# Patient Record
Sex: MALE | Race: WHITE | Employment: OTHER | URBAN - METROPOLITAN AREA
[De-identification: names, ages, dates, MRNs, and addresses within clinical notes are randomized per-mention and may not be internally consistent; named-entity substitution may affect disease eponyms.]

---

## 2017-01-04 ENCOUNTER — HOSPITAL ENCOUNTER (OUTPATIENT)
Dept: WOUND CARE | Age: 52
Discharge: HOME | End: 2017-01-04
Attending: INTERNAL MEDICINE | Admitting: INTERNAL MEDICINE

## 2017-01-04 VITALS
HEIGHT: 74 IN | BODY MASS INDEX: 33.5 KG/M2 | SYSTOLIC BLOOD PRESSURE: 130 MMHG | TEMPERATURE: 97.9 F | HEART RATE: 74 BPM | DIASTOLIC BLOOD PRESSURE: 74 MMHG | WEIGHT: 261 LBS | RESPIRATION RATE: 17 BRPM

## 2017-01-04 PROCEDURE — 97605 NEG PRS WND THER DME<=50SQCM: CPT | Performed by: INTERNAL MEDICINE

## 2017-01-04 PROCEDURE — 11042 DBRDMT SUBQ TIS 1ST 20SQCM/<: CPT | Performed by: INTERNAL MEDICINE

## 2017-01-04 PROCEDURE — 17250 CHEM CAUT OF GRANLTJ TISSUE: CPT | Performed by: INTERNAL MEDICINE

## 2017-01-04 ASSESSMENT — PAIN DESCRIPTION - PAIN TYPE: TYPE: CHRONIC PAIN

## 2017-01-04 ASSESSMENT — PAIN DESCRIPTION - LOCATION: LOCATION: BACK;SHOULDER;NECK

## 2017-01-04 ASSESSMENT — PAIN DESCRIPTION - ORIENTATION: ORIENTATION: RIGHT

## 2017-01-04 ASSESSMENT — PAIN DESCRIPTION - ONSET: ONSET: ON-GOING

## 2017-01-04 ASSESSMENT — PAIN DESCRIPTION - FREQUENCY: FREQUENCY: CONTINUOUS

## 2017-01-04 ASSESSMENT — PAIN DESCRIPTION - DESCRIPTORS: DESCRIPTORS: BURNING

## 2017-01-04 ASSESSMENT — PAIN DESCRIPTION - PROGRESSION: CLINICAL_PROGRESSION: NOT CHANGED

## 2017-01-04 ASSESSMENT — PAIN SCALES - GENERAL: PAINLEVEL_OUTOF10: 3

## 2017-01-11 ENCOUNTER — HOSPITAL ENCOUNTER (OUTPATIENT)
Dept: WOUND CARE | Age: 52
Discharge: HOME | End: 2017-01-11
Attending: INTERNAL MEDICINE | Admitting: INTERNAL MEDICINE

## 2017-01-11 VITALS
HEART RATE: 83 BPM | WEIGHT: 255.13 LBS | SYSTOLIC BLOOD PRESSURE: 110 MMHG | HEIGHT: 74 IN | RESPIRATION RATE: 18 BRPM | BODY MASS INDEX: 32.74 KG/M2 | TEMPERATURE: 97 F | DIASTOLIC BLOOD PRESSURE: 74 MMHG

## 2017-01-11 PROCEDURE — 11044 DBRDMT BONE 1ST 20 SQ CM/<: CPT | Performed by: INTERNAL MEDICINE

## 2017-01-11 PROCEDURE — 17250 CHEM CAUT OF GRANLTJ TISSUE: CPT | Performed by: INTERNAL MEDICINE

## 2017-01-11 ASSESSMENT — PAIN DESCRIPTION - LOCATION: LOCATION: SHOULDER

## 2017-01-11 ASSESSMENT — PAIN DESCRIPTION - DESCRIPTORS: DESCRIPTORS: BURNING;STABBING

## 2017-01-11 ASSESSMENT — PAIN DESCRIPTION - PROGRESSION: CLINICAL_PROGRESSION: NOT CHANGED

## 2017-01-11 ASSESSMENT — PAIN DESCRIPTION - FREQUENCY: FREQUENCY: CONTINUOUS

## 2017-01-11 ASSESSMENT — PAIN SCALES - GENERAL: PAINLEVEL_OUTOF10: 3

## 2017-01-11 ASSESSMENT — PAIN DESCRIPTION - ONSET: ONSET: ON-GOING

## 2017-01-11 ASSESSMENT — PAIN DESCRIPTION - ORIENTATION: ORIENTATION: LEFT

## 2017-01-11 ASSESSMENT — PAIN DESCRIPTION - PAIN TYPE: TYPE: CHRONIC PAIN

## 2017-01-12 DIAGNOSIS — E11.622 DIABETES MELLITUS WITH SKIN ULCER (HCC): ICD-10-CM

## 2017-01-12 DIAGNOSIS — L98.499 DIABETES MELLITUS WITH SKIN ULCER (HCC): ICD-10-CM

## 2017-01-12 RX ORDER — INSULIN DETEMIR 100 [IU]/ML
INJECTION, SOLUTION SUBCUTANEOUS
Qty: 1 VIAL | Refills: 0 | Status: SHIPPED | OUTPATIENT
Start: 2017-01-12 | End: 2017-01-19 | Stop reason: SDUPTHER

## 2017-01-13 DIAGNOSIS — E11.622 DIABETES MELLITUS WITH SKIN ULCER (HCC): ICD-10-CM

## 2017-01-13 DIAGNOSIS — L98.499 DIABETES MELLITUS WITH SKIN ULCER (HCC): ICD-10-CM

## 2017-01-18 ENCOUNTER — HOSPITAL ENCOUNTER (OUTPATIENT)
Dept: WOUND CARE | Age: 52
Discharge: HOME | End: 2017-01-18
Attending: INTERNAL MEDICINE | Admitting: INTERNAL MEDICINE

## 2017-01-18 VITALS
RESPIRATION RATE: 20 BRPM | TEMPERATURE: 97.2 F | WEIGHT: 255.4 LBS | DIASTOLIC BLOOD PRESSURE: 64 MMHG | SYSTOLIC BLOOD PRESSURE: 107 MMHG | HEART RATE: 65 BPM | BODY MASS INDEX: 32.79 KG/M2

## 2017-01-18 DIAGNOSIS — E11.621: Primary | ICD-10-CM

## 2017-01-18 DIAGNOSIS — L97.524: Primary | ICD-10-CM

## 2017-01-18 PROCEDURE — 11042 DBRDMT SUBQ TIS 1ST 20SQCM/<: CPT | Performed by: INTERNAL MEDICINE

## 2017-01-18 PROCEDURE — 11044 DBRDMT BONE 1ST 20 SQ CM/<: CPT | Performed by: INTERNAL MEDICINE

## 2017-01-18 PROCEDURE — 17250 CHEM CAUT OF GRANLTJ TISSUE: CPT | Performed by: INTERNAL MEDICINE

## 2017-01-19 ENCOUNTER — OFFICE VISIT (OUTPATIENT)
Dept: INTERNAL MEDICINE CLINIC | Age: 52
End: 2017-01-19

## 2017-01-19 VITALS
BODY MASS INDEX: 32.79 KG/M2 | DIASTOLIC BLOOD PRESSURE: 64 MMHG | HEIGHT: 74 IN | OXYGEN SATURATION: 99 % | SYSTOLIC BLOOD PRESSURE: 100 MMHG | HEART RATE: 67 BPM

## 2017-01-19 DIAGNOSIS — L97.509 TYPE 2 DIABETES MELLITUS WITH FOOT ULCER, WITH LONG-TERM CURRENT USE OF INSULIN (HCC): Primary | ICD-10-CM

## 2017-01-19 DIAGNOSIS — E11.621 TYPE 2 DIABETES MELLITUS WITH FOOT ULCER, WITH LONG-TERM CURRENT USE OF INSULIN (HCC): Primary | ICD-10-CM

## 2017-01-19 DIAGNOSIS — K21.9 GASTROESOPHAGEAL REFLUX DISEASE WITHOUT ESOPHAGITIS: ICD-10-CM

## 2017-01-19 DIAGNOSIS — M86.9 DIABETIC FOOT ULCER WITH OSTEOMYELITIS (HCC): ICD-10-CM

## 2017-01-19 DIAGNOSIS — G82.20 PARAPLEGIA (HCC): ICD-10-CM

## 2017-01-19 DIAGNOSIS — R11.0 NAUSEA: ICD-10-CM

## 2017-01-19 DIAGNOSIS — D63.8 ANEMIA OF CHRONIC DISORDER: ICD-10-CM

## 2017-01-19 DIAGNOSIS — Z79.4 TYPE 2 DIABETES MELLITUS WITH FOOT ULCER, WITH LONG-TERM CURRENT USE OF INSULIN (HCC): Primary | ICD-10-CM

## 2017-01-19 DIAGNOSIS — E11.621 DIABETIC FOOT ULCER WITH OSTEOMYELITIS (HCC): ICD-10-CM

## 2017-01-19 DIAGNOSIS — G89.29 CHRONIC BACK PAIN, UNSPECIFIED BACK LOCATION, UNSPECIFIED BACK PAIN LATERALITY: ICD-10-CM

## 2017-01-19 DIAGNOSIS — E78.5 HYPERLIPIDEMIA, UNSPECIFIED HYPERLIPIDEMIA TYPE: ICD-10-CM

## 2017-01-19 DIAGNOSIS — L97.509 DIABETIC FOOT ULCER WITH OSTEOMYELITIS (HCC): ICD-10-CM

## 2017-01-19 DIAGNOSIS — G47.09 OTHER INSOMNIA: ICD-10-CM

## 2017-01-19 DIAGNOSIS — R60.0 PEDAL EDEMA: ICD-10-CM

## 2017-01-19 DIAGNOSIS — M62.838 MUSCLE SPASM: ICD-10-CM

## 2017-01-19 DIAGNOSIS — K59.01 CONSTIPATION BY DELAYED COLONIC TRANSIT: ICD-10-CM

## 2017-01-19 DIAGNOSIS — E11.69 DIABETIC FOOT ULCER WITH OSTEOMYELITIS (HCC): ICD-10-CM

## 2017-01-19 DIAGNOSIS — G47.00 INSOMNIA, UNSPECIFIED TYPE: ICD-10-CM

## 2017-01-19 DIAGNOSIS — M54.9 CHRONIC BACK PAIN, UNSPECIFIED BACK LOCATION, UNSPECIFIED BACK PAIN LATERALITY: ICD-10-CM

## 2017-01-19 DIAGNOSIS — L98.491 CHRONIC SKIN ULCER, LIMITED TO BREAKDOWN OF SKIN (HCC): ICD-10-CM

## 2017-01-19 LAB
BASOPHILS ABSOLUTE: 0.1 K/UL (ref 0–0.2)
BASOPHILS RELATIVE PERCENT: 0.9 %
EOSINOPHILS ABSOLUTE: 0.2 K/UL (ref 0–0.6)
EOSINOPHILS RELATIVE PERCENT: 2.4 %
HCT VFR BLD CALC: 40.5 % (ref 40.5–52.5)
HEMOGLOBIN: 13.2 G/DL (ref 13.5–17.5)
LYMPHOCYTES ABSOLUTE: 1.8 K/UL (ref 1–5.1)
LYMPHOCYTES RELATIVE PERCENT: 21.4 %
MCH RBC QN AUTO: 26.2 PG (ref 26–34)
MCHC RBC AUTO-ENTMCNC: 32.6 G/DL (ref 31–36)
MCV RBC AUTO: 80.3 FL (ref 80–100)
MONOCYTES ABSOLUTE: 0.5 K/UL (ref 0–1.3)
MONOCYTES RELATIVE PERCENT: 6 %
NEUTROPHILS ABSOLUTE: 5.8 K/UL (ref 1.7–7.7)
NEUTROPHILS RELATIVE PERCENT: 69.3 %
PDW BLD-RTO: 17.5 % (ref 12.4–15.4)
PLATELET # BLD: 254 K/UL (ref 135–450)
PMV BLD AUTO: 9.8 FL (ref 5–10.5)
RBC # BLD: 5.04 M/UL (ref 4.2–5.9)
WBC # BLD: 8.3 K/UL (ref 4–11)

## 2017-01-19 PROCEDURE — 36415 COLL VENOUS BLD VENIPUNCTURE: CPT | Performed by: INTERNAL MEDICINE

## 2017-01-19 PROCEDURE — 83036 HEMOGLOBIN GLYCOSYLATED A1C: CPT | Performed by: INTERNAL MEDICINE

## 2017-01-19 PROCEDURE — 99214 OFFICE O/P EST MOD 30 MIN: CPT | Performed by: INTERNAL MEDICINE

## 2017-01-19 RX ORDER — FUROSEMIDE 20 MG/1
20 TABLET ORAL DAILY
Qty: 90 TABLET | Refills: 1 | Status: ON HOLD | OUTPATIENT
Start: 2017-01-19 | End: 2017-10-05 | Stop reason: HOSPADM

## 2017-01-19 RX ORDER — CYCLOBENZAPRINE HCL 10 MG
10 TABLET ORAL 2 TIMES DAILY PRN
Qty: 180 TABLET | Refills: 1 | Status: SHIPPED | OUTPATIENT
Start: 2017-01-19 | End: 2022-01-01 | Stop reason: ALTCHOICE

## 2017-01-19 RX ORDER — FERROUS SULFATE 325(65) MG
325 TABLET ORAL
Qty: 270 TABLET | Refills: 1 | Status: SHIPPED | OUTPATIENT
Start: 2017-01-19 | End: 2017-09-05 | Stop reason: SDUPTHER

## 2017-01-19 RX ORDER — DOCUSATE SODIUM 100 MG/1
CAPSULE, LIQUID FILLED ORAL
Qty: 180 CAPSULE | Refills: 1 | Status: SHIPPED | OUTPATIENT
Start: 2017-01-19 | End: 2017-12-31 | Stop reason: SDUPTHER

## 2017-01-19 RX ORDER — TRAZODONE HYDROCHLORIDE 50 MG/1
25 TABLET ORAL NIGHTLY
Qty: 90 TABLET | Refills: 1 | Status: SHIPPED | OUTPATIENT
Start: 2017-01-19 | End: 2018-02-08 | Stop reason: SDUPTHER

## 2017-01-19 RX ORDER — PANTOPRAZOLE SODIUM 40 MG/1
TABLET, DELAYED RELEASE ORAL
Qty: 90 TABLET | Refills: 1 | Status: SHIPPED | OUTPATIENT
Start: 2017-01-19 | End: 2017-11-21 | Stop reason: SDUPTHER

## 2017-01-19 RX ORDER — OXYCODONE HYDROCHLORIDE 5 MG/1
TABLET ORAL
Qty: 15 TABLET | Refills: 0 | Status: SHIPPED | OUTPATIENT
Start: 2017-01-19 | End: 2020-07-21

## 2017-01-19 RX ORDER — PROMETHAZINE HYDROCHLORIDE 25 MG/1
25 TABLET ORAL EVERY 6 HOURS PRN
Qty: 15 TABLET | Refills: 3 | Status: ON HOLD | OUTPATIENT
Start: 2017-01-19 | End: 2017-10-05 | Stop reason: HOSPADM

## 2017-01-20 LAB
A/G RATIO: 1 (ref 1.1–2.2)
ALBUMIN SERPL-MCNC: 3.8 G/DL (ref 3.4–5)
ALP BLD-CCNC: 129 U/L (ref 40–129)
ALT SERPL-CCNC: 13 U/L (ref 10–40)
ANION GAP SERPL CALCULATED.3IONS-SCNC: 17 MMOL/L (ref 3–16)
AST SERPL-CCNC: 15 U/L (ref 15–37)
BILIRUB SERPL-MCNC: 0.3 MG/DL (ref 0–1)
BUN BLDV-MCNC: 32 MG/DL (ref 7–20)
CALCIUM SERPL-MCNC: 9.4 MG/DL (ref 8.3–10.6)
CHLORIDE BLD-SCNC: 96 MMOL/L (ref 99–110)
CHOLESTEROL, TOTAL: 238 MG/DL (ref 0–199)
CO2: 25 MMOL/L (ref 21–32)
CREAT SERPL-MCNC: 0.7 MG/DL (ref 0.9–1.3)
GFR AFRICAN AMERICAN: >60
GFR NON-AFRICAN AMERICAN: >60
GLOBULIN: 3.8 G/DL
GLUCOSE BLD-MCNC: 132 MG/DL (ref 70–99)
HDLC SERPL-MCNC: 28 MG/DL (ref 40–60)
LDL CHOLESTEROL CALCULATED: ABNORMAL MG/DL
LDL CHOLESTEROL DIRECT: 171 MG/DL
POTASSIUM SERPL-SCNC: 4 MMOL/L (ref 3.5–5.1)
SODIUM BLD-SCNC: 138 MMOL/L (ref 136–145)
TOTAL PROTEIN: 7.6 G/DL (ref 6.4–8.2)
TRIGL SERPL-MCNC: 361 MG/DL (ref 0–150)
VLDLC SERPL CALC-MCNC: ABNORMAL MG/DL

## 2017-01-22 ASSESSMENT — ENCOUNTER SYMPTOMS
COUGH: 0
ROS SKIN COMMENTS: SKIN ULCERS
BACK PAIN: 1

## 2017-01-23 ENCOUNTER — HOSPITAL ENCOUNTER (OUTPATIENT)
Dept: OTHER | Age: 52
Discharge: HOME | End: 2017-01-23
Attending: INTERNAL MEDICINE | Admitting: INTERNAL MEDICINE

## 2017-01-23 ENCOUNTER — HOSPITAL ENCOUNTER (OUTPATIENT)
Dept: WOUND CARE | Age: 52
Discharge: HOME | End: 2017-01-23
Attending: PODIATRIST | Admitting: PODIATRIST

## 2017-01-23 VITALS
HEIGHT: 74 IN | RESPIRATION RATE: 20 BRPM | WEIGHT: 255 LBS | TEMPERATURE: 98.3 F | BODY MASS INDEX: 32.73 KG/M2 | HEART RATE: 72 BPM | DIASTOLIC BLOOD PRESSURE: 66 MMHG | SYSTOLIC BLOOD PRESSURE: 121 MMHG

## 2017-01-23 DIAGNOSIS — M86.672 CHRONIC OSTEOMYELITIS OF LEFT FOOT (HCC): ICD-10-CM

## 2017-01-23 DIAGNOSIS — L89.322 DECUBITUS ULCER OF LEFT BUTTOCK, STAGE 2 (HCC): ICD-10-CM

## 2017-01-23 DIAGNOSIS — L97.524: ICD-10-CM

## 2017-01-23 DIAGNOSIS — E11.621: ICD-10-CM

## 2017-01-23 PROCEDURE — 99214 OFFICE O/P EST MOD 30 MIN: CPT | Performed by: INTERNAL MEDICINE

## 2017-01-23 ASSESSMENT — PAIN DESCRIPTION - DESCRIPTORS: DESCRIPTORS: CONSTANT;BURNING;STABBING

## 2017-01-23 ASSESSMENT — PAIN DESCRIPTION - FREQUENCY: FREQUENCY: CONTINUOUS

## 2017-01-23 ASSESSMENT — PAIN DESCRIPTION - ORIENTATION: ORIENTATION: LEFT

## 2017-01-23 ASSESSMENT — PAIN SCALES - GENERAL: PAINLEVEL_OUTOF10: 3

## 2017-01-23 ASSESSMENT — PAIN DESCRIPTION - LOCATION: LOCATION: SHOULDER;NECK

## 2017-01-23 ASSESSMENT — PAIN DESCRIPTION - ONSET: ONSET: ON-GOING

## 2017-01-23 ASSESSMENT — PAIN DESCRIPTION - PAIN TYPE: TYPE: CHRONIC PAIN

## 2017-01-25 PROBLEM — L89.322 DECUBITUS ULCER OF LEFT BUTTOCK, STAGE 2 (HCC): Status: ACTIVE | Noted: 2017-01-25

## 2017-01-25 PROBLEM — M86.672 CHRONIC OSTEOMYELITIS OF LEFT FOOT (HCC): Status: ACTIVE | Noted: 2017-01-25

## 2017-02-01 ENCOUNTER — HOSPITAL ENCOUNTER (OUTPATIENT)
Dept: WOUND CARE | Age: 52
Discharge: HOME | End: 2017-02-01
Attending: INTERNAL MEDICINE | Admitting: INTERNAL MEDICINE

## 2017-02-01 VITALS
TEMPERATURE: 97.1 F | SYSTOLIC BLOOD PRESSURE: 128 MMHG | HEART RATE: 68 BPM | RESPIRATION RATE: 18 BRPM | DIASTOLIC BLOOD PRESSURE: 74 MMHG

## 2017-02-01 DIAGNOSIS — M86.172 ACUTE OSTEOMYELITIS OF LEFT FOOT (HCC): ICD-10-CM

## 2017-02-01 DIAGNOSIS — M86.672 CHRONIC OSTEOMYELITIS OF LEFT FOOT (HCC): ICD-10-CM

## 2017-02-01 LAB
APTT: 35 SEC (ref 25.2–36.4)
INR BLD: 1.09 (ref 0.85–1.16)
PROTHROMBIN TIME: 12.4 SEC (ref 9.8–13)

## 2017-02-01 PROCEDURE — 97597 DBRDMT OPN WND 1ST 20 CM/<: CPT | Performed by: INTERNAL MEDICINE

## 2017-02-01 ASSESSMENT — PAIN DESCRIPTION - ONSET: ONSET: ON-GOING

## 2017-02-01 ASSESSMENT — PAIN DESCRIPTION - PAIN TYPE: TYPE: CHRONIC PAIN

## 2017-02-01 ASSESSMENT — PAIN DESCRIPTION - LOCATION: LOCATION: NECK;SHOULDER

## 2017-02-01 ASSESSMENT — PAIN DESCRIPTION - FREQUENCY: FREQUENCY: CONTINUOUS

## 2017-02-01 ASSESSMENT — PAIN DESCRIPTION - ORIENTATION: ORIENTATION: LEFT

## 2017-02-01 ASSESSMENT — PAIN SCALES - GENERAL: PAINLEVEL_OUTOF10: 3

## 2017-02-01 ASSESSMENT — PAIN DESCRIPTION - DESCRIPTORS: DESCRIPTORS: BURNING;STABBING;CONSTANT

## 2017-02-01 ASSESSMENT — PAIN DESCRIPTION - PROGRESSION: CLINICAL_PROGRESSION: NOT CHANGED

## 2017-02-02 ENCOUNTER — HOSPITAL ENCOUNTER (OUTPATIENT)
Dept: SURGERY | Age: 52
Discharge: HOME | End: 2017-02-02
Attending: PODIATRIST | Admitting: PODIATRIST

## 2017-02-02 VITALS
RESPIRATION RATE: 18 BRPM | SYSTOLIC BLOOD PRESSURE: 138 MMHG | HEIGHT: 74 IN | HEART RATE: 74 BPM | WEIGHT: 255 LBS | DIASTOLIC BLOOD PRESSURE: 70 MMHG | OXYGEN SATURATION: 94 % | BODY MASS INDEX: 32.73 KG/M2 | TEMPERATURE: 97.9 F

## 2017-02-02 RX ORDER — SODIUM CHLORIDE 0.9 % (FLUSH) 0.9 %
10 SYRINGE (ML) INJECTION PRN
Status: DISCONTINUED | OUTPATIENT
Start: 2017-02-02 | End: 2017-02-03 | Stop reason: HOSPADM

## 2017-02-02 RX ORDER — SODIUM CHLORIDE 0.9 % (FLUSH) 0.9 %
10 SYRINGE (ML) INJECTION EVERY 12 HOURS SCHEDULED
Status: DISCONTINUED | OUTPATIENT
Start: 2017-02-02 | End: 2017-02-03 | Stop reason: HOSPADM

## 2017-02-02 RX ORDER — SODIUM CHLORIDE, SODIUM LACTATE, POTASSIUM CHLORIDE, CALCIUM CHLORIDE 600; 310; 30; 20 MG/100ML; MG/100ML; MG/100ML; MG/100ML
INJECTION, SOLUTION INTRAVENOUS CONTINUOUS
Status: DISCONTINUED | OUTPATIENT
Start: 2017-02-02 | End: 2017-02-03 | Stop reason: HOSPADM

## 2017-02-02 RX ORDER — OXYCODONE HYDROCHLORIDE AND ACETAMINOPHEN 5; 325 MG/1; MG/1
2 TABLET ORAL PRN
Status: ACTIVE | OUTPATIENT
Start: 2017-02-02 | End: 2017-02-02

## 2017-02-02 RX ORDER — LABETALOL HYDROCHLORIDE 5 MG/ML
5 INJECTION, SOLUTION INTRAVENOUS EVERY 10 MIN PRN
Status: DISCONTINUED | OUTPATIENT
Start: 2017-02-02 | End: 2017-02-03 | Stop reason: HOSPADM

## 2017-02-02 RX ORDER — HYDRALAZINE HYDROCHLORIDE 20 MG/ML
5 INJECTION INTRAMUSCULAR; INTRAVENOUS
Status: DISCONTINUED | OUTPATIENT
Start: 2017-02-02 | End: 2017-02-03 | Stop reason: HOSPADM

## 2017-02-02 RX ORDER — HYDROMORPHONE HCL 110MG/55ML
0.25 PATIENT CONTROLLED ANALGESIA SYRINGE INTRAVENOUS EVERY 5 MIN PRN
Status: DISCONTINUED | OUTPATIENT
Start: 2017-02-02 | End: 2017-02-03 | Stop reason: HOSPADM

## 2017-02-02 RX ORDER — HYDROMORPHONE HCL 110MG/55ML
0.5 PATIENT CONTROLLED ANALGESIA SYRINGE INTRAVENOUS EVERY 5 MIN PRN
Status: DISCONTINUED | OUTPATIENT
Start: 2017-02-02 | End: 2017-02-03 | Stop reason: HOSPADM

## 2017-02-02 RX ORDER — MEPERIDINE HYDROCHLORIDE 25 MG/ML
12.5 INJECTION INTRAMUSCULAR; INTRAVENOUS; SUBCUTANEOUS EVERY 5 MIN PRN
Status: DISCONTINUED | OUTPATIENT
Start: 2017-02-02 | End: 2017-02-03 | Stop reason: HOSPADM

## 2017-02-02 RX ORDER — OXYCODONE HYDROCHLORIDE AND ACETAMINOPHEN 5; 325 MG/1; MG/1
1 TABLET ORAL PRN
Status: ACTIVE | OUTPATIENT
Start: 2017-02-02 | End: 2017-02-02

## 2017-02-02 RX ORDER — ONDANSETRON 2 MG/ML
4 INJECTION INTRAMUSCULAR; INTRAVENOUS
Status: ACTIVE | OUTPATIENT
Start: 2017-02-02 | End: 2017-02-02

## 2017-02-02 RX ORDER — LIDOCAINE HYDROCHLORIDE 10 MG/ML
1 INJECTION, SOLUTION EPIDURAL; INFILTRATION; INTRACAUDAL; PERINEURAL
Status: ACTIVE | OUTPATIENT
Start: 2017-02-02 | End: 2017-02-02

## 2017-02-02 ASSESSMENT — PAIN SCALES - GENERAL
PAINLEVEL_OUTOF10: 0
PAINLEVEL_OUTOF10: 0

## 2017-02-02 ASSESSMENT — PAIN - FUNCTIONAL ASSESSMENT: PAIN_FUNCTIONAL_ASSESSMENT: 0-10

## 2017-02-04 PROBLEM — M86.172 ACUTE OSTEOMYELITIS OF LEFT FOOT (HCC): Status: ACTIVE | Noted: 2017-01-25

## 2017-02-06 ENCOUNTER — HOSPITAL ENCOUNTER (OUTPATIENT)
Dept: WOUND CARE | Age: 52
Discharge: HOME | End: 2017-02-06
Attending: PODIATRIST | Admitting: PODIATRIST

## 2017-02-06 VITALS
TEMPERATURE: 97.3 F | RESPIRATION RATE: 18 BRPM | WEIGHT: 255 LBS | DIASTOLIC BLOOD PRESSURE: 70 MMHG | BODY MASS INDEX: 32.73 KG/M2 | HEART RATE: 70 BPM | SYSTOLIC BLOOD PRESSURE: 126 MMHG | HEIGHT: 74 IN

## 2017-02-06 DIAGNOSIS — A49.8 INFECTION DUE TO ESBL-PRODUCING KLEBSIELLA PNEUMONIAE: ICD-10-CM

## 2017-02-06 DIAGNOSIS — Z16.12 INFECTION DUE TO ESBL-PRODUCING KLEBSIELLA PNEUMONIAE: ICD-10-CM

## 2017-02-06 DIAGNOSIS — A49.8 INFECTION, PSEUDOMONAS: ICD-10-CM

## 2017-02-06 DIAGNOSIS — A49.1 INFECTION DUE TO ENTEROCOCCUS: ICD-10-CM

## 2017-02-06 DIAGNOSIS — A49.02 INFECTION WITH METHICILLIN-RESISTANT STAPHYLOCOCCUS AUREUS (MRSA): ICD-10-CM

## 2017-02-06 PROCEDURE — 99215 OFFICE O/P EST HI 40 MIN: CPT | Performed by: INTERNAL MEDICINE

## 2017-02-06 ASSESSMENT — PAIN DESCRIPTION - PROGRESSION: CLINICAL_PROGRESSION: NOT CHANGED

## 2017-02-06 ASSESSMENT — PAIN SCALES - GENERAL: PAINLEVEL_OUTOF10: 3

## 2017-02-06 ASSESSMENT — PAIN DESCRIPTION - DESCRIPTORS: DESCRIPTORS: THROBBING;CONSTANT;STABBING

## 2017-02-06 ASSESSMENT — PAIN DESCRIPTION - PAIN TYPE: TYPE: CHRONIC PAIN

## 2017-02-06 ASSESSMENT — PAIN DESCRIPTION - FREQUENCY: FREQUENCY: CONTINUOUS

## 2017-02-06 ASSESSMENT — PAIN DESCRIPTION - ONSET: ONSET: ON-GOING

## 2017-02-06 ASSESSMENT — PAIN DESCRIPTION - LOCATION: LOCATION: NECK;SHOULDER

## 2017-02-06 ASSESSMENT — PAIN DESCRIPTION - ORIENTATION: ORIENTATION: LEFT

## 2017-02-07 DIAGNOSIS — Z79.4 TYPE 2 DIABETES MELLITUS WITH FOOT ULCER, WITH LONG-TERM CURRENT USE OF INSULIN (HCC): ICD-10-CM

## 2017-02-07 DIAGNOSIS — E11.621 TYPE 2 DIABETES MELLITUS WITH FOOT ULCER, WITH LONG-TERM CURRENT USE OF INSULIN (HCC): ICD-10-CM

## 2017-02-07 DIAGNOSIS — L97.509 TYPE 2 DIABETES MELLITUS WITH FOOT ULCER, WITH LONG-TERM CURRENT USE OF INSULIN (HCC): ICD-10-CM

## 2017-02-07 PROBLEM — A49.8 INFECTION, PSEUDOMONAS: Status: ACTIVE | Noted: 2017-02-07

## 2017-02-07 PROBLEM — A49.02 INFECTION WITH METHICILLIN-RESISTANT STAPHYLOCOCCUS AUREUS (MRSA): Status: ACTIVE | Noted: 2017-02-07

## 2017-02-07 PROBLEM — A49.1 INFECTION DUE TO ENTEROCOCCUS: Status: ACTIVE | Noted: 2017-02-07

## 2017-02-07 LAB
ORGANISM: ABNORMAL
WOUND/ABSCESS: ABNORMAL

## 2017-02-13 ENCOUNTER — HOSPITAL ENCOUNTER (OUTPATIENT)
Dept: WOUND CARE | Age: 52
Discharge: HOME | End: 2017-02-13
Attending: PODIATRIST | Admitting: PODIATRIST

## 2017-02-13 VITALS
SYSTOLIC BLOOD PRESSURE: 114 MMHG | DIASTOLIC BLOOD PRESSURE: 72 MMHG | RESPIRATION RATE: 18 BRPM | WEIGHT: 255.7 LBS | TEMPERATURE: 96.7 F | HEART RATE: 65 BPM | HEIGHT: 74 IN | BODY MASS INDEX: 32.81 KG/M2

## 2017-02-13 DIAGNOSIS — L97.522 DIABETIC ULCER OF LEFT FOOT WITH FAT LAYER EXPOSED (HCC): ICD-10-CM

## 2017-02-13 DIAGNOSIS — T81.31XA SURGICAL WOUND DEHISCENCE, INITIAL ENCOUNTER: ICD-10-CM

## 2017-02-13 DIAGNOSIS — E11.621 DIABETIC ULCER OF LEFT FOOT WITH FAT LAYER EXPOSED (HCC): ICD-10-CM

## 2017-02-13 PROCEDURE — 99214 OFFICE O/P EST MOD 30 MIN: CPT | Performed by: INTERNAL MEDICINE

## 2017-02-13 ASSESSMENT — PAIN DESCRIPTION - ORIENTATION: ORIENTATION: LEFT;MID

## 2017-02-13 ASSESSMENT — PAIN DESCRIPTION - PAIN TYPE: TYPE: CHRONIC PAIN

## 2017-02-13 ASSESSMENT — PAIN SCALES - GENERAL: PAINLEVEL_OUTOF10: 3

## 2017-02-13 ASSESSMENT — PAIN DESCRIPTION - PROGRESSION: CLINICAL_PROGRESSION: NOT CHANGED

## 2017-02-13 ASSESSMENT — PAIN DESCRIPTION - ONSET: ONSET: ON-GOING

## 2017-02-13 ASSESSMENT — PAIN DESCRIPTION - FREQUENCY: FREQUENCY: CONTINUOUS

## 2017-02-13 ASSESSMENT — PAIN DESCRIPTION - DESCRIPTORS: DESCRIPTORS: THROBBING;CONSTANT;STABBING

## 2017-02-13 ASSESSMENT — PAIN DESCRIPTION - LOCATION: LOCATION: NECK;SHOULDER

## 2017-02-16 PROBLEM — L89.322 DECUBITUS ULCER OF LEFT BUTTOCK, STAGE 2 (HCC): Status: RESOLVED | Noted: 2017-01-25 | Resolved: 2017-02-16

## 2017-02-16 PROBLEM — T81.31XA SURGICAL WOUND DEHISCENCE: Status: ACTIVE | Noted: 2017-02-16

## 2017-02-21 ENCOUNTER — HOSPITAL ENCOUNTER (OUTPATIENT)
Dept: WOUND CARE | Age: 52
Discharge: HOME | End: 2017-02-21
Attending: INTERNAL MEDICINE | Admitting: INTERNAL MEDICINE

## 2017-02-21 VITALS
TEMPERATURE: 97.2 F | DIASTOLIC BLOOD PRESSURE: 70 MMHG | HEART RATE: 74 BPM | SYSTOLIC BLOOD PRESSURE: 106 MMHG | RESPIRATION RATE: 20 BRPM

## 2017-02-21 PROCEDURE — 17250 CHEM CAUT OF GRANLTJ TISSUE: CPT | Performed by: INTERNAL MEDICINE

## 2017-02-21 PROCEDURE — 11042 DBRDMT SUBQ TIS 1ST 20SQCM/<: CPT | Performed by: INTERNAL MEDICINE

## 2017-02-21 RX ORDER — MEROPENEM 1 G/1
1 INJECTION, POWDER, FOR SOLUTION INTRAVENOUS EVERY 8 HOURS
COMMUNITY
End: 2017-03-29 | Stop reason: ALTCHOICE

## 2017-02-21 ASSESSMENT — PAIN DESCRIPTION - ONSET: ONSET: ON-GOING

## 2017-02-21 ASSESSMENT — PAIN DESCRIPTION - ORIENTATION: ORIENTATION: LEFT;MID

## 2017-02-21 ASSESSMENT — PAIN DESCRIPTION - DESCRIPTORS: DESCRIPTORS: CONSTANT;THROBBING;STABBING

## 2017-02-21 ASSESSMENT — PAIN DESCRIPTION - PAIN TYPE: TYPE: CHRONIC PAIN

## 2017-02-21 ASSESSMENT — PAIN DESCRIPTION - PROGRESSION: CLINICAL_PROGRESSION: NOT CHANGED

## 2017-02-21 ASSESSMENT — PAIN DESCRIPTION - LOCATION: LOCATION: NECK;SHOULDER

## 2017-02-21 ASSESSMENT — PAIN DESCRIPTION - FREQUENCY: FREQUENCY: CONTINUOUS

## 2017-02-21 ASSESSMENT — PAIN SCALES - GENERAL: PAINLEVEL_OUTOF10: 3

## 2017-03-01 ENCOUNTER — HOSPITAL ENCOUNTER (OUTPATIENT)
Dept: WOUND CARE | Age: 52
Discharge: HOME | End: 2017-03-01
Attending: INTERNAL MEDICINE | Admitting: INTERNAL MEDICINE

## 2017-03-01 VITALS
DIASTOLIC BLOOD PRESSURE: 74 MMHG | RESPIRATION RATE: 21 BRPM | HEART RATE: 70 BPM | HEIGHT: 74 IN | TEMPERATURE: 97.8 F | SYSTOLIC BLOOD PRESSURE: 128 MMHG

## 2017-03-01 PROCEDURE — 11043 DBRDMT MUSC&/FSCA 1ST 20/<: CPT | Performed by: INTERNAL MEDICINE

## 2017-03-01 PROCEDURE — 11042 DBRDMT SUBQ TIS 1ST 20SQCM/<: CPT | Performed by: INTERNAL MEDICINE

## 2017-03-01 PROCEDURE — 17250 CHEM CAUT OF GRANLTJ TISSUE: CPT | Performed by: INTERNAL MEDICINE

## 2017-03-01 RX ORDER — FLUCONAZOLE 100 MG/1
100 TABLET ORAL DAILY
Qty: 7 TABLET | Refills: 2 | Status: SHIPPED | OUTPATIENT
Start: 2017-03-01 | End: 2017-03-08

## 2017-03-01 ASSESSMENT — PAIN DESCRIPTION - DESCRIPTORS: DESCRIPTORS: CONSTANT

## 2017-03-01 ASSESSMENT — PAIN DESCRIPTION - PAIN TYPE: TYPE: CHRONIC PAIN

## 2017-03-01 ASSESSMENT — PAIN SCALES - GENERAL: PAINLEVEL_OUTOF10: 3

## 2017-03-01 ASSESSMENT — PAIN DESCRIPTION - ORIENTATION: ORIENTATION: MID

## 2017-03-01 ASSESSMENT — PAIN DESCRIPTION - PROGRESSION: CLINICAL_PROGRESSION: NOT CHANGED

## 2017-03-01 ASSESSMENT — PAIN DESCRIPTION - LOCATION: LOCATION: NECK

## 2017-03-01 ASSESSMENT — PAIN DESCRIPTION - FREQUENCY: FREQUENCY: CONTINUOUS

## 2017-03-01 ASSESSMENT — PAIN DESCRIPTION - ONSET: ONSET: ON-GOING

## 2017-03-04 PROBLEM — T81.31XA SURGICAL WOUND DEHISCENCE: Status: RESOLVED | Noted: 2017-02-16 | Resolved: 2017-03-04

## 2017-03-04 PROBLEM — L97.523 DIABETIC ULCER OF LEFT FOOT WITH NECROSIS OF MUSCLE (HCC): Status: ACTIVE | Noted: 2017-02-13

## 2017-03-06 RX ORDER — NYSTATIN 100000 [USP'U]/G
POWDER TOPICAL
Qty: 30 G | Refills: 1 | Status: SHIPPED | OUTPATIENT
Start: 2017-03-06 | End: 2019-10-10

## 2017-03-08 ENCOUNTER — HOSPITAL ENCOUNTER (OUTPATIENT)
Dept: WOUND CARE | Age: 52
Discharge: HOME | End: 2017-03-08
Attending: INTERNAL MEDICINE | Admitting: INTERNAL MEDICINE

## 2017-03-08 VITALS
TEMPERATURE: 97.8 F | SYSTOLIC BLOOD PRESSURE: 117 MMHG | HEIGHT: 74 IN | DIASTOLIC BLOOD PRESSURE: 71 MMHG | WEIGHT: 261.3 LBS | BODY MASS INDEX: 33.53 KG/M2 | RESPIRATION RATE: 20 BRPM | HEART RATE: 69 BPM

## 2017-03-08 PROCEDURE — 17250 CHEM CAUT OF GRANLTJ TISSUE: CPT | Performed by: INTERNAL MEDICINE

## 2017-03-08 PROCEDURE — 11042 DBRDMT SUBQ TIS 1ST 20SQCM/<: CPT | Performed by: INTERNAL MEDICINE

## 2017-03-08 PROCEDURE — 11043 DBRDMT MUSC&/FSCA 1ST 20/<: CPT | Performed by: INTERNAL MEDICINE

## 2017-03-08 ASSESSMENT — PAIN DESCRIPTION - PROGRESSION: CLINICAL_PROGRESSION: NOT CHANGED

## 2017-03-08 ASSESSMENT — PAIN DESCRIPTION - ORIENTATION: ORIENTATION: MID;LOWER

## 2017-03-08 ASSESSMENT — PAIN DESCRIPTION - PAIN TYPE: TYPE: CHRONIC PAIN

## 2017-03-08 ASSESSMENT — PAIN DESCRIPTION - FREQUENCY: FREQUENCY: CONTINUOUS

## 2017-03-08 ASSESSMENT — PAIN DESCRIPTION - ONSET: ONSET: ON-GOING

## 2017-03-08 ASSESSMENT — PAIN DESCRIPTION - DESCRIPTORS: DESCRIPTORS: STABBING;THROBBING

## 2017-03-08 ASSESSMENT — PAIN SCALES - GENERAL: PAINLEVEL_OUTOF10: 4

## 2017-03-08 ASSESSMENT — PAIN DESCRIPTION - LOCATION: LOCATION: NECK

## 2017-03-13 LAB
A/G RATIO: 0.9 (ref 1.1–2.2)
ALBUMIN SERPL-MCNC: 3.5 G/DL (ref 3.4–5)
ALP BLD-CCNC: 113 U/L (ref 40–129)
ALT SERPL-CCNC: 17 U/L (ref 10–40)
ANION GAP SERPL CALCULATED.3IONS-SCNC: 13 MMOL/L (ref 3–16)
AST SERPL-CCNC: 16 U/L (ref 15–37)
BASOPHILS ABSOLUTE: 0.1 K/UL (ref 0–0.2)
BASOPHILS RELATIVE PERCENT: 1 %
BILIRUB SERPL-MCNC: 0.3 MG/DL (ref 0–1)
BUN BLDV-MCNC: 27 MG/DL (ref 7–20)
C-REACTIVE PROTEIN: 26.3 MG/L (ref 0–5.1)
CALCIUM SERPL-MCNC: 9.1 MG/DL (ref 8.3–10.6)
CHLORIDE BLD-SCNC: 93 MMOL/L (ref 99–110)
CO2: 26 MMOL/L (ref 21–32)
CREAT SERPL-MCNC: 0.6 MG/DL (ref 0.9–1.3)
EOSINOPHILS ABSOLUTE: 0.5 K/UL (ref 0–0.6)
EOSINOPHILS RELATIVE PERCENT: 6.2 %
GFR AFRICAN AMERICAN: >60
GFR NON-AFRICAN AMERICAN: >60
GLOBULIN: 3.7 G/DL
GLUCOSE BLD-MCNC: 156 MG/DL (ref 70–99)
HCT VFR BLD CALC: 35.4 % (ref 40.5–52.5)
HEMOGLOBIN: 11.8 G/DL (ref 13.5–17.5)
LYMPHOCYTES ABSOLUTE: 2.1 K/UL (ref 1–5.1)
LYMPHOCYTES RELATIVE PERCENT: 28.1 %
MCH RBC QN AUTO: 26.7 PG (ref 26–34)
MCHC RBC AUTO-ENTMCNC: 33.2 G/DL (ref 31–36)
MCV RBC AUTO: 80.4 FL (ref 80–100)
MONOCYTES ABSOLUTE: 0.6 K/UL (ref 0–1.3)
MONOCYTES RELATIVE PERCENT: 8.4 %
NEUTROPHILS ABSOLUTE: 4.3 K/UL (ref 1.7–7.7)
NEUTROPHILS RELATIVE PERCENT: 56.3 %
PDW BLD-RTO: 17.1 % (ref 12.4–15.4)
PLATELET # BLD: 244 K/UL (ref 135–450)
PMV BLD AUTO: 10.7 FL (ref 5–10.5)
POTASSIUM SERPL-SCNC: 4.4 MMOL/L (ref 3.5–5.1)
RBC # BLD: 4.41 M/UL (ref 4.2–5.9)
SEDIMENTATION RATE, ERYTHROCYTE: 67 MM/HR (ref 0–15)
SODIUM BLD-SCNC: 132 MMOL/L (ref 136–145)
TOTAL PROTEIN: 7.2 G/DL (ref 6.4–8.2)
VANCOMYCIN TROUGH: 23.1 UG/ML (ref 10–20)
WBC # BLD: 7.6 K/UL (ref 4–11)

## 2017-03-15 ENCOUNTER — HOSPITAL ENCOUNTER (OUTPATIENT)
Dept: WOUND CARE | Age: 52
Discharge: HOME | End: 2017-03-15
Attending: INTERNAL MEDICINE | Admitting: INTERNAL MEDICINE

## 2017-03-15 VITALS
SYSTOLIC BLOOD PRESSURE: 131 MMHG | HEIGHT: 74 IN | TEMPERATURE: 96.4 F | HEART RATE: 72 BPM | RESPIRATION RATE: 20 BRPM | WEIGHT: 261.7 LBS | DIASTOLIC BLOOD PRESSURE: 80 MMHG | BODY MASS INDEX: 33.59 KG/M2

## 2017-03-15 LAB
GLUCOSE BLD-MCNC: 50 MG/DL (ref 70–99)
GLUCOSE BLD-MCNC: 77 MG/DL (ref 70–99)
PERFORMED ON: ABNORMAL
PERFORMED ON: NORMAL

## 2017-03-15 PROCEDURE — 11043 DBRDMT MUSC&/FSCA 1ST 20/<: CPT | Performed by: INTERNAL MEDICINE

## 2017-03-15 PROCEDURE — 11042 DBRDMT SUBQ TIS 1ST 20SQCM/<: CPT | Performed by: INTERNAL MEDICINE

## 2017-03-15 PROCEDURE — 17250 CHEM CAUT OF GRANLTJ TISSUE: CPT | Performed by: INTERNAL MEDICINE

## 2017-03-15 ASSESSMENT — PAIN DESCRIPTION - ONSET: ONSET: ON-GOING

## 2017-03-15 ASSESSMENT — PAIN DESCRIPTION - FREQUENCY: FREQUENCY: CONTINUOUS

## 2017-03-15 ASSESSMENT — PAIN DESCRIPTION - ORIENTATION: ORIENTATION: LEFT;MID

## 2017-03-15 ASSESSMENT — PAIN DESCRIPTION - LOCATION: LOCATION: NECK;SHOULDER

## 2017-03-15 ASSESSMENT — PAIN SCALES - GENERAL: PAINLEVEL_OUTOF10: 3

## 2017-03-15 ASSESSMENT — PAIN DESCRIPTION - PROGRESSION: CLINICAL_PROGRESSION: NOT CHANGED

## 2017-03-15 ASSESSMENT — PAIN DESCRIPTION - DESCRIPTORS: DESCRIPTORS: ACHING;CONSTANT

## 2017-03-15 ASSESSMENT — PAIN DESCRIPTION - PAIN TYPE: TYPE: CHRONIC PAIN

## 2017-03-22 ENCOUNTER — HOSPITAL ENCOUNTER (OUTPATIENT)
Dept: WOUND CARE | Age: 52
Discharge: HOME | End: 2017-03-22
Attending: INTERNAL MEDICINE | Admitting: INTERNAL MEDICINE

## 2017-03-22 VITALS
HEIGHT: 74 IN | RESPIRATION RATE: 18 BRPM | HEART RATE: 68 BPM | SYSTOLIC BLOOD PRESSURE: 124 MMHG | DIASTOLIC BLOOD PRESSURE: 72 MMHG | TEMPERATURE: 96.9 F

## 2017-03-22 PROCEDURE — 15275 SKIN SUB GRAFT FACE/NK/HF/G: CPT | Performed by: INTERNAL MEDICINE

## 2017-03-22 PROCEDURE — 17250 CHEM CAUT OF GRANLTJ TISSUE: CPT | Performed by: INTERNAL MEDICINE

## 2017-03-22 ASSESSMENT — PAIN DESCRIPTION - DESCRIPTORS: DESCRIPTORS: ACHING;BURNING;CONSTANT

## 2017-03-22 ASSESSMENT — PAIN SCALES - GENERAL: PAINLEVEL_OUTOF10: 3

## 2017-03-22 ASSESSMENT — PAIN DESCRIPTION - ONSET: ONSET: ON-GOING

## 2017-03-22 ASSESSMENT — PAIN DESCRIPTION - PAIN TYPE: TYPE: CHRONIC PAIN

## 2017-03-22 ASSESSMENT — PAIN DESCRIPTION - PROGRESSION: CLINICAL_PROGRESSION: NOT CHANGED

## 2017-03-22 ASSESSMENT — PAIN DESCRIPTION - FREQUENCY: FREQUENCY: CONTINUOUS

## 2017-03-22 ASSESSMENT — PAIN DESCRIPTION - LOCATION: LOCATION: NECK;SHOULDER

## 2017-03-28 PROBLEM — A49.8 INFECTION, PSEUDOMONAS: Status: RESOLVED | Noted: 2017-02-07 | Resolved: 2017-03-28

## 2017-03-28 PROBLEM — M86.172 ACUTE OSTEOMYELITIS OF LEFT FOOT (HCC): Status: RESOLVED | Noted: 2017-01-25 | Resolved: 2017-03-28

## 2017-03-28 PROBLEM — A49.8 INFECTION DUE TO ESBL-PRODUCING KLEBSIELLA PNEUMONIAE: Status: RESOLVED | Noted: 2017-02-06 | Resolved: 2017-03-28

## 2017-03-28 PROBLEM — Z16.12 INFECTION DUE TO ESBL-PRODUCING KLEBSIELLA PNEUMONIAE: Status: RESOLVED | Noted: 2017-02-06 | Resolved: 2017-03-28

## 2017-03-28 PROBLEM — A49.02 INFECTION WITH METHICILLIN-RESISTANT STAPHYLOCOCCUS AUREUS (MRSA): Status: RESOLVED | Noted: 2017-02-07 | Resolved: 2017-03-28

## 2017-03-28 PROBLEM — A49.1 INFECTION DUE TO ENTEROCOCCUS: Status: RESOLVED | Noted: 2017-02-07 | Resolved: 2017-03-28

## 2017-03-29 ENCOUNTER — HOSPITAL ENCOUNTER (OUTPATIENT)
Dept: WOUND CARE | Age: 52
Discharge: HOME | End: 2017-03-29
Attending: INTERNAL MEDICINE | Admitting: INTERNAL MEDICINE

## 2017-03-29 VITALS
DIASTOLIC BLOOD PRESSURE: 70 MMHG | RESPIRATION RATE: 18 BRPM | BODY MASS INDEX: 34.54 KG/M2 | HEART RATE: 76 BPM | TEMPERATURE: 96.7 F | SYSTOLIC BLOOD PRESSURE: 136 MMHG | WEIGHT: 269 LBS

## 2017-03-29 PROCEDURE — 17250 CHEM CAUT OF GRANLTJ TISSUE: CPT | Performed by: INTERNAL MEDICINE

## 2017-03-29 PROCEDURE — 97605 NEG PRS WND THER DME<=50SQCM: CPT | Performed by: INTERNAL MEDICINE

## 2017-03-29 PROCEDURE — 11042 DBRDMT SUBQ TIS 1ST 20SQCM/<: CPT | Performed by: INTERNAL MEDICINE

## 2017-03-29 PROCEDURE — 97597 DBRDMT OPN WND 1ST 20 CM/<: CPT | Performed by: INTERNAL MEDICINE

## 2017-03-29 ASSESSMENT — PAIN DESCRIPTION - FREQUENCY: FREQUENCY: CONTINUOUS

## 2017-03-29 ASSESSMENT — PAIN DESCRIPTION - PROGRESSION: CLINICAL_PROGRESSION: NOT CHANGED

## 2017-03-29 ASSESSMENT — PAIN SCALES - GENERAL: PAINLEVEL_OUTOF10: 3

## 2017-03-29 ASSESSMENT — PAIN DESCRIPTION - DESCRIPTORS: DESCRIPTORS: ACHING;BURNING;CONSTANT

## 2017-03-29 ASSESSMENT — PAIN DESCRIPTION - PAIN TYPE: TYPE: CHRONIC PAIN

## 2017-03-29 ASSESSMENT — PAIN DESCRIPTION - LOCATION: LOCATION: NECK;SHOULDER

## 2017-03-29 ASSESSMENT — PAIN DESCRIPTION - ONSET: ONSET: ON-GOING

## 2017-03-29 ASSESSMENT — PAIN DESCRIPTION - ORIENTATION: ORIENTATION: LEFT;MID

## 2017-04-02 RX ORDER — CEFDINIR 300 MG/1
300 CAPSULE ORAL 2 TIMES DAILY
COMMUNITY
End: 2017-05-23 | Stop reason: SDUPTHER

## 2017-04-05 ENCOUNTER — HOSPITAL ENCOUNTER (OUTPATIENT)
Dept: WOUND CARE | Age: 52
Discharge: HOME | End: 2017-04-05
Attending: INTERNAL MEDICINE | Admitting: INTERNAL MEDICINE

## 2017-04-05 VITALS
HEIGHT: 74 IN | SYSTOLIC BLOOD PRESSURE: 108 MMHG | HEART RATE: 79 BPM | DIASTOLIC BLOOD PRESSURE: 67 MMHG | TEMPERATURE: 97.6 F | RESPIRATION RATE: 17 BRPM

## 2017-04-05 PROCEDURE — 97605 NEG PRS WND THER DME<=50SQCM: CPT | Performed by: INTERNAL MEDICINE

## 2017-04-05 PROCEDURE — 97597 DBRDMT OPN WND 1ST 20 CM/<: CPT | Performed by: INTERNAL MEDICINE

## 2017-04-05 PROCEDURE — 17250 CHEM CAUT OF GRANLTJ TISSUE: CPT | Performed by: INTERNAL MEDICINE

## 2017-04-05 ASSESSMENT — PAIN DESCRIPTION - LOCATION: LOCATION: NECK;SHOULDER

## 2017-04-05 ASSESSMENT — PAIN DESCRIPTION - DESCRIPTORS: DESCRIPTORS: ACHING;BURNING

## 2017-04-05 ASSESSMENT — PAIN SCALES - GENERAL: PAINLEVEL_OUTOF10: 3

## 2017-04-05 ASSESSMENT — PAIN DESCRIPTION - PROGRESSION: CLINICAL_PROGRESSION: NOT CHANGED

## 2017-04-05 ASSESSMENT — PAIN DESCRIPTION - FREQUENCY: FREQUENCY: CONTINUOUS

## 2017-04-05 ASSESSMENT — PAIN DESCRIPTION - ORIENTATION: ORIENTATION: LEFT

## 2017-04-05 ASSESSMENT — PAIN DESCRIPTION - ONSET: ONSET: ON-GOING

## 2017-04-05 ASSESSMENT — PAIN DESCRIPTION - PAIN TYPE: TYPE: CHRONIC PAIN

## 2017-04-12 ENCOUNTER — HOSPITAL ENCOUNTER (OUTPATIENT)
Dept: WOUND CARE | Age: 52
Discharge: HOME | End: 2017-04-12
Attending: INTERNAL MEDICINE | Admitting: INTERNAL MEDICINE

## 2017-04-12 VITALS
HEIGHT: 74 IN | WEIGHT: 260.9 LBS | BODY MASS INDEX: 33.48 KG/M2 | RESPIRATION RATE: 20 BRPM | SYSTOLIC BLOOD PRESSURE: 96 MMHG | HEART RATE: 63 BPM | TEMPERATURE: 96.1 F | DIASTOLIC BLOOD PRESSURE: 56 MMHG

## 2017-04-12 PROCEDURE — 17250 CHEM CAUT OF GRANLTJ TISSUE: CPT | Performed by: INTERNAL MEDICINE

## 2017-04-12 PROCEDURE — 97597 DBRDMT OPN WND 1ST 20 CM/<: CPT | Performed by: INTERNAL MEDICINE

## 2017-04-12 PROCEDURE — 97605 NEG PRS WND THER DME<=50SQCM: CPT | Performed by: INTERNAL MEDICINE

## 2017-04-12 ASSESSMENT — PAIN DESCRIPTION - FREQUENCY: FREQUENCY: CONTINUOUS

## 2017-04-12 ASSESSMENT — PAIN DESCRIPTION - ORIENTATION: ORIENTATION: LEFT

## 2017-04-12 ASSESSMENT — PAIN DESCRIPTION - LOCATION: LOCATION: NECK;SHOULDER

## 2017-04-12 ASSESSMENT — PAIN DESCRIPTION - PAIN TYPE: TYPE: CHRONIC PAIN

## 2017-04-12 ASSESSMENT — PAIN DESCRIPTION - ONSET: ONSET: ON-GOING

## 2017-04-12 ASSESSMENT — PAIN DESCRIPTION - DESCRIPTORS: DESCRIPTORS: ACHING;BURNING

## 2017-04-12 ASSESSMENT — PAIN SCALES - GENERAL: PAINLEVEL_OUTOF10: 3

## 2017-04-12 ASSESSMENT — PAIN DESCRIPTION - PROGRESSION: CLINICAL_PROGRESSION: NOT CHANGED

## 2017-04-19 ENCOUNTER — HOSPITAL ENCOUNTER (OUTPATIENT)
Dept: WOUND CARE | Age: 52
Discharge: HOME | End: 2017-04-19
Attending: INTERNAL MEDICINE | Admitting: INTERNAL MEDICINE

## 2017-04-19 VITALS
TEMPERATURE: 97.1 F | DIASTOLIC BLOOD PRESSURE: 68 MMHG | RESPIRATION RATE: 18 BRPM | BODY MASS INDEX: 33.88 KG/M2 | HEART RATE: 80 BPM | HEIGHT: 74 IN | SYSTOLIC BLOOD PRESSURE: 121 MMHG | WEIGHT: 264 LBS

## 2017-04-19 PROCEDURE — 97605 NEG PRS WND THER DME<=50SQCM: CPT | Performed by: INTERNAL MEDICINE

## 2017-04-19 PROCEDURE — 11042 DBRDMT SUBQ TIS 1ST 20SQCM/<: CPT | Performed by: INTERNAL MEDICINE

## 2017-04-19 PROCEDURE — 97597 DBRDMT OPN WND 1ST 20 CM/<: CPT | Performed by: INTERNAL MEDICINE

## 2017-04-19 ASSESSMENT — PAIN DESCRIPTION - FREQUENCY: FREQUENCY: CONTINUOUS

## 2017-04-19 ASSESSMENT — PAIN DESCRIPTION - PROGRESSION: CLINICAL_PROGRESSION: NOT CHANGED

## 2017-04-19 ASSESSMENT — PAIN DESCRIPTION - LOCATION: LOCATION: NECK;SHOULDER

## 2017-04-19 ASSESSMENT — PAIN DESCRIPTION - PAIN TYPE: TYPE: CHRONIC PAIN

## 2017-04-19 ASSESSMENT — PAIN DESCRIPTION - DESCRIPTORS: DESCRIPTORS: ACHING;BURNING;CONSTANT

## 2017-04-19 ASSESSMENT — PAIN DESCRIPTION - ONSET: ONSET: ON-GOING

## 2017-04-19 ASSESSMENT — PAIN DESCRIPTION - ORIENTATION: ORIENTATION: LEFT

## 2017-04-19 ASSESSMENT — PAIN SCALES - GENERAL: PAINLEVEL_OUTOF10: 3

## 2017-04-26 ENCOUNTER — HOSPITAL ENCOUNTER (OUTPATIENT)
Dept: WOUND CARE | Age: 52
Discharge: HOME | End: 2017-04-26
Attending: INTERNAL MEDICINE | Admitting: INTERNAL MEDICINE

## 2017-04-26 PROCEDURE — 97597 DBRDMT OPN WND 1ST 20 CM/<: CPT | Performed by: INTERNAL MEDICINE

## 2017-04-26 PROCEDURE — 17250 CHEM CAUT OF GRANLTJ TISSUE: CPT | Performed by: INTERNAL MEDICINE

## 2017-04-26 PROCEDURE — 11043 DBRDMT MUSC&/FSCA 1ST 20/<: CPT | Performed by: INTERNAL MEDICINE

## 2017-04-26 ASSESSMENT — PAIN DESCRIPTION - LOCATION: LOCATION: NECK;SHOULDER

## 2017-04-26 ASSESSMENT — PAIN DESCRIPTION - PAIN TYPE: TYPE: CHRONIC PAIN

## 2017-04-26 ASSESSMENT — PAIN DESCRIPTION - PROGRESSION: CLINICAL_PROGRESSION: NOT CHANGED

## 2017-04-26 ASSESSMENT — PAIN DESCRIPTION - ORIENTATION: ORIENTATION: LEFT

## 2017-04-26 ASSESSMENT — PAIN SCALES - GENERAL: PAINLEVEL_OUTOF10: 3

## 2017-04-26 ASSESSMENT — PAIN DESCRIPTION - DESCRIPTORS: DESCRIPTORS: ACHING;BURNING;CONSTANT

## 2017-04-26 ASSESSMENT — PAIN DESCRIPTION - FREQUENCY: FREQUENCY: CONTINUOUS

## 2017-04-26 ASSESSMENT — PAIN DESCRIPTION - ONSET: ONSET: ON-GOING

## 2017-04-27 VITALS
HEART RATE: 78 BPM | BODY MASS INDEX: 34.37 KG/M2 | RESPIRATION RATE: 20 BRPM | SYSTOLIC BLOOD PRESSURE: 120 MMHG | TEMPERATURE: 96.7 F | DIASTOLIC BLOOD PRESSURE: 72 MMHG | HEIGHT: 74 IN | WEIGHT: 267.8 LBS

## 2017-05-03 ENCOUNTER — HOSPITAL ENCOUNTER (OUTPATIENT)
Dept: WOUND CARE | Age: 52
Discharge: HOME | End: 2017-05-03
Attending: INTERNAL MEDICINE | Admitting: INTERNAL MEDICINE

## 2017-05-03 VITALS
HEART RATE: 74 BPM | DIASTOLIC BLOOD PRESSURE: 65 MMHG | TEMPERATURE: 97.6 F | RESPIRATION RATE: 20 BRPM | BODY MASS INDEX: 33.7 KG/M2 | SYSTOLIC BLOOD PRESSURE: 116 MMHG | WEIGHT: 262.5 LBS

## 2017-05-03 PROCEDURE — 11043 DBRDMT MUSC&/FSCA 1ST 20/<: CPT | Performed by: INTERNAL MEDICINE

## 2017-05-03 PROCEDURE — 97597 DBRDMT OPN WND 1ST 20 CM/<: CPT | Performed by: INTERNAL MEDICINE

## 2017-05-03 PROCEDURE — 17250 CHEM CAUT OF GRANLTJ TISSUE: CPT | Performed by: INTERNAL MEDICINE

## 2017-05-03 ASSESSMENT — PAIN DESCRIPTION - PAIN TYPE: TYPE: CHRONIC PAIN

## 2017-05-03 ASSESSMENT — PAIN SCALES - GENERAL: PAINLEVEL_OUTOF10: 4

## 2017-05-03 ASSESSMENT — PAIN DESCRIPTION - ONSET: ONSET: ON-GOING

## 2017-05-03 ASSESSMENT — PAIN DESCRIPTION - LOCATION: LOCATION: NECK;SHOULDER

## 2017-05-03 ASSESSMENT — PAIN DESCRIPTION - DESCRIPTORS: DESCRIPTORS: ACHING;BURNING;CONSTANT

## 2017-05-03 ASSESSMENT — PAIN DESCRIPTION - PROGRESSION: CLINICAL_PROGRESSION: NOT CHANGED

## 2017-05-03 ASSESSMENT — PAIN DESCRIPTION - ORIENTATION: ORIENTATION: LEFT

## 2017-05-03 ASSESSMENT — PAIN DESCRIPTION - FREQUENCY: FREQUENCY: CONTINUOUS

## 2017-05-10 ENCOUNTER — HOSPITAL ENCOUNTER (OUTPATIENT)
Dept: WOUND CARE | Age: 52
Discharge: HOME | End: 2017-05-10
Attending: INTERNAL MEDICINE | Admitting: INTERNAL MEDICINE

## 2017-05-10 VITALS
SYSTOLIC BLOOD PRESSURE: 122 MMHG | DIASTOLIC BLOOD PRESSURE: 79 MMHG | RESPIRATION RATE: 20 BRPM | HEART RATE: 67 BPM | TEMPERATURE: 97.4 F

## 2017-05-10 PROCEDURE — 97597 DBRDMT OPN WND 1ST 20 CM/<: CPT | Performed by: INTERNAL MEDICINE

## 2017-05-10 PROCEDURE — 17250 CHEM CAUT OF GRANLTJ TISSUE: CPT | Performed by: INTERNAL MEDICINE

## 2017-05-10 PROCEDURE — 97605 NEG PRS WND THER DME<=50SQCM: CPT | Performed by: INTERNAL MEDICINE

## 2017-05-10 PROCEDURE — 11044 DBRDMT BONE 1ST 20 SQ CM/<: CPT | Performed by: INTERNAL MEDICINE

## 2017-05-10 ASSESSMENT — PAIN DESCRIPTION - DESCRIPTORS: DESCRIPTORS: ACHING;BURNING;CONSTANT

## 2017-05-10 ASSESSMENT — PAIN DESCRIPTION - LOCATION: LOCATION: NECK;SHOULDER

## 2017-05-10 ASSESSMENT — PAIN SCALES - GENERAL: PAINLEVEL_OUTOF10: 3

## 2017-05-10 ASSESSMENT — PAIN DESCRIPTION - ONSET: ONSET: ON-GOING

## 2017-05-10 ASSESSMENT — PAIN DESCRIPTION - FREQUENCY: FREQUENCY: CONTINUOUS

## 2017-05-10 ASSESSMENT — PAIN DESCRIPTION - ORIENTATION: ORIENTATION: LEFT

## 2017-05-10 ASSESSMENT — PAIN DESCRIPTION - PROGRESSION: CLINICAL_PROGRESSION: NOT CHANGED

## 2017-05-11 RX ORDER — SYRINGE AND NEEDLE,INSULIN,1ML 31 GX5/16"
SYRINGE, EMPTY DISPOSABLE MISCELLANEOUS
Qty: 100 EACH | Refills: 10 | Status: SHIPPED | OUTPATIENT
Start: 2017-05-11 | End: 2018-08-25

## 2017-05-14 PROBLEM — L97.524 DIABETIC ULCER OF LEFT FOOT WITH NECROSIS OF BONE (HCC): Status: ACTIVE | Noted: 2017-02-13

## 2017-05-17 ENCOUNTER — HOSPITAL ENCOUNTER (OUTPATIENT)
Dept: WOUND CARE | Age: 52
Discharge: HOME | End: 2017-05-17
Attending: INTERNAL MEDICINE | Admitting: INTERNAL MEDICINE

## 2017-05-17 VITALS
HEART RATE: 78 BPM | RESPIRATION RATE: 18 BRPM | DIASTOLIC BLOOD PRESSURE: 77 MMHG | TEMPERATURE: 97.5 F | SYSTOLIC BLOOD PRESSURE: 111 MMHG

## 2017-05-17 DIAGNOSIS — E11.621: ICD-10-CM

## 2017-05-17 DIAGNOSIS — L97.524: ICD-10-CM

## 2017-05-17 DIAGNOSIS — M86.672 CHRONIC OSTEOMYELITIS OF LEFT FOOT (HCC): Primary | ICD-10-CM

## 2017-05-17 PROCEDURE — 17250 CHEM CAUT OF GRANLTJ TISSUE: CPT | Performed by: INTERNAL MEDICINE

## 2017-05-17 PROCEDURE — 97605 NEG PRS WND THER DME<=50SQCM: CPT | Performed by: INTERNAL MEDICINE

## 2017-05-17 PROCEDURE — 97597 DBRDMT OPN WND 1ST 20 CM/<: CPT | Performed by: INTERNAL MEDICINE

## 2017-05-22 ENCOUNTER — HOSPITAL ENCOUNTER (OUTPATIENT)
Dept: WOUND CARE | Age: 52
Discharge: HOME | End: 2017-05-22
Attending: PODIATRIST | Admitting: PODIATRIST

## 2017-05-22 ENCOUNTER — HOSPITAL ENCOUNTER (OUTPATIENT)
Dept: OTHER | Age: 52
Discharge: HOME | End: 2017-05-22
Attending: INTERNAL MEDICINE | Admitting: INTERNAL MEDICINE

## 2017-05-22 VITALS
HEART RATE: 68 BPM | BODY MASS INDEX: 34.31 KG/M2 | RESPIRATION RATE: 18 BRPM | WEIGHT: 267.3 LBS | TEMPERATURE: 97 F | SYSTOLIC BLOOD PRESSURE: 120 MMHG | HEIGHT: 74 IN | DIASTOLIC BLOOD PRESSURE: 75 MMHG

## 2017-05-22 DIAGNOSIS — E11.621: ICD-10-CM

## 2017-05-22 DIAGNOSIS — L97.524: ICD-10-CM

## 2017-05-22 PROCEDURE — 17250 CHEM CAUT OF GRANLTJ TISSUE: CPT | Performed by: INTERNAL MEDICINE

## 2017-05-22 ASSESSMENT — PAIN DESCRIPTION - ONSET: ONSET: ON-GOING

## 2017-05-22 ASSESSMENT — PAIN DESCRIPTION - FREQUENCY: FREQUENCY: CONTINUOUS

## 2017-05-22 ASSESSMENT — PAIN SCALES - GENERAL: PAINLEVEL_OUTOF10: 3

## 2017-05-22 ASSESSMENT — PAIN DESCRIPTION - DESCRIPTORS: DESCRIPTORS: ACHING;BURNING;CONSTANT

## 2017-05-22 ASSESSMENT — PAIN DESCRIPTION - PAIN TYPE: TYPE: CHRONIC PAIN

## 2017-05-22 ASSESSMENT — PAIN DESCRIPTION - LOCATION: LOCATION: NECK;SHOULDER

## 2017-05-22 ASSESSMENT — PAIN DESCRIPTION - ORIENTATION: ORIENTATION: LEFT

## 2017-05-22 ASSESSMENT — PAIN DESCRIPTION - PROGRESSION: CLINICAL_PROGRESSION: NOT CHANGED

## 2017-05-23 RX ORDER — CEFDINIR 300 MG/1
300 CAPSULE ORAL 2 TIMES DAILY
Qty: 60 CAPSULE | Refills: 11 | Status: SHIPPED | OUTPATIENT
Start: 2017-05-23 | End: 2017-06-10

## 2017-05-25 ENCOUNTER — HOSPITAL ENCOUNTER (OUTPATIENT)
Dept: SURGERY | Age: 52
Discharge: HOME | End: 2017-05-25
Attending: PODIATRIST | Admitting: PODIATRIST

## 2017-05-25 VITALS
OXYGEN SATURATION: 95 % | DIASTOLIC BLOOD PRESSURE: 70 MMHG | HEIGHT: 74 IN | BODY MASS INDEX: 34.27 KG/M2 | HEART RATE: 75 BPM | WEIGHT: 267 LBS | RESPIRATION RATE: 16 BRPM | SYSTOLIC BLOOD PRESSURE: 130 MMHG | TEMPERATURE: 96.8 F

## 2017-05-25 LAB
GLUCOSE BLD-MCNC: 221 MG/DL (ref 70–99)
PERFORMED ON: ABNORMAL

## 2017-05-25 RX ORDER — HYDROMORPHONE HCL 110MG/55ML
0.5 PATIENT CONTROLLED ANALGESIA SYRINGE INTRAVENOUS EVERY 5 MIN PRN
Status: DISCONTINUED | OUTPATIENT
Start: 2017-05-25 | End: 2017-05-26 | Stop reason: HOSPADM

## 2017-05-25 RX ORDER — PROMETHAZINE HYDROCHLORIDE 25 MG/ML
6.25 INJECTION, SOLUTION INTRAMUSCULAR; INTRAVENOUS
Status: ACTIVE | OUTPATIENT
Start: 2017-05-25 | End: 2017-05-25

## 2017-05-25 RX ORDER — LABETALOL HYDROCHLORIDE 5 MG/ML
5 INJECTION, SOLUTION INTRAVENOUS EVERY 10 MIN PRN
Status: DISCONTINUED | OUTPATIENT
Start: 2017-05-25 | End: 2017-05-26 | Stop reason: HOSPADM

## 2017-05-25 RX ORDER — METOCLOPRAMIDE HYDROCHLORIDE 5 MG/ML
10 INJECTION INTRAMUSCULAR; INTRAVENOUS
Status: ACTIVE | OUTPATIENT
Start: 2017-05-25 | End: 2017-05-25

## 2017-05-25 RX ORDER — OXYCODONE HYDROCHLORIDE 5 MG/1
10 TABLET ORAL PRN
Status: ACTIVE | OUTPATIENT
Start: 2017-05-25 | End: 2017-05-25

## 2017-05-25 RX ORDER — OXYCODONE HYDROCHLORIDE 5 MG/1
5 TABLET ORAL PRN
Status: ACTIVE | OUTPATIENT
Start: 2017-05-25 | End: 2017-05-25

## 2017-05-25 RX ORDER — HYDRALAZINE HYDROCHLORIDE 20 MG/ML
5 INJECTION INTRAMUSCULAR; INTRAVENOUS EVERY 10 MIN PRN
Status: DISCONTINUED | OUTPATIENT
Start: 2017-05-25 | End: 2017-05-26 | Stop reason: HOSPADM

## 2017-05-25 RX ORDER — MEPERIDINE HYDROCHLORIDE 25 MG/ML
12.5 INJECTION INTRAMUSCULAR; INTRAVENOUS; SUBCUTANEOUS EVERY 5 MIN PRN
Status: DISCONTINUED | OUTPATIENT
Start: 2017-05-25 | End: 2017-05-26 | Stop reason: HOSPADM

## 2017-05-25 RX ORDER — LIDOCAINE HYDROCHLORIDE 10 MG/ML
1 INJECTION, SOLUTION EPIDURAL; INFILTRATION; INTRACAUDAL; PERINEURAL
Status: COMPLETED | OUTPATIENT
Start: 2017-05-25 | End: 2017-05-25

## 2017-05-25 RX ORDER — SODIUM CHLORIDE, SODIUM LACTATE, POTASSIUM CHLORIDE, CALCIUM CHLORIDE 600; 310; 30; 20 MG/100ML; MG/100ML; MG/100ML; MG/100ML
INJECTION, SOLUTION INTRAVENOUS CONTINUOUS
Status: DISCONTINUED | OUTPATIENT
Start: 2017-05-25 | End: 2017-05-26 | Stop reason: HOSPADM

## 2017-05-25 RX ORDER — HYDROMORPHONE HCL 110MG/55ML
0.25 PATIENT CONTROLLED ANALGESIA SYRINGE INTRAVENOUS EVERY 5 MIN PRN
Status: DISCONTINUED | OUTPATIENT
Start: 2017-05-25 | End: 2017-05-26 | Stop reason: HOSPADM

## 2017-05-25 RX ADMIN — LIDOCAINE HYDROCHLORIDE 1 ML: 10 INJECTION, SOLUTION EPIDURAL; INFILTRATION; INTRACAUDAL; PERINEURAL at 08:48

## 2017-05-25 RX ADMIN — SODIUM CHLORIDE, SODIUM LACTATE, POTASSIUM CHLORIDE, CALCIUM CHLORIDE: 600; 310; 30; 20 INJECTION, SOLUTION INTRAVENOUS at 08:48

## 2017-05-25 ASSESSMENT — PAIN SCALES - GENERAL
PAINLEVEL_OUTOF10: 0
PAINLEVEL_OUTOF10: 0

## 2017-05-30 PROBLEM — M86.672 CHRONIC OSTEOMYELITIS OF LEFT FOOT (HCC): Status: ACTIVE | Noted: 2017-05-30

## 2017-05-30 LAB
ANAEROBIC CULTURE: ABNORMAL
CULTURE SURGICAL: ABNORMAL
GRAM STAIN RESULT: ABNORMAL
ORGANISM: ABNORMAL

## 2017-05-31 ENCOUNTER — HOSPITAL ENCOUNTER (OUTPATIENT)
Dept: GENERAL RADIOLOGY | Age: 52
Discharge: HOME | End: 2017-05-31
Attending: INTERNAL MEDICINE | Admitting: INTERNAL MEDICINE

## 2017-05-31 ENCOUNTER — HOSPITAL ENCOUNTER (OUTPATIENT)
Dept: WOUND CARE | Age: 52
Discharge: HOME | End: 2017-05-31
Attending: INTERNAL MEDICINE | Admitting: INTERNAL MEDICINE

## 2017-05-31 VITALS
RESPIRATION RATE: 16 BRPM | HEIGHT: 74 IN | WEIGHT: 267 LBS | BODY MASS INDEX: 34.27 KG/M2 | DIASTOLIC BLOOD PRESSURE: 61 MMHG | SYSTOLIC BLOOD PRESSURE: 117 MMHG | HEART RATE: 70 BPM

## 2017-05-31 VITALS
SYSTOLIC BLOOD PRESSURE: 125 MMHG | HEART RATE: 69 BPM | TEMPERATURE: 96.7 F | DIASTOLIC BLOOD PRESSURE: 77 MMHG | HEIGHT: 74 IN | BODY MASS INDEX: 34.28 KG/M2 | RESPIRATION RATE: 18 BRPM

## 2017-05-31 DIAGNOSIS — M86.672 CHRONIC OSTEOMYELITIS OF LEFT FOOT (HCC): ICD-10-CM

## 2017-05-31 DIAGNOSIS — M86.672 OTHER CHRONIC OSTEOMYELITIS, LEFT ANKLE AND FOOT (HCC): ICD-10-CM

## 2017-05-31 LAB
A/G RATIO: 0.8 (ref 1.1–2.2)
ALBUMIN SERPL-MCNC: 3.5 G/DL (ref 3.4–5)
ALP BLD-CCNC: 110 U/L (ref 40–129)
ALT SERPL-CCNC: 13 U/L (ref 10–40)
ANION GAP SERPL CALCULATED.3IONS-SCNC: 17 MMOL/L (ref 3–16)
APTT: 28 SEC (ref 21–31.8)
AST SERPL-CCNC: 12 U/L (ref 15–37)
BASOPHILS ABSOLUTE: 0.1 K/UL (ref 0–0.2)
BASOPHILS RELATIVE PERCENT: 1.2 %
BILIRUB SERPL-MCNC: <0.2 MG/DL (ref 0–1)
BUN BLDV-MCNC: 29 MG/DL (ref 7–20)
C-REACTIVE PROTEIN: 41.7 MG/L (ref 0–5.1)
CALCIUM SERPL-MCNC: 9.2 MG/DL (ref 8.3–10.6)
CHLORIDE BLD-SCNC: 95 MMOL/L (ref 99–110)
CO2: 23 MMOL/L (ref 21–32)
CREAT SERPL-MCNC: 0.8 MG/DL (ref 0.9–1.3)
EOSINOPHILS ABSOLUTE: 0.2 K/UL (ref 0–0.6)
EOSINOPHILS RELATIVE PERCENT: 2.2 %
GFR AFRICAN AMERICAN: >60
GFR NON-AFRICAN AMERICAN: >60
GLOBULIN: 4.2 G/DL
GLUCOSE BLD-MCNC: 232 MG/DL (ref 70–99)
HCT VFR BLD CALC: 38.7 % (ref 40.5–52.5)
HEMOGLOBIN: 12.9 G/DL (ref 13.5–17.5)
INR BLD: 1.01 (ref 0.85–1.15)
LYMPHOCYTES ABSOLUTE: 2 K/UL (ref 1–5.1)
LYMPHOCYTES RELATIVE PERCENT: 23.7 %
MCH RBC QN AUTO: 27.4 PG (ref 26–34)
MCHC RBC AUTO-ENTMCNC: 33.3 G/DL (ref 31–36)
MCV RBC AUTO: 82.5 FL (ref 80–100)
MONOCYTES ABSOLUTE: 0.6 K/UL (ref 0–1.3)
MONOCYTES RELATIVE PERCENT: 7.4 %
NEUTROPHILS ABSOLUTE: 5.6 K/UL (ref 1.7–7.7)
NEUTROPHILS RELATIVE PERCENT: 65.5 %
PDW BLD-RTO: 15.7 % (ref 12.4–15.4)
PLATELET # BLD: 256 K/UL (ref 135–450)
PMV BLD AUTO: 9.6 FL (ref 5–10.5)
POTASSIUM SERPL-SCNC: 4.1 MMOL/L (ref 3.5–5.1)
PROTHROMBIN TIME: 11.4 SEC (ref 9.6–13)
RBC # BLD: 4.69 M/UL (ref 4.2–5.9)
SEDIMENTATION RATE, ERYTHROCYTE: 60 MM/HR (ref 0–15)
SODIUM BLD-SCNC: 135 MMOL/L (ref 136–145)
TOTAL CK: 50 U/L (ref 39–308)
TOTAL PROTEIN: 7.7 G/DL (ref 6.4–8.2)
WBC # BLD: 8.5 K/UL (ref 4–11)

## 2017-05-31 PROCEDURE — 97597 DBRDMT OPN WND 1ST 20 CM/<: CPT | Performed by: INTERNAL MEDICINE

## 2017-05-31 PROCEDURE — 17250 CHEM CAUT OF GRANLTJ TISSUE: CPT | Performed by: INTERNAL MEDICINE

## 2017-05-31 PROCEDURE — 97605 NEG PRS WND THER DME<=50SQCM: CPT | Performed by: INTERNAL MEDICINE

## 2017-05-31 PROCEDURE — 99214 OFFICE O/P EST MOD 30 MIN: CPT | Performed by: INTERNAL MEDICINE

## 2017-05-31 ASSESSMENT — PAIN DESCRIPTION - LOCATION
LOCATION: NECK;SHOULDER
LOCATION: NECK;SHOULDER

## 2017-05-31 ASSESSMENT — PAIN DESCRIPTION - PROGRESSION
CLINICAL_PROGRESSION: NOT CHANGED
CLINICAL_PROGRESSION: NOT CHANGED

## 2017-05-31 ASSESSMENT — PAIN DESCRIPTION - ONSET
ONSET: ON-GOING
ONSET: ON-GOING

## 2017-05-31 ASSESSMENT — PAIN DESCRIPTION - DESCRIPTORS
DESCRIPTORS: BURNING;STABBING
DESCRIPTORS: BURNING;STABBING

## 2017-05-31 ASSESSMENT — PAIN DESCRIPTION - ORIENTATION
ORIENTATION: LEFT
ORIENTATION: LEFT

## 2017-05-31 ASSESSMENT — PAIN DESCRIPTION - FREQUENCY
FREQUENCY: CONTINUOUS
FREQUENCY: CONTINUOUS

## 2017-05-31 ASSESSMENT — PAIN SCALES - GENERAL
PAINLEVEL_OUTOF10: 3
PAINLEVEL_OUTOF10: 3

## 2017-05-31 ASSESSMENT — PAIN DESCRIPTION - PAIN TYPE
TYPE: CHRONIC PAIN
TYPE: CHRONIC PAIN

## 2017-06-05 RX ORDER — FLUCONAZOLE 100 MG/1
100 TABLET ORAL DAILY
Qty: 7 TABLET | Refills: 3 | Status: SHIPPED | OUTPATIENT
Start: 2017-06-05 | End: 2017-06-25

## 2017-06-07 ENCOUNTER — HOSPITAL ENCOUNTER (OUTPATIENT)
Dept: WOUND CARE | Age: 52
Discharge: HOME | End: 2017-06-07
Attending: INTERNAL MEDICINE | Admitting: INTERNAL MEDICINE

## 2017-06-07 VITALS
DIASTOLIC BLOOD PRESSURE: 65 MMHG | RESPIRATION RATE: 18 BRPM | WEIGHT: 267 LBS | HEIGHT: 74 IN | BODY MASS INDEX: 34.27 KG/M2 | SYSTOLIC BLOOD PRESSURE: 110 MMHG | TEMPERATURE: 97 F | HEART RATE: 74 BPM

## 2017-06-07 DIAGNOSIS — M86.672 OTHER CHRONIC OSTEOMYELITIS, LEFT ANKLE AND FOOT (HCC): ICD-10-CM

## 2017-06-07 DIAGNOSIS — E11.622 DIABETIC ULCER OF LEFT LOWER LEG, LIMITED TO BREAKDOWN OF SKIN (HCC): ICD-10-CM

## 2017-06-07 DIAGNOSIS — L97.921 DIABETIC ULCER OF LEFT LOWER LEG, LIMITED TO BREAKDOWN OF SKIN (HCC): ICD-10-CM

## 2017-06-07 PROCEDURE — 11042 DBRDMT SUBQ TIS 1ST 20SQCM/<: CPT | Performed by: INTERNAL MEDICINE

## 2017-06-07 PROCEDURE — 97597 DBRDMT OPN WND 1ST 20 CM/<: CPT | Performed by: INTERNAL MEDICINE

## 2017-06-07 PROCEDURE — 97605 NEG PRS WND THER DME<=50SQCM: CPT | Performed by: INTERNAL MEDICINE

## 2017-06-07 PROCEDURE — 17250 CHEM CAUT OF GRANLTJ TISSUE: CPT | Performed by: INTERNAL MEDICINE

## 2017-06-07 ASSESSMENT — PAIN DESCRIPTION - DIRECTION: RADIATING_TOWARDS: DENIES

## 2017-06-07 ASSESSMENT — PAIN DESCRIPTION - PAIN TYPE: TYPE: CHRONIC PAIN

## 2017-06-07 ASSESSMENT — PAIN DESCRIPTION - LOCATION: LOCATION: SHOULDER;NECK

## 2017-06-07 ASSESSMENT — PAIN DESCRIPTION - DESCRIPTORS: DESCRIPTORS: BURNING;STABBING

## 2017-06-07 ASSESSMENT — PAIN DESCRIPTION - PROGRESSION: CLINICAL_PROGRESSION: NOT CHANGED

## 2017-06-07 ASSESSMENT — ACTIVITIES OF DAILY LIVING (ADL): EFFECT OF PAIN ON DAILY ACTIVITIES: DENIES

## 2017-06-07 ASSESSMENT — PAIN DESCRIPTION - ORIENTATION: ORIENTATION: LEFT

## 2017-06-07 ASSESSMENT — PAIN SCALES - GENERAL: PAINLEVEL_OUTOF10: 3

## 2017-06-07 ASSESSMENT — PAIN DESCRIPTION - FREQUENCY: FREQUENCY: CONTINUOUS

## 2017-06-07 ASSESSMENT — PAIN DESCRIPTION - ONSET: ONSET: ON-GOING

## 2017-06-10 PROBLEM — E11.622 DIABETIC ULCER OF LEFT LOWER LEG, LIMITED TO BREAKDOWN OF SKIN (HCC): Status: ACTIVE | Noted: 2017-06-10

## 2017-06-10 PROBLEM — L97.921 DIABETIC ULCER OF LEFT LOWER LEG, LIMITED TO BREAKDOWN OF SKIN (HCC): Status: ACTIVE | Noted: 2017-06-10

## 2017-06-10 RX ORDER — DAPTOMYCIN 50 MG/ML
750 INJECTION, POWDER, LYOPHILIZED, FOR SOLUTION INTRAVENOUS DAILY
COMMUNITY
Start: 2017-06-01 | End: 2017-07-12

## 2017-06-10 RX ORDER — ERTAPENEM 1 G/1
1000 INJECTION, POWDER, LYOPHILIZED, FOR SOLUTION INTRAMUSCULAR; INTRAVENOUS EVERY 24 HOURS
COMMUNITY
Start: 2017-06-01 | End: 2017-07-12

## 2017-06-14 ENCOUNTER — HOSPITAL ENCOUNTER (OUTPATIENT)
Dept: WOUND CARE | Age: 52
Discharge: HOME | End: 2017-06-14
Attending: INTERNAL MEDICINE | Admitting: INTERNAL MEDICINE

## 2017-06-14 VITALS
SYSTOLIC BLOOD PRESSURE: 118 MMHG | RESPIRATION RATE: 21 BRPM | TEMPERATURE: 97.2 F | HEART RATE: 78 BPM | DIASTOLIC BLOOD PRESSURE: 74 MMHG | HEIGHT: 74 IN

## 2017-06-14 PROCEDURE — 97605 NEG PRS WND THER DME<=50SQCM: CPT | Performed by: INTERNAL MEDICINE

## 2017-06-14 PROCEDURE — 97597 DBRDMT OPN WND 1ST 20 CM/<: CPT | Performed by: INTERNAL MEDICINE

## 2017-06-14 PROCEDURE — 17250 CHEM CAUT OF GRANLTJ TISSUE: CPT | Performed by: INTERNAL MEDICINE

## 2017-06-14 ASSESSMENT — PAIN DESCRIPTION - DESCRIPTORS: DESCRIPTORS: BURNING;STABBING

## 2017-06-14 ASSESSMENT — PAIN DESCRIPTION - ONSET: ONSET: ON-GOING

## 2017-06-14 ASSESSMENT — PAIN DESCRIPTION - FREQUENCY: FREQUENCY: CONTINUOUS

## 2017-06-14 ASSESSMENT — PAIN DESCRIPTION - PAIN TYPE: TYPE: CHRONIC PAIN

## 2017-06-14 ASSESSMENT — PAIN DESCRIPTION - PROGRESSION: CLINICAL_PROGRESSION: NOT CHANGED

## 2017-06-14 ASSESSMENT — PAIN DESCRIPTION - LOCATION: LOCATION: NECK;SHOULDER

## 2017-06-14 ASSESSMENT — PAIN DESCRIPTION - ORIENTATION: ORIENTATION: LEFT

## 2017-06-14 ASSESSMENT — PAIN SCALES - GENERAL: PAINLEVEL_OUTOF10: 3

## 2017-06-19 ENCOUNTER — HOSPITAL ENCOUNTER (OUTPATIENT)
Dept: OTHER | Age: 52
Discharge: HOME | End: 2017-06-30
Attending: INTERNAL MEDICINE | Admitting: INTERNAL MEDICINE

## 2017-06-21 ENCOUNTER — HOSPITAL ENCOUNTER (OUTPATIENT)
Dept: WOUND CARE | Age: 52
Discharge: HOME | End: 2017-06-21
Attending: INTERNAL MEDICINE | Admitting: INTERNAL MEDICINE

## 2017-06-21 VITALS
HEART RATE: 67 BPM | DIASTOLIC BLOOD PRESSURE: 72 MMHG | BODY MASS INDEX: 33.75 KG/M2 | HEIGHT: 74 IN | WEIGHT: 263 LBS | SYSTOLIC BLOOD PRESSURE: 117 MMHG | TEMPERATURE: 97.3 F | RESPIRATION RATE: 18 BRPM

## 2017-06-21 PROCEDURE — 97597 DBRDMT OPN WND 1ST 20 CM/<: CPT | Performed by: INTERNAL MEDICINE

## 2017-06-21 PROCEDURE — 97605 NEG PRS WND THER DME<=50SQCM: CPT | Performed by: INTERNAL MEDICINE

## 2017-06-21 PROCEDURE — 15275 SKIN SUB GRAFT FACE/NK/HF/G: CPT | Performed by: INTERNAL MEDICINE

## 2017-06-21 ASSESSMENT — PAIN DESCRIPTION - ONSET: ONSET: ON-GOING

## 2017-06-21 ASSESSMENT — PAIN DESCRIPTION - ORIENTATION: ORIENTATION: LEFT

## 2017-06-21 ASSESSMENT — PAIN DESCRIPTION - PROGRESSION: CLINICAL_PROGRESSION: NOT CHANGED

## 2017-06-21 ASSESSMENT — PAIN SCALES - GENERAL: PAINLEVEL_OUTOF10: 3

## 2017-06-21 ASSESSMENT — ACTIVITIES OF DAILY LIVING (ADL): EFFECT OF PAIN ON DAILY ACTIVITIES: DENIES

## 2017-06-21 ASSESSMENT — PAIN DESCRIPTION - PAIN TYPE: TYPE: CHRONIC PAIN

## 2017-06-21 ASSESSMENT — PAIN DESCRIPTION - FREQUENCY: FREQUENCY: CONTINUOUS

## 2017-06-21 ASSESSMENT — PAIN DESCRIPTION - LOCATION: LOCATION: NECK;SHOULDER

## 2017-06-21 ASSESSMENT — PAIN DESCRIPTION - DESCRIPTORS: DESCRIPTORS: BURNING

## 2017-06-26 LAB
A/G RATIO: 0.9 (ref 1.1–2.2)
ALBUMIN SERPL-MCNC: 3.5 G/DL (ref 3.4–5)
ALP BLD-CCNC: 123 U/L (ref 40–129)
ALT SERPL-CCNC: 16 U/L (ref 10–40)
ANION GAP SERPL CALCULATED.3IONS-SCNC: 15 MMOL/L (ref 3–16)
AST SERPL-CCNC: 16 U/L (ref 15–37)
BASOPHILS ABSOLUTE: 0.1 K/UL (ref 0–0.2)
BASOPHILS RELATIVE PERCENT: 1 %
BILIRUB SERPL-MCNC: <0.2 MG/DL (ref 0–1)
BUN BLDV-MCNC: 28 MG/DL (ref 7–20)
C-REACTIVE PROTEIN: 37.1 MG/L (ref 0–5.1)
CALCIUM SERPL-MCNC: 9 MG/DL (ref 8.3–10.6)
CHLORIDE BLD-SCNC: 94 MMOL/L (ref 99–110)
CO2: 25 MMOL/L (ref 21–32)
CREAT SERPL-MCNC: 0.7 MG/DL (ref 0.9–1.3)
EOSINOPHILS ABSOLUTE: 0.4 K/UL (ref 0–0.6)
EOSINOPHILS RELATIVE PERCENT: 4.9 %
GFR AFRICAN AMERICAN: >60
GFR NON-AFRICAN AMERICAN: >60
GLOBULIN: 4.1 G/DL
GLUCOSE BLD-MCNC: 181 MG/DL (ref 70–99)
HCT VFR BLD CALC: 38 % (ref 40.5–52.5)
HEMOGLOBIN: 12.7 G/DL (ref 13.5–17.5)
LYMPHOCYTES ABSOLUTE: 2.2 K/UL (ref 1–5.1)
LYMPHOCYTES RELATIVE PERCENT: 24.3 %
MCH RBC QN AUTO: 27 PG (ref 26–34)
MCHC RBC AUTO-ENTMCNC: 33.4 G/DL (ref 31–36)
MCV RBC AUTO: 80.9 FL (ref 80–100)
MONOCYTES ABSOLUTE: 0.6 K/UL (ref 0–1.3)
MONOCYTES RELATIVE PERCENT: 6.6 %
NEUTROPHILS ABSOLUTE: 5.6 K/UL (ref 1.7–7.7)
NEUTROPHILS RELATIVE PERCENT: 63.2 %
PDW BLD-RTO: 14.9 % (ref 12.4–15.4)
PLATELET # BLD: 265 K/UL (ref 135–450)
PMV BLD AUTO: 10.8 FL (ref 5–10.5)
POTASSIUM SERPL-SCNC: 4.1 MMOL/L (ref 3.5–5.1)
RBC # BLD: 4.7 M/UL (ref 4.2–5.9)
SEDIMENTATION RATE, ERYTHROCYTE: 74 MM/HR (ref 0–15)
SODIUM BLD-SCNC: 134 MMOL/L (ref 136–145)
TOTAL CK: 93 U/L (ref 39–308)
TOTAL PROTEIN: 7.6 G/DL (ref 6.4–8.2)
WBC # BLD: 8.9 K/UL (ref 4–11)

## 2017-06-27 ENCOUNTER — HOSPITAL ENCOUNTER (OUTPATIENT)
Dept: WOUND CARE | Age: 52
Discharge: HOME | End: 2017-06-27
Attending: INTERNAL MEDICINE | Admitting: INTERNAL MEDICINE

## 2017-06-27 VITALS
WEIGHT: 262 LBS | BODY MASS INDEX: 33.62 KG/M2 | TEMPERATURE: 97.6 F | DIASTOLIC BLOOD PRESSURE: 70 MMHG | HEIGHT: 74 IN | SYSTOLIC BLOOD PRESSURE: 105 MMHG | RESPIRATION RATE: 18 BRPM | HEART RATE: 67 BPM

## 2017-06-27 PROCEDURE — 99214 OFFICE O/P EST MOD 30 MIN: CPT | Performed by: INTERNAL MEDICINE

## 2017-06-27 PROCEDURE — 97605 NEG PRS WND THER DME<=50SQCM: CPT | Performed by: INTERNAL MEDICINE

## 2017-06-27 PROCEDURE — 15275 SKIN SUB GRAFT FACE/NK/HF/G: CPT | Performed by: INTERNAL MEDICINE

## 2017-06-27 PROCEDURE — 97597 DBRDMT OPN WND 1ST 20 CM/<: CPT | Performed by: INTERNAL MEDICINE

## 2017-06-27 PROCEDURE — 17250 CHEM CAUT OF GRANLTJ TISSUE: CPT | Performed by: INTERNAL MEDICINE

## 2017-06-27 ASSESSMENT — PAIN SCALES - GENERAL: PAINLEVEL_OUTOF10: 3

## 2017-06-27 ASSESSMENT — PAIN DESCRIPTION - PAIN TYPE: TYPE: CHRONIC PAIN

## 2017-06-27 ASSESSMENT — PAIN DESCRIPTION - LOCATION: LOCATION: SHOULDER;ANKLE

## 2017-06-27 ASSESSMENT — PAIN DESCRIPTION - ONSET: ONSET: ON-GOING

## 2017-06-27 ASSESSMENT — PAIN DESCRIPTION - FREQUENCY: FREQUENCY: CONTINUOUS

## 2017-06-27 ASSESSMENT — PAIN DESCRIPTION - PROGRESSION: CLINICAL_PROGRESSION: NOT CHANGED

## 2017-06-27 ASSESSMENT — PAIN DESCRIPTION - DESCRIPTORS: DESCRIPTORS: ACHING

## 2017-06-27 ASSESSMENT — PAIN DESCRIPTION - ORIENTATION: ORIENTATION: LEFT

## 2017-07-01 ENCOUNTER — HOSPITAL ENCOUNTER (OUTPATIENT)
Dept: OTHER | Age: 52
Discharge: HOME | End: 2017-07-31
Attending: INTERNAL MEDICINE | Admitting: INTERNAL MEDICINE

## 2017-07-03 LAB
A/G RATIO: 0.9 (ref 1.1–2.2)
ALBUMIN SERPL-MCNC: 3.4 G/DL (ref 3.4–5)
ALP BLD-CCNC: 125 U/L (ref 40–129)
ALT SERPL-CCNC: 15 U/L (ref 10–40)
ANION GAP SERPL CALCULATED.3IONS-SCNC: 15 MMOL/L (ref 3–16)
AST SERPL-CCNC: 17 U/L (ref 15–37)
BASOPHILS ABSOLUTE: 0.1 K/UL (ref 0–0.2)
BASOPHILS RELATIVE PERCENT: 0.9 %
BILIRUB SERPL-MCNC: <0.2 MG/DL (ref 0–1)
BUN BLDV-MCNC: 24 MG/DL (ref 7–20)
C-REACTIVE PROTEIN: 37.6 MG/L (ref 0–5.1)
CALCIUM SERPL-MCNC: 9.1 MG/DL (ref 8.3–10.6)
CHLORIDE BLD-SCNC: 94 MMOL/L (ref 99–110)
CO2: 26 MMOL/L (ref 21–32)
CREAT SERPL-MCNC: 0.6 MG/DL (ref 0.9–1.3)
EOSINOPHILS ABSOLUTE: 0.4 K/UL (ref 0–0.6)
EOSINOPHILS RELATIVE PERCENT: 4.9 %
GFR AFRICAN AMERICAN: >60
GFR NON-AFRICAN AMERICAN: >60
GLOBULIN: 4 G/DL
GLUCOSE BLD-MCNC: 166 MG/DL (ref 70–99)
HCT VFR BLD CALC: 37.4 % (ref 40.5–52.5)
HEMOGLOBIN: 12.4 G/DL (ref 13.5–17.5)
LYMPHOCYTES ABSOLUTE: 2.1 K/UL (ref 1–5.1)
LYMPHOCYTES RELATIVE PERCENT: 23.9 %
MCH RBC QN AUTO: 27.1 PG (ref 26–34)
MCHC RBC AUTO-ENTMCNC: 33.1 G/DL (ref 31–36)
MCV RBC AUTO: 81.9 FL (ref 80–100)
MONOCYTES ABSOLUTE: 0.6 K/UL (ref 0–1.3)
MONOCYTES RELATIVE PERCENT: 7 %
NEUTROPHILS ABSOLUTE: 5.6 K/UL (ref 1.7–7.7)
NEUTROPHILS RELATIVE PERCENT: 63.3 %
PDW BLD-RTO: 15.3 % (ref 12.4–15.4)
PLATELET # BLD: 236 K/UL (ref 135–450)
PMV BLD AUTO: 10.2 FL (ref 5–10.5)
POTASSIUM SERPL-SCNC: 3.9 MMOL/L (ref 3.5–5.1)
RBC # BLD: 4.56 M/UL (ref 4.2–5.9)
SEDIMENTATION RATE, ERYTHROCYTE: 54 MM/HR (ref 0–15)
SODIUM BLD-SCNC: 135 MMOL/L (ref 136–145)
TOTAL CK: 67 U/L (ref 39–308)
TOTAL PROTEIN: 7.4 G/DL (ref 6.4–8.2)
WBC # BLD: 8.8 K/UL (ref 4–11)

## 2017-07-05 ENCOUNTER — HOSPITAL ENCOUNTER (OUTPATIENT)
Dept: WOUND CARE | Age: 52
Discharge: HOME | End: 2017-07-05
Attending: INTERNAL MEDICINE | Admitting: INTERNAL MEDICINE

## 2017-07-05 VITALS
HEIGHT: 74 IN | RESPIRATION RATE: 18 BRPM | SYSTOLIC BLOOD PRESSURE: 116 MMHG | HEART RATE: 75 BPM | DIASTOLIC BLOOD PRESSURE: 72 MMHG | TEMPERATURE: 98.1 F | WEIGHT: 262 LBS | BODY MASS INDEX: 33.62 KG/M2

## 2017-07-05 DIAGNOSIS — L60.3 DYSTROPHIC NAIL: ICD-10-CM

## 2017-07-05 DIAGNOSIS — B35.1 ONYCHOMYCOSIS: ICD-10-CM

## 2017-07-05 PROCEDURE — 97605 NEG PRS WND THER DME<=50SQCM: CPT | Performed by: INTERNAL MEDICINE

## 2017-07-05 PROCEDURE — 97597 DBRDMT OPN WND 1ST 20 CM/<: CPT | Performed by: INTERNAL MEDICINE

## 2017-07-05 PROCEDURE — 11043 DBRDMT MUSC&/FSCA 1ST 20/<: CPT | Performed by: INTERNAL MEDICINE

## 2017-07-05 PROCEDURE — 11721 DEBRIDE NAIL 6 OR MORE: CPT | Performed by: INTERNAL MEDICINE

## 2017-07-05 PROCEDURE — 17250 CHEM CAUT OF GRANLTJ TISSUE: CPT | Performed by: INTERNAL MEDICINE

## 2017-07-05 PROCEDURE — 11042 DBRDMT SUBQ TIS 1ST 20SQCM/<: CPT | Performed by: INTERNAL MEDICINE

## 2017-07-05 ASSESSMENT — PAIN DESCRIPTION - FREQUENCY: FREQUENCY: CONTINUOUS

## 2017-07-05 ASSESSMENT — PAIN DESCRIPTION - PROGRESSION: CLINICAL_PROGRESSION: NOT CHANGED

## 2017-07-05 ASSESSMENT — PAIN DESCRIPTION - ORIENTATION: ORIENTATION: MID

## 2017-07-05 ASSESSMENT — PAIN DESCRIPTION - PAIN TYPE: TYPE: CHRONIC PAIN

## 2017-07-05 ASSESSMENT — PAIN DESCRIPTION - ONSET: ONSET: ON-GOING

## 2017-07-05 ASSESSMENT — PAIN DESCRIPTION - LOCATION: LOCATION: SHOULDER

## 2017-07-05 ASSESSMENT — PAIN DESCRIPTION - DESCRIPTORS: DESCRIPTORS: STABBING;BURNING

## 2017-07-05 ASSESSMENT — PAIN SCALES - GENERAL: PAINLEVEL_OUTOF10: 3

## 2017-07-10 PROBLEM — L60.3 DYSTROPHIC NAIL: Status: ACTIVE | Noted: 2017-07-10

## 2017-07-10 PROBLEM — B35.1 ONYCHOMYCOSIS: Status: ACTIVE | Noted: 2017-07-10

## 2017-07-12 ENCOUNTER — HOSPITAL ENCOUNTER (OUTPATIENT)
Dept: WOUND CARE | Age: 52
Discharge: HOME | End: 2017-07-12
Attending: INTERNAL MEDICINE | Admitting: INTERNAL MEDICINE

## 2017-07-12 VITALS
HEART RATE: 84 BPM | HEIGHT: 74 IN | TEMPERATURE: 97.9 F | BODY MASS INDEX: 33.62 KG/M2 | SYSTOLIC BLOOD PRESSURE: 136 MMHG | WEIGHT: 262 LBS | RESPIRATION RATE: 18 BRPM | DIASTOLIC BLOOD PRESSURE: 81 MMHG

## 2017-07-12 PROCEDURE — 97597 DBRDMT OPN WND 1ST 20 CM/<: CPT | Performed by: INTERNAL MEDICINE

## 2017-07-12 PROCEDURE — 11043 DBRDMT MUSC&/FSCA 1ST 20/<: CPT | Performed by: INTERNAL MEDICINE

## 2017-07-12 PROCEDURE — 97605 NEG PRS WND THER DME<=50SQCM: CPT | Performed by: INTERNAL MEDICINE

## 2017-07-12 ASSESSMENT — PAIN DESCRIPTION - LOCATION: LOCATION: SHOULDER

## 2017-07-12 ASSESSMENT — PAIN DESCRIPTION - DESCRIPTORS: DESCRIPTORS: STABBING

## 2017-07-12 ASSESSMENT — PAIN DESCRIPTION - PROGRESSION: CLINICAL_PROGRESSION: NOT CHANGED

## 2017-07-12 ASSESSMENT — PAIN DESCRIPTION - ONSET: ONSET: ON-GOING

## 2017-07-12 ASSESSMENT — PAIN DESCRIPTION - ORIENTATION: ORIENTATION: MID

## 2017-07-12 ASSESSMENT — PAIN DESCRIPTION - FREQUENCY: FREQUENCY: CONTINUOUS

## 2017-07-12 ASSESSMENT — PAIN SCALES - GENERAL: PAINLEVEL_OUTOF10: 3

## 2017-07-12 ASSESSMENT — PAIN DESCRIPTION - PAIN TYPE: TYPE: CHRONIC PAIN

## 2017-07-17 LAB
A/G RATIO: 0.9 (ref 1.1–2.2)
ALBUMIN SERPL-MCNC: 3.5 G/DL (ref 3.4–5)
ALP BLD-CCNC: 124 U/L (ref 40–129)
ALT SERPL-CCNC: 14 U/L (ref 10–40)
ANION GAP SERPL CALCULATED.3IONS-SCNC: 15 MMOL/L (ref 3–16)
AST SERPL-CCNC: 17 U/L (ref 15–37)
BASOPHILS ABSOLUTE: 0.1 K/UL (ref 0–0.2)
BASOPHILS RELATIVE PERCENT: 0.8 %
BILIRUB SERPL-MCNC: <0.2 MG/DL (ref 0–1)
BUN BLDV-MCNC: 34 MG/DL (ref 7–20)
C-REACTIVE PROTEIN: 48.8 MG/L (ref 0–5.1)
CALCIUM SERPL-MCNC: 9 MG/DL (ref 8.3–10.6)
CHLORIDE BLD-SCNC: 92 MMOL/L (ref 99–110)
CO2: 27 MMOL/L (ref 21–32)
CREAT SERPL-MCNC: 0.6 MG/DL (ref 0.9–1.3)
EOSINOPHILS ABSOLUTE: 0.4 K/UL (ref 0–0.6)
EOSINOPHILS RELATIVE PERCENT: 4.5 %
GFR AFRICAN AMERICAN: >60
GFR NON-AFRICAN AMERICAN: >60
GLOBULIN: 3.9 G/DL
GLUCOSE BLD-MCNC: 204 MG/DL (ref 70–99)
HCT VFR BLD CALC: 37.8 % (ref 40.5–52.5)
HEMOGLOBIN: 12.7 G/DL (ref 13.5–17.5)
LYMPHOCYTES ABSOLUTE: 1.9 K/UL (ref 1–5.1)
LYMPHOCYTES RELATIVE PERCENT: 23 %
MCH RBC QN AUTO: 27.5 PG (ref 26–34)
MCHC RBC AUTO-ENTMCNC: 33.7 G/DL (ref 31–36)
MCV RBC AUTO: 81.6 FL (ref 80–100)
MONOCYTES ABSOLUTE: 0.6 K/UL (ref 0–1.3)
MONOCYTES RELATIVE PERCENT: 7.5 %
NEUTROPHILS ABSOLUTE: 5.4 K/UL (ref 1.7–7.7)
NEUTROPHILS RELATIVE PERCENT: 64.2 %
PDW BLD-RTO: 15.7 % (ref 12.4–15.4)
PLATELET # BLD: 243 K/UL (ref 135–450)
PMV BLD AUTO: 10.5 FL (ref 5–10.5)
POTASSIUM SERPL-SCNC: 3.7 MMOL/L (ref 3.5–5.1)
RBC # BLD: 4.63 M/UL (ref 4.2–5.9)
SEDIMENTATION RATE, ERYTHROCYTE: 78 MM/HR (ref 0–15)
SODIUM BLD-SCNC: 134 MMOL/L (ref 136–145)
TOTAL CK: 52 U/L (ref 39–308)
TOTAL PROTEIN: 7.4 G/DL (ref 6.4–8.2)
WBC # BLD: 8.5 K/UL (ref 4–11)

## 2017-07-19 ENCOUNTER — HOSPITAL ENCOUNTER (OUTPATIENT)
Dept: WOUND CARE | Age: 52
Discharge: HOME | End: 2018-05-20
Attending: INTERNAL MEDICINE | Admitting: INTERNAL MEDICINE

## 2017-07-21 ENCOUNTER — HOSPITAL ENCOUNTER (OUTPATIENT)
Dept: WOUND CARE | Age: 52
Discharge: HOME | End: 2017-07-21
Attending: INTERNAL MEDICINE | Admitting: INTERNAL MEDICINE

## 2017-07-21 VITALS
HEART RATE: 75 BPM | DIASTOLIC BLOOD PRESSURE: 67 MMHG | RESPIRATION RATE: 16 BRPM | TEMPERATURE: 97.8 F | SYSTOLIC BLOOD PRESSURE: 107 MMHG | HEIGHT: 74 IN

## 2017-07-21 DIAGNOSIS — M86.672 OTHER CHRONIC OSTEOMYELITIS, LEFT ANKLE AND FOOT (HCC): ICD-10-CM

## 2017-07-21 PROCEDURE — 17250 CHEM CAUT OF GRANLTJ TISSUE: CPT | Performed by: INTERNAL MEDICINE

## 2017-07-21 PROCEDURE — 11043 DBRDMT MUSC&/FSCA 1ST 20/<: CPT | Performed by: INTERNAL MEDICINE

## 2017-07-21 PROCEDURE — 97597 DBRDMT OPN WND 1ST 20 CM/<: CPT | Performed by: INTERNAL MEDICINE

## 2017-07-21 PROCEDURE — 97605 NEG PRS WND THER DME<=50SQCM: CPT | Performed by: INTERNAL MEDICINE

## 2017-07-21 ASSESSMENT — PAIN DESCRIPTION - PROGRESSION: CLINICAL_PROGRESSION: NOT CHANGED

## 2017-07-21 ASSESSMENT — PAIN DESCRIPTION - PAIN TYPE: TYPE: CHRONIC PAIN

## 2017-07-21 ASSESSMENT — PAIN DESCRIPTION - LOCATION: LOCATION: NECK

## 2017-07-21 ASSESSMENT — PAIN SCALES - GENERAL: PAINLEVEL_OUTOF10: 4

## 2017-07-21 ASSESSMENT — PAIN DESCRIPTION - DESCRIPTORS: DESCRIPTORS: BURNING;STABBING

## 2017-07-21 ASSESSMENT — PAIN DESCRIPTION - FREQUENCY: FREQUENCY: CONTINUOUS

## 2017-07-21 ASSESSMENT — PAIN DESCRIPTION - ONSET: ONSET: ON-GOING

## 2017-07-26 ENCOUNTER — HOSPITAL ENCOUNTER (OUTPATIENT)
Dept: WOUND CARE | Age: 52
Discharge: HOME | End: 2017-07-26
Attending: INTERNAL MEDICINE | Admitting: INTERNAL MEDICINE

## 2017-07-26 VITALS
HEIGHT: 74 IN | HEART RATE: 68 BPM | RESPIRATION RATE: 16 BRPM | TEMPERATURE: 97.5 F | DIASTOLIC BLOOD PRESSURE: 75 MMHG | SYSTOLIC BLOOD PRESSURE: 119 MMHG | BODY MASS INDEX: 34.44 KG/M2 | WEIGHT: 268.4 LBS

## 2017-07-26 PROCEDURE — 15271 SKIN SUB GRAFT TRNK/ARM/LEG: CPT | Performed by: INTERNAL MEDICINE

## 2017-07-26 PROCEDURE — 17250 CHEM CAUT OF GRANLTJ TISSUE: CPT | Performed by: INTERNAL MEDICINE

## 2017-07-26 PROCEDURE — 11043 DBRDMT MUSC&/FSCA 1ST 20/<: CPT | Performed by: INTERNAL MEDICINE

## 2017-07-29 PROBLEM — M86.672 OTHER CHRONIC OSTEOMYELITIS, LEFT ANKLE AND FOOT (HCC): Status: ACTIVE | Noted: 2017-05-30

## 2017-07-29 PROBLEM — M86.672 OTHER CHRONIC OSTEOMYELITIS, LEFT ANKLE AND FOOT (HCC): Status: RESOLVED | Noted: 2017-05-30 | Resolved: 2017-07-29

## 2017-07-31 LAB
A/G RATIO: 0.9 (ref 1.1–2.2)
ALBUMIN SERPL-MCNC: 3.5 G/DL (ref 3.4–5)
ALP BLD-CCNC: 103 U/L (ref 40–129)
ALT SERPL-CCNC: 14 U/L (ref 10–40)
ANION GAP SERPL CALCULATED.3IONS-SCNC: 16 MMOL/L (ref 3–16)
AST SERPL-CCNC: 14 U/L (ref 15–37)
BASOPHILS ABSOLUTE: 0.1 K/UL (ref 0–0.2)
BASOPHILS RELATIVE PERCENT: 0.7 %
BILIRUB SERPL-MCNC: <0.2 MG/DL (ref 0–1)
BUN BLDV-MCNC: 25 MG/DL (ref 7–20)
C-REACTIVE PROTEIN: 50.1 MG/L (ref 0–5.1)
CALCIUM SERPL-MCNC: 9.3 MG/DL (ref 8.3–10.6)
CHLORIDE BLD-SCNC: 95 MMOL/L (ref 99–110)
CO2: 25 MMOL/L (ref 21–32)
CREAT SERPL-MCNC: 0.7 MG/DL (ref 0.9–1.3)
EOSINOPHILS ABSOLUTE: 0.2 K/UL (ref 0–0.6)
EOSINOPHILS RELATIVE PERCENT: 2.2 %
GFR AFRICAN AMERICAN: >60
GFR NON-AFRICAN AMERICAN: >60
GLOBULIN: 4 G/DL
GLUCOSE BLD-MCNC: 162 MG/DL (ref 70–99)
HCT VFR BLD CALC: 37.9 % (ref 40.5–52.5)
HEMOGLOBIN: 12.6 G/DL (ref 13.5–17.5)
LYMPHOCYTES ABSOLUTE: 1.6 K/UL (ref 1–5.1)
LYMPHOCYTES RELATIVE PERCENT: 16.1 %
MCH RBC QN AUTO: 26.7 PG (ref 26–34)
MCHC RBC AUTO-ENTMCNC: 33.3 G/DL (ref 31–36)
MCV RBC AUTO: 80.3 FL (ref 80–100)
MONOCYTES ABSOLUTE: 0.6 K/UL (ref 0–1.3)
MONOCYTES RELATIVE PERCENT: 6.1 %
NEUTROPHILS ABSOLUTE: 7.5 K/UL (ref 1.7–7.7)
NEUTROPHILS RELATIVE PERCENT: 74.9 %
PDW BLD-RTO: 16.1 % (ref 12.4–15.4)
PLATELET # BLD: 283 K/UL (ref 135–450)
PMV BLD AUTO: 9.7 FL (ref 5–10.5)
POTASSIUM SERPL-SCNC: 3.8 MMOL/L (ref 3.5–5.1)
RBC # BLD: 4.72 M/UL (ref 4.2–5.9)
SEDIMENTATION RATE, ERYTHROCYTE: 90 MM/HR (ref 0–15)
SODIUM BLD-SCNC: 136 MMOL/L (ref 136–145)
TOTAL CK: 44 U/L (ref 39–308)
TOTAL PROTEIN: 7.5 G/DL (ref 6.4–8.2)
WBC # BLD: 10 K/UL (ref 4–11)

## 2017-08-01 PROBLEM — M86.672 OTHER CHRONIC OSTEOMYELITIS, LEFT ANKLE AND FOOT (HCC): Status: RESOLVED | Noted: 2017-05-30 | Resolved: 2017-08-01

## 2017-08-01 RX ORDER — CEFDINIR 300 MG/1
300 CAPSULE ORAL 2 TIMES DAILY
COMMUNITY
End: 2017-09-04

## 2017-08-02 ENCOUNTER — HOSPITAL ENCOUNTER (OUTPATIENT)
Dept: WOUND CARE | Age: 52
Discharge: HOME | End: 2017-08-02
Attending: INTERNAL MEDICINE | Admitting: INTERNAL MEDICINE

## 2017-08-02 VITALS
BODY MASS INDEX: 34.72 KG/M2 | DIASTOLIC BLOOD PRESSURE: 72 MMHG | WEIGHT: 270.5 LBS | HEART RATE: 71 BPM | TEMPERATURE: 97.6 F | SYSTOLIC BLOOD PRESSURE: 126 MMHG | RESPIRATION RATE: 17 BRPM | HEIGHT: 74 IN

## 2017-08-02 PROCEDURE — 97605 NEG PRS WND THER DME<=50SQCM: CPT | Performed by: INTERNAL MEDICINE

## 2017-08-02 PROCEDURE — 97597 DBRDMT OPN WND 1ST 20 CM/<: CPT | Performed by: INTERNAL MEDICINE

## 2017-08-02 PROCEDURE — 15271 SKIN SUB GRAFT TRNK/ARM/LEG: CPT | Performed by: INTERNAL MEDICINE

## 2017-08-09 ENCOUNTER — HOSPITAL ENCOUNTER (OUTPATIENT)
Dept: WOUND CARE | Age: 52
Discharge: HOME | End: 2017-08-09
Attending: INTERNAL MEDICINE | Admitting: INTERNAL MEDICINE

## 2017-08-09 VITALS
HEART RATE: 84 BPM | WEIGHT: 263 LBS | SYSTOLIC BLOOD PRESSURE: 139 MMHG | HEIGHT: 74 IN | DIASTOLIC BLOOD PRESSURE: 83 MMHG | BODY MASS INDEX: 33.75 KG/M2 | RESPIRATION RATE: 18 BRPM | TEMPERATURE: 98.5 F

## 2017-08-09 PROCEDURE — 15271 SKIN SUB GRAFT TRNK/ARM/LEG: CPT | Performed by: INTERNAL MEDICINE

## 2017-08-09 ASSESSMENT — PAIN DESCRIPTION - LOCATION: LOCATION: NECK

## 2017-08-09 ASSESSMENT — PAIN DESCRIPTION - ORIENTATION: ORIENTATION: MID

## 2017-08-09 ASSESSMENT — PAIN DESCRIPTION - PROGRESSION: CLINICAL_PROGRESSION: NOT CHANGED

## 2017-08-09 ASSESSMENT — PAIN SCALES - GENERAL: PAINLEVEL_OUTOF10: 3

## 2017-08-09 ASSESSMENT — PAIN DESCRIPTION - FREQUENCY: FREQUENCY: CONTINUOUS

## 2017-08-09 ASSESSMENT — PAIN DESCRIPTION - PAIN TYPE: TYPE: CHRONIC PAIN

## 2017-08-09 ASSESSMENT — PAIN DESCRIPTION - DESCRIPTORS: DESCRIPTORS: ACHING

## 2017-08-09 ASSESSMENT — PAIN DESCRIPTION - ONSET: ONSET: ON-GOING

## 2017-08-13 PROBLEM — L97.523 DIABETIC ULCER OF LEFT FOOT WITH NECROSIS OF MUSCLE (HCC): Status: RESOLVED | Noted: 2017-02-13 | Resolved: 2017-08-13

## 2017-08-13 PROBLEM — E11.621 DIABETIC ULCER OF LEFT FOOT WITH NECROSIS OF MUSCLE (HCC): Status: RESOLVED | Noted: 2017-02-13 | Resolved: 2017-08-13

## 2017-08-14 RX ORDER — DOXYCYCLINE HYCLATE 100 MG
100 TABLET ORAL 2 TIMES DAILY
Qty: 28 TABLET | Refills: 0 | Status: SHIPPED | OUTPATIENT
Start: 2017-08-14 | End: 2017-08-28

## 2017-08-16 ENCOUNTER — HOSPITAL ENCOUNTER (OUTPATIENT)
Dept: WOUND CARE | Age: 52
Discharge: HOME | End: 2017-08-16
Attending: INTERNAL MEDICINE | Admitting: INTERNAL MEDICINE

## 2017-08-16 VITALS
SYSTOLIC BLOOD PRESSURE: 115 MMHG | RESPIRATION RATE: 17 BRPM | HEIGHT: 74 IN | WEIGHT: 274.4 LBS | TEMPERATURE: 98.6 F | DIASTOLIC BLOOD PRESSURE: 77 MMHG | HEART RATE: 82 BPM | BODY MASS INDEX: 35.22 KG/M2

## 2017-08-16 PROCEDURE — 15271 SKIN SUB GRAFT TRNK/ARM/LEG: CPT | Performed by: INTERNAL MEDICINE

## 2017-08-21 PROBLEM — L89.103 DECUBITUS ULCER OF UPPER BACK, STAGE 3 (HCC): Status: ACTIVE | Noted: 2017-08-21

## 2017-08-23 ENCOUNTER — HOSPITAL ENCOUNTER (OUTPATIENT)
Dept: WOUND CARE | Age: 52
Discharge: HOME | End: 2017-08-23
Attending: INTERNAL MEDICINE | Admitting: INTERNAL MEDICINE

## 2017-08-23 VITALS
DIASTOLIC BLOOD PRESSURE: 79 MMHG | TEMPERATURE: 97.2 F | HEIGHT: 74 IN | SYSTOLIC BLOOD PRESSURE: 115 MMHG | WEIGHT: 283 LBS | HEART RATE: 85 BPM | RESPIRATION RATE: 18 BRPM | BODY MASS INDEX: 36.32 KG/M2

## 2017-08-23 PROCEDURE — 11042 DBRDMT SUBQ TIS 1ST 20SQCM/<: CPT | Performed by: INTERNAL MEDICINE

## 2017-08-23 PROCEDURE — 99214 OFFICE O/P EST MOD 30 MIN: CPT | Performed by: INTERNAL MEDICINE

## 2017-08-23 PROCEDURE — 15271 SKIN SUB GRAFT TRNK/ARM/LEG: CPT | Performed by: INTERNAL MEDICINE

## 2017-08-23 PROCEDURE — 97597 DBRDMT OPN WND 1ST 20 CM/<: CPT | Performed by: INTERNAL MEDICINE

## 2017-08-23 RX ORDER — CLOPIDOGREL BISULFATE 75 MG/1
TABLET ORAL
Qty: 90 TABLET | Refills: 1 | Status: ON HOLD | OUTPATIENT
Start: 2017-08-23 | End: 2017-10-05 | Stop reason: HOSPADM

## 2017-08-23 ASSESSMENT — PAIN SCALES - GENERAL: PAINLEVEL_OUTOF10: 4

## 2017-08-23 ASSESSMENT — PAIN DESCRIPTION - FREQUENCY: FREQUENCY: CONTINUOUS

## 2017-08-23 ASSESSMENT — PAIN DESCRIPTION - LOCATION: LOCATION: NECK

## 2017-08-23 ASSESSMENT — PAIN DESCRIPTION - ONSET: ONSET: ON-GOING

## 2017-08-23 ASSESSMENT — PAIN DESCRIPTION - PROGRESSION: CLINICAL_PROGRESSION: NOT CHANGED

## 2017-08-23 ASSESSMENT — PAIN DESCRIPTION - ORIENTATION: ORIENTATION: MID

## 2017-08-23 ASSESSMENT — PAIN DESCRIPTION - PAIN TYPE: TYPE: CHRONIC PAIN

## 2017-08-23 ASSESSMENT — PAIN DESCRIPTION - DESCRIPTORS: DESCRIPTORS: ACHING

## 2017-08-25 LAB
GRAM STAIN RESULT: ABNORMAL
ORGANISM: ABNORMAL
ORGANISM: ABNORMAL
WOUND/ABSCESS: ABNORMAL

## 2017-08-30 ENCOUNTER — HOSPITAL ENCOUNTER (OUTPATIENT)
Dept: WOUND CARE | Age: 52
Discharge: HOME | End: 2017-08-30
Attending: INTERNAL MEDICINE | Admitting: INTERNAL MEDICINE

## 2017-08-30 VITALS
SYSTOLIC BLOOD PRESSURE: 103 MMHG | TEMPERATURE: 97 F | RESPIRATION RATE: 20 BRPM | HEART RATE: 80 BPM | DIASTOLIC BLOOD PRESSURE: 68 MMHG

## 2017-08-30 PROBLEM — L89.103 DECUBITUS ULCER OF UPPER BACK, STAGE 3 (HCC): Status: RESOLVED | Noted: 2017-08-21 | Resolved: 2017-08-30

## 2017-08-30 PROCEDURE — 15271 SKIN SUB GRAFT TRNK/ARM/LEG: CPT | Performed by: INTERNAL MEDICINE

## 2017-08-30 ASSESSMENT — PAIN DESCRIPTION - ORIENTATION: ORIENTATION: MID

## 2017-08-30 ASSESSMENT — PAIN DESCRIPTION - PAIN TYPE: TYPE: CHRONIC PAIN

## 2017-08-30 ASSESSMENT — PAIN DESCRIPTION - DESCRIPTORS: DESCRIPTORS: ACHING

## 2017-08-30 ASSESSMENT — PAIN DESCRIPTION - FREQUENCY: FREQUENCY: CONTINUOUS

## 2017-08-30 ASSESSMENT — PAIN DESCRIPTION - ONSET: ONSET: ON-GOING

## 2017-08-30 ASSESSMENT — PAIN DESCRIPTION - LOCATION: LOCATION: NECK

## 2017-08-30 ASSESSMENT — PAIN SCALES - GENERAL: PAINLEVEL_OUTOF10: 7

## 2017-08-30 ASSESSMENT — PAIN DESCRIPTION - PROGRESSION: CLINICAL_PROGRESSION: NOT CHANGED

## 2017-09-05 DIAGNOSIS — D63.8 ANEMIA OF CHRONIC DISORDER: ICD-10-CM

## 2017-09-06 ENCOUNTER — HOSPITAL ENCOUNTER (OUTPATIENT)
Dept: WOUND CARE | Age: 52
Discharge: HOME | End: 2017-09-06
Attending: INTERNAL MEDICINE | Admitting: INTERNAL MEDICINE

## 2017-09-06 VITALS
SYSTOLIC BLOOD PRESSURE: 125 MMHG | HEART RATE: 89 BPM | HEIGHT: 74 IN | RESPIRATION RATE: 17 BRPM | DIASTOLIC BLOOD PRESSURE: 82 MMHG | TEMPERATURE: 96.8 F

## 2017-09-06 DIAGNOSIS — T81.31XA DEHISCENCE OF SURGICAL WOUND, INITIAL ENCOUNTER: ICD-10-CM

## 2017-09-06 DIAGNOSIS — I87.2 PERIPHERAL VENOUS INSUFFICIENCY: ICD-10-CM

## 2017-09-06 DIAGNOSIS — T84.7XXA INFECTED HARDWARE IN LEFT LEG, INITIAL ENCOUNTER (HCC): ICD-10-CM

## 2017-09-06 DIAGNOSIS — I25.10 CORONARY ARTERY DISEASE INVOLVING NATIVE CORONARY ARTERY OF NATIVE HEART WITHOUT ANGINA PECTORIS: Primary | ICD-10-CM

## 2017-09-06 PROCEDURE — 97597 DBRDMT OPN WND 1ST 20 CM/<: CPT | Performed by: INTERNAL MEDICINE

## 2017-09-06 PROCEDURE — 29581 APPL MULTLAYER CMPRN SYS LEG: CPT | Performed by: INTERNAL MEDICINE

## 2017-09-06 PROCEDURE — 11042 DBRDMT SUBQ TIS 1ST 20SQCM/<: CPT | Performed by: INTERNAL MEDICINE

## 2017-09-06 ASSESSMENT — PAIN DESCRIPTION - ONSET: ONSET: ON-GOING

## 2017-09-06 ASSESSMENT — PAIN SCALES - GENERAL: PAINLEVEL_OUTOF10: 3

## 2017-09-06 ASSESSMENT — PAIN DESCRIPTION - PROGRESSION: CLINICAL_PROGRESSION: NOT CHANGED

## 2017-09-06 ASSESSMENT — PAIN DESCRIPTION - LOCATION: LOCATION: NECK

## 2017-09-06 ASSESSMENT — PAIN DESCRIPTION - FREQUENCY: FREQUENCY: CONTINUOUS

## 2017-09-06 ASSESSMENT — PAIN DESCRIPTION - DESCRIPTORS: DESCRIPTORS: ACHING

## 2017-09-06 ASSESSMENT — PAIN DESCRIPTION - PAIN TYPE: TYPE: CHRONIC PAIN

## 2017-09-06 ASSESSMENT — PAIN DESCRIPTION - ORIENTATION: ORIENTATION: MID

## 2017-09-08 RX ORDER — FERROUS SULFATE 325(65) MG
TABLET ORAL
Qty: 270 TABLET | Refills: 0 | Status: SHIPPED | OUTPATIENT
Start: 2017-09-08 | End: 2018-01-03 | Stop reason: SDUPTHER

## 2017-09-10 DIAGNOSIS — M62.838 MUSCLE SPASM: ICD-10-CM

## 2017-09-11 ENCOUNTER — TELEPHONE (OUTPATIENT)
Dept: FAMILY MEDICINE CLINIC | Age: 52
End: 2017-09-11

## 2017-09-11 PROBLEM — I87.2 PERIPHERAL VENOUS INSUFFICIENCY: Status: ACTIVE | Noted: 2017-09-11

## 2017-09-12 RX ORDER — SENNA-LAX 8.6 MG/1
TABLET ORAL
Qty: 180 TABLET | Refills: 4 | Status: SHIPPED | OUTPATIENT
Start: 2017-09-12 | End: 2019-03-19 | Stop reason: SDUPTHER

## 2017-09-13 ENCOUNTER — HOSPITAL ENCOUNTER (OUTPATIENT)
Dept: WOUND CARE | Age: 52
Discharge: HOME | End: 2017-09-13
Attending: INTERNAL MEDICINE | Admitting: INTERNAL MEDICINE

## 2017-09-13 VITALS
SYSTOLIC BLOOD PRESSURE: 115 MMHG | RESPIRATION RATE: 16 BRPM | TEMPERATURE: 97 F | HEART RATE: 90 BPM | HEIGHT: 74 IN | DIASTOLIC BLOOD PRESSURE: 70 MMHG

## 2017-09-13 DIAGNOSIS — I25.5 ISCHEMIC CARDIOMYOPATHY: ICD-10-CM

## 2017-09-13 PROCEDURE — 97597 DBRDMT OPN WND 1ST 20 CM/<: CPT | Performed by: INTERNAL MEDICINE

## 2017-09-13 PROCEDURE — 11042 DBRDMT SUBQ TIS 1ST 20SQCM/<: CPT | Performed by: INTERNAL MEDICINE

## 2017-09-13 ASSESSMENT — PAIN DESCRIPTION - ONSET: ONSET: ON-GOING

## 2017-09-13 ASSESSMENT — PAIN DESCRIPTION - DESCRIPTORS: DESCRIPTORS: BURNING

## 2017-09-13 ASSESSMENT — PAIN DESCRIPTION - ORIENTATION: ORIENTATION: MID

## 2017-09-13 ASSESSMENT — PAIN DESCRIPTION - PROGRESSION: CLINICAL_PROGRESSION: NOT CHANGED

## 2017-09-13 ASSESSMENT — PAIN DESCRIPTION - PAIN TYPE: TYPE: CHRONIC PAIN

## 2017-09-13 ASSESSMENT — PAIN DESCRIPTION - FREQUENCY: FREQUENCY: CONTINUOUS

## 2017-09-13 ASSESSMENT — PAIN SCALES - GENERAL: PAINLEVEL_OUTOF10: 3

## 2017-09-13 ASSESSMENT — PAIN DESCRIPTION - LOCATION: LOCATION: NECK

## 2017-09-14 ENCOUNTER — TELEPHONE (OUTPATIENT)
Dept: FAMILY MEDICINE CLINIC | Age: 52
End: 2017-09-14

## 2017-09-19 PROBLEM — I25.5 ISCHEMIC CARDIOMYOPATHY: Status: ACTIVE | Noted: 2017-09-19

## 2017-09-19 PROBLEM — L97.922 CHRONIC ULCER OF LEFT LOWER EXTREMITY WITH FAT LAYER EXPOSED (HCC): Status: ACTIVE | Noted: 2017-06-10

## 2017-09-20 ENCOUNTER — HOSPITAL ENCOUNTER (OUTPATIENT)
Dept: WOUND CARE | Age: 52
Discharge: HOME | End: 2017-09-20
Attending: INTERNAL MEDICINE | Admitting: INTERNAL MEDICINE

## 2017-09-20 VITALS
RESPIRATION RATE: 18 BRPM | HEART RATE: 92 BPM | DIASTOLIC BLOOD PRESSURE: 78 MMHG | TEMPERATURE: 97.1 F | HEIGHT: 74 IN | SYSTOLIC BLOOD PRESSURE: 123 MMHG

## 2017-09-20 LAB
GLUCOSE BLD-MCNC: 139 MG/DL (ref 70–99)
PERFORMED ON: ABNORMAL

## 2017-09-20 PROCEDURE — 15271 SKIN SUB GRAFT TRNK/ARM/LEG: CPT | Performed by: INTERNAL MEDICINE

## 2017-09-20 PROCEDURE — 97597 DBRDMT OPN WND 1ST 20 CM/<: CPT | Performed by: INTERNAL MEDICINE

## 2017-09-20 ASSESSMENT — PAIN DESCRIPTION - PAIN TYPE: TYPE: CHRONIC PAIN

## 2017-09-20 ASSESSMENT — PAIN DESCRIPTION - DESCRIPTORS: DESCRIPTORS: BURNING

## 2017-09-20 ASSESSMENT — PAIN DESCRIPTION - ORIENTATION: ORIENTATION: MID

## 2017-09-20 ASSESSMENT — PAIN DESCRIPTION - FREQUENCY: FREQUENCY: CONTINUOUS

## 2017-09-20 ASSESSMENT — PAIN DESCRIPTION - LOCATION: LOCATION: NECK

## 2017-09-20 ASSESSMENT — PAIN DESCRIPTION - PROGRESSION: CLINICAL_PROGRESSION: NOT CHANGED

## 2017-09-20 ASSESSMENT — PAIN SCALES - GENERAL: PAINLEVEL_OUTOF10: 3

## 2017-09-20 ASSESSMENT — PAIN DESCRIPTION - ONSET: ONSET: ON-GOING

## 2017-09-21 DIAGNOSIS — J30.9 ALLERGIC RHINITIS: ICD-10-CM

## 2017-09-21 RX ORDER — LORATADINE 10 MG/1
TABLET ORAL
Qty: 90 TABLET | Refills: 0 | Status: ON HOLD | OUTPATIENT
Start: 2017-09-21 | End: 2018-01-11 | Stop reason: HOSPADM

## 2017-09-27 ENCOUNTER — HOSPITAL ENCOUNTER (OUTPATIENT)
Dept: WOUND CARE | Age: 52
Discharge: HOME | End: 2017-09-27
Attending: INTERNAL MEDICINE | Admitting: INTERNAL MEDICINE

## 2017-09-27 VITALS
RESPIRATION RATE: 22 BRPM | SYSTOLIC BLOOD PRESSURE: 127 MMHG | DIASTOLIC BLOOD PRESSURE: 80 MMHG | TEMPERATURE: 99.3 F | OXYGEN SATURATION: 96 % | HEART RATE: 95 BPM

## 2017-09-27 PROBLEM — I50.9 ACUTE CHF (CONGESTIVE HEART FAILURE) (HCC): Status: ACTIVE | Noted: 2017-09-27

## 2017-09-27 LAB
ANION GAP SERPL CALCULATED.3IONS-SCNC: 16 MMOL/L (ref 3–16)
BUN BLDV-MCNC: 22 MG/DL (ref 7–20)
CALCIUM SERPL-MCNC: 8.6 MG/DL (ref 8.3–10.6)
CHLORIDE BLD-SCNC: 96 MMOL/L (ref 99–110)
CO2: 24 MMOL/L (ref 21–32)
CREAT SERPL-MCNC: 0.8 MG/DL (ref 0.9–1.3)
GFR AFRICAN AMERICAN: >60
GFR NON-AFRICAN AMERICAN: >60
GLUCOSE BLD-MCNC: 104 MG/DL (ref 70–99)
GLUCOSE BLD-MCNC: 135 MG/DL (ref 70–99)
PERFORMED ON: ABNORMAL
POTASSIUM SERPL-SCNC: 4.3 MMOL/L (ref 3.5–5.1)
PRO-BNP: ABNORMAL PG/ML (ref 0–124)
SODIUM BLD-SCNC: 136 MMOL/L (ref 136–145)

## 2017-09-27 PROCEDURE — 97597 DBRDMT OPN WND 1ST 20 CM/<: CPT | Performed by: INTERNAL MEDICINE

## 2017-09-27 PROCEDURE — 99215 OFFICE O/P EST HI 40 MIN: CPT | Performed by: INTERNAL MEDICINE

## 2017-09-27 PROCEDURE — 11042 DBRDMT SUBQ TIS 1ST 20SQCM/<: CPT | Performed by: INTERNAL MEDICINE

## 2017-09-27 PROCEDURE — 11045 DBRDMT SUBQ TISS EACH ADDL: CPT | Performed by: INTERNAL MEDICINE

## 2017-09-27 ASSESSMENT — PAIN DESCRIPTION - FREQUENCY
FREQUENCY: CONTINUOUS
FREQUENCY: CONTINUOUS

## 2017-09-27 ASSESSMENT — PAIN DESCRIPTION - DESCRIPTORS
DESCRIPTORS: ACHING;DULL
DESCRIPTORS: BURNING

## 2017-09-27 ASSESSMENT — PAIN SCALES - GENERAL
PAINLEVEL_OUTOF10: 3
PAINLEVEL_OUTOF10: 2

## 2017-09-27 ASSESSMENT — PAIN DESCRIPTION - PAIN TYPE
TYPE: ACUTE PAIN
TYPE: CHRONIC PAIN

## 2017-09-27 ASSESSMENT — PAIN DESCRIPTION - LOCATION
LOCATION: NECK
LOCATION: HEAD

## 2017-09-27 ASSESSMENT — PAIN DESCRIPTION - ORIENTATION
ORIENTATION: MID
ORIENTATION: ANTERIOR

## 2017-09-27 ASSESSMENT — PAIN DESCRIPTION - ONSET
ONSET: ON-GOING
ONSET: ON-GOING

## 2017-09-27 ASSESSMENT — PAIN DESCRIPTION - PROGRESSION
CLINICAL_PROGRESSION: NOT CHANGED
CLINICAL_PROGRESSION: NOT CHANGED

## 2017-09-28 PROBLEM — I21.4 NSTEMI (NON-ST ELEVATED MYOCARDIAL INFARCTION) (HCC): Status: ACTIVE | Noted: 2017-09-28

## 2017-10-02 PROBLEM — I50.9 ACUTE CHF (CONGESTIVE HEART FAILURE) (HCC): Status: RESOLVED | Noted: 2017-09-27 | Resolved: 2017-10-02

## 2017-10-03 PROCEDURE — G0179 MD RECERTIFICATION HHA PT: HCPCS | Performed by: INTERNAL MEDICINE

## 2017-10-06 ENCOUNTER — CARE COORDINATION (OUTPATIENT)
Dept: CASE MANAGEMENT | Age: 52
End: 2017-10-06

## 2017-10-06 PROBLEM — I50.33 ACUTE ON CHRONIC DIASTOLIC CHF (CONGESTIVE HEART FAILURE) (HCC): Status: ACTIVE | Noted: 2017-10-06

## 2017-10-09 ENCOUNTER — PROCEDURE VISIT (OUTPATIENT)
Dept: CARDIOLOGY CLINIC | Age: 52
End: 2017-10-09

## 2017-10-09 DIAGNOSIS — Z45.09 ENCOUNTER FOR ELECTRONIC ANALYSIS OF REVEAL EVENT RECORDER: Primary | ICD-10-CM

## 2017-10-18 ENCOUNTER — HOSPITAL ENCOUNTER (OUTPATIENT)
Dept: WOUND CARE | Age: 52
Discharge: HOME | End: 2017-10-18
Attending: INTERNAL MEDICINE | Admitting: INTERNAL MEDICINE

## 2017-10-18 VITALS
SYSTOLIC BLOOD PRESSURE: 85 MMHG | DIASTOLIC BLOOD PRESSURE: 54 MMHG | RESPIRATION RATE: 18 BRPM | HEART RATE: 86 BPM | TEMPERATURE: 97.8 F

## 2017-10-18 PROCEDURE — 11720 DEBRIDE NAIL 1-5: CPT | Performed by: INTERNAL MEDICINE

## 2017-10-18 PROCEDURE — 11045 DBRDMT SUBQ TISS EACH ADDL: CPT | Performed by: INTERNAL MEDICINE

## 2017-10-18 PROCEDURE — 17250 CHEM CAUT OF GRANLTJ TISSUE: CPT | Performed by: INTERNAL MEDICINE

## 2017-10-18 PROCEDURE — 11042 DBRDMT SUBQ TIS 1ST 20SQCM/<: CPT | Performed by: INTERNAL MEDICINE

## 2017-10-18 RX ORDER — PRAVASTATIN SODIUM 80 MG/1
80 TABLET ORAL DAILY
COMMUNITY
End: 2018-01-24 | Stop reason: ALTCHOICE

## 2017-10-25 ENCOUNTER — HOSPITAL ENCOUNTER (OUTPATIENT)
Dept: WOUND CARE | Age: 52
Discharge: HOME | End: 2017-10-25
Attending: INTERNAL MEDICINE | Admitting: INTERNAL MEDICINE

## 2017-10-25 VITALS
HEART RATE: 93 BPM | HEIGHT: 74 IN | DIASTOLIC BLOOD PRESSURE: 60 MMHG | TEMPERATURE: 97 F | SYSTOLIC BLOOD PRESSURE: 97 MMHG

## 2017-10-25 DIAGNOSIS — L89.892 DECUBITUS ULCER OF RIGHT FOOT, STAGE 2 (HCC): ICD-10-CM

## 2017-10-25 LAB
ANION GAP SERPL CALCULATED.3IONS-SCNC: 14 MMOL/L (ref 3–16)
BUN BLDV-MCNC: 27 MG/DL (ref 7–20)
CALCIUM SERPL-MCNC: 8.8 MG/DL (ref 8.3–10.6)
CHLORIDE BLD-SCNC: 97 MMOL/L (ref 99–110)
CO2: 23 MMOL/L (ref 21–32)
CREAT SERPL-MCNC: 1 MG/DL (ref 0.9–1.3)
GFR AFRICAN AMERICAN: >60
GFR NON-AFRICAN AMERICAN: >60
GLUCOSE BLD-MCNC: 185 MG/DL (ref 70–99)
MAGNESIUM: 1.6 MG/DL (ref 1.8–2.4)
POTASSIUM SERPL-SCNC: 5 MMOL/L (ref 3.5–5.1)
SODIUM BLD-SCNC: 134 MMOL/L (ref 136–145)

## 2017-10-25 PROCEDURE — 97597 DBRDMT OPN WND 1ST 20 CM/<: CPT | Performed by: INTERNAL MEDICINE

## 2017-10-25 PROCEDURE — 17250 CHEM CAUT OF GRANLTJ TISSUE: CPT | Performed by: INTERNAL MEDICINE

## 2017-10-25 RX ORDER — SODIUM POLYSTYRENE SULFONATE 15 G/60ML
30 SUSPENSION ORAL; RECTAL ONCE
COMMUNITY
End: 2017-12-05

## 2017-10-25 ASSESSMENT — PAIN DESCRIPTION - ONSET: ONSET: ON-GOING

## 2017-10-25 ASSESSMENT — PAIN DESCRIPTION - ORIENTATION: ORIENTATION: LEFT

## 2017-10-25 ASSESSMENT — PAIN DESCRIPTION - PAIN TYPE: TYPE: CHRONIC PAIN

## 2017-10-25 ASSESSMENT — PAIN DESCRIPTION - LOCATION: LOCATION: NECK;SHOULDER

## 2017-10-25 ASSESSMENT — PAIN DESCRIPTION - FREQUENCY: FREQUENCY: CONTINUOUS

## 2017-10-25 ASSESSMENT — PAIN DESCRIPTION - PROGRESSION: CLINICAL_PROGRESSION: NOT CHANGED

## 2017-10-25 ASSESSMENT — PAIN SCALES - WONG BAKER: WONGBAKER_NUMERICALRESPONSE: 0

## 2017-10-25 ASSESSMENT — PAIN DESCRIPTION - DESCRIPTORS: DESCRIPTORS: BURNING;CONSTANT

## 2017-10-25 ASSESSMENT — PAIN SCALES - GENERAL: PAINLEVEL_OUTOF10: 3

## 2017-10-25 NOTE — PLAN OF CARE
Problem: Wound:  Goal: Will show signs of wound healing; wound closure and no evidence of infection  Will show signs of wound healing; wound closure and no evidence of infection   Outcome: Ongoing  New wound to right lateral foot, Ag Nitrate used per Dr. Liudmila De, will use AgAlginate to wound & change to using single medium tubigrip under CircAid for leg & stop using the CircAid sock. Right Hip wound stable. Right Ischial wound with alin-wound denuded this week & measuring larger. Will cont. With Ag Alginate & change dressings daily. Reinforced importance of frequent repositioning from side to side & not scooting. Also back showing signs of more pressure. Dr. Liudmila De ordered to start Dakins, then Ag Alginate, change once daily. Dr. Liudmila De is also going to attempt to place foam padding to the back of his motorized chair to help pad the back wound. Left lateral foot measuring deeper & left lateral leg also measuring larger. Ag Nitrate used to leg wound per Dr. Liudmila De. Will cont. With Ag Alginate & add Santyl to leg wound, change daily. Will cont. With Double medium tubigrip for edema control on left leg. Left heel stable, change to using betadine & dry dressing, change daily. Pt. Cont. To use Prevlon boots for off-loading. F/u in [facility] in 1 week as ordered. Discharge instructions reviewed with patient, all questions answered, copy given to patient. Dressings were applied to all wounds per M.D. Instructions at this visit.

## 2017-10-26 PROBLEM — I50.9 ACUTE ON CHRONIC CONGESTIVE HEART FAILURE (HCC): Status: RESOLVED | Noted: 2017-09-27 | Resolved: 2017-10-26

## 2017-10-26 PROBLEM — I21.4 NSTEMI (NON-ST ELEVATED MYOCARDIAL INFARCTION) (HCC): Status: RESOLVED | Noted: 2017-09-28 | Resolved: 2017-10-26

## 2017-10-26 PROBLEM — I50.33 ACUTE ON CHRONIC DIASTOLIC CHF (CONGESTIVE HEART FAILURE) (HCC): Status: RESOLVED | Noted: 2017-10-06 | Resolved: 2017-10-26

## 2017-10-26 RX ORDER — DOXYCYCLINE HYCLATE 100 MG
100 TABLET ORAL 2 TIMES DAILY
Qty: 28 TABLET | Refills: 3 | Status: SHIPPED | OUTPATIENT
Start: 2017-10-26 | End: 2017-11-01 | Stop reason: ALTCHOICE

## 2017-10-26 RX ORDER — AMOXICILLIN AND CLAVULANATE POTASSIUM 875; 125 MG/1; MG/1
1 TABLET, FILM COATED ORAL 2 TIMES DAILY
Qty: 28 TABLET | Refills: 3 | Status: SHIPPED | OUTPATIENT
Start: 2017-10-26 | End: 2017-11-01 | Stop reason: ALTCHOICE

## 2017-10-26 NOTE — PROGRESS NOTES
toes in two locations. Chetna-ulcer skin: mostly pink and indurated, mild ecchymosis at ischium and left leg, mild maceration at left leg. Ulcer(s): spinal wound slowly larger at the surface (with undermining less), not a large amount of necrosis, moderate serous exudate, no malodor, more hardware visible than a couple of weeks ago; right ischial ulcer smaller, red, clean, hypergranular; right hip sinus tract stable; left heel purple-dull red DTI, not fluctuant, no mature eschar, no open areas; left leg ulcer larger, less granulation, a good amount of SQ fibronecrosis, debris, presumed biofilm, focally exposed healthy muscle; left foot granular, small, doesn't seem to probe as deeply as before. Pre-debridement wound measurements are in the wound documentation flowsheet. Photos also saved in electronic chart. 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 7XXA    Iron deficiency anemia D50.9    Lymphedema of both lower extremities I89.0    Neurogenic bladder N31.9    Neurogenic bowel K59.2    Decubitus ulcer of right ischium, stage 4 ([facility]) L89.314    Hypergranulation L92.9    Dehiscence of surgical wound of T-spine T81.31XA    Chronic ulcer of left lower extremity with fat layer exposed ([address] J42.928    Onychomycosis B35.1    Dystrophic nail L60.3    Diabetic ulcer of left midfoot associated with type 2 diabetes mellitus, with necrosis of muscle ([facility]) lidocaine topical solution. This was tolerated well, with no pain or skin injury. _____________    I also used a sterile nail nipper to debride just a few of his dystrophic and onychomycotic nails, to prevent injury to adjacent soft tissues -- tolerated well, with no skin injury or bleeding. Discharge plan:     Treatment in the wound care center today: Pressure Ulcer 10/10/17 Heel Left;Posterior #28 Left posterior heel Deep Tissue Injury - recurrent-Dressing/Treatment:  (tegaderm heeling dressing, aquaphor)  Wound 08/16/17 #27- Thoracic spine, dehisced surgical wound, full thickness, onset 8/16/2017, pressure-Dressing/Treatment: Protective barrier, Silicone dressing, Silver dressing  Pressure Ulcer 09/18/14 #7, right hip, stage 4 PU, onset 8/7/14-Dressing/Treatment: Protective barrier, Silicone dressing, Silver dressing  Wound 10/17/14  #8, right ischium, stage 4 PU, onset 7/15/14-Dressing/Treatment: Protective barrier, Silicone dressing, Silver dressing  Wound 06/07/17 #24 Left lateral lower leg-cluster, Venous, full thickness,  (onset  6/5/17) unknown-Dressing/Treatment: ABD, Protective barrier, Silver dressing (kerlix, f- tubi dbl layer)  Wound 02/13/17 #22 left lateral foot, Mccauley 3 DFU, onset 2/2017, surgical dehiscence-Dressing/Treatment: ABD, Protective barrier, Silicone dressing, Silver dressing (kerlix, f- double layer tubi). I reminded the patient of the importance of weight management and smoking cessation, if applicable; also encouraged ambulation as tolerated, additional lower extremity exercises as instructed in our education sheet, leg elevation when at rest, and compliance with any recommended dietary, diuretic and compression therapies. CircAid to the RLE, Tubigrips to LLE. No Abx for now. I'll update Dr. Mable Bee on his condition, and see if he wants to discuss OR timing with cardiology. Keep working on protein intake, glucose control, offloading, limited fluid and sodium intake.

## 2017-10-30 ENCOUNTER — HOSPITAL ENCOUNTER (OUTPATIENT)
Dept: OTHER | Age: 52
Discharge: HOME | End: 2017-10-31

## 2017-10-30 LAB — POTASSIUM SERPL-SCNC: 5.5 MMOL/L (ref 3.5–5.1)

## 2017-11-01 ENCOUNTER — HOSPITAL ENCOUNTER (OUTPATIENT)
Dept: WOUND CARE | Age: 52
Discharge: HOME | End: 2017-11-01
Attending: INTERNAL MEDICINE | Admitting: INTERNAL MEDICINE

## 2017-11-01 ENCOUNTER — HOSPITAL ENCOUNTER (OUTPATIENT)
Dept: OTHER | Age: 52
Discharge: HOME | End: 2017-11-30

## 2017-11-01 VITALS
RESPIRATION RATE: 17 BRPM | DIASTOLIC BLOOD PRESSURE: 61 MMHG | HEART RATE: 81 BPM | SYSTOLIC BLOOD PRESSURE: 91 MMHG | HEIGHT: 74 IN | TEMPERATURE: 96.5 F

## 2017-11-01 PROCEDURE — 97597 DBRDMT OPN WND 1ST 20 CM/<: CPT | Performed by: INTERNAL MEDICINE

## 2017-11-01 PROCEDURE — G0179 MD RECERTIFICATION HHA PT: HCPCS | Performed by: INTERNAL MEDICINE

## 2017-11-01 PROCEDURE — 17250 CHEM CAUT OF GRANLTJ TISSUE: CPT | Performed by: INTERNAL MEDICINE

## 2017-11-01 ASSESSMENT — PAIN SCALES - GENERAL: PAINLEVEL_OUTOF10: 0

## 2017-11-02 ENCOUNTER — TELEPHONE (OUTPATIENT)
Dept: FAMILY MEDICINE CLINIC | Age: 52
End: 2017-11-02

## 2017-11-02 DIAGNOSIS — E11.65 TYPE 2 DIABETES MELLITUS WITH HYPERGLYCEMIA, WITH LONG-TERM CURRENT USE OF INSULIN (HCC): Primary | ICD-10-CM

## 2017-11-02 DIAGNOSIS — Z79.4 TYPE 2 DIABETES MELLITUS WITH HYPERGLYCEMIA, WITH LONG-TERM CURRENT USE OF INSULIN (HCC): Primary | ICD-10-CM

## 2017-11-02 PROBLEM — L97.923 CHRONIC ULCER OF LEFT LOWER EXTREMITY WITH NECROSIS OF MUSCLE (HCC): Status: ACTIVE | Noted: 2017-06-10

## 2017-11-02 PROBLEM — L89.892 DECUBITUS ULCER OF RIGHT FOOT, STAGE 2 (HCC): Status: ACTIVE | Noted: 2017-11-02

## 2017-11-03 NOTE — PROGRESS NOTES
[facility] Progress Note    Jinny Gay     : 1965    DATE OF VISIT:  10/25/2017    Subjective:     Jinny Gay is a 54 y.o. male who has a pressure ulcer located on the right ischium, right hip, left heel and right lateral foot. Significant symptoms or pertinent wound history since last visit: has had a hard time offloading since being home from the hospital -- the time in bed there definitely helped his right ischium, but I think likely contributed to the left heel ulcer. Swelling still considerable. Trying to eat better (jasmeet protein). No F/C/D. Additional ulcer(s) noted? yes. T-spine surgical dehiscence, left lateral leg diabetic / venous, left lateral foot diabetic / dehisced surgical.    His current medication list consists of apixaban, aspirin, clopidogrel, cyclobenzaprine, docusate sodium, ferrous sulfate, glucose blood VI test strips, insulin aspart, insulin detemir, lisinopril, loratadine, magnesium oxide, metFORMIN, metoprolol succinate, nystatin, oxyCODONE, pantoprazole, pravastatin, senna, sodium polystyrene, torsemide, and traZODone. Allergies: Benadryl [diphenhydramine hcl]; Statins [statins]; Cephalexin; Morphine; and Sulfa antibiotics. Objective:     Vitals:    10/25/17 1041   BP: 97/60   Pulse: 93   Temp: 97 °F (36.1 °C)   TempSrc: Oral   Height: 6' 2\" (1.88 m)     AAOx3, fatigued, overweight, NAD; no resp distress with modest activity  No icterus, thrush, abd tenderness  Distal pulses Dopplerable, feet mostly warm, decent cap refill  No cellulitis, angitis, fluctuance, acute arthritis; moderate B/L LE edema  Chetna-ulcer skin: mostly pink and indurated, mild erythema-ecchymoses near ischium, increased pressure erythema at mid-back, mild maceration at left leg, mild hyperkeratosis at right foot.   Ulcer(s): upper back larger, more necrotic, more exposed hardware, no pus; ischium partly granular, partly fibrin and biofilm, serous exudate, a bit larger and looks recently traumatized; right hip sinus tract stable; right lateral foot newly opened, midfoot and rearfoot, proximal to old surgical scar, sizeable in area but superficial, red, hypergranular, clean; left heel small DTI, a bit soft but not fluctuant; left leg inflamed, a bit of distal SQ fibronecrosis, central exposed muscle, friable granulation, easy bleeding, fragile wound edge; left lateral foot clean, granular, but deep again (4 cm), cloudy serosanguinous drainage. Pre-debridement wound measurements are in the wound documentation flowsheet. Photos also saved in electronic chart. 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 7XXA    Iron deficiency anemia D50.9    Lymphedema of both lower extremities I89.0    Neurogenic bladder N31.9    Neurogenic bowel K59.2    Decubitus ulcer of right ischium, stage 4 ([facility]) L89.314    Hypergranulation L92.9    Dehiscence of surgical wound of T-spine T81.31XA    Chronic ulcer of left lower extremity with necrosis of muscle ([facility]) L97.923    Onychomycosis B35.1    Dystrophic nail L60.3    Diabetic ulcer of left midfoot associated with type 2 diabetes mellitus, with necrosis of muscle ([facility]) E11.621, L97.423    Peripheral venous insufficiency I87.2    Ischemic cardiomyopathy I25.5    Chronic systolic congestive heart failure ([facility]) I50.22    Decubitus ulcer of right foot, stage 2 M20.663 The patient tolerated the procedure well, with no significant complications. The patient's level of pain during and after the procedure was monitored, and is noted in the wound documentation flowsheet.  ________________    To encourage better epithelial cell coverage, I did use AgNO3 to chemically cauterize hypergranulation tissue on the right foot and left leg ulcer(s), after application of 4% lidocaine topical solution. This was tolerated well, with no pain or skin injury. Discharge plan:     Treatment in the wound care center today: Wound 10/25/17 #29, right lateral foot cluster, pressure stage 2, onset 10/25/17, pressure-Dressing/Treatment:  (Sensicare,Aquacel Ag,gauze,Kerlix,sgl med tubi)  Pressure Ulcer 10/10/17 Heel Left;Posterior #28 Left posterior heel Deep Tissue Injury - recurrent-Dressing/Treatment:  (Betadine,gauze,Rancho,Dbl Med. Tubigrip)  Pressure Ulcer 09/18/14 #7, right hip, stage 4 PU, onset 8/7/14-Dressing/Treatment:  (Zinc oxide,1/4\" Iodoform,Allkare,Mepilex border)  Wound 10/17/14  #8, right ischium, stage 4 PU, onset 7/15/14-Dressing/Treatment:  (Sensicare,Aquacel Ag,gauze bolster,Mepilex border)  Wound 08/16/17 #27- Thoracic spine, dehisced surgical wound, full thickness, onset 8/16/2017, pressure-Dressing/Treatment:  (Sensicare,Aquacel Ag,ABD,)  Wound 06/07/17 #24 Left lateral lower leg-cluster, Venous, full thickness,  (onset  6/5/17) unknown-Dressing/Treatment:  (Sensicare,Aquacel Ag,ABD,Kerlix Dbl Med. Tubigrip)  [REMOVED] Wound 02/13/17 #22 left lateral foot, Mccauley 3 DFU, onset 2/2017, surgical dehiscence (merged with #29)-Dressing/Treatment:  (Zinc oxide,Aquacel Ag,tammie,Kerlix,sgl Med Tubigrip).     I reminded the patient of the importance of weight management and smoking cessation, if applicable; also encouraged ambulation as tolerated, additional lower extremity exercises as instructed in our education sheet, leg elevation when at rest, and compliance with any recommended dietary,

## 2017-11-06 LAB — POTASSIUM SERPL-SCNC: 4.8 MMOL/L (ref 3.5–5.1)

## 2017-11-08 ENCOUNTER — HOSPITAL ENCOUNTER (OUTPATIENT)
Dept: WOUND CARE | Age: 52
Discharge: HOME | End: 2017-11-08
Attending: INTERNAL MEDICINE | Admitting: INTERNAL MEDICINE

## 2017-11-08 ENCOUNTER — TELEPHONE (OUTPATIENT)
Dept: FAMILY MEDICINE CLINIC | Age: 52
End: 2017-11-08

## 2017-11-08 VITALS
BODY MASS INDEX: 34.99 KG/M2 | SYSTOLIC BLOOD PRESSURE: 101 MMHG | RESPIRATION RATE: 20 BRPM | HEART RATE: 97 BPM | DIASTOLIC BLOOD PRESSURE: 60 MMHG | WEIGHT: 272.5 LBS | TEMPERATURE: 97.4 F

## 2017-11-08 DIAGNOSIS — I11.0 HYPERTENSIVE HEART DISEASE WITH HEART FAILURE (HCC): Primary | ICD-10-CM

## 2017-11-08 PROCEDURE — 11042 DBRDMT SUBQ TIS 1ST 20SQCM/<: CPT | Performed by: INTERNAL MEDICINE

## 2017-11-08 PROCEDURE — 11045 DBRDMT SUBQ TISS EACH ADDL: CPT | Performed by: INTERNAL MEDICINE

## 2017-11-08 PROCEDURE — 11046 DBRDMT MUSC&/FSCA EA ADDL: CPT | Performed by: INTERNAL MEDICINE

## 2017-11-08 PROCEDURE — 11043 DBRDMT MUSC&/FSCA 1ST 20/<: CPT | Performed by: INTERNAL MEDICINE

## 2017-11-08 ASSESSMENT — PAIN DESCRIPTION - LOCATION: LOCATION: NECK;SHOULDER

## 2017-11-08 ASSESSMENT — PAIN DESCRIPTION - DESCRIPTORS: DESCRIPTORS: BURNING;CONSTANT

## 2017-11-08 ASSESSMENT — PAIN DESCRIPTION - PROGRESSION: CLINICAL_PROGRESSION: NOT CHANGED

## 2017-11-08 ASSESSMENT — PAIN DESCRIPTION - PAIN TYPE: TYPE: CHRONIC PAIN

## 2017-11-08 ASSESSMENT — PAIN DESCRIPTION - ORIENTATION: ORIENTATION: LEFT

## 2017-11-08 ASSESSMENT — PAIN SCALES - GENERAL: PAINLEVEL_OUTOF10: 3

## 2017-11-08 ASSESSMENT — PAIN DESCRIPTION - ONSET: ONSET: ON-GOING

## 2017-11-08 ASSESSMENT — PAIN DESCRIPTION - FREQUENCY: FREQUENCY: CONTINUOUS

## 2017-11-09 NOTE — PLAN OF CARE
Problem: Wound:  Goal: Will show signs of wound healing; wound closure and no evidence of infection  Will show signs of wound healing; wound closure and no evidence of infection   Outcome: Ongoing  Right lateral foot wound with more necrotic tissue this week, debridement per Dr. Moody Hanson, Will cont. With Santyl & AqAg as ordered. Pt. States he doesn't believe that the wound is getting any pressure to it. Left heel stable, change to using allkare skin prep & cover with dry dressing. Pt. Wearing prevlon boots for off-loading of heels. Left lateral foot wound stable, no debridement, cont. With current wound care regime. Dr. Moody Hanson did talk with Dr. Abimael Larson & is willing to see pt. For 2nd opinion, pt. Aware to call their office to schedule appt. Left lateral wound stable, debridement per Dr. Moody Hanson, bone exposure noted, will cont. With current wound care regime. Thoracic spine wound noted to have more necrotic tissue superiorly, debridement per Dr. Abdirahman Cooper. With drsg. As ordered. Pt. Cont. To await cardiac clearance before Dr. Mily Mistry can move forward with hardware removal. Right Ischial & buttock wounds stable, no procedure, cont. With current wound care regime. Reinforced importance of off-loading & frequent repositioning. Pt. States he is spending less time in his chair. Also pt. States that [facility] evaluated his chair & did pressure mapping. Pt. Hoping to get a new power chair & off-loading devices for chair. Dr. Moody Hanson restarted pt. On Doxycycline & Augmentin antibiotic as directed. Pt. To call if he has any adverse reactions with the antibiotics. F/u in [address] [place] [address],[address] in 1 week as ordered. Discharge instructions reviewed with patient, all questions answered, copy given to patient. Dressings were applied to all wounds per M.D. Instructions at this visit.

## 2017-11-11 NOTE — PROGRESS NOTES
[facility] Progress Note    Ysabel Chapin     : 1965    DATE OF VISIT:  2017    Subjective:     Ysabel Chapin is a 54 y.o. male who has a pressure ulcer located on the right ischium, right foot and right hip. Significant symptoms or pertinent wound history since last visit: feeling ok, no increase in SOB, no fever. Diarrhea from last week resolving -- he thinks it was more likely from his Augmentin than doxycycline, as he's taken both before and remembers how he did. No fever, no N/V. Focused on protein intake, still working on fluid intake, still working on offloading. Additional ulcer(s) noted? yes. T-spine surgical wound, left heel DTI, left foot and left leg wounds. His current medication list consists of Alcohol Wipes, Insulin Pen Needle, Insulin Syringe-Needle U-100, Lancets, apixaban, aspirin, clopidogrel, collagenase, cyclobenzaprine, docusate sodium, ferrous sulfate, glucose blood VI test strips, insulin aspart, insulin detemir, lisinopril, loratadine, magnesium oxide, metFORMIN, metoprolol succinate, nystatin, oxyCODONE, pantoprazole, pravastatin, senna, sodium polystyrene, torsemide, and traZODone. Allergies: Benadryl [diphenhydramine hcl]; Statins [statins]; Cephalexin; Morphine; and Sulfa antibiotics    Objective:     Vitals:    17 1130   BP: 91/61   Pulse: 81   Resp: 17   Temp: 96.5 °F (35.8 °C)   TempSrc: Oral   Height: 6' 2\" (1.88 m)     AAOx3, overweight, fatigued, NAD  Dopplerable distal pulses, feet warm; no cellulitis, angitis, fluctuance  Moderate LE edema, mostly pitting apart from firm lymphedema at right thigh  No Candidiasis or contact dermatitis from dressings  Chetna-ulcer skin: generally pink and indurated, mild maceration and erythema at right ischium, mild ecchymosis and lysing epidermis at T-spine.   Ulcer(s): T-spine larger, more peripherally necrotic, a good deal of exposed hardware, some deep granulation and some fibrosis, serous exudate; right ischium mostly clean, red, hypergranular, not smaller this week; right hip stable sinus tract; right foot ulcer still superficial but larger, fibrinous exudate and debris; left leg peripherally red and hypergranular, centrally SQ fibronecrotic but very friable; left foot mostly granular but quite deep, some cloudy and bloody drainage with deep probing. Pre-debridement wound measurements are in the wound documentation flowsheet. Photos also saved in electronic chart. 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 7XXA    Iron deficiency anemia D50.9    Lymphedema of both lower extremities I89.0    Neurogenic bladder N31.9    Neurogenic bowel K59.2    Decubitus ulcer of right ischium, stage 4 ([facility]) L89.314    Hypergranulation L92.9    Dehiscence of surgical wound of T-spine T81.31XA    Chronic ulcer of left lower extremity with necrosis of muscle ([facility]) L97.923    Onychomycosis B35.1    Dystrophic nail L60.3    Diabetic ulcer of left midfoot associated with type 2 diabetes mellitus, with necrosis of muscle ([facility]) E11.621, L97.423    Peripheral venous insufficiency I87.2    Ischemic cardiomyopathy I25.5    Chronic systolic congestive heart failure ([facility]) I50.22    Decubitus ulcer of right foot, stage 2 L89.892       Assessment of today's active condition(s): DM, LE edema, paraplegia, chronic T-spine osteo and hardware infection, complications. The patient's level of pain during and after the procedure was monitored, and is noted in the wound documentation flowsheet.  ______    To encourage better epithelial cell coverage, I did use AgNO3 to chemically cauterize hypergranulation tissue on the right ischium and periphery of the left leg ulcer(s), after application of 4% lidocaine topical solution. This was tolerated well, with no pain or skin injury. Discharge plan:     Treatment in the wound care center today: Wound 10/25/17 #29, right lateral foot cluster, pressure stage 2, onset 10/25/17, pressure-Dressing/Treatment: Protective barrier, Silver dressing, Triad hydro (double e and dbl d tubi)  Pressure Ulcer 10/10/17 Heel Left;Posterior #28 Left posterior heel Deep Tissue Injury - recurrent-Dressing/Treatment: Silicone dressing (betadine)  [REMOVED] Wound 02/13/17 #22 left lateral foot, Mccauley 3 DFU, onset 2/2017, surgical dehiscence -Dressing/Treatment:  (Zinc oxide,Aquacel Ag,tammie,Kerlixdoublel Med Tubigrip)  Pressure Ulcer 09/18/14 #7, right hip, stage 4 PU, onset 8/7/14-Dressing/Treatment: Packing, Protective barrier, Silicone dressing (iodoform)  Wound 08/16/17 #27- Thoracic spine, dehisced surgical wound, full thickness, onset 8/16/2017, pressure-Dressing/Treatment: [place], Dry dressing, Protective barrier, Silicone dressing, Silver dressing  Wound 10/17/14  #8, right ischium, stage 4 PU, onset 7/15/14-Dressing/Treatment: Pressure dressing, Silicone dressing, Silver dressing (gauze bolster)  Wound 06/07/17 #24 Left lateral lower leg-cluster, Venous, full thickness,  (onset  6/5/17) unknown-Dressing/Treatment: Triad hydro (Sensicare,Aquacel Ag,ABD,Kerlix Dbl Med. Tubigrip)  Wound 11/01/17 #30- R mid buttock, pressure, Stage 2, onset 11/1/17, pressure-Dressing/Treatment: ABD, Protective barrier, Silicone dressing, Silver dressing (gauze bolster).     Since T-spine surgery is not on the immediate horizon, we'll use this time to

## 2017-11-15 ENCOUNTER — HOSPITAL ENCOUNTER (OUTPATIENT)
Dept: WOUND CARE | Age: 52
Discharge: HOME | End: 2017-11-15
Attending: INTERNAL MEDICINE | Admitting: INTERNAL MEDICINE

## 2017-11-15 VITALS
HEIGHT: 74 IN | DIASTOLIC BLOOD PRESSURE: 59 MMHG | RESPIRATION RATE: 17 BRPM | SYSTOLIC BLOOD PRESSURE: 99 MMHG | HEART RATE: 89 BPM | TEMPERATURE: 97.1 F

## 2017-11-15 LAB
ANION GAP SERPL CALCULATED.3IONS-SCNC: 15 MMOL/L (ref 3–16)
BUN BLDV-MCNC: 31 MG/DL (ref 7–20)
CALCIUM SERPL-MCNC: 8.9 MG/DL (ref 8.3–10.6)
CHLORIDE BLD-SCNC: 98 MMOL/L (ref 99–110)
CO2: 22 MMOL/L (ref 21–32)
CREAT SERPL-MCNC: 0.9 MG/DL (ref 0.9–1.3)
GFR AFRICAN AMERICAN: >60
GFR NON-AFRICAN AMERICAN: >60
GLUCOSE BLD-MCNC: 125 MG/DL (ref 70–99)
POTASSIUM SERPL-SCNC: 5.4 MMOL/L (ref 3.5–5.1)
SODIUM BLD-SCNC: 135 MMOL/L (ref 136–145)

## 2017-11-15 PROCEDURE — 97597 DBRDMT OPN WND 1ST 20 CM/<: CPT | Performed by: INTERNAL MEDICINE

## 2017-11-15 PROCEDURE — 11044 DBRDMT BONE 1ST 20 SQ CM/<: CPT | Performed by: INTERNAL MEDICINE

## 2017-11-15 PROCEDURE — 11047 DBRDMT BONE EACH ADDL: CPT | Performed by: INTERNAL MEDICINE

## 2017-11-15 PROCEDURE — 11042 DBRDMT SUBQ TIS 1ST 20SQCM/<: CPT | Performed by: INTERNAL MEDICINE

## 2017-11-15 ASSESSMENT — PAIN DESCRIPTION - ONSET: ONSET: ON-GOING

## 2017-11-15 ASSESSMENT — PAIN DESCRIPTION - PAIN TYPE: TYPE: CHRONIC PAIN

## 2017-11-15 ASSESSMENT — PAIN SCALES - GENERAL: PAINLEVEL_OUTOF10: 3

## 2017-11-15 ASSESSMENT — PAIN DESCRIPTION - ORIENTATION: ORIENTATION: LEFT

## 2017-11-15 ASSESSMENT — PAIN DESCRIPTION - PROGRESSION: CLINICAL_PROGRESSION: NOT CHANGED

## 2017-11-15 ASSESSMENT — PAIN DESCRIPTION - FREQUENCY: FREQUENCY: CONTINUOUS

## 2017-11-15 ASSESSMENT — PAIN DESCRIPTION - DESCRIPTORS: DESCRIPTORS: ACHING;CONSTANT

## 2017-11-15 ASSESSMENT — PAIN DESCRIPTION - LOCATION: LOCATION: NECK;SHOULDER

## 2017-11-16 PROBLEM — L89.890 UNSTAGEABLE PRESSURE ULCER OF RIGHT FOOT (HCC): Status: ACTIVE | Noted: 2017-11-02

## 2017-11-16 NOTE — PROGRESS NOTES
of left midfoot associated with type 2 diabetes mellitus, with necrosis of muscle ([facility]) E11.621, L97.423    Peripheral venous insufficiency I87.2    Ischemic cardiomyopathy I25.5    Chronic systolic congestive heart failure ([facility]) I50.22    Unstageable pressure ulcer of right foot ([facility]) L89.890       Assessment of today's active condition(s): DM, paraplegia, multiple wounds related to pressure, infection from prior surgeries, his DM and venous disease / lymphedema. No spreading soft tissue infection, but presumed osteomyelitis in T-spine and probably left foot. In need of surgical therapy for both of those, but recent cardiac events make that risky right now (jasmeet the spine). Unfortunately he's declined overall in recent weeks, from a combination of intrinsic wound factors plus CHF, imperfect nutrition, imperfect offloading, etc. Factors contributing to occurrence and/or persistence of the chronic ulcer include edema, venous stasis, lymphedema, diabetes, chronic pressure, decreased mobility, shear force, obesity and decreased tissue oxygenation. Medical necessity of today's visit is shown by the above documentation. Sharp debridement is indicated today, based upon the exam findings in the wound(s) above. Procedure note:     Consent obtained. Time out performed per [facility] policy. Anesthetic  Anesthetic: None     Using a curette, #15 blade scalpel and forceps, I sharply debrided the right foot and left leg ulcer(s) down through and including the removal of subcutaneous tissue. The type(s) of tissue debrided included fibrin, slough and necrotic/eschar. Total Surface Area Debrided: approx 45 sq cm. Using a curette, forceps and # 10 blade scalpel, I sharply debrided the back ulcer(s) down through and including the removal of muscle/fascia. The type(s) of tissue debrided included fibrin, slough and necrotic/eschar. Total Surface Area Debrided: approx 30 sq cm.     Post  Debridement Measurements:  Wound 10/25/17 #29, right lateral foot cluster, pressure stage 2, onset 10/25/17, pressure-Wound Length (cm): 7.5 cm  Wound 06/07/17 #24 Left lateral lower leg-cluster, Venous, full thickness,  (onset  6/5/17) unknown-Wound Length (cm): 13.5 cm  Wound 02/13/17 #22 left lateral foot, Mccauley 3 DFU, onset 2/2017, surgical dehiscence -Wound Length (cm): 1.9 cm  Wound 08/16/17 #27- Thoracic spine, dehisced surgical wound, full thickness, onset 8/16/2017, pressure-Wound Length (cm): 9 cm  Wound 10/17/14  #8, right ischium, stage 4 PU, onset 7/15/14-Wound Length (cm): 5 cm  Wound 11/01/17 #30- R mid buttock, pressure, Stage 2, onset 11/1/17, pressure-Wound Length (cm): 6.5 cm    Wound 10/25/17 #29, right lateral foot cluster, pressure stage 2, onset 10/25/17, pressure-Wound Width (cm): 3 cm  Wound 06/07/17 #24 Left lateral lower leg-cluster, Venous, full thickness,  (onset  6/5/17) unknown-Wound Width (cm): 2 cm  Wound 02/13/17 #22 left lateral foot, Mccauley 3 DFU, onset 2/2017, surgical dehiscence -Wound Width (cm): 0.4 cm  Wound 08/16/17 #27- Thoracic spine, dehisced surgical wound, full thickness, onset 8/16/2017, pressure-Wound Width (cm): 4 cm  Wound 10/17/14  #8, right ischium, stage 4 PU, onset 7/15/14-Wound Width (cm): 1 cm  Wound 11/01/17 #30- R mid buttock, pressure, Stage 2, onset 11/1/17, pressure-Wound Width (cm): 2.5 cm    Wound 10/25/17 #29, right lateral foot cluster, pressure stage 2, onset 10/25/17, pressure-Wound Depth (cm) : 0.3  Wound 06/07/17 #24 Left lateral lower leg-cluster, Venous, full thickness,  (onset  6/5/17) unknown-Wound Depth (cm) : 0.7  Wound 02/13/17 #22 left lateral foot, Mccauley 3 DFU, onset 2/2017, surgical dehiscence -Wound Depth (cm) : 3.8  Wound 08/16/17 #27- Thoracic spine, dehisced surgical wound, full thickness, onset 8/16/2017, pressure-Wound Depth (cm) : 1.5  Wound 10/17/14  #8, right ischium, stage 4 PU, onset 7/15/14-Wound Depth (cm) : 2.5  Wound 11/01/17 #30- R mid buttock, pressure, Stage 2, onset 11/1/17, pressure-Wound Depth (cm) : 0.1    The ulcers were then irrigated with normal saline solution. The procedure was completed with a moderate amount of bleeding, and hemostasis was by pressure and by silver nitrate stick. The patient tolerated the procedure well, with no significant complications. The patient's level of pain during and after the procedure was monitored, and is noted in the wound documentation flowsheet. Discharge plan:     Treatment in the wound care center today: Wound 10/25/17 #29, right lateral foot cluster, pressure stage 2, onset 10/25/17, pressure-Dressing/Treatment: Other (Comment) (ZnO alin,triad,aquacel ag,dry dressing,\"e\"tubi ankle to knee)  Wound 06/07/17 #24 Left lateral lower leg-cluster, Venous, full thickness,  (onset  6/5/17) unknown-Dressing/Treatment: Other (Comment) (ZnO alin,triad,aquacel ag ,4x4,kerlix,dble layer \"e\"tubigrip)  Wound 02/13/17 #22 left lateral foot, Mccauley 3 DFU, onset 2/2017, surgical dehiscence -Dressing/Treatment: Other (Comment) (ZnO alin,triad,aquacel ag,4x4,abd,kerlix,dble \"e\"tubigrip)  Wound 08/16/17 #27- Thoracic spine, dehisced surgical wound, full thickness, onset 8/16/2017, pressure-Dressing/Treatment: Other (Comment) (ZnO alin,aquacel ag,dry dressing )  Wound 10/17/14  #8, right ischium, stage 4 PU, onset 7/15/14-Dressing/Treatment: Other (Comment) (ZnO alin,triad,aquacel ag,4x4 bolster,mepilex border )  Wound 11/01/17 #30- R mid buttock, pressure, Stage 2, onset 11/1/17, pressure-Dressing/Treatment: Other (Comment) (interdry). No Abx for right now; if his creat and K+ are stable for a couple of weeks (off KCl, off Aldactone), will consider desensitizing him to Bactrim to try to suppress the spinal infection to a degree, until his cardiac status is stable enough to undergo surgery. Await consult with Dr. Pratibha Ochoa re: left foot.     I reminded the patient of the importance of weight management and smoking cessation, if applicable; also encouraged ambulation as tolerated, additional lower extremity exercises as instructed in our education sheet, leg elevation when at rest, and compliance with any recommended dietary, diuretic and compression therapies. Keep focused on offloading (Prevalon boots for feet, little time seated, extra cushion for T-spine), glucose control and protein intake. Home treatment: good handwashing before and after any dressing changes. Cleanse wound with saline or soap & water before dressing change. May use Vaseline (petrolatum), Aquaphor, Aveeno, CeraVe, Cetaphil, Eucerin, Lubriderm, etc for dry skin. Dressing type for home: Other: as above, daily. Written discharge instructions given to patient. Follow up in 1 week.     Electronically signed by Jair Arreola MD on 11/16/2017 at 6:23 PM. Yes

## 2017-11-16 NOTE — TELEPHONE ENCOUNTER
Future Appointments  Date Time Provider Alyson Cardona   11/22/2017 10:30 AM Marcia Mirza MD [facility] WND CARE None   Last ov 1/19/17

## 2017-11-17 RX ORDER — BLOOD-GLUCOSE METER
KIT MISCELLANEOUS
Qty: 150 STRIP | Refills: 9 | Status: SHIPPED | OUTPATIENT
Start: 2017-11-17 | End: 2018-08-25

## 2017-11-22 ENCOUNTER — HOSPITAL ENCOUNTER (OUTPATIENT)
Dept: WOUND CARE | Age: 52
Discharge: HOME | End: 2017-11-22
Attending: INTERNAL MEDICINE | Admitting: INTERNAL MEDICINE

## 2017-11-22 VITALS
HEART RATE: 100 BPM | TEMPERATURE: 98 F | RESPIRATION RATE: 17 BRPM | DIASTOLIC BLOOD PRESSURE: 56 MMHG | SYSTOLIC BLOOD PRESSURE: 92 MMHG

## 2017-11-22 PROCEDURE — 17250 CHEM CAUT OF GRANLTJ TISSUE: CPT | Performed by: INTERNAL MEDICINE

## 2017-11-22 PROCEDURE — 11043 DBRDMT MUSC&/FSCA 1ST 20/<: CPT | Performed by: INTERNAL MEDICINE

## 2017-11-22 PROCEDURE — 97597 DBRDMT OPN WND 1ST 20 CM/<: CPT | Performed by: INTERNAL MEDICINE

## 2017-11-22 RX ORDER — METRONIDAZOLE 500 MG/1
TABLET ORAL
Qty: 30 TABLET | Refills: 1 | Status: SHIPPED | OUTPATIENT
Start: 2017-11-22 | End: 2018-03-29

## 2017-11-22 ASSESSMENT — PAIN DESCRIPTION - FREQUENCY: FREQUENCY: CONTINUOUS

## 2017-11-22 ASSESSMENT — PAIN DESCRIPTION - PROGRESSION: CLINICAL_PROGRESSION: GRADUALLY WORSENING

## 2017-11-22 ASSESSMENT — PAIN DESCRIPTION - DESCRIPTORS: DESCRIPTORS: ACHING

## 2017-11-22 ASSESSMENT — PAIN DESCRIPTION - ONSET: ONSET: ON-GOING

## 2017-11-22 ASSESSMENT — PAIN SCALES - GENERAL: PAINLEVEL_OUTOF10: 5

## 2017-11-22 ASSESSMENT — PAIN DESCRIPTION - ORIENTATION: ORIENTATION: RIGHT;LEFT

## 2017-11-22 ASSESSMENT — PAIN DESCRIPTION - LOCATION: LOCATION: SHOULDER

## 2017-11-23 NOTE — PLAN OF CARE
Problem: Wound:  Goal: Will show signs of wound healing; wound closure and no evidence of infection  Will show signs of wound healing; wound closure and no evidence of infection   Outcome: Ongoing  Thoracic spine wound cont. To show further deterioration again this week with more necrotic tissue & odor noted, no debridement, Will add use of Flagyl to wound for malodor & otherwise cont. With Triad & AqAg as ordered. Dr. Tj De La Torre had a lengthy discussion with pt. Re: severity of his wounds & possible outcomes, including death if his wounds continue on this trend. Pt. Gerard Esparzaor understanding of the severity. Dr. Tj De La Torre discussed with patient that we would try to order him a Clinitron bed for home to help with off-loading. Pt. Agreeable & states that his house would be able to hold the bed. Dr. Tj De La Torre advised pt. If he is unable to get the Clinitron for home use we may need to investigate LTAC options, pt. Verbalizes understanding & agreement that something has to change. Dr. Tj De La Torre advised pt. To spend as minimal time as possible on his back & to reposition from side to side. Rt. Ischial wound stable, debridement per Dr. Bhavik Davis. With current wound care regime. Right lateral foot starting to show some improvement this week, debridement per Dr. Madison Hernandez. With Triad & AqAg as ordered. Left heel with eschar stablized & no further necrosis this week, cont. With betadine. Pt. Wears prevlon boots for off-loading of heels. All other wounds stable, no debridement, wound care regime as ordered. Pt. Scheduled with Dr. Dinorah Sen in the next few weeks to get a 2nd opinion on his left lateral foot wound. Otherwise pt. Cont. To follow with cardiology as ordered & await cardiac clearance for surgery on back. Also reinforced importance of nutritional status & increased protein to assist with wound healing. F/u in [address] [place] in 1 week as ordered. Pt. Advised if he has any changes or questions/concerns to call [address] [place] [address],[address], also pts.  [facility] nurse is in frequent communication with Dr. Monie Vasquez. Discharge instructions reviewed with patient, all questions answered, copy given to patient. Dressings were applied to all wounds per M.D. Instructions at this visit.

## 2017-11-24 NOTE — PROGRESS NOTES
[facility] Progress Note    Horacio Rossi     : 1965    DATE OF VISIT:  11/15/2017    Subjective:     Horacio Rossi is a 54 y.o. male who has a pressure ulcer located on the right ischium and buttock, right hip, right foot, left heel, right heel. Significant symptoms or pertinent wound history since last visit: systemically doing ok, but some malaise and fatigue; no recurrence of increased SOB. Diarrhea less, no N/V, no fever or chills, eating ok, trying to minimize time seated. Wearing Prevalon boots. Due to see Dr. Abimael Larson in a couple of weeks, heart failure doc after that, then presumably Dr. Eliana Mann again. BMP was never performed this week, I think due to a lack of order from the cardiologist's office at [facility]. Additional ulcer(s) noted? yes. T-spine surgical dehiscence, left leg venous, left foot diabetic / surgical    His current medication list consists of Alcohol Wipes, Insulin Pen Needle, Insulin Syringe-Needle U-100, Lancets, apixaban, aspirin, clopidogrel, cyclobenzaprine, docusate sodium, ferrous sulfate, glucose blood VI test strips, insulin aspart, insulin detemir, lisinopril, loratadine, magnesium oxide, metFORMIN, metoprolol succinate, nystatin, oxyCODONE, pantoprazole, pravastatin, promethazine, senna, sodium polystyrene, torsemide, and traZODone. Allergies: Benadryl [diphenhydramine hcl]; Statins [statins];  Cephalexin; Morphine; and Sulfa antibiotics    Objective:     Vitals:    11/15/17 1049   BP: (!) 99/59   Pulse: 89   Resp: 17   Temp: 97.1 °F (36.2 °C)   TempSrc: Oral   Height: 6' 2\" (1.88 m)     AAOx3, overweight, weak and fatigued, pale, NAD  Dopplerable distal pulses, feet warm, good cap refill  Mild-moderate lower leg edema, still more severe and indurated at right thigh  No contact dermatitis from dressings, no significant Candidiasis, no cellulitis or angitis  Chetna-ulcer skin: generally pink and indurated, with the exceptions of right buttock Topical Xylocaine     Using a curette, I sharply debrided the right ischium ulcer(s) down through and including the removal of dermis. The type(s) of tissue debrided included fibrin, biofilm and necrotic/eschar. Total Surface Area Debrided: 6 sq cm. Using a curette, forceps and # 10 blade scalpel, I sharply debrided the right foot ulcer(s) down through and including the removal of subcutaneous tissue. The type(s) of tissue debrided included slough and necrotic/eschar. Total Surface Area Debrided: approx 10 sq cm. Using a forceps, # 10 blade scalpel and rongeur, I sharply debrided the back ulcer(s) down through and including the removal of bone. The type(s) of tissue debrided included slough and necrotic/eschar.  Total Surface Area Debrided: approx 30 sq cm (some wound edge and the protruding, sharp focus of bone)    Post  Debridement Measurements:  Wound 11/15/17 #31- R heel, pressure, Stage 2, onset 11/15/17, pressure-Wound Length (cm): 0.5 cm  Wound 02/13/17 #22 left lateral foot, Mccauley 3 DFU, onset 2/2017, surgical dehiscence -Wound Length (cm): 1.6 cm  Wound 10/25/17 #29, right lateral foot cluster, pressure stage 2, onset 10/25/17, pressure-Wound Length (cm): 7.2 cm  Wound 08/16/17 #27- Thoracic spine, dehisced surgical wound, full thickness, onset 8/16/2017, pressure-Wound Length (cm): 9.4 cm  Wound 10/17/14  #8, right ischium, stage 4 PU, onset 7/15/14-Wound Length (cm): 4.5 cm  Wound 11/01/17 #30- R mid buttock, pressure, Stage 2, onset 11/1/17, pressure-Wound Length (cm): 5.4 cm  Wound 06/07/17 #24 Left lateral lower leg-cluster, Venous, full thickness,  (onset  6/5/17) unknown-Wound Length (cm): 13.9 cm    Wound 11/15/17 #31- R heel, pressure, Stage 2, onset 11/15/17, pressure-Wound Width (cm): 0.4 cm  Wound 02/13/17 #22 left lateral foot, Mccauley 3 DFU, onset 2/2017, surgical dehiscence -Wound Width (cm): 0.6 cm  Wound 10/25/17 #29, right lateral foot cluster, pressure stage 2, onset 10/25/17, pressure-Wound Width (cm): 3.6 cm  Wound 08/16/17 #27- Thoracic spine, dehisced surgical wound, full thickness, onset 8/16/2017, pressure-Wound Width (cm): 10.2 cm  Wound 10/17/14  #8, right ischium, stage 4 PU, onset 7/15/14-Wound Width (cm): 1.5 cm  Wound 11/01/17 #30- R mid buttock, pressure, Stage 2, onset 11/1/17, pressure-Wound Width (cm): 2.5 cm  Wound 06/07/17 #24 Left lateral lower leg-cluster, Venous, full thickness,  (onset  6/5/17) unknown-Wound Width (cm): 1.9 cm    Wound 11/15/17 #31- R heel, pressure, Stage 2, onset 11/15/17, pressure-Wound Depth (cm) : 0.1  Wound 02/13/17 #22 left lateral foot, Mccauley 3 DFU, onset 2/2017, surgical dehiscence -Wound Depth (cm) : 3.9  Wound 10/25/17 #29, right lateral foot cluster, pressure stage 2, onset 10/25/17, pressure-Wound Depth (cm) : 0.5  Wound 08/16/17 #27- Thoracic spine, dehisced surgical wound, full thickness, onset 8/16/2017, pressure-Wound Depth (cm) : 1.3  Wound 10/17/14  #8, right ischium, stage 4 PU, onset 7/15/14-Wound Depth (cm) : 2.8  Wound 11/01/17 #30- R mid buttock, pressure, Stage 2, onset 11/1/17, pressure-Wound Depth (cm) : 0.1  Wound 06/07/17 #24 Left lateral lower leg-cluster, Venous, full thickness,  (onset  6/5/17) unknown-Wound Depth (cm) : 0.4    The ulcers were then irrigated with normal saline solution. The procedure was completed with a moderate amount of bleeding, and hemostasis was by pressure and by silver nitrate stick. The patient tolerated the procedure well, with no significant complications. The patient's level of pain during and after the procedure was monitored, and is noted in the wound documentation flowsheet.     Discharge plan:     Treatment in the wound care center today: Wound 11/15/17 #31- R heel, pressure, Stage 2, onset 11/15/17, pressure-Dressing/Treatment:  (tegaderm heel protector)  Wound 02/13/17 #22 left lateral foot, Mccauley 3 DFU, onset 2/2017, surgical dehiscence -Dressing/Treatment: Other (Comment) (ZnO

## 2017-11-26 PROBLEM — L89.894 DECUBITUS ULCER OF RIGHT FOOT, STAGE 4 (HCC): Status: ACTIVE | Noted: 2017-11-02

## 2017-11-27 NOTE — PROGRESS NOTES
[facility] Progress Note    Jinny Gay     : 1965    DATE OF VISIT:  2017    Subjective:     Jinny Gay is a 54 y.o. male who has a pressure ulcer located on the right ischium, buttock, hip, heel, lateral foot, and left heel. Significant symptoms or pertinent wound history since last visit: states that he feels ok overall, no F/C/D, no N/V/D, no SOB above recent baseline. Doing generally well with foot offloading and LE compression, but still having trouble offloading the T-spine wound. Awaiting word back from [address] [place] [address] about a new (more appropriately sized) motorized chair. Sees Dr. Saadia Rabago in a couple of weeks, unfortunately heart failure physician appt isn't for a week or two after that. Additional ulcer(s) noted? yes. T-spine surgical / pressure wound, left lateral foot diabetic / surgical wound, left lateral leg venous / lymphedema ulcer. His current medication list consists of Alcohol Wipes, Insulin Pen Needle, Insulin Syringe-Needle U-100, Lancets, apixaban, aspirin, clopidogrel, cyclobenzaprine, docusate sodium, ferrous sulfate, glucose blood VI test strips, insulin aspart, insulin detemir, lisinopril, loratadine, magnesium oxide, metFORMIN, metoprolol succinate, metroNIDAZOLE, nystatin, oxyCODONE, pantoprazole, pravastatin, promethazine, senna, sodium polystyrene, torsemide, and traZODone. Allergies: Benadryl [diphenhydramine hcl]; Statins [statins];  Cephalexin; Morphine; and Sulfa antibiotics    Objective:     Vitals:    17 1107   BP: (!) 92/56   Pulse: 100   Resp: 17   Temp: 98 °F (36.7 °C)   TempSrc: Oral     AAOx3, overweight, fatigued, somewhat pale, NAD  Dopplerable distal pulses, feet warm, good cap refill  No cellulitis, angitis, acute arthritis, fluctuance, Candidiasis or significant contact dermatitis  Moderate B/L LE stage 2 lymphedema, still more severe at right thigh  Chetna-ulcer skin: generally pink and indurated, except erythema-ecchymosis at right buttock, dusky color at mid-back, mild hyperkeratosis left heel. Ulcer(s): mid-back wound deteriorating every week -- more peripheral and wound-edge necrosis, more visible hardware, more undermining, some pink granulation tissue but a good deal of slough and fibrin, serous exudate, but new significant malodor this week (necrotic / anaerobic); right ischium stable in size, fibrinous necrosis +/- biofilm over a mostly granular base; buttock ulcer red, granular, friable; right hip stable sinus tract; right heel stable stage 2, pink, a bit fibrotic; right lateral foot with deeper necrosis centrally this week, into muscle and tendon, but improved peripheral granulation; left heel black eschar basically stable; left foot granular wound but still probing deeply, presumably to bone, with cloudy serosanguinous exudate; left leg slowly better with more granulation, less fibronecrosis, some hypergranulation too, no exposed bone or pus. Pre-debridement wound measurements are in the wound documentation flowsheet. Photos also saved in electronic chart. K+ last week 5.4, repeat ordered for next Monday. 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 7XXA    Iron deficiency anemia D50.9    Lymphedema of both lower extremities I89.0    Neurogenic bladder N31.9    Neurogenic bowel K59.2    Decubitus ulcer of right ischium, stage 4 ([address] L89.314 including the removal of muscle/fascia. The type(s) of tissue debrided included fibrin, slough and necrotic/eschar. Total Surface Area Debrided: approx 15 sq cm.     Post  Debridement Measurements:  Wound 10/25/17 #29, right lateral foot cluster, pressure stage 2, onset 10/25/17, pressure-Wound Length (cm): 7 cm  Wound 11/15/17 #31- R heel, pressure, Stage 2, onset 11/15/17, pressure-Wound Length (cm): 0.6 cm  Wound 06/07/17 #24 Left lateral lower leg-cluster, Venous, full thickness,  (onset  6/5/17) unknown-Wound Length (cm): 14.9 cm  Wound 02/13/17 #22 left lateral foot, Mccauley 3 DFU, onset 2/2017, surgical dehiscence -Wound Length (cm): 1.5 cm  Wound 08/16/17 #27- Thoracic spine, dehisced surgical wound, full thickness, onset 8/16/2017, pressure-Wound Length (cm): 10.2 cm  Wound 10/17/14  #8, right ischium, stage 4 PU, onset 7/15/14-Wound Length (cm): 4 cm  Wound 11/01/17 #30- R mid buttock, pressure, Stage 2, onset 11/1/17, pressure-Wound Length (cm): 6.5 cm    Wound 10/25/17 #29, right lateral foot cluster, pressure stage 2, onset 10/25/17, pressure-Wound Width (cm): 3.8 cm  Wound 11/15/17 #31- R heel, pressure, Stage 2, onset 11/15/17, pressure-Wound Width (cm): 0.6 cm  Wound 06/07/17 #24 Left lateral lower leg-cluster, Venous, full thickness,  (onset  6/5/17) unknown-Wound Width (cm): 1 cm  Wound 02/13/17 #22 left lateral foot, Mccauley 3 DFU, onset 2/2017, surgical dehiscence -Wound Width (cm): 0.5 cm  Wound 08/16/17 #27- Thoracic spine, dehisced surgical wound, full thickness, onset 8/16/2017, pressure-Wound Width (cm): 10 cm  Wound 10/17/14  #8, right ischium, stage 4 PU, onset 7/15/14-Wound Width (cm): 1.2 cm  Wound 11/01/17 #30- R mid buttock, pressure, Stage 2, onset 11/1/17, pressure-Wound Width (cm): 2.5 cm    Wound 10/25/17 #29, right lateral foot cluster, pressure stage 2, onset 10/25/17, pressure-Wound Depth (cm) : 1  Wound 11/15/17 #31- R heel, pressure, Stage 2, onset 11/15/17, pressure-Wound Depth \"e\"tubigrip)  Wound 08/16/17 #27- Thoracic spine, dehisced surgical wound, full thickness, onset 8/16/2017, pressure-Dressing/Treatment: Other (Comment) (ZnO alin,aquacel ag,dry dressing )  Wound 10/17/14  #8, right ischium, stage 4 PU, onset 7/15/14-Dressing/Treatment: Other (Comment) (ZnO alin,triad,aquacel ag,4x4 bolster,mepilex border )  Wound 11/01/17 #30- R mid buttock, pressure, Stage 2, onset 11/1/17, pressure-Dressing/Treatment: Other (Comment) (aquacal ag, bolster, border, interdry). Continue current LE compression, Prevalon boots for foot offloading. Though it might place excess pressure elsewhere, I asked him to completely minimize the amount of time spent supine, preferring that he change from left- to right-side lying, but even a bit more time seated would be preferable to spending time supine. Will add crushed Flagyl for malodor in back wound. Await next week's K+ level, before considering bactrim desensitization and attempt to calm down the spinal infection with that. Await DPM eval, cardiac and then spinal surgeon follow-up. Will see what I can do about getting Mr. Godwin Arboleda in a Clinitron bed, for maximal offloading of the back and buttocks; I'm hopeful that we can try to make that happen at home, but if not, some time in a skilled nursing facility would be an option. Home treatment: good handwashing before and after any dressing changes. Cleanse wound with saline or soap & water before dressing change. May use Vaseline (petrolatum), Aquaphor, Aveeno, CeraVe, Cetaphil, Eucerin, Lubriderm, etc for dry skin. Dressing type for home: Other: as above, daily. Written discharge instructions given to patient. Follow up in 1 week.     Electronically signed by Joni Anderson MD on 11/26/2017 at 7:16 PM.

## 2017-11-29 ENCOUNTER — HOSPITAL ENCOUNTER (OUTPATIENT)
Dept: WOUND CARE | Age: 52
Discharge: HOME | End: 2017-11-29
Attending: INTERNAL MEDICINE | Admitting: INTERNAL MEDICINE

## 2017-11-29 VITALS
HEART RATE: 85 BPM | RESPIRATION RATE: 16 BRPM | BODY MASS INDEX: 35.47 KG/M2 | SYSTOLIC BLOOD PRESSURE: 95 MMHG | TEMPERATURE: 96.7 F | HEIGHT: 74 IN | WEIGHT: 276.4 LBS | DIASTOLIC BLOOD PRESSURE: 60 MMHG

## 2017-11-29 DIAGNOSIS — L89.620 PRESSURE ULCER OF LEFT HEEL, UNSTAGEABLE (HCC): ICD-10-CM

## 2017-11-29 DIAGNOSIS — L89.612 PRESSURE ULCER OF RIGHT HEEL, STAGE 2 (HCC): ICD-10-CM

## 2017-11-29 PROCEDURE — 11046 DBRDMT MUSC&/FSCA EA ADDL: CPT | Performed by: INTERNAL MEDICINE

## 2017-11-29 PROCEDURE — 97597 DBRDMT OPN WND 1ST 20 CM/<: CPT | Performed by: INTERNAL MEDICINE

## 2017-11-29 PROCEDURE — 11043 DBRDMT MUSC&/FSCA 1ST 20/<: CPT | Performed by: INTERNAL MEDICINE

## 2017-11-29 ASSESSMENT — PAIN DESCRIPTION - ORIENTATION: ORIENTATION: RIGHT

## 2017-11-29 ASSESSMENT — PAIN DESCRIPTION - DESCRIPTORS: DESCRIPTORS: ACHING

## 2017-11-29 ASSESSMENT — PAIN DESCRIPTION - FREQUENCY: FREQUENCY: CONTINUOUS

## 2017-11-29 ASSESSMENT — PAIN DESCRIPTION - PAIN TYPE: TYPE: CHRONIC PAIN

## 2017-11-29 ASSESSMENT — PAIN DESCRIPTION - PROGRESSION: CLINICAL_PROGRESSION: GRADUALLY WORSENING

## 2017-11-29 ASSESSMENT — PAIN DESCRIPTION - ONSET: ONSET: ON-GOING

## 2017-11-29 ASSESSMENT — PAIN DESCRIPTION - LOCATION: LOCATION: SHOULDER

## 2017-11-29 ASSESSMENT — PAIN SCALES - GENERAL: PAINLEVEL_OUTOF10: 4

## 2017-11-30 NOTE — PLAN OF CARE
answered, copy given to patient. Dressings were applied to all wounds per M.D. Instructions at this visit.

## 2017-12-01 ENCOUNTER — HOSPITAL ENCOUNTER (OUTPATIENT)
Dept: OTHER | Age: 52
Discharge: HOME | End: 2017-12-31
Attending: INTERNAL MEDICINE | Admitting: INTERNAL MEDICINE

## 2017-12-01 LAB — POTASSIUM SERPL-SCNC: 5.9 MMOL/L (ref 3.5–5.1)

## 2017-12-04 LAB — POTASSIUM SERPL-SCNC: 5.6 MMOL/L (ref 3.5–5.1)

## 2017-12-05 PROBLEM — L97.926 NON-PRESSURE CHRONIC ULCER OF LEFT LOWER LEG WITH BONE INVOLVEMENT WITHOUT EVIDENCE OF NECROSIS (HCC): Status: ACTIVE | Noted: 2017-06-10

## 2017-12-05 PROBLEM — L89.620 PRESSURE ULCER OF LEFT HEEL, UNSTAGEABLE (HCC): Status: ACTIVE | Noted: 2017-12-05

## 2017-12-05 PROBLEM — L89.612 PRESSURE ULCER OF RIGHT HEEL, STAGE 2 (HCC): Status: ACTIVE | Noted: 2017-12-05

## 2017-12-05 NOTE — PROGRESS NOTES
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 7XXA    Iron deficiency anemia D50.9    Lymphedema of both lower extremities I89.0    Neurogenic bladder N31.9    Neurogenic bowel K59.2    Pressure ulcer of right buttock, stage 4 ([facility]) L89.314    Hypergranulation L92.9    Dehiscence of surgical wound of T-spine T81.31XA    Chronic ulcer of left lower leg with bone involvement without evidence of necrosis L97.926    Onychomycosis B35.1    Dystrophic nail L60.3    Diabetic ulcer of left midfoot associated with type 2 diabetes mellitus, with necrosis of bone ([facility]) E11.621, L97.424    Peripheral venous insufficiency I87.2    Ischemic cardiomyopathy I25.5    Chronic systolic congestive heart failure ([facility]) I50.22    Pressure ulcer of right foot, stage 4 ([facility]) L89.894    Pressure ulcer of right heel, stage 2 L89.612    Pressure ulcer of left heel, unstageable ([facility]) L89.620       Assessment of today's active condition(s): paraplegia, DM, multiple nonhealing wounds (pressure ulcers, surgical dehiscences related to deep infection, also a recurrent LLE ulcer that opened up when his edema increased markedly -- bone involvement seen again there now). The only area of soft tissue infection seems to be the mid-back wound, but treatment there will be difficult, given retained hardware plus the (R) organisms involved, his GI intolerance to Augmentin and doxycycline, and his allergy to Bactrim + his persistent hyperkalemia. Factors contributing to occurrence and/or persistence of the chronic ulcer include venous stasis, lymphedema, diabetes, chronic pressure, decreased mobility, shear force, obesity and decreased tissue oxygenation. Medical necessity of today's visit is shown by the above documentation. buttock, pressure, Stage 2, onset 11/1/17, pressure (merged with #8)-Wound Width (cm): 0 cm  Wound 10/17/14  #8, right ischium, stage 4 PU, onset 7/15/14 (merged with #30)-Wound Width (cm): 1.7 cm  Wound 06/07/17 #24 Left lateral lower leg-cluster, Venous, full thickness,  (onset  6/5/17) unknown-Wound Width (cm): 2.5 cm  Wound 02/13/17 #22 left lateral foot, Mccauley 3 DFU, onset 2/2017, surgical dehiscence -Wound Width (cm): 0.4 cm  Wound 11/15/17 #31- R heel, pressure, Stage 2, onset 11/15/17, pressure-Wound Width (cm): 0.8 cm    Wound 10/25/17 #29, right lateral foot cluster, pressure stage 4, onset 10/25/17, pressure-Wound Depth (cm) : 1.3  Wound 08/16/17 #27- Thoracic spine, dehisced surgical wound, full thickness, onset 8/16/2017, pressure-Wound Depth (cm) : 2.5  [REMOVED] Wound 11/01/17 #30- R mid buttock, pressure, Stage 2, onset 11/1/17, pressure (merged with #8)-Wound Depth (cm) : 0  Wound 10/17/14  #8, right ischium, stage 4 PU, onset 7/15/14 (merged with #30)-Wound Depth (cm) : 3  Wound 06/07/17 #24 Left lateral lower leg-cluster, Venous, full thickness,  (onset  6/5/17) unknown-Wound Depth (cm) : 0.9  Wound 02/13/17 #22 left lateral foot, Mcca[place]y 3 DFU, onset 2/2017, surgical dehiscence -Wound Depth (cm) : 3.7  Wound 11/15/17 #31- R heel, pressure, Stage 2, onset 11/15/17, pressure-Wound Depth (cm) : 0.2     (Left heel measurements not included above, but they were 4.5 x 4.3 x 0.1 cm). The ulcers were then irrigated with normal saline solution. The procedure was completed with a moderate amount of bleeding, and hemostasis was by pressure and by silver nitrate stick. The patient tolerated the procedure well, with no significant complications. The patient's level of pain during and after the procedure was monitored, and is noted in the wound documentation flowsheet.     Discharge plan:     Treatment in the wound care center today: Wound 10/25/17 #29, right lateral foot cluster, pressure stage 4, onset 10/25/17, pressure-Dressing/Treatment: Other (Comment) (ZnO alin,triad,aquacel ag,dry dressing,\"e double\"toesto knee)  Wound 08/16/17 #27- Thoracic spine, dehisced surgical wound, full thickness, onset 8/16/2017, pressure-Dressing/Treatment: Other (Comment) (ZnO alin,aquacel ag,dry dressing )  Wound 10/17/14  #8, right ischium, stage 4 PU, onset 7/15/14 (merged with #30)-Dressing/Treatment: Other (Comment) (ZnO alin,triad,aquacel ag,4x4 bolster,mepilex border )  Wound 06/07/17 #24 Left lateral lower leg-cluster, Venous, full thickness,  (onset  6/5/17) unknown-Dressing/Treatment: Other (Comment) (ZnO alin,triad,aquacel ag ,4x4,kerlix,dble layer \"e\"tubigrip)  Wound 02/13/17 #22 left lateral foot, Mcca[place]y 3 DFU, onset 2/2017, surgical dehiscence -Dressing/Treatment: Other (Comment) (ZnO alin,triad,aquacel ag,4x4,abd,kerlix,dble \"e\"tubigrip)  Wound 11/15/17 #31- R heel, pressure, Stage 2, onset 11/15/17, pressure-Dressing/Treatment: Dry dressing, 4x4 (double e tubigrip). No systemic Abx for now. Await K+ level on Friday -- if persistently elevated, will have to abandon Bactrim desensitization plans, I think -- options would be no Abx or a PICC again. Agree with Dr. Kathi Rosado' surgical plans. Will be sure he gets a [place] bed while admitted to [facility] for several days. Keep up improved work with offloading; usual focus on protein intake and glucose control. Await cardiology F/U and decision on safety of spinal surgery (with general anesthesia). I'll speak with Nacho again about other options for a home-care bed and mattress. Prevalon boots for feet at all times. Home treatment: good handwashing before and after any dressing changes. Cleanse wound with saline or soap & water before dressing change. May use Vaseline (petrolatum), Aquaphor, Aveeno, CeraVe, Cetaphil, Eucerin, Lubriderm, etc for dry skin.      Dressing type for home: Other: generally as above, daily (though right foot periwound can get Triad;

## 2017-12-06 ENCOUNTER — HOSPITAL ENCOUNTER (OUTPATIENT)
Dept: WOUND CARE | Age: 52
Discharge: HOME | End: 2017-12-06
Attending: INTERNAL MEDICINE | Admitting: INTERNAL MEDICINE

## 2017-12-06 VITALS
HEIGHT: 74 IN | RESPIRATION RATE: 17 BRPM | SYSTOLIC BLOOD PRESSURE: 112 MMHG | DIASTOLIC BLOOD PRESSURE: 67 MMHG | TEMPERATURE: 98.2 F | HEART RATE: 90 BPM

## 2017-12-06 LAB
ANION GAP SERPL CALCULATED.3IONS-SCNC: 14 MMOL/L (ref 3–16)
BUN BLDV-MCNC: 22 MG/DL (ref 7–20)
CALCIUM SERPL-MCNC: 9.2 MG/DL (ref 8.3–10.6)
CHLORIDE BLD-SCNC: 94 MMOL/L (ref 99–110)
CO2: 25 MMOL/L (ref 21–32)
CREAT SERPL-MCNC: 0.7 MG/DL (ref 0.9–1.3)
GFR AFRICAN AMERICAN: >60
GFR NON-AFRICAN AMERICAN: >60
GLUCOSE BLD-MCNC: 114 MG/DL (ref 70–99)
MAGNESIUM: 1.6 MG/DL (ref 1.8–2.4)
POTASSIUM SERPL-SCNC: 4.5 MMOL/L (ref 3.5–5.1)
SODIUM BLD-SCNC: 133 MMOL/L (ref 136–145)

## 2017-12-06 PROCEDURE — 97597 DBRDMT OPN WND 1ST 20 CM/<: CPT | Performed by: INTERNAL MEDICINE

## 2017-12-06 PROCEDURE — 11043 DBRDMT MUSC&/FSCA 1ST 20/<: CPT | Performed by: INTERNAL MEDICINE

## 2017-12-06 PROCEDURE — 97598 DBRDMT OPN WND ADDL 20CM/<: CPT | Performed by: INTERNAL MEDICINE

## 2017-12-06 PROCEDURE — 11046 DBRDMT MUSC&/FSCA EA ADDL: CPT | Performed by: INTERNAL MEDICINE

## 2017-12-06 ASSESSMENT — PAIN DESCRIPTION - ORIENTATION: ORIENTATION: MID

## 2017-12-06 ASSESSMENT — PAIN DESCRIPTION - PROGRESSION: CLINICAL_PROGRESSION: GRADUALLY WORSENING

## 2017-12-06 ASSESSMENT — PAIN DESCRIPTION - LOCATION: LOCATION: NECK

## 2017-12-06 ASSESSMENT — PAIN DESCRIPTION - DESCRIPTORS: DESCRIPTORS: ACHING

## 2017-12-06 ASSESSMENT — PAIN DESCRIPTION - FREQUENCY: FREQUENCY: CONTINUOUS

## 2017-12-06 ASSESSMENT — PAIN DESCRIPTION - ONSET: ONSET: ON-GOING

## 2017-12-06 ASSESSMENT — PAIN SCALES - GENERAL: PAINLEVEL_OUTOF10: 3

## 2017-12-06 ASSESSMENT — PAIN DESCRIPTION - PAIN TYPE: TYPE: CHRONIC PAIN

## 2017-12-07 NOTE — PLAN OF CARE
Problem: Wound:  Goal: Will show signs of wound healing; wound closure and no evidence of infection  Will show signs of wound healing; wound closure and no evidence of infection   Outcome: Ongoing  Right lateral foot wound stable with some improvement noted, debridement per Dr. Pedro Luis Narvaez, will cont. With Triad & Aq Ag as ordered. Left lateral foot wound stable, no debridement, awaiting cardiac clearance for Dr. Giovany Ruelas to move forward with surgery. Left lateral leg wound also stable, debridement per Dr. Cipriano Salinas. With Triad & AqAg as ordered. Bilateral heels stable, debridement per Dr Cipriano Salinas. With betadine & heel protectors. Rt. Hip stable, no changes or debridement, cont. With Iodoform as ordered. Rt. Ischial wound showing some improvement this week, debridement per Dr Pedro Luis Narvaez, cont. With Triad & AqAg, gauze bolster as ordered. Thoracic back wound with large amount of necrotic tissue noted, debridement per Dr. Pedro Luis Narvaez. Hardware exposure remains & awaiting cardiac clearance for further surgery. Still awaiting further from [address] [place] [address] re: off-loading in his power chair. Also Dr. Pedro Luis Narvaez continuing to work on getting in touch with a rep. From [facility] to try & get a bed at home to help with off-loading. Pt. Is using the tortoise repositioning device at home & states that it does help better than the wedge he had used in the past. Reinforced importance of frequent repositioning & avoid sitting as much as possible. F/u in [facility] in 1 week as ordered. Discharge instructions reviewed with patient, all questions answered, copy given to patient. Dressings were applied to all wounds per M.D. Instructions at this visit.

## 2017-12-13 ENCOUNTER — HOSPITAL ENCOUNTER (OUTPATIENT)
Dept: WOUND CARE | Age: 52
Discharge: HOME | End: 2017-12-13
Attending: INTERNAL MEDICINE | Admitting: INTERNAL MEDICINE

## 2017-12-13 VITALS
RESPIRATION RATE: 16 BRPM | HEART RATE: 100 BPM | TEMPERATURE: 97.1 F | WEIGHT: 271.5 LBS | BODY MASS INDEX: 34.84 KG/M2 | HEIGHT: 74 IN | SYSTOLIC BLOOD PRESSURE: 123 MMHG | DIASTOLIC BLOOD PRESSURE: 72 MMHG

## 2017-12-13 LAB
GLUCOSE BLD-MCNC: 66 MG/DL (ref 70–99)
GLUCOSE BLD-MCNC: 73 MG/DL (ref 70–99)
PERFORMED ON: ABNORMAL
PERFORMED ON: NORMAL

## 2017-12-13 PROCEDURE — 11046 DBRDMT MUSC&/FSCA EA ADDL: CPT | Performed by: INTERNAL MEDICINE

## 2017-12-13 PROCEDURE — 11043 DBRDMT MUSC&/FSCA 1ST 20/<: CPT | Performed by: INTERNAL MEDICINE

## 2017-12-13 PROCEDURE — 17250 CHEM CAUT OF GRANLTJ TISSUE: CPT | Performed by: INTERNAL MEDICINE

## 2017-12-13 PROCEDURE — 97597 DBRDMT OPN WND 1ST 20 CM/<: CPT | Performed by: INTERNAL MEDICINE

## 2017-12-13 ASSESSMENT — PAIN SCALES - GENERAL: PAINLEVEL_OUTOF10: 3

## 2017-12-13 ASSESSMENT — PAIN DESCRIPTION - PAIN TYPE: TYPE: CHRONIC PAIN

## 2017-12-13 ASSESSMENT — PAIN DESCRIPTION - ONSET: ONSET: ON-GOING

## 2017-12-13 ASSESSMENT — PAIN DESCRIPTION - FREQUENCY: FREQUENCY: CONTINUOUS

## 2017-12-13 ASSESSMENT — PAIN DESCRIPTION - DESCRIPTORS: DESCRIPTORS: ACHING

## 2017-12-13 ASSESSMENT — PAIN DESCRIPTION - PROGRESSION: CLINICAL_PROGRESSION: NOT CHANGED

## 2017-12-13 ASSESSMENT — PAIN DESCRIPTION - ORIENTATION: ORIENTATION: MID

## 2017-12-13 ASSESSMENT — PAIN DESCRIPTION - LOCATION: LOCATION: NECK;SHOULDER

## 2017-12-13 NOTE — PROGRESS NOTES
Patient getting ready to transfer to wheelchair and stated he had a headache and though his BS was low. Blood glucose checked and was 66. Patient drank [employer] Chaparrita and states he was feeling better shortly after drinking. MD aware and patient waiting in room with door open in his wheelchair for 10 min. Then was esc cored home via his home care aide.

## 2017-12-14 PROBLEM — L89.610 PRESSURE ULCER OF RIGHT HEEL, UNSTAGEABLE (HCC): Status: ACTIVE | Noted: 2017-12-05

## 2017-12-15 RX ORDER — SULFAMETHOXAZOLE AND TRIMETHOPRIM 200; 40 MG/5ML; MG/5ML
SUSPENSION ORAL
Qty: 40 ML | Refills: 0 | Status: SHIPPED | OUTPATIENT
Start: 2017-12-15 | End: 2017-12-20 | Stop reason: DRUGHIGH

## 2017-12-15 NOTE — PROGRESS NOTES
TempSrc: Oral   Weight: Comment: unable to take   Height: 6' 2\" (1.88 m)     AAOx3, overweight, fatigued, pale, NAD  Dopplerable distal pulses, feet warm, good cap refill  No cellulitis, angitis or fluctuance; no definite acute arthritis  Still moderate LE edema in general (milder at ankles, more severe at right thigh)  [place]-ulcer skin: generally pink and indurated, much less dusky at T-spine wound, mild erythema at left lateral leg, mild hyperkeratosis at heels, mild erythema at right heel too. Ulcer(s): T-spine wound with some muscle and fat layer necrosis and slough, considerable exposed hardware, fibrinous debris, soft hypergranulation, one area of exposed bone; buttock ulcer part granular, part fibrotic, overlying fibrin, less friable; right hip stable small sinus tract; right foot peripherally granular, central fibronecrotic SQ and fascial/tendon tissue; right heel thin eschar and some skin necrosis; left heel large black eschar, mild peripheral loose skin necrosis; left foot stable deep wound with granular walls, slightly cloudy exudate; left leg a bit less inflamed, some granulation centrally, fibronecrotic muscle proximally, small length of exposed bone, overlying fibrinous debris. Pre-debridement wound measurements are in the wound documentation flowsheet. Photos also saved in electronic chart. 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 7XXA    Iron deficiency anemia D50.9    Lymphedema of both lower extremities I89.0    Neurogenic bladder N31.9    Neurogenic bowel K59.2    Pressure ulcer of right buttock, stage 4 ([facility]) L89.314    Hypergranulation L92.9    Dehiscence of surgical wound of T-spine T81.31XA    Chronic ulcer of left lower leg with bone involvement without evidence of necrosis L97.926    Onychomycosis B35.1    Dystrophic nail L60.3    Diabetic ulcer of left midfoot associated with type 2 diabetes mellitus, with necrosis of bone ([facility]) E11.621, L97.424    Peripheral venous insufficiency I87.2    Ischemic cardiomyopathy I25.5    Chronic systolic congestive heart failure ([facility]) I50.22    Pressure ulcer of right foot, stage 4 ([facility]) L89.894    Pressure ulcer of right heel, stage 2 L89.612    Pressure ulcer of left heel, unstageable ([facility]) L89.620       Assessment of today's active condition(s): DM, paraplegia, multiple non-healing wounds related to pressure, DM, prior surgeries and chronic infections; no ischemia, no acute soft tissue infection (apart from perhaps the necrotic tissues in the back wound). I don't think soft tissue treatment and antibiotic suppression of the back wound will be very successful, but we can try it -- it will involve desensitization to Bactrim, once his K+ level is normal, and it would also be helpful if I could remove the two small horizontally oriented pieces of hardware, in terms of local care and offloading. Foot in need of surgery, which he plans to schedule once evaluated by cardiology. Factors contributing to occurrence and/or persistence of the chronic ulcer include lymphedema, diabetes, poor glucose control, chronic pressure, decreased mobility, shear force, obesity and decreased tissue oxygenation. Medical necessity of today's visit is shown by the above documentation. Sharp debridement is indicated today, based upon the exam findings in the wound(s) above. Procedure note:     Consent obtained. Time out performed per [facility] policy. Anesthetic  Anesthetic: None     Using a curette, scissors, forceps and # 10 blade scalpel, I sharply debrided parts of the T-spine, right foot and left leg ulcer(s) down through and including the removal of muscle/fascia. The type(s) of tissue debrided included fibrin, biofilm, slough and necrotic/eschar. Total Surface Area Debrided: approx 50 sq cm. Using a curette, I sharply debrided parts of the right buttock, right heel and left heel ulcer(s) down through and including the removal of dermis. The type(s) of tissue debrided included fibrin, biofilm and necrotic/eschar. Total Surface Area Debrided: approx 30 sq cm.     Post  Debridement Measurements:  Wound 11/15/17 #31- R heel, pressure, Stage 2, onset 11/15/17, pressure-Wound Length (cm): 4.1 cm  Wound 10/25/17 #29, right lateral foot cluster, pressure stage 4, onset 10/25/17, pressure-Wound Length (cm): 8 cm  Wound 06/07/17 #24 Left lateral lower leg-cluster, Venous, full thickness,  (onset  6/5/17) unknown-Wound Length (cm): 14 cm  Wound 02/13/17 #22 left lateral foot, Mcca[place]y 3 DFU, onset 2/2017, surgical dehiscence -Wound Length (cm): 1.3 cm  Wound 08/16/17 #27- Thoracic spine, dehisced surgical wound, full thickness, onset 8/16/2017, pressure-Wound Length (cm): 10.5 cm  Wound 10/17/14  #8, right ischium, stage 4 PU, onset 7/15/14 (merged with #30)-Wound Length (cm): 6.5 cm    Wound 11/15/17 #31- R heel, pressure, Stage 2, onset 11/15/17, pressure-Wound Width (cm): 0.8 cm  Wound 10/25/17 #29, right lateral foot cluster, pressure stage 4, onset 10/25/17, pressure-Wound Width (cm): 4.5 cm  Wound 06/07/17 #24 Left lateral lower leg-cluster, Venous, full thickness,  (onset  6/5/17) unknown-Wound Width (cm): 1.3 cm  Wound 02/13/17 #22 left lateral foot, Mccauley 3 DFU, onset 2/2017, surgical dehiscence -Wound Width (cm): 0.3 cm  Wound 08/16/17 #27- Thoracic spine, dehisced surgical wound, full thickness, onset 8/16/2017, pressure-Wound Width (cm): 9 cm  Wound 10/17/14  #8, right ischium, stage 4 PU, onset 7/15/14 (merged with #30)-Wound Width (cm): 3 cm    Wound 11/15/17 #31- R heel, pressure, Stage 2, onset 11/15/17, pressure-Wound Depth (cm) : 0.1  Wound 10/25/17 #29, right lateral foot cluster, pressure stage 4, onset 10/25/17, pressure-Wound Depth (cm) : 0.6  Wound 06/07/17 #24 Left lateral lower leg-cluster, Venous, full thickness,  (onset  6/5/17) unknown-Wound Depth (cm) : 0.8  Wound 02/13/17 #22 left lateral foot, Mcca[place]y 3 DFU, onset 2/2017, surgical dehiscence -Wound Depth (cm) : 3.8  Wound 08/16/17 #27- Thoracic spine, dehisced surgical wound, full thickness, onset 8/16/2017, pressure-Wound Depth (cm) : 1.8  Wound 10/17/14  #8, right ischium, stage 4 PU, onset 7/15/14 (merged with #30)-Wound Depth (cm) : 2.4     (note -- I'm not sure why the left heel and right hip ulcer measurements are not present here. Both are very similar to last week, perhaps the left heel just a bit larger). The ulcers were then irrigated with normal saline solution. The procedure was completed with a moderate amount of bleeding, and hemostasis was by pressure and by silver nitrate stick. The patient tolerated the procedure well, with no significant complications. The patient's level of pain during and after the procedure was monitored, and is noted in the wound documentation flowsheet.     Discharge plan:     Treatment in the wound care center today: Wound 11/15/17 #31- R heel, pressure, Stage 2, onset 11/15/17, pressure-Dressing/Treatment:  (Betadine,Tegaderm Heel Protector)  Wound 10/25/17 #29, right lateral foot cluster, pressure stage 4, onset 10/25/17, pressure-Dressing/Treatment:  (Triad, Aquacel Ag,gauze,ABD,Kerlix,dbl E Tubigrip)  Wound 06/07/17 #24 Left lateral lower leg-cluster, Venous, full thickness,  (onset  6/5/17) unknown-Dressing/Treatment:  (Triad, Aquacel Ag,gauze,ABD, Kerlix,dbl E Tubigrip)  Wound 02/13/17 #22 left lateral

## 2017-12-20 ENCOUNTER — HOSPITAL ENCOUNTER (OUTPATIENT)
Dept: WOUND CARE | Age: 52
Discharge: HOME | End: 2017-12-20
Attending: INTERNAL MEDICINE | Admitting: INTERNAL MEDICINE

## 2017-12-20 VITALS
SYSTOLIC BLOOD PRESSURE: 107 MMHG | HEIGHT: 74 IN | DIASTOLIC BLOOD PRESSURE: 70 MMHG | TEMPERATURE: 97.5 F | RESPIRATION RATE: 17 BRPM | HEART RATE: 75 BPM

## 2017-12-20 PROCEDURE — 11043 DBRDMT MUSC&/FSCA 1ST 20/<: CPT | Performed by: INTERNAL MEDICINE

## 2017-12-20 PROCEDURE — 97597 DBRDMT OPN WND 1ST 20 CM/<: CPT | Performed by: INTERNAL MEDICINE

## 2017-12-20 PROCEDURE — 11046 DBRDMT MUSC&/FSCA EA ADDL: CPT | Performed by: INTERNAL MEDICINE

## 2017-12-20 RX ORDER — SULFAMETHOXAZOLE AND TRIMETHOPRIM 800; 160 MG/1; MG/1
1 TABLET ORAL 2 TIMES DAILY
Qty: 60 TABLET | Refills: 5 | Status: ON HOLD | OUTPATIENT
Start: 2017-12-20 | End: 2018-05-09 | Stop reason: CLARIF

## 2017-12-20 ASSESSMENT — PAIN DESCRIPTION - ONSET: ONSET: ON-GOING

## 2017-12-20 ASSESSMENT — PAIN DESCRIPTION - DESCRIPTORS: DESCRIPTORS: ACHING

## 2017-12-20 ASSESSMENT — PAIN DESCRIPTION - PROGRESSION: CLINICAL_PROGRESSION: NOT CHANGED

## 2017-12-20 ASSESSMENT — PAIN DESCRIPTION - ORIENTATION: ORIENTATION: MID

## 2017-12-20 ASSESSMENT — PAIN DESCRIPTION - LOCATION: LOCATION: NECK;SHOULDER

## 2017-12-20 ASSESSMENT — PAIN SCALES - GENERAL: PAINLEVEL_OUTOF10: 6

## 2017-12-20 ASSESSMENT — PAIN DESCRIPTION - FREQUENCY: FREQUENCY: CONTINUOUS

## 2017-12-20 ASSESSMENT — PAIN DESCRIPTION - PAIN TYPE: TYPE: CHRONIC PAIN

## 2017-12-21 NOTE — PLAN OF CARE
Problem: Wound:  Goal: Will show signs of wound healing; wound closure and no evidence of infection  Will show signs of wound healing; wound closure and no evidence of infection   Outcome: Ongoing  Rt. & Left Heel wounds stable, no debridement, cont. With betadine as ordered. Using Prevlon boots for off-loading. Left lateral foot & Leg wounds stable, no debridement, cont. With current wound care regime, awaiting cardiac clearance for surgical intervention per Dr Sonali Chester. Rt. Lateral foot wound stable, debridement per Dr. Grabiel Avendaño, tunnels remain as marked, cont. With Iodoform into tunnels, then Triad & AqAg as ordered. Rt. Hip stable, no debridement, cont. Iodoform as ordered. Rt. Ischial wound stable with slight improvement this week, debridement per Dr. Jorden Mcclendon. With current wound care regime. Back wound with necrotic tissue & debrided per Dr. Jorden Mcclendon. With current wound care regime. Dr Grabiel Avendaño talked with pt. & Pt. Agreeable to have Pamula Lesch with [facility] work with him to potentially try to order a bariatric ATILIO. Patient states the problem in the past was the bed wasn't wide enough for him & with also difficulties when using his prateek lift, with the previous bed & mattress. Will explore further options for off-loading with Tabatha Avendaño started on Bactrim & pt. To cont. As ordered. Pt. To f/u in [address] [place] [address],[address] in 1 week as ordered. Discharge instructions reviewed with patient, all questions answered, copy given to patient. Dressings were applied to all wounds per M.D. Instructions at this visit.

## 2017-12-21 NOTE — PROGRESS NOTES
[facility] Progress Note    Aleksey Cobb     : 1965    DATE OF VISIT:  2017    Subjective:     Aleksey Cobb is a 50 y.o. male who has a pressure ulcer located on the right ischium, right hip, right lateral foot, B/L heels. Significant symptoms or pertinent wound history since last visit: feeling ok in general. No F/C/D, appetite fair. Still a significant amount of malodor and drainage from back wound. Doing his best with offloading. I've not heard back from [facility] about new options for a bed and mattress for home. Following up with heart failure team, then to consider foot surgery in .     Additional ulcer(s) noted? yes. Back surgical wound, diabetic / surgical wound of left foot, diabetic / venous wound of left leg. His current medication list consists of Alcohol Wipes, Insulin Pen Needle, Insulin Syringe-Needle U-100, Lancets, apixaban, aspirin, clopidogrel, cyclobenzaprine, docusate sodium, ferrous sulfate, glucose blood VI test strips, insulin aspart, insulin detemir, lisinopril, loratadine, magnesium oxide, metFORMIN, metoprolol succinate, metroNIDAZOLE, nystatin, oxyCODONE, pantoprazole, pravastatin, promethazine, senna, sulfamethoxazole-trimethoprim, torsemide, and traZODone. Allergies: Benadryl [diphenhydramine hcl]; Statins [statins];  Cephalexin; Morphine; and Sulfa antibiotics    Objective:     Vitals:    17 1044   BP: 123/72   Pulse: 100   Resp: 16   Temp: 97.1 °F (36.2 °C)   TempSrc: Oral   Weight: 271 lb 8 oz (123.2 kg)   Height: 6' 2\" (1.88 m)     AAOx3, overweight, fatigued, not quite as pale, NAD  No apparent dyspnea on mild exertion  Intact distal pulses, feet warm, good cap refill  No cellulitis, angitis, fluctuance or obvious acute arthritis  Generally moderate LE stage 2 lymphedema (most severe in right thigh, least in ankles)  [place]hetna-ulcer skin: generally pink and indurated (mild erythema at mid-back, mild maceration at ischium, mild hyperkeratosis at heels). Ulcer(s): mid back with peripheral SQ and muscle necrosis, central pink hypergranulation with fibrinous exudate, exposed hardware, moderate to heavy seropurulent exudate; right ischium part granular with fibrin, part hypergranular and friable; right hip stable small sinus tract; right heel partly stable black eschar, partly peripheral lysing skin necrosis and mild inflammation; right foot peripherally granular with fibrin, centrally SQ and fascial / tendon necrosis, improving, but with tunnels up along the proximal part of the peroneal tendon bodies; left heel a relatively stable black eschar; left foot deep granular wound, presumed fibronecrotic at base, cloudy and bloody exudate; left leg part granular and friable, with overlying fibrin, a good bit of biofilm, part tendon and muscle necrosis, small area of SQ necrosis proximal, very tiny area of exposed bone, no pus. Pre-debridement wound measurements are in the wound documentation flowsheet. Photos also saved in electronic chart. 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 7XXA    Iron deficiency anemia D50.9    Lymphedema of both lower extremities I89.0    Neurogenic bladder N31.9    Neurogenic bowel K59.2    Pressure ulcer of right buttock, stage 4 ([facility]) L89.314    Hypergranulation L92.9    Dehiscence of surgical wound of T-spine T81.31XA    Chronic ulcer of left lower leg with bone involvement without evidence of necrosis L97.926    Onychomycosis B35.1    Dystrophic nail L60.3    Diabetic ulcer of left midfoot associated with type 2 diabetes mellitus, with necrosis of bone ([facility]) E11.621, L97.424    Peripheral venous insufficiency I87.2    Ischemic cardiomyopathy I25.5    Chronic systolic congestive heart failure ([facility]) I50.22    Pressure ulcer of right foot, stage 4 ([facility]) L89.894    Pressure ulcer of right heel, unstageable ([facility]) L89.610    Pressure ulcer of left heel, unstageable ([facility]) L89.620    Pressure ulcer of left heel, unstageable ([facility]) L89.620       Assessment of today's active condition(s): DM2, paraplegia, multiple pressure ulcers, other wounds (back, left leg, left foot) related to prior and in some cases persistent infection, prior surgeries, DM and lymphedema; no spreading soft tissue infection. Surgical plans for left foot and back on hold until felt safer by cardiology. Offloading better than in recent months, but I'd still like to try to get him into a better bed and mattress. Factors contributing to occurrence and/or persistence of the chronic ulcer include lymphedema, diabetes, chronic pressure, decreased mobility, shear force, obesity and decreased tissue oxygenation. Medical necessity of today's visit is shown by the above documentation. Sharp debridement is indicated today, based upon the exam findings in the wound(s) above. Procedure note:     Consent obtained. Time out performed per [facility] policy. Anesthetic  Anesthetic: None     Using a curette, #15 blade scalpel, scissors and forceps, I sharply debrided the right foot and left leg ulcer(s) down through and including the removal of muscle/fascia. The type(s) of tissue debrided included fibrin, biofilm, slough and necrotic/eschar. Total Surface Area Debrided: approx 50 sq cm.  I also attempted to debride SQ and muscle tissue from the left lateral margin of the spine wound, but upon first incision there, I encountered a moderate amount of bleeding, so abandoned debridement there. Using a curette, #15 blade scalpel and forceps, I sharply debrided the right ischium, mid-back and right heel ulcer(s) down through and including the removal of dermis. The type(s) of tissue debrided included fibrin, necrotic/eschar and exudate. Total Surface Area Debrided: approx 20 sq cm.     Post  Debridement Measurements:  Wound 11/15/17 #31- R heel, pressure, Stage 2, onset 11/15/17, pressure-Wound Length (cm): 4 cm  Wound 10/25/17 #29, right lateral foot cluster, pressure stage 4, onset 10/25/17, pressure-Wound Length (cm): 8.7 cm  Wound 08/16/17 #27- Thoracic spine, dehisced surgical wound, full thickness, onset 8/16/2017, pressure-Wound Length (cm): 10.5 cm  Wound 06/07/17 #24 Left lateral lower leg-cluster, Venous, full thickness,  (onset  6/5/17) unknown-Wound Length (cm): 14.2 cm  Wound 02/13/17 #22 left lateral foot, Mccauley 3 DFU, onset 2/2017, surgical dehiscence -Wound Length (cm): 1.4 cm  Wound 10/17/14  #8, right ischium, stage 4 PU, onset 7/15/14 (merged with #30)-Wound Length (cm): 8.5 cm    Wound 11/15/17 #31- R heel, pressure, Stage 2, onset 11/15/17, pressure-Wound Width (cm): 2.4 cm  Wound 10/25/17 #29, right lateral foot cluster, pressure stage 4, onset 10/25/17, pressure-Wound Width (cm): 4.6 cm  Wound 08/16/17 #27- Thoracic spine, dehisced surgical wound, full thickness, onset 8/16/2017, pressure-Wound Width (cm): 7 cm  Wound 06/07/17 #24 Left lateral lower leg-cluster, Venous, full thickness,  (onset  6/5/17) unknown-Wound Width (cm): 1.5 cm  Wound 02/13/17 #22 left lateral foot, Mccauley 3 DFU, onset 2/2017, surgical dehiscence -Wound Width (cm): 0.3 cm  Wound 10/17/14  #8, right ischium, stage 4 PU, onset 7/15/14 (merged with #30)-Wound Width (cm): 3 cm    Wound 11/15/17 #31- R heel, pressure, Stage 2, onset 11/15/17, pressure-Wound Depth (cm) : 0.2  Wound 10/25/17 #29, right lateral foot cluster, dressing, 4x4 (kerlix, double e tubigrip)  Wound 02/13/17 #22 left lateral foot, Mccauley 3 DFU, onset 2/2017, surgical dehiscence -Dressing/Treatment: Dry dressing, Protective barrier, Silver dressing, 4x4 (kerlix, double -e tubi)  Wound 10/17/14  #8, right ischium, stage 4 PU, onset 7/15/14 (merged with #30)-Dressing/Treatment:  (Triad, Aquacel Ag,, gauze bolster, ABD). Await cardiology F/U re: safety of anesthesia for OR (though I think he'll need light sedation only for the left foot and leg surgeries). Last two potassium levels were finally normal (4.5 and 4.8, without KCl, Aldactone or lisinopril on board). I spoke to the heart failure NP this week, and they are considering one more trial of an ACE inhibitor, but if not tolerated, hydralazine and nitrates. Continue strong focus on offloading, protein intake, glucose control, hydration. Continue Oc BID, if financially feasible. Next week plan to remove the two cross-brackets of the spinal hardware, as they are causing discomfort while sitting in his chair, one could puncture an air mattress if the dressing slips off, one is repeatedly traumatizing the superior undermined area of the wound, and they're also just getting a bit in the way of wound cleansing and dressing. These brackets only connect one lindsay to the other, and do not anchor into his spine. Spoke with the [employer] rep, and he'll drop off the two necessary screwdrivers next week. In terms of the spinal infection, I don't think a long course of IV Abx would be a good choice until (and if) we get ALL of the hardware out, and do some more bone debridement. In the meantime, Dakins and silver aren't doing enough on their own, he doesn't tolerate doxycycline or Augmentin (severe diarrhea), and no other orals cover the previously isolated organisms apart from Bactrim, to which he's had an allergic reaction in the past (hives, no angioedema or anaphylaxis), and which can increase K+ levels. Discussed this with cardiology, and they're aware that we'll need to continue to watch his K+ level closely, and also that there could be another reason for hyperkalemia, if it recurs. I'll call in a script for Bactrim suspension today, with instructions on how to go from a very small dose to a full adult dose within about 6 - 7 days. Because he's had anaphylaxis from Benadryl (!), I advised him to  some OTC Pepcid and Zyrtec, and if he starts to develop allergic symptoms, stop Bactrim, call me, and take a double dose (twice the recommended dose on the package) of each. If severe symptoms, which seems very unlikely, obviously call 911. If tolerated this week, I'll write a script for Bactrim tabs for the long-term. He's getting a K+ level next Thursday again. Since [facility]'t been able to help out with a new bed for his home, I'll talk with [facility] again, and see what the prospects are for getting him a bariatric bed, with greater width being the main goal. Continue to use [employer] Tortoise device on top of his memory foam mattress for now. Naboro in good repair. [facility] working on a new motorized chair for him. Home treatment: good handwashing before and after any dressing changes. Cleanse wound with saline or soap & water before dressing change. May use Vaseline (petrolatum), Aquaphor, Aveeno, CeraVe, Cetaphil, Eucerin, Lubriderm, etc for dry skin. Dressing type for home: Other: basically as above, daily (with the iodoform at the right foot into the two marked tunnels, Flagyl powder as needed for malodor at the mid-back, Dakins soak to the mid-back, right ischium and left leg before dressings). Written discharge instructions given to patient. Follow up in 1 week.     Electronically signed by Lauren Rosales MD on 12/20/2017 at 10:13 PM.

## 2017-12-29 DIAGNOSIS — K59.01 CONSTIPATION BY DELAYED COLONIC TRANSIT: ICD-10-CM

## 2017-12-29 NOTE — TELEPHONE ENCOUNTER
LOV 1/19/17    Future Appointments  Date Time Provider Alyson Cardona   1/3/2018 10:30 AM Rosendo Reynaga MD [facility] None

## 2017-12-31 RX ORDER — DOCUSATE SODIUM 100 MG/1
CAPSULE ORAL
Qty: 180 CAPSULE | Refills: 1 | Status: SHIPPED | OUTPATIENT
Start: 2017-12-31 | End: 2018-08-08 | Stop reason: SDUPTHER

## 2018-01-01 ENCOUNTER — HOSPITAL ENCOUNTER (OUTPATIENT)
Dept: OTHER | Age: 53
Discharge: HOME | End: 2018-01-31
Attending: INTERNAL MEDICINE | Admitting: INTERNAL MEDICINE

## 2018-01-03 ENCOUNTER — HOSPITAL ENCOUNTER (OUTPATIENT)
Dept: WOUND CARE | Age: 53
Discharge: HOME | End: 2018-01-03
Attending: INTERNAL MEDICINE | Admitting: INTERNAL MEDICINE

## 2018-01-03 VITALS
RESPIRATION RATE: 18 BRPM | TEMPERATURE: 98.1 F | SYSTOLIC BLOOD PRESSURE: 106 MMHG | DIASTOLIC BLOOD PRESSURE: 66 MMHG | HEART RATE: 73 BPM

## 2018-01-03 DIAGNOSIS — D63.8 ANEMIA OF CHRONIC DISORDER: ICD-10-CM

## 2018-01-03 LAB
ANION GAP SERPL CALCULATED.3IONS-SCNC: 21 MMOL/L (ref 3–16)
BUN BLDV-MCNC: 31 MG/DL (ref 7–20)
CALCIUM SERPL-MCNC: 8.8 MG/DL (ref 8.3–10.6)
CHLORIDE BLD-SCNC: 96 MMOL/L (ref 99–110)
CO2: 21 MMOL/L (ref 21–32)
CREAT SERPL-MCNC: 1 MG/DL (ref 0.9–1.3)
GFR AFRICAN AMERICAN: >60
GFR NON-AFRICAN AMERICAN: >60
GLUCOSE BLD-MCNC: 138 MG/DL (ref 70–99)
POTASSIUM SERPL-SCNC: 4.6 MMOL/L (ref 3.5–5.1)
SODIUM BLD-SCNC: 138 MMOL/L (ref 136–145)

## 2018-01-03 PROCEDURE — 97607 NEG PRS WND THR NDME<=50SQCM: CPT | Performed by: INTERNAL MEDICINE

## 2018-01-03 PROCEDURE — 11046 DBRDMT MUSC&/FSCA EA ADDL: CPT | Performed by: INTERNAL MEDICINE

## 2018-01-03 PROCEDURE — 11043 DBRDMT MUSC&/FSCA 1ST 20/<: CPT | Performed by: INTERNAL MEDICINE

## 2018-01-03 PROCEDURE — 99213 OFFICE O/P EST LOW 20 MIN: CPT | Performed by: INTERNAL MEDICINE

## 2018-01-03 RX ORDER — BENZONATATE 100 MG/1
100-200 CAPSULE ORAL 3 TIMES DAILY PRN
Qty: 90 CAPSULE | Refills: 1 | Status: ON HOLD | OUTPATIENT
Start: 2018-01-03 | End: 2018-03-07 | Stop reason: HOSPADM

## 2018-01-03 ASSESSMENT — PAIN DESCRIPTION - ONSET: ONSET: ON-GOING

## 2018-01-03 ASSESSMENT — PAIN SCALES - GENERAL: PAINLEVEL_OUTOF10: 3

## 2018-01-03 ASSESSMENT — PAIN DESCRIPTION - LOCATION: LOCATION: NECK;SHOULDER

## 2018-01-03 ASSESSMENT — PAIN DESCRIPTION - ORIENTATION: ORIENTATION: LEFT;MID

## 2018-01-03 ASSESSMENT — PAIN DESCRIPTION - PROGRESSION: CLINICAL_PROGRESSION: NOT CHANGED

## 2018-01-03 ASSESSMENT — PAIN DESCRIPTION - DESCRIPTORS: DESCRIPTORS: ACHING

## 2018-01-03 ASSESSMENT — PAIN DESCRIPTION - FREQUENCY: FREQUENCY: CONTINUOUS

## 2018-01-03 ASSESSMENT — PAIN DESCRIPTION - PAIN TYPE: TYPE: CHRONIC PAIN

## 2018-01-04 PROBLEM — L89.620 PRESSURE ULCER OF LEFT HEEL, UNSTAGEABLE (HCC): Status: RESOLVED | Noted: 2017-12-05 | Resolved: 2018-01-04

## 2018-01-04 RX ORDER — FERROUS SULFATE 325(65) MG
TABLET ORAL
Qty: 270 TABLET | Refills: 0 | Status: SHIPPED | OUTPATIENT
Start: 2018-01-04 | End: 2018-04-23 | Stop reason: SDUPTHER

## 2018-01-04 NOTE — PROGRESS NOTES
T-spine, fading ecchymosis at right buttock, mild ecchymosis and hyperkeratosis at R>L heel, mild maceration left leg. Ulcer(s): T-spine with much hardware exposed, a good deal of pink inflamed granulation tissue, fibrinous debris, still some deep muscle and fascial necrosis, and skin and SQ necrosis along left edge and within undermining; right buttock mostly red-based, a bit of mature granulation, overlying fibrin and biofilm; right hip with sinus tract seemingly closing in; right heel mostly stable black eschar, focally soft; right foot part granular, part deep soft tissue slough and necrosis, including peroneal tendon into proximal tunnel; left foot stable deep sinus tract with cloudy drainage; left heel stable black eschar with just the start of lysis at some edges; left leg with less exposed fibula, some granulation and hypergranulation, a bit of exposed proximal tendon, some fibrin. Pre-debridement wound measurements are in the wound documentation flowsheet. Photos also saved in electronic chart. 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 7XXA    Iron deficiency anemia D50.9    Lymphedema of both lower extremities I89.0    Neurogenic bladder N31.9    Neurogenic bowel K59.2    Pressure ulcer of right buttock, stage 4 ([facility]) L89.314    Dehiscence of surgical wound of T-spine T81.31XA    Chronic ulcer of left lower leg with bone involvement without evidence of necrosis L97.926    Onychomycosis B35.1    Dystrophic nail L60.3    Diabetic ulcer of left midfoot associated with type 2 diabetes mellitus, with necrosis of bone ([facility]) E11.621, L97.424    Peripheral venous insufficiency I87.2    Ischemic cardiomyopathy I25.5    Chronic systolic congestive heart failure ([facility]) I50.22    Pressure ulcer of right foot, stage 4 ([facility]) L89.894    Pressure ulcer of right heel, unstageable ([facility]) L89.610    Pressure ulcer of left heel, unstageable ([facility]) L89.620       Assessment of today's active condition(s): DM, paraplegia, multiple nonhealing ulcers related to pressure, DM, LE edema and prior surgeries, with chronic osteomyelitis presumed at T-spine and left foot, possibly at left leg; no acute soft tissue infection, but hopefully the Bactrim therapy will help some inflammation within the back wound at least. Factors contributing to occurrence and/or persistence of the chronic ulcer include venous stasis, lymphedema, diabetes, chronic pressure, decreased mobility, shear force, obesity and decreased tissue oxygenation. Medical necessity of today's visit is shown by the above documentation. Sharp debridement is indicated today, based upon the exam findings in the wound(s) above. Procedure note:     Consent obtained. Time out performed per [facility] policy. Anesthetic  Anesthetic: None     Using a curette, I sharply debrided the buttocks ulcer(s) down through and including the removal of dermis. The type(s) of tissue debrided included fibrin, biofilm and exudate. Total Surface Area Debrided: 10 sq cm. Using a curette, scissors, forceps and # 10 blade scalpel, I sharply debrided the back and right lateral foot ulcer(s) down through and including the removal of muscle/fascia. The type(s) of tissue debrided included fibrin, slough and necrotic/eschar. Total Surface Area Debrided: approx 70 sq cm.      Also, with the tools generously loaned to us by [employer], I successfully removed the cross-braces within his T-spine instrumentation, partly to prevent the inferior one from traumatizing his mattress, and even more to prevent the superior one from traumatizing the wound tissue within the undermining; all screws removed without any difficulty, and with just one (superior, right side) removed with a bit of bleeding, given the fragile hypergranulation around the lindsay there.      Post  Debridement Measurements:  Wound 11/15/17 #31- R heel, pressure, Stage 2, onset 11/15/17, pressure-Wound Length (cm): 2.2 cm  Wound 02/13/17 #22 left lateral foot, Mccauley 3 DFU, onset 2/2017, surgical dehiscence -Wound Length (cm): 1.5 cm  Wound 10/25/17 #29, right lateral foot cluster, pressure stage 4, onset 10/25/17, pressure-Wound Length (cm): 9 cm  Wound 06/07/17 #24 Left lateral lower leg-cluster, Venous, full thickness,  (onset  6/5/17) unknown-Wound Length (cm): 14 cm  Wound 08/16/17 #27- Thoracic spine, dehisced surgical wound, full thickness, onset 8/16/2017, pressure-Wound Length (cm): 10.9 cm  Wound 10/17/14  #8, right ischium, stage 4 PU, onset 7/15/14 (merged with #30)-Wound Length (cm): 8.5 cm    Wound 11/15/17 #31- R heel, pressure, Stage 2, onset 11/15/17, pressure-Wound Width (cm): 2.5 cm  Wound 02/13/17 #22 left lateral foot, Mccauley 3 DFU, onset 2/2017, surgical dehiscence -Wound Width (cm): 0.2 cm  Wound 10/25/17 #29, right lateral foot cluster, pressure stage 4, onset 10/25/17, pressure-Wound Width (cm): 4.5 cm  Wound 06/07/17 #24 Left lateral lower leg-cluster, Venous, full thickness,  (onset  6/5/17) unknown-Wound Width (cm): 1.5 cm  Wound 08/16/17 #27- Thoracic spine, dehisced surgical wound, full thickness, onset 8/16/2017, pressure-Wound Width (cm): 7.5 cm  Wound 10/17/14  #8, right ischium, stage 4 PU, onset 7/15/14 (merged with #30)-Wound Width (cm): 1.8 cm    Wound 11/15/17 #31- R heel, pressure, Stage 2, onset 11/15/17, pressure-Wound Depth (cm) : 0.1  Wound 02/13/17 #22 left lateral foot, Mccauley 3 DFU, onset 2/2017, surgical dehiscence -Wound Depth (cm) : 4  Wound 10/25/17 #29, right lateral foot cluster, pressure stage 4, onset 10/25/17, pressure-Wound Depth (cm) : 2  Wound 06/07/17 #24 Left lateral lower leg-cluster, Venous, full thickness,  (onset  6/5/17) unknown-Wound Depth (cm) : 0.7  Wound 08/16/17 #27- Thoracic spine, dehisced surgical wound, full thickness, onset 8/16/2017, pressure-Wound Depth (cm) : 2  Wound 10/17/14  #8, right ischium, stage 4 PU, onset 7/15/14 (merged with #30)-Wound Depth (cm) : 0.6    The ulcers were then irrigated with normal saline solution. The procedure was completed with a moderate amount of bleeding, and hemostasis was by pressure and by silver nitrate stick. The patient tolerated the procedure well, with no significant complications. The patient's level of pain during and after the procedure was monitored, and is noted in the wound documentation flowsheet. Discharge plan:     Treatment in the wound care center today: Wound 02/13/17 #22 left lateral foot, Mccauley 3 DFU, onset 2/2017, surgical dehiscence -Dressing/Treatment: Alginate, Dry dressing, Silver dressing, 4x4, Other (Comment) (Triad double E tubi)  Wound 06/07/17 #24 Left lateral lower leg-cluster, Venous, full thickness,  (onset  6/5/17) unknown-Dressing/Treatment: ABD, Alginate, Dry dressing, Silver dressing, Other (Comment) (Triad, double E tubi). See nursing flowsheet for other wound dressings. Finish Bactrim desensitization this week, then immediately transition to one DS tab BID. BMP next week. Continue focus on offloading, protein intake, hydration, glucose control. Will F/U with [facility] re: new chair. Will F/U with [facility] re: possibility of bariatric bed and low air loss mattress at home. After cardiology F/U, will discuss scheduling left foot (+/- leg) surgery with Dr. Nicki Quintanilla.   Will also speak with Dr. Rosa M Blount again about consideration for removal of all spine hardware, though I agree with him that that WOULD be a big undertaking (in terms of bleeding and cardiopulmonary risk). For ongoing LE compression, just Tubigrip for now -- tends to be simpler, and lead to less skin damage than more aggressive Rx. Prevalon boots at all times. Home treatment: good handwashing before and after any dressing changes. Cleanse wound with saline or soap & water before dressing change. May use Vaseline (petrolatum), Aquaphor, Aveeno, CeraVe, Cetaphil, Eucerin, Lubriderm, etc for dry skin. Dressing type for home: Other: right hip ZnO, iodoform and dry dressing TIW; everything else daily: mid-back Dakins, Flagyl, Triad, silver alginate, gauze, ABD; right ischium Dakins, ZnO, silver alginate, gauze bolster, ABD; right and left heel Betadine and heel protector dressings; right foot Dakins, Triad, iodoform into tunnels, silver alginate, gauze; left foot silver alginate and gauze; left leg Dakins, Triad, silver alginate, gauze. Written discharge instructions given to patient. Follow up in 1 week, unless he and his home care nurse are comfortable with how things are doing next week, in which case he can take a week off for the holiday, when I'll also be away.     Electronically signed by Juan Carlos Pablo MD on 1/4/2018 at 5:14 PM.

## 2018-01-05 PROCEDURE — G0179 MD RECERTIFICATION HHA PT: HCPCS | Performed by: INTERNAL MEDICINE

## 2018-01-07 PROBLEM — G82.20 PARAPLEGIA (HCC): Chronic | Status: ACTIVE | Noted: 2018-01-07

## 2018-01-07 PROBLEM — E11.9 DIABETES TYPE 2, CONTROLLED (HCC): Chronic | Status: RESOLVED | Noted: 2018-01-07 | Resolved: 2018-01-07

## 2018-01-07 PROBLEM — L89.90 DECUBITUS ULCER: Chronic | Status: RESOLVED | Noted: 2018-01-07 | Resolved: 2018-01-07

## 2018-01-07 PROBLEM — E87.6 GITELMAN SYNDROME: Chronic | Status: RESOLVED | Noted: 2018-01-07 | Resolved: 2018-01-07

## 2018-01-07 PROBLEM — E83.42 GITELMAN SYNDROME: Chronic | Status: ACTIVE | Noted: 2018-01-07

## 2018-01-07 PROBLEM — G82.20 PARAPLEGIA (HCC): Chronic | Status: RESOLVED | Noted: 2018-01-07 | Resolved: 2018-01-07

## 2018-01-07 PROBLEM — E11.9 DIABETES TYPE 2, CONTROLLED (HCC): Chronic | Status: ACTIVE | Noted: 2018-01-07

## 2018-01-07 PROBLEM — R06.02 SOB (SHORTNESS OF BREATH): Status: ACTIVE | Noted: 2018-01-07

## 2018-01-07 PROBLEM — R07.9 CHEST PAIN: Status: ACTIVE | Noted: 2018-01-07

## 2018-01-07 PROBLEM — D64.9 ANEMIA: Chronic | Status: ACTIVE | Noted: 2018-01-07

## 2018-01-07 PROBLEM — L89.90 DECUBITUS ULCER: Chronic | Status: ACTIVE | Noted: 2018-01-07

## 2018-01-07 PROBLEM — E83.42 GITELMAN SYNDROME: Chronic | Status: RESOLVED | Noted: 2018-01-07 | Resolved: 2018-01-07

## 2018-01-07 PROBLEM — E87.6 GITELMAN SYNDROME: Chronic | Status: ACTIVE | Noted: 2018-01-07

## 2018-01-08 NOTE — PROGRESS NOTES
decreased breath sounds at bases, no crackles, no wheezing  [place]hetna-ulcer skin: generally pink and indurated, mild erythema at mid-back, flaking hyperkeratosis at heels; skin at buttock/ischium is the best I've seen it in months. Ulcer(s): thoracic wound with some left sided fascial-muscle necrosis, and skin-SQ necrosis along the edge, approx stable undermining, more pink (hyper)granulation, significant hardware still visible, some fibrin and debris, no pus or malodor; part of right buttock ulcer red, granular, superficial and healthy, with the deeper portion still pink-red, not necrotic, but rather flat and stagnant; right hip sinus tract appears to be filling in; right heel mostly black eschar, partly soft, still one edge with ongoing superficial skin necrosis each week; right foot part granular, part fibrotic, bone thinly covered, some general sloughy material and SQ necrosis, fibrin, still some necrotic peroneal tendon into superior tunnel; left heel fairly stable and firm black eschar; left foot stable deep sinus tract with a bit of cloudy drainage; left leg smaller, more granular in the distal 80%, bone covered, proximal 20% with some puckering and exposed tendon/fascia, though not grossly necrotic. Pre-debridement wound measurements are in the wound documentation flowsheet. Photos also saved in electronic chart.     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 Time out performed per [facility] policy. Anesthetic  Anesthetic: None     Using a curette, scissors, forceps and # 10 blade scalpel, I sharply debrided the back and right foot ulcer(s) down through and including the removal of muscle/fascia. The type(s) of tissue debrided included fibrin, slough and necrotic/eschar. Total Surface Area Debrided: approx 50 total sq cm. (nearly all of the right lateral foot, and a small portion of the back).     Post  Debridement Measurements:  [REMOVED] Wound 02/13/17 #22 left lateral foot, Mccauley 3 DFU, onset 2/2017, surgical dehiscence -Wound Length (cm): 1.5 cm  [REMOVED] Wound 06/07/17 #24 Left lateral lower leg-cluster, Venous, full thickness,  (onset  6/5/17) unknown-Wound Length (cm): 14 cm  [REMOVED] Wound 11/15/17 #31- R heel, pressure, Stage 2, onset 11/15/17, pressure-Wound Length (cm): 6.5 cm  [REMOVED] Wound 10/25/17 #29, right lateral foot cluster, pressure stage 4, onset 10/25/17, pressure-Wound Length (cm): 9.5 cm  [REMOVED] Wound 08/16/17 #27- Thoracic spine, dehisced surgical wound, full thickness, onset 8/16/2017, pressure-Wound Length (cm): 10.5 cm  [REMOVED] Wound 10/17/14  #8, right ischium, stage 4 PU, onset 7/15/14 (merged with #30)-Wound Length (cm): 7.7 cm    [REMOVED] Wound 02/13/17 #22 left lateral foot, Mccauley 3 DFU, onset 2/2017, surgical dehiscence -Wound Width (cm): 0.3 cm  [REMOVED] Wound 06/07/17 #24 Left lateral lower leg-cluster, Venous, full thickness,  (onset  6/5/17) unknown-Wound Width (cm): 1.1 cm  [REMOVED] Wound 11/15/17 #31- R heel, pressure, Stage 2, onset 11/15/17, pressure-Wound Width (cm): 5.2 cm  [REMOVED] Wound 10/25/17 #29, right lateral foot cluster, pressure stage 4, onset 10/25/17, pressure-Wound Width (cm): 4.4 cm  [REMOVED] Wound 08/16/17 #27- Thoracic spine, dehisced surgical wound, full thickness, onset 8/16/2017, pressure-Wound Width (cm): 6.7 cm  [REMOVED] Wound 10/17/14  #8, right ischium, stage 4 PU, onset

## 2018-01-11 ENCOUNTER — TELEPHONE (OUTPATIENT)
Dept: FAMILY MEDICINE CLINIC | Age: 53
End: 2018-01-11

## 2018-01-11 DIAGNOSIS — I11.0 HYPERTENSIVE HEART DISEASE WITH HEART FAILURE (HCC): Primary | ICD-10-CM

## 2018-01-11 PROBLEM — R06.02 SOB (SHORTNESS OF BREATH): Status: RESOLVED | Noted: 2018-01-07 | Resolved: 2018-01-11

## 2018-01-11 PROBLEM — R07.9 CHEST PAIN: Status: RESOLVED | Noted: 2018-01-07 | Resolved: 2018-01-11

## 2018-01-12 ENCOUNTER — TELEPHONE (OUTPATIENT)
Dept: CARDIAC REHAB | Age: 53
End: 2018-01-12

## 2018-01-12 ENCOUNTER — TELEPHONE (OUTPATIENT)
Dept: MEDSURG UNIT | Age: 53
End: 2018-01-12

## 2018-01-12 NOTE — TELEPHONE ENCOUNTER
[address]    SYMPTOM ASSESSMENT: Post discharge follow-up phone call made to pt and he returned phone call to HF resource phone line. Pt denies shortness of breath, chest pain, edema, or difficulty sleeping; stated he has been feeling \"really pretty good\" since discharge. He mentions he is still recovering from his cough and cold but the cough is much better. Discharge weight was undeterminable due to inaccurate bed scale weights obtained while admitted. Advised pt his weight is likely around 300 lb ; Pt states his weight and measurements are obtained every Wednesday at his wound care appointment at [facility]. Pt mentions his home care nurse has been in contact from [facility] and will be out today for resumption of care after hospital. His HR and BP are recorded below. He reports his diabetes is doing \"well and his BS is 92 last night and 101 this morning\". MEDICATION REVIEW: He was able to fill all prescriptions and states he has been taking medications as prescribed. Reviewed patient medications. No discrepancies found. Instructed on Toprol XL, torsemide, and lisinopril. Pt is recording his HR and BP readings. HR is 70's (pt mentions his primary cardiologist wants his HR closer to 60's and his BP is 110/70 range at home)    FOLLOW-UP APPOINTMENT: Reminded pt of appointment scheduled for 1/18 with joel Naylor CNP at 9:45 am. Transportation is arranged with his [facility] aide and transportation as he is a paraplegic. EDUCATION: Educated pt on sodium restriction of <2000 mg daily and fluid restriction of < 64 ounces daily,  follow-up, and medication compliance. Reviewed recommended level of activity- pt does arm exercises and PT with home care with his paralysis. Notified pt to call the doctor post discharge if he experiences shortness of breath, chest pain, swelling, cough, or weight gain or loss of three pounds in a day/five pounds in a week.  Also

## 2018-01-16 ENCOUNTER — TELEPHONE (OUTPATIENT)
Dept: FAMILY MEDICINE CLINIC | Age: 53
End: 2018-01-16

## 2018-01-16 NOTE — TELEPHONE ENCOUNTER
Robert Bowman states Breanne had called him and he was returning the call. I could not see why patient was called.

## 2018-01-16 NOTE — TELEPHONE ENCOUNTER
Patient states that he has an appt this week with Dr. Minnie Garcia. I explained to patient that Dr. Minnie Garcia is requiring for him to be seen more often by her. He states that none of his health issues are issues she can take care of, like is heart. He is now seeing 2 cardiologist.  I explained that he still needed to be seen by his Primary care for other issues. He states that he would talk to Dr. Minnie Garcia on Thursday.

## 2018-01-17 ENCOUNTER — TELEPHONE (OUTPATIENT)
Dept: FAMILY MEDICINE CLINIC | Age: 53
End: 2018-01-17

## 2018-01-17 ENCOUNTER — HOSPITAL ENCOUNTER (OUTPATIENT)
Dept: WOUND CARE | Age: 53
Discharge: HOME | End: 2018-01-17
Attending: INTERNAL MEDICINE | Admitting: INTERNAL MEDICINE

## 2018-01-17 VITALS
RESPIRATION RATE: 17 BRPM | DIASTOLIC BLOOD PRESSURE: 60 MMHG | HEART RATE: 67 BPM | TEMPERATURE: 97 F | SYSTOLIC BLOOD PRESSURE: 95 MMHG

## 2018-01-17 PROCEDURE — 17250 CHEM CAUT OF GRANLTJ TISSUE: CPT | Performed by: INTERNAL MEDICINE

## 2018-01-17 PROCEDURE — 11043 DBRDMT MUSC&/FSCA 1ST 20/<: CPT | Performed by: INTERNAL MEDICINE

## 2018-01-17 ASSESSMENT — PAIN DESCRIPTION - DESCRIPTORS: DESCRIPTORS: BURNING;STABBING

## 2018-01-17 ASSESSMENT — PAIN DESCRIPTION - FREQUENCY: FREQUENCY: CONTINUOUS

## 2018-01-17 ASSESSMENT — PAIN DESCRIPTION - PROGRESSION: CLINICAL_PROGRESSION: NOT CHANGED

## 2018-01-17 ASSESSMENT — PAIN DESCRIPTION - ORIENTATION: ORIENTATION: LEFT

## 2018-01-17 ASSESSMENT — PAIN DESCRIPTION - PAIN TYPE: TYPE: CHRONIC PAIN

## 2018-01-17 ASSESSMENT — PAIN DESCRIPTION - LOCATION: LOCATION: SHOULDER

## 2018-01-17 ASSESSMENT — PAIN DESCRIPTION - ONSET: ONSET: ON-GOING

## 2018-01-17 ASSESSMENT — PAIN SCALES - GENERAL: PAINLEVEL_OUTOF10: 3

## 2018-01-17 NOTE — TELEPHONE ENCOUNTER
A PA for Amberly Goncalves was started on a wrong form.  Patient does not have [employer] my [facility] for prescriptions  Please call and use  Ref # [id number]

## 2018-01-17 NOTE — PLAN OF CARE
Problem: Wound:  Goal: Will show signs of wound healing; wound closure and no evidence of infection  Will show signs of wound healing; wound closure and no evidence of infection   Outcome: Ongoing  Right lateral foot with some noted improvement this week, debridement per MD, cont. With Triad & Iodoform as ordered. Right heel stable, bleeding easily, change to using betadine to alin-wound & eschar & start use of AqAg to red areas of wound, change daily. Left heel stable, no debridement, cont. Betadine as ordered. Cont. Use of Prevlon boots for off-loading as ordered. Left lateral foot stable, no debridement, cont. With Triad & Iodoform as ordered. Left lateral leg stable, no debridement, Ag Nitrate used per MD, some bone exposure noted, cont. with AqAg & KARLI NPWT as ordered. Back wound stable & healthier tissue noted overall, will cont. With current wound care regime & order NPWT, start NPWT when it arrives per MD orders. Right Ischial wound stable, no debridement, Ag Nitrate used per MD, no change in orders. Pt. States he is having pressure mapping on 1/19/18 per PT for his chair. Dr. Neema Werner continuing to work with Yandy Bermudez from [facility] to attempt to get pt. A bariatric ATILIO with assistance from [employer]. F/u in [address] [place] [address],[address] in 1 week as ordered. Reinforced importance of cont. To spend minimal time sitting in his chair & repositioning from side to side, pt. Verbalizes understanding. Discharge instructions reviewed with patient, all questions answered, copy given to patient. Dressings were applied to all wounds per M.D. Instructions at this visit.

## 2018-01-18 PROBLEM — D64.9 ANEMIA: Chronic | Status: RESOLVED | Noted: 2018-01-07 | Resolved: 2018-01-18

## 2018-01-18 NOTE — PROGRESS NOTES
[facility] Progress Note    Radha Solano     : 1965    DATE OF VISIT:  2018    Subjective:     Radha Solano is a 50 y.o. male who has a pressure ulcer located on the right ischium, right hip, right lateral foot and both heels. Significant symptoms or pertinent wound history since last visit: he was admitted to [facility] last week with CHF, diuresed significantly, and discharged late last week in improved condition. Feeling stronger, cough minimal, not short of breath now, no fever, no N/V, no drug rash, no sore mouth or throat. His home-care nurse commented how good his wounds looked last week after discharge (when he had been on a low air loss mattress for 5 days or so), but then also how they already looked a bit worse this week, after being home on his regular bed and mattress. Additional ulcer(s) noted? yes. T-spine surgical dehiscence, left lateral leg venous / diabetic, left lateral foot diabetic / surgical.    His current medication list consists of Alcohol Wipes, Insulin Pen Needle, Insulin Syringe-Needle U-100, Lancets, apixaban, aspirin, benzonatate, clopidogrel, cyclobenzaprine, docusate sodium, ferrous sulfate, glucose blood VI test strips, guaiFENesin-dextromethorphan, insulin aspart, insulin detemir, lisinopril, magnesium oxide, metFORMIN, metoprolol succinate, metroNIDAZOLE, nitroGLYCERIN, nystatin, oxyCODONE, pantoprazole, pravastatin, promethazine, senna, sulfamethoxazole-trimethoprim, torsemide, and traZODone. He was briefly given LVQ last week for suspicion of pneumonia, but that was later stopped. Lasix changed to Demadex, and at a higher equivalent dose. 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 7XXA    Iron deficiency anemia due to chronic blood loss D50.0    Lymphedema of both lower extremities I89.0    Neurogenic bladder N31.9    Neurogenic bowel K59.2    Pressure ulcer of right buttock, stage 4 ([facility]) L89.314    Hypergranulation L92.9    Dehiscence of surgical wound of T-spine T81.31XA    Chronic ulcer of left lower leg with bone involvement without evidence of necrosis L97.926    Onychomycosis B35.1    Dystrophic nail L60.3    Diabetic ulcer of left midfoot associated with type 2 diabetes mellitus, with necrosis of bone ([facility]) E11.621, L97.424    Peripheral venous insufficiency I87.2    Ischemic cardiomyopathy I25.5    Pressure ulcer of right foot, stage 4 ([facility]) L89.894    Pressure ulcer of right heel, unstageable ([facility]) L89.610    Pressure ulcer of left heel, unstageable ([facility]) L89.620       Assessment of today's active condition(s): DM2, paraplegia, multiple nonhealing ulcers related to pressure, prior infection, prior surgeries, and effects of DM and venous disease; T-spine has cleaned up very well, looks well-offloaded, much less inflamed and necrotic, responding well to Bactrim for prior signs of soft tissue infection; right ischium a bit better, still needs max offloading; right hip stagnant but benign, we'll address that later; heel eschars stable and peripheral tissue healthier; right foot healthier overall, still not sure if I can completely manage the peroneal tendon tunnel without having him go to the OR; left foot wound planned for surgery, hopefully next month when cardiology will feel comfortable with a pause in Plavix therapy; left leg regressed in one area, but improved in others. Factors contributing to occurrence and/or persistence of the chronic ulcer include edema, venous stasis, lymphedema, diabetes, chronic pressure, decreased mobility, shear force, obesity and decreased tissue oxygenation. Medical necessity of today's visit is shown by the above documentation. Sharp debridement is indicated today, based upon the exam findings in the wound(s) above. Procedure note:     Consent obtained. Time out performed per [facility] policy. Anesthetic  Anesthetic: None     Using a curette, #15 blade scalpel and scissors, I sharply debrided the right foot ulcer(s) down through and including the removal of muscle/fascia. The type(s) of tissue debrided included slough and necrotic/eschar. Total Surface Area Debrided: 5 sq cm.     Post  Debridement Measurements:  Wound 10/25/17 #29, right lateral foot cluster, pressure stage 4, onset 10/25/17, pressure-Wound Length (cm): 8.1 cm  Wound 11/15/17 #31- R heel, pressure, Stage 2, onset 11/15/17, pressure-Wound Length (cm): 5.5 cm  Wound 06/07/17 #24 Left lateral lower leg-cluster, Venous, full thickness,  (onset  6/5/17) unknown-Wound Length (cm): 13.6 cm  Wound 02/13/17 #22 left lateral foot, Mccauley 3 DFU, onset 2/2017, surgical dehiscence -Wound Length (cm): 0.8 cm  Wound 08/16/17 #27- Thoracic spine, dehisced surgical wound, full thickness, onset 8/16/2017, pressure-Wound Length (cm): 11 cm  Wound 10/17/14  #8, right ischium, stage 4 PU, onset 7/15/14 (merged with #30)-Wound Length (cm): 6 cm    Wound 10/25/17 #29, right lateral foot cluster, pressure stage 4, onset 10/25/17, pressure-Wound Width (cm): 3.6 cm  Wound 11/15/17 #31- R heel, pressure, Stage 2, onset 11/15/17, pressure-Wound Width (cm): 4 cm  Wound 06/07/17 #24 Left lateral lower leg-cluster, Venous, full thickness,  (onset  6/5/17) unknown-Wound Width (cm): 1.6 cm  Wound 02/13/17 #22 left lateral foot, Mccauley 3 DFU, onset 2/2017, surgical dehiscence -Wound multitude of wounds. I believe the patient's current nutritional status is adequate to support negative-pressure wound therapy (see below). Continue to focus on offloading, glucose control and protein intake. Seeing heart failure team tomorrow, and will have labs done -- I'll F/U on creatinine and potassium given chronic Bactrim therapy. Reji still needs to get in touch with his waiver-program  to see about coverage for the overage charge for a bariatric low air loss bed at home. He very much needs a low air loss mattress, but the standard size bed makes turning and repositioning very difficult and painful, given his spinal fusion and chronic shoulder pain; for now, memory foam mattress and [employer]'s Tortoise positioning device, minimal time spent in his chair. [facility] is having PT assess him at home very soon as well, to pressure-map him for a new motorized wheelchair. Continue oral Bactrim for now; might eventually go to one tab daily, but not yet; tentative plan to keep this going indefinitely, given chronic hardware exposure in spinal wound, and concern for relapse of clinically active infection if we stop. Discussed with Dr. Aleisha Clark again, and we feel that the risks of surgery may well outweigh the benefits at this point, so will try to manage the wound more conservatively (topical antisepsis, debridement as needed, long-term Abx, maximum offloading, and now addition of NPWT to see if we can get the hardware to granulate over, and the wound volume to fill in some more). Even a relatively small but never healing wound in this area will be a success for him, in my opinion, all things considered. Before scheduling left foot surgery with Dr. Britta Galvin, we'll have to see how his left leg and right foot are doing in a few weeks -- I'm hoping he may not need surgery for the left leg, but think that he might need a small amount of work on the right foot.      I reminded the patient of the

## 2018-01-22 ENCOUNTER — TELEPHONE (OUTPATIENT)
Dept: FAMILY MEDICINE CLINIC | Age: 53
End: 2018-01-22

## 2018-01-23 ENCOUNTER — OFFICE VISIT (OUTPATIENT)
Dept: FAMILY MEDICINE CLINIC | Age: 53
End: 2018-01-23

## 2018-01-23 VITALS
HEART RATE: 66 BPM | OXYGEN SATURATION: 97 % | DIASTOLIC BLOOD PRESSURE: 62 MMHG | SYSTOLIC BLOOD PRESSURE: 106 MMHG | HEIGHT: 74 IN

## 2018-01-23 DIAGNOSIS — E11.622 TYPE 2 DIABETES MELLITUS WITH OTHER SKIN ULCER, WITH LONG-TERM CURRENT USE OF INSULIN (HCC): ICD-10-CM

## 2018-01-23 DIAGNOSIS — K59.2 NEUROGENIC BOWEL: ICD-10-CM

## 2018-01-23 DIAGNOSIS — N31.9 NEUROGENIC BLADDER: ICD-10-CM

## 2018-01-23 DIAGNOSIS — I50.23 ACUTE ON CHRONIC SYSTOLIC CHF (CONGESTIVE HEART FAILURE) (HCC): Primary | ICD-10-CM

## 2018-01-23 DIAGNOSIS — Z79.4 TYPE 2 DIABETES MELLITUS WITH OTHER SKIN ULCER, WITH LONG-TERM CURRENT USE OF INSULIN (HCC): ICD-10-CM

## 2018-01-23 DIAGNOSIS — E61.2 MAGNESIUM DEFICIENCY: ICD-10-CM

## 2018-01-23 DIAGNOSIS — I25.5 ISCHEMIC CARDIOMYOPATHY: ICD-10-CM

## 2018-01-23 DIAGNOSIS — D50.0 IRON DEFICIENCY ANEMIA DUE TO CHRONIC BLOOD LOSS: ICD-10-CM

## 2018-01-23 DIAGNOSIS — G82.20 PARAPLEGIA (HCC): ICD-10-CM

## 2018-01-23 DIAGNOSIS — E78.00 PURE HYPERCHOLESTEROLEMIA: ICD-10-CM

## 2018-01-23 DIAGNOSIS — I25.10 CORONARY ARTERY DISEASE INVOLVING NATIVE CORONARY ARTERY OF NATIVE HEART WITHOUT ANGINA PECTORIS: ICD-10-CM

## 2018-01-23 PROCEDURE — 99495 TRANSJ CARE MGMT MOD F2F 14D: CPT | Performed by: INTERNAL MEDICINE

## 2018-01-23 RX ORDER — INSULIN GLARGINE 100 [IU]/ML
INJECTION, SOLUTION SUBCUTANEOUS NIGHTLY
COMMUNITY
End: 2018-03-19 | Stop reason: SDUPTHER

## 2018-01-23 ASSESSMENT — PATIENT HEALTH QUESTIONNAIRE - PHQ9
SUM OF ALL RESPONSES TO PHQ QUESTIONS 1-9: 0
1. LITTLE INTEREST OR PLEASURE IN DOING THINGS: 0
2. FEELING DOWN, DEPRESSED OR HOPELESS: 0
SUM OF ALL RESPONSES TO PHQ9 QUESTIONS 1 & 2: 0

## 2018-01-23 NOTE — PROGRESS NOTES
bSubjective:      Patient ID: Parish Larose is a 50 y.o. male.     HPI    Review of Systems    Objective:   Physical Exam    Assessment:            Plan:             Post-Discharge Transitional Care Management Services      Parish Larose   YOB: 1965    Date of Office Visit:  1/23/2018  Date of Hospital Admission: 1/7/18  Date of Hospital Discharge: 1/11/18  [facility] Risk Score [risk of hospital readmission >=10  medium risk (chance of readmission ~ 12%) >14  high risk (chance of readmission ~18%)]:Risk Score: 22.75    Care management risk score Rising risk (score 2-5) and Complex Care (Scores >=6): 5       Patient Active Problem List   Diagnosis    Hyperlipidemia    Coronary artery disease involving native coronary artery of native heart without angina pectoris    Type 2 diabetes mellitus with skin complication, with long-term current use of insulin (HCC)    Paraplegia (HCC)    Controlled substance agreement signed    Chronic back pain    Arthritis    Other secondary aldosteronism    Pressure ulcer of right hip, stage 4 ([address]    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 ([address]    Iron deficiency anemia due to chronic blood loss    Lymphedema of both lower extremities    Neurogenic bladder    Neurogenic bowel    Pressure ulcer of right buttock, stage 4 ([address]    Hypergranulation    Dehiscence of surgical wound of T-spine    Chronic ulcer of left lower leg with bone involvement without evidence of necrosis    Onychomycosis    Dystrophic nail    Diabetic ulcer of left midfoot associated with type 2 diabetes mellitus, with necrosis of bone ([address]    Peripheral venous insufficiency    Ischemic cardiomyopathy    Pressure ulcer of right foot, stage 4 (HCC)    Pressure ulcer of right heel, unstageable (HCC)    Pressure ulcer of left heel, unstageable (HCC)       Allergies   Allergen Reactions    Benadryl [Diphenhydramine Hcl] Anaphylaxis     Throat swelling    Statins [Statins]     Cephalexin Rash    Morphine Anxiety     Hallucinations     Penicillins Rash    Sulfa Antibiotics Rash       Medications listed as ordered at the time of discharge from hospital   [facility] Medication Instructions HENOK:    Printed on:01/28/18 [id number]   Medication Information                      Alcohol Swabs (ALCOHOL WIPES) PADS  ONE TOPICALLY FOUR TIMES A DAY             apixaban (ELIQUIS) 5 MG TABS tablet  Take 5 mg by mouth 2 times daily             aspirin 81 MG tablet  Take 81 mg by mouth daily             B-D ULTRAFINE III SHORT PEN 31G X 8 MM MISC  USE FOUR TIMES A DAY OR AS DIRECTED             benzonatate (TESSALON PERLES) 100 MG capsule  Take 1-2 capsules by mouth 3 times daily as needed for Cough             clopidogrel (PLAVIX) 75 MG tablet  TAKE ONE TABLET BY MOUTH DAILY             COMFORT ASSIST INSULIN SYRINGE 31G X 5/16\" 1 ML MISC  USE AS DIRECTED TO INJECT INSULIN FOUR TIMES DAILY             cyclobenzaprine (FLEXERIL) 10 MG tablet  Take 1 tablet by mouth 2 times daily as needed for Muscle spasms             DOCQLACE 100 MG capsule  TAKE TWO CAPSULES BY MOUTH DAILY             ferrous sulfate 325 (65 Fe) MG tablet  once daily and then 2 tabs at bedtime             FREESTYLE LITE strip  USE TO TEST 5 TIMES DAILY             insulin glargine (LANTUS) 100 UNIT/ML injection vial  Inject into the skin nightly             Lancets MISC  One touch             lisinopril (PRINIVIL;ZESTRIL) 10 MG tablet  Take 1 tablet by mouth daily             magnesium oxide (MAG-OX) 400 (241.3 Mg) MG TABS tablet  TAKE FOUR TABLETS BY MOUTH EVERY MORNING AND TAKE FIVE TABLETS BY MOUTH EVERY EVENING             metFORMIN (GLUCOPHAGE) 1000 MG tablet  TAKE ONE TABLET BY MOUTH TWICE A DAY FOR  BLOOD SUGAR             metoprolol succinate (TOPROL XL) 100 MG extended release tablet  Take 1 tablet by mouth daily             metroNIDAZOLE (FLAGYL) 500 MG tablet  Crush one tablet into a fine powder daily, sprinkle once daily and then 2 tabs at bedtime 270 tablet 0    benzonatate (TESSALON PERLES) 100 MG capsule Take 1-2 capsules by mouth 3 times daily as needed for Cough 90 capsule 1    DOCQLACE 100 MG capsule TAKE TWO CAPSULES BY MOUTH DAILY 180 capsule 1    sulfamethoxazole-trimethoprim (BACTRIM DS) 800-160 MG per tablet Take 1 tablet by mouth 2 times daily ; start this the day after you finish the 6-day course of suspension. (Patient taking differently: Take 1 tablet by mouth daily ; dose decreased after K+ and creatinine increased with change in diuretic and ACE. ) 60 tablet 5    metroNIDAZOLE (FLAGYL) 500 MG tablet Crush one tablet into a fine powder daily, sprinkle into upper back wound after cleansing and before applying new dressing.  30 tablet 1    pantoprazole (PROTONIX) 40 MG tablet TAKE ONE TABLET BY MOUTH DAILY 90 tablet 3    promethazine (PHENERGAN) 25 MG tablet TAKE ONE TABLET BY MOUTH EVERY 6 HOURS AS NEEDED FOR NAUSEA 15 tablet 5    FREESTYLE LITE strip USE TO TEST 5 TIMES DAILY 150 strip 9    aspirin 81 MG tablet Take 81 mg by mouth daily      apixaban (ELIQUIS) 5 MG TABS tablet Take 5 mg by mouth 2 times daily      [DISCONTINUED] pravastatin (PRAVACHOL) 80 MG tablet Take 80 mg by mouth daily      SENNA LAX 8.6 MG tablet TAKE TWO TABLETS BY MOUTH DAILY 180 tablet 4    NOVOLOG 100 UNIT/ML injection vial INJECT 10 UNITS SUBCUTANEOUSLY THREE TIMES A DAY BEFORE MEALS (Patient taking differently: INJECT 12 UNITS SUBCUTANEOUSLY THREE TIMES A DAY with MEALS + Sliding scale) 5 vial 5    magnesium oxide (MAG-OX) 400 (241.3 Mg) MG TABS tablet TAKE FOUR TABLETS BY MOUTH EVERY MORNING AND TAKE FIVE TABLETS BY MOUTH EVERY EVENING 270 tablet 5    metFORMIN (GLUCOPHAGE) 1000 MG tablet TAKE ONE TABLET BY MOUTH TWICE A DAY FOR  BLOOD SUGAR 180 tablet 3    COMFORT ASSIST INSULIN SYRINGE 31G X 5/16\" 1 ML MISC USE AS DIRECTED TO INJECT INSULIN FOUR TIMES DAILY 100 each 10    nystatin (MYCOSTATIN) 439481 UNIT/GM [address] see below 01/19/2017    LDLCALC 102 (H) 07/27/2016     Lab Results   Component Value Date    LABVLDL 22 01/08/2018    LABVLDL see below 01/19/2017    LABVLDL 47 07/27/2016       Chemistry        Component Value Date/Time     01/24/2018 1320    K 5.5 (H) 01/24/2018 1320    K 4.8 01/08/2018 0337    CL 98 (L) 01/24/2018 1320    CO2 25 01/24/2018 1320    BUN 38 (H) 01/24/2018 1320    CREATININE 1.5 (H) 01/24/2018 1320        Component Value Date/Time    CALCIUM 9.2 01/24/2018 1320    ALKPHOS 104 01/06/2018 1714    AST 9 (L) 01/06/2018 1714    ALT <5 (L) 01/06/2018 1714    BILITOT <0.2 01/06/2018 1714        Chief Complaint   Patient presents with    [facility] 1/7-1/11/18.  CHF     Non face to face  following discharge, date last encounter closed (first attempt may have been earlier):   Call initiated 2 business days of discharge:   Interval history/Current status: back to his baseline    Physical Exam:  General Appearance: alert and oriented to person, place and time, well developed and well- nourished, in no acute distress  Skin: warm and dry, no rash or erythema  Head: normocephalic and atraumatic  Eyes: pupils equal, round, and reactive to light, extraocular eye movements intact, conjunctivae normal  ENT: tympanic membrane, external ear and ear canal normal bilaterally, nose without deformity, nasal mucosa and turbinates normal without polyps  Neck: supple and non-tender without mass, no thyromegaly or thyroid nodules, no cervical lymphadenopathy  Pulmonary/Chest: clear to auscultation bilaterally- no wheezes, rales or rhonchi, normal air movement, no respiratory distress  Cardiovascular: normal rate, regular rhythm, normal S1 and S2, no murmurs, rubs, clicks, or gallops, distal pulses intact, no carotid bruits  Abdomen: soft, non-tender, non-distended  Extremities: no cyanosis, clubbing or edema  Musculoskeletal: normal range of motion, no joint swelling, deformity or tenderness  Neurologic: paraplegia, below level of mid sternum,no cranial nerve deficit,speech normal    Assessment/Plan:  1. Acute on chronic systolic CHF (congestive heart failure) (HCC)  -on demadex    2. Pure hypercholesterolemia  --on statin    3. Paraplegia (HCC)  *-stable    4. Coronary artery disease involving native coronary artery of native heart without angina pectoris  --stable    5. Ischemic cardiomyopathy  -stable    6. Type 2 diabetes mellitus with other skin ulcer, with long-term current use of insulin ([facility])    - insulin glargine (LANTUS) 100 UNIT/ML injection vial; Inject into the skin nightly    7. Iron deficiency anemia due to chronic blood loss  -on iron supp    8. Neurogenic bladder  -stable    9. Neurogenic bowel  -stable    10.  Magnesium deficiency  -on magnesium supplement            Medical Decision Making: high complexity

## 2018-01-24 ENCOUNTER — HOSPITAL ENCOUNTER (OUTPATIENT)
Dept: WOUND CARE | Age: 53
Discharge: HOME | End: 2018-01-24
Attending: INTERNAL MEDICINE | Admitting: INTERNAL MEDICINE

## 2018-01-24 VITALS
HEART RATE: 70 BPM | SYSTOLIC BLOOD PRESSURE: 88 MMHG | DIASTOLIC BLOOD PRESSURE: 56 MMHG | RESPIRATION RATE: 18 BRPM | HEIGHT: 74 IN | TEMPERATURE: 97 F

## 2018-01-24 LAB
ANION GAP SERPL CALCULATED.3IONS-SCNC: 13 MMOL/L (ref 3–16)
BUN BLDV-MCNC: 38 MG/DL (ref 7–20)
CALCIUM SERPL-MCNC: 9.2 MG/DL (ref 8.3–10.6)
CHLORIDE BLD-SCNC: 98 MMOL/L (ref 99–110)
CO2: 25 MMOL/L (ref 21–32)
CREAT SERPL-MCNC: 1.5 MG/DL (ref 0.9–1.3)
GFR AFRICAN AMERICAN: 60
GFR NON-AFRICAN AMERICAN: 50
GLUCOSE BLD-MCNC: 98 MG/DL (ref 70–99)
POTASSIUM SERPL-SCNC: 5.5 MMOL/L (ref 3.5–5.1)
SODIUM BLD-SCNC: 136 MMOL/L (ref 136–145)

## 2018-01-24 PROCEDURE — 11043 DBRDMT MUSC&/FSCA 1ST 20/<: CPT | Performed by: INTERNAL MEDICINE

## 2018-01-24 PROCEDURE — 11047 DBRDMT BONE EACH ADDL: CPT | Performed by: INTERNAL MEDICINE

## 2018-01-24 PROCEDURE — 11046 DBRDMT MUSC&/FSCA EA ADDL: CPT | Performed by: INTERNAL MEDICINE

## 2018-01-24 PROCEDURE — 17250 CHEM CAUT OF GRANLTJ TISSUE: CPT | Performed by: INTERNAL MEDICINE

## 2018-01-24 PROCEDURE — 11044 DBRDMT BONE 1ST 20 SQ CM/<: CPT | Performed by: INTERNAL MEDICINE

## 2018-01-24 RX ORDER — ROSUVASTATIN CALCIUM 20 MG/1
20 TABLET, COATED ORAL NIGHTLY
COMMUNITY
Start: 2018-01-18 | End: 2019-08-30 | Stop reason: SDUPTHER

## 2018-01-24 ASSESSMENT — PAIN DESCRIPTION - DESCRIPTORS: DESCRIPTORS: BURNING;STABBING

## 2018-01-24 ASSESSMENT — PAIN DESCRIPTION - LOCATION: LOCATION: SHOULDER

## 2018-01-24 ASSESSMENT — PAIN DESCRIPTION - ONSET: ONSET: ON-GOING

## 2018-01-24 ASSESSMENT — PAIN DESCRIPTION - PAIN TYPE: TYPE: CHRONIC PAIN

## 2018-01-24 ASSESSMENT — PAIN DESCRIPTION - PROGRESSION: CLINICAL_PROGRESSION: NOT CHANGED

## 2018-01-24 ASSESSMENT — PAIN SCALES - GENERAL: PAINLEVEL_OUTOF10: 3

## 2018-01-24 ASSESSMENT — PAIN DESCRIPTION - FREQUENCY: FREQUENCY: CONTINUOUS

## 2018-01-24 ASSESSMENT — PAIN DESCRIPTION - ORIENTATION: ORIENTATION: LEFT

## 2018-01-25 NOTE — PLAN OF CARE
Problem: Wound:  Goal: Will show signs of wound healing; wound closure and no evidence of infection  Will show signs of wound healing; wound closure and no evidence of infection   Outcome: Ongoing  Right lateral foot remains with tunneling, debridement per Dr. Tony Del Rio. With current wound care regime. Rt. Heel eschar softening, debridement of eschar today, start Triad & AqAg as ordered. Left heel stable, no debridement, cont. With betadine as ordered. Cont. Using prevlon boots for off-loading of heels. Left lateral foot stable, no debridement, cont. With current wound care regime. Left lateral leg showing some improvement, no debridement, Ag Nitrate used per MD, will cont. With current wound care regime with KARLI NPWT. Rt. Hip stable, no debridement, cont. With current wound care regime. Rt. Ischial showing great improvement, no debridement, Ag nitrate used per MD, cont. With current wound care regime. Reinforced importance of pt. Cont. To spend more time in bed & not sitting in his power chair & noted improvement of Ischial wound noted with him doing this. Pt. Repositions from side to side. Also Thoracic back wound stable, debridement per Dr Tony Mathew With current wound care regime & NPWT as ordered. F/u in [facility] in 1 week as ordered. Pt. States the pressure mapping of his chair got delayed & is awaiting this to be rescheduled. Dr. Araceli López is continuing to work with [facility] re:coverage of bariatric low air loss. Discharge instructions reviewed with patient, all questions answered, copy given to patient. Dressings were applied to all wounds per M.D. Instructions at this visit.

## 2018-01-29 PROBLEM — L97.925 NON-PRESSURE CHRONIC ULCER OF LEFT LOWER LEG WITH MUSCLE INVOLVEMENT WITHOUT EVIDENCE OF NECROSIS (HCC): Status: ACTIVE | Noted: 2017-06-10

## 2018-01-29 NOTE — PROGRESS NOTES
refill  Mild-moderate pitting LE edema, more firm and indurated in right thigh as usual  No cellulitis, no thrush, no drug rash, no lymphangitis, no obvious acute arthritis  [place]-ulcer skin: generally pink and indurated; less ecchymotic and irritated at T-spine and ischium, some flaking hyperkeratosis at heels. Ulcer(s): T-spine wound a bit more granulation, a bit less SQ and fascial necrosis, one spinous process still exposed and partly soft, some fibrin, no pus or biofilm; right ischium smaller, red, mildly hypergranular, clean; right hip stable small sinus tract; right foot more granular, a bit smaller, still some necrotic peroneal tendon, with tunnel stalled at 3 cm deep; right heel with central black eschar softening with some necrotic fat and fascia underneath, but peripheral granulation; left heel central black stable eschar, peripheral granulation; left foot stable deep sinus tract; left leg smaller, more granular, no exposed bone, a bit hypergranular, less proximal tunneling along tendon. Pre-debridement wound measurements are in the wound documentation flowsheet. Photos also saved in electronic chart. Assessment:     Patient Active Problem List   Diagnosis Code    Hyperlipidemia E78.5    Coronary artery disease involving native coronary artery of native heart without angina pectoris I25.10    Type 2 diabetes mellitus with skin complication, with long-term current use of insulin ([facility]) E11.628, Z79.4    Paraplegia ([facility] G82.20    Controlled substance agreement signed Z79.899    Chronic back pain M54.9, G89.29    Arthritis M19.90    Other secondary aldosteronism E26.1    Pressure ulcer of right hip, stage 4 ([address] L89.214    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 7XXA    Iron deficiency anemia due to chronic blood loss D50.0    Lymphedema of both lower extremities I89.0    Neurogenic bladder N31.9    Neurogenic bowel K59.2    Pressure ulcer of right buttock, stage 4 ([facility]) L89.314    Hypergranulation L92.9    Dehiscence of surgical wound of T-spine T81.31XA    Chronic ulcer of left lower leg with bone involvement without evidence of necrosis L97.926    Onychomycosis B35.1    Dystrophic nail L60.3    Diabetic ulcer of left midfoot associated with type 2 diabetes mellitus, with necrosis of bone (HCC) E11.621, L97.424    Peripheral venous insufficiency I87.2    Ischemic cardiomyopathy I25.5    Pressure ulcer of right foot, stage 4 (HCC) L89.894    Pressure ulcer of right heel, unstageable (HCC) L89.610    Pressure ulcer of left heel, unstageable (HCC) L89.620       Assessment of today's active condition(s): DM, paraplegia, multiple nonhealing wounds related to pressure, prior infections and surgeries mostly; this was a good week overall, especially in terms of offloading, and health of the T-spine and right foot ulcers; right heel progressed to the point where more debridement is indicated. No apparent ischemia, no soft tissue infection, seems to be tolerating chronic Bactrim for treatment of exposed spinal hardware. Factors contributing to occurrence and/or persistence of the chronic ulcer include lymphedema, diabetes, chronic pressure, decreased mobility, shear force and obesity. Medical necessity of today's visit is shown by the above documentation. Sharp debridement is indicated today, based upon the exam findings in the wound(s) above. Procedure note:     Consent obtained. Time out performed per [facility] policy. Anesthetic  Anesthetic: None     Using a curette, scissors, forceps and rongeur, I sharply debrided the back ulcer(s) down through and including the removal of bone. The type(s) of tissue debrided included fibrin, slough and necrotic/eschar. Total Surface Area Debrided: approx 50 sq cm. Using a curette, scissors, forceps and # 10 blade scalpel, I sharply debrided the right lateral foot and right heel ulcer(s) down through and including the removal of muscle/fascia.  The type(s) of tissue debrided included fibrin, slough and necrotic/eschar. Total Surface Area Debrided: approx 25 sq cm.     Post  Debridement Measurements:  Wound 06/07/17 #24 Left lateral lower leg-cluster, Venous, full thickness,  (onset  6/5/17) unknown-Wound Length (cm): 15.5 cm  Wound 02/13/17 #22 left lateral foot, [place] 3 DFU, onset 2/2017, surgical dehiscence -Wound Length (cm): 1 cm  Wound 10/25/17 #29, right lateral foot cluster, pressure stage 4, onset 10/25/17, pressure-Wound Length (cm): 8.5 cm  Wound 11/15/17 #31- R heel, pressure, Stage 4, onset 11/15/17, pressure-Wound Length (cm): 6 cm  Wound 08/16/17 #27- Thoracic spine, dehisced surgical wound, full thickness, onset 8/16/2017, pressure-Wound Length (cm): 10.1 cm  Wound 10/17/14  #8, right ischium, stage 4 PU, onset 7/15/14 (merged with #30)-Wound Length (cm): 4.4 cm    Wound 06/07/17 #24 Left lateral lower leg-cluster, Venous, full thickness,  (onset  6/5/17) unknown-Wound Width (cm): 2.5 cm  Wound 02/13/17 #22 left lateral foot, [place] 3 DFU, onset 2/2017, surgical dehiscence -Wound Width (cm): 0.4 cm  Wound 10/25/17 #29, right lateral foot cluster, pressure stage 4, onset 10/25/17, pressure-Wound Width (cm): 4 cm  Wound 11/15/17 #31- R heel, pressure, Stage 4, onset 11/15/17, pressure-Wound Width (cm): 4 cm  Wound 08/16/17 #27- Thoracic spine, dehisced surgical wound, full thickness, onset 8/16/2017, pressure-Wound Width (cm): 9 cm  Wound 10/17/14  #8, right ischium, stage 4 PU, onset 7/15/14 (merged with #30)-Wound Width (cm): 1.5 cm    Wound 06/07/17 #24 Left lateral lower leg-cluster, Venous, full thickness,  (onset  6/5/17) unknown-Wound Depth (cm) : 0.8  Wound 02/13/17 #22 left lateral foot, [place] 3 DFU, onset 2/2017, surgical dehiscence -Wound Depth (cm) : 3.4  Wound 10/25/17 #29, right lateral foot cluster, pressure stage 4, onset 10/25/17, pressure-Wound Depth (cm) : 1  Wound 11/15/17 #31- R heel, pressure, Stage 4, onset 11/15/17,

## 2018-01-31 ENCOUNTER — HOSPITAL ENCOUNTER (OUTPATIENT)
Dept: WOUND CARE | Age: 53
Discharge: HOME | End: 2018-01-31
Attending: INTERNAL MEDICINE | Admitting: INTERNAL MEDICINE

## 2018-01-31 VITALS
SYSTOLIC BLOOD PRESSURE: 86 MMHG | DIASTOLIC BLOOD PRESSURE: 53 MMHG | TEMPERATURE: 97.5 F | RESPIRATION RATE: 18 BRPM | HEART RATE: 74 BPM | HEIGHT: 74 IN

## 2018-01-31 LAB
ANION GAP SERPL CALCULATED.3IONS-SCNC: 15 MMOL/L (ref 3–16)
BUN BLDV-MCNC: 41 MG/DL (ref 7–20)
CALCIUM SERPL-MCNC: 9.1 MG/DL (ref 8.3–10.6)
CHLORIDE BLD-SCNC: 96 MMOL/L (ref 99–110)
CO2: 23 MMOL/L (ref 21–32)
CREAT SERPL-MCNC: 1.4 MG/DL (ref 0.9–1.3)
GFR AFRICAN AMERICAN: >60
GFR NON-AFRICAN AMERICAN: 54
GLUCOSE BLD-MCNC: 84 MG/DL (ref 70–99)
POTASSIUM SERPL-SCNC: 5.2 MMOL/L (ref 3.5–5.1)
SODIUM BLD-SCNC: 134 MMOL/L (ref 136–145)

## 2018-01-31 PROCEDURE — 11043 DBRDMT MUSC&/FSCA 1ST 20/<: CPT | Performed by: INTERNAL MEDICINE

## 2018-01-31 PROCEDURE — 11045 DBRDMT SUBQ TISS EACH ADDL: CPT | Performed by: INTERNAL MEDICINE

## 2018-01-31 PROCEDURE — 17250 CHEM CAUT OF GRANLTJ TISSUE: CPT | Performed by: INTERNAL MEDICINE

## 2018-01-31 PROCEDURE — 97606 NEG PRS WND THER DME>50 SQCM: CPT | Performed by: INTERNAL MEDICINE

## 2018-01-31 PROCEDURE — 11042 DBRDMT SUBQ TIS 1ST 20SQCM/<: CPT | Performed by: INTERNAL MEDICINE

## 2018-01-31 ASSESSMENT — PAIN DESCRIPTION - ORIENTATION: ORIENTATION: LEFT

## 2018-01-31 ASSESSMENT — PAIN DESCRIPTION - ONSET: ONSET: ON-GOING

## 2018-01-31 ASSESSMENT — PAIN DESCRIPTION - PROGRESSION: CLINICAL_PROGRESSION: NOT CHANGED

## 2018-01-31 ASSESSMENT — PAIN DESCRIPTION - PAIN TYPE: TYPE: CHRONIC PAIN

## 2018-01-31 ASSESSMENT — PAIN DESCRIPTION - LOCATION: LOCATION: SHOULDER

## 2018-01-31 ASSESSMENT — PAIN DESCRIPTION - FREQUENCY: FREQUENCY: CONTINUOUS

## 2018-01-31 ASSESSMENT — PAIN DESCRIPTION - DESCRIPTORS: DESCRIPTORS: BURNING;STABBING

## 2018-01-31 ASSESSMENT — PAIN SCALES - GENERAL: PAINLEVEL_OUTOF10: 3

## 2018-02-01 NOTE — PLAN OF CARE
Problem: Wound:  Goal: Will show signs of wound healing; wound closure and no evidence of infection  Will show signs of wound healing; wound closure and no evidence of infection   Outcome: Ongoing  Rt. Foot remains with tunnel & tendon exposure, debridement per Dr. Elvia Patterson, will cont. With Triad, packing into tunnel, & AqAg as ordered. Rt. Heel with recurrent eschar this week, debridement per Dr Elvia Patterson, will cont. With Triad & AqAg as ordered. Rt. Hip, Left heel & left lateral foot stable, no debridement, cont. With current wound care regime. Left lateral leg with bone exposure noted, debridement per Dr Elvia Patterson, hold KARLI NPWT this week, will return to using Triad & AqAg to wound. Right Ischial wound stable although doesn't look as good this week, Ag Nitrate used per Dr Gerard Guillermo. With current wound care regime. Pt. States he was out twice this week. Dr. Elvia Patterson reinforced importance of off-loading & spending minimal time sitting in his chair. Pt. States he sits for about 5 hours daily, otherwise in bed & repositions from side to side. Dr. Elvia Patterson is also currently working to get pt. A bariatric low air loss approved. Pt. Also had pressure mapping completed for his power chair & states it will take a few months to get the new chair. Back wound showing some signs of improvement, debridement per Dr. Elvia Patterson, will change to using Sorbact contact layer, then black foam to wound, pressure -150mmHg. F/u in [facility] in 1 week as ordered. Discharge instructions reviewed with patient, all questions answered, copy given to patient. Dressings were applied to all wounds per M.D. Instructions at this visit.

## 2018-02-07 ENCOUNTER — HOSPITAL ENCOUNTER (OUTPATIENT)
Dept: WOUND CARE | Age: 53
Discharge: HOME | End: 2018-02-07
Attending: INTERNAL MEDICINE | Admitting: INTERNAL MEDICINE

## 2018-02-07 VITALS
RESPIRATION RATE: 18 BRPM | SYSTOLIC BLOOD PRESSURE: 83 MMHG | HEIGHT: 74 IN | WEIGHT: 270 LBS | TEMPERATURE: 96.7 F | HEART RATE: 64 BPM | BODY MASS INDEX: 34.65 KG/M2 | DIASTOLIC BLOOD PRESSURE: 50 MMHG

## 2018-02-07 PROCEDURE — 97606 NEG PRS WND THER DME>50 SQCM: CPT | Performed by: INTERNAL MEDICINE

## 2018-02-07 PROCEDURE — 11046 DBRDMT MUSC&/FSCA EA ADDL: CPT | Performed by: INTERNAL MEDICINE

## 2018-02-07 PROCEDURE — 11043 DBRDMT MUSC&/FSCA 1ST 20/<: CPT | Performed by: INTERNAL MEDICINE

## 2018-02-07 PROCEDURE — 11042 DBRDMT SUBQ TIS 1ST 20SQCM/<: CPT | Performed by: INTERNAL MEDICINE

## 2018-02-07 PROCEDURE — 17250 CHEM CAUT OF GRANLTJ TISSUE: CPT | Performed by: INTERNAL MEDICINE

## 2018-02-07 PROCEDURE — 11045 DBRDMT SUBQ TISS EACH ADDL: CPT | Performed by: INTERNAL MEDICINE

## 2018-02-07 ASSESSMENT — PAIN DESCRIPTION - DESCRIPTORS: DESCRIPTORS: BURNING;STABBING

## 2018-02-07 ASSESSMENT — PAIN DESCRIPTION - PAIN TYPE: TYPE: CHRONIC PAIN

## 2018-02-07 ASSESSMENT — PAIN DESCRIPTION - ORIENTATION: ORIENTATION: LEFT

## 2018-02-07 ASSESSMENT — PAIN DESCRIPTION - PROGRESSION: CLINICAL_PROGRESSION: NOT CHANGED

## 2018-02-07 ASSESSMENT — PAIN SCALES - GENERAL: PAINLEVEL_OUTOF10: 3

## 2018-02-07 ASSESSMENT — PAIN DESCRIPTION - FREQUENCY: FREQUENCY: CONTINUOUS

## 2018-02-07 ASSESSMENT — PAIN DESCRIPTION - LOCATION: LOCATION: NECK;SHOULDER

## 2018-02-07 ASSESSMENT — PAIN DESCRIPTION - ONSET: ONSET: ON-GOING

## 2018-02-08 DIAGNOSIS — G47.09 OTHER INSOMNIA: ICD-10-CM

## 2018-02-08 RX ORDER — TRAZODONE HYDROCHLORIDE 50 MG/1
TABLET ORAL
Qty: 45 TABLET | Refills: 0 | Status: SHIPPED | OUTPATIENT
Start: 2018-02-08 | End: 2018-09-13 | Stop reason: SDUPTHER

## 2018-02-14 ENCOUNTER — HOSPITAL ENCOUNTER (OUTPATIENT)
Dept: WOUND CARE | Age: 53
Discharge: HOME | End: 2018-02-14
Attending: INTERNAL MEDICINE | Admitting: INTERNAL MEDICINE

## 2018-02-14 VITALS
WEIGHT: 270.8 LBS | SYSTOLIC BLOOD PRESSURE: 88 MMHG | BODY MASS INDEX: 34.75 KG/M2 | HEIGHT: 74 IN | RESPIRATION RATE: 17 BRPM | DIASTOLIC BLOOD PRESSURE: 55 MMHG | TEMPERATURE: 98.2 F | HEART RATE: 66 BPM

## 2018-02-14 LAB
ANION GAP SERPL CALCULATED.3IONS-SCNC: 17 MMOL/L (ref 3–16)
BUN BLDV-MCNC: 37 MG/DL (ref 7–20)
CALCIUM SERPL-MCNC: 8.8 MG/DL (ref 8.3–10.6)
CHLORIDE BLD-SCNC: 97 MMOL/L (ref 99–110)
CO2: 20 MMOL/L (ref 21–32)
CREAT SERPL-MCNC: 1.7 MG/DL (ref 0.9–1.3)
GFR AFRICAN AMERICAN: 52
GFR NON-AFRICAN AMERICAN: 43
GLUCOSE BLD-MCNC: 60 MG/DL (ref 70–99)
POTASSIUM SERPL-SCNC: 5.4 MMOL/L (ref 3.5–5.1)
SODIUM BLD-SCNC: 134 MMOL/L (ref 136–145)

## 2018-02-14 PROCEDURE — 11043 DBRDMT MUSC&/FSCA 1ST 20/<: CPT | Performed by: INTERNAL MEDICINE

## 2018-02-14 PROCEDURE — 11042 DBRDMT SUBQ TIS 1ST 20SQCM/<: CPT | Performed by: INTERNAL MEDICINE

## 2018-02-14 PROCEDURE — 97598 DBRDMT OPN WND ADDL 20CM/<: CPT | Performed by: INTERNAL MEDICINE

## 2018-02-14 PROCEDURE — 11045 DBRDMT SUBQ TISS EACH ADDL: CPT | Performed by: INTERNAL MEDICINE

## 2018-02-14 PROCEDURE — 11046 DBRDMT MUSC&/FSCA EA ADDL: CPT | Performed by: INTERNAL MEDICINE

## 2018-02-14 PROCEDURE — 97597 DBRDMT OPN WND 1ST 20 CM/<: CPT | Performed by: INTERNAL MEDICINE

## 2018-02-14 ASSESSMENT — PAIN DESCRIPTION - ONSET: ONSET: ON-GOING

## 2018-02-14 ASSESSMENT — PAIN DESCRIPTION - DESCRIPTORS: DESCRIPTORS: BURNING;STABBING

## 2018-02-14 ASSESSMENT — PAIN SCALES - GENERAL: PAINLEVEL_OUTOF10: 3

## 2018-02-14 ASSESSMENT — PAIN DESCRIPTION - PAIN TYPE: TYPE: CHRONIC PAIN

## 2018-02-14 ASSESSMENT — PAIN DESCRIPTION - FREQUENCY: FREQUENCY: CONTINUOUS

## 2018-02-14 ASSESSMENT — PAIN DESCRIPTION - PROGRESSION: CLINICAL_PROGRESSION: NOT CHANGED

## 2018-02-14 ASSESSMENT — PAIN DESCRIPTION - ORIENTATION: ORIENTATION: LEFT

## 2018-02-14 NOTE — PROGRESS NOTES
rods or bone), granulation a bit less inflamed, still some SQ necrosis and sloughy material within undermining, one area of exposed fascia pretty healthy; right ischium a small amount of fibrin +/- biofilm, a bit hypergranular, fibrotic in the deep recess; right hip stable sinus tract; right foot mostly granular, a bit of necrotic peroneal tendon, still a deep proximal tunnel; right heel part stable eschar, part dark sloughy necrosis, part red granulation; left heel mostly black eschar, some superior friable hypergranulation, a bit of exposed Achilles tendon, otherwise some peripheral SQ sloughy material; left lateral foot stable deep sinus tract; left leg part granular, part SQ fibronecrosis, narrower, some fibrin and biofilm. Pre-debridement wound measurements are in the wound documentation flowsheet. Photos also saved in electronic chart. Assessment:     Patient Active Problem List   Diagnosis Code    Hyperlipidemia E78.5    Coronary artery disease involving native coronary artery of native heart without angina pectoris I25.10    Type 2 diabetes mellitus with skin complication, with long-term current use of insulin ([facility]) E11.628, Z79.4    Paraplegia (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 T84. 7XXA    Iron deficiency anemia due to chronic blood loss D50.0    Lymphedema of both lower extremities I89.0    Neurogenic bladder N31.9    Neurogenic bowel K59.2    Pressure ulcer of right buttock, stage 4 ([facility]) L89.314    Hypergranulation L92.9    Dehiscence of surgical wound of T-spine T81.31XA    Non-pressure chronic ulcer of left lower leg with muscle involvement without evidence of necrosis L97.925    Onychomycosis B35.1    Dystrophic nail L60.3    Diabetic ulcer of left midfoot hemostasis was by pressure and by silver nitrate stick. The patient tolerated the procedure well, with no significant complications. The patient's level of pain during and after the procedure was monitored, and is noted in the wound documentation flowsheet. To encourage better epithelial cell coverage, I did use AgNO3 to chemically cauterize hypergranulation tissue on the buttocks ulcer(s), after application of 4% lidocaine topical solution. This was tolerated well, with no pain or skin injury. Discharge plan:     Treatment in the wound care center today: Wound 10/25/17 #29, right lateral foot cluster, pressure stage 4, onset 10/25/17, pressure-Dressing/Treatment:  (Zinc oxide,Aquacel Ag,Mepilex border)  Wound 11/15/17 #31- R heel, pressure, Stage 4, onset 11/15/17, pressure-Dressing/Treatment:  (Betadine,Triad,Aquacel Ag,4x4, [place])  Wound 02/13/17 #22 left lateral foot, [place] 3 DFU, onset 2/2017, surgical dehiscence -Dressing/Treatment:  (Zinc oxide,Aquacel Ag,Mepilex border)  Wound 06/07/17 #24 Left lateral lower leg-cluster, Venous, full thickness,  (onset  6/5/17) unknown-Dressing/Treatment:  (Triad,Aquacel Ag moistened, 4x4,ABD,Kerlix)  Wound 10/17/14  #8, right ischium, stage 4 PU, onset 7/15/14 (merged with #30)-Dressing/Treatment:  (Triad,Aquacel Ag,gauze bolster,gauze,Bepilex border)  Wound 08/16/17 #27- Thoracic spine, dehisced surgical wound, full thickness, onset 8/16/2017, pressure-Dressing/Treatment:  (Allkare,Cutimed Sorbact,Black foam,NPWT [place]). Continue efforts at offloading, glucose control and protein intake. Prevalon boots at all times except shower. Awaiting progress with wider hospital bed and low air loss mattress for home, along with new motorized chair. Continue daily Bactrim. I'll get a BMP next week.    Would call Dr. Juli Burks to schedule a F/U office visit, before planning surgery (which might now be for left foot, right foot (given tunnel), left leg (if fibula does
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
## 2018-02-21 ENCOUNTER — HOSPITAL ENCOUNTER (OUTPATIENT)
Dept: WOUND CARE | Age: 53
Discharge: HOME | End: 2018-02-21
Attending: INTERNAL MEDICINE | Admitting: INTERNAL MEDICINE

## 2018-02-21 VITALS
DIASTOLIC BLOOD PRESSURE: 54 MMHG | RESPIRATION RATE: 18 BRPM | SYSTOLIC BLOOD PRESSURE: 89 MMHG | HEIGHT: 74 IN | BODY MASS INDEX: 34.65 KG/M2 | HEART RATE: 66 BPM | TEMPERATURE: 97.1 F | WEIGHT: 270 LBS

## 2018-02-21 LAB
ANION GAP SERPL CALCULATED.3IONS-SCNC: 14 MMOL/L (ref 3–16)
BUN BLDV-MCNC: 46 MG/DL (ref 7–20)
CALCIUM SERPL-MCNC: 9.3 MG/DL (ref 8.3–10.6)
CHLORIDE BLD-SCNC: 95 MMOL/L (ref 99–110)
CO2: 21 MMOL/L (ref 21–32)
CREAT SERPL-MCNC: 1.7 MG/DL (ref 0.9–1.3)
GFR AFRICAN AMERICAN: 52
GFR NON-AFRICAN AMERICAN: 43
GLUCOSE BLD-MCNC: 77 MG/DL (ref 70–99)
POTASSIUM SERPL-SCNC: 5.9 MMOL/L (ref 3.5–5.1)
SODIUM BLD-SCNC: 130 MMOL/L (ref 136–145)

## 2018-02-21 PROCEDURE — 11043 DBRDMT MUSC&/FSCA 1ST 20/<: CPT | Performed by: INTERNAL MEDICINE

## 2018-02-21 PROCEDURE — 97606 NEG PRS WND THER DME>50 SQCM: CPT | Performed by: INTERNAL MEDICINE

## 2018-02-21 PROCEDURE — 17250 CHEM CAUT OF GRANLTJ TISSUE: CPT | Performed by: INTERNAL MEDICINE

## 2018-02-21 PROCEDURE — 11046 DBRDMT MUSC&/FSCA EA ADDL: CPT | Performed by: INTERNAL MEDICINE

## 2018-02-21 RX ORDER — SODIUM POLYSTYRENE SULFONATE 15 G/60ML
15 SUSPENSION ORAL; RECTAL ONCE
Qty: 3 BOTTLE | Refills: 0 | Status: ON HOLD | OUTPATIENT
Start: 2018-02-21 | End: 2018-03-07 | Stop reason: HOSPADM

## 2018-02-21 ASSESSMENT — PAIN DESCRIPTION - ONSET: ONSET: ON-GOING

## 2018-02-21 ASSESSMENT — PAIN DESCRIPTION - LOCATION: LOCATION: NECK

## 2018-02-21 ASSESSMENT — PAIN DESCRIPTION - FREQUENCY: FREQUENCY: CONTINUOUS

## 2018-02-21 ASSESSMENT — PAIN SCALES - GENERAL: PAINLEVEL_OUTOF10: 3

## 2018-02-21 ASSESSMENT — PAIN DESCRIPTION - PROGRESSION: CLINICAL_PROGRESSION: NOT CHANGED

## 2018-02-21 ASSESSMENT — PAIN DESCRIPTION - DESCRIPTORS: DESCRIPTORS: BURNING

## 2018-02-21 ASSESSMENT — PAIN DESCRIPTION - PAIN TYPE: TYPE: CHRONIC PAIN

## 2018-02-22 NOTE — PLAN OF CARE
Problem: Wound:  Goal: Will show signs of wound healing; wound closure and no evidence of infection  Will show signs of wound healing; wound closure and no evidence of infection   Outcome: Ongoing  Rt. Lateral foot stable, area of black noted to edge of wound, debridement per Dr. Marcella Nagel as documented. Will cont. With current wound care regime. Change to using single layer tubigrip on right. Edema overall controlled at this time. Left Lateral foot & Rt. Hip wounds stable, no debridement, cont. With current wound care regime. Left lateral leg with some improvement this week, debridement & Ag Nitrate per Dr Marcella Nagel as documented, will cont. With current wound care regime. Bilateral heels debrided per Dr Marcella Nagel, bone exposure remains on left, will cont. With Triad & Ag Alginate as ordered. Pt. Using prevlon boots for off-loading at all times. Rt. Ischial wound stable, debridement & Ag Nitrate per Dr. Marcella Nagel as documented, cont. With current wound care regime. Reinforced importance of not sitting in chair for prolonged periods of time & spending most of his time in bed, repositioning from side to side. Back wound stable & showing some improvement, debridement per DR. Pollard as documented, cont. Current wound care regime with NPWT as ordered. Pt. States he has been evaluated for a new power chair, although still awaiting pressure mapping to be completed. Also pt. States insurance has denied the bariatric low air loss at this time, although the appeal process is underway at this time & [employer] also attempting to get approval for bariatric ATILIO. Pt. Awaits follow up in March with Dr. Julia Florian for surgeries on Left & Right foot wounds, & possibly left leg & heel. Also discussed with pt. Importance of protein intake in his diet, pt. States he gets protein with each meal. Also pt. States he has not taken the [facility] for the last few weeks, Dr. Marcella Nagel encouraged pt. To attempt to take [facility] as long as its not a financial burden & pt. Verbalizes understanding. F/u in [facility] in 1 week as ordered. Discharge instructions reviewed with patient, all questions answered, copy given to patient. Dressings were applied to all wounds per M.D. Instructions at this visit.

## 2018-02-23 ENCOUNTER — HOSPITAL ENCOUNTER (OUTPATIENT)
Dept: OTHER | Age: 53
Discharge: HOME | End: 2018-02-28
Attending: INTERNAL MEDICINE | Admitting: INTERNAL MEDICINE

## 2018-02-23 LAB — POTASSIUM SERPL-SCNC: 5.8 MMOL/L (ref 3.5–5.1)

## 2018-02-28 ENCOUNTER — HOSPITAL ENCOUNTER (OUTPATIENT)
Dept: WOUND CARE | Age: 53
Discharge: HOME | End: 2018-02-28
Attending: INTERNAL MEDICINE | Admitting: INTERNAL MEDICINE

## 2018-02-28 VITALS
RESPIRATION RATE: 17 BRPM | BODY MASS INDEX: 34.25 KG/M2 | WEIGHT: 266.9 LBS | TEMPERATURE: 97 F | HEART RATE: 70 BPM | DIASTOLIC BLOOD PRESSURE: 54 MMHG | HEIGHT: 74 IN | SYSTOLIC BLOOD PRESSURE: 86 MMHG

## 2018-02-28 LAB
ANION GAP SERPL CALCULATED.3IONS-SCNC: 18 MMOL/L (ref 3–16)
BUN BLDV-MCNC: 50 MG/DL (ref 7–20)
CALCIUM SERPL-MCNC: 8.7 MG/DL (ref 8.3–10.6)
CHLORIDE BLD-SCNC: 92 MMOL/L (ref 99–110)
CO2: 22 MMOL/L (ref 21–32)
CREAT SERPL-MCNC: 1.6 MG/DL (ref 0.9–1.3)
GFR AFRICAN AMERICAN: 56
GFR NON-AFRICAN AMERICAN: 46
GLUCOSE BLD-MCNC: 169 MG/DL (ref 70–99)
POTASSIUM SERPL-SCNC: 5.2 MMOL/L (ref 3.5–5.1)
SODIUM BLD-SCNC: 132 MMOL/L (ref 136–145)

## 2018-02-28 PROCEDURE — 11046 DBRDMT MUSC&/FSCA EA ADDL: CPT | Performed by: INTERNAL MEDICINE

## 2018-02-28 PROCEDURE — 11045 DBRDMT SUBQ TISS EACH ADDL: CPT | Performed by: INTERNAL MEDICINE

## 2018-02-28 PROCEDURE — 11043 DBRDMT MUSC&/FSCA 1ST 20/<: CPT | Performed by: INTERNAL MEDICINE

## 2018-02-28 PROCEDURE — 17250 CHEM CAUT OF GRANLTJ TISSUE: CPT | Performed by: INTERNAL MEDICINE

## 2018-02-28 PROCEDURE — 11042 DBRDMT SUBQ TIS 1ST 20SQCM/<: CPT | Performed by: INTERNAL MEDICINE

## 2018-02-28 NOTE — PROGRESS NOTES
foot and T-spine ulcer(s) down through and including the removal of muscle/fascia. The type(s) of tissue debrided included fibrin, biofilm, slough and necrotic/eschar. Total Surface Area Debrided: approx 100 sq cm. Using a curette, I sharply debrided the right ischium and left leg ulcer(s) down through and including the removal of dermis. The type(s) of tissue debrided included fibrin, biofilm and necrotic/eschar. Total Surface Area Debrided: approx 25 sq cm.     Post  Debridement Measurements:  Wound 10/25/17 #29, right lateral foot, DFU, Mccauley 2 onset 10/25/17, pressure-Wound Length (cm): 7.7 cm  Wound 11/15/17 #31- R heel, pressure, Stage 4, onset 11/15/17, pressure-Wound Length (cm): 4.2 cm  Wound 02/13/17 #22 left lateral foot, Mccauley 3 DFU, onset 2/2017, surgical dehiscence -Wound Length (cm): 1.4 cm  Wound 08/16/17 #27- Thoracic spine, dehisced surgical wound, full thickness, onset 8/16/2017, pressure-Wound Length (cm): 9.2 cm  Wound 06/07/17 #24 Left lateral lower leg-cluster, Venous, full thickness,  (onset  6/5/17) unknown-Wound Length (cm): 12.5 cm  Wound 10/17/14  #8, right ischium, stage 4 PU, onset 7/15/14 (merged with #30)-Wound Length (cm): 8 cm    Wound 10/25/17 #29, right lateral foot, DFU, Mccauley 2 onset 10/25/17, pressure-Wound Width (cm): 3 cm  Wound 11/15/17 #31- R heel, pressure, Stage 4, onset 11/15/17, pressure-Wound Width (cm): 3 cm  Wound 02/13/17 #22 left lateral foot, Mccauley 3 DFU, onset 2/2017, surgical dehiscence -Wound Width (cm): 0.3 cm  Wound 08/16/17 #27- Thoracic spine, dehisced surgical wound, full thickness, onset 8/16/2017, pressure-Wound Width (cm): 6.5 cm  Wound 06/07/17 #24 Left lateral lower leg-cluster, Venous, full thickness,  (onset  6/5/17) unknown-Wound Width (cm): 1.5 cm  Wound 10/17/14  #8, right ischium, stage 4 PU, onset 7/15/14 (merged with #30)-Wound Width (cm): 3 cm    Wound 10/25/17 #29, right lateral foot, DFU, Mccauley 2 onset 10/25/17, pressure-Wound Depth (cm) : 0.9  Wound 11/15/17 #31- R heel, pressure, Stage 4, onset 11/15/17, pressure-Wound Depth (cm) : 0.4  Wound 02/13/17 #22 left lateral foot, Mccauley 3 DFU, onset 2/2017, surgical dehiscence -Wound Depth (cm) : 3.2  Wound 08/16/17 #27- Thoracic spine, dehisced surgical wound, full thickness, onset 8/16/2017, pressure-Wound Depth (cm) : 1.9  Wound 06/07/17 #24 Left lateral lower leg-cluster, Venous, full thickness,  (onset  6/5/17) unknown-Wound Depth (cm) : 0.7  Wound 10/17/14  #8, right ischium, stage 4 PU, onset 7/15/14 (merged with #30)-Wound Depth (cm) : 0.9    The ulcers were then irrigated with normal saline solution. The procedure was completed with a moderate amount of bleeding, and hemostasis was by pressure, by silver nitrate stick and ORC. The patient tolerated the procedure well, with no significant complications. The patient's level of pain during and after the procedure was monitored, and is noted in the wound documentation flowsheet. To encourage better epithelial cell coverage, I did use AgNO3 to chemically cauterize hypergranulation tissue on the buttocks and left leg ulcer(s), after application of 4% lidocaine topical solution. This was tolerated well, with no pain or skin injury.     Discharge plan:     Treatment in the wound care center today: Wound 10/25/17 #29, right lateral foot, DFU, Mccauley 2 onset 10/25/17, pressure-Dressing/Treatment:  (Zinc oxide,Aquacel Ag,gauze,Kerlix,sgl med Tubigrip)  Wound 11/15/17 #31- R heel, pressure, Stage 4, onset 11/15/17, pressure-Dressing/Treatment:  (Triad,Aquacel Ag,4x4,Kerlix)  Wound 02/13/17 #22 left lateral foot, Mccauley 3 DFU, onset 2/2017, surgical dehiscence -Dressing/Treatment:  (Zinc oxide,Aquacel Ag,Gauze,Kerlix)  Wound 08/16/17 #27- Thoracic spine, dehisced surgical wound, full thickness, onset 8/16/2017, pressure-Dressing/Treatment: Vacuum dressing (sorbact)  Wound 06/07/17 #24 Left lateral lower leg-cluster, Venous, full thickness,  (onset

## 2018-03-01 ENCOUNTER — HOSPITAL ENCOUNTER (OUTPATIENT)
Dept: OTHER | Age: 53
Discharge: HOME | End: 2018-03-31
Attending: INTERNAL MEDICINE | Admitting: INTERNAL MEDICINE

## 2018-03-04 PROBLEM — J10.1 INFLUENZA B: Status: ACTIVE | Noted: 2018-03-04

## 2018-03-04 PROBLEM — R79.89 TROPONIN LEVEL ELEVATED: Status: ACTIVE | Noted: 2018-03-04

## 2018-03-04 PROBLEM — I25.5 ISCHEMIC CARDIOMYOPATHY: Chronic | Status: ACTIVE | Noted: 2017-09-19

## 2018-03-04 PROBLEM — R77.8 TROPONIN LEVEL ELEVATED: Status: ACTIVE | Noted: 2018-03-04

## 2018-03-08 PROBLEM — L89.614 PRESSURE ULCER OF RIGHT HEEL, STAGE 4 (HCC): Status: ACTIVE | Noted: 2017-12-05

## 2018-03-08 NOTE — PROGRESS NOTES
88 [facility] Progress Note    Guilherme Cobian     : 1965    DATE OF VISIT:  2018    Subjective:     Guilherme Cobian is a 50 y.o. male who has a pressure ulcer located on the right ischium, right hip, both heels and right lateral foot. Significant symptoms or pertinent wound history since last visit: feeling ok overall, no F/C/D, no SOB or CP, eating well, glucoses well controlled. Generally doing well with offloading, but spent a couple of hours this week seated to go out to eat with his family, and unfortunately, his ischial ulcer suffered. Due for a number of F/U appts in the next couple of weeks (Dr. Rohit Mejia, cardiology, heart failure), and a first appt with his new PCP. Additional ulcer(s) noted? yes. T-spine surgical dehiscence, left foot diabetic / surgical, left leg diabetic / venous    His current medication list consists of Alcohol Wipes, Insulin Pen Needle, Insulin Syringe-Needle U-100, Lancets, apixaban, aspirin, clopidogrel, cyclobenzaprine, docusate sodium, ferrous sulfate, glucose blood VI test strips, insulin aspart, insulin glargine, magnesium oxide, metFORMIN, metoprolol succinate, metroNIDAZOLE, nitroGLYCERIN, nystatin, oxyCODONE, pantoprazole, promethazine, rosuvastatin, senna, sulfamethoxazole-trimethoprim (down to TIW), torsemide, and traZODone. Allergies: Benadryl [diphenhydramine hcl]; Statins [statins];  Cephalexin; Morphine; Penicillins; and Sulfa antibiotics    Objective:     Vitals:    18 1105 18 1129   BP: (!) 83/49 (!) 86/54   Pulse: 74 70   Resp: 17    Temp: 97 °F (36.1 °C)    TempSrc: Oral    Weight: 266 lb 14.4 oz (121.1 kg)    Height: 6' 2\" (1.88 m)      AAOx3, fatigued, not ill appearing, NAD  Intact distal pulses, mild-moderate LE lymphedema (most significant at right thigh), no cellulitis or angitis  No drug rash, icterus or thrush  Chetna-ulcer skin: generally pink and indurated, but ecchymotic and erythematous at ischium, lower extremities I89.0    Neurogenic bladder N31.9    Neurogenic bowel K59.2    Pressure ulcer of right buttock, stage 4 ([facility]) L89.314    Hypergranulation L92.9    Dehiscence of surgical wound of T-spine T81.31XA    Non-pressure chronic ulcer of left lower leg with bone involvement without evidence of necrosis L97.926    Onychomycosis B35.1    Dystrophic nail L60.3    Diabetic ulcer of left midfoot associated with type 2 diabetes mellitus, with necrosis of bone ([facility]) E11.621, L97.424    Peripheral venous insufficiency I87.2    Ischemic cardiomyopathy I25.5    Chronic systolic heart failure ([facility]) I50.22    Pressure ulcer of right foot, stage 4 ([facility]) L89.894    Pressure ulcer of right heel, stage 4 ([facility]) L89.614    Pressure ulcer of left heel, stage 4 ([facility]) V28.560    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 Factors contributing to occurrence and/or persistence of the chronic ulcer include lymphedema, diabetes, chronic pressure, decreased mobility, shear force, obesity and decreased tissue oxygenation. Medical necessity of today's visit is shown by the above documentation. Sharp debridement is indicated today, based upon the exam findings in the wound(s) above. Procedure note:     Consent obtained. Time out performed per [facility] policy. Anesthetic  Anesthetic: None     Using a curette, I sharply debrided the back ulcer(s) down through and including the removal of subcutaneous tissue.  The type(s) of tissue debrided included fibrin and necrotic/eschar. Total Surface Area Debrided: approx 50 sq cm. Using a curette, #15 blade scalpel, scissors and forceps, I sharply debrided the bilateral heel ulcer(s) down through and including the removal of muscle/fascia. The type(s) of tissue debrided included fibrin, slough and necrotic/eschar. Total Surface Area Debrided: approx 30 sq cm.     Post  Debridement Measurements:  Wound 10/17/14  #8, right ischium, stage 4 PU, onset 7/15/14 (merged with #30), pressure-Wound Length (cm): 9 cm  Wound 08/16/17 #27- Thoracic spine, dehisced surgical wound, full thickness, onset 8/16/2017, pressure-Wound Length (cm): 10 cm  Wound 10/25/17 #29, right lateral foot, DFU, Mccauley 2 onset 10/25/17, pressure-Wound Length (cm): 8.5 cm  Wound 02/13/17 #22 left lateral foot, Mccauley 3 DFU, onset 2/2017, surgical dehiscence -Wound Length (cm): 2.2 cm  Wound 06/07/17 #24 Left lateral lower leg-cluster, Venous, full thickness,  (onset  6/5/17) unknown-Wound Length (cm): 13.5 cm  Wound 11/15/17 #31- R heel, pressure, Stage 4, onset 11/15/17, pressure-Wound Length (cm): 6.1 cm    Wound 10/17/14  #8, right ischium, stage 4 PU, onset 7/15/14 (merged with #30), pressure-Wound Width (cm): 3 cm  Wound 08/16/17 #27- Thoracic spine, dehisced surgical wound, full thickness, onset 8/16/2017, pressure-Wound Width (cm): 6 cm  Wound 10/25/17 #29, right lateral foot, DFU, Mccauley 2 onset 10/25/17, pressure-Wound Width (cm): 3.5 cm  Wound 02/13/17 #22 left lateral foot, Mccauley 3 DFU, onset 2/2017, surgical dehiscence -Wound Width (cm): 0.3 cm  Wound 06/07/17 #24 Left lateral lower leg-cluster, Venous, full thickness,  (onset  6/5/17) unknown-Wound Width (cm): 1.4 cm  Wound 11/15/17 #31- R heel, pressure, Stage 4, onset 11/15/17, pressure-Wound Width (cm): 4.8 cm    Wound 10/17/14  #8, right ischium, stage 4 PU, onset 7/15/14 (merged with #30), pressure-Wound Depth (cm) : 2.5  Wound 08/16/17 #27- Thoracic spine, dehisced Ag,4x4,Mepilex Border)  Wound 06/07/17 #24 Left lateral lower leg-cluster, Venous, full thickness,  (onset  6/5/17) unknown-Dressing/Treatment:  (Zinc oxide,Aquagel Ag-moistened with NS, 4x4,ABD,Kerlix)  Wound 11/15/17 #31- R heel, pressure, Stage 4, onset 11/15/17, pressure-Dressing/Treatment:  (Triad,Aquacel Ag,4x4,Rancho). Right hip -- ZnO, iodoform, dry dressing. Left heel -- Triad, silver alginate, gauze, Kerlix, single Tubigrip. Continue Bactrim at TIW. No additional Kayexalate now; will repeat BMP next week; due to see heart failure team in a week or two. Keep up good work with glucoses and offloading; await word from [facility] on approval of bariatric home-care bed and low air loss mattress, as well as from another company on a new motorized wheelchair. I reminded the patient of the importance of weight management and smoking cessation, if applicable; also encouraged ambulation as tolerated, additional lower extremity exercises as instructed in our education sheet, leg elevation when at rest, and compliance with any recommended dietary, diuretic and compression therapies. Keep Tubigrip single-layer and with heel cut-outs. Home treatment: good handwashing before and after any dressing changes. Cleanse wound with saline or soap & water before dressing change. May use Vaseline (petrolatum), Aquaphor, Aveeno, CeraVe, Cetaphil, Eucerin, Lubriderm, etc for dry skin. Dressing type for home: Other: largely as above, daily, except the right hip and left lateral leg and foot can be TIW. Written discharge instructions given to patient. Follow up in 1 week.     Electronically signed by Michelle Gordon MD on 3/8/2018 at 1:45 PM.

## 2018-03-14 ENCOUNTER — HOSPITAL ENCOUNTER (OUTPATIENT)
Dept: WOUND CARE | Age: 53
Discharge: HOME | End: 2018-03-14
Attending: INTERNAL MEDICINE | Admitting: INTERNAL MEDICINE

## 2018-03-14 VITALS
SYSTOLIC BLOOD PRESSURE: 97 MMHG | DIASTOLIC BLOOD PRESSURE: 61 MMHG | HEART RATE: 76 BPM | HEIGHT: 74 IN | WEIGHT: 259 LBS | RESPIRATION RATE: 16 BRPM | TEMPERATURE: 97.7 F | BODY MASS INDEX: 33.24 KG/M2

## 2018-03-14 PROCEDURE — 11043 DBRDMT MUSC&/FSCA 1ST 20/<: CPT | Performed by: INTERNAL MEDICINE

## 2018-03-14 PROCEDURE — 17250 CHEM CAUT OF GRANLTJ TISSUE: CPT | Performed by: INTERNAL MEDICINE

## 2018-03-14 PROCEDURE — 11042 DBRDMT SUBQ TIS 1ST 20SQCM/<: CPT | Performed by: INTERNAL MEDICINE

## 2018-03-14 ASSESSMENT — PAIN SCALES - GENERAL: PAINLEVEL_OUTOF10: 0

## 2018-03-16 ENCOUNTER — OFFICE VISIT (OUTPATIENT)
Dept: INTERNAL MEDICINE CLINIC | Age: 53
End: 2018-03-16

## 2018-03-16 VITALS
HEIGHT: 74 IN | HEART RATE: 55 BPM | RESPIRATION RATE: 18 BRPM | SYSTOLIC BLOOD PRESSURE: 90 MMHG | DIASTOLIC BLOOD PRESSURE: 65 MMHG

## 2018-03-16 DIAGNOSIS — Z76.89 ENCOUNTER TO ESTABLISH CARE: Primary | ICD-10-CM

## 2018-03-16 DIAGNOSIS — G89.29 CHRONIC BACK PAIN, UNSPECIFIED BACK LOCATION, UNSPECIFIED BACK PAIN LATERALITY: Chronic | ICD-10-CM

## 2018-03-16 DIAGNOSIS — R79.89 ABNORMAL TSH: ICD-10-CM

## 2018-03-16 DIAGNOSIS — M54.9 CHRONIC BACK PAIN, UNSPECIFIED BACK LOCATION, UNSPECIFIED BACK PAIN LATERALITY: Chronic | ICD-10-CM

## 2018-03-16 DIAGNOSIS — I50.22 CHRONIC SYSTOLIC HEART FAILURE (HCC): Chronic | ICD-10-CM

## 2018-03-16 DIAGNOSIS — Z79.4 TYPE 2 DIABETES MELLITUS WITH OTHER SKIN ULCER, WITH LONG-TERM CURRENT USE OF INSULIN (HCC): Chronic | ICD-10-CM

## 2018-03-16 DIAGNOSIS — E78.2 MIXED HYPERLIPIDEMIA: Chronic | ICD-10-CM

## 2018-03-16 DIAGNOSIS — E11.622 TYPE 2 DIABETES MELLITUS WITH OTHER SKIN ULCER, WITH LONG-TERM CURRENT USE OF INSULIN (HCC): Chronic | ICD-10-CM

## 2018-03-16 DIAGNOSIS — I25.5 ISCHEMIC CARDIOMYOPATHY: Chronic | ICD-10-CM

## 2018-03-16 PROCEDURE — 99214 OFFICE O/P EST MOD 30 MIN: CPT | Performed by: INTERNAL MEDICINE

## 2018-03-16 ASSESSMENT — ENCOUNTER SYMPTOMS
RHINORRHEA: 0
SHORTNESS OF BREATH: 0
COLOR CHANGE: 0
CHEST TIGHTNESS: 0
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
## 2018-03-19 DIAGNOSIS — E11.622 TYPE 2 DIABETES MELLITUS WITH OTHER SKIN ULCER, WITH LONG-TERM CURRENT USE OF INSULIN (HCC): ICD-10-CM

## 2018-03-19 DIAGNOSIS — Z79.4 TYPE 2 DIABETES MELLITUS WITH OTHER SKIN ULCER, WITH LONG-TERM CURRENT USE OF INSULIN (HCC): ICD-10-CM

## 2018-03-19 RX ORDER — INSULIN GLARGINE 100 [IU]/ML
50 INJECTION, SOLUTION SUBCUTANEOUS 2 TIMES DAILY
Qty: 3 VIAL | Refills: 0 | Status: SHIPPED | OUTPATIENT
Start: 2018-03-19 | End: 2018-03-20 | Stop reason: SDUPTHER

## 2018-03-20 DIAGNOSIS — Z79.4 TYPE 2 DIABETES MELLITUS WITH OTHER SKIN ULCER, WITH LONG-TERM CURRENT USE OF INSULIN (HCC): ICD-10-CM

## 2018-03-20 DIAGNOSIS — E11.622 TYPE 2 DIABETES MELLITUS WITH OTHER SKIN ULCER, WITH LONG-TERM CURRENT USE OF INSULIN (HCC): ICD-10-CM

## 2018-03-20 RX ORDER — INSULIN GLARGINE 100 [IU]/ML
50 INJECTION, SOLUTION SUBCUTANEOUS 2 TIMES DAILY
Qty: 3 VIAL | Refills: 0 | Status: SHIPPED | OUTPATIENT
Start: 2018-03-20 | End: 2018-05-19 | Stop reason: SDUPTHER

## 2018-03-21 ENCOUNTER — HOSPITAL ENCOUNTER (OUTPATIENT)
Dept: WOUND CARE | Age: 53
Discharge: HOME | End: 2018-03-21
Attending: INTERNAL MEDICINE | Admitting: INTERNAL MEDICINE

## 2018-03-21 VITALS
BODY MASS INDEX: 34.33 KG/M2 | WEIGHT: 267.5 LBS | HEART RATE: 72 BPM | RESPIRATION RATE: 18 BRPM | TEMPERATURE: 96.7 F | SYSTOLIC BLOOD PRESSURE: 109 MMHG | DIASTOLIC BLOOD PRESSURE: 63 MMHG | HEIGHT: 74 IN

## 2018-03-21 DIAGNOSIS — E11.51 TYPE II DIABETES MELLITUS WITH PERIPHERAL CIRCULATORY DISORDER (HCC): ICD-10-CM

## 2018-03-21 PROCEDURE — 17250 CHEM CAUT OF GRANLTJ TISSUE: CPT | Performed by: INTERNAL MEDICINE

## 2018-03-21 PROCEDURE — 97598 DBRDMT OPN WND ADDL 20CM/<: CPT | Performed by: INTERNAL MEDICINE

## 2018-03-21 PROCEDURE — 97606 NEG PRS WND THER DME>50 SQCM: CPT | Performed by: INTERNAL MEDICINE

## 2018-03-21 PROCEDURE — 97597 DBRDMT OPN WND 1ST 20 CM/<: CPT | Performed by: INTERNAL MEDICINE

## 2018-03-21 PROCEDURE — G0180 MD CERTIFICATION HHA PATIENT: HCPCS | Performed by: INTERNAL MEDICINE

## 2018-03-21 ASSESSMENT — PAIN DESCRIPTION - PROGRESSION: CLINICAL_PROGRESSION: NOT CHANGED

## 2018-03-21 ASSESSMENT — PAIN DESCRIPTION - ORIENTATION: ORIENTATION: RIGHT;LEFT;MID

## 2018-03-21 ASSESSMENT — ACTIVITIES OF DAILY LIVING (ADL): EFFECT OF PAIN ON DAILY ACTIVITIES: DENIES

## 2018-03-21 ASSESSMENT — PAIN DESCRIPTION - PAIN TYPE: TYPE: CHRONIC PAIN

## 2018-03-21 ASSESSMENT — PAIN DESCRIPTION - ONSET: ONSET: ON-GOING

## 2018-03-21 ASSESSMENT — PAIN DESCRIPTION - DESCRIPTORS: DESCRIPTORS: ACHING;CONSTANT;STABBING

## 2018-03-21 ASSESSMENT — PAIN SCALES - GENERAL: PAINLEVEL_OUTOF10: 3

## 2018-03-21 ASSESSMENT — PAIN DESCRIPTION - FREQUENCY: FREQUENCY: CONTINUOUS

## 2018-03-21 ASSESSMENT — PAIN DESCRIPTION - LOCATION: LOCATION: NECK;SHOULDER

## 2018-03-22 PROBLEM — R79.89 TROPONIN LEVEL ELEVATED: Status: RESOLVED | Noted: 2018-03-04 | Resolved: 2018-03-22

## 2018-03-22 PROBLEM — I21.4 NSTEMI (NON-ST ELEVATED MYOCARDIAL INFARCTION) (HCC): Status: RESOLVED | Noted: 2017-09-28 | Resolved: 2018-03-22

## 2018-03-22 PROBLEM — J10.1 INFLUENZA B: Status: RESOLVED | Noted: 2018-03-04 | Resolved: 2018-03-22

## 2018-03-22 PROBLEM — R77.8 TROPONIN LEVEL ELEVATED: Status: RESOLVED | Noted: 2018-03-04 | Resolved: 2018-03-22

## 2018-03-22 RX ORDER — CLOPIDOGREL BISULFATE 75 MG/1
TABLET ORAL
Qty: 90 TABLET | Refills: 0 | Status: SHIPPED | OUTPATIENT
Start: 2018-03-22 | End: 2018-04-23 | Stop reason: SDUPTHER

## 2018-03-22 NOTE — PROGRESS NOTES
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 L97.926    Onychomycosis B35.1    Dystrophic nail L60.3    Diabetic ulcer of left midfoot associated with type 2 diabetes mellitus, with necrosis of bone (HCC) E11.621, L97.424    Peripheral venous insufficiency I87.2    Ischemic cardiomyopathy I25.5    Chronic systolic heart failure (HCC) I50.22    Pressure ulcer of right foot, stage 4 (HCC) L89.894    Pressure ulcer of right heel, stage 4 (HCC) L89.614    Pressure ulcer of left heel, stage 4 (HCC) V92.609    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 Factors contributing to occurrence and/or persistence of the chronic ulcer include lymphedema, diabetes, chronic pressure, decreased mobility, shear force, obesity and decreased tissue oxygenation. Medical necessity of today's visit is shown by the above documentation. Sharp debridement is indicated today, based upon the exam findings in the wound(s) above. Procedure note:     Consent obtained. Time out performed per [facility] policy. Anesthetic  Anesthetic: None     Using a curette, I sharply debrided the back, buttocks and left leg ulcer(s) down through and including the removal of dermis. The type(s) of tissue debrided included fibrin, biofilm, necrotic/eschar and exudate. Total Surface Area Debrided: approx 50 sq cm.     Using a curette, scissors and forceps, I sharply debrided the left heel ulcer(s) down through and including the removal of muscle/fascia. The type(s) of tissue debrided included fibrin, slough and necrotic/eschar. Total Surface Area Debrided: 15 sq cm. Using a curette, I sharply debrided the right heel ulcer(s) down through and including the removal of subcutaneous tissue. The type(s) of tissue debrided included fibrin, slough and necrotic/eschar. Total Surface Area Debrided: 10 sq cm.     Post  Debridement Measurements:  Wound 10/25/17 #29, right lateral foot, DFU, Mccauley 2 onset 10/25/17, pressure-Wound Length (cm): 7 cm  Wound 11/15/17 #31- R heel, pressure, Stage 4, onset 11/15/17, pressure-Wound Length (cm): 6.5 cm  Wound 02/13/17 #22 left lateral foot, Mccauley 3 DFU, onset 2/2017, surgical dehiscence -Wound Length (cm): 1 cm  Wound 06/07/17 #24 Left lateral lower leg-cluster, Venous, full thickness,  (onset  6/5/17) unknown-Wound Length (cm): 10 cm  Wound 08/16/17 #27- Thoracic spine, dehisced surgical wound, full thickness, onset 8/16/2017, pressure-Wound Length (cm): 9.5 cm  Wound 10/17/14  #8, right ischium, stage 4 PU, onset 7/15/14 (merged with #30), pressure-Wound Length (cm): 8.8 cm    Wound 10/25/17 #29, right lateral foot, DFU, Mccauley 2 onset 10/25/17, pressure-Wound Width (cm): 3 cm  Wound 11/15/17 #31- R heel, pressure, Stage 4, onset 11/15/17, pressure-Wound Width (cm): 4 cm  Wound 02/13/17 #22 left lateral foot, Mccauley 3 DFU, onset 2/2017, surgical dehiscence -Wound Width (cm): 0.2 cm  Wound 06/07/17 #24 Left lateral lower leg-cluster, Venous, full thickness,  (onset  6/5/17) unknown-Wound Width (cm): 1.2 cm  Wound 08/16/17 #27- Thoracic spine, dehisced surgical wound, full thickness, onset 8/16/2017, pressure-Wound Width (cm): 7 cm  Wound 10/17/14  #8, right ischium, stage 4 PU, onset 7/15/14 (merged with #30), pressure-Wound Width (cm): 2 cm    Wound 10/25/17 #29, right lateral foot, DFU, Mccauley 2 onset 10/25/17, pressure-Wound Depth (cm) : 0.3  Wound 11/15/17

## 2018-03-28 ENCOUNTER — TELEPHONE (OUTPATIENT)
Dept: FAMILY MEDICINE CLINIC | Age: 53
End: 2018-03-28

## 2018-03-28 ENCOUNTER — HOSPITAL ENCOUNTER (OUTPATIENT)
Dept: WOUND CARE | Age: 53
Discharge: HOME | End: 2018-03-28
Attending: INTERNAL MEDICINE | Admitting: INTERNAL MEDICINE

## 2018-03-28 VITALS
SYSTOLIC BLOOD PRESSURE: 113 MMHG | RESPIRATION RATE: 16 BRPM | DIASTOLIC BLOOD PRESSURE: 71 MMHG | HEIGHT: 74 IN | HEART RATE: 98 BPM | TEMPERATURE: 96.9 F

## 2018-03-28 DIAGNOSIS — I11.0 HYPERTENSIVE HEART DISEASE WITH HEART FAILURE (HCC): Primary | ICD-10-CM

## 2018-03-28 PROCEDURE — 97597 DBRDMT OPN WND 1ST 20 CM/<: CPT | Performed by: INTERNAL MEDICINE

## 2018-03-28 PROCEDURE — 17250 CHEM CAUT OF GRANLTJ TISSUE: CPT | Performed by: INTERNAL MEDICINE

## 2018-03-28 PROCEDURE — 97598 DBRDMT OPN WND ADDL 20CM/<: CPT | Performed by: INTERNAL MEDICINE

## 2018-03-28 RX ORDER — TRIAMCINOLONE ACETONIDE 1 MG/G
CREAM TOPICAL
Qty: 45 G | Refills: 1 | Status: ON HOLD | OUTPATIENT
Start: 2018-03-28 | End: 2018-05-09 | Stop reason: CLARIF

## 2018-03-28 ASSESSMENT — PAIN DESCRIPTION - LOCATION: LOCATION: SHOULDER

## 2018-03-28 ASSESSMENT — PAIN DESCRIPTION - ONSET: ONSET: ON-GOING

## 2018-03-28 ASSESSMENT — PAIN DESCRIPTION - DESCRIPTORS: DESCRIPTORS: STABBING

## 2018-03-28 ASSESSMENT — PAIN DESCRIPTION - FREQUENCY: FREQUENCY: CONTINUOUS

## 2018-03-28 ASSESSMENT — PAIN DESCRIPTION - PAIN TYPE: TYPE: CHRONIC PAIN

## 2018-03-28 ASSESSMENT — PAIN DESCRIPTION - ORIENTATION: ORIENTATION: LEFT;RIGHT

## 2018-03-28 ASSESSMENT — PAIN DESCRIPTION - PROGRESSION: CLINICAL_PROGRESSION: NOT CHANGED

## 2018-03-28 ASSESSMENT — PAIN SCALES - GENERAL: PAINLEVEL_OUTOF10: 3

## 2018-03-30 NOTE — PROGRESS NOTES
mild erythema and ecchymotic changes at ischium. Ulcer(s): T-spine wound with the last visible screw heads slowly getting covered by granulation; most wound bed pink and inflamed granulation, still one tiny focus of exposed fascia, wound with overlying fibrinous necrosis, serous exudate; ischium slowly epithelializing, a bit of deep fibrosis, mostly granular to hypergranular; right hip sinus tract stable; right heel smaller, red, hypergranular, quite clean; right lateral foot mostly granular, a bit smaller, still a deep and friable tract along peroneal tendon; left heel mostly red and hypergranular, two small areas of sloughy tissue, bone not clearly exposed; left foot stable deep sinus tract with cloudy drainage; left leg a bit smaller, mostly red and hypergranular, bone thinly covered, some loose fibrin +/- biofilm, serous exudate. Pre-debridement wound measurements are in the wound documentation flowsheet. Photos also saved in electronic chart. 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 7XXA    Iron deficiency anemia due to chronic blood loss D50.0    Lymphedema of both lower extremities I89.0    Neurogenic bladder N31.9    Neurogenic bowel K59.2    Pressure ulcer of right buttock, stage 4 ([facility]) L89.314    Hypergranulation L92.9    Dehiscence of surgical wound of T-spine T81.31XA    Non-pressure chronic ulcer of left lower leg with bone involvement without and hemostasis was by pressure. The patient tolerated the procedure well, with no significant complications. The patient's level of pain during and after the procedure was monitored, and is noted in the wound documentation flowsheet.  __________________    To encourage better epithelial cell coverage, I did use AgNO3 to chemically cauterize hypergranulation tissue on the B/L heel, left leg and right ischium ulcer(s), after application of 4% lidocaine topical solution. This was tolerated well, with no pain or skin injury. Discharge plan:     Treatment in the wound care center today: Wound 06/07/17 #24 Left lateral lower leg-cluster, Venous, full thickness,  (onset  6/5/17) unknown-Dressing/Treatment: Silver dressing, Triad hydro (kerlix, e-tubigrip)  Wound 11/15/17 #31- R heel, pressure, Stage 4, onset 11/15/17, pressure-Dressing/Treatment: Dry dressing, Silver dressing, Triad hydro (kerlix, e-tubigrip- single w/ heel cut out)  Wound 10/25/17 #29, right lateral foot, DFU, Mccauley 2 onset 10/25/17, pressure-Dressing/Treatment: Dry dressing, Silver dressing, Triad hydro (kerlix, e-tubigrip)  Wound 02/13/17 #22 left lateral foot, Mccauley 3 DFU, onset 2/2017, surgical dehiscence -Dressing/Treatment: Dry dressing, Silver dressing, Triad hydro (kerlix, e- tubigrip, )  Wound 08/16/17 #27- Thoracic spine, dehisced surgical wound, full thickness, onset 8/16/2017, pressure-Dressing/Treatment: Vacuum dressing (sorbact around black foam)  Wound 10/17/14  #8, right ischium, stage 4 PU, onset 7/15/14 (merged with #30), pressure-Dressing/Treatment: Silicone dressing, Silver dressing, Triad hydro (cigar gauze bolster). See Epic flowsheets for dressings on right hip and left heel. Continue Tubigrip for LE compression, with cut-outs at heels. Await word from DME company on motorized chair, and from insurance company on hospital bed and low air loss mattress. Prevalon boots to heels at all times. Continue TIW Bactrim.

## 2018-04-04 ENCOUNTER — HOSPITAL ENCOUNTER (OUTPATIENT)
Dept: WOUND CARE | Age: 53
Discharge: HOME | End: 2018-04-04
Attending: INTERNAL MEDICINE | Admitting: INTERNAL MEDICINE

## 2018-04-04 VITALS
BODY MASS INDEX: 33.11 KG/M2 | SYSTOLIC BLOOD PRESSURE: 110 MMHG | DIASTOLIC BLOOD PRESSURE: 67 MMHG | HEIGHT: 74 IN | RESPIRATION RATE: 17 BRPM | HEART RATE: 89 BPM | WEIGHT: 258 LBS | TEMPERATURE: 96.5 F

## 2018-04-04 PROCEDURE — 97597 DBRDMT OPN WND 1ST 20 CM/<: CPT | Performed by: INTERNAL MEDICINE

## 2018-04-04 PROCEDURE — 97598 DBRDMT OPN WND ADDL 20CM/<: CPT | Performed by: INTERNAL MEDICINE

## 2018-04-04 PROCEDURE — 17250 CHEM CAUT OF GRANLTJ TISSUE: CPT | Performed by: INTERNAL MEDICINE

## 2018-04-04 PROCEDURE — 11043 DBRDMT MUSC&/FSCA 1ST 20/<: CPT | Performed by: INTERNAL MEDICINE

## 2018-04-04 ASSESSMENT — PAIN DESCRIPTION - LOCATION: LOCATION: SHOULDER

## 2018-04-04 ASSESSMENT — PAIN DESCRIPTION - PAIN TYPE: TYPE: CHRONIC PAIN

## 2018-04-04 ASSESSMENT — PAIN SCALES - GENERAL: PAINLEVEL_OUTOF10: 3

## 2018-04-04 ASSESSMENT — PAIN DESCRIPTION - ORIENTATION: ORIENTATION: RIGHT;LEFT

## 2018-04-04 ASSESSMENT — PAIN DESCRIPTION - ONSET: ONSET: ON-GOING

## 2018-04-04 ASSESSMENT — PAIN DESCRIPTION - FREQUENCY: FREQUENCY: CONTINUOUS

## 2018-04-04 ASSESSMENT — PAIN DESCRIPTION - DESCRIPTORS: DESCRIPTORS: STABBING

## 2018-04-05 NOTE — PROGRESS NOTES
procedure was completed with a moderate amount of bleeding, and hemostasis was by pressure and by silver nitrate stick. The patient tolerated the procedure well, with no significant complications. The patient's level of pain during and after the procedure was monitored, and is noted in the wound documentation flowsheet.  _____________________    To encourage better epithelial cell coverage, I did use AgNO3 to chemically cauterize hypergranulation tissue on the right ischium, right heel, left heel, right lateral foot ulcer(s), after application of 4% lidocaine topical solution. This was tolerated well, with no pain or skin injury. Discharge plan:     Treatment in the wound care center today: Wound 10/25/17 #29, right lateral foot, DFU, Mccauley 2 onset 10/25/17, pressure-Dressing/Treatment: Dry dressing, Moisture barrier, Silver dressing ( e tubi)  Wound 11/15/17 #31- R heel, pressure, Stage 4, onset 11/15/17, pressure-Dressing/Treatment: ABD, Dry dressing, Silver dressing, 4x4 (e-tubi )  Wound 08/16/17 #27- Thoracic spine, dehisced surgical wound, full thickness, onset 8/16/2017, pressure-Dressing/Treatment: Vacuum dressing (sorbact around black foam)  Wound 10/17/14  #8, right ischium, stage 4 PU, onset 7/15/14 (merged with #30), pressure-Dressing/Treatment: Dry dressing, Silver dressing, Triad hydro (cigar gauze bolster)  Wound 02/13/17 #22 left lateral foot, Mccauley 3 DFU, onset 2/2017, surgical dehiscence -Dressing/Treatment: Dry dressing, Moisture barrier, Silver dressing (e tubi)  Wound 06/07/17 #24 Left lateral lower leg-cluster, Venous, full thickness,  (onset  6/5/17) unknown-Dressing/Treatment: Dry dressing, Silver dressing (kerlix, tubi e). Right hip stage 4 PU: ZnO, iodoform packing, Mepilex border. Left heel stage 4 PU: Triad to periwound and small areas of necrosis, then silver alginate, gauze, Kerlix and Tubigrip (with other LLE wounds). Continue Bactrim TIW.    Await DPM re-evaluation Monday --

## 2018-04-11 ENCOUNTER — HOSPITAL ENCOUNTER (OUTPATIENT)
Dept: WOUND CARE | Age: 53
Discharge: HOME | End: 2018-04-11
Attending: INTERNAL MEDICINE | Admitting: INTERNAL MEDICINE

## 2018-04-11 VITALS
RESPIRATION RATE: 18 BRPM | WEIGHT: 253.3 LBS | TEMPERATURE: 97.1 F | SYSTOLIC BLOOD PRESSURE: 84 MMHG | HEART RATE: 83 BPM | BODY MASS INDEX: 32.51 KG/M2 | DIASTOLIC BLOOD PRESSURE: 55 MMHG | HEIGHT: 74 IN

## 2018-04-11 PROCEDURE — 97597 DBRDMT OPN WND 1ST 20 CM/<: CPT | Performed by: INTERNAL MEDICINE

## 2018-04-11 PROCEDURE — 11043 DBRDMT MUSC&/FSCA 1ST 20/<: CPT | Performed by: INTERNAL MEDICINE

## 2018-04-11 PROCEDURE — 17250 CHEM CAUT OF GRANLTJ TISSUE: CPT | Performed by: INTERNAL MEDICINE

## 2018-04-11 PROCEDURE — 97598 DBRDMT OPN WND ADDL 20CM/<: CPT | Performed by: INTERNAL MEDICINE

## 2018-04-18 ENCOUNTER — HOSPITAL ENCOUNTER (OUTPATIENT)
Dept: WOUND CARE | Age: 53
Discharge: HOME | End: 2018-04-18
Attending: INTERNAL MEDICINE | Admitting: INTERNAL MEDICINE

## 2018-04-18 VITALS — HEIGHT: 75 IN | SYSTOLIC BLOOD PRESSURE: 90 MMHG | DIASTOLIC BLOOD PRESSURE: 51 MMHG | TEMPERATURE: 96.7 F

## 2018-04-18 PROCEDURE — 97598 DBRDMT OPN WND ADDL 20CM/<: CPT | Performed by: INTERNAL MEDICINE

## 2018-04-18 PROCEDURE — 97597 DBRDMT OPN WND 1ST 20 CM/<: CPT | Performed by: INTERNAL MEDICINE

## 2018-04-18 PROCEDURE — 97606 NEG PRS WND THER DME>50 SQCM: CPT | Performed by: INTERNAL MEDICINE

## 2018-04-18 PROCEDURE — 17250 CHEM CAUT OF GRANLTJ TISSUE: CPT | Performed by: INTERNAL MEDICINE

## 2018-04-23 DIAGNOSIS — E11.51 TYPE II DIABETES MELLITUS WITH PERIPHERAL CIRCULATORY DISORDER (HCC): ICD-10-CM

## 2018-04-23 DIAGNOSIS — D63.8 ANEMIA OF CHRONIC DISORDER: ICD-10-CM

## 2018-04-23 RX ORDER — CLOPIDOGREL BISULFATE 75 MG/1
TABLET ORAL
Qty: 90 TABLET | Refills: 0 | Status: ON HOLD | OUTPATIENT
Start: 2018-04-23 | End: 2018-07-31 | Stop reason: HOSPADM

## 2018-04-23 RX ORDER — FERROUS SULFATE 325(65) MG
TABLET ORAL
Qty: 270 TABLET | Refills: 0 | Status: SHIPPED | OUTPATIENT
Start: 2018-04-23 | End: 2018-08-09 | Stop reason: SDUPTHER

## 2018-04-25 ENCOUNTER — HOSPITAL ENCOUNTER (OUTPATIENT)
Dept: WOUND CARE | Age: 53
Discharge: HOME | End: 2018-04-25
Attending: INTERNAL MEDICINE | Admitting: INTERNAL MEDICINE

## 2018-04-25 VITALS
HEART RATE: 80 BPM | SYSTOLIC BLOOD PRESSURE: 106 MMHG | RESPIRATION RATE: 18 BRPM | TEMPERATURE: 97 F | DIASTOLIC BLOOD PRESSURE: 83 MMHG

## 2018-04-25 PROCEDURE — 97597 DBRDMT OPN WND 1ST 20 CM/<: CPT | Performed by: INTERNAL MEDICINE

## 2018-04-25 PROCEDURE — 97598 DBRDMT OPN WND ADDL 20CM/<: CPT | Performed by: INTERNAL MEDICINE

## 2018-04-25 PROCEDURE — 11043 DBRDMT MUSC&/FSCA 1ST 20/<: CPT | Performed by: INTERNAL MEDICINE

## 2018-04-25 PROCEDURE — 17250 CHEM CAUT OF GRANLTJ TISSUE: CPT | Performed by: INTERNAL MEDICINE

## 2018-04-25 ASSESSMENT — PAIN SCALES - GENERAL: PAINLEVEL_OUTOF10: 3

## 2018-04-25 ASSESSMENT — PAIN DESCRIPTION - FREQUENCY: FREQUENCY: CONTINUOUS

## 2018-04-25 ASSESSMENT — PAIN SCALES - WONG BAKER: WONGBAKER_NUMERICALRESPONSE: 0

## 2018-04-25 ASSESSMENT — PAIN DESCRIPTION - ORIENTATION: ORIENTATION: MID

## 2018-04-25 ASSESSMENT — PAIN DESCRIPTION - LOCATION: LOCATION: NECK

## 2018-04-25 ASSESSMENT — PAIN DESCRIPTION - PROGRESSION: CLINICAL_PROGRESSION: NOT CHANGED

## 2018-04-25 ASSESSMENT — PAIN DESCRIPTION - DESCRIPTORS: DESCRIPTORS: ACHING

## 2018-04-25 ASSESSMENT — PAIN DESCRIPTION - PAIN TYPE: TYPE: CHRONIC PAIN

## 2018-04-25 ASSESSMENT — PAIN DESCRIPTION - ONSET: ONSET: ON-GOING

## 2018-05-02 ENCOUNTER — OFFICE VISIT (OUTPATIENT)
Dept: INTERNAL MEDICINE CLINIC | Age: 53
End: 2018-05-02

## 2018-05-02 ENCOUNTER — HOSPITAL ENCOUNTER (OUTPATIENT)
Dept: WOUND CARE | Age: 53
Discharge: HOME | End: 2018-05-02
Attending: INTERNAL MEDICINE | Admitting: INTERNAL MEDICINE

## 2018-05-02 VITALS
SYSTOLIC BLOOD PRESSURE: 95 MMHG | RESPIRATION RATE: 18 BRPM | TEMPERATURE: 96.8 F | DIASTOLIC BLOOD PRESSURE: 61 MMHG | HEIGHT: 75 IN | HEART RATE: 76 BPM

## 2018-05-02 VITALS — RESPIRATION RATE: 14 BRPM | SYSTOLIC BLOOD PRESSURE: 96 MMHG | DIASTOLIC BLOOD PRESSURE: 60 MMHG | HEART RATE: 56 BPM

## 2018-05-02 DIAGNOSIS — K21.9 GASTROESOPHAGEAL REFLUX DISEASE WITHOUT ESOPHAGITIS: ICD-10-CM

## 2018-05-02 DIAGNOSIS — T81.31XA DEHISCENCE OF SURGICAL WOUND, INITIAL ENCOUNTER: ICD-10-CM

## 2018-05-02 DIAGNOSIS — E11.69 DIABETIC FOOT ULCER WITH OSTEOMYELITIS (HCC): Primary | ICD-10-CM

## 2018-05-02 DIAGNOSIS — M86.9 DIABETIC FOOT ULCER WITH OSTEOMYELITIS (HCC): Primary | ICD-10-CM

## 2018-05-02 DIAGNOSIS — G82.20 PARAPLEGIA (HCC): ICD-10-CM

## 2018-05-02 DIAGNOSIS — I50.22 CHRONIC SYSTOLIC HEART FAILURE (HCC): ICD-10-CM

## 2018-05-02 DIAGNOSIS — T84.7XXA INFECTED HARDWARE IN LEFT LEG, INITIAL ENCOUNTER (HCC): ICD-10-CM

## 2018-05-02 DIAGNOSIS — M54.9 CHRONIC BACK PAIN, UNSPECIFIED BACK LOCATION, UNSPECIFIED BACK PAIN LATERALITY: ICD-10-CM

## 2018-05-02 DIAGNOSIS — G47.00 INSOMNIA, UNSPECIFIED TYPE: ICD-10-CM

## 2018-05-02 DIAGNOSIS — Z79.4 TYPE 2 DIABETES MELLITUS WITH OTHER SKIN ULCER, WITH LONG-TERM CURRENT USE OF INSULIN (HCC): ICD-10-CM

## 2018-05-02 DIAGNOSIS — E78.2 MIXED HYPERLIPIDEMIA: ICD-10-CM

## 2018-05-02 DIAGNOSIS — K59.01 CONSTIPATION BY DELAYED COLONIC TRANSIT: ICD-10-CM

## 2018-05-02 DIAGNOSIS — E11.622 TYPE 2 DIABETES MELLITUS WITH OTHER SKIN ULCER, WITH LONG-TERM CURRENT USE OF INSULIN (HCC): ICD-10-CM

## 2018-05-02 DIAGNOSIS — I10 ESSENTIAL HYPERTENSION: ICD-10-CM

## 2018-05-02 DIAGNOSIS — E11.621 DIABETIC FOOT ULCER WITH OSTEOMYELITIS (HCC): Primary | ICD-10-CM

## 2018-05-02 DIAGNOSIS — L98.491 CHRONIC SKIN ULCER, LIMITED TO BREAKDOWN OF SKIN (HCC): ICD-10-CM

## 2018-05-02 DIAGNOSIS — Z01.818 PRE-OPERATIVE EXAMINATION FOR INTERNAL MEDICINE: ICD-10-CM

## 2018-05-02 DIAGNOSIS — L97.509 DIABETIC FOOT ULCER WITH OSTEOMYELITIS (HCC): Primary | ICD-10-CM

## 2018-05-02 DIAGNOSIS — D63.8 ANEMIA OF CHRONIC DISORDER: ICD-10-CM

## 2018-05-02 DIAGNOSIS — I25.10 CORONARY ARTERY DISEASE INVOLVING NATIVE CORONARY ARTERY OF NATIVE HEART WITHOUT ANGINA PECTORIS: ICD-10-CM

## 2018-05-02 DIAGNOSIS — M54.2 ACUTE NECK PAIN: Primary | ICD-10-CM

## 2018-05-02 DIAGNOSIS — I25.5 ISCHEMIC CARDIOMYOPATHY: ICD-10-CM

## 2018-05-02 DIAGNOSIS — G89.29 CHRONIC BACK PAIN, UNSPECIFIED BACK LOCATION, UNSPECIFIED BACK PAIN LATERALITY: ICD-10-CM

## 2018-05-02 LAB
BILIRUBIN, POC: NORMAL
BLOOD URINE, POC: NORMAL
CLARITY, POC: NORMAL
COLOR, POC: NORMAL
GLUCOSE URINE, POC: NORMAL
KETONES, POC: NORMAL
LEUKOCYTE EST, POC: NORMAL
NITRITE, POC: NORMAL
PH, POC: NORMAL
PROTEIN, POC: NORMAL
SPECIFIC GRAVITY, POC: NORMAL
UROBILINOGEN, POC: NORMAL

## 2018-05-02 PROCEDURE — 97597 DBRDMT OPN WND 1ST 20 CM/<: CPT | Performed by: INTERNAL MEDICINE

## 2018-05-02 PROCEDURE — 97598 DBRDMT OPN WND ADDL 20CM/<: CPT | Performed by: INTERNAL MEDICINE

## 2018-05-02 PROCEDURE — 93005 ELECTROCARDIOGRAM TRACING: CPT | Performed by: NURSE PRACTITIONER

## 2018-05-02 PROCEDURE — 93010 ELECTROCARDIOGRAM REPORT: CPT | Performed by: NURSE PRACTITIONER

## 2018-05-02 PROCEDURE — 3017F COLORECTAL CA SCREEN DOC REV: CPT | Performed by: NURSE PRACTITIONER

## 2018-05-02 PROCEDURE — 2022F DILAT RTA XM EVC RTNOPTHY: CPT | Performed by: NURSE PRACTITIONER

## 2018-05-02 PROCEDURE — G8417 CALC BMI ABV UP PARAM F/U: HCPCS | Performed by: NURSE PRACTITIONER

## 2018-05-02 PROCEDURE — 81002 URINALYSIS NONAUTO W/O SCOPE: CPT | Performed by: NURSE PRACTITIONER

## 2018-05-02 PROCEDURE — 3044F HG A1C LEVEL LT 7.0%: CPT | Performed by: NURSE PRACTITIONER

## 2018-05-02 PROCEDURE — 1036F TOBACCO NON-USER: CPT | Performed by: NURSE PRACTITIONER

## 2018-05-02 PROCEDURE — 17250 CHEM CAUT OF GRANLTJ TISSUE: CPT | Performed by: INTERNAL MEDICINE

## 2018-05-02 PROCEDURE — G8427 DOCREV CUR MEDS BY ELIG CLIN: HCPCS | Performed by: NURSE PRACTITIONER

## 2018-05-02 PROCEDURE — 99214 OFFICE O/P EST MOD 30 MIN: CPT | Performed by: NURSE PRACTITIONER

## 2018-05-02 PROCEDURE — 11043 DBRDMT MUSC&/FSCA 1ST 20/<: CPT | Performed by: INTERNAL MEDICINE

## 2018-05-02 PROCEDURE — 11046 DBRDMT MUSC&/FSCA EA ADDL: CPT | Performed by: INTERNAL MEDICINE

## 2018-05-02 PROCEDURE — G8598 ASA/ANTIPLAT THER USED: HCPCS | Performed by: NURSE PRACTITIONER

## 2018-05-02 RX ORDER — LIDOCAINE 40 MG/G
CREAM TOPICAL
Status: DISPENSED | OUTPATIENT
Start: 2018-05-02 | End: 2018-05-02

## 2018-05-02 RX ORDER — OXYCODONE HYDROCHLORIDE 5 MG/1
TABLET ORAL
Qty: 28 TABLET | Refills: 0 | Status: SHIPPED | OUTPATIENT
Start: 2018-05-02 | End: 2018-05-09

## 2018-05-02 ASSESSMENT — PAIN DESCRIPTION - PROGRESSION: CLINICAL_PROGRESSION: NOT CHANGED

## 2018-05-02 ASSESSMENT — PAIN DESCRIPTION - FREQUENCY: FREQUENCY: CONTINUOUS

## 2018-05-02 ASSESSMENT — PAIN SCALES - GENERAL: PAINLEVEL_OUTOF10: 9

## 2018-05-02 ASSESSMENT — PAIN DESCRIPTION - DESCRIPTORS: DESCRIPTORS: BURNING;STABBING

## 2018-05-02 ASSESSMENT — PAIN DESCRIPTION - PAIN TYPE: TYPE: CHRONIC PAIN

## 2018-05-02 ASSESSMENT — PAIN DESCRIPTION - ORIENTATION: ORIENTATION: LEFT;RIGHT

## 2018-05-02 ASSESSMENT — PAIN DESCRIPTION - ONSET: ONSET: ON-GOING

## 2018-05-02 ASSESSMENT — PAIN DESCRIPTION - LOCATION: LOCATION: SHOULDER

## 2018-05-03 ENCOUNTER — TELEPHONE (OUTPATIENT)
Dept: INTERNAL MEDICINE CLINIC | Age: 53
End: 2018-05-03

## 2018-05-03 DIAGNOSIS — E87.5 HIGH POTASSIUM: Primary | ICD-10-CM

## 2018-05-03 LAB
A/G RATIO: 0.9 (ref 1.1–2.2)
ALBUMIN SERPL-MCNC: 3.8 G/DL (ref 3.4–5)
ALP BLD-CCNC: 111 U/L (ref 40–129)
ALT SERPL-CCNC: 7 U/L (ref 10–40)
ANION GAP SERPL CALCULATED.3IONS-SCNC: 18 MMOL/L (ref 3–16)
AST SERPL-CCNC: 10 U/L (ref 15–37)
BASOPHILS ABSOLUTE: 0.1 K/UL (ref 0–0.2)
BASOPHILS RELATIVE PERCENT: 0.9 %
BILIRUB SERPL-MCNC: <0.2 MG/DL (ref 0–1)
BUN BLDV-MCNC: 50 MG/DL (ref 7–20)
CALCIUM SERPL-MCNC: 9.1 MG/DL (ref 8.3–10.6)
CHLORIDE BLD-SCNC: 95 MMOL/L (ref 99–110)
CO2: 23 MMOL/L (ref 21–32)
CREAT SERPL-MCNC: 1.3 MG/DL (ref 0.9–1.3)
EOSINOPHILS ABSOLUTE: 0.2 K/UL (ref 0–0.6)
EOSINOPHILS RELATIVE PERCENT: 2.2 %
GFR AFRICAN AMERICAN: >60
GFR NON-AFRICAN AMERICAN: 58
GLOBULIN: 4.1 G/DL
GLUCOSE BLD-MCNC: 62 MG/DL (ref 70–99)
HCT VFR BLD CALC: 27.6 % (ref 40.5–52.5)
HEMOGLOBIN: 8.3 G/DL (ref 13.5–17.5)
LYMPHOCYTES ABSOLUTE: 1.3 K/UL (ref 1–5.1)
LYMPHOCYTES RELATIVE PERCENT: 14.4 %
MCH RBC QN AUTO: 26 PG (ref 26–34)
MCHC RBC AUTO-ENTMCNC: 30 G/DL (ref 31–36)
MCV RBC AUTO: 86.8 FL (ref 80–100)
MONOCYTES ABSOLUTE: 0.6 K/UL (ref 0–1.3)
MONOCYTES RELATIVE PERCENT: 7.1 %
NEUTROPHILS ABSOLUTE: 6.6 K/UL (ref 1.7–7.7)
NEUTROPHILS RELATIVE PERCENT: 75.4 %
PDW BLD-RTO: 23.6 % (ref 12.4–15.4)
PLATELET # BLD: 383 K/UL (ref 135–450)
PMV BLD AUTO: 8.7 FL (ref 5–10.5)
POTASSIUM SERPL-SCNC: 5.6 MMOL/L (ref 3.5–5.1)
RBC # BLD: 3.18 M/UL (ref 4.2–5.9)
SODIUM BLD-SCNC: 136 MMOL/L (ref 136–145)
TOTAL PROTEIN: 7.9 G/DL (ref 6.4–8.2)
WBC # BLD: 8.7 K/UL (ref 4–11)

## 2018-05-07 ENCOUNTER — TELEPHONE (OUTPATIENT)
Dept: INTERNAL MEDICINE CLINIC | Age: 53
End: 2018-05-07

## 2018-05-09 PROBLEM — M86.9 OSTEOMYELITIS DUE TO TYPE 2 DIABETES MELLITUS (HCC): Status: ACTIVE | Noted: 2018-05-09

## 2018-05-09 PROBLEM — E11.69 OSTEOMYELITIS DUE TO TYPE 2 DIABETES MELLITUS (HCC): Status: ACTIVE | Noted: 2018-05-09

## 2018-05-10 ENCOUNTER — HOSPITAL ENCOUNTER (OUTPATIENT)
Dept: PREADMISSION TESTING | Age: 53
Discharge: HOME | End: 2018-05-02
Attending: PODIATRIST | Admitting: PODIATRIST

## 2018-05-10 VITALS
HEIGHT: 75 IN | HEART RATE: 78 BPM | DIASTOLIC BLOOD PRESSURE: 36 MMHG | TEMPERATURE: 98.2 F | RESPIRATION RATE: 17 BRPM | OXYGEN SATURATION: 98 % | SYSTOLIC BLOOD PRESSURE: 85 MMHG

## 2018-05-10 LAB
GLUCOSE BLD-MCNC: 105 MG/DL (ref 70–99)
GLUCOSE BLD-MCNC: 149 MG/DL (ref 70–99)
GLUCOSE BLD-MCNC: 71 MG/DL (ref 70–99)
GLUCOSE BLD-MCNC: 85 MG/DL (ref 70–99)
GLUCOSE BLD-MCNC: 93 MG/DL (ref 70–99)
PERFORMED ON: ABNORMAL
PERFORMED ON: ABNORMAL
PERFORMED ON: NORMAL

## 2018-05-10 ASSESSMENT — PAIN SCALES - GENERAL
PAINLEVEL_OUTOF10: 0
PAINLEVEL_OUTOF10: 0

## 2018-05-11 LAB
GLUCOSE BLD-MCNC: 188 MG/DL (ref 70–99)
PERFORMED ON: ABNORMAL

## 2018-05-16 ENCOUNTER — HOSPITAL ENCOUNTER (OUTPATIENT)
Dept: WOUND CARE | Age: 53
Discharge: HOME | End: 2018-05-16
Attending: INTERNAL MEDICINE | Admitting: INTERNAL MEDICINE

## 2018-05-16 VITALS
DIASTOLIC BLOOD PRESSURE: 73 MMHG | TEMPERATURE: 97.2 F | HEART RATE: 80 BPM | HEIGHT: 75 IN | SYSTOLIC BLOOD PRESSURE: 109 MMHG | RESPIRATION RATE: 17 BRPM

## 2018-05-16 DIAGNOSIS — L89.624 PRESSURE ULCER OF LEFT HEEL, STAGE 4 (HCC): ICD-10-CM

## 2018-05-16 PROCEDURE — 11045 DBRDMT SUBQ TISS EACH ADDL: CPT | Performed by: INTERNAL MEDICINE

## 2018-05-16 PROCEDURE — 17250 CHEM CAUT OF GRANLTJ TISSUE: CPT | Performed by: INTERNAL MEDICINE

## 2018-05-16 PROCEDURE — 11042 DBRDMT SUBQ TIS 1ST 20SQCM/<: CPT | Performed by: INTERNAL MEDICINE

## 2018-05-16 RX ORDER — LIDOCAINE 40 MG/G
CREAM TOPICAL PRN
Status: DISCONTINUED | OUTPATIENT
Start: 2018-05-16 | End: 2018-05-17 | Stop reason: HOSPADM

## 2018-05-16 ASSESSMENT — PAIN SCALES - GENERAL: PAINLEVEL_OUTOF10: 0

## 2018-05-19 DIAGNOSIS — Z79.4 TYPE 2 DIABETES MELLITUS WITH OTHER SKIN ULCER, WITH LONG-TERM CURRENT USE OF INSULIN (HCC): ICD-10-CM

## 2018-05-19 DIAGNOSIS — E11.622 TYPE 2 DIABETES MELLITUS WITH OTHER SKIN ULCER, WITH LONG-TERM CURRENT USE OF INSULIN (HCC): ICD-10-CM

## 2018-05-21 RX ORDER — INSULIN GLARGINE 100 [IU]/ML
INJECTION, SOLUTION SUBCUTANEOUS
Qty: 3 VIAL | Refills: 0 | Status: ON HOLD | OUTPATIENT
Start: 2018-05-21 | End: 2018-09-27

## 2018-05-23 ENCOUNTER — HOSPITAL ENCOUNTER (OUTPATIENT)
Dept: WOUND CARE | Age: 53
Discharge: HOME | End: 2018-05-23
Attending: INTERNAL MEDICINE | Admitting: INTERNAL MEDICINE

## 2018-05-23 VITALS
HEIGHT: 75 IN | WEIGHT: 273 LBS | TEMPERATURE: 97.4 F | DIASTOLIC BLOOD PRESSURE: 64 MMHG | RESPIRATION RATE: 14 BRPM | BODY MASS INDEX: 33.94 KG/M2 | HEART RATE: 83 BPM | SYSTOLIC BLOOD PRESSURE: 90 MMHG

## 2018-05-23 PROCEDURE — 97598 DBRDMT OPN WND ADDL 20CM/<: CPT | Performed by: INTERNAL MEDICINE

## 2018-05-23 PROCEDURE — 17250 CHEM CAUT OF GRANLTJ TISSUE: CPT | Performed by: INTERNAL MEDICINE

## 2018-05-23 PROCEDURE — 97597 DBRDMT OPN WND 1ST 20 CM/<: CPT | Performed by: INTERNAL MEDICINE

## 2018-05-23 PROCEDURE — 97606 NEG PRS WND THER DME>50 SQCM: CPT | Performed by: INTERNAL MEDICINE

## 2018-05-23 PROCEDURE — 11043 DBRDMT MUSC&/FSCA 1ST 20/<: CPT | Performed by: INTERNAL MEDICINE

## 2018-05-23 ASSESSMENT — PAIN SCALES - GENERAL: PAINLEVEL_OUTOF10: 0

## 2018-05-29 PROBLEM — L97.926 NON-PRESSURE CHRONIC ULCER OF LEFT LOWER LEG WITH BONE INVOLVEMENT WITHOUT EVIDENCE OF NECROSIS (HCC): Status: RESOLVED | Noted: 2017-06-10 | Resolved: 2018-05-29

## 2018-05-29 PROBLEM — E11.69 OSTEOMYELITIS DUE TO TYPE 2 DIABETES MELLITUS (HCC): Status: RESOLVED | Noted: 2018-05-09 | Resolved: 2018-05-29

## 2018-05-29 PROBLEM — L89.624 PRESSURE ULCER OF LEFT HEEL, STAGE 4 (HCC): Status: ACTIVE | Noted: 2018-05-29

## 2018-05-29 PROBLEM — M86.9 OSTEOMYELITIS DUE TO TYPE 2 DIABETES MELLITUS (HCC): Status: RESOLVED | Noted: 2018-05-09 | Resolved: 2018-05-29

## 2018-05-30 ENCOUNTER — HOSPITAL ENCOUNTER (OUTPATIENT)
Dept: WOUND CARE | Age: 53
Discharge: HOME | End: 2018-05-30
Attending: INTERNAL MEDICINE | Admitting: INTERNAL MEDICINE

## 2018-05-30 VITALS
DIASTOLIC BLOOD PRESSURE: 62 MMHG | HEART RATE: 76 BPM | BODY MASS INDEX: 32.7 KG/M2 | RESPIRATION RATE: 16 BRPM | WEIGHT: 263 LBS | SYSTOLIC BLOOD PRESSURE: 94 MMHG | TEMPERATURE: 97.6 F | HEIGHT: 75 IN

## 2018-05-30 PROCEDURE — 17250 CHEM CAUT OF GRANLTJ TISSUE: CPT | Performed by: INTERNAL MEDICINE

## 2018-05-30 PROCEDURE — 11045 DBRDMT SUBQ TISS EACH ADDL: CPT | Performed by: INTERNAL MEDICINE

## 2018-05-30 PROCEDURE — 97605 NEG PRS WND THER DME<=50SQCM: CPT | Performed by: INTERNAL MEDICINE

## 2018-05-30 PROCEDURE — 11042 DBRDMT SUBQ TIS 1ST 20SQCM/<: CPT | Performed by: INTERNAL MEDICINE

## 2018-05-30 RX ORDER — LIDOCAINE 40 MG/G
CREAM TOPICAL ONCE
Status: DISCONTINUED | OUTPATIENT
Start: 2018-05-30 | End: 2018-05-31 | Stop reason: HOSPADM

## 2018-05-30 ASSESSMENT — PAIN DESCRIPTION - PAIN TYPE: TYPE: CHRONIC PAIN

## 2018-05-30 ASSESSMENT — PAIN DESCRIPTION - ORIENTATION: ORIENTATION: LEFT

## 2018-05-30 ASSESSMENT — PAIN SCALES - GENERAL: PAINLEVEL_OUTOF10: 7

## 2018-05-30 ASSESSMENT — PAIN DESCRIPTION - LOCATION: LOCATION: SHOULDER

## 2018-05-30 ASSESSMENT — PAIN DESCRIPTION - ONSET: ONSET: ON-GOING

## 2018-05-30 ASSESSMENT — PAIN DESCRIPTION - DESCRIPTORS: DESCRIPTORS: ACHING

## 2018-05-30 ASSESSMENT — PAIN DESCRIPTION - FREQUENCY: FREQUENCY: INTERMITTENT

## 2018-06-06 ENCOUNTER — HOSPITAL ENCOUNTER (OUTPATIENT)
Dept: WOUND CARE | Age: 53
Discharge: HOME | End: 2018-06-06
Attending: INTERNAL MEDICINE | Admitting: INTERNAL MEDICINE

## 2018-06-06 VITALS
RESPIRATION RATE: 18 BRPM | TEMPERATURE: 98 F | DIASTOLIC BLOOD PRESSURE: 56 MMHG | SYSTOLIC BLOOD PRESSURE: 90 MMHG | HEART RATE: 77 BPM

## 2018-06-06 PROCEDURE — 11045 DBRDMT SUBQ TISS EACH ADDL: CPT | Performed by: INTERNAL MEDICINE

## 2018-06-06 PROCEDURE — 17250 CHEM CAUT OF GRANLTJ TISSUE: CPT | Performed by: INTERNAL MEDICINE

## 2018-06-06 PROCEDURE — 97605 NEG PRS WND THER DME<=50SQCM: CPT | Performed by: INTERNAL MEDICINE

## 2018-06-06 PROCEDURE — 11042 DBRDMT SUBQ TIS 1ST 20SQCM/<: CPT | Performed by: INTERNAL MEDICINE

## 2018-06-06 ASSESSMENT — PAIN DESCRIPTION - PAIN TYPE: TYPE: CHRONIC PAIN

## 2018-06-06 ASSESSMENT — PAIN DESCRIPTION - ONSET: ONSET: ON-GOING

## 2018-06-06 ASSESSMENT — PAIN DESCRIPTION - DESCRIPTORS: DESCRIPTORS: ACHING

## 2018-06-06 ASSESSMENT — PAIN DESCRIPTION - PROGRESSION: CLINICAL_PROGRESSION: NOT CHANGED

## 2018-06-06 ASSESSMENT — PAIN DESCRIPTION - LOCATION: LOCATION: SHOULDER

## 2018-06-06 ASSESSMENT — PAIN SCALES - GENERAL: PAINLEVEL_OUTOF10: 7

## 2018-06-06 ASSESSMENT — PAIN DESCRIPTION - ORIENTATION: ORIENTATION: LEFT

## 2018-06-06 ASSESSMENT — PAIN DESCRIPTION - FREQUENCY: FREQUENCY: CONTINUOUS

## 2018-06-13 ENCOUNTER — HOSPITAL ENCOUNTER (OUTPATIENT)
Dept: WOUND CARE | Age: 53
Discharge: HOME | End: 2018-06-13
Attending: INTERNAL MEDICINE | Admitting: INTERNAL MEDICINE

## 2018-06-13 VITALS
RESPIRATION RATE: 17 BRPM | DIASTOLIC BLOOD PRESSURE: 69 MMHG | TEMPERATURE: 98.3 F | HEIGHT: 75 IN | SYSTOLIC BLOOD PRESSURE: 114 MMHG | HEART RATE: 86 BPM

## 2018-06-13 PROCEDURE — 97597 DBRDMT OPN WND 1ST 20 CM/<: CPT | Performed by: INTERNAL MEDICINE

## 2018-06-13 PROCEDURE — 17250 CHEM CAUT OF GRANLTJ TISSUE: CPT | Performed by: INTERNAL MEDICINE

## 2018-06-13 PROCEDURE — 97598 DBRDMT OPN WND ADDL 20CM/<: CPT | Performed by: INTERNAL MEDICINE

## 2018-06-13 RX ORDER — SULFAMETHOXAZOLE AND TRIMETHOPRIM 800; 160 MG/1; MG/1
1 TABLET ORAL
COMMUNITY
End: 2018-07-05

## 2018-06-13 ASSESSMENT — PAIN SCALES - GENERAL: PAINLEVEL_OUTOF10: 0

## 2018-06-20 ENCOUNTER — HOSPITAL ENCOUNTER (OUTPATIENT)
Dept: WOUND CARE | Age: 53
Discharge: HOME | End: 2018-06-20
Attending: INTERNAL MEDICINE | Admitting: INTERNAL MEDICINE

## 2018-06-20 VITALS
HEART RATE: 81 BPM | RESPIRATION RATE: 16 BRPM | HEIGHT: 75 IN | TEMPERATURE: 97.6 F | SYSTOLIC BLOOD PRESSURE: 99 MMHG | DIASTOLIC BLOOD PRESSURE: 64 MMHG | WEIGHT: 260.2 LBS | BODY MASS INDEX: 32.35 KG/M2

## 2018-06-20 PROCEDURE — 97598 DBRDMT OPN WND ADDL 20CM/<: CPT | Performed by: INTERNAL MEDICINE

## 2018-06-20 PROCEDURE — 97597 DBRDMT OPN WND 1ST 20 CM/<: CPT | Performed by: INTERNAL MEDICINE

## 2018-06-20 PROCEDURE — 17250 CHEM CAUT OF GRANLTJ TISSUE: CPT | Performed by: INTERNAL MEDICINE

## 2018-06-20 ASSESSMENT — PAIN SCALES - GENERAL: PAINLEVEL_OUTOF10: 0

## 2018-06-27 ENCOUNTER — HOSPITAL ENCOUNTER (OUTPATIENT)
Dept: WOUND CARE | Age: 53
Discharge: HOME | End: 2018-06-27
Attending: INTERNAL MEDICINE | Admitting: INTERNAL MEDICINE

## 2018-06-27 VITALS
TEMPERATURE: 97.5 F | RESPIRATION RATE: 16 BRPM | DIASTOLIC BLOOD PRESSURE: 73 MMHG | HEART RATE: 87 BPM | SYSTOLIC BLOOD PRESSURE: 112 MMHG | HEIGHT: 75 IN

## 2018-06-27 PROCEDURE — 97598 DBRDMT OPN WND ADDL 20CM/<: CPT | Performed by: INTERNAL MEDICINE

## 2018-06-27 PROCEDURE — 17250 CHEM CAUT OF GRANLTJ TISSUE: CPT | Performed by: INTERNAL MEDICINE

## 2018-06-27 PROCEDURE — 11043 DBRDMT MUSC&/FSCA 1ST 20/<: CPT | Performed by: INTERNAL MEDICINE

## 2018-06-27 PROCEDURE — 11046 DBRDMT MUSC&/FSCA EA ADDL: CPT | Performed by: INTERNAL MEDICINE

## 2018-06-27 PROCEDURE — 97597 DBRDMT OPN WND 1ST 20 CM/<: CPT | Performed by: INTERNAL MEDICINE

## 2018-06-27 PROCEDURE — 97606 NEG PRS WND THER DME>50 SQCM: CPT | Performed by: INTERNAL MEDICINE

## 2018-06-27 RX ORDER — BLOOD SUGAR DIAGNOSTIC
STRIP MISCELLANEOUS
Qty: 100 EACH | Refills: 3 | Status: SHIPPED | OUTPATIENT
Start: 2018-06-27 | End: 2018-08-25

## 2018-06-27 RX ORDER — LIDOCAINE 40 MG/G
CREAM TOPICAL PRN
Status: DISCONTINUED | OUTPATIENT
Start: 2018-06-27 | End: 2018-06-28 | Stop reason: HOSPADM

## 2018-06-27 ASSESSMENT — PAIN SCALES - GENERAL: PAINLEVEL_OUTOF10: 0

## 2018-06-29 DIAGNOSIS — Z79.4 TYPE 2 DIABETES MELLITUS WITH OTHER SKIN ULCER, WITH LONG-TERM CURRENT USE OF INSULIN (HCC): ICD-10-CM

## 2018-06-29 DIAGNOSIS — E11.622 TYPE 2 DIABETES MELLITUS WITH OTHER SKIN ULCER, WITH LONG-TERM CURRENT USE OF INSULIN (HCC): ICD-10-CM

## 2018-06-29 RX ORDER — INSULIN GLARGINE 100 [IU]/ML
INJECTION, SOLUTION SUBCUTANEOUS
Qty: 3 VIAL | Refills: 0 | Status: ON HOLD | OUTPATIENT
Start: 2018-06-29 | End: 2018-07-21

## 2018-07-05 ENCOUNTER — HOSPITAL ENCOUNTER (OUTPATIENT)
Dept: WOUND CARE | Age: 53
Discharge: HOME | End: 2018-07-05
Attending: INTERNAL MEDICINE | Admitting: INTERNAL MEDICINE

## 2018-07-05 VITALS
BODY MASS INDEX: 32.75 KG/M2 | TEMPERATURE: 98.5 F | WEIGHT: 262 LBS | SYSTOLIC BLOOD PRESSURE: 112 MMHG | DIASTOLIC BLOOD PRESSURE: 66 MMHG | RESPIRATION RATE: 86 BRPM | HEART RATE: 86 BPM

## 2018-07-05 DIAGNOSIS — L29.9 PRURITUS OF SKIN: ICD-10-CM

## 2018-07-05 DIAGNOSIS — L03.317 CELLULITIS OF RIGHT BUTTOCK: ICD-10-CM

## 2018-07-05 PROCEDURE — 17250 CHEM CAUT OF GRANLTJ TISSUE: CPT | Performed by: INTERNAL MEDICINE

## 2018-07-05 PROCEDURE — 11045 DBRDMT SUBQ TISS EACH ADDL: CPT | Performed by: INTERNAL MEDICINE

## 2018-07-05 PROCEDURE — 97605 NEG PRS WND THER DME<=50SQCM: CPT | Performed by: INTERNAL MEDICINE

## 2018-07-05 PROCEDURE — 11042 DBRDMT SUBQ TIS 1ST 20SQCM/<: CPT | Performed by: INTERNAL MEDICINE

## 2018-07-05 PROCEDURE — 99214 OFFICE O/P EST MOD 30 MIN: CPT | Performed by: INTERNAL MEDICINE

## 2018-07-05 PROCEDURE — 97597 DBRDMT OPN WND 1ST 20 CM/<: CPT | Performed by: INTERNAL MEDICINE

## 2018-07-05 PROCEDURE — 97598 DBRDMT OPN WND ADDL 20CM/<: CPT | Performed by: INTERNAL MEDICINE

## 2018-07-05 RX ORDER — LIDOCAINE HYDROCHLORIDE 40 MG/ML
SOLUTION TOPICAL ONCE
Status: DISCONTINUED | OUTPATIENT
Start: 2018-07-05 | End: 2018-07-06 | Stop reason: HOSPADM

## 2018-07-05 RX ORDER — CIPROFLOXACIN 750 MG/1
750 TABLET, FILM COATED ORAL EVERY 12 HOURS
Qty: 14 TABLET | Refills: 0 | Status: SHIPPED | OUTPATIENT
Start: 2018-07-05 | End: 2018-07-12 | Stop reason: SINTOL

## 2018-07-05 ASSESSMENT — PAIN DESCRIPTION - DESCRIPTORS: DESCRIPTORS: STABBING;BURNING

## 2018-07-05 ASSESSMENT — PAIN DESCRIPTION - ONSET: ONSET: ON-GOING

## 2018-07-05 ASSESSMENT — PAIN DESCRIPTION - PAIN TYPE: TYPE: CHRONIC PAIN

## 2018-07-05 ASSESSMENT — PAIN DESCRIPTION - FREQUENCY: FREQUENCY: CONTINUOUS

## 2018-07-05 ASSESSMENT — PAIN DESCRIPTION - LOCATION: LOCATION: SHOULDER

## 2018-07-05 ASSESSMENT — PAIN DESCRIPTION - ORIENTATION: ORIENTATION: LEFT

## 2018-07-05 ASSESSMENT — PAIN SCALES - GENERAL: PAINLEVEL_OUTOF10: 4

## 2018-07-05 ASSESSMENT — PAIN DESCRIPTION - PROGRESSION: CLINICAL_PROGRESSION: NOT CHANGED

## 2018-07-08 LAB
GRAM STAIN RESULT: ABNORMAL
ORGANISM: ABNORMAL
WOUND/ABSCESS: ABNORMAL

## 2018-07-09 PROBLEM — L03.317 CELLULITIS OF RIGHT BUTTOCK: Status: ACTIVE | Noted: 2018-07-09

## 2018-07-09 RX ORDER — PROMETHAZINE HYDROCHLORIDE 25 MG/1
25 TABLET ORAL EVERY 6 HOURS PRN
Qty: 60 TABLET | Refills: 1 | Status: SHIPPED | OUTPATIENT
Start: 2018-07-09 | End: 2021-01-26 | Stop reason: SDUPTHER

## 2018-07-10 PROBLEM — L29.9 PRURITUS OF SKIN: Status: ACTIVE | Noted: 2018-07-10

## 2018-07-10 PROBLEM — L29.9 PRURITUS OF SKIN: Status: RESOLVED | Noted: 2018-07-10 | Resolved: 2018-07-10

## 2018-07-10 NOTE — PROGRESS NOTES
nitroGLYCERIN, nystatin, oxyCODONE, pantoprazole, promethazine, rosuvastatin, senna, torsemide, and traZODone. Stopped Bactrim this past week. Allergies: Benadryl [diphenhydramine hcl]; Bactrim [sulfamethoxazole-trimethoprim]; Statins [statins]; Cephalexin; Morphine; Penicillins; and Sulfa antibiotics    Pertinent items from the review of systems are discussed in the HPI; the remainder of the ROS was reviewed and is negative. Objective:     Vitals:    07/05/18 1401   BP: 112/66   Pulse: 86   Resp: (!) 86   Temp: 98.5 °F (36.9 °C)   TempSrc: Oral   Weight: 262 lb (118.8 kg)       Constitutional:  well-developed, well-nourished, overweight, fatigued, NAD  Psychiatric:  oriented to person, place and time; mood and affect appropriate for the situation   Eyes:  pupils equal, round and reactive to light; sclerae anicteric, conjunctivae not pale  ENT: no thrush or oral ulcers, mucous membranes moist  Abd: soft, NT, ND, good BS  Cardiovascular:  Right pedal pulses palpable; moderate right lower extremity stage 2 lymphedema  Lymphatic:  no inguinal or popliteal adenopathy, no LE cellulitis or lymphangitis  Musculoskeletal:  no clubbing, cyanosis or petechiae; RLE and LLE with no gross effusions or acute arthritis; stable deformity of right foot after loss of 5th met base / peroneal tendon attachment  [place]-ulcer skin:  largely pink and indurated, but still some ecchymotic and even thin black changes at right lateral foot and heel; ischial periwound red, moist, macerated, inflamed.   Ulcer(s):  T-spine mostly red and granular, slowly smaller and more superficial, the 3:00 tunnel unroofing from last week has helped I think, but still with some SQ fibronecrosis and slough there, a few mm of undermining past the screw head, and still a few persistent tunnels elsewhere; right hip sinus tract stable; ischial ulcer with the central portion relatively red and granular, but sadly much increased drainage, Pseudomonal malodor and color, significant fibrin and biofilm, friability, additional skin loss; right heel and lateral foot partly red and hypergranular, part fibrotic, a bit of fibrin and biofilm at lateral foot, and the tunnel along the peroneal tendon was nearly closed, though still probed deeply with effort, but no pus or blood clots as in recent weeks. Photos also saved in electronic chart.     Today's Wound Measurements:  Wound 10/25/17 #29, right lateral foot, DFU, Mccauley 2 onset 10/25/17, pressure-Wound Length (cm): 8.5 cm  Wound 11/15/17 #31- R heel, pressure, Stage 4, onset 11/15/17, pressure-Wound Length (cm): 2.3 cm  Wound 10/17/14  #8, right ischium, stage 4 PU, onset 7/15/14 (merged with #30), pressure-Wound Length (cm): 9.1 cm  Wound 08/16/17 #27- Thoracic spine, dehisced surgical wound, full thickness, onset 8/16/2017, pressure-Wound Length (cm): 7.9 cm    Wound 10/25/17 #29, right lateral foot, DFU, Mccauley 2 onset 10/25/17, pressure-Wound Width (cm): 4 cm  Wound 11/15/17 #31- R heel, pressure, Stage 4, onset 11/15/17, pressure-Wound Width (cm): 5.3 cm  Wound 10/17/14  #8, right ischium, stage 4 PU, onset 7/15/14 (merged with #30), pressure-Wound Width (cm): 2.8 cm  Wound 08/16/17 #27- Thoracic spine, dehisced surgical wound, full thickness, onset 8/16/2017, pressure-Wound Width (cm): 4.5 cm    Wound 10/25/17 #29, right lateral foot, DFU, Mccauley 2 onset 10/25/17, pressure-Wound Depth (cm) : 0.2  Wound 11/15/17 #31- R heel, pressure, Stage 4, onset 11/15/17, pressure-Wound Depth (cm) : 0.2  Wound 10/17/14  #8, right ischium, stage 4 PU, onset 7/15/14 (merged with #30), pressure-Wound Depth (cm) : 3.1  Wound 08/16/17 #27- Thoracic spine, dehisced surgical wound, full thickness, onset 8/16/2017, pressure-Wound Depth (cm) : 1.5  _____________________________    Lab Results   Component Value Date    LABALBU 3.1 (L) 05/09/2018     Lab Results   Component Value Date    CREATININE 1.0 05/15/2018     Lab Results   Component Value and/or persistence of the chronic ulcer include lymphedema, diabetes, chronic pressure, decreased mobility, shear force and obesity. Medical necessity of today's visit is shown by the above documentation. Sharp debridement is indicated today, based upon the exam findings in the ulcer(s) above. Procedure note:     Consent obtained. Time out performed per [facility] policy. Anesthetic  Anesthetic: 4% Lidocaine Liquid Topical     Using a curette, #15 blade scalpel and forceps, I sharply debrided the back ulcer(s) down through and including the removal of subcutaneous tissue. The type(s) of tissue debrided included fibrin, biofilm, slough and necrotic/eschar. Total Surface Area Debrided: 30 sq cm. Using a curette, I sharply debrided the buttocks and right foot ulcer(s) down through and including the removal of dermis. The type(s) of tissue debrided included fibrin, biofilm, necrotic/eschar and exudate. Total Surface Area Debrided: 50 sq cm.     Post  Debridement Measurements:  Wound 10/25/17 #29, right lateral foot, DFU, Mccauley 2 onset 10/25/17, pressure-Wound Length (cm): 8.5 cm  Wound 11/15/17 #31- R heel, pressure, Stage 4, onset 11/15/17, pressure-Wound Length (cm): 2.3 cm  Wound 10/17/14  #8, right ischium, stage 4 PU, onset 7/15/14 (merged with #30), pressure-Wound Length (cm): 9.1 cm  Wound 08/16/17 #27- Thoracic spine, dehisced surgical wound, full thickness, onset 8/16/2017, pressure-Wound Length (cm): 7.9 cm    Wound 10/25/17 #29, right lateral foot, DFU, Mccauley 2 onset 10/25/17, pressure-Wound Width (cm): 4 cm  Wound 11/15/17 #31- R heel, pressure, Stage 4, onset 11/15/17, pressure-Wound Width (cm): 5.3 cm  Wound 10/17/14  #8, right ischium, stage 4 PU, onset 7/15/14 (merged with #30), pressure-Wound Width (cm): 2.8 cm  Wound 08/16/17 #27- Thoracic spine, dehisced surgical wound, full thickness, onset 8/16/2017, pressure-Wound Width (cm): 4.5 cm    Wound 10/25/17 #29, right lateral foot, DFU, Mccauley 2 onset 10/25/17, pressure-Wound Depth (cm) : 0.2  Wound 11/15/17 #31- R heel, pressure, Stage 4, onset 11/15/17, pressure-Wound Depth (cm) : 0.2  Wound 10/17/14  #8, right ischium, stage 4 PU, onset 7/15/14 (merged with #30), pressure-Wound Depth (cm) : 3.1  Wound 08/16/17 #27- Thoracic spine, dehisced surgical wound, full thickness, onset 8/16/2017, pressure-Wound Depth (cm) : 1.5    The ulcers were then irrigated with normal saline solution. The procedure was completed with a moderate amount of bleeding, and hemostasis was by pressure. The patient tolerated the procedure well, with no significant complications. The patient's level of pain during and after the procedure was monitored, and is noted in the wound documentation flowsheet. I then used a sterile swab to collect fresh wound exudate from the ischial ulcer, to send for Gram stain and Cx.  ________________    To encourage better epithelial cell coverage, I did use AgNO3 to chemically cauterize hypergranulation tissue on the right heel ulcer(s), after application of 4% lidocaine topical solution. This was tolerated well, with no pain or skin injury. Discharge plan:     Treatment in the wound care center today: Wound 10/25/17 #29, right lateral foot, DFU, Mccauley 2 onset 10/25/17, pressure-Dressing/Treatment: Other (Comment) (triad alin,1/4\" iodoform,opticell ag,football dressing(3 joanna)  Wound 11/15/17 #31- R heel, pressure, Stage 4, onset 11/15/17, pressure-Dressing/Treatment: Other (Comment) (triad,opticell ag,foam to dorsal foot,football dressing(3cas)  Wound 10/17/14  #8, right ischium, stage 4 PU, onset 7/15/14 (merged with #30), pressure-Dressing/Treatment: Other (Comment) (nexodyn,triad,opticell ag,gauze bolster,4x4,mepilex border)  Wound 08/16/17 #27- Thoracic spine, dehisced surgical wound, full thickness, onset 8/16/2017, pressure-Dressing/Treatment: Vacuum dressing (nexodyn,flagyl,triad,iodoform,sorbact,black foam).     Await Cx results. Empiric Cipro 750 mg q12 for now. Space at least a couple of hours apart from Mg and Fe pills. Agree with stopping Bactrim; may not be able to really attempt to chronically suppress organisms that could be around his T-spine hardware any longer. Keep up good work with glucose control and protein intake; strong focus on offloading. Timing of RLE surgery (and left heel STSG, per the patient) per Dr. Ambrose Grove. I think he's appropriate to be scheduled as soon as the buttock cellulitis has started to calm down. Home treatment: good handwashing before and after any dressing changes. Cleanse ulcer with saline or soap & water before dressing change. May use Vaseline (petrolatum), Aquaphor, Aveeno, CeraVe, Cetaphil, Eucerin, Lubriderm, etc for dry skin. Dressing type for home: Other: LLE per Dr. Ambrose Grove; leave primary and football dressings on the right foot for the week; iodoform to right hip TIW; Triad and silver alginate, gauze bolster to right ischium TIW; iodoform, Sorbact, foam, NPWT to the T-spine, 3 times weekly. Written discharge instructions given to patient. Follow up in 1 week.     Electronically signed by Carlos Alberto Armstrong MD on 7/10/2018 at 4:31 PM.

## 2018-07-11 ENCOUNTER — HOSPITAL ENCOUNTER (OUTPATIENT)
Dept: WOUND CARE | Age: 53
Discharge: HOME | End: 2018-07-11
Attending: INTERNAL MEDICINE | Admitting: INTERNAL MEDICINE

## 2018-07-11 ENCOUNTER — HOSPITAL ENCOUNTER (OUTPATIENT)
Dept: GENERAL RADIOLOGY | Age: 53
Discharge: HOME | End: 2018-07-11
Attending: INTERNAL MEDICINE | Admitting: INTERNAL MEDICINE

## 2018-07-11 VITALS
RESPIRATION RATE: 16 BRPM | DIASTOLIC BLOOD PRESSURE: 68 MMHG | HEIGHT: 75 IN | WEIGHT: 263.5 LBS | BODY MASS INDEX: 32.76 KG/M2 | SYSTOLIC BLOOD PRESSURE: 111 MMHG | TEMPERATURE: 97.6 F | HEART RATE: 86 BPM

## 2018-07-11 DIAGNOSIS — L03.317 CELLULITIS OF BUTTOCK: ICD-10-CM

## 2018-07-11 DIAGNOSIS — L03.317 CELLULITIS OF RIGHT BUTTOCK: ICD-10-CM

## 2018-07-11 LAB
ANION GAP SERPL CALCULATED.3IONS-SCNC: 16 MMOL/L (ref 3–16)
APTT: 36 SEC (ref 26–36)
BASOPHILS ABSOLUTE: 0.1 K/UL (ref 0–0.2)
BASOPHILS RELATIVE PERCENT: 1 %
BUN BLDV-MCNC: 31 MG/DL (ref 7–20)
CALCIUM SERPL-MCNC: 8.8 MG/DL (ref 8.3–10.6)
CHLORIDE BLD-SCNC: 94 MMOL/L (ref 99–110)
CO2: 24 MMOL/L (ref 21–32)
CREAT SERPL-MCNC: 0.9 MG/DL (ref 0.9–1.3)
EOSINOPHILS ABSOLUTE: 0.2 K/UL (ref 0–0.6)
EOSINOPHILS RELATIVE PERCENT: 2.4 %
GFR AFRICAN AMERICAN: >60
GFR NON-AFRICAN AMERICAN: >60
GLUCOSE BLD-MCNC: 106 MG/DL (ref 70–99)
HCT VFR BLD CALC: 28.6 % (ref 40.5–52.5)
HEMOGLOBIN: 8.6 G/DL (ref 13.5–17.5)
INR BLD: 2.08 (ref 0.86–1.14)
LYMPHOCYTES ABSOLUTE: 1 K/UL (ref 1–5.1)
LYMPHOCYTES RELATIVE PERCENT: 9.5 %
MCH RBC QN AUTO: 22 PG (ref 26–34)
MCHC RBC AUTO-ENTMCNC: 30.2 G/DL (ref 31–36)
MCV RBC AUTO: 72.8 FL (ref 80–100)
MONOCYTES ABSOLUTE: 0.9 K/UL (ref 0–1.3)
MONOCYTES RELATIVE PERCENT: 8.7 %
NEUTROPHILS ABSOLUTE: 8 K/UL (ref 1.7–7.7)
NEUTROPHILS RELATIVE PERCENT: 78.4 %
PDW BLD-RTO: 21.2 % (ref 12.4–15.4)
PLATELET # BLD: 465 K/UL (ref 135–450)
PMV BLD AUTO: 8.1 FL (ref 5–10.5)
POTASSIUM SERPL-SCNC: 3.2 MMOL/L (ref 3.5–5.1)
PROTHROMBIN TIME: 23.7 SEC (ref 9.8–13)
RBC # BLD: 3.93 M/UL (ref 4.2–5.9)
SODIUM BLD-SCNC: 134 MMOL/L (ref 136–145)
WBC # BLD: 10.2 K/UL (ref 4–11)

## 2018-07-11 PROCEDURE — 97605 NEG PRS WND THER DME<=50SQCM: CPT | Performed by: INTERNAL MEDICINE

## 2018-07-11 PROCEDURE — 11043 DBRDMT MUSC&/FSCA 1ST 20/<: CPT | Performed by: INTERNAL MEDICINE

## 2018-07-11 PROCEDURE — 11045 DBRDMT SUBQ TISS EACH ADDL: CPT | Performed by: INTERNAL MEDICINE

## 2018-07-11 PROCEDURE — 17250 CHEM CAUT OF GRANLTJ TISSUE: CPT | Performed by: INTERNAL MEDICINE

## 2018-07-11 PROCEDURE — 11042 DBRDMT SUBQ TIS 1ST 20SQCM/<: CPT | Performed by: INTERNAL MEDICINE

## 2018-07-11 ASSESSMENT — PAIN DESCRIPTION - PAIN TYPE: TYPE: CHRONIC PAIN

## 2018-07-11 ASSESSMENT — PAIN SCALES - GENERAL: PAINLEVEL_OUTOF10: 4

## 2018-07-11 ASSESSMENT — PAIN DESCRIPTION - DESCRIPTORS: DESCRIPTORS: STABBING

## 2018-07-11 ASSESSMENT — PAIN DESCRIPTION - ONSET: ONSET: ON-GOING

## 2018-07-11 ASSESSMENT — PAIN DESCRIPTION - ORIENTATION: ORIENTATION: LEFT

## 2018-07-11 ASSESSMENT — PAIN DESCRIPTION - PROGRESSION: CLINICAL_PROGRESSION: NOT CHANGED

## 2018-07-11 ASSESSMENT — PAIN DESCRIPTION - LOCATION: LOCATION: SHOULDER

## 2018-07-11 ASSESSMENT — PAIN DESCRIPTION - FREQUENCY: FREQUENCY: CONTINUOUS

## 2018-07-11 NOTE — PROGRESS NOTES
Pt had dual lumen PICC placed in right brachial x 1 stick. Dual lumen placed due to no single lumens available.

## 2018-07-11 NOTE — PLAN OF CARE
Problem: Wound:  Goal: Will show signs of wound healing; wound closure and no evidence of infection  Will show signs of wound healing; wound closure and no evidence of infection   Outcome: Ongoing  Right Ischial & Right foot wounds remains fibrotic & necrotic tissue, not showing much change this week. Pt. Unable to tolerate Cipro d/t severe nausea. Plan to place PICC today & start Zosyn per MD Orders. Remaining wounds stable. Will otherwise cont. with current wound care regime. Reinforced importance of spending minimal time sitting in power chair & spending more time in bed on ATILIO & frequent repositioning. Pt. States he was in his chair even less this last week. f/u in [facility] in 1 week as ordered. Pt. Cont. To follow Dr Layo Moss for care on left foot/leg wounds. Discharge instructions reviewed with patient, all questions answered, copy given to patient. Dressings were applied to all wounds per M.D. Instructions at this visit.

## 2018-07-15 NOTE — PROGRESS NOTES
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 Earlene Bravo is considering a STSG for the left heel. Factors contributing to occurrence and/or persistence of the chronic ulcer include lymphedema, diabetes, chronic pressure, decreased mobility, shear force and obesity. I do think offloading at the right foot is better than it had been, with football dressings. Medical necessity of today's visit is shown by the above documentation. Sharp debridement is indicated today, based upon the exam findings in the wound(s) above. Procedure note:     Consent obtained. Time out performed per [facility] policy. Anesthetic  Anesthetic: 2% Lidocaine Injectable with Epinephrine  Total cc: 3 (per Dr Veena Thomas) -- into the 5:00 tunnel of the back wound. Using a curette, forceps and # 10 blade scalpel, I sharply debrided the back ulcer(s) down through and including the removal of subcutaneous tissue, including unroofing the 5:00 tunnel. The type(s) of tissue debrided included fibrin, slough and necrotic/eschar. Total Surface Area Debrided: 30 sq cm. Using a curette, scissors and forceps, I sharply debrided the right foot ulcer(s) down through and including the removal of muscle/fascia. The type(s) of tissue debrided included fibrin and necrotic/eschar. Total Surface Area Debrided: 10 sq cm.     Post  Debridement Measurements from today:  Wound 10/25/17 #29, right lateral foot, DFU, Mccauley 2 onset at 1:59 PM.

## 2018-07-16 ENCOUNTER — HOSPITAL ENCOUNTER (OUTPATIENT)
Age: 53
Discharge: HOME | End: 2018-07-16
Payer: MEDICARE

## 2018-07-16 LAB
A/G RATIO: 0.6 (ref 1.1–2.2)
ALBUMIN SERPL-MCNC: 3 G/DL (ref 3.4–5)
ALP BLD-CCNC: 101 U/L (ref 40–129)
ALT SERPL-CCNC: 7 U/L (ref 10–40)
ANION GAP SERPL CALCULATED.3IONS-SCNC: 19 MMOL/L (ref 3–16)
AST SERPL-CCNC: 11 U/L (ref 15–37)
BASOPHILS ABSOLUTE: 0.1 K/UL (ref 0–0.2)
BASOPHILS RELATIVE PERCENT: 0.8 %
BILIRUB SERPL-MCNC: 0.4 MG/DL (ref 0–1)
BUN BLDV-MCNC: 34 MG/DL (ref 7–20)
CALCIUM SERPL-MCNC: 8.7 MG/DL (ref 8.3–10.6)
CHLORIDE BLD-SCNC: 94 MMOL/L (ref 99–110)
CO2: 24 MMOL/L (ref 21–32)
CREAT SERPL-MCNC: 1 MG/DL (ref 0.9–1.3)
EOSINOPHILS ABSOLUTE: 0.3 K/UL (ref 0–0.6)
EOSINOPHILS RELATIVE PERCENT: 2.7 %
GFR AFRICAN AMERICAN: >60
GFR NON-AFRICAN AMERICAN: >60
GLOBULIN: 4.7 G/DL
GLUCOSE BLD-MCNC: 140 MG/DL (ref 70–99)
HCT VFR BLD CALC: 24.4 % (ref 40.5–52.5)
HEMOGLOBIN: 7.4 G/DL (ref 13.5–17.5)
LYMPHOCYTES ABSOLUTE: 1.3 K/UL (ref 1–5.1)
LYMPHOCYTES RELATIVE PERCENT: 11.9 %
MCH RBC QN AUTO: 22.3 PG (ref 26–34)
MCHC RBC AUTO-ENTMCNC: 30.5 G/DL (ref 31–36)
MCV RBC AUTO: 73.3 FL (ref 80–100)
MONOCYTES ABSOLUTE: 0.9 K/UL (ref 0–1.3)
MONOCYTES RELATIVE PERCENT: 8.1 %
NEUTROPHILS ABSOLUTE: 8.3 K/UL (ref 1.7–7.7)
NEUTROPHILS RELATIVE PERCENT: 76.5 %
PDW BLD-RTO: 20.8 % (ref 12.4–15.4)
PLATELET # BLD: 436 K/UL (ref 135–450)
PMV BLD AUTO: 8.7 FL (ref 5–10.5)
POTASSIUM SERPL-SCNC: 3.7 MMOL/L (ref 3.5–5.1)
RBC # BLD: 3.33 M/UL (ref 4.2–5.9)
SODIUM BLD-SCNC: 137 MMOL/L (ref 136–145)
TOTAL PROTEIN: 7.7 G/DL (ref 6.4–8.2)
WBC # BLD: 10.8 K/UL (ref 4–11)

## 2018-07-16 PROCEDURE — 80053 COMPREHEN METABOLIC PANEL: CPT

## 2018-07-16 PROCEDURE — 36415 COLL VENOUS BLD VENIPUNCTURE: CPT

## 2018-07-16 PROCEDURE — 85025 COMPLETE CBC W/AUTO DIFF WBC: CPT

## 2018-07-18 ENCOUNTER — HOSPITAL ENCOUNTER (OUTPATIENT)
Dept: NURSING | Age: 53
Setting detail: INFUSION SERIES
Discharge: HOME | DRG: 291 | End: 2018-07-18
Payer: MEDICARE

## 2018-07-18 ENCOUNTER — HOSPITAL ENCOUNTER (OUTPATIENT)
Dept: WOUND CARE | Age: 53
Discharge: HOME | DRG: 291 | End: 2018-07-18
Payer: MEDICARE

## 2018-07-18 VITALS
HEIGHT: 75 IN | SYSTOLIC BLOOD PRESSURE: 111 MMHG | HEART RATE: 80 BPM | DIASTOLIC BLOOD PRESSURE: 68 MMHG | WEIGHT: 276 LBS | TEMPERATURE: 97 F | BODY MASS INDEX: 34.32 KG/M2 | RESPIRATION RATE: 17 BRPM

## 2018-07-18 LAB
ABO/RH: NORMAL
ANTIBODY SCREEN: NORMAL
GLUCOSE BLD-MCNC: 72 MG/DL (ref 70–99)
PERFORMED ON: NORMAL

## 2018-07-18 PROCEDURE — 86900 BLOOD TYPING SEROLOGIC ABO: CPT

## 2018-07-18 PROCEDURE — 86850 RBC ANTIBODY SCREEN: CPT

## 2018-07-18 PROCEDURE — 97605 NEG PRS WND THER DME<=50SQCM: CPT | Performed by: INTERNAL MEDICINE

## 2018-07-18 PROCEDURE — 97605 NEG PRS WND THER DME<=50SQCM: CPT

## 2018-07-18 PROCEDURE — 17250 CHEM CAUT OF GRANLTJ TISSUE: CPT | Performed by: INTERNAL MEDICINE

## 2018-07-18 PROCEDURE — 17250 CHEM CAUT OF GRANLTJ TISSUE: CPT

## 2018-07-18 PROCEDURE — 11045 DBRDMT SUBQ TISS EACH ADDL: CPT

## 2018-07-18 PROCEDURE — 11045 DBRDMT SUBQ TISS EACH ADDL: CPT | Performed by: INTERNAL MEDICINE

## 2018-07-18 PROCEDURE — 11042 DBRDMT SUBQ TIS 1ST 20SQCM/<: CPT

## 2018-07-18 PROCEDURE — 86923 COMPATIBILITY TEST ELECTRIC: CPT

## 2018-07-18 PROCEDURE — 11042 DBRDMT SUBQ TIS 1ST 20SQCM/<: CPT | Performed by: INTERNAL MEDICINE

## 2018-07-18 PROCEDURE — 86901 BLOOD TYPING SEROLOGIC RH(D): CPT

## 2018-07-18 PROCEDURE — 99215 OFFICE O/P EST HI 40 MIN: CPT | Performed by: INTERNAL MEDICINE

## 2018-07-18 RX ORDER — FUROSEMIDE 10 MG/ML
40 INJECTION INTRAMUSCULAR; INTRAVENOUS ONCE
Status: CANCELLED | OUTPATIENT
Start: 2018-07-18 | End: 2018-07-18

## 2018-07-18 RX ORDER — LIDOCAINE 40 MG/G
CREAM TOPICAL PRN
Status: DISCONTINUED | OUTPATIENT
Start: 2018-07-18 | End: 2018-07-19 | Stop reason: HOSPADM

## 2018-07-18 RX ORDER — SODIUM CHLORIDE 0.9 % (FLUSH) 0.9 %
10 SYRINGE (ML) INJECTION PRN
Status: CANCELLED | OUTPATIENT
Start: 2018-07-18

## 2018-07-18 RX ORDER — 0.9 % SODIUM CHLORIDE 0.9 %
250 INTRAVENOUS SOLUTION INTRAVENOUS ONCE
Status: CANCELLED | OUTPATIENT
Start: 2018-07-18 | End: 2018-07-18

## 2018-07-18 ASSESSMENT — PAIN SCALES - GENERAL: PAINLEVEL_OUTOF10: 0

## 2018-07-18 NOTE — PLAN OF CARE
Problem: Wound:  Goal: Will show signs of wound healing; wound closure and no evidence of infection  Will show signs of wound healing; wound closure and no evidence of infection   Outcome: Ongoing  Right lateral foot wound with more necrotic tissue this week, debridement per Dr Yuri Strickland. Dr Lolis Miller plans to take to the OR next Thursday for right foot wounds. Will cont. With current wound care regime this week. Right Ischial wound alin-wound skin improved this week, wound remains with necrotic tissue, no debridement. Back wound stable, debridement per Dr Yuri Strickland, will cont. With NPWT as ordered. Left leg & foot wounds remain under care of Dr. Lolis Miller. Dr Yuri Strickland discussed lab results with patient & anemia. Will plan for patient to get 2 units PRBC's in specialized services on 7/19/18. Pt. To cont. With Zosyn IV antibiotic as ordered. F/u in [address] [place] [address],[address] in 2 weeks as ordered. Discharge instructions reviewed with patient, all questions answered, copy given to patient. Dressings were applied to all wounds per M.D. Instructions at this visit.

## 2018-07-19 ENCOUNTER — OFFICE VISIT (OUTPATIENT)
Dept: INTERNAL MEDICINE CLINIC | Age: 53
End: 2018-07-19

## 2018-07-19 ENCOUNTER — HOSPITAL ENCOUNTER (OUTPATIENT)
Dept: NURSING | Age: 53
Setting detail: INFUSION SERIES
Discharge: HOME | DRG: 291 | End: 2018-07-19
Payer: MEDICARE

## 2018-07-19 VITALS — HEART RATE: 60 BPM | RESPIRATION RATE: 18 BRPM | SYSTOLIC BLOOD PRESSURE: 122 MMHG | DIASTOLIC BLOOD PRESSURE: 75 MMHG

## 2018-07-19 VITALS
HEART RATE: 86 BPM | RESPIRATION RATE: 16 BRPM | BODY MASS INDEX: 34.32 KG/M2 | SYSTOLIC BLOOD PRESSURE: 135 MMHG | HEIGHT: 75 IN | DIASTOLIC BLOOD PRESSURE: 87 MMHG | TEMPERATURE: 98.1 F | WEIGHT: 276 LBS

## 2018-07-19 DIAGNOSIS — I50.22 CHRONIC SYSTOLIC HEART FAILURE (HCC): Chronic | ICD-10-CM

## 2018-07-19 DIAGNOSIS — L89.214 PRESSURE ULCER OF RIGHT HIP, STAGE 4 (HCC): ICD-10-CM

## 2018-07-19 DIAGNOSIS — D50.9 HYPOCHROMIC MICROCYTIC ANEMIA: ICD-10-CM

## 2018-07-19 DIAGNOSIS — E03.9 ACQUIRED HYPOTHYROIDISM: ICD-10-CM

## 2018-07-19 DIAGNOSIS — E11.622 TYPE 2 DIABETES MELLITUS WITH OTHER SKIN ULCER, WITH LONG-TERM CURRENT USE OF INSULIN (HCC): Chronic | ICD-10-CM

## 2018-07-19 DIAGNOSIS — Z79.4 TYPE 2 DIABETES MELLITUS WITH OTHER SKIN ULCER, WITH LONG-TERM CURRENT USE OF INSULIN (HCC): Chronic | ICD-10-CM

## 2018-07-19 DIAGNOSIS — Z01.818 PRE-OP EXAM: Primary | ICD-10-CM

## 2018-07-19 DIAGNOSIS — L89.894 PRESSURE ULCER OF RIGHT FOOT, STAGE 4 (HCC): ICD-10-CM

## 2018-07-19 LAB
BLOOD BANK DISPENSE STATUS: NORMAL
BLOOD BANK DISPENSE STATUS: NORMAL
BLOOD BANK PRODUCT CODE: NORMAL
BLOOD BANK PRODUCT CODE: NORMAL
BPU ID: NORMAL
BPU ID: NORMAL
DESCRIPTION BLOOD BANK: NORMAL
DESCRIPTION BLOOD BANK: NORMAL

## 2018-07-19 PROCEDURE — 2580000003 HC RX 258: Performed by: INTERNAL MEDICINE

## 2018-07-19 PROCEDURE — 2022F DILAT RTA XM EVC RTNOPTHY: CPT | Performed by: INTERNAL MEDICINE

## 2018-07-19 PROCEDURE — 3017F COLORECTAL CA SCREEN DOC REV: CPT | Performed by: INTERNAL MEDICINE

## 2018-07-19 PROCEDURE — G8427 DOCREV CUR MEDS BY ELIG CLIN: HCPCS | Performed by: INTERNAL MEDICINE

## 2018-07-19 PROCEDURE — 99214 OFFICE O/P EST MOD 30 MIN: CPT | Performed by: INTERNAL MEDICINE

## 2018-07-19 PROCEDURE — G8598 ASA/ANTIPLAT THER USED: HCPCS | Performed by: INTERNAL MEDICINE

## 2018-07-19 PROCEDURE — 1036F TOBACCO NON-USER: CPT | Performed by: INTERNAL MEDICINE

## 2018-07-19 PROCEDURE — P9016 RBC LEUKOCYTES REDUCED: HCPCS

## 2018-07-19 PROCEDURE — 99211 OFF/OP EST MAY X REQ PHY/QHP: CPT

## 2018-07-19 PROCEDURE — 36430 TRANSFUSION BLD/BLD COMPNT: CPT

## 2018-07-19 PROCEDURE — G8417 CALC BMI ABV UP PARAM F/U: HCPCS | Performed by: INTERNAL MEDICINE

## 2018-07-19 PROCEDURE — 6360000002 HC RX W HCPCS: Performed by: INTERNAL MEDICINE

## 2018-07-19 PROCEDURE — 3044F HG A1C LEVEL LT 7.0%: CPT | Performed by: INTERNAL MEDICINE

## 2018-07-19 PROCEDURE — 3E04329 INTRODUCTION OF OTHER ANTI-INFECTIVE INTO CENTRAL VEIN, PERCUTANEOUS APPROACH: ICD-10-PCS | Performed by: INTERNAL MEDICINE

## 2018-07-19 PROCEDURE — 96374 THER/PROPH/DIAG INJ IV PUSH: CPT

## 2018-07-19 RX ORDER — 0.9 % SODIUM CHLORIDE 0.9 %
250 INTRAVENOUS SOLUTION INTRAVENOUS ONCE
Status: COMPLETED | OUTPATIENT
Start: 2018-07-19 | End: 2018-07-19

## 2018-07-19 RX ORDER — SODIUM CHLORIDE 0.9 % (FLUSH) 0.9 %
10 SYRINGE (ML) INJECTION PRN
Status: DISCONTINUED | OUTPATIENT
Start: 2018-07-19 | End: 2018-07-20 | Stop reason: HOSPADM

## 2018-07-19 RX ORDER — FUROSEMIDE 10 MG/ML
40 INJECTION INTRAMUSCULAR; INTRAVENOUS ONCE
Status: COMPLETED | OUTPATIENT
Start: 2018-07-19 | End: 2018-07-19

## 2018-07-19 RX ADMIN — SODIUM CHLORIDE 250 ML: 9 INJECTION, SOLUTION INTRAVENOUS at 08:20

## 2018-07-19 RX ADMIN — Medication 10 ML: at 13:45

## 2018-07-19 RX ADMIN — Medication 10 ML: at 08:20

## 2018-07-19 RX ADMIN — FUROSEMIDE 40 MG: 10 INJECTION, SOLUTION INTRAMUSCULAR; INTRAVENOUS at 10:40

## 2018-07-19 ASSESSMENT — PAIN DESCRIPTION - PAIN TYPE: TYPE: CHRONIC PAIN

## 2018-07-19 ASSESSMENT — ENCOUNTER SYMPTOMS
CHEST TIGHTNESS: 0
RHINORRHEA: 0
SHORTNESS OF BREATH: 0
COLOR CHANGE: 0

## 2018-07-19 ASSESSMENT — PAIN DESCRIPTION - ONSET: ONSET: ON-GOING

## 2018-07-19 ASSESSMENT — PAIN SCALES - GENERAL: PAINLEVEL_OUTOF10: 4

## 2018-07-19 ASSESSMENT — PAIN DESCRIPTION - ORIENTATION: ORIENTATION: LEFT

## 2018-07-19 ASSESSMENT — PAIN DESCRIPTION - DESCRIPTORS: DESCRIPTORS: BURNING;STABBING

## 2018-07-19 ASSESSMENT — PAIN DESCRIPTION - FREQUENCY: FREQUENCY: CONTINUOUS

## 2018-07-19 ASSESSMENT — PAIN DESCRIPTION - LOCATION: LOCATION: SHOULDER;NECK

## 2018-07-19 NOTE — PROGRESS NOTES
Pt was transferred to stretcher per 4 staff then taken to wound care where he was placed back in his wheel chair using the denice lift  Pt essie well  Pt discharged via motorized w/c in stable condition

## 2018-07-19 NOTE — PROGRESS NOTES
1st unit of PRBC's infused without any signs of adverse reactions  PICC line flushed with 10 cc NS using start stop method then Lasix 40 mg given IVP over 4 mins  PICC line re flushed with 10 cc NS using start stop method then 2nd unit of PRBC's started  Pt essie well  Will continue to monitor

## 2018-07-19 NOTE — PROGRESS NOTES
No changes noted  Pt without c/o's  No signs of adverse reactions noted  Will continue to monitor  Blood infusing well

## 2018-07-19 NOTE — PROGRESS NOTES
Blood infusing well  Pt without c/o's  Has tolerated breakfast well  No changes noted  Will continue to monitor

## 2018-07-19 NOTE — PROGRESS NOTES
Blood tubing clear PICC line flushed with 20 cc NS using start stop method then alcohol cap applied  Pt essie well  Pt has been straight cath ing self x 3 today for clear yellow urine  approx 500 cc total   No c/o's voiced  Discharge instructions reviewed with pt and copy was given
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
## 2018-07-19 NOTE — PROGRESS NOTES
Blood infusing well without any signs of adverse reactions  Pt without c/o's  Will continue to monitor

## 2018-07-19 NOTE — PROGRESS NOTES
2nd unit of PRBC's infused without any signs of adverse reactions  NS infusing to clear tubing  Pt essie well

## 2018-07-19 NOTE — PROGRESS NOTES
Pt here via motorized w/c for a blood transfusion  Current H&H is 7.4 & 24.4 pt reports he has had blood before and denies any questions about it  Consent for blood obtained  Pt is a paraplegic  Pt taken to wound care and then was placed on a cart using the denice lift  Pt was brought back tot he unit and transferred to a bed for the transfusion  Pt essie well  Pt has a dual lumen PICC line  Purple port is patent and it flushes easily with 10 cc NS Good blood return obtained  1st unit of PRBC's started  Pt essie well  Will monitor
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
## 2018-07-20 RX ORDER — FLUCONAZOLE 100 MG/1
TABLET ORAL
Qty: 8 TABLET | Refills: 0 | Status: ON HOLD | OUTPATIENT
Start: 2018-07-20 | End: 2018-07-31 | Stop reason: HOSPADM

## 2018-07-21 ENCOUNTER — APPOINTMENT (OUTPATIENT)
Dept: GENERAL RADIOLOGY | Age: 53
DRG: 291 | End: 2018-07-21
Payer: MEDICARE

## 2018-07-21 ENCOUNTER — HOSPITAL ENCOUNTER (INPATIENT)
Age: 53
LOS: 3 days | Discharge: OTHER INSTITUTION | DRG: 291 | End: 2018-07-24
Attending: EMERGENCY MEDICINE | Admitting: INTERNAL MEDICINE
Payer: MEDICARE

## 2018-07-21 ENCOUNTER — APPOINTMENT (OUTPATIENT)
Dept: CT IMAGING | Age: 53
DRG: 291 | End: 2018-07-21
Payer: MEDICARE

## 2018-07-21 DIAGNOSIS — R06.00 DYSPNEA, UNSPECIFIED TYPE: Primary | ICD-10-CM

## 2018-07-21 DIAGNOSIS — I26.99 OTHER ACUTE PULMONARY EMBOLISM WITHOUT ACUTE COR PULMONALE (HCC): ICD-10-CM

## 2018-07-21 LAB
A/G RATIO: 0.7 (ref 1.1–2.2)
ALBUMIN SERPL-MCNC: 3.2 G/DL (ref 3.4–5)
ALP BLD-CCNC: 114 U/L (ref 40–129)
ALT SERPL-CCNC: 6 U/L (ref 10–40)
ANION GAP SERPL CALCULATED.3IONS-SCNC: 13 MMOL/L (ref 3–16)
APTT: 37.4 SEC (ref 26–36)
AST SERPL-CCNC: 11 U/L (ref 15–37)
BASOPHILS ABSOLUTE: 0.1 K/UL (ref 0–0.2)
BASOPHILS RELATIVE PERCENT: 0.9 %
BILIRUB SERPL-MCNC: 0.4 MG/DL (ref 0–1)
BUN BLDV-MCNC: 32 MG/DL (ref 7–20)
CALCIUM SERPL-MCNC: 8.7 MG/DL (ref 8.3–10.6)
CHLORIDE BLD-SCNC: 92 MMOL/L (ref 99–110)
CO2: 27 MMOL/L (ref 21–32)
CREAT SERPL-MCNC: 0.9 MG/DL (ref 0.9–1.3)
D DIMER: 778 NG/ML DDU (ref 0–229)
EOSINOPHILS ABSOLUTE: 0.2 K/UL (ref 0–0.6)
EOSINOPHILS RELATIVE PERCENT: 2.4 %
GFR AFRICAN AMERICAN: >60
GFR NON-AFRICAN AMERICAN: >60
GLOBULIN: 4.5 G/DL
GLUCOSE BLD-MCNC: 163 MG/DL (ref 70–99)
GLUCOSE BLD-MCNC: 55 MG/DL (ref 70–99)
GLUCOSE BLD-MCNC: 81 MG/DL (ref 70–99)
HCT VFR BLD CALC: 28.2 % (ref 40.5–52.5)
HEMOGLOBIN: 9 G/DL (ref 13.5–17.5)
INR BLD: 1.88 (ref 0.86–1.14)
LYMPHOCYTES ABSOLUTE: 1.5 K/UL (ref 1–5.1)
LYMPHOCYTES RELATIVE PERCENT: 14.7 %
MCH RBC QN AUTO: 23.4 PG (ref 26–34)
MCHC RBC AUTO-ENTMCNC: 31.8 G/DL (ref 31–36)
MCV RBC AUTO: 73.5 FL (ref 80–100)
MONOCYTES ABSOLUTE: 0.9 K/UL (ref 0–1.3)
MONOCYTES RELATIVE PERCENT: 8.7 %
NEUTROPHILS ABSOLUTE: 7.2 K/UL (ref 1.7–7.7)
NEUTROPHILS RELATIVE PERCENT: 73.3 %
PDW BLD-RTO: 22.7 % (ref 12.4–15.4)
PERFORMED ON: ABNORMAL
PERFORMED ON: NORMAL
PLATELET # BLD: 396 K/UL (ref 135–450)
PMV BLD AUTO: 8 FL (ref 5–10.5)
POTASSIUM SERPL-SCNC: 3.4 MMOL/L (ref 3.5–5.1)
PRO-BNP: ABNORMAL PG/ML (ref 0–124)
PROTHROMBIN TIME: 21.4 SEC (ref 9.8–13)
RBC # BLD: 3.83 M/UL (ref 4.2–5.9)
SODIUM BLD-SCNC: 132 MMOL/L (ref 136–145)
TOTAL PROTEIN: 7.7 G/DL (ref 6.4–8.2)
TROPONIN: 0.14 NG/ML
WBC # BLD: 9.9 K/UL (ref 4–11)

## 2018-07-21 PROCEDURE — 93010 ELECTROCARDIOGRAM REPORT: CPT | Performed by: INTERNAL MEDICINE

## 2018-07-21 PROCEDURE — 71045 X-RAY EXAM CHEST 1 VIEW: CPT

## 2018-07-21 PROCEDURE — 71260 CT THORAX DX C+: CPT

## 2018-07-21 PROCEDURE — 85610 PROTHROMBIN TIME: CPT

## 2018-07-21 PROCEDURE — 96372 THER/PROPH/DIAG INJ SC/IM: CPT

## 2018-07-21 PROCEDURE — 80053 COMPREHEN METABOLIC PANEL: CPT

## 2018-07-21 PROCEDURE — 6370000000 HC RX 637 (ALT 250 FOR IP): Performed by: INTERNAL MEDICINE

## 2018-07-21 PROCEDURE — 85025 COMPLETE CBC W/AUTO DIFF WBC: CPT

## 2018-07-21 PROCEDURE — 85730 THROMBOPLASTIN TIME PARTIAL: CPT

## 2018-07-21 PROCEDURE — 96374 THER/PROPH/DIAG INJ IV PUSH: CPT

## 2018-07-21 PROCEDURE — 2060000000 HC ICU INTERMEDIATE R&B

## 2018-07-21 PROCEDURE — 6360000002 HC RX W HCPCS: Performed by: EMERGENCY MEDICINE

## 2018-07-21 PROCEDURE — 85379 FIBRIN DEGRADATION QUANT: CPT

## 2018-07-21 PROCEDURE — 6360000004 HC RX CONTRAST MEDICATION: Performed by: EMERGENCY MEDICINE

## 2018-07-21 PROCEDURE — 83880 ASSAY OF NATRIURETIC PEPTIDE: CPT

## 2018-07-21 PROCEDURE — 2580000003 HC RX 258: Performed by: INTERNAL MEDICINE

## 2018-07-21 PROCEDURE — 93005 ELECTROCARDIOGRAM TRACING: CPT | Performed by: EMERGENCY MEDICINE

## 2018-07-21 PROCEDURE — 84484 ASSAY OF TROPONIN QUANT: CPT

## 2018-07-21 PROCEDURE — 6360000002 HC RX W HCPCS: Performed by: INTERNAL MEDICINE

## 2018-07-21 PROCEDURE — 99285 EMERGENCY DEPT VISIT HI MDM: CPT

## 2018-07-21 RX ORDER — NICOTINE POLACRILEX 4 MG
15 LOZENGE BUCCAL PRN
Status: DISCONTINUED | OUTPATIENT
Start: 2018-07-21 | End: 2018-07-24 | Stop reason: HOSPADM

## 2018-07-21 RX ORDER — FLUCONAZOLE 100 MG/1
100 TABLET ORAL DAILY
Status: DISCONTINUED | OUTPATIENT
Start: 2018-07-21 | End: 2018-07-24 | Stop reason: HOSPADM

## 2018-07-21 RX ORDER — TRAZODONE HYDROCHLORIDE 50 MG/1
25 TABLET ORAL NIGHTLY
Status: DISCONTINUED | OUTPATIENT
Start: 2018-07-21 | End: 2018-07-24 | Stop reason: HOSPADM

## 2018-07-21 RX ORDER — DEXTROSE MONOHYDRATE 25 G/50ML
12.5 INJECTION, SOLUTION INTRAVENOUS PRN
Status: DISCONTINUED | OUTPATIENT
Start: 2018-07-21 | End: 2018-07-24 | Stop reason: HOSPADM

## 2018-07-21 RX ORDER — PROMETHAZINE HYDROCHLORIDE 25 MG/1
25 TABLET ORAL EVERY 6 HOURS PRN
Status: DISCONTINUED | OUTPATIENT
Start: 2018-07-21 | End: 2018-07-24 | Stop reason: HOSPADM

## 2018-07-21 RX ORDER — ASPIRIN 81 MG/1
81 TABLET, CHEWABLE ORAL DAILY
Status: DISCONTINUED | OUTPATIENT
Start: 2018-07-21 | End: 2018-07-24 | Stop reason: HOSPADM

## 2018-07-21 RX ORDER — TORSEMIDE 20 MG/1
40 TABLET ORAL NIGHTLY
Status: DISCONTINUED | OUTPATIENT
Start: 2018-07-21 | End: 2018-07-22

## 2018-07-21 RX ORDER — METOPROLOL SUCCINATE 50 MG/1
100 TABLET, EXTENDED RELEASE ORAL NIGHTLY
Status: DISCONTINUED | OUTPATIENT
Start: 2018-07-21 | End: 2018-07-24 | Stop reason: HOSPADM

## 2018-07-21 RX ORDER — CYCLOBENZAPRINE HCL 10 MG
10 TABLET ORAL 2 TIMES DAILY PRN
Status: DISCONTINUED | OUTPATIENT
Start: 2018-07-21 | End: 2018-07-24 | Stop reason: HOSPADM

## 2018-07-21 RX ORDER — NITROGLYCERIN 0.4 MG/1
0.4 TABLET SUBLINGUAL EVERY 5 MIN PRN
Status: DISCONTINUED | OUTPATIENT
Start: 2018-07-21 | End: 2018-07-24 | Stop reason: HOSPADM

## 2018-07-21 RX ORDER — TORSEMIDE 20 MG/1
40 TABLET ORAL DAILY
Status: DISCONTINUED | OUTPATIENT
Start: 2018-07-21 | End: 2018-07-21

## 2018-07-21 RX ORDER — SODIUM CHLORIDE 0.9 % (FLUSH) 0.9 %
10 SYRINGE (ML) INJECTION PRN
Status: DISCONTINUED | OUTPATIENT
Start: 2018-07-21 | End: 2018-07-24 | Stop reason: HOSPADM

## 2018-07-21 RX ORDER — SODIUM CHLORIDE 0.9 % (FLUSH) 0.9 %
10 SYRINGE (ML) INJECTION EVERY 12 HOURS SCHEDULED
Status: DISCONTINUED | OUTPATIENT
Start: 2018-07-21 | End: 2018-07-24 | Stop reason: HOSPADM

## 2018-07-21 RX ORDER — PANTOPRAZOLE SODIUM 40 MG/1
40 TABLET, DELAYED RELEASE ORAL DAILY
Status: DISCONTINUED | OUTPATIENT
Start: 2018-07-21 | End: 2018-07-24 | Stop reason: HOSPADM

## 2018-07-21 RX ORDER — DOCUSATE SODIUM 100 MG/1
200 CAPSULE, LIQUID FILLED ORAL DAILY
Status: DISCONTINUED | OUTPATIENT
Start: 2018-07-21 | End: 2018-07-24 | Stop reason: HOSPADM

## 2018-07-21 RX ORDER — ATORVASTATIN CALCIUM 40 MG/1
80 TABLET, FILM COATED ORAL NIGHTLY
Status: DISCONTINUED | OUTPATIENT
Start: 2018-07-21 | End: 2018-07-21 | Stop reason: SDUPTHER

## 2018-07-21 RX ORDER — FUROSEMIDE 10 MG/ML
20 INJECTION INTRAMUSCULAR; INTRAVENOUS ONCE
Status: COMPLETED | OUTPATIENT
Start: 2018-07-21 | End: 2018-07-21

## 2018-07-21 RX ORDER — FERROUS SULFATE 325(65) MG
325 TABLET ORAL
Status: DISCONTINUED | OUTPATIENT
Start: 2018-07-22 | End: 2018-07-24 | Stop reason: HOSPADM

## 2018-07-21 RX ORDER — ONDANSETRON 2 MG/ML
4 INJECTION INTRAMUSCULAR; INTRAVENOUS EVERY 6 HOURS PRN
Status: DISCONTINUED | OUTPATIENT
Start: 2018-07-21 | End: 2018-07-24 | Stop reason: HOSPADM

## 2018-07-21 RX ORDER — CLOPIDOGREL BISULFATE 75 MG/1
75 TABLET ORAL DAILY
Status: DISCONTINUED | OUTPATIENT
Start: 2018-07-21 | End: 2018-07-21

## 2018-07-21 RX ORDER — GLUCAGON 1 MG/ML
1 KIT INJECTION PRN
Status: DISCONTINUED | OUTPATIENT
Start: 2018-07-21 | End: 2018-07-24 | Stop reason: HOSPADM

## 2018-07-21 RX ORDER — DEXTROSE MONOHYDRATE 50 MG/ML
100 INJECTION, SOLUTION INTRAVENOUS PRN
Status: DISCONTINUED | OUTPATIENT
Start: 2018-07-21 | End: 2018-07-24 | Stop reason: HOSPADM

## 2018-07-21 RX ORDER — SENNA PLUS 8.6 MG/1
2 TABLET ORAL NIGHTLY
Status: DISCONTINUED | OUTPATIENT
Start: 2018-07-21 | End: 2018-07-24 | Stop reason: HOSPADM

## 2018-07-21 RX ORDER — OXYCODONE HYDROCHLORIDE 5 MG/1
5 TABLET ORAL EVERY 6 HOURS PRN
Status: DISCONTINUED | OUTPATIENT
Start: 2018-07-21 | End: 2018-07-24 | Stop reason: HOSPADM

## 2018-07-21 RX ORDER — CLOPIDOGREL BISULFATE 75 MG/1
75 TABLET ORAL NIGHTLY
Status: DISCONTINUED | OUTPATIENT
Start: 2018-07-21 | End: 2018-07-24

## 2018-07-21 RX ORDER — ROSUVASTATIN CALCIUM 10 MG/1
20 TABLET, COATED ORAL NIGHTLY
Status: DISCONTINUED | OUTPATIENT
Start: 2018-07-21 | End: 2018-07-24 | Stop reason: HOSPADM

## 2018-07-21 RX ORDER — METOPROLOL SUCCINATE 50 MG/1
100 TABLET, EXTENDED RELEASE ORAL DAILY
Status: DISCONTINUED | OUTPATIENT
Start: 2018-07-21 | End: 2018-07-21

## 2018-07-21 RX ADMIN — ROSUVASTATIN CALCIUM 20 MG: 10 TABLET, FILM COATED ORAL at 23:05

## 2018-07-21 RX ADMIN — INSULIN LISPRO 1 UNITS: 100 INJECTION, SOLUTION INTRAVENOUS; SUBCUTANEOUS at 21:11

## 2018-07-21 RX ADMIN — TRAZODONE HYDROCHLORIDE 25 MG: 50 TABLET ORAL at 23:01

## 2018-07-21 RX ADMIN — ENOXAPARIN SODIUM 120 MG: 120 INJECTION SUBCUTANEOUS at 13:55

## 2018-07-21 RX ADMIN — FUROSEMIDE 20 MG: 10 INJECTION, SOLUTION INTRAMUSCULAR; INTRAVENOUS at 11:16

## 2018-07-21 RX ADMIN — OXYCODONE HYDROCHLORIDE 5 MG: 5 TABLET ORAL at 23:01

## 2018-07-21 RX ADMIN — SENNOSIDES 17.2 MG: 8.6 TABLET, FILM COATED ORAL at 21:10

## 2018-07-21 RX ADMIN — ASPIRIN 81 MG 81 MG: 81 TABLET ORAL at 21:10

## 2018-07-21 RX ADMIN — METOPROLOL SUCCINATE 100 MG: 50 TABLET, EXTENDED RELEASE ORAL at 21:10

## 2018-07-21 RX ADMIN — PANTOPRAZOLE SODIUM 40 MG: 40 TABLET, DELAYED RELEASE ORAL at 17:38

## 2018-07-21 RX ADMIN — Medication 10 ML: at 21:16

## 2018-07-21 RX ADMIN — IOPAMIDOL 85 ML: 755 INJECTION, SOLUTION INTRAVENOUS at 12:06

## 2018-07-21 RX ADMIN — DOCUSATE SODIUM 200 MG: 100 CAPSULE, LIQUID FILLED ORAL at 17:38

## 2018-07-21 RX ADMIN — TORSEMIDE 40 MG: 20 TABLET ORAL at 21:10

## 2018-07-21 RX ADMIN — CLOPIDOGREL BISULFATE 75 MG: 75 TABLET ORAL at 21:10

## 2018-07-21 RX ADMIN — ENOXAPARIN SODIUM 120 MG: 120 INJECTION SUBCUTANEOUS at 21:11

## 2018-07-21 RX ADMIN — FLUCONAZOLE 100 MG: 100 TABLET ORAL at 17:38

## 2018-07-21 ASSESSMENT — PAIN DESCRIPTION - PROGRESSION
CLINICAL_PROGRESSION: NOT CHANGED

## 2018-07-21 ASSESSMENT — PAIN DESCRIPTION - ORIENTATION
ORIENTATION: LEFT

## 2018-07-21 ASSESSMENT — PAIN DESCRIPTION - PAIN TYPE
TYPE: CHRONIC PAIN

## 2018-07-21 ASSESSMENT — PAIN DESCRIPTION - FREQUENCY
FREQUENCY: CONTINUOUS
FREQUENCY: CONTINUOUS

## 2018-07-21 ASSESSMENT — PAIN DESCRIPTION - LOCATION
LOCATION: SHOULDER;NECK

## 2018-07-21 ASSESSMENT — PAIN DESCRIPTION - DESCRIPTORS
DESCRIPTORS: CONSTANT

## 2018-07-21 ASSESSMENT — PAIN SCALES - GENERAL
PAINLEVEL_OUTOF10: 4
PAINLEVEL_OUTOF10: 3
PAINLEVEL_OUTOF10: 4
PAINLEVEL_OUTOF10: 4

## 2018-07-21 ASSESSMENT — PAIN DESCRIPTION - ONSET
ONSET: ON-GOING
ONSET: ON-GOING

## 2018-07-22 LAB
ANION GAP SERPL CALCULATED.3IONS-SCNC: 16 MMOL/L (ref 3–16)
BASOPHILS ABSOLUTE: 0.2 K/UL (ref 0–0.2)
BASOPHILS RELATIVE PERCENT: 1.8 %
BUN BLDV-MCNC: 32 MG/DL (ref 7–20)
CALCIUM SERPL-MCNC: 8.4 MG/DL (ref 8.3–10.6)
CHLORIDE BLD-SCNC: 92 MMOL/L (ref 99–110)
CO2: 27 MMOL/L (ref 21–32)
CREAT SERPL-MCNC: 1.1 MG/DL (ref 0.9–1.3)
EOSINOPHILS ABSOLUTE: 0.2 K/UL (ref 0–0.6)
EOSINOPHILS RELATIVE PERCENT: 2 %
GFR AFRICAN AMERICAN: >60
GFR NON-AFRICAN AMERICAN: >60
GLUCOSE BLD-MCNC: 100 MG/DL (ref 70–99)
GLUCOSE BLD-MCNC: 119 MG/DL (ref 70–99)
GLUCOSE BLD-MCNC: 146 MG/DL (ref 70–99)
GLUCOSE BLD-MCNC: 157 MG/DL (ref 70–99)
GLUCOSE BLD-MCNC: 185 MG/DL (ref 70–99)
GLUCOSE BLD-MCNC: 276 MG/DL (ref 70–99)
HCT VFR BLD CALC: 28.5 % (ref 40.5–52.5)
HEMOGLOBIN: 8.9 G/DL (ref 13.5–17.5)
LYMPHOCYTES ABSOLUTE: 1.4 K/UL (ref 1–5.1)
LYMPHOCYTES RELATIVE PERCENT: 13.6 %
MCH RBC QN AUTO: 23 PG (ref 26–34)
MCHC RBC AUTO-ENTMCNC: 31.3 G/DL (ref 31–36)
MCV RBC AUTO: 73.5 FL (ref 80–100)
MONOCYTES ABSOLUTE: 1 K/UL (ref 0–1.3)
MONOCYTES RELATIVE PERCENT: 9.7 %
NEUTROPHILS ABSOLUTE: 7.3 K/UL (ref 1.7–7.7)
NEUTROPHILS RELATIVE PERCENT: 72.9 %
PDW BLD-RTO: 22.5 % (ref 12.4–15.4)
PERFORMED ON: ABNORMAL
PLATELET # BLD: 422 K/UL (ref 135–450)
PMV BLD AUTO: 8.7 FL (ref 5–10.5)
POTASSIUM REFLEX MAGNESIUM: 3.6 MMOL/L (ref 3.5–5.1)
RBC # BLD: 3.88 M/UL (ref 4.2–5.9)
SODIUM BLD-SCNC: 135 MMOL/L (ref 136–145)
WBC # BLD: 10 K/UL (ref 4–11)

## 2018-07-22 PROCEDURE — 2580000003 HC RX 258: Performed by: INTERNAL MEDICINE

## 2018-07-22 PROCEDURE — 6370000000 HC RX 637 (ALT 250 FOR IP): Performed by: INTERNAL MEDICINE

## 2018-07-22 PROCEDURE — 85025 COMPLETE CBC W/AUTO DIFF WBC: CPT

## 2018-07-22 PROCEDURE — 6370000000 HC RX 637 (ALT 250 FOR IP): Performed by: HOSPITALIST

## 2018-07-22 PROCEDURE — 6360000002 HC RX W HCPCS: Performed by: HOSPITALIST

## 2018-07-22 PROCEDURE — 2060000000 HC ICU INTERMEDIATE R&B

## 2018-07-22 PROCEDURE — 36592 COLLECT BLOOD FROM PICC: CPT

## 2018-07-22 PROCEDURE — 80048 BASIC METABOLIC PNL TOTAL CA: CPT

## 2018-07-22 PROCEDURE — 94761 N-INVAS EAR/PLS OXIMETRY MLT: CPT

## 2018-07-22 PROCEDURE — 2700000000 HC OXYGEN THERAPY PER DAY

## 2018-07-22 PROCEDURE — 99223 1ST HOSP IP/OBS HIGH 75: CPT | Performed by: INTERNAL MEDICINE

## 2018-07-22 RX ORDER — FUROSEMIDE 10 MG/ML
40 INJECTION INTRAMUSCULAR; INTRAVENOUS DAILY
Status: DISCONTINUED | OUTPATIENT
Start: 2018-07-22 | End: 2018-07-22

## 2018-07-22 RX ORDER — FUROSEMIDE 10 MG/ML
40 INJECTION INTRAMUSCULAR; INTRAVENOUS 2 TIMES DAILY
Status: DISCONTINUED | OUTPATIENT
Start: 2018-07-22 | End: 2018-07-24

## 2018-07-22 RX ORDER — INSULIN GLARGINE 100 [IU]/ML
25 INJECTION, SOLUTION SUBCUTANEOUS 2 TIMES DAILY
Status: DISCONTINUED | OUTPATIENT
Start: 2018-07-22 | End: 2018-07-24

## 2018-07-22 RX ADMIN — Medication 10 ML: at 10:15

## 2018-07-22 RX ADMIN — ASPIRIN 81 MG 81 MG: 81 TABLET ORAL at 21:55

## 2018-07-22 RX ADMIN — FLUCONAZOLE 100 MG: 100 TABLET ORAL at 10:16

## 2018-07-22 RX ADMIN — DOCUSATE SODIUM 200 MG: 100 CAPSULE, LIQUID FILLED ORAL at 10:15

## 2018-07-22 RX ADMIN — FERROUS SULFATE TAB 325 MG (65 MG ELEMENTAL FE) 325 MG: 325 (65 FE) TAB at 10:15

## 2018-07-22 RX ADMIN — Medication 10 ML: at 22:02

## 2018-07-22 RX ADMIN — Medication 1600 MG: at 10:16

## 2018-07-22 RX ADMIN — INSULIN LISPRO 1 UNITS: 100 INJECTION, SOLUTION INTRAVENOUS; SUBCUTANEOUS at 17:17

## 2018-07-22 RX ADMIN — METOPROLOL SUCCINATE 100 MG: 50 TABLET, EXTENDED RELEASE ORAL at 21:55

## 2018-07-22 RX ADMIN — APIXABAN 5 MG: 5 TABLET, FILM COATED ORAL at 10:15

## 2018-07-22 RX ADMIN — SENNOSIDES 17.2 MG: 8.6 TABLET, FILM COATED ORAL at 21:54

## 2018-07-22 RX ADMIN — MAGNESIUM OXIDE TAB 400 MG (241.3 MG ELEMENTAL MG) 2000 MG: 400 (241.3 MG) TAB at 21:54

## 2018-07-22 RX ADMIN — MAGNESIUM OXIDE TAB 400 MG (241.3 MG ELEMENTAL MG) 2000 MG: 400 (241.3 MG) TAB at 02:07

## 2018-07-22 RX ADMIN — APIXABAN 5 MG: 5 TABLET, FILM COATED ORAL at 21:54

## 2018-07-22 RX ADMIN — FUROSEMIDE 40 MG: 10 INJECTION, SOLUTION INTRAMUSCULAR; INTRAVENOUS at 10:16

## 2018-07-22 RX ADMIN — INSULIN GLARGINE 25 UNITS: 100 INJECTION, SOLUTION SUBCUTANEOUS at 22:00

## 2018-07-22 RX ADMIN — CLOPIDOGREL BISULFATE 75 MG: 75 TABLET ORAL at 21:55

## 2018-07-22 RX ADMIN — INSULIN GLARGINE 25 UNITS: 100 INJECTION, SOLUTION SUBCUTANEOUS at 13:44

## 2018-07-22 RX ADMIN — FUROSEMIDE 40 MG: 10 INJECTION, SOLUTION INTRAMUSCULAR; INTRAVENOUS at 17:18

## 2018-07-22 RX ADMIN — PANTOPRAZOLE SODIUM 40 MG: 40 TABLET, DELAYED RELEASE ORAL at 10:16

## 2018-07-22 RX ADMIN — INSULIN LISPRO 1 UNITS: 100 INJECTION, SOLUTION INTRAVENOUS; SUBCUTANEOUS at 13:03

## 2018-07-22 RX ADMIN — INSULIN LISPRO 2 UNITS: 100 INJECTION, SOLUTION INTRAVENOUS; SUBCUTANEOUS at 22:00

## 2018-07-22 RX ADMIN — TRAZODONE HYDROCHLORIDE 25 MG: 50 TABLET ORAL at 21:54
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
## 2018-07-23 PROBLEM — R06.00 DYSPNEA: Status: ACTIVE | Noted: 2018-01-07

## 2018-07-23 LAB
GLUCOSE BLD-MCNC: 157 MG/DL (ref 70–99)
GLUCOSE BLD-MCNC: 255 MG/DL (ref 70–99)
GLUCOSE BLD-MCNC: 276 MG/DL (ref 70–99)
GLUCOSE BLD-MCNC: 319 MG/DL (ref 70–99)
GLUCOSE BLD-MCNC: 354 MG/DL (ref 70–99)
PERFORMED ON: ABNORMAL

## 2018-07-23 PROCEDURE — 6370000000 HC RX 637 (ALT 250 FOR IP): Performed by: INTERNAL MEDICINE

## 2018-07-23 PROCEDURE — 2060000000 HC ICU INTERMEDIATE R&B

## 2018-07-23 PROCEDURE — 94761 N-INVAS EAR/PLS OXIMETRY MLT: CPT

## 2018-07-23 PROCEDURE — 99233 SBSQ HOSP IP/OBS HIGH 50: CPT | Performed by: INTERNAL MEDICINE

## 2018-07-23 PROCEDURE — 6360000002 HC RX W HCPCS: Performed by: HOSPITALIST

## 2018-07-23 PROCEDURE — 2580000003 HC RX 258: Performed by: INTERNAL MEDICINE

## 2018-07-23 PROCEDURE — 2700000000 HC OXYGEN THERAPY PER DAY

## 2018-07-23 PROCEDURE — 97606 NEG PRS WND THER DME>50 SQCM: CPT

## 2018-07-23 PROCEDURE — 6360000002 HC RX W HCPCS: Performed by: INTERNAL MEDICINE

## 2018-07-23 PROCEDURE — 6370000000 HC RX 637 (ALT 250 FOR IP): Performed by: HOSPITALIST

## 2018-07-23 PROCEDURE — 99232 SBSQ HOSP IP/OBS MODERATE 35: CPT | Performed by: INTERNAL MEDICINE

## 2018-07-23 PROCEDURE — 2500000003 HC RX 250 WO HCPCS: Performed by: INTERNAL MEDICINE

## 2018-07-23 RX ADMIN — FERROUS SULFATE TAB 325 MG (65 MG ELEMENTAL FE) 325 MG: 325 (65 FE) TAB at 08:15

## 2018-07-23 RX ADMIN — INSULIN GLARGINE 25 UNITS: 100 INJECTION, SOLUTION SUBCUTANEOUS at 21:42

## 2018-07-23 RX ADMIN — ENOXAPARIN SODIUM 120 MG: 120 INJECTION SUBCUTANEOUS at 21:40

## 2018-07-23 RX ADMIN — FUROSEMIDE 40 MG: 10 INJECTION, SOLUTION INTRAMUSCULAR; INTRAVENOUS at 17:21

## 2018-07-23 RX ADMIN — INSULIN LISPRO 1 UNITS: 100 INJECTION, SOLUTION INTRAVENOUS; SUBCUTANEOUS at 08:14

## 2018-07-23 RX ADMIN — INSULIN GLARGINE 25 UNITS: 100 INJECTION, SOLUTION SUBCUTANEOUS at 08:14

## 2018-07-23 RX ADMIN — PANTOPRAZOLE SODIUM 40 MG: 40 TABLET, DELAYED RELEASE ORAL at 08:15

## 2018-07-23 RX ADMIN — MICONAZOLE NITRATE: 2 POWDER TOPICAL at 08:16

## 2018-07-23 RX ADMIN — TRAZODONE HYDROCHLORIDE 25 MG: 50 TABLET ORAL at 21:39

## 2018-07-23 RX ADMIN — FUROSEMIDE 40 MG: 10 INJECTION, SOLUTION INTRAMUSCULAR; INTRAVENOUS at 08:15

## 2018-07-23 RX ADMIN — MAGNESIUM OXIDE TAB 400 MG (241.3 MG ELEMENTAL MG) 2000 MG: 400 (241.3 MG) TAB at 21:38

## 2018-07-23 RX ADMIN — INSULIN LISPRO 3 UNITS: 100 INJECTION, SOLUTION INTRAVENOUS; SUBCUTANEOUS at 21:41

## 2018-07-23 RX ADMIN — SENNOSIDES 17.2 MG: 8.6 TABLET, FILM COATED ORAL at 21:39

## 2018-07-23 RX ADMIN — INSULIN LISPRO 3 UNITS: 100 INJECTION, SOLUTION INTRAVENOUS; SUBCUTANEOUS at 11:32

## 2018-07-23 RX ADMIN — APIXABAN 5 MG: 5 TABLET, FILM COATED ORAL at 08:15

## 2018-07-23 RX ADMIN — DOCUSATE SODIUM 200 MG: 100 CAPSULE, LIQUID FILLED ORAL at 08:15

## 2018-07-23 RX ADMIN — MICONAZOLE NITRATE: 2 POWDER TOPICAL at 21:41

## 2018-07-23 RX ADMIN — ASPIRIN 81 MG 81 MG: 81 TABLET ORAL at 21:38

## 2018-07-23 RX ADMIN — ROSUVASTATIN CALCIUM 20 MG: 10 TABLET, FILM COATED ORAL at 21:37

## 2018-07-23 RX ADMIN — OXYCODONE HYDROCHLORIDE 5 MG: 5 TABLET ORAL at 21:39

## 2018-07-23 RX ADMIN — FLUCONAZOLE 100 MG: 100 TABLET ORAL at 08:15

## 2018-07-23 RX ADMIN — Medication 10 ML: at 22:06

## 2018-07-23 RX ADMIN — INSULIN LISPRO 4 UNITS: 100 INJECTION, SOLUTION INTRAVENOUS; SUBCUTANEOUS at 17:24

## 2018-07-23 RX ADMIN — Medication 1600 MG: at 08:15

## 2018-07-23 RX ADMIN — METOPROLOL SUCCINATE 100 MG: 50 TABLET, EXTENDED RELEASE ORAL at 21:37

## 2018-07-23 RX ADMIN — Medication 10 ML: at 08:14

## 2018-07-23 ASSESSMENT — PAIN DESCRIPTION - LOCATION: LOCATION: SHOULDER;NECK;BACK

## 2018-07-23 ASSESSMENT — PAIN SCALES - GENERAL
PAINLEVEL_OUTOF10: 6
PAINLEVEL_OUTOF10: 6

## 2018-07-23 ASSESSMENT — PAIN DESCRIPTION - FREQUENCY: FREQUENCY: CONTINUOUS

## 2018-07-23 ASSESSMENT — PAIN DESCRIPTION - PAIN TYPE: TYPE: CHRONIC PAIN
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
## 2018-07-23 NOTE — CARE COORDINATION
[facility]    Referral received from CM to follow for home care services. I will follow for needs, and speak with patient to verify demos. Patient has vac at home, we will find out who it's through and if patient has supplies at home. I have called [employer] to find out if the vac is serviced with them, I am waiting for a call back. Scheduled for surgery Thurs, has MD appt Wed for surg prep. UPDATE: Patient is active with [facility] wound vac. Patient has the info for contact and supplies in his cell phone, and patient has plenty of supplies in the home.   We are following for DIEGO Yadav 55  Work mobile: [phone]  [facility] office: [phone]

## 2018-07-23 NOTE — CONSULTS
TISSUE RIGHT FOOT    OTHER SURGICAL HISTORY Left 02/02/2017    debridement infected tissue left foot    OTHER SURGICAL HISTORY Left 05/25/2017    ULCER DEBRIDEMENT LEFT FOOT     OTHER SURGICAL HISTORY Left 05/10/2018    FIBULAR OSTEOTOMY LEFT LOWER LEG, DEBRIDEMENT OF MULTIPLE    OTHER SURGICAL HISTORY Left 05/15/2018    INCISION AND DRAINAGE WITH RESECTION OF NECROTIC BONE AND TISSUE, DELAYED PRIMARY CLOSURE LEFT/LEG FOOT    PTCA      SHOULDER SURGERY      rotator cuff, torn bicep    VASECTOMY      VENA CAVA FILTER PLACEMENT  2013    Removed after 3 months       FAMILY HISTORY    Family History   Problem Relation Age of Onset    Arthritis Mother     Cancer Mother     Diabetes Mother     High Blood Pressure Mother     High Cholesterol Mother     Miscarriages / [place] Mother     Cancer Father     Diabetes Father     Early Death Father     Heart Disease Father     High Cholesterol Father     High Blood Pressure Maternal Uncle     Kidney Disease Maternal Uncle     Heart Disease Maternal Grandmother        SOCIAL HISTORY    Social History   Substance Use Topics    Smoking status: Never Smoker    Smokeless tobacco: Never Used    Alcohol use No       ALLERGIES    Allergies   Allergen Reactions    Benadryl [Diphenhydramine Hcl] Anaphylaxis     Throat swelling    Bactrim [Sulfamethoxazole-Trimethoprim] Itching    Statins [Statins]     Cephalexin Rash    Morphine Anxiety     Hallucinations     Penicillins Rash    Sulfa Antibiotics Rash       MEDICATIONS    No current facility-administered medications on file prior to encounter. Current Outpatient Prescriptions on File Prior to Encounter   Medication Sig Dispense Refill    fluconazole (DIFLUCAN) 100 MG tablet Take 2 tablets today, then 1 tablet daily until they are gone.  8 tablet 0    piperacillin-tazobactam (ZOSYN) 4-0.5 GM per 100ML IVPB Infuse 4.5 g intravenously every 8 hours      promethazine (PHENERGAN) 25 MG tablet Take 1 tablet by mouth every 6 hours as needed for Nausea 60 tablet 1    magnesium oxide (MAG-OX) 400 (241.3 Mg) MG TABS tablet TAKE FOUR TABLETS BY MOUTH EVERY MORNING AND TAKE FIVE TABLETS EVERY EVENING. 270 tablet 0    Insulin Syringe-Needle U-100 ([employer] INSULIN SYRINGE) 31G X 5/16\" 1 ML MISC Use daily. DX: E11.9 100 each 3    LANTUS 100 UNIT/ML injection vial INJECT 50 UNITS INTO THE SKIN TWO TIMES A DAY 3 vial 0    torsemide (DEMADEX) 20 MG tablet Take 40 mg by mouth daily      ferrous sulfate 325 (65 Fe) MG tablet once daily and then 2 tabs at bedtime 270 tablet 0    clopidogrel (PLAVIX) 75 MG tablet TAKE ONE TABLET BY MOUTH DAILY 90 tablet 0    Insulin Lispro (HUMALOG SC) Inject into the skin 4 times daily (before meals and nightly) Sliding scale      traZODone (DESYREL) 50 MG tablet TAKE ONE- HALF (1/2) TABLET BY MOUTH ONCE NIGHTLY 45 tablet 0    rosuvastatin (CRESTOR) 20 MG tablet Take 20 mg by mouth daily      nitroGLYCERIN (NITROSTAT) 0.4 MG SL tablet up to max of 3 total doses.  If no relief after 1 dose, call 911. 25 tablet 3    metoprolol succinate (TOPROL XL) 100 MG extended release tablet Take 1 tablet by mouth daily 30 tablet 0    DOCQLACE 100 MG capsule TAKE TWO CAPSULES BY MOUTH DAILY 180 capsule 1    pantoprazole (PROTONIX) 40 MG tablet TAKE ONE TABLET BY MOUTH DAILY 90 tablet 3    FREESTYLE LITE strip USE TO TEST 5 TIMES DAILY 150 strip 9    aspirin 81 MG tablet Take 81 mg by mouth daily      apixaban (ELIQUIS) 5 MG TABS tablet Take 5 mg by mouth 2 times daily      SENNA LAX 8.6 MG tablet TAKE TWO TABLETS BY MOUTH DAILY 180 tablet 4    metFORMIN (GLUCOPHAGE) 1000 MG tablet TAKE ONE TABLET BY MOUTH TWICE A DAY FOR  BLOOD SUGAR 180 tablet 3    COMFORT ASSIST INSULIN SYRINGE 31G X 5/16\" 1 ML MISC USE AS DIRECTED TO INJECT INSULIN FOUR TIMES DAILY 100 each 10    cyclobenzaprine (FLEXERIL) 10 MG tablet Take 1 tablet by mouth 2 times daily as needed for Muscle spasms 180 tablet 1    B-D  Peripheral venous insufficiency I87.2    Ischemic cardiomyopathy I25.5    Chronic systolic heart failure ([facility]) I50.22    Pressure ulcer of right foot, stage 4 ([facility]) L89.894    Pressure ulcer of right heel, stage 4 ([facility]) L89.614    Dyspnea R06.00    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, 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    Pressure ulcer of left heel, stage 4 ([facility]) L89.624    Cellulitis of right buttock L03.317    Other pulmonary embolism without acute cor pulmonale ([facility]) I26.99    Pulmonary embolism on right ([facility]) I26.99       Measurements:  Pressure Ulcer 09/18/14 #7, right hip, stage 4 PU, onset 8/7/14, pressure (Active)   Chetna-wound Assessment Intact 7/23/2018  4:15 PM   Chetna-Wound Moisture Intact 7/5/2018  2:20 PM   Chetna-Wound Color Pink 7/5/2018  2:20 PM   Pressure Ulcer Staging Stage IV 7/23/2018  4:15 PM   Wound Assessment ADAM 7/23/2018  4:15 PM   Wound Length (cm) 0.2 cm 7/23/2018  4:15 PM   Wound Width (cm) 0.2 cm 7/23/2018  4:15 PM   Wound Depth (cm)  0.3 7/23/2018  4:15 PM   Calculated Wound Size (cm^2) (l*w) 0.04 cm^2 7/23/2018  4:15 PM   Change in Wound Size % (l*w) 92.31 7/23/2018  4:15 PM   Dressing Status Clean;Dry; Intact 7/23/2018  4:15 PM   Dressing Changed Changed/New 7/23/2018  4:15 PM   Dressing/Treatment Other (Comment) 7/23/2018  4:15 PM   Wound Cleansed Rinsed/Irrigated with saline 7/23/2018  4:15 PM   Dressing Change Due 07/17/18 7/23/2018  4:15 PM   Drainage Amount Scant 7/23/2018  4:15 PM   Drainage Description Serosanguinous 7/21/2018  6:13 PM   Odor None 7/21/2018  6:13 PM   Distance Tunneling (cm) 0 cm 7/11/2018 11:02 AM   Tunneling Position ___ O'Clock 0 7/11/2018 11:02 AM   Tunneling Maxium Distance (cm) 0 7/11/2018 11:02 AM   Undermining Starts ___ O'Clock 0 7/11/2018 11:02 AM   Undermining Ends___ O'Clock 0 7/11/2018 11:02 AM   Undermining Maxium Distance (cm) 0 7/11/2018 11:02 AM   Number of days: 1403       Pressure Ulcer 10/18/17 #28 Left heel, Pressure, Stage 4, onset 10/11/17, pressure (Active) Other (Comment) ([employer] drsg)  Wound 08/16/17 #27- Thoracic spine, dehisced surgical wound, full thickness, onset 8/16/2017, pressure-Dressing/Treatment: Vacuum dressing (VAC NPWT)  Wound 06/07/17 #24 Left lateral lower leg-cluster, Venous, full thickness,  (onset  6/5/17) unknown-Dressing/Treatment: Dry dressing  Wound 02/13/17 #22 left lateral foot, Mccauley 3 DFU, onset 2/2017, surgical dehiscence -Dressing/Treatment: Moist to dry, Other (Comment) (kerlix roll gauze and ace wrap toe to knee)  Wound 10/17/14  #8, right ischium, stage 4 PU, onset 7/15/14 (merged with #30), pressure-Dressing/Treatment: Dry dressing, Packing  Incision 05/10/18 Foot Left; Other (Comment)-Dressing/Treatment: Dry dressing    Home [place]a NPWT Device removed while an inpatient. I Acute care VAC NPWT applied to Thoracic spine. Sorbact to wound bed, covered with black granufoam. Drsg bridged to left flank (4Black foam pieces placed) Intermittent Suction at -150mmHg initiated, drsg collapsed with suction. Specialty Bed Required : Yes   [x] Low Air Loss   [x] Pressure Redistribution  [] Fluid Immersion  [] Bariatric  [] Total Pressure Relief  [] Other:     Current Diet: DIET CARB CONTROL; Dietician consult:  Yes    Discharge Plan:  Placement for patient upon discharge: home with support    Patient appropriate for Outpatient [address] [place] [address] Street: Yes, Current patient with Dr Drea Marley    Referrals:  []   [x] [facility]  [] Supplies  [x] Other-Discharge Planning    Patient/Caregiver Teaching:  Level of patient/caregiver understanding able to:   [x] Indicates understanding       [] Needs reinforcement  [] Unsuccessful      [x] Verbal Understanding  [] Demonstrated understanding       [] No evidence of learning  [] Refused teaching         [x] Patient is very familiar with his care and conditions  Will Continue to follow Patient.     Electronically signed by Savanah Burnett RN, [id number] on 7/23/2018 at 5:38 PM

## 2018-07-24 ENCOUNTER — HOSPITAL ENCOUNTER (INPATIENT)
Age: 53
LOS: 7 days | Discharge: HOME HEALTH | DRG: 616 | End: 2018-07-31
Attending: INTERNAL MEDICINE | Admitting: INTERNAL MEDICINE
Payer: MEDICARE

## 2018-07-24 VITALS
WEIGHT: 252.8 LBS | RESPIRATION RATE: 18 BRPM | BODY MASS INDEX: 34.24 KG/M2 | SYSTOLIC BLOOD PRESSURE: 117 MMHG | OXYGEN SATURATION: 96 % | TEMPERATURE: 98.4 F | DIASTOLIC BLOOD PRESSURE: 70 MMHG | HEIGHT: 72 IN | HEART RATE: 86 BPM

## 2018-07-24 PROBLEM — L97.529 SKIN ULCERS OF FOOT, BILATERAL (HCC): Status: ACTIVE | Noted: 2018-07-24

## 2018-07-24 PROBLEM — L97.519 SKIN ULCERS OF FOOT, BILATERAL (HCC): Status: ACTIVE | Noted: 2018-07-24

## 2018-07-24 LAB
EKG ATRIAL RATE: 87 BPM
EKG DIAGNOSIS: NORMAL
EKG P AXIS: 72 DEGREES
EKG P-R INTERVAL: 164 MS
EKG Q-T INTERVAL: 388 MS
EKG QRS DURATION: 88 MS
EKG QTC CALCULATION (BAZETT): 466 MS
EKG R AXIS: -23 DEGREES
EKG T AXIS: 49 DEGREES
EKG VENTRICULAR RATE: 87 BPM
GLUCOSE BLD-MCNC: 175 MG/DL (ref 70–99)
GLUCOSE BLD-MCNC: 229 MG/DL (ref 70–99)
GLUCOSE BLD-MCNC: 303 MG/DL (ref 70–99)
GLUCOSE BLD-MCNC: 355 MG/DL (ref 70–99)
PERFORMED ON: ABNORMAL

## 2018-07-24 PROCEDURE — 6360000002 HC RX W HCPCS: Performed by: INTERNAL MEDICINE

## 2018-07-24 PROCEDURE — 2580000003 HC RX 258: Performed by: INTERNAL MEDICINE

## 2018-07-24 PROCEDURE — 99231 SBSQ HOSP IP/OBS SF/LOW 25: CPT | Performed by: SURGERY

## 2018-07-24 PROCEDURE — 1200000000 HC SEMI PRIVATE

## 2018-07-24 PROCEDURE — 6360000002 HC RX W HCPCS: Performed by: HOSPITALIST

## 2018-07-24 PROCEDURE — 6370000000 HC RX 637 (ALT 250 FOR IP): Performed by: INTERNAL MEDICINE

## 2018-07-24 PROCEDURE — 6370000000 HC RX 637 (ALT 250 FOR IP): Performed by: HOSPITALIST

## 2018-07-24 PROCEDURE — 99239 HOSP IP/OBS DSCHRG MGMT >30: CPT | Performed by: INTERNAL MEDICINE

## 2018-07-24 RX ORDER — INSULIN GLARGINE 100 [IU]/ML
40 INJECTION, SOLUTION SUBCUTANEOUS 2 TIMES DAILY
Status: DISCONTINUED | OUTPATIENT
Start: 2018-07-24 | End: 2018-07-24 | Stop reason: HOSPADM

## 2018-07-24 RX ORDER — SODIUM CHLORIDE 0.9 % (FLUSH) 0.9 %
10 SYRINGE (ML) INJECTION PRN
Status: DISCONTINUED | OUTPATIENT
Start: 2018-07-24 | End: 2018-07-31 | Stop reason: HOSPADM

## 2018-07-24 RX ORDER — ASPIRIN 81 MG/1
81 TABLET, CHEWABLE ORAL DAILY
Status: DISCONTINUED | OUTPATIENT
Start: 2018-07-24 | End: 2018-07-31 | Stop reason: HOSPADM

## 2018-07-24 RX ORDER — DOCUSATE SODIUM 100 MG/1
100 CAPSULE, LIQUID FILLED ORAL 2 TIMES DAILY
Status: DISCONTINUED | OUTPATIENT
Start: 2018-07-24 | End: 2018-07-31 | Stop reason: HOSPADM

## 2018-07-24 RX ORDER — SENNA PLUS 8.6 MG/1
1 TABLET ORAL NIGHTLY
Status: DISCONTINUED | OUTPATIENT
Start: 2018-07-24 | End: 2018-07-31 | Stop reason: HOSPADM

## 2018-07-24 RX ORDER — ONDANSETRON 2 MG/ML
4 INJECTION INTRAMUSCULAR; INTRAVENOUS EVERY 6 HOURS PRN
Status: DISCONTINUED | OUTPATIENT
Start: 2018-07-24 | End: 2018-07-31 | Stop reason: HOSPADM

## 2018-07-24 RX ORDER — METOPROLOL SUCCINATE 100 MG/1
100 TABLET, EXTENDED RELEASE ORAL DAILY
Status: DISCONTINUED | OUTPATIENT
Start: 2018-07-24 | End: 2018-07-31 | Stop reason: HOSPADM

## 2018-07-24 RX ORDER — DEXTROSE MONOHYDRATE 25 G/50ML
12.5 INJECTION, SOLUTION INTRAVENOUS PRN
Status: DISCONTINUED | OUTPATIENT
Start: 2018-07-24 | End: 2018-07-31 | Stop reason: HOSPADM

## 2018-07-24 RX ORDER — NICOTINE POLACRILEX 4 MG
15 LOZENGE BUCCAL PRN
Status: DISCONTINUED | OUTPATIENT
Start: 2018-07-24 | End: 2018-07-31 | Stop reason: HOSPADM

## 2018-07-24 RX ORDER — DEXTROSE MONOHYDRATE 50 MG/ML
100 INJECTION, SOLUTION INTRAVENOUS PRN
Status: DISCONTINUED | OUTPATIENT
Start: 2018-07-24 | End: 2018-07-31 | Stop reason: HOSPADM

## 2018-07-24 RX ORDER — FUROSEMIDE 40 MG/1
40 TABLET ORAL 2 TIMES DAILY
Status: DISCONTINUED | OUTPATIENT
Start: 2018-07-24 | End: 2018-07-24 | Stop reason: HOSPADM

## 2018-07-24 RX ORDER — CLOPIDOGREL BISULFATE 75 MG/1
75 TABLET ORAL DAILY
Status: DISCONTINUED | OUTPATIENT
Start: 2018-07-24 | End: 2018-07-31 | Stop reason: HOSPADM

## 2018-07-24 RX ORDER — PANTOPRAZOLE SODIUM 40 MG/1
40 TABLET, DELAYED RELEASE ORAL DAILY
Status: DISCONTINUED | OUTPATIENT
Start: 2018-07-24 | End: 2018-07-31 | Stop reason: HOSPADM

## 2018-07-24 RX ORDER — FERROUS SULFATE 325(65) MG
325 TABLET ORAL
Status: DISCONTINUED | OUTPATIENT
Start: 2018-07-25 | End: 2018-07-29

## 2018-07-24 RX ORDER — PROMETHAZINE HYDROCHLORIDE 25 MG/1
25 TABLET ORAL EVERY 6 HOURS PRN
Status: DISCONTINUED | OUTPATIENT
Start: 2018-07-24 | End: 2018-07-31 | Stop reason: HOSPADM

## 2018-07-24 RX ORDER — SODIUM CHLORIDE 0.9 % (FLUSH) 0.9 %
10 SYRINGE (ML) INJECTION EVERY 12 HOURS SCHEDULED
Status: DISCONTINUED | OUTPATIENT
Start: 2018-07-24 | End: 2018-07-31 | Stop reason: HOSPADM

## 2018-07-24 RX ORDER — TRAZODONE HYDROCHLORIDE 50 MG/1
50 TABLET ORAL NIGHTLY
Status: DISCONTINUED | OUTPATIENT
Start: 2018-07-24 | End: 2018-07-31 | Stop reason: HOSPADM

## 2018-07-24 RX ORDER — TORSEMIDE 20 MG/1
40 TABLET ORAL DAILY
Status: DISCONTINUED | OUTPATIENT
Start: 2018-07-24 | End: 2018-07-31 | Stop reason: HOSPADM

## 2018-07-24 RX ORDER — OXYCODONE HYDROCHLORIDE 5 MG/1
5 TABLET ORAL EVERY 4 HOURS PRN
Status: DISCONTINUED | OUTPATIENT
Start: 2018-07-24 | End: 2018-07-31 | Stop reason: HOSPADM

## 2018-07-24 RX ORDER — CYCLOBENZAPRINE HCL 10 MG
10 TABLET ORAL 2 TIMES DAILY PRN
Status: DISCONTINUED | OUTPATIENT
Start: 2018-07-24 | End: 2018-07-31 | Stop reason: HOSPADM

## 2018-07-24 RX ORDER — FLUCONAZOLE 100 MG/1
100 TABLET ORAL DAILY
Status: DISCONTINUED | OUTPATIENT
Start: 2018-07-24 | End: 2018-07-31 | Stop reason: HOSPADM

## 2018-07-24 RX ADMIN — ENOXAPARIN SODIUM 120 MG: 120 INJECTION SUBCUTANEOUS at 08:29

## 2018-07-24 RX ADMIN — INSULIN LISPRO 1 UNITS: 100 INJECTION, SOLUTION INTRAVENOUS; SUBCUTANEOUS at 22:41

## 2018-07-24 RX ADMIN — PROMETHAZINE HYDROCHLORIDE 25 MG: 25 TABLET ORAL at 22:12

## 2018-07-24 RX ADMIN — DOCUSATE SODIUM 100 MG: 100 CAPSULE, LIQUID FILLED ORAL at 22:13

## 2018-07-24 RX ADMIN — INSULIN LISPRO 4 UNITS: 100 INJECTION, SOLUTION INTRAVENOUS; SUBCUTANEOUS at 12:05

## 2018-07-24 RX ADMIN — PANTOPRAZOLE SODIUM 40 MG: 40 TABLET, DELAYED RELEASE ORAL at 08:30

## 2018-07-24 RX ADMIN — Medication 10 ML: at 08:29

## 2018-07-24 RX ADMIN — Medication 1600 MG: at 08:29

## 2018-07-24 RX ADMIN — DOCUSATE SODIUM 200 MG: 100 CAPSULE, LIQUID FILLED ORAL at 08:30

## 2018-07-24 RX ADMIN — INSULIN GLARGINE 50 UNITS: 100 INJECTION, SOLUTION SUBCUTANEOUS at 22:40

## 2018-07-24 RX ADMIN — TORSEMIDE 40 MG: 20 TABLET ORAL at 22:13

## 2018-07-24 RX ADMIN — FLUCONAZOLE 100 MG: 100 TABLET ORAL at 22:13

## 2018-07-24 RX ADMIN — ENOXAPARIN SODIUM 120 MG: 120 INJECTION SUBCUTANEOUS at 22:41

## 2018-07-24 RX ADMIN — FLUCONAZOLE 100 MG: 100 TABLET ORAL at 08:30

## 2018-07-24 RX ADMIN — INSULIN GLARGINE 25 UNITS: 100 INJECTION, SOLUTION SUBCUTANEOUS at 08:27

## 2018-07-24 RX ADMIN — INSULIN LISPRO 2 UNITS: 100 INJECTION, SOLUTION INTRAVENOUS; SUBCUTANEOUS at 08:25

## 2018-07-24 RX ADMIN — FUROSEMIDE 40 MG: 10 INJECTION, SOLUTION INTRAMUSCULAR; INTRAVENOUS at 08:29

## 2018-07-24 RX ADMIN — SENNOSIDES 8.6 MG: 8.6 TABLET, FILM COATED ORAL at 22:12

## 2018-07-24 RX ADMIN — PANTOPRAZOLE SODIUM 40 MG: 40 TABLET, DELAYED RELEASE ORAL at 22:12

## 2018-07-24 RX ADMIN — METOPROLOL SUCCINATE 100 MG: 100 TABLET, EXTENDED RELEASE ORAL at 22:13

## 2018-07-24 RX ADMIN — TRAZODONE HYDROCHLORIDE 50 MG: 50 TABLET ORAL at 22:12

## 2018-07-24 RX ADMIN — Medication 10 ML: at 22:14

## 2018-07-24 RX ADMIN — FERROUS SULFATE TAB 325 MG (65 MG ELEMENTAL FE) 325 MG: 325 (65 FE) TAB at 08:30

## 2018-07-24 RX ADMIN — OXYCODONE HYDROCHLORIDE 5 MG: 5 TABLET ORAL at 16:31

## 2018-07-24 ASSESSMENT — PAIN DESCRIPTION - ORIENTATION: ORIENTATION: MID

## 2018-07-24 ASSESSMENT — PAIN DESCRIPTION - PROGRESSION
CLINICAL_PROGRESSION: GRADUALLY WORSENING

## 2018-07-24 ASSESSMENT — PAIN DESCRIPTION - DESCRIPTORS: DESCRIPTORS: HEADACHE

## 2018-07-24 ASSESSMENT — PAIN SCALES - GENERAL
PAINLEVEL_OUTOF10: 0
PAINLEVEL_OUTOF10: 7

## 2018-07-24 ASSESSMENT — PAIN DESCRIPTION - LOCATION: LOCATION: HEAD

## 2018-07-24 ASSESSMENT — PAIN DESCRIPTION - FREQUENCY: FREQUENCY: INTERMITTENT

## 2018-07-24 ASSESSMENT — PAIN DESCRIPTION - ONSET: ONSET: GRADUAL

## 2018-07-24 ASSESSMENT — PAIN DESCRIPTION - PAIN TYPE: TYPE: ACUTE PAIN
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
## 2018-07-24 NOTE — PROGRESS NOTES
4 Eyes Admission Assessment     I agree as the admission nurse that 2 RN's have performed a thorough Head to Toe Skin Assessment on the patient. ALL assessment sites listed below have been assessed on admission. Areas assessed by both nurses: yes  [x]   Head, Face, and Ears   [x]   Shoulders, Back, and Chest  [x]   Arms, Elbows, and Hands   [x]   Coccyx, Sacrum, and Ischum  [x]   Legs, Feet, and Heels        Does the Patient have Skin Breakdown? Yes a wound was noted on the Admission Assessment and an LDA was Initiated documentation include the [place]-wound, Wound Assessment, Measurements, Dressing Treatment, Drainage, and Color\",       Patient has multiple wounds on his BLLE, RLE (surgical wound), LLE (stage 4 on heel,stage 2 on 4 and 5 diget) buttock (stage 4), back (stage 4)and R hip(stage 4).   Ismael Prevention initiated:  Yes   Wound Care Orders initiated:  Yes      [id number]  nurse consulted for Pressure Injury (Stage 3,4, Unstageable, DTI, NWPT, and Complex wounds):  Yes      Nurse 1 eSignature: Electronically signed by Travis Cheng RN on 7/24/18 at 6:03 PM    **SHARE this note so that the co-signing nurse is able to place an eSignature**    Nurse 2 eSignature: {Esignature:[id number]}

## 2018-07-25 LAB
ANION GAP SERPL CALCULATED.3IONS-SCNC: 14 MMOL/L (ref 3–16)
BASOPHILS ABSOLUTE: 0.1 K/UL (ref 0–0.2)
BASOPHILS RELATIVE PERCENT: 0.7 %
BUN BLDV-MCNC: 40 MG/DL (ref 7–20)
CALCIUM SERPL-MCNC: 9.2 MG/DL (ref 8.3–10.6)
CHLORIDE BLD-SCNC: 93 MMOL/L (ref 99–110)
CO2: 28 MMOL/L (ref 21–32)
CREAT SERPL-MCNC: 1.1 MG/DL (ref 0.9–1.3)
EOSINOPHILS ABSOLUTE: 0.2 K/UL (ref 0–0.6)
EOSINOPHILS RELATIVE PERCENT: 1.3 %
GFR AFRICAN AMERICAN: >60
GFR NON-AFRICAN AMERICAN: >60
GLUCOSE BLD-MCNC: 110 MG/DL (ref 70–99)
GLUCOSE BLD-MCNC: 121 MG/DL (ref 70–99)
GLUCOSE BLD-MCNC: 144 MG/DL (ref 70–99)
GLUCOSE BLD-MCNC: 158 MG/DL (ref 70–99)
HCT VFR BLD CALC: 29 % (ref 40.5–52.5)
HEMOGLOBIN: 8.7 G/DL (ref 13.5–17.5)
LYMPHOCYTES ABSOLUTE: 1.3 K/UL (ref 1–5.1)
LYMPHOCYTES RELATIVE PERCENT: 9.5 %
MCH RBC QN AUTO: 22.4 PG (ref 26–34)
MCHC RBC AUTO-ENTMCNC: 30 G/DL (ref 31–36)
MCV RBC AUTO: 74.7 FL (ref 80–100)
MONOCYTES ABSOLUTE: 1.2 K/UL (ref 0–1.3)
MONOCYTES RELATIVE PERCENT: 9 %
NEUTROPHILS ABSOLUTE: 10.8 K/UL (ref 1.7–7.7)
NEUTROPHILS RELATIVE PERCENT: 79.5 %
PDW BLD-RTO: 22.9 % (ref 12.4–15.4)
PERFORMED ON: ABNORMAL
PLATELET # BLD: 420 K/UL (ref 135–450)
PMV BLD AUTO: 8.3 FL (ref 5–10.5)
POTASSIUM REFLEX MAGNESIUM: 3.7 MMOL/L (ref 3.5–5.1)
RBC # BLD: 3.88 M/UL (ref 4.2–5.9)
SODIUM BLD-SCNC: 135 MMOL/L (ref 136–145)
WBC # BLD: 13.5 K/UL (ref 4–11)

## 2018-07-25 PROCEDURE — 80048 BASIC METABOLIC PNL TOTAL CA: CPT

## 2018-07-25 PROCEDURE — 83036 HEMOGLOBIN GLYCOSYLATED A1C: CPT

## 2018-07-25 PROCEDURE — 99232 SBSQ HOSP IP/OBS MODERATE 35: CPT | Performed by: SURGERY

## 2018-07-25 PROCEDURE — 1200000000 HC SEMI PRIVATE

## 2018-07-25 PROCEDURE — 93005 ELECTROCARDIOGRAM TRACING: CPT | Performed by: INTERNAL MEDICINE

## 2018-07-25 PROCEDURE — 2580000003 HC RX 258: Performed by: INTERNAL MEDICINE

## 2018-07-25 PROCEDURE — 85025 COMPLETE CBC W/AUTO DIFF WBC: CPT

## 2018-07-25 PROCEDURE — 6360000002 HC RX W HCPCS: Performed by: INTERNAL MEDICINE

## 2018-07-25 PROCEDURE — 6370000000 HC RX 637 (ALT 250 FOR IP): Performed by: INTERNAL MEDICINE

## 2018-07-25 RX ADMIN — DOCUSATE SODIUM 100 MG: 100 CAPSULE, LIQUID FILLED ORAL at 10:12

## 2018-07-25 RX ADMIN — METOPROLOL SUCCINATE 100 MG: 100 TABLET, EXTENDED RELEASE ORAL at 10:13

## 2018-07-25 RX ADMIN — TORSEMIDE 40 MG: 20 TABLET ORAL at 10:11

## 2018-07-25 RX ADMIN — SENNOSIDES 8.6 MG: 8.6 TABLET, FILM COATED ORAL at 21:33

## 2018-07-25 RX ADMIN — ENOXAPARIN SODIUM 120 MG: 120 INJECTION SUBCUTANEOUS at 10:10

## 2018-07-25 RX ADMIN — FLUCONAZOLE 100 MG: 100 TABLET ORAL at 10:12

## 2018-07-25 RX ADMIN — PANTOPRAZOLE SODIUM 40 MG: 40 TABLET, DELAYED RELEASE ORAL at 10:12

## 2018-07-25 RX ADMIN — DOCUSATE SODIUM 100 MG: 100 CAPSULE, LIQUID FILLED ORAL at 21:33

## 2018-07-25 RX ADMIN — TRAZODONE HYDROCHLORIDE 50 MG: 50 TABLET ORAL at 21:33

## 2018-07-25 RX ADMIN — PROMETHAZINE HYDROCHLORIDE 25 MG: 25 TABLET ORAL at 21:37

## 2018-07-25 RX ADMIN — FERROUS SULFATE TAB 325 MG (65 MG ELEMENTAL FE) 325 MG: 325 (65 FE) TAB at 10:12

## 2018-07-25 RX ADMIN — Medication 10 ML: at 13:15

## 2018-07-25 RX ADMIN — INSULIN GLARGINE 50 UNITS: 100 INJECTION, SOLUTION SUBCUTANEOUS at 10:17

## 2018-07-25 RX ADMIN — ASPIRIN 81 MG 81 MG: 81 TABLET ORAL at 10:12

## 2018-07-25 RX ADMIN — CLOPIDOGREL BISULFATE 75 MG: 75 TABLET ORAL at 10:12

## 2018-07-25 ASSESSMENT — PAIN DESCRIPTION - PROGRESSION: CLINICAL_PROGRESSION: GRADUALLY WORSENING

## 2018-07-25 ASSESSMENT — PAIN SCALES - GENERAL
PAINLEVEL_OUTOF10: 0
PAINLEVEL_OUTOF10: 0

## 2018-07-25 NOTE — PROGRESS NOTES
Osei Alanis RN stating Podiatry caring for pt. Leg and foot wound needs and surgery caring for back,hip and ischial wound needs, no other needs currently.
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
## 2018-07-25 NOTE — PROGRESS NOTES
Clinical Pharmacy Progress Note    Admit date: 7/24/2018     Notified by RN that patient is using a continuous infusion pump containing Zosyn for chronic osteomyelitis. Per Dr. Warren Kidney on admission yesterday & RN conversation with Dr. Hugo Velasco this AM, okay for pt to continue using home infusion pump for Zosyn during this admission. Spoke with patient and reviewed pump / Zosyn bag for dosing being used. Bag contains Zosyn 13.5g in NS 300mL - bag changed every 24 hrs. Infusion through PICC line. Orders entered as follows to match home infusion:  Pt using continuous Zosyn infusion pump from home   Pump delivers 4.5g over 4 hrs every 8 hrs automatically   New infusion bag placed in cassette every 24 hrs by pt / wife   Pump to be managed by pt / wife     Pt understanding of above plan, and states wife can bring in daily Zosyn for the pump. Pt does state that Thursday 7/26 was planned to be the last day of Zosyn infusion - verified with notes from [facility] physician (Dr. Eden Peter) at [facility]. If therapy is continued past 7/26, will need to transition back to Zosyn administered per our usual protocol by nursing (4.5g IV over 4 hrs every 8 hrs).     Will monitor -    Please call with questions--  Thanks--  Tammy Gallagher, PharmD, [address] [facility], [facility] or [phone] (wireless)  7/25/2018 11:18 AM
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
## 2018-07-26 ENCOUNTER — ANESTHESIA (OUTPATIENT)
Dept: OPERATING ROOM | Age: 53
DRG: 616 | End: 2018-07-26
Payer: MEDICARE

## 2018-07-26 ENCOUNTER — ANESTHESIA EVENT (OUTPATIENT)
Dept: OPERATING ROOM | Age: 53
DRG: 616 | End: 2018-07-26
Payer: MEDICARE

## 2018-07-26 ENCOUNTER — APPOINTMENT (OUTPATIENT)
Dept: GENERAL RADIOLOGY | Age: 53
DRG: 616 | End: 2018-07-26
Attending: INTERNAL MEDICINE
Payer: MEDICARE

## 2018-07-26 VITALS — SYSTOLIC BLOOD PRESSURE: 128 MMHG | OXYGEN SATURATION: 92 % | DIASTOLIC BLOOD PRESSURE: 75 MMHG

## 2018-07-26 LAB
ABO/RH: NORMAL
ANAEROBIC CULTURE: NORMAL
ANTIBODY SCREEN: NORMAL
ESTIMATED AVERAGE GLUCOSE: 137 MG/DL
GLUCOSE BLD-MCNC: 134 MG/DL (ref 70–99)
GLUCOSE BLD-MCNC: 148 MG/DL (ref 70–99)
GLUCOSE BLD-MCNC: 162 MG/DL (ref 70–99)
GLUCOSE BLD-MCNC: 186 MG/DL (ref 70–99)
HBA1C MFR BLD: 6.4 %
PERFORMED ON: ABNORMAL
WOUND/ABSCESS: NORMAL

## 2018-07-26 PROCEDURE — 3700000000 HC ANESTHESIA ATTENDED CARE: Performed by: PODIATRIST

## 2018-07-26 PROCEDURE — 87205 SMEAR GRAM STAIN: CPT

## 2018-07-26 PROCEDURE — 0Y6M0Z7 DETACHMENT AT RIGHT FOOT, COMPLETE 4TH RAY, OPEN APPROACH: ICD-10-PCS | Performed by: PODIATRIST

## 2018-07-26 PROCEDURE — 86850 RBC ANTIBODY SCREEN: CPT

## 2018-07-26 PROCEDURE — 73630 X-RAY EXAM OF FOOT: CPT

## 2018-07-26 PROCEDURE — 3700000001 HC ADD 15 MINUTES (ANESTHESIA): Performed by: PODIATRIST

## 2018-07-26 PROCEDURE — 1200000000 HC SEMI PRIVATE

## 2018-07-26 PROCEDURE — 3600000003 HC SURGERY LEVEL 3 BASE: Performed by: PODIATRIST

## 2018-07-26 PROCEDURE — 0QTL0ZZ RESECTION OF RIGHT TARSAL, OPEN APPROACH: ICD-10-PCS | Performed by: PODIATRIST

## 2018-07-26 PROCEDURE — 0Y6M0Z8 DETACHMENT AT RIGHT FOOT, COMPLETE 5TH RAY, OPEN APPROACH: ICD-10-PCS | Performed by: PODIATRIST

## 2018-07-26 PROCEDURE — 87102 FUNGUS ISOLATION CULTURE: CPT

## 2018-07-26 PROCEDURE — 87206 SMEAR FLUORESCENT/ACID STAI: CPT

## 2018-07-26 PROCEDURE — 87070 CULTURE OTHR SPECIMN AEROBIC: CPT

## 2018-07-26 PROCEDURE — 88311 DECALCIFY TISSUE: CPT

## 2018-07-26 PROCEDURE — 88304 TISSUE EXAM BY PATHOLOGIST: CPT

## 2018-07-26 PROCEDURE — 86900 BLOOD TYPING SEROLOGIC ABO: CPT

## 2018-07-26 PROCEDURE — 87186 SC STD MICRODIL/AGAR DIL: CPT

## 2018-07-26 PROCEDURE — 2709999900 HC NON-CHARGEABLE SUPPLY: Performed by: PODIATRIST

## 2018-07-26 PROCEDURE — 7100000000 HC PACU RECOVERY - FIRST 15 MIN: Performed by: PODIATRIST

## 2018-07-26 PROCEDURE — 0Y6M0Z6 DETACHMENT AT RIGHT FOOT, COMPLETE 3RD RAY, OPEN APPROACH: ICD-10-PCS | Performed by: PODIATRIST

## 2018-07-26 PROCEDURE — 87077 CULTURE AEROBIC IDENTIFY: CPT

## 2018-07-26 PROCEDURE — 3600000013 HC SURGERY LEVEL 3 ADDTL 15MIN: Performed by: PODIATRIST

## 2018-07-26 PROCEDURE — 87116 MYCOBACTERIA CULTURE: CPT

## 2018-07-26 PROCEDURE — 2580000003 HC RX 258: Performed by: NURSE ANESTHETIST, CERTIFIED REGISTERED

## 2018-07-26 PROCEDURE — 2580000003 HC RX 258: Performed by: INTERNAL MEDICINE

## 2018-07-26 PROCEDURE — 6360000002 HC RX W HCPCS: Performed by: NURSE ANESTHETIST, CERTIFIED REGISTERED

## 2018-07-26 PROCEDURE — 86901 BLOOD TYPING SEROLOGIC RH(D): CPT

## 2018-07-26 PROCEDURE — 87015 SPECIMEN INFECT AGNT CONCNTJ: CPT

## 2018-07-26 PROCEDURE — 6370000000 HC RX 637 (ALT 250 FOR IP): Performed by: INTERNAL MEDICINE

## 2018-07-26 PROCEDURE — 7100000001 HC PACU RECOVERY - ADDTL 15 MIN: Performed by: PODIATRIST

## 2018-07-26 PROCEDURE — 87075 CULTR BACTERIA EXCEPT BLOOD: CPT

## 2018-07-26 RX ORDER — LABETALOL HYDROCHLORIDE 5 MG/ML
5 INJECTION, SOLUTION INTRAVENOUS EVERY 10 MIN PRN
Status: DISCONTINUED | OUTPATIENT
Start: 2018-07-26 | End: 2018-07-26 | Stop reason: HOSPADM

## 2018-07-26 RX ORDER — FENTANYL CITRATE 50 UG/ML
25 INJECTION, SOLUTION INTRAMUSCULAR; INTRAVENOUS EVERY 5 MIN PRN
Status: DISCONTINUED | OUTPATIENT
Start: 2018-07-26 | End: 2018-07-26 | Stop reason: HOSPADM

## 2018-07-26 RX ORDER — ENALAPRILAT 2.5 MG/2ML
1.25 INJECTION INTRAVENOUS
Status: DISCONTINUED | OUTPATIENT
Start: 2018-07-26 | End: 2018-07-26 | Stop reason: HOSPADM

## 2018-07-26 RX ORDER — ONDANSETRON 2 MG/ML
4 INJECTION INTRAMUSCULAR; INTRAVENOUS
Status: DISCONTINUED | OUTPATIENT
Start: 2018-07-26 | End: 2018-07-26 | Stop reason: HOSPADM

## 2018-07-26 RX ORDER — MORPHINE SULFATE 2 MG/ML
2 INJECTION, SOLUTION INTRAMUSCULAR; INTRAVENOUS EVERY 5 MIN PRN
Status: DISCONTINUED | OUTPATIENT
Start: 2018-07-26 | End: 2018-07-26 | Stop reason: HOSPADM

## 2018-07-26 RX ORDER — MIDAZOLAM HYDROCHLORIDE 1 MG/ML
INJECTION INTRAMUSCULAR; INTRAVENOUS PRN
Status: DISCONTINUED | OUTPATIENT
Start: 2018-07-26 | End: 2018-07-26 | Stop reason: SDUPTHER

## 2018-07-26 RX ORDER — HYDRALAZINE HYDROCHLORIDE 20 MG/ML
5 INJECTION INTRAMUSCULAR; INTRAVENOUS EVERY 5 MIN PRN
Status: DISCONTINUED | OUTPATIENT
Start: 2018-07-26 | End: 2018-07-26 | Stop reason: HOSPADM

## 2018-07-26 RX ORDER — SODIUM CHLORIDE, SODIUM LACTATE, POTASSIUM CHLORIDE, CALCIUM CHLORIDE 600; 310; 30; 20 MG/100ML; MG/100ML; MG/100ML; MG/100ML
INJECTION, SOLUTION INTRAVENOUS CONTINUOUS PRN
Status: DISCONTINUED | OUTPATIENT
Start: 2018-07-26 | End: 2018-07-26 | Stop reason: SDUPTHER

## 2018-07-26 RX ORDER — PROPOFOL 10 MG/ML
INJECTION, EMULSION INTRAVENOUS PRN
Status: DISCONTINUED | OUTPATIENT
Start: 2018-07-26 | End: 2018-07-26 | Stop reason: SDUPTHER

## 2018-07-26 RX ADMIN — TRAZODONE HYDROCHLORIDE 50 MG: 50 TABLET ORAL at 21:34

## 2018-07-26 RX ADMIN — PROPOFOL 20 MG: 10 INJECTION, EMULSION INTRAVENOUS at 15:58

## 2018-07-26 RX ADMIN — PROPOFOL 20 MG: 10 INJECTION, EMULSION INTRAVENOUS at 16:03

## 2018-07-26 RX ADMIN — PROPOFOL 20 MG: 10 INJECTION, EMULSION INTRAVENOUS at 16:20

## 2018-07-26 RX ADMIN — PROPOFOL 20 MG: 10 INJECTION, EMULSION INTRAVENOUS at 16:30

## 2018-07-26 RX ADMIN — SODIUM CHLORIDE, SODIUM LACTATE, POTASSIUM CHLORIDE, AND CALCIUM CHLORIDE: 600; 310; 30; 20 INJECTION, SOLUTION INTRAVENOUS at 15:25

## 2018-07-26 RX ADMIN — PROPOFOL 10 MG: 10 INJECTION, EMULSION INTRAVENOUS at 15:42

## 2018-07-26 RX ADMIN — TORSEMIDE 40 MG: 20 TABLET ORAL at 10:13

## 2018-07-26 RX ADMIN — Medication 10 ML: at 10:14

## 2018-07-26 RX ADMIN — PROPOFOL 10 MG: 10 INJECTION, EMULSION INTRAVENOUS at 15:53

## 2018-07-26 RX ADMIN — SENNOSIDES 8.6 MG: 8.6 TABLET, FILM COATED ORAL at 21:34

## 2018-07-26 RX ADMIN — DOCUSATE SODIUM 100 MG: 100 CAPSULE, LIQUID FILLED ORAL at 21:34

## 2018-07-26 RX ADMIN — MIDAZOLAM HYDROCHLORIDE 2 MG: 1 INJECTION INTRAMUSCULAR; INTRAVENOUS at 15:36

## 2018-07-26 RX ADMIN — FLUCONAZOLE 100 MG: 100 TABLET ORAL at 10:14

## 2018-07-26 RX ADMIN — PROPOFOL 20 MG: 10 INJECTION, EMULSION INTRAVENOUS at 16:10

## 2018-07-26 RX ADMIN — PROPOFOL 10 MG: 10 INJECTION, EMULSION INTRAVENOUS at 15:48

## 2018-07-26 RX ADMIN — Medication 10 ML: at 21:35

## 2018-07-26 ASSESSMENT — PULMONARY FUNCTION TESTS
PIF_VALUE: 0
PIF_VALUE: 2
PIF_VALUE: 0
PIF_VALUE: 1
PIF_VALUE: 0
PIF_VALUE: 1
PIF_VALUE: 0
PIF_VALUE: 1
PIF_VALUE: 0
PIF_VALUE: 1
PIF_VALUE: 0
PIF_VALUE: 1
PIF_VALUE: 0
PIF_VALUE: 1
PIF_VALUE: 0

## 2018-07-26 ASSESSMENT — PAIN SCALES - GENERAL: PAINLEVEL_OUTOF10: 0

## 2018-07-26 ASSESSMENT — ENCOUNTER SYMPTOMS: SHORTNESS OF BREATH: 1
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
## 2018-07-26 NOTE — CARE COORDINATION
SHYANN spoke with [facility] who is following pt for home care. SHYANN is aware that pt is having OR today per notes from podiatry. SW continue to follow pts progress.     Manoj Caller CHARLIE   [phone]

## 2018-07-26 NOTE — PROGRESS NOTES
and 5th ray amputations today, 7/26/18    Sacral Decubitus Ulcer 2/2 Bed ridden   - wound vac placement for back wound  - wet to dry dressing sacral wound   - iodorm packing for ischial wound  - Management per surgery    DM type 2 uncontrolled:  Blood sugar monitoring, Hba1c   Lantus 50 U BID, Lisipro sliding scale  Lantus held this AM before surgery, restart once hes back     Heart Failure with CAD and 5 vessel CABG  - Consult Cardiology for evaluation   - Plavix 75mg  - ASA 81    Recurrent Hx of DVT:  Lovenox 120 mg     Acute on chronic CHF with EF 35%: stable  torsemide oral 40 mg  Metoprolol 100 mg xl  aspirin 81 mg      DVT Prophylaxis: Lovenox  Diet: Diet NPO, After Midnight  Code Status: Full Code    Jorge Garcia MD    I will discuss the patient with the senior resident and MD Jorge Sethi MD  Internal Medicine Resident PGY- 1  Pager: [phone]               Addendum to AI (serving as scribe)/Resident Note:   Patient seen and evaluated with the medical resident. I was physically present during the critical portions of the service when performed by the resident including the assessment and management of the patient.      I agree with findings, Assessment and plan documented as above except for below     A/p  1. Foot infection  2. Ischemic cardiomyopathy compensated. 3. CAD  4. DM type 2 uncontrolled  5. Hx of DVT on AC with lovenox while in hospital.      Plan  Continue IV abx, Podiatry and general surgery consulted for wound care. Cardiology consultation for pre op risk stratification. He has 5 stents in . Holding insulin now as patient NPO for procedure. Continue Lovenox for history of DVT.   Plan was to transition to Coumadin per primary care practice  Continue home medication for chronic medical issues.                Christian Castro MD

## 2018-07-26 NOTE — ANESTHESIA POSTPROCEDURE EVALUATION
Department of Anesthesiology  Postprocedure Note    Patient: Waldemar Contreras  MRN: [id number]  YOB: 1965  Date of evaluation: 7/26/2018  Time:  4:51 PM     Procedure Summary     Date:  07/26/18 Room / Location:  [facility] OR 13 / [facility] OR    Anesthesia Start:  4763 Anesthesia Stop:  2937    Procedure:  Amputation third and forth ray, fifth toe, debridement of multiple compartments including bone with removal of cuboid and lateral cuneiform, bone biopsy of cuboid and base of third ray (Right ) Diagnosis:  (OSTEOMYELITIS RIGHT FOOT/ANKLE, OSTEOMYELITIS LEFT FOOT/ANKLE, STAGE 3 PRESSURE LUCER LEFT HEEL, CHRONIC ULCER HEEL AND MIDFOOT W/ FAT LAYER EXPOSED RIGHT)    Surgeon:  Nimo Doty DPM Responsible Provider:  Jason Carlson MD    Anesthesia Type:  general ASA Status:  3          Anesthesia Type: general    Cleve Phase I:      Cleve Phase II:      Last vitals: Reviewed and per EMR flowsheets.        Anesthesia Post Evaluation    Patient location during evaluation: bedside  Patient participation: complete - patient participated  Level of consciousness: awake  Airway patency: patent  Complications: no  Cardiovascular status: blood pressure returned to baseline  Respiratory status: acceptable  Hydration status: euvolemic

## 2018-07-26 NOTE — PLAN OF CARE
Problem: Falls - Risk of:  Goal: Will remain free from falls  Will remain free from falls   Outcome: Ongoing  Patient at risk for falls. Patient resting quietly in bed. Side rails up x 2. Bed locked in lowest position. Bed alarm on. Bedside table and call light within reach. Patient instructed to call for assistance. Patient verbalized understanding. Will continue to monitor. Problem: Risk for Impaired Skin Integrity  Goal: Tissue integrity - skin and mucous membranes  Structural intactness and normal physiological function of skin and  mucous membranes. Outcome: Ongoing  Pt at high risk for skin breakdown. Skin assessment complete. No signs of new skin breakdown. Preexisting skin issues assessed and documented on. Pt with catheter and ostomy in place. Pt repositioned in bed with pillow support, specialty bed in use. Will continue to monitor.
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
## 2018-07-26 NOTE — ANESTHESIA PRE PROCEDURE
(HUMALOG) injection pen 0-6 Units  0-6 Units Subcutaneous Nightly Patel Ospina MD        piperacillin-tazobactam 13.5 g in sodium chloride 0.9 % 300 mL infusion - PT SUPPLIED PUMP   Intravenous Continuous Leighann Blake MD        aspirin chewable tablet 81 mg  81 mg Oral Daily Davin Edinson Christine MD   81 mg at 07/25/18 1012    clopidogrel (PLAVIX) tablet 75 mg  75 mg Oral Daily Davin Edinson Christine MD   75 mg at 07/25/18 1012    cyclobenzaprine (FLEXERIL) tablet 10 mg  10 mg Oral BID PRN Davin Christine MD        docusate sodium (COLACE) capsule 100 mg  100 mg Oral BID Davin Ortiz MD   100 mg at 07/25/18 2133    ferrous sulfate tablet 325 mg  325 mg Oral Daily with breakfast Davin Ortiz MD   325 mg at 07/25/18 1012    metoprolol succinate (TOPROL XL) extended release tablet 100 mg  100 mg Oral Daily Davin Ortiz MD   100 mg at 07/25/18 1013    oxyCODONE (ROXICODONE) immediate release tablet 5 mg  5 mg Oral Q4H PRN Davin Christine MD        pantoprazole (PROTONIX) tablet 40 mg  40 mg Oral Daily Davin Ortiz MD   40 mg at 07/25/18 1012    promethazine (PHENERGAN) tablet 25 mg  25 mg Oral Q6H PRN Davin Ortiz MD   25 mg at 07/25/18 2137    senna (SENOKOT) tablet 8.6 mg  1 tablet Oral Nightly Davin Edinson Christine MD   8.6 mg at 07/25/18 2133    torsemide (DEMADEX) tablet 40 mg  40 mg Oral Daily Davin Ortiz MD   40 mg at 07/26/18 1013    traZODone (DESYREL) tablet 50 mg  50 mg Oral Nightly Davin Ortiz MD   50 mg at 07/25/18 2133    sodium chloride flush 0.9 % injection 10 mL  10 mL Intravenous 2 times per day Davin Ortiz MD   10 mL at 07/26/18 1014    sodium chloride flush 0.9 % injection 10 mL  10 mL Intravenous PRN Davin Maria Isabel Ortiz MD        magnesium hydroxide (MILK OF MAGNESIA) 400 MG/5ML suspension 30 mL  30 mL Oral Daily PRN Davin Christine MD        ondansetron (ZOFRAN) injection 4 mg  4 mg Intravenous Q6H PRN Davin Neves

## 2018-07-26 NOTE — BRIEF OP NOTE
Canister changed? Yes 7/26/2018  8:10 AM   Dressing Status Clean;Dry; Intact 7/26/2018  8:10 AM   Dressing Changed Changed/New 7/26/2018  4:00 AM   Drainage Amount Small 7/26/2018  8:10 AM   Drainage Description Serosanguinous 7/26/2018  8:10 AM   Dressing Change Due 07/25/18 7/26/2018  4:00 AM   Wound Assessment Red; Other (Comment) 7/26/2018  8:10 AM   Chetna-wound Assessment Intact; Pink 7/26/2018  8:10 AM   Odor None 7/26/2018  8:10 AM       Negative Pressure Wound Therapy Knee Right (Active)       Colostomy LLQ (Active)   Stomal Appliance 1 piece;Clean;Dry; Intact 7/24/2018  6:13 PM   Stoma  Assessment ADAM; Other (Comment) 7/26/2018  8:10 AM   Stool Appearance Soft; Formed 7/25/2018  4:21 PM   Stool Color Bobby Loll 7/26/2018  8:10 AM   Stool Amount Medium 7/26/2018  8:10 AM   Output (mL) 450 ml 7/25/2018 10:52 PM       Colostomy LLQ (Active)       Urethral Catheter 16 fr (Active)   Catheter Indications Need for fluid management in critically ill patients in a critical care setting not able to be managed by other means such as BSC with hat, bedpan, urinal, condom catheter, or short term intermittent urethral catherization 7/26/2018  8:10 AM   Site Assessment Pink 7/25/2018  4:21 PM   Urine Color Yellow 7/26/2018 12:18 PM   Urine Appearance Clear 7/26/2018 12:18 PM   Output (mL) 1650 mL 7/26/2018 12:18 PM       Findings: Upon removal of the dressing the wound was noted to be infected with purulent drainage and exposed bone. The 4th and 5th digits were amputated along with resection of the 3rd and 4th metatarsals. The cuboid and lateral cuneiform were also resected with no more purulent drainage encountered. A wound culture was taken and the multiple bone biopsies were sent for further review. Hemostasis was obtained before dressing. The wound is opened and packed with Iodoform. Patient will return to OR for delayed primary closure within 3-4 days.   Patient will continue IV antibiotics and a wound vac will be applied tomorrow morning.      Sibyl Ganser, DPM  Date: 7/26/2018  Time: 4:40 PM

## 2018-07-26 NOTE — PROGRESS NOTES
Chart review of pt. Reveals Multiple stage 4 pressure injuries and surgical wounds with paraplegia indicative of need for Dolphin Full Immersion mattress for complete pressure relief of entire body. Call to Mercedes Jenkins charge nurse and infomred of need, Mercedes Jenkins stating to call and exchange mattress .

## 2018-07-27 PROBLEM — M86.471 CHRONIC OSTEOMYELITIS OF RIGHT FOOT WITH DRAINING SINUS (HCC): Status: ACTIVE | Noted: 2018-07-27

## 2018-07-27 LAB
ANAEROBIC CULTURE: NORMAL
BASOPHILS ABSOLUTE: 0.1 K/UL (ref 0–0.2)
BASOPHILS RELATIVE PERCENT: 0.8 %
CULTURE SURGICAL: NORMAL
EOSINOPHILS ABSOLUTE: 0.1 K/UL (ref 0–0.6)
EOSINOPHILS RELATIVE PERCENT: 0.7 %
GLUCOSE BLD-MCNC: 261 MG/DL (ref 70–99)
GLUCOSE BLD-MCNC: 376 MG/DL (ref 70–99)
GLUCOSE BLD-MCNC: 406 MG/DL (ref 70–99)
GLUCOSE BLD-MCNC: 414 MG/DL (ref 70–99)
GLUCOSE BLD-MCNC: 435 MG/DL (ref 70–99)
HCT VFR BLD CALC: 30.2 % (ref 40.5–52.5)
HEMOGLOBIN: 9.1 G/DL (ref 13.5–17.5)
LYMPHOCYTES ABSOLUTE: 0.8 K/UL (ref 1–5.1)
LYMPHOCYTES RELATIVE PERCENT: 6.2 %
MCH RBC QN AUTO: 22.2 PG (ref 26–34)
MCHC RBC AUTO-ENTMCNC: 30.2 G/DL (ref 31–36)
MCV RBC AUTO: 73.7 FL (ref 80–100)
MONOCYTES ABSOLUTE: 1.2 K/UL (ref 0–1.3)
MONOCYTES RELATIVE PERCENT: 9 %
NEUTROPHILS ABSOLUTE: 11 K/UL (ref 1.7–7.7)
NEUTROPHILS RELATIVE PERCENT: 83.3 %
PDW BLD-RTO: 23.4 % (ref 12.4–15.4)
PERFORMED ON: ABNORMAL
PLATELET # BLD: 317 K/UL (ref 135–450)
PMV BLD AUTO: 8.5 FL (ref 5–10.5)
RBC # BLD: 4.1 M/UL (ref 4.2–5.9)
WBC # BLD: 13.2 K/UL (ref 4–11)

## 2018-07-27 PROCEDURE — 6360000002 HC RX W HCPCS: Performed by: STUDENT IN AN ORGANIZED HEALTH CARE EDUCATION/TRAINING PROGRAM

## 2018-07-27 PROCEDURE — 99223 1ST HOSP IP/OBS HIGH 75: CPT | Performed by: INTERNAL MEDICINE

## 2018-07-27 PROCEDURE — 6370000000 HC RX 637 (ALT 250 FOR IP): Performed by: STUDENT IN AN ORGANIZED HEALTH CARE EDUCATION/TRAINING PROGRAM

## 2018-07-27 PROCEDURE — 2580000003 HC RX 258: Performed by: INTERNAL MEDICINE

## 2018-07-27 PROCEDURE — 85025 COMPLETE CBC W/AUTO DIFF WBC: CPT

## 2018-07-27 PROCEDURE — 6370000000 HC RX 637 (ALT 250 FOR IP): Performed by: INTERNAL MEDICINE

## 2018-07-27 PROCEDURE — 1200000000 HC SEMI PRIVATE

## 2018-07-27 PROCEDURE — 2580000003 HC RX 258: Performed by: STUDENT IN AN ORGANIZED HEALTH CARE EDUCATION/TRAINING PROGRAM

## 2018-07-27 PROCEDURE — 93010 ELECTROCARDIOGRAM REPORT: CPT | Performed by: INTERNAL MEDICINE

## 2018-07-27 RX ADMIN — DOCUSATE SODIUM 100 MG: 100 CAPSULE, LIQUID FILLED ORAL at 22:21

## 2018-07-27 RX ADMIN — ENOXAPARIN SODIUM 120 MG: 120 INJECTION SUBCUTANEOUS at 22:21

## 2018-07-27 RX ADMIN — ENOXAPARIN SODIUM 80 MG: 80 INJECTION SUBCUTANEOUS at 12:40

## 2018-07-27 RX ADMIN — Medication 10 ML: at 10:49

## 2018-07-27 RX ADMIN — INSULIN GLARGINE 50 UNITS: 100 INJECTION, SOLUTION SUBCUTANEOUS at 22:18

## 2018-07-27 RX ADMIN — ENOXAPARIN SODIUM 40 MG: 40 INJECTION SUBCUTANEOUS at 10:46

## 2018-07-27 RX ADMIN — FERROUS SULFATE TAB 325 MG (65 MG ELEMENTAL FE) 325 MG: 325 (65 FE) TAB at 10:46

## 2018-07-27 RX ADMIN — FLUCONAZOLE 100 MG: 100 TABLET ORAL at 10:46

## 2018-07-27 RX ADMIN — METOPROLOL SUCCINATE 100 MG: 100 TABLET, EXTENDED RELEASE ORAL at 10:56

## 2018-07-27 RX ADMIN — INSULIN LISPRO 6 UNITS: 100 INJECTION, SOLUTION INTRAVENOUS; SUBCUTANEOUS at 22:19

## 2018-07-27 RX ADMIN — TORSEMIDE 40 MG: 20 TABLET ORAL at 10:47

## 2018-07-27 RX ADMIN — SENNOSIDES 8.6 MG: 8.6 TABLET, FILM COATED ORAL at 22:21

## 2018-07-27 RX ADMIN — PIPERACILLIN SODIUM,TAZOBACTAM SODIUM 4.5 G: 4; .5 INJECTION, POWDER, FOR SOLUTION INTRAVENOUS at 18:56

## 2018-07-27 RX ADMIN — DOCUSATE SODIUM 100 MG: 100 CAPSULE, LIQUID FILLED ORAL at 10:47

## 2018-07-27 RX ADMIN — Medication 10 ML: at 22:33

## 2018-07-27 RX ADMIN — INSULIN GLARGINE 50 UNITS: 100 INJECTION, SOLUTION SUBCUTANEOUS at 10:47

## 2018-07-27 RX ADMIN — TRAZODONE HYDROCHLORIDE 50 MG: 50 TABLET ORAL at 22:21

## 2018-07-27 RX ADMIN — INSULIN LISPRO 6 UNITS: 100 INJECTION, SOLUTION INTRAVENOUS; SUBCUTANEOUS at 10:48

## 2018-07-27 RX ADMIN — PIPERACILLIN SODIUM,TAZOBACTAM SODIUM 4.5 G: 4; .5 INJECTION, POWDER, FOR SOLUTION INTRAVENOUS at 12:40

## 2018-07-27 RX ADMIN — INSULIN LISPRO 12 UNITS: 100 INJECTION, SOLUTION INTRAVENOUS; SUBCUTANEOUS at 12:41

## 2018-07-27 RX ADMIN — INSULIN LISPRO 10 UNITS: 100 INJECTION, SOLUTION INTRAVENOUS; SUBCUTANEOUS at 19:00

## 2018-07-27 RX ADMIN — PANTOPRAZOLE SODIUM 40 MG: 40 TABLET, DELAYED RELEASE ORAL at 10:46

## 2018-07-27 ASSESSMENT — PAIN SCALES - GENERAL
PAINLEVEL_OUTOF10: 0
PAINLEVEL_OUTOF10: 0

## 2018-07-27 NOTE — PROGRESS NOTES
Podiatric Surgery Daily Progress Note  Faviola Mendez  Subjective :   Patient seen and examined this am at the bedside. Patient denies any acute overnight events. Patient denies N/V/F/C/SOB. Patient denies calf pain, thigh pain, chest pain. Review of Systems: A 12 point review of symptoms is unremarkable with the exception of the chief complaint. Patient specifically denies nausea, fever, vomiting, chills, shortness of breath, chest pain, abdominal pain, constipation or difficulty urinating. Objective     /68   Pulse 80   Temp 98 °F (36.7 °C) (Oral)   Resp 18   Ht 6' 0.05\" (1.83 m)   Wt 252 lb 13.9 oz (114.7 kg)   SpO2 91%   BMI 34.25 kg/m²      I/O:    Intake/Output Summary (Last 24 hours) at 07/27/18 1157  Last data filed at 07/27/18 1054   Gross per 24 hour   Intake              100 ml   Output             5825 ml   Net            -5725 ml              Wt Readings from Last 3 Encounters:   07/24/18 252 lb 13.9 oz (114.7 kg)   07/24/18 252 lb 12.8 oz (114.7 kg)   07/19/18 276 lb (125.2 kg)       LABS:    Recent Labs      07/25/18   0545  07/27/18   0737   WBC  13.5*  13.2*   HGB  8.7*  9.1*   HCT  29.0*  30.2*   PLT  420  317        Recent Labs      07/25/18   0545   NA  135*   K  3.7   CL  93*   CO2  28   BUN  40*   CREATININE  1.1      No results for input(s): PROT, INR, APTT in the last 72 hours. LOWER EXTREMITY EXAMINATION    Dressing to b/l LE intact. No strikethrough noted to the external dressing. Scant drainage noted to the internal layers of the dressing to the distal lateral left foot. DERMATOLOGIC:     Left:  - Full thickness wound noted to the lateral aspect of the left foot where previous amps of 4 and 5 were performed; wound diameter is <1.0cm and does not undermine.  Scant serosanguinous drainage noted to internal layer of dressing; no malodor noted; periwound is intact w/o surrounding erythema   - Dried eschar noted to the right lateral midfoot  - Full thickness wound noted to the plantar aspect of the left heel. Wound does not probe or undermine. Scant serosanguinous drainage noted. Right:   - Open surgical wound from s/p R Amputation 3rd & 4th ray, 5th toe, debridement of multiple compartments including bone w/removal of cuboid and lateral cuneiform, bone biopsy of cuboid and base of 3rd ray. Internal packing gauze is noted to be soaked with hemorrhagic drainage. No strikethru noted to external dressing. Periwound is intact. No surrounding erythema noted, no purulent drainage, no increase in temperature, and no malodor noted. VASCULAR:  - CFT <5 seconds to digits 1-3 on the left and 1-3 on the right   - Skin temperature from proximal to distal is warm to cool      NEUROLOGIC:  - Protective sensation absent   - Light touch sensation absent     MUSCULOSKELETAL:  - Previous 4th and 5th ray amputations noted to the the left   - s/p R Amputation 3rd & 4th ray, 5th toe, removal of cuboid and lateral cuneiform    Images from 7/25/18 LEFT foot:                   IMPRESSION/RECOMMENDATIONS:       1. Full thickness wound, lateral left foot, Mccauley Stage III  2. s/p R Amputation 3rd & 4th ray, 5th toe, debridement of multiple compartments including bone w/removal of cuboid and lateral cuneiform, bone biopsy of cuboid and base of 3rd ray (DOS: 7/26/18)       - Patient seen and examined at bedside  - VSS; afebrile overnight; leukocytosis noted of 13.2 WBC   - Wet-to-dry dressing using NSS on the distal lateral left foot   - Left LE dressed with 4x8 gauze, kerlix, and ace bandage to the knee  - Will continue to with local wound care and change dressing on the LEFT daily while patient is in-house   - Wound VAC applied to the right surgical wound. Seal was confirmed upon completion.  Will continue to monitor daily.   - Patient scheduled for surgery on Monday, 7/30/18 for possible primary delayed closure of right surgical wound s/p R Amputation 3rd & 4th ray, 5th toe, debridement of multiple compartments including bone w/removal of cuboid and lateral cuneiform, bone biopsy of cuboid and base of 3rd ray  - F/u bone biopsy results   - Lovenox restarted at patients therapeutic dose of 120 mg subQ bid        DISPO: Patients wounds on the left appear stable with no active signs of infection. Patient s/p R Amputation 3rd & 4th ray, 5th toe, debridement of multiple compartments including bone w/removal of cuboid and lateral cuneiform, bone biopsy of cuboid and base of 3rd ray. Patient scheduled to go to surgery this Monday for possible primary delayed closure of surgical wound vs STSG. Discussed assessment and plan with Dr. Sade Flannery .     Lory Vera PGY1  Pager [phone]

## 2018-07-27 NOTE — CONSULTS
Results   Component Value Date    CREATININE 1.1 2018    BUN 40 (H) 2018     (L) 2018    K 3.7 2018    CL 93 (L) 2018    CO2 28 2018       Hepatic Function Panel:   Lab Results   Component Value Date    ALKPHOS 114 2018    ALT 6 2018    AST 11 2018    PROT 7.7 2018    PROT 8.0 10/04/2011    BILITOT 0.4 2018    BILIDIR 0.08 2011    IBILI 0.2 2011    LABALBU 3.2 2018       Micro:   Surg cult (multiple): GS 1+WBC, no organism; culture - no growth to date     Wound cult: mod E coli, E faecalis, Ps aeruginosa  ENTEROCOCCUS FAECALIS   Antibiotic Interpretation DEVANTE Unit   ampicillin Sensitive <=2 mcg/mL   vancomycin Sensitive 2 mcg/mL         ESCHERICHIA COLI   Antibiotic Interpretation DEVANTE Unit   amoxicillin-clavulanate Sensitive <=8/4 mcg/mL   ampicillin Resistant >16 mcg/mL   ceFAZolin Intermediate 4 mcg/mL   cefOXitin Sensitive <=8 mcg/mL   cefTRIAXone Sensitive <=1 mcg/mL   cefepime Sensitive <=2 mcg/mL   cefotaxime Sensitive <=2 mcg/mL   cefuroxime Sensitive <=4 mcg/mL   ciprofloxacin Sensitive <=1 mcg/mL   gentamicin Sensitive <=4 mcg/mL   meropenem Sensitive <=1 mcg/mL   piperacillin-tazobactam Sensitive <=16 mcg/mL   trimethoprim-sulfamethoxazole Resistant >2/38 mcg/mL         PSEUDOMONAS AERUGINOSA   Antibiotic Interpretation DEVANTE Unit   cefepime Sensitive 8 mcg/mL   ciprofloxacin Resistant >2 mcg/mL   gentamicin Sensitive <=4 mcg/mL   meropenem Sensitive <=1 mcg/mL   piperacillin-tazobactam Sensitive <=16 mcg/mL   tobramycin Sensitive <=4 mcg/mL          Imagin/26 R foot x-ray:  Findings:    The patient is status post amputation of the fourth and fifth digit, third metatarsal, cuboid, and lateral cuneiform. No acute fracture is seen. Mottled density in the remaining tarsal bones is similar in appearance compared to prior. There is mild soft    tissue swelling.        IMPRESSION:      Patient Active Problem List
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
## 2018-07-27 NOTE — CARE COORDINATION
SW spoke with nursing who states pt will be having delayed closure next week. SW will continue to follow pts progress.     Virgilio Haile [facility]   [phone]

## 2018-07-28 LAB
ALBUMIN SERPL-MCNC: 2.9 G/DL (ref 3.4–5)
ANION GAP SERPL CALCULATED.3IONS-SCNC: 13 MMOL/L (ref 3–16)
BASOPHILS ABSOLUTE: 0.1 K/UL (ref 0–0.2)
BASOPHILS RELATIVE PERCENT: 0.8 %
BUN BLDV-MCNC: 29 MG/DL (ref 7–20)
CALCIUM SERPL-MCNC: 8.5 MG/DL (ref 8.3–10.6)
CHLORIDE BLD-SCNC: 90 MMOL/L (ref 99–110)
CO2: 28 MMOL/L (ref 21–32)
CREAT SERPL-MCNC: 1 MG/DL (ref 0.9–1.3)
EOSINOPHILS ABSOLUTE: 0.2 K/UL (ref 0–0.6)
EOSINOPHILS RELATIVE PERCENT: 1.3 %
GFR AFRICAN AMERICAN: >60
GFR NON-AFRICAN AMERICAN: >60
GLUCOSE BLD-MCNC: 254 MG/DL (ref 70–99)
GLUCOSE BLD-MCNC: 290 MG/DL (ref 70–99)
GLUCOSE BLD-MCNC: 297 MG/DL (ref 70–99)
GLUCOSE BLD-MCNC: 308 MG/DL (ref 70–99)
GLUCOSE BLD-MCNC: 343 MG/DL (ref 70–99)
GLUCOSE BLD-MCNC: 391 MG/DL (ref 70–99)
HCT VFR BLD CALC: 29 % (ref 40.5–52.5)
HEMOGLOBIN: 8.7 G/DL (ref 13.5–17.5)
INR BLD: 1.42 (ref 0.86–1.14)
LYMPHOCYTES ABSOLUTE: 1.1 K/UL (ref 1–5.1)
LYMPHOCYTES RELATIVE PERCENT: 8.4 %
MAGNESIUM: 1.2 MG/DL (ref 1.8–2.4)
MCH RBC QN AUTO: 22 PG (ref 26–34)
MCHC RBC AUTO-ENTMCNC: 30 G/DL (ref 31–36)
MCV RBC AUTO: 73.3 FL (ref 80–100)
MONOCYTES ABSOLUTE: 1.3 K/UL (ref 0–1.3)
MONOCYTES RELATIVE PERCENT: 10 %
NEUTROPHILS ABSOLUTE: 10.4 K/UL (ref 1.7–7.7)
NEUTROPHILS RELATIVE PERCENT: 79.5 %
PDW BLD-RTO: 23.2 % (ref 12.4–15.4)
PERFORMED ON: ABNORMAL
PHOSPHORUS: 2.7 MG/DL (ref 2.5–4.9)
PLATELET # BLD: 303 K/UL (ref 135–450)
PMV BLD AUTO: 8.8 FL (ref 5–10.5)
POTASSIUM SERPL-SCNC: 3.6 MMOL/L (ref 3.5–5.1)
PROTHROMBIN TIME: 16.2 SEC (ref 9.8–13)
RBC # BLD: 3.96 M/UL (ref 4.2–5.9)
SODIUM BLD-SCNC: 131 MMOL/L (ref 136–145)
WBC # BLD: 13.1 K/UL (ref 4–11)

## 2018-07-28 PROCEDURE — 2500000003 HC RX 250 WO HCPCS

## 2018-07-28 PROCEDURE — 85025 COMPLETE CBC W/AUTO DIFF WBC: CPT

## 2018-07-28 PROCEDURE — 6360000002 HC RX W HCPCS: Performed by: STUDENT IN AN ORGANIZED HEALTH CARE EDUCATION/TRAINING PROGRAM

## 2018-07-28 PROCEDURE — 80069 RENAL FUNCTION PANEL: CPT

## 2018-07-28 PROCEDURE — 2580000003 HC RX 258: Performed by: STUDENT IN AN ORGANIZED HEALTH CARE EDUCATION/TRAINING PROGRAM

## 2018-07-28 PROCEDURE — 83735 ASSAY OF MAGNESIUM: CPT

## 2018-07-28 PROCEDURE — 85610 PROTHROMBIN TIME: CPT

## 2018-07-28 PROCEDURE — 6370000000 HC RX 637 (ALT 250 FOR IP): Performed by: STUDENT IN AN ORGANIZED HEALTH CARE EDUCATION/TRAINING PROGRAM

## 2018-07-28 PROCEDURE — 2580000003 HC RX 258: Performed by: INTERNAL MEDICINE

## 2018-07-28 PROCEDURE — 1200000000 HC SEMI PRIVATE

## 2018-07-28 PROCEDURE — 6370000000 HC RX 637 (ALT 250 FOR IP): Performed by: INTERNAL MEDICINE

## 2018-07-28 RX ORDER — MAGNESIUM SULFATE IN WATER 40 MG/ML
2 INJECTION, SOLUTION INTRAVENOUS ONCE
Status: COMPLETED | OUTPATIENT
Start: 2018-07-28 | End: 2018-07-28

## 2018-07-28 RX ORDER — HYDROPHILIC CREAM
1 PASTE (GRAM) TOPICAL DAILY
Status: DISCONTINUED | OUTPATIENT
Start: 2018-07-28 | End: 2018-07-31 | Stop reason: HOSPADM

## 2018-07-28 RX ORDER — HYDROPHILIC CREAM
1 PASTE (GRAM) TOPICAL DAILY
Status: DISCONTINUED | OUTPATIENT
Start: 2018-07-28 | End: 2018-07-28

## 2018-07-28 RX ADMIN — FERROUS SULFATE TAB 325 MG (65 MG ELEMENTAL FE) 325 MG: 325 (65 FE) TAB at 09:54

## 2018-07-28 RX ADMIN — PANTOPRAZOLE SODIUM 40 MG: 40 TABLET, DELAYED RELEASE ORAL at 09:54

## 2018-07-28 RX ADMIN — TORSEMIDE 40 MG: 20 TABLET ORAL at 09:54

## 2018-07-28 RX ADMIN — ENOXAPARIN SODIUM 120 MG: 120 INJECTION SUBCUTANEOUS at 23:40

## 2018-07-28 RX ADMIN — DOCUSATE SODIUM 100 MG: 100 CAPSULE, LIQUID FILLED ORAL at 23:41

## 2018-07-28 RX ADMIN — INSULIN LISPRO 5 UNITS: 100 INJECTION, SOLUTION INTRAVENOUS; SUBCUTANEOUS at 23:44

## 2018-07-28 RX ADMIN — INSULIN LISPRO 8 UNITS: 100 INJECTION, SOLUTION INTRAVENOUS; SUBCUTANEOUS at 12:46

## 2018-07-28 RX ADMIN — SENNOSIDES 8.6 MG: 8.6 TABLET, FILM COATED ORAL at 23:41

## 2018-07-28 RX ADMIN — INSULIN HUMAN 5 UNITS: 100 INJECTION, SOLUTION PARENTERAL at 00:29

## 2018-07-28 RX ADMIN — INSULIN LISPRO 8 UNITS: 100 INJECTION, SOLUTION INTRAVENOUS; SUBCUTANEOUS at 10:02

## 2018-07-28 RX ADMIN — Medication 10 ML: at 09:55

## 2018-07-28 RX ADMIN — INSULIN LISPRO 10 UNITS: 100 INJECTION, SOLUTION INTRAVENOUS; SUBCUTANEOUS at 10:01

## 2018-07-28 RX ADMIN — Medication 1600 MG: at 18:45

## 2018-07-28 RX ADMIN — PIPERACILLIN SODIUM,TAZOBACTAM SODIUM 4.5 G: 4; .5 INJECTION, POWDER, FOR SOLUTION INTRAVENOUS at 18:45

## 2018-07-28 RX ADMIN — Medication 1 CONTAINER: at 01:47

## 2018-07-28 RX ADMIN — ENOXAPARIN SODIUM 120 MG: 120 INJECTION SUBCUTANEOUS at 11:16

## 2018-07-28 RX ADMIN — INSULIN GLARGINE 50 UNITS: 100 INJECTION, SOLUTION SUBCUTANEOUS at 23:45

## 2018-07-28 RX ADMIN — INSULIN LISPRO 9 UNITS: 100 INJECTION, SOLUTION INTRAVENOUS; SUBCUTANEOUS at 18:48

## 2018-07-28 RX ADMIN — TRAZODONE HYDROCHLORIDE 50 MG: 50 TABLET ORAL at 23:41

## 2018-07-28 RX ADMIN — Medication 10 ML: at 23:43

## 2018-07-28 RX ADMIN — PIPERACILLIN SODIUM,TAZOBACTAM SODIUM 4.5 G: 4; .5 INJECTION, POWDER, FOR SOLUTION INTRAVENOUS at 01:37

## 2018-07-28 RX ADMIN — MAGNESIUM SULFATE HEPTAHYDRATE 2 G: 40 INJECTION, SOLUTION INTRAVENOUS at 16:24

## 2018-07-28 RX ADMIN — PIPERACILLIN SODIUM,TAZOBACTAM SODIUM 4.5 G: 4; .5 INJECTION, POWDER, FOR SOLUTION INTRAVENOUS at 11:15

## 2018-07-28 RX ADMIN — INSULIN HUMAN 5 UNITS: 100 INJECTION, SOLUTION PARENTERAL at 03:55

## 2018-07-28 RX ADMIN — DOCUSATE SODIUM 100 MG: 100 CAPSULE, LIQUID FILLED ORAL at 09:54

## 2018-07-28 RX ADMIN — FLUCONAZOLE 100 MG: 100 TABLET ORAL at 09:54

## 2018-07-28 RX ADMIN — INSULIN LISPRO 10 UNITS: 100 INJECTION, SOLUTION INTRAVENOUS; SUBCUTANEOUS at 12:46

## 2018-07-28 RX ADMIN — INSULIN GLARGINE 50 UNITS: 100 INJECTION, SOLUTION SUBCUTANEOUS at 10:01

## 2018-07-28 ASSESSMENT — PAIN DESCRIPTION - ONSET: ONSET: GRADUAL

## 2018-07-28 ASSESSMENT — PAIN DESCRIPTION - DESCRIPTORS: DESCRIPTORS: ACHING

## 2018-07-28 ASSESSMENT — PAIN SCALES - GENERAL
PAINLEVEL_OUTOF10: 0
PAINLEVEL_OUTOF10: 3
PAINLEVEL_OUTOF10: 0
PAINLEVEL_OUTOF10: 0

## 2018-07-28 ASSESSMENT — PAIN DESCRIPTION - ORIENTATION: ORIENTATION: RIGHT

## 2018-07-28 ASSESSMENT — PAIN DESCRIPTION - FREQUENCY: FREQUENCY: INTERMITTENT

## 2018-07-28 ASSESSMENT — PAIN DESCRIPTION - LOCATION: LOCATION: SHOULDER
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
## 2018-07-28 NOTE — PROGRESS NOTES
Podiatric Surgery Daily Progress Note  Waldemar Contreras  Subjective :   Patient seen and examined this am at the bedside. Patient denies any acute overnight events. Patient denies N/V/F/C/SOB. Patient denies calf pain, thigh pain, chest pain. Review of Systems: A 12 point review of symptoms is unremarkable with the exception of the chief complaint. Patient specifically denies nausea, fever, vomiting, chills, shortness of breath, chest pain, abdominal pain, constipation or difficulty urinating. Objective     /70   Pulse 96   Temp 97.7 °F (36.5 °C) (Oral)   Resp 18   Ht 6' 0.05\" (1.83 m)   Wt 252 lb 13.9 oz (114.7 kg)   SpO2 97%   BMI 34.25 kg/m²      I/O:    Intake/Output Summary (Last 24 hours) at 07/28/18 0953  Last data filed at 07/28/18 0939   Gross per 24 hour   Intake              440 ml   Output             3000 ml   Net            -2560 ml              Wt Readings from Last 3 Encounters:   07/24/18 252 lb 13.9 oz (114.7 kg)   07/24/18 252 lb 12.8 oz (114.7 kg)   07/19/18 276 lb (125.2 kg)       LABS:    Recent Labs      07/27/18   0737  07/28/18   0620   WBC  13.2*  13.1*   HGB  9.1*  8.7*   HCT  30.2*  29.0*   PLT  317  303        No results for input(s): NA, K, CL, CO2, PHOS, BUN, CREATININE in the last 72 hours. Invalid input(s): CA     Recent Labs      07/28/18   0620   INR  1.42*           LOWER EXTREMITY EXAMINATION    Dressing to b/l LE intact. No strikethrough noted to the external dressing. Scant drainage noted to the internal layers of the dressing to the distal lateral left foot. DERMATOLOGIC:     Left:  - Full thickness wound noted to the lateral aspect of the left foot where previous amps of 4 and 5 were performed; wound diameter is <1.0cm and does not undermine. Scant serosanguinous drainage noted to internal layer of dressing; no malodor noted; periwound is intact w/o surrounding erythema.   - Dried eschar noted to the lateral aspect of the midfoot  - Full thickness wound noted to the plantar aspect of the left heel. Wound does not probe or undermine. Scant serosanguinous drainage noted. Right:   - Open surgical wound from s/p R Amputation 3rd & 4th ray, 5th toe, debridement of multiple compartments including bone w/removal of cuboid and lateral cuneiform, bone biopsy of cuboid and base of 3rd ray. Wound VAC intact. No strikethrough noted to external dressing. Periwound is intact. No surrounding erythema noted, no purulent drainage, no increase in temperature, and no malodor noted. VASCULAR:  - CFT <5 seconds to digits 1-3 on the left and 1-3 on the right   - Skin temperature is warm to cool from proximal to distal     NEUROLOGIC:  - Protective sensation absent b/l  - Light touch sensation absent b/l    MUSCULOSKELETAL:  - Previous 4th and 5th ray amputations noted to the the left   - s/p Amputation of 3rd & 4th ray, 5th toe, removal of cuboid and lateral cuneiform, Right. Images from 7/25/18 LEFT foot:                   IMPRESSION/RECOMMENDATIONS:       1. Full thickness wound, lateral aspect left foot, Mccauley Stage II  2. s/p R Amputation 3rd & 4th ray, 5th toe, debridement of multiple compartments including bone w/removal of cuboid and lateral cuneiform, bone biopsy of cuboid and base of 3rd ray (DOS: 7/26/18)       - Patient seen and examined at bedside  - VSS; afebrile overnight; leukocytosis noted of 13.1 WBC   - Wet-to-dry dressing using NSS on the distal lateral left foot   - Left LE dressed with adaptic to heel, 4x8 gauze, kerlix, and ace bandage to the knee  - Will continue to with local wound care and change dressing on the LEFT daily while patient is in-house   - Wound VAC left intact to the right surgical wound running continuously at 125 mmHg.  Will continue to monitor daily.   - Patient to be scheduled for surgery on Monday, 7/30/18 for possible primary delayed closure of right surgical wound s/p R Amputation 3rd & 4th ray, 5th toe, debridement of multiple compartments including bone w/removal of cuboid and lateral cuneiform, bone biopsy of cuboid and base of 3rd ray, with possible STSG of left and right foot. - F/u bone biopsy results        DISPO: Patients wounds on the left appear stable with no active signs of infection. Patient s/p R Amputation 3rd & 4th ray, 5th toe, debridement of multiple compartments including bone w/removal of cuboid and lateral cuneiform, bone biopsy of cuboid and base of 3rd ray. Patient scheduled to go to surgery this Monday for possible primary delayed closure of surgical wound with possible STSG. Discussed assessment and plan with Dr. Esperanza Leon .       Austen Lowe DPM PGY-1  Pager: [phone]

## 2018-07-29 LAB
ALBUMIN SERPL-MCNC: 3.1 G/DL (ref 3.4–5)
ANION GAP SERPL CALCULATED.3IONS-SCNC: 12 MMOL/L (ref 3–16)
BASOPHILS ABSOLUTE: 0.1 K/UL (ref 0–0.2)
BASOPHILS RELATIVE PERCENT: 1.2 %
BUN BLDV-MCNC: 29 MG/DL (ref 7–20)
CALCIUM SERPL-MCNC: 9 MG/DL (ref 8.3–10.6)
CHLORIDE BLD-SCNC: 91 MMOL/L (ref 99–110)
CO2: 30 MMOL/L (ref 21–32)
CREAT SERPL-MCNC: 0.8 MG/DL (ref 0.9–1.3)
EOSINOPHILS ABSOLUTE: 0.6 K/UL (ref 0–0.6)
EOSINOPHILS RELATIVE PERCENT: 4.6 %
GFR AFRICAN AMERICAN: >60
GFR NON-AFRICAN AMERICAN: >60
GLUCOSE BLD-MCNC: 112 MG/DL (ref 70–99)
GLUCOSE BLD-MCNC: 135 MG/DL (ref 70–99)
GLUCOSE BLD-MCNC: 168 MG/DL (ref 70–99)
GLUCOSE BLD-MCNC: 246 MG/DL (ref 70–99)
GLUCOSE BLD-MCNC: 306 MG/DL (ref 70–99)
HCT VFR BLD CALC: 27.5 % (ref 40.5–52.5)
HEMOGLOBIN: 8.3 G/DL (ref 13.5–17.5)
INR BLD: 1.29 (ref 0.86–1.14)
LYMPHOCYTES ABSOLUTE: 1.2 K/UL (ref 1–5.1)
LYMPHOCYTES RELATIVE PERCENT: 10.2 %
MAGNESIUM: 1.6 MG/DL (ref 1.8–2.4)
MCH RBC QN AUTO: 21.9 PG (ref 26–34)
MCHC RBC AUTO-ENTMCNC: 30.3 G/DL (ref 31–36)
MCV RBC AUTO: 72.4 FL (ref 80–100)
MONOCYTES ABSOLUTE: 1 K/UL (ref 0–1.3)
MONOCYTES RELATIVE PERCENT: 8.1 %
NEUTROPHILS ABSOLUTE: 9.2 K/UL (ref 1.7–7.7)
NEUTROPHILS RELATIVE PERCENT: 75.9 %
PDW BLD-RTO: 23.2 % (ref 12.4–15.4)
PERFORMED ON: ABNORMAL
PHOSPHORUS: 2.6 MG/DL (ref 2.5–4.9)
PLATELET # BLD: 295 K/UL (ref 135–450)
PMV BLD AUTO: 8.3 FL (ref 5–10.5)
POTASSIUM SERPL-SCNC: 3.3 MMOL/L (ref 3.5–5.1)
PROTHROMBIN TIME: 14.7 SEC (ref 9.8–13)
RBC # BLD: 3.8 M/UL (ref 4.2–5.9)
REASON FOR REJECTION: NORMAL
REJECTED TEST: NORMAL
SODIUM BLD-SCNC: 133 MMOL/L (ref 136–145)
WBC # BLD: 12.1 K/UL (ref 4–11)

## 2018-07-29 PROCEDURE — 1200000000 HC SEMI PRIVATE

## 2018-07-29 PROCEDURE — 6360000002 HC RX W HCPCS: Performed by: STUDENT IN AN ORGANIZED HEALTH CARE EDUCATION/TRAINING PROGRAM

## 2018-07-29 PROCEDURE — 6370000000 HC RX 637 (ALT 250 FOR IP): Performed by: INTERNAL MEDICINE

## 2018-07-29 PROCEDURE — 2580000003 HC RX 258: Performed by: STUDENT IN AN ORGANIZED HEALTH CARE EDUCATION/TRAINING PROGRAM

## 2018-07-29 PROCEDURE — 83735 ASSAY OF MAGNESIUM: CPT

## 2018-07-29 PROCEDURE — 85025 COMPLETE CBC W/AUTO DIFF WBC: CPT

## 2018-07-29 PROCEDURE — 6370000000 HC RX 637 (ALT 250 FOR IP): Performed by: STUDENT IN AN ORGANIZED HEALTH CARE EDUCATION/TRAINING PROGRAM

## 2018-07-29 PROCEDURE — 85610 PROTHROMBIN TIME: CPT

## 2018-07-29 PROCEDURE — 2580000003 HC RX 258: Performed by: INTERNAL MEDICINE

## 2018-07-29 PROCEDURE — 80069 RENAL FUNCTION PANEL: CPT

## 2018-07-29 PROCEDURE — 36415 COLL VENOUS BLD VENIPUNCTURE: CPT

## 2018-07-29 RX ORDER — FERROUS SULFATE 325(65) MG
325 TABLET ORAL SEE ADMIN INSTRUCTIONS
Status: DISCONTINUED | OUTPATIENT
Start: 2018-07-29 | End: 2018-07-29 | Stop reason: SDUPTHER

## 2018-07-29 RX ORDER — FERROUS SULFATE 325(65) MG
650 TABLET ORAL
Status: DISCONTINUED | OUTPATIENT
Start: 2018-07-29 | End: 2018-07-31 | Stop reason: HOSPADM

## 2018-07-29 RX ORDER — MAGNESIUM SULFATE 1 G/100ML
1 INJECTION INTRAVENOUS ONCE
Status: COMPLETED | OUTPATIENT
Start: 2018-07-29 | End: 2018-07-29

## 2018-07-29 RX ORDER — POTASSIUM CHLORIDE 20 MEQ/1
40 TABLET, EXTENDED RELEASE ORAL ONCE
Status: COMPLETED | OUTPATIENT
Start: 2018-07-29 | End: 2018-07-29

## 2018-07-29 RX ORDER — FERROUS SULFATE 325(65) MG
325 TABLET ORAL
Status: DISCONTINUED | OUTPATIENT
Start: 2018-07-30 | End: 2018-07-31 | Stop reason: HOSPADM

## 2018-07-29 RX ORDER — POTASSIUM CHLORIDE 1.5 G/1.77G
40 POWDER, FOR SOLUTION ORAL ONCE
Status: DISCONTINUED | OUTPATIENT
Start: 2018-07-29 | End: 2018-07-29

## 2018-07-29 RX ADMIN — OXYCODONE HYDROCHLORIDE 5 MG: 5 TABLET ORAL at 23:22

## 2018-07-29 RX ADMIN — INSULIN LISPRO 3 UNITS: 100 INJECTION, SOLUTION INTRAVENOUS; SUBCUTANEOUS at 18:27

## 2018-07-29 RX ADMIN — PIPERACILLIN SODIUM,TAZOBACTAM SODIUM 4.5 G: 4; .5 INJECTION, POWDER, FOR SOLUTION INTRAVENOUS at 18:23

## 2018-07-29 RX ADMIN — INSULIN LISPRO 6 UNITS: 100 INJECTION, SOLUTION INTRAVENOUS; SUBCUTANEOUS at 23:23

## 2018-07-29 RX ADMIN — DOCUSATE SODIUM 100 MG: 100 CAPSULE, LIQUID FILLED ORAL at 09:42

## 2018-07-29 RX ADMIN — Medication 1600 MG: at 09:42

## 2018-07-29 RX ADMIN — Medication 10 ML: at 09:45

## 2018-07-29 RX ADMIN — MAGNESIUM SULFATE HEPTAHYDRATE 1 G: 1 INJECTION, SOLUTION INTRAVENOUS at 12:07

## 2018-07-29 RX ADMIN — INSULIN LISPRO 9 UNITS: 100 INJECTION, SOLUTION INTRAVENOUS; SUBCUTANEOUS at 12:11

## 2018-07-29 RX ADMIN — INSULIN GLARGINE 50 UNITS: 100 INJECTION, SOLUTION SUBCUTANEOUS at 09:46

## 2018-07-29 RX ADMIN — PIPERACILLIN SODIUM,TAZOBACTAM SODIUM 4.5 G: 4; .5 INJECTION, POWDER, FOR SOLUTION INTRAVENOUS at 09:41

## 2018-07-29 RX ADMIN — FERROUS SULFATE TAB 325 MG (65 MG ELEMENTAL FE) 650 MG: 325 (65 FE) TAB at 18:23

## 2018-07-29 RX ADMIN — FERROUS SULFATE TAB 325 MG (65 MG ELEMENTAL FE) 325 MG: 325 (65 FE) TAB at 09:42

## 2018-07-29 RX ADMIN — Medication 1600 MG: at 23:22

## 2018-07-29 RX ADMIN — PIPERACILLIN SODIUM,TAZOBACTAM SODIUM 4.5 G: 4; .5 INJECTION, POWDER, FOR SOLUTION INTRAVENOUS at 02:43

## 2018-07-29 RX ADMIN — ALTEPLASE 1 MG: 2.2 INJECTION, POWDER, LYOPHILIZED, FOR SOLUTION INTRAVENOUS at 14:30

## 2018-07-29 RX ADMIN — TORSEMIDE 40 MG: 20 TABLET ORAL at 09:42

## 2018-07-29 RX ADMIN — POTASSIUM CHLORIDE 40 MEQ: 1500 TABLET, EXTENDED RELEASE ORAL at 14:30

## 2018-07-29 RX ADMIN — Medication 1 CONTAINER: at 09:46

## 2018-07-29 RX ADMIN — ALTEPLASE 1 MG: 2.2 INJECTION, POWDER, LYOPHILIZED, FOR SOLUTION INTRAVENOUS at 20:45

## 2018-07-29 RX ADMIN — FLUCONAZOLE 100 MG: 100 TABLET ORAL at 09:42

## 2018-07-29 RX ADMIN — ENOXAPARIN SODIUM 120 MG: 120 INJECTION SUBCUTANEOUS at 09:43

## 2018-07-29 RX ADMIN — PANTOPRAZOLE SODIUM 40 MG: 40 TABLET, DELAYED RELEASE ORAL at 09:42

## 2018-07-29 RX ADMIN — TRAZODONE HYDROCHLORIDE 50 MG: 50 TABLET ORAL at 23:21

## 2018-07-29 RX ADMIN — DOCUSATE SODIUM 100 MG: 100 CAPSULE, LIQUID FILLED ORAL at 23:22

## 2018-07-29 RX ADMIN — SENNOSIDES 8.6 MG: 8.6 TABLET, FILM COATED ORAL at 23:22

## 2018-07-29 RX ADMIN — OXYCODONE HYDROCHLORIDE 5 MG: 5 TABLET ORAL at 00:00

## 2018-07-29 ASSESSMENT — PAIN SCALES - GENERAL
PAINLEVEL_OUTOF10: 5
PAINLEVEL_OUTOF10: 7
PAINLEVEL_OUTOF10: 0
PAINLEVEL_OUTOF10: 0

## 2018-07-29 ASSESSMENT — PAIN DESCRIPTION - PROGRESSION
CLINICAL_PROGRESSION: NOT CHANGED
CLINICAL_PROGRESSION: GRADUALLY WORSENING

## 2018-07-29 ASSESSMENT — PAIN DESCRIPTION - FREQUENCY
FREQUENCY: CONTINUOUS
FREQUENCY: CONTINUOUS

## 2018-07-29 ASSESSMENT — PAIN DESCRIPTION - ONSET
ONSET: GRADUAL
ONSET: ON-GOING

## 2018-07-29 ASSESSMENT — PAIN DESCRIPTION - LOCATION
LOCATION: HEAD
LOCATION: HEAD

## 2018-07-29 ASSESSMENT — PAIN DESCRIPTION - DESCRIPTORS
DESCRIPTORS: ACHING
DESCRIPTORS: HEADACHE

## 2018-07-29 ASSESSMENT — PAIN DESCRIPTION - PAIN TYPE
TYPE: ACUTE PAIN
TYPE: ACUTE PAIN

## 2018-07-29 NOTE — PROGRESS NOTES
Podiatric Surgery Daily Progress Note  Ronald Figueroa  Subjective :   Patient seen and examined this am at the bedside. Patient denies any acute overnight events. Patient denies N/V/F/C/SOB. Patient denies calf pain, thigh pain, chest pain. Review of Systems: A 12 point review of symptoms is unremarkable with the exception of the chief complaint. Patient specifically denies nausea, fever, vomiting, chills, shortness of breath, chest pain, abdominal pain, constipation or difficulty urinating. Objective     BP 97/64   Pulse 88   Temp 97 °F (36.1 °C) (Oral)   Resp 18   Ht 6' 0.05\" (1.83 m)   Wt 252 lb 13.9 oz (114.7 kg)   SpO2 92%   BMI 34.25 kg/m²      I/O:    Intake/Output Summary (Last 24 hours) at 07/29/18 0944  Last data filed at 07/29/18 0933   Gross per 24 hour   Intake              400 ml   Output             2550 ml   Net            -2150 ml              Wt Readings from Last 3 Encounters:   07/24/18 252 lb 13.9 oz (114.7 kg)   07/24/18 252 lb 12.8 oz (114.7 kg)   07/19/18 276 lb (125.2 kg)       LABS:    Recent Labs      07/28/18   0620  07/29/18   0645   WBC  13.1*  12.1*   HGB  8.7*  8.3*   HCT  29.0*  27.5*   PLT  303  295        Recent Labs      07/29/18   0645   NA  133*   K  3.3*   CL  91*   CO2  30   PHOS  2.6   BUN  29*   CREATININE  0.8*        Recent Labs      07/28/18   0620  07/29/18   0745   INR  1.42*  1.29*           LOWER EXTREMITY EXAMINATION    Dressing to b/l LE intact. No strikethrough noted to the external dressing. Scant drainage noted to the internal layers of the dressing to the distal lateral left foot. DERMATOLOGIC:     Left:  - Full thickness wound noted to the lateral aspect of the left foot where previous amps of 4 and 5 were performed; wound diameter is <1.0cm and does not undermine. Scant serosanguinous drainage noted to internal layer of dressing; no malodor noted; periwound is intact w/o surrounding erythema.   - Dried eschar noted to the lateral aspect of the midfoot  - Full thickness wound noted to the plantar aspect of the left heel. Wound does not probe or undermine. Scant serosanguinous drainage noted. Right:   - Open surgical wound from s/p R Amputation 3rd & 4th ray, 5th toe, debridement of multiple compartments including bone w/removal of cuboid and lateral cuneiform, bone biopsy of cuboid and base of 3rd ray. Wound VAC intact. No strikethrough noted to external dressing. Periwound is intact. No surrounding erythema noted, no purulent drainage, no increase in temperature, and no malodor noted. VASCULAR:  - CFT <5 seconds to digits 1-3 on the left and 1-3 on the right   - Skin temperature is warm to cool from proximal to distal     NEUROLOGIC:  - Protective sensation absent b/l  - Light touch sensation absent b/l    MUSCULOSKELETAL:  - Previous 4th and 5th ray amputations noted to the the left   - s/p Amputation of 3rd & 4th ray, 5th toe, removal of cuboid and lateral cuneiform, Right. Images from 7/29/18 LEFT foot:                 IMPRESSION/RECOMMENDATIONS:       1. Full thickness wound, lateral aspect left foot, Mccauley Stage I  2. Full thickness wound, Posterior heel, Left foot, Mccauley stage 1  3. s/p R Amputation 3rd & 4th ray, 5th toe, debridement of multiple compartments including bone w/removal of cuboid and lateral cuneiform, bone biopsy of cuboid and base of 3rd ray (DOS: 7/26/18)       - Patient seen and examined at bedside this am  - VSS; afebrile overnight; leukocytosis noted of 12.1 WBC   - Wet-to-dry dressing using NSS on the distal lateral left foot   - Left LE dressed with adaptic to heel, 4x8 gauze, kerlix, and ace bandage to the knee  - Will continue with local wound care and change dressing on the LEFT daily while patient is in-house   - Wound VAC left intact to the right surgical wound running continuously at 125 mmHg.  Will continue to monitor daily.   - Patient to be scheduled for surgery on Monday, 7/30/18 for incision and drainage with delayed primary closure of right foot, STSG left heel, STSG, Right. DISPO: Patients wounds on the left appear stable with no active signs of infection. Patient s/p R Amputation 3rd & 4th ray, 5th toe, debridement of multiple compartments including bone w/removal of cuboid and lateral cuneiform, bone biopsy of cuboid and base of 3rd ray. Patient will be scheduled to go to surgery this Monday for possible primary delayed closure of surgical wound with possible STSG. Discussed assessment and plan with Dr. Mamadou Ortiz .       Liana Raygoza DPM PGY-1  Pager: [phone]

## 2018-07-29 NOTE — PLAN OF CARE
Problem: Falls - Risk of:  Goal: Will remain free from falls  Will remain free from falls   Outcome: Ongoing  Pt is free from falls at this time. Bed is in lowest position, wheels are locked and bed alarm is set. Call light and belongings are within reach. Visual fall sign/blanket are posted. Will continue to monitor. Problem: Risk for Impaired Skin Integrity  Goal: Tissue integrity - skin and mucous membranes  Structural intactness and normal physiological function of skin and  mucous membranes. Outcome: Ongoing  Pt at risk for skin breakdown. Skin assessment complete. No  New signs of skin breakdown. Pts skin cleansed with inc cleanser. Pt repositioned in bed with pillow support. Specialty Mattress in use. Will continue to monitor.

## 2018-07-29 NOTE — PROGRESS NOTES
Progress Note    Admit Date: 7/24/2018  Diet: DIET CARB CONTROL; Carb Control: 4 carb choices (60 gms)/meal  Dietary Nutrition Supplements: Wound Healing Oral Supplement    Interval history: Patient was seen and examined bedside. Denies pain in both feet. Denies diarrhea, chest pain, SOB, fever, chill or abdominal pain.     Medications:     Scheduled Meds:   potassium chloride  40 mEq Oral Once    magnesium sulfate  1 g Intravenous Once    Triad Hydrophilic Wound Dressi  1 Container Apply externally Daily    insulin lispro  0-18 Units Subcutaneous TID WC    insulin lispro  0-9 Units Subcutaneous Nightly    magnesium oxide  1,600 mg Oral BID    insulin lispro  15 Units Subcutaneous TID WC    insulin glargine  50 Units Subcutaneous BID    piperacillin-tazobactam  4.5 g Intravenous Q8H    enoxaparin  1 mg/kg Subcutaneous BID    aspirin  81 mg Oral Daily    clopidogrel  75 mg Oral Daily    docusate sodium  100 mg Oral BID    ferrous sulfate  325 mg Oral Daily with breakfast    metoprolol succinate  100 mg Oral Daily    pantoprazole  40 mg Oral Daily    senna  1 tablet Oral Nightly    torsemide  40 mg Oral Daily    traZODone  50 mg Oral Nightly    sodium chloride flush  10 mL Intravenous 2 times per day    fluconazole  100 mg Oral Daily     Continuous Infusions:   dextrose       PRN Meds:cyclobenzaprine, oxyCODONE, promethazine, sodium chloride flush, magnesium hydroxide, ondansetron, glucose, dextrose, glucagon (rDNA), dextrose    Objective:   Vitals:   T-max:  Patient Vitals for the past 8 hrs:   BP Temp Temp src Pulse Resp SpO2   07/29/18 0929 97/64 97 °F (36.1 °C) Oral 88 18 92 %   07/29/18 0243 109/71 98.5 °F (36.9 °C) Oral 93 16 94 %       Intake/Output Summary (Last 24 hours) at 07/29/18 0948  Last data filed at 07/29/18 0933   Gross per 24 hour   Intake              400 ml   Output             2550 ml   Net            -2150 ml       Physical Exam  General appearance: Resting comfortably in 7/26.  Restarted 7/27  - Increased to high-dose sliding scale  - Continue Lantus 50 U BID and 15 Units pre-meal   - give 25 lantus tomorrow am.     Gittelman's Syndrome  Patient has a history of Gittelmans syndrome and takes Mg 400 mg 4x in the morning and 5x at night. Mg checked 7/28 was 1.2  - Given Mg IV 2g over 2 hours  - PO mag 1600mg BID  - daily Mg levels ordered     Pulmonary Embolism  Patient has history of chronic DVTs. CTA from 7/21 showed evidence of non-obstructing pulmonary embolism. - Continue therapeutic lovenox     CAD s/p 5 vessel CABG and PCI 2017  - Cardiology following  - continue plavix 75mg and ASA 81     Acute on chronic CHF with EF 35%: stable  - Continue torsemide oral 40 mg  - Continue Metoprolol 100 mg xl  - Continue aspirin 81 mg    Code Status: Full  FEN: Carb control, NPO will be effective tomorrow am.  PPX: Lovenox therapeutic  DISPO: Floor    [facility], PGY-2  Pager; [phone]  07/29/18  9:48 AM    This patient has been staffed and discussed with Alba Ortega MD.       Patient seen and examined, plan of care discussed with residents. Agree with their assessment and plan with following addendum:  Surgery is planned for Monday evening. Will adjust lantus accordingly.        Alba Ortega

## 2018-07-30 ENCOUNTER — ANESTHESIA EVENT (OUTPATIENT)
Dept: OPERATING ROOM | Age: 53
DRG: 616 | End: 2018-07-30
Payer: MEDICARE

## 2018-07-30 ENCOUNTER — ANESTHESIA (OUTPATIENT)
Dept: OPERATING ROOM | Age: 53
DRG: 616 | End: 2018-07-30
Payer: MEDICARE

## 2018-07-30 VITALS
OXYGEN SATURATION: 100 % | RESPIRATION RATE: 18 BRPM | DIASTOLIC BLOOD PRESSURE: 70 MMHG | SYSTOLIC BLOOD PRESSURE: 94 MMHG

## 2018-07-30 LAB
ALBUMIN SERPL-MCNC: 3.2 G/DL (ref 3.4–5)
ANION GAP SERPL CALCULATED.3IONS-SCNC: 11 MMOL/L (ref 3–16)
BASOPHILS ABSOLUTE: 0.1 K/UL (ref 0–0.2)
BASOPHILS RELATIVE PERCENT: 0.7 %
BUN BLDV-MCNC: 31 MG/DL (ref 7–20)
CALCIUM SERPL-MCNC: 8.9 MG/DL (ref 8.3–10.6)
CHLORIDE BLD-SCNC: 88 MMOL/L (ref 99–110)
CO2: 30 MMOL/L (ref 21–32)
CREAT SERPL-MCNC: 0.9 MG/DL (ref 0.9–1.3)
EOSINOPHILS ABSOLUTE: 0.6 K/UL (ref 0–0.6)
EOSINOPHILS RELATIVE PERCENT: 4.9 %
GFR AFRICAN AMERICAN: >60
GFR NON-AFRICAN AMERICAN: >60
GLUCOSE BLD-MCNC: 146 MG/DL (ref 70–99)
GLUCOSE BLD-MCNC: 158 MG/DL (ref 70–99)
GLUCOSE BLD-MCNC: 159 MG/DL (ref 70–99)
GLUCOSE BLD-MCNC: 170 MG/DL (ref 70–99)
GLUCOSE BLD-MCNC: 177 MG/DL (ref 70–99)
HCT VFR BLD CALC: 26.9 % (ref 40.5–52.5)
HCT VFR BLD CALC: 27.3 % (ref 40.5–52.5)
HEMOGLOBIN: 8.2 G/DL (ref 13.5–17.5)
HEMOGLOBIN: 8.3 G/DL (ref 13.5–17.5)
INR BLD: 1.24 (ref 0.86–1.14)
LYMPHOCYTES ABSOLUTE: 1.1 K/UL (ref 1–5.1)
LYMPHOCYTES RELATIVE PERCENT: 9.7 %
MAGNESIUM: 1.8 MG/DL (ref 1.8–2.4)
MCH RBC QN AUTO: 22 PG (ref 26–34)
MCHC RBC AUTO-ENTMCNC: 30.5 G/DL (ref 31–36)
MCV RBC AUTO: 72 FL (ref 80–100)
MONOCYTES ABSOLUTE: 1 K/UL (ref 0–1.3)
MONOCYTES RELATIVE PERCENT: 8.4 %
NEUTROPHILS ABSOLUTE: 8.8 K/UL (ref 1.7–7.7)
NEUTROPHILS RELATIVE PERCENT: 76.3 %
PDW BLD-RTO: 23.6 % (ref 12.4–15.4)
PERFORMED ON: ABNORMAL
PHOSPHORUS: 2.5 MG/DL (ref 2.5–4.9)
PLATELET # BLD: 327 K/UL (ref 135–450)
PMV BLD AUTO: 8.3 FL (ref 5–10.5)
POTASSIUM SERPL-SCNC: 3.8 MMOL/L (ref 3.5–5.1)
PROTHROMBIN TIME: 14.1 SEC (ref 9.8–13)
RBC # BLD: 3.79 M/UL (ref 4.2–5.9)
SODIUM BLD-SCNC: 129 MMOL/L (ref 136–145)
WBC # BLD: 11.6 K/UL (ref 4–11)

## 2018-07-30 PROCEDURE — 0HRMX74 REPLACEMENT OF RIGHT FOOT SKIN WITH AUTOLOGOUS TISSUE SUBSTITUTE, PARTIAL THICKNESS, EXTERNAL APPROACH: ICD-10-PCS | Performed by: PODIATRIST

## 2018-07-30 PROCEDURE — 2580000003 HC RX 258: Performed by: INTERNAL MEDICINE

## 2018-07-30 PROCEDURE — 6360000002 HC RX W HCPCS: Performed by: NURSE ANESTHETIST, CERTIFIED REGISTERED

## 2018-07-30 PROCEDURE — 6360000002 HC RX W HCPCS: Performed by: INTERNAL MEDICINE

## 2018-07-30 PROCEDURE — 2720000010 HC SURG SUPPLY STERILE: Performed by: PODIATRIST

## 2018-07-30 PROCEDURE — 3600000004 HC SURGERY LEVEL 4 BASE: Performed by: PODIATRIST

## 2018-07-30 PROCEDURE — 2580000003 HC RX 258: Performed by: STUDENT IN AN ORGANIZED HEALTH CARE EDUCATION/TRAINING PROGRAM

## 2018-07-30 PROCEDURE — 99233 SBSQ HOSP IP/OBS HIGH 50: CPT | Performed by: INTERNAL MEDICINE

## 2018-07-30 PROCEDURE — 85025 COMPLETE CBC W/AUTO DIFF WBC: CPT

## 2018-07-30 PROCEDURE — 85018 HEMOGLOBIN: CPT

## 2018-07-30 PROCEDURE — 2500000003 HC RX 250 WO HCPCS: Performed by: NURSE ANESTHETIST, CERTIFIED REGISTERED

## 2018-07-30 PROCEDURE — 99221 1ST HOSP IP/OBS SF/LOW 40: CPT | Performed by: NURSE PRACTITIONER

## 2018-07-30 PROCEDURE — 7100000000 HC PACU RECOVERY - FIRST 15 MIN: Performed by: PODIATRIST

## 2018-07-30 PROCEDURE — 6360000002 HC RX W HCPCS: Performed by: STUDENT IN AN ORGANIZED HEALTH CARE EDUCATION/TRAINING PROGRAM

## 2018-07-30 PROCEDURE — 6370000000 HC RX 637 (ALT 250 FOR IP): Performed by: INTERNAL MEDICINE

## 2018-07-30 PROCEDURE — 0HRNX74 REPLACEMENT OF LEFT FOOT SKIN WITH AUTOLOGOUS TISSUE SUBSTITUTE, PARTIAL THICKNESS, EXTERNAL APPROACH: ICD-10-PCS | Performed by: PODIATRIST

## 2018-07-30 PROCEDURE — 2709999900 HC NON-CHARGEABLE SUPPLY: Performed by: PODIATRIST

## 2018-07-30 PROCEDURE — 2580000003 HC RX 258: Performed by: NURSE ANESTHETIST, CERTIFIED REGISTERED

## 2018-07-30 PROCEDURE — 83735 ASSAY OF MAGNESIUM: CPT

## 2018-07-30 PROCEDURE — 6370000000 HC RX 637 (ALT 250 FOR IP): Performed by: STUDENT IN AN ORGANIZED HEALTH CARE EDUCATION/TRAINING PROGRAM

## 2018-07-30 PROCEDURE — 3700000001 HC ADD 15 MINUTES (ANESTHESIA): Performed by: PODIATRIST

## 2018-07-30 PROCEDURE — 0HBJXZZ EXCISION OF LEFT UPPER LEG SKIN, EXTERNAL APPROACH: ICD-10-PCS | Performed by: PODIATRIST

## 2018-07-30 PROCEDURE — 3700000000 HC ANESTHESIA ATTENDED CARE: Performed by: PODIATRIST

## 2018-07-30 PROCEDURE — 85014 HEMATOCRIT: CPT

## 2018-07-30 PROCEDURE — 2580000003 HC RX 258: Performed by: PODIATRIST

## 2018-07-30 PROCEDURE — 85610 PROTHROMBIN TIME: CPT

## 2018-07-30 PROCEDURE — 1200000000 HC SEMI PRIVATE

## 2018-07-30 PROCEDURE — 3600000014 HC SURGERY LEVEL 4 ADDTL 15MIN: Performed by: PODIATRIST

## 2018-07-30 PROCEDURE — 7100000001 HC PACU RECOVERY - ADDTL 15 MIN: Performed by: PODIATRIST

## 2018-07-30 PROCEDURE — 80069 RENAL FUNCTION PANEL: CPT

## 2018-07-30 PROCEDURE — 99222 1ST HOSP IP/OBS MODERATE 55: CPT | Performed by: SURGERY

## 2018-07-30 PROCEDURE — 6370000000 HC RX 637 (ALT 250 FOR IP): Performed by: PODIATRIST

## 2018-07-30 RX ORDER — SUCCINYLCHOLINE CHLORIDE 20 MG/ML
INJECTION INTRAMUSCULAR; INTRAVENOUS PRN
Status: DISCONTINUED | OUTPATIENT
Start: 2018-07-30 | End: 2018-07-30 | Stop reason: SDUPTHER

## 2018-07-30 RX ORDER — MINERAL OIL
OIL (ML) MISCELLANEOUS PRN
Status: DISCONTINUED | OUTPATIENT
Start: 2018-07-30 | End: 2018-07-30 | Stop reason: HOSPADM

## 2018-07-30 RX ORDER — NEOSTIGMINE METHYLSULFATE 5 MG/5 ML
SYRINGE (ML) INTRAVENOUS PRN
Status: DISCONTINUED | OUTPATIENT
Start: 2018-07-30 | End: 2018-07-30 | Stop reason: SDUPTHER

## 2018-07-30 RX ORDER — ONDANSETRON 2 MG/ML
4 INJECTION INTRAMUSCULAR; INTRAVENOUS
Status: DISCONTINUED | OUTPATIENT
Start: 2018-07-30 | End: 2018-07-30 | Stop reason: HOSPADM

## 2018-07-30 RX ORDER — LIDOCAINE HYDROCHLORIDE 20 MG/ML
INJECTION, SOLUTION INFILTRATION; PERINEURAL PRN
Status: DISCONTINUED | OUTPATIENT
Start: 2018-07-30 | End: 2018-07-30 | Stop reason: SDUPTHER

## 2018-07-30 RX ORDER — ONDANSETRON 2 MG/ML
INJECTION INTRAMUSCULAR; INTRAVENOUS PRN
Status: DISCONTINUED | OUTPATIENT
Start: 2018-07-30 | End: 2018-07-30 | Stop reason: SDUPTHER

## 2018-07-30 RX ORDER — HYDRALAZINE HYDROCHLORIDE 20 MG/ML
5 INJECTION INTRAMUSCULAR; INTRAVENOUS EVERY 10 MIN PRN
Status: DISCONTINUED | OUTPATIENT
Start: 2018-07-30 | End: 2018-07-30 | Stop reason: HOSPADM

## 2018-07-30 RX ORDER — MEPERIDINE HYDROCHLORIDE 25 MG/ML
12.5 INJECTION INTRAMUSCULAR; INTRAVENOUS; SUBCUTANEOUS EVERY 5 MIN PRN
Status: DISCONTINUED | OUTPATIENT
Start: 2018-07-30 | End: 2018-07-30 | Stop reason: HOSPADM

## 2018-07-30 RX ORDER — PIPERACILLIN SODIUM, TAZOBACTAM SODIUM 3; .375 G/15ML; G/15ML
INJECTION, POWDER, LYOPHILIZED, FOR SOLUTION INTRAVENOUS PRN
Status: DISCONTINUED | OUTPATIENT
Start: 2018-07-30 | End: 2018-07-30 | Stop reason: SDUPTHER

## 2018-07-30 RX ORDER — SODIUM CHLORIDE, SODIUM LACTATE, POTASSIUM CHLORIDE, CALCIUM CHLORIDE 600; 310; 30; 20 MG/100ML; MG/100ML; MG/100ML; MG/100ML
INJECTION, SOLUTION INTRAVENOUS CONTINUOUS PRN
Status: DISCONTINUED | OUTPATIENT
Start: 2018-07-30 | End: 2018-07-30 | Stop reason: SDUPTHER

## 2018-07-30 RX ORDER — ROCURONIUM BROMIDE 10 MG/ML
INJECTION, SOLUTION INTRAVENOUS PRN
Status: DISCONTINUED | OUTPATIENT
Start: 2018-07-30 | End: 2018-07-30 | Stop reason: SDUPTHER

## 2018-07-30 RX ORDER — FENTANYL CITRATE 50 UG/ML
25 INJECTION, SOLUTION INTRAMUSCULAR; INTRAVENOUS EVERY 5 MIN PRN
Status: DISCONTINUED | OUTPATIENT
Start: 2018-07-30 | End: 2018-07-30 | Stop reason: HOSPADM

## 2018-07-30 RX ORDER — PROPOFOL 10 MG/ML
INJECTION, EMULSION INTRAVENOUS PRN
Status: DISCONTINUED | OUTPATIENT
Start: 2018-07-30 | End: 2018-07-30 | Stop reason: SDUPTHER

## 2018-07-30 RX ORDER — GLYCOPYRROLATE 1 MG/5 ML
SYRINGE (ML) INTRAVENOUS PRN
Status: DISCONTINUED | OUTPATIENT
Start: 2018-07-30 | End: 2018-07-30 | Stop reason: SDUPTHER

## 2018-07-30 RX ORDER — MAGNESIUM HYDROXIDE 1200 MG/15ML
LIQUID ORAL CONTINUOUS PRN
Status: COMPLETED | OUTPATIENT
Start: 2018-07-30 | End: 2018-07-30

## 2018-07-30 RX ORDER — FENTANYL CITRATE 50 UG/ML
INJECTION, SOLUTION INTRAMUSCULAR; INTRAVENOUS PRN
Status: DISCONTINUED | OUTPATIENT
Start: 2018-07-30 | End: 2018-07-30 | Stop reason: SDUPTHER

## 2018-07-30 RX ADMIN — TRAZODONE HYDROCHLORIDE 50 MG: 50 TABLET ORAL at 22:35

## 2018-07-30 RX ADMIN — INSULIN LISPRO 2 UNITS: 100 INJECTION, SOLUTION INTRAVENOUS; SUBCUTANEOUS at 23:27

## 2018-07-30 RX ADMIN — Medication 1600 MG: at 09:16

## 2018-07-30 RX ADMIN — INSULIN GLARGINE 50 UNITS: 100 INJECTION, SOLUTION SUBCUTANEOUS at 22:36

## 2018-07-30 RX ADMIN — Medication 10 ML: at 09:18

## 2018-07-30 RX ADMIN — Medication 0.8 MG: at 20:37

## 2018-07-30 RX ADMIN — DOCUSATE SODIUM 100 MG: 100 CAPSULE, LIQUID FILLED ORAL at 22:35

## 2018-07-30 RX ADMIN — METOPROLOL SUCCINATE 100 MG: 100 TABLET, EXTENDED RELEASE ORAL at 09:16

## 2018-07-30 RX ADMIN — LIDOCAINE HYDROCHLORIDE 60 MG: 20 INJECTION, SOLUTION INFILTRATION; PERINEURAL at 19:04

## 2018-07-30 RX ADMIN — ROCURONIUM BROMIDE 40 MG: 10 INJECTION, SOLUTION INTRAVENOUS at 19:18

## 2018-07-30 RX ADMIN — INSULIN GLARGINE 25 UNITS: 100 INJECTION, SOLUTION SUBCUTANEOUS at 09:18

## 2018-07-30 RX ADMIN — PIPERACILLIN SODIUM,TAZOBACTAM SODIUM 4.5 G: 4; .5 INJECTION, POWDER, FOR SOLUTION INTRAVENOUS at 03:08

## 2018-07-30 RX ADMIN — PIPERACILLIN SODIUM AND TAZOBACTAM SODIUM 3.38 G: .375; 3 INJECTION, POWDER, LYOPHILIZED, FOR SOLUTION INTRAVENOUS at 19:05

## 2018-07-30 RX ADMIN — PIPERACILLIN SODIUM,TAZOBACTAM SODIUM 4.5 G: 4; .5 INJECTION, POWDER, FOR SOLUTION INTRAVENOUS at 10:46

## 2018-07-30 RX ADMIN — DAPTOMYCIN 465 MG: 500 INJECTION, POWDER, LYOPHILIZED, FOR SOLUTION INTRAVENOUS at 23:07

## 2018-07-30 RX ADMIN — SUCCINYLCHOLINE CHLORIDE 160 MG: 20 INJECTION, SOLUTION INTRAMUSCULAR; INTRAVENOUS; PARENTERAL at 18:54

## 2018-07-30 RX ADMIN — Medication 1600 MG: at 22:34

## 2018-07-30 RX ADMIN — TORSEMIDE 40 MG: 20 TABLET ORAL at 09:17

## 2018-07-30 RX ADMIN — SODIUM CHLORIDE, SODIUM LACTATE, POTASSIUM CHLORIDE, AND CALCIUM CHLORIDE: 600; 310; 30; 20 INJECTION, SOLUTION INTRAVENOUS at 19:03

## 2018-07-30 RX ADMIN — SENNOSIDES 8.6 MG: 8.6 TABLET, FILM COATED ORAL at 22:35

## 2018-07-30 RX ADMIN — INSULIN LISPRO 3 UNITS: 100 INJECTION, SOLUTION INTRAVENOUS; SUBCUTANEOUS at 09:18

## 2018-07-30 RX ADMIN — PANTOPRAZOLE SODIUM 40 MG: 40 TABLET, DELAYED RELEASE ORAL at 09:17

## 2018-07-30 RX ADMIN — PHENYLEPHRINE HYDROCHLORIDE 80 MCG: 10 INJECTION, SOLUTION INTRAMUSCULAR; INTRAVENOUS; SUBCUTANEOUS at 19:29

## 2018-07-30 RX ADMIN — FLUCONAZOLE 100 MG: 100 TABLET ORAL at 09:17

## 2018-07-30 RX ADMIN — PHENYLEPHRINE HYDROCHLORIDE 80 MCG: 10 INJECTION, SOLUTION INTRAMUSCULAR; INTRAVENOUS; SUBCUTANEOUS at 19:21

## 2018-07-30 RX ADMIN — Medication 1 CONTAINER: at 09:18

## 2018-07-30 RX ADMIN — Medication 3 MG: at 20:38

## 2018-07-30 RX ADMIN — FENTANYL CITRATE 100 MCG: 50 INJECTION INTRAMUSCULAR; INTRAVENOUS at 19:04

## 2018-07-30 RX ADMIN — PIPERACILLIN SODIUM,TAZOBACTAM SODIUM 4.5 G: 4; .5 INJECTION, POWDER, FOR SOLUTION INTRAVENOUS at 17:54

## 2018-07-30 RX ADMIN — FERROUS SULFATE TAB 325 MG (65 MG ELEMENTAL FE) 325 MG: 325 (65 FE) TAB at 09:18

## 2018-07-30 RX ADMIN — DOCUSATE SODIUM 100 MG: 100 CAPSULE, LIQUID FILLED ORAL at 09:17

## 2018-07-30 RX ADMIN — VANCOMYCIN HYDROCHLORIDE 1500 MG: 10 INJECTION, POWDER, LYOPHILIZED, FOR SOLUTION INTRAVENOUS at 14:49

## 2018-07-30 RX ADMIN — PHENYLEPHRINE HYDROCHLORIDE 80 MCG: 10 INJECTION, SOLUTION INTRAMUSCULAR; INTRAVENOUS; SUBCUTANEOUS at 19:15

## 2018-07-30 RX ADMIN — PROPOFOL 200 MG: 10 INJECTION, EMULSION INTRAVENOUS at 19:04

## 2018-07-30 RX ADMIN — ONDANSETRON 4 MG: 2 INJECTION INTRAMUSCULAR; INTRAVENOUS at 20:28

## 2018-07-30 RX ADMIN — OXYCODONE HYDROCHLORIDE 5 MG: 5 TABLET ORAL at 23:26

## 2018-07-30 ASSESSMENT — PAIN DESCRIPTION - PROGRESSION
CLINICAL_PROGRESSION: GRADUALLY IMPROVING
CLINICAL_PROGRESSION: NOT CHANGED

## 2018-07-30 ASSESSMENT — PULMONARY FUNCTION TESTS
PIF_VALUE: 22
PIF_VALUE: 24
PIF_VALUE: 23
PIF_VALUE: 23
PIF_VALUE: 22
PIF_VALUE: 15
PIF_VALUE: 22
PIF_VALUE: 22
PIF_VALUE: 23
PIF_VALUE: 23
PIF_VALUE: 19
PIF_VALUE: 23
PIF_VALUE: 3
PIF_VALUE: 23
PIF_VALUE: 23
PIF_VALUE: 22
PIF_VALUE: 22
PIF_VALUE: 20
PIF_VALUE: 23
PIF_VALUE: 1
PIF_VALUE: 23
PIF_VALUE: 8
PIF_VALUE: 23
PIF_VALUE: 22
PIF_VALUE: 22
PIF_VALUE: 23
PIF_VALUE: 23
PIF_VALUE: 1
PIF_VALUE: 23
PIF_VALUE: 21
PIF_VALUE: 19
PIF_VALUE: 23
PIF_VALUE: 23
PIF_VALUE: 7
PIF_VALUE: 23
PIF_VALUE: 20
PIF_VALUE: 22
PIF_VALUE: 23
PIF_VALUE: 24
PIF_VALUE: 22
PIF_VALUE: 23
PIF_VALUE: 0
PIF_VALUE: 23
PIF_VALUE: 18
PIF_VALUE: 22
PIF_VALUE: 23
PIF_VALUE: 19
PIF_VALUE: 7
PIF_VALUE: 23
PIF_VALUE: 22
PIF_VALUE: 20
PIF_VALUE: 21
PIF_VALUE: 24
PIF_VALUE: 23
PIF_VALUE: 20
PIF_VALUE: 23
PIF_VALUE: 17
PIF_VALUE: 23
PIF_VALUE: 23
PIF_VALUE: 22
PIF_VALUE: 23
PIF_VALUE: 3
PIF_VALUE: 23
PIF_VALUE: 23
PIF_VALUE: 21
PIF_VALUE: 23
PIF_VALUE: 1
PIF_VALUE: 1
PIF_VALUE: 23
PIF_VALUE: 23
PIF_VALUE: 0
PIF_VALUE: 23
PIF_VALUE: 23
PIF_VALUE: 20
PIF_VALUE: 1
PIF_VALUE: 23
PIF_VALUE: 20
PIF_VALUE: 22
PIF_VALUE: 23
PIF_VALUE: 18
PIF_VALUE: 1
PIF_VALUE: 3
PIF_VALUE: 23
PIF_VALUE: 23
PIF_VALUE: 3
PIF_VALUE: 4
PIF_VALUE: 23
PIF_VALUE: 19
PIF_VALUE: 23
PIF_VALUE: 23

## 2018-07-30 ASSESSMENT — PAIN SCALES - GENERAL
PAINLEVEL_OUTOF10: 0
PAINLEVEL_OUTOF10: 2
PAINLEVEL_OUTOF10: 0
PAINLEVEL_OUTOF10: 5
PAINLEVEL_OUTOF10: 6

## 2018-07-30 ASSESSMENT — PAIN DESCRIPTION - FREQUENCY
FREQUENCY: CONTINUOUS
FREQUENCY: CONTINUOUS

## 2018-07-30 ASSESSMENT — PAIN DESCRIPTION - PAIN TYPE
TYPE: ACUTE PAIN
TYPE: ACUTE PAIN

## 2018-07-30 ASSESSMENT — PAIN DESCRIPTION - LOCATION
LOCATION: HEAD;NECK
LOCATION: HEAD

## 2018-07-30 ASSESSMENT — ENCOUNTER SYMPTOMS: SHORTNESS OF BREATH: 1

## 2018-07-30 ASSESSMENT — PAIN DESCRIPTION - DESCRIPTORS
DESCRIPTORS: STABBING;BURNING
DESCRIPTORS: HEADACHE

## 2018-07-30 ASSESSMENT — PAIN DESCRIPTION - ONSET
ONSET: ON-GOING
ONSET: ON-GOING

## 2018-07-30 ASSESSMENT — ACTIVITIES OF DAILY LIVING (ADL): EFFECT OF PAIN ON DAILY ACTIVITIES: DISCOMFORT

## 2018-07-30 NOTE — PLAN OF CARE
Problem: Falls - Risk of:  Goal: Will remain free from falls  Will remain free from falls   Outcome: Ongoing  Patient at risk for falls. Patient resting quietly in bed. Side rails up x 2. Bed locked in lowest position. Bed alarm on. Bedside table and call light within reach. Patient instructed to call for assistance. Patient verbalized understanding. Will continue to monitor. Problem: Risk for Impaired Skin Integrity  Goal: Tissue integrity - skin and mucous membranes  Structural intactness and normal physiological function of skin and  mucous membranes. Outcome: Ongoing  Pt at risk for skin breakdown. Skin assessment complete. No signs of new skin breakdown. Preexisting skin issues and wounds assessed and documented on (see flowsheets). Pt with catheter and ostomy in place. Pt repositioned in bed with pillow support and on specialty air mattress. Dressings on thoracic and ischium wounds changed at 0900 this morning. Will continue to monitor.

## 2018-07-30 NOTE — PROGRESS NOTES
Kermit Mota RN stating wounds all just dressed and dressings appropriate for needs. Confirms [employer]olphin Immersion bed mattress obtained and applied Fri. 7/27 as recommended Thurs. By this CWON.

## 2018-07-30 NOTE — OP NOTE
antibiotics and a wound vac will be applied tomorrow morning. INDICATIONS FOR PROCEDURE: This patient has signs and symptoms clinically  consistent with the above mentioned preoperative diagnosis. Having failed conservative treatment, it was determined that the patient would benefit from surgical intervention. All potential risks, benefits, and complications were discussed with the patient prior to the scheduling of surgery. The patient wished to proceed with surgery, and informed written consent was obtained. DETAILS OF PROCEDURE: The patient was brought from the pre-operative area and placed on the operating table in the supine position. The right  lower extremity was then scrubbed, prepped, and draped in the usual sterile fashion. Attention was then directed towards the open wound on the right foot near the 4th metatarsal extending proximally to the posterior heel and distally to the 5th toe. The wound was noted to be very fibrotic and necrotic with purulent drainage noted, thus necessitating the need for wound debridement of soft tissue and infected bone. Using a #15 blade, a full thickness modified racket type incision was made on the dorsal aspect of the 4th metatarsal and extended to encompass the 4th and 5th digit. The incision was carried down through the subcutaneous tissues using sharp dissection. All bleeders were electrocauterized along the way. The 4th and 5th metatarsal phalangeal joint was identified and attention directed towards removal of the 4th and 5th digit. Its soft tissue attachments were severed medially, laterally, dorsally, and plantarly using sharp dissection and the 4th 5th digit was disarticulated and passed from the operating field and placed on the back table. Next, the deep fascial and capsular layer of the 4th metatarsal was incised giving full access to the 4th metatarsal extending proximally to the cuboid. The deep capsular layer was reflected using sharp dissection. Using sharp dissection, the 4th metatarsal along with the cuboid bone were disarticulated and passed from the operating field and placed on the back table and sent to pathology and Microbiology for further review. Upon grasping the resected metatarsal head to remove it from the field, it was easily punctured and crumbled in the operative site. The cuboid was also noted to be easily punctured and crumbled. This was clearly necrotic bone where the integrity of the cortex and medullary canal was compromised. Next, using sharp dissection with a #15 blade the 3rd metatarsal deep fascial and capsular layer was incised giving full access to the 3rd metatarsal extending proximally to the lateral cuneiform. Using sharp dissection, the 3rd metatarsal along with the lateral cuneiform bone were disarticulated and passed from the operating field and placed on the back table and sent to pathology and Microbiology for further review. Attention was then directed towards the remaining devitalized soft tissue. The remaining necrotic tissue was then removed using sharp dissection until good bleeding healthy tissue was noted. Attention was then directed towards irrigation. Using a pulse irrigation system, the surgical site was irrigated using approximately 3 L of normal saline. After copious irrigation the wound was re-inspected and noted to be healthy with good bleeding tissue noted. Hemostasis was achieved with a combination of stick ties and electrocautery. Once hemostasis was achieved it was decided to pack the wound open with Iodoform packing and a wound vac would be applied the following day on the floors. Retention sutures where then placed in the wound using 3-0 Nylon. A sterile post-op dressing was applied to the right foot using 4 x 4, 4 x 8 gauze pads, ABD pads, Kerlix and gentle ace compression.      The patient tolerated the procedure and anesthesia well and was transported from the operating room to the PACU with
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
## 2018-07-30 NOTE — ANESTHESIA PRE PROCEDURE
Department of Anesthesiology  Preprocedure Note       Name:  Dylan Miller   Age:  50 y.o.  :  1965                                          MRN:  [id number]         Date:  2018      Surgeon: Shayla Ruiz):  Jefferson Davis DPM    Procedure: Procedure(s):  INCISION AND DRAINAGE WITH DELAYED PRIMARY CLOSURE, RIGHT FOOT, SPLIT THICKNESS SKIN GRAFT, SPLIT THICKNESS SKIN GRAFT, LEFT HEEL, APPLICATION OF TOTAL CONTACT CAST, BILATERAL,  APPLICATION OF WOUND VAC DRESSING, BILATERAL HEEL, MULTIPLE FOOT WOUNDS BILATERAL    Medications prior to admission:   Prior to Admission medications    Medication Sig Start Date End Date Taking? Authorizing Provider   fluconazole (DIFLUCAN) 100 MG tablet Take 2 tablets today, then 1 tablet daily until they are gone. 18  Yes Miladys Sanchez MD   promethazine (PHENERGAN) 25 MG tablet Take 1 tablet by mouth every 6 hours as needed for Nausea 18  Yes Miladys Sanchez MD   magnesium oxide (MAG-OX) 400 (241.3 Mg) MG TABS tablet TAKE FOUR TABLETS BY MOUTH EVERY MORNING AND TAKE FIVE TABLETS EVERY EVENING. 18  Yes Melinda Cadena MD   Insulin Syringe-Needle U-100 ([employer] INSULIN SYRINGE) 31G X /16\" 1 ML MISC Use daily.   DX: E11.9 18  Yes Melinda Cadena MD   LANTUS 100 UNIT/ML injection vial INJECT 50 UNITS INTO THE SKIN TWO TIMES A DAY 18  Yes Melinda Cadena MD   torsemide (DEMADEX) 20 MG tablet Take 40 mg by mouth daily   Yes Historical Provider, MD   ferrous sulfate 325 (65 Fe) MG tablet once daily and then 2 tabs at bedtime 18  Yes eMlinda Cadena MD   clopidogrel (PLAVIX) 75 MG tablet TAKE ONE TABLET BY MOUTH DAILY 18  Yes Melinda Cadena MD   Insulin Lispro (HUMALOG SC) Inject into the skin 4 times daily (before meals and nightly) Sliding scale   Yes Historical Provider, MD   traZODone (DESYREL) 50 MG tablet TAKE ONE- HALF (1/2) TABLET BY MOUTH ONCE NIGHTLY 18  Yes Berkley Marie MD   rosuvastatin (CRESTOR) 20 MG tablet Take 20 mg by mouth daily 1/18/18  Yes Historical Provider, MD   nitroGLYCERIN (NITROSTAT) 0.4 MG SL tablet up to max of 3 total doses. If no relief after 1 dose, call 911. 1/11/18  Yes DARIAN Lucas CNP   metoprolol succinate (TOPROL XL) 100 MG extended release tablet Take 1 tablet by mouth daily 1/11/18  Yes DARIAN Lucas CNP   DOCQLACE 100 MG capsule TAKE TWO CAPSULES BY MOUTH DAILY 12/31/17  Yes Robe Barone MD   pantoprazole (PROTONIX) 40 MG tablet TAKE ONE TABLET BY MOUTH DAILY 11/21/17  Yes Yomaira Singh MD   FREESTYLE LITE strip USE TO TEST 5 TIMES DAILY 11/17/17  Yes Yomaira Singh MD   aspirin 81 MG tablet Take 81 mg by mouth daily 10/16/17  Yes Historical Provider, MD   apixaban (ELIQUIS) 5 MG TABS tablet Take 5 mg by mouth 2 times daily   Yes Historical Provider, MD   SENNA LAX 8.6 MG tablet TAKE TWO TABLETS BY MOUTH DAILY 9/12/17  Yes Yomaira Singh MD   metFORMIN (GLUCOPHAGE) 1000 MG tablet TAKE ONE TABLET BY MOUTH TWICE A DAY FOR  BLOOD SUGAR 8/14/17  Yes Yomaira Singh MD   COMFORT ASSIST INSULIN SYRINGE 31G X 5/16\" 1 ML MISC USE AS DIRECTED TO INJECT INSULIN FOUR TIMES DAILY 5/11/17  Yes Yomaira Singh MD   nystatin (MYCOSTATIN) 876435 UNIT/GM powder Apply 2-3 times daily as needed, if pelvic rash is very moist. 3/6/17  Yes Robe Barone MD   cyclobenzaprine (FLEXERIL) 10 MG tablet Take 1 tablet by mouth 2 times daily as needed for Muscle spasms 1/19/17  Yes Yomaira Singh MD   oxyCODONE (ROXICODONE) 5 MG immediate release tablet Tab 1 daily as needed only for severe chest pain.  1/19/17  Yes Yomaira Singh MD   B-D ULTRAFINE III SHORT PEN 31G X 8 MM MISC USE FOUR TIMES A DAY OR AS DIRECTED 6/21/16  Yes Yomaira Singh MD   Alcohol Swabs (ALCOHOL WIPES) PADS ONE TOPICALLY FOUR TIMES A DAY 6/21/16  Yes Yomaira Singh MD   Lancets MISC One touch 6/19/14  Yes Yomaira Singh MD       Current medications:    Current Facility-Administered Medications   Medication Dose Route Frequency Provider Last Rate Last Dose    daptomycin (CUBICIN) 465 mg in sodium chloride 0.9 % 50 mL IVPB  6 mg/kg (Ideal) Intravenous Q24H America Colon MD        insulin glargine (LANTUS) injection pen 50 Units  50 Units Subcutaneous BID Izaiah Justin MD        ferrous sulfate tablet 325 mg  325 mg Oral Daily with breakfast Izaiah Justin MD   325 mg at 07/30/18 5617    ferrous sulfate tablet 650 mg  650 mg Oral Lucy Wagner MD   Stopped at 07/30/18 1642    Triad Hydrophilic Wound Dressi PSTE 1 Container  1 Container Apply externally Daily Chin Friend, RN   1 Container at 07/30/18 3930    insulin lispro (HUMALOG) injection pen 0-18 Units  0-18 Units Subcutaneous TID  Briana Turcios MD   Stopped at 07/30/18 1243    insulin lispro (HUMALOG) injection pen 0-9 Units  0-9 Units Subcutaneous Nightly Briana Turcios MD   6 Units at 07/29/18 2323    magnesium oxide (MAG-OX) tablet 1,600 mg  1,600 mg Oral BID Briana MD Tam   1,600 mg at 07/30/18 0916    insulin lispro (HUMALOG) injection pen 15 Units  15 Units Subcutaneous TID  Brooke Quiñones DO   Stopped at 07/30/18 1243    piperacillin-tazobactam (ZOSYN) 4.5 g in dextrose 5 % 100 mL IVPB (mini-bag) - extended infusion  4.5 g Intravenous Q8H Briana Turcios MD 25 mL/hr at 07/30/18 1754 4.5 g at 07/30/18 1754    aspirin chewable tablet 81 mg  81 mg Oral Daily Davin Zhu MD   Stopped at 07/30/18 0916    clopidogrel (PLAVIX) tablet 75 mg  75 mg Oral Daily Davin Sydni Zhu MD   Stopped at 07/30/18 0916    cyclobenzaprine (FLEXERIL) tablet 10 mg  10 mg Oral BID PRN Davin Edinson Christine MD        docusate sodium (COLACE) capsule 100 mg  100 mg Oral BID Davin Zhu MD   100 mg at 07/30/18 0917    metoprolol succinate (TOPROL XL) extended release tablet 100 mg  100 mg Oral Daily Davin Edinson Christine MD   100 mg at 07/30/18 0916    oxyCODONE (ROXICODONE) immediate release tablet 5 mg  5 mg Oral Q4H PRN Davin  SHOULDER SURGERY      rotator cuff, torn bicep    VASECTOMY      VENA CAVA FILTER PLACEMENT  2013    Removed after 3 months       Social History:    Social History   Substance Use Topics    Smoking status: Never Smoker    Smokeless tobacco: Never Used    Alcohol use No                                Counseling given: Not Answered      Vital Signs (Current):   Vitals:    07/30/18 0302 07/30/18 0801 07/30/18 1241 07/30/18 1638   BP: 111/71 120/78 118/74 122/72   Pulse: 106 102 98 104   Resp: 16 16 16 16   Temp: 97.8 °F (36.6 °C) 97.2 °F (36.2 °C) 98.2 °F (36.8 °C) 97.9 °F (36.6 °C)   TempSrc: Oral Oral Oral Oral   SpO2: 92% 96% 95% 94%   Weight:       Height:                                                  BP Readings from Last 3 Encounters:   07/30/18 122/72   07/30/18 (!) 78/54   07/26/18 128/75       NPO Status:  0800                                                                               BMI:   Wt Readings from Last 3 Encounters:   07/24/18 252 lb 13.9 oz (114.7 kg)   07/24/18 252 lb 12.8 oz (114.7 kg)   07/19/18 276 lb (125.2 kg)     Body mass index is 34.25 kg/m².     CBC:   Lab Results   Component Value Date    WBC 11.6 07/30/2018    RBC 3.79 07/30/2018    HGB 8.3 07/30/2018    HCT 27.3 07/30/2018    MCV 72.0 07/30/2018    RDW 23.6 07/30/2018     07/30/2018       CMP:   Lab Results   Component Value Date     07/30/2018    K 3.8 07/30/2018    K 3.7 07/25/2018    CL 88 07/30/2018    CO2 30 07/30/2018    BUN 31 07/30/2018    CREATININE 0.9 07/30/2018    GFRAA >60 07/30/2018    GFRAA >60 05/21/2013    AGRATIO 0.7 07/21/2018    LABGLOM >60 07/30/2018    GLUCOSE 159 07/30/2018    PROT 7.7 07/21/2018    PROT 8.0 10/04/2011    CALCIUM 8.9 07/30/2018    BILITOT 0.4 07/21/2018    ALKPHOS 114 07/21/2018    AST 11 07/21/2018    ALT 6 07/21/2018       POC Tests:   Recent Labs      07/30/18   1630   POCGLU  158*       Coags:   Lab Results   Component Value Date    PROTIME 14.1 07/30/2018    INR

## 2018-07-30 NOTE — CONSULTS
(DESYREL) tablet 50 mg, 50 mg, Oral, Nightly  sodium chloride flush 0.9 % injection 10 mL, 10 mL, Intravenous, 2 times per day  sodium chloride flush 0.9 % injection 10 mL, 10 mL, Intravenous, PRN  magnesium hydroxide (MILK OF MAGNESIA) 400 MG/5ML suspension 30 mL, 30 mL, Oral, Daily PRN  ondansetron (ZOFRAN) injection 4 mg, 4 mg, Intravenous, Q6H PRN  glucose (GLUTOSE) 40 % oral gel 15 g, 15 g, Oral, PRN  dextrose 50 % solution 12.5 g, 12.5 g, Intravenous, PRN  glucagon (rDNA) injection 1 mg, 1 mg, Intramuscular, PRN  dextrose 5 % solution, 100 mL/hr, Intravenous, PRN  fluconazole (DIFLUCAN) tablet 100 mg, 100 mg, Oral, Daily    Allergies:  Benadryl [diphenhydramine hcl]; Bactrim [sulfamethoxazole-trimethoprim]; Statins [statins]; Cephalexin; Morphine; Penicillins; and Sulfa antibiotics    Social History:    Patient currently lives with family  (wife, and children)    Review of Systems -   General ROS: negative  Psychological ROS: negative  ENT ROS: negative  Hematological and Lymphatic ROS: negative  Respiratory ROS: negative  Cardiovascular ROS: negative  Gastrointestinal ROS: positive for - ostomy   Genito-Urinary ROS: positive for - straight cath   Musculoskeletal ROS: positive for - paraplegia and wounds   Neurological ROS: negative      Objective:        PHYSICAL EXAM:    General appearance: alert, appears stated age, cooperative and no distress  Head: Normocephalic, without obvious abnormality, atraumatic  Eyes: negative findings: lids and lashes normal and conjunctivae and sclerae normal  Neck: no JVD and supple, symmetrical, trachea midline  Lungs: RRR WNL, no signs of respiratory distress at this time  Abdomen: soft, ostomy present  Neurologic: Grossly normal    Palliative Performance Scale:  60% ? Ambulation reduced; Significant disease; Can't do hobbies/housework; intake normal   or reduced; occasional assist; LOC full/confusion  50% ? Mainly sit/lie;  Extensive disease; Can't do any work; Considerable

## 2018-07-31 VITALS
DIASTOLIC BLOOD PRESSURE: 73 MMHG | RESPIRATION RATE: 16 BRPM | TEMPERATURE: 98 F | SYSTOLIC BLOOD PRESSURE: 113 MMHG | OXYGEN SATURATION: 96 % | WEIGHT: 252.87 LBS | HEART RATE: 90 BPM | BODY MASS INDEX: 34.25 KG/M2 | HEIGHT: 72 IN

## 2018-07-31 PROBLEM — Z16.24 INFECTION WITH PSEUDOMONAS AERUGINOSA RESISTANT TO MULTIPLE DRUGS: Status: ACTIVE | Noted: 2018-07-31

## 2018-07-31 PROBLEM — I26.99 PULMONARY EMBOLISM WITHOUT ACUTE COR PULMONALE (HCC): Status: ACTIVE | Noted: 2018-07-31

## 2018-07-31 PROBLEM — A49.8 INFECTION WITH PSEUDOMONAS AERUGINOSA RESISTANT TO MULTIPLE DRUGS: Status: ACTIVE | Noted: 2018-07-31

## 2018-07-31 LAB
ALBUMIN SERPL-MCNC: 3.2 G/DL (ref 3.4–5)
ANAEROBIC CULTURE: ABNORMAL
ANION GAP SERPL CALCULATED.3IONS-SCNC: 11 MMOL/L (ref 3–16)
BASOPHILS ABSOLUTE: 0.1 K/UL (ref 0–0.2)
BASOPHILS RELATIVE PERCENT: 0.8 %
BUN BLDV-MCNC: 31 MG/DL (ref 7–20)
CALCIUM SERPL-MCNC: 9.1 MG/DL (ref 8.3–10.6)
CHLORIDE BLD-SCNC: 91 MMOL/L (ref 99–110)
CO2: 30 MMOL/L (ref 21–32)
CREAT SERPL-MCNC: 1 MG/DL (ref 0.9–1.3)
CULTURE SURGICAL: ABNORMAL
EOSINOPHILS ABSOLUTE: 0.3 K/UL (ref 0–0.6)
EOSINOPHILS RELATIVE PERCENT: 1.8 %
GFR AFRICAN AMERICAN: >60
GFR NON-AFRICAN AMERICAN: >60
GLUCOSE BLD-MCNC: 175 MG/DL (ref 70–99)
GLUCOSE BLD-MCNC: 201 MG/DL (ref 70–99)
GLUCOSE BLD-MCNC: 53 MG/DL (ref 70–99)
GLUCOSE BLD-MCNC: 64 MG/DL (ref 70–99)
GRAM STAIN RESULT: ABNORMAL
HCT VFR BLD CALC: 26.4 % (ref 40.5–52.5)
HEMOGLOBIN: 8 G/DL (ref 13.5–17.5)
INR BLD: 1.19 (ref 0.86–1.14)
LYMPHOCYTES ABSOLUTE: 1 K/UL (ref 1–5.1)
LYMPHOCYTES RELATIVE PERCENT: 7.2 %
MAGNESIUM: 1.9 MG/DL (ref 1.8–2.4)
MCH RBC QN AUTO: 21.9 PG (ref 26–34)
MCHC RBC AUTO-ENTMCNC: 30.2 G/DL (ref 31–36)
MCV RBC AUTO: 72.6 FL (ref 80–100)
MONOCYTES ABSOLUTE: 1.3 K/UL (ref 0–1.3)
MONOCYTES RELATIVE PERCENT: 9.8 %
NEUTROPHILS ABSOLUTE: 11 K/UL (ref 1.7–7.7)
NEUTROPHILS RELATIVE PERCENT: 80.4 %
ORGANISM: ABNORMAL
PDW BLD-RTO: 23 % (ref 12.4–15.4)
PERFORMED ON: ABNORMAL
PHOSPHORUS: 3.8 MG/DL (ref 2.5–4.9)
PLATELET # BLD: 373 K/UL (ref 135–450)
PMV BLD AUTO: 8.6 FL (ref 5–10.5)
POTASSIUM SERPL-SCNC: 3.8 MMOL/L (ref 3.5–5.1)
PROTHROMBIN TIME: 13.6 SEC (ref 9.8–13)
RBC # BLD: 3.64 M/UL (ref 4.2–5.9)
SODIUM BLD-SCNC: 132 MMOL/L (ref 136–145)
WBC # BLD: 13.7 K/UL (ref 4–11)
WOUND/ABSCESS: ABNORMAL

## 2018-07-31 PROCEDURE — 80069 RENAL FUNCTION PANEL: CPT

## 2018-07-31 PROCEDURE — 2580000003 HC RX 258: Performed by: STUDENT IN AN ORGANIZED HEALTH CARE EDUCATION/TRAINING PROGRAM

## 2018-07-31 PROCEDURE — 2580000003 HC RX 258: Performed by: INTERNAL MEDICINE

## 2018-07-31 PROCEDURE — 6360000002 HC RX W HCPCS: Performed by: INTERNAL MEDICINE

## 2018-07-31 PROCEDURE — 83735 ASSAY OF MAGNESIUM: CPT

## 2018-07-31 PROCEDURE — 6370000000 HC RX 637 (ALT 250 FOR IP): Performed by: STUDENT IN AN ORGANIZED HEALTH CARE EDUCATION/TRAINING PROGRAM

## 2018-07-31 PROCEDURE — 6370000000 HC RX 637 (ALT 250 FOR IP): Performed by: INTERNAL MEDICINE

## 2018-07-31 PROCEDURE — 85610 PROTHROMBIN TIME: CPT

## 2018-07-31 PROCEDURE — 99233 SBSQ HOSP IP/OBS HIGH 50: CPT | Performed by: INTERNAL MEDICINE

## 2018-07-31 PROCEDURE — 2500000003 HC RX 250 WO HCPCS

## 2018-07-31 PROCEDURE — 85025 COMPLETE CBC W/AUTO DIFF WBC: CPT

## 2018-07-31 PROCEDURE — 6360000002 HC RX W HCPCS: Performed by: STUDENT IN AN ORGANIZED HEALTH CARE EDUCATION/TRAINING PROGRAM

## 2018-07-31 RX ORDER — WARFARIN SODIUM 5 MG/1
5 TABLET ORAL DAILY
Status: DISCONTINUED | OUTPATIENT
Start: 2018-07-31 | End: 2018-07-31 | Stop reason: HOSPADM

## 2018-07-31 RX ORDER — WARFARIN SODIUM 5 MG/1
5 TABLET ORAL DAILY
Qty: 30 TABLET | Refills: 0 | Status: SHIPPED | OUTPATIENT
Start: 2018-07-31 | End: 2018-08-15

## 2018-07-31 RX ORDER — CLOPIDOGREL BISULFATE 75 MG/1
75 TABLET ORAL DAILY
Qty: 30 TABLET | Refills: 1 | Status: SHIPPED | OUTPATIENT
Start: 2018-08-01 | End: 2019-02-22 | Stop reason: SDUPTHER

## 2018-07-31 RX ADMIN — INSULIN GLARGINE 50 UNITS: 100 INJECTION, SOLUTION SUBCUTANEOUS at 09:07

## 2018-07-31 RX ADMIN — DAPTOMYCIN 465 MG: 500 INJECTION, POWDER, LYOPHILIZED, FOR SOLUTION INTRAVENOUS at 13:47

## 2018-07-31 RX ADMIN — INSULIN LISPRO 6 UNITS: 100 INJECTION, SOLUTION INTRAVENOUS; SUBCUTANEOUS at 17:52

## 2018-07-31 RX ADMIN — PIPERACILLIN SODIUM,TAZOBACTAM SODIUM 4.5 G: 4; .5 INJECTION, POWDER, FOR SOLUTION INTRAVENOUS at 01:46

## 2018-07-31 RX ADMIN — Medication 1600 MG: at 09:05

## 2018-07-31 RX ADMIN — INSULIN LISPRO 3 UNITS: 100 INJECTION, SOLUTION INTRAVENOUS; SUBCUTANEOUS at 11:50

## 2018-07-31 RX ADMIN — ASPIRIN 81 MG 81 MG: 81 TABLET ORAL at 09:05

## 2018-07-31 RX ADMIN — Medication 10 ML: at 09:07

## 2018-07-31 RX ADMIN — FERROUS SULFATE TAB 325 MG (65 MG ELEMENTAL FE) 325 MG: 325 (65 FE) TAB at 09:06

## 2018-07-31 RX ADMIN — CLOPIDOGREL BISULFATE 75 MG: 75 TABLET ORAL at 09:06

## 2018-07-31 RX ADMIN — TORSEMIDE 40 MG: 20 TABLET ORAL at 09:06

## 2018-07-31 RX ADMIN — Medication 1 CONTAINER: at 13:47

## 2018-07-31 RX ADMIN — DOCUSATE SODIUM 100 MG: 100 CAPSULE, LIQUID FILLED ORAL at 09:06

## 2018-07-31 RX ADMIN — PANTOPRAZOLE SODIUM 40 MG: 40 TABLET, DELAYED RELEASE ORAL at 09:06

## 2018-07-31 RX ADMIN — METOPROLOL SUCCINATE 100 MG: 100 TABLET, EXTENDED RELEASE ORAL at 09:06

## 2018-07-31 RX ADMIN — PIPERACILLIN SODIUM,TAZOBACTAM SODIUM 4.5 G: 4; .5 INJECTION, POWDER, FOR SOLUTION INTRAVENOUS at 10:25

## 2018-07-31 RX ADMIN — ENOXAPARIN SODIUM 120 MG: 120 INJECTION SUBCUTANEOUS at 13:46

## 2018-07-31 RX ADMIN — FLUCONAZOLE 100 MG: 100 TABLET ORAL at 09:06

## 2018-07-31 ASSESSMENT — ACTIVITIES OF DAILY LIVING (ADL)
EFFECT OF PAIN ON DAILY ACTIVITIES: DISCOMFORT

## 2018-07-31 ASSESSMENT — PAIN SCALES - GENERAL
PAINLEVEL_OUTOF10: 5
PAINLEVEL_OUTOF10: 3
PAINLEVEL_OUTOF10: 4
PAINLEVEL_OUTOF10: 5

## 2018-07-31 ASSESSMENT — PAIN DESCRIPTION - ONSET
ONSET: ON-GOING

## 2018-07-31 ASSESSMENT — PAIN DESCRIPTION - DESCRIPTORS
DESCRIPTORS: SORE
DESCRIPTORS: SORE
DESCRIPTORS: DULL;BURNING

## 2018-07-31 ASSESSMENT — PAIN DESCRIPTION - PROGRESSION
CLINICAL_PROGRESSION: GRADUALLY IMPROVING
CLINICAL_PROGRESSION: NOT CHANGED
CLINICAL_PROGRESSION: NOT CHANGED

## 2018-07-31 ASSESSMENT — PAIN DESCRIPTION - PAIN TYPE
TYPE: ACUTE PAIN

## 2018-07-31 ASSESSMENT — PAIN DESCRIPTION - LOCATION
LOCATION: NECK

## 2018-07-31 ASSESSMENT — PAIN DESCRIPTION - FREQUENCY
FREQUENCY: CONTINUOUS

## 2018-07-31 NOTE — BRIEF OP NOTE
Brief Postoperative Note  ______________________________________________________________    Patient: Saadia Mott  YOB: 1965  MRN: [id number]  Date of Procedure: 7/30/2018    Pre-Op Diagnosis: OSTEOMYELITIS RIGHT AND LEFT FOOT/ANKLE, STAGE 3 PRESSURE ULCER LEFT HEEL CHRONIC ULCER HEEL, MID FOOT WITH FAT LAYER EXPOSED RIGHT    Post-Op Diagnosis: Same       Procedure(s):  INCISION AND DRAINAGE WITH DELAYED PRIMARY CLOSURE and, SPLIT THICKNESS SKIN GRAFT TO RIGHT HEEL, SPLIT THICKNESS SKIN GRAFT TO LEFT HEEL, APPLICATION OF TOTAL CONTACT CAST, BILATERAL,  APPLICATION OF WOUND VAC DRESSING, BILATERAL HEEL, MULTIPLE FOOT WOUNDS BILATERAL    Anesthesia: Anesthesia type not filed in the log. Surgeon(s):  Lei Quinn DPM      Staff:  Sc[employer]: Damian Whaley     Assistant(s):Rivka Moran PGY2    Injectables: none     Hemostasis: anatomic dissection    Materials:  3-0 nylon  3-0 vicryl   Wound vac application        Estimated Blood Loss: 636     Complications: None    Specimens: Was Not Obtained    Findings: see dictation     Drains:   Negative Pressure Wound Therapy Back Upper (Active)   $ Standard NPWT >50 sq cm PER TX $ Yes 7/23/2018  4:15 PM   Wound Type Pressure ulcer: Stage IV 7/28/2018  4:19 PM   Unit Type KCI VAC 7/23/2018  4:15 PM   Dressing Type Other (Comment) 7/25/2018  4:21 PM   Number of pieces used 4 7/23/2018  4:15 PM   Cycle Intermittent 7/28/2018  4:19 PM   Target Pressure (mmHg) 140 7/28/2018  4:19 PM   Intensity 1 7/28/2018  4:19 PM   Canister changed? Yes 7/28/2018  4:19 PM   Dressing Status Clean;Dry; Intact 7/28/2018  4:19 PM   Dressing Changed Changed/New 7/28/2018  4:19 PM   Drainage Amount Small 7/28/2018  4:19 PM   Drainage Description Serosanguinous 7/28/2018  4:19 PM   Dressing Change Due 07/02/18 7/28/2018  4:19 PM   Wound Assessment Red; Other (Comment) 7/28/2018  4:19 PM   Chetna-wound Assessment Intact; Pink 7/28/2018  4:19 PM   Odor None 7/28/2018  4:19 PM Negative Pressure Wound Therapy Knee Right (Active)       Colostomy LLQ (Active)   Stomal Appliance 1 piece 7/28/2018 11:31 PM   Stoma  Assessment ADAM; Other (Comment) 7/28/2018  4:19 PM   Stool Appearance Soft 7/30/2018  4:38 PM   Stool Color Brown 7/30/2018  4:38 PM   Stool Amount Medium 7/30/2018  4:38 PM   Output (mL) 300 ml 7/29/2018  2:11 PM       Colostomy LLQ (Active)       Urethral Catheter 16 fr (Active)   Catheter Indications Stage III or IV perineal and sacral wound OR full thickness perineal/lower extremity burns in incontinent patients 7/30/2018  4:38 PM   Site Assessment No urethral drainage 7/30/2018  4:38 PM   Urine Color Yellow 7/30/2018  4:38 PM   Urine Appearance Clear 7/30/2018  4:38 PM   Urine Odor Fruity 7/28/2018  6:30 AM   Output (mL) 600 mL 7/30/2018  2:49 PM         DISPO: s/p I&D with split thickness skin graft to b/l heels, application of wound vac b/l and TCC b/l. F/u with ID recs on abx prior to discharge. Patient OK for discharge tomorrow.      Pierre Martines DPM  Date: 7/30/2018  Time: 8:58 PM

## 2018-07-31 NOTE — CARE COORDINATION
CRUZITO met with pt at bedside with CRUZITO and RN manager,  He is planning d/c today if IV atbx are determined by ID:  He was with [facility] but now with [facility] , and they are aware of his poss  D/C today . Misa Lane from [facility] here to run benefits as Dr Jada Hastings updated rec: And he is covered at 100%. CRUZITO perfect served Dr Lisa Bartholomew to confirm if pt stable for d/c today. Pt requested irstcare transport and 1700 ambulance is arranged , Con to follow .      Electronically signed by Padilla Elliott RN on 7/31/2018 at 1:37 PM  [phone]  Case Management

## 2018-07-31 NOTE — PROGRESS NOTES
Podiatric Surgery Daily Progress Note  Tanya Essex  Subjective :   Patient s/p I&D with delayed primary closure and STSG to right and STSG to left with wound vac and TCC b/l  seen and examined this am at the bedside. Patient denies any acute overnight events. Patient denies N/V/F/C/SOB. Patient denies calf pain, thigh pain, chest pain. Review of Systems: A 12 point review of symptoms is unremarkable with the exception of the chief complaint. Patient specifically denies nausea, fever, vomiting, chills, shortness of breath, chest pain, abdominal pain, constipation or difficulty urinating. Objective     /73   Pulse 90   Temp 98 °F (36.7 °C) (Oral)   Resp 16   Ht 6' 0.05\" (1.83 m)   Wt 252 lb 13.9 oz (114.7 kg)   SpO2 96%   BMI 34.25 kg/m²      I/O:    Intake/Output Summary (Last 24 hours) at 07/31/18 0937  Last data filed at 07/31/18 1248   Gross per 24 hour   Intake             1085 ml   Output             1700 ml   Net             -615 ml              Wt Readings from Last 3 Encounters:   07/24/18 252 lb 13.9 oz (114.7 kg)   07/24/18 252 lb 12.8 oz (114.7 kg)   07/19/18 276 lb (125.2 kg)       LABS:    Recent Labs      07/30/18   0630  07/30/18   2328  07/31/18   0630   WBC  11.6*   --   13.7*   HGB  8.3*  8.2*  8.0*   HCT  27.3*  26.9*  26.4*   PLT  327   --   373        Recent Labs      07/31/18   0630   NA  132*   K  3.8   CL  91*   CO2  30   PHOS  3.8   BUN  31*   CREATININE  1.0        Recent Labs      07/30/18   0630  07/31/18   0630   INR  1.24*  1.19*           LOWER EXTREMITY EXAMINATION    Dressing to b/l LE intact. No strikethrough noted to the external dressing. Scant drainage noted to the internal layers of the dressing to the distal lateral left foot. DERMATOLOGIC:     B/l wound vac with TCC left intact. Minimal drainage noted to Wound vac. IMPRESSION/RECOMMENDATIONS:       1.  S/p I&D with delayed primary closure and STSG to right and STSG to left with wound vac and

## 2018-07-31 NOTE — CARE COORDINATION
2018  [facility] Case Management Department    Patient: Ronald Figueroa  MRN: [id number] / : 1965  ACCT: Tommie Kris [de-identified]     Emergency Contacts  Contact 1: Name: Sandhya Emery  Contact 1: Number: [phone]  Contact 1: Relationship: wife  Healthcare Agent Appointed: [address] [place] [address] Agent's Name: [address] [place] [address] Agent's Phone Number: [phone]    Admission Documentation  Attending Provider: Saud Arellano MD  Admit date/time: 2018  5:49 PM  Status: Inpatient  Diagnosis: Skin ulcers of foot, bilateral ([address]     Readmission within last 30 days:  no     Living Situation  Discharge Planning  Living Arrangements: Spouse/Significant Other, Family Members  Support Systems: Family Members, Home Care Staff  Potential Assistance Needed: Home Care  Type of Home Care Services: [address]  Patient expects to be discharged to[de-identified] home  Expected Discharge Date: 18    Service Assessment:       Values / Beliefs  Do you have any ethnic, cultural, sacramental, or spiritual Jew needs you would like us to be aware of while you are in the hospital?: No    Advance Directives (For Healthcare)  Pre-existing DNR Comfort Care/DNR Arrest/DNI Order: No  Healthcare Directive: Yes, patient has an advance directive for healthcare treatment  Type of Healthcare Directive: Durable power of  for health care  Copy in Chart: Yes, copy in chart  Chart Copy Status [de-identified] [id number] [place] [address] Appointed: [address] [place] [address] Agent's Name: [address] [place] [address] Agent's Phone Number: [phone]  If you are unable to speak for yourself, does your Healthcare Agent or Legal Spokesperson know your healthcare wishes?: Yes                        Pharmacy:    Potential Assistance Purchasing Medications:  No  Does patient want to participate in local refill/meds to beds program?: Yes    Notification completed in BERNARDO/PAS?  No     Discharge disposition:    Home w/        Discharging to Facility/ [place] [address] , [address] [address]       Phone: [phone]       Fax: [phone]        Wife Ia an Rn and also has aides 3x a week from 9 am to 8 pm.     Dialysis Facility (if applicable)      [facility] [address]       Phone: [phone]       Fax: [phone]          / signature: Electronically signed by Charlotte Bridges RN on 7/31/18 at 1:42 PM    PHYSICIAN SECTION       Ancillary: CM spoke with Dr Norman Valenzuela and all arrangements are made for discharge they are still working on clarification of blood thinners,  will keep 1700 transport and await d/c order. Sagrario Chung and his family were provided with choice of provider; he and his family are in agreement with the discharge plan.       Care Transitions patient: No    Charlotte Bridges RN  [facility]  Case Management Department  Ph: [phone]

## 2018-07-31 NOTE — PROGRESS NOTES
Patient admitted to PACU. Post op   INCISION AND DRAINAGE WITH DELAYED PRIMARY CLOSURE, RIGHT FOOT, SPLIT THICKNESS SKIN GRAFT, SPLIT THICKNESS SKIN GRAFT, LEFT HEEL, APPLICATION OF TOTAL CONTACT CAST, BILATERAL,  APPLICATION OF WOUND VAC DRESSING, BILATERAL HEEL, MULTIPLE FOOT WOUNDS BILATERAL (Bilateral ) Diagnosis: (OSTEOMYELITIS RIGHT AND LEFT FOOT/ANKLE, STAGE 3 PRESSURE ULCER LEFT HEEL CHRONIC ULCER HEEL, MID FOOT WITH FAT LAYER EXPOSED RIGHT)   Surgeon: Conrad Saleh DPM Responsible      Report received from anesthesia personnel- no problems noted during the case. Patient drowsy post op - no signs of discomfort noted. Bilateral feet with large gauze stocking net dressings in place connected to wound vac both feet- on. VS stable.

## 2018-07-31 NOTE — PROGRESS NOTES
Patient wakes easily when spoken to. Denies any pain or needs at this time. Post op blood sugar 146. VS stable.

## 2018-07-31 NOTE — PROGRESS NOTES
PACU Transfer Note    Vitals:    07/30/18 2115   BP: 129/72   Pulse: 98   Resp: 27   Temp: 98.5 °F (36.9 °C)   SpO2: 100%       In: 455 [I.V.:455]  Out: 500 [Urine:400]    Pain assessment:  VS stable. Patient wakes when spoken to- no complaints of pain or needs. Report called to GI Track. No family here at this time. Patient to room #6301 via bed.   Pain Level: 0    Report given to Receiving unit RN.    7/30/2018 9:35 PM
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
## 2018-08-01 ENCOUNTER — TELEPHONE (OUTPATIENT)
Dept: INTERNAL MEDICINE CLINIC | Age: 53
End: 2018-08-01

## 2018-08-01 ENCOUNTER — HOSPITAL ENCOUNTER (OUTPATIENT)
Dept: WOUND CARE | Age: 53
Discharge: HOME | End: 2018-08-01
Payer: MEDICAID

## 2018-08-01 LAB
EKG ATRIAL RATE: 79 BPM
EKG DIAGNOSIS: NORMAL
EKG P AXIS: 60 DEGREES
EKG P-R INTERVAL: 168 MS
EKG Q-T INTERVAL: 436 MS
EKG QRS DURATION: 88 MS
EKG QTC CALCULATION (BAZETT): 499 MS
EKG R AXIS: -19 DEGREES
EKG T AXIS: 43 DEGREES
EKG VENTRICULAR RATE: 79 BPM

## 2018-08-01 NOTE — TELEPHONE ENCOUNTER
----- Message from Emily Ruiz MD sent at 8/1/2018 12:36 PM EDT -----  Contact: pt called [phone]  Yes    ----- Message -----  From: Saranya Linder  Sent: 8/1/2018  10:50 AM  To: Emily Ruiz MD    Pt has to cancel his hospital follow up appointment tomorrow 8/2. He just had surgery and is on bed rest this week. Can he reschedule for later next week, since that's when he'll be able to use a wheelchair? Could you let the patient know at [phone].  Last lenka 7/19. sb

## 2018-08-02 ENCOUNTER — TELEPHONE (OUTPATIENT)
Dept: INTERNAL MEDICINE CLINIC | Age: 53
End: 2018-08-02

## 2018-08-03 ENCOUNTER — TELEPHONE (OUTPATIENT)
Dept: INTERNAL MEDICINE CLINIC | Age: 53
End: 2018-08-03

## 2018-08-03 ENCOUNTER — ANTI-COAG VISIT (OUTPATIENT)
Dept: INTERNAL MEDICINE CLINIC | Age: 53
End: 2018-08-03

## 2018-08-03 DIAGNOSIS — I26.99 PULMONARY EMBOLISM ON RIGHT (HCC): Primary | ICD-10-CM

## 2018-08-03 LAB — INR BLD: 1.1

## 2018-08-03 NOTE — TELEPHONE ENCOUNTER
----- Message from Emily Ruiz MD sent at 8/3/2018 12:57 PM EDT -----  Contact: [address] [place] [address] coumadin at 5 mg ,repeat 1 week    ----- Message -----  From: Ramona Sumner  Sent: 8/3/2018  11:25 AM  To: Emily Ruiz MD    INR 1.1    2mg daily

## 2018-08-06 ENCOUNTER — HOSPITAL ENCOUNTER (OUTPATIENT)
Age: 53
Setting detail: SPECIMEN
Discharge: HOME | End: 2018-08-06
Payer: MEDICARE

## 2018-08-06 LAB
ANION GAP SERPL CALCULATED.3IONS-SCNC: 16 MMOL/L (ref 3–16)
BASOPHILS ABSOLUTE: 0.1 K/UL (ref 0–0.2)
BASOPHILS RELATIVE PERCENT: 1.1 %
BUN BLDV-MCNC: 37 MG/DL (ref 7–20)
C-REACTIVE PROTEIN: 46.5 MG/L (ref 0–5.1)
CALCIUM SERPL-MCNC: 9.1 MG/DL (ref 8.3–10.6)
CHLORIDE BLD-SCNC: 90 MMOL/L (ref 99–110)
CO2: 27 MMOL/L (ref 21–32)
CREAT SERPL-MCNC: 0.9 MG/DL (ref 0.9–1.3)
EOSINOPHILS ABSOLUTE: 0.4 K/UL (ref 0–0.6)
EOSINOPHILS RELATIVE PERCENT: 3.8 %
GFR AFRICAN AMERICAN: >60
GFR NON-AFRICAN AMERICAN: >60
GLUCOSE BLD-MCNC: 83 MG/DL (ref 70–99)
HCT VFR BLD CALC: 29.8 % (ref 40.5–52.5)
HEMOGLOBIN: 9.2 G/DL (ref 13.5–17.5)
LYMPHOCYTES ABSOLUTE: 1.6 K/UL (ref 1–5.1)
LYMPHOCYTES RELATIVE PERCENT: 16.9 %
MCH RBC QN AUTO: 22.3 PG (ref 26–34)
MCHC RBC AUTO-ENTMCNC: 30.7 G/DL (ref 31–36)
MCV RBC AUTO: 72.5 FL (ref 80–100)
MONOCYTES ABSOLUTE: 0.9 K/UL (ref 0–1.3)
MONOCYTES RELATIVE PERCENT: 9.1 %
NEUTROPHILS ABSOLUTE: 6.6 K/UL (ref 1.7–7.7)
NEUTROPHILS RELATIVE PERCENT: 69.1 %
PDW BLD-RTO: 23.7 % (ref 12.4–15.4)
PLATELET # BLD: 469 K/UL (ref 135–450)
PMV BLD AUTO: 8.5 FL (ref 5–10.5)
POTASSIUM SERPL-SCNC: 3.8 MMOL/L (ref 3.5–5.1)
RBC # BLD: 4.12 M/UL (ref 4.2–5.9)
SEDIMENTATION RATE, ERYTHROCYTE: 93 MM/HR (ref 0–20)
SODIUM BLD-SCNC: 133 MMOL/L (ref 136–145)
TOTAL CK: 26 U/L (ref 39–308)
WBC # BLD: 9.6 K/UL (ref 4–11)

## 2018-08-06 PROCEDURE — 82550 ASSAY OF CK (CPK): CPT

## 2018-08-06 PROCEDURE — 85025 COMPLETE CBC W/AUTO DIFF WBC: CPT

## 2018-08-06 PROCEDURE — 86140 C-REACTIVE PROTEIN: CPT

## 2018-08-06 PROCEDURE — 80048 BASIC METABOLIC PNL TOTAL CA: CPT

## 2018-08-06 PROCEDURE — 36415 COLL VENOUS BLD VENIPUNCTURE: CPT

## 2018-08-06 PROCEDURE — 85652 RBC SED RATE AUTOMATED: CPT

## 2018-08-07 ENCOUNTER — HOSPITAL ENCOUNTER (OUTPATIENT)
Dept: WOUND CARE | Age: 53
Discharge: HOME | End: 2018-08-07
Payer: MEDICARE

## 2018-08-07 VITALS
RESPIRATION RATE: 18 BRPM | HEART RATE: 71 BPM | BODY MASS INDEX: 34 KG/M2 | DIASTOLIC BLOOD PRESSURE: 60 MMHG | HEIGHT: 72 IN | TEMPERATURE: 97 F | WEIGHT: 251 LBS | SYSTOLIC BLOOD PRESSURE: 93 MMHG

## 2018-08-07 DIAGNOSIS — L89.894 PRESSURE ULCER OF LEFT FOOT, STAGE 4 (HCC): ICD-10-CM

## 2018-08-07 DIAGNOSIS — G47.33 OSA ON CPAP: ICD-10-CM

## 2018-08-07 DIAGNOSIS — M86.671 CHRONIC OSTEOMYELITIS OF RIGHT FOOT (HCC): ICD-10-CM

## 2018-08-07 DIAGNOSIS — Z99.89 OSA ON CPAP: ICD-10-CM

## 2018-08-07 DIAGNOSIS — R51.9 ACUTE INTRACTABLE HEADACHE, UNSPECIFIED HEADACHE TYPE: Primary | ICD-10-CM

## 2018-08-07 LAB
GLUCOSE BLD-MCNC: 100 MG/DL (ref 70–99)
PERFORMED ON: ABNORMAL

## 2018-08-07 PROCEDURE — 97597 DBRDMT OPN WND 1ST 20 CM/<: CPT | Performed by: INTERNAL MEDICINE

## 2018-08-07 PROCEDURE — 97598 DBRDMT OPN WND ADDL 20CM/<: CPT | Performed by: INTERNAL MEDICINE

## 2018-08-07 PROCEDURE — 11045 DBRDMT SUBQ TISS EACH ADDL: CPT | Performed by: INTERNAL MEDICINE

## 2018-08-07 PROCEDURE — 29445 APPL RIGID TOT CNTC LEG CAST: CPT | Performed by: INTERNAL MEDICINE

## 2018-08-07 PROCEDURE — 99215 OFFICE O/P EST HI 40 MIN: CPT | Performed by: INTERNAL MEDICINE

## 2018-08-07 PROCEDURE — 11045 DBRDMT SUBQ TISS EACH ADDL: CPT

## 2018-08-07 PROCEDURE — 29445 APPL RIGID TOT CNTC LEG CAST: CPT

## 2018-08-07 PROCEDURE — 11042 DBRDMT SUBQ TIS 1ST 20SQCM/<: CPT | Performed by: INTERNAL MEDICINE

## 2018-08-07 PROCEDURE — 11042 DBRDMT SUBQ TIS 1ST 20SQCM/<: CPT

## 2018-08-07 PROCEDURE — 97597 DBRDMT OPN WND 1ST 20 CM/<: CPT

## 2018-08-07 PROCEDURE — 97605 NEG PRS WND THER DME<=50SQCM: CPT

## 2018-08-07 PROCEDURE — 97598 DBRDMT OPN WND ADDL 20CM/<: CPT

## 2018-08-07 RX ORDER — LIDOCAINE 40 MG/G
CREAM TOPICAL ONCE
Status: DISCONTINUED | OUTPATIENT
Start: 2018-08-07 | End: 2018-08-08 | Stop reason: HOSPADM

## 2018-08-07 RX ORDER — MEROPENEM 1 G/1
1 INJECTION, POWDER, FOR SOLUTION INTRAVENOUS EVERY 8 HOURS
COMMUNITY
End: 2018-08-25

## 2018-08-07 RX ORDER — DAPTOMYCIN 50 MG/ML
INJECTION, POWDER, LYOPHILIZED, FOR SOLUTION INTRAVENOUS DAILY
COMMUNITY
End: 2018-08-25

## 2018-08-07 ASSESSMENT — PAIN DESCRIPTION - PAIN TYPE: TYPE: ACUTE PAIN

## 2018-08-07 ASSESSMENT — PAIN SCALES - GENERAL: PAINLEVEL_OUTOF10: 6

## 2018-08-07 ASSESSMENT — PAIN DESCRIPTION - LOCATION: LOCATION: HEAD

## 2018-08-08 DIAGNOSIS — K59.01 CONSTIPATION BY DELAYED COLONIC TRANSIT: ICD-10-CM

## 2018-08-08 RX ORDER — DOCUSATE SODIUM 100 MG/1
CAPSULE, LIQUID FILLED ORAL
Qty: 180 CAPSULE | Refills: 0 | Status: SHIPPED | OUTPATIENT
Start: 2018-08-08 | End: 2018-11-12 | Stop reason: SDUPTHER

## 2018-08-08 NOTE — TELEPHONE ENCOUNTER
----- Message from Abhishek Crane MD sent at 8/8/2018 10:11 AM EDT -----  Contact: pt [phone]  Refill daily x 90 days    ----- Message -----  From: Aida Harris  Sent: 8/8/2018   9:52 AM  To: Abhishek Crane MD    Pt needs refill on DOCQLACE 100 MG capsule    Next appt 8/20  Andrew perez   [facility]

## 2018-08-09 DIAGNOSIS — D63.8 ANEMIA OF CHRONIC DISORDER: ICD-10-CM

## 2018-08-09 RX ORDER — FERROUS SULFATE 325(65) MG
TABLET ORAL
Qty: 270 TABLET | Refills: 0 | Status: SHIPPED | OUTPATIENT
Start: 2018-08-09 | End: 2018-11-17 | Stop reason: SDUPTHER

## 2018-08-09 RX ORDER — OXYCODONE HYDROCHLORIDE 5 MG/1
5 CAPSULE ORAL 3 TIMES DAILY PRN
Qty: 20 CAPSULE | Refills: 0 | Status: SHIPPED | OUTPATIENT
Start: 2018-08-09 | End: 2018-08-15 | Stop reason: ALTCHOICE

## 2018-08-12 PROBLEM — L97.529 SKIN ULCERS OF FOOT, BILATERAL (HCC): Status: RESOLVED | Noted: 2018-07-24 | Resolved: 2018-08-12

## 2018-08-12 PROBLEM — R51.9 HEADACHE: Status: ACTIVE | Noted: 2018-08-12

## 2018-08-12 PROBLEM — L97.519 SKIN ULCERS OF FOOT, BILATERAL (HCC): Status: RESOLVED | Noted: 2018-07-24 | Resolved: 2018-08-12

## 2018-08-12 PROBLEM — R06.00 DYSPNEA: Status: RESOLVED | Noted: 2018-01-07 | Resolved: 2018-08-12

## 2018-08-12 PROBLEM — I26.99 PULMONARY EMBOLISM ON RIGHT (HCC): Status: RESOLVED | Noted: 2018-07-21 | Resolved: 2018-08-12

## 2018-08-12 PROBLEM — M86.671 CHRONIC OSTEOMYELITIS OF RIGHT FOOT (HCC): Status: ACTIVE | Noted: 2018-08-12

## 2018-08-12 PROBLEM — I26.99 OTHER PULMONARY EMBOLISM WITHOUT ACUTE COR PULMONALE (HCC): Status: RESOLVED | Noted: 2018-07-21 | Resolved: 2018-08-12

## 2018-08-12 PROBLEM — L03.317 CELLULITIS OF RIGHT BUTTOCK: Status: RESOLVED | Noted: 2018-07-09 | Resolved: 2018-08-12

## 2018-08-12 PROBLEM — L89.894 PRESSURE ULCER OF LEFT FOOT, STAGE 4 (HCC): Status: ACTIVE | Noted: 2018-08-12

## 2018-08-12 PROBLEM — M86.671 CHRONIC OSTEOMYELITIS OF RIGHT FOOT (HCC): Status: RESOLVED | Noted: 2018-08-12 | Resolved: 2018-08-12
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
## 2018-08-13 ENCOUNTER — TELEPHONE (OUTPATIENT)
Dept: INTERNAL MEDICINE CLINIC | Age: 53
End: 2018-08-13

## 2018-08-13 ENCOUNTER — ANTI-COAG VISIT (OUTPATIENT)
Dept: INTERNAL MEDICINE CLINIC | Age: 53
End: 2018-08-13

## 2018-08-13 ENCOUNTER — HOSPITAL ENCOUNTER (OUTPATIENT)
Age: 53
Setting detail: SPECIMEN
Discharge: HOME | End: 2018-08-13
Payer: MEDICARE

## 2018-08-13 LAB
ANION GAP SERPL CALCULATED.3IONS-SCNC: 17 MMOL/L (ref 3–16)
BASOPHILS ABSOLUTE: 0.1 K/UL (ref 0–0.2)
BASOPHILS RELATIVE PERCENT: 1.2 %
BUN BLDV-MCNC: 34 MG/DL (ref 7–20)
CALCIUM SERPL-MCNC: 9.3 MG/DL (ref 8.3–10.6)
CHLORIDE BLD-SCNC: 93 MMOL/L (ref 99–110)
CO2: 27 MMOL/L (ref 21–32)
CREAT SERPL-MCNC: 0.9 MG/DL (ref 0.9–1.3)
EOSINOPHILS ABSOLUTE: 0.3 K/UL (ref 0–0.6)
EOSINOPHILS RELATIVE PERCENT: 3.4 %
GFR AFRICAN AMERICAN: >60
GFR NON-AFRICAN AMERICAN: >60
GLUCOSE BLD-MCNC: 97 MG/DL (ref 70–99)
HCT VFR BLD CALC: 29.4 % (ref 40.5–52.5)
HEMOGLOBIN: 9.2 G/DL (ref 13.5–17.5)
INR BLD: 3
LYMPHOCYTES ABSOLUTE: 1.5 K/UL (ref 1–5.1)
LYMPHOCYTES RELATIVE PERCENT: 19 %
MCH RBC QN AUTO: 22.6 PG (ref 26–34)
MCHC RBC AUTO-ENTMCNC: 31.2 G/DL (ref 31–36)
MCV RBC AUTO: 72.2 FL (ref 80–100)
MONOCYTES ABSOLUTE: 0.5 K/UL (ref 0–1.3)
MONOCYTES RELATIVE PERCENT: 6.8 %
NEUTROPHILS ABSOLUTE: 5.3 K/UL (ref 1.7–7.7)
NEUTROPHILS RELATIVE PERCENT: 69.6 %
PDW BLD-RTO: 23.1 % (ref 12.4–15.4)
PLATELET # BLD: 469 K/UL (ref 135–450)
PMV BLD AUTO: 8.6 FL (ref 5–10.5)
POTASSIUM SERPL-SCNC: 3.8 MMOL/L (ref 3.5–5.1)
RBC # BLD: 4.07 M/UL (ref 4.2–5.9)
SEDIMENTATION RATE, ERYTHROCYTE: 105 MM/HR (ref 0–20)
SODIUM BLD-SCNC: 137 MMOL/L (ref 136–145)
TOTAL CK: 40 U/L (ref 39–308)
WBC # BLD: 7.7 K/UL (ref 4–11)

## 2018-08-13 PROCEDURE — 36415 COLL VENOUS BLD VENIPUNCTURE: CPT

## 2018-08-13 PROCEDURE — 85025 COMPLETE CBC W/AUTO DIFF WBC: CPT

## 2018-08-13 PROCEDURE — 85652 RBC SED RATE AUTOMATED: CPT

## 2018-08-13 PROCEDURE — 82550 ASSAY OF CK (CPK): CPT

## 2018-08-13 PROCEDURE — 80048 BASIC METABOLIC PNL TOTAL CA: CPT

## 2018-08-13 PROCEDURE — 86140 C-REACTIVE PROTEIN: CPT

## 2018-08-13 RX ORDER — WARFARIN SODIUM 2 MG/1
4 TABLET ORAL DAILY
Qty: 180 TABLET | Refills: 0 | Status: SHIPPED | OUTPATIENT
Start: 2018-08-13 | End: 2018-08-15

## 2018-08-13 NOTE — TELEPHONE ENCOUNTER
Per Dr Keyana Vera, patient's home care nurse informed to decrease to 4 mg daily. Recheck in 1 week.

## 2018-08-14 LAB — C-REACTIVE PROTEIN: 72.6 MG/L (ref 0–5.1)

## 2018-08-15 ENCOUNTER — HOSPITAL ENCOUNTER (OUTPATIENT)
Dept: WOUND CARE | Age: 53
Discharge: HOME | End: 2018-08-15
Payer: MEDICARE

## 2018-08-15 VITALS
HEART RATE: 84 BPM | RESPIRATION RATE: 17 BRPM | SYSTOLIC BLOOD PRESSURE: 98 MMHG | BODY MASS INDEX: 33.55 KG/M2 | HEIGHT: 72 IN | DIASTOLIC BLOOD PRESSURE: 64 MMHG | WEIGHT: 247.7 LBS | TEMPERATURE: 97.4 F

## 2018-08-15 PROCEDURE — 99214 OFFICE O/P EST MOD 30 MIN: CPT | Performed by: INTERNAL MEDICINE

## 2018-08-15 PROCEDURE — 97597 DBRDMT OPN WND 1ST 20 CM/<: CPT | Performed by: INTERNAL MEDICINE

## 2018-08-15 PROCEDURE — 97597 DBRDMT OPN WND 1ST 20 CM/<: CPT

## 2018-08-15 PROCEDURE — 17250 CHEM CAUT OF GRANLTJ TISSUE: CPT

## 2018-08-15 PROCEDURE — 97605 NEG PRS WND THER DME<=50SQCM: CPT

## 2018-08-15 PROCEDURE — 97598 DBRDMT OPN WND ADDL 20CM/<: CPT

## 2018-08-15 PROCEDURE — 97598 DBRDMT OPN WND ADDL 20CM/<: CPT | Performed by: INTERNAL MEDICINE

## 2018-08-15 RX ORDER — METOCLOPRAMIDE 10 MG/1
TABLET ORAL
Qty: 20 TABLET | Refills: 0 | Status: SHIPPED | OUTPATIENT
Start: 2018-08-15 | End: 2019-09-04

## 2018-08-15 RX ORDER — LIDOCAINE 40 MG/G
CREAM TOPICAL PRN
Status: DISCONTINUED | OUTPATIENT
Start: 2018-08-15 | End: 2018-08-16 | Stop reason: HOSPADM

## 2018-08-15 RX ORDER — WARFARIN SODIUM 4 MG/1
4 TABLET ORAL DAILY
COMMUNITY
End: 2018-08-22 | Stop reason: ALTCHOICE

## 2018-08-15 ASSESSMENT — PAIN DESCRIPTION - FREQUENCY: FREQUENCY: CONTINUOUS

## 2018-08-15 ASSESSMENT — PAIN SCALES - GENERAL: PAINLEVEL_OUTOF10: 6

## 2018-08-15 ASSESSMENT — PAIN DESCRIPTION - DESCRIPTORS: DESCRIPTORS: CONSTANT;SHOOTING;ACHING

## 2018-08-15 ASSESSMENT — PAIN DESCRIPTION - LOCATION: LOCATION: HEAD

## 2018-08-15 ASSESSMENT — PAIN DESCRIPTION - PAIN TYPE: TYPE: ACUTE PAIN

## 2018-08-15 ASSESSMENT — PAIN DESCRIPTION - ONSET: ONSET: ON-GOING

## 2018-08-15 ASSESSMENT — PAIN DESCRIPTION - ORIENTATION: ORIENTATION: RIGHT

## 2018-08-15 ASSESSMENT — PAIN DESCRIPTION - PROGRESSION: CLINICAL_PROGRESSION: NOT CHANGED

## 2018-08-15 NOTE — PLAN OF CARE
Problem: Wound:  Goal: Will show signs of wound healing; wound closure and no evidence of infection  Will show signs of wound healing; wound closure and no evidence of infection   Outcome: Ongoing  Pt. With [facility] TCC's remain in place to Homar Ayala to assess on Friday. Left thigh graft site healing well, apply PSO & mepilex border. Ischial wound stable, debridement & Ag Nitrate used per MD, Will cont. With current wound care regime. Reinforced importance of staying in bed & limiting time spent in chair, with frequent repositioning from side to side. Pt. States he has spent very little time in chair & reinforced that this is what is needed to assist with healing wound. Hip wound stable, no changes. Thoracic wound stable, debridement per MD & pt. Tolerated well, will stop packing & cont. With NPWT. Pt. States that he stopped his antibiotics for a few days d/t headaches & thinking this could be a side effect of the medication. Dr Petrona Davis assessed pt. & thinks he may be having Migraines, Dr Petrona Davis to call in medication & advised the patient to notify Dr. Tobi Simpson of the new migraines & symptoms. Pt. Will restart antibiotics as ordered today. Dr. Petrona Davis will also order IV fluids through [facility] d/t pt. Also having nausea with the migraines & not eating or drinking much. F/u in [address] [place] [address] in 1 week as ordered. Discharge instructions reviewed with patient, all questions answered, copy given to patient. Dressings were applied to all wounds per M.D. Instructions at this visit.

## 2018-08-17 ENCOUNTER — TELEPHONE (OUTPATIENT)
Dept: INTERNAL MEDICINE CLINIC | Age: 53
End: 2018-08-17

## 2018-08-17 ENCOUNTER — ANTI-COAG VISIT (OUTPATIENT)
Dept: INTERNAL MEDICINE CLINIC | Age: 53
End: 2018-08-17

## 2018-08-17 LAB — INR BLD: 3.4

## 2018-08-17 RX ORDER — WARFARIN SODIUM 3 MG/1
3 TABLET ORAL DAILY
Qty: 30 TABLET | Refills: 2 | Status: SHIPPED | OUTPATIENT
Start: 2018-08-17 | End: 2018-08-22 | Stop reason: ALTCHOICE

## 2018-08-17 NOTE — TELEPHONE ENCOUNTER
----- Message from Emily Ruiz MD sent at 8/17/2018  2:17 PM EDT -----  Contact: [facility] [phone]  High   3 mg   1 week  ----- Message -----  From: Parth Estrella  Sent: 8/17/2018   1:33 PM  To: Emily Ruiz MD    INR: 3.4  PT: 43  Currently taking 4 mg daily.

## 2018-08-20 ENCOUNTER — HOSPITAL ENCOUNTER (OUTPATIENT)
Age: 53
Setting detail: SPECIMEN
Discharge: HOME | End: 2018-08-20
Payer: MEDICARE

## 2018-08-20 ENCOUNTER — OFFICE VISIT (OUTPATIENT)
Dept: INTERNAL MEDICINE CLINIC | Age: 53
End: 2018-08-20

## 2018-08-20 DIAGNOSIS — Z09 HOSPITAL DISCHARGE FOLLOW-UP: Primary | ICD-10-CM

## 2018-08-20 DIAGNOSIS — I25.10 CORONARY ARTERY DISEASE INVOLVING NATIVE CORONARY ARTERY OF NATIVE HEART WITHOUT ANGINA PECTORIS: Chronic | ICD-10-CM

## 2018-08-20 DIAGNOSIS — Z79.4 TYPE 2 DIABETES MELLITUS WITH OTHER SKIN COMPLICATION, WITH LONG-TERM CURRENT USE OF INSULIN (HCC): Chronic | ICD-10-CM

## 2018-08-20 DIAGNOSIS — E11.628 TYPE 2 DIABETES MELLITUS WITH OTHER SKIN COMPLICATION, WITH LONG-TERM CURRENT USE OF INSULIN (HCC): Chronic | ICD-10-CM

## 2018-08-20 DIAGNOSIS — R51.9 FRONTAL HEADACHE: ICD-10-CM

## 2018-08-20 DIAGNOSIS — G82.21 PARAPLEGIA, COMPLETE (HCC): ICD-10-CM

## 2018-08-20 DIAGNOSIS — I50.22 CHRONIC SYSTOLIC HEART FAILURE (HCC): Chronic | ICD-10-CM

## 2018-08-20 LAB
ANION GAP SERPL CALCULATED.3IONS-SCNC: 16 MMOL/L (ref 3–16)
BASOPHILS ABSOLUTE: 0.1 K/UL (ref 0–0.2)
BASOPHILS RELATIVE PERCENT: 1 %
BUN BLDV-MCNC: 33 MG/DL (ref 7–20)
CALCIUM SERPL-MCNC: 8.9 MG/DL (ref 8.3–10.6)
CHLORIDE BLD-SCNC: 94 MMOL/L (ref 99–110)
CO2: 27 MMOL/L (ref 21–32)
CREAT SERPL-MCNC: 1 MG/DL (ref 0.9–1.3)
EOSINOPHILS ABSOLUTE: 0.3 K/UL (ref 0–0.6)
EOSINOPHILS RELATIVE PERCENT: 4.1 %
GFR AFRICAN AMERICAN: >60
GFR NON-AFRICAN AMERICAN: >60
GLUCOSE BLD-MCNC: 157 MG/DL (ref 70–99)
HCT VFR BLD CALC: 29.8 % (ref 40.5–52.5)
HEMOGLOBIN: 9.3 G/DL (ref 13.5–17.5)
LYMPHOCYTES ABSOLUTE: 1.4 K/UL (ref 1–5.1)
LYMPHOCYTES RELATIVE PERCENT: 17.2 %
MCH RBC QN AUTO: 22.5 PG (ref 26–34)
MCHC RBC AUTO-ENTMCNC: 31.4 G/DL (ref 31–36)
MCV RBC AUTO: 71.8 FL (ref 80–100)
MONOCYTES ABSOLUTE: 0.6 K/UL (ref 0–1.3)
MONOCYTES RELATIVE PERCENT: 7.7 %
NEUTROPHILS ABSOLUTE: 5.5 K/UL (ref 1.7–7.7)
NEUTROPHILS RELATIVE PERCENT: 70 %
PDW BLD-RTO: 22.3 % (ref 12.4–15.4)
PLATELET # BLD: 404 K/UL (ref 135–450)
PMV BLD AUTO: 8.5 FL (ref 5–10.5)
POTASSIUM SERPL-SCNC: 3.6 MMOL/L (ref 3.5–5.1)
RBC # BLD: 4.15 M/UL (ref 4.2–5.9)
SEDIMENTATION RATE, ERYTHROCYTE: 98 MM/HR (ref 0–20)
SODIUM BLD-SCNC: 137 MMOL/L (ref 136–145)
TOTAL CK: 29 U/L (ref 39–308)
WBC # BLD: 7.9 K/UL (ref 4–11)

## 2018-08-20 PROCEDURE — 99213 OFFICE O/P EST LOW 20 MIN: CPT | Performed by: INTERNAL MEDICINE

## 2018-08-20 PROCEDURE — G8417 CALC BMI ABV UP PARAM F/U: HCPCS | Performed by: INTERNAL MEDICINE

## 2018-08-20 PROCEDURE — 1111F DSCHRG MED/CURRENT MED MERGE: CPT | Performed by: INTERNAL MEDICINE

## 2018-08-20 PROCEDURE — G8598 ASA/ANTIPLAT THER USED: HCPCS | Performed by: INTERNAL MEDICINE

## 2018-08-20 PROCEDURE — 2022F DILAT RTA XM EVC RTNOPTHY: CPT | Performed by: INTERNAL MEDICINE

## 2018-08-20 PROCEDURE — G8428 CUR MEDS NOT DOCUMENT: HCPCS | Performed by: INTERNAL MEDICINE

## 2018-08-20 PROCEDURE — 3044F HG A1C LEVEL LT 7.0%: CPT | Performed by: INTERNAL MEDICINE

## 2018-08-20 PROCEDURE — 36415 COLL VENOUS BLD VENIPUNCTURE: CPT

## 2018-08-20 PROCEDURE — 1036F TOBACCO NON-USER: CPT | Performed by: INTERNAL MEDICINE

## 2018-08-20 PROCEDURE — 82550 ASSAY OF CK (CPK): CPT

## 2018-08-20 PROCEDURE — 80048 BASIC METABOLIC PNL TOTAL CA: CPT

## 2018-08-20 PROCEDURE — 85652 RBC SED RATE AUTOMATED: CPT

## 2018-08-20 PROCEDURE — 85025 COMPLETE CBC W/AUTO DIFF WBC: CPT

## 2018-08-20 PROCEDURE — 3017F COLORECTAL CA SCREEN DOC REV: CPT | Performed by: INTERNAL MEDICINE

## 2018-08-20 ASSESSMENT — ENCOUNTER SYMPTOMS
SHORTNESS OF BREATH: 0
COLOR CHANGE: 0
CHEST TIGHTNESS: 0
RHINORRHEA: 0
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
## 2018-08-21 PROBLEM — L29.9 PRURITUS: Status: RESOLVED | Noted: 2018-07-10 | Resolved: 2018-08-21

## 2018-08-21 PROBLEM — I26.99 PULMONARY EMBOLISM WITHOUT ACUTE COR PULMONALE (HCC): Status: RESOLVED | Noted: 2018-07-31 | Resolved: 2018-08-21
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
## 2018-08-22 ENCOUNTER — HOSPITAL ENCOUNTER (OUTPATIENT)
Dept: WOUND CARE | Age: 53
Discharge: HOME | End: 2018-08-22
Payer: MEDICARE

## 2018-08-22 VITALS
HEART RATE: 90 BPM | DIASTOLIC BLOOD PRESSURE: 68 MMHG | WEIGHT: 244 LBS | BODY MASS INDEX: 33.05 KG/M2 | SYSTOLIC BLOOD PRESSURE: 108 MMHG | RESPIRATION RATE: 16 BRPM | HEIGHT: 72 IN | TEMPERATURE: 97.7 F

## 2018-08-22 DIAGNOSIS — M79.601 RIGHT ARM PAIN: ICD-10-CM

## 2018-08-22 PROCEDURE — 97597 DBRDMT OPN WND 1ST 20 CM/<: CPT | Performed by: INTERNAL MEDICINE

## 2018-08-22 PROCEDURE — 97598 DBRDMT OPN WND ADDL 20CM/<: CPT

## 2018-08-22 PROCEDURE — 99214 OFFICE O/P EST MOD 30 MIN: CPT | Performed by: INTERNAL MEDICINE

## 2018-08-22 PROCEDURE — 17250 CHEM CAUT OF GRANLTJ TISSUE: CPT

## 2018-08-22 PROCEDURE — 97597 DBRDMT OPN WND 1ST 20 CM/<: CPT

## 2018-08-22 PROCEDURE — 97605 NEG PRS WND THER DME<=50SQCM: CPT

## 2018-08-22 PROCEDURE — 11043 DBRDMT MUSC&/FSCA 1ST 20/<: CPT | Performed by: INTERNAL MEDICINE

## 2018-08-22 PROCEDURE — 97598 DBRDMT OPN WND ADDL 20CM/<: CPT | Performed by: INTERNAL MEDICINE

## 2018-08-22 ASSESSMENT — PAIN DESCRIPTION - PROGRESSION: CLINICAL_PROGRESSION: NOT CHANGED

## 2018-08-22 ASSESSMENT — PAIN SCALES - GENERAL: PAINLEVEL_OUTOF10: 5

## 2018-08-22 ASSESSMENT — PAIN DESCRIPTION - LOCATION: LOCATION: HEAD;NECK

## 2018-08-22 ASSESSMENT — PAIN DESCRIPTION - DESCRIPTORS: DESCRIPTORS: ACHING

## 2018-08-22 ASSESSMENT — PAIN DESCRIPTION - PAIN TYPE: TYPE: ACUTE PAIN

## 2018-08-22 ASSESSMENT — PAIN DESCRIPTION - FREQUENCY: FREQUENCY: CONTINUOUS

## 2018-08-22 ASSESSMENT — PAIN DESCRIPTION - ONSET: ONSET: ON-GOING

## 2018-08-22 NOTE — PLAN OF CARE
Problem: Wound:  Goal: Will show signs of wound healing; wound closure and no evidence of infection  Will show signs of wound healing; wound closure and no evidence of infection   Outcome: Ongoing  Pt. Present to [facility] today & has VAC & TCC's in place from when Dr. Ami Rodriguez placed on 8/7/18. Pt. States he arrived at Dr. Vielka Shaw office on Friday 8/17/18 & they did not remove TCC's or change VAC as was expected d/t not having the proper dressing supplies needed or VAC. Dr. Vielka Shaw advised pt. To follow up in [facility] as scheduled today. TCC's & NPWT removed today in [facility]. Dr. Ami Rodriguez to stop [facility] & will return pump to [facility]. Dressings to be placed to Bilateral foot/leg wounds as ordered per Dr Ami Rodriguez & football dressings for off-loading. Will use ace for edema control in legs. Ischial wound stable this week, debridement & Ag Nitrate used per MD, will cont. With current wound care regime. Thoracic wound stable & showing improvement, debridement per MD, cont. With NPWT as ordered. Pt. States he has cont. To be in bed most of the time & limiting time spent in chair to a minimum, enc. Pt. To cont. PICC & IV antibiotics to be discontinued starting today per Dr. Ami Rodriguez. F/u in [facility] in 1 week as ordered. Discharge instructions reviewed with patient, all questions answered, copy given to patient. Dressings were applied to all wounds per M.DWard Instructions at this visit.

## 2018-08-25 PROBLEM — A49.02 MRSA INFECTION: Status: RESOLVED | Noted: 2017-02-07 | Resolved: 2018-08-25

## 2018-08-25 PROBLEM — A49.8 INFECTION WITH PSEUDOMONAS AERUGINOSA RESISTANT TO MULTIPLE DRUGS: Status: RESOLVED | Noted: 2018-07-31 | Resolved: 2018-08-25

## 2018-08-25 PROBLEM — Z16.24 INFECTION WITH PSEUDOMONAS AERUGINOSA RESISTANT TO MULTIPLE DRUGS: Status: RESOLVED | Noted: 2018-07-31 | Resolved: 2018-08-25

## 2018-08-25 PROBLEM — M79.601 RIGHT ARM PAIN: Status: ACTIVE | Noted: 2018-08-25
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
## 2018-08-27 LAB
FUNGUS (MYCOLOGY) CULTURE: NORMAL
FUNGUS STAIN: NORMAL

## 2018-08-29 ENCOUNTER — HOSPITAL ENCOUNTER (OUTPATIENT)
Dept: WOUND CARE | Age: 53
Discharge: HOME | End: 2018-08-29
Payer: MEDICARE

## 2018-08-29 VITALS
RESPIRATION RATE: 17 BRPM | SYSTOLIC BLOOD PRESSURE: 106 MMHG | TEMPERATURE: 97.8 F | DIASTOLIC BLOOD PRESSURE: 63 MMHG | HEART RATE: 91 BPM | HEIGHT: 72 IN | BODY MASS INDEX: 33.09 KG/M2

## 2018-08-29 LAB
GLUCOSE BLD-MCNC: 67 MG/DL (ref 70–99)
GLUCOSE BLD-MCNC: 68 MG/DL (ref 70–99)
GLUCOSE BLD-MCNC: 69 MG/DL (ref 70–99)
GLUCOSE BLD-MCNC: 77 MG/DL (ref 70–99)
GLUCOSE BLD-MCNC: 79 MG/DL (ref 70–99)
PERFORMED ON: ABNORMAL
PERFORMED ON: NORMAL
PERFORMED ON: NORMAL

## 2018-08-29 PROCEDURE — 11042 DBRDMT SUBQ TIS 1ST 20SQCM/<: CPT | Performed by: INTERNAL MEDICINE

## 2018-08-29 PROCEDURE — 97598 DBRDMT OPN WND ADDL 20CM/<: CPT

## 2018-08-29 PROCEDURE — 17250 CHEM CAUT OF GRANLTJ TISSUE: CPT

## 2018-08-29 PROCEDURE — 97597 DBRDMT OPN WND 1ST 20 CM/<: CPT

## 2018-08-29 PROCEDURE — 97605 NEG PRS WND THER DME<=50SQCM: CPT

## 2018-08-29 PROCEDURE — 97598 DBRDMT OPN WND ADDL 20CM/<: CPT | Performed by: INTERNAL MEDICINE

## 2018-08-29 PROCEDURE — 17250 CHEM CAUT OF GRANLTJ TISSUE: CPT | Performed by: INTERNAL MEDICINE

## 2018-08-29 PROCEDURE — 11043 DBRDMT MUSC&/FSCA 1ST 20/<: CPT | Performed by: INTERNAL MEDICINE

## 2018-08-29 PROCEDURE — 97597 DBRDMT OPN WND 1ST 20 CM/<: CPT | Performed by: INTERNAL MEDICINE

## 2018-08-29 PROCEDURE — 11043 DBRDMT MUSC&/FSCA 1ST 20/<: CPT

## 2018-08-29 PROCEDURE — 11042 DBRDMT SUBQ TIS 1ST 20SQCM/<: CPT

## 2018-08-29 RX ORDER — LIDOCAINE 40 MG/G
CREAM TOPICAL PRN
Status: DISCONTINUED | OUTPATIENT
Start: 2018-08-29 | End: 2018-08-30 | Stop reason: HOSPADM

## 2018-08-29 ASSESSMENT — PAIN SCALES - GENERAL: PAINLEVEL_OUTOF10: 0

## 2018-08-30 PROBLEM — R51.9 HEADACHE: Status: RESOLVED | Noted: 2018-08-12 | Resolved: 2018-08-30

## 2018-08-30 PROBLEM — M79.601 RIGHT ARM PAIN: Status: RESOLVED | Noted: 2018-08-25 | Resolved: 2018-08-30

## 2018-08-30 PROBLEM — M86.471 CHRONIC OSTEOMYELITIS OF RIGHT FOOT WITH DRAINING SINUS (HCC): Status: RESOLVED | Noted: 2018-07-27 | Resolved: 2018-08-30

## 2018-08-30 NOTE — PROGRESS NOTES
[facility] Progress Note    Hemalatha Weems     : 1965    DATE OF VISIT:  2018    Subjective:     Hemalatha Weems is a 50 y.o. male who has a non-healing surgical ulcer located on the back. Significant symptoms or pertinent wound history since last visit: overall doing ok - nausea resolved, headache nearly resolved, one eye still uncomfortable - sees ophthalmology soon. Eating well, no vomiting. No respiratory symptoms. Had some malodor (I think mostly from old blood) at foot and ischial wounds this week. States that he was not up in his chair much on most days, but was up for 5 hours on Saturday. Saturday night is when he said the ischial drainage increased. States that he's always in his Prevalon boots, and tells me it only takes him about 20 minutes to be out of bed for a shower twice a week (which is surprisingly quick). Additional ulcer(s) noted? yes. Right hip, B/L heels, B/L lateral foot, left lateral leg opened up, small spot at left knee opened up, STSG harvest site not yet completely healed. His current medication list consists of Apixaban, Insulin Lispro, aspirin, clopidogrel, cyclobenzaprine, docusate sodium, ferrous sulfate, insulin glargine, magnesium oxide, metFORMIN, metoclopramide, metoprolol succinate, nitroGLYCERIN, nystatin, oxyCODONE, pantoprazole, promethazine, rosuvastatin, senna, torsemide, and traZODone. Off all Abx. Allergies: Benadryl [diphenhydramine hcl]; Bactrim [sulfamethoxazole-trimethoprim]; Statins [statins];  Cephalexin; Morphine; Penicillins; and Sulfa antibiotics    Objective:     Vitals:    18 1025   BP: 106/63   Pulse: 91   Resp: 17   Temp: 97.8 °F (36.6 °C)   TempSrc: Oral   Height: 6' (1.829 m)     AAOx3, overweight, fatigued, NAD  No cellulitis, severe Candidiasis or any contact dermatitis; one area of fluctuance beneath distal part of right foot incision  Mild-mod LE lymphedema, feet warm, good cap refill; the first with long-term current use of insulin ([facility]) E11.628, Z79.4    Paraplegia, complete ([facility]) G82.21    Chronic back pain M54.9, G89.29    Arthritis M19.90    Pressure ulcer of right hip, stage 4 ([facility]) L89.214    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 7XXA    Iron deficiency anemia due to chronic blood loss D50.0    Lymphedema of both lower extremities I89.0    Neurogenic bladder N31.9    Neurogenic bowel K59.2    Pressure ulcer of right buttock, stage 4 ([facility]) L89.314    Hypergranulation L92.9    Dehiscence of surgical wound of T-spine T81.31XA    Onychomycosis B35.1    Dystrophic nail L60.3    Peripheral venous insufficiency I87.2    Ischemic cardiomyopathy I25.5    Chronic systolic heart failure ([facility]) I50.22    Pressure ulcer of right foot, stage 4 ([facility]) L89.894    Pressure ulcer of right heel, stage 4 ([facility]) L89.614    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 The ischium and the right foot didn't do as well this week, I think largely from pressure, and there's still come concern with stopping Abx earlier than intended for right foot osteo, but other areas seem to be coming along; heavy bleeding at most wounds is still making this challenging too. Reassuring to hear that the nausea and headache are much improved. Factors contributing to occurrence and/or persistence of the chronic ulcer include lymphedema, diabetes, chronic pressure, decreased mobility, shear force, obesity and non-adherence (just in terms of offloading). Medical necessity of today's visit is shown by the above documentation. Sharp debridement is indicated today, based upon the exam findings in the wound(s) above. Procedure note:     Consent obtained. Time out performed per [facility] policy. Anesthetic  Anesthetic: 4% Lidocaine Liquid Topical     Using a curette, I sharply debrided the back and buttocks ulcer(s) down through and including the removal of dermis. The type(s) of tissue debrided included fibrin, biofilm, necrotic/eschar and exudate. Total Surface Area Debrided: approx 70 sq cm. Using a curette, I sharply debrided the left lateral foot ulcer(s) down through and including the removal of subcutaneous tissue. The type(s) of tissue debrided included fibrin and necrotic/eschar. Total Surface Area Debrided: 2 sq cm. Using a curette, #15 blade scalpel and forceps, I sharply debrided the right lateral foot ulcer(s) down through and including the removal of muscle/fascia. The type(s) of tissue debrided included fibrin, biofilm, slough and necrotic/eschar. Total Surface Area Debrided: approx 20 sq cm.     Post  Debridement Measurements from today:  Wound 10/25/17 #29, right lateral foot (merged with heel #31), DFU, Mccauley 2 onset 10/25/17, pressure-Wound Length (cm): 23 cm  Wound 08/07/18 #33, left lateral foot, Pressure ulcer, stage IV, (onset 8/2018) pressure -Wound Length (cm): 2 cm  Wound 08/29/18 #35- L distal foot, diabetic, Mccauley 1, onset 8/29/18, surgical -Wound Length (cm): 1.2 cm  Wound 08/29/18 #36- L lateral lower leg, venous, partial thickness, onset 8/29/18, unknown-Wound Length (cm): 8.5 cm  Wound 08/22/18 #34, Left medial knee, Pressure Stage 2, partial thickness, Onset last week, gradually appeared-Wound Length (cm): 1 cm  Wound 08/16/17 #27- Thoracic spine, dehisced surgical wound, full thickness, onset 8/16/2017, pressure-Wound Length (cm): 8.2 cm  Wound 10/17/14  #8, right ischium, stage 4 PU, onset 7/15/14 (merged with #30), pressure-Wound Length (cm): 12 cm    Wound 10/25/17 #29, right lateral foot (merged with heel #31), DFU, Mccauley 2 onset 10/25/17, pressure-Wound Width (cm): 5 cm  Wound 08/07/18 #33, left lateral foot, Pressure ulcer, stage IV, (onset 8/2018) pressure -Wound Width (cm): 1 cm  Wound 08/29/18 #35- L distal foot, diabetic, Mccauley 1, onset 8/29/18, surgical -Wound Width (cm): 0.5 cm  Wound 08/29/18 #36- L lateral lower leg, venous, partial thickness, onset 8/29/18, unknown-Wound Width (cm): 1.4 cm  Wound 08/22/18 #34, Left medial knee, Pressure Stage 2, partial thickness, Onset last week, gradually appeared-Wound Width (cm): 0.9 cm  Wound 08/16/17 #27- Thoracic spine, dehisced surgical wound, full thickness, onset 8/16/2017, pressure-Wound Width (cm): 4.5 cm  Wound 10/17/14  #8, right ischium, stage 4 PU, onset 7/15/14 (merged with #30), pressure-Wound Width (cm): 4.5 cm    Wound 10/25/17 #29, right lateral foot (merged with heel #31), DFU, Mccauley 2 onset 10/25/17, pressure-Wound Depth (cm) : 1  Wound 08/07/18 #33, left lateral foot, Pressure ulcer, stage IV, (onset 8/2018) pressure -Wound Depth (cm) : 0.8  Wound 08/29/18 #35- L distal foot, diabetic, Mccauley 1, onset 8/29/18, surgical -Wound Depth (cm) : 0.3  Wound 08/29/18 #36- L lateral lower leg, venous, partial thickness, onset 8/29/18, unknown-Wound Depth (cm) : 0.2  Wound 08/22/18 #34, Left medial knee, Pressure Stage 2, partial thickness, Onset last week, gradually appeared-Wound Depth (cm) : 0.1  Wound 08/16/17 #27- Thoracic spine, dehisced surgical wound, full thickness, onset 8/16/2017, pressure-Wound Depth (cm) : 1  Wound 10/17/14  #8, right ischium, stage 4 PU, onset 7/15/14 (merged with #30), pressure-Wound Depth (cm) : 1.3    The ulcers were then irrigated with normal saline solution. The procedure was completed with a moderate to heavy amount of bleeding, and hemostasis was by pressure, by silver nitrate stick and ORC. The patient tolerated the procedure well, with no significant complications. The patient's level of pain during and after the procedure was monitored, and is noted in the wound documentation flowsheet.     I also removed the staples from both heels today. To encourage better epithelial cell coverage, I did use AgNO3 to chemically cauterize hypergranulation tissue on the buttock and heel ulcer(s), after application of 4% lidocaine topical solution. This was tolerated well, with no pain or skin injury. Discharge plan:     Treatment in the wound care center today: Wound 10/25/17 #29, right lateral foot (merged with heel #31), DFU, Mccauley 2 onset 10/25/17, pressure-Dressing/Treatment:  (Kaela Aland Ag,football drg,dbl med tubi)  Wound 08/07/18 #33, left lateral foot, Pressure ulcer, stage IV, (onset 8/2018) pressure -Dressing/Treatment:  (adaptic opticel ag 4x4's ABD football dressing)  Wound 08/29/18 #35- L distal foot, diabetic, Mccauley 1, onset 8/29/18, surgical -Dressing/Treatment:  (adaptic opticel ag 4x4's ABD football dressing)  Wound 08/29/18 #36- L lateral lower leg, venous, partial thickness, onset 8/29/18, unknown-Dressing/Treatment:  (adaptic opticel ag 4x4's ABD kerlix double tubigrip)  Wound 08/22/18 #34, Left medial knee, Pressure Stage 2, partial thickness, Onset last week, gradually appeared-Dressing/Treatment: Other (Comment) (PSO, mepilex border)  Wound 08/16/17 #27- Thoracic spine, dehisced surgical wound, full thickness, onset 8/16/2017, pressure-Dressing/Treatment: Vacuum dressing (hypochlorous acid, DuoDerm over skin tears, no tunnel packing, but Sorbact, then foam)  Wound 10/17/14  #8, right ischium, stage 4 PU, onset 7/15/14 (merged with #30), pressure-Dressing/Treatment:  (nexodyn, triad, aquacel ag, bolster, mepilex border). Keep up the good work with protein intake and glucose control. No Abx at this time, will watch T-spine and right foot closely, in particular. Reminded him how imperative it is for him to spend as little time in his chair as possible; the day after he came out of the hospital, where he was virtually never in his chair, I've never seen his ischial ulcer look better.  When up in his chair for several hours on just one day this week, the decline was noticeable. Continue to be mindful about pressure against the feet also, jasmeet the right side. We added an extra roll of cast padding to his football dressings this week; if ongoing pressure damage next week, he'll have to go back to a cast(s). Too soon to fit him for CROWs, as he still has relatively bulky dressings in place (so his CROW would either be sized for dressings and then be too loose later, or be sized without dressings and not fit now). Home treatment: good handwashing before and after any dressing changes. Cleanse wound with saline or soap & water before dressing change. May use Vaseline (petrolatum), Aquaphor, Aveeno, CeraVe, Cetaphil, Eucerin, Lubriderm, etc for dry skin. Dressing type for home: Other: keep football dressings in place for the week; change STSG harvest site dressing PRN for heavy drainage; otherwise as above, 3 times weekly. Written discharge instructions given to patient. Follow up in 1 week.     Electronically signed by Grupo Forrester MD on 8/30/2018 at 6:45 PM.

## 2018-09-04 ENCOUNTER — HOSPITAL ENCOUNTER (EMERGENCY)
Age: 53
Discharge: HOME | DRG: 291 | End: 2018-09-05
Attending: EMERGENCY MEDICINE
Payer: MEDICARE

## 2018-09-04 ENCOUNTER — APPOINTMENT (OUTPATIENT)
Dept: GENERAL RADIOLOGY | Age: 53
DRG: 291 | End: 2018-09-04
Payer: MEDICARE

## 2018-09-04 VITALS
OXYGEN SATURATION: 98 % | DIASTOLIC BLOOD PRESSURE: 70 MMHG | HEART RATE: 85 BPM | WEIGHT: 250 LBS | RESPIRATION RATE: 18 BRPM | SYSTOLIC BLOOD PRESSURE: 113 MMHG | TEMPERATURE: 98 F | HEIGHT: 74 IN | BODY MASS INDEX: 32.08 KG/M2

## 2018-09-04 DIAGNOSIS — I50.22 CHRONIC SYSTOLIC CONGESTIVE HEART FAILURE (HCC): Primary | ICD-10-CM

## 2018-09-04 LAB
A/G RATIO: 0.8 (ref 1.1–2.2)
ALBUMIN SERPL-MCNC: 3.4 G/DL (ref 3.4–5)
ALP BLD-CCNC: 131 U/L (ref 40–129)
ALT SERPL-CCNC: 8 U/L (ref 10–40)
ANION GAP SERPL CALCULATED.3IONS-SCNC: 18 MMOL/L (ref 3–16)
AST SERPL-CCNC: 13 U/L (ref 15–37)
BASOPHILS ABSOLUTE: 0.1 K/UL (ref 0–0.2)
BASOPHILS RELATIVE PERCENT: 1 %
BILIRUB SERPL-MCNC: 0.3 MG/DL (ref 0–1)
BUN BLDV-MCNC: 32 MG/DL (ref 7–20)
CALCIUM SERPL-MCNC: 8.8 MG/DL (ref 8.3–10.6)
CHLORIDE BLD-SCNC: 91 MMOL/L (ref 99–110)
CO2: 23 MMOL/L (ref 21–32)
CREAT SERPL-MCNC: 1.3 MG/DL (ref 0.9–1.3)
EOSINOPHILS ABSOLUTE: 0.1 K/UL (ref 0–0.6)
EOSINOPHILS RELATIVE PERCENT: 1.1 %
GFR AFRICAN AMERICAN: >60
GFR NON-AFRICAN AMERICAN: 58
GLOBULIN: 4.2 G/DL
GLUCOSE BLD-MCNC: 116 MG/DL (ref 70–99)
HCT VFR BLD CALC: 22.9 % (ref 40.5–52.5)
HEMOGLOBIN: 7 G/DL (ref 13.5–17.5)
LYMPHOCYTES ABSOLUTE: 1.1 K/UL (ref 1–5.1)
LYMPHOCYTES RELATIVE PERCENT: 9.7 %
MCH RBC QN AUTO: 22.5 PG (ref 26–34)
MCHC RBC AUTO-ENTMCNC: 30.6 G/DL (ref 31–36)
MCV RBC AUTO: 73.7 FL (ref 80–100)
MONOCYTES ABSOLUTE: 0.7 K/UL (ref 0–1.3)
MONOCYTES RELATIVE PERCENT: 6.3 %
NEUTROPHILS ABSOLUTE: 9.2 K/UL (ref 1.7–7.7)
NEUTROPHILS RELATIVE PERCENT: 81.9 %
PDW BLD-RTO: 22.9 % (ref 12.4–15.4)
PLATELET # BLD: 472 K/UL (ref 135–450)
PMV BLD AUTO: 8.3 FL (ref 5–10.5)
POTASSIUM SERPL-SCNC: 3.8 MMOL/L (ref 3.5–5.1)
PRO-BNP: ABNORMAL PG/ML (ref 0–124)
RBC # BLD: 3.11 M/UL (ref 4.2–5.9)
SODIUM BLD-SCNC: 132 MMOL/L (ref 136–145)
TOTAL PROTEIN: 7.6 G/DL (ref 6.4–8.2)
TROPONIN: 0.13 NG/ML
WBC # BLD: 11.3 K/UL (ref 4–11)

## 2018-09-04 PROCEDURE — 96374 THER/PROPH/DIAG INJ IV PUSH: CPT

## 2018-09-04 PROCEDURE — 84484 ASSAY OF TROPONIN QUANT: CPT

## 2018-09-04 PROCEDURE — 85025 COMPLETE CBC W/AUTO DIFF WBC: CPT

## 2018-09-04 PROCEDURE — 93005 ELECTROCARDIOGRAM TRACING: CPT | Performed by: EMERGENCY MEDICINE

## 2018-09-04 PROCEDURE — 99285 EMERGENCY DEPT VISIT HI MDM: CPT

## 2018-09-04 PROCEDURE — 83880 ASSAY OF NATRIURETIC PEPTIDE: CPT

## 2018-09-04 PROCEDURE — 80053 COMPREHEN METABOLIC PANEL: CPT

## 2018-09-04 PROCEDURE — 71045 X-RAY EXAM CHEST 1 VIEW: CPT

## 2018-09-04 PROCEDURE — 6360000002 HC RX W HCPCS: Performed by: PHYSICIAN ASSISTANT

## 2018-09-04 RX ORDER — FUROSEMIDE 10 MG/ML
20 INJECTION INTRAMUSCULAR; INTRAVENOUS ONCE
Status: COMPLETED | OUTPATIENT
Start: 2018-09-04 | End: 2018-09-04

## 2018-09-04 RX ADMIN — FUROSEMIDE 20 MG: 10 INJECTION, SOLUTION INTRAMUSCULAR; INTRAVENOUS at 21:16

## 2018-09-04 ASSESSMENT — PAIN DESCRIPTION - ORIENTATION: ORIENTATION: LEFT

## 2018-09-04 ASSESSMENT — PAIN DESCRIPTION - LOCATION: LOCATION: SHOULDER;NECK

## 2018-09-04 ASSESSMENT — ENCOUNTER SYMPTOMS
RESPIRATORY NEGATIVE: 1
GASTROINTESTINAL NEGATIVE: 1

## 2018-09-04 ASSESSMENT — PAIN DESCRIPTION - PAIN TYPE: TYPE: CHRONIC PAIN

## 2018-09-04 ASSESSMENT — PAIN SCALES - GENERAL: PAINLEVEL_OUTOF10: 3

## 2018-09-05 ENCOUNTER — HOSPITAL ENCOUNTER (OUTPATIENT)
Dept: WOUND CARE | Age: 53
Discharge: HOME | DRG: 291 | End: 2018-09-05
Payer: MEDICARE

## 2018-09-05 ENCOUNTER — HOSPITAL ENCOUNTER (INPATIENT)
Age: 53
LOS: 2 days | Discharge: HOME | DRG: 291 | End: 2018-09-07
Attending: INTERNAL MEDICINE | Admitting: INTERNAL MEDICINE
Payer: MEDICARE

## 2018-09-05 VITALS
SYSTOLIC BLOOD PRESSURE: 93 MMHG | WEIGHT: 255 LBS | HEART RATE: 70 BPM | DIASTOLIC BLOOD PRESSURE: 65 MMHG | RESPIRATION RATE: 16 BRPM | TEMPERATURE: 97.4 F | HEIGHT: 74 IN | BODY MASS INDEX: 32.73 KG/M2 | OXYGEN SATURATION: 98 %

## 2018-09-05 PROBLEM — I50.9 CONGESTIVE HEART FAILURE (HCC): Status: ACTIVE | Noted: 2018-09-05

## 2018-09-05 LAB
ABO/RH: NORMAL
ANION GAP SERPL CALCULATED.3IONS-SCNC: 17 MMOL/L (ref 3–16)
ANTIBODY SCREEN: NORMAL
BLOOD BANK DISPENSE STATUS: NORMAL
BLOOD BANK PRODUCT CODE: NORMAL
BPU ID: NORMAL
BUN BLDV-MCNC: 35 MG/DL (ref 7–20)
CALCIUM SERPL-MCNC: 8.8 MG/DL (ref 8.3–10.6)
CHLORIDE BLD-SCNC: 98 MMOL/L (ref 99–110)
CO2: 23 MMOL/L (ref 21–32)
CREAT SERPL-MCNC: 1.2 MG/DL (ref 0.9–1.3)
DESCRIPTION BLOOD BANK: NORMAL
EKG ATRIAL RATE: 82 BPM
EKG DIAGNOSIS: NORMAL
EKG P AXIS: 37 DEGREES
EKG P-R INTERVAL: 166 MS
EKG Q-T INTERVAL: 422 MS
EKG QRS DURATION: 82 MS
EKG QTC CALCULATION (BAZETT): 493 MS
EKG R AXIS: -16 DEGREES
EKG T AXIS: 1 DEGREES
EKG VENTRICULAR RATE: 82 BPM
GFR AFRICAN AMERICAN: >60
GFR NON-AFRICAN AMERICAN: >60
GLUCOSE BLD-MCNC: 112 MG/DL (ref 70–99)
GLUCOSE BLD-MCNC: 113 MG/DL (ref 70–99)
GLUCOSE BLD-MCNC: 118 MG/DL (ref 70–99)
GLUCOSE BLD-MCNC: 156 MG/DL (ref 70–99)
GLUCOSE BLD-MCNC: 189 MG/DL (ref 70–99)
IRON SATURATION: 10 % (ref 20–50)
IRON: 27 UG/DL (ref 59–158)
LV EF: 23 %
LVEF MODALITY: NORMAL
MAGNESIUM: 2.1 MG/DL (ref 1.8–2.4)
PERFORMED ON: ABNORMAL
POTASSIUM SERPL-SCNC: 3.9 MMOL/L (ref 3.5–5.1)
PRO-BNP: ABNORMAL PG/ML (ref 0–124)
SODIUM BLD-SCNC: 138 MMOL/L (ref 136–145)
TOTAL IRON BINDING CAPACITY: 267 UG/DL (ref 260–445)

## 2018-09-05 PROCEDURE — P9016 RBC LEUKOCYTES REDUCED: HCPCS

## 2018-09-05 PROCEDURE — 80048 BASIC METABOLIC PNL TOTAL CA: CPT

## 2018-09-05 PROCEDURE — 51702 INSERT TEMP BLADDER CATH: CPT

## 2018-09-05 PROCEDURE — 87081 CULTURE SCREEN ONLY: CPT

## 2018-09-05 PROCEDURE — 99214 OFFICE O/P EST MOD 30 MIN: CPT

## 2018-09-05 PROCEDURE — 2060000000 HC ICU INTERMEDIATE R&B

## 2018-09-05 PROCEDURE — 6360000002 HC RX W HCPCS: Performed by: INTERNAL MEDICINE

## 2018-09-05 PROCEDURE — 86901 BLOOD TYPING SEROLOGIC RH(D): CPT

## 2018-09-05 PROCEDURE — 83540 ASSAY OF IRON: CPT

## 2018-09-05 PROCEDURE — 17250 CHEM CAUT OF GRANLTJ TISSUE: CPT | Performed by: INTERNAL MEDICINE

## 2018-09-05 PROCEDURE — 93010 ELECTROCARDIOGRAM REPORT: CPT | Performed by: INTERNAL MEDICINE

## 2018-09-05 PROCEDURE — C8929 TTE W OR WO FOL WCON,DOPPLER: HCPCS

## 2018-09-05 PROCEDURE — 0H5MXZZ DESTRUCTION OF RIGHT FOOT SKIN, EXTERNAL APPROACH: ICD-10-PCS | Performed by: INTERNAL MEDICINE

## 2018-09-05 PROCEDURE — 17250 CHEM CAUT OF GRANLTJ TISSUE: CPT

## 2018-09-05 PROCEDURE — 83550 IRON BINDING TEST: CPT

## 2018-09-05 PROCEDURE — 36430 TRANSFUSION BLD/BLD COMPNT: CPT

## 2018-09-05 PROCEDURE — 6360000002 HC RX W HCPCS: Performed by: PHYSICIAN ASSISTANT

## 2018-09-05 PROCEDURE — 86850 RBC ANTIBODY SCREEN: CPT

## 2018-09-05 PROCEDURE — 6370000000 HC RX 637 (ALT 250 FOR IP): Performed by: INTERNAL MEDICINE

## 2018-09-05 PROCEDURE — 83735 ASSAY OF MAGNESIUM: CPT

## 2018-09-05 PROCEDURE — 36415 COLL VENOUS BLD VENIPUNCTURE: CPT

## 2018-09-05 PROCEDURE — 2580000003 HC RX 258: Performed by: PHYSICIAN ASSISTANT

## 2018-09-05 PROCEDURE — 99215 OFFICE O/P EST HI 40 MIN: CPT | Performed by: INTERNAL MEDICINE

## 2018-09-05 PROCEDURE — 6360000004 HC RX CONTRAST MEDICATION: Performed by: INTERNAL MEDICINE

## 2018-09-05 PROCEDURE — 83880 ASSAY OF NATRIURETIC PEPTIDE: CPT

## 2018-09-05 PROCEDURE — 86900 BLOOD TYPING SEROLOGIC ABO: CPT

## 2018-09-05 PROCEDURE — 99223 1ST HOSP IP/OBS HIGH 75: CPT | Performed by: INTERNAL MEDICINE

## 2018-09-05 PROCEDURE — 86923 COMPATIBILITY TEST ELECTRIC: CPT

## 2018-09-05 PROCEDURE — 2580000003 HC RX 258: Performed by: INTERNAL MEDICINE

## 2018-09-05 RX ORDER — SODIUM CHLORIDE 0.9 % (FLUSH) 0.9 %
10 SYRINGE (ML) INJECTION EVERY 12 HOURS SCHEDULED
Status: DISCONTINUED | OUTPATIENT
Start: 2018-09-05 | End: 2018-09-07 | Stop reason: HOSPADM

## 2018-09-05 RX ORDER — GLUCAGON 1 MG/ML
1 KIT INJECTION PRN
Status: DISCONTINUED | OUTPATIENT
Start: 2018-09-05 | End: 2018-09-07 | Stop reason: HOSPADM

## 2018-09-05 RX ORDER — DOCUSATE SODIUM 100 MG/1
100 CAPSULE, LIQUID FILLED ORAL 2 TIMES DAILY
Status: DISCONTINUED | OUTPATIENT
Start: 2018-09-05 | End: 2018-09-07 | Stop reason: HOSPADM

## 2018-09-05 RX ORDER — CLOPIDOGREL BISULFATE 75 MG/1
75 TABLET ORAL DAILY
Status: DISCONTINUED | OUTPATIENT
Start: 2018-09-05 | End: 2018-09-07 | Stop reason: HOSPADM

## 2018-09-05 RX ORDER — DEXTROSE MONOHYDRATE 50 MG/ML
100 INJECTION, SOLUTION INTRAVENOUS PRN
Status: DISCONTINUED | OUTPATIENT
Start: 2018-09-05 | End: 2018-09-07 | Stop reason: HOSPADM

## 2018-09-05 RX ORDER — INSULIN GLARGINE 100 [IU]/ML
45 INJECTION, SOLUTION SUBCUTANEOUS NIGHTLY
Status: DISCONTINUED | OUTPATIENT
Start: 2018-09-05 | End: 2018-09-05

## 2018-09-05 RX ORDER — PROMETHAZINE HYDROCHLORIDE 25 MG/1
12.5 TABLET ORAL EVERY 6 HOURS PRN
Status: DISCONTINUED | OUTPATIENT
Start: 2018-09-05 | End: 2018-09-07 | Stop reason: HOSPADM

## 2018-09-05 RX ORDER — FUROSEMIDE 10 MG/ML
40 INJECTION INTRAMUSCULAR; INTRAVENOUS DAILY
Status: DISCONTINUED | OUTPATIENT
Start: 2018-09-05 | End: 2018-09-07 | Stop reason: HOSPADM

## 2018-09-05 RX ORDER — ATORVASTATIN CALCIUM 40 MG/1
40 TABLET, FILM COATED ORAL NIGHTLY
Status: DISCONTINUED | OUTPATIENT
Start: 2018-09-05 | End: 2018-09-05 | Stop reason: ALTCHOICE

## 2018-09-05 RX ORDER — METOPROLOL SUCCINATE 50 MG/1
100 TABLET, EXTENDED RELEASE ORAL DAILY
Status: DISCONTINUED | OUTPATIENT
Start: 2018-09-05 | End: 2018-09-07 | Stop reason: HOSPADM

## 2018-09-05 RX ORDER — NITROGLYCERIN 0.4 MG/1
0.4 TABLET SUBLINGUAL EVERY 5 MIN PRN
Status: DISCONTINUED | OUTPATIENT
Start: 2018-09-05 | End: 2018-09-07 | Stop reason: HOSPADM

## 2018-09-05 RX ORDER — LISINOPRIL 5 MG/1
5 TABLET ORAL DAILY
Status: DISCONTINUED | OUTPATIENT
Start: 2018-09-05 | End: 2018-09-06

## 2018-09-05 RX ORDER — 0.9 % SODIUM CHLORIDE 0.9 %
250 INTRAVENOUS SOLUTION INTRAVENOUS ONCE
Status: COMPLETED | OUTPATIENT
Start: 2018-09-05 | End: 2018-09-06

## 2018-09-05 RX ORDER — TRAZODONE HYDROCHLORIDE 50 MG/1
50 TABLET ORAL NIGHTLY PRN
Status: DISCONTINUED | OUTPATIENT
Start: 2018-09-05 | End: 2018-09-07 | Stop reason: HOSPADM

## 2018-09-05 RX ORDER — DEXTROSE MONOHYDRATE 25 G/50ML
12.5 INJECTION, SOLUTION INTRAVENOUS PRN
Status: DISCONTINUED | OUTPATIENT
Start: 2018-09-05 | End: 2018-09-07 | Stop reason: HOSPADM

## 2018-09-05 RX ORDER — OXYCODONE HYDROCHLORIDE 5 MG/1
5 TABLET ORAL EVERY 6 HOURS PRN
Status: DISCONTINUED | OUTPATIENT
Start: 2018-09-05 | End: 2018-09-07 | Stop reason: HOSPADM

## 2018-09-05 RX ORDER — PANTOPRAZOLE SODIUM 40 MG/1
40 TABLET, DELAYED RELEASE ORAL DAILY
Status: DISCONTINUED | OUTPATIENT
Start: 2018-09-05 | End: 2018-09-07 | Stop reason: HOSPADM

## 2018-09-05 RX ORDER — INSULIN GLARGINE 100 [IU]/ML
40 INJECTION, SOLUTION SUBCUTANEOUS NIGHTLY
Status: DISCONTINUED | OUTPATIENT
Start: 2018-09-06 | End: 2018-09-05

## 2018-09-05 RX ORDER — FUROSEMIDE 10 MG/ML
40 INJECTION INTRAMUSCULAR; INTRAVENOUS DAILY
Status: DISCONTINUED | OUTPATIENT
Start: 2018-09-06 | End: 2018-09-05

## 2018-09-05 RX ORDER — ONDANSETRON 2 MG/ML
4 INJECTION INTRAMUSCULAR; INTRAVENOUS EVERY 6 HOURS PRN
Status: DISCONTINUED | OUTPATIENT
Start: 2018-09-05 | End: 2018-09-05 | Stop reason: ALTCHOICE

## 2018-09-05 RX ORDER — FUROSEMIDE 10 MG/ML
40 INJECTION INTRAMUSCULAR; INTRAVENOUS 2 TIMES DAILY
Status: DISCONTINUED | OUTPATIENT
Start: 2018-09-05 | End: 2018-09-05

## 2018-09-05 RX ORDER — ASPIRIN 81 MG/1
81 TABLET, CHEWABLE ORAL DAILY
Status: DISCONTINUED | OUTPATIENT
Start: 2018-09-05 | End: 2018-09-07 | Stop reason: HOSPADM

## 2018-09-05 RX ORDER — SODIUM CHLORIDE 0.9 % (FLUSH) 0.9 %
10 SYRINGE (ML) INJECTION PRN
Status: DISCONTINUED | OUTPATIENT
Start: 2018-09-05 | End: 2018-09-07 | Stop reason: HOSPADM

## 2018-09-05 RX ORDER — INSULIN GLARGINE 100 [IU]/ML
40 INJECTION, SOLUTION SUBCUTANEOUS NIGHTLY
Status: DISCONTINUED | OUTPATIENT
Start: 2018-09-05 | End: 2018-09-07 | Stop reason: HOSPADM

## 2018-09-05 RX ORDER — ROSUVASTATIN CALCIUM 10 MG/1
20 TABLET, COATED ORAL NIGHTLY
Status: DISCONTINUED | OUTPATIENT
Start: 2018-09-05 | End: 2018-09-07 | Stop reason: HOSPADM

## 2018-09-05 RX ORDER — NICOTINE POLACRILEX 4 MG
15 LOZENGE BUCCAL PRN
Status: DISCONTINUED | OUTPATIENT
Start: 2018-09-05 | End: 2018-09-07 | Stop reason: HOSPADM

## 2018-09-05 RX ADMIN — METFORMIN HYDROCHLORIDE 500 MG: 500 TABLET ORAL at 18:31

## 2018-09-05 RX ADMIN — Medication 10 ML: at 22:37

## 2018-09-05 RX ADMIN — METOPROLOL SUCCINATE 100 MG: 50 TABLET, EXTENDED RELEASE ORAL at 18:31

## 2018-09-05 RX ADMIN — SODIUM CHLORIDE 250 ML: 9 INJECTION, SOLUTION INTRAVENOUS at 21:08

## 2018-09-05 RX ADMIN — ASPIRIN 81 MG: 81 TABLET, CHEWABLE ORAL at 18:31

## 2018-09-05 RX ADMIN — APIXABAN 5 MG: 5 TABLET, FILM COATED ORAL at 18:32

## 2018-09-05 RX ADMIN — PROMETHAZINE HYDROCHLORIDE 12.5 MG: 25 TABLET ORAL at 21:59

## 2018-09-05 RX ADMIN — PERFLUTREN 3.3 MG: 6.52 INJECTION, SUSPENSION INTRAVENOUS at 16:06

## 2018-09-05 RX ADMIN — INSULIN LISPRO 1 UNITS: 100 INJECTION, SOLUTION INTRAVENOUS; SUBCUTANEOUS at 18:39

## 2018-09-05 RX ADMIN — TRAZODONE HYDROCHLORIDE 50 MG: 50 TABLET ORAL at 21:14

## 2018-09-05 RX ADMIN — INSULIN GLARGINE 40 UNITS: 100 INJECTION, SOLUTION SUBCUTANEOUS at 22:08

## 2018-09-05 RX ADMIN — ROSUVASTATIN CALCIUM 20 MG: 10 TABLET, FILM COATED ORAL at 21:13

## 2018-09-05 RX ADMIN — FUROSEMIDE 40 MG: 10 INJECTION, SOLUTION INTRAMUSCULAR; INTRAVENOUS at 18:32

## 2018-09-05 RX ADMIN — CLOPIDOGREL BISULFATE 75 MG: 75 TABLET ORAL at 18:32

## 2018-09-05 ASSESSMENT — PAIN SCALES - GENERAL
PAINLEVEL_OUTOF10: 0
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
## 2018-09-05 NOTE — PROGRESS NOTES
Writer again attempted to return call to floor nurse on PCU & nurse in the middle of dressing changes with another pt. & will again return call to writer.

## 2018-09-05 NOTE — PLAN OF CARE
Problem: Wound:  Goal: Will show signs of wound healing; wound closure and no evidence of infection  Will show signs of wound healing; wound closure and no evidence of infection   Outcome: Ongoing  Pt. Presents to [facility] ER today & states he is very fatigued, short of breath, noted to be more pale than usual. Vital signs noted. Pt. Unable to tolerated to be repositioned flat for wound care to his hip, ischial & back wounds d/t shortness of breath. Patient states that he went to the ER last evening for the same symptoms & was given diuretics & discharged. Dressings removed from feet/leg wounds bilateral. Ag Nitrate used on right foot per MD, no further procedure today. Dr. Jeniffer Enriquez noted labs drawn in the ER & also assessed lungs. Will plan to admit to PCU with telemetry with Dx: CHF. Dr. Jeniffer Enriquez also discussed with pt. H/H 7.0/22.9 & will plan to transfuse PRBC's inpatient. Dr. Raul Johnson accepting admission for patient. Will cont. With current wound care regime to feet/leg wounds as ordered today. Dressings to remain in place to right hip, Right Ischial & thoracic wounds & will plan for dressings changes once inpatient & pt. able to tolerate. ATILIO mattress to be ordered for inpt. Admission. Pt. To cont. With prevlon boots for off-loading of bilateral feet. F/u in [facility] ER as directed once discharged from hospital. Discharge instructions reviewed with patient, all questions answered, copy given to patient. Dressings were applied to all wounds per M.D. Instructions at this visit.

## 2018-09-05 NOTE — PROGRESS NOTES
Spoke with Scarlett Najera in PCU & report given. Scarlett Najera states he will call [facility] & let them know, ok to transfer to PCU when echo completed.

## 2018-09-05 NOTE — PROGRESS NOTES
Spoke with Bea Cardozo at [phone] to request direct admit to PCU with telemetry, Isolation room, Dx: CHF. Dr. Ashanti Daniels attending & Dr. Christophe Valadez has been in touch & Dr. Ashanti Daniels accepting admission. Will await to hear a bed assignment.

## 2018-09-06 LAB
ANION GAP SERPL CALCULATED.3IONS-SCNC: 16 MMOL/L (ref 3–16)
BASOPHILS ABSOLUTE: 0.1 K/UL (ref 0–0.2)
BASOPHILS RELATIVE PERCENT: 1 %
BUN BLDV-MCNC: 37 MG/DL (ref 7–20)
CALCIUM SERPL-MCNC: 8.4 MG/DL (ref 8.3–10.6)
CHLORIDE BLD-SCNC: 94 MMOL/L (ref 99–110)
CO2: 23 MMOL/L (ref 21–32)
CREAT SERPL-MCNC: 1.5 MG/DL (ref 0.9–1.3)
EOSINOPHILS ABSOLUTE: 0.1 K/UL (ref 0–0.6)
EOSINOPHILS RELATIVE PERCENT: 1.3 %
GFR AFRICAN AMERICAN: 60
GFR NON-AFRICAN AMERICAN: 49
GLUCOSE BLD-MCNC: 136 MG/DL (ref 70–99)
GLUCOSE BLD-MCNC: 262 MG/DL (ref 70–99)
GLUCOSE BLD-MCNC: 273 MG/DL (ref 70–99)
GLUCOSE BLD-MCNC: 91 MG/DL (ref 70–99)
GLUCOSE BLD-MCNC: 94 MG/DL (ref 70–99)
GLUCOSE BLD-MCNC: 95 MG/DL (ref 70–99)
GLUCOSE BLD-MCNC: 97 MG/DL (ref 70–99)
HCT VFR BLD CALC: 24.6 % (ref 40.5–52.5)
HEMOGLOBIN: 7.9 G/DL (ref 13.5–17.5)
LYMPHOCYTES ABSOLUTE: 1.3 K/UL (ref 1–5.1)
LYMPHOCYTES RELATIVE PERCENT: 13.9 %
MAGNESIUM: 2.1 MG/DL (ref 1.8–2.4)
MCH RBC QN AUTO: 23.9 PG (ref 26–34)
MCHC RBC AUTO-ENTMCNC: 32.1 G/DL (ref 31–36)
MCV RBC AUTO: 74.3 FL (ref 80–100)
MONOCYTES ABSOLUTE: 0.8 K/UL (ref 0–1.3)
MONOCYTES RELATIVE PERCENT: 8.4 %
NEUTROPHILS ABSOLUTE: 7.2 K/UL (ref 1.7–7.7)
NEUTROPHILS RELATIVE PERCENT: 75.4 %
PDW BLD-RTO: 22.7 % (ref 12.4–15.4)
PERFORMED ON: ABNORMAL
PERFORMED ON: NORMAL
PLATELET # BLD: 494 K/UL (ref 135–450)
PMV BLD AUTO: 8.2 FL (ref 5–10.5)
POTASSIUM SERPL-SCNC: 4.1 MMOL/L (ref 3.5–5.1)
PRO-BNP: ABNORMAL PG/ML (ref 0–124)
RBC # BLD: 3.31 M/UL (ref 4.2–5.9)
SODIUM BLD-SCNC: 133 MMOL/L (ref 136–145)
WBC # BLD: 9.5 K/UL (ref 4–11)

## 2018-09-06 PROCEDURE — 36415 COLL VENOUS BLD VENIPUNCTURE: CPT

## 2018-09-06 PROCEDURE — 6370000000 HC RX 637 (ALT 250 FOR IP): Performed by: INTERNAL MEDICINE

## 2018-09-06 PROCEDURE — 85025 COMPLETE CBC W/AUTO DIFF WBC: CPT

## 2018-09-06 PROCEDURE — 6360000002 HC RX W HCPCS: Performed by: INTERNAL MEDICINE

## 2018-09-06 PROCEDURE — 80048 BASIC METABOLIC PNL TOTAL CA: CPT

## 2018-09-06 PROCEDURE — 83880 ASSAY OF NATRIURETIC PEPTIDE: CPT

## 2018-09-06 PROCEDURE — 2700000000 HC OXYGEN THERAPY PER DAY

## 2018-09-06 PROCEDURE — 94761 N-INVAS EAR/PLS OXIMETRY MLT: CPT

## 2018-09-06 PROCEDURE — 83735 ASSAY OF MAGNESIUM: CPT

## 2018-09-06 PROCEDURE — 2580000003 HC RX 258: Performed by: INTERNAL MEDICINE

## 2018-09-06 PROCEDURE — 2060000000 HC ICU INTERMEDIATE R&B

## 2018-09-06 PROCEDURE — 99232 SBSQ HOSP IP/OBS MODERATE 35: CPT | Performed by: INTERNAL MEDICINE

## 2018-09-06 RX ORDER — LISINOPRIL 5 MG/1
5 TABLET ORAL DAILY
Status: DISCONTINUED | OUTPATIENT
Start: 2018-09-07 | End: 2018-09-07 | Stop reason: HOSPADM

## 2018-09-06 RX ORDER — TORSEMIDE 20 MG/1
20 TABLET ORAL DAILY
Status: DISCONTINUED | OUTPATIENT
Start: 2018-09-06 | End: 2018-09-07 | Stop reason: HOSPADM

## 2018-09-06 RX ADMIN — METFORMIN HYDROCHLORIDE 500 MG: 500 TABLET ORAL at 08:36

## 2018-09-06 RX ADMIN — METFORMIN HYDROCHLORIDE 500 MG: 500 TABLET ORAL at 16:29

## 2018-09-06 RX ADMIN — ROSUVASTATIN CALCIUM 20 MG: 10 TABLET, FILM COATED ORAL at 22:29

## 2018-09-06 RX ADMIN — APIXABAN 5 MG: 5 TABLET, FILM COATED ORAL at 22:29

## 2018-09-06 RX ADMIN — INSULIN LISPRO 3 UNITS: 100 INJECTION, SOLUTION INTRAVENOUS; SUBCUTANEOUS at 16:29

## 2018-09-06 RX ADMIN — INSULIN LISPRO 2 UNITS: 100 INJECTION, SOLUTION INTRAVENOUS; SUBCUTANEOUS at 22:30

## 2018-09-06 RX ADMIN — CLOPIDOGREL BISULFATE 75 MG: 75 TABLET ORAL at 08:36

## 2018-09-06 RX ADMIN — DOCUSATE SODIUM 100 MG: 100 CAPSULE, LIQUID FILLED ORAL at 22:29

## 2018-09-06 RX ADMIN — APIXABAN 5 MG: 5 TABLET, FILM COATED ORAL at 08:36

## 2018-09-06 RX ADMIN — METOPROLOL SUCCINATE 100 MG: 50 TABLET, EXTENDED RELEASE ORAL at 08:36

## 2018-09-06 RX ADMIN — ASPIRIN 81 MG: 81 TABLET, CHEWABLE ORAL at 08:36

## 2018-09-06 RX ADMIN — Medication 10 ML: at 08:36

## 2018-09-06 RX ADMIN — PANTOPRAZOLE SODIUM 40 MG: 40 TABLET, DELAYED RELEASE ORAL at 05:15

## 2018-09-06 RX ADMIN — DOCUSATE SODIUM 100 MG: 100 CAPSULE, LIQUID FILLED ORAL at 08:36

## 2018-09-06 RX ADMIN — INSULIN GLARGINE 40 UNITS: 100 INJECTION, SOLUTION SUBCUTANEOUS at 22:29

## 2018-09-06 RX ADMIN — Medication 10 ML: at 22:30

## 2018-09-06 RX ADMIN — IRON SUCROSE 100 MG: 20 INJECTION, SOLUTION INTRAVENOUS at 09:28

## 2018-09-06 RX ADMIN — TORSEMIDE 20 MG: 20 TABLET ORAL at 14:27
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
## 2018-09-06 NOTE — PROGRESS NOTES
Report given to [facility], RN. Care transferred at this time.     Buddy Amin RN

## 2018-09-07 VITALS
TEMPERATURE: 96.9 F | OXYGEN SATURATION: 98 % | DIASTOLIC BLOOD PRESSURE: 80 MMHG | BODY MASS INDEX: 39.74 KG/M2 | SYSTOLIC BLOOD PRESSURE: 122 MMHG | WEIGHT: 309.5 LBS | RESPIRATION RATE: 18 BRPM | HEART RATE: 86 BPM

## 2018-09-07 LAB
ANION GAP SERPL CALCULATED.3IONS-SCNC: 14 MMOL/L (ref 3–16)
BASOPHILS ABSOLUTE: 0.1 K/UL (ref 0–0.2)
BASOPHILS RELATIVE PERCENT: 1.2 %
BUN BLDV-MCNC: 38 MG/DL (ref 7–20)
CALCIUM SERPL-MCNC: 8.5 MG/DL (ref 8.3–10.6)
CHLORIDE BLD-SCNC: 97 MMOL/L (ref 99–110)
CO2: 25 MMOL/L (ref 21–32)
CREAT SERPL-MCNC: 1.4 MG/DL (ref 0.9–1.3)
EOSINOPHILS ABSOLUTE: 0.2 K/UL (ref 0–0.6)
EOSINOPHILS RELATIVE PERCENT: 1.9 %
GFR AFRICAN AMERICAN: >60
GFR NON-AFRICAN AMERICAN: 53
GLUCOSE BLD-MCNC: 107 MG/DL (ref 70–99)
GLUCOSE BLD-MCNC: 135 MG/DL (ref 70–99)
GLUCOSE BLD-MCNC: 96 MG/DL (ref 70–99)
HAPTOGLOBIN: 290 MG/DL (ref 30–200)
HCT VFR BLD CALC: 25.1 % (ref 40.5–52.5)
HEMOGLOBIN: 7.8 G/DL (ref 13.5–17.5)
LACTATE DEHYDROGENASE: 189 U/L (ref 100–190)
LYMPHOCYTES ABSOLUTE: 1.2 K/UL (ref 1–5.1)
LYMPHOCYTES RELATIVE PERCENT: 13.3 %
MAGNESIUM: 2 MG/DL (ref 1.8–2.4)
MCH RBC QN AUTO: 23.5 PG (ref 26–34)
MCHC RBC AUTO-ENTMCNC: 31.2 G/DL (ref 31–36)
MCV RBC AUTO: 75.3 FL (ref 80–100)
MONOCYTES ABSOLUTE: 0.8 K/UL (ref 0–1.3)
MONOCYTES RELATIVE PERCENT: 9 %
MRSA CULTURE ONLY: ABNORMAL
MRSA CULTURE ONLY: ABNORMAL
NEUTROPHILS ABSOLUTE: 6.5 K/UL (ref 1.7–7.7)
NEUTROPHILS RELATIVE PERCENT: 74.6 %
ORGANISM: ABNORMAL
PDW BLD-RTO: 22.5 % (ref 12.4–15.4)
PERFORMED ON: ABNORMAL
PERFORMED ON: NORMAL
PLATELET # BLD: 440 K/UL (ref 135–450)
PMV BLD AUTO: 8.5 FL (ref 5–10.5)
POTASSIUM SERPL-SCNC: 3.7 MMOL/L (ref 3.5–5.1)
RBC # BLD: 3.34 M/UL (ref 4.2–5.9)
SODIUM BLD-SCNC: 136 MMOL/L (ref 136–145)
WBC # BLD: 8.7 K/UL (ref 4–11)

## 2018-09-07 PROCEDURE — 85025 COMPLETE CBC W/AUTO DIFF WBC: CPT

## 2018-09-07 PROCEDURE — 80048 BASIC METABOLIC PNL TOTAL CA: CPT

## 2018-09-07 PROCEDURE — 6370000000 HC RX 637 (ALT 250 FOR IP): Performed by: INTERNAL MEDICINE

## 2018-09-07 PROCEDURE — 2580000003 HC RX 258: Performed by: INTERNAL MEDICINE

## 2018-09-07 PROCEDURE — 83020 HEMOGLOBIN ELECTROPHORESIS: CPT

## 2018-09-07 PROCEDURE — 2500000003 HC RX 250 WO HCPCS: Performed by: INTERNAL MEDICINE

## 2018-09-07 PROCEDURE — 83735 ASSAY OF MAGNESIUM: CPT

## 2018-09-07 PROCEDURE — 36415 COLL VENOUS BLD VENIPUNCTURE: CPT

## 2018-09-07 PROCEDURE — 6360000002 HC RX W HCPCS: Performed by: INTERNAL MEDICINE

## 2018-09-07 PROCEDURE — 6360000002 HC RX W HCPCS: Performed by: PHYSICIAN ASSISTANT

## 2018-09-07 PROCEDURE — 83010 ASSAY OF HAPTOGLOBIN QUANT: CPT

## 2018-09-07 PROCEDURE — 83615 LACTATE (LD) (LDH) ENZYME: CPT

## 2018-09-07 RX ADMIN — DOCUSATE SODIUM 100 MG: 100 CAPSULE, LIQUID FILLED ORAL at 10:11

## 2018-09-07 RX ADMIN — Medication 10 ML: at 10:12

## 2018-09-07 RX ADMIN — METOPROLOL SUCCINATE 100 MG: 50 TABLET, EXTENDED RELEASE ORAL at 10:10

## 2018-09-07 RX ADMIN — TORSEMIDE 20 MG: 20 TABLET ORAL at 10:11

## 2018-09-07 RX ADMIN — METFORMIN HYDROCHLORIDE 500 MG: 500 TABLET ORAL at 10:11

## 2018-09-07 RX ADMIN — MAGNESIUM OXIDE TAB 400 MG (241.3 MG ELEMENTAL MG) 400 MG: 400 (241.3 MG) TAB at 10:11

## 2018-09-07 RX ADMIN — MICONAZOLE NITRATE: 2 POWDER TOPICAL at 10:13

## 2018-09-07 RX ADMIN — PANTOPRAZOLE SODIUM 40 MG: 40 TABLET, DELAYED RELEASE ORAL at 07:45

## 2018-09-07 RX ADMIN — FUROSEMIDE 40 MG: 10 INJECTION, SOLUTION INTRAMUSCULAR; INTRAVENOUS at 10:12

## 2018-09-07 RX ADMIN — IRON SUCROSE 100 MG: 20 INJECTION, SOLUTION INTRAVENOUS at 11:23

## 2018-09-07 RX ADMIN — CLOPIDOGREL BISULFATE 75 MG: 75 TABLET ORAL at 10:11

## 2018-09-07 RX ADMIN — APIXABAN 5 MG: 5 TABLET, FILM COATED ORAL at 10:11

## 2018-09-07 RX ADMIN — ASPIRIN 81 MG: 81 TABLET, CHEWABLE ORAL at 10:11

## 2018-09-07 RX ADMIN — LISINOPRIL 5 MG: 5 TABLET ORAL at 10:11
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
## 2018-09-07 NOTE — CONSULTS
anticoagulants; all of these factors combined are believed to be resulting in his low H&H numbers. Mr. Henrry Salazar reports his doctor has set a goal of a hemoglobin of 9 g/dL for him. He already takes iron supplements, MDs plan to begin weekly iron infusions. Mr. Henrry Salazar has poor vascularization for IV cannulation and the prospect of placing a port-a-cath has been discussed. This will require 3 days off of Eliquis and 7 days off of Plavix. Mr. Henrry Salazar has had PICC lines placed before but the viability of his accessible veins and the infection risk pose obstacles to this plan of care. Mr. Henrry Salazar is an advocate for his own care and asked questions about how to use his diet to support his anemia and promote wound healing. We discussed a few options such as; protein and collagen powder supplements he can add to his foods and fluids. He limits his red meat intake as he has a family history of colon cancer and heart disease. He is also aware that many of the \"dark green leafy\" vegetables that would be high in iron also come with a heavy Vitamin K dose which is tries to avoid since he is taking anticoagulants. Mr. Henrry Salazar is well known to the CHF RN team but was once again provided with both written and verbal education on CHF signs/symptoms, causes, discharge medications, daily weights, low sodium diet, activity, and follow-up. HF zone self management written instructions provided/reviewed and emphasized to call MD when \"yellow\" zone triggers are met. Early communication about the onset of symptoms with his MD can prevent future hospitalizations. Instructed him to call the doctor post discharge if he experiences increasing worsening shortness of breath, worsening chest pains, increased swelling from their baseline, worsening cough, or weight gain of >2-3 lbs in a day/ 5 lb gain in a week. Also advised to call the doctor if he feels dizzy, increased fatigue, decreased or difficulty urinating.      Instructed on and

## 2018-09-10 ENCOUNTER — TELEPHONE (OUTPATIENT)
Dept: TELEMETRY | Age: 53
End: 2018-09-10

## 2018-09-11 LAB
AFB CULTURE (MYCOBACTERIA): NORMAL
AFB SMEAR: NORMAL
HEMOGLOBIN ELECTROPHORESIS: NORMAL
HGB ELECTROPHORESIS INTERP: NORMAL

## 2018-09-12 ENCOUNTER — HOSPITAL ENCOUNTER (OUTPATIENT)
Dept: WOUND CARE | Age: 53
Discharge: HOME | End: 2018-09-12
Payer: MEDICARE

## 2018-09-12 VITALS
SYSTOLIC BLOOD PRESSURE: 97 MMHG | HEART RATE: 78 BPM | TEMPERATURE: 97.9 F | RESPIRATION RATE: 18 BRPM | DIASTOLIC BLOOD PRESSURE: 66 MMHG

## 2018-09-12 PROCEDURE — 2780000010 HC IMPLANT OTHER

## 2018-09-12 PROCEDURE — 97598 DBRDMT OPN WND ADDL 20CM/<: CPT

## 2018-09-12 PROCEDURE — 17250 CHEM CAUT OF GRANLTJ TISSUE: CPT | Performed by: INTERNAL MEDICINE

## 2018-09-12 PROCEDURE — 11043 DBRDMT MUSC&/FSCA 1ST 20/<: CPT

## 2018-09-12 PROCEDURE — 11046 DBRDMT MUSC&/FSCA EA ADDL: CPT

## 2018-09-12 PROCEDURE — 97597 DBRDMT OPN WND 1ST 20 CM/<: CPT | Performed by: INTERNAL MEDICINE

## 2018-09-12 PROCEDURE — 97605 NEG PRS WND THER DME<=50SQCM: CPT

## 2018-09-12 PROCEDURE — 97597 DBRDMT OPN WND 1ST 20 CM/<: CPT

## 2018-09-12 PROCEDURE — 17250 CHEM CAUT OF GRANLTJ TISSUE: CPT

## 2018-09-12 PROCEDURE — 97598 DBRDMT OPN WND ADDL 20CM/<: CPT | Performed by: INTERNAL MEDICINE

## 2018-09-12 PROCEDURE — 11043 DBRDMT MUSC&/FSCA 1ST 20/<: CPT | Performed by: INTERNAL MEDICINE

## 2018-09-12 PROCEDURE — 11046 DBRDMT MUSC&/FSCA EA ADDL: CPT | Performed by: INTERNAL MEDICINE

## 2018-09-12 RX ORDER — LIDOCAINE 40 MG/G
CREAM TOPICAL PRN
Status: DISCONTINUED | OUTPATIENT
Start: 2018-09-12 | End: 2018-09-13 | Stop reason: HOSPADM

## 2018-09-12 NOTE — PLAN OF CARE
Problem: Wound:  Goal: Will show signs of wound healing; wound closure and no evidence of infection  Will show signs of wound healing; wound closure and no evidence of infection   Outcome: Ongoing  Left leg & foot wounds stable, no procedure, cont. With weekly dressing as ordered & football for off-loading. Right foot with surgical dehiscence distally & some healed surgical line between the original heel & lateral foot wound. Right foot wounds debrided per MD. Will change to using Triad & AgAlginate to wounds, HHC to change on Friday & Monday. Double F tubigrip on RLE for edema control as ordered. Cont. With prevlon boots for off-loading. Back wound stable, debridement per MD & pt. Tolerated well, will cont. With current wound care regime with NPWT. Ischial wound stable, although fragile & bleeding, debridement & Ag Nitrate per MD, cont. With current wound care regime. Pt. Does state that he is spending more time in bed. Reinforced importance of frequent repositioning. [facility]. Discharge instructions reviewed with patient, all questions answered, copy given to patient. Dressings were applied to all wounds per M.D. Instructions at this visit.

## 2018-09-13 ENCOUNTER — OFFICE VISIT (OUTPATIENT)
Dept: INTERNAL MEDICINE CLINIC | Age: 53
End: 2018-09-13

## 2018-09-13 ENCOUNTER — TELEPHONE (OUTPATIENT)
Dept: INTERNAL MEDICINE CLINIC | Age: 53
End: 2018-09-13

## 2018-09-13 VITALS
HEIGHT: 74 IN | HEART RATE: 70 BPM | BODY MASS INDEX: 39.74 KG/M2 | SYSTOLIC BLOOD PRESSURE: 100 MMHG | DIASTOLIC BLOOD PRESSURE: 65 MMHG | RESPIRATION RATE: 18 BRPM

## 2018-09-13 DIAGNOSIS — D50.8 OTHER IRON DEFICIENCY ANEMIA: ICD-10-CM

## 2018-09-13 DIAGNOSIS — L89.214 PRESSURE ULCER OF RIGHT HIP, STAGE 4 (HCC): ICD-10-CM

## 2018-09-13 DIAGNOSIS — Z09 HOSPITAL DISCHARGE FOLLOW-UP: Primary | ICD-10-CM

## 2018-09-13 DIAGNOSIS — I50.22 CHRONIC SYSTOLIC HEART FAILURE (HCC): Chronic | ICD-10-CM

## 2018-09-13 DIAGNOSIS — G47.09 OTHER INSOMNIA: ICD-10-CM

## 2018-09-13 DIAGNOSIS — G82.21 PARAPLEGIA, COMPLETE (HCC): ICD-10-CM

## 2018-09-13 PROCEDURE — 1111F DSCHRG MED/CURRENT MED MERGE: CPT | Performed by: INTERNAL MEDICINE

## 2018-09-13 PROCEDURE — 3017F COLORECTAL CA SCREEN DOC REV: CPT | Performed by: INTERNAL MEDICINE

## 2018-09-13 PROCEDURE — 99213 OFFICE O/P EST LOW 20 MIN: CPT | Performed by: INTERNAL MEDICINE

## 2018-09-13 PROCEDURE — 1036F TOBACCO NON-USER: CPT | Performed by: INTERNAL MEDICINE

## 2018-09-13 PROCEDURE — G8427 DOCREV CUR MEDS BY ELIG CLIN: HCPCS | Performed by: INTERNAL MEDICINE

## 2018-09-13 PROCEDURE — G8598 ASA/ANTIPLAT THER USED: HCPCS | Performed by: INTERNAL MEDICINE

## 2018-09-13 PROCEDURE — G8417 CALC BMI ABV UP PARAM F/U: HCPCS | Performed by: INTERNAL MEDICINE

## 2018-09-13 RX ORDER — TORSEMIDE 20 MG/1
40 TABLET ORAL DAILY
Qty: 90 TABLET | Refills: 1 | Status: SHIPPED | OUTPATIENT
Start: 2018-09-13 | End: 2019-02-22 | Stop reason: SDUPTHER

## 2018-09-13 RX ORDER — TRAZODONE HYDROCHLORIDE 50 MG/1
75 TABLET ORAL NIGHTLY
Qty: 135 TABLET | Refills: 1 | Status: SHIPPED | OUTPATIENT
Start: 2018-09-13 | End: 2019-01-02 | Stop reason: ALTCHOICE

## 2018-09-13 ASSESSMENT — ENCOUNTER SYMPTOMS
RHINORRHEA: 0
SHORTNESS OF BREATH: 0
COLOR CHANGE: 0
CHEST TIGHTNESS: 0

## 2018-09-13 NOTE — TELEPHONE ENCOUNTER
----- Message from Supriya Singh MD sent at 9/13/2018  5:13 PM EDT -----  Its for out pt infusion here    ----- Message -----  From: Jes Cinda  Sent: 9/13/2018   4:59 PM  To: MD Andrew Armenta called stating they don't dispense iv medication- pt's venofer. Please advise.
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
## 2018-09-17 ENCOUNTER — TELEPHONE (OUTPATIENT)
Dept: SURGERY | Age: 53
End: 2018-09-17

## 2018-09-17 ENCOUNTER — TELEPHONE (OUTPATIENT)
Dept: INTERNAL MEDICINE CLINIC | Age: 53
End: 2018-09-17

## 2018-09-17 RX ORDER — 0.9 % SODIUM CHLORIDE 0.9 %
10 VIAL (ML) INJECTION ONCE
Status: CANCELLED | OUTPATIENT
Start: 2018-09-17

## 2018-09-17 RX ORDER — SODIUM CHLORIDE 9 MG/ML
INJECTION, SOLUTION INTRAVENOUS ONCE
Status: CANCELLED | OUTPATIENT
Start: 2018-09-17

## 2018-09-17 NOTE — TELEPHONE ENCOUNTER
I called pt to get him scheduled, was told that Dr. Johnie Bruce did not want him to go off of blood thinners, and that it will be a year in October from his stent placement. Advised pt that he needs to follow up with Dr. Johnie Bruce and or his cardiologist @  then we can schedule accordingly.

## 2018-09-19 ENCOUNTER — HOSPITAL ENCOUNTER (OUTPATIENT)
Age: 53
Setting detail: INFUSION SERIES
Discharge: HOME | End: 2018-09-19
Payer: MEDICARE

## 2018-09-19 ENCOUNTER — HOSPITAL ENCOUNTER (OUTPATIENT)
Dept: NURSING | Age: 53
Setting detail: INFUSION SERIES
Discharge: HOME | End: 2018-09-19
Payer: MEDICARE

## 2018-09-19 ENCOUNTER — HOSPITAL ENCOUNTER (OUTPATIENT)
Dept: WOUND CARE | Age: 53
Discharge: HOME | End: 2018-09-19
Payer: MEDICARE

## 2018-09-19 VITALS
TEMPERATURE: 98.1 F | HEART RATE: 85 BPM | DIASTOLIC BLOOD PRESSURE: 65 MMHG | WEIGHT: 258.8 LBS | HEIGHT: 74 IN | SYSTOLIC BLOOD PRESSURE: 112 MMHG | BODY MASS INDEX: 33.21 KG/M2 | RESPIRATION RATE: 18 BRPM

## 2018-09-19 VITALS
HEART RATE: 82 BPM | HEIGHT: 74 IN | BODY MASS INDEX: 39.66 KG/M2 | WEIGHT: 309 LBS | RESPIRATION RATE: 18 BRPM | DIASTOLIC BLOOD PRESSURE: 50 MMHG | SYSTOLIC BLOOD PRESSURE: 79 MMHG

## 2018-09-19 PROCEDURE — 11043 DBRDMT MUSC&/FSCA 1ST 20/<: CPT | Performed by: INTERNAL MEDICINE

## 2018-09-19 PROCEDURE — 11046 DBRDMT MUSC&/FSCA EA ADDL: CPT

## 2018-09-19 PROCEDURE — 97597 DBRDMT OPN WND 1ST 20 CM/<: CPT

## 2018-09-19 PROCEDURE — 6360000002 HC RX W HCPCS: Performed by: INTERNAL MEDICINE

## 2018-09-19 PROCEDURE — 17250 CHEM CAUT OF GRANLTJ TISSUE: CPT

## 2018-09-19 PROCEDURE — 96365 THER/PROPH/DIAG IV INF INIT: CPT

## 2018-09-19 PROCEDURE — 97598 DBRDMT OPN WND ADDL 20CM/<: CPT

## 2018-09-19 PROCEDURE — 17250 CHEM CAUT OF GRANLTJ TISSUE: CPT | Performed by: INTERNAL MEDICINE

## 2018-09-19 PROCEDURE — 2580000003 HC RX 258: Performed by: INTERNAL MEDICINE

## 2018-09-19 PROCEDURE — 97597 DBRDMT OPN WND 1ST 20 CM/<: CPT | Performed by: INTERNAL MEDICINE

## 2018-09-19 PROCEDURE — 11043 DBRDMT MUSC&/FSCA 1ST 20/<: CPT

## 2018-09-19 PROCEDURE — 11046 DBRDMT MUSC&/FSCA EA ADDL: CPT | Performed by: INTERNAL MEDICINE

## 2018-09-19 PROCEDURE — 97605 NEG PRS WND THER DME<=50SQCM: CPT

## 2018-09-19 PROCEDURE — 97598 DBRDMT OPN WND ADDL 20CM/<: CPT | Performed by: INTERNAL MEDICINE

## 2018-09-19 RX ORDER — LIDOCAINE 40 MG/G
CREAM TOPICAL ONCE
Status: DISCONTINUED | OUTPATIENT
Start: 2018-09-19 | End: 2018-09-20 | Stop reason: HOSPADM

## 2018-09-19 RX ORDER — SODIUM CHLORIDE 0.9 % (FLUSH) 0.9 %
10 SYRINGE (ML) INJECTION PRN
Status: DISCONTINUED | OUTPATIENT
Start: 2018-09-19 | End: 2018-09-20 | Stop reason: HOSPADM

## 2018-09-19 RX ORDER — SODIUM CHLORIDE 9 MG/ML
INJECTION, SOLUTION INTRAVENOUS ONCE
Status: COMPLETED | OUTPATIENT
Start: 2018-09-19 | End: 2018-09-19

## 2018-09-19 RX ADMIN — Medication 10 ML: at 09:47

## 2018-09-19 RX ADMIN — SODIUM CHLORIDE: 9 INJECTION, SOLUTION INTRAVENOUS at 09:47

## 2018-09-19 RX ADMIN — IRON SUCROSE 200 MG: 20 INJECTION, SOLUTION INTRAVENOUS at 09:47

## 2018-09-19 ASSESSMENT — PAIN DESCRIPTION - PAIN TYPE: TYPE: CHRONIC PAIN

## 2018-09-19 ASSESSMENT — PAIN DESCRIPTION - FREQUENCY: FREQUENCY: CONTINUOUS

## 2018-09-19 ASSESSMENT — PAIN DESCRIPTION - DESCRIPTORS: DESCRIPTORS: BURNING

## 2018-09-19 ASSESSMENT — PAIN DESCRIPTION - LOCATION: LOCATION: SHOULDER

## 2018-09-19 ASSESSMENT — PAIN SCALES - GENERAL
PAINLEVEL_OUTOF10: 3
PAINLEVEL_OUTOF10: 0

## 2018-09-19 ASSESSMENT — PAIN DESCRIPTION - ONSET: ONSET: ON-GOING

## 2018-09-19 ASSESSMENT — PAIN DESCRIPTION - ORIENTATION: ORIENTATION: LEFT

## 2018-09-19 ASSESSMENT — PAIN DESCRIPTION - PROGRESSION: CLINICAL_PROGRESSION: NOT CHANGED

## 2018-09-19 ASSESSMENT — PAIN DESCRIPTION - DIRECTION: RADIATING_TOWARDS: DENIES

## 2018-09-19 NOTE — PROGRESS NOTES
Venofer infused and tubing flushed with NS without any problems  IV removed  Cath tip intact  Pressure then pressure dressing was applied and secured with a coban dressing  Site unremarkable  Discharge instructions reviewed with pt and a copy given  Understanding verbalized Pt is now going to wound care for his scheduled appointment via his motorized w/c in stable condition

## 2018-09-19 NOTE — PROGRESS NOTES
Pt here via motorized w/c for a venofer infusion  Pt reports that he was just discharged from the hospital and they gave him a couple doses of iron while he was there  Pt denies having any side effects from the iron  Pt denies the need for education on the iron at present time No c/o's voiced   IV # 22 started on 1st attempt to RFA per myself without difficulty  Pt essie well  venofer 200 mg IVPB started  Pt essie well  Will monitor

## 2018-09-20 RX ORDER — SULFAMETHOXAZOLE AND TRIMETHOPRIM 800; 160 MG/1; MG/1
1 TABLET ORAL
Qty: 30 TABLET | Refills: 5 | Status: SHIPPED | OUTPATIENT
Start: 2018-09-21 | End: 2018-11-07

## 2018-09-22 DIAGNOSIS — E11.621 TYPE 2 DIABETES MELLITUS WITH FOOT ULCER, WITH LONG-TERM CURRENT USE OF INSULIN (HCC): ICD-10-CM

## 2018-09-22 DIAGNOSIS — Z79.4 TYPE 2 DIABETES MELLITUS WITH FOOT ULCER, WITH LONG-TERM CURRENT USE OF INSULIN (HCC): ICD-10-CM

## 2018-09-22 DIAGNOSIS — Z79.4 TYPE 2 DIABETES MELLITUS WITH OTHER SKIN ULCER, WITH LONG-TERM CURRENT USE OF INSULIN (HCC): ICD-10-CM

## 2018-09-22 DIAGNOSIS — L97.509 TYPE 2 DIABETES MELLITUS WITH FOOT ULCER, WITH LONG-TERM CURRENT USE OF INSULIN (HCC): ICD-10-CM

## 2018-09-22 DIAGNOSIS — E11.622 TYPE 2 DIABETES MELLITUS WITH OTHER SKIN ULCER, WITH LONG-TERM CURRENT USE OF INSULIN (HCC): ICD-10-CM

## 2018-09-24 ENCOUNTER — TELEPHONE (OUTPATIENT)
Dept: INTERNAL MEDICINE CLINIC | Age: 53
End: 2018-09-24

## 2018-09-24 RX ORDER — INSULIN GLARGINE 100 [IU]/ML
INJECTION, SOLUTION SUBCUTANEOUS
Qty: 30 ML | Refills: 0 | Status: SHIPPED | OUTPATIENT
Start: 2018-09-24 | End: 2018-10-25 | Stop reason: SDUPTHER

## 2018-09-24 NOTE — TELEPHONE ENCOUNTER
Spoke with pharmacy and confirmed that they do have Lantus script. They are only able to partially fill it today, but should be able to fill it in full tomorrow. Pt informed.

## 2018-09-24 NOTE — TELEPHONE ENCOUNTER
----- Message from Yossi Solano sent at 9/24/2018  2:05 PM EDT -----  Contact: pt- [phone]  He said the pharm denied his lantus refill-last appt- [phone] in [place] at [phone]

## 2018-09-25 ENCOUNTER — TELEPHONE (OUTPATIENT)
Dept: INTERNAL MEDICINE CLINIC | Age: 53
End: 2018-09-25

## 2018-09-25 NOTE — PROGRESS NOTES
full thickness, onset 8/16/2017, pressure-Wound Depth (cm) : 1.2  Wound 10/17/14  #8, right ischium, stage 4 PU, onset 7/15/14 (merged with #30), pressure-Wound Depth (cm) : 2.3    The ulcers were then irrigated with normal saline solution. The procedure was completed with much bleeding, and hemostasis was by pressure, by silver nitrate stick, by electro cautery and ORC. The patient tolerated the procedure well, with no significant complications. The patient's level of pain during and after the procedure was monitored, and is noted in the wound documentation flowsheet. To encourage better epithelial cell coverage, I did use AgNO3 to chemically cauterize hypergranulation tissue on the right and left heel ulcer(s), after application of 4% lidocaine topical solution. This was tolerated well, with no pain or skin injury.      Discharge plan:     Treatment in the wound care center today: Wound 10/25/17 #29, right heel & lateral foot, DFU, Mccauley 3 onset 10/25/17, pressure-Dressing/Treatment: ABD, Dry dressing, Silver dressing, Triad hydro (double f tubi w/ heel cut out )  Wound 08/29/18 #35- L distal foot, diabetic, Mccauley 1, onset 8/29/18, surgical -Dressing/Treatment:  (foot ball dressing intact)  Wound 08/29/18 #36- L lateral lower leg, venous, partial thickness, onset 8/29/18, unknown-Dressing/Treatment: Hydrocolloid (double e tubi- ankle to below knee)  Wound 08/07/18 #33, left lateral (mid) foot, Pressure ulcer, stage IV, (onset 8/2018) pressure -Dressing/Treatment: ABD, Dry dressing, Silver dressing, Triad hydro (football dressing)  Wound 09/12/18 #37 Right lateral distal foot, Surgical Dehiscense, full thickness (onset 8/29/18) Surgical-Dressing/Treatment: ABD, Dry dressing, Triad hydro (silver alginate, gauze, kerlix- double f tubi- heel cut out)  Wound 08/16/17 #27- Thoracic spine, dehisced surgical wound, full thickness, onset 8/16/2017, pressure-Dressing/Treatment: Vacuum dressing (sorbact)  Wound 10/17/14

## 2018-09-25 NOTE — TELEPHONE ENCOUNTER
----- Message from Shan Lopez MD sent at 9/25/2018 12:40 PM EDT -----  Contact: pt [phone]  I can fill paper work if that is ok by surgeon    ----- Message -----  From: Ewa Spangler  Sent: 9/25/2018   9:54 AM  To: Shan Lopez MD    Pt is having surgery on his foot 9/27 and needs an h&p tomorrow but he will be at the wound clinic from 10:30-1 tomorrow.     -am

## 2018-09-26 ENCOUNTER — ANESTHESIA EVENT (OUTPATIENT)
Dept: OPERATING ROOM | Age: 53
End: 2018-09-26
Payer: MEDICARE

## 2018-09-26 ENCOUNTER — HOSPITAL ENCOUNTER (OUTPATIENT)
Dept: WOUND CARE | Age: 53
Discharge: HOME | End: 2018-09-26
Payer: MEDICARE

## 2018-09-26 VITALS
HEIGHT: 74 IN | HEART RATE: 71 BPM | SYSTOLIC BLOOD PRESSURE: 95 MMHG | RESPIRATION RATE: 18 BRPM | WEIGHT: 257.1 LBS | BODY MASS INDEX: 33 KG/M2 | TEMPERATURE: 97.4 F | DIASTOLIC BLOOD PRESSURE: 63 MMHG

## 2018-09-26 LAB
BASOPHILS ABSOLUTE: 0.1 K/UL (ref 0–0.2)
BASOPHILS RELATIVE PERCENT: 0.9 %
EOSINOPHILS ABSOLUTE: 0.2 K/UL (ref 0–0.6)
EOSINOPHILS RELATIVE PERCENT: 2.1 %
HCT VFR BLD CALC: 27.8 % (ref 40.5–52.5)
HEMOGLOBIN: 8.6 G/DL (ref 13.5–17.5)
LYMPHOCYTES ABSOLUTE: 1.2 K/UL (ref 1–5.1)
LYMPHOCYTES RELATIVE PERCENT: 12.8 %
MCH RBC QN AUTO: 24 PG (ref 26–34)
MCHC RBC AUTO-ENTMCNC: 31 G/DL (ref 31–36)
MCV RBC AUTO: 77.2 FL (ref 80–100)
MONOCYTES ABSOLUTE: 0.8 K/UL (ref 0–1.3)
MONOCYTES RELATIVE PERCENT: 8.3 %
NEUTROPHILS ABSOLUTE: 6.9 K/UL (ref 1.7–7.7)
NEUTROPHILS RELATIVE PERCENT: 75.9 %
PDW BLD-RTO: 21.9 % (ref 12.4–15.4)
PLATELET # BLD: 489 K/UL (ref 135–450)
PMV BLD AUTO: 8 FL (ref 5–10.5)
RBC # BLD: 3.6 M/UL (ref 4.2–5.9)
WBC # BLD: 9.1 K/UL (ref 4–11)

## 2018-09-26 PROCEDURE — 11045 DBRDMT SUBQ TISS EACH ADDL: CPT | Performed by: INTERNAL MEDICINE

## 2018-09-26 PROCEDURE — 85025 COMPLETE CBC W/AUTO DIFF WBC: CPT

## 2018-09-26 PROCEDURE — 11045 DBRDMT SUBQ TISS EACH ADDL: CPT

## 2018-09-26 PROCEDURE — 17250 CHEM CAUT OF GRANLTJ TISSUE: CPT | Performed by: INTERNAL MEDICINE

## 2018-09-26 PROCEDURE — 97598 DBRDMT OPN WND ADDL 20CM/<: CPT | Performed by: INTERNAL MEDICINE

## 2018-09-26 PROCEDURE — 11044 DBRDMT BONE 1ST 20 SQ CM/<: CPT

## 2018-09-26 PROCEDURE — 11042 DBRDMT SUBQ TIS 1ST 20SQCM/<: CPT | Performed by: INTERNAL MEDICINE

## 2018-09-26 PROCEDURE — 97597 DBRDMT OPN WND 1ST 20 CM/<: CPT

## 2018-09-26 PROCEDURE — 11042 DBRDMT SUBQ TIS 1ST 20SQCM/<: CPT

## 2018-09-26 PROCEDURE — 17250 CHEM CAUT OF GRANLTJ TISSUE: CPT

## 2018-09-26 PROCEDURE — 36415 COLL VENOUS BLD VENIPUNCTURE: CPT

## 2018-09-26 PROCEDURE — 97607 NEG PRS WND THR NDME<=50SQCM: CPT

## 2018-09-26 PROCEDURE — 97597 DBRDMT OPN WND 1ST 20 CM/<: CPT | Performed by: INTERNAL MEDICINE

## 2018-09-26 PROCEDURE — 97598 DBRDMT OPN WND ADDL 20CM/<: CPT

## 2018-09-26 RX ORDER — LIDOCAINE 40 MG/G
CREAM TOPICAL PRN
Status: DISCONTINUED | OUTPATIENT
Start: 2018-09-26 | End: 2018-09-27 | Stop reason: HOSPADM

## 2018-09-26 RX ORDER — CLINDAMYCIN PHOSPHATE 900 MG/50ML
900 INJECTION INTRAVENOUS ONCE
Status: CANCELLED | OUTPATIENT
Start: 2018-09-26 | End: 2018-09-26

## 2018-09-26 ASSESSMENT — PAIN DESCRIPTION - ORIENTATION: ORIENTATION: LEFT;RIGHT

## 2018-09-26 ASSESSMENT — PAIN DESCRIPTION - ONSET: ONSET: ON-GOING

## 2018-09-26 ASSESSMENT — PAIN DESCRIPTION - LOCATION: LOCATION: SHOULDER

## 2018-09-26 ASSESSMENT — PAIN SCALES - GENERAL: PAINLEVEL_OUTOF10: 5

## 2018-09-26 ASSESSMENT — PAIN DESCRIPTION - PROGRESSION: CLINICAL_PROGRESSION: NOT CHANGED

## 2018-09-26 ASSESSMENT — PAIN DESCRIPTION - DESCRIPTORS: DESCRIPTORS: BURNING;CONSTANT

## 2018-09-26 ASSESSMENT — PAIN DESCRIPTION - PAIN TYPE: TYPE: CHRONIC PAIN

## 2018-09-26 ASSESSMENT — PAIN DESCRIPTION - FREQUENCY: FREQUENCY: CONTINUOUS

## 2018-09-27 ENCOUNTER — HOSPITAL ENCOUNTER (OUTPATIENT)
Age: 53
Setting detail: SURGERY ADMIT
Discharge: HOME | End: 2018-09-27
Attending: PODIATRIST
Payer: MEDICARE

## 2018-09-27 ENCOUNTER — ANESTHESIA (OUTPATIENT)
Dept: OPERATING ROOM | Age: 53
End: 2018-09-27
Payer: MEDICARE

## 2018-09-27 VITALS — OXYGEN SATURATION: 99 % | SYSTOLIC BLOOD PRESSURE: 123 MMHG | DIASTOLIC BLOOD PRESSURE: 70 MMHG

## 2018-09-27 LAB
GLUCOSE BLD-MCNC: 100 MG/DL (ref 70–99)
GLUCOSE BLD-MCNC: 53 MG/DL (ref 70–99)
GLUCOSE BLD-MCNC: 76 MG/DL (ref 70–99)
PERFORMED ON: ABNORMAL
PERFORMED ON: ABNORMAL
PERFORMED ON: NORMAL

## 2018-09-27 PROCEDURE — 3600000004 HC SURGERY LEVEL 4 BASE: Performed by: PODIATRIST

## 2018-09-27 PROCEDURE — 6360000002 HC RX W HCPCS: Performed by: NURSE ANESTHETIST, CERTIFIED REGISTERED

## 2018-09-27 PROCEDURE — 3600000014 HC SURGERY LEVEL 4 ADDTL 15MIN: Performed by: PODIATRIST

## 2018-09-27 PROCEDURE — 7100000000 HC PACU RECOVERY - FIRST 15 MIN: Performed by: PODIATRIST

## 2018-09-27 PROCEDURE — 7100000001 HC PACU RECOVERY - ADDTL 15 MIN: Performed by: PODIATRIST

## 2018-09-27 PROCEDURE — 2580000003 HC RX 258: Performed by: PODIATRIST

## 2018-09-27 PROCEDURE — 2500000003 HC RX 250 WO HCPCS: Performed by: PODIATRIST

## 2018-09-27 PROCEDURE — 2580000003 HC RX 258: Performed by: ANESTHESIOLOGY

## 2018-09-27 PROCEDURE — 3700000000 HC ANESTHESIA ATTENDED CARE: Performed by: PODIATRIST

## 2018-09-27 PROCEDURE — 2580000003 HC RX 258: Performed by: NURSE ANESTHETIST, CERTIFIED REGISTERED

## 2018-09-27 PROCEDURE — 3700000001 HC ADD 15 MINUTES (ANESTHESIA): Performed by: PODIATRIST

## 2018-09-27 PROCEDURE — 2709999900 HC NON-CHARGEABLE SUPPLY: Performed by: PODIATRIST

## 2018-09-27 DEVICE — INTEGRA® MESHED BILAYER WOUND MATRIX 4 IN*5 IN (10 CM*12.5 CM)
Type: IMPLANTABLE DEVICE | Site: FOOT | Status: FUNCTIONAL
Brand: INTEGRA®

## 2018-09-27 DEVICE — GRAFT HUM TISS W5XL5CM PLCNTA MEM CRYOPRESERVED CHORION: Type: IMPLANTABLE DEVICE | Site: FOOT | Status: FUNCTIONAL

## 2018-09-27 DEVICE — GRAFT HUM TISS W2XL4CM CRYOPRESERVED PLCNTA TISS UMB AMNION: Type: IMPLANTABLE DEVICE | Site: FOOT | Status: FUNCTIONAL

## 2018-09-27 DEVICE — GRAFT HUM TISS W3XL4CM PLCNTA MEM CRYOPRESERVED CHORION: Type: IMPLANTABLE DEVICE | Site: FOOT | Status: FUNCTIONAL

## 2018-09-27 RX ORDER — DEXAMETHASONE SODIUM PHOSPHATE 4 MG/ML
INJECTION, SOLUTION INTRA-ARTICULAR; INTRALESIONAL; INTRAMUSCULAR; INTRAVENOUS; SOFT TISSUE PRN
Status: DISCONTINUED | OUTPATIENT
Start: 2018-09-27 | End: 2018-09-27 | Stop reason: SDUPTHER

## 2018-09-27 RX ORDER — SODIUM CHLORIDE, SODIUM LACTATE, POTASSIUM CHLORIDE, CALCIUM CHLORIDE 600; 310; 30; 20 MG/100ML; MG/100ML; MG/100ML; MG/100ML
INJECTION, SOLUTION INTRAVENOUS CONTINUOUS
Status: DISCONTINUED | OUTPATIENT
Start: 2018-09-27 | End: 2018-10-03 | Stop reason: HOSPADM

## 2018-09-27 RX ORDER — LABETALOL HYDROCHLORIDE 5 MG/ML
5 INJECTION, SOLUTION INTRAVENOUS EVERY 10 MIN PRN
Status: DISCONTINUED | OUTPATIENT
Start: 2018-09-27 | End: 2018-10-03 | Stop reason: HOSPADM

## 2018-09-27 RX ORDER — MEPERIDINE HYDROCHLORIDE 25 MG/ML
12.5 INJECTION INTRAMUSCULAR; INTRAVENOUS; SUBCUTANEOUS EVERY 5 MIN PRN
Status: DISCONTINUED | OUTPATIENT
Start: 2018-09-27 | End: 2018-10-03 | Stop reason: HOSPADM

## 2018-09-27 RX ORDER — DEXTROSE MONOHYDRATE 25 G/50ML
INJECTION, SOLUTION INTRAVENOUS PRN
Status: DISCONTINUED | OUTPATIENT
Start: 2018-09-27 | End: 2018-09-27 | Stop reason: SDUPTHER

## 2018-09-27 RX ORDER — OXYCODONE HYDROCHLORIDE 5 MG/1
10 TABLET ORAL PRN
Status: DISCONTINUED | OUTPATIENT
Start: 2018-09-27 | End: 2018-09-27 | Stop reason: HOSPADM

## 2018-09-27 RX ORDER — PROMETHAZINE HYDROCHLORIDE 25 MG/ML
6.25 INJECTION, SOLUTION INTRAMUSCULAR; INTRAVENOUS
Status: DISCONTINUED | OUTPATIENT
Start: 2018-09-27 | End: 2018-09-27 | Stop reason: HOSPADM

## 2018-09-27 RX ORDER — MIDAZOLAM HYDROCHLORIDE 1 MG/ML
INJECTION INTRAMUSCULAR; INTRAVENOUS PRN
Status: DISCONTINUED | OUTPATIENT
Start: 2018-09-27 | End: 2018-09-27 | Stop reason: SDUPTHER

## 2018-09-27 RX ORDER — PROPOFOL 10 MG/ML
INJECTION, EMULSION INTRAVENOUS PRN
Status: DISCONTINUED | OUTPATIENT
Start: 2018-09-27 | End: 2018-09-27 | Stop reason: SDUPTHER

## 2018-09-27 RX ORDER — CLINDAMYCIN PHOSPHATE 900 MG/50ML
900 INJECTION INTRAVENOUS ONCE
Status: COMPLETED | OUTPATIENT
Start: 2018-09-27 | End: 2018-09-27

## 2018-09-27 RX ORDER — OXYCODONE HYDROCHLORIDE 5 MG/1
5 TABLET ORAL PRN
Status: DISCONTINUED | OUTPATIENT
Start: 2018-09-27 | End: 2018-09-27 | Stop reason: HOSPADM

## 2018-09-27 RX ORDER — METOCLOPRAMIDE HYDROCHLORIDE 5 MG/ML
10 INJECTION INTRAMUSCULAR; INTRAVENOUS
Status: DISCONTINUED | OUTPATIENT
Start: 2018-09-27 | End: 2018-09-27 | Stop reason: HOSPADM

## 2018-09-27 RX ORDER — SODIUM CHLORIDE 9 MG/ML
INJECTION, SOLUTION INTRAVENOUS ONCE
Status: DISCONTINUED | OUTPATIENT
Start: 2018-09-27 | End: 2018-10-03 | Stop reason: HOSPADM

## 2018-09-27 RX ORDER — HYDRALAZINE HYDROCHLORIDE 20 MG/ML
5 INJECTION INTRAMUSCULAR; INTRAVENOUS EVERY 10 MIN PRN
Status: DISCONTINUED | OUTPATIENT
Start: 2018-09-27 | End: 2018-10-03 | Stop reason: HOSPADM

## 2018-09-27 RX ORDER — 0.9 % SODIUM CHLORIDE 0.9 %
10 VIAL (ML) INJECTION ONCE
Status: DISCONTINUED | OUTPATIENT
Start: 2018-09-27 | End: 2018-10-03 | Stop reason: HOSPADM

## 2018-09-27 RX ORDER — MAGNESIUM HYDROXIDE 1200 MG/15ML
LIQUID ORAL CONTINUOUS PRN
Status: DISCONTINUED | OUTPATIENT
Start: 2018-09-27 | End: 2018-10-03 | Stop reason: HOSPADM

## 2018-09-27 RX ORDER — FENTANYL CITRATE 50 UG/ML
INJECTION, SOLUTION INTRAMUSCULAR; INTRAVENOUS PRN
Status: DISCONTINUED | OUTPATIENT
Start: 2018-09-27 | End: 2018-09-27 | Stop reason: SDUPTHER

## 2018-09-27 RX ADMIN — MIDAZOLAM HYDROCHLORIDE 2 MG: 1 INJECTION INTRAMUSCULAR; INTRAVENOUS at 17:23

## 2018-09-27 RX ADMIN — FENTANYL CITRATE 50 MCG: 50 INJECTION INTRAMUSCULAR; INTRAVENOUS at 18:25

## 2018-09-27 RX ADMIN — PROPOFOL 20 MG: 10 INJECTION, EMULSION INTRAVENOUS at 17:29

## 2018-09-27 RX ADMIN — SODIUM CHLORIDE, SODIUM LACTATE, POTASSIUM CHLORIDE, AND CALCIUM CHLORIDE: 600; 310; 30; 20 INJECTION, SOLUTION INTRAVENOUS at 19:02

## 2018-09-27 RX ADMIN — PROPOFOL 20 MG: 10 INJECTION, EMULSION INTRAVENOUS at 17:34

## 2018-09-27 RX ADMIN — CLINDAMYCIN PHOSPHATE 900 MG: 18 INJECTION, SOLUTION INTRAVENOUS at 17:26

## 2018-09-27 RX ADMIN — DEXTROSE MONOHYDRATE 12.5 G: 25 INJECTION, SOLUTION INTRAVENOUS at 18:50

## 2018-09-27 RX ADMIN — SODIUM CHLORIDE, SODIUM LACTATE, POTASSIUM CHLORIDE, AND CALCIUM CHLORIDE: 600; 310; 30; 20 INJECTION, SOLUTION INTRAVENOUS at 14:03

## 2018-09-27 RX ADMIN — DEXAMETHASONE SODIUM PHOSPHATE 4 MG: 4 INJECTION, SOLUTION INTRAMUSCULAR; INTRAVENOUS at 18:16

## 2018-09-27 RX ADMIN — FENTANYL CITRATE 50 MCG: 50 INJECTION INTRAMUSCULAR; INTRAVENOUS at 17:49

## 2018-09-27 RX ADMIN — PROPOFOL 10 MG: 10 INJECTION, EMULSION INTRAVENOUS at 17:25

## 2018-09-27 ASSESSMENT — PULMONARY FUNCTION TESTS
PIF_VALUE: 0
PIF_VALUE: 1
PIF_VALUE: 0
PIF_VALUE: 1
PIF_VALUE: 0
PIF_VALUE: 1
PIF_VALUE: 0

## 2018-09-27 ASSESSMENT — PAIN SCALES - GENERAL
PAINLEVEL_OUTOF10: 0

## 2018-09-27 ASSESSMENT — PAIN DESCRIPTION - DESCRIPTORS: DESCRIPTORS: ACHING;STABBING

## 2018-09-27 ASSESSMENT — PAIN - FUNCTIONAL ASSESSMENT: PAIN_FUNCTIONAL_ASSESSMENT: 0-10
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
## 2018-10-03 ENCOUNTER — HOSPITAL ENCOUNTER (OUTPATIENT)
Dept: WOUND CARE | Age: 53
Discharge: HOME | End: 2018-10-03
Payer: MEDICARE

## 2018-10-03 VITALS
HEART RATE: 106 BPM | BODY MASS INDEX: 32.43 KG/M2 | HEIGHT: 74 IN | DIASTOLIC BLOOD PRESSURE: 69 MMHG | RESPIRATION RATE: 18 BRPM | TEMPERATURE: 97.4 F | WEIGHT: 252.7 LBS | SYSTOLIC BLOOD PRESSURE: 107 MMHG | OXYGEN SATURATION: 99 %

## 2018-10-03 PROCEDURE — 97597 DBRDMT OPN WND 1ST 20 CM/<: CPT

## 2018-10-03 PROCEDURE — 97597 DBRDMT OPN WND 1ST 20 CM/<: CPT | Performed by: INTERNAL MEDICINE

## 2018-10-03 PROCEDURE — 97605 NEG PRS WND THER DME<=50SQCM: CPT | Performed by: INTERNAL MEDICINE

## 2018-10-03 PROCEDURE — 97598 DBRDMT OPN WND ADDL 20CM/<: CPT | Performed by: INTERNAL MEDICINE

## 2018-10-03 PROCEDURE — 97598 DBRDMT OPN WND ADDL 20CM/<: CPT

## 2018-10-03 PROCEDURE — 17250 CHEM CAUT OF GRANLTJ TISSUE: CPT | Performed by: INTERNAL MEDICINE

## 2018-10-03 PROCEDURE — 11042 DBRDMT SUBQ TIS 1ST 20SQCM/<: CPT | Performed by: INTERNAL MEDICINE

## 2018-10-03 PROCEDURE — 11045 DBRDMT SUBQ TISS EACH ADDL: CPT

## 2018-10-03 PROCEDURE — 17250 CHEM CAUT OF GRANLTJ TISSUE: CPT

## 2018-10-03 PROCEDURE — 11045 DBRDMT SUBQ TISS EACH ADDL: CPT | Performed by: INTERNAL MEDICINE

## 2018-10-03 PROCEDURE — 11042 DBRDMT SUBQ TIS 1ST 20SQCM/<: CPT

## 2018-10-03 PROCEDURE — 97606 NEG PRS WND THER DME>50 SQCM: CPT

## 2018-10-03 RX ORDER — LIDOCAINE 40 MG/G
CREAM TOPICAL PRN
Status: DISCONTINUED | OUTPATIENT
Start: 2018-10-03 | End: 2018-10-03 | Stop reason: HOSPADM

## 2018-10-03 ASSESSMENT — PAIN DESCRIPTION - DESCRIPTORS: DESCRIPTORS: ACHING;BURNING;STABBING

## 2018-10-03 ASSESSMENT — PAIN DESCRIPTION - ORIENTATION: ORIENTATION: LEFT

## 2018-10-03 ASSESSMENT — PAIN DESCRIPTION - PAIN TYPE: TYPE: CHRONIC PAIN

## 2018-10-03 ASSESSMENT — PAIN DESCRIPTION - PROGRESSION: CLINICAL_PROGRESSION: NOT CHANGED

## 2018-10-03 ASSESSMENT — PAIN SCALES - GENERAL: PAINLEVEL_OUTOF10: 6

## 2018-10-03 ASSESSMENT — PAIN DESCRIPTION - ONSET: ONSET: ON-GOING

## 2018-10-03 ASSESSMENT — PAIN DESCRIPTION - LOCATION: LOCATION: SHOULDER

## 2018-10-03 ASSESSMENT — PAIN DESCRIPTION - FREQUENCY: FREQUENCY: CONTINUOUS

## 2018-10-04 PROBLEM — I50.9 CONGESTIVE HEART FAILURE (HCC): Status: RESOLVED | Noted: 2018-09-05 | Resolved: 2018-10-04

## 2018-10-04 NOTE — PROGRESS NOTES
T-spine mostly red and granular, fibrin, probably stable hardware exposure, a couple of tunnels filling in; ischium part granular, , some fibrin, biofilm, areas of SQ necrosis, no bone exposure; small right hip sinus tract stable; both heels red and hypergranular; both tibial tubercle and lateral leg ulcers small, dry and necrotic; left lateral foot , more granulation, some epibole, but depth filling in; right lateral foot part granular, part SQ necrotic into tendons and fascial layer. Pre-debridement wound measurements are in the wound documentation flowsheet. Photos also saved in electronic chart. 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 7XXA    Iron deficiency anemia due to chronic blood loss D50.0    Lymphedema of both lower extremities I89.0    Neurogenic bladder N31.9    Neurogenic bowel K59.2    Pressure ulcer of right buttock, stage 4 ([facility]) L89.314    Hypergranulation L92.9    Dehiscence of surgical wound of T-spine T81.31XA    Onychomycosis B35.1    Dystrophic nail L60.3    Peripheral venous insufficiency I87.2    Ischemic cardiomyopathy I25.5    Chronic systolic heart failure ([facility]) I50.22    Pressure ulcer of right foot, stage 4 ([facility]) L89.894    Pressure ulcer of right heel, stage 4 ([facility]) L89.614    Symptomatic anemia D64.9    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 Factors contributing to occurrence and/or persistence of the chronic ulcer include edema, diabetes, chronic pressure, decreased mobility, shear force and obesity. Medical necessity of today's visit is shown by the above documentation. Sharp debridement is indicated today, based upon the exam findings in the wound(s) above. Procedure note:     Consent obtained. Time out performed per [facility] policy. Anesthetic  Anesthetic: 4% Lidocaine Liquid Topical     Using a curette, I sharply debrided the back ulcer(s) down through and including the removal of dermis. The type(s) of tissue debrided included fibrin, necrotic/eschar and exudate. Total Surface Area Debrided: 35 sq cm. Using a curette, I sharply debrided the right ischium ulcer(s) down through and including the removal of subcutaneous tissue. The type(s) of tissue debrided included fibrin, biofilm and necrotic/eschar. Total Surface Area Debrided: 50 sq cm.     Post  Debridement Measurements from today:  Wound 09/12/18 #37 Right lateral distal foot, Surgical Dehiscense, full thickness (onset 8/29/18) Surgical-Wound Length (cm): 8 cm  [REMOVED] Wound 08/29/18 #35- L distal foot, diabetic, Mccauley 1, onset 8/29/18, surgical -Wound Length (cm): 0 cm  Wound 08/29/18 #36- L lateral lower leg, venous, partial thickness, onset 8/29/18, unknown-Wound Length (cm): 6 cm  Wound 08/07/18 #33, left lateral (mid) foot, Pressure ulcer, stage IV, (onset 8/2018) pressure -Wound Length (cm): 1.6 cm  Wound 10/25/17 #29, right heel & lateral foot, DFU, Mccauley 3 onset 10/25/17, pressure-Wound Length (cm): 12.5 cm  Wound 08/16/17 #27- Thoracic spine, dehisced surgical wound, Triad hydro, 4x4 (kerlix, double f tubigrip w/ heel cut out)  Wound 08/29/18 #36- L lateral lower leg, venous, partial thickness, onset 8/29/18, unknown-Dressing/Treatment: Silver dressing, Triad hydro (kerlix, double e- tubigrip)  Wound 08/07/18 #33, left lateral (mid) foot, Pressure ulcer, stage IV, (onset 8/2018) pressure -Dressing/Treatment: ABD, Dry dressing, Silver dressing, Triad hydro (kerlix, football dressing)  Wound 10/25/17 #29, right heel & lateral foot, DFU, Mccauley 3 onset 10/25/17, pressure-Dressing/Treatment: Silver dressing, Triad hydro (adaptic, 4x4, ABD, kerlix, double f tubi wheel cut out)  Wound 08/16/17 #27- Thoracic spine, dehisced surgical wound, full thickness, onset 8/16/2017, pressure-Dressing/Treatment: Vacuum dressing (sorbact-hydrocolloid to alin wound)  Wound 10/17/14  #8, right ischium, stage 4 PU, onset 7/15/14 (merged with #30), pressure-Dressing/Treatment: Silicone dressing, Silver dressing, Triad hydro (gauze bolster)  Wound 09/26/18 # 38, Right Lower Lateral Leg, Venous, Partial Thickness, onset 9/23/18, gradually appeared-Dressing/Treatment: Dry dressing, Triad hydro (double f tubigrip)  [REMOVED] Wound 09/26/18 # 39, Right Anterior Ankle, Venous, Partial Thickness, onset 9/23/18, gradually appeared-Dressing/Treatment: Dry dressing, Triad hydro (double f tubi)  Wound 09/26/18 # 40, Right Anterior Knee, Venous,Parital Thickness, onset 9/23/18, gradually appeared-Dressing/Treatment: Dry dressing, Triad hydro (double f tubigrip). Surgery for right foot and ankle tomorrow. Keep left football dressing and compression in place for the week. Continue TIW Bactrim suppression. Keep focused on offloading and protein intake, and glucose control. Waiting on [facility] to discuss a chair modification to keep pressure off the right lateral foot; continue to minimize time in chair, though. Home treatment: good handwashing before and after any dressing changes.  Cleanse wound with

## 2018-10-09 NOTE — PROGRESS NOTES
Vacuum dressing (Dry dressing over incsion, NPWT drape)  Wound 08/29/18 #36- L lateral lower leg, venous, partial thickness, onset 8/29/18, unknown-Dressing/Treatment: Silver dressing, Triad hydro (kerlix, double e- tubigrip)  Wound 08/07/18 #33, left lateral (mid) foot, Pressure ulcer, stage IV, (onset 8/2018) pressure -Dressing/Treatment: ABD, Dry dressing, Silver dressing, Triad hydro (kerlix, football dressing)  Wound 10/25/17 #29, right heel & lateral foot, DFU, Mccauley 3 onset 10/25/17, pressure-Dressing/Treatment: Vacuum dressing, Other (Comment) (adaptic,double f tubi, cast padding, splint, ace wrap)  Wound 08/16/17 #27- Thoracic spine, dehisced surgical wound, full thickness, onset 8/16/2017, pressure-Dressing/Treatment: Vacuum dressing (sorbact)  Incision 09/27/18 Foot Right-Dressing/Treatment: Dry dressing (NPWT drape). Continue suppressive Bactrim for whatever benefit it might be giving to the back wound, as long as tolerated. Keep up good work with protein intake and glucose control. I added a bit of foam padding to his RLE splint today, to relieve that one small area of pressure. Left Prevalon boot at all times. Maximum time in bed for right now, I feel, especially for the benefit that it gives his ischium and right leg. Might be ready for a left CROW (or similar) soon. FU with Dr. Wilmer Dowell on Friday as scheduled. Home treatment: good handwashing before and after any dressing changes. Cleanse wound with saline or soap & water before dressing change. May use Vaseline (petrolatum), Aquaphor, Aveeno, CeraVe, Cetaphil, Eucerin, Lubriderm, etc for dry skin. Dressing type for home: Other: generally as above, 3 times weekly, except leave LLE dressing and wrap in place for the week, and also change ischial dressing more frequently if needed, for heavy drainage. Written discharge instructions given to patient. Follow up in 1 week.     Electronically signed by Fabrizio Mckeon MD on 10/9/2018 at

## 2018-10-10 ENCOUNTER — HOSPITAL ENCOUNTER (OUTPATIENT)
Dept: WOUND CARE | Age: 53
Discharge: HOME | End: 2018-10-10
Payer: MEDICARE

## 2018-10-10 VITALS
SYSTOLIC BLOOD PRESSURE: 110 MMHG | HEIGHT: 74 IN | TEMPERATURE: 97.7 F | RESPIRATION RATE: 18 BRPM | DIASTOLIC BLOOD PRESSURE: 60 MMHG | BODY MASS INDEX: 32.8 KG/M2 | HEART RATE: 79 BPM | WEIGHT: 255.6 LBS

## 2018-10-10 DIAGNOSIS — L97.922 CHRONIC ULCER OF LEFT LEG WITH FAT LAYER EXPOSED (HCC): ICD-10-CM

## 2018-10-10 DIAGNOSIS — L89.92: ICD-10-CM

## 2018-10-10 PROCEDURE — 11045 DBRDMT SUBQ TISS EACH ADDL: CPT

## 2018-10-10 PROCEDURE — 11042 DBRDMT SUBQ TIS 1ST 20SQCM/<: CPT | Performed by: INTERNAL MEDICINE

## 2018-10-10 PROCEDURE — 97598 DBRDMT OPN WND ADDL 20CM/<: CPT | Performed by: INTERNAL MEDICINE

## 2018-10-10 PROCEDURE — 97606 NEG PRS WND THER DME>50 SQCM: CPT

## 2018-10-10 PROCEDURE — 17250 CHEM CAUT OF GRANLTJ TISSUE: CPT | Performed by: INTERNAL MEDICINE

## 2018-10-10 PROCEDURE — 11042 DBRDMT SUBQ TIS 1ST 20SQCM/<: CPT

## 2018-10-10 PROCEDURE — 11045 DBRDMT SUBQ TISS EACH ADDL: CPT | Performed by: INTERNAL MEDICINE

## 2018-10-10 PROCEDURE — 97597 DBRDMT OPN WND 1ST 20 CM/<: CPT | Performed by: INTERNAL MEDICINE

## 2018-10-10 PROCEDURE — 97597 DBRDMT OPN WND 1ST 20 CM/<: CPT

## 2018-10-10 PROCEDURE — 97598 DBRDMT OPN WND ADDL 20CM/<: CPT

## 2018-10-10 PROCEDURE — 17250 CHEM CAUT OF GRANLTJ TISSUE: CPT

## 2018-10-10 RX ORDER — LIDOCAINE 40 MG/G
CREAM TOPICAL PRN
Status: DISCONTINUED | OUTPATIENT
Start: 2018-10-10 | End: 2018-10-11 | Stop reason: HOSPADM

## 2018-10-10 ASSESSMENT — PAIN DESCRIPTION - ONSET: ONSET: ON-GOING

## 2018-10-10 ASSESSMENT — PAIN DESCRIPTION - FREQUENCY: FREQUENCY: CONTINUOUS

## 2018-10-10 ASSESSMENT — PAIN DESCRIPTION - DESCRIPTORS: DESCRIPTORS: ACHING;BURNING;CONSTANT

## 2018-10-10 ASSESSMENT — PAIN DESCRIPTION - PAIN TYPE: TYPE: CHRONIC PAIN

## 2018-10-10 ASSESSMENT — PAIN DESCRIPTION - PROGRESSION: CLINICAL_PROGRESSION: NOT CHANGED

## 2018-10-10 ASSESSMENT — PAIN DESCRIPTION - ORIENTATION: ORIENTATION: LEFT

## 2018-10-10 ASSESSMENT — PAIN DESCRIPTION - LOCATION: LOCATION: SHOULDER

## 2018-10-10 ASSESSMENT — PAIN SCALES - GENERAL: PAINLEVEL_OUTOF10: 6

## 2018-10-17 ENCOUNTER — HOSPITAL ENCOUNTER (OUTPATIENT)
Dept: WOUND CARE | Age: 53
Discharge: HOME | End: 2018-10-17
Payer: MEDICARE

## 2018-10-17 VITALS
BODY MASS INDEX: 32.82 KG/M2 | SYSTOLIC BLOOD PRESSURE: 91 MMHG | DIASTOLIC BLOOD PRESSURE: 56 MMHG | TEMPERATURE: 97.6 F | HEART RATE: 81 BPM | RESPIRATION RATE: 18 BRPM | HEIGHT: 74 IN

## 2018-10-17 DIAGNOSIS — L97.509 TYPE 2 DIABETES MELLITUS WITH FOOT ULCER, WITH LONG-TERM CURRENT USE OF INSULIN (HCC): ICD-10-CM

## 2018-10-17 DIAGNOSIS — Z79.4 TYPE 2 DIABETES MELLITUS WITH FOOT ULCER, WITH LONG-TERM CURRENT USE OF INSULIN (HCC): ICD-10-CM

## 2018-10-17 DIAGNOSIS — E11.621 TYPE 2 DIABETES MELLITUS WITH FOOT ULCER, WITH LONG-TERM CURRENT USE OF INSULIN (HCC): ICD-10-CM

## 2018-10-17 PROBLEM — L97.922 CHRONIC ULCER OF LEFT LEG WITH FAT LAYER EXPOSED (HCC): Status: ACTIVE | Noted: 2018-10-17

## 2018-10-17 PROBLEM — L89.96: Status: ACTIVE | Noted: 2018-10-17

## 2018-10-17 PROCEDURE — 17250 CHEM CAUT OF GRANLTJ TISSUE: CPT

## 2018-10-17 PROCEDURE — 97597 DBRDMT OPN WND 1ST 20 CM/<: CPT | Performed by: INTERNAL MEDICINE

## 2018-10-17 PROCEDURE — 11043 DBRDMT MUSC&/FSCA 1ST 20/<: CPT | Performed by: INTERNAL MEDICINE

## 2018-10-17 PROCEDURE — 11045 DBRDMT SUBQ TISS EACH ADDL: CPT | Performed by: INTERNAL MEDICINE

## 2018-10-17 PROCEDURE — 11042 DBRDMT SUBQ TIS 1ST 20SQCM/<: CPT | Performed by: INTERNAL MEDICINE

## 2018-10-17 PROCEDURE — 11043 DBRDMT MUSC&/FSCA 1ST 20/<: CPT

## 2018-10-17 PROCEDURE — 97598 DBRDMT OPN WND ADDL 20CM/<: CPT

## 2018-10-17 PROCEDURE — 97597 DBRDMT OPN WND 1ST 20 CM/<: CPT

## 2018-10-17 PROCEDURE — 97598 DBRDMT OPN WND ADDL 20CM/<: CPT | Performed by: INTERNAL MEDICINE

## 2018-10-17 PROCEDURE — 11042 DBRDMT SUBQ TIS 1ST 20SQCM/<: CPT

## 2018-10-17 PROCEDURE — 11045 DBRDMT SUBQ TISS EACH ADDL: CPT

## 2018-10-17 PROCEDURE — 17250 CHEM CAUT OF GRANLTJ TISSUE: CPT | Performed by: INTERNAL MEDICINE

## 2018-10-17 PROCEDURE — 97605 NEG PRS WND THER DME<=50SQCM: CPT

## 2018-10-17 RX ORDER — LIDOCAINE 40 MG/G
CREAM TOPICAL PRN
Status: DISCONTINUED | OUTPATIENT
Start: 2018-10-17 | End: 2018-10-18 | Stop reason: HOSPADM

## 2018-10-17 ASSESSMENT — PAIN DESCRIPTION - ONSET: ONSET: ON-GOING

## 2018-10-17 ASSESSMENT — PAIN DESCRIPTION - DESCRIPTORS: DESCRIPTORS: ACHING;BURNING;CONSTANT

## 2018-10-17 ASSESSMENT — PAIN DESCRIPTION - PROGRESSION: CLINICAL_PROGRESSION: NOT CHANGED

## 2018-10-17 ASSESSMENT — PAIN SCALES - GENERAL: PAINLEVEL_OUTOF10: 6

## 2018-10-17 ASSESSMENT — PAIN DESCRIPTION - FREQUENCY: FREQUENCY: CONTINUOUS

## 2018-10-17 ASSESSMENT — PAIN DESCRIPTION - ORIENTATION: ORIENTATION: LEFT

## 2018-10-17 ASSESSMENT — PAIN DESCRIPTION - PAIN TYPE: TYPE: CHRONIC PAIN

## 2018-10-17 ASSESSMENT — PAIN DESCRIPTION - LOCATION: LOCATION: SHOULDER

## 2018-10-17 NOTE — PROGRESS NOTES
[facility] Progress Note    Vivi Jauregui     : 1965    DATE OF VISIT:  10/10/2018    Subjective:     Vivi Jauregui is a 50 y.o. male who has a non-healing surgical ulcer located on the back. Significant symptoms or pertinent wound history since last visit: overall feeling ok this week. No F/C/D, no N/V/D, no SOB or CP, no pruritus or rash from Bactrim. Less bleeding from right foot with VAC changes this week. Doing a better job of spending limited time in his chair. Eating well, glucoses good. Additional ulcer(s) noted? yes. Right ischial and hip, B/L heels, right lateral foot, left lateral foot and lower leg, and a few scattered spots on pretibial skin. His current medication list consists of Apixaban, Insulin Lispro, aspirin, clopidogrel, cyclobenzaprine, docusate sodium, ferrous sulfate, insulin glargine, iron sucrose, magnesium oxide, metFORMIN, metoclopramide, metoprolol succinate, nitroGLYCERIN, nystatin, oxyCODONE, pantoprazole, promethazine, rosuvastatin, senna, sulfamethoxazole-trimethoprim, torsemide, and traZODone. Allergies: Benadryl [diphenhydramine hcl]; Bactrim [sulfamethoxazole-trimethoprim]; Statins [statins]; Cephalexin; Morphine; Penicillins; and Sulfa antibiotics    Objective:     Vitals:    10/10/18 1115   BP: 110/60   Pulse: 79   Resp: 18   Temp: 97.7 °F (36.5 °C)   TempSrc: Oral   Weight: 255 lb 9.6 oz (115.9 kg)   Height: 6' 2\" (1.88 m)     AAOx3, overweight, fatigued, NAD  Intact distal pulses, feet warm, good cap refill  Mild-mod LE edema, as usual most prominent at right thigh  No cellulitis or angitis, no thrush, no drug rash, no cutaneous Candidiasis  Chetna-ulcer skin: generally pink and indurated; smaller cluster of tiny skin tears at back, from [facility] drape; mild ecchymosis and maceration at right lateral foot.   Ulcer(s): T-spine stable in size, mostly red and granular, some fibrin and debris, small tunnels persist, with exposed

## 2018-10-24 ENCOUNTER — HOSPITAL ENCOUNTER (OUTPATIENT)
Dept: WOUND CARE | Age: 53
Discharge: HOME | End: 2018-10-24
Payer: MEDICARE

## 2018-10-24 VITALS
HEIGHT: 74 IN | RESPIRATION RATE: 18 BRPM | BODY MASS INDEX: 33.7 KG/M2 | HEART RATE: 76 BPM | SYSTOLIC BLOOD PRESSURE: 110 MMHG | TEMPERATURE: 96.7 F | DIASTOLIC BLOOD PRESSURE: 65 MMHG | WEIGHT: 262.6 LBS

## 2018-10-24 PROCEDURE — 97606 NEG PRS WND THER DME>50 SQCM: CPT

## 2018-10-24 PROCEDURE — 11043 DBRDMT MUSC&/FSCA 1ST 20/<: CPT | Performed by: INTERNAL MEDICINE

## 2018-10-24 PROCEDURE — 11042 DBRDMT SUBQ TIS 1ST 20SQCM/<: CPT | Performed by: INTERNAL MEDICINE

## 2018-10-24 PROCEDURE — 11042 DBRDMT SUBQ TIS 1ST 20SQCM/<: CPT

## 2018-10-24 PROCEDURE — 97597 DBRDMT OPN WND 1ST 20 CM/<: CPT | Performed by: INTERNAL MEDICINE

## 2018-10-24 PROCEDURE — 11045 DBRDMT SUBQ TISS EACH ADDL: CPT | Performed by: INTERNAL MEDICINE

## 2018-10-24 PROCEDURE — 11043 DBRDMT MUSC&/FSCA 1ST 20/<: CPT

## 2018-10-24 PROCEDURE — 11045 DBRDMT SUBQ TISS EACH ADDL: CPT

## 2018-10-24 PROCEDURE — 97598 DBRDMT OPN WND ADDL 20CM/<: CPT | Performed by: INTERNAL MEDICINE

## 2018-10-24 PROCEDURE — 97598 DBRDMT OPN WND ADDL 20CM/<: CPT

## 2018-10-24 PROCEDURE — 97597 DBRDMT OPN WND 1ST 20 CM/<: CPT

## 2018-10-24 ASSESSMENT — PAIN DESCRIPTION - PROGRESSION: CLINICAL_PROGRESSION: NOT CHANGED

## 2018-10-24 ASSESSMENT — PAIN DESCRIPTION - LOCATION: LOCATION: SHOULDER

## 2018-10-24 ASSESSMENT — PAIN DESCRIPTION - FREQUENCY: FREQUENCY: CONTINUOUS

## 2018-10-24 ASSESSMENT — PAIN DESCRIPTION - ONSET: ONSET: ON-GOING

## 2018-10-24 ASSESSMENT — PAIN DESCRIPTION - ORIENTATION: ORIENTATION: LEFT

## 2018-10-24 ASSESSMENT — PAIN SCALES - GENERAL: PAINLEVEL_OUTOF10: 7

## 2018-10-24 ASSESSMENT — PAIN DESCRIPTION - DESCRIPTORS: DESCRIPTORS: ACHING;BURNING;CONSTANT

## 2018-10-24 ASSESSMENT — PAIN DESCRIPTION - PAIN TYPE: TYPE: CHRONIC PAIN

## 2018-10-24 NOTE — PLAN OF CARE
Problem: Wound:  Goal: Will show signs of wound healing; wound closure and no evidence of infection  Will show signs of wound healing; wound closure and no evidence of infection   Outcome: Ongoing  Patient states he is happy so far with his new [employer] agency. Left heel noted to have some signs of pressure this week, new Heelmedix boot given for left foot/leg as well. Pt. States that the Heelmedix off-loading boot he has used for the last week on the right has helped. Left foot & leg stable & otherwise improving. Pt. States he is scheduled to see Dr. Yesenia Lu again next Wednesday to decide further plans re: right foot. Debridement of right foot as documented per MD, will cont. With NPWT as ordered. Thoracic wound stable, debridement per MD & also cont. With NPWT as ordered. Ischial wound with bone noted this week, debridement per MD, will cont. With current wound care regime. Reinforced importance of focusing on not sitting & repositioning from side to side in bed q2 hours. F/u in [facility] in 1 week as ordered. Discharge instructions reviewed with patient, all questions answered, copy given to patient. Dressings were applied to all wounds per M.D. Instructions at this visit.

## 2018-10-25 DIAGNOSIS — E11.622 TYPE 2 DIABETES MELLITUS WITH OTHER SKIN ULCER, WITH LONG-TERM CURRENT USE OF INSULIN (HCC): ICD-10-CM

## 2018-10-25 DIAGNOSIS — Z79.4 TYPE 2 DIABETES MELLITUS WITH OTHER SKIN ULCER, WITH LONG-TERM CURRENT USE OF INSULIN (HCC): ICD-10-CM

## 2018-10-25 PROBLEM — L97.922 CHRONIC ULCER OF LEFT LEG WITH FAT LAYER EXPOSED (HCC): Status: RESOLVED | Noted: 2018-10-17 | Resolved: 2018-10-25

## 2018-10-25 PROBLEM — D64.9 SYMPTOMATIC ANEMIA: Chronic | Status: RESOLVED | Noted: 2018-01-07 | Resolved: 2018-10-25

## 2018-10-25 PROBLEM — L89.614 PRESSURE ULCER OF RIGHT HEEL, STAGE 4 (HCC): Status: RESOLVED | Noted: 2017-12-05 | Resolved: 2018-10-25

## 2018-10-25 PROBLEM — L89.890 UNSTAGEABLE PRESSURE ULCER OF RIGHT FOOT (HCC): Status: ACTIVE | Noted: 2018-10-17

## 2018-10-25 PROBLEM — I87.2 PERIPHERAL VENOUS INSUFFICIENCY: Status: RESOLVED | Noted: 2017-09-11 | Resolved: 2018-10-25

## 2018-10-26 RX ORDER — INSULIN GLARGINE 100 [IU]/ML
INJECTION, SOLUTION SUBCUTANEOUS
Qty: 30 ML | Refills: 5 | Status: SHIPPED | OUTPATIENT
Start: 2018-10-26 | End: 2019-11-03 | Stop reason: SDUPTHER

## 2018-10-26 NOTE — PROGRESS NOTES
Penicillins; and Sulfa antibiotics    Objective:     Vitals:    10/17/18 1042   BP: (!) 91/56   Pulse: 81   Resp: 18   Temp: 97.6 °F (36.4 °C)   TempSrc: Oral   Height: 6' 2\" (1.88 m)     AAOx3, fatigued, overweight, NAD  Dopplerable distal pulses, feet warm, good cap refill  No cellulitis, angitis, fluctuance, no clear Candidiasis  Generally mild-mod LE edema, still more mod-severe at right thigh, as usual  No acute arthritis, stable but significant deformities of feet due to loss of peroneal tendon attachments  Chetna-ulcer skin: generally pink and indurated; mild erythema at right ischium, mild maceration at right lateral foot, mild hyperkeratosis at left heel and right plantar foot. Ulcer(s): T-spine mostly pink-red and granular, still some inflamed tissue at exposed hardware, overlying fibrin +/- biofilm; right ischium a mix of granulation, SQ fibronecrosis, overlying fibrin and biofilm, less active changes of excess pressure, but bone is very thinly covered in a couple of spots; right hip sinus tract stable; both lower legs with a few small eschars and superficial, mostly dry necrotic ulcers; both heels red and hypergranular, clean; left lateral foot red, granular, nearly healed; new ulcer right anterior ankle due to placement of VAC Port by home-care; right foot incision with sutures intact; distal right foot ulcer with some granulation, possibly some fibronecrosis, but still visible intact graft; larger right lateral foot ulcer with silicone layer torn off, a fair amount of granulation, some definite small foci of Integra that are still integrating, but then separately some clearly necrotic SQ fat and fascial / tendon tissue, no pus, no bone exposure; right plantar foot (2nd-3rd met heads) with a dense black eschar, stable and dry. Pre-debridement wound measurements are in the wound documentation flowsheet. Photos also saved in electronic chart.     Assessment:     Patient Active Problem List   Diagnosis Code Partial Thickness, onset 9/23/18, gradually appeared-Wound Length (cm): 2.4 cm  [REMOVED] Wound 09/26/18 # 40, Right Anterior Knee, Venous,Parital Thickness, onset 9/23/18, gradually appeared-Wound Length (cm): 1.2 cm  Wound 08/29/18 #36- L lateral lower leg, venous, partial thickness, onset 8/29/18, unknown-Wound Length (cm): 0.5 cm  Wound 08/07/18 #33, left lateral (mid) foot, Pressure ulcer, stage IV, (onset 8/2018) pressure -Wound Length (cm): 1 cm    Wound 10/17/14  #8, right ischium, stage 4 PU, onset 7/15/14 (merged with #30), pressure-Wound Width (cm): 7 cm  Wound 09/12/18 #37 Right lateral distal foot, Surgical Dehiscense, full thickness (onset 8/29/18) Surgical-Wound Width (cm): 1.5 cm  Wound 10/25/17 #29, right heel & lateral foot, DFU, Mccauley 3 onset 10/25/17, pressure-Wound Width (cm): 6 cm  Wound 08/16/17 #27- Thoracic spine, dehisced surgical wound, full thickness, onset 8/16/2017, pressure-Wound Width (cm): 4.3 cm  Wound 10/10/18 #41 Right plantar foot, Unstageable, onset 10/1/18, pressure-Wound Width (cm): 3.3 cm  Wound 09/26/18 # 38, Right Lower Lateral Leg, Venous, Partial Thickness, onset 9/23/18, gradually appeared-Wound Width (cm): 1 cm  [REMOVED] Wound 09/26/18 # 40, Right Anterior Knee, Venous,Parital Thickness, onset 9/23/18, gradually appeared-Wound Width (cm): 1 cm  Wound 08/29/18 #36- L lateral lower leg, venous, partial thickness, onset 8/29/18, unknown-Wound Width (cm): 0.5 cm  Wound 08/07/18 #33, left lateral (mid) foot, Pressure ulcer, stage IV, (onset 8/2018) pressure -Wound Width (cm): 0.5 cm    Wound 10/17/14  #8, right ischium, stage 4 PU, onset 7/15/14 (merged with #30), pressure-Wound Depth (cm) : 1.8  Wound 09/12/18 #37 Right lateral distal foot, Surgical Dehiscense, full thickness (onset 8/29/18) Surgical-Wound Depth (cm) : 0.8  Wound 10/25/17 #29, right heel & lateral foot, DFU, Mccauley 3 onset 10/25/17, pressure-Wound Depth (cm) : 1  Wound 08/16/17 #27- Thoracic spine, dehisced surgical wound, full thickness, onset 8/16/2017, pressure-Wound Depth (cm) : 0.8  Wound 10/10/18 #41 Right plantar foot, Unstageable, onset 10/1/18, pressure-Wound Depth (cm) : 0.1  Wound 09/26/18 # 38, Right Lower Lateral Leg, Venous, Partial Thickness, onset 9/23/18, gradually appeared-Wound Depth (cm) : 2.2  [REMOVED] Wound 09/26/18 # 40, Right Anterior Knee, Venous,Parital Thickness, onset 9/23/18, gradually appeared-Wound Depth (cm) : 0.1  Wound 08/29/18 #36- L lateral lower leg, venous, partial thickness, onset 8/29/18, unknown-Wound Depth (cm) : 0.1  Wound 08/07/18 #33, left lateral (mid) foot, Pressure ulcer, stage IV, (onset 8/2018) pressure -Wound Depth (cm) : 0.4    The ulcers were then irrigated with normal saline solution. The procedure was completed with a moderate amount of bleeding, and hemostasis was by pressure. The patient tolerated the procedure well, with no significant complications. The patient's level of pain during and after the procedure was monitored, and is noted in the wound documentation flowsheet.  _______________    To encourage better epithelial cell coverage, I did use AgNO3 to chemically cauterize hypergranulation tissue on the B/L heel ulcer(s), after application of 4% lidocaine topical solution. This was tolerated well, with no pain or skin injury. Discharge plan:     Treatment in the wound care center today: Wound 10/17/14  #8, right ischium, stage 4 PU, onset 7/15/14 (merged with #30), pressure-Dressing/Treatment:  (Nexodyn,Triad,Opticell Ag,bolster,Mepilex border); Right hip with iodoform and a small Mepilex border.    Wound 09/12/18 #37 Right lateral distal foot, Surgical Dehiscense, full thickness (onset 8/29/18) Surgical-Dressing/Treatment:  (betadine dry dressing football dressing)  Wound 10/25/17 #29, right heel & lateral foot, DFU, Mccauley 3 onset 10/25/17, pressure-Dressing/Treatment:  (flagyl triad silver alginate ABD football dressing)  Wound 08/16/17 #27-

## 2018-10-31 ENCOUNTER — HOSPITAL ENCOUNTER (OUTPATIENT)
Dept: WOUND CARE | Age: 53
Discharge: HOME | End: 2018-10-31
Payer: MEDICARE

## 2018-10-31 VITALS
DIASTOLIC BLOOD PRESSURE: 58 MMHG | SYSTOLIC BLOOD PRESSURE: 92 MMHG | HEART RATE: 80 BPM | TEMPERATURE: 97.3 F | HEIGHT: 74 IN | RESPIRATION RATE: 18 BRPM | BODY MASS INDEX: 33.72 KG/M2

## 2018-10-31 PROCEDURE — 97597 DBRDMT OPN WND 1ST 20 CM/<: CPT | Performed by: INTERNAL MEDICINE

## 2018-10-31 PROCEDURE — 17250 CHEM CAUT OF GRANLTJ TISSUE: CPT | Performed by: INTERNAL MEDICINE

## 2018-10-31 PROCEDURE — 97598 DBRDMT OPN WND ADDL 20CM/<: CPT | Performed by: INTERNAL MEDICINE

## 2018-10-31 PROCEDURE — 11045 DBRDMT SUBQ TISS EACH ADDL: CPT

## 2018-10-31 PROCEDURE — 11042 DBRDMT SUBQ TIS 1ST 20SQCM/<: CPT | Performed by: INTERNAL MEDICINE

## 2018-10-31 PROCEDURE — 97597 DBRDMT OPN WND 1ST 20 CM/<: CPT

## 2018-10-31 PROCEDURE — 97605 NEG PRS WND THER DME<=50SQCM: CPT

## 2018-10-31 PROCEDURE — 11042 DBRDMT SUBQ TIS 1ST 20SQCM/<: CPT

## 2018-10-31 PROCEDURE — 97598 DBRDMT OPN WND ADDL 20CM/<: CPT

## 2018-10-31 PROCEDURE — 11043 DBRDMT MUSC&/FSCA 1ST 20/<: CPT | Performed by: INTERNAL MEDICINE

## 2018-10-31 PROCEDURE — 11043 DBRDMT MUSC&/FSCA 1ST 20/<: CPT

## 2018-10-31 PROCEDURE — 11045 DBRDMT SUBQ TISS EACH ADDL: CPT | Performed by: INTERNAL MEDICINE

## 2018-10-31 PROCEDURE — 17250 CHEM CAUT OF GRANLTJ TISSUE: CPT

## 2018-10-31 RX ORDER — LIDOCAINE 40 MG/G
CREAM TOPICAL PRN
Status: DISCONTINUED | OUTPATIENT
Start: 2018-10-31 | End: 2018-11-01 | Stop reason: HOSPADM

## 2018-10-31 ASSESSMENT — PAIN DESCRIPTION - ONSET: ONSET: ON-GOING

## 2018-10-31 ASSESSMENT — PAIN DESCRIPTION - PAIN TYPE: TYPE: CHRONIC PAIN

## 2018-10-31 ASSESSMENT — PAIN DESCRIPTION - LOCATION: LOCATION: SHOULDER

## 2018-10-31 ASSESSMENT — PAIN DESCRIPTION - DESCRIPTORS: DESCRIPTORS: ACHING;BURNING;CONSTANT

## 2018-10-31 ASSESSMENT — PAIN DESCRIPTION - FREQUENCY: FREQUENCY: CONTINUOUS

## 2018-10-31 ASSESSMENT — PAIN DESCRIPTION - ORIENTATION: ORIENTATION: LEFT

## 2018-10-31 ASSESSMENT — PAIN DESCRIPTION - PROGRESSION: CLINICAL_PROGRESSION: NOT CHANGED

## 2018-10-31 ASSESSMENT — PAIN SCALES - GENERAL: PAINLEVEL_OUTOF10: 7

## 2018-10-31 NOTE — PROGRESS NOTES
(onset 8/2018) pressure -Wound Length (cm): 1.4 cm  Wound 08/29/18 #36- L lateral lower leg, venous, partial thickness, onset 8/29/18, unknown-Wound Length (cm): 4.2 cm  Wound 10/10/18 #41 Right plantar foot, Unstageable, onset 10/1/18, pressure-Wound Length (cm): 2.9 cm  Wound 09/26/18 # 38, Right Lower Lateral Leg, Venous, Partial Thickness, onset 9/23/18, gradually appeared-Wound Length (cm): 1.7 cm  Wound 08/16/17 #27- Thoracic spine, dehisced surgical wound, full thickness, onset 8/16/2017, pressure-Wound Length (cm): 7.3 cm  Wound 10/17/14  #8, right ischium, stage 4 PU, onset 7/15/14 (merged with #30), pressure-Wound Length (cm): 13.5 cm  [REMOVED] Wound 09/26/18 # 40, Right Anterior Knee, Venous,Parital Thickness, onset 9/23/18, gradually appeared-Wound Length (cm): 0 cm  Wound 09/12/18 #37 Right lateral distal foot, Surgical Dehiscense, full thickness (onset 8/29/18) Surgical-Wound Length (cm): 1.5 cm  Wound 10/25/17 #29, right heel & lateral foot, DFU, Mccauley 3 onset 10/25/17, pressure-Wound Length (cm): 10 cm    Wound 08/07/18 #33, left lateral (mid) foot, Pressure ulcer, stage IV, (onset 8/2018) pressure -Wound Width (cm): 0.3 cm  Wound 08/29/18 #36- L lateral lower leg, venous, partial thickness, onset 8/29/18, unknown-Wound Width (cm): 0.1 cm  Wound 10/10/18 #41 Right plantar foot, Unstageable, onset 10/1/18, pressure-Wound Width (cm): 3.5 cm  Wound 09/26/18 # 38, Right Lower Lateral Leg, Venous, Partial Thickness, onset 9/23/18, gradually appeared-Wound Width (cm): 1.5 cm  Wound 08/16/17 #27- Thoracic spine, dehisced surgical wound, full thickness, onset 8/16/2017, pressure-Wound Width (cm): 4.5 cm  Wound 10/17/14  #8, right ischium, stage 4 PU, onset 7/15/14 (merged with #30), pressure-Wound Width (cm): 5.5 cm  [REMOVED] Wound 09/26/18 # 40, Right Anterior Knee, Venous,Parital Thickness, onset 9/23/18, gradually appeared-Wound Width (cm): 0 cm  Wound 09/12/18 #37 Right lateral distal foot, Surgical

## 2018-11-06 NOTE — PROGRESS NOTES
[facility] Progress Note    Sue Benson     : 1965    DATE OF VISIT:  10/31/2018    Subjective:     Sue Benson is a 50 y.o. male who has a non-healing surgical ulcer located on the back. Significant symptoms or pertinent wound history since last visit: doing ok this week; seems to be getting better offloading with BL [employer] boots, though one is a bit small. No F/C/D, no N/V, no SOB or CP. Will be seeing Dr. Eleazar Grover later today. No pruritus with Bactrim. Additional ulcer(s) noted? yes. Right ischium, right hip, left heel and lateral foot, right heel / lateral foot / plantar foot, and then a few small scattered wounds on the lower legs. His current medication list consists of Apixaban, Insulin Lispro, aspirin, clopidogrel, cyclobenzaprine, docusate sodium, ferrous sulfate, insulin glargine, iron sucrose, magnesium oxide, metFORMIN, metoclopramide, metoprolol succinate, nitroGLYCERIN, nystatin, oxyCODONE, pantoprazole, promethazine, rosuvastatin, senna, sulfamethoxazole-trimethoprim, torsemide, and traZODone. Allergies: Benadryl [diphenhydramine hcl]; Statins [statins]; Cephalexin; Morphine; Penicillins; and Sulfa antibiotics    Objective:     Vitals:    10/31/18 1053   BP: (!) 92/58   Pulse: 80   Resp: 18   Temp: 97.3 °F (36.3 °C)   TempSrc: Oral   Height: 6' 2\" (1.88 m)     AAOx3, overweight, pale, NAD  Intact distal pulses, feet warm, good cap refill  No cellulitis, angitis, fluctuance  No icterus, thrush, drug rash, abd tenderness or definite cutaneous Candidiasis; improved tiny skin tears next to back wound  Chetna-ulcer skin: generally pink and indurated, mild erythema at ischium, some peeling hyperkeratosis at plantar right foot, a bit of maceration again at the right foot, as VAC foam is being placed without drape underneath; right foot suture line intact, dry; ecchymotic changes at left heel resolved.   Ulcer(s): T-spine red, granular, fibrin, T81. 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 A number of them are making some slow, steady progress, but the T-spine is obviously an ongoing challenge with the hardware involvement, and the right distal foot isn't as healthy as I had thought; no large-vessel ischemia, no acute soft tissue infection, and I think offloading has been better in recent weeks (at the pelvis and the feet). Factors contributing to occurrence and/or persistence of the chronic ulcer include edema, diabetes, chronic pressure, decreased mobility, shear force and obesity. Medical necessity of today's visit is shown by the above documentation. Sharp debridement is indicated today, based upon the exam findings in the wound(s) above. Procedure note:     Consent obtained. Time out performed per [facility] policy. Anesthetic  Anesthetic: 4% Lidocaine Liquid Topical     Using a curette, I sharply debrided the back ulcer(s) down through and including the removal of dermis. The type(s) of tissue debrided included fibrin and exudate. Total Surface Area Debrided: 30 sq cm. Using a curette, I sharply debrided the buttocks ulcer(s) down through and including the removal of subcutaneous tissue. The type(s) of tissue debrided included fibrin, biofilm and necrotic/eschar. Total Surface Area Debrided: approx 50 sq cm.     Using a curette, forceps and # 10 blade scalpel, I sharply debrided the two right lateral foot ulcer(s) graft, or Port?), or even consider having Dr. Dominick Chavez do it in the OR. Home treatment: good handwashing before and after any dressing changes. Cleanse wound with saline or soap & water before dressing change. May use Vaseline (petrolatum), Aquaphor, Aveeno, CeraVe, Cetaphil, Eucerin, Lubriderm, etc for dry skin. Dressing type for home: Other: generally as above TIW, except change ischial dressing more often as needed for heavy drainage, leave left foot and leg dressings and wraps in place, and ongoing care of the right foot will be partly dictated by Dr. Azael Peralta (since he's seeing him today, we held the [facility] there for just these several hours), 3 times weekly. Written discharge instructions given to patient. Follow up in 1 week.     Electronically signed by Nadia Christensen MD on 11/6/2018 at 7:42 AM.

## 2018-11-07 ENCOUNTER — HOSPITAL ENCOUNTER (OUTPATIENT)
Dept: WOUND CARE | Age: 53
Discharge: HOME | End: 2018-11-07
Payer: MEDICARE

## 2018-11-07 VITALS
TEMPERATURE: 97 F | WEIGHT: 257.6 LBS | SYSTOLIC BLOOD PRESSURE: 103 MMHG | RESPIRATION RATE: 18 BRPM | BODY MASS INDEX: 33.06 KG/M2 | HEIGHT: 74 IN | HEART RATE: 80 BPM | DIASTOLIC BLOOD PRESSURE: 72 MMHG

## 2018-11-07 PROCEDURE — 11046 DBRDMT MUSC&/FSCA EA ADDL: CPT

## 2018-11-07 PROCEDURE — 97598 DBRDMT OPN WND ADDL 20CM/<: CPT

## 2018-11-07 PROCEDURE — 97597 DBRDMT OPN WND 1ST 20 CM/<: CPT | Performed by: INTERNAL MEDICINE

## 2018-11-07 PROCEDURE — 11046 DBRDMT MUSC&/FSCA EA ADDL: CPT | Performed by: INTERNAL MEDICINE

## 2018-11-07 PROCEDURE — 97597 DBRDMT OPN WND 1ST 20 CM/<: CPT

## 2018-11-07 PROCEDURE — 11043 DBRDMT MUSC&/FSCA 1ST 20/<: CPT | Performed by: INTERNAL MEDICINE

## 2018-11-07 PROCEDURE — 97598 DBRDMT OPN WND ADDL 20CM/<: CPT | Performed by: INTERNAL MEDICINE

## 2018-11-07 PROCEDURE — 97605 NEG PRS WND THER DME<=50SQCM: CPT

## 2018-11-07 PROCEDURE — 11043 DBRDMT MUSC&/FSCA 1ST 20/<: CPT

## 2018-11-07 RX ORDER — LIDOCAINE 40 MG/G
CREAM TOPICAL PRN
Status: DISCONTINUED | OUTPATIENT
Start: 2018-11-07 | End: 2018-11-08 | Stop reason: HOSPADM

## 2018-11-07 ASSESSMENT — PAIN DESCRIPTION - ONSET: ONSET: ON-GOING

## 2018-11-07 ASSESSMENT — PAIN DESCRIPTION - DIRECTION: RADIATING_TOWARDS: DENIES

## 2018-11-07 ASSESSMENT — PAIN DESCRIPTION - DESCRIPTORS: DESCRIPTORS: ACHING;BURNING;CONSTANT

## 2018-11-07 ASSESSMENT — PAIN DESCRIPTION - ORIENTATION: ORIENTATION: LEFT

## 2018-11-07 ASSESSMENT — ACTIVITIES OF DAILY LIVING (ADL): EFFECT OF PAIN ON DAILY ACTIVITIES: TURNING

## 2018-11-07 ASSESSMENT — PAIN DESCRIPTION - PAIN TYPE: TYPE: CHRONIC PAIN

## 2018-11-07 ASSESSMENT — PAIN DESCRIPTION - PROGRESSION: CLINICAL_PROGRESSION: NOT CHANGED

## 2018-11-07 ASSESSMENT — PAIN DESCRIPTION - FREQUENCY: FREQUENCY: CONTINUOUS

## 2018-11-07 ASSESSMENT — PAIN DESCRIPTION - LOCATION: LOCATION: SHOULDER

## 2018-11-07 ASSESSMENT — PAIN SCALES - GENERAL: PAINLEVEL_OUTOF10: 8

## 2018-11-12 DIAGNOSIS — K59.01 CONSTIPATION BY DELAYED COLONIC TRANSIT: ICD-10-CM

## 2018-11-12 DIAGNOSIS — M62.838 MUSCLE SPASM: ICD-10-CM

## 2018-11-12 RX ORDER — DOCUSATE SODIUM 100 MG
CAPSULE ORAL
Qty: 180 CAPSULE | Refills: 0 | Status: SHIPPED | OUTPATIENT
Start: 2018-11-12 | End: 2019-01-02 | Stop reason: ALTCHOICE

## 2018-11-12 RX ORDER — SENNOSIDES 8.6 MG
TABLET ORAL
Qty: 180 TABLET | Refills: 3 | OUTPATIENT
Start: 2018-11-12

## 2018-11-13 ENCOUNTER — HOSPITAL ENCOUNTER (OUTPATIENT)
Dept: WOUND CARE | Age: 53
Discharge: HOME | End: 2018-11-13
Payer: MEDICARE

## 2018-11-13 VITALS
DIASTOLIC BLOOD PRESSURE: 64 MMHG | HEIGHT: 74 IN | TEMPERATURE: 96.8 F | SYSTOLIC BLOOD PRESSURE: 101 MMHG | WEIGHT: 268.06 LBS | BODY MASS INDEX: 34.4 KG/M2 | HEART RATE: 80 BPM | RESPIRATION RATE: 16 BRPM

## 2018-11-13 LAB
BASOPHILS ABSOLUTE: 0.2 K/UL (ref 0–0.2)
BASOPHILS RELATIVE PERCENT: 1.8 %
C-REACTIVE PROTEIN: 144.2 MG/L (ref 0–5.1)
EOSINOPHILS ABSOLUTE: 0.3 K/UL (ref 0–0.6)
EOSINOPHILS RELATIVE PERCENT: 2.8 %
HCT VFR BLD CALC: 24.4 % (ref 40.5–52.5)
HEMOGLOBIN: 7.3 G/DL (ref 13.5–17.5)
LYMPHOCYTES ABSOLUTE: 1.3 K/UL (ref 1–5.1)
LYMPHOCYTES RELATIVE PERCENT: 12.7 %
MCH RBC QN AUTO: 21.5 PG (ref 26–34)
MCHC RBC AUTO-ENTMCNC: 30.1 G/DL (ref 31–36)
MCV RBC AUTO: 71.5 FL (ref 80–100)
MONOCYTES ABSOLUTE: 0.8 K/UL (ref 0–1.3)
MONOCYTES RELATIVE PERCENT: 7.7 %
NEUTROPHILS ABSOLUTE: 7.6 K/UL (ref 1.7–7.7)
NEUTROPHILS RELATIVE PERCENT: 75 %
PDW BLD-RTO: 20.1 % (ref 12.4–15.4)
PLATELET # BLD: 551 K/UL (ref 135–450)
PMV BLD AUTO: 8.1 FL (ref 5–10.5)
RBC # BLD: 3.41 M/UL (ref 4.2–5.9)
SEDIMENTATION RATE, ERYTHROCYTE: 103 MM/HR (ref 0–20)
WBC # BLD: 10.2 K/UL (ref 4–11)

## 2018-11-13 PROCEDURE — 11046 DBRDMT MUSC&/FSCA EA ADDL: CPT

## 2018-11-13 PROCEDURE — 11044 DBRDMT BONE 1ST 20 SQ CM/<: CPT | Performed by: INTERNAL MEDICINE

## 2018-11-13 PROCEDURE — 85025 COMPLETE CBC W/AUTO DIFF WBC: CPT

## 2018-11-13 PROCEDURE — 36415 COLL VENOUS BLD VENIPUNCTURE: CPT

## 2018-11-13 PROCEDURE — 11047 DBRDMT BONE EACH ADDL: CPT

## 2018-11-13 PROCEDURE — 97598 DBRDMT OPN WND ADDL 20CM/<: CPT

## 2018-11-13 PROCEDURE — 11046 DBRDMT MUSC&/FSCA EA ADDL: CPT | Performed by: INTERNAL MEDICINE

## 2018-11-13 PROCEDURE — 97605 NEG PRS WND THER DME<=50SQCM: CPT

## 2018-11-13 PROCEDURE — 86140 C-REACTIVE PROTEIN: CPT

## 2018-11-13 PROCEDURE — 11043 DBRDMT MUSC&/FSCA 1ST 20/<: CPT

## 2018-11-13 PROCEDURE — 11043 DBRDMT MUSC&/FSCA 1ST 20/<: CPT | Performed by: INTERNAL MEDICINE

## 2018-11-13 PROCEDURE — 11047 DBRDMT BONE EACH ADDL: CPT | Performed by: INTERNAL MEDICINE

## 2018-11-13 PROCEDURE — 85652 RBC SED RATE AUTOMATED: CPT

## 2018-11-13 PROCEDURE — 97597 DBRDMT OPN WND 1ST 20 CM/<: CPT | Performed by: INTERNAL MEDICINE

## 2018-11-13 PROCEDURE — 97598 DBRDMT OPN WND ADDL 20CM/<: CPT | Performed by: INTERNAL MEDICINE

## 2018-11-13 PROCEDURE — 11044 DBRDMT BONE 1ST 20 SQ CM/<: CPT

## 2018-11-13 PROCEDURE — 97597 DBRDMT OPN WND 1ST 20 CM/<: CPT

## 2018-11-13 RX ORDER — METRONIDAZOLE 500 MG/1
TABLET ORAL
Qty: 60 TABLET | Refills: 2 | Status: SHIPPED | OUTPATIENT
Start: 2018-11-13 | End: 2019-08-30 | Stop reason: ALTCHOICE

## 2018-11-13 RX ORDER — LIDOCAINE 40 MG/G
CREAM TOPICAL ONCE
Status: DISCONTINUED | OUTPATIENT
Start: 2018-11-13 | End: 2018-11-14 | Stop reason: HOSPADM

## 2018-11-13 ASSESSMENT — PAIN DESCRIPTION - PAIN TYPE: TYPE: CHRONIC PAIN

## 2018-11-13 ASSESSMENT — PAIN DESCRIPTION - ONSET: ONSET: ON-GOING

## 2018-11-13 ASSESSMENT — PAIN DESCRIPTION - LOCATION: LOCATION: SHOULDER

## 2018-11-13 ASSESSMENT — PAIN SCALES - GENERAL: PAINLEVEL_OUTOF10: 8

## 2018-11-13 ASSESSMENT — PAIN DESCRIPTION - DESCRIPTORS: DESCRIPTORS: BURNING;STABBING

## 2018-11-13 ASSESSMENT — PAIN DESCRIPTION - PROGRESSION: CLINICAL_PROGRESSION: NOT CHANGED

## 2018-11-13 ASSESSMENT — PAIN DESCRIPTION - FREQUENCY: FREQUENCY: INTERMITTENT

## 2018-11-13 ASSESSMENT — PAIN DESCRIPTION - ORIENTATION: ORIENTATION: RIGHT;LEFT

## 2018-11-15 ENCOUNTER — APPOINTMENT (OUTPATIENT)
Dept: GENERAL RADIOLOGY | Age: 53
End: 2018-11-15
Payer: MEDICARE

## 2018-11-15 ENCOUNTER — HOSPITAL ENCOUNTER (EMERGENCY)
Age: 53
Discharge: HOME | End: 2018-11-16
Attending: EMERGENCY MEDICINE
Payer: MEDICARE

## 2018-11-15 DIAGNOSIS — D64.9 ANEMIA, UNSPECIFIED TYPE: Primary | ICD-10-CM

## 2018-11-15 DIAGNOSIS — L89.109 PRESSURE INJURY OF SKIN OF BACK, UNSPECIFIED INJURY STAGE: ICD-10-CM

## 2018-11-15 DIAGNOSIS — R06.00 DYSPNEA, UNSPECIFIED TYPE: ICD-10-CM

## 2018-11-15 LAB
A/G RATIO: 0.7 (ref 1.1–2.2)
ABO/RH: NORMAL
ALBUMIN SERPL-MCNC: 2.8 G/DL (ref 3.4–5)
ALP BLD-CCNC: 144 U/L (ref 40–129)
ALT SERPL-CCNC: 6 U/L (ref 10–40)
ANION GAP SERPL CALCULATED.3IONS-SCNC: 14 MMOL/L (ref 3–16)
ANTIBODY SCREEN: NORMAL
AST SERPL-CCNC: 11 U/L (ref 15–37)
BASOPHILS ABSOLUTE: 0.1 K/UL (ref 0–0.2)
BASOPHILS RELATIVE PERCENT: 1.3 %
BILIRUB SERPL-MCNC: 0.3 MG/DL (ref 0–1)
BUN BLDV-MCNC: 32 MG/DL (ref 7–20)
CALCIUM SERPL-MCNC: 8.3 MG/DL (ref 8.3–10.6)
CHLORIDE BLD-SCNC: 93 MMOL/L (ref 99–110)
CO2: 23 MMOL/L (ref 21–32)
CREAT SERPL-MCNC: 0.9 MG/DL (ref 0.9–1.3)
EOSINOPHILS ABSOLUTE: 0 K/UL (ref 0–0.6)
EOSINOPHILS RELATIVE PERCENT: 0 %
GFR AFRICAN AMERICAN: >60
GFR NON-AFRICAN AMERICAN: >60
GLOBULIN: 4.3 G/DL
GLUCOSE BLD-MCNC: 170 MG/DL (ref 70–99)
HCT VFR BLD CALC: 20.5 % (ref 40.5–52.5)
HEMOGLOBIN: 6.4 G/DL (ref 13.5–17.5)
LYMPHOCYTES ABSOLUTE: 0.7 K/UL (ref 1–5.1)
LYMPHOCYTES RELATIVE PERCENT: 7.4 %
MCH RBC QN AUTO: 21.8 PG (ref 26–34)
MCHC RBC AUTO-ENTMCNC: 31 G/DL (ref 31–36)
MCV RBC AUTO: 70.1 FL (ref 80–100)
MONOCYTES ABSOLUTE: 0.9 K/UL (ref 0–1.3)
MONOCYTES RELATIVE PERCENT: 9 %
NEUTROPHILS ABSOLUTE: 8 K/UL (ref 1.7–7.7)
NEUTROPHILS RELATIVE PERCENT: 82.3 %
OCCULT BLOOD DIAGNOSTIC: NORMAL
PDW BLD-RTO: 20.6 % (ref 12.4–15.4)
PLATELET # BLD: 471 K/UL (ref 135–450)
PMV BLD AUTO: 7.7 FL (ref 5–10.5)
POTASSIUM SERPL-SCNC: 4.3 MMOL/L (ref 3.5–5.1)
RBC # BLD: 2.92 M/UL (ref 4.2–5.9)
SODIUM BLD-SCNC: 130 MMOL/L (ref 136–145)
TOTAL PROTEIN: 7.1 G/DL (ref 6.4–8.2)
WBC # BLD: 9.8 K/UL (ref 4–11)

## 2018-11-15 PROCEDURE — 93010 ELECTROCARDIOGRAM REPORT: CPT | Performed by: INTERNAL MEDICINE

## 2018-11-15 PROCEDURE — 86900 BLOOD TYPING SEROLOGIC ABO: CPT

## 2018-11-15 PROCEDURE — 86923 COMPATIBILITY TEST ELECTRIC: CPT

## 2018-11-15 PROCEDURE — 71045 X-RAY EXAM CHEST 1 VIEW: CPT

## 2018-11-15 PROCEDURE — G0328 FECAL BLOOD SCRN IMMUNOASSAY: HCPCS

## 2018-11-15 PROCEDURE — 93005 ELECTROCARDIOGRAM TRACING: CPT | Performed by: EMERGENCY MEDICINE

## 2018-11-15 PROCEDURE — 85018 HEMOGLOBIN: CPT

## 2018-11-15 PROCEDURE — 6370000000 HC RX 637 (ALT 250 FOR IP): Performed by: NURSE PRACTITIONER

## 2018-11-15 PROCEDURE — 36430 TRANSFUSION BLD/BLD COMPNT: CPT

## 2018-11-15 PROCEDURE — 99285 EMERGENCY DEPT VISIT HI MDM: CPT

## 2018-11-15 PROCEDURE — 80053 COMPREHEN METABOLIC PANEL: CPT

## 2018-11-15 PROCEDURE — 85025 COMPLETE CBC W/AUTO DIFF WBC: CPT

## 2018-11-15 PROCEDURE — 2580000003 HC RX 258: Performed by: NURSE PRACTITIONER

## 2018-11-15 PROCEDURE — 86850 RBC ANTIBODY SCREEN: CPT

## 2018-11-15 PROCEDURE — 96360 HYDRATION IV INFUSION INIT: CPT

## 2018-11-15 PROCEDURE — 85014 HEMATOCRIT: CPT

## 2018-11-15 PROCEDURE — 86901 BLOOD TYPING SEROLOGIC RH(D): CPT

## 2018-11-15 PROCEDURE — P9016 RBC LEUKOCYTES REDUCED: HCPCS

## 2018-11-15 RX ORDER — DOCUSATE SODIUM 100 MG/1
200 CAPSULE, LIQUID FILLED ORAL
COMMUNITY
End: 2019-02-13 | Stop reason: ALTCHOICE

## 2018-11-15 RX ORDER — FERROUS SULFATE 325(65) MG
650 TABLET ORAL NIGHTLY
COMMUNITY
End: 2018-11-21 | Stop reason: SDUPTHER

## 2018-11-15 RX ORDER — 0.9 % SODIUM CHLORIDE 0.9 %
250 INTRAVENOUS SOLUTION INTRAVENOUS ONCE
Status: DISCONTINUED | OUTPATIENT
Start: 2018-11-15 | End: 2018-11-16 | Stop reason: HOSPADM

## 2018-11-15 RX ORDER — ACETAMINOPHEN 325 MG/1
650 TABLET ORAL ONCE
Status: COMPLETED | OUTPATIENT
Start: 2018-11-15 | End: 2018-11-15

## 2018-11-15 RX ORDER — 0.9 % SODIUM CHLORIDE 0.9 %
1000 INTRAVENOUS SOLUTION INTRAVENOUS ONCE
Status: COMPLETED | OUTPATIENT
Start: 2018-11-15 | End: 2018-11-16

## 2018-11-15 RX ORDER — SENNOSIDES 8.6 MG
2 TABLET ORAL
COMMUNITY
End: 2018-11-26 | Stop reason: SDUPTHER

## 2018-11-15 RX ORDER — FERROUS SULFATE 325(65) MG
325 TABLET ORAL
COMMUNITY
End: 2018-11-19

## 2018-11-15 RX ADMIN — SODIUM CHLORIDE 1000 ML: 9 INJECTION, SOLUTION INTRAVENOUS at 23:52

## 2018-11-15 RX ADMIN — ACETAMINOPHEN 650 MG: 325 TABLET ORAL at 20:42

## 2018-11-15 ASSESSMENT — PAIN DESCRIPTION - ORIENTATION: ORIENTATION: LEFT

## 2018-11-15 ASSESSMENT — ENCOUNTER SYMPTOMS
WHEEZING: 0
BLOOD IN STOOL: 0
CONSTIPATION: 0
COUGH: 0
SHORTNESS OF BREATH: 1
DIARRHEA: 0
CHEST TIGHTNESS: 0
ABDOMINAL DISTENTION: 0
VOMITING: 0

## 2018-11-15 ASSESSMENT — PAIN DESCRIPTION - PAIN TYPE: TYPE: CHRONIC PAIN

## 2018-11-15 ASSESSMENT — PAIN DESCRIPTION - FREQUENCY: FREQUENCY: CONTINUOUS

## 2018-11-15 ASSESSMENT — PAIN DESCRIPTION - LOCATION: LOCATION: NECK;SHOULDER

## 2018-11-15 ASSESSMENT — PAIN SCALES - GENERAL
PAINLEVEL_OUTOF10: 3
PAINLEVEL_OUTOF10: 3

## 2018-11-15 ASSESSMENT — PAIN DESCRIPTION - DESCRIPTORS: DESCRIPTORS: ACHING

## 2018-11-16 VITALS
BODY MASS INDEX: 32.11 KG/M2 | TEMPERATURE: 98.2 F | DIASTOLIC BLOOD PRESSURE: 74 MMHG | WEIGHT: 250.2 LBS | SYSTOLIC BLOOD PRESSURE: 116 MMHG | OXYGEN SATURATION: 99 % | HEIGHT: 74 IN | RESPIRATION RATE: 17 BRPM | HEART RATE: 91 BPM

## 2018-11-16 LAB
BLOOD BANK DISPENSE STATUS: NORMAL
BLOOD BANK DISPENSE STATUS: NORMAL
BLOOD BANK PRODUCT CODE: NORMAL
BLOOD BANK PRODUCT CODE: NORMAL
BPU ID: NORMAL
BPU ID: NORMAL
DESCRIPTION BLOOD BANK: NORMAL
DESCRIPTION BLOOD BANK: NORMAL
HCT VFR BLD CALC: 21.5 % (ref 40.5–52.5)
HCT VFR BLD CALC: 24.7 % (ref 40.5–52.5)
HEMOGLOBIN: 6.8 G/DL (ref 13.5–17.5)
HEMOGLOBIN: 7.9 G/DL (ref 13.5–17.5)

## 2018-11-16 PROCEDURE — 85014 HEMATOCRIT: CPT

## 2018-11-16 PROCEDURE — 85018 HEMOGLOBIN: CPT

## 2018-11-16 PROCEDURE — 36430 TRANSFUSION BLD/BLD COMPNT: CPT

## 2018-11-16 PROCEDURE — 96361 HYDRATE IV INFUSION ADD-ON: CPT

## 2018-11-16 PROCEDURE — P9016 RBC LEUKOCYTES REDUCED: HCPCS

## 2018-11-16 NOTE — ED NOTES
Pt states he does not have any stool in his colostomy bag at this time but will let RN know when he does to test it. Will continue to monitor.       Terri Wright RN  11/15/18 1134

## 2018-11-16 NOTE — ED PROVIDER NOTES
I independently examined and evaluated Ariella Lantigua. In brief, patient presenting for evaluation of shortness of breath. Patient is paraplegic. Patient reportedly was going to have an outpatient transfusion done due to anemia but given the shortness of breath was advised to come to the emergency department for further evaluation. .    Focused exam revealed patient is chronically ill-appearing but in no acute distress. Heart is noted to be regular. Lungs grossly clear. .  No increased work of breathing or respiratory distress. Patient is awake alert and oriented. The Ekg interpreted by me shows  sinus tachycardia, [id number]  Axis is   Normal  QTc is  within an acceptable range  Intervals and Durations are unremarkable. ST Segments: nonspecific changes  No significant change from prior EKG dated 9/4/18    Imaging:  Xr Chest Portable    Result Date: 11/15/2018  EXAMINATION: SINGLE XRAY VIEW OF THE CHEST 11/15/2018 5:29 pm COMPARISON: 09/04/2018 HISTORY: ORDERING SYSTEM PROVIDED HISTORY: sob TECHNOLOGIST PROVIDED HISTORY: Reason for exam:->sob Ordering Physician Provided Reason for Exam: pt states that he has been sob x 1 day Acuity: Acute Type of Exam: Initial FINDINGS: There are low lung volumes with secondary bronchovascular crowding. There is redemonstration of mild right hemidiaphragmatic elevation. Focal infiltrate is not identified. No pneumothorax. The cardial-pericardial silhouette is stable. Rhythm recorder is again noted superimposed over the left lower hemithorax. Postsurgical changes in the thoracic spine are found. No acute bony abnormalities detected. No acute abnormality detected. ED course: Patient presenting for evaluation of anemia just symptomatic shortness of breath. He is type and crossed. Plan to transfuse initially 1 unit.   Potential admission was discussed with the hospitalist but given that patient initially was going to have an outpatient transfusion, it
Provider with the below findings:    Interpretation per theRadiologist below, if available at the time of this note:    XR CHEST PORTABLE   Final Result   No acute abnormality detected. No results found. PROCEDURES   Unless otherwise noted below, none     Procedures    CRITICAL CARE TIME   N/A    CONSULTS:  IP CONSULT TO HOSPITALIST      EMERGENCY DEPARTMENT COURSE and DIFFERENTIALDIAGNOSIS/MDM:   Vitals:    Vitals:    11/15/18 1952 11/15/18 2129 11/15/18 2145 11/15/18 2200   BP: 117/78 124/68 (!) 106/55 112/63   Pulse: 110 97 93 92   Resp: 18 20 18 17   Temp: 100.6 °F (38.1 °C) 99.3 °F (37.4 °C) 99.5 °F (37.5 °C) 98.6 °F (37 °C)   TempSrc: Oral Oral Oral Oral   SpO2: 99% 100% 100% 100%   Weight: 250 lb 3.2 oz (113.5 kg)      Height: 6' 2\" (1.88 m)          Patient was given thefollowing medications:  Medications   0.9 % sodium chloride bolus (not administered)   0.9 % sodium chloride bolus (not administered)   acetaminophen (TYLENOL) tablet 650 mg (650 mg Oral Given 11/15/18 2042)       Patient presented to the emergency department with complaints of low hemoglobin. He also was feeling quite short of breath. EMS reports saturation of 90% on room air. He arrives to the ED on oxygen. He denies any chest pain or other symptoms. He reports that he becomes anemic secondary to his pressure ulcers. He was seen Tuesday by wound care and reports that they had difficulty stopping some bleeding on his pressure ulcer on his right foot. Ulcers did not appear infected. Hemoglobin today was 6.4. I did order 2 units packed red blood cells for the patient. They were beginning to be transfused at the time I left the emergency department. I did speak with the hospitalist regarding this patient and he is requesting Hemoccult. Patient does have a colostomy bag that is currently empty. Patient denies any changes to his stool or abdominal pain.   I handed over care of this patient to Dr. Ramona Lay, see her note for

## 2018-11-16 NOTE — ED NOTES
Pt's 1st unit of blood complete. Pt has no signs of transfusion reaction. Will continue to monitor.       Lul Navarrete RN  11/16/18 6024

## 2018-11-17 DIAGNOSIS — D63.8 ANEMIA OF CHRONIC DISORDER: ICD-10-CM

## 2018-11-19 RX ORDER — FERROUS SULFATE 325(65) MG
TABLET ORAL
Qty: 270 TABLET | Refills: 0 | Status: SHIPPED | OUTPATIENT
Start: 2018-11-19 | End: 2019-02-22 | Stop reason: SDUPTHER

## 2018-11-20 PROBLEM — L89.894 PRESSURE ULCER OF LEFT FOOT, STAGE 4 (HCC): Status: RESOLVED | Noted: 2018-08-12 | Resolved: 2018-11-20

## 2018-11-20 PROBLEM — L89.890 UNSTAGEABLE PRESSURE ULCER OF RIGHT FOOT (HCC): Status: RESOLVED | Noted: 2018-10-17 | Resolved: 2018-11-20

## 2018-11-20 NOTE — PROGRESS NOTES
MERGED WITH #41 11/7/18-Wound Width (cm): 1.5 cm  Wound 08/16/17 #27- Thoracic spine, dehisced surgical wound, full thickness, onset 8/16/2017, pressure-Wound Width (cm): 5 cm  Wound 10/17/14  #8, right ischium, stage 4 PU, onset 7/15/14 (merged with #30), pressure-Wound Width (cm): 7.7 cm  Wound 10/25/17 #29, right heel & lateral foot, DFU, Mccauley 3 onset 10/25/17, pressure-Wound Width (cm): 5.5 cm    [REMOVED] Wound 08/07/18 #33, left lateral (mid) foot, Pressure ulcer, stage IV, (onset 8/2018) pressure -Wound Depth (cm) : 0  Wound 10/10/18 #41 Right plantar foot, DFU, Mccauley 2 onset 10/1/18, pressure  MERGED WITH #37 11/7/18-Wound Depth (cm) : 1.5  Wound 09/26/18 # 38, Right Lower Lateral Leg, Venous, Partial Thickness, onset 9/23/18, gradually appeared-Wound Depth (cm) : 0.2  [REMOVED] Wound 09/12/18 #37 Right lateral distal foot, Surgical Dehiscense, full thickness (onset 8/29/18) Surgical  MERGED WITH #41 11/7/18-Wound Depth (cm) : 2.4  Wound 08/16/17 #27- Thoracic spine, dehisced surgical wound, full thickness, onset 8/16/2017, pressure-Wound Depth (cm) : 1.1  Wound 10/17/14  #8, right ischium, stage 4 PU, onset 7/15/14 (merged with #30), pressure-Wound Depth (cm) : 2.9  Wound 10/25/17 #29, right heel & lateral foot, DFU, Mccauley 3 onset 10/25/17, pressure-Wound Depth (cm) : 0.8    The ulcers were then irrigated with normal saline solution. The procedure was completed with a moderate amount of bleeding, and hemostasis was by pressure, by silver nitrate stick and ORC. The patient tolerated the procedure well, with no significant complications. The patient's level of pain during and after the procedure was monitored, and is noted in the wound documentation flowsheet.     Discharge plan:     Treatment in the wound care center today: Wound 10/10/18 #41 Right plantar foot, DFU, Mccauley 2 onset 10/1/18, pressure  MERGED WITH #37 11/7/18-Dressing/Treatment: ABD, Alginate with Ag, Triad hydro, 4x4 (kerlix, single f

## 2018-11-21 ENCOUNTER — HOSPITAL ENCOUNTER (OUTPATIENT)
Dept: WOUND CARE | Age: 53
Discharge: HOME | End: 2018-11-21
Payer: MEDICARE

## 2018-11-21 VITALS
DIASTOLIC BLOOD PRESSURE: 61 MMHG | HEIGHT: 74 IN | SYSTOLIC BLOOD PRESSURE: 97 MMHG | RESPIRATION RATE: 16 BRPM | BODY MASS INDEX: 32.12 KG/M2 | HEART RATE: 84 BPM | TEMPERATURE: 97 F

## 2018-11-21 PROCEDURE — 17250 CHEM CAUT OF GRANLTJ TISSUE: CPT

## 2018-11-21 PROCEDURE — 97598 DBRDMT OPN WND ADDL 20CM/<: CPT

## 2018-11-21 PROCEDURE — 97597 DBRDMT OPN WND 1ST 20 CM/<: CPT

## 2018-11-21 PROCEDURE — 11045 DBRDMT SUBQ TISS EACH ADDL: CPT | Performed by: INTERNAL MEDICINE

## 2018-11-21 PROCEDURE — 11042 DBRDMT SUBQ TIS 1ST 20SQCM/<: CPT

## 2018-11-21 PROCEDURE — 97598 DBRDMT OPN WND ADDL 20CM/<: CPT | Performed by: INTERNAL MEDICINE

## 2018-11-21 PROCEDURE — 11042 DBRDMT SUBQ TIS 1ST 20SQCM/<: CPT | Performed by: INTERNAL MEDICINE

## 2018-11-21 PROCEDURE — 11045 DBRDMT SUBQ TISS EACH ADDL: CPT

## 2018-11-21 PROCEDURE — 97597 DBRDMT OPN WND 1ST 20 CM/<: CPT | Performed by: INTERNAL MEDICINE

## 2018-11-21 RX ORDER — LIDOCAINE 40 MG/G
CREAM TOPICAL ONCE
Status: DISCONTINUED | OUTPATIENT
Start: 2018-11-21 | End: 2018-11-22 | Stop reason: HOSPADM

## 2018-11-21 ASSESSMENT — PAIN DESCRIPTION - ONSET: ONSET: ON-GOING

## 2018-11-21 ASSESSMENT — PAIN DESCRIPTION - PROGRESSION: CLINICAL_PROGRESSION: NOT CHANGED

## 2018-11-21 ASSESSMENT — PAIN DESCRIPTION - LOCATION: LOCATION: SHOULDER;NECK

## 2018-11-21 ASSESSMENT — PAIN DESCRIPTION - FREQUENCY: FREQUENCY: INTERMITTENT

## 2018-11-21 ASSESSMENT — PAIN DESCRIPTION - PAIN TYPE: TYPE: CHRONIC PAIN

## 2018-11-21 ASSESSMENT — PAIN SCALES - GENERAL: PAINLEVEL_OUTOF10: 3

## 2018-11-21 ASSESSMENT — PAIN DESCRIPTION - DESCRIPTORS: DESCRIPTORS: ACHING;BURNING;STABBING

## 2018-11-21 ASSESSMENT — PAIN DESCRIPTION - ORIENTATION: ORIENTATION: RIGHT;LEFT

## 2018-11-21 NOTE — FLOWSHEET NOTE
Triad and mepilex border to Left Pre-tib per MD. Polysporin and mepilex border to Left Lateral ankle per MD.

## 2018-11-21 NOTE — PLAN OF CARE
Problem: Wound:  Goal: Will show signs of wound healing; wound closure and no evidence of infection  Will show signs of wound healing; wound closure and no evidence of infection   Outcome: Ongoing  Left heel wound stable & improved some this week, Ag Nitrate used per MD. Left foot wounds healed & leg wounds stable, no debridement. Will cont. With current wound care regime & football dressing for off-loading on LLE. Pt. Also cont. To use Heelmedix off-loading boots bilaterally at all times. Right heel noted to have bone exposure this week, debridement per MD. Right distal foot also debrided per MD. Will cont. With daily dressings to foot & heel wounds. Pt. to see Dr. Boris Dick next Wednesday 11/28/18 after his [address] [place] [address] appt. . Dr. Royce Kimbrough again advised pt. He may need further surgery, including amputation in the future & pt. Verbalizes understanding. Ischial wound also noted to have bone exposure, debridement of wound per MD, will stop bolster to wound, otherwise cont. With current wound care regime. Thoracic wound noted to have increase in tunnel & tan drainage, will hold NPWT this week. Will place Iodoform into tunnel & Ag Alginate to wound. Reinforced importance of frequent repositioning from side to side & minimal time spent in his chair. Pt. Also advised to f/u with his PCP re: chronic anemia & plan for treatment & labs. F/u in [address] [place] [address] in 1 week with Dr. Royce Kimbrough. Discharge instructions reviewed with patient, all questions answered, copy given to patient. Dressings were applied to all wounds per M.D. Instructions at this visit.

## 2018-11-26 ENCOUNTER — TELEPHONE (OUTPATIENT)
Dept: INTERNAL MEDICINE CLINIC | Age: 53
End: 2018-11-26

## 2018-11-26 RX ORDER — SENNOSIDES 8.6 MG
2 TABLET ORAL 2 TIMES DAILY
Qty: 360 TABLET | Refills: 0 | Status: SHIPPED | OUTPATIENT
Start: 2018-11-26 | End: 2019-01-02 | Stop reason: ALTCHOICE

## 2018-11-26 RX ORDER — METOPROLOL SUCCINATE 100 MG/1
100 TABLET, EXTENDED RELEASE ORAL NIGHTLY
Qty: 90 TABLET | Refills: 0 | Status: SHIPPED | OUTPATIENT
Start: 2018-11-26 | End: 2019-03-29 | Stop reason: SDUPTHER

## 2018-11-28 ENCOUNTER — HOSPITAL ENCOUNTER (OUTPATIENT)
Dept: WOUND CARE | Age: 53
Discharge: HOME | End: 2018-11-28
Payer: MEDICARE

## 2018-11-28 VITALS
RESPIRATION RATE: 18 BRPM | HEART RATE: 79 BPM | BODY MASS INDEX: 33.01 KG/M2 | WEIGHT: 257.2 LBS | HEIGHT: 74 IN | SYSTOLIC BLOOD PRESSURE: 99 MMHG | TEMPERATURE: 96.8 F | DIASTOLIC BLOOD PRESSURE: 67 MMHG

## 2018-11-28 PROCEDURE — 11043 DBRDMT MUSC&/FSCA 1ST 20/<: CPT | Performed by: INTERNAL MEDICINE

## 2018-11-28 PROCEDURE — 97597 DBRDMT OPN WND 1ST 20 CM/<: CPT | Performed by: INTERNAL MEDICINE

## 2018-11-28 PROCEDURE — 11042 DBRDMT SUBQ TIS 1ST 20SQCM/<: CPT

## 2018-11-28 PROCEDURE — 97598 DBRDMT OPN WND ADDL 20CM/<: CPT | Performed by: INTERNAL MEDICINE

## 2018-11-28 PROCEDURE — 11042 DBRDMT SUBQ TIS 1ST 20SQCM/<: CPT | Performed by: INTERNAL MEDICINE

## 2018-11-28 PROCEDURE — 97597 DBRDMT OPN WND 1ST 20 CM/<: CPT

## 2018-11-28 PROCEDURE — 11043 DBRDMT MUSC&/FSCA 1ST 20/<: CPT

## 2018-11-28 PROCEDURE — 97598 DBRDMT OPN WND ADDL 20CM/<: CPT

## 2018-11-28 RX ORDER — LIDOCAINE 40 MG/G
CREAM TOPICAL ONCE
Status: DISCONTINUED | OUTPATIENT
Start: 2018-11-28 | End: 2018-11-29 | Stop reason: HOSPADM

## 2018-11-28 ASSESSMENT — PAIN DESCRIPTION - ORIENTATION: ORIENTATION: RIGHT;LEFT

## 2018-11-28 ASSESSMENT — PAIN DESCRIPTION - FREQUENCY: FREQUENCY: INTERMITTENT

## 2018-11-28 ASSESSMENT — PAIN DESCRIPTION - PROGRESSION: CLINICAL_PROGRESSION: NOT CHANGED

## 2018-11-28 ASSESSMENT — PAIN DESCRIPTION - DESCRIPTORS: DESCRIPTORS: ACHING;BURNING

## 2018-11-28 ASSESSMENT — PAIN DESCRIPTION - ONSET: ONSET: ON-GOING

## 2018-11-28 ASSESSMENT — PAIN DESCRIPTION - LOCATION: LOCATION: NECK;SHOULDER

## 2018-11-28 ASSESSMENT — PAIN DESCRIPTION - PAIN TYPE: TYPE: CHRONIC PAIN

## 2018-11-28 ASSESSMENT — PAIN SCALES - GENERAL: PAINLEVEL_OUTOF10: 4

## 2018-12-03 NOTE — PROGRESS NOTES
[facility] Progress Note    Janet Garcia     : 1965    DATE OF VISIT:  2018    Subjective:     Janet Garcia is a 50 y.o. male who has a non-healing surgical ulcer located on the back. Significant symptoms or pertinent wound history since last visit: doing ok in general, though he had to go to the ER late last week for shortness of breath / symptomatic anemia. Received 2 units of PRBCs, and was just under observation for less than a day. Feeling better today, no SOB or CP, no fever or chills. Ischium and right heel still draining significantly. Sees Dr. Boris Dick in [place] next week. Doing his best to limit chair time. Additional ulcer(s) noted? yes. Right hip and ischium, BL heels, right forefoot, a few small ulcers on BL lower legs and ankles. His current medication list consists of Apixaban, Insulin Lispro, aspirin, clopidogrel, cyclobenzaprine, docusate sodium, ferrous sulfate, insulin glargine, magnesium oxide, metFORMIN, metoclopramide, metoprolol succinate, metroNIDAZOLE, nitroGLYCERIN, nystatin, oxyCODONE, pantoprazole, promethazine, rosuvastatin, senna, torsemide, and traZODone. Allergies: Benadryl [diphenhydramine hcl]; Statins [statins]; Cephalexin; Morphine; Penicillins; and Sulfa antibiotics    Objective:     Vitals:    18 1107   BP: 97/61   Pulse: 84   Resp: 16   Temp: 97 °F (36.1 °C)   TempSrc: Oral   Height: 6' 2\" (1.88 m)     AAOx3, overweight, fatigued, not as pale as last week, NAD  Intact distal pulses, feet warm, good cap refill  Moderate BL LE edema, no cellulitis or angitis  Some contact dermatitis and tiny skin tears at back; no Candidiasis  [place]-ulcer skin: generally pink and indurated, less purple inflammatory change at left heel, mild erythema at right ischium, mild erythema and hyperkeratosis at right foot.   Ulcer(s): T-spine mostly red and granular, deeper tunnel at 3:00, some friability, fibrinous debris, serosanguinous exudate; stable small hip sinus tract; ischium large, still a bit of palpable bone, some areas a bit more granular, some deep soft tissue fibrosis, overlying fibrin and biofilm; right forefoot still mostly fat necrosis and exposed muscle / tendon, perhaps palpable bone, biofilm, just a bit of red tissue laterally; right heel ulcer large, parts fairly healthy and granular, one area more fibrotic and pale, a bit of new calcaneal exposure medially; left heel a bit more granular, still some SQ necrosis, biofilm, friable, hypergranulation; scattered small lower leg and ankle ulcers generally partial thickness, part granular, part fibrotic, some fibrinous debris. Pre-debridement wound measurements are in the wound documentation flowsheet. Photos and current wound measurements also saved in electronic chart. 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

## 2018-12-06 ENCOUNTER — HOSPITAL ENCOUNTER (OUTPATIENT)
Dept: WOUND CARE | Age: 53
Discharge: HOME | End: 2018-12-06
Payer: MEDICARE

## 2018-12-06 VITALS — SYSTOLIC BLOOD PRESSURE: 90 MMHG | DIASTOLIC BLOOD PRESSURE: 51 MMHG | TEMPERATURE: 97.2 F | RESPIRATION RATE: 20 BRPM

## 2018-12-06 LAB
BASOPHILS ABSOLUTE: 0.2 K/UL (ref 0–0.2)
BASOPHILS RELATIVE PERCENT: 1.8 %
EOSINOPHILS ABSOLUTE: 0.3 K/UL (ref 0–0.6)
EOSINOPHILS RELATIVE PERCENT: 2.8 %
GLUCOSE BLD-MCNC: 56 MG/DL (ref 70–99)
GLUCOSE BLD-MCNC: 81 MG/DL (ref 70–99)
HCT VFR BLD CALC: 25.9 % (ref 40.5–52.5)
HEMOGLOBIN: 7.7 G/DL (ref 13.5–17.5)
LYMPHOCYTES ABSOLUTE: 1.2 K/UL (ref 1–5.1)
LYMPHOCYTES RELATIVE PERCENT: 12 %
MCH RBC QN AUTO: 21.9 PG (ref 26–34)
MCHC RBC AUTO-ENTMCNC: 29.8 G/DL (ref 31–36)
MCV RBC AUTO: 73.3 FL (ref 80–100)
MONOCYTES ABSOLUTE: 0.8 K/UL (ref 0–1.3)
MONOCYTES RELATIVE PERCENT: 7.3 %
NEUTROPHILS ABSOLUTE: 7.8 K/UL (ref 1.7–7.7)
NEUTROPHILS RELATIVE PERCENT: 76.1 %
PDW BLD-RTO: 22.7 % (ref 12.4–15.4)
PERFORMED ON: ABNORMAL
PERFORMED ON: NORMAL
PLATELET # BLD: 443 K/UL (ref 135–450)
PMV BLD AUTO: 7.7 FL (ref 5–10.5)
RBC # BLD: 3.54 M/UL (ref 4.2–5.9)
WBC # BLD: 10.3 K/UL (ref 4–11)

## 2018-12-06 PROCEDURE — 17250 CHEM CAUT OF GRANLTJ TISSUE: CPT | Performed by: INTERNAL MEDICINE

## 2018-12-06 PROCEDURE — 11044 DBRDMT BONE 1ST 20 SQ CM/<: CPT

## 2018-12-06 PROCEDURE — 97598 DBRDMT OPN WND ADDL 20CM/<: CPT | Performed by: INTERNAL MEDICINE

## 2018-12-06 PROCEDURE — 85025 COMPLETE CBC W/AUTO DIFF WBC: CPT

## 2018-12-06 PROCEDURE — 97597 DBRDMT OPN WND 1ST 20 CM/<: CPT | Performed by: INTERNAL MEDICINE

## 2018-12-06 PROCEDURE — 11042 DBRDMT SUBQ TIS 1ST 20SQCM/<: CPT | Performed by: INTERNAL MEDICINE

## 2018-12-06 PROCEDURE — 36415 COLL VENOUS BLD VENIPUNCTURE: CPT

## 2018-12-06 PROCEDURE — 97598 DBRDMT OPN WND ADDL 20CM/<: CPT

## 2018-12-06 PROCEDURE — 17250 CHEM CAUT OF GRANLTJ TISSUE: CPT

## 2018-12-06 PROCEDURE — 11047 DBRDMT BONE EACH ADDL: CPT

## 2018-12-06 PROCEDURE — 11045 DBRDMT SUBQ TISS EACH ADDL: CPT

## 2018-12-06 PROCEDURE — 11045 DBRDMT SUBQ TISS EACH ADDL: CPT | Performed by: INTERNAL MEDICINE

## 2018-12-06 PROCEDURE — 11042 DBRDMT SUBQ TIS 1ST 20SQCM/<: CPT

## 2018-12-06 RX ORDER — LIDOCAINE 40 MG/G
CREAM TOPICAL ONCE
Status: DISCONTINUED | OUTPATIENT
Start: 2018-12-06 | End: 2018-12-07 | Stop reason: HOSPADM

## 2018-12-06 ASSESSMENT — PAIN DESCRIPTION - PAIN TYPE: TYPE: CHRONIC PAIN

## 2018-12-06 ASSESSMENT — PAIN SCALES - GENERAL: PAINLEVEL_OUTOF10: 3

## 2018-12-06 ASSESSMENT — PAIN DESCRIPTION - PROGRESSION: CLINICAL_PROGRESSION: NOT CHANGED

## 2018-12-06 ASSESSMENT — PAIN DESCRIPTION - DESCRIPTORS: DESCRIPTORS: BURNING;STABBING

## 2018-12-06 ASSESSMENT — PAIN DESCRIPTION - ONSET: ONSET: ON-GOING

## 2018-12-06 ASSESSMENT — PAIN DESCRIPTION - FREQUENCY: FREQUENCY: CONTINUOUS

## 2018-12-06 ASSESSMENT — PAIN DESCRIPTION - ORIENTATION: ORIENTATION: RIGHT

## 2018-12-06 ASSESSMENT — PAIN DESCRIPTION - LOCATION: LOCATION: SHOULDER;NECK

## 2018-12-06 NOTE — PROGRESS NOTES
friable, some lateral SQ fibronecrosis, otherwise fibrin and biofilm; right heel largely red and granular to hypergraunlar, friable, some exposed calcaneus; right plantar forefoot ulcer part granular but largely SQ necrosis, exposed muscle and tendon, part necrotic, and I think a bit of exposed bone; left heel part granular, part fibrotic, a bit of SQ necrosis at one edge, fibrin; other small LE ulcers partial thickness, more granular than superficially necrotic. Photos also saved in electronic chart. Today's wound measurements:  Wound 11/28/18 #43, Right dorsal ankle, pressure, stage 2, onset 11/27/18, pressure-Wound Length (cm): 3 cm    Wound 11/28/18 #43, Right dorsal ankle, pressure, stage 2, onset 11/27/18, pressure-Wound Width (cm): 2 cm    Wound 11/28/18 #43, Right dorsal ankle, pressure, stage 2, onset 11/27/18, pressure-Wound Depth (cm): 0.1 cm     (This was the one new wound today; measurements of all pre-existing wounds are in the electronic chart only, with the changes to Epic that took place this week.)    Assessment:     Patient Active Problem List   Diagnosis Code    Mixed hyperlipidemia E78.2    Coronary artery disease involving native coronary artery of native heart without angina pectoris I25.10    Type 2 diabetes mellitus with skin complication, with long-term current use of insulin ([facility]) E11.628, Z79.4    Paraplegia, complete ([address] G82.21    Chronic back pain M54.9, G89.29    Arthritis M19.90    Pressure ulcer of right hip, stage 4 ([address] L89.214    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 At this point I don't know that the right foot is salvageable, or if it is, I don't think it's worth the risk of additional procedures there, or worth the ongoing risks of bleeding and infection if we try to heal it. Factors contributing to occurrence and/or persistence of the chronic ulcer include edema, diabetes, chronic pressure, decreased mobility, shear force and obesity. Medical necessity of today's visit is shown by the above documentation. Sharp debridement is indicated today, based upon the exam findings in the wound(s) above. Procedure note:     Consent obtained. Time out performed per [facility] policy. Anesthetic  Anesthetic: 4% Lidocaine Liquid Topical     Using a curette, I sharply debrided the back ulcer(s) down through and including the removal of dermis. The type(s) of tissue debrided included fibrin and exudate. Total Surface Area Debrided: 25 sq cm. Using a curette, I sharply debrided the right ischium and left heel ulcer(s) down through and including the removal of subcutaneous tissue. The type(s) of tissue debrided included fibrin and necrotic/eschar. Total Surface Area Debrided: 15 sq cm. Using a curette, #15 blade scalpel and forceps, I sharply debrided the right plantar forefoot ulcer(s) down through and including the removal of muscle/fascia. The type(s) of tissue debrided included fibrin, slough and necrotic/eschar. Total Surface Area Debrided: 6 sq cm.     The ulcers were then

## 2018-12-10 ENCOUNTER — TELEPHONE (OUTPATIENT)
Dept: INTERNAL MEDICINE CLINIC | Age: 53
End: 2018-12-10

## 2018-12-11 ENCOUNTER — TELEPHONE (OUTPATIENT)
Dept: SURGERY | Age: 53
End: 2018-12-11

## 2018-12-11 PROBLEM — L89.893 PRESSURE ULCER OF LEFT LEG, STAGE 3 (HCC): Chronic | Status: ACTIVE | Noted: 2018-01-07

## 2018-12-11 LAB
EKG ATRIAL RATE: 109 BPM
EKG DIAGNOSIS: NORMAL
EKG P AXIS: 68 DEGREES
EKG P-R INTERVAL: 154 MS
EKG Q-T INTERVAL: 346 MS
EKG QRS DURATION: 82 MS
EKG QTC CALCULATION (BAZETT): 465 MS
EKG R AXIS: -9 DEGREES
EKG T AXIS: 83 DEGREES
EKG VENTRICULAR RATE: 109 BPM

## 2018-12-11 NOTE — PROGRESS NOTES
cauterize hypergranulation tissue on the left heel ulcer(s), after application of 4% lidocaine topical solution. This was tolerated well, with no pain or skin injury. Discharge plan:     Treatment in the wound care center today: Wound 11/28/18 #43, Right dorsal ankle, pressure, stage 2, onset 11/27/18, pressure-Dressing/Treatment: Other (Comment) (triad,adaptic,opticell ag,4x4,abd,kerlix,sinlge \"e\" tubi)  Wound 10/10/18 #41 Right plantar foot, DFU, Mccauley 2 onset 10/1/18, pressure  MERGED WITH #37 11/7/18-Dressing/Treatment: Other (Comment) (triad,adaptic,opticell ag,4x4,abd,kerlix,sinlge \"e\" tubi)  Wound 12/06/18 #44 Left lateral lower leg, Pressure, Stage 3 (onset 12/4/18) Pressure-Dressing/Treatment: Other (Comment) (polysporin,mepilex border )  Wound 09/26/18 # 38, Right Lower Lateral Leg, Venous, Partial Thickness, onset 9/23/18, gradually appeared-Dressing/Treatment: Other (Comment) (polysporin,mepilex border)  Wound 10/18/17 #28 Left heel, Pressure, Stage 4, onset 10/11/17, pressure-Dressing/Treatment: Other (Comment) (triad,opticell ag,4x4,abd,kerlix,single \"e\" tubigrip)  Wound 10/17/14 #8, right ischium, stage 4 PU, onset 7/15/14 (merged with #30), pressure-Dressing/Treatment: Other (Comment) (Zinc,AgAlginate,drawtex,4x4 fluff,ABD,tape)  Wound 09/18/14 #7, right hip, stage 4 PU, onset 8/7/14, pressure-Dressing/Treatment: Other (Comment) (Zinc,1/4\" Iodoform, mepilex border)  Wound 08/16/17 #27- Thoracic spine, dehisced surgical wound, full thickness, onset 8/16/2017, pressure-Dressing/Treatment: Other (Comment) (Zinc,Iodoform,AgAlginate,drawtex,4x4's,mepilex border)  Wound 10/25/17 #29, right heel & lateral foot, DFU, Mccauley 3 onset 10/25/17, pressure-Dressing/Treatment: Other (Comment) (triad,adaptic,ScubaTribe ag,4x4,abd,kerlix,sinlge \"e\" tubi). Adding DrawTex to the right ischium and T-spine to try to deal with level of exudate there. Keep up good work with glucose control and protein intake.    Keep up good work with minimal chair time, to offload ischium; be sure the left foot and leg aren't getting any previously unrecognized trauma or pressure during the day; wear Medline offloading boots at all times except shower and dressing changes. BKA scheduling per Dr. Sirena Rios. I repeated a CBC today, and his Hgb is more or less stable at 7.7, after his transfusion a few weeks ago. Will forward results to Dr. Farrukh Gonzalez. Home treatment: good handwashing before and after any dressing changes. Cleanse wound with saline or soap & water before dressing change. May use Vaseline (petrolatum), Aquaphor, Aveeno, CeraVe, Cetaphil, Eucerin, Lubriderm, etc for dry skin. Dressing type for home: Other: generally as above, TIW, except that the ischial dressing might need to be daily, unless we can manage the drainage enough not to allow skin maceration by having 48-72 hours between dressing changes. Specifically this week, I'd like them to skip next Tuesday on the ischium, so that I can see how it looks on Wed. Written discharge instructions given to patient. Follow up in 1 week.     Electronically signed by Nico Oconnor MD on 12/11/2018 at 11:59 AM.

## 2018-12-12 ENCOUNTER — HOSPITAL ENCOUNTER (OUTPATIENT)
Dept: WOUND CARE | Age: 53
Discharge: HOME | End: 2018-12-12
Payer: MEDICARE

## 2018-12-12 VITALS — TEMPERATURE: 98 F

## 2018-12-12 PROCEDURE — 97598 DBRDMT OPN WND ADDL 20CM/<: CPT | Performed by: INTERNAL MEDICINE

## 2018-12-12 PROCEDURE — 97597 DBRDMT OPN WND 1ST 20 CM/<: CPT

## 2018-12-12 PROCEDURE — 97597 DBRDMT OPN WND 1ST 20 CM/<: CPT | Performed by: INTERNAL MEDICINE

## 2018-12-12 PROCEDURE — 17250 CHEM CAUT OF GRANLTJ TISSUE: CPT | Performed by: INTERNAL MEDICINE

## 2018-12-12 PROCEDURE — 97598 DBRDMT OPN WND ADDL 20CM/<: CPT

## 2018-12-12 PROCEDURE — 17250 CHEM CAUT OF GRANLTJ TISSUE: CPT

## 2018-12-12 RX ORDER — LIDOCAINE 40 MG/G
CREAM TOPICAL ONCE
Status: DISCONTINUED | OUTPATIENT
Start: 2018-12-12 | End: 2018-12-13 | Stop reason: HOSPADM

## 2018-12-13 ENCOUNTER — OFFICE VISIT (OUTPATIENT)
Dept: INTERNAL MEDICINE CLINIC | Age: 53
End: 2018-12-13

## 2018-12-13 VITALS
HEIGHT: 74 IN | BODY MASS INDEX: 32.1 KG/M2 | RESPIRATION RATE: 18 BRPM | WEIGHT: 250.1 LBS | DIASTOLIC BLOOD PRESSURE: 80 MMHG | SYSTOLIC BLOOD PRESSURE: 142 MMHG | HEART RATE: 70 BPM

## 2018-12-13 DIAGNOSIS — E11.628 TYPE 2 DIABETES MELLITUS WITH OTHER SKIN COMPLICATION, WITH LONG-TERM CURRENT USE OF INSULIN (HCC): Chronic | ICD-10-CM

## 2018-12-13 DIAGNOSIS — D50.0 IRON DEFICIENCY ANEMIA DUE TO CHRONIC BLOOD LOSS: ICD-10-CM

## 2018-12-13 DIAGNOSIS — E87.6 GITELMAN SYNDROME: Chronic | ICD-10-CM

## 2018-12-13 DIAGNOSIS — E83.42 GITELMAN SYNDROME: Chronic | ICD-10-CM

## 2018-12-13 DIAGNOSIS — I25.5 ISCHEMIC CARDIOMYOPATHY: Chronic | ICD-10-CM

## 2018-12-13 DIAGNOSIS — Z79.4 TYPE 2 DIABETES MELLITUS WITH OTHER SKIN COMPLICATION, WITH LONG-TERM CURRENT USE OF INSULIN (HCC): Chronic | ICD-10-CM

## 2018-12-13 DIAGNOSIS — L89.893 PRESSURE ULCER OF LEFT LEG, STAGE 3 (HCC): Chronic | ICD-10-CM

## 2018-12-13 DIAGNOSIS — L89.214 PRESSURE ULCER OF RIGHT HIP, STAGE 4 (HCC): ICD-10-CM

## 2018-12-13 DIAGNOSIS — Z01.811 PRE-OP CHEST EXAM: Primary | ICD-10-CM

## 2018-12-13 DIAGNOSIS — I50.22 CHRONIC SYSTOLIC HEART FAILURE (HCC): Chronic | ICD-10-CM

## 2018-12-13 DIAGNOSIS — I25.10 CORONARY ARTERY DISEASE INVOLVING NATIVE CORONARY ARTERY OF NATIVE HEART WITHOUT ANGINA PECTORIS: Chronic | ICD-10-CM

## 2018-12-13 PROCEDURE — 99214 OFFICE O/P EST MOD 30 MIN: CPT | Performed by: INTERNAL MEDICINE
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
## 2018-12-14 ENCOUNTER — TELEPHONE (OUTPATIENT)
Dept: INTERNAL MEDICINE CLINIC | Age: 53
End: 2018-12-14

## 2018-12-17 PROBLEM — D50.9 IRON (FE) DEFICIENCY ANEMIA: Status: ACTIVE | Noted: 2018-12-17

## 2018-12-17 PROBLEM — I50.9 CHRONIC HEART FAILURE (HCC): Status: ACTIVE | Noted: 2018-12-17

## 2018-12-17 PROBLEM — D62 ACUTE POSTHEMORRHAGIC ANEMIA: Status: ACTIVE | Noted: 2018-12-17

## 2018-12-17 NOTE — PROGRESS NOTES
[facility] Progress Note    Felicita Sher     : 1965    DATE OF VISIT:  2018    Subjective:     Felicita Sher is a 48 y.o. male who has a non-healing surgical ulcer located on the back. Significant symptoms or pertinent wound history since last visit: feeling ok in general; fatigued, no F/C/D, no SOB or CP, no lightheadedness. Trying to minimize time in his chair; trying to change positions in bed as much as he can. Seeing his PCP tomorrow, still needs to schedule pre-op visit with cardiology. Additional ulcer(s) noted? yes. Right ischium, right hip, left lateral lower leg, BL heels, right plantar forefoot, smaller scattered ulcers on both lower legs. His current medication list consists of Apixaban, Insulin Lispro, aspirin, clopidogrel, cyclobenzaprine, docusate sodium, ferrous sulfate, insulin glargine, magnesium oxide, metFORMIN, metoclopramide, metoprolol succinate, metroNIDAZOLE (topical), nitroGLYCERIN, nystatin PRN, oxyCODONE, pantoprazole, promethazine, rosuvastatin, senna, torsemide, and traZODone. Allergies: Benadryl [diphenhydramine hcl]; Statins [statins]; Cephalexin; Morphine; Penicillins; and Sulfa antibiotics    Objective:     Vitals:    18 1037   Temp: 98 °F (36.7 °C)   TempSrc: Oral     AAOx3, overweight, fatigued, pale, NAD  Dopplerable distal pulses, feet warm, good cap refill  Moderate BL LE edema, no cellulitis, fluctuance, angitis, Candidiasis  No acute arthritis or bursitis  Chetna-ulcer skin: generally pink and indurated, some mild dressing related erythema at back, mild hyperkeratosis at heels, less pressure change to periwound of left heel.   Ulcer(s): back wound mostly red and granular, I think more hardware palpable since stopping NPWT, usual fibrinous exudate, serosanguinous, no pus, some tunneling along hardware; right hip stable small sinus tract; right ischium overall a little smaller, much of it granular but friable, potentially neurosurgery consult for the T-spine, or perhaps a second opinion (plastic surgical?) for the ischium. Home treatment: good handwashing before and after any dressing changes. Cleanse wound with saline or soap & water before dressing change. May use Vaseline (petrolatum), Aquaphor, Aveeno, CeraVe, Cetaphil, Eucerin, Lubriderm, etc for dry skin. Dressing type for home: Other: as above, with the ischium still needing to be daily until his level of drainage is such that it can be changed less often; the hip, back and lower extremities can hopefully be TIW, with the same caveat about drainage. Written discharge instructions given to patient. Follow up in 1 week.     Electronically signed by Kurtis Escobar MD on 12/17/2018 at 3:07 PM.

## 2018-12-18 ENCOUNTER — TELEPHONE (OUTPATIENT)
Dept: INTERNAL MEDICINE CLINIC | Age: 53
End: 2018-12-18

## 2018-12-18 DIAGNOSIS — E11.621 TYPE 2 DIABETES MELLITUS WITH FOOT ULCER, WITH LONG-TERM CURRENT USE OF INSULIN (HCC): ICD-10-CM

## 2018-12-18 DIAGNOSIS — L97.509 TYPE 2 DIABETES MELLITUS WITH FOOT ULCER, WITH LONG-TERM CURRENT USE OF INSULIN (HCC): ICD-10-CM

## 2018-12-18 DIAGNOSIS — Z79.4 TYPE 2 DIABETES MELLITUS WITH FOOT ULCER, WITH LONG-TERM CURRENT USE OF INSULIN (HCC): ICD-10-CM

## 2018-12-18 DIAGNOSIS — K21.9 GASTROESOPHAGEAL REFLUX DISEASE WITHOUT ESOPHAGITIS: ICD-10-CM

## 2018-12-18 RX ORDER — PANTOPRAZOLE SODIUM 40 MG/1
TABLET, DELAYED RELEASE ORAL
Qty: 90 TABLET | Refills: 0 | Status: SHIPPED | OUTPATIENT
Start: 2018-12-18 | End: 2019-04-03 | Stop reason: SDUPTHER

## 2018-12-19 ENCOUNTER — HOSPITAL ENCOUNTER (OUTPATIENT)
Dept: WOUND CARE | Age: 53
Discharge: HOME | End: 2018-12-19
Payer: MEDICARE

## 2018-12-19 ENCOUNTER — HOSPITAL ENCOUNTER (OUTPATIENT)
Dept: NURSING | Age: 53
Setting detail: INFUSION SERIES
Discharge: HOME | End: 2018-12-19
Payer: MEDICARE

## 2018-12-19 VITALS
RESPIRATION RATE: 18 BRPM | SYSTOLIC BLOOD PRESSURE: 87 MMHG | HEIGHT: 74 IN | HEART RATE: 85 BPM | TEMPERATURE: 97.1 F | DIASTOLIC BLOOD PRESSURE: 53 MMHG | BODY MASS INDEX: 32.11 KG/M2

## 2018-12-19 VITALS
HEIGHT: 74 IN | BODY MASS INDEX: 32.08 KG/M2 | HEART RATE: 88 BPM | SYSTOLIC BLOOD PRESSURE: 120 MMHG | WEIGHT: 250 LBS | DIASTOLIC BLOOD PRESSURE: 61 MMHG | RESPIRATION RATE: 18 BRPM

## 2018-12-19 DIAGNOSIS — D50.9 IRON DEFICIENCY ANEMIA, UNSPECIFIED IRON DEFICIENCY ANEMIA TYPE: ICD-10-CM

## 2018-12-19 DIAGNOSIS — I50.9 CHRONIC HEART FAILURE, UNSPECIFIED HEART FAILURE TYPE (HCC): ICD-10-CM

## 2018-12-19 DIAGNOSIS — D62 ACUTE POSTHEMORRHAGIC ANEMIA: ICD-10-CM

## 2018-12-19 DIAGNOSIS — I25.5 ISCHEMIC CARDIOMYOPATHY: ICD-10-CM

## 2018-12-19 LAB
BASOPHILS ABSOLUTE: 0.1 K/UL (ref 0–0.2)
BASOPHILS RELATIVE PERCENT: 1.4 %
EOSINOPHILS ABSOLUTE: 0.2 K/UL (ref 0–0.6)
EOSINOPHILS RELATIVE PERCENT: 2.3 %
HCT VFR BLD CALC: 26.8 % (ref 40.5–52.5)
HEMOGLOBIN: 8 G/DL (ref 13.5–17.5)
LYMPHOCYTES ABSOLUTE: 1 K/UL (ref 1–5.1)
LYMPHOCYTES RELATIVE PERCENT: 11.2 %
MCH RBC QN AUTO: 22.2 PG (ref 26–34)
MCHC RBC AUTO-ENTMCNC: 30 G/DL (ref 31–36)
MCV RBC AUTO: 73.9 FL (ref 80–100)
MONOCYTES ABSOLUTE: 0.7 K/UL (ref 0–1.3)
MONOCYTES RELATIVE PERCENT: 8.1 %
NEUTROPHILS ABSOLUTE: 7.1 K/UL (ref 1.7–7.7)
NEUTROPHILS RELATIVE PERCENT: 77 %
PDW BLD-RTO: 22.9 % (ref 12.4–15.4)
PLATELET # BLD: 483 K/UL (ref 135–450)
PMV BLD AUTO: 7.8 FL (ref 5–10.5)
RBC # BLD: 3.63 M/UL (ref 4.2–5.9)
WBC # BLD: 9.2 K/UL (ref 4–11)

## 2018-12-19 PROCEDURE — 11043 DBRDMT MUSC&/FSCA 1ST 20/<: CPT

## 2018-12-19 PROCEDURE — 2580000003 HC RX 258: Performed by: INTERNAL MEDICINE

## 2018-12-19 PROCEDURE — 36415 COLL VENOUS BLD VENIPUNCTURE: CPT

## 2018-12-19 PROCEDURE — 97598 DBRDMT OPN WND ADDL 20CM/<: CPT | Performed by: INTERNAL MEDICINE

## 2018-12-19 PROCEDURE — 17250 CHEM CAUT OF GRANLTJ TISSUE: CPT | Performed by: INTERNAL MEDICINE

## 2018-12-19 PROCEDURE — 11043 DBRDMT MUSC&/FSCA 1ST 20/<: CPT | Performed by: INTERNAL MEDICINE

## 2018-12-19 PROCEDURE — 97597 DBRDMT OPN WND 1ST 20 CM/<: CPT | Performed by: INTERNAL MEDICINE

## 2018-12-19 PROCEDURE — 17250 CHEM CAUT OF GRANLTJ TISSUE: CPT

## 2018-12-19 PROCEDURE — 96365 THER/PROPH/DIAG IV INF INIT: CPT

## 2018-12-19 PROCEDURE — 97597 DBRDMT OPN WND 1ST 20 CM/<: CPT

## 2018-12-19 PROCEDURE — 97598 DBRDMT OPN WND ADDL 20CM/<: CPT

## 2018-12-19 PROCEDURE — 85025 COMPLETE CBC W/AUTO DIFF WBC: CPT

## 2018-12-19 PROCEDURE — 6360000002 HC RX W HCPCS: Performed by: INTERNAL MEDICINE

## 2018-12-19 RX ORDER — SODIUM CHLORIDE 9 MG/ML
INJECTION, SOLUTION INTRAVENOUS ONCE
Status: CANCELLED | OUTPATIENT
Start: 2018-12-19 | End: 2018-12-19

## 2018-12-19 RX ORDER — SODIUM CHLORIDE 0.9 % (FLUSH) 0.9 %
10 SYRINGE (ML) INJECTION ONCE
Status: COMPLETED | OUTPATIENT
Start: 2018-12-19 | End: 2018-12-19

## 2018-12-19 RX ORDER — SODIUM CHLORIDE 0.9 % (FLUSH) 0.9 %
10 SYRINGE (ML) INJECTION ONCE
Status: CANCELLED | OUTPATIENT
Start: 2018-12-19 | End: 2018-12-19

## 2018-12-19 RX ORDER — LIDOCAINE 40 MG/G
CREAM TOPICAL ONCE
Status: DISCONTINUED | OUTPATIENT
Start: 2018-12-19 | End: 2018-12-20 | Stop reason: HOSPADM

## 2018-12-19 RX ORDER — SODIUM CHLORIDE 9 MG/ML
INJECTION, SOLUTION INTRAVENOUS ONCE
Status: COMPLETED | OUTPATIENT
Start: 2018-12-19 | End: 2018-12-19

## 2018-12-19 RX ADMIN — Medication 10 ML: at 13:15

## 2018-12-19 RX ADMIN — SODIUM CHLORIDE: 9 INJECTION, SOLUTION INTRAVENOUS at 13:17

## 2018-12-19 RX ADMIN — FERRIC CARBOXYMALTOSE INJECTION 750 MG: 50 INJECTION, SOLUTION INTRAVENOUS at 13:18

## 2018-12-19 ASSESSMENT — PAIN DESCRIPTION - LOCATION: LOCATION: NECK;SHOULDER

## 2018-12-19 ASSESSMENT — PAIN DESCRIPTION - PAIN TYPE: TYPE: CHRONIC PAIN

## 2018-12-19 ASSESSMENT — PAIN SCALES - GENERAL
PAINLEVEL_OUTOF10: 0
PAINLEVEL_OUTOF10: 3

## 2018-12-19 ASSESSMENT — PAIN DESCRIPTION - ONSET: ONSET: ON-GOING

## 2018-12-19 ASSESSMENT — PAIN DESCRIPTION - ORIENTATION: ORIENTATION: RIGHT

## 2018-12-19 ASSESSMENT — PAIN DESCRIPTION - DESCRIPTORS: DESCRIPTORS: BURNING;STABBING

## 2018-12-19 ASSESSMENT — PAIN DESCRIPTION - FREQUENCY: FREQUENCY: CONTINUOUS

## 2018-12-19 ASSESSMENT — PAIN DESCRIPTION - PROGRESSION: CLINICAL_PROGRESSION: NOT CHANGED

## 2018-12-19 NOTE — PROGRESS NOTES
Pt here via motorized w/c from wound care for an infusion of iron  Pt reports that he has had iron before without any side effects  Pt informed that this may be a different type of iron that requires a 30 min monitoring time after the infusion is completed  Pt reports that is ok Pt denies the need for education on the iron at present time but is aware it will be on his print out when he is discharged  Pt without c/o's at present time IV # 24 started on 1st attempt to L Hand per myself without difficulty  Pt essie well  Injectafer 750 mg IVPB started  IV site unremarkable Pt essie well  Will monitor

## 2018-12-19 NOTE — PROGRESS NOTES
No changes noted in pt condition  Pt without c/o's  Lungs CTA  resp easy and even  IV removed  Cath tip intact  Pressure then pressure dressing was applied and secured with a coban dressing  IV site unremarkable  Discharge instructions reviewed with pt  Understanding verbalized and copy was given  Pt discharged via his motorized w/c in stable condition with his health care aide

## 2018-12-19 NOTE — PROGRESS NOTES
Injectafer infused without any signs of adverse reactions  Pt essie well  No c/o's voiced  Pt reports that he is feeling fine  Will leave INT in place during 30 mins monitoring period  Pt is agreement

## 2018-12-26 ENCOUNTER — HOSPITAL ENCOUNTER (OUTPATIENT)
Dept: WOUND CARE | Age: 53
Discharge: HOME | End: 2018-12-26
Payer: MEDICARE

## 2018-12-26 ENCOUNTER — HOSPITAL ENCOUNTER (OUTPATIENT)
Dept: NURSING | Age: 53
Setting detail: INFUSION SERIES
Discharge: HOME | End: 2018-12-26
Payer: MEDICARE

## 2018-12-26 VITALS
WEIGHT: 247 LBS | BODY MASS INDEX: 31.7 KG/M2 | SYSTOLIC BLOOD PRESSURE: 111 MMHG | HEART RATE: 79 BPM | RESPIRATION RATE: 18 BRPM | DIASTOLIC BLOOD PRESSURE: 67 MMHG | HEIGHT: 74 IN

## 2018-12-26 VITALS
TEMPERATURE: 97 F | WEIGHT: 247.4 LBS | HEART RATE: 87 BPM | HEIGHT: 74 IN | SYSTOLIC BLOOD PRESSURE: 99 MMHG | BODY MASS INDEX: 31.75 KG/M2 | RESPIRATION RATE: 18 BRPM | DIASTOLIC BLOOD PRESSURE: 66 MMHG

## 2018-12-26 DIAGNOSIS — D62 ACUTE POSTHEMORRHAGIC ANEMIA: ICD-10-CM

## 2018-12-26 DIAGNOSIS — D50.9 IRON DEFICIENCY ANEMIA, UNSPECIFIED IRON DEFICIENCY ANEMIA TYPE: ICD-10-CM

## 2018-12-26 DIAGNOSIS — I50.9 CHRONIC HEART FAILURE, UNSPECIFIED HEART FAILURE TYPE (HCC): ICD-10-CM

## 2018-12-26 DIAGNOSIS — I25.5 ISCHEMIC CARDIOMYOPATHY: ICD-10-CM

## 2018-12-26 PROCEDURE — 11043 DBRDMT MUSC&/FSCA 1ST 20/<: CPT

## 2018-12-26 PROCEDURE — 97598 DBRDMT OPN WND ADDL 20CM/<: CPT

## 2018-12-26 PROCEDURE — 6360000002 HC RX W HCPCS: Performed by: INTERNAL MEDICINE

## 2018-12-26 PROCEDURE — 97598 DBRDMT OPN WND ADDL 20CM/<: CPT | Performed by: INTERNAL MEDICINE

## 2018-12-26 PROCEDURE — 11046 DBRDMT MUSC&/FSCA EA ADDL: CPT

## 2018-12-26 PROCEDURE — 97597 DBRDMT OPN WND 1ST 20 CM/<: CPT

## 2018-12-26 PROCEDURE — 97597 DBRDMT OPN WND 1ST 20 CM/<: CPT | Performed by: INTERNAL MEDICINE

## 2018-12-26 PROCEDURE — 17250 CHEM CAUT OF GRANLTJ TISSUE: CPT

## 2018-12-26 PROCEDURE — 11046 DBRDMT MUSC&/FSCA EA ADDL: CPT | Performed by: INTERNAL MEDICINE

## 2018-12-26 PROCEDURE — 2580000003 HC RX 258: Performed by: INTERNAL MEDICINE

## 2018-12-26 PROCEDURE — 11043 DBRDMT MUSC&/FSCA 1ST 20/<: CPT | Performed by: INTERNAL MEDICINE

## 2018-12-26 PROCEDURE — 96365 THER/PROPH/DIAG IV INF INIT: CPT

## 2018-12-26 RX ORDER — SODIUM CHLORIDE 0.9 % (FLUSH) 0.9 %
10 SYRINGE (ML) INJECTION ONCE
Status: CANCELLED | OUTPATIENT
Start: 2018-12-26 | End: 2018-12-26

## 2018-12-26 RX ORDER — SODIUM CHLORIDE 0.9 % (FLUSH) 0.9 %
10 SYRINGE (ML) INJECTION ONCE
Status: COMPLETED | OUTPATIENT
Start: 2018-12-26 | End: 2018-12-26

## 2018-12-26 RX ORDER — LIDOCAINE 40 MG/G
CREAM TOPICAL PRN
Status: DISCONTINUED | OUTPATIENT
Start: 2018-12-26 | End: 2018-12-27 | Stop reason: HOSPADM

## 2018-12-26 RX ORDER — SODIUM CHLORIDE 9 MG/ML
INJECTION, SOLUTION INTRAVENOUS ONCE
Status: COMPLETED | OUTPATIENT
Start: 2018-12-26 | End: 2018-12-26

## 2018-12-26 RX ORDER — SODIUM CHLORIDE 9 MG/ML
INJECTION, SOLUTION INTRAVENOUS ONCE
Status: CANCELLED | OUTPATIENT
Start: 2018-12-26 | End: 2018-12-26

## 2018-12-26 RX ADMIN — SODIUM CHLORIDE: 9 INJECTION, SOLUTION INTRAVENOUS at 12:52

## 2018-12-26 RX ADMIN — FERRIC CARBOXYMALTOSE INJECTION 750 MG: 50 INJECTION, SOLUTION INTRAVENOUS at 12:52

## 2018-12-26 RX ADMIN — Medication 10 ML: at 12:51

## 2018-12-26 ASSESSMENT — PAIN DESCRIPTION - DESCRIPTORS
DESCRIPTORS: ACHING;BURNING;STABBING
DESCRIPTORS: STABBING;BURNING

## 2018-12-26 ASSESSMENT — PAIN DESCRIPTION - FREQUENCY
FREQUENCY: CONTINUOUS
FREQUENCY: CONTINUOUS

## 2018-12-26 ASSESSMENT — ACTIVITIES OF DAILY LIVING (ADL): EFFECT OF PAIN ON DAILY ACTIVITIES: TURNING

## 2018-12-26 ASSESSMENT — PAIN DESCRIPTION - ORIENTATION
ORIENTATION: LEFT
ORIENTATION: RIGHT

## 2018-12-26 ASSESSMENT — PAIN DESCRIPTION - LOCATION
LOCATION: NECK;SHOULDER
LOCATION: SHOULDER;NECK

## 2018-12-26 ASSESSMENT — PAIN DESCRIPTION - PAIN TYPE
TYPE: CHRONIC PAIN
TYPE: CHRONIC PAIN

## 2018-12-26 ASSESSMENT — PAIN DESCRIPTION - ONSET
ONSET: ON-GOING
ONSET: ON-GOING

## 2018-12-26 ASSESSMENT — PAIN SCALES - GENERAL
PAINLEVEL_OUTOF10: 3
PAINLEVEL_OUTOF10: 3

## 2018-12-26 ASSESSMENT — PAIN DESCRIPTION - PROGRESSION: CLINICAL_PROGRESSION: NOT CHANGED

## 2018-12-26 NOTE — PROGRESS NOTES
Injectafer infused without any signs of adverse reactions and tubing was flushed with NS  Pt essie well  Pt reports that he is feeling fine except being cold  Blankets applied   Will leave INT in place during 30 mins monitoring period  Pt is agreement  Will monitor

## 2018-12-26 NOTE — PLAN OF CARE
Problem: Wound:  Goal: Will show signs of wound healing; wound closure and no evidence of infection  Will show signs of wound healing; wound closure and no evidence of infection   Outcome: Ongoing  Left heel wound noted to have pressure injury to alin-wound, photo reference taken, felt padding applied per MD to attempt to alleviate pressure to this area. Left heel wound debrided per MD, will cont. With current wound care regime, football for off-loading & pt. Also wearing [employer] off-loading boots bilaterally. Right plantar foot wound stable, debrided per MD, Will cont. With current wound care regime. Pt. Awaiting cardiac clearance next week, then plan to schedule Rt. BKA shortly after. Right ischial wound stable, debridement per MD, will cont. With current wound care regime. Reinforced importance of frequent repositioning from side to side & avoiding shearing in bed. Also reinforced importance of not spending much time seated in his power chair. Back wound stable, no debridement, will cont. With current wound care regime. F/u in [facility] in 1 week as ordered. Discharge instructions reviewed with patient, all questions answered, copy given to patient. Dressings were applied to all wounds per M.D. Instructions at this visit.

## 2018-12-26 NOTE — PROGRESS NOTES
Pt here via motorized w/c from wound care for the second dose of injectafer  Pt reports that he didn't have any reactions after the first dose  Pt does report neck and left shoulder pain today rated a 3 out of 10  Pt denies any questions about the infusion today and is aware of 30 mins wait time after the infusion is completed  IV # 24 started on 1st attempt to L Hand per MARCOS Gutiérrez RN  without difficulty  Pt essie well  Injectafer 750 mg IVPB started  IV site unremarkable Pt essie well  Will monitor

## 2018-12-27 PROBLEM — I50.9 CHRONIC HEART FAILURE (HCC): Status: RESOLVED | Noted: 2018-12-17 | Resolved: 2018-12-27

## 2018-12-27 PROBLEM — D62 ACUTE POSTHEMORRHAGIC ANEMIA: Status: RESOLVED | Noted: 2018-12-17 | Resolved: 2018-12-27

## 2018-12-27 NOTE — PROGRESS NOTES
#27- Thoracic spine, dehisced surgical wound, full thickness, onset 8/16/2017, pressure-Dressing/Treatment: Alginate with Ag, Silicone dressing (drawtex)  Wound 11/28/18 #43, Right dorsal ankle, pressure, stage 2, onset 11/27/18, pressure-Dressing/Treatment: Antibacterial Ointment, Silver dressing  Wound 10/10/18 #41 Right plantar foot, DFU, Mccauley 2 onset 10/1/18, pressure  MERGED WITH #37 11/7/18-Dressing/Treatment: Other (Comment) (triad,adaptic,silver Ag,4x4,ABD,kerlix)  Wound 10/25/17 #29, right heel & lateral foot, DFU, Mccauley 3 onset 10/25/17, pressure-Dressing/Treatment: Other (Comment) (triad,adaptic,silver Ag,4x4,ABD,kerlix)  Wound 09/26/18 # 38, Right Lower Lateral Leg, Venous, Partial Thickness, onset 9/23/18, gradually appeared-Dressing/Treatment: Antibacterial Ointment, Silicone dressing. Await cardiology eval, iron infusions, later a FU CBC, decision on pre-op transfusion. Continue good work on protein intake and glucose control. We're purposefully backing off LE compression for now because of the appearance of those newer lower leg ulcers; will step up RLE compression in the days before surgery, to maximize chances of quick healing of his BKA wound. Continue maximal effort with offloading (minimal chair time, frequent repositioning in bed, wearing [employer] offloading boots at all times). Wheelchair ankle straps to be delivered and attached finally, which should help decrease external lower leg rotation when he IS in his chair. Home treatment: good handwashing before and after any dressing changes. Cleanse wound with saline or soap & water before dressing change. May use Vaseline (petrolatum), Aquaphor, Aveeno, CeraVe, Cetaphil, Eucerin, Lubriderm, etc for dry skin.      Dressing type for home: Other: generally as above, TIW for the right hip and left leg & foot, daily for the right foot and ischium until the level of drainage drops to the point where less frequent dressing changes are possible. Written discharge instructions given to patient. Follow up in 1 week.     Electronically signed by Nolberto Taylor MD on 12/27/2018 at [address] PM.

## 2019-01-01 NOTE — PROGRESS NOTES
[facility] Progress Note    Juani Vargas     : 1965    DATE OF VISIT:  2018    Subjective:     Juani Vargas is a 48 y.o. male who has a non-healing surgical ulcer located on the back. Significant symptoms or pertinent wound history since last visit: doing ok overall, sees cardiology next week. Getting his second IV iron infusion today. No F/C/D. Doing his best to stay offloaded, and has had progress with the ischial ulcer. Additional ulcer(s) noted? yes. Right ischium and hip, BL heels, left leg, right forefoot, and a couple of small scattered ulcers on lower legs. His current medication list consists of Apixaban, Insulin Lispro, aspirin, clopidogrel, cyclobenzaprine, docusate sodium, ferric carboxymaltose, ferrous sulfate, insulin glargine, magnesium oxide, metFORMIN, metoclopramide, metoprolol succinate, metroNIDAZOLE, nitroGLYCERIN, nystatin, oxyCODONE, pantoprazole, promethazine, rosuvastatin, senna, torsemide, and traZODone. Allergies: Benadryl [diphenhydramine hcl]; Statins [statins]; Cephalexin; Morphine; Penicillins; and Sulfa antibiotics    Objective:     Vitals:    18 1039   BP: 99/66   Pulse: 87   Resp: 18   Temp: 97 °F (36.1 °C)   TempSrc: Oral   Weight: 247 lb 6.4 oz (112.2 kg)   Height: 6' 2\" (1.88 m)     AAOx3, overweight, fatigued, pale, NAD  Intact distal pulses, feet warm, good cap refill  Mod LE edema; no cellulitis, angitis, fluctuance, or definite Candidiasis  Some dermatitis around right ischial ulcer, either from dressing or drainage  Chetna-ulcer skin: generally pink and indurated, dermatitis as mentioned at ischium, and a distinct triangular area of purplish pressure change at the most dependent part of his left heel (when supine).   Ulcer(s): T-spine mostly red and granular, peripherally hypergranular, friable, stable hardware exposure; right hip stable small sinus tract; right ischium still a large amount of fibrin and biofilm on the surface, but I think more granular, less SQ fibronecrosis, no expsoed bone, still a fairly deep tunnel toward the hip, and some new skin breakdown down toward the scrotum (he says because of a leaking urinary catheter and moisture damage); right heel mostly granular, a bit of exposed bone and hypergranulation, clot; right forefoot more necrotic this week (fat and tendon-muscle, with some malodor, cloudy drainage, still some pocketing and tunneling); left heel mostly granular, fibrin, but skin-fat-Achilles necrosis at the pressure area; left leg just starting to get more granular, still mostly fibrotic, overlying fibrinous debris. Photos also saved in electronic chart.     Today's wound measurements:  Wound 10/17/14 #8, right ischium, stage 4 PU, onset 7/15/14 (merged with #30), pressure-Wound Length (cm): 14 cm  Wound 10/18/17 #28 Left heel, Pressure, Stage 4, onset 10/11/17, pressure-Wound Length (cm): 5.9 cm  Wound 12/06/18 #44 Left lateral lower leg, Pressure, Stage 3 (onset 12/4/18) Pressure-Wound Length (cm): 6 cm  Wound 10/25/17 #29, right heel & lateral foot, DFU, Mccauley 3 onset 10/25/17, pressure-Wound Length (cm): 5.5 cm  Wound 09/26/18 # 38, Right Lower Lateral Leg, Venous, Partial Thickness, onset 9/23/18, gradually appeared-Wound Length (cm): 0.6 cm  Wound 10/10/18 #41 Right plantar foot, DFU, Mccauley 2 onset 10/1/18, pressure  MERGED WITH #37 11/7/18-Wound Length (cm): 3 cm  Wound 11/28/18 #43, Right dorsal ankle, pressure, stage 2, onset 11/27/18, pressure-Wound Length (cm): 3 cm  Wound 08/16/17 #27- Thoracic spine, dehisced surgical wound, full thickness, onset 8/16/2017, pressure-Wound Length (cm): 7 cm  Wound 09/18/14 #7, right hip, stage 4 PU, onset 8/7/14, pressure-Wound Length (cm): 0.3 cm    Wound 10/17/14 #8, right ischium, stage 4 PU, onset 7/15/14 (merged with #30), pressure-Wound Width (cm): 6 cm  Wound 10/18/17 #28 Left heel, Pressure, Stage 4, onset 10/11/17, pressure-Wound Width and right ischial ulcer(s) down through and including the removal of dermis. The type(s) of tissue debrided included fibrin, biofilm and exudate. Total Surface Area Debrided: approx 70 sq cm. Using a curette, #15 blade scalpel and forceps, I sharply debrided the left heel and right forefoot ulcer(s) down through and including the removal of muscle/fascia. The type(s) of tissue debrided included fibrin, biofilm, slough and necrotic/eschar. Total Surface Area Debrided: approx 25 sq cm. The ulcers were then irrigated with normal saline solution. The procedure was completed with a moderate amount of bleeding, and hemostasis was by pressure and by silver nitrate stick. The patient tolerated the procedure well, with no significant complications. The patient's level of pain during and after the procedure was monitored. Post-debridement measurements, if different from pre-debridement, are in the flowsheet as well.  _____________    To encourage better epithelial cell coverage, I did use AgNO3 to chemically cauterize hypergranulation tissue on the T-spine and right heel ulcer(s), after application of 4% lidocaine topical solution. This was tolerated well, with no pain or skin injury. Discharge plan:     Treatment in the wound care center today: Wound 10/17/14 #8, right ischium, stage 4 PU, onset 7/15/14 (merged with #30), pressure-Dressing/Treatment: Alginate with Ag, Moisture barrier (drawtex, mepilex border)  Wound 10/18/17 #28 Left heel, Pressure, Stage 4, onset 10/11/17, pressure-Dressing/Treatment: Other (Comment), ABD, 4x4 (triad,adaptic,OpticelAg,castpadx3,kerlix,coban,singleEtubi) -- I also placed an additional felt pad to try to better offload the heel.   Wound 12/06/18 #44 Left lateral lower leg, Pressure, Stage 3 (onset 12/4/18) Pressure-Dressing/Treatment: Other (Comment) (yoni,4x4,ABD,kerlix,singleEtubi)  Wound 10/25/17 #29, right heel & lateral foot, DFU, Mccauley 3 onset 10/25/17,

## 2019-01-02 ENCOUNTER — HOSPITAL ENCOUNTER (OUTPATIENT)
Dept: WOUND CARE | Age: 54
Discharge: HOME | End: 2019-01-02
Payer: MEDICARE

## 2019-01-02 VITALS
SYSTOLIC BLOOD PRESSURE: 99 MMHG | HEART RATE: 95 BPM | TEMPERATURE: 97.9 F | RESPIRATION RATE: 20 BRPM | DIASTOLIC BLOOD PRESSURE: 64 MMHG

## 2019-01-02 PROCEDURE — 97597 DBRDMT OPN WND 1ST 20 CM/<: CPT

## 2019-01-02 PROCEDURE — 29445 APPL RIGID TOT CNTC LEG CAST: CPT | Performed by: INTERNAL MEDICINE

## 2019-01-02 PROCEDURE — 97597 DBRDMT OPN WND 1ST 20 CM/<: CPT | Performed by: INTERNAL MEDICINE

## 2019-01-02 PROCEDURE — 97598 DBRDMT OPN WND ADDL 20CM/<: CPT

## 2019-01-02 PROCEDURE — 97598 DBRDMT OPN WND ADDL 20CM/<: CPT | Performed by: INTERNAL MEDICINE

## 2019-01-02 PROCEDURE — 17250 CHEM CAUT OF GRANLTJ TISSUE: CPT

## 2019-01-02 PROCEDURE — 17250 CHEM CAUT OF GRANLTJ TISSUE: CPT | Performed by: INTERNAL MEDICINE

## 2019-01-02 PROCEDURE — 29445 APPL RIGID TOT CNTC LEG CAST: CPT

## 2019-01-02 RX ORDER — LIDOCAINE 40 MG/G
CREAM TOPICAL ONCE
Status: DISCONTINUED | OUTPATIENT
Start: 2019-01-02 | End: 2019-01-03 | Stop reason: HOSPADM

## 2019-01-02 RX ORDER — POLYETHYLENE GLYCOL 3350 17 G/17G
17 POWDER, FOR SOLUTION ORAL PRN
COMMUNITY
End: 2020-08-26 | Stop reason: ALTCHOICE

## 2019-01-03 ENCOUNTER — TELEPHONE (OUTPATIENT)
Dept: SURGERY | Age: 54
End: 2019-01-03

## 2019-01-07 ENCOUNTER — TELEPHONE (OUTPATIENT)
Dept: INTERNAL MEDICINE CLINIC | Age: 54
End: 2019-01-07

## 2019-01-08 ENCOUNTER — TELEPHONE (OUTPATIENT)
Dept: SURGERY | Age: 54
End: 2019-01-08

## 2019-01-08 ENCOUNTER — TELEPHONE (OUTPATIENT)
Dept: INTERNAL MEDICINE CLINIC | Age: 54
End: 2019-01-08

## 2019-01-08 PROBLEM — D50.9 IRON (FE) DEFICIENCY ANEMIA: Status: RESOLVED | Noted: 2018-12-17 | Resolved: 2019-01-08

## 2019-01-09 ENCOUNTER — HOSPITAL ENCOUNTER (OUTPATIENT)
Dept: WOUND CARE | Age: 54
Discharge: HOME | End: 2019-01-09
Payer: MEDICARE

## 2019-01-09 VITALS
HEIGHT: 74 IN | RESPIRATION RATE: 18 BRPM | DIASTOLIC BLOOD PRESSURE: 67 MMHG | SYSTOLIC BLOOD PRESSURE: 111 MMHG | TEMPERATURE: 96.8 F | WEIGHT: 246.2 LBS | BODY MASS INDEX: 31.6 KG/M2 | HEART RATE: 91 BPM

## 2019-01-09 PROCEDURE — 17250 CHEM CAUT OF GRANLTJ TISSUE: CPT | Performed by: INTERNAL MEDICINE

## 2019-01-09 PROCEDURE — 97598 DBRDMT OPN WND ADDL 20CM/<: CPT

## 2019-01-09 PROCEDURE — 29445 APPL RIGID TOT CNTC LEG CAST: CPT

## 2019-01-09 PROCEDURE — 97597 DBRDMT OPN WND 1ST 20 CM/<: CPT | Performed by: INTERNAL MEDICINE

## 2019-01-09 PROCEDURE — 11043 DBRDMT MUSC&/FSCA 1ST 20/<: CPT | Performed by: INTERNAL MEDICINE

## 2019-01-09 PROCEDURE — 97597 DBRDMT OPN WND 1ST 20 CM/<: CPT

## 2019-01-09 PROCEDURE — 11043 DBRDMT MUSC&/FSCA 1ST 20/<: CPT

## 2019-01-09 PROCEDURE — 97598 DBRDMT OPN WND ADDL 20CM/<: CPT | Performed by: INTERNAL MEDICINE

## 2019-01-09 PROCEDURE — 17250 CHEM CAUT OF GRANLTJ TISSUE: CPT

## 2019-01-09 RX ORDER — LIDOCAINE 40 MG/G
CREAM TOPICAL ONCE
Status: DISCONTINUED | OUTPATIENT
Start: 2019-01-09 | End: 2019-01-10 | Stop reason: HOSPADM

## 2019-01-15 ENCOUNTER — ANESTHESIA (OUTPATIENT)
Dept: OPERATING ROOM | Age: 54
DRG: 616 | End: 2019-01-15
Payer: MEDICARE

## 2019-01-15 ENCOUNTER — ANESTHESIA EVENT (OUTPATIENT)
Dept: OPERATING ROOM | Age: 54
DRG: 616 | End: 2019-01-15
Payer: MEDICARE

## 2019-01-15 ENCOUNTER — HOSPITAL ENCOUNTER (INPATIENT)
Age: 54
LOS: 3 days | Discharge: HOME HEALTH | DRG: 616 | End: 2019-01-18
Attending: SURGERY | Admitting: SURGERY
Payer: MEDICARE

## 2019-01-15 VITALS — OXYGEN SATURATION: 100 % | SYSTOLIC BLOOD PRESSURE: 101 MMHG | DIASTOLIC BLOOD PRESSURE: 67 MMHG

## 2019-01-15 PROBLEM — L97.509 DIABETIC FOOT ULCER WITH OSTEOMYELITIS (HCC): Status: ACTIVE | Noted: 2019-01-15

## 2019-01-15 PROBLEM — E11.621 DIABETIC FOOT ULCER WITH OSTEOMYELITIS (HCC): Status: ACTIVE | Noted: 2019-01-15

## 2019-01-15 PROBLEM — M86.9 DIABETIC FOOT ULCER WITH OSTEOMYELITIS (HCC): Status: ACTIVE | Noted: 2019-01-15

## 2019-01-15 PROBLEM — E11.69 DIABETIC FOOT ULCER WITH OSTEOMYELITIS (HCC): Status: ACTIVE | Noted: 2019-01-15

## 2019-01-15 LAB
ABO/RH: NORMAL
ANION GAP SERPL CALCULATED.3IONS-SCNC: 15 MMOL/L (ref 3–16)
ANTIBODY SCREEN: NORMAL
BUN BLDV-MCNC: 32 MG/DL (ref 7–20)
CALCIUM SERPL-MCNC: 8.5 MG/DL (ref 8.3–10.6)
CHLORIDE BLD-SCNC: 95 MMOL/L (ref 99–110)
CO2: 26 MMOL/L (ref 21–32)
CREAT SERPL-MCNC: 0.8 MG/DL (ref 0.9–1.3)
GFR AFRICAN AMERICAN: >60
GFR NON-AFRICAN AMERICAN: >60
GLUCOSE BLD-MCNC: 103 MG/DL (ref 70–99)
GLUCOSE BLD-MCNC: 103 MG/DL (ref 70–99)
GLUCOSE BLD-MCNC: 115 MG/DL (ref 70–99)
GLUCOSE BLD-MCNC: 56 MG/DL (ref 70–99)
GLUCOSE BLD-MCNC: 73 MG/DL (ref 70–99)
GLUCOSE BLD-MCNC: 86 MG/DL (ref 70–99)
HCT VFR BLD CALC: 30.4 % (ref 40.5–52.5)
HEMOGLOBIN: 9.4 G/DL (ref 13.5–17.5)
MCH RBC QN AUTO: 25.1 PG (ref 26–34)
MCHC RBC AUTO-ENTMCNC: 30.9 G/DL (ref 31–36)
MCV RBC AUTO: 81.4 FL (ref 80–100)
PDW BLD-RTO: 24.5 % (ref 12.4–15.4)
PERFORMED ON: ABNORMAL
PERFORMED ON: NORMAL
PERFORMED ON: NORMAL
PLATELET # BLD: 428 K/UL (ref 135–450)
PMV BLD AUTO: 8.1 FL (ref 5–10.5)
POTASSIUM SERPL-SCNC: 3.5 MMOL/L (ref 3.5–5.1)
RBC # BLD: 3.74 M/UL (ref 4.2–5.9)
SODIUM BLD-SCNC: 136 MMOL/L (ref 136–145)
WBC # BLD: 10.4 K/UL (ref 4–11)

## 2019-01-15 PROCEDURE — 6370000000 HC RX 637 (ALT 250 FOR IP): Performed by: SURGERY

## 2019-01-15 PROCEDURE — L1830 KO IMMOB CANVAS LONG PRE OTS: HCPCS | Performed by: SURGERY

## 2019-01-15 PROCEDURE — 80048 BASIC METABOLIC PNL TOTAL CA: CPT

## 2019-01-15 PROCEDURE — 1200000000 HC SEMI PRIVATE

## 2019-01-15 PROCEDURE — 36415 COLL VENOUS BLD VENIPUNCTURE: CPT

## 2019-01-15 PROCEDURE — 3600000004 HC SURGERY LEVEL 4 BASE: Performed by: SURGERY

## 2019-01-15 PROCEDURE — 7100000000 HC PACU RECOVERY - FIRST 15 MIN: Performed by: SURGERY

## 2019-01-15 PROCEDURE — 27880 AMPUTATION OF LOWER LEG: CPT | Performed by: SURGERY

## 2019-01-15 PROCEDURE — 2709999900 HC NON-CHARGEABLE SUPPLY: Performed by: SURGERY

## 2019-01-15 PROCEDURE — 6360000002 HC RX W HCPCS: Performed by: NURSE ANESTHETIST, CERTIFIED REGISTERED

## 2019-01-15 PROCEDURE — 85027 COMPLETE CBC AUTOMATED: CPT

## 2019-01-15 PROCEDURE — 88307 TISSUE EXAM BY PATHOLOGIST: CPT

## 2019-01-15 PROCEDURE — 3600000014 HC SURGERY LEVEL 4 ADDTL 15MIN: Performed by: SURGERY

## 2019-01-15 PROCEDURE — 3700000001 HC ADD 15 MINUTES (ANESTHESIA): Performed by: SURGERY

## 2019-01-15 PROCEDURE — 86850 RBC ANTIBODY SCREEN: CPT

## 2019-01-15 PROCEDURE — 2580000003 HC RX 258: Performed by: SURGERY

## 2019-01-15 PROCEDURE — 3700000000 HC ANESTHESIA ATTENDED CARE: Performed by: SURGERY

## 2019-01-15 PROCEDURE — 86900 BLOOD TYPING SEROLOGIC ABO: CPT

## 2019-01-15 PROCEDURE — 2500000003 HC RX 250 WO HCPCS: Performed by: SURGERY

## 2019-01-15 PROCEDURE — 0Y6H0Z1 DETACHMENT AT RIGHT LOWER LEG, HIGH, OPEN APPROACH: ICD-10-PCS | Performed by: SURGERY

## 2019-01-15 PROCEDURE — 2580000003 HC RX 258: Performed by: ANESTHESIOLOGY

## 2019-01-15 PROCEDURE — 6370000000 HC RX 637 (ALT 250 FOR IP): Performed by: INTERNAL MEDICINE

## 2019-01-15 PROCEDURE — 7100000001 HC PACU RECOVERY - ADDTL 15 MIN: Performed by: SURGERY

## 2019-01-15 PROCEDURE — 86901 BLOOD TYPING SEROLOGIC RH(D): CPT

## 2019-01-15 PROCEDURE — 6360000002 HC RX W HCPCS: Performed by: SURGERY

## 2019-01-15 PROCEDURE — 2720000010 HC SURG SUPPLY STERILE: Performed by: SURGERY

## 2019-01-15 PROCEDURE — 2500000003 HC RX 250 WO HCPCS: Performed by: NURSE ANESTHETIST, CERTIFIED REGISTERED

## 2019-01-15 PROCEDURE — 2500000003 HC RX 250 WO HCPCS: Performed by: ANESTHESIOLOGY

## 2019-01-15 PROCEDURE — 83036 HEMOGLOBIN GLYCOSYLATED A1C: CPT

## 2019-01-15 RX ORDER — SODIUM CHLORIDE 0.9 % (FLUSH) 0.9 %
10 SYRINGE (ML) INJECTION PRN
Status: DISCONTINUED | OUTPATIENT
Start: 2019-01-15 | End: 2019-01-15 | Stop reason: HOSPADM

## 2019-01-15 RX ORDER — PROPOFOL 10 MG/ML
INJECTION, EMULSION INTRAVENOUS PRN
Status: DISCONTINUED | OUTPATIENT
Start: 2019-01-15 | End: 2019-01-15 | Stop reason: SDUPTHER

## 2019-01-15 RX ORDER — DEXTROSE MONOHYDRATE 50 MG/ML
100 INJECTION, SOLUTION INTRAVENOUS PRN
Status: DISCONTINUED | OUTPATIENT
Start: 2019-01-15 | End: 2019-01-18 | Stop reason: HOSPADM

## 2019-01-15 RX ORDER — INSULIN GLARGINE 100 [IU]/ML
50 INJECTION, SOLUTION SUBCUTANEOUS 2 TIMES DAILY
Status: DISCONTINUED | OUTPATIENT
Start: 2019-01-15 | End: 2019-01-16

## 2019-01-15 RX ORDER — MIDAZOLAM HYDROCHLORIDE 1 MG/ML
INJECTION INTRAMUSCULAR; INTRAVENOUS PRN
Status: DISCONTINUED | OUTPATIENT
Start: 2019-01-15 | End: 2019-01-15 | Stop reason: SDUPTHER

## 2019-01-15 RX ORDER — FENTANYL CITRATE 50 UG/ML
INJECTION, SOLUTION INTRAMUSCULAR; INTRAVENOUS PRN
Status: DISCONTINUED | OUTPATIENT
Start: 2019-01-15 | End: 2019-01-15 | Stop reason: SDUPTHER

## 2019-01-15 RX ORDER — PROMETHAZINE HYDROCHLORIDE 25 MG/ML
INJECTION, SOLUTION INTRAMUSCULAR; INTRAVENOUS PRN
Status: DISCONTINUED | OUTPATIENT
Start: 2019-01-15 | End: 2019-01-15 | Stop reason: SDUPTHER

## 2019-01-15 RX ORDER — LIDOCAINE HYDROCHLORIDE 20 MG/ML
INJECTION, SOLUTION INFILTRATION; PERINEURAL PRN
Status: DISCONTINUED | OUTPATIENT
Start: 2019-01-15 | End: 2019-01-15 | Stop reason: SDUPTHER

## 2019-01-15 RX ORDER — POLYETHYLENE GLYCOL 3350 17 G/17G
17 POWDER, FOR SOLUTION ORAL DAILY PRN
Status: DISCONTINUED | OUTPATIENT
Start: 2019-01-15 | End: 2019-01-18 | Stop reason: HOSPADM

## 2019-01-15 RX ORDER — LIDOCAINE HYDROCHLORIDE 10 MG/ML
0.3 INJECTION, SOLUTION EPIDURAL; INFILTRATION; INTRACAUDAL; PERINEURAL
Status: COMPLETED | OUTPATIENT
Start: 2019-01-15 | End: 2019-01-15

## 2019-01-15 RX ORDER — PROMETHAZINE HYDROCHLORIDE 25 MG/1
25 TABLET ORAL EVERY 6 HOURS PRN
Status: DISCONTINUED | OUTPATIENT
Start: 2019-01-15 | End: 2019-01-18 | Stop reason: HOSPADM

## 2019-01-15 RX ORDER — SODIUM CHLORIDE, SODIUM LACTATE, POTASSIUM CHLORIDE, CALCIUM CHLORIDE 600; 310; 30; 20 MG/100ML; MG/100ML; MG/100ML; MG/100ML
INJECTION, SOLUTION INTRAVENOUS CONTINUOUS
Status: DISCONTINUED | OUTPATIENT
Start: 2019-01-15 | End: 2019-01-15

## 2019-01-15 RX ORDER — FERROUS SULFATE 325(65) MG
650 TABLET ORAL NIGHTLY
Status: DISCONTINUED | OUTPATIENT
Start: 2019-01-15 | End: 2019-01-18 | Stop reason: HOSPADM

## 2019-01-15 RX ORDER — NICOTINE POLACRILEX 4 MG
15 LOZENGE BUCCAL PRN
Status: DISCONTINUED | OUTPATIENT
Start: 2019-01-15 | End: 2019-01-18 | Stop reason: HOSPADM

## 2019-01-15 RX ORDER — TORSEMIDE 20 MG/1
40 TABLET ORAL DAILY
Status: DISCONTINUED | OUTPATIENT
Start: 2019-01-16 | End: 2019-01-16

## 2019-01-15 RX ORDER — DEXTROSE MONOHYDRATE 25 G/50ML
12.5 INJECTION, SOLUTION INTRAVENOUS PRN
Status: DISCONTINUED | OUTPATIENT
Start: 2019-01-15 | End: 2019-01-18 | Stop reason: HOSPADM

## 2019-01-15 RX ORDER — NITROGLYCERIN 0.4 MG/1
0.4 TABLET SUBLINGUAL EVERY 5 MIN PRN
Status: DISCONTINUED | OUTPATIENT
Start: 2019-01-15 | End: 2019-01-18 | Stop reason: HOSPADM

## 2019-01-15 RX ORDER — PANTOPRAZOLE SODIUM 40 MG/1
40 TABLET, DELAYED RELEASE ORAL
Status: DISCONTINUED | OUTPATIENT
Start: 2019-01-16 | End: 2019-01-18 | Stop reason: HOSPADM

## 2019-01-15 RX ORDER — MAGNESIUM HYDROXIDE 1200 MG/15ML
LIQUID ORAL CONTINUOUS PRN
Status: COMPLETED | OUTPATIENT
Start: 2019-01-15 | End: 2019-01-15

## 2019-01-15 RX ORDER — DOCUSATE SODIUM 100 MG/1
200 CAPSULE, LIQUID FILLED ORAL DAILY
Status: DISCONTINUED | OUTPATIENT
Start: 2019-01-15 | End: 2019-01-18 | Stop reason: HOSPADM

## 2019-01-15 RX ORDER — TRAZODONE HYDROCHLORIDE 50 MG/1
25 TABLET ORAL NIGHTLY
Status: DISCONTINUED | OUTPATIENT
Start: 2019-01-15 | End: 2019-01-18 | Stop reason: HOSPADM

## 2019-01-15 RX ORDER — OXYCODONE HYDROCHLORIDE 5 MG/1
5 TABLET ORAL EVERY 4 HOURS PRN
Status: DISCONTINUED | OUTPATIENT
Start: 2019-01-15 | End: 2019-01-16

## 2019-01-15 RX ORDER — SENNA PLUS 8.6 MG/1
2 TABLET ORAL NIGHTLY
Status: DISCONTINUED | OUTPATIENT
Start: 2019-01-15 | End: 2019-01-18 | Stop reason: HOSPADM

## 2019-01-15 RX ORDER — ASPIRIN 81 MG/1
81 TABLET, CHEWABLE ORAL DAILY
Status: DISCONTINUED | OUTPATIENT
Start: 2019-01-16 | End: 2019-01-16

## 2019-01-15 RX ORDER — ROSUVASTATIN CALCIUM 10 MG/1
20 TABLET, COATED ORAL NIGHTLY
Status: DISCONTINUED | OUTPATIENT
Start: 2019-01-15 | End: 2019-01-18 | Stop reason: HOSPADM

## 2019-01-15 RX ORDER — METOPROLOL SUCCINATE 50 MG/1
100 TABLET, EXTENDED RELEASE ORAL NIGHTLY
Status: DISCONTINUED | OUTPATIENT
Start: 2019-01-15 | End: 2019-01-18 | Stop reason: HOSPADM

## 2019-01-15 RX ORDER — BACITRACIN 500 [USP'U]/G
OINTMENT TOPICAL DAILY
Status: DISCONTINUED | OUTPATIENT
Start: 2019-01-16 | End: 2019-01-18 | Stop reason: HOSPADM

## 2019-01-15 RX ORDER — CYCLOBENZAPRINE HCL 10 MG
10 TABLET ORAL 2 TIMES DAILY PRN
Status: DISCONTINUED | OUTPATIENT
Start: 2019-01-15 | End: 2019-01-18 | Stop reason: HOSPADM

## 2019-01-15 RX ORDER — FERROUS SULFATE 325(65) MG
325 TABLET ORAL
Status: DISCONTINUED | OUTPATIENT
Start: 2019-01-16 | End: 2019-01-18 | Stop reason: HOSPADM

## 2019-01-15 RX ORDER — SODIUM CHLORIDE 0.9 % (FLUSH) 0.9 %
10 SYRINGE (ML) INJECTION EVERY 12 HOURS SCHEDULED
Status: DISCONTINUED | OUTPATIENT
Start: 2019-01-15 | End: 2019-01-15 | Stop reason: HOSPADM

## 2019-01-15 RX ORDER — FERROUS SULFATE 325(65) MG
325 TABLET ORAL
Status: DISCONTINUED | OUTPATIENT
Start: 2019-01-15 | End: 2019-01-15

## 2019-01-15 RX ADMIN — LIDOCAINE HYDROCHLORIDE 40 MG: 20 INJECTION, SOLUTION INFILTRATION; PERINEURAL at 12:30

## 2019-01-15 RX ADMIN — DOCUSATE SODIUM 200 MG: 100 CAPSULE, LIQUID FILLED ORAL at 22:51

## 2019-01-15 RX ADMIN — PHENYLEPHRINE HYDROCHLORIDE 100 MCG: 10 INJECTION INTRAVENOUS at 13:11

## 2019-01-15 RX ADMIN — PHENYLEPHRINE HYDROCHLORIDE 100 MCG: 10 INJECTION INTRAVENOUS at 12:47

## 2019-01-15 RX ADMIN — PHENYLEPHRINE HYDROCHLORIDE 100 MCG: 10 INJECTION INTRAVENOUS at 12:30

## 2019-01-15 RX ADMIN — FENTANYL CITRATE 50 MCG: 50 INJECTION INTRAMUSCULAR; INTRAVENOUS at 12:35

## 2019-01-15 RX ADMIN — FENTANYL CITRATE 50 MCG: 50 INJECTION INTRAMUSCULAR; INTRAVENOUS at 12:30

## 2019-01-15 RX ADMIN — INSULIN LISPRO 12 UNITS: 100 INJECTION, SOLUTION INTRAVENOUS; SUBCUTANEOUS at 16:54

## 2019-01-15 RX ADMIN — OXYCODONE HYDROCHLORIDE 5 MG: 5 TABLET ORAL at 19:27

## 2019-01-15 RX ADMIN — METOPROLOL SUCCINATE 100 MG: 50 TABLET, EXTENDED RELEASE ORAL at 22:44

## 2019-01-15 RX ADMIN — VANCOMYCIN HYDROCHLORIDE 1.5 G: 500 INJECTION, POWDER, LYOPHILIZED, FOR SOLUTION INTRAVENOUS at 12:00

## 2019-01-15 RX ADMIN — ROSUVASTATIN CALCIUM 20 MG: 10 TABLET, FILM COATED ORAL at 22:43

## 2019-01-15 RX ADMIN — METFORMIN HYDROCHLORIDE 1000 MG: 500 TABLET ORAL at 16:53

## 2019-01-15 RX ADMIN — MICONAZOLE NITRATE: 2 POWDER TOPICAL at 22:45

## 2019-01-15 RX ADMIN — LIDOCAINE HYDROCHLORIDE 0.3 ML: 10 INJECTION, SOLUTION EPIDURAL; INFILTRATION; INTRACAUDAL; PERINEURAL at 11:17

## 2019-01-15 RX ADMIN — PROPOFOL 200 MG: 10 INJECTION, EMULSION INTRAVENOUS at 12:30

## 2019-01-15 RX ADMIN — FERROUS SULFATE TAB 325 MG (65 MG ELEMENTAL FE) 650 MG: 325 (65 FE) TAB at 22:44

## 2019-01-15 RX ADMIN — FENTANYL CITRATE 50 MCG: 50 INJECTION INTRAMUSCULAR; INTRAVENOUS at 13:15

## 2019-01-15 RX ADMIN — SODIUM CHLORIDE, POTASSIUM CHLORIDE, SODIUM LACTATE AND CALCIUM CHLORIDE: 600; 310; 30; 20 INJECTION, SOLUTION INTRAVENOUS at 11:16

## 2019-01-15 RX ADMIN — PHENYLEPHRINE HYDROCHLORIDE 100 MCG: 10 INJECTION INTRAVENOUS at 12:50

## 2019-01-15 RX ADMIN — SENNOSIDES 17.2 MG: 8.6 TABLET, FILM COATED ORAL at 22:50

## 2019-01-15 RX ADMIN — PROMETHAZINE HYDROCHLORIDE 12.5 MG: 25 INJECTION INTRAMUSCULAR; INTRAVENOUS at 12:49

## 2019-01-15 RX ADMIN — PHENYLEPHRINE HYDROCHLORIDE 100 MCG: 10 INJECTION INTRAVENOUS at 13:15

## 2019-01-15 RX ADMIN — PHENYLEPHRINE HYDROCHLORIDE 100 MCG: 10 INJECTION INTRAVENOUS at 12:39

## 2019-01-15 RX ADMIN — TRAZODONE HYDROCHLORIDE 25 MG: 50 TABLET ORAL at 22:44

## 2019-01-15 RX ADMIN — FENTANYL CITRATE 50 MCG: 50 INJECTION INTRAMUSCULAR; INTRAVENOUS at 12:49

## 2019-01-15 RX ADMIN — MIDAZOLAM 2 MG: 1 INJECTION INTRAMUSCULAR; INTRAVENOUS at 12:28

## 2019-01-15 ASSESSMENT — PULMONARY FUNCTION TESTS
PIF_VALUE: 5
PIF_VALUE: 8
PIF_VALUE: 0
PIF_VALUE: 7
PIF_VALUE: 0
PIF_VALUE: 8
PIF_VALUE: 7
PIF_VALUE: 7
PIF_VALUE: 4
PIF_VALUE: 8
PIF_VALUE: 0
PIF_VALUE: 7
PIF_VALUE: 8
PIF_VALUE: 7
PIF_VALUE: 3
PIF_VALUE: 2
PIF_VALUE: 0
PIF_VALUE: 1
PIF_VALUE: 1
PIF_VALUE: 8
PIF_VALUE: 0
PIF_VALUE: 1
PIF_VALUE: 0
PIF_VALUE: 9
PIF_VALUE: 7
PIF_VALUE: 8
PIF_VALUE: 0
PIF_VALUE: 0
PIF_VALUE: 8
PIF_VALUE: 0
PIF_VALUE: 0
PIF_VALUE: 25
PIF_VALUE: 8
PIF_VALUE: 8
PIF_VALUE: 4
PIF_VALUE: 0
PIF_VALUE: 3
PIF_VALUE: 8
PIF_VALUE: 0
PIF_VALUE: 0
PIF_VALUE: 3
PIF_VALUE: 8
PIF_VALUE: 7
PIF_VALUE: 8
PIF_VALUE: 0
PIF_VALUE: 8
PIF_VALUE: 0
PIF_VALUE: 8
PIF_VALUE: 8
PIF_VALUE: 9
PIF_VALUE: 3
PIF_VALUE: 4
PIF_VALUE: 3
PIF_VALUE: 7
PIF_VALUE: 8
PIF_VALUE: 0
PIF_VALUE: 8
PIF_VALUE: 7
PIF_VALUE: 8
PIF_VALUE: 8
PIF_VALUE: 0
PIF_VALUE: 7
PIF_VALUE: 0
PIF_VALUE: 7
PIF_VALUE: 0
PIF_VALUE: 8
PIF_VALUE: 7
PIF_VALUE: 8
PIF_VALUE: 0
PIF_VALUE: 0
PIF_VALUE: 7
PIF_VALUE: 7
PIF_VALUE: 0
PIF_VALUE: 8
PIF_VALUE: 4
PIF_VALUE: 4

## 2019-01-15 ASSESSMENT — PAIN SCALES - GENERAL
PAINLEVEL_OUTOF10: 0
PAINLEVEL_OUTOF10: 3
PAINLEVEL_OUTOF10: 6
PAINLEVEL_OUTOF10: 0
PAINLEVEL_OUTOF10: 0
PAINLEVEL_OUTOF10: 2

## 2019-01-15 ASSESSMENT — PAIN DESCRIPTION - PROGRESSION: CLINICAL_PROGRESSION: NOT CHANGED

## 2019-01-15 ASSESSMENT — PAIN - FUNCTIONAL ASSESSMENT: PAIN_FUNCTIONAL_ASSESSMENT: 0-10

## 2019-01-15 ASSESSMENT — PAIN DESCRIPTION - DESCRIPTORS
DESCRIPTORS: DISCOMFORT;HEADACHE
DESCRIPTORS: SHARP;STABBING
DESCRIPTORS: SHARP;STABBING

## 2019-01-15 ASSESSMENT — PAIN DESCRIPTION - ORIENTATION: ORIENTATION: RIGHT

## 2019-01-15 ASSESSMENT — PAIN DESCRIPTION - FREQUENCY
FREQUENCY: CONTINUOUS
FREQUENCY: CONTINUOUS

## 2019-01-15 ASSESSMENT — PAIN DESCRIPTION - PAIN TYPE
TYPE: CHRONIC PAIN
TYPE: ACUTE PAIN

## 2019-01-15 ASSESSMENT — PAIN DESCRIPTION - LOCATION
LOCATION: NECK
LOCATION: HEAD

## 2019-01-15 ASSESSMENT — ACTIVITIES OF DAILY LIVING (ADL): EFFECT OF PAIN ON DAILY ACTIVITIES: TURNING

## 2019-01-15 ASSESSMENT — PAIN DESCRIPTION - ONSET: ONSET: ON-GOING

## 2019-01-16 LAB
ANION GAP SERPL CALCULATED.3IONS-SCNC: 14 MMOL/L (ref 3–16)
APTT: 34.1 SEC (ref 26–36)
APTT: 35.8 SEC (ref 26–36)
BASOPHILS ABSOLUTE: 0.1 K/UL (ref 0–0.2)
BASOPHILS RELATIVE PERCENT: 1.1 %
BUN BLDV-MCNC: 30 MG/DL (ref 7–20)
CALCIUM SERPL-MCNC: 8.1 MG/DL (ref 8.3–10.6)
CHLORIDE BLD-SCNC: 95 MMOL/L (ref 99–110)
CO2: 25 MMOL/L (ref 21–32)
CREAT SERPL-MCNC: 0.8 MG/DL (ref 0.9–1.3)
EOSINOPHILS ABSOLUTE: 0.3 K/UL (ref 0–0.6)
EOSINOPHILS RELATIVE PERCENT: 2 %
ESTIMATED AVERAGE GLUCOSE: 122.6 MG/DL
GFR AFRICAN AMERICAN: >60
GFR NON-AFRICAN AMERICAN: >60
GLUCOSE BLD-MCNC: 106 MG/DL (ref 70–99)
GLUCOSE BLD-MCNC: 116 MG/DL (ref 70–99)
GLUCOSE BLD-MCNC: 209 MG/DL (ref 70–99)
GLUCOSE BLD-MCNC: 218 MG/DL (ref 70–99)
GLUCOSE BLD-MCNC: 231 MG/DL (ref 70–99)
GLUCOSE BLD-MCNC: 96 MG/DL (ref 70–99)
HBA1C MFR BLD: 5.9 %
HCT VFR BLD CALC: 25.8 % (ref 40.5–52.5)
HCT VFR BLD CALC: 26.4 % (ref 40.5–52.5)
HEMOGLOBIN: 8.1 G/DL (ref 13.5–17.5)
HEMOGLOBIN: 8.1 G/DL (ref 13.5–17.5)
LYMPHOCYTES ABSOLUTE: 1 K/UL (ref 1–5.1)
LYMPHOCYTES RELATIVE PERCENT: 8.2 %
MCH RBC QN AUTO: 24.7 PG (ref 26–34)
MCH RBC QN AUTO: 25.3 PG (ref 26–34)
MCHC RBC AUTO-ENTMCNC: 30.7 G/DL (ref 31–36)
MCHC RBC AUTO-ENTMCNC: 31.3 G/DL (ref 31–36)
MCV RBC AUTO: 80.3 FL (ref 80–100)
MCV RBC AUTO: 81.1 FL (ref 80–100)
MONOCYTES ABSOLUTE: 0.8 K/UL (ref 0–1.3)
MONOCYTES RELATIVE PERCENT: 6.8 %
NEUTROPHILS ABSOLUTE: 10.1 K/UL (ref 1.7–7.7)
NEUTROPHILS RELATIVE PERCENT: 81.9 %
PDW BLD-RTO: 24.4 % (ref 12.4–15.4)
PDW BLD-RTO: 24.8 % (ref 12.4–15.4)
PERFORMED ON: ABNORMAL
PERFORMED ON: NORMAL
PLATELET # BLD: 381 K/UL (ref 135–450)
PLATELET # BLD: 382 K/UL (ref 135–450)
PMV BLD AUTO: 8.1 FL (ref 5–10.5)
PMV BLD AUTO: 8.1 FL (ref 5–10.5)
POTASSIUM SERPL-SCNC: 3.8 MMOL/L (ref 3.5–5.1)
RBC # BLD: 3.18 M/UL (ref 4.2–5.9)
RBC # BLD: 3.28 M/UL (ref 4.2–5.9)
SODIUM BLD-SCNC: 134 MMOL/L (ref 136–145)
WBC # BLD: 12.3 K/UL (ref 4–11)
WBC # BLD: 13.1 K/UL (ref 4–11)

## 2019-01-16 PROCEDURE — 99024 POSTOP FOLLOW-UP VISIT: CPT | Performed by: NURSE PRACTITIONER

## 2019-01-16 PROCEDURE — 6370000000 HC RX 637 (ALT 250 FOR IP): Performed by: INTERNAL MEDICINE

## 2019-01-16 PROCEDURE — 82746 ASSAY OF FOLIC ACID SERUM: CPT

## 2019-01-16 PROCEDURE — 36415 COLL VENOUS BLD VENIPUNCTURE: CPT

## 2019-01-16 PROCEDURE — 6370000000 HC RX 637 (ALT 250 FOR IP): Performed by: SURGERY

## 2019-01-16 PROCEDURE — 85730 THROMBOPLASTIN TIME PARTIAL: CPT

## 2019-01-16 PROCEDURE — 1200000000 HC SEMI PRIVATE

## 2019-01-16 PROCEDURE — 82607 VITAMIN B-12: CPT

## 2019-01-16 PROCEDURE — 97530 THERAPEUTIC ACTIVITIES: CPT

## 2019-01-16 PROCEDURE — 85027 COMPLETE CBC AUTOMATED: CPT

## 2019-01-16 PROCEDURE — 6360000002 HC RX W HCPCS: Performed by: INTERNAL MEDICINE

## 2019-01-16 PROCEDURE — 80048 BASIC METABOLIC PNL TOTAL CA: CPT

## 2019-01-16 PROCEDURE — 97162 PT EVAL MOD COMPLEX 30 MIN: CPT

## 2019-01-16 PROCEDURE — 85025 COMPLETE CBC W/AUTO DIFF WBC: CPT

## 2019-01-16 PROCEDURE — 99223 1ST HOSP IP/OBS HIGH 75: CPT | Performed by: INTERNAL MEDICINE

## 2019-01-16 RX ORDER — ASPIRIN 81 MG/1
81 TABLET, CHEWABLE ORAL NIGHTLY
Status: DISCONTINUED | OUTPATIENT
Start: 2019-01-16 | End: 2019-01-18 | Stop reason: HOSPADM

## 2019-01-16 RX ORDER — INSULIN GLARGINE 100 [IU]/ML
25 INJECTION, SOLUTION SUBCUTANEOUS 2 TIMES DAILY
Status: DISCONTINUED | OUTPATIENT
Start: 2019-01-16 | End: 2019-01-18 | Stop reason: HOSPADM

## 2019-01-16 RX ORDER — HEPARIN SODIUM 1000 [USP'U]/ML
30 INJECTION, SOLUTION INTRAVENOUS; SUBCUTANEOUS PRN
Status: DISCONTINUED | OUTPATIENT
Start: 2019-01-16 | End: 2019-01-17

## 2019-01-16 RX ORDER — TORSEMIDE 20 MG/1
20 TABLET ORAL NIGHTLY
Status: DISCONTINUED | OUTPATIENT
Start: 2019-01-17 | End: 2019-01-18

## 2019-01-16 RX ORDER — TORSEMIDE 20 MG/1
20 TABLET ORAL DAILY
Status: DISCONTINUED | OUTPATIENT
Start: 2019-01-17 | End: 2019-01-16

## 2019-01-16 RX ORDER — HEPARIN SODIUM 1000 [USP'U]/ML
60 INJECTION, SOLUTION INTRAVENOUS; SUBCUTANEOUS PRN
Status: DISCONTINUED | OUTPATIENT
Start: 2019-01-16 | End: 2019-01-17

## 2019-01-16 RX ORDER — HEPARIN SODIUM 1000 [USP'U]/ML
30 INJECTION, SOLUTION INTRAVENOUS; SUBCUTANEOUS ONCE
Status: COMPLETED | OUTPATIENT
Start: 2019-01-16 | End: 2019-01-16

## 2019-01-16 RX ORDER — HEPARIN SODIUM 10000 [USP'U]/100ML
12 INJECTION, SOLUTION INTRAVENOUS CONTINUOUS
Status: DISCONTINUED | OUTPATIENT
Start: 2019-01-16 | End: 2019-01-16 | Stop reason: SDUPTHER

## 2019-01-16 RX ORDER — HEPARIN SODIUM 1000 [USP'U]/ML
4000 INJECTION, SOLUTION INTRAVENOUS; SUBCUTANEOUS ONCE
Status: COMPLETED | OUTPATIENT
Start: 2019-01-16 | End: 2019-01-16

## 2019-01-16 RX ORDER — OXYCODONE HYDROCHLORIDE 5 MG/1
5 TABLET ORAL EVERY 6 HOURS PRN
Status: DISCONTINUED | OUTPATIENT
Start: 2019-01-16 | End: 2019-01-18 | Stop reason: HOSPADM

## 2019-01-16 RX ADMIN — METFORMIN HYDROCHLORIDE 1000 MG: 500 TABLET ORAL at 08:11

## 2019-01-16 RX ADMIN — MICONAZOLE NITRATE: 2 POWDER TOPICAL at 21:20

## 2019-01-16 RX ADMIN — ROSUVASTATIN CALCIUM 20 MG: 10 TABLET, FILM COATED ORAL at 21:19

## 2019-01-16 RX ADMIN — MICONAZOLE NITRATE: 2 POWDER TOPICAL at 08:17

## 2019-01-16 RX ADMIN — INSULIN GLARGINE 50 UNITS: 100 INJECTION, SOLUTION SUBCUTANEOUS at 08:17

## 2019-01-16 RX ADMIN — HEPARIN SODIUM 1000 UNITS/HR: 10000 INJECTION, SOLUTION INTRAVENOUS at 13:59

## 2019-01-16 RX ADMIN — HEPARIN SODIUM 2840 UNITS: 1000 INJECTION, SOLUTION INTRAVENOUS; SUBCUTANEOUS at 21:27

## 2019-01-16 RX ADMIN — PANTOPRAZOLE SODIUM 40 MG: 40 TABLET, DELAYED RELEASE ORAL at 07:21

## 2019-01-16 RX ADMIN — FERROUS SULFATE TAB 325 MG (65 MG ELEMENTAL FE) 650 MG: 325 (65 FE) TAB at 21:19

## 2019-01-16 RX ADMIN — INSULIN LISPRO 2 UNITS: 100 INJECTION, SOLUTION INTRAVENOUS; SUBCUTANEOUS at 12:07

## 2019-01-16 RX ADMIN — TRAZODONE HYDROCHLORIDE 25 MG: 50 TABLET ORAL at 21:19

## 2019-01-16 RX ADMIN — ZINC OXIDE: 200 OINTMENT TOPICAL at 12:45

## 2019-01-16 RX ADMIN — HEPARIN SODIUM 4000 UNITS: 1000 INJECTION, SOLUTION INTRAVENOUS; SUBCUTANEOUS at 13:52

## 2019-01-16 RX ADMIN — INSULIN LISPRO 2 UNITS: 100 INJECTION, SOLUTION INTRAVENOUS; SUBCUTANEOUS at 16:42

## 2019-01-16 RX ADMIN — METFORMIN HYDROCHLORIDE 1000 MG: 500 TABLET ORAL at 17:08

## 2019-01-16 RX ADMIN — INSULIN GLARGINE 25 UNITS: 100 INJECTION, SOLUTION SUBCUTANEOUS at 21:27

## 2019-01-16 RX ADMIN — SENNOSIDES 17.2 MG: 8.6 TABLET, FILM COATED ORAL at 21:19

## 2019-01-16 RX ADMIN — DOCUSATE SODIUM 200 MG: 100 CAPSULE, LIQUID FILLED ORAL at 08:11

## 2019-01-16 RX ADMIN — METOPROLOL SUCCINATE 100 MG: 50 TABLET, EXTENDED RELEASE ORAL at 21:19

## 2019-01-16 RX ADMIN — ASPIRIN 81 MG 81 MG: 81 TABLET ORAL at 21:19

## 2019-01-16 RX ADMIN — Medication 2000 MG: at 09:00

## 2019-01-16 RX ADMIN — FERROUS SULFATE TAB 325 MG (65 MG ELEMENTAL FE) 325 MG: 325 (65 FE) TAB at 08:11

## 2019-01-17 ENCOUNTER — ANESTHESIA (OUTPATIENT)
Dept: OPERATING ROOM | Age: 54
DRG: 616 | End: 2019-01-17
Payer: MEDICARE

## 2019-01-17 ENCOUNTER — APPOINTMENT (OUTPATIENT)
Dept: GENERAL RADIOLOGY | Age: 54
DRG: 616 | End: 2019-01-17
Attending: SURGERY
Payer: MEDICARE

## 2019-01-17 ENCOUNTER — ANESTHESIA EVENT (OUTPATIENT)
Dept: OPERATING ROOM | Age: 54
DRG: 616 | End: 2019-01-17
Payer: MEDICARE

## 2019-01-17 VITALS — DIASTOLIC BLOOD PRESSURE: 77 MMHG | SYSTOLIC BLOOD PRESSURE: 116 MMHG | OXYGEN SATURATION: 100 %

## 2019-01-17 LAB
ANION GAP SERPL CALCULATED.3IONS-SCNC: 12 MMOL/L (ref 3–16)
APTT: 33.2 SEC (ref 26–36)
BASOPHILS ABSOLUTE: 0.1 K/UL (ref 0–0.2)
BASOPHILS RELATIVE PERCENT: 0.8 %
BUN BLDV-MCNC: 31 MG/DL (ref 7–20)
CALCIUM SERPL-MCNC: 8.3 MG/DL (ref 8.3–10.6)
CHLORIDE BLD-SCNC: 96 MMOL/L (ref 99–110)
CO2: 26 MMOL/L (ref 21–32)
CREAT SERPL-MCNC: 0.8 MG/DL (ref 0.9–1.3)
EOSINOPHILS ABSOLUTE: 0.1 K/UL (ref 0–0.6)
EOSINOPHILS RELATIVE PERCENT: 0.8 %
FOLATE: 10.03 NG/ML (ref 4.78–24.2)
GFR AFRICAN AMERICAN: >60
GFR NON-AFRICAN AMERICAN: >60
GLUCOSE BLD-MCNC: 165 MG/DL (ref 70–99)
GLUCOSE BLD-MCNC: 171 MG/DL (ref 70–99)
GLUCOSE BLD-MCNC: 201 MG/DL (ref 70–99)
GLUCOSE BLD-MCNC: 208 MG/DL (ref 70–99)
GLUCOSE BLD-MCNC: 241 MG/DL (ref 70–99)
GLUCOSE BLD-MCNC: 278 MG/DL (ref 70–99)
HCT VFR BLD CALC: 24.4 % (ref 40.5–52.5)
HEMOGLOBIN: 7.4 G/DL (ref 13.5–17.5)
IRON SATURATION: 16 % (ref 20–50)
IRON: 24 UG/DL (ref 59–158)
LYMPHOCYTES ABSOLUTE: 1.2 K/UL (ref 1–5.1)
LYMPHOCYTES RELATIVE PERCENT: 10.1 %
MCH RBC QN AUTO: 25.2 PG (ref 26–34)
MCHC RBC AUTO-ENTMCNC: 30.3 G/DL (ref 31–36)
MCV RBC AUTO: 82.9 FL (ref 80–100)
MONOCYTES ABSOLUTE: 1 K/UL (ref 0–1.3)
MONOCYTES RELATIVE PERCENT: 8.7 %
NEUTROPHILS ABSOLUTE: 9.2 K/UL (ref 1.7–7.7)
NEUTROPHILS RELATIVE PERCENT: 79.6 %
PDW BLD-RTO: 24.4 % (ref 12.4–15.4)
PERFORMED ON: ABNORMAL
PLATELET # BLD: 364 K/UL (ref 135–450)
PMV BLD AUTO: 7.8 FL (ref 5–10.5)
POTASSIUM REFLEX MAGNESIUM: 3.9 MMOL/L (ref 3.5–5.1)
RBC # BLD: 2.94 M/UL (ref 4.2–5.9)
SODIUM BLD-SCNC: 134 MMOL/L (ref 136–145)
TOTAL IRON BINDING CAPACITY: 146 UG/DL (ref 260–445)
VITAMIN B-12: 318 PG/ML (ref 211–911)
WBC # BLD: 11.6 K/UL (ref 4–11)

## 2019-01-17 PROCEDURE — 2580000003 HC RX 258: Performed by: SURGERY

## 2019-01-17 PROCEDURE — 99232 SBSQ HOSP IP/OBS MODERATE 35: CPT | Performed by: INTERNAL MEDICINE

## 2019-01-17 PROCEDURE — 2709999900 HC NON-CHARGEABLE SUPPLY: Performed by: SURGERY

## 2019-01-17 PROCEDURE — 2580000003 HC RX 258: Performed by: ANESTHESIOLOGY

## 2019-01-17 PROCEDURE — 2580000003 HC RX 258

## 2019-01-17 PROCEDURE — 29445 APPL RIGID TOT CNTC LEG CAST: CPT | Performed by: INTERNAL MEDICINE

## 2019-01-17 PROCEDURE — 6370000000 HC RX 637 (ALT 250 FOR IP): Performed by: INTERNAL MEDICINE

## 2019-01-17 PROCEDURE — 99024 POSTOP FOLLOW-UP VISIT: CPT | Performed by: NURSE PRACTITIONER

## 2019-01-17 PROCEDURE — 83550 IRON BINDING TEST: CPT

## 2019-01-17 PROCEDURE — 7100000010 HC PHASE II RECOVERY - FIRST 15 MIN: Performed by: SURGERY

## 2019-01-17 PROCEDURE — 83540 ASSAY OF IRON: CPT

## 2019-01-17 PROCEDURE — 6360000002 HC RX W HCPCS: Performed by: SURGERY

## 2019-01-17 PROCEDURE — 71045 X-RAY EXAM CHEST 1 VIEW: CPT

## 2019-01-17 PROCEDURE — 7100000011 HC PHASE II RECOVERY - ADDTL 15 MIN: Performed by: SURGERY

## 2019-01-17 PROCEDURE — C1788 PORT, INDWELLING, IMP: HCPCS | Performed by: SURGERY

## 2019-01-17 PROCEDURE — 2500000003 HC RX 250 WO HCPCS: Performed by: SURGERY

## 2019-01-17 PROCEDURE — 36415 COLL VENOUS BLD VENIPUNCTURE: CPT

## 2019-01-17 PROCEDURE — 77001 FLUOROGUIDE FOR VEIN DEVICE: CPT | Performed by: SURGERY

## 2019-01-17 PROCEDURE — 3700000001 HC ADD 15 MINUTES (ANESTHESIA): Performed by: SURGERY

## 2019-01-17 PROCEDURE — 2500000003 HC RX 250 WO HCPCS: Performed by: NURSE ANESTHETIST, CERTIFIED REGISTERED

## 2019-01-17 PROCEDURE — B518ZZA FLUOROSCOPY OF SUPERIOR VENA CAVA, GUIDANCE: ICD-10-PCS | Performed by: SURGERY

## 2019-01-17 PROCEDURE — 85025 COMPLETE CBC W/AUTO DIFF WBC: CPT

## 2019-01-17 PROCEDURE — 6370000000 HC RX 637 (ALT 250 FOR IP): Performed by: SURGERY

## 2019-01-17 PROCEDURE — 76937 US GUIDE VASCULAR ACCESS: CPT | Performed by: SURGERY

## 2019-01-17 PROCEDURE — 77001 FLUOROGUIDE FOR VEIN DEVICE: CPT

## 2019-01-17 PROCEDURE — 1200000000 HC SEMI PRIVATE

## 2019-01-17 PROCEDURE — 02HV33Z INSERTION OF INFUSION DEVICE INTO SUPERIOR VENA CAVA, PERCUTANEOUS APPROACH: ICD-10-PCS | Performed by: SURGERY

## 2019-01-17 PROCEDURE — 85730 THROMBOPLASTIN TIME PARTIAL: CPT

## 2019-01-17 PROCEDURE — 80048 BASIC METABOLIC PNL TOTAL CA: CPT

## 2019-01-17 PROCEDURE — 3700000000 HC ANESTHESIA ATTENDED CARE: Performed by: SURGERY

## 2019-01-17 PROCEDURE — 36561 INSERT TUNNELED CV CATH: CPT | Performed by: SURGERY

## 2019-01-17 PROCEDURE — 3600000012 HC SURGERY LEVEL 2 ADDTL 15MIN: Performed by: SURGERY

## 2019-01-17 PROCEDURE — B548ZZA ULTRASONOGRAPHY OF SUPERIOR VENA CAVA, GUIDANCE: ICD-10-PCS | Performed by: SURGERY

## 2019-01-17 PROCEDURE — 3600000002 HC SURGERY LEVEL 2 BASE: Performed by: SURGERY

## 2019-01-17 PROCEDURE — 6360000002 HC RX W HCPCS: Performed by: NURSE ANESTHETIST, CERTIFIED REGISTERED

## 2019-01-17 DEVICE — PORT INFUS 8FR PWR INJ CT FOR VASC ACCS CATH: Type: IMPLANTABLE DEVICE | Site: CHEST | Status: FUNCTIONAL

## 2019-01-17 RX ORDER — LIDOCAINE HYDROCHLORIDE 10 MG/ML
INJECTION, SOLUTION EPIDURAL; INFILTRATION; INTRACAUDAL; PERINEURAL PRN
Status: DISCONTINUED | OUTPATIENT
Start: 2019-01-17 | End: 2019-01-17 | Stop reason: HOSPADM

## 2019-01-17 RX ORDER — LABETALOL HYDROCHLORIDE 5 MG/ML
5 INJECTION, SOLUTION INTRAVENOUS EVERY 10 MIN PRN
Status: DISCONTINUED | OUTPATIENT
Start: 2019-01-17 | End: 2019-01-17 | Stop reason: HOSPADM

## 2019-01-17 RX ORDER — MEPERIDINE HYDROCHLORIDE 25 MG/ML
12.5 INJECTION INTRAMUSCULAR; INTRAVENOUS; SUBCUTANEOUS EVERY 5 MIN PRN
Status: DISCONTINUED | OUTPATIENT
Start: 2019-01-17 | End: 2019-01-17 | Stop reason: HOSPADM

## 2019-01-17 RX ORDER — VANCOMYCIN HYDROCHLORIDE 1 G/20ML
INJECTION, POWDER, LYOPHILIZED, FOR SOLUTION INTRAVENOUS
Status: DISCONTINUED
Start: 2019-01-17 | End: 2019-01-18

## 2019-01-17 RX ORDER — SODIUM CHLORIDE 0.9 % (FLUSH) 0.9 %
SYRINGE (ML) INJECTION
Status: DISPENSED
Start: 2019-01-17 | End: 2019-01-17

## 2019-01-17 RX ORDER — SODIUM CHLORIDE, SODIUM LACTATE, POTASSIUM CHLORIDE, CALCIUM CHLORIDE 600; 310; 30; 20 MG/100ML; MG/100ML; MG/100ML; MG/100ML
INJECTION, SOLUTION INTRAVENOUS CONTINUOUS
Status: DISCONTINUED | OUTPATIENT
Start: 2019-01-17 | End: 2019-01-18 | Stop reason: HOSPADM

## 2019-01-17 RX ORDER — HYDROMORPHONE HCL 110MG/55ML
0.25 PATIENT CONTROLLED ANALGESIA SYRINGE INTRAVENOUS EVERY 5 MIN PRN
Status: DISCONTINUED | OUTPATIENT
Start: 2019-01-17 | End: 2019-01-17 | Stop reason: HOSPADM

## 2019-01-17 RX ORDER — HEPARIN SODIUM (PORCINE) LOCK FLUSH IV SOLN 100 UNIT/ML 100 UNIT/ML
SOLUTION INTRAVENOUS PRN
Status: DISCONTINUED | OUTPATIENT
Start: 2019-01-17 | End: 2019-01-17 | Stop reason: HOSPADM

## 2019-01-17 RX ORDER — OXYCODONE HYDROCHLORIDE AND ACETAMINOPHEN 5; 325 MG/1; MG/1
2 TABLET ORAL PRN
Status: DISCONTINUED | OUTPATIENT
Start: 2019-01-17 | End: 2019-01-17 | Stop reason: HOSPADM

## 2019-01-17 RX ORDER — PROMETHAZINE HYDROCHLORIDE 25 MG/ML
6.25 INJECTION, SOLUTION INTRAMUSCULAR; INTRAVENOUS
Status: DISCONTINUED | OUTPATIENT
Start: 2019-01-17 | End: 2019-01-17 | Stop reason: HOSPADM

## 2019-01-17 RX ORDER — CLOPIDOGREL BISULFATE 75 MG/1
75 TABLET ORAL NIGHTLY
Status: DISCONTINUED | OUTPATIENT
Start: 2019-01-17 | End: 2019-01-18 | Stop reason: HOSPADM

## 2019-01-17 RX ORDER — VANCOMYCIN HYDROCHLORIDE 1 G/20ML
1 INJECTION, POWDER, LYOPHILIZED, FOR SOLUTION INTRAVENOUS ONCE
Status: COMPLETED | OUTPATIENT
Start: 2019-01-17 | End: 2019-01-17

## 2019-01-17 RX ORDER — HYDROMORPHONE HCL 110MG/55ML
0.5 PATIENT CONTROLLED ANALGESIA SYRINGE INTRAVENOUS EVERY 5 MIN PRN
Status: DISCONTINUED | OUTPATIENT
Start: 2019-01-17 | End: 2019-01-17 | Stop reason: HOSPADM

## 2019-01-17 RX ORDER — ASPIRIN 81 MG/1
81 TABLET, CHEWABLE ORAL DAILY
Status: DISCONTINUED | OUTPATIENT
Start: 2019-01-18 | End: 2019-01-17 | Stop reason: SDUPTHER

## 2019-01-17 RX ORDER — PROPOFOL 10 MG/ML
INJECTION, EMULSION INTRAVENOUS PRN
Status: DISCONTINUED | OUTPATIENT
Start: 2019-01-17 | End: 2019-01-17 | Stop reason: SDUPTHER

## 2019-01-17 RX ORDER — LIDOCAINE HYDROCHLORIDE 20 MG/ML
INJECTION, SOLUTION INFILTRATION; PERINEURAL PRN
Status: DISCONTINUED | OUTPATIENT
Start: 2019-01-17 | End: 2019-01-17 | Stop reason: SDUPTHER

## 2019-01-17 RX ORDER — OXYCODONE HYDROCHLORIDE AND ACETAMINOPHEN 5; 325 MG/1; MG/1
1 TABLET ORAL PRN
Status: DISCONTINUED | OUTPATIENT
Start: 2019-01-17 | End: 2019-01-17 | Stop reason: HOSPADM

## 2019-01-17 RX ORDER — MAGNESIUM HYDROXIDE 1200 MG/15ML
LIQUID ORAL CONTINUOUS PRN
Status: COMPLETED | OUTPATIENT
Start: 2019-01-17 | End: 2019-01-17

## 2019-01-17 RX ORDER — VANCOMYCIN HYDROCHLORIDE 500 MG/10ML
INJECTION, POWDER, LYOPHILIZED, FOR SOLUTION INTRAVENOUS
Status: DISCONTINUED
Start: 2019-01-17 | End: 2019-01-18

## 2019-01-17 RX ORDER — BUPIVACAINE HYDROCHLORIDE 5 MG/ML
INJECTION, SOLUTION PERINEURAL PRN
Status: DISCONTINUED | OUTPATIENT
Start: 2019-01-17 | End: 2019-01-17 | Stop reason: HOSPADM

## 2019-01-17 RX ORDER — HEPARIN SODIUM 5000 [USP'U]/ML
INJECTION, SOLUTION INTRAVENOUS; SUBCUTANEOUS PRN
Status: DISCONTINUED | OUTPATIENT
Start: 2019-01-17 | End: 2019-01-17 | Stop reason: HOSPADM

## 2019-01-17 RX ORDER — HYDRALAZINE HYDROCHLORIDE 20 MG/ML
5 INJECTION INTRAMUSCULAR; INTRAVENOUS EVERY 10 MIN PRN
Status: DISCONTINUED | OUTPATIENT
Start: 2019-01-17 | End: 2019-01-17 | Stop reason: HOSPADM

## 2019-01-17 RX ORDER — DEXTROSE MONOHYDRATE 50 MG/ML
INJECTION, SOLUTION INTRAVENOUS
Status: COMPLETED
Start: 2019-01-17 | End: 2019-01-17

## 2019-01-17 RX ADMIN — INSULIN GLARGINE 25 UNITS: 100 INJECTION, SOLUTION SUBCUTANEOUS at 20:48

## 2019-01-17 RX ADMIN — ZINC OXIDE: 200 OINTMENT TOPICAL at 16:25

## 2019-01-17 RX ADMIN — Medication 2000 MG: at 20:31

## 2019-01-17 RX ADMIN — FERROUS SULFATE TAB 325 MG (65 MG ELEMENTAL FE) 650 MG: 325 (65 FE) TAB at 20:31

## 2019-01-17 RX ADMIN — APIXABAN 5 MG: 5 TABLET, FILM COATED ORAL at 20:30

## 2019-01-17 RX ADMIN — VANCOMYCIN HYDROCHLORIDE 1 G: 1 INJECTION, POWDER, LYOPHILIZED, FOR SOLUTION INTRAVENOUS at 13:15

## 2019-01-17 RX ADMIN — TRAZODONE HYDROCHLORIDE 25 MG: 50 TABLET ORAL at 20:30

## 2019-01-17 RX ADMIN — INSULIN LISPRO 2 UNITS: 100 INJECTION, SOLUTION INTRAVENOUS; SUBCUTANEOUS at 16:25

## 2019-01-17 RX ADMIN — ASPIRIN 81 MG 81 MG: 81 TABLET ORAL at 20:31

## 2019-01-17 RX ADMIN — MICONAZOLE NITRATE: 2 POWDER TOPICAL at 16:24

## 2019-01-17 RX ADMIN — TORSEMIDE 20 MG: 20 TABLET ORAL at 20:31

## 2019-01-17 RX ADMIN — LIDOCAINE HYDROCHLORIDE 40 MG: 20 INJECTION, SOLUTION INFILTRATION; PERINEURAL at 13:17

## 2019-01-17 RX ADMIN — SODIUM CHLORIDE, POTASSIUM CHLORIDE, SODIUM LACTATE AND CALCIUM CHLORIDE: 600; 310; 30; 20 INJECTION, SOLUTION INTRAVENOUS at 13:17

## 2019-01-17 RX ADMIN — SODIUM CHLORIDE, POTASSIUM CHLORIDE, SODIUM LACTATE AND CALCIUM CHLORIDE: 600; 310; 30; 20 INJECTION, SOLUTION INTRAVENOUS at 12:46

## 2019-01-17 RX ADMIN — METOPROLOL SUCCINATE 100 MG: 50 TABLET, EXTENDED RELEASE ORAL at 20:31

## 2019-01-17 RX ADMIN — PROPOFOL 200 MG: 10 INJECTION, EMULSION INTRAVENOUS at 13:25

## 2019-01-17 RX ADMIN — ROSUVASTATIN CALCIUM 20 MG: 10 TABLET, FILM COATED ORAL at 20:31

## 2019-01-17 RX ADMIN — METFORMIN HYDROCHLORIDE 1000 MG: 500 TABLET ORAL at 18:21

## 2019-01-17 RX ADMIN — PROPOFOL 200 MG: 10 INJECTION, EMULSION INTRAVENOUS at 13:17

## 2019-01-17 RX ADMIN — SENNOSIDES 17.2 MG: 8.6 TABLET, FILM COATED ORAL at 20:32

## 2019-01-17 RX ADMIN — CLOPIDOGREL BISULFATE 75 MG: 75 TABLET ORAL at 20:31

## 2019-01-17 RX ADMIN — OXYCODONE HYDROCHLORIDE 5 MG: 5 TABLET ORAL at 20:37

## 2019-01-17 RX ADMIN — DEXTROSE MONOHYDRATE: 5 INJECTION, SOLUTION INTRAVENOUS at 13:15

## 2019-01-17 RX ADMIN — TORSEMIDE 20 MG: 20 TABLET ORAL at 00:04

## 2019-01-17 ASSESSMENT — PULMONARY FUNCTION TESTS
PIF_VALUE: 0

## 2019-01-17 ASSESSMENT — PAIN SCALES - GENERAL: PAINLEVEL_OUTOF10: 7

## 2019-01-18 ENCOUNTER — TELEPHONE (OUTPATIENT)
Dept: INTERNAL MEDICINE CLINIC | Age: 54
End: 2019-01-18

## 2019-01-18 VITALS
TEMPERATURE: 97.8 F | WEIGHT: 250 LBS | OXYGEN SATURATION: 99 % | HEIGHT: 74 IN | SYSTOLIC BLOOD PRESSURE: 95 MMHG | RESPIRATION RATE: 18 BRPM | HEART RATE: 84 BPM | DIASTOLIC BLOOD PRESSURE: 64 MMHG | BODY MASS INDEX: 32.08 KG/M2

## 2019-01-18 LAB
BASOPHILS ABSOLUTE: 0.2 K/UL (ref 0–0.2)
BASOPHILS RELATIVE PERCENT: 1.5 %
EOSINOPHILS ABSOLUTE: 0 K/UL (ref 0–0.6)
EOSINOPHILS RELATIVE PERCENT: 0.3 %
GLUCOSE BLD-MCNC: 175 MG/DL (ref 70–99)
GLUCOSE BLD-MCNC: 234 MG/DL (ref 70–99)
GLUCOSE BLD-MCNC: 235 MG/DL (ref 70–99)
HCT VFR BLD CALC: 25.1 % (ref 40.5–52.5)
HEMOGLOBIN: 7.6 G/DL (ref 13.5–17.5)
LYMPHOCYTES ABSOLUTE: 1.2 K/UL (ref 1–5.1)
LYMPHOCYTES RELATIVE PERCENT: 9 %
MCH RBC QN AUTO: 25.1 PG (ref 26–34)
MCHC RBC AUTO-ENTMCNC: 30.3 G/DL (ref 31–36)
MCV RBC AUTO: 82.7 FL (ref 80–100)
MONOCYTES ABSOLUTE: 1 K/UL (ref 0–1.3)
MONOCYTES RELATIVE PERCENT: 7.1 %
NEUTROPHILS ABSOLUTE: 11.2 K/UL (ref 1.7–7.7)
NEUTROPHILS RELATIVE PERCENT: 82.1 %
PDW BLD-RTO: 24.5 % (ref 12.4–15.4)
PERFORMED ON: ABNORMAL
PLATELET # BLD: 408 K/UL (ref 135–450)
PMV BLD AUTO: 8.2 FL (ref 5–10.5)
RBC # BLD: 3.03 M/UL (ref 4.2–5.9)
WBC # BLD: 13.7 K/UL (ref 4–11)

## 2019-01-18 PROCEDURE — 85025 COMPLETE CBC W/AUTO DIFF WBC: CPT

## 2019-01-18 PROCEDURE — 2580000003 HC RX 258: Performed by: INTERNAL MEDICINE

## 2019-01-18 PROCEDURE — 6370000000 HC RX 637 (ALT 250 FOR IP): Performed by: INTERNAL MEDICINE

## 2019-01-18 PROCEDURE — 36415 COLL VENOUS BLD VENIPUNCTURE: CPT

## 2019-01-18 PROCEDURE — 99232 SBSQ HOSP IP/OBS MODERATE 35: CPT | Performed by: INTERNAL MEDICINE

## 2019-01-18 PROCEDURE — 6360000002 HC RX W HCPCS: Performed by: SURGERY

## 2019-01-18 PROCEDURE — 6360000002 HC RX W HCPCS: Performed by: INTERNAL MEDICINE

## 2019-01-18 PROCEDURE — 6370000000 HC RX 637 (ALT 250 FOR IP): Performed by: SURGERY

## 2019-01-18 PROCEDURE — 99024 POSTOP FOLLOW-UP VISIT: CPT | Performed by: NURSE PRACTITIONER

## 2019-01-18 RX ORDER — TORSEMIDE 20 MG/1
40 TABLET ORAL NIGHTLY
Status: DISCONTINUED | OUTPATIENT
Start: 2019-01-18 | End: 2019-01-18 | Stop reason: HOSPADM

## 2019-01-18 RX ORDER — TORSEMIDE 20 MG/1
40 TABLET ORAL NIGHTLY
Status: DISCONTINUED | OUTPATIENT
Start: 2019-01-18 | End: 2019-01-18

## 2019-01-18 RX ADMIN — INSULIN GLARGINE 25 UNITS: 100 INJECTION, SOLUTION SUBCUTANEOUS at 07:55

## 2019-01-18 RX ADMIN — IRON SUCROSE 200 MG: 20 INJECTION, SOLUTION INTRAVENOUS at 08:58

## 2019-01-18 RX ADMIN — PANTOPRAZOLE SODIUM 40 MG: 40 TABLET, DELAYED RELEASE ORAL at 06:09

## 2019-01-18 RX ADMIN — Medication 1600 MG: at 07:52

## 2019-01-18 RX ADMIN — OXYCODONE HYDROCHLORIDE 5 MG: 5 TABLET ORAL at 02:38

## 2019-01-18 RX ADMIN — DOCUSATE SODIUM 200 MG: 100 CAPSULE, LIQUID FILLED ORAL at 07:52

## 2019-01-18 RX ADMIN — INSULIN LISPRO 2 UNITS: 100 INJECTION, SOLUTION INTRAVENOUS; SUBCUTANEOUS at 11:54

## 2019-01-18 RX ADMIN — ZINC OXIDE: 200 OINTMENT TOPICAL at 07:53

## 2019-01-18 RX ADMIN — INSULIN LISPRO 2 UNITS: 100 INJECTION, SOLUTION INTRAVENOUS; SUBCUTANEOUS at 07:55

## 2019-01-18 RX ADMIN — PROMETHAZINE HYDROCHLORIDE 25 MG: 25 TABLET ORAL at 13:22

## 2019-01-18 RX ADMIN — FERROUS SULFATE TAB 325 MG (65 MG ELEMENTAL FE) 325 MG: 325 (65 FE) TAB at 07:52

## 2019-01-18 RX ADMIN — METFORMIN HYDROCHLORIDE 1000 MG: 500 TABLET ORAL at 07:52

## 2019-01-18 RX ADMIN — APIXABAN 5 MG: 5 TABLET, FILM COATED ORAL at 07:52

## 2019-01-18 ASSESSMENT — PAIN SCALES - GENERAL: PAINLEVEL_OUTOF10: 7

## 2019-01-19 ENCOUNTER — CARE COORDINATION (OUTPATIENT)
Dept: CASE MANAGEMENT | Age: 54
End: 2019-01-19

## 2019-01-19 DIAGNOSIS — E11.621 DIABETIC FOOT ULCER WITH OSTEOMYELITIS (HCC): Primary | ICD-10-CM

## 2019-01-19 DIAGNOSIS — L97.509 DIABETIC FOOT ULCER WITH OSTEOMYELITIS (HCC): Primary | ICD-10-CM

## 2019-01-19 DIAGNOSIS — E11.69 DIABETIC FOOT ULCER WITH OSTEOMYELITIS (HCC): Primary | ICD-10-CM

## 2019-01-19 DIAGNOSIS — M86.9 DIABETIC FOOT ULCER WITH OSTEOMYELITIS (HCC): Primary | ICD-10-CM

## 2019-01-19 PROCEDURE — 1111F DSCHRG MED/CURRENT MED MERGE: CPT

## 2019-01-21 ENCOUNTER — TELEPHONE (OUTPATIENT)
Dept: INTERNAL MEDICINE CLINIC | Age: 54
End: 2019-01-21

## 2019-01-23 ENCOUNTER — HOSPITAL ENCOUNTER (OUTPATIENT)
Dept: WOUND CARE | Age: 54
Discharge: HOME | DRG: 853 | End: 2019-01-23
Payer: MEDICARE

## 2019-01-23 VITALS
TEMPERATURE: 96.6 F | DIASTOLIC BLOOD PRESSURE: 69 MMHG | SYSTOLIC BLOOD PRESSURE: 110 MMHG | RESPIRATION RATE: 18 BRPM | BODY MASS INDEX: 32.1 KG/M2 | HEIGHT: 74 IN | HEART RATE: 75 BPM

## 2019-01-23 DIAGNOSIS — L89.214 PRESSURE ULCER OF RIGHT HIP, STAGE 4 (HCC): Primary | ICD-10-CM

## 2019-01-23 PROCEDURE — 11043 DBRDMT MUSC&/FSCA 1ST 20/<: CPT | Performed by: INTERNAL MEDICINE

## 2019-01-23 PROCEDURE — 97598 DBRDMT OPN WND ADDL 20CM/<: CPT

## 2019-01-23 PROCEDURE — 29445 APPL RIGID TOT CNTC LEG CAST: CPT

## 2019-01-23 PROCEDURE — 97597 DBRDMT OPN WND 1ST 20 CM/<: CPT | Performed by: INTERNAL MEDICINE

## 2019-01-23 PROCEDURE — 97598 DBRDMT OPN WND ADDL 20CM/<: CPT | Performed by: INTERNAL MEDICINE

## 2019-01-23 PROCEDURE — 97597 DBRDMT OPN WND 1ST 20 CM/<: CPT

## 2019-01-23 PROCEDURE — 11043 DBRDMT MUSC&/FSCA 1ST 20/<: CPT

## 2019-01-23 PROCEDURE — 17250 CHEM CAUT OF GRANLTJ TISSUE: CPT | Performed by: INTERNAL MEDICINE

## 2019-01-23 PROCEDURE — 29445 APPL RIGID TOT CNTC LEG CAST: CPT | Performed by: INTERNAL MEDICINE

## 2019-01-23 PROCEDURE — 0KBW0ZZ EXCISION OF LEFT FOOT MUSCLE, OPEN APPROACH: ICD-10-PCS | Performed by: INTERNAL MEDICINE

## 2019-01-23 PROCEDURE — 17250 CHEM CAUT OF GRANLTJ TISSUE: CPT

## 2019-01-23 PROCEDURE — 0HB8XZZ EXCISION OF BUTTOCK SKIN, EXTERNAL APPROACH: ICD-10-PCS | Performed by: INTERNAL MEDICINE

## 2019-01-23 RX ORDER — LIDOCAINE 40 MG/G
CREAM TOPICAL ONCE
Status: DISCONTINUED | OUTPATIENT
Start: 2019-01-23 | End: 2019-01-24 | Stop reason: HOSPADM

## 2019-01-23 ASSESSMENT — PAIN DESCRIPTION - DESCRIPTORS: DESCRIPTORS: ACHING;BURNING;STABBING

## 2019-01-23 ASSESSMENT — PAIN DESCRIPTION - FREQUENCY: FREQUENCY: CONTINUOUS

## 2019-01-23 ASSESSMENT — PAIN DESCRIPTION - LOCATION: LOCATION: NECK;SHOULDER

## 2019-01-23 ASSESSMENT — PAIN SCALES - GENERAL: PAINLEVEL_OUTOF10: 3

## 2019-01-23 ASSESSMENT — PAIN DESCRIPTION - PAIN TYPE: TYPE: CHRONIC PAIN

## 2019-01-23 ASSESSMENT — PAIN DESCRIPTION - ONSET: ONSET: ON-GOING

## 2019-01-23 ASSESSMENT — PAIN DESCRIPTION - PROGRESSION: CLINICAL_PROGRESSION: NOT CHANGED

## 2019-01-23 ASSESSMENT — PAIN - FUNCTIONAL ASSESSMENT: PAIN_FUNCTIONAL_ASSESSMENT: ACTIVITIES ARE NOT PREVENTED

## 2019-01-23 ASSESSMENT — PAIN DESCRIPTION - ORIENTATION: ORIENTATION: LEFT

## 2019-01-24 ENCOUNTER — CARE COORDINATION (OUTPATIENT)
Dept: CASE MANAGEMENT | Age: 54
End: 2019-01-24

## 2019-01-24 PROBLEM — E11.621 DIABETIC FOOT ULCER WITH OSTEOMYELITIS (HCC): Status: RESOLVED | Noted: 2019-01-15 | Resolved: 2019-01-24

## 2019-01-24 PROBLEM — E11.69 DIABETIC FOOT ULCER WITH OSTEOMYELITIS (HCC): Status: RESOLVED | Noted: 2019-01-15 | Resolved: 2019-01-24

## 2019-01-24 PROBLEM — L89.894 PRESSURE ULCER OF RIGHT FOOT, STAGE 4 (HCC): Status: RESOLVED | Noted: 2017-11-02 | Resolved: 2019-01-24

## 2019-01-24 PROBLEM — L97.509 DIABETIC FOOT ULCER WITH OSTEOMYELITIS (HCC): Status: RESOLVED | Noted: 2019-01-15 | Resolved: 2019-01-24

## 2019-01-24 PROBLEM — M86.9 DIABETIC FOOT ULCER WITH OSTEOMYELITIS (HCC): Status: RESOLVED | Noted: 2019-01-15 | Resolved: 2019-01-24

## 2019-01-25 ENCOUNTER — HOSPITAL ENCOUNTER (INPATIENT)
Age: 54
LOS: 3 days | Discharge: HOME HEALTH | DRG: 853 | End: 2019-01-28
Attending: EMERGENCY MEDICINE | Admitting: INTERNAL MEDICINE
Payer: MEDICARE

## 2019-01-25 ENCOUNTER — TELEPHONE (OUTPATIENT)
Dept: OTHER | Facility: CLINIC | Age: 54
End: 2019-01-25

## 2019-01-25 ENCOUNTER — APPOINTMENT (OUTPATIENT)
Dept: GENERAL RADIOLOGY | Age: 54
DRG: 853 | End: 2019-01-25
Payer: MEDICARE

## 2019-01-25 ENCOUNTER — APPOINTMENT (OUTPATIENT)
Dept: CT IMAGING | Age: 54
DRG: 853 | End: 2019-01-25
Payer: MEDICARE

## 2019-01-25 DIAGNOSIS — R06.00 DYSPNEA, UNSPECIFIED TYPE: Primary | ICD-10-CM

## 2019-01-25 DIAGNOSIS — D72.828 OTHER ELEVATED WHITE BLOOD CELL (WBC) COUNT: ICD-10-CM

## 2019-01-25 DIAGNOSIS — R50.9 FEVER, UNSPECIFIED FEVER CAUSE: ICD-10-CM

## 2019-01-25 PROBLEM — A41.9 SEPSIS (HCC): Status: ACTIVE | Noted: 2019-01-25

## 2019-01-25 LAB
A/G RATIO: 0.6 (ref 1.1–2.2)
ALBUMIN SERPL-MCNC: 2.8 G/DL (ref 3.4–5)
ALP BLD-CCNC: 167 U/L (ref 40–129)
ALT SERPL-CCNC: 7 U/L (ref 10–40)
ANION GAP SERPL CALCULATED.3IONS-SCNC: 17 MMOL/L (ref 3–16)
ANISOCYTOSIS: ABNORMAL
AST SERPL-CCNC: 22 U/L (ref 15–37)
BACTERIA: ABNORMAL /HPF
BANDED NEUTROPHILS RELATIVE PERCENT: 4 % (ref 0–7)
BASOPHILS ABSOLUTE: 0 K/UL (ref 0–0.2)
BASOPHILS RELATIVE PERCENT: 0 %
BILIRUB SERPL-MCNC: 0.5 MG/DL (ref 0–1)
BILIRUBIN URINE: NEGATIVE
BLOOD, URINE: ABNORMAL
BUN BLDV-MCNC: 23 MG/DL (ref 7–20)
CALCIUM SERPL-MCNC: 8.4 MG/DL (ref 8.3–10.6)
CHLORIDE BLD-SCNC: 93 MMOL/L (ref 99–110)
CLARITY: ABNORMAL
CO2: 22 MMOL/L (ref 21–32)
COLOR: YELLOW
CREAT SERPL-MCNC: 0.8 MG/DL (ref 0.9–1.3)
EKG ATRIAL RATE: 116 BPM
EKG DIAGNOSIS: NORMAL
EKG P AXIS: 37 DEGREES
EKG P-R INTERVAL: 152 MS
EKG Q-T INTERVAL: 350 MS
EKG QRS DURATION: 78 MS
EKG QTC CALCULATION (BAZETT): 486 MS
EKG R AXIS: 90 DEGREES
EKG T AXIS: -5 DEGREES
EKG VENTRICULAR RATE: 116 BPM
EOSINOPHILS ABSOLUTE: 0 K/UL (ref 0–0.6)
EOSINOPHILS RELATIVE PERCENT: 0 %
GFR AFRICAN AMERICAN: >60
GFR NON-AFRICAN AMERICAN: >60
GLOBULIN: 4.5 G/DL
GLUCOSE BLD-MCNC: 158 MG/DL (ref 70–99)
GLUCOSE BLD-MCNC: 164 MG/DL (ref 70–99)
GLUCOSE BLD-MCNC: 200 MG/DL (ref 70–99)
GLUCOSE URINE: NEGATIVE MG/DL
HCT VFR BLD CALC: 28.1 % (ref 40.5–52.5)
HEMOGLOBIN: 8.7 G/DL (ref 13.5–17.5)
KETONES, URINE: NEGATIVE MG/DL
LACTIC ACID, SEPSIS: 1.3 MMOL/L (ref 0.4–1.9)
LACTIC ACID: 2.3 MMOL/L (ref 0.4–2)
LACTIC ACID: 2.4 MMOL/L (ref 0.4–2)
LEUKOCYTE ESTERASE, URINE: ABNORMAL
LYMPHOCYTES ABSOLUTE: 0.8 K/UL (ref 1–5.1)
LYMPHOCYTES RELATIVE PERCENT: 3 %
MCH RBC QN AUTO: 25.3 PG (ref 26–34)
MCHC RBC AUTO-ENTMCNC: 30.9 G/DL (ref 31–36)
MCV RBC AUTO: 81.9 FL (ref 80–100)
MICROSCOPIC EXAMINATION: YES
MONOCYTES ABSOLUTE: 0.8 K/UL (ref 0–1.3)
MONOCYTES RELATIVE PERCENT: 3 %
MUCUS: ABNORMAL /LPF
NEUTROPHILS ABSOLUTE: 26 K/UL (ref 1.7–7.7)
NEUTROPHILS RELATIVE PERCENT: 90 %
NITRITE, URINE: NEGATIVE
OVALOCYTES: ABNORMAL
PDW BLD-RTO: 24 % (ref 12.4–15.4)
PERFORMED ON: ABNORMAL
PERFORMED ON: ABNORMAL
PH UA: 7
PLATELET # BLD: 466 K/UL (ref 135–450)
PLATELET SLIDE REVIEW: ABNORMAL
PMV BLD AUTO: 8.5 FL (ref 5–10.5)
POIKILOCYTES: ABNORMAL
POLYCHROMASIA: ABNORMAL
POTASSIUM SERPL-SCNC: 4.7 MMOL/L (ref 3.5–5.1)
PROTEIN UA: 100 MG/DL
RAPID INFLUENZA  B AGN: NEGATIVE
RAPID INFLUENZA A AGN: NEGATIVE
RBC # BLD: 3.43 M/UL (ref 4.2–5.9)
RBC UA: ABNORMAL /HPF (ref 0–2)
SLIDE REVIEW: ABNORMAL
SODIUM BLD-SCNC: 132 MMOL/L (ref 136–145)
SPECIFIC GRAVITY UA: 1.01
STOMATOCYTES: ABNORMAL
TEAR DROP CELLS: ABNORMAL
TOTAL PROTEIN: 7.3 G/DL (ref 6.4–8.2)
TROPONIN: 0.11 NG/ML
URINE REFLEX TO CULTURE: YES
URINE TYPE: ABNORMAL
UROBILINOGEN, URINE: 0.2 E.U./DL
WBC # BLD: 27.7 K/UL (ref 4–11)
WBC UA: ABNORMAL /HPF (ref 0–5)

## 2019-01-25 PROCEDURE — 36415 COLL VENOUS BLD VENIPUNCTURE: CPT

## 2019-01-25 PROCEDURE — 6370000000 HC RX 637 (ALT 250 FOR IP): Performed by: PHYSICIAN ASSISTANT

## 2019-01-25 PROCEDURE — 83605 ASSAY OF LACTIC ACID: CPT

## 2019-01-25 PROCEDURE — 81001 URINALYSIS AUTO W/SCOPE: CPT

## 2019-01-25 PROCEDURE — 80053 COMPREHEN METABOLIC PANEL: CPT

## 2019-01-25 PROCEDURE — 6370000000 HC RX 637 (ALT 250 FOR IP): Performed by: EMERGENCY MEDICINE

## 2019-01-25 PROCEDURE — 94640 AIRWAY INHALATION TREATMENT: CPT

## 2019-01-25 PROCEDURE — 6360000002 HC RX W HCPCS: Performed by: PHYSICIAN ASSISTANT

## 2019-01-25 PROCEDURE — 84484 ASSAY OF TROPONIN QUANT: CPT

## 2019-01-25 PROCEDURE — 99285 EMERGENCY DEPT VISIT HI MDM: CPT

## 2019-01-25 PROCEDURE — 2580000003 HC RX 258: Performed by: PHYSICIAN ASSISTANT

## 2019-01-25 PROCEDURE — 87804 INFLUENZA ASSAY W/OPTIC: CPT

## 2019-01-25 PROCEDURE — 96361 HYDRATE IV INFUSION ADD-ON: CPT

## 2019-01-25 PROCEDURE — 87186 SC STD MICRODIL/AGAR DIL: CPT

## 2019-01-25 PROCEDURE — 94761 N-INVAS EAR/PLS OXIMETRY MLT: CPT

## 2019-01-25 PROCEDURE — 2W3RX2Z IMMOBILIZATION OF LEFT LOWER LEG USING CAST: ICD-10-PCS | Performed by: INTERNAL MEDICINE

## 2019-01-25 PROCEDURE — 85025 COMPLETE CBC W/AUTO DIFF WBC: CPT

## 2019-01-25 PROCEDURE — 6360000002 HC RX W HCPCS: Performed by: INTERNAL MEDICINE

## 2019-01-25 PROCEDURE — 2580000003 HC RX 258: Performed by: EMERGENCY MEDICINE

## 2019-01-25 PROCEDURE — 96374 THER/PROPH/DIAG INJ IV PUSH: CPT

## 2019-01-25 PROCEDURE — 87040 BLOOD CULTURE FOR BACTERIA: CPT

## 2019-01-25 PROCEDURE — 6360000004 HC RX CONTRAST MEDICATION: Performed by: EMERGENCY MEDICINE

## 2019-01-25 PROCEDURE — 87077 CULTURE AEROBIC IDENTIFY: CPT

## 2019-01-25 PROCEDURE — 2700000000 HC OXYGEN THERAPY PER DAY

## 2019-01-25 PROCEDURE — 93010 ELECTROCARDIOGRAM REPORT: CPT | Performed by: INTERNAL MEDICINE

## 2019-01-25 PROCEDURE — 87086 URINE CULTURE/COLONY COUNT: CPT

## 2019-01-25 PROCEDURE — 1200000000 HC SEMI PRIVATE

## 2019-01-25 PROCEDURE — 6360000002 HC RX W HCPCS: Performed by: EMERGENCY MEDICINE

## 2019-01-25 PROCEDURE — 93005 ELECTROCARDIOGRAM TRACING: CPT | Performed by: EMERGENCY MEDICINE

## 2019-01-25 PROCEDURE — 94150 VITAL CAPACITY TEST: CPT

## 2019-01-25 PROCEDURE — 29445 APPL RIGID TOT CNTC LEG CAST: CPT | Performed by: INTERNAL MEDICINE

## 2019-01-25 PROCEDURE — 71260 CT THORAX DX C+: CPT

## 2019-01-25 PROCEDURE — 94664 DEMO&/EVAL PT USE INHALER: CPT

## 2019-01-25 RX ORDER — IPRATROPIUM BROMIDE AND ALBUTEROL SULFATE 2.5; .5 MG/3ML; MG/3ML
1 SOLUTION RESPIRATORY (INHALATION) ONCE
Status: COMPLETED | OUTPATIENT
Start: 2019-01-25 | End: 2019-01-25

## 2019-01-25 RX ORDER — ASPIRIN 81 MG/1
81 TABLET, CHEWABLE ORAL DAILY
Status: DISCONTINUED | OUTPATIENT
Start: 2019-01-26 | End: 2019-01-28 | Stop reason: HOSPADM

## 2019-01-25 RX ORDER — METOPROLOL SUCCINATE 50 MG/1
100 TABLET, EXTENDED RELEASE ORAL NIGHTLY
Status: DISCONTINUED | OUTPATIENT
Start: 2019-01-25 | End: 2019-01-28 | Stop reason: HOSPADM

## 2019-01-25 RX ORDER — SODIUM CHLORIDE 0.9 % (FLUSH) 0.9 %
10 SYRINGE (ML) INJECTION PRN
Status: DISCONTINUED | OUTPATIENT
Start: 2019-01-25 | End: 2019-01-28 | Stop reason: HOSPADM

## 2019-01-25 RX ORDER — PANTOPRAZOLE SODIUM 40 MG/1
40 TABLET, DELAYED RELEASE ORAL DAILY
Status: DISCONTINUED | OUTPATIENT
Start: 2019-01-25 | End: 2019-01-28 | Stop reason: HOSPADM

## 2019-01-25 RX ORDER — PROMETHAZINE HYDROCHLORIDE 25 MG/ML
12.5 INJECTION, SOLUTION INTRAMUSCULAR; INTRAVENOUS ONCE
Status: COMPLETED | OUTPATIENT
Start: 2019-01-25 | End: 2019-01-25

## 2019-01-25 RX ORDER — METOCLOPRAMIDE 10 MG/1
10 TABLET ORAL 3 TIMES DAILY PRN
Status: DISCONTINUED | OUTPATIENT
Start: 2019-01-25 | End: 2019-01-28 | Stop reason: HOSPADM

## 2019-01-25 RX ORDER — IPRATROPIUM BROMIDE AND ALBUTEROL SULFATE 2.5; .5 MG/3ML; MG/3ML
1 SOLUTION RESPIRATORY (INHALATION)
Status: DISCONTINUED | OUTPATIENT
Start: 2019-01-25 | End: 2019-01-25

## 2019-01-25 RX ORDER — ACETAMINOPHEN 325 MG/1
650 TABLET ORAL EVERY 8 HOURS PRN
Status: DISCONTINUED | OUTPATIENT
Start: 2019-01-25 | End: 2019-01-28 | Stop reason: HOSPADM

## 2019-01-25 RX ORDER — CLOPIDOGREL BISULFATE 75 MG/1
75 TABLET ORAL NIGHTLY
Status: DISCONTINUED | OUTPATIENT
Start: 2019-01-25 | End: 2019-01-28 | Stop reason: HOSPADM

## 2019-01-25 RX ORDER — FERROUS SULFATE 325(65) MG
325 TABLET ORAL
Status: DISCONTINUED | OUTPATIENT
Start: 2019-01-26 | End: 2019-01-28 | Stop reason: HOSPADM

## 2019-01-25 RX ORDER — IPRATROPIUM BROMIDE AND ALBUTEROL SULFATE 2.5; .5 MG/3ML; MG/3ML
1 SOLUTION RESPIRATORY (INHALATION) 3 TIMES DAILY
Status: DISCONTINUED | OUTPATIENT
Start: 2019-01-25 | End: 2019-01-28 | Stop reason: HOSPADM

## 2019-01-25 RX ORDER — DEXTROSE MONOHYDRATE 50 MG/ML
100 INJECTION, SOLUTION INTRAVENOUS PRN
Status: DISCONTINUED | OUTPATIENT
Start: 2019-01-25 | End: 2019-01-28 | Stop reason: HOSPADM

## 2019-01-25 RX ORDER — NICOTINE POLACRILEX 4 MG
15 LOZENGE BUCCAL PRN
Status: DISCONTINUED | OUTPATIENT
Start: 2019-01-25 | End: 2019-01-28 | Stop reason: HOSPADM

## 2019-01-25 RX ORDER — DEXTROSE MONOHYDRATE 25 G/50ML
12.5 INJECTION, SOLUTION INTRAVENOUS PRN
Status: DISCONTINUED | OUTPATIENT
Start: 2019-01-25 | End: 2019-01-28 | Stop reason: HOSPADM

## 2019-01-25 RX ORDER — CYCLOBENZAPRINE HCL 10 MG
10 TABLET ORAL 2 TIMES DAILY PRN
Status: DISCONTINUED | OUTPATIENT
Start: 2019-01-25 | End: 2019-01-28 | Stop reason: HOSPADM

## 2019-01-25 RX ORDER — SODIUM CHLORIDE 0.9 % (FLUSH) 0.9 %
10 SYRINGE (ML) INJECTION EVERY 12 HOURS SCHEDULED
Status: DISCONTINUED | OUTPATIENT
Start: 2019-01-25 | End: 2019-01-28 | Stop reason: HOSPADM

## 2019-01-25 RX ORDER — INSULIN GLARGINE 100 [IU]/ML
50 INJECTION, SOLUTION SUBCUTANEOUS 2 TIMES DAILY
Status: DISCONTINUED | OUTPATIENT
Start: 2019-01-25 | End: 2019-01-28 | Stop reason: HOSPADM

## 2019-01-25 RX ORDER — 0.9 % SODIUM CHLORIDE 0.9 %
30 INTRAVENOUS SOLUTION INTRAVENOUS ONCE
Status: COMPLETED | OUTPATIENT
Start: 2019-01-25 | End: 2019-01-25

## 2019-01-25 RX ORDER — SODIUM CHLORIDE 9 MG/ML
INJECTION, SOLUTION INTRAVENOUS CONTINUOUS
Status: DISCONTINUED | OUTPATIENT
Start: 2019-01-25 | End: 2019-01-26

## 2019-01-25 RX ORDER — DOCUSATE SODIUM 100 MG/1
200 CAPSULE, LIQUID FILLED ORAL 2 TIMES DAILY PRN
Status: DISCONTINUED | OUTPATIENT
Start: 2019-01-25 | End: 2019-01-28 | Stop reason: HOSPADM

## 2019-01-25 RX ORDER — TORSEMIDE 20 MG/1
40 TABLET ORAL NIGHTLY
Status: DISCONTINUED | OUTPATIENT
Start: 2019-01-25 | End: 2019-01-28 | Stop reason: HOSPADM

## 2019-01-25 RX ORDER — TRAZODONE HYDROCHLORIDE 50 MG/1
25 TABLET ORAL NIGHTLY
Status: DISCONTINUED | OUTPATIENT
Start: 2019-01-25 | End: 2019-01-28 | Stop reason: HOSPADM

## 2019-01-25 RX ADMIN — IPRATROPIUM BROMIDE AND ALBUTEROL SULFATE 1 AMPULE: .5; 3 SOLUTION RESPIRATORY (INHALATION) at 12:38

## 2019-01-25 RX ADMIN — METOPROLOL SUCCINATE 100 MG: 50 TABLET, EXTENDED RELEASE ORAL at 22:43

## 2019-01-25 RX ADMIN — IOPAMIDOL 85 ML: 755 INJECTION, SOLUTION INTRAVENOUS at 13:09

## 2019-01-25 RX ADMIN — INSULIN GLARGINE 50 UNITS: 100 INJECTION, SOLUTION SUBCUTANEOUS at 22:44

## 2019-01-25 RX ADMIN — ACETAMINOPHEN 650 MG: 325 TABLET ORAL at 18:48

## 2019-01-25 RX ADMIN — IPRATROPIUM BROMIDE AND ALBUTEROL SULFATE 1 AMPULE: .5; 3 SOLUTION RESPIRATORY (INHALATION) at 19:52

## 2019-01-25 RX ADMIN — APIXABAN 5 MG: 5 TABLET, FILM COATED ORAL at 22:42

## 2019-01-25 RX ADMIN — INSULIN LISPRO 1 UNITS: 100 INJECTION, SOLUTION INTRAVENOUS; SUBCUTANEOUS at 22:45

## 2019-01-25 RX ADMIN — METFORMIN HYDROCHLORIDE 1000 MG: 500 TABLET ORAL at 18:48

## 2019-01-25 RX ADMIN — CLOPIDOGREL BISULFATE 75 MG: 75 TABLET ORAL at 22:43

## 2019-01-25 RX ADMIN — MEROPENEM 1 G: 1 INJECTION, POWDER, FOR SOLUTION INTRAVENOUS at 18:48

## 2019-01-25 RX ADMIN — PANTOPRAZOLE SODIUM 40 MG: 40 TABLET, DELAYED RELEASE ORAL at 18:47

## 2019-01-25 RX ADMIN — SODIUM CHLORIDE 3402 ML: 9 INJECTION, SOLUTION INTRAVENOUS at 13:53

## 2019-01-25 RX ADMIN — Medication 400 MG: at 22:42

## 2019-01-25 RX ADMIN — SODIUM CHLORIDE: 9 INJECTION, SOLUTION INTRAVENOUS at 18:46

## 2019-01-25 RX ADMIN — METOCLOPRAMIDE 10 MG: 10 TABLET ORAL at 18:48

## 2019-01-25 RX ADMIN — VANCOMYCIN HYDROCHLORIDE 1000 MG: 1 INJECTION, POWDER, LYOPHILIZED, FOR SOLUTION INTRAVENOUS at 15:23

## 2019-01-25 RX ADMIN — TORSEMIDE 40 MG: 20 TABLET ORAL at 22:43

## 2019-01-25 RX ADMIN — PROMETHAZINE HYDROCHLORIDE 12.5 MG: 25 INJECTION INTRAMUSCULAR; INTRAVENOUS at 16:53

## 2019-01-25 RX ADMIN — TRAZODONE HYDROCHLORIDE 25 MG: 100 TABLET ORAL at 22:42

## 2019-01-25 ASSESSMENT — PAIN SCALES - GENERAL
PAINLEVEL_OUTOF10: 8
PAINLEVEL_OUTOF10: 6

## 2019-01-25 ASSESSMENT — PAIN DESCRIPTION - DESCRIPTORS: DESCRIPTORS: ACHING

## 2019-01-25 ASSESSMENT — PAIN DESCRIPTION - PROGRESSION: CLINICAL_PROGRESSION: NOT CHANGED

## 2019-01-25 ASSESSMENT — PAIN DESCRIPTION - FREQUENCY: FREQUENCY: CONTINUOUS

## 2019-01-25 ASSESSMENT — PAIN DESCRIPTION - LOCATION
LOCATION: HEAD
LOCATION: HEAD

## 2019-01-25 ASSESSMENT — PAIN DESCRIPTION - PAIN TYPE
TYPE: ACUTE PAIN
TYPE: ACUTE PAIN

## 2019-01-25 ASSESSMENT — PAIN DESCRIPTION - ONSET: ONSET: GRADUAL

## 2019-01-26 ENCOUNTER — APPOINTMENT (OUTPATIENT)
Dept: GENERAL RADIOLOGY | Age: 54
DRG: 853 | End: 2019-01-26
Payer: MEDICARE

## 2019-01-26 LAB
ANION GAP SERPL CALCULATED.3IONS-SCNC: 14 MMOL/L (ref 3–16)
BASOPHILS ABSOLUTE: 0.1 K/UL (ref 0–0.2)
BASOPHILS RELATIVE PERCENT: 0.6 %
BUN BLDV-MCNC: 21 MG/DL (ref 7–20)
CALCIUM SERPL-MCNC: 8.2 MG/DL (ref 8.3–10.6)
CHLORIDE BLD-SCNC: 98 MMOL/L (ref 99–110)
CO2: 23 MMOL/L (ref 21–32)
CREAT SERPL-MCNC: 0.7 MG/DL (ref 0.9–1.3)
EOSINOPHILS ABSOLUTE: 0.1 K/UL (ref 0–0.6)
EOSINOPHILS RELATIVE PERCENT: 1 %
GFR AFRICAN AMERICAN: >60
GFR NON-AFRICAN AMERICAN: >60
GLUCOSE BLD-MCNC: 149 MG/DL (ref 70–99)
GLUCOSE BLD-MCNC: 152 MG/DL (ref 70–99)
GLUCOSE BLD-MCNC: 176 MG/DL (ref 70–99)
GLUCOSE BLD-MCNC: 232 MG/DL (ref 70–99)
GLUCOSE BLD-MCNC: 242 MG/DL (ref 70–99)
HCT VFR BLD CALC: 28.6 % (ref 40.5–52.5)
HEMOGLOBIN: 8.4 G/DL (ref 13.5–17.5)
LYMPHOCYTES ABSOLUTE: 0.7 K/UL (ref 1–5.1)
LYMPHOCYTES RELATIVE PERCENT: 6.7 %
MAGNESIUM: 1.5 MG/DL (ref 1.8–2.4)
MCH RBC QN AUTO: 25.9 PG (ref 26–34)
MCHC RBC AUTO-ENTMCNC: 29.5 G/DL (ref 31–36)
MCV RBC AUTO: 87.8 FL (ref 80–100)
MONOCYTES ABSOLUTE: 0.6 K/UL (ref 0–1.3)
MONOCYTES RELATIVE PERCENT: 5.3 %
NEUTROPHILS ABSOLUTE: 9.4 K/UL (ref 1.7–7.7)
NEUTROPHILS RELATIVE PERCENT: 86.4 %
PDW BLD-RTO: 23.6 % (ref 12.4–15.4)
PERFORMED ON: ABNORMAL
PLATELET # BLD: 350 K/UL (ref 135–450)
PMV BLD AUTO: 8.2 FL (ref 5–10.5)
POTASSIUM REFLEX MAGNESIUM: 3.5 MMOL/L (ref 3.5–5.1)
RBC # BLD: 3.26 M/UL (ref 4.2–5.9)
SODIUM BLD-SCNC: 135 MMOL/L (ref 136–145)
WBC # BLD: 10.8 K/UL (ref 4–11)

## 2019-01-26 PROCEDURE — 99222 1ST HOSP IP/OBS MODERATE 55: CPT | Performed by: SURGERY

## 2019-01-26 PROCEDURE — 94761 N-INVAS EAR/PLS OXIMETRY MLT: CPT

## 2019-01-26 PROCEDURE — 6370000000 HC RX 637 (ALT 250 FOR IP): Performed by: INTERNAL MEDICINE

## 2019-01-26 PROCEDURE — 2580000003 HC RX 258: Performed by: PHYSICIAN ASSISTANT

## 2019-01-26 PROCEDURE — 36415 COLL VENOUS BLD VENIPUNCTURE: CPT

## 2019-01-26 PROCEDURE — 2700000000 HC OXYGEN THERAPY PER DAY

## 2019-01-26 PROCEDURE — 71045 X-RAY EXAM CHEST 1 VIEW: CPT

## 2019-01-26 PROCEDURE — 6360000002 HC RX W HCPCS: Performed by: PHYSICIAN ASSISTANT

## 2019-01-26 PROCEDURE — 1200000000 HC SEMI PRIVATE

## 2019-01-26 PROCEDURE — 2500000003 HC RX 250 WO HCPCS: Performed by: PHYSICIAN ASSISTANT

## 2019-01-26 PROCEDURE — 6360000002 HC RX W HCPCS: Performed by: INTERNAL MEDICINE

## 2019-01-26 PROCEDURE — 85025 COMPLETE CBC W/AUTO DIFF WBC: CPT

## 2019-01-26 PROCEDURE — 83735 ASSAY OF MAGNESIUM: CPT

## 2019-01-26 PROCEDURE — 94640 AIRWAY INHALATION TREATMENT: CPT

## 2019-01-26 PROCEDURE — 80048 BASIC METABOLIC PNL TOTAL CA: CPT

## 2019-01-26 PROCEDURE — 51702 INSERT TEMP BLADDER CATH: CPT

## 2019-01-26 PROCEDURE — 6370000000 HC RX 637 (ALT 250 FOR IP): Performed by: PHYSICIAN ASSISTANT

## 2019-01-26 RX ORDER — MAGNESIUM SULFATE IN WATER 40 MG/ML
4 INJECTION, SOLUTION INTRAVENOUS ONCE
Status: COMPLETED | OUTPATIENT
Start: 2019-01-26 | End: 2019-01-26

## 2019-01-26 RX ADMIN — INSULIN LISPRO 2 UNITS: 100 INJECTION, SOLUTION INTRAVENOUS; SUBCUTANEOUS at 17:08

## 2019-01-26 RX ADMIN — FERROUS SULFATE TAB 325 MG (65 MG ELEMENTAL FE) 325 MG: 325 (65 FE) TAB at 08:27

## 2019-01-26 RX ADMIN — CLOPIDOGREL BISULFATE 75 MG: 75 TABLET ORAL at 21:43

## 2019-01-26 RX ADMIN — INSULIN GLARGINE 50 UNITS: 100 INJECTION, SOLUTION SUBCUTANEOUS at 08:29

## 2019-01-26 RX ADMIN — Medication 10 ML: at 08:28

## 2019-01-26 RX ADMIN — Medication 2000 MG: at 21:45

## 2019-01-26 RX ADMIN — IPRATROPIUM BROMIDE AND ALBUTEROL SULFATE 1 AMPULE: .5; 3 SOLUTION RESPIRATORY (INHALATION) at 20:38

## 2019-01-26 RX ADMIN — INSULIN LISPRO 1 UNITS: 100 INJECTION, SOLUTION INTRAVENOUS; SUBCUTANEOUS at 11:48

## 2019-01-26 RX ADMIN — MEROPENEM 1 G: 1 INJECTION, POWDER, FOR SOLUTION INTRAVENOUS at 17:07

## 2019-01-26 RX ADMIN — INSULIN LISPRO 1 UNITS: 100 INJECTION, SOLUTION INTRAVENOUS; SUBCUTANEOUS at 21:51

## 2019-01-26 RX ADMIN — IPRATROPIUM BROMIDE AND ALBUTEROL SULFATE 1 AMPULE: .5; 3 SOLUTION RESPIRATORY (INHALATION) at 07:17

## 2019-01-26 RX ADMIN — PANTOPRAZOLE SODIUM 40 MG: 40 TABLET, DELAYED RELEASE ORAL at 08:27

## 2019-01-26 RX ADMIN — SODIUM CHLORIDE: 9 INJECTION, SOLUTION INTRAVENOUS at 03:25

## 2019-01-26 RX ADMIN — INSULIN GLARGINE 50 UNITS: 100 INJECTION, SOLUTION SUBCUTANEOUS at 21:51

## 2019-01-26 RX ADMIN — METOPROLOL SUCCINATE 100 MG: 50 TABLET, EXTENDED RELEASE ORAL at 21:43

## 2019-01-26 RX ADMIN — MAGNESIUM SULFATE IN WATER 4 G: 40 INJECTION, SOLUTION INTRAVENOUS at 09:53

## 2019-01-26 RX ADMIN — Medication 400 MG: at 08:27

## 2019-01-26 RX ADMIN — VANCOMYCIN HYDROCHLORIDE 1000 MG: 1 INJECTION, POWDER, LYOPHILIZED, FOR SOLUTION INTRAVENOUS at 00:20

## 2019-01-26 RX ADMIN — TORSEMIDE 40 MG: 20 TABLET ORAL at 21:43

## 2019-01-26 RX ADMIN — APIXABAN 5 MG: 5 TABLET, FILM COATED ORAL at 08:27

## 2019-01-26 RX ADMIN — MEROPENEM 1 G: 1 INJECTION, POWDER, FOR SOLUTION INTRAVENOUS at 09:53

## 2019-01-26 RX ADMIN — VANCOMYCIN HYDROCHLORIDE 1000 MG: 1 INJECTION, POWDER, LYOPHILIZED, FOR SOLUTION INTRAVENOUS at 08:27

## 2019-01-26 RX ADMIN — MEROPENEM 1 G: 1 INJECTION, POWDER, FOR SOLUTION INTRAVENOUS at 03:24

## 2019-01-26 RX ADMIN — ASPIRIN 81 MG 81 MG: 81 TABLET ORAL at 08:27

## 2019-01-26 RX ADMIN — TRAZODONE HYDROCHLORIDE 25 MG: 100 TABLET ORAL at 21:44

## 2019-01-26 RX ADMIN — APIXABAN 5 MG: 5 TABLET, FILM COATED ORAL at 21:45

## 2019-01-26 RX ADMIN — VANCOMYCIN HYDROCHLORIDE 1000 MG: 1 INJECTION, POWDER, LYOPHILIZED, FOR SOLUTION INTRAVENOUS at 16:33

## 2019-01-26 RX ADMIN — IPRATROPIUM BROMIDE AND ALBUTEROL SULFATE 1 AMPULE: .5; 3 SOLUTION RESPIRATORY (INHALATION) at 15:07

## 2019-01-26 RX ADMIN — MICONAZOLE NITRATE: 2 POWDER TOPICAL at 08:29

## 2019-01-27 LAB
ANION GAP SERPL CALCULATED.3IONS-SCNC: 13 MMOL/L (ref 3–16)
BUN BLDV-MCNC: 24 MG/DL (ref 7–20)
CALCIUM SERPL-MCNC: 8.6 MG/DL (ref 8.3–10.6)
CHLORIDE BLD-SCNC: 91 MMOL/L (ref 99–110)
CO2: 23 MMOL/L (ref 21–32)
CREAT SERPL-MCNC: 0.9 MG/DL (ref 0.9–1.3)
GFR AFRICAN AMERICAN: >60
GFR NON-AFRICAN AMERICAN: >60
GLUCOSE BLD-MCNC: 179 MG/DL (ref 70–99)
GLUCOSE BLD-MCNC: 206 MG/DL (ref 70–99)
GLUCOSE BLD-MCNC: 267 MG/DL (ref 70–99)
GLUCOSE BLD-MCNC: 282 MG/DL (ref 70–99)
GLUCOSE BLD-MCNC: 305 MG/DL (ref 70–99)
MAGNESIUM: 1.9 MG/DL (ref 1.8–2.4)
ORGANISM: ABNORMAL
PERFORMED ON: ABNORMAL
POTASSIUM REFLEX MAGNESIUM: 3.3 MMOL/L (ref 3.5–5.1)
SODIUM BLD-SCNC: 127 MMOL/L (ref 136–145)
URINE CULTURE, ROUTINE: ABNORMAL
VANCOMYCIN TROUGH: 28.6 UG/ML (ref 10–20)
VANCOMYCIN TROUGH: 31.9 UG/ML (ref 10–20)

## 2019-01-27 PROCEDURE — 80048 BASIC METABOLIC PNL TOTAL CA: CPT

## 2019-01-27 PROCEDURE — 6370000000 HC RX 637 (ALT 250 FOR IP): Performed by: INTERNAL MEDICINE

## 2019-01-27 PROCEDURE — 1200000000 HC SEMI PRIVATE

## 2019-01-27 PROCEDURE — 6370000000 HC RX 637 (ALT 250 FOR IP): Performed by: PHYSICIAN ASSISTANT

## 2019-01-27 PROCEDURE — 94640 AIRWAY INHALATION TREATMENT: CPT

## 2019-01-27 PROCEDURE — 94761 N-INVAS EAR/PLS OXIMETRY MLT: CPT

## 2019-01-27 PROCEDURE — 2700000000 HC OXYGEN THERAPY PER DAY

## 2019-01-27 PROCEDURE — 83735 ASSAY OF MAGNESIUM: CPT

## 2019-01-27 PROCEDURE — 36415 COLL VENOUS BLD VENIPUNCTURE: CPT

## 2019-01-27 PROCEDURE — 6360000002 HC RX W HCPCS: Performed by: PHYSICIAN ASSISTANT

## 2019-01-27 PROCEDURE — 2580000003 HC RX 258: Performed by: PHYSICIAN ASSISTANT

## 2019-01-27 PROCEDURE — 80202 ASSAY OF VANCOMYCIN: CPT

## 2019-01-27 RX ORDER — SENNA AND DOCUSATE SODIUM 50; 8.6 MG/1; MG/1
2 TABLET, FILM COATED ORAL DAILY
Status: DISCONTINUED | OUTPATIENT
Start: 2019-01-27 | End: 2019-01-28 | Stop reason: HOSPADM

## 2019-01-27 RX ORDER — POTASSIUM CHLORIDE 20 MEQ/1
40 TABLET, EXTENDED RELEASE ORAL 2 TIMES DAILY
Status: COMPLETED | OUTPATIENT
Start: 2019-01-27 | End: 2019-01-27

## 2019-01-27 RX ORDER — FERROUS SULFATE 325(65) MG
650 TABLET ORAL NIGHTLY
Status: DISCONTINUED | OUTPATIENT
Start: 2019-01-27 | End: 2019-01-28 | Stop reason: HOSPADM

## 2019-01-27 RX ORDER — POLYETHYLENE GLYCOL 3350 17 G/17G
17 POWDER, FOR SOLUTION ORAL DAILY PRN
Status: DISCONTINUED | OUTPATIENT
Start: 2019-01-27 | End: 2019-01-28 | Stop reason: HOSPADM

## 2019-01-27 RX ADMIN — IPRATROPIUM BROMIDE AND ALBUTEROL SULFATE 1 AMPULE: .5; 3 SOLUTION RESPIRATORY (INHALATION) at 19:20

## 2019-01-27 RX ADMIN — Medication 10 ML: at 21:27

## 2019-01-27 RX ADMIN — INSULIN LISPRO 2 UNITS: 100 INJECTION, SOLUTION INTRAVENOUS; SUBCUTANEOUS at 21:43

## 2019-01-27 RX ADMIN — INSULIN GLARGINE 50 UNITS: 100 INJECTION, SOLUTION SUBCUTANEOUS at 21:44

## 2019-01-27 RX ADMIN — PANTOPRAZOLE SODIUM 40 MG: 40 TABLET, DELAYED RELEASE ORAL at 08:39

## 2019-01-27 RX ADMIN — TORSEMIDE 40 MG: 20 TABLET ORAL at 21:31

## 2019-01-27 RX ADMIN — MEROPENEM 1 G: 1 INJECTION, POWDER, FOR SOLUTION INTRAVENOUS at 08:38

## 2019-01-27 RX ADMIN — Medication 1600 MG: at 08:39

## 2019-01-27 RX ADMIN — VANCOMYCIN HYDROCHLORIDE 1000 MG: 1 INJECTION, POWDER, LYOPHILIZED, FOR SOLUTION INTRAVENOUS at 00:11

## 2019-01-27 RX ADMIN — Medication 2000 MG: at 21:31

## 2019-01-27 RX ADMIN — STANDARDIZED SENNA CONCENTRATE AND DOCUSATE SODIUM 2 TABLET: 8.6; 5 TABLET, FILM COATED ORAL at 11:04

## 2019-01-27 RX ADMIN — INSULIN LISPRO 1 UNITS: 100 INJECTION, SOLUTION INTRAVENOUS; SUBCUTANEOUS at 08:43

## 2019-01-27 RX ADMIN — MEROPENEM 1 G: 1 INJECTION, POWDER, FOR SOLUTION INTRAVENOUS at 16:50

## 2019-01-27 RX ADMIN — FERROUS SULFATE TAB 325 MG (65 MG ELEMENTAL FE) 325 MG: 325 (65 FE) TAB at 08:39

## 2019-01-27 RX ADMIN — METOPROLOL SUCCINATE 100 MG: 50 TABLET, EXTENDED RELEASE ORAL at 21:30

## 2019-01-27 RX ADMIN — TRAZODONE HYDROCHLORIDE 25 MG: 100 TABLET ORAL at 21:31

## 2019-01-27 RX ADMIN — POLYETHYLENE GLYCOL 3350 17 G: 17 POWDER, FOR SOLUTION ORAL at 21:43

## 2019-01-27 RX ADMIN — INSULIN LISPRO 3 UNITS: 100 INJECTION, SOLUTION INTRAVENOUS; SUBCUTANEOUS at 16:51

## 2019-01-27 RX ADMIN — Medication 10 ML: at 08:39

## 2019-01-27 RX ADMIN — DOCUSATE SODIUM 200 MG: 100 CAPSULE, LIQUID FILLED ORAL at 21:43

## 2019-01-27 RX ADMIN — IPRATROPIUM BROMIDE AND ALBUTEROL SULFATE 1 AMPULE: .5; 3 SOLUTION RESPIRATORY (INHALATION) at 13:54

## 2019-01-27 RX ADMIN — INSULIN GLARGINE 50 UNITS: 100 INJECTION, SOLUTION SUBCUTANEOUS at 08:40

## 2019-01-27 RX ADMIN — POTASSIUM CHLORIDE 40 MEQ: 20 TABLET, EXTENDED RELEASE ORAL at 21:31

## 2019-01-27 RX ADMIN — APIXABAN 5 MG: 5 TABLET, FILM COATED ORAL at 08:39

## 2019-01-27 RX ADMIN — APIXABAN 5 MG: 5 TABLET, FILM COATED ORAL at 21:31

## 2019-01-27 RX ADMIN — ASPIRIN 81 MG 81 MG: 81 TABLET ORAL at 08:39

## 2019-01-27 RX ADMIN — POTASSIUM CHLORIDE 40 MEQ: 20 TABLET, EXTENDED RELEASE ORAL at 11:30

## 2019-01-27 RX ADMIN — MEROPENEM 1 G: 1 INJECTION, POWDER, FOR SOLUTION INTRAVENOUS at 02:23

## 2019-01-27 RX ADMIN — IPRATROPIUM BROMIDE AND ALBUTEROL SULFATE 1 AMPULE: .5; 3 SOLUTION RESPIRATORY (INHALATION) at 07:10

## 2019-01-27 RX ADMIN — MICONAZOLE NITRATE: 2 POWDER TOPICAL at 08:39

## 2019-01-27 RX ADMIN — INSULIN LISPRO 3 UNITS: 100 INJECTION, SOLUTION INTRAVENOUS; SUBCUTANEOUS at 11:25

## 2019-01-27 RX ADMIN — POLYETHYLENE GLYCOL 3350 17 G: 17 POWDER, FOR SOLUTION ORAL at 11:05

## 2019-01-27 RX ADMIN — DOCUSATE SODIUM 200 MG: 100 CAPSULE, LIQUID FILLED ORAL at 11:04

## 2019-01-27 RX ADMIN — CLOPIDOGREL BISULFATE 75 MG: 75 TABLET ORAL at 21:31

## 2019-01-27 RX ADMIN — FERROUS SULFATE TAB 325 MG (65 MG ELEMENTAL FE) 650 MG: 325 (65 FE) TAB at 21:30

## 2019-01-28 VITALS
WEIGHT: 250 LBS | BODY MASS INDEX: 32.08 KG/M2 | DIASTOLIC BLOOD PRESSURE: 74 MMHG | RESPIRATION RATE: 18 BRPM | TEMPERATURE: 96.5 F | HEART RATE: 88 BPM | OXYGEN SATURATION: 98 % | HEIGHT: 74 IN | SYSTOLIC BLOOD PRESSURE: 116 MMHG

## 2019-01-28 LAB
ANION GAP SERPL CALCULATED.3IONS-SCNC: 15 MMOL/L (ref 3–16)
BUN BLDV-MCNC: 25 MG/DL (ref 7–20)
CALCIUM SERPL-MCNC: 9 MG/DL (ref 8.3–10.6)
CHLORIDE BLD-SCNC: 93 MMOL/L (ref 99–110)
CO2: 24 MMOL/L (ref 21–32)
CREAT SERPL-MCNC: 0.7 MG/DL (ref 0.9–1.3)
GFR AFRICAN AMERICAN: >60
GFR NON-AFRICAN AMERICAN: >60
GLUCOSE BLD-MCNC: 109 MG/DL (ref 70–99)
GLUCOSE BLD-MCNC: 117 MG/DL (ref 70–99)
GLUCOSE BLD-MCNC: 150 MG/DL (ref 70–99)
MAGNESIUM: 1.7 MG/DL (ref 1.8–2.4)
PERFORMED ON: ABNORMAL
PERFORMED ON: ABNORMAL
POTASSIUM REFLEX MAGNESIUM: 3.6 MMOL/L (ref 3.5–5.1)
SODIUM BLD-SCNC: 132 MMOL/L (ref 136–145)
VANCOMYCIN TROUGH: 23.8 UG/ML (ref 10–20)

## 2019-01-28 PROCEDURE — 80202 ASSAY OF VANCOMYCIN: CPT

## 2019-01-28 PROCEDURE — 94640 AIRWAY INHALATION TREATMENT: CPT

## 2019-01-28 PROCEDURE — 36415 COLL VENOUS BLD VENIPUNCTURE: CPT

## 2019-01-28 PROCEDURE — 6360000002 HC RX W HCPCS: Performed by: PHYSICIAN ASSISTANT

## 2019-01-28 PROCEDURE — 2580000003 HC RX 258: Performed by: PHYSICIAN ASSISTANT

## 2019-01-28 PROCEDURE — 6370000000 HC RX 637 (ALT 250 FOR IP): Performed by: INTERNAL MEDICINE

## 2019-01-28 PROCEDURE — 80048 BASIC METABOLIC PNL TOTAL CA: CPT

## 2019-01-28 PROCEDURE — 94761 N-INVAS EAR/PLS OXIMETRY MLT: CPT

## 2019-01-28 PROCEDURE — 6370000000 HC RX 637 (ALT 250 FOR IP): Performed by: PHYSICIAN ASSISTANT

## 2019-01-28 PROCEDURE — 83735 ASSAY OF MAGNESIUM: CPT

## 2019-01-28 PROCEDURE — 99239 HOSP IP/OBS DSCHRG MGMT >30: CPT | Performed by: INTERNAL MEDICINE

## 2019-01-28 RX ORDER — CIPROFLOXACIN 500 MG/1
500 TABLET, FILM COATED ORAL 2 TIMES DAILY
Qty: 20 TABLET | Refills: 0 | Status: SHIPPED | OUTPATIENT
Start: 2019-01-28 | End: 2019-02-07

## 2019-01-28 RX ORDER — SODIUM PHOSPHATE,MONO-DIBASIC 19G-7G/118
1 ENEMA (ML) RECTAL
Status: DISCONTINUED | OUTPATIENT
Start: 2019-01-28 | End: 2019-01-28

## 2019-01-28 RX ADMIN — MEROPENEM 1 G: 1 INJECTION, POWDER, FOR SOLUTION INTRAVENOUS at 03:15

## 2019-01-28 RX ADMIN — Medication 10 ML: at 08:36

## 2019-01-28 RX ADMIN — METFORMIN HYDROCHLORIDE 1000 MG: 500 TABLET ORAL at 08:43

## 2019-01-28 RX ADMIN — IPRATROPIUM BROMIDE AND ALBUTEROL SULFATE 1 AMPULE: .5; 3 SOLUTION RESPIRATORY (INHALATION) at 07:45

## 2019-01-28 RX ADMIN — ASPIRIN 81 MG 81 MG: 81 TABLET ORAL at 08:31

## 2019-01-28 RX ADMIN — APIXABAN 5 MG: 5 TABLET, FILM COATED ORAL at 08:31

## 2019-01-28 RX ADMIN — INSULIN GLARGINE 50 UNITS: 100 INJECTION, SOLUTION SUBCUTANEOUS at 08:32

## 2019-01-28 RX ADMIN — INSULIN LISPRO 1 UNITS: 100 INJECTION, SOLUTION INTRAVENOUS; SUBCUTANEOUS at 12:17

## 2019-01-28 RX ADMIN — FERROUS SULFATE TAB 325 MG (65 MG ELEMENTAL FE) 325 MG: 325 (65 FE) TAB at 08:42

## 2019-01-28 RX ADMIN — STANDARDIZED SENNA CONCENTRATE AND DOCUSATE SODIUM 2 TABLET: 8.6; 5 TABLET, FILM COATED ORAL at 08:31

## 2019-01-28 RX ADMIN — Medication 1600 MG: at 08:31

## 2019-01-28 RX ADMIN — PANTOPRAZOLE SODIUM 40 MG: 40 TABLET, DELAYED RELEASE ORAL at 08:31

## 2019-01-28 RX ADMIN — DOCUSATE SODIUM 200 MG: 100 CAPSULE, LIQUID FILLED ORAL at 08:34

## 2019-01-28 RX ADMIN — MEROPENEM 1 G: 1 INJECTION, POWDER, FOR SOLUTION INTRAVENOUS at 10:24

## 2019-01-28 ASSESSMENT — PAIN SCALES - GENERAL: PAINLEVEL_OUTOF10: 0

## 2019-01-29 ENCOUNTER — CARE COORDINATION (OUTPATIENT)
Dept: CASE MANAGEMENT | Age: 54
End: 2019-01-29

## 2019-01-30 ENCOUNTER — HOSPITAL ENCOUNTER (OUTPATIENT)
Dept: WOUND CARE | Age: 54
Discharge: HOME | End: 2019-01-30
Payer: MEDICARE

## 2019-01-30 VITALS
HEART RATE: 80 BPM | DIASTOLIC BLOOD PRESSURE: 76 MMHG | TEMPERATURE: 96.6 F | RESPIRATION RATE: 18 BRPM | SYSTOLIC BLOOD PRESSURE: 99 MMHG

## 2019-01-30 DIAGNOSIS — T81.31XA SURGICAL WOUND DEHISCENCE, INITIAL ENCOUNTER: ICD-10-CM

## 2019-01-30 LAB
BLOOD CULTURE, ROUTINE: NORMAL
CULTURE, BLOOD 2: NORMAL

## 2019-01-30 PROCEDURE — 17250 CHEM CAUT OF GRANLTJ TISSUE: CPT

## 2019-01-30 PROCEDURE — 97597 DBRDMT OPN WND 1ST 20 CM/<: CPT

## 2019-01-30 PROCEDURE — 97597 DBRDMT OPN WND 1ST 20 CM/<: CPT | Performed by: INTERNAL MEDICINE

## 2019-01-30 PROCEDURE — 29445 APPL RIGID TOT CNTC LEG CAST: CPT

## 2019-01-30 PROCEDURE — 97598 DBRDMT OPN WND ADDL 20CM/<: CPT | Performed by: INTERNAL MEDICINE

## 2019-01-30 PROCEDURE — 11043 DBRDMT MUSC&/FSCA 1ST 20/<: CPT

## 2019-01-30 PROCEDURE — 97598 DBRDMT OPN WND ADDL 20CM/<: CPT

## 2019-01-30 PROCEDURE — 11043 DBRDMT MUSC&/FSCA 1ST 20/<: CPT | Performed by: INTERNAL MEDICINE

## 2019-01-30 PROCEDURE — 17250 CHEM CAUT OF GRANLTJ TISSUE: CPT | Performed by: INTERNAL MEDICINE

## 2019-01-30 RX ORDER — LIDOCAINE 40 MG/G
CREAM TOPICAL ONCE
Status: DISCONTINUED | OUTPATIENT
Start: 2019-01-30 | End: 2019-01-31 | Stop reason: HOSPADM

## 2019-02-01 ENCOUNTER — CARE COORDINATION (OUTPATIENT)
Dept: CASE MANAGEMENT | Age: 54
End: 2019-02-01

## 2019-02-06 ENCOUNTER — APPOINTMENT (OUTPATIENT)
Dept: GENERAL RADIOLOGY | Age: 54
End: 2019-02-06
Payer: MEDICARE

## 2019-02-06 ENCOUNTER — HOSPITAL ENCOUNTER (OUTPATIENT)
Dept: WOUND CARE | Age: 54
Discharge: HOME | End: 2019-02-06
Payer: MEDICARE

## 2019-02-06 ENCOUNTER — HOSPITAL ENCOUNTER (OUTPATIENT)
Dept: GENERAL RADIOLOGY | Age: 54
Discharge: HOME | End: 2019-02-06
Payer: MEDICARE

## 2019-02-06 VITALS
HEART RATE: 87 BPM | RESPIRATION RATE: 16 BRPM | DIASTOLIC BLOOD PRESSURE: 71 MMHG | TEMPERATURE: 97.1 F | SYSTOLIC BLOOD PRESSURE: 114 MMHG

## 2019-02-06 DIAGNOSIS — L89.214 PRESSURE ULCER OF RIGHT HIP, STAGE 4 (HCC): ICD-10-CM

## 2019-02-06 LAB
GLUCOSE BLD-MCNC: 86 MG/DL (ref 70–99)
PERFORMED ON: NORMAL

## 2019-02-06 PROCEDURE — 20501 NJX SINUS TRACT DIAGNOSTIC: CPT

## 2019-02-06 PROCEDURE — 29445 APPL RIGID TOT CNTC LEG CAST: CPT

## 2019-02-06 PROCEDURE — 29445 APPL RIGID TOT CNTC LEG CAST: CPT | Performed by: INTERNAL MEDICINE

## 2019-02-06 PROCEDURE — 97597 DBRDMT OPN WND 1ST 20 CM/<: CPT | Performed by: INTERNAL MEDICINE

## 2019-02-06 PROCEDURE — 17250 CHEM CAUT OF GRANLTJ TISSUE: CPT

## 2019-02-06 PROCEDURE — 11042 DBRDMT SUBQ TIS 1ST 20SQCM/<: CPT | Performed by: INTERNAL MEDICINE

## 2019-02-06 PROCEDURE — 17250 CHEM CAUT OF GRANLTJ TISSUE: CPT | Performed by: INTERNAL MEDICINE

## 2019-02-06 PROCEDURE — 97597 DBRDMT OPN WND 1ST 20 CM/<: CPT

## 2019-02-06 PROCEDURE — 76080 X-RAY EXAM OF FISTULA: CPT

## 2019-02-06 PROCEDURE — 11042 DBRDMT SUBQ TIS 1ST 20SQCM/<: CPT

## 2019-02-06 PROCEDURE — 97598 DBRDMT OPN WND ADDL 20CM/<: CPT | Performed by: INTERNAL MEDICINE

## 2019-02-06 PROCEDURE — 97598 DBRDMT OPN WND ADDL 20CM/<: CPT

## 2019-02-06 RX ORDER — LIDOCAINE 40 MG/G
CREAM TOPICAL ONCE
Status: DISCONTINUED | OUTPATIENT
Start: 2019-02-06 | End: 2019-02-07 | Stop reason: HOSPADM

## 2019-02-07 PROBLEM — T81.31XA SURGICAL WOUND DEHISCENCE, INITIAL ENCOUNTER: Status: ACTIVE | Noted: 2019-02-07

## 2019-02-07 PROBLEM — A41.9 SEPSIS (HCC): Status: RESOLVED | Noted: 2019-01-25 | Resolved: 2019-02-07

## 2019-02-11 ENCOUNTER — CARE COORDINATION (OUTPATIENT)
Dept: CASE MANAGEMENT | Age: 54
End: 2019-02-11

## 2019-02-13 ENCOUNTER — HOSPITAL ENCOUNTER (OUTPATIENT)
Dept: WOUND CARE | Age: 54
Discharge: HOME | End: 2019-02-13
Payer: MEDICARE

## 2019-02-13 VITALS
RESPIRATION RATE: 18 BRPM | HEART RATE: 79 BPM | TEMPERATURE: 97.2 F | HEIGHT: 74 IN | BODY MASS INDEX: 32.1 KG/M2 | DIASTOLIC BLOOD PRESSURE: 66 MMHG | SYSTOLIC BLOOD PRESSURE: 115 MMHG

## 2019-02-13 PROCEDURE — 97597 DBRDMT OPN WND 1ST 20 CM/<: CPT

## 2019-02-13 PROCEDURE — 11043 DBRDMT MUSC&/FSCA 1ST 20/<: CPT

## 2019-02-13 PROCEDURE — 11043 DBRDMT MUSC&/FSCA 1ST 20/<: CPT | Performed by: INTERNAL MEDICINE

## 2019-02-13 PROCEDURE — 29445 APPL RIGID TOT CNTC LEG CAST: CPT | Performed by: INTERNAL MEDICINE

## 2019-02-13 PROCEDURE — 97598 DBRDMT OPN WND ADDL 20CM/<: CPT

## 2019-02-13 PROCEDURE — 97597 DBRDMT OPN WND 1ST 20 CM/<: CPT | Performed by: INTERNAL MEDICINE

## 2019-02-13 PROCEDURE — 17250 CHEM CAUT OF GRANLTJ TISSUE: CPT

## 2019-02-13 PROCEDURE — 97598 DBRDMT OPN WND ADDL 20CM/<: CPT | Performed by: INTERNAL MEDICINE

## 2019-02-13 PROCEDURE — 17250 CHEM CAUT OF GRANLTJ TISSUE: CPT | Performed by: INTERNAL MEDICINE

## 2019-02-13 PROCEDURE — 29445 APPL RIGID TOT CNTC LEG CAST: CPT

## 2019-02-13 RX ORDER — LIDOCAINE 40 MG/G
CREAM TOPICAL ONCE
Status: DISCONTINUED | OUTPATIENT
Start: 2019-02-13 | End: 2019-02-14 | Stop reason: HOSPADM

## 2019-02-13 RX ORDER — DOCUSATE SODIUM 100 MG/1
100 CAPSULE, LIQUID FILLED ORAL DAILY
COMMUNITY
End: 2019-03-20

## 2019-02-14 ENCOUNTER — OFFICE VISIT (OUTPATIENT)
Dept: SURGERY | Age: 54
End: 2019-02-14

## 2019-02-14 VITALS
DIASTOLIC BLOOD PRESSURE: 74 MMHG | HEIGHT: 74 IN | SYSTOLIC BLOOD PRESSURE: 120 MMHG | WEIGHT: 243 LBS | BODY MASS INDEX: 31.18 KG/M2

## 2019-02-14 DIAGNOSIS — Z09 POSTOP CHECK: Primary | ICD-10-CM

## 2019-02-14 PROCEDURE — 99024 POSTOP FOLLOW-UP VISIT: CPT | Performed by: SURGERY

## 2019-02-20 ENCOUNTER — HOSPITAL ENCOUNTER (OUTPATIENT)
Dept: WOUND CARE | Age: 54
Discharge: HOME | End: 2019-02-20
Payer: MEDICARE

## 2019-02-20 VITALS
TEMPERATURE: 97.4 F | RESPIRATION RATE: 18 BRPM | HEART RATE: 90 BPM | HEIGHT: 74 IN | DIASTOLIC BLOOD PRESSURE: 73 MMHG | BODY MASS INDEX: 31.2 KG/M2 | SYSTOLIC BLOOD PRESSURE: 116 MMHG

## 2019-02-20 PROCEDURE — 15275 SKIN SUB GRAFT FACE/NK/HF/G: CPT | Performed by: INTERNAL MEDICINE

## 2019-02-20 PROCEDURE — 17250 CHEM CAUT OF GRANLTJ TISSUE: CPT | Performed by: INTERNAL MEDICINE

## 2019-02-20 PROCEDURE — 29445 APPL RIGID TOT CNTC LEG CAST: CPT

## 2019-02-20 PROCEDURE — 15275 SKIN SUB GRAFT FACE/NK/HF/G: CPT

## 2019-02-20 PROCEDURE — 17250 CHEM CAUT OF GRANLTJ TISSUE: CPT

## 2019-02-20 RX ORDER — LIDOCAINE 40 MG/G
CREAM TOPICAL ONCE
Status: DISCONTINUED | OUTPATIENT
Start: 2019-02-20 | End: 2019-02-21 | Stop reason: HOSPADM

## 2019-02-20 ASSESSMENT — PAIN DESCRIPTION - ORIENTATION: ORIENTATION: LEFT

## 2019-02-20 ASSESSMENT — PAIN SCALES - GENERAL: PAINLEVEL_OUTOF10: 3

## 2019-02-20 ASSESSMENT — PAIN DESCRIPTION - FREQUENCY: FREQUENCY: CONTINUOUS

## 2019-02-20 ASSESSMENT — PAIN DESCRIPTION - PAIN TYPE: TYPE: CHRONIC PAIN

## 2019-02-20 ASSESSMENT — PAIN DESCRIPTION - DESCRIPTORS: DESCRIPTORS: ACHING;BURNING;STABBING

## 2019-02-20 ASSESSMENT — PAIN DESCRIPTION - LOCATION: LOCATION: NECK;SHOULDER

## 2019-02-20 ASSESSMENT — PAIN - FUNCTIONAL ASSESSMENT: PAIN_FUNCTIONAL_ASSESSMENT: ACTIVITIES ARE NOT PREVENTED

## 2019-02-20 ASSESSMENT — PAIN DESCRIPTION - PROGRESSION: CLINICAL_PROGRESSION: NOT CHANGED

## 2019-02-20 ASSESSMENT — PAIN DESCRIPTION - ONSET: ONSET: ON-GOING

## 2019-02-22 DIAGNOSIS — D63.8 ANEMIA OF CHRONIC DISORDER: ICD-10-CM

## 2019-02-22 DIAGNOSIS — G89.29 CHRONIC BACK PAIN, UNSPECIFIED BACK LOCATION, UNSPECIFIED BACK PAIN LATERALITY: ICD-10-CM

## 2019-02-22 DIAGNOSIS — M54.9 CHRONIC BACK PAIN, UNSPECIFIED BACK LOCATION, UNSPECIFIED BACK PAIN LATERALITY: ICD-10-CM

## 2019-02-22 RX ORDER — TORSEMIDE 20 MG/1
40 TABLET ORAL DAILY
Qty: 90 TABLET | Refills: 1 | Status: SHIPPED | OUTPATIENT
Start: 2019-02-22 | End: 2019-05-20 | Stop reason: SDUPTHER

## 2019-02-22 RX ORDER — OXYCODONE HYDROCHLORIDE 5 MG/1
5 TABLET ORAL DAILY PRN
Qty: 15 TABLET | Refills: 0 | OUTPATIENT
Start: 2019-02-22 | End: 2019-03-01

## 2019-02-22 RX ORDER — FERROUS SULFATE 325(65) MG
TABLET ORAL
Qty: 270 TABLET | Refills: 0 | Status: SHIPPED | OUTPATIENT
Start: 2019-02-22 | End: 2019-06-02 | Stop reason: SDUPTHER

## 2019-02-22 RX ORDER — CLOPIDOGREL BISULFATE 75 MG/1
75 TABLET ORAL NIGHTLY
Qty: 90 TABLET | Refills: 0 | Status: SHIPPED | OUTPATIENT
Start: 2019-02-22 | End: 2019-03-19 | Stop reason: SINTOL

## 2019-02-27 ENCOUNTER — HOSPITAL ENCOUNTER (OUTPATIENT)
Dept: WOUND CARE | Age: 54
Discharge: HOME | End: 2019-02-27
Payer: MEDICARE

## 2019-02-27 VITALS
HEART RATE: 90 BPM | HEIGHT: 74 IN | WEIGHT: 234 LBS | DIASTOLIC BLOOD PRESSURE: 68 MMHG | BODY MASS INDEX: 30.03 KG/M2 | TEMPERATURE: 97.1 F | SYSTOLIC BLOOD PRESSURE: 103 MMHG | RESPIRATION RATE: 18 BRPM

## 2019-02-27 DIAGNOSIS — L89.324 PRESSURE INJURY OF LEFT ISCHIUM, STAGE 4 (HCC): ICD-10-CM

## 2019-02-27 LAB
BASOPHILS ABSOLUTE: 0.1 K/UL (ref 0–0.2)
BASOPHILS RELATIVE PERCENT: 0.8 %
EOSINOPHILS ABSOLUTE: 0.5 K/UL (ref 0–0.6)
EOSINOPHILS RELATIVE PERCENT: 4.2 %
HCT VFR BLD CALC: 29.8 % (ref 40.5–52.5)
HEMOGLOBIN: 9.2 G/DL (ref 13.5–17.5)
LYMPHOCYTES ABSOLUTE: 1.2 K/UL (ref 1–5.1)
LYMPHOCYTES RELATIVE PERCENT: 10.5 %
MCH RBC QN AUTO: 25.2 PG (ref 26–34)
MCHC RBC AUTO-ENTMCNC: 30.7 G/DL (ref 31–36)
MCV RBC AUTO: 82 FL (ref 80–100)
MONOCYTES ABSOLUTE: 0.8 K/UL (ref 0–1.3)
MONOCYTES RELATIVE PERCENT: 6.8 %
NEUTROPHILS ABSOLUTE: 8.8 K/UL (ref 1.7–7.7)
NEUTROPHILS RELATIVE PERCENT: 77.7 %
PDW BLD-RTO: 18.4 % (ref 12.4–15.4)
PLATELET # BLD: 515 K/UL (ref 135–450)
PMV BLD AUTO: 8.4 FL (ref 5–10.5)
RBC # BLD: 3.64 M/UL (ref 4.2–5.9)
WBC # BLD: 11.3 K/UL (ref 4–11)

## 2019-02-27 PROCEDURE — 11042 DBRDMT SUBQ TIS 1ST 20SQCM/<: CPT | Performed by: INTERNAL MEDICINE

## 2019-02-27 PROCEDURE — 15275 SKIN SUB GRAFT FACE/NK/HF/G: CPT

## 2019-02-27 PROCEDURE — 15275 SKIN SUB GRAFT FACE/NK/HF/G: CPT | Performed by: INTERNAL MEDICINE

## 2019-02-27 PROCEDURE — 11045 DBRDMT SUBQ TISS EACH ADDL: CPT | Performed by: INTERNAL MEDICINE

## 2019-02-27 PROCEDURE — 11045 DBRDMT SUBQ TISS EACH ADDL: CPT

## 2019-02-27 PROCEDURE — 29445 APPL RIGID TOT CNTC LEG CAST: CPT

## 2019-02-27 PROCEDURE — 17250 CHEM CAUT OF GRANLTJ TISSUE: CPT | Performed by: INTERNAL MEDICINE

## 2019-02-27 PROCEDURE — 36415 COLL VENOUS BLD VENIPUNCTURE: CPT

## 2019-02-27 PROCEDURE — 11042 DBRDMT SUBQ TIS 1ST 20SQCM/<: CPT

## 2019-02-27 PROCEDURE — 17250 CHEM CAUT OF GRANLTJ TISSUE: CPT

## 2019-02-27 PROCEDURE — 85025 COMPLETE CBC W/AUTO DIFF WBC: CPT

## 2019-02-27 RX ORDER — LIDOCAINE 40 MG/G
CREAM TOPICAL ONCE
Status: DISCONTINUED | OUTPATIENT
Start: 2019-02-27 | End: 2019-02-28 | Stop reason: HOSPADM

## 2019-03-05 PROBLEM — L89.324 PRESSURE INJURY OF LEFT ISCHIUM, STAGE 4 (HCC): Status: ACTIVE | Noted: 2019-03-05

## 2019-03-05 PROBLEM — L89.893 PRESSURE ULCER OF LEFT LEG, STAGE 3 (HCC): Chronic | Status: RESOLVED | Noted: 2018-01-07 | Resolved: 2019-03-05

## 2019-03-06 ENCOUNTER — HOSPITAL ENCOUNTER (OUTPATIENT)
Dept: WOUND CARE | Age: 54
Discharge: HOME | End: 2019-03-06
Payer: MEDICARE

## 2019-03-06 VITALS
BODY MASS INDEX: 30.06 KG/M2 | HEIGHT: 74 IN | SYSTOLIC BLOOD PRESSURE: 106 MMHG | DIASTOLIC BLOOD PRESSURE: 65 MMHG | TEMPERATURE: 97 F | WEIGHT: 234.2 LBS | RESPIRATION RATE: 18 BRPM | HEART RATE: 80 BPM

## 2019-03-06 PROCEDURE — 17250 CHEM CAUT OF GRANLTJ TISSUE: CPT

## 2019-03-06 PROCEDURE — 17250 CHEM CAUT OF GRANLTJ TISSUE: CPT | Performed by: INTERNAL MEDICINE

## 2019-03-06 PROCEDURE — 97597 DBRDMT OPN WND 1ST 20 CM/<: CPT | Performed by: INTERNAL MEDICINE

## 2019-03-06 PROCEDURE — 97598 DBRDMT OPN WND ADDL 20CM/<: CPT | Performed by: INTERNAL MEDICINE

## 2019-03-06 PROCEDURE — 97598 DBRDMT OPN WND ADDL 20CM/<: CPT

## 2019-03-06 PROCEDURE — 29445 APPL RIGID TOT CNTC LEG CAST: CPT | Performed by: INTERNAL MEDICINE

## 2019-03-06 PROCEDURE — 29445 APPL RIGID TOT CNTC LEG CAST: CPT

## 2019-03-06 PROCEDURE — 97597 DBRDMT OPN WND 1ST 20 CM/<: CPT

## 2019-03-06 RX ORDER — LIDOCAINE 40 MG/G
CREAM TOPICAL ONCE
Status: DISCONTINUED | OUTPATIENT
Start: 2019-03-06 | End: 2019-03-07 | Stop reason: HOSPADM

## 2019-03-13 ENCOUNTER — HOSPITAL ENCOUNTER (OUTPATIENT)
Dept: WOUND CARE | Age: 54
Discharge: HOME | DRG: 907 | End: 2019-03-13
Payer: MEDICARE

## 2019-03-13 ENCOUNTER — CARE COORDINATION (OUTPATIENT)
Dept: CARE COORDINATION | Age: 54
End: 2019-03-13

## 2019-03-13 ENCOUNTER — HOSPITAL ENCOUNTER (INPATIENT)
Age: 54
LOS: 1 days | Discharge: HOME | DRG: 907 | End: 2019-03-14
Attending: EMERGENCY MEDICINE | Admitting: HOSPITALIST
Payer: MEDICARE

## 2019-03-13 VITALS
RESPIRATION RATE: 20 BRPM | HEART RATE: 82 BPM | DIASTOLIC BLOOD PRESSURE: 65 MMHG | SYSTOLIC BLOOD PRESSURE: 101 MMHG | TEMPERATURE: 97.6 F

## 2019-03-13 DIAGNOSIS — S71.001A BLEEDING FROM RIGHT HIP WOUND, INITIAL ENCOUNTER: ICD-10-CM

## 2019-03-13 DIAGNOSIS — D62 ANEMIA DUE TO ACUTE BLOOD LOSS: Primary | ICD-10-CM

## 2019-03-13 PROBLEM — D64.9 ANEMIA: Status: ACTIVE | Noted: 2019-03-13

## 2019-03-13 LAB
A/G RATIO: 0.7 (ref 1.1–2.2)
ABO/RH: NORMAL
ALBUMIN SERPL-MCNC: 3.4 G/DL (ref 3.4–5)
ALP BLD-CCNC: 129 U/L (ref 40–129)
ALT SERPL-CCNC: <5 U/L (ref 10–40)
ANION GAP SERPL CALCULATED.3IONS-SCNC: 16 MMOL/L (ref 3–16)
ANTIBODY SCREEN: NORMAL
APTT: 36.2 SEC (ref 26–36)
AST SERPL-CCNC: 12 U/L (ref 15–37)
BASOPHILS ABSOLUTE: 0.2 K/UL (ref 0–0.2)
BASOPHILS RELATIVE PERCENT: 1.1 %
BILIRUB SERPL-MCNC: 0.5 MG/DL (ref 0–1)
BLOOD BANK DISPENSE STATUS: NORMAL
BLOOD BANK PRODUCT CODE: NORMAL
BPU ID: NORMAL
BUN BLDV-MCNC: 45 MG/DL (ref 7–20)
CALCIUM SERPL-MCNC: 9 MG/DL (ref 8.3–10.6)
CHLORIDE BLD-SCNC: 93 MMOL/L (ref 99–110)
CO2: 25 MMOL/L (ref 21–32)
CREAT SERPL-MCNC: 0.9 MG/DL (ref 0.9–1.3)
DESCRIPTION BLOOD BANK: NORMAL
EOSINOPHILS ABSOLUTE: 0.3 K/UL (ref 0–0.6)
EOSINOPHILS RELATIVE PERCENT: 2.1 %
GFR AFRICAN AMERICAN: >60
GFR NON-AFRICAN AMERICAN: >60
GLOBULIN: 4.6 G/DL
GLUCOSE BLD-MCNC: 112 MG/DL (ref 70–99)
GLUCOSE BLD-MCNC: 120 MG/DL (ref 70–99)
HCT VFR BLD CALC: 25.1 % (ref 40.5–52.5)
HEMOGLOBIN: 7.9 G/DL (ref 13.5–17.5)
INR BLD: 1.92 (ref 0.86–1.14)
LYMPHOCYTES ABSOLUTE: 1.3 K/UL (ref 1–5.1)
LYMPHOCYTES RELATIVE PERCENT: 9.2 %
MCH RBC QN AUTO: 25.5 PG (ref 26–34)
MCHC RBC AUTO-ENTMCNC: 31.6 G/DL (ref 31–36)
MCV RBC AUTO: 80.8 FL (ref 80–100)
MONOCYTES ABSOLUTE: 1 K/UL (ref 0–1.3)
MONOCYTES RELATIVE PERCENT: 6.7 %
NEUTROPHILS ABSOLUTE: 11.7 K/UL (ref 1.7–7.7)
NEUTROPHILS RELATIVE PERCENT: 80.9 %
PDW BLD-RTO: 19 % (ref 12.4–15.4)
PERFORMED ON: ABNORMAL
PLATELET # BLD: 473 K/UL (ref 135–450)
PMV BLD AUTO: 8.3 FL (ref 5–10.5)
POTASSIUM SERPL-SCNC: 3.5 MMOL/L (ref 3.5–5.1)
PROTHROMBIN TIME: 21.9 SEC (ref 9.8–13)
RBC # BLD: 3.1 M/UL (ref 4.2–5.9)
SODIUM BLD-SCNC: 134 MMOL/L (ref 136–145)
TOTAL PROTEIN: 8 G/DL (ref 6.4–8.2)
WBC # BLD: 14.4 K/UL (ref 4–11)

## 2019-03-13 PROCEDURE — 85025 COMPLETE CBC W/AUTO DIFF WBC: CPT

## 2019-03-13 PROCEDURE — 96360 HYDRATION IV INFUSION INIT: CPT

## 2019-03-13 PROCEDURE — 11044 DBRDMT BONE 1ST 20 SQ CM/<: CPT | Performed by: INTERNAL MEDICINE

## 2019-03-13 PROCEDURE — 97597 DBRDMT OPN WND 1ST 20 CM/<: CPT | Performed by: INTERNAL MEDICINE

## 2019-03-13 PROCEDURE — P9016 RBC LEUKOCYTES REDUCED: HCPCS

## 2019-03-13 PROCEDURE — 86900 BLOOD TYPING SEROLOGIC ABO: CPT

## 2019-03-13 PROCEDURE — 97598 DBRDMT OPN WND ADDL 20CM/<: CPT

## 2019-03-13 PROCEDURE — 80053 COMPREHEN METABOLIC PANEL: CPT

## 2019-03-13 PROCEDURE — 17250 CHEM CAUT OF GRANLTJ TISSUE: CPT

## 2019-03-13 PROCEDURE — 86901 BLOOD TYPING SEROLOGIC RH(D): CPT

## 2019-03-13 PROCEDURE — 0Y3C3ZZ CONTROL BLEEDING IN RIGHT UPPER LEG, PERCUTANEOUS APPROACH: ICD-10-PCS | Performed by: EMERGENCY MEDICINE

## 2019-03-13 PROCEDURE — 2060000000 HC ICU INTERMEDIATE R&B

## 2019-03-13 PROCEDURE — 97598 DBRDMT OPN WND ADDL 20CM/<: CPT | Performed by: INTERNAL MEDICINE

## 2019-03-13 PROCEDURE — 2580000003 HC RX 258: Performed by: EMERGENCY MEDICINE

## 2019-03-13 PROCEDURE — 85610 PROTHROMBIN TIME: CPT

## 2019-03-13 PROCEDURE — 97597 DBRDMT OPN WND 1ST 20 CM/<: CPT

## 2019-03-13 PROCEDURE — 6370000000 HC RX 637 (ALT 250 FOR IP): Performed by: HOSPITALIST

## 2019-03-13 PROCEDURE — 36430 TRANSFUSION BLD/BLD COMPNT: CPT

## 2019-03-13 PROCEDURE — 29445 APPL RIGID TOT CNTC LEG CAST: CPT

## 2019-03-13 PROCEDURE — 17250 CHEM CAUT OF GRANLTJ TISSUE: CPT | Performed by: INTERNAL MEDICINE

## 2019-03-13 PROCEDURE — 85730 THROMBOPLASTIN TIME PARTIAL: CPT

## 2019-03-13 PROCEDURE — 11044 DBRDMT BONE 1ST 20 SQ CM/<: CPT

## 2019-03-13 PROCEDURE — 96361 HYDRATE IV INFUSION ADD-ON: CPT

## 2019-03-13 PROCEDURE — 6360000002 HC RX W HCPCS: Performed by: EMERGENCY MEDICINE

## 2019-03-13 PROCEDURE — 86850 RBC ANTIBODY SCREEN: CPT

## 2019-03-13 PROCEDURE — 99284 EMERGENCY DEPT VISIT MOD MDM: CPT

## 2019-03-13 PROCEDURE — 86923 COMPATIBILITY TEST ELECTRIC: CPT

## 2019-03-13 RX ORDER — METOPROLOL SUCCINATE 50 MG/1
100 TABLET, EXTENDED RELEASE ORAL NIGHTLY
Status: DISCONTINUED | OUTPATIENT
Start: 2019-03-13 | End: 2019-03-14 | Stop reason: HOSPADM

## 2019-03-13 RX ORDER — PANTOPRAZOLE SODIUM 40 MG/1
40 TABLET, DELAYED RELEASE ORAL DAILY
Status: DISCONTINUED | OUTPATIENT
Start: 2019-03-14 | End: 2019-03-14 | Stop reason: HOSPADM

## 2019-03-13 RX ORDER — DEXTROSE MONOHYDRATE 50 MG/ML
100 INJECTION, SOLUTION INTRAVENOUS PRN
Status: DISCONTINUED | OUTPATIENT
Start: 2019-03-13 | End: 2019-03-14 | Stop reason: HOSPADM

## 2019-03-13 RX ORDER — ROSUVASTATIN CALCIUM 10 MG/1
20 TABLET, COATED ORAL NIGHTLY
Status: DISCONTINUED | OUTPATIENT
Start: 2019-03-13 | End: 2019-03-14 | Stop reason: HOSPADM

## 2019-03-13 RX ORDER — 0.9 % SODIUM CHLORIDE 0.9 %
1000 INTRAVENOUS SOLUTION INTRAVENOUS ONCE
Status: COMPLETED | OUTPATIENT
Start: 2019-03-13 | End: 2019-03-13

## 2019-03-13 RX ORDER — SODIUM CHLORIDE 9 MG/ML
INJECTION, SOLUTION INTRAVENOUS
Status: COMPLETED
Start: 2019-03-13 | End: 2019-03-14

## 2019-03-13 RX ORDER — DOCUSATE SODIUM 100 MG/1
100 CAPSULE, LIQUID FILLED ORAL DAILY
Status: DISCONTINUED | OUTPATIENT
Start: 2019-03-14 | End: 2019-03-14 | Stop reason: HOSPADM

## 2019-03-13 RX ORDER — SODIUM CHLORIDE 0.9 % (FLUSH) 0.9 %
10 SYRINGE (ML) INJECTION PRN
Status: DISCONTINUED | OUTPATIENT
Start: 2019-03-13 | End: 2019-03-14 | Stop reason: HOSPADM

## 2019-03-13 RX ORDER — METOCLOPRAMIDE 10 MG/1
10 TABLET ORAL 3 TIMES DAILY PRN
Status: DISCONTINUED | OUTPATIENT
Start: 2019-03-13 | End: 2019-03-14 | Stop reason: HOSPADM

## 2019-03-13 RX ORDER — PROMETHAZINE HYDROCHLORIDE 25 MG/1
25 TABLET ORAL EVERY 6 HOURS PRN
Status: DISCONTINUED | OUTPATIENT
Start: 2019-03-13 | End: 2019-03-14 | Stop reason: HOSPADM

## 2019-03-13 RX ORDER — NICOTINE POLACRILEX 4 MG
15 LOZENGE BUCCAL PRN
Status: DISCONTINUED | OUTPATIENT
Start: 2019-03-13 | End: 2019-03-14 | Stop reason: HOSPADM

## 2019-03-13 RX ORDER — FLUCONAZOLE 100 MG/1
100 TABLET ORAL DAILY
Status: DISCONTINUED | OUTPATIENT
Start: 2019-03-14 | End: 2019-03-14 | Stop reason: HOSPADM

## 2019-03-13 RX ORDER — ONDANSETRON 2 MG/ML
4 INJECTION INTRAMUSCULAR; INTRAVENOUS EVERY 6 HOURS PRN
Status: DISCONTINUED | OUTPATIENT
Start: 2019-03-13 | End: 2019-03-14 | Stop reason: HOSPADM

## 2019-03-13 RX ORDER — SODIUM CHLORIDE 0.9 % (FLUSH) 0.9 %
10 SYRINGE (ML) INJECTION EVERY 12 HOURS SCHEDULED
Status: DISCONTINUED | OUTPATIENT
Start: 2019-03-13 | End: 2019-03-14 | Stop reason: HOSPADM

## 2019-03-13 RX ORDER — NITROGLYCERIN 0.4 MG/1
0.4 TABLET SUBLINGUAL EVERY 5 MIN PRN
Status: DISCONTINUED | OUTPATIENT
Start: 2019-03-13 | End: 2019-03-14 | Stop reason: HOSPADM

## 2019-03-13 RX ORDER — SODIUM CHLORIDE 9 MG/ML
INJECTION, SOLUTION INTRAVENOUS CONTINUOUS
Status: DISCONTINUED | OUTPATIENT
Start: 2019-03-13 | End: 2019-03-14

## 2019-03-13 RX ORDER — ACETAMINOPHEN 325 MG/1
650 TABLET ORAL EVERY 4 HOURS PRN
Status: DISCONTINUED | OUTPATIENT
Start: 2019-03-13 | End: 2019-03-14 | Stop reason: HOSPADM

## 2019-03-13 RX ORDER — FLUCONAZOLE 100 MG/1
100 TABLET ORAL DAILY
Qty: 7 TABLET | Refills: 0 | Status: SHIPPED | OUTPATIENT
Start: 2019-03-13 | End: 2019-03-20

## 2019-03-13 RX ORDER — TORSEMIDE 20 MG/1
40 TABLET ORAL DAILY
Status: DISCONTINUED | OUTPATIENT
Start: 2019-03-14 | End: 2019-03-14 | Stop reason: HOSPADM

## 2019-03-13 RX ORDER — FERROUS SULFATE 325(65) MG
325 TABLET ORAL
Status: DISCONTINUED | OUTPATIENT
Start: 2019-03-14 | End: 2019-03-14 | Stop reason: HOSPADM

## 2019-03-13 RX ORDER — LIDOCAINE 40 MG/G
CREAM TOPICAL ONCE
Status: DISCONTINUED | OUTPATIENT
Start: 2019-03-13 | End: 2019-03-14 | Stop reason: HOSPADM

## 2019-03-13 RX ORDER — METOCLOPRAMIDE HYDROCHLORIDE 5 MG/ML
5 INJECTION INTRAMUSCULAR; INTRAVENOUS ONCE
Status: COMPLETED | OUTPATIENT
Start: 2019-03-13 | End: 2019-03-13

## 2019-03-13 RX ORDER — CYCLOBENZAPRINE HCL 10 MG
10 TABLET ORAL 2 TIMES DAILY PRN
Status: DISCONTINUED | OUTPATIENT
Start: 2019-03-13 | End: 2019-03-14 | Stop reason: HOSPADM

## 2019-03-13 RX ORDER — OXYCODONE HYDROCHLORIDE 5 MG/1
5 TABLET ORAL DAILY PRN
Status: DISCONTINUED | OUTPATIENT
Start: 2019-03-13 | End: 2019-03-14 | Stop reason: HOSPADM

## 2019-03-13 RX ORDER — INSULIN GLARGINE 100 [IU]/ML
50 INJECTION, SOLUTION SUBCUTANEOUS NIGHTLY
Status: DISCONTINUED | OUTPATIENT
Start: 2019-03-13 | End: 2019-03-14 | Stop reason: HOSPADM

## 2019-03-13 RX ORDER — POLYETHYLENE GLYCOL 3350 17 G/17G
17 POWDER, FOR SOLUTION ORAL PRN
Status: DISCONTINUED | OUTPATIENT
Start: 2019-03-13 | End: 2019-03-14 | Stop reason: HOSPADM

## 2019-03-13 RX ORDER — DEXTROSE MONOHYDRATE 25 G/50ML
12.5 INJECTION, SOLUTION INTRAVENOUS PRN
Status: DISCONTINUED | OUTPATIENT
Start: 2019-03-13 | End: 2019-03-14 | Stop reason: HOSPADM

## 2019-03-13 RX ORDER — TRAZODONE HYDROCHLORIDE 50 MG/1
25 TABLET ORAL NIGHTLY
Status: DISCONTINUED | OUTPATIENT
Start: 2019-03-13 | End: 2019-03-14 | Stop reason: HOSPADM

## 2019-03-13 RX ADMIN — CYCLOBENZAPRINE HYDROCHLORIDE 10 MG: 10 TABLET, FILM COATED ORAL at 23:17

## 2019-03-13 RX ADMIN — SODIUM CHLORIDE 1000 ML: 9 INJECTION, SOLUTION INTRAVENOUS at 19:17

## 2019-03-13 RX ADMIN — METOCLOPRAMIDE 5 MG: 5 INJECTION, SOLUTION INTRAMUSCULAR; INTRAVENOUS at 21:35

## 2019-03-13 RX ADMIN — OXYCODONE HYDROCHLORIDE 5 MG: 5 TABLET ORAL at 23:17

## 2019-03-13 RX ADMIN — SODIUM CHLORIDE 1 ML: 9 INJECTION, SOLUTION INTRAVENOUS at 21:33

## 2019-03-13 ASSESSMENT — PAIN - FUNCTIONAL ASSESSMENT: PAIN_FUNCTIONAL_ASSESSMENT: PREVENTS OR INTERFERES WITH MANY ACTIVE NOT PASSIVE ACTIVITIES

## 2019-03-13 ASSESSMENT — PAIN DESCRIPTION - PAIN TYPE: TYPE: ACUTE PAIN;CHRONIC PAIN

## 2019-03-13 ASSESSMENT — PAIN DESCRIPTION - DESCRIPTORS: DESCRIPTORS: ACHING

## 2019-03-13 ASSESSMENT — PAIN DESCRIPTION - FREQUENCY: FREQUENCY: CONTINUOUS

## 2019-03-13 ASSESSMENT — PAIN DESCRIPTION - LOCATION: LOCATION: NECK

## 2019-03-13 ASSESSMENT — PAIN DESCRIPTION - ONSET: ONSET: ON-GOING

## 2019-03-13 ASSESSMENT — PAIN DESCRIPTION - PROGRESSION: CLINICAL_PROGRESSION: NOT CHANGED

## 2019-03-13 ASSESSMENT — PAIN SCALES - GENERAL: PAINLEVEL_OUTOF10: 5

## 2019-03-14 VITALS
OXYGEN SATURATION: 98 % | RESPIRATION RATE: 18 BRPM | TEMPERATURE: 97.6 F | BODY MASS INDEX: 29.57 KG/M2 | WEIGHT: 230.4 LBS | DIASTOLIC BLOOD PRESSURE: 71 MMHG | SYSTOLIC BLOOD PRESSURE: 118 MMHG | HEIGHT: 74 IN | HEART RATE: 102 BPM

## 2019-03-14 PROBLEM — D62 ACUTE BLOOD LOSS ANEMIA: Status: ACTIVE | Noted: 2019-03-14

## 2019-03-14 LAB
ANION GAP SERPL CALCULATED.3IONS-SCNC: 13 MMOL/L (ref 3–16)
BLOOD BANK DISPENSE STATUS: NORMAL
BLOOD BANK DISPENSE STATUS: NORMAL
BLOOD BANK PRODUCT CODE: NORMAL
BLOOD BANK PRODUCT CODE: NORMAL
BPU ID: NORMAL
BPU ID: NORMAL
BUN BLDV-MCNC: 42 MG/DL (ref 7–20)
CALCIUM SERPL-MCNC: 7.8 MG/DL (ref 8.3–10.6)
CHLORIDE BLD-SCNC: 97 MMOL/L (ref 99–110)
CO2: 24 MMOL/L (ref 21–32)
CREAT SERPL-MCNC: 0.9 MG/DL (ref 0.9–1.3)
DESCRIPTION BLOOD BANK: NORMAL
DESCRIPTION BLOOD BANK: NORMAL
GFR AFRICAN AMERICAN: >60
GFR NON-AFRICAN AMERICAN: >60
GLUCOSE BLD-MCNC: 179 MG/DL (ref 70–99)
GLUCOSE BLD-MCNC: 229 MG/DL (ref 70–99)
GLUCOSE BLD-MCNC: 79 MG/DL (ref 70–99)
HCT VFR BLD CALC: 21 % (ref 40.5–52.5)
HEMOGLOBIN: 6.7 G/DL (ref 13.5–17.5)
MAGNESIUM: 1.3 MG/DL (ref 1.8–2.4)
MCH RBC QN AUTO: 26.3 PG (ref 26–34)
MCHC RBC AUTO-ENTMCNC: 32.2 G/DL (ref 31–36)
MCV RBC AUTO: 81.9 FL (ref 80–100)
PDW BLD-RTO: 17.8 % (ref 12.4–15.4)
PERFORMED ON: ABNORMAL
PERFORMED ON: ABNORMAL
PLATELET # BLD: 317 K/UL (ref 135–450)
PMV BLD AUTO: 7.9 FL (ref 5–10.5)
POTASSIUM REFLEX MAGNESIUM: 3.5 MMOL/L (ref 3.5–5.1)
RBC # BLD: 2.56 M/UL (ref 4.2–5.9)
SODIUM BLD-SCNC: 134 MMOL/L (ref 136–145)
WBC # BLD: 8.7 K/UL (ref 4–11)

## 2019-03-14 PROCEDURE — 83735 ASSAY OF MAGNESIUM: CPT

## 2019-03-14 PROCEDURE — 6370000000 HC RX 637 (ALT 250 FOR IP): Performed by: HOSPITALIST

## 2019-03-14 PROCEDURE — 36430 TRANSFUSION BLD/BLD COMPNT: CPT

## 2019-03-14 PROCEDURE — 86923 COMPATIBILITY TEST ELECTRIC: CPT

## 2019-03-14 PROCEDURE — 80048 BASIC METABOLIC PNL TOTAL CA: CPT

## 2019-03-14 PROCEDURE — P9016 RBC LEUKOCYTES REDUCED: HCPCS

## 2019-03-14 PROCEDURE — 99024 POSTOP FOLLOW-UP VISIT: CPT | Performed by: SURGERY

## 2019-03-14 PROCEDURE — 2580000003 HC RX 258

## 2019-03-14 PROCEDURE — 99238 HOSP IP/OBS DSCHRG MGMT 30/<: CPT | Performed by: INTERNAL MEDICINE

## 2019-03-14 PROCEDURE — 85027 COMPLETE CBC AUTOMATED: CPT

## 2019-03-14 PROCEDURE — 6370000000 HC RX 637 (ALT 250 FOR IP): Performed by: INTERNAL MEDICINE

## 2019-03-14 PROCEDURE — 2580000003 HC RX 258: Performed by: HOSPITALIST

## 2019-03-14 PROCEDURE — 2580000003 HC RX 258: Performed by: INTERNAL MEDICINE

## 2019-03-14 RX ORDER — SENNA PLUS 8.6 MG/1
2 TABLET ORAL DAILY
Status: DISCONTINUED | OUTPATIENT
Start: 2019-03-14 | End: 2019-03-14 | Stop reason: HOSPADM

## 2019-03-14 RX ORDER — 0.9 % SODIUM CHLORIDE 0.9 %
250 INTRAVENOUS SOLUTION INTRAVENOUS ONCE
Status: DISCONTINUED | OUTPATIENT
Start: 2019-03-14 | End: 2019-03-14 | Stop reason: HOSPADM

## 2019-03-14 RX ADMIN — Medication 2000 MG: at 17:42

## 2019-03-14 RX ADMIN — PANTOPRAZOLE SODIUM 40 MG: 40 TABLET, DELAYED RELEASE ORAL at 09:45

## 2019-03-14 RX ADMIN — Medication 1600 MG: at 11:27

## 2019-03-14 RX ADMIN — SODIUM CHLORIDE 250 ML: 9 INJECTION, SOLUTION INTRAVENOUS at 09:02

## 2019-03-14 RX ADMIN — METFORMIN HYDROCHLORIDE 1000 MG: 500 TABLET ORAL at 11:27

## 2019-03-14 RX ADMIN — FERROUS SULFATE TAB 325 MG (65 MG ELEMENTAL FE) 325 MG: 325 (65 FE) TAB at 09:45

## 2019-03-14 RX ADMIN — TORSEMIDE 40 MG: 20 TABLET ORAL at 09:45

## 2019-03-14 RX ADMIN — FLUCONAZOLE 100 MG: 100 TABLET ORAL at 09:45

## 2019-03-14 RX ADMIN — INSULIN LISPRO 1 UNITS: 100 INJECTION, SOLUTION INTRAVENOUS; SUBCUTANEOUS at 11:37

## 2019-03-14 RX ADMIN — METFORMIN HYDROCHLORIDE 1000 MG: 500 TABLET ORAL at 17:42

## 2019-03-14 RX ADMIN — STANDARDIZED SENNA CONCENTRATE 17.2 MG: 8.6 TABLET ORAL at 11:27

## 2019-03-14 RX ADMIN — INSULIN LISPRO 2 UNITS: 100 INJECTION, SOLUTION INTRAVENOUS; SUBCUTANEOUS at 17:26

## 2019-03-14 RX ADMIN — SODIUM CHLORIDE: 9 INJECTION, SOLUTION INTRAVENOUS at 06:09

## 2019-03-14 RX ADMIN — SODIUM CHLORIDE: 9 INJECTION, SOLUTION INTRAVENOUS at 02:56

## 2019-03-14 RX ADMIN — DOCUSATE SODIUM 100 MG: 100 CAPSULE, LIQUID FILLED ORAL at 09:45

## 2019-03-14 RX ADMIN — Medication 10 ML: at 09:46

## 2019-03-14 ASSESSMENT — ENCOUNTER SYMPTOMS
NAUSEA: 1
DIARRHEA: 1
VOMITING: 1
SHORTNESS OF BREATH: 1

## 2019-03-14 ASSESSMENT — PAIN SCALES - GENERAL: PAINLEVEL_OUTOF10: 0

## 2019-03-15 ENCOUNTER — CARE COORDINATION (OUTPATIENT)
Dept: CASE MANAGEMENT | Age: 54
End: 2019-03-15

## 2019-03-15 RX ORDER — BLOOD-GLUCOSE METER
EACH MISCELLANEOUS
Qty: 1 KIT | Refills: 0 | Status: SHIPPED | OUTPATIENT
Start: 2019-03-15 | End: 2019-03-19 | Stop reason: SDUPTHER

## 2019-03-19 ENCOUNTER — OFFICE VISIT (OUTPATIENT)
Dept: INTERNAL MEDICINE CLINIC | Age: 54
End: 2019-03-19

## 2019-03-19 VITALS
DIASTOLIC BLOOD PRESSURE: 82 MMHG | TEMPERATURE: 97.6 F | HEART RATE: 84 BPM | RESPIRATION RATE: 12 BRPM | SYSTOLIC BLOOD PRESSURE: 120 MMHG

## 2019-03-19 DIAGNOSIS — E83.42 HYPOMAGNESEMIA: ICD-10-CM

## 2019-03-19 DIAGNOSIS — L89.324 PRESSURE INJURY OF LEFT ISCHIUM, STAGE 4 (HCC): ICD-10-CM

## 2019-03-19 DIAGNOSIS — D62 ACUTE BLOOD LOSS ANEMIA: ICD-10-CM

## 2019-03-19 DIAGNOSIS — J30.9 ALLERGIC RHINITIS, UNSPECIFIED SEASONALITY, UNSPECIFIED TRIGGER: ICD-10-CM

## 2019-03-19 DIAGNOSIS — I25.10 CORONARY ARTERY DISEASE INVOLVING NATIVE CORONARY ARTERY OF NATIVE HEART WITHOUT ANGINA PECTORIS: ICD-10-CM

## 2019-03-19 DIAGNOSIS — D63.8 ANEMIA OF CHRONIC DISORDER: Primary | ICD-10-CM

## 2019-03-19 DIAGNOSIS — M62.838 MUSCLE SPASM: ICD-10-CM

## 2019-03-19 PROCEDURE — 3017F COLORECTAL CA SCREEN DOC REV: CPT | Performed by: PHYSICIAN ASSISTANT

## 2019-03-19 PROCEDURE — G8484 FLU IMMUNIZE NO ADMIN: HCPCS | Performed by: PHYSICIAN ASSISTANT

## 2019-03-19 PROCEDURE — 1036F TOBACCO NON-USER: CPT | Performed by: PHYSICIAN ASSISTANT

## 2019-03-19 PROCEDURE — G8598 ASA/ANTIPLAT THER USED: HCPCS | Performed by: PHYSICIAN ASSISTANT

## 2019-03-19 PROCEDURE — 1111F DSCHRG MED/CURRENT MED MERGE: CPT | Performed by: PHYSICIAN ASSISTANT

## 2019-03-19 PROCEDURE — G8417 CALC BMI ABV UP PARAM F/U: HCPCS | Performed by: PHYSICIAN ASSISTANT

## 2019-03-19 PROCEDURE — 99214 OFFICE O/P EST MOD 30 MIN: CPT | Performed by: PHYSICIAN ASSISTANT

## 2019-03-19 PROCEDURE — G8427 DOCREV CUR MEDS BY ELIG CLIN: HCPCS | Performed by: PHYSICIAN ASSISTANT

## 2019-03-19 RX ORDER — BLOOD SUGAR DIAGNOSTIC
STRIP MISCELLANEOUS
Qty: 100 EACH | Refills: 3 | Status: SHIPPED | OUTPATIENT
Start: 2019-03-19 | End: 2019-04-18

## 2019-03-19 RX ORDER — SENNA PLUS 8.6 MG/1
TABLET ORAL
Qty: 180 TABLET | Refills: 0 | Status: SHIPPED | OUTPATIENT
Start: 2019-03-19 | End: 2019-08-30 | Stop reason: SDUPTHER

## 2019-03-19 RX ORDER — LORATADINE 10 MG/1
TABLET ORAL
Qty: 90 TABLET | Refills: 0 | Status: SHIPPED | OUTPATIENT
Start: 2019-03-19 | End: 2019-10-30

## 2019-03-19 RX ORDER — DOCUSATE SODIUM 100 MG/1
200 CAPSULE, LIQUID FILLED ORAL DAILY
Qty: 180 CAPSULE | Refills: 0 | Status: SHIPPED | OUTPATIENT
Start: 2019-03-19 | End: 2019-08-23 | Stop reason: SDUPTHER

## 2019-03-19 RX ORDER — BLOOD-GLUCOSE METER
EACH MISCELLANEOUS
Qty: 1 KIT | Refills: 0 | Status: SHIPPED | OUTPATIENT
Start: 2019-03-19 | End: 2019-04-18

## 2019-03-19 ASSESSMENT — ENCOUNTER SYMPTOMS
PHOTOPHOBIA: 0
VOMITING: 0
EYE ITCHING: 1
RHINORRHEA: 0
NAUSEA: 0
SHORTNESS OF BREATH: 0
COUGH: 0
BLOOD IN STOOL: 0
ANAL BLEEDING: 0
DIARRHEA: 0

## 2019-03-20 ENCOUNTER — HOSPITAL ENCOUNTER (OUTPATIENT)
Dept: WOUND CARE | Age: 54
Discharge: HOME | End: 2019-03-20
Payer: MEDICARE

## 2019-03-20 VITALS
HEART RATE: 76 BPM | TEMPERATURE: 97 F | RESPIRATION RATE: 16 BRPM | DIASTOLIC BLOOD PRESSURE: 70 MMHG | SYSTOLIC BLOOD PRESSURE: 103 MMHG

## 2019-03-20 LAB
ANION GAP SERPL CALCULATED.3IONS-SCNC: 17 MMOL/L (ref 3–16)
BASOPHILS ABSOLUTE: 0.1 K/UL (ref 0–0.2)
BASOPHILS RELATIVE PERCENT: 1.1 %
BUN BLDV-MCNC: 37 MG/DL (ref 7–20)
CALCIUM SERPL-MCNC: 8.5 MG/DL (ref 8.3–10.6)
CHLORIDE BLD-SCNC: 89 MMOL/L (ref 99–110)
CO2: 26 MMOL/L (ref 21–32)
CREAT SERPL-MCNC: 0.9 MG/DL (ref 0.9–1.3)
EOSINOPHILS ABSOLUTE: 0.3 K/UL (ref 0–0.6)
EOSINOPHILS RELATIVE PERCENT: 2.9 %
GFR AFRICAN AMERICAN: >60
GFR NON-AFRICAN AMERICAN: >60
GLUCOSE BLD-MCNC: 122 MG/DL (ref 70–99)
HCT VFR BLD CALC: 26.7 % (ref 40.5–52.5)
HEMOGLOBIN: 8.8 G/DL (ref 13.5–17.5)
LYMPHOCYTES ABSOLUTE: 1.1 K/UL (ref 1–5.1)
LYMPHOCYTES RELATIVE PERCENT: 11 %
MAGNESIUM: 1.2 MG/DL (ref 1.8–2.4)
MCH RBC QN AUTO: 27.8 PG (ref 26–34)
MCHC RBC AUTO-ENTMCNC: 33 G/DL (ref 31–36)
MCV RBC AUTO: 84.5 FL (ref 80–100)
MONOCYTES ABSOLUTE: 0.5 K/UL (ref 0–1.3)
MONOCYTES RELATIVE PERCENT: 5.4 %
NEUTROPHILS ABSOLUTE: 7.8 K/UL (ref 1.7–7.7)
NEUTROPHILS RELATIVE PERCENT: 79.6 %
PDW BLD-RTO: 18.9 % (ref 12.4–15.4)
PLATELET # BLD: 407 K/UL (ref 135–450)
PMV BLD AUTO: 8.9 FL (ref 5–10.5)
POTASSIUM SERPL-SCNC: 3.5 MMOL/L (ref 3.5–5.1)
RBC # BLD: 3.16 M/UL (ref 4.2–5.9)
SODIUM BLD-SCNC: 132 MMOL/L (ref 136–145)
WBC # BLD: 9.8 K/UL (ref 4–11)

## 2019-03-20 PROCEDURE — 97597 DBRDMT OPN WND 1ST 20 CM/<: CPT

## 2019-03-20 PROCEDURE — 97605 NEG PRS WND THER DME<=50SQCM: CPT | Performed by: INTERNAL MEDICINE

## 2019-03-20 PROCEDURE — 17250 CHEM CAUT OF GRANLTJ TISSUE: CPT

## 2019-03-20 PROCEDURE — 83735 ASSAY OF MAGNESIUM: CPT

## 2019-03-20 PROCEDURE — 97597 DBRDMT OPN WND 1ST 20 CM/<: CPT | Performed by: INTERNAL MEDICINE

## 2019-03-20 PROCEDURE — 36415 COLL VENOUS BLD VENIPUNCTURE: CPT

## 2019-03-20 PROCEDURE — 11046 DBRDMT MUSC&/FSCA EA ADDL: CPT

## 2019-03-20 PROCEDURE — 17250 CHEM CAUT OF GRANLTJ TISSUE: CPT | Performed by: INTERNAL MEDICINE

## 2019-03-20 PROCEDURE — 29445 APPL RIGID TOT CNTC LEG CAST: CPT | Performed by: INTERNAL MEDICINE

## 2019-03-20 PROCEDURE — 80048 BASIC METABOLIC PNL TOTAL CA: CPT

## 2019-03-20 PROCEDURE — 97605 NEG PRS WND THER DME<=50SQCM: CPT

## 2019-03-20 PROCEDURE — 11046 DBRDMT MUSC&/FSCA EA ADDL: CPT | Performed by: INTERNAL MEDICINE

## 2019-03-20 PROCEDURE — 29445 APPL RIGID TOT CNTC LEG CAST: CPT

## 2019-03-20 PROCEDURE — 11043 DBRDMT MUSC&/FSCA 1ST 20/<: CPT

## 2019-03-20 PROCEDURE — 11043 DBRDMT MUSC&/FSCA 1ST 20/<: CPT | Performed by: INTERNAL MEDICINE

## 2019-03-20 PROCEDURE — 85025 COMPLETE CBC W/AUTO DIFF WBC: CPT

## 2019-03-20 RX ORDER — LIDOCAINE 40 MG/G
CREAM TOPICAL ONCE
Status: DISCONTINUED | OUTPATIENT
Start: 2019-03-20 | End: 2019-03-21 | Stop reason: HOSPADM

## 2019-03-21 ENCOUNTER — CARE COORDINATION (OUTPATIENT)
Dept: CASE MANAGEMENT | Age: 54
End: 2019-03-21

## 2019-03-27 ENCOUNTER — HOSPITAL ENCOUNTER (OUTPATIENT)
Dept: WOUND CARE | Age: 54
Discharge: HOME | End: 2019-03-27
Payer: MEDICARE

## 2019-03-27 VITALS
HEART RATE: 71 BPM | RESPIRATION RATE: 16 BRPM | BODY MASS INDEX: 29.58 KG/M2 | HEIGHT: 74 IN | DIASTOLIC BLOOD PRESSURE: 68 MMHG | TEMPERATURE: 96.6 F | SYSTOLIC BLOOD PRESSURE: 100 MMHG

## 2019-03-27 PROCEDURE — 17250 CHEM CAUT OF GRANLTJ TISSUE: CPT | Performed by: INTERNAL MEDICINE

## 2019-03-27 PROCEDURE — 97598 DBRDMT OPN WND ADDL 20CM/<: CPT

## 2019-03-27 PROCEDURE — 97605 NEG PRS WND THER DME<=50SQCM: CPT | Performed by: INTERNAL MEDICINE

## 2019-03-27 PROCEDURE — 17250 CHEM CAUT OF GRANLTJ TISSUE: CPT

## 2019-03-27 PROCEDURE — 97598 DBRDMT OPN WND ADDL 20CM/<: CPT | Performed by: INTERNAL MEDICINE

## 2019-03-27 PROCEDURE — 97597 DBRDMT OPN WND 1ST 20 CM/<: CPT | Performed by: INTERNAL MEDICINE

## 2019-03-27 PROCEDURE — 29445 APPL RIGID TOT CNTC LEG CAST: CPT

## 2019-03-27 PROCEDURE — 11044 DBRDMT BONE 1ST 20 SQ CM/<: CPT | Performed by: INTERNAL MEDICINE

## 2019-03-27 PROCEDURE — 97597 DBRDMT OPN WND 1ST 20 CM/<: CPT

## 2019-03-27 PROCEDURE — 97605 NEG PRS WND THER DME<=50SQCM: CPT

## 2019-03-27 PROCEDURE — 11044 DBRDMT BONE 1ST 20 SQ CM/<: CPT

## 2019-03-27 RX ORDER — LIDOCAINE 40 MG/G
CREAM TOPICAL ONCE
Status: DISCONTINUED | OUTPATIENT
Start: 2019-03-27 | End: 2019-03-28 | Stop reason: HOSPADM

## 2019-03-29 RX ORDER — METOPROLOL SUCCINATE 100 MG/1
TABLET, EXTENDED RELEASE ORAL
Qty: 90 TABLET | Refills: 0 | Status: SHIPPED | OUTPATIENT
Start: 2019-03-29 | End: 2019-07-02 | Stop reason: SDUPTHER

## 2019-04-02 DIAGNOSIS — K21.9 GASTROESOPHAGEAL REFLUX DISEASE WITHOUT ESOPHAGITIS: ICD-10-CM

## 2019-04-03 ENCOUNTER — HOSPITAL ENCOUNTER (OUTPATIENT)
Dept: WOUND CARE | Age: 54
Discharge: HOME | End: 2019-04-03
Payer: MEDICARE

## 2019-04-03 VITALS
RESPIRATION RATE: 18 BRPM | SYSTOLIC BLOOD PRESSURE: 92 MMHG | HEART RATE: 76 BPM | TEMPERATURE: 97.9 F | DIASTOLIC BLOOD PRESSURE: 60 MMHG

## 2019-04-03 PROCEDURE — 97597 DBRDMT OPN WND 1ST 20 CM/<: CPT

## 2019-04-03 PROCEDURE — 11044 DBRDMT BONE 1ST 20 SQ CM/<: CPT

## 2019-04-03 PROCEDURE — 97605 NEG PRS WND THER DME<=50SQCM: CPT

## 2019-04-03 PROCEDURE — 11044 DBRDMT BONE 1ST 20 SQ CM/<: CPT | Performed by: INTERNAL MEDICINE

## 2019-04-03 PROCEDURE — 97597 DBRDMT OPN WND 1ST 20 CM/<: CPT | Performed by: INTERNAL MEDICINE

## 2019-04-03 PROCEDURE — 97598 DBRDMT OPN WND ADDL 20CM/<: CPT

## 2019-04-03 PROCEDURE — 17250 CHEM CAUT OF GRANLTJ TISSUE: CPT

## 2019-04-03 PROCEDURE — 29445 APPL RIGID TOT CNTC LEG CAST: CPT

## 2019-04-03 PROCEDURE — 97598 DBRDMT OPN WND ADDL 20CM/<: CPT | Performed by: INTERNAL MEDICINE

## 2019-04-03 RX ORDER — PANTOPRAZOLE SODIUM 40 MG/1
TABLET, DELAYED RELEASE ORAL
Qty: 90 TABLET | Refills: 0 | Status: SHIPPED | OUTPATIENT
Start: 2019-04-03 | End: 2019-07-15 | Stop reason: SDUPTHER

## 2019-04-03 RX ORDER — LIDOCAINE 40 MG/G
CREAM TOPICAL ONCE
Status: DISCONTINUED | OUTPATIENT
Start: 2019-04-03 | End: 2019-04-04 | Stop reason: HOSPADM

## 2019-04-03 NOTE — PLAN OF CARE
Thoracic wound stable, debridement per MD, cont. With current wound care regime. Right ischial wound noted to have two tunneling areas, deepest tunnel >14cm per MD, packing placed into deepest tunnel & to remain in place for the week, otherwise cont. With current wound care regime. May consider placing NPWT to right ischial wound in the next few weeks. Left ischial wound stable, debridement per MD & will stop Triad since tissue healthier, otherwise cont. With current wound care regime. Pt. States he is cont. To off-load as ordered & not sitting in chair. Pt. Is cont. To be transported to [address] [place] [address] via stretcher. Reinforced off-loading & how his efforts are showing in his wounds. Right BKA stump wounds stable, debridement per MD, cont. With yoni, Iodoform on lateral wound & NPWT as ordered. Left heel wound noted to have bone exposure, bone debridement per MD, will cont. With current wound care regime & TCC for off-loading. F/u in [address] [place] [address] in 1 week as ordered. Discharge instructions reviewed with patient, all questions answered, copy given to patient. Dressings were applied to all wounds per M.D. Instructions at this visit.

## 2019-04-10 ENCOUNTER — HOSPITAL ENCOUNTER (OUTPATIENT)
Dept: WOUND CARE | Age: 54
Discharge: HOME | End: 2019-04-10
Payer: MEDICARE

## 2019-04-10 VITALS
HEIGHT: 74 IN | RESPIRATION RATE: 18 BRPM | HEART RATE: 78 BPM | DIASTOLIC BLOOD PRESSURE: 66 MMHG | TEMPERATURE: 96.6 F | BODY MASS INDEX: 29.58 KG/M2 | SYSTOLIC BLOOD PRESSURE: 99 MMHG

## 2019-04-10 LAB
BASOPHILS ABSOLUTE: 0.1 K/UL (ref 0–0.2)
BASOPHILS RELATIVE PERCENT: 0.8 %
C-REACTIVE PROTEIN: 196.7 MG/L (ref 0–5.1)
EOSINOPHILS ABSOLUTE: 0.3 K/UL (ref 0–0.6)
EOSINOPHILS RELATIVE PERCENT: 2.3 %
HCT VFR BLD CALC: 26.5 % (ref 40.5–52.5)
HEMOGLOBIN: 8 G/DL (ref 13.5–17.5)
LYMPHOCYTES ABSOLUTE: 0.9 K/UL (ref 1–5.1)
LYMPHOCYTES RELATIVE PERCENT: 7.5 %
MCH RBC QN AUTO: 23.6 PG (ref 26–34)
MCHC RBC AUTO-ENTMCNC: 30.3 G/DL (ref 31–36)
MCV RBC AUTO: 77.9 FL (ref 80–100)
MONOCYTES ABSOLUTE: 0.9 K/UL (ref 0–1.3)
MONOCYTES RELATIVE PERCENT: 7.3 %
NEUTROPHILS ABSOLUTE: 10.3 K/UL (ref 1.7–7.7)
NEUTROPHILS RELATIVE PERCENT: 82.1 %
PDW BLD-RTO: 20 % (ref 12.4–15.4)
PLATELET # BLD: 511 K/UL (ref 135–450)
PMV BLD AUTO: 9.1 FL (ref 5–10.5)
RBC # BLD: 3.4 M/UL (ref 4.2–5.9)
SEDIMENTATION RATE, ERYTHROCYTE: >120 MM/HR (ref 0–20)
WBC # BLD: 12.5 K/UL (ref 4–11)

## 2019-04-10 PROCEDURE — 29445 APPL RIGID TOT CNTC LEG CAST: CPT

## 2019-04-10 PROCEDURE — 36415 COLL VENOUS BLD VENIPUNCTURE: CPT

## 2019-04-10 PROCEDURE — 85025 COMPLETE CBC W/AUTO DIFF WBC: CPT

## 2019-04-10 PROCEDURE — 97605 NEG PRS WND THER DME<=50SQCM: CPT

## 2019-04-10 PROCEDURE — 87186 SC STD MICRODIL/AGAR DIL: CPT

## 2019-04-10 PROCEDURE — 97598 DBRDMT OPN WND ADDL 20CM/<: CPT

## 2019-04-10 PROCEDURE — 85652 RBC SED RATE AUTOMATED: CPT

## 2019-04-10 PROCEDURE — 87077 CULTURE AEROBIC IDENTIFY: CPT

## 2019-04-10 PROCEDURE — 11044 DBRDMT BONE 1ST 20 SQ CM/<: CPT

## 2019-04-10 PROCEDURE — 17250 CHEM CAUT OF GRANLTJ TISSUE: CPT

## 2019-04-10 PROCEDURE — 17250 CHEM CAUT OF GRANLTJ TISSUE: CPT | Performed by: INTERNAL MEDICINE

## 2019-04-10 PROCEDURE — 87205 SMEAR GRAM STAIN: CPT

## 2019-04-10 PROCEDURE — 97597 DBRDMT OPN WND 1ST 20 CM/<: CPT | Performed by: INTERNAL MEDICINE

## 2019-04-10 PROCEDURE — 11044 DBRDMT BONE 1ST 20 SQ CM/<: CPT | Performed by: INTERNAL MEDICINE

## 2019-04-10 PROCEDURE — 87070 CULTURE OTHR SPECIMN AEROBIC: CPT

## 2019-04-10 PROCEDURE — 97598 DBRDMT OPN WND ADDL 20CM/<: CPT | Performed by: INTERNAL MEDICINE

## 2019-04-10 PROCEDURE — 97597 DBRDMT OPN WND 1ST 20 CM/<: CPT

## 2019-04-10 PROCEDURE — 86140 C-REACTIVE PROTEIN: CPT

## 2019-04-10 RX ORDER — LIDOCAINE 40 MG/G
CREAM TOPICAL PRN
Status: DISCONTINUED | OUTPATIENT
Start: 2019-04-10 | End: 2019-04-11 | Stop reason: HOSPADM

## 2019-04-10 ASSESSMENT — PAIN SCALES - GENERAL: PAINLEVEL_OUTOF10: 3

## 2019-04-10 ASSESSMENT — PAIN - FUNCTIONAL ASSESSMENT: PAIN_FUNCTIONAL_ASSESSMENT: PREVENTS OR INTERFERES WITH MANY ACTIVE NOT PASSIVE ACTIVITIES

## 2019-04-10 ASSESSMENT — PAIN DESCRIPTION - DESCRIPTORS: DESCRIPTORS: ACHING;BURNING;THROBBING

## 2019-04-10 ASSESSMENT — PAIN DESCRIPTION - PAIN TYPE: TYPE: CHRONIC PAIN

## 2019-04-10 ASSESSMENT — PAIN DESCRIPTION - ORIENTATION: ORIENTATION: LEFT

## 2019-04-10 ASSESSMENT — PAIN DESCRIPTION - FREQUENCY: FREQUENCY: CONTINUOUS

## 2019-04-10 ASSESSMENT — PAIN DESCRIPTION - LOCATION: LOCATION: NECK;SHOULDER

## 2019-04-10 ASSESSMENT — PAIN DESCRIPTION - ONSET: ONSET: ON-GOING

## 2019-04-10 ASSESSMENT — ACTIVITIES OF DAILY LIVING (ADL): EFFECT OF PAIN ON DAILY ACTIVITIES: TURNING

## 2019-04-10 ASSESSMENT — PAIN DESCRIPTION - PROGRESSION: CLINICAL_PROGRESSION: NOT CHANGED

## 2019-04-10 ASSESSMENT — PAIN DESCRIPTION - DIRECTION: RADIATING_TOWARDS: DENIES

## 2019-04-13 LAB
GRAM STAIN RESULT: ABNORMAL
ORGANISM: ABNORMAL
WOUND/ABSCESS: ABNORMAL

## 2019-04-14 PROBLEM — D64.9 ANEMIA: Status: RESOLVED | Noted: 2019-03-13 | Resolved: 2019-04-14

## 2019-04-14 PROBLEM — D62 ACUTE BLOOD LOSS ANEMIA: Status: RESOLVED | Noted: 2019-03-14 | Resolved: 2019-04-14

## 2019-04-14 NOTE — PROGRESS NOTES
[facility] Progress Note    Ping Rosenberg     : 1965    DATE OF VISIT:  4/3/2019    Subjective:     Ping Rosenberg is a 48 y.o. male who has a diabetic  and  pressure ulcer located on the foot (left  and heel). Significant symptoms or pertinent wound history since last visit: feeling ok overall, no F/C/D, no CP or SOB, no N/V/D. Doing an excellent job with offloading, in terms of spending little to no time in his chair. Additional ulcer(s) noted? yes. T-spine dehisced surgical, pressure of left leg, BL ischia, dehisced surgical at right BKA stump. His current medication list consists of Apixaban, Insulin Lispro, Insulin Syringe-Needle U-100, ONE TOUCH LANCETS, ONETOUCH VERIO, TraZODone HCl, aspirin, blood glucose test strips, cyclobenzaprine, docusate sodium, ferrous sulfate, insulin glargine, loratadine, magnesium oxide, metFORMIN, metoclopramide, metoprolol succinate, metroNIDAZOLE, nitroGLYCERIN, nystatin, oxyCODONE, pantoprazole, polyethylene glycol, promethazine, rosuvastatin, senna, and torsemide. Allergies: Benadryl [diphenhydramine hcl]; Statins [statins]; Cephalexin; Morphine; Penicillins; and Sulfa antibiotics    Objective:     Vitals:    19 1125   BP: 92/60   Pulse: 76   Resp: 18   Temp: 97.9 °F (36.6 °C)   TempSrc: Oral     AAOx3, overweight, fatigued, NAD  Intact left distal pulses, left foot and right BK stump warm, very mild edema  No cellulitis, angitis, fluctuance, no Candidiasis  A bit of presumed adhesive dermatitis and tiny skin tears at back  Chetna-ulcer skin: generally pink and indurated, less irritation at right BK stump, mild ecchymosis (or thinly covered granulation) at left heel, much improved discoloration at ischia.   Ulcer(s): T-spine red, hypergranular, a bit of fibrin and biofilm, stable hardware exposure and tunnels; left ischium smaller, more granular, less fibrotic, still some fibrin and superior undermining; right ischium in general looking much healthier, smaller, red, granular to hypergranular, fibrin and biofilm, still some undermining and tunneling toward the thigh, but the very narrow tunnel down along the lateral thigh is much deeper than previously known (> 15 cm); right BK stump wounds more granular, smaller, less depth, clean; left lower leg small, red, hypergranular; left foot mostly red and hypergranular, but once again with a small focus of exposed and unhealthy bone, medially again; no purulence. Photos also saved in electronic chart.     Today's wound measurements:  Wound 10/18/17 #28 Left heel, Pressure, Stage 4, onset 10/11/17, pressure-Wound Length (cm): 8 cm  Wound 12/06/18 #44 Left lateral lower leg, Pressure, Stage 3 (onset 12/4/18) Pressure-Wound Length (cm): 2.7 cm  Wound 02/06/19 #47, Right BKA amp site-Lateral, dehisced wound, full thickness, onset 2/5/19, gradually appeared-Wound Length (cm): 2 cm  Wound 02/06/19 #48, Right BKA amp. site-Medial, Dehisced Wound, full thickness, onset 2/5/19, gradually appeared-Wound Length (cm): 1 cm  Wound 01/23/19 #45, Left Ischium, Pressure, Stage 4, onset 1/16/19, Pressure -Wound Length (cm): 2 cm  Wound 08/16/17 #27- Thoracic spine, dehisced surgical wound, full thickness, onset 8/16/2017, pressure-Wound Length (cm): 7.7 cm  Wound 10/17/14 #8, right ischium, stage 4 PU, onset 7/15/14 (merged with #30), pressure-Wound Length (cm): 11 cm    Wound 10/18/17 #28 Left heel, Pressure, Stage 4, onset 10/11/17, pressure-Wound Width (cm): 4.5 cm  Wound 12/06/18 #44 Left lateral lower leg, Pressure, Stage 3 (onset 12/4/18) Pressure-Wound Width (cm): 1 cm  Wound 02/06/19 #47, Right BKA amp site-Lateral, dehisced wound, full thickness, onset 2/5/19, gradually appeared-Wound Width (cm): 4 cm  Wound 02/06/19 #48, Right BKA amp. site-Medial, Dehisced Wound, full thickness, onset 2/5/19, gradually appeared-Wound Width (cm): 3 cm  Wound 01/23/19 #45, Left Ischium, Pressure, Stage 4, onset 1/16/19, Pressure -Wound Width (cm): 2 cm  Wound 08/16/17 #27- Thoracic spine, dehisced surgical wound, full thickness, onset 8/16/2017, pressure-Wound Width (cm): 3.5 cm  Wound 10/17/14 #8, right ischium, stage 4 PU, onset 7/15/14 (merged with #30), pressure-Wound Width (cm): 5.4 cm    Wound 10/18/17 #28 Left heel, Pressure, Stage 4, onset 10/11/17, pressure-Wound Depth (cm): 0.1 cm  Wound 12/06/18 #44 Left lateral lower leg, Pressure, Stage 3 (onset 12/4/18) Pressure-Wound Depth (cm): 0.1 cm  Wound 02/06/19 #47, Right BKA amp site-Lateral, dehisced wound, full thickness, onset 2/5/19, gradually appeared-Wound Depth (cm): 1 cm  Wound 02/06/19 #48, Right BKA amp. site-Medial, Dehisced Wound, full thickness, onset 2/5/19, gradually appeared-Wound Depth (cm): 0.3 cm  Wound 01/23/19 #45, Left Ischium, Pressure, Stage 4, onset 1/16/19, Pressure -Wound Depth (cm): 0.6 cm  Wound 08/16/17 #27- Thoracic spine, dehisced surgical wound, full thickness, onset 8/16/2017, pressure-Wound Depth (cm): 1 cm  Wound 10/17/14 #8, right ischium, stage 4 PU, onset 7/15/14 (merged with #30), pressure-Wound Depth (cm): 4.5 cm    Assessment:     Patient Active Problem List   Diagnosis Code    Mixed hyperlipidemia E78.2    Coronary artery disease involving native coronary artery of native heart without angina pectoris I25.10    Type 2 diabetes mellitus with skin complication, with long-term current use of insulin ([facility]) E11.628, Z79.4    Paraplegia, complete ([facility] G82.21    Chronic back pain M54.9, G89.29    Arthritis M19.90    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    Pressure ulcer of left heel, stage 4 (HCC) L89.624    LIBORIO on CPAP G47.33, Z99.89    Below knee amputation status, right ([address] Z89.511    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, initial encounter T81.31XA    Pressure ulcer of left ischium, stage 4 (HCC) L89.324       Assessment of today's active condition(s): DM, paraplegia, multiple nonhealing wounds related to pressure, prior infections, prior surgeries; some concern for active osteomyelitis at the left heel, with recurrent bone exposure; offloading undoubtedly better; NPWT working well at the right BK stump. Factors contributing to occurrence and/or persistence of the chronic ulcer include lymphedema, diabetes, chronic pressure, decreased mobility, shear force and obesity. Medical necessity of today's visit is shown by the above documentation. Sharp debridement is indicated today, based upon the exam findings in the wound(s) above. Procedure note:     Consent obtained. Time out performed per [facility] policy. Anesthetic  Anesthetic: 4% Lidocaine Liquid Topical     Using a curette, I sharply debrided the back (midline), buttock (left and right) and lower leg (right and distal) ulcer(s) down through and including the removal of dermis. The type(s) of tissue debrided included fibrin, biofilm and exudate. Total Surface Area Debrided: approx 45 sq cm. Using a curette and rongeur, I sharply debrided the foot (left  and heel) ulcer(s) down through and including the removal of bone. The type(s) of tissue debrided included fibrin and necrotic/eschar. Total Surface Area Debrided: approx 5 sq cm. The ulcers were then irrigated with normal saline solution. The procedure was completed with a moderate amount of bleeding, and hemostasis was with pressure, with silver nitrate stick(s) and with ORC (cellulose). The patient tolerated the procedure well, with no significant complications.  The patient's level of pain during and after the procedure was monitored. Post-debridement measurements, if different from pre-debridement, are in the flowsheet as well.  ______________    To encourage better epithelial cell coverage, I did use AgNO3 to chemically cauterize hypergranulation tissue on the back and left leg ulcer(s), after application of 4% lidocaine topical solution. This was tolerated well, with no pain or skin injury. ______________    Because of this patient's paraplegia and resulting left sided heel pressure ulcer ulcer, I recommended a total contact cast as the optimal method to offload the ulcer and help it to heal in a timely manner. He has no active infection or gangrene, has adequate arterial flow to the pertinent extremity, does not have severe edema. After the ulcer was cleansed with saline and had a dressing applied (Mariana and gauze to the leg; Mariana, Adaptic, silver alginate and ABD to the heel), the wound care center staff applied the first few fabric layers of the cast system. I then personally applied the final fiberglass layers, being careful to keep the ankle at 90 degrees, and to custom-mold the cast to the patient's foot, ankle and leg. After additional drying time, the Medline offloading boot was replaced as well. Reminders were given regarding the importance of keeping the cast dry; information on a cast protector was offered. Mr. Ramone Diaz should call the wound care center, or his primary doctor or the emergency room if after hours, if he experiences fever or chills, a strong odor from within the cast, a significant sense of pistoning within the cast, or any other new concerning symptoms. Discharge plan:     Treatment in the wound care center today:   Wound 02/06/19 #47, Right BKA amp site-Lateral, dehisced wound, full thickness, onset 2/5/19, gradually appeared-Dressing/Treatment: Collagen with Ag, Vacuum dressing(idoform packing)  Wound 02/06/19 #48, Right BKA amp. site-Medial, Dehisced Wound, full thickness, onset 2/5/19, gradually appeared-Dressing/Treatment: Collagen with Ag, Vacuum dressing  Wound 01/23/19 #45, Left Ischium, Pressure, Stage 4, onset 1/16/19, Pressure -Dressing/Treatment: Other (comment)(calmospetine,flagylsilver Ag,drawtex,Dry Dressing)  Wound 08/16/17 #27- Thoracic spine, dehisced surgical wound, full thickness, onset 8/16/2017, pressure-Dressing/Treatment: Other (comment)(calmoseptine,flagyl,triamcinolone,silver Ag,drawtex,4x4,ABD)  Wound 10/17/14 #8, right ischium, stage 4 PU, onset 7/15/14 (merged with #30), pressure-Dressing/Treatment: Other (comment)(calmoseptine,flagyl,silver Ag,drawtex,4x4 fluff gauze,ABD). If bone exposure persists at the left heel next week, will get material for cultures and likely plan on a course of IV Abx. Continue great work with offloading, which unfortunately means maximum time in bed right now. Be sure to support right BK stump with a pillow to prevent knee contracture. Keep up good work with protein intake and glucose control. I placed a length of AMD packing into the right thigh tunnel today, and that should remain in place for the week. Might consider moving NPWT to the right ischium and this tunnel next week. If the T-spine ulcer at least remains stable, I'm postponing surgical consideration there until we make more progress with the other wounds that I think we CAN heal here. Home treatment: good handwashing before and after any dressing changes. Cleanse wound with saline or soap & water before dressing change. May use Vaseline (petrolatum), Aquaphor, Aveeno, CeraVe, Cetaphil, Eucerin, Lubriderm, etc for dry skin. Dressing type for home: as above for the T-spine, ischia and right BK stump three times weekly. Written discharge instructions given to patient. Follow up in 1 week.     Electronically signed by Lilo Camacho MD on 4/14/2019 at 11:41 AM.  +

## 2019-04-17 ENCOUNTER — HOSPITAL ENCOUNTER (OUTPATIENT)
Dept: WOUND CARE | Age: 54
Discharge: HOME | End: 2019-04-17
Payer: MEDICARE

## 2019-04-17 VITALS
TEMPERATURE: 97.2 F | RESPIRATION RATE: 18 BRPM | DIASTOLIC BLOOD PRESSURE: 56 MMHG | SYSTOLIC BLOOD PRESSURE: 90 MMHG | HEART RATE: 71 BPM | HEIGHT: 74 IN | BODY MASS INDEX: 29.58 KG/M2

## 2019-04-17 PROCEDURE — 17250 CHEM CAUT OF GRANLTJ TISSUE: CPT

## 2019-04-17 PROCEDURE — 97597 DBRDMT OPN WND 1ST 20 CM/<: CPT

## 2019-04-17 PROCEDURE — 97598 DBRDMT OPN WND ADDL 20CM/<: CPT | Performed by: INTERNAL MEDICINE

## 2019-04-17 PROCEDURE — 17250 CHEM CAUT OF GRANLTJ TISSUE: CPT | Performed by: INTERNAL MEDICINE

## 2019-04-17 PROCEDURE — 97605 NEG PRS WND THER DME<=50SQCM: CPT

## 2019-04-17 PROCEDURE — 29445 APPL RIGID TOT CNTC LEG CAST: CPT

## 2019-04-17 PROCEDURE — 97598 DBRDMT OPN WND ADDL 20CM/<: CPT

## 2019-04-17 PROCEDURE — 97597 DBRDMT OPN WND 1ST 20 CM/<: CPT | Performed by: INTERNAL MEDICINE

## 2019-04-17 PROCEDURE — 29445 APPL RIGID TOT CNTC LEG CAST: CPT | Performed by: INTERNAL MEDICINE

## 2019-04-17 RX ORDER — LIDOCAINE 40 MG/G
CREAM TOPICAL ONCE
Status: DISCONTINUED | OUTPATIENT
Start: 2019-04-17 | End: 2019-04-18 | Stop reason: HOSPADM

## 2019-04-17 ASSESSMENT — PAIN - FUNCTIONAL ASSESSMENT: PAIN_FUNCTIONAL_ASSESSMENT: PREVENTS OR INTERFERES SOME ACTIVE ACTIVITIES AND ADLS

## 2019-04-17 ASSESSMENT — PAIN DESCRIPTION - ONSET: ONSET: ON-GOING

## 2019-04-17 ASSESSMENT — PAIN DESCRIPTION - DESCRIPTORS: DESCRIPTORS: ACHING;BURNING

## 2019-04-17 ASSESSMENT — PAIN DESCRIPTION - PAIN TYPE: TYPE: CHRONIC PAIN

## 2019-04-17 ASSESSMENT — PAIN DESCRIPTION - ORIENTATION: ORIENTATION: LEFT

## 2019-04-17 ASSESSMENT — PAIN DESCRIPTION - FREQUENCY: FREQUENCY: CONTINUOUS

## 2019-04-17 ASSESSMENT — PAIN DESCRIPTION - LOCATION: LOCATION: NECK;SHOULDER

## 2019-04-17 ASSESSMENT — PAIN DESCRIPTION - PROGRESSION: CLINICAL_PROGRESSION: NOT CHANGED

## 2019-04-17 ASSESSMENT — PAIN SCALES - GENERAL: PAINLEVEL_OUTOF10: 3

## 2019-04-18 NOTE — PROGRESS NOTES
88 [facility] Progress Note    Nyasia Be     : 1965    DATE OF VISIT:  4/10/2019    Subjective:     Nyasia Be is a 48 y.o. male who has a diabetic  and  pressure ulcer located on the foot (left  and heel). Significant symptoms or pertinent wound history since last visit: feeling ok overall, no F/C/D (sometimes has a mild fever and episode of diaphoresis after debridement, but that's not new for him). No dizziness, CP, SOB, N/V/D. Doing a great job with avoiding time in his wheelchair, and with changing positions in bed. Additional ulcer(s) noted? yes. T-spine, BL ischia, left lower leg, right knee, right BK stump. His current medication list consists of Apixaban, Insulin Lispro, aspirin, cyclobenzaprine, docusate sodium, ferrous sulfate, insulin glargine, loratadine, magnesium oxide, metFORMIN, metoclopramide, metoprolol succinate, metroNIDAZOLE (topical PRN), nitroGLYCERIN, nystatin, oxyCODONE, pantoprazole, polyethylene glycol, promethazine, rosuvastatin, senna, torsemide, and traZODone HCl. Allergies: Benadryl [diphenhydramine hcl]; Statins [statins]; Cephalexin; Morphine; Penicillins; and Sulfa antibiotics    Objective:     Vitals:    04/10/19 1112   BP: 99/66   Pulse: 78   Resp: 18   Temp: 96.6 °F (35.9 °C)   TempSrc: Oral   Height: 6' 2\" (1.88 m)     AAOx3, overweight, fatigued, pale, in good spirits, NAD  Intact left pedal pulses, left foot and right BK stump warm, good cap refill  Mild-mod RLE edema  No cellulitis, angitis, fluctuance, acute arthritis or bursitis  Chetna-ulcer skin: generally pink and indurated, some adhesive dermatitis at back, minimal pressure erythema at ischia, mild dressing dermatitis at right knee, mild hyperkeratosis at left leg.   Ulcer(s): T-spine red, hypergranular, stable sinus tracts and hardware exposure, some fibrin and biofilm; left ischium smaller, more granular, fibrinous necrosis and biofilm; right ischium also with the from today -- cRP 196.7, WBC 12.5, Hgb 8.0, plts 511k, ESR > 120. 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    Pressure ulcer of left heel, stage 4 ([facility]) L89.624    LIBORIO on CPAP G47.33, Z99.89    Below knee amputation status, right ([facility]) Z89.511    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, initial encounter T81.31XA    Pressure ulcer of left ischium, stage 4 ([facility]) L89.324       Assessment of today's active condition(s): DM, paraplegia, multiple nonhealing wounds related to pressure, prior surgeries, prior infections, and now I think we have to assume chronic active osteomyelitis at the left heel; no signs of ischemia, no active soft tissue infection; could well have spinal osteomyelitis in the T-spine also, but Rx of that wound has been palliative, given the risks of hardware removal. Factors contributing to occurrence and/or persistence of the chronic ulcer include lymphedema, diabetes, chronic pressure, decreased mobility and shear force.  Offloading has been infinitely better in the last few weeks, and it really adequate arterial flow to the pertinent extremity, does not have severe edema. After the ulcer was cleansed with saline and had a dressing applied (Adaptic and foam to the left leg, Mariana / Adaptic / silver alginate / ABD pad to the left heel), the wound care center staff applied the first few fabric layers of the cast system. I then personally applied the final fiberglass layers, being careful to keep the ankle at 90 degrees, and to custom-mold the cast to the patient's foot, ankle and leg. After additional drying time, his Medline offloading boot was replaced as well. Reminders were given regarding the importance of keeping the cast dry; information on a cast protector was offered. Mr. Brad Fernández should call the wound care center, or his primary doctor or the emergency room if after hours, if he experiences fever or chills, a strong odor from within the cast, a significant sense of pistoning within the cast, or any other new concerning symptoms.      Discharge plan:     Treatment in the wound care center today: Wound 04/10/19 #49, Right Knee Anterior, Trauma, Partial Thickness, (onset 2018), gradually appeared-Dressing/Treatment: 4x4, Other (comment)(calmoseptine alin,hydrogel,kerlix,singleGtubi)  Wound 02/06/19 #47, Right BKA amp site-Lateral, dehisced wound, full thickness, onset 2/5/19, gradually appeared-Dressing/Treatment: Vacuum dressing, Other (comment)(iodoform)  Wound 02/06/19 #48, Right BKA amp. site-Medial, Dehisced Wound, full thickness, onset 2/5/19, gradually appeared-Dressing/Treatment: Other (comment)(mariana, mepilex border)  Wound 04/10/19 # 50, Right Medial Knee, Venous, Partial Thickness, (onset 2018) Gradually appeared-Dressing/Treatment: 4x4, Other (comment)(calmoseptine alin,hydrogel,kerlix,singleGtubi)  Wound 08/16/17 #27- Thoracic spine, dehisced surgical wound, full thickness, onset 8/16/2017, pressure-Dressing/Treatment: 4x4, ABD, Other (comment)(calmoseptine

## 2019-04-22 ENCOUNTER — HOSPITAL ENCOUNTER (OUTPATIENT)
Age: 54
Discharge: HOME | End: 2019-04-22
Payer: MEDICARE

## 2019-04-22 LAB
A/G RATIO: 0.6 (ref 1.1–2.2)
ALBUMIN SERPL-MCNC: 3 G/DL (ref 3.4–5)
ALP BLD-CCNC: 334 U/L (ref 40–129)
ALT SERPL-CCNC: 13 U/L (ref 10–40)
ANION GAP SERPL CALCULATED.3IONS-SCNC: 13 MMOL/L (ref 3–16)
AST SERPL-CCNC: 21 U/L (ref 15–37)
BASOPHILS ABSOLUTE: 0.2 K/UL (ref 0–0.2)
BASOPHILS RELATIVE PERCENT: 2.2 %
BILIRUB SERPL-MCNC: 0.3 MG/DL (ref 0–1)
BUN BLDV-MCNC: 36 MG/DL (ref 7–20)
C-REACTIVE PROTEIN: 44 MG/L (ref 0–5.1)
CALCIUM SERPL-MCNC: 8.8 MG/DL (ref 8.3–10.6)
CHLORIDE BLD-SCNC: 100 MMOL/L (ref 99–110)
CO2: 26 MMOL/L (ref 21–32)
CREAT SERPL-MCNC: 1.2 MG/DL (ref 0.9–1.3)
EOSINOPHILS ABSOLUTE: 0.5 K/UL (ref 0–0.6)
EOSINOPHILS RELATIVE PERCENT: 6 %
GFR AFRICAN AMERICAN: >60
GFR NON-AFRICAN AMERICAN: >60
GLOBULIN: 4.7 G/DL
GLUCOSE BLD-MCNC: 85 MG/DL (ref 70–99)
HCT VFR BLD CALC: 26.3 % (ref 40.5–52.5)
HEMOGLOBIN: 8.2 G/DL (ref 13.5–17.5)
LYMPHOCYTES ABSOLUTE: 1.3 K/UL (ref 1–5.1)
LYMPHOCYTES RELATIVE PERCENT: 16.7 %
MCH RBC QN AUTO: 24.2 PG (ref 26–34)
MCHC RBC AUTO-ENTMCNC: 31.3 G/DL (ref 31–36)
MCV RBC AUTO: 77.3 FL (ref 80–100)
MONOCYTES ABSOLUTE: 0.6 K/UL (ref 0–1.3)
MONOCYTES RELATIVE PERCENT: 7.5 %
NEUTROPHILS ABSOLUTE: 5.2 K/UL (ref 1.7–7.7)
NEUTROPHILS RELATIVE PERCENT: 67.6 %
PDW BLD-RTO: 20.4 % (ref 12.4–15.4)
PLATELET # BLD: 464 K/UL (ref 135–450)
PMV BLD AUTO: 8.7 FL (ref 5–10.5)
POTASSIUM SERPL-SCNC: 4.1 MMOL/L (ref 3.5–5.1)
RBC # BLD: 3.4 M/UL (ref 4.2–5.9)
SEDIMENTATION RATE, ERYTHROCYTE: 120 MM/HR (ref 0–20)
SODIUM BLD-SCNC: 139 MMOL/L (ref 136–145)
TOTAL PROTEIN: 7.7 G/DL (ref 6.4–8.2)
VANCOMYCIN TROUGH: 67.3 UG/ML (ref 10–20)
WBC # BLD: 7.7 K/UL (ref 4–11)

## 2019-04-22 PROCEDURE — 86140 C-REACTIVE PROTEIN: CPT

## 2019-04-22 PROCEDURE — 85652 RBC SED RATE AUTOMATED: CPT

## 2019-04-22 PROCEDURE — 85025 COMPLETE CBC W/AUTO DIFF WBC: CPT

## 2019-04-22 PROCEDURE — 80202 ASSAY OF VANCOMYCIN: CPT

## 2019-04-22 PROCEDURE — 36415 COLL VENOUS BLD VENIPUNCTURE: CPT

## 2019-04-22 PROCEDURE — 80053 COMPREHEN METABOLIC PANEL: CPT

## 2019-04-24 ENCOUNTER — HOSPITAL ENCOUNTER (OUTPATIENT)
Dept: WOUND CARE | Age: 54
Discharge: HOME | End: 2019-04-24
Payer: MEDICARE

## 2019-04-24 VITALS
HEIGHT: 74 IN | BODY MASS INDEX: 30.34 KG/M2 | SYSTOLIC BLOOD PRESSURE: 113 MMHG | RESPIRATION RATE: 18 BRPM | HEART RATE: 71 BPM | WEIGHT: 236.4 LBS | TEMPERATURE: 97.9 F | DIASTOLIC BLOOD PRESSURE: 76 MMHG

## 2019-04-24 LAB
ANION GAP SERPL CALCULATED.3IONS-SCNC: 15 MMOL/L (ref 3–16)
BUN BLDV-MCNC: 35 MG/DL (ref 7–20)
CALCIUM SERPL-MCNC: 9.2 MG/DL (ref 8.3–10.6)
CHLORIDE BLD-SCNC: 90 MMOL/L (ref 99–110)
CO2: 26 MMOL/L (ref 21–32)
CREAT SERPL-MCNC: 1.1 MG/DL (ref 0.9–1.3)
GFR AFRICAN AMERICAN: >60
GFR NON-AFRICAN AMERICAN: >60
GLUCOSE BLD-MCNC: 128 MG/DL (ref 70–99)
POTASSIUM SERPL-SCNC: 3.8 MMOL/L (ref 3.5–5.1)
SODIUM BLD-SCNC: 131 MMOL/L (ref 136–145)
VANCOMYCIN RANDOM: 42.9 UG/ML

## 2019-04-24 PROCEDURE — 17250 CHEM CAUT OF GRANLTJ TISSUE: CPT

## 2019-04-24 PROCEDURE — 80048 BASIC METABOLIC PNL TOTAL CA: CPT

## 2019-04-24 PROCEDURE — 99214 OFFICE O/P EST MOD 30 MIN: CPT | Performed by: INTERNAL MEDICINE

## 2019-04-24 PROCEDURE — 29445 APPL RIGID TOT CNTC LEG CAST: CPT

## 2019-04-24 PROCEDURE — 11044 DBRDMT BONE 1ST 20 SQ CM/<: CPT

## 2019-04-24 PROCEDURE — 11044 DBRDMT BONE 1ST 20 SQ CM/<: CPT | Performed by: INTERNAL MEDICINE

## 2019-04-24 PROCEDURE — 97597 DBRDMT OPN WND 1ST 20 CM/<: CPT | Performed by: INTERNAL MEDICINE

## 2019-04-24 PROCEDURE — 36415 COLL VENOUS BLD VENIPUNCTURE: CPT

## 2019-04-24 PROCEDURE — 97605 NEG PRS WND THER DME<=50SQCM: CPT

## 2019-04-24 PROCEDURE — 97597 DBRDMT OPN WND 1ST 20 CM/<: CPT

## 2019-04-24 PROCEDURE — 80202 ASSAY OF VANCOMYCIN: CPT

## 2019-04-24 PROCEDURE — 17250 CHEM CAUT OF GRANLTJ TISSUE: CPT | Performed by: INTERNAL MEDICINE

## 2019-04-24 RX ORDER — LIDOCAINE 40 MG/G
CREAM TOPICAL ONCE
Status: DISCONTINUED | OUTPATIENT
Start: 2019-04-24 | End: 2019-04-25 | Stop reason: HOSPADM

## 2019-04-24 ASSESSMENT — PAIN SCALES - GENERAL: PAINLEVEL_OUTOF10: 0

## 2019-04-25 ENCOUNTER — HOSPITAL ENCOUNTER (OUTPATIENT)
Age: 54
Discharge: HOME | End: 2019-04-25
Payer: MEDICARE

## 2019-04-25 LAB — VANCOMYCIN TROUGH: 33.9 UG/ML (ref 10–20)

## 2019-04-25 PROCEDURE — 36415 COLL VENOUS BLD VENIPUNCTURE: CPT

## 2019-04-25 PROCEDURE — 80202 ASSAY OF VANCOMYCIN: CPT

## 2019-04-25 NOTE — PROGRESS NOTES
and indurated, very mild erythema at right ischium. Ulcer(s): T-spine red, hypergranular, some fibrin and biofilm, still closing, obviously sinus tracts and hardware still present; left ischium smaller, red, granular, fibrin; right ischium smaller, red, granular to hypergranular, fibrin and biofilm, still undermining and the narrow tunnel along the lateral thigh, but depth of that tunnel is much less than last week; right knee cluster red, granular, fragile, friable; right BK stump wounds red, granular to slightly hypergranular, minimal fibrin, minimal superior undermining at the more lateral wound; left leg small, red, hypergranular; left heel still large, red, hypergranular, friable, areas of exposed bone, heavy drainage, no pus, no clear soft tissue necrosis. Photos also saved in electronic chart.     Today's wound measurements:  Wound 04/10/19 #49, Right Knee Anterior, Trauma, Partial Thickness, (onset 2018), gradually appeared-Wound Length (cm): 1.5 cm  Wound 04/10/19 # 50, Right Medial Knee, Venous, Partial Thickness, (onset 2018) Gradually appeared-Wound Length (cm): 1.5 cm  Wound 02/06/19 #47, Right BKA amp site-Lateral, dehisced wound, full thickness, onset 2/5/19, gradually appeared-Wound Length (cm): 1.7 cm  Wound 02/06/19 #48, Right BKA amp. site-Medial, Dehisced Wound, full thickness, onset 2/5/19, gradually appeared-Wound Length (cm): 0.7 cm  Wound 01/23/19 #45, Left Ischium, Pressure, Stage 4, onset 1/16/19, Pressure -Wound Length (cm): 1.5 cm  Wound 08/16/17 #27- Thoracic spine, dehisced surgical wound, full thickness, onset 8/16/2017, pressure-Wound Length (cm): 7.8 cm  Wound 10/17/14 #8, right ischium, stage 4 PU, onset 7/15/14 (merged with #30), pressure-Wound Length (cm): 10 cm  Wound 12/06/18 #44 Left lateral lower leg, Pressure, Stage 3 (onset 12/4/18) Pressure-Wound Length (cm): 2.4 cm  Wound 10/18/17 #28 Left heel, Pressure, Stage 4, onset 10/11/17, pressure-Wound Length (cm): 6.5 cm    Wound 04/10/19 #49, Right Knee Anterior, Trauma, Partial Thickness, (onset 2018), gradually appeared-Wound Width (cm): 1.4 cm  Wound 04/10/19 # 50, Right Medial Knee, Venous, Partial Thickness, (onset 2018) Gradually appeared-Wound Width (cm): 2 cm  Wound 02/06/19 #47, Right BKA amp site-Lateral, dehisced wound, full thickness, onset 2/5/19, gradually appeared-Wound Width (cm): 3.7 cm  Wound 02/06/19 #48, Right BKA amp. site-Medial, Dehisced Wound, full thickness, onset 2/5/19, gradually appeared-Wound Width (cm): 2.1 cm  Wound 01/23/19 #45, Left Ischium, Pressure, Stage 4, onset 1/16/19, Pressure -Wound Width (cm): 1.8 cm  Wound 08/16/17 #27- Thoracic spine, dehisced surgical wound, full thickness, onset 8/16/2017, pressure-Wound Width (cm): 3.5 cm  Wound 10/17/14 #8, right ischium, stage 4 PU, onset 7/15/14 (merged with #30), pressure-Wound Width (cm): 4 cm  Wound 12/06/18 #44 Left lateral lower leg, Pressure, Stage 3 (onset 12/4/18) Pressure-Wound Width (cm): 0.5 cm  Wound 10/18/17 #28 Left heel, Pressure, Stage 4, onset 10/11/17, pressure-Wound Width (cm): 4.5 cm    Wound 04/10/19 #49, Right Knee Anterior, Trauma, Partial Thickness, (onset 2018), gradually appeared-Wound Depth (cm): 0.1 cm  Wound 04/10/19 # 50, Right Medial Knee, Venous, Partial Thickness, (onset 2018) Gradually appeared-Wound Depth (cm): 0.1 cm  Wound 02/06/19 #47, Right BKA amp site-Lateral, dehisced wound, full thickness, onset 2/5/19, gradually appeared-Wound Depth (cm): 0.1 cm  Wound 02/06/19 #48, Right BKA amp. site-Medial, Dehisced Wound, full thickness, onset 2/5/19, gradually appeared-Wound Depth (cm): 0.1 cm  Wound 01/23/19 #45, Left Ischium, Pressure, Stage 4, onset 1/16/19, Pressure -Wound Depth (cm): 0.5 cm  Wound 08/16/17 #27- Thoracic spine, dehisced surgical wound, full thickness, onset 8/16/2017, pressure-Wound Depth (cm): 1.5 cm  Wound 10/17/14 #8, right ischium, stage 4 PU, onset 7/15/14 (merged with #30), pressure-Wound Depth (cm): 3.3 cm  Wound 12/06/18 #44 Left lateral lower leg, Pressure, Stage 3 (onset 12/4/18) Pressure-Wound Depth (cm): 0.1 cm  Wound 10/18/17 #28 Left heel, Pressure, Stage 4, onset 10/11/17, pressure-Wound Depth (cm): 0.4 cm   _________________    Left calcaneal bone Cx from last week -- MRSA, E facealis, Proteus (+ presumed anaerobes). Labs from last week -- baseline cRP 196.7, ESR > 120, WBC 12.5, Hgb 8.0, plts 511k. 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  T-spine is doing its best with a palliative care plan; ischial offloading is remarkably better; right BK stump coming along, without further need for NPWT; right knee and left leg should do ok with simple local care; left heel in need of Abx treatment of osteo, probably more bone debridement soon, ongoing offloading, etc. Factors contributing to occurrence and/or persistence of the chronic ulcer include lymphedema, diabetes, chronic pressure, decreased mobility, shear force and obesity. Medical necessity of today's visit is shown by the above documentation. Sharp debridement is indicated today, based upon the exam findings in the wound(s) above. Procedure note:     Consent obtained. Time out performed per [facility] policy. Anesthetic  Anesthetic: 4% Lidocaine Liquid Topical     Using a curette, I sharply debrided the back (midline) and buttock (left and right) ulcer(s) down through and including the removal of dermis. The type(s) of tissue debrided included fibrin, biofilm and exudate. Total Surface Area Debrided: approx 30 sq cm. The ulcers were then irrigated with normal saline solution. The procedure was completed with a moderate amount of bleeding, and hemostasis was with pressure and with silver nitrate stick(s). The patient tolerated the procedure well, with no significant complications. The patient's level of pain during and after the procedure was monitored. Post-debridement measurements, if different from pre-debridement, are in the flowsheet as well.  _________________    To encourage better epithelial cell coverage, I did use AgNO3 to chemically cauterize hypergranulation tissue on the left foot and left leg ulcer(s), after application of 4% lidocaine topical solution. This was tolerated well, with no pain or skin injury. _________________    Because of this patient's paraplegia and resulting left foot pressure ulcer, I recommended a total contact cast as the optimal method to offload the ulcer and help it to heal in a timely manner.  He has no untreated infection or any gangrene, has adequate arterial flow to the pertinent extremity, does not have severe edema. After the ulcer was cleansed with saline and had a dressing applied (Mariana, Adaptic, alginate, ABD pad to the heel, and Polysporin and Adaptic to the lower leg), the wound care center staff applied the first few fabric layers of the cast system. I then personally applied the final fiberglass layers, being careful to keep the ankle at 90 degrees, and to custom-mold the cast to the patient's foot, ankle and leg. After additional drying time, his Medline offloading boot was reapplied as well. Reminders were given regarding the importance of keeping the cast dry; information on a cast protector was offered. Mr. Ramone Diaz should call the wound care center, or his primary doctor or the emergency room if after hours, if he experiences fever or chills, a strong odor from within the cast, a significant sense of pistoning within the cast, or any other new concerning symptoms.      Discharge plan:     Treatment in the wound care center today: Wound 04/10/19 #49, Right Knee Anterior, Trauma, Partial Thickness, (onset 2018), gradually appeared-Dressing/Treatment: 4x4, Other (comment)(calmoseptine alin,hydrogel,kerlix,singleGtubi)  Wound 04/10/19 # 50, Right Medial Knee, Venous, Partial Thickness, (onset 2018) Gradually appeared-Dressing/Treatment: 4x4, Other (comment)(calmoseptine alin,hydrogel,kerlix,singleGtubi)  Wound 02/06/19 #47, Right BKA amp site-Lateral, dehisced wound, full thickness, onset 2/5/19, gradually appeared-Dressing/Treatment: Calmoseptine, Collagen with Ag, 4x4(ABD, kerlix, g- tubigrip)  Wound 02/06/19 #48, Right BKA amp. site-Medial, Dehisced Wound, full thickness, onset 2/5/19, gradually appeared-Dressing/Treatment: ABD, Calmoseptine, Collagen with Ag, 4x4(kerlix, tubigrip-g)  Wound 08/16/17 #27- Thoracic spine, dehisced surgical wound, full thickness, onset 8/16/2017, pressure-Dressing/Treatment: (calmoseptine triamcinolone silver alginate drawtex 4x4's ABD)  Wound 10/17/14 #8, right ischium, stage 4 PU, onset 7/15/14 (merged with #30), pressure-Dressing/Treatment: (AMD gauze into tunnel, then foam, drape, continuous negative pressure at 150 mmHg)  Left ischial wound -- Calmoseptine, silver alginate, Mepilex border. Continue IV vancomycin and ertapenem, tentative plan at least 6 weeks. Will watch closely for allergic manifestations, Candidiasis, Cdiff, Port complications, etc; weekly labs ordered, periodic Port dressing and needle changes being done per protocol. Keep up the excellent work with offloading, glucose control, protein intake. Home treatment: good handwashing before and after any dressing changes. Cleanse wound with saline or soap & water before dressing change. May use Vaseline (petrolatum), Aquaphor, Aveeno, CeraVe, Cetaphil, Eucerin, Lubriderm, etc for dry skin. Dressing type for home: hopefully the right knee and BK stump dressings can stay in place for the week; for the T-spine, left and right ischia, change as above (except do not reapply triamcinolone to spinal wound hypergranulation, and try to keep AMD packing in right ischial wound tunnel), three times weekly. Written discharge instructions given to patient. Follow up in 1 week.     Electronically signed by Jono Hernandez MD on 4/25/2019 at 10:35 AM.

## 2019-04-25 NOTE — PLAN OF CARE
Thoracic wound stable, Ag Nitrate used per MD, cont. With current wound care regime. Left ischial wound stable & showing improvement, no debridement, cont. With current wound care regime. Right Ischial wound showing improvement, debridement per MD, will attempt to cont. With NPWT as ordered. Pt. States his [place]  nurse was unable to have success with NPWT keeping a seal when she attempted on 4/20/19. Patient cont. With good off-loading & noted with wound improvement. Reinforced for pt. To cont. To not sit in his power chair & reposition frequently in bed from side to side. Right knee & stump wounds stable, improvement noted, no debridement, cont. With hydrogel & adaptic, dry dressing & tubigrip to help hold dressing in place. Pt. Cont. To not wear his knee immobilizer at this time d/t the trauma it was causing to the knee. Pt. Using a towel or pillow under knee for support at this time. Left heel wound with bone exposure noted & debridement per MD, cont. With current wound care regime & TCC for off-loading. Labs drawn today & Dr Mia Tineo will be in touch re:Vancomycin antibiotic, pt. To cont. With Augie Coles as ordered. F/u in [facility] in 1 week as ordered. Discharge instructions reviewed with patient, all questions answered, copy given to patient. Dressings were applied to all wounds per M.D. Instructions at this visit.

## 2019-04-30 DIAGNOSIS — Z79.4 TYPE 2 DIABETES MELLITUS WITH FOOT ULCER, WITH LONG-TERM CURRENT USE OF INSULIN (HCC): ICD-10-CM

## 2019-04-30 DIAGNOSIS — E11.621 TYPE 2 DIABETES MELLITUS WITH FOOT ULCER, WITH LONG-TERM CURRENT USE OF INSULIN (HCC): ICD-10-CM

## 2019-04-30 DIAGNOSIS — L97.509 TYPE 2 DIABETES MELLITUS WITH FOOT ULCER, WITH LONG-TERM CURRENT USE OF INSULIN (HCC): ICD-10-CM

## 2019-05-01 ENCOUNTER — HOSPITAL ENCOUNTER (OUTPATIENT)
Dept: NURSING | Age: 54
Setting detail: INFUSION SERIES
Discharge: HOME | End: 2019-05-01
Payer: MEDICARE

## 2019-05-01 ENCOUNTER — HOSPITAL ENCOUNTER (OUTPATIENT)
Dept: WOUND CARE | Age: 54
Discharge: HOME | End: 2019-05-01
Payer: MEDICARE

## 2019-05-01 VITALS
WEIGHT: 235.5 LBS | HEIGHT: 74 IN | RESPIRATION RATE: 20 BRPM | HEART RATE: 74 BPM | TEMPERATURE: 97.5 F | DIASTOLIC BLOOD PRESSURE: 49 MMHG | BODY MASS INDEX: 30.22 KG/M2 | SYSTOLIC BLOOD PRESSURE: 77 MMHG

## 2019-05-01 DIAGNOSIS — L97.911 TRAUMATIC ULCER OF LOWER EXTREMITY, RIGHT, LIMITED TO BREAKDOWN OF SKIN (HCC): ICD-10-CM

## 2019-05-01 PROCEDURE — 97597 DBRDMT OPN WND 1ST 20 CM/<: CPT

## 2019-05-01 PROCEDURE — 6360000002 HC RX W HCPCS: Performed by: INTERNAL MEDICINE

## 2019-05-01 PROCEDURE — 17250 CHEM CAUT OF GRANLTJ TISSUE: CPT

## 2019-05-01 PROCEDURE — 97605 NEG PRS WND THER DME<=50SQCM: CPT

## 2019-05-01 PROCEDURE — 97597 DBRDMT OPN WND 1ST 20 CM/<: CPT | Performed by: NURSE PRACTITIONER

## 2019-05-01 PROCEDURE — 36593 DECLOT VASCULAR DEVICE: CPT

## 2019-05-01 PROCEDURE — 97598 DBRDMT OPN WND ADDL 20CM/<: CPT

## 2019-05-01 PROCEDURE — 97605 NEG PRS WND THER DME<=50SQCM: CPT | Performed by: NURSE PRACTITIONER

## 2019-05-01 RX ORDER — LIDOCAINE 40 MG/G
CREAM TOPICAL ONCE
Status: DISCONTINUED | OUTPATIENT
Start: 2019-05-01 | End: 2019-05-02 | Stop reason: HOSPADM

## 2019-05-01 RX ADMIN — ALTEPLASE 2 MG: 2.2 INJECTION, POWDER, LYOPHILIZED, FOR SOLUTION INTRAVENOUS at 11:35

## 2019-05-01 RX ADMIN — ALTEPLASE 2 MG: 2.2 INJECTION, POWDER, LYOPHILIZED, FOR SOLUTION INTRAVENOUS at 10:30

## 2019-05-01 ASSESSMENT — PAIN SCALES - GENERAL: PAINLEVEL_OUTOF10: 0

## 2019-05-01 NOTE — PROGRESS NOTES
Patient has FBS of 112 this am, reports taking diabetic meds this am, did not eat breakfast this am.  Offered meal, he declines, stating, \" I'll let you know if I feel like my blood sugar is dropping\".

## 2019-05-01 NOTE — PLAN OF CARE
Right knee & stump wounds stable & overall showing some improvement, debridement of lateral stump wound & Ag Nitrate to knee wounds per provider, will cont. With current wound care regime. Thoracic & left ischial wounds stable, no debridment, will cont. With current wound care regime. Right ischial wound stable, tunnel remains noted, debridement & AMD strip gauze placed into tunnel per provider. Pt. States the NPWT was only in place from Wednesday to Saturday. C unable to get a seal with NPWT on Saturday & pt. States [facility] didn't reattempt on Monday. Will reapply NPWT as ordered & leave in place until Monday, if seal remains intact, otherwise HH to change as ordered. Left heel & lateral leg wounds stable, no bone exposure to heel wound his week, no debridement. Will cont. With primary dressings as ordered & change to using football dressing for off-loading this week. Pt. Has also cont. To use the off-loading boot as well. Pt. States he is only in chair for showers, otherwise in his bed. Reinforced importance of cont. With frequent repositioning from side to side as ordered & noted improvement in wounds with pt. Off-loading as ordered. F/u in [facility] in 1 week. Discharge instructions reviewed with patient, all questions answered, copy given to patient. Dressings were applied to all wounds per M.D. Instructions at this visit.

## 2019-05-01 NOTE — PROGRESS NOTES
Pt remains in wound care for dressing changes  Attempted to aspirate blood from port without success  Slight red tinged fluid noted with aspiration attempt  Pt agreeable for 2nd placement of cath onofre     Cath onofre 2 mg instilled into portacath without difficulty  Tubing clamped  Informed pt and wound care nurses that I will return in 1 hr or so to recheck port status  All agreeable

## 2019-05-01 NOTE — PROGRESS NOTES
Pt remains in wound care  I am able to now withdraw fluid and blood from the port 10 cc fluid then blood were removed from port and wasted then port was flushed with 30 cc NS using start stop method  slight resistance noted with flush possibly due to pt's position on the cart  Alcohol cap applied  Pt essie well  Discharge instructions reviewed with pt and copy was given  Pt reports they will use the port when he gets home and then they will heparinize afterwards  Pt will follow up with Dr. Esthela Alvares if any further issues with the port arise  Pt remains in wound care in stable condition
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
## 2019-05-01 NOTE — PROGRESS NOTES
Pt is currently being seen in wound care for dressing changes so I went to wound care to check pt's port status  Pt has a portacath in right chest that is accessed  pt reports the the port was re accessed on Monday 4-29-19 and it gave a small amt of blood return and it flushed easily but it hasn't given a blood return since then but they are continuing to use it for his daily antibiotic infusions  Port does flush easily with 30 cc NS but I am unable to get any blood return despite pt coughing deep breathing or arm movement Pt laying on stretcher at present time having his dressing changed  No swelling noted around port site  Pt reports that he can taste the saline with the flushing  Pt reports also that he is familiar with cath onofre and how it is used  Pt denies any questions Pt agreeable with having cath onofre instilled into port today  Cath onofre 2 mg instilled easily into port without difficulty  Tubing clamped alcohol cap applied to tip  Pt essie well  Pt and wound care nurses informed that I will return to check port in 1 hr  All agreeable

## 2019-05-02 ENCOUNTER — HOSPITAL ENCOUNTER (OUTPATIENT)
Age: 54
Setting detail: SPECIMEN
Discharge: HOME | End: 2019-05-02
Payer: MEDICARE

## 2019-05-02 LAB
A/G RATIO: 0.7 (ref 1.1–2.2)
ALBUMIN SERPL-MCNC: 3.2 G/DL (ref 3.4–5)
ALP BLD-CCNC: 176 U/L (ref 40–129)
ALT SERPL-CCNC: 6 U/L (ref 10–40)
ANION GAP SERPL CALCULATED.3IONS-SCNC: 12 MMOL/L (ref 3–16)
AST SERPL-CCNC: 15 U/L (ref 15–37)
BASOPHILS ABSOLUTE: 0.1 K/UL (ref 0–0.2)
BASOPHILS RELATIVE PERCENT: 1.3 %
BILIRUB SERPL-MCNC: 0.5 MG/DL (ref 0–1)
BUN BLDV-MCNC: 42 MG/DL (ref 7–20)
C-REACTIVE PROTEIN: 89.7 MG/L (ref 0–5.1)
CALCIUM SERPL-MCNC: 9.2 MG/DL (ref 8.3–10.6)
CHLORIDE BLD-SCNC: 98 MMOL/L (ref 99–110)
CO2: 25 MMOL/L (ref 21–32)
CREAT SERPL-MCNC: 1.3 MG/DL (ref 0.9–1.3)
EOSINOPHILS ABSOLUTE: 0.4 K/UL (ref 0–0.6)
EOSINOPHILS RELATIVE PERCENT: 4.2 %
GFR AFRICAN AMERICAN: >60
GFR NON-AFRICAN AMERICAN: 58
GLOBULIN: 4.5 G/DL
GLUCOSE BLD-MCNC: 128 MG/DL (ref 70–99)
HCT VFR BLD CALC: 26.5 % (ref 40.5–52.5)
HEMOGLOBIN: 8.4 G/DL (ref 13.5–17.5)
LYMPHOCYTES ABSOLUTE: 1.1 K/UL (ref 1–5.1)
LYMPHOCYTES RELATIVE PERCENT: 13.3 %
MCH RBC QN AUTO: 24.1 PG (ref 26–34)
MCHC RBC AUTO-ENTMCNC: 31.6 G/DL (ref 31–36)
MCV RBC AUTO: 76.2 FL (ref 80–100)
MONOCYTES ABSOLUTE: 0.8 K/UL (ref 0–1.3)
MONOCYTES RELATIVE PERCENT: 8.8 %
NEUTROPHILS ABSOLUTE: 6.2 K/UL (ref 1.7–7.7)
NEUTROPHILS RELATIVE PERCENT: 72.4 %
PDW BLD-RTO: 21.4 % (ref 12.4–15.4)
PLATELET # BLD: 357 K/UL (ref 135–450)
PMV BLD AUTO: 9 FL (ref 5–10.5)
POTASSIUM SERPL-SCNC: 4.2 MMOL/L (ref 3.5–5.1)
RBC # BLD: 3.48 M/UL (ref 4.2–5.9)
SEDIMENTATION RATE, ERYTHROCYTE: 115 MM/HR (ref 0–20)
SODIUM BLD-SCNC: 135 MMOL/L (ref 136–145)
TOTAL PROTEIN: 7.7 G/DL (ref 6.4–8.2)
VANCOMYCIN TROUGH: 21.3 UG/ML (ref 10–20)
WBC # BLD: 8.6 K/UL (ref 4–11)

## 2019-05-02 PROCEDURE — 80053 COMPREHEN METABOLIC PANEL: CPT

## 2019-05-02 PROCEDURE — 80202 ASSAY OF VANCOMYCIN: CPT

## 2019-05-02 PROCEDURE — 36415 COLL VENOUS BLD VENIPUNCTURE: CPT

## 2019-05-02 PROCEDURE — 85652 RBC SED RATE AUTOMATED: CPT

## 2019-05-02 PROCEDURE — 86140 C-REACTIVE PROTEIN: CPT

## 2019-05-02 PROCEDURE — 85025 COMPLETE CBC W/AUTO DIFF WBC: CPT

## 2019-05-03 DIAGNOSIS — L97.509 TYPE 2 DIABETES MELLITUS WITH FOOT ULCER, WITH LONG-TERM CURRENT USE OF INSULIN (HCC): ICD-10-CM

## 2019-05-03 DIAGNOSIS — Z79.4 TYPE 2 DIABETES MELLITUS WITH FOOT ULCER, WITH LONG-TERM CURRENT USE OF INSULIN (HCC): ICD-10-CM

## 2019-05-03 DIAGNOSIS — E11.621 TYPE 2 DIABETES MELLITUS WITH FOOT ULCER, WITH LONG-TERM CURRENT USE OF INSULIN (HCC): ICD-10-CM

## 2019-05-03 NOTE — TELEPHONE ENCOUNTER
----- Message from Francisco Templeton sent at 5/3/2019  9:45 AM EDT -----  Contact: pt [phone]  Pt called and said he received the wrong test strips for his glucometer the last time, he said his glucometer is a One Touch Verio and the test strips were for a different brand. He is requesting for you to prescribe him One Touch Verio test strips. [facility]- [facility]  Last appt. 12-13-18. Next appt. 8-30-19.

## 2019-05-03 NOTE — TELEPHONE ENCOUNTER
----- Message from Maegan Shea sent at 5/3/2019  9:47 AM EDT -----  Contact: pt  Pt is requesting a refill for Metformin  [facility]- [facility]  Last appt. 12-13-18. Next appt. 8-30-19.

## 2019-05-06 ENCOUNTER — HOSPITAL ENCOUNTER (OUTPATIENT)
Age: 54
Setting detail: SPECIMEN
Discharge: HOME | End: 2019-05-06
Payer: MEDICARE

## 2019-05-06 LAB
A/G RATIO: 0.7 (ref 1.1–2.2)
ALBUMIN SERPL-MCNC: 3.3 G/DL (ref 3.4–5)
ALP BLD-CCNC: 194 U/L (ref 40–129)
ALT SERPL-CCNC: 8 U/L (ref 10–40)
ANION GAP SERPL CALCULATED.3IONS-SCNC: 12 MMOL/L (ref 3–16)
AST SERPL-CCNC: 17 U/L (ref 15–37)
BASOPHILS ABSOLUTE: 0.1 K/UL (ref 0–0.2)
BASOPHILS RELATIVE PERCENT: 1.4 %
BILIRUB SERPL-MCNC: 0.3 MG/DL (ref 0–1)
BUN BLDV-MCNC: 33 MG/DL (ref 7–20)
C-REACTIVE PROTEIN: 45.4 MG/L (ref 0–5.1)
CALCIUM SERPL-MCNC: 9 MG/DL (ref 8.3–10.6)
CHLORIDE BLD-SCNC: 94 MMOL/L (ref 99–110)
CO2: 27 MMOL/L (ref 21–32)
CREAT SERPL-MCNC: 1 MG/DL (ref 0.9–1.3)
EOSINOPHILS ABSOLUTE: 0.7 K/UL (ref 0–0.6)
EOSINOPHILS RELATIVE PERCENT: 10.9 %
GFR AFRICAN AMERICAN: >60
GFR NON-AFRICAN AMERICAN: >60
GLOBULIN: 4.5 G/DL
GLUCOSE BLD-MCNC: 108 MG/DL (ref 70–99)
HCT VFR BLD CALC: 28.7 % (ref 40.5–52.5)
HEMOGLOBIN: 9.1 G/DL (ref 13.5–17.5)
LYMPHOCYTES ABSOLUTE: 1.2 K/UL (ref 1–5.1)
LYMPHOCYTES RELATIVE PERCENT: 18.5 %
MCH RBC QN AUTO: 24.5 PG (ref 26–34)
MCHC RBC AUTO-ENTMCNC: 31.8 G/DL (ref 31–36)
MCV RBC AUTO: 77.1 FL (ref 80–100)
MONOCYTES ABSOLUTE: 0.5 K/UL (ref 0–1.3)
MONOCYTES RELATIVE PERCENT: 7.2 %
NEUTROPHILS ABSOLUTE: 3.9 K/UL (ref 1.7–7.7)
NEUTROPHILS RELATIVE PERCENT: 62 %
PDW BLD-RTO: 20.7 % (ref 12.4–15.4)
PLATELET # BLD: 373 K/UL (ref 135–450)
PMV BLD AUTO: 9.2 FL (ref 5–10.5)
POTASSIUM SERPL-SCNC: 3.7 MMOL/L (ref 3.5–5.1)
RBC # BLD: 3.73 M/UL (ref 4.2–5.9)
SEDIMENTATION RATE, ERYTHROCYTE: 109 MM/HR (ref 0–20)
SODIUM BLD-SCNC: 133 MMOL/L (ref 136–145)
TOTAL PROTEIN: 7.8 G/DL (ref 6.4–8.2)
VANCOMYCIN TROUGH: 21.8 UG/ML (ref 10–20)
WBC # BLD: 6.3 K/UL (ref 4–11)

## 2019-05-06 PROCEDURE — 36415 COLL VENOUS BLD VENIPUNCTURE: CPT

## 2019-05-06 PROCEDURE — 85025 COMPLETE CBC W/AUTO DIFF WBC: CPT

## 2019-05-06 PROCEDURE — 80202 ASSAY OF VANCOMYCIN: CPT

## 2019-05-06 PROCEDURE — 80053 COMPREHEN METABOLIC PANEL: CPT

## 2019-05-06 PROCEDURE — 85652 RBC SED RATE AUTOMATED: CPT

## 2019-05-06 PROCEDURE — 86140 C-REACTIVE PROTEIN: CPT
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
## 2019-05-08 ENCOUNTER — HOSPITAL ENCOUNTER (OUTPATIENT)
Dept: WOUND CARE | Age: 54
Discharge: HOME | End: 2019-05-08
Payer: MEDICARE

## 2019-05-08 VITALS
BODY MASS INDEX: 29.63 KG/M2 | DIASTOLIC BLOOD PRESSURE: 60 MMHG | RESPIRATION RATE: 18 BRPM | WEIGHT: 230.8 LBS | SYSTOLIC BLOOD PRESSURE: 95 MMHG | TEMPERATURE: 97.8 F | HEART RATE: 65 BPM

## 2019-05-08 DIAGNOSIS — Z51.81 THERAPEUTIC DRUG MONITORING: ICD-10-CM

## 2019-05-08 PROCEDURE — 97597 DBRDMT OPN WND 1ST 20 CM/<: CPT | Performed by: INTERNAL MEDICINE

## 2019-05-08 PROCEDURE — 97597 DBRDMT OPN WND 1ST 20 CM/<: CPT

## 2019-05-08 PROCEDURE — 99214 OFFICE O/P EST MOD 30 MIN: CPT | Performed by: INTERNAL MEDICINE

## 2019-05-08 PROCEDURE — 97605 NEG PRS WND THER DME<=50SQCM: CPT

## 2019-05-08 PROCEDURE — 97598 DBRDMT OPN WND ADDL 20CM/<: CPT | Performed by: INTERNAL MEDICINE

## 2019-05-08 PROCEDURE — 17250 CHEM CAUT OF GRANLTJ TISSUE: CPT

## 2019-05-08 PROCEDURE — 17250 CHEM CAUT OF GRANLTJ TISSUE: CPT | Performed by: INTERNAL MEDICINE

## 2019-05-08 PROCEDURE — 97598 DBRDMT OPN WND ADDL 20CM/<: CPT

## 2019-05-08 RX ORDER — LIDOCAINE HYDROCHLORIDE 40 MG/ML
SOLUTION TOPICAL ONCE
Status: DISCONTINUED | OUTPATIENT
Start: 2019-05-08 | End: 2019-05-09 | Stop reason: HOSPADM

## 2019-05-08 RX ORDER — TRIAMCINOLONE ACETONIDE 1 MG/G
CREAM TOPICAL
Qty: 45 G | Refills: 0 | Status: SHIPPED | OUTPATIENT
Start: 2019-05-08 | End: 2019-11-06 | Stop reason: ALTCHOICE

## 2019-05-08 ASSESSMENT — PAIN DESCRIPTION - LOCATION: LOCATION: NECK;SHOULDER

## 2019-05-08 ASSESSMENT — PAIN SCALES - GENERAL
PAINLEVEL_OUTOF10: 3
PAINLEVEL_OUTOF10: 0

## 2019-05-08 ASSESSMENT — PAIN DESCRIPTION - PAIN TYPE: TYPE: CHRONIC PAIN

## 2019-05-08 ASSESSMENT — PAIN DESCRIPTION - ONSET: ONSET: ON-GOING

## 2019-05-08 ASSESSMENT — PAIN DESCRIPTION - DESCRIPTORS: DESCRIPTORS: BURNING

## 2019-05-08 ASSESSMENT — PAIN DESCRIPTION - ORIENTATION: ORIENTATION: LEFT

## 2019-05-08 ASSESSMENT — PAIN - FUNCTIONAL ASSESSMENT: PAIN_FUNCTIONAL_ASSESSMENT: PREVENTS OR INTERFERES SOME ACTIVE ACTIVITIES AND ADLS

## 2019-05-08 ASSESSMENT — PAIN DESCRIPTION - PROGRESSION: CLINICAL_PROGRESSION: NOT CHANGED

## 2019-05-08 ASSESSMENT — PAIN DESCRIPTION - FREQUENCY: FREQUENCY: CONTINUOUS

## 2019-05-09 ENCOUNTER — HOSPITAL ENCOUNTER (OUTPATIENT)
Age: 54
Setting detail: SPECIMEN
Discharge: HOME | End: 2019-05-09
Payer: MEDICARE

## 2019-05-09 LAB
BUN BLDV-MCNC: 40 MG/DL (ref 7–20)
CREAT SERPL-MCNC: 1.1 MG/DL (ref 0.9–1.3)
GFR AFRICAN AMERICAN: >60
GFR NON-AFRICAN AMERICAN: >60
VANCOMYCIN TROUGH: 29.4 UG/ML (ref 10–20)

## 2019-05-09 PROCEDURE — 36415 COLL VENOUS BLD VENIPUNCTURE: CPT

## 2019-05-09 PROCEDURE — 80202 ASSAY OF VANCOMYCIN: CPT

## 2019-05-09 PROCEDURE — 82565 ASSAY OF CREATININE: CPT

## 2019-05-09 PROCEDURE — 84520 ASSAY OF UREA NITROGEN: CPT

## 2019-05-09 NOTE — PLAN OF CARE
Thoracic wound stable, debridement & Ag Nitrate per MD, cont. With current wound care regime. Left ischial wound stable & showing improvement, cont. With current wound care regime. Left ischial wound stable & tunnel remains, packing placed per MD, cont. With NPWT as ordered. [facility] did have success this week with being able to keep a seal with NPWT dressing. Pt. Is cont. To remain out of his chair most of the day, states only up for showering. Reinforced importance of frequent repositioning from side to side. Right stump & knee wounds stable, debridement of stump wounds per MD, cont. With current wound care regime. Left heel wound stable, no bone exposure, no debridement, pt. Tolerated football dressing this week without any signs of pressure to heel. Leg wound stable, will change to using Procellera & Hydrogel to wound, otherwise pad well under football dressing. Pt. Cont. To use his heel off-loading boot on left. Cont. With Invanz & Vancomycin as ordered per Dr Nunu Roberson. F/u in [facility] in 1 week as ordered. Discharge instructions reviewed with patient, all questions answered, copy given to patient. Dressings were applied to all wounds per M.D. Instructions at this visit.

## 2019-05-14 ENCOUNTER — APPOINTMENT (OUTPATIENT)
Dept: GENERAL RADIOLOGY | Age: 54
End: 2019-05-14
Payer: MEDICARE

## 2019-05-14 ENCOUNTER — HOSPITAL ENCOUNTER (EMERGENCY)
Age: 54
Discharge: ANOTHER HOSPITAL | End: 2019-05-14
Attending: EMERGENCY MEDICINE
Payer: MEDICARE

## 2019-05-14 ENCOUNTER — APPOINTMENT (OUTPATIENT)
Dept: CT IMAGING | Age: 54
End: 2019-05-14
Payer: MEDICARE

## 2019-05-14 ENCOUNTER — HOSPITAL ENCOUNTER (OUTPATIENT)
Age: 54
Setting detail: OBSERVATION
Discharge: NURSING, REHAB OR LONG-TERM CARE | End: 2019-05-16
Attending: INTERNAL MEDICINE | Admitting: INTERNAL MEDICINE
Payer: MEDICARE

## 2019-05-14 VITALS
OXYGEN SATURATION: 100 % | TEMPERATURE: 98.1 F | SYSTOLIC BLOOD PRESSURE: 85 MMHG | HEART RATE: 70 BPM | WEIGHT: 230.8 LBS | BODY MASS INDEX: 29.62 KG/M2 | DIASTOLIC BLOOD PRESSURE: 47 MMHG | HEIGHT: 74 IN | RESPIRATION RATE: 19 BRPM

## 2019-05-14 DIAGNOSIS — I95.9 HYPOTENSION, UNSPECIFIED HYPOTENSION TYPE: ICD-10-CM

## 2019-05-14 DIAGNOSIS — I63.412 CEREBROVASCULAR ACCIDENT (CVA) DUE TO EMBOLISM OF LEFT MIDDLE CEREBRAL ARTERY (HCC): Primary | ICD-10-CM

## 2019-05-14 PROBLEM — G45.9 TIA (TRANSIENT ISCHEMIC ATTACK): Status: ACTIVE | Noted: 2019-05-14

## 2019-05-14 PROBLEM — Z51.81 THERAPEUTIC DRUG MONITORING: Status: ACTIVE | Noted: 2019-05-14

## 2019-05-14 LAB
A/G RATIO: 0.7 (ref 1.1–2.2)
ABO/RH: NORMAL
ALBUMIN SERPL-MCNC: 3.6 G/DL (ref 3.4–5)
ALP BLD-CCNC: 181 U/L (ref 40–129)
ALT SERPL-CCNC: 7 U/L (ref 10–40)
ANION GAP SERPL CALCULATED.3IONS-SCNC: 13 MMOL/L (ref 3–16)
ANTIBODY SCREEN: NORMAL
APTT: 40.6 SEC (ref 26–36)
AST SERPL-CCNC: 19 U/L (ref 15–37)
BACTERIA: ABNORMAL /HPF
BASE EXCESS VENOUS: 2.6 MMOL/L (ref -3–3)
BASOPHILS ABSOLUTE: 0.1 K/UL (ref 0–0.2)
BASOPHILS RELATIVE PERCENT: 1.3 %
BILIRUB SERPL-MCNC: 0.5 MG/DL (ref 0–1)
BILIRUBIN URINE: NEGATIVE
BLOOD, URINE: ABNORMAL
BUN BLDV-MCNC: 45 MG/DL (ref 7–20)
CALCIUM SERPL-MCNC: 9.5 MG/DL (ref 8.3–10.6)
CARBOXYHEMOGLOBIN: 8.6 % (ref 0–1.5)
CHLORIDE BLD-SCNC: 95 MMOL/L (ref 99–110)
CLARITY: ABNORMAL
CO2: 25 MMOL/L (ref 21–32)
COLOR: YELLOW
CREAT SERPL-MCNC: 1.2 MG/DL (ref 0.9–1.3)
EKG ATRIAL RATE: 68 BPM
EKG DIAGNOSIS: NORMAL
EKG P AXIS: 75 DEGREES
EKG P-R INTERVAL: 170 MS
EKG Q-T INTERVAL: 420 MS
EKG QRS DURATION: 78 MS
EKG QTC CALCULATION (BAZETT): 446 MS
EKG R AXIS: -21 DEGREES
EKG T AXIS: 36 DEGREES
EKG VENTRICULAR RATE: 68 BPM
EOSINOPHILS ABSOLUTE: 0.4 K/UL (ref 0–0.6)
EOSINOPHILS RELATIVE PERCENT: 4 %
EPITHELIAL CELLS, UA: ABNORMAL /HPF
GFR AFRICAN AMERICAN: >60
GFR NON-AFRICAN AMERICAN: >60
GLOBULIN: 4.9 G/DL
GLUCOSE BLD-MCNC: 158 MG/DL (ref 70–99)
GLUCOSE BLD-MCNC: 163 MG/DL (ref 70–99)
GLUCOSE URINE: NEGATIVE MG/DL
HCO3 VENOUS: 27.1 MMOL/L (ref 23–29)
HCT VFR BLD CALC: 28.8 % (ref 40.5–52.5)
HEMOGLOBIN: 9.3 G/DL (ref 13.5–17.5)
INR BLD: 2.17 (ref 0.86–1.14)
KETONES, URINE: NEGATIVE MG/DL
LACTIC ACID: 1.3 MMOL/L (ref 0.4–2)
LEUKOCYTE ESTERASE, URINE: ABNORMAL
LYMPHOCYTES ABSOLUTE: 1.2 K/UL (ref 1–5.1)
LYMPHOCYTES RELATIVE PERCENT: 12.9 %
MCH RBC QN AUTO: 24.8 PG (ref 26–34)
MCHC RBC AUTO-ENTMCNC: 32.2 G/DL (ref 31–36)
MCV RBC AUTO: 76.9 FL (ref 80–100)
METHEMOGLOBIN VENOUS: 0.3 %
MICROSCOPIC EXAMINATION: YES
MONOCYTES ABSOLUTE: 0.7 K/UL (ref 0–1.3)
MONOCYTES RELATIVE PERCENT: 7.5 %
NEUTROPHILS ABSOLUTE: 6.7 K/UL (ref 1.7–7.7)
NEUTROPHILS RELATIVE PERCENT: 74.3 %
NITRITE, URINE: NEGATIVE
O2 CONTENT, VEN: 11 VOL %
O2 SAT, VEN: 77 %
O2 THERAPY: ABNORMAL
OCCULT BLOOD DIAGNOSTIC: NORMAL
PCO2, VEN: 41.7 MMHG (ref 40–50)
PDW BLD-RTO: 20.7 % (ref 12.4–15.4)
PERFORMED ON: ABNORMAL
PH UA: 7.5 (ref 5–8)
PH VENOUS: 7.43 (ref 7.35–7.45)
PLATELET # BLD: 340 K/UL (ref 135–450)
PMV BLD AUTO: 9 FL (ref 5–10.5)
PO2, VEN: 40.7 MMHG (ref 25–40)
POTASSIUM REFLEX MAGNESIUM: 4.9 MMOL/L (ref 3.5–5.1)
PRO-BNP: ABNORMAL PG/ML (ref 0–124)
PROTEIN UA: NEGATIVE MG/DL
PROTHROMBIN TIME: 24.7 SEC (ref 9.8–13)
RBC # BLD: 3.74 M/UL (ref 4.2–5.9)
RBC UA: ABNORMAL /HPF (ref 0–2)
SODIUM BLD-SCNC: 133 MMOL/L (ref 136–145)
SPECIFIC GRAVITY UA: 1.01 (ref 1–1.03)
TCO2 CALC VENOUS: 28 MMOL/L
TOTAL PROTEIN: 8.5 G/DL (ref 6.4–8.2)
TROPONIN: 0.12 NG/ML
URINE TYPE: ABNORMAL
UROBILINOGEN, URINE: 0.2 E.U./DL
WBC # BLD: 9 K/UL (ref 4–11)
WBC UA: ABNORMAL /HPF (ref 0–5)
YEAST: PRESENT /HPF

## 2019-05-14 PROCEDURE — 85730 THROMBOPLASTIN TIME PARTIAL: CPT

## 2019-05-14 PROCEDURE — 96361 HYDRATE IV INFUSION ADD-ON: CPT

## 2019-05-14 PROCEDURE — 85610 PROTHROMBIN TIME: CPT

## 2019-05-14 PROCEDURE — 70496 CT ANGIOGRAPHY HEAD: CPT

## 2019-05-14 PROCEDURE — 2700000000 HC OXYGEN THERAPY PER DAY

## 2019-05-14 PROCEDURE — G0378 HOSPITAL OBSERVATION PER HR: HCPCS

## 2019-05-14 PROCEDURE — 94761 N-INVAS EAR/PLS OXIMETRY MLT: CPT

## 2019-05-14 PROCEDURE — 84484 ASSAY OF TROPONIN QUANT: CPT

## 2019-05-14 PROCEDURE — 71045 X-RAY EXAM CHEST 1 VIEW: CPT

## 2019-05-14 PROCEDURE — 6370000000 HC RX 637 (ALT 250 FOR IP): Performed by: EMERGENCY MEDICINE

## 2019-05-14 PROCEDURE — 81001 URINALYSIS AUTO W/SCOPE: CPT

## 2019-05-14 PROCEDURE — 2580000003 HC RX 258: Performed by: EMERGENCY MEDICINE

## 2019-05-14 PROCEDURE — 83880 ASSAY OF NATRIURETIC PEPTIDE: CPT

## 2019-05-14 PROCEDURE — 82803 BLOOD GASES ANY COMBINATION: CPT

## 2019-05-14 PROCEDURE — 86850 RBC ANTIBODY SCREEN: CPT

## 2019-05-14 PROCEDURE — 87040 BLOOD CULTURE FOR BACTERIA: CPT

## 2019-05-14 PROCEDURE — 85025 COMPLETE CBC W/AUTO DIFF WBC: CPT

## 2019-05-14 PROCEDURE — 70450 CT HEAD/BRAIN W/O DYE: CPT

## 2019-05-14 PROCEDURE — 6360000004 HC RX CONTRAST MEDICATION: Performed by: EMERGENCY MEDICINE

## 2019-05-14 PROCEDURE — 83605 ASSAY OF LACTIC ACID: CPT

## 2019-05-14 PROCEDURE — 80053 COMPREHEN METABOLIC PANEL: CPT

## 2019-05-14 PROCEDURE — 99285 EMERGENCY DEPT VISIT HI MDM: CPT

## 2019-05-14 PROCEDURE — 96360 HYDRATION IV INFUSION INIT: CPT

## 2019-05-14 PROCEDURE — 86900 BLOOD TYPING SEROLOGIC ABO: CPT

## 2019-05-14 PROCEDURE — 86901 BLOOD TYPING SEROLOGIC RH(D): CPT

## 2019-05-14 PROCEDURE — 70498 CT ANGIOGRAPHY NECK: CPT

## 2019-05-14 PROCEDURE — G0328 FECAL BLOOD SCRN IMMUNOASSAY: HCPCS

## 2019-05-14 RX ORDER — TRAZODONE HYDROCHLORIDE 50 MG/1
25 TABLET ORAL NIGHTLY
Status: DISCONTINUED | OUTPATIENT
Start: 2019-05-14 | End: 2019-05-16 | Stop reason: HOSPADM

## 2019-05-14 RX ORDER — CYCLOBENZAPRINE HCL 10 MG
10 TABLET ORAL 2 TIMES DAILY PRN
Status: DISCONTINUED | OUTPATIENT
Start: 2019-05-14 | End: 2019-05-16 | Stop reason: HOSPADM

## 2019-05-14 RX ORDER — NICOTINE POLACRILEX 4 MG
15 LOZENGE BUCCAL PRN
Status: DISCONTINUED | OUTPATIENT
Start: 2019-05-14 | End: 2019-05-16 | Stop reason: HOSPADM

## 2019-05-14 RX ORDER — PROMETHAZINE HYDROCHLORIDE 25 MG/1
25 TABLET ORAL EVERY 6 HOURS PRN
Status: DISCONTINUED | OUTPATIENT
Start: 2019-05-14 | End: 2019-05-16 | Stop reason: HOSPADM

## 2019-05-14 RX ORDER — METOPROLOL SUCCINATE 50 MG/1
100 TABLET, EXTENDED RELEASE ORAL DAILY
Status: DISCONTINUED | OUTPATIENT
Start: 2019-05-15 | End: 2019-05-16

## 2019-05-14 RX ORDER — LABETALOL HYDROCHLORIDE 5 MG/ML
10 INJECTION, SOLUTION INTRAVENOUS EVERY 10 MIN PRN
Status: DISCONTINUED | OUTPATIENT
Start: 2019-05-14 | End: 2019-05-16 | Stop reason: HOSPADM

## 2019-05-14 RX ORDER — 0.9 % SODIUM CHLORIDE 0.9 %
1000 INTRAVENOUS SOLUTION INTRAVENOUS ONCE
Status: COMPLETED | OUTPATIENT
Start: 2019-05-14 | End: 2019-05-14

## 2019-05-14 RX ORDER — INSULIN GLARGINE 100 [IU]/ML
50 INJECTION, SOLUTION SUBCUTANEOUS 2 TIMES DAILY
Status: DISCONTINUED | OUTPATIENT
Start: 2019-05-14 | End: 2019-05-16 | Stop reason: HOSPADM

## 2019-05-14 RX ORDER — SODIUM CHLORIDE 0.9 % (FLUSH) 0.9 %
10 SYRINGE (ML) INJECTION EVERY 12 HOURS SCHEDULED
Status: DISCONTINUED | OUTPATIENT
Start: 2019-05-14 | End: 2019-05-16 | Stop reason: HOSPADM

## 2019-05-14 RX ORDER — ASPIRIN 81 MG/1
81 TABLET ORAL DAILY
Status: DISCONTINUED | OUTPATIENT
Start: 2019-05-15 | End: 2019-05-14 | Stop reason: SDUPTHER

## 2019-05-14 RX ORDER — 0.9 % SODIUM CHLORIDE 0.9 %
1000 INTRAVENOUS SOLUTION INTRAVENOUS ONCE
Status: DISCONTINUED | OUTPATIENT
Start: 2019-05-14 | End: 2019-05-14

## 2019-05-14 RX ORDER — DEXTROSE MONOHYDRATE 25 G/50ML
12.5 INJECTION, SOLUTION INTRAVENOUS PRN
Status: DISCONTINUED | OUTPATIENT
Start: 2019-05-14 | End: 2019-05-16 | Stop reason: HOSPADM

## 2019-05-14 RX ORDER — SODIUM CHLORIDE 0.9 % (FLUSH) 0.9 %
10 SYRINGE (ML) INJECTION PRN
Status: DISCONTINUED | OUTPATIENT
Start: 2019-05-14 | End: 2019-05-16 | Stop reason: HOSPADM

## 2019-05-14 RX ORDER — DOCUSATE SODIUM 100 MG/1
200 CAPSULE, LIQUID FILLED ORAL DAILY
Status: DISCONTINUED | OUTPATIENT
Start: 2019-05-15 | End: 2019-05-16 | Stop reason: HOSPADM

## 2019-05-14 RX ORDER — SENNA PLUS 8.6 MG/1
1 TABLET ORAL 2 TIMES DAILY
Status: DISCONTINUED | OUTPATIENT
Start: 2019-05-14 | End: 2019-05-16 | Stop reason: HOSPADM

## 2019-05-14 RX ORDER — FERROUS SULFATE 325(65) MG
325 TABLET ORAL
Status: DISCONTINUED | OUTPATIENT
Start: 2019-05-15 | End: 2019-05-16

## 2019-05-14 RX ORDER — PANTOPRAZOLE SODIUM 40 MG/1
40 TABLET, DELAYED RELEASE ORAL DAILY
Status: DISCONTINUED | OUTPATIENT
Start: 2019-05-15 | End: 2019-05-16 | Stop reason: HOSPADM

## 2019-05-14 RX ORDER — TORSEMIDE 20 MG/1
40 TABLET ORAL DAILY
Status: DISCONTINUED | OUTPATIENT
Start: 2019-05-15 | End: 2019-05-16

## 2019-05-14 RX ORDER — DEXTROSE MONOHYDRATE 50 MG/ML
100 INJECTION, SOLUTION INTRAVENOUS PRN
Status: DISCONTINUED | OUTPATIENT
Start: 2019-05-14 | End: 2019-05-16 | Stop reason: HOSPADM

## 2019-05-14 RX ORDER — CETIRIZINE HYDROCHLORIDE 10 MG/1
10 TABLET ORAL DAILY
Status: DISCONTINUED | OUTPATIENT
Start: 2019-05-15 | End: 2019-05-16 | Stop reason: HOSPADM

## 2019-05-14 RX ORDER — POLYETHYLENE GLYCOL 3350 17 G/17G
17 POWDER, FOR SOLUTION ORAL DAILY
Status: DISCONTINUED | OUTPATIENT
Start: 2019-05-15 | End: 2019-05-16 | Stop reason: HOSPADM

## 2019-05-14 RX ORDER — ROSUVASTATIN CALCIUM 10 MG/1
20 TABLET, COATED ORAL NIGHTLY
Status: DISCONTINUED | OUTPATIENT
Start: 2019-05-14 | End: 2019-05-16 | Stop reason: HOSPADM

## 2019-05-14 RX ORDER — ASPIRIN 81 MG/1
81 TABLET, CHEWABLE ORAL NIGHTLY
Status: DISCONTINUED | OUTPATIENT
Start: 2019-05-14 | End: 2019-05-16 | Stop reason: HOSPADM

## 2019-05-14 RX ORDER — ACETAMINOPHEN 500 MG
1000 TABLET ORAL ONCE
Status: COMPLETED | OUTPATIENT
Start: 2019-05-14 | End: 2019-05-14

## 2019-05-14 RX ADMIN — SODIUM CHLORIDE 1000 ML: 9 INJECTION, SOLUTION INTRAVENOUS at 20:55

## 2019-05-14 RX ADMIN — ACETAMINOPHEN 1000 MG: 500 TABLET ORAL at 15:38

## 2019-05-14 RX ADMIN — SODIUM CHLORIDE 1000 ML: 9 INJECTION, SOLUTION INTRAVENOUS at 12:54

## 2019-05-14 RX ADMIN — IOPAMIDOL 85 ML: 755 INJECTION, SOLUTION INTRAVENOUS at 14:00

## 2019-05-14 RX ADMIN — SODIUM CHLORIDE 1000 ML: 9 INJECTION, SOLUTION INTRAVENOUS at 14:27

## 2019-05-14 ASSESSMENT — PAIN SCALES - GENERAL
PAINLEVEL_OUTOF10: 6
PAINLEVEL_OUTOF10: 6
PAINLEVEL_OUTOF10: 4

## 2019-05-14 ASSESSMENT — PAIN DESCRIPTION - LOCATION: LOCATION: HEAD

## 2019-05-14 ASSESSMENT — PAIN DESCRIPTION - PAIN TYPE: TYPE: ACUTE PAIN

## 2019-05-14 NOTE — ED NOTES
3107- Call placed to [place] [phone] for pt regarding pt wallet. No answer     Call was returned by Usama soares. ... Usama harmon said it was in the front pocket on book bag.       Loyda Feliciano  05/14/19 [id number] [address]as  05/14/19 [id number]

## 2019-05-14 NOTE — ED PROVIDER NOTES
CLOUDY (*)     Blood, Urine TRACE-INTACT (*)     Leukocyte Esterase, Urine TRACE (*)     All other components within normal limits    Narrative:     Performed at:  [place] [address],  [place]   Phone [phone]   BLOOD GAS, VENOUS - Abnormal; Notable for the following components:    pO2, Kerwin 40.7 (*)     Carboxyhemoglobin 8.6 (*)     All other components within normal limits    Narrative:     Performed at:  [place] [address],  [place]   Phone [phone]   MICROSCOPIC URINALYSIS - Abnormal; Notable for the following components:    RBC, UA 3-5 (*)     Bacteria, UA 1+ (*)     Yeast, UA Present (*)     All other components within normal limits    Narrative:     Performed at:  [place] [address],  [place]   Phone [phone]   POCT GLUCOSE - Abnormal; Notable for the following components:    POC Glucose 158 (*)     All other components within normal limits    Narrative:     Performed at:  [place] [address],  [place]   Phone [phone]   CULTURE BLOOD #1   CULTURE BLOOD #2   LACTIC ACID, PLASMA    Narrative:     Performed at:  [facility],  [place]   Phone [phone]   BLOOD OCCULT STOOL DIAGNOSTIC    Narrative:     ORDER#: [id number]                          ORDERED BY: Albert Up  SOURCE: Stool                              COLLECTED:  05/14/19 12:47  ANTIBIOTICS AT PATRICIA.:                      RECEIVED :  05/14/19 12:52  Performed at:  [facility],  [place]   Phone [phone]   ANTIBODY SCREEN    Narrative:     Performed at:  [place] [address],  [place]   Phone [phone]   ABO/RH

## 2019-05-14 NOTE — ED NOTES
Patient at [address], Nurse at bedside. Nurse Suha Langford checked patient bed linen for missing wallet, no wallet has been found at this time.       Nancy Galeas, RN  05/14/19 Farida 63, RN  05/14/19 Farida 63, RN  05/14/19 1968
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
## 2019-05-14 NOTE — ED NOTES
ETA of [facility] ambulance      [facility]  05/14/19 1824

## 2019-05-15 ENCOUNTER — APPOINTMENT (OUTPATIENT)
Dept: MRI IMAGING | Age: 54
End: 2019-05-15
Attending: INTERNAL MEDICINE
Payer: MEDICARE

## 2019-05-15 LAB
ANION GAP SERPL CALCULATED.3IONS-SCNC: 11 MMOL/L (ref 3–16)
BUN BLDV-MCNC: 38 MG/DL (ref 7–20)
CALCIUM SERPL-MCNC: 8.6 MG/DL (ref 8.3–10.6)
CHLORIDE BLD-SCNC: 96 MMOL/L (ref 99–110)
CHOLESTEROL, TOTAL: 99 MG/DL (ref 0–199)
CO2: 25 MMOL/L (ref 21–32)
CREAT SERPL-MCNC: 1.2 MG/DL (ref 0.9–1.3)
ESTIMATED AVERAGE GLUCOSE: 139.9 MG/DL
GFR AFRICAN AMERICAN: >60
GFR NON-AFRICAN AMERICAN: >60
GLUCOSE BLD-MCNC: 165 MG/DL (ref 70–99)
GLUCOSE BLD-MCNC: 188 MG/DL (ref 70–99)
GLUCOSE BLD-MCNC: 190 MG/DL (ref 70–99)
GLUCOSE BLD-MCNC: 253 MG/DL (ref 70–99)
GLUCOSE BLD-MCNC: 262 MG/DL (ref 70–99)
GLUCOSE BLD-MCNC: 305 MG/DL (ref 70–99)
HBA1C MFR BLD: 6.5 %
HCT VFR BLD CALC: 25.1 % (ref 40.5–52.5)
HDLC SERPL-MCNC: 21 MG/DL (ref 40–60)
HEMOGLOBIN: 8 G/DL (ref 13.5–17.5)
LDL CHOLESTEROL CALCULATED: 36 MG/DL
MAGNESIUM: 1.4 MG/DL (ref 1.8–2.4)
MCH RBC QN AUTO: 24.4 PG (ref 26–34)
MCHC RBC AUTO-ENTMCNC: 31.9 G/DL (ref 31–36)
MCV RBC AUTO: 76.4 FL (ref 80–100)
PDW BLD-RTO: 20.1 % (ref 12.4–15.4)
PERFORMED ON: ABNORMAL
PLATELET # BLD: 273 K/UL (ref 135–450)
PMV BLD AUTO: 8.5 FL (ref 5–10.5)
POTASSIUM REFLEX MAGNESIUM: 4.7 MMOL/L (ref 3.5–5.1)
RBC # BLD: 3.28 M/UL (ref 4.2–5.9)
SODIUM BLD-SCNC: 132 MMOL/L (ref 136–145)
TRIGL SERPL-MCNC: 209 MG/DL (ref 0–150)
TROPONIN: 0.1 NG/ML
TROPONIN: 0.1 NG/ML
VLDLC SERPL CALC-MCNC: 42 MG/DL
WBC # BLD: 7.3 K/UL (ref 4–11)

## 2019-05-15 PROCEDURE — 96366 THER/PROPH/DIAG IV INF ADDON: CPT

## 2019-05-15 PROCEDURE — 83735 ASSAY OF MAGNESIUM: CPT

## 2019-05-15 PROCEDURE — 96365 THER/PROPH/DIAG IV INF INIT: CPT

## 2019-05-15 PROCEDURE — 84484 ASSAY OF TROPONIN QUANT: CPT

## 2019-05-15 PROCEDURE — 97165 OT EVAL LOW COMPLEX 30 MIN: CPT

## 2019-05-15 PROCEDURE — G0378 HOSPITAL OBSERVATION PER HR: HCPCS

## 2019-05-15 PROCEDURE — 99220 PR INITIAL OBSERVATION CARE/DAY 70 MINUTES: CPT | Performed by: PSYCHIATRY & NEUROLOGY

## 2019-05-15 PROCEDURE — 80061 LIPID PANEL: CPT

## 2019-05-15 PROCEDURE — 97163 PT EVAL HIGH COMPLEX 45 MIN: CPT

## 2019-05-15 PROCEDURE — 85027 COMPLETE CBC AUTOMATED: CPT

## 2019-05-15 PROCEDURE — 94761 N-INVAS EAR/PLS OXIMETRY MLT: CPT

## 2019-05-15 PROCEDURE — 2580000003 HC RX 258: Performed by: NURSE PRACTITIONER

## 2019-05-15 PROCEDURE — 96375 TX/PRO/DX INJ NEW DRUG ADDON: CPT

## 2019-05-15 PROCEDURE — 2580000003 HC RX 258

## 2019-05-15 PROCEDURE — 83036 HEMOGLOBIN GLYCOSYLATED A1C: CPT

## 2019-05-15 PROCEDURE — 80048 BASIC METABOLIC PNL TOTAL CA: CPT

## 2019-05-15 PROCEDURE — 2700000000 HC OXYGEN THERAPY PER DAY

## 2019-05-15 PROCEDURE — 96367 TX/PROPH/DG ADDL SEQ IV INF: CPT

## 2019-05-15 PROCEDURE — 6360000002 HC RX W HCPCS: Performed by: NURSE PRACTITIONER

## 2019-05-15 PROCEDURE — 6370000000 HC RX 637 (ALT 250 FOR IP): Performed by: NURSE PRACTITIONER

## 2019-05-15 PROCEDURE — 2500000003 HC RX 250 WO HCPCS: Performed by: NURSE PRACTITIONER

## 2019-05-15 RX ORDER — BUTALBITAL, ACETAMINOPHEN AND CAFFEINE 50; 325; 40 MG/1; MG/1; MG/1
2 TABLET ORAL ONCE
Status: COMPLETED | OUTPATIENT
Start: 2019-05-15 | End: 2019-05-15

## 2019-05-15 RX ORDER — LORAZEPAM 2 MG/ML
1 INJECTION INTRAMUSCULAR ONCE
Status: COMPLETED | OUTPATIENT
Start: 2019-05-15 | End: 2019-05-15

## 2019-05-15 RX ORDER — SODIUM CHLORIDE 9 MG/ML
INJECTION, SOLUTION INTRAVENOUS
Status: COMPLETED
Start: 2019-05-15 | End: 2019-05-15

## 2019-05-15 RX ADMIN — INSULIN GLARGINE 50 UNITS: 100 INJECTION, SOLUTION SUBCUTANEOUS at 11:14

## 2019-05-15 RX ADMIN — SODIUM CHLORIDE 500 ML: 9 INJECTION, SOLUTION INTRAVENOUS at 06:44

## 2019-05-15 RX ADMIN — ASPIRIN 81 MG 81 MG: 81 TABLET ORAL at 00:56

## 2019-05-15 RX ADMIN — ASPIRIN 81 MG 81 MG: 81 TABLET ORAL at 22:02

## 2019-05-15 RX ADMIN — Medication 10 ML: at 00:55

## 2019-05-15 RX ADMIN — METRONIDAZOLE 500 MG: 500 INJECTION, SOLUTION INTRAVENOUS at 15:16

## 2019-05-15 RX ADMIN — APIXABAN 5 MG: 5 TABLET, FILM COATED ORAL at 00:55

## 2019-05-15 RX ADMIN — TRAZODONE HYDROCHLORIDE 25 MG: 50 TABLET ORAL at 22:02

## 2019-05-15 RX ADMIN — DOCUSATE SODIUM 200 MG: 100 CAPSULE, LIQUID FILLED ORAL at 09:02

## 2019-05-15 RX ADMIN — TORSEMIDE 40 MG: 20 TABLET ORAL at 09:02

## 2019-05-15 RX ADMIN — ROSUVASTATIN CALCIUM 20 MG: 10 TABLET, FILM COATED ORAL at 22:02

## 2019-05-15 RX ADMIN — SENNOSIDES 8.6 MG: 8.6 TABLET, FILM COATED ORAL at 22:02

## 2019-05-15 RX ADMIN — ROSUVASTATIN CALCIUM 20 MG: 10 TABLET, FILM COATED ORAL at 00:55

## 2019-05-15 RX ADMIN — METRONIDAZOLE 500 MG: 500 INJECTION, SOLUTION INTRAVENOUS at 22:02

## 2019-05-15 RX ADMIN — APIXABAN 5 MG: 5 TABLET, FILM COATED ORAL at 09:02

## 2019-05-15 RX ADMIN — CETIRIZINE HYDROCHLORIDE 10 MG: 10 TABLET, FILM COATED ORAL at 09:02

## 2019-05-15 RX ADMIN — METRONIDAZOLE 500 MG: 500 INJECTION, SOLUTION INTRAVENOUS at 06:45

## 2019-05-15 RX ADMIN — SENNOSIDES 8.6 MG: 8.6 TABLET, FILM COATED ORAL at 00:56

## 2019-05-15 RX ADMIN — APIXABAN 5 MG: 5 TABLET, FILM COATED ORAL at 22:02

## 2019-05-15 RX ADMIN — MEROPENEM 1 G: 1 INJECTION, POWDER, FOR SOLUTION INTRAVENOUS at 19:00

## 2019-05-15 RX ADMIN — INSULIN LISPRO 2 UNITS: 100 INJECTION, SOLUTION INTRAVENOUS; SUBCUTANEOUS at 22:03

## 2019-05-15 RX ADMIN — VANCOMYCIN HYDROCHLORIDE 1250 MG: 10 INJECTION, POWDER, LYOPHILIZED, FOR SOLUTION INTRAVENOUS at 19:52

## 2019-05-15 RX ADMIN — FERROUS SULFATE TAB 325 MG (65 MG ELEMENTAL FE) 325 MG: 325 (65 FE) TAB at 09:02

## 2019-05-15 RX ADMIN — POLYETHYLENE GLYCOL (3350) 17 G: 17 POWDER, FOR SOLUTION ORAL at 09:03

## 2019-05-15 RX ADMIN — INSULIN GLARGINE 50 UNITS: 100 INJECTION, SOLUTION SUBCUTANEOUS at 22:03

## 2019-05-15 RX ADMIN — VANCOMYCIN HYDROCHLORIDE 1250 MG: 10 INJECTION, POWDER, LYOPHILIZED, FOR SOLUTION INTRAVENOUS at 08:14

## 2019-05-15 RX ADMIN — INSULIN GLARGINE 50 UNITS: 100 INJECTION, SOLUTION SUBCUTANEOUS at 00:56

## 2019-05-15 RX ADMIN — MEROPENEM 1 G: 1 INJECTION, POWDER, FOR SOLUTION INTRAVENOUS at 09:02

## 2019-05-15 RX ADMIN — BUTALBITAL, ACETAMINOPHEN, AND CAFFEINE 2 TABLET: 50; 325; 40 TABLET ORAL at 02:42

## 2019-05-15 RX ADMIN — PANTOPRAZOLE SODIUM 40 MG: 40 TABLET, DELAYED RELEASE ORAL at 09:02

## 2019-05-15 RX ADMIN — SENNOSIDES 8.6 MG: 8.6 TABLET, FILM COATED ORAL at 09:02

## 2019-05-15 RX ADMIN — LORAZEPAM 1 MG: 2 INJECTION, SOLUTION INTRAMUSCULAR; INTRAVENOUS at 09:30

## 2019-05-15 ASSESSMENT — PAIN SCALES - GENERAL
PAINLEVEL_OUTOF10: 0
PAINLEVEL_OUTOF10: 6
PAINLEVEL_OUTOF10: 3
PAINLEVEL_OUTOF10: 3
PAINLEVEL_OUTOF10: 5

## 2019-05-15 ASSESSMENT — PAIN DESCRIPTION - PROGRESSION: CLINICAL_PROGRESSION: NOT CHANGED

## 2019-05-15 ASSESSMENT — PAIN DESCRIPTION - PAIN TYPE
TYPE: ACUTE PAIN
TYPE: ACUTE PAIN

## 2019-05-15 ASSESSMENT — PAIN DESCRIPTION - LOCATION
LOCATION: HAND
LOCATION: HAND

## 2019-05-15 ASSESSMENT — PAIN DESCRIPTION - FREQUENCY: FREQUENCY: CONTINUOUS

## 2019-05-15 ASSESSMENT — PAIN - FUNCTIONAL ASSESSMENT: PAIN_FUNCTIONAL_ASSESSMENT: ACTIVITIES ARE NOT PREVENTED
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
## 2019-05-15 NOTE — DISCHARGE INSTR - DIET
 Good nutrition is important when healing from an illness, injury, or surgery. Follow any nutrition recommendations given to you during your hospital stay.  If you were given an oral nutrition supplement while in the hospital, continue to take this supplement at home. You can take it with meals, in-between meals, and/or before bedtime. These supplements can be purchased at most local grocery stores, pharmacies, and chain super-stores.  If you have any questions about your diet or nutrition, call the hospital and ask for the dietitian.       Cardiac
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
## 2019-05-15 NOTE — CONSULTS
Pharmacy to Dose Vancomycin    Dx: Cellulitis/wound infection? Goal trough = > 10 mcg/mL   Pt wt = 104.7 kg ~  CrCl cannot be calculated (Unknown ideal weight.). Start Vancomycin 1250 mg IVPB q12h on 5/15 at 0700  Vancomycin trough prior to 4th dose (5/16 at 1800).   Estefanía Hutchinson, PharmD  5/15/2019 6:43 AM

## 2019-05-15 NOTE — PROGRESS NOTES
4 Eyes Skin Assessment     The patient is being assess for  Admission    I agree that 2 RN's have performed a thorough Head to Toe Skin Assessment on the patient. ALL assessment sites listed below have been assessed. Areas assessed by both nurses: Yes  [x]   Head, Face, and Ears   [x]   Shoulders, Back, and Chest  [x]   Arms, Elbows, and Hands   [x]   Coccyx, Sacrum, and IschIum  [x]   Legs, Feet, and Heels        Does the Patient have Skin Breakdown?   Yes [facility] WOUND CARE was Initiated documentation include the Chetna-wound, Wound Assessment, Measurements, Dressing Treatment, Drainage, and Color\",         Ismael Prevention initiated:  Yes   Wound Care Orders initiated:  Yes      [address]  nurse consulted for Pressure Injury (Stage 3,4, Unstageable, DTI, NWPT, and Complex wounds), New and Established Ostomies:  Yes      Nurse 1 eSignature: Electronically signed by Julio César Merino RN on 5/15/19 at 4:03 AM    **SHARE this note so that the co-signing nurse is able to place an eSignature**    Nurse 2 eSignature: Electronically signed by Pat Fernandez RN on 5/16/19 at 4:57 AM

## 2019-05-15 NOTE — PROGRESS NOTES
below knee (Right, 01/15/2019); Leg amputation below knee (Right, 1/15/2019); Tunneled venous port placement (Right, 01/17/2019); and INSERTION / REMOVAL / REPLACEMENT VENOUS ACCESS CATHETER (Right, 1/17/2019). Restrictions: Up with Assist       Subjective   General  Chart Reviewed: Yes  Patient assessed for rehabilitation services?: Yes  Family / Caregiver Present: Yes(son)  Referring Practitioner: DARIAN Valencia CNP  Diagnosis: Headache, slurred speech, difficulty finding words, concern for TIA  Subjective  Subjective: RN cleared pt for tx. Pt supine at session start. Pain Assessment  Pain Assessment: 0-10  Pain Level: 3  Pain Type: Acute pain  Pain Location: Hand  Pain Frequency: Continuous  Clinical Progression: Not changed  Functional Pain Assessment: Activities are not prevented  Non-Pharmaceutical Pain Intervention(s): Ambulation/Increased Activity; Emotional support;Repositioned; Rest  Response to Pain Intervention: Patient Satisfied     Social/Functional History  Social/Functional History  Lives With: Spouse  Bathroom Shower/Tub: Walk-in shower  Home Equipment: Wheelchair-electric(prateek lift, hospital bed)  ADL Assistance: Needs assistance  Bath: Stand by assistance(set-up on special shower chair)  Dressing: Maximum assistance  Grooming: Independent  Feeding: Independent  Toileting: Independent(set-up assist for colostomy and straight cath [employer])  Homemaking Responsibilities: No  Ambulation Assistance: Needs assistance(Non-ambulatory)  Transfer Assistance: Needs assistance(prateek for t/fs)  Active : No  Additional Comments: Aide 9-3pm 7d/wk       Objective   Vision: Impaired  Vision Exceptions: Wears glasses for distance  Hearing: Within functional limits      Orientation  Overall Orientation Status: Within Functional Limits              Tone RUE  RUE Tone: Normotonic  Tone LUE  LUE Tone: Normotonic  Coordination  Movements Are Fluid And Coordinated: Yes(for BUE via

## 2019-05-15 NOTE — CARE COORDINATION
CASE MANAGEMENT INITIAL ASSESSMENT      Reviewed chart and met with patient today, re: potential discharge needs   Explained Case Management role/services. Yes     Family present: yes  Primary contact information: Violeta Zapata [phone]    Admit date/status: 5/14/19 Observation   Diagnosis: TIA     Insurance: Medicare primary, [address] [place] Dual secondary   Precert required for SNF - n/a         3 night stay required - n/a    Living arrangements, Adls, care needs, prior to admission: lives at home with his wife     Transportation: will need transport home when discharged     [facility] at home: Walker__Cane__RTS__ BSC__Shower Chair__  02__ HHN__ CPAP_X_  BiPap__  Hospital Bed_X_ W/C_X__ Other_hoyer lift, Life Alert, wound vac_________    Services in the home and/or outpatient, prior to admission: home care through [address] [place] [address] (if applicable)   · Name:  · Address:  · Dialysis Schedule:  · Phone:  · Fax:    PT/OT recs: 24 hour supervision or [address] Notification (HEN): not initiated     Barriers to discharge: none     Plan/comments: Patient is active with [facility]. He wants to resume with them on discharge. Patient has BKA but no prosthesis as of yet. Will follow for additional needs and arrange for resumption of care through [facility] upon discharge.      [facility] on chart for MD signature
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
## 2019-05-15 NOTE — PROGRESS NOTES
Patient returned from MRI. Tech states patient unable to fit in MRI d/t Kyphosis of neck. Dr. April Elizabeth and Neurology made aware.

## 2019-05-15 NOTE — DISCHARGE INSTR - COC
Pressure, Stage 4, onset 10/11/17, pressure (Active)   Wound Pressure Stage  4 5/8/2019 10:51 AM   Dressing Status Clean;Dry; Intact 5/8/2019 11:45 AM   Dressing Changed Changed/New 5/8/2019 11:45 AM   Dressing/Treatment ABD;Collagen with Ag;Dry Dressing;Non adherent 4/17/2019  2:09 PM   Wound Cleansed Rinsed/Irrigated with saline 5/8/2019 11:45 AM   Wound Length (cm) 5 cm 5/8/2019 10:51 AM   Wound Width (cm) 4 cm 5/8/2019 10:51 AM   Wound Depth (cm) 0.2 cm 5/8/2019 10:51 AM   Wound Surface Area (cm^2) 20 cm^2 5/8/2019 10:51 AM   Change in Wound Size % (l*w) 30.07 5/8/2019 10:51 AM   Wound Volume (cm^3) 4 cm^3 5/8/2019 10:51 AM   Wound Healing % 30 5/8/2019 10:51 AM   Post-Procedure Length (cm) 5 cm 5/8/2019 11:34 AM   Post-Procedure Width (cm) 4 cm 5/8/2019 11:34 AM   Post-Procedure Depth (cm) 0.2 cm 5/8/2019 11:34 AM   Post-Procedure Surface Area (cm^2) 20 cm^2 5/8/2019 11:34 AM   Post-Procedure Volume (cm^3) 4 cm^3 5/8/2019 11:34 AM   Distance Tunneling (cm) 0 cm 5/8/2019 11:34 AM   Undermining Maxium Distance (cm) 0 5/8/2019 11:34 AM   Wound Assessment Granulation tissue; Red 5/8/2019 10:51 AM   Drainage Amount Moderate 5/8/2019 10:51 AM   Drainage Description Serosanguinous 5/8/2019 10:51 AM   Odor None 5/8/2019 10:51 AM   Margins Attached edges 5/8/2019 10:51 AM   Exposed structure Bone 4/24/2019  1:01 PM   Chetna-wound Assessment Intact 5/8/2019 10:51 AM   Number of days: 998       Wound 08/16/17 #27- Thoracic spine, dehisced surgical wound, full thickness, onset 8/16/2017, pressure (Active)   Wound Image   5/15/2019  1:23 AM   Wound Non-Healing Surgical 5/8/2019 11:02 AM   Dressing Status Clean;Dry; Intact 5/15/2019  1:23 AM   Dressing Changed Changed/New 5/15/2019  1:23 AM   Wound Cleansed Rinsed/Irrigated with saline 5/8/2019 11:02 AM   Wound Length (cm) 7.2 cm 5/8/2019 11:02 AM   Wound Width (cm) 2 cm 5/8/2019 11:02 AM   Wound Depth (cm) 0.6 cm 5/8/2019 11:02 AM   Wound Surface Area (cm^2) 14.4 cm^2 5/8/2019 11:02 AM   Change in Wound Size % (l*w) 55 5/8/2019 11:02 AM   Wound Volume (cm^3) 8.64 cm^3 5/8/2019 11:02 AM   Wound Healing % 82 5/8/2019 11:02 AM   Post-Procedure Length (cm) 7.2 cm 5/8/2019 11:41 AM   Post-Procedure Width (cm) 2 cm 5/8/2019 11:41 AM   Post-Procedure Depth (cm) 0.6 cm 5/8/2019 11:41 AM   Post-Procedure Surface Area (cm^2) 14.4 cm^2 5/8/2019 11:41 AM   Post-Procedure Volume (cm^3) 8.64 cm^3 5/8/2019 11:41 AM   Distance Tunneling (cm) 0.8 cm 5/8/2019 11:41 AM   Tunneling Position ___ O'Clock 6 5/8/2019 11:41 AM   Undermining Starts ___ O'Clock at 6 oclock 5/1/2019 10:08 AM   Undermining Ends___ O'Clock 6 4/17/2019 10:32 AM   Undermining Maxium Distance (cm) 0 5/8/2019 11:41 AM   Wound Assessment Epithelialization 5/8/2019 11:02 AM   Drainage Amount Small 5/8/2019 11:02 AM   Drainage Description Serous 5/8/2019 11:02 AM   Odor None 5/8/2019 11:02 AM   Margins Unattached edges 4/24/2019 11:05 AM   Chetna-wound Assessment Fragile 5/8/2019 11:02 AM   Number of days: 223       Wound 10/17/14 #8, right ischium, stage 4 PU, onset 7/15/14 (merged with #30), pressure (Active)   Wound Image   5/1/2019 10:08 AM   Wound Pressure Stage  4 5/8/2019 11:02 AM   Dressing Status Clean;Dry; Intact 5/8/2019 12:45 PM   Dressing Changed Changed/New 5/8/2019 12:45 PM   Wound Cleansed Rinsed/Irrigated with saline 5/8/2019 11:02 AM   Wound Length (cm) 9 cm 5/8/2019 11:02 AM   Wound Width (cm) 2.5 cm 5/8/2019 11:02 AM   Wound Depth (cm) 1.7 cm 5/8/2019 11:02 AM   Wound Surface Area (cm^2) 22.5 cm^2 5/8/2019 11:02 AM   Change in Wound Size % (l*w) 79.58 5/8/2019 11:02 AM   Wound Volume (cm^3) 38.25 cm^3 5/8/2019 11:02 AM   Wound Healing % 83 5/8/2019 11:02 AM   Post-Procedure Length (cm) 9 cm 5/8/2019 11:41 AM   Post-Procedure Width (cm) 2.5 cm 5/8/2019 11:41 AM   Post-Procedure Depth (cm) 1.7 cm 5/8/2019 11:41 AM   Post-Procedure Surface Area (cm^2) 22.5 cm^2 5/8/2019 11:41 AM   Post-Procedure Volume (cm^3) 38.25 cm^3 5/8/2019 11:41 AM   Distance Tunneling (cm) 12.3 cm 5/8/2019 11:41 AM   Tunneling Position ___ O'Clock 6 5/8/2019 11:41 AM   Undermining Starts ___ O'Clock 0 5/1/2019 10:08 AM   Undermining Ends___ O'Clock 0 5/1/2019 10:08 AM   Undermining Maxium Distance (cm) 0 5/8/2019 11:41 AM   Wound Assessment Red 5/8/2019 11:02 AM   Drainage Amount Small 5/8/2019 11:02 AM   Drainage Description Serosanguinous 5/8/2019 11:02 AM   Odor None 5/8/2019 11:02 AM   Margins Unattached edges 5/8/2019 11:02 AM   Chetna-wound Assessment Pink 5/8/2019 11:02 AM   Number of days: 9961       Wound 12/06/18 #44 Left lateral lower leg, Pressure, Stage 3 (onset 12/4/18) Pressure (Active)   Wound Pressure Stage  3 5/8/2019 10:51 AM   Dressing Status Clean;Dry; Intact 5/8/2019 11:45 AM   Dressing Changed Changed/New 5/8/2019 11:45 AM   Dressing/Treatment Foam;Non adherent;[employer] 4/17/2019  2:09 PM   Wound Cleansed Rinsed/Irrigated with saline 5/8/2019 11:45 AM   Wound Length (cm) 2.5 cm 5/8/2019 10:51 AM   Wound Width (cm) 0.7 cm 5/8/2019 10:51 AM   Wound Depth (cm) 0.1 cm 5/8/2019 10:51 AM   Wound Surface Area (cm^2) 1.75 cm^2 5/8/2019 10:51 AM   Change in Wound Size % (l*w) 72.09 5/8/2019 10:51 AM   Wound Volume (cm^3) 0.18 cm^3 5/8/2019 10:51 AM   Wound Healing % 86 5/8/2019 10:51 AM   Distance Tunneling (cm) 0 cm 4/24/2019  1:01 PM   Undermining Maxium Distance (cm) 0 4/24/2019  1:01 PM   Wound Assessment Epithelialization 5/8/2019 10:51 AM   Drainage Amount Small 5/8/2019 10:51 AM   Drainage Description Serous 5/8/2019 10:51 AM   Odor None 5/8/2019 10:51 AM   Margins Attached edges 5/8/2019 10:51 AM   Chetna-wound Assessment Intact 5/8/2019 10:51 AM   Number of days: 160       Wound 01/23/19 #45, Left Ischium, Pressure, Stage 4, onset 1/16/19, Pressure  (Active)   Wound Image   5/15/2019  1:23 AM   Wound Pressure Stage  4 5/8/2019 11:02 AM   Dressing Status Clean;Dry; Intact 5/15/2019  1:23 AM   Dressing Changed Changed/New 5/15/2019  1:23 AM Wound Cleansed Rinsed/Irrigated with saline 5/8/2019 11:02 AM   Wound Length (cm) 1.3 cm 5/8/2019 11:02 AM   Wound Width (cm) 0.6 cm 5/8/2019 11:02 AM   Wound Depth (cm) 0.5 cm 5/8/2019 11:02 AM   Wound Surface Area (cm^2) 0.78 cm^2 5/8/2019 11:02 AM   Change in Wound Size % (l*w) 41.35 5/8/2019 11:02 AM   Wound Volume (cm^3) 0.39 cm^3 5/8/2019 11:02 AM   Wound Healing % -44 5/8/2019 11:02 AM   Post-Procedure Length (cm) 1.5 cm 4/17/2019 11:45 AM   Post-Procedure Width (cm) 1.8 cm 4/17/2019 11:45 AM   Post-Procedure Depth (cm) 0.5 cm 4/17/2019 11:45 AM   Post-Procedure Surface Area (cm^2) 2.7 cm^2 4/17/2019 11:45 AM   Post-Procedure Volume (cm^3) 1.35 cm^3 4/17/2019 11:45 AM   Distance Tunneling (cm) 0 cm 5/8/2019 11:02 AM   Tunneling Position ___ O'Clock 0 5/1/2019 10:08 AM   Undermining Starts ___ O'Clock 0 5/1/2019 10:08 AM   Undermining Ends___ O'Clock 0 5/1/2019 10:08 AM   Undermining Maxium Distance (cm) 0 5/8/2019 11:02 AM   Wound Assessment Red 5/8/2019 11:02 AM   Drainage Amount Small 5/8/2019 11:02 AM   Drainage Description Serosanguinous 5/8/2019 11:02 AM   Odor None 5/8/2019 11:02 AM   Chetna-wound Assessment Pink 5/8/2019 11:02 AM   Number of days: 112       Wound 02/06/19 #47, Right BKA amp site-Lateral, dehisced wound, full thickness, onset 2/5/19, gradually appeared (Active)   Wound Image   5/15/2019  1:23 AM   Wound Other 5/8/2019 10:51 AM   Dressing Status Clean;Dry; Intact 5/15/2019  1:23 AM   Dressing Changed Changed/New 5/15/2019  1:23 AM   Dressing/Treatment 4x4 5/15/2019  1:23 AM   Wound Cleansed Rinsed/Irrigated with saline 5/8/2019 11:45 AM   Wound Length (cm) 1.9 cm 5/8/2019 10:51 AM   Wound Width (cm) 2.7 cm 5/8/2019 10:51 AM   Wound Depth (cm) 0.1 cm 5/8/2019 10:51 AM   Wound Surface Area (cm^2) 5.13 cm^2 5/8/2019 10:51 AM   Change in Wound Size % (l*w) -266.43 5/8/2019 10:51 AM   Wound Volume (cm^3) 0.51 cm^3 5/8/2019 10:51 AM   Wound Healing % 39 5/8/2019 10:51 AM   Post-Procedure Length (cm) 1.9 cm 5/8/2019 11:40 AM   Post-Procedure Width (cm) 2.7 cm 5/8/2019 11:40 AM   Post-Procedure Depth (cm) 0.1 cm 5/8/2019 11:40 AM   Post-Procedure Surface Area (cm^2) 5.13 cm^2 5/8/2019 11:40 AM   Post-Procedure Volume (cm^3) 0.51 cm^3 5/8/2019 11:40 AM   Distance Tunneling (cm) 0 cm 5/8/2019 11:40 AM   Tunneling Position ___ O'Clock 0 5/1/2019  9:55 AM   Undermining Starts ___ O'Clock 0 5/1/2019  9:55 AM   Undermining Ends___ O'Clock 0 5/1/2019  9:55 AM   Undermining Maxium Distance (cm) 0 5/8/2019 11:40 AM   Wound Assessment Red 5/8/2019 10:51 AM   Drainage Amount Small 5/8/2019 10:51 AM   Drainage Description Serosanguinous 5/8/2019 10:51 AM   Odor None 5/8/2019 10:51 AM   Margins Attached edges 5/8/2019 10:51 AM   Chetna-wound Assessment Pink 5/8/2019 10:51 AM   Number of days: 98       Wound 02/06/19 #48, Right BKA amp. site-Medial, Dehisced Wound, full thickness, onset 2/5/19, gradually appeared (Active)   Wound Image   5/15/2019  1:23 AM   Wound Other 5/8/2019 10:51 AM   Dressing Status Clean;Dry; Intact 5/15/2019  1:23 AM   Dressing Changed Changed/New 5/15/2019  1:23 AM   Dressing/Treatment 4x4 5/15/2019  1:23 AM   Wound Cleansed Rinsed/Irrigated with saline 5/8/2019 11:45 AM   Wound Length (cm) 0.7 cm 5/8/2019 10:51 AM   Wound Width (cm) 1.9 cm 5/8/2019 10:51 AM   Wound Depth (cm) 0.1 cm 5/8/2019 10:51 AM   Wound Surface Area (cm^2) 1.33 cm^2 5/8/2019 10:51 AM   Change in Wound Size % (l*w) 1.48 5/8/2019 10:51 AM   Wound Volume (cm^3) 0.13 cm^3 5/8/2019 10:51 AM   Wound Healing % 68 5/8/2019 10:51 AM   Post-Procedure Length (cm) 0.7 cm 5/8/2019 11:40 AM   Post-Procedure Width (cm) 1.9 cm 5/8/2019 11:40 AM   Post-Procedure Depth (cm) 0.1 cm 5/8/2019 11:40 AM   Post-Procedure Surface Area (cm^2) 1.33 cm^2 5/8/2019 11:40 AM   Post-Procedure Volume (cm^3) 0.13 cm^3 5/8/2019 11:40 AM   Distance Tunneling (cm) 0 cm 5/8/2019 11:40 AM   Tunneling Position ___ O'Clock 0 5/1/2019  9:55 AM   Undermining Starts ___ O'Clock 0 5/1/2019  9:55 AM   Undermining Ends___ O'Clock 0 5/1/2019  9:55 AM   Undermining Maxium Distance (cm) 0 5/8/2019 11:40 AM   Wound Assessment Red 5/8/2019 10:51 AM   Drainage Amount Small 5/8/2019 10:51 AM   Drainage Description Serosanguinous 5/8/2019 10:51 AM   Odor None 5/8/2019 10:51 AM   Margins Attached edges 5/8/2019 10:51 AM   Chetna-wound Assessment Fragile 5/8/2019 10:51 AM   Number of days: 98       Wound 04/10/19 #49, Right Knee Anterior, Trauma, Partial Thickness, (onset 2018), gradually appeared (Active)   Wound Image   5/15/2019  1:23 AM   Wound Traumatic 5/8/2019 10:51 AM   Dressing Status Clean;Dry; Intact 5/15/2019  1:23 AM   Dressing Changed Changed/New 5/15/2019  1:23 AM   Dressing/Treatment 4x4 5/15/2019  1:23 AM   Wound Cleansed Rinsed/Irrigated with saline 5/8/2019 11:45 AM   Wound Length (cm) 1.6 cm 5/8/2019 10:51 AM   Wound Width (cm) 1.2 cm 5/8/2019 10:51 AM   Wound Depth (cm) 0.1 cm 5/8/2019 10:51 AM   Wound Surface Area (cm^2) 1.92 cm^2 5/8/2019 10:51 AM   Change in Wound Size % (l*w) -23.08 5/8/2019 10:51 AM   Wound Volume (cm^3) 0.19 cm^3 5/8/2019 10:51 AM   Wound Healing % -19 5/8/2019 10:51 AM   Distance Tunneling (cm) 0 cm 5/8/2019 10:51 AM   Tunneling Position ___ O'Clock 0 5/1/2019  9:55 AM   Undermining Starts ___ O'Clock 0 5/1/2019  9:55 AM   Undermining Ends___ O'Clock 0 5/1/2019  9:55 AM   Undermining Maxium Distance (cm) 0 5/8/2019 10:51 AM   Wound Assessment Red 5/8/2019 10:51 AM   Drainage Amount Small 5/8/2019 10:51 AM   Drainage Description Sanguinous 5/8/2019 10:51 AM   Odor None 5/8/2019 10:51 AM   Margins Attached edges 5/8/2019 10:51 AM   Chetna-wound Assessment Fragile 5/8/2019 10:51 AM   Number of days: 35       Wound 04/10/19 # 50, Right Medial Knee, Venous, Partial Thickness, (onset 2018) Gradually appeared (Active)   Wound Image   5/15/2019  1:23 AM   Wound Venous 5/8/2019 10:51 AM   Dressing Status Clean;Dry; Intact 5/15/2019  1:23 AM   Dressing Changed Restriction: no  Last Modified Barium Swallow with Video (Video Swallowing Test): not done    Treatments at the Time of Hospital Discharge:   Respiratory Treatments: ***  Oxygen Therapy:  is not on home oxygen therapy. Ventilator:    - No ventilator support    Rehab Therapies: Physical Therapy and Occupational Therapy  Weight Bearing Status/Restrictions: Non-weight bearing on left leg  Other Medical Equipment (for information only, NOT a DME order):  {EQUIPMENT:[id number]}  Other Treatments: ***    Patient's personal belongings (please select all that are sent with patient):  {[facility] DME Belongings:[id number]}    RN SIGNATURE:  Electronically signed by Yadiel Marcano RN on 5/16/19 at 2:08 PM    CASE MANAGEMENT/SOCIAL WORK SECTION    Inpatient Status Date: 05/14/2019    Readmission Risk Assessment Score:  Readmission Risk              Risk of Unplanned Readmission:        40           Discharging to Facility/ Agency   · Name: [facility] home [facility]  · Phone:[phone]  · Fax:[phone]      / signature: Electronically signed by Luci Rincon RN on 5/16/19 at 12:14 PM    PHYSICIAN SECTION    Prognosis: Good    Condition at Discharge: Stable    Rehab Potential (if transferring to Rehab): Good    Recommended Labs or Other Treatments After Discharge:   Recommended Follow-up, Labs or Other Treatments After Discharge:    Resume home care orders and negative pressure wound therapy per Dr Karyn Nolan at the [facility] wound care center. Physician Certification: I certify the above information and transfer of Lou Campbell  is necessary for the continuing treatment of the diagnosis listed and that he requires [address] for less 30 days.      Update Admission H&P: No change in H&P    PHYSICIAN SIGNATURE:  Electronically signed by Vee Trujillo MD on 5/16/19 at 11:48 AM

## 2019-05-15 NOTE — PROGRESS NOTES
Hospitalist Progress Note      PCP: Sharifa Lorenzo MD    Date of Admission: 5/14/2019    Chief Complaint: slurred speech    Subjective: no new c/o. Medications:  Reviewed    Infusion Medications    sodium chloride      dextrose       Scheduled Medications    vancomycin  1,250 mg Intravenous Q12H    metroNIDAZOLE  500 mg Intravenous Q8H    meropenem  1 g Intravenous Q12H    apixaban  5 mg Oral BID    aspirin  81 mg Oral Nightly    docusate sodium  200 mg Oral Daily    ferrous sulfate  325 mg Oral Daily with breakfast    insulin glargine  50 Units Subcutaneous BID    cetirizine  10 mg Oral Daily    metoprolol succinate  100 mg Oral Daily    pantoprazole  40 mg Oral Daily    polyethylene glycol  17 g Oral Daily    rosuvastatin  20 mg Oral Nightly    senna  1 tablet Oral BID    torsemide  40 mg Oral Daily    traZODone  25 mg Oral Nightly    sodium chloride flush  10 mL Intravenous 2 times per day    insulin lispro  0-6 Units Subcutaneous TID WC    insulin lispro  0-3 Units Subcutaneous Nightly     PRN Meds: cyclobenzaprine, promethazine, sodium chloride flush, magnesium hydroxide, labetalol, glucose, dextrose, glucagon (rDNA), dextrose      Intake/Output Summary (Last 24 hours) at 5/15/2019 1024  Last data filed at 5/15/2019 0605  Gross per 24 hour   Intake 60 ml   Output 400 ml   Net -340 ml       Physical Exam Performed:    /68   Pulse 93   Temp 98.5 °F (36.9 °C) (Oral)   Resp 16   SpO2 99%     General appearance: No apparent distress, appears stated age and cooperative. HEENT: Pupils equal, round, and reactive to light. Conjunctivae/corneas clear. Neck: Supple, with full range of motion. No jugular venous distention. Trachea midline. Respiratory:  Normal respiratory effort. Clear to auscultation, bilaterally without Rales/Wheezes/Rhonchi. Cardiovascular: Regular rate and rhythm with normal S1/S2 without murmurs, rubs or gallops.   Abdomen: Soft, non-tender, non-distended with normal bowel sounds. Musculoskeletal: No clubbing, cyanosis or edema bilaterally. Full range of motion without deformity. Skin: Skin color, texture, turgor normal.  No rashes or lesions. Neurologic:  Neurovascularly intact without any focal sensory/motor deficits.  Cranial nerves: II-XII intact, grossly non-focal.  Psychiatric: Alert and oriented, thought content appropriate, normal insight  Capillary Refill: Brisk,< 3 seconds   Peripheral Pulses: +2 palpable, equal bilaterally       Labs:   Recent Labs     05/14/19  1214 05/15/19  0501   WBC 9.0 7.3   HGB 9.3* 8.0*   HCT 28.8* 25.1*    273     Recent Labs     05/14/19  1214 05/15/19  0501   * 132*   K 4.9 4.7   CL 95* 96*   CO2 25 25   BUN 45* 38*   CREATININE 1.2 1.2   CALCIUM 9.5 8.6     Recent Labs     05/14/19  1214   AST 19   ALT 7*   BILITOT 0.5   ALKPHOS 181*     Recent Labs     05/14/19  1214   INR 2.17*     Recent Labs     05/14/19  1214 05/15/19  0100 05/15/19  0501   TROPONINI 0.12* 0.10* 0.10*       Urinalysis:      Lab Results   Component Value Date    NITRU Negative 05/14/2019    WBCUA 0-2 05/14/2019    BACTERIA 1+ 05/14/2019    RBCUA 3-5 05/14/2019    BLOODU TRACE-INTACT 05/14/2019    SPECGRAV 1.010 05/14/2019    GLUCOSEU Negative 05/14/2019    GLUCOSEU >=1000 mg/dL 08/31/2010       Radiology:  MRI BRAIN WO CONTRAST    (Results Pending)           Assessment/Plan:    Active Hospital Problems    Diagnosis Date Noted    TIA (transient ischemic attack) [G45.9] 05/14/2019    Traumatic ulcer of lower extremity, right, limited to breakdown of skin [facility]) [L97.911] 05/01/2019    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, initial encounter Nancy Rodriguez 02/07/2019    LIBORIO on CPAP [G47.33, Z99.89]     Type 2 diabetes mellitus with skin complication, with long-term current use of insulin ([facility] [address] [C63.819, Z79.4] 03/10/2014    Paraplegia, complete ([facility] [address] [G82.21] 03/10/2014       TIA - CT scan w/ hypoattentuation of L parietal lobe w/ MRI ordered and pending. Neurology consulted and appreciated in advance. Cellulitis - On Vanco/Merrem/Flagyl started 14 May. Wound vac in place - continue. Follows w/ [facility] wound clinic. DM2 - controlled on home Lantus - continued. Follow FSBS/SSI med regimen. Morbid Obesity -  Clinically obese but  There is no height or weight on file to calculate BMI. Complicating assessment and treatment. Placing patient at risk for multiple co-morbidities as well as early death and contributing to the patient's presentation. Counseled on weight loss. LIBORIO - controlled on home CPAP - continue, w/ outpt f/u as previously arranged. Anemia - of unclear etiology w/out evidence of active bleeding/hemolysis. Asymptomatic w/out indication for transfusion. Will continue to follow serial labs. Reviewed and documented as above. DVT Prophylaxis: Eliquis  Diet: DIET CARDIAC; Carb Control: 5 carb choices (75 gms)/meal; Low Sodium (2 GM)  Code Status: Full Code      PT/OT Eval Status: not yet ordered. Dispo - placed in OBS. Likely to home Wed/Thurs 15/16 May pending Neurology recs.       Hever Clancy MD

## 2019-05-15 NOTE — ED NOTES
Pt with bp trend of sbp 90s and map around 60-65, on transfer vitals bp as noted for 85/47. Pt remains awake, alert and conversing with family at bedside, notified dr Andrew Hassan and new orders received for bolus ns 1000 and ok to proceed with transfer. Full nihss and report updated with Beatriz Gimenez at transfer destination.      Janette Luna RN  05/14/19 1965

## 2019-05-15 NOTE — PLAN OF CARE
Problem: Falls - Risk of:  Goal: Will remain free from falls  Description  Will remain free from falls  Outcome: Ongoing     Problem: Risk for Impaired Skin Integrity  Goal: Tissue integrity - skin and mucous membranes  Description  Structural intactness and normal physiological function of skin and  mucous membranes.   Outcome: Ongoing

## 2019-05-16 VITALS
RESPIRATION RATE: 16 BRPM | OXYGEN SATURATION: 100 % | HEART RATE: 69 BPM | HEIGHT: 74 IN | BODY MASS INDEX: 29.99 KG/M2 | WEIGHT: 233.69 LBS | TEMPERATURE: 97.5 F | DIASTOLIC BLOOD PRESSURE: 63 MMHG | SYSTOLIC BLOOD PRESSURE: 108 MMHG

## 2019-05-16 LAB
GLUCOSE BLD-MCNC: 254 MG/DL (ref 70–99)
GLUCOSE BLD-MCNC: 305 MG/DL (ref 70–99)
LV EF: 23 %
LVEF MODALITY: NORMAL
PERFORMED ON: ABNORMAL
PERFORMED ON: ABNORMAL

## 2019-05-16 PROCEDURE — 96366 THER/PROPH/DIAG IV INF ADDON: CPT

## 2019-05-16 PROCEDURE — 2500000003 HC RX 250 WO HCPCS: Performed by: NURSE PRACTITIONER

## 2019-05-16 PROCEDURE — 2700000000 HC OXYGEN THERAPY PER DAY

## 2019-05-16 PROCEDURE — 99225 PR SBSQ OBSERVATION CARE/DAY 25 MINUTES: CPT | Performed by: PSYCHIATRY & NEUROLOGY

## 2019-05-16 PROCEDURE — 94761 N-INVAS EAR/PLS OXIMETRY MLT: CPT

## 2019-05-16 PROCEDURE — G0378 HOSPITAL OBSERVATION PER HR: HCPCS

## 2019-05-16 PROCEDURE — 6360000002 HC RX W HCPCS: Performed by: NURSE PRACTITIONER

## 2019-05-16 PROCEDURE — 6370000000 HC RX 637 (ALT 250 FOR IP): Performed by: INTERNAL MEDICINE

## 2019-05-16 PROCEDURE — 2580000003 HC RX 258: Performed by: NURSE PRACTITIONER

## 2019-05-16 PROCEDURE — 6370000000 HC RX 637 (ALT 250 FOR IP): Performed by: NURSE PRACTITIONER

## 2019-05-16 PROCEDURE — C8929 TTE W OR WO FOL WCON,DOPPLER: HCPCS

## 2019-05-16 RX ORDER — METOPROLOL SUCCINATE 50 MG/1
100 TABLET, EXTENDED RELEASE ORAL NIGHTLY
Status: DISCONTINUED | OUTPATIENT
Start: 2019-05-16 | End: 2019-05-16 | Stop reason: HOSPADM

## 2019-05-16 RX ORDER — ACETAMINOPHEN 325 MG/1
650 TABLET ORAL EVERY 4 HOURS PRN
Status: DISCONTINUED | OUTPATIENT
Start: 2019-05-16 | End: 2019-05-16 | Stop reason: HOSPADM

## 2019-05-16 RX ORDER — TORSEMIDE 20 MG/1
40 TABLET ORAL NIGHTLY
Status: DISCONTINUED | OUTPATIENT
Start: 2019-05-16 | End: 2019-05-16 | Stop reason: HOSPADM

## 2019-05-16 RX ORDER — FERROUS SULFATE 325(65) MG
325 TABLET ORAL 2 TIMES DAILY WITH MEALS
Status: DISCONTINUED | OUTPATIENT
Start: 2019-05-16 | End: 2019-05-16 | Stop reason: HOSPADM

## 2019-05-16 RX ADMIN — MAGNESIUM GLUCONATE 500 MG ORAL TABLET 1600 MG: 500 TABLET ORAL at 09:19

## 2019-05-16 RX ADMIN — MAGNESIUM GLUCONATE 500 MG ORAL TABLET 2000 MG: 500 TABLET ORAL at 00:46

## 2019-05-16 RX ADMIN — INSULIN LISPRO 4 UNITS: 100 INJECTION, SOLUTION INTRAVENOUS; SUBCUTANEOUS at 09:18

## 2019-05-16 RX ADMIN — METRONIDAZOLE 500 MG: 500 INJECTION, SOLUTION INTRAVENOUS at 14:10

## 2019-05-16 RX ADMIN — PANTOPRAZOLE SODIUM 40 MG: 40 TABLET, DELAYED RELEASE ORAL at 09:18

## 2019-05-16 RX ADMIN — MEROPENEM 1 G: 1 INJECTION, POWDER, FOR SOLUTION INTRAVENOUS at 10:56

## 2019-05-16 RX ADMIN — APIXABAN 5 MG: 5 TABLET, FILM COATED ORAL at 09:19

## 2019-05-16 RX ADMIN — SENNOSIDES 8.6 MG: 8.6 TABLET, FILM COATED ORAL at 09:19

## 2019-05-16 RX ADMIN — METRONIDAZOLE 500 MG: 500 INJECTION, SOLUTION INTRAVENOUS at 06:08

## 2019-05-16 RX ADMIN — FERROUS SULFATE TAB 325 MG (65 MG ELEMENTAL FE) 325 MG: 325 (65 FE) TAB at 09:19

## 2019-05-16 RX ADMIN — ACETAMINOPHEN 650 MG: 325 TABLET ORAL at 09:19

## 2019-05-16 RX ADMIN — INSULIN GLARGINE 50 UNITS: 100 INJECTION, SOLUTION SUBCUTANEOUS at 09:18

## 2019-05-16 RX ADMIN — CETIRIZINE HYDROCHLORIDE 10 MG: 10 TABLET, FILM COATED ORAL at 09:18

## 2019-05-16 RX ADMIN — VANCOMYCIN HYDROCHLORIDE 1250 MG: 10 INJECTION, POWDER, LYOPHILIZED, FOR SOLUTION INTRAVENOUS at 07:30

## 2019-05-16 RX ADMIN — INSULIN LISPRO 3 UNITS: 100 INJECTION, SOLUTION INTRAVENOUS; SUBCUTANEOUS at 10:59

## 2019-05-16 RX ADMIN — DOCUSATE SODIUM 200 MG: 100 CAPSULE, LIQUID FILLED ORAL at 09:18

## 2019-05-16 RX ADMIN — METOPROLOL SUCCINATE 100 MG: 50 TABLET, EXTENDED RELEASE ORAL at 00:46

## 2019-05-16 ASSESSMENT — PAIN SCALES - GENERAL: PAINLEVEL_OUTOF10: 3

## 2019-05-16 ASSESSMENT — PAIN DESCRIPTION - ORIENTATION: ORIENTATION: LEFT

## 2019-05-16 ASSESSMENT — PAIN DESCRIPTION - PAIN TYPE: TYPE: CHRONIC PAIN

## 2019-05-16 ASSESSMENT — PAIN DESCRIPTION - LOCATION: LOCATION: SHOULDER;NECK

## 2019-05-16 NOTE — CONSULTS
Nutrition Assessment    Type and Reason for Visit: Initial, Positive Nutrition Screen, Consult(Consult for egronimo score. + screen for wounds)    Nutrition Recommendations:   · Continue cardiac, low sodium, carb control diet  · Add Proteinex daily  · Monitor po intakes, nutrition adequacy, weights, pertinent labs, BMs    Nutrition Assessment: Pt adequately nourished on admission AEB Hx of good appetite. Pt reports that he has intentionally lost ~40 lb over the past year by eating healthier and monitoring portion sizes. Pt at risk for further decline d/t increased nutrition needs from presence of wounds. Encouraged protein intakes. Pt favorable to trying Proteinex (protein modular) to help meet needs, as pt does not like Ensure or Oc. Will order ONS and continue current diet. Malnutrition Assessment:  · Malnutrition Status: At risk for malnutrition  · Context: Acute illness or injury  · Findings of the 6 clinical characteristics of malnutrition (Minimum of 2 out of 6 clinical characteristics is required to make the diagnosis of moderate or severe Protein Calorie Malnutrition based on AND/ASPEN Guidelines):  1. Energy Intake-Greater than 75% of estimated energy requirement,      2. Weight Loss-No significant weight loss(Intentional weight loss),    3. Fat Loss-Mild subcutaneous fat loss, Orbital  4. Muscle Loss-No significant muscle mass loss,    5. Fluid Accumulation-No significant fluid accumulation,    6.   Strength-Not measured    Nutrition Risk Level: High(Stage 4 wound)    Nutrient Needs:  · Estimated Daily Total Kcal: 0624-4047(11-18 kcal/kg)  · Estimated Daily Protein (g): (1.2-1.5 g/kg)  · Estimated Daily Total Fluid (ml/day): 1 ml/kcal    Nutrition Diagnosis:   · Problem: Increased nutrient needs  · Etiology: related to Increased demand for energy/nutrients     Signs and symptoms:  as evidenced by Presence of wounds    Objective Information:  · Nutrition-Focused Physical Findings: Pt

## 2019-05-16 NOTE — CARE COORDINATION
CASE MANAGEMENT DISCHARGE SUMMARY      Discharge to: home with [facility] through [facility]     Transportation: ambulance   Medical Transport explained to pt/family. Pt/family voice no agency preference. Agency used: [facility] up time:1500   Ambulance form completed: Yes    Notified: Pt and RN aware   Facility/Agency: [facility] Elyssa Buckley in intake to pull orders and KISHAN from epic.
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
## 2019-05-16 NOTE — PROGRESS NOTES
Patient's langley removed prior to d/c. Patient's chest port unhooked capped. Patient's son took all of his belongings home with him. Patient was provided written and oral discharge instructions. Patient stated that he understood and that he would follow up with his PCP.   Patient was then loaded on to stretcher and discharged into the care of EMT's

## 2019-05-16 NOTE — PROGRESS NOTES
Assessment complete. Mag level this am 1.4. Pt states he is supposed to take 4 100mg tablets of magnesium oxide in the morning and 5 100mg tabs at night.  Paged CNP for order for this as well as iron sucrose for 9pm.

## 2019-05-16 NOTE — PLAN OF CARE
Problem: Nutrition  Goal: Optimal nutrition therapy  Outcome: Ongoing  Note:   Nutrition Problem: Increased nutrient needs  Intervention: Food and/or Nutrient Delivery: Continue current diet, Continue current ONS  Nutritional Goals: Pt will have po intakes 50% or greater this admission

## 2019-05-16 NOTE — PROGRESS NOTES
Hospitalist Progress Note      PCP: Shun Corea MD    Date of Admission: 5/14/2019    Chief Complaint: slurred speech    Subjective: no new c/o. Medications:  Reviewed    Infusion Medications    dextrose       Scheduled Medications    ferrous sulfate  325 mg Oral BID WC    metoprolol succinate  100 mg Oral Nightly    magnesium oxide  2,000 mg Oral Nightly    magnesium oxide  1,600 mg Oral Daily    vancomycin  1,250 mg Intravenous Q12H    metroNIDAZOLE  500 mg Intravenous Q8H    meropenem  1 g Intravenous Q12H    apixaban  5 mg Oral BID    aspirin  81 mg Oral Nightly    docusate sodium  200 mg Oral Daily    insulin glargine  50 Units Subcutaneous BID    cetirizine  10 mg Oral Daily    pantoprazole  40 mg Oral Daily    polyethylene glycol  17 g Oral Daily    rosuvastatin  20 mg Oral Nightly    senna  1 tablet Oral BID    torsemide  40 mg Oral Daily    traZODone  25 mg Oral Nightly    sodium chloride flush  10 mL Intravenous 2 times per day    insulin lispro  0-6 Units Subcutaneous TID WC    insulin lispro  0-3 Units Subcutaneous Nightly     PRN Meds: cyclobenzaprine, promethazine, sodium chloride flush, magnesium hydroxide, labetalol, glucose, dextrose, glucagon (rDNA), dextrose      Intake/Output Summary (Last 24 hours) at 5/16/2019 4836  Last data filed at 5/16/2019 0616  Gross per 24 hour   Intake 480 ml   Output 4100 ml   Net -3620 ml       Physical Exam Performed:    /68   Pulse 96   Temp 98.4 °F (36.9 °C) (Oral)   Resp 16   Ht 6' 2\" (1.88 m)   Wt 233 lb 11 oz (106 kg)   SpO2 96%   BMI 30.00 kg/m²     General appearance: No apparent distress, appears stated age and cooperative. HEENT: Pupils equal, round, and reactive to light. Conjunctivae/corneas clear. Neck: Supple, with full range of motion. No jugular venous distention. Trachea midline. Respiratory:  Normal respiratory effort.  Clear to auscultation, bilaterally without Rales/Wheezes/Rhonchi. Cardiovascular: Regular rate and rhythm with normal S1/S2 without murmurs, rubs or gallops. Abdomen: Soft, non-tender, non-distended with normal bowel sounds. Musculoskeletal: No clubbing, cyanosis or edema bilaterally. Full range of motion without deformity. Skin: Skin color, texture, turgor normal.  No rashes or lesions. Neurologic:  Neurovascularly intact without any focal sensory/motor deficits.  Cranial nerves: II-XII intact, grossly non-focal.  Psychiatric: Alert and oriented, thought content appropriate, normal insight  Capillary Refill: Brisk,< 3 seconds   Peripheral Pulses: +2 palpable, equal bilaterally       Labs:   Recent Labs     05/14/19  1214 05/15/19  0501   WBC 9.0 7.3   HGB 9.3* 8.0*   HCT 28.8* 25.1*    273     Recent Labs     05/14/19  1214 05/15/19  0501   * 132*   K 4.9 4.7   CL 95* 96*   CO2 25 25   BUN 45* 38*   CREATININE 1.2 1.2   CALCIUM 9.5 8.6     Recent Labs     05/14/19  1214   AST 19   ALT 7*   BILITOT 0.5   ALKPHOS 181*     Recent Labs     05/14/19  1214   INR 2.17*     Recent Labs     05/14/19  1214 05/15/19  0100 05/15/19  0501   TROPONINI 0.12* 0.10* 0.10*       Urinalysis:      Lab Results   Component Value Date    NITRU Negative 05/14/2019    WBCUA 0-2 05/14/2019    BACTERIA 1+ 05/14/2019    RBCUA 3-5 05/14/2019    BLOODU TRACE-INTACT 05/14/2019    SPECGRAV 1.010 05/14/2019    GLUCOSEU Negative 05/14/2019    GLUCOSEU >=1000 mg/dL 08/31/2010       Radiology:  No orders to display           Assessment/Plan:    Active Hospital Problems    Diagnosis Date Noted    Dyslipidemia [E78.5]     Chronic anticoagulation [Z79.01]     TIA (transient ischemic attack) [G45.9] 05/14/2019    Traumatic ulcer of lower extremity, right, limited to breakdown of skin ([facility] [L97.911] 05/01/2019    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, initial encounter Nevaeh Gutierrez 02/07/2019    LIBORIO on CPAP [G47.33, Z99.89]     Type 2 diabetes mellitus with skin complication, with long-term current use of insulin ([facility] [M91.739, Z79.4] 03/10/2014    Paraplegia, complete ([facility] [G82.21] 03/10/2014    HTN (hypertension), benign [I10] 02/22/2010       TIA - CT scan w/ hypoattentuation of L parietal lobe w/ MRI ordered but patient 'unable to fit' in scanner. Neurology consulted and appreciated, awaiting echo. Cellulitis - On Vanco/Merrem/Flagyl started 14 May. Wound vac in place - continue. Follows w/ [facility] wound clinic. DM2 - controlled on home Lantus - continued. Follow FSBS/SSI med regimen. Obesity -  Clinically obese but  Body mass index is 30 kg/m². Complicating assessment and treatment. Placing patient at risk for multiple co-morbidities as well as early death and contributing to the patient's presentation. Counseled on weight loss. LIBORIO - controlled on home CPAP - continue, w/ outpt f/u as previously arranged. Anemia - of unclear etiology w/out evidence of active bleeding/hemolysis. Asymptomatic w/out indication for transfusion. Will continue to follow serial labs. Reviewed and documented as above. DVT Prophylaxis: Eliquis  Diet: DIET CARDIAC; Carb Control: 5 carb choices (75 gms)/meal; Low Sodium (2 GM)  Dietary Nutrition Supplements: Protein Modular  Code Status: Full Code      PT/OT Eval Status: not yet ordered. Dispo - placed in OBS. Likely to home Thurs 16 May pending Echo/Neurology recs.       Jovan Hanson MD

## 2019-05-17 ENCOUNTER — HOSPITAL ENCOUNTER (OUTPATIENT)
Dept: WOUND CARE | Age: 54
Discharge: HOME | End: 2019-05-17
Payer: MEDICARE

## 2019-05-17 VITALS
BODY MASS INDEX: 29.52 KG/M2 | RESPIRATION RATE: 18 BRPM | DIASTOLIC BLOOD PRESSURE: 63 MMHG | HEART RATE: 67 BPM | WEIGHT: 230 LBS | HEIGHT: 74 IN | SYSTOLIC BLOOD PRESSURE: 100 MMHG | TEMPERATURE: 97.2 F

## 2019-05-17 PROCEDURE — 97598 DBRDMT OPN WND ADDL 20CM/<: CPT | Performed by: INTERNAL MEDICINE

## 2019-05-17 PROCEDURE — 97597 DBRDMT OPN WND 1ST 20 CM/<: CPT

## 2019-05-17 PROCEDURE — 97598 DBRDMT OPN WND ADDL 20CM/<: CPT

## 2019-05-17 PROCEDURE — 17250 CHEM CAUT OF GRANLTJ TISSUE: CPT | Performed by: INTERNAL MEDICINE

## 2019-05-17 PROCEDURE — 17250 CHEM CAUT OF GRANLTJ TISSUE: CPT

## 2019-05-17 PROCEDURE — 99214 OFFICE O/P EST MOD 30 MIN: CPT | Performed by: INTERNAL MEDICINE

## 2019-05-17 PROCEDURE — 97597 DBRDMT OPN WND 1ST 20 CM/<: CPT | Performed by: INTERNAL MEDICINE

## 2019-05-17 RX ORDER — LIDOCAINE 40 MG/G
CREAM TOPICAL ONCE
Status: DISCONTINUED | OUTPATIENT
Start: 2019-05-17 | End: 2019-05-18 | Stop reason: HOSPADM

## 2019-05-17 ASSESSMENT — PAIN SCALES - GENERAL: PAINLEVEL_OUTOF10: 0

## 2019-05-17 NOTE — DISCHARGE SUMMARY
Hospital Medicine Discharge Summary    Patient ID: Dea Ganser      Patient's PCP: Olivia Smith MD    Admit Date: 5/14/2019     Discharge Date: 5/16/2019      Admitting Physician: Annette Almeida MD     Discharge Physician: Darby Richardson MD     Discharge Diagnoses: Active Hospital Problems    Diagnosis Date Noted    Dyslipidemia [E78.5]     Chronic anticoagulation [Z79.01]     TIA (transient ischemic attack) [G45.9] 05/14/2019    Traumatic ulcer of lower extremity, right, limited to breakdown of skin [facility]) [L97.911] 05/01/2019    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, initial encounter Kai Goldberg 02/07/2019    LIBORIO on CPAP [G47.33, Z99.89]     Type 2 diabetes mellitus with skin complication, with long-term current use of insulin ([facility] [W73.040, Z79.4] 03/10/2014    Paraplegia, complete ([facility] [G82.21] 03/10/2014    HTN (hypertension), benign [I10] 02/22/2010       The patient was seen and examined on day of discharge and this discharge summary is in conjunction with any daily progress note from day of discharge. Hospital Course:       TIA - CT scan w/ hypoattentuation of L parietal lobe w/ MRI ordered but patient 'unable to fit' in scanner. Neurology consulted and appreciated, Echo unremarkable.       Cellulitis - On Vanco/Merrem/Flagyl started 14 May. Wound vac in place - continue. Follows w/ [facility] wound clinic.      DM2 - controlled on home Lantus - continued. Follow FSBS/SSI med regimen.      Obesity -  Clinically obese but  Body mass index is 30 kg/m². Complicating assessment and treatment. Placing patient at risk for multiple co-morbidities as well as early death and contributing to the patient's presentation. Counseled on weight loss.      LIBORIO - controlled on home CPAP - continue, w/ outpt f/u as previously arranged.      Anemia - of unclear etiology w/out evidence of active bleeding/hemolysis. Asymptomatic w/out indication for transfusion.  Followed serial labs.        Labs: For convenience and continuity at follow-up the following most recent labs are provided:      CBC:    Lab Results   Component Value Date    WBC 7.3 05/15/2019    HGB 8.0 05/15/2019    HCT 25.1 05/15/2019     05/15/2019       Renal:    Lab Results   Component Value Date     05/15/2019    K 4.7 05/15/2019    CL 96 05/15/2019    CO2 25 05/15/2019    BUN 38 05/15/2019    CREATININE 1.2 05/15/2019    CALCIUM 8.6 05/15/2019    PHOS 3.8 07/31/2018         Significant Diagnostic Studies    Radiology:   No orders to display          Consults:     IP CONSULT TO NEUROLOGY  PHARMACY TO DOSE VANCOMYCIN  IP CONSULT TO DIETITIAN  IP CONSULT TO HOME CARE NEEDS    Disposition: home     Condition at Discharge: Stable    Discharge Instructions/Follow-up:  W/ PCP 2 weeks    Code Status:  Full Code    Activity: activity as tolerated    Diet: regular diet      Discharge Medications:     Discharge Medication List as of 5/16/2019  2:53 PM           Details   triamcinolone (KENALOG) 0.1 % cream Apply to left arm rash 2 times daily. , Disp-45 g, R-0, Normal      metFORMIN (GLUCOPHAGE) 1000 MG tablet TAKE ONE TABLET BY MOUTH TWICE A DAY FOR BLOOD SUGAR, Disp-180 tablet, R-0Normal      blood glucose test strips (ONETOUCH VERIO) strip Disp-100 each, R-3, NormalUse to test daily.   DX:E11.9      magnesium oxide (MAG-OX) 400 (241.3 Mg) MG TABS tablet TAKE FOUR TABLETS BY MOUTH EVERY MORNING AND TAKE FIVE TABLETS BY MOUTH EVERY EVENING, Disp-270 tablet, R-0Normal      vancomycin (VANCOCIN) infusion Infuse 1,250 mg intravenously every 12 hours Compound per protocol., Disp-066217 mg, R-0Phone In      ertapenem [facility]) infusion Infuse 1,000 mg intravenously every 24 hours Compound per protocol., Disp-30606 mg, R-0Phone In      pantoprazole (PROTONIX) 40 MG tablet TAKE ONE TABLET BY MOUTH DAILY, Disp-90 tablet, R-0Normal      metoprolol succinate (TOPROL XL) 100 MG extended release tablet TAKE ONE TABLET BY MOUTH DAILY, Disp-90 tablet, R-0Normal      docusate sodium (COLACE) 100 MG capsule Take 2 capsules by mouth daily, Disp-180 capsule, R-0Normal      loratadine (CLARITIN) 10 MG tablet TAKE ONE TABLET BY MOUTH DAILY, Disp-90 tablet, R-0Normal      senna (SENNA LAX) 8.6 MG tablet TAKE TWO TABLETS BY MOUTH DAILY, Disp-180 tablet, R-0Normal      ferrous sulfate 325 (65 Fe) MG tablet TAKE ONE TABLET BY MOUTH DAILY THEN TAKE TWO TABLETS BY MOUTH AT BEDTIME, Disp-270 tablet, R-0Normal      torsemide (DEMADEX) 20 MG tablet Take 2 tablets by mouth daily, Disp-90 tablet, R-1Normal      TRAZODONE HCL PO Take 25 mg by mouth nightlyHistorical Med      polyethylene glycol (MIRALAX) powder Take 17 g by mouth as neededHistorical Med      metroNIDAZOLE (FLAGYL) 500 MG tablet Crush one tablet and sprinkle fine powder into back / ischial / foot wounds daily PRN malodor, with dressing changes. , Disp-60 tablet, R-2Normal      LANTUS 100 UNIT/ML injection vial INJECT 50 UNITS UNDER THE SKIN TWO TIMES A DAY, Disp-30 mL, R-5Normal      Apixaban (ELIQUIS PO) Take 5 mg by mouth 2 times dailyHistorical Med      metoclopramide (REGLAN) 10 MG tablet Take one tablet TID PRN headache and nausea; may increase to two tabs TID PRN if symptoms persist., Disp-20 tablet, R-0Normal      promethazine (PHENERGAN) 25 MG tablet Take 1 tablet by mouth every 6 hours as needed for Nausea, Disp-60 tablet, R-1Normal      Insulin Lispro (HUMALOG SC) Inject into the skin 4 times daily (before meals and nightly) Sliding scaleHistorical Med      rosuvastatin (CRESTOR) 20 MG tablet Take 20 mg by mouth nightly Historical Med      nitroGLYCERIN (NITROSTAT) 0.4 MG SL tablet up to max of 3 total doses.  If no relief after 1 dose, call 911., Disp-25 tablet, R-3Normal      aspirin 81 MG tablet Take 81 mg by mouth nightly Historical Med      nystatin (MYCOSTATIN) 936945 UNIT/GM powder Apply 2-3 times daily as needed, if pelvic rash is very moist., Disp-30 g, R-1, Normal

## 2019-05-19 LAB — BLOOD CULTURE, ROUTINE: NORMAL

## 2019-05-20 RX ORDER — TORSEMIDE 20 MG/1
TABLET ORAL
Qty: 90 TABLET | Refills: 0 | Status: SHIPPED | OUTPATIENT
Start: 2019-05-20 | End: 2019-07-07 | Stop reason: SDUPTHER

## 2019-05-20 NOTE — PLAN OF CARE
No changes in wound care this week. Patient reminded on importance of offloading and frequent position changes for optimal skin integrity and wound healing potential.  Patient will follow up as ordered on AVS in 1 week in wound clinic. Discharge instructions reviewed with patient, all questions answered, copy given to patient. Dressings were applied to all wounds per M.D. Instructions at this visit.

## 2019-05-21 ENCOUNTER — HOSPITAL ENCOUNTER (OUTPATIENT)
Age: 54
Setting detail: SPECIMEN
Discharge: HOME | End: 2019-05-21
Payer: MEDICARE

## 2019-05-21 LAB
A/G RATIO: 0.8 (ref 1.1–2.2)
ALBUMIN SERPL-MCNC: 3.4 G/DL (ref 3.4–5)
ALP BLD-CCNC: 194 U/L (ref 40–129)
ALT SERPL-CCNC: 6 U/L (ref 10–40)
ANION GAP SERPL CALCULATED.3IONS-SCNC: 14 MMOL/L (ref 3–16)
AST SERPL-CCNC: 13 U/L (ref 15–37)
BASOPHILS ABSOLUTE: 0.1 K/UL (ref 0–0.2)
BASOPHILS RELATIVE PERCENT: 1.4 %
BILIRUB SERPL-MCNC: 0.3 MG/DL (ref 0–1)
BUN BLDV-MCNC: 41 MG/DL (ref 7–20)
C-REACTIVE PROTEIN: 37.5 MG/L (ref 0–5.1)
CALCIUM SERPL-MCNC: 8.7 MG/DL (ref 8.3–10.6)
CHLORIDE BLD-SCNC: 93 MMOL/L (ref 99–110)
CO2: 25 MMOL/L (ref 21–32)
CREAT SERPL-MCNC: 1.1 MG/DL (ref 0.9–1.3)
EOSINOPHILS ABSOLUTE: 0.4 K/UL (ref 0–0.6)
EOSINOPHILS RELATIVE PERCENT: 5.6 %
GFR AFRICAN AMERICAN: >60
GFR NON-AFRICAN AMERICAN: >60
GLOBULIN: 4.3 G/DL
GLUCOSE BLD-MCNC: 194 MG/DL (ref 70–99)
HCT VFR BLD CALC: 27.2 % (ref 40.5–52.5)
HEMOGLOBIN: 8.9 G/DL (ref 13.5–17.5)
LYMPHOCYTES ABSOLUTE: 1.3 K/UL (ref 1–5.1)
LYMPHOCYTES RELATIVE PERCENT: 18.3 %
MCH RBC QN AUTO: 25.3 PG (ref 26–34)
MCHC RBC AUTO-ENTMCNC: 32.6 G/DL (ref 31–36)
MCV RBC AUTO: 77.4 FL (ref 80–100)
MONOCYTES ABSOLUTE: 0.4 K/UL (ref 0–1.3)
MONOCYTES RELATIVE PERCENT: 6 %
NEUTROPHILS ABSOLUTE: 5.1 K/UL (ref 1.7–7.7)
NEUTROPHILS RELATIVE PERCENT: 68.7 %
PDW BLD-RTO: 20.6 % (ref 12.4–15.4)
PLATELET # BLD: 311 K/UL (ref 135–450)
PMV BLD AUTO: 9.2 FL (ref 5–10.5)
POTASSIUM SERPL-SCNC: 4.6 MMOL/L (ref 3.5–5.1)
RBC # BLD: 3.52 M/UL (ref 4.2–5.9)
SEDIMENTATION RATE, ERYTHROCYTE: 105 MM/HR (ref 0–20)
SODIUM BLD-SCNC: 132 MMOL/L (ref 136–145)
TOTAL PROTEIN: 7.7 G/DL (ref 6.4–8.2)
VANCOMYCIN TROUGH: 14.2 UG/ML (ref 10–20)
WBC # BLD: 7.3 K/UL (ref 4–11)

## 2019-05-21 PROCEDURE — 85652 RBC SED RATE AUTOMATED: CPT

## 2019-05-21 PROCEDURE — 85025 COMPLETE CBC W/AUTO DIFF WBC: CPT

## 2019-05-21 PROCEDURE — 80053 COMPREHEN METABOLIC PANEL: CPT

## 2019-05-21 PROCEDURE — 86140 C-REACTIVE PROTEIN: CPT

## 2019-05-21 PROCEDURE — 36415 COLL VENOUS BLD VENIPUNCTURE: CPT

## 2019-05-21 PROCEDURE — 80202 ASSAY OF VANCOMYCIN: CPT

## 2019-05-22 ENCOUNTER — HOSPITAL ENCOUNTER (OUTPATIENT)
Dept: WOUND CARE | Age: 54
Discharge: HOME | End: 2019-05-22
Payer: MEDICARE

## 2019-05-22 VITALS
BODY MASS INDEX: 29.66 KG/M2 | RESPIRATION RATE: 16 BRPM | SYSTOLIC BLOOD PRESSURE: 105 MMHG | WEIGHT: 231 LBS | DIASTOLIC BLOOD PRESSURE: 68 MMHG | HEART RATE: 64 BPM | TEMPERATURE: 97 F

## 2019-05-22 PROCEDURE — 17250 CHEM CAUT OF GRANLTJ TISSUE: CPT

## 2019-05-22 PROCEDURE — 97597 DBRDMT OPN WND 1ST 20 CM/<: CPT | Performed by: INTERNAL MEDICINE

## 2019-05-22 PROCEDURE — 97598 DBRDMT OPN WND ADDL 20CM/<: CPT | Performed by: INTERNAL MEDICINE

## 2019-05-22 PROCEDURE — 97598 DBRDMT OPN WND ADDL 20CM/<: CPT

## 2019-05-22 PROCEDURE — 97597 DBRDMT OPN WND 1ST 20 CM/<: CPT

## 2019-05-22 PROCEDURE — 97605 NEG PRS WND THER DME<=50SQCM: CPT | Performed by: INTERNAL MEDICINE

## 2019-05-22 PROCEDURE — 97605 NEG PRS WND THER DME<=50SQCM: CPT

## 2019-05-22 PROCEDURE — 17250 CHEM CAUT OF GRANLTJ TISSUE: CPT | Performed by: INTERNAL MEDICINE

## 2019-05-22 PROCEDURE — 99214 OFFICE O/P EST MOD 30 MIN: CPT | Performed by: INTERNAL MEDICINE

## 2019-05-22 ASSESSMENT — PAIN SCALES - GENERAL: PAINLEVEL_OUTOF10: 0

## 2019-05-22 ASSESSMENT — PAIN DESCRIPTION - DESCRIPTORS: DESCRIPTORS: BURNING

## 2019-05-22 ASSESSMENT — PAIN DESCRIPTION - ORIENTATION: ORIENTATION: LEFT

## 2019-05-22 ASSESSMENT — PAIN DESCRIPTION - PROGRESSION: CLINICAL_PROGRESSION: NOT CHANGED

## 2019-05-22 ASSESSMENT — PAIN DESCRIPTION - LOCATION: LOCATION: SHOULDER;NECK

## 2019-05-22 ASSESSMENT — PAIN DESCRIPTION - ONSET: ONSET: ON-GOING

## 2019-05-22 ASSESSMENT — PAIN DESCRIPTION - PAIN TYPE: TYPE: CHRONIC PAIN

## 2019-05-27 ENCOUNTER — HOSPITAL ENCOUNTER (OUTPATIENT)
Age: 54
Setting detail: SPECIMEN
Discharge: HOME | End: 2019-05-27
Payer: MEDICARE

## 2019-05-27 LAB
A/G RATIO: 0.8 (ref 1.1–2.2)
ALBUMIN SERPL-MCNC: 3.8 G/DL (ref 3.4–5)
ALP BLD-CCNC: 254 U/L (ref 40–129)
ALT SERPL-CCNC: 8 U/L (ref 10–40)
ANION GAP SERPL CALCULATED.3IONS-SCNC: 14 MMOL/L (ref 3–16)
AST SERPL-CCNC: 15 U/L (ref 15–37)
BASOPHILS ABSOLUTE: 0.1 K/UL (ref 0–0.2)
BASOPHILS RELATIVE PERCENT: 1.1 %
BILIRUB SERPL-MCNC: 0.3 MG/DL (ref 0–1)
BUN BLDV-MCNC: 39 MG/DL (ref 7–20)
CALCIUM SERPL-MCNC: 9.4 MG/DL (ref 8.3–10.6)
CHLORIDE BLD-SCNC: 94 MMOL/L (ref 99–110)
CO2: 25 MMOL/L (ref 21–32)
CREAT SERPL-MCNC: 1.2 MG/DL (ref 0.9–1.3)
EOSINOPHILS ABSOLUTE: 0.4 K/UL (ref 0–0.6)
EOSINOPHILS RELATIVE PERCENT: 5.8 %
GFR AFRICAN AMERICAN: >60
GFR NON-AFRICAN AMERICAN: >60
GLOBULIN: 4.6 G/DL
GLUCOSE BLD-MCNC: 273 MG/DL (ref 70–99)
HCT VFR BLD CALC: 28.7 % (ref 40.5–52.5)
HEMOGLOBIN: 9.4 G/DL (ref 13.5–17.5)
LYMPHOCYTES ABSOLUTE: 1.1 K/UL (ref 1–5.1)
LYMPHOCYTES RELATIVE PERCENT: 15.5 %
MCH RBC QN AUTO: 25.6 PG (ref 26–34)
MCHC RBC AUTO-ENTMCNC: 32.7 G/DL (ref 31–36)
MCV RBC AUTO: 78.3 FL (ref 80–100)
MONOCYTES ABSOLUTE: 0.5 K/UL (ref 0–1.3)
MONOCYTES RELATIVE PERCENT: 7.4 %
NEUTROPHILS ABSOLUTE: 4.8 K/UL (ref 1.7–7.7)
NEUTROPHILS RELATIVE PERCENT: 70.2 %
PDW BLD-RTO: 20.7 % (ref 12.4–15.4)
PLATELET # BLD: 298 K/UL (ref 135–450)
PMV BLD AUTO: 9.7 FL (ref 5–10.5)
POTASSIUM SERPL-SCNC: 4.1 MMOL/L (ref 3.5–5.1)
RBC # BLD: 3.66 M/UL (ref 4.2–5.9)
SEDIMENTATION RATE, ERYTHROCYTE: 103 MM/HR (ref 0–20)
SODIUM BLD-SCNC: 133 MMOL/L (ref 136–145)
TOTAL PROTEIN: 8.4 G/DL (ref 6.4–8.2)
VANCOMYCIN TROUGH: 14.6 UG/ML (ref 10–20)
WBC # BLD: 6.8 K/UL (ref 4–11)

## 2019-05-27 PROCEDURE — 80202 ASSAY OF VANCOMYCIN: CPT

## 2019-05-27 PROCEDURE — 80053 COMPREHEN METABOLIC PANEL: CPT

## 2019-05-27 PROCEDURE — 86140 C-REACTIVE PROTEIN: CPT

## 2019-05-27 PROCEDURE — 85025 COMPLETE CBC W/AUTO DIFF WBC: CPT

## 2019-05-27 PROCEDURE — 85652 RBC SED RATE AUTOMATED: CPT

## 2019-05-27 PROCEDURE — 36415 COLL VENOUS BLD VENIPUNCTURE: CPT

## 2019-05-28 LAB — C-REACTIVE PROTEIN: 50.5 MG/L (ref 0–5.1)
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
## 2019-05-29 ENCOUNTER — HOSPITAL ENCOUNTER (OUTPATIENT)
Dept: WOUND CARE | Age: 54
Discharge: HOME | End: 2019-05-29
Payer: MEDICARE

## 2019-05-29 VITALS
DIASTOLIC BLOOD PRESSURE: 62 MMHG | BODY MASS INDEX: 29.65 KG/M2 | WEIGHT: 231 LBS | TEMPERATURE: 97.1 F | HEIGHT: 74 IN | SYSTOLIC BLOOD PRESSURE: 99 MMHG | RESPIRATION RATE: 16 BRPM | HEART RATE: 61 BPM

## 2019-05-29 PROCEDURE — 97598 DBRDMT OPN WND ADDL 20CM/<: CPT | Performed by: INTERNAL MEDICINE

## 2019-05-29 PROCEDURE — 97598 DBRDMT OPN WND ADDL 20CM/<: CPT

## 2019-05-29 PROCEDURE — 97597 DBRDMT OPN WND 1ST 20 CM/<: CPT

## 2019-05-29 PROCEDURE — 97605 NEG PRS WND THER DME<=50SQCM: CPT | Performed by: INTERNAL MEDICINE

## 2019-05-29 PROCEDURE — 99214 OFFICE O/P EST MOD 30 MIN: CPT | Performed by: INTERNAL MEDICINE

## 2019-05-29 PROCEDURE — 97597 DBRDMT OPN WND 1ST 20 CM/<: CPT | Performed by: INTERNAL MEDICINE

## 2019-05-29 PROCEDURE — 17250 CHEM CAUT OF GRANLTJ TISSUE: CPT | Performed by: INTERNAL MEDICINE

## 2019-05-29 PROCEDURE — 97605 NEG PRS WND THER DME<=50SQCM: CPT

## 2019-05-29 PROCEDURE — 17250 CHEM CAUT OF GRANLTJ TISSUE: CPT

## 2019-05-29 RX ORDER — LIDOCAINE 40 MG/G
CREAM TOPICAL ONCE
Status: DISCONTINUED | OUTPATIENT
Start: 2019-05-29 | End: 2019-05-30 | Stop reason: HOSPADM

## 2019-05-29 ASSESSMENT — PAIN SCALES - GENERAL: PAINLEVEL_OUTOF10: 0

## 2019-05-30 ENCOUNTER — TELEPHONE (OUTPATIENT)
Dept: INTERNAL MEDICINE CLINIC | Age: 54
End: 2019-05-30

## 2019-05-30 NOTE — PLAN OF CARE
Wounds overall stable or improved this week, debridement per MD as documented. Will cont. With current wound care regime. Reinforced for the patient to cont. With the good off-loading in bed as he has been doing. Pt. To cont. With IV antibiotics as ordered per Dr Radha Terry. F/u in [address] [place] [address] in 1 week as ordered. Discharge instructions reviewed with patient, all questions answered, copy given to patient. Dressings were applied to all wounds per M.D. Instructions at this visit.

## 2019-05-31 ENCOUNTER — TELEPHONE (OUTPATIENT)
Dept: INTERNAL MEDICINE CLINIC | Age: 54
End: 2019-05-31

## 2019-06-02 DIAGNOSIS — D63.8 ANEMIA OF CHRONIC DISORDER: ICD-10-CM

## 2019-06-03 RX ORDER — FERROUS SULFATE 325(65) MG
TABLET ORAL
Qty: 270 TABLET | Refills: 0 | Status: SHIPPED | OUTPATIENT
Start: 2019-06-03 | End: 2019-08-30 | Stop reason: SDUPTHER

## 2019-06-04 ENCOUNTER — HOSPITAL ENCOUNTER (OUTPATIENT)
Age: 54
Setting detail: SPECIMEN
Discharge: HOME | End: 2019-06-04
Payer: MEDICARE

## 2019-06-04 ENCOUNTER — TELEPHONE (OUTPATIENT)
Dept: WOUND CARE | Age: 54
End: 2019-06-04

## 2019-06-04 LAB
A/G RATIO: 0.8 (ref 1.1–2.2)
ALBUMIN SERPL-MCNC: 3.4 G/DL (ref 3.4–5)
ALP BLD-CCNC: 165 U/L (ref 40–129)
ALT SERPL-CCNC: 9 U/L (ref 10–40)
ANION GAP SERPL CALCULATED.3IONS-SCNC: 15 MMOL/L (ref 3–16)
AST SERPL-CCNC: 21 U/L (ref 15–37)
BASOPHILS ABSOLUTE: 0.1 K/UL (ref 0–0.2)
BASOPHILS RELATIVE PERCENT: 1.1 %
BILIRUB SERPL-MCNC: 0.3 MG/DL (ref 0–1)
BUN BLDV-MCNC: 54 MG/DL (ref 7–20)
C-REACTIVE PROTEIN: 75.1 MG/L (ref 0–5.1)
CALCIUM SERPL-MCNC: 9.1 MG/DL (ref 8.3–10.6)
CHLORIDE BLD-SCNC: 96 MMOL/L (ref 99–110)
CO2: 25 MMOL/L (ref 21–32)
CREAT SERPL-MCNC: 1.2 MG/DL (ref 0.9–1.3)
EOSINOPHILS ABSOLUTE: 0.4 K/UL (ref 0–0.6)
EOSINOPHILS RELATIVE PERCENT: 6.1 %
GFR AFRICAN AMERICAN: >60
GFR NON-AFRICAN AMERICAN: >60
GLOBULIN: 4.3 G/DL
GLUCOSE BLD-MCNC: 61 MG/DL (ref 70–99)
HCT VFR BLD CALC: 26.9 % (ref 40.5–52.5)
HEMOGLOBIN: 8.8 G/DL (ref 13.5–17.5)
LYMPHOCYTES ABSOLUTE: 1.1 K/UL (ref 1–5.1)
LYMPHOCYTES RELATIVE PERCENT: 15.9 %
MCH RBC QN AUTO: 25.9 PG (ref 26–34)
MCHC RBC AUTO-ENTMCNC: 32.9 G/DL (ref 31–36)
MCV RBC AUTO: 78.7 FL (ref 80–100)
MONOCYTES ABSOLUTE: 0.6 K/UL (ref 0–1.3)
MONOCYTES RELATIVE PERCENT: 7.8 %
NEUTROPHILS ABSOLUTE: 4.9 K/UL (ref 1.7–7.7)
NEUTROPHILS RELATIVE PERCENT: 69.1 %
PDW BLD-RTO: 20.4 % (ref 12.4–15.4)
PLATELET # BLD: 241 K/UL (ref 135–450)
PMV BLD AUTO: 9.4 FL (ref 5–10.5)
POTASSIUM SERPL-SCNC: 4.2 MMOL/L (ref 3.5–5.1)
RBC # BLD: 3.42 M/UL (ref 4.2–5.9)
SEDIMENTATION RATE, ERYTHROCYTE: 99 MM/HR (ref 0–20)
SODIUM BLD-SCNC: 136 MMOL/L (ref 136–145)
TOTAL PROTEIN: 7.7 G/DL (ref 6.4–8.2)
VANCOMYCIN TROUGH: 24.3 UG/ML (ref 10–20)
WBC # BLD: 7.1 K/UL (ref 4–11)

## 2019-06-04 PROCEDURE — 85652 RBC SED RATE AUTOMATED: CPT

## 2019-06-04 PROCEDURE — 80053 COMPREHEN METABOLIC PANEL: CPT

## 2019-06-04 PROCEDURE — 80202 ASSAY OF VANCOMYCIN: CPT

## 2019-06-04 PROCEDURE — 85025 COMPLETE CBC W/AUTO DIFF WBC: CPT

## 2019-06-04 PROCEDURE — 86140 C-REACTIVE PROTEIN: CPT

## 2019-06-04 PROCEDURE — 36415 COLL VENOUS BLD VENIPUNCTURE: CPT
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
## 2019-06-04 NOTE — TELEPHONE ENCOUNTER
Rosa with [address] can not get blood return. Was able to get enough for his labs today, but nothing extra. No blood return with needle change. Able to flush line. Patient knows he can infuse, but to call home care if he experiences any issues.   Wanting to know if patient to get taphflo here at hospital. [facility] #:[phone]
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
## 2019-06-05 ENCOUNTER — HOSPITAL ENCOUNTER (OUTPATIENT)
Dept: WOUND CARE | Age: 54
Discharge: HOME | End: 2019-06-05
Payer: MEDICARE

## 2019-06-05 VITALS
SYSTOLIC BLOOD PRESSURE: 96 MMHG | BODY MASS INDEX: 29.42 KG/M2 | TEMPERATURE: 97.4 F | DIASTOLIC BLOOD PRESSURE: 61 MMHG | RESPIRATION RATE: 16 BRPM | WEIGHT: 229.25 LBS | HEART RATE: 61 BPM | HEIGHT: 74 IN

## 2019-06-05 LAB
GLUCOSE BLD-MCNC: 58 MG/DL (ref 70–99)
GLUCOSE BLD-MCNC: 73 MG/DL (ref 70–99)
PERFORMED ON: ABNORMAL
PERFORMED ON: NORMAL

## 2019-06-05 PROCEDURE — 97597 DBRDMT OPN WND 1ST 20 CM/<: CPT | Performed by: INTERNAL MEDICINE

## 2019-06-05 PROCEDURE — 97598 DBRDMT OPN WND ADDL 20CM/<: CPT | Performed by: INTERNAL MEDICINE

## 2019-06-05 PROCEDURE — 97597 DBRDMT OPN WND 1ST 20 CM/<: CPT

## 2019-06-05 PROCEDURE — 17250 CHEM CAUT OF GRANLTJ TISSUE: CPT | Performed by: INTERNAL MEDICINE

## 2019-06-05 PROCEDURE — 97598 DBRDMT OPN WND ADDL 20CM/<: CPT

## 2019-06-05 PROCEDURE — 99214 OFFICE O/P EST MOD 30 MIN: CPT | Performed by: INTERNAL MEDICINE

## 2019-06-05 PROCEDURE — 17250 CHEM CAUT OF GRANLTJ TISSUE: CPT

## 2019-06-05 RX ORDER — LIDOCAINE HYDROCHLORIDE 40 MG/ML
SOLUTION TOPICAL ONCE
Status: DISCONTINUED | OUTPATIENT
Start: 2019-06-05 | End: 2019-06-06 | Stop reason: HOSPADM

## 2019-06-05 ASSESSMENT — PAIN DESCRIPTION - DESCRIPTORS: DESCRIPTORS: BURNING;STABBING

## 2019-06-05 ASSESSMENT — PAIN DESCRIPTION - PAIN TYPE: TYPE: CHRONIC PAIN

## 2019-06-05 ASSESSMENT — PAIN DESCRIPTION - ONSET: ONSET: ON-GOING

## 2019-06-05 ASSESSMENT — PAIN - FUNCTIONAL ASSESSMENT: PAIN_FUNCTIONAL_ASSESSMENT: ACTIVITIES ARE NOT PREVENTED

## 2019-06-05 ASSESSMENT — PAIN DESCRIPTION - ORIENTATION: ORIENTATION: LEFT

## 2019-06-05 ASSESSMENT — PAIN SCALES - GENERAL: PAINLEVEL_OUTOF10: 3

## 2019-06-05 ASSESSMENT — PAIN DESCRIPTION - LOCATION: LOCATION: NECK;SHOULDER

## 2019-06-05 ASSESSMENT — PAIN DESCRIPTION - FREQUENCY: FREQUENCY: CONTINUOUS

## 2019-06-05 ASSESSMENT — PAIN DESCRIPTION - PROGRESSION: CLINICAL_PROGRESSION: NOT CHANGED

## 2019-06-05 NOTE — PROGRESS NOTES
BS recheck. = 73. Patient has consumed 8 oz apple juice and 4 peanut butter crackers. Denies feeling hypoglycemic at this time. Above information communicated with ambulance personnel. He leaves via stretcher in stable condition.

## 2019-06-12 ENCOUNTER — HOSPITAL ENCOUNTER (OUTPATIENT)
Dept: WOUND CARE | Age: 54
Discharge: HOME | End: 2019-06-12
Payer: MEDICARE

## 2019-06-12 VITALS
WEIGHT: 230 LBS | HEART RATE: 59 BPM | TEMPERATURE: 97.2 F | SYSTOLIC BLOOD PRESSURE: 95 MMHG | DIASTOLIC BLOOD PRESSURE: 62 MMHG | RESPIRATION RATE: 16 BRPM | BODY MASS INDEX: 29.53 KG/M2

## 2019-06-12 PROBLEM — Z51.81 THERAPEUTIC DRUG MONITORING: Status: RESOLVED | Noted: 2019-05-14 | Resolved: 2019-06-12

## 2019-06-12 PROCEDURE — 97598 DBRDMT OPN WND ADDL 20CM/<: CPT | Performed by: INTERNAL MEDICINE

## 2019-06-12 PROCEDURE — 97597 DBRDMT OPN WND 1ST 20 CM/<: CPT | Performed by: INTERNAL MEDICINE

## 2019-06-12 PROCEDURE — 17250 CHEM CAUT OF GRANLTJ TISSUE: CPT

## 2019-06-12 PROCEDURE — 97598 DBRDMT OPN WND ADDL 20CM/<: CPT

## 2019-06-12 PROCEDURE — 97597 DBRDMT OPN WND 1ST 20 CM/<: CPT

## 2019-06-12 PROCEDURE — 17250 CHEM CAUT OF GRANLTJ TISSUE: CPT | Performed by: INTERNAL MEDICINE

## 2019-06-12 RX ORDER — LIDOCAINE HYDROCHLORIDE 40 MG/ML
SOLUTION TOPICAL ONCE
Status: DISCONTINUED | OUTPATIENT
Start: 2019-06-12 | End: 2019-06-13 | Stop reason: HOSPADM

## 2019-06-12 ASSESSMENT — PAIN DESCRIPTION - ONSET: ONSET: ON-GOING

## 2019-06-12 ASSESSMENT — PAIN DESCRIPTION - LOCATION: LOCATION: NECK;SHOULDER

## 2019-06-12 ASSESSMENT — PAIN DESCRIPTION - ORIENTATION: ORIENTATION: LEFT

## 2019-06-12 ASSESSMENT — ACTIVITIES OF DAILY LIVING (ADL): EFFECT OF PAIN ON DAILY ACTIVITIES: REPOSITIONING

## 2019-06-12 ASSESSMENT — PAIN DESCRIPTION - FREQUENCY: FREQUENCY: CONTINUOUS

## 2019-06-12 ASSESSMENT — PAIN DESCRIPTION - PROGRESSION: CLINICAL_PROGRESSION: NOT CHANGED

## 2019-06-12 ASSESSMENT — PAIN DESCRIPTION - PAIN TYPE: TYPE: CHRONIC PAIN

## 2019-06-12 ASSESSMENT — PAIN DESCRIPTION - DESCRIPTORS: DESCRIPTORS: BURNING;STABBING

## 2019-06-12 ASSESSMENT — PAIN - FUNCTIONAL ASSESSMENT: PAIN_FUNCTIONAL_ASSESSMENT: ACTIVITIES ARE NOT PREVENTED

## 2019-06-12 ASSESSMENT — PAIN SCALES - GENERAL: PAINLEVEL_OUTOF10: 3

## 2019-06-12 NOTE — PROGRESS NOTES
[facility] Progress Note    Sheri Jernigan     : 1965    DATE OF VISIT:  2019    Subjective:     Sheri Jernigan is a 48 y.o. male who has a pressure ulcer located on the left and right ischium, left  lower leg and foot (left , heel). Significant symptoms or pertinent wound history since last visit: doing ok overall, no CP or SOB, no F/C/D, eating well, offloading well. No recurrent TIA / CVA symptoms, in terms of arm shaking, HA, confusion, aphasia. Tolerating IV Abx well clinically - no sore throat or mouth, no N/V/D, no new rash. Port just barely gave enough blood return for lab work this week though, though it flushes and infuses well; he tells me that the home-care nurse re-stuck his Port 5 times to try to get better blood return, but I'm not sure why. .. Additional ulcer(s) noted? yes. T-spine surgical, right knee trauma/friction, right BKA dehiscence. His current medication list consists of Apixaban, Insulin Lispro, aspirin, blood glucose test strips, cyclobenzaprine, docusate sodium, ferrous sulfate, insulin glargine, loratadine, magnesium oxide, metFORMIN, metoclopramide, metoprolol succinate, metroNIDAZOLE, nitroGLYCERIN, nystatin, oxyCODONE, pantoprazole, polyethylene glycol, promethazine, rosuvastatin, senna, torsemide, traZODone HCl, and triamcinolone. He's also had almost 7 weeks of IV vancomycin and ertapenem at this point. Allergies: Benadryl [diphenhydramine hcl]; Statins [statins];  Cephalexin; Morphine; Penicillins; and Sulfa antibiotics    Objective:     Vitals:    19 1042   BP: 96/61   Pulse: 61   Resp: 16   Temp: 97.4 °F (36.3 °C)   TempSrc: Oral   Weight: 229 lb 4 oz (104 kg)   Height: 6' 2\" (1.88 m)     AAOx3, overweight, fatigued, pale, NAD  No icterus, thrush, abd tenderness, Port inflammation; stable to slightly improving arm rash  Intact left distal pulses, mild LLE edema, slightly increased RLE edema  No cellulitis, angitis, fluctuance  [place]-ulcer skin: generally pink and indurated, but still some tape tears far inferior to his back wound; lots of skin irritation, erythema, denudation from NPWT drape this week; left leg looks a little abraded and irritated, I think from friction. Ulcer(s): T-spine a bit larger this week, granular to hypergranular, stable exposed hardware, some fibrin and biofilm; left ischium very small, deep red, granular, a bit of fibrin and biofilm; external part of right ischial ulcer red, granular-hypergranular, biofilm and fibrin, distal tunnel seems shorter again this week, intermediate undermined area a bit less fibrotic, more granular; right knee still a bit hypergranular, friable; right BK stump mostly pink-red, but seems a bit flat and stagnant; left heel red, hypergranular, but smaller, a bit of fibrin and biofilm; left leg small, red, granular but a bit inflamed. Photos also saved in electronic chart.     Today's wound measurements:  Wound 02/06/19 #48, Right BKA amp. site-Medial, Dehisced Wound, full thickness, onset 2/5/19, gradually appeared-Wound Length (cm): 0.4 cm  Wound 02/06/19 #47, Right BKA amp site-Lateral, dehisced wound, full thickness, onset 2/5/19, gradually appeared-Wound Length (cm): 1.5 cm  Wound 04/10/19 # 50, Right Medial Knee, Venous, Partial Thickness, (onset 2018) Gradually appeared-Wound Length (cm): 1.9 cm  Wound 04/10/19 #49, Right Knee Anterior, Trauma, Partial Thickness, (onset 2018), gradually appeared-Wound Length (cm): 0.8 cm  Wound 12/06/18 #44 Left lateral lower leg, Pressure, Stage 3 (onset 12/4/18) Pressure-Wound Length (cm): 2 cm  Wound 10/18/17 #28 Left heel, Pressure, Stage 4, onset 10/11/17, pressure-Wound Length (cm): 4 cm  Wound 10/17/14 #8, right ischium, stage 4 PU, onset 7/15/14 (merged with #30), pressure-Wound Length (cm): 8.7 cm  Wound 01/23/19 #45, Left Ischium, Pressure, Stage 4, onset 1/16/19, Pressure -Wound Length (cm): 1.2 cm  Wound 08/16/17 #27- Thoracic spine, dehisced surgical wound, full thickness, onset 8/16/2017, pressure-Wound Length (cm): 7.2 cm    Wound 02/06/19 #48, Right BKA amp. site-Medial, Dehisced Wound, full thickness, onset 2/5/19, gradually appeared-Wound Width (cm): 2.5 cm  Wound 02/06/19 #47, Right BKA amp site-Lateral, dehisced wound, full thickness, onset 2/5/19, gradually appeared-Wound Width (cm): 2 cm  Wound 04/10/19 # 50, Right Medial Knee, Venous, Partial Thickness, (onset 2018) Gradually appeared-Wound Width (cm): 1.7 cm  Wound 04/10/19 #49, Right Knee Anterior, Trauma, Partial Thickness, (onset 2018), gradually appeared-Wound Width (cm): 0.6 cm  Wound 12/06/18 #44 Left lateral lower leg, Pressure, Stage 3 (onset 12/4/18) Pressure-Wound Width (cm): 0.4 cm  Wound 10/18/17 #28 Left heel, Pressure, Stage 4, onset 10/11/17, pressure-Wound Width (cm): 3.5 cm  Wound 10/17/14 #8, right ischium, stage 4 PU, onset 7/15/14 (merged with #30), pressure-Wound Width (cm): 3.5 cm  Wound 01/23/19 #45, Left Ischium, Pressure, Stage 4, onset 1/16/19, Pressure -Wound Width (cm): 0.4 cm  Wound 08/16/17 #27- Thoracic spine, dehisced surgical wound, full thickness, onset 8/16/2017, pressure-Wound Width (cm): 2 cm    Wound 02/06/19 #48, Right BKA amp. site-Medial, Dehisced Wound, full thickness, onset 2/5/19, gradually appeared-Wound Depth (cm): 0.1 cm  Wound 02/06/19 #47, Right BKA amp site-Lateral, dehisced wound, full thickness, onset 2/5/19, gradually appeared-Wound Depth (cm): 0.1 cm  Wound 04/10/19 # 50, Right Medial Knee, Venous, Partial Thickness, (onset 2018) Gradually appeared-Wound Depth (cm): 0.1 cm  Wound 04/10/19 #49, Right Knee Anterior, Trauma, Partial Thickness, (onset 2018), gradually appeared-Wound Depth (cm): 0.1 cm  Wound 12/06/18 #44 Left lateral lower leg, Pressure, Stage 3 (onset 12/4/18) Pressure-Wound Depth (cm): 0.1 cm  Wound 10/18/17 #28 Left heel, Pressure, Stage 4, onset 10/11/17, pressure-Wound Depth (cm): 0.1 cm  Wound 10/17/14 #8, right ischium, stage 4 PU, onset 7/15/14 (merged with #30), pressure-Wound Depth (cm): 2 cm  Wound 01/23/19 #45, Left Ischium, Pressure, Stage 4, onset 1/16/19, Pressure -Wound Depth (cm): 0.3 cm  Wound 08/16/17 #27- Thoracic spine, dehisced surgical wound, full thickness, onset 8/16/2017, pressure-Wound Depth (cm): 0.5 cm  ____________________________    From this week -- Na 136, K 4.2, BUN up to 54, creat 1.2, cRP up to 75, albumin 3.4, liver enzymes normal (alk phos still up at 165 but lower than the last couple of weeks), VT 24.3 (up sharply from 14.6, still just on 1000 mg q48), WBC 7.1, Hgb 8.8, plts 241k, 400 Eos, ESR 99. 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  _______________    To encourage better epithelial cell coverage, I did use AgNO3 to chemically cauterize hypergranulation tissue on the right knee ulcer(s), after application of 4% lidocaine topical solution. This was tolerated well, with no pain or skin injury. Discharge plan:     Treatment in the wound care center today: Wound 02/06/19 #48, Right BKA amp. site-Medial, Dehisced Wound, full thickness, onset 2/5/19, gradually appeared-Dressing/Treatment: (hydrogel procellera 4x4's kerlix spandagrip)  Wound 02/06/19 #47, Right BKA amp site-Lateral, dehisced wound, full thickness, onset 2/5/19, gradually appeared-Dressing/Treatment: (hydrogel procellera 4x4's kerlix spandagrip)  Wound 04/10/19 # 50, Right Medial Knee, Venous, Partial Thickness, (onset 2018) Gradually appeared-Dressing/Treatment: (hydrogel procellera 4x4's kerlix spandagrip)  Wound 04/10/19 #49, Right Knee Anterior, Trauma, Partial Thickness, (onset 2018), gradually appeared-Dressing/Treatment: (hydrogel procellera 4x4's kerlix spandagrip)  Wound 12/06/18 #44 Left lateral lower leg, Pressure, Stage 3 (onset 12/4/18) Pressure-Dressing/Treatment: (calmoseptine nonadherent dry dressing)  Wound 10/18/17 #28 Left heel, Pressure, Stage 4, onset 10/11/17, pressure-Dressing/Treatment: (vashe calmoseptine yoni daptic 4x4's ABD football dressing)  Wound 10/17/14 #8, right ischium, stage 4 PU, onset 7/15/14 (merged with #30), pressure-Dressing/Treatment: (vashe calmoseptine silver alginate 4x4's ABD)  Wound 01/23/19 #45, Left Ischium, Pressure, Stage 4, onset 1/16/19, Pressure -Dressing/Treatment: (calmoseptine silver alginate 4x4's ABD)  Wound 08/16/17 #27- Thoracic spine, dehisced surgical wound, full thickness, onset 8/16/2017, pressure-Dressing/Treatment: (calmoseptine silver alginate drawtex 4x4's ABD).     Decrease vancomycin to 750 mg q48, keep ertapenem at 1000 mg q24, plan to stop both of these on Sunday (approx 8 weeks of total therapy). De-access Port on Monday. Will watch closely for allergic manifestations, Candidiasis, Cdiff, PICC complications, etc; weekly labs ordered, weekly PICC dressings being done. Triamcinolone to arm rash BID PRN. Keep up good work with offloading, protein intake, glucose control. Keep left and right lower extremity dressings in place for the week if possible. We increased RLE compression this week, a little. Holding NPWT this week to allow the periwound skin to recover, and see if that thigh tunnel relaxes a bit, or even fills in better on its own. Home treatment: good handwashing before and after any dressing changes. Cleanse wound with saline or soap & water before dressing change. May use Vaseline (petrolatum), Aquaphor, Aveeno, CeraVe, Cetaphil, Eucerin, Lubriderm, etc for dry skin. Dressing type for home: as above for the back and ischial ulcers, three times weekly. Written discharge instructions given to patient. Follow up in 1 week.     Electronically signed by Colleen Mota MD on 6/12/2019 at 7:49 AM.

## 2019-06-13 ENCOUNTER — HOSPITAL ENCOUNTER (EMERGENCY)
Age: 54
Discharge: HOME | End: 2019-06-13
Attending: EMERGENCY MEDICINE
Payer: MEDICARE

## 2019-06-13 VITALS
SYSTOLIC BLOOD PRESSURE: 100 MMHG | BODY MASS INDEX: 29.77 KG/M2 | OXYGEN SATURATION: 100 % | HEART RATE: 57 BPM | DIASTOLIC BLOOD PRESSURE: 55 MMHG | WEIGHT: 232 LBS | TEMPERATURE: 98.2 F | HEIGHT: 74 IN | RESPIRATION RATE: 15 BRPM

## 2019-06-13 DIAGNOSIS — T14.8XXA OPEN WOUND: Primary | ICD-10-CM

## 2019-06-13 DIAGNOSIS — L89.314 PRESSURE INJURY OF RIGHT ISCHIUM, STAGE 4 (HCC): ICD-10-CM

## 2019-06-13 LAB
A/G RATIO: 0.8 (ref 1.1–2.2)
ALBUMIN SERPL-MCNC: 3.5 G/DL (ref 3.4–5)
ALP BLD-CCNC: 398 U/L (ref 40–129)
ALT SERPL-CCNC: 44 U/L (ref 10–40)
ANION GAP SERPL CALCULATED.3IONS-SCNC: 18 MMOL/L (ref 3–16)
APTT: 41.4 SEC (ref 26–36)
AST SERPL-CCNC: 32 U/L (ref 15–37)
BASOPHILS ABSOLUTE: 0.1 K/UL (ref 0–0.2)
BASOPHILS RELATIVE PERCENT: 1.3 %
BILIRUB SERPL-MCNC: 0.5 MG/DL (ref 0–1)
BUN BLDV-MCNC: 54 MG/DL (ref 7–20)
CALCIUM SERPL-MCNC: 9.2 MG/DL (ref 8.3–10.6)
CHLORIDE BLD-SCNC: 91 MMOL/L (ref 99–110)
CO2: 25 MMOL/L (ref 21–32)
CREAT SERPL-MCNC: 1.5 MG/DL (ref 0.9–1.3)
EOSINOPHILS ABSOLUTE: 0.5 K/UL (ref 0–0.6)
EOSINOPHILS RELATIVE PERCENT: 6.7 %
GFR AFRICAN AMERICAN: 60
GFR NON-AFRICAN AMERICAN: 49
GLOBULIN: 4.5 G/DL
GLUCOSE BLD-MCNC: 266 MG/DL (ref 70–99)
HCT VFR BLD CALC: 29.3 % (ref 40.5–52.5)
HEMOGLOBIN: 9.6 G/DL (ref 13.5–17.5)
INR BLD: 1.56 (ref 0.86–1.14)
LYMPHOCYTES ABSOLUTE: 1.3 K/UL (ref 1–5.1)
LYMPHOCYTES RELATIVE PERCENT: 18.2 %
MCH RBC QN AUTO: 26.2 PG (ref 26–34)
MCHC RBC AUTO-ENTMCNC: 32.7 G/DL (ref 31–36)
MCV RBC AUTO: 80.3 FL (ref 80–100)
MONOCYTES ABSOLUTE: 0.6 K/UL (ref 0–1.3)
MONOCYTES RELATIVE PERCENT: 7.8 %
NEUTROPHILS ABSOLUTE: 4.8 K/UL (ref 1.7–7.7)
NEUTROPHILS RELATIVE PERCENT: 66 %
PDW BLD-RTO: 18.8 % (ref 12.4–15.4)
PLATELET # BLD: 275 K/UL (ref 135–450)
PMV BLD AUTO: 9.2 FL (ref 5–10.5)
POTASSIUM REFLEX MAGNESIUM: 3.8 MMOL/L (ref 3.5–5.1)
PROTHROMBIN TIME: 17.8 SEC (ref 9.8–13)
RBC # BLD: 3.65 M/UL (ref 4.2–5.9)
SODIUM BLD-SCNC: 134 MMOL/L (ref 136–145)
TOTAL PROTEIN: 8 G/DL (ref 6.4–8.2)
WBC # BLD: 7.3 K/UL (ref 4–11)

## 2019-06-13 PROCEDURE — 80053 COMPREHEN METABOLIC PANEL: CPT

## 2019-06-13 PROCEDURE — 85610 PROTHROMBIN TIME: CPT

## 2019-06-13 PROCEDURE — 99282 EMERGENCY DEPT VISIT SF MDM: CPT

## 2019-06-13 PROCEDURE — 85025 COMPLETE CBC W/AUTO DIFF WBC: CPT

## 2019-06-13 PROCEDURE — 85730 THROMBOPLASTIN TIME PARTIAL: CPT

## 2019-06-13 NOTE — ED PROVIDER NOTES
I independently examined and evaluated Joe Fuentes. In brief, patient presenting for evaluation of bleeding from his wound. He was seen at the wound care center and had a debrided. The home health team placed a wound VAC on it and since then it has been continuously bleeding. .    Focused exam revealed patient is overall well-appearing in no acute distress. The right leg is noted to be amputated with an AKA. He is awake and alert. Ostomy noted to the left lower abdomen. Patient was rolled and noted to have tunneling wound on his bottom. I did evaluate the patient after he was seen by Dr. Amberly Kerns  in the ER and the wound has since been cauterized. No active bleeding at this time. ED course: Patient presenting for evaluation from bleeding wound. Hemoglobin stable. No tachycardia. Patient does have slight elevation of his creatinine to 1.5 with baseline around 1.1-1.2. He states that he was recently on vancomycin. I advised him have this rechecked closely in the next several days to week. He states that he has been eating and drinking normally. He states that the home health care nurse is going to come check on him tomorrow and also will do a wound check and dressing change on Saturday. Patient will be discharged home at this time and is very much in agreement of this. All questions answered at time of discharge. All diagnostic, treatment, and disposition decisions were made by myself in conjunction with the advanced practice provider. For all further details of the patient's emergency department visit, please see the advanced practice provider's documentation. Comment: Please note this report has been produced using speech recognition software and may contain errors related to that system including errors in grammar, punctuation, and spelling, as well as words and phrases that may be inappropriate.  If there are any questions or concerns please feel free to contact the dictating provider for clarification.         Edilia Geronimo MD  06/13/19 0870

## 2019-06-13 NOTE — ED PROVIDER NOTES
[address] ED  EMERGENCY DEPARTMENT ENCOUNTER        Pt Name: Rose Barclay  MRN: [id number]  [place] 1965  Date of evaluation: 6/13/2019  Provider: DARIAN Hawley - RODRIGO  PCP: Miladis Valle MD    This patient was seen and evaluated by the attending physician Mckenna Dos Santos MD.      [facility]       Chief Complaint   Patient presents with    Wound Check     Pt ws seen at wound care yesterday for a wound on the buttocks, Pt wound started bleeding today and home care was unable to stop the bleeding. Pt denies any dizziness and pain at this time. HISTORY OF PRESENT ILLNESS   (Location/Symptom, Timing/Onset, Context/Setting, Quality, Duration, Modifying Factors, Severity)  Note limiting factors. Rose Barclay is a 48 y.o. male who presents for evaluation of bleeding. Patient states that he has a stage IV pressure ulcer on his right ischium. States that he saw wound care on Wednesday, the area was debrided, and a dressing placed. The bleeding had subsided, home health attempted to place a wound VAC, and in approximately 2 hours there was 100 mL of bright red blood in the wound VAC. The wound VAC was removed, the wound care physician was called, he instructed patient and home health nurse to hold pressure for approximately 15 minutes, and apply a dressing. Patient states that he continues to have bleeding from his pressure ulcer soaking through multiple dressings, sheets, and chux. Patient has a history of pressure ulcers and is a paraplegic. Patient denies chest pain, shortness of breath, fever, chills, cough, and congestion. Nursing Notes were all reviewed and agreed with or any disagreements were addressed  in the HPI. REVIEW OF SYSTEMS    (2-9 systems for level 4, 10 or more for level 5)     Review of Systems    Positives and Pertinent negatives as per HPI. Except as noted abovein the ROS, all other systems were reviewed and negative. 9/28/2017    Other chronic osteomyelitis, left ankle and foot ([address] 5/30/2017    Pilonidal cyst     PONV (postoperative nausea and vomiting)     Pressure ulcer of both lower legs 8/29/2014    Pressure ulcer of right heel, stage 4 ([address] 12/14/2016    Pressure ulcer of right hip, stage 4 ([address] 1/14/2015    Pyogenic arthritis, upper arm ([address] 8/10/2013    Sepsis ([address] 7/13/2014    Sleep apnea     Stroke ([address] 05/14/2019    TIA    Symptomatic anemia 1/7/2018    Thrush     UTI (urinary tract infection) due to urinary indwelling catheter ([address] 8/20/2014         SURGICAL HISTORY     Past Surgical History:   Procedure Laterality Date    ABDOMEN SURGERY      BACK SURGERY      T6-T11 hardware    CARDIAC CATHETERIZATION  10/2017    3 stents placed    CENTRAL VENOUS CATHETER Bilateral multiple    COLONOSCOPY  11/12/2009    COLOSTOMY  2013    COSMETIC SURGERY      CYSTOSCOPY  7/16/14    to clear for straight-cath plan    ENDOSCOPY, COLON, DIAGNOSTIC      EYE SURGERY      FRACTURE SURGERY      HERNIA REPAIR      umbilical, inguinal     INSERTABLE CARDIAC MONITOR  11/2016    INSERTABLE CARDIAC MONITOR      INSERTION / REMOVAL / REPLACEMENT VENOUS ACCESS CATHETER Right 1/17/2019    PORT INSERTION performed by Jose Arreola MD at [facility] Left     ACL, MCl, PCL    LEG AMPUTATION BELOW KNEE Right 01/15/2019    LEG AMPUTATION BELOW KNEE Right 1/15/2019    BELOW KNEE AMPUTATION [facility]   [address] [place] [address]      with hardware    OTHER SURGICAL HISTORY      Sacral decubitus flap    OTHER SURGICAL HISTORY Left 2/25/16    DEBRIDEMENT OF LEFT ISCHIAL WOUND         OTHER SURGICAL HISTORY Right 10/13/2016    EXCISION INFECTED BONE AND TISSUE RIGHT FOOT    OTHER SURGICAL HISTORY Left 02/02/2017    debridement infected tissue left foot    OTHER SURGICAL HISTORY Left 05/25/2017    ULCER DEBRIDEMENT LEFT FOOT     TWO TABLETS BY MOUTH DAILY    TRAZODONE HCL PO    Take 25 mg by mouth nightly    TRIAMCINOLONE (KENALOG) 0.1 % CREAM    Apply to left arm rash 2 times daily. ALLERGIES     Benadryl [diphenhydramine hcl]; Statins [statins];  Cephalexin; Morphine; Penicillins; and Sulfa antibiotics    FAMILYHISTORY       Family History   Problem Relation Age of Onset    Arthritis Mother     Cancer Mother     Diabetes Mother     High Blood Pressure Mother     High Cholesterol Mother     Miscarriages / [place] Mother     Cancer Father     Diabetes Father     Early Death Father     Heart Disease Father     High Cholesterol Father     High Blood Pressure Maternal Uncle     Kidney Disease Maternal Uncle     Heart Disease Maternal Grandmother           SOCIAL HISTORY       Social History     Socioeconomic History    Marital status:      Spouse name: None    Number of children: None    Years of education: None    Highest education level: None   Occupational History    None   Social Needs    Financial resource strain: None    Food insecurity:     Worry: None     Inability: None    Transportation needs:     Medical: None     Non-medical: None   Tobacco Use    Smoking status: Never Smoker    Smokeless tobacco: Never Used   Substance and Sexual Activity    Alcohol use: No    Drug use: No    Sexual activity: None     Comment: not assessed   Lifestyle    Physical activity:     Days per week: None     Minutes per session: None    Stress: None   Relationships    Social connections:     Talks on phone: None     Gets together: None     Attends [employer] service: None     Active member of club or organization: None     Attends meetings of clubs or organizations: None     Relationship status: None    Intimate partner violence:     Fear of current or ex partner: None     Emotionally abused: None     Physically abused: None     Forced sexual activity: None   Other Topics Concern    None   Social History Narrative    None       SCREENINGS             PHYSICAL EXAM    (up to 7 for level 4, 8 or more for level 5)     ED Triage Vitals [06/13/19 1609]   BP Temp Temp Source Pulse Resp SpO2 Height Weight   107/67 98.2 °F (36.8 °C) Oral 70 15 100 % 6' 2\" (1.88 m) 232 lb (105.2 kg)       Physical Exam   Constitutional: He is oriented to person, place, and time. He appears well-developed and well-nourished. HENT:   Head: Normocephalic and atraumatic. Nose: Nose normal.   Eyes: Right eye exhibits no discharge. Left eye exhibits no discharge. Neck: Normal range of motion. Neck supple. Cardiovascular: Normal rate, regular rhythm and normal heart sounds. Pulmonary/Chest: Effort normal and breath sounds normal. No respiratory distress. Abdominal: Soft. Bowel sounds are normal.   ostomy to LLQ   Genitourinary:   Genitourinary Comments: Condom cath in place draining yellow urine. Musculoskeletal: Normal range of motion. Feet:   Right Foot: amputated  Neurological: He is alert and oriented to person, place, and time. Skin: Skin is warm and dry. He is not diaphoretic. Right below the knee amputation. Dressing and boot to left foot    Pressure ulcer stage IV to the right ischium, bright red blood oozing noted from wound. See pictures below      Psychiatric: He has a normal mood and affect. His behavior is normal.   Nursing note and vitals reviewed.                 DIAGNOSTIC RESULTS   LABS:    Labs Reviewed   CBC WITH AUTO DIFFERENTIAL - Abnormal; Notable for the following components:       Result Value    RBC 3.65 (*)     Hemoglobin 9.6 (*)     Hematocrit 29.3 (*)     RDW 18.8 (*)     All other components within normal limits    Narrative:     Performed at:  [facility],  [place], [facility]   Phone [phone]   COMPREHENSIVE METABOLIC PANEL W/ REFLEX TO MG FOR LOW K - Abnormal; Notable for the following components:    Sodium 134 (*)     Chloride 91 (*) Anion Gap 18 (*)     Glucose 266 (*)     BUN 54 (*)     CREATININE 1.5 (*)     GFR Non- 49 (*)     GFR African American 60 (*)     Albumin/Globulin Ratio 0.8 (*)     Alkaline Phosphatase 398 (*)     ALT 44 (*)     All other components within normal limits    Narrative:     Performed at:  [facility],  [place], [facility]   Phone [phone]   PROTIME-INR - Abnormal; Notable for the following components:    Protime 17.8 (*)     INR 1.56 (*)     All other components within normal limits    Narrative:     Performed at:  [facility],  [place], [facility]   Phone [phone]   APTT - Abnormal; Notable for the following components:    aPTT 41.4 (*)     All other components within normal limits    Narrative:     Performed at:  [facility]) [facility],  [place], [facility]   Phone [phone]       All other labs were within normal range or not returned as of this dictation. EKG: All EKG's are interpreted by the Emergency Department Physician who either signs orCo-signs this chart in the absence of a cardiologist.  Please see their note for interpretation of EKG. RADIOLOGY:   Non-plain film images such as CT, Ultrasound and MRI are read by the radiologist. Plain radiographic images are visualized andpreliminarily interpreted by the  ED Provider with the below findings:        Interpretation perthe Radiologist below, if available at the time of this note:    No orders to display     No results found.        PROCEDURES   Unless otherwise noted below, none     Procedures    CRITICAL CARE TIME   N/A    CONSULTS:  None      EMERGENCY DEPARTMENT COURSE and DIFFERENTIALDIAGNOSIS/MDM:   Vitals:    Vitals:    06/13/19 1609 06/13/19 1916   BP: 107/67 (!) 100/55   Pulse: 70 57   Resp: 15 15   Temp: 98.2 °F (36.8 °C) 98.2 °F (36.8 °C)   TempSrc: Oral Oral   SpO2: 100%    Weight: 232 lb (105.2 kg)    Height: 6' 2\" (1.88 m)        Patient was given thefollowing medications:  Medications - No data to display    Patient is nontoxic, in no apparent distress, laying in bed. Patient states that he did text Dr. Jitendra Leon to inform him of his bleeding wound. Dressing applied to oozing wound. Patient informed that we will contact wound care. Lab work ordered. 1 - Dr. Ham Shocurt at bedside to assess wound. Dr. Jitendra Leon cauterized oozing areas of wound. Dressing applied and at this provider will recheck wound prior to discharge. 1820 -patient rolled, no bleeding noted from wound, dressing applied using Adaptic and an ABD pad. Patient states that home health nurse will be there tomorrow to check wound and dressing. Patient informed that his creatinine was slightly elevated at 1.5 and his baseline is 1.1-1.2. He states that he recently took vancomycin. Patient advised to have his labs rechecked. The rest of patient's lab work is stable. Patient to be discharged at this time, instructed to monitor wound for bleeding, instructed to keep appointment with wound care, and return for any worsening symptoms. Differential diagnoses include but are not limited to necrotizing fasciitis, cellulitis, abscess, and pressure ulcer      FINAL IMPRESSION      1. Open wound    2.  Pressure injury of right ischium, stage 4 [facility])          DISPOSITION/PLAN   DISPOSITION        PATIENT REFERREDTO:  Miladis Valle MD  [address]   [address]  [phone]    Schedule an appointment as soon as possible for a visit in 2 days      [facility] [place] [facility]  [address] [facility]  [phone]    If symptoms worsen    Loyda Painter MD  [address] [facility] [address]  [phone]      For wound re-check, keep scheduled appointment      DISCHARGE MEDICATIONS:  Discharge Medication List as of 6/13/2019  6:56 PM          DISCONTINUED

## 2019-06-13 NOTE — ED NOTES
Patient changed and cleaned up, Patient requested to self cath before we closed the attends up.       Sin Acosta  06/13/19 9202

## 2019-06-18 NOTE — PROGRESS NOTES
[facility] Progress Note    Mone Toledo     : 1965    DATE OF VISIT:  2019    Subjective:     Mone Toledo is a 48 y.o. male who has a pressure ulcer located on the left and right ischium and foot (left , heel). Significant symptoms or pertinent wound history since last visit: feeling ok, no F/C/D, no sore throat or mouth, no N/V/D, no CP or SOB. Port was de-accessed earlier this week. Doing well with offloading and nutritional intake. Thinks the arm rash is fading (with Abx cessation). Additional ulcer(s) noted? yes. Right knee, T-spine and right BK stump; left lower leg looks delicately healed. His current medication list consists of Apixaban, Insulin Lispro, aspirin, blood glucose test strips, cyclobenzaprine, ferrous sulfate, insulin glargine, loratadine, magnesium oxide, metFORMIN, metoclopramide, metoprolol succinate, metroNIDAZOLE, nitroGLYCERIN, nystatin, oxyCODONE, pantoprazole, polyethylene glycol, promethazine, rosuvastatin, senna, torsemide, traZODone HCl, and triamcinolone. Completed approx 8 weeks of IV vancomycin and ertapenem. Allergies: Benadryl [diphenhydramine hcl]; Statins [statins]; Cephalexin; Morphine; Penicillins; and Sulfa antibiotics    Objective:     Vitals:    19 1043 19 1121   BP: 95/62    Pulse: 59    Resp: 16    Temp: 97.2 °F (36.2 °C)    TempSrc: Oral    Weight:  230 lb (104.3 kg)     AAOx3, overweight, a bit fatigued, NAD  No icterus, thrush, abd tenderness, cutaneous Candidiasis; fading arm rash (presumed med allergy)  Intact left pedal pulses, mild BL LE edema  No cellulitis, angitis, fluctuance, acute arthritis or bursitis  [place]-ulcer skin: generally pink and indurated, not at all inflamed at left heel, a bit of dressing erythema at right ischium.   Ulcer(s): T-spine smaller again, some granulation, fibrin, biofilm, small tunnels associated with hardware; left ischium small, deep red, granular, fibrin; right ischium with main part of the ulcer hypergranular, fibrin and biofilm, then almost a tiny healed skin bridge, then the undermined patch toward the thigh (more granular), then the persistent tunnel down along the lateral thigh (deep to SQ tissue, along fascial planes); right knee about the same, pink-red, soft, hypergranular, friable but clean; BK ulcers smaller, deep red, better granulation, less inflamed and fibrotic; left leg very delicately healed; left heel red, soft, hyergranulation, but clean, no necrosis or definite biofilm this week. Photos also saved in electronic chart.     Today's wound measurements:  Wound 04/10/19 #49, Right Knee Anterior, Trauma, Partial Thickness, (onset 2018), gradually appeared-Wound Length (cm): 0.6 cm  Wound 04/10/19 # 50, Right Medial Knee, Venous, Partial Thickness, (onset 2018) Gradually appeared-Wound Length (cm): 2.7 cm  Wound 02/06/19 #47, Right BKA amp site-Lateral, dehisced wound, full thickness, onset 2/5/19, gradually appeared-Wound Length (cm): 1.7 cm  Wound 02/06/19 #48, Right BKA amp. site-Medial, Dehisced Wound, full thickness, onset 2/5/19, gradually appeared-Wound Length (cm): 0.5 cm  Wound 12/06/18 #44 Left lateral lower leg, Pressure, Stage 3 (onset 12/4/18) Pressure-Wound Length (cm): 0 cm  Wound 10/18/17 #28 Left heel, Pressure, Stage 4, onset 10/11/17, pressure-Wound Length (cm): 3 cm  Wound 10/17/14 #8, right ischium, stage 4 PU, onset 7/15/14 (merged with #30), pressure-Wound Length (cm): 6 cm  Wound 01/23/19 #45, Left Ischium, Pressure, Stage 4, onset 1/16/19, Pressure -Wound Length (cm): 1.2 cm  Wound 08/16/17 #27- Thoracic spine, dehisced surgical wound, full thickness, onset 8/16/2017, pressure-Wound Length (cm): 7 cm    Wound 04/10/19 #49, Right Knee Anterior, Trauma, Partial Thickness, (onset 2018), gradually appeared-Wound Width (cm): 0.6 cm  Wound 04/10/19 # 50, Right Medial Knee, Venous, Partial Thickness, (onset 2018) Gradually appeared-Wound Width Patient Active Problem List   Diagnosis Code    Mixed hyperlipidemia E78.2    Coronary artery disease involving native coronary artery of native heart without angina pectoris I25.10    Type 2 diabetes mellitus with skin complication, with long-term current use of insulin ([facility]) E11.628, Z79.4    Paraplegia, complete ([facility]) G82.21    Chronic back pain M54.9, G89.29    Arthritis M19.90    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    Pressure ulcer of left heel, stage 4 ([facility]) L89.624    LIBORIO on CPAP G47.33, Z99.89    Below knee amputation status, right ([facility]) Z89.511    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, initial encounter T81.31XA    Pressure ulcer of left ischium, stage 4 ([facility]) L89.324    Traumatic ulcer of lower extremity, right, limited to breakdown of skin ([facility] L97.911    TIA (transient ischemic attack) G45.9       Assessment of today's active condition(s): DM, paraplegia, multiple nonhealing ulcers due to pressure, diabetes, prior surgeries, prior infections. Left calcaneal osteo seems to be resolved, left leg healed, left foot should do ok with ongoing local care, mgmt of hypergranulation and offloading. Right BK stump seems better with more compression; right knee being more stubborn than I anticipated, and he insists that there is no friction or pressure on that area at home. Left ischium almost healed.  T-spine care largely palliative, at least until we can discuss the possibility of surgery to remove exposed hardware. Right ischium is still the one wound with the greatest potential for improvement with what we can do here (ongoing NPWT, but unroofing that thigh tunnel again; I did that once before, and it helped to an extent, but he was admitted with severe bleeding; this time the tunnel is smaller and he's off Plavix so I'm hopeful that he will do ok). Factors contributing to occurrence and/or persistence of the chronic ulcer include edema, diabetes, chronic pressure, decreased mobility and shear force. Medical necessity of today's visit is shown by the above documentation. Sharp debridement is indicated today, based upon the exam findings in the wound(s) above. Procedure note:     Consent obtained. Time out performed per [facility] policy. Anesthetic  Anesthetic: 2% Lidocaine Injectable with Epinephrine(injected per Dr Odessa Salomon) -- into the SQ tissue along the thigh tunnel of the right ischial ulcer. Using a curette, I sharply debrided the back (midline) and left and right ischium ulcer(s) down through and including the removal of dermis. The type(s) of tissue debrided included fibrin, biofilm and exudate. Total Surface Area Debrided: 22 sq cm. I also used a #10 blade to incise the right lateral thigh skin and SQ tissue along the length of the tunnel, then bluntly dissected to basically marsupialize the tunnel that was present, and incorporate it into the new wound bed -- should make NPWT more effective and easier to manage as well; no real signs of necrosis or infection found. The ulcers were then irrigated with normal saline solution. The procedure was completed with a moderate amount of bleeding, and hemostasis was with pressure, with silver nitrate stick(s) and with ORC (cellulose). The patient tolerated the procedure well, with no significant complications. The patient's level of pain during and after the procedure was monitored.  Post-debridement measurements, if different from pre-debridement, are in delicately healed ulcer, and can be kept in place for the week. After the right ischium and thigh tunnel were dressed today (as above), I placed a 4x4 bolster over the area of the incised thigh tunnel and wrapped his thigh tightly with Coban, to try to decrease the odds of bleeding between now and when he's stably back at home in bed. The Coban and bolster should come off in a few hours. No additional Abx planned for the left heel for now. Triamcinolone PRN BID to the arm rash, though it should be resolving now. Will need to watch T-spine closely with Abx off, as I presume there is still active hardware infection and osteomyelitis there - that's not going to be curable without surgery, but we might need to manage a soft tissue infection flare-up, or change wound-care details if drainage changes. Keep up good work with offloading, minimal chair time, glucose control, protein intake. Home treatment: good handwashing before and after any dressing changes. Cleanse wound with saline or soap & water before dressing change. May use Vaseline (petrolatum), Aquaphor, Aveeno, CeraVe, Cetaphil, Eucerin, Lubriderm, etc for dry skin. Dressing type for home: as above for the T-spine and left ischium TIW; for the right ischium, can resume NPWT with foam tomorrow, as long as there is no active bleeding, also change TIW. Written discharge instructions given to patient. Follow up in 1 week.     Electronically signed by Sahra Jennings MD on 6/18/2019 at 3:37 PM.

## 2019-06-19 ENCOUNTER — HOSPITAL ENCOUNTER (OUTPATIENT)
Dept: WOUND CARE | Age: 54
Discharge: HOME | End: 2019-06-19
Payer: MEDICARE

## 2019-06-19 VITALS
SYSTOLIC BLOOD PRESSURE: 94 MMHG | TEMPERATURE: 98.6 F | DIASTOLIC BLOOD PRESSURE: 56 MMHG | RESPIRATION RATE: 16 BRPM | BODY MASS INDEX: 29.52 KG/M2 | HEART RATE: 88 BPM | HEIGHT: 74 IN | WEIGHT: 230 LBS

## 2019-06-19 PROCEDURE — 97597 DBRDMT OPN WND 1ST 20 CM/<: CPT

## 2019-06-19 PROCEDURE — 11042 DBRDMT SUBQ TIS 1ST 20SQCM/<: CPT

## 2019-06-19 PROCEDURE — 97597 DBRDMT OPN WND 1ST 20 CM/<: CPT | Performed by: INTERNAL MEDICINE

## 2019-06-19 PROCEDURE — 17250 CHEM CAUT OF GRANLTJ TISSUE: CPT | Performed by: INTERNAL MEDICINE

## 2019-06-19 PROCEDURE — 17250 CHEM CAUT OF GRANLTJ TISSUE: CPT

## 2019-06-19 PROCEDURE — 11042 DBRDMT SUBQ TIS 1ST 20SQCM/<: CPT | Performed by: INTERNAL MEDICINE

## 2019-06-19 RX ORDER — LIDOCAINE 40 MG/G
CREAM TOPICAL ONCE
Status: DISCONTINUED | OUTPATIENT
Start: 2019-06-19 | End: 2019-06-20 | Stop reason: HOSPADM

## 2019-06-19 ASSESSMENT — PAIN SCALES - GENERAL: PAINLEVEL_OUTOF10: 0

## 2019-06-20 NOTE — PLAN OF CARE
04/10/2019  1:10 PM [facility]   Light growth    Organism Abnormal  04/10/2019  1:10 PM [facility]   Staph aureus MRSA    WOUND/ABSCESS Abnormal  04/10/2019  1:10 PM [facility]   Light growth        Recent imaging:  No imaging of current wounds in the last year. Recent antibiotic Rx:  4/2019 Vancomycin & Invanz for left heel cultures & osteo     5. Topical therapies --    Previous dressings on current wound(s):  Hypochlorous acid spray, Flagyl powder, Betadine, Triamcinolone (for dermatitis or hypergranulation PRN), Adaptic, Xeroform, Procellera, kept moist, Hydrogel, Triad dressing, Mariana, AMD gauze packing, Iodoform packing, Silver alginate (moisten if wound becomes dry), DrawTex, Foam, Hydrocolloid, Superabsorbent, NPWT (DeRoyal) and Rancho or Kerlix. Current dressings being used:       Mid Back Wound: NO TAPE ON SKIN DISTAL TO WOUND, TAPE ONTO HYDROCOLLOID ONLY!! Acetic Acid (white vinegar 5%) to wound, let sit 1-2 min as needed for green drainage     Hydrocolloid to cover fragile area distal of wound  Calmoseptine to alin-wound  Apply Flagyl crushed to wound as needed for malodor  Cover wound with Silver Alginate    Cover with dry dressing (4x4's, ABD), leave hydrocolloid in place to protect skin  Change 3 times per week per [facility]     Coccyx:  Hydrocolloid, Leave in place for the week.       Left Ischial wound:  Calmoseptine to alin-wound & any reddened areas  Silver Alginate to wound   Cover with dry dressing  Change 3 times per week [facility]     Right Ischial wound: HOLD NPWT this week starting 6/19/19  Acetic acid (white vinegar 5%) to wound, let sit 1 min if needed for green drainage  Calmoseptine to alin-wound & any reddened areas to protect  Apply Flagyl crushed to wound as needed for malodor  Triad into distal depth of wound   Silver Alginate to wound   May use Drawtex if needed for excessive drainage  Cover with 4x4 fluff gauze & ABD  Change 3 times per week [facility] Right Knee, Right BKA amp sites:  Mariana to wounds  Cover with 4x4's, kerlix  Single layer F spandagrip to help hold dressing in place  Leave in place for the week. Use a pillow or towel under your stump to help keep knee from flexing! Left lower leg wounds:  Calmoseptine to wound & alin-wound  Cover with non-adherent foam  Keep clean, dry & intact for the week. Left heel wound:  Vashe spray to wound, let sit 1-2 minutes, no need to rinse  Calmoseptine to alin-wound  Mariana to wound  4x4's, ABD to heel  Football dressing (4 cast pad, kerlix, coban-no compression) -- wrap cast padding high enough to cover leg ulcer also. Keep clean, dry & intact for the week. 6. Offloading --    Pressure ulcer offloading: educated patient on importance of turning and repositioning at least every 2 hours, protective heel boots, pillows / wedges, ROHO-type cushion and Group II mattress TBA . Neuropathic ulcer offloading: Football dressing to left heel . 7.  Adjunctive therapies --     Date       Wnd # Location  CTP Rx HBOT  NPWT     8 Right Ischial    Started 4/17/19    27 Thoracic   Stopped 11/28/19    47 & 48 Right BKA stump sites   Start 3/27/19  Stop 4/17/19

## 2019-06-24 NOTE — PROGRESS NOTES
fatigued, NAD  Pale, no thrush, resolving drug rash on his arms, no new areas of cutaneous Candidiasis  Mild LE edema, no cellulitis, angitis, fluctuance, no acute arthritis or bursitis  Chetna-ulcer skin: generally pink, warm and indurated, mild hyperkeratosis at left lower leg, fewer adhesive tape changes at T-spine; unfortunately the NPWT was placed incorrectly by [facility] again, so there are several rectangular areas of erythema, moisture, mild denudation from where the foam was placed on his skin (not at all cut to the shape of the wound, not tucked into depth, without drape between foam and skin). Ulcer(s): T-spine smaller, red, granular, fibrin and biofilm; left ischium deep red, granular, a bit of fibrin and biofilm; right ischium with the main part of the wound slowly smaller, red, hypergranular, fibrin and biofilm, and the incised area along the thigh with some red tissue, some SQ fibronecrosis; left leg tiny, superficial, red, clean; left heel smaller, red, soft, hypergranular, clean; right knee very slowly smaller, red, soft, hypergranular; right BK stump deep red, granular, smaller, minimal fibrin, serous exudate. Photos also saved in electronic chart.     Today's wound measurements:  Wound 10/17/14 #8, right ischium, stage 4 PU, onset 7/15/14 (merged with #30), pressure-Wound Length (cm): 11.5 cm  Wound 04/10/19 #49, Right Knee Anterior, Trauma, Partial Thickness, (onset 2018), gradually appeared-Wound Length (cm): 0.9 cm  Wound 04/10/19 # 50, Right Medial Knee, Venous, Partial Thickness, (onset 2018) Gradually appeared-Wound Length (cm): 2.4 cm  Wound 02/06/19 #47, Right BKA amp site-Lateral, dehisced wound, full thickness, onset 2/5/19, gradually appeared-Wound Length (cm): 1 cm  Wound 02/06/19 #48, Right BKA amp. site-Medial, Dehisced Wound, full thickness, onset 2/5/19, gradually appeared-Wound Length (cm): 0.6 cm  Wound 01/23/19 #45, Left Ischium, Pressure, Stage 4, onset 1/16/19, Pressure G45.9       Assessment of today's active condition(s): DM, paraplegia, multiple nonhealing wounds related mostly to pressure, prior infections, prior surgeries. 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 Left lower extremity doing well, left ischium should heal soon with continued offloading, right BK stump slowly making progress, right knee still a little stubborn, T-spine wound care still palliative right now; right ischial and thigh ulcer has potential to heal up now that the thigh tunnel was incised and unroofed, though that did lead to another episode of bleeding requiring a trip back to the ER; unfortunately we need to pause NPWT again, because of improper placement by home-care, which damaged his skin. Factors contributing to occurrence and/or persistence of the chronic ulcer include lymphedema, diabetes, decreased mobility, shear force and obesity. Medical necessity of today's visit is shown by the above documentation. Sharp debridement is indicated today, based upon the exam findings in the wound(s) above. Procedure note:     Consent obtained. Time out performed per [facility] policy. Anesthetic  Anesthetic: 4% Lidocaine Liquid Topical     Using a curette, I sharply debrided the back (midline) and left ischium ulcer(s) down through and including the removal of dermis. The type(s) of tissue debrided included fibrin, biofilm and exudate. Total Surface Area Debrided: 8 sq cm. Using a curette, I sharply debrided the right ischium ulcer(s) down through and including the removal of subcutaneous tissue. The type(s) of tissue debrided included fibrin, biofilm and necrotic/eschar. Total Surface Area Debrided: approx 20 sq cm. The ulcers were then irrigated with normal saline solution. The procedure was completed with a moderate amount of bleeding, and hemostasis was with pressure and with silver nitrate stick(s).  The patient (vashe comfeel [facility] Hold NPWT for this week; I'll speak to [facility] about proper NPWT technique. No additional Abx at this time. Keep up good work with offloading, protein intake, glucose control. Tentative plans for [facility] for ongoing left heel offloading once healed; I'll check on retail purchase this week. Home treatment: good handwashing before and after any dressing changes. Cleanse wound with saline or soap & water before dressing change. May use Vaseline (petrolatum), Aquaphor, Aveeno, CeraVe, Cetaphil, Eucerin, Lubriderm, etc for dry skin. Dressing type for home: as above for the T-spine and ischia, three times weekly. Written discharge instructions given to patient. Follow up in 1 week.     Electronically signed by Charisma Allen MD on 6/24/2019 at 2:31 PM.

## 2019-06-26 ENCOUNTER — HOSPITAL ENCOUNTER (OUTPATIENT)
Dept: WOUND CARE | Age: 54
Discharge: HOME | End: 2019-06-26
Payer: MEDICARE

## 2019-06-26 VITALS
SYSTOLIC BLOOD PRESSURE: 90 MMHG | HEART RATE: 65 BPM | RESPIRATION RATE: 16 BRPM | TEMPERATURE: 97.6 F | DIASTOLIC BLOOD PRESSURE: 56 MMHG

## 2019-06-26 DIAGNOSIS — L89.214: ICD-10-CM

## 2019-06-26 LAB
GLUCOSE BLD-MCNC: 50 MG/DL (ref 70–99)
GLUCOSE BLD-MCNC: 62 MG/DL (ref 70–99)
GLUCOSE BLD-MCNC: 64 MG/DL (ref 70–99)
PERFORMED ON: ABNORMAL

## 2019-06-26 PROCEDURE — 97605 NEG PRS WND THER DME<=50SQCM: CPT

## 2019-06-26 PROCEDURE — 11042 DBRDMT SUBQ TIS 1ST 20SQCM/<: CPT

## 2019-06-26 PROCEDURE — 97605 NEG PRS WND THER DME<=50SQCM: CPT | Performed by: INTERNAL MEDICINE

## 2019-06-26 PROCEDURE — 17250 CHEM CAUT OF GRANLTJ TISSUE: CPT

## 2019-06-26 PROCEDURE — 97597 DBRDMT OPN WND 1ST 20 CM/<: CPT

## 2019-06-26 PROCEDURE — 17250 CHEM CAUT OF GRANLTJ TISSUE: CPT | Performed by: INTERNAL MEDICINE

## 2019-06-26 PROCEDURE — 11042 DBRDMT SUBQ TIS 1ST 20SQCM/<: CPT | Performed by: INTERNAL MEDICINE

## 2019-06-26 PROCEDURE — 97597 DBRDMT OPN WND 1ST 20 CM/<: CPT | Performed by: INTERNAL MEDICINE

## 2019-06-26 RX ORDER — FLUCONAZOLE 100 MG/1
100 TABLET ORAL DAILY
Qty: 7 TABLET | Refills: 0 | Status: SHIPPED | OUTPATIENT
Start: 2019-06-26 | End: 2019-07-03

## 2019-06-26 RX ORDER — LIDOCAINE HYDROCHLORIDE 40 MG/ML
SOLUTION TOPICAL ONCE
Status: DISCONTINUED | OUTPATIENT
Start: 2019-06-26 | End: 2019-06-27 | Stop reason: HOSPADM

## 2019-06-26 ASSESSMENT — PAIN DESCRIPTION - FREQUENCY: FREQUENCY: CONTINUOUS

## 2019-06-26 ASSESSMENT — PAIN - FUNCTIONAL ASSESSMENT: PAIN_FUNCTIONAL_ASSESSMENT: ACTIVITIES ARE NOT PREVENTED

## 2019-06-26 ASSESSMENT — PAIN DESCRIPTION - ORIENTATION: ORIENTATION: LEFT

## 2019-06-26 ASSESSMENT — PAIN DESCRIPTION - PAIN TYPE: TYPE: CHRONIC PAIN

## 2019-06-26 ASSESSMENT — PAIN DESCRIPTION - ONSET: ONSET: ON-GOING

## 2019-06-26 ASSESSMENT — PAIN SCALES - GENERAL: PAINLEVEL_OUTOF10: 3

## 2019-06-26 ASSESSMENT — PAIN DESCRIPTION - PROGRESSION: CLINICAL_PROGRESSION: NOT CHANGED

## 2019-06-26 ASSESSMENT — PAIN DESCRIPTION - LOCATION: LOCATION: BACK;SHOULDER

## 2019-06-26 ASSESSMENT — PAIN DESCRIPTION - DESCRIPTORS: DESCRIPTORS: BURNING;STABBING

## 2019-06-27 PROBLEM — L89.214: Status: ACTIVE | Noted: 2019-06-27

## 2019-06-27 PROBLEM — L89.893 PRESSURE ULCER OF LEFT LEG, STAGE 3 (HCC): Chronic | Status: RESOLVED | Noted: 2018-01-07 | Resolved: 2019-06-27

## 2019-06-27 NOTE — PROGRESS NOTES
[facility] Progress Note    Rachel Schaefer     : 1965    DATE OF VISIT:  2019    Subjective:     Rachel Schaefer is a 48 y.o. male who has a pressure ulcer located on the left and right ischium and foot (left , heel). Significant symptoms or pertinent wound history since last visit: feeling well overall, no F/C/D, no SOB or CP, eating well. Staying in bed the majority of the time -- he tells me he's only been out of bed this week to come see me, and to get a shower -- wounds continue to improve because of his dedication to this. He does note that the home-care nurses told him that there was increased drainage from the right ischial wound this week, and increased redness around the pelvic ulcers. Additional ulcer(s) noted? yes. Right ischium has actually  into ischial and thigh ulcers; left leg healed; T-spine, right knee and right BK stump ulcers still open. His current medication list consists of Apixaban, Insulin Lispro, aspirin, blood glucose test strips, cyclobenzaprine, ferrous sulfate, insulin glargine, loratadine, magnesium oxide, metFORMIN, metoclopramide, metoprolol succinate, metroNIDAZOLE (topical PRN), nitroGLYCERIN, nystatin, oxyCODONE, pantoprazole, polyethylene glycol, promethazine, rosuvastatin, senna, torsemide, traZODone HCl, and triamcinolone. Allergies: Benadryl [diphenhydramine hcl]; Statins [statins]; Cephalexin; Morphine; Penicillins; and Sulfa antibiotics    Objective:     Vitals:    19 1115   BP: (!) 90/56  Comment: asymptomatic   Pulse: 65   Resp: 16   Temp: 97.6 °F (36.4 °C)   TempSrc: Oral     AAOx3, overweight, fatigued, NAD  Intact left pedal pulses, foot warm, good cap refill  Generally mild LE edema  No cellulitis, angitis, fluctuance  No acute arthritis or bursitis  Still a patch of irritation and denudation near the T-spine ulcer, from adhesive tape.   A large area of patchy erythema with some satellite macules and papules around the buttocks -- I think a combination of heat, drainage and moisture, maybe Candidiasis, probably excessive dressing adhesive. Chetna-ulcer skin: generally pink and indurated, with the exceptions noted above, and at the left heel there's a bit of flaking hyperkeratosis and slight purple change from thinly covered hypergranulation tissue. Ulcer(s): T-spine still getting smaller - superiorly only the screw is exposed, midportion there's a bit of hypergranulation around that screw, and distally there's still a small tunnel running toward probably a 3rd exposed screw; left ischium small, deep red, granular, a bit of loose fibrin; right ischium now actually  into 2 areas - actual ischial component is smaller, red, hypergranular, fibrin and biofilm, and the posterior thigh component is mostly granular, a bit of SQ necrosis, fibrin, minimal residual deep tunneling; right knee cluster very stagnant, a bit hypergranular and fleshy in parts, otherwise not unhealthy appearing, just open and not epithelialized; right BK stump deep red, finely granular, fibrinous necrosis, but slowly getting smaller, no biofilm; left leg delicately healed again; left heel much smaller this week, red, smooth, healthy, a bit of flaking hyperkeratosis at the edges. Photos also saved in electronic chart.     Today's wound measurements:  Wound 10/18/17 #28 Left heel, Pressure, Stage 4, onset 10/11/17, pressure-Wound Length (cm): 3 cm  Wound 04/10/19 # 50, Right Medial Knee, Venous, Partial Thickness, (onset 2018) Gradually appeared-Wound Length (cm): 2.1 cm  Wound 04/10/19 #49, Right Knee Anterior, Trauma, Partial Thickness, (onset 2018), gradually appeared-Wound Length (cm): 1 cm  Wound 02/06/19 #48, Right BKA amp. site-Medial, Dehisced Wound, full thickness, onset 2/5/19, gradually appeared-Wound Length (cm): 0.5 cm  Wound 02/06/19 #47, Right BKA amp site-Lateral, dehisced wound, full thickness, onset 2/5/19, gradually appeared-Wound Length (cm): 1.2 cm  Wound 12/06/18 #44 Left lateral lower leg, Pressure, Stage 3 (onset 12/4/18) Pressure-Wound Length (cm): 0 cm  Wound 08/16/17 #27- Thoracic spine, dehisced surgical wound, full thickness, onset 8/16/2017, pressure-Wound Length (cm): 7 cm  Wound 10/17/14 #8, right ischium, stage 4 PU, onset 7/15/14 (merged with #30), pressure-Wound Length (cm): 11.5 cm  Wound 01/23/19 #45, Left Ischium, Pressure, Stage 4, onset 1/16/19, Pressure -Wound Length (cm): 1 cm    Wound 10/18/17 #28 Left heel, Pressure, Stage 4, onset 10/11/17, pressure-Wound Width (cm): 1.2 cm  Wound 04/10/19 # 50, Right Medial Knee, Venous, Partial Thickness, (onset 2018) Gradually appeared-Wound Width (cm): 1.5 cm  Wound 04/10/19 #49, Right Knee Anterior, Trauma, Partial Thickness, (onset 2018), gradually appeared-Wound Width (cm): 1.2 cm  Wound 02/06/19 #48, Right BKA amp. site-Medial, Dehisced Wound, full thickness, onset 2/5/19, gradually appeared-Wound Width (cm): 1.7 cm  Wound 02/06/19 #47, Right BKA amp site-Lateral, dehisced wound, full thickness, onset 2/5/19, gradually appeared-Wound Width (cm): 2.3 cm  Wound 12/06/18 #44 Left lateral lower leg, Pressure, Stage 3 (onset 12/4/18) Pressure-Wound Width (cm): 0 cm  Wound 08/16/17 #27- Thoracic spine, dehisced surgical wound, full thickness, onset 8/16/2017, pressure-Wound Width (cm): 1 cm  Wound 10/17/14 #8, right ischium, stage 4 PU, onset 7/15/14 (merged with #30), pressure-Wound Width (cm): 2.2 cm  Wound 01/23/19 #45, Left Ischium, Pressure, Stage 4, onset 1/16/19, Pressure -Wound Width (cm): 0.5 cm    Wound 10/18/17 #28 Left heel, Pressure, Stage 4, onset 10/11/17, pressure-Wound Depth (cm): 0.1 cm  Wound 04/10/19 # 50, Right Medial Knee, Venous, Partial Thickness, (onset 2018) Gradually appeared-Wound Depth (cm): 0.1 cm  Wound 04/10/19 #49, Right Knee Anterior, Trauma, Partial Thickness, (onset 2018), gradually appeared-Wound Depth (cm): 0.1 cm  Wound 02/06/19 #48, Right BKA amp. site-Medial, Dehisced Wound, full thickness, onset 2/5/19, gradually appeared-Wound Depth (cm): 0.1 cm  Wound 02/06/19 #47, Right BKA amp site-Lateral, dehisced wound, full thickness, onset 2/5/19, gradually appeared-Wound Depth (cm): 0.1 cm  Wound 12/06/18 #44 Left lateral lower leg, Pressure, Stage 3 (onset 12/4/18) Pressure-Wound Depth (cm): 0 cm  Wound 08/16/17 #27- Thoracic spine, dehisced surgical wound, full thickness, onset 8/16/2017, pressure-Wound Depth (cm): 0.6 cm  Wound 10/17/14 #8, right ischium, stage 4 PU, onset 7/15/14 (merged with #30), pressure-Wound Depth (cm): 2 cm  Wound 01/23/19 #45, Left Ischium, Pressure, Stage 4, onset 1/16/19, Pressure -Wound Depth (cm): 0.3 cm     (In reality, the right ischium is approx 4 x 2 x 0.1 cm, and the now-separate right posterior thigh is approx 6 x 2 x 2 cm). 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 31XA    Pressure ulcer of left ischium, stage 4 ([facility]) L89.324    Traumatic ulcer of lower extremity, right, limited to breakdown of skin ([facility] [address] L97.911    TIA (transient ischemic attack) G45.9    Pressure ulcer of right upper thigh, stage 4 ([facility]) L89.214       Assessment of today's active condition(s): DM, paraplegia, multiple nonhealing wounds related mostly to pressure, prior infections, prior surgeries; nearly everything is better this week than the last 1-2 weeks, except the knee is stagnant, and the left ischium is maybe stalled for a week; no ischemia, no signs of active infection (except presumably deep in the T-spine wound); buttock rash probably multifactorial; right ischial wound is distinctly enough  into two separate areas that we can treat them individually at this point. Factors contributing to occurrence and/or persistence of the chronic ulcer include lymphedema, diabetes, chronic pressure, decreased mobility and shear force. Medical necessity of today's visit is shown by the above documentation. Sharp debridement is indicated today, based upon the exam findings in the wound(s) above. Procedure note:     Consent obtained. Time out performed per [facility] policy. Anesthetic  Anesthetic: 4% Lidocaine Liquid Topical     Using a curette, I sharply debrided the right ischium and right lower leg ulcer(s) down through and including the removal of dermis. The type(s) of tissue debrided included fibrin and biofilm. Total Surface Area Debrided: 8 sq cm. Using a curette, I sharply debrided the right thigh ulcer(s) down through and including the removal of subcutaneous tissue. The type(s) of tissue debrided included fibrin, slough and necrotic/eschar. Total Surface Area Debrided: 10 sq cm. The ulcers were then irrigated with normal saline solution. The procedure was completed with a moderate amount of bleeding, and hemostasis was with pressure and silver nitrate sticks.  The patient tolerated the procedure well, with no significant complications. The patient's level of pain during and after the procedure was monitored. Post-debridement measurements, if different from pre-debridement, are in the flowsheet as well.  _______________    To encourage better epithelial cell coverage, I did use AgNO3 to chemically cauterize hypergranulation tissue on the T-spine and right knee ulcer(s), after application of 4% lidocaine topical solution. This was tolerated well, with no pain or skin injury. Discharge plan:     Treatment in the wound care center today: Wound 08/16/17 #27- Thoracic spine, dehisced surgical wound, full thickness, onset 8/16/2017, pressure-Dressing/Treatment: Other (comment)(Hydrocolloid,HCLO,Calmoseptine,OpticelAg,Mepilex Border)  Wound 10/17/14 #8, right ischium, stage 4 PU, onset 7/15/14 (merged with #30), pressure-Dressing/Treatment: periwound Riley, HClO, silver alginate, dry dressing. T-spine -- HClO, silver alginate, dry dressing, plus hydrocolloid to area of tape denudation. Right thigh -- HClO, foam, NPWT bridged toward hip, 150 mmHg continuous. Left ischium -- HClO, Riley, silver alginate, dry dressing. Right knee an right BKA stump -- Multidex, Adaptic, several layers of cast padding, Kerlix, Tubigrip (modified football dressing). Left leg -- calmoseptine, foam  Left heel -- Multidex, Adaptic, dry dressing, football dressing (extended proximally to cover the lower leg ulcer too). The anticipation is that the lower extremity dressings will stay in place for the week. Keep up great work with offloading, minimal chair time, frequent repositioning in bed. Keep up with protein intake and glucose control. I called in a week of Diflucan for the buttock rash. Since the left heel is nearly healed, we need to start thinking about long-term offloading.  I think for at least the next few months, function needs to supercede convenience, and since he'll still be spending a good deal of time in bed to help the ischial ulcers to heal, I think the [facility] boot is the best option. I don't believe these are routinely covered by insurance, but in my medical opinion, it is definitely a necessity. That foot has already been through multiple episodes of osteomyelitis, multiple wounds and multiple surgeries for largely pressure-related ulcers, and he's already lost the contralateral leg due to complications from pressure ulceration. Will contact the [place] Medicaid Waiver program to see about coverage. Home treatment: good handwashing before and after any dressing changes. Cleanse wound with saline or soap & water before dressing change. May use Vaseline (petrolatum), Aquaphor, Aveeno, CeraVe, Cetaphil, Eucerin, Lubriderm, etc for dry skin. Dressing type for home: as above for the right ischium, right thigh, left ischium and T-spine, three times weekly. It is very important that (a) NPWT foam be cut to the size of the thigh wound, not just laid over top in a large rectangle, and (b) the foam used to bridge the NPWT away from the wound not be placed directly on his skin, but on additional drape first. Written discharge instructions given to patient. Follow up in 1 week.     Electronically signed by Vika Brewer MD on 6/27/2019 at 3:03 PM.

## 2019-06-27 NOTE — PLAN OF CARE
[facility] Summary     1. General --     Active wound etiologies:                     pressure (stage 4). PCP and pertinent consultants: Shun Corea MD                                                               [facility] for power chair & off-loading cushions for chair                                                              DeRoyal NPWT     Other plans for overall health:                             [facility]:                        provided by home care company     Most recent [id number] [facility] lab results:           Lab Results   Component Value Date     LABA1C 6.5 05/15/2019            Lab Results   Component Value Date     LABALBU 3.5 06/13/2019            Lab Results   Component Value Date     CREATININE 1.5 (H) 06/13/2019            Lab Results   Component Value Date     HGB 9.6 (L) 06/13/2019         2. Circulatory status, if lower extremity ulcer --     Most recent RAMAN (none since 4/2016):                        No data recorded  No data recorded  Most recent arterial imaging:              None since 4/2016     Most recent arterial Rx:                      None     Current management of edema:        periodic leg elevation, diuretics (Torsemide) and static compression (tubigrip or spandagrip ). Most recent venous imaging:              None in 2018 or 2019     3. Debridement --     Debridement methods, past or present:         Sharp. Pertinent surgical history for wound(s):            Right BKA 1/15/19  Bone debridement of left heel per Dr Alma Rosa Mg 4/2019  Right Ischial wound-tunnel unroofed per Dr Alma Rosa Mg 6/12/19     4.          Infection --      Recent culture results:            4//10/19 Left heel bone Culture     WOUND/ABSCESS 04/10/2019  1:10 PM [facility] Lab   Gram Stain Result Abnormal  04/10/2019  1:10 PM [address] [place] [address] Lab   4+ RBC's   1+ WBC's (Polymorphonuclear)   3+ Gram positive cocci    Organism Abnormal  04/10/2019  1:10 PM [address] Lab   Proteus mirabilis    WOUND/ABSCESS 04/10/2019  1:10 PM [address] Lab   Rare growth    Organism Abnormal  04/10/2019  1:10 PM [address] Lab   Enterococcus faecalis    WOUND/ABSCESS 04/10/2019  1:10 PM [address] Lab   Light growth    Organism Abnormal  04/10/2019  1:10 PM [address] Lab   Staph aureus MRSA    WOUND/ABSCESS Abnormal  04/10/2019  1:10 PM [address] Lab   Light growth          Recent imaging:                      No imaging of current wounds in the last year. Recent antibiotic Rx:               4/2019 Vancomycin & Invanz for left heel cultures & osteo      5. Topical therapies --     Previous dressings on current wound(s):       Hypochlorous acid spray, Flagyl powder, Betadine, Triamcinolone (for dermatitis or hypergranulation PRN), Adaptic, Xeroform, Procellera, kept moist, Hydrogel, Triad dressing, Mariana, AMD gauze packing, Iodoform packing, Silver alginate (moisten if wound becomes dry), DrawTex, Foam, Hydrocolloid, Superabsorbent, NPWT (DeRoyal) and Rancho or Kerlix. Current dressings being used:                            Mid Back Wound: NO TAPE ON SKIN DISTAL TO WOUND, TAPE ONTO HYDROCOLLOID ONLY!!   Acetic Acid (white vinegar 5%) to wound, let sit 1-2 min as needed for green drainage     Hydrocolloid to cover fragile area distal & lateral of wound  Calmoseptine to alin-wound  Apply Flagyl crushed to wound as needed for malodor  Cover wound with Silver Alginate    Cover with dry dressing (4x4's, ABD), leave hydrocolloid in place to protect skin  Change 3 times per week per [facility]     Left Ischial wound:  Calmoseptine to alin-wound & any reddened areas  Silver Alginate to wound   Cover with smaller mepilex border  Change 3 times per week [facility]     Right Ischial wound:     SUPERIOR AREA:  Acetic acid (white vinegar 5%) to wound, let sit 1 min if needed for green drainage  Calmoseptine to la nena-wound & any reddened areas to protect  Apply Flagyl crushed to wound as needed for malodor  Silver Alginate to wound   May use Drawtex if needed for excessive drainage  Cover with larger mepilex border  Change 3 times per week [facility]    DISTAL AREA:  DRAPE ALL LA NENA-WOUND, NO FOAM TOUCHING SKIN  Small thin strip of foam into wound  Bridge to side of hip  NPWT at -150mmHg continuous  HHC to change on Saturday & Monday     Right Knee, Right BKA amp sites:  Multidex then adaptic to wounds  Cover with 4x4's  3 rolls of cast padding, kerlix to cover stump & knee wounds  Single layer G spandagrip to help hold dressing in place  Leave in place for the week. Use a pillow or towel under your stump to help keep knee from flexing! Left lower leg wounds:  Calmoseptine to wound & la nena-wound  Cover with non-adherent foam  Include this wound under the football dressing for left heel, see below  Keep clean, dry & intact for the week. Left heel wound:  Vashe spray to wound, let sit 1-2 minutes, no need to rinse  Calmoseptine to la nena-wound  Mariana to wound  4x4's, ABD to heel  Football dressing (4 cast pad, kerlix, coban-no compression) -- wrap cast padding high enough to cover leg ulcer also. Keep clean, dry & intact for the week. 6.         Offloading --     Pressure ulcer offloading:       educated patient on importance of turning and repositioning at least every 2 hours, protective heel boots, pillows / wedges, ROHO-type cushion and Group II mattress TBA . Neuropathic ulcer offloading:  Football dressing to left heel & off-loading boot.      7.         Adjunctive therapies --      Date           Wnd #        Location                      CTP Rx           HBOT              NPWT       8 Right Ischial      Started 4/17/19     27 Thoracic     Stopped 11/28/19     47 & 48 Right BKA stump sites     Start 3/27/19  Stop 4/17/19

## 2019-06-28 NOTE — PROGRESS NOTES
Patient is here for scheduled wound care visit. During application of dressings, he reports feeling dizzy. BS checked, = 50. He drinks 4 oz of orange juice and eats 2 rick crackers. 1220:  Re-checked blood sugar, = 64. He eats 2 more rick crackers, he reports feeling better. 1235:  Rechecked blood sugar = 62. He drinks 4 oz of orange juice and eats 2 more rick crackers. He reports feeling better at this time. This nurse leaves room, handing off care to other staff members. Requested that they repeat BS prior to patient discharge.

## 2019-07-02 ENCOUNTER — TELEPHONE (OUTPATIENT)
Dept: INTERNAL MEDICINE CLINIC | Age: 54
End: 2019-07-02

## 2019-07-02 RX ORDER — METOPROLOL SUCCINATE 100 MG/1
TABLET, EXTENDED RELEASE ORAL
Qty: 90 TABLET | Refills: 0 | Status: SHIPPED | OUTPATIENT
Start: 2019-07-02 | End: 2019-08-30 | Stop reason: SDUPTHER

## 2019-07-03 ENCOUNTER — HOSPITAL ENCOUNTER (OUTPATIENT)
Dept: WOUND CARE | Age: 54
Discharge: HOME | End: 2019-07-03
Payer: MEDICARE

## 2019-07-03 VITALS
HEIGHT: 74 IN | WEIGHT: 230.1 LBS | HEART RATE: 62 BPM | RESPIRATION RATE: 16 BRPM | DIASTOLIC BLOOD PRESSURE: 47 MMHG | SYSTOLIC BLOOD PRESSURE: 85 MMHG | BODY MASS INDEX: 29.53 KG/M2 | TEMPERATURE: 96.9 F

## 2019-07-03 DIAGNOSIS — L97.911 DIABETIC ULCER OF RIGHT LOWER LEG ASSOCIATED WITH TYPE 2 DIABETES MELLITUS, LIMITED TO BREAKDOWN OF SKIN (HCC): ICD-10-CM

## 2019-07-03 DIAGNOSIS — E11.622 DIABETIC ULCER OF RIGHT LOWER LEG ASSOCIATED WITH TYPE 2 DIABETES MELLITUS, LIMITED TO BREAKDOWN OF SKIN (HCC): ICD-10-CM

## 2019-07-03 PROCEDURE — 17250 CHEM CAUT OF GRANLTJ TISSUE: CPT | Performed by: INTERNAL MEDICINE

## 2019-07-03 PROCEDURE — 11042 DBRDMT SUBQ TIS 1ST 20SQCM/<: CPT

## 2019-07-03 PROCEDURE — 97597 DBRDMT OPN WND 1ST 20 CM/<: CPT | Performed by: INTERNAL MEDICINE

## 2019-07-03 PROCEDURE — 11042 DBRDMT SUBQ TIS 1ST 20SQCM/<: CPT | Performed by: INTERNAL MEDICINE

## 2019-07-03 PROCEDURE — 97597 DBRDMT OPN WND 1ST 20 CM/<: CPT

## 2019-07-03 PROCEDURE — 97605 NEG PRS WND THER DME<=50SQCM: CPT | Performed by: INTERNAL MEDICINE

## 2019-07-03 PROCEDURE — 97605 NEG PRS WND THER DME<=50SQCM: CPT

## 2019-07-03 PROCEDURE — 17250 CHEM CAUT OF GRANLTJ TISSUE: CPT

## 2019-07-03 RX ORDER — LIDOCAINE 40 MG/G
CREAM TOPICAL ONCE
Status: DISCONTINUED | OUTPATIENT
Start: 2019-07-03 | End: 2019-07-04 | Stop reason: HOSPADM

## 2019-07-03 RX ORDER — FLUCONAZOLE 200 MG/1
200 TABLET ORAL DAILY
Qty: 7 TABLET | Refills: 2 | Status: SHIPPED
Start: 2019-07-03 | End: 2019-07-16 | Stop reason: ALTCHOICE

## 2019-07-03 ASSESSMENT — PAIN SCALES - GENERAL: PAINLEVEL_OUTOF10: 0

## 2019-07-03 NOTE — PLAN OF CARE
stump sites     Start 3/27/19  Stop 4/17/19        Wounds overall stable, debridement & Ag Nitrate as per MD. Will change Right knee, stump & left heel wounds to using xeroform. Add Triamcinolone to back again. Otherwise cont. With current wound care regime. Pt. Cont. To off-load as ordered. F/u in [facility] in 1 week as ordered. Discharge instructions reviewed with patient, all questions answered, copy given to patient. Dressings were applied to all wounds per M.D. Instructions at this visit.

## 2019-07-08 PROBLEM — T81.31XA SURGICAL WOUND DEHISCENCE, INITIAL ENCOUNTER: Status: RESOLVED | Noted: 2019-02-07 | Resolved: 2019-07-08

## 2019-07-08 PROBLEM — L97.911 ULCER OF RIGHT LOWER LEG, LIMITED TO BREAKDOWN OF SKIN (HCC): Status: RESOLVED | Noted: 2019-07-08 | Resolved: 2019-07-08

## 2019-07-08 PROBLEM — L97.911 ULCER OF RIGHT LOWER LEG, LIMITED TO BREAKDOWN OF SKIN (HCC): Status: ACTIVE | Noted: 2019-07-08

## 2019-07-08 PROBLEM — E11.622 DIABETIC ULCER OF RIGHT LOWER LEG ASSOCIATED WITH TYPE 2 DIABETES MELLITUS, LIMITED TO BREAKDOWN OF SKIN (HCC): Status: ACTIVE | Noted: 2019-07-08

## 2019-07-08 PROBLEM — L97.911 DIABETIC ULCER OF RIGHT LOWER LEG ASSOCIATED WITH TYPE 2 DIABETES MELLITUS, LIMITED TO BREAKDOWN OF SKIN (HCC): Status: ACTIVE | Noted: 2019-07-08

## 2019-07-08 RX ORDER — TORSEMIDE 20 MG/1
TABLET ORAL
Qty: 90 TABLET | Refills: 0 | Status: SHIPPED | OUTPATIENT
Start: 2019-07-08 | End: 2019-08-23 | Stop reason: SDUPTHER

## 2019-07-08 NOTE — PROGRESS NOTES
[facility] Progress Note    Dusty Ramesh     : 1965    DATE OF VISIT:  7/3/2019    Subjective:     Dusty Ramesh is a 48 y.o. male who has a pressure ulcer located on the left and right ischium, right thigh and foot (left , heel). Significant symptoms or pertinent wound history since last visit: feeling ok overall, no F/C/D, no SOB or CP. Doing well with offloading; only out of bed for brief periods to get showered and come to MD appointments. Still some increased right ischial-thigh drainage this week, but skin changes are calming down. Additional ulcer(s) noted? yes. T-spine surgical, right knee traumatic, right BK stump (diabetic). Left lower leg does seem more durably healed now. His current medication list consists of Apixaban, aspirin, blood glucose test strips, cyclobenzaprine, ferrous sulfate, fluconazole, insulin glargine, insulin lispro, loratadine, magnesium oxide, metFORMIN, metoclopramide, metoprolol succinate, metroNIDAZOLE, nitroGLYCERIN, nystatin, oxyCODONE, pantoprazole, polyethylene glycol, promethazine, rosuvastatin, senna, torsemide, traZODone HCl, and triamcinolone. Allergies: Benadryl [diphenhydramine hcl]; Statins [statins]; Cephalexin; Morphine; Penicillins; and Sulfa antibiotics    Objective:     Vitals:    19 1038   BP: (!) 85/47  Comment: pt asymptomatic   Pulse: 62   Resp: 16   Temp: 96.9 °F (36.1 °C)   TempSrc: Oral   Weight: 230 lb 1.6 oz (104.4 kg)   Height: 6' 2\" (1.88 m)     AAOx3, overweight, fatigued, NAD  No icterus, stable pallor, no abd tenderness  Mild LE edema, no cellulitis, angitis, fluctuance  No acute arthritis or bursitis  Chetna-ulcer skin: generally pink and indurated; less adhesive damage at T-spine; less prominent Candidiasis at right ischium; one new small area of denudation at the perineum (from pressure - drainage - Candida - dressings?).   Ulcer(s): T-spine smaller, red, hypergranular, clean, 2-3 screws center today: Wound 10/17/14 #8, right ischium, stage 4 PU, onset 7/15/14 (merged with #30), pressure-Dressing/Treatment: (calmoseptine silver alginate drawtex mepilex border)  Wound 07/03/19 #51, Right Upper Thigh, Pressure Injury, Stage 4, Onset 7/15/14-Dressing/Treatment: Vacuum dressing  Wound 04/10/19 #49, Right Knee Anterior, DWLE, Mccauley 1, (onset 2018), gradually appeared-Dressing/Treatment: (xeroform 4x4's ABD 3 cast padding, kerlix spandagrip)  Wound 04/10/19 # 50, Right Medial Knee, DWLE, Mcca[place]y 1, (onset 2018) Gradually appeared-Dressing/Treatment: (xeroform 4x4's ABD 3 cast padding, kerlix spandagrip)  Wound 02/06/19 #47, Right BKA amp site-Lateral, dehisced wound, full thickness, onset 2/5/19, gradually appeared-Dressing/Treatment: (xeroform 4x4's ABD 3 cast padding, kerlix spandagrip)  Wound 02/06/19 #48, Right BKA amp. site-Medial, Dehisced Wound, full thickness, onset 2/5/19, gradually appeared-Dressing/Treatment: (xeroform 4x4's ABD 3 cast padding, kerlix spandagrip)  Wound 01/23/19 #45, Left Ischium, Pressure, Stage 4, onset 1/16/19, Pressure -Dressing/Treatment: (calmoseptine opticel ag mepilex border)  Wound 10/18/17 #28 Left heel, Pressure, Stage 4, onset 10/11/17, pressure-Dressing/Treatment: (vashe xeroform 4x4's ABD 4 cast padding, kerlix coban)  Wound 08/16/17 #27- Thoracic spine, dehisced surgical wound, full thickness, onset 8/16/2017, pressure-Dressing/Treatment: (hydrocolloid calmoseptine triamcinolone silver alg 4x4's ABD). Right and left lower extremity dressings to stay in place for the week. Keep up good work with glucose control, protein intake, offloading (minimal chair use). Will check on insurance authorization of a cellular-tissue product for the right lower leg, perhaps Oasis (in reviewing their medical coverage policies to see what products are covered for what indications). Home treatment: good handwashing before and after any dressing changes.  Cleanse wound with

## 2019-07-10 ENCOUNTER — HOSPITAL ENCOUNTER (OUTPATIENT)
Dept: WOUND CARE | Age: 54
Discharge: HOME | End: 2019-07-10
Payer: MEDICARE

## 2019-07-10 VITALS
SYSTOLIC BLOOD PRESSURE: 79 MMHG | BODY MASS INDEX: 29.52 KG/M2 | HEIGHT: 74 IN | WEIGHT: 230 LBS | DIASTOLIC BLOOD PRESSURE: 53 MMHG | RESPIRATION RATE: 16 BRPM | TEMPERATURE: 97.8 F | HEART RATE: 63 BPM

## 2019-07-10 DIAGNOSIS — L97.911 ULCER OF RIGHT LOWER LEG, LIMITED TO BREAKDOWN OF SKIN (HCC): ICD-10-CM

## 2019-07-10 PROCEDURE — 97605 NEG PRS WND THER DME<=50SQCM: CPT | Performed by: INTERNAL MEDICINE

## 2019-07-10 PROCEDURE — 15271 SKIN SUB GRAFT TRNK/ARM/LEG: CPT | Performed by: INTERNAL MEDICINE

## 2019-07-10 PROCEDURE — 97597 DBRDMT OPN WND 1ST 20 CM/<: CPT | Performed by: INTERNAL MEDICINE

## 2019-07-10 PROCEDURE — 97597 DBRDMT OPN WND 1ST 20 CM/<: CPT

## 2019-07-10 PROCEDURE — 11042 DBRDMT SUBQ TIS 1ST 20SQCM/<: CPT | Performed by: INTERNAL MEDICINE

## 2019-07-10 PROCEDURE — C5271 LOW COST SKIN SUBSTITUTE APP: HCPCS

## 2019-07-10 PROCEDURE — 17250 CHEM CAUT OF GRANLTJ TISSUE: CPT

## 2019-07-10 PROCEDURE — 17250 CHEM CAUT OF GRANLTJ TISSUE: CPT | Performed by: INTERNAL MEDICINE

## 2019-07-10 PROCEDURE — 11042 DBRDMT SUBQ TIS 1ST 20SQCM/<: CPT

## 2019-07-10 PROCEDURE — 97605 NEG PRS WND THER DME<=50SQCM: CPT

## 2019-07-10 RX ORDER — LIDOCAINE 40 MG/G
CREAM TOPICAL ONCE
Status: DISCONTINUED | OUTPATIENT
Start: 2019-07-10 | End: 2019-07-11 | Stop reason: HOSPADM

## 2019-07-10 ASSESSMENT — PAIN SCALES - GENERAL: PAINLEVEL_OUTOF10: 0

## 2019-07-10 NOTE — PLAN OF CARE
1850 [facility] Summary     1. General --     Active wound etiologies:                     pressure (stage 4). PCP and pertinent consultants: Vaughn Patel MD                                                               [facility] for power chair & off-loading cushions for chair                                                              DeRoyal NPWT     Other plans for overall health:                             [facility]:                        provided by home care company     Most recent [facility] lab results:               Lab Results   Component Value Date     LABA1C 6.5 05/15/2019                Lab Results   Component Value Date     LABALBU 3.5 06/13/2019                Lab Results   Component Value Date     CREATININE 1.5 (H) 06/13/2019                Lab Results   Component Value Date     HGB 9.6 (L) 06/13/2019         2. Circulatory status, if lower extremity ulcer --     Most recent RAMAN (none since 4/2016):                        No data recorded  No data recorded  Most recent arterial imaging:              None since 4/2016     Most recent arterial Rx:                      None     Current management of edema:        periodic leg elevation, diuretics (Torsemide) and static compression (tubigrip or spandagrip ). Most recent venous imaging:              None in 2018 or 2019     3. Debridement --     Debridement methods, past or present:         Sharp. Pertinent surgical history for wound(s):            Right BKA 1/15/19  Bone debridement of left heel per Dr Alli Quach 4/2019  Right Ischial wound-tunnel unroofed per Dr Alli Quach 6/12/19     4.          Infection --      Recent culture results:            4//10/19 Left heel bone Culture     WOUND/ABSCESS 04/10/2019  1:10 PM [facility]   Gram Stain Result Abnormal  04/10/2019  1:10 PM [facility] 27 Thoracic     Stopped 11/28/19     47 & 48 Right BKA stump sites     Start 3/27/19  Stop 4/17/19   7/10/19         #47       Right lateral stump site       Oasis #1 applied      Additional specific goal for this week: Patient will receive safe and proper application of Oasis Wound Matrix. Patient will comply with caring for wound and reporting any concerns.  guidelines for application followed. Expiration date of Oasis checked immediately prior to use. Package intact prior to use, with no damage noted. Product storage at ambient temperature, per  guideline. Product lot #: [id number]  Expiration date: 08/03/2020   Saline lot #: [id number]  Expiration date: 12/2020  Patient/family/caregiver instructed to keep dressing clean, dry and intact. Patient/family/caregiver instructed on signs and symptoms to report, such as draining through dressing, dressing slipping or falling down, becoming wet, or patient having severe pain, severe itching, severe malodor, fever, etc.     Date of first application of a cellular / tissue product for this current wound is July 10, 2019. Today was application # 1 for this wound. Wounds overall stable, debridement & Ag Nitrate as per MD. Cont. With current wound care regime. Pt. Cont. To off-load as ordered. F/u in [address] [place] [address],[address] in 1 week as ordered. Discharge instructions reviewed with patient, all questions answered, copy given to patient. Dressings were applied to all wounds per M.D. Instructions at this visit.

## 2019-07-15 DIAGNOSIS — K21.9 GASTROESOPHAGEAL REFLUX DISEASE WITHOUT ESOPHAGITIS: ICD-10-CM

## 2019-07-15 RX ORDER — PANTOPRAZOLE SODIUM 40 MG/1
TABLET, DELAYED RELEASE ORAL
Qty: 90 TABLET | Refills: 0 | Status: SHIPPED | OUTPATIENT
Start: 2019-07-15 | End: 2019-08-30 | Stop reason: SDUPTHER

## 2019-07-16 NOTE — PROGRESS NOTES
site-Medial, Dehisced Wound, full thickness, onset 2/5/19, gradually appeared-Wound Width (cm): 0.3 cm  Wound 10/18/17 #28 Left heel, Pressure, Stage 4, onset 10/11/17, pressure-Wound Width (cm): 2.5 cm  Wound 01/23/19 #45, Left Ischium, Pressure, Stage 4, onset 1/16/19, Pressure -Wound Width (cm): 0.3 cm  Wound 08/16/17 #27- Thoracic spine, dehisced surgical wound, full thickness, onset 8/16/2017, pressure-Wound Width (cm): 1 cm  Wound 10/17/14 #8, right ischium, stage 4 PU, onset 7/15/14 (merged with #30), pressure-Wound Width (cm): 1.5 cm  Wound 07/03/19 #51, Right Upper Thigh, Pressure Injury, Stage 4, Onset 7/15/14-Wound Width (cm): 1 cm    Wound 04/10/19 #49, Right Knee Anterior, GUANACO Mccauley 1, (onset 2018), gradually appeared-Wound Depth (cm): 0.1 cm  Wound 04/10/19 # 50, Right Medial Knee, GUANACO [place] 1, (onset 2018) Gradually appeared-Wound Depth (cm): 0.1 cm  Wound 02/06/19 #47, Right BKA amp site-Lateral, dehisced wound, full thickness, onset 2/5/19, gradually appeared-Wound Depth (cm): 0.1 cm  Wound 02/06/19 #48, Right BKA amp. site-Medial, Dehisced Wound, full thickness, onset 2/5/19, gradually appeared-Wound Depth (cm): 0.1 cm  Wound 10/18/17 #28 Left heel, Pressure, Stage 4, onset 10/11/17, pressure-Wound Depth (cm): 0.1 cm  Wound 01/23/19 #45, Left Ischium, Pressure, Stage 4, onset 1/16/19, Pressure -Wound Depth (cm): 0.2 cm  Wound 08/16/17 #27- Thoracic spine, dehisced surgical wound, full thickness, onset 8/16/2017, pressure-Wound Depth (cm): 0.7 cm  Wound 10/17/14 #8, right ischium, stage 4 PU, onset 7/15/14 (merged with #30), pressure-Wound Depth (cm): 0.1 cm  Wound 07/03/19 #51, Right Upper Thigh, Pressure Injury, Stage 4, Onset 7/15/14-Wound Depth (cm): 1.9 cm    Assessment:     Patient Active Problem List   Diagnosis Code    Mixed hyperlipidemia E78.2    Coronary artery disease involving native coronary artery of native heart without angina pectoris I25.10    Paraplegia, complete ([facility] [address] applying Skin-Prep and Calmoseptine to the alin-wound skin, a 21 sqcm piece of Oasis Wound Matrix was cut to fit the ulcer in a double layer to increase exposure of product to the wound, placed into the ulcer bed, affixed to the surrounding skin with Steri-Strips, moistened with saline and covered with Xeroform. Fifteen sqcm of the product was used in the ulcer, and six sqcm of the product was discarded. If applicable, any wastage is due to the fact the smallest available product was larger than what was needed for his ulcer(s). The patient tolerated the procedure well, without complications. Discharge plan:     Treatment in the wound care center today: Wound 04/10/19 #49, Right Knee Anterior, Garrick BLANC 1, (onset 2018), gradually appeared-Dressing/Treatment: (calmoseptine xeroform 4x4's ABD cast padding kerlix tubigrip)  Wound 04/10/19 # 50, Right Medial Knee, Garrick BLANC 1, (onset 2018) Gradually appeared-Dressing/Treatment: (calmoseptine xeroform 4x4's ABD cast padding kerlix tubigrip)  Wound 02/06/19 #47, Right BKA amp site-Lateral, dehisced wound, full thickness, onset 2/5/19, gradually appeared-Dressing/Treatment: (calmoseptine 4x4's ABD cast padding kerlix tubigrip)  Wound 02/06/19 #48, Right BKA amp. site-Medial, Dehisced Wound, full thickness, onset 2/5/19, gradually appeared-Dressing/Treatment: (calmoseptine 4x4's ABD cast padding kerlix tubigrip)  Wound 10/18/17 #28 Left heel, Pressure, Stage 4, onset 10/11/17, pressure-Dressing/Treatment: (vashe calmoseptine xeroform 4x4's ABD football )  Wound 10/17/14 #8, right ischium, stage 4 PU, onset 7/15/14 (merged with #30), pressure-Dressing/Treatment: (calmoseptine flagyl silver alginate drawtex mepilex border)  Wound 07/03/19 #51, Right Upper Thigh, Pressure Injury, Stage 4, Onset 7/15/14-Dressing/Treatment: Vacuum dressing. Left ischium dressed with silver alginate, gauze bolster, small Mepilex border affixed with benzoin.      Left foot, right knee,

## 2019-07-17 ENCOUNTER — HOSPITAL ENCOUNTER (OUTPATIENT)
Dept: WOUND CARE | Age: 54
Discharge: HOME | End: 2019-07-17
Payer: MEDICARE

## 2019-07-17 VITALS
RESPIRATION RATE: 18 BRPM | DIASTOLIC BLOOD PRESSURE: 59 MMHG | TEMPERATURE: 96.8 F | HEART RATE: 67 BPM | SYSTOLIC BLOOD PRESSURE: 96 MMHG

## 2019-07-17 PROCEDURE — 17250 CHEM CAUT OF GRANLTJ TISSUE: CPT | Performed by: INTERNAL MEDICINE

## 2019-07-17 PROCEDURE — 11720 DEBRIDE NAIL 1-5: CPT

## 2019-07-17 PROCEDURE — 97605 NEG PRS WND THER DME<=50SQCM: CPT | Performed by: INTERNAL MEDICINE

## 2019-07-17 PROCEDURE — 97597 DBRDMT OPN WND 1ST 20 CM/<: CPT

## 2019-07-17 PROCEDURE — 17250 CHEM CAUT OF GRANLTJ TISSUE: CPT

## 2019-07-17 PROCEDURE — 11042 DBRDMT SUBQ TIS 1ST 20SQCM/<: CPT | Performed by: INTERNAL MEDICINE

## 2019-07-17 PROCEDURE — 11720 DEBRIDE NAIL 1-5: CPT | Performed by: INTERNAL MEDICINE

## 2019-07-17 PROCEDURE — 97605 NEG PRS WND THER DME<=50SQCM: CPT

## 2019-07-17 PROCEDURE — 97597 DBRDMT OPN WND 1ST 20 CM/<: CPT | Performed by: INTERNAL MEDICINE

## 2019-07-17 PROCEDURE — 11042 DBRDMT SUBQ TIS 1ST 20SQCM/<: CPT

## 2019-07-17 RX ORDER — LIDOCAINE HYDROCHLORIDE 40 MG/ML
SOLUTION TOPICAL ONCE
Status: DISCONTINUED | OUTPATIENT
Start: 2019-07-17 | End: 2019-07-18 | Stop reason: HOSPADM

## 2019-07-17 ASSESSMENT — PAIN DESCRIPTION - LOCATION: LOCATION: SHOULDER;NECK

## 2019-07-17 ASSESSMENT — PAIN DESCRIPTION - FREQUENCY: FREQUENCY: CONTINUOUS

## 2019-07-17 ASSESSMENT — PAIN DESCRIPTION - ONSET: ONSET: ON-GOING

## 2019-07-17 ASSESSMENT — PAIN SCALES - GENERAL: PAINLEVEL_OUTOF10: 3

## 2019-07-17 ASSESSMENT — PAIN DESCRIPTION - PAIN TYPE: TYPE: CHRONIC PAIN

## 2019-07-17 ASSESSMENT — PAIN DESCRIPTION - DESCRIPTORS: DESCRIPTORS: BURNING;STABBING

## 2019-07-17 ASSESSMENT — PAIN - FUNCTIONAL ASSESSMENT: PAIN_FUNCTIONAL_ASSESSMENT: ACTIVITIES ARE NOT PREVENTED

## 2019-07-17 ASSESSMENT — PAIN DESCRIPTION - PROGRESSION: CLINICAL_PROGRESSION: NOT CHANGED

## 2019-07-17 ASSESSMENT — PAIN DESCRIPTION - ORIENTATION: ORIENTATION: LEFT

## 2019-07-24 ENCOUNTER — HOSPITAL ENCOUNTER (OUTPATIENT)
Dept: WOUND CARE | Age: 54
Discharge: HOME | End: 2019-07-24
Payer: MEDICARE

## 2019-07-24 VITALS
HEIGHT: 74 IN | BODY MASS INDEX: 29.52 KG/M2 | DIASTOLIC BLOOD PRESSURE: 64 MMHG | RESPIRATION RATE: 18 BRPM | HEART RATE: 67 BPM | SYSTOLIC BLOOD PRESSURE: 101 MMHG | WEIGHT: 230 LBS | TEMPERATURE: 97.1 F

## 2019-07-24 PROCEDURE — 97597 DBRDMT OPN WND 1ST 20 CM/<: CPT | Performed by: INTERNAL MEDICINE

## 2019-07-24 PROCEDURE — 97597 DBRDMT OPN WND 1ST 20 CM/<: CPT

## 2019-07-24 PROCEDURE — 17250 CHEM CAUT OF GRANLTJ TISSUE: CPT

## 2019-07-24 PROCEDURE — 97605 NEG PRS WND THER DME<=50SQCM: CPT

## 2019-07-24 PROCEDURE — 17250 CHEM CAUT OF GRANLTJ TISSUE: CPT | Performed by: INTERNAL MEDICINE

## 2019-07-24 RX ORDER — LIDOCAINE 40 MG/G
CREAM TOPICAL ONCE
Status: DISCONTINUED | OUTPATIENT
Start: 2019-07-24 | End: 2019-07-25 | Stop reason: HOSPADM

## 2019-07-24 ASSESSMENT — PAIN SCALES - GENERAL: PAINLEVEL_OUTOF10: 0

## 2019-07-24 NOTE — PROGRESS NOTES
88 [facility] Progress Note    Courtney Farnsworth     : 1965    DATE OF VISIT:  2019    Subjective:     Courtney Farnsworth is a 48 y.o. male who has a pressure ulcer located on the left and right ischium, right thigh and foot (left , heel). Significant symptoms or pertinent wound history since last visit: feeling ok overall, no F/C/D, no SOB or CP, stable wound drainage in general. Doing well with offloading. Will be meeting with new Medicaid waiver  this coming week. Additional ulcer(s) noted? yes. T-spine, right knee, right BKA stump (1 of 2 healed), and small ulcer on left lower leg reopened. His current medication list consists of Apixaban, aspirin, blood glucose test strips, cyclobenzaprine, ferrous sulfate, insulin glargine, insulin lispro, loratadine, magnesium oxide, metFORMIN, metoclopramide, metoprolol succinate, metroNIDAZOLE, nitroGLYCERIN, nystatin, oxyCODONE, pantoprazole, polyethylene glycol, promethazine, rosuvastatin, senna, torsemide, traZODone HCl, and triamcinolone. Allergies: Benadryl [diphenhydramine hcl]; Statins [statins]; Cephalexin; Morphine; Penicillins; and Sulfa antibiotics    Objective:     Vitals:    19 1033   BP: (!) 96/59   Pulse: 67   Resp: 18   Temp: 96.8 °F (36 °C)   TempSrc: Oral     AAOx3, overweight, fatigued, NAD  No cellulitis, angitis, fluctuance  Still some tape tears around mid-back and sacrum, buttocks  No definite active Candidiasis, no other dressing dermatitis  Mild-moderate BL LE edema, no acute arthritis  Remaining left toe nails thickened, elongated, with some subungal debris and yellow discoloration  Chetna-ulcer skin: generally pink and indurated, some erythema at right ischium and thigh, mild hyperkeratosis at left leg and heel.   Ulcer(s): T-spine red, hypergranular, slowly smaller, stable exposed hardware; right ischium smaller, red, granular, fibrin, biofilm; left ischium a bit larger this week, looks right ischium, stage 4 PU, onset 7/15/14 (merged with #30), pressure-Wound Width (cm): 0.8 cm  Wound 01/23/19 #45, Left Ischium, Pressure, Stage 4, onset 1/16/19, Pressure -Wound Width (cm): 1.1 cm  Wound 04/10/19 # 50, Right Medial Knee, Garrick BLANC 1, (onset 2018) Gradually appeared-Wound Width (cm): 1.9 cm  Wound 02/06/19 #47, Right BKA amp site-Lateral, dehisced wound, full thickness, onset 2/5/19, gradually appeared-Wound Width (cm): 0.9 cm  [REMOVED] Wound 02/06/19 #48, Right BKA amp. site-Medial, Dehisced Wound, full thickness, onset 2/5/19, gradually appeared-Wound Width (cm): 0 cm  Wound 10/18/17 #28 Left heel, Pressure, Stage 4, onset 10/11/17, pressure-Wound Width (cm): 2.4 cm    Wound 07/03/19 #51, Right Upper Thigh, Pressure Injury, Stage 4, Onset 7/15/14-Wound Depth (cm): 1.9 cm  Wound 04/10/19 #49, Right Knee Anterior, Garrick BLANC 1, (onset 2018), gradually appeared-Wound Depth (cm): 0.1 cm  Wound 08/16/17 #27- Thoracic spine, dehisced surgical wound, full thickness, onset 8/16/2017, pressure-Wound Depth (cm): 0.5 cm  Wound 10/17/14 #8, right ischium, stage 4 PU, onset 7/15/14 (merged with #30), pressure-Wound Depth (cm): 0.1 cm  Wound 01/23/19 #45, Left Ischium, Pressure, Stage 4, onset 1/16/19, Pressure -Wound Depth (cm): 0.4 cm  Wound 04/10/19 # 50, Right Medial Knee, Garrick BLANC 1, (onset 2018) Gradually appeared-Wound Depth (cm): 0.1 cm  Wound 02/06/19 #47, Right BKA amp site-Lateral, dehisced wound, full thickness, onset 2/5/19, gradually appeared-Wound Depth (cm): 0.1 cm  [REMOVED] Wound 02/06/19 #48, Right BKA amp. site-Medial, Dehisced Wound, full thickness, onset 2/5/19, gradually appeared-Wound Depth (cm): 0 cm  Wound 10/18/17 #28 Left heel, Pressure, Stage 4, onset 10/11/17, pressure-Wound Depth (cm): 0.1 cm    Assessment:     Patient Active Problem List   Diagnosis Code    Mixed hyperlipidemia E78.2    Coronary artery disease involving native coronary artery of native heart without

## 2019-07-24 NOTE — PLAN OF CARE
Hospital Lab   4+ RBC's   1+ WBC's (Polymorphonuclear)   3+ Gram positive cocci    Organism Abnormal  04/10/2019  1:10 PM [address] Lab   Proteus mirabilis    WOUND/ABSCESS 04/10/2019  1:10 PM [address] Lab   Rare growth    Organism Abnormal  04/10/2019  1:10 PM [address] Lab   Enterococcus faecalis    WOUND/ABSCESS 04/10/2019  1:10 PM [address] Lab   Light growth    Organism Abnormal  04/10/2019  1:10 PM [address] Lab   Staph aureus MRSA    WOUND/ABSCESS Abnormal  04/10/2019  1:10 PM [address] Lab   Light growth          Recent imaging:                      No imaging of current wounds in the last year. Recent antibiotic Rx:               4/2019 Vancomycin & Invanz for left heel cultures & osteo      5. Topical therapies --     Previous dressings on current wound(s):       Hypochlorous acid spray, Flagyl powder, Betadine, Triamcinolone (for dermatitis or hypergranulation PRN), Adaptic, Xeroform, Procellera, kept moist, Hydrogel, Triad dressing, Mariana, AMD gauze packing, Iodoform packing, Silver alginate (moisten if wound becomes dry), DrawTex, Foam, Hydrocolloid, Superabsorbent, NPWT (DeRoyal) and Rancho or Kerlix. Current dressings being used:                            Mid Back Wound: NO TAPE ON SKIN DISTAL TO WOUND, TAPE ONTO HYDROCOLLOID ONLY!!   Acetic Acid (white vinegar 5%) to wound, let sit 1-2 min as needed for green drainage     Hydrocolloid to cover fragile area distal & lateral of wound  Calmoseptine to alin-wound  Triamcinolone thin layer over anything open  Cover wound with Silver Alginate    Cover with dry dressing (4x4's, ABD), leave hydrocolloid in place to protect skin  Change 3 times per week per [facility]     Left Ischial wound:  Calmoseptine to alin-wound   Silver Alginate to wound   Cover with smaller mepilex border  Change 3 times per week per [facility]     Right Ischial wound:   Calmoseptine to alin-wound & any reddened areas to

## 2019-07-31 ENCOUNTER — HOSPITAL ENCOUNTER (OUTPATIENT)
Dept: WOUND CARE | Age: 54
Discharge: HOME | End: 2019-07-31
Payer: MEDICARE

## 2019-07-31 VITALS
RESPIRATION RATE: 18 BRPM | HEART RATE: 94 BPM | HEIGHT: 74 IN | BODY MASS INDEX: 29.13 KG/M2 | SYSTOLIC BLOOD PRESSURE: 96 MMHG | TEMPERATURE: 96.8 F | DIASTOLIC BLOOD PRESSURE: 61 MMHG | WEIGHT: 227 LBS

## 2019-07-31 PROCEDURE — 97597 DBRDMT OPN WND 1ST 20 CM/<: CPT

## 2019-07-31 PROCEDURE — 97597 DBRDMT OPN WND 1ST 20 CM/<: CPT | Performed by: INTERNAL MEDICINE

## 2019-07-31 PROCEDURE — 17250 CHEM CAUT OF GRANLTJ TISSUE: CPT

## 2019-07-31 PROCEDURE — 17250 CHEM CAUT OF GRANLTJ TISSUE: CPT | Performed by: INTERNAL MEDICINE

## 2019-07-31 RX ORDER — LIDOCAINE 40 MG/G
CREAM TOPICAL ONCE
Status: DISCONTINUED | OUTPATIENT
Start: 2019-07-31 | End: 2019-08-01 | Stop reason: HOSPADM

## 2019-07-31 ASSESSMENT — PAIN SCALES - GENERAL
PAINLEVEL_OUTOF10: 0
PAINLEVEL_OUTOF10: 0

## 2019-07-31 NOTE — PROGRESS NOTES
[facility] Progress Note    Raul Ackerman     : 1965    DATE OF VISIT:  2019    Subjective:     Raul Ackerman is a 48 y.o. male who has a pressure ulcer located on the right posterior thigh, left and right ischium and foot (left , heel). Significant symptoms or pertinent wound history since last visit: feeling well overall, no F/C/D, no CP or SOB, no N/V/D. Doing well with offloading. Additional ulcer(s) noted? Yes -- T-spine, right knee, right lower leg    His current medication list consists of Apixaban, aspirin, blood glucose test strips, cyclobenzaprine, ferrous sulfate, insulin glargine, insulin lispro, loratadine, magnesium oxide, metFORMIN, metoclopramide, metoprolol succinate, metroNIDAZOLE, nitroGLYCERIN, nystatin, oxyCODONE, pantoprazole, polyethylene glycol, promethazine, rosuvastatin, senna, torsemide, traZODone HCl, and triamcinolone. Allergies: Benadryl [diphenhydramine hcl]; Statins [statins]; Cephalexin; Morphine; Penicillins; and Sulfa antibiotics    Objective:     Vitals:    19 1028 19 1030   BP:  101/64   Pulse:  67   Resp: 18    Temp: 97.1 °F (36.2 °C)    TempSrc: Oral    Weight: 230 lb (104.3 kg)    Height: 6' 2\" (1.88 m)      AAOx3, overweight, fatigued, NAD  Intact left pedal pulses, foot warm, good cap refill  Right BK stump warm, good cap refill  Mild LE edema, no cellulitis, angitis, fluctuance  No acute arthritis or bursitis; perhaps mild residual Candidiasis at buttocks  Still some tape tears at mid-back and sacral-perineal area  [place]-ulcer skin: generally pink and indurated, a bit less inflamed at right knee, no VAC foam denudation this week, very slowly getting more substantial epidermal coverage around left heel ulcer.   Ulcer(s): T-spine small, red, hypergranular; left ischium smaller again, red, granular, clean; right ischium small, red, granular, fibrin, biofilm; right thigh a bit smaller, mostly granular, some fibrin and biofilm, minimal distal tunnel; right knee less inflamed and hypergranular, smaller overall; right lower leg small, pink, finely granular; left heel slowly smaller, red, hypergranular, no signs of infection. Photos also saved in electronic chart.     Today's wound measurements:  Wound 07/03/19 #51, Right Upper Thigh, Pressure Injury, Stage 4, Onset 7/15/14-Wound Length (cm): 5.6 cm  Wound 04/10/19 #49, Right Knee Anterior, Garrick BLANC 1, (onset 2018), gradually appeared-Wound Length (cm): 0.7 cm  Wound 04/10/19 # 50, Right Medial Knee, Garrick BLANC 1, (onset 2018) Gradually appeared-Wound Length (cm): 2 cm  Wound 02/06/19 #47, Right BKA amp site-Lateral, dehisced wound, full thickness, onset 2/5/19, gradually appeared-Wound Length (cm): 0.3 cm  Wound 10/18/17 #28 Left heel, Pressure, Stage 4, onset 10/11/17, pressure-Wound Length (cm): 3 cm  Wound 08/16/17 #27- Thoracic spine, dehisced surgical wound, full thickness, onset 8/16/2017, pressure-Wound Length (cm): 7.2 cm  Wound 10/17/14 #8, right ischium, stage 4 PU, onset 7/15/14 (merged with #30), pressure-Wound Length (cm): 1.2 cm  Wound 01/23/19 #45, Left Ischium, Pressure, Stage 4, onset 1/16/19, Pressure -Wound Length (cm): 2.2 cm    Wound 07/03/19 #51, Right Upper Thigh, Pressure Injury, Stage 4, Onset 7/15/14-Wound Width (cm): 1.7 cm  Wound 04/10/19 #49, Right Knee Anterior, Garrick BLANC 1, (onset 2018), gradually appeared-Wound Width (cm): 0.5 cm  Wound 04/10/19 # 50, Right Medial Knee, Garrick BLANC 1, (onset 2018) Gradually appeared-Wound Width (cm): 1.4 cm  Wound 02/06/19 #47, Right BKA amp site-Lateral, dehisced wound, full thickness, onset 2/5/19, gradually appeared-Wound Width (cm): 0.7 cm  Wound 10/18/17 #28 Left heel, Pressure, Stage 4, onset 10/11/17, pressure-Wound Width (cm): 1.5 cm  Wound 08/16/17 #27- Thoracic spine, dehisced surgical wound, full thickness, onset 8/16/2017, pressure-Wound Width (cm): 0.9 cm  Wound 10/17/14 #8, right ischium,

## 2019-08-02 ENCOUNTER — TELEPHONE (OUTPATIENT)
Dept: WOUND CARE | Age: 54
End: 2019-08-02

## 2019-08-06 NOTE — PROGRESS NOTES
At 1320, writer returned call to Andrew Granados with [employer], no answer, voicemail left for a return call. At 1543, writer again attempted to call Andrew Granados with [employer], again no answer.
J91.308    Traumatic ulcer of lower extremity, right, limited to breakdown of skin ([address] L97.911    TIA (transient ischemic attack) G45.9    Pressure ulcer of right upper thigh, stage 4 (HCC) L89.214    Diabetic ulcer of right lower leg associated with type 2 diabetes mellitus, limited to breakdown of skin ([address] E11.622, L97.911       Assessment of today's active condition(s): DM, paraplegia, multiple nonhealing wounds related to pressure, prior surgeries, prior infections. Overall doing well, but right thigh a bit stalled this week - will pause NPWT for a week, as a test, to see if that was inadvertently bolstering the wound open, if a bit too much foam was being placed. Apart from the back wound, for which care is palliative, other wounds have clearly continued to make good progress in the last couple of months, and for the ischial and thigh wounds, that is due, in part, to 3 weekly home-care visits a week, which I believe need to continue at this time. Factors contributing to occurrence and/or persistence of the chronic ulcer include edema, diabetes, chronic pressure, decreased mobility and shear force. Medical necessity of today's visit is shown by the above documentation. Sharp debridement is indicated today, based upon the exam findings in the wound(s) above. Procedure note:     Consent obtained. Time out performed per [facility] policy. Anesthetic  Anesthetic: 4% Lidocaine Liquid Topical     Using a curette, I sharply debrided the right thigh ulcer(s) down through and including the removal of dermis. The type(s) of tissue debrided included fibrin and necrotic/eschar. Total Surface Area Debrided: 8 sq cm. The ulcers were then irrigated with normal saline solution. The procedure was completed with a small amount of bleeding, and hemostasis was with pressure. The patient tolerated the procedure well, with no significant complications.  The patient's level of pain during and after the procedure was

## 2019-08-07 ENCOUNTER — HOSPITAL ENCOUNTER (OUTPATIENT)
Dept: WOUND CARE | Age: 54
Discharge: HOME | End: 2019-08-07
Payer: MEDICARE

## 2019-08-07 VITALS
HEART RATE: 63 BPM | SYSTOLIC BLOOD PRESSURE: 99 MMHG | BODY MASS INDEX: 29.13 KG/M2 | HEIGHT: 74 IN | DIASTOLIC BLOOD PRESSURE: 58 MMHG | TEMPERATURE: 97 F | WEIGHT: 227 LBS | RESPIRATION RATE: 18 BRPM

## 2019-08-07 PROCEDURE — 97597 DBRDMT OPN WND 1ST 20 CM/<: CPT | Performed by: INTERNAL MEDICINE

## 2019-08-07 PROCEDURE — 17250 CHEM CAUT OF GRANLTJ TISSUE: CPT

## 2019-08-07 PROCEDURE — 97597 DBRDMT OPN WND 1ST 20 CM/<: CPT

## 2019-08-07 PROCEDURE — 17250 CHEM CAUT OF GRANLTJ TISSUE: CPT | Performed by: INTERNAL MEDICINE

## 2019-08-07 RX ORDER — LIDOCAINE 40 MG/G
CREAM TOPICAL ONCE
Status: DISCONTINUED | OUTPATIENT
Start: 2019-08-07 | End: 2019-08-08 | Stop reason: HOSPADM

## 2019-08-07 ASSESSMENT — PAIN DESCRIPTION - ORIENTATION: ORIENTATION: LEFT

## 2019-08-07 ASSESSMENT — PAIN DESCRIPTION - FREQUENCY: FREQUENCY: CONTINUOUS

## 2019-08-07 ASSESSMENT — PAIN DESCRIPTION - DESCRIPTORS: DESCRIPTORS: BURNING;STABBING

## 2019-08-07 ASSESSMENT — PAIN DESCRIPTION - PAIN TYPE: TYPE: CHRONIC PAIN

## 2019-08-07 ASSESSMENT — PAIN DESCRIPTION - LOCATION: LOCATION: SHOULDER;NECK

## 2019-08-07 ASSESSMENT — PAIN SCALES - GENERAL: PAINLEVEL_OUTOF10: 3

## 2019-08-07 ASSESSMENT — PAIN - FUNCTIONAL ASSESSMENT: PAIN_FUNCTIONAL_ASSESSMENT: ACTIVITIES ARE NOT PREVENTED

## 2019-08-07 ASSESSMENT — PAIN DESCRIPTION - PROGRESSION: CLINICAL_PROGRESSION: NOT CHANGED

## 2019-08-07 ASSESSMENT — PAIN DESCRIPTION - ONSET: ONSET: ON-GOING

## 2019-08-08 NOTE — PLAN OF CARE
Hospital Lab   4+ RBC's   1+ WBC's (Polymorphonuclear)   3+ Gram positive cocci    Organism Abnormal  04/10/2019  1:10 PM [address] Lab   Proteus mirabilis    WOUND/ABSCESS 04/10/2019  1:10 PM [address] Lab   Rare growth    Organism Abnormal  04/10/2019  1:10 PM [address] Lab   Enterococcus faecalis    WOUND/ABSCESS 04/10/2019  1:10 PM [address] Lab   Light growth    Organism Abnormal  04/10/2019  1:10 PM [address] Lab   Staph aureus MRSA    WOUND/ABSCESS Abnormal  04/10/2019  1:10 PM [address] Lab   Light growth          Recent imaging:                      No imaging of current wounds in the last year. Recent antibiotic Rx:               4/2019 Vancomycin & Invanz for left heel cultures & osteo      5. Topical therapies --     Previous dressings on current wound(s):       Hypochlorous acid spray, Flagyl powder, Betadine, Triamcinolone (for dermatitis or hypergranulation PRN), Adaptic, Xeroform, Procellera, kept moist, Hydrogel, Triad dressing, Mariana, AMD gauze packing, Iodoform packing, Silver alginate (moisten if wound becomes dry), DrawTex, Foam, Hydrocolloid, Superabsorbent, NPWT (DeRoyal) and Rancho or Kerlix.       Current dressings being used:                             Mid Back Wound:   Acetic Acid (white vinegar 5%) to wound, let sit 1-2 min as needed for green drainage  Calmoseptine to alin-wound  Triamcinolone thin layer over Silver Nitrated areas  Cover wound with Silver Alginate    Cover with large mepilex border  Change 3 times per week per [facility]        Right & Left Ischial, Thigh wounds:   Flagyl crushed & sprinkled on wounds as needed for malodor  Calmoseptine to alin-wound & any reddened areas to protect  Silver Alginate to wound   Cover with larger mepilex border  Change 3 times per week [facility]     Right Thigh wound: STOP NPWT date 7/31/19     Right Knee wounds:  Triamcinolone to wounds & alin-wound  Xeroform to wounds  Cover with 4x4's  3 rolls of cast padding, kerlix to cover stump & knee wounds  Single layer F spandagrip to help hold dressing in place  Leave in place for the week. Use a pillow or towel under your stump to help keep knee from flexing! Right BKA amp site:  Xeroform to wounds  Cover with 4x4's  3 rolls of cast padding, kerlix to cover stump & knee wounds  Single layer F spandagrip to help hold dressing in place  Leave in place for the week. Use a pillow or towel under your stump to help keep knee from flexing! Left calf  wound:  Vashe spray to wound, let sit 1-2 minutes, no need to rinse  Calmoseptine to alin-wound  Mariana-moistened to wound   Cover with mepilex border  Keep clean, dry & intact for the week. Left heel wound:  Vashe spray to wound, let sit 1-2 minutes, no need to rinse  Calmoseptine to alin-wound  Xeroform to wound  4x4's, ABD to heel  Football dressing (4 cast pad, kerlix, coban-no compression)   Keep clean, dry & intact for the week. 6.         Offloading --     Pressure ulcer offloading:       educated patient on importance of turning and repositioning at least every 2 hours, protective heel boots, pillows / wedges, ROHO-type cushion and Group II mattress TBA . Neuropathic ulcer offloading:  Football dressing to left heel & off-loading boot. 7.         Adjunctive therapies --      Date           Wnd #        Location                      CTP Rx           HBOT              NPWT       8 Right Ischial      Started 4/17/19     27 Thoracic     Stopped 11/28/19     47 & 48 Right BKA stump sites     Start 3/27/19  Stop 4/17/19   7/10/19         #47       Right lateral stump site       Oasis #1 applied           Wounds overall cont. To show improvement. Back, right ischial wound debrided per MD. Left heel almost healed, Ag Nitrate used per MD. Will cont. With current wound care regime. Pt. Is cont.  To off-load as ordered with frequent repositioning from side to side & not sitting

## 2019-08-13 PROBLEM — L97.911 DIABETIC ULCER OF RIGHT LOWER LEG ASSOCIATED WITH TYPE 2 DIABETES MELLITUS, LIMITED TO BREAKDOWN OF SKIN (HCC): Status: RESOLVED | Noted: 2019-07-08 | Resolved: 2019-08-13

## 2019-08-13 PROBLEM — E11.622 DIABETIC ULCER OF RIGHT LOWER LEG ASSOCIATED WITH TYPE 2 DIABETES MELLITUS, LIMITED TO BREAKDOWN OF SKIN (HCC): Status: RESOLVED | Noted: 2019-07-08 | Resolved: 2019-08-13

## 2019-08-14 ENCOUNTER — HOSPITAL ENCOUNTER (OUTPATIENT)
Dept: WOUND CARE | Age: 54
Discharge: HOME | End: 2019-08-14
Payer: MEDICARE

## 2019-08-14 VITALS
WEIGHT: 227 LBS | BODY MASS INDEX: 29.13 KG/M2 | HEIGHT: 74 IN | TEMPERATURE: 97.3 F | DIASTOLIC BLOOD PRESSURE: 65 MMHG | SYSTOLIC BLOOD PRESSURE: 80 MMHG | HEART RATE: 60 BPM | RESPIRATION RATE: 18 BRPM

## 2019-08-14 PROCEDURE — 17250 CHEM CAUT OF GRANLTJ TISSUE: CPT | Performed by: INTERNAL MEDICINE

## 2019-08-14 PROCEDURE — 17250 CHEM CAUT OF GRANLTJ TISSUE: CPT

## 2019-08-14 ASSESSMENT — PAIN DESCRIPTION - ORIENTATION: ORIENTATION: LEFT

## 2019-08-14 ASSESSMENT — PAIN DESCRIPTION - FREQUENCY: FREQUENCY: CONTINUOUS

## 2019-08-14 ASSESSMENT — PAIN DESCRIPTION - PAIN TYPE: TYPE: CHRONIC PAIN

## 2019-08-14 ASSESSMENT — PAIN SCALES - GENERAL: PAINLEVEL_OUTOF10: 3

## 2019-08-14 ASSESSMENT — PAIN DESCRIPTION - ONSET: ONSET: ON-GOING

## 2019-08-14 ASSESSMENT — PAIN DESCRIPTION - LOCATION: LOCATION: SHOULDER;NECK

## 2019-08-14 ASSESSMENT — PAIN - FUNCTIONAL ASSESSMENT: PAIN_FUNCTIONAL_ASSESSMENT: ACTIVITIES ARE NOT PREVENTED

## 2019-08-14 ASSESSMENT — PAIN DESCRIPTION - DESCRIPTORS: DESCRIPTORS: BURNING;STABBING

## 2019-08-14 ASSESSMENT — PAIN DESCRIPTION - PROGRESSION: CLINICAL_PROGRESSION: NOT CHANGED

## 2019-08-14 NOTE — PLAN OF CARE
[facility] Summary     1. General --     Active wound etiologies:                     pressure (stage 4). PCP and pertinent consultants: Mirian Robles MD                                                               [facility] for power chair & off-loading cushions for chair                                                              DeRoyal NPWT     Other plans for overall health:                             [facility]:                        provided by home care company     Most recent [address] lab results:               Lab Results   Component Value Date     LABA1C 6.5 05/15/2019                Lab Results   Component Value Date     LABALBU 3.5 06/13/2019                Lab Results   Component Value Date     CREATININE 1.5 (H) 06/13/2019                Lab Results   Component Value Date     HGB 9.6 (L) 06/13/2019         2. Circulatory status, if lower extremity ulcer --     Most recent RAMAN (none since 4/2016):                        No data recorded  No data recorded  Most recent arterial imaging:              None since 4/2016     Most recent arterial Rx:                      None     Current management of edema:        periodic leg elevation, diuretics (Torsemide) and static compression (tubigrip or spandagrip ). Most recent venous imaging:              None in 2018 or 2019     3. Debridement --     Debridement methods, past or present:         Sharp. Pertinent surgical history for wound(s):            Right BKA 1/15/19  Bone debridement of left heel per Dr Rosa Mathis 4/2019  Right Ischial wound-tunnel unroofed per Dr Rosa Mathis 6/12/19     4.          Infection --      Recent culture results:            4//10/19 Left heel bone Culture     WOUND/ABSCESS 04/10/2019  1:10 PM [address] [facility]   Gram Stain Result Abnormal  04/10/2019  1:10 PM [facility] Record stump & knee wounds  Single layer F spandagrip to help hold dressing in place  Leave in place for the week. Use a pillow or towel under your stump to help keep knee from flexing! Right BKA amp site:  Xeroform to wounds  Cover with 4x4's  3 rolls of cast padding, kerlix to cover stump & knee wounds  Single layer F spandagrip to help hold dressing in place  Leave in place for the week. Use a pillow or towel under your stump to help keep knee from flexing! Left calf  wound:  Vashe spray to wound, let sit 1-2 minutes, no need to rinse  Calmoseptine to alin-wound  Mariana-moistened to wound   Cover with mepilex border  Keep clean, dry & intact for the week. Left heel wound:  Xeroform to wound  4x4's, ABD to heel  Football dressing (4 cast pad, kerlix, coban-no compression)   Keep clean, dry & intact for the week. 6.         Offloading --     Pressure ulcer offloading:       educated patient on importance of turning and repositioning at least every 2 hours, protective heel boots, pillows / wedges, ROHO-type cushion and Group II mattress TBA . Neuropathic ulcer offloading:  Football dressing to left heel & off-loading boot. 7.         Adjunctive therapies --      Date           Wnd #        Location                      CTP Rx           HBOT              NPWT       8 Right Ischial      Started 4/17/19     27 Thoracic     Stopped 11/28/19     47 & 48 Right BKA stump sites     Start 3/27/19  Stop 4/17/19   7/10/19         #47       Right lateral stump site       Oasis #1 applied           Left heel wound healed, will cont. With football dressing for off-loading. Wounds overall cont. To show improvement. No debridement today. Right lateral stump wound larger today & knee wounds with more irritation this week. Pt. To make sure to be diligent about using a pillow to pad between knees, when lying on his side. Will otherwise cont. With current wound care regime. Pt. Is cont.  To off-load as ordered with frequent repositioning from side to side & not sitting in chair. F/u in [address] [place] [address] in 1 week as ordered. Discharge instructions reviewed with patient, all questions answered, copy given to patient. Dressings were applied to all wounds per M.D. Instructions at this visit.

## 2019-08-19 PROBLEM — L89.624 PRESSURE ULCER OF LEFT HEEL, STAGE 4 (HCC): Status: RESOLVED | Noted: 2018-05-29 | Resolved: 2019-08-19

## 2019-08-19 NOTE — PROGRESS NOTES
hypergranular, clean, stable hardware exposure; right ischium smaller, red, hypergranular, clean; left ischium nearly healed, red, granular; right thigh mostly granular, smaller, mildly inflamed, serous exudate; knee cluster more inflamed this week, a bit more open, hypergranular; one BK stump ulcer superficially reopened again too; left heel healed; left lower leg granular, a bit smaller, modest fibrin. Photos also saved in electronic chart.     Today's Wound Measurements:  Wound 08/07/19 #53, Left Lateral Leg, Pressure Injury, Stage 2, Onset 8/1/19-Wound Length (cm): 1 cm  Wound 10/18/17 #28 Left heel, Pressure, Stage 4, onset 10/11/17, pressure-Wound Length (cm): 0 cm  Wound 04/10/19 # 50, Right Medial Knee, Garrick BLANC 1, (onset 2018) Gradually appeared-Wound Length (cm): 1 cm  Wound 04/10/19 #49, Right Knee Anterior, Garrick BLANC 1, (onset 2018), gradually appeared-Wound Length (cm): 0.5 cm  Wound 02/06/19 #47, Right BKA amp site-Lateral, dehisced wound, full thickness, onset 2/5/19, gradually appeared-Wound Length (cm): 1.5 cm  Wound 01/23/19 #45, Left Ischium, Pressure, Stage 4, onset 1/16/19, Pressure -Wound Length (cm): 1.1 cm  Wound 08/16/17 #27- Thoracic spine, dehisced surgical wound, full thickness, onset 8/16/2017, pressure-Wound Length (cm): 7 cm  Wound 07/03/19 #51, Right Upper Thigh, Pressure Injury, Stage 4, Onset 7/15/14-Wound Length (cm): 5.3 cm  Wound 10/17/14 #8, right ischium, stage 4 PU, onset 7/15/14 (merged with #30), pressure-Wound Length (cm): 2 cm    Wound 08/07/19 #53, Left Lateral Leg, Pressure Injury, Stage 2, Onset 8/1/19-Wound Width (cm): 0.3 cm  Wound 10/18/17 #28 Left heel, Pressure, Stage 4, onset 10/11/17, pressure-Wound Width (cm): 0 cm  Wound 04/10/19 # 50, Right Medial Knee, Garrick BLANC 1, (onset 2018) Gradually appeared-Wound Width (cm): 0.6 cm  Wound 04/10/19 #49, Right Knee Anterior, Garrick BLANC 1, (onset 2018), gradually appeared-Wound Width (cm): 0.3 cm  Wound 02/06/19

## 2019-08-21 ENCOUNTER — HOSPITAL ENCOUNTER (OUTPATIENT)
Dept: WOUND CARE | Age: 54
Discharge: HOME | End: 2019-08-21
Payer: MEDICARE

## 2019-08-21 VITALS — BODY MASS INDEX: 29.39 KG/M2 | WEIGHT: 229 LBS | TEMPERATURE: 97.5 F | RESPIRATION RATE: 18 BRPM | HEIGHT: 74 IN

## 2019-08-21 PROCEDURE — 29581 APPL MULTLAYER CMPRN SYS LEG: CPT | Performed by: INTERNAL MEDICINE

## 2019-08-21 PROCEDURE — 97597 DBRDMT OPN WND 1ST 20 CM/<: CPT | Performed by: INTERNAL MEDICINE

## 2019-08-21 PROCEDURE — 17250 CHEM CAUT OF GRANLTJ TISSUE: CPT | Performed by: INTERNAL MEDICINE

## 2019-08-21 PROCEDURE — 17250 CHEM CAUT OF GRANLTJ TISSUE: CPT

## 2019-08-21 PROCEDURE — 97597 DBRDMT OPN WND 1ST 20 CM/<: CPT

## 2019-08-21 RX ORDER — LIDOCAINE 40 MG/G
CREAM TOPICAL ONCE
Status: DISCONTINUED | OUTPATIENT
Start: 2019-08-21 | End: 2019-08-22 | Stop reason: HOSPADM

## 2019-08-21 ASSESSMENT — PAIN DESCRIPTION - ORIENTATION: ORIENTATION: LEFT

## 2019-08-21 ASSESSMENT — PAIN DESCRIPTION - ONSET: ONSET: ON-GOING

## 2019-08-21 ASSESSMENT — PAIN DESCRIPTION - PAIN TYPE: TYPE: CHRONIC PAIN

## 2019-08-21 ASSESSMENT — PAIN DESCRIPTION - PROGRESSION: CLINICAL_PROGRESSION: NOT CHANGED

## 2019-08-21 ASSESSMENT — PAIN DESCRIPTION - FREQUENCY: FREQUENCY: CONTINUOUS

## 2019-08-21 ASSESSMENT — PAIN - FUNCTIONAL ASSESSMENT: PAIN_FUNCTIONAL_ASSESSMENT: PREVENTS OR INTERFERES SOME ACTIVE ACTIVITIES AND ADLS

## 2019-08-21 ASSESSMENT — PAIN SCALES - GENERAL: PAINLEVEL_OUTOF10: 3

## 2019-08-21 ASSESSMENT — PAIN DESCRIPTION - LOCATION: LOCATION: NECK;SHOULDER

## 2019-08-21 ASSESSMENT — PAIN DESCRIPTION - DESCRIPTORS: DESCRIPTORS: BURNING;STABBING

## 2019-08-21 NOTE — PLAN OF CARE
[facility] Summary     1. General --     Active wound etiologies:                     pressure (stage 4). PCP and pertinent consultants: Britney Silver MD                                                               [facility] for power chair & off-loading cushions for chair                                                                 Other plans for overall health:                             [facility]:                        provided by home care company     Most recent [facility] lab results:               Lab Results   Component Value Date     LABA1C 6.5 05/15/2019                Lab Results   Component Value Date     LABALBU 3.5 06/13/2019                Lab Results   Component Value Date     CREATININE 1.5 (H) 06/13/2019                Lab Results   Component Value Date     HGB 9.6 (L) 06/13/2019         2. Circulatory status, if lower extremity ulcer --     Most recent RAMAN (none since 4/2016):                        No data recorded  No data recorded  Most recent arterial imaging:              None since 4/2016     Most recent arterial Rx:                      None     Current management of edema:        periodic leg elevation, diuretics (Torsemide) and static compression (tubigrip or spandagrip ). Most recent venous imaging:              None in 2018 or 2019     3. Debridement --     Debridement methods, past or present:         Sharp. Pertinent surgical history for wound(s):            Right BKA 1/15/19  Bone debridement of left heel per Dr Anali Fall 4/2019  Right Ischial wound-tunnel unroofed per Dr Anali Fall 6/12/19     4.          Infection --      Recent culture results:            4//10/19 Left heel bone Culture     WOUND/ABSCESS 04/10/2019  1:10 PM [facility] Lab   Gram Stain Result Abnormal  04/10/2019  1:10 PM [address] [place] [address] Lab   4+

## 2019-08-23 RX ORDER — DOCUSATE SODIUM 100 MG
CAPSULE ORAL
Qty: 60 CAPSULE | Refills: 0 | Status: SHIPPED | OUTPATIENT
Start: 2019-08-23 | End: 2019-09-25 | Stop reason: SDUPTHER

## 2019-08-23 RX ORDER — TORSEMIDE 20 MG/1
TABLET ORAL
Qty: 90 TABLET | Refills: 0 | Status: SHIPPED | OUTPATIENT
Start: 2019-08-23 | End: 2019-08-30 | Stop reason: SDUPTHER

## 2019-08-26 PROBLEM — T81.31XA SURGICAL WOUND DEHISCENCE, INITIAL ENCOUNTER: Status: RESOLVED | Noted: 2019-02-07 | Resolved: 2019-08-26

## 2019-08-28 ENCOUNTER — HOSPITAL ENCOUNTER (OUTPATIENT)
Dept: WOUND CARE | Age: 54
Discharge: HOME | End: 2019-08-28
Payer: MEDICARE

## 2019-08-28 VITALS
TEMPERATURE: 97.2 F | RESPIRATION RATE: 18 BRPM | DIASTOLIC BLOOD PRESSURE: 61 MMHG | WEIGHT: 229 LBS | SYSTOLIC BLOOD PRESSURE: 104 MMHG | BODY MASS INDEX: 29.39 KG/M2 | HEIGHT: 74 IN | HEART RATE: 59 BPM

## 2019-08-28 PROCEDURE — 97597 DBRDMT OPN WND 1ST 20 CM/<: CPT | Performed by: INTERNAL MEDICINE

## 2019-08-28 PROCEDURE — 29581 APPL MULTLAYER CMPRN SYS LEG: CPT | Performed by: INTERNAL MEDICINE

## 2019-08-28 PROCEDURE — 17250 CHEM CAUT OF GRANLTJ TISSUE: CPT | Performed by: INTERNAL MEDICINE

## 2019-08-28 PROCEDURE — 97597 DBRDMT OPN WND 1ST 20 CM/<: CPT

## 2019-08-28 PROCEDURE — 17250 CHEM CAUT OF GRANLTJ TISSUE: CPT

## 2019-08-28 RX ORDER — LIDOCAINE HYDROCHLORIDE 40 MG/ML
SOLUTION TOPICAL ONCE
Status: DISCONTINUED | OUTPATIENT
Start: 2019-08-28 | End: 2019-08-29 | Stop reason: HOSPADM

## 2019-08-28 ASSESSMENT — PAIN SCALES - GENERAL: PAINLEVEL_OUTOF10: 3

## 2019-08-28 ASSESSMENT — PAIN - FUNCTIONAL ASSESSMENT: PAIN_FUNCTIONAL_ASSESSMENT: PREVENTS OR INTERFERES WITH ALL ACTIVE AND SOME PASSIVE ACTIVITIES

## 2019-08-28 ASSESSMENT — PAIN DESCRIPTION - ORIENTATION: ORIENTATION: LEFT

## 2019-08-28 ASSESSMENT — PAIN DESCRIPTION - ONSET: ONSET: ON-GOING

## 2019-08-28 ASSESSMENT — PAIN DESCRIPTION - DESCRIPTORS: DESCRIPTORS: BURNING;STABBING

## 2019-08-28 ASSESSMENT — PAIN DESCRIPTION - PROGRESSION: CLINICAL_PROGRESSION: NOT CHANGED

## 2019-08-28 ASSESSMENT — PAIN DESCRIPTION - LOCATION: LOCATION: SHOULDER

## 2019-08-28 ASSESSMENT — PAIN DESCRIPTION - PAIN TYPE: TYPE: CHRONIC PAIN

## 2019-08-28 NOTE — PLAN OF CARE
thigh  Surepress from stump to mid thigh  Single layer F spandagrip to help hold dressing in place  Leave in place for the week. Use a pillow or towel under your stump to help keep knee from flexing! Left calf wound:  Vashe spray to wound, let sit 1-2 minutes, no need to rinse  Calmoseptine to alin-wound  Mariana-moistened to wound   compri 2 lite (100% cotton for first layer) compression wrap  Football dressing (3 cast pad, ace wrap-no compression)   Keep clean, dry & intact for the week. 6.         Offloading --     Pressure ulcer offloading:       educated patient on importance of turning and repositioning at least every 2 hours, protective heel boots, pillows / wedges, ROHO-type cushion and Group II mattress TBA . Neuropathic ulcer offloading:  Football dressing to left heel & off-loading boot. 7.         Adjunctive therapies --      Date           Wnd #        Location                      CTP Rx           HBOT              NPWT       8 Right Ischial      Started 4/17/19     27 Thoracic     Stopped 11/28/19     47 & 48 Right BKA stump sites     Start 3/27/19  Stop 4/17/19   7/10/19         #47       Right lateral stump site       Oasis #1 applied           Left heel wound remains healed, will cont. With football dressing for off-loading. Left leg wound improving, will cont. With current wound care regime. Right lateral stump wound stable, debridement per MD, pt. tolerated Surepress for compression, will cont. With current wound care regime. Right knee wounds not as irritated this week, Ag Nitrate used per MD. Remaining wounds stable & overall showing improvement. Will cont. With dressings as ordered above. Pt. Is cont. To off-load as ordered with frequent repositioning from side to side & not sitting in chair. F/u in [facility] in 1 week as ordered. Discharge instructions reviewed with patient, all questions answered, copy given to patient.  Dressings were applied to all wounds per M.D.

## 2019-08-30 ENCOUNTER — TELEPHONE (OUTPATIENT)
Dept: INTERNAL MEDICINE CLINIC | Age: 54
End: 2019-08-30

## 2019-08-30 ENCOUNTER — OFFICE VISIT (OUTPATIENT)
Dept: INTERNAL MEDICINE CLINIC | Age: 54
End: 2019-08-30

## 2019-08-30 VITALS — RESPIRATION RATE: 18 BRPM | DIASTOLIC BLOOD PRESSURE: 75 MMHG | SYSTOLIC BLOOD PRESSURE: 110 MMHG | HEART RATE: 58 BPM

## 2019-08-30 DIAGNOSIS — L97.911 DIABETIC ULCER OF RIGHT LOWER LEG ASSOCIATED WITH TYPE 2 DIABETES MELLITUS, LIMITED TO BREAKDOWN OF SKIN (HCC): ICD-10-CM

## 2019-08-30 DIAGNOSIS — Z79.4 TYPE 2 DIABETES MELLITUS WITH FOOT ULCER, WITH LONG-TERM CURRENT USE OF INSULIN (HCC): Primary | ICD-10-CM

## 2019-08-30 DIAGNOSIS — D63.8 ANEMIA OF CHRONIC DISORDER: ICD-10-CM

## 2019-08-30 DIAGNOSIS — E11.622 DIABETIC ULCER OF RIGHT LOWER LEG ASSOCIATED WITH TYPE 2 DIABETES MELLITUS, LIMITED TO BREAKDOWN OF SKIN (HCC): ICD-10-CM

## 2019-08-30 DIAGNOSIS — E11.621 TYPE 2 DIABETES MELLITUS WITH FOOT ULCER, WITH LONG-TERM CURRENT USE OF INSULIN (HCC): ICD-10-CM

## 2019-08-30 DIAGNOSIS — Z89.511 S/P BKA (BELOW KNEE AMPUTATION) UNILATERAL, RIGHT (HCC): ICD-10-CM

## 2019-08-30 DIAGNOSIS — E11.621 TYPE 2 DIABETES MELLITUS WITH FOOT ULCER, WITH LONG-TERM CURRENT USE OF INSULIN (HCC): Primary | ICD-10-CM

## 2019-08-30 DIAGNOSIS — L97.509 TYPE 2 DIABETES MELLITUS WITH FOOT ULCER, WITH LONG-TERM CURRENT USE OF INSULIN (HCC): Primary | ICD-10-CM

## 2019-08-30 DIAGNOSIS — L97.509 TYPE 2 DIABETES MELLITUS WITH FOOT ULCER, WITH LONG-TERM CURRENT USE OF INSULIN (HCC): ICD-10-CM

## 2019-08-30 DIAGNOSIS — E78.2 MIXED HYPERLIPIDEMIA: Chronic | ICD-10-CM

## 2019-08-30 DIAGNOSIS — G82.21 PARAPLEGIA, COMPLETE (HCC): ICD-10-CM

## 2019-08-30 DIAGNOSIS — M62.838 MUSCLE SPASM: ICD-10-CM

## 2019-08-30 DIAGNOSIS — N31.9 NEUROGENIC BLADDER: ICD-10-CM

## 2019-08-30 DIAGNOSIS — K21.9 GASTROESOPHAGEAL REFLUX DISEASE WITHOUT ESOPHAGITIS: ICD-10-CM

## 2019-08-30 DIAGNOSIS — Z79.4 TYPE 2 DIABETES MELLITUS WITH FOOT ULCER, WITH LONG-TERM CURRENT USE OF INSULIN (HCC): ICD-10-CM

## 2019-08-30 LAB
A/G RATIO: 1.1 (ref 1.1–2.2)
ALBUMIN SERPL-MCNC: 4.2 G/DL (ref 3.4–5)
ALP BLD-CCNC: 137 U/L (ref 40–129)
ALT SERPL-CCNC: 13 U/L (ref 10–40)
ANION GAP SERPL CALCULATED.3IONS-SCNC: 17 MMOL/L (ref 3–16)
AST SERPL-CCNC: 17 U/L (ref 15–37)
BILIRUB SERPL-MCNC: 0.3 MG/DL (ref 0–1)
BUN BLDV-MCNC: 52 MG/DL (ref 7–20)
CALCIUM SERPL-MCNC: 10 MG/DL (ref 8.3–10.6)
CHLORIDE BLD-SCNC: 92 MMOL/L (ref 99–110)
CHOLESTEROL, TOTAL: 192 MG/DL (ref 0–199)
CO2: 24 MMOL/L (ref 21–32)
CREAT SERPL-MCNC: 1.2 MG/DL (ref 0.9–1.3)
CREATININE URINE: 36.4 MG/DL (ref 39–259)
GFR AFRICAN AMERICAN: >60
GFR NON-AFRICAN AMERICAN: >60
GLOBULIN: 3.9 G/DL
GLUCOSE BLD-MCNC: 205 MG/DL (ref 70–99)
HDLC SERPL-MCNC: 30 MG/DL (ref 40–60)
LDL CHOLESTEROL CALCULATED: ABNORMAL MG/DL
LDL CHOLESTEROL DIRECT: 128 MG/DL
MICROALBUMIN UR-MCNC: 14.7 MG/DL
MICROALBUMIN/CREAT UR-RTO: 403.8 MG/G (ref 0–30)
POTASSIUM SERPL-SCNC: 4.6 MMOL/L (ref 3.5–5.1)
SODIUM BLD-SCNC: 133 MMOL/L (ref 136–145)
TOTAL PROTEIN: 8.1 G/DL (ref 6.4–8.2)
TRIGL SERPL-MCNC: 305 MG/DL (ref 0–150)
VLDLC SERPL CALC-MCNC: ABNORMAL MG/DL

## 2019-08-30 PROCEDURE — G8427 DOCREV CUR MEDS BY ELIG CLIN: HCPCS | Performed by: INTERNAL MEDICINE

## 2019-08-30 PROCEDURE — 99214 OFFICE O/P EST MOD 30 MIN: CPT | Performed by: INTERNAL MEDICINE

## 2019-08-30 PROCEDURE — 3045F PR MOST RECENT HEMOGLOBIN A1C LEVEL 7.0-9.0%: CPT | Performed by: INTERNAL MEDICINE

## 2019-08-30 PROCEDURE — 2022F DILAT RTA XM EVC RTNOPTHY: CPT | Performed by: INTERNAL MEDICINE

## 2019-08-30 PROCEDURE — 1036F TOBACCO NON-USER: CPT | Performed by: INTERNAL MEDICINE

## 2019-08-30 PROCEDURE — 3017F COLORECTAL CA SCREEN DOC REV: CPT | Performed by: INTERNAL MEDICINE

## 2019-08-30 PROCEDURE — G8598 ASA/ANTIPLAT THER USED: HCPCS | Performed by: INTERNAL MEDICINE

## 2019-08-30 PROCEDURE — G8417 CALC BMI ABV UP PARAM F/U: HCPCS | Performed by: INTERNAL MEDICINE

## 2019-08-30 RX ORDER — FERROUS SULFATE 325(65) MG
325 TABLET ORAL
Qty: 90 TABLET | Refills: 1 | Status: SHIPPED | OUTPATIENT
Start: 2019-08-30 | End: 2020-02-27

## 2019-08-30 RX ORDER — METOPROLOL SUCCINATE 100 MG/1
TABLET, EXTENDED RELEASE ORAL
Qty: 90 TABLET | Refills: 0 | Status: SHIPPED | OUTPATIENT
Start: 2019-08-30 | End: 2020-01-02

## 2019-08-30 RX ORDER — TORSEMIDE 20 MG/1
20 TABLET ORAL DAILY
Qty: 90 TABLET | Refills: 1 | Status: SHIPPED | OUTPATIENT
Start: 2019-08-30 | End: 2019-11-18 | Stop reason: SDUPTHER

## 2019-08-30 RX ORDER — ROSUVASTATIN CALCIUM 20 MG/1
20 TABLET, COATED ORAL NIGHTLY
Qty: 90 TABLET | Refills: 1 | Status: SHIPPED | OUTPATIENT
Start: 2019-08-30 | End: 2020-05-15 | Stop reason: SDUPTHER

## 2019-08-30 RX ORDER — SENNA PLUS 8.6 MG/1
TABLET ORAL
Qty: 180 TABLET | Refills: 0 | Status: SHIPPED | OUTPATIENT
Start: 2019-08-30 | End: 2019-12-03 | Stop reason: SDUPTHER

## 2019-08-30 RX ORDER — FERROUS SULFATE 325(65) MG
325 TABLET ORAL
Qty: 90 TABLET | Refills: 1 | Status: SHIPPED | OUTPATIENT
Start: 2019-08-30 | End: 2019-08-30 | Stop reason: SDUPTHER

## 2019-08-30 RX ORDER — PANTOPRAZOLE SODIUM 40 MG/1
40 TABLET, DELAYED RELEASE ORAL DAILY
Qty: 90 TABLET | Refills: 1 | Status: SHIPPED | OUTPATIENT
Start: 2019-08-30 | End: 2020-04-10

## 2019-08-30 RX ORDER — TRAZODONE HYDROCHLORIDE 50 MG/1
50 TABLET ORAL NIGHTLY
Qty: 90 TABLET | Refills: 0 | Status: SHIPPED | OUTPATIENT
Start: 2019-08-30 | End: 2020-03-05

## 2019-08-30 ASSESSMENT — PATIENT HEALTH QUESTIONNAIRE - PHQ9
2. FEELING DOWN, DEPRESSED OR HOPELESS: 1
SUM OF ALL RESPONSES TO PHQ9 QUESTIONS 1 & 2: 1
SUM OF ALL RESPONSES TO PHQ QUESTIONS 1-9: 1
SUM OF ALL RESPONSES TO PHQ QUESTIONS 1-9: 1
1. LITTLE INTEREST OR PLEASURE IN DOING THINGS: 0

## 2019-08-30 ASSESSMENT — ENCOUNTER SYMPTOMS
SHORTNESS OF BREATH: 0
RHINORRHEA: 0
CHEST TIGHTNESS: 0
COLOR CHANGE: 0

## 2019-08-31 LAB
ESTIMATED AVERAGE GLUCOSE: 191.5 MG/DL
HBA1C MFR BLD: 8.3 %

## 2019-09-03 NOTE — PROGRESS NOTES
wound, full thickness, onset 2/5/19, gradually appeared-Wound Width (cm): 1.5 cm  Wound 04/10/19 # 50, Right Medial Knee, Garrick BLANC 1, (onset 2018) Gradually appeared-Wound Width (cm): 0.4 cm  Wound 04/10/19 #49, Right Knee Anterior, Garrick BLANC 1, (onset 2018), gradually appeared-Wound Width (cm): 0.7 cm  Wound 08/07/19 #53, Left Lateral Leg-CLUSTER, Pressure Injury, Stage 2, Onset 8/1/19-Wound Width (cm): 0.5 cm  Wound 08/16/17 #27- Thoracic spine, dehisced surgical wound, full thickness, onset 8/16/2017, pressure-Wound Width (cm): 1.1 cm  Wound 10/17/14 #8, right ischium, stage 4 PU, onset 7/15/14 (merged with #30), pressure-Wound Width (cm): 0.9 cm    Wound 07/03/19 #51, Right Upper Thigh, Pressure Injury, Stage 4, Onset 7/15/14-Wound Depth (cm): 0.1 cm  Wound 08/21/19 #54, Posterior Scrotom, Pressure Injury, Stage 2, Onset 8/19/19-Wound Depth (cm): 0.1 cm  Wound 01/23/19 #45, Left Ischium, Pressure, Stage 4, onset 1/16/19, Pressure -Wound Depth (cm): 0.1 cm  Wound 02/06/19 #47, Right BKA amp site-Lateral, dehisced wound, full thickness, onset 2/5/19, gradually appeared-Wound Depth (cm): 0.1 cm  Wound 04/10/19 # 50, Right Medial Knee, Garrick BLANC 1, (onset 2018) Gradually appeared-Wound Depth (cm): 0.1 cm  Wound 04/10/19 #49, Right Knee Anterior, Garrick BLANC 1, (onset 2018), gradually appeared-Wound Depth (cm): 0.1 cm  Wound 08/07/19 #53, Left Lateral Leg-CLUSTER, Pressure Injury, Stage 2, Onset 8/1/19-Wound Depth (cm): 0.1 cm  Wound 08/16/17 #27- Thoracic spine, dehisced surgical wound, full thickness, onset 8/16/2017, pressure-Wound Depth (cm): 0.4 cm  Wound 10/17/14 #8, right ischium, stage 4 PU, onset 7/15/14 (merged with #30), pressure-Wound Depth (cm): 0.1 cm    Assessment:     Patient Active Problem List   Diagnosis Code    Mixed hyperlipidemia E78.2    Coronary artery disease involving native coronary artery of native heart without angina pectoris I25.10    Paraplegia, complete ([facility] [address] G82.21    appeared-Dressing/Treatment: (triamcinolone xeroform cast padding surepress F spandagrip)  Wound 08/07/19 #53, Left Lateral Leg-CLUSTER, Pressure Injury, Stage 2, Onset 8/1/19-Dressing/Treatment: (vashe calmoseptine yoni compr2 lite 3 cast padding ace)  Wound 08/16/17 #27- Thoracic spine, dehisced surgical wound, full thickness, onset 8/16/2017, pressure-Dressing/Treatment: (vashe calmoseptine triamcinolone silver alginate mepilex )  Wound 10/17/14 #8, right ischium, stage 4 PU, onset 7/15/14 (merged with #30), pressure-Dressing/Treatment: (calmoseptine silver alginate mepilex border). Per physician order, left application of multilayer compression wrap was performed in the wound care center today, to help manage edema, stasis dermatitis, and/or venous ulcers. Leave primary dressing and multi-layer wrap(s) in place until the next appointment. Also discussed ways to keep the wrap dry for a shower, including a plastic cast-guard, available in retail pharmacies. He should call before his next visit if there is any significant pain, significant strike-through of drainage to the surface of the wrap, or any significant sense of the wrap sliding down more than an inch or so, bunching-up and abrading his skin. I reminded the patient of the importance of weight management and smoking cessation, if applicable; also encouraged ambulation as tolerated, additional lower extremity exercises as instructed in our education sheet, leg elevation when at rest, and compliance with any recommended dietary, diuretic and compression therapies. Await word on ROHO Heel Pad boot from his waiver . Keep up the good work with offloading, protein intake, glucose control.      Will hold off on speaking to cardiology and spinal surgery until some of these other ulcers are more durably healed (I'd hate to have him involved in multiple new appts, testing, etc, and have that time out of bed jeopardize the healing progress he's

## 2019-09-04 ENCOUNTER — HOSPITAL ENCOUNTER (OUTPATIENT)
Dept: WOUND CARE | Age: 54
Discharge: HOME | End: 2019-09-04
Payer: MEDICARE

## 2019-09-04 VITALS
TEMPERATURE: 97.3 F | BODY MASS INDEX: 29.92 KG/M2 | RESPIRATION RATE: 18 BRPM | HEART RATE: 66 BPM | SYSTOLIC BLOOD PRESSURE: 101 MMHG | DIASTOLIC BLOOD PRESSURE: 62 MMHG | WEIGHT: 233 LBS

## 2019-09-04 PROCEDURE — 97597 DBRDMT OPN WND 1ST 20 CM/<: CPT | Performed by: INTERNAL MEDICINE

## 2019-09-04 PROCEDURE — 87186 SC STD MICRODIL/AGAR DIL: CPT

## 2019-09-04 PROCEDURE — 87205 SMEAR GRAM STAIN: CPT

## 2019-09-04 PROCEDURE — 87070 CULTURE OTHR SPECIMN AEROBIC: CPT

## 2019-09-04 PROCEDURE — 17250 CHEM CAUT OF GRANLTJ TISSUE: CPT

## 2019-09-04 PROCEDURE — 17250 CHEM CAUT OF GRANLTJ TISSUE: CPT | Performed by: INTERNAL MEDICINE

## 2019-09-04 PROCEDURE — 97597 DBRDMT OPN WND 1ST 20 CM/<: CPT

## 2019-09-04 PROCEDURE — 87077 CULTURE AEROBIC IDENTIFY: CPT

## 2019-09-04 PROCEDURE — 29581 APPL MULTLAYER CMPRN SYS LEG: CPT | Performed by: INTERNAL MEDICINE

## 2019-09-04 RX ORDER — LIDOCAINE HYDROCHLORIDE 40 MG/ML
SOLUTION TOPICAL ONCE
Status: DISCONTINUED | OUTPATIENT
Start: 2019-09-04 | End: 2019-09-05 | Stop reason: HOSPADM

## 2019-09-04 ASSESSMENT — PAIN DESCRIPTION - PROGRESSION: CLINICAL_PROGRESSION: NOT CHANGED

## 2019-09-04 ASSESSMENT — PAIN DESCRIPTION - PAIN TYPE: TYPE: CHRONIC PAIN

## 2019-09-04 ASSESSMENT — PAIN SCALES - GENERAL: PAINLEVEL_OUTOF10: 0

## 2019-09-04 ASSESSMENT — PAIN DESCRIPTION - ONSET: ONSET: ON-GOING

## 2019-09-04 ASSESSMENT — PAIN DESCRIPTION - ORIENTATION: ORIENTATION: LEFT

## 2019-09-04 ASSESSMENT — ACTIVITIES OF DAILY LIVING (ADL): EFFECT OF PAIN ON DAILY ACTIVITIES: REPOSITIONING

## 2019-09-04 ASSESSMENT — PAIN DESCRIPTION - FREQUENCY: FREQUENCY: CONTINUOUS

## 2019-09-04 ASSESSMENT — PAIN DESCRIPTION - DESCRIPTORS: DESCRIPTORS: BURNING;STABBING

## 2019-09-04 ASSESSMENT — PAIN DESCRIPTION - LOCATION: LOCATION: SHOULDER

## 2019-09-04 ASSESSMENT — PAIN - FUNCTIONAL ASSESSMENT: PAIN_FUNCTIONAL_ASSESSMENT: PREVENTS OR INTERFERES WITH ALL ACTIVE AND SOME PASSIVE ACTIVITIES

## 2019-09-05 NOTE — PLAN OF CARE
Transfer Ag over knee wounds  Silver alginate to amputation stump wound  Cover with 4x4's  2 rolls of cast padding from stump to mid thigh  Surepress from stump to mid thigh  Single layer F spandagrip to help hold dressing in place  Leave in place for the week. Use a pillow or towel under your stump to help keep knee from flexing! Left calf wound:  Vashe spray to wound, let sit 1-2 minutes, no need to rinse  Adaptic to anything open or fragile   compri 2 lite (100% cotton for first layer) compression wrap  Football dressing (3 cast pad, ace wrap-no compression)   Keep clean, dry & intact for the week. 6.         Offloading --     Pressure ulcer offloading:       educated patient on importance of turning and repositioning at least every 2 hours, protective heel boots, pillows / wedges, ROHO-type cushion and Group II mattress TBA . Neuropathic ulcer offloading:  Football dressing to left heel & off-loading boot. 7.         Adjunctive therapies --      Date           Wnd #        Location                      CTP Rx           HBOT              NPWT       8 Right Ischial      Started 4/17/19     27 Thoracic     Stopped 11/28/19     47 & 48 Right BKA stump sites     Start 3/27/19  Stop 4/17/19   7/10/19         #47       Right lateral stump site       Oasis #1 applied           Left heel wound remains healed, will cont. With football dressing for off-loading. Left leg wound improved, will change to adaptic, cont. With compri 2 lite for compression. Right lateral stump wound stable, debridement per MD, pt. tolerated Surepress for compression, will cont. With current wound care regime. Right knee wounds with erythema & worsening again this week, wound culture obtained, will await antibiotics pending wound culture results. Will also change dressing to knee wounds as ordered above to help further pad & protect. Right thigh wound healed, will cont. To protect for an additional week.  Remaining wounds

## 2019-09-06 LAB
GRAM STAIN RESULT: ABNORMAL
ORGANISM: ABNORMAL
WOUND/ABSCESS: ABNORMAL

## 2019-09-06 RX ORDER — CETIRIZINE HYDROCHLORIDE 10 MG/1
10 TABLET ORAL DAILY
Qty: 30 TABLET | Refills: 0 | Status: SHIPPED | OUTPATIENT
Start: 2019-09-06 | End: 2019-10-07 | Stop reason: SDUPTHER

## 2019-09-06 RX ORDER — DOXYCYCLINE HYCLATE 100 MG/1
100 CAPSULE ORAL 2 TIMES DAILY
Qty: 14 CAPSULE | Refills: 0 | Status: SHIPPED | OUTPATIENT
Start: 2019-09-06 | End: 2019-09-18 | Stop reason: ALTCHOICE

## 2019-09-06 NOTE — TELEPHONE ENCOUNTER
----- Message from Jose Angel Mesa MD sent at 9/6/2019 11:14 AM EDT -----  Contact: Patient  Please do cetirizine 10 mg daily     ----- Message -----  From: Sunil Means MA  Sent: 9/6/2019   9:54 AM EDT  To: Jose Angel Mesa MD    Patient states Loratadine 10 mg once daily was supposed to be called in on Friday when he was here for his appt. Please advise.     Roxanne Drummond

## 2019-09-10 PROBLEM — L89.214: Status: RESOLVED | Noted: 2019-06-27 | Resolved: 2019-09-10

## 2019-09-10 NOTE — PROGRESS NOTES
[facility] Progress Note    Ekat Anton     : 1965    DATE OF VISIT:  2019    Subjective:     Ekta Anton is a 48 y.o. male who has a pressure ulcer located on the perineum, the left and right ischium and left  lower leg. Significant symptoms or pertinent wound history since last visit: feeling ok overall, no F/C/D, no N/V/D, no CP or SOB. Doing well with offloading and glucose control. Trying to take extra measures to protect the right knee, where we've been concerned there might be recurrent trauma or friction occurring at home each week (since that area is small but has been so resistant to healing). Additional ulcer(s) noted? yes. T-spine surgical, right knee traumatic, right BK stump diabetic; right thigh appears healed this week. His current medication list consists of apixaban, aspirin, blood glucose test strips, cetirizine, cyclobenzaprine, docusate sodium, ferrous sulfate, insulin glargine, insulin lispro, loratadine, magnesium oxide, metFORMIN, metoprolol succinate, nitroGLYCERIN, nystatin, oxyCODONE, pantoprazole, polyethylene glycol, promethazine, rosuvastatin, senna, torsemide, traZODone, and triamcinolone. Allergies: Benadryl [diphenhydramine hcl]; Statins [statins];  Cephalexin; Morphine; Penicillins; and Sulfa antibiotics    Objective:     Vitals:    19 1047   BP: 101/62   Pulse: 66   Resp: 18   Temp: 97.3 °F (36.3 °C)   TempSrc: Oral   Weight: 233 lb (105.7 kg)     AAOx3, fatigued, NAD  Intact left distal pulses, right BK stump warm and well perfused  No angitis or fluctuance but a possible new cellulitis at the right knee this week (could be mixed with some erythema due to dressings and/or ongoing friction or recurrent trauma)  Very mild LLE edema, still mild-mod RLE   No Candidiasis, minimal skin tears or other dressing adhesive irritation around pelvis  Chetna-ulcer skin: generally pink and indurated, but some erythema at right knee border)  Wound 08/21/19 #54, Posterior Scrotom, Pressure Injury, Stage 2, Onset 8/19/19-Dressing/Treatment: (calmoseptine silver alginate mepilex border)  Wound 01/23/19 #45, Left Ischium, Pressure, Stage 4, onset 1/16/19, Pressure -Dressing/Treatment: (calmoseptine silver alginate mepilex border). I sent a culture from exudate from the right knee wound today (before silver nitrating it). Await Cx results before I decide if we're going to start a course of Abx. Call me in the meantime if F/C/D, other systemic symptoms. Keep up good work with offloading, protein intake, glucose control. Still waiting to hear about waiver program coverage of a ROHO Heel Pad boot. Per physician order, left application of multilayer compression wrap was performed in the wound care center today, to help manage edema, stasis dermatitis, and/or venous ulcers. Leave primary dressing and multi-layer wrap(s) in place until the next appointment. Also discussed ways to keep the wrap dry for a shower, including a plastic cast-guard, available in retail pharmacies. He should call before his next visit if there is any significant pain, significant strike-through of drainage to the surface of the wrap, or any significant sense of the wrap sliding down more than an inch or so, bunching-up and abrading his skin. I reminded the patient of the importance of weight management and smoking cessation, if applicable; also encouraged ambulation as tolerated, additional lower extremity exercises as instructed in our education sheet, leg elevation when at rest, and compliance with any recommended dietary, diuretic and compression therapies. Home treatment: good handwashing before and after any dressing changes. Cleanse wound with saline or soap & water before dressing change. May use Vaseline (petrolatum), Aquaphor, Aveeno, CeraVe, Cetaphil, Eucerin, Lubriderm, etc for dry skin.      Dressing type for home: as above for the back, ischial and

## 2019-09-11 ENCOUNTER — HOSPITAL ENCOUNTER (OUTPATIENT)
Dept: WOUND CARE | Age: 54
Discharge: HOME | End: 2019-09-11
Payer: MEDICARE

## 2019-09-11 VITALS
RESPIRATION RATE: 18 BRPM | TEMPERATURE: 97 F | DIASTOLIC BLOOD PRESSURE: 69 MMHG | HEART RATE: 67 BPM | WEIGHT: 233 LBS | SYSTOLIC BLOOD PRESSURE: 113 MMHG | HEIGHT: 74 IN | BODY MASS INDEX: 29.9 KG/M2

## 2019-09-11 PROCEDURE — 17250 CHEM CAUT OF GRANLTJ TISSUE: CPT | Performed by: INTERNAL MEDICINE

## 2019-09-11 PROCEDURE — 97597 DBRDMT OPN WND 1ST 20 CM/<: CPT | Performed by: INTERNAL MEDICINE

## 2019-09-11 PROCEDURE — 97597 DBRDMT OPN WND 1ST 20 CM/<: CPT

## 2019-09-11 PROCEDURE — 17250 CHEM CAUT OF GRANLTJ TISSUE: CPT

## 2019-09-11 PROCEDURE — 29580 STRAPPING UNNA BOOT: CPT

## 2019-09-11 PROCEDURE — 29581 APPL MULTLAYER CMPRN SYS LEG: CPT | Performed by: INTERNAL MEDICINE

## 2019-09-11 RX ORDER — LIDOCAINE 40 MG/G
CREAM TOPICAL ONCE
Status: DISCONTINUED | OUTPATIENT
Start: 2019-09-11 | End: 2019-09-12 | Stop reason: HOSPADM

## 2019-09-11 ASSESSMENT — PAIN SCALES - GENERAL: PAINLEVEL_OUTOF10: 3

## 2019-09-11 ASSESSMENT — PAIN DESCRIPTION - ONSET: ONSET: ON-GOING

## 2019-09-11 ASSESSMENT — PAIN DESCRIPTION - PROGRESSION: CLINICAL_PROGRESSION: NOT CHANGED

## 2019-09-11 ASSESSMENT — PAIN DESCRIPTION - ORIENTATION: ORIENTATION: LEFT

## 2019-09-11 ASSESSMENT — PAIN - FUNCTIONAL ASSESSMENT: PAIN_FUNCTIONAL_ASSESSMENT: PREVENTS OR INTERFERES SOME ACTIVE ACTIVITIES AND ADLS

## 2019-09-11 ASSESSMENT — PAIN DESCRIPTION - FREQUENCY: FREQUENCY: CONTINUOUS

## 2019-09-11 ASSESSMENT — PAIN DESCRIPTION - DESCRIPTORS: DESCRIPTORS: BURNING;STABBING

## 2019-09-11 ASSESSMENT — PAIN DESCRIPTION - PAIN TYPE: TYPE: CHRONIC PAIN

## 2019-09-11 ASSESSMENT — PAIN DESCRIPTION - LOCATION: LOCATION: NECK;SHOULDER

## 2019-09-12 NOTE — PLAN OF CARE
[facility] Summary     1. General --     Active wound etiologies:                     pressure (stage 4). PCP and pertinent consultants: Najma Patterson MD                                                               [facility] for power chair & off-loading cushions for chair     Other plans for overall health:                             [facility]:                        provided by home care company     Most recent [facility] lab results:               Lab Results   Component Value Date     LABA1C 8.2 08/30/2019                Lab Results   Component Value Date     LABALBU 4.2 08/30/2019                Lab Results   Component Value Date     CREATININE 1.2 08/30/2019                Lab Results   Component Value Date     HGB 9.6 (L) 06/13/2019         2. Circulatory status, if lower extremity ulcer --     Most recent RAMAN (none since 4/2016):                        No data recorded  No data recorded  Most recent arterial imaging:              None since 4/2016     Most recent arterial Rx:                      None     Current management of edema:        periodic leg elevation, diuretics (Torsemide) and static compression (tspandagrip ). Most recent venous imaging:              None in 2018 or 2019     3. Debridement --     Debridement methods, past or present:         Sharp. Pertinent surgical history for wound(s):            Right BKA 1/15/19  Bone debridement of left heel per Dr Markie Ely 4/2019  Right Ischial wound-tunnel unroofed per Dr Markie Ely 6/12/19     4.          Infection --      Recent culture results:            4/10/19 Left heel bone Culture     WOUND/ABSCESS 04/10/2019  1:10 PM [facility] Lab   Gram Stain Result Abnormal  04/10/2019  1:10 PM [address] [place] [address] Lab   4+ RBC's   1+ WBC's (Polymorphonuclear)   3+ Gram positive cocci    Organism

## 2019-09-16 NOTE — PROGRESS NOTES
[address] [facility] Progress Note    Jasiel Moran     : 1965    DATE OF VISIT:  2019    Subjective:     Jasiel Moran is a 48 y.o. male who has a pressure ulcer located on the left and right ischium, and the scrotum/perineum. Significant symptoms or pertinent wound history since last visit: doing ok overall. No F/C/D, started doxycycline last Friday after his right knee WCx came back - no sore throat or mouth, no N/V/abd pain, mild diarrhea. No CP or SOB. Drainage from truncal ulcers stable. Expects to speak with his waiver  this week about a lift at his new house, and his [address] [place] [address] Avenue. Additional ulcer(s) noted? yes. T-spine surgical, right knee and right BK stump; left lateral leg delicately healed. His current medication list consists of doxycycline, apixaban, aspirin, blood glucose test strips, cetirizine, cyclobenzaprine, docusate sodium, ferrous sulfate, insulin glargine, insulin lispro, loratadine, magnesium oxide, metFORMIN, metoprolol succinate, nitroGLYCERIN, nystatin, oxyCODONE, pantoprazole, polyethylene glycol, promethazine, rosuvastatin, senna, torsemide, traZODone, and triamcinolone. Allergies: Benadryl [diphenhydramine hcl]; Statins [statins];  Cephalexin; Morphine; Penicillins; and Sulfa antibiotics    Objective:     Vitals:    19 1039   BP: 113/69   Pulse: 67   Resp: 18   Temp: 97 °F (36.1 °C)   TempSrc: Oral   Weight: 233 lb (105.7 kg)   Height: 6' 2\" (1.88 m)     AAOx3, overweight, fatigued, NAD  No icterus, thrush, drug rash, abd tenderness  Right patellar area still with some erythema, but it looks more ecchymotic and irritated, not as cellulitic  No lymphangitis, no fluctuance, and no signs of acute arthritis or prepatellar bursitis; medial knee looks healthier, it's just right at the patella that I'm concerned  Mild LLE edema, still a bit of edema at the right stump, but not across the knee  Chetna-ulcer skin: generally pink 8/16/2017, pressure-Dressing/Treatment: (vashe calmoseptine triamcinolone mepilex border)  Wound 10/17/14 #8, right ischium, stage 4 PU, onset 7/15/14 (merged with #30), pressure-Dressing/Treatment: (calmoseptine yoni mepilex border)  Wound 01/23/19 #45, Left Ischium, Pressure, Stage 4, onset 1/16/19, Pressure -Dressing/Treatment: (calmoseptine yoni mepilex border)  Wound 08/21/19 #54, Posterior Scrotum, Pressure Injury, Stage 2, Onset 8/19/19-Dressing/Treatment: (calmoseptine yoni mepilex border). Await word on getting his ROHO Heal Pad boot. When he has that, he can hopefully go to daily use of both that for offloading, and his CircAid for LLE compression. Per physician order, left application of multilayer compression wrap was performed in the wound care center today, to help manage edema, stasis dermatitis, and/or venous ulcers. Leave primary dressing and multi-layer wrap(s) in place until the next appointment. Also discussed ways to keep the wrap dry for a shower, including a plastic cast-guard, available in retail pharmacies. He should call before his next visit if there is any significant pain, significant strike-through of drainage to the surface of the wrap, or any significant sense of the wrap sliding down more than an inch or so, bunching-up and abrading his skin. I reminded the patient of the importance of weight management and smoking cessation, if applicable; also encouraged ambulation as tolerated, additional lower extremity exercises as instructed in our education sheet, leg elevation when at rest, and compliance with any recommended dietary, diuretic and compression therapies. Complete doxycycline as prescribed. Will need to back off compression on the RLE (which might slow down healing of the stump wound, but I'm hopeful will allow the knee to start to rebound), and will have those areas dressed a few times per week, to work on autolytic debridement of the knee cluster.

## 2019-09-18 ENCOUNTER — HOSPITAL ENCOUNTER (OUTPATIENT)
Dept: WOUND CARE | Age: 54
Discharge: HOME | End: 2019-09-18
Payer: MEDICARE

## 2019-09-18 VITALS
HEIGHT: 74 IN | SYSTOLIC BLOOD PRESSURE: 117 MMHG | HEART RATE: 66 BPM | RESPIRATION RATE: 18 BRPM | TEMPERATURE: 97.3 F | WEIGHT: 233 LBS | DIASTOLIC BLOOD PRESSURE: 70 MMHG | BODY MASS INDEX: 29.9 KG/M2

## 2019-09-18 PROCEDURE — 29581 APPL MULTLAYER CMPRN SYS LEG: CPT | Performed by: INTERNAL MEDICINE

## 2019-09-18 PROCEDURE — 11042 DBRDMT SUBQ TIS 1ST 20SQCM/<: CPT | Performed by: INTERNAL MEDICINE

## 2019-09-18 PROCEDURE — 17250 CHEM CAUT OF GRANLTJ TISSUE: CPT

## 2019-09-18 PROCEDURE — 11042 DBRDMT SUBQ TIS 1ST 20SQCM/<: CPT

## 2019-09-18 PROCEDURE — 97597 DBRDMT OPN WND 1ST 20 CM/<: CPT

## 2019-09-18 PROCEDURE — 17250 CHEM CAUT OF GRANLTJ TISSUE: CPT | Performed by: INTERNAL MEDICINE

## 2019-09-18 RX ORDER — LIDOCAINE 40 MG/G
CREAM TOPICAL ONCE
Status: DISCONTINUED | OUTPATIENT
Start: 2019-09-18 | End: 2019-09-19 | Stop reason: HOSPADM

## 2019-09-18 RX ORDER — FLUCONAZOLE 100 MG/1
100 TABLET ORAL DAILY
Qty: 7 TABLET | Refills: 0 | Status: SHIPPED | OUTPATIENT
Start: 2019-09-18 | End: 2019-09-25

## 2019-09-18 RX ORDER — AZELASTINE HYDROCHLORIDE 0.5 MG/ML
1 SOLUTION/ DROPS OPHTHALMIC 2 TIMES DAILY
Qty: 6 ML | Refills: 1 | Status: SHIPPED | OUTPATIENT
Start: 2019-09-18 | End: 2019-10-23

## 2019-09-18 ASSESSMENT — PAIN DESCRIPTION - DESCRIPTORS: DESCRIPTORS: BURNING;STABBING

## 2019-09-18 ASSESSMENT — PAIN DESCRIPTION - FREQUENCY: FREQUENCY: CONTINUOUS

## 2019-09-18 ASSESSMENT — PAIN DESCRIPTION - PROGRESSION: CLINICAL_PROGRESSION: NOT CHANGED

## 2019-09-18 ASSESSMENT — PAIN DESCRIPTION - PAIN TYPE: TYPE: CHRONIC PAIN

## 2019-09-18 ASSESSMENT — PAIN DESCRIPTION - LOCATION: LOCATION: SHOULDER;NECK

## 2019-09-18 ASSESSMENT — PAIN DESCRIPTION - ORIENTATION: ORIENTATION: LEFT

## 2019-09-18 ASSESSMENT — PAIN SCALES - GENERAL: PAINLEVEL_OUTOF10: 3

## 2019-09-18 ASSESSMENT — PAIN DESCRIPTION - ONSET: ONSET: ON-GOING

## 2019-09-24 ENCOUNTER — TELEPHONE (OUTPATIENT)
Dept: WOUND CARE | Age: 54
End: 2019-09-24

## 2019-09-24 PROBLEM — L97.913 DIABETIC ULCER OF RIGHT LOWER LEG ASSOCIATED WITH TYPE 2 DIABETES MELLITUS, WITH NECROSIS OF MUSCLE (HCC): Status: ACTIVE | Noted: 2019-05-01

## 2019-09-24 PROBLEM — E11.622 DIABETIC ULCER OF RIGHT LOWER LEG ASSOCIATED WITH TYPE 2 DIABETES MELLITUS, WITH NECROSIS OF MUSCLE (HCC): Status: ACTIVE | Noted: 2019-05-01

## 2019-09-24 LAB
CATARACTS: POSITIVE
DIABETIC RETINOPATHY: NEGATIVE
GLAUCOMA: NEGATIVE
INTRAOCULAR PRESSURE EYE: NORMAL
VISUAL ACUITY DISTANCE LEFT EYE: NORMAL
VISUAL ACUITY DISTANCE RIGHT EYE: NORMAL

## 2019-09-24 NOTE — PROGRESS NOTES
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    LIBORIO on CPAP G47.33, Z99.89    Below knee amputation status, right (HCC) Z89.511    Pressure ulcer of left ischium, stage 4 (HCC) L89.324    Diabetic ulcer of right knee associated with type 2 diabetes mellitus, with exposure of muscle ([facility]) E11.622, L97.913    TIA (transient ischemic attack) G45.9    Diabetic ulcer of right lower leg associated with type 2 diabetes mellitus, limited to breakdown of skin ([facility]) E11.622, L97.911    Lower extremity edema R60.0       Assessment of today's active condition(s): DM, paraplegia, multiple nonhealing wounds related to pressure, prior surgeries, prior infections; though the increased glucoses are a possible concern for a deep infection, I don't see any evidence of one on exam; I think the back bled more just because of the hypergranulation; not sure why the ischium apparently drained more, but there is no cellulitis, no fluctuance, no deeper sinus tracts, wounds not larger there; right knee wound was recently infected, but I think that's resolved; eye symptoms seem most likely allergic to me, though he's still using an antibacterial eye drop. Factors contributing to occurrence and/or persistence of the chronic ulcer include edema, diabetes, chronic pressure, decreased mobility and shear force. Medical necessity of today's visit is shown by the above documentation. Sharp debridement is indicated today, based upon the exam findings in the wound(s) above. Procedure note:     Consent obtained. Time out performed per [facility] policy.     Anesthetic  Anesthetic: good work with protein intake and offloading. Await word from the [facility] waiver program on coverage of his [address]; we need to keep wrapping him until he has that, I believe, since the left heel is so delicately healed. Home treatment: good handwashing before and after any dressing changes. Cleanse wound with saline or soap & water before dressing change. May use Vaseline (petrolatum), Aquaphor, Aveeno, CeraVe, Cetaphil, Eucerin, Lubriderm, etc for dry skin. Dressing type for home: as above for the back, ischia, scrotum and RLE (minus additional triamcinolone to his back wound), three times weekly. Written discharge instructions given to patient. Follow up in 1 week.     Electronically signed by Kari Lou MD on 9/24/2019 at 1:08 PM.

## 2019-09-25 ENCOUNTER — HOSPITAL ENCOUNTER (OUTPATIENT)
Dept: WOUND CARE | Age: 54
Discharge: HOME | End: 2019-09-25
Payer: MEDICARE

## 2019-09-25 VITALS
BODY MASS INDEX: 30.42 KG/M2 | RESPIRATION RATE: 18 BRPM | DIASTOLIC BLOOD PRESSURE: 67 MMHG | SYSTOLIC BLOOD PRESSURE: 103 MMHG | HEIGHT: 74 IN | HEART RATE: 67 BPM | WEIGHT: 237 LBS | TEMPERATURE: 97.1 F

## 2019-09-25 DIAGNOSIS — L24.A9 DRAINAGE FROM WOUND: ICD-10-CM

## 2019-09-25 DIAGNOSIS — R50.9 FEVER CHILLS: Primary | ICD-10-CM

## 2019-09-25 DIAGNOSIS — T81.31XA SURGICAL WOUND DEHISCENCE, INITIAL ENCOUNTER: ICD-10-CM

## 2019-09-25 DIAGNOSIS — R73.9 HYPERGLYCEMIA: ICD-10-CM

## 2019-09-25 DIAGNOSIS — L89.324 DECUBITUS ULCER OF LEFT ISCHIUM, STAGE 4 (HCC): ICD-10-CM

## 2019-09-25 PROCEDURE — 17250 CHEM CAUT OF GRANLTJ TISSUE: CPT

## 2019-09-25 PROCEDURE — 97597 DBRDMT OPN WND 1ST 20 CM/<: CPT

## 2019-09-25 PROCEDURE — 29581 APPL MULTLAYER CMPRN SYS LEG: CPT | Performed by: INTERNAL MEDICINE

## 2019-09-25 PROCEDURE — 17250 CHEM CAUT OF GRANLTJ TISSUE: CPT | Performed by: INTERNAL MEDICINE

## 2019-09-25 PROCEDURE — 99214 OFFICE O/P EST MOD 30 MIN: CPT | Performed by: INTERNAL MEDICINE

## 2019-09-25 PROCEDURE — 11042 DBRDMT SUBQ TIS 1ST 20SQCM/<: CPT | Performed by: INTERNAL MEDICINE

## 2019-09-25 RX ORDER — LIDOCAINE 40 MG/G
CREAM TOPICAL ONCE
Status: DISCONTINUED | OUTPATIENT
Start: 2019-09-25 | End: 2019-09-26 | Stop reason: HOSPADM

## 2019-09-25 ASSESSMENT — PAIN DESCRIPTION - DIRECTION: RADIATING_TOWARDS: DENIES

## 2019-09-25 ASSESSMENT — PAIN SCALES - GENERAL
PAINLEVEL_OUTOF10: 0
PAINLEVEL_OUTOF10: 3

## 2019-09-25 ASSESSMENT — PAIN DESCRIPTION - DESCRIPTORS: DESCRIPTORS: BURNING

## 2019-09-25 ASSESSMENT — PAIN DESCRIPTION - ONSET: ONSET: ON-GOING

## 2019-09-25 ASSESSMENT — PAIN DESCRIPTION - ORIENTATION: ORIENTATION: LEFT

## 2019-09-25 ASSESSMENT — PAIN DESCRIPTION - LOCATION: LOCATION: SHOULDER

## 2019-09-25 ASSESSMENT — PAIN - FUNCTIONAL ASSESSMENT: PAIN_FUNCTIONAL_ASSESSMENT: PREVENTS OR INTERFERES SOME ACTIVE ACTIVITIES AND ADLS

## 2019-09-25 ASSESSMENT — PAIN DESCRIPTION - PAIN TYPE: TYPE: CHRONIC PAIN

## 2019-09-25 ASSESSMENT — PAIN DESCRIPTION - FREQUENCY: FREQUENCY: CONTINUOUS

## 2019-09-25 ASSESSMENT — PAIN DESCRIPTION - PROGRESSION: CLINICAL_PROGRESSION: NOT CHANGED

## 2019-09-26 RX ORDER — DOXYCYCLINE HYCLATE 100 MG/1
100 CAPSULE ORAL 2 TIMES DAILY
Qty: 28 CAPSULE | Refills: 0 | Status: SHIPPED | OUTPATIENT
Start: 2019-09-26 | End: 2019-10-10

## 2019-09-26 RX ORDER — DOCUSATE SODIUM 100 MG
CAPSULE ORAL
Qty: 60 CAPSULE | Refills: 2 | Status: SHIPPED | OUTPATIENT
Start: 2019-09-26 | End: 2019-12-23

## 2019-10-02 ENCOUNTER — HOSPITAL ENCOUNTER (OUTPATIENT)
Dept: WOUND CARE | Age: 54
Discharge: HOME | End: 2019-10-02
Payer: MEDICARE

## 2019-10-02 VITALS
HEIGHT: 74 IN | TEMPERATURE: 97.2 F | BODY MASS INDEX: 30.63 KG/M2 | RESPIRATION RATE: 18 BRPM | WEIGHT: 238.7 LBS | DIASTOLIC BLOOD PRESSURE: 73 MMHG | HEART RATE: 67 BPM | SYSTOLIC BLOOD PRESSURE: 109 MMHG

## 2019-10-02 PROCEDURE — 97597 DBRDMT OPN WND 1ST 20 CM/<: CPT

## 2019-10-02 PROCEDURE — 97597 DBRDMT OPN WND 1ST 20 CM/<: CPT | Performed by: INTERNAL MEDICINE

## 2019-10-02 PROCEDURE — 17250 CHEM CAUT OF GRANLTJ TISSUE: CPT

## 2019-10-02 PROCEDURE — 29581 APPL MULTLAYER CMPRN SYS LEG: CPT | Performed by: INTERNAL MEDICINE

## 2019-10-02 PROCEDURE — 29581 APPL MULTLAYER CMPRN SYS LEG: CPT

## 2019-10-02 PROCEDURE — 17250 CHEM CAUT OF GRANLTJ TISSUE: CPT | Performed by: INTERNAL MEDICINE

## 2019-10-02 RX ORDER — LIDOCAINE HYDROCHLORIDE 40 MG/ML
SOLUTION TOPICAL ONCE
Status: DISCONTINUED | OUTPATIENT
Start: 2019-10-02 | End: 2019-10-03 | Stop reason: HOSPADM

## 2019-10-02 ASSESSMENT — PAIN DESCRIPTION - ONSET: ONSET: ON-GOING

## 2019-10-02 ASSESSMENT — PAIN DESCRIPTION - ORIENTATION: ORIENTATION: LEFT

## 2019-10-02 ASSESSMENT — PAIN DESCRIPTION - PAIN TYPE: TYPE: CHRONIC PAIN

## 2019-10-02 ASSESSMENT — PAIN DESCRIPTION - DESCRIPTORS: DESCRIPTORS: BURNING;STABBING

## 2019-10-02 ASSESSMENT — PAIN DESCRIPTION - LOCATION: LOCATION: SHOULDER

## 2019-10-02 ASSESSMENT — PAIN - FUNCTIONAL ASSESSMENT: PAIN_FUNCTIONAL_ASSESSMENT: PREVENTS OR INTERFERES SOME ACTIVE ACTIVITIES AND ADLS

## 2019-10-02 ASSESSMENT — PAIN DESCRIPTION - FREQUENCY: FREQUENCY: CONTINUOUS

## 2019-10-03 ENCOUNTER — HOSPITAL ENCOUNTER (OUTPATIENT)
Dept: CT IMAGING | Age: 54
Discharge: HOME | End: 2019-10-03
Payer: MEDICARE

## 2019-10-03 DIAGNOSIS — L89.324 DECUBITUS ULCER OF LEFT ISCHIUM, STAGE 4 (HCC): ICD-10-CM

## 2019-10-03 DIAGNOSIS — T81.31XA SURGICAL WOUND DEHISCENCE, INITIAL ENCOUNTER: ICD-10-CM

## 2019-10-03 DIAGNOSIS — R50.9 FEVER WITH CHILLS: ICD-10-CM

## 2019-10-03 PROBLEM — R73.9 HYPERGLYCEMIA: Status: ACTIVE | Noted: 2019-10-03

## 2019-10-03 PROBLEM — L24.A9 DRAINAGE FROM WOUND: Status: ACTIVE | Noted: 2019-10-03

## 2019-10-03 PROCEDURE — 72192 CT PELVIS W/O DYE: CPT

## 2019-10-03 PROCEDURE — 72128 CT CHEST SPINE W/O DYE: CPT
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
## 2019-10-08 RX ORDER — CETIRIZINE HYDROCHLORIDE 10 MG/1
TABLET ORAL
Qty: 30 TABLET | Refills: 1 | Status: SHIPPED | OUTPATIENT
Start: 2019-10-08 | End: 2019-12-11 | Stop reason: SDUPTHER

## 2019-10-09 ENCOUNTER — HOSPITAL ENCOUNTER (OUTPATIENT)
Dept: WOUND CARE | Age: 54
Discharge: HOME | End: 2019-10-09
Payer: MEDICARE

## 2019-10-09 VITALS
SYSTOLIC BLOOD PRESSURE: 94 MMHG | DIASTOLIC BLOOD PRESSURE: 64 MMHG | TEMPERATURE: 97 F | HEART RATE: 65 BPM | WEIGHT: 242.3 LBS | RESPIRATION RATE: 18 BRPM | BODY MASS INDEX: 31.11 KG/M2

## 2019-10-09 PROCEDURE — 29581 APPL MULTLAYER CMPRN SYS LEG: CPT

## 2019-10-09 PROCEDURE — 17250 CHEM CAUT OF GRANLTJ TISSUE: CPT

## 2019-10-09 PROCEDURE — 29581 APPL MULTLAYER CMPRN SYS LEG: CPT | Performed by: INTERNAL MEDICINE

## 2019-10-09 PROCEDURE — 17250 CHEM CAUT OF GRANLTJ TISSUE: CPT | Performed by: INTERNAL MEDICINE

## 2019-10-09 ASSESSMENT — PAIN SCALES - GENERAL: PAINLEVEL_OUTOF10: 0

## 2019-10-10 PROBLEM — R73.9 HYPERGLYCEMIA: Status: RESOLVED | Noted: 2019-10-03 | Resolved: 2019-10-10

## 2019-10-10 PROBLEM — L24.A9 DRAINAGE FROM WOUND: Status: RESOLVED | Noted: 2019-10-03 | Resolved: 2019-10-10

## 2019-10-10 RX ORDER — NYSTATIN 100000 [USP'U]/G
POWDER TOPICAL
Qty: 30 G | Refills: 2 | Status: SHIPPED | OUTPATIENT
Start: 2019-10-10 | End: 2022-01-01 | Stop reason: ALTCHOICE

## 2019-10-16 ENCOUNTER — HOSPITAL ENCOUNTER (OUTPATIENT)
Dept: WOUND CARE | Age: 54
Discharge: HOME | End: 2019-10-16
Payer: MEDICARE

## 2019-10-16 VITALS
HEART RATE: 65 BPM | TEMPERATURE: 97 F | RESPIRATION RATE: 18 BRPM | DIASTOLIC BLOOD PRESSURE: 63 MMHG | SYSTOLIC BLOOD PRESSURE: 96 MMHG

## 2019-10-16 PROCEDURE — 17250 CHEM CAUT OF GRANLTJ TISSUE: CPT | Performed by: INTERNAL MEDICINE

## 2019-10-16 PROCEDURE — 29581 APPL MULTLAYER CMPRN SYS LEG: CPT

## 2019-10-16 PROCEDURE — 87077 CULTURE AEROBIC IDENTIFY: CPT

## 2019-10-16 PROCEDURE — 87205 SMEAR GRAM STAIN: CPT

## 2019-10-16 PROCEDURE — 87186 SC STD MICRODIL/AGAR DIL: CPT

## 2019-10-16 PROCEDURE — 17250 CHEM CAUT OF GRANLTJ TISSUE: CPT

## 2019-10-16 PROCEDURE — 29581 APPL MULTLAYER CMPRN SYS LEG: CPT | Performed by: INTERNAL MEDICINE

## 2019-10-16 PROCEDURE — 87070 CULTURE OTHR SPECIMN AEROBIC: CPT

## 2019-10-16 RX ORDER — LIDOCAINE HYDROCHLORIDE 40 MG/ML
SOLUTION TOPICAL ONCE
Status: DISCONTINUED | OUTPATIENT
Start: 2019-10-16 | End: 2019-10-17 | Stop reason: HOSPADM

## 2019-10-16 ASSESSMENT — PAIN DESCRIPTION - DESCRIPTORS: DESCRIPTORS: BURNING;STABBING

## 2019-10-16 ASSESSMENT — PAIN DESCRIPTION - ORIENTATION: ORIENTATION: LEFT

## 2019-10-16 ASSESSMENT — PAIN DESCRIPTION - PROGRESSION: CLINICAL_PROGRESSION: NOT CHANGED

## 2019-10-16 ASSESSMENT — PAIN DESCRIPTION - PAIN TYPE: TYPE: CHRONIC PAIN

## 2019-10-16 ASSESSMENT — PAIN DESCRIPTION - LOCATION: LOCATION: SHOULDER

## 2019-10-16 ASSESSMENT — PAIN DESCRIPTION - ONSET: ONSET: ON-GOING

## 2019-10-16 ASSESSMENT — PAIN DESCRIPTION - FREQUENCY: FREQUENCY: CONTINUOUS

## 2019-10-18 RX ORDER — CIPROFLOXACIN 500 MG/1
500 TABLET, FILM COATED ORAL EVERY 12 HOURS
Qty: 14 TABLET | Refills: 0 | Status: SHIPPED | OUTPATIENT
Start: 2019-10-18 | End: 2019-10-30 | Stop reason: ALTCHOICE

## 2019-10-20 LAB
GRAM STAIN RESULT: ABNORMAL
ORGANISM: ABNORMAL
WOUND/ABSCESS: ABNORMAL

## 2019-10-21 PROBLEM — L97.911 DIABETIC ULCER OF RIGHT LOWER LEG ASSOCIATED WITH TYPE 2 DIABETES MELLITUS, LIMITED TO BREAKDOWN OF SKIN (HCC): Status: RESOLVED | Noted: 2019-07-08 | Resolved: 2019-10-21

## 2019-10-21 PROBLEM — G45.9 TIA (TRANSIENT ISCHEMIC ATTACK): Status: RESOLVED | Noted: 2019-05-14 | Resolved: 2019-10-21

## 2019-10-21 PROBLEM — E11.622 DIABETIC ULCER OF RIGHT LOWER LEG ASSOCIATED WITH TYPE 2 DIABETES MELLITUS, LIMITED TO BREAKDOWN OF SKIN (HCC): Status: RESOLVED | Noted: 2019-07-08 | Resolved: 2019-10-21

## 2019-10-21 RX ORDER — LINEZOLID 600 MG/1
600 TABLET, FILM COATED ORAL EVERY 12 HOURS
Qty: 28 TABLET | Refills: 0 | Status: SHIPPED | OUTPATIENT
Start: 2019-10-21 | End: 2019-11-04

## 2019-10-23 ENCOUNTER — HOSPITAL ENCOUNTER (OUTPATIENT)
Dept: WOUND CARE | Age: 54
Discharge: HOME | End: 2019-10-23
Payer: MEDICARE

## 2019-10-23 VITALS
HEIGHT: 74 IN | WEIGHT: 250 LBS | HEART RATE: 63 BPM | BODY MASS INDEX: 32.08 KG/M2 | TEMPERATURE: 95.1 F | DIASTOLIC BLOOD PRESSURE: 72 MMHG | RESPIRATION RATE: 18 BRPM | SYSTOLIC BLOOD PRESSURE: 113 MMHG

## 2019-10-23 DIAGNOSIS — L03.115 CELLULITIS OF RIGHT KNEE: ICD-10-CM

## 2019-10-23 PROCEDURE — 29581 APPL MULTLAYER CMPRN SYS LEG: CPT | Performed by: INTERNAL MEDICINE

## 2019-10-23 PROCEDURE — 99213 OFFICE O/P EST LOW 20 MIN: CPT | Performed by: INTERNAL MEDICINE

## 2019-10-23 PROCEDURE — 17250 CHEM CAUT OF GRANLTJ TISSUE: CPT

## 2019-10-23 PROCEDURE — 17250 CHEM CAUT OF GRANLTJ TISSUE: CPT | Performed by: INTERNAL MEDICINE

## 2019-10-23 PROCEDURE — 29581 APPL MULTLAYER CMPRN SYS LEG: CPT

## 2019-10-23 RX ORDER — LIDOCAINE 40 MG/G
CREAM TOPICAL
Status: DISPENSED | OUTPATIENT
Start: 2019-10-23 | End: 2019-10-23

## 2019-10-23 ASSESSMENT — PAIN SCALES - GENERAL
PAINLEVEL_OUTOF10: 0
PAINLEVEL_OUTOF10: 0

## 2019-10-24 ENCOUNTER — TELEPHONE (OUTPATIENT)
Dept: INTERNAL MEDICINE CLINIC | Age: 54
End: 2019-10-24

## 2019-10-24 RX ORDER — HYDROXYZINE HYDROCHLORIDE 10 MG/1
10 TABLET, FILM COATED ORAL 2 TIMES DAILY PRN
Qty: 20 TABLET | Refills: 0 | Status: SHIPPED | OUTPATIENT
Start: 2019-10-24 | End: 2019-11-03

## 2019-10-29 PROBLEM — L03.115 CELLULITIS OF RIGHT KNEE: Status: ACTIVE | Noted: 2019-10-29

## 2019-10-29 RX ORDER — FLUCONAZOLE 100 MG/1
100 TABLET ORAL DAILY
Qty: 7 TABLET | Refills: 1 | Status: SHIPPED | OUTPATIENT
Start: 2019-10-29 | End: 2019-11-11 | Stop reason: ALTCHOICE

## 2019-10-30 ENCOUNTER — HOSPITAL ENCOUNTER (OUTPATIENT)
Dept: WOUND CARE | Age: 54
Discharge: HOME | End: 2019-10-30
Payer: MEDICARE

## 2019-10-30 VITALS
SYSTOLIC BLOOD PRESSURE: 112 MMHG | HEART RATE: 73 BPM | DIASTOLIC BLOOD PRESSURE: 72 MMHG | HEIGHT: 74 IN | RESPIRATION RATE: 20 BRPM | WEIGHT: 243.4 LBS | BODY MASS INDEX: 31.24 KG/M2 | TEMPERATURE: 97 F

## 2019-10-30 PROCEDURE — 29581 APPL MULTLAYER CMPRN SYS LEG: CPT | Performed by: INTERNAL MEDICINE

## 2019-10-30 PROCEDURE — 17250 CHEM CAUT OF GRANLTJ TISSUE: CPT

## 2019-10-30 PROCEDURE — 17250 CHEM CAUT OF GRANLTJ TISSUE: CPT | Performed by: INTERNAL MEDICINE

## 2019-10-30 PROCEDURE — 29581 APPL MULTLAYER CMPRN SYS LEG: CPT

## 2019-10-30 RX ORDER — LIDOCAINE 40 MG/G
CREAM TOPICAL ONCE
Status: DISCONTINUED | OUTPATIENT
Start: 2019-10-30 | End: 2019-10-31 | Stop reason: HOSPADM

## 2019-10-30 ASSESSMENT — PAIN SCALES - GENERAL: PAINLEVEL_OUTOF10: 0

## 2019-11-03 DIAGNOSIS — E11.622 TYPE 2 DIABETES MELLITUS WITH OTHER SKIN ULCER, WITH LONG-TERM CURRENT USE OF INSULIN (HCC): ICD-10-CM

## 2019-11-03 DIAGNOSIS — Z79.4 TYPE 2 DIABETES MELLITUS WITH OTHER SKIN ULCER, WITH LONG-TERM CURRENT USE OF INSULIN (HCC): ICD-10-CM

## 2019-11-04 RX ORDER — INSULIN GLARGINE 100 [IU]/ML
INJECTION, SOLUTION SUBCUTANEOUS
Qty: 30 ML | Refills: 0 | Status: SHIPPED | OUTPATIENT
Start: 2019-11-04 | End: 2020-01-08 | Stop reason: SDUPTHER

## 2019-11-05 PROBLEM — L89.324 PRESSURE INJURY OF LEFT ISCHIUM, STAGE 4 (HCC): Status: RESOLVED | Noted: 2019-03-05 | Resolved: 2019-11-05

## 2019-11-06 ENCOUNTER — HOSPITAL ENCOUNTER (OUTPATIENT)
Dept: WOUND CARE | Age: 54
Discharge: HOME | End: 2019-11-06
Payer: MEDICARE

## 2019-11-06 VITALS
TEMPERATURE: 96.6 F | HEIGHT: 74 IN | DIASTOLIC BLOOD PRESSURE: 63 MMHG | HEART RATE: 64 BPM | RESPIRATION RATE: 20 BRPM | SYSTOLIC BLOOD PRESSURE: 96 MMHG | BODY MASS INDEX: 31.25 KG/M2

## 2019-11-06 DIAGNOSIS — S81.812A NONINFECTED SKIN TEAR OF LEFT LEG, INITIAL ENCOUNTER: ICD-10-CM

## 2019-11-06 PROCEDURE — 29581 APPL MULTLAYER CMPRN SYS LEG: CPT

## 2019-11-06 PROCEDURE — 29581 APPL MULTLAYER CMPRN SYS LEG: CPT | Performed by: INTERNAL MEDICINE

## 2019-11-06 RX ORDER — LIDOCAINE HYDROCHLORIDE 40 MG/ML
SOLUTION TOPICAL ONCE
Status: DISCONTINUED | OUTPATIENT
Start: 2019-11-06 | End: 2019-11-07 | Stop reason: HOSPADM

## 2019-11-06 RX ORDER — LISINOPRIL 5 MG/1
5 TABLET ORAL DAILY
COMMUNITY
End: 2020-04-06 | Stop reason: SDUPTHER

## 2019-11-06 ASSESSMENT — PAIN SCALES - GENERAL: PAINLEVEL_OUTOF10: 0

## 2019-11-11 PROBLEM — S81.812A NONINFECTED SKIN TEAR OF LEFT LEG, INITIAL ENCOUNTER: Status: ACTIVE | Noted: 2019-11-11

## 2019-11-13 ENCOUNTER — HOSPITAL ENCOUNTER (OUTPATIENT)
Dept: WOUND CARE | Age: 54
Discharge: HOME | End: 2019-11-13
Payer: MEDICARE

## 2019-11-13 VITALS
DIASTOLIC BLOOD PRESSURE: 42 MMHG | SYSTOLIC BLOOD PRESSURE: 80 MMHG | RESPIRATION RATE: 18 BRPM | WEIGHT: 242 LBS | BODY MASS INDEX: 31.07 KG/M2 | TEMPERATURE: 97.5 F | HEART RATE: 67 BPM

## 2019-11-13 PROCEDURE — 29581 APPL MULTLAYER CMPRN SYS LEG: CPT | Performed by: INTERNAL MEDICINE

## 2019-11-13 PROCEDURE — 97597 DBRDMT OPN WND 1ST 20 CM/<: CPT

## 2019-11-13 PROCEDURE — 29581 APPL MULTLAYER CMPRN SYS LEG: CPT

## 2019-11-13 PROCEDURE — 97597 DBRDMT OPN WND 1ST 20 CM/<: CPT | Performed by: INTERNAL MEDICINE

## 2019-11-13 RX ORDER — LIDOCAINE HYDROCHLORIDE 40 MG/ML
SOLUTION TOPICAL ONCE
Status: DISCONTINUED | OUTPATIENT
Start: 2019-11-13 | End: 2019-11-14 | Stop reason: HOSPADM

## 2019-11-13 ASSESSMENT — PAIN SCALES - GENERAL: PAINLEVEL_OUTOF10: 0

## 2019-11-18 PROBLEM — L03.115 CELLULITIS OF RIGHT KNEE: Status: RESOLVED | Noted: 2019-10-29 | Resolved: 2019-11-18

## 2019-11-18 RX ORDER — TORSEMIDE 20 MG/1
TABLET ORAL
Qty: 180 TABLET | Refills: 0 | Status: SHIPPED | OUTPATIENT
Start: 2019-11-18 | End: 2020-04-06

## 2019-11-20 ENCOUNTER — HOSPITAL ENCOUNTER (OUTPATIENT)
Dept: WOUND CARE | Age: 54
Discharge: HOME | End: 2019-11-20
Payer: MEDICARE

## 2019-11-20 VITALS
HEIGHT: 74 IN | BODY MASS INDEX: 31.34 KG/M2 | WEIGHT: 244.2 LBS | TEMPERATURE: 97 F | SYSTOLIC BLOOD PRESSURE: 104 MMHG | HEART RATE: 70 BPM | RESPIRATION RATE: 18 BRPM | DIASTOLIC BLOOD PRESSURE: 70 MMHG

## 2019-11-20 PROCEDURE — 29445 APPL RIGID TOT CNTC LEG CAST: CPT

## 2019-11-20 PROCEDURE — 29445 APPL RIGID TOT CNTC LEG CAST: CPT | Performed by: INTERNAL MEDICINE

## 2019-11-20 PROCEDURE — 29581 APPL MULTLAYER CMPRN SYS LEG: CPT | Performed by: INTERNAL MEDICINE

## 2019-11-20 PROCEDURE — 29581 APPL MULTLAYER CMPRN SYS LEG: CPT

## 2019-11-20 RX ORDER — FLUCONAZOLE 200 MG/1
200 TABLET ORAL DAILY
Qty: 7 TABLET | Refills: 0 | Status: SHIPPED | OUTPATIENT
Start: 2019-11-20 | End: 2019-11-27

## 2019-11-20 RX ORDER — LIDOCAINE 40 MG/G
CREAM TOPICAL ONCE
Status: DISCONTINUED | OUTPATIENT
Start: 2019-11-20 | End: 2019-11-21 | Stop reason: HOSPADM

## 2019-11-20 ASSESSMENT — PAIN SCALES - GENERAL: PAINLEVEL_OUTOF10: 0

## 2019-11-27 ENCOUNTER — OFFICE VISIT (OUTPATIENT)
Dept: INTERNAL MEDICINE CLINIC | Age: 54
End: 2019-11-27

## 2019-11-27 ENCOUNTER — HOSPITAL ENCOUNTER (OUTPATIENT)
Dept: WOUND CARE | Age: 54
Discharge: HOME | End: 2019-11-27
Payer: MEDICARE

## 2019-11-27 VITALS — DIASTOLIC BLOOD PRESSURE: 58 MMHG | SYSTOLIC BLOOD PRESSURE: 89 MMHG | RESPIRATION RATE: 18 BRPM | TEMPERATURE: 98.3 F

## 2019-11-27 VITALS
BODY MASS INDEX: 31.35 KG/M2 | HEART RATE: 70 BPM | SYSTOLIC BLOOD PRESSURE: 105 MMHG | HEIGHT: 74 IN | RESPIRATION RATE: 18 BRPM | DIASTOLIC BLOOD PRESSURE: 75 MMHG

## 2019-11-27 DIAGNOSIS — Z79.4 CONTROLLED TYPE 2 DIABETES MELLITUS WITH FOOT ULCER, WITH LONG-TERM CURRENT USE OF INSULIN (HCC): ICD-10-CM

## 2019-11-27 DIAGNOSIS — L89.314 PRESSURE INJURY OF RIGHT ISCHIUM, STAGE 4 (HCC): ICD-10-CM

## 2019-11-27 DIAGNOSIS — E78.2 MIXED HYPERLIPIDEMIA: Chronic | ICD-10-CM

## 2019-11-27 DIAGNOSIS — E11.622 DIABETIC ULCER OF RIGHT LOWER LEG ASSOCIATED WITH TYPE 2 DIABETES MELLITUS, WITH NECROSIS OF MUSCLE (HCC): ICD-10-CM

## 2019-11-27 DIAGNOSIS — E11.621 CONTROLLED TYPE 2 DIABETES MELLITUS WITH FOOT ULCER, WITH LONG-TERM CURRENT USE OF INSULIN (HCC): ICD-10-CM

## 2019-11-27 DIAGNOSIS — D50.0 IRON DEFICIENCY ANEMIA DUE TO CHRONIC BLOOD LOSS: ICD-10-CM

## 2019-11-27 DIAGNOSIS — L97.509 CONTROLLED TYPE 2 DIABETES MELLITUS WITH FOOT ULCER, WITH LONG-TERM CURRENT USE OF INSULIN (HCC): ICD-10-CM

## 2019-11-27 DIAGNOSIS — N31.9 NEUROGENIC BLADDER: ICD-10-CM

## 2019-11-27 DIAGNOSIS — I25.5 ISCHEMIC CARDIOMYOPATHY: Chronic | ICD-10-CM

## 2019-11-27 DIAGNOSIS — L97.913 DIABETIC ULCER OF RIGHT LOWER LEG ASSOCIATED WITH TYPE 2 DIABETES MELLITUS, WITH NECROSIS OF MUSCLE (HCC): ICD-10-CM

## 2019-11-27 DIAGNOSIS — I25.10 CORONARY ARTERY DISEASE INVOLVING NATIVE CORONARY ARTERY OF NATIVE HEART WITHOUT ANGINA PECTORIS: Primary | Chronic | ICD-10-CM

## 2019-11-27 DIAGNOSIS — E87.6 GITELMAN SYNDROME: Chronic | ICD-10-CM

## 2019-11-27 DIAGNOSIS — G82.21 PARAPLEGIA, COMPLETE (HCC): ICD-10-CM

## 2019-11-27 DIAGNOSIS — E83.42 GITELMAN SYNDROME: Chronic | ICD-10-CM

## 2019-11-27 PROBLEM — L60.3 DYSTROPHIC NAIL: Status: RESOLVED | Noted: 2017-07-10 | Resolved: 2019-11-27

## 2019-11-27 LAB
A/G RATIO: 0.8 (ref 1.1–2.2)
ALBUMIN SERPL-MCNC: 4 G/DL (ref 3.4–5)
ALP BLD-CCNC: 118 U/L (ref 40–129)
ALT SERPL-CCNC: 15 U/L (ref 10–40)
ANION GAP SERPL CALCULATED.3IONS-SCNC: 20 MMOL/L (ref 3–16)
AST SERPL-CCNC: 23 U/L (ref 15–37)
BASOPHILS ABSOLUTE: 0.1 K/UL (ref 0–0.2)
BASOPHILS RELATIVE PERCENT: 0.8 %
BILIRUB SERPL-MCNC: 0.3 MG/DL (ref 0–1)
BUN BLDV-MCNC: 79 MG/DL (ref 7–20)
CALCIUM SERPL-MCNC: 10 MG/DL (ref 8.3–10.6)
CHLORIDE BLD-SCNC: 85 MMOL/L (ref 99–110)
CO2: 23 MMOL/L (ref 21–32)
CREAT SERPL-MCNC: 1.5 MG/DL (ref 0.9–1.3)
EOSINOPHILS ABSOLUTE: 0.4 K/UL (ref 0–0.6)
EOSINOPHILS RELATIVE PERCENT: 3.6 %
GFR AFRICAN AMERICAN: 59
GFR NON-AFRICAN AMERICAN: 49
GLOBULIN: 4.9 G/DL
GLUCOSE BLD-MCNC: 68 MG/DL (ref 70–99)
HCT VFR BLD CALC: 36.1 % (ref 40.5–52.5)
HEMOGLOBIN: 11.7 G/DL (ref 13.5–17.5)
LYMPHOCYTES ABSOLUTE: 1.3 K/UL (ref 1–5.1)
LYMPHOCYTES RELATIVE PERCENT: 11.9 %
MCH RBC QN AUTO: 26.4 PG (ref 26–34)
MCHC RBC AUTO-ENTMCNC: 32.5 G/DL (ref 31–36)
MCV RBC AUTO: 81.3 FL (ref 80–100)
MONOCYTES ABSOLUTE: 1.1 K/UL (ref 0–1.3)
MONOCYTES RELATIVE PERCENT: 10.8 %
NEUTROPHILS ABSOLUTE: 7.7 K/UL (ref 1.7–7.7)
NEUTROPHILS RELATIVE PERCENT: 72.9 %
PDW BLD-RTO: 18.7 % (ref 12.4–15.4)
PLATELET # BLD: 289 K/UL (ref 135–450)
PMV BLD AUTO: 10 FL (ref 5–10.5)
POTASSIUM SERPL-SCNC: 4.9 MMOL/L (ref 3.5–5.1)
RBC # BLD: 4.43 M/UL (ref 4.2–5.9)
SODIUM BLD-SCNC: 128 MMOL/L (ref 136–145)
TOTAL PROTEIN: 8.9 G/DL (ref 6.4–8.2)
WBC # BLD: 10.6 K/UL (ref 4–11)

## 2019-11-27 PROCEDURE — 29581 APPL MULTLAYER CMPRN SYS LEG: CPT

## 2019-11-27 PROCEDURE — 29581 APPL MULTLAYER CMPRN SYS LEG: CPT | Performed by: INTERNAL MEDICINE

## 2019-11-27 PROCEDURE — 3017F COLORECTAL CA SCREEN DOC REV: CPT | Performed by: INTERNAL MEDICINE

## 2019-11-27 PROCEDURE — G8417 CALC BMI ABV UP PARAM F/U: HCPCS | Performed by: INTERNAL MEDICINE

## 2019-11-27 PROCEDURE — 99214 OFFICE O/P EST MOD 30 MIN: CPT | Performed by: INTERNAL MEDICINE

## 2019-11-27 PROCEDURE — 2022F DILAT RTA XM EVC RTNOPTHY: CPT | Performed by: INTERNAL MEDICINE

## 2019-11-27 PROCEDURE — 29445 APPL RIGID TOT CNTC LEG CAST: CPT

## 2019-11-27 PROCEDURE — 1036F TOBACCO NON-USER: CPT | Performed by: INTERNAL MEDICINE

## 2019-11-27 PROCEDURE — 17250 CHEM CAUT OF GRANLTJ TISSUE: CPT | Performed by: INTERNAL MEDICINE

## 2019-11-27 PROCEDURE — G8484 FLU IMMUNIZE NO ADMIN: HCPCS | Performed by: INTERNAL MEDICINE

## 2019-11-27 PROCEDURE — G8598 ASA/ANTIPLAT THER USED: HCPCS | Performed by: INTERNAL MEDICINE

## 2019-11-27 PROCEDURE — 29445 APPL RIGID TOT CNTC LEG CAST: CPT | Performed by: INTERNAL MEDICINE

## 2019-11-27 PROCEDURE — 17250 CHEM CAUT OF GRANLTJ TISSUE: CPT

## 2019-11-27 PROCEDURE — G8427 DOCREV CUR MEDS BY ELIG CLIN: HCPCS | Performed by: INTERNAL MEDICINE

## 2019-11-27 RX ORDER — INSULIN LISPRO 100 [IU]/ML
15 INJECTION, SOLUTION INTRAVENOUS; SUBCUTANEOUS
Qty: 5 PEN | Refills: 0 | Status: SHIPPED | OUTPATIENT
Start: 2019-11-27 | End: 2019-12-16 | Stop reason: SDUPTHER

## 2019-11-27 RX ORDER — LIDOCAINE HYDROCHLORIDE 40 MG/ML
SOLUTION TOPICAL ONCE
Status: DISCONTINUED | OUTPATIENT
Start: 2019-11-27 | End: 2019-11-28 | Stop reason: HOSPADM

## 2019-11-27 ASSESSMENT — ENCOUNTER SYMPTOMS
RHINORRHEA: 0
SHORTNESS OF BREATH: 0
CHEST TIGHTNESS: 0
COLOR CHANGE: 0

## 2019-11-27 ASSESSMENT — PAIN SCALES - GENERAL: PAINLEVEL_OUTOF10: 0

## 2019-11-28 LAB
ESTIMATED AVERAGE GLUCOSE: 157.1 MG/DL
HBA1C MFR BLD: 7.1 %

## 2019-12-03 DIAGNOSIS — M62.838 MUSCLE SPASM: ICD-10-CM

## 2019-12-03 DIAGNOSIS — R79.89 CREATININE ELEVATION: Primary | ICD-10-CM

## 2019-12-03 RX ORDER — SENNOSIDES 8.6 MG
TABLET ORAL
Qty: 180 TABLET | Refills: 0 | Status: SHIPPED | OUTPATIENT
Start: 2019-12-03 | End: 2020-02-27

## 2019-12-04 ENCOUNTER — HOSPITAL ENCOUNTER (OUTPATIENT)
Dept: WOUND CARE | Age: 54
Discharge: HOME | End: 2019-12-04
Payer: MEDICARE

## 2019-12-04 VITALS
HEIGHT: 74 IN | TEMPERATURE: 97.6 F | DIASTOLIC BLOOD PRESSURE: 60 MMHG | SYSTOLIC BLOOD PRESSURE: 98 MMHG | RESPIRATION RATE: 18 BRPM | HEART RATE: 71 BPM | BODY MASS INDEX: 31.35 KG/M2

## 2019-12-04 PROCEDURE — 29445 APPL RIGID TOT CNTC LEG CAST: CPT

## 2019-12-04 PROCEDURE — 29445 APPL RIGID TOT CNTC LEG CAST: CPT | Performed by: INTERNAL MEDICINE

## 2019-12-04 PROCEDURE — 17250 CHEM CAUT OF GRANLTJ TISSUE: CPT | Performed by: INTERNAL MEDICINE

## 2019-12-04 PROCEDURE — 29581 APPL MULTLAYER CMPRN SYS LEG: CPT

## 2019-12-04 PROCEDURE — 17250 CHEM CAUT OF GRANLTJ TISSUE: CPT

## 2019-12-04 PROCEDURE — 29581 APPL MULTLAYER CMPRN SYS LEG: CPT | Performed by: INTERNAL MEDICINE

## 2019-12-04 RX ORDER — LIDOCAINE HYDROCHLORIDE 40 MG/ML
SOLUTION TOPICAL ONCE
Status: DISCONTINUED | OUTPATIENT
Start: 2019-12-04 | End: 2019-12-05 | Stop reason: HOSPADM

## 2019-12-04 ASSESSMENT — PAIN SCALES - GENERAL: PAINLEVEL_OUTOF10: 0

## 2019-12-11 ENCOUNTER — HOSPITAL ENCOUNTER (OUTPATIENT)
Dept: WOUND CARE | Age: 54
Discharge: HOME | End: 2019-12-11
Payer: MEDICARE

## 2019-12-11 VITALS
HEIGHT: 74 IN | RESPIRATION RATE: 18 BRPM | SYSTOLIC BLOOD PRESSURE: 121 MMHG | BODY MASS INDEX: 31.32 KG/M2 | TEMPERATURE: 97.9 F | WEIGHT: 244 LBS | HEART RATE: 67 BPM | DIASTOLIC BLOOD PRESSURE: 75 MMHG

## 2019-12-11 PROCEDURE — 29445 APPL RIGID TOT CNTC LEG CAST: CPT

## 2019-12-11 PROCEDURE — 29581 APPL MULTLAYER CMPRN SYS LEG: CPT

## 2019-12-11 PROCEDURE — 17250 CHEM CAUT OF GRANLTJ TISSUE: CPT | Performed by: INTERNAL MEDICINE

## 2019-12-11 PROCEDURE — 29445 APPL RIGID TOT CNTC LEG CAST: CPT | Performed by: INTERNAL MEDICINE

## 2019-12-11 PROCEDURE — 17250 CHEM CAUT OF GRANLTJ TISSUE: CPT

## 2019-12-11 PROCEDURE — 29581 APPL MULTLAYER CMPRN SYS LEG: CPT | Performed by: INTERNAL MEDICINE

## 2019-12-11 RX ORDER — CETIRIZINE HYDROCHLORIDE 10 MG/1
TABLET ORAL
Qty: 30 TABLET | Refills: 2 | Status: SHIPPED | OUTPATIENT
Start: 2019-12-11 | End: 2022-01-01 | Stop reason: ALTCHOICE

## 2019-12-11 RX ORDER — LIDOCAINE 40 MG/G
CREAM TOPICAL ONCE
Status: DISCONTINUED | OUTPATIENT
Start: 2019-12-11 | End: 2019-12-12 | Stop reason: HOSPADM

## 2019-12-11 ASSESSMENT — PAIN SCALES - GENERAL: PAINLEVEL_OUTOF10: 0

## 2019-12-16 PROBLEM — L97.912 DIABETIC ULCER OF RIGHT LOWER LEG ASSOCIATED WITH TYPE 2 DIABETES MELLITUS, WITH FAT LAYER EXPOSED (HCC): Status: ACTIVE | Noted: 2019-05-01

## 2019-12-16 RX ORDER — INSULIN LISPRO 100 [IU]/ML
15 INJECTION, SOLUTION INTRAVENOUS; SUBCUTANEOUS
Qty: 5 PEN | Refills: 0 | Status: SHIPPED | OUTPATIENT
Start: 2019-12-16 | End: 2020-01-23 | Stop reason: ALTCHOICE

## 2019-12-18 ENCOUNTER — HOSPITAL ENCOUNTER (OUTPATIENT)
Dept: WOUND CARE | Age: 54
Discharge: HOME | End: 2019-12-18
Payer: MEDICARE

## 2019-12-18 VITALS
HEART RATE: 70 BPM | BODY MASS INDEX: 32.18 KG/M2 | WEIGHT: 250.7 LBS | RESPIRATION RATE: 18 BRPM | SYSTOLIC BLOOD PRESSURE: 95 MMHG | HEIGHT: 74 IN | DIASTOLIC BLOOD PRESSURE: 61 MMHG | TEMPERATURE: 97.1 F

## 2019-12-18 PROCEDURE — 29445 APPL RIGID TOT CNTC LEG CAST: CPT | Performed by: INTERNAL MEDICINE

## 2019-12-18 PROCEDURE — 97597 DBRDMT OPN WND 1ST 20 CM/<: CPT

## 2019-12-18 PROCEDURE — 29581 APPL MULTLAYER CMPRN SYS LEG: CPT

## 2019-12-18 PROCEDURE — 97597 DBRDMT OPN WND 1ST 20 CM/<: CPT | Performed by: INTERNAL MEDICINE

## 2019-12-18 PROCEDURE — 17250 CHEM CAUT OF GRANLTJ TISSUE: CPT

## 2019-12-18 PROCEDURE — 29445 APPL RIGID TOT CNTC LEG CAST: CPT

## 2019-12-18 PROCEDURE — 17250 CHEM CAUT OF GRANLTJ TISSUE: CPT | Performed by: INTERNAL MEDICINE

## 2019-12-18 RX ORDER — LINEZOLID 600 MG/1
600 TABLET, FILM COATED ORAL EVERY 12 HOURS
Qty: 10 TABLET | Refills: 0 | Status: SHIPPED | OUTPATIENT
Start: 2019-12-18 | End: 2019-12-26

## 2019-12-18 RX ORDER — CIPROFLOXACIN 750 MG/1
750 TABLET, FILM COATED ORAL EVERY 12 HOURS
Qty: 10 TABLET | Refills: 0 | Status: SHIPPED | OUTPATIENT
Start: 2019-12-18 | End: 2019-12-26

## 2019-12-18 RX ORDER — LIDOCAINE 50 MG/G
OINTMENT TOPICAL ONCE
Status: DISCONTINUED | OUTPATIENT
Start: 2019-12-18 | End: 2019-12-19 | Stop reason: HOSPADM

## 2019-12-18 ASSESSMENT — PAIN SCALES - GENERAL: PAINLEVEL_OUTOF10: 0

## 2019-12-23 RX ORDER — DOCUSATE SODIUM 100 MG
CAPSULE ORAL
Qty: 60 CAPSULE | Refills: 1 | Status: SHIPPED | OUTPATIENT
Start: 2019-12-23 | End: 2020-02-27

## 2019-12-26 ENCOUNTER — HOSPITAL ENCOUNTER (OUTPATIENT)
Dept: WOUND CARE | Age: 54
Discharge: HOME | End: 2019-12-26
Payer: MEDICARE

## 2019-12-26 VITALS
BODY MASS INDEX: 33.09 KG/M2 | RESPIRATION RATE: 18 BRPM | HEART RATE: 78 BPM | DIASTOLIC BLOOD PRESSURE: 69 MMHG | WEIGHT: 257.8 LBS | SYSTOLIC BLOOD PRESSURE: 111 MMHG | TEMPERATURE: 97.8 F | HEIGHT: 74 IN

## 2019-12-26 PROBLEM — S81.812A NONINFECTED SKIN TEAR OF LEFT LEG, INITIAL ENCOUNTER: Status: RESOLVED | Noted: 2019-11-11 | Resolved: 2019-12-26

## 2019-12-26 PROCEDURE — 29581 APPL MULTLAYER CMPRN SYS LEG: CPT | Performed by: INTERNAL MEDICINE

## 2019-12-26 PROCEDURE — 29581 APPL MULTLAYER CMPRN SYS LEG: CPT

## 2019-12-26 PROCEDURE — 17250 CHEM CAUT OF GRANLTJ TISSUE: CPT

## 2019-12-26 PROCEDURE — 17250 CHEM CAUT OF GRANLTJ TISSUE: CPT | Performed by: INTERNAL MEDICINE

## 2019-12-26 PROCEDURE — 29445 APPL RIGID TOT CNTC LEG CAST: CPT | Performed by: INTERNAL MEDICINE

## 2019-12-26 PROCEDURE — 29445 APPL RIGID TOT CNTC LEG CAST: CPT

## 2019-12-26 RX ORDER — LIDOCAINE 40 MG/G
CREAM TOPICAL ONCE
Status: DISCONTINUED | OUTPATIENT
Start: 2019-12-26 | End: 2019-12-27 | Stop reason: HOSPADM

## 2019-12-26 ASSESSMENT — PAIN SCALES - GENERAL: PAINLEVEL_OUTOF10: 0

## 2020-01-02 ENCOUNTER — HOSPITAL ENCOUNTER (OUTPATIENT)
Dept: WOUND CARE | Age: 55
Discharge: HOME | End: 2020-01-02
Payer: MEDICARE

## 2020-01-02 VITALS
HEIGHT: 74 IN | RESPIRATION RATE: 16 BRPM | BODY MASS INDEX: 32.98 KG/M2 | HEART RATE: 74 BPM | WEIGHT: 257 LBS | TEMPERATURE: 97.5 F | SYSTOLIC BLOOD PRESSURE: 98 MMHG | DIASTOLIC BLOOD PRESSURE: 57 MMHG

## 2020-01-02 PROCEDURE — 29581 APPL MULTLAYER CMPRN SYS LEG: CPT

## 2020-01-02 PROCEDURE — 29581 APPL MULTLAYER CMPRN SYS LEG: CPT | Performed by: INTERNAL MEDICINE

## 2020-01-02 PROCEDURE — 99213 OFFICE O/P EST LOW 20 MIN: CPT | Performed by: INTERNAL MEDICINE

## 2020-01-02 RX ORDER — LIDOCAINE 40 MG/G
CREAM TOPICAL ONCE
Status: DISCONTINUED | OUTPATIENT
Start: 2020-01-02 | End: 2020-01-03 | Stop reason: HOSPADM

## 2020-01-02 RX ORDER — METOPROLOL SUCCINATE 100 MG/1
TABLET, EXTENDED RELEASE ORAL
Qty: 90 TABLET | Refills: 0 | Status: SHIPPED | OUTPATIENT
Start: 2020-01-02 | End: 2020-01-03 | Stop reason: SDUPTHER

## 2020-01-02 ASSESSMENT — PAIN DESCRIPTION - DIRECTION: RADIATING_TOWARDS: DENIES

## 2020-01-02 ASSESSMENT — PAIN SCALES - GENERAL: PAINLEVEL_OUTOF10: 0

## 2020-01-02 NOTE — PLAN OF CARE
No wound debridement to wounds today, Dr. Mendez Garcia orders same meds to each wound except now patient will not have a TCC applied again this week, Continue to offload wounds as ordered, f/u x 1 week, MD orders/D/C instructions reviewed with patient, all questions answered; copy of instructions given to patient

## 2020-01-03 ENCOUNTER — TELEPHONE (OUTPATIENT)
Dept: INTERNAL MEDICINE CLINIC | Age: 55
End: 2020-01-03

## 2020-01-03 RX ORDER — METOPROLOL SUCCINATE 100 MG/1
TABLET, EXTENDED RELEASE ORAL
Qty: 90 TABLET | Refills: 0 | Status: SHIPPED | OUTPATIENT
Start: 2020-01-03 | End: 2020-05-15 | Stop reason: SDUPTHER

## 2020-01-03 NOTE — TELEPHONE ENCOUNTER
----- Message from Deedee Marquez sent at 1/3/2020  9:53 AM EST -----  Contact: [phone]  Pt called because pharmacy did not get a refill for metoprolol.      Carolina Montero [address] [place] [address]

## 2020-01-03 NOTE — PROGRESS NOTES
[facility] Progress Note    Kay Santos     : 1965    DATE OF VISIT:  2020    Subjective:     Kay Santos is a 48 y.o. male who has a diabetic  ulcer located on the right knee, foot (left , heel) and left, great toe. Current complaint of pain in this ulcer? no.    Other significant symptoms or pertinent ulcer history: right knee again looks more irritated, with some periwound redness and bruising; this had been present weeks ago, then I thought was getting better with TCCs to protect the knee from his bed and other lower extremity, but it's increased this past week, no accompanying F/C/D, stable moderate bloody drainage, no wound malodor. Unrelated, he also noted a new area of rash on his left posterior thigh this past week, pretty sizeable, red, moist, draining some brownish exudate. He's placed Nystatin powder and a nonadherent dressing for a few days. Otherwise feeling ok, no CP or SOB, eating well, no unexplained hyperglycemia, no diarrhea after recent Abx. Additional ulcer(s) noted? yes. T-spine and right ischium. Left ischium delicately healed this week.      Mr. Chely Thompson has a past medical history of Acute blood loss anemia, Acute MI ([address] Acute on chronic systolic CHF (congestive heart failure) ([address] Acute osteomyelitis of left foot ([address] Bloodstream infection due to Port-A-Cath, CAD (coronary artery disease), Candidal dermatitis, Cellulitis and abscess of left leg, except foot, Cellulitis of right buttock, Cellulitis of right knee, Chronic osteomyelitis of left foot ([address] Chronic osteomyelitis of left ischium ([address] Chronic osteomyelitis of right foot with draining sinus ([address] COPD (chronic obstructive pulmonary disease) ([address] Decubitus ulcer of left ischium, stage 4 ([address] Diabetes mellitus ([address] Diabetic foot ulcer with osteomyelitis ([address] Discitis of lumbosacral region, DVT of lower extremity, bilateral ([address] ESBL (extended NAD  Psychiatric:  oriented to person, place and time; mood and affect appropriate for the situation   Eyes:  pupils equal, round and reactive to light; sclerae anicteric, conjunctivae not pale  Cardiovascular:  Left pedal pulses palpable, left foot and right BK stump warm with good cap refill; mild-mod BL lower extremity stage 2 lymphedema  Lymphatic:  no inguinal or popliteal adenopathy, no angitis or cellulitis  Musculoskeletal:  no clubbing, cyanosis or petechiae; RLE and LLE with no gross effusions, joint misalignment or acute arthritis; stable contracture of his right knee, with about 10-20 degrees of flexion  Chetna-ulcer skin: generally pink and indurated, with some mild hyperkeratosis at left heel, minimal erythema at T-spine, and then the ecchymotic and irritated looking skin at right knee. Posterior left thigh with a large (10-15 cm) ovoid patch of dermatitis, quite dry today, some scaling, some small satellite papules and macules. Ulcer(s): T-spine with stable small areas of inflamed granulation, moderate drainage, exposed hardware; left ischium delicately healed; right ischium very small, red, granular, fragile but healthy; right knee dark red, a bit smaller, fleshy and delicate, not as hypergranular though; right BK stump very delicately healed; left leg healed but fragile and hyperkeratotic; left great toe smaller, drier, some residual AgNO3 material; heel very small, clean, red, granular, modest drainage. Photos also saved in electronic chart.     Today's Wound Measurements, per RN documentation:  Wound 04/10/19 #49, Right Knee Anterior - CLUSTER, Garrick BLANC 2, (onset 2018), gradually appeared-Wound Length (cm): 1.7 cm  Wound 12/11/19 #56, Left Great  Toe, Traumatic Injury, Partial Thickness, Onset 12/6/19-Wound Length (cm): 1.4 cm  Wound 12/11/19 #57, Left Heel, Traumatic Injury, Partial Thickness, Onset 12/6/19-Wound Length (cm): 0.5 cm  [REMOVED] Wound 01/23/19 #45, Left Ischium, Pressure, Stage Results   Component Value Date    LABA1C 7.1 11/27/2019     Assessment:     Patient Active Problem List   Diagnosis Code    Mixed hyperlipidemia E78.2    Coronary artery disease involving native coronary artery of native heart without angina pectoris I25.10    Paraplegia, complete ([facility]) G82.21    Chronic back pain M54.9, G89.29    Arthritis M19.90    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 7XXA    Candidal dermatitis B37.2    Iron deficiency anemia due to chronic blood loss D50.0    Lymphedema of both lower extremities I89.0    Neurogenic bladder N31.9    Neurogenic bowel K59.2    Pressure ulcer of right ischium, stage 4 ([facility]) L89.314    Hypergranulation L92.9    Dehiscence of surgical wound of T-spine T81.31XA    Onychomycosis B35.1    Diabetic ulcer of left heel associated with type 2 diabetes mellitus, limited to breakdown of skin ([facility]) E11.621, L97.421    Ischemic cardiomyopathy I25.5    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 Presumed chronic hardware infection and osteomyelitis at T-spine. Ischial ulcers doing well, left foot should do well. Right knee a bit worse this week, and maybe the TCCs are placing too much pressure on that knee, so will try another strategy for protection there this week. Left thigh rash looks fungal, though it is a bit unusual for it to be so large and well-localized, in an area that shouldn't really be excessively moist. Factors contributing to occurrence and/or persistence of the chronic ulcer include lymphedema, diabetes, chronic pressure, decreased mobility and shear force.  Medical necessity of today's visit is shown by the above documentation. Sharp debridement is not indicated today, based upon the exam findings in the wound(s) above. Discharge plan:     Treatment in the wound care center today, per RN documentation: Wound 04/10/19 #49, Right Knee Anterior - CLUSTER, DWLE, [place] [address], (onset 2018), gradually appeared-Dressing/Treatment: Other (comment)(vashe,calmoseptine,opticell ag)  Wound 12/11/19 #56, Left Great  Toe, Traumatic Injury, Partial Thickness, Onset 12/6/19-Dressing/Treatment: Other (comment)(vashe,hydrogel,mepilex)  Wound 12/11/19 #57, Left Heel, Traumatic Injury, Partial Thickness, Onset 12/6/19-Dressing/Treatment: Other (comment)(vashe,hydrogel,4x4,co flex TLC)  Wound 08/16/17 #27- Thoracic spine, dehisced surgical wound, full thickness, onset 8/16/2017, pressure-Dressing/Treatment: Other (comment)(vashe,calmospetine,opticel ag,drawtex,mepilex)  Wound 10/17/14 #8, right ischium, stage 4 PU, onset 7/15/14 (merged with #30), pressure-Dressing/Treatment: Other (comment)(PSO,opticell ag,calmoseptine). Per physician order, left application of multilayer compression wrap was performed in the wound care center today, to help manage edema, stasis dermatitis, and/or venous ulcers. Leave primary dressing and multi-layer wrap(s) in place until the next appointment. Also discussed ways to keep the wrap dry for a shower, including a plastic cast-guard, available in retail pharmacies. He should call before his next visit if there is any significant pain, significant strike-through of drainage to the surface of the wrap, or any significant sense of the wrap sliding down more than an inch or so, bunching-up and abrading his skin. Will be backing off to [address] sometime soon, when the LLE ulcers look more durably healed.     I reminded the patient of the importance of weight management and smoking cessation, if applicable; also encouraged ambulation as tolerated, additional lower extremity exercises as instructed in our education sheet, leg elevation when at rest, and compliance with any recommended dietary, diuretic and compression therapies. Continue ROHO Heal Pad boot for offloading on the left side. For the right knee this week, I just placed some foam padding proximal and distal to the wound, then wrapped with cast padding, Ace, and a \"J\" Spandagrip to hold it in place. Recommended changing from Nystatin powder to an azole antifungal cream for that left thigh, since the skin is so dry there now. Keep up the good work with glucose control, protein intake, frequent repositioning in bed, minimal time in his chair. Home treatment: good handwashing before and after any dressing changes. Cleanse ulcer with saline or soap & water before dressing change. May use Vaseline (petrolatum), Aquaphor, Aveeno, CeraVe, Cetaphil, Eucerin, Lubriderm, etc for dry skin. Dressing type for home: as above for the T-spine and pelvis, three times weekly. Written discharge instructions given to patient. Follow up in 1 week.     Electronically signed by Jose Farfan MD on 1/3/2020 at 2:48 PM.

## 2020-01-08 ENCOUNTER — HOSPITAL ENCOUNTER (OUTPATIENT)
Dept: WOUND CARE | Age: 55
Discharge: HOME | End: 2020-01-08
Payer: MEDICARE

## 2020-01-08 VITALS
HEIGHT: 74 IN | RESPIRATION RATE: 18 BRPM | WEIGHT: 257 LBS | DIASTOLIC BLOOD PRESSURE: 62 MMHG | TEMPERATURE: 97.8 F | HEART RATE: 76 BPM | BODY MASS INDEX: 32.98 KG/M2 | SYSTOLIC BLOOD PRESSURE: 103 MMHG

## 2020-01-08 PROCEDURE — 17250 CHEM CAUT OF GRANLTJ TISSUE: CPT | Performed by: INTERNAL MEDICINE

## 2020-01-08 PROCEDURE — 17250 CHEM CAUT OF GRANLTJ TISSUE: CPT

## 2020-01-08 PROCEDURE — 29581 APPL MULTLAYER CMPRN SYS LEG: CPT

## 2020-01-08 PROCEDURE — 29581 APPL MULTLAYER CMPRN SYS LEG: CPT | Performed by: INTERNAL MEDICINE

## 2020-01-08 RX ORDER — INSULIN GLARGINE 100 [IU]/ML
INJECTION, SOLUTION SUBCUTANEOUS
Qty: 30 ML | Refills: 0 | Status: SHIPPED | OUTPATIENT
Start: 2020-01-08 | End: 2020-01-23 | Stop reason: SDUPTHER

## 2020-01-08 RX ORDER — LIDOCAINE HYDROCHLORIDE 40 MG/ML
SOLUTION TOPICAL ONCE
Status: DISCONTINUED | OUTPATIENT
Start: 2020-01-08 | End: 2020-01-09 | Stop reason: HOSPADM

## 2020-01-08 ASSESSMENT — PAIN SCALES - GENERAL: PAINLEVEL_OUTOF10: 0

## 2020-01-08 NOTE — FLOWSHEET NOTE
Betadine and mepilex border applied to right distal stump. Betadine to linear healed area then coglex TLC on left lower leg. Zinc oxide applied to left ishium.

## 2020-01-13 NOTE — PROGRESS NOTES
[facility] Progress Note    Jyoti Bradshaw     : 1965    DATE OF VISIT:  2020     Subjective:     Jyoti Bradshaw is a 48 y.o. male who has a diabetic  ulcer located on the right knee, foot (left , heel) and left, great toe. Significant symptoms or pertinent wound history since last visit: feeling well overall, no F/C/D. Left thigh rash improved this week with a daily azole antifungal cream. No SOB or CP. No N/V/D. Seems to have done ok with stopping casting of his RLE, in terms of his exam there. Doing well with left foot ROHO offloading boot, still getting weekly compression wrap on the left side, given his edema and open ulcers there. Additional ulcer(s) noted? yes. T-spine surgical wound, and right ischial pressure ulcer (left ischial perhaps very slightly reopened, perhaps still healed). His current medication list consists of Insulin Pen Needle, apixaban, aspirin, blood glucose test strips, cetirizine, cyclobenzaprine, docusate sodium, ferrous sulfate, insulin glargine, insulin lispro (1 Unit Dial), lisinopril, magnesium oxide, metFORMIN, metoprolol succinate, nitroGLYCERIN, nystatin, oxyCODONE, pantoprazole, polyethylene glycol, promethazine, rosuvastatin, senna, torsemide, and traZODone. Allergies: Benadryl [diphenhydramine hcl]; Statins [statins];  Cephalexin; Morphine; Penicillins; and Sulfa antibiotics    Objective:     Vitals:    20 1005   BP: 103/62   Pulse: 76   Resp: 18   Temp: 97.8 °F (36.6 °C)   TempSrc: Oral   Weight: 257 lb (116.6 kg)   Height: 6' 2\" (1.88 m)     AAOx3, NAD  Intact left pedal pulses; left foot and right BK stump warm, good cap refill  Mild-mod BL LE stage 2 lymphedema  No cellulitis, angitis, fluctuance  No acute arthritis or bursitis  Ongoing waxing and waning of papular rash of trunk, sometimes neck and forearms; left posterior thigh patch of fungal dermatitis faded  Chetna-ulcer skin: generally pink and indurated, mild hyperkeratosis at ischium and heel and toe; mild erythema at T-spine; improved ecchymotic / petechial / contact erythema at right knee. Ulcer(s): T-spine with typical inflamed granulation, some biofilm, exposed screw heads, no pus; right ischium very small, red, granular, fragile; right knee smaller, red, hypergranular, draining less; left heel a bit larger but only because of hyperkeratotic skin coming off with cleansing, it's still quite clean, red and granular; left great toe quickly drier and darkly but superfiically necrotic after AgNO3 a week or two ago. Photos also saved in electronic chart.     Today's Wound Measurements, per RN documentation:  Wound 10/17/14 #8, right ischium, stage 4 PU, onset 7/15/14 (merged with #30), pressure-Wound Length (cm): 0.6 cm  Wound 04/10/19 #49, Right Knee Anterior - CLUSTER, Garrick BLANC 2, (onset 2018), gradually appeared-Wound Length (cm): 1.8 cm  Wound 12/11/19 #56, Left Great  Toe, Traumatic Injury, Partial Thickness, Onset 12/6/19-Wound Length (cm): 1.1 cm(remeasured per MD)  Wound 12/11/19 #57, Left Heel, Traumatic Injury, Partial Thickness, Onset 12/6/19-Wound Length (cm): 1.1 cm  Wound 08/16/17 #27- Thoracic spine, dehisced surgical wound, full thickness, onset 8/16/2017, pressure-Wound Length (cm): 4.5 cm    Wound 10/17/14 #8, right ischium, stage 4 PU, onset 7/15/14 (merged with #30), pressure-Wound Width (cm): 0.3 cm  Wound 04/10/19 #49, Right Knee Anterior - CLUSTER, Garrick BLANC 2, (onset 2018), gradually appeared-Wound Width (cm): 2 cm  Wound 12/11/19 #56, Left Great  Toe, Traumatic Injury, Partial Thickness, Onset 12/6/19-Wound Width (cm): 0.9 cm  Wound 12/11/19 #57, Left Heel, Traumatic Injury, Partial Thickness, Onset 12/6/19-Wound Width (cm): 0.7 cm  Wound 08/16/17 #27- Thoracic spine, dehisced surgical wound, full thickness, onset 8/16/2017, pressure-Wound Width (cm): 1.1 cm    Wound 10/17/14 #8, right ischium, stage 4 PU, onset 7/15/14 (merged with #30), pressure-Wound Depth (cm): 0.1 cm  Wound 04/10/19 #49, Right Knee Anterior - CLUSTER, DWLE, [place] [address], (onset 2018), gradually appeared-Wound Depth (cm): 0.1 cm  Wound 12/11/19 #56, Left Great  Toe, Traumatic Injury, Partial Thickness, Onset 12/6/19-Wound Depth (cm): 0.1 cm  Wound 12/11/19 #57, Left Heel, Traumatic Injury, Partial Thickness, Onset 12/6/19-Wound Depth (cm): 0.1 cm  Wound 08/16/17 #27- Thoracic spine, dehisced surgical wound, full thickness, onset 8/16/2017, pressure-Wound Depth (cm): 0.6 cm    Assessment:     Patient Active Problem List   Diagnosis Code    Mixed hyperlipidemia E78.2    Coronary artery disease involving native coronary artery of native heart without angina pectoris I25.10    Paraplegia, complete ([facility]) G82.21    Chronic back pain M54.9, G89.29    Arthritis M19.90    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 No current signs of ischemia or soft tissue infection. In need of ongoing attention to offloading and compression and simple but careful local wound mgmt, primarily. Factors contributing to occurrence and/or persistence of the chronic ulcer include lymphedema, diabetes, chronic pressure, decreased mobility and shear force. Medical necessity of today's visit is shown by the above documentation. Sharp debridement is not indicated today, based upon the exam findings in the wound(s) above. Procedure note:     Consent obtained. Time out performed per [facility] policy. No local anesthetic necessary because of his Hx of spinal cord injury. To encourage better epithelial cell coverage, I did use AgNO3 to chemically cauterize hypergranulation tissue on the right knee ulcer(s), after application of 4% lidocaine topical solution. This was tolerated well, with no significant pain or skin injury. Discharge plan:     Treatment in the wound care center today, per RN documentation: Wound 10/17/14 #8, right ischium, stage 4 PU, onset 7/15/14 (merged with #30), pressure-Dressing/Treatment: (antibiotic calmoseptine ointment silver alginate)  Wound 04/10/19 #49, Right Knee Anterior - CLUSTER, DWLE, [place] [address], (onset 2018), gradually appeared-Dressing/Treatment: (vashe calmoseptine opticel ag) -- I then did another protective wrap with foam, cast padding, Ace wrap. Wound 12/11/19 #56, Left Great  Toe, Traumatic Injury, Partial Thickness, Onset 12/6/19-Dressing/Treatment: (vashe hydrogel mepilex border)  Wound 12/11/19 #57, Left Heel, Traumatic Injury, Partial Thickness, Onset 12/6/19-Dressing/Treatment: (vashe hydrogel 4x4's co flex TLC)  Wound 08/16/17 #27- Thoracic spine, dehisced surgical wound, full thickness, onset 8/16/2017, pressure-Dressing/Treatment: (vashe calmoseptine silver alginate drawtex mepilex border). Per physician order, left application of multilayer compression wrap was performed in the wound care center today, to help manage edema, stasis dermatitis, and/or venous ulcers. Leave primary dressing and multi-layer wrap(s) in place until the next appointment.  Also

## 2020-01-15 ENCOUNTER — HOSPITAL ENCOUNTER (OUTPATIENT)
Dept: WOUND CARE | Age: 55
Discharge: HOME | End: 2020-01-15
Payer: MEDICARE

## 2020-01-15 VITALS
HEART RATE: 69 BPM | BODY MASS INDEX: 33 KG/M2 | HEIGHT: 74 IN | DIASTOLIC BLOOD PRESSURE: 75 MMHG | SYSTOLIC BLOOD PRESSURE: 112 MMHG | TEMPERATURE: 97.1 F | RESPIRATION RATE: 18 BRPM

## 2020-01-15 PROCEDURE — 17250 CHEM CAUT OF GRANLTJ TISSUE: CPT | Performed by: INTERNAL MEDICINE

## 2020-01-15 PROCEDURE — 11042 DBRDMT SUBQ TIS 1ST 20SQCM/<: CPT

## 2020-01-15 PROCEDURE — 17250 CHEM CAUT OF GRANLTJ TISSUE: CPT

## 2020-01-15 PROCEDURE — 97597 DBRDMT OPN WND 1ST 20 CM/<: CPT

## 2020-01-15 PROCEDURE — 29581 APPL MULTLAYER CMPRN SYS LEG: CPT

## 2020-01-15 PROCEDURE — 97597 DBRDMT OPN WND 1ST 20 CM/<: CPT | Performed by: INTERNAL MEDICINE

## 2020-01-15 PROCEDURE — 11042 DBRDMT SUBQ TIS 1ST 20SQCM/<: CPT | Performed by: INTERNAL MEDICINE

## 2020-01-15 RX ORDER — LIDOCAINE HYDROCHLORIDE 40 MG/ML
SOLUTION TOPICAL ONCE
Status: DISCONTINUED | OUTPATIENT
Start: 2020-01-15 | End: 2020-01-16 | Stop reason: HOSPADM

## 2020-01-15 ASSESSMENT — PAIN DESCRIPTION - LOCATION: LOCATION: SHOULDER

## 2020-01-15 ASSESSMENT — PAIN DESCRIPTION - ORIENTATION: ORIENTATION: LEFT

## 2020-01-15 ASSESSMENT — PAIN DESCRIPTION - DESCRIPTORS: DESCRIPTORS: BURNING;STABBING

## 2020-01-15 ASSESSMENT — PAIN DESCRIPTION - FREQUENCY: FREQUENCY: CONTINUOUS

## 2020-01-15 ASSESSMENT — PAIN DESCRIPTION - PAIN TYPE: TYPE: CHRONIC PAIN

## 2020-01-15 ASSESSMENT — PAIN DESCRIPTION - PROGRESSION: CLINICAL_PROGRESSION: NOT CHANGED

## 2020-01-15 ASSESSMENT — PAIN DESCRIPTION - ONSET: ONSET: ON-GOING

## 2020-01-15 ASSESSMENT — PAIN SCALES - GENERAL: PAINLEVEL_OUTOF10: 3

## 2020-01-15 ASSESSMENT — ACTIVITIES OF DAILY LIVING (ADL): EFFECT OF PAIN ON DAILY ACTIVITIES: REPOSITIONING

## 2020-01-15 ASSESSMENT — PAIN - FUNCTIONAL ASSESSMENT: PAIN_FUNCTIONAL_ASSESSMENT: PREVENTS OR INTERFERES SOME ACTIVE ACTIVITIES AND ADLS

## 2020-01-15 NOTE — FLOWSHEET NOTE
Polysporin and mepilex border applied to right distal stump. Betadine to linear healed area CoFlex TLC compression wrap applied to left lower leg. Clortrimazole to left posterior thigh.

## 2020-01-20 PROBLEM — E11.621 DIABETIC ULCER OF TOE OF LEFT FOOT ASSOCIATED WITH TYPE 2 DIABETES MELLITUS, WITH FAT LAYER EXPOSED (HCC): Status: ACTIVE | Noted: 2020-01-20

## 2020-01-20 PROBLEM — L97.522 DIABETIC ULCER OF TOE OF LEFT FOOT ASSOCIATED WITH TYPE 2 DIABETES MELLITUS, WITH FAT LAYER EXPOSED (HCC): Status: ACTIVE | Noted: 2020-01-20

## 2020-01-20 NOTE — PROGRESS NOTES
88 [facility] Progress Note    Vivi Byrd     : 1965    DATE OF VISIT:  1/15/2020    Subjective:     Vivi Byrd is a 48 y.o. male who has a diabetic  ulcer located on the right knee, foot (left , heel) and left, great toe. Significant symptoms or pertinent wound history since last visit: feeling ok overall. No F/C/D. Still a rash on his torso and arms, primarily, pruritic at times. A bit more redness and moisture around the pelvis this week, he thinks from the unseasonably warm weather we've had. Minimizing time in his motorized wheelchair in order to offload the ischial areas as much as possible, but it's not been easy for him to offload as well as he'd like to in his current bed, because it's not working well (in terms of allowing him to reposition reliably). Has some occasional shortness of breath when he tries to lie supine. No chest pain, no cough. No recent N/V/D. Doing very well with his left sided ROHO offloading boot. Doing ok minus the cast on his right knee, and with a simpler bulky soft wrap. Additional ulcer(s) noted? yes. T-spine surgical wound and healing right ischial pressure ulcer. His current medication list consists of Insulin Pen Needle, apixaban, aspirin, blood glucose test strips, cetirizine, cyclobenzaprine, docusate sodium, ferrous sulfate, insulin glargine, insulin lispro (1 Unit Dial), lisinopril, magnesium oxide, metFORMIN, metoprolol succinate, nitroGLYCERIN, nystatin, oxyCODONE, pantoprazole, polyethylene glycol, promethazine, rosuvastatin, senna, torsemide, and traZODone. Allergies: Benadryl [diphenhydramine hcl]; Statins [statins];  Cephalexin; Morphine; Penicillins; and Sulfa antibiotics    Objective:     Vitals:    01/15/20 1023   BP: 112/75   Pulse: 69   Resp: 18   Temp: 97.1 °F (36.2 °C)   TempSrc: Oral   Height: 6' 2\" (1.88 m)     AAOx3, overweight, a bit fatigued, NAD  Intact left pedal pulses; left foot and right BK stump 12/6/19-Dressing/Treatment: (triad antibiotic ointment bandaid)  Wound 12/11/19 #57, Left Heel, Diabetic ulcer, Mccauley 1, Onset 12/6/19-Dressing/Treatment: (vashe hydrogel 4x4's coflex TLC)  Wound 04/10/19 #49, Right Knee Anterior - DWLE, Mccauley 2, (onset 2018), gradually appeared-Dressing/Treatment: (vasche calmoseptine triamcinolone opticell ag) -- I then wrapped him again with some periwound foam padding, cast padding, Ace wrap, \"J\" Dermafit. Wound 08/16/17 #27, Thoracic spine CLUSTER, dehisced surgical wound, full thickness, onset 8/16/2017-Dressing/Treatment: (vashe calmoseptine opticel ag drawtex mepilex border)  Wound 10/17/14 #8, Right ischium, stage 4 pressure ulcer, onset 7/15/14 -Dressing/Treatment: (antibiotic ointment silver alginate calmoseptine). Keep up the good work with protein intake, glucose control, minimizing chair time, changing positions in bed as frequently as possible. No Abx needed at present. Can resume use of topical antifungals PRN. ______________________    Mr. Andry Savage requires a new hospital bed and group II mattress at this point. The current bed has not lasted as long as we would have hoped, and the parts that are broken have already undergone attempted repair at least a few times. With those broken parts, his bed does not allow him to elevate the head of his bed as much as is necessary to consistently relieve his shortness of breath (related to CHF), and he is not able to sit up to the degree necessary to have meals safely while in bed. In the past, he would transfer from bed into his motorized chair in order to eat, but he simply could not get his ischial ulcers adequately offloaded while in that chair. In terms of the potential consideration for a standard, fixed-height hospital bed at home, Mr. Delatorre's history of paraplegia, spinal fusion procedures and recurrent pressure ulcers require positioning of his body in ways not feasible with an ordinary bed.  In addition, he requires the head of the bed to be elevated more than 30 degrees for much of the time due to cardiomyopathy, a history of congestive heart failure, and recurrent shortness of breath. Beyond a standard fixed-height bed, Mr. Sarah Alvarado requires a variable-height bed in order to permit transfers to his motorized wheelchair, when he begins to do that more frequently again (as soon as the right ischial ulcer is completely healed, which could be in the coming weeks). Beyond even the variable-height bed, Mr. Sarah Alvarado requires a semi-electric hospital bed because of the need for frequent changes in body position, in order to offload multiple parts of his body at various times during the day and night, as he has had widespread pressure ulcers in the past (thoracic spine, left ischium, right ischium, sacrum, right hip). And beyond even a usual 36\" wide semi-electric bed and mattress, I believe it is medically necessary for him to have a heavy-duty extra wide hospital bed and mattress. While he does not meet the usual weight requirement for a bariatric bed and mattress, he does require the increased width of the bed and mattress because of his broad shoulders, and his history of both cervical and thoracolumbar fusions. He requires more than the usual 36\" or even 42\" of width to turn from side to side as much as he needs to, in order to best offload his ischia and hips (i.e., he can't tuck his shoulders in and roll in a narrower amount of space, but instead has to roll with his shoulders, neck and chest largely rigid, because of those previous spinal surgeries; that maneuver simply requires a greater extent of width than a standard hospital bed and mattress can provide). With all of those above things in mind, I am prescribing a [employer]'s Pride [id number] bariatric bed and mattress at this point (made by [facility]).  The construction of that bed is superior to the models that he has had in the past, and it should healing as well as it is because of the group II mattress that he has slept on for quite some time, and he will be at high risk of recurrence of those pressure ulcers if he does not maintain aggressive offloading with a group II (low air loss) mattress. I'll send off a script to a local distributor, and a copy to [facility], and will see what can be done.   _________________________    Home treatment: good handwashing before and after any dressing changes. Cleanse wound with saline or soap & water before dressing change. May use Vaseline (petrolatum), Aquaphor, Aveeno, CeraVe, Cetaphil, Eucerin, Lubriderm, etc for dry skin. Dressing type for home: as above for the T-spine, ischium and left great toe, three times weekly -- with the great toe being stalled, will see if more frequent dressing changes can jump-start that a bit. Written discharge instructions given to patient. Follow up in 1 week. Electronically signed by Jose Farfan MD on 1/20/2020 at 2:56 PM.  _____________________________    ADDENDUM --    I failed to include this in my note when I completed my original documentation, but this additional procedure WAS performed on this date of service, based upon my exam of his knee ulcer on that day:    Procedure note:     Consent obtained. Time out performed per [facility] policy. To encourage better epithelial cell coverage, I did use AgNO3 to chemically cauterize hypergranulation tissue on the right knee ulcer(s), after application of 4% lidocaine topical solution. This was tolerated well, with no significant pain or skin injury.    _____________________________    Electronically signed by Jose Farfan MD on 1/21/2020 at 9:35 AM

## 2020-01-22 ENCOUNTER — HOSPITAL ENCOUNTER (OUTPATIENT)
Dept: WOUND CARE | Age: 55
Discharge: HOME | End: 2020-01-22
Payer: MEDICARE

## 2020-01-22 VITALS
DIASTOLIC BLOOD PRESSURE: 68 MMHG | WEIGHT: 255 LBS | HEIGHT: 74 IN | RESPIRATION RATE: 20 BRPM | HEART RATE: 61 BPM | BODY MASS INDEX: 32.73 KG/M2 | SYSTOLIC BLOOD PRESSURE: 113 MMHG | TEMPERATURE: 97.4 F

## 2020-01-22 PROCEDURE — 17250 CHEM CAUT OF GRANLTJ TISSUE: CPT | Performed by: INTERNAL MEDICINE

## 2020-01-22 PROCEDURE — 97597 DBRDMT OPN WND 1ST 20 CM/<: CPT

## 2020-01-22 PROCEDURE — 17250 CHEM CAUT OF GRANLTJ TISSUE: CPT

## 2020-01-22 PROCEDURE — 29580 STRAPPING UNNA BOOT: CPT

## 2020-01-22 PROCEDURE — 97597 DBRDMT OPN WND 1ST 20 CM/<: CPT | Performed by: INTERNAL MEDICINE

## 2020-01-22 RX ORDER — FLUCONAZOLE 200 MG/1
200 TABLET ORAL DAILY
Qty: 7 TABLET | Refills: 0 | Status: SHIPPED | OUTPATIENT
Start: 2020-01-22 | End: 2020-01-29

## 2020-01-22 RX ORDER — LIDOCAINE 40 MG/G
CREAM TOPICAL ONCE
Status: DISCONTINUED | OUTPATIENT
Start: 2020-01-22 | End: 2020-01-23 | Stop reason: HOSPADM

## 2020-01-22 ASSESSMENT — PAIN SCALES - GENERAL: PAINLEVEL_OUTOF10: 0

## 2020-01-22 NOTE — FLOWSHEET NOTE
Clotrimazole to left posterior thigh. Betadine to healed area, opticell ag to left pretib area, pink unna boot and coban to left lower leg.

## 2020-01-22 NOTE — PLAN OF CARE
Left great toe improved some from last week, debridement per MD, will cont. With current wound care regime. Left heel also improving, debridement per MD, SANDEE heel pad for off-loading. Pt. Noted to have new abrasions to left Pretib area, pt. Unsure how this occurred, compression wrap remained in place & did not slide. Will place Ag Alginate to pretib & change to using unna boot & coban for edema control in left leg. Farrow 4000 compression garment for long-term compression pending insurance approval. Right knee stable, Ag Nitrate used per MD, will change to using unna patch in place of Calmoseptine to avoid \"scrubbing\" of excess Calmoseptine & causing skin irritation, otherwise cont. With current wound care regime. Back wound & ischial wounds stable, no changes in treatment this week. Script for Diflucan sent to pharmacy per MD d/t fungal skin irritation noted. May also cont. With Miconazole or Clotrimazole topically. F/u in [address] [place] [address],[address] in 1 week as ordered. Discharge instructions reviewed with patient, all questions answered, copy given to patient. Dressings were applied to all wounds per M.D. Instructions at this visit.

## 2020-01-23 RX ORDER — INSULIN GLARGINE 100 [IU]/ML
INJECTION, SOLUTION SUBCUTANEOUS
Qty: 30 ML | Refills: 2 | Status: SHIPPED | OUTPATIENT
Start: 2020-01-23 | End: 2020-05-15 | Stop reason: SDUPTHER

## 2020-01-23 NOTE — TELEPHONE ENCOUNTER
----- Message from Deedee Marquez sent at 1/23/2020 11:22 AM EST -----  Contact: [phone]  Pt called because he needs a refill on lantus.     Pharmacy-[facility] [address]    Past appt-11/27/2019  Future appt-5/15/2020    [phone]

## 2020-01-29 ENCOUNTER — HOSPITAL ENCOUNTER (OUTPATIENT)
Dept: WOUND CARE | Age: 55
Discharge: HOME | End: 2020-01-29
Payer: MEDICARE

## 2020-01-29 VITALS
SYSTOLIC BLOOD PRESSURE: 121 MMHG | DIASTOLIC BLOOD PRESSURE: 70 MMHG | HEART RATE: 71 BPM | TEMPERATURE: 98.7 F | RESPIRATION RATE: 18 BRPM

## 2020-01-29 PROCEDURE — 11042 DBRDMT SUBQ TIS 1ST 20SQCM/<: CPT | Performed by: INTERNAL MEDICINE

## 2020-01-29 PROCEDURE — 29580 STRAPPING UNNA BOOT: CPT

## 2020-01-29 PROCEDURE — 11042 DBRDMT SUBQ TIS 1ST 20SQCM/<: CPT

## 2020-01-29 RX ORDER — LIDOCAINE 40 MG/G
CREAM TOPICAL ONCE
Status: DISCONTINUED | OUTPATIENT
Start: 2020-01-29 | End: 2020-01-30 | Stop reason: HOSPADM

## 2020-01-29 ASSESSMENT — PAIN SCALES - GENERAL
PAINLEVEL_OUTOF10: 0
PAINLEVEL_OUTOF10: 0

## 2020-01-30 RX ORDER — BLOOD SUGAR DIAGNOSTIC
STRIP MISCELLANEOUS
Qty: 100 EACH | Refills: 11 | Status: SHIPPED | OUTPATIENT
Start: 2020-01-30

## 2020-01-30 RX ORDER — BLOOD SUGAR DIAGNOSTIC
STRIP MISCELLANEOUS
Qty: 100 EACH | Refills: 10 | Status: SHIPPED | OUTPATIENT
Start: 2020-01-30 | End: 2020-01-30 | Stop reason: ALTCHOICE

## 2020-02-03 NOTE — PROGRESS NOTES
PSO, Opticell Ag, Calmoseptine to periwound, applied to left and right ischial wounds per order.
of the chronic ulcer include lymphedema, diabetes, chronic pressure, decreased mobility, shear force and obesity. Medical necessity of today's visit is shown by the above documentation. Sharp debridement is indicated today, based upon the exam findings in the wound(s) above. Procedure note:     Consent obtained. Time out performed per [facility] policy. Anesthetic  Anesthetic: 4% Lidocaine Cream     Using a curette, I sharply debrided the left, great toe ulcer(s) down through and including the removal of subcutaneous tissue. The type(s) of tissue debrided included fibrin, biofilm and necrotic/eschar. Total Surface Area Debrided: 1 sq cm. The ulcers were then irrigated with normal saline solution. The procedure was completed with a small amount of bleeding, and hemostasis was with pressure. The patient tolerated the procedure well, with no significant complications. The patient's level of pain during and after the procedure was monitored. Post-debridement measurements, if different from pre-debridement, are in the flowsheet as well. Discharge plan:     Treatment in the wound care center today, per RN documentation: Wound 12/11/19 #56, Left Great Toe, Diabetic, Mccauley 1, Onset 12/6/19-Dressing/Treatment: (Triad/PSO, bandaid)  [REMOVED] Wound 12/11/19 #57, Left Heel, Diabetic ulcer, Mccauley 1, Onset 12/6/19-Dressing/Treatment: (Aquophor, gauze, foam)  Wound 04/10/19 #49, Right Knee Anterior - DWLE, Mccauley 2, (onset 2018), gradually appeared-Dressing/Treatment: (Vashe, unna patch, and then I re-wrapped him with cast padding, Ace wraps, \"J\" DermaFit). Wound 08/16/17 #27, Thoracic spine CLUSTER, dehisced surgical wound, full thickness, onset 8/16/2017-Dressing/Treatment: (Vashe, Calmoseptine, Opticell Ag,Drawtex,Mepilex border)  [REMOVED] Wound 10/17/14 #8, Right ischium, stage 4 pressure ulcer, onset 7/15/14 -Dressing/Treatment: (antibiotic ointment, Calmoseptine).     I think we need to continue to treat the

## 2020-02-05 ENCOUNTER — HOSPITAL ENCOUNTER (OUTPATIENT)
Dept: WOUND CARE | Age: 55
Discharge: HOME | End: 2020-02-05
Payer: MEDICARE

## 2020-02-05 VITALS
SYSTOLIC BLOOD PRESSURE: 109 MMHG | TEMPERATURE: 97.5 F | HEART RATE: 77 BPM | DIASTOLIC BLOOD PRESSURE: 71 MMHG | RESPIRATION RATE: 18 BRPM

## 2020-02-05 PROCEDURE — 97597 DBRDMT OPN WND 1ST 20 CM/<: CPT

## 2020-02-05 PROCEDURE — 97597 DBRDMT OPN WND 1ST 20 CM/<: CPT | Performed by: INTERNAL MEDICINE

## 2020-02-05 PROCEDURE — 29580 STRAPPING UNNA BOOT: CPT

## 2020-02-05 RX ORDER — LIDOCAINE HYDROCHLORIDE 40 MG/ML
SOLUTION TOPICAL ONCE
Status: DISCONTINUED | OUTPATIENT
Start: 2020-02-05 | End: 2020-02-06 | Stop reason: HOSPADM

## 2020-02-05 ASSESSMENT — PAIN DESCRIPTION - FREQUENCY: FREQUENCY: CONTINUOUS

## 2020-02-05 ASSESSMENT — PAIN DESCRIPTION - ONSET: ONSET: ON-GOING

## 2020-02-05 ASSESSMENT — PAIN SCALES - GENERAL
PAINLEVEL_OUTOF10: 3
PAINLEVEL_OUTOF10: 0

## 2020-02-05 ASSESSMENT — PAIN DESCRIPTION - PAIN TYPE: TYPE: CHRONIC PAIN

## 2020-02-05 ASSESSMENT — PAIN DESCRIPTION - LOCATION: LOCATION: SHOULDER

## 2020-02-05 ASSESSMENT — PAIN - FUNCTIONAL ASSESSMENT: PAIN_FUNCTIONAL_ASSESSMENT: PREVENTS OR INTERFERES SOME ACTIVE ACTIVITIES AND ADLS

## 2020-02-05 ASSESSMENT — PAIN DESCRIPTION - ORIENTATION: ORIENTATION: LEFT

## 2020-02-05 ASSESSMENT — PAIN DESCRIPTION - PROGRESSION: CLINICAL_PROGRESSION: NOT CHANGED

## 2020-02-05 ASSESSMENT — PAIN DESCRIPTION - DESCRIPTORS: DESCRIPTORS: BURNING;STABBING

## 2020-02-05 NOTE — PROGRESS NOTES
Aquaphor,gauze,foam,Rancho appied to healed left heel wound. Betadine,Pink unna boot, Coban applied to LLE per MD order. Patient tolerates well. ROHO heel pad applied to Left heel.

## 2020-02-06 ENCOUNTER — TELEPHONE (OUTPATIENT)
Dept: INTERNAL MEDICINE CLINIC | Age: 55
End: 2020-02-06

## 2020-02-06 NOTE — TELEPHONE ENCOUNTER
----- Message from Micaela Dorado MD sent at 2/6/2020  1:48 PM EST -----  Let him buy OTC  ----- Message -----  From: Jalen Og  Sent: 2/6/2020   1:36 PM EST  To: Micaela Dorado MD    Appeal won't help because it is not covered at all because it is OTC  ----- Message -----  From: Micaela Dorado MD  Sent: 2/6/2020  12:25 PM EST  To: Jalen Og    It is high dose and previously covered by insurance  Appeal  ----- Message -----  From: Edie Lazar  Sent: 2/6/2020  11:11 AM EST  To: Micaela Dorado MD    PA denied for Magnesium Oxide 400 MG because it is an over the counter medication and Medicare does not cover medications that can be bought over the counter. Please advise.

## 2020-02-06 NOTE — TELEPHONE ENCOUNTER
----- Message from Ritchie Fraction sent at 2/6/2020  1:58 PM EST -----  LM to call back. ----- Message -----  From: Gayla Spencer MD  Sent: 2/6/2020   1:48 PM EST  To: Ritchie Fraction    Let him buy OTC  ----- Message -----  From: Ritchie Fraction  Sent: 2/6/2020   1:36 PM EST  To: Gayla Spencer MD    Appeal won't help because it is not covered at all because it is OTC  ----- Message -----  From: Gayla Spencer MD  Sent: 2/6/2020  12:25 PM EST  To: Ritchie Fraction    It is high dose and previously covered by insurance  Appeal  ----- Message -----  From: Mae Bourgeois  Sent: 2/6/2020  11:11 AM EST  To: Gayla Spencer MD    PA denied for Magnesium Oxide 400 MG because it is an over the counter medication and Medicare does not cover medications that can be bought over the counter. Please advise.

## 2020-02-06 NOTE — LETTER
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 Due to patient's medical condition patient must take magnesium oxide 400 mg and requires a high dose of 3600 mg daily. Patient takes 4 tablets in the morning and 5 tablets in the evening. Patient has been prescribed this medication for years and is stable on current therapy. I would appreciate it if you reconsider determination regarding this medication. If you have any questions or concerns please contact my office.        Sincerely,        Edilma Leone MD

## 2020-02-10 NOTE — PROGRESS NOTES
88 [facility] Progress Note    Vipul Marin     : 1965    DATE OF VISIT:  2020    Subjective:     Vipul Marin is a 48 y.o. male who has a diabetic ulcer located on the right knee and left, great toe. Significant symptoms or pertinent wound history since last visit: feeling well overall, no F/C/D. Eating well, glucoses doing well. No recent SOB or CP. Doing well staying offloaded. He thinks he might be closer to getting a new hospital bed and mattress from [facility] (waiting on final insurance authorization). No word yet on official Medicare denial of long-term compression garment for that left leg, after which we'll see if we can't get it covered by his waiver program. [facility] Pad boot still doing well. Additional ulcer(s) noted? Yes -- T-spine surgical dehiscence still present, but both ischia appear healed. His current medication list consists of Insulin Pen Needle, Insulin Syringe-Needle U-100, apixaban, aspirin, blood glucose test strips, cetirizine, cyclobenzaprine, docusate sodium, ferrous sulfate, insulin glargine, insulin lispro, lisinopril, magnesium oxide, metFORMIN, metoprolol succinate, nitroGLYCERIN, nystatin, oxyCODONE, pantoprazole, polyethylene glycol, promethazine, rosuvastatin, senna, torsemide, and traZODone. Allergies: Benadryl [diphenhydramine hcl]; Statins [statins];  Cephalexin; Morphine; Penicillins; and Sulfa antibiotics    Objective:     Vitals:    20 1038   BP: 109/71   Pulse: 77   Resp: 18   Temp: 97.5 °F (36.4 °C)   TempSrc: Oral     AAOx3, overweight, fatigued, NAD  Intact left pedal pulses; left foot and right BK stump warm, pink, good cap refill  Usual BL mild-mod LE stage 2 lymphedema  No cellulitis, angitis, fluctuance  Still some patchy papular dermatitis around the torso, but less than in recent weeks  Skin of the buttock / inguinal / perineal areas still looking somewhat red, a bit moist, a bit hyperkeratotic - I think a combination of heat, occasional friction, mild moisture, and then also some skin irritation from constantly applying and removing zinc oxide etc  [place]hetna-ulcer skin: generally pink and indurated, fading petechial changes at knee, mild hyperkeratosis at great toe. Ulcer(s): T-spine stable inflamed granulation, moderate drainage, exposed hardware, sinus tracts; right knee tiny, red, granular, clean; left toe perhaps just a bit smaller, partly granular, partly fibrin and minor superficial necrosis, I think still a bit too dry, no signs of infection. Photos also saved in electronic chart.     Today's wound measurements, per RN documentation:  Wound 12/11/19 #56, Left Great Toe, Diabetic, Mccauley 1, Onset 12/6/19-Wound Length (cm): 1.1 cm  Wound 04/10/19 #49, Right Knee Anterior - DWLE, Mccauley 2, (onset 2018), gradually appeared-Wound Length (cm): 0.1 cm  Wound 08/16/17 #27, Thoracic spine CLUSTER, dehisced surgical wound, full thickness, onset 8/16/2017-Wound Length (cm): 5.1 cm    Wound 12/11/19 #56, Left Great Toe, Diabetic, Mccauley 1, Onset 12/6/19-Wound Width (cm): 1 cm  Wound 04/10/19 #49, Right Knee Anterior - DWLE, Mccauley 2, (onset 2018), gradually appeared-Wound Width (cm): 0.1 cm  Wound 08/16/17 #27, Thoracic spine CLUSTER, dehisced surgical wound, full thickness, onset 8/16/2017-Wound Width (cm): 1.3 cm    Wound 12/11/19 #56, Left Great Toe, Diabetic, Mccauley 1, Onset 12/6/19-Wound Depth (cm): 0.1 cm  Wound 04/10/19 #49, Right Knee Anterior - DWLE, Mccauley 2, (onset 2018), gradually appeared-Wound Depth (cm): 0.1 cm  Wound 08/16/17 #27, Thoracic spine CLUSTER, dehisced surgical wound, full thickness, onset 8/16/2017-Wound Depth (cm): 0.5 cm    Assessment:     Patient Active Problem List   Diagnosis Code    Mixed hyperlipidemia E78.2    Coronary artery disease involving native coronary artery of native heart without angina pectoris I25.10    Paraplegia, complete (HCC) G82.21    Chronic back pain M54.9, G89.29    Arthritis M19.90    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    LIBORIO on CPAP G47.33, Z99.89    Below knee amputation status, right [id number]    Diabetic ulcer of right knee associated with type 2 diabetes mellitus, with fat layer exposed ([facility] [address] E11.622, E43.018    Diabetic ulcer of toe of left foot associated with type 2 diabetes mellitus, with fat layer exposed ([facility]) E11.621, L97.522       Assessment of today's active condition(s): DM, paraplegia, multiple longstanding wounds slowly but steadily healing in recent months; the knee is very close, the toe just needs a bit more moisture for better autolytic debridement during the week I think, and the T-spine wound care is still palliative right now. BK stump looks pretty durably healed, left heel looks stronger, ischia are delicately healed, but the best they've looked - still a bit of stress on the skin in the pelvic area, from moisture, warmth, a bit of friction, etc. Factors contributing to occurrence and/or persistence of the chronic ulcer include lymphedema, diabetes, chronic pressure, decreased mobility, shear force and obesity. Medical necessity of today's visit is shown by the above documentation. Sharp debridement is indicated today, based upon the exam findings in the wound(s) above. Procedure note:     Consent obtained. Time out performed per [facility] policy.     Anesthetic  Anesthetic: 4% Lidocaine Cream     Using a #15 blade scalpel and forceps, I sharply debrided the left, great toe ulcer(s) down through and including the removal of dermis. The type(s) of tissue debrided included fibrin and necrotic/eschar. Total Surface Area Debrided: 1 sq cm. The ulcers were then irrigated with normal saline solution. The procedure was completed with a small amount of bleeding, and hemostasis was with pressure. The patient tolerated the procedure well, with no significant complications. The patient's level of pain during and after the procedure was monitored. Post-debridement measurements, if different from pre-debridement, are in the flowsheet as well.  _________________    Per physician order, left application of Unna boot was performed in the wound care center today, to help manage edema, stasis dermatitis, and/or venous ulcers. Leave primary dressing, Unna boot and secondary layer(s) in place until the next appointment. Also discussed ways to keep the wrap dry for a shower, including a plastic cast-guard, available in retail pharmacies. Discharge plan:     Treatment in the wound care center today, per RN documentation: Wound 12/11/19 #56, Left Great Toe, Diabetic, Mccauley 1, Onset 12/6/19-Dressing/Treatment: (PSO, Skin prep, hydrocolloid)  Wound 04/10/19 #49, Right Knee Anterior - DWLE, Mccauley 2, (onset 2018), gradually appeared-Dressing/Treatment: (Vashe, Unna patch, foam padding proximal and distal, cast padding, Ace wrap, \"J\" Dermafit) -- that should stay in place for the week. Wound 08/16/17 #27, Thoracic spine CLUSTER, dehisced surgical wound, full thickness, onset 8/16/2017-Dressing/Treatment: (Vashe, Calmoseptine, Opticell Ag, Mepilex border -- can add back DrawTex if heavier drainage). I'll send a letter along to the spinal surgeon's office, to see if he'd be willing to see Mr. Evens Charles as a 2nd opinion to discuss possible hardware removal.    Keep up the great work with protein intake, glucose control, offloading.    Continue to

## 2020-02-12 ENCOUNTER — HOSPITAL ENCOUNTER (OUTPATIENT)
Dept: WOUND CARE | Age: 55
Discharge: HOME | End: 2020-02-12
Payer: MEDICARE

## 2020-02-12 VITALS
TEMPERATURE: 96.8 F | HEIGHT: 74 IN | HEART RATE: 69 BPM | SYSTOLIC BLOOD PRESSURE: 127 MMHG | DIASTOLIC BLOOD PRESSURE: 73 MMHG | WEIGHT: 250 LBS | BODY MASS INDEX: 32.08 KG/M2 | RESPIRATION RATE: 18 BRPM

## 2020-02-12 PROCEDURE — 17250 CHEM CAUT OF GRANLTJ TISSUE: CPT | Performed by: INTERNAL MEDICINE

## 2020-02-12 PROCEDURE — 11042 DBRDMT SUBQ TIS 1ST 20SQCM/<: CPT

## 2020-02-12 PROCEDURE — 97597 DBRDMT OPN WND 1ST 20 CM/<: CPT | Performed by: INTERNAL MEDICINE

## 2020-02-12 PROCEDURE — 29580 STRAPPING UNNA BOOT: CPT

## 2020-02-12 PROCEDURE — 17250 CHEM CAUT OF GRANLTJ TISSUE: CPT

## 2020-02-12 RX ORDER — LIDOCAINE 40 MG/G
CREAM TOPICAL ONCE
Status: DISCONTINUED | OUTPATIENT
Start: 2020-02-12 | End: 2020-02-13 | Stop reason: HOSPADM

## 2020-02-12 ASSESSMENT — PAIN SCALES - GENERAL: PAINLEVEL_OUTOF10: 0

## 2020-02-12 NOTE — PLAN OF CARE
Left great toe wound not as dry this week, debridement per MD, will cont. With current wound care regime. Left leg & heel remains healed, will cont. With ROHO heel pad for off-loading & unna boot & coban for edema control. Pt. Still awaiting coverage of compression garment for left leg. Right knee larger this week, Ag Nitrate used per MD, pt. States he did not hit it this week. Will cont. With current wound care regime. Back wound stable, no procedure, will cont. With current wound care regime. New signs of pressure to right ischial area this week, pt. States he was in his chair for 7 hours for an eye appt. This week. Pt. Will be staying in bed for the week & off-loading as ordered. F/u in [facility] in 1 week as ordered. Discharge instructions reviewed with patient, all questions answered, copy given to patient. Dressings were applied to all wounds per M.D. Instructions at this visit.

## 2020-02-13 NOTE — TELEPHONE ENCOUNTER
----- Message from Lizette Retana MD sent at 2/13/2020  1:33 PM EST -----  Contact: [phone]  Please do  ----- Message -----  From: Ariana Marquez  Sent: 2/13/2020   1:24 PM EST  To: Lizette Retana MD    Pt called because he received a letter from his insurance stating that lispro would no longer be covered. He wanted to know if it could be changed back to Humalog because his insurance now covers Humalog. Please advise.      Pharmacy-[facility]     [phone]

## 2020-02-13 NOTE — PROGRESS NOTES
[address] [facility] Progress Note    Wilfred Chavez     : 1965    DATE OF VISIT:  2020    Subjective:     Wilfred Chavez is a 48 y.o. male who has a diabetic ulcer located on the right knee and left, great toe. Significant symptoms or pertinent wound history since last visit: he didn't have the best week this week in terms of his wounds, but nothing too serious occurred. His right knee ulcer enlarged again, with some hypergranulation, but the periwound is still doing better, and I think we just need to push ahead with minor alterations in local care. The left great toe IS more moist than last week, still some necrotic debris, but I think we're on the right track, no maceration with hydrocolloid. Left heel and shin and posterior-lateral leg doing great. T-spine stable. And unfortunately he was up in his chair for about 7 hours one day this past week, for an ophthalmology appointment, so he has some recurrent skin breakdown at the buttocks and perineum; the good news is that there IS an eye-care plan there, with cataract surgery being planned for April. No F/C/D, no SOB or CP, no vomiting or diarrhea, no very significant new skin changes this week outside the buttocks and perineum. It's a little hard to tell because of that one day with so much chair time, but I think the minimalist plan for skin care (InterDry, no skin products, no dressings, no Chux, no brief, just a cotton sheet) in that part of the body might still work out ok, though it does look like someone reapplied some ZnO to his inguinal folds again, which I don't think is helping matters. He thinks his bed is in the final stages of insurance approval and planning for delivery and set-up. No word yet on official denial of a LLE Velcro compression wrap. ROHO Heal Pad boot doing great. No word back yet from neurosurgeon's office re: a possible 2nd opinion for T-spine hardware removal.     Additional ulcer(s) noted? no.      His current medication list consists of Insulin Pen Needle, Insulin Syringe-Needle U-100, apixaban, aspirin, blood glucose test strips, cetirizine, cyclobenzaprine, docusate sodium, ferrous sulfate, insulin glargine, insulin lispro, lisinopril, magnesium oxide, metFORMIN, metoprolol succinate, nitroGLYCERIN, nystatin, oxyCODONE, pantoprazole, polyethylene glycol, promethazine, rosuvastatin, senna, torsemide, and traZODone. Allergies: Benadryl [diphenhydramine hcl]; Statins [statins]; Cephalexin; Morphine; Penicillins; and Sulfa antibiotics    Objective:     Vitals:    02/12/20 1003   BP: 127/73   Pulse: 69   Resp: 18   Temp: 96.8 °F (36 °C)   TempSrc: Oral   Weight: 250 lb (113.4 kg)   Height: 6' 2\" (1.88 m)     AAOx3, NAD  Intact left pedal pulses. Left foot and right BK stump warm, pink, good cap refill  Stable mild-mod BL LE stage 2 lymphedema  No cellulitis, angitis, fluctuance, no acute arthritis or bursitis  Increased redness and irritation at buttock and perineal skin this week, I think mostly moisture, friction and pressure, no clear Candidal relapse  Left heel looking more durably healed, left pretib looks great, left posterior-lateral calf skin getting stronger also  Chetna-ulcer skin: indurated and pink at the T-spine; slowly getting more durable and less purple/petechial at right knee; pink and mild induration at left great toe. Ulcer(s): T-spine stable inflamed granulation tissue, one or two sinus tracts with a bit of purulence, stable hardware exposure; right knee larger again, but clean, deep red hypergranular; left great toe a bit smaller, more moist, mostly granular, some fibrin and presumed biofilm, still a bit of fibrosis; two areas at the perineal area with focal denudation and inflammed hemorrhagic dermal changes. Photos also saved in electronic chart.     Today's wound measurements, per RN documentation:  Wound 12/11/19 #56, Left Great Toe, Diabetic, Mccauley 1, Onset 12/6/19-Wound type 2 diabetes mellitus, with [facility] layer [facility] E11.621, L97.522       Assessment of today's active condition(s): DM, paraplegia, overall good healing progress with most of his wounds (not counting the T-spine site with hardware exposure), with just a couple of minor setbacks this week that I think can be easily addressed. No signs of infection (outside the T-spine) or ischemia. Apart from that one day, which was unavoidable, offloading has been excellent in recent months. Factors contributing to occurrence and/or persistence of the chronic ulcer include lymphedema, diabetes, chronic pressure, decreased mobility, shear force and obesity. Medical necessity of today's visit is shown by the above documentation. Sharp debridement is indicated today, based upon the exam findings in the wound(s) above. Procedure note:     Consent obtained. Time out performed per [facility] policy. Anesthetic  Anesthetic: 4% Lidocaine Cream     Using a curette, I sharply debrided the left, great toe ulcer(s) down through and including the removal of dermis. The type(s) of tissue debrided included fibrin and necrotic/eschar. Total Surface Area Debrided: 1 sq cm. The ulcers were then irrigated with normal saline solution. The procedure was completed with a small amount of bleeding, and hemostasis was with pressure. The patient tolerated the procedure well, with no significant complications. The patient's level of pain during and after the procedure was monitored. Post-debridement measurements, if different from pre-debridement, are in the flowsheet as well.  ______________    To encourage better epithelial cell coverage, I did use AgNO3 to chemically cauterize hypergranulation tissue on the right knee ulcer(s), after application of 4% lidocaine topical solution. This was tolerated well, with no pain or skin injury.      Discharge plan:     Treatment in the wound care center today, per RN documentation: Wound 12/11/19 #56, Left Merari Meng Toe, Diabetic, Mccauley 1, Onset 12/6/19-Dressing/Treatment: (polysporin hydrocolloid )  Wound 04/10/19 #49, Right Knee Anterior - DWLE, Mccauley 2, (onset 2018), gradually appeared-Dressing/Treatment: (vashe, periwound pink unna patch opticell ag, offloading foam, cast padding, Ace, Spdangrip)  Wound 08/16/17 #27, Thoracic spine CLUSTER, dehisced surgical wound, full thickness, onset 8/16/2017-Dressing/Treatment: (vashe calmoseptine silver alginate drawtex mepilex border). For the small areas of denudation at the ischia and perineum, just focal Calmoseptine. Per physician order, left application of Unna boot was performed in the wound care center today, to help manage edema, stasis dermatitis, and/or venous ulcers. Leave primary dressing, Unna boot and secondary layer(s) in place until the next appointment. Also discussed ways to keep the wrap dry for a shower, including a plastic cast-guard, available in retail pharmacies. I reminded the patient of the importance of weight management and smoking cessation, if applicable; also encouraged ambulation as tolerated, additional lower extremity exercises as instructed in our education sheet, leg elevation when at rest, and compliance with any recommended dietary, diuretic and compression therapies. Await decision from neurosurgery on 2nd opinion for T-spine. Continue ROHO boot 24/7. Await (anticipated) Medicare denial on long-term compression garment for LLE, to then submit to waiver program to try to get coverage via that route. Await new hospital bed and mattress. Continue good work with offloading, glucose control, protein intake. Continue to manage the inguinal-perineal-buttock skin with just soap and water cleansing, thorough drying, minimal talcum or antifungal powder PRN, InterDry in inguinal creases, limited Calmoseptine on and around the new denuded areas. Home treatment: good handwashing before and after any dressing changes.  Cleanse wound with saline or soap & water before dressing change. May use Vaseline (petrolatum), Aquaphor, Aveeno, CeraVe, Cetaphil, Eucerin, Lubriderm, etc for dry skin. Dressing type for home: as above for the T-spine and left great toe and pelvic area, three times weekly. Written discharge instructions given to patient. Follow up in 1 week.     Electronically signed by Pradip Bejarano MD on 2/13/2020 at 9:31 AM.

## 2020-02-19 ENCOUNTER — HOSPITAL ENCOUNTER (OUTPATIENT)
Dept: WOUND CARE | Age: 55
Discharge: HOME | End: 2020-02-19
Payer: MEDICARE

## 2020-02-19 VITALS
SYSTOLIC BLOOD PRESSURE: 107 MMHG | WEIGHT: 253 LBS | HEART RATE: 69 BPM | HEIGHT: 74 IN | TEMPERATURE: 97 F | BODY MASS INDEX: 32.47 KG/M2 | DIASTOLIC BLOOD PRESSURE: 60 MMHG | RESPIRATION RATE: 18 BRPM

## 2020-02-19 PROCEDURE — 11043 DBRDMT MUSC&/FSCA 1ST 20/<: CPT | Performed by: INTERNAL MEDICINE

## 2020-02-19 PROCEDURE — 11043 DBRDMT MUSC&/FSCA 1ST 20/<: CPT

## 2020-02-19 PROCEDURE — 29580 STRAPPING UNNA BOOT: CPT

## 2020-02-19 PROCEDURE — 17250 CHEM CAUT OF GRANLTJ TISSUE: CPT

## 2020-02-19 PROCEDURE — 17250 CHEM CAUT OF GRANLTJ TISSUE: CPT | Performed by: INTERNAL MEDICINE

## 2020-02-19 RX ORDER — LIDOCAINE 40 MG/G
CREAM TOPICAL ONCE
Status: DISCONTINUED | OUTPATIENT
Start: 2020-02-19 | End: 2020-02-20 | Stop reason: HOSPADM

## 2020-02-19 ASSESSMENT — PAIN SCALES - GENERAL: PAINLEVEL_OUTOF10: 0

## 2020-02-21 PROBLEM — L97.523 DIABETIC ULCER OF TOE OF LEFT FOOT ASSOCIATED WITH TYPE 2 DIABETES MELLITUS, WITH NECROSIS OF MUSCLE (HCC): Status: ACTIVE | Noted: 2020-01-20

## 2020-02-21 NOTE — PROGRESS NOTES
(114.8 kg)   Height: 6' 2\" (1.88 m)      AAOx3, overweight, fatigued, NAD  Intact left pedal pulses, left foot and right BK stump warm, pink, good cap refill  Mild-mod BL LE stage 2 lymphedema  No cellulitis, angitis, fluctuance, no acute arthritis or bursitis  No definite new Candidal rash anywhere  Chetna-ulcer skin: generally pink and indurated, still some petechial and possible friction-related erythema of right knee, very mild erythema and hyperkeratosis at left great toe. Ulcer(s): T-spine with two usual exposed screw heads, some inflamed granulation there, and the known infected sinus tract to the 3:00 direction from the inferior screw has epithelialized over again; ischial ulcers remain healed, just a small nearby partial thickness focus of epidermal loss; right knee deep red, hypergranular, but a bit of new epidermal coverage around the edges; tiny denuded focus near the left heel clean and red; left pretib cluster of linear skin tears, partial thickness, clean and red; left great toe with ulcer about the same size in area, peripheral granulation, center a bit deeper with some fibronecrotic fat and now tendon/fascial tissue, no pus, no bone exposure. Photos also saved in electronic chart.     Today's wound measurements, per RN documentation:  Wound 08/16/17 #27, Thoracic spine CLUSTER, dehisced surgical wound, full thickness, onset 8/16/2017-Wound Length (cm): 5.4 cm  Wound 12/11/19 #56, Left Great Toe, Diabetic, Mccauley 2, Onset 12/6/19-Wound Length (cm): 1.7 cm  Wound 04/10/19 #49, Right Knee Anterior - DWLE, Mccauley 2, (onset 2018), gradually appeared-Wound Length (cm): 1.5 cm    Wound 08/16/17 #27, Thoracic spine CLUSTER, dehisced surgical wound, full thickness, onset 8/16/2017-Wound Width (cm): 1.8 cm  Wound 12/11/19 #56, Left Great Toe, Diabetic, Mccauley 2, Onset 12/6/19-Wound Width (cm): 1.2 cm  Wound 04/10/19 #49, Right Knee Anterior - DWLE, Mccauley 2, (onset 2018), gradually appeared-Wound Width (cm): and/or persistence of the chronic ulcer include lymphedema, diabetes, chronic pressure, decreased mobility, shear force and obesity. Medical necessity of today's visit is shown by the above documentation. Sharp debridement is indicated today, based upon the exam findings in the wound(s) above. Procedure note:     Consent obtained. Time out performed per [facility] policy. Anesthetic  Anesthetic: 4% Lidocaine Cream     Using a curette, #15 blade scalpel and forceps, I sharply debrided the left, great toe ulcer(s) down through and including the removal of muscle/fascia. The type(s) of tissue debrided included fibrin, slough and necrotic/eschar. Total Surface Area Debrided: 2 sq cm. The entire wound bed is now healthy, red and appears all viable, no signs of deep infection found, no bone exposure. The ulcers were then irrigated with normal saline solution. The procedure was completed with a small amount of bleeding, and hemostasis was with pressure. The patient tolerated the procedure well, with no significant complications. The patient's level of pain during and after the procedure was monitored. Post-debridement measurements, if different from pre-debridement, are in the flowsheet as well. To encourage better epithelial cell coverage, I did use AgNO3 to chemically cauterize hypergranulation tissue on the right knee ulcer(s), after application of 4% lidocaine topical solution. This was tolerated well, with no pain or skin injury. For the small sammi-epithelialized area near the one exposed T-spine screw, I just popped that small focus of skin open with a sterile plastic swab - a couple of mL of purulent fluid was expressed after the soft tissues were milked toward that focus; we might need to just keep doing this every couple of weeks, to prevent that area from abscessing more deeply and causing spreading soft tissue infection or febrile illness.     Discharge plan:     Treatment in the wound care center today,

## 2020-02-26 ENCOUNTER — HOSPITAL ENCOUNTER (OUTPATIENT)
Dept: WOUND CARE | Age: 55
Discharge: HOME | End: 2020-02-26
Payer: MEDICARE

## 2020-02-26 VITALS
BODY MASS INDEX: 32.75 KG/M2 | TEMPERATURE: 97.3 F | WEIGHT: 255.2 LBS | DIASTOLIC BLOOD PRESSURE: 60 MMHG | SYSTOLIC BLOOD PRESSURE: 98 MMHG | RESPIRATION RATE: 18 BRPM | HEIGHT: 74 IN | HEART RATE: 67 BPM

## 2020-02-26 PROCEDURE — 11042 DBRDMT SUBQ TIS 1ST 20SQCM/<: CPT | Performed by: INTERNAL MEDICINE

## 2020-02-26 PROCEDURE — 11042 DBRDMT SUBQ TIS 1ST 20SQCM/<: CPT

## 2020-02-26 PROCEDURE — 29581 APPL MULTLAYER CMPRN SYS LEG: CPT

## 2020-02-26 RX ORDER — LIDOCAINE 40 MG/G
CREAM TOPICAL ONCE
Status: DISCONTINUED | OUTPATIENT
Start: 2020-02-26 | End: 2020-02-27 | Stop reason: HOSPADM

## 2020-02-26 ASSESSMENT — PAIN SCALES - GENERAL: PAINLEVEL_OUTOF10: 0

## 2020-02-26 NOTE — PLAN OF CARE
Thoracic wound stable, not much change, no debridement, cont. With current wound care regime. Ischial wounds remain healed, some shearing noted to perineal area. Cont. With Calmoseptine as ordered & reinforced importance of preventing shearing. Pt. Cont. To do a good job with off-loading. Right knee stable with not much change this week. Dressings noted to be sticking to wound, no debridement, will change to using adaptic & cover with padded dressing for protection. Left great toe not showing improvement, debrided per MD, will change to using Santyl & script sent to pharmacy. Will also schedule for arterial studies to further evaluate arterial status. Left leg stable, cont. With compression but change to using compri 2 lite compression wrap. Dr Nicole Alexander will also send denial paperwork to waiver re:compression garment for left leg. F/u in [facility] in 1 week as ordered. Discharge instructions reviewed with patient, all questions answered, copy given to patient. Dressings were applied to all wounds per M.D. Instructions at this visit.

## 2020-02-26 NOTE — FLOWSHEET NOTE
Calmoseptine to right isheal and scrotal area. Betadine, 4x4's compri 2 lite, spandagrip applied to left heel. Betadine to linear healed area, adaptic to left pretib, and compri 2 lite, and spandagrip applied to left lower leg.

## 2020-02-27 RX ORDER — SENNA PLUS 8.6 MG/1
TABLET ORAL
Qty: 180 TABLET | Refills: 0 | Status: SHIPPED | OUTPATIENT
Start: 2020-02-27 | End: 2020-08-28 | Stop reason: SDUPTHER

## 2020-02-27 RX ORDER — FERROUS SULFATE 325(65) MG
TABLET ORAL
Qty: 90 TABLET | Refills: 0 | Status: SHIPPED | OUTPATIENT
Start: 2020-02-27 | End: 2020-05-15 | Stop reason: SDUPTHER

## 2020-02-27 RX ORDER — DOCUSATE SODIUM 100 MG
CAPSULE ORAL
Qty: 180 CAPSULE | Refills: 0 | Status: SHIPPED | OUTPATIENT
Start: 2020-02-27 | End: 2020-08-27 | Stop reason: SDUPTHER

## 2020-02-27 NOTE — PROGRESS NOTES
88 [facility] Progress Note    Vipul Marin     : 1965    DATE OF VISIT:  2020    Subjective:     Vipul Marin is a 48 y.o. male who has a diabetic ulcer located on the right knee and left. Significant symptoms or pertinent wound history since last visit: feeling ok overall, no F/C/D, no SOB or CP, no N/V/D. Was up in his chair for a few hours this week, to attend to a family matter. He's given some more thought to possible long-term options with his back wound, and even if he were to find someone agreeable to removing his T-spine hardware, he thinks that a more palliative plan might make the most sense, assuming he continues to tolerate that wound well (in terms of only having occasional and non-severe flare-ups of soft tissue infection there). He does still want to go back to speak to Dr. Luis A Clark in person about it. LLE compression wrap (Unna boot & Cobteresa) seemed too tight to his home-care nurse last week after leaving here, with bulging edema above that, so I relayed the message that the proximal several turns of the wrap could be unwrapped and more loosely rewrapped. Additional ulcer(s) noted? yes. T-spine wound. A couple of tiny perineal ulcers are open again, nothing at the ischial tuberosities; left pretib skin tears almost healed; tiny adjacent bullous lesion near healed left heel ulcer is still a bit fragile. His current medication list consists of Insulin Pen Needle, Insulin Syringe-Needle U-100, apixaban, aspirin, blood glucose test strips, cetirizine, collagenase, cyclobenzaprine, docusate sodium, ferrous sulfate, insulin glargine, insulin lispro, lisinopril, magnesium oxide, metFORMIN, metoprolol succinate, nitroGLYCERIN, nystatin, oxyCODONE, pantoprazole, polyethylene glycol, promethazine, rosuvastatin, senna, torsemide, and traZODone. Allergies: Benadryl [diphenhydramine hcl]; Statins [statins];  Cephalexin; Morphine; Penicillins; and Sulfa wound care plan will be palliative from here on. Knee needs a different dressing plan, I think, to decrease the chance of trauma on dressing removal; left shin and heel and perineum primarily just need better ongoing offloading. Left great toe, I have some possible ischemic concerns since that central tissue continues to get discolored and necrotic despite no pressure, no trauma, good moisture balance, no obvious infection (plus the fact that he has known vascular disease elsewhere). Factors contributing to occurrence and/or persistence of the chronic ulcer include lymphedema, diabetes, chronic pressure, decreased mobility, shear force and possibly PAD / hypoxia. Medical necessity of today's visit is shown by the above documentation. Sharp debridement is indicated today, based upon the exam findings in the wound(s) above. Procedure note:     Consent obtained. Time out performed per [facility] policy. Anesthetic  Anesthetic: 4% Lidocaine Cream     Using a curette, I sharply debrided the left, great toe ulcer(s) down through and including the removal of subcutaneous tissue. The type(s) of tissue debrided included fibrin and necrotic/eschar. Total Surface Area Debrided: 1 sq cm. The ulcers were then irrigated with normal saline solution. The procedure was completed with a small amount of bleeding, and hemostasis was with pressure. The patient tolerated the procedure well, with no significant complications. The patient's level of pain during and after the procedure was monitored. Post-debridement measurements, if different from pre-debridement, are in the flowsheet as well.     Discharge plan:     Treatment in the wound care center today, per RN documentation: Wound 08/16/17 #27, Thoracic spine CLUSTER, dehisced surgical wound, full thickness, onset 8/16/2017-Dressing/Treatment: (vashe calmoseptine opticel ag drawtex mepilex border)  Wound 04/10/19 #49, Right Knee Anterior - DWLE, Mccauley 2, (onset 2018), gradually appeared-Dressing/Treatment: (vashe adaptic, then cast padding, offloading foam, cast padding wrap, Ace wraps, \"J\" Dermafit)  Wound 12/11/19 #56, Left Great Toe, Diabetic, Mccauley 2, Onset 12/6/19-Dressing/Treatment: (triad, saline moistened gauze, dry dressing). For other areas not listed above: a bit of Betadine and foam to the left heel and left lateral leg, Adaptic to the left shin, Calmoseptine to the few small perineal erosions. Per physician order, left application of multilayer compression wrap was performed in the wound care center today, to help manage edema, stasis dermatitis, and/or venous ulcers. Leave primary dressing and multi-layer wrap(s) in place until the next appointment. Also discussed ways to keep the wrap dry for a shower, including a plastic cast-guard, available in retail pharmacies. He should call before his next visit if there is any significant pain, significant strike-through of drainage to the surface of the wrap, or any significant sense of the wrap sliding down more than an inch or so, bunching-up and abrading his skin. Backed off on compression from an Unna boot and Coban to a Compri2 Lite wrap this week, because of toe ischemia concerns. (also, we won't be using the Unna patch periwound on the right knee any longer). Will order a left lower extremity arterial Duplex, to see how his perfusion to the foot is. Sending in paperwork to request Medicaid waiver coverage of a LLE Velcro compression wrap for the long term, once those LLE ulcers are more durably healed. I reminded the patient of the importance of weight management and smoking cessation, if applicable; also encouraged ambulation as tolerated, additional lower extremity exercises as instructed in our education sheet, leg elevation when at rest, and compliance with any recommended dietary, diuretic and compression therapies. Home treatment: good handwashing before and after any dressing changes.  Cleanse wound with saline or soap & water before dressing change. May use Vaseline (petrolatum), Aquaphor, Aveeno, CeraVe, Cetaphil, Eucerin, Lubriderm, etc for dry skin. Dressing type for home: as above for the T-spine and perineum at least three times per week; change to periwound ZnO, Santyl, gauze dressing to the left great toe, daily if possible. Written discharge instructions given to patient. Follow up in 1 week.     Electronically signed by Elias Cheng MD on 2/27/2020 at 12:30 PM.

## 2020-03-04 ENCOUNTER — HOSPITAL ENCOUNTER (OUTPATIENT)
Dept: WOUND CARE | Age: 55
Discharge: HOME | End: 2020-03-04
Payer: MEDICARE

## 2020-03-04 VITALS
HEIGHT: 74 IN | SYSTOLIC BLOOD PRESSURE: 118 MMHG | DIASTOLIC BLOOD PRESSURE: 60 MMHG | HEART RATE: 72 BPM | BODY MASS INDEX: 32.77 KG/M2 | TEMPERATURE: 97 F | RESPIRATION RATE: 20 BRPM

## 2020-03-04 PROCEDURE — 17250 CHEM CAUT OF GRANLTJ TISSUE: CPT | Performed by: INTERNAL MEDICINE

## 2020-03-04 PROCEDURE — 29581 APPL MULTLAYER CMPRN SYS LEG: CPT

## 2020-03-04 PROCEDURE — 11043 DBRDMT MUSC&/FSCA 1ST 20/<: CPT

## 2020-03-04 PROCEDURE — 11043 DBRDMT MUSC&/FSCA 1ST 20/<: CPT | Performed by: INTERNAL MEDICINE

## 2020-03-04 PROCEDURE — 17250 CHEM CAUT OF GRANLTJ TISSUE: CPT

## 2020-03-04 RX ORDER — LIDOCAINE 40 MG/G
CREAM TOPICAL ONCE
Status: DISCONTINUED | OUTPATIENT
Start: 2020-03-04 | End: 2020-03-05 | Stop reason: HOSPADM

## 2020-03-04 ASSESSMENT — PAIN SCALES - GENERAL: PAINLEVEL_OUTOF10: 0

## 2020-03-04 NOTE — FLOWSHEET NOTE
Calmoseptine applied to right isheal and scrotal area. Betadine applied to left heel. Betadine to linear healed area adaptic to left pretib and compri 2 lite applied to left lower leg.

## 2020-03-04 NOTE — PLAN OF CARE
Toe wound debrided per MD, will cont. With Santyl as ordered. Await arterial studies in the next few weeks. Left leg edema stable, cont. With compri 2 lite compression wrap for edema control. Awaiting approval of compression garment through waiver. Right stump wound stable, Ag Nitrate used per MD, will stop foam, cont. With adaptic, cast padding & light ace wrap for protection. Back wound stable without change, no debridement, cont. With current wound care regime. Scrotum with superficial area open, cont. With Calmoseptine as ordered. Pt. States he has not been out of bed & his power chair is broken. Pt. Cont. To off-load as ordered. F/u in [facility] in 1 week as ordered. Discharge instructions reviewed with patient, all questions answered, copy given to patient. Dressings were applied to all wounds per M.D. Instructions at this visit.

## 2020-03-05 RX ORDER — TRAZODONE HYDROCHLORIDE 50 MG/1
TABLET ORAL
Qty: 90 TABLET | Refills: 0 | Status: SHIPPED | OUTPATIENT
Start: 2020-03-05 | End: 2020-05-15 | Stop reason: SDUPTHER

## 2020-03-06 RX ORDER — APIXABAN 5 MG/1
TABLET, FILM COATED ORAL
Qty: 180 TABLET | Refills: 0 | Status: SHIPPED | OUTPATIENT
Start: 2020-03-06 | End: 2020-05-15 | Stop reason: SDUPTHER

## 2020-03-07 NOTE — PROGRESS NOTES
the left, great toe ulcer(s) down through and including the removal of muscle/fascia. The type(s) of tissue debrided included fibrin, slough and necrotic/eschar. Total Surface Area Debrided: 1 sq cm. The ulcers were then irrigated with normal saline solution. The procedure was completed with a small amount of bleeding, and hemostasis was with pressure. The patient tolerated the procedure well, with no significant complications. The patient's level of pain during and after the procedure was monitored. Post-debridement measurements, if different from pre-debridement, are in the flowsheet as well.  _________    To encourage better epithelial cell coverage, I did use AgNO3 to chemically cauterize hypergranulation tissue on the right knee ulcer(s), after application of 4% lidocaine topical solution. This was tolerated well, with no pain or skin injury. Discharge plan:     Treatment in the wound care center today, per RN documentation: Wound 12/11/19 #56, Left Great Toe, Diabetic, Mccauley 2, Onset 12/6/19-Dressing/Treatment: (zinc oxide triad moistened gauze dry dressing)  Wound 04/10/19 #49, Right Knee Anterior - DWLE, Mcca[place]y 2, (onset 2018), gradually appeared-Dressing/Treatment: (vashe hydrogel adaptic cast padding, light Ace, Spandagrip) -- backing off of compression there, and did not place foam this week, since foam edges were causing some linear preulcerative damage to the periwound skin there. Wound 08/16/17 #27, Thoracic spine CLUSTER, dehisced surgical wound, full thickness, onset 8/16/2017-Dressing/Treatment: (vashe calmoseptine silver alginate drawtex mepilex border)  Wound 03/04/20 #58. Scrotum, traumatic, partial thickness, onset 3/3/20-Dressing/Treatment: (calmoseptine). Per physician order, left application of multilayer compression wrap was performed in the wound care center today, to help manage edema, stasis dermatitis, and/or venous ulcers.  Leave primary dressing and multi-layer wrap(s) in place until the next appointment. Also discussed ways to keep the wrap dry for a shower, including a plastic cast-guard, available in retail pharmacies. He should call before his next visit if there is any significant pain, significant strike-through of drainage to the surface of the wrap, or any significant sense of the wrap sliding down more than an inch or so, bunching-up and abrading his skin. I reminded the patient of the importance of weight management and smoking cessation, if applicable; also encouraged ambulation as tolerated, additional lower extremity exercises as instructed in our education sheet, leg elevation when at rest, and compliance with any recommended dietary, diuretic and compression therapies. Keep up the good work with offloading, position changes in bed, minimal chair time, glucose control, protein intake. When being turned, important not to push against the soft tissues near the right hip, as that is causing some tension and shear stress in the right ischial area, causing that skin tissue to split open. Await arterial Duplex in a couple of weeks. Home treatment: good handwashing before and after any dressing changes. Cleanse wound with saline or soap & water before dressing change. May use Vaseline (petrolatum), Aquaphor, Aveeno, CeraVe, Cetaphil, Eucerin, Lubriderm, etc for dry skin. Dressing type for home: as above for the T-spine and perineum TIW; for the left great toe, ZnO, Santyl and a dry dressing, once daily. Written discharge instructions given to patient. Follow up in 1 week.     Electronically signed by Peggy Marks MD on 3/7/2020 at 10:02 AM.

## 2020-03-11 ENCOUNTER — HOSPITAL ENCOUNTER (OUTPATIENT)
Dept: WOUND CARE | Age: 55
Discharge: HOME | End: 2020-03-11
Payer: MEDICARE

## 2020-03-11 VITALS
HEART RATE: 67 BPM | BODY MASS INDEX: 32.81 KG/M2 | SYSTOLIC BLOOD PRESSURE: 125 MMHG | TEMPERATURE: 96.5 F | DIASTOLIC BLOOD PRESSURE: 75 MMHG | HEIGHT: 74 IN | WEIGHT: 255.7 LBS | RESPIRATION RATE: 18 BRPM

## 2020-03-11 PROCEDURE — 17250 CHEM CAUT OF GRANLTJ TISSUE: CPT | Performed by: INTERNAL MEDICINE

## 2020-03-11 PROCEDURE — 29581 APPL MULTLAYER CMPRN SYS LEG: CPT

## 2020-03-11 PROCEDURE — 11043 DBRDMT MUSC&/FSCA 1ST 20/<: CPT | Performed by: INTERNAL MEDICINE

## 2020-03-11 PROCEDURE — 17250 CHEM CAUT OF GRANLTJ TISSUE: CPT

## 2020-03-11 PROCEDURE — 11043 DBRDMT MUSC&/FSCA 1ST 20/<: CPT

## 2020-03-11 RX ORDER — LIDOCAINE 40 MG/G
CREAM TOPICAL ONCE
Status: DISCONTINUED | OUTPATIENT
Start: 2020-03-11 | End: 2020-03-12 | Stop reason: HOSPADM

## 2020-03-11 ASSESSMENT — PAIN SCALES - GENERAL: PAINLEVEL_OUTOF10: 0

## 2020-03-12 RX ORDER — NITROGLYCERIN 0.4 MG/1
TABLET SUBLINGUAL
Qty: 25 TABLET | Refills: 2 | OUTPATIENT
Start: 2020-03-12

## 2020-03-15 NOTE — PROGRESS NOTES
some hyperkeratosis, not as purple as it had been though; ischial and buttock skin mild erythema, still one area of a shear-related linear skin defect at the upper right buttock; left great toe pink, mildly indurated. Ulcer(s): T-spine stable hardware exposure, inflamed granulation, small sinus tract with a bit of purulence; right knee small, red, hypergranular; left great toe more granular, less deep necrosis, still a bit of tendon necrosis, I think less depth, some fibrin and biofilm, no bone exposure. Photos also saved in electronic chart. Today's wound measurements, per RN documentation:  Wound 12/11/19 #56, Left Great Toe, Diabetic, Mccauley 2, Onset 12/6/19-Wound Length (cm): 1.1 cm  Wound 04/10/19 #49, Right Knee Anterior - DWLE, Mcca[place]y 2, (onset 2018), gradually appeared-Wound Length (cm): 0.6 cm  Wound 08/16/17 #27, Thoracic spine CLUSTER, dehisced surgical wound, full thickness, onset 8/16/2017-Wound Length (cm): 4.5 cm  [REMOVED] Wound 03/04/20 #58. Scrotum, traumatic, partial thickness, onset 3/3/20-Wound Length (cm): 0 cm    Wound 12/11/19 #56, Left Great Toe, Diabetic, Mccauley 2, Onset 12/6/19-Wound Width (cm): 1 cm  Wound 04/10/19 #49, Right Knee Anterior - DWLE, Mcca[place]y 2, (onset 2018), gradually appeared-Wound Width (cm): 1 cm  Wound 08/16/17 #27, Thoracic spine CLUSTER, dehisced surgical wound, full thickness, onset 8/16/2017-Wound Width (cm): 1 cm  [REMOVED] Wound 03/04/20 #58. Scrotum, traumatic, partial thickness, onset 3/3/20-Wound Width (cm): 0 cm    Wound 12/11/19 #56, Left Great Toe, Diabetic, Mccauley 2, Onset 12/6/19-Wound Depth (cm): 0.4 cm  Wound 04/10/19 #49, Right Knee Anterior - DWLE, Mcca[place]y 2, (onset 2018), gradually appeared-Wound Depth (cm): 0.1 cm  Wound 08/16/17 #27, Thoracic spine CLUSTER, dehisced surgical wound, full thickness, onset 8/16/2017-Wound Depth (cm): 0.7 cm  [REMOVED] Wound 03/04/20 #58.  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    LIBORIO on CPAP G47.33, Z99.89    Below knee amputation status, right [id number]    Diabetic ulcer of right knee associated with type 2 diabetes mellitus, limited to breakdown of skin ([facility] [address] E11.622, L97.911    Diabetic ulcer of toe of left foot associated with type 2 diabetes mellitus, with necrosis of muscle ([facility]) E11.621, L97.523       Assessment of today's active condition(s): DM2, paraplegia, two main remaining ulcers that we are trying to heal (with T-spine care being palliative) -- no definite PAD in the past, but awaiting a LLE Duplex given how slow healing has been at the great toe; at the knee, I think we just need to continue to try to balance moist wound care, mild Rx of hypergranulation, very mild compression and protection. Factors contributing to occurrence and/or persistence of the chronic ulcer include lymphedema, diabetes and obesity. Medical necessity of today's visit is shown by the above documentation. Sharp debridement is indicated today, based upon the exam findings in the wound(s) above. Procedure note:     Consent obtained. Time out performed per [facility] policy.     Anesthetic  Anesthetic: 4% Lidocaine Cream     Using a curette, #15 blade scalpel and forceps, I sharply debrided the left, great toe ulcer(s) down through and including the removal of muscle/fascia. The type(s) of tissue debrided included fibrin, biofilm and necrotic/eschar. Total Surface Area Debrided: 1 sq cm. The ulcers were then irrigated with normal saline solution. The procedure was completed with a small amount of bleeding, and hemostasis was with pressure. The patient tolerated the procedure well, with no significant complications. The patient's level of pain during and after the procedure was monitored. Post-debridement measurements, if different from pre-debridement, are in the flowsheet as well.  _______________    To encourage better epithelial cell coverage, I did use AgNO3 to chemically cauterize hypergranulation tissue on the right knee ulcer(s), after application of 4% lidocaine topical solution. This was tolerated well, with no pain or skin injury. Discharge plan:     Treatment in the wound care center today, per RN documentation: Wound 12/11/19 #56, Left Great Toe, Diabetic, Mcca[place]y 2, Onset 12/6/19-Dressing/Treatment: (zinc triad dry dressing)  Wound 04/10/19 #49, Right Knee Anterior - DWLE, M[place]y 2, (onset 2018), gradually appeared-Dressing/Treatment: (vashe hydrogel sorbact) -- then also cast padding, foam, loose Ace, Spandagrip. Wound 08/16/17 #27, Thoracic spine CLUSTER, dehisced surgical wound, full thickness, onset 8/16/2017-Dressing/Treatment: (vashe calmoseptine silver alginate drawtex mepilex border). Per physician order, left application of multilayer compression wrap was performed in the wound care center today, to help manage edema, stasis dermatitis, and/or venous ulcers. Leave primary dressing and multi-layer wrap(s) in place until the next appointment. Also discussed ways to keep the wrap dry for a shower, including a plastic cast-guard, available in retail pharmacies.  He should call before his next visit if there is any significant pain, significant strike-through of drainage to the surface of the wrap, or any

## 2020-03-18 ENCOUNTER — HOSPITAL ENCOUNTER (OUTPATIENT)
Dept: VASCULAR LAB | Age: 55
Discharge: HOME | End: 2020-03-18
Payer: MEDICARE

## 2020-03-18 ENCOUNTER — HOSPITAL ENCOUNTER (OUTPATIENT)
Dept: WOUND CARE | Age: 55
Discharge: HOME | End: 2020-03-18
Payer: MEDICARE

## 2020-03-18 VITALS
DIASTOLIC BLOOD PRESSURE: 82 MMHG | HEART RATE: 68 BPM | HEIGHT: 74 IN | TEMPERATURE: 97.2 F | BODY MASS INDEX: 32.73 KG/M2 | RESPIRATION RATE: 18 BRPM | SYSTOLIC BLOOD PRESSURE: 128 MMHG | WEIGHT: 255 LBS

## 2020-03-18 PROCEDURE — 29581 APPL MULTLAYER CMPRN SYS LEG: CPT

## 2020-03-18 PROCEDURE — 11042 DBRDMT SUBQ TIS 1ST 20SQCM/<: CPT | Performed by: INTERNAL MEDICINE

## 2020-03-18 PROCEDURE — 93926 LOWER EXTREMITY STUDY: CPT

## 2020-03-18 PROCEDURE — 11042 DBRDMT SUBQ TIS 1ST 20SQCM/<: CPT

## 2020-03-18 RX ORDER — LIDOCAINE 40 MG/G
CREAM TOPICAL ONCE
Status: DISCONTINUED | OUTPATIENT
Start: 2020-03-18 | End: 2020-03-19 | Stop reason: HOSPADM

## 2020-03-18 ASSESSMENT — PAIN SCALES - GENERAL: PAINLEVEL_OUTOF10: 0

## 2020-03-19 PROBLEM — S91.209A TRAUMATIC AVULSION OF NAIL PLATE OF TOE: Status: ACTIVE | Noted: 2020-03-19

## 2020-03-19 NOTE — PROGRESS NOTES
[facility] Progress Note    Kay Santos     : 1965    DATE OF VISIT:  3/18/2020    Subjective:     Kay Santos is a 48 y.o. male who has a diabetic ulcer located on the left, great toe. Significant symptoms or pertinent wound history since last visit: feeling well overall, no F/C/D, no SOB or CP. Doing well with offloading, has not been out of bed to his chair this week. No word yet on his new bed, no word yet on a long-term compression garment for his LLE yet. Getting LLE arterial Duplex today, because of slow healing of that great toe ulcer. Additional ulcer(s) noted? yes. T-spine surgical site; right knee diabetic ulcer (improving); a new area of shear ulceration at the perineum again; and then it appears that his left 3rd toe nail was traumatically avulsed, exposing an open nail bed. My staff tell me that the foot did not appear like that when he left the [facility] for his Duplex, but did appear like that when he returned (I only saw him after the study was done). His current medication list consists of Insulin Pen Needle, Insulin Syringe-Needle U-100, apixaban, aspirin, blood glucose test strips, cetirizine, cyclobenzaprine, docusate sodium, ferrous sulfate, insulin glargine, insulin lispro, lisinopril, magnesium oxide, metFORMIN, metoprolol succinate, nitroGLYCERIN, nystatin, oxyCODONE, pantoprazole, polyethylene glycol, promethazine, rosuvastatin, senna, torsemide, and traZODone. Allergies: Benadryl [diphenhydramine hcl]; Statins [statins];  Cephalexin; Morphine; Penicillins; and Sulfa antibiotics    Objective:     Vitals:    20 1126   BP: 128/82   Pulse: 68   Resp: 18   Temp: 97.2 °F (36.2 °C)   TempSrc: Oral   Weight: 255 lb (115.7 kg)   Height: 6' 2\" (1.88 m)     AAOx3, overweight, a bit fatigued, NAD  Dopplerable distal pulses, foot a bit cool, cap refill a bit slow  Mild right and mild-mod left lower extremity stage 2 lymphedema  No cellulitis, Spandagrip. Wound 08/16/17 #27, Thoracic spine CLUSTER, dehisced surgical wound, full thickness, onset 8/16/2017-Dressing/Treatment: (vashe calmoseptine silver alginate drawtex mepilex border)  Wound 03/18/20 #59, Left 3rd Toe nailbed, Trauma, Partial Thickness, Onset 3/18/20-Dressing/Treatment: (antibiotic ointment dry dressing)  Wound 03/18/20 #60, Scrotum - Posterior, Pressure Injury, Stage 2, Onset 3/13/20-Dressing/Treatment: (calmoseptine). Per physician order, left application of multilayer compression wrap was performed in the wound care center today, to help manage edema, stasis dermatitis, and/or venous ulcers. Leave primary dressing and multi-layer wrap(s) in place until the next appointment. Also discussed ways to keep the wrap dry for a shower, including a plastic cast-guard, available in retail pharmacies. He should call before his next visit if there is any significant pain, significant strike-through of drainage to the surface of the wrap, or any significant sense of the wrap sliding down more than an inch or so, bunching-up and abrading his skin. I reminded the patient of the importance of weight management and smoking cessation, if applicable; also encouraged ambulation as tolerated, additional lower extremity exercises as instructed in our education sheet, leg elevation when at rest, and compliance with any recommended dietary, diuretic and compression therapies. Await arterial Duplex results. Keep up the good work with offloading, glucose control, protein intake. Apart from the Calmoseptine right where the perineal shear injury is, I would continue to minimize the amount of material that is against his skin there - no other ZnO, no antifungal creams, no plastic-backed Depends garment or Chux, no occlusive dressings, etc -- his skin has overall improved since we started doing that.  Can use talcum powder (or antifungal powder) if moisture starts to become excessive, if it is gently washed off and reapplied daily, and can use InterDry in the inguinal folds. Home treatment: good handwashing before and after any dressing changes. Cleanse wound with saline or soap & water before dressing change. May use Vaseline (petrolatum), Aquaphor, Aveeno, CeraVe, Cetaphil, Eucerin, Lubriderm, etc for dry skin. Dressing type for home: as above for the T-spine TIW; as above for the left 3rd toe daily; ZnO, Santyl and gauze to the left great toe daily; as above for the scrotal-perineal area daily or as needed. Written discharge instructions given to patient. Follow up in 1 week. We discussed the relative risks and benefits of continuing usual weekly follow-up versus something less often, given the coronavirus outbreak. Mr. Diana Dc would prefer to continue to come weekly, since he's made as much progress as he has, and doesn't want to regress. I think that is reasonable for now, and we can always reconsider over time, if the situation should evolve (e.g., if coronavirus becomes widespread in this immediate area).     Electronically signed by Peggy Marks MD on 3/19/2020 at 9:24 AM.

## 2020-03-25 ENCOUNTER — HOSPITAL ENCOUNTER (OUTPATIENT)
Dept: WOUND CARE | Age: 55
Discharge: HOME | End: 2020-03-25
Payer: MEDICARE

## 2020-03-25 VITALS
TEMPERATURE: 97.2 F | RESPIRATION RATE: 18 BRPM | SYSTOLIC BLOOD PRESSURE: 112 MMHG | HEIGHT: 74 IN | BODY MASS INDEX: 32.74 KG/M2 | DIASTOLIC BLOOD PRESSURE: 74 MMHG | HEART RATE: 69 BPM

## 2020-03-25 PROBLEM — I73.9 PAD (PERIPHERAL ARTERY DISEASE) (HCC): Status: ACTIVE | Noted: 2020-03-25

## 2020-03-25 PROBLEM — L97.525 DIABETIC ULCER OF TOE OF LEFT FOOT ASSOCIATED WITH TYPE 2 DIABETES MELLITUS, WITH MUSCLE INVOLVEMENT WITHOUT EVIDENCE OF NECROSIS (HCC): Status: ACTIVE | Noted: 2020-01-20

## 2020-03-25 PROBLEM — L03.312 CELLULITIS OF BACK: Status: ACTIVE | Noted: 2020-03-25

## 2020-03-25 PROCEDURE — 87077 CULTURE AEROBIC IDENTIFY: CPT

## 2020-03-25 PROCEDURE — 99214 OFFICE O/P EST MOD 30 MIN: CPT | Performed by: INTERNAL MEDICINE

## 2020-03-25 PROCEDURE — 87070 CULTURE OTHR SPECIMN AEROBIC: CPT

## 2020-03-25 PROCEDURE — 87186 SC STD MICRODIL/AGAR DIL: CPT

## 2020-03-25 PROCEDURE — 17250 CHEM CAUT OF GRANLTJ TISSUE: CPT

## 2020-03-25 PROCEDURE — 11042 DBRDMT SUBQ TIS 1ST 20SQCM/<: CPT

## 2020-03-25 PROCEDURE — 87205 SMEAR GRAM STAIN: CPT

## 2020-03-25 PROCEDURE — 11042 DBRDMT SUBQ TIS 1ST 20SQCM/<: CPT | Performed by: INTERNAL MEDICINE

## 2020-03-25 RX ORDER — LIDOCAINE HYDROCHLORIDE 40 MG/ML
SOLUTION TOPICAL ONCE
Status: DISCONTINUED | OUTPATIENT
Start: 2020-03-25 | End: 2020-03-26 | Stop reason: HOSPADM

## 2020-03-25 RX ORDER — CIPROFLOXACIN 500 MG/1
500 TABLET, FILM COATED ORAL EVERY 12 HOURS
Qty: 14 TABLET | Refills: 0 | Status: SHIPPED | OUTPATIENT
Start: 2020-03-25 | End: 2020-03-27

## 2020-03-25 RX ORDER — LINEZOLID 600 MG/1
600 TABLET, FILM COATED ORAL EVERY 12 HOURS
Qty: 14 TABLET | Refills: 0 | Status: SHIPPED | OUTPATIENT
Start: 2020-03-25 | End: 2020-04-01

## 2020-03-25 ASSESSMENT — PAIN SCALES - GENERAL: PAINLEVEL_OUTOF10: 0

## 2020-03-26 NOTE — PROGRESS NOTES
[facility] Progress Note    Kip Garnett     : 1965    DATE OF VISIT:  3/25/2020    Subjective:     Kip Garnett is a 48 y.o. male who has a dehisced surgical ulcer located on the back (midline). Current complaint of pain in this ulcer? no.    Other significant symptoms or pertinent ulcer history: drainage there is stable in amount, moderate, but cloudy and tan today, with malodor noticed in the last couple of days. Has a bit more periwound redness again also. No F/C/D, not feeling systemically ill, but glucoses are higher the last few days (high 100s as opposed to usual low 100s). No CP, SOB, dizziness, no N/V/D. He got his compression garment in the mail today (actually got two leg garments, two foot and ankle garments, and several pairs of silver noncompressive liners). Hopes to be getting his new bed on Friday. Additional ulcer(s) noted? yes. Right knee flared up again a bit this week; left great toe slowly better; perineal ulcer a bit better again; left 3rd toe starting to heal; left leg and heel more durably healed now.     Mr. Luis Ott has a past medical history of Acute blood loss anemia, Acute MI ([address] Acute on chronic systolic CHF (congestive heart failure) ([address] Acute osteomyelitis of left foot ([address] Bloodstream infection due to Port-A-Cath, CAD (coronary artery disease), Candidal dermatitis, Cellulitis and abscess of left leg, except foot, Cellulitis of right buttock, Cellulitis of right knee, Chronic osteomyelitis of left foot ([address] Chronic osteomyelitis of left ischium ([address] Chronic osteomyelitis of right foot with draining sinus ([address] COPD (chronic obstructive pulmonary disease) ([address] Decubitus ulcer of left ischium, stage 4 ([address] Diabetes mellitus ([address] Diabetic foot ulcer with osteomyelitis ([address] Discitis of lumbosacral region, DVT of lower extremity, bilateral ([address] ESBL (extended spectrum beta-lactamase) producing bacteria pr amputation metatarsal+toe,single (Right, 7/26/2018); pr split grft,head,fac,hand,feet <100sqcm (Bilateral, 7/30/2018); Abdomen surgery; Cosmetic surgery; Endoscopy, colon, diagnostic; Insertable Cardiac Monitor; pr lenka skn sub grft t/a/l area/<100scm /<1st 25 scm (Right, 9/27/2018); Leg amputation below knee (Right, 01/15/2019); Leg amputation below knee (Right, 1/15/2019); Tunneled venous port placement (Right, 01/17/2019); and INSERTION / REMOVAL / REPLACEMENT VENOUS ACCESS CATHETER (Right, 1/17/2019). His family history includes Arthritis in his mother; Cancer in his father and mother; Diabetes in his father and mother; Early Death in his father; Heart Disease in his father and maternal grandmother; High Blood Pressure in his maternal uncle and mother; High Cholesterol in his father and mother; Kidney Disease in his maternal uncle; [employer] / [place] in his mother. Mr. Elvira Martin reports that he has never smoked. He has never used smokeless tobacco. He reports that he does not drink alcohol or use drugs. His current medication list consists of Insulin Pen Needle, Insulin Syringe-Needle U-100, apixaban, aspirin, blood glucose test strips, cetirizine, cyclobenzaprine, docusate sodium, ferrous sulfate, insulin glargine, insulin lispro, lisinopril, magnesium oxide, metFORMIN, metoprolol succinate, nitroGLYCERIN, nystatin, oxyCODONE, pantoprazole, polyethylene glycol, promethazine, rosuvastatin, senna, torsemide, and traZODone. Allergies: Benadryl [diphenhydramine hcl]; Statins [statins]; Cephalexin; Morphine; Penicillins; and Sulfa antibiotics    Pertinent items from the review of systems are discussed in the HPI; the remainder of the ROS was reviewed and is negative.      Objective:     Vitals:    03/25/20 1039   BP: 112/74   Pulse: 69   Resp: 18   Temp: 97.2 °F (36.2 °C)   TempSrc: Oral   Height: 6' 2\" (1.88 m)       Constitutional:  well-developed, well-nourished, overweight, a bit fatigued, against the patella. Wound 08/16/17 #27, Thoracic spine CLUSTER, dehisced surgical wound, full thickness, onset 8/16/2017-Dressing/Treatment: (vashe calmoseptine silver alginate drawtex mepilex border). After probing that sinus tract at his T-spine wound and draining a small amount of blood and pus, I sent fluid for a Gram stain and Cx. Empiric Cipro 500mg q12 and Zyvox 600mg q12 for a week, while awaiting Cxs. Hold trazodone while taking Zyvox. Be sure to separate iron and magnesium from Cipro doses by at least a couple of hours. I'll call him when Cx results are back. Starting long-term compression plan on the LLE this week -- cotton stockinette (so that toe wound care can happen daily), foot and ankle wrap, then lower leg wrap. Continue good work with offloading, position changes, use of ROHO boot, continue good efforts at glucose control and protein intake. Await delivery of new bed and mattress. Home treatment: good handwashing before and after any dressing changes. Cleanse ulcer with saline or soap & water before dressing change. May use Vaseline (petrolatum), Aquaphor, Aveeno, CeraVe, Cetaphil, Eucerin, Lubriderm, etc for dry skin. Dressing type for home: as above for the T-spine TIW, and for the perineum, left great toe and 3rd toe, once daily. Written discharge instructions given to patient. Follow up in 1 week.     Electronically signed by Kenny Ravi MD on 3/25/2020 at 9:06 PM.

## 2020-03-27 LAB
GRAM STAIN RESULT: ABNORMAL
ORGANISM: ABNORMAL
ORGANISM: ABNORMAL
WOUND/ABSCESS: ABNORMAL
WOUND/ABSCESS: ABNORMAL

## 2020-03-27 RX ORDER — CEFDINIR 300 MG/1
300 CAPSULE ORAL 2 TIMES DAILY
Qty: 14 CAPSULE | Refills: 0 | Status: SHIPPED | OUTPATIENT
Start: 2020-03-27 | End: 2020-04-08 | Stop reason: ALTCHOICE

## 2020-04-01 ENCOUNTER — HOSPITAL ENCOUNTER (OUTPATIENT)
Dept: WOUND CARE | Age: 55
Discharge: HOME | End: 2020-04-01
Payer: MEDICARE

## 2020-04-01 VITALS
SYSTOLIC BLOOD PRESSURE: 99 MMHG | RESPIRATION RATE: 16 BRPM | HEART RATE: 65 BPM | TEMPERATURE: 98.1 F | DIASTOLIC BLOOD PRESSURE: 59 MMHG

## 2020-04-01 LAB
GLUCOSE BLD-MCNC: 78 MG/DL (ref 70–99)
PERFORMED ON: NORMAL

## 2020-04-01 PROCEDURE — 11042 DBRDMT SUBQ TIS 1ST 20SQCM/<: CPT

## 2020-04-01 PROCEDURE — 11042 DBRDMT SUBQ TIS 1ST 20SQCM/<: CPT | Performed by: INTERNAL MEDICINE

## 2020-04-01 RX ORDER — LIDOCAINE HYDROCHLORIDE 40 MG/ML
SOLUTION TOPICAL ONCE
Status: DISCONTINUED | OUTPATIENT
Start: 2020-04-01 | End: 2020-04-02 | Stop reason: HOSPADM

## 2020-04-01 ASSESSMENT — PAIN SCALES - GENERAL: PAINLEVEL_OUTOF10: 0

## 2020-04-03 PROBLEM — S91.209A TRAUMATIC AVULSION OF NAIL PLATE OF TOE: Status: RESOLVED | Noted: 2020-03-19 | Resolved: 2020-04-03

## 2020-04-03 PROBLEM — L03.312 CELLULITIS OF BACK: Status: RESOLVED | Noted: 2020-03-25 | Resolved: 2020-04-03

## 2020-04-03 NOTE — PROGRESS NOTES
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    LIBORIO on CPAP G47.33, Z99.89    Below knee amputation status, right [id number]    Diabetic ulcer of right knee associated with type 2 diabetes mellitus, limited to breakdown of skin ([facility] [address] E11.622, L97.911    Diabetic ulcer of toe of left foot associated with type 2 diabetes mellitus, with muscle involvement without evidence of necrosis (HCC) E11.621, L97.525    PAD (peripheral artery disease) ([facility]) I73.9       Assessment of today's active condition(s): DM, paraplegia, multiple nonhealing wounds -- T-spine care palliative, soft tissue infection improving, and I think mainly that sinus tract is what needs a bit of attention there; right knee finally looking very close to being healed; perineum getting close to being healed again, and I'm hopeful healing can be more durable now with the new bed and mattress; left 3rd toe should be healed soon; left hallux a little concerning today, with some signs of increased ischemia, though the wound itself looks better actually; I think this might have just been from too-snug compression with the new [place] wrap. Factors contributing to occurrence and/or persistence of the chronic ulcer include lymphedema, diabetes, chronic pressure, decreased mobility, shear force, obesity and decreased tissue oxygenation. Medical necessity of today's visit is shown by the above documentation. Sharp debridement is indicated today, based upon the exam findings in the wound(s) above. Procedure note:     Consent obtained. Time out performed per [facility] policy. Anesthetic  Anesthetic: 4% Lidocaine Cream     Using a curette and forceps, I sharply debrided the back (midline) ulcer(s) down through and including the removal of subcutaneous tissue. The type(s) of tissue debrided included necrotic/eschar, exudate and clots. Total Surface Area Debrided: 1 sq cm. The ulcers were then irrigated with normal saline solution.  The procedure was completed with a small amount of bleeding, and hemostasis was with pressure. The patient tolerated the procedure well, with no significant complications. The patient's level of pain during and after the procedure was monitored. Post-debridement measurements, if different from pre-debridement, are in the flowsheet as well. Discharge plan:     Treatment in the wound care center today, per RN documentation: Wound 12/11/19 #56, Left Great Toe, Diabetic, Mccauley 2, Onset 12/6/19-Dressing/Treatment: (triad dry dressing)  Wound 03/18/20 #59, Left 3rd Toe nailbed, Trauma, Partial Thickness, Onset 3/18/20-Dressing/Treatment: (antibiotic ointment dry dressing)  Wound 04/10/19 #49, Right Knee Anterior - DWLE, Mccauley 2, (onset 2018), gradually appeared-Dressing/Treatment: (Vashe,Vaseline gauze, cast padding, foam, Spandagrip)  Wound 08/16/17 #27, Thoracic spine CLUSTER, dehisced surgical wound, full thickness, onset 8/16/2017-Dressing/Treatment: (Vashe,Calmoseptine,Opticell Ag,Drawtex,Mepilex Border)  Wound 03/18/20 #60, Scrotum - Posterior, Pressure Injury, Stage 2, Onset 3/13/20-Dressing/Treatment: (Calmoseptine). Continue to focus on glucose control, protein intake, offloading. Continue ROHO boot on the left. This week for LLE lymphedema, just a single medium Spandagrip, and see if that improves hallux periwound color, temp and cap refill. If not, and if the ulcer itself looks more ischemic and stagnant, will need to refer for consideration of angio. Complete Zyvox and Omnicef as prescribed. Off Cipro now. Home treatment: good handwashing before and after any dressing changes. Cleanse wound with saline or soap & water before dressing change. May use Vaseline (petrolatum), Aquaphor, Aveeno, CeraVe, Cetaphil, Eucerin, Lubriderm, etc for dry skin.      Dressing type for home: as above for the T-spine TIW, for the perineum and left 3rd toe, once daily, and back to Multidex for the left great toe, once

## 2020-04-06 RX ORDER — LISINOPRIL 5 MG/1
5 TABLET ORAL DAILY
Qty: 90 TABLET | Refills: 0 | Status: SHIPPED | OUTPATIENT
Start: 2020-04-06 | End: 2020-05-15 | Stop reason: SDUPTHER

## 2020-04-06 RX ORDER — TORSEMIDE 20 MG/1
TABLET ORAL
Qty: 90 TABLET | Refills: 0 | Status: SHIPPED | OUTPATIENT
Start: 2020-04-06 | End: 2020-04-08 | Stop reason: SDUPTHER

## 2020-04-08 ENCOUNTER — HOSPITAL ENCOUNTER (OUTPATIENT)
Dept: WOUND CARE | Age: 55
Discharge: HOME | End: 2020-04-08
Payer: MEDICARE

## 2020-04-08 ENCOUNTER — TELEPHONE (OUTPATIENT)
Dept: INTERNAL MEDICINE CLINIC | Age: 55
End: 2020-04-08

## 2020-04-08 VITALS
TEMPERATURE: 98.6 F | HEART RATE: 66 BPM | RESPIRATION RATE: 18 BRPM | SYSTOLIC BLOOD PRESSURE: 100 MMHG | DIASTOLIC BLOOD PRESSURE: 64 MMHG

## 2020-04-08 PROCEDURE — 97597 DBRDMT OPN WND 1ST 20 CM/<: CPT | Performed by: INTERNAL MEDICINE

## 2020-04-08 PROCEDURE — 97597 DBRDMT OPN WND 1ST 20 CM/<: CPT

## 2020-04-08 PROCEDURE — 29581 APPL MULTLAYER CMPRN SYS LEG: CPT

## 2020-04-08 RX ORDER — TORSEMIDE 20 MG/1
TABLET ORAL
Qty: 180 TABLET | Refills: 0 | Status: SHIPPED | OUTPATIENT
Start: 2020-04-08 | End: 2020-05-15 | Stop reason: SDUPTHER

## 2020-04-08 RX ORDER — LIDOCAINE 40 MG/G
CREAM TOPICAL ONCE
Status: DISCONTINUED | OUTPATIENT
Start: 2020-04-08 | End: 2020-04-09 | Stop reason: HOSPADM

## 2020-04-08 ASSESSMENT — PAIN SCALES - GENERAL: PAINLEVEL_OUTOF10: 0

## 2020-04-08 NOTE — PLAN OF CARE
Left great toe with some improvement noted, debridement per MD, will cont. With current wound care regime. Cap refill on left foot about 4 seconds today which is much improved from last week, foot remains cool. Will change to using compri 2 lite on left leg for edema control. Left 3rd toe wound healed. Right knee much improved this week & almost healed, no debridement, cont. With current wound care regime. Back & scrotal wounds stable without much change, no debridement, cont. With current wound care regime. F/u in [facility] in 1 week as ordered. Discharge instructions reviewed with patient, all questions answered, copy given to patient. Dressings were applied to all wounds per M.D. Instructions at this visit.

## 2020-04-09 NOTE — PROGRESS NOTES
indurated at T-spine; slowly looking a bit more substantial, less crusted exudate, less pink-purple discoloration at right knee; persistent changes of friction at perineum (pink-red, indurated, hyperkeratosis, epidermal projections oriented in a direction to suggest that he's sliding down in bed when the [facility] is up). Ulcer(s): T-spine stable hardware exposure, inflamed granulation, small adjacent sinus tract, small amount of bloody-purulent drainage;  right knee very nearly healed, just a tiny flap of epidermis over a small residual ulcer; perineal ulcer smaller, red, a bit inflamed, signs of friction; 3rd nailbed healed; left great toe mostly granular, I think a bit smaller again, still small amount of exposed but healthy-appearing tendon, a bit of fibrin and biofilm. Photos also saved in electronic chart.     Today's wound measurements, per RN documentation:  Wound 12/11/19 #56, Left Great Toe, Diabetic, Mccauley 2, Onset 12/6/19-Wound Length (cm): 0.6 cm  [REMOVED] Wound 03/18/20 #59, Left 3rd Toe nailbed, Trauma, Partial Thickness, Onset 3/18/20-Wound Length (cm): 0 cm  Wound 08/16/17 #27, Thoracic spine CLUSTER, dehisced surgical wound, full thickness, onset 8/16/2017-Wound Length (cm): 5.3 cm  Wound 03/18/20 #60, Scrotum - Posterior, Pressure Injury, Stage 2, Onset 3/13/20-Wound Length (cm): 2.2 cm  Wound 04/10/19 #49, Right Knee Anterior - DWLE, Mccauley 2, (onset 2018), gradually appeared-Wound Length (cm): 0.1 cm    Wound 12/11/19 #56, Left Great Toe, Diabetic, Mccauley 2, Onset 12/6/19-Wound Width (cm): 0.4 cm  [REMOVED] Wound 03/18/20 #59, Left 3rd Toe nailbed, Trauma, Partial Thickness, Onset 3/18/20-Wound Width (cm): 0 cm  Wound 08/16/17 #27, Thoracic spine CLUSTER, dehisced surgical wound, full thickness, onset 8/16/2017-Wound Width (cm): 2.2 cm  Wound 03/18/20 #60, Scrotum - Posterior, Pressure Injury, Stage 2, Onset 3/13/20-Wound Width (cm): 0.4 cm  Wound 04/10/19 #49, Right Knee Anterior - GUANACO Mccauley 2, Posterior, Pressure Injury, Stage 2, Onset 3/13/20-Dressing/Treatment: (calmoseptine)  Wound 04/10/19 #49, Right Knee Anterior - DWLE, Mccauley 2, (onset 2018), gradually appeared-Dressing/Treatment: (vashe, Vaseline gauze, cast padding, foam, Spanda). Left great toe ulcer -- Multidex and dry dressing. Per physician order, left application of multilayer compression wrap was performed in the wound care center today, to help manage edema, stasis dermatitis, and/or venous ulcers. Leave primary dressing and multi-layer wrap(s) in place until the next appointment. Also discussed ways to keep the wrap dry for a shower, including a plastic cast-guard, available in retail pharmacies. He should call before his next visit if there is any significant pain, significant strike-through of drainage to the surface of the wrap, or any significant sense of the wrap sliding down more than an inch or so, bunching-up and abrading his skin. I reminded the patient of the importance of weight management and smoking cessation, if applicable; also encouraged ambulation as tolerated, additional lower extremity exercises as instructed in our education sheet, leg elevation when at rest, and compliance with any recommended dietary, diuretic and compression therapies. Continue ROHO Heal Pad boot for offloading there. Keep up good work with glucose control and protein intake. No Abx right now. Urged him to spend a bit less time with the [facility] up in bed for now -- more time supine or left / right lateral decubitus positions. As that perineal area heals and the ischial skin gets stronger, he'll be able to liberalize time in bed with the [facility] up, and get back to his motorized chair also. Home treatment: good handwashing before and after any dressing changes. Cleanse wound with saline or soap & water before dressing change. May use Vaseline (petrolatum), Aquaphor, Aveeno, CeraVe, Cetaphil, Eucerin, Lubriderm, etc for dry skin.

## 2020-04-10 RX ORDER — PANTOPRAZOLE SODIUM 40 MG/1
TABLET, DELAYED RELEASE ORAL
Qty: 90 TABLET | Refills: 0 | Status: SHIPPED | OUTPATIENT
Start: 2020-04-10 | End: 2020-05-15 | Stop reason: SDUPTHER

## 2020-04-15 ENCOUNTER — HOSPITAL ENCOUNTER (OUTPATIENT)
Dept: WOUND CARE | Age: 55
Discharge: HOME | End: 2020-04-15
Payer: MEDICARE

## 2020-04-15 VITALS
SYSTOLIC BLOOD PRESSURE: 129 MMHG | TEMPERATURE: 98.6 F | RESPIRATION RATE: 21 BRPM | DIASTOLIC BLOOD PRESSURE: 72 MMHG | HEART RATE: 68 BPM

## 2020-04-15 PROCEDURE — 17250 CHEM CAUT OF GRANLTJ TISSUE: CPT | Performed by: INTERNAL MEDICINE

## 2020-04-15 PROCEDURE — 97597 DBRDMT OPN WND 1ST 20 CM/<: CPT

## 2020-04-15 PROCEDURE — 29581 APPL MULTLAYER CMPRN SYS LEG: CPT

## 2020-04-15 PROCEDURE — 17250 CHEM CAUT OF GRANLTJ TISSUE: CPT

## 2020-04-15 PROCEDURE — 97597 DBRDMT OPN WND 1ST 20 CM/<: CPT | Performed by: INTERNAL MEDICINE

## 2020-04-15 RX ORDER — LIDOCAINE 40 MG/G
CREAM TOPICAL ONCE
Status: DISCONTINUED | OUTPATIENT
Start: 2020-04-15 | End: 2020-04-16 | Stop reason: HOSPADM

## 2020-04-15 ASSESSMENT — PAIN SCALES - GENERAL: PAINLEVEL_OUTOF10: 0

## 2020-04-15 NOTE — PLAN OF CARE
Left great toe wound stable, debridement per MD, will cont. With current wound care regime. Will cont. With compression wrap on left leg & hold with compression garment at this time. Right knee wound fragile & stable, will add using betadine, otherwise cont. With current wound care regime. Back & scrotal wound stable, no debridement, cont. With current wound care regime. F/u in [facility] in 1 week as ordered. Discharge instructions reviewed with patient, all questions answered, copy given to patient. Dressings were applied to all wounds per M.D. Instructions at this visit.

## 2020-04-16 NOTE — PROGRESS NOTES
stable draining sinus tract, maybe a bit more exposure of superior screw; perineal ulcer smaller, red, less inflamed; right knee primary ulcer just about delicately healed, one small nearby focus with some epidermal loss from excessive moisture; left great toe with some peripheral skin necrosis and mild undermining, a good deal of clean, red, granulation, still a small amount of exposed but healthy-appearing tendon. Photos also saved in electronic chart.     Today's wound measurements, per RN documentation:  Wound 04/10/19 #49, Right Knee Anterior - DWLE, Mccauley 2, (onset 2018), gradually appeared-Wound Length (cm): 0.1 cm  Wound 08/16/17 #27, Thoracic spine CLUSTER, dehisced surgical wound, full thickness, onset 8/16/2017-Wound Length (cm): 5 cm  Wound 03/18/20 #60, Scrotum - Posterior, Pressure Injury, Stage 2, Onset 3/13/20-Wound Length (cm): 1.5 cm  Wound 12/11/19 #56, Left Great Toe, Diabetic, Mccauley 2, Onset 12/6/19-Wound Length (cm): 0.6 cm    Wound 04/10/19 #49, Right Knee Anterior - DWLE, Mccauley 2, (onset 2018), gradually appeared-Wound Width (cm): 0.1 cm  Wound 08/16/17 #27, Thoracic spine CLUSTER, dehisced surgical wound, full thickness, onset 8/16/2017-Wound Width (cm): 2.5 cm  Wound 03/18/20 #60, Scrotum - Posterior, Pressure Injury, Stage 2, Onset 3/13/20-Wound Width (cm): 0.5 cm  Wound 12/11/19 #56, Left Great Toe, Diabetic, Mccauley 2, Onset 12/6/19-Wound Width (cm): 0.3 cm    Wound 04/10/19 #49, Right Knee Anterior - DWLE, Mccauley 2, (onset 2018), gradually appeared-Wound Depth (cm): 0.1 cm  Wound 08/16/17 #27, Thoracic spine CLUSTER, dehisced surgical wound, full thickness, onset 8/16/2017-Wound Depth (cm): 0.8 cm  Wound 03/18/20 #60, Scrotum - Posterior, Pressure Injury, Stage 2, Onset 3/13/20-Wound Depth (cm): 0.1 cm  Wound 12/11/19 #56, Left Great Toe, Diabetic, Mccauley 2, Onset 12/6/19-Wound Depth (cm): 0.3 cm    Assessment:     Patient Active Problem List   Diagnosis Code    Mixed hyperlipidemia

## 2020-04-22 ENCOUNTER — HOSPITAL ENCOUNTER (OUTPATIENT)
Dept: WOUND CARE | Age: 55
Discharge: HOME | End: 2020-04-22
Payer: MEDICARE

## 2020-04-22 VITALS
HEART RATE: 66 BPM | TEMPERATURE: 98 F | RESPIRATION RATE: 18 BRPM | DIASTOLIC BLOOD PRESSURE: 66 MMHG | SYSTOLIC BLOOD PRESSURE: 103 MMHG

## 2020-04-22 PROCEDURE — 11043 DBRDMT MUSC&/FSCA 1ST 20/<: CPT

## 2020-04-22 PROCEDURE — 29581 APPL MULTLAYER CMPRN SYS LEG: CPT

## 2020-04-22 PROCEDURE — 11043 DBRDMT MUSC&/FSCA 1ST 20/<: CPT | Performed by: INTERNAL MEDICINE

## 2020-04-22 RX ORDER — LIDOCAINE 40 MG/G
CREAM TOPICAL ONCE
Status: DISCONTINUED | OUTPATIENT
Start: 2020-04-22 | End: 2020-04-23 | Stop reason: HOSPADM

## 2020-04-22 ASSESSMENT — PAIN SCALES - GENERAL
PAINLEVEL_OUTOF10: 0
PAINLEVEL_OUTOF10: 0

## 2020-04-22 NOTE — DISCHARGE INSTR - COC
Culture, Wound        foot    Resolved    ESBL (Extended Spectrum Beta Lactamase)  02/06/17 02/06/17 Roshni Arango, RN   07/23/18 Roshni Arango, RN    + ESBL  Cx Urine/Foot  9/27/17    MDRO (multi-drug resistant organism)  02/06/17 02/06/17 Roshni Arango, RN   02/06/17 Roshni Arango, RN    MRSA  10/30/15 10/30/15 Sherrie Samaniego, RN   07/23/18 Roshni Arango, RN    + MRSA Cx 8/23/17 foot    MRSA  03/10/14 03/10/14 Roshni Arango, RN   10/30/15 Sherrie Samaniego, RN          Nurse Assessment:  Last Vital Signs: /66   Pulse 66   Temp 98 °F (36.7 °C) (Oral)   Resp 18     Last documented pain score (0-10 scale): Pain Level: 0  Last Weight:   Wt Readings from Last 1 Encounters:   03/18/20 255 lb (115.7 kg)     Mental Status:  {IP PT MENTAL STATUS:47070}    IV Access:  { KISHAN IV ACCESS:565808028}    Nursing Mobility/ADLs:  Walking   {[facility] IZOH:064852575}  Transfer  {[facility] DME IRVI:722848500}  Bathing  {[facility] DME FBEB:301455597}  Dressing  {[facility] DME RENK:389661337}  Toileting  {[facility] DME RTBO:559900098}  Feeding  {[facility] XFFJ:442623329}  Med Admin  {[facility] XXNK:217260615}  Med Delivery   {[facility] MED Delivery:934847531}    Wound Care Documentation and Therapy:  Wound 08/16/17 #27, Thoracic spine CLUSTER, dehisced surgical wound, full thickness, onset 8/16/2017 (Active)   Wound Image   1/29/2020 10:42 AM   Wound Other 4/22/2020 11:06 AM   Dressing Status Clean;Dry; Intact 4/15/2020 12:21 PM   Dressing Changed Changed/New 4/15/2020 12:21 PM   Wound Cleansed Wound cleanser 4/22/2020 11:06 AM   Wound Length (cm) 5 cm 4/22/2020 11:06 AM   Wound Width (cm) 3.5 cm 4/22/2020 11:06 AM   Wound Depth (cm) 1 cm 4/22/2020 11:06 AM   Wound Surface Area (cm^2) 17.5 cm^2 4/22/2020 11:06 AM   Change in Wound Size % (l*w) 45.31 4/22/2020 11:06 AM   Wound Volume (cm^3) 17.5 cm^3 4/22/2020 11:06 AM   Wound Healing % 64 4/22/2020 11:06 AM   Post-Procedure Length (cm) 4.6 cm 4/1/2020 11:39 AM   Post-Procedure Width (cm) 2.4 cm 4/1/2020 11:39 AM Post-Procedure Depth (cm) 1 cm 4/1/2020 11:39 AM   Post-Procedure Surface Area (cm^2) 11.04 cm^2 4/1/2020 11:39 AM   Post-Procedure Volume (cm^3) 11.04 cm^3 4/1/2020 11:39 AM   Distance Tunneling (cm) 0 cm 4/8/2020 10:58 AM   Tunneling Position ___ O'Clock 0 4/8/2020 10:58 AM   Undermining Starts ___ O'Clock 0 4/8/2020 10:58 AM   Undermining Ends___ O'Clock 0 4/8/2020 10:58 AM   Undermining Maxium Distance (cm) 0 4/8/2020 10:58 AM   Wound Assessment Red 4/22/2020 11:06 AM   Drainage Amount Small 4/22/2020 11:06 AM   Drainage Description Serosanguinous 4/22/2020 11:06 AM   Odor None 4/22/2020 11:06 AM   Margins Defined edges 3/11/2020 10:36 AM   Chetna-wound Assessment Red 4/22/2020 11:06 AM   Culture Taken Yes 3/25/2020 11:35 AM   Number of days: 980       Wound 04/10/19 #49, Right Knee Anterior - DWLE, Mccauley 2, (onset 2018), gradually appeared (Active)   Wound Image   1/29/2020 10:42 AM   Wound Diabetic Mccauley 2 4/22/2020 10:57 AM   Offloading for Diabetic Foot Ulcers Other (comment) 1/29/2020 12:20 PM   Dressing Status Clean;Dry; Intact 4/15/2020 12:21 PM   Dressing Changed Changed/New 4/15/2020 12:21 PM   Wound Cleansed Not Cleansed 4/22/2020 10:57 AM   Wound Width (cm) 0.1 cm 4/15/2020 11:33 AM   Wound Depth (cm) 0.1 cm 4/15/2020 11:33 AM   Wound Surface Area (cm^2) 0.01 cm^2 4/15/2020 11:33 AM   Change in Wound Size % (l*w) 99.36 4/15/2020 11:33 AM   Wound Volume (cm^3) 0 cm^3 4/15/2020 11:33 AM   Wound Healing % 100 4/15/2020 11:33 AM   Post-Procedure Length (cm) 1.3 cm 11/13/2019 11:23 AM   Post-Procedure Width (cm) 4.8 cm 11/13/2019 11:23 AM   Post-Procedure Depth (cm) 0.2 cm 11/13/2019 11:23 AM   Post-Procedure Surface Area (cm^2) 6.24 cm^2 11/13/2019 11:23 AM   Post-Procedure Volume (cm^3) 1.25 cm^3 11/13/2019 11:23 AM   Distance Tunneling (cm) 0 cm 4/15/2020 11:33 AM   Tunneling Position ___ O'Clock 00 2/5/2020 10:46 AM   Undermining Starts ___ O'Clock 0 2/5/2020 10:46 AM   Undermining Ends___ O'Clock 0 Feeding: {[facility] DME Other Feedings:273543213}  Liquids: {Slp liquid thickness:34457}  Daily Fluid Restriction: {[facility] DME Yes amt example:500350499}  Last Modified Barium Swallow with Video (Video Swallowing Test): {Done Not Done UEXI:918078363}    Treatments at the Time of Hospital Discharge:   Respiratory Treatments: ***  Oxygen Therapy:  {Therapy; copd oxygen:16890}  Ventilator:    {[facility] Vent SHCZ:911322206}    Rehab Therapies: {THERAPEUTIC INTERVENTION:9227502420}  Weight Bearing Status/Restrictions: {[facility] Weight Bearin}  Other Medical Equipment (for information only, NOT a DME order):  {EQUIPMENT:902333430}  Other Treatments: ***    Patient's personal belongings (please select all that are sent with patient):  {[facility] DME Belongings:400488066}    RN SIGNATURE:  {Esignature:010091607}    CASE MANAGEMENT/SOCIAL WORK SECTION    Inpatient Status Date: ***    Readmission Risk Assessment Score:  Readmission Risk              Risk of Unplanned Readmission:        0           Discharging to Facility/ Agency   · Name:   · Address:  · Phone:  · Fax:    Dialysis Facility (if applicable)   · Name:  · Address:  · Dialysis Schedule:  · Phone:  · Fax:    / signature: {Esignature:[id number]}    PHYSICIAN SECTION    Prognosis: {Prognosis:6713183045}    Condition at Discharge: [address] [place] [address] Patient Condition:151573696}    Rehab Potential (if transferring to Rehab): {Prognosis:1382535306}    Recommended Labs or Other Treatments After Discharge: ***    Physician Certification: I certify the above information and transfer of Arturo Grant  is necessary for the continuing treatment of the diagnosis listed and that he requires {Admit to Appropriate Level of Care:79882} for {GREATER/LESS:934892712} 30 days.      Update Admission H&P: [facility]}    PHYSICIAN SIGNATURE:  {Esignature:441871831}

## 2020-04-23 NOTE — PROGRESS NOTES
[facility] Progress Note    Mayur Whitehead     : 1965    DATE OF VISIT:  2020    Subjective:     Mayur Whitehead is a 48 y.o. male who has a diabetic ulcer located on the left, great toe. Significant symptoms or pertinent wound history since last visit: feeling well overall, glucose up occasionally, but episodes are brief, and he thinks just diet-related (not sustained hyperglycemia that he's had in the past as an early sign of infection). Did well with weekly wrap again this week. No F/C/D, no SOB or cough or CP, no N/V. Spending less time with [facility] significantly elevated in bed. Additional ulcer(s) noted? yes. T-spine surgical site, right knee diabetic ulcer, perineal pressure / friction ulcer. His current medication list consists of Insulin Pen Needle, Insulin Syringe-Needle U-100, apixaban, aspirin, blood glucose test strips, cetirizine, cyclobenzaprine, docusate sodium, ferrous sulfate, insulin glargine, insulin lispro, lisinopril, magnesium oxide, metFORMIN, metoprolol succinate, nitroGLYCERIN, nystatin, oxyCODONE, pantoprazole, polyethylene glycol, promethazine, rosuvastatin, senna, torsemide, and traZODone. Allergies: Benadryl [diphenhydramine hcl]; Statins [statins];  Cephalexin; Morphine; Penicillins; and Sulfa antibiotics    Objective:     Vitals:    20 1100   BP: 103/66   Pulse: 66   Resp: 18   Temp: 98 °F (36.7 °C)   TempSrc: Oral   Height: Comment: no scale bed     AAOx3, overweight, NAD  Dopplerable distal pulses, foot warm, cap refill borderline at left foot (3-5 secs)  Mild-mod LLE stage 2 lymphedema  No cellulitis, angitis, fluctuance  No acute arthritis or bursitis  Chetna-ulcer skin: T-spine a bit more moist erythema again this week; right knee very slowly more substantial looking periwound skin, more pink, less petechial, fewer small foci of erosion and crusted exudate; left great toe pink, indurated, mild hyperkeratosis; perineum pink, Using a #15 blade scalpel and forceps, I sharply debrided the left, great toe ulcer(s) down through and including the removal of muscle/fascia. The type(s) of tissue debrided included fibrin and necrotic/eschar. Total Surface Area Debrided: 1 sq cm, also excising a small rim of overhanging epidermal tissue that was causing a bit of undermining. The ulcers were then irrigated with normal saline solution. The procedure was completed with a small amount of bleeding, and hemostasis was with pressure. The patient tolerated the procedure well, with no significant complications. The patient's level of pain during and after the procedure was monitored. Post-debridement measurements, if different from pre-debridement, are in the flowsheet as well. Discharge plan:     Treatment in the wound care center today, per RN documentation: Wound 12/11/19 #56, Left Great Toe, Diabetic, Mccauley 2, Onset 12/6/19-Dressing/Treatment: (Calmoseptine, PSO, Bandaid)  Wound 04/10/19 #49, Right Knee Anterior - DWLE, Mccauley 2, (onset 2018), gradually appeared-Dressing/Treatment: (betadine, Sorbact, cast padding, foam, Spandagrip)  Wound 03/18/20 #60, Scrotum - Posterior, Pressure Injury, Stage 2, Onset 3/13/20-Dressing/Treatment: (calmoseptine)  Wound 08/16/17 #27, Thoracic spine CLUSTER, dehisced surgical wound, full thickness, onset 8/16/2017-Dressing/Treatment: (calmoseptine silver alginate drawtex x2 mepilex border). Per physician order, left application of multilayer compression wrap was performed in the wound care center today, to help manage edema, stasis dermatitis, and/or venous ulcers. Leave primary dressing and multi-layer wrap(s) in place until the next appointment. Also discussed ways to keep the wrap dry for a shower, including a plastic cast-guard, available in retail pharmacies.  He should call before his next visit if there is any significant pain, significant strike-through of drainage to the surface of the wrap, or

## 2020-04-29 ENCOUNTER — HOSPITAL ENCOUNTER (OUTPATIENT)
Dept: WOUND CARE | Age: 55
Discharge: HOME | End: 2020-04-29
Payer: MEDICARE

## 2020-04-29 VITALS
BODY MASS INDEX: 32.74 KG/M2 | DIASTOLIC BLOOD PRESSURE: 65 MMHG | SYSTOLIC BLOOD PRESSURE: 109 MMHG | HEART RATE: 64 BPM | RESPIRATION RATE: 16 BRPM | TEMPERATURE: 98.2 F | WEIGHT: 255 LBS

## 2020-04-29 PROCEDURE — 17250 CHEM CAUT OF GRANLTJ TISSUE: CPT | Performed by: INTERNAL MEDICINE

## 2020-04-29 PROCEDURE — 97597 DBRDMT OPN WND 1ST 20 CM/<: CPT

## 2020-04-29 PROCEDURE — 29581 APPL MULTLAYER CMPRN SYS LEG: CPT

## 2020-04-29 PROCEDURE — 17250 CHEM CAUT OF GRANLTJ TISSUE: CPT

## 2020-04-29 PROCEDURE — 97597 DBRDMT OPN WND 1ST 20 CM/<: CPT | Performed by: INTERNAL MEDICINE

## 2020-04-29 RX ORDER — LIDOCAINE HYDROCHLORIDE 40 MG/ML
SOLUTION TOPICAL ONCE
Status: DISCONTINUED | OUTPATIENT
Start: 2020-04-29 | End: 2020-04-30 | Stop reason: HOSPADM

## 2020-04-29 ASSESSMENT — PAIN SCALES - GENERAL: PAINLEVEL_OUTOF10: 0

## 2020-04-29 NOTE — FLOWSHEET NOTE
Polysporin and dry dressing to left medial foot. 100% cotton, compri 2 lite, and spandagrip applied to left lower leg.

## 2020-05-02 NOTE — PROGRESS NOTES
including the removal of dermis. The type(s) of tissue debrided included fibrin and necrotic/eschar. Total Surface Area Debrided: 1 sq cm. The ulcers were then irrigated with normal saline solution. The procedure was completed with a small amount of bleeding, and hemostasis was with pressure. The patient tolerated the procedure well, with no significant complications. The patient's level of pain during and after the procedure was monitored. Post-debridement measurements, if different from pre-debridement, are in the flowsheet as well.  ______________    To encourage better epithelial cell coverage, I did use AgNO3 to chemically cauterize hypergranulation tissue on the back (midline) ulcer(s), after application of 4% lidocaine topical solution. This was tolerated well, with no pain or skin injury. Discharge plan:     Treatment in the wound care center today, per RN documentation: [REMOVED] Wound 04/10/19 #49, Right Knee Anterior - DWLE, Mccauley 2, (onset 2018), gradually appeared-Dressing/Treatment: (betadine, Sorbact, cast padding, foam, Spandagrip -- leave in place for the week)  Wound 03/18/20 #60, Scrotum - Posterior, Pressure Injury, Stage 2, Onset 3/13/20-Dressing/Treatment: (Calmoseptine, mepilex border)  Wound 12/11/19 #56, Left Great Toe, Diabetic, Mccauley 2, Onset 12/6/19-Dressing/Treatment: (triad Multidex dry dressing)  Wound 08/16/17 #27, Thoracic spine CLUSTER, dehisced surgical wound, full thickness, onset 8/16/2017-Dressing/Treatment: (vashe triamcinolone calmoseptine silver alginate drawtex ). Per physician order, left application of multilayer compression wrap was performed in the wound care center today, to help manage edema, stasis dermatitis, and/or venous ulcers. Leave primary dressing and multi-layer wrap(s) in place until the next appointment. Also discussed ways to keep the wrap dry for a shower, including a plastic cast-guard, available in retail pharmacies.  He should call before his

## 2020-05-06 ENCOUNTER — HOSPITAL ENCOUNTER (OUTPATIENT)
Dept: WOUND CARE | Age: 55
Discharge: HOME | End: 2020-05-06
Payer: MEDICARE

## 2020-05-06 VITALS
TEMPERATURE: 98.5 F | DIASTOLIC BLOOD PRESSURE: 64 MMHG | SYSTOLIC BLOOD PRESSURE: 106 MMHG | HEART RATE: 64 BPM | RESPIRATION RATE: 16 BRPM | WEIGHT: 255 LBS | BODY MASS INDEX: 32.74 KG/M2

## 2020-05-06 PROCEDURE — 29581 APPL MULTLAYER CMPRN SYS LEG: CPT | Performed by: INTERNAL MEDICINE

## 2020-05-06 PROCEDURE — 17250 CHEM CAUT OF GRANLTJ TISSUE: CPT | Performed by: INTERNAL MEDICINE

## 2020-05-06 PROCEDURE — 11042 DBRDMT SUBQ TIS 1ST 20SQCM/<: CPT

## 2020-05-06 PROCEDURE — 17250 CHEM CAUT OF GRANLTJ TISSUE: CPT

## 2020-05-06 PROCEDURE — 29581 APPL MULTLAYER CMPRN SYS LEG: CPT

## 2020-05-06 PROCEDURE — 11042 DBRDMT SUBQ TIS 1ST 20SQCM/<: CPT | Performed by: INTERNAL MEDICINE

## 2020-05-06 RX ORDER — LIDOCAINE HYDROCHLORIDE 40 MG/ML
SOLUTION TOPICAL ONCE
Status: DISCONTINUED | OUTPATIENT
Start: 2020-05-06 | End: 2020-05-07 | Stop reason: HOSPADM

## 2020-05-06 ASSESSMENT — PAIN DESCRIPTION - ONSET: ONSET: ON-GOING

## 2020-05-06 ASSESSMENT — PAIN DESCRIPTION - PAIN TYPE: TYPE: CHRONIC PAIN

## 2020-05-06 ASSESSMENT — PAIN SCALES - GENERAL
PAINLEVEL_OUTOF10: 3
PAINLEVEL_OUTOF10: 0

## 2020-05-06 ASSESSMENT — PAIN DESCRIPTION - LOCATION: LOCATION: SHOULDER

## 2020-05-06 ASSESSMENT — PAIN DESCRIPTION - PROGRESSION: CLINICAL_PROGRESSION: NOT CHANGED

## 2020-05-06 ASSESSMENT — PAIN DESCRIPTION - FREQUENCY: FREQUENCY: CONTINUOUS

## 2020-05-06 ASSESSMENT — PAIN DESCRIPTION - ORIENTATION: ORIENTATION: LEFT

## 2020-05-06 ASSESSMENT — PAIN - FUNCTIONAL ASSESSMENT: PAIN_FUNCTIONAL_ASSESSMENT: PREVENTS OR INTERFERES SOME ACTIVE ACTIVITIES AND ADLS

## 2020-05-06 ASSESSMENT — PAIN DESCRIPTION - DESCRIPTORS: DESCRIPTORS: BURNING

## 2020-05-08 NOTE — PROGRESS NOTES
increased inflamed red hypergranulation around the superior screw, minor hypergranulation around the inferior screw, and then that adjacent sinus tract is a bit more open this week, which is good, but there's also some more fat necrosis which could potentially block drainage; perineal ulcer very small, red, clean, superficial; right knee also very small, pink-red, superficial, granular to slightly hypergranular; left forefoot just about healed; left great toe red, granular, smaller, no exposed tendon, still just a bit of depth along one edge. Photos also saved in electronic chart.     Today's wound measurements, per RN documentation:  Wound 12/11/19 #56, Left Great Toe, Diabetic, Mcca[place] 2, Onset 12/6/19-Wound Length (cm): 0.5 cm  Wound 08/16/17 #27, Thoracic spine CLUSTER, dehisced surgical wound, full thickness, onset 8/16/2017-Wound Length (cm): 4.8 cm  Wound 03/18/20 #60, Scrotum - Posterior, Pressure Injury, Stage 2, Onset 3/13/20-Wound Length (cm): 0.6 cm    Wound 12/11/19 #56, Left Great Toe, Diabetic, Mcca[place]y 2, Onset 12/6/19-Wound Width (cm): 0.5 cm  Wound 08/16/17 #27, Thoracic spine CLUSTER, dehisced surgical wound, full thickness, onset 8/16/2017-Wound Width (cm): 3.6 cm  Wound 03/18/20 #60, Scrotum - Posterior, Pressure Injury, Stage 2, Onset 3/13/20-Wound Width (cm): 0.2 cm    Wound 12/11/19 #56, Left Great Toe, Diabetic, Mccauley 2, Onset 12/6/19-Wound Depth (cm): 0.2 cm  Wound 08/16/17 #27, Thoracic spine CLUSTER, dehisced surgical wound, full thickness, onset 8/16/2017-Wound Depth (cm): 1.1 cm  Wound 03/18/20 #60, Scrotum - Posterior, Pressure Injury, Stage 2, Onset 3/13/20-Wound Depth (cm): 0.1 cm    Assessment:     Patient Active Problem List   Diagnosis Code    Mixed hyperlipidemia E78.2    Coronary artery disease involving native coronary artery of native heart without angina pectoris I25.10    Paraplegia, complete (HCC) G82.21    Chronic back pain M54.9, G89.29    Arthritis M19.90    Infected

## 2020-05-13 ENCOUNTER — HOSPITAL ENCOUNTER (OUTPATIENT)
Dept: WOUND CARE | Age: 55
Discharge: HOME | End: 2020-05-13
Payer: MEDICARE

## 2020-05-13 VITALS
DIASTOLIC BLOOD PRESSURE: 72 MMHG | TEMPERATURE: 97.9 F | HEART RATE: 67 BPM | RESPIRATION RATE: 16 BRPM | SYSTOLIC BLOOD PRESSURE: 118 MMHG

## 2020-05-13 PROCEDURE — 29581 APPL MULTLAYER CMPRN SYS LEG: CPT | Performed by: INTERNAL MEDICINE

## 2020-05-13 PROCEDURE — 29581 APPL MULTLAYER CMPRN SYS LEG: CPT

## 2020-05-13 RX ORDER — LIDOCAINE HYDROCHLORIDE 40 MG/ML
SOLUTION TOPICAL ONCE
Status: DISCONTINUED | OUTPATIENT
Start: 2020-05-13 | End: 2020-05-14 | Stop reason: HOSPADM

## 2020-05-13 ASSESSMENT — PAIN SCALES - GENERAL: PAINLEVEL_OUTOF10: 0

## 2020-05-13 NOTE — PLAN OF CARE
Left great toe stable, no debridement, cont. With current wound care regime. Right knee fragile, cont. With dressing to protect as ordered. Scrotum stable, continues to have signs of shearing. Will stop mepilex border to this area & return to using Calmoseptine to any open areas. Back wound with alin-wound irritation & more exposed hardware this week. Will change to using [address] bordered dressing this week. F/u in [facility] in 1 week as ordered. Discharge instructions reviewed with patient, all questions answered, copy given to patient. Dressings were applied to all wounds per M.D. Instructions at this visit.

## 2020-05-14 NOTE — PROGRESS NOTES
[facility] Progress Note    Juana Meng     : 1965    DATE OF VISIT:  2020    Subjective:     Juana Meng is a 48 y.o. male who has a diabetic ulcer located on the right knee and left. Significant symptoms or pertinent wound history since last visit: he's had a bit of an up-and-down week overall, in terms of his wounds. Systemically, doing fine; no F/C/D, no SOB, cough or CP. Staying in bed, not using his chair, trying to reposition frequently, glucoses generally doing very well. His home-care nurse contacted me over the weekend to report that more spinal hardware was visible (not a surprise, from where I debrided some stubborn fat necrosis last week), that the skin around the back wound was more red again, and also that he had some green \"drainage\" from the skin fissure near his sacrum -- this is just a blind depression of skin tissue, with no actual wound at its base, and just needs more careful and more frequent skin cleansing. Toe seems to be dong well, tolerating compression well, heel staying offloaded. Knee ulcer itself looks better, periwound still evolving as some small crusts lyse away but expose tiny skin erosions. Still some friction changes at the perineum, with or without the Mepilex border. Additional ulcer(s) noted? no.      His current medication list consists of Insulin Pen Needle, Insulin Syringe-Needle U-100, apixaban, aspirin, blood glucose test strips, cetirizine, cyclobenzaprine, docusate sodium, ferrous sulfate, insulin glargine, insulin lispro, lisinopril, magnesium oxide, metFORMIN, metoprolol succinate, nitroGLYCERIN, nystatin, oxyCODONE, pantoprazole, polyethylene glycol, promethazine, rosuvastatin, senna, torsemide, and traZODone. Allergies: Benadryl [diphenhydramine hcl]; Statins [statins];  Cephalexin; Morphine; Penicillins; and Sulfa antibiotics    Objective:     Vitals:    20 1103   BP: 118/72   Pulse: 67 Resp: 16   Temp: 97.9 °F (36.6 °C)   TempSrc: Oral     AAOx3, overweight, NAD  Intact left pedal pulses, foot warm, cap refill a bit slow  Mild-moderate LLE stage 2 lymphedema  No cellulitis, angitis, fluctuance, no acute arthritis or bursitis  Still a bit of a fluctuating rash across his flanks, mostly papular  Chetna-ulcer skin: increased erythema around the T-spine, not cellulitic, with patchy partial denudation, I think from constant contact with drainage; still pink to light red induration and friction changes / epidermal projections at the perineum; mostly pink and flat at the knee, a few small crusts of exudate and silver nitrate residue lysing away revealing tiny erosions; pink and healthy at the left great toe, a tiny rim of hyperkeratosis. Ulcer(s): T-spine with 3 screws now exposed (but at least no sinus tract always on the verge of closing off and trapping infection), with a bit less hypergranulation, but at least moderate drainage, mostly bloody; perineal cluster small, red, no necrosis, just persisting because of shear and friction; right knee very small and superficial, clean, red, granular, delicate; left great toe a bit smaller, red, granular, a bit of loose biofilm, and I think it only looks a bit deeper because of the slightly more prominent epidermal edge. Photos also saved in electronic chart. Today's Wound Measurements, per RN documentation:  Wound 12/11/19 #56, Left Great Toe, Diabetic, Mccauley 2, Onset 12/6/19-Wound Length (cm): 0.4 cm(remeasured per MD)  Wound 08/16/17 #27, Thoracic spine CLUSTER, dehisced surgical wound, full thickness, onset 8/16/2017-Wound Length (cm): 6 cm  Wound 03/18/20 #60, Scrotum - Posterior, Pressure Injury, Stage 2, Onset 3/13/20-Wound Length (cm): 1.5 cm  Right knee ulcer just a few mm in length and width, very superficial, with a few small satellite erosions.     Wound 12/11/19 #56, Left Great Toe, Diabetic, Mccauley 2, Onset 12/6/19-Wound Width (cm): 0.3 until the next appointment. He should call before his next visit if there is any significant pain, significant strike-through of drainage to the surface of the wrap, or any significant sense of the wrap sliding down more than an inch or so, bunching-up and abrading his skin. I reminded the patient of the importance of weight management and smoking cessation, if applicable; also encouraged ambulation as tolerated, additional lower extremity exercises as instructed in our education sheet, leg elevation when at rest, and compliance with any recommended dietary, diuretic and compression therapies. For the perineal skin, I encouraged him to spend as much time lying in a far lateral position as possible, to take pressure off, not give an opportunity for shear (as happens when he is on his back in bed, with the HOB up), and even consider using a fan to dry out his skin and his draw sheet if needed. Keep up with [facility] boot on the left side. Will transition back to [facility] compression wrap when toe ulcer healed. For home --  As above for the T-spine, at least TIW. Leave the knee dressing and wrap in place for the week. As above for the perineum, PRN, with no actual dressing material.    For the toe, just ZnO, Multidex and a dry dressing daily. Written discharge instructions given to patient. Follow up in 1 week.     Electronically signed by Augie Portillo MD on 5/14/2020 at 11:11 AM.

## 2020-05-15 ENCOUNTER — VIRTUAL VISIT (OUTPATIENT)
Dept: INTERNAL MEDICINE CLINIC | Age: 55
End: 2020-05-15

## 2020-05-15 ENCOUNTER — TELEPHONE (OUTPATIENT)
Dept: INTERNAL MEDICINE CLINIC | Age: 55
End: 2020-05-15

## 2020-05-15 PROCEDURE — 99213 OFFICE O/P EST LOW 20 MIN: CPT | Performed by: INTERNAL MEDICINE

## 2020-05-15 RX ORDER — NITROGLYCERIN 0.4 MG/1
TABLET SUBLINGUAL
Qty: 25 TABLET | Refills: 3 | Status: SHIPPED | OUTPATIENT
Start: 2020-05-15 | End: 2021-01-01

## 2020-05-15 RX ORDER — LISINOPRIL 5 MG/1
5 TABLET ORAL DAILY
Qty: 90 TABLET | Refills: 0 | Status: SHIPPED | OUTPATIENT
Start: 2020-05-15 | End: 2020-09-17

## 2020-05-15 RX ORDER — METOPROLOL SUCCINATE 100 MG/1
TABLET, EXTENDED RELEASE ORAL
Qty: 90 TABLET | Refills: 0 | Status: SHIPPED | OUTPATIENT
Start: 2020-05-15 | End: 2020-09-24 | Stop reason: SDUPTHER

## 2020-05-15 RX ORDER — TRAZODONE HYDROCHLORIDE 50 MG/1
TABLET ORAL
Qty: 90 TABLET | Refills: 1 | Status: SHIPPED | OUTPATIENT
Start: 2020-05-15 | End: 2021-01-26 | Stop reason: SDUPTHER

## 2020-05-15 RX ORDER — ROSUVASTATIN CALCIUM 20 MG/1
20 TABLET, COATED ORAL NIGHTLY
Qty: 90 TABLET | Refills: 1 | Status: ON HOLD | OUTPATIENT
Start: 2020-05-15 | End: 2020-09-25 | Stop reason: ALTCHOICE

## 2020-05-15 RX ORDER — TORSEMIDE 20 MG/1
TABLET ORAL
Qty: 180 TABLET | Refills: 0 | Status: SHIPPED | OUTPATIENT
Start: 2020-05-15 | End: 2020-08-31

## 2020-05-15 RX ORDER — PANTOPRAZOLE SODIUM 40 MG/1
TABLET, DELAYED RELEASE ORAL
Qty: 90 TABLET | Refills: 1 | Status: SHIPPED | OUTPATIENT
Start: 2020-05-15 | End: 2020-12-29

## 2020-05-15 RX ORDER — INSULIN GLARGINE 100 [IU]/ML
INJECTION, SOLUTION SUBCUTANEOUS
Qty: 30 ML | Refills: 2 | Status: SHIPPED | OUTPATIENT
Start: 2020-05-15 | End: 2020-07-29

## 2020-05-15 RX ORDER — FERROUS SULFATE 325(65) MG
TABLET ORAL
Qty: 90 TABLET | Refills: 1 | Status: SHIPPED | OUTPATIENT
Start: 2020-05-15 | End: 2022-01-01

## 2020-05-15 NOTE — PROGRESS NOTES
test five times daily. DX:E11.9  Eduardo Hood MD   rosuvastatin (CRESTOR) 20 MG tablet Take 1 tablet by mouth nightly  Eduardo Hood MD   polyethylene glycol (MIRALAX) powder Take 17 g by mouth as needed  Historical Provider, MD   promethazine (PHENERGAN) 25 MG tablet Take 1 tablet by mouth every 6 hours as needed for Nausea  Tammie Pollard MD   nitroGLYCERIN (NITROSTAT) 0.4 MG SL tablet up to max of 3 total doses. If no relief after 1 dose, call 911. [place], APRN - CNP   aspirin 81 MG tablet Take 81 mg by mouth nightly   Historical Provider, MD   cyclobenzaprine (FLEXERIL) 10 MG tablet Take 1 tablet by mouth 2 times daily as needed for Muscle spasms  Vimal Gomez MD   oxyCODONE (ROXICODONE) 5 MG immediate release tablet Tab 1 daily as needed only for severe chest pain.   Vimal Gomez MD       Social History     Tobacco Use    Smoking status: Never Smoker    Smokeless tobacco: Never Used   Substance Use Topics    Alcohol use: No    Drug use: No        Allergies   Allergen Reactions    Benadryl [Diphenhydramine Hcl] Anaphylaxis     Throat swelling    Statins [Statins]     Cephalexin Rash    Morphine Anxiety     Hallucinations     Penicillins Rash    Sulfa Antibiotics Rash   ,   Past Medical History:   Diagnosis Date    Acute blood loss anemia 3/14/2019    Acute MI (HCC)     x 2    Acute on chronic systolic CHF (congestive heart failure) ([address] multiple    including , after PRBCs    Acute osteomyelitis of left foot ([address] 2015    Bloodstream infection due to Port-A-Cath 2014    CAD (coronary artery disease)     Candidal dermatitis 2015    Cellulitis and abscess of left leg, except foot 2015    Cellulitis of right buttock 2018    Cellulitis of right knee 10/29/2019    Chronic osteomyelitis of left foot ([address] 2016    Chronic osteomyelitis of left ischium ([address] 2016    Chronic osteomyelitis of right foot with draining sinus ([address] long-term current use of insulin ([facility]     4. Diabetic ulcer of right knee associated with type 2 diabetes mellitus, with exposure of muscle ([facility]     5. Coronary artery disease involving native coronary artery of native heart without angina pectoris     6. Gitelman syndrome     7. Ischemic cardiomyopathy           chronic systolic CHF/ischemic CM  -  Appears euvolemic. Continue  demadex 20 mg bid   - cont home toprol XL and  acei  5 mg lisinopril  - ECHO with depressed EF as before  -no recent failure symptoms   - f. w [facility] cardiology     CAD s/p stents  - most recent stents 10/2017  - ASA and BB, crestor  - off plavix - 12/18 due to recurrent anemia from bleeding with chronic wounds  - chronically elevated troponin.  No CP  - reports issues with myalgias from various statins - ? REPATHA needed     PE  - on eliquis - continued - no recent bleeding issues       IDDM- with complications  - last E1W at 7  - cont home lantus 50 units bid,  metformin 1000 mg bid -takes SSI only if needed     Chronic wounds to low back, thoracic spine, ischium and right knee   - seen in [facility] by Dr. Flaquito Muir     Paraplegia  Neurogenic bladder  - supportive care  - intermittent self catherizations     Gitelman Syndrome  - Mg supplements daily high doses  400mg  4 itmes daily    F.w as needed    Need blood work    Return in about 4 months (around 9/15/2020) for medicare wellness. Nyasia Villafana is a 48 y.o. male being evaluated by a Virtual Visit (video visit) encounter to address concerns as mentioned above. A caregiver was present when appropriate. Due to this being a TeleHealth encounter (During XTZSD-62 public health emergency), evaluation of the following organ systems was limited: Vitals/Constitutional/EENT/Resp/CV/GI//MS/Neuro/Skin/Heme-Lymph-Imm.   Pursuant to the emergency declaration under the [facility] [address]5 waiver authority and the [facility] Appropriations Act, this Virtual Visit was conducted with patient's (and/or legal guardian's) consent, to reduce the patient's risk of exposure to COVID-19 and provide necessary medical care. The patient (and/or legal guardian) has also been advised to contact this office for worsening conditions or problems, and seek emergency medical treatment and/or call 911 if deemed necessary. Patient identification was verified at the start of the visit: Yes    Total time spent on this encounter: 12 min    Services were provided through a video synchronous discussion virtually to substitute for in-person clinic visit. Patient and provider were located at their individual homes. --Deangelo Moore MD on 5/15/2020 at 1:59 PM    An electronic signature was used to authenticate this note.

## 2020-05-20 ENCOUNTER — HOSPITAL ENCOUNTER (OUTPATIENT)
Dept: WOUND CARE | Age: 55
Discharge: HOME | End: 2020-05-20
Payer: MEDICARE

## 2020-05-20 VITALS
OXYGEN SATURATION: 99 % | HEART RATE: 65 BPM | SYSTOLIC BLOOD PRESSURE: 94 MMHG | TEMPERATURE: 98.2 F | DIASTOLIC BLOOD PRESSURE: 58 MMHG | RESPIRATION RATE: 16 BRPM

## 2020-05-20 PROCEDURE — 29581 APPL MULTLAYER CMPRN SYS LEG: CPT

## 2020-05-20 PROCEDURE — 29581 APPL MULTLAYER CMPRN SYS LEG: CPT | Performed by: INTERNAL MEDICINE

## 2020-05-20 RX ORDER — LIDOCAINE 40 MG/G
CREAM TOPICAL ONCE
Status: DISCONTINUED | OUTPATIENT
Start: 2020-05-20 | End: 2020-05-21 | Stop reason: HOSPADM

## 2020-05-20 ASSESSMENT — PAIN SCALES - GENERAL: PAINLEVEL_OUTOF10: 0

## 2020-05-21 NOTE — PROGRESS NOTES
documentation. Sharp debridement is not indicated today, based upon the exam findings in the wound(s) above. He does have an indication for a weekly compression wrap. Procedure note:     Consent obtained. Time out performed per [facility] policy. Anesthetic  Anesthetic: 4% Lidocaine Cream     After cleansing of the wounds with saline and applying skin-care and/or dressing materials (just Betadine and foam to the left heel), Left unilateral application of a multi-layer compression wrap was performed per physician order, to help manage edema, stasis dermatitis, and/or venous ulcers. The patient's level of pain during and after the procedure was monitored, and is noted in the wound documentation flowsheet. Discharge plan:     Treatment in the wound care center today, per RN documentation: Wound 12/11/19 #56, Left Great Toe, Diabetic, [place]y 2, Onset 12/6/19-Dressing/Treatment: Collagen with Ag, Dry dressing  Wound 03/18/20 #60, Scrotum - Posterior, Pressure Injury, Stage 2, Onset 3/13/20-Dressing/Treatment: Calmoseptine  Wound 08/16/17 #27, Thoracic spine CLUSTER, dehisced surgical wound, full thickness, onset 8/16/2017-Dressing/Treatment: Calmoseptine, Silicone dressing. For the right knee, betadine overall, small pieces of tripled-up Vaseline gauze to the tiny erosions, 2 cast pad, foam, \"J\" Spandagrip. For the left medial knee, just polysporin and a Mepilex border to stay in place for the week. Keep up good work with offloading, glucose control, protein intake. Will get back to [facility] for the LLE as soon as that toe is healed, and the heel looks good. For home,  (A)  Multidex and a dry dressing to the left great toe once daily.      (B)  Generous periwound Calmoseptine to the T-spine; can use Mepilex border for now (TIW), but I'd like to see if we can change to non-bordered [facility] dressings, with a tape that his skin can tolerate; he might have to purchase something like HyTape on his own, if [facility] can't provide that. (C)  Calmoseptine to the perineal area daily or PRN. Leave primary dressing and multi-layer wrap(s) in place until the next appointment. He should call before his next visit if there is any significant pain, significant strike-through of drainage to the surface of the wrap, or any significant sense of the wrap sliding down more than an inch or so, bunching-up and abrading his skin. I reminded the patient of the importance of weight management and smoking cessation, if applicable; also encouraged ambulation as tolerated, additional lower extremity exercises as instructed in our education sheet, leg elevation when at rest, and compliance with any recommended dietary, diuretic and compression therapies. Written discharge instructions given to patient. Follow up in 1 week.     Electronically signed by Faye Trevino MD on 5/21/2020 at 10:22 AM.

## 2020-05-27 ENCOUNTER — HOSPITAL ENCOUNTER (OUTPATIENT)
Dept: WOUND CARE | Age: 55
Discharge: HOME | End: 2020-05-27
Payer: MEDICARE

## 2020-05-27 VITALS
RESPIRATION RATE: 18 BRPM | TEMPERATURE: 98.4 F | SYSTOLIC BLOOD PRESSURE: 134 MMHG | HEART RATE: 71 BPM | DIASTOLIC BLOOD PRESSURE: 66 MMHG

## 2020-05-27 PROCEDURE — 29581 APPL MULTLAYER CMPRN SYS LEG: CPT

## 2020-05-27 PROCEDURE — 29581 APPL MULTLAYER CMPRN SYS LEG: CPT | Performed by: INTERNAL MEDICINE

## 2020-05-27 RX ORDER — LIDOCAINE 40 MG/G
CREAM TOPICAL ONCE
Status: DISCONTINUED | OUTPATIENT
Start: 2020-05-27 | End: 2020-05-28 | Stop reason: HOSPADM

## 2020-05-27 ASSESSMENT — PAIN SCALES - GENERAL: PAINLEVEL_OUTOF10: 0

## 2020-05-27 NOTE — PLAN OF CARE
Left great toe healed, scabbed over, will apply betadine & leave open to air. Will cont. With compression wrap to left leg as ordered for edema control & will plan to switch to pts. Compression garments in the next few weeks. Right knee remains with 3 small fragile areas, no debridement or Ag Nitrate. Will cont. With betadine, then change to using Mepitel One over knee, apply PSO, 4x4, foam & hold in place with NANCY dermafjuan carlos, [facility] to change every other day. Scrotum & Back wounds stable, no debridement, no change in wound care regime. F/u in [facility] in 1 week as ordered. Discharge instructions reviewed with patient, all questions answered, copy given to patient. Dressings were applied to all wounds per M.D. Instructions at this visit.

## 2020-05-28 NOTE — PROGRESS NOTES
[facility] Progress Note    Gely Nicole     : 1965    DATE OF VISIT:  2020    Subjective:     Gely Nicole is a 48 y.o. male who has a diabetic ulcer located on the right knee and left, great toe. Significant symptoms or pertinent wound history since last visit: feeling well overall, no F/C/D, no CP, SOB or cough, no N/V/D, unexplained hyperglycemia. [facility] has still not obtained the [employer]ra[employer] dressings for his back, which I ordered 2 weeks ago. Additional ulcer(s) noted? yes. T-spine dehiscence, tiny perineal friction/shear ulcer, small erosions at left medial knee and left proximal shin, and a very fragile spot at the left heel again. His current medication list consists of Insulin Pen Needle, Insulin Syringe-Needle U-100, apixaban, aspirin, blood glucose test strips, cetirizine, cyclobenzaprine, docusate sodium, ferrous sulfate, insulin glargine, insulin lispro, lisinopril, magnesium oxide, metFORMIN, metoprolol succinate, nitroGLYCERIN, nystatin, oxyCODONE, pantoprazole, polyethylene glycol, promethazine, rosuvastatin, senna, torsemide, and traZODone. Allergies: Benadryl [diphenhydramine hcl]; Statins [statins];  Cephalexin; Morphine; Penicillins; and Sulfa antibiotics    Objective:     Vitals:    20 1045   BP: 134/66   Pulse: 71   Resp: 18   Temp: 98.4 °F (36.9 °C)   TempSrc: Oral     AAOx3, overweight, NAD  Dopplerable left distal pulses; right BK stump warm, well-perfused, cap refill at left foot slow, no cyanosis  Minimal RLE edema and mild-mod LLE stage 2 lymphedema  No cellulitis, angitis, fluctuance  Chetna-ulcer skin: pink to light red at mid-back, but not as moist; perineal skin pink to light red, indurated from lymphedema, still some epidermal changes of friction and shear; right knee fragile, thin, hyperkeratotic; left knee pink and mild induration / mild maceration; left shin benign; left toe tiny rim of hyperkeratosis + the D50.0    Lymphedema of both lower extremities I89.0    Neurogenic bladder N31.9    Neurogenic bowel K59.2    Hypergranulation L92.9    Dehiscence of surgical wound of T-spine T81.31XA    Onychomycosis 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    LIBORIO on CPAP G47.33, Z99.89    Below knee amputation status, right [id number]    Diabetic ulcer of right knee associated with type 2 diabetes mellitus, limited to breakdown of skin ([address] E11.622, L97.911    Diabetic ulcer of toe of left foot associated with type 2 diabetes mellitus, with fat layer exposed ([facility]) E11.621, T99.833    PAD (peripheral artery disease) ([facility]) I73.9       Assessment of today's active condition(s): DM, paraplegia, multiple small and slowly healing wounds from pressure / trauma / effects of DM, plus the T-spine which we're treating in a palliative manner; no signs of clinically significant ischemia to the left foot, since that ulcer is finally (nearly?) healed; no signs of active soft tissue infection. Factors contributing to occurrence and/or persistence of the chronic ulcer include lymphedema, diabetes, chronic pressure, decreased mobility, shear force and obesity. Medical necessity of today's visit is shown by the above documentation. Sharp debridement is not indicated today, based upon the exam findings in the wound(s) above. He does have an indication for a weekly compression wrap. Procedure note:     Consent obtained. Time out performed per [facility] policy. Anesthetic  Anesthetic: 4% Lidocaine Cream     After cleansing of the wounds with saline and applying skin-care and/or dressing materials as listed below, Left unilateral application of a multi-layer compression wrap was performed per physician order, to help manage edema, stasis dermatitis, and/or venous ulcers. The patient's level of pain during and after the procedure was monitored, and is noted in the wound documentation flowsheet.     Discharge overdo compression, since he had signs of distal ischemia previously. Written discharge instructions given to patient. Follow up in 1 week.     Electronically signed by Hunter Vargas MD on 5/28/2020 at 8:34 AM.

## 2020-06-03 ENCOUNTER — HOSPITAL ENCOUNTER (OUTPATIENT)
Dept: WOUND CARE | Age: 55
Discharge: HOME | End: 2020-06-03
Payer: MEDICARE

## 2020-06-03 VITALS
TEMPERATURE: 98.3 F | DIASTOLIC BLOOD PRESSURE: 62 MMHG | HEART RATE: 65 BPM | RESPIRATION RATE: 18 BRPM | SYSTOLIC BLOOD PRESSURE: 102 MMHG | HEIGHT: 74 IN | BODY MASS INDEX: 32.74 KG/M2

## 2020-06-03 PROCEDURE — 29581 APPL MULTLAYER CMPRN SYS LEG: CPT | Performed by: INTERNAL MEDICINE

## 2020-06-03 PROCEDURE — 29581 APPL MULTLAYER CMPRN SYS LEG: CPT

## 2020-06-03 RX ORDER — LIDOCAINE 40 MG/G
CREAM TOPICAL ONCE
Status: DISCONTINUED | OUTPATIENT
Start: 2020-06-03 | End: 2020-06-04 | Stop reason: HOSPADM

## 2020-06-03 ASSESSMENT — PAIN SCALES - GENERAL
PAINLEVEL_OUTOF10: 0
PAINLEVEL_OUTOF10: 0

## 2020-06-03 NOTE — PROGRESS NOTES
projections of friction at the perineum; fragile pink skin at the right knee, mild moist hyperkeratosis at the left knee, hyperkeratosis at the left heel, thin rim of hyperkeratosis at the left great toe. Ulcer(s): T-spine with stable hardware exposure, a bit of superior hypergranulation, moderate serosanguinous-bloody drainage; cluster of partial thickness ulcerations at the right anterior knee and left medial knee; left great toe dry, healed, with purplish color I think from thinly covered hypergranulation + possible recent trauma. Photos also saved in electronic chart. Today's Wound Measurements, per RN documentation:  Wound 03/18/20 #60, Scrotum - Posterior, Pressure Injury, Stage 2, Onset 3/13/20-Wound Length (cm): 0.1 cm  Wound 08/16/17 #27, Thoracic spine CLUSTER, dehisced surgical wound, full thickness, onset 8/16/2017-Wound Length (cm): 5.5 cm    Wound 03/18/20 #60, Scrotum - Posterior, Pressure Injury, Stage 2, Onset 3/13/20-Wound Width (cm): 0.1 cm  Wound 08/16/17 #27, Thoracic spine CLUSTER, dehisced surgical wound, full thickness, onset 8/16/2017-Wound Width (cm): 4.4 cm    Wound 03/18/20 #60, Scrotum - Posterior, Pressure Injury, Stage 2, Onset 3/13/20-Wound Depth (cm): 0.1 cm  Wound 08/16/17 #27, Thoracic spine CLUSTER, dehisced surgical wound, full thickness, onset 8/16/2017-Wound Depth (cm): 0.7 cm    Assessment:     Patient Active Problem List   Diagnosis Code    Mixed hyperlipidemia E78.2    Coronary artery disease involving native coronary artery of native heart without angina pectoris I25.10    Paraplegia, complete ([facility]) G82.21    Chronic back pain M54.9, G89.29    Arthritis M19.90    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    LIBORIO on CPAP G47.33, Z99.89    Below knee amputation status, right [id number]    Diabetic ulcer of right knee associated with type 2 diabetes mellitus, limited to breakdown of skin ([facility] E11.622, L97.911    Diabetic ulcer of toe of left foot associated with type 2 diabetes mellitus, with fat layer exposed ([facility] E11.621, I45.526    PAD (peripheral artery disease) ([facility]) I73.9       Assessment of today's active condition(s): DM, paraplegia, slowly healing scattered ulcers related to pressure, friction, DM, very fragile skin and hyperkeratosis, plus the T-spine surgical dehiscence with chronic hardware exposure. Factors contributing to occurrence and/or persistence of the chronic ulcer include lymphedema, diabetes, chronic pressure, decreased mobility, shear force and obesity. Medical necessity of today's visit is shown by the above documentation. Sharp debridement is not indicated today, based upon the exam findings in the wound(s) above. He does have an indication for a weekly compression wrap. Procedure note:     Consent obtained. Time out performed per [facility] policy. Anesthetic  Anesthetic: 4% Lidocaine Cream     After cleansing of the wounds with saline and applying skin-care and/or dressing materials as listed below, Left unilateral application of a multi-layer compression wrap was performed per physician order, to help manage edema, stasis dermatitis, and/or venous ulcers. The patient's level of pain during and after the procedure was monitored, and is noted in the wound documentation flowsheet.     Discharge plan:     Treatment in the wound care center today, per RN documentation: Wound 03/18/20 #60, Scrotum - Posterior, Pressure Injury, Stage 2, Onset 3/13/20-Dressing/Treatment: (Calmoseptine)  Wound 08/16/17 #27, Thoracic spine CLUSTER, dehisced surgical wound, full thickness, onset 8/16/2017-Dressing/Treatment: (Bri Chan ). Also placed Betadine, Xeroform and a Mepilex border to the left medial knee -- leave for the week; Betadine and foam to left proximal shin and heel -- leave for the week under his compression wrap; Betadine to the left great toe; Betadine, Xeroform, gauze, foam, Spandagrip to the right knee -- leave for the week. Leave primary dressing and multi-layer wrap(s) in place until the next appointment. He should call before his next visit if there is any significant pain, significant strike-through of drainage to the surface of the wrap, or any significant sense of the wrap sliding down more than an inch or so, bunching-up and abrading his skin. Next week, bring Farrow wrap for leg and ankle, and we'll likely stop weekly compression wraps here. I reminded the patient of the importance of weight management and smoking cessation, if applicable; also encouraged ambulation as tolerated, additional lower extremity exercises as instructed in our education sheet, leg elevation when at rest, and compliance with any recommended dietary, diuretic and compression therapies. For home -- As above for the Tspine, TIW. As above for the perineum, daily or PRN. Betadine to the left great toe every 1-2 days. Written discharge instructions given to patient. Follow up in 1 week.     Electronically signed by Augie Portillo MD on 6/3/2020 at 2:35 PM.

## 2020-06-10 ENCOUNTER — HOSPITAL ENCOUNTER (OUTPATIENT)
Dept: WOUND CARE | Age: 55
Discharge: HOME | End: 2020-06-10
Payer: MEDICARE

## 2020-06-10 VITALS
HEART RATE: 66 BPM | DIASTOLIC BLOOD PRESSURE: 61 MMHG | SYSTOLIC BLOOD PRESSURE: 110 MMHG | BODY MASS INDEX: 32.74 KG/M2 | HEIGHT: 74 IN | RESPIRATION RATE: 18 BRPM | TEMPERATURE: 98.1 F

## 2020-06-10 PROCEDURE — 99213 OFFICE O/P EST LOW 20 MIN: CPT | Performed by: INTERNAL MEDICINE

## 2020-06-10 PROCEDURE — 99213 OFFICE O/P EST LOW 20 MIN: CPT

## 2020-06-10 RX ORDER — LIDOCAINE 40 MG/G
CREAM TOPICAL ONCE
Status: DISCONTINUED | OUTPATIENT
Start: 2020-06-10 | End: 2020-06-11 | Stop reason: HOSPADM

## 2020-06-10 ASSESSMENT — PAIN SCALES - GENERAL: PAINLEVEL_OUTOF10: 0

## 2020-06-11 PROBLEM — L97.522 DIABETIC ULCER OF TOE OF LEFT FOOT ASSOCIATED WITH TYPE 2 DIABETES MELLITUS, WITH FAT LAYER EXPOSED (HCC): Status: RESOLVED | Noted: 2020-01-20 | Resolved: 2020-06-11

## 2020-06-11 PROBLEM — E11.621 DIABETIC ULCER OF TOE OF LEFT FOOT ASSOCIATED WITH TYPE 2 DIABETES MELLITUS, WITH FAT LAYER EXPOSED (HCC): Status: RESOLVED | Noted: 2020-01-20 | Resolved: 2020-06-11
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
## 2020-06-17 ENCOUNTER — HOSPITAL ENCOUNTER (OUTPATIENT)
Dept: WOUND CARE | Age: 55
Discharge: HOME | End: 2020-06-17
Payer: MEDICARE

## 2020-06-17 VITALS
BODY MASS INDEX: 32.74 KG/M2 | RESPIRATION RATE: 18 BRPM | HEIGHT: 74 IN | HEART RATE: 64 BPM | TEMPERATURE: 98.5 F | DIASTOLIC BLOOD PRESSURE: 57 MMHG | SYSTOLIC BLOOD PRESSURE: 102 MMHG

## 2020-06-17 PROCEDURE — 99213 OFFICE O/P EST LOW 20 MIN: CPT | Performed by: INTERNAL MEDICINE

## 2020-06-17 PROCEDURE — 99213 OFFICE O/P EST LOW 20 MIN: CPT

## 2020-06-17 RX ORDER — LIDOCAINE HYDROCHLORIDE 40 MG/ML
SOLUTION TOPICAL ONCE
Status: DISCONTINUED | OUTPATIENT
Start: 2020-06-17 | End: 2020-06-18 | Stop reason: HOSPADM

## 2020-06-17 ASSESSMENT — PAIN SCALES - GENERAL: PAINLEVEL_OUTOF10: 0

## 2020-06-17 NOTE — PLAN OF CARE
Left knee healed, no dressing needed this week. Left leg & foot edema slight increased with use of Farrow light compression garment, but stable. Left great toe remains stable & scab intact. Right knee with one fragile area but improving, will cont. With current wound care regime. Scrotum fragily healed & overall doing well, will stop Calmoseptine. Pt. Cont. To off-load well, although has started to get up in chair 1-2 times this last week for short periods of time. Back stable, no debridement, cont. With current wound care regime. F/u in [address] [place] [address] in 1 week as ordered. Discharge instructions reviewed with patient, all questions answered, copy given to patient. Dressings were applied to all wounds per M.D. Instructions at this visit.

## 2020-06-21 NOTE — PROGRESS NOTES
m)       Constitutional:  well-developed, well-nourished, NAD  Psychiatric:  oriented to person, place and time; mood and affect appropriate for the situation   Eyes:  pupils equal, round and reactive to light; sclerae anicteric, conjunctivae not pale  Cardiovascular:  Left pedal pulses Dopplerable, foot a bit cool, not unusual for him; mild-moderate BL stage 2 lower extremity lymphedema  Lymphatic:  no inguinal or popliteal adenopathy, no cellulitis, angitis, fluctuance  Musculoskeletal:  no clubbing, cyanosis or petechiae; RLE and LLE with no gross effusions, joint misalignment or acute arthritis  Chetna-ulcer skin: indurated, pink to light red and slightly moist at the T-spine; pink to [place] Rudy and still very fragile at the right knee; pink to light red, indurated and mild friction changes at the scrotum and perineum. Ulcer(s): T-spine with stable three screws exposed, a bit of hypergranulation, moderate drainage, one small area of purulence expressed by manual pressure on a bit of focal fluctuance near the superior screw; right knee cluster with just one or two tiny areas of superficial erosion; left knee healed, left great toe with some hyperkeratosis but healed; perineum and scrotum without a sizeable focal ulcer. Photos also saved in electronic chart.     Today's Wound Measurements, per RN documentation:  [REMOVED] Wound 03/18/20 #60, Scrotum - Posterior, Pressure Injury, Stage 2, Onset 3/13/20-Wound Length (cm): 0 cm(healed per MD)  Wound 08/16/17 #27, Thoracic spine CLUSTER, dehisced surgical wound, full thickness, onset 8/16/2017-Wound Length (cm): 4.6 cm    [REMOVED] Wound 03/18/20 #60, Scrotum - Posterior, Pressure Injury, Stage 2, Onset 3/13/20-Wound Width (cm): 0 cm  Wound 08/16/17 #27, Thoracic spine CLUSTER, dehisced surgical wound, full thickness, onset 8/16/2017-Wound Width (cm): 4.5 cm    [REMOVED] Wound 03/18/20 #60, Scrotum - Posterior, Pressure Injury, Stage 2, Onset 3/13/20-Wound Depth (cm): 0 lymphedema, diabetes, chronic pressure, decreased mobility and shear force. Medical necessity of today's visit is shown by the above documentation. Sharp debridement is not indicated today, based upon the exam findings in the wound(s) above. Discharge plan:     Treatment in the wound care center today, per RN documentation: Wound 08/16/17 #27, Thoracic spine CLUSTER, dehisced surgical wound, full thickness, onset 8/16/2017-Dressing/Treatment: (vashe calmoseptine mepilex border). Again for the right knee, applied Betadine, a small piece of Xeroform, gauze, foam, Spandagrip. I reminded the patient of the importance of weight management and smoking cessation, if applicable; also encouraged ambulation as tolerated, additional lower extremity exercises as instructed in our education sheet, leg elevation when at rest, and compliance with any recommended dietary, diuretic and compression therapies. Continue compression on the left side with the foot & ankle garment and the leg wrap. Offloading with the group II mattress and frequent position changes in bed; ROHO Heal Pad boot for the left foot; ok to continue brief periods of time up in his chair, if someone is careful to check his skin afterwards (I'm thinking  minutes, perhaps not every day, but we can slowly ramp up as tolerated). Keep up the good work with protein intake and glucose control. Home treatment: good handwashing before and after any dressing changes. Cleanse ulcer with saline or soap & water before dressing change. May use Vaseline (petrolatum), Aquaphor, Aveeno, CeraVe, Cetaphil, Eucerin, Lubriderm, etc for dry skin. Dressing type for home: as above for the T-spine daily; a bit of Aquaphor to the left great toe PRN; Calmoseptine to areas of friction and denudation at the perineum and scrotum, PRN. Written discharge instructions given to patient. Follow up in 1 week.     Electronically signed by Sanjuanita Luo MD on 6/21/2020 at 12:06 PM.

## 2020-06-24 ENCOUNTER — HOSPITAL ENCOUNTER (OUTPATIENT)
Dept: WOUND CARE | Age: 55
Discharge: HOME | End: 2020-06-24
Payer: MEDICARE

## 2020-06-24 VITALS
HEART RATE: 65 BPM | TEMPERATURE: 98 F | SYSTOLIC BLOOD PRESSURE: 131 MMHG | RESPIRATION RATE: 20 BRPM | DIASTOLIC BLOOD PRESSURE: 70 MMHG

## 2020-06-24 PROCEDURE — 97597 DBRDMT OPN WND 1ST 20 CM/<: CPT

## 2020-06-24 PROCEDURE — 97597 DBRDMT OPN WND 1ST 20 CM/<: CPT | Performed by: INTERNAL MEDICINE

## 2020-06-24 RX ORDER — LIDOCAINE 40 MG/G
CREAM TOPICAL ONCE
Status: DISCONTINUED | OUTPATIENT
Start: 2020-06-24 | End: 2020-06-25 | Stop reason: HOSPADM

## 2020-06-24 ASSESSMENT — PAIN SCALES - GENERAL: PAINLEVEL_OUTOF10: 0

## 2020-06-25 PROBLEM — L97.521 DIABETIC ULCER OF TOE OF LEFT FOOT ASSOCIATED WITH TYPE 2 DIABETES MELLITUS, LIMITED TO BREAKDOWN OF SKIN (HCC): Status: ACTIVE | Noted: 2020-01-20
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
## 2020-06-30 ENCOUNTER — HOSPITAL ENCOUNTER (OUTPATIENT)
Dept: WOUND CARE | Age: 55
Discharge: HOME | End: 2020-06-30
Payer: MEDICARE

## 2020-06-30 VITALS
HEART RATE: 67 BPM | SYSTOLIC BLOOD PRESSURE: 121 MMHG | RESPIRATION RATE: 18 BRPM | HEIGHT: 74 IN | BODY MASS INDEX: 32.74 KG/M2 | TEMPERATURE: 98 F | DIASTOLIC BLOOD PRESSURE: 72 MMHG

## 2020-06-30 PROCEDURE — 17250 CHEM CAUT OF GRANLTJ TISSUE: CPT

## 2020-06-30 PROCEDURE — 17250 CHEM CAUT OF GRANLTJ TISSUE: CPT | Performed by: INTERNAL MEDICINE

## 2020-06-30 RX ORDER — LIDOCAINE 40 MG/G
CREAM TOPICAL ONCE
Status: DISCONTINUED | OUTPATIENT
Start: 2020-06-30 | End: 2020-07-01 | Stop reason: HOSPADM

## 2020-06-30 ASSESSMENT — PAIN SCALES - GENERAL: PAINLEVEL_OUTOF10: 0

## 2020-06-30 NOTE — PROGRESS NOTES
Calmoseptine applied to buttocks. Betadine,xeroform, 4x4, foam, J derma fit applied to right knee. PSO, benzoin, mepilex border applied to left knee. Farrow wrap applied to left lower leg.

## 2020-07-04 NOTE — PROGRESS NOTES
changes at the perineum. Ulcer(s): T-spine stable hardware exposure, more hypergranulation this week, moderate bloody and partly purulent drainage; perineal friction ulcer small; right knee I think delicately healed; left great toe smaller, dark red, granular, no worrisome depth. Photos also saved in electronic chart. Today's Wound Measurements, per RN documentation:  Wound 12/11/19 #56, Left Great Toe, Diabetic, [place] 2, Onset 12/6/19-Wound Length (cm): 0.1 cm  Wound 08/16/17 #27, Thoracic spine CLUSTER, dehisced surgical wound, full thickness, onset 8/16/2017-Wound Length (cm): 4.1 cm    Wound 12/11/19 #56, Left Great Toe, Diabetic, [place] 2, Onset 12/6/19-Wound Width (cm): 0.1 cm  Wound 08/16/17 #27, Thoracic spine CLUSTER, dehisced surgical wound, full thickness, onset 8/16/2017-Wound Width (cm): 4.3 cm    Wound 12/11/19 #56, Left Great Toe, Diabetic, [place] 2, Onset 12/6/19-Wound Depth (cm): 0.1 cm  Wound 08/16/17 #27, Thoracic spine CLUSTER, dehisced surgical wound, full thickness, onset 8/16/2017-Wound Depth (cm): 0.5 cm    Assessment:     Patient Active Problem List   Diagnosis Code    Mixed hyperlipidemia E78.2    Coronary artery disease involving native coronary artery of native heart without angina pectoris I25.10    Paraplegia, complete (HCC) G82.21    Chronic back pain M54.9, G89.29    Arthritis M19.90    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    LIBORIO on CPAP G47.33, Z99.89    Diabetic ulcer of right knee associated with type 2 diabetes mellitus, limited to breakdown of skin ([address] E11.622, L97.551    Diabetic ulcer of toe of left foot associated with type 2 diabetes mellitus, limited to breakdown of skin ([facility] [address] E11.621, L97.521    PAD (peripheral artery disease) ([facility]) I73.9       Assessment of today's active condition(s): DM, paraplegia, a few remaining ulcers, with the knee and the toe expected to be well-healed soon, the perineum probably set to be a long-term issue with hopefully minor ulcer recurrences; T-spine care will need to be palliative for the long term. Factors contributing to occurrence and/or persistence of the chronic ulcer include lymphedema, diabetes, chronic pressure, decreased mobility and shear force. Medical necessity of today's visit is shown by the above documentation. Sharp debridement is not indicated today, based upon the exam findings in the wound(s) above. Procedure note:     Consent obtained. Time out performed per [facility]. Anesthetic  Anesthetic: 4% Lidocaine Cream     To encourage better epithelial cell coverage, I did use AgNO3 to chemically cauterize hypergranulation tissue on the back (midline) ulcer(s). This was tolerated well, with no significant pain or skin injury. Discharge plan:     Treatment in the wound care center today, per RN documentation: Wound 12/11/19 #56, Left Great Toe, Diabetic, [place] 2, Onset 12/6/19-Dressing/Treatment: (pso, dry dressing )  Wound 08/16/17 #27, Thoracic spine CLUSTER, dehisced surgical wound, full thickness, onset 8/16/2017-Dressing/Treatment: (vashe,kiran,border). Calmoseptine to any open areas and immediate periwound at the perineum. Reapply daily PRN. For the right knee, Betadine, one small piece of Xeroform, gauze, foam, Spandagrip. Continue good work with offloading, glucose control, protein intake. Can gradually increase chair time as long as skin is unaffected. Would continue ROHO HealPad boot for now.     I reminded the patient of the importance of weight management and smoking cessation, if applicable; also encouraged ambulation as tolerated, additional lower extremity exercises as instructed in our education sheet, leg elevation when at rest, and compliance with any recommended dietary, diuretic and compression therapies. Farrow wrap to the left leg, try to balance good compression with not overdoing it and causing increased signs of pedal ischemia. Home treatment: good handwashing before and after any dressing changes. Cleanse wound with saline or soap & water before dressing change. May use Vaseline (petrolatum), Aquaphor, Aveeno, CeraVe, Cetaphil, Eucerin, Lubriderm, etc for dry skin. Dressing type for home: as above, every other day. Written discharge instructions given to patient. Follow up in 1 week.     Electronically signed by Akash Haynes MD on 7/4/2020 at 9:22 AM.

## 2020-07-08 ENCOUNTER — HOSPITAL ENCOUNTER (OUTPATIENT)
Dept: WOUND CARE | Age: 55
Discharge: HOME | End: 2020-07-08
Payer: MEDICARE

## 2020-07-08 VITALS
RESPIRATION RATE: 20 BRPM | SYSTOLIC BLOOD PRESSURE: 116 MMHG | HEART RATE: 64 BPM | DIASTOLIC BLOOD PRESSURE: 74 MMHG | TEMPERATURE: 98.1 F

## 2020-07-08 PROCEDURE — 99213 OFFICE O/P EST LOW 20 MIN: CPT | Performed by: INTERNAL MEDICINE

## 2020-07-08 PROCEDURE — 99213 OFFICE O/P EST LOW 20 MIN: CPT

## 2020-07-08 RX ORDER — LIDOCAINE 40 MG/G
CREAM TOPICAL ONCE
Status: DISCONTINUED | OUTPATIENT
Start: 2020-07-08 | End: 2020-07-09 | Stop reason: HOSPADM

## 2020-07-08 ASSESSMENT — PAIN SCALES - GENERAL: PAINLEVEL_OUTOF10: 0

## 2020-07-09 PROBLEM — E11.622 DIABETIC ULCER OF RIGHT LOWER LEG ASSOCIATED WITH TYPE 2 DIABETES MELLITUS, LIMITED TO BREAKDOWN OF SKIN (HCC): Status: RESOLVED | Noted: 2019-05-01 | Resolved: 2020-07-09

## 2020-07-09 PROBLEM — E11.621 DIABETIC ULCER OF TOE OF LEFT FOOT ASSOCIATED WITH TYPE 2 DIABETES MELLITUS, LIMITED TO BREAKDOWN OF SKIN (HCC): Status: RESOLVED | Noted: 2020-01-20 | Resolved: 2020-07-09

## 2020-07-09 PROBLEM — Z79.4 TYPE 2 DIABETES MELLITUS WITH DIABETIC PERIPHERAL ANGIOPATHY WITHOUT GANGRENE, WITH LONG-TERM CURRENT USE OF INSULIN (HCC): Status: ACTIVE | Noted: 2020-03-25

## 2020-07-09 PROBLEM — L97.521 DIABETIC ULCER OF TOE OF LEFT FOOT ASSOCIATED WITH TYPE 2 DIABETES MELLITUS, LIMITED TO BREAKDOWN OF SKIN (HCC): Status: RESOLVED | Noted: 2020-01-20 | Resolved: 2020-07-09

## 2020-07-09 PROBLEM — E11.51 TYPE 2 DIABETES MELLITUS WITH DIABETIC PERIPHERAL ANGIOPATHY WITHOUT GANGRENE, WITH LONG-TERM CURRENT USE OF INSULIN (HCC): Status: ACTIVE | Noted: 2020-03-25

## 2020-07-09 PROBLEM — L97.911 DIABETIC ULCER OF RIGHT LOWER LEG ASSOCIATED WITH TYPE 2 DIABETES MELLITUS, LIMITED TO BREAKDOWN OF SKIN (HCC): Status: RESOLVED | Noted: 2019-05-01 | Resolved: 2020-07-09
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
## 2020-07-20 ENCOUNTER — HOSPITAL ENCOUNTER (OUTPATIENT)
Age: 55
Discharge: HOME | End: 2020-07-20
Payer: MEDICARE

## 2020-07-20 ENCOUNTER — OFFICE VISIT (OUTPATIENT)
Dept: INTERNAL MEDICINE CLINIC | Age: 55
End: 2020-07-20

## 2020-07-20 VITALS — RESPIRATION RATE: 18 BRPM | DIASTOLIC BLOOD PRESSURE: 75 MMHG | HEART RATE: 62 BPM | SYSTOLIC BLOOD PRESSURE: 125 MMHG

## 2020-07-20 DIAGNOSIS — E11.51 TYPE 2 DIABETES MELLITUS WITH DIABETIC PERIPHERAL ANGIOPATHY WITHOUT GANGRENE, WITH LONG-TERM CURRENT USE OF INSULIN (HCC): ICD-10-CM

## 2020-07-20 DIAGNOSIS — Z79.4 TYPE 2 DIABETES MELLITUS WITH DIABETIC PERIPHERAL ANGIOPATHY WITHOUT GANGRENE, WITH LONG-TERM CURRENT USE OF INSULIN (HCC): ICD-10-CM

## 2020-07-20 DIAGNOSIS — D50.0 IRON DEFICIENCY ANEMIA DUE TO CHRONIC BLOOD LOSS: ICD-10-CM

## 2020-07-20 DIAGNOSIS — I48.0 PAROXYSMAL ATRIAL FIBRILLATION (HCC): ICD-10-CM

## 2020-07-20 LAB
A/G RATIO: 1 (ref 1.1–2.2)
ALBUMIN SERPL-MCNC: 3.9 G/DL (ref 3.4–5)
ALP BLD-CCNC: 138 U/L (ref 40–129)
ALT SERPL-CCNC: <5 U/L (ref 10–40)
ANION GAP SERPL CALCULATED.3IONS-SCNC: 16 MMOL/L (ref 3–16)
AST SERPL-CCNC: <5 U/L (ref 15–37)
BASOPHILS ABSOLUTE: 0.1 K/UL (ref 0–0.2)
BASOPHILS RELATIVE PERCENT: 1 %
BILIRUB SERPL-MCNC: <0.2 MG/DL (ref 0–1)
BUN BLDV-MCNC: 86 MG/DL (ref 7–20)
CALCIUM SERPL-MCNC: 9.6 MG/DL (ref 8.3–10.6)
CHLORIDE BLD-SCNC: 90 MMOL/L (ref 99–110)
CO2: 23 MMOL/L (ref 21–32)
CREAT SERPL-MCNC: 1.5 MG/DL (ref 0.9–1.3)
EOSINOPHILS ABSOLUTE: 0.2 K/UL (ref 0–0.6)
EOSINOPHILS RELATIVE PERCENT: 2.4 %
GFR AFRICAN AMERICAN: 59
GFR NON-AFRICAN AMERICAN: 49
GLOBULIN: 4 G/DL
GLUCOSE BLD-MCNC: 217 MG/DL (ref 70–99)
HCT VFR BLD CALC: 36.4 % (ref 40.5–52.5)
HEMOGLOBIN: 11.7 G/DL (ref 13.5–17.5)
LYMPHOCYTES ABSOLUTE: 1.3 K/UL (ref 1–5.1)
LYMPHOCYTES RELATIVE PERCENT: 16.7 %
MCH RBC QN AUTO: 27 PG (ref 26–34)
MCHC RBC AUTO-ENTMCNC: 32.1 G/DL (ref 31–36)
MCV RBC AUTO: 84.1 FL (ref 80–100)
MONOCYTES ABSOLUTE: 0.6 K/UL (ref 0–1.3)
MONOCYTES RELATIVE PERCENT: 7.5 %
NEUTROPHILS ABSOLUTE: 5.6 K/UL (ref 1.7–7.7)
NEUTROPHILS RELATIVE PERCENT: 72.4 %
PDW BLD-RTO: 17 % (ref 12.4–15.4)
PLATELET # BLD: 231 K/UL (ref 135–450)
PMV BLD AUTO: 10.2 FL (ref 5–10.5)
POTASSIUM SERPL-SCNC: 5.6 MMOL/L (ref 3.5–5.1)
RBC # BLD: 4.33 M/UL (ref 4.2–5.9)
SARS-COV-2, NAAT: NOT DETECTED
SODIUM BLD-SCNC: 129 MMOL/L (ref 136–145)
TOTAL PROTEIN: 7.9 G/DL (ref 6.4–8.2)
TSH REFLEX: 4.02 UIU/ML (ref 0.27–4.2)
WBC # BLD: 7.8 K/UL (ref 4–11)

## 2020-07-20 PROCEDURE — 3046F HEMOGLOBIN A1C LEVEL >9.0%: CPT | Performed by: INTERNAL MEDICINE

## 2020-07-20 PROCEDURE — G8427 DOCREV CUR MEDS BY ELIG CLIN: HCPCS | Performed by: INTERNAL MEDICINE

## 2020-07-20 PROCEDURE — 2022F DILAT RTA XM EVC RTNOPTHY: CPT | Performed by: INTERNAL MEDICINE

## 2020-07-20 PROCEDURE — 3017F COLORECTAL CA SCREEN DOC REV: CPT | Performed by: INTERNAL MEDICINE

## 2020-07-20 PROCEDURE — 99214 OFFICE O/P EST MOD 30 MIN: CPT | Performed by: INTERNAL MEDICINE

## 2020-07-20 PROCEDURE — G8417 CALC BMI ABV UP PARAM F/U: HCPCS | Performed by: INTERNAL MEDICINE

## 2020-07-20 PROCEDURE — 1036F TOBACCO NON-USER: CPT | Performed by: INTERNAL MEDICINE

## 2020-07-20 PROCEDURE — U0002 COVID-19 LAB TEST NON-CDC: HCPCS
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
## 2020-07-21 ENCOUNTER — TELEPHONE (OUTPATIENT)
Dept: INTERNAL MEDICINE CLINIC | Age: 55
End: 2020-07-21

## 2020-07-21 LAB
ESTIMATED AVERAGE GLUCOSE: 185.8 MG/DL
HBA1C MFR BLD: 8.1 %

## 2020-07-21 NOTE — PROGRESS NOTES
1.  Do not eat or drink anything after 12 midnight prior to surgery. This includes no water, chewing gum or mints. 2.  Take the following pills with a small sip of water on the morning of surgery . 3. Aspirin, Ibuprofen, Advil, Naproxen, Vitamin E and other Anti-inflammatory products should be stopped for 5 days before surgery or as directed by your physician. 4.  Check with your doctor regarding stopping Plavix, Coumadin, Lovenox, Fragmin or other blood thinners. 5.  Do not smoke and do not drink alcoholic beverages 24 hours prior to surgery. This includes NA Beer. 6.  You may brush your teeth and gargle the morning of surgery. DO NOT SWALLOW WATER.  7.  You MUST make arrangements for a responsible adult to take you home after your surgery. You will not be allowed to leave alone or drive yourself home. It is strongly suggested someone stay with you the first 24 hours. Your surgery will be cancelled if you do not have a ride home. 8.  A parent/legal guardian must accompany a child scheduled for surgery and plan to stay at the hospital until the child is discharged. Please do not bring other children with you. 9.  Please wear simple, loose fitting clothing to the hospital.  Lili Herring not bring valuables ( money, credit cards, checkbooks, etc.)  Do not wear any makeup (including no eye makeup) or nail polish on your fingers or toes. 10.  Do not wear any jewelry or piercing on the day of surgery. All body piercing jewelry must be removed. 11.  If you have dentures, they will be removed before going to the OR; we will provide you a container. If you wear contact lenses or glasses, they will be removed; please bring a case for them. 12.  Please see your family doctor/pediatrician for a history & physical and/or concerning medications. Bring any test results/reports from your physician's office the day of surgery. 15.  Remember to bring Blood Bank Bracelet to the hospital on the day of surgery.   14.  If you have a Living Will and Durable Power of  for Healthcare, please bring in a copy. 13.  Notify your Surgeon if you develop any illness between now and surgery time; cough, cold, fever, sore throat, nausea, vomiting, etc.  Please notify your surgeon if you experience dizziness, shortness of breath or blurred vision between now and the time of your surgery. 16.  DO NOT shave your operative site 96 hours (4 days) prior to surgery. For face and neck surgery, men may use an electric razor 48 hours (2 days) prior to surgery. 17. Shower the night before surgery and the morning of surgery with  an antibacterial soap   or  Chlorhexidine gluconate (for total joint replacement). To provide excellent care, visitors will be limited to two in a room at any given time. Please no children under the age of 15 in the surgical department.

## 2020-07-21 NOTE — TELEPHONE ENCOUNTER
Per Dr Dye Stands patient informed and voiced understanding to hold Demadex and Lisinopril until he has lab work done next Wednesday.

## 2020-07-22 ENCOUNTER — ANESTHESIA EVENT (OUTPATIENT)
Dept: OPERATING ROOM | Age: 55
End: 2020-07-22
Payer: MEDICARE

## 2020-07-22 ENCOUNTER — HOSPITAL ENCOUNTER (OUTPATIENT)
Dept: WOUND CARE | Age: 55
Discharge: HOME | End: 2020-07-22
Payer: MEDICARE

## 2020-07-22 VITALS
BODY MASS INDEX: 30.81 KG/M2 | TEMPERATURE: 98.2 F | DIASTOLIC BLOOD PRESSURE: 76 MMHG | RESPIRATION RATE: 18 BRPM | HEART RATE: 67 BPM | HEIGHT: 74 IN | SYSTOLIC BLOOD PRESSURE: 122 MMHG

## 2020-07-22 PROCEDURE — 99213 OFFICE O/P EST LOW 20 MIN: CPT

## 2020-07-22 PROCEDURE — 99212 OFFICE O/P EST SF 10 MIN: CPT | Performed by: INTERNAL MEDICINE

## 2020-07-22 RX ORDER — LIDOCAINE 40 MG/G
CREAM TOPICAL ONCE
Status: DISCONTINUED | OUTPATIENT
Start: 2020-07-22 | End: 2020-07-23 | Stop reason: HOSPADM

## 2020-07-22 ASSESSMENT — PAIN SCALES - GENERAL
PAINLEVEL_OUTOF10: 0
PAINLEVEL_OUTOF10: 0

## 2020-07-22 NOTE — PLAN OF CARE
Right knee remains healed, cont. With betadine today in [facility] & apply foam over area to protect & Dermafit to help hold foam in place. Left great toe, heel & leg remain healed & edema well controlled with compression garment. Left heel ROHO off-loading heel boot appears to be flat. Pt. Encouraged to check the [facility] boot weekly for proper inflation, pt. Verbalizes understanding & has a pump at home to reinflate. Back wound stable, no debridement. Some shearing noted to perineal area & stable. Will cont. With current wound care regime. Pt. Is cont. With excellent off-loading. Pt. Requesting to f/u in 2 weeks in [facility]. Discharge instructions reviewed with patient, all questions answered, copy given to patient. Dressings were applied to all wounds per M.D. Instructions at this visit.
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
## 2020-07-22 NOTE — PROGRESS NOTES
Betadine applied to affected area on right knee, Calmoseptine applied to buttocks per MD order. Patient tolerates well.

## 2020-07-24 ENCOUNTER — HOSPITAL ENCOUNTER (OUTPATIENT)
Age: 55
Setting detail: OUTPATIENT SURGERY
Discharge: HOME | End: 2020-07-24
Attending: OPHTHALMOLOGY | Admitting: OPHTHALMOLOGY
Payer: MEDICARE

## 2020-07-24 ENCOUNTER — ANESTHESIA (OUTPATIENT)
Dept: OPERATING ROOM | Age: 55
End: 2020-07-24
Payer: MEDICARE

## 2020-07-24 VITALS — SYSTOLIC BLOOD PRESSURE: 116 MMHG | DIASTOLIC BLOOD PRESSURE: 73 MMHG | OXYGEN SATURATION: 100 %

## 2020-07-24 VITALS
BODY MASS INDEX: 30.8 KG/M2 | HEIGHT: 74 IN | HEART RATE: 69 BPM | OXYGEN SATURATION: 95 % | DIASTOLIC BLOOD PRESSURE: 69 MMHG | TEMPERATURE: 96.9 F | SYSTOLIC BLOOD PRESSURE: 127 MMHG | WEIGHT: 240 LBS | RESPIRATION RATE: 17 BRPM

## 2020-07-24 LAB
ANION GAP SERPL CALCULATED.3IONS-SCNC: 15 MMOL/L (ref 3–16)
BUN BLDV-MCNC: 64 MG/DL (ref 7–20)
CALCIUM SERPL-MCNC: 9.2 MG/DL (ref 8.3–10.6)
CHLORIDE BLD-SCNC: 94 MMOL/L (ref 99–110)
CO2: 20 MMOL/L (ref 21–32)
CREAT SERPL-MCNC: 1 MG/DL (ref 0.9–1.3)
GFR AFRICAN AMERICAN: >60
GFR NON-AFRICAN AMERICAN: >60
GLUCOSE BLD-MCNC: 230 MG/DL (ref 70–99)
GLUCOSE BLD-MCNC: 239 MG/DL (ref 70–99)
PERFORMED ON: ABNORMAL
POTASSIUM SERPL-SCNC: 5.3 MMOL/L (ref 3.5–5.1)
SODIUM BLD-SCNC: 129 MMOL/L (ref 136–145)

## 2020-07-24 PROCEDURE — 3700000001 HC ADD 15 MINUTES (ANESTHESIA): Performed by: OPHTHALMOLOGY

## 2020-07-24 PROCEDURE — 2580000003 HC RX 258

## 2020-07-24 PROCEDURE — 6370000000 HC RX 637 (ALT 250 FOR IP): Performed by: OPHTHALMOLOGY

## 2020-07-24 PROCEDURE — 7100000000 HC PACU RECOVERY - FIRST 15 MIN: Performed by: OPHTHALMOLOGY

## 2020-07-24 PROCEDURE — 80048 BASIC METABOLIC PNL TOTAL CA: CPT

## 2020-07-24 PROCEDURE — 6360000002 HC RX W HCPCS: Performed by: OPHTHALMOLOGY

## 2020-07-24 PROCEDURE — 7100000010 HC PHASE II RECOVERY - FIRST 15 MIN: Performed by: OPHTHALMOLOGY

## 2020-07-24 PROCEDURE — 7100000001 HC PACU RECOVERY - ADDTL 15 MIN: Performed by: OPHTHALMOLOGY

## 2020-07-24 PROCEDURE — 2500000003 HC RX 250 WO HCPCS: Performed by: NURSE ANESTHETIST, CERTIFIED REGISTERED

## 2020-07-24 PROCEDURE — 2500000003 HC RX 250 WO HCPCS: Performed by: OPHTHALMOLOGY

## 2020-07-24 PROCEDURE — 36415 COLL VENOUS BLD VENIPUNCTURE: CPT

## 2020-07-24 PROCEDURE — 2709999900 HC NON-CHARGEABLE SUPPLY: Performed by: OPHTHALMOLOGY

## 2020-07-24 PROCEDURE — 7100000011 HC PHASE II RECOVERY - ADDTL 15 MIN: Performed by: OPHTHALMOLOGY

## 2020-07-24 PROCEDURE — 3700000000 HC ANESTHESIA ATTENDED CARE: Performed by: OPHTHALMOLOGY

## 2020-07-24 PROCEDURE — V2632 POST CHMBR INTRAOCULAR LENS: HCPCS | Performed by: OPHTHALMOLOGY

## 2020-07-24 PROCEDURE — 3600000003 HC SURGERY LEVEL 3 BASE: Performed by: OPHTHALMOLOGY

## 2020-07-24 PROCEDURE — 3600000013 HC SURGERY LEVEL 3 ADDTL 15MIN: Performed by: OPHTHALMOLOGY

## 2020-07-24 PROCEDURE — 6360000002 HC RX W HCPCS: Performed by: NURSE ANESTHETIST, CERTIFIED REGISTERED

## 2020-07-24 PROCEDURE — 2580000003 HC RX 258: Performed by: NURSE ANESTHETIST, CERTIFIED REGISTERED

## 2020-07-24 DEVICE — ACRYSOF(R) IQ ASPHERIC IOL SP ACRYLIC FOLDABLELENS WULTRASERT(TM) DELIVERY SYSTEM UV WBLUE LIGHT FILTER. 13.0MM LENGTH 6.0MM ANTERIORASYMMETRIC BICONVEX OPTIC PLANAR HAPTICS.
Type: IMPLANTABLE DEVICE | Site: EYE | Status: FUNCTIONAL
Brand: ACRYSOF ULTRASERT

## 2020-07-24 RX ORDER — PILOCARPINE HYDROCHLORIDE 20 MG/ML
SOLUTION/ DROPS OPHTHALMIC PRN
Status: DISCONTINUED | OUTPATIENT
Start: 2020-07-24 | End: 2020-07-24 | Stop reason: ALTCHOICE

## 2020-07-24 RX ORDER — PREDNISOLONE ACETATE 10 MG/ML
1 SUSPENSION/ DROPS OPHTHALMIC SEE ADMIN INSTRUCTIONS
Status: DISCONTINUED | OUTPATIENT
Start: 2020-07-24 | End: 2020-07-24 | Stop reason: HOSPADM

## 2020-07-24 RX ORDER — EPHEDRINE SULFATE 50 MG/ML
INJECTION, SOLUTION INTRAVENOUS PRN
Status: DISCONTINUED | OUTPATIENT
Start: 2020-07-24 | End: 2020-07-24 | Stop reason: SDUPTHER

## 2020-07-24 RX ORDER — FENTANYL CITRATE 50 UG/ML
INJECTION, SOLUTION INTRAMUSCULAR; INTRAVENOUS PRN
Status: DISCONTINUED | OUTPATIENT
Start: 2020-07-24 | End: 2020-07-24 | Stop reason: SDUPTHER

## 2020-07-24 RX ORDER — SODIUM CHLORIDE 9 MG/ML
INJECTION, SOLUTION INTRAVENOUS CONTINUOUS PRN
Status: DISCONTINUED | OUTPATIENT
Start: 2020-07-24 | End: 2020-07-24 | Stop reason: SDUPTHER

## 2020-07-24 RX ORDER — SODIUM CHLORIDE, POTASSIUM CHLORIDE, CALCIUM CHLORIDE, MAGNESIUM CHLORIDE, SODIUM ACETATE, AND SODIUM CITRATE 6.4; .75; .48; .3; 3.9; 1.7 MG/ML; MG/ML; MG/ML; MG/ML; MG/ML; MG/ML
SOLUTION IRRIGATION PRN
Status: DISCONTINUED | OUTPATIENT
Start: 2020-07-24 | End: 2020-07-24 | Stop reason: ALTCHOICE

## 2020-07-24 RX ORDER — ONDANSETRON 2 MG/ML
INJECTION INTRAMUSCULAR; INTRAVENOUS PRN
Status: DISCONTINUED | OUTPATIENT
Start: 2020-07-24 | End: 2020-07-24 | Stop reason: SDUPTHER

## 2020-07-24 RX ORDER — ALIROCUMAB 150 MG/ML
150 INJECTION, SOLUTION SUBCUTANEOUS
COMMUNITY
Start: 2020-07-21

## 2020-07-24 RX ORDER — TOBRAMYCIN AND DEXAMETHASONE 3; 1 MG/ML; MG/ML
1 SUSPENSION/ DROPS OPHTHALMIC SEE ADMIN INSTRUCTIONS
Status: DISCONTINUED | OUTPATIENT
Start: 2020-07-24 | End: 2020-07-24 | Stop reason: HOSPADM

## 2020-07-24 RX ORDER — TETRACAINE HYDROCHLORIDE 5 MG/ML
SOLUTION OPHTHALMIC PRN
Status: DISCONTINUED | OUTPATIENT
Start: 2020-07-24 | End: 2020-07-24 | Stop reason: ALTCHOICE

## 2020-07-24 RX ORDER — LIDOCAINE HYDROCHLORIDE 20 MG/ML
INJECTION, SOLUTION INFILTRATION; PERINEURAL PRN
Status: DISCONTINUED | OUTPATIENT
Start: 2020-07-24 | End: 2020-07-24 | Stop reason: SDUPTHER

## 2020-07-24 RX ORDER — TOBRAMYCIN AND DEXAMETHASONE 3; 1 MG/ML; MG/ML
SUSPENSION/ DROPS OPHTHALMIC PRN
Status: DISCONTINUED | OUTPATIENT
Start: 2020-07-24 | End: 2020-07-24 | Stop reason: ALTCHOICE

## 2020-07-24 RX ORDER — LIDOCAINE HYDROCHLORIDE 10 MG/ML
2 INJECTION, SOLUTION EPIDURAL; INFILTRATION; INTRACAUDAL; PERINEURAL
Status: DISCONTINUED | OUTPATIENT
Start: 2020-07-24 | End: 2020-07-24 | Stop reason: HOSPADM

## 2020-07-24 RX ORDER — SODIUM CHLORIDE, SODIUM LACTATE, POTASSIUM CHLORIDE, CALCIUM CHLORIDE 600; 310; 30; 20 MG/100ML; MG/100ML; MG/100ML; MG/100ML
INJECTION, SOLUTION INTRAVENOUS CONTINUOUS
Status: DISCONTINUED | OUTPATIENT
Start: 2020-07-24 | End: 2020-07-24 | Stop reason: HOSPADM

## 2020-07-24 RX ORDER — PROPOFOL 10 MG/ML
INJECTION, EMULSION INTRAVENOUS PRN
Status: DISCONTINUED | OUTPATIENT
Start: 2020-07-24 | End: 2020-07-24 | Stop reason: SDUPTHER

## 2020-07-24 RX ORDER — PREDNISOLONE ACETATE 10 MG/ML
SUSPENSION/ DROPS OPHTHALMIC PRN
Status: DISCONTINUED | OUTPATIENT
Start: 2020-07-24 | End: 2020-07-24 | Stop reason: ALTCHOICE

## 2020-07-24 RX ORDER — SODIUM CHLORIDE, SODIUM LACTATE, POTASSIUM CHLORIDE, CALCIUM CHLORIDE 600; 310; 30; 20 MG/100ML; MG/100ML; MG/100ML; MG/100ML
INJECTION, SOLUTION INTRAVENOUS
Status: COMPLETED
Start: 2020-07-24 | End: 2020-07-24

## 2020-07-24 RX ADMIN — Medication 0.3 ML: at 08:27

## 2020-07-24 RX ADMIN — Medication 0.3 ML: at 08:44

## 2020-07-24 RX ADMIN — PHENYLEPHRINE HYDROCHLORIDE 200 MCG: 10 INJECTION INTRAVENOUS at 10:12

## 2020-07-24 RX ADMIN — FENTANYL CITRATE 50 MCG: 50 INJECTION INTRAMUSCULAR; INTRAVENOUS at 09:56

## 2020-07-24 RX ADMIN — EPHEDRINE SULFATE 10 MG: 50 INJECTION INTRAMUSCULAR; INTRAVENOUS; SUBCUTANEOUS at 10:12

## 2020-07-24 RX ADMIN — BROMFENAC SODIUM 1 DROP: 0.7 SOLUTION/ DROPS OPHTHALMIC at 08:26

## 2020-07-24 RX ADMIN — PROPOFOL 200 MG: 10 INJECTION, EMULSION INTRAVENOUS at 09:56

## 2020-07-24 RX ADMIN — SODIUM CHLORIDE: 9 INJECTION, SOLUTION INTRAVENOUS at 09:51

## 2020-07-24 RX ADMIN — LIDOCAINE HYDROCHLORIDE 40 MG: 20 INJECTION, SOLUTION INFILTRATION; PERINEURAL at 09:56

## 2020-07-24 RX ADMIN — PHENYLEPHRINE HYDROCHLORIDE 100 MCG: 10 INJECTION INTRAVENOUS at 10:07

## 2020-07-24 RX ADMIN — TOBRAMYCIN AND DEXAMETHASONE 1 DROP: 3; 1 SUSPENSION/ DROPS OPHTHALMIC at 11:05

## 2020-07-24 RX ADMIN — EPHEDRINE SULFATE 10 MG: 50 INJECTION INTRAMUSCULAR; INTRAVENOUS; SUBCUTANEOUS at 10:10

## 2020-07-24 RX ADMIN — SODIUM CHLORIDE, SODIUM LACTATE, POTASSIUM CHLORIDE, CALCIUM CHLORIDE: 600; 310; 30; 20 INJECTION, SOLUTION INTRAVENOUS at 08:24

## 2020-07-24 RX ADMIN — Medication 0.3 ML: at 08:55

## 2020-07-24 RX ADMIN — PHENYLEPHRINE HYDROCHLORIDE 100 MCG: 10 INJECTION INTRAVENOUS at 10:10

## 2020-07-24 RX ADMIN — SODIUM CHLORIDE, SODIUM LACTATE, POTASSIUM CHLORIDE, AND CALCIUM CHLORIDE: .6; .31; .03; .02 INJECTION, SOLUTION INTRAVENOUS at 08:24

## 2020-07-24 RX ADMIN — ONDANSETRON 4 MG: 2 INJECTION, SOLUTION INTRAMUSCULAR; INTRAVENOUS at 10:01

## 2020-07-24 ASSESSMENT — PULMONARY FUNCTION TESTS
PIF_VALUE: 2
PIF_VALUE: 2
PIF_VALUE: 18
PIF_VALUE: 2
PIF_VALUE: 2
PIF_VALUE: 3
PIF_VALUE: 2
PIF_VALUE: 2
PIF_VALUE: 0
PIF_VALUE: 6
PIF_VALUE: 0
PIF_VALUE: 0
PIF_VALUE: 3
PIF_VALUE: 2
PIF_VALUE: 3
PIF_VALUE: 2
PIF_VALUE: 3
PIF_VALUE: 2
PIF_VALUE: 3
PIF_VALUE: 2
PIF_VALUE: 6
PIF_VALUE: 2
PIF_VALUE: 2
PIF_VALUE: 1
PIF_VALUE: 2
PIF_VALUE: 3
PIF_VALUE: 0
PIF_VALUE: 3
PIF_VALUE: 6
PIF_VALUE: 6

## 2020-07-24 ASSESSMENT — PAIN - FUNCTIONAL ASSESSMENT: PAIN_FUNCTIONAL_ASSESSMENT: 0-10

## 2020-07-24 NOTE — PROGRESS NOTES
Patient is now a Phase II patient. Patient did self cath and received 75 ml yellow urine.   Patient sipping water

## 2020-07-24 NOTE — PROGRESS NOTES
Pt is alert and oriented. Verbalized understanding of procedure, consent form signed, iv started, pt tolerated well. call light within reach.  Eye drops completed, bmp drawn and sent to lab, pt's son is in waiting room

## 2020-07-24 NOTE — OP NOTE
OPERATIVE NOTE    Patient Name   Date of Birth Age  MRN#  Laquetta Nadir   1965   48 y.o.  [id number]       Surgeon        Surgery Date  Javi Pardo        7/24/2020       Preoperative Diagnosis: Nuclear Sclerotic Cataract right eye    Postoperative Diagnosis: Nuclear Sclerotic Cataract right eye    Operative Procedure: Phacoemulsification/ Posterior Chamber Intraocular Lens Implantation          Anesthesia:   General/LMA with Topical Tetracaine    Details of Procedure: The patient was brought to the operating room on the operative stretcher in the supine position with the head resting in the head rest.  After appropriate anesthesia monitoring devices were applied, IV sedation was carried out per the anesthesia service. The LMA device was introduced and general anesthesia was induced by the anesthesia service. The operative eye was prepped and draped in the usual sterile fashion. Tobradex drops and Tetracaine drops were administered. A wire lid speculum was then inserted into the operative eye. A paracentesis was then performed using a 15 degree supersharp blade to enter the globe at the limbus, and a .12 mm colibri forceps was used to stabilize the globe. Viscoat was then instilled into the anterior chamber. A temporal clear cornea groove was then created using the 15 degree supersharp blade. The cornea was then entered using the Mode 2.4 mm clearcut keratome blade. The capsulotomy was then performed using a cystotome, and the capsulorrhexis was completed using uttrata forceps. The nucleus of the cataract was then hydrodissected and hydrodelineated using sterile BSS on a 27 gauge cannula. The nucleus of the cataract was then removed using the phacoemilsification/aspiration device. This was performed in a bimanual/CHOP technique. The remaining cortex was then removed using the irrigation/aspiration device. The posterior capsule was polished using the I/A device with CAP/VAC settings.   The capsular bag was reformed using [employer] The previously selected Mode Acrysof +18.5 diopter AU00T0 intraocular lens implant was then inserted using the cartridge system. It was spun in place using a Kuglen hook. It was centered and found to be in good, stable position. The remaining viscoelastic was then removed using the I/A device. The corneal incision was then hydrated using sterile BSS on a 30 gauge cannula. It was checked and found to be water-tight. Pilocarpine 2%, followed by Tobradex ophthalmic solutions were then instilled into the operative eye. The wire lid speculum was removed, and a postoperative protective shield was applied. The LMA device was then removed and the patient was easily aroused. The patient was then transferred to the postanesthesia recovery unit in good stable condition. The patient tolerated the procedure well without complications. A postoperative instruction sheet was given, and the patient will follow up with Dr. Marybel Bell office as scheduled      EBL:  None    Specimen:  noneOperative Note      Patient: Ramez Zhao  YOB: 1965  MRN: [id number]    Date of Procedure: 7/24/2020    Pre-Op Diagnosis: CATARACT RIGHT EYE    Post-Op Diagnosis: Same       Procedure(s):  PHACO EMULSIFICATION OF CATARACT WITH INTRAOCULAR LENS IMPLANT EYE    Surgeon(s):  Kai Sifuentes MD    Assistant:   * No surgical staff found *    Anesthesia: General    Estimated Blood Loss (mL): none    Complications: None    Specimens:   * No specimens in log *    Implants:  Implant Name Type Inv.  Item Serial No.  Lot No. LRB No. Used Action   LENS IOL Cassoday Boas - [id number] [facility] [id number] MODE  Right 1 Implanted         Drains:   Colostomy LUQ Transverse (Active)       Findings:     Detailed Description of Procedure:       Electronically signed by Kai Sifuentes MD on 7/24/2020 at 10:20 AM

## 2020-07-24 NOTE — ANESTHESIA POSTPROCEDURE EVALUATION
Department of Anesthesiology  Postprocedure Note    Patient: Earlene Sanchez  MRN: [id number]  [place]: 1965  Date of evaluation: 7/24/2020  Time:  2:00 PM     Procedure Summary     Date:  07/24/20 Room / Location:  [place] [address] / [facility]    Anesthesia Start:  6696 Anesthesia Stop:  1030    Procedure:  PHACO EMULSIFICATION OF CATARACT WITH INTRAOCULAR LENS IMPLANT EYE (Right Eye) Diagnosis:  (CATARACT RIGHT EYE)    Surgeon:  Yanci Dunn MD Responsible Provider:  Jasper Keys MD    Anesthesia Type:  general ASA Status:  3          Anesthesia Type: general    Cleve Phase I: Cleve Score: 8    Cleve Phase II: Cleve Score: 10    Last vitals: Reviewed and per EMR flowsheets.        Anesthesia Post Evaluation    Comments: Postoperative Anesthesia Note    Name:    Earlene Sanchez  MRN:      [id number]    Patient Vitals in the past 12 hrs:  07/24/20 1202, BP:127/69, Pulse:69, Resp:17, SpO2:95 %  07/24/20 1151, BP:128/66, Pulse:73, Resp:23, SpO2:95 %  07/24/20 1145, BP:(!) 156/79, Pulse:72, Resp:17, SpO2:94 %  07/24/20 1126, BP:127/73, Pulse:71, Resp:14, SpO2:97 %  07/24/20 1110, BP:138/74, Pulse:80, Resp:15, SpO2:99 %  07/24/20 1055, BP:137/81, Pulse:79, Resp:17, SpO2:98 %  07/24/20 1040, BP:(!) 147/78, Pulse:80, Resp:16, SpO2:90 %  07/24/20 1036, BP:(!) 150/82, Pulse:83, Resp:20, SpO2:96 %  07/24/20 1032, BP:(!) 150/78, Pulse:89, Resp:16, SpO2:91 %  07/24/20 1027, BP:(!) 143/78, Pulse:87, Resp:23, SpO2:93 %  07/24/20 0837, BP:(!) 119/59, Temp:96.9 °F (36.1 °C), Temp src:Temporal, Pulse:63, Resp:18, SpO2:98 %, Height:6' 2\" (1.88 m), Weight:240 lb (108.9 kg)     LABS:    CBC  Lab Results       Component                Value               Date/Time                  WBC                      7.8                 07/20/2020 10:44 AM        HGB                      11.7 (L)            07/20/2020 10:44 AM        HCT                      36.4 (L)            07/20/2020 10:44 AM        PLT 231                 07/20/2020 10:44 AM   RENAL  Lab Results       Component                Value               Date/Time                  NA                       129 (L)             07/24/2020 09:14 AM        K                        5.3 (H)             07/24/2020 09:14 AM        K                        3.8                 06/13/2019 05:20 PM        CL                       94 (L)              07/24/2020 09:14 AM        CO2                      20 (L)              07/24/2020 09:14 AM        BUN                      64 (H)              07/24/2020 09:14 AM        CREATININE               1.0                 07/24/2020 09:14 AM        GLUCOSE                  239 (H)             07/24/2020 09:14 AM   COAGS  Lab Results       Component                Value               Date/Time                  PROTIME                  17.8 (H)            06/13/2019 05:20 PM        INR                      1.56 (H)            06/13/2019 05:20 PM        APTT                     41.4 (H)            06/13/2019 05:20 PM     Intake & Output:  @15DMMZ@    Nausea & Vomiting:  No    Level of Consciousness:  Awake    Pain Assessment:  Adequate analgesia    Anesthesia Complications:  No apparent anesthetic complications    SUMMARY      Vital signs stable  OK to discharge from Stage I post anesthesia care.   Care transferred from Anesthesiology department on discharge from perioperative area

## 2020-07-24 NOTE — PROGRESS NOTES
Arrived from OR with eye shield to right eye. HOB elevated and report received from Jj Bowman RN.   No discomfort noted

## 2020-07-24 NOTE — PROGRESS NOTES
Took pt by cart to wound care, had wonderful help from staff to help transfer pt from cart to his wheelchair    Pt is alert and oriented. Eye drops have been started for post op care, per Dr Alex Arreola orders. Pt and family member received printed and verbal education for post op care r/t Dr Alex Arreola cataract removal with implant surgery both verbalized understanding and now further questions at this time. Took iv site out, applied pressure and dressed with dry gauze, pt tolerated well. Pt was taken out of department via his wheelchair and family is taking pt home. Pt is stable for discharge.

## 2020-07-28 NOTE — PROGRESS NOTES
[facility] Progress Note    Joaquín Santos     : 1965    DATE OF VISIT:  2020    Subjective:     Joaquín Santos is a 48 y.o. male who has a dehisced surgical ulcer located on the back (midline). Current complaint of pain in this ulcer? no. Main symptom there is drainage - serosanguinous to bloody, stable in amount, moderate, occasional malodor, occasional brief periods of purulence, no spreading skin redness. No F/C/D, doing well with offloading his pelvic tissues, doing well with LLE compression. Left ROHO HealPad boot looks a bit deflated today, and there's a bit of new hyperkeratosis/callus on the heel. Other significant symptoms or pertinent ulcer history: scheduled for cataract surgery (#1) next week. Additional ulcer(s) noted? yes. Small areas of pressure and friction ulceration that open up at the perineum. Right knee more durably healed; ischia not open; LLE ulcers still healed.     Mr. Giuseppe Stiles has a past medical history of Acute blood loss anemia, Acute MI ([address] Acute on chronic systolic CHF (congestive heart failure) ([address] Acute osteomyelitis of left foot ([address] Bloodstream infection due to Port-A-Cath, CAD (coronary artery disease), Candidal dermatitis, Cellulitis and abscess of left leg, except foot, Cellulitis of right buttock, Cellulitis of right knee, Chronic osteomyelitis of left foot ([address] Chronic osteomyelitis of left ischium ([address] Chronic osteomyelitis of right foot with draining sinus ([address] COPD (chronic obstructive pulmonary disease) ([address] Decubitus ulcer of left ischium, stage 4 ([address] Diabetes mellitus ([address] Diabetic foot ulcer with osteomyelitis ([address] Discitis of lumbosacral region, DVT of lower extremity, bilateral ([address] ESBL (extended spectrum beta-lactamase) producing bacteria infection, Fracture of cervical vertebra ([address] Fracture of multiple ribs, Fracture of thoracic spine ([address] Gastrointestinal hemorrhage, surgery; Cosmetic surgery; Endoscopy, colon, diagnostic; Insertable Cardiac Monitor; pr lenka skn sub grft t/a/l area/<100scm /<1st 25 scm (Right, 9/27/2018); Leg amputation below knee (Right, 01/15/2019); Leg amputation below knee (Right, 1/15/2019); Tunneled venous port placement (Right, 01/17/2019); INSERTION / REMOVAL / REPLACEMENT VENOUS ACCESS CATHETER (Right, 1/17/2019); other surgical history (07/24/2020); and Intracapsular cataract extraction (Right, 7/24/2020). His family history includes Arthritis in his mother; Cancer in his father and mother; Diabetes in his father and mother; Early Death in his father; Heart Disease in his father and maternal grandmother; High Blood Pressure in his maternal uncle and mother; High Cholesterol in his father and mother; Kidney Disease in his maternal uncle; [de-identified] / [place] in his mother. Mr. Janna Regalado reports that he has never smoked. He has never used smokeless tobacco. He reports that he does not drink alcohol or use drugs. His current medication list consists of Alirocumab, Insulin Pen Needle, Insulin Syringe-Needle U-100, apixaban, aspirin, blood glucose test strips, cetirizine, cyclobenzaprine, docusate sodium, ferrous sulfate, insulin glargine, insulin lispro, lisinopril, magnesium oxide, metFORMIN, metoprolol succinate, nitroGLYCERIN, nystatin, pantoprazole, polyethylene glycol, promethazine, rosuvastatin, senna, torsemide, and traZODone. Allergies: Benadryl [diphenhydramine hcl]; Statins [statins]; Cephalexin; Morphine; Penicillins; and Sulfa antibiotics    Pertinent items from the review of systems are discussed in the HPI; the remainder of the ROS was reviewed and is negative.      Objective:     Vitals:    07/22/20 1058   BP: 122/76   Pulse: 67   Resp: 18   Temp: 98.2 °F (36.8 °C)   TempSrc: Oral   Weight: Comment: no weigh bed available   Height: 6' 2\" (1.88 m)       Constitutional:  well-developed, well-nourished, overweight, NAD  Psychiatric:  oriented to person, place and time; mood and affect appropriate for the situation   Eyes:  pupils equal, round and reactive to light; sclerae anicteric, conjunctivae not pale  Cardiovascular:  Left pedal pulses Dopplerable, foot a bit cool, borderline cap refill; generally mild lower extremity edema  Lymphatic:  no inguinal or popliteal adenopathy, no angitis or cellulitis  Musculoskeletal:  no clubbing, cyanosis or petechiae; RLE and LLE with no gross effusions, joint misalignment or acute arthritis  Chetna-ulcer skin: indurated, pink, warm and (at the perineum) some friction-associated epidermal changes. Ulcer(s): T-spine with left and right sided screws visible, usual hypergranulation there; superior screw with a bit more soft tissue and skin coverage this week, but there's a small inferior sinus tract that is opening up now, with a bit of blood only, no purulence; intermittent small areas of stage 2 pressure ulceration and friction-related denudation of the perineal skin. Photos also saved in electronic chart.     Today's Wound Measurements, per RN documentation:  Wound 08/16/17 #27, Thoracic spine CLUSTER, dehisced surgical wound, full thickness, onset 8/16/2017-Wound Length (cm): 8 cm    Wound 08/16/17 #27, Thoracic spine CLUSTER, dehisced surgical wound, full thickness, onset 8/16/2017-Wound Width (cm): 4 cm    Wound 08/16/17 #27, Thoracic spine CLUSTER, dehisced surgical wound, full thickness, onset 8/16/2017-Wound Depth (cm): 1.2 cm  ______________________________    Lab Results   Component Value Date    LABALBU 3.9 07/20/2020     Lab Results   Component Value Date    CREATININE 1.0 07/24/2020     Lab Results   Component Value Date    HGB 11.7 (L) 07/20/2020     Lab Results   Component Value Date    LABA1C 8.1 07/20/2020     Assessment:     Patient Active Problem List   Diagnosis Code    Mixed hyperlipidemia E78.2    Coronary artery disease involving native coronary artery of native heart without angina pectoris I25.10    Paraplegia, complete ([facility]) G82.21    Chronic back pain M54.9, G89.29    Arthritis M19.90    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    LIBORIO on CPAP G47.33, Z99.89    Type 2 diabetes mellitus with diabetic peripheral angiopathy without gangrene, with long-term current use of insulin ([facility]) E11.51, Z79.4       Assessment of today's active condition(s): diabetes, paraplegia, long-term nonhealing wounds all basically healed now, though the left foot and the right knee are still fragile; the T-spine will not close because of chronic hardware exposure and presumed osteo, and we just need to try to prevent soft tissue infection and sepsis from there; I think small areas of denudation and pressure / friction ulceration at the perineum will be a long-term fischer also, but if things can stay as they are now, and not get worse, that's a pretty good result. Will need to continue to focus on lower extremity offloading and compression to prevent ulcer recurrences there. Factors contributing to occurrence and/or persistence of the chronic ulcer include lymphedema, diabetes, poor glucose control, chronic pressure, decreased mobility, shear force, obesity and decreased tissue oxygenation. Medical necessity of today's visit is shown by the above documentation. Sharp debridement is not indicated today, based upon the exam findings in the wound(s) above.      Discharge plan:     Treatment in the wound care center today, per RN documentation: Wound 08/16/17 #27, Thoracic spine CLUSTER, dehisced surgical wound, full thickness, onset 8/16/2017-Dressing/Treatment: (Vashe,Calmoseptine,Melpilex Border). I expect small areas of sinus tract around screws to open and re-open here, for the long term, as his body is \"trying\" to allow that area to continue to drain, and not lead to a closed abscess. Calmoseptine periodically to perineal tissue when there are friction openings. Keep a small padded dressing on the right knee for a couple more weeks, Spandagrip there for mild compression. I reminded the patient of the importance of weight management and smoking cessation, if applicable; also encouraged ambulation as tolerated, additional lower extremity exercises as instructed in our education sheet, leg elevation when at rest, and compliance with any recommended dietary, diuretic and compression therapies. Continue Farrow wrap on that left leg, not compressed too snugly given his PAD. Need to re-inflate the [facility] boot, and check it frequently. Ok to spend short periods of time up in his chair, as long as more significant areas of pressure ulceration don't recur. Home treatment: good handwashing before and after any dressing changes. Cleanse ulcer with saline or soap & water before dressing change. May use Vaseline (petrolatum), Aquaphor, Aveeno, CeraVe, Cetaphil, Eucerin, Lubriderm, etc for dry skin. Dressing type for home: as above, three times weekly. Written discharge instructions given to patient. Follow up in 2 weeks.     Electronically signed by Deangelo Parrish MD on 7/28/2020 at 12:36 PM.

## 2020-07-29 RX ORDER — INSULIN GLARGINE 100 [IU]/ML
INJECTION, SOLUTION SUBCUTANEOUS
Qty: 30 ML | Refills: 2 | Status: SHIPPED | OUTPATIENT
Start: 2020-07-29 | End: 2020-11-23 | Stop reason: SDUPTHER

## 2020-08-04 ENCOUNTER — HOSPITAL ENCOUNTER (OUTPATIENT)
Age: 55
Discharge: HOME | End: 2020-08-04
Payer: MEDICARE

## 2020-08-04 LAB
ANION GAP SERPL CALCULATED.3IONS-SCNC: 18 MMOL/L (ref 3–16)
BUN BLDV-MCNC: 71 MG/DL (ref 7–20)
CALCIUM SERPL-MCNC: 9.5 MG/DL (ref 8.3–10.6)
CHLORIDE BLD-SCNC: 95 MMOL/L (ref 99–110)
CO2: 22 MMOL/L (ref 21–32)
CREAT SERPL-MCNC: 1.3 MG/DL (ref 0.9–1.3)
GFR AFRICAN AMERICAN: >60
GFR NON-AFRICAN AMERICAN: 58
GLUCOSE BLD-MCNC: 312 MG/DL (ref 70–99)
POTASSIUM SERPL-SCNC: 5.1 MMOL/L (ref 3.5–5.1)
SARS-COV-2, NAAT: NOT DETECTED
SODIUM BLD-SCNC: 135 MMOL/L (ref 136–145)

## 2020-08-04 PROCEDURE — 80048 BASIC METABOLIC PNL TOTAL CA: CPT

## 2020-08-04 PROCEDURE — 36415 COLL VENOUS BLD VENIPUNCTURE: CPT

## 2020-08-04 PROCEDURE — U0002 COVID-19 LAB TEST NON-CDC: HCPCS

## 2020-08-05 ENCOUNTER — TELEPHONE (OUTPATIENT)
Dept: INTERNAL MEDICINE CLINIC | Age: 55
End: 2020-08-05

## 2020-08-05 ENCOUNTER — HOSPITAL ENCOUNTER (OUTPATIENT)
Dept: WOUND CARE | Age: 55
Discharge: HOME | End: 2020-08-05
Payer: MEDICARE

## 2020-08-05 VITALS
TEMPERATURE: 98 F | HEART RATE: 66 BPM | RESPIRATION RATE: 18 BRPM | SYSTOLIC BLOOD PRESSURE: 132 MMHG | BODY MASS INDEX: 30.81 KG/M2 | HEIGHT: 74 IN | DIASTOLIC BLOOD PRESSURE: 83 MMHG

## 2020-08-05 PROCEDURE — 11720 DEBRIDE NAIL 1-5: CPT | Performed by: INTERNAL MEDICINE

## 2020-08-05 PROCEDURE — 11720 DEBRIDE NAIL 1-5: CPT

## 2020-08-05 RX ORDER — LIDOCAINE 40 MG/G
CREAM TOPICAL ONCE
Status: DISCONTINUED | OUTPATIENT
Start: 2020-08-05 | End: 2020-08-06 | Stop reason: HOSPADM

## 2020-08-05 ASSESSMENT — PAIN SCALES - GENERAL: PAINLEVEL_OUTOF10: 0

## 2020-08-05 NOTE — TELEPHONE ENCOUNTER
----- Message from Jos Buenrostro MD sent at 8/5/2020  9:58 AM EDT -----  Take two demadex alternating with one then  Hold lisinopril for now  ----- Message -----  From: Marilynjoslyn Radha  Sent: 8/5/2020   9:53 AM EDT  To: Jos Buenrostro MD    Pt states he has not cut down the demadex yet. He started taking demadex at his regular dose the day after his surgery. He has not started back on lisinopril at all. Please advise.

## 2020-08-07 NOTE — PROGRESS NOTES
88 [facility] Progress Note    Aldo Mccullough     : 1965    DATE OF VISIT:  2020    Subjective:     Aldo Mccullough is a 48 y.o. male who has a dehisced surgical ulcer located on the back (midline). Significant symptoms or pertinent wound history since last visit: doing well overall. Stable mild-mod drainage from back, no pus or spreading redness or fever recently. Had one cataract procedure recently, did very well, has his 2nd one in a few weeks, and is having some dental work done this week. ROHO offloading boot still not staying inflated very long (he's adding air daily). Glucoses doing well. Additional ulcer(s) noted? no. LLE healed, right knee healed, just a couple of small, occasional areas of pressure or friction-related erosions near his perineum. His current medication list consists of Alirocumab, Insulin Pen Needle, Insulin Syringe-Needle U-100, apixaban, aspirin, blood glucose test strips, cetirizine, cyclobenzaprine, docusate sodium, ferrous sulfate, insulin glargine, insulin lispro, lisinopril, magnesium oxide, metFORMIN, metoprolol succinate, nitroGLYCERIN, nystatin, pantoprazole, polyethylene glycol, promethazine, rosuvastatin, senna, torsemide, and traZODone. Allergies: Benadryl [diphenhydramine hcl]; Statins [statins]; Cephalexin; Morphine; Penicillins; and Sulfa antibiotics    Objective:     Vitals:    20 1053   BP: 132/83   Pulse: 66   Resp: 18   Temp: 98 °F (36.7 °C)   TempSrc: Oral   Weight: Comment: no weigh bed available   Height: 6' 2\" (1.88 m)     AAOx3, overweight, NAD  Dopplerable left distal pulses, foot a bit cool, slow cap refill  Mild LLE stage 2 lymphedema  No cellulitis, angitis, fluctuance  Chetna-ulcer skin: indurated, pink, warm and dry. Ulcer(s): still the four open spots on the upper back, 3 screws visible, a bit of hypergranulation, bloody drainage, no pus. Photos also saved in electronic chart.   Left heel with some peeling hyperkeratosis, left toe ulcer still a little fragile, right knee more durably healed, left knee and lower leg look good. Perineum with small area of denudation with some friction changes around it. Left great nail elongated, thickened, yellowed, and he's worried about it catching on his compression garments, and tearing    Today's Wound Measurements, per RN documentation:  Wound 08/16/17 #27, Thoracic spine CLUSTER, dehisced surgical wound, full thickness, onset 8/16/2017-Wound Length (cm): 7.5 cm    Wound 08/16/17 #27, Thoracic spine CLUSTER, dehisced surgical wound, full thickness, onset 8/16/2017-Wound Width (cm): 3.2 cm    Wound 08/16/17 #27, Thoracic spine CLUSTER, dehisced surgical wound, full thickness, onset 8/16/2017-Wound Depth (cm): 0.1 cm    Assessment:     Patient Active Problem List   Diagnosis Code    Mixed hyperlipidemia E78.2    Coronary artery disease involving native coronary artery of native heart without angina pectoris I25.10    Paraplegia, complete ([address] G82.21    Chronic back pain M54.9, G89.29    Arthritis M19.90    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  Lower extremity ulcers all healed; small recurrent areas of

## 2020-08-19 ENCOUNTER — TELEPHONE (OUTPATIENT)
Dept: INTERNAL MEDICINE CLINIC | Age: 55
End: 2020-08-19

## 2020-08-19 RX ORDER — POLYETHYLENE GLYCOL 3350 17 G/17G
POWDER, FOR SOLUTION ORAL
Qty: 510 G | Refills: 0 | Status: SHIPPED | OUTPATIENT
Start: 2020-08-19 | End: 2020-09-04 | Stop reason: SDUPTHER

## 2020-08-26 ENCOUNTER — HOSPITAL ENCOUNTER (OUTPATIENT)
Dept: WOUND CARE | Age: 55
Discharge: HOME | End: 2020-08-26
Payer: MEDICARE

## 2020-08-26 VITALS
HEART RATE: 63 BPM | RESPIRATION RATE: 18 BRPM | TEMPERATURE: 97.4 F | SYSTOLIC BLOOD PRESSURE: 116 MMHG | DIASTOLIC BLOOD PRESSURE: 73 MMHG

## 2020-08-26 PROCEDURE — 99213 OFFICE O/P EST LOW 20 MIN: CPT

## 2020-08-26 PROCEDURE — 99213 OFFICE O/P EST LOW 20 MIN: CPT | Performed by: INTERNAL MEDICINE

## 2020-08-26 RX ORDER — LIDOCAINE 40 MG/G
CREAM TOPICAL ONCE
Status: DISCONTINUED | OUTPATIENT
Start: 2020-08-26 | End: 2020-08-27 | Stop reason: HOSPADM

## 2020-08-26 ASSESSMENT — PAIN SCALES - GENERAL: PAINLEVEL_OUTOF10: 0

## 2020-08-26 NOTE — PLAN OF CARE
Back wound stable without much change, no debridement. Will cont. With current wound care regime. Right knee remains stable & healed, no further dressing needed at this time. Pt. To monitor closely & protect from trauma. Minor pressure/shearing noted to perineal area. Pt. Is cont. To do well with off-loading & spending most of his time in bed. Pt. Aware to cont. With Calmoseptine as needed. Dr Chris Mason discussed with patient that his back wound is palliative at this point. Dr Chris Mason & pt. Agreeable to cancel any f/u in [facility] at this time. Pt. To call if a new wound opens or with any questions/concerns. Dr Chris Mason agrees to order dressing supplies at this time. Discharge instructions reviewed with patient, all questions answered, copy given to patient. Dressings were applied to all wounds per M.D. Instructions at this visit.
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
## 2020-08-28 RX ORDER — DOCUSATE SODIUM 100 MG/1
CAPSULE, LIQUID FILLED ORAL
Qty: 180 CAPSULE | Refills: 0 | Status: SHIPPED | OUTPATIENT
Start: 2020-08-28

## 2020-08-28 RX ORDER — SENNA PLUS 8.6 MG/1
TABLET ORAL
Qty: 180 TABLET | Refills: 0 | Status: SHIPPED | OUTPATIENT
Start: 2020-08-28

## 2020-09-01 RX ORDER — TORSEMIDE 20 MG/1
TABLET ORAL
Qty: 180 TABLET | Refills: 0 | Status: SHIPPED | OUTPATIENT
Start: 2020-09-01 | End: 2021-01-26 | Stop reason: SDUPTHER

## 2020-09-04 ENCOUNTER — TELEPHONE (OUTPATIENT)
Dept: INTERNAL MEDICINE CLINIC | Age: 55
End: 2020-09-04

## 2020-09-04 RX ORDER — POLYETHYLENE GLYCOL 3350 17 G/17G
POWDER, FOR SOLUTION ORAL
Qty: 510 G | Refills: 0 | Status: SHIPPED | OUTPATIENT
Start: 2020-09-04

## 2020-09-04 NOTE — TELEPHONE ENCOUNTER
----- Message from Castillo Camargo MD sent at 9/4/2020  2:34 PM EDT -----  Contact: pt- [phone]  Can take miralax daily   Send script  ----- Message -----  From: Rodolfo Levison  Sent: 9/4/2020   1:17 PM EDT  To: Castillo Camargo MD    He has a colostomy bag and he has been having constipation even though he takes doculace and senna daily- he said he doesn't have bowel movements unless he takes miralax- he said it started after he had been on percocet and clindamyacin after a dental procedure-wanted to know what you recommend- [facility] in [place] [facility] - last appt- 7-20-20-next appt- 9-16-20-

## 2020-09-16 ENCOUNTER — OFFICE VISIT (OUTPATIENT)
Dept: INTERNAL MEDICINE CLINIC | Age: 55
End: 2020-09-16

## 2020-09-16 PROBLEM — L89.892 PRESSURE ULCER OF OTHER SITE, STAGE 2 (HCC): Status: ACTIVE | Noted: 2017-01-25

## 2020-09-16 PROCEDURE — 99213 OFFICE O/P EST LOW 20 MIN: CPT | Performed by: INTERNAL MEDICINE

## 2020-09-16 PROCEDURE — G8427 DOCREV CUR MEDS BY ELIG CLIN: HCPCS | Performed by: INTERNAL MEDICINE

## 2020-09-16 PROCEDURE — 2022F DILAT RTA XM EVC RTNOPTHY: CPT | Performed by: INTERNAL MEDICINE

## 2020-09-16 PROCEDURE — 1036F TOBACCO NON-USER: CPT | Performed by: INTERNAL MEDICINE

## 2020-09-16 PROCEDURE — 3052F HG A1C>EQUAL 8.0%<EQUAL 9.0%: CPT | Performed by: INTERNAL MEDICINE

## 2020-09-16 PROCEDURE — 3017F COLORECTAL CA SCREEN DOC REV: CPT | Performed by: INTERNAL MEDICINE

## 2020-09-16 PROCEDURE — G8417 CALC BMI ABV UP PARAM F/U: HCPCS | Performed by: INTERNAL MEDICINE
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
## 2020-09-17 VITALS — RESPIRATION RATE: 18 BRPM | HEART RATE: 70 BPM | SYSTOLIC BLOOD PRESSURE: 115 MMHG | DIASTOLIC BLOOD PRESSURE: 75 MMHG

## 2020-09-17 RX ORDER — LISINOPRIL 5 MG/1
TABLET ORAL
Qty: 90 TABLET | Refills: 0 | Status: SHIPPED | OUTPATIENT
Start: 2020-09-17 | End: 2020-12-14

## 2020-09-17 NOTE — PRE-PROCEDURE INSTRUCTIONS
.1.  Do not eat or drink anything after 12 midnight prior to surgery. This includes no water, chewing gum or mints. 2.  Take the following pills with a small sip of water on the morning of surgery 9/25/2020.  3.  Aspirin, Ibuprofen, Advil, Naproxen, Vitamin E and other Anti-inflammatory products should be stopped for 5 days before surgery or as directed by your physician. 4.  Check with your doctor regarding stopping Plavix, Coumadin, Lovenox, Fragmin or other blood thinners. 5.  Do not smoke and do not drink alcoholic beverages 24 hours prior to surgery. This includes NA Beer. 6.  You may brush your teeth and gargle the morning of surgery. DO NOT SWALLOW WATER.  7.  You MUST make arrangements for a responsible adult to take you home after your surgery. You will not be allowed to leave alone or drive yourself home. It is strongly suggested someone stay with you the first 24 hours. Your surgery will be cancelled if you do not have a ride home. 8.  A parent/legal guardian must accompany a child scheduled for surgery and plan to stay at the hospital until the child is discharged. Please do not bring other children with you. 9.  Please wear simple, loose fitting clothing to the hospital.  Trev Ojeda not bring valuables ( money, credit cards, checkbooks, etc.)  Do not wear any makeup (including no eye makeup) or nail polish on your fingers or toes. 10.  Do not wear any jewelry or piercing on the day of surgery. All body piercing jewelry must be removed. 11.  If you have dentures, they will be removed before going to the OR; we will provide you a container. If you wear contact lenses or glasses, they will be removed; please bring a case for them. 12.  Please see your family doctor/pediatrician for a history & physical and/or concerning medications. Bring any test results/reports from your physician's office the day of surgery.     13.  Remember to bring Blood Bank Bracelet to the hospital on the day of surgery. 14.  If you have a Living Will and Durable Power of  for Healthcare, please bring in a copy. 13.  Notify your Surgeon if you develop any illness between now and surgery time; cough, cold, fever, sore throat, nausea, vomiting, etc.  Please notify your surgeon if you experience dizziness, shortness of breath or blurred vision between now and the time of your surgery. 16.  DO NOT shave your operative site 96 hours (4 days) prior to surgery. For face and neck surgery, men may use an electric razor 48 hours (2 days) prior to surgery. 17. Shower the night before surgery and the morning of surgery with  an antibacterial soap   or  Chlorhexidine gluconate (for total joint replacement). To provide excellent care, visitors will be limited to two in a room at any given time. Please no children under the age of 15 in the surgical department.

## 2020-09-21 ENCOUNTER — HOSPITAL ENCOUNTER (OUTPATIENT)
Age: 55
Discharge: HOME | End: 2020-09-21
Payer: MEDICARE

## 2020-09-21 PROCEDURE — U0003 INFECTIOUS AGENT DETECTION BY NUCLEIC ACID (DNA OR RNA); SEVERE ACUTE RESPIRATORY SYNDROME CORONAVIRUS 2 (SARS-COV-2) (CORONAVIRUS DISEASE [COVID-19]), AMPLIFIED PROBE TECHNIQUE, MAKING USE OF HIGH THROUGHPUT TECHNOLOGIES AS DESCRIBED BY CMS-2020-01-R: HCPCS

## 2020-09-23 LAB — SARS-COV-2, NAA: NOT DETECTED

## 2020-09-23 RX ORDER — DOXYCYCLINE HYCLATE 100 MG
100 TABLET ORAL 2 TIMES DAILY
Qty: 14 TABLET | Refills: 0 | Status: SHIPPED | OUTPATIENT
Start: 2020-09-23 | End: 2020-09-30

## 2020-09-23 RX ORDER — CEFDINIR 300 MG/1
300 CAPSULE ORAL 2 TIMES DAILY
Qty: 14 CAPSULE | Refills: 0 | Status: SHIPPED | OUTPATIENT
Start: 2020-09-23 | End: 2020-09-30

## 2020-09-24 ENCOUNTER — TELEPHONE (OUTPATIENT)
Dept: WOUND CARE | Age: 55
End: 2020-09-24

## 2020-09-24 ENCOUNTER — ANESTHESIA EVENT (OUTPATIENT)
Dept: OPERATING ROOM | Age: 55
End: 2020-09-24
Payer: MEDICARE

## 2020-09-24 RX ORDER — METOPROLOL SUCCINATE 100 MG/1
TABLET, EXTENDED RELEASE ORAL
Qty: 90 TABLET | Refills: 0 | Status: SHIPPED | OUTPATIENT
Start: 2020-09-24 | End: 2020-12-14

## 2020-09-24 NOTE — TELEPHONE ENCOUNTER
wound infection flare before his eye surgery.     Electronically signed by Belen Hoffman MD on 9/24/2020 at 9:18 AM
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
## 2020-09-25 ENCOUNTER — ANESTHESIA (OUTPATIENT)
Dept: OPERATING ROOM | Age: 55
End: 2020-09-25
Payer: MEDICARE

## 2020-09-25 ENCOUNTER — HOSPITAL ENCOUNTER (OUTPATIENT)
Age: 55
Setting detail: OUTPATIENT SURGERY
Discharge: HOME | End: 2020-09-25
Attending: OPHTHALMOLOGY | Admitting: OPHTHALMOLOGY
Payer: MEDICARE

## 2020-09-25 VITALS
SYSTOLIC BLOOD PRESSURE: 106 MMHG | RESPIRATION RATE: 18 BRPM | TEMPERATURE: 98 F | OXYGEN SATURATION: 95 % | HEART RATE: 73 BPM | DIASTOLIC BLOOD PRESSURE: 55 MMHG

## 2020-09-25 VITALS
SYSTOLIC BLOOD PRESSURE: 98 MMHG | OXYGEN SATURATION: 100 % | RESPIRATION RATE: 19 BRPM | DIASTOLIC BLOOD PRESSURE: 69 MMHG

## 2020-09-25 LAB
GLUCOSE BLD-MCNC: 174 MG/DL (ref 70–99)
GLUCOSE BLD-MCNC: 243 MG/DL (ref 70–99)
PERFORMED ON: ABNORMAL
PERFORMED ON: ABNORMAL

## 2020-09-25 PROCEDURE — 7100000010 HC PHASE II RECOVERY - FIRST 15 MIN: Performed by: OPHTHALMOLOGY

## 2020-09-25 PROCEDURE — 2709999900 HC NON-CHARGEABLE SUPPLY: Performed by: OPHTHALMOLOGY

## 2020-09-25 PROCEDURE — 6370000000 HC RX 637 (ALT 250 FOR IP): Performed by: OPHTHALMOLOGY

## 2020-09-25 PROCEDURE — 7100000000 HC PACU RECOVERY - FIRST 15 MIN: Performed by: OPHTHALMOLOGY

## 2020-09-25 PROCEDURE — 2580000003 HC RX 258

## 2020-09-25 PROCEDURE — 2500000003 HC RX 250 WO HCPCS: Performed by: OPHTHALMOLOGY

## 2020-09-25 PROCEDURE — 2500000003 HC RX 250 WO HCPCS: Performed by: NURSE ANESTHETIST, CERTIFIED REGISTERED

## 2020-09-25 PROCEDURE — 6360000002 HC RX W HCPCS: Performed by: NURSE ANESTHETIST, CERTIFIED REGISTERED

## 2020-09-25 PROCEDURE — 3700000001 HC ADD 15 MINUTES (ANESTHESIA): Performed by: OPHTHALMOLOGY

## 2020-09-25 PROCEDURE — V2632 POST CHMBR INTRAOCULAR LENS: HCPCS | Performed by: OPHTHALMOLOGY

## 2020-09-25 PROCEDURE — 3600000003 HC SURGERY LEVEL 3 BASE: Performed by: OPHTHALMOLOGY

## 2020-09-25 PROCEDURE — 3700000000 HC ANESTHESIA ATTENDED CARE: Performed by: OPHTHALMOLOGY

## 2020-09-25 PROCEDURE — 7100000011 HC PHASE II RECOVERY - ADDTL 15 MIN: Performed by: OPHTHALMOLOGY

## 2020-09-25 PROCEDURE — 6360000002 HC RX W HCPCS: Performed by: OPHTHALMOLOGY

## 2020-09-25 PROCEDURE — 6370000000 HC RX 637 (ALT 250 FOR IP)

## 2020-09-25 PROCEDURE — 3600000013 HC SURGERY LEVEL 3 ADDTL 15MIN: Performed by: OPHTHALMOLOGY

## 2020-09-25 PROCEDURE — 7100000001 HC PACU RECOVERY - ADDTL 15 MIN: Performed by: OPHTHALMOLOGY

## 2020-09-25 PROCEDURE — 2580000003 HC RX 258: Performed by: ANESTHESIOLOGY

## 2020-09-25 DEVICE — ACRYSOF(R) IQ ASPHERIC IOL SP ACRYLIC FOLDABLELENS WULTRASERT(TM) DELIVERY SYSTEM UV WBLUE LIGHT FILTER. 13.0MM LENGTH 6.0MM ANTERIORASYMMETRIC BICONVEX OPTIC PLANAR HAPTICS.
Type: IMPLANTABLE DEVICE | Site: EYE | Status: FUNCTIONAL
Brand: ACRYSOF ULTRASERT

## 2020-09-25 RX ORDER — SODIUM CHLORIDE, SODIUM LACTATE, POTASSIUM CHLORIDE, CALCIUM CHLORIDE 600; 310; 30; 20 MG/100ML; MG/100ML; MG/100ML; MG/100ML
INJECTION, SOLUTION INTRAVENOUS CONTINUOUS
Status: DISCONTINUED | OUTPATIENT
Start: 2020-09-25 | End: 2020-09-25 | Stop reason: HOSPADM

## 2020-09-25 RX ORDER — OXYCODONE HYDROCHLORIDE AND ACETAMINOPHEN 5; 325 MG/1; MG/1
2 TABLET ORAL PRN
Status: DISCONTINUED | OUTPATIENT
Start: 2020-09-25 | End: 2020-09-25 | Stop reason: HOSPADM

## 2020-09-25 RX ORDER — EPHEDRINE SULFATE 50 MG/ML
INJECTION, SOLUTION INTRAVENOUS PRN
Status: DISCONTINUED | OUTPATIENT
Start: 2020-09-25 | End: 2020-09-25 | Stop reason: SDUPTHER

## 2020-09-25 RX ORDER — PILOCARPINE HYDROCHLORIDE 20 MG/ML
SOLUTION/ DROPS OPHTHALMIC PRN
Status: DISCONTINUED | OUTPATIENT
Start: 2020-09-25 | End: 2020-09-25 | Stop reason: ALTCHOICE

## 2020-09-25 RX ORDER — OXYCODONE HYDROCHLORIDE AND ACETAMINOPHEN 5; 325 MG/1; MG/1
1 TABLET ORAL PRN
Status: DISCONTINUED | OUTPATIENT
Start: 2020-09-25 | End: 2020-09-25 | Stop reason: HOSPADM

## 2020-09-25 RX ORDER — TETRACAINE HYDROCHLORIDE 5 MG/ML
SOLUTION OPHTHALMIC PRN
Status: DISCONTINUED | OUTPATIENT
Start: 2020-09-25 | End: 2020-09-25 | Stop reason: ALTCHOICE

## 2020-09-25 RX ORDER — MEPERIDINE HYDROCHLORIDE 25 MG/ML
12.5 INJECTION INTRAMUSCULAR; INTRAVENOUS; SUBCUTANEOUS EVERY 5 MIN PRN
Status: DISCONTINUED | OUTPATIENT
Start: 2020-09-25 | End: 2020-09-25 | Stop reason: HOSPADM

## 2020-09-25 RX ORDER — TOBRAMYCIN AND DEXAMETHASONE 3; 1 MG/ML; MG/ML
1 SUSPENSION/ DROPS OPHTHALMIC SEE ADMIN INSTRUCTIONS
Status: DISCONTINUED | OUTPATIENT
Start: 2020-09-25 | End: 2020-09-25 | Stop reason: HOSPADM

## 2020-09-25 RX ORDER — PROMETHAZINE HYDROCHLORIDE 25 MG/ML
6.25 INJECTION, SOLUTION INTRAMUSCULAR; INTRAVENOUS
Status: DISCONTINUED | OUTPATIENT
Start: 2020-09-25 | End: 2020-09-25 | Stop reason: HOSPADM

## 2020-09-25 RX ORDER — TOBRAMYCIN AND DEXAMETHASONE 3; 1 MG/ML; MG/ML
SUSPENSION/ DROPS OPHTHALMIC PRN
Status: DISCONTINUED | OUTPATIENT
Start: 2020-09-25 | End: 2020-09-25 | Stop reason: ALTCHOICE

## 2020-09-25 RX ORDER — PROPOFOL 10 MG/ML
INJECTION, EMULSION INTRAVENOUS PRN
Status: DISCONTINUED | OUTPATIENT
Start: 2020-09-25 | End: 2020-09-25 | Stop reason: SDUPTHER

## 2020-09-25 RX ORDER — HYDRALAZINE HYDROCHLORIDE 20 MG/ML
5 INJECTION INTRAMUSCULAR; INTRAVENOUS EVERY 10 MIN PRN
Status: DISCONTINUED | OUTPATIENT
Start: 2020-09-25 | End: 2020-09-25 | Stop reason: HOSPADM

## 2020-09-25 RX ORDER — ONDANSETRON 2 MG/ML
INJECTION INTRAMUSCULAR; INTRAVENOUS PRN
Status: DISCONTINUED | OUTPATIENT
Start: 2020-09-25 | End: 2020-09-25 | Stop reason: SDUPTHER

## 2020-09-25 RX ORDER — ONDANSETRON 2 MG/ML
4 INJECTION INTRAMUSCULAR; INTRAVENOUS
Status: DISCONTINUED | OUTPATIENT
Start: 2020-09-25 | End: 2020-09-25 | Stop reason: HOSPADM

## 2020-09-25 RX ORDER — FENTANYL CITRATE 50 UG/ML
25 INJECTION, SOLUTION INTRAMUSCULAR; INTRAVENOUS EVERY 5 MIN PRN
Status: DISCONTINUED | OUTPATIENT
Start: 2020-09-25 | End: 2020-09-25 | Stop reason: HOSPADM

## 2020-09-25 RX ORDER — PREDNISOLONE ACETATE 10 MG/ML
1 SUSPENSION/ DROPS OPHTHALMIC SEE ADMIN INSTRUCTIONS
Status: DISCONTINUED | OUTPATIENT
Start: 2020-09-25 | End: 2020-09-25 | Stop reason: HOSPADM

## 2020-09-25 RX ORDER — SODIUM CHLORIDE, SODIUM LACTATE, POTASSIUM CHLORIDE, CALCIUM CHLORIDE 600; 310; 30; 20 MG/100ML; MG/100ML; MG/100ML; MG/100ML
INJECTION, SOLUTION INTRAVENOUS
Status: COMPLETED
Start: 2020-09-25 | End: 2020-09-25

## 2020-09-25 RX ORDER — LIDOCAINE HYDROCHLORIDE 20 MG/ML
INJECTION, SOLUTION INFILTRATION; PERINEURAL PRN
Status: DISCONTINUED | OUTPATIENT
Start: 2020-09-25 | End: 2020-09-25 | Stop reason: SDUPTHER

## 2020-09-25 RX ORDER — SODIUM CHLORIDE, POTASSIUM CHLORIDE, CALCIUM CHLORIDE, MAGNESIUM CHLORIDE, SODIUM ACETATE, AND SODIUM CITRATE 6.4; .75; .48; .3; 3.9; 1.7 MG/ML; MG/ML; MG/ML; MG/ML; MG/ML; MG/ML
SOLUTION IRRIGATION PRN
Status: DISCONTINUED | OUTPATIENT
Start: 2020-09-25 | End: 2020-09-25 | Stop reason: ALTCHOICE

## 2020-09-25 RX ORDER — PREDNISOLONE ACETATE 10 MG/ML
SUSPENSION/ DROPS OPHTHALMIC PRN
Status: DISCONTINUED | OUTPATIENT
Start: 2020-09-25 | End: 2020-09-25 | Stop reason: ALTCHOICE

## 2020-09-25 RX ADMIN — EPHEDRINE SULFATE 10 MG: 50 INJECTION INTRAMUSCULAR; INTRAVENOUS; SUBCUTANEOUS at 10:34

## 2020-09-25 RX ADMIN — EPHEDRINE SULFATE 10 MG: 50 INJECTION INTRAMUSCULAR; INTRAVENOUS; SUBCUTANEOUS at 10:43

## 2020-09-25 RX ADMIN — Medication 0.3 ML: at 09:18

## 2020-09-25 RX ADMIN — EPHEDRINE SULFATE 10 MG: 50 INJECTION INTRAMUSCULAR; INTRAVENOUS; SUBCUTANEOUS at 10:48

## 2020-09-25 RX ADMIN — EPHEDRINE SULFATE 10 MG: 50 INJECTION INTRAMUSCULAR; INTRAVENOUS; SUBCUTANEOUS at 10:46

## 2020-09-25 RX ADMIN — PHENYLEPHRINE HYDROCHLORIDE 100 MCG: 10 INJECTION INTRAVENOUS at 10:30

## 2020-09-25 RX ADMIN — Medication 0.3 ML: at 09:32

## 2020-09-25 RX ADMIN — PHENYLEPHRINE HYDROCHLORIDE 100 MCG: 10 INJECTION INTRAVENOUS at 10:43

## 2020-09-25 RX ADMIN — PHENYLEPHRINE HYDROCHLORIDE 100 MCG: 10 INJECTION INTRAVENOUS at 10:28

## 2020-09-25 RX ADMIN — LIDOCAINE HYDROCHLORIDE 40 MG: 20 INJECTION, SOLUTION INFILTRATION; PERINEURAL at 10:20

## 2020-09-25 RX ADMIN — EPHEDRINE SULFATE 10 MG: 50 INJECTION INTRAMUSCULAR; INTRAVENOUS; SUBCUTANEOUS at 10:30

## 2020-09-25 RX ADMIN — PROPOFOL 200 MG: 10 INJECTION, EMULSION INTRAVENOUS at 10:20

## 2020-09-25 RX ADMIN — TOBRAMYCIN AND DEXAMETHASONE 1 DROP: 3; 1 SUSPENSION/ DROPS OPHTHALMIC at 11:56

## 2020-09-25 RX ADMIN — SODIUM CHLORIDE, POTASSIUM CHLORIDE, SODIUM LACTATE AND CALCIUM CHLORIDE: 600; 310; 30; 20 INJECTION, SOLUTION INTRAVENOUS at 10:18

## 2020-09-25 RX ADMIN — BROMFENAC SODIUM: 0.7 SOLUTION/ DROPS OPHTHALMIC at 09:17

## 2020-09-25 RX ADMIN — Medication 0.3 ML: at 09:46

## 2020-09-25 RX ADMIN — SODIUM CHLORIDE, POTASSIUM CHLORIDE, SODIUM LACTATE AND CALCIUM CHLORIDE 1000 ML: 600; 310; 30; 20 INJECTION, SOLUTION INTRAVENOUS at 09:19

## 2020-09-25 RX ADMIN — ONDANSETRON 4 MG: 2 INJECTION, SOLUTION INTRAMUSCULAR; INTRAVENOUS at 10:20

## 2020-09-25 ASSESSMENT — PULMONARY FUNCTION TESTS
PIF_VALUE: 9
PIF_VALUE: 8
PIF_VALUE: 8
PIF_VALUE: 3
PIF_VALUE: 2
PIF_VALUE: 21
PIF_VALUE: 7
PIF_VALUE: 4
PIF_VALUE: 3
PIF_VALUE: 8
PIF_VALUE: 7
PIF_VALUE: 0
PIF_VALUE: 8
PIF_VALUE: 0
PIF_VALUE: 1
PIF_VALUE: 0
PIF_VALUE: 5
PIF_VALUE: 5
PIF_VALUE: 4
PIF_VALUE: 3
PIF_VALUE: 8
PIF_VALUE: 19
PIF_VALUE: 8
PIF_VALUE: 4
PIF_VALUE: 22
PIF_VALUE: 11
PIF_VALUE: 0
PIF_VALUE: 2
PIF_VALUE: 13
PIF_VALUE: 0
PIF_VALUE: 8
PIF_VALUE: 8
PIF_VALUE: 0
PIF_VALUE: 13
PIF_VALUE: 9
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
## 2020-09-25 NOTE — OP NOTE
OPERATIVE NOTE    Patient Name   Date of Birth Age  MRN#  Lizzie Analia   1965   48 y.o.  [id number]       Surgeon        Surgery Date  Maisha Ceron        9/25/2020       Preoperative Diagnosis: Nuclear Sclerotic Cataract left eye    Postoperative Diagnosis: Nuclear Sclerotic Cataract left eye    Operative Procedure: Phacoemulsification/ Posterior Chamber Intraocular Lens Implantation          Anesthesia:   General/LMA with Topical Tetracaine    Details of Procedure: The patient was brought to the operating room on the operative stretcher in the supine position with the head resting in the head rest.  After appropriate anesthesia monitoring devices were applied, IV sedation was carried out per the anesthesia service. The LMA device was introduced and general anesthesia was induced by the anesthesia service. The operative eye was prepped and draped in the usual sterile fashion. Tobradex drops and Tetracaine drops were administered. A wire lid speculum was then inserted into the operative eye. A paracentesis was then performed using a 15 degree supersharp blade to enter the globe at the limbus, and a .12 mm colibri forceps was used to stabilize the globe. Viscoat was then instilled into the anterior chamber. A temporal clear cornea groove was then created using the 15 degree supersharp blade. The cornea was then entered using the Mode 2.4 mm clearcut keratome blade. The capsulotomy was then performed using a cystotome, and the capsulorrhexis was completed using uttrata forceps. The nucleus of the cataract was then hydrodissected and hydrodelineated using sterile BSS on a 27 gauge cannula. The nucleus of the cataract was then removed using the phacoemilsification/aspiration device. This was performed in a bimanual/CHOP technique. The remaining cortex was then removed using the irrigation/aspiration device. The posterior capsule was polished using the I/A device with CAP/VAC settings.   The capsular bag was reformed using [employer] The previously selected Mode Acrysof +17.5 diopter AU00T0 intraocular lens implant was then inserted using the cartridge system. It was spun in place using a Kuglen hook. It was centered and found to be in good, stable position. The remaining viscoelastic was then removed using the I/A device. The corneal incision was then hydrated using sterile BSS on a 30 gauge cannula. It was checked and found to be water-tight. Pilocarpine 2%, followed by Tobradex ophthalmic solutions were then instilled into the operative eye. The wire lid speculum was removed, and a postoperative protective shield was applied. The LMA device was then removed and the patient was easily aroused. The patient was then transferred to the postanesthesia recovery unit in good stable condition. The patient tolerated the procedure well without complications.   A postoperative instruction sheet was given, and the patient will follow up with Dr. Benedict Pagan office as scheduled      EBL:  None    Specimen:  noneOperative Note      Patient: Tera Rosa  YOB: 1965  MRN: [id number]    Date of Procedure: 9/25/2020    Pre-Op Diagnosis: CATARACT LEFT EYE    Post-Op Diagnosis: Same       Procedure(s):  PHACO EMULSIFICATION OF CATARACT WITH INTRAOCULAR LENS IMPLANT EYE    Surgeon(s):  Efrain Oglesby MD    Assistant:   * No surgical staff found *    Anesthesia: General    Estimated Blood Loss (mL): none    Complications: None    Specimens:   * No specimens in log *    Implants:  * No implants in log *      Drains:   Colostomy LUQ Transverse (Active)       Findings:     Detailed Description of Procedure:       Electronically signed by Efrain Oglesby MD on 9/25/2020 at 10:25 AM

## 2020-09-25 NOTE — ANESTHESIA POSTPROCEDURE EVALUATION
Department of Anesthesiology  Postprocedure Note    Patient: Ismael Tidwell  MRN: [id number]  YOB: 1965  Date of evaluation: 9/25/2020    Procedure Summary     Date:  09/25/20 Room / Location:  [place] [address] / [facility]    Anesthesia Start:  1018 Anesthesia Stop:  3574    Procedure:  PHACO EMULSIFICATION OF CATARACT WITH INTRAOCULAR LENS IMPLANT EYE (Left Eye) Diagnosis:  (CATARACT LEFT EYE)    Surgeon:  Eliazar Benoit MD Responsible Provider:  Oanh Arambula MD    Anesthesia Type:  general ASA Status:  4        Anesthesia Type: general    Cleve Phase I: Cleve Score: 9    Cleve Phase II: Cleve Score: 9    Last vitals: Reviewed and per EMR flowsheets.      Anesthesia Post Evaluation   Anesthetic Problems: no   Cardiovascular System Stable: yes  Respiratory Function: Airway Patent yes  ETT no  Ventilator no  Level of consciousness: awake, alert and oriented  Post-op pain: adequate analgesia  Hydration Adequate: yes  Nausea/Vomiting:no  Other Issues:     Mackey Najjar, MD

## 2020-09-25 NOTE — PROGRESS NOTES
NO CHANGE IN PHYSICAL ASSESSMENT; PT AND CARETAKER VERBALIZE UNDERSTANDING OF INSTRUCTIONS; PT DISCHARGED VIA CART BY NURSE IN STABLE CONDITION; TAKEN TO WOUND CARE FOR HIS APPOINTMENT AND WILL GO HOME WITH CARETAKER FROM THERE; NO C/O

## 2020-10-01 ENCOUNTER — TELEPHONE (OUTPATIENT)
Dept: INTERNAL MEDICINE CLINIC | Age: 55
End: 2020-10-01

## 2020-10-01 NOTE — TELEPHONE ENCOUNTER
----- Message from Alecia Guerrero MD sent at 10/1/2020 12:52 PM EDT -----  CBCD and UA  ----- Message -----  From: Vibha Solano  Sent: 10/1/2020  11:42 AM EDT  To: MD Dr Willis Samayoa patient was on an antibiotic for seven days from another Dr and he wanted Dr Willis Quiñones to see if he would give him an order for bloodwork his home health nurse would take his blood for him wants to see if he might have an infection or a UTI Please advise his number is [phone] Thanks [facility]

## 2020-10-02 ENCOUNTER — TELEPHONE (OUTPATIENT)
Dept: INTERNAL MEDICINE CLINIC | Age: 55
End: 2020-10-02

## 2020-10-02 ENCOUNTER — HOSPITAL ENCOUNTER (OUTPATIENT)
Age: 55
Setting detail: SPECIMEN
Discharge: HOME | End: 2020-10-02
Payer: MEDICARE

## 2020-10-02 LAB
BACTERIA: ABNORMAL /HPF
BASOPHILS ABSOLUTE: 0.1 K/UL (ref 0–0.2)
BASOPHILS RELATIVE PERCENT: 1 %
BILIRUBIN URINE: NEGATIVE
BLOOD, URINE: ABNORMAL
CLARITY: ABNORMAL
COLOR: YELLOW
EOSINOPHILS ABSOLUTE: 0.2 K/UL (ref 0–0.6)
EOSINOPHILS RELATIVE PERCENT: 2.3 %
GLUCOSE URINE: 500 MG/DL
HCT VFR BLD CALC: 37.3 % (ref 40.5–52.5)
HEMOGLOBIN: 12.3 G/DL (ref 13.5–17.5)
KETONES, URINE: NEGATIVE MG/DL
LEUKOCYTE ESTERASE, URINE: ABNORMAL
LYMPHOCYTES ABSOLUTE: 1.4 K/UL (ref 1–5.1)
LYMPHOCYTES RELATIVE PERCENT: 15.3 %
MCH RBC QN AUTO: 26 PG (ref 26–34)
MCHC RBC AUTO-ENTMCNC: 32.9 G/DL (ref 31–36)
MCV RBC AUTO: 79.1 FL (ref 80–100)
MICROSCOPIC EXAMINATION: YES
MONOCYTES ABSOLUTE: 0.7 K/UL (ref 0–1.3)
MONOCYTES RELATIVE PERCENT: 7.4 %
NEUTROPHILS ABSOLUTE: 6.8 K/UL (ref 1.7–7.7)
NEUTROPHILS RELATIVE PERCENT: 74 %
NITRITE, URINE: POSITIVE
PDW BLD-RTO: 17.1 % (ref 12.4–15.4)
PH UA: 6.5 (ref 5–8)
PLATELET # BLD: 251 K/UL (ref 135–450)
PMV BLD AUTO: 10.3 FL (ref 5–10.5)
PROTEIN UA: 30 MG/DL
RBC # BLD: 4.72 M/UL (ref 4.2–5.9)
RBC UA: ABNORMAL /HPF (ref 0–4)
SPECIFIC GRAVITY UA: 1.01 (ref 1–1.03)
URINE TYPE: ABNORMAL
UROBILINOGEN, URINE: 0.2 E.U./DL
WBC # BLD: 9.2 K/UL (ref 4–11)
WBC UA: >100 /HPF (ref 0–5)

## 2020-10-02 PROCEDURE — 87186 SC STD MICRODIL/AGAR DIL: CPT

## 2020-10-02 PROCEDURE — 36415 COLL VENOUS BLD VENIPUNCTURE: CPT

## 2020-10-02 PROCEDURE — 81001 URINALYSIS AUTO W/SCOPE: CPT

## 2020-10-02 PROCEDURE — 85025 COMPLETE CBC W/AUTO DIFF WBC: CPT

## 2020-10-02 PROCEDURE — 87077 CULTURE AEROBIC IDENTIFY: CPT

## 2020-10-02 PROCEDURE — 87086 URINE CULTURE/COLONY COUNT: CPT

## 2020-10-02 RX ORDER — CIPROFLOXACIN 500 MG/1
500 TABLET, FILM COATED ORAL 2 TIMES DAILY
Qty: 14 TABLET | Refills: 0 | Status: SHIPPED | OUTPATIENT
Start: 2020-10-02 | End: 2020-10-09

## 2020-10-02 NOTE — TELEPHONE ENCOUNTER
----- Message from Malia Winkler PA-C sent at 10/2/2020  4:00 PM EDT -----  Make sure that urine is sent for culture please. Send Cipro 500 mg BID x 7 days (disp 14, zero refills) to his pharmacy.    ----- Message -----  From: Bunny Martínez  Sent: 10/2/2020   2:56 PM EDT  To: Malia Winkler PA-C    Per Hannah Almonte \"His WBC is fine. His urine looks infected. Does he have a chronic langley or supra pubic catheter? Is he having symptoms of infection? \"    Pt does have a straight cath. States he had sediment in his urine today when home health nurse came.

## 2020-10-04 LAB
ORGANISM: ABNORMAL
ORGANISM: ABNORMAL
URINE CULTURE, ROUTINE: ABNORMAL

## 2020-10-15 ENCOUNTER — TELEPHONE (OUTPATIENT)
Dept: INTERNAL MEDICINE CLINIC | Age: 55
End: 2020-10-15

## 2020-11-19 ENCOUNTER — HOSPITAL ENCOUNTER (OUTPATIENT)
Age: 55
Setting detail: SPECIMEN
Discharge: HOME | End: 2020-11-19
Payer: MEDICARE

## 2020-11-19 PROCEDURE — 87070 CULTURE OTHR SPECIMN AEROBIC: CPT

## 2020-11-19 PROCEDURE — 87205 SMEAR GRAM STAIN: CPT

## 2020-11-19 PROCEDURE — 87186 SC STD MICRODIL/AGAR DIL: CPT

## 2020-11-19 PROCEDURE — 87077 CULTURE AEROBIC IDENTIFY: CPT

## 2020-11-23 ENCOUNTER — TELEPHONE (OUTPATIENT)
Dept: INTERNAL MEDICINE CLINIC | Age: 55
End: 2020-11-23

## 2020-11-23 LAB
GRAM STAIN RESULT: ABNORMAL
ORGANISM: ABNORMAL
WOUND/ABSCESS: ABNORMAL

## 2020-11-23 RX ORDER — CEFDINIR 300 MG/1
300 CAPSULE ORAL 2 TIMES DAILY
Qty: 20 CAPSULE | Refills: 0 | Status: SHIPPED | OUTPATIENT
Start: 2020-11-23 | End: 2020-12-03

## 2020-11-23 RX ORDER — LINEZOLID 600 MG/1
600 TABLET, FILM COATED ORAL 2 TIMES DAILY
Qty: 20 TABLET | Refills: 0 | Status: SHIPPED | OUTPATIENT
Start: 2020-11-23 | End: 2020-12-03

## 2020-11-23 RX ORDER — INSULIN GLARGINE 100 [IU]/ML
INJECTION, SOLUTION SUBCUTANEOUS
Qty: 30 ML | Refills: 2 | Status: SHIPPED | OUTPATIENT
Start: 2020-11-23 | End: 2021-01-26 | Stop reason: SDUPTHER

## 2020-11-23 NOTE — TELEPHONE ENCOUNTER
----- Message from Duncan Villegas MD sent at 11/23/2020  1:36 PM EST -----  Please refill or give samples - humalog  Would increase lantus by 5 units as well   ----- Message -----  From: Yanni Blackdwayne  Sent: 11/23/2020   1:07 PM EST  To: Duncan Villegas MD    Patient states he has been under wound care for several infections including MRSA. Due to the infections his sugar has been very high and requiring more Humalog. He has not increased his Lantus but has had to increase his Humalog intake substantially. He has tried to get refills on his Humalog but the insurance is denying to pay for refills before 11/29/20. He has only 60 units of Humalog left. He is ok on Lantus. Please advise.  Thank you tsh

## 2020-11-23 NOTE — TELEPHONE ENCOUNTER
----- Message from Brown Dinero MD sent at 11/23/2020  1:36 PM EST -----  Please refill or give samples - humalog  Would increase lantus by 5 units as well   ----- Message -----  From: Bri Khan  Sent: 11/23/2020   1:07 PM EST  To: Brown Dinero MD    Patient states he has been under wound care for several infections including MRSA. Due to the infections his sugar has been very high and requiring more Humalog. He has not increased his Lantus but has had to increase his Humalog intake substantially. He has tried to get refills on his Humalog but the insurance is denying to pay for refills before 11/29/20. He has only 60 units of Humalog left. He is ok on Lantus. Please advise.  Thank you tsh

## 2020-11-24 ENCOUNTER — TELEPHONE (OUTPATIENT)
Dept: WOUND CARE | Age: 55
End: 2020-11-24

## 2020-11-24 NOTE — TELEPHONE ENCOUNTER
bedside when needed. For the MRSA, we would have the option of dalbavancin at times, if needed, but Gram-negative options might be harder to come by in the future. I'll speak with . Nataliia Feliciano again in the coming days to see how he's doing, or he can contact me at any time with more immediate questions or concerns (fever, failure of symptoms to improve, intolerance of Abx, etc).     Electronically signed by Robert Roe MD on 11/24/2020 at 10:00 AM

## 2020-12-09 RX ORDER — APIXABAN 5 MG/1
TABLET, FILM COATED ORAL
Qty: 180 TABLET | Refills: 0 | Status: SHIPPED | OUTPATIENT
Start: 2020-12-09 | End: 2021-01-26 | Stop reason: SDUPTHER

## 2020-12-14 RX ORDER — METOPROLOL SUCCINATE 100 MG/1
TABLET, EXTENDED RELEASE ORAL
Qty: 90 TABLET | Refills: 0 | Status: SHIPPED | OUTPATIENT
Start: 2020-12-14 | End: 2021-01-26 | Stop reason: SDUPTHER

## 2020-12-14 RX ORDER — LISINOPRIL 5 MG/1
TABLET ORAL
Qty: 90 TABLET | Refills: 0 | Status: SHIPPED | OUTPATIENT
Start: 2020-12-14 | End: 2021-01-26 | Stop reason: SDUPTHER

## 2020-12-29 RX ORDER — PANTOPRAZOLE SODIUM 40 MG/1
TABLET, DELAYED RELEASE ORAL
Qty: 90 TABLET | Refills: 0 | Status: SHIPPED | OUTPATIENT
Start: 2020-12-29 | End: 2021-01-26 | Stop reason: SDUPTHER

## 2021-01-01 ENCOUNTER — TELEPHONE (OUTPATIENT)
Dept: INTERNAL MEDICINE CLINIC | Age: 56
End: 2021-01-01

## 2021-01-01 ENCOUNTER — ANESTHESIA EVENT (OUTPATIENT)
Dept: OPERATING ROOM | Age: 56
End: 2021-01-01

## 2021-01-01 ENCOUNTER — HOSPITAL ENCOUNTER (EMERGENCY)
Age: 56
Discharge: HOME | End: 2021-08-24
Payer: MEDICARE

## 2021-01-01 ENCOUNTER — APPOINTMENT (OUTPATIENT)
Dept: INTERVENTIONAL RADIOLOGY/VASCULAR | Age: 56
DRG: 871 | End: 2021-01-01
Payer: MEDICARE

## 2021-01-01 ENCOUNTER — HOSPITAL ENCOUNTER (OUTPATIENT)
Dept: WOUND CARE | Age: 56
Discharge: HOME | End: 2021-11-12
Payer: MEDICARE

## 2021-01-01 ENCOUNTER — HOSPITAL ENCOUNTER (OUTPATIENT)
Dept: WOUND CARE | Age: 56
Discharge: HOME | End: 2021-07-16
Payer: MEDICARE

## 2021-01-01 ENCOUNTER — APPOINTMENT (OUTPATIENT)
Dept: CT IMAGING | Age: 56
DRG: 673 | End: 2021-01-01
Payer: MEDICARE

## 2021-01-01 ENCOUNTER — CARE COORDINATION (OUTPATIENT)
Dept: CASE MANAGEMENT | Age: 56
End: 2021-01-01

## 2021-01-01 ENCOUNTER — TELEPHONE (OUTPATIENT)
Dept: SURGERY | Age: 56
End: 2021-01-01

## 2021-01-01 ENCOUNTER — APPOINTMENT (OUTPATIENT)
Dept: GENERAL RADIOLOGY | Age: 56
DRG: 683 | End: 2021-01-01
Payer: MEDICARE

## 2021-01-01 ENCOUNTER — HOSPITAL ENCOUNTER (OUTPATIENT)
Dept: WOUND CARE | Age: 56
Discharge: HOME | End: 2021-05-28
Payer: MEDICARE

## 2021-01-01 ENCOUNTER — HOSPITAL ENCOUNTER (OUTPATIENT)
Dept: WOUND CARE | Age: 56
Discharge: HOME | End: 2021-08-13
Payer: MEDICARE

## 2021-01-01 ENCOUNTER — HOSPITAL ENCOUNTER (EMERGENCY)
Age: 56
Discharge: HOME | End: 2021-10-12
Attending: STUDENT IN AN ORGANIZED HEALTH CARE EDUCATION/TRAINING PROGRAM
Payer: MEDICARE

## 2021-01-01 ENCOUNTER — HOSPITAL ENCOUNTER (OUTPATIENT)
Age: 56
Discharge: HOME | End: 2021-06-25
Payer: MEDICARE

## 2021-01-01 ENCOUNTER — HOSPITAL ENCOUNTER (OUTPATIENT)
Dept: WOUND CARE | Age: 56
Discharge: HOME | End: 2021-10-01
Payer: MEDICARE

## 2021-01-01 ENCOUNTER — CARE COORDINATION (OUTPATIENT)
Dept: CARE COORDINATION | Age: 56
End: 2021-01-01

## 2021-01-01 ENCOUNTER — APPOINTMENT (OUTPATIENT)
Dept: GENERAL RADIOLOGY | Age: 56
DRG: 871 | End: 2021-01-01
Payer: MEDICARE

## 2021-01-01 ENCOUNTER — HOSPITAL ENCOUNTER (OUTPATIENT)
Dept: WOUND CARE | Age: 56
Discharge: HOME | End: 2021-08-27
Payer: MEDICARE

## 2021-01-01 ENCOUNTER — APPOINTMENT (OUTPATIENT)
Dept: CT IMAGING | Age: 56
DRG: 871 | End: 2021-01-01
Payer: MEDICARE

## 2021-01-01 ENCOUNTER — HOSPITAL ENCOUNTER (INPATIENT)
Age: 56
LOS: 4 days | Discharge: HOME | DRG: 683 | End: 2021-09-09
Attending: STUDENT IN AN ORGANIZED HEALTH CARE EDUCATION/TRAINING PROGRAM | Admitting: INTERNAL MEDICINE
Payer: MEDICARE

## 2021-01-01 ENCOUNTER — HOSPITAL ENCOUNTER (OUTPATIENT)
Dept: PULMONOLOGY | Age: 56
Discharge: HOME | End: 2021-06-29
Payer: MEDICARE

## 2021-01-01 ENCOUNTER — HOSPITAL ENCOUNTER (OUTPATIENT)
Dept: WOUND CARE | Age: 56
Discharge: HOME | End: 2021-11-19
Payer: MEDICARE

## 2021-01-01 ENCOUNTER — HOSPITAL ENCOUNTER (OUTPATIENT)
Dept: WOUND CARE | Age: 56
Discharge: HOME | End: 2021-11-05
Payer: MEDICARE

## 2021-01-01 ENCOUNTER — APPOINTMENT (OUTPATIENT)
Dept: GENERAL RADIOLOGY | Age: 56
DRG: 673 | End: 2021-01-01
Payer: MEDICARE

## 2021-01-01 ENCOUNTER — ANESTHESIA (OUTPATIENT)
Dept: OPERATING ROOM | Age: 56
End: 2021-01-01

## 2021-01-01 ENCOUNTER — HOSPITAL ENCOUNTER (OUTPATIENT)
Dept: WOUND CARE | Age: 56
Discharge: HOME | End: 2021-06-04
Payer: MEDICARE

## 2021-01-01 ENCOUNTER — INITIAL CONSULT (OUTPATIENT)
Dept: SURGERY | Age: 56
End: 2021-01-01
Payer: MEDICARE

## 2021-01-01 ENCOUNTER — ANESTHESIA (OUTPATIENT)
Dept: OPERATING ROOM | Age: 56
DRG: 673 | End: 2021-01-01
Payer: MEDICARE

## 2021-01-01 ENCOUNTER — HOSPITAL ENCOUNTER (OUTPATIENT)
Dept: WOUND CARE | Age: 56
Discharge: HOME | End: 2021-10-15
Payer: MEDICARE

## 2021-01-01 ENCOUNTER — ANESTHESIA EVENT (OUTPATIENT)
Dept: ENDOSCOPY | Age: 56
DRG: 871 | End: 2021-01-01
Payer: MEDICARE

## 2021-01-01 ENCOUNTER — HOSPITAL ENCOUNTER (OUTPATIENT)
Dept: WOUND CARE | Age: 56
Discharge: HOME | End: 2021-12-17
Payer: MEDICARE

## 2021-01-01 ENCOUNTER — HOSPITAL ENCOUNTER (OUTPATIENT)
Dept: WOUND CARE | Age: 56
Discharge: HOME | End: 2021-07-30
Payer: MEDICARE

## 2021-01-01 ENCOUNTER — TELEPHONE (OUTPATIENT)
Dept: PULMONOLOGY | Age: 56
End: 2021-01-01

## 2021-01-01 ENCOUNTER — APPOINTMENT (OUTPATIENT)
Dept: MRI IMAGING | Age: 56
DRG: 871 | End: 2021-01-01
Payer: MEDICARE

## 2021-01-01 ENCOUNTER — TELEPHONE (OUTPATIENT)
Dept: CARDIOLOGY CLINIC | Age: 56
End: 2021-01-01

## 2021-01-01 ENCOUNTER — HOSPITAL ENCOUNTER (OUTPATIENT)
Dept: WOUND CARE | Age: 56
Discharge: HOME | End: 2021-07-23

## 2021-01-01 ENCOUNTER — APPOINTMENT (OUTPATIENT)
Dept: ULTRASOUND IMAGING | Age: 56
DRG: 683 | End: 2021-01-01
Payer: MEDICARE

## 2021-01-01 ENCOUNTER — ANESTHESIA (OUTPATIENT)
Dept: ENDOSCOPY | Age: 56
DRG: 871 | End: 2021-01-01
Payer: MEDICARE

## 2021-01-01 ENCOUNTER — OFFICE VISIT (OUTPATIENT)
Dept: CARDIOLOGY CLINIC | Age: 56
End: 2021-01-01
Payer: MEDICARE

## 2021-01-01 ENCOUNTER — HOSPITAL ENCOUNTER (OUTPATIENT)
Dept: WOUND CARE | Age: 56
Discharge: HOME | End: 2021-10-22
Payer: MEDICARE

## 2021-01-01 ENCOUNTER — HOSPITAL ENCOUNTER (OUTPATIENT)
Age: 56
Setting detail: OUTPATIENT SURGERY
Discharge: HOME | DRG: 673 | End: 2021-09-14
Attending: SURGERY | Admitting: SURGERY
Payer: MEDICARE

## 2021-01-01 ENCOUNTER — HOSPITAL ENCOUNTER (OUTPATIENT)
Dept: WOUND CARE | Age: 56
Discharge: HOME | End: 2021-06-25
Payer: MEDICARE

## 2021-01-01 ENCOUNTER — OFFICE VISIT (OUTPATIENT)
Dept: PULMONOLOGY | Age: 56
End: 2021-01-01
Payer: MEDICARE

## 2021-01-01 ENCOUNTER — TELEPHONE (OUTPATIENT)
Dept: OTHER | Facility: CLINIC | Age: 56
End: 2021-01-01

## 2021-01-01 ENCOUNTER — HOSPITAL ENCOUNTER (OUTPATIENT)
Dept: WOUND CARE | Age: 56
Discharge: HOME | End: 2021-12-03
Payer: MEDICARE

## 2021-01-01 ENCOUNTER — HOSPITAL ENCOUNTER (OUTPATIENT)
Dept: WOUND CARE | Age: 56
Discharge: HOME | End: 2021-07-02
Payer: MEDICARE

## 2021-01-01 ENCOUNTER — HOSPITAL ENCOUNTER (OUTPATIENT)
Dept: WOUND CARE | Age: 56
Discharge: HOME | End: 2021-09-24
Payer: MEDICARE

## 2021-01-01 ENCOUNTER — APPOINTMENT (OUTPATIENT)
Dept: GENERAL RADIOLOGY | Age: 56
DRG: 291 | End: 2021-01-01
Payer: MEDICARE

## 2021-01-01 ENCOUNTER — HOSPITAL ENCOUNTER (OUTPATIENT)
Dept: WOUND CARE | Age: 56
Discharge: HOME | End: 2021-09-10
Payer: MEDICARE

## 2021-01-01 ENCOUNTER — HOSPITAL ENCOUNTER (OUTPATIENT)
Age: 56
Setting detail: OUTPATIENT SURGERY
Discharge: HOME | End: 2021-09-01
Attending: SURGERY | Admitting: SURGERY
Payer: MEDICARE

## 2021-01-01 ENCOUNTER — VIRTUAL VISIT (OUTPATIENT)
Dept: PULMONOLOGY | Age: 56
End: 2021-01-01
Payer: MEDICARE

## 2021-01-01 ENCOUNTER — HOSPITAL ENCOUNTER (OUTPATIENT)
Dept: WOUND CARE | Age: 56
Discharge: HOME | End: 2021-06-11
Payer: MEDICARE

## 2021-01-01 ENCOUNTER — HOSPITAL ENCOUNTER (INPATIENT)
Age: 56
LOS: 16 days | Discharge: HOME | DRG: 871 | End: 2021-07-22
Attending: STUDENT IN AN ORGANIZED HEALTH CARE EDUCATION/TRAINING PROGRAM | Admitting: INTERNAL MEDICINE
Payer: MEDICARE

## 2021-01-01 ENCOUNTER — HOSPITAL ENCOUNTER (OUTPATIENT)
Dept: WOUND CARE | Age: 56
Discharge: HOME | End: 2021-06-18
Payer: MEDICARE

## 2021-01-01 ENCOUNTER — HOSPITAL ENCOUNTER (OUTPATIENT)
Dept: WOUND CARE | Age: 56
Discharge: HOME | End: 2021-10-08
Payer: MEDICARE

## 2021-01-01 ENCOUNTER — APPOINTMENT (OUTPATIENT)
Dept: CT IMAGING | Age: 56
DRG: 683 | End: 2021-01-01
Payer: MEDICARE

## 2021-01-01 ENCOUNTER — APPOINTMENT (OUTPATIENT)
Dept: INTERVENTIONAL RADIOLOGY/VASCULAR | Age: 56
DRG: 673 | End: 2021-01-01
Payer: MEDICARE

## 2021-01-01 ENCOUNTER — HOSPITAL ENCOUNTER (OUTPATIENT)
Dept: WOUND CARE | Age: 56
Discharge: HOME | End: 2021-09-17
Payer: MEDICARE

## 2021-01-01 ENCOUNTER — ANESTHESIA EVENT (OUTPATIENT)
Dept: OPERATING ROOM | Age: 56
DRG: 673 | End: 2021-01-01
Payer: MEDICARE

## 2021-01-01 ENCOUNTER — HOSPITAL ENCOUNTER (INPATIENT)
Age: 56
LOS: 2 days | Discharge: HOME HEALTH | DRG: 291 | End: 2021-12-31
Attending: STUDENT IN AN ORGANIZED HEALTH CARE EDUCATION/TRAINING PROGRAM | Admitting: INTERNAL MEDICINE
Payer: MEDICARE

## 2021-01-01 ENCOUNTER — HOSPITAL ENCOUNTER (INPATIENT)
Age: 56
LOS: 14 days | Discharge: NURSING, REHAB OR LONG-TERM CARE | DRG: 673 | End: 2021-10-01
Attending: EMERGENCY MEDICINE | Admitting: INTERNAL MEDICINE
Payer: MEDICARE

## 2021-01-01 ENCOUNTER — VIRTUAL VISIT (OUTPATIENT)
Dept: INTERNAL MEDICINE CLINIC | Age: 56
End: 2021-01-01

## 2021-01-01 ENCOUNTER — HOSPITAL ENCOUNTER (OUTPATIENT)
Dept: WOUND CARE | Age: 56
Discharge: HOME | End: 2021-10-29
Payer: MEDICARE

## 2021-01-01 VITALS — SYSTOLIC BLOOD PRESSURE: 84 MMHG | OXYGEN SATURATION: 83 % | DIASTOLIC BLOOD PRESSURE: 52 MMHG

## 2021-01-01 VITALS — HEART RATE: 109 BPM | SYSTOLIC BLOOD PRESSURE: 121 MMHG | DIASTOLIC BLOOD PRESSURE: 71 MMHG | TEMPERATURE: 97.7 F

## 2021-01-01 VITALS
TEMPERATURE: 98 F | HEIGHT: 74 IN | SYSTOLIC BLOOD PRESSURE: 115 MMHG | BODY MASS INDEX: 32.35 KG/M2 | DIASTOLIC BLOOD PRESSURE: 72 MMHG | HEART RATE: 78 BPM | RESPIRATION RATE: 18 BRPM

## 2021-01-01 VITALS
DIASTOLIC BLOOD PRESSURE: 78 MMHG | BODY MASS INDEX: 32.34 KG/M2 | TEMPERATURE: 97.6 F | OXYGEN SATURATION: 96 % | HEIGHT: 74 IN | HEART RATE: 96 BPM | WEIGHT: 252 LBS | SYSTOLIC BLOOD PRESSURE: 128 MMHG

## 2021-01-01 VITALS
HEART RATE: 90 BPM | WEIGHT: 246 LBS | HEIGHT: 74 IN | RESPIRATION RATE: 16 BRPM | TEMPERATURE: 97.7 F | SYSTOLIC BLOOD PRESSURE: 120 MMHG | OXYGEN SATURATION: 93 % | BODY MASS INDEX: 31.57 KG/M2 | DIASTOLIC BLOOD PRESSURE: 72 MMHG

## 2021-01-01 VITALS
WEIGHT: 280 LBS | OXYGEN SATURATION: 93 % | DIASTOLIC BLOOD PRESSURE: 67 MMHG | TEMPERATURE: 97.6 F | HEART RATE: 75 BPM | RESPIRATION RATE: 20 BRPM | HEIGHT: 74 IN | SYSTOLIC BLOOD PRESSURE: 101 MMHG | BODY MASS INDEX: 35.94 KG/M2

## 2021-01-01 VITALS
TEMPERATURE: 96.8 F | BODY MASS INDEX: 29.15 KG/M2 | RESPIRATION RATE: 18 BRPM | OXYGEN SATURATION: 95 % | HEART RATE: 90 BPM | DIASTOLIC BLOOD PRESSURE: 81 MMHG | HEART RATE: 81 BPM | SYSTOLIC BLOOD PRESSURE: 125 MMHG | DIASTOLIC BLOOD PRESSURE: 80 MMHG | HEIGHT: 74 IN | SYSTOLIC BLOOD PRESSURE: 142 MMHG | TEMPERATURE: 94.7 F

## 2021-01-01 VITALS
DIASTOLIC BLOOD PRESSURE: 79 MMHG | SYSTOLIC BLOOD PRESSURE: 124 MMHG | TEMPERATURE: 97.3 F | HEART RATE: 73 BPM | RESPIRATION RATE: 18 BRPM | WEIGHT: 290.1 LBS | BODY MASS INDEX: 37.23 KG/M2 | HEIGHT: 74 IN | OXYGEN SATURATION: 92 %

## 2021-01-01 VITALS
RESPIRATION RATE: 16 BRPM | HEIGHT: 74 IN | TEMPERATURE: 97.4 F | RESPIRATION RATE: 20 BRPM | BODY MASS INDEX: 32.34 KG/M2 | DIASTOLIC BLOOD PRESSURE: 82 MMHG | TEMPERATURE: 98 F | HEIGHT: 74 IN | BODY MASS INDEX: 31.07 KG/M2 | SYSTOLIC BLOOD PRESSURE: 117 MMHG | DIASTOLIC BLOOD PRESSURE: 75 MMHG | WEIGHT: 252 LBS | HEART RATE: 82 BPM | SYSTOLIC BLOOD PRESSURE: 112 MMHG | HEART RATE: 79 BPM

## 2021-01-01 VITALS
DIASTOLIC BLOOD PRESSURE: 72 MMHG | SYSTOLIC BLOOD PRESSURE: 112 MMHG | TEMPERATURE: 97.7 F | RESPIRATION RATE: 18 BRPM | HEART RATE: 74 BPM | HEIGHT: 74 IN | BODY MASS INDEX: 32.35 KG/M2

## 2021-01-01 VITALS
TEMPERATURE: 97.7 F | DIASTOLIC BLOOD PRESSURE: 66 MMHG | DIASTOLIC BLOOD PRESSURE: 80 MMHG | BODY MASS INDEX: 34.65 KG/M2 | BODY MASS INDEX: 32.35 KG/M2 | WEIGHT: 270 LBS | RESPIRATION RATE: 18 BRPM | HEIGHT: 74 IN | HEART RATE: 98 BPM | RESPIRATION RATE: 18 BRPM | HEART RATE: 77 BPM | HEIGHT: 74 IN | TEMPERATURE: 97.3 F | SYSTOLIC BLOOD PRESSURE: 108 MMHG | SYSTOLIC BLOOD PRESSURE: 112 MMHG

## 2021-01-01 VITALS
BODY MASS INDEX: 31.06 KG/M2 | HEIGHT: 74 IN | DIASTOLIC BLOOD PRESSURE: 74 MMHG | TEMPERATURE: 98.2 F | SYSTOLIC BLOOD PRESSURE: 122 MMHG | HEART RATE: 98 BPM | WEIGHT: 242 LBS | OXYGEN SATURATION: 96 %

## 2021-01-01 VITALS
BODY MASS INDEX: 32.08 KG/M2 | SYSTOLIC BLOOD PRESSURE: 125 MMHG | TEMPERATURE: 98.5 F | RESPIRATION RATE: 18 BRPM | HEIGHT: 74 IN | DIASTOLIC BLOOD PRESSURE: 78 MMHG | HEART RATE: 98 BPM | OXYGEN SATURATION: 93 % | WEIGHT: 250 LBS

## 2021-01-01 VITALS
HEART RATE: 76 BPM | HEIGHT: 74 IN | BODY MASS INDEX: 32.35 KG/M2 | TEMPERATURE: 97.2 F | RESPIRATION RATE: 16 BRPM | DIASTOLIC BLOOD PRESSURE: 57 MMHG | SYSTOLIC BLOOD PRESSURE: 90 MMHG

## 2021-01-01 VITALS — DIASTOLIC BLOOD PRESSURE: 54 MMHG | SYSTOLIC BLOOD PRESSURE: 92 MMHG | OXYGEN SATURATION: 100 %

## 2021-01-01 VITALS
WEIGHT: 242 LBS | HEIGHT: 74 IN | RESPIRATION RATE: 18 BRPM | SYSTOLIC BLOOD PRESSURE: 118 MMHG | TEMPERATURE: 97 F | TEMPERATURE: 97.7 F | BODY MASS INDEX: 32.35 KG/M2 | RESPIRATION RATE: 17 BRPM | SYSTOLIC BLOOD PRESSURE: 126 MMHG | BODY MASS INDEX: 31.06 KG/M2 | DIASTOLIC BLOOD PRESSURE: 75 MMHG | HEIGHT: 74 IN | HEART RATE: 81 BPM | HEART RATE: 102 BPM | DIASTOLIC BLOOD PRESSURE: 72 MMHG

## 2021-01-01 VITALS — OXYGEN SATURATION: 96 %

## 2021-01-01 VITALS
DIASTOLIC BLOOD PRESSURE: 76 MMHG | HEART RATE: 100 BPM | SYSTOLIC BLOOD PRESSURE: 125 MMHG | RESPIRATION RATE: 18 BRPM | TEMPERATURE: 97.2 F

## 2021-01-01 VITALS
BODY MASS INDEX: 37.15 KG/M2 | RESPIRATION RATE: 18 BRPM | OXYGEN SATURATION: 95 % | DIASTOLIC BLOOD PRESSURE: 58 MMHG | HEIGHT: 74 IN | TEMPERATURE: 98.2 F | SYSTOLIC BLOOD PRESSURE: 131 MMHG | WEIGHT: 289.5 LBS | HEART RATE: 98 BPM

## 2021-01-01 VITALS
HEART RATE: 106 BPM | DIASTOLIC BLOOD PRESSURE: 83 MMHG | HEIGHT: 74 IN | RESPIRATION RATE: 20 BRPM | BODY MASS INDEX: 29.15 KG/M2 | TEMPERATURE: 97 F | SYSTOLIC BLOOD PRESSURE: 132 MMHG

## 2021-01-01 VITALS
HEIGHT: 74 IN | WEIGHT: 230 LBS | TEMPERATURE: 98.4 F | HEART RATE: 95 BPM | RESPIRATION RATE: 15 BRPM | OXYGEN SATURATION: 93 % | SYSTOLIC BLOOD PRESSURE: 143 MMHG | DIASTOLIC BLOOD PRESSURE: 85 MMHG | BODY MASS INDEX: 29.52 KG/M2

## 2021-01-01 VITALS
SYSTOLIC BLOOD PRESSURE: 127 MMHG | RESPIRATION RATE: 16 BRPM | HEIGHT: 74 IN | DIASTOLIC BLOOD PRESSURE: 76 MMHG | HEART RATE: 76 BPM | TEMPERATURE: 96.8 F | BODY MASS INDEX: 29.15 KG/M2

## 2021-01-01 VITALS
HEART RATE: 99 BPM | TEMPERATURE: 98 F | DIASTOLIC BLOOD PRESSURE: 78 MMHG | SYSTOLIC BLOOD PRESSURE: 135 MMHG | RESPIRATION RATE: 20 BRPM

## 2021-01-01 VITALS
HEIGHT: 74 IN | BODY MASS INDEX: 32.35 KG/M2 | DIASTOLIC BLOOD PRESSURE: 70 MMHG | TEMPERATURE: 97.2 F | HEART RATE: 78 BPM | SYSTOLIC BLOOD PRESSURE: 128 MMHG | RESPIRATION RATE: 18 BRPM

## 2021-01-01 VITALS
BODY MASS INDEX: 29.13 KG/M2 | TEMPERATURE: 97 F | WEIGHT: 227 LBS | RESPIRATION RATE: 18 BRPM | HEIGHT: 74 IN | HEART RATE: 87 BPM | SYSTOLIC BLOOD PRESSURE: 102 MMHG | DIASTOLIC BLOOD PRESSURE: 69 MMHG

## 2021-01-01 VITALS
BODY MASS INDEX: 38.8 KG/M2 | RESPIRATION RATE: 16 BRPM | WEIGHT: 302.3 LBS | SYSTOLIC BLOOD PRESSURE: 131 MMHG | TEMPERATURE: 96.4 F | HEART RATE: 95 BPM | OXYGEN SATURATION: 92 % | DIASTOLIC BLOOD PRESSURE: 79 MMHG | HEIGHT: 74 IN

## 2021-01-01 VITALS — WEIGHT: 250 LBS | BODY MASS INDEX: 32.1 KG/M2

## 2021-01-01 VITALS
BODY MASS INDEX: 29.13 KG/M2 | HEART RATE: 79 BPM | SYSTOLIC BLOOD PRESSURE: 126 MMHG | HEIGHT: 74 IN | WEIGHT: 227 LBS | TEMPERATURE: 97.6 F | DIASTOLIC BLOOD PRESSURE: 78 MMHG | RESPIRATION RATE: 16 BRPM

## 2021-01-01 DIAGNOSIS — L98.492 ULCER OF CHEST WALL WITH FAT LAYER EXPOSED (HCC): Primary | ICD-10-CM

## 2021-01-01 DIAGNOSIS — T83.9XXA COMPLICATION OF FOLEY CATHETER, INITIAL ENCOUNTER (HCC): Primary | ICD-10-CM

## 2021-01-01 DIAGNOSIS — I50.22 SYSTOLIC CHF, CHRONIC (HCC): ICD-10-CM

## 2021-01-01 DIAGNOSIS — L92.9 HYPERGRANULATION: ICD-10-CM

## 2021-01-01 DIAGNOSIS — E11.22 CKD STAGE 3 DUE TO TYPE 2 DIABETES MELLITUS (HCC): ICD-10-CM

## 2021-01-01 DIAGNOSIS — T81.31XA POSTOPERATIVE WOUND DEHISCENCE, INITIAL ENCOUNTER: ICD-10-CM

## 2021-01-01 DIAGNOSIS — A49.9 BACTERIAL URINARY TRACT INFECTION: ICD-10-CM

## 2021-01-01 DIAGNOSIS — E87.1 HYPONATREMIA: ICD-10-CM

## 2021-01-01 DIAGNOSIS — G82.21 PARAPLEGIA, COMPLETE (HCC): ICD-10-CM

## 2021-01-01 DIAGNOSIS — L02.212 ABSCESS OF BACK: ICD-10-CM

## 2021-01-01 DIAGNOSIS — R05.3 CHRONIC COUGH: ICD-10-CM

## 2021-01-01 DIAGNOSIS — R05.3 CHRONIC COUGH: Primary | ICD-10-CM

## 2021-01-01 DIAGNOSIS — K21.9 GASTROESOPHAGEAL REFLUX DISEASE WITHOUT ESOPHAGITIS: ICD-10-CM

## 2021-01-01 DIAGNOSIS — K80.50 CHOLEDOCHOLITHIASIS: Primary | ICD-10-CM

## 2021-01-01 DIAGNOSIS — Z79.4 TYPE 2 DIABETES MELLITUS WITH OTHER SKIN ULCER, WITH LONG-TERM CURRENT USE OF INSULIN (HCC): ICD-10-CM

## 2021-01-01 DIAGNOSIS — E11.622 TYPE 2 DIABETES MELLITUS WITH OTHER SKIN ULCER, WITH LONG-TERM CURRENT USE OF INSULIN (HCC): ICD-10-CM

## 2021-01-01 DIAGNOSIS — E83.42 HYPOMAGNESEMIA: ICD-10-CM

## 2021-01-01 DIAGNOSIS — J98.11 PULMONARY ATELECTASIS: ICD-10-CM

## 2021-01-01 DIAGNOSIS — I25.5 ISCHEMIC CARDIOMYOPATHY: Primary | ICD-10-CM

## 2021-01-01 DIAGNOSIS — N39.0 BACTERIAL URINARY TRACT INFECTION: ICD-10-CM

## 2021-01-01 DIAGNOSIS — J44.9 CHRONIC OBSTRUCTIVE PULMONARY DISEASE, UNSPECIFIED COPD TYPE (HCC): Primary | ICD-10-CM

## 2021-01-01 DIAGNOSIS — I25.10 CORONARY ARTERY DISEASE INVOLVING NATIVE CORONARY ARTERY OF NATIVE HEART WITHOUT ANGINA PECTORIS: ICD-10-CM

## 2021-01-01 DIAGNOSIS — I25.5 ISCHEMIC CARDIOMYOPATHY: Chronic | ICD-10-CM

## 2021-01-01 DIAGNOSIS — Z89.511 S/P BKA (BELOW KNEE AMPUTATION) UNILATERAL, RIGHT (HCC): ICD-10-CM

## 2021-01-01 DIAGNOSIS — E87.20 LACTIC ACIDOSIS: ICD-10-CM

## 2021-01-01 DIAGNOSIS — T81.31XA POSTOPERATIVE WOUND DEHISCENCE, INITIAL ENCOUNTER: Primary | ICD-10-CM

## 2021-01-01 DIAGNOSIS — J96.01 ACUTE HYPOXEMIC RESPIRATORY FAILURE (HCC): Primary | ICD-10-CM

## 2021-01-01 DIAGNOSIS — I48.0 PAROXYSMAL ATRIAL FIBRILLATION (HCC): ICD-10-CM

## 2021-01-01 DIAGNOSIS — I42.9 CARDIOMYOPATHY, UNSPECIFIED TYPE (HCC): ICD-10-CM

## 2021-01-01 DIAGNOSIS — L97.811 ULCER OF RIGHT KNEE, LIMITED TO BREAKDOWN OF SKIN (HCC): ICD-10-CM

## 2021-01-01 DIAGNOSIS — T81.31XD POSTOPERATIVE WOUND DEHISCENCE, SUBSEQUENT ENCOUNTER: ICD-10-CM

## 2021-01-01 DIAGNOSIS — E78.2 MIXED HYPERLIPIDEMIA: Chronic | ICD-10-CM

## 2021-01-01 DIAGNOSIS — R34 OLIGURIA: ICD-10-CM

## 2021-01-01 DIAGNOSIS — L97.821 ULCER OF LEFT KNEE, LIMITED TO BREAKDOWN OF SKIN (HCC): ICD-10-CM

## 2021-01-01 DIAGNOSIS — N39.0 ACUTE UTI: ICD-10-CM

## 2021-01-01 DIAGNOSIS — E11.51 TYPE 2 DIABETES MELLITUS WITH DIABETIC PERIPHERAL ANGIOPATHY WITHOUT GANGRENE, WITH LONG-TERM CURRENT USE OF INSULIN (HCC): ICD-10-CM

## 2021-01-01 DIAGNOSIS — L89.894 PRESSURE ULCER OF LEFT LEG, STAGE 4 (HCC): ICD-10-CM

## 2021-01-01 DIAGNOSIS — E66.01 SEVERE OBESITY (BMI 35.0-35.9 WITH COMORBIDITY) (HCC): ICD-10-CM

## 2021-01-01 DIAGNOSIS — A41.9 SEPTICEMIA (HCC): ICD-10-CM

## 2021-01-01 DIAGNOSIS — L98.492 ULCER OF CHEST WALL WITH FAT LAYER EXPOSED (HCC): ICD-10-CM

## 2021-01-01 DIAGNOSIS — E78.2 MIXED HYPERLIPIDEMIA: Primary | Chronic | ICD-10-CM

## 2021-01-01 DIAGNOSIS — I50.43 CHF (CONGESTIVE HEART FAILURE), NYHA CLASS I, ACUTE ON CHRONIC, COMBINED (HCC): Primary | ICD-10-CM

## 2021-01-01 DIAGNOSIS — N17.9 AKI (ACUTE KIDNEY INJURY) (HCC): Primary | ICD-10-CM

## 2021-01-01 DIAGNOSIS — I50.9 ACUTE ON CHRONIC CONGESTIVE HEART FAILURE, UNSPECIFIED HEART FAILURE TYPE (HCC): Primary | ICD-10-CM

## 2021-01-01 DIAGNOSIS — Z79.4 TYPE 2 DIABETES MELLITUS WITH DIABETIC PERIPHERAL ANGIOPATHY WITHOUT GANGRENE, WITH LONG-TERM CURRENT USE OF INSULIN (HCC): Primary | ICD-10-CM

## 2021-01-01 DIAGNOSIS — R34 DECREASED URINE OUTPUT: ICD-10-CM

## 2021-01-01 DIAGNOSIS — N18.30 CKD STAGE 3 DUE TO TYPE 2 DIABETES MELLITUS (HCC): ICD-10-CM

## 2021-01-01 DIAGNOSIS — R60.1 ANASARCA: ICD-10-CM

## 2021-01-01 DIAGNOSIS — J90 PLEURAL EFFUSION, BILATERAL: ICD-10-CM

## 2021-01-01 DIAGNOSIS — A41.9 ACUTE SEPSIS (HCC): Primary | ICD-10-CM

## 2021-01-01 DIAGNOSIS — T85.518A CHOLECYSTOSTOMY TUBE DYSFUNCTION, INITIAL ENCOUNTER: Primary | ICD-10-CM

## 2021-01-01 DIAGNOSIS — K83.09 CHOLANGITIS: Primary | ICD-10-CM

## 2021-01-01 DIAGNOSIS — N17.9 AKI (ACUTE KIDNEY INJURY) (HCC): ICD-10-CM

## 2021-01-01 DIAGNOSIS — K80.50 CHOLEDOCHOLITHIASIS: ICD-10-CM

## 2021-01-01 DIAGNOSIS — E11.51 TYPE 2 DIABETES MELLITUS WITH DIABETIC PERIPHERAL ANGIOPATHY WITHOUT GANGRENE, WITH LONG-TERM CURRENT USE OF INSULIN (HCC): Primary | ICD-10-CM

## 2021-01-01 DIAGNOSIS — R93.89 ABNORMAL CXR: ICD-10-CM

## 2021-01-01 DIAGNOSIS — Z79.4 TYPE 2 DIABETES MELLITUS WITH DIABETIC PERIPHERAL ANGIOPATHY WITHOUT GANGRENE, WITH LONG-TERM CURRENT USE OF INSULIN (HCC): ICD-10-CM

## 2021-01-01 LAB
A/G RATIO: 0.7 (ref 1.1–2.2)
A/G RATIO: 0.8 (ref 1.1–2.2)
A/G RATIO: 0.9 (ref 1.1–2.2)
A/G RATIO: 1.1 (ref 1.1–2.2)
ABO/RH: NORMAL
ALBUMIN SERPL-MCNC: 2.5 G/DL (ref 3.4–5)
ALBUMIN SERPL-MCNC: 2.6 G/DL (ref 3.4–5)
ALBUMIN SERPL-MCNC: 2.6 G/DL (ref 3.4–5)
ALBUMIN SERPL-MCNC: 2.7 G/DL (ref 3.4–5)
ALBUMIN SERPL-MCNC: 2.7 G/DL (ref 3.4–5)
ALBUMIN SERPL-MCNC: 2.8 G/DL (ref 3.4–5)
ALBUMIN SERPL-MCNC: 2.9 G/DL (ref 3.4–5)
ALBUMIN SERPL-MCNC: 3 G/DL (ref 3.4–5)
ALBUMIN SERPL-MCNC: 3.1 G/DL (ref 3.4–5)
ALBUMIN SERPL-MCNC: 3.2 G/DL (ref 3.4–5)
ALBUMIN SERPL-MCNC: 3.2 G/DL (ref 3.4–5)
ALBUMIN SERPL-MCNC: 3.3 G/DL (ref 3.4–5)
ALBUMIN SERPL-MCNC: 3.3 G/DL (ref 3.4–5)
ALBUMIN SERPL-MCNC: 3.4 G/DL (ref 3.4–5)
ALBUMIN SERPL-MCNC: 3.5 G/DL (ref 3.4–5)
ALBUMIN SERPL-MCNC: 3.5 G/DL (ref 3.4–5)
ALBUMIN SERPL-MCNC: 3.6 G/DL (ref 3.4–5)
ALBUMIN SERPL-MCNC: 3.7 G/DL (ref 3.4–5)
ALBUMIN SERPL-MCNC: 3.8 G/DL (ref 3.4–5)
ALP BLD-CCNC: 113 U/L (ref 40–129)
ALP BLD-CCNC: 122 U/L (ref 40–129)
ALP BLD-CCNC: 126 U/L (ref 40–129)
ALP BLD-CCNC: 129 U/L (ref 40–129)
ALP BLD-CCNC: 130 U/L (ref 40–129)
ALP BLD-CCNC: 135 U/L (ref 40–129)
ALP BLD-CCNC: 153 U/L (ref 40–129)
ALP BLD-CCNC: 169 U/L (ref 40–129)
ALP BLD-CCNC: 187 U/L (ref 40–129)
ALP BLD-CCNC: 194 U/L (ref 40–129)
ALP BLD-CCNC: 195 U/L (ref 40–129)
ALP BLD-CCNC: 205 U/L (ref 40–129)
ALP BLD-CCNC: 211 U/L (ref 40–129)
ALP BLD-CCNC: 211 U/L (ref 40–129)
ALP BLD-CCNC: 222 U/L (ref 40–129)
ALP BLD-CCNC: 241 U/L (ref 40–129)
ALP BLD-CCNC: 241 U/L (ref 40–129)
ALP BLD-CCNC: 243 U/L (ref 40–129)
ALP BLD-CCNC: 279 U/L (ref 40–129)
ALP BLD-CCNC: 290 U/L (ref 40–129)
ALP BLD-CCNC: 310 U/L (ref 40–129)
ALP BLD-CCNC: 315 U/L (ref 40–129)
ALP BLD-CCNC: 318 U/L (ref 40–129)
ALP BLD-CCNC: 348 U/L (ref 40–129)
ALP BLD-CCNC: 352 U/L (ref 40–129)
ALT SERPL-CCNC: 106 U/L (ref 10–40)
ALT SERPL-CCNC: 11 U/L (ref 10–40)
ALT SERPL-CCNC: 1246 U/L (ref 10–40)
ALT SERPL-CCNC: 13 U/L (ref 10–40)
ALT SERPL-CCNC: 13 U/L (ref 10–40)
ALT SERPL-CCNC: 14 U/L (ref 10–40)
ALT SERPL-CCNC: 14 U/L (ref 10–40)
ALT SERPL-CCNC: 15 U/L (ref 10–40)
ALT SERPL-CCNC: 155 U/L (ref 10–40)
ALT SERPL-CCNC: 17 U/L (ref 10–40)
ALT SERPL-CCNC: 21 U/L (ref 10–40)
ALT SERPL-CCNC: 237 U/L (ref 10–40)
ALT SERPL-CCNC: 27 U/L (ref 10–40)
ALT SERPL-CCNC: 307 U/L (ref 10–40)
ALT SERPL-CCNC: 32 U/L (ref 10–40)
ALT SERPL-CCNC: 370 U/L (ref 10–40)
ALT SERPL-CCNC: 406 U/L (ref 10–40)
ALT SERPL-CCNC: 42 U/L (ref 10–40)
ALT SERPL-CCNC: 58 U/L (ref 10–40)
ALT SERPL-CCNC: 60 U/L (ref 10–40)
ALT SERPL-CCNC: 604 U/L (ref 10–40)
ALT SERPL-CCNC: 7 U/L (ref 10–40)
ALT SERPL-CCNC: 9 U/L (ref 10–40)
ALT SERPL-CCNC: 927 U/L (ref 10–40)
ALT SERPL-CCNC: 984 U/L (ref 10–40)
ANION GAP SERPL CALCULATED.3IONS-SCNC: 10 MMOL/L (ref 3–16)
ANION GAP SERPL CALCULATED.3IONS-SCNC: 11 MMOL/L (ref 3–16)
ANION GAP SERPL CALCULATED.3IONS-SCNC: 12 MMOL/L (ref 3–16)
ANION GAP SERPL CALCULATED.3IONS-SCNC: 13 MMOL/L (ref 3–16)
ANION GAP SERPL CALCULATED.3IONS-SCNC: 14 MMOL/L (ref 3–16)
ANION GAP SERPL CALCULATED.3IONS-SCNC: 15 MMOL/L (ref 3–16)
ANION GAP SERPL CALCULATED.3IONS-SCNC: 16 MMOL/L (ref 3–16)
ANION GAP SERPL CALCULATED.3IONS-SCNC: 17 MMOL/L (ref 3–16)
ANION GAP SERPL CALCULATED.3IONS-SCNC: 18 MMOL/L (ref 3–16)
ANION GAP SERPL CALCULATED.3IONS-SCNC: 20 MMOL/L (ref 3–16)
ANION GAP SERPL CALCULATED.3IONS-SCNC: 24 MMOL/L (ref 3–16)
ANION GAP SERPL CALCULATED.3IONS-SCNC: 25 MMOL/L (ref 3–16)
ANION GAP SERPL CALCULATED.3IONS-SCNC: 6 MMOL/L (ref 3–16)
ANION GAP SERPL CALCULATED.3IONS-SCNC: 7 MMOL/L (ref 3–16)
ANION GAP SERPL CALCULATED.3IONS-SCNC: 8 MMOL/L (ref 3–16)
ANION GAP SERPL CALCULATED.3IONS-SCNC: 9 MMOL/L (ref 3–16)
ANISOCYTOSIS: ABNORMAL
ANTIBODY SCREEN: NORMAL
APTT: 106.1 SEC (ref 26.2–38.6)
APTT: 107.8 SEC (ref 26.2–38.6)
APTT: 108.7 SEC (ref 26.2–38.6)
APTT: 117.7 SEC (ref 26.2–38.6)
APTT: 174.2 SEC (ref 26.2–38.6)
APTT: 223.9 SEC (ref 26.2–38.6)
APTT: 37.4 SEC (ref 26.2–38.6)
APTT: 39.2 SEC (ref 26.2–38.6)
APTT: 40.9 SEC (ref 26.2–38.6)
APTT: 46.5 SEC (ref 26.2–38.6)
APTT: 49.1 SEC (ref 26.2–38.6)
APTT: 53.5 SEC (ref 26.2–38.6)
APTT: 54.6 SEC (ref 26.2–38.6)
APTT: 54.7 SEC (ref 26.2–38.6)
APTT: 65.4 SEC (ref 26.2–38.6)
APTT: 66.1 SEC (ref 26.2–38.6)
APTT: 68.9 SEC (ref 26.2–38.6)
APTT: 70.6 SEC (ref 26.2–38.6)
APTT: 72.9 SEC (ref 26.2–38.6)
APTT: 74.1 SEC (ref 26.2–38.6)
APTT: 77.4 SEC (ref 26.2–38.6)
APTT: 77.4 SEC (ref 26.2–38.6)
APTT: 79.1 SEC (ref 26.2–38.6)
APTT: 81.5 SEC (ref 26.2–38.6)
APTT: 82 SEC (ref 26.2–38.6)
APTT: 83.2 SEC (ref 26.2–38.6)
APTT: 83.7 SEC (ref 26.2–38.6)
APTT: 85.1 SEC (ref 26.2–38.6)
APTT: 88.3 SEC (ref 26.2–38.6)
APTT: 91.3 SEC (ref 26.2–38.6)
APTT: >248 SEC (ref 26.2–38.6)
APTT: >248 SEC (ref 26.2–38.6)
AST SERPL-CCNC: 14 U/L (ref 15–37)
AST SERPL-CCNC: 149 U/L (ref 15–37)
AST SERPL-CCNC: 17 U/L (ref 15–37)
AST SERPL-CCNC: 17 U/L (ref 15–37)
AST SERPL-CCNC: 18 U/L (ref 15–37)
AST SERPL-CCNC: 18 U/L (ref 15–37)
AST SERPL-CCNC: 19 U/L (ref 15–37)
AST SERPL-CCNC: 2125 U/L (ref 15–37)
AST SERPL-CCNC: 23 U/L (ref 15–37)
AST SERPL-CCNC: 25 U/L (ref 15–37)
AST SERPL-CCNC: 2505 U/L (ref 15–37)
AST SERPL-CCNC: 29 U/L (ref 15–37)
AST SERPL-CCNC: 30 U/L (ref 15–37)
AST SERPL-CCNC: 30 U/L (ref 15–37)
AST SERPL-CCNC: 32 U/L (ref 15–37)
AST SERPL-CCNC: 32 U/L (ref 15–37)
AST SERPL-CCNC: 36 U/L (ref 15–37)
AST SERPL-CCNC: 377 U/L (ref 15–37)
AST SERPL-CCNC: 41 U/L (ref 15–37)
AST SERPL-CCNC: 42 U/L (ref 15–37)
AST SERPL-CCNC: 59 U/L (ref 15–37)
AST SERPL-CCNC: 722 U/L (ref 15–37)
AST SERPL-CCNC: 91 U/L (ref 15–37)
AST SERPL-CCNC: 948 U/L (ref 15–37)
AST SERPL-CCNC: 991 U/L (ref 15–37)
ATYPICAL LYMPHOCYTE RELATIVE PERCENT: 2 % (ref 0–6)
BACTERIA: ABNORMAL /HPF
BACTERIA: ABNORMAL /HPF
BANDED NEUTROPHILS RELATIVE PERCENT: 1 % (ref 0–7)
BANDED NEUTROPHILS RELATIVE PERCENT: 2 % (ref 0–7)
BASE EXCESS ARTERIAL: -0.5 MMOL/L (ref -3–3)
BASE EXCESS ARTERIAL: -1.4 MMOL/L (ref -3–3)
BASE EXCESS ARTERIAL: -1.6 MMOL/L (ref -3–3)
BASE EXCESS ARTERIAL: -11 MMOL/L (ref -3–3)
BASE EXCESS ARTERIAL: -15.8 MMOL/L (ref -3–3)
BASE EXCESS ARTERIAL: -2.5 MMOL/L (ref -3–3)
BASE EXCESS VENOUS: -9.9 MMOL/L (ref -3–3)
BASE EXCESS VENOUS: 3.8 MMOL/L (ref -3–3)
BASOPHILS ABSOLUTE: 0 K/UL (ref 0–0.2)
BASOPHILS ABSOLUTE: 0.1 K/UL (ref 0–0.2)
BASOPHILS ABSOLUTE: 0.2 K/UL (ref 0–0.2)
BASOPHILS ABSOLUTE: 0.3 K/UL (ref 0–0.2)
BASOPHILS ABSOLUTE: 0.3 K/UL (ref 0–0.2)
BASOPHILS RELATIVE PERCENT: 0 %
BASOPHILS RELATIVE PERCENT: 0.2 %
BASOPHILS RELATIVE PERCENT: 0.4 %
BASOPHILS RELATIVE PERCENT: 0.4 %
BASOPHILS RELATIVE PERCENT: 0.6 %
BASOPHILS RELATIVE PERCENT: 0.6 %
BASOPHILS RELATIVE PERCENT: 0.7 %
BASOPHILS RELATIVE PERCENT: 0.8 %
BASOPHILS RELATIVE PERCENT: 0.9 %
BASOPHILS RELATIVE PERCENT: 1 %
BASOPHILS RELATIVE PERCENT: 1.1 %
BASOPHILS RELATIVE PERCENT: 1.1 %
BASOPHILS RELATIVE PERCENT: 1.2 %
BASOPHILS RELATIVE PERCENT: 1.5 %
BASOPHILS RELATIVE PERCENT: 1.6 %
BASOPHILS RELATIVE PERCENT: 1.8 %
BASOPHILS RELATIVE PERCENT: 1.9 %
BASOPHILS RELATIVE PERCENT: 2.1 %
BASOPHILS RELATIVE PERCENT: 2.3 %
BASOPHILS RELATIVE PERCENT: 2.3 %
BILIRUB SERPL-MCNC: 0.5 MG/DL (ref 0–1)
BILIRUB SERPL-MCNC: 0.7 MG/DL (ref 0–1)
BILIRUB SERPL-MCNC: 0.8 MG/DL (ref 0–1)
BILIRUB SERPL-MCNC: 0.8 MG/DL (ref 0–1)
BILIRUB SERPL-MCNC: 0.9 MG/DL (ref 0–1)
BILIRUB SERPL-MCNC: 3.3 MG/DL (ref 0–1)
BILIRUB SERPL-MCNC: 3.9 MG/DL (ref 0–1)
BILIRUB SERPL-MCNC: 3.9 MG/DL (ref 0–1)
BILIRUB SERPL-MCNC: 4.2 MG/DL (ref 0–1)
BILIRUB SERPL-MCNC: 4.3 MG/DL (ref 0–1)
BILIRUB SERPL-MCNC: 4.4 MG/DL (ref 0–1)
BILIRUB SERPL-MCNC: 4.4 MG/DL (ref 0–1)
BILIRUB SERPL-MCNC: 4.5 MG/DL (ref 0–1)
BILIRUB SERPL-MCNC: 5.2 MG/DL (ref 0–1)
BILIRUB SERPL-MCNC: 5.2 MG/DL (ref 0–1)
BILIRUB SERPL-MCNC: 5.4 MG/DL (ref 0–1)
BILIRUB SERPL-MCNC: 5.5 MG/DL (ref 0–1)
BILIRUB SERPL-MCNC: 5.8 MG/DL (ref 0–1)
BILIRUB SERPL-MCNC: 5.8 MG/DL (ref 0–1)
BILIRUB SERPL-MCNC: 6.6 MG/DL (ref 0–1)
BILIRUB SERPL-MCNC: 7.9 MG/DL (ref 0–1)
BILIRUB SERPL-MCNC: 8 MG/DL (ref 0–1)
BILIRUB SERPL-MCNC: 8.7 MG/DL (ref 0–1)
BILIRUBIN DIRECT: 0.4 MG/DL (ref 0–0.3)
BILIRUBIN DIRECT: 2.6 MG/DL (ref 0–0.3)
BILIRUBIN DIRECT: 3 MG/DL (ref 0–0.3)
BILIRUBIN DIRECT: 3.1 MG/DL (ref 0–0.3)
BILIRUBIN DIRECT: 3.1 MG/DL (ref 0–0.3)
BILIRUBIN DIRECT: 3.4 MG/DL (ref 0–0.3)
BILIRUBIN DIRECT: 3.4 MG/DL (ref 0–0.3)
BILIRUBIN DIRECT: 3.5 MG/DL (ref 0–0.3)
BILIRUBIN DIRECT: 3.6 MG/DL (ref 0–0.3)
BILIRUBIN DIRECT: 4.1 MG/DL (ref 0–0.3)
BILIRUBIN DIRECT: 4.1 MG/DL (ref 0–0.3)
BILIRUBIN DIRECT: 4.5 MG/DL (ref 0–0.3)
BILIRUBIN DIRECT: 4.8 MG/DL (ref 0–0.3)
BILIRUBIN DIRECT: 4.9 MG/DL (ref 0–0.3)
BILIRUBIN DIRECT: 5.6 MG/DL (ref 0–0.3)
BILIRUBIN DIRECT: 5.6 MG/DL (ref 0–0.3)
BILIRUBIN DIRECT: 6.9 MG/DL (ref 0–0.3)
BILIRUBIN DIRECT: 7.3 MG/DL (ref 0–0.3)
BILIRUBIN URINE: NEGATIVE
BILIRUBIN, INDIRECT: 0.3 MG/DL (ref 0–1)
BILIRUBIN, INDIRECT: 0.7 MG/DL (ref 0–1)
BILIRUBIN, INDIRECT: 0.8 MG/DL (ref 0–1)
BILIRUBIN, INDIRECT: 0.8 MG/DL (ref 0–1)
BILIRUBIN, INDIRECT: 0.9 MG/DL (ref 0–1)
BILIRUBIN, INDIRECT: 1 MG/DL (ref 0–1)
BILIRUBIN, INDIRECT: 1.1 MG/DL (ref 0–1)
BILIRUBIN, INDIRECT: 1.2 MG/DL (ref 0–1)
BILIRUBIN, INDIRECT: 1.4 MG/DL (ref 0–1)
BILIRUBIN, INDIRECT: 2.3 MG/DL (ref 0–1)
BLOOD CULTURE, ROUTINE: ABNORMAL
BLOOD CULTURE, ROUTINE: ABNORMAL
BLOOD CULTURE, ROUTINE: NORMAL
BLOOD, URINE: ABNORMAL
BODY FLUID CULTURE, STERILE: ABNORMAL
BODY FLUID CULTURE, STERILE: ABNORMAL
BUN BLDV-MCNC: 11 MG/DL (ref 7–20)
BUN BLDV-MCNC: 11 MG/DL (ref 7–20)
BUN BLDV-MCNC: 12 MG/DL (ref 7–20)
BUN BLDV-MCNC: 12 MG/DL (ref 7–20)
BUN BLDV-MCNC: 13 MG/DL (ref 7–20)
BUN BLDV-MCNC: 14 MG/DL (ref 7–20)
BUN BLDV-MCNC: 15 MG/DL (ref 7–20)
BUN BLDV-MCNC: 18 MG/DL (ref 7–20)
BUN BLDV-MCNC: 19 MG/DL (ref 7–20)
BUN BLDV-MCNC: 21 MG/DL (ref 7–20)
BUN BLDV-MCNC: 21 MG/DL (ref 7–20)
BUN BLDV-MCNC: 22 MG/DL (ref 7–20)
BUN BLDV-MCNC: 24 MG/DL (ref 7–20)
BUN BLDV-MCNC: 27 MG/DL (ref 7–20)
BUN BLDV-MCNC: 29 MG/DL (ref 7–20)
BUN BLDV-MCNC: 32 MG/DL (ref 7–20)
BUN BLDV-MCNC: 38 MG/DL (ref 7–20)
BUN BLDV-MCNC: 48 MG/DL (ref 7–20)
BUN BLDV-MCNC: 66 MG/DL (ref 7–20)
BUN BLDV-MCNC: 73 MG/DL (ref 7–20)
BUN BLDV-MCNC: 75 MG/DL (ref 7–20)
BUN BLDV-MCNC: 75 MG/DL (ref 7–20)
BUN BLDV-MCNC: 76 MG/DL (ref 7–20)
BUN BLDV-MCNC: 77 MG/DL (ref 7–20)
BUN BLDV-MCNC: 80 MG/DL (ref 7–20)
BUN BLDV-MCNC: 82 MG/DL (ref 7–20)
BUN BLDV-MCNC: 83 MG/DL (ref 7–20)
BUN BLDV-MCNC: 85 MG/DL (ref 7–20)
BUN BLDV-MCNC: 87 MG/DL (ref 7–20)
BUN BLDV-MCNC: 88 MG/DL (ref 7–20)
BUN BLDV-MCNC: 89 MG/DL (ref 7–20)
BUN BLDV-MCNC: 90 MG/DL (ref 7–20)
BUN BLDV-MCNC: 91 MG/DL (ref 7–20)
BUN BLDV-MCNC: 91 MG/DL (ref 7–20)
BUN BLDV-MCNC: 94 MG/DL (ref 7–20)
BUN BLDV-MCNC: 96 MG/DL (ref 7–20)
BUN BLDV-MCNC: 96 MG/DL (ref 7–20)
BUN BLDV-MCNC: 97 MG/DL (ref 7–20)
BUN BLDV-MCNC: 98 MG/DL (ref 7–20)
CALCIUM IONIZED: 0.93 MMOL/L (ref 1.12–1.32)
CALCIUM IONIZED: 0.94 MMOL/L (ref 1.12–1.32)
CALCIUM IONIZED: 0.94 MMOL/L (ref 1.12–1.32)
CALCIUM IONIZED: 0.95 MMOL/L (ref 1.12–1.32)
CALCIUM IONIZED: 0.97 MMOL/L (ref 1.12–1.32)
CALCIUM IONIZED: 0.98 MMOL/L (ref 1.12–1.32)
CALCIUM IONIZED: 1.01 MMOL/L (ref 1.12–1.32)
CALCIUM IONIZED: 1.02 MMOL/L (ref 1.12–1.32)
CALCIUM IONIZED: 1.03 MMOL/L (ref 1.12–1.32)
CALCIUM IONIZED: 1.03 MMOL/L (ref 1.12–1.32)
CALCIUM IONIZED: 1.04 MMOL/L (ref 1.12–1.32)
CALCIUM IONIZED: 1.05 MMOL/L (ref 1.12–1.32)
CALCIUM IONIZED: 1.06 MMOL/L (ref 1.12–1.32)
CALCIUM IONIZED: 1.07 MMOL/L (ref 1.12–1.32)
CALCIUM IONIZED: 1.08 MMOL/L (ref 1.12–1.32)
CALCIUM IONIZED: 1.09 MMOL/L (ref 1.12–1.32)
CALCIUM SERPL-MCNC: 7.4 MG/DL (ref 8.3–10.6)
CALCIUM SERPL-MCNC: 7.6 MG/DL (ref 8.3–10.6)
CALCIUM SERPL-MCNC: 7.6 MG/DL (ref 8.3–10.6)
CALCIUM SERPL-MCNC: 7.7 MG/DL (ref 8.3–10.6)
CALCIUM SERPL-MCNC: 7.8 MG/DL (ref 8.3–10.6)
CALCIUM SERPL-MCNC: 7.9 MG/DL (ref 8.3–10.6)
CALCIUM SERPL-MCNC: 8 MG/DL (ref 8.3–10.6)
CALCIUM SERPL-MCNC: 8.1 MG/DL (ref 8.3–10.6)
CALCIUM SERPL-MCNC: 8.2 MG/DL (ref 8.3–10.6)
CALCIUM SERPL-MCNC: 8.2 MG/DL (ref 8.3–10.6)
CALCIUM SERPL-MCNC: 8.3 MG/DL (ref 8.3–10.6)
CALCIUM SERPL-MCNC: 8.4 MG/DL (ref 8.3–10.6)
CALCIUM SERPL-MCNC: 8.5 MG/DL (ref 8.3–10.6)
CALCIUM SERPL-MCNC: 8.6 MG/DL (ref 8.3–10.6)
CALCIUM SERPL-MCNC: 8.7 MG/DL (ref 8.3–10.6)
CALCIUM SERPL-MCNC: 8.8 MG/DL (ref 8.3–10.6)
CALCIUM SERPL-MCNC: 8.9 MG/DL (ref 8.3–10.6)
CALCIUM SERPL-MCNC: 8.9 MG/DL (ref 8.3–10.6)
CALCIUM SERPL-MCNC: 9 MG/DL (ref 8.3–10.6)
CALCIUM SERPL-MCNC: 9.4 MG/DL (ref 8.3–10.6)
CARBOXYHEMOGLOBIN ARTERIAL: 0.9 % (ref 0–1.5)
CARBOXYHEMOGLOBIN ARTERIAL: 1 % (ref 0–1.5)
CARBOXYHEMOGLOBIN ARTERIAL: 1.2 % (ref 0–1.5)
CARBOXYHEMOGLOBIN ARTERIAL: 1.3 % (ref 0–1.5)
CARBOXYHEMOGLOBIN ARTERIAL: 1.4 % (ref 0–1.5)
CARBOXYHEMOGLOBIN ARTERIAL: 1.5 % (ref 0–1.5)
CARBOXYHEMOGLOBIN: 1.9 % (ref 0–1.5)
CARBOXYHEMOGLOBIN: 2.7 % (ref 0–1.5)
CHLORIDE BLD-SCNC: 100 MMOL/L (ref 99–110)
CHLORIDE BLD-SCNC: 101 MMOL/L (ref 99–110)
CHLORIDE BLD-SCNC: 101 MMOL/L (ref 99–110)
CHLORIDE BLD-SCNC: 102 MMOL/L (ref 99–110)
CHLORIDE BLD-SCNC: 107 MMOL/L (ref 99–110)
CHLORIDE BLD-SCNC: 81 MMOL/L (ref 99–110)
CHLORIDE BLD-SCNC: 83 MMOL/L (ref 99–110)
CHLORIDE BLD-SCNC: 85 MMOL/L (ref 99–110)
CHLORIDE BLD-SCNC: 86 MMOL/L (ref 99–110)
CHLORIDE BLD-SCNC: 87 MMOL/L (ref 99–110)
CHLORIDE BLD-SCNC: 88 MMOL/L (ref 99–110)
CHLORIDE BLD-SCNC: 89 MMOL/L (ref 99–110)
CHLORIDE BLD-SCNC: 90 MMOL/L (ref 99–110)
CHLORIDE BLD-SCNC: 91 MMOL/L (ref 99–110)
CHLORIDE BLD-SCNC: 92 MMOL/L (ref 99–110)
CHLORIDE BLD-SCNC: 93 MMOL/L (ref 99–110)
CHLORIDE BLD-SCNC: 94 MMOL/L (ref 99–110)
CHLORIDE BLD-SCNC: 95 MMOL/L (ref 99–110)
CHLORIDE BLD-SCNC: 96 MMOL/L (ref 99–110)
CHLORIDE BLD-SCNC: 96 MMOL/L (ref 99–110)
CHLORIDE BLD-SCNC: 97 MMOL/L (ref 99–110)
CHLORIDE BLD-SCNC: 98 MMOL/L (ref 99–110)
CHLORIDE BLD-SCNC: 99 MMOL/L (ref 99–110)
CHLORIDE URINE RANDOM: <20 MMOL/L
CHP ED QC CHECK: YES
CLARITY: ABNORMAL
CLARITY: CLEAR
CLARITY: CLEAR
CO2: 13 MMOL/L (ref 21–32)
CO2: 15 MMOL/L (ref 21–32)
CO2: 17 MMOL/L (ref 21–32)
CO2: 18 MMOL/L (ref 21–32)
CO2: 19 MMOL/L (ref 21–32)
CO2: 20 MMOL/L (ref 21–32)
CO2: 20 MMOL/L (ref 21–32)
CO2: 21 MMOL/L (ref 21–32)
CO2: 22 MMOL/L (ref 21–32)
CO2: 23 MMOL/L (ref 21–32)
CO2: 24 MMOL/L (ref 21–32)
CO2: 25 MMOL/L (ref 21–32)
CO2: 26 MMOL/L (ref 21–32)
CO2: 27 MMOL/L (ref 21–32)
CO2: 28 MMOL/L (ref 21–32)
CO2: 30 MMOL/L (ref 21–32)
CO2: 32 MMOL/L (ref 21–32)
CO2: 33 MMOL/L (ref 21–32)
COLOR: ABNORMAL
COLOR: YELLOW
COMMENT UA: ABNORMAL
COMMENT UA: ABNORMAL
CREAT SERPL-MCNC: 0.6 MG/DL (ref 0.9–1.3)
CREAT SERPL-MCNC: 0.7 MG/DL (ref 0.9–1.3)
CREAT SERPL-MCNC: 0.8 MG/DL (ref 0.9–1.3)
CREAT SERPL-MCNC: 0.8 MG/DL (ref 0.9–1.3)
CREAT SERPL-MCNC: 0.9 MG/DL (ref 0.9–1.3)
CREAT SERPL-MCNC: 1 MG/DL (ref 0.9–1.3)
CREAT SERPL-MCNC: 1 MG/DL (ref 0.9–1.3)
CREAT SERPL-MCNC: 1.1 MG/DL (ref 0.9–1.3)
CREAT SERPL-MCNC: 1.2 MG/DL (ref 0.9–1.3)
CREAT SERPL-MCNC: 1.3 MG/DL (ref 0.9–1.3)
CREAT SERPL-MCNC: 1.4 MG/DL (ref 0.9–1.3)
CREAT SERPL-MCNC: 1.4 MG/DL (ref 0.9–1.3)
CREAT SERPL-MCNC: 1.5 MG/DL (ref 0.9–1.3)
CREAT SERPL-MCNC: 1.5 MG/DL (ref 0.9–1.3)
CREAT SERPL-MCNC: 1.6 MG/DL (ref 0.9–1.3)
CREAT SERPL-MCNC: 1.6 MG/DL (ref 0.9–1.3)
CREAT SERPL-MCNC: 1.8 MG/DL (ref 0.9–1.3)
CREAT SERPL-MCNC: 1.9 MG/DL (ref 0.9–1.3)
CREAT SERPL-MCNC: 2 MG/DL (ref 0.9–1.3)
CREAT SERPL-MCNC: 2.2 MG/DL (ref 0.9–1.3)
CREAT SERPL-MCNC: 2.3 MG/DL (ref 0.9–1.3)
CREAT SERPL-MCNC: <0.5 MG/DL (ref 0.9–1.3)
CREATININE URINE: 82.4 MG/DL (ref 39–259)
CULTURE, BLOOD 2: ABNORMAL
CULTURE, BLOOD 2: NORMAL
DLCO %PRED: 23 %
DLCO PRED: NORMAL
DLCO/VA %PRED: NORMAL
DLCO/VA PRED: NORMAL
DLCO/VA: NORMAL
DLCO: NORMAL
EKG ATRIAL RATE: 100 BPM
EKG ATRIAL RATE: 102 BPM
EKG ATRIAL RATE: 106 BPM
EKG ATRIAL RATE: 59 BPM
EKG ATRIAL RATE: 80 BPM
EKG ATRIAL RATE: 93 BPM
EKG DIAGNOSIS: NORMAL
EKG P AXIS: 31 DEGREES
EKG P AXIS: 58 DEGREES
EKG P AXIS: 63 DEGREES
EKG P AXIS: 69 DEGREES
EKG P AXIS: 70 DEGREES
EKG P AXIS: 71 DEGREES
EKG P-R INTERVAL: 180 MS
EKG P-R INTERVAL: 180 MS
EKG P-R INTERVAL: 198 MS
EKG P-R INTERVAL: 202 MS
EKG P-R INTERVAL: 212 MS
EKG P-R INTERVAL: 214 MS
EKG Q-T INTERVAL: 316 MS
EKG Q-T INTERVAL: 362 MS
EKG Q-T INTERVAL: 368 MS
EKG Q-T INTERVAL: 374 MS
EKG Q-T INTERVAL: 378 MS
EKG Q-T INTERVAL: 446 MS
EKG QRS DURATION: 84 MS
EKG QRS DURATION: 84 MS
EKG QRS DURATION: 88 MS
EKG QRS DURATION: 88 MS
EKG QRS DURATION: 92 MS
EKG QRS DURATION: 94 MS
EKG QTC CALCULATION (BAZETT): 419 MS
EKG QTC CALCULATION (BAZETT): 435 MS
EKG QTC CALCULATION (BAZETT): 441 MS
EKG QTC CALCULATION (BAZETT): 457 MS
EKG QTC CALCULATION (BAZETT): 471 MS
EKG QTC CALCULATION (BAZETT): 482 MS
EKG R AXIS: -19 DEGREES
EKG R AXIS: -23 DEGREES
EKG R AXIS: -28 DEGREES
EKG R AXIS: -33 DEGREES
EKG R AXIS: -37 DEGREES
EKG R AXIS: -38 DEGREES
EKG T AXIS: 108 DEGREES
EKG T AXIS: 59 DEGREES
EKG T AXIS: 67 DEGREES
EKG T AXIS: 68 DEGREES
EKG T AXIS: 72 DEGREES
EKG T AXIS: 94 DEGREES
EKG VENTRICULAR RATE: 100 BPM
EKG VENTRICULAR RATE: 102 BPM
EKG VENTRICULAR RATE: 106 BPM
EKG VENTRICULAR RATE: 59 BPM
EKG VENTRICULAR RATE: 80 BPM
EKG VENTRICULAR RATE: 93 BPM
EOSINOPHIL,URINE: NORMAL
EOSINOPHILS ABSOLUTE: 0 K/UL (ref 0–0.6)
EOSINOPHILS ABSOLUTE: 0.1 K/UL (ref 0–0.6)
EOSINOPHILS ABSOLUTE: 0.2 K/UL (ref 0–0.6)
EOSINOPHILS ABSOLUTE: 0.3 K/UL (ref 0–0.6)
EOSINOPHILS ABSOLUTE: 0.4 K/UL (ref 0–0.6)
EOSINOPHILS RELATIVE PERCENT: 0 %
EOSINOPHILS RELATIVE PERCENT: 0.2 %
EOSINOPHILS RELATIVE PERCENT: 0.4 %
EOSINOPHILS RELATIVE PERCENT: 0.5 %
EOSINOPHILS RELATIVE PERCENT: 0.5 %
EOSINOPHILS RELATIVE PERCENT: 0.6 %
EOSINOPHILS RELATIVE PERCENT: 0.8 %
EOSINOPHILS RELATIVE PERCENT: 0.9 %
EOSINOPHILS RELATIVE PERCENT: 0.9 %
EOSINOPHILS RELATIVE PERCENT: 1.1 %
EOSINOPHILS RELATIVE PERCENT: 1.3 %
EOSINOPHILS RELATIVE PERCENT: 1.4 %
EOSINOPHILS RELATIVE PERCENT: 1.6 %
EOSINOPHILS RELATIVE PERCENT: 1.7 %
EOSINOPHILS RELATIVE PERCENT: 1.8 %
EOSINOPHILS RELATIVE PERCENT: 2 %
EOSINOPHILS RELATIVE PERCENT: 2 %
EOSINOPHILS RELATIVE PERCENT: 3 %
EOSINOPHILS RELATIVE PERCENT: 3.1 %
EOSINOPHILS RELATIVE PERCENT: 3.2 %
EOSINOPHILS RELATIVE PERCENT: 4 %
EOSINOPHILS RELATIVE PERCENT: 4 %
EPITHELIAL CELLS, UA: ABNORMAL /HPF (ref 0–5)
EPITHELIAL CELLS, UA: ABNORMAL /HPF (ref 0–5)
ESTIMATED AVERAGE GLUCOSE: 134.1 MG/DL
ESTIMATED AVERAGE GLUCOSE: 137 MG/DL
ESTIMATED AVERAGE GLUCOSE: 171.4 MG/DL
EXPIRATORY TIME-POST: NORMAL
EXPIRATORY TIME: NORMAL
FEF 25-75% %CHNG: NORMAL
FEF 25-75% %PRED-POST: NORMAL
FEF 25-75% %PRED-PRE: NORMAL
FEF 25-75% PRED: NORMAL
FEF 25-75%-POST: NORMAL
FEF 25-75%-PRE: NORMAL
FEV1 %PRED-POST: 23 %
FEV1 %PRED-PRE: 25 %
FEV1 PRED: NORMAL
FEV1-POST: NORMAL
FEV1-PRE: NORMAL
FEV1/FVC %PRED-POST: NORMAL
FEV1/FVC %PRED-PRE: NORMAL
FEV1/FVC PRED: NORMAL
FEV1/FVC-POST: 63 %
FEV1/FVC-PRE: 68 %
FVC %PRED-POST: NORMAL
FVC %PRED-PRE: NORMAL
FVC PRED: NORMAL
FVC-POST: NORMAL
FVC-PRE: NORMAL
GAW %PRED: NORMAL
GAW PRED: NORMAL
GAW: NORMAL
GFR AFRICAN AMERICAN: 36
GFR AFRICAN AMERICAN: 38
GFR AFRICAN AMERICAN: 42
GFR AFRICAN AMERICAN: 45
GFR AFRICAN AMERICAN: 48
GFR AFRICAN AMERICAN: 55
GFR AFRICAN AMERICAN: 55
GFR AFRICAN AMERICAN: 59
GFR AFRICAN AMERICAN: 59
GFR AFRICAN AMERICAN: >60
GFR NON-AFRICAN AMERICAN: 30
GFR NON-AFRICAN AMERICAN: 31
GFR NON-AFRICAN AMERICAN: 35
GFR NON-AFRICAN AMERICAN: 37
GFR NON-AFRICAN AMERICAN: 40
GFR NON-AFRICAN AMERICAN: 45
GFR NON-AFRICAN AMERICAN: 45
GFR NON-AFRICAN AMERICAN: 49
GFR NON-AFRICAN AMERICAN: 49
GFR NON-AFRICAN AMERICAN: 53
GFR NON-AFRICAN AMERICAN: 53
GFR NON-AFRICAN AMERICAN: 58
GFR NON-AFRICAN AMERICAN: >60
GLOBULIN: 3.3 G/DL
GLOBULIN: 3.4 G/DL
GLOBULIN: 3.5 G/DL
GLOBULIN: 3.5 G/DL
GLOBULIN: 3.9 G/DL
GLOBULIN: 4.3 G/DL
GLUCOSE BLD-MCNC: 100 MG/DL (ref 70–99)
GLUCOSE BLD-MCNC: 101 MG/DL (ref 70–99)
GLUCOSE BLD-MCNC: 101 MG/DL (ref 70–99)
GLUCOSE BLD-MCNC: 102 MG/DL (ref 70–99)
GLUCOSE BLD-MCNC: 105 MG/DL (ref 70–99)
GLUCOSE BLD-MCNC: 106 MG/DL (ref 70–99)
GLUCOSE BLD-MCNC: 106 MG/DL (ref 70–99)
GLUCOSE BLD-MCNC: 108 MG/DL (ref 70–99)
GLUCOSE BLD-MCNC: 109 MG/DL (ref 70–99)
GLUCOSE BLD-MCNC: 110 MG/DL (ref 70–99)
GLUCOSE BLD-MCNC: 111 MG/DL (ref 70–99)
GLUCOSE BLD-MCNC: 111 MG/DL (ref 70–99)
GLUCOSE BLD-MCNC: 113 MG/DL (ref 70–99)
GLUCOSE BLD-MCNC: 114 MG/DL (ref 70–99)
GLUCOSE BLD-MCNC: 114 MG/DL (ref 70–99)
GLUCOSE BLD-MCNC: 115 MG/DL (ref 70–99)
GLUCOSE BLD-MCNC: 116 MG/DL (ref 70–99)
GLUCOSE BLD-MCNC: 120 MG/DL (ref 70–99)
GLUCOSE BLD-MCNC: 120 MG/DL (ref 70–99)
GLUCOSE BLD-MCNC: 121 MG/DL (ref 70–99)
GLUCOSE BLD-MCNC: 122 MG/DL (ref 70–99)
GLUCOSE BLD-MCNC: 123 MG/DL (ref 70–99)
GLUCOSE BLD-MCNC: 124 MG/DL (ref 70–99)
GLUCOSE BLD-MCNC: 127 MG/DL (ref 70–99)
GLUCOSE BLD-MCNC: 128 MG/DL (ref 70–99)
GLUCOSE BLD-MCNC: 129 MG/DL (ref 70–99)
GLUCOSE BLD-MCNC: 131 MG/DL (ref 70–99)
GLUCOSE BLD-MCNC: 133 MG/DL (ref 70–99)
GLUCOSE BLD-MCNC: 133 MG/DL (ref 70–99)
GLUCOSE BLD-MCNC: 134 MG/DL (ref 70–99)
GLUCOSE BLD-MCNC: 136 MG/DL (ref 70–99)
GLUCOSE BLD-MCNC: 136 MG/DL (ref 70–99)
GLUCOSE BLD-MCNC: 137 MG/DL (ref 70–99)
GLUCOSE BLD-MCNC: 139 MG/DL (ref 70–99)
GLUCOSE BLD-MCNC: 139 MG/DL (ref 70–99)
GLUCOSE BLD-MCNC: 143 MG/DL (ref 70–99)
GLUCOSE BLD-MCNC: 145 MG/DL (ref 70–99)
GLUCOSE BLD-MCNC: 146 MG/DL (ref 70–99)
GLUCOSE BLD-MCNC: 147 MG/DL (ref 70–99)
GLUCOSE BLD-MCNC: 148 MG/DL (ref 70–99)
GLUCOSE BLD-MCNC: 148 MG/DL (ref 70–99)
GLUCOSE BLD-MCNC: 149 MG/DL (ref 70–99)
GLUCOSE BLD-MCNC: 151 MG/DL (ref 70–99)
GLUCOSE BLD-MCNC: 153 MG/DL (ref 70–99)
GLUCOSE BLD-MCNC: 154 MG/DL (ref 70–99)
GLUCOSE BLD-MCNC: 154 MG/DL (ref 70–99)
GLUCOSE BLD-MCNC: 156 MG/DL (ref 70–99)
GLUCOSE BLD-MCNC: 156 MG/DL (ref 70–99)
GLUCOSE BLD-MCNC: 157 MG/DL (ref 70–99)
GLUCOSE BLD-MCNC: 158 MG/DL (ref 70–99)
GLUCOSE BLD-MCNC: 159 MG/DL (ref 70–99)
GLUCOSE BLD-MCNC: 161 MG/DL (ref 70–99)
GLUCOSE BLD-MCNC: 162 MG/DL (ref 70–99)
GLUCOSE BLD-MCNC: 163 MG/DL (ref 70–99)
GLUCOSE BLD-MCNC: 163 MG/DL (ref 70–99)
GLUCOSE BLD-MCNC: 164 MG/DL (ref 70–99)
GLUCOSE BLD-MCNC: 168 MG/DL (ref 70–99)
GLUCOSE BLD-MCNC: 169 MG/DL (ref 70–99)
GLUCOSE BLD-MCNC: 170 MG/DL (ref 70–99)
GLUCOSE BLD-MCNC: 170 MG/DL (ref 70–99)
GLUCOSE BLD-MCNC: 171 MG/DL (ref 70–99)
GLUCOSE BLD-MCNC: 172 MG/DL (ref 70–99)
GLUCOSE BLD-MCNC: 172 MG/DL (ref 70–99)
GLUCOSE BLD-MCNC: 174 MG/DL (ref 70–99)
GLUCOSE BLD-MCNC: 175 MG/DL (ref 70–99)
GLUCOSE BLD-MCNC: 176 MG/DL (ref 70–99)
GLUCOSE BLD-MCNC: 178 MG/DL (ref 70–99)
GLUCOSE BLD-MCNC: 179 MG/DL (ref 70–99)
GLUCOSE BLD-MCNC: 179 MG/DL (ref 70–99)
GLUCOSE BLD-MCNC: 180 MG/DL (ref 70–99)
GLUCOSE BLD-MCNC: 181 MG/DL (ref 70–99)
GLUCOSE BLD-MCNC: 183 MG/DL (ref 70–99)
GLUCOSE BLD-MCNC: 185 MG/DL (ref 70–99)
GLUCOSE BLD-MCNC: 186 MG/DL (ref 70–99)
GLUCOSE BLD-MCNC: 187 MG/DL (ref 70–99)
GLUCOSE BLD-MCNC: 187 MG/DL (ref 70–99)
GLUCOSE BLD-MCNC: 190 MG/DL (ref 70–99)
GLUCOSE BLD-MCNC: 191 MG/DL (ref 70–99)
GLUCOSE BLD-MCNC: 192 MG/DL (ref 70–99)
GLUCOSE BLD-MCNC: 193 MG/DL (ref 70–99)
GLUCOSE BLD-MCNC: 194 MG/DL (ref 70–99)
GLUCOSE BLD-MCNC: 196 MG/DL (ref 70–99)
GLUCOSE BLD-MCNC: 198 MG/DL (ref 70–99)
GLUCOSE BLD-MCNC: 198 MG/DL (ref 70–99)
GLUCOSE BLD-MCNC: 199 MG/DL (ref 70–99)
GLUCOSE BLD-MCNC: 202 MG/DL (ref 70–99)
GLUCOSE BLD-MCNC: 203 MG/DL (ref 70–99)
GLUCOSE BLD-MCNC: 204 MG/DL (ref 70–99)
GLUCOSE BLD-MCNC: 208 MG/DL (ref 70–99)
GLUCOSE BLD-MCNC: 208 MG/DL (ref 70–99)
GLUCOSE BLD-MCNC: 211 MG/DL (ref 70–99)
GLUCOSE BLD-MCNC: 217 MG/DL (ref 70–99)
GLUCOSE BLD-MCNC: 217 MG/DL (ref 70–99)
GLUCOSE BLD-MCNC: 219 MG/DL (ref 70–99)
GLUCOSE BLD-MCNC: 221 MG/DL (ref 70–99)
GLUCOSE BLD-MCNC: 222 MG/DL (ref 70–99)
GLUCOSE BLD-MCNC: 222 MG/DL (ref 70–99)
GLUCOSE BLD-MCNC: 224 MG/DL (ref 70–99)
GLUCOSE BLD-MCNC: 225 MG/DL (ref 70–99)
GLUCOSE BLD-MCNC: 226 MG/DL (ref 70–99)
GLUCOSE BLD-MCNC: 228 MG/DL (ref 70–99)
GLUCOSE BLD-MCNC: 229 MG/DL (ref 70–99)
GLUCOSE BLD-MCNC: 231 MG/DL (ref 70–99)
GLUCOSE BLD-MCNC: 234 MG/DL (ref 70–99)
GLUCOSE BLD-MCNC: 240 MG/DL (ref 70–99)
GLUCOSE BLD-MCNC: 240 MG/DL (ref 70–99)
GLUCOSE BLD-MCNC: 241 MG/DL (ref 70–99)
GLUCOSE BLD-MCNC: 247 MG/DL (ref 70–99)
GLUCOSE BLD-MCNC: 247 MG/DL (ref 70–99)
GLUCOSE BLD-MCNC: 250 MG/DL (ref 70–99)
GLUCOSE BLD-MCNC: 253 MG/DL (ref 70–99)
GLUCOSE BLD-MCNC: 256 MG/DL (ref 70–99)
GLUCOSE BLD-MCNC: 257 MG/DL (ref 70–99)
GLUCOSE BLD-MCNC: 258 MG/DL (ref 70–99)
GLUCOSE BLD-MCNC: 263 MG/DL (ref 70–99)
GLUCOSE BLD-MCNC: 263 MG/DL (ref 70–99)
GLUCOSE BLD-MCNC: 264 MG/DL (ref 70–99)
GLUCOSE BLD-MCNC: 266 MG/DL (ref 70–99)
GLUCOSE BLD-MCNC: 266 MG/DL (ref 70–99)
GLUCOSE BLD-MCNC: 267 MG/DL (ref 70–99)
GLUCOSE BLD-MCNC: 269 MG/DL (ref 70–99)
GLUCOSE BLD-MCNC: 271 MG/DL (ref 70–99)
GLUCOSE BLD-MCNC: 275 MG/DL (ref 70–99)
GLUCOSE BLD-MCNC: 277 MG/DL (ref 70–99)
GLUCOSE BLD-MCNC: 279 MG/DL (ref 70–99)
GLUCOSE BLD-MCNC: 288 MG/DL (ref 70–99)
GLUCOSE BLD-MCNC: 288 MG/DL (ref 70–99)
GLUCOSE BLD-MCNC: 290 MG/DL (ref 70–99)
GLUCOSE BLD-MCNC: 290 MG/DL (ref 70–99)
GLUCOSE BLD-MCNC: 291 MG/DL (ref 70–99)
GLUCOSE BLD-MCNC: 295 MG/DL (ref 70–99)
GLUCOSE BLD-MCNC: 303 MG/DL (ref 70–99)
GLUCOSE BLD-MCNC: 313 MG/DL (ref 70–99)
GLUCOSE BLD-MCNC: 323 MG/DL (ref 70–99)
GLUCOSE BLD-MCNC: 328 MG/DL (ref 70–99)
GLUCOSE BLD-MCNC: 336 MG/DL (ref 70–99)
GLUCOSE BLD-MCNC: 337 MG/DL (ref 70–99)
GLUCOSE BLD-MCNC: 343 MG/DL (ref 70–99)
GLUCOSE BLD-MCNC: 344 MG/DL (ref 70–99)
GLUCOSE BLD-MCNC: 353 MG/DL (ref 70–99)
GLUCOSE BLD-MCNC: 354 MG/DL (ref 70–99)
GLUCOSE BLD-MCNC: 371 MG/DL (ref 70–99)
GLUCOSE BLD-MCNC: 373 MG/DL (ref 70–99)
GLUCOSE BLD-MCNC: 386 MG/DL (ref 70–99)
GLUCOSE BLD-MCNC: 386 MG/DL (ref 70–99)
GLUCOSE BLD-MCNC: 396 MG/DL (ref 70–99)
GLUCOSE BLD-MCNC: 397 MG/DL (ref 70–99)
GLUCOSE BLD-MCNC: 412 MG/DL (ref 70–99)
GLUCOSE BLD-MCNC: 434 MG/DL (ref 70–99)
GLUCOSE BLD-MCNC: 437 MG/DL (ref 70–99)
GLUCOSE BLD-MCNC: 437 MG/DL (ref 70–99)
GLUCOSE BLD-MCNC: 442 MG/DL (ref 70–99)
GLUCOSE BLD-MCNC: 442 MG/DL (ref 70–99)
GLUCOSE BLD-MCNC: 450 MG/DL (ref 70–99)
GLUCOSE BLD-MCNC: 450 MG/DL (ref 70–99)
GLUCOSE BLD-MCNC: 451 MG/DL (ref 70–99)
GLUCOSE BLD-MCNC: 452 MG/DL (ref 70–99)
GLUCOSE BLD-MCNC: 46 MG/DL (ref 70–99)
GLUCOSE BLD-MCNC: 465 MG/DL (ref 70–99)
GLUCOSE BLD-MCNC: 466 MG/DL (ref 70–99)
GLUCOSE BLD-MCNC: 472 MG/DL (ref 70–99)
GLUCOSE BLD-MCNC: 479 MG/DL (ref 70–99)
GLUCOSE BLD-MCNC: 480 MG/DL (ref 70–99)
GLUCOSE BLD-MCNC: 52 MG/DL (ref 70–99)
GLUCOSE BLD-MCNC: 529 MG/DL (ref 70–99)
GLUCOSE BLD-MCNC: 53 MG/DL (ref 70–99)
GLUCOSE BLD-MCNC: 57 MG/DL (ref 70–99)
GLUCOSE BLD-MCNC: 59 MG/DL (ref 70–99)
GLUCOSE BLD-MCNC: 599 MG/DL (ref 70–99)
GLUCOSE BLD-MCNC: 62 MG/DL (ref 70–99)
GLUCOSE BLD-MCNC: 63 MG/DL (ref 70–99)
GLUCOSE BLD-MCNC: 63 MG/DL (ref 70–99)
GLUCOSE BLD-MCNC: 64 MG/DL (ref 70–99)
GLUCOSE BLD-MCNC: 67 MG/DL (ref 70–99)
GLUCOSE BLD-MCNC: 69 MG/DL (ref 70–99)
GLUCOSE BLD-MCNC: 69 MG/DL (ref 70–99)
GLUCOSE BLD-MCNC: 71 MG/DL (ref 70–99)
GLUCOSE BLD-MCNC: 72 MG/DL (ref 70–99)
GLUCOSE BLD-MCNC: 72 MG/DL (ref 70–99)
GLUCOSE BLD-MCNC: 74 MG/DL (ref 70–99)
GLUCOSE BLD-MCNC: 75 MG/DL (ref 70–99)
GLUCOSE BLD-MCNC: 76 MG/DL (ref 70–99)
GLUCOSE BLD-MCNC: 77 MG/DL (ref 70–99)
GLUCOSE BLD-MCNC: 79 MG/DL (ref 70–99)
GLUCOSE BLD-MCNC: 79 MG/DL (ref 70–99)
GLUCOSE BLD-MCNC: 81 MG/DL (ref 70–99)
GLUCOSE BLD-MCNC: 83 MG/DL (ref 70–99)
GLUCOSE BLD-MCNC: 83 MG/DL (ref 70–99)
GLUCOSE BLD-MCNC: 85 MG/DL (ref 70–99)
GLUCOSE BLD-MCNC: 85 MG/DL (ref 70–99)
GLUCOSE BLD-MCNC: 87 MG/DL
GLUCOSE BLD-MCNC: 87 MG/DL (ref 70–99)
GLUCOSE BLD-MCNC: 87 MG/DL (ref 70–99)
GLUCOSE BLD-MCNC: 88 MG/DL (ref 70–99)
GLUCOSE BLD-MCNC: 89 MG/DL (ref 70–99)
GLUCOSE BLD-MCNC: 90 MG/DL (ref 70–99)
GLUCOSE BLD-MCNC: 90 MG/DL (ref 70–99)
GLUCOSE BLD-MCNC: 91 MG/DL (ref 70–99)
GLUCOSE BLD-MCNC: 92 MG/DL (ref 70–99)
GLUCOSE BLD-MCNC: 92 MG/DL (ref 70–99)
GLUCOSE BLD-MCNC: 94 MG/DL (ref 70–99)
GLUCOSE BLD-MCNC: 95 MG/DL (ref 70–99)
GLUCOSE BLD-MCNC: 97 MG/DL (ref 70–99)
GLUCOSE BLD-MCNC: 98 MG/DL (ref 70–99)
GLUCOSE BLD-MCNC: 99 MG/DL (ref 70–99)
GLUCOSE BLD-MCNC: 99 MG/DL (ref 70–99)
GLUCOSE URINE: 500 MG/DL
GLUCOSE URINE: NEGATIVE MG/DL
GRAM STAIN RESULT: ABNORMAL
GRAM STAIN RESULT: ABNORMAL
HBA1C MFR BLD: 6.3 %
HBA1C MFR BLD: 6.4 %
HBA1C MFR BLD: 7.6 %
HBV SURFACE AB TITR SER: <3.5 MIU/ML
HCO3 ARTERIAL: 11.9 MMOL/L (ref 21–29)
HCO3 ARTERIAL: 16 MMOL/L (ref 21–29)
HCO3 ARTERIAL: 21.3 MMOL/L (ref 21–29)
HCO3 ARTERIAL: 22.9 MMOL/L (ref 21–29)
HCO3 ARTERIAL: 23.5 MMOL/L (ref 21–29)
HCO3 ARTERIAL: 25.3 MMOL/L (ref 21–29)
HCO3 VENOUS: 17 MMOL/L (ref 23–29)
HCO3 VENOUS: 29.6 MMOL/L (ref 23–29)
HCT VFR BLD CALC: 31.3 % (ref 40.5–52.5)
HCT VFR BLD CALC: 31.7 % (ref 40.5–52.5)
HCT VFR BLD CALC: 32.4 % (ref 40.5–52.5)
HCT VFR BLD CALC: 32.6 % (ref 40.5–52.5)
HCT VFR BLD CALC: 34.3 % (ref 40.5–52.5)
HCT VFR BLD CALC: 34.9 % (ref 40.5–52.5)
HCT VFR BLD CALC: 34.9 % (ref 40.5–52.5)
HCT VFR BLD CALC: 37.2 % (ref 40.5–52.5)
HCT VFR BLD CALC: 37.4 % (ref 40.5–52.5)
HCT VFR BLD CALC: 37.4 % (ref 40.5–52.5)
HCT VFR BLD CALC: 37.6 % (ref 40.5–52.5)
HCT VFR BLD CALC: 37.6 % (ref 40.5–52.5)
HCT VFR BLD CALC: 37.9 % (ref 40.5–52.5)
HCT VFR BLD CALC: 38 % (ref 40.5–52.5)
HCT VFR BLD CALC: 38 % (ref 40.5–52.5)
HCT VFR BLD CALC: 38.1 % (ref 40.5–52.5)
HCT VFR BLD CALC: 38.1 % (ref 40.5–52.5)
HCT VFR BLD CALC: 38.6 % (ref 40.5–52.5)
HCT VFR BLD CALC: 39 % (ref 40.5–52.5)
HCT VFR BLD CALC: 39.2 % (ref 40.5–52.5)
HCT VFR BLD CALC: 39.6 % (ref 40.5–52.5)
HCT VFR BLD CALC: 39.7 % (ref 40.5–52.5)
HCT VFR BLD CALC: 40 % (ref 40.5–52.5)
HCT VFR BLD CALC: 40.3 % (ref 40.5–52.5)
HCT VFR BLD CALC: 40.7 % (ref 40.5–52.5)
HCT VFR BLD CALC: 41.1 % (ref 40.5–52.5)
HCT VFR BLD CALC: 41.2 % (ref 40.5–52.5)
HCT VFR BLD CALC: 41.2 % (ref 40.5–52.5)
HCT VFR BLD CALC: 41.5 % (ref 40.5–52.5)
HCT VFR BLD CALC: 42.8 % (ref 40.5–52.5)
HCT VFR BLD CALC: 44.4 % (ref 40.5–52.5)
HCT VFR BLD CALC: 44.9 % (ref 40.5–52.5)
HEMATOLOGY PATH CONSULT: NO
HEMATOLOGY PATH CONSULT: NO
HEMATOLOGY PATH CONSULT: NORMAL
HEMATOLOGY PATH CONSULT: YES
HEMOGLOBIN, ART, EXTENDED: 13 G/DL (ref 13.5–17.5)
HEMOGLOBIN, ART, EXTENDED: 13.1 G/DL (ref 13.5–17.5)
HEMOGLOBIN, ART, EXTENDED: 13.2 G/DL (ref 13.5–17.5)
HEMOGLOBIN, ART, EXTENDED: 13.2 G/DL (ref 13.5–17.5)
HEMOGLOBIN, ART, EXTENDED: 13.7 G/DL (ref 13.5–17.5)
HEMOGLOBIN, ART, EXTENDED: 14 G/DL (ref 13.5–17.5)
HEMOGLOBIN: 10.1 G/DL (ref 13.5–17.5)
HEMOGLOBIN: 10.6 G/DL (ref 13.5–17.5)
HEMOGLOBIN: 10.9 G/DL (ref 13.5–17.5)
HEMOGLOBIN: 11.4 G/DL (ref 13.5–17.5)
HEMOGLOBIN: 11.6 G/DL (ref 13.5–17.5)
HEMOGLOBIN: 11.7 G/DL (ref 13.5–17.5)
HEMOGLOBIN: 11.8 G/DL (ref 13.5–17.5)
HEMOGLOBIN: 11.8 G/DL (ref 13.5–17.5)
HEMOGLOBIN: 11.9 G/DL (ref 13.5–17.5)
HEMOGLOBIN: 12 G/DL (ref 13.5–17.5)
HEMOGLOBIN: 12 G/DL (ref 13.5–17.5)
HEMOGLOBIN: 12.1 G/DL (ref 13.5–17.5)
HEMOGLOBIN: 12.2 G/DL (ref 13.5–17.5)
HEMOGLOBIN: 12.2 G/DL (ref 13.5–17.5)
HEMOGLOBIN: 12.3 G/DL (ref 13.5–17.5)
HEMOGLOBIN: 12.3 G/DL (ref 13.5–17.5)
HEMOGLOBIN: 12.4 G/DL (ref 13.5–17.5)
HEMOGLOBIN: 12.5 G/DL (ref 13.5–17.5)
HEMOGLOBIN: 12.5 G/DL (ref 13.5–17.5)
HEMOGLOBIN: 12.9 G/DL (ref 13.5–17.5)
HEMOGLOBIN: 12.9 G/DL (ref 13.5–17.5)
HEMOGLOBIN: 13 G/DL (ref 13.5–17.5)
HEMOGLOBIN: 13.1 G/DL (ref 13.5–17.5)
HEMOGLOBIN: 13.4 G/DL (ref 13.5–17.5)
HEMOGLOBIN: 13.5 G/DL (ref 13.5–17.5)
HEMOGLOBIN: 14.1 G/DL (ref 13.5–17.5)
HEMOGLOBIN: 14.3 G/DL (ref 13.5–17.5)
HEMOGLOBIN: 9.8 G/DL (ref 13.5–17.5)
HEMOGLOBIN: 9.8 G/DL (ref 13.5–17.5)
HEMOGLOBIN: 9.9 G/DL (ref 13.5–17.5)
HEPATITIS B CORE TOTAL ANTIBODY: NEGATIVE
HEPATITIS B SURFACE ANTIGEN INTERPRETATION: NORMAL
HEPATITIS B SURFACE ANTIGEN INTERPRETATION: NORMAL
HYPOCHROMIA: ABNORMAL
HYPOCHROMIA: ABNORMAL
IC %PRED: NORMAL
IC PRED: NORMAL
IC: NORMAL
INFLUENZA A: NOT DETECTED
INFLUENZA B: NOT DETECTED
INR BLD: 1.26 (ref 0.88–1.12)
INR BLD: 1.48 (ref 0.88–1.12)
INR BLD: 2.37 (ref 0.88–1.12)
KETONES, URINE: ABNORMAL MG/DL
KETONES, URINE: ABNORMAL MG/DL
KETONES, URINE: NEGATIVE MG/DL
LACTIC ACID, SEPSIS: 1.3 MMOL/L (ref 0.4–1.9)
LACTIC ACID, SEPSIS: 2.6 MMOL/L (ref 0.4–1.9)
LACTIC ACID, SEPSIS: 3 MMOL/L (ref 0.4–1.9)
LACTIC ACID: 1.7 MMOL/L (ref 0.4–2)
LACTIC ACID: 1.9 MMOL/L (ref 0.4–2)
LACTIC ACID: 1.9 MMOL/L (ref 0.4–2)
LACTIC ACID: 3.2 MMOL/L (ref 0.4–2)
LACTIC ACID: 4.9 MMOL/L (ref 0.4–2)
LACTIC ACID: 5 MMOL/L (ref 0.4–2)
LACTIC ACID: 5.9 MMOL/L (ref 0.4–2)
LACTIC ACID: 6.6 MMOL/L (ref 0.4–2)
LEUKOCYTE ESTERASE, URINE: ABNORMAL
LIPASE: 10 U/L (ref 13–60)
LIPASE: 6 U/L (ref 13–60)
LIPASE: 8 U/L (ref 13–60)
LYMPHOCYTES ABSOLUTE: 0.1 K/UL (ref 1–5.1)
LYMPHOCYTES ABSOLUTE: 0.4 K/UL (ref 1–5.1)
LYMPHOCYTES ABSOLUTE: 0.5 K/UL (ref 1–5.1)
LYMPHOCYTES ABSOLUTE: 0.6 K/UL (ref 1–5.1)
LYMPHOCYTES ABSOLUTE: 0.7 K/UL (ref 1–5.1)
LYMPHOCYTES ABSOLUTE: 0.8 K/UL (ref 1–5.1)
LYMPHOCYTES ABSOLUTE: 0.9 K/UL (ref 1–5.1)
LYMPHOCYTES ABSOLUTE: 0.9 K/UL (ref 1–5.1)
LYMPHOCYTES ABSOLUTE: 1 K/UL (ref 1–5.1)
LYMPHOCYTES ABSOLUTE: 1.1 K/UL (ref 1–5.1)
LYMPHOCYTES ABSOLUTE: 1.1 K/UL (ref 1–5.1)
LYMPHOCYTES ABSOLUTE: 1.3 K/UL (ref 1–5.1)
LYMPHOCYTES ABSOLUTE: 1.3 K/UL (ref 1–5.1)
LYMPHOCYTES ABSOLUTE: 1.8 K/UL (ref 1–5.1)
LYMPHOCYTES ABSOLUTE: 2.5 K/UL (ref 1–5.1)
LYMPHOCYTES RELATIVE PERCENT: 1 %
LYMPHOCYTES RELATIVE PERCENT: 11 %
LYMPHOCYTES RELATIVE PERCENT: 13.2 %
LYMPHOCYTES RELATIVE PERCENT: 18 %
LYMPHOCYTES RELATIVE PERCENT: 2 %
LYMPHOCYTES RELATIVE PERCENT: 2 %
LYMPHOCYTES RELATIVE PERCENT: 3.7 %
LYMPHOCYTES RELATIVE PERCENT: 4 %
LYMPHOCYTES RELATIVE PERCENT: 5 %
LYMPHOCYTES RELATIVE PERCENT: 5.3 %
LYMPHOCYTES RELATIVE PERCENT: 5.3 %
LYMPHOCYTES RELATIVE PERCENT: 6 %
LYMPHOCYTES RELATIVE PERCENT: 6.5 %
LYMPHOCYTES RELATIVE PERCENT: 6.7 %
LYMPHOCYTES RELATIVE PERCENT: 6.7 %
LYMPHOCYTES RELATIVE PERCENT: 7.1 %
LYMPHOCYTES RELATIVE PERCENT: 7.3 %
LYMPHOCYTES RELATIVE PERCENT: 7.6 %
LYMPHOCYTES RELATIVE PERCENT: 7.7 %
LYMPHOCYTES RELATIVE PERCENT: 7.7 %
LYMPHOCYTES RELATIVE PERCENT: 8 %
LYMPHOCYTES RELATIVE PERCENT: 8.1 %
LYMPHOCYTES RELATIVE PERCENT: 8.1 %
LYMPHOCYTES RELATIVE PERCENT: 8.5 %
LYMPHOCYTES RELATIVE PERCENT: 9 %
LYMPHOCYTES RELATIVE PERCENT: 9.4 %
MAGNESIUM: 1.2 MG/DL (ref 1.8–2.4)
MAGNESIUM: 1.4 MG/DL (ref 1.8–2.4)
MAGNESIUM: 1.5 MG/DL (ref 1.8–2.4)
MAGNESIUM: 1.6 MG/DL (ref 1.8–2.4)
MAGNESIUM: 1.7 MG/DL (ref 1.8–2.4)
MAGNESIUM: 1.8 MG/DL (ref 1.8–2.4)
MAGNESIUM: 1.9 MG/DL (ref 1.8–2.4)
MAGNESIUM: 2 MG/DL (ref 1.8–2.4)
MAGNESIUM: 2.1 MG/DL (ref 1.8–2.4)
MAGNESIUM: 2.2 MG/DL (ref 1.8–2.4)
MAGNESIUM: 2.3 MG/DL (ref 1.8–2.4)
MAGNESIUM: 2.4 MG/DL (ref 1.8–2.4)
MAGNESIUM: 2.4 MG/DL (ref 1.8–2.4)
MCH RBC QN AUTO: 23.9 PG (ref 26–34)
MCH RBC QN AUTO: 23.9 PG (ref 26–34)
MCH RBC QN AUTO: 24 PG (ref 26–34)
MCH RBC QN AUTO: 24.1 PG (ref 26–34)
MCH RBC QN AUTO: 24.1 PG (ref 26–34)
MCH RBC QN AUTO: 24.2 PG (ref 26–34)
MCH RBC QN AUTO: 24.3 PG (ref 26–34)
MCH RBC QN AUTO: 24.4 PG (ref 26–34)
MCH RBC QN AUTO: 24.5 PG (ref 26–34)
MCH RBC QN AUTO: 24.7 PG (ref 26–34)
MCH RBC QN AUTO: 25.1 PG (ref 26–34)
MCH RBC QN AUTO: 25.2 PG (ref 26–34)
MCH RBC QN AUTO: 27.1 PG (ref 26–34)
MCH RBC QN AUTO: 27.1 PG (ref 26–34)
MCH RBC QN AUTO: 27.3 PG (ref 26–34)
MCH RBC QN AUTO: 27.4 PG (ref 26–34)
MCH RBC QN AUTO: 27.5 PG (ref 26–34)
MCH RBC QN AUTO: 27.6 PG (ref 26–34)
MCH RBC QN AUTO: 27.7 PG (ref 26–34)
MCH RBC QN AUTO: 27.8 PG (ref 26–34)
MCH RBC QN AUTO: 27.9 PG (ref 26–34)
MCH RBC QN AUTO: 28 PG (ref 26–34)
MCHC RBC AUTO-ENTMCNC: 30 G/DL (ref 31–36)
MCHC RBC AUTO-ENTMCNC: 30.4 G/DL (ref 31–36)
MCHC RBC AUTO-ENTMCNC: 30.4 G/DL (ref 31–36)
MCHC RBC AUTO-ENTMCNC: 30.5 G/DL (ref 31–36)
MCHC RBC AUTO-ENTMCNC: 30.6 G/DL (ref 31–36)
MCHC RBC AUTO-ENTMCNC: 30.6 G/DL (ref 31–36)
MCHC RBC AUTO-ENTMCNC: 30.7 G/DL (ref 31–36)
MCHC RBC AUTO-ENTMCNC: 30.8 G/DL (ref 31–36)
MCHC RBC AUTO-ENTMCNC: 30.8 G/DL (ref 31–36)
MCHC RBC AUTO-ENTMCNC: 30.9 G/DL (ref 31–36)
MCHC RBC AUTO-ENTMCNC: 30.9 G/DL (ref 31–36)
MCHC RBC AUTO-ENTMCNC: 31 G/DL (ref 31–36)
MCHC RBC AUTO-ENTMCNC: 31 G/DL (ref 31–36)
MCHC RBC AUTO-ENTMCNC: 31.1 G/DL (ref 31–36)
MCHC RBC AUTO-ENTMCNC: 31.2 G/DL (ref 31–36)
MCHC RBC AUTO-ENTMCNC: 31.3 G/DL (ref 31–36)
MCHC RBC AUTO-ENTMCNC: 31.3 G/DL (ref 31–36)
MCHC RBC AUTO-ENTMCNC: 31.4 G/DL (ref 31–36)
MCHC RBC AUTO-ENTMCNC: 31.5 G/DL (ref 31–36)
MCHC RBC AUTO-ENTMCNC: 31.8 G/DL (ref 31–36)
MCHC RBC AUTO-ENTMCNC: 31.9 G/DL (ref 31–36)
MCHC RBC AUTO-ENTMCNC: 31.9 G/DL (ref 31–36)
MCHC RBC AUTO-ENTMCNC: 32 G/DL (ref 31–36)
MCHC RBC AUTO-ENTMCNC: 32.1 G/DL (ref 31–36)
MCHC RBC AUTO-ENTMCNC: 32.1 G/DL (ref 31–36)
MCHC RBC AUTO-ENTMCNC: 32.4 G/DL (ref 31–36)
MCHC RBC AUTO-ENTMCNC: 32.4 G/DL (ref 31–36)
MCHC RBC AUTO-ENTMCNC: 32.8 G/DL (ref 31–36)
MCV RBC AUTO: 75.9 FL (ref 80–100)
MCV RBC AUTO: 76 FL (ref 80–100)
MCV RBC AUTO: 77.4 FL (ref 80–100)
MCV RBC AUTO: 77.4 FL (ref 80–100)
MCV RBC AUTO: 77.9 FL (ref 80–100)
MCV RBC AUTO: 78.2 FL (ref 80–100)
MCV RBC AUTO: 78.3 FL (ref 80–100)
MCV RBC AUTO: 78.3 FL (ref 80–100)
MCV RBC AUTO: 78.4 FL (ref 80–100)
MCV RBC AUTO: 78.5 FL (ref 80–100)
MCV RBC AUTO: 78.6 FL (ref 80–100)
MCV RBC AUTO: 78.8 FL (ref 80–100)
MCV RBC AUTO: 78.8 FL (ref 80–100)
MCV RBC AUTO: 78.9 FL (ref 80–100)
MCV RBC AUTO: 79.5 FL (ref 80–100)
MCV RBC AUTO: 80 FL (ref 80–100)
MCV RBC AUTO: 80.3 FL (ref 80–100)
MCV RBC AUTO: 80.4 FL (ref 80–100)
MCV RBC AUTO: 80.4 FL (ref 80–100)
MCV RBC AUTO: 81.2 FL (ref 80–100)
MCV RBC AUTO: 83.6 FL (ref 80–100)
MCV RBC AUTO: 84.3 FL (ref 80–100)
MCV RBC AUTO: 84.7 FL (ref 80–100)
MCV RBC AUTO: 84.9 FL (ref 80–100)
MCV RBC AUTO: 85.1 FL (ref 80–100)
MCV RBC AUTO: 85.8 FL (ref 80–100)
MCV RBC AUTO: 85.9 FL (ref 80–100)
MCV RBC AUTO: 85.9 FL (ref 80–100)
MCV RBC AUTO: 86 FL (ref 80–100)
MCV RBC AUTO: 86.3 FL (ref 80–100)
MCV RBC AUTO: 86.6 FL (ref 80–100)
MCV RBC AUTO: 87.1 FL (ref 80–100)
MEP: NORMAL
METAMYELOCYTES RELATIVE PERCENT: 1 %
METHEMOGLOBIN ARTERIAL: 0.3 %
METHEMOGLOBIN VENOUS: 0.3 %
METHEMOGLOBIN VENOUS: 0.3 %
MICROSCOPIC EXAMINATION: YES
MIP: NORMAL
MONOCYTES ABSOLUTE: 0.2 K/UL (ref 0–1.3)
MONOCYTES ABSOLUTE: 0.2 K/UL (ref 0–1.3)
MONOCYTES ABSOLUTE: 0.3 K/UL (ref 0–1.3)
MONOCYTES ABSOLUTE: 0.5 K/UL (ref 0–1.3)
MONOCYTES ABSOLUTE: 0.5 K/UL (ref 0–1.3)
MONOCYTES ABSOLUTE: 0.6 K/UL (ref 0–1.3)
MONOCYTES ABSOLUTE: 0.7 K/UL (ref 0–1.3)
MONOCYTES ABSOLUTE: 0.8 K/UL (ref 0–1.3)
MONOCYTES ABSOLUTE: 0.9 K/UL (ref 0–1.3)
MONOCYTES ABSOLUTE: 1 K/UL (ref 0–1.3)
MONOCYTES ABSOLUTE: 1.1 K/UL (ref 0–1.3)
MONOCYTES ABSOLUTE: 1.2 K/UL (ref 0–1.3)
MONOCYTES ABSOLUTE: 1.2 K/UL (ref 0–1.3)
MONOCYTES ABSOLUTE: 1.3 K/UL (ref 0–1.3)
MONOCYTES RELATIVE PERCENT: 10.1 %
MONOCYTES RELATIVE PERCENT: 10.1 %
MONOCYTES RELATIVE PERCENT: 10.5 %
MONOCYTES RELATIVE PERCENT: 10.8 %
MONOCYTES RELATIVE PERCENT: 2 %
MONOCYTES RELATIVE PERCENT: 3 %
MONOCYTES RELATIVE PERCENT: 3 %
MONOCYTES RELATIVE PERCENT: 4 %
MONOCYTES RELATIVE PERCENT: 4.5 %
MONOCYTES RELATIVE PERCENT: 4.7 %
MONOCYTES RELATIVE PERCENT: 5 %
MONOCYTES RELATIVE PERCENT: 6 %
MONOCYTES RELATIVE PERCENT: 6.1 %
MONOCYTES RELATIVE PERCENT: 6.6 %
MONOCYTES RELATIVE PERCENT: 6.8 %
MONOCYTES RELATIVE PERCENT: 7 %
MONOCYTES RELATIVE PERCENT: 7.2 %
MONOCYTES RELATIVE PERCENT: 7.7 %
MONOCYTES RELATIVE PERCENT: 7.7 %
MONOCYTES RELATIVE PERCENT: 7.9 %
MONOCYTES RELATIVE PERCENT: 8 %
MONOCYTES RELATIVE PERCENT: 8 %
MONOCYTES RELATIVE PERCENT: 8.6 %
MONOCYTES RELATIVE PERCENT: 8.7 %
MONOCYTES RELATIVE PERCENT: 8.9 %
MONOCYTES RELATIVE PERCENT: 8.9 %
MONONUCLEAR UNIDENTIFIED CELLS: 1 %
MVV %PRED-PRE: NORMAL
MVV PRED: NORMAL
MVV-PRE: NORMAL
MYELOCYTE PERCENT: 2 %
MYELOCYTE PERCENT: 5 %
NEUTROPHILS ABSOLUTE: 10.1 K/UL (ref 1.7–7.7)
NEUTROPHILS ABSOLUTE: 10.3 K/UL (ref 1.7–7.7)
NEUTROPHILS ABSOLUTE: 10.5 K/UL (ref 1.7–7.7)
NEUTROPHILS ABSOLUTE: 10.5 K/UL (ref 1.7–7.7)
NEUTROPHILS ABSOLUTE: 10.7 K/UL (ref 1.7–7.7)
NEUTROPHILS ABSOLUTE: 10.8 K/UL (ref 1.7–7.7)
NEUTROPHILS ABSOLUTE: 11 K/UL (ref 1.7–7.7)
NEUTROPHILS ABSOLUTE: 12.3 K/UL (ref 1.7–7.7)
NEUTROPHILS ABSOLUTE: 12.4 K/UL (ref 1.7–7.7)
NEUTROPHILS ABSOLUTE: 15.2 K/UL (ref 1.7–7.7)
NEUTROPHILS ABSOLUTE: 16.4 K/UL (ref 1.7–7.7)
NEUTROPHILS ABSOLUTE: 19.1 K/UL (ref 1.7–7.7)
NEUTROPHILS ABSOLUTE: 19.5 K/UL (ref 1.7–7.7)
NEUTROPHILS ABSOLUTE: 26.7 K/UL (ref 1.7–7.7)
NEUTROPHILS ABSOLUTE: 33.5 K/UL (ref 1.7–7.7)
NEUTROPHILS ABSOLUTE: 5.3 K/UL (ref 1.7–7.7)
NEUTROPHILS ABSOLUTE: 6.1 K/UL (ref 1.7–7.7)
NEUTROPHILS ABSOLUTE: 6.2 K/UL (ref 1.7–7.7)
NEUTROPHILS ABSOLUTE: 6.4 K/UL (ref 1.7–7.7)
NEUTROPHILS ABSOLUTE: 6.9 K/UL (ref 1.7–7.7)
NEUTROPHILS ABSOLUTE: 7.1 K/UL (ref 1.7–7.7)
NEUTROPHILS ABSOLUTE: 7.2 K/UL (ref 1.7–7.7)
NEUTROPHILS ABSOLUTE: 7.4 K/UL (ref 1.7–7.7)
NEUTROPHILS ABSOLUTE: 7.5 K/UL (ref 1.7–7.7)
NEUTROPHILS ABSOLUTE: 7.6 K/UL (ref 1.7–7.7)
NEUTROPHILS ABSOLUTE: 8.2 K/UL (ref 1.7–7.7)
NEUTROPHILS ABSOLUTE: 8.3 K/UL (ref 1.7–7.7)
NEUTROPHILS ABSOLUTE: 8.5 K/UL (ref 1.7–7.7)
NEUTROPHILS ABSOLUTE: 9.1 K/UL (ref 1.7–7.7)
NEUTROPHILS ABSOLUTE: 9.5 K/UL (ref 1.7–7.7)
NEUTROPHILS RELATIVE PERCENT: 74 %
NEUTROPHILS RELATIVE PERCENT: 76.9 %
NEUTROPHILS RELATIVE PERCENT: 79 %
NEUTROPHILS RELATIVE PERCENT: 79.2 %
NEUTROPHILS RELATIVE PERCENT: 79.4 %
NEUTROPHILS RELATIVE PERCENT: 79.6 %
NEUTROPHILS RELATIVE PERCENT: 79.6 %
NEUTROPHILS RELATIVE PERCENT: 80 %
NEUTROPHILS RELATIVE PERCENT: 80 %
NEUTROPHILS RELATIVE PERCENT: 80.5 %
NEUTROPHILS RELATIVE PERCENT: 80.8 %
NEUTROPHILS RELATIVE PERCENT: 81 %
NEUTROPHILS RELATIVE PERCENT: 81.9 %
NEUTROPHILS RELATIVE PERCENT: 81.9 %
NEUTROPHILS RELATIVE PERCENT: 82.3 %
NEUTROPHILS RELATIVE PERCENT: 83 %
NEUTROPHILS RELATIVE PERCENT: 83 %
NEUTROPHILS RELATIVE PERCENT: 83.8 %
NEUTROPHILS RELATIVE PERCENT: 84 %
NEUTROPHILS RELATIVE PERCENT: 85.1 %
NEUTROPHILS RELATIVE PERCENT: 85.4 %
NEUTROPHILS RELATIVE PERCENT: 85.4 %
NEUTROPHILS RELATIVE PERCENT: 87 %
NEUTROPHILS RELATIVE PERCENT: 88 %
NEUTROPHILS RELATIVE PERCENT: 89 %
NEUTROPHILS RELATIVE PERCENT: 90 %
NEUTROPHILS RELATIVE PERCENT: 90.7 %
NEUTROPHILS RELATIVE PERCENT: 93 %
NEUTROPHILS RELATIVE PERCENT: 93.3 %
NEUTROPHILS RELATIVE PERCENT: 94 %
NITRITE, URINE: NEGATIVE
NUCLEATED RED BLOOD CELLS: 1 /100 WBC
NUCLEATED RED BLOOD CELLS: 1 /100 WBC
NUCLEATED RED BLOOD CELLS: 2 /100 WBC
NUCLEATED RED BLOOD CELLS: 3 /100 WBC
NUCLEATED RED BLOOD CELLS: 6 /100 WBC
O2 CONTENT, VEN: 14 VOL %
O2 SAT, ARTERIAL: 92 %
O2 SAT, ARTERIAL: 97.2 %
O2 SAT, ARTERIAL: 98.5 %
O2 SAT, ARTERIAL: 98.6 %
O2 SAT, ARTERIAL: 99.1 %
O2 SAT, ARTERIAL: 99.5 %
O2 SAT, VEN: 51 %
O2 SAT, VEN: 82 %
O2 THERAPY: ABNORMAL
ORGANISM: ABNORMAL
OSMOLALITY: 293 MOSM/KG (ref 275–295)
PCO2 ARTERIAL: 33.6 MMHG (ref 35–45)
PCO2 ARTERIAL: 34.1 MMHG (ref 35–45)
PCO2 ARTERIAL: 36.9 MMHG (ref 35–45)
PCO2 ARTERIAL: 39.8 MMHG (ref 35–45)
PCO2 ARTERIAL: 41.3 MMHG (ref 35–45)
PCO2 ARTERIAL: 46.3 MMHG (ref 35–45)
PCO2, VEN: 40.7 MMHG (ref 40–50)
PCO2, VEN: 49.5 MMHG (ref 40–50)
PDW BLD-RTO: 15 % (ref 12.4–15.4)
PDW BLD-RTO: 15.1 % (ref 12.4–15.4)
PDW BLD-RTO: 15.1 % (ref 12.4–15.4)
PDW BLD-RTO: 15.3 % (ref 12.4–15.4)
PDW BLD-RTO: 15.3 % (ref 12.4–15.4)
PDW BLD-RTO: 15.5 % (ref 12.4–15.4)
PDW BLD-RTO: 16 % (ref 12.4–15.4)
PDW BLD-RTO: 16 % (ref 12.4–15.4)
PDW BLD-RTO: 16.1 % (ref 12.4–15.4)
PDW BLD-RTO: 16.4 % (ref 12.4–15.4)
PDW BLD-RTO: 16.4 % (ref 12.4–15.4)
PDW BLD-RTO: 16.5 % (ref 12.4–15.4)
PDW BLD-RTO: 17.7 % (ref 12.4–15.4)
PDW BLD-RTO: 18.4 % (ref 12.4–15.4)
PDW BLD-RTO: 18.8 % (ref 12.4–15.4)
PDW BLD-RTO: 20 % (ref 12.4–15.4)
PDW BLD-RTO: 20.1 % (ref 12.4–15.4)
PDW BLD-RTO: 20.2 % (ref 12.4–15.4)
PDW BLD-RTO: 20.2 % (ref 12.4–15.4)
PDW BLD-RTO: 20.3 % (ref 12.4–15.4)
PDW BLD-RTO: 20.4 % (ref 12.4–15.4)
PDW BLD-RTO: 20.5 % (ref 12.4–15.4)
PDW BLD-RTO: 20.6 % (ref 12.4–15.4)
PDW BLD-RTO: 20.7 % (ref 12.4–15.4)
PDW BLD-RTO: 20.8 % (ref 12.4–15.4)
PDW BLD-RTO: 21.1 % (ref 12.4–15.4)
PEF %PRED-POST: NORMAL
PEF %PRED-PRE: NORMAL
PEF PRED: NORMAL
PEF%CHNG: NORMAL
PEF-POST: NORMAL
PEF-PRE: NORMAL
PERFORMED ON: ABNORMAL
PERFORMED ON: NORMAL
PH ARTERIAL: 7.16 (ref 7.35–7.45)
PH ARTERIAL: 7.22 (ref 7.35–7.45)
PH ARTERIAL: 7.36 (ref 7.35–7.45)
PH ARTERIAL: 7.37 (ref 7.35–7.45)
PH ARTERIAL: 7.41 (ref 7.35–7.45)
PH ARTERIAL: 7.42 (ref 7.35–7.45)
PH UA: 5 (ref 5–8)
PH UA: 5.5 (ref 5–8)
PH UA: 5.5 (ref 5–8)
PH UA: 6.5 (ref 5–8)
PH UA: 7 (ref 5–8)
PH VENOUS: 7.1 (ref 7.35–7.45)
PH VENOUS: 7.19 (ref 7.35–7.45)
PH VENOUS: 7.24 (ref 7.35–7.45)
PH VENOUS: 7.27 (ref 7.35–7.45)
PH VENOUS: 7.27 (ref 7.35–7.45)
PH VENOUS: 7.28 (ref 7.35–7.45)
PH VENOUS: 7.29 (ref 7.35–7.45)
PH VENOUS: 7.29 (ref 7.35–7.45)
PH VENOUS: 7.3 (ref 7.35–7.45)
PH VENOUS: 7.31 (ref 7.35–7.45)
PH VENOUS: 7.32 (ref 7.35–7.45)
PH VENOUS: 7.33 (ref 7.35–7.45)
PH VENOUS: 7.34 (ref 7.35–7.45)
PH VENOUS: 7.34 (ref 7.35–7.45)
PH VENOUS: 7.35 (ref 7.35–7.45)
PH VENOUS: 7.36 (ref 7.35–7.45)
PH VENOUS: 7.37 (ref 7.35–7.45)
PH VENOUS: 7.39 (ref 7.35–7.45)
PHOSPHORUS: 1.2 MG/DL (ref 2.5–4.9)
PHOSPHORUS: 1.4 MG/DL (ref 2.5–4.9)
PHOSPHORUS: 1.6 MG/DL (ref 2.5–4.9)
PHOSPHORUS: 1.7 MG/DL (ref 2.5–4.9)
PHOSPHORUS: 1.8 MG/DL (ref 2.5–4.9)
PHOSPHORUS: 1.8 MG/DL (ref 2.5–4.9)
PHOSPHORUS: 1.9 MG/DL (ref 2.5–4.9)
PHOSPHORUS: 2 MG/DL (ref 2.5–4.9)
PHOSPHORUS: 2.1 MG/DL (ref 2.5–4.9)
PHOSPHORUS: 2.2 MG/DL (ref 2.5–4.9)
PHOSPHORUS: 2.2 MG/DL (ref 2.5–4.9)
PHOSPHORUS: 2.3 MG/DL (ref 2.5–4.9)
PHOSPHORUS: 2.3 MG/DL (ref 2.5–4.9)
PHOSPHORUS: 2.4 MG/DL (ref 2.5–4.9)
PHOSPHORUS: 2.4 MG/DL (ref 2.5–4.9)
PHOSPHORUS: 2.5 MG/DL (ref 2.5–4.9)
PHOSPHORUS: 2.6 MG/DL (ref 2.5–4.9)
PHOSPHORUS: 2.6 MG/DL (ref 2.5–4.9)
PHOSPHORUS: 2.7 MG/DL (ref 2.5–4.9)
PHOSPHORUS: 2.8 MG/DL (ref 2.5–4.9)
PHOSPHORUS: 3 MG/DL (ref 2.5–4.9)
PHOSPHORUS: 3 MG/DL (ref 2.5–4.9)
PHOSPHORUS: 3.2 MG/DL (ref 2.5–4.9)
PHOSPHORUS: 3.4 MG/DL (ref 2.5–4.9)
PHOSPHORUS: 3.5 MG/DL (ref 2.5–4.9)
PHOSPHORUS: 3.8 MG/DL (ref 2.5–4.9)
PHOSPHORUS: 3.9 MG/DL (ref 2.5–4.9)
PHOSPHORUS: 4 MG/DL (ref 2.5–4.9)
PHOSPHORUS: 4.1 MG/DL (ref 2.5–4.9)
PHOSPHORUS: 4.1 MG/DL (ref 2.5–4.9)
PHOSPHORUS: 4.2 MG/DL (ref 2.5–4.9)
PHOSPHORUS: 4.6 MG/DL (ref 2.5–4.9)
PHOSPHORUS: 4.9 MG/DL (ref 2.5–4.9)
PHOSPHORUS: 5.7 MG/DL (ref 2.5–4.9)
PHOSPHORUS: 5.7 MG/DL (ref 2.5–4.9)
PHOSPHORUS: 6.2 MG/DL (ref 2.5–4.9)
PHOSPHORUS: 6.4 MG/DL (ref 2.5–4.9)
PLATELET # BLD: 102 K/UL (ref 135–450)
PLATELET # BLD: 113 K/UL (ref 135–450)
PLATELET # BLD: 123 K/UL (ref 135–450)
PLATELET # BLD: 125 K/UL (ref 135–450)
PLATELET # BLD: 125 K/UL (ref 135–450)
PLATELET # BLD: 126 K/UL (ref 135–450)
PLATELET # BLD: 132 K/UL (ref 135–450)
PLATELET # BLD: 140 K/UL (ref 135–450)
PLATELET # BLD: 142 K/UL (ref 135–450)
PLATELET # BLD: 144 K/UL (ref 135–450)
PLATELET # BLD: 145 K/UL (ref 135–450)
PLATELET # BLD: 155 K/UL (ref 135–450)
PLATELET # BLD: 162 K/UL (ref 135–450)
PLATELET # BLD: 164 K/UL (ref 135–450)
PLATELET # BLD: 176 K/UL (ref 135–450)
PLATELET # BLD: 191 K/UL (ref 135–450)
PLATELET # BLD: 206 K/UL (ref 135–450)
PLATELET # BLD: 230 K/UL (ref 135–450)
PLATELET # BLD: 61 K/UL (ref 135–450)
PLATELET # BLD: 69 K/UL (ref 135–450)
PLATELET # BLD: 71 K/UL (ref 135–450)
PLATELET # BLD: 72 K/UL (ref 135–450)
PLATELET # BLD: 73 K/UL (ref 135–450)
PLATELET # BLD: 84 K/UL (ref 135–450)
PLATELET # BLD: 88 K/UL (ref 135–450)
PLATELET # BLD: 90 K/UL (ref 135–450)
PLATELET # BLD: 98 K/UL (ref 135–450)
PLATELET # BLD: ABNORMAL K/UL (ref 135–450)
PLATELET SLIDE REVIEW: ABNORMAL
PLATELET SLIDE REVIEW: ADEQUATE
PLATELET SLIDE REVIEW: ADEQUATE
PMV BLD AUTO: 10.6 FL (ref 5–10.5)
PMV BLD AUTO: 10.8 FL (ref 5–10.5)
PMV BLD AUTO: 11.3 FL (ref 5–10.5)
PMV BLD AUTO: 8 FL (ref 5–10.5)
PMV BLD AUTO: 8.5 FL (ref 5–10.5)
PMV BLD AUTO: 8.9 FL (ref 5–10.5)
PMV BLD AUTO: 8.9 FL (ref 5–10.5)
PMV BLD AUTO: 9 FL (ref 5–10.5)
PMV BLD AUTO: 9.1 FL (ref 5–10.5)
PMV BLD AUTO: 9.2 FL (ref 5–10.5)
PMV BLD AUTO: 9.2 FL (ref 5–10.5)
PMV BLD AUTO: 9.3 FL (ref 5–10.5)
PMV BLD AUTO: 9.4 FL (ref 5–10.5)
PMV BLD AUTO: 9.5 FL (ref 5–10.5)
PMV BLD AUTO: 9.6 FL (ref 5–10.5)
PMV BLD AUTO: 9.7 FL (ref 5–10.5)
PMV BLD AUTO: 9.8 FL (ref 5–10.5)
PMV BLD AUTO: 9.9 FL (ref 5–10.5)
PMV BLD AUTO: ABNORMAL FL (ref 5–10.5)
PO2 ARTERIAL: 123 MMHG (ref 75–108)
PO2 ARTERIAL: 157.8 MMHG (ref 75–108)
PO2 ARTERIAL: 164.8 MMHG (ref 75–108)
PO2 ARTERIAL: 281.3 MMHG (ref 75–108)
PO2 ARTERIAL: 64.1 MMHG (ref 75–108)
PO2 ARTERIAL: 98.2 MMHG (ref 75–108)
PO2, VEN: 31.5 MMHG (ref 25–40)
PO2, VEN: 46.8 MMHG (ref 25–40)
POIKILOCYTES: ABNORMAL
POLYCHROMASIA: ABNORMAL
POTASSIUM REFLEX MAGNESIUM: 3.5 MMOL/L (ref 3.5–5.1)
POTASSIUM REFLEX MAGNESIUM: 3.8 MMOL/L (ref 3.5–5.1)
POTASSIUM REFLEX MAGNESIUM: 4 MMOL/L (ref 3.5–5.1)
POTASSIUM REFLEX MAGNESIUM: 4.3 MMOL/L (ref 3.5–5.1)
POTASSIUM REFLEX MAGNESIUM: 5 MMOL/L (ref 3.5–5.1)
POTASSIUM REFLEX MAGNESIUM: 5.1 MMOL/L (ref 3.5–5.1)
POTASSIUM REFLEX MAGNESIUM: 5.2 MMOL/L (ref 3.5–5.1)
POTASSIUM SERPL-SCNC: 2.7 MMOL/L (ref 3.5–5.1)
POTASSIUM SERPL-SCNC: 2.8 MMOL/L (ref 3.5–5.1)
POTASSIUM SERPL-SCNC: 2.8 MMOL/L (ref 3.5–5.1)
POTASSIUM SERPL-SCNC: 2.9 MMOL/L (ref 3.5–5.1)
POTASSIUM SERPL-SCNC: 2.9 MMOL/L (ref 3.5–5.1)
POTASSIUM SERPL-SCNC: 3 MMOL/L (ref 3.5–5.1)
POTASSIUM SERPL-SCNC: 3.1 MMOL/L (ref 3.5–5.1)
POTASSIUM SERPL-SCNC: 3.3 MMOL/L (ref 3.5–5.1)
POTASSIUM SERPL-SCNC: 3.3 MMOL/L (ref 3.5–5.1)
POTASSIUM SERPL-SCNC: 3.4 MMOL/L (ref 3.5–5.1)
POTASSIUM SERPL-SCNC: 3.4 MMOL/L (ref 3.5–5.1)
POTASSIUM SERPL-SCNC: 3.6 MMOL/L (ref 3.5–5.1)
POTASSIUM SERPL-SCNC: 3.7 MMOL/L (ref 3.5–5.1)
POTASSIUM SERPL-SCNC: 3.8 MMOL/L (ref 3.5–5.1)
POTASSIUM SERPL-SCNC: 3.9 MMOL/L (ref 3.5–5.1)
POTASSIUM SERPL-SCNC: 4 MMOL/L (ref 3.5–5.1)
POTASSIUM SERPL-SCNC: 4.1 MMOL/L (ref 3.5–5.1)
POTASSIUM SERPL-SCNC: 4.2 MMOL/L (ref 3.5–5.1)
POTASSIUM SERPL-SCNC: 4.3 MMOL/L (ref 3.5–5.1)
POTASSIUM SERPL-SCNC: 4.5 MMOL/L (ref 3.5–5.1)
POTASSIUM SERPL-SCNC: 4.6 MMOL/L (ref 3.5–5.1)
POTASSIUM SERPL-SCNC: 4.6 MMOL/L (ref 3.5–5.1)
POTASSIUM SERPL-SCNC: 4.7 MMOL/L (ref 3.5–5.1)
POTASSIUM SERPL-SCNC: 4.7 MMOL/L (ref 3.5–5.1)
POTASSIUM SERPL-SCNC: 4.8 MMOL/L (ref 3.5–5.1)
POTASSIUM SERPL-SCNC: 4.9 MMOL/L (ref 3.5–5.1)
POTASSIUM SERPL-SCNC: 5 MMOL/L (ref 3.5–5.1)
POTASSIUM, UR: 42.2 MMOL/L
PRO-BNP: 9901 PG/ML (ref 0–124)
PRO-BNP: ABNORMAL PG/ML (ref 0–124)
PROCALCITONIN: 0.26 NG/ML (ref 0–0.15)
PROCALCITONIN: 0.27 NG/ML (ref 0–0.15)
PROTEIN PROTEIN: 172 MG/DL
PROTEIN UA: 100 MG/DL
PROTEIN UA: 30 MG/DL
PROTHROMBIN TIME: 14.4 SEC (ref 9.9–12.7)
PROTHROMBIN TIME: 17 SEC (ref 9.9–12.7)
PROTHROMBIN TIME: 27.8 SEC (ref 9.9–12.7)
RAW %PRED: NORMAL
RAW PRED: NORMAL
RAW: NORMAL
RBC # BLD: 3.89 M/UL (ref 4.2–5.9)
RBC # BLD: 3.9 M/UL (ref 4.2–5.9)
RBC # BLD: 4.03 M/UL (ref 4.2–5.9)
RBC # BLD: 4.1 M/UL (ref 4.2–5.9)
RBC # BLD: 4.17 M/UL (ref 4.2–5.9)
RBC # BLD: 4.29 M/UL (ref 4.2–5.9)
RBC # BLD: 4.35 M/UL (ref 4.2–5.9)
RBC # BLD: 4.38 M/UL (ref 4.2–5.9)
RBC # BLD: 4.41 M/UL (ref 4.2–5.9)
RBC # BLD: 4.45 M/UL (ref 4.2–5.9)
RBC # BLD: 4.46 M/UL (ref 4.2–5.9)
RBC # BLD: 4.49 M/UL (ref 4.2–5.9)
RBC # BLD: 4.71 M/UL (ref 4.2–5.9)
RBC # BLD: 4.77 M/UL (ref 4.2–5.9)
RBC # BLD: 4.84 M/UL (ref 4.2–5.9)
RBC # BLD: 4.84 M/UL (ref 4.2–5.9)
RBC # BLD: 4.85 M/UL (ref 4.2–5.9)
RBC # BLD: 4.86 M/UL (ref 4.2–5.9)
RBC # BLD: 4.86 M/UL (ref 4.2–5.9)
RBC # BLD: 4.88 M/UL (ref 4.2–5.9)
RBC # BLD: 4.89 M/UL (ref 4.2–5.9)
RBC # BLD: 4.89 M/UL (ref 4.2–5.9)
RBC # BLD: 4.94 M/UL (ref 4.2–5.9)
RBC # BLD: 4.97 M/UL (ref 4.2–5.9)
RBC # BLD: 5.06 M/UL (ref 4.2–5.9)
RBC # BLD: 5.09 M/UL (ref 4.2–5.9)
RBC # BLD: 5.13 M/UL (ref 4.2–5.9)
RBC # BLD: 5.15 M/UL (ref 4.2–5.9)
RBC # BLD: 5.21 M/UL (ref 4.2–5.9)
RBC # BLD: 5.22 M/UL (ref 4.2–5.9)
RBC # BLD: 5.31 M/UL (ref 4.2–5.9)
RBC # BLD: 5.33 M/UL (ref 4.2–5.9)
RBC UA: >100 /HPF (ref 0–4)
RBC UA: ABNORMAL /HPF (ref 0–4)
REPORT: NORMAL
RV %PRED: NORMAL
RV PRED: NORMAL
RV: NORMAL
SARS-COV-2 RNA, RT PCR: NOT DETECTED
SARS-COV-2, NAAT: NOT DETECTED
SARS-COV-2: NOT DETECTED
SLIDE REVIEW: ABNORMAL
SODIUM BLD-SCNC: 123 MMOL/L (ref 136–145)
SODIUM BLD-SCNC: 123 MMOL/L (ref 136–145)
SODIUM BLD-SCNC: 124 MMOL/L (ref 136–145)
SODIUM BLD-SCNC: 124 MMOL/L (ref 136–145)
SODIUM BLD-SCNC: 125 MMOL/L (ref 136–145)
SODIUM BLD-SCNC: 125 MMOL/L (ref 136–145)
SODIUM BLD-SCNC: 126 MMOL/L (ref 136–145)
SODIUM BLD-SCNC: 127 MMOL/L (ref 136–145)
SODIUM BLD-SCNC: 128 MMOL/L (ref 136–145)
SODIUM BLD-SCNC: 129 MMOL/L (ref 136–145)
SODIUM BLD-SCNC: 130 MMOL/L (ref 136–145)
SODIUM BLD-SCNC: 131 MMOL/L (ref 136–145)
SODIUM BLD-SCNC: 132 MMOL/L (ref 136–145)
SODIUM BLD-SCNC: 133 MMOL/L (ref 136–145)
SODIUM BLD-SCNC: 134 MMOL/L (ref 136–145)
SODIUM BLD-SCNC: 136 MMOL/L (ref 136–145)
SODIUM BLD-SCNC: 140 MMOL/L (ref 136–145)
SODIUM URINE: 21 MMOL/L
SPECIFIC GRAVITY UA: 1.01 (ref 1–1.03)
SPECIFIC GRAVITY UA: 1.01 (ref 1–1.03)
SPECIFIC GRAVITY UA: 1.02 (ref 1–1.03)
SVC %PRED: NORMAL
SVC PRED: NORMAL
SVC: NORMAL
T4 FREE: 1.1 NG/DL (ref 0.9–1.8)
T4 FREE: 1.5 NG/DL (ref 0.9–1.8)
TCO2 ARTERIAL: 12.9 MMOL/L
TCO2 ARTERIAL: 17.2 MMOL/L
TCO2 ARTERIAL: 22.3 MMOL/L
TCO2 ARTERIAL: 24 MMOL/L
TCO2 ARTERIAL: 24.8 MMOL/L
TCO2 ARTERIAL: 26.8 MMOL/L
TCO2 CALC VENOUS: 18 MMOL/L
TCO2 CALC VENOUS: 31 MMOL/L
TLC %PRED: 38 %
TLC PRED: NORMAL
TLC: NORMAL
TOTAL CK: 28 U/L (ref 39–308)
TOTAL PROTEIN: 5.8 G/DL (ref 6.4–8.2)
TOTAL PROTEIN: 5.9 G/DL (ref 6.4–8.2)
TOTAL PROTEIN: 6 G/DL (ref 6.4–8.2)
TOTAL PROTEIN: 6.1 G/DL (ref 6.4–8.2)
TOTAL PROTEIN: 6.3 G/DL (ref 6.4–8.2)
TOTAL PROTEIN: 6.4 G/DL (ref 6.4–8.2)
TOTAL PROTEIN: 6.5 G/DL (ref 6.4–8.2)
TOTAL PROTEIN: 6.5 G/DL (ref 6.4–8.2)
TOTAL PROTEIN: 6.6 G/DL (ref 6.4–8.2)
TOTAL PROTEIN: 6.6 G/DL (ref 6.4–8.2)
TOTAL PROTEIN: 6.7 G/DL (ref 6.4–8.2)
TOTAL PROTEIN: 6.7 G/DL (ref 6.4–8.2)
TOTAL PROTEIN: 7 G/DL (ref 6.4–8.2)
TOTAL PROTEIN: 7.1 G/DL (ref 6.4–8.2)
TOTAL PROTEIN: 7.2 G/DL (ref 6.4–8.2)
TOTAL PROTEIN: 7.3 G/DL (ref 6.4–8.2)
TOTAL PROTEIN: 7.3 G/DL (ref 6.4–8.2)
TOTAL PROTEIN: 7.4 G/DL (ref 6.4–8.2)
TOTAL PROTEIN: 7.5 G/DL (ref 6.4–8.2)
TROPONIN: 0.18 NG/ML
TROPONIN: 0.2 NG/ML
TROPONIN: 0.22 NG/ML
TROPONIN: 0.22 NG/ML
TROPONIN: 0.23 NG/ML
TROPONIN: 0.23 NG/ML
TROPONIN: 0.25 NG/ML
TSH REFLEX FT4: 5.09 UIU/ML (ref 0.27–4.2)
TSH REFLEX: 5.87 UIU/ML (ref 0.27–4.2)
TSH SERPL DL<=0.05 MIU/L-ACNC: 4.3 UIU/ML (ref 0.27–4.2)
UREA NITROGEN, UR: 321 MG/DL (ref 800–1666)
URIC ACID, SERUM: 13 MG/DL (ref 3.5–7.2)
URIC ACID, SERUM: 13.3 MG/DL (ref 3.5–7.2)
URIC ACID, SERUM: 14.6 MG/DL (ref 3.5–7.2)
URIC ACID, SERUM: 15.2 MG/DL (ref 3.5–7.2)
URINE CULTURE, ROUTINE: ABNORMAL
URINE CULTURE, ROUTINE: ABNORMAL
URINE CULTURE, ROUTINE: NORMAL
URINE CULTURE, ROUTINE: NORMAL
URINE REFLEX TO CULTURE: YES
URINE REFLEX TO CULTURE: YES
URINE TYPE: ABNORMAL
UROBILINOGEN, URINE: 0.2 E.U./DL
VA %PRED: NORMAL
VA PRED: NORMAL
VA: NORMAL
VACUOLATED NEUTROPHILS: PRESENT
VACUOLATED NEUTROPHILS: PRESENT
VANCOMYCIN RANDOM: 10.4 UG/ML
VANCOMYCIN RANDOM: 11.4 UG/ML
VANCOMYCIN RANDOM: 12 UG/ML
VANCOMYCIN RANDOM: 12.6 UG/ML
VANCOMYCIN RANDOM: 15.4 UG/ML
VANCOMYCIN RANDOM: 17 UG/ML
VANCOMYCIN RANDOM: 17.3 UG/ML
VTG %PRED: NORMAL
VTG PRED: NORMAL
VTG: NORMAL
WBC # BLD: 10 K/UL (ref 4–11)
WBC # BLD: 10.2 K/UL (ref 4–11)
WBC # BLD: 10.6 K/UL (ref 4–11)
WBC # BLD: 10.7 K/UL (ref 4–11)
WBC # BLD: 10.8 K/UL (ref 4–11)
WBC # BLD: 11.7 K/UL (ref 4–11)
WBC # BLD: 12.1 K/UL (ref 4–11)
WBC # BLD: 12.3 K/UL (ref 4–11)
WBC # BLD: 12.5 K/UL (ref 4–11)
WBC # BLD: 12.8 K/UL (ref 4–11)
WBC # BLD: 13.1 K/UL (ref 4–11)
WBC # BLD: 13.6 K/UL (ref 4–11)
WBC # BLD: 13.7 K/UL (ref 4–11)
WBC # BLD: 14.5 K/UL (ref 4–11)
WBC # BLD: 17.1 K/UL (ref 4–11)
WBC # BLD: 18.4 K/UL (ref 4–11)
WBC # BLD: 20.5 K/UL (ref 4–11)
WBC # BLD: 21 K/UL (ref 4–11)
WBC # BLD: 28.6 K/UL (ref 4–11)
WBC # BLD: 36.4 K/UL (ref 4–11)
WBC # BLD: 36.7 K/UL (ref 4–11)
WBC # BLD: 6.9 K/UL (ref 4–11)
WBC # BLD: 7.3 K/UL (ref 4–11)
WBC # BLD: 7.7 K/UL (ref 4–11)
WBC # BLD: 7.9 K/UL (ref 4–11)
WBC # BLD: 8.3 K/UL (ref 4–11)
WBC # BLD: 8.7 K/UL (ref 4–11)
WBC # BLD: 8.9 K/UL (ref 4–11)
WBC # BLD: 9 K/UL (ref 4–11)
WBC # BLD: 9.2 K/UL (ref 4–11)
WBC # BLD: 9.3 K/UL (ref 4–11)
WBC # BLD: 9.4 K/UL (ref 4–11)
WBC UA: >100 /HPF (ref 0–5)
WBC UA: >100 /HPF (ref 0–5)
WBC UA: ABNORMAL /HPF (ref 0–5)
WOUND/ABSCESS: ABNORMAL
WOUND/ABSCESS: ABNORMAL

## 2021-01-01 PROCEDURE — 2060000000 HC ICU INTERMEDIATE R&B

## 2021-01-01 PROCEDURE — 2580000003 HC RX 258: Performed by: INTERNAL MEDICINE

## 2021-01-01 PROCEDURE — 1036F TOBACCO NON-USER: CPT | Performed by: SURGERY

## 2021-01-01 PROCEDURE — 2700000000 HC OXYGEN THERAPY PER DAY

## 2021-01-01 PROCEDURE — 6370000000 HC RX 637 (ALT 250 FOR IP): Performed by: INTERNAL MEDICINE

## 2021-01-01 PROCEDURE — 84484 ASSAY OF TROPONIN QUANT: CPT

## 2021-01-01 PROCEDURE — 99233 SBSQ HOSP IP/OBS HIGH 50: CPT | Performed by: INTERNAL MEDICINE

## 2021-01-01 PROCEDURE — 6360000002 HC RX W HCPCS: Performed by: INTERNAL MEDICINE

## 2021-01-01 PROCEDURE — 94761 N-INVAS EAR/PLS OXIMETRY MLT: CPT

## 2021-01-01 PROCEDURE — 2720000010 HC SURG SUPPLY STERILE: Performed by: SURGERY

## 2021-01-01 PROCEDURE — C9113 INJ PANTOPRAZOLE SODIUM, VIA: HCPCS | Performed by: INTERNAL MEDICINE

## 2021-01-01 PROCEDURE — 6370000000 HC RX 637 (ALT 250 FOR IP): Performed by: PHYSICIAN ASSISTANT

## 2021-01-01 PROCEDURE — U0005 INFEC AGEN DETEC AMPLI PROBE: HCPCS

## 2021-01-01 PROCEDURE — 80076 HEPATIC FUNCTION PANEL: CPT

## 2021-01-01 PROCEDURE — C1894 INTRO/SHEATH, NON-LASER: HCPCS

## 2021-01-01 PROCEDURE — 2500000003 HC RX 250 WO HCPCS: Performed by: INTERNAL MEDICINE

## 2021-01-01 PROCEDURE — 36592 COLLECT BLOOD FROM PICC: CPT

## 2021-01-01 PROCEDURE — 7100000000 HC PACU RECOVERY - FIRST 15 MIN: Performed by: INTERNAL MEDICINE

## 2021-01-01 PROCEDURE — 85025 COMPLETE CBC W/AUTO DIFF WBC: CPT

## 2021-01-01 PROCEDURE — 7100000001 HC PACU RECOVERY - ADDTL 15 MIN: Performed by: SURGERY

## 2021-01-01 PROCEDURE — 80053 COMPREHEN METABOLIC PANEL: CPT

## 2021-01-01 PROCEDURE — 94669 MECHANICAL CHEST WALL OSCILL: CPT

## 2021-01-01 PROCEDURE — 99291 CRITICAL CARE FIRST HOUR: CPT | Performed by: INTERNAL MEDICINE

## 2021-01-01 PROCEDURE — 36591 DRAW BLOOD OFF VENOUS DEVICE: CPT

## 2021-01-01 PROCEDURE — 97165 OT EVAL LOW COMPLEX 30 MIN: CPT

## 2021-01-01 PROCEDURE — 3017F COLORECTAL CA SCREEN DOC REV: CPT | Performed by: INTERNAL MEDICINE

## 2021-01-01 PROCEDURE — 82330 ASSAY OF CALCIUM: CPT

## 2021-01-01 PROCEDURE — G8417 CALC BMI ABV UP PARAM F/U: HCPCS | Performed by: SURGERY

## 2021-01-01 PROCEDURE — 97161 PT EVAL LOW COMPLEX 20 MIN: CPT

## 2021-01-01 PROCEDURE — 83605 ASSAY OF LACTIC ACID: CPT

## 2021-01-01 PROCEDURE — 85730 THROMBOPLASTIN TIME PARTIAL: CPT

## 2021-01-01 PROCEDURE — 99214 OFFICE O/P EST MOD 30 MIN: CPT

## 2021-01-01 PROCEDURE — 94003 VENT MGMT INPAT SUBQ DAY: CPT

## 2021-01-01 PROCEDURE — 99283 EMERGENCY DEPT VISIT LOW MDM: CPT

## 2021-01-01 PROCEDURE — 71045 X-RAY EXAM CHEST 1 VIEW: CPT

## 2021-01-01 PROCEDURE — G8417 CALC BMI ABV UP PARAM F/U: HCPCS | Performed by: INTERNAL MEDICINE

## 2021-01-01 PROCEDURE — 93005 ELECTROCARDIOGRAM TRACING: CPT | Performed by: INTERNAL MEDICINE

## 2021-01-01 PROCEDURE — 81001 URINALYSIS AUTO W/SCOPE: CPT

## 2021-01-01 PROCEDURE — 82570 ASSAY OF URINE CREATININE: CPT

## 2021-01-01 PROCEDURE — 83735 ASSAY OF MAGNESIUM: CPT

## 2021-01-01 PROCEDURE — 6360000002 HC RX W HCPCS: Performed by: PHYSICIAN ASSISTANT

## 2021-01-01 PROCEDURE — 2000000000 HC ICU R&B

## 2021-01-01 PROCEDURE — 2580000003 HC RX 258: Performed by: EMERGENCY MEDICINE

## 2021-01-01 PROCEDURE — 2709999900 HC NON-CHARGEABLE SUPPLY: Performed by: INTERNAL MEDICINE

## 2021-01-01 PROCEDURE — 84145 PROCALCITONIN (PCT): CPT

## 2021-01-01 PROCEDURE — 97112 NEUROMUSCULAR REEDUCATION: CPT

## 2021-01-01 PROCEDURE — 94640 AIRWAY INHALATION TREATMENT: CPT

## 2021-01-01 PROCEDURE — 36415 COLL VENOUS BLD VENIPUNCTURE: CPT

## 2021-01-01 PROCEDURE — 17250 CHEM CAUT OF GRANLTJ TISSUE: CPT

## 2021-01-01 PROCEDURE — 99212 OFFICE O/P EST SF 10 MIN: CPT | Performed by: INTERNAL MEDICINE

## 2021-01-01 PROCEDURE — 74018 RADEX ABDOMEN 1 VIEW: CPT

## 2021-01-01 PROCEDURE — 87040 BLOOD CULTURE FOR BACTERIA: CPT

## 2021-01-01 PROCEDURE — 1111F DSCHRG MED/CURRENT MED MERGE: CPT | Performed by: INTERNAL MEDICINE

## 2021-01-01 PROCEDURE — 83690 ASSAY OF LIPASE: CPT

## 2021-01-01 PROCEDURE — 99292 CRITICAL CARE ADDL 30 MIN: CPT | Performed by: INTERNAL MEDICINE

## 2021-01-01 PROCEDURE — 80069 RENAL FUNCTION PANEL: CPT

## 2021-01-01 PROCEDURE — 80202 ASSAY OF VANCOMYCIN: CPT

## 2021-01-01 PROCEDURE — 17250 CHEM CAUT OF GRANLTJ TISSUE: CPT | Performed by: INTERNAL MEDICINE

## 2021-01-01 PROCEDURE — 2580000003 HC RX 258: Performed by: PHYSICIAN ASSISTANT

## 2021-01-01 PROCEDURE — 97597 DBRDMT OPN WND 1ST 20 CM/<: CPT | Performed by: INTERNAL MEDICINE

## 2021-01-01 PROCEDURE — 5A1D70Z PERFORMANCE OF URINARY FILTRATION, INTERMITTENT, LESS THAN 6 HOURS PER DAY: ICD-10-PCS | Performed by: INTERNAL MEDICINE

## 2021-01-01 PROCEDURE — 1036F TOBACCO NON-USER: CPT | Performed by: INTERNAL MEDICINE

## 2021-01-01 PROCEDURE — 87340 HEPATITIS B SURFACE AG IA: CPT

## 2021-01-01 PROCEDURE — 31500 INSERT EMERGENCY AIRWAY: CPT | Performed by: INTERNAL MEDICINE

## 2021-01-01 PROCEDURE — 87635 SARS-COV-2 COVID-19 AMP PRB: CPT

## 2021-01-01 PROCEDURE — 74176 CT ABD & PELVIS W/O CONTRAST: CPT

## 2021-01-01 PROCEDURE — 90935 HEMODIALYSIS ONE EVALUATION: CPT

## 2021-01-01 PROCEDURE — 94729 DIFFUSING CAPACITY: CPT

## 2021-01-01 PROCEDURE — 3609015200 HC ERCP REMOVE CALCULI/DEBRIS BILIARY/PANCREAS DUCT: Performed by: INTERNAL MEDICINE

## 2021-01-01 PROCEDURE — 99232 SBSQ HOSP IP/OBS MODERATE 35: CPT | Performed by: INTERNAL MEDICINE

## 2021-01-01 PROCEDURE — 83880 ASSAY OF NATRIURETIC PEPTIDE: CPT

## 2021-01-01 PROCEDURE — 82803 BLOOD GASES ANY COMBINATION: CPT

## 2021-01-01 PROCEDURE — 0JH63XZ INSERTION OF TUNNELED VASCULAR ACCESS DEVICE INTO CHEST SUBCUTANEOUS TISSUE AND FASCIA, PERCUTANEOUS APPROACH: ICD-10-PCS | Performed by: INTERNAL MEDICINE

## 2021-01-01 PROCEDURE — 97110 THERAPEUTIC EXERCISES: CPT

## 2021-01-01 PROCEDURE — 6360000002 HC RX W HCPCS

## 2021-01-01 PROCEDURE — 93010 ELECTROCARDIOGRAM REPORT: CPT | Performed by: INTERNAL MEDICINE

## 2021-01-01 PROCEDURE — 87205 SMEAR GRAM STAIN: CPT

## 2021-01-01 PROCEDURE — 77001 FLUOROGUIDE FOR VEIN DEVICE: CPT

## 2021-01-01 PROCEDURE — G8427 DOCREV CUR MEDS BY ELIG CLIN: HCPCS | Performed by: INTERNAL MEDICINE

## 2021-01-01 PROCEDURE — P9047 ALBUMIN (HUMAN), 25%, 50ML: HCPCS | Performed by: INTERNAL MEDICINE

## 2021-01-01 PROCEDURE — 11730 AVULSION NAIL PLATE SIMPLE 1: CPT

## 2021-01-01 PROCEDURE — 83036 HEMOGLOBIN GLYCOSYLATED A1C: CPT

## 2021-01-01 PROCEDURE — 82550 ASSAY OF CK (CPK): CPT

## 2021-01-01 PROCEDURE — 2500000003 HC RX 250 WO HCPCS: Performed by: NURSE ANESTHETIST, CERTIFIED REGISTERED

## 2021-01-01 PROCEDURE — 99239 HOSP IP/OBS DSCHRG MGMT >30: CPT | Performed by: INTERNAL MEDICINE

## 2021-01-01 PROCEDURE — 87070 CULTURE OTHR SPECIMN AEROBIC: CPT

## 2021-01-01 PROCEDURE — C1729 CATH, DRAINAGE: HCPCS

## 2021-01-01 PROCEDURE — 2500000003 HC RX 250 WO HCPCS: Performed by: STUDENT IN AN ORGANIZED HEALTH CARE EDUCATION/TRAINING PROGRAM

## 2021-01-01 PROCEDURE — 87636 SARSCOV2 & INF A&B AMP PRB: CPT

## 2021-01-01 PROCEDURE — 87086 URINE CULTURE/COLONY COUNT: CPT

## 2021-01-01 PROCEDURE — 97530 THERAPEUTIC ACTIVITIES: CPT

## 2021-01-01 PROCEDURE — 80048 BASIC METABOLIC PNL TOTAL CA: CPT

## 2021-01-01 PROCEDURE — 99284 EMERGENCY DEPT VISIT MOD MDM: CPT

## 2021-01-01 PROCEDURE — 97597 DBRDMT OPN WND 1ST 20 CM/<: CPT

## 2021-01-01 PROCEDURE — 84439 ASSAY OF FREE THYROXINE: CPT

## 2021-01-01 PROCEDURE — 10060 I&D ABSCESS SIMPLE/SINGLE: CPT

## 2021-01-01 PROCEDURE — 96374 THER/PROPH/DIAG INJ IV PUSH: CPT

## 2021-01-01 PROCEDURE — 6360000002 HC RX W HCPCS: Performed by: RADIOLOGY

## 2021-01-01 PROCEDURE — 93005 ELECTROCARDIOGRAM TRACING: CPT | Performed by: NURSE PRACTITIONER

## 2021-01-01 PROCEDURE — 84100 ASSAY OF PHOSPHORUS: CPT

## 2021-01-01 PROCEDURE — 84540 ASSAY OF URINE/UREA-N: CPT

## 2021-01-01 PROCEDURE — 99153 MOD SED SAME PHYS/QHP EA: CPT

## 2021-01-01 PROCEDURE — XW033N5 INTRODUCTION OF MEROPENEM-VABORBACTAM ANTI-INFECTIVE INTO PERIPHERAL VEIN, PERCUTANEOUS APPROACH, NEW TECHNOLOGY GROUP 5: ICD-10-PCS | Performed by: STUDENT IN AN ORGANIZED HEALTH CARE EDUCATION/TRAINING PROGRAM

## 2021-01-01 PROCEDURE — 99221 1ST HOSP IP/OBS SF/LOW 40: CPT | Performed by: INTERNAL MEDICINE

## 2021-01-01 PROCEDURE — 93005 ELECTROCARDIOGRAM TRACING: CPT | Performed by: PHYSICIAN ASSISTANT

## 2021-01-01 PROCEDURE — 2500000003 HC RX 250 WO HCPCS: Performed by: SURGERY

## 2021-01-01 PROCEDURE — 99213 OFFICE O/P EST LOW 20 MIN: CPT | Performed by: INTERNAL MEDICINE

## 2021-01-01 PROCEDURE — 2580000003 HC RX 258: Performed by: NURSE ANESTHETIST, CERTIFIED REGISTERED

## 2021-01-01 PROCEDURE — 85610 PROTHROMBIN TIME: CPT

## 2021-01-01 PROCEDURE — 31500 INSERT EMERGENCY AIRWAY: CPT

## 2021-01-01 PROCEDURE — 99232 SBSQ HOSP IP/OBS MODERATE 35: CPT | Performed by: PHYSICIAN ASSISTANT

## 2021-01-01 PROCEDURE — 3609018800 HC ERCP DX COLLECTION SPECIMEN BRUSHING/WASHING: Performed by: INTERNAL MEDICINE

## 2021-01-01 PROCEDURE — 51702 INSERT TEMP BLADDER CATH: CPT

## 2021-01-01 PROCEDURE — 10060 I&D ABSCESS SIMPLE/SINGLE: CPT | Performed by: INTERNAL MEDICINE

## 2021-01-01 PROCEDURE — 90945 DIALYSIS ONE EVALUATION: CPT

## 2021-01-01 PROCEDURE — 99214 OFFICE O/P EST MOD 30 MIN: CPT | Performed by: INTERNAL MEDICINE

## 2021-01-01 PROCEDURE — 92526 ORAL FUNCTION THERAPY: CPT

## 2021-01-01 PROCEDURE — 92610 EVALUATE SWALLOWING FUNCTION: CPT

## 2021-01-01 PROCEDURE — 0DJW4ZZ INSPECTION OF PERITONEUM, PERCUTANEOUS ENDOSCOPIC APPROACH: ICD-10-PCS | Performed by: SURGERY

## 2021-01-01 PROCEDURE — G8484 FLU IMMUNIZE NO ADMIN: HCPCS | Performed by: INTERNAL MEDICINE

## 2021-01-01 PROCEDURE — 97602 WOUND(S) CARE NON-SELECTIVE: CPT

## 2021-01-01 PROCEDURE — 7100000010 HC PHASE II RECOVERY - FIRST 15 MIN: Performed by: SURGERY

## 2021-01-01 PROCEDURE — 84300 ASSAY OF URINE SODIUM: CPT

## 2021-01-01 PROCEDURE — 97602 WOUND(S) CARE NON-SELECTIVE: CPT | Performed by: INTERNAL MEDICINE

## 2021-01-01 PROCEDURE — 3017F COLORECTAL CA SCREEN DOC REV: CPT | Performed by: SURGERY

## 2021-01-01 PROCEDURE — 96361 HYDRATE IV INFUSION ADD-ON: CPT

## 2021-01-01 PROCEDURE — 1111F DSCHRG MED/CURRENT MED MERGE: CPT | Performed by: SURGERY

## 2021-01-01 PROCEDURE — 51798 US URINE CAPACITY MEASURE: CPT

## 2021-01-01 PROCEDURE — 94002 VENT MGMT INPAT INIT DAY: CPT

## 2021-01-01 PROCEDURE — 3609014900 HC ERCP W/SPHINCTEROTOMY &/OR PAPILLOTOMY: Performed by: INTERNAL MEDICINE

## 2021-01-01 PROCEDURE — 74181 MRI ABDOMEN W/O CONTRAST: CPT

## 2021-01-01 PROCEDURE — 6360000002 HC RX W HCPCS: Performed by: STUDENT IN AN ORGANIZED HEALTH CARE EDUCATION/TRAINING PROGRAM

## 2021-01-01 PROCEDURE — 2580000003 HC RX 258: Performed by: NURSE PRACTITIONER

## 2021-01-01 PROCEDURE — 94060 EVALUATION OF WHEEZING: CPT

## 2021-01-01 PROCEDURE — G8427 DOCREV CUR MEDS BY ELIG CLIN: HCPCS | Performed by: SURGERY

## 2021-01-01 PROCEDURE — 3700000000 HC ANESTHESIA ATTENDED CARE: Performed by: INTERNAL MEDICINE

## 2021-01-01 PROCEDURE — 84133 ASSAY OF URINE POTASSIUM: CPT

## 2021-01-01 PROCEDURE — 99223 1ST HOSP IP/OBS HIGH 75: CPT | Performed by: INTERNAL MEDICINE

## 2021-01-01 PROCEDURE — 02HV33Z INSERTION OF INFUSION DEVICE INTO SUPERIOR VENA CAVA, PERCUTANEOUS APPROACH: ICD-10-PCS | Performed by: INTERNAL MEDICINE

## 2021-01-01 PROCEDURE — 76770 US EXAM ABDO BACK WALL COMP: CPT

## 2021-01-01 PROCEDURE — 87150 DNA/RNA AMPLIFIED PROBE: CPT

## 2021-01-01 PROCEDURE — 86704 HEP B CORE ANTIBODY TOTAL: CPT

## 2021-01-01 PROCEDURE — 84550 ASSAY OF BLOOD/URIC ACID: CPT

## 2021-01-01 PROCEDURE — 36558 INSERT TUNNELED CV CATH: CPT

## 2021-01-01 PROCEDURE — P9047 ALBUMIN (HUMAN), 25%, 50ML: HCPCS

## 2021-01-01 PROCEDURE — 93005 ELECTROCARDIOGRAM TRACING: CPT | Performed by: STUDENT IN AN ORGANIZED HEALTH CARE EDUCATION/TRAINING PROGRAM

## 2021-01-01 PROCEDURE — U0003 INFECTIOUS AGENT DETECTION BY NUCLEIC ACID (DNA OR RNA); SEVERE ACUTE RESPIRATORY SYNDROME CORONAVIRUS 2 (SARS-COV-2) (CORONAVIRUS DISEASE [COVID-19]), AMPLIFIED PROBE TECHNIQUE, MAKING USE OF HIGH THROUGHPUT TECHNOLOGIES AS DESCRIBED BY CMS-2020-01-R: HCPCS

## 2021-01-01 PROCEDURE — 86901 BLOOD TYPING SEROLOGIC RH(D): CPT

## 2021-01-01 PROCEDURE — 2580000003 HC RX 258: Performed by: SURGERY

## 2021-01-01 PROCEDURE — 87077 CULTURE AEROBIC IDENTIFY: CPT

## 2021-01-01 PROCEDURE — 7100000001 HC PACU RECOVERY - ADDTL 15 MIN: Performed by: INTERNAL MEDICINE

## 2021-01-01 PROCEDURE — C1750 CATH, HEMODIALYSIS,LONG-TERM: HCPCS

## 2021-01-01 PROCEDURE — 6370000000 HC RX 637 (ALT 250 FOR IP): Performed by: NURSE PRACTITIONER

## 2021-01-01 PROCEDURE — 84156 ASSAY OF PROTEIN URINE: CPT

## 2021-01-01 PROCEDURE — 6360000002 HC RX W HCPCS: Performed by: NURSE PRACTITIONER

## 2021-01-01 PROCEDURE — 70450 CT HEAD/BRAIN W/O DYE: CPT

## 2021-01-01 PROCEDURE — 2580000003 HC RX 258: Performed by: ANESTHESIOLOGY

## 2021-01-01 PROCEDURE — 99214 OFFICE O/P EST MOD 30 MIN: CPT | Performed by: SURGERY

## 2021-01-01 PROCEDURE — 2580000003 HC RX 258: Performed by: STUDENT IN AN ORGANIZED HEALTH CARE EDUCATION/TRAINING PROGRAM

## 2021-01-01 PROCEDURE — 5A1945Z RESPIRATORY VENTILATION, 24-96 CONSECUTIVE HOURS: ICD-10-PCS | Performed by: INTERNAL MEDICINE

## 2021-01-01 PROCEDURE — 6360000002 HC RX W HCPCS: Performed by: SURGERY

## 2021-01-01 PROCEDURE — 84443 ASSAY THYROID STIM HORMONE: CPT

## 2021-01-01 PROCEDURE — 99222 1ST HOSP IP/OBS MODERATE 55: CPT | Performed by: NURSE PRACTITIONER

## 2021-01-01 PROCEDURE — 99223 1ST HOSP IP/OBS HIGH 75: CPT | Performed by: SURGERY

## 2021-01-01 PROCEDURE — 86850 RBC ANTIBODY SCREEN: CPT

## 2021-01-01 PROCEDURE — 86900 BLOOD TYPING SEROLOGIC ABO: CPT

## 2021-01-01 PROCEDURE — 7100000011 HC PHASE II RECOVERY - ADDTL 15 MIN: Performed by: SURGERY

## 2021-01-01 PROCEDURE — 2500000003 HC RX 250 WO HCPCS

## 2021-01-01 PROCEDURE — 7100000000 HC PACU RECOVERY - FIRST 15 MIN: Performed by: SURGERY

## 2021-01-01 PROCEDURE — 99239 HOSP IP/OBS DSCHRG MGMT >30: CPT | Performed by: PHYSICIAN ASSISTANT

## 2021-01-01 PROCEDURE — 99213 OFFICE O/P EST LOW 20 MIN: CPT

## 2021-01-01 PROCEDURE — 74330 X-RAY BILE/PANC ENDOSCOPY: CPT

## 2021-01-01 PROCEDURE — 0FJB8ZZ INSPECTION OF HEPATOBILIARY DUCT, VIA NATURAL OR ARTIFICIAL OPENING ENDOSCOPIC: ICD-10-PCS | Performed by: INTERNAL MEDICINE

## 2021-01-01 PROCEDURE — 87186 SC STD MICRODIL/AGAR DIL: CPT

## 2021-01-01 PROCEDURE — 85027 COMPLETE CBC AUTOMATED: CPT

## 2021-01-01 PROCEDURE — 6370000000 HC RX 637 (ALT 250 FOR IP): Performed by: HOSPITALIST

## 2021-01-01 PROCEDURE — 83930 ASSAY OF BLOOD OSMOLALITY: CPT

## 2021-01-01 PROCEDURE — 0FC98ZZ EXTIRPATION OF MATTER FROM COMMON BILE DUCT, VIA NATURAL OR ARTIFICIAL OPENING ENDOSCOPIC: ICD-10-PCS | Performed by: INTERNAL MEDICINE

## 2021-01-01 PROCEDURE — 3600000004 HC SURGERY LEVEL 4 BASE: Performed by: SURGERY

## 2021-01-01 PROCEDURE — 6360000002 HC RX W HCPCS: Performed by: EMERGENCY MEDICINE

## 2021-01-01 PROCEDURE — 6360000002 HC RX W HCPCS: Performed by: NURSE ANESTHETIST, CERTIFIED REGISTERED

## 2021-01-01 PROCEDURE — 11730 AVULSION NAIL PLATE SIMPLE 1: CPT | Performed by: INTERNAL MEDICINE

## 2021-01-01 PROCEDURE — 3700000001 HC ADD 15 MINUTES (ANESTHESIA): Performed by: SURGERY

## 2021-01-01 PROCEDURE — 82436 ASSAY OF URINE CHLORIDE: CPT

## 2021-01-01 PROCEDURE — 02H633Z INSERTION OF INFUSION DEVICE INTO RIGHT ATRIUM, PERCUTANEOUS APPROACH: ICD-10-PCS | Performed by: INTERNAL MEDICINE

## 2021-01-01 PROCEDURE — 96375 TX/PRO/DX INJ NEW DRUG ADDON: CPT

## 2021-01-01 PROCEDURE — 0BH17EZ INSERTION OF ENDOTRACHEAL AIRWAY INTO TRACHEA, VIA NATURAL OR ARTIFICIAL OPENING: ICD-10-PCS | Performed by: INTERNAL MEDICINE

## 2021-01-01 PROCEDURE — 36600 WITHDRAWAL OF ARTERIAL BLOOD: CPT

## 2021-01-01 PROCEDURE — 99285 EMERGENCY DEPT VISIT HI MDM: CPT

## 2021-01-01 PROCEDURE — 3700000001 HC ADD 15 MINUTES (ANESTHESIA): Performed by: INTERNAL MEDICINE

## 2021-01-01 PROCEDURE — 49320 DIAG LAPARO SEPARATE PROC: CPT | Performed by: SURGERY

## 2021-01-01 PROCEDURE — 3044F HG A1C LEVEL LT 7.0%: CPT | Performed by: INTERNAL MEDICINE

## 2021-01-01 PROCEDURE — 76937 US GUIDE VASCULAR ACCESS: CPT

## 2021-01-01 PROCEDURE — 99204 OFFICE O/P NEW MOD 45 MIN: CPT | Performed by: INTERNAL MEDICINE

## 2021-01-01 PROCEDURE — 99152 MOD SED SAME PHYS/QHP 5/>YRS: CPT

## 2021-01-01 PROCEDURE — 36556 INSERT NON-TUNNEL CV CATH: CPT

## 2021-01-01 PROCEDURE — 3600000014 HC SURGERY LEVEL 4 ADDTL 15MIN: Performed by: SURGERY

## 2021-01-01 PROCEDURE — 94727 GAS DIL/WSHOT DETER LNG VOL: CPT

## 2021-01-01 PROCEDURE — 85049 AUTOMATED PLATELET COUNT: CPT

## 2021-01-01 PROCEDURE — 3700000000 HC ANESTHESIA ATTENDED CARE: Performed by: SURGERY

## 2021-01-01 PROCEDURE — 94760 N-INVAS EAR/PLS OXIMETRY 1: CPT

## 2021-01-01 PROCEDURE — 2709999900 HC NON-CHARGEABLE SUPPLY: Performed by: SURGERY

## 2021-01-01 PROCEDURE — 2022F DILAT RTA XM EVC RTNOPTHY: CPT | Performed by: INTERNAL MEDICINE

## 2021-01-01 PROCEDURE — 86706 HEP B SURFACE ANTIBODY: CPT

## 2021-01-01 PROCEDURE — BF101ZZ FLUOROSCOPY OF BILE DUCTS USING LOW OSMOLAR CONTRAST: ICD-10-PCS | Performed by: INTERNAL MEDICINE

## 2021-01-01 PROCEDURE — 6370000000 HC RX 637 (ALT 250 FOR IP): Performed by: STUDENT IN AN ORGANIZED HEALTH CARE EDUCATION/TRAINING PROGRAM

## 2021-01-01 PROCEDURE — 0F9430Z DRAINAGE OF GALLBLADDER WITH DRAINAGE DEVICE, PERCUTANEOUS APPROACH: ICD-10-PCS | Performed by: INTERNAL MEDICINE

## 2021-01-01 RX ORDER — LIDOCAINE HYDROCHLORIDE 40 MG/ML
SOLUTION TOPICAL ONCE
Status: CANCELLED | OUTPATIENT
Start: 2021-01-01 | End: 2021-01-01

## 2021-01-01 RX ORDER — EPINEPHRINE 1 MG/ML
INJECTION, SOLUTION, CONCENTRATE INTRAVENOUS
Status: COMPLETED | OUTPATIENT
Start: 2021-01-01 | End: 2021-01-01

## 2021-01-01 RX ORDER — BUDESONIDE AND FORMOTEROL FUMARATE DIHYDRATE 160; 4.5 UG/1; UG/1
2 AEROSOL RESPIRATORY (INHALATION) 2 TIMES DAILY
Status: DISCONTINUED | OUTPATIENT
Start: 2021-01-01 | End: 2021-01-01 | Stop reason: HOSPADM

## 2021-01-01 RX ORDER — TORSEMIDE 20 MG/1
40 TABLET ORAL DAILY
Qty: 60 TABLET | Refills: 0 | Status: ON HOLD
Start: 2021-01-01 | End: 2021-01-01 | Stop reason: SDUPTHER

## 2021-01-01 RX ORDER — SODIUM CHLORIDE 0.9 % (FLUSH) 0.9 %
5-40 SYRINGE (ML) INJECTION EVERY 12 HOURS SCHEDULED
Status: DISCONTINUED | OUTPATIENT
Start: 2021-01-01 | End: 2021-01-01 | Stop reason: HOSPADM

## 2021-01-01 RX ORDER — METOPROLOL SUCCINATE 100 MG/1
TABLET, EXTENDED RELEASE ORAL
Qty: 90 TABLET | Refills: 0 | Status: SHIPPED | OUTPATIENT
Start: 2021-01-01 | End: 2021-01-01

## 2021-01-01 RX ORDER — LIDOCAINE 40 MG/G
CREAM TOPICAL ONCE
Status: DISCONTINUED | OUTPATIENT
Start: 2021-01-01 | End: 2021-01-01 | Stop reason: HOSPADM

## 2021-01-01 RX ORDER — MONTELUKAST SODIUM 10 MG/1
10 TABLET ORAL DAILY
Qty: 30 TABLET | Refills: 3 | Status: SHIPPED | OUTPATIENT
Start: 2021-01-01 | End: 2021-01-01

## 2021-01-01 RX ORDER — FUROSEMIDE 10 MG/ML
40 INJECTION INTRAMUSCULAR; INTRAVENOUS ONCE
Status: DISCONTINUED | OUTPATIENT
Start: 2021-01-01 | End: 2021-01-01

## 2021-01-01 RX ORDER — INSULIN GLARGINE 100 [IU]/ML
15 INJECTION, SOLUTION SUBCUTANEOUS 2 TIMES DAILY
Status: DISCONTINUED | OUTPATIENT
Start: 2021-01-01 | End: 2021-01-01 | Stop reason: HOSPADM

## 2021-01-01 RX ORDER — LIDOCAINE HYDROCHLORIDE 10 MG/ML
1 INJECTION, SOLUTION EPIDURAL; INFILTRATION; INTRACAUDAL; PERINEURAL
Status: DISCONTINUED | OUTPATIENT
Start: 2021-01-01 | End: 2021-01-01 | Stop reason: HOSPADM

## 2021-01-01 RX ORDER — MAGNESIUM SULFATE 1 G/100ML
1000 INJECTION INTRAVENOUS PRN
Status: DISCONTINUED | OUTPATIENT
Start: 2021-01-01 | End: 2021-01-01 | Stop reason: HOSPADM

## 2021-01-01 RX ORDER — TRIAMCINOLONE ACETONIDE 1 MG/G
CREAM TOPICAL
Qty: 45 G | Refills: 1 | Status: SHIPPED | OUTPATIENT
Start: 2021-01-01

## 2021-01-01 RX ORDER — POTASSIUM CHLORIDE 29.8 MG/ML
20 INJECTION INTRAVENOUS
Status: COMPLETED | OUTPATIENT
Start: 2021-01-01 | End: 2021-01-01

## 2021-01-01 RX ORDER — HEPARIN SODIUM 5000 [USP'U]/ML
5000 INJECTION, SOLUTION INTRAVENOUS; SUBCUTANEOUS ONCE
Status: DISCONTINUED | OUTPATIENT
Start: 2021-01-01 | End: 2021-01-01 | Stop reason: HOSPADM

## 2021-01-01 RX ORDER — POTASSIUM CHLORIDE 29.8 MG/ML
100 INJECTION INTRAVENOUS ONCE
Status: DISCONTINUED | OUTPATIENT
Start: 2021-01-01 | End: 2021-01-01

## 2021-01-01 RX ORDER — LIDOCAINE 40 MG/G
CREAM TOPICAL ONCE
Status: CANCELLED | OUTPATIENT
Start: 2021-01-01 | End: 2021-01-01

## 2021-01-01 RX ORDER — 0.9 % SODIUM CHLORIDE 0.9 %
1000 INTRAVENOUS SOLUTION INTRAVENOUS ONCE
Status: COMPLETED | OUTPATIENT
Start: 2021-01-01 | End: 2021-01-01

## 2021-01-01 RX ORDER — MAGNESIUM SULFATE 1 G/100ML
1000 INJECTION INTRAVENOUS PRN
Status: DISCONTINUED | OUTPATIENT
Start: 2021-01-01 | End: 2021-01-01 | Stop reason: ALTCHOICE

## 2021-01-01 RX ORDER — MONTELUKAST SODIUM 10 MG/1
10 TABLET ORAL DAILY
Status: DISCONTINUED | OUTPATIENT
Start: 2021-01-01 | End: 2021-01-01 | Stop reason: HOSPADM

## 2021-01-01 RX ORDER — INSULIN GLARGINE 100 [IU]/ML
20 INJECTION, SOLUTION SUBCUTANEOUS 2 TIMES DAILY
Status: DISCONTINUED | OUTPATIENT
Start: 2021-01-01 | End: 2021-01-01

## 2021-01-01 RX ORDER — NEBULIZER ACCESSORIES
1 KIT MISCELLANEOUS DAILY PRN
Qty: 1 KIT | Refills: 11 | Status: SHIPPED | OUTPATIENT
Start: 2021-01-01

## 2021-01-01 RX ORDER — NITROGLYCERIN 0.4 MG/1
TABLET SUBLINGUAL
Qty: 25 TABLET | Refills: 3 | Status: SHIPPED | OUTPATIENT
Start: 2021-01-01

## 2021-01-01 RX ORDER — INSULIN GLARGINE 100 [IU]/ML
26 INJECTION, SOLUTION SUBCUTANEOUS 2 TIMES DAILY
Status: DISCONTINUED | OUTPATIENT
Start: 2021-01-01 | End: 2021-01-01

## 2021-01-01 RX ORDER — ONDANSETRON 2 MG/ML
4 INJECTION INTRAMUSCULAR; INTRAVENOUS EVERY 6 HOURS PRN
Status: DISCONTINUED | OUTPATIENT
Start: 2021-01-01 | End: 2021-01-01 | Stop reason: HOSPADM

## 2021-01-01 RX ORDER — FUROSEMIDE 10 MG/ML
40 INJECTION INTRAMUSCULAR; INTRAVENOUS ONCE
Status: COMPLETED | OUTPATIENT
Start: 2021-01-01 | End: 2021-01-01

## 2021-01-01 RX ORDER — HEPARIN SODIUM 10000 [USP'U]/100ML
900 INJECTION, SOLUTION INTRAVENOUS CONTINUOUS
Status: DISCONTINUED | OUTPATIENT
Start: 2021-01-01 | End: 2021-01-01

## 2021-01-01 RX ORDER — OXYCODONE HYDROCHLORIDE 5 MG/1
5 TABLET ORAL EVERY 6 HOURS PRN
Status: DISCONTINUED | OUTPATIENT
Start: 2021-01-01 | End: 2021-01-01 | Stop reason: HOSPADM

## 2021-01-01 RX ORDER — TORSEMIDE 20 MG/1
40 TABLET ORAL DAILY
Qty: 120 TABLET | Refills: 0
Start: 2021-01-01 | End: 2021-01-01 | Stop reason: SDUPTHER

## 2021-01-01 RX ORDER — PROMETHAZINE HYDROCHLORIDE 25 MG/ML
6.25 INJECTION, SOLUTION INTRAMUSCULAR; INTRAVENOUS
Status: DISCONTINUED | OUTPATIENT
Start: 2021-01-01 | End: 2021-01-01 | Stop reason: HOSPADM

## 2021-01-01 RX ORDER — SODIUM CHLORIDE 0.9 % (FLUSH) 0.9 %
5-40 SYRINGE (ML) INJECTION PRN
Status: DISCONTINUED | OUTPATIENT
Start: 2021-01-01 | End: 2021-01-01 | Stop reason: HOSPADM

## 2021-01-01 RX ORDER — POTASSIUM CHLORIDE 20 MEQ/1
40 TABLET, EXTENDED RELEASE ORAL
Status: COMPLETED | OUTPATIENT
Start: 2021-01-01 | End: 2021-01-01

## 2021-01-01 RX ORDER — ALBUTEROL SULFATE 2.5 MG/3ML
2.5 SOLUTION RESPIRATORY (INHALATION) 3 TIMES DAILY
Status: DISCONTINUED | OUTPATIENT
Start: 2021-01-01 | End: 2021-01-01

## 2021-01-01 RX ORDER — ACETAMINOPHEN 500 MG
1000 TABLET ORAL ONCE
Status: COMPLETED | OUTPATIENT
Start: 2021-01-01 | End: 2021-01-01

## 2021-01-01 RX ORDER — SODIUM CHLORIDE, SODIUM LACTATE, POTASSIUM CHLORIDE, CALCIUM CHLORIDE 600; 310; 30; 20 MG/100ML; MG/100ML; MG/100ML; MG/100ML
INJECTION, SOLUTION INTRAVENOUS CONTINUOUS
Status: DISCONTINUED | OUTPATIENT
Start: 2021-01-01 | End: 2021-01-01 | Stop reason: HOSPADM

## 2021-01-01 RX ORDER — ACETAMINOPHEN 650 MG/1
650 SUPPOSITORY RECTAL EVERY 6 HOURS PRN
Status: DISCONTINUED | OUTPATIENT
Start: 2021-01-01 | End: 2021-01-01 | Stop reason: HOSPADM

## 2021-01-01 RX ORDER — METOPROLOL SUCCINATE 25 MG/1
12.5 TABLET, EXTENDED RELEASE ORAL DAILY
Status: DISCONTINUED | OUTPATIENT
Start: 2021-01-01 | End: 2021-01-01

## 2021-01-01 RX ORDER — PEAK FLOW METER
EACH MISCELLANEOUS
Qty: 1 DEVICE | Refills: 0 | Status: SHIPPED | OUTPATIENT
Start: 2021-01-01

## 2021-01-01 RX ORDER — CYCLOBENZAPRINE HCL 10 MG
10 TABLET ORAL 2 TIMES DAILY PRN
Status: DISCONTINUED | OUTPATIENT
Start: 2021-01-01 | End: 2021-01-01 | Stop reason: HOSPADM

## 2021-01-01 RX ORDER — HYDRALAZINE HYDROCHLORIDE 20 MG/ML
5 INJECTION INTRAMUSCULAR; INTRAVENOUS EVERY 10 MIN PRN
Status: DISCONTINUED | OUTPATIENT
Start: 2021-01-01 | End: 2021-01-01 | Stop reason: HOSPADM

## 2021-01-01 RX ORDER — HEPARIN SODIUM 10000 [USP'U]/100ML
400 INJECTION, SOLUTION INTRAVENOUS CONTINUOUS
Status: DISCONTINUED | OUTPATIENT
Start: 2021-01-01 | End: 2021-01-01

## 2021-01-01 RX ORDER — NICOTINE POLACRILEX 4 MG
15 LOZENGE BUCCAL PRN
Status: DISCONTINUED | OUTPATIENT
Start: 2021-01-01 | End: 2021-01-01 | Stop reason: HOSPADM

## 2021-01-01 RX ORDER — LIDOCAINE 50 MG/G
OINTMENT TOPICAL ONCE
Status: CANCELLED | OUTPATIENT
Start: 2021-01-01 | End: 2021-01-01

## 2021-01-01 RX ORDER — MONTELUKAST SODIUM 10 MG/1
10 TABLET ORAL NIGHTLY
Status: DISCONTINUED | OUTPATIENT
Start: 2021-01-01 | End: 2021-01-01 | Stop reason: HOSPADM

## 2021-01-01 RX ORDER — ACETAMINOPHEN 325 MG/1
650 TABLET ORAL EVERY 6 HOURS PRN
Status: DISCONTINUED | OUTPATIENT
Start: 2021-01-01 | End: 2021-01-01 | Stop reason: HOSPADM

## 2021-01-01 RX ORDER — POTASSIUM CHLORIDE 20 MEQ/1
40 TABLET, EXTENDED RELEASE ORAL ONCE
Status: DISCONTINUED | OUTPATIENT
Start: 2021-01-01 | End: 2021-01-01

## 2021-01-01 RX ORDER — FUROSEMIDE 20 MG/1
20 TABLET ORAL 2 TIMES DAILY
Status: DISCONTINUED | OUTPATIENT
Start: 2021-01-01 | End: 2021-01-01

## 2021-01-01 RX ORDER — LINEZOLID 2 MG/ML
600 INJECTION, SOLUTION INTRAVENOUS EVERY 12 HOURS
Status: DISCONTINUED | OUTPATIENT
Start: 2021-01-01 | End: 2021-01-01

## 2021-01-01 RX ORDER — METOPROLOL SUCCINATE 100 MG/1
100 TABLET, EXTENDED RELEASE ORAL DAILY
Qty: 90 TABLET | Refills: 3 | Status: ON HOLD
Start: 2021-01-01 | End: 2021-01-01 | Stop reason: SDUPTHER

## 2021-01-01 RX ORDER — ACETAMINOPHEN 10 MG/ML
1000 INJECTION, SOLUTION INTRAVENOUS EVERY 6 HOURS PRN
Status: DISCONTINUED | OUTPATIENT
Start: 2021-01-01 | End: 2021-01-01

## 2021-01-01 RX ORDER — ASPIRIN 81 MG/1
81 TABLET, CHEWABLE ORAL NIGHTLY
Status: DISCONTINUED | OUTPATIENT
Start: 2021-01-01 | End: 2021-01-01 | Stop reason: HOSPADM

## 2021-01-01 RX ORDER — FENTANYL CITRATE 50 UG/ML
INJECTION, SOLUTION INTRAMUSCULAR; INTRAVENOUS
Status: COMPLETED | OUTPATIENT
Start: 2021-01-01 | End: 2021-01-01

## 2021-01-01 RX ORDER — FUROSEMIDE 10 MG/ML
40 INJECTION INTRAMUSCULAR; INTRAVENOUS 2 TIMES DAILY
Status: DISCONTINUED | OUTPATIENT
Start: 2021-01-01 | End: 2021-01-01

## 2021-01-01 RX ORDER — INSULIN GLARGINE 100 [IU]/ML
10 INJECTION, SOLUTION SUBCUTANEOUS 2 TIMES DAILY
Status: DISCONTINUED | OUTPATIENT
Start: 2021-01-01 | End: 2021-01-01

## 2021-01-01 RX ORDER — ALBUTEROL SULFATE 90 UG/1
2 AEROSOL, METERED RESPIRATORY (INHALATION) 2 TIMES DAILY
Status: DISCONTINUED | OUTPATIENT
Start: 2021-01-01 | End: 2021-01-01

## 2021-01-01 RX ORDER — TORSEMIDE 20 MG/1
60 TABLET ORAL DAILY
Status: DISCONTINUED | OUTPATIENT
Start: 2021-01-01 | End: 2021-01-01

## 2021-01-01 RX ORDER — FUROSEMIDE 10 MG/ML
40 INJECTION INTRAMUSCULAR; INTRAVENOUS 3 TIMES DAILY
Status: DISCONTINUED | OUTPATIENT
Start: 2021-01-01 | End: 2021-01-01 | Stop reason: HOSPADM

## 2021-01-01 RX ORDER — ALBUMIN (HUMAN) 12.5 G/50ML
SOLUTION INTRAVENOUS
Status: COMPLETED
Start: 2021-01-01 | End: 2021-01-01

## 2021-01-01 RX ORDER — INSULIN GLARGINE 100 [IU]/ML
15 INJECTION, SOLUTION SUBCUTANEOUS NIGHTLY
Qty: 30 ML | Refills: 0
Start: 2021-01-01 | End: 2021-01-01

## 2021-01-01 RX ORDER — MONTELUKAST SODIUM 10 MG/1
10 TABLET ORAL DAILY
Status: DISCONTINUED | OUTPATIENT
Start: 2021-01-01 | End: 2021-01-01

## 2021-01-01 RX ORDER — MAGNESIUM SULFATE IN WATER 40 MG/ML
2000 INJECTION, SOLUTION INTRAVENOUS ONCE
Status: COMPLETED | OUTPATIENT
Start: 2021-01-01 | End: 2021-01-01

## 2021-01-01 RX ORDER — OXYCODONE HYDROCHLORIDE 5 MG/1
5 TABLET ORAL EVERY 6 HOURS PRN
Qty: 20 TABLET | Refills: 0
Start: 2021-01-01 | End: 2021-01-01

## 2021-01-01 RX ORDER — SODIUM CHLORIDE 9 MG/ML
25 INJECTION, SOLUTION INTRAVENOUS PRN
Status: DISCONTINUED | OUTPATIENT
Start: 2021-01-01 | End: 2021-01-01 | Stop reason: HOSPADM

## 2021-01-01 RX ORDER — ALBUMIN (HUMAN) 12.5 G/50ML
25 SOLUTION INTRAVENOUS PRN
Status: DISCONTINUED | OUTPATIENT
Start: 2021-01-01 | End: 2021-01-01 | Stop reason: HOSPADM

## 2021-01-01 RX ORDER — SODIUM CHLORIDE 0.9 % (FLUSH) 0.9 %
10 SYRINGE (ML) INJECTION EVERY 12 HOURS SCHEDULED
Status: DISCONTINUED | OUTPATIENT
Start: 2021-01-01 | End: 2021-01-01 | Stop reason: HOSPADM

## 2021-01-01 RX ORDER — PROMETHAZINE HYDROCHLORIDE 25 MG/ML
INJECTION, SOLUTION INTRAMUSCULAR; INTRAVENOUS
Status: COMPLETED
Start: 2021-01-01 | End: 2021-01-01

## 2021-01-01 RX ORDER — HEPARIN SODIUM 1000 [USP'U]/ML
2600 INJECTION, SOLUTION INTRAVENOUS; SUBCUTANEOUS PRN
Status: DISCONTINUED | OUTPATIENT
Start: 2021-01-01 | End: 2021-01-01 | Stop reason: HOSPADM

## 2021-01-01 RX ORDER — DOCUSATE SODIUM 100 MG/1
200 CAPSULE, LIQUID FILLED ORAL DAILY
Status: DISCONTINUED | OUTPATIENT
Start: 2021-01-01 | End: 2021-01-01 | Stop reason: HOSPADM

## 2021-01-01 RX ORDER — VANCOMYCIN HYDROCHLORIDE 1 G/20ML
INJECTION, POWDER, LYOPHILIZED, FOR SOLUTION INTRAVENOUS
Status: DISCONTINUED
Start: 2021-01-01 | End: 2021-01-01 | Stop reason: WASHOUT

## 2021-01-01 RX ORDER — TRAZODONE HYDROCHLORIDE 50 MG/1
50 TABLET ORAL NIGHTLY PRN
Status: DISCONTINUED | OUTPATIENT
Start: 2021-01-01 | End: 2021-01-01 | Stop reason: HOSPADM

## 2021-01-01 RX ORDER — NITROGLYCERIN 0.4 MG/1
0.4 TABLET SUBLINGUAL EVERY 5 MIN PRN
Status: DISCONTINUED | OUTPATIENT
Start: 2021-01-01 | End: 2021-01-01 | Stop reason: HOSPADM

## 2021-01-01 RX ORDER — ONDANSETRON 4 MG/1
4 TABLET, ORALLY DISINTEGRATING ORAL EVERY 8 HOURS PRN
Status: DISCONTINUED | OUTPATIENT
Start: 2021-01-01 | End: 2021-01-01 | Stop reason: HOSPADM

## 2021-01-01 RX ORDER — METOPROLOL SUCCINATE 50 MG/1
50 TABLET, EXTENDED RELEASE ORAL 2 TIMES DAILY
Status: DISCONTINUED | OUTPATIENT
Start: 2021-01-01 | End: 2021-01-01 | Stop reason: HOSPADM

## 2021-01-01 RX ORDER — SODIUM CHLORIDE 9 MG/ML
25 INJECTION, SOLUTION INTRAVENOUS PRN
Status: DISCONTINUED | OUTPATIENT
Start: 2021-01-01 | End: 2021-01-01 | Stop reason: SDUPTHER

## 2021-01-01 RX ORDER — CIPROFLOXACIN 500 MG/1
500 TABLET, FILM COATED ORAL EVERY 12 HOURS SCHEDULED
Status: DISCONTINUED | OUTPATIENT
Start: 2021-01-01 | End: 2021-01-01 | Stop reason: HOSPADM

## 2021-01-01 RX ORDER — PREDNISOLONE ACETATE 10 MG/ML
1 SUSPENSION/ DROPS OPHTHALMIC
COMMUNITY
Start: 2020-08-28 | End: 2021-01-01

## 2021-01-01 RX ORDER — MAGNESIUM SULFATE IN WATER 40 MG/ML
4000 INJECTION, SOLUTION INTRAVENOUS ONCE
Status: COMPLETED | OUTPATIENT
Start: 2021-01-01 | End: 2021-01-01

## 2021-01-01 RX ORDER — ACETAMINOPHEN 10 MG/ML
1000 INJECTION, SOLUTION INTRAVENOUS ONCE
Status: COMPLETED | OUTPATIENT
Start: 2021-01-01 | End: 2021-01-01

## 2021-01-01 RX ORDER — ALBUMIN (HUMAN) 12.5 G/50ML
25 SOLUTION INTRAVENOUS EVERY 6 HOURS
Status: DISCONTINUED | OUTPATIENT
Start: 2021-01-01 | End: 2021-01-01

## 2021-01-01 RX ORDER — MIDAZOLAM HYDROCHLORIDE 5 MG/ML
INJECTION INTRAMUSCULAR; INTRAVENOUS
Status: COMPLETED | OUTPATIENT
Start: 2021-01-01 | End: 2021-01-01

## 2021-01-01 RX ORDER — ACETAMINOPHEN 500 MG
TABLET ORAL
Status: DISCONTINUED
Start: 2021-01-01 | End: 2021-01-01 | Stop reason: WASHOUT

## 2021-01-01 RX ORDER — HEPARIN SODIUM 1000 [USP'U]/ML
3000 INJECTION, SOLUTION INTRAVENOUS; SUBCUTANEOUS PRN
Status: DISCONTINUED | OUTPATIENT
Start: 2021-01-01 | End: 2021-01-01 | Stop reason: HOSPADM

## 2021-01-01 RX ORDER — 0.9 % SODIUM CHLORIDE 0.9 %
500 INTRAVENOUS SOLUTION INTRAVENOUS ONCE
Status: COMPLETED | OUTPATIENT
Start: 2021-01-01 | End: 2021-01-01

## 2021-01-01 RX ORDER — FLUTICASONE FUROATE AND VILANTEROL TRIFENATATE 100; 25 UG/1; UG/1
1 POWDER RESPIRATORY (INHALATION) DAILY
Qty: 3 EACH | Refills: 0 | Status: SHIPPED | OUTPATIENT
Start: 2021-01-01 | End: 2021-01-01

## 2021-01-01 RX ORDER — HEPARIN SODIUM 1000 [USP'U]/ML
4000 INJECTION, SOLUTION INTRAVENOUS; SUBCUTANEOUS PRN
Status: DISCONTINUED | OUTPATIENT
Start: 2021-01-01 | End: 2021-01-01 | Stop reason: HOSPADM

## 2021-01-01 RX ORDER — POTASSIUM CHLORIDE 20 MEQ/1
40 TABLET, EXTENDED RELEASE ORAL
Status: DISCONTINUED | OUTPATIENT
Start: 2021-01-01 | End: 2021-01-01 | Stop reason: HOSPADM

## 2021-01-01 RX ORDER — HEPARIN SODIUM 1000 [USP'U]/ML
2000 INJECTION, SOLUTION INTRAVENOUS; SUBCUTANEOUS ONCE
Status: COMPLETED | OUTPATIENT
Start: 2021-01-01 | End: 2021-01-01

## 2021-01-01 RX ORDER — TORSEMIDE 20 MG/1
TABLET ORAL
Qty: 120 TABLET | Refills: 0 | Status: ON HOLD | OUTPATIENT
Start: 2021-01-01 | End: 2021-01-01 | Stop reason: SDUPTHER

## 2021-01-01 RX ORDER — HEPARIN SODIUM 10000 [USP'U]/100ML
1480 INJECTION, SOLUTION INTRAVENOUS CONTINUOUS
Status: DISCONTINUED | OUTPATIENT
Start: 2021-01-01 | End: 2021-01-01

## 2021-01-01 RX ORDER — ACETAMINOPHEN 325 MG/1
TABLET ORAL
Status: DISPENSED
Start: 2021-01-01 | End: 2021-01-01

## 2021-01-01 RX ORDER — INSULIN GLARGINE 100 [IU]/ML
60 INJECTION, SOLUTION SUBCUTANEOUS 2 TIMES DAILY
Status: DISCONTINUED | OUTPATIENT
Start: 2021-01-01 | End: 2021-01-01

## 2021-01-01 RX ORDER — HEPARIN SODIUM 1000 [USP'U]/ML
3000 INJECTION, SOLUTION INTRAVENOUS; SUBCUTANEOUS ONCE
Status: DISCONTINUED | OUTPATIENT
Start: 2021-01-01 | End: 2021-01-01

## 2021-01-01 RX ORDER — BENZONATATE 100 MG/1
100 CAPSULE ORAL 3 TIMES DAILY PRN
Status: DISCONTINUED | OUTPATIENT
Start: 2021-01-01 | End: 2021-01-01 | Stop reason: HOSPADM

## 2021-01-01 RX ORDER — ALBUTEROL SULFATE 2.5 MG/3ML
2.5 SOLUTION RESPIRATORY (INHALATION) EVERY 6 HOURS PRN
Qty: 120 EACH | Refills: 5 | Status: ON HOLD
Start: 2021-01-01 | End: 2021-01-01 | Stop reason: HOSPADM

## 2021-01-01 RX ORDER — POTASSIUM CHLORIDE 20 MEQ/1
20 TABLET, EXTENDED RELEASE ORAL DAILY
Status: DISCONTINUED | OUTPATIENT
Start: 2021-01-01 | End: 2021-01-01 | Stop reason: HOSPADM

## 2021-01-01 RX ORDER — SODIUM CHLORIDE 0.9 % (FLUSH) 0.9 %
10 SYRINGE (ML) INJECTION EVERY 12 HOURS SCHEDULED
Status: DISCONTINUED | OUTPATIENT
Start: 2021-01-01 | End: 2021-01-01 | Stop reason: SDUPTHER

## 2021-01-01 RX ORDER — TORSEMIDE 20 MG/1
80 TABLET ORAL DAILY
Qty: 90 TABLET | Refills: 0 | Status: SHIPPED | OUTPATIENT
Start: 2021-01-01 | End: 2021-01-01

## 2021-01-01 RX ORDER — TRAZODONE HYDROCHLORIDE 50 MG/1
TABLET ORAL
Qty: 90 TABLET | Refills: 1 | Status: SHIPPED | OUTPATIENT
Start: 2021-01-01 | End: 2022-01-01

## 2021-01-01 RX ORDER — INSULIN GLARGINE 100 [IU]/ML
60 INJECTION, SOLUTION SUBCUTANEOUS 2 TIMES DAILY
Qty: 5 PEN | Refills: 1 | Status: SHIPPED
Start: 2021-01-01 | End: 2022-01-01

## 2021-01-01 RX ORDER — MIDODRINE HYDROCHLORIDE 10 MG/1
10 TABLET ORAL
Qty: 90 TABLET | Refills: 3 | Status: SHIPPED | OUTPATIENT
Start: 2021-01-01 | End: 2021-01-01

## 2021-01-01 RX ORDER — ALBUMIN (HUMAN) 12.5 G/50ML
25 SOLUTION INTRAVENOUS ONCE
Status: COMPLETED | OUTPATIENT
Start: 2021-01-01 | End: 2021-01-01

## 2021-01-01 RX ORDER — TORSEMIDE 20 MG/1
TABLET ORAL
Qty: 90 TABLET | Refills: 0 | Status: SHIPPED
Start: 2021-01-01 | End: 2021-01-01 | Stop reason: HOSPADM

## 2021-01-01 RX ORDER — HEPARIN SODIUM 5000 [USP'U]/ML
5000 INJECTION, SOLUTION INTRAVENOUS; SUBCUTANEOUS ONCE
Status: CANCELLED | OUTPATIENT
Start: 2021-01-01

## 2021-01-01 RX ORDER — 0.9 % SODIUM CHLORIDE 0.9 %
250 INTRAVENOUS SOLUTION INTRAVENOUS PRN
Status: DISCONTINUED | OUTPATIENT
Start: 2021-01-01 | End: 2021-01-01 | Stop reason: HOSPADM

## 2021-01-01 RX ORDER — TORSEMIDE 20 MG/1
80 TABLET ORAL DAILY
Qty: 120 TABLET | Refills: 0 | Status: SHIPPED | OUTPATIENT
Start: 2021-01-01 | End: 2021-01-01

## 2021-01-01 RX ORDER — ALBUTEROL SULFATE 2.5 MG/3ML
2.5 SOLUTION RESPIRATORY (INHALATION) EVERY 4 HOURS PRN
Status: DISCONTINUED | OUTPATIENT
Start: 2021-01-01 | End: 2021-01-01 | Stop reason: HOSPADM

## 2021-01-01 RX ORDER — INSULIN GLARGINE 100 [IU]/ML
15 INJECTION, SOLUTION SUBCUTANEOUS NIGHTLY
Status: DISCONTINUED | OUTPATIENT
Start: 2021-01-01 | End: 2021-01-01 | Stop reason: HOSPADM

## 2021-01-01 RX ORDER — INSULIN GLARGINE 100 [IU]/ML
60 INJECTION, SOLUTION SUBCUTANEOUS 2 TIMES DAILY
Status: DISCONTINUED | OUTPATIENT
Start: 2021-01-01 | End: 2021-01-01 | Stop reason: HOSPADM

## 2021-01-01 RX ORDER — SODIUM CHLORIDE 0.9 % (FLUSH) 0.9 %
10 SYRINGE (ML) INJECTION PRN
Status: DISCONTINUED | OUTPATIENT
Start: 2021-01-01 | End: 2021-01-01 | Stop reason: HOSPADM

## 2021-01-01 RX ORDER — FUROSEMIDE 10 MG/ML
100 INJECTION INTRAMUSCULAR; INTRAVENOUS ONCE
Status: COMPLETED | OUTPATIENT
Start: 2021-01-01 | End: 2021-01-01

## 2021-01-01 RX ORDER — DEXTROSE MONOHYDRATE 50 MG/ML
100 INJECTION, SOLUTION INTRAVENOUS PRN
Status: DISCONTINUED | OUTPATIENT
Start: 2021-01-01 | End: 2021-01-01 | Stop reason: HOSPADM

## 2021-01-01 RX ORDER — 0.9 % SODIUM CHLORIDE 0.9 %
250 INTRAVENOUS SOLUTION INTRAVENOUS ONCE
Status: COMPLETED | OUTPATIENT
Start: 2021-01-01 | End: 2021-01-01

## 2021-01-01 RX ORDER — INSULIN GLARGINE 100 [IU]/ML
15 INJECTION, SOLUTION SUBCUTANEOUS 2 TIMES DAILY
Status: DISCONTINUED | OUTPATIENT
Start: 2021-01-01 | End: 2021-01-01

## 2021-01-01 RX ORDER — ONDANSETRON 2 MG/ML
INJECTION INTRAMUSCULAR; INTRAVENOUS PRN
Status: DISCONTINUED | OUTPATIENT
Start: 2021-01-01 | End: 2021-01-01 | Stop reason: SDUPTHER

## 2021-01-01 RX ORDER — FERROUS SULFATE 325(65) MG
325 TABLET ORAL
Status: DISCONTINUED | OUTPATIENT
Start: 2021-01-01 | End: 2021-01-01 | Stop reason: HOSPADM

## 2021-01-01 RX ORDER — DEXTROSE MONOHYDRATE 25 G/50ML
12.5 INJECTION, SOLUTION INTRAVENOUS PRN
Status: DISCONTINUED | OUTPATIENT
Start: 2021-01-01 | End: 2021-01-01 | Stop reason: HOSPADM

## 2021-01-01 RX ORDER — GUAIFENESIN 600 MG/1
600 TABLET, EXTENDED RELEASE ORAL 2 TIMES DAILY
Status: DISCONTINUED | OUTPATIENT
Start: 2021-01-01 | End: 2021-01-01 | Stop reason: HOSPADM

## 2021-01-01 RX ORDER — ALBUTEROL SULFATE 90 UG/1
2 AEROSOL, METERED RESPIRATORY (INHALATION) 4 TIMES DAILY PRN
Qty: 1 INHALER | Refills: 5 | Status: SHIPPED | OUTPATIENT
Start: 2021-01-01 | End: 2021-01-01 | Stop reason: SDUPTHER

## 2021-01-01 RX ORDER — ERYTHROMYCIN 5 MG/G
OINTMENT OPHTHALMIC
COMMUNITY
Start: 2020-09-03 | End: 2021-01-01

## 2021-01-01 RX ORDER — OXYCODONE HYDROCHLORIDE 5 MG/1
5 TABLET ORAL EVERY 6 HOURS PRN
Qty: 20 TABLET | Refills: 0 | Status: ON HOLD | OUTPATIENT
Start: 2021-01-01 | End: 2021-01-01

## 2021-01-01 RX ORDER — HEPARIN SODIUM 10000 [USP'U]/100ML
110 INJECTION, SOLUTION INTRAVENOUS CONTINUOUS
Status: DISCONTINUED | OUTPATIENT
Start: 2021-01-01 | End: 2021-01-01

## 2021-01-01 RX ORDER — BUDESONIDE AND FORMOTEROL FUMARATE DIHYDRATE 160; 4.5 UG/1; UG/1
2 AEROSOL RESPIRATORY (INHALATION) 2 TIMES DAILY
Status: DISCONTINUED | OUTPATIENT
Start: 2021-01-01 | End: 2021-01-01

## 2021-01-01 RX ORDER — PANTOPRAZOLE SODIUM 40 MG/1
40 TABLET, DELAYED RELEASE ORAL DAILY
Status: DISCONTINUED | OUTPATIENT
Start: 2021-01-01 | End: 2021-01-01 | Stop reason: HOSPADM

## 2021-01-01 RX ORDER — INSULIN GLARGINE 100 [IU]/ML
25 INJECTION, SOLUTION SUBCUTANEOUS 2 TIMES DAILY
Status: DISCONTINUED | OUTPATIENT
Start: 2021-01-01 | End: 2021-01-01

## 2021-01-01 RX ORDER — METHYL SALICYLATE/MENTHOL
CREAM (GRAM) TOPICAL PRN
COMMUNITY

## 2021-01-01 RX ORDER — OXYCODONE HYDROCHLORIDE AND ACETAMINOPHEN 5; 325 MG/1; MG/1
1 TABLET ORAL PRN
Status: DISCONTINUED | OUTPATIENT
Start: 2021-01-01 | End: 2021-01-01 | Stop reason: HOSPADM

## 2021-01-01 RX ORDER — POTASSIUM CHLORIDE 20 MEQ/1
20 TABLET, EXTENDED RELEASE ORAL 2 TIMES DAILY
Status: DISCONTINUED | OUTPATIENT
Start: 2021-01-01 | End: 2021-01-01

## 2021-01-01 RX ORDER — MEPERIDINE HYDROCHLORIDE 25 MG/ML
12.5 INJECTION INTRAMUSCULAR; INTRAVENOUS; SUBCUTANEOUS EVERY 5 MIN PRN
Status: DISCONTINUED | OUTPATIENT
Start: 2021-01-01 | End: 2021-01-01 | Stop reason: HOSPADM

## 2021-01-01 RX ORDER — INSULIN GLARGINE 100 [IU]/ML
10 INJECTION, SOLUTION SUBCUTANEOUS ONCE
Status: COMPLETED | OUTPATIENT
Start: 2021-01-01 | End: 2021-01-01

## 2021-01-01 RX ORDER — CIPROFLOXACIN 500 MG/1
500 TABLET, FILM COATED ORAL EVERY 12 HOURS SCHEDULED
Qty: 14 TABLET | Refills: 0 | Status: SHIPPED | OUTPATIENT
Start: 2021-01-01 | End: 2022-01-01

## 2021-01-01 RX ORDER — MIDAZOLAM HYDROCHLORIDE 1 MG/ML
2 INJECTION INTRAMUSCULAR; INTRAVENOUS
Status: DISCONTINUED | OUTPATIENT
Start: 2021-01-01 | End: 2021-01-01

## 2021-01-01 RX ORDER — MIDAZOLAM HYDROCHLORIDE 1 MG/ML
INJECTION INTRAMUSCULAR; INTRAVENOUS PRN
Status: DISCONTINUED | OUTPATIENT
Start: 2021-01-01 | End: 2021-01-01 | Stop reason: SDUPTHER

## 2021-01-01 RX ORDER — SODIUM CHLORIDE 9 MG/ML
25 INJECTION, SOLUTION INTRAVENOUS PRN
Status: CANCELLED | OUTPATIENT
Start: 2021-01-01

## 2021-01-01 RX ORDER — ALBUTEROL SULFATE 90 UG/1
4 AEROSOL, METERED RESPIRATORY (INHALATION) ONCE
Status: COMPLETED | OUTPATIENT
Start: 2021-01-01 | End: 2021-01-01

## 2021-01-01 RX ORDER — INSULIN GLARGINE 100 [IU]/ML
55 INJECTION, SOLUTION SUBCUTANEOUS 2 TIMES DAILY
Qty: 5 PEN | Refills: 1 | Status: ON HOLD | OUTPATIENT
Start: 2021-01-01 | End: 2021-01-01 | Stop reason: SDUPTHER

## 2021-01-01 RX ORDER — ONDANSETRON 2 MG/ML
4 INJECTION INTRAMUSCULAR; INTRAVENOUS
Status: DISCONTINUED | OUTPATIENT
Start: 2021-01-01 | End: 2021-01-01 | Stop reason: HOSPADM

## 2021-01-01 RX ORDER — OXYCODONE HYDROCHLORIDE AND ACETAMINOPHEN 5; 325 MG/1; MG/1
2 TABLET ORAL PRN
Status: DISCONTINUED | OUTPATIENT
Start: 2021-01-01 | End: 2021-01-01 | Stop reason: HOSPADM

## 2021-01-01 RX ORDER — POTASSIUM CHLORIDE 20 MEQ/1
40 TABLET, EXTENDED RELEASE ORAL
Status: DISPENSED | OUTPATIENT
Start: 2021-01-01 | End: 2021-01-01

## 2021-01-01 RX ORDER — HEPARIN SODIUM 1000 [USP'U]/ML
INJECTION, SOLUTION INTRAVENOUS; SUBCUTANEOUS
Status: DISPENSED
Start: 2021-01-01 | End: 2021-01-01

## 2021-01-01 RX ORDER — SODIUM CHLORIDE 0.9 % (FLUSH) 0.9 %
5-40 SYRINGE (ML) INJECTION EVERY 12 HOURS SCHEDULED
Status: CANCELLED | OUTPATIENT
Start: 2021-01-01

## 2021-01-01 RX ORDER — ALBUTEROL SULFATE 2.5 MG/3ML
2.5 SOLUTION RESPIRATORY (INHALATION) EVERY 6 HOURS PRN
Status: DISCONTINUED | OUTPATIENT
Start: 2021-01-01 | End: 2021-01-01

## 2021-01-01 RX ORDER — POTASSIUM CHLORIDE 29.8 MG/ML
20 INJECTION INTRAVENOUS PRN
Status: DISCONTINUED | OUTPATIENT
Start: 2021-01-01 | End: 2021-01-01 | Stop reason: ALTCHOICE

## 2021-01-01 RX ORDER — POTASSIUM CHLORIDE 20 MEQ/1
20 TABLET, EXTENDED RELEASE ORAL 3 TIMES DAILY
Status: DISCONTINUED | OUTPATIENT
Start: 2021-01-01 | End: 2021-01-01

## 2021-01-01 RX ORDER — POTASSIUM CHLORIDE 7.45 MG/ML
10 INJECTION INTRAVENOUS PRN
Status: DISCONTINUED | OUTPATIENT
Start: 2021-01-01 | End: 2021-01-01 | Stop reason: HOSPADM

## 2021-01-01 RX ORDER — INSULIN GLARGINE 100 [IU]/ML
55 INJECTION, SOLUTION SUBCUTANEOUS 2 TIMES DAILY
Status: CANCELLED | OUTPATIENT
Start: 2021-01-01

## 2021-01-01 RX ORDER — POTASSIUM CHLORIDE 20 MEQ/1
40 TABLET, EXTENDED RELEASE ORAL PRN
Status: DISCONTINUED | OUTPATIENT
Start: 2021-01-01 | End: 2021-01-01 | Stop reason: HOSPADM

## 2021-01-01 RX ORDER — GLYCOPYRROLATE 0.2 MG/ML
INJECTION INTRAMUSCULAR; INTRAVENOUS PRN
Status: DISCONTINUED | OUTPATIENT
Start: 2021-01-01 | End: 2021-01-01 | Stop reason: SDUPTHER

## 2021-01-01 RX ORDER — HEPARIN SODIUM 1000 [USP'U]/ML
3000 INJECTION, SOLUTION INTRAVENOUS; SUBCUTANEOUS PRN
Status: DISCONTINUED | OUTPATIENT
Start: 2021-01-01 | End: 2021-01-01

## 2021-01-01 RX ORDER — HEPARIN SODIUM 1000 [USP'U]/ML
2000 INJECTION, SOLUTION INTRAVENOUS; SUBCUTANEOUS PRN
Status: DISCONTINUED | OUTPATIENT
Start: 2021-01-01 | End: 2021-01-01 | Stop reason: HOSPADM

## 2021-01-01 RX ORDER — FUROSEMIDE 10 MG/ML
80 INJECTION INTRAMUSCULAR; INTRAVENOUS 2 TIMES DAILY
Status: DISCONTINUED | OUTPATIENT
Start: 2021-01-01 | End: 2021-01-01 | Stop reason: HOSPADM

## 2021-01-01 RX ORDER — SODIUM CHLORIDE 9 MG/ML
50 INJECTION, SOLUTION INTRAVENOUS ONCE
Status: COMPLETED | OUTPATIENT
Start: 2021-01-01 | End: 2021-01-01

## 2021-01-01 RX ORDER — LIDOCAINE HYDROCHLORIDE 20 MG/ML
INJECTION, SOLUTION EPIDURAL; INFILTRATION; INTRACAUDAL; PERINEURAL PRN
Status: DISCONTINUED | OUTPATIENT
Start: 2021-01-01 | End: 2021-01-01 | Stop reason: SDUPTHER

## 2021-01-01 RX ORDER — DOCUSATE SODIUM 100 MG/1
100 CAPSULE, LIQUID FILLED ORAL 2 TIMES DAILY
Status: DISCONTINUED | OUTPATIENT
Start: 2021-01-01 | End: 2021-01-01 | Stop reason: HOSPADM

## 2021-01-01 RX ORDER — ALLOPURINOL 100 MG/1
100 TABLET ORAL DAILY
Status: DISCONTINUED | OUTPATIENT
Start: 2021-01-01 | End: 2021-01-01 | Stop reason: HOSPADM

## 2021-01-01 RX ORDER — CETIRIZINE HYDROCHLORIDE 10 MG/1
10 TABLET ORAL DAILY
Status: DISCONTINUED | OUTPATIENT
Start: 2021-01-01 | End: 2021-01-01

## 2021-01-01 RX ORDER — PANTOPRAZOLE SODIUM 40 MG/1
40 TABLET, DELAYED RELEASE ORAL
Status: DISCONTINUED | OUTPATIENT
Start: 2021-01-01 | End: 2021-01-01 | Stop reason: HOSPADM

## 2021-01-01 RX ORDER — INSULIN GLARGINE 100 [IU]/ML
35 INJECTION, SOLUTION SUBCUTANEOUS 2 TIMES DAILY
Status: DISCONTINUED | OUTPATIENT
Start: 2021-01-01 | End: 2021-01-01

## 2021-01-01 RX ORDER — ASPIRIN 81 MG/1
81 TABLET ORAL NIGHTLY
Status: DISCONTINUED | OUTPATIENT
Start: 2021-01-01 | End: 2021-01-01 | Stop reason: HOSPADM

## 2021-01-01 RX ORDER — HEPARIN SODIUM 5000 [USP'U]/ML
5000 INJECTION, SOLUTION INTRAVENOUS; SUBCUTANEOUS ONCE
Status: COMPLETED | OUTPATIENT
Start: 2021-01-01 | End: 2021-01-01

## 2021-01-01 RX ORDER — FENTANYL CITRATE 50 UG/ML
INJECTION, SOLUTION INTRAMUSCULAR; INTRAVENOUS PRN
Status: DISCONTINUED | OUTPATIENT
Start: 2021-01-01 | End: 2021-01-01 | Stop reason: SDUPTHER

## 2021-01-01 RX ORDER — HEPARIN SODIUM 5000 [USP'U]/ML
3000 INJECTION, SOLUTION INTRAVENOUS; SUBCUTANEOUS ONCE
Status: DISCONTINUED | OUTPATIENT
Start: 2021-01-01 | End: 2021-01-01 | Stop reason: SDUPTHER

## 2021-01-01 RX ORDER — MAGNESIUM HYDROXIDE 1200 MG/15ML
LIQUID ORAL CONTINUOUS PRN
Status: COMPLETED | OUTPATIENT
Start: 2021-01-01 | End: 2021-01-01

## 2021-01-01 RX ORDER — LABETALOL HYDROCHLORIDE 5 MG/ML
5 INJECTION, SOLUTION INTRAVENOUS EVERY 10 MIN PRN
Status: DISCONTINUED | OUTPATIENT
Start: 2021-01-01 | End: 2021-01-01 | Stop reason: HOSPADM

## 2021-01-01 RX ORDER — SENNA PLUS 8.6 MG/1
2 TABLET ORAL NIGHTLY PRN
Status: DISCONTINUED | OUTPATIENT
Start: 2021-01-01 | End: 2021-01-01 | Stop reason: HOSPADM

## 2021-01-01 RX ORDER — HEPARIN SODIUM 1000 [USP'U]/ML
4000 INJECTION, SOLUTION INTRAVENOUS; SUBCUTANEOUS ONCE
Status: COMPLETED | OUTPATIENT
Start: 2021-01-01 | End: 2021-01-01

## 2021-01-01 RX ORDER — ROCURONIUM BROMIDE 10 MG/ML
INJECTION, SOLUTION INTRAVENOUS PRN
Status: DISCONTINUED | OUTPATIENT
Start: 2021-01-01 | End: 2021-01-01 | Stop reason: SDUPTHER

## 2021-01-01 RX ORDER — LIDOCAINE HYDROCHLORIDE 20 MG/ML
INJECTION, SOLUTION INFILTRATION; PERINEURAL PRN
Status: DISCONTINUED | OUTPATIENT
Start: 2021-01-01 | End: 2021-01-01 | Stop reason: SDUPTHER

## 2021-01-01 RX ORDER — SODIUM CHLORIDE, SODIUM LACTATE, POTASSIUM CHLORIDE, CALCIUM CHLORIDE 600; 310; 30; 20 MG/100ML; MG/100ML; MG/100ML; MG/100ML
INJECTION, SOLUTION INTRAVENOUS CONTINUOUS PRN
Status: DISCONTINUED | OUTPATIENT
Start: 2021-01-01 | End: 2021-01-01 | Stop reason: SDUPTHER

## 2021-01-01 RX ORDER — METOPROLOL TARTRATE 100 MG/1
100 TABLET ORAL DAILY
COMMUNITY
End: 2021-01-01 | Stop reason: ALTCHOICE

## 2021-01-01 RX ORDER — IBUPROFEN 400 MG/1
400 TABLET ORAL EVERY 6 HOURS PRN
Status: DISCONTINUED | OUTPATIENT
Start: 2021-01-01 | End: 2021-01-01

## 2021-01-01 RX ORDER — DOCUSATE SODIUM 100 MG/1
100 CAPSULE, LIQUID FILLED ORAL DAILY
Status: DISCONTINUED | OUTPATIENT
Start: 2021-01-01 | End: 2021-01-01 | Stop reason: HOSPADM

## 2021-01-01 RX ORDER — PROPOFOL 10 MG/ML
INJECTION, EMULSION INTRAVENOUS PRN
Status: DISCONTINUED | OUTPATIENT
Start: 2021-01-01 | End: 2021-01-01 | Stop reason: SDUPTHER

## 2021-01-01 RX ORDER — LEVOFLOXACIN 500 MG/1
500 TABLET, FILM COATED ORAL DAILY
Qty: 7 TABLET | Refills: 0 | Status: SHIPPED | OUTPATIENT
Start: 2021-01-01 | End: 2021-01-01

## 2021-01-01 RX ORDER — SODIUM CHLORIDE 0.9 % (FLUSH) 0.9 %
5-40 SYRINGE (ML) INJECTION PRN
Status: CANCELLED | OUTPATIENT
Start: 2021-01-01

## 2021-01-01 RX ORDER — METOPROLOL SUCCINATE 50 MG/1
50 TABLET, EXTENDED RELEASE ORAL DAILY
Status: DISCONTINUED | OUTPATIENT
Start: 2021-01-01 | End: 2021-01-01

## 2021-01-01 RX ORDER — TRAZODONE HYDROCHLORIDE 50 MG/1
50 TABLET ORAL NIGHTLY PRN
Status: DISCONTINUED | OUTPATIENT
Start: 2021-01-01 | End: 2021-01-01

## 2021-01-01 RX ORDER — TORSEMIDE 20 MG/1
TABLET ORAL
Qty: 90 TABLET | Refills: 0 | Status: ON HOLD | OUTPATIENT
Start: 2021-01-01 | End: 2021-01-01 | Stop reason: SDUPTHER

## 2021-01-01 RX ORDER — PROMETHAZINE HYDROCHLORIDE 25 MG/1
25 TABLET ORAL EVERY 6 HOURS PRN
Status: DISCONTINUED | OUTPATIENT
Start: 2021-01-01 | End: 2021-01-01 | Stop reason: HOSPADM

## 2021-01-01 RX ORDER — PROMETHAZINE HYDROCHLORIDE 25 MG/ML
12.5 INJECTION, SOLUTION INTRAMUSCULAR; INTRAVENOUS ONCE
Status: COMPLETED | OUTPATIENT
Start: 2021-01-01 | End: 2021-01-01

## 2021-01-01 RX ORDER — POTASSIUM CHLORIDE 20 MEQ/1
20 TABLET, EXTENDED RELEASE ORAL ONCE
Status: COMPLETED | OUTPATIENT
Start: 2021-01-01 | End: 2021-01-01

## 2021-01-01 RX ORDER — PROCHLORPERAZINE EDISYLATE 5 MG/ML
10 INJECTION INTRAMUSCULAR; INTRAVENOUS EVERY 6 HOURS PRN
Status: DISCONTINUED | OUTPATIENT
Start: 2021-01-01 | End: 2021-01-01

## 2021-01-01 RX ORDER — PANTOPRAZOLE SODIUM 40 MG/1
40 TABLET, DELAYED RELEASE ORAL DAILY
Qty: 90 TABLET | Refills: 1 | Status: SHIPPED | OUTPATIENT
Start: 2021-01-01 | End: 2022-01-01

## 2021-01-01 RX ORDER — DEXAMETHASONE SODIUM PHOSPHATE 4 MG/ML
INJECTION, SOLUTION INTRA-ARTICULAR; INTRALESIONAL; INTRAMUSCULAR; INTRAVENOUS; SOFT TISSUE PRN
Status: DISCONTINUED | OUTPATIENT
Start: 2021-01-01 | End: 2021-01-01 | Stop reason: SDUPTHER

## 2021-01-01 RX ORDER — ALBUTEROL SULFATE 2.5 MG/3ML
2.5 SOLUTION RESPIRATORY (INHALATION) 2 TIMES DAILY
Status: DISCONTINUED | OUTPATIENT
Start: 2021-01-01 | End: 2021-01-01 | Stop reason: HOSPADM

## 2021-01-01 RX ORDER — FENTANYL CITRATE 50 UG/ML
50 INJECTION, SOLUTION INTRAMUSCULAR; INTRAVENOUS
Status: DISCONTINUED | OUTPATIENT
Start: 2021-01-01 | End: 2021-01-01

## 2021-01-01 RX ORDER — SODIUM CHLORIDE, SODIUM LACTATE, POTASSIUM CHLORIDE, AND CALCIUM CHLORIDE .6; .31; .03; .02 G/100ML; G/100ML; G/100ML; G/100ML
IRRIGANT IRRIGATION PRN
Status: DISCONTINUED | OUTPATIENT
Start: 2021-01-01 | End: 2021-01-01 | Stop reason: ALTCHOICE

## 2021-01-01 RX ORDER — BENZONATATE 100 MG/1
200 CAPSULE ORAL 3 TIMES DAILY PRN
Qty: 90 CAPSULE | Refills: 3 | Status: ON HOLD
Start: 2021-01-01 | End: 2021-01-01 | Stop reason: HOSPADM

## 2021-01-01 RX ORDER — INSULIN GLARGINE 100 [IU]/ML
55 INJECTION, SOLUTION SUBCUTANEOUS 2 TIMES DAILY
Status: DISCONTINUED | OUTPATIENT
Start: 2021-01-01 | End: 2021-01-01

## 2021-01-01 RX ORDER — MIDODRINE HYDROCHLORIDE 5 MG/1
10 TABLET ORAL
Status: DISCONTINUED | OUTPATIENT
Start: 2021-01-01 | End: 2021-01-01 | Stop reason: HOSPADM

## 2021-01-01 RX ORDER — SODIUM CHLORIDE 0.9 % (FLUSH) 0.9 %
10 SYRINGE (ML) INJECTION PRN
Status: DISCONTINUED | OUTPATIENT
Start: 2021-01-01 | End: 2021-01-01 | Stop reason: SDUPTHER

## 2021-01-01 RX ORDER — PANTOPRAZOLE SODIUM 40 MG/1
TABLET, DELAYED RELEASE ORAL
Qty: 90 TABLET | Refills: 0 | Status: SHIPPED | OUTPATIENT
Start: 2021-01-01 | End: 2021-01-01 | Stop reason: SDUPTHER

## 2021-01-01 RX ORDER — INSULIN GLARGINE 100 [IU]/ML
30 INJECTION, SOLUTION SUBCUTANEOUS 2 TIMES DAILY
Status: DISCONTINUED | OUTPATIENT
Start: 2021-01-01 | End: 2021-01-01

## 2021-01-01 RX ORDER — GLUCAGON 1 MG/ML
1 KIT INJECTION PRN
Status: DISCONTINUED | OUTPATIENT
Start: 2021-01-01 | End: 2021-01-01 | Stop reason: HOSPADM

## 2021-01-01 RX ORDER — POLYETHYLENE GLYCOL 3350 17 G/17G
17 POWDER, FOR SOLUTION ORAL DAILY PRN
Status: DISCONTINUED | OUTPATIENT
Start: 2021-01-01 | End: 2021-01-01 | Stop reason: HOSPADM

## 2021-01-01 RX ORDER — CETIRIZINE HYDROCHLORIDE 10 MG/1
10 TABLET ORAL NIGHTLY
Status: DISCONTINUED | OUTPATIENT
Start: 2021-01-01 | End: 2021-01-01 | Stop reason: HOSPADM

## 2021-01-01 RX ORDER — FENTANYL CITRATE 50 UG/ML
25 INJECTION, SOLUTION INTRAMUSCULAR; INTRAVENOUS EVERY 5 MIN PRN
Status: DISCONTINUED | OUTPATIENT
Start: 2021-01-01 | End: 2021-01-01 | Stop reason: HOSPADM

## 2021-01-01 RX ORDER — LEVOFLOXACIN 5 MG/ML
500 INJECTION, SOLUTION INTRAVENOUS ONCE
Status: COMPLETED | OUTPATIENT
Start: 2021-01-01 | End: 2021-01-01

## 2021-01-01 RX ORDER — INSULIN GLARGINE 100 [IU]/ML
40 INJECTION, SOLUTION SUBCUTANEOUS 2 TIMES DAILY
Status: DISCONTINUED | OUTPATIENT
Start: 2021-01-01 | End: 2021-01-01 | Stop reason: HOSPADM

## 2021-01-01 RX ORDER — PROPOFOL 10 MG/ML
5-50 INJECTION, EMULSION INTRAVENOUS
Status: DISCONTINUED | OUTPATIENT
Start: 2021-01-01 | End: 2021-01-01

## 2021-01-01 RX ORDER — ALBUTEROL SULFATE 90 UG/1
2 AEROSOL, METERED RESPIRATORY (INHALATION) 4 TIMES DAILY PRN
Qty: 18 G | Refills: 5 | Status: SHIPPED | OUTPATIENT
Start: 2021-01-01

## 2021-01-01 RX ORDER — PANTOPRAZOLE SODIUM 40 MG/10ML
40 INJECTION, POWDER, LYOPHILIZED, FOR SOLUTION INTRAVENOUS DAILY
Status: DISCONTINUED | OUTPATIENT
Start: 2021-01-01 | End: 2021-01-01 | Stop reason: HOSPADM

## 2021-01-01 RX ORDER — SODIUM CHLORIDE 9 MG/ML
INJECTION, SOLUTION INTRAVENOUS CONTINUOUS
Status: DISCONTINUED | OUTPATIENT
Start: 2021-01-01 | End: 2021-01-01

## 2021-01-01 RX ORDER — INSULIN GLARGINE 100 [IU]/ML
INJECTION, SOLUTION SUBCUTANEOUS
Qty: 30 ML | Refills: 0 | Status: ON HOLD | OUTPATIENT
Start: 2021-01-01 | End: 2021-01-01 | Stop reason: SDUPTHER

## 2021-01-01 RX ORDER — FUROSEMIDE 10 MG/ML
80 INJECTION INTRAMUSCULAR; INTRAVENOUS ONCE
Status: COMPLETED | OUTPATIENT
Start: 2021-01-01 | End: 2021-01-01

## 2021-01-01 RX ORDER — HEPARIN SODIUM 1000 [USP'U]/ML
1000 INJECTION, SOLUTION INTRAVENOUS; SUBCUTANEOUS
Status: COMPLETED | OUTPATIENT
Start: 2021-01-01 | End: 2021-01-01

## 2021-01-01 RX ORDER — SENNA PLUS 8.6 MG/1
2 TABLET ORAL NIGHTLY
Status: DISCONTINUED | OUTPATIENT
Start: 2021-01-01 | End: 2021-01-01 | Stop reason: HOSPADM

## 2021-01-01 RX ORDER — INSULIN GLARGINE 100 [IU]/ML
40 INJECTION, SOLUTION SUBCUTANEOUS 2 TIMES DAILY
Status: DISCONTINUED | OUTPATIENT
Start: 2021-01-01 | End: 2021-01-01

## 2021-01-01 RX ORDER — HEPARIN SODIUM 10000 [USP'U]/100ML
10 INJECTION, SOLUTION INTRAVENOUS CONTINUOUS
Status: DISCONTINUED | OUTPATIENT
Start: 2021-01-01 | End: 2021-01-01

## 2021-01-01 RX ORDER — METOPROLOL SUCCINATE 50 MG/1
50 TABLET, EXTENDED RELEASE ORAL DAILY
Status: DISCONTINUED | OUTPATIENT
Start: 2021-01-01 | End: 2021-01-01 | Stop reason: HOSPADM

## 2021-01-01 RX ORDER — ALBUTEROL SULFATE 90 UG/1
2 AEROSOL, METERED RESPIRATORY (INHALATION) 4 TIMES DAILY PRN
Status: DISCONTINUED | OUTPATIENT
Start: 2021-01-01 | End: 2021-01-01 | Stop reason: HOSPADM

## 2021-01-01 RX ORDER — METOLAZONE 2.5 MG/1
10 TABLET ORAL ONCE
Status: COMPLETED | OUTPATIENT
Start: 2021-01-01 | End: 2021-01-01

## 2021-01-01 RX ORDER — POTASSIUM CHLORIDE 20 MEQ/1
20 TABLET, EXTENDED RELEASE ORAL DAILY
Qty: 90 TABLET | Refills: 0 | Status: ON HOLD
Start: 2021-01-01 | End: 2022-01-01 | Stop reason: HOSPADM

## 2021-01-01 RX ORDER — HEPARIN SODIUM 1000 [USP'U]/ML
3600 INJECTION, SOLUTION INTRAVENOUS; SUBCUTANEOUS PRN
Status: DISCONTINUED | OUTPATIENT
Start: 2021-01-01 | End: 2021-01-01 | Stop reason: HOSPADM

## 2021-01-01 RX ORDER — ALBUMIN (HUMAN) 12.5 G/50ML
25 SOLUTION INTRAVENOUS ONCE
Status: DISCONTINUED | OUTPATIENT
Start: 2021-01-01 | End: 2021-01-01 | Stop reason: HOSPADM

## 2021-01-01 RX ORDER — INSULIN GLARGINE 100 [IU]/ML
45 INJECTION, SOLUTION SUBCUTANEOUS 2 TIMES DAILY
Status: DISCONTINUED | OUTPATIENT
Start: 2021-01-01 | End: 2021-01-01

## 2021-01-01 RX ORDER — CETIRIZINE HYDROCHLORIDE 10 MG/1
10 TABLET ORAL DAILY
Status: DISCONTINUED | OUTPATIENT
Start: 2021-01-01 | End: 2021-01-01 | Stop reason: HOSPADM

## 2021-01-01 RX ORDER — TRAZODONE HYDROCHLORIDE 50 MG/1
50 TABLET ORAL NIGHTLY
Status: DISCONTINUED | OUTPATIENT
Start: 2021-01-01 | End: 2021-01-01 | Stop reason: HOSPADM

## 2021-01-01 RX ORDER — TORSEMIDE 100 MG/1
100 TABLET ORAL DAILY
Qty: 30 TABLET | Refills: 1 | Status: SHIPPED | OUTPATIENT
Start: 2021-01-01 | End: 2022-01-01

## 2021-01-01 RX ORDER — BUPIVACAINE HYDROCHLORIDE 5 MG/ML
INJECTION, SOLUTION EPIDURAL; INTRACAUDAL PRN
Status: DISCONTINUED | OUTPATIENT
Start: 2021-01-01 | End: 2021-01-01 | Stop reason: ALTCHOICE

## 2021-01-01 RX ORDER — MONTELUKAST SODIUM 10 MG/1
TABLET ORAL
Qty: 30 TABLET | Refills: 5 | Status: SHIPPED | OUTPATIENT
Start: 2021-01-01 | End: 2022-01-01

## 2021-01-01 RX ORDER — METOPROLOL SUCCINATE 50 MG/1
50 TABLET, EXTENDED RELEASE ORAL DAILY
Qty: 30 TABLET | Refills: 2 | Status: SHIPPED | OUTPATIENT
Start: 2021-01-01 | End: 2021-01-01

## 2021-01-01 RX ORDER — ACETAMINOPHEN 325 MG/1
650 TABLET ORAL ONCE
Status: COMPLETED | OUTPATIENT
Start: 2021-01-01 | End: 2021-01-01

## 2021-01-01 RX ORDER — POTASSIUM CHLORIDE 20 MEQ/1
40 TABLET, EXTENDED RELEASE ORAL 2 TIMES DAILY
Qty: 180 TABLET | Refills: 0 | Status: SHIPPED | OUTPATIENT
Start: 2021-01-01 | End: 2021-01-01 | Stop reason: DRUGHIGH

## 2021-01-01 RX ORDER — ALBUTEROL SULFATE 90 UG/1
2 AEROSOL, METERED RESPIRATORY (INHALATION) 2 TIMES DAILY
Status: DISCONTINUED | OUTPATIENT
Start: 2021-01-01 | End: 2021-01-01 | Stop reason: HOSPADM

## 2021-01-01 RX ORDER — METOPROLOL SUCCINATE 50 MG/1
50 TABLET, EXTENDED RELEASE ORAL 2 TIMES DAILY
Qty: 180 TABLET | Refills: 2 | Status: ON HOLD | OUTPATIENT
Start: 2021-01-01 | End: 2022-01-01 | Stop reason: SDUPTHER

## 2021-01-01 RX ORDER — TORSEMIDE 20 MG/1
40 TABLET ORAL DAILY
Qty: 120 TABLET | Refills: 0
Start: 2021-01-01 | End: 2021-01-01

## 2021-01-01 RX ORDER — LIDOCAINE HYDROCHLORIDE 40 MG/ML
SOLUTION TOPICAL ONCE
Status: DISCONTINUED | OUTPATIENT
Start: 2021-01-01 | End: 2021-01-01 | Stop reason: HOSPADM

## 2021-01-01 RX ADMIN — POTASSIUM CHLORIDE 40 MEQ: 1500 TABLET, EXTENDED RELEASE ORAL at 11:20

## 2021-01-01 RX ADMIN — GUAIFENESIN 600 MG: 600 TABLET, EXTENDED RELEASE ORAL at 19:55

## 2021-01-01 RX ADMIN — ALBUTEROL SULFATE 2.5 MG: 2.5 SOLUTION RESPIRATORY (INHALATION) at 07:17

## 2021-01-01 RX ADMIN — INSULIN LISPRO 3 UNITS: 100 INJECTION, SOLUTION INTRAVENOUS; SUBCUTANEOUS at 19:59

## 2021-01-01 RX ADMIN — INSULIN LISPRO 3 UNITS: 100 INJECTION, SOLUTION INTRAVENOUS; SUBCUTANEOUS at 07:59

## 2021-01-01 RX ADMIN — METOPROLOL SUCCINATE 50 MG: 50 TABLET, EXTENDED RELEASE ORAL at 11:07

## 2021-01-01 RX ADMIN — MICONAZOLE NITRATE: 20 POWDER TOPICAL at 21:57

## 2021-01-01 RX ADMIN — Medication 2 PUFF: at 07:11

## 2021-01-01 RX ADMIN — MONTELUKAST SODIUM 10 MG: 10 TABLET, COATED ORAL at 08:52

## 2021-01-01 RX ADMIN — MEROPENEM 1000 MG: 1 INJECTION, POWDER, FOR SOLUTION INTRAVENOUS at 16:50

## 2021-01-01 RX ADMIN — HYDROCORTISONE SODIUM SUCCINATE 100 MG: 100 INJECTION, POWDER, FOR SOLUTION INTRAMUSCULAR; INTRAVENOUS at 01:49

## 2021-01-01 RX ADMIN — INSULIN LISPRO 6 UNITS: 100 INJECTION, SOLUTION INTRAVENOUS; SUBCUTANEOUS at 17:03

## 2021-01-01 RX ADMIN — APIXABAN 2.5 MG: 5 TABLET, FILM COATED ORAL at 08:40

## 2021-01-01 RX ADMIN — ACETAMINOPHEN 650 MG: 325 TABLET ORAL at 07:26

## 2021-01-01 RX ADMIN — CALCIUM GLUCONATE 1000 MG: 98 INJECTION, SOLUTION INTRAVENOUS at 17:15

## 2021-01-01 RX ADMIN — ASPIRIN 81 MG: 81 TABLET, COATED ORAL at 21:16

## 2021-01-01 RX ADMIN — INSULIN GLARGINE 40 UNITS: 100 INJECTION, SOLUTION SUBCUTANEOUS at 09:35

## 2021-01-01 RX ADMIN — APIXABAN 5 MG: 5 TABLET, FILM COATED ORAL at 20:57

## 2021-01-01 RX ADMIN — PROMETHAZINE HYDROCHLORIDE 12.5 MG: 25 INJECTION, SOLUTION INTRAMUSCULAR; INTRAVENOUS at 20:58

## 2021-01-01 RX ADMIN — MONTELUKAST SODIUM 10 MG: 10 TABLET, COATED ORAL at 09:32

## 2021-01-01 RX ADMIN — PANTOPRAZOLE SODIUM 40 MG: 40 TABLET, DELAYED RELEASE ORAL at 05:53

## 2021-01-01 RX ADMIN — GUAIFENESIN 600 MG: 600 TABLET, EXTENDED RELEASE ORAL at 19:47

## 2021-01-01 RX ADMIN — SODIUM PHOSPHATE, MONOBASIC, MONOHYDRATE 12 MMOL: 276; 142 INJECTION, SOLUTION INTRAVENOUS at 00:07

## 2021-01-01 RX ADMIN — MEROPENEM 1000 MG: 1 INJECTION, POWDER, FOR SOLUTION INTRAVENOUS at 02:09

## 2021-01-01 RX ADMIN — PROPOFOL 20 MCG/KG/MIN: 10 INJECTION, EMULSION INTRAVENOUS at 12:16

## 2021-01-01 RX ADMIN — MONTELUKAST SODIUM 10 MG: 10 TABLET, COATED ORAL at 09:00

## 2021-01-01 RX ADMIN — PROPOFOL 10 MCG/KG/MIN: 10 INJECTION, EMULSION INTRAVENOUS at 20:35

## 2021-01-01 RX ADMIN — MUPIROCIN: 20 OINTMENT TOPICAL at 09:38

## 2021-01-01 RX ADMIN — MAGNESIUM SULFATE HEPTAHYDRATE 1000 MG: 1 INJECTION, SOLUTION INTRAVENOUS at 07:29

## 2021-01-01 RX ADMIN — ALBUTEROL SULFATE 2.5 MG: 2.5 SOLUTION RESPIRATORY (INHALATION) at 18:56

## 2021-01-01 RX ADMIN — NITROGLYCERIN 0.4 MG: 0.4 TABLET SUBLINGUAL at 19:54

## 2021-01-01 RX ADMIN — FENTANYL CITRATE 50 MCG: 50 INJECTION INTRAMUSCULAR; INTRAVENOUS at 13:10

## 2021-01-01 RX ADMIN — MONTELUKAST SODIUM 10 MG: 10 TABLET, COATED ORAL at 13:36

## 2021-01-01 RX ADMIN — POTASSIUM CHLORIDE 40 MEQ: 1500 TABLET, EXTENDED RELEASE ORAL at 06:12

## 2021-01-01 RX ADMIN — Medication 2 PUFF: at 07:05

## 2021-01-01 RX ADMIN — NOREPINEPHRINE BITARTRATE 2 MCG/MIN: 1 INJECTION INTRAVENOUS at 23:37

## 2021-01-01 RX ADMIN — MAGNESIUM GLUCONATE 500 MG ORAL TABLET 1200 MG: 500 TABLET ORAL at 20:35

## 2021-01-01 RX ADMIN — HEPARIN SODIUM 2000 UNITS: 1000 INJECTION INTRAVENOUS; SUBCUTANEOUS at 12:31

## 2021-01-01 RX ADMIN — APIXABAN 2.5 MG: 5 TABLET, FILM COATED ORAL at 21:01

## 2021-01-01 RX ADMIN — Medication 2 PUFF: at 07:35

## 2021-01-01 RX ADMIN — Medication: at 20:39

## 2021-01-01 RX ADMIN — Medication 2 PUFF: at 19:30

## 2021-01-01 RX ADMIN — POTASSIUM CHLORIDE 20 MEQ: 1500 TABLET, EXTENDED RELEASE ORAL at 07:02

## 2021-01-01 RX ADMIN — MEROPENEM 1000 MG: 1 INJECTION, POWDER, FOR SOLUTION INTRAVENOUS at 03:10

## 2021-01-01 RX ADMIN — ASPIRIN 81 MG: 81 TABLET, CHEWABLE ORAL at 21:58

## 2021-01-01 RX ADMIN — INSULIN LISPRO 3 UNITS: 100 INJECTION, SOLUTION INTRAVENOUS; SUBCUTANEOUS at 12:02

## 2021-01-01 RX ADMIN — HYDROCORTISONE SODIUM SUCCINATE 100 MG: 100 INJECTION, POWDER, FOR SOLUTION INTRAMUSCULAR; INTRAVENOUS at 22:19

## 2021-01-01 RX ADMIN — INSULIN GLARGINE 45 UNITS: 100 INJECTION, SOLUTION SUBCUTANEOUS at 21:09

## 2021-01-01 RX ADMIN — MEROPENEM 1000 MG: 1 INJECTION, POWDER, FOR SOLUTION INTRAVENOUS at 10:42

## 2021-01-01 RX ADMIN — ENOXAPARIN SODIUM 80 MG: 80 INJECTION SUBCUTANEOUS at 08:51

## 2021-01-01 RX ADMIN — Medication 2 PUFF: at 19:02

## 2021-01-01 RX ADMIN — ALBUTEROL SULFATE 2.5 MG: 2.5 SOLUTION RESPIRATORY (INHALATION) at 19:32

## 2021-01-01 RX ADMIN — DOCUSATE SODIUM 100 MG: 100 CAPSULE, LIQUID FILLED ORAL at 08:50

## 2021-01-01 RX ADMIN — TRAZODONE HYDROCHLORIDE 50 MG: 50 TABLET ORAL at 20:36

## 2021-01-01 RX ADMIN — Medication 40 ML: at 21:18

## 2021-01-01 RX ADMIN — MIDODRINE HYDROCHLORIDE 10 MG: 5 TABLET ORAL at 16:39

## 2021-01-01 RX ADMIN — MEROPENEM 1000 MG: 1 INJECTION, POWDER, FOR SOLUTION INTRAVENOUS at 08:57

## 2021-01-01 RX ADMIN — FERROUS SULFATE TAB 325 MG (65 MG ELEMENTAL FE) 325 MG: 325 (65 FE) TAB at 10:06

## 2021-01-01 RX ADMIN — Medication 10 ML: at 08:03

## 2021-01-01 RX ADMIN — DOCUSATE SODIUM 200 MG: 100 CAPSULE ORAL at 20:34

## 2021-01-01 RX ADMIN — VASOPRESSIN 0.03 UNITS/MIN: 20 INJECTION INTRAVENOUS at 10:23

## 2021-01-01 RX ADMIN — MONTELUKAST SODIUM 10 MG: 10 TABLET, COATED ORAL at 22:05

## 2021-01-01 RX ADMIN — ONDANSETRON HYDROCHLORIDE 4 MG: 2 INJECTION, SOLUTION INTRAMUSCULAR; INTRAVENOUS at 16:47

## 2021-01-01 RX ADMIN — MAGNESIUM GLUCONATE 500 MG ORAL TABLET 400 MG: 500 TABLET ORAL at 09:00

## 2021-01-01 RX ADMIN — ALBUTEROL SULFATE 2.5 MG: 2.5 SOLUTION RESPIRATORY (INHALATION) at 19:21

## 2021-01-01 RX ADMIN — PANTOPRAZOLE SODIUM 40 MG: 40 INJECTION, POWDER, FOR SOLUTION INTRAVENOUS at 08:25

## 2021-01-01 RX ADMIN — ASPIRIN 81 MG: 81 TABLET, COATED ORAL at 20:08

## 2021-01-01 RX ADMIN — Medication: at 18:30

## 2021-01-01 RX ADMIN — MAGNESIUM SULFATE HEPTAHYDRATE 4000 MG: 40 INJECTION, SOLUTION INTRAVENOUS at 12:31

## 2021-01-01 RX ADMIN — APIXABAN 2.5 MG: 5 TABLET, FILM COATED ORAL at 21:56

## 2021-01-01 RX ADMIN — INSULIN LISPRO 1 UNITS: 100 INJECTION, SOLUTION INTRAVENOUS; SUBCUTANEOUS at 15:53

## 2021-01-01 RX ADMIN — DOCUSATE SODIUM 100 MG: 100 CAPSULE, LIQUID FILLED ORAL at 09:46

## 2021-01-01 RX ADMIN — FUROSEMIDE 40 MG: 10 INJECTION, SOLUTION INTRAMUSCULAR; INTRAVENOUS at 08:06

## 2021-01-01 RX ADMIN — MEROPENEM 1000 MG: 1 INJECTION, POWDER, FOR SOLUTION INTRAVENOUS at 08:23

## 2021-01-01 RX ADMIN — HEPARIN SODIUM 900 UNITS/HR: 10000 INJECTION, SOLUTION INTRAVENOUS at 16:38

## 2021-01-01 RX ADMIN — ALBUTEROL SULFATE 2.5 MG: 2.5 SOLUTION RESPIRATORY (INHALATION) at 12:11

## 2021-01-01 RX ADMIN — INSULIN GLARGINE 25 UNITS: 100 INJECTION, SOLUTION SUBCUTANEOUS at 21:12

## 2021-01-01 RX ADMIN — ASPIRIN 81 MG: 81 TABLET, COATED ORAL at 20:23

## 2021-01-01 RX ADMIN — INSULIN LISPRO 9 UNITS: 100 INJECTION, SOLUTION INTRAVENOUS; SUBCUTANEOUS at 11:18

## 2021-01-01 RX ADMIN — Medication: at 15:24

## 2021-01-01 RX ADMIN — Medication 2 PUFF: at 09:11

## 2021-01-01 RX ADMIN — FUROSEMIDE 40 MG: 10 INJECTION, SOLUTION INTRAMUSCULAR; INTRAVENOUS at 08:53

## 2021-01-01 RX ADMIN — HEPARIN SODIUM 1570 UNITS/HR: 10000 INJECTION, SOLUTION INTRAVENOUS at 06:17

## 2021-01-01 RX ADMIN — SODIUM CHLORIDE: 9 INJECTION, SOLUTION INTRAVENOUS at 00:56

## 2021-01-01 RX ADMIN — MUPIROCIN: 20 OINTMENT TOPICAL at 19:51

## 2021-01-01 RX ADMIN — ANORECTAL OINTMENT: 15.7; .44; 24; 20.6 OINTMENT TOPICAL at 10:08

## 2021-01-01 RX ADMIN — Medication 2 PUFF: at 20:14

## 2021-01-01 RX ADMIN — Medication 2 PUFF: at 08:08

## 2021-01-01 RX ADMIN — ALBUTEROL SULFATE 2.5 MG: 2.5 SOLUTION RESPIRATORY (INHALATION) at 23:36

## 2021-01-01 RX ADMIN — ASPIRIN 81 MG: 81 TABLET, CHEWABLE ORAL at 21:56

## 2021-01-01 RX ADMIN — EPINEPHRINE 5 MCG/MIN: 1 INJECTION PARENTERAL at 00:57

## 2021-01-01 RX ADMIN — CETIRIZINE HYDROCHLORIDE 10 MG: 10 TABLET ORAL at 20:57

## 2021-01-01 RX ADMIN — APIXABAN 5 MG: 5 TABLET, FILM COATED ORAL at 20:29

## 2021-01-01 RX ADMIN — MONTELUKAST SODIUM 10 MG: 10 TABLET, COATED ORAL at 10:26

## 2021-01-01 RX ADMIN — FUROSEMIDE 20 MG/HR: 100 INJECTION, SOLUTION INTRAMUSCULAR; INTRAVENOUS at 15:48

## 2021-01-01 RX ADMIN — VANCOMYCIN HYDROCHLORIDE 1000 MG: 1 INJECTION, POWDER, LYOPHILIZED, FOR SOLUTION INTRAVENOUS at 05:08

## 2021-01-01 RX ADMIN — PANTOPRAZOLE SODIUM 40 MG: 40 INJECTION, POWDER, FOR SOLUTION INTRAVENOUS at 08:08

## 2021-01-01 RX ADMIN — PANTOPRAZOLE SODIUM 40 MG: 40 TABLET, DELAYED RELEASE ORAL at 04:18

## 2021-01-01 RX ADMIN — ASPIRIN 81 MG: 81 TABLET, CHEWABLE ORAL at 21:19

## 2021-01-01 RX ADMIN — SODIUM BICARBONATE: 84 INJECTION, SOLUTION INTRAVENOUS at 12:57

## 2021-01-01 RX ADMIN — VANCOMYCIN HYDROCHLORIDE 1000 MG: 1 INJECTION, POWDER, LYOPHILIZED, FOR SOLUTION INTRAVENOUS at 08:14

## 2021-01-01 RX ADMIN — Medication 2 PUFF: at 18:57

## 2021-01-01 RX ADMIN — PROPOFOL 20 MCG/KG/MIN: 10 INJECTION, EMULSION INTRAVENOUS at 05:58

## 2021-01-01 RX ADMIN — ALTEPLASE 2 MG: 2.2 INJECTION, POWDER, LYOPHILIZED, FOR SOLUTION INTRAVENOUS at 20:05

## 2021-01-01 RX ADMIN — ACETAMINOPHEN 650 MG: 325 TABLET ORAL at 08:05

## 2021-01-01 RX ADMIN — POTASSIUM CHLORIDE 10 MEQ: 7.46 INJECTION, SOLUTION INTRAVENOUS at 08:47

## 2021-01-01 RX ADMIN — MONTELUKAST SODIUM 10 MG: 10 TABLET, COATED ORAL at 13:16

## 2021-01-01 RX ADMIN — ASPIRIN 81 MG: 81 TABLET, CHEWABLE ORAL at 20:19

## 2021-01-01 RX ADMIN — Medication: at 05:08

## 2021-01-01 RX ADMIN — SODIUM CHLORIDE, PRESERVATIVE FREE 10 ML: 5 INJECTION INTRAVENOUS at 09:13

## 2021-01-01 RX ADMIN — SODIUM CHLORIDE 25 ML: 9 INJECTION, SOLUTION INTRAVENOUS at 16:03

## 2021-01-01 RX ADMIN — ACETAMINOPHEN 650 MG: 325 TABLET ORAL at 05:53

## 2021-01-01 RX ADMIN — INSULIN GLARGINE 30 UNITS: 100 INJECTION, SOLUTION SUBCUTANEOUS at 09:06

## 2021-01-01 RX ADMIN — POTASSIUM CHLORIDE 20 MEQ: 29.8 INJECTION, SOLUTION INTRAVENOUS at 21:25

## 2021-01-01 RX ADMIN — Medication 2 PUFF: at 19:20

## 2021-01-01 RX ADMIN — Medication 2 PUFF: at 07:17

## 2021-01-01 RX ADMIN — METOPROLOL SUCCINATE 50 MG: 50 TABLET, EXTENDED RELEASE ORAL at 08:05

## 2021-01-01 RX ADMIN — APIXABAN 5 MG: 5 TABLET, FILM COATED ORAL at 10:06

## 2021-01-01 RX ADMIN — PANTOPRAZOLE SODIUM 40 MG: 40 TABLET, DELAYED RELEASE ORAL at 05:49

## 2021-01-01 RX ADMIN — Medication 10 ML: at 08:55

## 2021-01-01 RX ADMIN — SODIUM CHLORIDE, PRESERVATIVE FREE 10 ML: 5 INJECTION INTRAVENOUS at 21:40

## 2021-01-01 RX ADMIN — APIXABAN 5 MG: 5 TABLET, FILM COATED ORAL at 10:03

## 2021-01-01 RX ADMIN — SODIUM CHLORIDE, PRESERVATIVE FREE 10 ML: 5 INJECTION INTRAVENOUS at 08:41

## 2021-01-01 RX ADMIN — Medication 2 PUFF: at 18:37

## 2021-01-01 RX ADMIN — APIXABAN 5 MG: 5 TABLET, FILM COATED ORAL at 10:25

## 2021-01-01 RX ADMIN — INSULIN GLARGINE 25 UNITS: 100 INJECTION, SOLUTION SUBCUTANEOUS at 20:55

## 2021-01-01 RX ADMIN — Medication: at 13:06

## 2021-01-01 RX ADMIN — ASPIRIN 81 MG: 81 TABLET, COATED ORAL at 19:51

## 2021-01-01 RX ADMIN — SODIUM PHOSPHATE, MONOBASIC, MONOHYDRATE 6 MMOL: 276; 142 INJECTION, SOLUTION INTRAVENOUS at 18:27

## 2021-01-01 RX ADMIN — ASPIRIN 81 MG: 81 TABLET, CHEWABLE ORAL at 19:47

## 2021-01-01 RX ADMIN — TRAZODONE HYDROCHLORIDE 50 MG: 50 TABLET ORAL at 21:56

## 2021-01-01 RX ADMIN — PROPOFOL 20 MCG/KG/MIN: 10 INJECTION, EMULSION INTRAVENOUS at 19:29

## 2021-01-01 RX ADMIN — CALCIUM GLUCONATE 1000 MG: 98 INJECTION, SOLUTION INTRAVENOUS at 00:05

## 2021-01-01 RX ADMIN — NOREPINEPHRINE BITARTRATE 50 MCG/MIN: 1 INJECTION INTRAVENOUS at 03:46

## 2021-01-01 RX ADMIN — INSULIN GLARGINE 25 UNITS: 100 INJECTION, SOLUTION SUBCUTANEOUS at 21:09

## 2021-01-01 RX ADMIN — INSULIN GLARGINE 15 UNITS: 100 INJECTION, SOLUTION SUBCUTANEOUS at 08:56

## 2021-01-01 RX ADMIN — MAGNESIUM SULFATE HEPTAHYDRATE 2000 MG: 40 INJECTION, SOLUTION INTRAVENOUS at 09:08

## 2021-01-01 RX ADMIN — MEROPENEM 1000 MG: 1 INJECTION, POWDER, FOR SOLUTION INTRAVENOUS at 08:04

## 2021-01-01 RX ADMIN — DOCUSATE SODIUM 100 MG: 100 CAPSULE, LIQUID FILLED ORAL at 11:08

## 2021-01-01 RX ADMIN — CETIRIZINE HYDROCHLORIDE 10 MG: 10 TABLET ORAL at 08:06

## 2021-01-01 RX ADMIN — Medication: at 01:43

## 2021-01-01 RX ADMIN — LINEZOLID 600 MG: 600 INJECTION, SOLUTION INTRAVENOUS at 09:46

## 2021-01-01 RX ADMIN — ACETAMINOPHEN 650 MG: 325 TABLET ORAL at 22:21

## 2021-01-01 RX ADMIN — POTASSIUM CHLORIDE 20 MEQ: 1500 TABLET, EXTENDED RELEASE ORAL at 09:07

## 2021-01-01 RX ADMIN — MIDAZOLAM HYDROCHLORIDE 1 MG: 5 INJECTION INTRAMUSCULAR; INTRAVENOUS at 13:10

## 2021-01-01 RX ADMIN — ONDANSETRON HYDROCHLORIDE 4 MG: 2 INJECTION, SOLUTION INTRAMUSCULAR; INTRAVENOUS at 03:01

## 2021-01-01 RX ADMIN — ALBUTEROL SULFATE 2.5 MG: 2.5 SOLUTION RESPIRATORY (INHALATION) at 19:01

## 2021-01-01 RX ADMIN — ALBUTEROL SULFATE 2.5 MG: 2.5 SOLUTION RESPIRATORY (INHALATION) at 06:57

## 2021-01-01 RX ADMIN — INSULIN LISPRO 9 UNITS: 100 INJECTION, SOLUTION INTRAVENOUS; SUBCUTANEOUS at 04:19

## 2021-01-01 RX ADMIN — DOCUSATE SODIUM 100 MG: 100 CAPSULE, LIQUID FILLED ORAL at 13:16

## 2021-01-01 RX ADMIN — ALBUTEROL SULFATE 2.5 MG: 2.5 SOLUTION RESPIRATORY (INHALATION) at 07:20

## 2021-01-01 RX ADMIN — ALBUTEROL SULFATE 2.5 MG: 2.5 SOLUTION RESPIRATORY (INHALATION) at 06:41

## 2021-01-01 RX ADMIN — Medication 2 PUFF: at 19:10

## 2021-01-01 RX ADMIN — SODIUM CHLORIDE, PRESERVATIVE FREE 10 ML: 5 INJECTION INTRAVENOUS at 09:19

## 2021-01-01 RX ADMIN — INSULIN LISPRO 3 UNITS: 100 INJECTION, SOLUTION INTRAVENOUS; SUBCUTANEOUS at 17:55

## 2021-01-01 RX ADMIN — MIDODRINE HYDROCHLORIDE 10 MG: 5 TABLET ORAL at 17:58

## 2021-01-01 RX ADMIN — VASOPRESSIN 0.03 UNITS/MIN: 20 INJECTION INTRAVENOUS at 17:52

## 2021-01-01 RX ADMIN — SODIUM PHOSPHATE, MONOBASIC, MONOHYDRATE 6 MMOL: 276; 142 INJECTION, SOLUTION INTRAVENOUS at 07:57

## 2021-01-01 RX ADMIN — INSULIN LISPRO 3 UNITS: 100 INJECTION, SOLUTION INTRAVENOUS; SUBCUTANEOUS at 04:07

## 2021-01-01 RX ADMIN — ACETAMINOPHEN 650 MG: 325 TABLET ORAL at 06:03

## 2021-01-01 RX ADMIN — Medication: at 14:30

## 2021-01-01 RX ADMIN — ALBUTEROL SULFATE 2.5 MG: 2.5 SOLUTION RESPIRATORY (INHALATION) at 14:24

## 2021-01-01 RX ADMIN — DOCUSATE SODIUM 100 MG: 100 CAPSULE, LIQUID FILLED ORAL at 08:52

## 2021-01-01 RX ADMIN — PANTOPRAZOLE SODIUM 40 MG: 40 INJECTION, POWDER, FOR SOLUTION INTRAVENOUS at 08:02

## 2021-01-01 RX ADMIN — Medication 2 PUFF: at 07:32

## 2021-01-01 RX ADMIN — Medication 2 PUFF: at 06:57

## 2021-01-01 RX ADMIN — APIXABAN 2.5 MG: 5 TABLET, FILM COATED ORAL at 08:06

## 2021-01-01 RX ADMIN — ALBUTEROL SULFATE 2.5 MG: 2.5 SOLUTION RESPIRATORY (INHALATION) at 07:52

## 2021-01-01 RX ADMIN — INSULIN GLARGINE 30 UNITS: 100 INJECTION, SOLUTION SUBCUTANEOUS at 09:02

## 2021-01-01 RX ADMIN — ROCURONIUM BROMIDE 60 MG: 10 INJECTION, SOLUTION INTRAVENOUS at 11:25

## 2021-01-01 RX ADMIN — SODIUM BICARBONATE: 84 INJECTION, SOLUTION INTRAVENOUS at 20:41

## 2021-01-01 RX ADMIN — MAGNESIUM GLUCONATE 500 MG ORAL TABLET 400 MG: 500 TABLET ORAL at 21:40

## 2021-01-01 RX ADMIN — Medication: at 22:11

## 2021-01-01 RX ADMIN — APIXABAN 2.5 MG: 5 TABLET, FILM COATED ORAL at 10:26

## 2021-01-01 RX ADMIN — Medication: at 19:22

## 2021-01-01 RX ADMIN — INSULIN GLARGINE 40 UNITS: 100 INJECTION, SOLUTION SUBCUTANEOUS at 21:28

## 2021-01-01 RX ADMIN — PANTOPRAZOLE SODIUM 40 MG: 40 INJECTION, POWDER, FOR SOLUTION INTRAVENOUS at 08:54

## 2021-01-01 RX ADMIN — SODIUM BICARBONATE: 84 INJECTION, SOLUTION INTRAVENOUS at 23:40

## 2021-01-01 RX ADMIN — MAGNESIUM GLUCONATE 500 MG ORAL TABLET 400 MG: 500 TABLET ORAL at 20:37

## 2021-01-01 RX ADMIN — ASPIRIN 81 MG: 81 TABLET, CHEWABLE ORAL at 22:05

## 2021-01-01 RX ADMIN — SODIUM CHLORIDE, PRESERVATIVE FREE 10 ML: 5 INJECTION INTRAVENOUS at 20:41

## 2021-01-01 RX ADMIN — Medication 2 PUFF: at 07:28

## 2021-01-01 RX ADMIN — HEPARIN SODIUM 1190 UNITS/HR: 10000 INJECTION, SOLUTION INTRAVENOUS at 00:52

## 2021-01-01 RX ADMIN — SODIUM CHLORIDE, POTASSIUM CHLORIDE, SODIUM LACTATE AND CALCIUM CHLORIDE: 600; 310; 30; 20 INJECTION, SOLUTION INTRAVENOUS at 11:14

## 2021-01-01 RX ADMIN — MONTELUKAST SODIUM 10 MG: 10 TABLET, COATED ORAL at 10:03

## 2021-01-01 RX ADMIN — ALBUTEROL SULFATE 2.5 MG: 2.5 SOLUTION RESPIRATORY (INHALATION) at 07:42

## 2021-01-01 RX ADMIN — Medication: at 00:41

## 2021-01-01 RX ADMIN — INSULIN LISPRO 3 UNITS: 100 INJECTION, SOLUTION INTRAVENOUS; SUBCUTANEOUS at 04:24

## 2021-01-01 RX ADMIN — HEPARIN SODIUM 1570 UNITS/HR: 10000 INJECTION, SOLUTION INTRAVENOUS at 08:09

## 2021-01-01 RX ADMIN — HEPARIN SODIUM 110 UNITS/HR: 10000 INJECTION, SOLUTION INTRAVENOUS at 22:53

## 2021-01-01 RX ADMIN — CALCIUM GLUCONATE 1000 MG: 98 INJECTION, SOLUTION INTRAVENOUS at 23:44

## 2021-01-01 RX ADMIN — VASOPRESSIN 0.03 UNITS/MIN: 20 INJECTION INTRAVENOUS at 18:04

## 2021-01-01 RX ADMIN — TRAZODONE HYDROCHLORIDE 50 MG: 50 TABLET ORAL at 21:32

## 2021-01-01 RX ADMIN — INSULIN LISPRO 3 UNITS: 100 INJECTION, SOLUTION INTRAVENOUS; SUBCUTANEOUS at 22:00

## 2021-01-01 RX ADMIN — PANTOPRAZOLE SODIUM 40 MG: 40 TABLET, DELAYED RELEASE ORAL at 05:12

## 2021-01-01 RX ADMIN — SODIUM PHOSPHATE, MONOBASIC, MONOHYDRATE 6 MMOL: 276; 142 INJECTION, SOLUTION INTRAVENOUS at 09:00

## 2021-01-01 RX ADMIN — HEPARIN SODIUM 900 UNITS/HR: 10000 INJECTION, SOLUTION INTRAVENOUS at 07:00

## 2021-01-01 RX ADMIN — STANDARDIZED SENNA CONCENTRATE 17.2 MG: 8.6 TABLET ORAL at 20:10

## 2021-01-01 RX ADMIN — Medication 10 ML: at 08:27

## 2021-01-01 RX ADMIN — SODIUM CHLORIDE: 9 INJECTION, SOLUTION INTRAVENOUS at 09:49

## 2021-01-01 RX ADMIN — HYDROCORTISONE SODIUM SUCCINATE 100 MG: 100 INJECTION, POWDER, FOR SOLUTION INTRAMUSCULAR; INTRAVENOUS at 01:53

## 2021-01-01 RX ADMIN — MAGNESIUM GLUCONATE 500 MG ORAL TABLET 400 MG: 500 TABLET ORAL at 20:50

## 2021-01-01 RX ADMIN — MIDAZOLAM HYDROCHLORIDE 1 MG: 5 INJECTION INTRAMUSCULAR; INTRAVENOUS at 13:35

## 2021-01-01 RX ADMIN — FUROSEMIDE 40 MG: 10 INJECTION, SOLUTION INTRAVENOUS at 13:23

## 2021-01-01 RX ADMIN — Medication 2 PUFF: at 19:06

## 2021-01-01 RX ADMIN — INSULIN GLARGINE 30 UNITS: 100 INJECTION, SOLUTION SUBCUTANEOUS at 09:04

## 2021-01-01 RX ADMIN — INSULIN GLARGINE 25 UNITS: 100 INJECTION, SOLUTION SUBCUTANEOUS at 20:29

## 2021-01-01 RX ADMIN — Medication: at 08:26

## 2021-01-01 RX ADMIN — PHENYLEPHRINE HYDROCHLORIDE 200 MCG: 10 INJECTION INTRAVENOUS at 11:11

## 2021-01-01 RX ADMIN — STANDARDIZED SENNA CONCENTRATE 17.2 MG: 8.6 TABLET ORAL at 19:47

## 2021-01-01 RX ADMIN — Medication: at 23:13

## 2021-01-01 RX ADMIN — ALBUTEROL SULFATE 2.5 MG: 2.5 SOLUTION RESPIRATORY (INHALATION) at 19:45

## 2021-01-01 RX ADMIN — MIDODRINE HYDROCHLORIDE 10 MG: 5 TABLET ORAL at 15:19

## 2021-01-01 RX ADMIN — Medication 10 ML: at 08:13

## 2021-01-01 RX ADMIN — Medication 2 PUFF: at 07:18

## 2021-01-01 RX ADMIN — FERROUS SULFATE TAB 325 MG (65 MG ELEMENTAL FE) 325 MG: 325 (65 FE) TAB at 10:26

## 2021-01-01 RX ADMIN — ALBUTEROL SULFATE 2.5 MG: 2.5 SOLUTION RESPIRATORY (INHALATION) at 07:35

## 2021-01-01 RX ADMIN — MAGNESIUM GLUCONATE 500 MG ORAL TABLET 400 MG: 500 TABLET ORAL at 21:57

## 2021-01-01 RX ADMIN — NOREPINEPHRINE BITARTRATE 14 MCG/MIN: 1 INJECTION INTRAVENOUS at 05:33

## 2021-01-01 RX ADMIN — FERROUS SULFATE TAB 325 MG (65 MG ELEMENTAL FE) 325 MG: 325 (65 FE) TAB at 08:05

## 2021-01-01 RX ADMIN — ACETAMINOPHEN 650 MG: 325 TABLET ORAL at 09:31

## 2021-01-01 RX ADMIN — TRAZODONE HYDROCHLORIDE 50 MG: 50 TABLET ORAL at 21:10

## 2021-01-01 RX ADMIN — SODIUM CHLORIDE, PRESERVATIVE FREE 10 ML: 5 INJECTION INTRAVENOUS at 08:52

## 2021-01-01 RX ADMIN — CALCIUM GLUCONATE 1000 MG: 98 INJECTION, SOLUTION INTRAVENOUS at 23:22

## 2021-01-01 RX ADMIN — ALBUTEROL SULFATE 2.5 MG: 2.5 SOLUTION RESPIRATORY (INHALATION) at 07:10

## 2021-01-01 RX ADMIN — ASPIRIN 81 MG: 81 TABLET, CHEWABLE ORAL at 21:53

## 2021-01-01 RX ADMIN — INSULIN LISPRO 3 UNITS: 100 INJECTION, SOLUTION INTRAVENOUS; SUBCUTANEOUS at 20:24

## 2021-01-01 RX ADMIN — HYDROCORTISONE SODIUM SUCCINATE 100 MG: 100 INJECTION, POWDER, FOR SOLUTION INTRAMUSCULAR; INTRAVENOUS at 10:09

## 2021-01-01 RX ADMIN — ACETAMINOPHEN 650 MG: 325 TABLET ORAL at 17:44

## 2021-01-01 RX ADMIN — CETIRIZINE HYDROCHLORIDE 10 MG: 10 TABLET ORAL at 10:24

## 2021-01-01 RX ADMIN — FERROUS SULFATE TAB 325 MG (65 MG ELEMENTAL FE) 325 MG: 325 (65 FE) TAB at 09:47

## 2021-01-01 RX ADMIN — ALBUMIN (HUMAN) 25 G: 0.25 INJECTION, SOLUTION INTRAVENOUS at 02:10

## 2021-01-01 RX ADMIN — ASPIRIN 81 MG: 81 TABLET, COATED ORAL at 19:59

## 2021-01-01 RX ADMIN — CETIRIZINE HYDROCHLORIDE 10 MG: 10 TABLET ORAL at 08:50

## 2021-01-01 RX ADMIN — FERROUS SULFATE TAB 325 MG (65 MG ELEMENTAL FE) 325 MG: 325 (65 FE) TAB at 09:00

## 2021-01-01 RX ADMIN — SODIUM CHLORIDE, PRESERVATIVE FREE 10 ML: 5 INJECTION INTRAVENOUS at 10:31

## 2021-01-01 RX ADMIN — MEROPENEM 1000 MG: 1 INJECTION, POWDER, FOR SOLUTION INTRAVENOUS at 11:31

## 2021-01-01 RX ADMIN — ALBUTEROL SULFATE 2.5 MG: 2.5 SOLUTION RESPIRATORY (INHALATION) at 17:28

## 2021-01-01 RX ADMIN — Medication 2 PUFF: at 06:43

## 2021-01-01 RX ADMIN — SODIUM PHOSPHATE, MONOBASIC, MONOHYDRATE 6 MMOL: 276; 142 INJECTION, SOLUTION INTRAVENOUS at 01:10

## 2021-01-01 RX ADMIN — POTASSIUM CHLORIDE 20 MEQ: 1500 TABLET, EXTENDED RELEASE ORAL at 09:00

## 2021-01-01 RX ADMIN — INSULIN GLARGINE 35 UNITS: 100 INJECTION, SOLUTION SUBCUTANEOUS at 20:26

## 2021-01-01 RX ADMIN — MAGNESIUM GLUCONATE 500 MG ORAL TABLET 400 MG: 500 TABLET ORAL at 21:19

## 2021-01-01 RX ADMIN — HEPARIN SODIUM 15.7 ML/HR: 10000 INJECTION, SOLUTION INTRAVENOUS at 01:17

## 2021-01-01 RX ADMIN — ALBUTEROL SULFATE 2.5 MG: 2.5 SOLUTION RESPIRATORY (INHALATION) at 07:00

## 2021-01-01 RX ADMIN — ALBUTEROL SULFATE 2.5 MG: 2.5 SOLUTION RESPIRATORY (INHALATION) at 06:51

## 2021-01-01 RX ADMIN — HYDROCORTISONE SODIUM SUCCINATE 100 MG: 100 INJECTION, POWDER, FOR SOLUTION INTRAMUSCULAR; INTRAVENOUS at 21:08

## 2021-01-01 RX ADMIN — DOCUSATE SODIUM 200 MG: 100 CAPSULE ORAL at 10:02

## 2021-01-01 RX ADMIN — SODIUM CHLORIDE 25 ML: 9 INJECTION, SOLUTION INTRAVENOUS at 15:20

## 2021-01-01 RX ADMIN — Medication: at 15:19

## 2021-01-01 RX ADMIN — MIDODRINE HYDROCHLORIDE 10 MG: 5 TABLET ORAL at 17:30

## 2021-01-01 RX ADMIN — MEROPENEM 1000 MG: 1 INJECTION, POWDER, FOR SOLUTION INTRAVENOUS at 01:08

## 2021-01-01 RX ADMIN — Medication 2 PUFF: at 19:45

## 2021-01-01 RX ADMIN — INSULIN GLARGINE 25 UNITS: 100 INJECTION, SOLUTION SUBCUTANEOUS at 08:57

## 2021-01-01 RX ADMIN — HEPARIN SODIUM 11.9 ML/HR: 10000 INJECTION, SOLUTION INTRAVENOUS at 12:32

## 2021-01-01 RX ADMIN — SODIUM CHLORIDE 1000 ML: 9 INJECTION, SOLUTION INTRAVENOUS at 03:33

## 2021-01-01 RX ADMIN — INSULIN LISPRO 3 UNITS: 100 INJECTION, SOLUTION INTRAVENOUS; SUBCUTANEOUS at 17:05

## 2021-01-01 RX ADMIN — POTASSIUM CHLORIDE 20 MEQ: 400 INJECTION, SOLUTION INTRAVENOUS at 11:35

## 2021-01-01 RX ADMIN — SODIUM CHLORIDE, PRESERVATIVE FREE 10 ML: 5 INJECTION INTRAVENOUS at 21:57

## 2021-01-01 RX ADMIN — INSULIN LISPRO 3 UNITS: 100 INJECTION, SOLUTION INTRAVENOUS; SUBCUTANEOUS at 16:17

## 2021-01-01 RX ADMIN — PHENYLEPHRINE HYDROCHLORIDE 200 MCG: 10 INJECTION INTRAVENOUS at 11:54

## 2021-01-01 RX ADMIN — INSULIN GLARGINE 25 UNITS: 100 INJECTION, SOLUTION SUBCUTANEOUS at 21:21

## 2021-01-01 RX ADMIN — MICONAZOLE NITRATE: 20 POWDER TOPICAL at 09:14

## 2021-01-01 RX ADMIN — MEROPENEM 1000 MG: 1 INJECTION, POWDER, FOR SOLUTION INTRAVENOUS at 17:03

## 2021-01-01 RX ADMIN — INSULIN LISPRO 3 UNITS: 100 INJECTION, SOLUTION INTRAVENOUS; SUBCUTANEOUS at 08:18

## 2021-01-01 RX ADMIN — ACETAMINOPHEN 650 MG: 325 TABLET ORAL at 21:18

## 2021-01-01 RX ADMIN — GUAIFENESIN 600 MG: 600 TABLET, EXTENDED RELEASE ORAL at 09:00

## 2021-01-01 RX ADMIN — ACETAMINOPHEN 650 MG: 325 TABLET ORAL at 15:43

## 2021-01-01 RX ADMIN — MUPIROCIN: 20 OINTMENT TOPICAL at 08:10

## 2021-01-01 RX ADMIN — Medication 1500 MG: at 10:47

## 2021-01-01 RX ADMIN — LEVOFLOXACIN 500 MG: 5 INJECTION, SOLUTION INTRAVENOUS at 16:20

## 2021-01-01 RX ADMIN — GUAIFENESIN 600 MG: 600 TABLET, EXTENDED RELEASE ORAL at 21:40

## 2021-01-01 RX ADMIN — INSULIN GLARGINE 45 UNITS: 100 INJECTION, SOLUTION SUBCUTANEOUS at 09:25

## 2021-01-01 RX ADMIN — SODIUM CHLORIDE, PRESERVATIVE FREE 10 ML: 5 INJECTION INTRAVENOUS at 09:50

## 2021-01-01 RX ADMIN — MIDAZOLAM HYDROCHLORIDE 2 MG: 1 INJECTION, SOLUTION INTRAMUSCULAR; INTRAVENOUS at 23:19

## 2021-01-01 RX ADMIN — ALBUTEROL SULFATE 2.5 MG: 2.5 SOLUTION RESPIRATORY (INHALATION) at 19:09

## 2021-01-01 RX ADMIN — PANTOPRAZOLE SODIUM 40 MG: 40 TABLET, DELAYED RELEASE ORAL at 05:22

## 2021-01-01 RX ADMIN — MAGNESIUM GLUCONATE 500 MG ORAL TABLET 1200 MG: 500 TABLET ORAL at 20:29

## 2021-01-01 RX ADMIN — MEROPENEM 1000 MG: 1 INJECTION, POWDER, FOR SOLUTION INTRAVENOUS at 01:14

## 2021-01-01 RX ADMIN — CALCIUM GLUCONATE 1000 MG: 98 INJECTION, SOLUTION INTRAVENOUS at 08:06

## 2021-01-01 RX ADMIN — MAGNESIUM GLUCONATE 500 MG ORAL TABLET 400 MG: 500 TABLET ORAL at 21:29

## 2021-01-01 RX ADMIN — APIXABAN 2.5 MG: 5 TABLET, FILM COATED ORAL at 21:32

## 2021-01-01 RX ADMIN — SODIUM PHOSPHATE, MONOBASIC, MONOHYDRATE 6 MMOL: 276; 142 INJECTION, SOLUTION INTRAVENOUS at 15:22

## 2021-01-01 RX ADMIN — PANTOPRAZOLE SODIUM 40 MG: 40 TABLET, DELAYED RELEASE ORAL at 05:45

## 2021-01-01 RX ADMIN — ANORECTAL OINTMENT: 15.7; .44; 24; 20.6 OINTMENT TOPICAL at 14:55

## 2021-01-01 RX ADMIN — INSULIN GLARGINE 15 UNITS: 100 INJECTION, SOLUTION SUBCUTANEOUS at 20:58

## 2021-01-01 RX ADMIN — SODIUM CHLORIDE, PRESERVATIVE FREE 5 ML: 5 INJECTION INTRAVENOUS at 08:08

## 2021-01-01 RX ADMIN — MAGNESIUM GLUCONATE 500 MG ORAL TABLET 400 MG: 500 TABLET ORAL at 10:24

## 2021-01-01 RX ADMIN — MIDODRINE HYDROCHLORIDE 10 MG: 5 TABLET ORAL at 08:26

## 2021-01-01 RX ADMIN — INSULIN LISPRO 6 UNITS: 100 INJECTION, SOLUTION INTRAVENOUS; SUBCUTANEOUS at 12:50

## 2021-01-01 RX ADMIN — ALBUTEROL SULFATE 2.5 MG: 2.5 SOLUTION RESPIRATORY (INHALATION) at 11:22

## 2021-01-01 RX ADMIN — TRAZODONE HYDROCHLORIDE 50 MG: 50 TABLET ORAL at 22:05

## 2021-01-01 RX ADMIN — INSULIN GLARGINE 40 UNITS: 100 INJECTION, SOLUTION SUBCUTANEOUS at 08:43

## 2021-01-01 RX ADMIN — INSULIN LISPRO 6 UNITS: 100 INJECTION, SOLUTION INTRAVENOUS; SUBCUTANEOUS at 19:55

## 2021-01-01 RX ADMIN — CETIRIZINE HYDROCHLORIDE 10 MG: 10 TABLET ORAL at 08:59

## 2021-01-01 RX ADMIN — TRAZODONE HYDROCHLORIDE 50 MG: 50 TABLET ORAL at 20:37

## 2021-01-01 RX ADMIN — ALBUTEROL SULFATE 2.5 MG: 2.5 SOLUTION RESPIRATORY (INHALATION) at 19:02

## 2021-01-01 RX ADMIN — PROTHROMBIN, COAGULATION FACTOR VII HUMAN, COAGULATION FACTOR IX HUMAN, COAGULATION FACTOR X HUMAN, PROTEIN C, PROTEIN S HUMAN, AND WATER 5000 UNITS: KIT at 16:40

## 2021-01-01 RX ADMIN — METOPROLOL SUCCINATE 50 MG: 50 TABLET, EXTENDED RELEASE ORAL at 10:27

## 2021-01-01 RX ADMIN — ONDANSETRON HYDROCHLORIDE 4 MG: 2 INJECTION, SOLUTION INTRAMUSCULAR; INTRAVENOUS at 12:05

## 2021-01-01 RX ADMIN — INSULIN LISPRO 3 UNITS: 100 INJECTION, SOLUTION INTRAVENOUS; SUBCUTANEOUS at 20:33

## 2021-01-01 RX ADMIN — ACETAMINOPHEN 650 MG: 650 SUPPOSITORY RECTAL at 11:56

## 2021-01-01 RX ADMIN — INSULIN GLARGINE 25 UNITS: 100 INJECTION, SOLUTION SUBCUTANEOUS at 09:42

## 2021-01-01 RX ADMIN — Medication 2 PUFF: at 19:13

## 2021-01-01 RX ADMIN — ACETAMINOPHEN 650 MG: 325 TABLET ORAL at 03:01

## 2021-01-01 RX ADMIN — Medication 2 PUFF: at 07:20

## 2021-01-01 RX ADMIN — GUAIFENESIN 600 MG: 600 TABLET, EXTENDED RELEASE ORAL at 08:40

## 2021-01-01 RX ADMIN — Medication: at 16:18

## 2021-01-01 RX ADMIN — INSULIN LISPRO 3 UNITS: 100 INJECTION, SOLUTION INTRAVENOUS; SUBCUTANEOUS at 11:47

## 2021-01-01 RX ADMIN — MEROPENEM 1000 MG: 1 INJECTION, POWDER, FOR SOLUTION INTRAVENOUS at 09:21

## 2021-01-01 RX ADMIN — HEPARIN SODIUM 1570 UNITS/HR: 10000 INJECTION, SOLUTION INTRAVENOUS at 15:49

## 2021-01-01 RX ADMIN — FUROSEMIDE 40 MG: 10 INJECTION, SOLUTION INTRAMUSCULAR; INTRAVENOUS at 20:09

## 2021-01-01 RX ADMIN — Medication 10 ML: at 08:06

## 2021-01-01 RX ADMIN — ACETAMINOPHEN 650 MG: 325 TABLET ORAL at 12:09

## 2021-01-01 RX ADMIN — HEPARIN SODIUM 110 UNITS/HR: 10000 INJECTION, SOLUTION INTRAVENOUS at 14:37

## 2021-01-01 RX ADMIN — SODIUM BICARBONATE: 84 INJECTION, SOLUTION INTRAVENOUS at 11:59

## 2021-01-01 RX ADMIN — PROMETHAZINE HYDROCHLORIDE 12.5 MG: 25 INJECTION INTRAMUSCULAR; INTRAVENOUS at 20:58

## 2021-01-01 RX ADMIN — Medication 2 PUFF: at 06:51

## 2021-01-01 RX ADMIN — Medication: at 21:30

## 2021-01-01 RX ADMIN — INSULIN GLARGINE 45 UNITS: 100 INJECTION, SOLUTION SUBCUTANEOUS at 09:39

## 2021-01-01 RX ADMIN — MEROPENEM 1000 MG: 1 INJECTION, POWDER, FOR SOLUTION INTRAVENOUS at 17:01

## 2021-01-01 RX ADMIN — DOCUSATE SODIUM 100 MG: 100 CAPSULE, LIQUID FILLED ORAL at 21:58

## 2021-01-01 RX ADMIN — VANCOMYCIN HYDROCHLORIDE 1000 MG: 1 INJECTION, POWDER, LYOPHILIZED, FOR SOLUTION INTRAVENOUS at 08:53

## 2021-01-01 RX ADMIN — STANDARDIZED SENNA CONCENTRATE 17.2 MG: 8.6 TABLET ORAL at 21:32

## 2021-01-01 RX ADMIN — Medication: at 14:55

## 2021-01-01 RX ADMIN — INSULIN GLARGINE 25 UNITS: 100 INJECTION, SOLUTION SUBCUTANEOUS at 09:38

## 2021-01-01 RX ADMIN — HYDROCORTISONE SODIUM SUCCINATE 100 MG: 100 INJECTION, POWDER, FOR SOLUTION INTRAMUSCULAR; INTRAVENOUS at 22:26

## 2021-01-01 RX ADMIN — INSULIN LISPRO 3 UNITS: 100 INJECTION, SOLUTION INTRAVENOUS; SUBCUTANEOUS at 11:15

## 2021-01-01 RX ADMIN — INSULIN LISPRO 3 UNITS: 100 INJECTION, SOLUTION INTRAVENOUS; SUBCUTANEOUS at 17:29

## 2021-01-01 RX ADMIN — INSULIN GLARGINE 25 UNITS: 100 INJECTION, SOLUTION SUBCUTANEOUS at 08:11

## 2021-01-01 RX ADMIN — FENTANYL CITRATE 50 MCG: 50 INJECTION INTRAMUSCULAR; INTRAVENOUS at 13:35

## 2021-01-01 RX ADMIN — FUROSEMIDE 20 MG: 20 TABLET ORAL at 18:30

## 2021-01-01 RX ADMIN — Medication 2 PUFF: at 19:25

## 2021-01-01 RX ADMIN — HEPARIN SODIUM 10 ML/HR: 10000 INJECTION, SOLUTION INTRAVENOUS at 12:10

## 2021-01-01 RX ADMIN — MAGNESIUM GLUCONATE 500 MG ORAL TABLET 400 MG: 500 TABLET ORAL at 08:50

## 2021-01-01 RX ADMIN — PANTOPRAZOLE SODIUM 40 MG: 40 TABLET, DELAYED RELEASE ORAL at 06:20

## 2021-01-01 RX ADMIN — Medication: at 00:21

## 2021-01-01 RX ADMIN — SODIUM PHOSPHATE, MONOBASIC, MONOHYDRATE 12 MMOL: 276; 142 INJECTION, SOLUTION INTRAVENOUS at 00:00

## 2021-01-01 RX ADMIN — SODIUM CHLORIDE 500 ML: 9 INJECTION, SOLUTION INTRAVENOUS at 08:50

## 2021-01-01 RX ADMIN — SODIUM BICARBONATE: 84 INJECTION, SOLUTION INTRAVENOUS at 12:36

## 2021-01-01 RX ADMIN — INSULIN LISPRO 1 UNITS: 100 INJECTION, SOLUTION INTRAVENOUS; SUBCUTANEOUS at 16:42

## 2021-01-01 RX ADMIN — NOREPINEPHRINE BITARTRATE 11 MCG/MIN: 1 INJECTION INTRAVENOUS at 13:00

## 2021-01-01 RX ADMIN — SODIUM PHOSPHATE, MONOBASIC, MONOHYDRATE 12 MMOL: 276; 142 INJECTION, SOLUTION INTRAVENOUS at 16:11

## 2021-01-01 RX ADMIN — SODIUM BICARBONATE 50 MEQ: 84 INJECTION INTRAVENOUS at 23:14

## 2021-01-01 RX ADMIN — Medication 2 PUFF: at 07:42

## 2021-01-01 RX ADMIN — HEPARIN SODIUM 4000 UNITS: 1000 INJECTION INTRAVENOUS; SUBCUTANEOUS at 12:04

## 2021-01-01 RX ADMIN — CALCIUM GLUCONATE 1000 MG: 98 INJECTION, SOLUTION INTRAVENOUS at 16:32

## 2021-01-01 RX ADMIN — VASOPRESSIN 0.03 UNITS/MIN: 20 INJECTION INTRAVENOUS at 19:30

## 2021-01-01 RX ADMIN — STANDARDIZED SENNA CONCENTRATE 17.2 MG: 8.6 TABLET ORAL at 21:40

## 2021-01-01 RX ADMIN — ALBUTEROL SULFATE 2.5 MG: 2.5 SOLUTION RESPIRATORY (INHALATION) at 08:00

## 2021-01-01 RX ADMIN — Medication 10 ML: at 21:16

## 2021-01-01 RX ADMIN — NOREPINEPHRINE BITARTRATE 6 MCG/MIN: 1 INJECTION INTRAVENOUS at 23:03

## 2021-01-01 RX ADMIN — INSULIN LISPRO 3 UNITS: 100 INJECTION, SOLUTION INTRAVENOUS; SUBCUTANEOUS at 00:02

## 2021-01-01 RX ADMIN — PANTOPRAZOLE SODIUM 40 MG: 40 INJECTION, POWDER, FOR SOLUTION INTRAVENOUS at 11:28

## 2021-01-01 RX ADMIN — HEPARIN SODIUM 1190 UNITS/HR: 10000 INJECTION, SOLUTION INTRAVENOUS at 07:21

## 2021-01-01 RX ADMIN — SODIUM BICARBONATE: 84 INJECTION, SOLUTION INTRAVENOUS at 20:28

## 2021-01-01 RX ADMIN — DOCUSATE SODIUM 100 MG: 100 CAPSULE, LIQUID FILLED ORAL at 10:05

## 2021-01-01 RX ADMIN — INSULIN LISPRO 3 UNITS: 100 INJECTION, SOLUTION INTRAVENOUS; SUBCUTANEOUS at 11:45

## 2021-01-01 RX ADMIN — MIDODRINE HYDROCHLORIDE 10 MG: 5 TABLET ORAL at 12:04

## 2021-01-01 RX ADMIN — APIXABAN 5 MG: 5 TABLET, FILM COATED ORAL at 20:35

## 2021-01-01 RX ADMIN — HEPARIN SODIUM 15.7 ML/HR: 10000 INJECTION, SOLUTION INTRAVENOUS at 17:04

## 2021-01-01 RX ADMIN — APIXABAN 5 MG: 5 TABLET, FILM COATED ORAL at 12:03

## 2021-01-01 RX ADMIN — ACETAMINOPHEN 650 MG: 325 TABLET ORAL at 19:12

## 2021-01-01 RX ADMIN — SODIUM CHLORIDE, PRESERVATIVE FREE 10 ML: 5 INJECTION INTRAVENOUS at 20:11

## 2021-01-01 RX ADMIN — PANTOPRAZOLE SODIUM 40 MG: 40 TABLET, DELAYED RELEASE ORAL at 04:40

## 2021-01-01 RX ADMIN — FUROSEMIDE 40 MG: 10 INJECTION, SOLUTION INTRAVENOUS at 14:03

## 2021-01-01 RX ADMIN — Medication 30 ML: at 20:10

## 2021-01-01 RX ADMIN — MONTELUKAST SODIUM 10 MG: 10 TABLET, COATED ORAL at 08:53

## 2021-01-01 RX ADMIN — MUPIROCIN: 20 OINTMENT TOPICAL at 22:17

## 2021-01-01 RX ADMIN — Medication 2 PUFF: at 07:59

## 2021-01-01 RX ADMIN — Medication: at 01:26

## 2021-01-01 RX ADMIN — FUROSEMIDE 20 MG: 20 TABLET ORAL at 09:17

## 2021-01-01 RX ADMIN — EPINEPHRINE 6 MCG/MIN: 1 INJECTION PARENTERAL at 10:56

## 2021-01-01 RX ADMIN — Medication: at 01:18

## 2021-01-01 RX ADMIN — TRAZODONE HYDROCHLORIDE 50 MG: 50 TABLET ORAL at 20:28

## 2021-01-01 RX ADMIN — ASPIRIN 81 MG: 81 TABLET, COATED ORAL at 21:19

## 2021-01-01 RX ADMIN — CETIRIZINE HYDROCHLORIDE 10 MG: 10 TABLET ORAL at 10:26

## 2021-01-01 RX ADMIN — GUAIFENESIN 600 MG: 600 TABLET, EXTENDED RELEASE ORAL at 15:04

## 2021-01-01 RX ADMIN — POTASSIUM CHLORIDE 20 MEQ: 1500 TABLET, EXTENDED RELEASE ORAL at 10:02

## 2021-01-01 RX ADMIN — MICONAZOLE NITRATE: 20 POWDER TOPICAL at 08:20

## 2021-01-01 RX ADMIN — MIDODRINE HYDROCHLORIDE 10 MG: 5 TABLET ORAL at 08:11

## 2021-01-01 RX ADMIN — LINEZOLID 600 MG: 600 INJECTION, SOLUTION INTRAVENOUS at 09:27

## 2021-01-01 RX ADMIN — MICONAZOLE NITRATE: 20 POWDER TOPICAL at 09:33

## 2021-01-01 RX ADMIN — GUAIFENESIN 600 MG: 600 TABLET, EXTENDED RELEASE ORAL at 21:57

## 2021-01-01 RX ADMIN — ACETAMINOPHEN 650 MG: 325 TABLET ORAL at 23:16

## 2021-01-01 RX ADMIN — Medication 2 PUFF: at 19:36

## 2021-01-01 RX ADMIN — HEPARIN SODIUM 610 UNITS/HR: 10000 INJECTION, SOLUTION INTRAVENOUS at 21:39

## 2021-01-01 RX ADMIN — MONTELUKAST SODIUM 10 MG: 10 TABLET, COATED ORAL at 20:34

## 2021-01-01 RX ADMIN — MICONAZOLE NITRATE: 20 POWDER TOPICAL at 09:00

## 2021-01-01 RX ADMIN — DEXAMETHASONE SODIUM PHOSPHATE 8 MG: 4 INJECTION, SOLUTION INTRAMUSCULAR; INTRAVENOUS at 11:05

## 2021-01-01 RX ADMIN — ASPIRIN 81 MG: 81 TABLET, COATED ORAL at 20:33

## 2021-01-01 RX ADMIN — ALBUTEROL SULFATE 2.5 MG: 2.5 SOLUTION RESPIRATORY (INHALATION) at 07:21

## 2021-01-01 RX ADMIN — MIDODRINE HYDROCHLORIDE 10 MG: 5 TABLET ORAL at 16:48

## 2021-01-01 RX ADMIN — CALCIUM GLUCONATE 1000 MG: 98 INJECTION, SOLUTION INTRAVENOUS at 15:20

## 2021-01-01 RX ADMIN — Medication: at 17:59

## 2021-01-01 RX ADMIN — CETIRIZINE HYDROCHLORIDE 10 MG: 10 TABLET ORAL at 08:40

## 2021-01-01 RX ADMIN — MIDODRINE HYDROCHLORIDE 10 MG: 5 TABLET ORAL at 16:08

## 2021-01-01 RX ADMIN — MAGNESIUM SULFATE HEPTAHYDRATE 2000 MG: 40 INJECTION, SOLUTION INTRAVENOUS at 09:20

## 2021-01-01 RX ADMIN — INSULIN GLARGINE 40 UNITS: 100 INJECTION, SOLUTION SUBCUTANEOUS at 20:32

## 2021-01-01 RX ADMIN — Medication 2 PUFF: at 19:09

## 2021-01-01 RX ADMIN — INSULIN GLARGINE 30 UNITS: 100 INJECTION, SOLUTION SUBCUTANEOUS at 09:09

## 2021-01-01 RX ADMIN — HYDROCORTISONE SODIUM SUCCINATE 50 MG: 100 INJECTION, POWDER, FOR SOLUTION INTRAMUSCULAR; INTRAVENOUS at 21:02

## 2021-01-01 RX ADMIN — TRAZODONE HYDROCHLORIDE 50 MG: 50 TABLET ORAL at 21:44

## 2021-01-01 RX ADMIN — SODIUM CHLORIDE, PRESERVATIVE FREE 10 ML: 5 INJECTION INTRAVENOUS at 08:38

## 2021-01-01 RX ADMIN — PHENYLEPHRINE HYDROCHLORIDE 200 MCG: 10 INJECTION INTRAVENOUS at 11:43

## 2021-01-01 RX ADMIN — FUROSEMIDE 80 MG: 10 INJECTION, SOLUTION INTRAMUSCULAR; INTRAVENOUS at 09:55

## 2021-01-01 RX ADMIN — TRAZODONE HYDROCHLORIDE 50 MG: 50 TABLET ORAL at 00:25

## 2021-01-01 RX ADMIN — MAGNESIUM GLUCONATE 500 MG ORAL TABLET 400 MG: 500 TABLET ORAL at 09:07

## 2021-01-01 RX ADMIN — CETIRIZINE HYDROCHLORIDE 10 MG: 10 TABLET ORAL at 10:03

## 2021-01-01 RX ADMIN — TRAZODONE HYDROCHLORIDE 50 MG: 50 TABLET ORAL at 20:20

## 2021-01-01 RX ADMIN — MEROPENEM 1000 MG: 1 INJECTION, POWDER, FOR SOLUTION INTRAVENOUS at 17:16

## 2021-01-01 RX ADMIN — VANCOMYCIN HYDROCHLORIDE 1000 MG: 1 INJECTION, POWDER, LYOPHILIZED, FOR SOLUTION INTRAVENOUS at 01:34

## 2021-01-01 RX ADMIN — ALLOPURINOL 100 MG: 100 TABLET ORAL at 12:03

## 2021-01-01 RX ADMIN — GUAIFENESIN 600 MG: 600 TABLET, EXTENDED RELEASE ORAL at 21:18

## 2021-01-01 RX ADMIN — DOCUSATE SODIUM 100 MG: 100 CAPSULE, LIQUID FILLED ORAL at 09:06

## 2021-01-01 RX ADMIN — MICONAZOLE NITRATE: 20 POWDER TOPICAL at 09:03

## 2021-01-01 RX ADMIN — Medication 10 ML: at 22:18

## 2021-01-01 RX ADMIN — CIPROFLOXACIN 500 MG: 500 TABLET, FILM COATED ORAL at 11:32

## 2021-01-01 RX ADMIN — ASPIRIN 81 MG: 81 TABLET, COATED ORAL at 21:18

## 2021-01-01 RX ADMIN — Medication: at 17:23

## 2021-01-01 RX ADMIN — FUROSEMIDE 40 MG: 10 INJECTION, SOLUTION INTRAMUSCULAR; INTRAVENOUS at 08:51

## 2021-01-01 RX ADMIN — INSULIN LISPRO 6 UNITS: 100 INJECTION, SOLUTION INTRAVENOUS; SUBCUTANEOUS at 11:14

## 2021-01-01 RX ADMIN — SODIUM CHLORIDE 25 ML: 9 INJECTION, SOLUTION INTRAVENOUS at 09:19

## 2021-01-01 RX ADMIN — INSULIN LISPRO 6 UNITS: 100 INJECTION, SOLUTION INTRAVENOUS; SUBCUTANEOUS at 00:00

## 2021-01-01 RX ADMIN — FERROUS SULFATE TAB 325 MG (65 MG ELEMENTAL FE) 325 MG: 325 (65 FE) TAB at 09:31

## 2021-01-01 RX ADMIN — FERROUS SULFATE TAB 325 MG (65 MG ELEMENTAL FE) 325 MG: 325 (65 FE) TAB at 08:52

## 2021-01-01 RX ADMIN — POTASSIUM CHLORIDE 40 MEQ: 1500 TABLET, EXTENDED RELEASE ORAL at 08:06

## 2021-01-01 RX ADMIN — GUAIFENESIN 600 MG: 600 TABLET, EXTENDED RELEASE ORAL at 21:53

## 2021-01-01 RX ADMIN — MIDODRINE HYDROCHLORIDE 10 MG: 5 TABLET ORAL at 11:28

## 2021-01-01 RX ADMIN — FERROUS SULFATE TAB 325 MG (65 MG ELEMENTAL FE) 325 MG: 325 (65 FE) TAB at 10:24

## 2021-01-01 RX ADMIN — DOCUSATE SODIUM 100 MG: 100 CAPSULE, LIQUID FILLED ORAL at 08:07

## 2021-01-01 RX ADMIN — MICONAZOLE NITRATE: 20 POWDER TOPICAL at 14:16

## 2021-01-01 RX ADMIN — HEPARIN SODIUM 110 UNITS/HR: 10000 INJECTION, SOLUTION INTRAVENOUS at 13:02

## 2021-01-01 RX ADMIN — SODIUM CHLORIDE 25 ML: 9 INJECTION, SOLUTION INTRAVENOUS at 00:47

## 2021-01-01 RX ADMIN — POTASSIUM CHLORIDE 20 MEQ: 400 INJECTION, SOLUTION INTRAVENOUS at 10:23

## 2021-01-01 RX ADMIN — ALBUTEROL SULFATE 2.5 MG: 2.5 SOLUTION RESPIRATORY (INHALATION) at 14:28

## 2021-01-01 RX ADMIN — INSULIN LISPRO 6 UNITS: 100 INJECTION, SOLUTION INTRAVENOUS; SUBCUTANEOUS at 16:35

## 2021-01-01 RX ADMIN — INSULIN GLARGINE 30 UNITS: 100 INJECTION, SOLUTION SUBCUTANEOUS at 21:44

## 2021-01-01 RX ADMIN — SODIUM CHLORIDE, PRESERVATIVE FREE 10 ML: 5 INJECTION INTRAVENOUS at 21:23

## 2021-01-01 RX ADMIN — Medication 2 PUFF: at 19:01

## 2021-01-01 RX ADMIN — Medication: at 21:45

## 2021-01-01 RX ADMIN — ASPIRIN 81 MG: 81 TABLET, COATED ORAL at 21:59

## 2021-01-01 RX ADMIN — INSULIN LISPRO 22 UNITS: 100 INJECTION, SOLUTION INTRAVENOUS; SUBCUTANEOUS at 18:42

## 2021-01-01 RX ADMIN — Medication 4 PUFF: at 10:24

## 2021-01-01 RX ADMIN — SODIUM PHOSPHATE, MONOBASIC, MONOHYDRATE 6 MMOL: 276; 142 INJECTION, SOLUTION INTRAVENOUS at 09:25

## 2021-01-01 RX ADMIN — INSULIN GLARGINE 30 UNITS: 100 INJECTION, SOLUTION SUBCUTANEOUS at 20:21

## 2021-01-01 RX ADMIN — NOREPINEPHRINE BITARTRATE 20 MCG/MIN: 1 INJECTION INTRAVENOUS at 18:27

## 2021-01-01 RX ADMIN — MIDODRINE HYDROCHLORIDE 10 MG: 5 TABLET ORAL at 17:01

## 2021-01-01 RX ADMIN — ALBUTEROL SULFATE 2.5 MG: 2.5 SOLUTION RESPIRATORY (INHALATION) at 19:36

## 2021-01-01 RX ADMIN — SODIUM CHLORIDE, PRESERVATIVE FREE 10 ML: 5 INJECTION INTRAVENOUS at 10:29

## 2021-01-01 RX ADMIN — MICONAZOLE NITRATE: 20 POWDER TOPICAL at 19:58

## 2021-01-01 RX ADMIN — METOPROLOL SUCCINATE 50 MG: 50 TABLET, EXTENDED RELEASE ORAL at 10:03

## 2021-01-01 RX ADMIN — FUROSEMIDE 20 MG/HR: 100 INJECTION, SOLUTION INTRAMUSCULAR; INTRAVENOUS at 10:39

## 2021-01-01 RX ADMIN — ASPIRIN 81 MG: 81 TABLET, COATED ORAL at 20:56

## 2021-01-01 RX ADMIN — HEPARIN SODIUM 1480 UNITS/HR: 10000 INJECTION, SOLUTION INTRAVENOUS at 09:27

## 2021-01-01 RX ADMIN — STANDARDIZED SENNA CONCENTRATE 17.2 MG: 8.6 TABLET ORAL at 21:53

## 2021-01-01 RX ADMIN — GUAIFENESIN 600 MG: 600 TABLET, EXTENDED RELEASE ORAL at 08:05

## 2021-01-01 RX ADMIN — ASPIRIN 81 MG: 81 TABLET, CHEWABLE ORAL at 21:00

## 2021-01-01 RX ADMIN — INSULIN LISPRO 6 UNITS: 100 INJECTION, SOLUTION INTRAVENOUS; SUBCUTANEOUS at 19:42

## 2021-01-01 RX ADMIN — APIXABAN 2.5 MG: 5 TABLET, FILM COATED ORAL at 19:47

## 2021-01-01 RX ADMIN — ASPIRIN 81 MG: 81 TABLET, COATED ORAL at 21:09

## 2021-01-01 RX ADMIN — ASPIRIN 81 MG: 81 TABLET, CHEWABLE ORAL at 20:29

## 2021-01-01 RX ADMIN — Medication: at 11:42

## 2021-01-01 RX ADMIN — INSULIN GLARGINE 40 UNITS: 100 INJECTION, SOLUTION SUBCUTANEOUS at 21:23

## 2021-01-01 RX ADMIN — MEROPENEM 1000 MG: 1 INJECTION, POWDER, FOR SOLUTION INTRAVENOUS at 16:40

## 2021-01-01 RX ADMIN — Medication 10 ML: at 08:10

## 2021-01-01 RX ADMIN — ALBUTEROL SULFATE 2.5 MG: 2.5 SOLUTION RESPIRATORY (INHALATION) at 11:28

## 2021-01-01 RX ADMIN — INSULIN LISPRO 3 UNITS: 100 INJECTION, SOLUTION INTRAVENOUS; SUBCUTANEOUS at 16:59

## 2021-01-01 RX ADMIN — MICONAZOLE NITRATE: 20 POWDER TOPICAL at 14:56

## 2021-01-01 RX ADMIN — FERROUS SULFATE TAB 325 MG (65 MG ELEMENTAL FE) 325 MG: 325 (65 FE) TAB at 12:03

## 2021-01-01 RX ADMIN — MONTELUKAST SODIUM 10 MG: 10 TABLET, COATED ORAL at 11:07

## 2021-01-01 RX ADMIN — HEPARIN SODIUM 3000 UNITS: 1000 INJECTION INTRAVENOUS; SUBCUTANEOUS at 14:33

## 2021-01-01 RX ADMIN — Medication 2 PUFF: at 19:07

## 2021-01-01 RX ADMIN — SODIUM BICARBONATE: 84 INJECTION, SOLUTION INTRAVENOUS at 04:19

## 2021-01-01 RX ADMIN — Medication: at 21:15

## 2021-01-01 RX ADMIN — Medication: at 12:30

## 2021-01-01 RX ADMIN — SODIUM CHLORIDE, PRESERVATIVE FREE 10 ML: 5 INJECTION INTRAVENOUS at 09:03

## 2021-01-01 RX ADMIN — INSULIN GLARGINE 15 UNITS: 100 INJECTION, SOLUTION SUBCUTANEOUS at 09:37

## 2021-01-01 RX ADMIN — Medication: at 02:09

## 2021-01-01 RX ADMIN — MIDODRINE HYDROCHLORIDE 10 MG: 5 TABLET ORAL at 10:07

## 2021-01-01 RX ADMIN — INSULIN LISPRO 3 UNITS: 100 INJECTION, SOLUTION INTRAVENOUS; SUBCUTANEOUS at 16:48

## 2021-01-01 RX ADMIN — Medication 2 PUFF: at 07:30

## 2021-01-01 RX ADMIN — ALBUTEROL SULFATE 2.5 MG: 2.5 SOLUTION RESPIRATORY (INHALATION) at 19:08

## 2021-01-01 RX ADMIN — PANTOPRAZOLE SODIUM 40 MG: 40 TABLET, DELAYED RELEASE ORAL at 06:17

## 2021-01-01 RX ADMIN — SODIUM CHLORIDE, PRESERVATIVE FREE 10 ML: 5 INJECTION INTRAVENOUS at 08:51

## 2021-01-01 RX ADMIN — ACETAMINOPHEN 650 MG: 325 TABLET ORAL at 21:56

## 2021-01-01 RX ADMIN — Medication 20 MEQ: at 15:40

## 2021-01-01 RX ADMIN — MAGNESIUM GLUCONATE 500 MG ORAL TABLET 1200 MG: 500 TABLET ORAL at 08:53

## 2021-01-01 RX ADMIN — Medication: at 07:36

## 2021-01-01 RX ADMIN — INSULIN LISPRO 6 UNITS: 100 INJECTION, SOLUTION INTRAVENOUS; SUBCUTANEOUS at 00:19

## 2021-01-01 RX ADMIN — Medication 2 PUFF: at 18:58

## 2021-01-01 RX ADMIN — INSULIN LISPRO 22 UNITS: 100 INJECTION, SOLUTION INTRAVENOUS; SUBCUTANEOUS at 09:09

## 2021-01-01 RX ADMIN — CETIRIZINE HYDROCHLORIDE 10 MG: 10 TABLET ORAL at 11:07

## 2021-01-01 RX ADMIN — METOPROLOL SUCCINATE 50 MG: 50 TABLET, EXTENDED RELEASE ORAL at 20:29

## 2021-01-01 RX ADMIN — NOREPINEPHRINE BITARTRATE 3 MCG/MIN: 1 INJECTION INTRAVENOUS at 01:38

## 2021-01-01 RX ADMIN — SUGAMMADEX 300 MG: 100 INJECTION, SOLUTION INTRAVENOUS at 12:07

## 2021-01-01 RX ADMIN — MIDODRINE HYDROCHLORIDE 10 MG: 5 TABLET ORAL at 09:41

## 2021-01-01 RX ADMIN — Medication: at 17:31

## 2021-01-01 RX ADMIN — MEROPENEM 1000 MG: 1 INJECTION, POWDER, FOR SOLUTION INTRAVENOUS at 00:46

## 2021-01-01 RX ADMIN — SODIUM CHLORIDE 50 ML: 9 INJECTION, SOLUTION INTRAVENOUS at 17:06

## 2021-01-01 RX ADMIN — Medication 10 ML: at 21:59

## 2021-01-01 RX ADMIN — Medication 2 PUFF: at 08:04

## 2021-01-01 RX ADMIN — PANTOPRAZOLE SODIUM 40 MG: 40 TABLET, DELAYED RELEASE ORAL at 20:34

## 2021-01-01 RX ADMIN — Medication: at 22:18

## 2021-01-01 RX ADMIN — SODIUM CHLORIDE 25 ML: 9 INJECTION, SOLUTION INTRAVENOUS at 09:16

## 2021-01-01 RX ADMIN — Medication 2 PUFF: at 08:23

## 2021-01-01 RX ADMIN — DOCUSATE SODIUM 100 MG: 100 CAPSULE, LIQUID FILLED ORAL at 22:35

## 2021-01-01 RX ADMIN — GUAIFENESIN 600 MG: 600 TABLET, EXTENDED RELEASE ORAL at 09:06

## 2021-01-01 RX ADMIN — INSULIN GLARGINE 30 UNITS: 100 INJECTION, SOLUTION SUBCUTANEOUS at 22:14

## 2021-01-01 RX ADMIN — Medication: at 22:55

## 2021-01-01 RX ADMIN — MAGNESIUM GLUCONATE 500 MG ORAL TABLET 400 MG: 500 TABLET ORAL at 09:18

## 2021-01-01 RX ADMIN — HEPARIN SODIUM 2600 UNITS: 1000 INJECTION INTRAVENOUS; SUBCUTANEOUS at 10:55

## 2021-01-01 RX ADMIN — Medication: at 16:23

## 2021-01-01 RX ADMIN — POTASSIUM CHLORIDE 10 MEQ: 7.46 INJECTION, SOLUTION INTRAVENOUS at 06:35

## 2021-01-01 RX ADMIN — PANTOPRAZOLE SODIUM 40 MG: 40 INJECTION, POWDER, FOR SOLUTION INTRAVENOUS at 07:44

## 2021-01-01 RX ADMIN — METOPROLOL SUCCINATE 50 MG: 50 TABLET, EXTENDED RELEASE ORAL at 08:52

## 2021-01-01 RX ADMIN — ALBUTEROL SULFATE 2.5 MG: 2.5 SOLUTION RESPIRATORY (INHALATION) at 07:05

## 2021-01-01 RX ADMIN — MEROPENEM 1000 MG: 1 INJECTION, POWDER, FOR SOLUTION INTRAVENOUS at 08:26

## 2021-01-01 RX ADMIN — Medication: at 17:04

## 2021-01-01 RX ADMIN — CETIRIZINE HYDROCHLORIDE 10 MG: 10 TABLET ORAL at 08:52

## 2021-01-01 RX ADMIN — DOCUSATE SODIUM 200 MG: 100 CAPSULE ORAL at 08:53

## 2021-01-01 RX ADMIN — INSULIN GLARGINE 15 UNITS: 100 INJECTION, SOLUTION SUBCUTANEOUS at 10:12

## 2021-01-01 RX ADMIN — ALBUTEROL SULFATE 2.5 MG: 2.5 SOLUTION RESPIRATORY (INHALATION) at 19:26

## 2021-01-01 RX ADMIN — Medication: at 14:07

## 2021-01-01 RX ADMIN — INSULIN GLARGINE 15 UNITS: 100 INJECTION, SOLUTION SUBCUTANEOUS at 21:19

## 2021-01-01 RX ADMIN — PANTOPRAZOLE SODIUM 40 MG: 40 INJECTION, POWDER, FOR SOLUTION INTRAVENOUS at 07:58

## 2021-01-01 RX ADMIN — APIXABAN 2.5 MG: 5 TABLET, FILM COATED ORAL at 09:18

## 2021-01-01 RX ADMIN — Medication 2 PUFF: at 22:59

## 2021-01-01 RX ADMIN — SODIUM CHLORIDE 100 ML: 9 INJECTION, SOLUTION INTRAVENOUS at 23:21

## 2021-01-01 RX ADMIN — HEPARIN SODIUM 1570 UNITS/HR: 10000 INJECTION, SOLUTION INTRAVENOUS at 23:03

## 2021-01-01 RX ADMIN — APIXABAN 2.5 MG: 5 TABLET, FILM COATED ORAL at 10:24

## 2021-01-01 RX ADMIN — TRAZODONE HYDROCHLORIDE 50 MG: 50 TABLET ORAL at 22:37

## 2021-01-01 RX ADMIN — MEROPENEM 500 MG: 500 INJECTION, POWDER, FOR SOLUTION INTRAVENOUS at 16:06

## 2021-01-01 RX ADMIN — FUROSEMIDE 20 MG/HR: 100 INJECTION, SOLUTION INTRAMUSCULAR; INTRAVENOUS at 21:31

## 2021-01-01 RX ADMIN — MAGNESIUM GLUCONATE 500 MG ORAL TABLET 400 MG: 500 TABLET ORAL at 19:47

## 2021-01-01 RX ADMIN — INSULIN LISPRO 5 UNITS: 100 INJECTION, SOLUTION INTRAVENOUS; SUBCUTANEOUS at 09:34

## 2021-01-01 RX ADMIN — SODIUM CHLORIDE, PRESERVATIVE FREE 10 ML: 5 INJECTION INTRAVENOUS at 21:11

## 2021-01-01 RX ADMIN — ALBUTEROL SULFATE 2.5 MG: 2.5 SOLUTION RESPIRATORY (INHALATION) at 08:28

## 2021-01-01 RX ADMIN — MAGNESIUM GLUCONATE 500 MG ORAL TABLET 400 MG: 500 TABLET ORAL at 21:32

## 2021-01-01 RX ADMIN — DOCUSATE SODIUM 100 MG: 100 CAPSULE, LIQUID FILLED ORAL at 10:26

## 2021-01-01 RX ADMIN — MICONAZOLE NITRATE: 20 POWDER TOPICAL at 21:53

## 2021-01-01 RX ADMIN — SODIUM CHLORIDE 25 ML: 9 INJECTION, SOLUTION INTRAVENOUS at 10:21

## 2021-01-01 RX ADMIN — MEROPENEM 1000 MG: 1 INJECTION, POWDER, FOR SOLUTION INTRAVENOUS at 00:48

## 2021-01-01 RX ADMIN — FERROUS SULFATE TAB 325 MG (65 MG ELEMENTAL FE) 325 MG: 325 (65 FE) TAB at 08:58

## 2021-01-01 RX ADMIN — GUAIFENESIN 600 MG: 600 TABLET, EXTENDED RELEASE ORAL at 21:56

## 2021-01-01 RX ADMIN — HYDROCORTISONE SODIUM SUCCINATE 100 MG: 100 INJECTION, POWDER, FOR SOLUTION INTRAMUSCULAR; INTRAVENOUS at 19:51

## 2021-01-01 RX ADMIN — INSULIN GLARGINE 25 UNITS: 100 INJECTION, SOLUTION SUBCUTANEOUS at 09:55

## 2021-01-01 RX ADMIN — MICONAZOLE NITRATE: 20 POWDER TOPICAL at 08:41

## 2021-01-01 RX ADMIN — SODIUM PHOSPHATE, MONOBASIC, MONOHYDRATE 18 MMOL: 276; 142 INJECTION, SOLUTION INTRAVENOUS at 16:31

## 2021-01-01 RX ADMIN — ALBUTEROL SULFATE 2.5 MG: 2.5 SOLUTION RESPIRATORY (INHALATION) at 19:04

## 2021-01-01 RX ADMIN — INSULIN LISPRO 20 UNITS: 100 INJECTION, SOLUTION INTRAVENOUS; SUBCUTANEOUS at 11:54

## 2021-01-01 RX ADMIN — MONTELUKAST SODIUM 10 MG: 10 TABLET, COATED ORAL at 10:24

## 2021-01-01 RX ADMIN — FUROSEMIDE 40 MG: 10 INJECTION, SOLUTION INTRAMUSCULAR; INTRAVENOUS at 20:37

## 2021-01-01 RX ADMIN — GUAIFENESIN 600 MG: 600 TABLET, EXTENDED RELEASE ORAL at 21:29

## 2021-01-01 RX ADMIN — POTASSIUM CHLORIDE 20 MEQ: 29.8 INJECTION, SOLUTION INTRAVENOUS at 20:08

## 2021-01-01 RX ADMIN — Medication: at 22:19

## 2021-01-01 RX ADMIN — MEROPENEM 1000 MG: 1 INJECTION, POWDER, FOR SOLUTION INTRAVENOUS at 08:47

## 2021-01-01 RX ADMIN — INSULIN GLARGINE 20 UNITS: 100 INJECTION, SOLUTION SUBCUTANEOUS at 20:54

## 2021-01-01 RX ADMIN — ACETAMINOPHEN 1000 MG: 10 INJECTION, SOLUTION INTRAVENOUS at 15:15

## 2021-01-01 RX ADMIN — Medication: at 11:28

## 2021-01-01 RX ADMIN — Medication: at 04:55

## 2021-01-01 RX ADMIN — INSULIN LISPRO 1 UNITS: 100 INJECTION, SOLUTION INTRAVENOUS; SUBCUTANEOUS at 22:13

## 2021-01-01 RX ADMIN — INSULIN LISPRO 6 UNITS: 100 INJECTION, SOLUTION INTRAVENOUS; SUBCUTANEOUS at 12:00

## 2021-01-01 RX ADMIN — INSULIN LISPRO 2 UNITS: 100 INJECTION, SOLUTION INTRAVENOUS; SUBCUTANEOUS at 09:21

## 2021-01-01 RX ADMIN — POTASSIUM CHLORIDE 40 MEQ: 1500 TABLET, EXTENDED RELEASE ORAL at 08:50

## 2021-01-01 RX ADMIN — ASPIRIN 81 MG: 81 TABLET, COATED ORAL at 19:41

## 2021-01-01 RX ADMIN — Medication 2 PUFF: at 07:00

## 2021-01-01 RX ADMIN — MEROPENEM 1000 MG: 1 INJECTION, POWDER, FOR SOLUTION INTRAVENOUS at 19:46

## 2021-01-01 RX ADMIN — SODIUM BICARBONATE 50 MEQ: 84 INJECTION INTRAVENOUS at 16:29

## 2021-01-01 RX ADMIN — ACETAMINOPHEN 650 MG: 325 TABLET ORAL at 22:45

## 2021-01-01 RX ADMIN — PANTOPRAZOLE SODIUM 40 MG: 40 INJECTION, POWDER, FOR SOLUTION INTRAVENOUS at 09:41

## 2021-01-01 RX ADMIN — FUROSEMIDE 20 MG/HR: 100 INJECTION, SOLUTION INTRAMUSCULAR; INTRAVENOUS at 02:07

## 2021-01-01 RX ADMIN — INSULIN GLARGINE 45 UNITS: 100 INJECTION, SOLUTION SUBCUTANEOUS at 20:38

## 2021-01-01 RX ADMIN — Medication 10 ML: at 19:50

## 2021-01-01 RX ADMIN — HEPARIN SODIUM 2000 UNITS: 1000 INJECTION INTRAVENOUS; SUBCUTANEOUS at 20:14

## 2021-01-01 RX ADMIN — ALBUTEROL SULFATE 2.5 MG: 2.5 SOLUTION RESPIRATORY (INHALATION) at 12:34

## 2021-01-01 RX ADMIN — ASPIRIN 81 MG: 81 TABLET, CHEWABLE ORAL at 21:40

## 2021-01-01 RX ADMIN — EPINEPHRINE 20 MCG/MIN: 1 INJECTION PARENTERAL at 19:29

## 2021-01-01 RX ADMIN — FERROUS SULFATE TAB 325 MG (65 MG ELEMENTAL FE) 325 MG: 325 (65 FE) TAB at 08:38

## 2021-01-01 RX ADMIN — INSULIN GLARGINE 10 UNITS: 100 INJECTION, SOLUTION SUBCUTANEOUS at 21:58

## 2021-01-01 RX ADMIN — ASPIRIN 81 MG: 81 TABLET, CHEWABLE ORAL at 20:37

## 2021-01-01 RX ADMIN — SODIUM CHLORIDE 25 ML: 9 INJECTION, SOLUTION INTRAVENOUS at 09:07

## 2021-01-01 RX ADMIN — PANTOPRAZOLE SODIUM 40 MG: 40 TABLET, DELAYED RELEASE ORAL at 08:47

## 2021-01-01 RX ADMIN — GUAIFENESIN 600 MG: 600 TABLET, EXTENDED RELEASE ORAL at 19:00

## 2021-01-01 RX ADMIN — INSULIN GLARGINE 25 UNITS: 100 INJECTION, SOLUTION SUBCUTANEOUS at 08:59

## 2021-01-01 RX ADMIN — PANTOPRAZOLE SODIUM 40 MG: 40 INJECTION, POWDER, FOR SOLUTION INTRAVENOUS at 09:33

## 2021-01-01 RX ADMIN — ALBUTEROL SULFATE 2.5 MG: 2.5 SOLUTION RESPIRATORY (INHALATION) at 13:05

## 2021-01-01 RX ADMIN — FUROSEMIDE 10 MG/HR: 100 INJECTION, SOLUTION INTRAMUSCULAR; INTRAVENOUS at 12:04

## 2021-01-01 RX ADMIN — MAGNESIUM GLUCONATE 500 MG ORAL TABLET 400 MG: 500 TABLET ORAL at 21:53

## 2021-01-01 RX ADMIN — HYDROCORTISONE SODIUM SUCCINATE 50 MG: 100 INJECTION, POWDER, FOR SOLUTION INTRAMUSCULAR; INTRAVENOUS at 21:20

## 2021-01-01 RX ADMIN — MUPIROCIN: 20 OINTMENT TOPICAL at 20:22

## 2021-01-01 RX ADMIN — Medication 2 PUFF: at 20:44

## 2021-01-01 RX ADMIN — CETIRIZINE HYDROCHLORIDE 10 MG: 10 TABLET ORAL at 08:53

## 2021-01-01 RX ADMIN — GUAIFENESIN 600 MG: 600 TABLET, EXTENDED RELEASE ORAL at 20:37

## 2021-01-01 RX ADMIN — INSULIN LISPRO 3 UNITS: 100 INJECTION, SOLUTION INTRAVENOUS; SUBCUTANEOUS at 21:19

## 2021-01-01 RX ADMIN — STANDARDIZED SENNA CONCENTRATE 17.2 MG: 8.6 TABLET ORAL at 20:50

## 2021-01-01 RX ADMIN — INSULIN LISPRO 9 UNITS: 100 INJECTION, SOLUTION INTRAVENOUS; SUBCUTANEOUS at 20:27

## 2021-01-01 RX ADMIN — Medication: at 06:48

## 2021-01-01 RX ADMIN — POTASSIUM CHLORIDE 20 MEQ: 1500 TABLET, EXTENDED RELEASE ORAL at 14:40

## 2021-01-01 RX ADMIN — ONDANSETRON 4 MG: 2 INJECTION, SOLUTION INTRAMUSCULAR; INTRAVENOUS at 12:05

## 2021-01-01 RX ADMIN — INSULIN GLARGINE 10 UNITS: 100 INJECTION, SOLUTION SUBCUTANEOUS at 12:00

## 2021-01-01 RX ADMIN — APIXABAN 2.5 MG: 5 TABLET, FILM COATED ORAL at 09:46

## 2021-01-01 RX ADMIN — CALCIUM GLUCONATE 1000 MG: 98 INJECTION, SOLUTION INTRAVENOUS at 14:47

## 2021-01-01 RX ADMIN — VASOPRESSIN 0.03 UNITS/MIN: 20 INJECTION INTRAVENOUS at 18:27

## 2021-01-01 RX ADMIN — SODIUM PHOSPHATE, MONOBASIC, MONOHYDRATE 18 MMOL: 276; 142 INJECTION, SOLUTION INTRAVENOUS at 18:06

## 2021-01-01 RX ADMIN — ALBUTEROL SULFATE 2.5 MG: 2.5 SOLUTION RESPIRATORY (INHALATION) at 21:13

## 2021-01-01 RX ADMIN — APIXABAN 2.5 MG: 5 TABLET, FILM COATED ORAL at 13:16

## 2021-01-01 RX ADMIN — CALCIUM GLUCONATE 1000 MG: 98 INJECTION, SOLUTION INTRAVENOUS at 23:14

## 2021-01-01 RX ADMIN — POTASSIUM CHLORIDE 10 MEQ: 7.46 INJECTION, SOLUTION INTRAVENOUS at 11:08

## 2021-01-01 RX ADMIN — FERROUS SULFATE TAB 325 MG (65 MG ELEMENTAL FE) 325 MG: 325 (65 FE) TAB at 10:30

## 2021-01-01 RX ADMIN — MUPIROCIN: 20 OINTMENT TOPICAL at 20:58

## 2021-01-01 RX ADMIN — INSULIN GLARGINE 30 UNITS: 100 INJECTION, SOLUTION SUBCUTANEOUS at 22:46

## 2021-01-01 RX ADMIN — INSULIN LISPRO 3 UNITS: 100 INJECTION, SOLUTION INTRAVENOUS; SUBCUTANEOUS at 00:40

## 2021-01-01 RX ADMIN — STANDARDIZED SENNA CONCENTRATE 17.2 MG: 8.6 TABLET ORAL at 21:57

## 2021-01-01 RX ADMIN — GUAIFENESIN 600 MG: 600 TABLET, EXTENDED RELEASE ORAL at 09:47

## 2021-01-01 RX ADMIN — POTASSIUM CHLORIDE 40 MEQ: 1500 TABLET, EXTENDED RELEASE ORAL at 09:32

## 2021-01-01 RX ADMIN — POTASSIUM CHLORIDE 20 MEQ: 1500 TABLET, EXTENDED RELEASE ORAL at 21:57

## 2021-01-01 RX ADMIN — INSULIN LISPRO 3 UNITS: 100 INJECTION, SOLUTION INTRAVENOUS; SUBCUTANEOUS at 11:52

## 2021-01-01 RX ADMIN — MUPIROCIN: 20 OINTMENT TOPICAL at 08:28

## 2021-01-01 RX ADMIN — ASPIRIN 81 MG: 81 TABLET, CHEWABLE ORAL at 20:09

## 2021-01-01 RX ADMIN — HYDROCORTISONE SODIUM SUCCINATE 100 MG: 100 INJECTION, POWDER, FOR SOLUTION INTRAMUSCULAR; INTRAVENOUS at 17:52

## 2021-01-01 RX ADMIN — POTASSIUM CHLORIDE 20 MEQ: 1500 TABLET, EXTENDED RELEASE ORAL at 10:24

## 2021-01-01 RX ADMIN — MONTELUKAST SODIUM 10 MG: 10 TABLET, COATED ORAL at 09:46

## 2021-01-01 RX ADMIN — PANTOPRAZOLE SODIUM 40 MG: 40 INJECTION, POWDER, FOR SOLUTION INTRAVENOUS at 08:39

## 2021-01-01 RX ADMIN — GUAIFENESIN 600 MG: 600 TABLET, EXTENDED RELEASE ORAL at 10:26

## 2021-01-01 RX ADMIN — FUROSEMIDE 10 MG/HR: 100 INJECTION, SOLUTION INTRAMUSCULAR; INTRAVENOUS at 16:57

## 2021-01-01 RX ADMIN — FUROSEMIDE 20 MG/HR: 100 INJECTION, SOLUTION INTRAMUSCULAR; INTRAVENOUS at 14:56

## 2021-01-01 RX ADMIN — ALBUMIN (HUMAN) 25 G: 0.25 INJECTION, SOLUTION INTRAVENOUS at 08:47

## 2021-01-01 RX ADMIN — FUROSEMIDE 40 MG: 10 INJECTION, SOLUTION INTRAMUSCULAR; INTRAVENOUS at 15:20

## 2021-01-01 RX ADMIN — METOPROLOL SUCCINATE 50 MG: 50 TABLET, EXTENDED RELEASE ORAL at 09:47

## 2021-01-01 RX ADMIN — Medication: at 09:15

## 2021-01-01 RX ADMIN — TORSEMIDE 60 MG: 20 TABLET ORAL at 08:40

## 2021-01-01 RX ADMIN — ALBUTEROL SULFATE 2.5 MG: 2.5 SOLUTION RESPIRATORY (INHALATION) at 19:34

## 2021-01-01 RX ADMIN — MAGNESIUM SULFATE HEPTAHYDRATE 2000 MG: 40 INJECTION, SOLUTION INTRAVENOUS at 09:25

## 2021-01-01 RX ADMIN — Medication 10 ML: at 20:56

## 2021-01-01 RX ADMIN — CALCIUM GLUCONATE 1000 MG: 98 INJECTION, SOLUTION INTRAVENOUS at 06:42

## 2021-01-01 RX ADMIN — INSULIN GLARGINE 30 UNITS: 100 INJECTION, SOLUTION SUBCUTANEOUS at 21:57

## 2021-01-01 RX ADMIN — APIXABAN 2.5 MG: 5 TABLET, FILM COATED ORAL at 12:04

## 2021-01-01 RX ADMIN — FUROSEMIDE 20 MG/HR: 100 INJECTION, SOLUTION INTRAMUSCULAR; INTRAVENOUS at 00:37

## 2021-01-01 RX ADMIN — GUAIFENESIN 600 MG: 600 TABLET, EXTENDED RELEASE ORAL at 09:18

## 2021-01-01 RX ADMIN — PANTOPRAZOLE SODIUM 40 MG: 40 INJECTION, POWDER, FOR SOLUTION INTRAVENOUS at 08:26

## 2021-01-01 RX ADMIN — MAGNESIUM GLUCONATE 500 MG ORAL TABLET 400 MG: 500 TABLET ORAL at 21:00

## 2021-01-01 RX ADMIN — INSULIN LISPRO 2 UNITS: 100 INJECTION, SOLUTION INTRAVENOUS; SUBCUTANEOUS at 17:59

## 2021-01-01 RX ADMIN — ALBUTEROL SULFATE 2.5 MG: 2.5 SOLUTION RESPIRATORY (INHALATION) at 13:36

## 2021-01-01 RX ADMIN — FERROUS SULFATE TAB 325 MG (65 MG ELEMENTAL FE) 325 MG: 325 (65 FE) TAB at 09:18

## 2021-01-01 RX ADMIN — Medication 10 ML: at 20:57

## 2021-01-01 RX ADMIN — POTASSIUM CHLORIDE 40 MEQ: 1500 TABLET, EXTENDED RELEASE ORAL at 09:19

## 2021-01-01 RX ADMIN — CETIRIZINE HYDROCHLORIDE 10 MG: 10 TABLET ORAL at 09:17

## 2021-01-01 RX ADMIN — ALBUTEROL SULFATE 2.5 MG: 2.5 SOLUTION RESPIRATORY (INHALATION) at 20:01

## 2021-01-01 RX ADMIN — HEPARIN SODIUM 3600 UNITS: 1000 INJECTION INTRAVENOUS; SUBCUTANEOUS at 12:53

## 2021-01-01 RX ADMIN — APIXABAN 2.5 MG: 5 TABLET, FILM COATED ORAL at 20:10

## 2021-01-01 RX ADMIN — INSULIN LISPRO 3 UNITS: 100 INJECTION, SOLUTION INTRAVENOUS; SUBCUTANEOUS at 05:38

## 2021-01-01 RX ADMIN — MAGNESIUM GLUCONATE 500 MG ORAL TABLET 400 MG: 500 TABLET ORAL at 10:25

## 2021-01-01 RX ADMIN — Medication 2 PUFF: at 05:03

## 2021-01-01 RX ADMIN — PROPOFOL 200 MG: 10 INJECTION, EMULSION INTRAVENOUS at 11:05

## 2021-01-01 RX ADMIN — ALBUTEROL SULFATE 2.5 MG: 2.5 SOLUTION RESPIRATORY (INHALATION) at 13:12

## 2021-01-01 RX ADMIN — SODIUM CHLORIDE, PRESERVATIVE FREE 10 ML: 5 INJECTION INTRAVENOUS at 12:03

## 2021-01-01 RX ADMIN — INSULIN LISPRO 20 UNITS: 100 INJECTION, SOLUTION INTRAVENOUS; SUBCUTANEOUS at 08:43

## 2021-01-01 RX ADMIN — ALBUTEROL SULFATE 2.5 MG: 2.5 SOLUTION RESPIRATORY (INHALATION) at 07:57

## 2021-01-01 RX ADMIN — STANDARDIZED SENNA CONCENTRATE 17.2 MG: 8.6 TABLET ORAL at 21:29

## 2021-01-01 RX ADMIN — MONTELUKAST SODIUM 10 MG: 10 TABLET, COATED ORAL at 09:17

## 2021-01-01 RX ADMIN — METOPROLOL SUCCINATE 50 MG: 50 TABLET, EXTENDED RELEASE ORAL at 09:30

## 2021-01-01 RX ADMIN — SODIUM PHOSPHATE, MONOBASIC, MONOHYDRATE 12 MMOL: 276; 142 INJECTION, SOLUTION INTRAVENOUS at 23:42

## 2021-01-01 RX ADMIN — DOCUSATE SODIUM 100 MG: 100 CAPSULE, LIQUID FILLED ORAL at 09:00

## 2021-01-01 RX ADMIN — HEPARIN SODIUM 2000 UNITS: 1000 INJECTION INTRAVENOUS; SUBCUTANEOUS at 21:36

## 2021-01-01 RX ADMIN — FERROUS SULFATE TAB 325 MG (65 MG ELEMENTAL FE) 325 MG: 325 (65 FE) TAB at 08:50

## 2021-01-01 RX ADMIN — INSULIN GLARGINE 25 UNITS: 100 INJECTION, SOLUTION SUBCUTANEOUS at 20:47

## 2021-01-01 RX ADMIN — DOCUSATE SODIUM 100 MG: 100 CAPSULE, LIQUID FILLED ORAL at 20:57

## 2021-01-01 RX ADMIN — NOREPINEPHRINE BITARTRATE 46 MCG/MIN: 1 INJECTION INTRAVENOUS at 09:24

## 2021-01-01 RX ADMIN — SODIUM PHOSPHATE, MONOBASIC, MONOHYDRATE 6 MMOL: 276; 142 INJECTION, SOLUTION INTRAVENOUS at 10:09

## 2021-01-01 RX ADMIN — ACETAMINOPHEN 1000 MG: 500 TABLET ORAL at 17:44

## 2021-01-01 RX ADMIN — GUAIFENESIN 600 MG: 600 TABLET, EXTENDED RELEASE ORAL at 13:16

## 2021-01-01 RX ADMIN — CETIRIZINE HYDROCHLORIDE 10 MG: 10 TABLET ORAL at 09:30

## 2021-01-01 RX ADMIN — Medication 2 PUFF: at 09:36

## 2021-01-01 RX ADMIN — PANTOPRAZOLE SODIUM 40 MG: 40 INJECTION, POWDER, FOR SOLUTION INTRAVENOUS at 09:28

## 2021-01-01 RX ADMIN — INSULIN LISPRO 3 UNITS: 100 INJECTION, SOLUTION INTRAVENOUS; SUBCUTANEOUS at 15:50

## 2021-01-01 RX ADMIN — NOREPINEPHRINE BITARTRATE 12 MCG/MIN: 1 INJECTION INTRAVENOUS at 10:17

## 2021-01-01 RX ADMIN — Medication 10 ML: at 20:19

## 2021-01-01 RX ADMIN — DIBASIC SODIUM PHOSPHATE, MONOBASIC POTASSIUM PHOSPHATE AND MONOBASIC SODIUM PHOSPHATE 4 TABLET: 852; 155; 130 TABLET ORAL at 14:49

## 2021-01-01 RX ADMIN — MONTELUKAST SODIUM 10 MG: 10 TABLET, COATED ORAL at 22:36

## 2021-01-01 RX ADMIN — Medication: at 02:44

## 2021-01-01 RX ADMIN — POTASSIUM CHLORIDE 20 MEQ: 1500 TABLET, EXTENDED RELEASE ORAL at 20:35

## 2021-01-01 RX ADMIN — MAGNESIUM GLUCONATE 500 MG ORAL TABLET 400 MG: 500 TABLET ORAL at 11:07

## 2021-01-01 RX ADMIN — MICONAZOLE NITRATE: 20 POWDER TOPICAL at 21:30

## 2021-01-01 RX ADMIN — ACETAMINOPHEN 650 MG: 325 TABLET ORAL at 09:50

## 2021-01-01 RX ADMIN — Medication: at 23:47

## 2021-01-01 RX ADMIN — SODIUM PHOSPHATE, MONOBASIC, MONOHYDRATE 12 MMOL: 276; 142 INJECTION, SOLUTION INTRAVENOUS at 15:19

## 2021-01-01 RX ADMIN — Medication: at 04:46

## 2021-01-01 RX ADMIN — CALCIUM GLUCONATE 1000 MG: 98 INJECTION, SOLUTION INTRAVENOUS at 07:19

## 2021-01-01 RX ADMIN — NOREPINEPHRINE BITARTRATE 50 MCG/MIN: 1 INJECTION INTRAVENOUS at 21:46

## 2021-01-01 RX ADMIN — MUPIROCIN: 20 OINTMENT TOPICAL at 08:25

## 2021-01-01 RX ADMIN — VANCOMYCIN HYDROCHLORIDE 1500 MG: 10 INJECTION, POWDER, LYOPHILIZED, FOR SOLUTION INTRAVENOUS at 22:42

## 2021-01-01 RX ADMIN — Medication 20 MEQ: at 16:49

## 2021-01-01 RX ADMIN — MEROPENEM 1000 MG: 1 INJECTION, POWDER, FOR SOLUTION INTRAVENOUS at 22:12

## 2021-01-01 RX ADMIN — PANTOPRAZOLE SODIUM 40 MG: 40 TABLET, DELAYED RELEASE ORAL at 05:47

## 2021-01-01 RX ADMIN — INSULIN LISPRO 6 UNITS: 100 INJECTION, SOLUTION INTRAVENOUS; SUBCUTANEOUS at 16:57

## 2021-01-01 RX ADMIN — ACETAMINOPHEN 650 MG: 325 TABLET ORAL at 12:03

## 2021-01-01 RX ADMIN — Medication 2 PUFF: at 20:00

## 2021-01-01 RX ADMIN — Medication 2 PUFF: at 19:12

## 2021-01-01 RX ADMIN — Medication 2 PUFF: at 18:55

## 2021-01-01 RX ADMIN — MUPIROCIN: 20 OINTMENT TOPICAL at 19:44

## 2021-01-01 RX ADMIN — GLYCOPYRROLATE 0.2 MG: 0.2 INJECTION, SOLUTION INTRAMUSCULAR; INTRAVENOUS at 11:14

## 2021-01-01 RX ADMIN — MEROPENEM 1000 MG: 1 INJECTION, POWDER, FOR SOLUTION INTRAVENOUS at 09:40

## 2021-01-01 RX ADMIN — MEROPENEM 1000 MG: 1 INJECTION, POWDER, FOR SOLUTION INTRAVENOUS at 12:37

## 2021-01-01 RX ADMIN — ALLOPURINOL 100 MG: 100 TABLET ORAL at 10:06

## 2021-01-01 RX ADMIN — MUPIROCIN: 20 OINTMENT TOPICAL at 07:58

## 2021-01-01 RX ADMIN — Medication 2 PUFF: at 08:29

## 2021-01-01 RX ADMIN — SODIUM CHLORIDE, PRESERVATIVE FREE 10 ML: 5 INJECTION INTRAVENOUS at 20:29

## 2021-01-01 RX ADMIN — GUAIFENESIN 600 MG: 600 TABLET, EXTENDED RELEASE ORAL at 09:31

## 2021-01-01 RX ADMIN — Medication: at 11:03

## 2021-01-01 RX ADMIN — GUAIFENESIN 600 MG: 600 TABLET, EXTENDED RELEASE ORAL at 08:50

## 2021-01-01 RX ADMIN — MICONAZOLE NITRATE: 20 POWDER TOPICAL at 21:23

## 2021-01-01 RX ADMIN — Medication 2 PUFF: at 19:26

## 2021-01-01 RX ADMIN — VASOPRESSIN 0.03 UNITS/MIN: 20 INJECTION INTRAVENOUS at 05:58

## 2021-01-01 RX ADMIN — INSULIN LISPRO 4 UNITS: 100 INJECTION, SOLUTION INTRAVENOUS; SUBCUTANEOUS at 11:39

## 2021-01-01 RX ADMIN — APIXABAN 2.5 MG: 5 TABLET, FILM COATED ORAL at 21:57

## 2021-01-01 RX ADMIN — SODIUM CHLORIDE 25 ML: 9 INJECTION, SOLUTION INTRAVENOUS at 09:42

## 2021-01-01 RX ADMIN — APIXABAN 2.5 MG: 5 TABLET, FILM COATED ORAL at 08:59

## 2021-01-01 RX ADMIN — MUPIROCIN: 20 OINTMENT TOPICAL at 20:26

## 2021-01-01 RX ADMIN — Medication 2 PUFF: at 17:28

## 2021-01-01 RX ADMIN — VASOPRESSIN 0.03 UNITS/MIN: 20 INJECTION INTRAVENOUS at 17:36

## 2021-01-01 RX ADMIN — ACETAMINOPHEN 650 MG: 325 TABLET ORAL at 20:37

## 2021-01-01 RX ADMIN — MIDODRINE HYDROCHLORIDE 10 MG: 5 TABLET ORAL at 12:38

## 2021-01-01 RX ADMIN — APIXABAN 2.5 MG: 5 TABLET, FILM COATED ORAL at 09:06

## 2021-01-01 RX ADMIN — POTASSIUM CHLORIDE 20 MEQ: 1500 TABLET, EXTENDED RELEASE ORAL at 21:32

## 2021-01-01 RX ADMIN — INSULIN GLARGINE 25 UNITS: 100 INJECTION, SOLUTION SUBCUTANEOUS at 09:03

## 2021-01-01 RX ADMIN — GUAIFENESIN 600 MG: 600 TABLET, EXTENDED RELEASE ORAL at 20:19

## 2021-01-01 RX ADMIN — Medication 20 MEQ: at 19:01

## 2021-01-01 RX ADMIN — SODIUM PHOSPHATE, MONOBASIC, MONOHYDRATE 6 MMOL: 276; 142 INJECTION, SOLUTION INTRAVENOUS at 07:55

## 2021-01-01 RX ADMIN — Medication 2 PUFF: at 19:04

## 2021-01-01 RX ADMIN — Medication: at 04:33

## 2021-01-01 RX ADMIN — SODIUM CHLORIDE, PRESERVATIVE FREE 10 ML: 5 INJECTION INTRAVENOUS at 21:06

## 2021-01-01 RX ADMIN — SODIUM CHLORIDE, PRESERVATIVE FREE 10 ML: 5 INJECTION INTRAVENOUS at 21:17

## 2021-01-01 RX ADMIN — POTASSIUM CHLORIDE 20 MEQ: 400 INJECTION, SOLUTION INTRAVENOUS at 11:59

## 2021-01-01 RX ADMIN — SODIUM PHOSPHATE, MONOBASIC, MONOHYDRATE 6 MMOL: 276; 142 INJECTION, SOLUTION INTRAVENOUS at 17:12

## 2021-01-01 RX ADMIN — CETIRIZINE HYDROCHLORIDE 10 MG: 10 TABLET ORAL at 13:16

## 2021-01-01 RX ADMIN — Medication: at 04:53

## 2021-01-01 RX ADMIN — STANDARDIZED SENNA CONCENTRATE 17.2 MG: 8.6 TABLET ORAL at 20:37

## 2021-01-01 RX ADMIN — INSULIN LISPRO 3 UNITS: 100 INJECTION, SOLUTION INTRAVENOUS; SUBCUTANEOUS at 09:10

## 2021-01-01 RX ADMIN — FUROSEMIDE 5 MG/HR: 100 INJECTION, SOLUTION INTRAMUSCULAR; INTRAVENOUS at 09:03

## 2021-01-01 RX ADMIN — GUAIFENESIN 600 MG: 600 TABLET, EXTENDED RELEASE ORAL at 20:50

## 2021-01-01 RX ADMIN — MONTELUKAST SODIUM 10 MG: 10 TABLET, COATED ORAL at 08:06

## 2021-01-01 RX ADMIN — VANCOMYCIN HYDROCHLORIDE 1000 MG: 1 INJECTION, POWDER, LYOPHILIZED, FOR SOLUTION INTRAVENOUS at 08:16

## 2021-01-01 RX ADMIN — MUPIROCIN: 20 OINTMENT TOPICAL at 10:02

## 2021-01-01 RX ADMIN — APIXABAN 2.5 MG: 5 TABLET, FILM COATED ORAL at 20:33

## 2021-01-01 RX ADMIN — INSULIN LISPRO 1 UNITS: 100 INJECTION, SOLUTION INTRAVENOUS; SUBCUTANEOUS at 10:27

## 2021-01-01 RX ADMIN — INSULIN LISPRO 22 UNITS: 100 INJECTION, SOLUTION INTRAVENOUS; SUBCUTANEOUS at 12:39

## 2021-01-01 RX ADMIN — MEROPENEM 1000 MG: 1 INJECTION, POWDER, FOR SOLUTION INTRAVENOUS at 01:02

## 2021-01-01 RX ADMIN — ASPIRIN 81 MG: 81 TABLET, COATED ORAL at 20:26

## 2021-01-01 RX ADMIN — FUROSEMIDE 10 MG/HR: 100 INJECTION, SOLUTION INTRAMUSCULAR; INTRAVENOUS at 06:15

## 2021-01-01 RX ADMIN — INSULIN LISPRO 3 UNITS: 100 INJECTION, SOLUTION INTRAVENOUS; SUBCUTANEOUS at 17:17

## 2021-01-01 RX ADMIN — INSULIN GLARGINE 25 UNITS: 100 INJECTION, SOLUTION SUBCUTANEOUS at 20:54

## 2021-01-01 RX ADMIN — ASPIRIN 81 MG: 81 TABLET, CHEWABLE ORAL at 21:57

## 2021-01-01 RX ADMIN — VANCOMYCIN HYDROCHLORIDE 500 MG: 500 INJECTION, POWDER, LYOPHILIZED, FOR SOLUTION INTRAVENOUS at 23:43

## 2021-01-01 RX ADMIN — INSULIN LISPRO 9 UNITS: 100 INJECTION, SOLUTION INTRAVENOUS; SUBCUTANEOUS at 17:17

## 2021-01-01 RX ADMIN — SODIUM CHLORIDE, PRESERVATIVE FREE 10 ML: 5 INJECTION INTRAVENOUS at 22:37

## 2021-01-01 RX ADMIN — Medication 2 PUFF: at 18:51

## 2021-01-01 RX ADMIN — MUPIROCIN: 20 OINTMENT TOPICAL at 08:03

## 2021-01-01 RX ADMIN — ACETAMINOPHEN 650 MG: 325 TABLET ORAL at 02:32

## 2021-01-01 RX ADMIN — MEROPENEM 1000 MG: 1 INJECTION, POWDER, FOR SOLUTION INTRAVENOUS at 00:20

## 2021-01-01 RX ADMIN — SODIUM BICARBONATE: 84 INJECTION, SOLUTION INTRAVENOUS at 23:48

## 2021-01-01 RX ADMIN — CALCIUM GLUCONATE 1000 MG: 98 INJECTION, SOLUTION INTRAVENOUS at 07:27

## 2021-01-01 RX ADMIN — HYDROCORTISONE SODIUM SUCCINATE 50 MG: 100 INJECTION, POWDER, FOR SOLUTION INTRAMUSCULAR; INTRAVENOUS at 22:41

## 2021-01-01 RX ADMIN — NITROGLYCERIN 0.4 MG: 0.4 TABLET SUBLINGUAL at 20:10

## 2021-01-01 RX ADMIN — MAGNESIUM SULFATE HEPTAHYDRATE 1000 MG: 1 INJECTION, SOLUTION INTRAVENOUS at 06:17

## 2021-01-01 RX ADMIN — MIDAZOLAM 2 MG: 1 INJECTION INTRAMUSCULAR; INTRAVENOUS at 11:01

## 2021-01-01 RX ADMIN — ALBUTEROL SULFATE 2.5 MG: 2.5 SOLUTION RESPIRATORY (INHALATION) at 15:41

## 2021-01-01 RX ADMIN — GUAIFENESIN 600 MG: 600 TABLET, EXTENDED RELEASE ORAL at 08:51

## 2021-01-01 RX ADMIN — CETIRIZINE HYDROCHLORIDE 10 MG: 10 TABLET ORAL at 12:03

## 2021-01-01 RX ADMIN — NOREPINEPHRINE BITARTRATE 11 MCG/MIN: 1 INJECTION INTRAVENOUS at 11:52

## 2021-01-01 RX ADMIN — Medication: at 08:09

## 2021-01-01 RX ADMIN — VASOPRESSIN 0.03 UNITS/MIN: 20 INJECTION INTRAVENOUS at 06:17

## 2021-01-01 RX ADMIN — ASPIRIN 81 MG: 81 TABLET, COATED ORAL at 19:50

## 2021-01-01 RX ADMIN — DOCUSATE SODIUM 100 MG: 100 CAPSULE, LIQUID FILLED ORAL at 09:31

## 2021-01-01 RX ADMIN — ALBUTEROL SULFATE 2.5 MG: 2.5 SOLUTION RESPIRATORY (INHALATION) at 06:56

## 2021-01-01 RX ADMIN — APIXABAN 5 MG: 5 TABLET, FILM COATED ORAL at 08:53

## 2021-01-01 RX ADMIN — FUROSEMIDE 100 MG: 10 INJECTION, SOLUTION INTRAVENOUS at 11:04

## 2021-01-01 RX ADMIN — STANDARDIZED SENNA CONCENTRATE 17.2 MG: 8.6 TABLET ORAL at 21:02

## 2021-01-01 RX ADMIN — POTASSIUM CHLORIDE 20 MEQ: 29.8 INJECTION, SOLUTION INTRAVENOUS at 22:06

## 2021-01-01 RX ADMIN — Medication 10 ML: at 21:20

## 2021-01-01 RX ADMIN — SODIUM CHLORIDE 250 ML: 9 INJECTION, SOLUTION INTRAVENOUS at 10:07

## 2021-01-01 RX ADMIN — CALCIUM GLUCONATE 1000 MG: 98 INJECTION, SOLUTION INTRAVENOUS at 14:51

## 2021-01-01 RX ADMIN — Medication: at 10:04

## 2021-01-01 RX ADMIN — CALCIUM GLUCONATE 1000 MG: 98 INJECTION, SOLUTION INTRAVENOUS at 00:29

## 2021-01-01 RX ADMIN — SODIUM CHLORIDE 25 ML: 9 INJECTION, SOLUTION INTRAVENOUS at 06:40

## 2021-01-01 RX ADMIN — MICONAZOLE NITRATE: 20 POWDER TOPICAL at 21:33

## 2021-01-01 RX ADMIN — APIXABAN 2.5 MG: 5 TABLET, FILM COATED ORAL at 08:50

## 2021-01-01 RX ADMIN — INSULIN GLARGINE 15 UNITS: 100 INJECTION, SOLUTION SUBCUTANEOUS at 21:24

## 2021-01-01 RX ADMIN — POTASSIUM CHLORIDE 10 MEQ: 7.46 INJECTION, SOLUTION INTRAVENOUS at 09:53

## 2021-01-01 RX ADMIN — INSULIN GLARGINE 15 UNITS: 100 INJECTION, SOLUTION SUBCUTANEOUS at 08:27

## 2021-01-01 RX ADMIN — METOPROLOL SUCCINATE 50 MG: 50 TABLET, EXTENDED RELEASE ORAL at 10:23

## 2021-01-01 RX ADMIN — PANTOPRAZOLE SODIUM 40 MG: 40 TABLET, DELAYED RELEASE ORAL at 05:55

## 2021-01-01 RX ADMIN — INSULIN LISPRO 11 UNITS: 100 INJECTION, SOLUTION INTRAVENOUS; SUBCUTANEOUS at 12:17

## 2021-01-01 RX ADMIN — PANTOPRAZOLE SODIUM 40 MG: 40 TABLET, DELAYED RELEASE ORAL at 06:01

## 2021-01-01 RX ADMIN — INSULIN GLARGINE 40 UNITS: 100 INJECTION, SOLUTION SUBCUTANEOUS at 08:59

## 2021-01-01 RX ADMIN — MICONAZOLE NITRATE: 20 POWDER TOPICAL at 20:11

## 2021-01-01 RX ADMIN — SODIUM CHLORIDE, PRESERVATIVE FREE 10 ML: 5 INJECTION INTRAVENOUS at 08:55

## 2021-01-01 RX ADMIN — PANTOPRAZOLE SODIUM 40 MG: 40 TABLET, DELAYED RELEASE ORAL at 05:07

## 2021-01-01 RX ADMIN — MONTELUKAST SODIUM 10 MG: 10 TABLET, COATED ORAL at 09:07

## 2021-01-01 RX ADMIN — STANDARDIZED SENNA CONCENTRATE 17.2 MG: 8.6 TABLET ORAL at 21:56

## 2021-01-01 RX ADMIN — PHENYLEPHRINE HYDROCHLORIDE 200 MCG: 10 INJECTION INTRAVENOUS at 11:51

## 2021-01-01 RX ADMIN — METOPROLOL SUCCINATE 50 MG: 50 TABLET, EXTENDED RELEASE ORAL at 08:41

## 2021-01-01 RX ADMIN — DOCUSATE SODIUM 100 MG: 100 CAPSULE, LIQUID FILLED ORAL at 09:17

## 2021-01-01 RX ADMIN — ACETAMINOPHEN 650 MG: 325 TABLET ORAL at 20:39

## 2021-01-01 RX ADMIN — MICONAZOLE NITRATE: 20 POWDER TOPICAL at 19:48

## 2021-01-01 RX ADMIN — VASOPRESSIN 0.03 UNITS/MIN: 20 INJECTION INTRAVENOUS at 06:47

## 2021-01-01 RX ADMIN — INSULIN GLARGINE 25 UNITS: 100 INJECTION, SOLUTION SUBCUTANEOUS at 20:45

## 2021-01-01 RX ADMIN — MAGNESIUM SULFATE HEPTAHYDRATE 2000 MG: 40 INJECTION, SOLUTION INTRAVENOUS at 09:17

## 2021-01-01 RX ADMIN — CALCIUM GLUCONATE 2000 MG: 98 INJECTION, SOLUTION INTRAVENOUS at 08:24

## 2021-01-01 RX ADMIN — CETIRIZINE HYDROCHLORIDE 10 MG: 10 TABLET ORAL at 09:05

## 2021-01-01 RX ADMIN — FUROSEMIDE 40 MG: 10 INJECTION, SOLUTION INTRAMUSCULAR; INTRAVENOUS at 20:36

## 2021-01-01 RX ADMIN — APIXABAN 2.5 MG: 5 TABLET, FILM COATED ORAL at 21:39

## 2021-01-01 RX ADMIN — ASPIRIN 81 MG: 81 TABLET, CHEWABLE ORAL at 21:12

## 2021-01-01 RX ADMIN — DOCUSATE SODIUM 100 MG: 100 CAPSULE, LIQUID FILLED ORAL at 12:03

## 2021-01-01 RX ADMIN — PANTOPRAZOLE SODIUM 40 MG: 40 TABLET, DELAYED RELEASE ORAL at 05:34

## 2021-01-01 RX ADMIN — METOPROLOL SUCCINATE 50 MG: 50 TABLET, EXTENDED RELEASE ORAL at 08:50

## 2021-01-01 RX ADMIN — SODIUM CHLORIDE 25 ML: 9 INJECTION, SOLUTION INTRAVENOUS at 06:44

## 2021-01-01 RX ADMIN — INSULIN LISPRO 5 UNITS: 100 INJECTION, SOLUTION INTRAVENOUS; SUBCUTANEOUS at 09:08

## 2021-01-01 RX ADMIN — INSULIN GLARGINE 10 UNITS: 100 INJECTION, SOLUTION SUBCUTANEOUS at 09:20

## 2021-01-01 RX ADMIN — Medication: at 01:17

## 2021-01-01 RX ADMIN — POTASSIUM CHLORIDE 20 MEQ: 1500 TABLET, EXTENDED RELEASE ORAL at 08:53

## 2021-01-01 RX ADMIN — METOPROLOL SUCCINATE 50 MG: 50 TABLET, EXTENDED RELEASE ORAL at 20:34

## 2021-01-01 RX ADMIN — FUROSEMIDE 20 MG/HR: 100 INJECTION, SOLUTION INTRAMUSCULAR; INTRAVENOUS at 02:29

## 2021-01-01 RX ADMIN — Medication: at 08:00

## 2021-01-01 RX ADMIN — ACETAMINOPHEN 650 MG: 325 TABLET ORAL at 14:02

## 2021-01-01 RX ADMIN — MAGNESIUM GLUCONATE 500 MG ORAL TABLET 400 MG: 500 TABLET ORAL at 13:16

## 2021-01-01 RX ADMIN — INSULIN LISPRO 3 UNITS: 100 INJECTION, SOLUTION INTRAVENOUS; SUBCUTANEOUS at 16:50

## 2021-01-01 RX ADMIN — Medication 2 PUFF: at 06:56

## 2021-01-01 RX ADMIN — INSULIN GLARGINE 30 UNITS: 100 INJECTION, SOLUTION SUBCUTANEOUS at 10:32

## 2021-01-01 RX ADMIN — ASPIRIN 81 MG: 81 TABLET, COATED ORAL at 21:03

## 2021-01-01 RX ADMIN — OXYCODONE HYDROCHLORIDE 5 MG: 5 TABLET ORAL at 17:56

## 2021-01-01 RX ADMIN — MIDODRINE HYDROCHLORIDE 10 MG: 5 TABLET ORAL at 07:58

## 2021-01-01 RX ADMIN — POTASSIUM CHLORIDE 40 MEQ: 1500 TABLET, EXTENDED RELEASE ORAL at 12:13

## 2021-01-01 RX ADMIN — ALBUTEROL SULFATE 2.5 MG: 2.5 SOLUTION RESPIRATORY (INHALATION) at 13:40

## 2021-01-01 RX ADMIN — INSULIN LISPRO 1 UNITS: 100 INJECTION, SOLUTION INTRAVENOUS; SUBCUTANEOUS at 13:39

## 2021-01-01 RX ADMIN — MAGNESIUM GLUCONATE 500 MG ORAL TABLET 400 MG: 500 TABLET ORAL at 20:09

## 2021-01-01 RX ADMIN — INSULIN LISPRO 6 UNITS: 100 INJECTION, SOLUTION INTRAVENOUS; SUBCUTANEOUS at 11:34

## 2021-01-01 RX ADMIN — MEROPENEM 1000 MG: 1 INJECTION, POWDER, FOR SOLUTION INTRAVENOUS at 08:15

## 2021-01-01 RX ADMIN — DOCUSATE SODIUM 100 MG: 100 CAPSULE, LIQUID FILLED ORAL at 10:29

## 2021-01-01 RX ADMIN — FENTANYL CITRATE 100 MCG: 50 INJECTION INTRAMUSCULAR; INTRAVENOUS at 11:05

## 2021-01-01 RX ADMIN — HEPARIN SODIUM 1190 UNITS/HR: 10000 INJECTION, SOLUTION INTRAVENOUS at 21:06

## 2021-01-01 RX ADMIN — METOPROLOL SUCCINATE 50 MG: 50 TABLET, EXTENDED RELEASE ORAL at 09:17

## 2021-01-01 RX ADMIN — CALCIUM GLUCONATE 1000 MG: 98 INJECTION, SOLUTION INTRAVENOUS at 07:42

## 2021-01-01 RX ADMIN — Medication: at 07:40

## 2021-01-01 RX ADMIN — MONTELUKAST SODIUM 10 MG: 10 TABLET, COATED ORAL at 08:41

## 2021-01-01 RX ADMIN — Medication 10 ML: at 20:33

## 2021-01-01 RX ADMIN — ALBUMIN (HUMAN) 25 G: 0.25 INJECTION, SOLUTION INTRAVENOUS at 08:05

## 2021-01-01 RX ADMIN — APIXABAN 2.5 MG: 5 TABLET, FILM COATED ORAL at 09:05

## 2021-01-01 RX ADMIN — MEROPENEM 1000 MG: 1 INJECTION, POWDER, FOR SOLUTION INTRAVENOUS at 17:20

## 2021-01-01 RX ADMIN — INSULIN LISPRO 6 UNITS: 100 INJECTION, SOLUTION INTRAVENOUS; SUBCUTANEOUS at 00:07

## 2021-01-01 RX ADMIN — MEROPENEM 1000 MG: 1 INJECTION, POWDER, FOR SOLUTION INTRAVENOUS at 16:28

## 2021-01-01 RX ADMIN — Medication: at 04:41

## 2021-01-01 RX ADMIN — INSULIN LISPRO 20 UNITS: 100 INJECTION, SOLUTION INTRAVENOUS; SUBCUTANEOUS at 12:10

## 2021-01-01 RX ADMIN — HEPARIN SODIUM 5000 UNITS: 5000 INJECTION INTRAVENOUS; SUBCUTANEOUS at 09:38

## 2021-01-01 RX ADMIN — MEROPENEM 500 MG: 500 INJECTION, POWDER, FOR SOLUTION INTRAVENOUS at 15:21

## 2021-01-01 RX ADMIN — ASPIRIN 81 MG: 81 TABLET, CHEWABLE ORAL at 19:54

## 2021-01-01 RX ADMIN — SODIUM CHLORIDE, POTASSIUM CHLORIDE, SODIUM LACTATE AND CALCIUM CHLORIDE: 600; 310; 30; 20 INJECTION, SOLUTION INTRAVENOUS at 09:18

## 2021-01-01 RX ADMIN — MAGNESIUM GLUCONATE 500 MG ORAL TABLET 400 MG: 500 TABLET ORAL at 08:40

## 2021-01-01 RX ADMIN — Medication 2 PUFF: at 08:32

## 2021-01-01 RX ADMIN — PANTOPRAZOLE SODIUM 40 MG: 40 TABLET, DELAYED RELEASE ORAL at 10:03

## 2021-01-01 RX ADMIN — NOREPINEPHRINE BITARTRATE 30 MCG/MIN: 1 INJECTION INTRAVENOUS at 14:38

## 2021-01-01 RX ADMIN — FUROSEMIDE 20 MG/HR: 100 INJECTION, SOLUTION INTRAMUSCULAR; INTRAVENOUS at 19:07

## 2021-01-01 RX ADMIN — FUROSEMIDE 40 MG: 10 INJECTION, SOLUTION INTRAMUSCULAR; INTRAVENOUS at 16:41

## 2021-01-01 RX ADMIN — Medication 2 PUFF: at 07:55

## 2021-01-01 RX ADMIN — INSULIN LISPRO 3 UNITS: 100 INJECTION, SOLUTION INTRAVENOUS; SUBCUTANEOUS at 13:05

## 2021-01-01 RX ADMIN — CALCIUM GLUCONATE 1000 MG: 98 INJECTION, SOLUTION INTRAVENOUS at 08:14

## 2021-01-01 RX ADMIN — SODIUM CHLORIDE 250 ML: 9 INJECTION, SOLUTION INTRAVENOUS at 14:40

## 2021-01-01 RX ADMIN — SODIUM CHLORIDE, PRESERVATIVE FREE 10 ML: 5 INJECTION INTRAVENOUS at 10:07

## 2021-01-01 RX ADMIN — APIXABAN 2.5 MG: 5 TABLET, FILM COATED ORAL at 20:50

## 2021-01-01 RX ADMIN — POTASSIUM PHOSPHATE, MONOBASIC AND POTASSIUM PHOSPHATE, DIBASIC 30 MMOL: 224; 236 INJECTION, SOLUTION, CONCENTRATE INTRAVENOUS at 16:57

## 2021-01-01 RX ADMIN — SODIUM CHLORIDE 1000 ML: 9 INJECTION, SOLUTION INTRAVENOUS at 13:26

## 2021-01-01 RX ADMIN — MIDAZOLAM HYDROCHLORIDE 1 MG: 5 INJECTION INTRAMUSCULAR; INTRAVENOUS at 13:18

## 2021-01-01 RX ADMIN — METOLAZONE 10 MG: 2.5 TABLET ORAL at 11:07

## 2021-01-01 RX ADMIN — MICONAZOLE NITRATE: 20 POWDER TOPICAL at 22:17

## 2021-01-01 RX ADMIN — METOPROLOL SUCCINATE 50 MG: 50 TABLET, EXTENDED RELEASE ORAL at 09:07

## 2021-01-01 RX ADMIN — Medication 2 PUFF: at 21:14

## 2021-01-01 RX ADMIN — SODIUM CHLORIDE: 9 INJECTION, SOLUTION INTRAVENOUS at 21:57

## 2021-01-01 RX ADMIN — Medication 10 ML: at 20:41

## 2021-01-01 RX ADMIN — TRAZODONE HYDROCHLORIDE 50 MG: 50 TABLET ORAL at 21:55

## 2021-01-01 RX ADMIN — ASPIRIN 81 MG: 81 TABLET, CHEWABLE ORAL at 21:32

## 2021-01-01 RX ADMIN — ALBUMIN (HUMAN) 25 G: 0.25 INJECTION, SOLUTION INTRAVENOUS at 17:28

## 2021-01-01 RX ADMIN — INSULIN LISPRO 6 UNITS: 100 INJECTION, SOLUTION INTRAVENOUS; SUBCUTANEOUS at 23:58

## 2021-01-01 RX ADMIN — MEROPENEM 1000 MG: 1 INJECTION, POWDER, FOR SOLUTION INTRAVENOUS at 08:27

## 2021-01-01 RX ADMIN — EPINEPHRINE 1 MG: 1 INJECTION, SOLUTION, CONCENTRATE INTRAVENOUS at 13:27

## 2021-01-01 RX ADMIN — INSULIN LISPRO 1 UNITS: 100 INJECTION, SOLUTION INTRAVENOUS; SUBCUTANEOUS at 09:50

## 2021-01-01 RX ADMIN — MEROPENEM 1000 MG: 1 INJECTION, POWDER, FOR SOLUTION INTRAVENOUS at 00:47

## 2021-01-01 RX ADMIN — MEROPENEM 1000 MG: 1 INJECTION, POWDER, FOR SOLUTION INTRAVENOUS at 00:51

## 2021-01-01 RX ADMIN — MEROPENEM 1000 MG: 1 INJECTION, POWDER, FOR SOLUTION INTRAVENOUS at 17:33

## 2021-01-01 RX ADMIN — MAGNESIUM GLUCONATE 500 MG ORAL TABLET 400 MG: 500 TABLET ORAL at 08:06

## 2021-01-01 RX ADMIN — ALBUMIN (HUMAN) 25 G: 0.25 INJECTION, SOLUTION INTRAVENOUS at 21:51

## 2021-01-01 RX ADMIN — INSULIN LISPRO 3 UNITS: 100 INJECTION, SOLUTION INTRAVENOUS; SUBCUTANEOUS at 05:19

## 2021-01-01 RX ADMIN — HYDROCORTISONE SODIUM SUCCINATE 100 MG: 100 INJECTION, POWDER, FOR SOLUTION INTRAMUSCULAR; INTRAVENOUS at 11:18

## 2021-01-01 RX ADMIN — ALBUTEROL SULFATE 2.5 MG: 2.5 SOLUTION RESPIRATORY (INHALATION) at 07:01

## 2021-01-01 RX ADMIN — SODIUM CHLORIDE: 9 INJECTION, SOLUTION INTRAVENOUS at 16:38

## 2021-01-01 RX ADMIN — SODIUM BICARBONATE: 84 INJECTION, SOLUTION INTRAVENOUS at 05:27

## 2021-01-01 RX ADMIN — MEROPENEM 1000 MG: 1 INJECTION, POWDER, FOR SOLUTION INTRAVENOUS at 08:08

## 2021-01-01 RX ADMIN — MEROPENEM 1000 MG: 1 INJECTION, POWDER, FOR SOLUTION INTRAVENOUS at 04:11

## 2021-01-01 RX ADMIN — INSULIN LISPRO 1 UNITS: 100 INJECTION, SOLUTION INTRAVENOUS; SUBCUTANEOUS at 17:22

## 2021-01-01 RX ADMIN — BENZONATATE 100 MG: 100 CAPSULE ORAL at 19:45

## 2021-01-01 RX ADMIN — Medication 2 PUFF: at 07:10

## 2021-01-01 RX ADMIN — SODIUM CHLORIDE 250 ML: 9 INJECTION, SOLUTION INTRAVENOUS at 10:28

## 2021-01-01 RX ADMIN — Medication: at 04:13

## 2021-01-01 RX ADMIN — SODIUM PHOSPHATE, MONOBASIC, MONOHYDRATE 12 MMOL: 276; 142 INJECTION, SOLUTION INTRAVENOUS at 09:07

## 2021-01-01 RX ADMIN — ASPIRIN 81 MG: 81 TABLET, CHEWABLE ORAL at 20:57

## 2021-01-01 RX ADMIN — PHENYLEPHRINE HYDROCHLORIDE 200 MCG: 10 INJECTION INTRAVENOUS at 12:03

## 2021-01-01 RX ADMIN — LIDOCAINE HYDROCHLORIDE 100 MG: 20 INJECTION, SOLUTION EPIDURAL; INFILTRATION; INTRACAUDAL; PERINEURAL at 11:05

## 2021-01-01 RX ADMIN — MEROPENEM 1000 MG: 1 INJECTION, POWDER, FOR SOLUTION INTRAVENOUS at 21:58

## 2021-01-01 RX ADMIN — APIXABAN 2.5 MG: 5 TABLET, FILM COATED ORAL at 20:37

## 2021-01-01 RX ADMIN — SODIUM PHOSPHATE, MONOBASIC, MONOHYDRATE 6 MMOL: 276; 142 INJECTION, SOLUTION INTRAVENOUS at 23:02

## 2021-01-01 RX ADMIN — VANCOMYCIN HYDROCHLORIDE 1000 MG: 1 INJECTION, POWDER, LYOPHILIZED, FOR SOLUTION INTRAVENOUS at 07:44

## 2021-01-01 RX ADMIN — TRAZODONE HYDROCHLORIDE 50 MG: 50 TABLET ORAL at 20:52

## 2021-01-01 RX ADMIN — TRAZODONE HYDROCHLORIDE 50 MG: 50 TABLET ORAL at 21:02

## 2021-01-01 RX ADMIN — SODIUM CHLORIDE, PRESERVATIVE FREE 10 ML: 5 INJECTION INTRAVENOUS at 20:51

## 2021-01-01 RX ADMIN — MONTELUKAST SODIUM 10 MG: 10 TABLET, COATED ORAL at 20:57

## 2021-01-01 RX ADMIN — FUROSEMIDE 80 MG: 10 INJECTION, SOLUTION INTRAMUSCULAR; INTRAVENOUS at 18:28

## 2021-01-01 RX ADMIN — CALCIUM GLUCONATE 1000 MG: 98 INJECTION, SOLUTION INTRAVENOUS at 08:58

## 2021-01-01 RX ADMIN — MAGNESIUM GLUCONATE 500 MG ORAL TABLET 400 MG: 500 TABLET ORAL at 09:30

## 2021-01-01 RX ADMIN — Medication: at 01:13

## 2021-01-01 RX ADMIN — PANTOPRAZOLE SODIUM 40 MG: 40 TABLET, DELAYED RELEASE ORAL at 08:53

## 2021-01-01 RX ADMIN — Medication 20 MEQ: at 17:58

## 2021-01-01 RX ADMIN — CETIRIZINE HYDROCHLORIDE 10 MG: 10 TABLET ORAL at 20:34

## 2021-01-01 RX ADMIN — PHENYLEPHRINE HYDROCHLORIDE 200 MCG: 10 INJECTION INTRAVENOUS at 11:41

## 2021-01-01 RX ADMIN — APIXABAN 5 MG: 5 TABLET, FILM COATED ORAL at 21:58

## 2021-01-01 RX ADMIN — MAGNESIUM GLUCONATE 500 MG ORAL TABLET 400 MG: 500 TABLET ORAL at 20:19

## 2021-01-01 RX ADMIN — MEROPENEM 1000 MG: 1 INJECTION, POWDER, FOR SOLUTION INTRAVENOUS at 17:04

## 2021-01-01 RX ADMIN — FERROUS SULFATE TAB 325 MG (65 MG ELEMENTAL FE) 325 MG: 325 (65 FE) TAB at 09:06

## 2021-01-01 RX ADMIN — HEPARIN SODIUM 1190 UNITS/HR: 10000 INJECTION, SOLUTION INTRAVENOUS at 04:29

## 2021-01-01 RX ADMIN — MEROPENEM 1000 MG: 1 INJECTION, POWDER, FOR SOLUTION INTRAVENOUS at 04:25

## 2021-01-01 RX ADMIN — HEPARIN SODIUM 3600 UNITS: 1000 INJECTION INTRAVENOUS; SUBCUTANEOUS at 18:08

## 2021-01-01 RX ADMIN — APIXABAN 2.5 MG: 5 TABLET, FILM COATED ORAL at 21:18

## 2021-01-01 RX ADMIN — SODIUM CHLORIDE, PRESERVATIVE FREE 10 ML: 5 INJECTION INTRAVENOUS at 19:48

## 2021-01-01 RX ADMIN — TRAZODONE HYDROCHLORIDE 50 MG: 50 TABLET ORAL at 21:29

## 2021-01-01 RX ADMIN — STANDARDIZED SENNA CONCENTRATE 17.2 MG: 8.6 TABLET ORAL at 20:19

## 2021-01-01 RX ADMIN — Medication 10 ML: at 09:00

## 2021-01-01 RX ADMIN — INSULIN GLARGINE 30 UNITS: 100 INJECTION, SOLUTION SUBCUTANEOUS at 21:36

## 2021-01-01 RX ADMIN — INSULIN LISPRO 2 UNITS: 100 INJECTION, SOLUTION INTRAVENOUS; SUBCUTANEOUS at 10:30

## 2021-01-01 RX ADMIN — ACETAMINOPHEN 650 MG: 325 TABLET ORAL at 05:07

## 2021-01-01 RX ADMIN — INSULIN GLARGINE 30 UNITS: 100 INJECTION, SOLUTION SUBCUTANEOUS at 22:04

## 2021-01-01 RX ADMIN — Medication: at 08:12

## 2021-01-01 RX ADMIN — Medication 10 ML: at 19:45

## 2021-01-01 RX ADMIN — Medication: at 01:44

## 2021-01-01 RX ADMIN — PHENYLEPHRINE HYDROCHLORIDE 200 MCG: 10 INJECTION INTRAVENOUS at 11:35

## 2021-01-01 RX ADMIN — HEPARIN SODIUM 3000 UNITS: 1000 INJECTION INTRAVENOUS; SUBCUTANEOUS at 08:51

## 2021-01-01 RX ADMIN — HYDROCORTISONE SODIUM SUCCINATE 50 MG: 100 INJECTION, POWDER, FOR SOLUTION INTRAMUSCULAR; INTRAVENOUS at 21:09

## 2021-01-01 RX ADMIN — PANTOPRAZOLE SODIUM 40 MG: 40 TABLET, DELAYED RELEASE ORAL at 04:21

## 2021-01-01 RX ADMIN — Medication 2 PUFF: at 19:34

## 2021-01-01 RX ADMIN — CALCIUM GLUCONATE 1000 MG: 98 INJECTION, SOLUTION INTRAVENOUS at 01:19

## 2021-01-01 RX ADMIN — ALBUTEROL SULFATE 2.5 MG: 2.5 SOLUTION RESPIRATORY (INHALATION) at 19:29

## 2021-01-01 RX ADMIN — HEPARIN SODIUM 10 ML/HR: 10000 INJECTION, SOLUTION INTRAVENOUS at 11:55

## 2021-01-01 RX ADMIN — ALBUTEROL SULFATE 2.5 MG: 2.5 SOLUTION RESPIRATORY (INHALATION) at 18:57

## 2021-01-01 RX ADMIN — STANDARDIZED SENNA CONCENTRATE 17.2 MG: 8.6 TABLET ORAL at 19:55

## 2021-01-01 RX ADMIN — ROCURONIUM BROMIDE 30 MG: 10 INJECTION, SOLUTION INTRAVENOUS at 11:25

## 2021-01-01 RX ADMIN — POTASSIUM CHLORIDE 20 MEQ: 1500 TABLET, EXTENDED RELEASE ORAL at 15:04

## 2021-01-01 RX ADMIN — DOCUSATE SODIUM 100 MG: 100 CAPSULE, LIQUID FILLED ORAL at 22:05

## 2021-01-01 RX ADMIN — Medication 2 PUFF: at 06:41

## 2021-01-01 RX ADMIN — INSULIN LISPRO 6 UNITS: 100 INJECTION, SOLUTION INTRAVENOUS; SUBCUTANEOUS at 12:10

## 2021-01-01 RX ADMIN — MICONAZOLE NITRATE: 20 POWDER TOPICAL at 10:34

## 2021-01-01 RX ADMIN — FERROUS SULFATE TAB 325 MG (65 MG ELEMENTAL FE) 325 MG: 325 (65 FE) TAB at 08:41

## 2021-01-01 RX ADMIN — FUROSEMIDE 80 MG: 10 INJECTION, SOLUTION INTRAMUSCULAR; INTRAVENOUS at 11:53

## 2021-01-01 RX ADMIN — PANTOPRAZOLE SODIUM 40 MG: 40 INJECTION, POWDER, FOR SOLUTION INTRAVENOUS at 07:59

## 2021-01-01 RX ADMIN — INSULIN LISPRO 20 UNITS: 100 INJECTION, SOLUTION INTRAVENOUS; SUBCUTANEOUS at 16:45

## 2021-01-01 RX ADMIN — SODIUM CHLORIDE 1000 ML: 9 INJECTION, SOLUTION INTRAVENOUS at 22:52

## 2021-01-01 RX ADMIN — ROCURONIUM BROMIDE 50 MG: 10 INJECTION, SOLUTION INTRAVENOUS at 11:05

## 2021-01-01 RX ADMIN — CALCIUM GLUCONATE 1000 MG: 98 INJECTION, SOLUTION INTRAVENOUS at 23:09

## 2021-01-01 RX ADMIN — VASOPRESSIN 0.03 UNITS/MIN: 20 INJECTION INTRAVENOUS at 06:54

## 2021-01-01 RX ADMIN — STANDARDIZED SENNA CONCENTRATE 17.2 MG: 8.6 TABLET ORAL at 21:00

## 2021-01-01 RX ADMIN — MAGNESIUM GLUCONATE 500 MG ORAL TABLET 400 MG: 500 TABLET ORAL at 21:56

## 2021-01-01 RX ADMIN — INSULIN LISPRO 3 UNITS: 100 INJECTION, SOLUTION INTRAVENOUS; SUBCUTANEOUS at 07:41

## 2021-01-01 RX ADMIN — Medication 2 PUFF: at 07:01

## 2021-01-01 RX ADMIN — HEPARIN SODIUM 1000 UNITS: 1000 INJECTION INTRAVENOUS; SUBCUTANEOUS at 18:38

## 2021-01-01 RX ADMIN — Medication 2 PUFF: at 11:44

## 2021-01-01 RX ADMIN — Medication 2 PUFF: at 08:07

## 2021-01-01 RX ADMIN — Medication 2 PUFF: at 08:27

## 2021-01-01 RX ADMIN — Medication 2 PUFF: at 19:32

## 2021-01-01 RX ADMIN — INSULIN LISPRO 1 UNITS: 100 INJECTION, SOLUTION INTRAVENOUS; SUBCUTANEOUS at 12:13

## 2021-01-01 RX ADMIN — Medication 10 ML: at 07:59

## 2021-01-01 RX ADMIN — MONTELUKAST SODIUM 10 MG: 10 TABLET, COATED ORAL at 08:50

## 2021-01-01 RX ADMIN — PROPOFOL 250 MG: 10 INJECTION, EMULSION INTRAVENOUS at 11:25

## 2021-01-01 RX ADMIN — Medication 1500 MG: at 11:36

## 2021-01-01 RX ADMIN — APIXABAN 2.5 MG: 5 TABLET, FILM COATED ORAL at 19:55

## 2021-01-01 RX ADMIN — SODIUM BICARBONATE: 84 INJECTION, SOLUTION INTRAVENOUS at 04:38

## 2021-01-01 RX ADMIN — MAGNESIUM GLUCONATE 500 MG ORAL TABLET 400 MG: 500 TABLET ORAL at 09:06

## 2021-01-01 RX ADMIN — SODIUM CHLORIDE 25 ML: 9 INJECTION, SOLUTION INTRAVENOUS at 15:12

## 2021-01-01 RX ADMIN — MONTELUKAST SODIUM 10 MG: 10 TABLET, COATED ORAL at 21:58

## 2021-01-01 RX ADMIN — INSULIN GLARGINE 15 UNITS: 100 INJECTION, SOLUTION SUBCUTANEOUS at 22:00

## 2021-01-01 RX ADMIN — VANCOMYCIN HYDROCHLORIDE 2000 MG: 1 INJECTION, POWDER, LYOPHILIZED, FOR SOLUTION INTRAVENOUS at 04:15

## 2021-01-01 RX ADMIN — ALBUTEROL SULFATE 2.5 MG: 2.5 SOLUTION RESPIRATORY (INHALATION) at 19:30

## 2021-01-01 RX ADMIN — CALCIUM GLUCONATE 1000 MG: 98 INJECTION, SOLUTION INTRAVENOUS at 23:34

## 2021-01-01 RX ADMIN — Medication 10 ML: at 08:54

## 2021-01-01 RX ADMIN — ALLOPURINOL 100 MG: 100 TABLET ORAL at 10:29

## 2021-01-01 RX ADMIN — SODIUM CHLORIDE: 9 INJECTION, SOLUTION INTRAVENOUS at 08:58

## 2021-01-01 RX ADMIN — MIDODRINE HYDROCHLORIDE 10 MG: 5 TABLET ORAL at 08:54

## 2021-01-01 RX ADMIN — GUAIFENESIN 600 MG: 600 TABLET, EXTENDED RELEASE ORAL at 10:24

## 2021-01-01 RX ADMIN — POTASSIUM CHLORIDE 20 MEQ: 1500 TABLET, EXTENDED RELEASE ORAL at 21:56

## 2021-01-01 RX ADMIN — CALCIUM GLUCONATE 1000 MG: 98 INJECTION, SOLUTION INTRAVENOUS at 15:45

## 2021-01-01 RX ADMIN — MIDODRINE HYDROCHLORIDE 10 MG: 5 TABLET ORAL at 12:23

## 2021-01-01 RX ADMIN — TORSEMIDE 60 MG: 20 TABLET ORAL at 12:05

## 2021-01-01 RX ADMIN — INSULIN GLARGINE 25 UNITS: 100 INJECTION, SOLUTION SUBCUTANEOUS at 07:59

## 2021-01-01 RX ADMIN — DOCUSATE SODIUM 100 MG: 100 CAPSULE, LIQUID FILLED ORAL at 08:40

## 2021-01-01 RX ADMIN — APIXABAN 2.5 MG: 5 TABLET, FILM COATED ORAL at 21:52

## 2021-01-01 RX ADMIN — Medication 10 ML: at 19:59

## 2021-01-01 RX ADMIN — INSULIN LISPRO 12 UNITS: 100 INJECTION, SOLUTION INTRAVENOUS; SUBCUTANEOUS at 18:15

## 2021-01-01 RX ADMIN — METOPROLOL SUCCINATE 50 MG: 50 TABLET, EXTENDED RELEASE ORAL at 08:53

## 2021-01-01 RX ADMIN — LIDOCAINE HYDROCHLORIDE 60 MG: 20 INJECTION, SOLUTION INFILTRATION; PERINEURAL at 11:25

## 2021-01-01 RX ADMIN — SODIUM CHLORIDE 1000 ML: 9 INJECTION, SOLUTION INTRAVENOUS at 00:51

## 2021-01-01 RX ADMIN — MEROPENEM 500 MG: 500 INJECTION, POWDER, FOR SOLUTION INTRAVENOUS at 15:12

## 2021-01-01 RX ADMIN — VASOPRESSIN 0.03 UNITS/MIN: 20 INJECTION INTRAVENOUS at 06:27

## 2021-01-01 RX ADMIN — GUAIFENESIN 600 MG: 600 TABLET, EXTENDED RELEASE ORAL at 11:07

## 2021-01-01 RX ADMIN — Medication 2 PUFF: at 09:56

## 2021-01-01 RX ADMIN — GUAIFENESIN 600 MG: 600 TABLET, EXTENDED RELEASE ORAL at 20:10

## 2021-01-01 RX ADMIN — NOREPINEPHRINE BITARTRATE 2 MCG/MIN: 1 INJECTION INTRAVENOUS at 04:04

## 2021-01-01 RX ADMIN — ONDANSETRON 4 MG: 2 INJECTION, SOLUTION INTRAMUSCULAR; INTRAVENOUS at 11:05

## 2021-01-01 RX ADMIN — MAGNESIUM GLUCONATE 500 MG ORAL TABLET 400 MG: 500 TABLET ORAL at 08:52

## 2021-01-01 RX ADMIN — HYDROCORTISONE SODIUM SUCCINATE 100 MG: 100 INJECTION, POWDER, FOR SOLUTION INTRAMUSCULAR; INTRAVENOUS at 20:54

## 2021-01-01 RX ADMIN — POTASSIUM CHLORIDE 40 MEQ: 1500 TABLET, EXTENDED RELEASE ORAL at 12:11

## 2021-01-01 RX ADMIN — HEPARIN SODIUM 2000 UNITS: 1000 INJECTION INTRAVENOUS; SUBCUTANEOUS at 04:17

## 2021-01-01 RX ADMIN — MICONAZOLE NITRATE: 20 POWDER TOPICAL at 09:18

## 2021-01-01 RX ADMIN — DOCUSATE SODIUM 100 MG: 100 CAPSULE, LIQUID FILLED ORAL at 10:25

## 2021-01-01 RX ADMIN — POTASSIUM CHLORIDE 40 MEQ: 1500 TABLET, EXTENDED RELEASE ORAL at 17:22

## 2021-01-01 RX ADMIN — POTASSIUM CHLORIDE 40 MEQ: 1500 TABLET, EXTENDED RELEASE ORAL at 10:28

## 2021-01-01 RX ADMIN — Medication 2 PUFF: at 07:21

## 2021-01-01 RX ADMIN — MIDODRINE HYDROCHLORIDE 10 MG: 5 TABLET ORAL at 11:52

## 2021-01-01 RX ADMIN — PANTOPRAZOLE SODIUM 40 MG: 40 INJECTION, POWDER, FOR SOLUTION INTRAVENOUS at 08:51

## 2021-01-01 RX ADMIN — INSULIN LISPRO 6 UNITS: 100 INJECTION, SOLUTION INTRAVENOUS; SUBCUTANEOUS at 04:18

## 2021-01-01 RX ADMIN — MAGNESIUM GLUCONATE 500 MG ORAL TABLET 400 MG: 500 TABLET ORAL at 09:46

## 2021-01-01 RX ADMIN — Medication 2 PUFF: at 08:01

## 2021-01-01 RX ADMIN — INSULIN LISPRO 3 UNITS: 100 INJECTION, SOLUTION INTRAVENOUS; SUBCUTANEOUS at 20:45

## 2021-01-01 RX ADMIN — MIDODRINE HYDROCHLORIDE 10 MG: 5 TABLET ORAL at 12:37

## 2021-01-01 RX ADMIN — APIXABAN 2.5 MG: 5 TABLET, FILM COATED ORAL at 09:32

## 2021-01-01 RX ADMIN — POTASSIUM CHLORIDE 20 MEQ: 1500 TABLET, EXTENDED RELEASE ORAL at 21:29

## 2021-01-01 RX ADMIN — CALCIUM GLUCONATE 1000 MG: 98 INJECTION, SOLUTION INTRAVENOUS at 15:24

## 2021-01-01 RX ADMIN — FENTANYL CITRATE 50 MCG: 50 INJECTION INTRAMUSCULAR; INTRAVENOUS at 13:18

## 2021-01-01 RX ADMIN — INSULIN GLARGINE 25 UNITS: 100 INJECTION, SOLUTION SUBCUTANEOUS at 08:07

## 2021-01-01 RX ADMIN — NOREPINEPHRINE BITARTRATE 10 MCG/MIN: 1 INJECTION INTRAVENOUS at 09:06

## 2021-01-01 RX ADMIN — ALLOPURINOL 100 MG: 100 TABLET ORAL at 13:41

## 2021-01-01 RX ADMIN — CALCIUM GLUCONATE 1000 MG: 98 INJECTION, SOLUTION INTRAVENOUS at 15:47

## 2021-01-01 RX ADMIN — GUAIFENESIN 600 MG: 600 TABLET, EXTENDED RELEASE ORAL at 21:32

## 2021-01-01 RX ADMIN — ALBUTEROL SULFATE 2.5 MG: 2.5 SOLUTION RESPIRATORY (INHALATION) at 07:28

## 2021-01-01 RX ADMIN — ASPIRIN 81 MG: 81 TABLET, CHEWABLE ORAL at 20:34

## 2021-01-01 RX ADMIN — CETIRIZINE HYDROCHLORIDE 10 MG: 10 TABLET ORAL at 09:46

## 2021-01-01 RX ADMIN — Medication 2 PUFF: at 08:00

## 2021-01-01 RX ADMIN — ALBUTEROL SULFATE 2.5 MG: 2.5 SOLUTION RESPIRATORY (INHALATION) at 13:16

## 2021-01-01 RX ADMIN — POTASSIUM CHLORIDE 10 MEQ: 7.46 INJECTION, SOLUTION INTRAVENOUS at 05:27

## 2021-01-01 RX ADMIN — ALBUTEROL SULFATE 2.5 MG: 2.5 SOLUTION RESPIRATORY (INHALATION) at 18:53

## 2021-01-01 RX ADMIN — PANTOPRAZOLE SODIUM 40 MG: 40 TABLET, DELAYED RELEASE ORAL at 05:37

## 2021-01-01 RX ADMIN — MAGNESIUM SULFATE HEPTAHYDRATE 2000 MG: 40 INJECTION, SOLUTION INTRAVENOUS at 09:47

## 2021-01-01 RX ADMIN — METOPROLOL SUCCINATE 50 MG: 50 TABLET, EXTENDED RELEASE ORAL at 09:00

## 2021-01-01 RX ADMIN — CETIRIZINE HYDROCHLORIDE 10 MG: 10 TABLET ORAL at 09:06

## 2021-01-01 RX ADMIN — ALBUTEROL SULFATE 2.5 MG: 2.5 SOLUTION RESPIRATORY (INHALATION) at 14:53

## 2021-01-01 RX ADMIN — LINEZOLID 600 MG: 600 INJECTION, SOLUTION INTRAVENOUS at 21:11

## 2021-01-01 RX ADMIN — POTASSIUM PHOSPHATE, MONOBASIC AND POTASSIUM PHOSPHATE, DIBASIC 30 MMOL: 224; 236 INJECTION, SOLUTION, CONCENTRATE INTRAVENOUS at 11:14

## 2021-01-01 RX ADMIN — PANTOPRAZOLE SODIUM 40 MG: 40 TABLET, DELAYED RELEASE ORAL at 06:02

## 2021-01-01 RX ADMIN — MEROPENEM 1000 MG: 1 INJECTION, POWDER, FOR SOLUTION INTRAVENOUS at 16:58

## 2021-01-01 RX ADMIN — STANDARDIZED SENNA CONCENTRATE 17.2 MG: 8.6 TABLET ORAL at 21:19

## 2021-01-01 RX ADMIN — Medication 20 MEQ: at 21:28

## 2021-01-01 RX ADMIN — ASPIRIN 81 MG: 81 TABLET, CHEWABLE ORAL at 22:34

## 2021-01-01 RX ADMIN — ALBUMIN (HUMAN) 25 G: 0.25 INJECTION, SOLUTION INTRAVENOUS at 09:10

## 2021-01-01 RX ADMIN — MEROPENEM 1000 MG: 1 INJECTION, POWDER, FOR SOLUTION INTRAVENOUS at 00:22

## 2021-01-01 RX ADMIN — MAGNESIUM GLUCONATE 500 MG ORAL TABLET 400 MG: 500 TABLET ORAL at 19:55

## 2021-01-01 RX ADMIN — HYDROCORTISONE SODIUM SUCCINATE 100 MG: 100 INJECTION, POWDER, FOR SOLUTION INTRAMUSCULAR; INTRAVENOUS at 10:26

## 2021-01-01 SDOH — ECONOMIC STABILITY: FOOD INSECURITY: WITHIN THE PAST 12 MONTHS, THE FOOD YOU BOUGHT JUST DIDN'T LAST AND YOU DIDN'T HAVE MONEY TO GET MORE.: NEVER TRUE

## 2021-01-01 SDOH — ECONOMIC STABILITY: TRANSPORTATION INSECURITY
IN THE PAST 12 MONTHS, HAS THE LACK OF TRANSPORTATION KEPT YOU FROM MEDICAL APPOINTMENTS OR FROM GETTING MEDICATIONS?: NO

## 2021-01-01 SDOH — HEALTH STABILITY: PHYSICAL HEALTH: ON AVERAGE, HOW MANY MINUTES DO YOU ENGAGE IN EXERCISE AT THIS LEVEL?: 0 MIN

## 2021-01-01 SDOH — ECONOMIC STABILITY: FOOD INSECURITY: WITHIN THE PAST 12 MONTHS, YOU WORRIED THAT YOUR FOOD WOULD RUN OUT BEFORE YOU GOT MONEY TO BUY MORE.: NEVER TRUE

## 2021-01-01 SDOH — ECONOMIC STABILITY: INCOME INSECURITY: IN THE LAST 12 MONTHS, WAS THERE A TIME WHEN YOU WERE NOT ABLE TO PAY THE MORTGAGE OR RENT ON TIME?: NO

## 2021-01-01 SDOH — HEALTH STABILITY: PHYSICAL HEALTH: ON AVERAGE, HOW MANY DAYS PER WEEK DO YOU ENGAGE IN MODERATE TO STRENUOUS EXERCISE (LIKE A BRISK WALK)?: 0 DAYS

## 2021-01-01 SDOH — ECONOMIC STABILITY: TRANSPORTATION INSECURITY
IN THE PAST 12 MONTHS, HAS LACK OF TRANSPORTATION KEPT YOU FROM MEETINGS, WORK, OR FROM GETTING THINGS NEEDED FOR DAILY LIVING?: NO

## 2021-01-01 SDOH — ECONOMIC STABILITY: HOUSING INSECURITY
IN THE LAST 12 MONTHS, WAS THERE A TIME WHEN YOU DID NOT HAVE A STEADY PLACE TO SLEEP OR SLEPT IN A SHELTER (INCLUDING NOW)?: NO

## 2021-01-01 ASSESSMENT — PAIN DESCRIPTION - LOCATION
LOCATION: HEAD
LOCATION: ELBOW
LOCATION: ELBOW
LOCATION: HEAD
LOCATION: SHOULDER
LOCATION: ELBOW
LOCATION: HEAD
LOCATION: HEAD;SHOULDER;BACK
LOCATION: CHEST
LOCATION: HEAD
LOCATION: ABDOMEN
LOCATION: HEAD

## 2021-01-01 ASSESSMENT — PAIN SCALES - GENERAL
PAINLEVEL_OUTOF10: 0
PAINLEVEL_OUTOF10: 5
PAINLEVEL_OUTOF10: 6
PAINLEVEL_OUTOF10: 0
PAINLEVEL_OUTOF10: 4
PAINLEVEL_OUTOF10: 0
PAINLEVEL_OUTOF10: 3
PAINLEVEL_OUTOF10: 2
PAINLEVEL_OUTOF10: 3
PAINLEVEL_OUTOF10: 5
PAINLEVEL_OUTOF10: 0
PAINLEVEL_OUTOF10: 6
PAINLEVEL_OUTOF10: 4
PAINLEVEL_OUTOF10: 0
PAINLEVEL_OUTOF10: 4
PAINLEVEL_OUTOF10: 6
PAINLEVEL_OUTOF10: 4
PAINLEVEL_OUTOF10: 1
PAINLEVEL_OUTOF10: 0
PAINLEVEL_OUTOF10: 0
PAINLEVEL_OUTOF10: 3
PAINLEVEL_OUTOF10: 3
PAINLEVEL_OUTOF10: 0
PAINLEVEL_OUTOF10: 0
PAINLEVEL_OUTOF10: 3
PAINLEVEL_OUTOF10: 0
PAINLEVEL_OUTOF10: 3
PAINLEVEL_OUTOF10: 0
PAINLEVEL_OUTOF10: 3
PAINLEVEL_OUTOF10: 0
PAINLEVEL_OUTOF10: 1
PAINLEVEL_OUTOF10: 0
PAINLEVEL_OUTOF10: 0
PAINLEVEL_OUTOF10: 1
PAINLEVEL_OUTOF10: 7
PAINLEVEL_OUTOF10: 0
PAINLEVEL_OUTOF10: 4
PAINLEVEL_OUTOF10: 5
PAINLEVEL_OUTOF10: 0
PAINLEVEL_OUTOF10: 5
PAINLEVEL_OUTOF10: 1
PAINLEVEL_OUTOF10: 0
PAINLEVEL_OUTOF10: 1
PAINLEVEL_OUTOF10: 0
PAINLEVEL_OUTOF10: 0
PAINLEVEL_OUTOF10: 10
PAINLEVEL_OUTOF10: 0
PAINLEVEL_OUTOF10: 2
PAINLEVEL_OUTOF10: 0
PAINLEVEL_OUTOF10: 1
PAINLEVEL_OUTOF10: 6
PAINLEVEL_OUTOF10: 0
PAINLEVEL_OUTOF10: 2
PAINLEVEL_OUTOF10: 0
PAINLEVEL_OUTOF10: 3
PAINLEVEL_OUTOF10: 0
PAINLEVEL_OUTOF10: 7
PAINLEVEL_OUTOF10: 0
PAINLEVEL_OUTOF10: 2
PAINLEVEL_OUTOF10: 3
PAINLEVEL_OUTOF10: 0
PAINLEVEL_OUTOF10: 3
PAINLEVEL_OUTOF10: 0
PAINLEVEL_OUTOF10: 0
PAINLEVEL_OUTOF10: 3
PAINLEVEL_OUTOF10: 9
PAINLEVEL_OUTOF10: 8
PAINLEVEL_OUTOF10: 0
PAINLEVEL_OUTOF10: 3
PAINLEVEL_OUTOF10: 0
PAINLEVEL_OUTOF10: 4
PAINLEVEL_OUTOF10: 0
PAINLEVEL_OUTOF10: 5
PAINLEVEL_OUTOF10: 4
PAINLEVEL_OUTOF10: 3
PAINLEVEL_OUTOF10: 0
PAINLEVEL_OUTOF10: 0
PAINLEVEL_OUTOF10: 5
PAINLEVEL_OUTOF10: 5
PAINLEVEL_OUTOF10: 0
PAINLEVEL_OUTOF10: 0
PAINLEVEL_OUTOF10: 5

## 2021-01-01 ASSESSMENT — PAIN SCALES - WONG BAKER

## 2021-01-01 ASSESSMENT — PULMONARY FUNCTION TESTS
PIF_VALUE: 33
PIF_VALUE: 21
FEV1/FVC_POST: 63
PIF_VALUE: 1
PIF_VALUE: 23
PIF_VALUE: 21
PIF_VALUE: 20
PIF_VALUE: 22
PIF_VALUE: 2
PIF_VALUE: 34
PIF_VALUE: 23
PIF_VALUE: 20
PIF_VALUE: 18
PIF_VALUE: 6
PIF_VALUE: 20
PIF_VALUE: 7
PIF_VALUE: 23
PIF_VALUE: 11
PIF_VALUE: 24
PIF_VALUE: 24
PIF_VALUE: 26
PIF_VALUE: 21
PIF_VALUE: 2
PIF_VALUE: 27
PIF_VALUE: 18
FEV1_PERCENT_PREDICTED_POST: 23
PIF_VALUE: 21
PIF_VALUE: 0
PIF_VALUE: 1
PIF_VALUE: 23
PIF_VALUE: 23
PIF_VALUE: 2
PIF_VALUE: 12
PIF_VALUE: 18
PIF_VALUE: 21
PIF_VALUE: 20
PIF_VALUE: 18
PIF_VALUE: 8
PIF_VALUE: 23
PIF_VALUE: 18
PIF_VALUE: 1
PIF_VALUE: 28
PIF_VALUE: 2
PIF_VALUE: 48
PIF_VALUE: 22
PIF_VALUE: 24
PIF_VALUE: 0
PIF_VALUE: 24
PIF_VALUE: 33
PIF_VALUE: 1
PIF_VALUE: 23
PIF_VALUE: 0
PIF_VALUE: 33
PIF_VALUE: 1
PIF_VALUE: 11
PIF_VALUE: 33
PIF_VALUE: 32
PIF_VALUE: 22
PIF_VALUE: 32
PIF_VALUE: 18
PIF_VALUE: 2
PIF_VALUE: 24
PIF_VALUE: 18
PIF_VALUE: 35
PIF_VALUE: 4
PIF_VALUE: 24
PIF_VALUE: 20
PIF_VALUE: 18
PIF_VALUE: 1
PIF_VALUE: 23
PIF_VALUE: 26
PIF_VALUE: 1
PIF_VALUE: 0
PIF_VALUE: 23
PIF_VALUE: 5
PIF_VALUE: 31
PIF_VALUE: 33
PIF_VALUE: 23
PIF_VALUE: 18
PIF_VALUE: 23
PIF_VALUE: 11
PIF_VALUE: 11
PIF_VALUE: 4
PIF_VALUE: 22
PIF_VALUE: 23
PIF_VALUE: 24
FEV1_PERCENT_PREDICTED_PRE: 25
PIF_VALUE: 23
PIF_VALUE: 22
PIF_VALUE: 6
PIF_VALUE: 12
PIF_VALUE: 0
PIF_VALUE: 22
PIF_VALUE: 32
PIF_VALUE: 22
PIF_VALUE: 1
PIF_VALUE: 4
PIF_VALUE: 18
PIF_VALUE: 23
PIF_VALUE: 23
PIF_VALUE: 22
PIF_VALUE: 22
PIF_VALUE: 11
PIF_VALUE: 23
PIF_VALUE: 21
PIF_VALUE: 11
PIF_VALUE: 18
PIF_VALUE: 22
PIF_VALUE: 24
PIF_VALUE: 0
PIF_VALUE: 21
PIF_VALUE: 23
PIF_VALUE: 33
PIF_VALUE: 19
PIF_VALUE: 21
PIF_VALUE: 4
PIF_VALUE: 22
PIF_VALUE: 9
PIF_VALUE: 18
PIF_VALUE: 0
PIF_VALUE: 23
PIF_VALUE: 24
FEV1/FVC_PRE: 68
PIF_VALUE: 0
PIF_VALUE: 18
PIF_VALUE: 23
PIF_VALUE: 1
PIF_VALUE: 22
PIF_VALUE: 27
PIF_VALUE: 11
PIF_VALUE: 1
PIF_VALUE: 18
PIF_VALUE: 24
PIF_VALUE: 27
PIF_VALUE: 24
PIF_VALUE: 1
PIF_VALUE: 23
PIF_VALUE: 26
PIF_VALUE: 22
PIF_VALUE: 21
PIF_VALUE: 24
PIF_VALUE: 21
PIF_VALUE: 24
PIF_VALUE: 18
PIF_VALUE: 18
PIF_VALUE: 21
PIF_VALUE: 22
PIF_VALUE: 35
PIF_VALUE: 2
PIF_VALUE: 0
PIF_VALUE: 9
PIF_VALUE: 23
PIF_VALUE: 34
PIF_VALUE: 22
PIF_VALUE: 18
PIF_VALUE: 21
PIF_VALUE: 23
PIF_VALUE: 6
PIF_VALUE: 23
PIF_VALUE: 20
PIF_VALUE: 11
PIF_VALUE: 23
PIF_VALUE: 2
PIF_VALUE: 22
PIF_VALUE: 8
PIF_VALUE: 21
PIF_VALUE: 27
PIF_VALUE: 25
PIF_VALUE: 21
PIF_VALUE: 33
PIF_VALUE: 16
PIF_VALUE: 24
PIF_VALUE: 21
PIF_VALUE: 1
PIF_VALUE: 21
PIF_VALUE: 6
PIF_VALUE: 22
PIF_VALUE: 21
PIF_VALUE: 37
PIF_VALUE: 0
PIF_VALUE: 11
PIF_VALUE: 22
PIF_VALUE: 29
PIF_VALUE: 21
PIF_VALUE: 11
PIF_VALUE: 14
PIF_VALUE: 22
PIF_VALUE: 22
PIF_VALUE: 21
PIF_VALUE: 23
PIF_VALUE: 8
PIF_VALUE: 23
PIF_VALUE: 31

## 2021-01-01 ASSESSMENT — PAIN - FUNCTIONAL ASSESSMENT: PAIN_FUNCTIONAL_ASSESSMENT: 0-10

## 2021-01-01 ASSESSMENT — PAIN DESCRIPTION - DESCRIPTORS
DESCRIPTORS: ACHING;HEADACHE
DESCRIPTORS: BURNING;ACHING
DESCRIPTORS: HEADACHE
DESCRIPTORS: HEADACHE
DESCRIPTORS: ACHING;BURNING;PRESSURE
DESCRIPTORS: HEADACHE
DESCRIPTORS: CONSTANT
DESCRIPTORS: HEADACHE
DESCRIPTORS: ACHING

## 2021-01-01 ASSESSMENT — PAIN DESCRIPTION - ORIENTATION
ORIENTATION: ANTERIOR
ORIENTATION: RIGHT;LEFT
ORIENTATION: RIGHT
ORIENTATION: MID;UPPER
ORIENTATION: ANTERIOR
ORIENTATION: RIGHT
ORIENTATION: ANTERIOR
ORIENTATION: ANTERIOR
ORIENTATION: RIGHT;LEFT
ORIENTATION: ANTERIOR

## 2021-01-01 ASSESSMENT — ENCOUNTER SYMPTOMS
NAUSEA: 0
BACK PAIN: 0
RHINORRHEA: 0
NAUSEA: 1
VOMITING: 1
NAUSEA: 0
ROS SKIN COMMENTS: SACRAL ULCER
COLOR CHANGE: 0
SHORTNESS OF BREATH: 0
SHORTNESS OF BREATH: 0
SORE THROAT: 0
ABDOMINAL PAIN: 1
RHINORRHEA: 0
ABDOMINAL PAIN: 0
VOMITING: 0
SHORTNESS OF BREATH: 0
BACK PAIN: 0
COUGH: 0
VOMITING: 0
VOMITING: 0
PHOTOPHOBIA: 0
ABDOMINAL PAIN: 1
ABDOMINAL PAIN: 1
COUGH: 0
VOMITING: 0
CONSTIPATION: 0
EYE PAIN: 0
DIARRHEA: 0
NAUSEA: 1
COUGH: 0
ABDOMINAL PAIN: 0
NAUSEA: 1
SHORTNESS OF BREATH: 0
BLOOD IN STOOL: 0
SHORTNESS OF BREATH: 1
COUGH: 0
DIARRHEA: 0
SHORTNESS OF BREATH: 1
SORE THROAT: 0
RHINORRHEA: 0
SORE THROAT: 0
DIARRHEA: 1

## 2021-01-01 ASSESSMENT — PAIN DESCRIPTION - ONSET
ONSET: ON-GOING
ONSET: GRADUAL
ONSET: ON-GOING
ONSET: GRADUAL
ONSET: SUDDEN

## 2021-01-01 ASSESSMENT — PAIN DESCRIPTION - FREQUENCY
FREQUENCY: CONTINUOUS

## 2021-01-01 ASSESSMENT — PATIENT HEALTH QUESTIONNAIRE - PHQ9
SUM OF ALL RESPONSES TO PHQ QUESTIONS 1-9: 0
2. FEELING DOWN, DEPRESSED OR HOPELESS: 0
1. LITTLE INTEREST OR PLEASURE IN DOING THINGS: 0
SUM OF ALL RESPONSES TO PHQ9 QUESTIONS 1 & 2: 0

## 2021-01-01 ASSESSMENT — SLEEP AND FATIGUE QUESTIONNAIRES
HOW LIKELY ARE YOU TO NOD OFF OR FALL ASLEEP WHILE SITTING INACTIVE IN A PUBLIC PLACE: 0
HOW LIKELY ARE YOU TO NOD OFF OR FALL ASLEEP IN A CAR, WHILE STOPPED FOR A FEW MINUTES IN TRAFFIC: 0
HOW LIKELY ARE YOU TO NOD OFF OR FALL ASLEEP WHILE SITTING QUIETLY AFTER LUNCH WITHOUT ALCOHOL: 2
HOW LIKELY ARE YOU TO NOD OFF OR FALL ASLEEP WHILE SITTING AND TALKING TO SOMEONE: 1
HOW LIKELY ARE YOU TO NOD OFF OR FALL ASLEEP WHEN YOU ARE A PASSENGER IN A CAR FOR AN HOUR WITHOUT A BREAK: 2
HOW LIKELY ARE YOU TO NOD OFF OR FALL ASLEEP WHILE LYING DOWN TO REST IN THE AFTERNOON WHEN CIRCUMSTANCES PERMIT: 3
ESS TOTAL SCORE: 11
HOW LIKELY ARE YOU TO NOD OFF OR FALL ASLEEP WHILE SITTING AND READING: 1
HOW LIKELY ARE YOU TO NOD OFF OR FALL ASLEEP WHILE WATCHING TV: 2

## 2021-01-01 ASSESSMENT — PAIN DESCRIPTION - PAIN TYPE
TYPE: ACUTE PAIN
TYPE: CHRONIC PAIN
TYPE: ACUTE PAIN
TYPE: CHRONIC PAIN
TYPE: ACUTE PAIN
TYPE: ACUTE PAIN

## 2021-01-01 ASSESSMENT — SOCIAL DETERMINANTS OF HEALTH (SDOH)
HOW OFTEN DO YOU GET TOGETHER WITH FRIENDS OR RELATIVES?: MORE THAN THREE TIMES A WEEK
IN A TYPICAL WEEK, HOW MANY TIMES DO YOU TALK ON THE PHONE WITH FAMILY, FRIENDS, OR NEIGHBORS?: MORE THAN THREE TIMES A WEEK
HOW HARD IS IT FOR YOU TO PAY FOR THE VERY BASICS LIKE FOOD, HOUSING, MEDICAL CARE, AND HEATING?: NOT HARD AT ALL

## 2021-01-01 ASSESSMENT — PAIN DESCRIPTION - PROGRESSION
CLINICAL_PROGRESSION: NOT CHANGED
CLINICAL_PROGRESSION: GRADUALLY IMPROVING

## 2021-01-11 ENCOUNTER — TELEPHONE (OUTPATIENT)
Dept: INTERNAL MEDICINE CLINIC | Age: 56
End: 2021-01-11

## 2021-01-11 NOTE — TELEPHONE ENCOUNTER
----- Message from Denny Blanco MD sent at 1/11/2021 12:56 PM EST -----  Contact: Yamileth Quinonez of [address] to do it  ----- Message -----  From: Mercy Lin  Sent: 1/11/2021   9:59 AM EST  To: MD Pattie Govea from [facility] called asking for verbal orders for speech 2 times a week for 3 weeks. She is also asking for a modified barium swallow. She says there is a company that will come to his house to do it and she just wants to know if you would sign off on that. Please advise.       [employer] [place]- [phone]

## 2021-01-12 ENCOUNTER — TELEPHONE (OUTPATIENT)
Dept: INTERNAL MEDICINE CLINIC | Age: 56
End: 2021-01-12

## 2021-01-18 ENCOUNTER — HOSPITAL ENCOUNTER (OUTPATIENT)
Age: 56
Setting detail: SPECIMEN
Discharge: HOME | End: 2021-01-18
Payer: MEDICARE

## 2021-01-18 LAB
BACTERIA: ABNORMAL /HPF
BILIRUBIN URINE: NEGATIVE
BLOOD, URINE: ABNORMAL
CLARITY: ABNORMAL
COLOR: YELLOW
EPITHELIAL CELLS, UA: ABNORMAL /HPF (ref 0–5)
GLUCOSE URINE: 500 MG/DL
KETONES, URINE: NEGATIVE MG/DL
LEUKOCYTE ESTERASE, URINE: ABNORMAL
MICROSCOPIC EXAMINATION: YES
NITRITE, URINE: NEGATIVE
PH UA: 6 (ref 5–8)
PROTEIN UA: 100 MG/DL
RBC UA: ABNORMAL /HPF (ref 0–4)
SPECIFIC GRAVITY UA: 1.02 (ref 1–1.03)
URINE TYPE: ABNORMAL
UROBILINOGEN, URINE: 0.2 E.U./DL
WBC UA: ABNORMAL /HPF (ref 0–5)

## 2021-01-18 PROCEDURE — 87086 URINE CULTURE/COLONY COUNT: CPT

## 2021-01-18 PROCEDURE — 87186 SC STD MICRODIL/AGAR DIL: CPT

## 2021-01-18 PROCEDURE — 87077 CULTURE AEROBIC IDENTIFY: CPT

## 2021-01-18 PROCEDURE — 81001 URINALYSIS AUTO W/SCOPE: CPT

## 2021-01-20 LAB
ORGANISM: ABNORMAL
ORGANISM: ABNORMAL
URINE CULTURE, ROUTINE: ABNORMAL
URINE CULTURE, ROUTINE: ABNORMAL

## 2021-01-21 ENCOUNTER — TELEPHONE (OUTPATIENT)
Dept: INTERNAL MEDICINE CLINIC | Age: 56
End: 2021-01-21

## 2021-01-21 RX ORDER — CIPROFLOXACIN 500 MG/1
500 TABLET, FILM COATED ORAL 2 TIMES DAILY
Qty: 10 TABLET | Refills: 0 | Status: SHIPPED | OUTPATIENT
Start: 2021-01-21 | End: 2021-01-27 | Stop reason: HOSPADM

## 2021-01-21 NOTE — TELEPHONE ENCOUNTER
----- Message from Chana Bustamante sent at 1/21/2021  2:26 PM EST -----  Patient wanting culture results. States he is not feeling well and his sugars are running high also.

## 2021-01-22 ENCOUNTER — TELEPHONE (OUTPATIENT)
Dept: INTERNAL MEDICINE CLINIC | Age: 56
End: 2021-01-22

## 2021-01-22 DIAGNOSIS — R13.10 DYSPHAGIA, UNSPECIFIED TYPE: Primary | ICD-10-CM

## 2021-01-26 ENCOUNTER — HOSPITAL ENCOUNTER (OUTPATIENT)
Age: 56
Setting detail: OBSERVATION
Discharge: HOME | End: 2021-01-27
Attending: STUDENT IN AN ORGANIZED HEALTH CARE EDUCATION/TRAINING PROGRAM | Admitting: INTERNAL MEDICINE
Payer: MEDICARE

## 2021-01-26 ENCOUNTER — OFFICE VISIT (OUTPATIENT)
Dept: INTERNAL MEDICINE CLINIC | Age: 56
End: 2021-01-26

## 2021-01-26 VITALS
SYSTOLIC BLOOD PRESSURE: 138 MMHG | DIASTOLIC BLOOD PRESSURE: 75 MMHG | HEART RATE: 70 BPM | RESPIRATION RATE: 18 BRPM | TEMPERATURE: 97.3 F

## 2021-01-26 DIAGNOSIS — E11.622 TYPE 2 DIABETES MELLITUS WITH OTHER SKIN ULCER, WITH LONG-TERM CURRENT USE OF INSULIN (HCC): ICD-10-CM

## 2021-01-26 DIAGNOSIS — E83.42 HYPOMAGNESEMIA: ICD-10-CM

## 2021-01-26 DIAGNOSIS — E11.621 TYPE 2 DIABETES MELLITUS WITH FOOT ULCER, WITH LONG-TERM CURRENT USE OF INSULIN (HCC): ICD-10-CM

## 2021-01-26 DIAGNOSIS — E55.9 VITAMIN D DEFICIENCY: ICD-10-CM

## 2021-01-26 DIAGNOSIS — E11.65 HYPERGLYCEMIA DUE TO DIABETES MELLITUS (HCC): ICD-10-CM

## 2021-01-26 DIAGNOSIS — L97.509 TYPE 2 DIABETES MELLITUS WITH FOOT ULCER, WITH LONG-TERM CURRENT USE OF INSULIN (HCC): ICD-10-CM

## 2021-01-26 DIAGNOSIS — I25.10 CORONARY ARTERY DISEASE INVOLVING NATIVE CORONARY ARTERY OF NATIVE HEART WITHOUT ANGINA PECTORIS: Primary | Chronic | ICD-10-CM

## 2021-01-26 DIAGNOSIS — D50.0 IRON DEFICIENCY ANEMIA DUE TO CHRONIC BLOOD LOSS: ICD-10-CM

## 2021-01-26 DIAGNOSIS — K21.9 GASTROESOPHAGEAL REFLUX DISEASE WITHOUT ESOPHAGITIS: ICD-10-CM

## 2021-01-26 DIAGNOSIS — I25.5 ISCHEMIC CARDIOMYOPATHY: Chronic | ICD-10-CM

## 2021-01-26 DIAGNOSIS — Z00.00 ROUTINE GENERAL MEDICAL EXAMINATION AT A HEALTH CARE FACILITY: Primary | ICD-10-CM

## 2021-01-26 DIAGNOSIS — Z79.4 TYPE 2 DIABETES MELLITUS WITH FOOT ULCER, WITH LONG-TERM CURRENT USE OF INSULIN (HCC): ICD-10-CM

## 2021-01-26 DIAGNOSIS — E78.2 MIXED HYPERLIPIDEMIA: Chronic | ICD-10-CM

## 2021-01-26 DIAGNOSIS — Z79.4 TYPE 2 DIABETES MELLITUS WITH OTHER SKIN ULCER, WITH LONG-TERM CURRENT USE OF INSULIN (HCC): ICD-10-CM

## 2021-01-26 DIAGNOSIS — E87.5 HYPERKALEMIA: Primary | ICD-10-CM

## 2021-01-26 DIAGNOSIS — G82.21 PARAPLEGIA, COMPLETE (HCC): ICD-10-CM

## 2021-01-26 LAB
A/G RATIO: 0.7 (ref 1.1–2.2)
A/G RATIO: 0.9 (ref 1.1–2.2)
ALBUMIN SERPL-MCNC: 3.1 G/DL (ref 3.4–5)
ALBUMIN SERPL-MCNC: 3.6 G/DL (ref 3.4–5)
ALP BLD-CCNC: 150 U/L (ref 40–129)
ALP BLD-CCNC: 158 U/L (ref 40–129)
ALT SERPL-CCNC: 13 U/L (ref 10–40)
ALT SERPL-CCNC: 14 U/L (ref 10–40)
ANION GAP SERPL CALCULATED.3IONS-SCNC: 11 MMOL/L (ref 3–16)
ANION GAP SERPL CALCULATED.3IONS-SCNC: 14 MMOL/L (ref 3–16)
AST SERPL-CCNC: 13 U/L (ref 15–37)
AST SERPL-CCNC: 14 U/L (ref 15–37)
BASOPHILS ABSOLUTE: 0 K/UL (ref 0–0.2)
BASOPHILS ABSOLUTE: 0.1 K/UL (ref 0–0.2)
BASOPHILS RELATIVE PERCENT: 0.2 %
BASOPHILS RELATIVE PERCENT: 1 %
BILIRUB SERPL-MCNC: 0.3 MG/DL (ref 0–1)
BILIRUB SERPL-MCNC: 0.3 MG/DL (ref 0–1)
BUN BLDV-MCNC: 47 MG/DL (ref 7–20)
BUN BLDV-MCNC: 48 MG/DL (ref 7–20)
CALCIUM SERPL-MCNC: 9 MG/DL (ref 8.3–10.6)
CALCIUM SERPL-MCNC: 9.5 MG/DL (ref 8.3–10.6)
CHLORIDE BLD-SCNC: 98 MMOL/L (ref 99–110)
CHLORIDE BLD-SCNC: 99 MMOL/L (ref 99–110)
CO2: 22 MMOL/L (ref 21–32)
CO2: 24 MMOL/L (ref 21–32)
CREAT SERPL-MCNC: 1.2 MG/DL (ref 0.9–1.3)
CREAT SERPL-MCNC: 1.2 MG/DL (ref 0.9–1.3)
EOSINOPHILS ABSOLUTE: 0.3 K/UL (ref 0–0.6)
EOSINOPHILS ABSOLUTE: 0.3 K/UL (ref 0–0.6)
EOSINOPHILS RELATIVE PERCENT: 2.6 %
EOSINOPHILS RELATIVE PERCENT: 3.1 %
FOLATE: 8.94 NG/ML (ref 4.78–24.2)
GFR AFRICAN AMERICAN: >60
GFR AFRICAN AMERICAN: >60
GFR NON-AFRICAN AMERICAN: >60
GFR NON-AFRICAN AMERICAN: >60
GLOBULIN: 4.2 G/DL
GLOBULIN: 4.5 G/DL
GLUCOSE BLD-MCNC: 284 MG/DL (ref 70–99)
GLUCOSE BLD-MCNC: 468 MG/DL (ref 70–99)
HCT VFR BLD CALC: 34.3 % (ref 40.5–52.5)
HCT VFR BLD CALC: 36.9 % (ref 40.5–52.5)
HEMOGLOBIN: 10.8 G/DL (ref 13.5–17.5)
HEMOGLOBIN: 11.4 G/DL (ref 13.5–17.5)
IRON SATURATION: 16 % (ref 20–50)
IRON: 47 UG/DL (ref 59–158)
LYMPHOCYTES ABSOLUTE: 0.9 K/UL (ref 1–5.1)
LYMPHOCYTES ABSOLUTE: 0.9 K/UL (ref 1–5.1)
LYMPHOCYTES RELATIVE PERCENT: 11.4 %
LYMPHOCYTES RELATIVE PERCENT: 8.7 %
MAGNESIUM: 3.2 MG/DL (ref 1.8–2.4)
MCH RBC QN AUTO: 25.4 PG (ref 26–34)
MCH RBC QN AUTO: 25.6 PG (ref 26–34)
MCHC RBC AUTO-ENTMCNC: 31 G/DL (ref 31–36)
MCHC RBC AUTO-ENTMCNC: 31.4 G/DL (ref 31–36)
MCV RBC AUTO: 81.7 FL (ref 80–100)
MCV RBC AUTO: 82 FL (ref 80–100)
MONOCYTES ABSOLUTE: 0.6 K/UL (ref 0–1.3)
MONOCYTES ABSOLUTE: 0.7 K/UL (ref 0–1.3)
MONOCYTES RELATIVE PERCENT: 6.9 %
MONOCYTES RELATIVE PERCENT: 7.1 %
NEUTROPHILS ABSOLUTE: 6.3 K/UL (ref 1.7–7.7)
NEUTROPHILS ABSOLUTE: 8 K/UL (ref 1.7–7.7)
NEUTROPHILS RELATIVE PERCENT: 77.4 %
NEUTROPHILS RELATIVE PERCENT: 81.6 %
PDW BLD-RTO: 18.3 % (ref 12.4–15.4)
PDW BLD-RTO: 18.5 % (ref 12.4–15.4)
PLATELET # BLD: 243 K/UL (ref 135–450)
PLATELET # BLD: 280 K/UL (ref 135–450)
PMV BLD AUTO: 8.5 FL (ref 5–10.5)
PMV BLD AUTO: 8.8 FL (ref 5–10.5)
POTASSIUM SERPL-SCNC: 6 MMOL/L (ref 3.5–5.1)
POTASSIUM SERPL-SCNC: 6.3 MMOL/L (ref 3.5–5.1)
RBC # BLD: 4.21 M/UL (ref 4.2–5.9)
RBC # BLD: 4.5 M/UL (ref 4.2–5.9)
SODIUM BLD-SCNC: 132 MMOL/L (ref 136–145)
SODIUM BLD-SCNC: 136 MMOL/L (ref 136–145)
TOTAL IRON BINDING CAPACITY: 301 UG/DL (ref 260–445)
TOTAL PROTEIN: 7.6 G/DL (ref 6.4–8.2)
TOTAL PROTEIN: 7.8 G/DL (ref 6.4–8.2)
VITAMIN B-12: 304 PG/ML (ref 211–911)
VITAMIN D 25-HYDROXY: 19.7 NG/ML
WBC # BLD: 8.1 K/UL (ref 4–11)
WBC # BLD: 9.8 K/UL (ref 4–11)

## 2021-01-26 PROCEDURE — G8427 DOCREV CUR MEDS BY ELIG CLIN: HCPCS | Performed by: INTERNAL MEDICINE

## 2021-01-26 PROCEDURE — 80053 COMPREHEN METABOLIC PANEL: CPT

## 2021-01-26 PROCEDURE — 3046F HEMOGLOBIN A1C LEVEL >9.0%: CPT | Performed by: INTERNAL MEDICINE

## 2021-01-26 PROCEDURE — G8484 FLU IMMUNIZE NO ADMIN: HCPCS | Performed by: INTERNAL MEDICINE

## 2021-01-26 PROCEDURE — 96375 TX/PRO/DX INJ NEW DRUG ADDON: CPT

## 2021-01-26 PROCEDURE — 96361 HYDRATE IV INFUSION ADD-ON: CPT

## 2021-01-26 PROCEDURE — 1036F TOBACCO NON-USER: CPT | Performed by: INTERNAL MEDICINE

## 2021-01-26 PROCEDURE — 2022F DILAT RTA XM EVC RTNOPTHY: CPT | Performed by: INTERNAL MEDICINE

## 2021-01-26 PROCEDURE — 93005 ELECTROCARDIOGRAM TRACING: CPT | Performed by: PHYSICIAN ASSISTANT

## 2021-01-26 PROCEDURE — 99213 OFFICE O/P EST LOW 20 MIN: CPT | Performed by: INTERNAL MEDICINE

## 2021-01-26 PROCEDURE — 99284 EMERGENCY DEPT VISIT MOD MDM: CPT

## 2021-01-26 PROCEDURE — 96374 THER/PROPH/DIAG INJ IV PUSH: CPT

## 2021-01-26 PROCEDURE — 3017F COLORECTAL CA SCREEN DOC REV: CPT | Performed by: INTERNAL MEDICINE

## 2021-01-26 PROCEDURE — 85025 COMPLETE CBC W/AUTO DIFF WBC: CPT

## 2021-01-26 PROCEDURE — G8417 CALC BMI ABV UP PARAM F/U: HCPCS | Performed by: INTERNAL MEDICINE

## 2021-01-26 RX ORDER — TORSEMIDE 20 MG/1
TABLET ORAL
Qty: 180 TABLET | Refills: 0 | Status: ON HOLD
Start: 2021-01-26 | End: 2021-03-08 | Stop reason: HOSPADM

## 2021-01-26 RX ORDER — TRAZODONE HYDROCHLORIDE 50 MG/1
TABLET ORAL
Qty: 90 TABLET | Refills: 1 | Status: SHIPPED | OUTPATIENT
Start: 2021-01-26 | End: 2021-05-14 | Stop reason: SDUPTHER

## 2021-01-26 RX ORDER — PANTOPRAZOLE SODIUM 40 MG/1
TABLET, DELAYED RELEASE ORAL
Qty: 90 TABLET | Refills: 0 | Status: SHIPPED | OUTPATIENT
Start: 2021-01-26 | End: 2021-04-13 | Stop reason: SDUPTHER

## 2021-01-26 RX ORDER — METOPROLOL SUCCINATE 100 MG/1
TABLET, EXTENDED RELEASE ORAL
Qty: 90 TABLET | Refills: 0 | Status: SHIPPED | OUTPATIENT
Start: 2021-01-26 | End: 2021-04-09 | Stop reason: SDUPTHER

## 2021-01-26 RX ORDER — PROMETHAZINE HYDROCHLORIDE 25 MG/1
25 TABLET ORAL EVERY 6 HOURS PRN
Qty: 60 TABLET | Refills: 1 | Status: SHIPPED | OUTPATIENT
Start: 2021-01-26 | End: 2021-01-01 | Stop reason: ALTCHOICE

## 2021-01-26 RX ORDER — CALCIUM GLUCONATE 94 MG/ML
1000 INJECTION, SOLUTION INTRAVENOUS ONCE
Status: COMPLETED | OUTPATIENT
Start: 2021-01-27 | End: 2021-01-27

## 2021-01-26 RX ORDER — LISINOPRIL 5 MG/1
TABLET ORAL
Qty: 90 TABLET | Refills: 0 | Status: SHIPPED
Start: 2021-01-26 | End: 2021-01-27 | Stop reason: HOSPADM

## 2021-01-26 RX ORDER — ALBUTEROL SULFATE 2.5 MG/3ML
5 SOLUTION RESPIRATORY (INHALATION) ONCE
Status: COMPLETED | OUTPATIENT
Start: 2021-01-26 | End: 2021-01-27

## 2021-01-26 RX ORDER — FUROSEMIDE 10 MG/ML
20 INJECTION INTRAMUSCULAR; INTRAVENOUS ONCE
Status: DISCONTINUED | OUTPATIENT
Start: 2021-01-27 | End: 2021-01-27

## 2021-01-26 RX ORDER — INSULIN GLARGINE 100 [IU]/ML
INJECTION, SOLUTION SUBCUTANEOUS
Qty: 30 ML | Refills: 2 | Status: SHIPPED | OUTPATIENT
Start: 2021-01-26 | End: 2021-01-01
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
## 2021-01-27 VITALS
OXYGEN SATURATION: 97 % | RESPIRATION RATE: 18 BRPM | HEART RATE: 90 BPM | TEMPERATURE: 97.7 F | WEIGHT: 272.5 LBS | DIASTOLIC BLOOD PRESSURE: 79 MMHG | SYSTOLIC BLOOD PRESSURE: 149 MMHG | HEIGHT: 74 IN | BODY MASS INDEX: 34.97 KG/M2

## 2021-01-27 DIAGNOSIS — E87.5 HIGH POTASSIUM: Primary | ICD-10-CM

## 2021-01-27 PROBLEM — E11.65 HYPERGLYCEMIA DUE TO DIABETES MELLITUS (HCC): Status: ACTIVE | Noted: 2019-10-03

## 2021-01-27 LAB
ANION GAP SERPL CALCULATED.3IONS-SCNC: 11 MMOL/L (ref 3–16)
BUN BLDV-MCNC: 45 MG/DL (ref 7–20)
CALCIUM SERPL-MCNC: 9.3 MG/DL (ref 8.3–10.6)
CHLORIDE BLD-SCNC: 96 MMOL/L (ref 99–110)
CO2: 23 MMOL/L (ref 21–32)
CREAT SERPL-MCNC: 1.2 MG/DL (ref 0.9–1.3)
EKG ATRIAL RATE: 87 BPM
EKG DIAGNOSIS: NORMAL
EKG P AXIS: 87 DEGREES
EKG P-R INTERVAL: 184 MS
EKG Q-T INTERVAL: 376 MS
EKG QRS DURATION: 94 MS
EKG QTC CALCULATION (BAZETT): 452 MS
EKG R AXIS: -26 DEGREES
EKG T AXIS: 83 DEGREES
EKG VENTRICULAR RATE: 87 BPM
ESTIMATED AVERAGE GLUCOSE: 220.2 MG/DL
GFR AFRICAN AMERICAN: >60
GFR NON-AFRICAN AMERICAN: >60
GLUCOSE BLD-MCNC: 375 MG/DL (ref 70–99)
GLUCOSE BLD-MCNC: 386 MG/DL (ref 70–99)
GLUCOSE BLD-MCNC: 414 MG/DL (ref 70–99)
HBA1C MFR BLD: 9.3 %
PERFORMED ON: ABNORMAL
PERFORMED ON: ABNORMAL
POTASSIUM REFLEX MAGNESIUM: 5.3 MMOL/L (ref 3.5–5.1)
SARS-COV-2, NAAT: NOT DETECTED
SODIUM BLD-SCNC: 130 MMOL/L (ref 136–145)

## 2021-01-27 PROCEDURE — 6370000000 HC RX 637 (ALT 250 FOR IP): Performed by: INTERNAL MEDICINE

## 2021-01-27 PROCEDURE — 99220 PR INITIAL OBSERVATION CARE/DAY 70 MINUTES: CPT | Performed by: INTERNAL MEDICINE

## 2021-01-27 PROCEDURE — G0378 HOSPITAL OBSERVATION PER HR: HCPCS

## 2021-01-27 PROCEDURE — 80048 BASIC METABOLIC PNL TOTAL CA: CPT

## 2021-01-27 PROCEDURE — 2580000003 HC RX 258: Performed by: INTERNAL MEDICINE

## 2021-01-27 PROCEDURE — 2500000003 HC RX 250 WO HCPCS: Performed by: PHYSICIAN ASSISTANT

## 2021-01-27 PROCEDURE — U0002 COVID-19 LAB TEST NON-CDC: HCPCS

## 2021-01-27 PROCEDURE — 96374 THER/PROPH/DIAG INJ IV PUSH: CPT

## 2021-01-27 PROCEDURE — 93010 ELECTROCARDIOGRAM REPORT: CPT | Performed by: INTERNAL MEDICINE

## 2021-01-27 PROCEDURE — 96361 HYDRATE IV INFUSION ADD-ON: CPT

## 2021-01-27 PROCEDURE — 96375 TX/PRO/DX INJ NEW DRUG ADDON: CPT

## 2021-01-27 PROCEDURE — 2580000003 HC RX 258: Performed by: PHYSICIAN ASSISTANT

## 2021-01-27 PROCEDURE — 6360000002 HC RX W HCPCS: Performed by: PHYSICIAN ASSISTANT

## 2021-01-27 PROCEDURE — 6370000000 HC RX 637 (ALT 250 FOR IP): Performed by: STUDENT IN AN ORGANIZED HEALTH CARE EDUCATION/TRAINING PROGRAM

## 2021-01-27 RX ORDER — ACETAMINOPHEN 650 MG/1
650 SUPPOSITORY RECTAL EVERY 6 HOURS PRN
Status: DISCONTINUED | OUTPATIENT
Start: 2021-01-27 | End: 2021-01-27 | Stop reason: HOSPADM

## 2021-01-27 RX ORDER — NICOTINE POLACRILEX 4 MG
15 LOZENGE BUCCAL PRN
Status: DISCONTINUED | OUTPATIENT
Start: 2021-01-27 | End: 2021-01-27 | Stop reason: HOSPADM

## 2021-01-27 RX ORDER — SODIUM CHLORIDE 0.9 % (FLUSH) 0.9 %
10 SYRINGE (ML) INJECTION EVERY 12 HOURS SCHEDULED
Status: DISCONTINUED | OUTPATIENT
Start: 2021-01-27 | End: 2021-01-27 | Stop reason: HOSPADM

## 2021-01-27 RX ORDER — ACETAMINOPHEN 325 MG/1
650 TABLET ORAL EVERY 6 HOURS PRN
Status: DISCONTINUED | OUTPATIENT
Start: 2021-01-27 | End: 2021-01-27 | Stop reason: HOSPADM

## 2021-01-27 RX ORDER — POLYETHYLENE GLYCOL 3350 17 G/17G
17 POWDER, FOR SOLUTION ORAL DAILY PRN
Status: DISCONTINUED | OUTPATIENT
Start: 2021-01-27 | End: 2021-01-27 | Stop reason: HOSPADM

## 2021-01-27 RX ORDER — SODIUM CHLORIDE, SODIUM LACTATE, POTASSIUM CHLORIDE, AND CALCIUM CHLORIDE .6; .31; .03; .02 G/100ML; G/100ML; G/100ML; G/100ML
500 INJECTION, SOLUTION INTRAVENOUS ONCE
Status: COMPLETED | OUTPATIENT
Start: 2021-01-27 | End: 2021-01-27

## 2021-01-27 RX ORDER — CHLOROTHIAZIDE SODIUM 500 MG/1
500 INJECTION INTRAVENOUS ONCE
Status: COMPLETED | OUTPATIENT
Start: 2021-01-27 | End: 2021-01-27

## 2021-01-27 RX ORDER — TRAZODONE HYDROCHLORIDE 50 MG/1
50 TABLET ORAL NIGHTLY PRN
Status: DISCONTINUED | OUTPATIENT
Start: 2021-01-27 | End: 2021-01-27 | Stop reason: HOSPADM

## 2021-01-27 RX ORDER — CYCLOBENZAPRINE HCL 10 MG
10 TABLET ORAL 2 TIMES DAILY PRN
Status: DISCONTINUED | OUTPATIENT
Start: 2021-01-27 | End: 2021-01-27 | Stop reason: HOSPADM

## 2021-01-27 RX ORDER — METOPROLOL SUCCINATE 50 MG/1
100 TABLET, EXTENDED RELEASE ORAL DAILY
Status: DISCONTINUED | OUTPATIENT
Start: 2021-01-27 | End: 2021-01-27 | Stop reason: HOSPADM

## 2021-01-27 RX ORDER — DEXTROSE MONOHYDRATE 50 MG/ML
100 INJECTION, SOLUTION INTRAVENOUS PRN
Status: DISCONTINUED | OUTPATIENT
Start: 2021-01-27 | End: 2021-01-27 | Stop reason: HOSPADM

## 2021-01-27 RX ORDER — TORSEMIDE 20 MG/1
20 TABLET ORAL DAILY
Status: DISCONTINUED | OUTPATIENT
Start: 2021-01-27 | End: 2021-01-27 | Stop reason: HOSPADM

## 2021-01-27 RX ORDER — ONDANSETRON 2 MG/ML
4 INJECTION INTRAMUSCULAR; INTRAVENOUS EVERY 6 HOURS PRN
Status: DISCONTINUED | OUTPATIENT
Start: 2021-01-27 | End: 2021-01-27 | Stop reason: HOSPADM

## 2021-01-27 RX ORDER — PROMETHAZINE HYDROCHLORIDE 25 MG/1
12.5 TABLET ORAL EVERY 6 HOURS PRN
Status: DISCONTINUED | OUTPATIENT
Start: 2021-01-27 | End: 2021-01-27 | Stop reason: HOSPADM

## 2021-01-27 RX ORDER — DEXTROSE MONOHYDRATE 25 G/50ML
12.5 INJECTION, SOLUTION INTRAVENOUS PRN
Status: DISCONTINUED | OUTPATIENT
Start: 2021-01-27 | End: 2021-01-27 | Stop reason: HOSPADM

## 2021-01-27 RX ORDER — INSULIN GLARGINE 100 [IU]/ML
55 INJECTION, SOLUTION SUBCUTANEOUS 2 TIMES DAILY
Status: DISCONTINUED | OUTPATIENT
Start: 2021-01-27 | End: 2021-01-27 | Stop reason: HOSPADM

## 2021-01-27 RX ORDER — ASPIRIN 81 MG/1
81 TABLET, CHEWABLE ORAL NIGHTLY
Status: DISCONTINUED | OUTPATIENT
Start: 2021-01-27 | End: 2021-01-27 | Stop reason: HOSPADM

## 2021-01-27 RX ORDER — SODIUM CHLORIDE 0.9 % (FLUSH) 0.9 %
10 SYRINGE (ML) INJECTION PRN
Status: DISCONTINUED | OUTPATIENT
Start: 2021-01-27 | End: 2021-01-27 | Stop reason: HOSPADM

## 2021-01-27 RX ORDER — PANTOPRAZOLE SODIUM 40 MG/1
40 TABLET, DELAYED RELEASE ORAL DAILY
Status: DISCONTINUED | OUTPATIENT
Start: 2021-01-27 | End: 2021-01-27 | Stop reason: HOSPADM

## 2021-01-27 RX ADMIN — SODIUM BICARBONATE 50 MEQ: 84 INJECTION INTRAVENOUS at 00:20

## 2021-01-27 RX ADMIN — SODIUM CHLORIDE, POTASSIUM CHLORIDE, SODIUM LACTATE AND CALCIUM CHLORIDE 500 ML: 600; 310; 30; 20 INJECTION, SOLUTION INTRAVENOUS at 00:20

## 2021-01-27 RX ADMIN — APIXABAN 5 MG: 5 TABLET, FILM COATED ORAL at 08:42

## 2021-01-27 RX ADMIN — METOPROLOL SUCCINATE 100 MG: 50 TABLET, EXTENDED RELEASE ORAL at 08:41

## 2021-01-27 RX ADMIN — CALCIUM GLUCONATE 1000 MG: 98 INJECTION, SOLUTION INTRAVENOUS at 00:22

## 2021-01-27 RX ADMIN — ALBUTEROL SULFATE 5 MG: 2.5 SOLUTION RESPIRATORY (INHALATION) at 00:44

## 2021-01-27 RX ADMIN — PANTOPRAZOLE SODIUM 40 MG: 40 TABLET, DELAYED RELEASE ORAL at 10:01

## 2021-01-27 RX ADMIN — INSULIN HUMAN 5 UNITS: 100 INJECTION, SOLUTION PARENTERAL at 00:31

## 2021-01-27 RX ADMIN — INSULIN GLARGINE 55 UNITS: 100 INJECTION, SOLUTION SUBCUTANEOUS at 10:02

## 2021-01-27 RX ADMIN — TORSEMIDE 20 MG: 20 TABLET ORAL at 08:42

## 2021-01-27 RX ADMIN — Medication 10 ML: at 08:42

## 2021-01-27 RX ADMIN — CHLOROTHIAZIDE SODIUM 500 MG: 500 INJECTION, POWDER, LYOPHILIZED, FOR SOLUTION INTRAVENOUS at 01:07

## 2021-01-27 RX ADMIN — SODIUM ZIRCONIUM CYCLOSILICATE 5 G: 5 POWDER, FOR SUSPENSION ORAL at 10:01

## 2021-01-27 ASSESSMENT — ENCOUNTER SYMPTOMS
NAUSEA: 0
SHORTNESS OF BREATH: 0
EYE PAIN: 0
BACK PAIN: 0
COUGH: 0
SORE THROAT: 0
ABDOMINAL PAIN: 0
VOMITING: 0

## 2021-01-27 NOTE — PROGRESS NOTES
Shift assessment complete and morning medications administered per order. Patient resting in bed at this time, no C/O thus far, will cont to monitor. Awaiting P/U time for D/C. Call light in reach.
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
## 2021-01-27 NOTE — TELEPHONE ENCOUNTER
----- Message from John Vera MD sent at 1/27/2021  8:09 AM EST -----  Mild chronic anemia stable . Mag level slightly high - potassium level high - pt was sent to hospital last night by Dr. Sherita Quiroz  Repeat K levels good today   Arrange for repeat BMP this Friday at home by home nurse       Pt was given recommendation to cut down magnesium to 6 pills per day   Vit K90, folic acid normal   Slightly low iron - take iron supplement once daily - can send ferrous sulfate 325 mg daily #90    Vit D low.  Start on vit d 35350 units once a week x 8 weeks, no refills  Also after completion start on OTC Vit D 1000 units daily

## 2021-01-27 NOTE — CARE COORDINATION
Case Management Assessment  Initial Evaluation and DC Note      Patient Name: Donal Oseguera  YOB: 1965  Diagnosis: Hyperkalemia [E87.5]  Date / Time: 1/26/2021 10:25 PM    Admission status/Date:1/26/2021 inpt  Chart Reviewed: Yes      Patient Interviewed: Yes   Family Interviewed:  No      Hospitalization in the last 30 days:  No      Health Care Decision Maker :     (CM - must 1st enter selection under Navigator - emergency contact- Health Care Decision Maker Relationship and pick relationship)   Who do you trust or have selected to make healthcare decisions for you      Met with: pt  Interview conducted  (bedside/phone):bedside    Current PCP:   Dr Supri[facility] [facility]/Care Needs:    Transportation: EMS transportation    Meal Preparation: [id number]: lives in house with son and dtr  Steps: ramp  Intent for return to present living arrangements: Yes  Identified Issues:     [address] with [address] : Yes - CC Agency:(Services)     Passport/Waiver : Yes - waiver  :   Win Gutierrez                   Phone Number:    Passport/Waiver Services: [employer], [employer]          Durable Medical Equiptment   DME Provider: none  Equipment:   Walker___Cane___RTS___ BSC___Shower Chair___Hospital Bed_x__W/C__x__Other_hoyer lift_______  02 at ____Liter(s)---wears(frequency)_______ HHN ___ CPAP_x__ BiPap___   N/A____      Home O2 Use :  No    If No for home O2---if presently on O2 during hospitalization:  No  if yes CM to follow for potential DC O2 need    Community Service Affiliation  Dialysis:  No    · Agency:  · Location:  · Dialysis Schedule:  · Phone:   · Fax: Other Community Services: (ex:PT/OT,Mental Health,Wound Clinic, Cardio/Pul [address])    DISCHARGE PLAN: Explained Case Management role/services. Chart reviewed, role of dcp explained.  Pt is from house with son and dtr and plans to return. Pt is WC bound uses [facility], however has no preference of agency when returning home. Pt is active with [facility] for SN. TC to CC and spoke with [employer] who states she does not need new orders as pt is observation. DISCHARGE ORDER  Date/Time 2021 9:56 AM  Completed by: Dixie Fisher, Case Management    Patient Name: Kip Ricks      : 1965  Admitting Diagnosis: Hyperkalemia [E87.5]    Noted discharge order. If applicable PT/OT recommendation at Discharge: NA  DME recommendation by PT/OT:NA  Confirmed discharge plan: Yes  with whom__pt_____________  If pt confirmed DC plan does family need to be contacted by CM No if yes who______  Discharge Plan: Chart reviewed. Pt discharging home and states he will need transportation home. TC to [employer] and dispatcher states pt will need to call [facility] as he always does to get transport approved through his insurance. Spoke with pt at bedside and he is aware who to call and will set up his transport, pt is aware to let his bedside RN know when he has transport arranged. No further dc needs voiced or identified. Reviewed chart. Role of discharge planner explained and patient verbalized understanding. Discharge order is noted. Has Home O2 in place on admit:  No    Pt is being d/c'd to home today. Pt's O2 sats are 97% on RA. Discharge timeout done with Lisseth. All discharge needs and concerns addressed.
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
## 2021-01-27 NOTE — TELEPHONE ENCOUNTER
----- Message from Kameron Wilson MD sent at 1/27/2021  8:09 AM EST -----  Mild chronic anemia stable . Mag level slightly high - potassium level high - pt was sent to hospital last night by Dr. Melissa Oropeza  Repeat K levels good today   Arrange for repeat BMP this Friday at home by home nurse       Pt was given recommendation to cut down magnesium to 6 pills per day   Vit G94, folic acid normal   Slightly low iron - take iron supplement once daily - can send ferrous sulfate 325 mg daily #90    Vit D low.  Start on vit d 66597 units once a week x 8 weeks, no refills  Also after completion start on OTC Vit D 1000 units daily
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
## 2021-01-27 NOTE — PROGRESS NOTES
Admission assessment complete. Pt oriented to room with call light and bedside table in reach. Will continue to monitor.

## 2021-01-27 NOTE — PLAN OF CARE
Sent in by PCP for hyperkalemia   - Treated medically in the ED  - On lisinopril for ICM, will hold. Given his cardiomyopathy he may be a good candidate for Madie Olivera chronically if hyperkalemia is the limiting factor for him being on ACE inhibitor's.   - Treat blood sugars, give 1 dose of kayexalate, repeat labs in AM  - PCP will be notified of his admission in the AM.

## 2021-01-27 NOTE — PROGRESS NOTES
Patient educated on discharge instructions as well as new medications use, dosage, administration and possible side effects. Patient verified knowledge. IV removed without difficulty and dry dressing in place. Telemetry monitor removed and returned to [facility]. Pt left facility in stable condition to home with all of their personal belongings.
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
## 2021-01-28 ENCOUNTER — CARE COORDINATION (OUTPATIENT)
Dept: CASE MANAGEMENT | Age: 56
End: 2021-01-28

## 2021-01-28 RX ORDER — ERGOCALCIFEROL 1.25 MG/1
50000 CAPSULE ORAL WEEKLY
Qty: 8 CAPSULE | Refills: 0 | Status: SHIPPED | OUTPATIENT
Start: 2021-01-28 | End: 2021-01-01 | Stop reason: ALTCHOICE

## 2021-01-28 RX ORDER — FERROUS SULFATE 325(65) MG
325 TABLET ORAL DAILY
Qty: 90 TABLET | Refills: 0 | Status: SHIPPED
Start: 2021-01-28 | End: 2021-02-05 | Stop reason: SDUPTHER

## 2021-01-28 NOTE — CARE COORDINATION
[address] Initial Outreach    Call within 2 business days of discharge: Yes    Patient: Ysabel Marti Patient : 1965   MRN: [id number]  Discharge Date: 21   RARS: No data recorded    Last Discharge [facility]       Complaint Diagnosis Description Type Department Provider    21 Abnormal Lab Hyperkalemia . .. ED to Hosp-Admission (Discharged) (Manish Amaya) Wanda Chavez MD; [facility]. .. Spoke with: Ysabel Marti     Non-face-to-face services provided:  Obtained and reviewed discharge summary and/or continuity of care documents  Education of patient/family/caregiver/guardian to support self-management-COVID education  Assessment and support for treatment adherence and medication management-med review    Challenges to be reviewed by the provider   Additional needs identified to be addressed with provider No    Patient contacted regarding [address] COVID-19 Not Detected on 2021. Patient informed of results, if available? Yes       Method of communication with provider: none    Advance Care Planning:   Does patient have an Advance Directive:  not on file. Was this a readmission? No  Patient stated reason for admission: sent by PCP for high K+  Patients top risk factors for readmission: functional physical ability and medical condition    Care Transition Nurse (CTN) contacted the patient by telephone regarding COVID-19 to perform post hospital discharge assessment. Verified name and  with patient as identifiers. Provided introduction to self, and explanation of the CTN role. CTN reviewed discharge instructions, medical action plan and red flags with patient who verbalized understanding. Patient given an opportunity to ask questions and does not have any further questions or concerns at this time. Were discharge instructions available to patient? Yes.  Reviewed appropriate site of care based on symptoms and resources available to patient including: PCP and Home health. The patient agrees to contact the PCP office for questions related to their healthcare. Medication reconciliation was performed with patient, who verbalizes understanding of administration of home medications. Covid Risk Education    Patient has following COVID-19 risk factors: obesity, heart failure, diabetes and CAD. Education provided regarding infection prevention, and signs and symptoms of COVID-19 and when to seek medical attention with patient who verbalized understanding. Discussed exposure protocols and quarantine From [facility]: Are you at higher risk for severe illness?   and given an opportunity for questions and concerns. The patient agrees to contact the COVID-19 hotline [phone] or PCP office for questions related to COVID-19. Symptoms reviewed with patient who verbalized the following symptoms: no new symptoms and no worsening symptoms. Due to no new or worsening symptoms encounter was not routed to provider for escalation. Patient/family/caregiver given information for [employer] and agrees to enroll yes  Patient's preferred e-mail: [email or web address]  Patient's preferred phone number: [phone]    Discussed follow-up appointments. If no appointment was previously scheduled, appointment scheduling offered: Yes. Is follow up appointment scheduled within 7 days of discharge? No  Non-[facility] follow up appointment(s): NA    Patient enrolled in [employer]. [facility] MD called to stop lisinopril. New mag ox 3 in am and 3 in pm. New instructions also for Lantus 60units in am and 60units in PM. . [facility], [facility]. nurse was there already this morning. He denies needs or concerns. Enrollment link to [employer] sent at this time. No further CTN outreach scheduled. CTN provided contact information for future needs. Follow Up  No future appointments.     Amado Gonzalez RN  Care Transitions Nurse  [phone] mobile

## 2021-01-29 ENCOUNTER — HOSPITAL ENCOUNTER (OUTPATIENT)
Age: 56
Discharge: HOME | End: 2021-01-29
Payer: MEDICARE

## 2021-01-29 ENCOUNTER — HOSPITAL ENCOUNTER (OUTPATIENT)
Age: 56
Setting detail: SPECIMEN
Discharge: HOME | End: 2021-01-29
Payer: MEDICARE

## 2021-01-29 PROCEDURE — U0003 INFECTIOUS AGENT DETECTION BY NUCLEIC ACID (DNA OR RNA); SEVERE ACUTE RESPIRATORY SYNDROME CORONAVIRUS 2 (SARS-COV-2) (CORONAVIRUS DISEASE [COVID-19]), AMPLIFIED PROBE TECHNIQUE, MAKING USE OF HIGH THROUGHPUT TECHNOLOGIES AS DESCRIBED BY CMS-2020-01-R: HCPCS

## 2021-01-30 LAB — SARS-COV-2: NOT DETECTED

## 2021-02-01 ENCOUNTER — HOSPITAL ENCOUNTER (OUTPATIENT)
Age: 56
Setting detail: SPECIMEN
Discharge: HOME | End: 2021-02-01
Payer: MEDICARE

## 2021-02-01 LAB
ANION GAP SERPL CALCULATED.3IONS-SCNC: 14 MMOL/L (ref 3–16)
BUN BLDV-MCNC: 52 MG/DL (ref 7–20)
CALCIUM SERPL-MCNC: 9.6 MG/DL (ref 8.3–10.6)
CHLORIDE BLD-SCNC: 96 MMOL/L (ref 99–110)
CO2: 24 MMOL/L (ref 21–32)
CREAT SERPL-MCNC: 1.5 MG/DL (ref 0.9–1.3)
GFR AFRICAN AMERICAN: 59
GFR NON-AFRICAN AMERICAN: 49
GLUCOSE BLD-MCNC: 211 MG/DL (ref 70–99)
POTASSIUM SERPL-SCNC: 5 MMOL/L (ref 3.5–5.1)
SODIUM BLD-SCNC: 134 MMOL/L (ref 136–145)

## 2021-02-01 PROCEDURE — 36415 COLL VENOUS BLD VENIPUNCTURE: CPT

## 2021-02-01 PROCEDURE — 80048 BASIC METABOLIC PNL TOTAL CA: CPT

## 2021-02-02 ENCOUNTER — ANESTHESIA EVENT (OUTPATIENT)
Dept: ENDOSCOPY | Age: 56
End: 2021-02-02
Payer: MEDICARE

## 2021-02-02 NOTE — PROGRESS NOTES
PAT call for Endoscopy procedure scheduled for 2/3/2021. Spoke with pt regarding arrival time of 0830, NPO after midnight and must have a  for procedure. Pt instructed to call physician if any questions or concerns prior to procedure, phone number provided. Pt verbalized understanding, no further questions at present time.

## 2021-02-03 ENCOUNTER — ANESTHESIA (OUTPATIENT)
Dept: ENDOSCOPY | Age: 56
End: 2021-02-03
Payer: MEDICARE

## 2021-02-03 ENCOUNTER — HOSPITAL ENCOUNTER (OUTPATIENT)
Age: 56
Setting detail: OUTPATIENT SURGERY
Discharge: HOSPICE | End: 2021-02-03
Attending: INTERNAL MEDICINE | Admitting: INTERNAL MEDICINE
Payer: MEDICARE

## 2021-02-03 VITALS
TEMPERATURE: 98.1 F | BODY MASS INDEX: 34.91 KG/M2 | HEART RATE: 113 BPM | SYSTOLIC BLOOD PRESSURE: 164 MMHG | OXYGEN SATURATION: 100 % | HEIGHT: 74 IN | WEIGHT: 272 LBS | DIASTOLIC BLOOD PRESSURE: 94 MMHG | RESPIRATION RATE: 16 BRPM

## 2021-02-03 VITALS
SYSTOLIC BLOOD PRESSURE: 154 MMHG | RESPIRATION RATE: 18 BRPM | DIASTOLIC BLOOD PRESSURE: 83 MMHG | OXYGEN SATURATION: 99 %

## 2021-02-03 PROCEDURE — 7100000010 HC PHASE II RECOVERY - FIRST 15 MIN: Performed by: INTERNAL MEDICINE

## 2021-02-03 PROCEDURE — 3700000001 HC ADD 15 MINUTES (ANESTHESIA): Performed by: INTERNAL MEDICINE

## 2021-02-03 PROCEDURE — C1769 GUIDE WIRE: HCPCS | Performed by: INTERNAL MEDICINE

## 2021-02-03 PROCEDURE — 3700000000 HC ANESTHESIA ATTENDED CARE: Performed by: INTERNAL MEDICINE

## 2021-02-03 PROCEDURE — 3609012700 HC EGD DILATION SAVORY: Performed by: INTERNAL MEDICINE

## 2021-02-03 PROCEDURE — 6360000002 HC RX W HCPCS: Performed by: NURSE ANESTHETIST, CERTIFIED REGISTERED

## 2021-02-03 PROCEDURE — 2709999900 HC NON-CHARGEABLE SUPPLY: Performed by: INTERNAL MEDICINE

## 2021-02-03 PROCEDURE — 3609017100 HC EGD: Performed by: INTERNAL MEDICINE

## 2021-02-03 PROCEDURE — 6370000000 HC RX 637 (ALT 250 FOR IP): Performed by: ANESTHESIOLOGY

## 2021-02-03 PROCEDURE — 7100000011 HC PHASE II RECOVERY - ADDTL 15 MIN: Performed by: INTERNAL MEDICINE

## 2021-02-03 PROCEDURE — 2580000003 HC RX 258: Performed by: NURSE ANESTHETIST, CERTIFIED REGISTERED

## 2021-02-03 RX ORDER — SODIUM CHLORIDE, SODIUM LACTATE, POTASSIUM CHLORIDE, CALCIUM CHLORIDE 600; 310; 30; 20 MG/100ML; MG/100ML; MG/100ML; MG/100ML
INJECTION, SOLUTION INTRAVENOUS CONTINUOUS
Status: DISCONTINUED | OUTPATIENT
Start: 2021-02-03 | End: 2021-02-03 | Stop reason: HOSPADM

## 2021-02-03 RX ORDER — IPRATROPIUM BROMIDE AND ALBUTEROL SULFATE 2.5; .5 MG/3ML; MG/3ML
SOLUTION RESPIRATORY (INHALATION)
Status: DISPENSED
Start: 2021-02-03 | End: 2021-02-04

## 2021-02-03 RX ORDER — PROPOFOL 10 MG/ML
INJECTION, EMULSION INTRAVENOUS PRN
Status: DISCONTINUED | OUTPATIENT
Start: 2021-02-03 | End: 2021-02-03 | Stop reason: SDUPTHER

## 2021-02-03 RX ORDER — IPRATROPIUM BROMIDE AND ALBUTEROL SULFATE 2.5; .5 MG/3ML; MG/3ML
1 SOLUTION RESPIRATORY (INHALATION) ONCE
Status: COMPLETED | OUTPATIENT
Start: 2021-02-03 | End: 2021-02-03

## 2021-02-03 RX ORDER — SODIUM CHLORIDE, SODIUM LACTATE, POTASSIUM CHLORIDE, CALCIUM CHLORIDE 600; 310; 30; 20 MG/100ML; MG/100ML; MG/100ML; MG/100ML
INJECTION, SOLUTION INTRAVENOUS CONTINUOUS PRN
Status: DISCONTINUED | OUTPATIENT
Start: 2021-02-03 | End: 2021-02-03 | Stop reason: SDUPTHER

## 2021-02-03 RX ADMIN — IPRATROPIUM BROMIDE AND ALBUTEROL SULFATE 1 AMPULE: .5; 3 SOLUTION RESPIRATORY (INHALATION) at 12:50

## 2021-02-03 RX ADMIN — PROPOFOL 70 MG: 10 INJECTION, EMULSION INTRAVENOUS at 12:01

## 2021-02-03 RX ADMIN — SODIUM CHLORIDE, POTASSIUM CHLORIDE, SODIUM LACTATE AND CALCIUM CHLORIDE: 600; 310; 30; 20 INJECTION, SOLUTION INTRAVENOUS at 11:54

## 2021-02-03 RX ADMIN — PROPOFOL 60 MG: 10 INJECTION, EMULSION INTRAVENOUS at 12:04

## 2021-02-03 RX ADMIN — PROPOFOL 70 MG: 10 INJECTION, EMULSION INTRAVENOUS at 11:57

## 2021-02-03 ASSESSMENT — PAIN SCALES - GENERAL
PAINLEVEL_OUTOF10: 0
PAINLEVEL_OUTOF10: 0

## 2021-02-03 NOTE — PROGRESS NOTES
Pt has paralysis from chest down. Pt was SOB with productive cough. Pt stated he usually take a nebulizer treatment to help break up the congestion.   Anesthesia ordered albuterol 3mg treatment

## 2021-02-03 NOTE — PROGRESS NOTES
Care giver Arnold Gallegos present. Discharge instructions given to her. Arnold Gallegos will be at pts house.  [facility] here to take pt home via stretcher
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
## 2021-02-03 NOTE — OP NOTE
[address]                 [address] [facility]                                OPERATIVE REPORT    PATIENT NAME: Deidre Ram                   :        1965  MED REC NO:   [id number]                          ROOM:  ACCOUNT NO:   [de-identified]                           ADMIT DATE: 2021  PROVIDER:     Boris Gutierrez MD    DATE OF PROCEDURE:  2021    PREPROCEDURE DIAGNOSIS:  Dysphagia. POSTPROCEDURE DIAGNOSES:  1. Normal esophagus with mild dysmotility. 2.  Successful dilation with 54-American savory dilator. 3.  Otherwise normal EGD. PROCEDURE PERFORMED:  EGD with savory dilation. SURGEON:  Boris Gutierrez MD    MEDICATIONS:  MAC per Anesthesia. INDICATION FOR PROCEDURE:  A 72-year-old male with extensive medical  history of diabetes, hypertension, hyperlipidemia, obesity, coronary  artery disease, COPD, osteomyelitis, CVA, congestive heart failure,  presents for evaluation of dysphagia. His symptoms have been  progressively worsening over the past year. Diagnostic EGD is being  performed to rule out high-risk pathology, such as stricture and  malignancy. DETAILS OF PROCEDURE:  Informed consent obtained after discussing risks,  benefits, and alternatives. Full history and physical was performed. The patient was classified as ASA class III. Medications were given by  Anesthesia. The cardiopulmonary status was continuously monitored  throughout the procedure. The patient was placed in left lateral  decubitus position. Once adequately sedated, a standard upper  gastroscope was inserted in the mouth and advanced under direct  visualization to second portion of the duodenum.   The Entire mucosa of  the esophagus, stomach (retroflexed and forward views), duodenum (bulb,  sweep, and second portion) was examined carefully during withdrawal. The patient tolerated the procedure well without any difficulties. FINDINGS:  ESOPHAGUS. There was evidence of mild presbyesophagus; otherwise, no  evidence of esophageal stricture, stenosis, Velasco's or malignancy. This was dilated empirically with 54-Moroccan savory dilator successfully. STOMACH:  The entire stomach appeared normal both on forward and  retroflexed views. DUODENUM:  Examined portion of the duodenum appeared normal.    SUMMARY:  1. Esophageal dysmotility, status post balloon dilation with 54-Moroccan  savory dilator. 2.  Otherwise normal EGD. RECOMMENDATIONS:  1. Discharge the patient to home when standard parameters are met. 2.  Continue PPI daily. 3.  Schedule barium esophagram with tablet if dysphagia persists. He  may also benefit from a modified barium swallow and speech therapy. 4.  We will follow the patient with you. EBL: <5mL    Chanda Fallon MD    D: 02/03/2021 12:26:59       T: 02/03/2021 12:33:38     [email or web address]  Job#: [id number]     Doc#: [id number]    CC:   Yolanda Morrow, MD Collette Sprang, MD

## 2021-02-04 ENCOUNTER — TELEPHONE (OUTPATIENT)
Dept: INTERNAL MEDICINE CLINIC | Age: 56
End: 2021-02-04

## 2021-02-04 NOTE — TELEPHONE ENCOUNTER
----- Message from Rubi Anton MD sent at 2/4/2021 11:54 AM EST -----  Contact: Jaylin Leger [phone]  Sent  ----- Message -----  From: Ingris Candelaria  Sent: 2/3/2021   2:06 PM EST  To: Rubi Anton MD    Patient states he needs prescriptions sent to pharmacy for the Vitamin D and Iron that was discussed with him.  Thank you     May Fuentes

## 2021-02-05 ENCOUNTER — HOSPITAL ENCOUNTER (OUTPATIENT)
Dept: WOUND CARE | Age: 56
Discharge: HOME | End: 2021-02-05
Payer: MEDICARE

## 2021-02-05 VITALS
HEIGHT: 74 IN | RESPIRATION RATE: 18 BRPM | SYSTOLIC BLOOD PRESSURE: 138 MMHG | WEIGHT: 270 LBS | BODY MASS INDEX: 34.65 KG/M2 | TEMPERATURE: 97 F | HEART RATE: 81 BPM | DIASTOLIC BLOOD PRESSURE: 89 MMHG

## 2021-02-05 PROCEDURE — 87070 CULTURE OTHR SPECIMN AEROBIC: CPT

## 2021-02-05 PROCEDURE — 87186 SC STD MICRODIL/AGAR DIL: CPT

## 2021-02-05 PROCEDURE — 99214 OFFICE O/P EST MOD 30 MIN: CPT

## 2021-02-05 PROCEDURE — 87077 CULTURE AEROBIC IDENTIFY: CPT

## 2021-02-05 PROCEDURE — 99214 OFFICE O/P EST MOD 30 MIN: CPT | Performed by: INTERNAL MEDICINE

## 2021-02-05 PROCEDURE — 87205 SMEAR GRAM STAIN: CPT

## 2021-02-08 PROBLEM — L89.312 PRESSURE INJURY OF RIGHT BUTTOCK, STAGE 2 (HCC): Status: ACTIVE | Noted: 2017-01-25

## 2021-02-08 LAB
GRAM STAIN RESULT: ABNORMAL
ORGANISM: ABNORMAL
ORGANISM: ABNORMAL
WOUND/ABSCESS: ABNORMAL
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
## 2021-02-08 NOTE — PLAN OF CARE
Patient is a new return who presents with a stage 2 pressure to buttocks and chronic open wound to back with hardware exposure. Dr James James did take a culture of back wound today, he instructed patient that is not ordering any antibiotics at this time but based on patient report  of increased drainage recently the culture was obtained. When the results are completed Dr. Jmaes James will contact patient if antibiotics are warranted based on the culture results. Dr. James James also would like home care to provide intermittent home care for dressing changes and wound assessment of especially the back wound until patient follows up in 2 weeks. Discharge instructions reviewed with patient, all questions answered, copy given to patient. Dressings were applied to all wounds per M.D. Instructions at this visit.

## 2021-02-08 NOTE — PROGRESS NOTES
Spoke with Mayito at [facility] is her cell number. She agrees to have nursing remain in the home for the next 3 weeks. I explained that I am reaching out to patients [place] waiver  to see options for long term nursing care in the home. She asked that we call her to update after patient is seen in wound clinic 2/19/2021. I told her that it may be after hours before our  can contact her that firday as she is in clinic and Mayito said to call on her cell number as noted above. Call place to [facility] for the [facility]  Phuong Spencer.  took message for  to return call in regards to adding a nurse to his waiver plan of care for ongoing nursing as [facility] plans to discharge him after 3 weeks.

## 2021-02-09 RX ORDER — DOXYCYCLINE HYCLATE 100 MG
100 TABLET ORAL EVERY 12 HOURS
Qty: 14 TABLET | Refills: 0 | Status: SHIPPED | OUTPATIENT
Start: 2021-02-09 | End: 2021-02-19

## 2021-02-09 RX ORDER — CIPROFLOXACIN 500 MG/1
500 TABLET, FILM COATED ORAL EVERY 12 HOURS
Qty: 14 TABLET | Refills: 0 | Status: SHIPPED | OUTPATIENT
Start: 2021-02-09 | End: 2021-02-19

## 2021-02-11 ENCOUNTER — TELEPHONE (OUTPATIENT)
Dept: WOUND CARE | Age: 56
End: 2021-02-11

## 2021-02-11 NOTE — TELEPHONE ENCOUNTER
Mr. Elisha Flores contacted me this AM to report that his glucoses were sometimes up into the 300 range over the last day or two; when I saw him late last week they were already up to around 180, which is high for him anyway. Feeling well, no F/C/D; noticed some sediment in his urine, but no abd or back pain, no resp symptoms, no N/V, etc. His back wound drainage HAD been increasing for a little while, and was definitely purulent when I saw him on Friday, so I think the hyperglycemia is likely a reaction to a flare-up in his T-spine infection. This does tend to be his pattern there (increased drainage, later hyperglycemia, later some fatigue etc, eventually fever and feeling more ill if we don't treat with Abx for at least a short course). WCx had an MRSA and a pan-(S) Pseudomonas. Called in a week of Cipro and doxy; reminded him of the interaction between Cipro and Mg/Fe. FU with me next week. Call in the meantime if fever, new symptoms that could be antibiotic side effects, etc. No change in local care. Our nurses are working with his [facility]  to see if we can secure additional nursing care from them, in lieu of the standard home-care from a private agency that he's been receiving.     Electronically signed by Tiffanie Maurer MD on 2/11/2021 at 8:18 AM

## 2021-02-19 ENCOUNTER — HOSPITAL ENCOUNTER (OUTPATIENT)
Dept: WOUND CARE | Age: 56
Discharge: HOME | End: 2021-02-19
Payer: MEDICARE

## 2021-02-19 VITALS
WEIGHT: 270 LBS | DIASTOLIC BLOOD PRESSURE: 72 MMHG | SYSTOLIC BLOOD PRESSURE: 128 MMHG | RESPIRATION RATE: 18 BRPM | HEART RATE: 77 BPM | HEIGHT: 74 IN | TEMPERATURE: 98 F | BODY MASS INDEX: 34.65 KG/M2

## 2021-02-19 DIAGNOSIS — L97.811 ULCER OF RIGHT KNEE, LIMITED TO BREAKDOWN OF SKIN (HCC): ICD-10-CM

## 2021-02-19 DIAGNOSIS — L02.213 ABSCESS OF CHEST WALL: ICD-10-CM

## 2021-02-19 PROCEDURE — 11042 DBRDMT SUBQ TIS 1ST 20SQCM/<: CPT | Performed by: INTERNAL MEDICINE

## 2021-02-19 PROCEDURE — 87070 CULTURE OTHR SPECIMN AEROBIC: CPT

## 2021-02-19 PROCEDURE — 11042 DBRDMT SUBQ TIS 1ST 20SQCM/<: CPT

## 2021-02-19 PROCEDURE — 87205 SMEAR GRAM STAIN: CPT

## 2021-02-19 RX ORDER — LIDOCAINE 40 MG/G
CREAM TOPICAL PRN
Status: DISCONTINUED | OUTPATIENT
Start: 2021-02-19 | End: 2021-02-20 | Stop reason: HOSPADM

## 2021-02-19 RX ORDER — LIDOCAINE 40 MG/G
CREAM TOPICAL ONCE
Status: DISCONTINUED | OUTPATIENT
Start: 2021-02-19 | End: 2021-02-20 | Stop reason: HOSPADM

## 2021-02-21 LAB
GRAM STAIN RESULT: NORMAL
WOUND/ABSCESS: NORMAL

## 2021-02-22 PROBLEM — L97.811 ULCER OF RIGHT KNEE, LIMITED TO BREAKDOWN OF SKIN (HCC): Status: ACTIVE | Noted: 2021-02-22

## 2021-02-22 PROBLEM — L02.213 ABSCESS OF CHEST WALL: Status: ACTIVE | Noted: 2021-02-22
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
## 2021-02-24 NOTE — PROGRESS NOTES
This note is from 2/24/21 (date of note could not be addended) Call placed to [facility] to check status of Waiver Nursing as Jessica Goode is planning to discharge him 2/26/2021. I spoke with Mendy Champion 3 weeks ago in regards to waiver getting ongoing nursing for patient. She is working on getting this covered and is very willing to work on getting help in place for cindy. I asked Mendy Champion to call me back to give update today if at all possible.

## 2021-02-26 ENCOUNTER — HOSPITAL ENCOUNTER (OUTPATIENT)
Dept: WOUND CARE | Age: 56
Discharge: HOME | End: 2021-02-26
Payer: MEDICARE

## 2021-02-26 VITALS
TEMPERATURE: 98 F | RESPIRATION RATE: 19 BRPM | SYSTOLIC BLOOD PRESSURE: 179 MMHG | HEART RATE: 97 BPM | DIASTOLIC BLOOD PRESSURE: 93 MMHG

## 2021-02-26 DIAGNOSIS — S21.101A OPEN WOUND OF RIGHT CHEST WALL, INITIAL ENCOUNTER: ICD-10-CM

## 2021-02-26 PROCEDURE — 11042 DBRDMT SUBQ TIS 1ST 20SQCM/<: CPT | Performed by: INTERNAL MEDICINE

## 2021-02-26 PROCEDURE — 11042 DBRDMT SUBQ TIS 1ST 20SQCM/<: CPT

## 2021-02-26 RX ORDER — LIDOCAINE 40 MG/G
CREAM TOPICAL ONCE
Status: DISCONTINUED | OUTPATIENT
Start: 2021-02-26 | End: 2021-02-27 | Stop reason: HOSPADM

## 2021-02-26 ASSESSMENT — PAIN SCALES - GENERAL
PAINLEVEL_OUTOF10: 0
PAINLEVEL_OUTOF10: 0

## 2021-03-01 PROBLEM — A49.02 MRSA INFECTION: Status: RESOLVED | Noted: 2017-02-07 | Resolved: 2021-03-01

## 2021-03-01 PROBLEM — S21.101A OPEN WOUND OF RIGHT CHEST WALL: Status: ACTIVE | Noted: 2021-03-01

## 2021-03-01 NOTE — PROGRESS NOTES
[facility] Progress Note    Katie Packer     : 1965    DATE OF VISIT:  2021    Subjective:     Katie Packer is a 50 y.o. male who has an infection-associated ulcer located on the chest (on the lower right). Significant symptoms or pertinent wound history since last visit: having some intermittent pain there, ongoing drainage, cloudy at times, no strong malodor, he thinks less redness, no fever or chills. Glucoses mostly in the high 100s now, which is close to his baseline when not fighting off a flare of infection. No delayed side effects from recent ABx. Additional ulcer(s) noted? yes. The chronic T-spine surgical wound; right buttock pressure ulcer; right knee cluster; the left knee and left toe wounds (just barely open here at his last visit) do seem closed again now. His current medication list consists of Alirocumab, Insulin Pen Needle, Insulin Syringe-Needle U-100, apixaban, aspirin, blood glucose test strips, cetirizine, cyclobenzaprine, docusate sodium, ferrous sulfate, insulin glargine, insulin lispro, magnesium oxide, metFORMIN, metoprolol succinate, nitroGLYCERIN, nystatin, pantoprazole, polyethylene glycol, promethazine, senna, torsemide, traZODone, and vitamin D.     Allergies: Benadryl [diphenhydramine hcl], Statins [statins], Cephalexin, Morphine, Penicillins, and Sulfa antibiotics    Objective:     Vitals:    21 1009   BP: (!) 179/93   Pulse: 97   Resp: 19   Temp: 98 °F (36.7 °C)   TempSrc: Oral   Weight: Comment: Unable to weigh patient     AAOx3, overweight, fatigued, NAD  No icterus, thrush, drug rash, abd tenderness  Left knee and great toe delicately healed (the former with some chronic hyperkeratosis, the latter with some resolving ecchymosis)  Chenta-ulcer skin: indurated and slight cristina erythema at the back; indurated and less erythema at the chest; pink, indurated and some epidermal friction changes at the buttock; pink, mild induration and improving hyperkeratosis at the right knee. Ulcer(s): T spine with usual exposed hardware, some attached hypergranulation, depth in a couple of spots, small amount of purulence; chest wound less inflamed but more visible SQ and sloughy necrosis now, still a bit of pus, apparent medial tunneling; buttock ulcer smaller, but some friction changes still, red, granular, clean; right knee cluster definitely looks better, probably less overall open surface area of wound, less fragile, more granular, clean, just a bit of fibrin. Photos also saved in electronic chart.     Today's wound measurements, per RN documentation:  Wound 02/19/21 #63, Right Chest Wall (inframammary), Abscess, Full Thickness, Onset 2/16/21-Wound Length (cm): 1.3 cm  Wound 02/19/21 #64, Right Knee Cluster, Trauma, Partial Thickness, Onset 2/16/21-Wound Length (cm): 8 cm  Wound 02/05/21 #62, Right Buttock, Pressure Injury, Stage 2, Onset 1/15/21-Wound Length (cm): 0.3 cm  [REMOVED] Wound 08/16/17 #27, Thoracic spine CLUSTER, dehisced surgical wound, full thickness, onset 8/16/2017-Wound Length (cm): 8.5 cm    Wound 02/19/21 #63, Right Chest Wall (inframammary), Abscess, Full Thickness, Onset 2/16/21-Wound Width (cm): 3 cm  Wound 02/19/21 #64, Right Knee Cluster, Trauma, Partial Thickness, Onset 2/16/21-Wound Width (cm): 9.9 cm  Wound 02/05/21 #62, Right Buttock, Pressure Injury, Stage 2, Onset 1/15/21-Wound Width (cm): 0.3 cm  [REMOVED] Wound 08/16/17 #27, Thoracic spine CLUSTER, dehisced surgical wound, full thickness, onset 8/16/2017-Wound Width (cm): 5 cm    Wound 02/19/21 #63, Right Chest Wall (inframammary), Abscess, Full Thickness, Onset 2/16/21-Wound Depth (cm): 0.6 cm  Wound 02/19/21 #64, Right Knee Cluster, Trauma, Partial Thickness, Onset 2/16/21-Wound Depth (cm): 0.1 cm  Wound 02/05/21 #62, Right Buttock, Pressure Injury, Stage 2, Onset 1/15/21-Wound Depth (cm): 0.1 cm  [REMOVED] Wound 08/16/17 #27, Thoracic spine CLUSTER, dehisced surgical wound, full thickness, onset 8/16/2017-Wound Depth (cm): (MD to measure)   ___________________    WCx from right chest wall abscess cavity was negative last week, having just finished doxy and Cipro for his back wound. 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    LIBORIO on CPAP G47.33, Z99.89    Hyperglycemia due to diabetes mellitus ([facility]) E11.65    Type 2 diabetes mellitus with diabetic peripheral angiopathy without gangrene, with long-term current use of insulin ([facility]) E11.51, Z79.4    Hyperkalemia E87.5    Abscess of chest wall (right inframammary) L02.213    Ulcer of right knee, limited to breakdown of skin ([facility] [address] L97.811    Open wound of right chest wall S21.101A       Assessment of today's active condition(s): background of DM, vascular disease, paraplegia, right BKA, lymphedema, poor mobility. Left knee and left great toe wounds, more acute, not a worry.  Right knee (I think started from hyperkeratotic changes over an old scar, with bacterial infiltration under some of that unhealthy epidermal tissue, then superficial ulceration) also looking better, could heal up with a few weeks of simple local care here, and will just need careful skin care to prevent recurrences; pressure ulcer not a worry in and of itself, but on tissue that previously had a stage 4 ulcer open for years and complicated by osteo -- need to be very careful. T-spine wound not expected to heal, but needs close follow-up to look for signs of flaring infection, so we can change local care or give occasional Abx when needed, to try to prevent systemic consequences of infection. And the new right chest wall abscess, in need of the most acute care now, including packing now that we have some depth and tunneling - thankfully, I don't think he needs more Abx there, since drainage has improved. Factors contributing to occurrence and/or persistence of the chronic ulcer include lymphedema, diabetes, chronic pressure, decreased mobility, shear force and obesity. Medical necessity of today's visit is shown by the above documentation. Sharp debridement is indicated today, based upon the exam findings in the wound(s) above. Procedure note:     Consent obtained. Time out performed per [address] [place]. Anesthetic  Anesthetic: 4% Lidocaine Cream     Using a #15 blade scalpel and forceps, I sharply debrided the chest (on the lower right) ulcer(s) down through and including the removal of subcutaneous tissue. The type(s) of tissue debrided included slough and necrotic/eschar. Total Surface Area Debrided: 3 sq cm. The ulcers were then irrigated with normal saline solution. The procedure was completed with a small amount of bleeding, and hemostasis was with pressure. The patient tolerated the procedure well, with no significant complications. The patient's level of pain during and after the procedure was monitored. Post-debridement measurements, if different from pre-debridement, are in the flowsheet as well.     Discharge plan:     Treatment in the wound care center today, per RN documentation: Wound 02/19/21 #63, Right Chest Wall (inframammary), Abscess, Full Thickness, Onset 2/16/21-Dressing/Treatment: Other (Triad, 1/4\" iodoform packing, dry cover dressing)  Wound 02/19/21 #64, Right Knee Cluster, Trauma, Partial Thickness, Onset 2/16/21-Dressing/Treatment: Other (comment)(Vashe,Xeroform,Specialist cast pad x 2 layers, Ace)  Wound 02/05/21 #62, Right Buttock, Pressure Injury, Stage 2, Onset 1/15/21-Dressing/Treatment: Other (comment)(Calmoseptine, gauze)  [REMOVED] Wound 08/16/17 #27, Thoracic spine CLUSTER, dehisced surgical wound, full thickness, onset 8/16/2017-Dressing/Treatment: Other (comment)(Vashe,Calmoseptine,Opticell Ag,Mepilex Border). Leave the right knee dressing in place for the week. No additional Abx right now. Keep focused on glucose control, protein intake, offloading, minimal chair time, frequent position changes, good transfer technique. In my medical opinion, it would risk Mr. [employer] for his home-care company to stop services at this time. I am continuing to send orders to [facility] for home care. They have stated that they are stopping services as of today or tomorrow, I believe, but Mr. Merline Moya has filed an appeal to that decision with Medicare, and I'll send a letter to [address] [place] [address] that helps to manage quality issues for Medicare patients in [place]. He and his family do not have the training and skill to pack that chest wound appropriately, and his family does not have the expertise needed to closely monitor the pressure ulcer and the T-spine dehiscence for deterioration, which has happened multiple times in the past, sometimes resulting in hospital admission. Home treatment: good handwashing before and after any dressing changes. Cleanse wound with saline or soap & water before dressing change. May use Vaseline (petrolatum), Aquaphor, Aveeno, CeraVe, Cetaphil, Eucerin, Lubriderm, etc for dry skin. Dressing type for home: generally as above, TIW for the T-spine, ideally daily for the chest (TIW is ok if it needs to be), and daily or PRN for the buttock.  Written discharge instructions given to patient. Follow up in 1 week.     Electronically signed by Mariely Diego MD on 3/1/2021 at 12:43 PM.

## 2021-03-04 ENCOUNTER — APPOINTMENT (OUTPATIENT)
Dept: GENERAL RADIOLOGY | Age: 56
End: 2021-03-04
Payer: MEDICARE

## 2021-03-04 ENCOUNTER — APPOINTMENT (OUTPATIENT)
Dept: CT IMAGING | Age: 56
End: 2021-03-04
Payer: MEDICARE

## 2021-03-04 ENCOUNTER — HOSPITAL ENCOUNTER (EMERGENCY)
Age: 56
Discharge: ANOTHER HOSPITAL | End: 2021-03-05
Attending: STUDENT IN AN ORGANIZED HEALTH CARE EDUCATION/TRAINING PROGRAM
Payer: MEDICARE

## 2021-03-04 DIAGNOSIS — R06.00 DYSPNEA, UNSPECIFIED TYPE: ICD-10-CM

## 2021-03-04 DIAGNOSIS — Z79.01 ANTICOAGULATED: ICD-10-CM

## 2021-03-04 DIAGNOSIS — L89.90: ICD-10-CM

## 2021-03-04 DIAGNOSIS — Z78.9 SELF-CATHETERIZES URINARY BLADDER: ICD-10-CM

## 2021-03-04 DIAGNOSIS — Z93.3 COLOSTOMY IN PLACE (HCC): ICD-10-CM

## 2021-03-04 DIAGNOSIS — Z87.828 HX OF SPINAL CORD INJURY: ICD-10-CM

## 2021-03-04 DIAGNOSIS — R07.9 CHEST PAIN, UNSPECIFIED TYPE: Primary | ICD-10-CM

## 2021-03-04 PROBLEM — I20.0 UNSTABLE ANGINA (HCC): Status: ACTIVE | Noted: 2021-03-04

## 2021-03-04 LAB
A/G RATIO: 0.9 (ref 1.1–2.2)
ALBUMIN SERPL-MCNC: 3.5 G/DL (ref 3.4–5)
ALP BLD-CCNC: 136 U/L (ref 40–129)
ALT SERPL-CCNC: 15 U/L (ref 10–40)
ANION GAP SERPL CALCULATED.3IONS-SCNC: 10 MMOL/L (ref 3–16)
AST SERPL-CCNC: 17 U/L (ref 15–37)
BACTERIA: ABNORMAL /HPF
BASOPHILS ABSOLUTE: 0.1 K/UL (ref 0–0.2)
BASOPHILS RELATIVE PERCENT: 0.6 %
BILIRUB SERPL-MCNC: 0.3 MG/DL (ref 0–1)
BILIRUBIN URINE: NEGATIVE
BLOOD, URINE: ABNORMAL
BUN BLDV-MCNC: 54 MG/DL (ref 7–20)
CALCIUM SERPL-MCNC: 9.1 MG/DL (ref 8.3–10.6)
CHLORIDE BLD-SCNC: 95 MMOL/L (ref 99–110)
CLARITY: ABNORMAL
CO2: 30 MMOL/L (ref 21–32)
COLOR: ABNORMAL
CREAT SERPL-MCNC: 1.2 MG/DL (ref 0.9–1.3)
EKG ATRIAL RATE: 81 BPM
EKG DIAGNOSIS: NORMAL
EKG P AXIS: 78 DEGREES
EKG P-R INTERVAL: 186 MS
EKG Q-T INTERVAL: 422 MS
EKG QRS DURATION: 82 MS
EKG QTC CALCULATION (BAZETT): 490 MS
EKG R AXIS: -24 DEGREES
EKG T AXIS: 106 DEGREES
EKG VENTRICULAR RATE: 81 BPM
EOSINOPHILS ABSOLUTE: 0.1 K/UL (ref 0–0.6)
EOSINOPHILS RELATIVE PERCENT: 1.4 %
GFR AFRICAN AMERICAN: >60
GFR NON-AFRICAN AMERICAN: >60
GLOBULIN: 4 G/DL
GLUCOSE BLD-MCNC: 252 MG/DL (ref 70–99)
GLUCOSE URINE: 250 MG/DL
HCT VFR BLD CALC: 35.4 % (ref 40.5–52.5)
HEMOGLOBIN: 11.4 G/DL (ref 13.5–17.5)
INR BLD: 1.43 (ref 0.86–1.14)
KETONES, URINE: NEGATIVE MG/DL
LEUKOCYTE ESTERASE, URINE: ABNORMAL
LYMPHOCYTES ABSOLUTE: 1 K/UL (ref 1–5.1)
LYMPHOCYTES RELATIVE PERCENT: 12.1 %
MCH RBC QN AUTO: 25.4 PG (ref 26–34)
MCHC RBC AUTO-ENTMCNC: 32.2 G/DL (ref 31–36)
MCV RBC AUTO: 79.1 FL (ref 80–100)
MICROSCOPIC EXAMINATION: YES
MONOCYTES ABSOLUTE: 0.7 K/UL (ref 0–1.3)
MONOCYTES RELATIVE PERCENT: 8.9 %
NEUTROPHILS ABSOLUTE: 6.3 K/UL (ref 1.7–7.7)
NEUTROPHILS RELATIVE PERCENT: 77 %
NITRITE, URINE: NEGATIVE
PDW BLD-RTO: 18.1 % (ref 12.4–15.4)
PH UA: 6 (ref 5–8)
PLATELET # BLD: 224 K/UL (ref 135–450)
PMV BLD AUTO: 9.1 FL (ref 5–10.5)
POTASSIUM REFLEX MAGNESIUM: 4.1 MMOL/L (ref 3.5–5.1)
PRO-BNP: 4352 PG/ML (ref 0–124)
PROTEIN UA: >=300 MG/DL
PROTHROMBIN TIME: 16.7 SEC (ref 10–13.2)
RBC # BLD: 4.47 M/UL (ref 4.2–5.9)
RBC UA: >100 /HPF (ref 0–4)
SODIUM BLD-SCNC: 135 MMOL/L (ref 136–145)
SPECIFIC GRAVITY UA: 1.02 (ref 1–1.03)
TOTAL PROTEIN: 7.5 G/DL (ref 6.4–8.2)
TROPONIN: 0.12 NG/ML
TROPONIN: 0.13 NG/ML
URINE REFLEX TO CULTURE: YES
URINE TYPE: ABNORMAL
UROBILINOGEN, URINE: 0.2 E.U./DL
WBC # BLD: 8.2 K/UL (ref 4–11)
WBC UA: ABNORMAL /HPF (ref 0–5)

## 2021-03-04 PROCEDURE — 93005 ELECTROCARDIOGRAM TRACING: CPT | Performed by: NURSE PRACTITIONER

## 2021-03-04 PROCEDURE — 85025 COMPLETE CBC W/AUTO DIFF WBC: CPT

## 2021-03-04 PROCEDURE — 51701 INSERT BLADDER CATHETER: CPT

## 2021-03-04 PROCEDURE — 83880 ASSAY OF NATRIURETIC PEPTIDE: CPT

## 2021-03-04 PROCEDURE — 80053 COMPREHEN METABOLIC PANEL: CPT

## 2021-03-04 PROCEDURE — 71260 CT THORAX DX C+: CPT

## 2021-03-04 PROCEDURE — 85610 PROTHROMBIN TIME: CPT

## 2021-03-04 PROCEDURE — 87086 URINE CULTURE/COLONY COUNT: CPT

## 2021-03-04 PROCEDURE — 84484 ASSAY OF TROPONIN QUANT: CPT

## 2021-03-04 PROCEDURE — 6370000000 HC RX 637 (ALT 250 FOR IP): Performed by: NURSE PRACTITIONER

## 2021-03-04 PROCEDURE — 6360000004 HC RX CONTRAST MEDICATION: Performed by: NURSE PRACTITIONER

## 2021-03-04 PROCEDURE — 93010 ELECTROCARDIOGRAM REPORT: CPT | Performed by: INTERNAL MEDICINE

## 2021-03-04 PROCEDURE — 81001 URINALYSIS AUTO W/SCOPE: CPT

## 2021-03-04 PROCEDURE — 87088 URINE BACTERIA CULTURE: CPT

## 2021-03-04 PROCEDURE — 71045 X-RAY EXAM CHEST 1 VIEW: CPT

## 2021-03-04 PROCEDURE — 99285 EMERGENCY DEPT VISIT HI MDM: CPT

## 2021-03-04 PROCEDURE — 87186 SC STD MICRODIL/AGAR DIL: CPT

## 2021-03-04 RX ORDER — ASPIRIN 325 MG
325 TABLET ORAL ONCE
Status: COMPLETED | OUTPATIENT
Start: 2021-03-04 | End: 2021-03-04

## 2021-03-04 RX ADMIN — ASPIRIN 325 MG: 325 TABLET, FILM COATED ORAL at 11:42

## 2021-03-04 RX ADMIN — IOPAMIDOL 85 ML: 755 INJECTION, SOLUTION INTRAVENOUS at 17:37

## 2021-03-04 ASSESSMENT — ENCOUNTER SYMPTOMS
SORE THROAT: 0
COLOR CHANGE: 0
RHINORRHEA: 0
ABDOMINAL PAIN: 0
SHORTNESS OF BREATH: 1

## 2021-03-04 ASSESSMENT — PAIN SCALES - GENERAL
PAINLEVEL_OUTOF10: 4
PAINLEVEL_OUTOF10: 3

## 2021-03-04 ASSESSMENT — PAIN DESCRIPTION - DESCRIPTORS: DESCRIPTORS: BURNING

## 2021-03-04 ASSESSMENT — PAIN DESCRIPTION - PAIN TYPE: TYPE: ACUTE PAIN

## 2021-03-04 ASSESSMENT — HEART SCORE: ECG: 0

## 2021-03-04 NOTE — ED NOTES
Transfer center called back with dr Sandra Blackburn on the phone      Janet Levy  03/04/21 [phone]
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
## 2021-03-04 NOTE — ED NOTES
Bed: 15  Expected date:   Expected time:   Means of arrival:   Comments:  [address]  03/04/21 1042

## 2021-03-04 NOTE — ED PROVIDER NOTES
I independently examined and evaluated Gelacio Baltazar. In brief, patient is a 56-year-old male with previous history of ischemic cardiomyopathy, paraplegia presenting with concerns for substernal chest pain that he states is a burning sensation on both sides of his chest that started around midnight. He states was temporarily relieved with nitro. He is also been feeling short of breath. He denies any fevers or chills. He denies any other complaints or concerns. Focused exam revealed patient resting comfortably, no acute distress. He has no sensation from the mid chest down. Strength 5 out of 5 in bilateral upper extremities. Regular rate and rhythm with no murmurs rubs or gallops. Lungs clear to auscultation bilaterally.     Labs Reviewed   CBC WITH AUTO DIFFERENTIAL - Abnormal; Notable for the following components:       Result Value    Hemoglobin 11.4 (*)     Hematocrit 35.4 (*)     MCV 79.1 (*)     MCH 25.4 (*)     RDW 18.1 (*)     All other components within normal limits    Narrative:     Performed at:  [facility] [address],  [place], [facility]   Phone [phone]   COMPREHENSIVE METABOLIC PANEL W/ REFLEX TO MG FOR LOW K - Abnormal; Notable for the following components:    Sodium 135 (*)     Chloride 95 (*)     Glucose 252 (*)     BUN 54 (*)     Albumin/Globulin Ratio 0.9 (*)     Alkaline Phosphatase 136 (*)     All other components within normal limits    Narrative:     Performed at:  [facility]) - [place] [address],  [place] [place]   Phone [phone]   TROPONIN - Abnormal; Notable for the following components:    Troponin 0.12 (*)     All other components within normal limits    Narrative:     Performed at:  [facility]) - General acute hospital [address],  [place], [facility]   Phone [phone] Abnormal; Notable for the following components:    Protime 16.7 (*)     INR 1.43 (*)     All other components within normal limits    Narrative:     Performed at:  [facility] - [facility]  [address],  [place], [facility]   Phone [phone]   BRAIN NATRIURETIC PEPTIDE - Abnormal; Notable for the following components:    Pro-BNP 4,352 (*)     All other components within normal limits    Narrative:     Performed at:  [facility]  [address],  [place], [facility]   Phone [phone]   URINE RT REFLEX TO CULTURE     XR CHEST PORTABLE   Final Result   No acute process. ED course: Patient is a 73-year-old male who presents with concerns for substernal chest pain. Full HPI as detailed above. Upon arrival in ED, vitals reassuring. Patient resting comfortably. Patient was seen in conjunction with the midlevel practitioner, please refer to her note for full details of the patient's work-up and treatment. Troponin is elevated, does appear to have a chronic elevation. Given the patient's multiple comorbidities and risk factors, he will be hospitalized for further work-up and treatment of his chest pain. All diagnostic, treatment, and disposition decisions were made by myself in conjunction with the advanced practice provider. For all further details of the patient's emergency department visit, please see the advanced practice provider's documentation. The Ekg interpreted by me shows  normal sinus rhythm with a rate of 81  Axis is   Left axis deviation  QTc is  Prolonged at 490  Intervals and Durations are unremarkable. ST Segments: nonspecific changes, diffuse T wave flattening  No significant change from prior EKG dated 1/26/2021    Comment: Please note this report has been produced using speech recognition software and may contain errors related to that system including errors in grammar, punctuation, and spelling, as well as words and phrases that may be inappropriate.  If there are any questions or concerns please feel free to contact the dictating provider for clarification.        Crow Shah MD  03/04/21 2809

## 2021-03-05 ENCOUNTER — HOSPITAL ENCOUNTER (INPATIENT)
Age: 56
LOS: 4 days | Discharge: HOME HEALTH | DRG: 292 | End: 2021-03-09
Attending: HOSPITALIST | Admitting: HOSPITALIST
Payer: MEDICARE

## 2021-03-05 ENCOUNTER — HOSPITAL ENCOUNTER (OUTPATIENT)
Dept: WOUND CARE | Age: 56
Discharge: HOME | End: 2021-03-05
Payer: MEDICARE

## 2021-03-05 VITALS
OXYGEN SATURATION: 97 % | WEIGHT: 260 LBS | HEART RATE: 76 BPM | BODY MASS INDEX: 33.37 KG/M2 | TEMPERATURE: 98.1 F | DIASTOLIC BLOOD PRESSURE: 86 MMHG | RESPIRATION RATE: 15 BRPM | HEIGHT: 74 IN | SYSTOLIC BLOOD PRESSURE: 132 MMHG

## 2021-03-05 LAB
ANION GAP SERPL CALCULATED.3IONS-SCNC: 13 MMOL/L (ref 3–16)
BASOPHILS ABSOLUTE: 0.1 K/UL (ref 0–0.2)
BASOPHILS RELATIVE PERCENT: 1 %
BUN BLDV-MCNC: 44 MG/DL (ref 7–20)
C-REACTIVE PROTEIN: 65.8 MG/L (ref 0–5.1)
CALCIUM SERPL-MCNC: 9.1 MG/DL (ref 8.3–10.6)
CHLORIDE BLD-SCNC: 93 MMOL/L (ref 99–110)
CO2: 27 MMOL/L (ref 21–32)
CREAT SERPL-MCNC: 0.9 MG/DL (ref 0.9–1.3)
EOSINOPHILS ABSOLUTE: 0.2 K/UL (ref 0–0.6)
EOSINOPHILS RELATIVE PERCENT: 2.1 %
GFR AFRICAN AMERICAN: >60
GFR NON-AFRICAN AMERICAN: >60
GLUCOSE BLD-MCNC: 167 MG/DL (ref 70–99)
GLUCOSE BLD-MCNC: 175 MG/DL (ref 70–99)
GLUCOSE BLD-MCNC: 182 MG/DL (ref 70–99)
GLUCOSE BLD-MCNC: 184 MG/DL (ref 70–99)
GLUCOSE BLD-MCNC: 189 MG/DL (ref 70–99)
HCT VFR BLD CALC: 38.1 % (ref 40.5–52.5)
HEMOGLOBIN: 12.1 G/DL (ref 13.5–17.5)
LYMPHOCYTES ABSOLUTE: 0.9 K/UL (ref 1–5.1)
LYMPHOCYTES RELATIVE PERCENT: 10.1 %
MAGNESIUM: 2.2 MG/DL (ref 1.8–2.4)
MCH RBC QN AUTO: 24.7 PG (ref 26–34)
MCHC RBC AUTO-ENTMCNC: 31.6 G/DL (ref 31–36)
MCV RBC AUTO: 78 FL (ref 80–100)
MONOCYTES ABSOLUTE: 0.7 K/UL (ref 0–1.3)
MONOCYTES RELATIVE PERCENT: 8.4 %
NEUTROPHILS ABSOLUTE: 6.9 K/UL (ref 1.7–7.7)
NEUTROPHILS RELATIVE PERCENT: 78.4 %
ORGANISM: ABNORMAL
PDW BLD-RTO: 17.7 % (ref 12.4–15.4)
PERFORMED ON: ABNORMAL
PLATELET # BLD: 231 K/UL (ref 135–450)
PMV BLD AUTO: 9 FL (ref 5–10.5)
POTASSIUM SERPL-SCNC: 3.8 MMOL/L (ref 3.5–5.1)
RBC # BLD: 4.88 M/UL (ref 4.2–5.9)
SEDIMENTATION RATE, ERYTHROCYTE: 42 MM/HR (ref 0–20)
SODIUM BLD-SCNC: 133 MMOL/L (ref 136–145)
TROPONIN: 0.1 NG/ML
URINE CULTURE, ROUTINE: ABNORMAL
WBC # BLD: 8.9 K/UL (ref 4–11)

## 2021-03-05 PROCEDURE — 2580000003 HC RX 258: Performed by: INTERNAL MEDICINE

## 2021-03-05 PROCEDURE — 2500000003 HC RX 250 WO HCPCS: Performed by: NURSE PRACTITIONER

## 2021-03-05 PROCEDURE — 6370000000 HC RX 637 (ALT 250 FOR IP): Performed by: INTERNAL MEDICINE

## 2021-03-05 PROCEDURE — 84484 ASSAY OF TROPONIN QUANT: CPT

## 2021-03-05 PROCEDURE — 36415 COLL VENOUS BLD VENIPUNCTURE: CPT

## 2021-03-05 PROCEDURE — 6360000002 HC RX W HCPCS: Performed by: INTERNAL MEDICINE

## 2021-03-05 PROCEDURE — 99222 1ST HOSP IP/OBS MODERATE 55: CPT | Performed by: INTERNAL MEDICINE

## 2021-03-05 PROCEDURE — 80048 BASIC METABOLIC PNL TOTAL CA: CPT

## 2021-03-05 PROCEDURE — 83735 ASSAY OF MAGNESIUM: CPT

## 2021-03-05 PROCEDURE — 85025 COMPLETE CBC W/AUTO DIFF WBC: CPT

## 2021-03-05 PROCEDURE — 6370000000 HC RX 637 (ALT 250 FOR IP): Performed by: NURSE PRACTITIONER

## 2021-03-05 PROCEDURE — 83036 HEMOGLOBIN GLYCOSYLATED A1C: CPT

## 2021-03-05 PROCEDURE — 6360000002 HC RX W HCPCS: Performed by: NURSE PRACTITIONER

## 2021-03-05 PROCEDURE — 1200000000 HC SEMI PRIVATE

## 2021-03-05 PROCEDURE — 86140 C-REACTIVE PROTEIN: CPT

## 2021-03-05 PROCEDURE — 85652 RBC SED RATE AUTOMATED: CPT

## 2021-03-05 RX ORDER — PANTOPRAZOLE SODIUM 40 MG/1
40 TABLET, DELAYED RELEASE ORAL DAILY
Status: DISCONTINUED | OUTPATIENT
Start: 2021-03-05 | End: 2021-03-09 | Stop reason: HOSPADM

## 2021-03-05 RX ORDER — NICOTINE POLACRILEX 4 MG
15 LOZENGE BUCCAL PRN
Status: DISCONTINUED | OUTPATIENT
Start: 2021-03-05 | End: 2021-03-09 | Stop reason: HOSPADM

## 2021-03-05 RX ORDER — ONDANSETRON 2 MG/ML
4 INJECTION INTRAMUSCULAR; INTRAVENOUS EVERY 6 HOURS PRN
Status: DISCONTINUED | OUTPATIENT
Start: 2021-03-05 | End: 2021-03-05

## 2021-03-05 RX ORDER — ACETAMINOPHEN 650 MG/1
650 SUPPOSITORY RECTAL EVERY 6 HOURS PRN
Status: DISCONTINUED | OUTPATIENT
Start: 2021-03-05 | End: 2021-03-09 | Stop reason: HOSPADM

## 2021-03-05 RX ORDER — SODIUM CHLORIDE 0.9 % (FLUSH) 0.9 %
10 SYRINGE (ML) INJECTION PRN
Status: DISCONTINUED | OUTPATIENT
Start: 2021-03-05 | End: 2021-03-09 | Stop reason: HOSPADM

## 2021-03-05 RX ORDER — NITROGLYCERIN 0.4 MG/1
0.4 TABLET SUBLINGUAL EVERY 5 MIN PRN
Status: DISCONTINUED | OUTPATIENT
Start: 2021-03-05 | End: 2021-03-09 | Stop reason: HOSPADM

## 2021-03-05 RX ORDER — DEXTROSE MONOHYDRATE 25 G/50ML
12.5 INJECTION, SOLUTION INTRAVENOUS PRN
Status: DISCONTINUED | OUTPATIENT
Start: 2021-03-05 | End: 2021-03-09 | Stop reason: HOSPADM

## 2021-03-05 RX ORDER — MENTHOL AND ZINC OXIDE .44; 20.625 G/100G; G/100G
OINTMENT TOPICAL DAILY
Status: DISCONTINUED | OUTPATIENT
Start: 2021-03-05 | End: 2021-03-05 | Stop reason: SDUPTHER

## 2021-03-05 RX ORDER — TRAZODONE HYDROCHLORIDE 50 MG/1
50 TABLET ORAL NIGHTLY PRN
Status: DISCONTINUED | OUTPATIENT
Start: 2021-03-05 | End: 2021-03-09 | Stop reason: HOSPADM

## 2021-03-05 RX ORDER — PROMETHAZINE HYDROCHLORIDE 25 MG/1
12.5 TABLET ORAL EVERY 4 HOURS PRN
Status: DISCONTINUED | OUTPATIENT
Start: 2021-03-05 | End: 2021-03-09 | Stop reason: HOSPADM

## 2021-03-05 RX ORDER — INSULIN GLARGINE 100 [IU]/ML
60 INJECTION, SOLUTION SUBCUTANEOUS 2 TIMES DAILY
Status: DISCONTINUED | OUTPATIENT
Start: 2021-03-05 | End: 2021-03-09 | Stop reason: HOSPADM

## 2021-03-05 RX ORDER — METOPROLOL SUCCINATE 25 MG/1
100 TABLET, EXTENDED RELEASE ORAL DAILY
Status: DISCONTINUED | OUTPATIENT
Start: 2021-03-05 | End: 2021-03-09 | Stop reason: HOSPADM

## 2021-03-05 RX ORDER — POTASSIUM CHLORIDE 7.45 MG/ML
10 INJECTION INTRAVENOUS PRN
Status: DISCONTINUED | OUTPATIENT
Start: 2021-03-05 | End: 2021-03-07

## 2021-03-05 RX ORDER — NICOTINE POLACRILEX 4 MG
15 LOZENGE BUCCAL PRN
Status: DISCONTINUED | OUTPATIENT
Start: 2021-03-05 | End: 2021-03-05 | Stop reason: SDUPTHER

## 2021-03-05 RX ORDER — POTASSIUM CHLORIDE 20 MEQ/1
40 TABLET, EXTENDED RELEASE ORAL PRN
Status: DISCONTINUED | OUTPATIENT
Start: 2021-03-05 | End: 2021-03-07

## 2021-03-05 RX ORDER — DOCUSATE SODIUM 100 MG/1
100 CAPSULE, LIQUID FILLED ORAL 2 TIMES DAILY
Status: DISCONTINUED | OUTPATIENT
Start: 2021-03-05 | End: 2021-03-09 | Stop reason: HOSPADM

## 2021-03-05 RX ORDER — CYCLOBENZAPRINE HCL 10 MG
10 TABLET ORAL 2 TIMES DAILY PRN
Status: DISCONTINUED | OUTPATIENT
Start: 2021-03-05 | End: 2021-03-09 | Stop reason: HOSPADM

## 2021-03-05 RX ORDER — PROMETHAZINE HYDROCHLORIDE 25 MG/1
12.5 TABLET ORAL EVERY 6 HOURS PRN
Status: DISCONTINUED | OUTPATIENT
Start: 2021-03-05 | End: 2021-03-05

## 2021-03-05 RX ORDER — SENNA PLUS 8.6 MG/1
1 TABLET ORAL 2 TIMES DAILY
Status: DISCONTINUED | OUTPATIENT
Start: 2021-03-05 | End: 2021-03-09 | Stop reason: HOSPADM

## 2021-03-05 RX ORDER — ASPIRIN 81 MG/1
81 TABLET ORAL NIGHTLY
Status: DISCONTINUED | OUTPATIENT
Start: 2021-03-05 | End: 2021-03-09 | Stop reason: HOSPADM

## 2021-03-05 RX ORDER — HYDROPHILIC CREAM
1 PASTE (GRAM) TOPICAL DAILY
Status: DISCONTINUED | OUTPATIENT
Start: 2021-03-05 | End: 2021-03-09 | Stop reason: HOSPADM

## 2021-03-05 RX ORDER — DEXTROSE MONOHYDRATE 50 MG/ML
100 INJECTION, SOLUTION INTRAVENOUS PRN
Status: DISCONTINUED | OUTPATIENT
Start: 2021-03-05 | End: 2021-03-09 | Stop reason: HOSPADM

## 2021-03-05 RX ORDER — FUROSEMIDE 10 MG/ML
40 INJECTION INTRAMUSCULAR; INTRAVENOUS 2 TIMES DAILY
Status: DISCONTINUED | OUTPATIENT
Start: 2021-03-05 | End: 2021-03-08

## 2021-03-05 RX ORDER — INSULIN GLARGINE 100 [IU]/ML
55 INJECTION, SOLUTION SUBCUTANEOUS 2 TIMES DAILY
Status: DISCONTINUED | OUTPATIENT
Start: 2021-03-05 | End: 2021-03-05

## 2021-03-05 RX ORDER — ACETAMINOPHEN 325 MG/1
650 TABLET ORAL EVERY 6 HOURS PRN
Status: DISCONTINUED | OUTPATIENT
Start: 2021-03-05 | End: 2021-03-09 | Stop reason: HOSPADM

## 2021-03-05 RX ORDER — MAGNESIUM SULFATE 1 G/100ML
1000 INJECTION INTRAVENOUS PRN
Status: DISCONTINUED | OUTPATIENT
Start: 2021-03-05 | End: 2021-03-09 | Stop reason: HOSPADM

## 2021-03-05 RX ORDER — PROMETHAZINE HYDROCHLORIDE 25 MG/ML
12.5 INJECTION, SOLUTION INTRAMUSCULAR; INTRAVENOUS EVERY 4 HOURS PRN
Status: DISCONTINUED | OUTPATIENT
Start: 2021-03-05 | End: 2021-03-09 | Stop reason: HOSPADM

## 2021-03-05 RX ORDER — DEXTROSE MONOHYDRATE 50 MG/ML
100 INJECTION, SOLUTION INTRAVENOUS PRN
Status: DISCONTINUED | OUTPATIENT
Start: 2021-03-05 | End: 2021-03-05 | Stop reason: SDUPTHER

## 2021-03-05 RX ORDER — POLYETHYLENE GLYCOL 3350 17 G/17G
17 POWDER, FOR SOLUTION ORAL DAILY PRN
Status: DISCONTINUED | OUTPATIENT
Start: 2021-03-05 | End: 2021-03-09 | Stop reason: HOSPADM

## 2021-03-05 RX ORDER — POLYETHYLENE GLYCOL 3350 17 G/17G
17 POWDER, FOR SOLUTION ORAL DAILY PRN
Status: DISCONTINUED | OUTPATIENT
Start: 2021-03-05 | End: 2021-03-05

## 2021-03-05 RX ORDER — DEXTROSE MONOHYDRATE 25 G/50ML
12.5 INJECTION, SOLUTION INTRAVENOUS PRN
Status: DISCONTINUED | OUTPATIENT
Start: 2021-03-05 | End: 2021-03-05 | Stop reason: SDUPTHER

## 2021-03-05 RX ADMIN — INSULIN LISPRO 3 UNITS: 100 INJECTION, SOLUTION INTRAVENOUS; SUBCUTANEOUS at 18:30

## 2021-03-05 RX ADMIN — APIXABAN 5 MG: 5 TABLET, FILM COATED ORAL at 09:50

## 2021-03-05 RX ADMIN — DOCUSATE SODIUM 100 MG: 100 CAPSULE, LIQUID FILLED ORAL at 23:31

## 2021-03-05 RX ADMIN — PANTOPRAZOLE SODIUM 40 MG: 40 TABLET, DELAYED RELEASE ORAL at 09:50

## 2021-03-05 RX ADMIN — INSULIN LISPRO 20 UNITS: 100 INJECTION, SOLUTION INTRAVENOUS; SUBCUTANEOUS at 13:16

## 2021-03-05 RX ADMIN — INSULIN LISPRO 20 UNITS: 100 INJECTION, SOLUTION INTRAVENOUS; SUBCUTANEOUS at 18:31

## 2021-03-05 RX ADMIN — APIXABAN 5 MG: 5 TABLET, FILM COATED ORAL at 22:33

## 2021-03-05 RX ADMIN — INSULIN LISPRO 3 UNITS: 100 INJECTION, SOLUTION INTRAVENOUS; SUBCUTANEOUS at 13:16

## 2021-03-05 RX ADMIN — SENNOSIDES 8.6 MG: 8.6 TABLET, FILM COATED ORAL at 23:33

## 2021-03-05 RX ADMIN — METOPROLOL SUCCINATE 100 MG: 25 TABLET, EXTENDED RELEASE ORAL at 09:26

## 2021-03-05 RX ADMIN — INSULIN LISPRO 2 UNITS: 100 INJECTION, SOLUTION INTRAVENOUS; SUBCUTANEOUS at 22:33

## 2021-03-05 RX ADMIN — FUROSEMIDE 40 MG: 10 INJECTION, SOLUTION INTRAMUSCULAR; INTRAVENOUS at 18:33

## 2021-03-05 RX ADMIN — ANORECTAL OINTMENT: 15.7; .44; 24; 20.6 OINTMENT TOPICAL at 16:34

## 2021-03-05 RX ADMIN — Medication 10 ML: at 09:50

## 2021-03-05 RX ADMIN — INSULIN GLARGINE 60 UNITS: 100 INJECTION, SOLUTION SUBCUTANEOUS at 22:33

## 2021-03-05 RX ADMIN — TRAZODONE HYDROCHLORIDE 50 MG: 50 TABLET ORAL at 22:33

## 2021-03-05 RX ADMIN — ASPIRIN 81 MG: 81 TABLET, COATED ORAL at 22:33

## 2021-03-05 RX ADMIN — PROMETHAZINE HYDROCHLORIDE 12.5 MG: 25 INJECTION INTRAMUSCULAR; INTRAVENOUS at 06:22

## 2021-03-05 RX ADMIN — MAGNESIUM OXIDE TAB 400 MG (241.3 MG ELEMENTAL MG) 600 MG: 400 (241.3 MG) TAB at 23:32

## 2021-03-05 RX ADMIN — Medication 1 SQUIRT: at 16:34

## 2021-03-05 ASSESSMENT — PAIN SCALES - GENERAL
PAINLEVEL_OUTOF10: 2
PAINLEVEL_OUTOF10: 4
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
## 2021-03-05 NOTE — ED NOTES
Complete bed change when patient moved to new bed. Patient has wound to the upper back between the scapula. Existing mepalex replaced with new, as the existing dressing was saturated and not adhering to skin.      Britney Pitt RN  03/04/21 [address] Garry Saldana RN  03/04/21 5103
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
## 2021-03-05 NOTE — PROGRESS NOTES
Patient admitted from [facility] via ambulance transport. Transferred to bed. Tele request faxed to [address]. Hospitalist notified of admission. Vital signs obtained. Oriented to room, call light and environment. Questions answered. Bed placed in low position. Call light explained and within reach.  Justin Lee RN

## 2021-03-05 NOTE — CARE COORDINATION
CASE MANAGEMENT INITIAL ASSESSMENT      Reviewed chart and completed assessment via telephone with: Pt via room phone   Explained Case Management role/services. Primary contact information: Wife Roberto Mcdaniel Decision Maker :     Vanna Freeman [phone]    Can this person be reached and be able to respond quickly, such as within a few minutes or hours? Yes  Who would be your back-up decision maker? Name  Son Radha Rodriguez   Phone Number: [phone]    Admit date/status: 3/5/21  Diagnosis: Unstable angina    Is this a Readmission?:  No      Insurance: Medicare, [facility] required for SNF: No       3 night stay required: Yes    Living arrangements, Adls, care needs, prior to admission: Pt lives at home with his son and daughter. Home has ramp entry. Pt is dependant for all care and has prateek lift at home. He has a home health aide M-F 9A-3p and has been approved for night aide 7 days per week from 8p - 10p though passport. Pt uses [facility] for all transport - he must go stretcher. Pt states that he calls [facility] to arrange. He states that he usually calls [facility] from hospital to set his own transport up. Transportation: [address] at home:  Walker__Cane__RTS__ BSC__Shower Chair__  02__ HHN__ CPAP_x_  BiPap__  Hospital Bed_x_ W/C_x__ Other___Hoyer Lift, Life Alert _______    Services in the home and/or outpatient, prior to admission: Active with [employer] for HHA services, [facility] 7x/ wk for wound dressing changes. PT/OT recs: Not ordered, pt at [facility] [employer] Notification (HEN): Needed for SNF    Barriers to discharge: None     Plan/comments: Pt plans on returning home with current services. He will need new [facility] orders. Pt states that he is able to set up own transport if needed as he has done so in the past from [facility]. CM will continue to follow and assist with needs as able.       Etta SALEEM Cricket Trevino on chart for MD oliva

## 2021-03-05 NOTE — ED NOTES
Patient Bed Assignment     # 702-3    Report# [phone]    [id number] @ 0400     [employer]  03/05/21 7539

## 2021-03-05 NOTE — PROGRESS NOTES
Comprehensive Nutrition Assessment    Type and Reason for Visit:  Initial, Positive Nutrition Screen, Wound    Nutrition Recommendations/Plan:   1. Continue carb control diet   2. Add Proteinex daily - monitor acceptance. Encourage pt to mix with beverage of choice  3. Encourage PO and protein intakes for wound healing   4. Monitor nutrition adequacy, pertinent labs, bowel habits, wt changes, and clinical progress    Nutrition Assessment:  51 yo male admitted with ischemic cardiomyopathy. Hx of CAD, CHF, COPD, DM, and paraplegia. Pt nutritionally compromised AEB wounds. Pt reports good appetite and PO intakes now and PTA. At home pt focuses on consuming enough protein at all meals for increased protein needs. Pt favorable to adding ONS for optimal nutrition. No further nutrition questions or concerns at this time, will continue to monitor. Malnutrition Assessment:  Malnutrition Status: At risk for malnutrition (Comment)      Estimated Daily Nutrient Needs:  Energy (kcal):  3887-0298 kcal; Weight Used for Energy Requirements:  Adjusted(75 kg)     Protein (g):  112-150 g; Weight Used for Protein Requirements:  Ideal(1.5-2.0 g/kg)        Fluid (ml/day):   ; Method Used for Fluid Requirements:  1 ml/kcal      Nutrition Related Findings:  BG elevated on admission. Active BS. Wounds:  Wound Consult Pending       Current Nutrition Therapies:    DIET CARB CONTROL; Dietary Nutrition Supplements: Protein Modular    Anthropometric Measures:  · Height: 6' 2\" (188 cm)  · Current Body Weight: 260 lb (117.9 kg)   · Ideal Body Weight: 190 lbs; % Ideal Body Weight 136.8 %   · BMI: 33.4  · Adjusted Body Weight: 294.8;  Amputation, Paraplegia   · Adjusted BMI: 37.9    · BMI Categories: Obese Class 2 (BMI 35.0 -39.9)       Nutrition Diagnosis:   · Increased nutrient needs related to increase demand for energy/nutrients as evidenced by wounds      Nutrition Interventions:   Food and/or Nutrient Delivery:  Continue Current Diet, Start Oral Nutrition Supplement  Nutrition Education/Counseling:  No recommendation at this time   Coordination of Nutrition Care:  Continue to monitor while inpatient    Goals:  Pt will consume greater than 50% of meals and ONS w/o further skin breakdown       Nutrition Monitoring and Evaluation:   Behavioral-Environmental Outcomes:  None Identified   Food/Nutrient Intake Outcomes:  Food and Nutrient Intake, Supplement Intake  Physical Signs/Symptoms Outcomes:  Biochemical Data, Skin, Weight     Discharge Planning:    Continue current diet, Continue Oral Nutrition Supplement     Electronically signed by Madi Carlson MS, RD, LD on 3/5/21 at 2:05 PM EST    Contact: [phone]
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
## 2021-03-05 NOTE — PROGRESS NOTES
4 Eyes Skin Assessment     The patient is being assess for   Admission    I agree that 2 RN's have performed a thorough Head to Toe Skin Assessment on the patient. ALL assessment sites listed below have been assessed. Areas assessed by both nurses:   [x]   Head, Face, and Ears   [x]   Shoulders, Back, and Chest, Abdomen  [x]   Arms, Elbows, and Hands   [x]   Coccyx, Sacrum, and Ischium  [x]   Legs, Feet, and Heels          **SHARE this note so that the co-signing nurse is able to place an eSignature**    Co-signer eSignature: Electronically signed by Lasha Bravo RN on 3/5/21 at 12:56 PM EST    Does the Patient have Skin Breakdown?   Yes [facility] WOUND CARE was Initiated documentation include the Chet[place]-wound, Wound Assessment, Measurements, Dressing Treatment, Drainage, and Color\",          Ismael Prevention initiated:  Yes   Wound Care Orders initiated:  Yes      [id number]  nurse consulted for Pressure Injury (Stage 3,4, Unstageable, DTI, NWPT, Complex wounds)and New or Established Ostomies:  Yes      Primary Nurse eSignature: Electronically signed by Malina Appiah on 3/5/21 at 12:28 PM EST

## 2021-03-06 LAB
ANION GAP SERPL CALCULATED.3IONS-SCNC: 11 MMOL/L (ref 3–16)
BASOPHILS ABSOLUTE: 0.1 K/UL (ref 0–0.2)
BASOPHILS RELATIVE PERCENT: 0.9 %
BUN BLDV-MCNC: 42 MG/DL (ref 7–20)
CALCIUM SERPL-MCNC: 8.9 MG/DL (ref 8.3–10.6)
CHLORIDE BLD-SCNC: 94 MMOL/L (ref 99–110)
CO2: 30 MMOL/L (ref 21–32)
CREAT SERPL-MCNC: 1.1 MG/DL (ref 0.9–1.3)
EOSINOPHILS ABSOLUTE: 0.2 K/UL (ref 0–0.6)
EOSINOPHILS RELATIVE PERCENT: 2.1 %
ESTIMATED AVERAGE GLUCOSE: 217.3 MG/DL
GFR AFRICAN AMERICAN: >60
GFR NON-AFRICAN AMERICAN: >60
GLUCOSE BLD-MCNC: 120 MG/DL (ref 70–99)
GLUCOSE BLD-MCNC: 127 MG/DL (ref 70–99)
GLUCOSE BLD-MCNC: 139 MG/DL (ref 70–99)
GLUCOSE BLD-MCNC: 156 MG/DL (ref 70–99)
GLUCOSE BLD-MCNC: 244 MG/DL (ref 70–99)
HBA1C MFR BLD: 9.2 %
HCT VFR BLD CALC: 37.6 % (ref 40.5–52.5)
HEMOGLOBIN: 11.8 G/DL (ref 13.5–17.5)
LYMPHOCYTES ABSOLUTE: 1.1 K/UL (ref 1–5.1)
LYMPHOCYTES RELATIVE PERCENT: 10.8 %
MAGNESIUM: 2.2 MG/DL (ref 1.8–2.4)
MCH RBC QN AUTO: 24.5 PG (ref 26–34)
MCHC RBC AUTO-ENTMCNC: 31.3 G/DL (ref 31–36)
MCV RBC AUTO: 78.5 FL (ref 80–100)
MONOCYTES ABSOLUTE: 0.9 K/UL (ref 0–1.3)
MONOCYTES RELATIVE PERCENT: 9.5 %
NEUTROPHILS ABSOLUTE: 7.7 K/UL (ref 1.7–7.7)
NEUTROPHILS RELATIVE PERCENT: 76.7 %
PDW BLD-RTO: 17.9 % (ref 12.4–15.4)
PERFORMED ON: ABNORMAL
PLATELET # BLD: 260 K/UL (ref 135–450)
PMV BLD AUTO: 9.6 FL (ref 5–10.5)
POTASSIUM SERPL-SCNC: 3.5 MMOL/L (ref 3.5–5.1)
PRO-BNP: 4695 PG/ML (ref 0–124)
RBC # BLD: 4.79 M/UL (ref 4.2–5.9)
SODIUM BLD-SCNC: 135 MMOL/L (ref 136–145)
WBC # BLD: 10 K/UL (ref 4–11)

## 2021-03-06 PROCEDURE — 83880 ASSAY OF NATRIURETIC PEPTIDE: CPT

## 2021-03-06 PROCEDURE — 99233 SBSQ HOSP IP/OBS HIGH 50: CPT | Performed by: INTERNAL MEDICINE

## 2021-03-06 PROCEDURE — 80048 BASIC METABOLIC PNL TOTAL CA: CPT

## 2021-03-06 PROCEDURE — 1200000000 HC SEMI PRIVATE

## 2021-03-06 PROCEDURE — 2580000003 HC RX 258: Performed by: INTERNAL MEDICINE

## 2021-03-06 PROCEDURE — 85025 COMPLETE CBC W/AUTO DIFF WBC: CPT

## 2021-03-06 PROCEDURE — 83735 ASSAY OF MAGNESIUM: CPT

## 2021-03-06 PROCEDURE — 6370000000 HC RX 637 (ALT 250 FOR IP): Performed by: INTERNAL MEDICINE

## 2021-03-06 PROCEDURE — 6360000002 HC RX W HCPCS: Performed by: NURSE PRACTITIONER

## 2021-03-06 PROCEDURE — 6370000000 HC RX 637 (ALT 250 FOR IP): Performed by: NURSE PRACTITIONER

## 2021-03-06 RX ADMIN — METOPROLOL SUCCINATE 100 MG: 25 TABLET, EXTENDED RELEASE ORAL at 08:57

## 2021-03-06 RX ADMIN — FUROSEMIDE 40 MG: 10 INJECTION, SOLUTION INTRAMUSCULAR; INTRAVENOUS at 08:56

## 2021-03-06 RX ADMIN — INSULIN GLARGINE 60 UNITS: 100 INJECTION, SOLUTION SUBCUTANEOUS at 21:16

## 2021-03-06 RX ADMIN — INSULIN LISPRO 20 UNITS: 100 INJECTION, SOLUTION INTRAVENOUS; SUBCUTANEOUS at 12:58

## 2021-03-06 RX ADMIN — INSULIN GLARGINE 60 UNITS: 100 INJECTION, SOLUTION SUBCUTANEOUS at 09:00

## 2021-03-06 RX ADMIN — ASPIRIN 81 MG: 81 TABLET, COATED ORAL at 21:12

## 2021-03-06 RX ADMIN — DOCUSATE SODIUM 100 MG: 100 CAPSULE, LIQUID FILLED ORAL at 21:12

## 2021-03-06 RX ADMIN — MAGNESIUM OXIDE TAB 400 MG (241.3 MG ELEMENTAL MG) 600 MG: 400 (241.3 MG) TAB at 09:55

## 2021-03-06 RX ADMIN — ANORECTAL OINTMENT: 15.7; .44; 24; 20.6 OINTMENT TOPICAL at 09:02

## 2021-03-06 RX ADMIN — APIXABAN 5 MG: 5 TABLET, FILM COATED ORAL at 21:12

## 2021-03-06 RX ADMIN — TRAZODONE HYDROCHLORIDE 50 MG: 50 TABLET ORAL at 21:11

## 2021-03-06 RX ADMIN — PANTOPRAZOLE SODIUM 40 MG: 40 TABLET, DELAYED RELEASE ORAL at 08:56

## 2021-03-06 RX ADMIN — DOCUSATE SODIUM 100 MG: 100 CAPSULE, LIQUID FILLED ORAL at 08:57

## 2021-03-06 RX ADMIN — Medication 10 ML: at 21:12

## 2021-03-06 RX ADMIN — INSULIN LISPRO 20 UNITS: 100 INJECTION, SOLUTION INTRAVENOUS; SUBCUTANEOUS at 08:57

## 2021-03-06 RX ADMIN — FUROSEMIDE 40 MG: 10 INJECTION, SOLUTION INTRAMUSCULAR; INTRAVENOUS at 17:47

## 2021-03-06 RX ADMIN — Medication 1 SQUIRT: at 09:03

## 2021-03-06 RX ADMIN — SENNOSIDES 8.6 MG: 8.6 TABLET, FILM COATED ORAL at 08:56

## 2021-03-06 RX ADMIN — SENNOSIDES 8.6 MG: 8.6 TABLET, FILM COATED ORAL at 21:11

## 2021-03-06 RX ADMIN — INSULIN LISPRO 20 UNITS: 100 INJECTION, SOLUTION INTRAVENOUS; SUBCUTANEOUS at 17:49

## 2021-03-06 RX ADMIN — MAGNESIUM OXIDE TAB 400 MG (241.3 MG ELEMENTAL MG) 600 MG: 400 (241.3 MG) TAB at 21:12

## 2021-03-06 RX ADMIN — APIXABAN 5 MG: 5 TABLET, FILM COATED ORAL at 08:56

## 2021-03-06 RX ADMIN — INSULIN LISPRO 2 UNITS: 100 INJECTION, SOLUTION INTRAVENOUS; SUBCUTANEOUS at 21:16

## 2021-03-06 RX ADMIN — INSULIN LISPRO 6 UNITS: 100 INJECTION, SOLUTION INTRAVENOUS; SUBCUTANEOUS at 12:57

## 2021-03-06 ASSESSMENT — PAIN SCALES - GENERAL
PAINLEVEL_OUTOF10: 0
PAINLEVEL_OUTOF10: 0

## 2021-03-06 NOTE — PLAN OF CARE
Bed alarm on & in place. 2/4 side rails up. Bed locked & in lowest position. Call light within reach. Bedside table in reach. Checks provided in advance of need. Pt demonstrated understanding to call out for assistance.

## 2021-03-06 NOTE — PROGRESS NOTES
CARDIOLOGY PROGRESS NOTE      Patient Name: Shayan Kraft  Date of admission: 3/5/2021  4:35 AM  Admission Dx: Unstable angina ([address] [I20.0]  Reason for Consult:  Unstable angina  Requesting Physician: Yuliet Robbins MD  Primary Care physician: Lenka Alonzo MD    Subjective:     Shayan Kraft is a 50 y.o. patient with prior history notable for coronary disease with prior PCI LM/LAD 2017, Cardiomyopathy with severe LV dysfunction with recovery of EF to 30-35%, prior CVA, aphasia, paraplegia, diabetes, hyperlipidemia, hypertension, who presented to the hospital 3/5/21 with complaints of fatigue, slurred speech, and atypical chest pain. Found to have elevated troponins. Cardiology consulted for further evaluation. Seen by my partner Dr. Maco Sherwood yesterday. CXR was negative. Elevated pro-BNP and troponin. EKG showed NSR with prior inferior and anteroseptal infarcts. CT PE protocol performed in interim showed no PE to segmental arteries but motion limitation with the scan. Possible atelectasis versus developing PNA. No edema. Today, notes no recurrence of atypical sharp pains on either side of his 'pecs'. Thinks this was related to pulling himself up/re-positioning in bed so often due to dyspnea on lying flat. He endorses orthopnea/PND/abdominal bloating recently. Normally weighs around 250lbs he states (bed weight). Has hospital bed at home -sleeps with [facility] elevated chronically. Following with Cardiology [facility]. Last seen 7/2020. On aspirin, eliquis (?indication). Previously intolerance of statins, on alirocumab for cholesterol management. Maintained on torsemide for diuretic. Documented as tolerating BB/ACEI though no ACEI per home meds or prescribed at present. Patient states this was held due to episode of dehydration previously in January. He's not had this medication since that time.      Home Medications:  Were reviewed and are listed in nursing record and/or below  Prior to Admission medications    Medication Sig Start Date End Date Taking? Authorizing Provider   vitamin D (ERGOCALCIFEROL) 1.25 MG (97068 UT) CAPS capsule Take 1 capsule by mouth once a week 1/28/21  Yes Manny Taylor MD   magnesium oxide (MAG-OX) 400 (241.3 Mg) MG TABS tablet TAKE 3 TABLETS BY MOUTH EVERY MORNING AND TAKE 3 TABLETS BY MOUTH EVERY EVENING 1/27/21  Yes Lalo Hodge MD   pantoprazole (PROTONIX) 40 MG tablet TAKE ONE TABLET BY MOUTH DAILY 1/26/21  Yes Manny Taylor MD   metoprolol succinate (TOPROL XL) 100 MG extended release tablet TAKE ONE TABLET BY MOUTH DAILY 1/26/21  Yes Manny Taylor MD   metFORMIN (GLUCOPHAGE) 1000 MG tablet Take 1 tablet by mouth 2 times daily (with meals) 1/26/21  Yes Manny Taylor MD   torsemide (DEMADEX) 20 MG tablet TAKE TWO TABLETS BY MOUTH DAILY  Patient taking differently: TAKE TWO TABLETS BY MOUTH DAILY at bedtime 1/26/21  Yes Manny Taylor MD   traZODone (DESYREL) 50 MG tablet TAKE ONE TABLET BY MOUTH ONCE NIGHTLY 1/26/21  Yes Manny Taylor MD   apixaban (ELIQUIS) 5 MG TABS tablet TAKE ONE TABLET BY MOUTH TWICE A DAY 1/26/21  Yes Manny Taylor MD   insulin lispro (HUMALOG) 100 UNIT/ML injection vial INJECT 22 UNITS UNDER THE SKIN THREE TIMES A DAY BEFORE MEALS WITH SLIDING SCALE  Patient taking differently: INJECT 20 UNITS UNDER THE SKIN THREE TIMES A DAY BEFORE MEALS WITH SLIDING SCALE 1/26/21  Yes Manny Taylor MD   insulin glargine (LANTUS) 100 UNIT/ML injection vial INJECT 55 UNITS UNDER THE SKIN TWO TIMES A DAY  Patient taking differently: INJECT 60 UNITS UNDER THE SKIN TWO TIMES A DAY 1/26/21  Yes Manny Taylor MD   promethazine (PHENERGAN) 25 MG tablet Take 1 tablet by mouth every 6 hours as needed for Nausea 1/26/21  Yes Manny Taylor MD   polyethylene glycol (MIRALAX) 17 GM/SCOOP powder Take 1 scoop daily. Patient taking differently: as needed Take 1 scoop daily.  9/4/20  Yes Emery Elizabeth Briones MD   senna (SENNA-LAX) 8.6 MG tablet TAKE TWO TABLETS BY MOUTH DAILY 8/28/20  Yes Zenaida Segal MD   docusate sodium (STOOL SOFTENER) 100 MG capsule TAKE TWO CAPSULES BY MOUTH DAILY 8/28/20  Yes Zenaida Segal MD   Alirocumab (PRALUENT) 150 MG/ML SOAJ Inject 150 mg into the skin every 14 days 7/21/20  Yes Historical Provider, MD   ferrous sulfate (IRON 325) 325 (65 Fe) MG tablet TAKE ONE TABLET BY MOUTH DAILY WITH BREAKFAST 5/15/20  Yes Zenaida Segal MD   nitroGLYCERIN (NITROSTAT) 0.4 MG SL tablet up to max of 3 total doses. If no relief after 1 dose, call 911. 5/15/20  Yes Zenaida Segal MD   Insulin Syringe-Needle U-100 ([employer] INSULIN SYRINGE) 31G X 5/16\" 0.5 ML MISC Use 4 times daily. DX: E11.9 1/30/20  Yes Zenaida Segal MD   Insulin Pen Needle ([employer] PEN NEEDLES 31G) 31G X 8 MM MISC Use with Humalog. DX:E11.9 12/17/19  Yes Zenaida Segal MD   cetirizine (ZYRTEC) 10 MG tablet TAKE ONE TABLET BY MOUTH DAILY 12/11/19  Yes Zenaida Segal MD   nystatin (MYCOSTATIN) 682095 UNIT/GM powder Mix with your zinc oxide paste, apply to groin rash 3 times daily as needed. 10/10/19  Yes Danial Jackson MD   blood glucose test strips (ONETOUCH VERIO) strip Use to test five times daily. DX:E11.9 10/1/19  Yes Zenaida Segal MD   aspirin 81 MG tablet Take 81 mg by mouth nightly  10/16/17  Yes Historical Provider, MD   cyclobenzaprine (FLEXERIL) 10 MG tablet Take 1 tablet by mouth 2 times daily as needed for Muscle spasms 1/19/17  Yes Genevieve Camejo MD   nystatin (MYCOSTATIN) 568822 UNIT/ML suspension Take 5 mLs by mouth 4 times daily -- retain in mouth as long as possible before swallowing.  11/4/19   Danial Jackson MD        CURRENT Medications:  apixaban (ELIQUIS) tablet 5 mg, BID  aspirin EC tablet 81 mg, Nightly  cyclobenzaprine (FLEXERIL) tablet 10 mg, BID PRN  metoprolol succinate (TOPROL XL) extended release tablet 100 mg, Daily  pantoprazole (PROTONIX) tablet 40 mg, Daily  traZODone (DESYREL) tablet 50 mg, Nightly PRN  glucose (GLUTOSE) 40 % oral gel 15 g, PRN  dextrose 50 % IV solution, PRN  glucagon (rDNA) injection 1 mg, PRN  dextrose 5 % solution, PRN  sodium chloride flush 0.9 % injection 10 mL, PRN  potassium chloride (KLOR-CON M) extended release tablet 40 mEq, PRN    Or  potassium bicarb-citric acid (EFFER-K) effervescent tablet 40 mEq, PRN    Or  potassium chloride 10 mEq/100 mL IVPB (Peripheral Line), PRN  magnesium sulfate 1000 mg in dextrose 5% 100 mL IVPB, PRN  acetaminophen (TYLENOL) tablet 650 mg, Q6H PRN    Or  acetaminophen (TYLENOL) suppository 650 mg, Q6H PRN  polyethylene glycol (GLYCOLAX) packet 17 g, Daily PRN  nitroGLYCERIN (NITROSTAT) SL tablet 0.4 mg, Q5 Min PRN  promethazine (PHENERGAN) injection 12.5 mg, Q4H PRN  promethazine (PHENERGAN) tablet 12.5 mg, Q4H PRN  zinc oxide (TRIAD HYDROPHILIC) paste 1 Squirt, Daily  insulin glargine (LANTUS) injection vial 60 Units, BID  insulin lispro (HUMALOG) injection vial 20 Units, TID WC  insulin lispro (HUMALOG) injection vial 0-18 Units, TID WC  insulin lispro (HUMALOG) injection vial 0-9 Units, Nightly  menthol-zinc oxide (CALMOSEPTINE) 0.44-20.6 % ointment, Daily  perflutren lipid microspheres (DEFINITY) injection 1.65 mg, ONCE PRN  furosemide (LASIX) injection 40 mg, BID  magnesium oxide (MAG-OX) tablet 600 mg, BID  senna (SENOKOT) tablet 8.6 mg, BID  docusate sodium (COLACE) capsule 100 mg, BID        Allergies:  Benadryl [diphenhydramine hcl], Statins [statins], Cephalexin, Morphine, Penicillins, and Sulfa antibiotics     Review of Systems:   A 14 point review of symptoms completed. Pertinent positives identified in the HPI, all other review of symptoms negative.        Objective:     Vitals:    03/06/21 0647 03/06/21 0900 03/06/21 1215 03/06/21 1615   BP:  128/76 (!) 143/84 118/77   Pulse:  96 85 91   Resp:  16 16 18   Temp:  97.5 °F (36.4 °C) 98.1 °F (36.7 °C) 98.1 °F (36.7 °C)   TempSrc: Oral Oral Oral   SpO2:  93% 93% 99%   Weight: 247 lb 5.7 oz (112.2 kg)      Height:          Weight: 247 lb 5.7 oz (112.2 kg)       PHYSICAL EXAM:    General:  Alert, cooperative, no distress, appears stated age, comfortable appearing   Head:  Normocephalic, atraumatic   Eyes:  Conjunctiva/corneas clear, anicteric sclerae    Nose: Nares normal, no drainage or sinus tenderness   Throat: No abnormalities of the lips, oral mucosa or tongue. Neck: Trachea midline. Neck supple with no lymphadenopathy, thyroid not enlarged, symmetric, no tenderness/mass/nodules, No significant JVP elevation   Lungs:   Clear to auscultation bilaterally, no wheezes, no rales, no respiratory distress   Chest Wall:  No deformity or tenderness to palpation   Heart:  Regular rate and rhythm, normal S1, normal S2, no murmur, no rub, no S3/S4, PMI non-palpable   Abdomen:   Obese, Soft, non-tender, with normoactive bowel sounds. No masses, no hepatosplenomegaly. Ileostomy noted. Extremities: No cyanosis, clubbing or pitting edema. Presacral edema is present. Vascular: 2+ radial, dorsalis pedis and posterior tibial pulses bilaterally. Brisk carotid upstrokes without carotid bruit. Skin: Skin color, texture, turgor are normal with no rashes or ulceration. Pysch: Euthymic mood, appropriate affect   Neurologic: Oriented to person, place and time. No slurred speech or facial asymmetry. No motor or sensory deficits on gross examination.          Labs:   CBC:   Lab Results   Component Value Date    WBC 10.0 03/06/2021    RBC 4.79 03/06/2021    HGB 11.8 03/06/2021    HCT 37.6 03/06/2021    MCV 78.5 03/06/2021    RDW 17.9 03/06/2021     03/06/2021     CMP:  Lab Results   Component Value Date     03/06/2021    K 3.5 03/06/2021    K 4.1 03/04/2021    CL 94 03/06/2021    CO2 30 03/06/2021    BUN 42 03/06/2021    CREATININE 1.1 03/06/2021    GFRAA >60 03/06/2021    GFRAA >60 05/21/2013    AGRATIO 0.9 03/04/2021    LABGLOM >60 03/06/2021    GLUCOSE 127 03/06/2021    PROT 7.5 03/04/2021    PROT 8.0 10/04/2011    CALCIUM 8.9 03/06/2021    BILITOT 0.3 03/04/2021    ALKPHOS 136 03/04/2021    AST 17 03/04/2021    ALT 15 03/04/2021     PT/INR:  No results found for: PTINR  HgBA1c:  Lab Results   Component Value Date    LABA1C 9.2 03/05/2021     Lab Results   Component Value Date    CKTOTAL 29 (L) 08/20/2018    CKMB <0.2 09/01/2010    TROPONINI 0.10 (H) 03/05/2021         Interval Testing/Data:     Telemetry reviewed, no events. Echo 5/14/2019  Summary   Technically difficult examination due to poor acoustical windows. Definity®   used for myocardial border enhancement. Left ventricular function is difficult to assess due to poor acoustical   window, but is visually improved compared to previous echo on 09/05/2018 (EF   20-25%) and is estimated to be reduced at 30-35%. Mild concentric left ventricular hypertrophy. Regional wall motion is difficult to assess. Grade III diastolic dysfunction with elevated LV filling pressures. Mild bi-atrial enlargement. A bubble study was performed but is not conclusive due to poor   visualization. Systolic pulmonary artery pressure (SPAP) is elevated and estimated at 40   mmHg (right atrial pressure 8 mmHg) consistent with mild pulmonary   hypertension. Irregular heartbeat at times throughout the exam.      [address] 77 10/2017  1. Left main: Distal vessel lesion: There is a 4mm (L), 95%  stenosis. The lesion is a likely culprit for the patient's  clinical presentation and an ACC/AHA type C \"high risk\" lesion  for intervention. The lesion was stented (see 1st lesion  intervention). Following intervention, the lesion has PETTY grade  3 flow (brisk flow). 2. LAD: Proximal vessel lesion: There is a 4mm (L), 95% stenosis. The lesion is a likely culprit for the patient's clinical  presentation and an ACC/AHA type C \"high risk\" lesion for  intervention.  The lesion was stented (see 2nd lesion  intervention). Following intervention, the lesion has PETTY grade  3 flow (brisk flow).     RECOMMENDATIONS: DC impella in lab. Perclose  DC LFA sheath wiht manual pull  ASA/Plavix  CHF regimen. Impression and Plan     Anuradha Patterson is a 50 y.o. patient with prior history notable for coronary disease with prior PCI LM/LAD 2017, Cardiomyopathy with severe LV dysfunction with recovery of EF to 30-35%, prior CVA, aphasia, paraplegia, history of VTE, diabetes, hyperlipidemia, hypertension, who presented to the hospital 3/5/21 with complaints of fatigue, slurred speech, and atypical chest pain. Found to have elevated troponins. Cardiology consulted for further evaluation. Patient presents with multiple complaints and is found to have troponin elevation. Flat trend on serial repeat. CT PE no PE (some motion artifact with the study). His chest pain was atyical and likely musculoskeletal by history. His troponins are likely elevated in setting of volume overload. Subjectively improving with diuresis. Will use labs to guide as volume status is admittedly difficult.        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 ([facility]    Iron deficiency anemia due to chronic blood loss    Lymphedema of both lower extremities    Neurogenic bladder    Neurogenic bowel    Hypergranulation    Pressure injury of right buttock, stage 2 ([facility])    Dehiscence of surgical wound of T-spine    Onychomycosis    Dystrophic nail    Ischemic cardiomyopathy    Gitelman syndrome    LIBORIO on CPAP    Hyperglycemia due to diabetes mellitus (HCC)    Type 2 diabetes mellitus with diabetic peripheral angiopathy without gangrene, with long-term current use of insulin ([facility])    Hyperkalemia    Abscess of chest wall (right inframammary)    Ulcer of right knee, limited to breakdown of skin ([facility]    Open wound of right chest wall    Unstable angina ([facility]       PLAN:  1. Continue IV diuresis; monitor daily renal profile for rising BUN/Bicarb/SCr  2. Continue beta blocker, titrate as able. add low dose entresto in AM and monitor BP's closely  3. Continue conservative management of CAD with aspirin, PCSK-9 as statin intolerant per OSH documentation (atorvastatin, pravastatin and rosuvastatin), BB. No need for repeat ischemic workup. 4. Remains on eliquis for VTE  5. Limited echo to evaluate EF Monday; may warrant ICD discussion based on results and we discussed this briefly today. Follows with [facility]. I will address the patient's cardiac risk factors and adjusted pharmacologic treatment as needed. In addition, I have reinforced the need for patient directed risk factor modification. All questions and concerns were addressed to the patient/family. Alternatives to my treatment were discussed. Thank you for allowing us to participate in the care of Serina Ho. Please call me with any questions [phone].     Yaneli Rosario MD, [facility]  Cardiovascular Disease  [facility] [place]  [phone] [facility]  [phone] [address] [place] [address]  3/6/2021 4:54 PM

## 2021-03-07 LAB
ANION GAP SERPL CALCULATED.3IONS-SCNC: 11 MMOL/L (ref 3–16)
BASOPHILS ABSOLUTE: 0.1 K/UL (ref 0–0.2)
BASOPHILS RELATIVE PERCENT: 1 %
BUN BLDV-MCNC: 45 MG/DL (ref 7–20)
CALCIUM SERPL-MCNC: 8.6 MG/DL (ref 8.3–10.6)
CHLORIDE BLD-SCNC: 94 MMOL/L (ref 99–110)
CO2: 30 MMOL/L (ref 21–32)
CREAT SERPL-MCNC: 1.2 MG/DL (ref 0.9–1.3)
EOSINOPHILS ABSOLUTE: 0.2 K/UL (ref 0–0.6)
EOSINOPHILS RELATIVE PERCENT: 2.2 %
GFR AFRICAN AMERICAN: >60
GFR NON-AFRICAN AMERICAN: >60
GLUCOSE BLD-MCNC: 113 MG/DL (ref 70–99)
GLUCOSE BLD-MCNC: 215 MG/DL (ref 70–99)
GLUCOSE BLD-MCNC: 249 MG/DL (ref 70–99)
GLUCOSE BLD-MCNC: 302 MG/DL (ref 70–99)
GLUCOSE BLD-MCNC: 81 MG/DL (ref 70–99)
GLUCOSE BLD-MCNC: 93 MG/DL (ref 70–99)
HCT VFR BLD CALC: 35.8 % (ref 40.5–52.5)
HEMOGLOBIN: 11.3 G/DL (ref 13.5–17.5)
LYMPHOCYTES ABSOLUTE: 1.1 K/UL (ref 1–5.1)
LYMPHOCYTES RELATIVE PERCENT: 10.7 %
MAGNESIUM: 2.1 MG/DL (ref 1.8–2.4)
MCH RBC QN AUTO: 24.6 PG (ref 26–34)
MCHC RBC AUTO-ENTMCNC: 31.6 G/DL (ref 31–36)
MCV RBC AUTO: 77.7 FL (ref 80–100)
MONOCYTES ABSOLUTE: 1.1 K/UL (ref 0–1.3)
MONOCYTES RELATIVE PERCENT: 9.9 %
NEUTROPHILS ABSOLUTE: 8.1 K/UL (ref 1.7–7.7)
NEUTROPHILS RELATIVE PERCENT: 76.2 %
PDW BLD-RTO: 18 % (ref 12.4–15.4)
PERFORMED ON: ABNORMAL
PERFORMED ON: NORMAL
PLATELET # BLD: 251 K/UL (ref 135–450)
PMV BLD AUTO: 9.1 FL (ref 5–10.5)
POTASSIUM SERPL-SCNC: 3.3 MMOL/L (ref 3.5–5.1)
RBC # BLD: 4.6 M/UL (ref 4.2–5.9)
SODIUM BLD-SCNC: 135 MMOL/L (ref 136–145)
WBC # BLD: 10.6 K/UL (ref 4–11)

## 2021-03-07 PROCEDURE — 6370000000 HC RX 637 (ALT 250 FOR IP): Performed by: NURSE PRACTITIONER

## 2021-03-07 PROCEDURE — 6360000002 HC RX W HCPCS: Performed by: NURSE PRACTITIONER

## 2021-03-07 PROCEDURE — 99233 SBSQ HOSP IP/OBS HIGH 50: CPT | Performed by: NURSE PRACTITIONER

## 2021-03-07 PROCEDURE — 2580000003 HC RX 258: Performed by: INTERNAL MEDICINE

## 2021-03-07 PROCEDURE — 83735 ASSAY OF MAGNESIUM: CPT

## 2021-03-07 PROCEDURE — 1200000000 HC SEMI PRIVATE

## 2021-03-07 PROCEDURE — 6360000002 HC RX W HCPCS: Performed by: INTERNAL MEDICINE

## 2021-03-07 PROCEDURE — 85025 COMPLETE CBC W/AUTO DIFF WBC: CPT

## 2021-03-07 PROCEDURE — 80048 BASIC METABOLIC PNL TOTAL CA: CPT

## 2021-03-07 PROCEDURE — 6370000000 HC RX 637 (ALT 250 FOR IP): Performed by: INTERNAL MEDICINE

## 2021-03-07 RX ORDER — FERROUS SULFATE 325(65) MG
325 TABLET ORAL 2 TIMES DAILY WITH MEALS
Status: DISCONTINUED | OUTPATIENT
Start: 2021-03-07 | End: 2021-03-09 | Stop reason: HOSPADM

## 2021-03-07 RX ORDER — POTASSIUM CHLORIDE 20 MEQ/1
40 TABLET, EXTENDED RELEASE ORAL ONCE
Status: COMPLETED | OUTPATIENT
Start: 2021-03-07 | End: 2021-03-07

## 2021-03-07 RX ADMIN — SACUBITRIL AND VALSARTAN 1 TABLET: 24; 26 TABLET, FILM COATED ORAL at 08:16

## 2021-03-07 RX ADMIN — SENNOSIDES 8.6 MG: 8.6 TABLET, FILM COATED ORAL at 08:17

## 2021-03-07 RX ADMIN — FERROUS SULFATE TAB 325 MG (65 MG ELEMENTAL FE) 325 MG: 325 (65 FE) TAB at 10:44

## 2021-03-07 RX ADMIN — FUROSEMIDE 40 MG: 10 INJECTION, SOLUTION INTRAMUSCULAR; INTRAVENOUS at 17:48

## 2021-03-07 RX ADMIN — MAGNESIUM OXIDE TAB 400 MG (241.3 MG ELEMENTAL MG) 600 MG: 400 (241.3 MG) TAB at 21:30

## 2021-03-07 RX ADMIN — FERROUS SULFATE TAB 325 MG (65 MG ELEMENTAL FE) 325 MG: 325 (65 FE) TAB at 17:48

## 2021-03-07 RX ADMIN — PANTOPRAZOLE SODIUM 40 MG: 40 TABLET, DELAYED RELEASE ORAL at 08:17

## 2021-03-07 RX ADMIN — SENNOSIDES 8.6 MG: 8.6 TABLET, FILM COATED ORAL at 21:30

## 2021-03-07 RX ADMIN — ACETAMINOPHEN 650 MG: 325 TABLET ORAL at 23:03

## 2021-03-07 RX ADMIN — DOCUSATE SODIUM 100 MG: 100 CAPSULE, LIQUID FILLED ORAL at 21:30

## 2021-03-07 RX ADMIN — INSULIN LISPRO 20 UNITS: 100 INJECTION, SOLUTION INTRAVENOUS; SUBCUTANEOUS at 08:21

## 2021-03-07 RX ADMIN — ANORECTAL OINTMENT: 15.7; .44; 24; 20.6 OINTMENT TOPICAL at 08:18

## 2021-03-07 RX ADMIN — TRAZODONE HYDROCHLORIDE 50 MG: 50 TABLET ORAL at 21:30

## 2021-03-07 RX ADMIN — APIXABAN 5 MG: 5 TABLET, FILM COATED ORAL at 08:17

## 2021-03-07 RX ADMIN — ASPIRIN 81 MG: 81 TABLET, COATED ORAL at 21:30

## 2021-03-07 RX ADMIN — INSULIN LISPRO 20 UNITS: 100 INJECTION, SOLUTION INTRAVENOUS; SUBCUTANEOUS at 13:19

## 2021-03-07 RX ADMIN — INSULIN GLARGINE 60 UNITS: 100 INJECTION, SOLUTION SUBCUTANEOUS at 21:30

## 2021-03-07 RX ADMIN — INSULIN LISPRO 6 UNITS: 100 INJECTION, SOLUTION INTRAVENOUS; SUBCUTANEOUS at 17:46

## 2021-03-07 RX ADMIN — PROMETHAZINE HYDROCHLORIDE 12.5 MG: 25 INJECTION INTRAMUSCULAR; INTRAVENOUS at 14:19

## 2021-03-07 RX ADMIN — Medication 10 ML: at 21:31

## 2021-03-07 RX ADMIN — DOCUSATE SODIUM 100 MG: 100 CAPSULE, LIQUID FILLED ORAL at 08:17

## 2021-03-07 RX ADMIN — MAGNESIUM OXIDE TAB 400 MG (241.3 MG ELEMENTAL MG) 600 MG: 400 (241.3 MG) TAB at 08:19

## 2021-03-07 RX ADMIN — APIXABAN 5 MG: 5 TABLET, FILM COATED ORAL at 21:30

## 2021-03-07 RX ADMIN — INSULIN LISPRO 6 UNITS: 100 INJECTION, SOLUTION INTRAVENOUS; SUBCUTANEOUS at 13:18

## 2021-03-07 RX ADMIN — INSULIN LISPRO 6 UNITS: 100 INJECTION, SOLUTION INTRAVENOUS; SUBCUTANEOUS at 21:30

## 2021-03-07 RX ADMIN — METOPROLOL SUCCINATE 100 MG: 25 TABLET, EXTENDED RELEASE ORAL at 08:16

## 2021-03-07 RX ADMIN — FUROSEMIDE 40 MG: 10 INJECTION, SOLUTION INTRAMUSCULAR; INTRAVENOUS at 08:18

## 2021-03-07 RX ADMIN — INSULIN LISPRO 20 UNITS: 100 INJECTION, SOLUTION INTRAVENOUS; SUBCUTANEOUS at 17:46

## 2021-03-07 RX ADMIN — INSULIN GLARGINE 60 UNITS: 100 INJECTION, SOLUTION SUBCUTANEOUS at 08:21

## 2021-03-07 RX ADMIN — POTASSIUM CHLORIDE 40 MEQ: 1500 TABLET, EXTENDED RELEASE ORAL at 13:17

## 2021-03-07 RX ADMIN — SACUBITRIL AND VALSARTAN 1 TABLET: 24; 26 TABLET, FILM COATED ORAL at 21:30

## 2021-03-07 RX ADMIN — Medication 1 SQUIRT: at 08:18

## 2021-03-07 ASSESSMENT — PAIN SCALES - GENERAL
PAINLEVEL_OUTOF10: 0
PAINLEVEL_OUTOF10: 0
PAINLEVEL_OUTOF10: 3
PAINLEVEL_OUTOF10: 4

## 2021-03-07 ASSESSMENT — PAIN DESCRIPTION - PAIN TYPE: TYPE: ACUTE PAIN

## 2021-03-07 ASSESSMENT — ENCOUNTER SYMPTOMS
GASTROINTESTINAL NEGATIVE: 1
RESPIRATORY NEGATIVE: 1
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
## 2021-03-07 NOTE — PROGRESS NOTES
Pt resting in bed no complaints. Had Nausea at lunchtime, relieved by phenergan IV. No complaints since.
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
## 2021-03-07 NOTE — PROGRESS NOTES
Oral BID    aspirin  81 mg Oral Nightly    metoprolol succinate  100 mg Oral Daily    pantoprazole  40 mg Oral Daily    zinc oxide  1 Squirt Topical Daily    insulin glargine  60 Units Subcutaneous BID    insulin lispro  20 Units Subcutaneous TID     insulin lispro  0-18 Units Subcutaneous TID     insulin lispro  0-9 Units Subcutaneous Nightly    menthol-zinc oxide   Topical Daily    furosemide  40 mg Intravenous BID    magnesium oxide  600 mg Oral BID    senna  1 tablet Oral BID    docusate sodium  100 mg Oral BID     PRN Meds: cyclobenzaprine, traZODone, glucose, dextrose, glucagon (rDNA), dextrose, sodium chloride flush, magnesium sulfate, acetaminophen **OR** acetaminophen, polyethylene glycol, nitroGLYCERIN, promethazine, promethazine, perflutren lipid microspheres      Intake/Output Summary (Last 24 hours) at 3/7/2021 1840  Last data filed at 3/7/2021 1720  Gross per 24 hour   Intake --   Output 2425 ml   Net -2425 ml       Physical Exam Performed:    /75   Pulse 77   Temp 97.3 °F (36.3 °C)   Resp 18   Ht 6' 2\" (1.88 m)   Wt 245 lb 13 oz (111.5 kg)   SpO2 95%   BMI 31.56 kg/m²     General appearance: No apparent distress, appears stated age and cooperative. HEENT: Pupils equal, round, and reactive to light. Conjunctivae/corneas clear. Neck: Supple, with full range of motion. No jugular venous distention. Trachea midline. Respiratory:  Normal respiratory effort. Clear to auscultation, bilaterally without Rales/Wheezes/Rhonchi. Cardiovascular: Regular rate and rhythm with normal S1/S2 without murmurs, rubs or gallops. Abdomen: Soft, non-tender, non-distended with normal bowel sounds. Ileostomy. Musculoskeletal: Right BKA. Paraplegic. Skin: Skin color, texture, turgor normal.  No rashes or lesions. Neurologic:  Neurovascularly intact without any focal sensory/motor deficits.  Cranial nerves: II-XII intact, grossly non-focal.  Psychiatric: Alert and oriented, thought content appropriate, normal insight  Capillary Refill: Brisk,< 3 seconds   Peripheral Pulses: +2 palpable, equal bilaterally       Labs:   Recent Labs     03/05/21  1017 03/06/21  0610 03/07/21  0555   WBC 8.9 10.0 10.6   HGB 12.1* 11.8* 11.3*   HCT 38.1* 37.6* 35.8*    260 251     Recent Labs     03/05/21  1017 03/06/21  0610 03/07/21  0555   * 135* 135*   K 3.8 3.5 3.3*   CL 93* 94* 94*   CO2 27 30 30   BUN 44* 42* 45*   CREATININE 0.9 1.1 1.2   CALCIUM 9.1 8.9 8.6     No results for input(s): AST, ALT, BILIDIR, BILITOT, ALKPHOS in the last 72 hours. No results for input(s): INR in the last 72 hours. 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 7XXA]    Paraplegia, complete ([address] [G82.21]    Neurogenic bowel [K59.2]    Neurogenic bladder [N31.9]    CAD S/P percutaneous coronary angioplasty [I25.10, Z98.61]     Chest Pain, Ischemic CM, CAD  -  Prior LVEF by [facility] 3/2019 = 33%.  -  Prior high risk PCI of 95% distal LCA/ostial LCx/Prox LAD lesion in 2017. Two prior LAD stents in 2003-4.  -  Continue ASA, apixaban, statin.  -  ECHO ordered. -  Cardiology consulted.     Paraplegia, Multiple chronic wounds  -  Enterostomy evaluation and assistance requested.     DM2  -  Hold all oral antidiabetic agents. Start s.c. Insulin regimen based on home regimen.       DVT Prophylaxis: Eliquis  Diet: DIET CARB CONTROL;   Dietary Nutrition Supplements: Protein Modular  Code Status: Full Code    PT/OT Eval Status: not indicated    Dispo - pending ECHO findings.     Yvonne Barry, APRN - CNP

## 2021-03-08 LAB
ANION GAP SERPL CALCULATED.3IONS-SCNC: 9 MMOL/L (ref 3–16)
BASOPHILS ABSOLUTE: 0.1 K/UL (ref 0–0.2)
BASOPHILS RELATIVE PERCENT: 1.1 %
BUN BLDV-MCNC: 49 MG/DL (ref 7–20)
CALCIUM SERPL-MCNC: 8.3 MG/DL (ref 8.3–10.6)
CHLORIDE BLD-SCNC: 95 MMOL/L (ref 99–110)
CO2: 30 MMOL/L (ref 21–32)
CREAT SERPL-MCNC: 1.3 MG/DL (ref 0.9–1.3)
EOSINOPHILS ABSOLUTE: 0.3 K/UL (ref 0–0.6)
EOSINOPHILS RELATIVE PERCENT: 2.7 %
GFR AFRICAN AMERICAN: >60
GFR NON-AFRICAN AMERICAN: 58
GLUCOSE BLD-MCNC: 100 MG/DL (ref 70–99)
GLUCOSE BLD-MCNC: 109 MG/DL (ref 70–99)
GLUCOSE BLD-MCNC: 116 MG/DL (ref 70–99)
GLUCOSE BLD-MCNC: 121 MG/DL (ref 70–99)
GLUCOSE BLD-MCNC: 220 MG/DL (ref 70–99)
HCT VFR BLD CALC: 34.8 % (ref 40.5–52.5)
HEMOGLOBIN: 11 G/DL (ref 13.5–17.5)
LV EF: 33 %
LVEF MODALITY: NORMAL
LYMPHOCYTES ABSOLUTE: 1.3 K/UL (ref 1–5.1)
LYMPHOCYTES RELATIVE PERCENT: 12.9 %
MAGNESIUM: 2 MG/DL (ref 1.8–2.4)
MCH RBC QN AUTO: 24.6 PG (ref 26–34)
MCHC RBC AUTO-ENTMCNC: 31.6 G/DL (ref 31–36)
MCV RBC AUTO: 77.8 FL (ref 80–100)
MONOCYTES ABSOLUTE: 1 K/UL (ref 0–1.3)
MONOCYTES RELATIVE PERCENT: 10.3 %
NEUTROPHILS ABSOLUTE: 7.4 K/UL (ref 1.7–7.7)
NEUTROPHILS RELATIVE PERCENT: 73 %
PDW BLD-RTO: 18.4 % (ref 12.4–15.4)
PERFORMED ON: ABNORMAL
PLATELET # BLD: 255 K/UL (ref 135–450)
PMV BLD AUTO: 9.1 FL (ref 5–10.5)
POTASSIUM SERPL-SCNC: 3.6 MMOL/L (ref 3.5–5.1)
RBC # BLD: 4.48 M/UL (ref 4.2–5.9)
SODIUM BLD-SCNC: 134 MMOL/L (ref 136–145)
WBC # BLD: 10.1 K/UL (ref 4–11)

## 2021-03-08 PROCEDURE — 6360000004 HC RX CONTRAST MEDICATION: Performed by: NURSE PRACTITIONER

## 2021-03-08 PROCEDURE — 1200000000 HC SEMI PRIVATE

## 2021-03-08 PROCEDURE — 6370000000 HC RX 637 (ALT 250 FOR IP): Performed by: NURSE PRACTITIONER

## 2021-03-08 PROCEDURE — 93306 TTE W/DOPPLER COMPLETE: CPT

## 2021-03-08 PROCEDURE — 6360000002 HC RX W HCPCS: Performed by: NURSE PRACTITIONER

## 2021-03-08 PROCEDURE — 6370000000 HC RX 637 (ALT 250 FOR IP): Performed by: INTERNAL MEDICINE

## 2021-03-08 PROCEDURE — 99233 SBSQ HOSP IP/OBS HIGH 50: CPT | Performed by: INTERNAL MEDICINE

## 2021-03-08 PROCEDURE — C8929 TTE W OR WO FOL WCON,DOPPLER: HCPCS

## 2021-03-08 PROCEDURE — 85025 COMPLETE CBC W/AUTO DIFF WBC: CPT

## 2021-03-08 PROCEDURE — 2580000003 HC RX 258: Performed by: INTERNAL MEDICINE

## 2021-03-08 PROCEDURE — 80048 BASIC METABOLIC PNL TOTAL CA: CPT

## 2021-03-08 PROCEDURE — 83735 ASSAY OF MAGNESIUM: CPT

## 2021-03-08 RX ORDER — FUROSEMIDE 40 MG/1
40 TABLET ORAL 2 TIMES DAILY
Status: DISCONTINUED | OUTPATIENT
Start: 2021-03-08 | End: 2021-03-09 | Stop reason: HOSPADM

## 2021-03-08 RX ORDER — FUROSEMIDE 40 MG/1
40 TABLET ORAL 2 TIMES DAILY
Qty: 60 TABLET | Refills: 3 | Status: SHIPPED | OUTPATIENT
Start: 2021-03-08 | End: 2021-03-19 | Stop reason: ALTCHOICE

## 2021-03-08 RX ADMIN — MAGNESIUM OXIDE TAB 400 MG (241.3 MG ELEMENTAL MG) 600 MG: 400 (241.3 MG) TAB at 09:18

## 2021-03-08 RX ADMIN — APIXABAN 5 MG: 5 TABLET, FILM COATED ORAL at 09:19

## 2021-03-08 RX ADMIN — FERROUS SULFATE TAB 325 MG (65 MG ELEMENTAL FE) 325 MG: 325 (65 FE) TAB at 09:17

## 2021-03-08 RX ADMIN — INSULIN GLARGINE 60 UNITS: 100 INJECTION, SOLUTION SUBCUTANEOUS at 12:15

## 2021-03-08 RX ADMIN — FUROSEMIDE 40 MG: 40 TABLET ORAL at 18:13

## 2021-03-08 RX ADMIN — INSULIN LISPRO 6 UNITS: 100 INJECTION, SOLUTION INTRAVENOUS; SUBCUTANEOUS at 12:38

## 2021-03-08 RX ADMIN — DOCUSATE SODIUM 100 MG: 100 CAPSULE, LIQUID FILLED ORAL at 09:19

## 2021-03-08 RX ADMIN — MAGNESIUM OXIDE TAB 400 MG (241.3 MG ELEMENTAL MG) 600 MG: 400 (241.3 MG) TAB at 20:45

## 2021-03-08 RX ADMIN — SENNOSIDES 8.6 MG: 8.6 TABLET, FILM COATED ORAL at 09:17

## 2021-03-08 RX ADMIN — SACUBITRIL AND VALSARTAN 1 TABLET: 24; 26 TABLET, FILM COATED ORAL at 20:45

## 2021-03-08 RX ADMIN — FUROSEMIDE 40 MG: 10 INJECTION, SOLUTION INTRAMUSCULAR; INTRAVENOUS at 09:20

## 2021-03-08 RX ADMIN — ANORECTAL OINTMENT: 15.7; .44; 24; 20.6 OINTMENT TOPICAL at 09:32

## 2021-03-08 RX ADMIN — INSULIN LISPRO 20 UNITS: 100 INJECTION, SOLUTION INTRAVENOUS; SUBCUTANEOUS at 09:38

## 2021-03-08 RX ADMIN — Medication 10 ML: at 09:20

## 2021-03-08 RX ADMIN — PERFLUTREN 1.65 MG: 6.52 INJECTION, SUSPENSION INTRAVENOUS at 08:19

## 2021-03-08 RX ADMIN — PANTOPRAZOLE SODIUM 40 MG: 40 TABLET, DELAYED RELEASE ORAL at 09:19

## 2021-03-08 RX ADMIN — FERROUS SULFATE TAB 325 MG (65 MG ELEMENTAL FE) 325 MG: 325 (65 FE) TAB at 18:13

## 2021-03-08 RX ADMIN — Medication 1 SQUIRT: at 09:31

## 2021-03-08 RX ADMIN — SENNOSIDES 8.6 MG: 8.6 TABLET, FILM COATED ORAL at 20:45

## 2021-03-08 RX ADMIN — INSULIN LISPRO 20 UNITS: 100 INJECTION, SOLUTION INTRAVENOUS; SUBCUTANEOUS at 18:20

## 2021-03-08 RX ADMIN — INSULIN GLARGINE 60 UNITS: 100 INJECTION, SOLUTION SUBCUTANEOUS at 20:46

## 2021-03-08 RX ADMIN — ASPIRIN 81 MG: 81 TABLET, COATED ORAL at 20:45

## 2021-03-08 RX ADMIN — DOCUSATE SODIUM 100 MG: 100 CAPSULE, LIQUID FILLED ORAL at 20:45

## 2021-03-08 RX ADMIN — APIXABAN 5 MG: 5 TABLET, FILM COATED ORAL at 20:45

## 2021-03-08 RX ADMIN — INSULIN LISPRO 20 UNITS: 100 INJECTION, SOLUTION INTRAVENOUS; SUBCUTANEOUS at 12:38

## 2021-03-08 ASSESSMENT — PAIN SCALES - GENERAL
PAINLEVEL_OUTOF10: 0
PAINLEVEL_OUTOF10: 0

## 2021-03-08 NOTE — CARE COORDINATION
Call received from [facility] RN/intake. States they ARE NOT ABLE TO ACCEPT PATIENT BACK FOR SERVICES, d/t pt not being skillable anymore and wounds are \"non-healing and chronic\". Intake states pt was, \"Issued a cut letter last Saturday. He is really half-way care. Dr Manisha Roberson from [facility] was sending all information to [employer] last week. \"
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
## 2021-03-09 ENCOUNTER — TELEPHONE (OUTPATIENT)
Dept: INTERNAL MEDICINE CLINIC | Age: 56
End: 2021-03-09

## 2021-03-09 VITALS
RESPIRATION RATE: 16 BRPM | HEART RATE: 85 BPM | HEIGHT: 74 IN | BODY MASS INDEX: 32.4 KG/M2 | DIASTOLIC BLOOD PRESSURE: 65 MMHG | WEIGHT: 252.43 LBS | TEMPERATURE: 97.9 F | OXYGEN SATURATION: 94 % | SYSTOLIC BLOOD PRESSURE: 101 MMHG

## 2021-03-09 LAB
ANION GAP SERPL CALCULATED.3IONS-SCNC: 10 MMOL/L (ref 3–16)
BASOPHILS ABSOLUTE: 0.1 K/UL (ref 0–0.2)
BASOPHILS RELATIVE PERCENT: 1.2 %
BUN BLDV-MCNC: 51 MG/DL (ref 7–20)
CALCIUM SERPL-MCNC: 9 MG/DL (ref 8.3–10.6)
CHLORIDE BLD-SCNC: 93 MMOL/L (ref 99–110)
CO2: 30 MMOL/L (ref 21–32)
CREAT SERPL-MCNC: 1.6 MG/DL (ref 0.9–1.3)
EOSINOPHILS ABSOLUTE: 0.3 K/UL (ref 0–0.6)
EOSINOPHILS RELATIVE PERCENT: 3 %
GFR AFRICAN AMERICAN: 55
GFR NON-AFRICAN AMERICAN: 45
GLUCOSE BLD-MCNC: 227 MG/DL (ref 70–99)
GLUCOSE BLD-MCNC: 78 MG/DL (ref 70–99)
GLUCOSE BLD-MCNC: 84 MG/DL (ref 70–99)
HCT VFR BLD CALC: 35.1 % (ref 40.5–52.5)
HEMOGLOBIN: 11.1 G/DL (ref 13.5–17.5)
LYMPHOCYTES ABSOLUTE: 1.2 K/UL (ref 1–5.1)
LYMPHOCYTES RELATIVE PERCENT: 10.6 %
MAGNESIUM: 2 MG/DL (ref 1.8–2.4)
MCH RBC QN AUTO: 24.8 PG (ref 26–34)
MCHC RBC AUTO-ENTMCNC: 31.8 G/DL (ref 31–36)
MCV RBC AUTO: 78.1 FL (ref 80–100)
MONOCYTES ABSOLUTE: 1 K/UL (ref 0–1.3)
MONOCYTES RELATIVE PERCENT: 9.1 %
NEUTROPHILS ABSOLUTE: 8.4 K/UL (ref 1.7–7.7)
NEUTROPHILS RELATIVE PERCENT: 76.1 %
PDW BLD-RTO: 18.2 % (ref 12.4–15.4)
PERFORMED ON: ABNORMAL
PERFORMED ON: NORMAL
PLATELET # BLD: 236 K/UL (ref 135–450)
PMV BLD AUTO: 8.7 FL (ref 5–10.5)
POTASSIUM SERPL-SCNC: 3.6 MMOL/L (ref 3.5–5.1)
RBC # BLD: 4.49 M/UL (ref 4.2–5.9)
SODIUM BLD-SCNC: 133 MMOL/L (ref 136–145)
WBC # BLD: 11 K/UL (ref 4–11)

## 2021-03-09 PROCEDURE — 6370000000 HC RX 637 (ALT 250 FOR IP): Performed by: NURSE PRACTITIONER

## 2021-03-09 PROCEDURE — 83735 ASSAY OF MAGNESIUM: CPT

## 2021-03-09 PROCEDURE — 6370000000 HC RX 637 (ALT 250 FOR IP): Performed by: INTERNAL MEDICINE

## 2021-03-09 PROCEDURE — 2580000003 HC RX 258: Performed by: INTERNAL MEDICINE

## 2021-03-09 PROCEDURE — 80048 BASIC METABOLIC PNL TOTAL CA: CPT

## 2021-03-09 PROCEDURE — 85025 COMPLETE CBC W/AUTO DIFF WBC: CPT

## 2021-03-09 RX ADMIN — INSULIN LISPRO 20 UNITS: 100 INJECTION, SOLUTION INTRAVENOUS; SUBCUTANEOUS at 13:08

## 2021-03-09 RX ADMIN — METOPROLOL SUCCINATE 100 MG: 25 TABLET, EXTENDED RELEASE ORAL at 08:40

## 2021-03-09 RX ADMIN — SACUBITRIL AND VALSARTAN 1 TABLET: 24; 26 TABLET, FILM COATED ORAL at 08:41

## 2021-03-09 RX ADMIN — SENNOSIDES 8.6 MG: 8.6 TABLET, FILM COATED ORAL at 08:41

## 2021-03-09 RX ADMIN — PANTOPRAZOLE SODIUM 40 MG: 40 TABLET, DELAYED RELEASE ORAL at 08:41

## 2021-03-09 RX ADMIN — INSULIN GLARGINE 60 UNITS: 100 INJECTION, SOLUTION SUBCUTANEOUS at 09:33

## 2021-03-09 RX ADMIN — Medication 10 ML: at 08:41

## 2021-03-09 RX ADMIN — ANORECTAL OINTMENT: 15.7; .44; 24; 20.6 OINTMENT TOPICAL at 10:58

## 2021-03-09 RX ADMIN — ACETAMINOPHEN 650 MG: 325 TABLET ORAL at 00:11

## 2021-03-09 RX ADMIN — MAGNESIUM OXIDE TAB 400 MG (241.3 MG ELEMENTAL MG) 600 MG: 400 (241.3 MG) TAB at 08:40

## 2021-03-09 RX ADMIN — INSULIN LISPRO 6 UNITS: 100 INJECTION, SOLUTION INTRAVENOUS; SUBCUTANEOUS at 13:08

## 2021-03-09 RX ADMIN — Medication 1 SQUIRT: at 10:58

## 2021-03-09 RX ADMIN — FUROSEMIDE 40 MG: 40 TABLET ORAL at 08:41

## 2021-03-09 RX ADMIN — FERROUS SULFATE TAB 325 MG (65 MG ELEMENTAL FE) 325 MG: 325 (65 FE) TAB at 08:41

## 2021-03-09 RX ADMIN — APIXABAN 5 MG: 5 TABLET, FILM COATED ORAL at 08:41

## 2021-03-09 RX ADMIN — DOCUSATE SODIUM 100 MG: 100 CAPSULE, LIQUID FILLED ORAL at 08:41

## 2021-03-09 ASSESSMENT — PAIN SCALES - GENERAL
PAINLEVEL_OUTOF10: 2
PAINLEVEL_OUTOF10: 3

## 2021-03-09 NOTE — PROGRESS NOTES
Pt d/c'd home with [facility]. De-accessed port. Catheter intact. Pt tolerated well. No redness noted at site. Notified CMU and removed tele box. Reviewed d/c instructions, home meds, and  f/u information utilizing teach-back method. Patient verbalized understanding. Medications x2 picked up from outpatient pharmacy and sent with pt. Pt escorted off of unit via stretcher by [facility] transport with all d/c paperwork, medication x2, and all belongings in hand.

## 2021-03-09 NOTE — CARE COORDINATION
CM reviewed chart and noticed pt is still in the hospital. Per nursing note,     Pt states \"I won the appeal with Medicare. And I spoke to Alberta Fire last Tuesday with [facility], who states they WILL see me 7 days a week until my chest wound is healed\" Patient then states he called and text Alberta Fire last night, \"and she got snitty with me. Told me they can and will cut my services\". Pt denies CM making referral to [facility] - states not happy with their services in the past. CM spoke with Standard Kingman at [facility], who states they WILL NOT be able to accept patient for daily wound care, without HAVING SOMEONE TEACHABLE IN THE HOME. NURSE WOULD ONLY COME FOR WEEKLY WOUND MEASUREMENTS.    8:19 AM  VM left for Filemon Vera with [facility] [phone].     8:36 AM  CM called [facility] as soon as phone lines opened at 0830. CM spoke with Alberta Fire in intake, who states she has never spoken with patient. Karol Rausch reviewed pt case and states Mayito is clinical manager - note on 3/5/21, states pt dismissed from services/\"Will not take back\", due to \"needing long term care\".      CRUZITO left  for [facility] to further discuss case ([id number]) Medical Record reviewed.  Referral to High Risk Transition in Care (HRTIC) RN received from LACE Report/ diagnosis/ readmission report.    LACE(10 or more):High            2 LACE Length of Stay    3 LACE Acute Admission    5 LACE Charlson Comorbidity Index Evalution    1 LACE Visits to ED Previous 6 Months        AA (alcohol abuse) [F10.10]  Upper GI bleed [K92.2]    Met with patient in room.  Introduced the Transition of Care RN role.  Patient is agreeable to regular post discharge phone calls to check progress and review discharge instructions/medications/follow-up appointments and self-care plan.  Encouraged patient to call after discharge if any non-emergency questions or concerns arise.  Support at home: Yes  Patient gave writer permission to speak to no family if he is unable to get to the phone.  Contact information provided to patient.  Please call if you have any questions.    Orlando Purdy, RN, MSN  Transitional Care RN  [phone]

## 2021-03-09 NOTE — PROGRESS NOTES
Comprehensive Nutrition Assessment    Type and Reason for Visit:  Reassess    Nutrition Recommendations/Plan:   1. Continue current diet (Carb Control)  2. Continue protein modular once daily   3. Monitor PO intakes and wound healing    Nutrition Assessment:  Follow up: Pt adm d/t SOB, found to have severe wounds 2/2 to paraplegia. Currently taking protein modular once daily to increase protine intake to help promote wound healing. No c/o N/V, diarrhea, or constipation. Pt reports his appetite is about normal and has excellent PO intake. Pt states sometimes he is utilizing protein mod, sometimes not, but knows the benefit of it. Encouraged continued excellent PO intake with pt. Possible discharge 3/9. Continue to monitor wound healing status. Malnutrition Assessment:  Malnutrition Status: At risk for malnutrition (Comment)      Estimated Daily Nutrient Needs:  Energy (kcal):  8955-5445 kcal; Weight Used for Energy Requirements:  Adjusted(75 kg)     Protein (g):  112-150 g; Weight Used for Protein Requirements:  Ideal(1.5-2.0 g/kg)        Fluid (ml/day):   ; Method Used for Fluid Requirements:  1 ml/kcal      Nutrition Related Findings:  Na 133. Non-pitting BLE edema. +BM 3/6. Wounds:  Stage II(Abrasion)       Current Nutrition Therapies:    DIET CARB CONTROL; Dietary Nutrition Supplements: Protein Modular    Anthropometric Measures:  · Height: 6' 2\" (188 cm)  · Current Body Weight: 252 lb (114.3 kg)   · Ideal Body Weight: 190 lbs; % Ideal Body Weight 132.6 %   · BMI: 32.3  · Adjusted Body Weight: 285.8; Amputation, Paraplegia   · Adjusted BMI: 36.6    · BMI Categories: Obese Class 1 (BMI 30.0-34. 9)       Nutrition Diagnosis:   · Increased nutrient needs related to increase demand for energy/nutrients as evidenced by wounds      Nutrition Interventions:   Food and/or Nutrient Delivery:  Continue Current Diet, Continue Oral Nutrition Supplement  Nutrition Education/Counseling:  No recommendation at this time Coordination of Nutrition Care:  Continue to monitor while inpatient    Goals:  Pt will consume greater than 50% of meals and ONS w/o further skin breakdown       Nutrition Monitoring and Evaluation:   Behavioral-Environmental Outcomes:  None Identified   Food/Nutrient Intake Outcomes:  Food and Nutrient Intake, Supplement Intake  Physical Signs/Symptoms Outcomes:  Biochemical Data, Skin, Weight     Discharge Planning:    Continue current diet, Continue Oral Nutrition Supplement     Electronically signed by Solitario Cruz Dietetic Intern on 3/9/21 at 11:02 AM EST    Contact: [phone]

## 2021-03-09 NOTE — PROGRESS NOTES
Patient reports that he has appealed care [facility] decision to cut him due to his chronic wounds and \"won last week so they have to come. \" patient unable to reach contact at care [facility] to confirm that he will get care needed at home if discharged today. voicemail left with cm. Wound below right breast is not chronic. Patient not discharged due to unsafe discharge plan.

## 2021-03-09 NOTE — CARE COORDINATION
CASE MANAGEMENT DISCHARGE SUMMARY      Discharge to: home with [facility]. Precertification completed: [address] Notification (HENS) completed: n/a    New Durable Medical Equipment ordered/agency: n/a    Transportation:    Medical Transport explained to [email or web address]. Pt/family voice no agency preference. Agency used: Denny Reed 12 up time: 1330   Ambulance form completed: Yes    Confirmed discharge plan with:     Patient: yes     Family, name and contact number: pt states he spoke with family multiple times regarding discharge     Facility/Agency, name:  KISHAN/AVS obtained from epic access per Charlie Republic with [facility] agency     RN, name: Pierce    Note: Discharging nurse to complete KISHAN, reconcile AVS, and place final copy with patient's discharge packet. RN to ensure that written prescriptions for  Level II medications are sent with patient as per protocol.     Nestor Larsen RN

## 2021-03-09 NOTE — CARE COORDINATION
[facility] and Edmond Boyer with [facility] called back. States pt is in agreement for services. Shoaib Ruiz attempting to reach Dr Ayad Couch with [address] [place] [address] to see if agency could see patient 3x/week for dressing changes - pending answer at this time. Shoaib Ruiz will call CM back, when more information is known.
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
## 2021-03-09 NOTE — CARE COORDINATION
CM called [facility] to arrange pt  for today (pt's stated preferred agency). Per dispatch, they [facility] not have availability today\" but would let CM know. 9:06 AM  Patient mentioned to CM that he used [facility] in the past and would be okay to resume. CM contacted agency. Unfortunately, they no longer have an [facility] office. Therefore, cannot accept patient. Referral called to [facility] care, Humberto Mcguire, who states she will review case and call CM back. 9:12 AM  Call back received from Anjana Brooks. States they are able to get patient today. \"Just need approval from insurance. CM obtained number for [facility] from patient - [phone]. [facility] and provided pt information, as well as pt preference for [facility]. Agent states she will document preference, but it is NOT guaranteed. 9:29 AM  When reservation complete, agent put CM on hold before providing reservation number - states \"patient is out of rides. Let me put you on hold. \"    9:35 AM  [facility] returned to call. States she cannot schedule transport. Pt \"needs approval from [employer]\". CM will contact insurance agency. 9:42 AM  Humberto Mcguire from [facility] returned call. States they absolutely will NOT do DAILY NURSING visits. MAYBE could do 2x/week. Otherwise, an aide or family member would be taught wound care. Humberto Mcguire offered to contact patient to explain limitations of home care agency, to see if he would be in agreement. Tawanna to call CM back with updates.

## 2021-03-09 NOTE — CARE COORDINATION
CM called [employer] members services back and chose transportation option. Spoke with representative, Susi Bourgeois, who identified herself as a supervisor. \"Hospital discharge was set up and denied. Let me look up why it was denied. \" Susi Bourgeois returned to call and states \"We have ALWAYS denied patient's transports from hospital to home. We only approve his transports to appointments. \"    10:54 AM   CM called and spoke with patient, to update as to above. [facility] transport scheduled for 1330 (first available when CM called) to patient's private residence in [place]. 10:58 AM  CM called and updated patient as to transport eta. Still pending call back from [place] [facility].  Patient states his \"ex-wife, Nilton Webster, is a nurse and can do my dressing changes on the off days that home care is not there\" Pt states he \"does not feel comfortable having his aide do dressing changes because she doesn't see well\"

## 2021-03-09 NOTE — CARE COORDINATION
CM called Laura's number - [phone], to obtain approval for transport. Transferred to transportation dept. 9:58 AM  Representative answered to schedule transport. States \"should be no problem, since the patient is discharging from the hospital.\" But, states she does NOT work for [employer] - states she works for [employer] (Mendez Dong). When CM explained that another [employer] rep denied ability to schedule transport d/t needing prior auth, rep placed CM on hold to ask supervisor for clarification. 10:06 AM  Still on hold, trying to secure transport. 10:10 AM  Representative returned to phone. States patient cannot be scheduled for [facility] transport. \"his plan only covers wheelchair transport\". Representative transferring CM to [facility]. 10:14 AM  Still on hold to speak with [employer] representative to secure transport approval.    10:16 AM  Representative, Todd Suarez, states she cannot schedule or approve transport, but would transfer me to someone who could assist.    10:17 AM  Representative, Albina Rapp, answered. Again, states she is not the correct person to schedule transport and placed me on hold, then transferred to another person. 10:19 AM  Representative, Finesse Sadler, answered and suggested that [facility] contact [facility] to schedule [facility]. CM AGAIN explained that has been explored twice and failed. Pt information provided. As well as hospital NPI number, phone and fax per [facility] request. Bre Sis on hold. 10:27 AM  Finesse Sadler returned to phone. Questioning CM \"What exactly did [employer] say? \" CM explained 2 prior conversations with [facility]. Finesse Sadler requesting \"clinical information be faxed for nurse to review\" Fax # [phone]. Placed on hold again. 10:32 AM  More information provided per [facility] request - specifically ICD-10 code for unstable angina. 10:34 AM  Pending auth # [id number].      When CM questioned if able to contact [facility] back at this time to secure transport, [employer] [facility] stated \"not sure.  That is in the hands of the nurse reviewer\"

## 2021-03-10 ENCOUNTER — CARE COORDINATION (OUTPATIENT)
Dept: CASE MANAGEMENT | Age: 56
End: 2021-03-10

## 2021-03-10 DIAGNOSIS — L02.213 ABSCESS OF CHEST WALL: Primary | ICD-10-CM

## 2021-03-10 PROCEDURE — 1111F DSCHRG MED/CURRENT MED MERGE: CPT | Performed by: INTERNAL MEDICINE

## 2021-03-10 NOTE — CARE COORDINATION
[facility] Transitions Initial Follow Up Call    Call within 2 business days of discharge: Yes    Patient: Juan Encarnacion Patient : 1965   MRN: [id number]  Reason for Admission: CP   Discharge Date: 3/9/21 RARS: Readmission Risk Score: 22      Last Discharge [facility]       Complaint Diagnosis Description Type Department Provider    3/5/21   Admission (Discharged) René Cordova MD           Spoke with: [place]: A     Challenges to be reviewed by the provider   Additional needs identified to be addressed with provider No  none             Method of communication with provider : none    Discussed COVID-19 related testing which was not done at this time. Test results were not done. Patient informed of results, if available? Yes    Advance Care Planning:   Does patient have an Advance Directive:  not on file. Was this a readmission? No  Patient stated reason for admission: CP   Patients top risk factors for readmission: medical condition    Care Transition Nurse (CTN) contacted the patient by telephone to perform post hospital discharge assessment. Verified name and  with patient as identifiers. Provided introduction to self, and explanation of the CTN role. CTN reviewed discharge instructions, medical action plan and red flags with patient who verbalized understanding. Patient given an opportunity to ask questions and does not have any further questions or concerns at this time. Were discharge instructions available to patient? Yes. Reviewed appropriate site of care based on symptoms and resources available to patient including: PCP, Specialist, Home health and When to call 911. The patient agrees to contact the PCP office for questions related to their healthcare. Medication reconciliation was performed with patient, who verbalizes understanding of administration of home medications.  Advised obtaining a 90-day supply of all daily and as-needed medications. Covid Risk Education    Patient has following risk factors of: diabetes. Education provided regarding infection prevention, and signs and symptoms of COVID-19 and when to seek medical attention with patient who verbalized understanding. Discussed exposure protocols and quarantine From CDC: Are you at higher risk for severe illness?   and given an opportunity for questions and concerns. The patient agrees to contact the COVID-19 hotline [phone] or PCP office for questions related to COVID-19. For more information on steps you can take to protect yourself, see CDC's How to Protect Yourself       Discussed follow-up appointments. If no appointment was previously scheduled, appointment scheduling offered: Yes. Is follow up appointment scheduled within 7 days of discharge? Yes  Non-[facility] follow up appointment(s):     Plan for follow-up call in 5-7 days based on severity of symptoms and risk factors. Spoke with patient who reported he is doing well. Patient denied any cp or sob since discharge. Patient reported he is unable to obtain daily weights but does monitor swelling, decrease urination, fatigue, or sob. CTN reviewed all medications with patient who reported he is taking all as prescribed. Patient has apt with PCP on 3/12. Denies any acute needs at present time. Agreeable to f/u calls. Educated on the use of urgent care or physicians 24 hr access line if assistance is needed after hours & that they can always contact their home care provider to request a nurse visit even if it isn't their regularly scheduled day for their nurse to visit. [facility] and spoke with Ursula Guerra who reported she would send message to Tab Villanueva to pull C orders from [facility] and waiting on order from PCP. CTN provided contact information for future needs.           Care Transitions 24 Hour Call    Do you have any ongoing symptoms?: No  Do you have a copy of your discharge instructions?: Yes  Do you have all of your prescriptions and are they filled?: Yes  Have you been contacted by a [address]?: No  Have you scheduled your follow up appointment?: Yes  How are you going to get to your appointment?: Car - family or friend to transport  Were you discharged with any Home Care or Post Acute Services: Yes  Post Acute Services: [address] (Comment: [facility] [place]. )  Patient DME: Wheelchair, Hospital bed, Shower chair, Other  Other Patient DME: prateek lift  Patient Home Equipment: CPAP  Do you have support at home?: Partner/Spouse/SO, Child  Do you feel like you have everything you need to keep you well at home?: Yes  Are you an active caregiver in your home?: Yes  Care Transitions Interventions         Follow Up  Future Appointments   Date Time Provider Alyson Cardona   3/12/2021  9:45 AM DARIAN Sommer - CNP [place] [facility] None   3/12/2021 12:30 PM Colonel Kvng MD [address] [place] [address]       Braydon Perkins RN

## 2021-03-12 ENCOUNTER — HOSPITAL ENCOUNTER (OUTPATIENT)
Dept: WOUND CARE | Age: 56
Discharge: HOME | End: 2021-03-12
Payer: MEDICARE

## 2021-03-12 VITALS
HEART RATE: 92 BPM | RESPIRATION RATE: 18 BRPM | DIASTOLIC BLOOD PRESSURE: 70 MMHG | SYSTOLIC BLOOD PRESSURE: 127 MMHG | TEMPERATURE: 98.2 F

## 2021-03-12 PROCEDURE — 11042 DBRDMT SUBQ TIS 1ST 20SQCM/<: CPT | Performed by: INTERNAL MEDICINE

## 2021-03-12 PROCEDURE — 11042 DBRDMT SUBQ TIS 1ST 20SQCM/<: CPT

## 2021-03-12 PROCEDURE — 97607 NEG PRS WND THR NDME<=50SQCM: CPT

## 2021-03-12 RX ORDER — LIDOCAINE 40 MG/G
CREAM TOPICAL PRN
Status: DISCONTINUED | OUTPATIENT
Start: 2021-03-12 | End: 2021-03-13 | Stop reason: HOSPADM

## 2021-03-15 PROBLEM — L98.492 ULCER OF CHEST WALL WITH FAT LAYER EXPOSED (HCC): Status: ACTIVE | Noted: 2021-02-22

## 2021-03-15 NOTE — PROGRESS NOTES
right knee. Ulcer(s): buttock delicately healed; T-spine with 4 exposed screws, hypergranular tissue, moderate amount seropurulent - bloody drainage; right knee cluster much smaller, , superficial, less inflamed; right chest wall wound still sizeable with some depth and undermining, but more granulation, some fibrin and biofilm, a bit of friability, and a few small areas of fibrotic SQ tissue. Photos also saved in electronic chart.     Today's wound measurements, per RN documentation:  Wound 02/19/21 #64, Right Knee Cluster, Trauma, Partial Thickness, Onset 2/16/21-Wound Length (cm): 8 cm  [REMOVED] Wound 08/16/17 #27, Thoracic spine CLUSTER, dehisced surgical wound, full thickness, onset 8/16/2017-Wound Length (cm): 8.5 cm  Wound 02/19/21 #63, Right Chest Wall (inframammary), Abscess, Full Thickness, Onset 2/16/21-Wound Length (cm): 1 cm  Wound 02/05/21 #62, Right Buttock, Pressure Injury, Stage 2, Onset 1/15/21-Wound Length (cm): 0.1 cm    Wound 02/19/21 #64, Right Knee Cluster, Trauma, Partial Thickness, Onset 2/16/21-Wound Width (cm): 9 cm  [REMOVED] Wound 08/16/17 #27, Thoracic spine CLUSTER, dehisced surgical wound, full thickness, onset 8/16/2017-Wound Width (cm): 5 cm  Wound 02/19/21 #63, Right Chest Wall (inframammary), Abscess, Full Thickness, Onset 2/16/21-Wound Width (cm): 3 cm  Wound 02/05/21 #62, Right Buttock, Pressure Injury, Stage 2, Onset 1/15/21-Wound Width (cm): 0.1 cm    Wound 02/19/21 #64, Right Knee Cluster, Trauma, Partial Thickness, Onset 2/16/21-Wound Depth (cm): 0.1 cm  [REMOVED] Wound 08/16/17 #27, Thoracic spine CLUSTER, dehisced surgical wound, full thickness, onset 8/16/2017-Wound Depth (cm): 0.1 cm  Wound 02/19/21 #63, Right Chest Wall (inframammary), Abscess, Full Thickness, Onset 2/16/21-Wound Depth (cm): 0.8 cm  Wound 02/05/21 #62, Right Buttock, Pressure Injury, Stage 2, Onset 1/15/21-Wound Depth (cm): 0.1 cm    Assessment:     Patient Active Problem List   Diagnosis Code  Mixed hyperlipidemia E78.2    CAD S/P percutaneous coronary angioplasty I25.10, Z98.61    Paraplegia, complete ([facility]) G82.21    Chronic back pain M54.9, G89.29    Arthritis M19.90    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    LIBORIO on CPAP G47.33, Z99.89    Hyperglycemia due to diabetes mellitus ([facility]) E11.65    Type 2 diabetes mellitus with diabetic peripheral angiopathy without gangrene, with long-term current use of insulin ([facility]) E11.51, Z79.4    Hyperkalemia E87.5    Ulcer of chest wall with fat layer exposed, following recent abscess L98.492    Ulcer of right knee, limited to breakdown of skin ([facility] [address] L97.811    Open wound of right chest wall S21.101A    Unstable angina ([facility]) I20.0       Assessment of today's active condition(s): recent right chest wall abscess, acute infection resolved, resulting wound still sizeable but , and I think could heal more quickly with adjunctive therapy beyond simple packing; T-spine wound chronic and care there is palliative; right knee should do well within a couple of weeks; buttock healed. Factors contributing to occurrence and/or persistence of the chronic ulcer include lymphedema, diabetes, chronic pressure, decreased mobility, shear force and obesity. Medical necessity of today's visit is shown by the above documentation. Sharp debridement is indicated today, based upon the exam findings in the wound(s) above. Procedure note:     Consent obtained. Time out performed per [address] policy.     Anesthetic  Anesthetic: 4% Lidocaine Cream becomes excessive, or accumulates around [place]. Leave the knee dressing in place for the week. Calmoseptine to the healed buttock skin PRN (if it looks fragile or moist). Keep up with glucose control and protein intake. Keep focused on offloading as well. No systemic Abx needed right now -- watch for cellulitis, fever, hyperglycemia, etc.     Home treatment: good handwashing before and after any dressing changes. Cleanse wound with saline or soap & water before dressing change. May use Vaseline (petrolatum), Aquaphor, Aveeno, CeraVe, Cetaphil, Eucerin, Lubriderm, etc for dry skin. Dressing type for home: as above for the T-spine TIW; place a new KARLI dressing TIW PRN, if drainage is heavy. Written discharge instructions given to patient. Follow up in 1 week.     Electronically signed by Tiffanie Maurer MD on 3/15/2021 at 7:10 PM.
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
## 2021-03-15 NOTE — PLAN OF CARE
Some improvement noted in wounds this week to chest , knee and buttocks. Patient was admitted recently for hospital stay due to cardiac issues. Patient megantes he now has [facility] in place of [facility]. Will begin KARLI to right chest wound this week. Follow up in wound clinic as ordered next Friday. Discharge instructions reviewed with patient, all questions answered, copy given to patient. Dressings were applied to all wounds per M.D. Instructions at this visit.

## 2021-03-16 ENCOUNTER — OFFICE VISIT (OUTPATIENT)
Dept: INTERNAL MEDICINE CLINIC | Age: 56
End: 2021-03-16

## 2021-03-16 VITALS
BODY MASS INDEX: 32.41 KG/M2 | DIASTOLIC BLOOD PRESSURE: 88 MMHG | HEIGHT: 74 IN | RESPIRATION RATE: 16 BRPM | SYSTOLIC BLOOD PRESSURE: 142 MMHG | TEMPERATURE: 97 F

## 2021-03-16 DIAGNOSIS — I25.5 ISCHEMIC CARDIOMYOPATHY: Primary | ICD-10-CM

## 2021-03-16 DIAGNOSIS — I25.5 ISCHEMIC CARDIOMYOPATHY: ICD-10-CM

## 2021-03-16 LAB
ANION GAP SERPL CALCULATED.3IONS-SCNC: 12 MMOL/L (ref 3–16)
BUN BLDV-MCNC: 44 MG/DL (ref 7–20)
CALCIUM SERPL-MCNC: 8.9 MG/DL (ref 8.3–10.6)
CHLORIDE BLD-SCNC: 94 MMOL/L (ref 99–110)
CO2: 29 MMOL/L (ref 21–32)
CREAT SERPL-MCNC: 1 MG/DL (ref 0.9–1.3)
GFR AFRICAN AMERICAN: >60
GFR NON-AFRICAN AMERICAN: >60
GLUCOSE BLD-MCNC: 117 MG/DL (ref 70–99)
POTASSIUM SERPL-SCNC: 4.7 MMOL/L (ref 3.5–5.1)
SODIUM BLD-SCNC: 135 MMOL/L (ref 136–145)

## 2021-03-16 PROCEDURE — G8417 CALC BMI ABV UP PARAM F/U: HCPCS | Performed by: NURSE PRACTITIONER

## 2021-03-16 PROCEDURE — 1036F TOBACCO NON-USER: CPT | Performed by: NURSE PRACTITIONER

## 2021-03-16 PROCEDURE — 99212 OFFICE O/P EST SF 10 MIN: CPT | Performed by: NURSE PRACTITIONER

## 2021-03-16 PROCEDURE — 3017F COLORECTAL CA SCREEN DOC REV: CPT | Performed by: NURSE PRACTITIONER

## 2021-03-16 PROCEDURE — G8484 FLU IMMUNIZE NO ADMIN: HCPCS | Performed by: NURSE PRACTITIONER

## 2021-03-16 PROCEDURE — 1111F DSCHRG MED/CURRENT MED MERGE: CPT | Performed by: NURSE PRACTITIONER

## 2021-03-16 PROCEDURE — G8428 CUR MEDS NOT DOCUMENT: HCPCS | Performed by: NURSE PRACTITIONER

## 2021-03-16 RX ORDER — BLOOD SUGAR DIAGNOSTIC
1 STRIP MISCELLANEOUS DAILY
Qty: 100 EACH | Refills: 11 | Status: SHIPPED | OUTPATIENT
Start: 2021-03-16 | End: 2021-04-13 | Stop reason: SDUPTHER

## 2021-03-16 ASSESSMENT — ENCOUNTER SYMPTOMS
VOMITING: 0
ABDOMINAL PAIN: 0
COUGH: 0
SHORTNESS OF BREATH: 0
NAUSEA: 0
DIARRHEA: 0

## 2021-03-16 NOTE — PROGRESS NOTES
Post-Discharge Transitional Care Management Services or Hospital Follow Up      Donal Oseguera   YOB: 1965    Date of Office Visit:  3/16/2021  Date of Hospital Admission: 3/5/21  Date of Hospital Discharge: 3/9/21  Readmission Risk Score(high >=14%.  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 ([address]    Iron deficiency anemia due to chronic blood loss    Lymphedema of both lower extremities    Neurogenic bladder    Neurogenic bowel    Hypergranulation    Pressure injury of right buttock, stage 2 ([address]    Dehiscence of surgical wound of T-spine    Onychomycosis    Dystrophic nail    Ischemic cardiomyopathy    Gitelman syndrome    LIBORIO on CPAP    Hyperglycemia due to diabetes mellitus ([address]    Type 2 diabetes mellitus with diabetic peripheral angiopathy without gangrene, with long-term current use of insulin (HCC)    Hyperkalemia    Ulcer of chest wall with fat layer exposed, following recent abscess    Ulcer of right knee, limited to breakdown of skin ([address]    Open wound of right chest wall    Unstable angina (HCC)       Allergies   Allergen Reactions    Benadryl [Diphenhydramine Hcl] Anaphylaxis     Throat swelling    Statins [Statins]     Cephalexin Rash    Morphine Anxiety     Hallucinations     Penicillins Rash    Sulfa Antibiotics Rash       Medications listed as ordered at the time of discharge from hospital   [address] Medication Instructions HENOK: Printed on:03/16/21 2755   Medication Information                      Alirocumab (PRALUENT) 150 MG/ML SOAJ  Inject 150 mg into the skin every 14 days             apixaban (ELIQUIS) 5 MG TABS tablet  TAKE ONE TABLET BY MOUTH TWICE A DAY             aspirin 81 MG tablet  Take 81 mg by mouth nightly              blood glucose test strips (ONETOUCH VERIO) strip  Use to test five times daily. DX:E11.9             cetirizine (ZYRTEC) 10 MG tablet  TAKE ONE TABLET BY MOUTH DAILY             cyclobenzaprine (FLEXERIL) 10 MG tablet  Take 1 tablet by mouth 2 times daily as needed for Muscle spasms             docusate sodium (STOOL SOFTENER) 100 MG capsule  TAKE TWO CAPSULES BY MOUTH DAILY             ferrous sulfate (IRON 325) 325 (65 Fe) MG tablet  TAKE ONE TABLET BY MOUTH DAILY WITH BREAKFAST             furosemide (LASIX) 40 MG tablet  Take 1 tablet by mouth 2 times daily             insulin glargine (LANTUS) 100 UNIT/ML injection vial  INJECT 55 UNITS UNDER THE SKIN TWO TIMES A DAY             insulin lispro (HUMALOG) 100 UNIT/ML injection vial  INJECT 22 UNITS UNDER THE SKIN THREE TIMES A DAY BEFORE MEALS WITH SLIDING SCALE             Insulin Pen Needle ([employer] PEN NEEDLES 31G) 31G X 8 MM MISC  Use with Humalog. DX:E11.9             Insulin Syringe-Needle U-100 ([employer] INSULIN SYRINGE) 31G X 5/16\" 0.5 ML MISC  Use 4 times daily. DX: E11.9             magnesium oxide (MAG-OX) 400 (241.3 Mg) MG TABS tablet  TAKE 3 TABLETS BY MOUTH EVERY MORNING AND TAKE 3 TABLETS BY MOUTH EVERY EVENING             metFORMIN (GLUCOPHAGE) 1000 MG tablet  Take 1 tablet by mouth 2 times daily (with meals)             metoprolol succinate (TOPROL XL) 100 MG extended release tablet  TAKE ONE TABLET BY MOUTH DAILY             nitroGLYCERIN (NITROSTAT) 0.4 MG SL tablet  up to max of 3 total doses.  If no relief after 1 dose, call 911.             nystatin (MYCOSTATIN) 797456 UNIT/GM powder  Mix with your zinc oxide paste, apply to groin rash 3 times daily as needed. nystatin (MYCOSTATIN) 950509 UNIT/ML suspension  Take 5 mLs by mouth 4 times daily -- retain in mouth as long as possible before swallowing. pantoprazole (PROTONIX) 40 MG tablet  TAKE ONE TABLET BY MOUTH DAILY             polyethylene glycol (MIRALAX) 17 GM/SCOOP powder  Take 1 scoop daily. promethazine (PHENERGAN) 25 MG tablet  Take 1 tablet by mouth every 6 hours as needed for Nausea             sacubitril-valsartan (ENTRESTO) 24-26 MG per tablet  Take 1 tablet by mouth 2 times daily             senna (SENNA-LAX) 8.6 MG tablet  TAKE TWO TABLETS BY MOUTH DAILY             traZODone (DESYREL) 50 MG tablet  TAKE ONE TABLET BY MOUTH ONCE NIGHTLY             vitamin D (ERGOCALCIFEROL) 1.25 MG (76237 UT) CAPS capsule  Take 1 capsule by mouth once a week                   Medications marked \"taking\" at this time  No outpatient medications have been marked as taking for the 3/16/21 encounter (Office Visit) with DARIAN Chan CNP. Medications patient taking as of now reconciled against medications ordered at time of hospital discharge: Yes    Chief Complaint   Patient presents with    Follow-Up from Hospital       HPI    Inpatient course: Discharge summary reviewed- see chart. Interval history/Current status: patient was admitted to [facility]. He presented with elevated BP, chest pain and dyspnea. He was admitted to [facility] 3/5-3/9. He states they took 20 lbs of fluid. Doing well since being discharged. He has not taken his morning medications yet this morning. Denies chest pain or shortness of breath. He is compliant with his medications. Review of Systems   Constitutional: Negative for fever. Respiratory: Negative for cough and shortness of breath. Cardiovascular: Negative for chest pain. Gastrointestinal: Negative for abdominal pain, diarrhea, nausea and vomiting.           Vitals:    03/16/21 1024   BP: (!) 142/88   Site: Right stable, will switch to home Torsemide (40 mg daily). - F/w PCP.        Medical Decision Making: richelle Handy [facility]  3/16/2021

## 2021-03-17 ENCOUNTER — CARE COORDINATION (OUTPATIENT)
Dept: CASE MANAGEMENT | Age: 56
End: 2021-03-17

## 2021-03-17 NOTE — CARE COORDINATION
[facility] Transitions Follow Up Call    3/17/2021    Patient: Gabby Andrews  Patient : 1965   MRN: [id number]  Reason for Admission: cardiomyopathy  Discharge Date: 3/9/21 RARS: Readmission Risk Score: 22         Attempted to reach patient via phone for transition call. VM left stating purpose of call along with my contact information requesting a return call. Care Transitions Subsequent and Final Call    Subsequent and Final Calls  Care Transitions Interventions  Other Interventions:            Follow Up  Future Appointments   Date Time Provider Department Center   3/19/2021 10:30 AM MD Millie Arroyo [facility]   3/26/2021 10:30 AM MD Millie Arroyo [facility]   2021 10:30 AM MD Millie Arroyo [facility]   2021 10:30 AM MD Millie Arroyo [facility]   2021 10:30 AM MD Millie Arroyo [facility]   2021 10:30 AM MD Millie Arroyo [facility]       Isidra Chapin RN

## 2021-03-18 ENCOUNTER — CARE COORDINATION (OUTPATIENT)
Dept: CASE MANAGEMENT | Age: 56
End: 2021-03-18

## 2021-03-18 NOTE — TELEPHONE ENCOUNTER
----- Message from [facility] sent at 3/18/2021  3:24 PM EDT -----  Contact: Justin To [phone]  Patient requesting refill on Eliquis. Patient forgot to tell Logan Miranda he needed refills of Eloquist. He has 1 for tonight and then out.  Thank you     Snaptrip

## 2021-03-18 NOTE — CARE COORDINATION
[facility] Transitions Follow Up Call    3/18/2021    Patient: Serina Ho  Patient : 1965   MRN: [id number]  Reason for Admission: cardiomyopathy  Discharge Date: 3/9/21 RARS: Readmission Risk Score: 22         Patient called CTN back after getting voicemail and was required to leave message. CTN called patient back when available and was required to leave a HIPAA approved voicemail with request to call back. Michael Gallo RN   Care Transition Nurse  [phone]    Patient called CTN back and verified . Patient feeling well and denied any CP or SOB since last contact. Patient was seen by NP at PCP office and ordered blood work. Patient confirms that he is taking all medication as directed and confirms medication is in the home. Patient continues to have home care services and doing well with exercise regimen. Patient scheduled for wound care and aware of date/time/location of appts. Denies any acute needs at present time. Agreeable to f/u calls. Educated on the use of urgent care or physicians 24 hr access line if assistance is needed after hours. Care Transitions Subsequent and Final Call    Subsequent and Final Calls  Care Transitions Interventions  Other Interventions:            Follow Up  Future Appointments   Date Time Provider Department Center   3/19/2021 10:30 AM MD Gualberto Delgado   3/26/2021 10:30 AM MD Gualberto Delgado   2021 10:30 AM MD Gualberto Delgado   2021 10:30 AM MD Gualberto Delgado   2021 10:30 AM MD Gualberto Delgado   2021 10:30 AM MD Gualberto Delgado, LIONEL

## 2021-03-19 ENCOUNTER — TELEPHONE (OUTPATIENT)
Dept: INTERNAL MEDICINE CLINIC | Age: 56
End: 2021-03-19

## 2021-03-19 ENCOUNTER — HOSPITAL ENCOUNTER (OUTPATIENT)
Dept: WOUND CARE | Age: 56
Discharge: HOME | End: 2021-03-19
Payer: MEDICARE

## 2021-03-19 VITALS
SYSTOLIC BLOOD PRESSURE: 99 MMHG | TEMPERATURE: 97.7 F | BODY MASS INDEX: 32.41 KG/M2 | HEIGHT: 74 IN | RESPIRATION RATE: 18 BRPM | HEART RATE: 69 BPM | DIASTOLIC BLOOD PRESSURE: 65 MMHG

## 2021-03-19 PROCEDURE — 17250 CHEM CAUT OF GRANLTJ TISSUE: CPT

## 2021-03-19 PROCEDURE — 17250 CHEM CAUT OF GRANLTJ TISSUE: CPT | Performed by: INTERNAL MEDICINE

## 2021-03-19 RX ORDER — TORSEMIDE 10 MG/1
40 TABLET ORAL DAILY
Status: ON HOLD | COMMUNITY
End: 2021-05-08 | Stop reason: SDUPTHER

## 2021-03-19 RX ORDER — LIDOCAINE 40 MG/G
CREAM TOPICAL ONCE
Status: DISCONTINUED | OUTPATIENT
Start: 2021-03-19 | End: 2021-03-20 | Stop reason: HOSPADM

## 2021-03-22 PROBLEM — I20.0 UNSTABLE ANGINA (HCC): Status: RESOLVED | Noted: 2021-03-04 | Resolved: 2021-03-22

## 2021-03-22 PROBLEM — L89.312 PRESSURE INJURY OF RIGHT BUTTOCK, STAGE 2 (HCC): Status: RESOLVED | Noted: 2017-01-25 | Resolved: 2021-03-22

## 2021-03-22 PROBLEM — S21.101A OPEN WOUND OF RIGHT CHEST WALL: Status: RESOLVED | Noted: 2021-03-01 | Resolved: 2021-03-22

## 2021-03-22 NOTE — PROGRESS NOTES
[facility] Progress Note    Kip Ricks     : 1965    DATE OF VISIT:  3/19/2021    Subjective:     Kip Ricks is a 50 y.o. male who has a dehisced surgical ulcer located on the back (midline). Significant symptoms or pertinent wound history since last visit: ongoing moderate to heavy drainage, occasional malodor from the back wound, no spreading redness, no fever, a bit more bleeding again this week. Additional ulcer(s) noted? yes. Buttock delicately healed (just some minor friction abrasions), right knee delicately healed, left great toe re-traumatized this week, and the right chest wall wound is still present. Seems like the KARLI NPWT might have started to help things there, but bloody drainage was pretty significant, KARLI needed to be changed twice during the week, and the drape seems to have really irritated his skin. His current medication list consists of Alirocumab, Insulin Pen Needle, Insulin Syringe-Needle U-100, apixaban, aspirin, blood glucose test strips, cetirizine, cyclobenzaprine, docusate sodium, ferrous sulfate, insulin glargine, insulin lispro, magnesium oxide, metFORMIN, metoprolol succinate, nitroGLYCERIN, nystatin, pantoprazole, polyethylene glycol, promethazine, sacubitril-valsartan, senna, torsemide, traZODone, and vitamin D.     Allergies: Benadryl [diphenhydramine hcl], Statins [statins], Cephalexin, Morphine, Penicillins, and Sulfa antibiotics    Objective:     Vitals:    21 1030   BP: 99/65   Pulse: 69   Resp: 18   Temp: 97.7 °F (36.5 °C)   TempSrc: Oral   Weight: Comment: Unable to obtain   Height: 6' 2\" (1.88 m)     AAOx3, overweight, fatigued, NAD  No cellulitis, angitis, fluctuance  Increased rash along the right side of the chest and flank, part contact papular dermatitis, part folliculitis  Dopplerable left distal pulses, foot warm, mild-mod lymphedema, slow cap refill  Chetna-ulcer skin: indurated and pink at back; indurated, mild erythema and rash at chest; friction changes at buttock; pink and healthy at toe; pink to slightly cristina and a bit of flaking hyperkeratosis at knee. Ulcer(s): T-spine with 4 screws exposed, adjacent hypergranulation at 2-3 of them, with some friability and easy bleeding, moderate drainage (more bloody than purulent this week); chest wall wound almost all red, part granular and part inflamed, a bit of fibrin, still a tunnel, no pus; right knee delicately healed; right buttock delicately healed; left great toe with partial thickness skin loss at site of abrasion there. Photos also saved in electronic chart.     Today's Wound Measurements, per RN documentation:  Wound 02/19/21 #63, Right Chest Wall (inframammary), Abscess, Full Thickness, Onset 2/16/21-Wound Length (cm): 0.3 cm  [REMOVED] Wound 02/19/21 #64, Right Knee Cluster, Trauma, Partial Thickness, Onset 2/16/21-Wound Length (cm): 0 cm  Wound 02/05/21 #62, Right Buttock, Pressure Injury, Stage 2, Onset 1/15/21-Wound Length (cm): 0.1 cm  [REMOVED] Wound 08/16/17 #27, Thoracic spine CLUSTER, dehisced surgical wound, full thickness, onset 8/16/2017-Wound Length (cm): 8 cm  Wound 03/19/21 #65, left great toe, traumatic, partial thickness, onset 3/17/21-Wound Length (cm): 0.7 cm    Wound 02/19/21 #63, Right Chest Wall (inframammary), Abscess, Full Thickness, Onset 2/16/21-Wound Width (cm): 3 cm  [REMOVED] Wound 02/19/21 #64, Right Knee Cluster, Trauma, Partial Thickness, Onset 2/16/21-Wound Width (cm): 0 cm  Wound 02/05/21 #62, Right Buttock, Pressure Injury, Stage 2, Onset 1/15/21-Wound Width (cm): 0.1 cm  [REMOVED] Wound 08/16/17 #27, Thoracic spine CLUSTER, dehisced surgical wound, full thickness, onset 8/16/2017-Wound Width (cm): 5 cm  Wound 03/19/21 #65, left great toe, traumatic, partial thickness, onset 3/17/21-Wound Width (cm): 0.6 cm    Wound 02/19/21 #63, Right Chest Wall (inframammary), Abscess, Full Thickness, Onset 2/16/21-Wound Depth (cm): 0.9 cm  [REMOVED] Wound 02/19/21 #64, Right Knee Cluster, Trauma, Partial Thickness, Onset 2/16/21-Wound Depth (cm): 0 cm  Wound 02/05/21 #62, Right Buttock, Pressure Injury, Stage 2, Onset 1/15/21-Wound Depth (cm): 0.1 cm  [REMOVED] Wound 08/16/17 #27, Thoracic spine CLUSTER, dehisced surgical wound, full thickness, onset 8/16/2017-Wound Depth (cm): 0.1 cm  Wound 03/19/21 #65, left great toe, traumatic, partial thickness, onset 3/17/21-Wound Depth (cm): 0.1 cm    Assessment:     Patient Active Problem List   Diagnosis Code    Mixed hyperlipidemia E78.2    CAD S/P percutaneous coronary angioplasty I25.10, Z98.61    Paraplegia, complete ([facility]) G82.21    Chronic back pain M54.9, G89.29    Arthritis M19.90    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    LIBORIO on CPAP G47.33, Z99.89    Hyperglycemia due to diabetes mellitus ([facility]) E11.65    Type 2 diabetes mellitus with diabetic peripheral angiopathy without gangrene, with long-term current use of insulin ([facility]) E11.51, Z79.4    Hyperkalemia E87.5    Ulcer of chest wall with fat layer exposed, following recent abscess L98.492    Ulcer of right knee, limited to breakdown of skin ([address] L97.811       Assessment of today's active condition(s): DM2, paraplegia, multiple nonhealing ulcers -- T-spine care is palliative, and I think he could benefit from a bit of hypergranulation treatment for a little while now - will need to be vigilant for relapse of soft tissue infection; toe just needs simple local care; buttock needs a bit of protective Riley; knee needs one more week of a protective dressing to let that skin get stronger; chest wound might benefit from additional time with KARLI NPWT, but for a week I think we need to change to simpler dressings, and let his skin calm down. Factors contributing to occurrence and/or persistence of the chronic ulcer include diabetes. Medical necessity of today's visit is shown by the above documentation. Sharp debridement is not indicated today, based upon the exam findings in the wound(s) above. Procedure note:     Consent obtained. Time out performed per [facility]. Anesthetic  Anesthetic: 4% Lidocaine Cream     To encourage better epithelial cell coverage, I did use AgNO3 to chemically cauterize hypergranulation tissue on the back (midline) ulcer(s). This was tolerated well, with no significant pain or skin injury. Discharge plan:     Treatment in the wound care center today, per RN documentation: Wound 02/19/21 #63, Right Chest Wall (inframammary), Abscess, Full Thickness, Onset 2/16/21-Dressing/Treatment: Other (comment)(triad,opticelAg,4x4s,mepilexborder)  [REMOVED] Wound 08/16/17 #27, Thoracic spine CLUSTER, dehisced surgical wound, full thickness, onset 8/16/2017-Dressing/Treatment: Other (comment)(calmoseptine, opticell, mepilex). Simple Neosporin and a dry dressing to the left great toe daily. Calmoseptine to the buttock / ischium PRN. One more week of Xeroform, cast padding and Ace wrap to the knee. No Abx needed right now. Keep up with protein intake, glucose control, offloading. Might resume KARLI next week, with some protective hydrocolloid first.    Home treatment: good handwashing before and after any dressing changes. Cleanse wound with saline or soap & water before dressing change. May use Vaseline (petrolatum), Aquaphor, Aveeno, CeraVe, Cetaphil, Eucerin, Lubriderm, etc for dry skin. Dressing type for home: as above, three times weekly. Written discharge instructions given to patient. Follow up in 1 week.     Electronically signed by Dora Leary MD on 3/22/2021 at 4:19 PM.

## 2021-03-22 NOTE — PLAN OF CARE
Will hold KARLI this week. Patient continues to utilize home health for intermittent dressing changes in the home. Follow up in 1 week in wound clinic as ordered. Discharge instructions reviewed with patient, all questions answered, copy given to patient. Dressings were applied to all wounds per M.D. Instructions at this visit.

## 2021-03-25 ENCOUNTER — HOSPITAL ENCOUNTER (OUTPATIENT)
Dept: GENERAL RADIOLOGY | Age: 56
Discharge: HOME | End: 2021-03-25
Payer: MEDICARE

## 2021-03-25 ENCOUNTER — CARE COORDINATION (OUTPATIENT)
Dept: CASE MANAGEMENT | Age: 56
End: 2021-03-25

## 2021-03-25 DIAGNOSIS — R13.10 DYSPHAGIA, UNSPECIFIED TYPE: ICD-10-CM

## 2021-03-25 PROCEDURE — 74019 RADEX ABDOMEN 2 VIEWS: CPT

## 2021-03-25 NOTE — CARE COORDINATION
[facility] Trans[facility] Follow Up Call    3/25/2021    Patient: González Mckenzie  Patient : 1965   MRN: [id number]  Reason for Admission: ischemic cardiomyopathy  Discharge Date: 3/9/21 RARS: Readmission Risk Score: 22         Spoke with: González Mckenzie    Patient answered call and verified . Patient doing well and getting stronger. Patient continues to go to wound care for chronic wounds, but states that they are looking better. Patient continues to have home care 3xweek and reviewed vitals and updated glucose today 117. Patient taking all medication as directed. Patient happy with his progress and denies any acute needs at present time. Agreeable to f/u calls. Educated on the use of urgent care or physicians 24 hr access line if assistance is needed after hours. Care Transitions Subsequent and Final Call    Subsequent and Final Calls  Do you have any ongoing symptoms?: No  Have your medications changed?: No  Do you have any questions related to your medications?: No  Do you currently have any active services?: Yes  Are you currently active with any services?: Home Health  Do you have any needs or concerns that I can assist you with?: No  Identified Barriers: None  Care Transitions Interventions  Other Interventions:            Follow Up  Future Appointments   Date Time Provider Department Center   3/26/2021 10:30 AM Bere Zurita MD [address] [facility]   2021 10:30 AM Bere Zurita MD [address] [facility]   2021 10:30 AM Bere Zurita MD [address] [facility]   2021 10:30 AM Bere Zurita MD [address] [facility]   2021 10:30 AM Bere Zurita MD [address] [facility]       Teresa Randolph RN

## 2021-03-26 ENCOUNTER — HOSPITAL ENCOUNTER (OUTPATIENT)
Dept: WOUND CARE | Age: 56
Discharge: HOME | End: 2021-03-26
Payer: MEDICARE

## 2021-03-26 VITALS
RESPIRATION RATE: 18 BRPM | HEART RATE: 67 BPM | TEMPERATURE: 97.8 F | DIASTOLIC BLOOD PRESSURE: 70 MMHG | SYSTOLIC BLOOD PRESSURE: 112 MMHG

## 2021-03-26 PROCEDURE — 17250 CHEM CAUT OF GRANLTJ TISSUE: CPT

## 2021-03-26 PROCEDURE — 17250 CHEM CAUT OF GRANLTJ TISSUE: CPT | Performed by: INTERNAL MEDICINE

## 2021-03-26 RX ORDER — LIDOCAINE 40 MG/G
CREAM TOPICAL ONCE
Status: DISCONTINUED | OUTPATIENT
Start: 2021-03-26 | End: 2021-03-27 | Stop reason: HOSPADM

## 2021-03-26 NOTE — PLAN OF CARE
Some changes in wound care regime this week, will stop Triad to chest wound and will go to just a spandagrip to RLE. Patient continuous to have home care for intermittent dressing changes at home. Patient may begin KARLI again next week if skin improves. Discharge instructions reviewed with patient, all questions answered, copy given to patient. Dressings were applied to all wounds per M.D. Instructions at this visit.

## 2021-03-30 NOTE — PROGRESS NOTES
88 [facility] Progress Note    Krystal Meckel     : 1965    DATE OF VISIT:  3/26/2021    Subjective:     Krystal Meckel is a 50 y.o. male who has a dehisced surgical ulcer located on the back (midline). Significant symptoms or pertinent wound history since last visit: feeling ok overall, no F/C/D, no SOB or CP, no N/V, eating well, glucoses doing well. Not out of bed into his chair much at all. Additional ulcer(s) noted? yes. Abscess wound on right chest wall; traumatic wound left great toe; right knee healed, right buttock just some minor friction changes and focal denudation. His current medication list consists of Alirocumab, Insulin Pen Needle, Insulin Syringe-Needle U-100, apixaban, aspirin, blood glucose test strips, cetirizine, cyclobenzaprine, docusate sodium, ferrous sulfate, insulin glargine, insulin lispro, magnesium oxide, metFORMIN, metoprolol succinate, nitroGLYCERIN, nystatin, pantoprazole, polyethylene glycol, promethazine, sacubitril-valsartan, senna, torsemide, traZODone, and vitamin D. Allergies: Benadryl [diphenhydramine hcl], Statins [statins], Cephalexin, Morphine, Penicillins, and Sulfa antibiotics    Objective:     Vitals:    21 1014   BP: 112/70   Pulse: 67   Resp: 18   Temp: 97.8 °F (36.6 °C)   TempSrc: Oral   Weight: Comment: Unable to obtain weight     AAOx3, overweight, NAD  No cellulitis, angitis, fluctuance  Mild-mod LE lymphedema  Dopplerable left pedal pulses, foot cool, cap refill slow (his baseline)  Still a follicular and papular rash over the right chest wall and flank, a bit less impressive than last time  Chetna-ulcer skin: a bit more cristina erythema at the back this week, looks more like a contact reaction to drainage than cellulitis; indurated and pink at chest wall; intact, cool and pink at toe; mild flaking hyperkeratosis at right knee.   Ulcer(s): T-spine with usual exposed hardware, some hypergranulation; right chest wound red, granular, fibrin, a bit of easy bleeding, still a bit of a tunnel medially; right knee delicately healed; left great toe partial thickness, clean, pink. Photos also saved in electronic chart. Today's Wound Measurements, per RN documentation:  Wound 03/19/21 #65, left great toe, traumatic, partial thickness, onset 3/17/21-Wound Length (cm): 0.6 cm  Wound 02/19/21 #63, Right Chest Wall (inframammary), Abscess, Full Thickness, Onset 2/16/21-Wound Length (cm): 0.8 cm  Wound 02/05/21 #62, Right Buttock, Pressure Injury, Stage 2, Onset 1/15/21-Wound Length (cm): 0.1 cm  [REMOVED] Wound 08/16/17 #27, Thoracic spine CLUSTER, dehisced surgical wound, full thickness, onset 8/16/2017-Wound Length (cm): 8 cm    Wound 03/19/21 #65, left great toe, traumatic, partial thickness, onset 3/17/21-Wound Width (cm): 0.9 cm  Wound 02/19/21 #63, Right Chest Wall (inframammary), Abscess, Full Thickness, Onset 2/16/21-Wound Width (cm): 2 cm  Wound 02/05/21 #62, Right Buttock, Pressure Injury, Stage 2, Onset 1/15/21-Wound Width (cm): 0.1 cm  [REMOVED] Wound 08/16/17 #27, Thoracic spine CLUSTER, dehisced surgical wound, full thickness, onset 8/16/2017-Wound Width (cm): 5 cm    Wound 03/19/21 #65, left great toe, traumatic, partial thickness, onset 3/17/21-Wound Depth (cm): 0.1 cm  Wound 02/19/21 #63, Right Chest Wall (inframammary), Abscess, Full Thickness, Onset 2/16/21-Wound Depth (cm): 0.7 cm  Wound 02/05/21 #62, Right Buttock, Pressure Injury, Stage 2, Onset 1/15/21-Wound Depth (cm): 0.1 cm  [REMOVED] Wound 08/16/17 #27, Thoracic spine CLUSTER, dehisced surgical wound, full thickness, onset 8/16/2017-Wound Depth (cm): 0.3 cm    Assessment:     Patient Active Problem List   Diagnosis Code    Mixed hyperlipidemia E78.2    CAD S/P percutaneous coronary angioplasty I25.10, Z98.61    Paraplegia, complete (HCC) G82.21    Chronic back pain M54.9, G89.29    Arthritis M19.90    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    LIBORIO on CPAP G47.33, Z99.89    Hyperglycemia due to diabetes mellitus (HCC) E11.65    Type 2 diabetes mellitus with diabetic peripheral angiopathy without gangrene, with long-term current use of insulin ([facility]) E11.51, Z79.4    Hyperkalemia E87.5    Ulcer of chest wall with fat layer exposed, following recent abscess L98.492    Ulcer of right knee, limited to breakdown of skin ([facility]) L97.811       Assessment of today's active condition(s): DM, paraplegia, chronic T-spine wound with exposed hardware, treating in a palliative manner; right knee doing well, left toe should do well with simple local care; right chest wound after that abscess definitely looking healthier, more granular, infection resolved, but very friable, bleeds easily, and the periwound skin still looks a little rough to try to reapply a KARLI this week. Factors contributing to occurrence and/or persistence of the chronic ulcer include lymphedema and diabetes. Medical necessity of today's visit is shown by the above documentation. Sharp debridement is not indicated today, based upon the exam findings in the wound(s) above. Procedure note:     Consent obtained. Time out performed per [facility] policy. Anesthetic  Anesthetic: 4% Lidocaine Cream     To encourage better epithelial cell coverage, I did use AgNO3 to chemically cauterize hypergranulation tissue on the back (midline) ulcer(s). This was tolerated well, with no significant pain or skin injury.      Discharge plan:     Treatment in the wound care center today, per RN documentation: Wound 03/19/21 #65, left great toe, traumatic, partial thickness, onset 3/17/21-Dressing/Treatment: Other (comment)(antibiotic ointment dry dressing)  Wound 02/19/21 #63, Right Chest Wall (inframammary), Abscess, Full Thickness, Onset 2/16/21-Dressing/Treatment: Other (comment)(calmoseptine silver alginate mepilex border)  Wound 02/05/21 #62, Right Buttock, Pressure Injury, Stage 2, Onset 1/15/21-Dressing/Treatment: Other (comment)(calmoseptine)  [REMOVED] Wound 08/16/17 #27, Thoracic spine CLUSTER, dehisced surgical wound, full thickness, onset 8/16/2017-Dressing/Treatment: Other (comment)(calmoeptine silver alginate mepilex border). No Abx needed at present. Keep working on glucose control, protein intake, offloading. Right knee can just have a length of protective Spandagrip. Home treatment: good handwashing before and after any dressing changes. Cleanse wound with saline or soap & water before dressing change. May use Vaseline (petrolatum), Aquaphor, Aveeno, CeraVe, Cetaphil, Eucerin, Lubriderm, etc for dry skin. Dressing type for home: as above for the toe daily, Calmoseptine to the buttock daily PRN, as above for the back and chest wall, three times weekly. Written discharge instructions given to patient. Follow up in 1 week.     Electronically signed by Dora Leary MD on 3/30/2021 at 9:14 AM.

## 2021-04-01 ENCOUNTER — CARE COORDINATION (OUTPATIENT)
Dept: CASE MANAGEMENT | Age: 56
End: 2021-04-01

## 2021-04-01 ENCOUNTER — TELEPHONE (OUTPATIENT)
Dept: INTERNAL MEDICINE CLINIC | Age: 56
End: 2021-04-01

## 2021-04-01 NOTE — TELEPHONE ENCOUNTER
----- Message from Isaac Page MD sent at 4/1/2021  5:00 PM EDT -----  Contact: Pt- [phone]  If symptoms are new , yes repeat cxr  ----- Message -----  From: Ingrid Nicole  Sent: 4/1/2021   8:22 AM EDT  To: Isaac Page MD    Pt states he just had a cxr last Thursday. Does he need to repeat?  ----- Message -----  From: Isaac Page MD  Sent: 4/1/2021   7:50 AM EDT  To: Anjana Lopez cxr 2 view to see for any lung issues  ----- Message -----  From: Roopa Zelaya  Sent: 3/31/2021   4:43 PM EDT  To: Isaac Page MD    Pt called stating that he is coughing uncontrollably. Pt had his esophagus stretched by Myra Oleary and it helped for a little while. Pt then started coughing again and the doctor told him to ask you for a referral for a pulmonologist because he thinks it is a lung issue. Please advise.       Pt- [phone]

## 2021-04-02 ENCOUNTER — HOSPITAL ENCOUNTER (OUTPATIENT)
Dept: WOUND CARE | Age: 56
Discharge: HOME | End: 2021-04-02
Payer: MEDICARE

## 2021-04-02 VITALS
SYSTOLIC BLOOD PRESSURE: 96 MMHG | HEART RATE: 66 BPM | BODY MASS INDEX: 32.41 KG/M2 | TEMPERATURE: 97 F | RESPIRATION RATE: 18 BRPM | HEIGHT: 74 IN | DIASTOLIC BLOOD PRESSURE: 62 MMHG

## 2021-04-02 PROCEDURE — 97607 NEG PRS WND THR NDME<=50SQCM: CPT | Performed by: INTERNAL MEDICINE

## 2021-04-02 PROCEDURE — 17250 CHEM CAUT OF GRANLTJ TISSUE: CPT | Performed by: INTERNAL MEDICINE

## 2021-04-02 PROCEDURE — 97607 NEG PRS WND THR NDME<=50SQCM: CPT

## 2021-04-02 PROCEDURE — 17250 CHEM CAUT OF GRANLTJ TISSUE: CPT

## 2021-04-02 RX ORDER — LIDOCAINE 40 MG/G
CREAM TOPICAL ONCE
Status: DISCONTINUED | OUTPATIENT
Start: 2021-04-02 | End: 2021-04-03 | Stop reason: HOSPADM

## 2021-04-02 ASSESSMENT — PAIN SCALES - GENERAL: PAINLEVEL_OUTOF10: 0

## 2021-04-05 ENCOUNTER — HOSPITAL ENCOUNTER (OUTPATIENT)
Age: 56
Discharge: HOME | End: 2021-04-05
Payer: MEDICARE

## 2021-04-05 ENCOUNTER — HOSPITAL ENCOUNTER (OUTPATIENT)
Dept: GENERAL RADIOLOGY | Age: 56
Discharge: HOME | End: 2021-04-05
Payer: MEDICARE

## 2021-04-05 DIAGNOSIS — R05.9 COUGH: Primary | ICD-10-CM

## 2021-04-05 DIAGNOSIS — R05.9 COUGH: ICD-10-CM

## 2021-04-05 PROCEDURE — 71046 X-RAY EXAM CHEST 2 VIEWS: CPT

## 2021-04-05 NOTE — PLAN OF CARE
Patient will resume KARLI this week to chest wound. Will use hydrocolloid to protect sensitive alin wound. Patient continues to have home health in place for intermittent wound care at home. Follow up in 1 week in wound clinic as ordered. Discharge instructions reviewed with patient, all questions answered, copy given to patient. Dressings were applied to all wounds per M.D. Instructions at this visit.

## 2021-04-09 ENCOUNTER — CARE COORDINATION (OUTPATIENT)
Dept: CASE MANAGEMENT | Age: 56
End: 2021-04-09

## 2021-04-09 ENCOUNTER — HOSPITAL ENCOUNTER (OUTPATIENT)
Dept: WOUND CARE | Age: 56
Discharge: HOME | End: 2021-04-09
Payer: MEDICARE

## 2021-04-09 VITALS
RESPIRATION RATE: 18 BRPM | HEIGHT: 74 IN | DIASTOLIC BLOOD PRESSURE: 78 MMHG | SYSTOLIC BLOOD PRESSURE: 126 MMHG | HEART RATE: 67 BPM | BODY MASS INDEX: 32.41 KG/M2 | TEMPERATURE: 97.1 F

## 2021-04-09 DIAGNOSIS — T81.31XA POSTOPERATIVE WOUND DEHISCENCE, INITIAL ENCOUNTER: ICD-10-CM

## 2021-04-09 DIAGNOSIS — R05.3 CHRONIC COUGH: ICD-10-CM

## 2021-04-09 DIAGNOSIS — L98.492 ULCER OF CHEST WALL WITH FAT LAYER EXPOSED (HCC): Primary | ICD-10-CM

## 2021-04-09 PROCEDURE — 17250 CHEM CAUT OF GRANLTJ TISSUE: CPT | Performed by: INTERNAL MEDICINE

## 2021-04-09 PROCEDURE — 17250 CHEM CAUT OF GRANLTJ TISSUE: CPT

## 2021-04-09 RX ORDER — METOPROLOL SUCCINATE 100 MG/1
TABLET, EXTENDED RELEASE ORAL
Qty: 90 TABLET | Refills: 0 | Status: ON HOLD
Start: 2021-04-09 | End: 2021-01-01 | Stop reason: HOSPADM

## 2021-04-09 RX ORDER — LIDOCAINE 50 MG/G
OINTMENT TOPICAL ONCE
Status: CANCELLED | OUTPATIENT
Start: 2021-04-09 | End: 2021-04-09

## 2021-04-09 RX ORDER — LIDOCAINE 40 MG/G
CREAM TOPICAL PRN
Status: DISCONTINUED | OUTPATIENT
Start: 2021-04-09 | End: 2021-04-10 | Stop reason: HOSPADM

## 2021-04-09 RX ORDER — LIDOCAINE 50 MG/G
OINTMENT TOPICAL ONCE
Status: DISCONTINUED | OUTPATIENT
Start: 2021-04-09 | End: 2021-04-10 | Stop reason: HOSPADM

## 2021-04-09 RX ORDER — LIDOCAINE 40 MG/G
CREAM TOPICAL ONCE
Status: CANCELLED | OUTPATIENT
Start: 2021-04-09 | End: 2021-04-09

## 2021-04-09 RX ORDER — LIDOCAINE HYDROCHLORIDE 40 MG/ML
SOLUTION TOPICAL ONCE
Status: CANCELLED | OUTPATIENT
Start: 2021-04-09 | End: 2021-04-09

## 2021-04-09 RX ORDER — LIDOCAINE 40 MG/G
CREAM TOPICAL ONCE
Status: DISCONTINUED | OUTPATIENT
Start: 2021-04-09 | End: 2021-04-10 | Stop reason: HOSPADM

## 2021-04-09 RX ORDER — LIDOCAINE HYDROCHLORIDE 40 MG/ML
SOLUTION TOPICAL ONCE
Status: DISCONTINUED | OUTPATIENT
Start: 2021-04-09 | End: 2021-04-10 | Stop reason: HOSPADM

## 2021-04-09 ASSESSMENT — PAIN SCALES - GENERAL: PAINLEVEL_OUTOF10: 0

## 2021-04-09 NOTE — CARE COORDINATION
[facility] Transitions Follow Up Call    2021    Patient: Purnima Phillips  Patient : 1965   MRN: [id number]  Reason for Admission: ischemic cardiomyopathy  Discharge Date: 3/9/21 RARS: Readmission Risk Score: 22         Attempted to reach patient via phone for transition call. No option to leave message. CTN to re-attempt call later in the day. Patient noted to go to wound clinic on . Care Transitions Subsequent and Final Call    Subsequent and Final Calls  Care Transitions Interventions  Other Interventions:            Follow Up  Future Appointments   Date Time Provider Alyson Cardona   2021 10:40 AM [facility] None   2021 10:30 AM MD Mana Cro[facility]   2021 10:30 AM Luh Raymond MD [address] [place] [address] Columba Garrison RN

## 2021-04-09 NOTE — PLAN OF CARE
[address] [facility] Physician Orders and Discharge [address]  [address], [address]  [place], [facility]  Telephone: [phone]      Fax: [phone]        Your home care company:   [facility]     Your wound-care supplies will be provided by:  Home care     NAME:  Jignesh Pop   YOB: 1965  PRIMARY DIAGNOSIS FOR WOUND CARE CENTER:  Pressure Ulcer Stage 2     Wound cleansing:   Do not scrub or use excessive force. Wash hands with soap and water before and after dressing changes. Prior to applying a clean dressing, cleanse wound with normal saline, wound cleanser, or mild soap and water. Ask your physician or nurse before getting the wound(s) wet in the shower. Wound care for home:     Buttocks Wound: HEALED  Calmoseptine as needed to protect     Right Knee: HEALED  Mepitel One -applied by Dr. Sherryle Jasmine leave in place  Cotton [place]    Left Great Toe:  Antibiotic ointment and bandaid or any dry dressing  Change dressing every day    Right Thigh Blister:  Procellera to wound, Benzoin to alin wound and large Mepilex Border  Leave this dressing in place all week     Right Chest wall Wound:  Calmoseptine to skin irritation  Silver Alginate tucked in to the wound  Cover with an absorptive dressing  Change 3 x week    Left Chest Wall:  A couple of times a day apply a warm compression   Then you can apply a dot of antibiotic ointment and bandaid or dressing     Chronic Open Back Wound: Dr. Sherryle Jasmine used Silver Nitrate on your wound today.   The wound will be black or silver in appearance  for the next several days, this is normal.   Spray Hypochlorous Acid into wound let sit, do  Not rinse  Calmoseptine to alin wound  Silver Alginate for drainage as needed (we are applying today)  Cover with a Mepilex Border   Change 3 x week             Please note, all wounds (unless stated otherwise here) were mechanically debrided at the time of [phone] Monday-Thursday from 8:00 am - 4:30 pm, or Friday from 8:00 am - 2:30 pm.  If you need help with your wound outside these hours and cannot wait until we are again available, contact your home-care company (if applicable), your PCP, or go to the nearest emergency room

## 2021-04-12 NOTE — PROGRESS NOTES
88 [facility] Progress Note    Annette Beaulieu     : 1965    DATE OF VISIT:  2021    Subjective:     Annette Beaulieu is a 50 y.o. male who has an infection-associated ulcer located on the chest (on the lower right). Significant symptoms or pertinent wound history since last visit: feeling ok overall, still having some bloody drainage from that chest wound, no recent pus or malodor, no fever or chills. Skin starting to calm down there. Additional ulcer(s) noted? yes. T-spine surgical dehiscence, and the left great toe wound. Also small area of right buttock shear and pressure still open. His current medication list consists of Alirocumab, Insulin Pen Needle, Insulin Syringe-Needle U-100, apixaban, aspirin, blood glucose test strips, cetirizine, cyclobenzaprine, docusate sodium, ferrous sulfate, insulin glargine, insulin lispro, magnesium oxide, metFORMIN, metoprolol succinate, nitroGLYCERIN, nystatin, pantoprazole, polyethylene glycol, promethazine, senna, torsemide, traZODone, and vitamin D. Allergies: Benadryl [diphenhydramine hcl], Statins [statins], Cephalexin, Morphine, Penicillins, and Sulfa antibiotics    Objective:     Vitals:    21 1012 21 1015   BP:  96/62  Comment: pt asymptomatic   Pulse:  66   Resp: 18    Temp:  97 °F (36.1 °C)   TempSrc: Oral    Weight: Comment: Unable to Obtain    Height: 6' 2\" (1.88 m)      AAOx3, overweight, NAD  Dopplerable left distal pulses, foot a bit cool, slow cap refill (stable for him)  Mild-mod BL LE lymphedema  No cellulitis, angitis, fluctuance  Still a bit of a papular and follicular rash on chest and flanks  Chetna-ulcer skin: indurated and pink to light red, some friction changes at buttock.   Ulcer(s): T-spine with 4 screws exposed, some exposed hypergranulation, moderately heavy drainage, cloudy-bloody; right chest wall wound red, granular, somewhat friable, still a bit of undermining, no purulence; buttock partial thickness, looks more like an issue of friction and shear as oppoosed to direct pressure; left great toe ulcer small, slowly smaller, red, granular. Photos also saved in electronic chart.     Today's Wound Measurements, per RN documentation:  Wound 03/19/21 #65, left great toe, traumatic, partial thickness, onset 3/17/21-Wound Length (cm): 0.6 cm  Wound 02/19/21 #63, Right Chest Wall (inframammary), Abscess, Full Thickness, Onset 2/16/21-Wound Length (cm): 0.6 cm  Wound 02/05/21 #62, Right Buttock, Pressure Injury, Stage 2, Onset 1/15/21-Wound Length (cm): 0.8 cm  [REMOVED] Wound 08/16/17 #27, Thoracic spine CLUSTER, dehisced surgical wound, full thickness, onset 8/16/2017-Wound Length (cm): 8 cm    Wound 03/19/21 #65, left great toe, traumatic, partial thickness, onset 3/17/21-Wound Width (cm): 0.9 cm  Wound 02/19/21 #63, Right Chest Wall (inframammary), Abscess, Full Thickness, Onset 2/16/21-Wound Width (cm): 3 cm  Wound 02/05/21 #62, Right Buttock, Pressure Injury, Stage 2, Onset 1/15/21-Wound Width (cm): 0.3 cm  [REMOVED] Wound 08/16/17 #27, Thoracic spine CLUSTER, dehisced surgical wound, full thickness, onset 8/16/2017-Wound Width (cm): 5 cm    Wound 03/19/21 #65, left great toe, traumatic, partial thickness, onset 3/17/21-Wound Depth (cm): 0.1 cm  Wound 02/19/21 #63, Right Chest Wall (inframammary), Abscess, Full Thickness, Onset 2/16/21-Wound Depth (cm): 0.5 cm  Wound 02/05/21 #62, Right Buttock, Pressure Injury, Stage 2, Onset 1/15/21-Wound Depth (cm): 0.1 cm  [REMOVED] Wound 08/16/17 #27, Thoracic spine CLUSTER, dehisced surgical wound, full thickness, onset 8/16/2017-Wound Depth (cm): 0.3 cm    Assessment:     Patient Active Problem List   Diagnosis Code    Mixed hyperlipidemia E78.2    CAD S/P percutaneous coronary angioplasty I25.10, Z98.61    Paraplegia, complete (HCC) G82.21    Chronic back pain M54.9, G89.29    Arthritis M19.90    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    LIBORIO on CPAP G47.33, Z99.89    Hyperglycemia due to diabetes mellitus (HCC) E11.65    Type 2 diabetes mellitus with diabetic peripheral angiopathy without gangrene, with long-term current use of insulin (HCC) E11.51, Z79.4    Hyperkalemia E87.5    Ulcer of chest wall with fat layer exposed, following recent abscess L98.492    Ulcer of right knee, limited to breakdown of skin ([facility] [address] L97.811       Assessment of today's active condition(s): DM, paraplegia, multiple nonhealing wounds -- T-spine care is palliative, given hardware exposure and no real expectations of surgery; buttock is strictly an issue of offloading; great toe was traumatic, healing slowly because of lymphedema, DM and PAD; chest wound from a recent abscess is healing slowly, I think would do better with resumption of KARLI NPWT, if we can apply it without further irritating his skin. Factors contributing to occurrence and/or persistence of the chronic ulcer include lymphedema, diabetes, chronic pressure, decreased mobility, shear force and obesity. Medical necessity of today's visit is shown by the above documentation. Sharp debridement is not indicated today, based upon the exam findings in the wound(s) above. Procedure note:     Consent obtained. Time out performed per [facility] policy. Anesthetic  Anesthetic: 4% Lidocaine Cream     To encourage better epithelial cell coverage, I did use AgNO3 to chemically cauterize hypergranulation tissue on the back (midline) ulcer(s). This was tolerated well, with no significant pain or skin injury.      Discharge plan:     Treatment in the wound care center today, per RN documentation: Wound 03/19/21 #65, left great toe, traumatic, partial thickness, onset 3/17/21-Dressing/Treatment: Other (comment)(pso, dressing )  Wound 02/19/21 #63, Right Chest Wall (inframammary), Abscess, Full Thickness, Onset 2/16/21-Dressing/Treatment: (vashe, periwound duoderm for protection, collagen)  Wound 02/05/21 #62, Right Buttock, Pressure Injury, Stage 2, Onset 1/15/21-Dressing/Treatment: (calmoseptine). For the T-spine, his usual silver alginate, Calmoseptine, Mepilex border. To try to improve granulation tissue, encourage the right chest ulcer to contract, and/or to manage the higher-than usual level of exudate, we elected to apply a disposable NPWT system today (KARLI). Wound was cleansed with saline after debridement; skin prep applied to periwound skin; first dressing layer(s) were applied as above, then a 20 x 20 cm KARLI dressing was applied, with the Port in the superior position, and secured with adhesive strips. Pump turned on, functioning well, no leaks. Patient educated to keep dressing dry, watch occasionally for alarm lights, and note if drainage becomes excessive, or accumulates around [address]. Anticipate that the KARLI dressing might need to be changed 1-2x per week, at his level of drainage. Keep focused on glucose control, protein intake, offloading (most time in bed, flat, turning side to side; sitting up at times for meals, etc, as needed; minimal time in his chair -- all to encourage the buttock to heal again, and more substantially). Home treatment: good handwashing before and after any dressing changes. Cleanse wound with saline or soap & water before dressing change. May use Vaseline (petrolatum), Aquaphor, Aveeno, CeraVe, Cetaphil, Eucerin, Lubriderm, etc for dry skin. Dressing type for home: as above, PRN for the buttock, daily for the toe, TIW for the T-spine. Written discharge instructions given to patient.  Follow up in 1

## 2021-04-12 NOTE — PLAN OF CARE
Will hold KARLI this week due to skin irritation from dressing. Reminded patient on Discharge instructions reviewed with patient, all questions answered, copy given to patient. Reminded patient he has to keep offloading a priority because the new area to the left chest is more than likely pressure form beign up more in and pressure from skin folds. Dressings were applied to all wounds per M.D. Instructions at this visit.

## 2021-04-13 ENCOUNTER — OFFICE VISIT (OUTPATIENT)
Dept: INTERNAL MEDICINE CLINIC | Age: 56
End: 2021-04-13

## 2021-04-13 ENCOUNTER — TELEPHONE (OUTPATIENT)
Dept: INTERNAL MEDICINE CLINIC | Age: 56
End: 2021-04-13

## 2021-04-13 VITALS
TEMPERATURE: 97.1 F | DIASTOLIC BLOOD PRESSURE: 88 MMHG | BODY MASS INDEX: 32.41 KG/M2 | SYSTOLIC BLOOD PRESSURE: 170 MMHG | RESPIRATION RATE: 12 BRPM | HEIGHT: 74 IN | HEART RATE: 72 BPM

## 2021-04-13 DIAGNOSIS — R05.9 COUGH: Primary | ICD-10-CM

## 2021-04-13 DIAGNOSIS — K21.9 GASTROESOPHAGEAL REFLUX DISEASE WITHOUT ESOPHAGITIS: ICD-10-CM

## 2021-04-13 DIAGNOSIS — E11.51 TYPE 2 DIABETES MELLITUS WITH DIABETIC PERIPHERAL ANGIOPATHY WITHOUT GANGRENE, WITH LONG-TERM CURRENT USE OF INSULIN (HCC): ICD-10-CM

## 2021-04-13 DIAGNOSIS — Z79.4 TYPE 2 DIABETES MELLITUS WITH DIABETIC PERIPHERAL ANGIOPATHY WITHOUT GANGRENE, WITH LONG-TERM CURRENT USE OF INSULIN (HCC): ICD-10-CM

## 2021-04-13 PROCEDURE — 2022F DILAT RTA XM EVC RTNOPTHY: CPT | Performed by: PHYSICIAN ASSISTANT

## 2021-04-13 PROCEDURE — G8417 CALC BMI ABV UP PARAM F/U: HCPCS | Performed by: PHYSICIAN ASSISTANT

## 2021-04-13 PROCEDURE — 3017F COLORECTAL CA SCREEN DOC REV: CPT | Performed by: PHYSICIAN ASSISTANT

## 2021-04-13 PROCEDURE — 1036F TOBACCO NON-USER: CPT | Performed by: PHYSICIAN ASSISTANT

## 2021-04-13 PROCEDURE — 99213 OFFICE O/P EST LOW 20 MIN: CPT | Performed by: PHYSICIAN ASSISTANT

## 2021-04-13 PROCEDURE — G8427 DOCREV CUR MEDS BY ELIG CLIN: HCPCS | Performed by: PHYSICIAN ASSISTANT

## 2021-04-13 PROCEDURE — 3046F HEMOGLOBIN A1C LEVEL >9.0%: CPT | Performed by: PHYSICIAN ASSISTANT

## 2021-04-13 RX ORDER — SACUBITRIL AND VALSARTAN 24; 26 MG/1; MG/1
1 TABLET, FILM COATED ORAL 2 TIMES DAILY
COMMUNITY
End: 2021-05-14 | Stop reason: SDUPTHER

## 2021-04-13 RX ORDER — BLOOD SUGAR DIAGNOSTIC
1 STRIP MISCELLANEOUS DAILY
Qty: 100 EACH | Refills: 11 | Status: SHIPPED | OUTPATIENT
Start: 2021-04-13 | End: 2022-01-01

## 2021-04-13 RX ORDER — PANTOPRAZOLE SODIUM 40 MG/1
TABLET, DELAYED RELEASE ORAL
Qty: 90 TABLET | Refills: 0 | Status: SHIPPED | OUTPATIENT
Start: 2021-04-13 | End: 2021-01-01

## 2021-04-13 ASSESSMENT — ENCOUNTER SYMPTOMS
COUGH: 1
SHORTNESS OF BREATH: 1
TROUBLE SWALLOWING: 0
CHEST TIGHTNESS: 1
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
## 2021-04-15 ENCOUNTER — CARE COORDINATION (OUTPATIENT)
Dept: CASE MANAGEMENT | Age: 56
End: 2021-04-15

## 2021-04-16 ENCOUNTER — HOSPITAL ENCOUNTER (OUTPATIENT)
Dept: WOUND CARE | Age: 56
Discharge: HOME | End: 2021-04-16
Payer: MEDICARE

## 2021-04-16 VITALS
BODY MASS INDEX: 32.41 KG/M2 | DIASTOLIC BLOOD PRESSURE: 75 MMHG | HEIGHT: 74 IN | RESPIRATION RATE: 16 BRPM | TEMPERATURE: 97.9 F | SYSTOLIC BLOOD PRESSURE: 112 MMHG | HEART RATE: 72 BPM

## 2021-04-16 DIAGNOSIS — L02.213 ABSCESS OF CHEST WALL: ICD-10-CM

## 2021-04-16 DIAGNOSIS — L98.492 ULCER OF CHEST WALL WITH FAT LAYER EXPOSED (HCC): Primary | ICD-10-CM

## 2021-04-16 DIAGNOSIS — T81.31XA POSTOPERATIVE WOUND DEHISCENCE, INITIAL ENCOUNTER: ICD-10-CM

## 2021-04-16 PROCEDURE — 99213 OFFICE O/P EST LOW 20 MIN: CPT | Performed by: INTERNAL MEDICINE

## 2021-04-16 PROCEDURE — 99214 OFFICE O/P EST MOD 30 MIN: CPT

## 2021-04-16 RX ORDER — LIDOCAINE HYDROCHLORIDE 40 MG/ML
SOLUTION TOPICAL ONCE
Status: DISCONTINUED | OUTPATIENT
Start: 2021-04-16 | End: 2021-04-17 | Stop reason: HOSPADM

## 2021-04-16 RX ORDER — LIDOCAINE 50 MG/G
OINTMENT TOPICAL ONCE
Status: CANCELLED | OUTPATIENT
Start: 2021-04-16 | End: 2021-04-16

## 2021-04-16 RX ORDER — LIDOCAINE 50 MG/G
OINTMENT TOPICAL ONCE
Status: DISCONTINUED | OUTPATIENT
Start: 2021-04-16 | End: 2021-04-17 | Stop reason: HOSPADM

## 2021-04-16 RX ORDER — FLUCONAZOLE 100 MG/1
100 TABLET ORAL DAILY
Qty: 7 TABLET | Refills: 0 | Status: SHIPPED | OUTPATIENT
Start: 2021-04-16 | End: 2021-04-23

## 2021-04-16 RX ORDER — LIDOCAINE 40 MG/G
CREAM TOPICAL ONCE
Status: CANCELLED | OUTPATIENT
Start: 2021-04-16 | End: 2021-04-16

## 2021-04-16 RX ORDER — LIDOCAINE 40 MG/G
CREAM TOPICAL ONCE
Status: DISCONTINUED | OUTPATIENT
Start: 2021-04-16 | End: 2021-04-17 | Stop reason: HOSPADM

## 2021-04-16 RX ORDER — DOXYCYCLINE HYCLATE 100 MG
100 TABLET ORAL 2 TIMES DAILY
Qty: 14 TABLET | Refills: 0 | Status: SHIPPED | OUTPATIENT
Start: 2021-04-16 | End: 2021-04-23

## 2021-04-16 RX ORDER — LIDOCAINE HYDROCHLORIDE 40 MG/ML
SOLUTION TOPICAL ONCE
Status: CANCELLED | OUTPATIENT
Start: 2021-04-16 | End: 2021-04-16

## 2021-04-16 NOTE — PLAN OF CARE
Pt to the [address] [place] [address] for follow up appointment. Wounds not debrided today. Dr Freida Bates discussed dressings with patient. Pt to follow up in the [address] [place] [address],[address] [place] [address] and continue with [address] [place] [address]. Pt to follow up in the [address] [place] [address] in 1 week. Discharge instructions reviewed with patient, all questions answered, copy given to patient. Dressings were applied to all wounds per M.D. Instructions at this visit.

## 2021-04-16 NOTE — PLAN OF CARE
[facility] Physician Orders and Discharge [facility]  [address]  [place], [facility]  Telephone: [phone]      Fax: [phone]        Your home care company:   [facility]     Your wound-care supplies will be provided by:  Home care     NAME:  Shara Choi   YOB: 1965  PRIMARY DIAGNOSIS FOR WOUND CARE CENTER:  Pressure Ulcer Stage 2     Wound cleansing:   Do not scrub or use excessive force. Wash hands with soap and water before and after dressing changes. Prior to applying a clean dressing, cleanse wound with normal saline, wound cleanser, or mild soap and water. Ask your physician or nurse before getting the wound(s) wet in the shower. Wound care for home:     Right Knee:    xeroform   cast padding x2   Ace for stump and knee   Leave on all week    Right medial thigh:    Antibiotic ointment    Mepilex border    Leave on all week     Buttocks Wound: HEALED  Calmoseptine as needed to protect    Left great toe wound:   antibiotic ointment   adaptic    2x2   tubular gauze  Leave on all week. Right Chest wall Wound:  Calmoseptine to skin irritation  Silver Alginate tucked in to the wound  Cover with an absorptive dressing  Change 3 x week          Chronic Open Back Wound:   Spray Hypochlorous Acid into wound let sit, do  Not rinse  Calmoseptine to alin wound  Silver Alginate for drainage as needed (we are applying today)  Cover with a Mepilex Border   Change 3 x week           Please note, all wounds (unless stated otherwise here) were mechanically debrided at the time of cleansing here in the wound-care center today, so a small amount of pain, drainage or bleeding from that process might be expected, and is normal.      All products for home use, including multiple products for a single wound if applicable, are medically necessary in order to achieve the best chance at timely wound healing.  See provider documentation for details if needed. Substituted dressings applied in the [facility] today, if applicable:           New orders for this week (labs, imaging, medications, etc.):     Dr. Sherryle Jasmine would like you to purchase a bottle of (HIBICLENS or the generic version) and after your bath use it on the areas that are more troublesome such as your knee, chest and even around the back wound, make sure you let it dry and do not rinse     Stop by the pharmacy and  antibiotics and antifungal.  Take as prescribed      Additional instructions for specific diagnoses:     n/a     F/U Appointment is with Dr. Sherryle Jasmine in 1 week  on                                   at                       .     Your nurse  is Heidi FLOWERS. If we applied slip-resistant hospital socks today, be sure to remove them at least once a day to inspect your toes or feet, even if you're not changing the wraps or dressings underneath. If you see anything concerning (redness, excess moisture, etc), please call and let us know right away.      Should you experience any significant changes in your wound(s) (including redness, increased warmth, increased pain, increased drainage, odor, or fever) or have questions about your wound care, please contact the [facility] at [phone] Monday-Thursday from 8:00 am - 4:30 pm, or Friday from 8:00 am - 2:30 pm.  If you need help with your wound outside these hours and cannot wait until we are again available, contact your home-care company (if applicable), your PCP, or go to the nearest emergency room

## 2021-04-17 PROBLEM — R05.3 CHRONIC COUGH: Status: ACTIVE | Noted: 2021-04-17

## 2021-04-17 PROBLEM — L97.111 ULCER OF RIGHT THIGH, LIMITED TO BREAKDOWN OF SKIN (HCC): Status: ACTIVE | Noted: 2021-02-22

## 2021-04-17 PROBLEM — E87.5 HYPERKALEMIA: Status: RESOLVED | Noted: 2021-01-27 | Resolved: 2021-04-17
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
## 2021-04-23 ENCOUNTER — TELEPHONE (OUTPATIENT)
Dept: INTERNAL MEDICINE CLINIC | Age: 56
End: 2021-04-23

## 2021-04-23 ENCOUNTER — HOSPITAL ENCOUNTER (OUTPATIENT)
Dept: WOUND CARE | Age: 56
Discharge: HOME | End: 2021-04-23
Payer: MEDICARE

## 2021-04-23 VITALS
BODY MASS INDEX: 32.41 KG/M2 | TEMPERATURE: 97.8 F | DIASTOLIC BLOOD PRESSURE: 72 MMHG | HEIGHT: 74 IN | RESPIRATION RATE: 16 BRPM | HEART RATE: 68 BPM | SYSTOLIC BLOOD PRESSURE: 108 MMHG

## 2021-04-23 DIAGNOSIS — T81.31XA POSTOPERATIVE WOUND DEHISCENCE, INITIAL ENCOUNTER: ICD-10-CM

## 2021-04-23 DIAGNOSIS — L98.492 ULCER OF CHEST WALL WITH FAT LAYER EXPOSED (HCC): Primary | ICD-10-CM

## 2021-04-23 DIAGNOSIS — L02.213 ABSCESS OF CHEST WALL: ICD-10-CM

## 2021-04-23 PROCEDURE — 17250 CHEM CAUT OF GRANLTJ TISSUE: CPT

## 2021-04-23 PROCEDURE — 17250 CHEM CAUT OF GRANLTJ TISSUE: CPT | Performed by: INTERNAL MEDICINE

## 2021-04-23 RX ORDER — LIDOCAINE 40 MG/G
CREAM TOPICAL ONCE
Status: CANCELLED | OUTPATIENT
Start: 2021-04-23 | End: 2021-04-23

## 2021-04-23 RX ORDER — LIDOCAINE 50 MG/G
OINTMENT TOPICAL ONCE
Status: DISCONTINUED | OUTPATIENT
Start: 2021-04-23 | End: 2021-04-24 | Stop reason: HOSPADM

## 2021-04-23 RX ORDER — LIDOCAINE 40 MG/G
CREAM TOPICAL ONCE
Status: DISCONTINUED | OUTPATIENT
Start: 2021-04-23 | End: 2021-04-24 | Stop reason: HOSPADM

## 2021-04-23 RX ORDER — LIDOCAINE HYDROCHLORIDE 40 MG/ML
SOLUTION TOPICAL ONCE
Status: DISCONTINUED | OUTPATIENT
Start: 2021-04-23 | End: 2021-04-24 | Stop reason: HOSPADM

## 2021-04-23 RX ORDER — LIDOCAINE 50 MG/G
OINTMENT TOPICAL ONCE
Status: CANCELLED | OUTPATIENT
Start: 2021-04-23 | End: 2021-04-23

## 2021-04-23 RX ORDER — LIDOCAINE HYDROCHLORIDE 40 MG/ML
SOLUTION TOPICAL ONCE
Status: CANCELLED | OUTPATIENT
Start: 2021-04-23 | End: 2021-04-23

## 2021-04-23 RX ORDER — BUDESONIDE AND FORMOTEROL FUMARATE DIHYDRATE 80; 4.5 UG/1; UG/1
1 AEROSOL RESPIRATORY (INHALATION) 2 TIMES DAILY
Qty: 1 INHALER | Refills: 0 | Status: SHIPPED
Start: 2021-04-23 | End: 2021-04-26 | Stop reason: ALTCHOICE

## 2021-04-23 ASSESSMENT — PAIN SCALES - GENERAL: PAINLEVEL_OUTOF10: 0

## 2021-04-23 NOTE — TELEPHONE ENCOUNTER
----- Message from Sherrill Parks MD sent at 4/23/2021  1:38 PM EDT -----  Contact: [phone]  Stop this inhaler , start on symbicort 80/4.5 1 puff bid  ----- Message -----  From: Sheldon Pittman  Sent: 4/23/2021   1:24 PM EDT  To: Sherrill Parks MD    Pt was seen last week for a cough  was given salmeterol (SEREVENT DISKUS) 50 MCG/DOSE diskus inhaler and referred to Dr. Malissa Bah he can not get in till June but the inhaler is not working was wanting something else that could help     Thank you              [address] [place] [address] [address] [place] [address] [address] [facility] [address]

## 2021-04-23 NOTE — PLAN OF CARE
Some worsening of skin to buttocks and upper thigh area, noted to be from fact that due to coughing patien is unable to lay flat and is now more in a sitting position. Patient is to call his PCP as directed next week is still no improvement with cough as PCP is aware and patient is not able to see pulmonology until June. Discharge instructions reviewed with patient, all questions answered, copy given to patient. Dressings were applied to all wounds per M.D. Instructions at this visit.

## 2021-04-23 NOTE — TELEPHONE ENCOUNTER
----- Message from Jaguar Esparza sent at 4/23/2021  1:50 PM EDT -----  Contact: [phone]  Left vm to return call.  ----- Message -----  From: Joann Dao MD  Sent: 4/23/2021   1:38 PM EDT  To: Jaguar Esparza    Stop this inhaler , start on symbicort 80/4.5 1 puff bid  ----- Message -----  From: Migel Lipscomb  Sent: 4/23/2021   1:24 PM EDT  To: Joann Dao MD    Pt was seen last week for a cough  was given salmeterol (SEREVENT DISKUS) 50 MCG/DOSE diskus inhaler and referred to Dr. Liliam Hernandez he can not get in till June but the inhaler is not working was wanting something else that could help     Thank you              DoYouRemember [address] [place] [address]  [facility] [address]

## 2021-04-24 PROBLEM — L02.213 ABSCESS OF CHEST WALL: Status: ACTIVE | Noted: 2021-04-24
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
## 2021-04-26 ENCOUNTER — TELEPHONE (OUTPATIENT)
Dept: INTERNAL MEDICINE CLINIC | Age: 56
End: 2021-04-26

## 2021-04-26 RX ORDER — OXYCODONE HYDROCHLORIDE 5 MG/1
5 TABLET ORAL EVERY 6 HOURS PRN
Qty: 28 TABLET | Refills: 0 | Status: SHIPPED | OUTPATIENT
Start: 2021-04-26 | End: 2021-05-08

## 2021-04-26 NOTE — TELEPHONE ENCOUNTER
----- Message from Kain Hudson MD sent at 4/26/2021  2:55 PM EDT -----  Tello Cason then once daily  ----- Message -----  From: Mauricio De Los Santos  Sent: 4/26/2021  10:47 AM EDT  To: Kain Hudson MD    Symbicort not on formulary. Alternatives are Breo and Advair.

## 2021-04-27 ENCOUNTER — HOSPITAL ENCOUNTER (OUTPATIENT)
Age: 56
Setting detail: SPECIMEN
Discharge: HOME | End: 2021-04-27
Payer: MEDICARE

## 2021-04-27 ENCOUNTER — TELEPHONE (OUTPATIENT)
Dept: INTERNAL MEDICINE CLINIC | Age: 56
End: 2021-04-27

## 2021-04-27 LAB
BACTERIA: ABNORMAL /HPF
BILIRUBIN URINE: NEGATIVE
BLOOD, URINE: ABNORMAL
CLARITY: ABNORMAL
COLOR: YELLOW
EPITHELIAL CELLS, UA: ABNORMAL /HPF (ref 0–5)
GLUCOSE URINE: NEGATIVE MG/DL
KETONES, URINE: NEGATIVE MG/DL
LEUKOCYTE ESTERASE, URINE: ABNORMAL
MICROSCOPIC EXAMINATION: YES
NITRITE, URINE: NEGATIVE
PH UA: 6.5 (ref 5–8)
PROTEIN UA: 100 MG/DL
RBC UA: >100 /HPF (ref 0–4)
SPECIFIC GRAVITY UA: 1.02 (ref 1–1.03)
URINE TYPE: ABNORMAL
UROBILINOGEN, URINE: 0.2 E.U./DL
WBC UA: ABNORMAL /HPF (ref 0–5)

## 2021-04-27 PROCEDURE — 87086 URINE CULTURE/COLONY COUNT: CPT

## 2021-04-27 PROCEDURE — 87186 SC STD MICRODIL/AGAR DIL: CPT

## 2021-04-27 PROCEDURE — 87088 URINE BACTERIA CULTURE: CPT

## 2021-04-27 PROCEDURE — 81001 URINALYSIS AUTO W/SCOPE: CPT

## 2021-04-27 RX ORDER — FLUTICASONE FUROATE AND VILANTEROL TRIFENATATE 100; 25 UG/1; UG/1
2 POWDER RESPIRATORY (INHALATION) DAILY
Qty: 3 EACH | Refills: 0 | Status: SHIPPED | OUTPATIENT
Start: 2021-04-27 | End: 2021-01-01 | Stop reason: SDUPTHER

## 2021-04-27 NOTE — TELEPHONE ENCOUNTER
----- Message from Noreene Goodpasture, MD sent at 4/27/2021  2:31 PM EDT -----  Sent  ----- Message -----  From: Elisha Bueno  Sent: 4/27/2021   1:28 PM EDT  To: Noreene Goodpasture, MD    Strength of Breo?

## 2021-04-28 ENCOUNTER — TELEPHONE (OUTPATIENT)
Dept: INTERNAL MEDICINE CLINIC | Age: 56
End: 2021-04-28

## 2021-04-28 NOTE — TELEPHONE ENCOUNTER
----- Message from Jero Cordero MD sent at 4/28/2021 11:46 AM EDT -----  Contact: [facility] [phone]  1 puff  ----- Message -----  From: Benja Chaidez  Sent: 4/28/2021   9:26 AM EDT  To: Jero Cordero MD    Pharmacy needing to verify directions for [facility]. You prescribed 2 puffs daily, but usually it's 1 puff daily. Please advise.

## 2021-04-29 LAB
ORGANISM: ABNORMAL
URINE CULTURE, ROUTINE: ABNORMAL

## 2021-04-29 RX ORDER — CEFDINIR 300 MG/1
300 CAPSULE ORAL 2 TIMES DAILY
Qty: 20 CAPSULE | Refills: 0 | Status: SHIPPED | OUTPATIENT
Start: 2021-04-29 | End: 2021-05-09

## 2021-04-29 NOTE — PROGRESS NOTES
[facility] Progress Note    Ashwin Yen     : 1965    DATE OF VISIT:  2021    Subjective:     Ashwin Yen is a 50 y.o. male who has an infection-associated ulcer located on the chest (on the lower right). Significant symptoms or pertinent wound history since last visit: a couple of things this week have improved, a few have not. No fever or chills. Left great toe ulcer seems better with a once-weekly dressing plan. T-spine seems about the same, variable drainage and some reactive redness and moisture of the skin around it. Right chest and flank rash seems maybe 75% improved with local care and Diflucan. Right knee and thigh slowly improving. Left chest wall presumed abscess hasn't really drained much at all with warm compresses and topical Abx, pain there is pretty significant. And his cough is no better with the long- and short-acting beta agonist inhalers, so he's having a hard time lying supine, so there are ongoing issues with skin pressure, shear and friction at his lower chest, and especially the ischia and perineum this week. Additional ulcer(s) noted? yes. As above. His current medication list consists of Alirocumab, Insulin Pen Needle, Insulin Syringe-Needle U-100, apixaban, aspirin, blood glucose test strips, cetirizine, cyclobenzaprine, docusate sodium, ferrous sulfate, insulin glargine, insulin lispro, magnesium oxide, metFORMIN, metoprolol succinate, nitroGLYCERIN, nystatin, oxyCODONE, pantoprazole, polyethylene glycol, promethazine, sacubitril-valsartan, senna, torsemide, traZODone, and vitamin D. Finished a week of doxycycline for the left chest wall presumed abscess; finished a week of Diflucan for cutaneous Candidiasis.     Allergies: Benadryl [diphenhydramine hcl], Statins [statins], Cephalexin, Morphine, Penicillins, and Sulfa antibiotics    Objective:     Vitals:    21 1031   BP: 108/72   Pulse: 68   Resp: 16   Temp: 97.8 °F (36.6 °C)   TempSrc: Oral   Weight: Comment: no weigh bed available   Height: 6' 2\" (1.88 m)     AAOx3, overweight, fatigued, not acutely ill appearing  Left pedal pulses Dopplerable, foot warm, slow cap refill  Stable LE lymphedema  Right chest and flank rash about 75% better  Left lower chest fluctuant lesion more tender this week, not really erythematous at all, barely a pinpoint opening to it  Chetna-ulcer skin: reactive erythema and moisture at back, less redness and rash at right chest, usual hyperkeratosis at right knee, healthy at right thigh and left great toe, increased friction changes at right buttock this week. Ulcer(s): T-spine with stable hardware exposure and granulation; right chest wall with some hypergranulation, still medial tunneling, a bit of new epidermal tissue superiorly, no pus, no real necrosis; right knee tiny and clean; left toe smaller, red, granular and clean; right thigh slowly smaller, still a bit of fibrotic dermal tissue. Photos also saved in electronic chart.     Today's Wound Measurements, per RN documentation:  Wound 04/16/21 #69, left great toe medial ? , traumatic, partial thickness, onset 4/13/21-Wound Length (cm): 0.5 cm  Wound 03/19/21 #65, left great toe, traumatic, partial thickness, onset 3/17/21-Wound Length (cm): 0.5 cm  Wound 04/09/21 #66, Right Knee cluster, Trauma, Full Thickness, Onsret 4/7/21-Wound Length (cm): 0.1 cm  Wound 04/09/21 #68, Right Posterior Thigh, Trauma, Full Thickness, Onsret 4/7/21-Wound Length (cm): 0.3 cm  Wound 02/19/21 #63, Right Chest Wall (inframammary), Abscess, Full Thickness, Onset 2/16/21-Wound Length (cm): 0.5 cm  Wound 04/09/21 #67, Left Chest Wall (inframmatory),Abcess, Full Thickness, Onsret 4/7/21-Wound Length (cm): 0 cm  Wound 02/05/21 #62, Right Buttock cluster, Pressure Injury, Stage 2, Onset 1/15/21-Wound Length (cm): 3 cm    Wound 04/16/21 #69, left great toe medial ? , traumatic, partial thickness, onset 4/13/21-Wound Width (cm): 0.4 cm  Wound 03/19/21 #65, left great toe, traumatic, partial thickness, onset 3/17/21-Wound Width (cm): 0.5 cm  Wound 04/09/21 #66, Right Knee cluster, Trauma, Full Thickness, Onsret 4/7/21-Wound Width (cm): 0.1 cm  Wound 04/09/21 #68, Right Posterior Thigh, Trauma, Full Thickness, Onsret 4/7/21-Wound Width (cm): 3 cm  Wound 02/19/21 #63, Right Chest Wall (inframammary), Abscess, Full Thickness, Onset 2/16/21-Wound Width (cm): 3 cm  Wound 04/09/21 #67, Left Chest Wall (inframmatory),Abcess, Full Thickness, Onsret 4/7/21-Wound Width (cm): 0 cm  Wound 02/05/21 #62, Right Buttock cluster, Pressure Injury, Stage 2, Onset 1/15/21-Wound Width (cm): 1.5 cm    Wound 04/16/21 #69, left great toe medial ? , traumatic, partial thickness, onset 4/13/21-Wound Depth (cm): 0.1 cm  Wound 03/19/21 #65, left great toe, traumatic, partial thickness, onset 3/17/21-Wound Depth (cm): 0.1 cm  Wound 04/09/21 #66, Right Knee cluster, Trauma, Full Thickness, Onsret 4/7/21-Wound Depth (cm): 0.1 cm  Wound 04/09/21 #68, Right Posterior Thigh, Trauma, Full Thickness, Onsret 4/7/21-Wound Depth (cm): 0.1 cm  Wound 02/19/21 #63, Right Chest Wall (inframammary), Abscess, Full Thickness, Onset 2/16/21-Wound Depth (cm): 0.6 cm  Wound 04/09/21 #67, Left Chest Wall (inframmatory),Abcess, Full Thickness, Onsret 4/7/21-Wound Depth (cm): 0 cm  Wound 02/05/21 #62, Right Buttock cluster, Pressure Injury, Stage 2, Onset 1/15/21-Wound Depth (cm): 0.1 cm    Assessment:     Patient Active Problem List   Diagnosis Code    Mixed hyperlipidemia E78.2    CAD S/P percutaneous coronary angioplasty I25.10, Z98.61    Paraplegia, complete (HCC) G82.21    Chronic back pain M54.9, G89.29    Arthritis M19.90    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 [address] T84. 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    LIBORIO on CPAP G47.33, Z99.89    Hyperglycemia due to diabetes mellitus (HCC) E11.65    Type 2 diabetes mellitus with diabetic peripheral angiopathy without gangrene, with long-term current use of insulin (HCC) E11.51, Z79.4    Ulcer of chest wall with fat layer exposed, following recent abscess L98.492    Ulcer of right thigh, limited to breakdown of skin ([facility]) L97.111    Chronic cough R05    Abscess of chest wall (left side) L02.213       Assessment of today's active condition(s): DM, paraplegia, lymphedema, multiple nonhealing ulcers; T-spine care is palliative; left toe ought to do fine; right thigh and knee ought to do fine if lymphedema doesn't increase suddenly; chest and buttock areas, oddly, might benefit most from more aggressive treatment of his chronic, presumed bronchospastic, cough, so that he can then lie supine more, and get some pressure and friction and shear decreased. I hesitate to I&D that left chest lesion right now if we can avoid it, for fear that it's just going to progress to another sizeable, nonhealing, bleeding open wound; as long as he's not febrile with spreading cellulitis, I'd like to take things slowly there. Factors contributing to occurrence and/or persistence of the chronic ulcer include lymphedema, diabetes, chronic pressure, decreased mobility, shear force and obesity. Medical necessity of today's visit is shown by the above documentation. Sharp debridement is not indicated today, based upon the exam findings in the wound(s) above. Procedure note:     Consent obtained. Time out performed per [facility] policy.     Anesthetic  Anesthetic: 4% Lidocaine Cream     To encourage better epithelial cell coverage, I did use AgNO3 to chemically cauterize hypergranulation tissue on the chest (on the lower right) ulcer(s). This was tolerated well, with no significant pain or skin injury. Discharge plan:     Treatment in the wound care center today, per RN documentation: Wound 04/16/21 #69, left great toe medial ? , traumatic, partial thickness, onset 4/13/21-Dressing/Treatment: (PSO, dry dressing )  Wound 03/19/21 #65, left great toe, traumatic, partial thickness, onset 3/17/21-Dressing/Treatment: (PSO, dry dressing )  Wound 04/09/21 #66, Right Knee cluster, Trauma, Full Thickness, Onsret 4/7/21-Dressing/Treatment: (xeroform,castpad,ace)  Wound 04/09/21 #68, Right Posterior Thigh, Trauma, Full Thickness, Onsret 4/7/21-Dressing/Treatment: (PSO, mepilex)  Wound 02/19/21 #63, Right Chest Wall (inframammary), Abscess, Full Thickness, Onset 2/16/21-Dressing/Treatment: (kiran,opticell,dry dressing )  Wound 04/09/21 #67, Left Chest Wall (inframmatory),Abcess, Full Thickness, Onsret 4/7/21-Dressing/Treatment: (calmoseptine)  Wound 02/05/21 #62, Right Buttock cluster, Pressure Injury, Stage 2, Onset 1/15/21-Dressing/Treatment: (calmoseptine). No additional Abx this week. Keep left toe and right knee and thigh ulcers dressed for the week. Continue warm, moist compresses and topical Abx to left chest wall a few times daily if possible. Asked him to call his PCP to see if an inhaled steroid might be indicated to help his cough. Try as best he can to keep up with offloading; keep up good work with protein intake and glucose control. Home treatment: good handwashing before and after any dressing changes. Cleanse wound with saline or soap & water before dressing change. May use Vaseline (petrolatum), Aquaphor, Aveeno, CeraVe, Cetaphil, Eucerin, Lubriderm, etc for dry skin. Dressing type for home: basically as above for the T-spine TIW, the right chest and buttocks daily.  Written discharge instructions given to patient. Follow up in 1 week.     Electronically signed by Mel Hudson MD on 4/29/2021 at 6:20 PM.

## 2021-04-30 ENCOUNTER — HOSPITAL ENCOUNTER (OUTPATIENT)
Dept: WOUND CARE | Age: 56
Discharge: HOME | End: 2021-04-30
Payer: MEDICARE

## 2021-04-30 VITALS
SYSTOLIC BLOOD PRESSURE: 83 MMHG | BODY MASS INDEX: 32.41 KG/M2 | HEIGHT: 74 IN | TEMPERATURE: 98.2 F | HEART RATE: 88 BPM | RESPIRATION RATE: 16 BRPM | DIASTOLIC BLOOD PRESSURE: 56 MMHG

## 2021-04-30 DIAGNOSIS — T81.31XA POSTOPERATIVE WOUND DEHISCENCE, INITIAL ENCOUNTER: ICD-10-CM

## 2021-04-30 DIAGNOSIS — L98.492 ULCER OF CHEST WALL WITH FAT LAYER EXPOSED (HCC): Primary | ICD-10-CM

## 2021-04-30 PROCEDURE — 87205 SMEAR GRAM STAIN: CPT

## 2021-04-30 PROCEDURE — 17250 CHEM CAUT OF GRANLTJ TISSUE: CPT

## 2021-04-30 PROCEDURE — 87186 SC STD MICRODIL/AGAR DIL: CPT

## 2021-04-30 PROCEDURE — 17250 CHEM CAUT OF GRANLTJ TISSUE: CPT | Performed by: INTERNAL MEDICINE

## 2021-04-30 PROCEDURE — 87077 CULTURE AEROBIC IDENTIFY: CPT

## 2021-04-30 PROCEDURE — 87070 CULTURE OTHR SPECIMN AEROBIC: CPT

## 2021-04-30 RX ORDER — LIDOCAINE 40 MG/G
CREAM TOPICAL ONCE
Status: CANCELLED | OUTPATIENT
Start: 2021-04-30 | End: 2021-04-30

## 2021-04-30 RX ORDER — LIDOCAINE HYDROCHLORIDE 40 MG/ML
SOLUTION TOPICAL ONCE
Status: CANCELLED | OUTPATIENT
Start: 2021-04-30 | End: 2021-04-30

## 2021-04-30 RX ORDER — LIDOCAINE 40 MG/G
CREAM TOPICAL ONCE
Status: DISCONTINUED | OUTPATIENT
Start: 2021-04-30 | End: 2021-05-01 | Stop reason: HOSPADM

## 2021-04-30 RX ORDER — LIDOCAINE 50 MG/G
OINTMENT TOPICAL ONCE
Status: CANCELLED | OUTPATIENT
Start: 2021-04-30 | End: 2021-04-30

## 2021-04-30 ASSESSMENT — PAIN SCALES - GENERAL: PAINLEVEL_OUTOF10: 0

## 2021-04-30 NOTE — PLAN OF CARE
[facility] Physician Orders and Discharge [address], [address] [facility], [facility]  Telephone: [phone]      Fax: [phone]        Your home care [facility] wound-care supplies will be provided by: [facility] care     NAME: Hesham Dye Jr   DATE of BIRTH:  1965  PRIMARY DIAGNOSIS FOR WOUND CARE CENTER:  Pressure Ulcer Stage 2     Wound cleansing:   Do not scrub or use excessive force. Wash hands with soap and water before and after dressing changes. Prior to applying a clean dressing, cleanse wound with normal saline, wound cleanser, or mild soap and water.  Ask your physician or nurse before getting the wound(s) wet in the shower.                Wound care for home:     Right Knee:   Xeroform  cast padding x2  Ace for stump and knee  Leave on all week     Right medial thigh:  Antibiotic ointment  Mepilex border  Leave on all week     Left great toe wound:  antibiotic ointment   adaptic    2x2   tubular gauze  Leave on all week     Right Chest wall Wound:  Dr. Jasmeet Juares used Silver Nitrate on your wound today.  The wound will be black or silver in appearance  for the next several days, this is normal.  Calmoseptine to skin irritation  Silver Alginate tucked in to the wound  Cover with an absorptive dressing  Change 3 x week     Chronic Open Back Wound:   Spray Hypochlorous Acid into wound let sit, do  Not rinse  Calmoseptine to alin wound  Silver Alginate for drainage as needed (we are applying today)  Cover with a Mepilex Border   Change 3 x week     Left Chest Wall Wound:   CONTINUE 2-3 times a day apply warm compress then apply small amount of antibiotic ointment (we do not want it dry it needs to continue to drain)     Buttocks/Posterior Thigh:  Calmoseptine to protect         Please note, all wounds (unless stated otherwise here) were mechanically debrided at the time of cleansing here in the wound-care center today, so a small amount of pain, drainage or bleeding from that process might be expected, and is normal.      All products for home use, including multiple products for a single wound if applicable, are medically necessary in order to achieve the best chance at timely wound healing. See provider documentation for details if needed.     Substituted dressings applied in the [facility] today, if applicable:           New orders for this week (labs, imaging, medications, etc.):     Wound Culture taken today from Left Chest Wound-stay on your OMNICEF as ordered     Additional instructions for specific diagnoses:     Continue to use the HIBICLENS (or the generic version) after your bath on the areas that are more troublesome such as your knee, chest and even around the back wound, make sure you let it dry, do not rinse        F/U Appointment is with Dr. Vane Coreas in 1 week [facility]                       .     Your nurse  is Heidi FLOWERS.      If we applied slip-resistant hospital socks today, be sure to remove them at least once a day to inspect your toes or feet, even if you're not changing the wraps or dressings underneath.  If you see anything concerning (redness, excess moisture, etc), please call and let us know right away.     Should you experience any significant changes in your wound(s) (including redness, increased warmth, increased pain, increased drainage, odor, or fever) or have questions about your wound care, please contact the [address] [place] [address] at [phone] Monday-Thursday from 8:00 am - 4:30 pm, or Friday from 8:00 am - 2:30 pm.  If you need help with your wound outside these hours and cannot wait until we are again available, contact your home-care company (if applicable), your PCP, or go to the nearest emergency room

## 2021-05-03 ENCOUNTER — HOSPITAL ENCOUNTER (INPATIENT)
Age: 56
LOS: 4 days | Discharge: HOME | DRG: 292 | End: 2021-05-08
Attending: EMERGENCY MEDICINE | Admitting: HOSPITALIST
Payer: MEDICARE

## 2021-05-03 ENCOUNTER — APPOINTMENT (OUTPATIENT)
Dept: GENERAL RADIOLOGY | Age: 56
DRG: 292 | End: 2021-05-03
Payer: MEDICARE

## 2021-05-03 DIAGNOSIS — I50.33 ACUTE ON CHRONIC DIASTOLIC CONGESTIVE HEART FAILURE (HCC): Primary | ICD-10-CM

## 2021-05-03 DIAGNOSIS — T14.8XXA MULTIPLE OPEN WOUNDS WITHOUT COMPLICATION: ICD-10-CM

## 2021-05-03 DIAGNOSIS — L02.213 ABSCESS OF CHEST WALL: ICD-10-CM

## 2021-05-03 DIAGNOSIS — N17.9 AKI (ACUTE KIDNEY INJURY) (HCC): ICD-10-CM

## 2021-05-03 LAB
A/G RATIO: 0.8 (ref 1.1–2.2)
ALBUMIN SERPL-MCNC: 3.3 G/DL (ref 3.4–5)
ALP BLD-CCNC: 203 U/L (ref 40–129)
ALT SERPL-CCNC: 25 U/L (ref 10–40)
ANION GAP SERPL CALCULATED.3IONS-SCNC: 14 MMOL/L (ref 3–16)
AST SERPL-CCNC: 22 U/L (ref 15–37)
BASOPHILS ABSOLUTE: 0.1 K/UL (ref 0–0.2)
BASOPHILS RELATIVE PERCENT: 0.6 %
BILIRUB SERPL-MCNC: 0.4 MG/DL (ref 0–1)
BUN BLDV-MCNC: 84 MG/DL (ref 7–20)
CALCIUM SERPL-MCNC: 8.7 MG/DL (ref 8.3–10.6)
CHLORIDE BLD-SCNC: 95 MMOL/L (ref 99–110)
CO2: 23 MMOL/L (ref 21–32)
CREAT SERPL-MCNC: 2.4 MG/DL (ref 0.9–1.3)
EOSINOPHILS ABSOLUTE: 0.2 K/UL (ref 0–0.6)
EOSINOPHILS RELATIVE PERCENT: 2.5 %
GFR AFRICAN AMERICAN: 34
GFR NON-AFRICAN AMERICAN: 28
GLOBULIN: 4.1 G/DL
GLUCOSE BLD-MCNC: 228 MG/DL (ref 70–99)
GRAM STAIN RESULT: ABNORMAL
HCT VFR BLD CALC: 36 % (ref 40.5–52.5)
HEMOGLOBIN: 11.2 G/DL (ref 13.5–17.5)
LYMPHOCYTES ABSOLUTE: 0.8 K/UL (ref 1–5.1)
LYMPHOCYTES RELATIVE PERCENT: 8.1 %
MCH RBC QN AUTO: 23.8 PG (ref 26–34)
MCHC RBC AUTO-ENTMCNC: 31 G/DL (ref 31–36)
MCV RBC AUTO: 76.9 FL (ref 80–100)
MONOCYTES ABSOLUTE: 0.7 K/UL (ref 0–1.3)
MONOCYTES RELATIVE PERCENT: 6.9 %
NEUTROPHILS ABSOLUTE: 7.8 K/UL (ref 1.7–7.7)
NEUTROPHILS RELATIVE PERCENT: 81.9 %
ORGANISM: ABNORMAL
ORGANISM: ABNORMAL
PDW BLD-RTO: 19.3 % (ref 12.4–15.4)
PLATELET # BLD: 215 K/UL (ref 135–450)
PMV BLD AUTO: 9 FL (ref 5–10.5)
POTASSIUM SERPL-SCNC: 5.1 MMOL/L (ref 3.5–5.1)
PRO-BNP: ABNORMAL PG/ML (ref 0–124)
RBC # BLD: 4.69 M/UL (ref 4.2–5.9)
SODIUM BLD-SCNC: 132 MMOL/L (ref 136–145)
TOTAL PROTEIN: 7.4 G/DL (ref 6.4–8.2)
TROPONIN: 0.19 NG/ML
WBC # BLD: 9.5 K/UL (ref 4–11)
WOUND/ABSCESS: ABNORMAL
WOUND/ABSCESS: ABNORMAL

## 2021-05-03 PROCEDURE — 6360000002 HC RX W HCPCS: Performed by: EMERGENCY MEDICINE

## 2021-05-03 PROCEDURE — 81001 URINALYSIS AUTO W/SCOPE: CPT

## 2021-05-03 PROCEDURE — 84484 ASSAY OF TROPONIN QUANT: CPT

## 2021-05-03 PROCEDURE — 87635 SARS-COV-2 COVID-19 AMP PRB: CPT

## 2021-05-03 PROCEDURE — 80053 COMPREHEN METABOLIC PANEL: CPT

## 2021-05-03 PROCEDURE — 85025 COMPLETE CBC W/AUTO DIFF WBC: CPT

## 2021-05-03 PROCEDURE — 71045 X-RAY EXAM CHEST 1 VIEW: CPT

## 2021-05-03 PROCEDURE — 99285 EMERGENCY DEPT VISIT HI MDM: CPT

## 2021-05-03 PROCEDURE — 96374 THER/PROPH/DIAG INJ IV PUSH: CPT

## 2021-05-03 PROCEDURE — 83880 ASSAY OF NATRIURETIC PEPTIDE: CPT

## 2021-05-03 PROCEDURE — 93005 ELECTROCARDIOGRAM TRACING: CPT | Performed by: EMERGENCY MEDICINE

## 2021-05-03 RX ORDER — FUROSEMIDE 10 MG/ML
40 INJECTION INTRAMUSCULAR; INTRAVENOUS ONCE
Status: COMPLETED | OUTPATIENT
Start: 2021-05-03 | End: 2021-05-03

## 2021-05-03 RX ADMIN — FUROSEMIDE 40 MG: 10 INJECTION, SOLUTION INTRAMUSCULAR; INTRAVENOUS at 23:37

## 2021-05-03 ASSESSMENT — PAIN SCALES - GENERAL: PAINLEVEL_OUTOF10: 4

## 2021-05-03 ASSESSMENT — PAIN DESCRIPTION - ORIENTATION: ORIENTATION: LEFT

## 2021-05-03 NOTE — PLAN OF CARE
Patient is currently on FRANKS MAHIN and Dr. Jose Roldan wants him to stay on this. Left chest is now open and draining and Dr. Susana Aviles did obtain a wound culture at visit today. Patient will be called if any chages need to occur when culture is complete. Patient state she has some improvement in cough after starting new inhaler. Patient unfortunately is up more than ussual due to this chronic cough that he is working with his PCP and scheduled to see Pulmonology, so offloading is really not optimal buy no fault of the patient. Discharge instructions reviewed with patient, all questions answered, copy given to patient. Dressings were applied to all wounds per M.D. Instructions at this visit.

## 2021-05-04 PROBLEM — E11.9 DIABETES MELLITUS (HCC): Status: ACTIVE | Noted: 2019-01-15

## 2021-05-04 PROBLEM — I50.9 HEART FAILURE (HCC): Status: ACTIVE | Noted: 2021-05-04

## 2021-05-04 LAB
AMORPHOUS: ABNORMAL /HPF
BACTERIA: ABNORMAL /HPF
BILIRUBIN URINE: NEGATIVE
BLOOD, URINE: ABNORMAL
CLARITY: CLEAR
COLOR: YELLOW
EKG ATRIAL RATE: 84 BPM
EKG DIAGNOSIS: NORMAL
EKG P AXIS: 71 DEGREES
EKG P-R INTERVAL: 182 MS
EKG Q-T INTERVAL: 336 MS
EKG QRS DURATION: 84 MS
EKG QTC CALCULATION (BAZETT): 397 MS
EKG R AXIS: -20 DEGREES
EKG T AXIS: 160 DEGREES
EKG VENTRICULAR RATE: 84 BPM
EPITHELIAL CELLS, UA: ABNORMAL /HPF (ref 0–5)
GLUCOSE BLD-MCNC: 153 MG/DL (ref 70–99)
GLUCOSE BLD-MCNC: 160 MG/DL (ref 70–99)
GLUCOSE BLD-MCNC: 167 MG/DL (ref 70–99)
GLUCOSE BLD-MCNC: 220 MG/DL (ref 70–99)
GLUCOSE BLD-MCNC: 268 MG/DL (ref 70–99)
GLUCOSE URINE: NEGATIVE MG/DL
KETONES, URINE: NEGATIVE MG/DL
LEUKOCYTE ESTERASE, URINE: ABNORMAL
MICROSCOPIC EXAMINATION: YES
NITRITE, URINE: NEGATIVE
PERFORMED ON: ABNORMAL
PH UA: 6 (ref 5–8)
PROTEIN UA: 30 MG/DL
RBC UA: ABNORMAL /HPF (ref 0–4)
SARS-COV-2, NAAT: NOT DETECTED
SPECIFIC GRAVITY UA: 1.01 (ref 1–1.03)
TROPONIN: 0.19 NG/ML
TROPONIN: 0.21 NG/ML
URINE REFLEX TO CULTURE: ABNORMAL
URINE TYPE: ABNORMAL
UROBILINOGEN, URINE: 0.2 E.U./DL
WBC UA: ABNORMAL /HPF (ref 0–5)

## 2021-05-04 PROCEDURE — 93010 ELECTROCARDIOGRAM REPORT: CPT | Performed by: INTERNAL MEDICINE

## 2021-05-04 PROCEDURE — 2700000000 HC OXYGEN THERAPY PER DAY

## 2021-05-04 PROCEDURE — 6360000002 HC RX W HCPCS: Performed by: HOSPITALIST

## 2021-05-04 PROCEDURE — 2060000000 HC ICU INTERMEDIATE R&B

## 2021-05-04 PROCEDURE — 83036 HEMOGLOBIN GLYCOSYLATED A1C: CPT

## 2021-05-04 PROCEDURE — 84484 ASSAY OF TROPONIN QUANT: CPT

## 2021-05-04 PROCEDURE — 99223 1ST HOSP IP/OBS HIGH 75: CPT | Performed by: INTERNAL MEDICINE

## 2021-05-04 PROCEDURE — 51702 INSERT TEMP BLADDER CATH: CPT

## 2021-05-04 PROCEDURE — 6370000000 HC RX 637 (ALT 250 FOR IP): Performed by: INTERNAL MEDICINE

## 2021-05-04 PROCEDURE — 36591 DRAW BLOOD OFF VENOUS DEVICE: CPT

## 2021-05-04 PROCEDURE — 94761 N-INVAS EAR/PLS OXIMETRY MLT: CPT

## 2021-05-04 PROCEDURE — 94640 AIRWAY INHALATION TREATMENT: CPT

## 2021-05-04 PROCEDURE — 2580000003 HC RX 258: Performed by: HOSPITALIST

## 2021-05-04 PROCEDURE — 6370000000 HC RX 637 (ALT 250 FOR IP): Performed by: HOSPITALIST

## 2021-05-04 PROCEDURE — 99222 1ST HOSP IP/OBS MODERATE 55: CPT | Performed by: INTERNAL MEDICINE

## 2021-05-04 RX ORDER — POLYETHYLENE GLYCOL 3350 17 G/17G
17 POWDER, FOR SOLUTION ORAL DAILY PRN
Status: DISCONTINUED | OUTPATIENT
Start: 2021-05-04 | End: 2021-05-08 | Stop reason: HOSPADM

## 2021-05-04 RX ORDER — NITROGLYCERIN 0.4 MG/1
0.4 TABLET SUBLINGUAL EVERY 5 MIN PRN
Status: DISCONTINUED | OUTPATIENT
Start: 2021-05-04 | End: 2021-05-08 | Stop reason: HOSPADM

## 2021-05-04 RX ORDER — CYCLOBENZAPRINE HCL 10 MG
10 TABLET ORAL 2 TIMES DAILY PRN
Status: DISCONTINUED | OUTPATIENT
Start: 2021-05-04 | End: 2021-05-08 | Stop reason: HOSPADM

## 2021-05-04 RX ORDER — SODIUM CHLORIDE 0.9 % (FLUSH) 0.9 %
5-40 SYRINGE (ML) INJECTION PRN
Status: DISCONTINUED | OUTPATIENT
Start: 2021-05-04 | End: 2021-05-08 | Stop reason: HOSPADM

## 2021-05-04 RX ORDER — CEFDINIR 300 MG/1
300 CAPSULE ORAL 2 TIMES DAILY
Status: DISCONTINUED | OUTPATIENT
Start: 2021-05-04 | End: 2021-05-08 | Stop reason: HOSPADM

## 2021-05-04 RX ORDER — ONDANSETRON 2 MG/ML
4 INJECTION INTRAMUSCULAR; INTRAVENOUS EVERY 6 HOURS PRN
Status: DISCONTINUED | OUTPATIENT
Start: 2021-05-04 | End: 2021-05-08 | Stop reason: HOSPADM

## 2021-05-04 RX ORDER — PROMETHAZINE HYDROCHLORIDE 25 MG/1
25 TABLET ORAL EVERY 6 HOURS PRN
Status: DISCONTINUED | OUTPATIENT
Start: 2021-05-04 | End: 2021-05-08 | Stop reason: HOSPADM

## 2021-05-04 RX ORDER — FUROSEMIDE 10 MG/ML
40 INJECTION INTRAMUSCULAR; INTRAVENOUS 2 TIMES DAILY
Status: DISCONTINUED | OUTPATIENT
Start: 2021-05-04 | End: 2021-05-08 | Stop reason: HOSPADM

## 2021-05-04 RX ORDER — DEXTROSE MONOHYDRATE 50 MG/ML
100 INJECTION, SOLUTION INTRAVENOUS PRN
Status: DISCONTINUED | OUTPATIENT
Start: 2021-05-04 | End: 2021-05-08 | Stop reason: HOSPADM

## 2021-05-04 RX ORDER — EZETIMIBE 10 MG/1
10 TABLET ORAL NIGHTLY
Status: DISCONTINUED | OUTPATIENT
Start: 2021-05-04 | End: 2021-05-08 | Stop reason: HOSPADM

## 2021-05-04 RX ORDER — SODIUM CHLORIDE 0.9 % (FLUSH) 0.9 %
5-40 SYRINGE (ML) INJECTION EVERY 12 HOURS SCHEDULED
Status: DISCONTINUED | OUTPATIENT
Start: 2021-05-04 | End: 2021-05-08 | Stop reason: HOSPADM

## 2021-05-04 RX ORDER — PROMETHAZINE HYDROCHLORIDE 25 MG/1
25 TABLET ORAL EVERY 6 HOURS PRN
Status: DISCONTINUED | OUTPATIENT
Start: 2021-05-04 | End: 2021-05-04 | Stop reason: SDUPTHER

## 2021-05-04 RX ORDER — CETIRIZINE HYDROCHLORIDE 10 MG/1
10 TABLET ORAL DAILY
Status: DISCONTINUED | OUTPATIENT
Start: 2021-05-04 | End: 2021-05-08 | Stop reason: HOSPADM

## 2021-05-04 RX ORDER — ACETAMINOPHEN 650 MG/1
650 SUPPOSITORY RECTAL EVERY 6 HOURS PRN
Status: DISCONTINUED | OUTPATIENT
Start: 2021-05-04 | End: 2021-05-08 | Stop reason: HOSPADM

## 2021-05-04 RX ORDER — ACETAMINOPHEN 325 MG/1
650 TABLET ORAL EVERY 6 HOURS PRN
Status: DISCONTINUED | OUTPATIENT
Start: 2021-05-04 | End: 2021-05-08 | Stop reason: HOSPADM

## 2021-05-04 RX ORDER — FERROUS SULFATE 325(65) MG
325 TABLET ORAL
Status: DISCONTINUED | OUTPATIENT
Start: 2021-05-04 | End: 2021-05-08 | Stop reason: HOSPADM

## 2021-05-04 RX ORDER — POLYETHYLENE GLYCOL 3350 17 G/17G
17 POWDER, FOR SOLUTION ORAL PRN
Status: DISCONTINUED | OUTPATIENT
Start: 2021-05-04 | End: 2021-05-04 | Stop reason: ALTCHOICE

## 2021-05-04 RX ORDER — ASPIRIN 81 MG/1
81 TABLET, CHEWABLE ORAL NIGHTLY
Status: DISCONTINUED | OUTPATIENT
Start: 2021-05-04 | End: 2021-05-08 | Stop reason: HOSPADM

## 2021-05-04 RX ORDER — DEXTROSE MONOHYDRATE 25 G/50ML
12.5 INJECTION, SOLUTION INTRAVENOUS PRN
Status: DISCONTINUED | OUTPATIENT
Start: 2021-05-04 | End: 2021-05-08 | Stop reason: HOSPADM

## 2021-05-04 RX ORDER — METOPROLOL SUCCINATE 50 MG/1
100 TABLET, EXTENDED RELEASE ORAL DAILY
Status: DISCONTINUED | OUTPATIENT
Start: 2021-05-04 | End: 2021-05-08 | Stop reason: HOSPADM

## 2021-05-04 RX ORDER — PANTOPRAZOLE SODIUM 40 MG/1
40 TABLET, DELAYED RELEASE ORAL DAILY
Status: DISCONTINUED | OUTPATIENT
Start: 2021-05-04 | End: 2021-05-08 | Stop reason: HOSPADM

## 2021-05-04 RX ORDER — BUDESONIDE AND FORMOTEROL FUMARATE DIHYDRATE 160; 4.5 UG/1; UG/1
2 AEROSOL RESPIRATORY (INHALATION) 2 TIMES DAILY
Status: DISCONTINUED | OUTPATIENT
Start: 2021-05-04 | End: 2021-05-08 | Stop reason: HOSPADM

## 2021-05-04 RX ORDER — ASPIRIN 81 MG/1
81 TABLET, CHEWABLE ORAL ONCE
Status: COMPLETED | OUTPATIENT
Start: 2021-05-04 | End: 2021-05-04

## 2021-05-04 RX ORDER — OXYCODONE HYDROCHLORIDE 5 MG/1
5 TABLET ORAL EVERY 6 HOURS PRN
Status: DISCONTINUED | OUTPATIENT
Start: 2021-05-04 | End: 2021-05-08 | Stop reason: HOSPADM

## 2021-05-04 RX ORDER — INSULIN GLARGINE 100 [IU]/ML
55 INJECTION, SOLUTION SUBCUTANEOUS NIGHTLY
Status: DISCONTINUED | OUTPATIENT
Start: 2021-05-04 | End: 2021-05-05

## 2021-05-04 RX ORDER — INSULIN GLARGINE 100 [IU]/ML
55 INJECTION, SOLUTION SUBCUTANEOUS ONCE
Status: COMPLETED | OUTPATIENT
Start: 2021-05-04 | End: 2021-05-04

## 2021-05-04 RX ORDER — NICOTINE POLACRILEX 4 MG
15 LOZENGE BUCCAL PRN
Status: DISCONTINUED | OUTPATIENT
Start: 2021-05-04 | End: 2021-05-08 | Stop reason: HOSPADM

## 2021-05-04 RX ORDER — SODIUM CHLORIDE 9 MG/ML
25 INJECTION, SOLUTION INTRAVENOUS PRN
Status: DISCONTINUED | OUTPATIENT
Start: 2021-05-04 | End: 2021-05-08 | Stop reason: HOSPADM

## 2021-05-04 RX ADMIN — ASPIRIN 81 MG: 81 TABLET, CHEWABLE ORAL at 03:47

## 2021-05-04 RX ADMIN — Medication 10 ML: at 21:21

## 2021-05-04 RX ADMIN — Medication 2 PUFF: at 20:41

## 2021-05-04 RX ADMIN — Medication 2 PUFF: at 06:49

## 2021-05-04 RX ADMIN — INSULIN GLARGINE 55 UNITS: 100 INJECTION, SOLUTION SUBCUTANEOUS at 21:32

## 2021-05-04 RX ADMIN — APIXABAN 5 MG: 5 TABLET, FILM COATED ORAL at 21:21

## 2021-05-04 RX ADMIN — METOPROLOL SUCCINATE 100 MG: 50 TABLET, EXTENDED RELEASE ORAL at 09:03

## 2021-05-04 RX ADMIN — CETIRIZINE HYDROCHLORIDE 10 MG: 10 TABLET ORAL at 09:03

## 2021-05-04 RX ADMIN — INSULIN GLARGINE 55 UNITS: 100 INJECTION, SOLUTION SUBCUTANEOUS at 03:45

## 2021-05-04 RX ADMIN — INSULIN LISPRO 1 UNITS: 100 INJECTION, SOLUTION INTRAVENOUS; SUBCUTANEOUS at 11:49

## 2021-05-04 RX ADMIN — PANTOPRAZOLE SODIUM 40 MG: 40 TABLET, DELAYED RELEASE ORAL at 06:51

## 2021-05-04 RX ADMIN — MAGNESIUM GLUCONATE 500 MG ORAL TABLET 400 MG: 500 TABLET ORAL at 09:03

## 2021-05-04 RX ADMIN — FERROUS SULFATE TAB 325 MG (65 MG ELEMENTAL FE) 325 MG: 325 (65 FE) TAB at 09:03

## 2021-05-04 RX ADMIN — INSULIN LISPRO 2 UNITS: 100 INJECTION, SOLUTION INTRAVENOUS; SUBCUTANEOUS at 17:34

## 2021-05-04 RX ADMIN — CEFDINIR 300 MG: 300 CAPSULE ORAL at 03:47

## 2021-05-04 RX ADMIN — FUROSEMIDE 40 MG: 10 INJECTION, SOLUTION INTRAMUSCULAR; INTRAVENOUS at 17:34

## 2021-05-04 RX ADMIN — MAGNESIUM GLUCONATE 500 MG ORAL TABLET 400 MG: 500 TABLET ORAL at 14:42

## 2021-05-04 RX ADMIN — Medication 10 ML: at 09:03

## 2021-05-04 RX ADMIN — FUROSEMIDE 40 MG: 10 INJECTION, SOLUTION INTRAMUSCULAR; INTRAVENOUS at 09:04

## 2021-05-04 RX ADMIN — APIXABAN 5 MG: 5 TABLET, FILM COATED ORAL at 09:03

## 2021-05-04 RX ADMIN — INSULIN LISPRO 1 UNITS: 100 INJECTION, SOLUTION INTRAVENOUS; SUBCUTANEOUS at 09:11

## 2021-05-04 RX ADMIN — ASPIRIN 81 MG: 81 TABLET, CHEWABLE ORAL at 21:21

## 2021-05-04 RX ADMIN — APIXABAN 5 MG: 5 TABLET, FILM COATED ORAL at 03:46

## 2021-05-04 RX ADMIN — MAGNESIUM GLUCONATE 500 MG ORAL TABLET 400 MG: 500 TABLET ORAL at 21:21

## 2021-05-04 RX ADMIN — CEFDINIR 300 MG: 300 CAPSULE ORAL at 21:21

## 2021-05-04 RX ADMIN — CEFDINIR 300 MG: 300 CAPSULE ORAL at 09:03

## 2021-05-04 ASSESSMENT — PAIN SCALES - GENERAL
PAINLEVEL_OUTOF10: 0
PAINLEVEL_OUTOF10: 3

## 2021-05-04 ASSESSMENT — PAIN DESCRIPTION - LOCATION: LOCATION: CHEST;SHOULDER;NECK

## 2021-05-04 ASSESSMENT — PAIN DESCRIPTION - DESCRIPTORS: DESCRIPTORS: STABBING;BURNING

## 2021-05-04 ASSESSMENT — PAIN DESCRIPTION - PAIN TYPE: TYPE: CHRONIC PAIN

## 2021-05-04 NOTE — CONSULTS
[address]                 [address] [place] [address]                                  CONSULTATION    PATIENT NAME: Corinne Kirk                   :        1965  MED REC NO:   [id number]                          ROOM:         ACCOUNT NO:   [de-identified]                           ADMIT DATE: 2021  PROVIDER:     Michelle Enriquez MD    CONSULT DATE:  2021    REASON FOR CONSULTATION:  Shortness of breath, congestive heart failure,  and elevated troponin. HISTORY OF PRESENT ILLNESS:  A 77-year-old male who is followed by [facility]  Cardiology, presented to the hospital with complaints of shortness of  breath. He has chronic shortness of breath and it has gotten much worse  over the last several days. He has associated weight gain and increased  swelling. He has had similar symptoms in the past.  He feels a little  bit better since he has been here in the hospital with diuresis. No  associated chest pain. Symptoms had been constant, getting  progressively worse. Symptoms are present at rest.    PAST MEDICAL HISTORY:  1. Coronary artery disease. He is followed by [facility] cardiology. He  underwent high risk angioplasty in 2017. He has been medically managed  since then. 2.  Ischemic cardiomyopathy. Most recent ejection fraction of 30% to  35%. 3.  Diabetes. 4.  Hyperlipidemia. 5.  Hypertension. 6.  COPD. 7.  History of CVA. 8.  History of pulmonary embolism. 9.  Paralysis. 10. Edema, chronic. 11.  Chronic systolic congestive heart failure. SOCIAL HISTORY:  He does not smoke. FAMILY HISTORY:  Positive for heart disease. REVIEW OF SYSTEMS:  No fever or chills. No cough. No focal neurologic  symptoms. No headache or visual changes. No recent GI or  bleeding. No recent or upcoming surgeries. All other systems are negative expect  as present illness. ALLERGIES:  See list in the chart, which I have reviewed. ischemic evaluation is warranted in the future, also to  consider possibly defibrillator. 4.  Continue aspirin and Eliquis and beta-blocker. 5.  The patient is noted to be statin intolerant. We will add Zetia. Marj Appiah should be discussed with his primary cardiologist as an  outpatient.         Arn Skiff, MD    D: 05/04/2021 11:12:02       T: 05/04/2021 13:09:03     [email or web address]  Job#: [id number]     Doc#: [id number]    CC:

## 2021-05-04 NOTE — CONSULTS
[id number]    SOB  A/C sCHF  ICMP, EF 30-35%  Elev trop  CAD - s/p high risk PCI  10/2017  DM  HLD  HTN  COPD  H/O CVA  H/O PE  Paralysis   Edema - chronic, increased     Diuresis  No statin - intolerant  ASA, Eliquis, BBlk  F/U  cards

## 2021-05-04 NOTE — PLAN OF CARE
Problem: Falls - Risk of:  Goal: Will remain free from falls  Description: Will remain free from falls  Outcome: Ongoing  Goal: Absence of physical injury  Description: Absence of physical injury  Outcome: Ongoing     Problem: Skin Integrity:  Goal: Will show no infection signs and symptoms  Description: Will show no infection signs and symptoms  Outcome: Ongoing  Goal: Absence of new skin breakdown  Description: Absence of new skin breakdown  Outcome: Ongoing     Problem: OXYGENATION/RESPIRATORY FUNCTION  Goal: Patient will maintain patent airway  Outcome: Ongoing  Goal: Patient will achieve/maintain normal respiratory rate/effort  Description: Respiratory rate and effort will be within normal limits for the patient  Outcome: Ongoing     Problem: HEMODYNAMIC STATUS  Goal: Patient has stable vital signs and fluid balance  Outcome: Ongoing     Problem: FLUID AND ELECTROLYTE IMBALANCE  Goal: Fluid and electrolyte balance are achieved/maintained  Outcome: Ongoing     Problem: ACTIVITY INTOLERANCE/IMPAIRED MOBILITY  Goal: Mobility/activity is maintained at optimum level for patient  Outcome: Ongoing [phone]
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
## 2021-05-04 NOTE — PROGRESS NOTES
Patient admitted to room 305 from 1 in ER. Patient oriented to room, call light, bed rails, phone, lights and bathroom. Patient instructed about the schedule of the day including: vital sign frequency, lab draws, possible tests, frequency of MD and staff rounds, daily weights, I &O's and prescribed diet. Active bed alarm in place, patient aware of placement and reason. Active Telemetry box in place, patient aware of placement and reason. Bed locked, in lowest position, side rails up 2/4, call light within reach. Recliner Assessment  Patient is not able to demonstrate the ability to move from a reclining position to an upright position within the recliner due to parapalegia, immobility. 4 Eyes Skin Assessment     The patient is being assess for   Admission    I agree that 2 RN's have performed a thorough Head to Toe Skin Assessment on the patient. ALL assessment sites listed below have been assessed. Areas assessed by both nurses: Krissy Birkenhead and Can Passy   [x]   Head, Face, and Ears   [x]   Shoulders, Back, and Chest, Abdomen  [x]   Arms, Elbows, and Hands   [x]   Coccyx, Sacrum, and Ischium  [x]   Legs, Feet, and Heels        Patient has multiple wounds, active with wound care, pictures taken, right BKA, back thoracic rods noted though skin, excoriation in alin area/groin, redness below left breast, right breast open wound, rash generalized to back/buttock/sides, abrasions and bruising scattered. See Pictures    **SHARE this note so that the co-signing nurse is able to place an eSignature**    Co-signer eSignature: {[email or web address]:[id number]}    Does the Patient have Skin Breakdown?   Yes [facility] WOUND CARE was Initiated documentation include the Alin-wound, Wound Assessment, Measurements, Dressing Treatment, Drainage, and Color\",          Ismael Prevention initiated:  Yes   Wound Care Orders initiated:  Yes      [address]  nurse consulted for Pressure Injury (Stage 3,4, Unstageable, DTI, NWPT, Complex wounds)and New or Established Ostomies:  Yes      Primary Nurse eSignature: Electronically signed by Gail Us RN on 5/4/21 at 6:44 AM EDT

## 2021-05-04 NOTE — CARE COORDINATION
Case Management Assessment  Initial Evaluation      Patient Name: Meghan Munguia  YOB: 1965  Diagnosis: Heart failure ([facility] [I50.9]  Date / Time: 5/3/2021  9:26 PM     Admission status/Date: 05/04/2021 Inpatient   Chart Reviewed: Yes      Patient Interviewed: Yes   Family Interviewed:  No      Hospitalization in the last 30 days:  No      Health Care Decision Maker :   Primary Decision Maker: Chanel Doss - Spouse - [phone]    (CM - must 1st enter selection under Navigator - emergency contact- [address] Relationship and pick relationship)   Who do you trust or have selected to make healthcare decisions for you      Met with: pt   Interview conducted  (bedside/phone): bedside    Current PCP: Dr. Sita Reynaga  Medicare and [address] [place] [address] required for SNF : N          3 night stay required -  N-WAIVED    ADLS  Support Systems/Care Needs: Children, Family Members  Transportation: EMS transportation  -uses [facility] but can't use them from the hospital to home as they don't transport from the hospital to home per pt  Meal Preparation: Family    Housing  Living Arrangements: pt lives at home with his son and daughter. Steps: Ramp  Intent for return to present living arrangements: Yes  Identified Issues: [id number] [facility] with Home Health Care : Yes - [id number] [facility] Agency:(Services)  Type of Home Care Services: Aide Services, Nursing Services  Passport/Waiver : Yes -  :   Pt did not provide          Phone Number:    Passport/Waiver Services:[facility] Monday through Friday from 9-3. He stated that he has been approved for aides in the evening but hasn't been able to find staffing to provide the services in the evening.            Durable Medical Equiptment   DME Provider: n/a  Equipment:   Walker___Cane___RTS___ BSC___Shower Chair___Hospital Bed___W/C__x__Other____Ramp; Kyle Lofton Iift____  02 at ____Liter(s)---wears(frequency)_______ HHN ___ CPAP_x__ BiPap___   N/A____      Home O2 Use :  No    If No for home O2---if presently on O2 during hospitalization:  No  if yes CM to follow for potential DC O2 need  Informed of need for care provider to bring portable home O2 tank on day of discharge for nursing to connect prior to leaving:   Not Indicated  Verbalized agreement/Understanding:   Not Indicated    Community Service Affiliation  Dialysis:  No    · Agency:  · Location:  · Dialysis Schedule:  · Phone:   · Fax: Other Community Services: Outpatient Wound Care with Dr. Temitope Rodas: Explained Case Management role/services. Chart review completed. Met with pt at bedside. Pt stated that he plans on returning home when discharged with resumption of his current services at home. He needs total care at home. He sated that he wants to use [facility] for transportation when he leaves the hospital as [employer] transport from hospital to home. Pt denied needs or concerns for CM at this time. Spoke with Neva Wilkerson with [facility] stated she will confirm pt is active with them and can accept pt back if so. She stated she will call CM if pt not active or if there is an issue. CM will follow. Please notify CM if needs or concerns arise.
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
## 2021-05-04 NOTE — ACP (ADVANCE CARE PLANNING)
Advance Care Planning   Healthcare Decision Maker:    Primary Decision Maker: Kylie Herring Spouse - [phone]    Click here to complete Healthcare Decision Makers including selection of the Healthcare Decision Maker Relationship (ie \"Primary\"). Pt stated his spouse Ebonie Cason is his primary decision maker if he is unable to make his medical decisions. He stated she is his POA. Requested copies of POA to have on file and he stated agreement.  He also stated that the hospital should already have copies
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
## 2021-05-05 LAB
ANION GAP SERPL CALCULATED.3IONS-SCNC: 10 MMOL/L (ref 3–16)
BASOPHILS ABSOLUTE: 0.1 K/UL (ref 0–0.2)
BASOPHILS RELATIVE PERCENT: 0.8 %
BUN BLDV-MCNC: 78 MG/DL (ref 7–20)
CALCIUM SERPL-MCNC: 9 MG/DL (ref 8.3–10.6)
CHLORIDE BLD-SCNC: 97 MMOL/L (ref 99–110)
CO2: 28 MMOL/L (ref 21–32)
CREAT SERPL-MCNC: 1.9 MG/DL (ref 0.9–1.3)
EOSINOPHILS ABSOLUTE: 0.2 K/UL (ref 0–0.6)
EOSINOPHILS RELATIVE PERCENT: 1.9 %
ESTIMATED AVERAGE GLUCOSE: 208.7 MG/DL
GFR AFRICAN AMERICAN: 45
GFR NON-AFRICAN AMERICAN: 37
GLUCOSE BLD-MCNC: 233 MG/DL (ref 70–99)
GLUCOSE BLD-MCNC: 262 MG/DL (ref 70–99)
GLUCOSE BLD-MCNC: 303 MG/DL (ref 70–99)
GLUCOSE BLD-MCNC: 319 MG/DL (ref 70–99)
GLUCOSE BLD-MCNC: 412 MG/DL (ref 70–99)
GLUCOSE BLD-MCNC: 420 MG/DL (ref 70–99)
HBA1C MFR BLD: 8.9 %
HCT VFR BLD CALC: 36 % (ref 40.5–52.5)
HEMOGLOBIN: 11.1 G/DL (ref 13.5–17.5)
LYMPHOCYTES ABSOLUTE: 0.9 K/UL (ref 1–5.1)
LYMPHOCYTES RELATIVE PERCENT: 11.4 %
MCH RBC QN AUTO: 23.9 PG (ref 26–34)
MCHC RBC AUTO-ENTMCNC: 31 G/DL (ref 31–36)
MCV RBC AUTO: 77.3 FL (ref 80–100)
MONOCYTES ABSOLUTE: 0.8 K/UL (ref 0–1.3)
MONOCYTES RELATIVE PERCENT: 9.9 %
NEUTROPHILS ABSOLUTE: 6.1 K/UL (ref 1.7–7.7)
NEUTROPHILS RELATIVE PERCENT: 76 %
PDW BLD-RTO: 19.6 % (ref 12.4–15.4)
PERFORMED ON: ABNORMAL
PLATELET # BLD: 217 K/UL (ref 135–450)
PMV BLD AUTO: 8.9 FL (ref 5–10.5)
POTASSIUM REFLEX MAGNESIUM: 5 MMOL/L (ref 3.5–5.1)
RBC # BLD: 4.65 M/UL (ref 4.2–5.9)
SODIUM BLD-SCNC: 135 MMOL/L (ref 136–145)
WBC # BLD: 8 K/UL (ref 4–11)

## 2021-05-05 PROCEDURE — 6370000000 HC RX 637 (ALT 250 FOR IP): Performed by: INTERNAL MEDICINE

## 2021-05-05 PROCEDURE — 80048 BASIC METABOLIC PNL TOTAL CA: CPT

## 2021-05-05 PROCEDURE — 6370000000 HC RX 637 (ALT 250 FOR IP): Performed by: HOSPITALIST

## 2021-05-05 PROCEDURE — 94640 AIRWAY INHALATION TREATMENT: CPT

## 2021-05-05 PROCEDURE — 99233 SBSQ HOSP IP/OBS HIGH 50: CPT | Performed by: INTERNAL MEDICINE

## 2021-05-05 PROCEDURE — 2580000003 HC RX 258: Performed by: HOSPITALIST

## 2021-05-05 PROCEDURE — 99232 SBSQ HOSP IP/OBS MODERATE 35: CPT | Performed by: INTERNAL MEDICINE

## 2021-05-05 PROCEDURE — 85025 COMPLETE CBC W/AUTO DIFF WBC: CPT

## 2021-05-05 PROCEDURE — 2700000000 HC OXYGEN THERAPY PER DAY

## 2021-05-05 PROCEDURE — 6360000002 HC RX W HCPCS: Performed by: HOSPITALIST

## 2021-05-05 PROCEDURE — 2060000000 HC ICU INTERMEDIATE R&B

## 2021-05-05 PROCEDURE — 94761 N-INVAS EAR/PLS OXIMETRY MLT: CPT

## 2021-05-05 RX ORDER — INSULIN GLARGINE 100 [IU]/ML
60 INJECTION, SOLUTION SUBCUTANEOUS NIGHTLY
Status: DISCONTINUED | OUTPATIENT
Start: 2021-05-05 | End: 2021-05-08 | Stop reason: HOSPADM

## 2021-05-05 RX ADMIN — FUROSEMIDE 40 MG: 10 INJECTION, SOLUTION INTRAMUSCULAR; INTRAVENOUS at 08:54

## 2021-05-05 RX ADMIN — MAGNESIUM GLUCONATE 500 MG ORAL TABLET 400 MG: 500 TABLET ORAL at 16:17

## 2021-05-05 RX ADMIN — APIXABAN 5 MG: 5 TABLET, FILM COATED ORAL at 08:54

## 2021-05-05 RX ADMIN — MAGNESIUM GLUCONATE 500 MG ORAL TABLET 400 MG: 500 TABLET ORAL at 08:54

## 2021-05-05 RX ADMIN — FERROUS SULFATE TAB 325 MG (65 MG ELEMENTAL FE) 325 MG: 325 (65 FE) TAB at 08:54

## 2021-05-05 RX ADMIN — PANTOPRAZOLE SODIUM 40 MG: 40 TABLET, DELAYED RELEASE ORAL at 06:48

## 2021-05-05 RX ADMIN — CEFDINIR 300 MG: 300 CAPSULE ORAL at 20:35

## 2021-05-05 RX ADMIN — ASPIRIN 81 MG: 81 TABLET, CHEWABLE ORAL at 20:35

## 2021-05-05 RX ADMIN — Medication 10 ML: at 20:36

## 2021-05-05 RX ADMIN — Medication 2 PUFF: at 07:49

## 2021-05-05 RX ADMIN — METOPROLOL SUCCINATE 100 MG: 50 TABLET, EXTENDED RELEASE ORAL at 08:54

## 2021-05-05 RX ADMIN — CEFDINIR 300 MG: 300 CAPSULE ORAL at 08:54

## 2021-05-05 RX ADMIN — APIXABAN 5 MG: 5 TABLET, FILM COATED ORAL at 20:36

## 2021-05-05 RX ADMIN — FUROSEMIDE 40 MG: 10 INJECTION, SOLUTION INTRAMUSCULAR; INTRAVENOUS at 18:45

## 2021-05-05 RX ADMIN — Medication 10 ML: at 08:55

## 2021-05-05 RX ADMIN — INSULIN GLARGINE 60 UNITS: 100 INJECTION, SOLUTION SUBCUTANEOUS at 21:08

## 2021-05-05 RX ADMIN — CETIRIZINE HYDROCHLORIDE 10 MG: 10 TABLET ORAL at 08:54

## 2021-05-05 RX ADMIN — MAGNESIUM GLUCONATE 500 MG ORAL TABLET 400 MG: 500 TABLET ORAL at 20:36

## 2021-05-05 RX ADMIN — Medication 2 PUFF: at 18:57
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
## 2021-05-05 NOTE — PROGRESS NOTES
Shift assessment completed. See flow sheet. Medications given. Patient is A&O x4. Patient denies further needs at this time. Call light within reach. Will continue to monitor.

## 2021-05-05 NOTE — PROGRESS NOTES
Patient NPO after midnight per hospitalist, unknown reason why. Assessment complete. Lung sounds diminished, snack requested. Turned and repositioned, call light in reach. SOB improving, oxygen 2 liters in place.

## 2021-05-05 NOTE — PROGRESS NOTES
Bedside report and transfer of care given to Krissy Brasher Rn. Pt currently resting in bed with the call light within reach. Pt denies any other care needs at this time. Pt stable at this time.

## 2021-05-05 NOTE — CARE COORDINATION
INTERDISCIPLINARY PLAN OF CARE CONFERENCE    Date/Time: 5/5/2021 12:16 PM  Completed by: Mally Hsieh Case Management      Patient Name:  Melany Flores  YOB: 1965  Admitting Diagnosis: Heart failure ([facility] [I50.9]     Admit Date/Time:  5/3/2021  9:26 PM    Chart reviewed. Interdisciplinary team contacted or reviewed plan related to patient progress and discharge plans. Disciplines included Case Management, Nursing, and Dietitian. Current Status:ongoing  PT/OT recommendation for discharge plan of care: n/a    Expected D/C Disposition:  Home  Confirmed plan with patient and/or family Yes confirmed with: (name) pt  Met with:pt  Discharge Plan Comments: Reviewed chart. Role of discharge planner explained and patient verbalized understanding. Pt is from home with family and plans to return. Pt wants [facility] at discharge. Pt also wants [facility] for transport home if possible.    Pt is active with the [address] O2 in place on admit: No  Pt informed of need to bring portable home O2 tank on day of discharge for nursing to connect prior to leaving:  Not Indicated  Verbalized agreement/Understanding:  Not Indicated

## 2021-05-05 NOTE — PROGRESS NOTES
Nutrition Assessment     Type and Reason for Visit: Initial(poor PO and wounds)    Nutrition Recommendations/Plan:   1. Adjusted diet to CCC-3; 2gr Na; 2L FR  2. Added Ensure HP TID     Nutrition Assessment:    Pt. nutritionally compromised AEB he has multiple wounds that are stage 2, as well as hardware that is exposed and abscesses; he is a parapelegic form mid chest and below. At risk for further nutrition compromise r/t currently being treated for an infection and CHF . Will adjust diet and add ONS tid    Estimated Daily Nutrient Needs:  Energy (kcal): 5581-4447 based ~ 15-18 kcal/kg cbw; Weight Used for Energy Requirements:  Adjusted     Protein (g):  based ~ 1.2-1.4 gr/kg aIBW; Weight Used for Protein Requirements:  Adjusted        Fluid (ml/day): 0543-7682 (CHF); Weight Used for Fluid Requirements:  Other (Comment)([facility])      Nutrition Related Findings: midled age male sittig up in bed with drinks at bedside; denies poor appetite; does have multiple wounds whih he states are dramatically improved form stage 4's to stage 2's; diet at home is very good and he does try to increase protein, controls carbs, sodium and fluids;illeostomy functioning; BS are running high being treated for an infection;      Current Nutrition Therapies:    DIET CARB CONTROL; Carb Control: 3 carb choices (45 gms)/meal; Low Sodium (2 GM);  Daily Fluid Restriction: 2000 ml  Dietary Nutrition Supplements: Low Calorie High Protein Supplement    Anthropometric Measures:  · Height: 6' 2\" (188 cm)  · Current Body Wt: 278 lb (126.1 kg)   · BMI: 35.7    Nutrition Diagnosis:   · Increased nutrient needs(wound helaing) related to inadequate protein-energy intake as evidenced by wounds      Nutrition Interventions:   Food and/or Nutrient Delivery:  Modify Current Diet, Start Oral Nutrition Supplement  Nutrition Education/Counseling:  No recommendation at this time   Coordination of Nutrition Care:  Continue to monitor while inpatient    Goals:  pt will conatinue to consume > 75% of meals and ensure HP       Nutrition Monitoring and Evaluation:   Behavioral-Environmental Outcomes:      Food/Nutrient Intake Outcomes:  Food and Nutrient Intake, Supplement Intake  Physical Signs/Symptoms Outcomes:  Biochemical Data, Fluid Status or Edema, Weight     Discharge Planning:     Too soon to determine     Electronically signed by Danielle Joseph RD, LD on 5/5/21 at 7:15 PM EDT    Contact: [phone]

## 2021-05-05 NOTE — PROGRESS NOTES
CARDIOLOGY PROGRESS NOTE      Patient Name: Jacoby Avila  Date of admission: 5/3/2021  9:26 PM  Admission Dx: Heart failure ([address] [I50.9]  Reason for Consult: Shortness of breath, congestive heart failure, elevated troponin  Requesting Physician: Tasia Varela MD  Primary Care physician: Joann Dao MD    Subjective:     Jacoby Avila is a 50 y.o. patient with prior history notable for ischemic cardiomyopathy with heart failure with reduced ejection fraction-EF 30 to 35%, coronary artery disease with high risk angioplasty 2017, followed by [facility] cardiology, diabetes, hypertension, hyperlipidemia, history of cerebrovascular accident, pulmonary embolism, who presented to the hospital with complaints of worsening shortness of breath. Patient was initially evaluated by my partner Dr. Magaly Garg yesterday. Noted to be in acute exacerbation of congestive heart failure. He was feeling a bit better after initiation of IV diuresis. Initial creatinine was noted to be 2.4 from baseline 1.0 3/16/2021, hyponatremic 132, proBNP over 16,000, troponin 0 0.19 > 0.21 >0.19, hemoglobin 11.2. With initiation of diuresis creatinine has improved today to 1.9. EKG showed normal sinus rhythm with PACs, prior inferior and anterolateral infarcts, low voltage precordial leads. Patient has been receiving IV Lasix 40 mg twice daily. Patient well known to me as i've evaluated him prior. Today, he notes worsening edema/dyspnea at home prior to presentation. Particularly edematous hips/firm abdomen. He has had mild chest pressure but none of his \"heart pain\" that he has had prior. Sleeping HOB elevated. Remains on 1L NC. Notes compliance with GDMT since entresto started January. Home Medications:  Were reviewed and are listed in nursing record and/or below  Prior to Admission medications    Medication Sig Start Date End Date Taking?  Authorizing Provider   cefdinir (OMNICEF) 300 MG capsule Take 1 capsule by mouth 2 times daily for 10 days 4/29/21 5/9/21 Yes Ellie Thomas MD   fluticasone-vilanterol (BREO ELLIPTA) 100-25 MCG/INH AEPB inhaler Inhale 2 puffs into the lungs daily  Patient taking differently: Inhale 1 puff into the lungs 2 times daily  4/27/21  Yes Romana Starks MD   sacubitril-valsartan (ENTRESTO) 24-26 MG per tablet Take 1 tablet by mouth 2 times daily   Yes Historical Provider, MD   pantoprazole (PROTONIX) 40 MG tablet TAKE ONE TABLET BY MOUTH DAILY 4/13/21  Yes NONI Malone   metoprolol succinate (TOPROL XL) 100 MG extended release tablet TAKE ONE TABLET BY MOUTH DAILY  Patient taking differently: nightly TAKE ONE TABLET BY MOUTH DAILY 4/9/21  Yes Romana Starks MD   apixaban (ELIQUIS) 5 MG TABS tablet TAKE ONE TABLET BY MOUTH TWICE A DAY 3/19/21  Yes Romana Starks MD   torsemide (DEMADEX) 10 MG tablet Take 40 mg by mouth daily   Yes Historical Provider, MD   vitamin D (ERGOCALCIFEROL) 1.25 MG (47452 UT) CAPS capsule Take 1 capsule by mouth once a week  Patient taking differently: Take 50,000 Units by mouth once a week Mondays 1/28/21  Yes Romana Starks MD   magnesium oxide (MAG-OX) 400 (241.3 Mg) MG TABS tablet TAKE 3 TABLETS BY MOUTH EVERY MORNING AND TAKE 3 TABLETS BY MOUTH EVERY EVENING 1/27/21  Yes Saadia Dutta MD   metFORMIN (GLUCOPHAGE) 1000 MG tablet Take 1 tablet by mouth 2 times daily (with meals) 1/26/21  Yes Romana Starks MD   traZODone (DESYREL) 50 MG tablet TAKE ONE TABLET BY MOUTH ONCE NIGHTLY 1/26/21  Yes Romana Starks MD   insulin lispro (HUMALOG) 100 UNIT/ML injection vial INJECT 22 UNITS UNDER THE SKIN THREE TIMES A DAY BEFORE MEALS WITH SLIDING SCALE  Patient taking differently: INJECT 20 UNITS UNDER THE SKIN THREE TIMES A DAY BEFORE MEALS WITH SLIDING SCALE 1/26/21  Yes Romana Starks MD   insulin glargine (LANTUS) 100 UNIT/ML injection vial INJECT 55 UNITS UNDER THE SKIN TWO TIMES A DAY  Patient taking differently: INJECT 60 UNITS UNDER THE SKIN TWO TIMES A DAY 1/26/21  Yes Mahi Nielsen MD   promethazine (PHENERGAN) 25 MG tablet Take 1 tablet by mouth every 6 hours as needed for Nausea 1/26/21  Yes Mahi Nielsen MD   polyethylene glycol [facility]) 17 GM/SCOOP powder Take 1 scoop daily. Patient taking differently: as needed Take 1 scoop daily. 9/4/20  Yes Mahi Nielsen MD   senna (SENNA-LAX) 8.6 MG tablet TAKE TWO TABLETS BY MOUTH DAILY 8/28/20  Yes Mahi Nielsen MD   docusate sodium (STOOL SOFTENER) 100 MG capsule TAKE TWO CAPSULES BY MOUTH DAILY 8/28/20  Yes Mahi Nielsen MD   Alirocumab (PRALUENT) 150 MG/ML SOAJ Inject 150 mg into the skin every 14 days Due to take on 5/5. 7/21/20  Yes Historical Provider, MD   ferrous sulfate (IRON 325) 325 (65 Fe) MG tablet TAKE ONE TABLET BY MOUTH DAILY WITH BREAKFAST 5/15/20  Yes Mahi Nielsen MD   nitroGLYCERIN (NITROSTAT) 0.4 MG SL tablet up to max of 3 total doses. If no relief after 1 dose, call 911. 5/15/20  Yes Mahi Nielsen MD   cetirizine (ZYRTEC) 10 MG tablet TAKE ONE TABLET BY MOUTH DAILY 12/11/19  Yes Mahi Nielsen MD   nystatin (MYCOSTATIN) 662375 UNIT/GM powder Mix with your zinc oxide paste, apply to groin rash 3 times daily as needed. 10/10/19  Yes Weston Vega MD   aspirin 81 MG tablet Take 81 mg by mouth nightly  10/16/17  Yes Historical Provider, MD   cyclobenzaprine (FLEXERIL) 10 MG tablet Take 1 tablet by mouth 2 times daily as needed for Muscle spasms 1/19/17  Yes Sue Carmichael MD   Insulin Syringe-Needle U-100 ([employer] INSULIN SYRINGE) 31G X 5/16\" 1 ML MISC 1 each by Does not apply route daily 4/13/21   NONI Sanders   Insulin Syringe-Needle U-100 ([employer] INSULIN SYRINGE) 31G X 5/16\" 0.5 ML MISC Use 4 times daily. DX: E11.9 1/30/20   Mahi Nielsen MD   Insulin Pen Needle ([employer] PEN NEEDLES 31G) 31G X 8 MM MISC Use with Humalog.   DX:E11.9 12/17/19   Mahi Nielsen MD   blood glucose test strips (ONETOUCH VERIO) strip Use to test five times daily. DX:E11.9 10/1/19   Ashlyn Patel MD        CURRENT Medications:  apixaban (ELIQUIS) tablet 5 mg, BID  aspirin chewable tablet 81 mg, Nightly  cefdinir (OMNICEF) capsule 300 mg, BID  cetirizine (ZYRTEC) tablet 10 mg, Daily  cyclobenzaprine (FLEXERIL) tablet 10 mg, BID PRN  ferrous sulfate (IRON 325) tablet 325 mg, Daily with breakfast  budesonide-formoterol (SYMBICORT) 160-4.5 MCG/ACT inhaler 2 puff, BID  insulin glargine (LANTUS) injection vial 55 Units, Nightly  magnesium oxide (MAG-OX) tablet 400 mg, TID  metoprolol succinate (TOPROL XL) extended release tablet 100 mg, Daily  nitroGLYCERIN (NITROSTAT) SL tablet 0.4 mg, Q5 Min PRN  oxyCODONE (ROXICODONE) immediate release tablet 5 mg, Q6H PRN  pantoprazole (PROTONIX) tablet 40 mg, Daily  furosemide (LASIX) injection 40 mg, BID  sodium chloride flush 0.9 % injection 5-40 mL, 2 times per day  sodium chloride flush 0.9 % injection 5-40 mL, PRN  0.9 % sodium chloride infusion, PRN  promethazine (PHENERGAN) tablet 25 mg, Q6H PRN    Or  ondansetron (ZOFRAN) injection 4 mg, Q6H PRN  polyethylene glycol (GLYCOLAX) packet 17 g, Daily PRN  acetaminophen (TYLENOL) tablet 650 mg, Q6H PRN    Or  acetaminophen (TYLENOL) suppository 650 mg, Q6H PRN  glucose (GLUTOSE) 40 % oral gel 15 g, PRN  dextrose 50 % IV solution, PRN  glucagon (rDNA) injection 1 mg, PRN  dextrose 5 % solution, PRN  insulin lispro (HUMALOG) injection vial 0-6 Units, TID WC  insulin lispro (HUMALOG) injection vial 0-3 Units, Nightly  ezetimibe (ZETIA) tablet 10 mg, Nightly        Allergies:  Benadryl [diphenhydramine hcl], Statins [statins], Cephalexin, Morphine, Penicillins, and Sulfa antibiotics     Review of Systems:   A 14 point review of symptoms completed. Pertinent positives identified in the HPI, all other review of symptoms negative.        Objective:     Vitals:    05/04/21 1622 05/04/21 2017 05/04/21 2041 05/05/21 0246 BUN 78 05/05/2021    CREATININE 1.9 05/05/2021    GFRAA 45 05/05/2021    GFRAA >60 05/21/2013    AGRATIO 0.8 05/03/2021    LABGLOM 37 05/05/2021    GLUCOSE 262 05/05/2021    PROT 7.4 05/03/2021    PROT 8.0 10/04/2011    CALCIUM 9.0 05/05/2021    BILITOT 0.4 05/03/2021    ALKPHOS 203 05/03/2021    AST 22 05/03/2021    ALT 25 05/03/2021     PT/INR:  No results found for: PTINR  HgBA1c:  Lab Results   Component Value Date    LABA1C 9.2 03/05/2021     Lab Results   Component Value Date    CKTOTAL 29 (L) 08/20/2018    CKMB <0.2 09/01/2010    TROPONINI 0.19 (H) 05/04/2021         Interval Testing/Data:     Telemetry personally reviewed. NSR with PAC\"s, occasional blocked PAC's, 70-80's, no significant arrhythmia    EKGs personally reviewed. Echo 3/8/21  Conclusions      Summary   Technically difficult examination. The left ventricular systolic function is moderately reduced with an   ejection fraction of 30-35 %. Definity contrast administered with no evidence of left ventricular mass or   thrombus noted. Moderate global hypokinesis with regional variation. Left ventricular diastolic filling pressure are indeterminate. The right ventricle is normal in size with decreased function. Trace mitral and pulmonic regurgitation. Last echo on 5/16/2019 showed EF 30-35%. Ascending aorta is mildly dilated at 4.0cm. Impression and Plan      1. Acute on chronic exacerbation of congestive heart failure with reduced ejection fraction, last EF 30-35%. 2.  Ischemic heart disease/coronary artery disease status post prior PCI  3. Elevated troponins in setting of volume overload, decreased clearance   4. Acute kidney injury, improving  5. Occasional PAC's with block by tele   6. Diabetes mellitus  7. Hypertension  8. Hyperlipidemia  9. CVA  10. Prior VTE on AC  11. Hyperkalemia  12.  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)    Cutaneous candidiasis    Iron deficiency anemia due to chronic blood loss    Lymphedema of both lower extremities    Skin ulcer of left great toe, limited to breakdown of skin (HCC)    Neurogenic bladder    Neurogenic bowel    Chronic osteomyelitis (HCC)    Hypergranulation    Dehiscence of surgical wound of T-spine    Onychomycosis    Dystrophic nail    Ischemic cardiomyopathy    Gitelman syndrome    Acute on chronic systolic congestive heart failure (HCC)    LIBORIO on CPAP    Diabetes mellitus (HCC)    Hyperglycemia due to diabetes mellitus (HCC)    Type 2 diabetes mellitus with diabetic peripheral angiopathy without gangrene, with long-term current use of insulin (HCC)    Ulcer of chest wall with fat layer exposed, following recent abscess    Ulcer of right thigh, limited to breakdown of skin (HCC)    Chronic cough    Abscess of chest wall (left side)    Heart failure ([facility] [address]       PLAN:  1. Continue IV Lasix twice daily and monitor daily BMPs  Strict I/O with external/internal catheter placement to accomplish this on case by case basis, daily standing weights, low salt/caridac diet, and CHF education recommended and discussed with the patient to guide our therapy in the inpatient setting. 2.  Continue baby aspirin daily, Zetia daily; Patient has not tolerated statins  3. Continue metoprolol 100 XL daily for management of coronary artery disease and cardiomyopathy. 4.  Holding Entresto for acute kidney injury  5. Monitor tele    No ischemic evaluation needed this admission. Plan for repeat echo in June following 3 months GDMT with BB and entresto assuming we can get this restarted on renal improvement and should K tolerate.      We will follow. I will address the patient's cardiac risk factors and adjusted pharmacologic treatment as needed. In addition, I have reinforced the need for patient directed risk factor modification. All questions and concerns were addressed to the patient/family. Alternatives to my treatment were discussed. Thank you for allowing us to participate in the care of Luis Daniel Damon. Please call me with any questions [phone].     Mauri Albert MD, [facility]  Cardiovascular Disease  [place] [address]  [phone] [facility]  [phone] [address] [place] [address]  5/5/2021 8:11 AM

## 2021-05-06 LAB
ANION GAP SERPL CALCULATED.3IONS-SCNC: 13 MMOL/L (ref 3–16)
BUN BLDV-MCNC: 72 MG/DL (ref 7–20)
CALCIUM SERPL-MCNC: 8.7 MG/DL (ref 8.3–10.6)
CHLORIDE BLD-SCNC: 93 MMOL/L (ref 99–110)
CO2: 27 MMOL/L (ref 21–32)
CREAT SERPL-MCNC: 1.6 MG/DL (ref 0.9–1.3)
GFR AFRICAN AMERICAN: 55
GFR NON-AFRICAN AMERICAN: 45
GLUCOSE BLD-MCNC: 234 MG/DL (ref 70–99)
GLUCOSE BLD-MCNC: 236 MG/DL (ref 70–99)
GLUCOSE BLD-MCNC: 244 MG/DL (ref 70–99)
GLUCOSE BLD-MCNC: 268 MG/DL (ref 70–99)
GLUCOSE BLD-MCNC: 326 MG/DL (ref 70–99)
GLUCOSE BLD-MCNC: 337 MG/DL (ref 70–99)
MAGNESIUM: 1.9 MG/DL (ref 1.8–2.4)
PERFORMED ON: ABNORMAL
POTASSIUM SERPL-SCNC: 4.5 MMOL/L (ref 3.5–5.1)
SODIUM BLD-SCNC: 133 MMOL/L (ref 136–145)

## 2021-05-06 PROCEDURE — 99232 SBSQ HOSP IP/OBS MODERATE 35: CPT | Performed by: NURSE PRACTITIONER

## 2021-05-06 PROCEDURE — 83735 ASSAY OF MAGNESIUM: CPT

## 2021-05-06 PROCEDURE — 6370000000 HC RX 637 (ALT 250 FOR IP): Performed by: INTERNAL MEDICINE

## 2021-05-06 PROCEDURE — 6360000002 HC RX W HCPCS: Performed by: HOSPITALIST

## 2021-05-06 PROCEDURE — 94640 AIRWAY INHALATION TREATMENT: CPT

## 2021-05-06 PROCEDURE — 94761 N-INVAS EAR/PLS OXIMETRY MLT: CPT

## 2021-05-06 PROCEDURE — 2580000003 HC RX 258: Performed by: HOSPITALIST

## 2021-05-06 PROCEDURE — 99232 SBSQ HOSP IP/OBS MODERATE 35: CPT | Performed by: INTERNAL MEDICINE

## 2021-05-06 PROCEDURE — 80048 BASIC METABOLIC PNL TOTAL CA: CPT

## 2021-05-06 PROCEDURE — 2700000000 HC OXYGEN THERAPY PER DAY

## 2021-05-06 PROCEDURE — 6370000000 HC RX 637 (ALT 250 FOR IP): Performed by: HOSPITALIST

## 2021-05-06 PROCEDURE — 2060000000 HC ICU INTERMEDIATE R&B

## 2021-05-06 RX ORDER — INSULIN GLARGINE 100 [IU]/ML
40 INJECTION, SOLUTION SUBCUTANEOUS
Status: DISCONTINUED | OUTPATIENT
Start: 2021-05-06 | End: 2021-05-08 | Stop reason: HOSPADM

## 2021-05-06 RX ORDER — INSULIN GLARGINE 100 [IU]/ML
20 INJECTION, SOLUTION SUBCUTANEOUS EVERY MORNING
Status: DISCONTINUED | OUTPATIENT
Start: 2021-05-06 | End: 2021-05-06

## 2021-05-06 RX ADMIN — INSULIN LISPRO 4 UNITS: 100 INJECTION, SOLUTION INTRAVENOUS; SUBCUTANEOUS at 12:17

## 2021-05-06 RX ADMIN — Medication 2 PUFF: at 07:25

## 2021-05-06 RX ADMIN — METOPROLOL SUCCINATE 100 MG: 50 TABLET, EXTENDED RELEASE ORAL at 08:51

## 2021-05-06 RX ADMIN — EZETIMIBE 10 MG: 10 TABLET ORAL at 21:45

## 2021-05-06 RX ADMIN — PANTOPRAZOLE SODIUM 40 MG: 40 TABLET, DELAYED RELEASE ORAL at 08:15

## 2021-05-06 RX ADMIN — CEFDINIR 300 MG: 300 CAPSULE ORAL at 21:45

## 2021-05-06 RX ADMIN — FUROSEMIDE 40 MG: 10 INJECTION, SOLUTION INTRAMUSCULAR; INTRAVENOUS at 17:57

## 2021-05-06 RX ADMIN — Medication 2 PUFF: at 20:27

## 2021-05-06 RX ADMIN — MAGNESIUM GLUCONATE 500 MG ORAL TABLET 400 MG: 500 TABLET ORAL at 21:45

## 2021-05-06 RX ADMIN — MAGNESIUM GLUCONATE 500 MG ORAL TABLET 400 MG: 500 TABLET ORAL at 08:51

## 2021-05-06 RX ADMIN — ACETAMINOPHEN 650 MG: 325 TABLET ORAL at 14:38

## 2021-05-06 RX ADMIN — APIXABAN 5 MG: 5 TABLET, FILM COATED ORAL at 21:45

## 2021-05-06 RX ADMIN — CETIRIZINE HYDROCHLORIDE 10 MG: 10 TABLET ORAL at 08:51

## 2021-05-06 RX ADMIN — FUROSEMIDE 40 MG: 10 INJECTION, SOLUTION INTRAMUSCULAR; INTRAVENOUS at 08:51

## 2021-05-06 RX ADMIN — APIXABAN 5 MG: 5 TABLET, FILM COATED ORAL at 08:51

## 2021-05-06 RX ADMIN — INSULIN GLARGINE 40 UNITS: 100 INJECTION, SOLUTION SUBCUTANEOUS at 09:00

## 2021-05-06 RX ADMIN — INSULIN GLARGINE 60 UNITS: 100 INJECTION, SOLUTION SUBCUTANEOUS at 21:46

## 2021-05-06 RX ADMIN — Medication 10 ML: at 21:45

## 2021-05-06 RX ADMIN — CEFDINIR 300 MG: 300 CAPSULE ORAL at 08:51

## 2021-05-06 RX ADMIN — INSULIN LISPRO 4 UNITS: 100 INJECTION, SOLUTION INTRAVENOUS; SUBCUTANEOUS at 18:00

## 2021-05-06 RX ADMIN — FERROUS SULFATE TAB 325 MG (65 MG ELEMENTAL FE) 325 MG: 325 (65 FE) TAB at 08:51

## 2021-05-06 RX ADMIN — MAGNESIUM GLUCONATE 500 MG ORAL TABLET 400 MG: 500 TABLET ORAL at 14:38

## 2021-05-06 RX ADMIN — ASPIRIN 81 MG: 81 TABLET, CHEWABLE ORAL at 21:45

## 2021-05-06 RX ADMIN — OXYCODONE HYDROCHLORIDE 5 MG: 5 TABLET ORAL at 06:13

## 2021-05-06 RX ADMIN — INSULIN LISPRO 4 UNITS: 100 INJECTION, SOLUTION INTRAVENOUS; SUBCUTANEOUS at 09:04

## 2021-05-06 ASSESSMENT — ENCOUNTER SYMPTOMS: SHORTNESS OF BREATH: 1

## 2021-05-06 ASSESSMENT — PAIN SCALES - GENERAL
PAINLEVEL_OUTOF10: 4
PAINLEVEL_OUTOF10: 3

## 2021-05-06 NOTE — PROGRESS NOTES
Oxycodone Hydrochloride 5 mg was pulled for patient on 5-5-21 by this nurse. Patient refused medication. This nurse forgot to return it on 5-5-21. Contacted pharmacy on 5-6-21. Per Pharmacy medication was wasted in the presence of charge Nurse [facility] MEDICINE, RN today 5-6-21.    Electronically signed by Debbie Ayala RN on 5/6/2021 at 5:33 PM     Electronically signed by Christiano Read RN on 5/6/2021 at 5:33PM
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
## 2021-05-06 NOTE — PROGRESS NOTES
Bedside report and transfer of care given to [facility]. Pt currently resting in bed with the call light within reach. Pt denies any other care needs at this time. Pt stable at this time.
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
## 2021-05-06 NOTE — PROGRESS NOTES
BS over 400, lantus changed to 60 units, SS also given, patient asking for Cheetos in home bag. Advised against high carb snack due to blood sugar and educated on effects of elevated sugars. Will recheck at 2 am.  Call light in reach.

## 2021-05-06 NOTE — PROGRESS NOTES
Patient c/o bed being too high, bed not working to go lower, [facility] malfunctioning, lift used to change bed frames, pressure relief mattress changed over to other bed. Repositioned and turned patient in bed. Wound to left chest wall \"burning\" moderate drainage, dressing changed, small hole noted under left breast that patient states came to a head and bursted, has been draining large amounts. New inter dry placed to groin. Patient requests pain medications. AM labs drawn. Call light in reach, bed alarm on, will continue to monitor.

## 2021-05-07 ENCOUNTER — HOSPITAL ENCOUNTER (OUTPATIENT)
Dept: WOUND CARE | Age: 56
Discharge: HOME | End: 2021-05-07
Payer: MEDICARE

## 2021-05-07 LAB
ANION GAP SERPL CALCULATED.3IONS-SCNC: 11 MMOL/L (ref 3–16)
BUN BLDV-MCNC: 66 MG/DL (ref 7–20)
CALCIUM SERPL-MCNC: 9 MG/DL (ref 8.3–10.6)
CHLORIDE BLD-SCNC: 94 MMOL/L (ref 99–110)
CO2: 29 MMOL/L (ref 21–32)
CREAT SERPL-MCNC: 1.5 MG/DL (ref 0.9–1.3)
GFR AFRICAN AMERICAN: 59
GFR NON-AFRICAN AMERICAN: 49
GLUCOSE BLD-MCNC: 201 MG/DL (ref 70–99)
GLUCOSE BLD-MCNC: 213 MG/DL (ref 70–99)
GLUCOSE BLD-MCNC: 222 MG/DL (ref 70–99)
GLUCOSE BLD-MCNC: 226 MG/DL (ref 70–99)
GLUCOSE BLD-MCNC: 294 MG/DL (ref 70–99)
PERFORMED ON: ABNORMAL
POTASSIUM SERPL-SCNC: 4.3 MMOL/L (ref 3.5–5.1)
SODIUM BLD-SCNC: 134 MMOL/L (ref 136–145)

## 2021-05-07 PROCEDURE — 94640 AIRWAY INHALATION TREATMENT: CPT

## 2021-05-07 PROCEDURE — 94761 N-INVAS EAR/PLS OXIMETRY MLT: CPT

## 2021-05-07 PROCEDURE — 99233 SBSQ HOSP IP/OBS HIGH 50: CPT | Performed by: INTERNAL MEDICINE

## 2021-05-07 PROCEDURE — 99232 SBSQ HOSP IP/OBS MODERATE 35: CPT | Performed by: INTERNAL MEDICINE

## 2021-05-07 PROCEDURE — 6370000000 HC RX 637 (ALT 250 FOR IP): Performed by: INTERNAL MEDICINE

## 2021-05-07 PROCEDURE — 6370000000 HC RX 637 (ALT 250 FOR IP): Performed by: HOSPITALIST

## 2021-05-07 PROCEDURE — 6360000002 HC RX W HCPCS: Performed by: HOSPITALIST

## 2021-05-07 PROCEDURE — 80048 BASIC METABOLIC PNL TOTAL CA: CPT

## 2021-05-07 PROCEDURE — 2060000000 HC ICU INTERMEDIATE R&B

## 2021-05-07 PROCEDURE — 2580000003 HC RX 258: Performed by: HOSPITALIST

## 2021-05-07 RX ADMIN — CEFDINIR 300 MG: 300 CAPSULE ORAL at 09:42

## 2021-05-07 RX ADMIN — MAGNESIUM GLUCONATE 500 MG ORAL TABLET 400 MG: 500 TABLET ORAL at 15:19

## 2021-05-07 RX ADMIN — FUROSEMIDE 40 MG: 10 INJECTION, SOLUTION INTRAMUSCULAR; INTRAVENOUS at 16:32

## 2021-05-07 RX ADMIN — INSULIN LISPRO 4 UNITS: 100 INJECTION, SOLUTION INTRAVENOUS; SUBCUTANEOUS at 16:32

## 2021-05-07 RX ADMIN — Medication 2 PUFF: at 18:49

## 2021-05-07 RX ADMIN — INSULIN LISPRO 4 UNITS: 100 INJECTION, SOLUTION INTRAVENOUS; SUBCUTANEOUS at 09:43

## 2021-05-07 RX ADMIN — EZETIMIBE 10 MG: 10 TABLET ORAL at 21:02

## 2021-05-07 RX ADMIN — FERROUS SULFATE TAB 325 MG (65 MG ELEMENTAL FE) 325 MG: 325 (65 FE) TAB at 09:47

## 2021-05-07 RX ADMIN — APIXABAN 5 MG: 5 TABLET, FILM COATED ORAL at 21:02

## 2021-05-07 RX ADMIN — APIXABAN 5 MG: 5 TABLET, FILM COATED ORAL at 09:42

## 2021-05-07 RX ADMIN — METOPROLOL SUCCINATE 100 MG: 50 TABLET, EXTENDED RELEASE ORAL at 09:42

## 2021-05-07 RX ADMIN — INSULIN LISPRO 6 UNITS: 100 INJECTION, SOLUTION INTRAVENOUS; SUBCUTANEOUS at 11:24

## 2021-05-07 RX ADMIN — ASPIRIN 81 MG: 81 TABLET, CHEWABLE ORAL at 21:02

## 2021-05-07 RX ADMIN — FUROSEMIDE 40 MG: 10 INJECTION, SOLUTION INTRAMUSCULAR; INTRAVENOUS at 09:42

## 2021-05-07 RX ADMIN — PANTOPRAZOLE SODIUM 40 MG: 40 TABLET, DELAYED RELEASE ORAL at 06:25

## 2021-05-07 RX ADMIN — INSULIN GLARGINE 60 UNITS: 100 INJECTION, SOLUTION SUBCUTANEOUS at 21:04

## 2021-05-07 RX ADMIN — CEFDINIR 300 MG: 300 CAPSULE ORAL at 21:02

## 2021-05-07 RX ADMIN — CETIRIZINE HYDROCHLORIDE 10 MG: 10 TABLET ORAL at 09:42

## 2021-05-07 RX ADMIN — Medication 2 PUFF: at 06:55

## 2021-05-07 RX ADMIN — ACETAMINOPHEN 650 MG: 325 TABLET ORAL at 21:02

## 2021-05-07 RX ADMIN — Medication 10 ML: at 21:01

## 2021-05-07 RX ADMIN — MAGNESIUM GLUCONATE 500 MG ORAL TABLET 400 MG: 500 TABLET ORAL at 21:02

## 2021-05-07 RX ADMIN — MAGNESIUM GLUCONATE 500 MG ORAL TABLET 400 MG: 500 TABLET ORAL at 09:42

## 2021-05-07 RX ADMIN — INSULIN GLARGINE 40 UNITS: 100 INJECTION, SOLUTION SUBCUTANEOUS at 09:44

## 2021-05-07 RX ADMIN — Medication 10 ML: at 09:43

## 2021-05-07 ASSESSMENT — PAIN DESCRIPTION - PAIN TYPE: TYPE: CHRONIC PAIN

## 2021-05-07 ASSESSMENT — PAIN SCALES - GENERAL: PAINLEVEL_OUTOF10: 3

## 2021-05-07 ASSESSMENT — PAIN DESCRIPTION - ORIENTATION: ORIENTATION: LEFT

## 2021-05-07 NOTE — FLOWSHEET NOTE
05/07/21 0930   Vital Signs   Temp 96.8 °F (36 °C)   Temp Source Oral   Pulse 81   Heart Rate Source Monitor   Resp 20   /61   BP Location Left upper arm   Patient Position Semi fowlers   Level of Consciousness Alert (0)   MEWS Score 1   Patient Currently in Pain Denies   Oxygen Therapy   SpO2 91 %   Pulse Oximeter Device Mode Intermittent   Pulse Oximeter Device Location Finger   O2 Device None (Room air)   Patient is resting showing no s/s of distress. Patient is alert and oriented. Meds were given, see MAR. Patient is denying any needs. Bed is in lowest position and call light is within reach. Will continue to monitor. Shift assessment complete, see flowsheets. Patient's wounds are every other day, and addressed by Regulo Mcclendon RN on 5/6. Ointment applied to sacrum at this time.
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
## 2021-05-07 NOTE — PROGRESS NOTES
Physician Progress Note      PATIENT:               Johann Leger  CSN #:                  [id number]  :                       1965  ADMIT DATE:       5/3/2021 9:26 PM  [address] [place] DATE:  RESPONDING  PROVIDER #:        J Luis Garcia MD        QUERY TEXT:    Stage of Chronic Kidney Disease: Please provide further specificity, if known.     Clinical indicators include: creatinine, bun, bnp, probnp, ckd, cri, bun/cr  Options provided:  -- Chronic kidney disease stage 1  -- Chronic kidney disease stage 2  -- Chronic kidney disease stage 3  -- Chronic kidney disease stage 3a  -- Chronic kidney disease stage 3b  -- Chronic kidney disease stage 4  -- Chronic kidney disease stage 5  -- Chronic kidney disease stage 5, requiring dialysis  -- End stage renal disease  -- Other - I will add my own diagnosis  -- Disagree - Not applicable / Not valid  -- Disagree - Clinically Unable to determine / Unknown        PROVIDER RESPONSE TEXT:    No CKD  SUDHA      Electronically signed by:  J Luis Garcia MD 2021 1:44 PM

## 2021-05-07 NOTE — FLOWSHEET NOTE
05/07/21 1515   Vital Signs   Temp 97 °F (36.1 °C)   Temp Source Axillary   Pulse 78   Heart Rate Source Monitor   Resp 22   /78   BP Location Left upper arm   Patient Position High fowlers   Level of Consciousness Alert (0)   MEWS Score 2   Patient Currently in Pain Denies   Oxygen Therapy   SpO2 94 %   Pulse Oximeter Device Mode Intermittent   Pulse Oximeter Device Location Finger   O2 Device None (Room air)   Patient resting. Denies any needs. Will continue to monitor.

## 2021-05-08 VITALS
SYSTOLIC BLOOD PRESSURE: 111 MMHG | TEMPERATURE: 96.8 F | WEIGHT: 280.2 LBS | DIASTOLIC BLOOD PRESSURE: 67 MMHG | HEART RATE: 83 BPM | RESPIRATION RATE: 18 BRPM | OXYGEN SATURATION: 92 % | HEIGHT: 74 IN | BODY MASS INDEX: 35.96 KG/M2

## 2021-05-08 LAB
ANION GAP SERPL CALCULATED.3IONS-SCNC: 9 MMOL/L (ref 3–16)
BUN BLDV-MCNC: 61 MG/DL (ref 7–20)
CALCIUM SERPL-MCNC: 8.9 MG/DL (ref 8.3–10.6)
CHLORIDE BLD-SCNC: 94 MMOL/L (ref 99–110)
CO2: 30 MMOL/L (ref 21–32)
CREAT SERPL-MCNC: 1.3 MG/DL (ref 0.9–1.3)
GFR AFRICAN AMERICAN: >60
GFR NON-AFRICAN AMERICAN: 58
GLUCOSE BLD-MCNC: 165 MG/DL (ref 70–99)
GLUCOSE BLD-MCNC: 167 MG/DL (ref 70–99)
GLUCOSE BLD-MCNC: 175 MG/DL (ref 70–99)
GLUCOSE BLD-MCNC: 194 MG/DL (ref 70–99)
HCT VFR BLD CALC: 35.3 % (ref 40.5–52.5)
HEMOGLOBIN: 11.1 G/DL (ref 13.5–17.5)
MCH RBC QN AUTO: 24 PG (ref 26–34)
MCHC RBC AUTO-ENTMCNC: 31.5 G/DL (ref 31–36)
MCV RBC AUTO: 76.2 FL (ref 80–100)
PDW BLD-RTO: 19.2 % (ref 12.4–15.4)
PERFORMED ON: ABNORMAL
PLATELET # BLD: 257 K/UL (ref 135–450)
PMV BLD AUTO: 8.6 FL (ref 5–10.5)
POTASSIUM SERPL-SCNC: 4.1 MMOL/L (ref 3.5–5.1)
RBC # BLD: 4.63 M/UL (ref 4.2–5.9)
SODIUM BLD-SCNC: 133 MMOL/L (ref 136–145)
WBC # BLD: 8.6 K/UL (ref 4–11)

## 2021-05-08 PROCEDURE — 6360000002 HC RX W HCPCS: Performed by: HOSPITALIST

## 2021-05-08 PROCEDURE — 80048 BASIC METABOLIC PNL TOTAL CA: CPT

## 2021-05-08 PROCEDURE — 99233 SBSQ HOSP IP/OBS HIGH 50: CPT | Performed by: INTERNAL MEDICINE

## 2021-05-08 PROCEDURE — 85027 COMPLETE CBC AUTOMATED: CPT

## 2021-05-08 PROCEDURE — 6370000000 HC RX 637 (ALT 250 FOR IP): Performed by: HOSPITALIST

## 2021-05-08 PROCEDURE — 94761 N-INVAS EAR/PLS OXIMETRY MLT: CPT

## 2021-05-08 PROCEDURE — 2580000003 HC RX 258: Performed by: HOSPITALIST

## 2021-05-08 PROCEDURE — 6370000000 HC RX 637 (ALT 250 FOR IP): Performed by: INTERNAL MEDICINE

## 2021-05-08 PROCEDURE — 94640 AIRWAY INHALATION TREATMENT: CPT

## 2021-05-08 PROCEDURE — 99239 HOSP IP/OBS DSCHRG MGMT >30: CPT | Performed by: INTERNAL MEDICINE

## 2021-05-08 RX ORDER — TORSEMIDE 20 MG/1
80 TABLET ORAL DAILY
Qty: 120 TABLET | Refills: 0 | Status: SHIPPED | OUTPATIENT
Start: 2021-05-08 | End: 2021-01-01

## 2021-05-08 RX ORDER — OXYCODONE HYDROCHLORIDE 5 MG/1
5 TABLET ORAL EVERY 6 HOURS PRN
Qty: 1 TABLET | Refills: 0
Start: 2021-05-08 | End: 2021-05-09

## 2021-05-08 RX ADMIN — Medication 2 PUFF: at 08:18

## 2021-05-08 RX ADMIN — FUROSEMIDE 40 MG: 10 INJECTION, SOLUTION INTRAMUSCULAR; INTRAVENOUS at 09:08

## 2021-05-08 RX ADMIN — APIXABAN 5 MG: 5 TABLET, FILM COATED ORAL at 09:08

## 2021-05-08 RX ADMIN — FERROUS SULFATE TAB 325 MG (65 MG ELEMENTAL FE) 325 MG: 325 (65 FE) TAB at 09:08

## 2021-05-08 RX ADMIN — Medication 10 ML: at 09:08

## 2021-05-08 RX ADMIN — INSULIN GLARGINE 40 UNITS: 100 INJECTION, SOLUTION SUBCUTANEOUS at 06:58

## 2021-05-08 RX ADMIN — SACUBITRIL AND VALSARTAN 1 TABLET: 24; 26 TABLET, FILM COATED ORAL at 12:40

## 2021-05-08 RX ADMIN — CEFDINIR 300 MG: 300 CAPSULE ORAL at 09:08

## 2021-05-08 RX ADMIN — ACETAMINOPHEN 650 MG: 325 TABLET ORAL at 07:03

## 2021-05-08 RX ADMIN — CETIRIZINE HYDROCHLORIDE 10 MG: 10 TABLET ORAL at 09:08

## 2021-05-08 RX ADMIN — MAGNESIUM GLUCONATE 500 MG ORAL TABLET 400 MG: 500 TABLET ORAL at 09:08

## 2021-05-08 RX ADMIN — PANTOPRAZOLE SODIUM 40 MG: 40 TABLET, DELAYED RELEASE ORAL at 06:58

## 2021-05-08 RX ADMIN — METOPROLOL SUCCINATE 100 MG: 50 TABLET, EXTENDED RELEASE ORAL at 09:08

## 2021-05-08 RX ADMIN — INSULIN LISPRO 2 UNITS: 100 INJECTION, SOLUTION INTRAVENOUS; SUBCUTANEOUS at 09:10
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
## 2021-05-08 NOTE — PROGRESS NOTES
Shift assessment completed. See flow sheet. Medications given. Patient is A&O x4 and denies further needs at this time. Call light within reach. Will continue to monitor.
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
## 2021-05-08 NOTE — DISCHARGE SUMMARY
wall   Capillary Refill: Brisk,< 3 seconds   Peripheral Pulses: +2 palpable, equal bilaterally   GI: Non-tender. Non-distended. Normal bowel sounds. Ostomy in place. Delgado with yellow urine   Skin: Warm and dry. Multiple chronic wounds, see pictures in media tab from recent wound care visit   M/S: s/p R BKA, + edema   Neuro: Awake. + paraplegia   Psych: Oriented x 3. No anxiety or agitation    CBC:   Recent Labs     05/08/21  0556   WBC 8.6   HGB 11.1*   HCT 35.3*   MCV 76.2*        BMP:   Recent Labs     05/06/21  0621 05/07/21  0815 05/08/21  0556   * 134* 133*   K 4.5 4.3 4.1   CL 93* 94* 94*   CO2 27 29 30   BUN 72* 66* 61*   CREATININE 1.6* 1.5* 1.3       CULTURES  SARS-CoV-2, NAAT Not Detected      RADIOLOGY  XR CHEST PORTABLE   Final Result   Hypoinflation with mild discoid atelectasis or scarring along the lung bases   which is more prominent      Questionable small right pleural effusion      Right Port-A-Cath unchanged in position             Discharge Medications     Medication List      CHANGE how you take these medications    Breo Ellipta 100-25 MCG/INH Aepb inhaler  Generic drug: fluticasone-vilanterol  Inhale 2 puffs into the lungs daily  What changed:   · how much to take  · when to take this     insulin glargine 100 UNIT/ML injection vial  Commonly known as: Lantus  INJECT 55 UNITS UNDER THE SKIN TWO TIMES A DAY  What changed: additional instructions     insulin lispro 100 UNIT/ML injection vial  Commonly known as: HUMALOG  INJECT 22 UNITS UNDER THE SKIN THREE TIMES A DAY BEFORE MEALS WITH SLIDING SCALE  What changed: additional instructions     metoprolol succinate 100 MG extended release tablet  Commonly known as: TOPROL XL  TAKE ONE TABLET BY MOUTH DAILY  What changed: when to take this     polyethylene glycol 17 GM/SCOOP powder  Commonly known as: MiraLax  Take 1 scoop daily.   What changed:   · when to take this  · reasons to take this     torsemide 20 MG tablet  Commonly known as: DEMADEX  Take 4 tablets by mouth daily  What changed:   · medication strength  · how much to take     vitamin D 1.25 MG (08781 UT) Caps capsule  Commonly known as: ERGOCALCIFEROL  Take 1 capsule by mouth once a week  What changed: additional instructions        CONTINUE taking these medications    apixaban 5 MG Tabs tablet  Commonly known as: Eliquis  TAKE ONE TABLET BY MOUTH TWICE A DAY     aspirin 81 MG tablet     blood glucose test strips strip  Commonly known as: OneTouch Verio  Use to test five times daily. DX:E11.9     cefdinir 300 MG capsule  Commonly known as: OMNICEF  Take 1 capsule by mouth 2 times daily for 10 days     cetirizine 10 MG tablet  Commonly known as: ZYRTEC  TAKE ONE TABLET BY MOUTH DAILY     cyclobenzaprine 10 MG tablet  Commonly known as: FLEXERIL  Take 1 tablet by mouth 2 times daily as needed for Muscle spasms     docusate sodium 100 MG capsule  Commonly known as: Stool Softener  TAKE TWO CAPSULES BY MOUTH DAILY     Entresto 24-26 MG per tablet  Generic drug: sacubitril-valsartan     ferrous sulfate 325 (65 Fe) MG tablet  Commonly known as: IRON 325  TAKE ONE TABLET BY MOUTH DAILY WITH BREAKFAST     Insulin Pen Needle 31G X 8 MM Misc  Commonly known as: [employer] Pen Needles 31G  Use with Humalog. DX:E11.9     * Insulin Syringe-Needle U-100 31G X 5/16\" 0.5 ML Misc  Commonly known as: [employer] Insulin Syringe  Use 4 times daily. DX: E11.9     * Insulin Syringe-Needle U-100 31G X 5/16\" 1 ML Misc  Commonly known as: [employer] Insulin Syringe  1 each by Does not apply route daily     magnesium oxide 400 (241.3 Mg) MG Tabs tablet  Commonly known as: MAG-OX  TAKE 3 TABLETS BY MOUTH EVERY MORNING AND TAKE 3 TABLETS BY MOUTH EVERY EVENING     metFORMIN 1000 MG tablet  Commonly known as: GLUCOPHAGE  Take 1 tablet by mouth 2 times daily (with meals)     nitroGLYCERIN 0.4 MG SL tablet  Commonly known as: NITROSTAT  up to max of 3 total doses.  If no relief after 1 dose, call 911.     nystatin 095146 UNIT/GM powder  Commonly known as: MYCOSTATIN  Mix with your zinc oxide paste, apply to groin rash 3 times daily as needed. oxyCODONE 5 MG immediate release tablet  Commonly known as: ROXICODONE  Take 1 tablet by mouth every 6 hours as needed for Pain for up to 1 day. pantoprazole 40 MG tablet  Commonly known as: PROTONIX  TAKE ONE TABLET BY MOUTH DAILY     Praluent 150 MG/ML Soaj  Generic drug: Alirocumab     promethazine 25 MG tablet  Commonly known as: PHENERGAN  Take 1 tablet by mouth every 6 hours as needed for Nausea     senna 8.6 MG tablet  Commonly known as: Senna-Lax  TAKE TWO TABLETS BY MOUTH DAILY     traZODone 50 MG tablet  Commonly known as: DESYREL  TAKE ONE TABLET BY MOUTH ONCE NIGHTLY         * This list has 2 medication(s) that are the same as other medications prescribed for you. Read the directions carefully, and ask your doctor or other care provider to review them with you. Where to Get Your Medications      These medications were sent to [address]  [address] [place] [address]    Phone: [phone]   · torsemide 20 MG tablet     Information about where to get these medications is not yet available    Ask your nurse or doctor about these medications  · oxyCODONE 5 MG immediate release tablet       Discharged in stable condition to home with Portia Lock     Follow Up: Follow up with PCP,cardiology OP        Total time spent on discharge is 35 minutes  PRAVIN Monique.

## 2021-05-08 NOTE — CARE COORDINATION
DISCHARGE ORDER  Date/Time 2021 11:36 AM  Completed by: Domenica Lozoya Case Management    Patient Name: Liliam Grider      : 1965  Admitting Diagnosis: Heart failure ([address] [I50.9]      Admit order Date and Status: INPT  5/3/21  (verify MD's last order for status of admission)      Noted discharge order. If applicable PT/OT recommendation at Discharge: n/a  DME recommendation by PT/OT: n/a  Confirmed discharge plan  : Yes  with whom_with pt______________  If pt confirmed DC plan does family need to be contacted by CM No if yes who_pt to call spouse_____  Discharge Plan: Pt to return home at discharge with [facility] resumption of services. Transportation arranged with [employer] for a p/u time of 0815-9405 to return home. 1225: Rec'd call from [facility] stating that they were behind schedule and would pick pt up at 1430. Reviewed chart. Role of discharge planner explained and patient verbalized understanding. Discharge order is noted. Has Home O2 in place on admit:  No  Informed of need to bring portable home O2 tank on day of discharge for nursing to connect prior to leaving:   Not Indicated  Verbalized agreement/Understanding:   Not Indicated  Pt is being d/c'd to home today. Pt's O2 sats are 92% on roomair. Discharge timeout done with Rodolfo Motley. All discharge needs and concerns addressed.

## 2021-05-08 NOTE — PROGRESS NOTES
Delgado removed. Port de accessed. Patient educated on discharge instructions as well as new medications use, dosage, administration and possible side effects. Patient verified knowledge. Port de accessed. . Telemetry monitor removed and returned to [facility]. Pt left facility in stable condition to Home with all of their personal belongings.

## 2021-05-08 NOTE — PROGRESS NOTES
VSS. A+Ox4. Medications given, see MAR. Head to toe completed, see Flowsheets. No needs or discomforts stated at this time. Pt pillows repositioned. Call light in easy reach, bedside table in easy reach, and bed in lowest position.   Jalil Navarrete RN
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
## 2021-05-10 ENCOUNTER — CARE COORDINATION (OUTPATIENT)
Dept: CASE MANAGEMENT | Age: 56
End: 2021-05-10

## 2021-05-10 DIAGNOSIS — I50.23 ACUTE ON CHRONIC SYSTOLIC CONGESTIVE HEART FAILURE (HCC): Primary | ICD-10-CM

## 2021-05-10 PROCEDURE — 1111F DSCHRG MED/CURRENT MED MERGE: CPT | Performed by: INTERNAL MEDICINE

## 2021-05-10 NOTE — CARE COORDINATION
[facility] Transitions Initial Follow Up Call    Call within 2 business days of discharge: Yes    Patient: Gaby Catalan Patient : 1965   MRN: [id number]  Reason for Admission: CHF  Discharge Date: 21 RARS: Readmission Risk Score: 25      Last Discharge [facility]       Complaint Diagnosis Description Type Department Provider    5/3/21 Shortness of Breath; Hypotension acute on chronic systolic chf . .. ED to Hosp-Admission (Discharged) ([facility]) Elliot Romero MD; Gwen. .. Spoke with: Gaby Catalan (patient)    Facility: [facility]    Non-face-to-face services provided:  Obtained and reviewed discharge summary and/or continuity of care documents      Was this an external facility discharge? No Discharge Facility:     Challenges to be reviewed by the provider   Additional needs identified to be addressed with provider No           Method of communication with provider : none    Discussed COVID-19 related testing which was available at this time. Test results were negative. Patient informed of results, if available? Yes    Advance Care Planning:   Does patient have an Advance Directive:  not on file. Was this a readmission? No  Patient stated reason for admission: SOB  Patients top risk factors for readmission: functional physical ability and medical condition-     Care Transition Nurse (CTN) contacted the patient by telephone to perform post hospital discharge assessment. Provided introduction to self, and explanation of the CTN role. CTN reviewed discharge instructions, medical action plan and red flags with patient who verbalized understanding. Patient given an opportunity to ask questions and does not have any further questions or concerns at this time. Were discharge instructions available to patient? Yes.  Reviewed appropriate site of care based on symptoms and resources available to patient including: PCP, Specialist, Home health and wound order.    Care Transitions 24 Hour Call    Do you have all of your prescriptions and are they filled?: Yes  Patient DME: Wheelchair, Hospital bed, Shower chair, Other  Other Patient DME: prateek lift  Patient Home Equipment: CPAP  Do you have support at home?: Partner/Spouse/SO, Child  Are you an active caregiver in your home?: Yes  Care Transitions Interventions         Follow Up  Future Appointments   Date Time Provider Alyson Cardona   5/14/2021  9:40 AM DARIAN Perez - CNP [facility]   5/14/2021 10:15 AM MD Jayda Azar [facility]   5/21/2021 10:15 AM MD Jayda Azar [facility]   5/28/2021 10:15 AM MD Jayda Azar [facility]   6/1/2021  2:00 PM [facility]   6/4/2021 10:15 AM MD Jayda Azar [facility]       Russell Wright RN

## 2021-05-14 ENCOUNTER — OFFICE VISIT (OUTPATIENT)
Dept: INTERNAL MEDICINE CLINIC | Age: 56
End: 2021-05-14

## 2021-05-14 ENCOUNTER — HOSPITAL ENCOUNTER (OUTPATIENT)
Dept: WOUND CARE | Age: 56
Discharge: HOME | End: 2021-05-14
Payer: MEDICARE

## 2021-05-14 VITALS
BODY MASS INDEX: 35.98 KG/M2 | SYSTOLIC BLOOD PRESSURE: 136 MMHG | HEART RATE: 62 BPM | HEIGHT: 74 IN | RESPIRATION RATE: 16 BRPM | OXYGEN SATURATION: 92 % | DIASTOLIC BLOOD PRESSURE: 70 MMHG

## 2021-05-14 VITALS
WEIGHT: 252 LBS | BODY MASS INDEX: 32.34 KG/M2 | TEMPERATURE: 96.9 F | RESPIRATION RATE: 18 BRPM | SYSTOLIC BLOOD PRESSURE: 104 MMHG | HEART RATE: 65 BPM | DIASTOLIC BLOOD PRESSURE: 61 MMHG | HEIGHT: 74 IN

## 2021-05-14 DIAGNOSIS — I50.22 SYSTOLIC CHF, CHRONIC (HCC): ICD-10-CM

## 2021-05-14 DIAGNOSIS — I25.5 ISCHEMIC CARDIOMYOPATHY: Chronic | ICD-10-CM

## 2021-05-14 DIAGNOSIS — I25.5 ISCHEMIC CARDIOMYOPATHY: Primary | Chronic | ICD-10-CM

## 2021-05-14 DIAGNOSIS — L98.492 ULCER OF CHEST WALL WITH FAT LAYER EXPOSED (HCC): Primary | ICD-10-CM

## 2021-05-14 DIAGNOSIS — T81.31XA POSTOPERATIVE WOUND DEHISCENCE, INITIAL ENCOUNTER: ICD-10-CM

## 2021-05-14 DIAGNOSIS — L98.492 ULCER OF CHEST WALL WITH FAT LAYER EXPOSED (HCC): ICD-10-CM

## 2021-05-14 LAB
ANION GAP SERPL CALCULATED.3IONS-SCNC: 21 MMOL/L (ref 3–16)
BUN BLDV-MCNC: 71 MG/DL (ref 7–20)
CALCIUM SERPL-MCNC: 8.8 MG/DL (ref 8.3–10.6)
CHLORIDE BLD-SCNC: 93 MMOL/L (ref 99–110)
CO2: 22 MMOL/L (ref 21–32)
CREAT SERPL-MCNC: 1.5 MG/DL (ref 0.9–1.3)
GFR AFRICAN AMERICAN: 59
GFR NON-AFRICAN AMERICAN: 49
GLUCOSE BLD-MCNC: 245 MG/DL (ref 70–99)
POTASSIUM SERPL-SCNC: 4.3 MMOL/L (ref 3.5–5.1)
SODIUM BLD-SCNC: 136 MMOL/L (ref 136–145)

## 2021-05-14 PROCEDURE — G8428 CUR MEDS NOT DOCUMENT: HCPCS | Performed by: NURSE PRACTITIONER

## 2021-05-14 PROCEDURE — 1036F TOBACCO NON-USER: CPT | Performed by: NURSE PRACTITIONER

## 2021-05-14 PROCEDURE — 1111F DSCHRG MED/CURRENT MED MERGE: CPT | Performed by: NURSE PRACTITIONER

## 2021-05-14 PROCEDURE — 99212 OFFICE O/P EST SF 10 MIN: CPT | Performed by: NURSE PRACTITIONER

## 2021-05-14 PROCEDURE — G8417 CALC BMI ABV UP PARAM F/U: HCPCS | Performed by: NURSE PRACTITIONER

## 2021-05-14 PROCEDURE — 17250 CHEM CAUT OF GRANLTJ TISSUE: CPT | Performed by: INTERNAL MEDICINE

## 2021-05-14 PROCEDURE — 17250 CHEM CAUT OF GRANLTJ TISSUE: CPT

## 2021-05-14 PROCEDURE — 3017F COLORECTAL CA SCREEN DOC REV: CPT | Performed by: NURSE PRACTITIONER

## 2021-05-14 RX ORDER — LIDOCAINE 40 MG/G
CREAM TOPICAL ONCE
Status: DISCONTINUED | OUTPATIENT
Start: 2021-05-14 | End: 2021-05-15 | Stop reason: HOSPADM

## 2021-05-14 RX ORDER — TRAZODONE HYDROCHLORIDE 50 MG/1
TABLET ORAL
Qty: 90 TABLET | Refills: 1 | Status: SHIPPED | OUTPATIENT
Start: 2021-05-14 | End: 2021-01-01 | Stop reason: SDUPTHER

## 2021-05-14 RX ORDER — LIDOCAINE 40 MG/G
CREAM TOPICAL ONCE
Status: CANCELLED | OUTPATIENT
Start: 2021-05-14 | End: 2021-05-14

## 2021-05-14 RX ORDER — LIDOCAINE HYDROCHLORIDE 40 MG/ML
SOLUTION TOPICAL ONCE
Status: CANCELLED | OUTPATIENT
Start: 2021-05-14 | End: 2021-05-14

## 2021-05-14 RX ORDER — LIDOCAINE 50 MG/G
OINTMENT TOPICAL ONCE
Status: CANCELLED | OUTPATIENT
Start: 2021-05-14 | End: 2021-05-14

## 2021-05-14 RX ORDER — SACUBITRIL AND VALSARTAN 24; 26 MG/1; MG/1
1 TABLET, FILM COATED ORAL 2 TIMES DAILY
Qty: 60 TABLET | Refills: 3 | Status: ON HOLD
Start: 2021-05-14 | End: 2021-01-01 | Stop reason: HOSPADM

## 2021-05-14 ASSESSMENT — ACTIVITIES OF DAILY LIVING (ADL): EFFECT OF PAIN ON DAILY ACTIVITIES: REPOSITIONING

## 2021-05-14 ASSESSMENT — PAIN DESCRIPTION - LOCATION: LOCATION: CHEST

## 2021-05-14 ASSESSMENT — PAIN - FUNCTIONAL ASSESSMENT: PAIN_FUNCTIONAL_ASSESSMENT: ACTIVITIES ARE NOT PREVENTED

## 2021-05-14 ASSESSMENT — ENCOUNTER SYMPTOMS
COUGH: 1
SHORTNESS OF BREATH: 0

## 2021-05-14 ASSESSMENT — PAIN DESCRIPTION - PAIN TYPE: TYPE: CHRONIC PAIN

## 2021-05-14 ASSESSMENT — PAIN DESCRIPTION - ONSET: ONSET: ON-GOING

## 2021-05-14 ASSESSMENT — PAIN DESCRIPTION - ORIENTATION: ORIENTATION: LEFT

## 2021-05-14 ASSESSMENT — PAIN DESCRIPTION - PROGRESSION: CLINICAL_PROGRESSION: NOT CHANGED

## 2021-05-14 NOTE — PROGRESS NOTES
Post-Discharge Transitional Care Management Services or Hospital Follow Up      Lien Graves   YOB: 1965    Date of Office Visit:  5/14/2021  Date of Hospital Admission: 5/3/21  Date of Hospital Discharge: 5/8/21  Readmission Risk Score(high >=14%.  Medium >=10%):Readmission Risk Score: 25      Care management risk score Rising risk (score 2-5) and Complex Care (Scores >=6): 10     Non face to face  following discharge, date last encounter closed (first attempt may have been earlier): 5/10/2021 11:04 AM 5/10/2021 11:04 AM    Call initiated 2 business days of discharge: Yes     Patient Active Problem List   Diagnosis    Mixed hyperlipidemia    Coronary artery disease involving native coronary artery of native heart without angina pectoris    Paraplegia, complete ([address]    Chronic back pain    Arthritis    Urinary tract infection associated with catheterization of urinary tract ([address]    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 ([address]    Cutaneous candidiasis    Iron deficiency anemia due to chronic blood loss    Lymphedema of both lower extremities    Skin ulcer of left great toe, limited to breakdown of skin (HCC)    Neurogenic bladder    Neurogenic bowel    Chronic osteomyelitis ([address]    Hypergranulation    Dehiscence of surgical wound of T-spine    Onychomycosis    Dystrophic nail    Ischemic cardiomyopathy    Gitelman syndrome    Acute on chronic systolic congestive heart failure (HCC)    LIBORIO on CPAP    Diabetes mellitus ([address]    Hyperglycemia due to diabetes mellitus ([address]    Type 2 diabetes mellitus with diabetic peripheral angiopathy without gangrene, with long-term current use of insulin (HCC)    Ulcer of chest wall with fat layer exposed, following recent abscess    Ulcer of right thigh, limited to breakdown of skin (HCC)    Chronic cough    Abscess of chest wall (left side)    Heart failure (HCC)       Allergies   Allergen Reactions    Benadryl [Diphenhydramine AND TAKE 3 TABLETS BY MOUTH EVERY EVENING             metFORMIN (GLUCOPHAGE) 1000 MG tablet  Take 1 tablet by mouth 2 times daily (with meals)             metoprolol succinate (TOPROL XL) 100 MG extended release tablet  TAKE ONE TABLET BY MOUTH DAILY             nitroGLYCERIN (NITROSTAT) 0.4 MG SL tablet  up to max of 3 total doses. If no relief after 1 dose, call 911.             nystatin (MYCOSTATIN) 744953 UNIT/GM powder  Mix with your zinc oxide paste, apply to groin rash 3 times daily as needed. pantoprazole (PROTONIX) 40 MG tablet  TAKE ONE TABLET BY MOUTH DAILY             polyethylene glycol (MIRALAX) 17 GM/SCOOP powder  Take 1 scoop daily. promethazine (PHENERGAN) 25 MG tablet  Take 1 tablet by mouth every 6 hours as needed for Nausea             sacubitril-valsartan (ENTRESTO) 24-26 MG per tablet  Take 1 tablet by mouth 2 times daily             senna (SENNA-LAX) 8.6 MG tablet  TAKE TWO TABLETS BY MOUTH DAILY             torsemide (DEMADEX) 20 MG tablet  Take 4 tablets by mouth daily             traZODone (DESYREL) 50 MG tablet  TAKE ONE TABLET BY MOUTH ONCE NIGHTLY             vitamin D (ERGOCALCIFEROL) 1.25 MG (67020 UT) CAPS capsule  Take 1 capsule by mouth once a week                   Medications marked \"taking\" at this time  No outpatient medications have been marked as taking for the 5/14/21 encounter (Office Visit) with DARIAN Uribe CNP. Medications patient taking as of now reconciled against medications ordered at time of hospital discharge: Yes    Chief Complaint   Patient presents with    Follow-Up from Hospital       HPI    Inpatient course: Discharge summary reviewed- see chart. Interval history/Current status: patient was admitted to [facility] 5/3-5/8 for CHF and SUDHA. He feels much improved since being discharged. He is on 80 mg Demadex per day. He is compliant with this. No swelling. His dyspnea is much improved.   He has an appointment with  Atul today. Review of Systems   Constitutional: Negative for fever. Respiratory: Positive for cough (he is seeing Dr. Janel Reese soon for this). Negative for shortness of breath. Cardiovascular: Negative for chest pain. Vitals:    05/14/21 0953   BP: 136/70   Site: Right Upper Arm   Position: Sitting   Pulse: 62   Resp: 16   SpO2: 92%   Height: 6' 2\" (1.88 m)     Body mass index is 35.98 kg/m². Wt Readings from Last 3 Encounters:   05/08/21 280 lb 3.2 oz (127.1 kg)   03/09/21 252 lb 6.8 oz (114.5 kg)   03/04/21 260 lb (117.9 kg)     BP Readings from Last 3 Encounters:   05/08/21 111/67   04/30/21 (!) 83/56   04/23/21 108/72       Physical Exam  Constitutional:       Appearance: Normal appearance. HENT:      Head: Normocephalic and atraumatic. Eyes:      Conjunctiva/sclera: Conjunctivae normal.      Pupils: Pupils are equal, round, and reactive to light. Neck:      Musculoskeletal: Neck supple. Cardiovascular:      Rate and Rhythm: Normal rate and regular rhythm. Heart sounds: Normal heart sounds. No murmur. No friction rub. No gallop. Pulmonary:      Effort: Pulmonary effort is normal.      Breath sounds: Normal breath sounds. No wheezing, rhonchi or rales. Musculoskeletal:      Left lower leg: Edema present. Comments: Right BKA; paraplegia   Lymphadenopathy:      Cervical: No cervical adenopathy. Skin:     General: Skin is warm and dry. Neurological:      General: No focal deficit present. Mental Status: He is alert and oriented to person, place, and time. Psychiatric:         Mood and Affect: Mood normal.         Behavior: Behavior normal.         Thought Content: Thought content normal.         Judgment: Judgment normal.           Assessment/Plan:    Ischemic cardiomyopathy  Acute on Chronic systolic CHF  - Last EF 87-36%   - On Entresto  - Diuresed with IV Lasix   - Weight from 293 -> 280 lbs   - Continue torsemide 80 mg daily  - Checked BMP.       CAD  Elevated

## 2021-05-14 NOTE — PLAN OF CARE
Patient with right knee healed. Overall wounds to left great toe, right and left chest wall all improved slightly and the chronic back wound is stable. Follow up in wound clinic as ordered next week, home care remains in place to provider intermittent care. Discharge instructions reviewed with patient, all questions answered, copy given to patient. Dressings were applied to all wounds per M.D. Instructions at this visit.

## 2021-05-14 NOTE — PLAN OF CARE
[facility] Physician Orders and Discharge [address], [address] [facility], [facility]  Telephone: [phone]      Fax: [phone]        Your home care [address] [facility] wound-care supplies will be provided by: [facility] care     NAME: Erik Doss Jr   DATE of BIRTH:  1965  PRIMARY DIAGNOSIS FOR WOUND CARE CENTER:  Pressure Ulcer Stage 2     Wound cleansing:   Do not scrub or use excessive force. Wash hands with soap and water before and after dressing changes. Prior to applying a clean dressing, cleanse wound with normal saline, wound cleanser, or mild soap and water.  Ask your physician or nurse before getting the wound(s) wet in the shower.                Wound care for home:     Right Knee:   Betadine, Mepitel One  cast padding x2  Ace then cotton stockinette  Leave on all week     Left great toe wound:  antibiotic ointment   adaptic    2x2   tubular gauze  Leave on all week     Right Chest wall Wound:  Dr. Victor Manuel Trevino used Silver Nitrate on your wound today.  The wound will be black or silver in appearance  for the next several days, this is normal.  Calmoseptine to skin irritation  Silver Alginate tucked in to the wound  Cover with an absorptive dressing  Change 3 x week     Chronic Open Back Wound:   Spray Hypochlorous Acid into wound let sit, do  Not rinse  Calmoseptine to alin wound  Silver Alginate for drainage as needed (we are applying today)  Cover with a Mepilex Border   Change 3 x week     Left Chest Wall Wound:   CONTINUE 2-3 times a day apply warm compress then apply small amount of antibiotic ointment (we do not want it dry it needs to continue to drain)  Antibiotic ointment   Dry dressing  Change Daily     Buttocks/Posterior Thigh:  Calmoseptine to protect         Please note, all wounds (unless stated otherwise here) were mechanically debrided at the time of cleansing here in the wound-care center today, so a

## 2021-05-17 ENCOUNTER — CARE COORDINATION (OUTPATIENT)
Dept: CASE MANAGEMENT | Age: 56
End: 2021-05-17

## 2021-05-17 PROBLEM — L97.111 ULCER OF RIGHT THIGH, LIMITED TO BREAKDOWN OF SKIN (HCC): Status: RESOLVED | Noted: 2021-02-22 | Resolved: 2021-05-17

## 2021-05-17 NOTE — CARE COORDINATION
[facility] Transitions Follow Up Call    2021    Patient: Ej Deal  Patient : 1965   MRN: [id number]  Reason for Admission: CHF  Discharge Date: 21 RARS: Readmission Risk Score: 25         Spoke with: Ej Deal     Completed TCM and wound care visits as scheduled. Reports he feels well today. Appetite good. Denies SOB. He was weighed at the wound care visit and states they plan to continue at follow up visits. [facility] started services as planned. He denies needs/concerns today. CTN provided contact info for future needs. Care Transitions Subsequent and Final Call    Schedule Follow Up Appointment with PCP: Completed  Subsequent and Final Calls  Do you have any ongoing symptoms?: No  Have your medications changed?: No  Do you have any questions related to your medications?: No  Do you currently have any active services?: Yes  Are you currently active with any services?: Home Health, Outpatient/Community Services  Do you have any needs or concerns that I can assist you with?: No  Identified Barriers: Impairment  Care Transitions Interventions  No Identified Needs  Other Interventions:            Follow Up  Future Appointments   Date Time Provider Alyson Cardona   2021 10:15 AM MD Therese Mayfield   2021 10:15 AM MD Therese Mayfield   [facility]   2021 10:15 AM MD Therese Mayfield RN

## 2021-05-17 NOTE — PROGRESS NOTES
[facility] Progress Note    Purnima Phillips     : 1965    DATE OF VISIT:  2021    Subjective:     Purnima Phillips is a 50 y.o. male who has an infection-associated ulcer located on the chest (on the lower left and on the lower right). Significant symptoms or pertinent wound history since last visit: overall definitely doing better than a week or two ago, after having been admitted to the hospital and diuresed significantly. Less dyspnea now, no chest pressure, more energy, good appetite. He tells me that his cough had started to improve with the steroid / beta-agonist inhaler even before his IV diuretics, which is encouraging. No F/C/D. Completed his course of Omnicef this week (partly for UTI, partly for left chest wall abscess). No diarrhea, no sore throat or mouth, no drug rash. Lost close to 30# in the last couple of weeks, I believe. He tells me that some cloudy drainage was still able to be expressed from his left chest wall wound as of yesterday, but today it was just a small amount of blood and serum. Additional ulcer(s) noted? yes. T-spine surgical dehiscence; right buttock pressure ulcer; right thigh and knee look about healed; left great toe still open but small. His current medication list consists of Alirocumab, Insulin Pen Needle, Insulin Syringe-Needle U-100, apixaban, aspirin, blood glucose test strips, cetirizine, cyclobenzaprine, docusate sodium, ferrous sulfate, fluticasone-vilanterol, insulin glargine, insulin lispro, magnesium oxide, metFORMIN, metoprolol succinate, nitroGLYCERIN, nystatin, pantoprazole, polyethylene glycol, promethazine, sacubitril-valsartan, senna, torsemide, traZODone, and vitamin D. Finished about 2 weeks of Omnicef for his UTI and left chest wall abscess.     Allergies: Benadryl [diphenhydramine hcl], Statins [statins], Cephalexin, Morphine, Penicillins, and Sulfa antibiotics    Objective:     Vitals:    21 1034 BP: 104/61   Pulse: 65   Resp: 18   Temp: 96.9 °F (36.1 °C)   TempSrc: Oral   Weight: 252 lb (114.3 kg)   Height: 6' 2\" (1.88 m)     AAOx3, overweight, does look less fatigued, more comfortable, not tachypneic  No icterus, thrush, drug rash, abd tenderness  Decreased LE edema  No cellulitis, angitis, fluctuance  Left foot stable lymphedema, cool temp, weak pulses, slow cap refill  [place]hetna-ulcer skin: indurated and slightly cristina at T-spine; some erythema and fewer friction changes at buttock; some fragile and moist hyperkeratosis at right knee; healthy and pink at the left great toe; indurated and mostly pink at the chest wall wounds, slight papular rash on the right side, slight fluctuance on the left. Ulcer(s): T-spine with stable hardware exposure, just one small area of hypergranulation; right chest wall starting to epithelialize more, still some depth, a bit of hypergranulation, friability and minor medial undermining; left chest just a couple of open sinus tracts, less tender, small amount of serosanguinous fluid expressed on repeated manual pressure; right buttock less friction changes, more of a distinct stage 2 granular pressure ulcer now; right thigh healed; right knee delicately healed; left great toe small, clean, red, granular. Photos also saved in electronic chart.     Today's Wound Measurements, per RN documentation:  Wound 04/09/21 #66, Right Knee cluster, Trauma, Full Thickness, Onsret 4/7/21-Wound Length (cm): 0.2 cm  Wound 04/09/21 #68, Right Posterior Thigh, Trauma, Full Thickness, Onsret 4/7/21-Wound Length (cm): 0 cm  Wound 02/05/21 #62, Right Buttock cluster, Pressure Injury, Stage 2, Onset 1/15/21-Wound Length (cm): 1.5 cm  Wound 05/14/21 #70, left great toe nail, diabetic riggins 1, onset 5/14/21-Wound Length (cm): 0.5 cm  Wound 05/14/21 #71, left buttocks, pressure stage 2, onset 5/14/21-Wound Length (cm): 2 cm  Wound 04/09/21 #67, Left Chest Wall Cluster (inframmatory),Abcess, Full Thickness, Onsret 4/7/21-Wound Length (cm): 0.6 cm  Wound 02/19/21 #63, Right Chest Wall (inframammary), Abscess, Full Thickness, Onset 2/16/21-Wound Length (cm): 0.5 cm  Wound 04/16/21 #69, left great toe medial ? , traumatic, partial thickness, onset 4/13/21-Wound Length (cm): 0 cm  Wound 03/19/21 #65, left great toe, traumatic, partial thickness, onset 3/17/21-Wound Length (cm): 0.5 cm    Wound 04/09/21 #66, Right Knee cluster, Trauma, Full Thickness, Onsret 4/7/21-Wound Width (cm): 0.2 cm  Wound 04/09/21 #68, Right Posterior Thigh, Trauma, Full Thickness, Onsret 4/7/21-Wound Width (cm): 0 cm  Wound 02/05/21 #62, Right Buttock cluster, Pressure Injury, Stage 2, Onset 1/15/21-Wound Width (cm): 1.5 cm  Wound 05/14/21 #70, left great toe nail, diabetic riggins 1, onset 5/14/21-Wound Width (cm): 0.3 cm  Wound 05/14/21 #71, left buttocks, pressure stage 2, onset 5/14/21-Wound Width (cm): 1.5 cm  Wound 04/09/21 #67, Left Chest Wall Cluster (inframmatory),Abcess, Full Thickness, Onsret 4/7/21-Wound Width (cm): 3 cm  Wound 02/19/21 #63, Right Chest Wall (inframammary), Abscess, Full Thickness, Onset 2/16/21-Wound Width (cm): 3.2 cm  Wound 04/16/21 #69, left great toe medial ? , traumatic, partial thickness, onset 4/13/21-Wound Width (cm): 0 cm  Wound 03/19/21 #65, left great toe, traumatic, partial thickness, onset 3/17/21-Wound Width (cm): 0.6 cm    Wound 04/09/21 #66, Right Knee cluster, Trauma, Full Thickness, Onsret 4/7/21-Wound Depth (cm): 0.1 cm  Wound 04/09/21 #68, Right Posterior Thigh, Trauma, Full Thickness, Onsret 4/7/21-Wound Depth (cm): 0 cm  Wound 02/05/21 #62, Right Buttock cluster, Pressure Injury, Stage 2, Onset 1/15/21-Wound Depth (cm): 0.1 cm  Wound 05/14/21 #70, left great toe nail, diabetic riggins 1, onset 5/14/21-Wound Depth (cm): 0.2 cm  Wound 05/14/21 #71, left buttocks, pressure stage 2, onset 5/14/21-Wound Depth (cm): 0.1 cm  Wound 04/09/21 #67, Left Chest Wall Cluster (inframmatory),Abcess, Full 4/7/21-Dressing/Treatment:  (PSO, dry dressing )  Wound 02/19/21 #63, Right Chest Wall (inframammary), Abscess, Full Thickness, Onset 2/16/21-Dressing/Treatment:  (kiran, silver alginate, dry dressing )  Wound 04/16/21 #69, left great toe medial ? , traumatic, partial thickness, onset 4/13/21-Dressing/Treatment: Other (comment) (pso,adaptic,2x2s,tubular gauze)  Wound 03/19/21 #65, left great toe, traumatic, partial thickness, onset 3/17/21-Dressing/Treatment: Other (comment) (pso,adaptic,2x2,tubular gauze). No dressing needed for the right thigh. Keep above dressing in place on the right knee and the left great toe, for the week. Continue some warm, moist compresses to the left chest during the day, to encourage any ongoing drainage. No additional Abx for now. Keep focused on protein intake, glucose control, offloading. Call between now and next week if left chest feels more inflamed again, or certainly if any fever, chills, etc.     Home treatment: good handwashing before and after any dressing changes. Cleanse wound with saline or soap & water before dressing change. May use Vaseline (petrolatum), Aquaphor, Aveeno, CeraVe, Cetaphil, Eucerin, Lubriderm, etc for dry skin. Dressing type for home: as above, TIW for the T-spine and right chest, daily or PRN for the buttocks. Written discharge instructions given to patient. Follow up in 1 week.     Electronically signed by Ellie Thomas MD on 5/17/2021 at 12:29 PM.

## 2021-05-21 ENCOUNTER — HOSPITAL ENCOUNTER (OUTPATIENT)
Dept: WOUND CARE | Age: 56
Discharge: HOME | End: 2021-05-21
Payer: MEDICARE

## 2021-05-21 VITALS
SYSTOLIC BLOOD PRESSURE: 105 MMHG | RESPIRATION RATE: 18 BRPM | BODY MASS INDEX: 32.34 KG/M2 | HEART RATE: 79 BPM | DIASTOLIC BLOOD PRESSURE: 65 MMHG | WEIGHT: 252 LBS | HEIGHT: 74 IN | TEMPERATURE: 97.1 F

## 2021-05-21 DIAGNOSIS — T81.31XA POSTOPERATIVE WOUND DEHISCENCE, INITIAL ENCOUNTER: ICD-10-CM

## 2021-05-21 DIAGNOSIS — L92.9 HYPERGRANULATION: ICD-10-CM

## 2021-05-21 DIAGNOSIS — L98.492 ULCER OF CHEST WALL WITH FAT LAYER EXPOSED (HCC): Primary | ICD-10-CM

## 2021-05-21 PROCEDURE — 17250 CHEM CAUT OF GRANLTJ TISSUE: CPT

## 2021-05-21 PROCEDURE — 17250 CHEM CAUT OF GRANLTJ TISSUE: CPT | Performed by: INTERNAL MEDICINE

## 2021-05-21 RX ORDER — LIDOCAINE 50 MG/G
OINTMENT TOPICAL ONCE
Status: CANCELLED | OUTPATIENT
Start: 2021-05-21 | End: 2021-05-21

## 2021-05-21 RX ORDER — LIDOCAINE 40 MG/G
CREAM TOPICAL ONCE
Status: CANCELLED | OUTPATIENT
Start: 2021-05-21 | End: 2021-05-21

## 2021-05-21 RX ORDER — LIDOCAINE HYDROCHLORIDE 40 MG/ML
SOLUTION TOPICAL ONCE
Status: CANCELLED | OUTPATIENT
Start: 2021-05-21 | End: 2021-05-21

## 2021-05-21 RX ORDER — LIDOCAINE 40 MG/G
CREAM TOPICAL ONCE
Status: DISCONTINUED | OUTPATIENT
Start: 2021-05-21 | End: 2021-01-01 | Stop reason: HOSPADM

## 2021-05-21 ASSESSMENT — PAIN DESCRIPTION - ONSET: ONSET: ON-GOING

## 2021-05-21 ASSESSMENT — PAIN DESCRIPTION - PROGRESSION: CLINICAL_PROGRESSION: NOT CHANGED

## 2021-05-21 ASSESSMENT — PAIN DESCRIPTION - ORIENTATION: ORIENTATION: LEFT

## 2021-05-21 ASSESSMENT — PAIN DESCRIPTION - LOCATION: LOCATION: CHEST

## 2021-05-21 ASSESSMENT — PAIN SCALES - GENERAL: PAINLEVEL_OUTOF10: 3

## 2021-05-21 ASSESSMENT — PAIN DESCRIPTION - FREQUENCY: FREQUENCY: INTERMITTENT

## 2021-05-21 NOTE — PLAN OF CARE
Noted improvement in wounds to chest today. Chronic back wound remains stable. Patient states he is tolerating offloading better since his cough is improving. Follow up in 1 week in wound clinic. Discharge instructions reviewed with patient, all questions answered, copy given to patient. Dressings were applied to all wounds per M.D. Instructions at this visit.

## 2021-05-21 NOTE — PROGRESS NOTES
[facility] Physician Orders and Discharge [facility]  [address], [address]  [place], [facility]  Telephone: [phone]      Fax: [phone]        Your home care company:   [facility]     Your wound-care supplies will be provided by:  Home care     NAME:  Jorge Sainz   YOB: 1965  PRIMARY DIAGNOSIS FOR WOUND CARE CENTER:  Pressure Ulcer Stage 2     Wound cleansing:   Do not scrub or use excessive force. Wash hands with soap and water before and after dressing changes. Prior to applying a clean dressing, cleanse wound with normal saline, wound cleanser, or mild soap and water. Ask your physician or nurse before getting the wound(s) wet in the shower. Wound care for home:     Right Knee:   Betadine, Mepitel One  cast padding x2  Ace then cotton stockinette  Leave on all week     Left great toe wound:  antibiotic ointment   adaptic    2x2   tubular gauze  Leave on all week     Right Chest wall Wound:  Dr. Nitza Sharma used Silver Nitrate on your wound today.   The wound will be black or silver in appearance  for the next several days, this is normal.  Calmoseptine to skin irritation  Silver Alginate tucked in to the wound  Cover with an absorptive dressing  Change 3 x week     Chronic Open Back Wound:   Spray Hypochlorous Acid into wound let sit, do  Not rinse  Calmoseptine to alin wound  Silver Alginate for drainage as needed (we are applying today)  Cover with a Mepilex Border   Change 3 x week     Left Chest Wall Wound:   CONTINUE 2-3 times a day apply warm compress then apply small amount of antibiotic ointment (we do not want it dry it needs to continue to drain)  Antibiotic ointment   Dry dressing  Change Daily     Buttocks/Posterior Thigh:  Calmoseptine to protect         Please note, all wounds (unless stated otherwise here) were mechanically debrided at the time of cleansing here in the wound-care center today, so a small amount of pain, drainage or bleeding from that process might be expected, and is normal.      All products for home use, including multiple products for a single wound if applicable, are medically necessary in order to achieve the best chance at timely wound healing. See provider documentation for details if needed. Substituted dressings applied in the [facility] today, if applicable:           New orders for this week (labs, imaging, medications, etc.):           Additional instructions for specific diagnoses:     Continue to use the HIBICLENS (or the generic version) after your bath on the areas that are more troublesome such as your knee, chest and even around the back wound, make sure you let it dry, do not rinse        F/U Appointment is with Dr. Nitza Sharma in 1 week  on                                   at                       .     Your nurse  is Heidi FLOWERS. If we applied slip-resistant hospital socks today, be sure to remove them at least once a day to inspect your toes or feet, even if you're not changing the wraps or dressings underneath. If you see anything concerning (redness, excess moisture, etc), please call and let us know right away.      Should you experience any significant changes in your wound(s) (including redness, increased warmth, increased pain, increased drainage, odor, or fever) or have questions about your wound care, please contact the [facility] at [phone] Monday-Thursday from 8:00 am - 4:30 pm, or Friday from 8:00 am - 2:30 pm.  If you need help with your wound outside these hours and cannot wait until we are again available, contact your home-care company (if applicable), your PCP, or go to the nearest emergency room

## 2021-05-24 NOTE — TELEPHONE ENCOUNTER
----- Message from Shaun Ovalle MD sent at 5/24/2021  2:51 PM EDT -----  Contact: Rolando Lacey   [phone]  Then wait for his insurance to approve  Cards office might have  ----- Message -----  From: Sravanthi Brewster  Sent: 5/24/2021   1:53 PM EDT  To: Shaun Ovalle MD    We do not have any samples of Entresto. Please advise.  ----- Message -----  From: Shaun Ovalle MD  Sent: 5/24/2021   1:43 PM EDT  To: Sravanthi Brewster    We should have samples  ----- Message -----  From: Tigist Bennett  Sent: 5/24/2021   1:06 PM EDT  To: Shaun Ovalle MD    FYI:  Patient has been out of [facility] for 2 weeks. Waiting for pre-auth form insurance. Is there something else he can take. What do you advise ?       BLM

## 2021-05-24 NOTE — CARE COORDINATION
[facility] 45 Transitions Follow Up Call    2021    Patient: Annette Beaulieu  Patient : 1965   MRN: [id number]  Reason for Admission: CHF  Discharge Date: 21 RARS: Readmission Risk Score: 25         Spoke with: Annette Beaulieu (patient)    Reports feeling well. Denies SOB, orthopnea, edema, fullness in abd. States he had labwork at [facility] last week and his results were all good. Cardiologist is Juan Carcamo at [facility] heart failure clinic. States the cardiologist at [facility] started him on Entresto in January and he has been taking without issues since then but about 2 weeks ago he was told that he was waiting on PA. Confirmed [facility] on [address] in [facility] is primary pharmacy. Last rx at discharge on 2021 by Yovany Pardo. Reviewed cardiology Dr Maria Elena Fitzpatrick note prior to recent discharge stating \"add aldactone as OP\". Per review, this med was not prescribed and patient confirms he does not have or take this med. CTN will call pharmacy to see what is needed to fill rx. Patient will call Juan Carcamo office to see if they have samples for a friend/family member to . Outreach to [employer] (spoke with Aki Dawson) who states PA is required and request was sent to prescriber. She will re-fax to atttn: Dr Vivienne Sidhu office. Patient notified by voicemail. CTN will follow up with him tomorrow. Chart routing to PCP as FYI that PA on Entresto being re-sent by Ludmila Mendoza and review of need for aldactone.      Care Transitions Subsequent and Final Call    Schedule Follow Up Appointment with PCP: Completed  Subsequent and Final Calls  Do you have any ongoing symptoms?: No  Have your medications changed?: Yes  Do you have any questions related to your medications?: Yes  Do you currently have any active services?: Yes  Are you currently active with any services?: Home Health, Outpatient/Community Services  Do you have any needs or concerns that I can assist you with?: Yes  Identified Barriers: Impairment  Care Transitions Interventions  Other Interventions:            Follow Up  Future Appointments   Date Time Provider Alyson Cardona   5/28/2021 10:15 AM MD Cheryl Chopra   6/1/2021  2:00 PM Jay Mack MD [facility]   6/4/2021 10:15 AM MD Cheryl Chopra RN

## 2021-05-25 NOTE — CARE COORDINATION
[facility] 45 Transitions Follow Up Call    2021    Patient: Lauren Hsieh  Patient : 1965   MRN: [id number]  Reason for Admission: CHF  Discharge Date: 21 RARS: Readmission Risk Score: 25       Spoke with: Lauren Hsihe (patient)    Patient states he spoke with PCP office about PA yesterday afternoon - they told him they filled out the paperwork and sent it in to insurance company. Said they have sent it 3 times. CTN will follow up with pharmacy this afaternoon to notify them and see when/if the rx can be filled/picked up. Patient in agreement with plan. Outreach to [facility], they don't see that PA has been approved yet. Savanna Perkins states in his experience it can take 1-2 business days. Outreach to [employer] ([phone]) as patient states is his CHF specialist. Per nurse triage, patient \"late canceled\" his last 2 appointments and has not been seen there since 2020. Per [place], patient was due for next appointment in 2021. States they do not do samples and would not anyway since patient is overdue for appointment. Reviewed that patient was admitted to the hospital in 2021. She will send message to the cardiology office and have them reach out to patient to schedule. She states they can access the medical records and do not need any documentation sent at this time. Outreach to patient to update him. Encouraged him to call his insurance company to follow up. States he did and they told him the online portal \"had a glitch\". He has not contacted insurance since the PCP re-sent the PA yesterday. He will call them again. CTN will call pharmacy again tomorrow to see if response from PA received.      Follow Up  Future Appointments   Date Time Provider Alyson Cardona   2021 10:15 AM MD Sabina Gonzalez   2021  2:00 PM Jay Shearer MD [facility]   2021 10:15 AM Javid Thomas MD [facility] Antionette Elliott Amol Abrams

## 2021-05-25 NOTE — PROGRESS NOTES
[facility] Progress Note    Hitesh Lara     : 1965    DATE OF VISIT:  2021    Subjective:     Hitesh Lara is a 50 y.o. male who has an infection-associated ulcer located on the chest (on the lower left and on the lower right). Significant symptoms or pertinent wound history since last visit: overall doing pretty well this week - cough definitely better with recent steroid inhaler, so he can lie supine more, so there's less stress on his lower chest wounds, and the buttock pressure ulcer. No F/C/D. No rash, sore throat or mouth, N/V/D from recent Abx. No CP or SOB. Additional ulcer(s) noted? yes. T-spine dehiscence, buttock pressure ulcer, left great toe; right knee and thigh healed. His current medication list consists of Alirocumab, Insulin Pen Needle, Insulin Syringe-Needle U-100, apixaban, aspirin, blood glucose test strips, cetirizine, cyclobenzaprine, docusate sodium, ferrous sulfate, fluticasone-vilanterol, insulin glargine, insulin lispro, magnesium oxide, metFORMIN, metoprolol succinate, nitroGLYCERIN, nystatin, pantoprazole, polyethylene glycol, promethazine, sacubitril-valsartan, senna, torsemide, traZODone, and vitamin D. Allergies: Benadryl [diphenhydramine hcl], Statins [statins], Cephalexin, Morphine, Penicillins, and Sulfa antibiotics    Objective:     Vitals:    21 1026   BP: 105/65   Pulse: 79   Resp: 18   Temp: 97.1 °F (36.2 °C)   TempSrc: Oral   Weight: 252 lb (114.3 kg)   Height: 6' 2\" (1.88 m)     AAOx3, overweight, NAD  No icterus, thrush, drug rash, abd tenderness  Mild-mod BL LE lymphedema  No cellulitis, angitis, fluctuance - much milder papular rash around the right lower chest wound  Chetna-ulcer skin: indurated, pink, warm and some mild reactive erythema and moisture at the back, some milder friction changes at the buttock.   Ulcer(s): T-spine with stable hardware exposure, a bit of recurrent hypergranulation at two screws; right chest smaller, red, granular to hypergranular, clean, less medial tunneling; left chest with two small sinus tracts, less inflammation, mostly a bit of serosanguinous drainage, just a bit of cloudiness medial; right knee healed; right thigh healed; left great toe very small, red and granular; right buttock small, red, granular, with healthier periwound skin. Photos also saved in electronic chart.     Today's Wound Measurements, per RN documentation:  Wound 02/19/21 #63, Right Chest Wall (inframammary), Abscess, Full Thickness, Onset 2/16/21-Wound Length (cm): 0.3 cm  Wound 03/19/21 #65, left great toe, traumatic, partial thickness, onset 3/17/21-Wound Length (cm): 0.4 cm  Wound 04/09/21 #66, Right Knee cluster, Trauma, Full Thickness, Onsret 4/7/21-Wound Length (cm): 0 cm  Wound 02/05/21 #62, Right Buttock cluster, Pressure Injury, Stage 2, Onset 1/15/21-Wound Length (cm): 1.7 cm  Wound 05/14/21 #70, left great toe nail, diabetic riggins 1, onset 5/14/21-Wound Length (cm): 0.3 cm  Wound 05/14/21 #71, left buttocks, pressure stage 2, onset 5/14/21-Wound Length (cm): 1.5 cm  Wound 04/09/21 #67, Left Chest Wall Cluster (inframmatory),Abcess, Full Thickness, Onsret 4/7/21-Wound Length (cm): 0.2 cm    Wound 02/19/21 #63, Right Chest Wall (inframammary), Abscess, Full Thickness, Onset 2/16/21-Wound Width (cm): 3 cm  Wound 03/19/21 #65, left great toe, traumatic, partial thickness, onset 3/17/21-Wound Width (cm): 0.5 cm  Wound 04/09/21 #66, Right Knee cluster, Trauma, Full Thickness, Onsret 4/7/21-Wound Width (cm): 0 cm  Wound 02/05/21 #62, Right Buttock cluster, Pressure Injury, Stage 2, Onset 1/15/21-Wound Width (cm): 1.5 cm  Wound 05/14/21 #70, left great toe nail, diabetic riggins 1, onset 5/14/21-Wound Width (cm): 0.1 cm  Wound 05/14/21 #71, left buttocks, pressure stage 2, onset 5/14/21-Wound Width (cm): 0.5 cm  Wound 04/09/21 #67, Left Chest Wall Cluster (inframmatory),Abcess, Full Thickness, Onsret 4/7/21-Wound Width (cm): 0.2 cm    Wound 02/19/21 #63, Right Chest Wall (inframammary), Abscess, Full Thickness, Onset 2/16/21-Wound Depth (cm): 0.4 cm  Wound 03/19/21 #65, left great toe, traumatic, partial thickness, onset 3/17/21-Wound Depth (cm): 0.1 cm  Wound 04/09/21 #66, Right Knee cluster, Trauma, Full Thickness, Onsret 4/7/21-Wound Depth (cm): 0 cm  Wound 02/05/21 #62, Right Buttock cluster, Pressure Injury, Stage 2, Onset 1/15/21-Wound Depth (cm): 0.1 cm  Wound 05/14/21 #70, left great toe nail, diabetic riggins 1, onset 5/14/21-Wound Depth (cm): 0.3 cm  Wound 05/14/21 #71, left buttocks, pressure stage 2, onset 5/14/21-Wound Depth (cm): 0.1 cm  Wound 04/09/21 #67, Left Chest Wall Cluster (inframmatory),Abcess, Full Thickness, Onsret 4/7/21-Wound Depth (cm): 0.7 cm    Assessment:     Patient Active Problem List   Diagnosis Code    Mixed hyperlipidemia E78.2    Coronary artery disease involving native coronary artery of native heart without angina pectoris I25.10    Paraplegia, complete ([facility]) G82.21    Chronic back pain M54.9, G89.29    Arthritis M19.90    Urinary tract infection associated with catheterization of urinary tract ([facility]) T83.511A, N39.0    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    Acute on chronic systolic congestive heart failure ([facility]) I50.23    LIBORIO on CPAP G47.33, Z99.89    Hyperglycemia due to diabetes mellitus (HCC) E11.65    Type 2 diabetes mellitus with diabetic peripheral angiopathy without gangrene, with long-term current use of insulin (HCC) E11.51, Z79.4    Ulcer of chest wall with fat layer exposed, following recent abscess L98.492    Chronic cough R05    Abscess of chest wall (left side) L02.213       Assessment of today's active condition(s): DM, paraplegia, lymphedema, multiple nonhealing ulcers -- good progress the last week or two; I hesitate to I&D the left chest now, with minimal cloudy drainage remaining, and knowing how hard it has been for him to heal the right chest wound. Factors contributing to occurrence and/or persistence of the chronic ulcer include lymphedema, diabetes, chronic pressure, decreased mobility and shear force. Medical necessity of today's visit is shown by the above documentation. Sharp debridement is not indicated today, based upon the exam findings in the wound(s) above. Procedure note:     Consent obtained. Time out performed per [facility]. Anesthetic  Anesthetic: 4% Lidocaine Cream     To encourage better epithelial cell coverage, I did use AgNO3 to chemically cauterize hypergranulation tissue on the chest (on the lower right) and back (midline) ulcer(s). This was tolerated well, with no significant pain or skin injury. Discharge plan:     Treatment in the wound care center today, per RN documentation: Wound 02/19/21 #63, Right Chest Wall (inframammary), Abscess, Full Thickness, Onset 2/16/21-Dressing/Treatment:  (kiran. ,silveralginate,ABD)  Wound 03/19/21 #65, left great toe, traumatic, partial thickness, onset 3/17/21-Dressing/Treatment:  (PSO,adaptic,2x2,tubulargauze)  Wound 04/09/21 #66, Right Knee cluster, Trauma, Full Thickness, Onsret 4/7/21-Dressing/Treatment:  (betadine,mepitelone,castpad x2,ace,cottonstockinette)  Wound 02/05/21 #62, Right Buttock cluster, Pressure Injury, Stage 2, Onset 1/15/21-Dressing/Treatment:  (calmoseptine)  Wound 05/14/21 #70, left great toe nail, diabetic riggins 1, onset 5/14/21-Dressing/Treatment:  (PSO,adaptic,2x2,tubular gauze)  Wound 05/14/21 #71, left buttocks, pressure stage 2, onset 5/14/21-Dressing/Treatment:  (calmoseptine)  Wound 04/09/21 #67, Left Chest Wall Cluster (inframmatory),Abcess, Full Thickness, Onsret 4/7/21-Dressing/Treatment:  (PSO, dry dressing ). Keep focused on glucose control, protein intake, offloading, maintaining current fluid balance. Just a protective stockinette to the right lower extremity now. Home treatment: good handwashing before and after any dressing changes. Cleanse wound with saline or soap & water before dressing change. May use Vaseline (petrolatum), Aquaphor, Aveeno, CeraVe, Cetaphil, Eucerin, Lubriderm, etc for dry skin. Dressing type for home: as above for the T-spine and right chest, TIW; as above for the buttocks and left chest daily and PRN; keep left toe dressing in place for the week. Written discharge instructions given to patient. Follow up in 1 week, primarily to make sure those chest wounds continue to improve.     Electronically signed by Gene Russo MD on 5/25/2021 at 1:45 PM.

## 2021-05-26 NOTE — CARE COORDINATION
[facility] Transitions Follow Up Call    2021    Patient: Jignesh Pop  Patient : 1965   MRN: [id number]  Reason for Admission: CHF  Discharge Date: 21 RARS: Readmission Risk Score: 25         Spoke with: Jignesh Pop (patient)    Outreach to Paulo Sorensen - no update on PA. Waiting on insurance company or prescriber. Outreach to [facility] - meli Renteria they sent the PA to insurance company most recently on  as soon as they got it faxed to them. Updated patient. He has no update from [employer]. Reports otherwise he has had HA since he ran out of [employer]. Monitors VS at home. States his diastolic BP is slightly elevated: yesterday 128/88, 2 days ago 125/90, 3 days ago it was low 80/52. Taking all other medications without missing doses. States he never misses doses. Appetite normal. Denies SOB, cp, palpitations. Following with Dr Sherryle Jasmine at [facility] (next appt  and they weigh him at his appointments). Active with Marshall Nieto as well. Outreach to [employer] at [place] and spoke with pharmacist Ruth Randall to see if she can offer any advice. (Initial script in 3/2021 was from [facility] to [facility] at [address] [place] [address].) She ran the claim and contacted the insurance company who states the initial rx in 3/2021 was a one time only PA and that they do have another \"in review' as of  with no indication when answer will come. She suggests writer or patient call [email or web address] to ask about 67 hour emergency supply since it is known the PA is in review. Outreach to [email or web address]. Spoke with the pharmacist Awa Charlton who states patient can pay OOP for any amount of pills he wants. Cost for 3 days worth is $67.28 and he may be reimbursed by insurance if the PA is approved same day or before he pays for this OOP. Updated patient. He will wait for response from PA.     Follow Up  Future Appointments   Date Time Provider Alyson Cardona   2021 10:15 AM MD Jayda Azar   6/1/2021  2:00 PM MD Lorenzo Carreno [facility]   6/4/2021 10:15 AM MD Jayda Azar \[facility] [place] [facility]\""       Russell Wright RN

## 2021-05-27 NOTE — CARE COORDINATION
[facility] 45 Transitions Follow Up Call    2021    Patient: Meghan Munguia  Patient : 1965   MRN: [id number]  Reason for Admission: CHF  Discharge Date: 21 RARS: Readmission Risk Score: 25         Spoke with: Meghan Munguia (patient)    CTN received inbound call from [facility] office (CHF specialist at [facility]) to schedule his appointment. Instructed her to contact patient directly to schedule appointments. She states she will call him. CTN received voice mail from patient that he received automated call from his med insurance provider ([employer]) that the PA for [facility] was DECLINED. CTN returned call to patient. States he would get a letter in 10 days stating the same. He is asking for assistance with what to do now. Is there a way to repeal this decision or another medication he should be on in the meantime? CTN checked with Care Transition team pharmacist who states that the patient could apply for PAP with . He is not eligible for grants as they are not accepting new patients and his insurance would have to cover some of the cost. He is agreeable to applying for PAP and the information was emailed to him at ([email or web address]) at this time. He has appt 2021 at 1:00PM for echocardiogram and appointment with Aki Gan immediately after at 2:15PM.    PCP notified of PA declined, increasing DBP and HA since stopping Entrested (per patient yesterday), preferred pharmacy and upcoming appt at [facility] HF office. Requesting if any additional recommendation to patient or CTN at this time. CTN will follow up with patient next week.      Follow Up  Future Appointments   Date Time Provider Alyson Cardona   2021 10:15 AM MD Sumeet Diggs   2021  2:00 PM Ness Luo MD [facility]   2021 10:15 AM MD Sumeet Diggs RN

## 2021-05-28 NOTE — PROGRESS NOTES
[facility] Physician Orders and Discharge [address]  [address], [address]  [place], [facility]  Telephone: [phone]      Fax: [phone]        Your home care company:   [facility]     Your wound-care supplies will be provided by:  Home care     NAME:  Kelechi Bojorquez   YOB: 1965  PRIMARY DIAGNOSIS FOR WOUND CARE CENTER:  Pressure Ulcer Stage 2     Wound cleansing:   Do not scrub or use excessive force. Wash hands with soap and water before and after dressing changes. Prior to applying a clean dressing, cleanse wound with normal saline, wound cleanser, or mild soap and water. Ask your physician or nurse before getting the wound(s) wet in the shower. Wound care for home:     Right Knee:   Betadine, Mepitel One  cast padding x 2  Ace then cotton stockinette  Leave on all week     Left great toe wound:  antibiotic ointment   adaptic    2x2   tubular gauze  Leave on all week     Right Chest wall Wound:  Calmoseptine to skin irritation  Silver Alginate tucked in to the wound  Cover with an absorptive dressing  Change 3 x week     Chronic Open Back Wound:   Dr. Ann Marie Alberto used Silver Nitrate on your wound today.   The wound will be black or silver in appearance  for the next several days, this is normal  Spray Hypochlorous Acid into wound let sit, do  Not rinse  Calmoseptine to alin wound  Silver Alginate for drainage as needed (we are applying today)  Cover with a Mepilex Border   Change 3 x week     Left Chest Wall Wound:   CONTINUE 2-3 times a day apply warm compress then apply small amount of antibiotic ointment (we do not want it dry it needs to continue to drain)  Antibiotic ointment   Dry dressing  Change Daily     Buttocks/Posterior Thigh:  Calmoseptine to protect         Please note, all wounds (unless stated otherwise here) were mechanically debrided at the time of cleansing here in the wound-care center today, so a small amount of pain, drainage or bleeding from that process might be expected, and is normal.      All products for home use, including multiple products for a single wound if applicable, are medically necessary in order to achieve the best chance at timely wound healing. See provider documentation for details if needed. Substituted dressings applied in the [facility] today, if applicable:           New orders for this week (labs, imaging, medications, etc.):           Additional instructions for specific diagnoses:     Continue to use the HIBICLENS (or the generic version) after your bath on the areas that are more troublesome such as your knee, chest and even around the back wound, make sure you let it dry, do not rinse        F/U Appointment is with Dr. Richie Pryor in 1 week  on                                   at                       .     Your nurse  is Heidi FLOWERS. If we applied slip-resistant hospital socks today, be sure to remove them at least once a day to inspect your toes or feet, even if you're not changing the wraps or dressings underneath. If you see anything concerning (redness, excess moisture, etc), please call and let us know right away.      Should you experience any significant changes in your wound(s) (including redness, increased warmth, increased pain, increased drainage, odor, or fever) or have questions about your wound care, please contact the [employer] at [phone] Monday-Thursday from 8:00 am - 4:30 pm, or Friday from 8:00 am - 2:30 pm.  If you need help with your wound outside these hours and cannot wait until we are again available, contact your home-care company (if applicable), your PCP, or go to the nearest emergency room

## 2021-05-28 NOTE — PLAN OF CARE
Steady improvement noted in wounds. No changes in wound care regime. Home care remains in place for intermittent wound care at home. Discharge instructions reviewed with patient, all questions answered, copy given to patient. Dressings were applied to all wounds per M.D. Instructions at this visit.

## 2021-06-02 NOTE — TELEPHONE ENCOUNTER
----- Message from Miriam Dec sent at 6/2/2021  4:52 PM EDT -----  Contact: Guadalupe Owusu [phone]  Per Dr Polanco Bolds the recommendation is 1 puff, he will not prescribe 2 puffs.  ----- Message -----  From: Carl Barry  Sent: 6/2/2021   3:30 PM EDT  To: MD Dr. Anita Flores patient. ... Patient states when he was  in the hospital the instructions for the fluticasone-vilanterol (BREO ELLIPTA) 100-25 MCG/INH AEPB inhaler were to use 2 x's per day. The prescription written for the pharmacy states 1 x per day. His insurance will not pay for this medication unless it states 2 x's per day.  Thank you

## 2021-06-02 NOTE — TELEPHONE ENCOUNTER
----- Message from Davon Hickey MD sent at 6/2/2021  1:07 PM EDT -----  Yes  ----- Message -----  From: Betty Marley  Sent: 6/2/2021  10:58 AM EDT  To: Davon Hickey MD    Pt states he was taking two puffs in hospital.  Still change to 1 puff daily?  ----- Message -----  From: Davon Hickey MD  Sent: 6/2/2021  10:44 AM EDT  To: Betty Marley    He should only take one puff daily  ----- Message -----  From: Betty Marley  Sent: 6/2/2021  10:27 AM EDT  To: MD Dr. Linn Lux pt  Pharmacy called because pt states he takes Breo 2 puffs daily but pharmacy has script that says 1 puff daily. Ok to change to 2 puffs daily? This is what it says in his chart just with a comment that patient takes differently- 1 puff daily.     Donald Washington none

## 2021-06-03 NOTE — CARE COORDINATION
[facility] Transitions Follow Up Call    6/3/2021    Patient: Danny Raymundo  Patient : 1965   MRN: [id number]  Reason for Admission: CHF  Discharge Date: 21 RARS: Readmission Risk Score: 25         Spoke with: Danny Raymundo (patient)    Patient reports after our call last week that he received a letter from his insurance company reiterating that his Lanis Annabellein was denied. He then received an automated call from insurance company earlier this week that it was approved and he states he has been taking as prescribed since Monday, . He denies other needs/concerns at this time. He has CTN contact info for future needs. No further CTN outreach scheduled at this time. Care Transitions Subsequent and Final Call    Schedule Follow Up Appointment with PCP: Completed  Subsequent and Final Calls  Do you have any ongoing symptoms?: No  Have your medications changed?: Yes  Do you have any questions related to your medications?: No  Do you currently have any active services?: Yes  Are you currently active with any services?: Home Health, Outpatient/Community Services  Do you have any needs or concerns that I can assist you with?: No  Identified Barriers: Impairment  Care Transitions Interventions  Other Interventions:            Follow Up  Future Appointments   Date Time Provider Alyson Cardona   2021 10:15 AM Jurline Brunner, MD [facility]   6/10/2021 10:00 AM MD Pinky Gonzales [facility]   2021 10:15 AM Jurline Brunner, MD [facility]   2021 10:15 AM Jurline Brunner, MD [facility]   2021 10:15 AM Jurline Brunner, MD [facility]       Layla Boyd RN

## 2021-06-04 NOTE — PLAN OF CARE
I&D to left chest today  MD applied iodoform packing today home care to change 3 x week. No other changes in wound care regime. Follow up in 1 week in wound clinic. Discharge instructions reviewed with patient, all questions answered, copy given to patient. Dressings were applied to all wounds per M.D. Instructions at this visit.

## 2021-06-04 NOTE — PROGRESS NOTES
[facility] Physician Orders and Discharge [address], [address] [facility], [facility]  Telephone: [phone]      Fax: [phone]        Your home care [facility] wound-care supplies will be provided by: [facility] care     NAME: Boo Saldivar Jr   DATE of BIRTH:  1965  PRIMARY DIAGNOSIS FOR WOUND CARE CENTER:  Pressure Ulcer Stage 2     Wound cleansing:   Do not scrub or use excessive force. Wash hands with soap and water before and after dressing changes. Prior to applying a clean dressing, cleanse wound with normal saline, wound cleanser, or mild soap and water.  Ask your physician or nurse before getting the wound(s) wet in the shower.                Wound care for home:     Right Knee:   Betadine, Mepitel One  cast padding x 2  Ace then cotton stockinette  Leave on all week     Left great toe wound:  antibiotic ointment   adaptic    2x2   tubular gauze  Leave on all week     Right Chest wall Wound:  Calmoseptine to skin irritation  Silver Alginate tucked in to the wound  Cover with an absorptive dressing  Change 3 x week     Chronic Open Back Wound:   Dr. Josephine Vernon used Silver Nitrate on your wound today.  The wound will be black or silver in appearance  for the next several days, this is normal  Spray Hypochlorous Acid into wound let sit, do not rinse  Calmoseptine to alin wound  Silver Alginate for drainage as needed (we are applying today)  Cover with a Mepilex Border   Change 3 x week     Left Chest Wall Wound:   Pack Gently with Iodoform and antibiotic ointment (Dr. Josephnie Vernon applied dressing today)  Cover with a dry dressing  Change 3 x week      Buttocks/Posterior Thigh:  Calmoseptine to alin wound  Spray Hypochlorous Acid into wound let sit, do not rinse  Silver Alginate  Mepilex Border  Change 3 x week   Cover with         Please note, all wounds (unless stated otherwise here) were mechanically debrided at the time of cleansing here in the wound-care center today, so a small amount of pain, drainage or bleeding from that process might be expected, and is normal.      All products for home use, including multiple products for a single wound if applicable, are medically necessary in order to achieve the best chance at timely wound healing. See provider documentation for details if needed.     Substituted dressings applied in the [facility] today, if applicable:           New orders for this week (labs, imaging, medications, etc.):           Additional instructions for specific diagnoses:     Continue to use the HIBICLENS (or the generic version) after your bath on the areas that are more troublesome such as your knee, chest and even around the back wound, make sure you let it dry, do not rinse        F/U Appointment is with Dr. Delfina Galeas in 1 week [facility]                       .     Your nurse  is Heidi FLOWERS.      If we applied slip-resistant hospital socks today, be sure to remove them at least once a day to inspect your toes or feet, even if you're not changing the wraps or dressings underneath.  If you see anything concerning (redness, excess moisture, etc), please call and let us know right away.     Should you experience any significant changes in your wound(s) (including redness, increased warmth, increased pain, increased drainage, odor, or fever) or have questions about your wound care, please contact the [address] [place] [address] at [phone] Monday-Thursday from 8:00 am - 4:30 pm, or Friday from 8:00 am - 2:30 pm.  If you need help with your wound outside these hours and cannot wait until we are again available, contact your home-care company (if applicable), your PCP, or go to the nearest emergency room

## 2021-06-06 PROBLEM — J45.40 MODERATE PERSISTENT ASTHMA WITHOUT COMPLICATION: Status: ACTIVE | Noted: 2021-04-17

## 2021-06-06 NOTE — PROGRESS NOTES
buttocks. Ulcer(s): T-spine with usual small amount of hypergranulation at 4 exposed screws; right chest wall smaller, still some depth but it's epithelializing, some inferior hypergranulation; left chest just two small sinus tracts, serosanguinous drainage; buttock ulcers stage 2, mildly inflamed, red, granular, serous exudate; great toe small, red, granular; left great toe nail thickened, dull in color, dystrophic, partly loosened from the nailbed with a bit of drainage and superficial skin loss there. Photos also saved in electronic chart.     Today's Wound Measurements, per RN documentation:  Wound 02/19/21 #63, Right Chest Wall (inframammary), Abscess, Full Thickness, Onset 2/16/21-Wound Length (cm): 0.3 cm  Wound 03/19/21 #65, left great toe, traumatic, partial thickness, onset 3/17/21-Wound Length (cm): 0.4 cm  [REMOVED] Wound 04/09/21 #66, Right Knee cluster, Trauma, Full Thickness, Onsret 4/7/21-Wound Length (cm): 0 cm  Wound 02/05/21 #62, Right Buttock cluster, Pressure Injury, Stage 2, Onset 1/15/21-Wound Length (cm): 0.6 cm  Wound 05/14/21 #70, left great toe nail, diabetic riggins 1, onset 5/14/21-Wound Length (cm): 0.4 cm  Wound 05/14/21 #71, left buttocks, pressure stage 2, onset 5/14/21-Wound Length (cm): 0.3 cm  Wound 04/09/21 #67, Left Chest Wall Cluster (inframmatory),Abcess, Full Thickness, Onsret 4/7/21-Wound Length (cm): 0.2 cm    Wound 02/19/21 #63, Right Chest Wall (inframammary), Abscess, Full Thickness, Onset 2/16/21-Wound Width (cm): 3 cm  Wound 03/19/21 #65, left great toe, traumatic, partial thickness, onset 3/17/21-Wound Width (cm): 0.5 cm  [REMOVED] Wound 04/09/21 #66, Right Knee cluster, Trauma, Full Thickness, Onsret 4/7/21-Wound Width (cm): 0 cm  Wound 02/05/21 #62, Right Buttock cluster, Pressure Injury, Stage 2, Onset 1/15/21-Wound Width (cm): 0.5 cm  Wound 05/14/21 #70, left great toe nail, diabetic riggins 1, onset 5/14/21-Wound Width (cm): 0.1 cm  Wound 05/14/21 #71, left buttocks, pressure stage 2, onset 5/14/21-Wound Width (cm): 0.7 cm  Wound 04/09/21 #67, Left Chest Wall Cluster (inframmatory),Abcess, Full Thickness, Onsret 4/7/21-Wound Width (cm): 0.2 cm    Wound 02/19/21 #63, Right Chest Wall (inframammary), Abscess, Full Thickness, Onset 2/16/21-Wound Depth (cm): 0.5 cm  Wound 03/19/21 #65, left great toe, traumatic, partial thickness, onset 3/17/21-Wound Depth (cm): 0.1 cm  [REMOVED] Wound 04/09/21 #66, Right Knee cluster, Trauma, Full Thickness, Onsret 4/7/21-Wound Depth (cm): 0 cm  Wound 02/05/21 #62, Right Buttock cluster, Pressure Injury, Stage 2, Onset 1/15/21-Wound Depth (cm): 0.1 cm  Wound 05/14/21 #70, left great toe nail, diabetic riggins 1, onset 5/14/21-Wound Depth (cm): 0.2 cm  Wound 05/14/21 #71, left buttocks, pressure stage 2, onset 5/14/21-Wound Depth (cm): 0.1 cm  Wound 04/09/21 #67, Left Chest Wall Cluster (inframmatory),Abcess, Full Thickness, Onsret 4/7/21-Wound Depth (cm): 0.3 cm    Assessment:     Patient Active Problem List   Diagnosis Code    Mixed hyperlipidemia E78.2    Coronary artery disease involving native coronary artery of native heart without angina pectoris I25.10    Paraplegia, complete (HCC) G82.21    Chronic back pain M54.9, G89.29    Arthritis M19.90    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    Acute on chronic systolic congestive heart failure (HCC) I50.23    LIBORIO on CPAP G47.33, Z99.89    Hyperglycemia due to diabetes mellitus (HCC) E11.65    Type 2 diabetes mellitus with diabetic peripheral angiopathy without gangrene, with long-term current use of insulin ([facility]) E11.51, Z79.4    Ulcer of chest wall with fat layer exposed, following recent abscess L98.492    Moderate persistent asthma without complication Y05.86    Abscess of chest wall (left side) L02.213       Assessment of today's active condition(s): DM, paraplegia, lymphedema, multiple nonhealing wounds, generally some decent slow progress lately, especially as he's been able to lie more horizontally in bed; one additional ulcer healed this week. No real necrotic tissue to debride anywhere, but that loose, dystrophic nail needs to be removed, I believe, because of the drainage and epidermal loss from beneath it. Factors contributing to occurrence and/or persistence of the chronic ulcer include lymphedema, diabetes, chronic pressure, decreased mobility, shear force and obesity. Medical necessity of today's visit is shown by the above documentation. Sharp debridement is not indicated today, based upon the exam findings in the wound(s) above. Procedure note:     Consent obtained. Time out performed per [facility] policy. Anesthetic  Anesthetic: 4% Lidocaine Cream     To encourage better epithelial cell coverage, I did use AgNO3 to chemically cauterize hypergranulation tissue on the chest (on the lower right) and back (midline) ulcer(s). This was tolerated well, with no significant pain or skin injury. _________________    I then used a #15 blade and a pair of forceps to avulse the left great toe nail from the underlying nail bed; accomplished with a small amount of bleeding, stopped with manual pressure; no pain, given his spinal cord injury. Small nailbed ulcer noted beneath, partial thickness, clean, no signs of infection or major necrosis. Tolerated well overall.     Discharge plan:     Treatment in the wound care center today, per RN documentation: Wound 02/19/21 #63, Right Chest Wall (inframammary), Abscess, Full Thickness, Onset 2/16/21-Dressing/Treatment: Other (comment) (kiran alin,OpAg,drydressing)  Wound 03/19/21 #65, left great toe, traumatic, partial thickness, onset 3/17/21-Dressing/Treatment: Other (comment) (pso/adaptic/2x2,tubulargauze)  Wound 02/05/21 #62, Right Buttock cluster, Pressure Injury, Stage 2, Onset 1/15/21-Dressing/Treatment: Other (comment) (calmoseptine )  Wound 05/14/21 #70, left great toe nail, diabetic riggins 1, onset 5/14/21-Dressing/Treatment: Other (comment) (pso/adaptic/2x2,tubulargauze)  Wound 05/14/21 #71, left buttocks, pressure stage 2, onset 5/14/21-Dressing/Treatment: Other (comment) (calmoseptine )  Wound 04/09/21 #67, Left Chest Wall Cluster (inframmatory),Abcess, Full Thickness, Onsret 4/7/21-Dressing/Treatment: Other (comment) (pso/dry dressing). No systemic Abx needed for now. Continue focus on protein intake, glucose control, offloading. Continue warm compresses to left chest to encourage drainage from the residual cavity after the recent abscess there. Left great toe dressing can be kept in place for the week. Simple stockinette to give a bit of protection to the right knee. Home treatment: good handwashing before and after any dressing changes. Cleanse wound with saline or soap & water before dressing change. May use Vaseline (petrolatum), Aquaphor, Aveeno, CeraVe, Cetaphil, Eucerin, Lubriderm, etc for dry skin. Dressing type for home: as above for the T-spine and right chest TIW; left chest and buttocks daily and PRN. Written discharge instructions given to patient. Follow up in 1 week.     Electronically signed by Saba Espinal MD on 6/6/2021 at 2:29 PM.

## 2021-06-08 PROBLEM — L89.312: Status: ACTIVE | Noted: 2019-03-05

## 2021-06-08 NOTE — PROGRESS NOTES
[address] [facility] Progress Note    Shara Choi     : 1965    DATE OF VISIT:  2021    Subjective:     Shara Choi is a 50 y.o. male who has a dehisced surgical ulcer located on the back (midline). Significant symptoms or pertinent wound history since last visit: overall doing ok this week. Had some concerns about hyperglycemia and cloudy urine last week, but the glucoses are a bit better this week (mid-high 100s). No abd pain, N/V/D. No F/C/D. Appetite good. Left chest abscess, which had been barely draining last week, now has some increased purulent and malodorous drainage again this week, so I think we'll need to I&D that after all (and it's very possible that was the source of the hyperglycemia, along with chronic and fluctuating infection at his T-spine wound, not the urinary tract after all). Additional ulcer(s) noted? yes. Left great toe ulcer almost healed; right ischial pressure ulcer a bit more significant after extra time up in his chair last weekend (family graduation ceremonies). Also right chest abscess-related wound, getting close to being healed. Right knee very fragile, but still healed for now. His current medication list consists of Alirocumab, Insulin Pen Needle, Insulin Syringe-Needle U-100, apixaban, aspirin, blood glucose test strips, cetirizine, cyclobenzaprine, docusate sodium, ferrous sulfate, fluticasone-vilanterol, insulin glargine, insulin lispro, magnesium oxide, metFORMIN, metoprolol succinate, nitroGLYCERIN, nystatin, pantoprazole, polyethylene glycol, promethazine, sacubitril-valsartan, senna, torsemide, traZODone, and vitamin D. No current abx.     Allergies: Benadryl [diphenhydramine hcl], Statins [statins], Cephalexin, Morphine, Penicillins, and Sulfa antibiotics    Objective:     Vitals:    21 1030   BP: (!) 90/57   Pulse: 76   Resp: 16   Temp: 97.2 °F (36.2 °C)   TempSrc: Oral   Weight: Comment: ADAM weight   Height: 6' 2\" (1.88 m)     AAOx3, overweight, fatigued, NAD  No cellulitis, angitis; a sense of fluctuance at the left inframammary abscess site still  Stable LE lymphedema; left foot a bit cool, slow cap refill, but stable for him  Chetna-ulcer skin: improved contact or fungal dermatitis at right chest; pink and healthy at left chest; cristina erythema and fragile at the T-spine; hyperkeratotic and indurated at the left toe; buttock-ischium with some friction changes, erythema. Ulcer(s): T-spine with usual exposed hardware; hypergranulation tissue at two screws, a bit more purulent drainage this week; right chest still a bit hypergranular, but significantly more epidermal coverage this week; left side just two tiny openings, one barely draining, the other with some thick purulence; ischial pressure ulcer a bit larger, still stage 2, red, inflamed, a bit of fibrin and biofilm; left great toe small, red, granular, clean. Photos also saved in electronic chart.     Today's wound measurements, per RN documentation:  Wound 05/14/21 #70, left great toe nail, diabetic riggins 1, onset 5/14/21-Wound Length (cm): 0.4 cm  Wound 05/14/21 #71, left buttocks, pressure stage 2, onset 5/14/21-Wound Length (cm): 1.5 cm  Wound 04/09/21 #67, Left Chest Wall Cluster (inframmatory),Abcess, Full Thickness, Onsret 4/7/21-Wound Length (cm): 0.4 cm  Wound 03/19/21 #65, left great toe, traumatic, partial thickness, onset 3/17/21-Wound Length (cm): 0.5 cm  Wound 02/19/21 #63, Right Chest Wall (inframammary), Abscess, Full Thickness, Onset 2/16/21-Wound Length (cm): 0.7 cm  Wound 02/05/21 #62, Right Buttock cluster, Pressure Injury, Stage 2, Onset 1/15/21-Wound Length (cm): 4 cm    Wound 05/14/21 #70, left great toe nail, diabetic riggins 1, onset 5/14/21-Wound Width (cm): 0.3 cm  Wound 05/14/21 #71, left buttocks, pressure stage 2, onset 5/14/21-Wound Width (cm): 1.5 cm  Wound 04/09/21 #67, Left Chest Wall Cluster (inframmatory),Abcess, Full Thickness, Onsret right [facility]) L89.312    Hyperglycemia due to diabetes mellitus ([facility] [address] E11.65    Type 2 diabetes mellitus with diabetic peripheral angiopathy without gangrene, with long-term current use of insulin ([facility]) E11.51, Z79.4    Ulcer of chest wall with fat layer exposed, following recent abscess L98.492    Moderate persistent asthma without complication A47.88    Abscess of chest wall (left side) L02.213       Assessment of today's active condition(s): DM, paraplegia, lymphedema, multiple nonhealing wounds, but doing ok this week -- T-spine is overall stable, right chest almost healed, left chest in need of a minor I&D finally; right ischium a bit worse but should improve quickly; toe also doing ok. Factors contributing to occurrence and/or persistence of the chronic ulcer include lymphedema, diabetes, chronic pressure, decreased mobility, shear force and obesity. Medical necessity of today's visit is shown by the above documentation. Sharp debridement is indicated today, based upon the exam findings in the wound(s) above. Procedure note:     Consent obtained. Time out performed per [facility]. Anesthetic  Anesthetic: 2% Lidocaine Injectable with Epinephrine into the ID and SQ space at the left chest wall abscess, 2 mL in total.    Using a curette, I sharply debrided the right ischium ulcer(s) down through and including the removal of dermis. The type(s) of tissue debrided included fibrin and biofilm. Total Surface Area Debrided: 8 sq cm. I then used a #15 blade to make an \"X\"-shaped incision through the more medial sinus tract of that left chest wall process, approx 1 x 1 cm in size overall. I was able to pop into a decent-sized SQ cavity at that point, about 1.5 x 3 cm in size, extending lateral underneath that other skin opening. A small to moderate amount of yellow purulence was expressed. The ulcers were then irrigated with normal saline solution.  The procedure was completed with a small amount of bleeding, and hemostasis was with pressure and with silver nitrate stick(s). The patient tolerated the procedure well, with no significant complications. The patient's level of pain during and after the procedure was monitored. Post-debridement measurements, if different from pre-debridement, are in the flowsheet as well.  _________________________    To encourage better epithelial cell coverage, I did use AgNO3 to chemically cauterize hypergranulation tissue on the chest (on the lower right) and back (midline) ulcer(s), after application of 4% lidocaine topical solution. This was tolerated well, with no pain or skin injury. Discharge plan:     Treatment in the wound care center today, per RN documentation: Wound 05/14/21 #70, left great toe nail, diabetic riggins 1, onset 5/14/21-Dressing/Treatment:  (PSO,adaptic,2x2,tubular gauze)  Wound 05/14/21 #71, left buttocks, pressure stage 2, onset 5/14/21-Dressing/Treatment:  (kiran. , vashe,opticell,border)  Wound 03/19/21 #65, left great toe, traumatic, partial thickness, onset 3/17/21-Dressing/Treatment:  (PSO,adaptic,2x2,tubular gauze)  Wound 02/19/21 #63, Right Chest Wall (inframammary), Abscess, Full Thickness, Onset 2/16/21-Dressing/Treatment:  (kiran.,opticell,dry dressing)  Wound 02/05/21 #62, Right Buttock cluster, Pressure Injury, Stage 2, Onset 1/15/21-Dressing/Treatment:  (kiran. , vashe,opticell,border). Leave the left great toe dressing in place for the week. To the left chest, 1/4\" Iodoform packing and a small Mepilex border. Cotton stockinette to the right knee. Keep working on protein intake, glucose control, offloading of course. Next week if his glucoses are still up, especially with cloudy urine or any abd symptoms, will get a UA, UCx, PSA, renal U/S. Always need to keep in mind that the T-spine wound could flare up with a bit of acute infection also. Home treatment: good handwashing before and after any dressing changes.  Cleanse wound with saline or soap & water before dressing change. May use Vaseline (petrolatum), Aquaphor, Aveeno, CeraVe, Cetaphil, Eucerin, Lubriderm, etc for dry skin. Dressing type for home: as above for the T-spine, left chest and right chest three times weekly, the buttock every day or two as needed, . Written discharge instructions given to patient. Follow up in 1 week.     Electronically signed by Milla Monzon MD on 6/8/2021 at 12:05 PM.
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
## 2021-06-11 NOTE — PLAN OF CARE
[facility] Physician Orders and Discharge [address], [address] [facility], [facility]  Telephone: [phone]      Fax: [phone]        Your home care [address] [facility] wound-care supplies will be provided by: [facility] care     NAME: Amy Barraza Jr   DATE of BIRTH:  1965  PRIMARY DIAGNOSIS FOR WOUND CARE CENTER:  Pressure Ulcer Stage 2     Wound cleansing:   Do not scrub or use excessive force. Wash hands with soap and water before and after dressing changes. Prior to applying a clean dressing, cleanse wound with normal saline, wound cleanser, or mild soap and water.  Ask your physician or nurse before getting the wound(s) wet in the shower.                Wound care for home:     Right Knee:   Betadine, Mepitel One  cast padding x 2  Ace then cotton stockinette  Leave on all week     Left great toe wound:  antibiotic ointment   adaptic    2x2   tubular gauze  Leave on all week     Right Chest wall Wound:  Calmoseptine to skin irritation  Silver Alginate tucked in to the wound  Cover with an absorptive dressing  Change 3 x week     Chronic Open Back Wound:   Dr. Vane Coreas used Silver Nitrate on your wound today.  The wound will be black or silver in appearance  for the next several days, this is normal  Spray Hypochlorous Acid into wound let sit, do not rinse  Calmoseptine to alin wound  Silver Alginate for drainage as needed (we are applying today)  Cover with a Mepilex Border   Change 3 x week     Left Chest Wall Wound:   Pack Gently with Iodoform and antibiotic ointment (Dr. Vane Coreas applied dressing today)  Cover with a dry dressing  Change 3 x week      Buttocks/Posterior Thigh:  Calmoseptine to alin wound  Spray Hypochlorous Acid into wound let sit, do not rinse  Silver Alginate  Mepilex Border  Change 3 x week   Cover with         Please note, all wounds (unless stated otherwise here) were mechanically debrided at the time of cleansing here in the wound-care center today, so a small amount of pain, drainage or bleeding from that process might be expected, and is normal.      All products for home use, including multiple products for a single wound if applicable, are medically necessary in order to achieve the best chance at timely wound healing. See provider documentation for details if needed.     Substituted dressings applied in the [facility] today, if applicable:           New orders for this week (labs, imaging, medications, etc.):           Additional instructions for specific diagnoses:     Continue to use the HIBICLENS (or the generic version) after your bath on the areas that are more troublesome such as your knee, chest and even around the back wound, make sure you let it dry, do not rinse        F/U Appointment is with Dr. Marcia Kathleen in 1 week [address]                       .     Your nurse  is Heidi FLOWERS.      If we applied slip-resistant hospital socks today, be sure to remove them at least once a day to inspect your toes or feet, even if you're not changing the wraps or dressings underneath.  If you see anything concerning (redness, excess moisture, etc), please call and let us know right away.     Should you experience any significant changes in your wound(s) (including redness, increased warmth, increased pain, increased drainage, odor, or fever) or have questions about your wound care, please contact the [facility] at [phone] Monday-Thursday from 8:00 am - 4:30 pm, or Friday from 8:00 am - 2:30 pm.  If you need help with your wound outside these hours and cannot wait until we are again available, contact your home-care company (if applicable), your PCP, or go to the nearest emergency room

## 2021-06-14 NOTE — PLAN OF CARE
Patient wounds slowly improving and chronic wound to back with hardware exposure remains stable. Home care continues to provide intermittent care in the home for dressing changes. Patient did see Pulmonology and reports he is to have a Lung Function text in the next few weeks. Patient did state that his cough is better and he is able to offload more tolerating position changes without coughing. Discharge instructions reviewed with patient, all questions answered, copy given to patient. Dressings were applied to all wounds per M.D. Instructions at this visit.
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
## 2021-06-18 NOTE — PLAN OF CARE
Left chest wound incised today per MD after lidocaine injection as noted on Flow sheet. Patient tolerated well. Small changes in wound care regime today as noted on AVS, otherwise follow up in 1 week, home care remains in place for intermittent wound care at home. Discharge instructions reviewed with patient, all questions answered, copy given to patient. Dressings were applied to all wounds per M.D. Instructions at this visit.

## 2021-06-18 NOTE — PROGRESS NOTES
[address] [facility] Physician Orders and Discharge [address]  [address]  [place], [facility]  Telephone: [phone]      Fax: [phone]        Your home care company:   [facility]     Your wound-care supplies will be provided by:  Home care     NAME:  Lien Graves   YOB: 1965  PRIMARY DIAGNOSIS FOR WOUND CARE CENTER:  Pressure Ulcer Stage 2     Wound cleansing:   Do not scrub or use excessive force. Wash hands with soap and water before and after dressing changes. Prior to applying a clean dressing, cleanse wound with normal saline, wound cleanser, or mild soap and water. Ask your physician or nurse before getting the wound(s) wet in the shower.                 Wound care for home:     Right Knee:   Vashe, Polysporin, Mepitel One  cast padding x 2  Ace then cotton stockinette  Leave on all week     Right Chest wall Wound:  Calmoseptine to alin wound  Silver Alginate tucked in to the wound  Cover with an absorptive dressing  Change 3 x week    Left Chest Wall Wound:   Pack Gently with Iodoform   Calmoseptine to alin wound  Cover with a dry dressing  Change 3 x week      Chronic Open Back Wound:   Spray Hypochlorous Acid into wound let sit, do not rinse  Calmoseptine to alin wound  Silver Alginate for drainage as needed (we are applying today)  Cover with a Mepilex Border   Change 3 x week     Buttocks/Posterior Thigh:  Calmoseptine to alin wound  Spray Hypochlorous Acid into wound let sit, do not rinse  Silver Alginate  Mepilex Border  Change 3 x week   Cover with         Please note, all wounds (unless stated otherwise here) were mechanically debrided at the time of cleansing here in the wound-care center today, so a small amount of pain, drainage or bleeding from that process might be expected, and is normal.      All products for home use, including multiple products for a single wound if applicable, are medically necessary in order to achieve the best chance at timely wound healing. See provider documentation for details if needed. Substituted dressings applied in the [facility] today, if applicable:           New orders for this week (labs, imaging, medications, etc.):           Additional instructions for specific diagnoses:     Continue to use the HIBICLENS (or the generic version) after your bath on the areas that are more troublesome such as your knee, chest and even around the back wound, make sure you let it dry, do not rinse        F/U Appointment is with Dr. Vane Coreas in 1 week  on                                   at                       .     Your nurse  is Heidi FLOWERS. If we applied slip-resistant hospital socks today, be sure to remove them at least once a day to inspect your toes or feet, even if you're not changing the wraps or dressings underneath. If you see anything concerning (redness, excess moisture, etc), please call and let us know right away.      Should you experience any significant changes in your wound(s) (including redness, increased warmth, increased pain, increased drainage, odor, or fever) or have questions about your wound care, please contact the [employer] at [phone] Monday-Thursday from 8:00 am - 4:30 pm, or Friday from 8:00 am - 2:30 pm.  If you need help with your wound outside these hours and cannot wait until we are again available, contact your home-care company (if applicable), your PCP, or go to the nearest emergency room

## 2021-06-21 NOTE — PROGRESS NOTES
slow  Mild-mod BL LE stage 2 lymphedema  Also a small focus of fluctuance on the T-spine this week, at about the 8:00 position, between two exposed screws - I would imagine that's probably a small, trapped pocket of purulence, but manual pressure on that area didn't lead to any increased drainage from the skin openings  Chetna-ulcer skin: indurated, pink, erythematous , warm and a bit of flaking hyperkeratosis at the right knee. Ulcer(s): T-spine with 4 screw heads exposed, minor hypergranulation this week, small amount of seropurulence; right chest small, red, hypergranular, almost healed; left chest with more drainage this week on manual pressure, very small I&D wound granular; right knee a cluster of partial thickness, clean, red, granular ulcers; left great toe healed; ischial pressure ulcer stage 2, red, clean, some mild epidermal friction changes. Photos also saved in electronic chart.     Today's Wound Measurements, per RN documentation:  [REMOVED] Wound 05/14/21 #70, left great toe nail, diabetic riggins 1, onset 5/14/21-Wound Length (cm): 0 cm  Wound 02/05/21 #62, Right Buttock cluster, Pressure Injury, Stage 2, Onset 1/15/21-Wound Length (cm): 1.5 cm  Wound 05/14/21 #71, left buttocks, pressure stage 2, onset 5/14/21-Wound Length (cm): 1 cm  Wound 04/09/21 #67, Left Chest Wall Cluster (inframmatory),Abcess, Full Thickness, Onsret 4/7/21-Wound Length (cm): 0.5 cm  Wound 02/19/21 #63, Right Chest Wall (inframammary), Abscess, Full Thickness, Onset 2/16/21-Wound Length (cm): 1 cm    [REMOVED] Wound 05/14/21 #70, left great toe nail, diabetic riggins 1, onset 5/14/21-Wound Width (cm): 0 cm  Wound 02/05/21 #62, Right Buttock cluster, Pressure Injury, Stage 2, Onset 1/15/21-Wound Width (cm): 0.4 cm  Wound 05/14/21 #71, left buttocks, pressure stage 2, onset 5/14/21-Wound Width (cm): 0.7 cm  Wound 04/09/21 #67, Left Chest Wall Cluster (inframmatory),Abcess, Full Thickness, Onsret 4/7/21-Wound Width (cm): 0.4 cm  Wound 02/19/21 #63, Right Chest Wall (inframammary), Abscess, Full Thickness, Onset 2/16/21-Wound Width (cm): 3.2 cm    [REMOVED] Wound 05/14/21 #70, left great toe nail, diabetic riggins 1, onset 5/14/21-Wound Depth (cm): 0 cm  Wound 02/05/21 #62, Right Buttock cluster, Pressure Injury, Stage 2, Onset 1/15/21-Wound Depth (cm): 0.1 cm  Wound 05/14/21 #71, left buttocks, pressure stage 2, onset 5/14/21-Wound Depth (cm): 0.1 cm  Wound 04/09/21 #67, Left Chest Wall Cluster (inframmatory),Abcess, Full Thickness, Onsret 4/7/21-Wound Depth (cm): 0.8 cm  Wound 02/19/21 #63, Right Chest Wall (inframammary), Abscess, Full Thickness, Onset 2/16/21-Wound Depth (cm): 0.5 cm    Assessment:     Patient Active Problem List   Diagnosis Code    Mixed hyperlipidemia E78.2    Coronary artery disease involving native coronary artery of native heart without angina pectoris I25.10    Paraplegia, complete ([facility]) G82.21    Chronic back pain M54.9, G89.29    Arthritis M19.90    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    Acute on chronic systolic congestive heart failure ([facility]) I50.23    LIBORIO on CPAP G47.33, Z99.89    Pressure ulcer of ischium, stage 2, right ([facility]) L89.312    Hyperglycemia due to diabetes mellitus ([facility]) E11.65    Type 2 diabetes mellitus with diabetic peripheral angiopathy without gangrene, with long-term current use of insulin ([facility]) E11.51, Z79.4    Ulcer of chest wall with fat layer exposed, following recent abscess L98.492    Moderate persistent asthma without complication D24.69    Abscess of chest wall (left side) L02.213       Assessment 02/05/21 #62, Right Buttock cluster, Pressure Injury, Stage 2, Onset 1/15/21-Dressing/Treatment:  (vashe,opticell, kiran, dry dressing )  Wound 05/14/21 #71, left buttocks, pressure stage 2, onset 5/14/21-Dressing/Treatment:  (vashe,opticell, kiran. , mepilex)  Wound 04/09/21 #67, Left Chest Wall Cluster (inframmatory),Abcess, Full Thickness, Onsret 4/7/21-Dressing/Treatment:  (iodoform,kiran. , dry dressing )  Wound 02/19/21 #63, Right Chest Wall (inframammary), Abscess, Full Thickness, Onset 2/16/21-Dressing/Treatment:  (kiran. , silver alginate,dry dressing )   T-spine wound -- Calmoseptine, silver alginate, DrawTex, Mepilex border. No systemic Abx needed. Call if any F/C/D, hyperglycemia, etc - could be a UTI, or I could see that T-spine area flaring up again at some point. Keep right knee dressing in place for the week. No dressing needed on the left great toe any longer. Keep focused on offloading, protein intake, glucose control. Home treatment: good handwashing before and after any dressing changes. Cleanse wound with saline or soap & water before dressing change. May use Vaseline (petrolatum), Aquaphor, Aveeno, CeraVe, Cetaphil, Eucerin, Lubriderm, etc for dry skin. Dressing type for home: as above for the T-spine, right chest and right ischium, TIW and PRN. Written discharge instructions given to patient. Follow up in 1 week.     Electronically signed by Lexa Smith MD on 6/21/2021 at 3:40 PM.

## 2021-06-25 NOTE — PROGRESS NOTES
[address] [facility] ([place], [place]) Physician Orders and Discharge [address]  [address], [address]  [place], [facility]  Telephone: [phone]      Fax: [phone]        Your home care company:   [facility]     Your wound-care supplies will be provided by:  Home care     NAME:  Peggy Franks   YOB: 1965  PRIMARY DIAGNOSIS FOR WOUND CARE CENTER:  Pressure Ulcer Stage 2     Wound cleansing:   Do not scrub or use excessive force. Wash hands with soap and water before and after dressing changes. Prior to applying a clean dressing, cleanse wound with normal saline, wound cleanser, or mild soap and water. Ask your physician or nurse before getting the wound(s) wet in the shower. Wound care for home:     Right Knee:   Vashe, Polysporin, Mepitel One  cast padding x 2  Ace then cotton stockinette  Leave on all week    Right Inner Thigh:  Polysporin and small mepilex border  Leave in place for the week     Right Chest wall Wound:  Dr. Esthela Alvares used Silver Nitrate on your wound today.   The wound will be black or silver in appearance  for the next several days, this is normal.   Calmoseptine to alin wound  Silver Alginate tucked in to the wound  Cover with an absorptive dressing  Change 3 x week     Left Chest Wall Wound:   Triad to wound and alin wound then Pack Gently with Iodoform   Cover with a dry dressing  Change 3 x week      Chronic Open Back Wound:   Spray Hypochlorous Acid into wound let sit, do not rinse  Calmoseptine to alin wound  Silver Alginate for drainage as needed (we are applying today)  Cover with a Mepilex Border   Change 3 x week     Scrotum/Buttocks/Posterior Thigh:  Calmoseptine to alin wound  Spray Hypochlorous Acid into wound let sit, do not rinse  Silver Alginate  Cover with a dry dressing (ABD or Mepilex Border whichever works best)  Change 3 x week        Please note, all wounds (unless stated otherwise here) were mechanically debrided at the time of cleansing here in the wound-care center today, so a small amount of pain, drainage or bleeding from that process might be expected, and is normal.      All products for home use, including multiple products for a single wound if applicable, are medically necessary in order to achieve the best chance at timely wound healing. See provider documentation for details if needed. Substituted dressings applied in the [address] [place] [address] [address] today, if applicable:           New orders for this week (labs, imaging, medications, etc.):           Additional instructions for specific diagnoses:     Continue to use the HIBICLENS (or the generic version) after your bath on the areas that are more troublesome such as your knee, chest and even around the back wound, make sure you let it dry, do not rinse        F/U Appointment is with Dr. Esthela Alvares in 1 week  on                                   at                       .     Your nurse  is Heidi FLOWERS. If we applied slip-resistant hospital socks today, be sure to remove them at least once a day to inspect your toes or feet, even if you're not changing the wraps or dressings underneath. If you see anything concerning (redness, excess moisture, etc), please call and let us know right away.      Should you experience any significant changes in your wound(s) (including redness, increased warmth, increased pain, increased drainage, odor, or fever) or have questions about your wound care, please contact the [facility] at [phone] Monday-Thursday from 8:00 am - 4:30 pm, or Friday from 8:00 am - 2:30 pm.  If you need help with your wound outside these hours and cannot wait until we are again available, contact your home-care company (if applicable), your PCP, or go to the nearest emergency room

## 2021-06-29 NOTE — PROGRESS NOTES
[facility] Progress Note    Kaylin Gunn     : 1965    DATE OF VISIT:  2021    Subjective:     Kaylin Gunn is a 50 y.o. male who has a dehisced surgical ulcer located on the back (midline). Significant symptoms or pertinent wound history since last visit: doing ok overall this week. Some periods of increased drainage and some malodor from his back, but that tends to come and go, chronically. He was out of town for a family event for a little while last weekend, so he got some extra pressure and friction while seated, but didn't do much harm really. No F/C/D, glucoses doing well. Starting to have a bit more of a cough again, not sure if asthma or fluid overload or something else; getting PFTs next week. Additional ulcer(s) noted? yes. BL lower chest wounds from recent abscesses; right buttock/ischial pressure, new scrotal/perineal pressure, and that reopened area on the right knee, small area on the right thigh that opened up from pressure from his stockinette. Left toe remains healed. His current medication list consists of Alirocumab, Fluticasone furoate-vilanterol, Insulin Pen Needle, Insulin Syringe-Needle U-100, One Flow Spirometer, albuterol, albuterol sulfate HFA, apixaban, aspirin, benzonatate, blood glucose test strips, cetirizine, cyclobenzaprine, docusate sodium, ferrous sulfate, insulin glargine, insulin lispro, magnesium oxide, metFORMIN, metoprolol succinate, montelukast, nitroGLYCERIN, nystatin, pantoprazole, polyethylene glycol, promethazine, sacubitril-valsartan, senna, torsemide, traZODone, and vitamin D.     Allergies: Benadryl [diphenhydramine hcl], Statins [statins], Cephalexin, Morphine, Penicillins, and Sulfa antibiotics    Objective:     Vitals:    21 1121   BP: 115/72   Pulse: 78   Resp: 18   Temp: 98 °F (36.7 °C)   TempSrc: Oral   Weight: Comment: no weigh bed available today   Height: 6' 2\" (1.88 m)     AAOx3, overweight, NAD  No cellulitis, angitis, fluctuance (that small spot of fluctuance at the T-spine is less apparent this week)  I think stable mild-mod BL LE lymphedema  Mild folliculitis changes at right chest and flank  Chetna-ulcer skin: generally pink, indurated, some friction changes at pressure ulcers, some chronic dull erythema at T-spine. Ulcer(s): T-spine with stable hardware exposure, mod cloudy drainage, modest hypergranulation; right chest still with some hypergranulation preventing progression to complete healing; left chest more opened up, some inflamed and friable bleeding red tissue, no pus, some superficial necrosis where I incised and cauterized last week; stage 2 ischial-buttock-perineal-scrotal pressure ulcers, clean, red, serous exudate, mildly inflamed; right knee cluster a bit healthier and more granular, with less fragile periwound skin; medial thigh with a small stage 2 pressure ulcer. Photos also saved in electronic chart.     Today's Wound Measurements, per RN documentation:  Wound 02/19/21 #63, Right Chest Wall (inframammary), Abscess, Full Thickness, Onset 2/16/21-Wound Length (cm): 0.9 cm  Wound 05/14/21 #71, left buttocks cluster, pressure stage 2, onset 5/14/21-Wound Length (cm): 3 cm  Wound 04/09/21 #67, Left Chest Wall Cluster (inframmatory),Abcess, Full Thickness, Onsret 4/7/21-Wound Length (cm): 0.5 cm  Wound 02/05/21 #62, Right Buttock cluster, Pressure Injury, Stage 2, Onset 1/15/21-Wound Length (cm): 0.1 cm  Wound 06/25/21 #72, Right Medial Knee, Pressure Injury, Stage 2, Onset 6/22/21-Wound Length (cm): 0.6 cm  Wound 06/25/21 #73, Right Inner Thigh, Pressure Injury, Stage 2, Onset 6/22/21-Wound Length (cm): 0.3 cm  Wound 06/25/21 #74, Right Knee, Pressure Injury, Stage 2, Onset 6/22/21-Wound Length (cm): 0.4 cm  Wound 06/25/21 #75, Posterior Scrotum, Shearing, Full Thickness, Onset 6/25/21-Wound Length (cm): 5 cm    Wound 02/19/21 #63, Right Chest Wall (inframammary), Abscess, Full Thickness, Onset 2/16/21-Wound Width (cm): 3 cm  Wound 05/14/21 #71, left buttocks cluster, pressure stage 2, onset 5/14/21-Wound Width (cm): 2 cm  Wound 04/09/21 #67, Left Chest Wall Cluster (inframmatory),Abcess, Full Thickness, Onsret 4/7/21-Wound Width (cm): 3.5 cm  Wound 02/05/21 #62, Right Buttock cluster, Pressure Injury, Stage 2, Onset 1/15/21-Wound Width (cm): 0.1 cm  Wound 06/25/21 #72, Right Medial Knee, Pressure Injury, Stage 2, Onset 6/22/21-Wound Width (cm): 0.9 cm  Wound 06/25/21 #73, Right Inner Thigh, Pressure Injury, Stage 2, Onset 6/22/21-Wound Width (cm): 1.6 cm  Wound 06/25/21 #74, Right Knee, Pressure Injury, Stage 2, Onset 6/22/21-Wound Width (cm): 0.5 cm  Wound 06/25/21 #75, Posterior Scrotum, Shearing, Full Thickness, Onset 6/25/21-Wound Width (cm): 1.5 cm    Wound 02/19/21 #63, Right Chest Wall (inframammary), Abscess, Full Thickness, Onset 2/16/21-Wound Depth (cm): 0.5 cm  Wound 05/14/21 #71, left buttocks cluster, pressure stage 2, onset 5/14/21-Wound Depth (cm): 0.1 cm  Wound 04/09/21 #67, Left Chest Wall Cluster (inframmatory),Abcess, Full Thickness, Onsret 4/7/21-Wound Depth (cm): 1.2 cm  Wound 02/05/21 #62, Right Buttock cluster, Pressure Injury, Stage 2, Onset 1/15/21-Wound Depth (cm): 0.1 cm  Wound 06/25/21 #72, Right Medial Knee, Pressure Injury, Stage 2, Onset 6/22/21-Wound Depth (cm): 0.1 cm  Wound 06/25/21 #73, Right Inner Thigh, Pressure Injury, Stage 2, Onset 6/22/21-Wound Depth (cm): 0.1 cm  Wound 06/25/21 #74, Right Knee, Pressure Injury, Stage 2, Onset 6/22/21-Wound Depth (cm): 0.1 cm  Wound 06/25/21 #75, Posterior Scrotum, Shearing, Full Thickness, Onset 6/25/21-Wound Depth (cm): 0.1 cm    Assessment:     Patient Active Problem List   Diagnosis Code    Mixed hyperlipidemia E78.2    Coronary artery disease involving native coronary artery of native heart without angina pectoris I25.10    Paraplegia, complete ([facility]) G82.21    Chronic back pain M54.9, G89.29    Arthritis M19.90    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    Acute on chronic systolic congestive heart failure ([facility]) I50.23    LIBORIO on CPAP G47.33, Z99.89    Pressure ulcer of ischium, stage 2, right ([facility]) L89.312    Hyperglycemia due to diabetes mellitus ([facility]) E11.65    Type 2 diabetes mellitus with diabetic peripheral angiopathy without gangrene, with long-term current use of insulin ([facility]) E11.51, Z79.4    Ulcer of chest wall with fat layer exposed, following recent abscess L98.492    Moderate persistent asthma without complication Y80.12    Abscess of chest wall (left side) L02.213       Assessment of today's active condition(s): DM, paraplegia, lymphedema, multiple nonhealing ulcers; no active infection this week, no signs of ischemia, but it's an ongoing fischer to keep many areas simultaneously offloaded, and also to keep his edema under control, without causing new pressure ulcers from compression materials on the RLE. Factors contributing to occurrence and/or persistence of the chronic ulcer include lymphedema, diabetes, chronic pressure, decreased mobility, shear force and obesity. Medical necessity of today's visit is shown by the above documentation. Sharp debridement is not indicated today, based upon the exam findings in the wound(s) above. Procedure note:     Consent obtained. Time out performed per [facility] policy.     Anesthetic  Anesthetic: 4% Lidocaine Cream     To encourage better epithelial cell coverage, I did use AgNO3 to chemically cauterize hypergranulation tissue on the chest (on the lower right) and back (midline) ulcer(s). This was tolerated well, with no significant pain or skin injury. Discharge plan:     Treatment in the wound care center today, per RN documentation: Wound 02/19/21 #63, Right Chest Wall (inframammary), Abscess, Full Thickness, Onset 2/16/21-Dressing/Treatment: Other (comment) (kiran-alin,silver alginate,mepilex)  Wound 05/14/21 #71, left buttocks cluster, pressure stage 2, onset 5/14/21-Dressing/Treatment: Other (comment) (kiran-alin,vashe,silver alginate,dry dressing)  Wound 04/09/21 #67, Left Chest Wall Cluster (inframmatory),Abcess, Full Thickness, Onsret 4/7/21-Dressing/Treatment: Other (comment) (triad,iodoform,dry dressing)  Wound 02/05/21 #62, Right Buttock cluster, Pressure Injury, Stage 2, Onset 1/15/21-Dressing/Treatment: Other (comment) (kiran-alin,vashe,silver alginate,dry dressing)  Wound 06/25/21 #72, Right Medial Knee, Pressure Injury, Stage 2, Onset 6/22/21-Dressing/Treatment: Other (comment) (vashe,PSO,mepitel1,cast pad x2,ace,stockinette)  Wound 06/25/21 #73, Right Inner Thigh, Pressure Injury, Stage 2, Onset 6/22/21-Dressing/Treatment: Other (comment) (PSO,mepilex border)  Wound 06/25/21 #74, Right Knee, Pressure Injury, Stage 2, Onset 6/22/21-Dressing/Treatment: Other (comment) (vashe,PSO,mepitel1,cast pad x2,ace,stockinette)  Wound 06/25/21 #75, Posterior Scrotum, Shearing, Full Thickness, Onset 6/25/21-Dressing/Treatment: Other (comment) (kiran-alin,vashe,silver alginate,dry dressing). Keep right thigh and knee dressings in place for the week. We'll start to get weekly weights here again, as I think he can sometimes start to get fluid overloaded without other obvious clinical signs until he gets quite dyspneic. Keep focused on offloading, position changes, glucose control, protein intake. Nothing in need of Abx or new cultures this week. Home treatment: good handwashing before and after any dressing changes.  Cleanse wound with saline or soap & water before dressing change. May use Vaseline (petrolatum), Aquaphor, Aveeno, CeraVe, Cetaphil, Eucerin, Lubriderm, etc for dry skin. Dressing type for home: basically as above for the T-spine, chest and buttock / perineum, every day or two, as needed. Written discharge instructions given to patient. Follow up in 1 week.     Electronically signed by Noreen Jauregui MD on 6/29/2021 at 9:50 AM.

## 2021-06-29 NOTE — PROCEDURES
[address]                 [address] [address]                               PULMONARY FUNCTION    PATIENT NAME: Fabiola Shaver                   :        1965  MED REC NO:   [id number]                          ROOM:  ACCOUNT NO:   [de-identified]                           ADMIT DATE: 2021  PROVIDER:     Bonnie Paredes MD    DATE OF PROCEDURE:  2021    INDICATION:  Chronic cough. FINDINGS:  1. Spirometry revealed evidence of obstructive defect. FEV1 is 1.16  liters, which is 25% of predicted. No significant response to  bronchodilators. FEV1/FVC ratio of 68%. 2.  Lung volume revealed severe restrictive defect. Total lung capacity  is 3.22 liters, which is 38% of predicted. 3.  Diffusion capacity is 8.65, which is 23% of predicted, which is  severely decreased. 4.  Flow volume loops consistent with combined obstructive or  restrictive defect. CONCLUSION:  1. 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 2.  The patient is paraplegic with a T7 injury.         Jolly Wong MD    D: 2021 11:16:20       T: 2021 15:53:22     [email or web address]  Job#: [id number]     Doc#: [id number]    CC:

## 2021-07-02 NOTE — PLAN OF CARE
[address] [facility] [address]  [address], [address]  [place], [facility]  Telephone: [phone]      Fax: [phone]        Your home care company:   [facility]     Your wound-care supplies will be provided by:  Home care     NAME:  Yamileth Lin   YOB: 1965  PRIMARY DIAGNOSIS FOR WOUND CARE CENTER:  Pressure Ulcer Stage 2     Wound cleansing:   Do not scrub or use excessive force. Wash hands with soap and water before and after dressing changes. Prior to applying a clean dressing, cleanse wound with normal saline, wound cleanser, or mild soap and water. Ask your physician or nurse before getting the wound(s) wet in the shower. Wound care for home:     Right Knee:   Vashe, Polysporin, Mepitel One  cast padding x 2  Ace then cotton stockinette  Leave on all week  Homecare to Change next week 7/9/2021     Right Inner Thigh:  Polysporin and small mepilex border  Leave in place for the week     Right Chest wall Wound:  Dr. Cat Salazar used Silver Nitrate on your wound today.   The wound will be black or silver in appearance  for the next several days, this is normal.   Calmoseptine to alin wound  Silver Alginate tucked in to the wound  Cover with an absorptive dressing  Change 3 x week     Left Chest Wall Wound:   Triad to wound and alin wound then Pack Gently with Iodoform   Cover with a dry dressing  Change 3 x week      Chronic Open Back Wound:   Spray Hypochlorous Acid into wound let sit, do not rinse  Calmoseptine to alin wound  Silver Alginate for drainage as needed (we are applying today)  Cover with a Mepilex Border   Change 3 x week     Scrotum/Buttocks/Posterior Thigh:  Calmoseptine to alin wound  Spray Hypochlorous Acid into wound let sit, do not rinse  Silver Alginate  Cover with a dry dressing (ABD or Mepilex Border whichever works best)  Change 3 x week         Please note, all wounds (unless stated otherwise here) were mechanically debrided at the time of cleansing here in the wound-care center today, so a small amount of pain, drainage or bleeding from that process might be expected, and is normal.      All products for home use, including multiple products for a single wound if applicable, are medically necessary in order to achieve the best chance at timely wound healing. See provider documentation for details if needed. Substituted dressings applied in the [facility] today, if applicable:           New orders for this week (labs, imaging, medications, etc.):           Additional instructions for specific diagnoses:     Continue to use the HIBICLENS (or the generic version) after your bath on the areas that are more troublesome such as your knee, chest and even around the back wound, make sure you let it dry, do not rinse        F/U Appointment is with Dr. Ramon Harrington in 2 weeks  on                                   at                       .     Your nurse  is Heidi FLOWERS. If we applied slip-resistant hospital socks today, be sure to remove them at least once a day to inspect your toes or feet, even if you're not changing the wraps or dressings underneath. If you see anything concerning (redness, excess moisture, etc), please call and let us know right away.      Should you experience any significant changes in your wound(s) (including redness, increased warmth, increased pain, increased drainage, odor, or fever) or have questions about your wound care, please contact the [facility] at [phone] Monday-Thursday from 8:00 am - 4:30 pm, or Friday from 8:00 am - 2:30 pm.  If you need help with your wound outside these hours and cannot wait until we are again available, contact your home-care company (if applicable), your PCP, or go to the nearest emergency

## 2021-07-02 NOTE — PLAN OF CARE
Pt to the [facility] for follow up appointment. Pt to continue with present dressing regimen. Pt to follow up in the [facility] in 2 weeks. Pt to continue to turn and reposition to off load wounds. Discharge instructions reviewed with patient, all questions answered, copy given to patient. Dressings were applied to all wounds per M.D. Instructions at this visit.

## 2021-07-06 PROBLEM — A41.9 SEPTIC SHOCK (HCC): Status: ACTIVE | Noted: 2021-01-01

## 2021-07-06 PROBLEM — R65.21 SEPTIC SHOCK (HCC): Status: ACTIVE | Noted: 2021-01-01

## 2021-07-06 NOTE — PROGRESS NOTES
07/06/21 1911   Vent Information   $Ventilation $Initial Day   Skin Assessment Clean, dry, & intact   Vent Type 980   Vent Mode AC/VC   Vt Ordered 450 mL   Rate Set 18 bmp   Peak Flow 60 L/min   FiO2  100 %   SpO2 98 %   SpO2/FiO2 ratio 98   Sensitivity 3   PEEP/CPAP 10   Humidification Source Heated wire   Humidification Temp 37   Humidification Temp Measured 36.1   Circuit Condensation Drained   Vent Patient Data   Peak Inspiratory Pressure 27 cmH2O   Mean Airway Pressure 15 cmH20   Rate Measured 19 br/min   Vt Exhaled 474 mL   Minute Volume 8.9 Liters   I:E Ratio 1:2.5   Plateau Pressure 25 [id number]   Static Compliance 32 mL/cmH2O   Dynamic Compliance 28 mL/cmH2O   Cough/Sputum   Sputum How Obtained Endotracheal;Suctioned   Cough Non-productive   Sputum Amount None   Sputum Color None   Tenacity None   Spontaneous Breathing Trial (SBT) RT Doc   Pulse 131   Breath Sounds   Right Upper Lobe Diminished   Right Middle Lobe Diminished   Right Lower Lobe Diminished   Left Upper Lobe Diminished   Left Lower Lobe Diminished   Additional Respiratory  Assessments   Resp 20   Position Semi-Mejía's   Alarm Settings   High Pressure Alarm 40 cmH2O   Low Minute Volume Alarm 3 L/min   High Respiratory Rate 45 br/min   Low Exhaled Vt  300 mL   Patient Observation   Observations ETT SIZE 7.5, SECURED AT 27 LIP LINE. AMBU BAG AT HEAD OF BED. WATER GOOD. ETT (adult)   Placement Date/Time: 07/06/21 1205   Preoxygenation: Yes  Mask Ventilation: Difficult mask ventilation (3)  Technique: Rapid sequence  Tube Size: 7.5 mm  Laryngoscope: GlideScope  Location: Oral  Placement Verified By[de-identified] Capnometry; Chest X-ray  Secured. ..    Secured at 27 cm   Measured From Lips   ET Placement Left   Secured By [employer] tube simon   Site Condition Dry        07/06/21 1911   Vent Information   $Ventilation $Initial Day   Skin Assessment Clean, dry, & intact   Vent Type 980   Vent Mode AC/VC   Vt Ordered 450 mL   Rate Set 18 bmp   Peak Flow 60 L/min   FiO2  100 %   SpO2 98 %   SpO2/FiO2 ratio 98   Sensitivity 3   PEEP/CPAP 10   Humidification Source Heated wire   Humidification Temp 37   Humidification Temp Measured 36.1   Circuit Condensation Drained   Vent Patient Data   Peak Inspiratory Pressure 27 cmH2O   Mean Airway Pressure 15 cmH20   Rate Measured 19 br/min   Vt Exhaled 474 mL   Minute Volume 8.9 Liters   I:E Ratio 1:2.5   Plateau Pressure 25 [id number]   Static Compliance 32 mL/cmH2O   Dynamic Compliance 28 mL/cmH2O   Cough/Sputum   Sputum How Obtained Endotracheal;Suctioned   Cough Non-productive   Sputum Amount None   Sputum Color None   Tenacity None   Spontaneous Breathing Trial (SBT) RT Doc   Pulse 131   Breath Sounds   Right Upper Lobe Diminished   Right Middle Lobe Diminished   Right Lower Lobe Diminished   Left Upper Lobe Diminished   Left Lower Lobe Diminished   Additional Respiratory  Assessments   Resp 20   Position Semi-Mejía's   Alarm Settings   High Pressure Alarm 40 cmH2O   Low Minute Volume Alarm 3 L/min   High Respiratory Rate 45 br/min   Low Exhaled Vt  300 mL   Patient Observation   Observations ETT SIZE 7.5, SECURED AT 27 LIP LINE. AMBU BAG AT HEAD OF BED. WATER GOOD. ETT (adult)   Placement Date/Time: 07/06/21 1205   Preoxygenation: Yes  Mask Ventilation: Difficult mask ventilation (3)  Technique: Rapid sequence  Tube Size: 7.5 mm  Laryngoscope: GlideScope  Location: Oral  Placement Verified By[de-identified] Capnometry; Chest X-ray  Secured. ..    Secured at 27 cm   Measured From Lips   ET Placement Left   Secured By [employer] tube simon   Site Condition Dry        07/06/21 1911   Vent Information   $Ventilation $Initial Day   Skin Assessment Clean, dry, & intact   Vent Type 980   Vent Mode AC/VC   Vt Ordered 450 mL   Rate Set 18 bmp   Peak Flow 60 L/min   FiO2  100 %   SpO2 98 %   SpO2/FiO2 ratio 98   Sensitivity 3   PEEP/CPAP 10   Humidification Source Heated wire   Humidification Temp 37   Humidification Temp Measured 36.1   Circuit Condensation Drained   Vent Patient Data   Peak Inspiratory Pressure 27 cmH2O   Mean Airway Pressure 15 cmH20   Rate Measured 19 br/min   Vt Exhaled 474 mL   Minute Volume 8.9 Liters   I:E Ratio 1:2.5   Plateau Pressure 25 [id number]   Static Compliance 32 mL/cmH2O   Dynamic Compliance 28 mL/cmH2O   Cough/Sputum   Sputum How Obtained Endotracheal;Suctioned   Cough Non-productive   Sputum Amount None   Sputum Color None   Tenacity None   Spontaneous Breathing Trial (SBT) RT Doc   Pulse 131   Breath Sounds   Right Upper Lobe Diminished   Right Middle Lobe Diminished   Right Lower Lobe Diminished   Left Upper Lobe Diminished   Left Lower Lobe Diminished   Additional Respiratory  Assessments   Resp 20   Position Semi-Mejía's   Alarm Settings   High Pressure Alarm 40 cmH2O   Low Minute Volume Alarm 3 L/min   High Respiratory Rate 45 br/min   Low Exhaled Vt  300 mL   Patient Observation   Observations ETT SIZE 7.5, SECURED AT 27 LIP LINE. AMBU BAG AT HEAD OF BED. WATER GOOD. ETT (adult)   Placement Date/Time: 07/06/21 1205   Preoxygenation: Yes  Mask Ventilation: Difficult mask ventilation (3)  Technique: Rapid sequence  Tube Size: 7.5 mm  Laryngoscope: GlideScope  Location: Oral  Placement Verified By[de-identified] Capnometry; Chest X-ray  Secured. ..    Secured at 27 cm   Measured From Lips   ET Placement Left   Secured By [employer] tube simon   Site Condition Dry        07/06/21 1911   Vent Information   $Ventilation $Initial Day   Skin Assessment Clean, dry, & intact   Vent Type 980   Vent Mode AC/VC   Vt Ordered 450 mL   Rate Set 18 bmp   Peak Flow 60 L/min   FiO2  100 %   SpO2 98 %   SpO2/FiO2 ratio 98   Sensitivity 3   PEEP/CPAP 10   Humidification Source Heated wire   Humidification Temp 37   Humidification Temp Measured 36.1   Circuit Condensation Drained   Vent Patient Data   Peak Inspiratory Pressure 27 cmH2O   Mean Airway Pressure 15 cmH20   Rate Measured 19 br/min   Vt Exhaled 474 mL   Minute Volume 8.9 Liters   I:E Ratio 1:2.5   Plateau Pressure 25 [id number]   Static Compliance 32 mL/cmH2O   Dynamic Compliance 28 mL/cmH2O   Cough/Sputum   Sputum How Obtained Endotracheal;Suctioned   Cough Non-productive   Sputum Amount None   Sputum Color None   Tenacity None   Spontaneous Breathing Trial (SBT) RT Doc   Pulse 131   Breath Sounds   Right Upper Lobe Diminished   Right Middle Lobe Diminished   Right Lower Lobe Diminished   Left Upper Lobe Diminished   Left Lower Lobe Diminished   Additional Respiratory  Assessments   Resp 20   Position Semi-Mejía's   Alarm Settings   High Pressure Alarm 40 cmH2O   Low Minute Volume Alarm 3 L/min   High Respiratory Rate 45 br/min   Low Exhaled Vt  300 mL   Patient Observation   Observations ETT SIZE 7.5, SECURED AT 27 LIP LINE. AMBU BAG AT HEAD OF BED. WATER GOOD. ETT (adult)   Placement Date/Time: 07/06/21 1205   Preoxygenation: Yes  Mask Ventilation: Difficult mask ventilation (3)  Technique: Rapid sequence  Tube Size: 7.5 mm  Laryngoscope: GlideScope  Location: Oral  Placement Verified By[de-identified] Capnometry; Chest X-ray  Secured. ..    Secured at 27 cm   Measured From Lips   ET Placement Left   Secured By [employer] tube simon   Site Condition Dry        07/06/21 1911   Vent Information   $Ventilation $Initial Day   Skin Assessment Clean, dry, & intact   Vent Type 980   Vent Mode AC/VC   Vt Ordered 450 mL   Rate Set 18 bmp   Peak Flow 60 L/min   FiO2  100 %   SpO2 98 %   SpO2/FiO2 ratio 98   Sensitivity 3   PEEP/CPAP 10   Humidification Source Heated wire   Humidification Temp 37   Humidification Temp Measured 36.1   Circuit Condensation Drained   Vent Patient Data   Peak Inspiratory Pressure 27 cmH2O   Mean Airway Pressure 15 cmH20   Rate Measured 19 br/min   Vt Exhaled 474 mL   Minute Volume 8.9 Liters   I:E Ratio 1:2.5   Plateau Pressure 25 [id number]   Static Compliance 32 mL/cmH2O   Dynamic Compliance 28 mL/cmH2O   Cough/Sputum   Sputum How Obtained Endotracheal;Suctioned   Cough Non-productive Sputum Amount None   Sputum Color None   Tenacity None   Spontaneous Breathing Trial (SBT) RT Doc   Pulse 131   Breath Sounds   Right Upper Lobe Diminished   Right Middle Lobe Diminished   Right Lower Lobe Diminished   Left Upper Lobe Diminished   Left Lower Lobe Diminished   Additional Respiratory  Assessments   Resp 20   Position Semi-Mejía's   Alarm Settings   High Pressure Alarm 40 cmH2O   Low Minute Volume Alarm 3 L/min   High Respiratory Rate 45 br/min   Low Exhaled Vt  300 mL   Patient Observation   Observations ETT SIZE 7.5, SECURED AT 27 LIP LINE. AMBU BAG AT HEAD OF BED. WATER GOOD. ETT (adult)   Placement Date/Time: 07/06/21 1205   Preoxygenation: Yes  Mask Ventilation: Difficult mask ventilation (3)  Technique: Rapid sequence  Tube Size: 7.5 mm  Laryngoscope: GlideScope  Location: Oral  Placement Verified By[de-identified] Capnometry; Chest X-ray  Secured. ..    Secured at 27 cm   Measured From Lips   ET Placement Left   Secured By [employer] tube simon   Site Condition Dry        07/06/21 1911   Vent Information   $Ventilation $Initial Day   Skin Assessment Clean, dry, & intact   Vent Type 980   Vent Mode AC/VC   Vt Ordered 450 mL   Rate Set 18 bmp   Peak Flow 60 L/min   FiO2  100 %   SpO2 98 %   SpO2/FiO2 ratio 98   Sensitivity 3   PEEP/CPAP 10   Humidification Source Heated wire   Humidification Temp 37   Humidification Temp Measured 36.1   Circuit Condensation Drained   Vent Patient Data   Peak Inspiratory Pressure 27 cmH2O   Mean Airway Pressure 15 cmH20   Rate Measured 19 br/min   Vt Exhaled 474 mL   Minute Volume 8.9 Liters   I:E Ratio 1:2.5   Plateau Pressure 25 JIA02   Static Compliance 32 mL/cmH2O   Dynamic Compliance 28 mL/cmH2O   Cough/Sputum   Sputum How Obtained Endotracheal;Suctioned   Cough Non-productive   Sputum Amount None   Sputum Color None   Tenacity None   Spontaneous Breathing Trial (SBT) RT Doc   Pulse 131   Breath Sounds   Right Upper Lobe Diminished   Right Middle Lobe Diminished Right Lower Lobe Diminished   Left Upper Lobe Diminished   Left Lower Lobe Diminished   Additional Respiratory  Assessments   Resp 20   Position Semi-Mejía's   Alarm Settings   High Pressure Alarm 40 cmH2O   Low Minute Volume Alarm 3 L/min   High Respiratory Rate 45 br/min   Low Exhaled Vt  300 mL   Patient Observation   Observations ETT SIZE 7.5, SECURED AT 27 LIP LINE. AMBU BAG AT HEAD OF BED. WATER GOOD. ETT (adult)   Placement Date/Time: 07/06/21 1205   Preoxygenation: Yes  Mask Ventilation: Difficult mask ventilation (3)  Technique: Rapid sequence  Tube Size: 7.5 mm  Laryngoscope: GlideScope  Location: Oral  Placement Verified By[de-identified] Capnometry; Chest X-ray  Secured. ..    Secured at 27 cm   Measured From Lips   ET Placement Left   Secured By Commercial tube simon   Site Condition Dry        07/06/21 1911   Vent Information   $Ventilation $Initial Day   Skin Assessment Clean, dry, & intact   Vent Type 980   Vent Mode AC/VC   Vt Ordered 450 mL   Rate Set 18 bmp   Peak Flow 60 L/min   FiO2  100 %   SpO2 98 %   SpO2/FiO2 ratio 98   Sensitivity 3   PEEP/CPAP 10   Humidification Source Heated wire   Humidification Temp 37   Humidification Temp Measured 36.1   Circuit Condensation Drained   Vent Patient Data   Peak Inspiratory Pressure 27 cmH2O   Mean Airway Pressure 15 cmH20   Rate Measured 19 br/min   Vt Exhaled 474 mL   Minute Volume 8.9 Liters   I:E Ratio 1:2.5   Plateau Pressure 25 IHI02   Static Compliance 32 mL/cmH2O   Dynamic Compliance 28 mL/cmH2O   Cough/Sputum   Sputum How Obtained Endotracheal;Suctioned   Cough Non-productive   Sputum Amount None   Sputum Color None   Tenacity None   Spontaneous Breathing Trial (SBT) RT Doc   Pulse 131   Breath Sounds   Right Upper Lobe Diminished   Right Middle Lobe Diminished   Right Lower Lobe Diminished   Left Upper Lobe Diminished   Left Lower Lobe Diminished   Additional Respiratory  Assessments   Resp 20   Position Semi-Mejía's   Alarm Settings   High Pressure Alarm 40 cmH2O   Low Minute Volume Alarm 3 L/min   High Respiratory Rate 45 br/min   Low Exhaled Vt  300 mL   Patient Observation   Observations ETT SIZE 7.5, SECURED AT 27 LIP LINE. AMBU BAG AT HEAD OF BED. WATER GOOD. ETT (adult)   Placement Date/Time: 07/06/21 1205   Preoxygenation: Yes  Mask Ventilation: Difficult mask ventilation (3)  Technique: Rapid sequence  Tube Size: 7.5 mm  Laryngoscope: GlideScope  Location: Oral  Placement Verified By[de-identified] Capnometry; Chest X-ray  Secured. .. Secured at 27 cm   Measured From Lips   ET Placement Left   Secured By Commercial tube simon   Site Condition Dry        07/06/21 1911   Vent Information   $Ventilation $Initial Day   Skin Assessment Clean, dry, & intact   Vent Type 980   Vent Mode AC/VC   Vt Ordered 450 mL   Rate Set 18 bmp   Peak Flow 60 L/min   FiO2  100 %   SpO2 98 %   SpO2/FiO2 ratio 98   Sensitivity 3   PEEP/CPAP 10   Humidification Source Heated wire   Humidification Temp 37   Humidification Temp Measured 36.1   Circuit Condensation Drained   Vent Patient Data   Peak Inspiratory Pressure 27 cmH2O   Mean Airway Pressure 15 cmH20   Rate Measured 19 br/min   Vt Exhaled 474 mL   Minute Volume 8.9 Liters   I:E Ratio 1:2.5   Plateau Pressure 25 [id number]   Static Compliance 32 mL/cmH2O   Dynamic Compliance 28 mL/cmH2O   Cough/Sputum   Sputum How Obtained Endotracheal;Suctioned   Cough Non-productive   Sputum Amount None   Sputum Color None   Tenacity None   Spontaneous Breathing Trial (SBT) RT Doc   Pulse 131   Breath Sounds   Right Upper Lobe Diminished   Right Middle Lobe Diminished   Right Lower Lobe Diminished   Left Upper Lobe Diminished   Left Lower Lobe Diminished   Additional Respiratory  Assessments   Resp 20   Position Semi-Mejía's   Alarm Settings   High Pressure Alarm 40 cmH2O   Low Minute Volume Alarm 3 L/min   High Respiratory Rate 45 br/min   Low Exhaled Vt  300 mL   Patient Observation   Observations ETT SIZE 7.5, SECURED AT 27 LIP LINE.   AMBU BAG AT HEAD OF BED. WATER GOOD. ETT (adult)   Placement Date/Time: 07/06/21 1205   Preoxygenation: Yes  Mask Ventilation: Difficult mask ventilation (3)  Technique: Rapid sequence  Tube Size: 7.5 mm  Laryngoscope: GlideScope  Location: Oral  Placement Verified By[de-identified] Capnometry; Chest X-ray  Secured. .. Secured at 27 cm   Measured From Lips   ET Placement Left   Secured By Commercial tube simon   Site Condition Dry        07/06/21 1911   Vent Information   $Ventilation $Initial Day   Skin Assessment Clean, dry, & intact   Vent Type 980   Vent Mode AC/VC   Vt Ordered 450 mL   Rate Set 18 bmp   Peak Flow 60 L/min   FiO2  100 %   SpO2 98 %   SpO2/FiO2 ratio 98   Sensitivity 3   PEEP/CPAP 10   Humidification Source Heated wire   Humidification Temp 37   Humidification Temp Measured 36.1   Circuit Condensation Drained   Vent Patient Data   Peak Inspiratory Pressure 27 cmH2O   Mean Airway Pressure 15 cmH20   Rate Measured 19 br/min   Vt Exhaled 474 mL   Minute Volume 8.9 Liters   I:E Ratio 1:2.5   Plateau Pressure 25 [id number]   Static Compliance 32 mL/cmH2O   Dynamic Compliance 28 mL/cmH2O   Cough/Sputum   Sputum How Obtained Endotracheal;Suctioned   Cough Non-productive   Sputum Amount None   Sputum Color None   Tenacity None   Spontaneous Breathing Trial (SBT) RT Doc   Pulse 131   Breath Sounds   Right Upper Lobe Diminished   Right Middle Lobe Diminished   Right Lower Lobe Diminished   Left Upper Lobe Diminished   Left Lower Lobe Diminished   Additional Respiratory  Assessments   Resp 20   Position Semi-Mejía's   Alarm Settings   High Pressure Alarm 40 cmH2O   Low Minute Volume Alarm 3 L/min   High Respiratory Rate 45 br/min   Low Exhaled Vt  300 mL   Patient Observation   Observations ETT SIZE 7.5, SECURED AT 27 LIP LINE. AMBU BAG AT HEAD OF BED. WATER GOOD.     ETT (adult)   Placement Date/Time: 07/06/21 1205   Preoxygenation: Yes  Mask Ventilation: Difficult mask ventilation (3)  Technique: Rapid sequence  Tube Size: 7.5 mm  Laryngoscope: GlideScope  Location: Oral  Placement Verified By[de-identified] Capnometry; Chest X-ray  Secured. .. Secured at 27 cm   Measured From Lips   ET Placement Left   Secured By Commercial tube simon   Site Condition Dry        07/06/21 1911   Vent Information   $Ventilation $Initial Day   Skin Assessment Clean, dry, & intact   Vent Type 980   Vent Mode AC/VC   Vt Ordered 450 mL   Rate Set 18 bmp   Peak Flow 60 L/min   FiO2  100 %   SpO2 98 %   SpO2/FiO2 ratio 98   Sensitivity 3   PEEP/CPAP 10   Humidification Source Heated wire   Humidification Temp 37   Humidification Temp Measured 36.1   Circuit Condensation Drained   Vent Patient Data   Peak Inspiratory Pressure 27 cmH2O   Mean Airway Pressure 15 cmH20   Rate Measured 19 br/min   Vt Exhaled 474 mL   Minute Volume 8.9 Liters   I:E Ratio 1:2.5   Plateau Pressure 25 [id number]   Static Compliance 32 mL/cmH2O   Dynamic Compliance 28 mL/cmH2O   Cough/Sputum   Sputum How Obtained Endotracheal;Suctioned   Cough Non-productive   Sputum Amount None   Sputum Color None   Tenacity None   Spontaneous Breathing Trial (SBT) RT Doc   Pulse 131   Breath Sounds   Right Upper Lobe Diminished   Right Middle Lobe Diminished   Right Lower Lobe Diminished   Left Upper Lobe Diminished   Left Lower Lobe Diminished   Additional Respiratory  Assessments   Resp 20   Position Semi-Mejía's   Alarm Settings   High Pressure Alarm 40 cmH2O   Low Minute Volume Alarm 3 L/min   High Respiratory Rate 45 br/min   Low Exhaled Vt  300 mL   Patient Observation   Observations ETT SIZE 7.5, SECURED AT 27 LIP LINE. AMBU BAG AT HEAD OF BED. WATER GOOD. ETT (adult)   Placement Date/Time: 07/06/21 1205   Preoxygenation: Yes  Mask Ventilation: Difficult mask ventilation (3)  Technique: Rapid sequence  Tube Size: 7.5 mm  Laryngoscope: GlideScope  Location: Oral  Placement Verified By[de-identified] Capnometry; Chest X-ray  Secured. ..    Secured at 27 cm   Measured From Lips   ET Placement Left   Secured By [employer] tube simon   Site Condition Dry        07/06/21 1911   Vent Information   $Ventilation $Initial Day   Skin Assessment Clean, dry, & intact   Vent Type 980   Vent Mode AC/VC   Vt Ordered 450 mL   Rate Set 18 bmp   Peak Flow 60 L/min   FiO2  100 %   SpO2 98 %   SpO2/FiO2 ratio 98   Sensitivity 3   PEEP/CPAP 10   Humidification Source Heated wire   Humidification Temp 37   Humidification Temp Measured 36.1   Circuit Condensation Drained   Vent Patient Data   Peak Inspiratory Pressure 27 cmH2O   Mean Airway Pressure 15 cmH20   Rate Measured 19 br/min   Vt Exhaled 474 mL   Minute Volume 8.9 Liters   I:E Ratio 1:2.5   Plateau Pressure 25 [id number]   Static Compliance 32 mL/cmH2O   Dynamic Compliance 28 mL/cmH2O   Cough/Sputum   Sputum How Obtained Endotracheal;Suctioned   Cough Non-productive   Sputum Amount None   Sputum Color None   Tenacity None   Spontaneous Breathing Trial (SBT) RT Doc   Pulse 131   Breath Sounds   Right Upper Lobe Diminished   Right Middle Lobe Diminished   Right Lower Lobe Diminished   Left Upper Lobe Diminished   Left Lower Lobe Diminished   Additional Respiratory  Assessments   Resp 20   Position Semi-Mejía's   Alarm Settings   High Pressure Alarm 40 cmH2O   Low Minute Volume Alarm 3 L/min   High Respiratory Rate 45 br/min   Low Exhaled Vt  300 mL   Patient Observation   Observations ETT SIZE 7.5, SECURED AT 27 LIP LINE. AMBU BAG AT HEAD OF BED. WATER GOOD. ETT (adult)   Placement Date/Time: 07/06/21 1205   Preoxygenation: Yes  Mask Ventilation: Difficult mask ventilation (3)  Technique: Rapid sequence  Tube Size: 7.5 mm  Laryngoscope: GlideScope  Location: Oral  Placement Verified By[de-identified] Capnometry; Chest X-ray  Secured. ..    Secured at 27 cm   Measured From Lips   ET Placement Left   Secured By [employer] tube simon   Site Condition Dry        07/06/21 1911   Vent Information   $Ventilation $Initial Day   Skin Assessment Clean, dry, & intact   Vent Type 980   Vent Mode AC/VC   Vt Ordered 450 mL Rate Set 18 bmp   Peak Flow 60 L/min   FiO2  100 %   SpO2 98 %   SpO2/FiO2 ratio 98   Sensitivity 3   PEEP/CPAP 10   Humidification Source Heated wire   Humidification Temp 37   Humidification Temp Measured 36.1   Circuit Condensation Drained   Vent Patient Data   Peak Inspiratory Pressure 27 cmH2O   Mean Airway Pressure 15 cmH20   Rate Measured 19 br/min   Vt Exhaled 474 mL   Minute Volume 8.9 Liters   I:E Ratio 1:2.5   Plateau Pressure 25 [id number]   Static Compliance 32 mL/cmH2O   Dynamic Compliance 28 mL/cmH2O   Cough/Sputum   Sputum How Obtained Endotracheal;Suctioned   Cough Non-productive   Sputum Amount None   Sputum Color None   Tenacity None   Spontaneous Breathing Trial (SBT) RT Doc   Pulse 131   Breath Sounds   Right Upper Lobe Diminished   Right Middle Lobe Diminished   Right Lower Lobe Diminished   Left Upper Lobe Diminished   Left Lower Lobe Diminished   Additional Respiratory  Assessments   Resp 20   Position Semi-Mejía's   Alarm Settings   High Pressure Alarm 40 cmH2O   Low Minute Volume Alarm 3 L/min   High Respiratory Rate 45 br/min   Low Exhaled Vt  300 mL   Patient Observation   Observations ETT SIZE 7.5, SECURED AT 27 LIP LINE. AMBU BAG AT HEAD OF BED. WATER GOOD. ETT (adult)   Placement Date/Time: 07/06/21 1205   Preoxygenation: Yes  Mask Ventilation: Difficult mask ventilation (3)  Technique: Rapid sequence  Tube Size: 7.5 mm  Laryngoscope: GlideScope  Location: Oral  Placement Verified By[de-identified] Capnometry; Chest X-ray  Secured. ..    Secured at 27 cm   Measured From Lips   ET Placement Left   Secured By [employer] tube simon   Site Condition Dry        07/06/21 1911   Vent Information   $Ventilation $Initial Day   Skin Assessment Clean, dry, & intact   Vent Type 980   Vent Mode AC/VC   Vt Ordered 450 mL   Rate Set 18 bmp   Peak Flow 60 L/min   FiO2  100 %   SpO2 98 %   SpO2/FiO2 ratio 98   Sensitivity 3   PEEP/CPAP 10   Humidification Source Heated wire   Humidification Temp 37 Humidification Temp Measured 36.1   Circuit Condensation Drained   Vent Patient Data   Peak Inspiratory Pressure 27 cmH2O   Mean Airway Pressure 15 cmH20   Rate Measured 19 br/min   Vt Exhaled 474 mL   Minute Volume 8.9 Liters   I:E Ratio 1:2.5   Plateau Pressure 25 [id number]   Static Compliance 32 mL/cmH2O   Dynamic Compliance 28 mL/cmH2O   Cough/Sputum   Sputum How Obtained Endotracheal;Suctioned   Cough Non-productive   Sputum Amount None   Sputum Color None   Tenacity None   Spontaneous Breathing Trial (SBT) RT Doc   Pulse 131   Breath Sounds   Right Upper Lobe Diminished   Right Middle Lobe Diminished   Right Lower Lobe Diminished   Left Upper Lobe Diminished   Left Lower Lobe Diminished   Additional Respiratory  Assessments   Resp 20   Position Semi-Mejía's   Alarm Settings   High Pressure Alarm 40 cmH2O   Low Minute Volume Alarm 3 L/min   High Respiratory Rate 45 br/min   Low Exhaled Vt  300 mL   Patient Observation   Observations ETT SIZE 7.5, SECURED AT 27 LIP LINE. AMBU BAG AT HEAD OF BED. WATER GOOD. ETT (adult)   Placement Date/Time: 07/06/21 1205   Preoxygenation: Yes  Mask Ventilation: Difficult mask ventilation (3)  Technique: Rapid sequence  Tube Size: 7.5 mm  Laryngoscope: GlideScope  Location: Oral  Placement Verified By[de-identified] Capnometry; Chest X-ray  Secured. ..    Secured at 27 cm   Measured From Lips   ET Placement Left   Secured By [employer] tube simon   Site Condition Dry
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
## 2021-07-06 NOTE — CARE COORDINATION
Case Management Assessment  Initial Evaluation      Patient Name: Yamileth Lin  YOB: 1965  Diagnosis: Septic shock ([facility] [address] [A41.9, R65.21]  Date / Time: 7/6/2021 12:28 AM    Admission status/Date: 07/06/2021 Inpatient   Chart Reviewed: Yes      Patient Interviewed: No- Met with pt at bedside but he did not participate in the conversation    Family Interviewed:  Yes - pt's son Ann Marie Velez and wife Rachell at bedside. Rachell walked in at the end of the assessment. Hospitalization in the last 30 days:  No      Health Care Decision Maker :   Primary Decision Maker: Ale Peraza - Spouse - [phone]    (CM - must 1st enter selection under Navigator - emergency contact- Health Care Decision Maker Relationship and pick relationship)   Who do you trust or have selected to make healthcare decisions for you      Met with: pt's son Ann Marie Velez and wife Janna Jeronimo conducted  (bedside/phone): bedside    Current PCP: Dr. Urvashi Lazo  Medicare and [address] Medicaid  Precert required for SNF : N          3 night stay required - N-WAIVED    ADLS  Support Systems/Care Needs:  family  Transportation: EMS transportation  Pt normally uses [facility] Preparation: Family    Housing  Living Arrangements: Pt lives at home with his son and daughter. He is alone at times per pt's son Ann Marie Velez  Steps: ramp  Intent for return to present living arrangements: Yes per pt's son Ann Marie Velez  Identified Issues: [id number] [facility] with [address] : Yes - pt's family believes that the [facility] recently changed from [facility] but they were unsure of what company. Agency:(Services) Pt's family stated pt has an RN that comes in 2 times a week     Passport/Waiver : Yes -   :      Family unsure                Phone Number:    Passport/Waiver Services: Pt has a HHA that comes in from 9:00am to 3:00pm Monday through Friday.            Durable Medical Equiptment   DME Provider: n/a  Equipment:   Walker___Cane___RTS___ BSC___Shower Chair_x__Hospital Bed_x__W/C__x__Other_____Hoyer Lift___  02 at ____Liter(s)---wears(frequency)_______ [facility] ___ CPAP__x (pt's son stated pt had a Cpap in the past about 4 years ago but unsure if he still has it as pt doesn't wear it)_ BiPap___   N/A____      Home O2 Use :  No    If No for home O2---if presently on O2 during hospitalization:  Yes  if yes CM to follow for potential DC O2 need  Informed of need for care provider to bring portable home O2 tank on day of discharge for nursing to connect prior to leaving:   Not Indicated  Verbalized agreement/Understanding:   Not Indicated    Community Service Affiliation  Dialysis:  No    · Agency:  · Location:  · Dialysis Schedule:  · Phone:   · Fax: Other Community Services: [address] [place] [address] once a week     DISCHARGE PLAN: Explained Case Management role/services. Chart review completed. Met with pt and his family at bedside. Pt did not participate in the conversation. Pt's RN was also present. Pt's son Ann Marie Velez completed the assessment as pt's wife Rachell arrived at the end of the assessment. Pt's son Ann Marie Velez stated that they assist with his ADL's and so does pt's HHA. Pt's son would like to use [employer] transportation when discharged if possible but was made aware that the last admission, pt reported that they don't transport from hospital to home. Pt's son declined needing or wanting a list of transportation companies to review. Pt's wife Rachell unsure of her pt's HHC is now through and is agreeable to writer calling [place] to inquire. Pt's son Ann Marie Velez stated pt has POA and there should be copies on file. They denied needs or questions for CM. Alexa with [facility] who stated pt is active with them for RN through July 9th. Alexa stated at this time, they can accept pt back when discharged as long as pt doesn't have new IV or Wounds that they would need approval for. CM will follow.  Please notify CM if needs or concerns arise. Addendum at 12:23pm: Met with pt's wife London See, son Gale Rodarte and daughter Felix Yen in the ICU lobby per request of ICU staff. They stated that pt has a son in [place] in [facility] and they need the doctor to complete a \"[employer] Notification\" so the son can come in to see pt. Pt's family called the son who stated that this can only be completed on [email or web address] and not able to be faxed, etc. Marleny Gomes will update pt's RN and they stated agreement. Requested ICU tech to have pt's RN call writer as she was not available at this time.  Pt currently on a Vent     Addendum at 1:28pm: pt's RN Valentine updated on the above and stated she will update MD

## 2021-07-06 NOTE — PROGRESS NOTES
Page to Intensive Care MD- on hold for 17 mins. Lab called critical- Pt's Lactic 5.9. Pt moaning, and appears generally uncomfortable- unable at this time to express needs- Per Family at bedside, patients mentation worsening since coming to ED. Levo infusing (see MAR). No maintenance fluid infusing. Awaiting return call.

## 2021-07-06 NOTE — PLAN OF CARE
50 yoM p/w dehydration, constipation, N/V, decreased PO intake. He was found to be hypotensive and tachycardic in ER. He is requiring levophed. Also, noted to be jaundiced and have elevated LFTs. Holding home Eliquis, full dose Lx for now. Septic shock on levophed. Choledocholithiasis on chronic cholecystitis. Elevated troponin, no CP per ER.

## 2021-07-06 NOTE — PROGRESS NOTES
Page to Dr Rinku Mazariegos. Pt received Suppository of Tylenol through Colostomy- no rectal cavity. Per Dr Lorenzo Cadroso- questioning that patient is even absorbing- Gastric sphincter is closed. Per Dr Rinku Mazariegos verify with pharmacy and order IV tylenol.  Call to [facility]
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
## 2021-07-06 NOTE — PROGRESS NOTES
Page to Nephro per Dr Rinku Mazariegos request- Nyoka Sow use of Motrin for Fevers and questioning use of Na Bicarb for pH of 7.1. Awaiting return call. 4:45 PM  Per Nephro- Ok with Bicarb infusion. Ok with Motrin if PO appropriate.   Ok to [employer] CRRT once returns from [employer]

## 2021-07-06 NOTE — ED NOTES
Bed: 13  Expected date:   Expected time:   Means of arrival:   Comments:  [address] [facility] , [facility]  07/06/21 7861

## 2021-07-06 NOTE — PROGRESS NOTES
Page to Dr Mikey Cruz. GI wants patient to travel off of floor to CT to have Drain placed for Biliary Drain. Pt is still on Levo, Vaso, Epi. (See MAR). Pt BP 87/71. Current temp 106.6. Calling for verification if patient is safe to travel. Page placed to Nephro. Attempting to set up CRRT, Machine has gone down. pH 7.1 on ABG    IV tylenol completed. . Verbal Order obtained for IV Sodium Bicarb 150meq in D5 at 76. Push IV 1amp now.

## 2021-07-06 NOTE — PROGRESS NOTES
Dr Albertina Ruiz paged. INformed of elevated Temp 105.5. Micro Called positive BC for [employer]	[place]. GI still at bedside for ERCP.    2:14 PM  OK to order IV tylenol. Keep Vanco. Pharmacy called.

## 2021-07-06 NOTE — FLOWSHEET NOTE
Responded to page to support family during rapid response. Family appreciated  checking on them, but declined  support at this time. Assured them of our continued availability for support if needed. Jesusita Kramer   Associate       07/06/21 1050   Encounter Summary   Services provided to: Family   Referral/Consult From: Multi-disciplinary team   Support System Family members   Continue Visiting   (7/6 initial, rapid, family declined )   Complexity of Encounter High   Length of Encounter 15 minutes   Crisis   Type Rapid response   Assessment Approachable;Tearful   Intervention Active listening   Outcome Expressed gratitude; Refused/declined Patient educated verbally regarding role of OT, LE weight bearing status & pt. provided with education folder including functional exercises, THR education/precautions & caregiver guide pamphlet./oriented to person, place, time and situation

## 2021-07-06 NOTE — PROGRESS NOTES
Reassessment completed (see Flowsheet). All ICU lines remain intact, ICU monitoring continued-   Infusing:  Levo, Vaso, Epi. Bolus (see MAR)  Temp Elevated. pt now on Vent 80% FiO2. Lung sounds Diminished  pt's blood pressures Variable- titration to keep systolic > 90. Dr Rhett Lou at bedside. Continuing to monitor.

## 2021-07-06 NOTE — CONSULTS
Pharmacy to dose vancomycin x 1 dose in ER to treat sepsis and cover gram + organisms including MRSA. HT= 74 in  Wt= 114.3 kg  Dosing weight= 95 kg  BUN/SCr= 66/1.8  CrCl using IBW = 55.2 mL/min    Give Vancomycin 2000 mg IV x 1 dose.   Hung Garcia, [address] [facility]

## 2021-07-06 NOTE — PROCEDURES
Central Venous Catheter Insertion Procedure Note     Procedure: Insertion of Central Venous Catheter     Indications: Need for hemodialysis vascular access     Anesthesia: Local skin infiltration with lidocaine     Procedure Details     Under sterile conditions the skin above the left IJ vein was prepped and covered with a sterile drape. Local anesthesia was applied to the skin and subcutaneous tissues. Using direct ultrasound guidance, the vein was visualized. An 18-gauge needle was then inserted into the vein using direct ultrasound guidance. A guide wire was then passed easily through the needle. The needle was removed and the tissue of the tract was dilated using dilators from the kit after a small knick was made at the skin entry site. Afterwards, a 24 cm temporary HD catheter was inserted into the vein. The catheter had good flow when tested using a 10 ml syringe. The catheter was sutured into place. Findings: There were no changes to vital signs. Patient tolerated procedure well. Recommendations:   CXR ordered to verify placement and looks to be in good position with no complications.   Ok to use immediately
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
## 2021-07-06 NOTE — PROGRESS NOTES
Discussed with hospitalist, GI, IR and updated family multiple times. ERCP was unsuccessful. GI recommending percutaneous gallbladder drain by IR. IR evaluated patient at the bedside by ultrasound, procedure cannot be done at bedside and recommending patient to be transported for CT-guided drain. Patient is extremely high risk for transportation given degree of shock and hypoxemia. Family clearly understand the high risk cardiopulmonary arrest and death with transportation, family would like to move forward with procedure. Patient remains full code. Patient completed [facility] infusion.

## 2021-07-06 NOTE — BRIEF OP NOTE
Brief Postoperative Note      Patient: Elida Byrd  YOB: 1965  MRN: [id number]    Date of Procedure: 7/6/2021    Pre-Op Diagnosis: cholangitis    Post-Op Diagnosis: incomplete ERCP due to failed cannulation       Procedure(s):  ERCP ENDOSCOPIC RETROGRADE CHOLANGIOPANCREATOGRAPHY    Surgeon(s):  Omar Farias MD    Assistant:  * No surgical staff found *    Anesthesia: None    Estimated Blood Loss (mL): Minimal    Complications: None    Specimens:   * No specimens in log *    Implants:  * No implants in log *      Drains:   NG/OG/NJ/NE Tube Orogastric 16 fr Center mouth (Active)       Colostomy LUQ Transverse (Active)       Colostomy LUQ (Active)   Stomal Appliance 2 piece; Changed 07/06/21 1200   Peristomal Assessment Intact; Clean 07/06/21 1200   Stool Appearance Loose 07/06/21 1200   Stool Color Brown 07/06/21 1200   Stool Amount Medium 07/06/21 1200   Output (mL) 100 ml 07/06/21 1200       Urethral Catheter (Active)       Urethral Catheter Non-latex; Temperature probe 16 fr (Active)   $ Urethral catheter insertion $ Not inserted for procedure 07/06/21 0856   Catheter Indications Need for fluid volume management of the critically ill patient in a critical care setting 07/06/21 0856   Site Assessment No urethral drainage;Edema 07/06/21 0856   Urine Color Yellow 07/06/21 0856   Urine Appearance Cloudy 07/06/21 0856   Output (mL) 5 mL 07/06/21 1208       [REMOVED] NG/OG/NJ/NE Tube Orogastric 16 fr Center mouth (Removed)   Status Clamped 07/06/21 1200   Placement Verified by X-Ray (Initial) 07/06/21 1200   Residual Volume (ml) 0 ml 07/06/21 1200       [REMOVED] External Urinary Catheter (Removed)   Output (mL) 250 mL 07/06/21 0809       Findings: incomplete ERCP due to failed cannulation    Recommendation  1. Continue supportive care  2. Broad spectrum antibiotics  3. Consider IR guided drainage  4.  Discussed with ICU team and IR team      Electronically signed by Mari Wheeler Hollis Phelps MD on 7/6/2021 at 3:53 PM
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
## 2021-07-06 NOTE — PROGRESS NOTES
Received request for percutaneous biliary drain. CT was reviewed demonstrating choledocholithiasis with no evidence of intrahepatic ductal dilatation. Patient took eliquis last night. At this point, percutaneous drainage is not indicated and ERCP is reccommended. Patient would be at extremely high risk for hemorrhage due to the eliquis and the technical difficulty of the case. Discussed case with my partner, who is in agreement. Dr. Yadiel Arias was informed of this at the time of the consultation.     Darylene Carpen, MD  Interventional Radiology

## 2021-07-06 NOTE — PROGRESS NOTES
Admission assessment and questions was completed (see flow sheet). Pt is A/O to self. Per Family at bedside mentation is worsening since coming to ED. Pt complains of  Head pain, and back pain- moaning. Respirations are even, labored, with Diminished sounds. On 1-2L O2 via NC. Scheduled medications to follow- holding PO- Dr Jeanine Ring aware. Infusing:  Levophed, KVO (SEE MAR). Call light within reach. Bed in lowest position. Bed alarm on. Will continue to monitor. Family updated on current POC>     0840 AM  Per Dr Justice Segal: Delgado Catheter placed. ABG obtained. Order for 500mL bolus. 9:50 AM  Verbal order for 250mL Bolus.

## 2021-07-06 NOTE — PROGRESS NOTES
Shift handoff report given to [facility] RN at bedside. Pt is sedated on vent  IV handoff completed. 4 eyes to follow. Call light within reach, bed in lowest position, bed alarm on. End of shift checks completed.     CRRT continued with NOEL HEARN.
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
## 2021-07-06 NOTE — PROGRESS NOTES
[facility] Progress Note    Venus Chavez     : 1965    DATE OF VISIT:  2021    Subjective:     Venus Chavez is a 50 y.o. male who has an infection-associated ulcer located on the chest (on the lower left and on the lower right). Significant symptoms or pertinent wound history since last visit: doing ok overall this week. Was up in his chair for a short period of time, but otherwise doing a good job of staying offloaded in bed, turning side to side, without the HOB elevated as often and as far as it used to be (before his bronchospasm and cough were better controlled than they are now). Glucoses doing well, no unexpected spikes, appetite is good, no F/C/D, no SOB or CP. Additional ulcer(s) noted? yes. T-spine dehiscence; right buttock-ischial-perineal pressure ulcers; right thigh healed; right knee cluster a bit smaller again. Left toe remains healed. His current medication list consists of Alirocumab, Fluticasone furoate-vilanterol, Insulin Pen Needle, Insulin Syringe-Needle U-100, One Flow Spirometer, albuterol, albuterol sulfate HFA, apixaban, aspirin, benzonatate, blood glucose test strips, cetirizine, cyclobenzaprine, docusate sodium, ferrous sulfate, insulin glargine, insulin lispro, magnesium oxide, metFORMIN, metoprolol succinate, montelukast, nitroGLYCERIN, nystatin, pantoprazole, polyethylene glycol, promethazine, sacubitril-valsartan, senna, torsemide, traZODone, and vitamin D.     Allergies: Benadryl [diphenhydramine hcl], Statins [statins], Cephalexin, Morphine, Penicillins, and Sulfa antibiotics    Objective:     Vitals:    21 1030   BP: 108/66   Pulse: 77   Resp: 18   Temp: 97.3 °F (36.3 °C)   TempSrc: Oral   Weight: Comment: ADAM   Height: 6' 2\" (1.88 m)     AAOx3, overweight, NAD  No cellulitis, angitis, but a small area of fluctuance in the region of his T-spine wound again (only about 1 cm in size, dull purple, no purulence expressed through hardware sites when manual pressure applied)  Dopplerable left pedal pulses, left foot cool, slow cap refill (but at his baseline)  Mild-mod LE lymphedema, pretty stable for him  Mild follicular and contact dermatitis at right side of chest  [place]hetna-ulcer skin: mostly indurated and pink to cristina, less hyperkeratotic change at the pressure ulcers this week. Ulcer(s): T-spine with that one small area of recurrent fluctuance, 4 exposed screw heads, a couple with hypergranulation, no purulence this week; right chest residual wound very small, red, hypergranular; left chest stagnant this week, with a bit of superficial necrosis at wound edges, and the wound base red, granular, inflamed, friable, with some old clots, no pus; pressure ulcers smaller, stage 2, fewer changes of ongoing pressure or friction; knee cluster small, partial thickness, usual problems there of some flaking hyperkeratosis, potential for serous or purulent discharge beneath, then recurrence of small open erosions, some crusted exudate from them, etc; similar but less impressive (and more chronic) at left medial knee. Photos also saved in electronic chart.     Today's Wound Measurements, per RN documentation:  Wound 06/25/21 #72, Right Medial Knee, Pressure Injury, Stage 2, Onset 6/22/21-Wound Length (cm): 1.1 cm  Wound 06/25/21 #75, Posterior Scrotum, Shearing, Full Thickness, Onset 6/25/21-Wound Length (cm): 2.3 cm  Wound 02/05/21 #62, Right Buttock cluster, Pressure Injury, Stage 2, Onset 1/15/21-Wound Length (cm): 0.1 cm  Wound 05/14/21 #71, left buttocks cluster, pressure stage 2, onset 5/14/21-Wound Length (cm): 0.6 cm  Wound 06/25/21 #74, Right Knee, Pressure Injury, Stage 2, Onset 6/22/21-Wound Length (cm): 0.1 cm  Wound 06/25/21 #73, Right Inner Thigh, Pressure Injury, Stage 2, Onset 6/22/21-Wound Length (cm): 0 cm  Wound 04/09/21 #67, Left Chest Wall Cluster (inframmatory),Abcess, Full Thickness, Onsret 4/7/21-Wound Length (cm): 0.5 cm  Wound 02/19/21 #63, Right Chest Wall (inframammary), Abscess, Full Thickness, Onset 2/16/21-Wound Length (cm): 0.8 cm    Wound 06/25/21 #72, Right Medial Knee, Pressure Injury, Stage 2, Onset 6/22/21-Wound Width (cm): 0.4 cm  Wound 06/25/21 #75, Posterior Scrotum, Shearing, Full Thickness, Onset 6/25/21-Wound Width (cm): 0.3 cm  Wound 02/05/21 #62, Right Buttock cluster, Pressure Injury, Stage 2, Onset 1/15/21-Wound Width (cm): 0.1 cm  Wound 05/14/21 #71, left buttocks cluster, pressure stage 2, onset 5/14/21-Wound Width (cm): 0.5 cm  Wound 06/25/21 #74, Right Knee, Pressure Injury, Stage 2, Onset 6/22/21-Wound Width (cm): 0.1 cm  Wound 06/25/21 #73, Right Inner Thigh, Pressure Injury, Stage 2, Onset 6/22/21-Wound Width (cm): 0 cm  Wound 04/09/21 #67, Left Chest Wall Cluster (inframmatory),Abcess, Full Thickness, Onsret 4/7/21-Wound Width (cm): 3.5 cm  Wound 02/19/21 #63, Right Chest Wall (inframammary), Abscess, Full Thickness, Onset 2/16/21-Wound Width (cm): 3 cm    Wound 06/25/21 #72, Right Medial Knee, Pressure Injury, Stage 2, Onset 6/22/21-Wound Depth (cm): 0.1 cm  Wound 06/25/21 #75, Posterior Scrotum, Shearing, Full Thickness, Onset 6/25/21-Wound Depth (cm): 0.1 cm  Wound 02/05/21 #62, Right Buttock cluster, Pressure Injury, Stage 2, Onset 1/15/21-Wound Depth (cm): 0.1 cm  Wound 05/14/21 #71, left buttocks cluster, pressure stage 2, onset 5/14/21-Wound Depth (cm): 0.1 cm  Wound 06/25/21 #74, Right Knee, Pressure Injury, Stage 2, Onset 6/22/21-Wound Depth (cm): 0.1 cm  Wound 06/25/21 #73, Right Inner Thigh, Pressure Injury, Stage 2, Onset 6/22/21-Wound Depth (cm): 0 cm  Wound 04/09/21 #67, Left Chest Wall Cluster (inframmatory),Abcess, Full Thickness, Onsret 4/7/21-Wound Depth (cm): 0.9 cm  Wound 02/19/21 #63, Right Chest Wall (inframammary), Abscess, Full Thickness, Onset 2/16/21-Wound Depth (cm): 0.6 cm    Assessment:     Patient Active Problem List   Diagnosis Code    Mixed hyperlipidemia debridement is not indicated today, based upon the exam findings in the wound(s) above. Procedure note:     Consent obtained. Time out performed per [facility] policy. Anesthetic  Anesthetic: 4% Lidocaine Cream     To encourage better epithelial cell coverage, I did use AgNO3 to chemically cauterize hypergranulation tissue on the chest (on the lower right) and back (midline) ulcer(s). This was tolerated well, with no significant pain or skin injury. Discharge plan:     Treatment in the wound care center today, per RN documentation: Wound 06/25/21 #72, Right Medial Knee, Pressure Injury, Stage 2, Onset 6/22/21-Dressing/Treatment:  (vashe,pso,mepitelone,castpadx2,ace,stockinette)  Wound 06/25/21 #75, Posterior Scrotum, Shearing, Full Thickness, Onset 6/25/21-Dressing/Treatment:  (kiran. ,vashe,opticell,dry dressing)  Wound 02/05/21 #62, Right Buttock cluster, Pressure Injury, Stage 2, Onset 1/15/21-Dressing/Treatment:  (kiran,vashe,opticell,ABD)  Wound 05/14/21 #71, left buttocks cluster, pressure stage 2, onset 5/14/21-Dressing/Treatment:  (kiran. , vashe,opticell,ABD,dry dressing)  Wound 06/25/21 #74, Right Knee, Pressure Injury, Stage 2, Onset 6/22/21-Dressing/Treatment:  (PSO, vashe,mepitelone,castpadx2,ace,stockinette)  Wound 06/25/21 #73, Right Inner Thigh, Pressure Injury, Stage 2, Onset 6/22/21-Dressing/Treatment:  (PSO,border)  Wound 04/09/21 #67, Left Chest Wall Cluster (inframmatory),Abcess, Full Thickness, Onsret 4/7/21-Dressing/Treatment:  (teriad,iodoform, dry dressing )  Wound 02/19/21 #63, Right Chest Wall (inframammary), Abscess, Full Thickness, Onset 2/16/21-Dressing/Treatment:  (kiran. , silver alginate , dry dressing ). Keep up good work with diabetic diet / glucose control / protein intake. Continue efforts at offloading; group 2 mattress, frequent position changes, not too much time apart from meals with HOB up; minimal use of chair for now.    Offloading boot still working well for the left foot. No systemic Abx needed at present. Home treatment: good handwashing before and after any dressing changes. Cleanse wound with saline or soap & water before dressing change. May use Vaseline (petrolatum), Aquaphor, Aveeno, CeraVe, Cetaphil, Eucerin, Lubriderm, etc for dry skin. Dressing type for home: basically as above, once next week for the knee ulcers (supplies being sent home with him), and every day or two as needed for the T-spine, chest wounds, buttock and perineal pressure ulcers. Written discharge instructions given to patient. Follow up in 2 weeks, but call sooner with concerns or questions.     Electronically signed by Dev Mayes MD on 7/6/2021 at 3:01 PM.
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
## 2021-07-06 NOTE — PROGRESS NOTES
Patient's chart utilized for [employer] re: getting patient's son home from [employer].  Spoke with Kaycee He call back number [phone] case # [id number]

## 2021-07-06 NOTE — PROGRESS NOTES
Called Dr Tr Garcia to Bedside. Pt HR 120s. BP only picking up sys 64.      10:44 AM   Dr Tr Garcia At bedside and patient Christel Mirna down to 45s. Pt also appeared to be apneic, with SpO2 89%. Pt immediately bagged  And verbal orders as follows:    0.25 of Epi IV push. Atropine given 1g atropine give prior    10:46 AM  Ketamine 125.    10:46 AM  100 of GREYSON    10:47 AM  Intubation Sequence. . Pt is currently in in [address] [place] [address] 150 with Pulse- Doppler to Fem. Positive change color. 24LL. Tube: 7.5     10:47 AM  Verbal for an Epi gtt. Epi 1mg IV push. 500mL bolus infusing. Levo currently at 20mcg per Verbal order by Dr Tr Garcia. 10:49 AM  CXR stat ordered. Call placed. Dr Tr Garcia to update family.

## 2021-07-06 NOTE — PROGRESS NOTES
Pharmacy Note  Vancomycin Consult    Mary Lou Hinkle is a 50 y.o. male started on Vancomycin for Sepsis; consult received from Dr. Timoteo Velasquez to manage therapy. Also receiving the following antibiotics: Merrem.     Patient Active Problem List   Diagnosis    Mixed hyperlipidemia    Coronary artery disease involving native coronary artery of native heart without angina pectoris    Paraplegia, complete ([facility])    Chronic back pain    Arthritis    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 ([facility])    Iron deficiency anemia due to chronic blood loss    Lymphedema of both lower extremities    Neurogenic bladder    Neurogenic bowel    Hypergranulation    Dehiscence of surgical wound of T-spine    Onychomycosis    Dystrophic nail    Ischemic cardiomyopathy    Gitelman syndrome    Acute on chronic systolic congestive heart failure ([facility])    LIBORIO on CPAP    Pressure ulcer of ischium, stage 2, right ([facility])    Hyperglycemia due to diabetes mellitus ([facility])    Type 2 diabetes mellitus with diabetic peripheral angiopathy without gangrene, with long-term current use of insulin ([facility])    Ulcer of chest wall with fat layer exposed, following recent abscess    Moderate persistent asthma without complication    Abscess of chest wall (left side)    Septic shock ([facility])    Acute hypoxemic respiratory failure ([facility])    Acute cholangitis    SUDHA (acute kidney injury) ([facility] [address]    Elevated LFTs    Lactic acidosis     Allergies:  Benadryl [diphenhydramine hcl], Statins [statins], Cephalexin, Morphine, Penicillins, and Sulfa antibiotics     Temp max: 105    Recent Labs     07/06/21  0047   BUN 66*   CREATININE 1.8*   WBC 28.6*       Intake/Output Summary (Last 24 hours) at 7/6/2021 1459  Last data filed at 7/6/2021 1433  Gross per 24 hour   Intake 4202.78 ml   Output 1135 ml   Net 3067.78 ml     Culture Date      Source                       Results  Blood: Ecoli    Ht Readings from Last 1 Encounters:   07/06/21 6' 2\" (1.88 m)        Wt Readings from Last 1 Encounters:   07/06/21 252 lb (114.3 kg)       Body mass index is 32.35 kg/m². Estimated Creatinine Clearance: 64 mL/min (A) (based on SCr of 1.8 mg/dL (H)). Goal AUC: 400-600    Assessment/Plan:  Will initiate Vancomycin with a one time loading dose of 2000 mg x1, Pt now on CRRT, will continue to pulse dose. Thank you for the consult. Will continue to follow.   Dulce Lindsey Pharm D 7/6/20213:00 PM  .

## 2021-07-06 NOTE — PROGRESS NOTES
Care rounds completed with Dr. Rhett Lou and multidisciplinary team. Reviewed labs, meds, VS, assessment, & plan of care for today. Informed of elevated Temp- 103+ verbal to place NGT and give PO Tylenol. Informed of giving Lovenox- will start Heparin Gtt this evening. Dr Rhett Lou at bedside visualized pt's BP declining- verbal for IV Vaso, and to give 250mL boluses up to 4L: monitoring SpO2. Goal to keep systolic >70 and wean Pressors. Family at bedside and updated again on current POC. Continue current IV Abx until cultures back.     See dictated note and new orders for details.
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
## 2021-07-06 NOTE — PROGRESS NOTES
Brief Postoperative Note    Venus Chavez  YOB: 1965  [id number]    Pre-operative Diagnosis: choledocholithiasis    Post-operative Diagnosis: Same    Procedure: CT guided percutaneous cholecystostomy tube    Anesthesia: Local    Surgeons/Assistants: Eleni Chan MD    Estimated Blood Loss: less than 5    Complications: None    Specimens: Was Obtained: 25 cc of bloody/purulent fluid was aspirated    Findings: 8 Fr drain formed within the GB fundus.     Electronically signed by Eleni Chan MD on 7/6/2021 at 6:06 PM
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
## 2021-07-07 NOTE — OP NOTE
[address]                 [address]                                OPERATIVE REPORT    PATIENT NAME: Samir Wood                   :        1965  MED REC NO:   [id number]                          ROOM:       4036  ACCOUNT NO:   [de-identified]                           ADMIT DATE: 2021  PROVIDER:     Rebecca Khanna MD    DATE OF PROCEDURE:  2021    PREOPERATIVE DIAGNOSES:  1. Ascending cholangitis. 2.  Choledocholithiasis. 3.  Cholecystitis. POSTOPERATIVE DIAGNOSES:  1. Incomplete ERCP. 2.  Failed cannulation. OPERATION PERFORMED:  ERCP. SURGEON:  Rebecca Khanna MD    INDICATIONS:  This is 59-year-old male with history of diabetes,  hypertension, hyperlipidemia, coronary artery disease, COPD,  osteomyelitis, CHF and stroke with quadriplegia, presented to the  emergency room with fever, dehydration and nausea. He was hypotensive  and tachycardic upon arrival to the emergency room. He quickly declined  and underwent endotracheal intubation for airway protection. He was  placed on vasopressor and Levophed and epinephrine drips. He was  started on broad-spectrum antibiotics. He was noted to have elevated  LFTs, concerning for obstructive jaundice. He was further worked up  with CT scan, did show choledocholithiasis and chronic cholecystitis  raising concern for cholangitis. ERCP is being attempted for  therapeutic purposes. MEDICATIONS:  None. PROCEDURE DETAILS:  Informed consent was obtained after discussing  risks, benefits, and alternatives with the patient's power of ,  i.e., wife. Full history and physical was performed. The patient was  classified as ASA class IV. Cardiopulmonary status was continuously  monitored. No medications were given as the patient remained  unresponsive.   The patient was in supine position and a standard  therapeutic duodenoscope was inserted in the mouth and advanced under  direct visualization to the second portion of the duodenum. The entire  mucosa of the esophagus, stomach and duodenum were examined carefully. FINDINGS:  ESOPHAGUS:  Examined esophagus appeared normal on limited views. STOMACH:  Limited view of the stomach appeared normal.  Large amount of  fluid was present in the stomach and aggressive lavage was performed. DUODENUM:  Duodenal mucosa appeared edematous. There is a periampullary  diverticulum. Several attempts to cannulate the common bile duct  including ampulla were unsuccessful after several attempts. Procedure  was terminated and the scope was withdrawn. The patient tolerated the  procedure well. SUMMARY:  1. Incomplete ERCP. 2.  Failed CBD cannulation. 3.  Edematous ampulla. 4.  Periampullary diverticulum. RECOMMENDATIONS:  1. Return the patient to ICU care. 2.  Continue broad-spectrum antibiotics. 3.  Continue vasopressor support. 4.  Continue IR guided biliary drainage. 5.  Discuss the case with ICU team as well as IR team.  6.  Family was also updated. EBL: <5mL    Ariana Da Silva MD    D: 07/06/2021 18:59:50       T: 07/06/2021 23:30:22     [email or web address]  Job#: [id number]     Doc#: [id number]    CC:   MD Brandon Vasquez MD

## 2021-07-07 NOTE — PROGRESS NOTES
Vancomycin Day 2    Pulse Dose- Patient on CRRT  Vanco random level this am= 15.4    Give Vancomycin 1000 mg x 1 dose today. Continue to pulse dose. Next level 7/8 with AM labs.   Barbara Benites, [address] [facility]

## 2021-07-07 NOTE — PROGRESS NOTES
Reassessment completed (see Flowsheet). All ICU lines remain intact, ICU monitoring continued-   Infusing:  Levo, Vaso. Propofol. Na Bicarb. Epinephrine off at 1110. pt remains Intubated on Vent Peep now 8, 50% FiO2. Lung sounds diminished  pt's blood pressures WDL with support. Continuing to monitor. Family at bedside.

## 2021-07-07 NOTE — PROGRESS NOTES
Nephrology Progress Note   [email or web address]      This patient is a 50year old male whom we are following for SUDHA, on CKRT. Subjective: The patient was seen and examined on CKRT. On 3 pressors. Oligoanuric. Able to tolerate UF goal of even. Family History: Family at bedside and updated  ROS: Unable to obtain      Vitals:  /78   Pulse 87   Temp 96.5 °F (35.8 °C) (Bladder)   Resp 19   Ht 6' 2\" (1.88 m)   Wt 251 lb (113.9 kg)   SpO2 100%   BMI 32.23 kg/m²   I/O last 3 completed shifts: In: 5571.8 [I.V.:3574.5; IV Piggyback:1997.4]  Out: 3927 [Urine:360; Drains:25; Other:700; Stool:100]  I/O this shift:  In: 4183 [I.V.:1026; IV Piggyback:220]  Out: 1231 [Emesis/NG output:200]    Physical Exam:  Physical Exam  Vitals reviewed. Constitutional:       Interventions: He is sedated and intubated. HENT:      Head: Normocephalic and atraumatic. Eyes:      Conjunctiva/sclera: Conjunctivae normal.   Pulmonary:      Effort: He is intubated. Comments: Equal chest expansion, coarse breath sounds bilateral  Musculoskeletal:      Right lower leg: No edema. Left lower leg: No edema.        Access: LIJ temp HD catheter      Medications:   mupirocin   Nasal BID    insulin lispro  0-18 Units Subcutaneous Q4H    heparin (porcine)  4,000 Units Intravenous Once    meropenem  1,000 mg Intravenous Q8H    aspirin  81 mg Oral Nightly    insulin glargine  25 Units Subcutaneous BID    budesonide-formoterol  2 puff Inhalation BID    montelukast  10 mg Oral Daily    pantoprazole  40 mg Intravenous Daily    sodium chloride flush  5-40 mL Intravenous 2 times per day    mupirocin   Nasal BID    hydrocortisone sodium succinate PF  100 mg Intravenous Q8H         Labs:  Recent Labs     07/06/21  0047 07/07/21  0500   WBC 28.6* 36.4*   HGB 11.7* 12.9*   HCT 37.6* 42.8   MCV 77.4* 80.4    140     Recent Labs     07/06/21  0047 07/06/21  0110 07/06/21  2207 07/07/21  0408   *  --  129* 130*   K 5.0  --  3.9 4.5   CL 89*  --  91* 91*   CO2 24  --  13* 15*   GLUCOSE 202*  --  279* 303*   PHOS  --  2.7 4.0 3.8   MG  --   --  2.10 2.10   BUN 66*  --  48* 38*   CREATININE 1.8*  --  1.9* 1.1   LABGLOM 40*  --  37* >60   GFRAA 48*  --  45* >60           Assessment/Plan:    Acute Kidney Injury:    - Etiology:  ATN / Septic shock  - CKRT started on 7/6/21. Access LIJ temp HD catheter. - Keep UF goal of even as tolerated. - Electrolytes monitoring and replacement per CKRT protocol.     Septic Shock with MOSF.  - Etiology:  Cholangitis   - Clinical:  GI consulted, on pressors and broad spectrum antibiotics. - Incomplete ERCP, failed cannulation on 7/6/21. Metabolic Acidosis. - From lactic acidosis and SUDHA. - Continue CKRT and bicarb drip.     Respiratory Failure:  Intubated on vent. Per pulmonary. Please do not hesitate to contact me at [phone] if with questions. Thank you!     Kali Guzman MD, MD  7/7/2021  [facility]

## 2021-07-07 NOTE — PROGRESS NOTES
Shift handoff complete    VSS at this time; Blood pressure 136/82, pulse 97, temperature 95.5 °F (35.3 °C), temperature source Bladder, resp. rate 18, height 6' 2\" (1.88 m), weight 251 lb (113.9 kg), SpO2 98 %.     Patient care transferred to [facility] RN     Electronically signed by Kathie Blanchard RN on 7/7/2021 at 7:16 AM
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
## 2021-07-07 NOTE — PROGRESS NOTES
Pulmonary & Critical Care Medicine ICU Progress Note    CC: Septic shock    Events of Last 24 hours:   Epinephrine 2.5 mcg/min    Levophed 46 mcg/min   Vasopressin 0.03 unit/minute   Propofol 20 mcg/kg/min   PEEP 10 --> 8  FiO2 60% ---> 50%   Plateau pressure 23  PaO2/FiO2 401  CRRT keeping even   Bircab drip 150 cc/hr       Vascular lines: IV: Left IJ HD catheter  Port A cath     MV:   Vent Mode: AC/VC Rate Set: 18 bmp/Vt Ordered: 450 mL/ /FiO2 : 70 %  Recent Labs     21  1525 21  0405   PHART 7.159* 7.222*   AEI0BTO 34.1* 39.8   PO2ART 164.8* 281.3*       IV:   dextrose      sodium chloride      vasopressin (Septic Shock) infusion 0.03 Units/min (21 0654)    EPINEPHrine infusion 2.5 mcg/min (21 0336)    propofol 20 mcg/kg/min (21 0558)    norepinephrine 50 mcg/min (21 034)    prismaSATE BGK 4/2.5 2,000 mL/hr at 21 0455    prismaSATE BGK 4/2.5 500 mL/hr at 21 0433    prismaSATE BGK 4/2.5 500 mL/hr at 21 0433    sodium bicarbonate infusion 150 mL/hr at 21 0527       Vitals:  Blood pressure 136/82, pulse 97, temperature 95.5 °F (35.3 °C), temperature source Bladder, resp. rate 18, height 6' 2\" (1.88 m), weight 251 lb (113.9 kg), SpO2 98 %. on AC 18/450  Temp  Av.8 °F (38.2 °C)  Min: 95.4 °F (35.2 °C)  Max: 107.1 °F (41.7 °C)    Intake/Output Summary (Last 24 hours) at 2021 0735  Last data filed at 2021 0700  Gross per 24 hour   Intake 5571.83 ml   Output 4763 ml   Net 808.83 ml     PE:  EXAM:  General: ill appearing. Intubated sedated. Eyes: PERRL. No sclera icterus. No conjunctival injection. ENT: No discharge. Pharynx clear. ET tube in place  Neck: Trachea midline. Normal thyroid. Resp: No accessory muscle use. + crackles. No wheezing. No rhonchi. No dullness on percussion. CV: Regular rate. Regular rhythm. No mumur or rub. 1 + LE edema. GI: Non-tender. Non-distended. No masses. No organomegaly.  Normal bowel members and physicians is 32 minutes, excluding procedures.

## 2021-07-07 NOTE — PROGRESS NOTES
07/07/21 1858   Vent Information   $Ventilation $Subsequent Day   Skin Assessment Clean, dry, & intact   Vent Type 980   Vent Mode AC/VC   Vt Ordered 450 mL   Rate Set 18 bmp   Peak Flow 60 L/min   Pressure Support 0 cmH20   FiO2  40 %   SpO2 99 %   SpO2/FiO2 ratio 247.5   Sensitivity 3   PEEP/CPAP 8   I Time/ I Time % 0 s   Humidification Source Heated wire   Humidification Temp 37   Humidification Temp Measured 37   Circuit Condensation Drained   Vent Patient Data   High Peep/I Pressure 0   Peak Inspiratory Pressure 27 cmH2O   Mean Airway Pressure 16 cmH20   Rate Measured 18 br/min   Vt Exhaled 443 mL   Minute Volume 7.98 Liters   I:E Ratio 1:3.10   Plateau Pressure 22 [id number]   Static Compliance 32 mL/cmH2O   Dynamic Compliance 23 mL/cmH2O   Cough/Sputum   Sputum How Obtained Endotracheal;Suctioned   Cough Productive   Sputum Amount Small   Sputum Color Creamy   Tenacity Thick   Spontaneous Breathing Trial (SBT) RT Doc   Pulse 77   Breath Sounds   Right Upper Lobe Diminished   Right Middle Lobe Diminished   Right Lower Lobe Diminished   Left Upper Lobe Diminished   Left Lower Lobe Diminished   Additional Respiratory  Assessments   Resp 18   Position Semi-Mejía's   Alarm Settings   High Pressure Alarm 40 cmH2O   Low Minute Volume Alarm 3 L/min   High Respiratory Rate 45 br/min   Patient Observation   Observations ETT SIZE 7.5, SECURED AT 27 LIP LINE. AMBU BAG AT HEAD OF BED. WATER GOOD. ETT (adult)   Placement Date/Time: 07/06/21 1205   Preoxygenation: Yes  Mask Ventilation: Difficult mask ventilation (3)  Technique: Rapid sequence  Tube Size: 7.5 mm  Laryngoscope: GlideScope  Location: Oral  Placement Verified By[de-identified] Capnometry; Chest X-ray  Secured. ..    Secured at 27 cm   Measured From [address] [place] [address],[address] By Commercial tube simon   Site Condition Dry        07/07/21 1858   Vent Information   $Ventilation $Subsequent Day   Skin Assessment Clean, dry, & intact   Vent Type 980   Vent Mode AC/VC Vt Ordered 450 mL   Rate Set 18 bmp   Peak Flow 60 L/min   Pressure Support 0 cmH20   FiO2  40 %   SpO2 99 %   SpO2/FiO2 ratio 247.5   Sensitivity 3   PEEP/CPAP 8   I Time/ I Time % 0 s   Humidification Source Heated wire   Humidification Temp 37   Humidification Temp Measured 37   Circuit Condensation Drained   Vent Patient Data   High Peep/I Pressure 0   Peak Inspiratory Pressure 27 cmH2O   Mean Airway Pressure 16 cmH20   Rate Measured 18 br/min   Vt Exhaled 443 mL   Minute Volume 7.98 Liters   I:E Ratio 1:3.10   Plateau Pressure 22 [id number]   Static Compliance 32 mL/cmH2O   Dynamic Compliance 23 mL/cmH2O   Cough/Sputum   Sputum How Obtained Endotracheal;Suctioned   Cough Productive   Sputum Amount Small   Sputum Color Creamy   Tenacity Thick   Spontaneous Breathing Trial (SBT) RT Doc   Pulse 77   Breath Sounds   Right Upper Lobe Diminished   Right Middle Lobe Diminished   Right Lower Lobe Diminished   Left Upper Lobe Diminished   Left Lower Lobe Diminished   Additional Respiratory  Assessments   Resp 18   Position Semi-Mejía's   Alarm Settings   High Pressure Alarm 40 cmH2O   Low Minute Volume Alarm 3 L/min   High Respiratory Rate 45 br/min   Patient Observation   Observations ETT SIZE 7.5, SECURED AT 27 LIP LINE. AMBU BAG AT HEAD OF BED. WATER GOOD. ETT (adult)   Placement Date/Time: 07/06/21 1205   Preoxygenation: Yes  Mask Ventilation: Difficult mask ventilation (3)  Technique: Rapid sequence  Tube Size: 7.5 mm  Laryngoscope: GlideScope  Location: Oral  Placement Verified By[de-identified] Capnometry; Chest X-ray  Secured. ..    Secured at 27 cm   Measured From [address] [place] [address],[address] By Commercial tube simon   Site Condition Dry        07/07/21 1858   Vent Information   $Ventilation $Subsequent Day   Skin Assessment Clean, dry, & intact   Vent Type 980   Vent Mode AC/VC   Vt Ordered 450 mL   Rate Set 18 bmp   Peak Flow 60 L/min   Pressure Support 0 cmH20   FiO2  40 %   SpO2 99 %   SpO2/FiO2 ratio 247.5 Sensitivity 3   PEEP/CPAP 8   I Time/ I Time % 0 s   Humidification Source Heated wire   Humidification Temp 37   Humidification Temp Measured 37   Circuit Condensation Drained   Vent Patient Data   High Peep/I Pressure 0   Peak Inspiratory Pressure 27 cmH2O   Mean Airway Pressure 16 cmH20   Rate Measured 18 br/min   Vt Exhaled 443 mL   Minute Volume 7.98 Liters   I:E Ratio 1:3.10   Plateau Pressure 22 [id number]   Static Compliance 32 mL/cmH2O   Dynamic Compliance 23 mL/cmH2O   Cough/Sputum   Sputum How Obtained Endotracheal;Suctioned   Cough Productive   Sputum Amount Small   Sputum Color Creamy   Tenacity Thick   Spontaneous Breathing Trial (SBT) RT Doc   Pulse 77   Breath Sounds   Right Upper Lobe Diminished   Right Middle Lobe Diminished   Right Lower Lobe Diminished   Left Upper Lobe Diminished   Left Lower Lobe Diminished   Additional Respiratory  Assessments   Resp 18   Position Semi-Mejía's   Alarm Settings   High Pressure Alarm 40 cmH2O   Low Minute Volume Alarm 3 L/min   High Respiratory Rate 45 br/min   Patient Observation   Observations ETT SIZE 7.5, SECURED AT 27 LIP LINE. AMBU BAG AT HEAD OF BED. WATER GOOD. ETT (adult)   Placement Date/Time: 07/06/21 1205   Preoxygenation: Yes  Mask Ventilation: Difficult mask ventilation (3)  Technique: Rapid sequence  Tube Size: 7.5 mm  Laryngoscope: GlideScope  Location: Oral  Placement Verified By[de-identified] Capnometry; Chest X-ray  Secured. ..    Secured at 27 cm   Measured From [address] [place] [address],[address] By Commercial tube simon   Site Condition Dry        07/07/21 1858   Vent Information   $Ventilation $Subsequent Day   Skin Assessment Clean, dry, & intact   Vent Type 980   Vent Mode AC/VC   Vt Ordered 450 mL   Rate Set 18 bmp   Peak Flow 60 L/min   Pressure Support 0 cmH20   FiO2  40 %   SpO2 99 %   SpO2/FiO2 ratio 247.5   Sensitivity 3   PEEP/CPAP 8   I Time/ I Time % 0 s   Humidification Source Heated wire   Humidification Temp 37   Humidification Temp Pressure 16 cmH20   Rate Measured 18 br/min   Vt Exhaled 443 mL   Minute Volume 7.98 Liters   I:E Ratio 1:3.10   Plateau Pressure 22 [id number]   Static Compliance 32 mL/cmH2O   Dynamic Compliance 23 mL/cmH2O   Cough/Sputum   Sputum How Obtained Endotracheal;Suctioned   Cough Productive   Sputum Amount Small   Sputum Color Creamy   Tenacity Thick   Spontaneous Breathing Trial (SBT) RT Doc   Pulse 77   Breath Sounds   Right Upper Lobe Diminished   Right Middle Lobe Diminished   Right Lower Lobe Diminished   Left Upper Lobe Diminished   Left Lower Lobe Diminished   Additional Respiratory  Assessments   Resp 18   Position Semi-Mejía's   Alarm Settings   High Pressure Alarm 40 cmH2O   Low Minute Volume Alarm 3 L/min   High Respiratory Rate 45 br/min   Patient Observation   Observations ETT SIZE 7.5, SECURED AT 27 LIP LINE. AMBU BAG AT HEAD OF BED. WATER GOOD. ETT (adult)   Placement Date/Time: 07/06/21 1205   Preoxygenation: Yes  Mask Ventilation: Difficult mask ventilation (3)  Technique: Rapid sequence  Tube Size: 7.5 mm  Laryngoscope: GlideScope  Location: Oral  Placement Verified By[de-identified] Capnometry; Chest X-ray  Secured. ..    Secured at 27 cm   Measured From [address] [place] [address],[address] By Commercial tube simon   Site Condition Dry        07/07/21 1858   Vent Information   $Ventilation $Subsequent Day   Skin Assessment Clean, dry, & intact   Vent Type 980   Vent Mode AC/VC   Vt Ordered 450 mL   Rate Set 18 bmp   Peak Flow 60 L/min   Pressure Support 0 cmH20   FiO2  40 %   SpO2 99 %   SpO2/FiO2 ratio 247.5   Sensitivity 3   PEEP/CPAP 8   I Time/ I Time % 0 s   Humidification Source Heated wire   Humidification Temp 37   Humidification Temp Measured 37   Circuit Condensation Drained   Vent Patient Data   High Peep/I Pressure 0   Peak Inspiratory Pressure 27 cmH2O   Mean Airway Pressure 16 cmH20   Rate Measured 18 br/min   Vt Exhaled 443 mL   Minute Volume 7.98 Liters   I:E Ratio 1:3.10   Plateau Pressure 22 [id number] Static Compliance 32 mL/cmH2O   Dynamic Compliance 23 mL/cmH2O   Cough/Sputum   Sputum How Obtained Endotracheal;Suctioned   Cough Productive   Sputum Amount Small   Sputum Color Creamy   Tenacity Thick   Spontaneous Breathing Trial (SBT) RT Doc   Pulse 77   Breath Sounds   Right Upper Lobe Diminished   Right Middle Lobe Diminished   Right Lower Lobe Diminished   Left Upper Lobe Diminished   Left Lower Lobe Diminished   Additional Respiratory  Assessments   Resp 18   Position Semi-Mejía's   Alarm Settings   High Pressure Alarm 40 cmH2O   Low Minute Volume Alarm 3 L/min   High Respiratory Rate 45 br/min   Patient Observation   Observations ETT SIZE 7.5, SECURED AT 27 LIP LINE. AMBU BAG AT HEAD OF BED. WATER GOOD. ETT (adult)   Placement Date/Time: 07/06/21 1205   Preoxygenation: Yes  Mask Ventilation: Difficult mask ventilation (3)  Technique: Rapid sequence  Tube Size: 7.5 mm  Laryngoscope: GlideScope  Location: Oral  Placement Verified By[de-identified] Capnometry; Chest X-ray  Secured. ..    Secured at 27 cm   Measured From [address] [place] [address],[address] By Commercial tube simon   Site Condition Dry        07/07/21 1858   Vent Information   $Ventilation $Subsequent Day   Skin Assessment Clean, dry, & intact   Vent Type 980   Vent Mode AC/VC   Vt Ordered 450 mL   Rate Set 18 bmp   Peak Flow 60 L/min   Pressure Support 0 cmH20   FiO2  40 %   SpO2 99 %   SpO2/FiO2 ratio 247.5   Sensitivity 3   PEEP/CPAP 8   I Time/ I Time % 0 s   Humidification Source Heated wire   Humidification Temp 37   Humidification Temp Measured 37   Circuit Condensation Drained   Vent Patient Data   High Peep/I Pressure 0   Peak Inspiratory Pressure 27 cmH2O   Mean Airway Pressure 16 cmH20   Rate Measured 18 br/min   Vt Exhaled 443 mL   Minute Volume 7.98 Liters   I:E Ratio 1:3.10   Plateau Pressure 22 GBT15   Static Compliance 32 mL/cmH2O   Dynamic Compliance 23 mL/cmH2O   Cough/Sputum   Sputum How Obtained Endotracheal;Suctioned   Cough Productive   Sputum Amount Small   Sputum Color Creamy   Tenacity Thick   Spontaneous Breathing Trial (SBT) RT Doc   Pulse 77   Breath Sounds   Right Upper Lobe Diminished   Right Middle Lobe Diminished   Right Lower Lobe Diminished   Left Upper Lobe Diminished   Left Lower Lobe Diminished   Additional Respiratory  Assessments   Resp 18   Position Semi-Mejía's   Alarm Settings   High Pressure Alarm 40 cmH2O   Low Minute Volume Alarm 3 L/min   High Respiratory Rate 45 br/min   Patient Observation   Observations ETT SIZE 7.5, SECURED AT 27 LIP LINE. AMBU BAG AT HEAD OF BED. WATER GOOD. ETT (adult)   Placement Date/Time: 07/06/21 1205   Preoxygenation: Yes  Mask Ventilation: Difficult mask ventilation (3)  Technique: Rapid sequence  Tube Size: 7.5 mm  Laryngoscope: GlideScope  Location: Oral  Placement Verified By[de-identified] Capnometry; Chest X-ray  Secured. ..    Secured at 27 cm   Measured From [address] [place] [address],[address] By Commercial tube simon   Site Condition Dry        07/07/21 1858   Vent Information   $Ventilation $Subsequent Day   Skin Assessment Clean, dry, & intact   Vent Type 980   Vent Mode AC/VC   Vt Ordered 450 mL   Rate Set 18 bmp   Peak Flow 60 L/min   Pressure Support 0 cmH20   FiO2  40 %   SpO2 99 %   SpO2/FiO2 ratio 247.5   Sensitivity 3   PEEP/CPAP 8   I Time/ I Time % 0 s   Humidification Source Heated wire   Humidification Temp 37   Humidification Temp Measured 37   Circuit Condensation Drained   Vent Patient Data   High Peep/I Pressure 0   Peak Inspiratory Pressure 27 cmH2O   Mean Airway Pressure 16 cmH20   Rate Measured 18 br/min   Vt Exhaled 443 mL   Minute Volume 7.98 Liters   I:E Ratio 1:3.10   Plateau Pressure 22 [id number]   Static Compliance 32 mL/cmH2O   Dynamic Compliance 23 mL/cmH2O   Cough/Sputum   Sputum How Obtained Endotracheal;Suctioned   Cough Productive   Sputum Amount Small   Sputum Color Creamy   Tenacity Thick   Spontaneous Breathing Trial (SBT) RT Doc   Pulse 77   Breath Sounds   Right Upper Lobe Diminished   Right Middle Lobe Diminished   Right Lower Lobe Diminished   Left Upper Lobe Diminished   Left Lower Lobe Diminished   Additional Respiratory  Assessments   Resp 18   Position Semi-Mejía's   Alarm Settings   High Pressure Alarm 40 cmH2O   Low Minute Volume Alarm 3 L/min   High Respiratory Rate 45 br/min   Patient Observation   Observations ETT SIZE 7.5, SECURED AT 27 LIP LINE. AMBU BAG AT HEAD OF BED. WATER GOOD. ETT (adult)   Placement Date/Time: 07/06/21 1205   Preoxygenation: Yes  Mask Ventilation: Difficult mask ventilation (3)  Technique: Rapid sequence  Tube Size: 7.5 mm  Laryngoscope: GlideScope  Location: Oral  Placement Verified By[de-identified] Capnometry; Chest X-ray  Secured. ..    Secured at 27 cm   Measured From [address] [place] [address],[address] By Commercial tube simno   Site Condition Dry

## 2021-07-07 NOTE — PROGRESS NOTES
choledocholithiasis   Post-operative Diagnosis: Same       IMAGING:  XR ABDOMEN (KUB) (SINGLE AP VIEW) - 7/7/2021  Impression   Mildly dilated central small bowel loops, most compatible with ileus       CT ABSCESS DRAINAGE W CATH PLACEMENT S&I - 7/6/2021  Impression   1. Technically successful CT-guided 8 [place] percutaneous cholecystostomy   drain placement. 2. Partially visualized air-filled dilated loops of small bowel may represent   obstruction or ileus. Attending Supervising [de-identified] Attestation Statement  The patient is a 50 y.o. male. I have performed a history and physical examination of the patient. I discussed the case with my physician assistant Adelaida Zimmerman PA-C    I reviewed the patient's Past Medical History, Past Surgical History, Medications, and Allergies. Physical Exam:  Vitals:    07/07/21 1400 07/07/21 1500 07/07/21 1600 07/07/21 1700   BP: 94/78 113/80 102/74 108/87   Pulse: 80 80 78 79   Resp: 19 18 18 18   Temp: 96.8 °F (36 °C)  96.9 °F (36.1 °C)    TempSrc: Bladder  Core    SpO2: 96% 99% 99% 99%   Weight:       Height:           Physical Examination: General appearance - chronically ill appearing  Mental status - sedated  Eyes - pupils equal and reactive, extraocular eye movements intact  Neck - supple, no significant adenopathy  Chest - clear to auscultation, no wheezes, rales or rhonchi, symmetric air entry  Heart - normal rate, regular rhythm, normal S1, S2, no murmurs, rubs, clicks or gallops  Abdomen - soft, nontender, nondistended, no masses or organomegaly  Extremities - pedal edema 2+          Impression: 50year old male with history of DM, HTN, HLD, CAD, COPD, osteomyelitis, CHF, and stroke with post-traumatic paraplegia admitted with septic shock and choledocholithiasis with ascending cholangitis s/p incomplete ERCP due to failed cannulation then CT guided percutaneous cholecystostomy placement. Hospitalization c/b ileus. Recommendation:  1.  Continue supportive care  2. Monitor LFTs  3. Monitor and document output  4. Monitor and replenish electrolytes  5. NPO, IVF  6. OG decompression  7. Minimize narcotics as able  8. Continue broad spectrum antibiotics  9. General surgery consulted  10. Nephrology following   11. Will follow       Toñito Cody PA-C  3:01 PM 7/7/2021                      45-year-old male with history of diabetes, HTN, HLD, CAD, COPD, osteomyelitis, CHF and stroke with quadriplegia, diverting sigmoid colostomy for decubitus ulcer admitted with sepsis and cholangitis s/p percutaneous cholecystotomy tube placement    Continue supportive care. Broad spectrum antibiotics. Monitor LFTs. Surgery consulted.  Will need ERCP once stabilized    Barbara Vasquez MD          (O) [phone]

## 2021-07-07 NOTE — PROGRESS NOTES
Patient has returned to normothermic range  current temp is 98.5, [facility].7 to maintain.     Electronically signed by Manny Hernandez RN on 7/6/2021 at 10:16 PM

## 2021-07-07 NOTE — CARE COORDINATION
INTERDISCIPLINARY PLAN OF CARE CONFERENCE    Date/Time: 7/7/2021 8:49 AM  Completed by: JENNIFER Daugherty. Case Management      Patient Name:  Clark Villarreal  YOB: 1965  Admitting Diagnosis: Septic shock ([facility] [address] [A41.9, R65.21]     Admit Date/Time:  7/6/2021 12:28 AM    Chart reviewed. Interdisciplinary team contacted or reviewed plan related to patient progress and discharge plans. Disciplines included Case Management, Nursing, and Dietitian. Current Status:Ongoing. ICU. Vent. CRRT. IV ABX. Percutaneous gallbladder drain per note from Dr. Albertina Ruiz on 07/06/2021  PT/OT recommendation for discharge plan of care: TBD    Expected D/C Disposition:  TBD    Discharge Plan Comments: Chart review completed. Pt remains in the ICU and is on a Vent. Spoke with pt's RN Sari Garcia who stated Heavenly RN spoke with the [employer] yesterday as pt's son is in the [employer] -see note from [employer] on 07/06/2021. CM will continue to follow and assist. Please notify CM if needs or concerns arise.      Home O2 in place on admit: No

## 2021-07-07 NOTE — PROGRESS NOTES
Reassessment completed (see Flowsheet). All ICU lines remain intact, ICU monitoring continued-   Infusing:  Levo, Vaso, Propofol, Sodium Bicarb. (see MAR)  pt remains On CRRT. Lung sounds Diminished  pt's blood pressures Variable with Presser support. .    Family remains at bedside. Continuing to monitor.

## 2021-07-07 NOTE — PROGRESS NOTES
Shift handoff report given to Zehra Valverde RN at bedside. Pt is sedated on Vent. IV Handoff to follow. Call light within reach, bed in lowest position, bed alarm on. End of shift checks completed. Pt has been free of falls for duration of shift. Cristal Sesay

## 2021-07-07 NOTE — PROGRESS NOTES
Pharmacy Note  RE: Heparin Drip Low Dose  Initial rate = 10 ml/hr    Initial bolus 4000units   APTT 6 hrs after heparin protocol started. Will continue to closely monitor and adjust as necessary.     Trina Reeder RPh

## 2021-07-07 NOTE — PROGRESS NOTES
07/06/21 2321   Vent Information   Vent Type 980   Vent Mode AC/VC   Vt Ordered 450 mL   Rate Set 18 bmp   Peak Flow 60 L/min   FiO2  90 %   SpO2 98 %   SpO2/FiO2 ratio 108.89   Sensitivity 3   PEEP/CPAP 5   Humidification Source Heated wire   Humidification Temp 37   Humidification Temp Measured 36.8   Circuit Condensation Drained   Vent Patient Data   Peak Inspiratory Pressure 26 cmH2O   Mean Airway Pressure 15 cmH20   Rate Measured 21 br/min   Vt Exhaled 472 mL   Minute Volume 9.96 Liters   I:E Ratio 1:2.2   Cough/Sputum   Sputum How Obtained Endotracheal;Suctioned   Cough Non-productive   Sputum Amount None   Sputum Color None   Tenacity None   Spontaneous Breathing Trial (SBT) RT Doc   Pulse 114   Breath Sounds   Right Upper Lobe Diminished   Right Middle Lobe Diminished   Right Lower Lobe Diminished   Left Upper Lobe Diminished   Left Lower Lobe Diminished   Additional Respiratory  Assessments   Resp 19   Position Semi-Mejía's   Alarm Settings   High Pressure Alarm 40 cmH2O   Low Minute Volume Alarm 3 L/min   High Respiratory Rate 45 br/min   Low Exhaled Vt  300 mL   Patient Observation   Observations ETT SIZE 7.5, SECURED AT 27 LIP LINE. AMBU BAG AT HEAD OF BED. WATER GOOD. ETT (adult)   Placement Date/Time: 07/06/21 1205   Preoxygenation: Yes  Mask Ventilation: Difficult mask ventilation (3)  Technique: Rapid sequence  Tube Size: 7.5 mm  Laryngoscope: GlideScope  Location: Oral  Placement Verified By[de-identified] Capnometry; Chest X-ray  Secured. ..    Secured at 27 cm   Measured From [address] [place] [address],[address] By Commercial tube simon   Site Condition Dry

## 2021-07-07 NOTE — PROGRESS NOTES
07/07/21 0313   Vent Information   Vent Type 980   Vent Mode AC/VC   Vt Ordered 450 mL   Rate Set 18 bmp   Peak Flow 60 L/min   FiO2  70 %   SpO2 96 %   SpO2/FiO2 ratio 137.14   Sensitivity 3   PEEP/CPAP 10   Humidification Source Heated wire   Humidification Temp 37   Humidification Temp Measured 37   Circuit Condensation Drained   Vent Patient Data   Peak Inspiratory Pressure 25 cmH2O   Mean Airway Pressure 14 cmH20   Rate Measured 19 br/min   Vt Exhaled 485 mL   Minute Volume 8.51 Liters   I:E Ratio 1:3.1   Cough/Sputum   Sputum How Obtained Endotracheal;Suctioned   Cough Non-productive   Sputum Amount None   Sputum Color None   Tenacity None   Spontaneous Breathing Trial (SBT) RT Doc   Pulse 102   Breath Sounds   Right Upper Lobe Diminished   Right Middle Lobe Diminished   Right Lower Lobe Diminished   Left Upper Lobe Diminished   Left Lower Lobe Diminished   Additional Respiratory  Assessments   Resp 19   Position Semi-Mejía's   Alarm Settings   High Pressure Alarm 40 cmH2O   Low Minute Volume Alarm 3 L/min   High Respiratory Rate 45 br/min   Low Exhaled Vt  300 mL   Patient Observation   Observations ETT SIZE 7.5, SECURED AT 27 LIP LINE. AMBU BAG AT HEAD OF BED. WATER GOOD. ETT (adult)   Placement Date/Time: 07/06/21 1205   Preoxygenation: Yes  Mask Ventilation: Difficult mask ventilation (3)  Technique: Rapid sequence  Tube Size: 7.5 mm  Laryngoscope: GlideScope  Location: Oral  Placement Verified By[de-identified] Capnometry; Chest X-ray  Secured. ..    Secured at 27 cm   Measured From Lips   ET Placement Right   Secured By Commercial tube simon   Site Condition Dry

## 2021-07-07 NOTE — PROGRESS NOTES
Shift assessment was completed (see flow sheet). Pt is Sedated. Responds to pain. Pt currently on ventilator- 7.5 ETT, at [facility]. Infusing:  Levo, Vaso, Epi. Propofol, Sodium Bicarb (see MAR). Tips of fingers purple. Multiple preexisting wounds. FiO2 at 70, PEEP 10, SpO2 at 100%, Respirations are even/ equal,  with diminihsed sounds. Oral Gastric tube Clamped- keeping NPO. IV access- R Chest Port, RH, L vas cath with pigtail and WDL. CRRT- keeping patient Even. Arctic Sun warming patient to Normal-thermic temp 98.4 degrees. Delgado in place with STAT lock, Draining Sarah/ hazy urine. Scheduled medications to follow. Call light within reach. Bed in lowest position. Bed alarm on. Bilateral Wrist restraints in place and tied. Family at bedside and updated. Will continue to monitor    Patient is not able to demonstrated the ability to move from a reclining position to an upright position within the recliner. Patient is confused, demented and /or unable to follow instruction.

## 2021-07-07 NOTE — PROGRESS NOTES
Patient shift assessment complete    Vitals at this time; Temp 106.2, 132 pulse, 21 respirations, 73/56 BP, 101 MAP, Sinus Tach on monitor, CPOT 0, -3 RASS. 96% O2 on vent. Patient currently intubated with ETT at 27 LL. Vent settings are 450/18/+10/100%. Tolerating well. CRRT running, tolerating well. Arctic Sun cooling in place, cooling to 98. 6. Tolerating well. Lung sounds diminished but clear in all lobes. Propofol started at 10mcg/kg/min. Bolus of 250 ml of Normal Saline running at 150 hr. Epinephrin running at 20 mcg/min. Levophed running at 60 mcg/min. Vasopressin running at 0.03 units/min. Sodium bicarb running at 75 ml/hr. Patient family at bedside; two sons, daughter, wife and mother. Updated on patient condition. Family satisfied.      Electronically signed by Usama Siddiqui RN on 7/6/2021 at 9:42 PM

## 2021-07-07 NOTE — PROGRESS NOTES
Patient PM labs returned; critical value CO2 13. Ionized calcium also low. Paged Nephro. Spoke with Dr. René Hobbs in Nephrology at [phone]. Ordered 1 amp bicarb 150 mEq. Titrate Bicarb from 75 ml/hr to 150 ml/hr.      Electronically signed by Xavier Liang RN on 7/7/2021 at 12:07 AM

## 2021-07-07 NOTE — PROGRESS NOTES
Patient reassessment complete    VSS at this time; Blood pressure (!) 126/91, pulse 102, temperature 95.6 °F (35.3 °C), temperature source Bladder, resp. rate 18, height 6' 2\" (1.88 m), weight 251 lb (113.9 kg), SpO2 96 %. Patient rewarming at this time via arctic sun, water temp 104    Patient tolerating vent well; settings 450Vt, 18R, 70%, 10 PEEP. Epinephrine decreased to 2.5 mcg/min. Levophed at 50 mcg/min. Propofol at 15 mcg/kg/min. Sodium Bicarb at 150 ml/hr. Vasopressin continuing at 0.03 units/min. Patient does not appear to be in any pain or discomfort at this time. No further changes at this time.      Electronically signed by Jake Marquez RN on 7/7/2021 at 5:07 AM

## 2021-07-07 NOTE — PROGRESS NOTES
General Surgery consult called to office, 7/7/21 @ 0829-[address] [facility] Rowan Valles

## 2021-07-07 NOTE — PROGRESS NOTES
07/07/21 1907   Vent Information   Vt Ordered 450 mL   Rate Set 18 bmp   Peak Flow 60 L/min   Pressure Support 0 cmH20   FiO2  40 %   Sensitivity 3   PEEP/CPAP 5   I Time/ I Time % 0 s   Vent Patient Data   High Peep/I Pressure 0   Peak Inspiratory Pressure 20 cmH2O   Mean Airway Pressure 9.19 cmH20   Rate Measured 18 br/min   Vt Exhaled 457 mL   Minute Volume 8.16 Liters   I:E Ratio 1:3.10   Spontaneous Breathing Trial (SBT) RT Doc   Pulse 79   Additional Respiratory  Assessments   Resp 24   Alarm Settings   High Pressure Alarm 40 cmH2O   Low Minute Volume Alarm 3 L/min   High Respiratory Rate 45 br/min        07/07/21 1907   Vent Information   Vt Ordered 450 mL   Rate Set 18 bmp   Peak Flow 60 L/min   Pressure Support 0 cmH20   FiO2  40 %   Sensitivity 3   PEEP/CPAP 5   I Time/ I Time % 0 s   Vent Patient Data   High Peep/I Pressure 0   Peak Inspiratory Pressure 20 cmH2O   Mean Airway Pressure 9.19 cmH20   Rate Measured 18 br/min   Vt Exhaled 457 mL   Minute Volume 8.16 Liters   I:E Ratio 1:3.10   Spontaneous Breathing Trial (SBT) RT Doc   Pulse 79   Additional Respiratory  Assessments   Resp 24   Alarm Settings   High Pressure Alarm 40 cmH2O   Low Minute Volume Alarm 3 L/min   High Respiratory Rate 45 br/min

## 2021-07-07 NOTE — PROGRESS NOTES
Care rounds completed with Dr. Herman Sender and multidisciplinary team. Reviewed labs, meds, VS, assessment, & plan of care for today. INcrease insulin to High S/S. D/C vanco- Gram neg rods in urine. Add Heparin gtt back. Titrate off Epinephrine first, then work on Levo. Once Levo Down to 10. Turn off Vaso. . See dictated note and new orders for details.      High risk vesicant drug infusing:     Multiple incompatible medications infusing:      CVP Monitoring:      Extremely difficult IV access challenge:      Continued need for central line access:  YES  Addressed with physician:  YEs    RIGHT PATIENT, RIGHT TIME, RIGHT LINE'

## 2021-07-07 NOTE — PLAN OF CARE
Problem: Falls - Risk of:  Goal: Will remain free from falls  Description: Will remain free from falls  Outcome: Ongoing  Goal: Absence of physical injury  Description: Absence of physical injury  Outcome: Ongoing     Problem: Skin Integrity:  Goal: Will show no infection signs and symptoms  Description: Will show no infection signs and symptoms  Outcome: Ongoing  Goal: Absence of new skin breakdown  Description: Absence of new skin breakdown  Outcome: Ongoing     Problem: Nutrition  Goal: Optimal nutrition therapy  7/7/2021 0210 by Tracey Grant RN  Outcome: Ongoing  7/6/2021 1251 by Brynn Good RD, BRUNA  Outcome: Not Met This Shift  Goal: Understanding of nutritional guidelines  7/7/2021 0210 by Tracey Grant RN  Outcome: Ongoing  7/6/2021 1251 by Brynn Good RD, BRUNA  Outcome: Not Met This Shift     Problem: Non-Violent Restraints  Goal: Removal from restraints as soon as assessed to be safe  Outcome: Ongoing  Goal: No harm/injury to patient while restraints in use  Outcome: Ongoing  Goal: Patient's dignity will be maintained  Outcome: Ongoing

## 2021-07-08 NOTE — PROGRESS NOTES
Pt resting quietly in bed with all lines and monitoring devices in place. Vitals and SpO2 stable.   No changes noted in exam. Continue current plan of care Delila Saint RN

## 2021-07-08 NOTE — PROGRESS NOTES
07/08/21 0306   Vent Information   Vent Type 980   Vent Mode AC/VC   Vt Ordered 450 mL   Rate Set 18 bmp   Peak Flow 60 L/min   Pressure Support 0 cmH20   FiO2  40 %   SpO2 96 %   SpO2/FiO2 ratio 240   Sensitivity 3   PEEP/CPAP 5   I Time/ I Time % 0 s   Humidification Source Heated wire   Humidification Temp 37   Humidification Temp Measured 37   Circuit Condensation Drained   Vent Patient Data   High Peep/I Pressure 0   Peak Inspiratory Pressure 23 cmH2O   Mean Airway Pressure 12 cmH20   Rate Measured 18 br/min   Vt Exhaled 441 mL   Minute Volume 8.01 Liters   I:E Ratio 1:3.10   Cough/Sputum   Sputum How Obtained Endotracheal;Suctioned   Cough Non-productive   Sputum Amount None   Sputum Color None   Tenacity None   Spontaneous Breathing Trial (SBT) RT Doc   Pulse 74   Breath Sounds   Right Upper Lobe Diminished   Right Middle Lobe Diminished   Right Lower Lobe Diminished   Left Upper Lobe Diminished   Left Lower Lobe Diminished   Additional Respiratory  Assessments   Resp 22   Position Semi-Mejía's   Alarm Settings   High Pressure Alarm 40 cmH2O   Low Minute Volume Alarm 3 L/min   High Respiratory Rate 45 br/min   Patient Observation   Observations ETT SIZE 7.5, SECURED AT 27 LIP LINE. AMBU BAG AT HEAD OF BED. WATER GOOD. ETT (adult)   Placement Date/Time: 07/06/21 1205   Preoxygenation: Yes  Mask Ventilation: Difficult mask ventilation (3)  Technique: Rapid sequence  Tube Size: 7.5 mm  Laryngoscope: GlideScope  Location: Oral  Placement Verified By[de-identified] Capnometry; Chest X-ray  Secured. ..    Secured at 27 cm   Measured From Lips   ET Placement Right   Secured By Commercial tube simon   Site Condition Dry

## 2021-07-08 NOTE — PROGRESS NOTES
Initial exam completed- See doc flowsheet for assessment findings. Pt resting quietly in bed and denies any complaints of pain or shortness of breath. He is awake and follows commands. All lines and monitoring devices are in place . Vitals and SpO2 stable. CRRT running well and on target to keep fluid balance even. Call light is within easy reach. Plan of care and goals reviewed.  Patient is not able to demonstrate the ability to move from a reclining position to an upright position within the recliner due to Pt on vent, CRRT and bedrest. .Anabella Wright RN

## 2021-07-08 NOTE — PROGRESS NOTES
FiO2 decreased to 35%         07/08/21 0713   Vent Information   Vt Ordered 450 mL   Rate Set 18 bmp   Peak Flow 60 L/min   Pressure Support 0 cmH20   FiO2  35 %   Sensitivity 3   PEEP/CPAP 5   I Time/ I Time % 0 s   Humidification Source Heated wire   Humidification Temp Measured 37   Circuit Condensation Drained   Vent Patient Data   High Peep/I Pressure 0   Peak Inspiratory Pressure 21 cmH2O   Mean Airway Pressure 9.6 cmH20   Rate Measured 18 br/min   Vt Exhaled 469 mL   Minute Volume 8.22 Liters   I:E Ratio 1:3.10   Plateau Pressure 18 KRO84   Static Compliance 32 mL/cmH2O   Dynamic Compliance 29 mL/cmH2O   Total PEEP 5 cmH20   Auto PEEP 0 cmH20   Cough/Sputum   Sputum How Obtained Endotracheal;Suctioned   Cough Non-productive   Spontaneous Breathing Trial (SBT) RT Doc   Pulse 76   Breath Sounds   Right Upper Lobe Diminished   Right Middle Lobe Diminished   Right Lower Lobe Diminished   Left Upper Lobe Diminished   Left Lower Lobe Diminished   Additional Respiratory  Assessments   Resp 18   Position Semi-Mejía's   Oral Care Completed? Yes   Oral Care Mouth suctioned   Subglottic Suction Done? Yes   Cuff Pressure (cm H2O) 27 cm H2O   Alarm Settings   High Pressure Alarm 40 cmH2O   Low Minute Volume Alarm 3 L/min   High Respiratory Rate 45 br/min   ETT (adult)   Placement Date/Time: 07/06/21 1205   Preoxygenation: Yes  Mask Ventilation: Difficult mask ventilation (3)  Technique: Rapid sequence  Tube Size: 7.5 mm  Laryngoscope: GlideScope  Location: Oral  Placement Verified By[de-identified] Capnometry; Chest X-ray  Secured. ..    Secured at 26 cm   Measured From Lips   ET Placement Left   Secured By Commercial tube simon   Site Condition Dry

## 2021-07-08 NOTE — PROGRESS NOTES
Reassessment completed, see flow sheet. CRRT running smoothly, keeping even per orders. Normothermic (97.1 F) via arctic sun. Sedation being titrated down as tolerated by pt to help improve BP and improve outcome with SBT in AM. Propofol has been off since 2300. With decrease in sedation, pt is much more alert, following commands, and nodding appropriately. Will keep sedation off as long as pt can tolerate. BPs much more stable. Epi remains off, vaso continues at 0.03, and levophed is down to 10. No other changes at this time, will continue to monitor.

## 2021-07-08 NOTE — PROGRESS NOTES
Nephrology Progress Note   [facility]. [employer]      This patient is a 50year old male whom we are following for SUDHA, on CKRT. Subjective: The patient was seen and examined on CKRT. Able to wean off epi drip, remains on Levophed and vasopressin. Awake and able to answer questions. Family History: No family at bedside  ROS: No fever or chills      Vitals:  BP 97/69   Pulse 80   Temp 96.8 °F (36 °C) (Bladder)   Resp 18   Ht 6' 2\" (1.88 m)   Wt 281 lb 4.9 oz (127.6 kg)   SpO2 99%   BMI 36.12 kg/m²   I/O last 3 completed shifts: In: 3995 [I.V.:4968; NG/GT:30; IV Piggyback:942]  Out: 1429 [Urine:68; Emesis/NG output:300; Drains:80; Stool:35]  I/O this shift: In: 539 [I.V.:204; IV Piggyback:335]  Out: 413     Physical Exam:  Physical Exam  Vitals reviewed. Constitutional:       Interventions: He is intubated. HENT:      Head: Normocephalic and atraumatic. Cardiovascular:      Rate and Rhythm: Normal rate. Heart sounds: No friction rub. Pulmonary:      Effort: He is intubated. Comments: Equal chest expansion, coarse breath sounds bilateral  Abdominal:      Tenderness: There is no abdominal tenderness. Comments: Percutaneous drain in the RUQ in place   Musculoskeletal:      Left lower leg: Edema present. Neurological:      Mental Status: He is alert.        Access: LIJ temp HD catheter      Medications:   mupirocin   Nasal BID    insulin lispro  0-18 Units Subcutaneous Q4H    meropenem  1,000 mg Intravenous Q8H    aspirin  81 mg Oral Nightly    insulin glargine  25 Units Subcutaneous BID    budesonide-formoterol  2 puff Inhalation BID    montelukast  10 mg Oral Daily    pantoprazole  40 mg Intravenous Daily    sodium chloride flush  5-40 mL Intravenous 2 times per day    hydrocortisone sodium succinate PF  100 mg Intravenous Q8H         Labs:  Recent Labs     07/07/21  0500 07/07/21  1152 07/08/21  0836   WBC 36.4* 36.7* 20.5*   HGB 12.9* 12.4* 11.7*   HCT 42.8 40.7 38.1* MCV 80.4 80.0 78.5*    162 69*     Recent Labs     07/07/21  1410 07/07/21  2155 07/08/21  0516   * 128* 130*   K 4.7 4.6 4.5   CL 94* 89* 92*   CO2 18* 19* 22   GLUCOSE 247* 266* 229*   PHOS 2.1* 2.7 2.1*   MG 2.20 2.30 2.30   BUN 22* 21* 18   CREATININE <0.5* 0.7* 0.6*   LABGLOM >60 >60 >60   GFRAA >60 >60 >60           Assessment/Plan:    Acute Kidney Injury:    - Etiology:  ATN / Septic shock  - CKRT started on 7/6/21. Access LIJ temp HD catheter. - Keep UF goal of even as tolerated. - Electrolytes monitoring and replacement per CKRT protocol.     Septic Shock with MOSF.  - Etiology:  Cholangitis   - Clinical:  GI consulted, on pressors and broad spectrum antibiotics. - Incomplete ERCP, failed cannulation on 7/6/21.  - Leukocytosis improving.     Metabolic Acidosis. - From lactic acidosis and SUDHA. - Improving. Continue CKRT.  - D/C bicarb drip.     Respiratory Failure:  Intubated on vent. Per pulmonary. Please do not hesitate to contact me at [phone] if with questions. Thank you! Ben Donald MD, MD  7/8/2021  [facility]

## 2021-07-08 NOTE — PROGRESS NOTES
Pharmacy - RE:  Low-dose Heparin drip  Current rate = 10 ml/h  (1000 units/h)  aPTT drawn @ 0002 = 66.1 sec. Goal aPTT = 60 - 90 sec. Per protocol, continue same rate and obtain another aPTT 7/8 @ 0800.
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
## 2021-07-08 NOTE — PROGRESS NOTES
Pt placed on SBT 5/5, FiO2 35%           07/08/21 1054   Vent Information   Vt Ordered 0 mL   Rate Set 0 bmp   Peak Flow 0 L/min   Pressure Support 5 cmH20   FiO2  35 %   Sensitivity 2   PEEP/CPAP 5   I Time/ I Time % 0 s   Vent Patient Data   High Peep/I Pressure 0   Peak Inspiratory Pressure 12 cmH2O   Mean Airway Pressure 7.2 cmH20   Rate Measured 18 br/min   Vt Exhaled 111 mL   Minute Volume 5.43 Liters   I:E Ratio 1:2.60   Spontaneous Breathing Trial (SBT) RT Doc   Pulse 75   Additional Respiratory  Assessments   Resp 20   Alarm Settings   High Pressure Alarm 40 cmH2O   Low Minute Volume Alarm 3 L/min   High Respiratory Rate 45 br/min

## 2021-07-08 NOTE — PROGRESS NOTES
Care rounds completed with Dr Jaden Benavidez and multidisciplinary team.  Reviewed labs, meds, VS (temp/HR/RR), I/O's, assessment, & plan of care for today. See progress note & new orders for details.  updates family at bedside. Pt placed on SBT by RT. Jim Valentin RN
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
## 2021-07-08 NOTE — PROGRESS NOTES
07/08/21 1913   Vent Information   $Ventilation $Subsequent Day   Vent Type 980   Vent Mode AC/VC   Vt Ordered 450 mL   Rate Set 18 bmp   Peak Flow 60 L/min   Pressure Support 0 cmH20   FiO2  30 %   SpO2 99 %   SpO2/FiO2 ratio 330   Sensitivity 3   PEEP/CPAP 5   I Time/ I Time % 0 s   Humidification Source Heated wire   Humidification Temp 37   Humidification Temp Measured 36.8   Circuit Condensation Drained   Vent Patient Data   High Peep/I Pressure 0   Peak Inspiratory Pressure 20 cmH2O   Mean Airway Pressure 9.4 cmH20   Rate Measured 19 br/min   Vt Exhaled 396 mL   Minute Volume 8.27 Liters   I:E Ratio 1:1.80   Plateau Pressure 18 AWO11   Static Compliance 30 mL/cmH2O   Dynamic Compliance 26 mL/cmH2O   Cough/Sputum   Sputum How Obtained None   Spontaneous Breathing Trial (SBT) RT Doc   Pulse 80   Breath Sounds   Right Upper Lobe Clear   Right Middle Lobe Diminished   Right Lower Lobe Diminished   Left Upper Lobe Clear   Left Lower Lobe Diminished   Additional Respiratory  Assessments   Resp 30   Position Semi-Mejía's   Oral Care Completed? Yes   Oral Care Mouth suctioned   Subglottic Suction Done? Yes   Cuff Pressure (cm H2O)   (MOV)   Alarm Settings   High Pressure Alarm 40 cmH2O   Low Minute Volume Alarm 3 L/min   Apnea (secs) 20 secs   High Respiratory Rate 45 br/min   Low Exhaled Vt  300 mL   ETT (adult)   Placement Date/Time: 07/06/21 1205   Preoxygenation: Yes  Mask Ventilation: Difficult mask ventilation (3)  Technique: Rapid sequence  Tube Size: 7.5 mm  Laryngoscope: GlideScope  Location: Oral  Placement Verified By[de-identified] Capnometry; Chest X-ray  Secured. ..    Secured at 26 cm   Measured From [address] [place] [address],[address] By Commercial tube simon   Site Condition Dry

## 2021-07-08 NOTE — PROGRESS NOTES
07/07/21 2254   Vent Information   Vent Type 980   Vent Mode AC/VC   Vt Ordered 450 mL   Rate Set 18 bmp   Peak Flow 60 L/min   Pressure Support 0 cmH20   FiO2  40 %   Sensitivity 3   PEEP/CPAP 5   I Time/ I Time % 0 s   Humidification Source Heated wire   Humidification Temp 37   Humidification Temp Measured 37   Circuit Condensation Drained   Vent Patient Data   High Peep/I Pressure 0   Peak Inspiratory Pressure 21 cmH2O   Mean Airway Pressure 10 cmH20   Rate Measured 20 br/min   Vt Exhaled 448 mL   Minute Volume 9.08 Liters   I:E Ratio 1:3.10   Cough/Sputum   Sputum How Obtained Endotracheal;Suctioned   Cough Non-productive   Sputum Amount None   Sputum Color None   Tenacity None   Spontaneous Breathing Trial (SBT) RT Doc   Pulse 79   Breath Sounds   Right Upper Lobe Diminished   Right Middle Lobe Diminished   Right Lower Lobe Diminished   Left Upper Lobe Diminished   Left Lower Lobe Diminished   Additional Respiratory  Assessments   Resp 18   Position Semi-Mejía's   Alarm Settings   High Pressure Alarm 40 cmH2O   Low Minute Volume Alarm 3 L/min   High Respiratory Rate 45 br/min   Patient Observation   Observations ETT SIZE 7.5, SECURED AT 27 LIP LINE. AMBU BAG AT HAD OF BED. WATER GOOD. ETT (adult)   Placement Date/Time: 07/06/21 1205   Preoxygenation: Yes  Mask Ventilation: Difficult mask ventilation (3)  Technique: Rapid sequence  Tube Size: 7.5 mm  Laryngoscope: GlideScope  Location: Oral  Placement Verified By[de-identified] Capnometry; Chest X-ray  Secured. ..    Secured at 27 cm   Measured From Lips   ET Placement Left   Secured By Commercial tube simon   Site Condition Dry

## 2021-07-08 NOTE — PROGRESS NOTES
Dr Hoa San updated on ABG's and resp status which he occ has low tidal volumes.  Order to return pt to A/C and RT notified Yaneth Mary RN

## 2021-07-08 NOTE — PROGRESS NOTES
Pharmacy - Heparin  APTT drawn at 1845 today = 54.6 seconds (goal = 60-90 seconds). Current heparin drip rate = 1190 units/hr. Per protocol, give heparin 2000 units IV bolus x 1 and increase heparin drip to 1380 units/hr. Draw APTT 6 hours after bolus dose given and drip rate increased. Will adjust to goal APTT = 60-90 seconds. Pharmacy will continue to monitor and adjust as necessary.

## 2021-07-08 NOTE — PROGRESS NOTES
Reassessment completed, see flow sheet. Sedation still off and pt is tolerating well. Levo gtt increased to 13 mcg/min, vaso still at 0.03. CRRT and arctic sun operating w/o issue. No other changes at this time, will continue to monitor.

## 2021-07-08 NOTE — PROGRESS NOTES
ABG's drawn per policy and procedure from left radial after multiple unsuccessful attempts by RT. Lenard's test shows adequate collateral circulation.  No bleeding or hematoma at site Pressure to site x 5 minutes and bandage applied  Jim Homero RN

## 2021-07-08 NOTE — PROGRESS NOTES
Shift assessment completed, see flow sheet. Pt is not following commands with a RASS of -4, moving in response to painful stimulation. Intubated and sedated on AC #7.5 ETT, at 26 LL. RR 18 /  / FiO2 40% / PEEP 5. NSR on monitor, HR 81, /77 (89), SpO2 100%. Respirations are easy, even, and unlabored. Bilateral lung sounds diminished. Edema present on all extremities. OG in place at 68 and hooked to CLWS. Delgado in place and patent with STAT lock. Colostomy in place and secured, CDI. Bilateral soft wrist restraints in place for pt safety. Arctic sun in place to maintain normothermia. CRRT running without issue on an M150 filter with lines reversed, blood flow 200, pre and post 500, dialysate 2000, with a goal to keep even. PIV, port, and VC with CVC pig tail in place, WNL with the following infusions:  Heparin at 1000 units/hr  Levophed at 15 mcg/min (turned down from 20 at this time)  Propofol at 10 mcg/kg/min (turned down from 20 at this time)  Sodium bicarb at 150 ml/hr  Vaso at 0.03 units/min    Pt's son Wanda Garcia called unit, updated on pt condition. All questions answered. Call light within reach, bed in lowest position, bed alarm on. Will continue to monitor.

## 2021-07-08 NOTE — PROGRESS NOTES
ICU Progress Note    Admit Date:  7/6/2021    Patient admitted with septic shock secondary to acute cholangitis. Very critical presentation. Spiked temperatures up to 106 °F  He was on 3 pressors, continued on mechanical ventilation, persistent hypoxemia, initiated on CRRT. Patient was taken down to radiology, a CT-guided percutaneous cholecystostomy tube was placed on the evening of 7/6/2021  He was placed on the Arctic sun to bring down his temperatures. Subjective:  7/7  Mr. Wood Bonilla seen. Much better now. Afebrile. Stable on mechanical vent, FiO2 is down to 40%. Patient is starting to respond a little. Continued on CRRT, creatinine is improving  Dark serosanguineous drainage from the percutaneous cholecystostomy drain, LFTs still elevated. Blood pressure is improving;  weaned off of epinephrine, currently on Levophed and vasopressin. Urine cultures and blood cultures growing E. Coli. Persistent leukocytosis      7/8  Patient is doing much better today. Off sedation. stable on mechanical ventilation , awake alert and oriented and responding very well. FiO2 of 35%. Sats stable . still On levophed and vasopressin. continued on CRRT        Objective:   /80   Pulse 82   Temp 97.6 °F (36.4 °C) (Bladder)   Resp 18   Ht 6' 2\" (1.88 m)   Wt 281 lb 4.9 oz (127.6 kg)   SpO2 98%   BMI 36.12 kg/m²       Intake/Output Summary (Last 24 hours) at 7/8/2021 1835  Last data filed at 7/8/2021 1800  Gross per 24 hour   Intake 4347 ml   Output 4241 ml   Net 106 ml         Physical Exam:  en: Intubated,  on mechanical ventilation. off sedation. Responding well  Eyes: PERRL. No sclera icterus. No conjunctival injection. ENT: No discharge. Pharynx clear. Neck: No JVD. No Carotid Bruit. Trachea midline. Resp: No accessory muscle use. Diminished breath sounds . no crackles. No wheezes. No rhonchi. CV: Regular rate. Regular rhythm. No murmur. No rub. No edema.    GI:  Right upper quadrant percutaneous cholecystostomy drain in place with dark serosanguineous drainage . Obese . non-distended. Non tender . No masses. No organomegaly. Normal bowel sounds. No hernia. Skin: Warm and dry. No nodule on exposed extremities. No rash on exposed extremities. M/S: No cyanosis. No joint deformity. No clubbing. Right BKA  Neuro:  Patient is a paraplegic at baseline  Psych:  no anxiety or agitation      Medications:   Scheduled Meds:   hydrocortisone sodium succinate PF  100 mg Intravenous Q12H    insulin glargine  35 Units Subcutaneous BID    sodium phosphate IVPB  12 mmol Intravenous Once    mupirocin   Nasal BID    insulin lispro  0-18 Units Subcutaneous Q4H    meropenem  1,000 mg Intravenous Q8H    aspirin  81 mg Oral Nightly    budesonide-formoterol  2 puff Inhalation BID    pantoprazole  40 mg Intravenous Daily    sodium chloride flush  5-40 mL Intravenous 2 times per day       Continuous Infusions:   heparin (PORCINE) Infusion 1,190 Units/hr (07/08/21 1800)    dextrose      sodium chloride 5 mL/hr at 07/08/21 1800    vasopressin (Septic Shock) infusion 0.03 Units/min (07/08/21 1827)    propofol Stopped (07/07/21 2324)    norepinephrine 11 mcg/min (07/08/21 1800)    prismaSATE BGK 4/2.5 2,000 mL/hr at 07/08/21 1623    prismaSATE BGK 4/2.5 500 mL/hr at 07/08/21 1230    prismaSATE BGK 4/2.5 500 mL/hr at 07/08/21 1230       Data:  CBC:   Recent Labs     07/07/21  0500 07/07/21  1152 07/08/21  0836   WBC 36.4* 36.7* 20.5*   RBC 5.33 5.09 4.85   HGB 12.9* 12.4* 11.7*   HCT 42.8 40.7 38.1*   MCV 80.4 80.0 78.5*   RDW 20.6* 20.7* 20.8*    162 69*     BMP:   Recent Labs     07/07/21  2155 07/08/21  0516 07/08/21  1420   * 130* 133*   K 4.6 4.5 4.3   CL 89* 92* 95*   CO2 19* 22 23   PHOS 2.7 2.1* 1.9*   BUN 21* 18 15   CREATININE 0.7* 0.6* 0.6*     BNP: No results for input(s): BNP in the last 72 hours.   PT/INR:   Recent Labs     07/06/21  0047   PROTIME 27.8*   INR 2.37*     APTT:   Recent Labs     07/07/21  1815 07/08/21  0002 07/08/21  0750   APTT 74.1* 66.1* 53.5*     CARDIAC ENZYMES:   Recent Labs     07/06/21  0047 07/06/21  0724 07/06/21  1242   TROPONINI 0.25* 0.23* 0.25*     FASTING LIPID PANEL:  Lab Results   Component Value Date    CHOL 192 08/30/2019    HDL 30 (L) 08/30/2019    TRIG 305 (H) 08/30/2019     LIVER PROFILE:   Recent Labs     07/07/21  0408 07/07/21  1152 07/08/21  0516   * 948* 2,505*   * 406* 984*   BILIDIR 7.3* 5.6* 6.9*   BILITOT 8.7* 7.9* 8.0*   ALKPHOS 348* 352* 315*        CULTURES  COVID/Influenza: not detected  Blood: E. coli  Urine: E. coli     EKG:  I have reviewed the EKG with the following interpretation: Normal sinus rhythm, Left axis deviation, Inferior infarct , age undetermined. Anterolateral infarct     RADIOLOGY  XR CHEST PORTABLE   Final Result   Low lung volume with no acute finding appreciated in the chest.         XR ABDOMEN (KUB) (SINGLE AP VIEW)   Final Result   Mildly dilated central small bowel loops, most compatible with ileus         CT ABSCESS DRAINAGE W CATH PLACEMENT S&I   Final Result   1. Technically successful CT-guided 8 [place] percutaneous cholecystostomy   drain placement. 2. Partially visualized air-filled dilated loops of small bowel may represent   obstruction or ileus. XR CHEST PORTABLE   Final Result   Satisfactory position, left IJ catheter. Very low lung volume with basilar atelectasis greater on the left. XR CHEST PORTABLE   Final Result   Left perihilar and lower lobe opacification with possible effusion. Satisfactory position of endotracheal tube on the 2nd of 2 submitted images. CT ABDOMEN PELVIS WO CONTRAST Additional Contrast? None   Final Result   1. Choledocholithiasis with mild extrahepatic biliary ductal dilatation. 2. Suspected chronic cholecystitis. Further evaluation could be made with   HIDA scan. 3. Fatty liver.    4. Single locule of anti dependent gas in the urinary bladder. Recommend   correlation with any recent history of instrumentation. CT HEAD WO CONTRAST   Final Result   No acute intracranial abnormality. Mild generalized atrophy and chronic small vessel ischemic white matter   disease. Remote left parietal infarct. XR CHEST PORTABLE   Final Result   No acute process. FL ERCP BILIARY AND PANCREATIC S&I    (Results Pending)   XR CHEST PORTABLE    (Results Pending)         Assessment:  Active Problems:    Septic shock (HCC)    Acute hypoxemic respiratory failure (HCC)    Cholangitis    SUDHA (acute kidney injury) (HCC)    Elevated LFTs    Lactic acidosis    Choledocholithiasis    Bacteremia    Thrombocytopenia (HCC)  Resolved Problems:    * No resolved hospital problems. *      Plan:    Septic shock  - POA with fever, hypotension, tachycardia, lactic acidosis, leukocytosis. - due to choledocholithiasis, with acute cholangitis  - admitted to ICU. Pulmonology and GI consulted. - Patient  critically ill . He had high fevers up to 106 °F, severe hypotension, requiring 3 pressors Levophed, epinephrine and vasopressin . - weaned off epi gtt. Still on Levo and vasopressin. stress dose   Steroids added , on IV hydrocortisone   -Fever curve is improved now. - Leukocytosis improved. WBC 28-> 30 K-> 20.5  -Lactic acidosis 6.6-> 4.9. monitor   - blood, urine cx growing E. coli  -Continue broad-spectrum IV antibiotics Merrem, Vanc D# 3      Choledocholithiasis   Chronic cholecystitis  Acute cholangitis  - GI consult  - receiving Merrem, Vanc  - ERCP attempt was unsuccessful.   - IR  consulted-> s/p CT-guided percutaneous cholecystostomy drain placement on 7/6/21. Patient with serosanguineous biliary drainage. Acute hypoxic respiratory failure  -Intubated and placed on mechanical ventilation  -Seen by pulmonary critical care  -was  requiring high FiO2. Sats have improved today.    Fi O2 35 %     Acute renal

## 2021-07-08 NOTE — PROGRESS NOTES
Pharmacy - RE:  Low-dose Heparin drip  Current rate = 10 ml/h  (1000 units/h)  aPTT drawn @ 0800 = 53.5 sec. Goal aPTT = 60 - 90 sec.   Per protocol, Heparin 2000 unit bolus and increase drip to 1190 units/hr (11.9ml/hr)  Next Ptt at 1900, goal Ptt 60-90 secs  Anjelica Levy D 7/8/202112:21 PM  .

## 2021-07-09 NOTE — PROGRESS NOTES
07/09/21 0006   Vent Information   Vent Type 980   Vent Mode AC/VC   Vt Ordered 450 mL   Rate Set 18 bmp   Peak Flow 60 L/min   Pressure Support 0 cmH20   FiO2  30 %   SpO2 99 %   SpO2/FiO2 ratio 330   Sensitivity 3   PEEP/CPAP 5   I Time/ I Time % 0 s   Humidification Source Heated wire   Humidification Temp 37   Humidification Temp Measured 36   Circuit Condensation Drained   Vent Patient Data   High Peep/I Pressure 0   Peak Inspiratory Pressure 23 cmH2O   Mean Airway Pressure 12 cmH20   Rate Measured 18 br/min   Vt Exhaled 441 mL   Minute Volume 7.96 Liters   I:E Ratio 1:3.10   Plateau Pressure 20 HCN67   Cough/Sputum   Sputum How Obtained Suctioned;Endotracheal   Cough Non-productive   Spontaneous Breathing Trial (SBT) RT Doc   Pulse 74   Breath Sounds   Right Upper Lobe Clear   Right Middle Lobe Diminished   Right Lower Lobe Diminished   Left Upper Lobe Clear   Left Lower Lobe Diminished   Additional Respiratory  Assessments   Resp 16   Position Semi-Mejía's   Oral Care Completed? Yes   Oral Care Mouth suctioned   Subglottic Suction Done? Yes   Alarm Settings   High Pressure Alarm 40 cmH2O   Low Minute Volume Alarm 3 L/min   Apnea (secs) 20 secs   High Respiratory Rate 45 br/min   Low Exhaled Vt  300 mL   ETT (adult)   Placement Date/Time: 07/06/21 1205   Preoxygenation: Yes  Mask Ventilation: Difficult mask ventilation (3)  Technique: Rapid sequence  Tube Size: 7.5 mm  Laryngoscope: GlideScope  Location: Oral  Placement Verified By[de-identified] Capnometry; Chest X-ray  Secured. ..    Secured at 26 cm   Measured From Lips   ET Placement Right   Secured By Commercial tube simon   Site Condition Dry

## 2021-07-09 NOTE — PROGRESS NOTES
Patient reassessment complete, electrolyte replacements infusing as ordered, receiving calcium gluconate and sodium phosphate. Patient denies needs, lungs clear and diminished, tolerating vent, no sedation, no restraints, weaning levophed on 9 mcg/min. Will continue to monitor, call light in reach.

## 2021-07-09 NOTE — PROGRESS NOTES
Pharmacy - RE:  Low-dose Heparin drip  Current rate = 13.8 ml/h  (1380 units/h)  aPTT drawn @ 0222 = 54.7 sec. Goal aPTT = 60 - 90 sec. Per protocol, give a 2000 unit bolus and increase drip to 15.7 ml/h (1570 units/h). Obtain another aPTT 7/9 @ 1200.

## 2021-07-09 NOTE — PROGRESS NOTES
Care rounds completed with Dr Justice Segal and multidisciplinary team.  Reviewed labs, meds, VS (temp/HR/RR), I/O's, assessment, & plan of care for today. See progress note & new orders for details.   updates pts wife and daughter at bedside  [place]

## 2021-07-09 NOTE — PLAN OF CARE
Problem: Falls - Risk of:  Goal: Will remain free from falls  Description: Will remain free from falls  Outcome: Ongoing  Goal: Absence of physical injury  Description: Absence of physical injury  Outcome: Ongoing     Problem: Skin Integrity:  Goal: Will show no infection signs and symptoms  Description: Will show no infection signs and symptoms  Outcome: Ongoing  Goal: Absence of new skin breakdown  Description: Absence of new skin breakdown  Outcome: Ongoing     Problem: Nutrition  Goal: Optimal nutrition therapy  Outcome: Ongoing  Goal: Understanding of nutritional guidelines  Outcome: Ongoing     Problem: Non-Violent Restraints  Goal: Removal from restraints as soon as assessed to be safe  Outcome: Completed  Goal: No harm/injury to patient while restraints in use  Outcome: Completed  Goal: Patient's dignity will be maintained  Outcome: Completed
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
## 2021-07-09 NOTE — PLAN OF CARE
Problem: Nutrition  Intervention: Swallowing evaluation  Note: SLP completed evaluation. Please refer to notes in EMR. Intervention: Aspiration precautions  Note: SLP completed evaluation. Please refer to notes in EMR.        Erinn Lee MA [address] [facility]  Speech Language Pathologist

## 2021-07-09 NOTE — PROGRESS NOTES
Patient re-assessment complete, appears more jaundice through the night than at the beginning of shift, Lung sounds clear and diminished, able to bring up oral secretions, no sedation, levophed, vasopressin, heparin infusing. Patient bathed, dressings changed to heel and back. CRRT in progress. Will continue to monitor. Call light in reach. Not restrained.

## 2021-07-09 NOTE — PROGRESS NOTES
Pt stable post extubation and respirations are easy SpO2 stable. He is able to use yankauer suction to clear productive cough.  Vitals stable Horacio Ruth RN

## 2021-07-09 NOTE — PROGRESS NOTES
Pt eating evening meal and assisted by family. He is swallowing without difficulty taking small bites and chewing thoroughly. Taking honey thick liquid well.  SpO2 stable and no changes noted in exam. O2 remains at 5 lpm Continue current plan of care [facility]
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
## 2021-07-09 NOTE — PROGRESS NOTES
Patient resting in bed, alert and oriented, awake, able to nod yes/no, restraints in place, on CRRT and [place] Sun normothermic, on ventilator 97% with FiO2 of 30%, lung sounds clear, bowel sounds hypoactive, colostomy in place, no new stool, JESUSITA drain bile brown colored drainage. Delgado dark britney to brown output in bag. On heparin drip, bolus ordered, rate to increase. On levophed and vasopressin. Will continue to monitor.
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
## 2021-07-09 NOTE — PROGRESS NOTES
Restraints removed, patient a/o, no sedation, demonstrates understanding not to pull on lines or tubes, patient calm, awake, tolerating vent. Remote/call light in hand, updated on time and situation, expectations. Will continue to monitor, denies needs.

## 2021-07-09 NOTE — PROGRESS NOTES
Son calls to check on patient. Updated, states his mother will be in in the morning and would like to be here when if he is extubated.

## 2021-07-09 NOTE — PLAN OF CARE
Nutrition Problem #1: Inadequate oral intake  Intervention: Food and/or Nutrient Delivery: Continue NPO  Nutritional Goals: patient will adhere to NPO status until he is medically cleared/cleared by SLP for po diet advancement with appropriate po diet orders

## 2021-07-09 NOTE — PROGRESS NOTES
07/09/21 0312   Vent Information   Equipment Changed Humidification   Vent Type 980   Vent Mode AC/VC   Vt Ordered 450 mL   Rate Set 18 bmp   Peak Flow 60 L/min   Pressure Support 0 cmH20   FiO2  30 %   SpO2 98 %   SpO2/FiO2 ratio 326.67   Sensitivity 3   PEEP/CPAP 5   I Time/ I Time % 0 s   Humidification Source Heated wire   Humidification Temp 37   Humidification Temp Measured 37   Circuit Condensation Drained   Vent Patient Data   High Peep/I Pressure 0   Peak Inspiratory Pressure 21 cmH2O   Mean Airway Pressure 9.69 cmH20   Rate Measured 18 br/min   Vt Exhaled 448 mL   Minute Volume 8.01 Liters   I:E Ratio 1:3.10   Plateau Pressure 12 AHR13   Cough/Sputum   Sputum How Obtained Suctioned;Endotracheal;Oral   Cough Non-productive   Spontaneous Breathing Trial (SBT) RT Doc   Pulse 76   Breath Sounds   Right Upper Lobe Clear   Right Middle Lobe Clear   Right Lower Lobe Diminished   Left Upper Lobe Clear   Left Lower Lobe Diminished   Additional Respiratory  Assessments   Resp 22   Position Semi-Mejía's   Oral Care Completed? Yes   Oral Care Mouth suctioned   Subglottic Suction Done? Yes   Alarm Settings   High Pressure Alarm 40 cmH2O   Low Minute Volume Alarm 3 L/min   Apnea (secs) 20 secs   High Respiratory Rate 45 br/min   Low Exhaled Vt  300 mL   ETT (adult)   Placement Date/Time: 07/06/21 1205   Preoxygenation: Yes  Mask Ventilation: Difficult mask ventilation (3)  Technique: Rapid sequence  Tube Size: 7.5 mm  Laryngoscope: GlideScope  Location: Oral  Placement Verified By[de-identified] Capnometry; Chest X-ray  Secured. ..    Secured at 26 cm   Measured From Lips   ET Placement Left   Secured By Commercial tube simon   Site Condition Dry

## 2021-07-09 NOTE — PROGRESS NOTES
Nephrology Progress Note   [facility]. [employer]      This patient is a 50year old male whom we are following for SUDHA, on CKRT. Subjective: The patient was seen and examined on CKRT. Remains on Levophed (decreased dose) and vasopressin. Tolerating UF goal to keep even. Anuric. Family History: Family at bedside and updated. ROS: No fever or chills      Vitals:  /72   Pulse 83   Temp 96.9 °F (36.1 °C) (Bladder)   Resp 23   Ht 6' 2\" (1.88 m)   Wt 285 lb 11.5 oz (129.6 kg)   SpO2 97%   BMI 36.68 kg/m²   I/O last 3 completed shifts: In: 2602.3 [I.V.:1099.3; NG/GT:40; IV [id number]]  Out: 2422 [Urine:55; Emesis/NG output:400; Drains:50; Stool:20]  I/O this shift: In: 509 [I.V.:121; IV Piggyback:388]  Out: 551 [Drains:20]    Physical Exam:  Physical Exam  Vitals reviewed. Constitutional:       Interventions: He is intubated. HENT:      Head: Normocephalic and atraumatic. Cardiovascular:      Rate and Rhythm: Normal rate. Heart sounds: No friction rub. Pulmonary:      Effort: He is intubated. Comments: Equal chest expansion, coarse breath sounds bilateral  Abdominal:      Tenderness: There is no abdominal tenderness. Comments: Percutaneous drain in the RUQ in place   Musculoskeletal:      Left lower leg: Edema present.        Access: LIJ temp HD catheter      Medications:   linezolid  600 mg Intravenous Q12H    insulin glargine  25 Units Subcutaneous BID    hydrocortisone sodium succinate PF  100 mg Intravenous Q24H    mupirocin   Nasal BID    insulin lispro  0-18 Units Subcutaneous Q4H    meropenem  1,000 mg Intravenous Q8H    aspirin  81 mg Oral Nightly    budesonide-formoterol  2 puff Inhalation BID    pantoprazole  40 mg Intravenous Daily    sodium chloride flush  5-40 mL Intravenous 2 times per day         Labs:  Recent Labs     07/07/21  1152 07/08/21  0836 07/09/21  0530   WBC 36.7* 20.5* 21.0*   HGB 12.4* 11.7* 11.9*   HCT 40.7 38.1* 39.2*   MCV 80.0 78.5* 78.8*    69* 73*     Recent Labs     07/08/21  1420 07/08/21  2215 07/09/21  0530   * 133* 132*   K 4.3 4.1 4.1   CL 95* 96* 96*   CO2 23 23 23   GLUCOSE 204* 156* 149*   PHOS 1.9* 1.7* 1.8*   MG 2.40 2.30 2.30   BUN 15 15 13   CREATININE 0.6* <0.5* <0.5*   LABGLOM >60 >60 >60   GFRAA >60 >60 >60           Assessment/Plan:    Acute Kidney Injury:    - Etiology:  ATN / Septic shock  - CKRT started on 7/6/21. Access LIJ temp HD catheter. - UF goal of 50cc/hr as tolerated. - Good solute clearance and requiring frequent electrolyte replacement, will decrease effluent dose.     Septic Shock with MOSF.  - Etiology: Cholangitis   - Clinical:  GI consulted, on pressors and broad spectrum antibiotics. - Incomplete ERCP, failed cannulation on 7/6/21.  - Leukocytosis improving/stable.     Metabolic Acidosis. - From lactic acidosis and SUDHA. - Improving. Continue CKRT.     Respiratory Failure:  Intubated on vent. Per pulmonary. Please do not hesitate to contact me at [phone] if with questions. Thank you! Fede Jim MD, MD  7/9/2021  [facility] [place]. [facility]

## 2021-07-09 NOTE — CARE COORDINATION
INTERDISCIPLINARY PLAN OF CARE CONFERENCE    Date/Time: 7/9/2021 10:19 AM  Completed by: Nicolasa Junior RN, Case Management      Patient Name:  Kirti Morris  YOB: 1965  Admitting Diagnosis: Septic shock ([facility] [address] [A41.9, R65.21]     Admit Date/Time:  7/6/2021 12:28 AM    Chart reviewed. Interdisciplinary team contacted or reviewed plan related to patient progress and discharge plans. Disciplines included Case Management, Nursing, and Dietitian. Current Status:inpt  PT/OT recommendation for discharge plan of care: tbd    Expected D/C Disposition:  tbd    Discharge Plan Comments: Reviewed chart. Pt in ICU on Ventilator. Pt from home with spouse. Will need PT rec when appropriate. Plan to be decided pending medical progress. Following.     Home O2 in place on admit: No
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
## 2021-07-09 NOTE — PROGRESS NOTES
Pharmacy - RE:  Low-dose Heparin drip  Current rate = 15.7 ml/h  (1570 units/h)  aPTT drawn @ 1230 = 77.4 sec. Goal aPTT = 60 - 90 sec. Per protocol, continue with same rate of  15.7 ml/h (1570 units/h). Obtain another aPTT 7/9 @ 1900.   Malaika Levy D 6/8/801561:94 PM  .

## 2021-07-09 NOTE — PROGRESS NOTES
Initial exam completed- See doc flowsheet for assessment findings. Pt resting quietly in bed and denies any complaints of pain or shortness of breath. RT at bedside and pt placed on SBT. Levophed gtt at 8 mcg and Vasopressin at 0.03 units. All lines and monitoring devices are in place. CRRT running well and on target to keep fluid balance even  Vitals and SpO2 stable. Call light is within easy reach. Plan of care and goals reviewed. Patient is not able to demonstrate the ability to move from a reclining position to an upright position within the recliner due to Pt on bedrest vent and CRRT .    Makayla Barrientos RN

## 2021-07-10 NOTE — CONSULTS
Pharmacy Note  Vancomycin Consult    Kirti Morris is a 50 y.o. male started on Vancomycin for intra-abdominal infection consult received from Dr. Rock Bustillos to manage therapy. Also receiving the following antibiotics: Meropenem IV. 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 ([address]    Iron deficiency anemia due to chronic blood loss    Lymphedema of both lower extremities    Neurogenic bladder    Neurogenic bowel    Hypergranulation    Dehiscence of surgical wound of T-spine    Onychomycosis    Dystrophic nail    Ischemic cardiomyopathy    Gitelman syndrome    Acute on chronic systolic congestive heart failure (HCC)    LIBORIO on CPAP    Pressure ulcer of ischium, stage 2, right ([facility])    Hyperglycemia due to diabetes mellitus ([facility])    Type 2 diabetes mellitus with diabetic peripheral angiopathy without gangrene, with long-term current use of insulin ([facility])    Ulcer of chest wall with fat layer exposed, following recent abscess    Moderate persistent asthma without complication    Abscess of chest wall (left side)    Septic shock ([facility])    Acute hypoxemic respiratory failure ([facility])    Cholangitis    SUDHA (acute kidney injury) ([facility])    Elevated LFTs    Lactic acidosis    Choledocholithiasis    Bacteremia    Thrombocytopenia ([facility])     Allergies:  Benadryl [diphenhydramine hcl], Statins [statins], Cephalexin, Morphine, Penicillins, and Sulfa antibiotics     Temp max: 98.4    Recent Labs     07/09/21  0530 07/09/21  1408 07/09/21  2215 07/10/21  0515   BUN 13   < > 13 13   CREATININE <0.5*   < > 0.6* 0.6*   WBC 21.0*  --   --  18.4*    < > = values in this interval not displayed.        Intake/Output Summary (Last 24 hours) at 7/10/2021 1321  Last data filed at 7/10/2021 1100  Gross per 24 hour   Intake 2704.59 ml   Output 3274 ml   Net -569.41 ml       Ht Readings from Last 1 Encounters:   07/09/21 6' 2\" (1.88 m)        Wt Readings from Last 1 Encounters:   07/09/21 285 lb 11.5 oz (129.6 kg)       Body mass index is 36.68 kg/m². Assessment/Plan:    Patient is currently on CRRT. Will initiate Vancomycin with a one time loading dose of 1500 mg IVPB x 1 today. Will draw vancomycin random level tomorrow morning and will pulse dose vancomycin based on random level results. Thank you for the consult. Will continue to follow.

## 2021-07-10 NOTE — PROGRESS NOTES
Nephrology Progress Note   [facility]      This patient is a 50year old male whom we are following for SUDHA, on CKRT. Subjective: The patient was seen and examined; he feels well today with no CP, SOB, nausea or vomiting. ROS: No fever or chills. Social: Family at bedside. Vitals:  /64   Pulse 83   Temp 98.4 °F (36.9 °C) (Bladder)   Resp 26   Ht 6' 2\" (1.88 m)   Wt 285 lb 11.5 oz (129.6 kg)   SpO2 98%   BMI 36.68 kg/m²   I/O last 3 completed shifts: In: 3243.6 [I.V.:1370.1; IV Piggyback:1873.5]  Out: 1766 [Urine:10; Drains:115]  I/O this shift: In: 850 [P.O.:50; I.V.:800]  Out: 494 [Urine:5]    Physical Exam:  General appearance: Seems comfortable, no acute distress. Neck: Trachea midline, thyroid normal.   Lungs:  Non labored breathing, CTA to anterior auscultation. Heart:  S1S2 normal, rub or gallop. + peripheral edema. Abdomen: Soft, non-tender, no organomegaly. Skin: No lesions or rashes, warm to touch.    Access: LIJ temp HD catheter      Medications:   vancomycin  1,500 mg Intravenous Once    [START ON 7/11/2021] vancomycin (VANCOCIN) intermittent dosing (placeholder)   Other RX Placeholder    insulin glargine  25 Units Subcutaneous BID    hydrocortisone sodium succinate PF  100 mg Intravenous Q24H    mupirocin   Nasal BID    insulin lispro  0-18 Units Subcutaneous Q4H    meropenem  1,000 mg Intravenous Q8H    aspirin  81 mg Oral Nightly    budesonide-formoterol  2 puff Inhalation BID    pantoprazole  40 mg Intravenous Daily    sodium chloride flush  5-40 mL Intravenous 2 times per day         Labs:  Recent Labs     07/08/21  0836 07/09/21  0530 07/10/21  0515   WBC 20.5* 21.0* 18.4*   HGB 11.7* 11.9* 12.2*   HCT 38.1* 39.2* 39.7*   MCV 78.5* 78.8* 78.6*   PLT 69* 73* 61*     Recent Labs     07/09/21  1408 07/09/21  2215 07/10/21  0515   * 127* 128*   K 4.1 4.0 4.3   CL 97* 93* 94*   CO2 24 23 23   GLUCOSE 133* 172* 161*   PHOS 2.6 1.6* 2.2*   MG 2.20 2. 30 2.40   BUN 13 13 13   CREATININE <0.5* 0.6* 0.6*   LABGLOM >60 >60 >60   GFRAA >60 >60 >60         Assessment/Plan:      Acute Kidney Injury:    - Etiology:  ATN / Septic shock  - CRRT started on 7/6/21. Access LIJ temp HD catheter. - UF goal of 50cc/hr as tolerated.     Septic Shock with MOSF.  - Etiology: Cholangitis   - Clinical:  GI consulted, on pressors and broad spectrum antibiotics. - Incomplete ERCP, failed cannulation on 7/6/21.  - Leukocytosis improving/stable.     Metabolic Acidosis. - From lactic acidosis and SUDHA. - Improving, continue CRRT.     Respiratory Failure:  Intubated on vent. Per pulmonary.

## 2021-07-10 NOTE — PROGRESS NOTES
Bedside handoff report to [facility] for transfer of care. IV drips, CRRT orders, wounds, equiptment, IV tubing and monitoring hx reviewed.

## 2021-07-10 NOTE — PROGRESS NOTES
Pharmacy - RE:  Low-dose Heparin drip  Current rate = 15.7 ml/h  (1570 units/h)  aPTT drawn @ 0515 = 88.3 sec. Goal aPTT = 60 - 90 sec. Per protocol, continue same rate and daily aPTT draws. Next aPTT draw due 7/11 with AM lab draws.
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
## 2021-07-10 NOTE — PROGRESS NOTES
Pt turned and repositioned to L side. All wounds covered with mepilex and appear to be healing. Cloth placed under wires and tubes to protect skin.

## 2021-07-10 NOTE — PROGRESS NOTES
Pharmacy - RE:  Low-dose Heparin drip  Current rate = 15.7 ml/h  (1570 units/h)  aPTT drawn @ 1925 = 68.9 sec. Goal aPTT = 60 - 90 sec. Per protocol, continue same rate, and since this is the second consecutive therapeutic aPTT, may switch to daily aPTT draws. Next aPTT due 7/10 with AM lab draws.

## 2021-07-10 NOTE — PROGRESS NOTES
In good spirits with children visiting. PO intake improving with meals. Continuing on CRRT per orders,   Vasopressive IV drips continue at:  Levophed at 6 mcg/min. Vasopressin at 0.03 u/kg/min    ICU monitoring lines in place. 02 decreased to 2 l/min/nc. Repositioned earlier for comfort, Call light in reach.

## 2021-07-10 NOTE — PROGRESS NOTES
Pt C/O being cold after warming blanket increased to high heat. REturn line for CRRT started with a warmer for pt comfort. Pt turned and repositioned for comfort.

## 2021-07-10 NOTE — PROGRESS NOTES
Sulfa Antibiotics Rash     Onset Date: 7/6/2021  Current Diet Level:  IDDSI 5 / Honey Thick     Pain:  Pain Assessment  Pain Assessment: 0-10  Pain Level: 0  Patient's Stated Pain Goal: No pain  Pain Location: Head  Pain Descriptors: Headache  RASS Score: Alert and calm    Subjective: Pt was alert and agreeable to tx this session. Family present. Nurse OK'd ST to see pt. Pain: Pt did not report any pain. Current Diet: ADULT DIET; Dysphagia - Minced and Moist; 4 carb choices (60 gm/meal); Moderately Thick (Honey)    Diet Tolerance:  Patient tolerating current diet level without signs/symptoms of penetration / aspiration. P.O. Trials: Thin       Nectar / Mildly Thick   x x3   Honey / Moderately Thick   x x2 via cup   x2 via straw    Pudding / Extremely Thick       Puree       Solid   x x4 Minced and Moist      Dysphagia Treatment and Impressions:  PO trials of all solids resulted in no s/s of penetration / aspiration. Min residue observed and easily cleared with a liquid wash. Educated on swallow strategies and alternating bites / sips. Po trials of honey with no s/s of penetration / aspiration observed. Mild throat clears observed with nectar effectively clearing vocal quality. 02 was stable throughout all Po trials. RR was inconsistent prior to starting Po trials, ranging between 18-28 breaths per min while talking. Recommend MBSS with goal to upgrade diet and rule out any silent aspiration risk. Nurse stated she would order the MBSS. Dysphagia Goals:  Timeframe for Long-term Goals: 7 days (07/16/2021)  Goal 1: Pt will tolerate safest and least restrictive diet with no overt s/s of aspiration or worsening respiratory status. Targeted 7/10      Short-term Goals  Timeframe for Short-term Goals: 5 days (07/14/2021)  Dysphagia Goals: The patient will tolerate recommended diet without observed clinical signs of aspiration.  Targeted 7/10   The patient/caregiver will demonstrate understanding of compensatory strategies for improved swallowing safety. Targeted 7/10   The patient will tolerate instrumental swallowing procedureTargeted 7/10     Speech/Language/Cog Goals: Not targeted this session. Recommendations:  Solid Consistency: IDDSI 5   Liquid Consistency: Honey   Medication: Whole in puree. Patient/Family/Caregiver Education: Pt was educated on role of ST, and treatment results and recommendations. Compensatory Strategies: Sit up for all meals and thereafter for 30 minutes, Eat with small bites (1/2 tsp; 1 tsp) and Take your medication with apple sauce; alternate bites and sips; small bites and sips. Plan:    Continued Dysphagia treatment with goals per plan of care. Discharge Recommendations: TBD. If pt discharges from hospital prior to Speech/Swallowing discharge, this note serves as tx and discharge summary.      Total Treatment Time / Charges     Time in Time out Total Time / units   Cognitive Tx         Speech Tx      Dysphagia Tx 0955 1020 25 min / 1 unit      Signature:  Ganesh Sunshine [address] [facility] SLP   [phone]
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
## 2021-07-11 NOTE — CONSULTS
7-11-21 (0620) vancomycin trough 10.4. Please give vancomycin 1 gram ivpb x1 dose at 1000 on 7-11-21. Please check vancomycin trough 7-12-21 at 0600 and pulse dose vancomycin per pharmacy protocol. [address] [place] [address]. Ph.  7/11/2021 7:36 AM

## 2021-07-11 NOTE — PROGRESS NOTES
0.7*   LABGLOM >60 >60 >60   GFRAA >60 >60 >60         Assessment/Plan:      Acute Kidney Injury:    - Etiology:  ATN / Septic shock  - CRRT started on 7/6/21. Access LIJ temp HD catheter. - UF goal of 50cc/hr as tolerated.     Septic Shock with MOSF.  - Etiology: Cholangitis   - Clinical:  GI consulted, on pressors and broad spectrum antibiotics. - Incomplete ERCP, failed cannulation on 7/6/21.  - Leukocytosis improving/stable.     Metabolic Acidosis. - From lactic acidosis and SUDHA. - Improving, continue CRRT.     Respiratory Failure:  Intubated on vent. Per pulmonary.
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
## 2021-07-11 NOTE — FLOWSHEET NOTE
Blood pressure (!) 147/91, pulse 79, temperature 98.2 °F (36.8 °C), temperature source Bladder, resp. rate 18, height 6' 2\" (1.88 m), weight 285 lb 11.5 oz (129.6 kg), SpO2 100 %. LEVOPHED infusing at a rate of 6 mcg/min.
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
## 2021-07-11 NOTE — CONSULTS
7-11-21 (0600) aptt 83.2 sec. Please continue heparin drip at 1570 units/hr ( 15.7 ml/hr ). Please recheck aptt at 0600 on 7-12-21. [facility]. .  7/11/2021 7:48 AM    7/17/2021  Recent Labs     07/17/21  1741   APTT 223.9*     Hold heparin gtt for 1 hr and decrease heparin gtt to 610 units/hr. Recheck aPTT in 6 hours.   Cheri Calvo, PharmHARPER  7/17/2021 6:38 PM

## 2021-07-11 NOTE — PROGRESS NOTES
Bedside handoff report to Chenta and Lc Villar RN for transfer of care. CRRT orders/machine reviewed. IV meds reviewed. Colostomy, JESUSITA drain status reviewed. All ICU monitoring leads in place, medical tubing in place. POC and future testing reviewed. Chastity Patel called earlier at [address] with patient request for medneb tx due to ineffective/weak cough. Call light and juan suction catheter in reach. Denies needs presently.

## 2021-07-11 NOTE — PROGRESS NOTES
Dr. Faulkner Read rounding, see orders. Will titrate vasopressin IV drip, then Levophed IV drip off as able. See eMAR. Cough assist tx's ordered as needed. Family members visiting at bedside, [address] reviewed with verbalized understanding. Call light in reach.

## 2021-07-11 NOTE — PROGRESS NOTES
Patient alert/oriented x 4. Denies pain or discomfort at this time. T- 99.7, Pulse - 101, R- 25, B. P - 97/62, O2- 93% R. A. Lung sounds noted with rhonchi. Patient tries to cough up mucous but not always able to get it up. Abdomen soft, BS hypoactive x 4 quads. Edema continues to BUE and Left lower extremities. Pillows used for elevating extremities at this time. Patient refused turns at this time. VASOPRESSIN infusing at 0.03 units/min  LEVOPHED infusing at a rate of 6 mcg/min. Heparin infusing at 15.7 mL/hr    CRRT continues per order. Patient tolerating well.

## 2021-07-12 NOTE — PROGRESS NOTES
AM labs drawn, patient turned and repositioned, requested apple juice given honey thick. Patient denies SOB, rhonchi remains, weak cough. CRRT in progress, levophed at 3mcg/min, heparin infusing at 15.7ml/hr, awaiting APTT.

## 2021-07-12 NOTE — PROGRESS NOTES
At 1605 CRRT filter nearing end of life and TMP and PD pressures trending higher.  Blood returned / Filter changed using strict sterile technique and treatment resumed at [address]

## 2021-07-12 NOTE — FLOWSHEET NOTE
07/12/21 0400   Vital Signs   Temp 97.7 °F (36.5 °C)   Temp Source Bladder   Pulse 93   Heart Rate Source Monitor   Resp 19   BP 95/68   BP Location Left upper arm   MAP (mmHg) 77   Level of Consciousness Alert (0)   MEWS Score 2   Pain Assessment   Prater-Baker Pain Rating 0   Oxygen Therapy   SpO2 95 %   O2 Device None (Room air)   Patient reassessment complete, rhonchi noted, weak cough, secretions suctioned, BS hypoactive, no new stool, JESUSITA draining 15-20 ml every hour light brown and thinner. UOP 0-5 ml hour increases when replacements infusing. Patient denies needs, denies pain, call light in reach. Turned and repositioned, will continue to monitor. Levophed at 3mcg/min. Heparin infusing. Labs due at 0600.

## 2021-07-12 NOTE — PROGRESS NOTES
Pharmacy Vancomycin Consult     Vancomycin Day: 3  Current Dosing: Pulse  Current indication: Bacteremia    Temp max:      Recent Labs     07/11/21  0620 07/11/21  1410 07/11/21  2222 07/12/21  0555   BUN 14   < > 14 13   CREATININE 0.7*   < > 0.6* 0.6*   WBC 17.1*  --   --  13.7*    < > = values in this interval not displayed. Intake/Output Summary (Last 24 hours) at 7/12/2021 0811  Last data filed at 7/12/2021 0800  Gross per 24 hour   Intake 2833 ml   Output 3854 ml   Net -1021 ml     Culture Date      Source                       Results  Blood: ecoli and enterococcus    Ht Readings from Last 1 Encounters:   07/09/21 6' 2\" (1.88 m)        Wt Readings from Last 1 Encounters:   07/12/21 270 lb (122.5 kg)       Body mass index is 34.67 kg/m². Estimated Creatinine Clearance: 198 mL/min (A) (based on SCr of 0.6 mg/dL (L)).     Random: 12    Assessment/Plan:  Vanc 1gm today, continue to pulse dose while on CRRT  Pito Lazar Pharm D 7/12/20218:13 AM  .

## 2021-07-12 NOTE — PROGRESS NOTES
Nephrology Progress Note   [facility]      This patient is a 50year old male whom we are following for SUDHA, on CKRT. Subjective: The patient was seen and examined on CRRT and UF-50 ml/h  On levophed at 3 mcg/min    ROS: No fever or chills. Social: Family at bedside. Vitals:  /67   Pulse 85   Temp 98 °F (36.7 °C) (Bladder)   Resp 26   Ht 6' 2\" (1.88 m)   Wt 270 lb (122.5 kg)   SpO2 97%   BMI 34.67 kg/m²   I/O last 3 completed shifts: In: 9154 [P.O.:550; I.V.:1653; IV Piggyback:652]  Out: 0834 [Urine:79; Drains:355; Stool:150]  I/O this shift:  In: 1624 [P.O.:60; I.V.:249; IV Piggyback:731]  Out: 5736 [Urine:7; Drains:110]    Physical Exam:  General appearance: Seems comfortable, no acute distress. Neck: Trachea midline, thyroid normal.   Lungs:  Non labored breathing, CTA to anterior auscultation. Heart:  S1S2 normal, rub or gallop. + peripheral edema. Abdomen: Soft, non-tender, no organomegaly. Skin: No lesions or rashes, warm to touch.    Access: LIJ temp HD catheter      Medications:   hydrocortisone sodium succinate PF  50 mg Intravenous Q24H    vancomycin (VANCOCIN) intermittent dosing (placeholder)   Other RX Placeholder    insulin glargine  25 Units Subcutaneous BID    insulin lispro  0-18 Units Subcutaneous Q4H    meropenem  1,000 mg Intravenous Q8H    aspirin  81 mg Oral Nightly    budesonide-formoterol  2 puff Inhalation BID    pantoprazole  40 mg Intravenous Daily    sodium chloride flush  5-40 mL Intravenous 2 times per day     Labs:  Recent Labs     07/10/21  0515 07/11/21  0620 07/12/21  0555   WBC 18.4* 17.1* 13.7*   HGB 12.2* 12.3* 11.7*   HCT 39.7* 40.3* 38.0*   MCV 78.6* 78.4* 78.2*   PLT 61* 113* 102*     Recent Labs     07/11/21 2222 07/12/21  0555 07/12/21  1412   * 132* 130*   K 3.8 4.2 3.9   CL 98* 99 98*   CO2 24 24 24   GLUCOSE 222* 111* 169*   PHOS 2.7 2.3* 2.4*   MG 2.10 2.20 2.10   BUN 14 13 14   CREATININE 0.6* 0.6* 0.7*   LABGLOM >60 >60 >60   GFRAA >60 >60 >60         Assessment/Plan:  Acute Kidney Injury:    - Etiology:  ATN / Septic shock  - CRRT started on 7/6/21. Access LIJ temp HD catheter. - Increase UF goal of 100cc/hr as tolerated.     Septic Shock with MOSF.  - Etiology: Cholangitis   - Clinical:  GI consulted, on pressors and broad spectrum antibiotics. - Incomplete ERCP, failed cannulation on 7/6/21.  - Leukocytosis improving/stable.     Metabolic Acidosis. - From lactic acidosis and SUDHA. - Improving, continue CRRT.     Respiratory Failure:  Intubated on vent. Per pulmonary.

## 2021-07-12 NOTE — PROGRESS NOTES
Care rounds completed with Dr Andres Acosta and multidisciplinary team.  Reviewed labs, meds, VS (temp/HR/RR), I/O's, assessment, & plan of care for today. See progress note & new orders for details.  Kari Perez RN
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
## 2021-07-12 NOTE — FLOWSHEET NOTE
07/11/21 2234   Vital Signs   Pulse 86   Resp 14   BP (!) 88/59   MAP (mmHg) 69   Oxygen Therapy   SpO2 99 %   Patient levophed increased to 3mcg/min, patient turned and repositioned, a/o, congested cough, thick secretions suctioned from mouth, weak cough, will continue to monitor. Call light in hand.
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
## 2021-07-12 NOTE — PROGRESS NOTES
Speech Language Pathology  SLP Attempt Note        Name: Kuldeep Rosenbaum  : 1965  Medical Diagnosis: Septic shock ([facility] [address] [A41.9, R65.21]    Discussed with Radiology and RN. Pt currently on CRRT. There is talk of him coming off of CRRT today but he remains on levo and likely needs additional fluid removal. Will hold on MBS today and complete follow-up in room as able. RN and radiology aware and in agreement.  Thank you,    Ramon Ruiz M.A., [facility]  Speech-Language Pathologist  Phone: [phone]

## 2021-07-12 NOTE — PROGRESS NOTES
Initial exam completed- See doc flowsheet for assessment findings. Pt resting quietly in bed and denies any complaints of pain or shortness of breath. All lines and monitoring devices are in place . Vitals and SpO2 stable. Levophed gtt at 3 mcg. Call light is within easy reach. CRRT running well and on target with fluid removal goal.  Plan of care and goals reviewed.  Daniela Bocanegra RN

## 2021-07-12 NOTE — PROGRESS NOTES
Patient assessment complete, A/O, remains on CRRT, on room air, levophed continued at 2mcg/min, MAP at 83, heparin infusing as ordered, APTT ordered daily. Patient's jaundice appearance improving, abdomen no longer taught except in side/flank area, JESUSITA appearance brown, has tolerated minced and moist diet today, no nausea, swallow eval in am. Lung sounds rhonchi noted upper lobes. Edema BLE +2 pitting. Trace of urine in langley, ostomy soft stool noted, BS more active, left foot pedal pulse +1 palpable. Call light in reach, will continue to  Monitor.

## 2021-07-12 NOTE — PROGRESS NOTES
Patient is not able to demonstrate the ability to move from a reclining position to an upright position within the recliner due to On bedrest and CRRT .  Casa Almeida RN

## 2021-07-12 NOTE — ACP (ADVANCE CARE PLANNING)
Advance Care Planning   Healthcare Decision Maker:    Primary Decision Maker: Bunny Arriola [facility] - [phone]    Click here to complete Healthcare Decision Makers including selection of the Healthcare Decision Maker Relationship (ie \"Primary\").

## 2021-07-12 NOTE — PROGRESS NOTES
PROGRESS NOTE  S:55 yrs Patient  admitted on 7/6/2021 with Septic shock ([facility] [address] [A41.9, R65.21] . Today he feels improved. He is tolerating diet, and passing brown BMs per colostomy. He is on CRRT and low dose levophed. Exam:   Vitals:    07/12/21 0905   BP: (!) 75/54   Pulse: 82   Resp: 16   Temp:    SpO2: 100%      General appearance: alert, appears stated age, cooperative and no distress  HEENT: Oropharynx clear, no lesions  Neck: supple, symmetrical, trachea midline  Lungs: clear to auscultation bilaterally  Heart: regular rate and rhythm, S1, S2 normal, no murmur, click, rub or gallop  Abdomen: soft, non-tender; bowel sounds normal; no masses,  no organomegaly  Extremities: edema 2+ BLE     Medications: Reviewed    Labs:  CBC:   Recent Labs     07/10/21  0515 07/11/21  0620 07/12/21  0555   WBC 18.4* 17.1* 13.7*   HGB 12.2* 12.3* 11.7*   HCT 39.7* 40.3* 38.0*   MCV 78.6* 78.4* 78.2*   PLT 61* 113* 102*     BMP:   Recent Labs     07/11/21  1410 07/11/21  2222 07/12/21  0555   * 132* 132*   K 4.0 3.8 4.2   CL 98* 98* 99   CO2 26 24 24   PHOS 1.4* 2.7 2.3*   BUN 11 14 13   CREATININE <0.5* 0.6* 0.6*     LIVER PROFILE:   Recent Labs     07/10/21  0515 07/11/21  0620 07/12/21  0555   * 377* 149*   * 604* 370*   PROT 6.6 7.0 6.5   BILIDIR 4.5* 4.1* 3.5*   BILITOT 5.5* 5.2* 4.4*   ALKPHOS 279* 290* 241*     Attending Supervising [de-identified] Attestation Statement  The patient is a 50 y.o. male. I have performed a history and physical examination of the patient. I discussed the case with my physician assistant Rebecca Fang PA-C    I reviewed the patient's Past Medical History, Past Surgical History, Medications, and Allergies.      Physical Exam:  Vitals:    07/12/21 1630 07/12/21 1700 07/12/21 1730 07/12/21 1800   BP: 110/70 112/65 121/66 112/66   Pulse: 82 86 91 87   Resp: 20 19 18 16   Temp:   99.3 °F (37.4 °C)    TempSrc:   Bladder    SpO2: 100% 99%

## 2021-07-13 NOTE — PLAN OF CARE
Nutrition Problem #1: Altered nutrition-related lab values  Intervention: Food and/or Nutrient Delivery: Continue Current Diet  Nutritional Goals: patient will consume 75% or greater of meals on ADULT DIET;  Dysphagia - Minced and Moist; 4 carb choices diet order + Moderately-Thick Liquids x 3 meals per day without BS > 180 mg/dl
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
## 2021-07-13 NOTE — PROGRESS NOTES
Vancomycin Day # 4  Current dose = Pulse dosing per daily vancomycin levels while on CRRT.  BUN/SRCR 14/ < 0.5        WBC   12.1            Tmax 98.7  Vancomycin random level this am = 12.6 mcg/ml  Will give vancomycin 1000 mg x1 dose today and recheck random level tomorrow am.  Pharmacy will continue to obtain daily random vancomycin levels and redose as appropriate.

## 2021-07-13 NOTE — PROGRESS NOTES
Attempting to titrate levophed down, patient with low SBP. Titrated levophed up, decreased amount of fluid removal from CRRT this hour to assist with BP. LEVOPHED infusing at a rate of 4 mcg/min.

## 2021-07-13 NOTE — PROGRESS NOTES
Pharmacy - RE:  Low-dose Heparin drip  Current rate = 14.8 ml/h  (1480 units/h)  aPTT drawn @ 1400 = 106.1sec. Goal aPTT = 60 - 90 sec.   Per protocol, hold heparin drip for 1 hour and decrease rate to 1190 units/hr (11.9 ml/hr)  Next Ptt at [address] [facility] D 7/13/20212:49 PM  .

## 2021-07-13 NOTE — PROGRESS NOTES
Reassessment complete. Patient seen awake in bed. Family is at bedside. CRRT continues running without difficulty. Patient assisted with repositioning in bed. Patient denies needs at this time. Call light and personal belongings within reach.

## 2021-07-13 NOTE — PROGRESS NOTES
Morning assessment complete. Patient seen awake in bed. Patient is A/O x 4. Patient is currently on room air. CRRT currently running without difficulty. Patient is tolerating well at this time. LEVOPHED infusing at a rate of 3 mcg/min. HEPARIN infusing at a rate of 14.8 mL/hr. Central line dressing is clean, dry and intact with no signs of drainage. All tubing is current and dated. IPA caps applied to all access hubs. Peripheral IV C/D/I and functioning properly. Delgado intact and draining britney urine. Colostomy C/D/I with no output this hour. JESUSITA drain emptied and returned to bulb suction. Patient repositioned and assisted with repositioning at this time. Patient educated on use of call light and to call out with needs, verbalized understanding. Call light and personal belongings within reach.
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
## 2021-07-13 NOTE — PROGRESS NOTES
Blood pressure 102/66, pulse 95, temperature 98.7 °F (37.1 °C), temperature source Bladder, resp. rate 22, height 6' 2\" (1.88 m), weight 270 lb (122.5 kg), SpO2 97 %. Patient resting with eyes closed. No s.s of pain or discomfort. Coughing noted occasionally this shift. Rhonchi noted to lung sounds. CRRT running okay at this time. Flow parameters are: , DIALYSATE 1500, POST 500. Fluid removal goal is 100 per nephrology. Patient tolerating well at this time. LEVOPHED infusing at a rate of 3 mcg/min. Heparin infusing at a rate of 15.7 ml/H     No other changes this shift.      Electronically signed by Sherly Clancy RN on 7/13/2021 at 6:35 AM

## 2021-07-13 NOTE — PROGRESS NOTES
Penicillins Rash    Sulfa Antibiotics Rash     Onset Date: The patient was admitted to [facility] on 07/06/2021  Subjective: The patient was seen in room at bedside with RN permission    Pain: The patient does not complain of pain       Current Diet: ADULT DIET; Dysphagia - Minced and Moist; 4 carb choices (60 gm/meal); Moderately Thick (Honey)    Diet Tolerance:  Patient tolerating current diet level without signs/symptoms of penetration / aspiration. Dysphagia Treatment and Impressions:   Pt seen in room at bedside with RN permission   Chart Review/Interview: no concerns reported or noted in chart    Respiratory Status: Pt with SPO2% of 97 on  RA with RR of 18   Liquid PO Trials:    IDDSI 0 Thin:  Assessed via cup/straw. With cup sips, the patient is noted with suspected timely swallow, no anterior bolus loss, no coughing / choking, no clinical s/s of aspiration. Suspect timely swallow with thin liquids via straw, however, noted with delayed and weak cough following sips of thin via straw.  IDDSI 2 Mildly Thick (nectar):  Assessed via cup. No anterior bolus loss, suspect timely swallow, good A-P bolus transit, vitals stable, no clinical s/s of aspiration and dry vocal quality   Solid PO Trials   IDDSI 7 Regular:   No anterior bolus loss, suspect timely swallow, grossly functional mastication, good A-P bolus transit, stasis noted to lingual surface post swallow, delayed cough, vitals stable, no clinical s/s of aspiration and dry vocal quality     Education: SLP edu pt re: Role of SLP, Rationale for dysphagia tx, Recommended compensatory strategies, Aspiration precautions, BSE results, POC, Evidenced based practice for current recommendations and treatment, Rationale for MBS, Anatomical components of swallow structures as they pertain to airway protection and Importance of oral care to reduce adverse affects in the event of aspiration.  Pt verbalized understanding and RN aware of recommendations   Assessment: Pt tolerating current diet with no clinical s/s of aspiration. Good/Poor carryover of recommended compensatory strategies   Recommendations: SLP recommends to advance to IDDSI 7 Regular Solids- easy to chew; IDDSI 0 Thin Liquids - NO straws ; Meds whole with thin liquids   Risk Management: upright for all intake, stay upright for at least 30 mins after intake, small bites/sips, assist feed, no straws, 1:1 supervision with intake, oral care 2-3x/day to reduce adverse affects in the event of aspiration, increase physical mobility as able, slow rate of intake, general aspiration precautions and hold PO and contact SLP if s/s of aspiration or worsening respiratory status develop. . If the patient exhibits s/s of aspiration and/or worsening respiratory status, hold PO and contact SLP. Dysphagia Goals:  Timeframe for Long-term Goals: 7 days (07/16/2021)  Goal 1: Pt will tolerate safest and least restrictive diet with no overt s/s of aspiration or worsening respiratory status  7/13/2021 : Ongoing, progressing. Short-term Goals  Timeframe for Short-term Goals: 5 days (07/14/2021)  Goal 1: The patient will tolerate recommended diet without observed clinical signs of aspiration   7/13/2021 : Goal addressed, see above. Ongoing, progressing. Goal 2: The patient/caregiver will demonstrate understanding of compensatory strategies for improved swallowing safety. .  7/13/2021 : Goal addressed, see above. Ongoing, progressing. Goal 3: The patient will tolerate instrumental swallowing procedure  7/13/2021 : Goal addressed, see above. Ongoing, progressing. Pt unable to complete MBSS while on CRRT. Will re-visit need once off CRRT.       Speech/Language/Cog Goals: N/A    Recommendations:  Solid Consistency: IDDSI 7 Regular Solids- Easy to Chew  Liquid Consistency: IDDSI 0 Thin Liquids - NO straws   Medication: Meds whole with thin liquids    Patient/Family/Caregiver Education: See above    Compensatory Strategies: See above    Plan:    Continued Dysphagia treatment with goals per plan of care. Discharge Recommendations: TBD    If pt discharges from hospital prior to Speech/Swallowing discharge, this note serves as tx and discharge summary.      Total Treatment Time / Charges     Time in Time out Total Time / units   Cognitive Tx         Speech Tx      Dysphagia Tx 1520 1536 16 mins / 1 unit     Signature:  Laya Bull M.A., [id number] [facility] #[id number]  Speech-Language Pathologist  Phone: [phone]  Fax: [phone]  Desk: [phone]           Kary Redding, BRITTANY

## 2021-07-13 NOTE — PROGRESS NOTES
Blood pressure 117/70, pulse 81, temperature 98 °F (36.7 °C), temperature source Bladder, resp. rate 16, height 6' 2\" (1.88 m), weight 270 lb (122.5 kg), SpO2 100 %. Patient is Alert/Oriented x 4. Denies pain at this time. CRRT running okay at this time. Flow parameters are: , DIALYSATE 1500, POST 500. Fluid removal goal is 100 per nephrology. Patient tolerating well at this time. LEVOPHED infusing at a rate of 5 mcg/min. Heparin infusing at a rate of 15.7ml/H  Replacements continue as needed. Lung sounds noted with rhonchi. Patient is coughing up thick phlegm frequently. No s.s of respiratory distress. Abdomen soft non tender. BS + x 4 quads. Edema continues to BUE and right leg. Pillows being used to elevate all extremities.      Electronically signed by Elsie Olivera RN on 7/12/2021 at 8:23 PM
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
## 2021-07-13 NOTE — PROGRESS NOTES
Pharmacy - RE:  Low-dose Heparin drip  Current rate = 15.7 ml/h  (1570 units/h)  aPTT drawn @ 0612 = 91.3 sec. Goal aPTT = 60 - 90 sec. Per protocol, decrease drip to 1480 units/h (14.8 ml/h). Obtain another aPTT @ 1400.
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
## 2021-07-13 NOTE — FLOWSHEET NOTE
Blood pressure 132/74, pulse 90, temperature 98 °F (36.7 °C), temperature source Bladder, resp. rate 29, height 6' 2\" (1.88 m), weight 270 lb (122.5 kg), SpO2 98 %. LEVOPHED infusing at a rate of 4 mcg/min.    Changed from rate of 5 mcg/min, to 4 mcg/min

## 2021-07-14 NOTE — PROGRESS NOTES
Dr. Kinsey Torres at bedside. VO for midodrine 10 mg TID. VO to stop CRRT at shift change tonight if able to turn off levophed. If not able to wean down levophed after administration of midodrine, decrease removal rate to -50 mL/hr.

## 2021-07-14 NOTE — PROGRESS NOTES
No changes noted in assessment. SpO2 % since oxygen placed. CRRT continues. Levophed titrated according to b/p. Will continue to monitor.

## 2021-07-14 NOTE — PROGRESS NOTES
Vancomycin Day # 5  Current dose = Pulse dosing per daily vancomycin levels while on CRRT.  BUN/SRCR 12/ 0.6        WBC               Tmax 98.1  Vancomycin random level this am = 11.4 mcg/ml  Will give vancomycin 1000 mg x1 dose today and recheck random level tomorrow am.  Pharmacy will continue to obtain daily random vancomycin levels and redose as appropriate.

## 2021-07-14 NOTE — CONSULTS
[facility] Wound Ostomy Continence Nurse  Follow-up Progress Note       NAME:  [address] RECORD NUMBER:  [id number]  AGE:  50 y.o. GENDER:  male  :  1965  TODAY'S DATE:  2021    Subjective: Pt is awake, alert, receiving CRRT. States dressings are changed every other day outside of the hospital.     Wound Identification:  Wound Type: diabetic  Contributing Factors: diabetes and poor glucose control     Breast wounds assessed today. Pt receiving CRRT, will reeval wounds to backside on Friday Hopefully. Staff changing all dressings as ordered. Exposed hardware on spine, no plan for hardware removal.  Silver dressing in place. Patient Goal of Care:  [x] Wound Healing  [] Odor Control  [] Palliative Care  [] Pain Control   [] Other:     Objective:    /62   Pulse 75   Temp 97.6 °F (36.4 °C) (Bladder)   Resp 26   Ht 6' 2\" (1.88 m)   Wt 268 lb 4.8 oz (121.7 kg)   SpO2 99%   BMI 34.45 kg/m²   Ismael Risk Score: Ismael Scale Score: 14  Assessment:   Measurements:  Wound 21 #62, Right Buttock cluster, Pressure Injury, Stage 2, Onset 1/15/21 (Active)   Wound Image   21 1033   Wound Etiology Pressure Stage  2 21 1534   Dressing Status Clean;Dry; Intact 21 1534   Wound Cleansed Wound cleanser 21 1837   Dressing/Treatment Alginate with Ag; Foam 21 1534   Wound Length (cm) 0.1 cm 21 1037   Wound Width (cm) 0.1 cm 21 1037   Wound Depth (cm) 0.1 cm 21 1037   Wound Surface Area (cm^2) 0.01 cm^2 21 1037   Change in Wound Size % (l*w) 99.8 21 1037   Wound Volume (cm^3) 0.001 cm^3 21 1037   Wound Healing % 100 21 1037   Wound Assessment Pink/red 21 183   Drainage Amount Scant 21 183   Drainage Description Serosanguinous 21 183   Odor None 21   Chetna-wound Assessment Intact 21 1837   Number of days: 159       Wound 21 #63, Right Chest Wall (inframammary), Abscess, Full Thickness, Onset 2/16/21 (Active)   Wound Image   06/25/21 1033   Wound Etiology Other 07/13/21 0510   Dressing Status Clean;Dry; Intact 07/14/21 1534   Wound Cleansed Wound cleanser 07/13/21 1837   Dressing/Treatment Alginate with Ag; Foam 07/14/21 1534   Wound Length (cm) 0.8 cm 07/02/21 1037   Wound Width (cm) 3 cm 07/02/21 1037   Wound Depth (cm) 0.6 cm 07/02/21 1037   Wound Surface Area (cm^2) 2.4 cm^2 07/02/21 1037   Change in Wound Size % (l*w) -11.11 07/02/21 1037   Wound Volume (cm^3) 1.44 cm^3 07/02/21 1037   Wound Healing % -235 07/02/21 1037   Undermining Starts ___ O'Clock 3 07/07/21 1635   Undermining Ends___ O'Clock 6 07/07/21 1635   Undermining Maxium Distance (cm) .9 07/07/21 1635   Wound Assessment Pink/red 07/13/21 1837   Drainage Amount Small 07/13/21 1837   Drainage Description Serosanguinous 07/13/21 1837   Odor None 07/13/21 1837   Chetna-wound Assessment Intact 07/13/21 1837   Number of days: 145       Wound 04/09/21 #67, Left Chest Wall Cluster (inframmatory),Abcess, Full Thickness, Onsret 4/7/21 (Active)   Wound Image   06/25/21 1033   Wound Etiology Other 07/13/21 0510   Dressing Status Clean;Dry; Intact 07/14/21 1534   Wound Cleansed Wound cleanser 07/13/21 1837   Dressing/Treatment Alginate with Ag; Foam 07/14/21 1534   Wound Length (cm) 0.5 cm 07/02/21 1037   Wound Width (cm) 3.5 cm 07/02/21 1037   Wound Depth (cm) 0.9 cm 07/02/21 1037   Wound Surface Area (cm^2) 1.75 cm^2 07/02/21 1037   Change in Wound Size % (l*w) -4275 07/02/21 1037   Wound Volume (cm^3) 1.575 cm^3 07/02/21 1037   Wound Healing % -32908 07/02/21 1037   Post-Procedure Length (cm) 0.7 cm 06/18/21 1121   Post-Procedure Width (cm) 0.4 cm 06/18/21 1121   Post-Procedure Depth (cm) 1.2 cm 06/18/21 1121   Post-Procedure Surface Area (cm^2) 0.28 cm^2 06/18/21 1121   Post-Procedure Volume (cm^3) 0.34 cm^3 06/18/21 1121   Distance Tunneling (cm) 1 cm 07/07/21 1635   Tunneling Position ___ O'Clock 3 07/07/21 1635   Wound Assessment Pink/red 07/13/21 1837   Drainage Amount Small 07/13/21 1837   Drainage Description Serosanguinous 07/13/21 1837   Odor None 07/13/21 1837   Chetna-wound Assessment Intact 07/13/21 1837   Number of days: 96       Wound 05/14/21 #71, left buttocks cluster, pressure stage 2, onset 5/14/21 (Active)   Wound Image   06/25/21 1033   Wound Etiology Pressure Stage  2 07/14/21 1534   Dressing Status Intact 07/14/21 1534   Wound Cleansed Wound cleanser 07/13/21 1837   Dressing/Treatment Foam 07/14/21 1534   Wound Length (cm) 0.6 cm 07/02/21 1037   Wound Width (cm) 0.5 cm 07/02/21 1037   Wound Depth (cm) 0.1 cm 07/02/21 1037   Wound Surface Area (cm^2) 0.3 cm^2 07/02/21 1037   Change in Wound Size % (l*w) 90 07/02/21 1037   Wound Volume (cm^3) 0.03 cm^3 07/02/21 1037   Wound Healing % 90 07/02/21 1037   Wound Assessment Pink/red 07/13/21 1837   Drainage Amount Small 07/13/21 1837   Drainage Description Serosanguinous 07/13/21 1837   Odor None 07/13/21 1837   Chetna-wound Assessment Fragile 07/13/21 1837   Number of days: 61       Wound 06/25/21 #72, Right Medial Knee, Pressure Injury, Stage 2, Onset 6/22/21 (Active)   Wound Image   06/25/21 1033   Wound Etiology Pressure Stage  2 07/14/21 1534   Dressing Status Intact 07/14/21 1534   Wound Cleansed Wound cleanser 07/13/21 1837   Dressing/Treatment Other (comment) 07/14/21 1534   Wound Length (cm) 1.1 cm 07/02/21 1037   Wound Width (cm) 0.4 cm 07/02/21 1037   Wound Depth (cm) 0.1 cm 07/02/21 1037   Wound Surface Area (cm^2) 0.44 cm^2 07/02/21 1037   Change in Wound Size % (l*w) 18.52 07/02/21 1037   Wound Volume (cm^3) 0.044 cm^3 07/02/21 1037   Wound Healing % 12 07/02/21 1037   Wound Assessment Pink/red 07/13/21 1837   Drainage Amount None 07/14/21 1534   Drainage Description Sanguinous 07/06/21 2355   Odor None 07/13/21 1837   Chetna-wound Assessment Fragile 07/13/21 1837   Number of days: 19       Wound 06/25/21 #73, Right Inner Thigh, Pressure Injury, Stage 2, Onset 6/22/21 (Active)   Wound Image   07/02/21 1037   Wound Etiology Pressure Stage  2 07/14/21 1534   Dressing Status Intact 07/13/21 1837   Wound Cleansed Wound cleanser 07/13/21 1837   Dressing/Treatment Other (comment) 07/14/21 1534   Wound Length (cm) 0 cm 07/02/21 1037   Wound Width (cm) 0 cm 07/02/21 1037   Wound Depth (cm) 0 cm 07/02/21 1037   Wound Surface Area (cm^2) 0 cm^2 07/02/21 1037   Change in Wound Size % (l*w) 100 07/02/21 1037   Wound Volume (cm^3) 0 cm^3 07/02/21 1037   Wound Healing % 100 07/02/21 1037   Wound Assessment Pink/red 07/13/21 1837   Drainage Amount None 07/13/21 1837   Drainage Description Sanguinous 07/06/21 2355   Odor None 07/13/21 1837   Chetna-wound Assessment Intact 07/13/21 1837   Number of days: 19       Wound 06/25/21 #74, Right Knee, Pressure Injury, Stage 2, Onset 6/22/21 (Active)   Wound Image   06/25/21 1033   Wound Etiology Pressure Stage  2 07/14/21 1534   Dressing Status Intact 07/13/21 1837   Wound Cleansed Wound cleanser 07/13/21 1837   Dressing/Treatment Other (comment) 07/14/21 1534   Dressing Change Due 07/12/21 07/10/21 1100   Wound Length (cm) 0.1 cm 07/02/21 1037   Wound Width (cm) 0.1 cm 07/02/21 1037   Wound Depth (cm) 0.1 cm 07/02/21 1037   Wound Surface Area (cm^2) 0.01 cm^2 07/02/21 1037   Change in Wound Size % (l*w) 95 07/02/21 1037   Wound Volume (cm^3) 0.001 cm^3 07/02/21 1037   Wound Healing % 95 07/02/21 1037   Wound Assessment Pink/red 07/13/21 1837   Drainage Amount None 07/13/21 1837   Drainage Description Sanguinous 07/06/21 2355   Odor None 07/13/21 1837   Chetna-wound Assessment Intact 07/13/21 1837   Number of days: 19       Wound 06/25/21 #75, Posterior Scrotum, Shearing, Full Thickness, Onset 6/25/21 (Active)   Wound Image   06/25/21 1033   Wound Etiology Other 07/13/21 0510   Dressing Status Intact 07/14/21 1534   Wound Cleansed Wound cleanser 07/13/21 1837   Dressing/Treatment Other (comment);Dry dressing 07/13/21 0510 2/16/21-Dressing/Treatment: Alginate with Ag, Foam    Specialty Bed Required : Yes on ICU ATILIO surface   [] Low Air Loss   [] Pressure Redistribution  [] Fluid Immersion  [] Bariatric  [] Total Pressure Relief  [] Other:     Current Diet: ADULT DIET; Regular; 4 carb choices (60 gm/meal);  No Drinking Straws  Dietician consult:  Yes    Discharge Plan:  Placement for patient upon discharge: home with support   Patient appropriate for Outpatient [address]: Yes-sees Dr Pollard     [facility]:  []   [facility]  [x] Supplies  [] Other    Patient/Caregiver Teaching:  Level of patient/caregiver understanding able to:   [] Indicates understanding       [] Needs reinforcement  [] Unsuccessful      [x] Verbal Understanding  [] Demonstrated understanding       [] No evidence of learning  [] Refused teaching         [] N/A       Electronically signed by Aleksandra Lino RN, CWOCN on 7/14/2021 at 3:37 PM

## 2021-07-14 NOTE — PROGRESS NOTES
Reassessment complete. Patient continues awake in bed. CRRT continues without difficulty. Physical assessment unchanged. Patient repositioned for comfort. Call light and personal belongings within reach. LEVOPHED infusing at a rate of 2 mcg/min.

## 2021-07-14 NOTE — PROGRESS NOTES
Pharmacy - RE:  Low-dose Heparin drip  Current rate = 11.9 ml/h  (1190 units/h)  aPTT drawn @ 0630= 83.7sec. Goal aPTT = 60 - 90 sec. Per protocol, continue same rate and obtain another aPTT 7/14 with AM lab draws.    Lucinda Concepcion Pharm D 1/59/[id number] AM  .

## 2021-07-14 NOTE — PROGRESS NOTES
No changes noted in assessment. Patient resting with no distress noted SpO2 does drop at time when patient is sleeping able to recover after waking patient and taking deep breath. CRRT continues. Will continue to monitor current plans of care.

## 2021-07-14 NOTE — PROGRESS NOTES
Pharmacy - RE:  Low-dose Heparin drip  Current rate = 11.9 ml/h  (1190 units/h)  aPTT drawn @ 2215 = 81.5 sec. Goal aPTT = 60 - 90 sec. Per protocol, continue same rate and obtain another aPTT 7/14 with AM lab draws.

## 2021-07-14 NOTE — PROGRESS NOTES
2. 10 2.10   BUN 14 14 12   CREATININE 0.7* 0.8* 0.6*   LABGLOM >60 >60 >60   GFRAA >60 >60 >60         Assessment/Plan:  Acute Kidney Injury:    - Etiology:  ATN / Septic shock  - CRRT started on 7/6/21. Access LIJ temp HD catheter. - cont UF goal of 100cc/hr as tolerated. -wean off levophed and take off CRRT on 7/14 evening  -midodrine 10 mg TID for BP     Septic Shock with MOSF.  - Etiology: Cholangitis   - Clinical:  GI consulted, on pressors and broad spectrum antibiotics. - Incomplete ERCP, failed cannulation on 7/6/21.  - Leukocytosis improving/stable.     Metabolic Acidosis. - From lactic acidosis and SUDHA.   - Improving, continue CRRT.     Respiratory Failure: Rodger Hanson

## 2021-07-14 NOTE — PROGRESS NOTES
Shift assessment completed and document. Patient resting in bed watching television with no c/o at this time. CRRT running well with target removal rate of 100 cc per hour. Levophed and heparin gtt's continue-see MAR for any titration. JESUSITA and Colostomy bag are intact. Vitals and SpO2 are stable. Call light in reach. Will continue to monitor current plans of care and goals.
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
## 2021-07-14 NOTE — PROGRESS NOTES
Morning assessment complete. Patient seen awake in bed. Patient is A/O x 4. Patient denies pain at this time. Nasal cannula oxygen removed r/t patient is awake and saturations 100%. Lung sounds are diminished bilaterally. Heart rate is sinus rhythm with PVCs on bedside monitor, rates in the 80's. Bowel sounds present x 4, ostomy pink and moist, emptied at this time. + 1 pitting edema to BUE, + 2 pitting edema to BLE. Patient pale, slightly jaundice. Dressings to all wounds C/D/I at this time. JESUSITA drains to RUQ C/D/I, to bulb suction. Scheduled medications given per orders. LEVOPHED infusing at a rate of 2 mcg/min. HEPARIN infusing at a rate of 11.9 mL/hr. Central line dressing is clean, dry and intact with no signs of drainage. All tubing is current and dated. IPA caps applied to all access hubs. Peripheral IV C/D/I and fuctioning properly. Delgado intact and draining britney urine. Patient repositioned for comfort. Patient assisted with breakfast tray. Patient educated on use of call light and to call out with needs, verbalized understanding. Call light and personal belongings within reach.
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
## 2021-07-14 NOTE — PROGRESS NOTES
Speech Language Pathology  Facility/Department: [facility] ICU  Dysphagia Daily Treatment Note    NAME: David Goode  : 1965  MRN: [id number]    Patient Diagnosis(es):   Patient Active Problem List    Diagnosis Date Noted    Hyponatremia     Bacteremia     Thrombocytopenia ([address]     Choledocholithiasis     Septicemia ([address] 2021    Acute hypoxemic respiratory failure (HCC)     Cholangitis     SUDHA (acute kidney injury) ([address]     Elevated LFTs     Lactic acidosis     Abscess of chest wall (left side) 2021    Moderate persistent asthma without complication     Ulcer of chest wall with fat layer exposed, following recent abscess 2021    Type 2 diabetes mellitus with diabetic peripheral angiopathy without gangrene, with long-term current use of insulin ([address] 2020    Hyperglycemia due to diabetes mellitus ([address] 10/03/2019    Pressure ulcer of ischium, stage 2, right ([address] 2019    LIBORIO on CPAP     Acute on chronic systolic congestive heart failure (HCC)     Gitelman syndrome 2018    Ischemic cardiomyopathy 2017    Onychomycosis 07/10/2017    Dystrophic nail 07/10/2017    Dehiscence of surgical wound of T-spine 2017    Hypergranulation 2016    Iron deficiency anemia due to chronic blood loss 2015    Lymphedema of both lower extremities 2015    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 ([address] 2015    Chronic back pain 2014    Arthritis 2014    Paraplegia, complete ([address] 03/10/2014    Neurogenic bladder 10/10/2013    Neurogenic bowel 10/10/2013    Mixed hyperlipidemia 2010    Coronary artery disease involving native coronary artery of native heart without angina pectoris 2010     Allergies:    Allergies   Allergen Reactions    Benadryl [Diphenhydramine Hcl] Anaphylaxis     Throat swelling    Statins [Statins]     Cephalexin Rash    Morphine Anxiety     Hallucinations     recommendations   Assessment: Pt tolerating current diet with no clinical s/s of aspiration. Good/Poor carryover of recommended compensatory strategies   Recommendations: SLP recommends to advance to IDDSI 7 Regular Solids; IDDSI 0 Thin Liquids - NO straws ; Meds whole with thin liquids   Risk Management: upright for all intake, stay upright for at least 30 mins after intake, small bites/sips, assist feed, no straws, 1:1 supervision with intake, oral care 2-3x/day to reduce adverse affects in the event of aspiration, increase physical mobility as able, slow rate of intake, general aspiration precautions and hold PO and contact SLP if s/s of aspiration or worsening respiratory status develop. . If the patient exhibits s/s of aspiration and/or worsening respiratory status, hold PO and contact SLP. Dysphagia Goals:  Timeframe for Long-term Goals: 7 days (07/16/2021)  Goal 1: Pt will tolerate safest and least restrictive diet with no overt s/s of aspiration or worsening respiratory status  7/14/2021 : Ongoing, progressing. Short-term Goals  Timeframe for Short-term Goals: 5 days (07/14/2021)  Goal 1: The patient will tolerate recommended diet without observed clinical signs of aspiration   7/14/2021 : Goal addressed, see above. Ongoing, progressing. Goal 2: The patient/caregiver will demonstrate understanding of compensatory strategies for improved swallowing safety. .  7/14/2021 : Goal addressed, see above. Ongoing, progressing. Goal 3: The patient will tolerate instrumental swallowing procedure  7/14/2021 : Goal addressed, see above. Ongoing, progressing. Pt unable to complete MBSS while on CRRT. MBSS may not be indicated d/t pt's continued diet tolerance of least restrictive diet -- will continue to monitor.     Speech/Language/Cog Goals: N/A    Recommendations:  Solid Consistency: IDDSI 7 Regular Solids  Liquid Consistency: IDDSI 0 Thin Liquids - NO straws   Medication: Meds whole with thin

## 2021-07-14 NOTE — PROGRESS NOTES
Reassessment complete. Patient continues awake in bed, family is at bedside. Physical assessment unchanged. CRRT continues to run without difficulty. Levophed stopped at 1500, SBP currently 90. Patient refused repositioning at this time.
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
## 2021-07-14 NOTE — PLAN OF CARE
Message left at 629am    45year old patient last seen in the office on 6/12/2020    Patient left a message about a refill of her ocp for a 6 month supply.     This nurse attempted to reach the patient and left a message for the patient to call the office back to confirm details of her request. Problem: Falls - Risk of:  Goal: Will remain free from falls  Description: Will remain free from falls  7/14/2021 0145 by Siena Torres RN  Outcome: Ongoing  7/13/2021 1415 by Kajal Jean RN  Outcome: Ongoing  Goal: Absence of physical injury  Description: Absence of physical injury  7/14/2021 0145 by Siena Torres RN  Outcome: Ongoing  7/13/2021 1415 by Kajal Jean RN  Outcome: Ongoing     Problem: Skin Integrity:  Goal: Will show no infection signs and symptoms  Description: Will show no infection signs and symptoms  7/14/2021 0145 by Siena Torres RN  Outcome: Ongoing  7/13/2021 1415 by Kajal Jean RN  Outcome: Ongoing  Goal: Absence of new skin breakdown  Description: Absence of new skin breakdown  7/14/2021 0145 by Siena Torres RN  Outcome: Ongoing  7/13/2021 1415 by Kajal Jean RN  Outcome: Ongoing     Problem: Nutrition  Goal: Optimal nutrition therapy  7/14/2021 0145 by Siena Torres RN  Outcome: Ongoing  7/13/2021 1523 by Nila Robertson RD, LD  Outcome: Met This Shift  7/13/2021 1415 by Kajal Jean RN  Outcome: Ongoing  Goal: Understanding of nutritional guidelines  7/14/2021 0145 by Siean Torres RN  Outcome: Ongoing  7/13/2021 1523 by Nila Robertson RD, LD  Outcome: Met This Shift  7/13/2021 1415 by Kajal Jean RN  Outcome: Ongoing

## 2021-07-15 NOTE — PROGRESS NOTES
Glucose 53, and 52 on recheck. Patient is alert and oriented x 4. Patient given thickened apple juice, 8 oz PO. Will  Recheck Glucose and continue to monitor patient. no

## 2021-07-15 NOTE — PROGRESS NOTES
Vancomycin Day # 6  Current dose = Pulse dosing per daily vancomycin levels while on CRRT.  BUN/SRCR 19/ 1.1        WBC  12.5        Tmax 99.8  Vancomycin random level this am =  17 mcg/ml  Will give vancomycin 750 mg x1 dose today and recheck random level tomorrow am.  Pharmacy will continue to obtain daily random vancomycin levels and redose as appropriate.   Jessica Bhatt, [facility]
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
## 2021-07-15 NOTE — PROGRESS NOTES
Shift assessment completed, see flow sheet. RASS 0. Following commands. Nasal Canula, 2 LPM while asleep    SpO2 97%. Respirations are easy, even, and unlabored. Bilateral lung sounds diminished and  rhonchrous in bilateral upper lobes. VSS  NSR on the monitor with PVCs. CVC/PIV, WNL with Heparin and NS infusing     All lines and monitoring devices in place. Delgado is patent and secured. Bed in lowest position with wheels locked. No needs expressed at this time.  Will continue to monitor

## 2021-07-15 NOTE — PROGRESS NOTES
Pharmacy - RE:  Low-dose Heparin drip  Current rate = 11.9 ml/h  (1190 units/h)  aPTT drawn @ 0434= 77.4 sec. Goal aPTT = 60 - 90 sec. Per protocol, continue same rate and obtain another aPTT 7/16 with AM lab draws.   MATTHEW Velez D [facility]

## 2021-07-15 NOTE — PROGRESS NOTES
Nephrology Progress Note   [facility]. [facility]      This patient is a 50year old male whom we are following for SUDHA, on CKRT. Subjective:  CRRT was stopped on 7/14  HD on 7/15 with 1.2 L UF over 3.5 hours, 25% of albumin 25 g x 2 given    ROS: No fever or chills. Social: Family at bedside. Vitals:  /64   Pulse 89   Temp 99.2 °F (37.3 °C) (Bladder)   Resp 23   Ht 6' 2\" (1.88 m)   Wt 266 lb 5.1 oz (120.8 kg)   SpO2 97%   BMI 34.19 kg/m²   I/O last 3 completed shifts: In: 1965.2 [P.O.:570; I.V.:432.4; IV Piggyback:362.8]  Out: 2668 [Urine:129; Drains:280; Stool:25]  I/O this shift: In: 496.1 [P.O.:240; I.V.:122.2; IV Piggyback:133.9]  Out: 116 [Urine:46; Drains:70]    Physical Exam:  General appearance: Seems comfortable, no acute distress. Neck: Trachea midline, thyroid normal.   Lungs:  Non labored breathing, CTA to anterior auscultation. Heart:  S1S2 normal, rub or gallop. + peripheral edema. Abdomen: Soft, non-tender, no organomegaly. Skin: No lesions or rashes, warm to touch.    Access: LIJ temp HD catheter      Medications:   midodrine  10 mg Oral TID WC    hydrocortisone sodium succinate PF  50 mg Intravenous Q24H    insulin glargine  25 Units Subcutaneous BID    insulin lispro  0-18 Units Subcutaneous Q4H    meropenem  1,000 mg Intravenous Q8H    aspirin  81 mg Oral Nightly    budesonide-formoterol  2 puff Inhalation BID    pantoprazole  40 mg Intravenous Daily    sodium chloride flush  5-40 mL Intravenous 2 times per day     Labs:  Recent Labs     07/13/21  0612 07/14/21  0630 07/15/21  0434   WBC 12.1* 10.6 12.5*   HGB 11.6* 12.2* 11.8*   HCT 37.4* 39.0* 38.0*   MCV 78.3* 78.9* 77.9*   PLT see below 90*  see below 125*     Recent Labs     07/14/21  2145 07/15/21  0434 07/15/21  0845   * 130* 134*   K 4.1 4.0 3.6   CL 98* 98* 102   CO2 23 23 23   GLUCOSE 111* 90 67*   PHOS 2.7 3.4 2.5   MG 2.00 2.00 1.90   BUN 15 19 14   CREATININE 0.9 1.1 0.8*   LABGLOM >60 >60 >60 GFRAA >60 >60 >60         Assessment/Plan:  Acute Kidney Injury:    - Etiology:  ATN / Septic shock  - CRRT from 7/6/2020 127/14/21 1. Access LIJ temp HD catheter. -HD from 7/15, will continue on Tuesday Thursday Saturday schedule  -We will need tunneled dialysis catheter   -midodrine 10 mg TID for BP     Septic Shock with MOSF.  - Etiology: Cholangitis   - Clinical:  GI consulted, on pressors and broad spectrum antibiotics. - Incomplete ERCP, failed cannulation on 7/6/21.  - Leukocytosis improving/stable.     Metabolic Acidosis. - From lactic acidosis and SUDHA.   -  resolved     Respiratory Failure: Limmie Alu
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
## 2021-07-15 NOTE — FLOWSHEET NOTE
Treatment time: 3.5 hours  Net UF: 1200 ml     Pre weight: 122 kg   Post weight: 120.8 kg  EDW: TBD kg     Access used: LIJ NTDC  Access function: Good, reversed with  ml/min     Medications or blood products given: 25% albumin 25 gram x 2, heparin for catheter dwells     Regular outpatient schedule: Acute     Summary of response to treatment:      07/15/21 1109   Vital Signs   BP (!) 112/53   Temp 97.5 °F (36.4 °C)   Pulse 89   Resp 22   SpO2 94 %   Weight 266 lb 5.1 oz (120.8 kg)   Percent Weight Change -2.42   Pain Assessment   Pain Assessment 0-10   Pain Level 0   Post-Hemodialysis Assessment   Post-Treatment Procedures Blood returned;Catheter capped, clamped and heparinized x 2 ports   Machine Disinfection Process Acid/Vinegar Clean;Bleach; Exterior Machine Disinfection   Rinseback Volume (ml) 400 ml   Total Liters Processed (l/min) 64.2 l/min   Dialyzer Clearance Moderately streaked   Duration of Treatment (minutes) 211 minutes   Heparin amount administered during treatment (units) 0 units   Hemodialysis Intake (ml) 600 ml   Hemodialysis Output (ml) 1800 ml   NET Removed (ml) 1200 ml   Tolerated Treatment Fair   Copy of dialysis treatment record placed in chart, to be scanned into EMR.  Report provided to [facility], RN at bedside.

## 2021-07-16 NOTE — PROGRESS NOTES
[facility] Patient resting in bed, stable condition at hand off.

## 2021-07-16 NOTE — PROGRESS NOTES
Reassessment complete, changes as per flowsheet. Patient resting in bed, appears comfortable. VSS. NSR rhythm on cardiac monitor. SpO2 in mid 90s on 3L NC (was desatting while sleeping on 2L). No needs voiced, will continue to closely monitor.

## 2021-07-16 NOTE — PROGRESS NOTES
Care rounds completed with Dr Tesha Gomez and multidisciplinary team.  Reviewed labs, meds, VS (temp/HR/RR), I/O's, assessment, & plan of care for today. See progress note & new orders for details.  Manuel Jim RN

## 2021-07-16 NOTE — PROGRESS NOTES
Reassessment, no major changes. Remains in NSR with VSS. BP on soft side but SBP >90 and MAP >65. Heparin gtt continues to infuse as per [facility]. All lines/monitors WNL. Will continue to monitor.

## 2021-07-16 NOTE — PROGRESS NOTES
Lab work resulted; part of CRRT order set. Patient is no longer on CRRT, therefore no PRN IV replacements given.  Will re-eval these orders with day team.

## 2021-07-16 NOTE — PROGRESS NOTES
Speech Language Pathology  SLP Attempt Note        Name: Yamileth Lin  : 1965  Medical Diagnosis: Septic shock ([facility] [address] [A41.9, R65.21]    Pt was transferring to different unit at time of attempt. Will re-attempt at a later time as SLP schedule allows.        [address] [place] [address]  Clinician    Genna Verdugo M.A., [address] [facility] #[id number]  Speech-Language Pathologist  Phone: [phone]

## 2021-07-16 NOTE — PROGRESS NOTES
Initial exam completed- See doc flowsheet for assessment findings. Pt resting quietly in bed and denies any complaints of pain or shortness of breath. All lines and monitoring devices are in place . Vitals and SpO2 stable. Call light is within easy reach. Plan of care and goals reviewed.  Jose L Bauman RN

## 2021-07-16 NOTE — PROGRESS NOTES
Pharmacy - RE:  Low-dose Heparin drip  Current rate = 11.9 ml/h  (1190 units/h)  aPTT drawn @ 0405= 79.1 sec. Goal aPTT = 60 - 90 sec. Per protocol, continue same rate and obtain another aPTT 7/17 with AM lab draws.   Ambreen [facility]
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
## 2021-07-16 NOTE — PROGRESS NOTES
Assessment complete as per flow sheets. BP on soft side but MAP remains adequate (>65), otherwise VSS. Patient is A/O x 4. Unlabored breathing at rest on 2L NC. NSR with PVC rhythm on cardiac monitor. Bowel sounds active on auscultation. Patient denies pain. Comfort measures provided. Patient voids per langley catheter. UO is minimal, tea colored and hazy in appearance. Colostomy with small amount of soft brown stool noted. Site/ stoma appear WNL. JESUSITA drain with moderate amount of brown colored output (about 50cc so far). PIV, port site, and vascath with pigtail central access appears WNL. Dressing is C/D/I. Discussed POC, labs, testing, medical equipment and unit routine. Answered questions at this time. Meds as per MAR. Safety measures in place and will continue to monitor.

## 2021-07-16 NOTE — PROGRESS NOTES
Bedside report and transfer of care given to Aleksandar Crane RN. Pt currently resting in bed with the call light within reach. Pt denies any other care needs at this time. Pt stable at this time.       Eli Aden RN

## 2021-07-16 NOTE — PROGRESS NOTES
Patient is not able to demonstrated the ability to move from a reclining position to an upright position within the recliner. however patient is alert, oriented and able to provide informed consent.  Pt on bedrest Makayla Barrientos RN

## 2021-07-16 NOTE — FLOWSHEET NOTE
07/16/21 1500   Vital Signs   Temp 97 °F (36.1 °C)   Temp Source Oral   Pulse 82   Heart Rate Source Monitor   Resp 18   BP 98/60   BP Location Left upper arm   Patient Position Semi fowlers   Level of Consciousness Alert (0)   MEWS Score 2   Patient Currently in Pain Denies   BP Upper/Lower Upper   Oxygen Therapy   SpO2 94 %   O2 Device None (Room air)   O2 Flow Rate (L/min) 0 L/min     Pt transferred to PCU from ICU at this time. Vitals as shown above. Family at bedside. 4 eyes completed with ICU nurse Jose Leiva; Completed. Pt stable at this time.     Arturo Yates RN
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
## 2021-07-16 NOTE — PROGRESS NOTES
Nephrology Progress Note   [facility]      This patient is a 50year old male whom we are following for SUDHA, on CKRT. Subjective:  CRRT was stopped on 7/14  HD on 7/15 with 1.2 L UF over 3.5 hours, 25% of albumin 25 g x 2 given  Resting    ROS: No fever or chills. Social: Family at bedside. Vitals:  /65   Pulse 98   Temp 98.3 °F (36.8 °C) (Bladder)   Resp 17   Ht 6' 2\" (1.88 m)   Wt 267 lb 6.4 oz (121.3 kg)   SpO2 100%   BMI 34.33 kg/m²   I/O last 3 completed shifts: In: 1756.9 [P.O.:420; I.V.:503; IV Piggyback:233.9]  Out: 2611 [Urine:171; Drains:315; Stool:325]  I/O this shift:  In: -   Out: 80 [Urine:29; Drains:65]    Physical Exam:  General appearance: Seems comfortable, no acute distress. Neck: Trachea midline, thyroid normal.   Lungs:  Non labored breathing, CTA to anterior auscultation. Heart:  S1S2 normal, rub or gallop. + peripheral edema. Abdomen: Soft, non-tender, no organomegaly. Skin: No lesions or rashes, warm to touch.    Access: LIJ temp HD catheter      Medications:   insulin glargine  15 Units Subcutaneous BID    [START ON 7/17/2021] meropenem  500 mg Intravenous Q24H    midodrine  10 mg Oral TID WC    insulin lispro  0-18 Units Subcutaneous Q4H    aspirin  81 mg Oral Nightly    budesonide-formoterol  2 puff Inhalation BID    pantoprazole  40 mg Intravenous Daily    sodium chloride flush  5-40 mL Intravenous 2 times per day     Labs:  Recent Labs     07/14/21  0630 07/15/21  0434 07/16/21  0405   WBC 10.6 12.5* 13.1*   HGB 12.2* 11.8* 10.9*   HCT 39.0* 38.0* 34.9*   MCV 78.9* 77.9* 78.3*   PLT 90*  see below 125* 84*     Recent Labs     07/15/21  0845 07/15/21  2230 07/16/21  0405   * 130* 131*   K 3.6 3.7 3.9    100 100   CO2 23 21 21   GLUCOSE 67* 105* 136*   PHOS 2.5 2.7 3.5   MG 1.90 1.70* 1.70*   BUN 14 15 18   CREATININE 0.8* 1.2 1.2   LABGLOM >60 >60 >60   GFRAA >60 >60 >60         Assessment/Plan:  Acute Kidney Injury:    - Etiology:  ATN / Septic shock  - CRRT from 7/6/2020 127/14/21 1. Access LIJ temp HD catheter. -HD from 7/15, will continue on Tuesday Thursday Saturday schedule  - tunneled dialysis catheter today or on Monday based on availability  -Outpatient HD unit arrangement  -midodrine 10 mg TID for BP     Septic Shock with MOSF.  - Etiology: Cholangitis   - Clinical:  GI consulted, on pressors and broad spectrum antibiotics. - Incomplete ERCP, failed cannulation on 7/6/21.  - Leukocytosis improving/stable.     Metabolic Acidosis. - From lactic acidosis and SUDHA.   -  resolved     Respiratory Failure: Shazia Whitten

## 2021-07-17 NOTE — PROGRESS NOTES
BG of 63. PCA provided patient with a glass of juice. RN asked PCA to recheck at 1130.     1130. BG now 71. PCA provided with another juice and will recheck in another 15 minutes. 1156: BG now 87. Informed Anna Sorensen NP. Of low BG.

## 2021-07-17 NOTE — PROGRESS NOTES
Penicillins Rash    Sulfa Antibiotics Rash     Onset Date: The patient was admitted to [facility] on 07/06/2021  Subjective: The patient was seen in room at bedside with RN permission. Son present during session. Pain: The patient does not complain of pain. Current Diet: ADULT DIET; Regular; 4 carb choices (60 gm/meal); Low Potassium (Less than 3000 mg/day); Low Phosphorus (Less than 1000 mg); 1500 ml; No Drinking Straws    Diet Tolerance:  Patient tolerating current diet level without signs/symptoms of penetration / aspiration. Dysphagia Treatment and Impressions:   Pt seen in room at bedside with RN permission   Chart Review/Interview: no concerns reported or noted in chart    Respiratory Status: Pt on RA with RR of 20   Liquid PO Trials:    IDDSI 0 Thin:  Assessed via cup. With cup sips, the patient is noted with suspected timely swallow, no anterior bolus loss, no coughing / choking, no clinical s/s of aspiration. Reviewed \"no straw\" recommendations and reasoning behind the recommendation. Reviewed to take small sips and alt bites and sips to clear any potential pharyngeal residue from solids.  IDDSI 2 Mildly Thick (nectar):  DNT   Solid PO Trials   IDDSI 7 Regular:   No anterior bolus loss, suspect timely swallow, grossly functional mastication, good A-P bolus transit, no oral / lingual residue post-swallow, no cough, vitals stable, no clinical s/s of aspiration and dry vocal quality. Reviewed recommendations to take small bites, eat slowly, and alt bites and sips.  Education: SLP edu pt re: Role of SLP, Rationale for dysphagia tx, Recommended compensatory strategies, Aspiration precautions, BSE results, POC, Evidenced based practice for current recommendations and treatment. Pt verbalized understanding and RN aware of recommendations   Assessment: Pt tolerating current diet with no clinical s/s of aspiration.  Good/Poor carryover of recommended compensatory per plan of care. Discharge Recommendations: TBD    If pt discharges from hospital prior to Speech/Swallowing discharge, this note serves as tx and discharge summary.      Total Treatment Time / Charges     Time in Time out Total Time / units   Cognitive Tx         Speech Tx      Dysphagia Tx 0950 1013 23 min / 1 unit     Signature:  Tamiko Camara [address] SLP   [id number]

## 2021-07-17 NOTE — FLOWSHEET NOTE
07/17/21 1030 07/17/21 1430   Vital Signs   BP (!) 109/48 132/69   Pulse 87 94   Weight 279 lb 1.6 oz (126.6 kg)  (1 pillow, 1 sheet and  bedsling left.) 278 lb 14.1 oz (126.5 kg)   Weight Method Bed scale  (1 pillow, 1 sheet and  bedsling left.) Bed scale  (1 pillow, 1 sheet and 1 bedsling left.)   Treatment time: 3.5 hours    Net UF: 100 ml removed. Pre weight: 126.6 kg    Post weight: 126.5 kg  EDW: TBD    Access used: Lt. IJ neck  Access function: Good with  ml/min    Medications or blood products given: Midodrine 10mg, Albumin 25% 25g *2    Regular outpatient schedule: Acute, TTS    Summary of response to treatment: low BP during tx. BP improved at the end of tx. Copy of dialysis treatment record placed in chart, to be scanned into EMR.

## 2021-07-17 NOTE — FLOWSHEET NOTE
07/17/21 0845   Vital Signs   Temp 97.6 °F (36.4 °C)   Temp Source Oral   Pulse 94   Heart Rate Source Monitor   Resp 16   /66   BP Location Left upper arm   Patient Position Semi fowlers   Level of Consciousness Alert (0)   MEWS Score 1   Patient Currently in Pain Denies   Oxygen Therapy   SpO2 95 %   Pulse Oximeter Device Mode Intermittent   Pulse Oximeter Device Location Finger   O2 Device None (Room air)   Patient is resting showing no s/s of distress. Patient is alert and oriented. Meds were given, see MAR. Patient is denying any needs. Bed is in lowest position and call light is within reach. Will continue to monitor. Shift assessment complete, see flowsheets. Mag replaced. Heparin gtt continues at 9.

## 2021-07-17 NOTE — PROGRESS NOTES
Pulmonary & Critical Care Medicine  Progress Note    CC: Septic shock    Events of Last 24 hours:  JESUSITA drain 445 ml/24 hours; patient feels better. Vascular lines: IV: Left IJ HD catheter  Port A cath     MV: -  Vent Mode: AC/VC Rate Set: 0 bmp/Vt Ordered: 0 mL/ /FiO2 : 30 %  No results for input(s): PHART, TEJ9SXT, PO2ART in the last 72 hours. IV:   heparin (PORCINE) Infusion 900 Units/hr (21 0700)    dextrose      sodium chloride Stopped (07/10/21 0813)       Vitals:  Blood pressure 99/60, pulse 89, temperature 96.9 °F (36.1 °C), temperature source Oral, resp. rate 14, height 6' 2\" (1.88 m), weight 251 lb 14.4 oz (114.3 kg), SpO2 93 %. on RA  Temp  Av.5 °F (36.4 °C)  Min: 96.9 °F (36.1 °C)  Max: 98.7 °F (37.1 °C)    Intake/Output Summary (Last 24 hours) at 2021 0829  Last data filed at 2021 0518  Gross per 24 hour   Intake 1130.22 ml   Output 634 ml   Net 496.22 ml     PE:  EXAM:  General: chronically ill appearing. Normocephalic, atraumatic. Eyes: PERRL. No sclera icterus. No conjunctival injection. ENT: No discharge. Pharynx clear. Neck: Trachea midline. Resp: No accessory muscle use. No crackles. No wheezing. + rhonchi. No dullness on percussion. CV: Regular rate. Regular rhythm. No mumur or rub. 1 + LLE edema. GI: Non-tender. Non-distended. Colostomy in place. Cholecystostomy drain - bilious clear fluid  Skin: Warm and dry. No nodule on exposed extremities. No rash on exposed extremities. M/S: No cyanosis. No joint deformity. No clubbing.  R BKA   Neuro: Alert, does not move LEs, conversant, follows commands      Scheduled Meds:   insulin glargine  15 Units Subcutaneous BID    meropenem  500 mg Intravenous Q24H    midodrine  10 mg Oral TID WC    insulin lispro  0-18 Units Subcutaneous Q4H    aspirin  81 mg Oral Nightly    budesonide-formoterol  2 puff Inhalation BID    pantoprazole  40 mg Intravenous Daily    sodium chloride flush  5-40 mL secretions clearance   · GI following   · Diet- regular with thin liquids  · GI prophylaxis: PPI  · DVT prophylaxis: Heparin drip transition to eliquis soon

## 2021-07-17 NOTE — PROGRESS NOTES
Handoff report given to LIONEL Prieto. Care transferred.      .Electronically signed by Ale Russ RN on 7/17/2021 at 7:34 AM

## 2021-07-17 NOTE — PROGRESS NOTES
Nephrology Progress Note   [facility] AND[facility]      This patient is a 50year old male whom we are following for SUDHA, on CKRT. Subjective:  CRRT was stopped on 7/14  HD on 7/15 with 1.2 L UF over 3.5 hours, 25% of albumin 25 g x 2 given    Seen and examined at HD on 7/17 with 1 L UF goal, keeping blood pressure over 100, as needed use of midodrine and albumin    Urine output low but picking up    ROS: No fever or chills. Social: No family at bedside. Vitals:  BP (!) 109/48   Pulse 87   Temp 97.6 °F (36.4 °C) (Oral)   Resp 16   Ht 6' 2\" (1.88 m)   Wt 251 lb 14.4 oz (114.3 kg)   SpO2 95%   BMI 32.34 kg/m²   I/O last 3 completed shifts: In: 1130.2 [P.O.:702; I.V.:228.2; IV Piggyback:200]  Out: 713 [Urine:268; Drains:445]  I/O this shift:  In: -   Out: 100 [Urine:100]    Physical Exam:  General appearance: Seems comfortable, no acute distress. Neck: Trachea midline, thyroid normal.   Lungs:  Non labored breathing, CTA to anterior auscultation. Heart:  S1S2 normal, rub or gallop. + peripheral edema. Abdomen: Soft, non-tender, no organomegaly. Skin: No lesions or rashes, warm to touch.    Access: LIJ temp HD catheter      Medications:   insulin glargine  15 Units Subcutaneous BID    meropenem  500 mg Intravenous Q24H    midodrine  10 mg Oral TID WC    insulin lispro  0-18 Units Subcutaneous Q4H    aspirin  81 mg Oral Nightly    budesonide-formoterol  2 puff Inhalation BID    pantoprazole  40 mg Intravenous Daily    sodium chloride flush  5-40 mL Intravenous 2 times per day     Labs:  Recent Labs     07/15/21  0434 07/16/21  0405 07/17/21  0500   WBC 12.5* 13.1* 14.5*   HGB 11.8* 10.9* 10.6*   HCT 38.0* 34.9* 34.3*   MCV 77.9* 78.3* 78.8*   * 84* 72*     Recent Labs     07/15/21  2230 07/16/21  0405 07/17/21  0500   * 131* 127*   K 3.7 3.9 3.9    100 98*   CO2 21 21 20*   GLUCOSE 105* 136* 79   PHOS 2.7 3.5 4.2   MG 1.70* 1.70* 1.60*   BUN 15 18 29*   CREATININE 1.2 1.2 2.0* LABGLOM >60 >60 35*   GFRAA >60 >60 42*         Assessment/Plan:  Acute Kidney Injury:    - Etiology:  ATN / Septic shock  - CRRT from 7/6/2020 127/14/21 1. Access LIJ temp HD catheter. -HD from 7/15, will continue on Tuesday Thursday Saturday schedule  - tunneled dialysis catheter today or on Monday based on availability  -Outpatient HD unit arrangement  -midodrine 10 mg TID for BP  -Monitor signs of renal recovery     Septic Shock with MOSF.  - Etiology: Cholangitis   - Clinical:  GI consulted, on pressors and broad spectrum antibiotics. - Incomplete ERCP, failed cannulation on 7/6/21.  - Leukocytosis improving/stable.     Metabolic Acidosis. - From lactic acidosis and SUDHA.   -  resolved     Respiratory Failure: Fidencio Vazquez

## 2021-07-17 NOTE — PROGRESS NOTES
Pharmacy - RE:  Low-dose Heparin drip  Current rate = 11.9 ml/h  (1190 units/h)  aPTT drawn @ 0500= 108.7 sec. Goal aPTT = 60 - 90 sec. Per protocol, hold heparin infusion for 1 hour, then restart at a rate of 900 units/hr (9 mL/hr) (at 0700). Will order next aPTT for 1300 today.   MATTHEW Carrizales [facility]

## 2021-07-17 NOTE — PROGRESS NOTES
aPTT  Is 223.9 RN called pharmacy and spoke with Artist Mario. Hold heparin gtt for an hour and then re-draw at [facility] resuming heparin gtt.

## 2021-07-17 NOTE — FLOWSHEET NOTE
07/17/21 1839   Wound 06/25/21 #72, Right Medial Knee, Pressure Injury, Stage 2, Onset 6/22/21   Date First Assessed/Time First Assessed: 06/25/21 1115   Primary Wound Type: Pressure Injury  Wound Description (Comments): #72, Right Medial Knee, Pressure Injury, Stage 2, Onset 6/22/21   Wound Etiology Pressure Stage  2   Dressing Status New dressing applied   Wound Cleansed Cleansed with saline   Dressing/Treatment Moisture barrier   Dressing Change Due 07/18/21   Wound Assessment Pink/red   Drainage Amount None   Drainage Description Serosanguinous   Odor None   Chetna-wound Assessment Fragile   Wound 06/25/21 #75, Posterior Scrotum, Shearing, Full Thickness, Onset 6/25/21   Date First Assessed/Time First Assessed: 06/25/21 1117   Primary Wound Type: Other (comment)  Wound Description (Comments): #75, Posterior Scrotum, Shearing, Full Thickness, Onset 6/25/21   Wound Etiology Other  (shearing)   Dressing Status New dressing applied   Wound Cleansed Cleansed with saline   Dressing/Treatment Other (comment);Dry dressing  (opticell)   Dressing Change Due 07/18/21   Wound Assessment Pink/red   Drainage Amount Small   Drainage Description Serosanguinous   Odor None   Chetna-wound Assessment Fragile   Wound 02/05/21 #62, Right Buttock cluster, Pressure Injury, Stage 2, Onset 1/15/21   Date First Assessed/Time First Assessed: 02/05/21 0844   Primary Wound Type: Pressure Injury  Wound Description (Comments): #62, Right Buttock cluster, Pressure Injury, Stage 2, Onset 1/15/21   Wound Etiology Pressure Stage  2   Dressing Status New dressing applied   Wound Cleansed Cleansed with saline   Dressing/Treatment Alginate with Ag; Foam   Wound Assessment Pink/red   Drainage Amount Scant   Drainage Description Serosanguinous   Odor None   Chenta-wound Assessment Intact   Wound 05/14/21 #71, left buttocks cluster, pressure stage 2, onset 5/14/21   Date First Assessed/Time First Assessed: 05/14/21 1106   Primary Wound Type: Pressure Injury  Wound Description (Comments): #71, left buttocks cluster, pressure stage 2, onset 5/14/21   Wound Etiology Pressure Stage  2   Dressing Status New dressing applied   Wound Cleansed Cleansed with saline   Dressing/Treatment Foam  (opticell)   Dressing Change Due 07/18/21   Wound Assessment Pink/red   Drainage Amount Small   Drainage Description Serosanguinous   Odor None   Chetna-wound Assessment Fragile   Wound 06/25/21 #74, Right Knee, Pressure Injury, Stage 2, Onset 6/22/21   Date First Assessed/Time First Assessed: 06/25/21 1116   Primary Wound Type: Pressure Injury  Wound Description (Comments): #74, Right Knee, Pressure Injury, Stage 2, Onset 6/22/21   Wound Etiology Pressure Stage  2   Dressing Status New dressing applied   Wound Cleansed Cleansed with saline   Dressing/Treatment ABD;Roll gauze   Dressing Change Due 07/18/21   Wound Assessment Pink/red   Drainage Amount None   Drainage Description Sanguinous   Odor None   Chetna-wound Assessment Intact   Wound 06/25/21 #73, Right Inner Thigh, Pressure Injury, Stage 2, Onset 6/22/21   Date First Assessed/Time First Assessed: 06/25/21 1116   Primary Wound Type: Pressure Injury  Wound Description (Comments): #73, Right Inner Thigh, Pressure Injury, Stage 2, Onset 6/22/21   Wound Etiology Pressure Stage  2   Dressing Status New dressing applied   Wound Cleansed Cleansed with saline   Dressing/Treatment Other (comment)  (mepilex)   Dressing Change Due 07/18/21   Wound Assessment Pink/red   Drainage Amount None   Drainage Description Sanguinous   Odor None   Chetna-wound Assessment Intact   Wound 04/09/21 #67, Left Chest Wall Cluster (inframmatory),Abcess, Full Thickness, Onsret 4/7/21   Date First Assessed/Time First Assessed: 04/09/21 1045   Primary Wound Type:  Other (comment)  Wound Description (Comments): #67, Left Chest Wall Cluster (inframmatory),Abcess, Full Thickness, Onsret 4/7/21   Dressing Status New dressing applied   Wound Cleansed Cleansed with saline   Dressing/Treatment Alginate with Ag; Foam   Dressing Change Due 07/18/21   Wound Assessment Pink/red   Drainage Amount Small   Drainage Description Serosanguinous   Odor None   Chetna-wound Assessment Intact   Wound 02/19/21 #63, Right Chest Wall (inframammary), Abscess, Full Thickness, Onset 2/16/21   Date First Assessed/Time First Assessed: 02/19/21 1009   Primary Wound Type: Other (comment)  Wound Description (Comments): #63, Right Chest Wall (inframammary), Abscess, Full Thickness, Onset 2/16/21   Dressing Status New dressing applied   Wound Cleansed Cleansed with saline   Dressing/Treatment Alginate with Ag; Foam   Dressing Change Due 07/18/21   Wound Assessment Pink/red   Drainage Amount Small   Drainage Description Serosanguinous   Odor None   Chetna-wound Assessment Intact   Colostomy LUQ   Removal Time: (c)  Placement Date/Time: (c) 07/06/21 (c) 0000   Pre-existing: Yes  Location: LUQ   Stomal Appliance 1 piece   Stoma  Assessment Pink   Mucocutaneous Junction Intact   Peristomal Assessment Intact   Treatment Site care   Stool Appearance Soft   Stool Color Brown   Stool Amount Large   Wound care changes completed at this time.
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
## 2021-07-17 NOTE — PROGRESS NOTES
PM assessment completed. Scheduled medications given per MAR. VSS room air, A/O x4 denies any needs at this time. Call light in reach, will monitor.

## 2021-07-18 NOTE — FLOWSHEET NOTE
07/18/21 0825   Vital Signs   Temp 98.2 °F (36.8 °C)   Temp Source Oral   Pulse 91   Heart Rate Source Monitor   Resp 20   /64   BP Location Left upper arm   Patient Position Semi fowlers   Level of Consciousness Alert (0)   MEWS Score 1   Patient Currently in Pain Denies   Oxygen Therapy   SpO2 91 %   Pulse Oximeter Device Mode Intermittent   Pulse Oximeter Device Location Finger   O2 Device None (Room air)   Patient is resting showing no s/s of distress. Patient is alert and oriented. Meds were given, see MAR. Patient is denying any needs. Bed is in lowest position and call light is within reach. Will continue to monitor. Shift assessment complete, see flowsheets. Mag replaced at this time.

## 2021-07-18 NOTE — PROGRESS NOTES
Nephrology Progress Note   [facility]. [facility]      This patient is a 50year old male whom we are following for SUDHA, on CKRT. Subjective:  CRRT was stopped on 7/14  HD on 7/15 with 1.2 L UF over 3.5 hours, 25% of albumin 25 g x 2 given  HD on 7/17 with 286 ml net UF, complicated by hypotension needing midodrine and IV albumin 25 g x 2    Urine output in last 24 hours is 375 mL    ROS: No fever or chills. Social: family at bedside. Vitals:  /64   Pulse 91   Temp 98.2 °F (36.8 °C) (Oral)   Resp 20   Ht 6' 2\" (1.88 m)   Wt 271 lb (122.9 kg)   SpO2 91%   BMI 34.79 kg/m²   I/O last 3 completed shifts: In: 2581.5 [P.O.:1016; I.V.:341.5; IV Piggyback:124]  Out: 9342 [Urine:375; Drains:225; RFMDK:9368]  I/O this shift:  In: 360 [P.O.:360]  Out: 100 [Urine:100]    Physical Exam:  General appearance: Seems comfortable, no acute distress. Neck: Trachea midline, thyroid normal.   Lungs:  Non labored breathing, CTA to anterior auscultation. Heart:  S1S2 normal, rub or gallop. + peripheral edema. Abdomen: Soft, non-tender, no organomegaly. Skin: No lesions or rashes, warm to touch.    Access: LIJ temp HD catheter      Medications:   magnesium sulfate  2,000 mg Intravenous Once    insulin glargine  15 Units Subcutaneous Nightly    meropenem  500 mg Intravenous Q24H    midodrine  10 mg Oral TID     insulin lispro  0-18 Units Subcutaneous Q4H    aspirin  81 mg Oral Nightly    budesonide-formoterol  2 puff Inhalation BID    pantoprazole  40 mg Intravenous Daily    sodium chloride flush  5-40 mL Intravenous 2 times per day     Labs:  Recent Labs     07/16/21  0405 07/17/21  0500 07/18/21  0540   WBC 13.1* 14.5* 11.7*   HGB 10.9* 10.6* 10.1*   HCT 34.9* 34.3* 32.6*   MCV 78.3* 78.8* 79.5*   PLT 84* 72* 71*     Recent Labs     07/16/21  0405 07/17/21  0500 07/18/21  0540   * 127* 131*   K 3.9 3.9 4.2    98* 101   CO2 21 20* 19*   GLUCOSE 136* 79 99   PHOS 3.5 4.2 3.0   MG 1.70* 1.60* 1.60*   BUN 18 29* 18   CREATININE 1.2 2.0* 1.5*   LABGLOM >60 35* 49*   GFRAA >60 42* 59*         Assessment/Plan:  Acute Kidney Injury:    - Etiology:  ATN / Septic shock  - CRRT from 7/6/2020 till 7/14/21 1. Access LIJ temp HD catheter.  -iHD initiated 7/15   -will continue on Tuesday Thursday Saturday schedule or match outpatient schedule   - tunneled dialysis on Monday   -Outpatient HD unit arrangement  -midodrine 10 mg TID for BP  -Monitor signs of renal recovery     Septic Shock with MOSF.  - Etiology: Cholangitis   - Clinical:  GI consulted, on pressors and broad spectrum antibiotics. - Incomplete ERCP, failed cannulation on 7/6/21.  - Leukocytosis improving/stable.     Metabolic Acidosis. - From lactic acidosis and SUDHA.   -  resolved     Respiratory Failure: Marianne Ruvalcaba

## 2021-07-18 NOTE — PROGRESS NOTES
APTT was >248. Called pharmacy and spoke with Verona Dasilva. Verona Dasilva request that we speak with the medical team, and request it be discontinued. Autumn sent to  and asked if gtt could be discontinued. He states to continue gtt and hold the gtt at 0700. This information was told to Verona Dasilva in pharmacy and [facility].

## 2021-07-18 NOTE — FLOWSHEET NOTE
07/18/21 1644   Wound 06/25/21 #72, Right Medial Knee, Pressure Injury, Stage 2, Onset 6/22/21   Date First Assessed/Time First Assessed: 06/25/21 1115   Primary Wound Type: Pressure Injury  Wound Description (Comments): #72, Right Medial Knee, Pressure Injury, Stage 2, Onset 6/22/21   Wound Etiology Pressure Stage  2   Dressing Status New dressing applied   Wound Cleansed Cleansed with saline   Dressing/Treatment   (mepitek)   Dressing Change Due 07/19/21   Wound Assessment Pink/red   Drainage Amount None   Drainage Description Sanguinous   Odor None   Chetna-wound Assessment Fragile   Wound 06/25/21 #75, Posterior Scrotum, Shearing, Full Thickness, Onset 6/25/21   Date First Assessed/Time First Assessed: 06/25/21 1117   Primary Wound Type: Other (comment)  Wound Description (Comments): #75, Posterior Scrotum, Shearing, Full Thickness, Onset 6/25/21   Wound Etiology Other  (shearing)   Dressing Status New dressing applied   Wound Cleansed Cleansed with saline   Dressing Change Due 07/19/21   Wound Assessment Pink/red   Drainage Amount Small   Drainage Description Serosanguinous   Odor None   Chetna-wound Assessment Fragile   Wound 02/05/21 #62, Right Buttock cluster, Pressure Injury, Stage 2, Onset 1/15/21   Date First Assessed/Time First Assessed: 02/05/21 0844   Primary Wound Type: Pressure Injury  Wound Description (Comments): #62, Right Buttock cluster, Pressure Injury, Stage 2, Onset 1/15/21   Wound Etiology Pressure Stage  2   Dressing Status New dressing applied   Wound Cleansed Cleansed with saline   Dressing/Treatment Alginate with Ag; Foam   Wound Assessment Pink/red   Drainage Amount Scant   Drainage Description Serosanguinous   Odor None   Wound 05/14/21 #71, left buttocks cluster, pressure stage 2, onset 5/14/21   Date First Assessed/Time First Assessed: 05/14/21 1106   Primary Wound Type: Pressure Injury  Wound Description (Comments): #71, left buttocks cluster, pressure stage 2, onset 5/14/21   Wound Etiology Pressure Stage  2   Dressing Status New dressing applied   Wound Cleansed Cleansed with saline   Dressing/Treatment Foam   Dressing Change Due 07/19/21   Wound Assessment Pink/red   Drainage Amount Small   Drainage Description Serosanguinous   Odor None   Wound 06/25/21 #74, Right Knee, Pressure Injury, Stage 2, Onset 6/22/21   Date First Assessed/Time First Assessed: 06/25/21 1116   Primary Wound Type: Pressure Injury  Wound Description (Comments): #74, Right Knee, Pressure Injury, Stage 2, Onset 6/22/21   Wound Etiology Pressure Stage  2   Dressing Status New dressing applied   Wound Cleansed Cleansed with saline   Dressing/Treatment   (meptiel)   Dressing Change Due 07/19/21   Wound Assessment Pink/red   Drainage Amount None   Drainage Description Sanguinous   Odor None   Chetna-wound Assessment Intact   Wound 06/25/21 #73, Right Inner Thigh, Pressure Injury, Stage 2, Onset 6/22/21   Date First Assessed/Time First Assessed: 06/25/21 1116   Primary Wound Type: Pressure Injury  Wound Description (Comments): #73, Right Inner Thigh, Pressure Injury, Stage 2, Onset 6/22/21   Wound Etiology Pressure Stage  2   Dressing Status New dressing applied   Wound Cleansed Cleansed with saline   Dressing/Treatment   (mepilex)   Dressing Change Due 07/19/21   Wound Assessment Pink/red   Drainage Amount None   Drainage Description Sanguinous   Odor None   Chetna-wound Assessment Intact   Wound 04/09/21 #67, Left Chest Wall Cluster (inframmatory),Abcess, Full Thickness, Onsret 4/7/21   Date First Assessed/Time First Assessed: 04/09/21 1045   Primary Wound Type: Other (comment)  Wound Description (Comments): #67, Left Chest Wall Cluster (inframmatory),Abcess, Full Thickness, Onsret 4/7/21   Wound Etiology Other   Dressing Status New dressing applied   Wound Cleansed Cleansed with saline   Dressing/Treatment Alginate with Ag; Foam   Dressing Change Due 07/19/21   Wound Assessment Pink/red   Drainage Amount Small   Drainage
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
## 2021-07-18 NOTE — PROGRESS NOTES
Handoff report given to LIONEL Prieto. Care transferred.      Electronically signed by Falguni Chand RN on 7/18/2021 at 7:30 AM

## 2021-07-18 NOTE — PROGRESS NOTES
Pharmacy - RE:  Low-dose Heparin drip  Current rate = 6.1 ml/h  (610 units/h)  aPTT drawn @ 0210= 107.8 sec. Goal aPTT = 60 - 90 sec. Hold heparin infusion for 1 hour, then restart at a rate of 400 units/hr (4 mL/hr) (at 0400). Will order next aPTT for 1000 today.   MATTHEW Lambert [facility]

## 2021-07-18 NOTE — PROGRESS NOTES
Pharmacy Note  RE: Heparin Drip  Current rate = 4 ml/hr    APTT = 174.2 (goal 60-90)  Decrease drip by 2.9ml/hr. Restart drip at 12:30 at 1.1ml/hr. Next APTT due @ 1830    Will continue to closely monitor and adjust as necessary.     Bradley Tamayo [facility]

## 2021-07-19 NOTE — PROGRESS NOTES
Bedside report and transfer of care given to [facility]. Pt currently resting in bed with the call light within reach. Pt denies any other care needs at this time. Pt stable at this time.     Emily Vázquez RN
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
## 2021-07-19 NOTE — PROGRESS NOTES
Shift assessment complete, see flowsheets. Medications given, see MAR. Heparin gtt is stopped, awaiting lab results and adjustment. Pt repositioned. No other needs or discomforts stated at this time. Call light in easy reach, bedside table in easy reach, and bed in lowest position.   Kota Cope RN
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
## 2021-07-19 NOTE — FLOWSHEET NOTE
07/19/21 0915   Vital Signs   Temp 98 °F (36.7 °C)   Temp Source Oral   Pulse 79   Heart Rate Source Monitor   Resp 17   /70   BP Location Left lower arm   Patient Position Semi fowlers   Level of Consciousness Alert (0)   MEWS Score 1   Patient Currently in Pain Denies   BP Upper/Lower Upper   Oxygen Therapy   SpO2 96 %   O2 Device None (Room air)   O2 Flow Rate (L/min) 0 L/min   Pt assessment complete; see flow sheets. Vitals completed. Meds given per MAR. Pt currently resting in bed with the call light within reach. Pt denies any other care needs at this time. Family at bedside. Pt stable.     Eli Aden RN

## 2021-07-19 NOTE — PROGRESS NOTES
Pt going down to [facility] at this time. Goal to go to MRI after procedure. Pt aware. Pt stable.     Carline Sandoval RN

## 2021-07-19 NOTE — PROGRESS NOTES
Comprehensive Nutrition Assessment    Type and Reason for Visit:  Reassess    Nutrition Recommendations/Plan:   1. Continue NPO status until medically cleared to receive nutrition therapy- monitor diet advancement  2. Once diet is advanced, monitor appetite, meal intake, and need for ONS. 3. Monitor renal function, TDC procedure today, and POC. 4. Monitor nutrition-related labs, bowel function, and weight trends. Nutrition Assessment:  patient was continuing to improve from a nutritional standpoint AEB consuming % of meals, diet consistency was advanced to Regular solids on 7/14, and no weight loss since previous assessment, however, he remains at risk for further compromise d/t NPO status today (7/19) for a procedure, nutrition losses via HD (TTS), altered nutrition-related labs, pitting edema, multiple wounds, and weight loss during admission; Will continue NPO status and monitor diet advancement    Malnutrition Assessment:  Malnutrition Status: At risk for malnutrition (Comment)    Context:  Acute Illness     Findings of the 6 clinical characteristics of malnutrition:  Energy Intake:  Mild decrease in energy intake (Comment) (NPO status for procedure today (7/19))  Weight Loss:  No significant weight loss (3.5% or -10.1# x 13 days admission; likely d/t fluid losses)     Body Fat Loss:  No significant body fat loss     Muscle Mass Loss:  1 - Mild muscle mass loss Hand (interosseous)  Fluid Accumulation:  1 - Mild Extremities, Generalized (+1 BUE pitting; LLE +2 pitting; generalized)   Strength:  Not Performed    Estimated Daily Nutrient Needs:  Energy (kcal):  1375 - 1750 kcals based on 11-14 kcals/kg/CBW;  Weight Used for Energy Requirements:  Current     Protein (g):  150 - 165 g protein based on 2.0-2.2 g/kg/adjusted IBW; Weight Used for Protein Requirements:  Adjusted        Fluid (ml/day):  1375 - 1750 ml; Method Used for Fluid Requirements:  1 ml/kcal      Nutrition Related Findings: patient is A & O x 4; noted pt to have tunneled dialysis catheter placed today; noted CRRT stopped on 7/14, HD started on 7/15, TTS schedule; Abdomen rounded, soft, colostomy and JESUSITA drain in place; BS active, 600 mL stool output via colostomy today 7/19; +1 BUE pitting edema, +2 LLE pitting edema, generalized edema; noted skin is warm, swolle, jaundice; noted on 7/14, SLP upgraded pt diet consistency to regular solids and thin liquids;  Na, and H/H are low; BUN, Creat, BS, Bilirubin, an Alkaline Phosphatase are elevated; pt is receiving continuous heparin in D5; pt was consuming % of meals prior to being NPO status today for procedure      Wounds:  Multiple, Stage II, Full Thickness (Stage 2 right medial knee, Stage 2 right inner thigh, Stage 2 right buttocks, Stage 2 left buttocks, full thickness posterior scrotum, full thickness abscess left chest wall, cluster)       Current Nutrition Therapies:    Diet NPO    Anthropometric Measures:  · Height: 6' 2\" (188 cm)  · Current Body Weight: 274 lb 4.8 oz (124.4 kg) (obained 7/19/21; actual)   · Admission Body Weight: 284 lb 6.3 oz (129 kg) (obtained on 7/7/21; actual weight)    · Usual Body Weight: 284 lb 6.3 oz (129 kg) (obtained on 7/7/21; actual weight)     · Ideal Body Weight: 190 lbs; % Ideal Body Weight 144.4 %   · BMI: 35.2  · Adjusted Body Weight: 311.1; Paraplegia   · Adjusted BMI: 39.9    · BMI Categories: Obese Class 2 (BMI 35.0 -39.9)       Nutrition Diagnosis:   · Altered nutrition-related lab values related to renal dysfunction, endocrine dysfuntion, increase demand for energy/nutrients as evidenced by lab values, dialysis, wounds    Nutrition Interventions:   Food and/or Nutrient Delivery:  Continue NPO  Nutrition Education/Counseling:  No recommendation at this time   Coordination of Nutrition Care:  Continue to monitor while inpatient    Goals:  patient will adhere to NPO status until medically cleared to receive nutrition therapy       Nutrition Monitoring and Evaluation:   Behavioral-Environmental Outcomes:  None Identified   Food/Nutrient Intake Outcomes:  Diet Advancement/Tolerance  Physical Signs/Symptoms Outcomes:  Biochemical Data, Chewing or Swallowing, Fluid Status or Edema, Nutrition Focused Physical Findings, Skin, Weight     Discharge Planning:     Too soon to determine     Electronically signed by Akhil Soria RD, LD on 7/19/21 at 12:03 PM EDT    Contact: [phone]

## 2021-07-19 NOTE — PLAN OF CARE
Nutrition Problem #1: Altered nutrition-related lab values  Intervention: Food and/or Nutrient Delivery: Continue NPO  Nutritional Goals: patient will adhere to NPO status until medically cleared to receive nutrition therapy

## 2021-07-19 NOTE — PROGRESS NOTES
Pharmacy - RE:  Low-dose Heparin drip  Current rate = 1.1 ml/h  (110 units/h)  aPTT drawn 7/19 @ 0407 = 72.9 sec. Goal aPTT = 60 - 90 sec. Per protocol, continue heparin at current rate of 110 units/hr. Heparin infusion is scheduled to be held this am at 0700 per MD order. Patient has a procedure scheduled. Please notify pharmacy when/if heparin will be resumed.   Zaynab Rhodes [facility]

## 2021-07-19 NOTE — PROGRESS NOTES
Nephrology Progress Note   [facility]. [facility]      This patient is a 50year old male whom we are following for SUDHA, on CKRT. Subjective:  CRRT was stopped on 7/14  HD on 7/15 with 1.2 L UF over 3.5 hours, 25% of albumin 25 g x 2 given  HD on 7/17 with 528 ml net UF, complicated by hypotension needing midodrine and IV albumin 25 g x 2    Urine output in last 24 hours is 210 mL  Diarrhea     ROS: No fever or chills. Social: family at bedside. Vitals:  /70   Pulse 79   Temp 98 °F (36.7 °C) (Oral)   Resp 17   Ht 6' 2\" (1.88 m)   Wt 274 lb 4.8 oz (124.4 kg)   SpO2 96%   BMI 35.22 kg/m²   I/O last 3 completed shifts: In: 80 [P.O.:780]  Out: 56 [Urine:400; Drains:210]  I/O this shift:  In: -   Out: 850 [Urine:100; Drains:150; Stool:600]    Physical Exam:  General appearance: Seems comfortable, no acute distress. Neck: Trachea midline, thyroid normal.   Lungs:  Non labored breathing, CTA to anterior auscultation. Heart:  S1S2 normal, rub or gallop. + peripheral edema. Abdomen: Soft, non-tender, no organomegaly. Skin: No lesions or rashes, warm to touch.    Access: LIJ temp HD catheter      Medications:   insulin glargine  15 Units Subcutaneous Nightly    meropenem  500 mg Intravenous Q24H    midodrine  10 mg Oral TID WC    insulin lispro  0-18 Units Subcutaneous Q4H    aspirin  81 mg Oral Nightly    budesonide-formoterol  2 puff Inhalation BID    pantoprazole  40 mg Intravenous Daily    sodium chloride flush  5-40 mL Intravenous 2 times per day     Labs:  Recent Labs     07/17/21  0500 07/18/21  0540 07/19/21  0545   WBC 14.5* 11.7* 10.8   HGB 10.6* 10.1* 9.8*   HCT 34.3* 32.6* 31.3*   MCV 78.8* 79.5* 80.4   PLT 72* 71* 98*     Recent Labs     07/17/21  0500 07/18/21  0540 07/19/21  0407   * 131* 130*   K 3.9 4.2 4.1   CL 98* 101 100   CO2 20* 19* 18*   GLUCOSE 79 99 149*   PHOS 4.2 3.0 4.1   MG 1.60* 1.60* 1.90   BUN 29* 18 24*   CREATININE 2.0* 1.5* 1.8*   LABGLOM 35* 49* 40* GFRAA 42* 61* 48*         Assessment/Plan:  Acute Kidney Injury:    - Etiology:  ATN / Septic shock  - CRRT from 7/6/2020 till 7/14/21 1. Access LIJ temp HD catheter.  -iHD initiated 7/15   -will continue on Tuesday Thursday Saturday schedule or match outpatient schedule   - tunneled dialysis on Monday   -Outpatient HD unit arrangement  -midodrine 10 mg TID for BP  -Monitor signs of renal recovery     Septic Shock with MOSF.  - Etiology: Cholangitis   - Clinical:  GI consulted, on pressors and broad spectrum antibiotics. - Incomplete ERCP, failed cannulation on 7/6/21.  - Leukocytosis improving/stable.     Metabolic Acidosis. - From lactic acidosis and SUDHA.   -  resolved     Respiratory Failure: Nino Cantu

## 2021-07-19 NOTE — FLOWSHEET NOTE
07/19/21 1400   Vital Signs   Temp 98.3 °F (36.8 °C)   Temp Source Oral   Pulse 84   Heart Rate Source Monitor   Resp 17   /63   BP Location Left lower arm   Patient Position Semi fowlers   Level of Consciousness Alert (0)   MEWS Score 1   Patient Currently in Pain Denies   Oxygen Therapy   SpO2 98 %   O2 Device None (Room air)   O2 Flow Rate (L/min) 0 L/min       Pt returned from [place] Cath placement at this time. Pt stable.     Albino Leslie RN

## 2021-07-19 NOTE — PROGRESS NOTES
Dressing change completed at this time. Hep gtt resumed. Pharmacy called. Pt able to order food. Family at bedside. Pt stable.     Samira De Leon RN
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
## 2021-07-19 NOTE — PROGRESS NOTES
Speech Language Pathology  ST attempting follow-up. Pt is currently NPO for a procedure this date. ST to cont to follow as schedule permits. Thank you.    Swathi Yanes M.A, CCC-SLP/ CBIS   Acute Speech Therapy: [phone]

## 2021-07-20 NOTE — FLOWSHEET NOTE
07/20/21 0745   Vital Signs   Temp 98.5 °F (36.9 °C)   Temp Source Oral   Pulse 109   Heart Rate Source Monitor   Resp 18   BP (!) 148/72   BP Location Left upper arm   Patient Position Semi fowlers   Level of Consciousness Alert (0)   MEWS Score 2   Patient Currently in Pain Denies   BP Upper/Lower Upper   Oxygen Therapy   SpO2 92 %   O2 Device None (Room air)   O2 Flow Rate (L/min) 0 L/min     Pt assessment complete; see flow sheets. Vitals completed. Pt NPO for ERCP. Meds held at this time. Pt currently resting in bed with the call light within reach. Pt denies any other care needs at this time. Pt stable.     Samira De Leon RN

## 2021-07-20 NOTE — PROGRESS NOTES
Nephrology Progress Note   [facility]. [facility]      This patient is a 50year old male whom we are following for SUDHA, on CKRT. Subjective:  CRRT was stopped on 7/14  HD on 7/15 with 1.2 L UF over 3.5 hours, 25% of albumin 25 g x 2 given  HD on 7/17 with 849 ml net UF, complicated by hypotension needing midodrine and IV albumin 25 g x 2    HD on 7/20    Urine output in last 24 hours is 285 mL  Diarrhea     ROS: No fever or chills. Social: family at bedside. Vitals:  BP (!) 148/72   Pulse 109   Temp 98.5 °F (36.9 °C) (Oral)   Resp 18   Ht 6' 2\" (1.88 m)   Wt 274 lb 4.8 oz (124.4 kg)   SpO2 92%   BMI 35.22 kg/m²   I/O last 3 completed shifts: In: 80 [P.O.:90]  Out: 2385 [Urine:900; Drains:285; Stool:5]  I/O this shift:  In: 12 [P.O.:240]  Out: 76 [Drains:75]    Physical Exam:  General appearance: Seems comfortable, no acute distress. Neck: Trachea midline, thyroid normal.   Lungs:  Non labored breathing, CTA to anterior auscultation. Heart:  S1S2 normal, rub or gallop. + peripheral edema. Abdomen: Soft, non-tender, no organomegaly. Skin: No lesions or rashes, warm to touch.    Access: LIJ temp HD catheter      Medications:   insulin glargine  15 Units Subcutaneous Nightly    meropenem  500 mg Intravenous Q24H    midodrine  10 mg Oral TID     insulin lispro  0-18 Units Subcutaneous Q4H    aspirin  81 mg Oral Nightly    budesonide-formoterol  2 puff Inhalation BID    pantoprazole  40 mg Intravenous Daily    sodium chloride flush  5-40 mL Intravenous 2 times per day     Labs:  Recent Labs     07/18/21  0540 07/19/21  0545 07/20/21  0625   WBC 11.7* 10.8 12.3*   HGB 10.1* 9.8* 9.9*   HCT 32.6* 31.3* 32.4*   MCV 79.5* 80.4 80.3   PLT 71* 98* 125*     Recent Labs     07/18/21  0540 07/19/21  0407 07/20/21  0427   * 130* 127*   K 4.2 4.1 4.1    100 99   CO2 19* 18* 17*   GLUCOSE 99 149* 196*   PHOS 3.0 4.1 5.7*   MG 1.60* 1.90 1.70*   BUN 18 24* 32*   CREATININE 1.5* 1.8* 2.2* LABGLOM 49* 40* 31*   GFRAA 59* 48* 38*         Assessment/Plan:  Acute Kidney Injury:    - Etiology:  ATN / Septic shock  - CRRT from 7/6/2020 till 7/14/21 1. Access LIJ temp HD catheter.  -iHD initiated 7/15   -will continue on Tuesday Thursday Saturday schedule or match outpatient schedule   -Status post left IJ tunneled dialysis on 7/19  -Outpatient HD unit arrangement  -midodrine 10 mg TID for BP  -Monitor signs of renal recovery     Septic Shock with MOSF.  - Etiology: Cholangitis   - Clinical:  GI consulted, on pressors and broad spectrum antibiotics. - Incomplete ERCP, failed cannulation on 7/6/21.  - Leukocytosis improving/stable.     Metabolic Acidosis. - From lactic acidosis and SUDHA.   -  resolved     Respiratory Failure: Flor Beck

## 2021-07-20 NOTE — PROGRESS NOTES
Low dose Heparin GTT:  Patient currently in dialysis. APTT drawn there is 37.4 seconds. Infusion running at 110units/hr. Will forego IV bolus dose and increase the infusion to 150units/hr when patient returns to the floor. Krystal Aguilar Next aPTT 0100 7/21/21.   David Ward PharmD 7/20/2021 6:57 PM

## 2021-07-20 NOTE — PROGRESS NOTES
Pharmacy - RE:  Low-dose Heparin drip  Current rate = 1.1 ml/h  (110 units/h)  aPTT drawn 7/19 @ 2122 = 85.1 sec. Goal aPTT = 60 - 90 sec. Per protocol, continue heparin at current rate of 110 units/hr (1.1 mL/hr).   Next aPTT ordered for 7/20 @ [address] [facility]
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
## 2021-07-20 NOTE — PROGRESS NOTES
Colostomy bag changed at this time. Heel wound dressed. Consent for ERCP obtained at this time.   Mony Gregg RN

## 2021-07-20 NOTE — PROGRESS NOTES
Bedside report and transfer of care given to [facility]. Pt currently resting in bed with the call light within reach. Pt denies any other care needs at this time. Pt stable at this time.     Ulises Beasley RN

## 2021-07-20 NOTE — PROGRESS NOTES
Shift assessment complete, see flowsheets. Medications given, see MAR. APTT lab drawn. Heparin gtt currently paused awaiting lab results. Pt repositioned. No other needs or discomforts stated at this time. Call light in easy reach, bedside table in easy reach, and bed in lowest position.   Lori Oppenheim, RN

## 2021-07-20 NOTE — PROGRESS NOTES
Pharmacy - RE:  Low-dose Heparin drip  Current rate = 1.1 ml/h  (110 units/h)  aPTT drawn 7/20 @ 0427 = 70.6 sec. Goal aPTT = 60 - 90 sec. Per protocol, continue heparin at current rate of 110 units/hr (1.1 mL/hr). Heparin infusion turned off of 0730 for procedure. Nursing to notify pharmacy when heparin is to be restarted. Jasmin Jew, [address] [place] [address]      Jasmin [facility], [address] [place] [address]

## 2021-07-20 NOTE — PROGRESS NOTES
Hep gtt restarted at this time per order from Dr. Karlos Ernst. Pharmacy notified.      Praveen Healy RN

## 2021-07-21 NOTE — OP NOTE
monitored  throughout the procedure. The patient underwent endotracheal intubation  for airway protection. The patient was then placed in left lateral  decubitus position. Once adequately sedated and positioned, a standard  therapeutic duodenoscope was inserted in the mouth and advanced under  direct visualization to second portion of the duodenum. The entire  mucosa of the esophagus, stomach, and duodenum were examined carefully. The patient tolerated the procedure well without any difficulties. FINDINGS:  ESOPHAGUS:  The examined esophagus appeared normal on limited views. STOMACH:  Limited view of the stomach appeared normal.  DUODENUM:  There was a large periampullary diverticulum. Ampulla was  within the diverticulum. Deep wire-guided cannulation was successfully  achieved using TRUEtome sphincterotome loaded with 0.025 Revolution  guidewire. Contrast was injected. Images were interpreted by me. The  first common bile duct cannulation was confirmed by aspirating bile. The entire common duct and primary intrahepatic branches as well as  cystic duct opacified with contrast.  There was a small, 5-mm distal  filling defect. At this time, decision was made to proceed with a 1 cm  sphincterotomy successfully. Then, the sphincterotome was exchanged in  favor of 9-12 mm balloon extraction catheter. After initial sweeps with  9-mm, 12-mm balloon sweeps were then performed with disappearance of the  filling defect consistent with stone extraction. However, the stone was  not observed extracted. After several sweeps, a final occlusion  cholangiogram was performed, which showed a normal-caliber CBD measuring  6 mm with no further filling defects or contrast extravasation. The  patient tolerated the procedure well without any difficulties. SUMMARY:  1.  A 5-mm distal CBD stone. 2.  Periampullary diverticulum.   3.  Normal-caliber CBD measuring 6-7 mm.  4.  Final occlusion cholangiogram without any further filling defects or  contrast extravasation. RECOMMENDATIONS:  1. Return the patient to floor. 2.  Monitor LFTs. 3.  Okay to remove cholecystotomy tube. 4.  Surgery consult for elective cholecystectomy once acute symptoms  improve. 5.  Resume diet and advance as tolerated. 6.  PT/OT. 7.  We will follow the patient with you. EBL: <5mL    Reynold Schneider MD    D: 07/21/2021 12:33:58       T: 07/21/2021 12:41:44     [id number]  Job#: [id number]     Doc#: [id number]    CC:   MD Simi Diaz MD

## 2021-07-21 NOTE — PROGRESS NOTES
Patient resting and awakens to verbal stimuli. No c/o  Waiting for transport to take patient to room.
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
## 2021-07-21 NOTE — PROGRESS NOTES
Shift assessment complete, see flowsheets. Medications given, see MAR. JESUSITA drain emptied and intact. Pt reporting R elbow pain. Positioned for comfort and PRN provided. Pt educated on NPO status at midnight. BP @2034 was 86/56, since MAP is 66 and pt remains asymptomatic no intervention at this time, per Dr. Merlin Coop. No other needs or discomforts stated at this time. Call light in easy reach, bedside table in easy reach, and bed in lowest position.   Priti Bravo RN

## 2021-07-21 NOTE — PROGRESS NOTES
Speech Language Pathology  SLP Attempt Note        Name: Rachna Tristan  : 1965  Medical [facility]: [facility] [A41.9, R65.21]    Pt RENITA floor procedure. NPO for procedure. No charges filed.       Shira Luu M.A., [address] [facility]  Speech-Language Pathologist  Phone: [phone]

## 2021-07-21 NOTE — BRIEF OP NOTE
(Removed)   Status Clamped 07/06/21 1200   Placement Verified by X-Ray (Initial) 07/06/21 1200   Residual Volume (ml) 0 ml 07/06/21 1200       [REMOVED] NG/OG/NJ/NE Tube Orogastric 16 fr Center mouth (Removed)   Surrounding Skin Dry; Intact 07/07/21 2000   Securement device Yes 07/07/21 2000   Status Suction-low continuous 07/09/21 0600   Placement Verified by Gastric Contents 07/08/21 2200   NG/OG/NJ/NE External Measurement (cm) 68 cm 07/07/21 2000   Drainage Appearance Green 07/08/21 1100   Tube Feeding Supplement Amount (mL) 0 07/09/21 0600   Tube Feeding Intake (mL) 0 ml 07/09/21 0600   Free Water Flush (mL) 0 mL 07/09/21 0600   Free Water Rate 0 07/09/21 0600   Residual Volume (ml) 0 ml 07/09/21 0600   Output (mL) 0 ml 07/09/21 0600       [REMOVED] Urethral Catheter (Removed)       [REMOVED] External Urinary Catheter (Removed)   Output (mL) 250 mL 07/06/21 0809       Findings: 5mm filling defect extracted after sphincterotomy and balloon sweeps. Final cholangiogram with normal CBD    Recommendation  1. Continue supportive care  2. Monitor LFTs  3. OK to d/c cholecystotomy tube  4. Surgery for interval cholecystectomy  5. Resume low fat diet  6.  PT/OT    Electronically signed by Barbra Goldberg MD on 7/21/2021 at 12:34 PM

## 2021-07-21 NOTE — PROGRESS NOTES
Pharmacy - RE:  Low-dose Heparin drip  Current rate = 1.5 mL/hr  (150 units/hr)  aPTT drawn 7/21 @ 0045 = 117.7 sec. Goal aPTT = 60 - 90 sec. Nursing notified to hold heparin infusion for 1 hour, then restart infusion at a rate of 1.1 mL/hr (110 units/hr). Next aPTT ordered for 0900.   MATTHEW Roldan [facility]

## 2021-07-21 NOTE — FLOWSHEET NOTE
07/21/21 1330   Vital Signs   Temp 96.7 °F (35.9 °C)   Temp Source Oral   Pulse 86   Heart Rate Source Monitor   Resp 18   BP 97/60   Patient Position Semi fowlers   Level of Consciousness Alert (0)   MEWS Score 2   Patient Currently in Pain Denies   BP Upper/Lower Upper   Oxygen Therapy   SpO2 98 %   O2 Device None (Room air)   O2 Flow Rate (L/min) 0 L/min     Pt returned from endo at this time. Pt O/Ax4. Family at bedside. Goal for case management to come and see pt.     Mayco Farias RN

## 2021-07-21 NOTE — ANESTHESIA POSTPROCEDURE EVALUATION
Department of Anesthesiology  Postprocedure Note    Patient: Clark Villarreal  MRN: [id number]  YOB: 1965  Date of evaluation: 7/21/2021  Time:  1:27 PM     Procedure Summary     Date: 07/21/21 Room / Location: [address] [place] [address] / [facility]    Anesthesia Start: 1114 Anesthesia Stop: 1224    Procedures:       ERCP SPHINCTER/PAPILLOTOMY (N/A )      ERCP STONE REMOVAL Diagnosis:       Choledocholithiasis      (CHOLEDOCHOLITHIASIS)    Surgeons: Frances Murdock MD Responsible Provider: Pattricia Kayser, MD    Anesthesia Type: General ASA Status: 4          Anesthesia Type: General    Cleve Phase I: Cleve Score: 9    Cleve Phase II:      Last vitals: Reviewed and per EMR flowsheets. Anesthesia Post Evaluation    Patient location during evaluation: PACU  Patient participation: complete - patient participated  Level of consciousness: awake and alert  Airway patency: patent  Nausea & Vomiting: no nausea and no vomiting  Complications: no  Cardiovascular status: blood pressure returned to baseline  Respiratory status: acceptable  Hydration status: euvolemic  Comments: VSS on transfer to phase 2 recovery. No anesthetic complications.

## 2021-07-21 NOTE — PROGRESS NOTES
Heparin gtt stopped. Resume @0230 at 1.1mL/hr, per [facility].  Jeaneth Mills RN

## 2021-07-21 NOTE — PROGRESS NOTES
Pt positioned on table in a neutral position laying on left side. Head in line with spine. Arms and shoulders padded to help align. Pillow placed between legs. Drain remains in to right side, ostomy in place on left abdomen, and port intact and infusing.
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
## 2021-07-21 NOTE — CONSULTS
7-21-21 (0527) aptt 65.4 sec. Please maintain heparin drip at 110 units per hour ( 1.1 ml/hr ). Please recheck aptt at 1200 on 7-21-21. [facility]. .  7/21/2021 8:56 AM
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
## 2021-07-21 NOTE — PROGRESS NOTES
Nephrology Progress Note   [facility]. [facility]      This patient is a 50year old male whom we are following for SUDHA, on CKRT. Subjective:  CRRT was stopped on 7/14  HD on 7/15 with 1.2 L UF over 3.5 hours, 25% of albumin 25 g x 2 given  HD on 7/17 with 582 ml net UF, complicated by hypotension needing midodrine and IV albumin 25 g x 2    HD on 7/20 with 1.1 L net UF, post weight 128.4 kg, patient received midodrine    Urine output in last 24 hours is 575 mL  Diarrhea     ROS: No fever or chills. Social: family at bedside. Vitals:  BP (!) 95/59   Pulse 97   Temp 98 °F (36.7 °C) (Temporal)   Resp 18   Ht 6' 2\" (1.88 m)   Wt 282 lb 10 oz (128.2 kg)   SpO2 95%   BMI 36.29 kg/m²   I/O last 3 completed shifts: In: 1513.5 [P.O.:420; I.V.:93.5; IV Piggyback:100]  Out: 5665 [Urine:300; Drains:575; Stool:200]  I/O this shift:  In: 600 [I.V.:600]  Out: 475 [Urine:75; Drains:200; Stool:200]    Physical Exam:  General appearance: Seems comfortable, no acute distress. Neck: Trachea midline, thyroid normal.   Lungs:  Non labored breathing, CTA to anterior auscultation. Heart:  S1S2 normal, rub or gallop. + peripheral edema. Abdomen: Soft, non-tender, no organomegaly. Skin: No lesions or rashes, warm to touch.    Access: LIJ temp HD catheter      Medications:   famotidine (PEPCID) injection  20 mg Intravenous Once    insulin glargine  15 Units Subcutaneous Nightly    meropenem  500 mg Intravenous Q24H    midodrine  10 mg Oral TID WC    insulin lispro  0-18 Units Subcutaneous Q4H    aspirin  81 mg Oral Nightly    budesonide-formoterol  2 puff Inhalation BID    pantoprazole  40 mg Intravenous Daily    sodium chloride flush  5-40 mL Intravenous 2 times per day     Labs:  Recent Labs     07/19/21  0545 07/20/21  0625 07/21/21  0527   WBC 10.8 12.3* 9.2   HGB 9.8* 9.9* 9.8*   HCT 31.3* 32.4* 31.7*   MCV 80.4 80.3 81.2   PLT 98* 125* 88*     Recent Labs     07/19/21  0407 07/19/21  0407 07/20/21  0427 07/21/21  0527   *  --  127* 127*   K 4.1   < > 4.1 4.0  4.0     --  99 99   CO2 18*  --  17* 20*   GLUCOSE 149*  --  196* 168*   PHOS 4.1  --  5.7* 3.9   MG 1.90  --  1.70* 1.70*   BUN 24*  --  32* 21*   CREATININE 1.8*  --  2.2* 1.6*   LABGLOM 40*  --  31* 45*   GFRAA 48*  --  38* 55*    < > = values in this interval not displayed. Assessment/Plan:  Acute Kidney Injury:    - Etiology:  ATN / Septic shock  - CRRT from 7/6/2020 till 7/14/21 1. Access LIJ temp HD catheter.  -iHD initiated 7/15   -will continue on Tuesday Thursday Saturday schedule, currently has placed accepted at [facility] at 11:30 AM   -Status post left IJ tunneled dialysis on 7/19  -Outpatient HD unit arrangement  -midodrine 10 mg TID for BP  -Monitor signs of renal recovery     Septic Shock with MOSF.  - Etiology: Cholangitis   - Clinical:  GI consulted, on pressors and broad spectrum antibiotics. - Incomplete ERCP, failed cannulation on 7/6/21. ERCP again on 7/21  - Leukocytosis improving/stable.     Metabolic Acidosis. - From lactic acidosis and SUDHA.   -  resolved     Respiratory Failure: Fidencio Vazquez

## 2021-07-21 NOTE — CARE COORDINATION
INTERDISCIPLINARY PLAN OF CARE CONFERENCE    Date/Time: 7/21/2021 9:44 AM  Completed by: Luly Lyons RN, Case Management      Patient Name:  Jamel Ayers  YOB: 1965  Admitting Diagnosis: Septic shock ([address] [A41.9, R65.21]     Admit Date/Time:  7/6/2021 12:28 AM    Chart reviewed. Interdisciplinary team contacted or reviewed plan related to patient progress and discharge plans. Disciplines included Case Management, Nursing, and Dietitian. Current Status:ongoing  PT/OT recommendation for discharge plan of care: await eval --CM spoke with Marylouted with PT    Expected D/C Disposition:  Home vs SNF  Confirmed plan with patient and/or family Yes confirmed with: (name) pt and LM for wife Rachell  Met with:pt and LM for wife Rachell  Discharge Plan Comments: Reviewed chart. Role of discharge planner explained and patient verbalized understanding. Pt is alert and oriented and makes his own decisions. Pt is from home with son and daughter. Pt has a HHA M-F 0900-1500pm (and sometimes cannot come) and [facility] with RN only. Pt has wound Care once a week. Rachell and pt are , but she lives 5 minutes away from him. However, she helps take care of him, but works long hours. Rachell states that she and pt have a great relationship. Pt will need ambulance transport home. Pt and wife were wanting [employer] [employer] transport to be tried first, but states that they will use any transport company. Rachell (wife) states she is having a family meeting today at 17noon today with pt. CM offered to be present for this meeting in if she would like. Rachell agreed. They are discussing a SNF verses home. Older son is moving out. Daughter Jarret Greene) is starting college. Rachell is aware that pt is about ready for discharge. CM recommended for Rachell to be present when PT/OT evaluates pt. Rachell verbalized understanding. Rachell is aware that pt is already scheduled for [facility] on TTS at 1130.   Pt will need transport set up. Addendum 1130: Pt met with Rachell, 2 sons, and daughter while pt was in a procedure, to discuss their plans for pt. Pt was agreeable and agreeable to PT/OT, as well, when he cam back from his procedure. They discussed wanting pt to come home and have [address] with PT/OT/SW/SN. They state pt has a HHA through Waiver, and is eligible for more hours, but cannot get people (HHA) to come in. Pt has transport to wound care weekly with [facility] ([phone]). CM called [facility] to set up HD for pt on TTS at 1130 time slot (told them to 1115) at [facility], and [facility] states that CM will need to call [facility], as they will set up the transport for pt. Addendum [phone]: met with pt and family for family meeting. Pt states that he refuses SNF. Pt states that he wants to go home with [facility] of his Passport for HHA and [place] of SN, but to add PT/OT/SW. Pt states that his mother will be coming to help him,as well. Rachell agreed that this was a good plan,. Pt states that he has friends that are Aids and are valid and licensed through [place], and is going to ask if they would help out through his Waiver program.   Rachell and family state that they feel comfortable with this plan, as well. Pt will need ambulance transport home and prefers [facility]. Will need to set up through his [facility]. Pt gave CM [facility] number of [phone]. CM spoke with Vanessa at [facility]. She set up pt for his HD for Saturday (7/24/2021) and will arrive at 1030am  time to transport pt to [facility] for his 1130am HD. HD is taking 4 hours. [address] has set up a  time for 1545 to  time from [facility]. [address] number: [phone]. Madelin Velez has repeated this as a reoccurring order as well for the days of Tues, and Thurs for the same times. To go to [facility] and has scheduled this with [facility].        Tyrone Hines states for transportation to pt's home for day of discharge, CM will have to call day of [phone]. Pt and family also state that they have left several messages for the  for Waiver Program Leidy Zaragoza C#: [phone], office: [phone], fax#: [phone]). CM called and LM for Jazlyn via her cell number. CM also called the office number ([phone]), and her VM referred CM to do so. CRUZITO was transferred to [facility] with [facility].     CM left the VM for Radha Ribeiro asking for HHA to fill the increased hours that pt has available at home to help him out at home. --Awaiting call back. Per [facility], they have been constantly sending out referrals for trying to get people to come to pt for hours that are needed. They have sent the referral every day, and called 3 agencies, as well, to find HHA to cover these shifts. She asks for the KISHAN/AVS to be faxed to [phone]. Candy states that she will message Radha Ribeiro, as well. Pt states he wants [facility]. and that he is active with them for [facility].. He states he would like SN, but to add PT/OT/SW. CRUZITO called [place] with [facility]. ([phone]) and LM to inform her that pt to d/c possibly tomorrow,as she is active with pt for SN, but adding PT/OT/SW. +eCOC, +HHC orders.        Home O2 in place on admit: No  Pt informed of need to bring portable home O2 tank on day of discharge for nursing to connect prior to leaving:  Not Indicated  Verbalized agreement/Understanding:  Not Indicated
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
## 2021-07-22 NOTE — PROGRESS NOTES
Shift assessment completed. See flow sheet. Medications given. Patient is A&O x4. Call light within reach. Will continue to monitor.

## 2021-07-22 NOTE — PLAN OF CARE
Problem: Falls - Risk of:  Goal: Will remain free from falls  Description: Will remain free from falls  7/21/2021 2229 by Rianna Khanna RN  Outcome: Ongoing  7/21/2021 2228 by Rianna Khanna RN  Outcome: Ongoing  Goal: Absence of physical injury  Description: Absence of physical injury  7/21/2021 2229 by Rianna Khanna RN  Outcome: Ongoing  7/21/2021 2228 by Rianna Khanna RN  Outcome: Ongoing     Problem: Skin Integrity:  Goal: Will show no infection signs and symptoms  Description: Will show no infection signs and symptoms  7/21/2021 2229 by Rianna Khanna RN  Outcome: Ongoing  7/21/2021 2228 by Rianna Khanna RN  Outcome: Ongoing  Goal: Absence of new skin breakdown  Description: Absence of new skin breakdown  7/21/2021 2229 by Rianna Khanna RN  Outcome: Ongoing  7/21/2021 2228 by Rianna Khanna RN  Outcome: Ongoing     Problem: Nutrition  Goal: Optimal nutrition therapy  7/21/2021 2229 by Rianna Khanna RN  Outcome: Ongoing  7/21/2021 2228 by Rianna Khanna RN  Outcome: Ongoing  Goal: Understanding of nutritional guidelines  7/21/2021 2229 by Rianna Khanna RN  Outcome: Ongoing  7/21/2021 2228 by Rianna Khanna RN  Outcome: Ongoing     Problem: SAFETY  Goal: Free from accidental physical injury  Outcome: Ongoing  Goal: Free from intentional harm  Outcome: Ongoing     Problem: DAILY CARE  Goal: Daily care needs are met  Outcome: Ongoing     Problem: PAIN  Goal: Patient's pain/discomfort is manageable  Outcome: Ongoing     Problem: SKIN INTEGRITY  Goal: Skin integrity is maintained or improved  Outcome: Ongoing     Problem: KNOWLEDGE DEFICIT  Goal: Patient/S.O. demonstrates understanding of disease process, treatment plan, medications, and discharge instructions.   Outcome: Ongoing     Problem: DISCHARGE BARRIERS  Goal: Patient's continuum of care needs are met  Outcome: Ongoing
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
## 2021-07-22 NOTE — FLOWSHEET NOTE
07/22/21 1214 07/22/21 1546   Vital Signs   BP (!) 114/53 (!) 131/58   Temp 98.1 °F (36.7 °C) 98.2 °F (36.8 °C)   Pulse 102 98   SpO2 94 % 95 %   Weight 289 lb 8 oz (131.3 kg) 289 lb 8 oz (131.3 kg)     Treatment time: 180min     Net UF: None    Pre weight: 131.3k  Post weight: 131.3k  EDW: TBD    Access used: LIJ TDC  Access function: Function well    Medications or blood products given: None    Regular outpatient schedule: TTS     Summary of response to treatment: Tolerated well. Tx time reduced today due to discharge transport set up for 1600. Dr Ham Traore aware of this. Copy of dialysis treatment record placed in chart, to be scanned into EMR.

## 2021-07-22 NOTE — PROGRESS NOTES
Bedside report and transfer of care given to [facility], RN. Pt currently resting in bed with the call light within reach. Pt denies any other care needs at this time. Pt stable at this time.     Dwain Sibley RN
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
## 2021-07-22 NOTE — CARE COORDINATION
DISCHARGE ORDER  Date/Time 2021 9:13 AM  Completed by: Mateo Lowe RN, Case Management    Patient Name: Mary Lou Hinkle      : 1965  Admitting Diagnosis: Septic shock ([address] [A41.9, R65.21]      Admit order Date and Status:2021  (verify MD's last order for status of admission)      Noted discharge order. If applicable PT/OT recommendation at Discharge: home with 24 hour assist with home PT  DME recommendation by PT/OT:n/a  Confirmed discharge plan  (pt): Yes  with whom______________pt_  If pt confirmed DC plan does family need to be contacted by CM No if yes who____n/a__  Discharge Plan: Reviewed chart. Role of discharge planner explained and patient verbalized understanding. Pt is alert and oriented. Pt stated that CM could speak in front of his kids. Kids verbalized understanding. Discharge order is noted. Pt is being d/c'd to home today. CM confirmed pt's address for [address]. Pt's O2 sats are 97% on RA. Pt states that he will need ambulance transport home today, and prefers [facility]. Pt states he wants [facility] with [facility], +HHC orders, +eCOC for SN and add /PT/OT/SW. CM LM for [place] with [facility] regarding pt being d/c today (as suspected and informed yesterday via ) and that CM faxed KISHAN/AVS/HHC orders to [phone]. Pt is aware that  has ambulance transport for pt [address] spoke with Nisha Yen ([phone]), pt's CM with [employer]. Emma Carias states that they have been trying to find extra HHA, but no one is willing to work the extra hours he need to be home. CRUZITO explained pt's plan at home with verbalized understanding. Emma Carias is aware that pt will d/c to home today, but is new to  TTS and the times. CRUZITO faxed KISHAN/AVS to Jenna Worthy to fax #: [phone]. Emma Carias did confirm that pt's daughter, Adonis Yen, is a minor, and she is not considered a caregiver of pt d/t being a minor.      CRUZITO called [facility] ([phone]) and spoke with Jareth Singer (charge RN). CM faxed KISHAN/AVS/neph notes/HD notes to Bibiana Iqbal to [phone]. She is aware of the transportation and that pt will start this Saturday  (7/24/2021) with verbalized understanding. CRUZITO called Stephanie Jimenez to schedule ambulance transport for pt to home ([phone]), as pt needs this to be covered through his insurance. CRUZITO spoke with Marisol with [facility]. (d/t pt need HD still for today and not being ran as of yet, scheduling for a later  time). CM attempted to schedule pt. Marisol states that pt's policy does not allow for pt to be transported via stretcher from hospital to home. CRUZITO did explained that his did not make sense as pt is transported to and from wound clinic from home, and to and from HD from home. Marisol states that it is just not covered for pt to have a stretcher from hospital to home. Stretcher is the safest way for pt to travel, as he is paraplegic. CRUZITO spoke with pt regarding this issue and inquired if he would be alright with using Medicare for transport and CM calling [facility]. Pt was agreeable. Cm called [facility] and spoke with Alessandra Sinclair ([phone]) and he states that he will pick pt up and transport pt to home today at 1600. CRUZITO spoke with Dr. Jorge Lopez with Wound Care. Per Dr. Jorge Lopez, unless there is a big wound development, he can wait to be seen next week. CM informed Tesha Dominguez RN of this and to let CM know regarding wounds. If no new wounds, then CM to call [facility] to cancel transport for tomorrow. CM informed pt and children. CM did go back to HD dept to speak with HD RN, however no one is back there at this time    Per SHAINA Mendez to be ran at 12 noon today. Addendum 1400: CRUZITO spoke with Tesha Dominguez RN ad she states she informed Mimi of opt's  time and per Mauri Montgomery, they stated they would speak with Dr. Billie Marmolejo. CM informed LIONEL Del Valle in HD that pt has a pickup time at 1600.  Eligio RN states that she spoke with Dr. Marii Shafer ad he approved for pt to stop HD at 1510pm and return to room. CM updated Kelley Treviño RN of this conversation with verbalized understanding. No further discharge needs needed or noted. Reviewed chart. Role of discharge planner explained and patient verbalized understanding. Discharge order is noted. Has Home O2 in place on admit:  No  Informed of need to bring portable home O2 tank on day of discharge for nursing to connect prior to leaving:   Not Indicated  Verbalized agreement/Understanding:   Not Indicated  Pt is being d/c'd to home today. Pt's O2 sats are 96% on RA. Discharge timeout done with Kelley Treviño RN. All discharge needs and concerns addressed.

## 2021-07-22 NOTE — PROGRESS NOTES
Patient educated on discharge instructions as well as new medications use, dosage, administration and possible side effects. Patient verified knowledge. IV removed without difficulty and dry dressing in place. Port de accessed. Delgado Removed. Telemetry monitor removed and returned to [facility]. Pt left facility in stable condition to Home with all of their personal belongings.

## 2021-07-22 NOTE — PROGRESS NOTES
Nephrology Progress Note   [facility]      This patient is a 50year old male whom we are following for SUDHA, on CKRT. Subjective:  CRRT was stopped on 7/14  HD on 7/15 with 1.2 L UF over 3.5 hours, 25% of albumin 25 g x 2 given  HD on 7/17 with 152 ml net UF, complicated by hypotension needing midodrine and IV albumin 25 g x 2  HD on 7/20 with 1.1 L net UF, post weight 128.4 kg, patient received midodrine    Seen and examined at HD on 7/22 w no fluid removal planned     Urine output in last 24 hours is 340 mL    ROS: No fever or chills. Social: family at bedside. Vitals:  BP (!) 114/53   Pulse 102   Temp 98.1 °F (36.7 °C)   Resp 18   Ht 6' 2\" (1.88 m)   Wt 289 lb 8 oz (131.3 kg)   SpO2 94%   BMI 37.17 kg/m²   I/O last 3 completed shifts: In: 200 [P.O.:980]  Out: 770 [Urine:165; Drains:105; Stool:500]  No intake/output data recorded. Physical Exam:  General appearance: Seems comfortable, no acute distress. Neck: Trachea midline, thyroid normal.   Lungs:  Non labored breathing, CTA to anterior auscultation. Heart:  S1S2 normal, rub or gallop. + peripheral edema. Abdomen: Soft, non-tender, no organomegaly. Skin: No lesions or rashes, warm to touch.    Access: LIJ temp HD catheter      Medications:   apixaban  2.5 mg Oral BID    insulin glargine  15 Units Subcutaneous Nightly    midodrine  10 mg Oral TID WC    insulin lispro  0-18 Units Subcutaneous Q4H    aspirin  81 mg Oral Nightly    budesonide-formoterol  2 puff Inhalation BID    pantoprazole  40 mg Intravenous Daily    sodium chloride flush  5-40 mL Intravenous 2 times per day     Labs:  Recent Labs     07/20/21  0625 07/21/21  0527   WBC 12.3* 9.2   HGB 9.9* 9.8*   HCT 32.4* 31.7*   MCV 80.3 81.2   * 88*     Recent Labs     07/20/21  0427 07/21/21  0527 07/22/21  0444   * 127* 128*   K 4.1 4.0  4.0 4.1   CL 99 99 100   CO2 17* 20* 18*   GLUCOSE 196* 168* 136*   PHOS 5.7* 3.9 4.6   MG 1.70* 1.70* 1.40*   BUN 32* 21* 27*   CREATININE 2.2* 1.6* 1.9*   LABGLOM 31* 45* 37*   GFRAA 38* 55* 45*         Assessment/Plan:  Acute Kidney Injury:    - Etiology:  ATN / Septic shock  - CRRT from 7/6/2021 till 7/14/21. Access LIJ temp HD catheter.  -iHD initiated 7/15   -Tuesday Thursday Saturday schedule, accepted at [facility] at 11:30 AM   -Status post left IJ tunneled dialysis on 7/19   - increase bicarb bath to 35   Midodrine 10 mg pre and mid HD, Keep SBP>100   Heparin per std protocol   Epo 4k w hD    Orders called in    -Outpatient HD unit arrangement  -midodrine 10 mg TID for BP  -Monitor signs of renal recovery  -pt does straight cath every 4hrs     Septic Shock with MOSF.  - Etiology: Cholangitis   - Clinical:  GI consulted, on pressors and broad spectrum antibiotics. - Incomplete ERCP, failed cannulation on 7/6/21. ERCP again on 7/21  - Leukocytosis improving/stable.     Metabolic Acidosis. - From lactic acidosis and SUDHA.   -  resolved     Respiratory Failure: Page Perfect

## 2021-07-23 NOTE — CARE COORDINATION
[facility] Transitions Initial Follow Up Call    Call within 2 business days of discharge: Yes    Patient: Fabrizio Cortes Patient : 1965   MRN: [id number]  Reason for Admission: septic shock, choledocholithiasis, chronic cholecystitis, acute cholangitis s/p ERCP with stone removal (needs cesar in 2-3 weeks OP), acute hypoxic resp failure, acute renal failure, severe metabolic acidosis, elevated LFTs, hyperbilirubinemia, ischemic cardiomyopathy, acute on chronic sCHF, CAD w/ hx stents, microcytic anemia, DM2, HLD, HTN, paraplegia, neurogenic bladder, chronic wounds/osteo, E Coli UTI, A Fib remote hx PE, GERD, Gitelman Syndrome  Discharge Date: 21 RARS: Readmission Risk Score: 52      Last Discharge [facility]       Complaint Diagnosis Description Type Department Provider    21 Dehydration; Nausea Cholangitis . .. ED to Hosp-Admission (Discharged) (ADMITTED) Stephanie Mckinney MD; Jen Wong. .. Spoke with: Fabrizio Cortes (patient)    Facility: [facility]    Non-face-to-face services provided:  Obtained and reviewed discharge summary and/or continuity of care documents  Education of patient/family/caregiver/guardian to support self-management-s/s monitor  Assessment and support for treatment adherence and medication management-1111F completed    Was this an external facility discharge? No Discharge Facility: NA    Challenges to be reviewed by the provider   Additional needs identified to be addressed with provider No         Method of communication with provider : none    Advance Care Planning:   Does patient have an Advance Directive:  decision maker updated. Was this a readmission? No  Patient stated reason for admission: nausea, dehydration, constipation  Patients top risk factors for readmission: functional physical ability and medical condition-     Care Transition Nurse (CTN) contacted the patient by telephone to perform post hospital discharge assessment.

## 2021-07-30 NOTE — PROGRESS NOTES
[facility] Physician Orders and Discharge [address]  [address]  [place], [facility]  Telephone: [phone]      Fax: [phone]        Your home care company:   [facility]     Your wound-care supplies will be provided by:  Home care     NAME:  Kuldeep Rosenbaum   YOB: 1965  PRIMARY DIAGNOSIS FOR WOUND CARE CENTER:  Pressure Ulcer Stage 2     Wound cleansing:   Do not scrub or use excessive force. Wash hands with soap and water before and after dressing changes. Prior to applying a clean dressing, cleanse wound with normal saline, wound cleanser, or mild soap and water. Ask your physician or nurse before getting the wound(s) wet in the shower.                 Wound care for home:     Left Knee and Left Shin:  Apply Polysporin, Benzoin and mepilex border   Change in one week (apply antibiotic ointment and bandaid at home)    Right Knee:   Vashe, Polysporin, Mepitel One  cast padding x 2  Ace then cotton stockinette  Leave on for 1 week (we sent a Mepitel one and cast padding for your dressing change in 1 week at home)     Right and Left Chest wall Wound:  Calmoseptine to alin wound  Small strip of Silver Alginate gently tucked in to the wounds  Cover with an absorptive dressing  Change 3 x week      Chronic Open Back Wound:   Spray Hypochlorous Acid into wound let sit, do not rinse  Calmoseptine to alin wound  Silver Alginate for drainage as needed (we are applying today)  Cover with a Mepilex Border   Change 3 x week     Scrotum/Buttocks/Posterior Thigh: HEALED  Calmoseptine to protect skin        Please note, all wounds (unless stated otherwise here) were mechanically debrided at the time of cleansing here in the wound-care center today, so a small amount of pain, drainage or bleeding from that process might be expected, and is normal.      All products for home use, including multiple products for a single wound if applicable, are medically necessary in order to achieve the best chance at timely wound healing. See provider documentation for details if needed. Substituted dressings applied in the [facility] today, if applicable:           New orders for this week (labs, imaging, medications, etc.):           Additional instructions for specific diagnoses:     Continue to use the HIBICLENS (or the generic version) after your bath on the areas that are more troublesome such as your knee, chest and even around the back wound, make sure you let it dry, do not rinse        F/U Appointment is with Dr. Nunu Castle in 2 weeks   on                                   at                       .     Your nurse  is Heidi FLOWERS. If we applied slip-resistant hospital socks today, be sure to remove them at least once a day to inspect your toes or feet, even if you're not changing the wraps or dressings underneath. If you see anything concerning (redness, excess moisture, etc), please call and let us know right away.      Should you experience any significant changes in your wound(s) (including redness, increased warmth, increased pain, increased drainage, odor, or fever) or have questions about your wound care, please contact the [address] [place] [address] at [phone] Monday-Thursday from 8:00 am - 4:30 pm, or Friday from 8:00 am - 2:30 pm.  If you need help with your wound outside these hours and cannot wait until we are again available, contact your home-care company (if applicable), your PCP, or go to the nearest emergency room

## 2021-08-02 NOTE — PLAN OF CARE
Scrotum/buttocks/posterior thigh wounds healed, otherwise wounds are slowly improving and chronic back wound is stable. Follow up in wound clinic in 2 weeks. Discharge instructions reviewed with patient, all questions answered, copy given to patient. Dressings were applied to all wounds per M.D. Instructions at this visit.

## 2021-08-02 NOTE — CARE COORDINATION
[facility] Transitions Follow Up Call    2021    Patient: Yamileth Lin  Patient : 1965   MRN: [id number]  Reason for Admission: septic shock, choledocholithiasis, chronic cholecystitis, acute cholangitis s/p ERCP with stone removal (needs cesar in 2-3 weeks OP), acute hypoxic resp failure, acute renal failure, severe metabolic acidosis, elevated LFTs, hyperbilirubinemia, ischemic cardiomyopathy, acute on chronic sCHF, CAD w/ hx stents, microcytic anemia, DM2, HLD, HTN, paraplegia, neurogenic bladder, chronic wounds/osteo, E Coli UTI, A Fib remote hx PE, GERD, Gitelman Syndrome  Discharge Date: 21 RARS: Readmission Risk Score: 52         Spoke with: Yamileth Lin (patient)    No issues with HD. Kidneys producing more urine - reports 2400ml/24h yesterday with straight cath during day and condom cath during night. Overall feeling well. Denies fever, cough, shakes, chills. Active with [facility] and expects SN visit today. Sees Dr Stella Proctor on Wednesday to discuss cholecystectomy. Still has JESUSITA drain with output requiring drainage 4x daily. He denies needs today. Feeling well. He has CTN contact info for future needs. Care Transitions Subsequent and Final Call    Schedule Follow Up Appointment with PCP: Completed  Subsequent and Final Calls  Do you have any ongoing symptoms?: No  Have your medications changed?: No  Do you have any questions related to your medications?: No  Do you currently have any active services?: Yes  Are you currently active with any services?: Home Health, Outpatient/Community Services, Transportation Services, Other  Do you have any needs or concerns that I can assist you with?: No  Identified Barriers: Impairment  Care Transitions Interventions  Other Interventions:            Follow Up  Future Appointments   Date Time Provider Alyson Cardona   2021  1:30 PM Miroslava Callahan MD [facility]   2021 10:15 AM Cheri Mo MD [address] [employer] [facility]   8/27/2021 10:15 AM MD Lacy Bertrand [facility]   9/10/2021 10:15 AM MD Lacy Bertrand [facility]   9/23/2021 10:15 AM Yana Liz MD [facility]   9/24/2021 10:15 AM Exie Lawman, MD [facility]   10/8/2021 10:15 AM MD Lacy Bertrand [facility]       Isak Suarez RN

## 2021-08-03 PROBLEM — L02.213 ABSCESS OF CHEST WALL: Status: RESOLVED | Noted: 2021-04-24 | Resolved: 2021-01-01

## 2021-08-03 PROBLEM — A41.9 SEPTICEMIA (HCC): Status: RESOLVED | Noted: 2021-01-01 | Resolved: 2021-01-01

## 2021-08-03 PROBLEM — L97.811 ULCER OF RIGHT KNEE, LIMITED TO BREAKDOWN OF SKIN (HCC): Status: ACTIVE | Noted: 2021-01-01
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
## 2021-08-09 NOTE — CARE COORDINATION
[facility] Transitions Follow Up Call    2021    Patient: Rachna Tristan  Patient : 1965   MRN: [id number]  Reason for Admission: septic shock, choledocholithiasis, chronic cholecystitis, acute cholangitis s/p ERCP with stone removal (needs cesar in 2-3 weeks OP), acute hypoxic resp failure, acute renal failure, severe metabolic acidosis, elevated LFTs, hyperbilirubinemia, ischemic cardiomyopathy, acute on chronic sCHF, CAD w/ hx stents, microcytic anemia, DM2, HLD, HTN, paraplegia, neurogenic bladder, chronic wounds/osteo, E Coli UTI, A Fib remote hx PE, GERD, Gitelman Syndrome  Discharge Date: 21 RARS: Readmission Risk Score: 52         Spoke with: Rachna Tristan (patient)    Needs cardiac clearance for cesar. States he is unable to get in to establish with Dr PENELOPE CURRIE until  so he has call to his current provider Sandrita Butt with [address]. His HD is now only 2 days/week and will have labwork tomorrow to make further decisions. He is making good urine (1700ml out yesterday daytime and 800ml through night). Managing drain without issues. Feels overall well. Denies needs/concerns. Has CTN contact info for prn needs. Care Transitions Subsequent and Final Call    Schedule Follow Up Appointment with PCP: Completed  Subsequent and Final Calls  Do you have any ongoing symptoms?: No  Have your medications changed?: No  Do you have any questions related to your medications?: No  Do you currently have any active services?: Yes  Are you currently active with any services?: Home Health, Outpatient/Community Services, Transportation Services, Other  Do you have any needs or concerns that I can assist you with?: No  Identified Barriers: Impairment  Care Transitions Interventions  Other Interventions:            Follow Up  Future Appointments   Date Time Provider Department Center   2021 10:15 AM MD Ramez Atwood [facility]   2021 10:15 AM MD Brittny Atwood Narda Cope [facility]   8/27/2021 12:45 PM Maryjane Harrington MD [facility]   9/10/2021 10:15 AM MD Hodan Arevalo [facility]   9/23/2021 10:15 AM Stanislav Mcknight MD [facility]   9/24/2021 10:15 AM MD Hodan Arevalo [facility]   10/8/2021 10:15 AM MD Hodan Arevalo [facility]       Janee Haque RN

## 2021-08-13 NOTE — PROGRESS NOTES
[address] [facility] [address]  [address], [address]  [place], [facility]  Telephone: [phone]      Fax: [phone]        Your home care company:   [facility]     Your wound-care supplies will be provided by:  Home care     NAME:  Cyrus Joyce   YOB: 1965  PRIMARY DIAGNOSIS FOR WOUND CARE CENTER:  Pressure Ulcer Stage 2     Wound cleansing:   Do not scrub or use excessive force. Wash hands with soap and water before and after dressing changes. Prior to applying a clean dressing, cleanse wound with normal saline, wound cleanser, or mild soap and water. Ask your physician or nurse before getting the wound(s) wet in the shower.                 Wound care for home:     Left Knee Wound:  Apply Polysporin, Benzoin and mepilex border   Change in one week (apply antibiotic ointment and bandaid at home)     Right Knee:   Vashe, Polysporin, Mepitel One  cast padding x 2  Ace then cotton stockinette  Leave on for 1 week (we sent a Mepitel one and cast padding for your dressing change in 1 week at home)     Left Chest wall Wound:  Calmoseptine to alin wound  Small strip of Silver Alginate gently tucked in to the wounds  Cover with an absorptive dressing  Change 3 x week      Chronic Open Back Wound:   Spray Hypochlorous Acid into wound let sit, do not rinse  Calmoseptine to alin wound  Silver Alginate for drainage as needed (we are applying today)  Cover with a Mepilex Border   Change 3 x week     Scrotum/Buttocks/Posterior Thigh: HEALED  Calmoseptine to protect skin        Please note, all wounds (unless stated otherwise here) were mechanically debrided at the time of cleansing here in the wound-care center today, so a small amount of pain, drainage or bleeding from that process might be expected, and is normal.      All products for home use, including multiple products for a single wound if applicable, are medically necessary in order to achieve the best chance at timely wound healing. See provider documentation for details if needed. Substituted dressings applied in the [facility] today, if applicable:           New orders for this week (labs, imaging, medications, etc.):           Additional instructions for specific diagnoses:     Continue to use the HIBICLENS (or the generic version) after your bath on the areas that are more troublesome such as your knee, chest and even around the back wound, make sure you let it dry, do not rinse        F/U Appointment is with Dr. Axel Bass in 2 weeks   on                                   at                       .     Your nurse  is Heidi FLOWERS. If we applied slip-resistant hospital socks today, be sure to remove them at least once a day to inspect your toes or feet, even if you're not changing the wraps or dressings underneath. If you see anything concerning (redness, excess moisture, etc), please call and let us know right away.      Should you experience any significant changes in your wound(s) (including redness, increased warmth, increased pain, increased drainage, odor, or fever) or have questions about your wound care, please contact the [facility] at [phone] Monday-Thursday from 8:00 am - 4:30 pm, or Friday from 8:00 am - 2:30 pm.  If you need help with your wound outside these hours and cannot wait until we are again available, contact your home-care company (if applicable), your PCP, or go to the nearest emergency room

## 2021-08-15 PROBLEM — L89.312: Status: RESOLVED | Noted: 2019-03-05 | Resolved: 2021-01-01
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
## 2021-08-16 NOTE — PLAN OF CARE
No changes in wound care regime. Right chest is healed  And left chest should heal soon as well. Patient continues to have home care for intermittent wound care in the home. Patient is to follow up in wound clinic as ordered in 2 weeks. Discharge instructions reviewed with patient, all questions answered, copy given to patient. Dressings were applied to all wounds per M.D. Instructions at this visit.

## 2021-08-17 NOTE — CARE COORDINATION
Up  Future Appointments   Date Time Provider Alyson Dulce   8/27/2021 10:15 AM MD Clyde Potts [facility]   8/27/2021 12:45 PM Mami Mejia MD P CLER CAR [facility]   9/3/2021 10:15 AM MD Clyde Potts [facility]   9/10/2021 10:15 AM MD Clyde Potts [facility]   9/17/2021 10:15 AM MD Clyde Potts [facility]   9/23/2021 10:15 AM Junior Yolis MD CLERM PULM [facility]   9/24/2021 10:15 AM MD Clyde Potts [facility]   10/8/2021 10:15 AM MD Clyde Potts [facility]       Joaquín Dowell RN

## 2021-08-20 NOTE — TELEPHONE ENCOUNTER
Pt called asking about his cardiac clearance from  and would like to receive a call back to discuss the situation. Callback is [phone].

## 2021-08-23 NOTE — TELEPHONE ENCOUNTER
I called patient. We received echo results from . He has appt with Dr. Gisel Hayes at [facility] on 8/27/21 for 3 month f/up and also cardiac clearance, si he is aware to keep his appt with Dr. Gisel Hayes.

## 2021-08-24 NOTE — ED NOTES
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

## 2021-08-24 NOTE — ED NOTES
Patient notifying ambulance service for transfer [facility]     [facility]  08/24/21 9929

## 2021-08-24 NOTE — ED NOTES
[facility] office for consult  3203 - Dr. Gloria Olivera called back.       Xavier Hastings  08/24/21 0710
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
## 2021-08-24 NOTE — ED TRIAGE NOTES
Pt states he had an infected bile duct d/t a stone that was removed. Pt states he is scheduled to have gallbladder out in two weeks.

## 2021-08-24 NOTE — ED NOTES
Patient transported by transport service. Patient left via stretcher with all belongings in stable condition. Discharge instructions reviewed.  Patient denies questions or concerns at this time     Sophia Russ RN  08/24/21 6128

## 2021-08-25 NOTE — PATIENT INSTRUCTIONS
Patient Education        Diabetes Blood Sugar Emergencies: Your Action Plan  How can you prevent a blood sugar emergency? An important part of living with diabetes is keeping your blood sugar in your target range. You'll need to know what to do if it's too high or too low. Managing your blood sugar levels helps you avoid emergencies. This care sheet will teach you about the signs of high and low blood sugar. It will help you make an action plan with your doctor for when these signs occur. Low blood sugar is more likely to happen if you take certain medicines for diabetes. It can also happen if you skip a meal, drink alcohol, or exercise more than usual.  You may get high blood sugar if you eat differently than you normally do. One example is eating more carbohydrate than usual. Having a cold, the flu, or other sudden illness can also cause high blood sugar levels. Levels can also rise if you miss a dose of medicine. Any change in how you take your medicine may affect your blood sugar level. So it's important to work with your doctor before you make any changes. Track your blood sugar  Work with your doctor to fill in the blank spaces below that apply to you. Track your levels, know your target range, and write down ways you can get your blood sugar back in your target range. A log book can help you track your levels. Take the book to all of your medical appointments. · Check your blood sugar _____ times a day, at these times:________________________________________________. (For example: Before meals, at bedtime, before exercise, during exercise, other.)  · Your blood sugar target range before a meal is ___________________. Your blood sugar target range after a meal is _______________________. · Do this___________________________________________________to get your blood sugar back within your safe range if your blood sugar results are _________________________________________.  (For example: Less than 70 or above 250 mg/dL.)  Call your doctor when your blood sugar results are ___________________________________. (For example: Less than 70 or above 250 mg/dL.)  What are the symptoms of low and high blood sugar? Common symptoms of low blood sugar are sweating and feeling shaky, weak, hungry, or confused. Symptoms can start quickly. Common symptoms of high blood sugar are feeling very thirsty or very hungry. You may also pass urine more often than usual. You may have blurry vision and may lose weight without trying. But some people may have high or low blood sugar without having any symptoms. That's a good reason to check your blood sugar on a regular schedule. What should you do if you have symptoms? Work with your doctor to fill in the blank spaces below that apply to you. Low blood sugar and \"the rule of 15\"  If you have symptoms of low blood sugar, check your blood sugar. If it's below _____ ( for example, below 70), eat or drink a quick-sugar food that has about 15 grams of carbohydrate. Your goal is to get your level back to your safe range. Check your blood sugar again 15 minutes later. If it's still not in your target range, take another 15 grams of carbohydrate and check your blood sugar again in 15 minutes. Repeat this until you reach your target. Then go back to your regular testing schedule. Children usually need less than 15 grams of carbohydrate. Check with your doctor or diabetes educator for the amount that is right for your child. When you have low blood sugar, it's best to stop or reduce any physical activity until your blood sugar is back in your target range and is stable. If you must stay active, eat or drink 30 grams of carbohydrate. Then check your blood sugar again in 15 minutes. If it's not in your target range, take another 30 grams of carbohydrates. Check your blood sugar again in 15 minutes. Keep doing this until you reach your target.  You can then go back to your regular testing schedule. If your symptoms or blood sugar levels are getting worse or have not improved after 15 minutes, seek medical care right away. If you take insulin, always carry a glucagon emergency kit. Be sure your family, friends, and coworkers know how to give glucagon. Here are some examples of quick-sugar foods with 15 grams of carbohydrate:  · 3 or 4 glucose tablets  · 1 tablespoon (3 teaspoons) table sugar  · ½ cup to ¾ cup (4 to 6 ounces) of fruit juice or regular (not diet) soda  · Hard candy (such as 6 Life Savers)  High blood sugar  If you have symptoms of high blood sugar, check your blood sugar. Your goal is to get your level back to your target range. If it's above ______ ( for example, above 250), follow these steps:  · If you missed a dose of your diabetes medicine, take it now. Take only the amount of medicine that you have been prescribed. Do not take more or less medicine. · Give yourself insulin if your doctor has prescribed it for high blood sugar. · Test for ketones, if the doctor told you to do so. If the results of the ketone test show a moderate-to-large amount of ketones, call the doctor for advice. · Wait 30 minutes after you take the extra insulin or the missed medicine. Check your blood sugar again. If your symptoms or blood sugar levels are getting worse or have not improved after taking these steps, seek medical care right away. Follow-up care is a key part of your treatment and safety. Be sure to make and go to all appointments, and call your doctor if you are having problems. It's also a good idea to know your test results and keep a list of the medicines you take. Where can you learn more? Go to [email or web address] and sign in to your [employer] account. Enter A035 in the [employer] box to learn more about \"Diabetes Blood Sugar Emergencies: Your Action Plan. \"     If you do not have an account, please click on the \"Sign Up Now\" link.   Current as of: August 31, 2020               Content Version: 12.9  © 2006-2021 Healthwise, [employer]. Care instructions adapted under license by [facility] ([facility]). If you have questions about a medical condition or this instruction, always ask your healthcare professional. [address] any warranty or liability for your use of this information. Patient Education        Hemoglobin A1c: About This Test  What is it? An A1c test is a blood test that checks your average blood sugar level over the past 2 to 3 months. This test also is called a glycohemoglobin test or a hemoglobin A1c test.  Why is this test done? The A1c test is one of the tests used to diagnose prediabetes and diabetes. If you have diabetes, this test is done to check how well your diabetes has been controlled over the past 2 to 3 months. Your doctor can use this information to adjust your treatment, if needed. How do you prepare for the test?  You don't need to stop eating before you have an A1c test. This test can be done at any time during the day, even after a meal.  How is the test done? A health professional uses a needle to take a blood sample, usually from an arm. What do the results of the test mean? The test result is usually given as a percentage. The normal A1c is less than 5.7%. You have a higher risk for diabetes if your A1c is 5.7% to 6.4%. If your level is 6.5% or higher, you have diabetes. The A1c test result also can be used to find your estimated average glucose, or eAG. Your eAG and A1c show the same thing in two different ways. They both help you learn more about your average blood sugar range over the past 2 to 3 months. A1c is shown as a percentage, while eAG uses the same units (mg/dl) as your glucose meter.   Examples:  · 6% A1c = 126 mg/dL  · 7% A1c = 154 mg/dL  · 8% A1c = 183 mg/dL  · 9% A1c = 212 mg/dL  · 10% A1c = 240 mg/dL  · 11% A1c = 269 mg/dL  · 12% A1c = 298 mg/dL  Where can you learn more?  Go to [email or web address] and sign in to your [employer] account. Enter U216 in the [facility] box to learn more about \"Hemoglobin A1c: About This Test.\"     If you do not have an account, please click on the \"Sign Up Now\" link. Current as of: August 31, 2020               Content Version: 12.9  © 2006-2021 Healthwise[employer]. Care instructions adapted under license by [facility] ([facility]). If you have questions about a medical condition or this instruction, always ask your healthcare professional. [place] [address] any warranty or liability for your use of this information.
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
## 2021-08-27 NOTE — PROGRESS NOTES
[address] [facility] Physician Orders and Discharge [address]  [address], [address]  [place], [facility]  Telephone: [phone]      Fax: [phone]        Your home care company:   [facility]     Your wound-care supplies will be provided by:  Home care     NAME:  Haven David   YOB: 1965  PRIMARY DIAGNOSIS FOR WOUND CARE CENTER:  Pressure Ulcer Stage 2     Wound cleansing:   Do not scrub or use excessive force. Wash hands with soap and water before and after dressing changes. Prior to applying a clean dressing, cleanse wound with normal saline, wound cleanser, or mild soap and water. Ask your physician or nurse before getting the wound(s) wet in the shower. Wound care for home:     Left Knee and Left Shin:  Apply Polysporin, Benzoin and mepilex border   Change in one week (apply antibiotic ointment and bandaid at home)     Right Knee: Dr. Dasha Chun used Silver Nitrate on your wound today.   The wound will be black or silver in appearance  for the next several days, this is normal.   Vashe, Polysporin, Mepitel One  cast padding x 2  Ace then cotton stockinette  Leave on for 1 week (we sent a Mepitel one and cast padding for your dressing change in 1 week at home)     Right and Left Chest wall Wound:  Calmoseptine to alin wound  Small strip of Silver Alginate gently tucked in to the wounds  Cover with an absorptive dressing  Change 3 x week      Chronic Open Back Wound:   Spray Hypochlorous Acid into wound let sit, do not rinse  Calmoseptine to alin wound  Silver Alginate for drainage as needed (we are applying today)  Cover with a Mepilex Border   Change 3 x week     Scrotum/Buttocks/Posterior Thigh: HEALED  Calmoseptine to protect skin        Please note, all wounds (unless stated otherwise here) were mechanically debrided at the time of cleansing here in the wound-care center today, so a small amount of pain, drainage or bleeding from that process might be expected, and is normal.      All products for home use, including multiple products for a single wound if applicable, are medically necessary in order to achieve the best chance at timely wound healing. See provider documentation for details if needed. Substituted dressings applied in the [facility] today, if applicable:           New orders for this week (labs, imaging, medications, etc.):           Additional instructions for specific diagnoses:     Continue to use the HIBICLENS (or the generic version) after your bath on the areas that are more troublesome such as your knee, chest and even around the back wound, make sure you let it dry, do not rinse        F/U Appointment is with Dr. Joss Danielle in 2 weeks   on                                   at                       .     Your nurse  is Heidi FLOWERS. If we applied slip-resistant hospital socks today, be sure to remove them at least once a day to inspect your toes or feet, even if you're not changing the wraps or dressings underneath. If you see anything concerning (redness, excess moisture, etc), please call and let us know right away.      Should you experience any significant changes in your wound(s) (including redness, increased warmth, increased pain, increased drainage, odor, or fever) or have questions about your wound care, please contact the [address] [place] [address] at [phone] Monday-Thursday from 8:00 am - 4:30 pm, or Friday from 8:00 am - 2:30 pm.  If you need help with your wound outside these hours and cannot wait until we are again available, contact your home-care company (if applicable), your PCP, or go to the nearest emergency room

## 2021-08-27 NOTE — TELEPHONE ENCOUNTER
Patient scheduled surgery for Lap cesar w/ cholang on 9/1/2021. He was cleared for surgery by cardiology at [facility], and alos established care with Dr. Gisel Hayes today. He is aware to hold Eliquis 3 days preop. He is asking if he needs to hold Entresto and also Aspirin 81 mg? And he has to arrange for ambulance to transport him for doctor appts, wound care and surgery. Due to difficulty and cost of arranging ambulance, would he be able to have a rapid Covid test on day of surgery when he arrives? He has not received Covid vaccines. He states otherwise, he will have to post-pone surgery to a different week due to transportation arrangements.

## 2021-08-27 NOTE — PATIENT INSTRUCTIONS
Plan:  1. Recommend echocardiogram in last October to evaluate heart function. Will consider ICD placement if heart function remains low, (under 35%)  2. Labs- Fasting lipids and BMP- have the day you have your echocardiogram   3. Continue current medications   4. Follow up with me in November     Your provider has ordered testing for further evaluation. An order/prescription has been included in your paper work.  To schedule outpatient testing, contact Central Scheduling by calling [address] [place] [address] ([phone]).

## 2021-08-30 NOTE — PLAN OF CARE
Continued improvement toed in wounds today and left chest is getting closer to healing. NO chnges in wound care regime, patient is to follow up in 2 weeks. Home care remains in place for intermittent wound care in the home. Discharge instructions reviewed with patient, all questions answered, copy given to patient. Dressings were applied to all wounds per M.D. Instructions at this visit.

## 2021-08-30 NOTE — TELEPHONE ENCOUNTER
----- Message from Enoc Rojas MD sent at 8/30/2021  1:00 PM EDT -----  Please do  ----- Message -----  From: Bard Armstrong  Sent: 8/30/2021  12:41 PM EDT  To: Enoc Rojas MD    Pt requesting you to sign refill for metoprolol. States he is out of it and you have now been refilling it instead of Dr. Tommie Clement.

## 2021-08-30 NOTE — TELEPHONE ENCOUNTER
Patient called back. He states he straight cath's himself at home. He is asking if they can use a langley cath on him during surgery, so he won't have urinary incontinence during surgery?

## 2021-08-31 NOTE — PRE-PROCEDURE INSTRUCTIONS

## 2021-08-31 NOTE — PROGRESS NOTES
[facility] for [facility]ive Surgery/Invasive Procedures While COVID-19 present in the community     Have you had any of the following symptoms? o Fever, chills  o Cough  o Shortness of breath  o Muscle aches/pain  o Diarrhea  o Abdominal pain, nausea, vomiting  o Loss or decrease in taste and / or smell   Risk of Exposure  o Have you recently been hospitalized for COVID-19 or flu-like illness, if so when?  o Recently diagnosed with COVID-19, if so when?  o Recently tested for COVID-19, if so when?  o Have you been in close contact with a person or family member who currently has or recently had COVID-19? If yes, when and in what context?  o Do you live with anybody who in the last 14 days has had fever, chills, shortness of breath, muscle aches, flu-like illness?  o Do you have any close contacts or family members who are currently in the hospital for COVID-19 or flu-like illness? If yes, assess recent close contact with this person. Indicate if the patient has a positive screen by answering yes to one or more of the above questions. Patients who test positive or screen positive prior to surgery or on the day of surgery should be evaluated in conjunction with the surgeon/proceduralist/anesthesiologist to determine the urgency of the procedure.
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
## 2021-09-01 NOTE — PROGRESS NOTES
Called and spoke with  aware patient did not stop Eliquis  states due to complicated case and this being an elective surgery patient will need to be re-scheduled procedure patient updated,  and Sandy FLOWERS aware. Transport service called for patient  . Ollie Vera RN

## 2021-09-04 NOTE — PROGRESS NOTES
[facility]      S:   Patient presents for follow up of recent ICU admission for CBD stone and cholangitis. He reports marked improvement. He is eating well. He had ERCP with stone extraction. .    O:   Comfortable. No distress. Chest CTA   Heart regular   Abdomen soft. Non distended. Mild tenderness  RUQ. A:     Encounter Diagnoses   Name Primary?  Choledocholithiasis Yes    Cardiomyopathy, unspecified type ([facility] [address]     Severe obesity (BMI 35.0-35.9 with comorbidity) [facility]             P:   The diagnosis and recommended procedure were explained. Questions answered. Prepare for gallbladder surgery. Will get cardiac clearance. Greater than 50% visit spent counseling about diagnosis, treatment plan and expected post operative course.

## 2021-09-05 PROBLEM — I50.43 CHF (CONGESTIVE HEART FAILURE), NYHA CLASS I, ACUTE ON CHRONIC, COMBINED (HCC): Status: ACTIVE | Noted: 2021-01-01

## 2021-09-05 NOTE — ED NOTES
Dotve sent to Dr. Aleksandra Cardozo at [facility] [place] [facility] [place]  09/05/21 8509    [employer] completed with orders placed at [address] [place] [address]  09/05/21 6028

## 2021-09-05 NOTE — ED NOTES
Bed: 06  Expected date:   Expected time:   Means of arrival:   Comments:  eden     [facility]  09/05/21 8845
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
## 2021-09-05 NOTE — PROGRESS NOTES
Patient admitted to room 303 from the ED. Patient oriented to room, call light, bed rails, phone, lights and bathroom. Patient instructed about the schedule of the day including: vital sign frequency, lab draws, possible tests, frequency of MD and staff rounds, daily weights, I &O's and prescribed diet. + bed alarm in place, patient aware of placement and reason. Telemetry box in place, patient aware of placement and reason. Bed locked, in lowest position, side rails up 2/4, call light within reach. Recliner Assessment  Patient is not able to demonstrate the ability to move from a reclining position to an upright position within the recliner due to parapalegia. 4 Eyes Skin Assessment     The patient is being assess for   Admission    I agree that 2 RN's have performed a thorough Head to Toe Skin Assessment on the patient. ALL assessment sites listed below have been assessed. Areas assessed for pressure by both nurses:   [x]   Head, Face, and Ears   [x]   Shoulders, Back, and Chest, Abdomen  [x]   Arms, Elbows, and Hands   [x]   Coccyx, Sacrum, and Ischium  [x]   Legs, Feet, and Heels        Skin Assessed Under all Medical Devices by both nurses:  Visible rods protruding through back, open wounds in L and R breast fold. Open wounds on LLE. R BKA. Colostomy bag, suture in R side from recent JESUSITA drain. Scabs and bruising scattered. Dry flaky skin. groin folds red. Healing wound on L great toe. All Mepilex Borders were peeled back and area peeked at by both nurses:  Yes  Please list where Mepilex Borders are located:  sacrum, under breast, LLE.              **SHARE this note so that the co-signing nurse is able to place an eSignature**    Co-signer eSignature: Electronically signed by Brock Herring RN on 9/5/21 at 7:57 PM EDT    Does the Patient have Skin Breakdown related to pressure?   Yes [facility] WOUND CARE was Initiated documentation include the Chetna-wound, Wound Assessment, Measurements, Dressing Treatment, Drainage, and Color\",     (Insert Photo here*                                                    )         Ismael Prevention initiated:  Yes   Wound Care Orders initiated:  Yes      [id number] nurse consulted for Pressure Injury (Stage 3,4, Unstageable, DTI, NWPT, Complex wounds)and New or Established Ostomies:  Yes      Primary Nurse eSignature: Electronically signed by Ayana Garcia RN on 9/5/21 at 7:41 PM EDT
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
## 2021-09-05 NOTE — ED PROVIDER NOTES
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 1445 09/05/21 1520 09/05/21 1545   BP:  86/60 95/61 95/61   Pulse: 56 62 61    Resp: 13 19     Temp:       TempSrc:       SpO2:  98% 92%    Weight:           Patient was given thefollowing medications:  Medications   levoFLOXacin (LEVAQUIN) 500 MG/100ML infusion 500 mg (has no administration in time range)   0.9 % sodium chloride bolus (1,000 mLs IntraVENous New Bag 9/5/21 1326)       1:04 PM EDT  BP low 87/62. History of sepsis. His mouth is dry. Although he is on fluid restrictions at home I think he would benefit from IV fluids 1 L normal saline was ordered over 2 hours. 4:05 PM EDT  Patient reevaluated. Stable. BP 95/61.   Patient stating his blood pressure normally runs in the 90s although I have concern for sepsis. Procalcitonin elevated. He has a urinary tract infection allergic to penicillin and cephalosporins he was given Levaquin. I held off on additional IV fluids at this time due to concern for fluid overload. CT scan abdomen pelvis no acute findings in abdomen, anasarca, small right pleural effusion. Troponin 0.18, stable from previous. He will be admitted. FINAL IMPRESSION      1. Acute sepsis ([facility]    2. Acute UTI    3. SUDHA (acute kidney injury) ([facility]    4. Hyponatremia    5. Anasarca          DISPOSITION/PLAN   DISPOSITION     PATIENT REFERREDTO:  No follow-up provider specified.     DISCHARGE MEDICATIONS:  New Prescriptions    No medications on file       DISCONTINUED MEDICATIONS:  Discontinued Medications    No medications on file              (Please note that portions ofthis note were completed with a voice recognition program.  Efforts were made to edit the dictations but occasionally words are mis-transcribed.)    Thania Nieto PA-C (electronically signed)           Victorino Amaro PA-C  09/05/21 0113

## 2021-09-06 NOTE — FLOWSHEET NOTE
09/06/21 1330   Vital Signs   Temp 97.6 °F (36.4 °C)   Temp Source Oral   Pulse 79   Heart Rate Source Monitor   Resp 18   BP 98/63   BP Location Left lower arm   Patient Position Semi fowlers   Level of Consciousness Alert (0)   MEWS Score 2   Oxygen Therapy   SpO2 93 %   O2 Device None (Room air)     Vitals improving. Pt without complaints.
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
## 2021-09-06 NOTE — FLOWSHEET NOTE
09/06/21 0116   Vitals   Temp 97.7 °F (36.5 °C)   Temp Source Oral   Pulse 65   Heart Rate Source Monitor   Resp 18   BP 92/60   BP Method Automatic   Patient Position Semi fowlers   Level of Consciousness Alert (0)   MEWS Score 2   Patient Currently in Pain Denies   Oxygen Therapy   SpO2 94 %   O2 Device None (Room air)   Patient in bed resting with eyes closed. Vitals stable. No distress noted at this time,will continue to monitor,call light within reach.

## 2021-09-06 NOTE — CARE COORDINATION
Case Management Assessment  Initial Evaluation      Patient Name: Zachariah Winston  YOB: 1965  Diagnosis: Anasarca [R60.1]  Hyponatremia [E87.1]  Acute UTI [N39.0]  SUDHA (acute kidney injury) ([facility] [N17.9]  CHF (congestive heart failure), NYHA class I, acute on chronic, combined ([facility] [address] [facility] [I50.43]  Acute sepsis [facility]) [A41.9]  Date / Time: 9/5/2021 12:17 PM    Admission status/Date:9/5/2021 inpt  Chart Reviewed: Yes      Patient Interviewed: Yes , pt approved for CM to speak in front of family in room  Family Interviewed:  Yes - son who is in the room.        Hospitalization in the last 30 days:  No      Health Care Decision Maker :   Primary Decision MakerAjunaid Jeovanny Child - [phone]    Primary Decision Maker: Jordon Cruz - Spouse - [phone]    (CM - must 1st enter selection under Navigator - emergency contact- [place] Relationship and pick relationship)   Who do you trust or have selected to make healthcare decisions for you      Met with: pt  Interview conducted  (bedside/phone): bedside    Current PCP: Bekah White MD    Financial  Medicare/[employer] [facility] of [address] required for SNF : Y, N          3 night stay required - Y, N    ADLS  Support Systems/Care Needs:    Transportation: EMS transportation    Meal Preparation: daughter and mother    Housing  Living Arrangements: trilevel with mother and daughter  Steps: ramp  Intent for return to present living arrangements: Yes  Identified Issues: paraplegic    [address] : Yes - [facility] for [facility] on MWF Agency:(Services)     Passport/Waiver : Yes - COA  :    Tana Staples [phone]                  Phone Number:    Passport/Waiver Services: pt no longer has any HHA that are coming to his home d/t them not being available          Durable Medical Equiptment   DME Provider: n/a  Equipment: yes  Walker___Cane___RTS___ BSC___Shower Chair___Hospital Bed_X__W/C____Other________  02 at ____Liter(s)---wears(frequency)_______ [facility] ___ CPAP___ BiPap___  ORIANA LIFT and POWER W/C  N/A____      Home O2 Use :  No    If No for home O2---if presently on O2 during hospitalization:  No  if yes CM to follow for potential DC O2 need  Informed of need for care provider to bring portable home O2 tank on day of discharge for nursing to connect prior to leaving:   Not Indicated  Verbalized agreement/Understanding:   Not Indicated    Community Service Affiliation  Dialysis:  No    · Agency:  · Location:  · Dialysis Schedule:  · Phone:   · Fax: Other Community Services: (ex:PT/OT,Mental Ardie Patches) Dr. Gene Gonzales for Wound Clinic    DISCHARGE PLAN: Explained Case Management role/services. Reviewed chart. Role of discharge planner explained and patient verbalized understanding. Pt gave approval for CM to speak in front of family that was in the room. Pt is paraplegic. PT WILL NEED [facility]BUALNCE TRANSPORT HOME AND REQUESTS [facility]. --Pt declines [facility]--He uses [facility] all of the time for transport to and from the [facility] [address]. Pt is active with [facility]. for SN on MWF. Pt is active with Dr. Gene Gonzales for Wound Care. Pt is active with [facility] and CM is Nuris Pierce ([phone]). Per Pt, he no longer has HHA, as he has a bunch of hours, but no HHA to help fill those hours d/t shortage. CM left a  for [place] to call CM to try to help pt get HHA to help pt at home. Currently pt's daughter and mother are helping pt at home. Pt's daughter is in college, taking classes from home. Pt's ex-spouse, who is a RN, lives down the street, but works full time. Pt's mother is living with pt to help him out at home. Pt was on Eliquis prior to the admission.

## 2021-09-06 NOTE — ACP (ADVANCE CARE PLANNING)
Advance Care Planning   Healthcare Decision Maker:    Primary Decision Maker: Marylen Bonnet Child - [phone]    Primary Decision Maker: Lenora Almeida - Spouse - [phone]    Click here to complete Healthcare Decision Makers including selection of the Healthcare Decision Maker Relationship (ie \"Primary\").          \plain
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
## 2021-09-06 NOTE — FLOWSHEET NOTE
09/05/21 2056   Vitals   Temp 97.4 °F (36.3 °C)   Temp Source Oral   Pulse 63   Heart Rate Source Monitor   Resp 18   BP 98/62   BP Location Right upper arm   BP Upper/Lower Upper   BP Method Automatic   Patient Position Semi fowlers   Level of Consciousness Alert (0)   MEWS Score 2   Patient Currently in Pain Denies   Oxygen Therapy   SpO2 93 %   O2 Device None (Room air)   Shift assessment completed-see flow sheet. Patient in bed awake,alert and oriented x4. Vitals stable. Bear hugger turned off at this time,temp stable 97.4. Patient denies any pain, states \"he feels tired\". Evening medications given per order. Patient denies any needs at this time,will continue to monitor,call light within reach.

## 2021-09-06 NOTE — PROGRESS NOTES
Patient in bed awake, denies any needs at this time. No issues noted,will continue to monitor,call light within reach.

## 2021-09-06 NOTE — PROGRESS NOTES
RESPIRATORY THERAPY ASSESSMENT    Name:  [facility] Record Number:  [id number]  Age: 50 y.o. Gender: male  : 1965  Today's Date:  2021  Room:  /0303-01    Assessment     Is the patient being admitted for a COPD or Asthma exacerbation? No   (If yes the patient will be seen every 4 hours for the first 24 hours and then reassessed)    Patient Admission Diagnosis      Allergies  Allergies   Allergen Reactions    Benadryl [Diphenhydramine Hcl] Anaphylaxis     Throat swelling    Keflex [Cephalexin] Anaphylaxis    Statins      muscle aches     Cephalexin Rash     Hives     Morphine Anxiety     Hallucinations     Penicillins Rash     Hives     Sulfa Antibiotics Rash       Minimum Predicted Vital Capacity:               Actual Vital Capacity:                    Pulmonary History:COPD  Home Oxygen Therapy:  room air  Home Respiratory Therapy:Albuterol and breo   Current Respiratory Therapy:  symbicort 160 bid, albuterol q6 prn  Treatment Type: MDI  Medications: Budesonide/Formoterol    Respiratory Severity Index(RSI)   Patients with orders for inhalation medications, oxygen, or any therapeutic treatment modality will be placed on Respiratory Protocol. They will be assessed with the first treatment and at least every 72 hours thereafter. The following severity scale will be used to determine frequency of treatment intervention.     Smoking History: Pulmonary Disease or Smoking History, Greater than 15 pack year = 2    Social History  Social History     Tobacco Use    Smoking status: Never Smoker    Smokeless tobacco: Never Used   Vaping Use    Vaping Use: Never used   Substance Use Topics    Alcohol use: No    Drug use: No       Recent Surgical History: None = 0  Past Surgical History  Past Surgical History:   Procedure Laterality Date    ABDOMEN SURGERY      BACK SURGERY      T6-T11 hardware    CARDIAC CATHETERIZATION  10/2017    3 stents placed    CENTRAL VENOUS CATHETER  OTHER SURGICAL HISTORY Right 10/13/2016    EXCISION INFECTED BONE AND TISSUE RIGHT FOOT    OTHER SURGICAL HISTORY Left 02/02/2017    debridement infected tissue left foot    OTHER SURGICAL HISTORY Left 05/25/2017    ULCER DEBRIDEMENT LEFT FOOT     OTHER SURGICAL HISTORY Left 05/10/2018    FIBULAR OSTEOTOMY LEFT LOWER LEG, DEBRIDEMENT OF MULTIPLE    OTHER SURGICAL HISTORY Left 05/15/2018    INCISION AND DRAINAGE WITH RESECTION OF NECROTIC BONE AND TISSUE, DELAYED PRIMARY CLOSURE LEFT/LEG FOOT    OTHER SURGICAL HISTORY Right 07/26/2018    Amputation third and forth ray, fifth toe, debridement of multiple compartments including bone with removal of cuboid and lateral cuneiform, bone biopsy of cuboid and base of third ray (Right )    OTHER SURGICAL HISTORY  07/24/2020    phacoemulsification of cataract with intraocular lens implant right eye    PA AMPUTATION METATARSAL+TOE,SINGLE Right 07/26/2018    Amputation third and forth ray, fifth toe, debridement of multiple compartments including bone with removal of cuboid and lateral cuneiform, bone biopsy of cuboid and base of third ray performed by Star Guzmán DPM at [address] [facility] T/A/L AREA/<100SCM /<1ST 25 SCM Right 13/73/7407    APPLICATION GRAFT FOREFOOT, SURGICAL PREPARATION OF WOUND BED, APPLICATION GRAFT RIGHT HEEL, APPLICATION NEGATIVE PRESSURE DRESSING WITH APPLICATION BELOW KNEE SPLINT performed by Star Guzmán DPM at [address] [facility],HEAD,FAC,HAND,FEET <100SQCM Bilateral 07/30/2018    INCISION AND DRAINAGE WITH DELAYED PRIMARY CLOSURE, RIGHT FOOT, SPLIT THICKNESS SKIN GRAFT, SPLIT THICKNESS SKIN GRAFT, LEFT HEEL, APPLICATION OF TOTAL CONTACT CAST, BILATERAL,  APPLICATION OF WOUND VAC DRESSING, BILATERAL HEEL, MULTIPLE FOOT WOUNDS BILATERAL performed by Star Guzmán DPM at [address] [facility]      rotator cuff, torn bicep    TUNNELED VENOUS PORT PLACEMENT Right 01/17/2019    UPPER GASTROINTESTINAL ENDOSCOPY N/A 02/03/2021    EGD W/ANES. (9:30) PT IMMOBILE performed by Nini Abrams MD at [address]  02/03/2021    EGD DILATION SAVORY performed by Nini Abrams MD at [facility]  2013    Removed after 3 months       Level of Consciousness: Alert, Oriented, and Cooperative = 0    Level of Activity: Bedridden, unresponsive or quadriplegic = 4    Respiratory Pattern: Regular Pattern; RR 8-20 = 0    Breath Sounds: Clear = 0    Sputum   ,  ,    Cough: Strong, spontaneous, non-productive = 0    Vital Signs   BP 92/60   Pulse 65   Temp 97.7 °F (36.5 °C) (Oral)   Resp 18   Ht 6' 2\" (1.88 m)   Wt 250 lb (113.4 kg)   SpO2 94%   BMI 32.10 kg/m²   SPO2 (COPD values may differ): Greater than or equal to 92% on room air = 0    Peak Flow (asthma only): not applicable = 0    RSI: 0-4 = See once and convert to home regimen or discontinue and 5-6 = Q4hr PRN (every four hours as needed) for dyspnea        Plan       Goals: mobilize retained secretions, volume expansion and improve oxygenation    Patient/caregiver was educated on the proper method of use for Respiratory Care Devices:  Yes      Level of patient/caregiver understanding able to:   ? Verbalize understanding   ? Demonstrate understanding       ? Teach back        ? Needs reinforcement       ? No available caregiver               ? Other:     Response to education:  Good     Is patient being placed on Home Treatment Regimen? Yes     Does the patient have everything they need prior to discharge? NA     Comments: pt assessed and chart reviewed    Plan of Care: symbicort 160 bid, albuterol q4 prn    Electronically signed by Kb Dang RCP on 9/6/2021 at 3:43 AM    Respiratory Protocol Guidelines     1.  Assessment and treatment by Respiratory Therapy will be initiated for medication and therapeutic interventions upon initiation of aerosolized medication. 2. Physician will be contacted for respiratory rate (RR) greater than 35 breaths per minute. Therapy will be held for heart rate (HR) greater than 140 beats per minute, pending direction from physician. 3. Bronchodilators will be administered via Metered Dose Inhaler (MDI) with spacer when the following criteria are met:  a. Alert and cooperative     b. HR < 140 bpm  c. RR < 30 bpm                d. Can demonstrate a 2-3 second inspiratory hold  4. Bronchodilators will be administered via Hand Held Nebulizer [facility]) to patients when ANY of the following criteria are met  a. Incognizant or uncooperative          b. Patients treated with HHN at Home        c. Unable to demonstrate proper use of MDI with spacer     d. RR > 30 bpm   5. Bronchodilators will be delivered via Metered Dose Inhaler (MDI), HHN, Aerogen to intubated patients on mechanical ventilation. 6. Inhalation medication orders will be delivered and/or substituted as outlined below. Aerosolized Medications Ordering and Administration Guidelines:    1. All Medications will be ordered by a physician, and their frequency and/or modality will be adjusted as defined by the patients Respiratory Severity Index (RSI) score. 2. If the patient does not have documented COPD, consider discontinuing anticholinergics when RSI is less than 9.  3. If the bronchospasm worsens (increased RSI), then the bronchodilator frequency can be increased to a maximum of every 4 hours. If greater than every 4 hours is required, the physician will be contacted. 4. If the bronchospasm improves, the frequency of the bronchodilator can be decreased, based on the patient's RSI, but not less than home treatment regimen frequency. 5. Bronchodilator(s) will be discontinued if patient has a RSI less than 9 and has received no scheduled or as needed treatment for 72  Hrs. Patients Ordered on a Mucolytic Agent:    1.  Must always be administered with a bronchodilator. 2. Discontinue if patient experiences worsened bronchospasm, or secretions have lessened to the point that the patient is able to clear them with a cough. Anti-inflammatory and Combination Medications:    1. If the patient lacks prior history of lung disease, is not using inhaled anti-inflammatory medication at home, and lacks wheezing by examination or by history for at least 24 hours, contact physician for possible discontinuation.
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
## 2021-09-06 NOTE — CONSULTS
9-6-21 (1151) vancomycin trough 17.3. Please give vancomycin 500 mg ivpb x1 dose at 2300 on 9-6-21. Please recheck vancomycin trough 9-7-21 at 2300 and pulse dose vancomycin per pharmacy protocol. [facility]. Ph.  9/6/2021 2:42 PM

## 2021-09-06 NOTE — PROGRESS NOTES
Shift assessment complete- see flow sheet. Patient is A/Ox4. VSS. Improved from yesterday. Warm blanket applied for T 96.6 po. Temp was fine overnight. Morning medications given without difficulty. Currently on room air, SpO2 WNL. Lung sounds diminished. Denies any complaints. Reports feeling better than yesterday. Patient denies any further needs. Call light explained and in reach. Bed alarm on. Will continue to monitor.

## 2021-09-07 PROBLEM — R11.0 NAUSEA: Status: ACTIVE | Noted: 2021-01-01

## 2021-09-07 PROBLEM — T68.XXXA HYPOTHERMIA: Status: ACTIVE | Noted: 2021-01-01
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
## 2021-09-07 NOTE — PROGRESS NOTES
Patient weight obtained per bed after lifting patient off bed, only items on bed 1 sheet, 1 blanket, 1 pillow, call light, 1 dry flow pad and removing the dolphin pump off the end of the bed prior to zeroing the bed. Patient weight after zeroing was 301.8 lbs. Dressings to back, left knee, under bilateral breasts, right upper quadrant dressing all changed per new orders. Patient tolerated well. IVF continues w/ NaHCO3 at 100ml/hr. Pain free, family visitor at bedside, call light in patients reach.
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
## 2021-09-07 NOTE — PROGRESS NOTES
Pt moved via lift to reposition and zero out bed. Pt weighed with one pillow, blanket, top sheet, fitted sheet and pad on bed. Bed was zeroed with mattress pump on end of bed.

## 2021-09-07 NOTE — CONSULTS
Pt scheduled for lap cesar today with Dr. Pacheco Magaña. Pt is known to our group. Pt admitted on 9/5/21 with reports of nausea. The patient states he \"got scared\" it was his gallbladder again and came to the Er for evaluation. In the ER, a CT of the abdomen and pelvis was performed and demonstrated no acute intraabdominal findings but, revealed diffuse anasarca. LFTs were unremarkable. The patient reports nausea without vomiting. He was admitted with SUDHA, Sepsis of unknown origin, CHF. Pt with neurogenic bladder: straight caths at home, UA with large leuks, >100, blood, cloudy. ABD: obese, + distention, no N/V, ostomy with stool in bag, no tenderness. LFTs unremarkable  Leuks normal    Plan: Pt can follow-up with Dr. Pacheco Magaña in the office once discharged to determine timing of outpatient lap cesar. This was discussed with the patient at the bedside and he agreed with the plan of care. Will sign off.

## 2021-09-07 NOTE — PROGRESS NOTES
Perfect serve sent to Dr. Sonal Doll that pt was admitted for sepsis.  Pt has a scheduled gall bladder removal tomorrow AM. Cory Iyer RN

## 2021-09-07 NOTE — CARE COORDINATION
INTERDISCIPLINARY PLAN OF CARE CONFERENCE    Date/Time: 9/7/2021 11:52 AM  Completed by: Nelly Mathias RN, Case Management      Patient Name:  Chastity Buckley  YOB: 1965  Admitting Diagnosis: Anasarca [R60.1]  Hyponatremia [E87.1]  Acute UTI [N39.0]  SUDHA (acute kidney injury) ([facility] [N17.9]  CHF (congestive heart failure), NYHA class I, acute on chronic, combined ([facility] [I50.43]  Acute sepsis ([facility] [A41.9]     Admit Date/Time:  9/5/2021 12:17 PM    Chart reviewed. Interdisciplinary team contacted or reviewed plan related to patient progress and discharge plans. Disciplines included Case Management, Nursing, and Dietitian. Current Status:ongoing  PT/OT recommendation for discharge plan of care: n/a    Expected D/C Disposition:  Home  Confirmed plan with patient and/or family Yes confirmed with: (name) pt  Met with:pt  Discharge Plan Comments: Reviewed chart. Role of discharge planner explained and patient verbalized understanding. Pt is from home with daughter and mother and plans to return. Pt is a paraplegic. Pt will need ambulance transport home and [facility] (and refuses [place] [id number] ambulance). [facility] for [facility] only on MWF---will add additional services upon d/c. Consult placed for Dr. Graeme Dockery for Wound Care, as pt sees Dr. Graeme Dockery. Pt is active with [facility] ([facility]) and his CM is Alix Black, as CM LM for her at [phone]. Pt currently has no HHA seeing him d/t no HHA available. Pt was Eliquis prior to admission.      Home O2 in place on admit: No  Pt informed of need to bring portable home O2 tank on day of discharge for nursing to connect prior to leaving:  Not Indicated  Verbalized agreement/Understanding:  Not Indicated

## 2021-09-07 NOTE — PLAN OF CARE
Problem: Infection:  Goal: Will remain free from infection  Description: Will remain free from infection  9/7/2021 1844 by Alan Ledbetter RN  Outcome: Ongoing  Note: afebrile  9/7/2021 0645 by Camille Abraham RN  Outcome: Ongoing     Problem: Safety:  Goal: Free from accidental physical injury  Description: Free from accidental physical injury  9/7/2021 1844 by Alan Ledbetter RN  Outcome: Ongoing  Note: Free from falls this shift  9/7/2021 0645 by Camille Abraham RN  Outcome: Ongoing  Goal: Free from intentional harm  Description: Free from intentional harm  9/7/2021 1844 by Alan Ledbetter RN  Outcome: Ongoing  9/7/2021 0645 by Camille Abraham RN  Outcome: Ongoing     Problem: Daily Care:  Goal: Daily care needs are met  Description: Daily care needs are met  9/7/2021 1844 by Alan Ledbetter RN  Outcome: Ongoing  9/7/2021 0645 by Camille Abraham RN  Outcome: Ongoing     Problem: Pain:  Goal: Patient's pain/discomfort is manageable  Description: Patient's pain/discomfort is manageable  9/7/2021 1844 by Alan Ledbetter RN  Outcome: Ongoing  Note: Denying complaints of pain needing managed  9/7/2021 0645 by Camille Abraham RN  Outcome: Ongoing     Problem: Skin Integrity:  Goal: Skin integrity will stabilize  Description: Skin integrity will stabilize  9/7/2021 1844 by Alan Ledbetter RN  Outcome: Ongoing  Note: No new skin breakdown noted  9/7/2021 0645 by Camille Abraham RN  Outcome: Ongoing     Problem: Discharge Planning:  Goal: Patients continuum of care needs are met  Description: Patients continuum of care needs are met  9/7/2021 1844 by Alan Ledbetter RN  Outcome: Ongoing  9/7/2021 0645 by Camille Abraham RN  Outcome: Ongoing     Problem: Skin Integrity:  Goal: Will show no infection signs and symptoms  Description: Will show no infection signs and symptoms  9/7/2021 1844 by Alan Ledbetter RN  Outcome: Ongoing  9/7/2021 0645 by Camille Abraham RN  Outcome: Ongoing  Goal: Absence of new skin breakdown  Description: Absence of new skin breakdown  9/7/2021 1844 by Marta Baig RN  Outcome: Ongoing  9/7/2021 0645 by Kasia Vincent RN  Outcome: Ongoing

## 2021-09-07 NOTE — PROGRESS NOTES
Pt resting in bed watching TV. He denies any pain or SOB @ this time. Assessment complete-see flowsheet. Medications given-see MAR. Pt denies further needs, call light and bedside table within reach. Will continue to monitor.

## 2021-09-08 NOTE — PROGRESS NOTES
Bedside shift report given to [address]  transferred. Call light in patient's reach.   Denying needs

## 2021-09-08 NOTE — CARE COORDINATION
INTERDISCIPLINARY PLAN OF CARE CONFERENCE    Date/Time: 9/8/2021 10:51 AM  Completed by: Ibeth Bejarano RN, Case Management      Patient Name:  Charlene Walker  YOB: 1965  Admitting Diagnosis: Anasarca [R60.1]  Hyponatremia [E87.1]  Acute UTI [N39.0]  SUDHA (acute kidney injury) ([facility] [N17.9]  CHF (congestive heart failure), NYHA class I, acute on chronic, combined ([facility] [I50.43]  Acute sepsis ([facility] [A41.9]     Admit Date/Time:  9/5/2021 12:17 PM    Chart reviewed. Interdisciplinary team contacted or reviewed plan related to patient progress and discharge plans. Disciplines included Case Management, Nursing, and Dietitian. Current Status:ongoing  PT/OT recommendation for discharge plan of care: n/a    Expected D/C Disposition:  Home  Confirmed plan with patient and/or family Yes confirmed with: (name) pt  Met with:pt  Discharge Plan Comments: Reviewed chart. Role of discharge planner explained and patient verbalized understanding. Pt lives in a trilevel home with daughter and mother nad plans to return. Pt is a paraplegic.     Pt will need ambulance transport home and [facility] (and refuses [facility] ambulance).     Pt is active with [facility]. for SN only on MWF---will add additional services upon d/c.      Consult placed for Dr. Vivi Shaw for Wound Care, as pt sees Dr. Vivi Shaw.     Pt is [facility] with [facility] and his CM is Obed Pedroza, as CM LM for her at [phone]. Pt currently has no HHA seeing him d/t no HHA available.        Pt was Eliquis prior to admission.          Home O2 in place on admit: No  Pt informed of need to bring portable home O2 tank on day of discharge for nursing to connect prior to leaving:  Not Indicated  Verbalized agreement/Understanding:  Not Indicated

## 2021-09-08 NOTE — PLAN OF CARE
Problem: Infection:  Goal: Will remain free from infection  Description: Will remain free from infection  9/8/2021 1700 by Jad Koenig RN  Note: Patient afebrile  9/8/2021 0801 by Lenard Jama RN  Outcome: Ongoing     Problem: Safety:  Goal: Free from accidental physical injury  Description: Free from accidental physical injury  9/8/2021 1700 by Jad Koenig RN  Note: Free of falls this shift  9/8/2021 0801 by Lenard Jama RN  Outcome: Ongoing  Goal: Free from intentional harm  Description: Free from intentional harm  9/8/2021 1700 by Jad Koenig RN  Note: Free of falls this shift  9/8/2021 0801 by Lenard Jama RN  Outcome: Ongoing

## 2021-09-08 NOTE — PROGRESS NOTES
[facility] Progress Note           Subjective/   50y.o. year old male who we are seeing in consultation for SUDHA. HPI:  Renal function improving with IVF's, urine output of 2.5 liters over last 24 hours. Denies any shortness of breath. ROS:  Intake better, +weak. BP trend slowly improving. Objective/   GEN:  Chronically ill, /73   Pulse 89   Temp 97.7 °F (36.5 °C) (Oral)   Resp 16   Ht 6' 2\" (1.88 m)   Wt (!) 303 lb 4 oz (137.6 kg)   SpO2 90%   BMI 38.93 kg/m²   HEENT: non-icteric, no JVD  CV: S1, S2 without m/r/g; ++ LE edema  RESP: CTA B without w/r/r; breathing wnl  ABD: +bs, soft, nt, no hsm  SKIN: warm, no rashes    Data/  Recent Labs     09/06/21  0640 09/07/21  0422 09/08/21  0607   WBC 9.0 9.3 8.9   HGB 12.3* 12.0* 12.0*   HCT 38.6* 37.6* 37.4*   MCV 86.6 85.9 87.1    see below 155     Recent Labs     09/06/21  0432 09/07/21  0422 09/08/21  0442   * 128* 128*   K 4.5 4.2 3.9   CL 91* 92* 91*   CO2 19* 19* 22   GLUCOSE 83 137* 62*   PHOS 6.4* 5.7* 4.9   MG 1.90 1.90 1.90   BUN 97* 98* 91*   CREATININE 1.9* 1.9* 1.6*   LABGLOM 37* 37* 45*   GFRAA 45* 45* 55*       Assessment/     -SUDHA on CKD stage III setting of decreased oral intake, abdomen pain and nausea along with Entresto and torsemide 80 mg daily. Previously on HD, off since 8/14/21     -Hyperuricemia - started on allopurinol     -Hyponatremia likely intravascular volume depletion     -Metabolic acidosis from SUDHA on CKD     -Chronic CHF with reduced EF     -Neurogenic bladder, patient self catheterizes every 4 hours at home on Delgado catheter here    Plan/     - Hold on further IVF's  - Plans not to resume entresto  - Can resume torsemide 80 mg a day (home dose) once intake better   - Okay to resume metoprolol XL at lower dose of 50 mg a day  - Trend labs, bp's, & urine output    Okay for discharge tomorrow  Case d/w Catarino  [address] [place] [address]    ____________________________________  Guy Smith
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
## 2021-09-08 NOTE — PROGRESS NOTES
Pt restin gin bed watching TV. He c/o pain with headache again tonight, PRN APAP given per [facility]. Assessment complete-see flowsheet. Medications given-see MAR. Pt denies further needs, call light and bedside table within reach. Will continue to monitor.
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
## 2021-09-08 NOTE — PROGRESS NOTES
Patient assessment as per flowsheet  -  Alert and oriented. Denying complaints of discomfort or nausea. Tolerating po intake.

## 2021-09-08 NOTE — PLAN OF CARE
Problem: Infection:  Goal: Will remain free from infection  Description: Will remain free from infection  9/8/2021 0801 by Carol Luo RN  Outcome: Ongoing  9/7/2021 1844 by Erik Dunaway RN  Outcome: Ongoing  Note: afebrile     Problem: Safety:  Goal: Free from accidental physical injury  Description: Free from accidental physical injury  9/8/2021 0801 by Carol Luo RN  Outcome: Ongoing  9/7/2021 1844 by Erik Dunaway RN  Outcome: Ongoing  Note: Free from falls this shift  Goal: Free from intentional harm  Description: Free from intentional harm  9/8/2021 0801 by Carol Luo RN  Outcome: Ongoing  9/7/2021 1844 by Erik Dunaway RN  Outcome: Ongoing     Problem: Daily Care:  Goal: Daily care needs are met  Description: Daily care needs are met  9/8/2021 0801 by Carol Luo RN  Outcome: Ongoing  9/7/2021 1844 by Erik Dunaway RN  Outcome: Ongoing     Problem: Pain:  Goal: Patient's pain/discomfort is manageable  Description: Patient's pain/discomfort is manageable  9/8/2021 0801 by Carol Luo RN  Outcome: Ongoing  9/7/2021 1844 by Erik Dunaway RN  Outcome: Ongoing  Note: Denying complaints of pain needing managed     Problem: Skin Integrity:  Goal: Skin integrity will stabilize  Description: Skin integrity will stabilize  9/8/2021 0801 by Carol Luo RN  Outcome: Ongoing  9/7/2021 1844 by Erik Dunaway RN  Outcome: Ongoing  Note: No new skin breakdown noted     Problem: Discharge Planning:  Goal: Patients continuum of care needs are met  Description: Patients continuum of care needs are met  9/8/2021 0801 by Carol Luo RN  Outcome: Ongoing  9/7/2021 1844 by Erik Dunaway RN  Outcome: Ongoing     Problem: Skin Integrity:  Goal: Will show no infection signs and symptoms  Description: Will show no infection signs and symptoms  9/8/2021 0801 by Carol Luo RN  Outcome: Ongoing  9/7/2021 1844 by Erik Dunaway RN  Outcome: Ongoing  Goal: Absence of new skin breakdown  Description: Absence of new skin breakdown  9/8/2021 0801 by Lucy Schrader RN  Outcome: Ongoing  9/7/2021 1844 by Tonie Leal RN  Outcome: Ongoing

## 2021-09-09 NOTE — PROGRESS NOTES
Shift assessment complete- see flow sheet. Patient is A/Ox4. VSS. Morning medications given without difficulty. Currently on room air, SpO2 WNL. Lung sounds diminished. Denies shortness of breath. Denies CP. Patient denies any further needs. Call light explained and in reach. Bed alarm on. Will continue to monitor. Dr. Lobo Solis just finished changing wound on R knee.
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
## 2021-09-09 NOTE — PROGRESS NOTES
[facility] Progress Note           Subjective/   50y.o. year old male who we are seeing in consultation for SUDHA. HPI:  Renal function improving with IVF's, now off, urine output of 4.1 liters over last 24 hours. Denies any shortness of breath. ROS:  Intake better, +weak. BP trend better. Objective/   GEN:  Chronically ill, /79   Pulse 90   Temp 98.4 °F (36.9 °C) (Oral)   Resp 16   Ht 6' 2\" (1.88 m)   Wt (!) 302 lb 4.8 oz (137.1 kg)   SpO2 92%   BMI 38.81 kg/m²   HEENT: non-icteric, no JVD  CV: S1, S2 without m/r/g; + LE edema  RESP: CTA B without w/r/r; breathing wnl  ABD: +bs, soft, nt, no hsm  SKIN: warm, no rashes    Data/  Recent Labs     09/07/21 0422 09/08/21  0607 09/09/21  0444   WBC 9.3 8.9 7.7   HGB 12.0* 12.0* 12.5*   HCT 37.6* 37.4* 38.1*   MCV 85.9 87.1 84.7   PLT see below 155 see below     Recent Labs     09/07/21 0422 09/08/21 0442 09/09/21  0444   * 128* 128*   K 4.2 3.9 3.8   CL 92* 91* 92*   CO2 19* 22 23   GLUCOSE 137* 62* 75   PHOS 5.7* 4.9 4.1   MG 1.90 1.90 1.80   BUN 98* 91* 75*   CREATININE 1.9* 1.6* 1.2   LABGLOM 37* 45* >60   GFRAA 45* 55* >60       Assessment/     -SUDHA on CKD stage III setting of decreased oral intake, abdomen pain and nausea along with Entresto and torsemide 80 mg daily.     Previously on HD, off since 8/14/21   Follows with Dr. Quincy Armijo in office     -Hyperuricemia - started on allopurinol     -Hyponatremia likely intravascular volume depletion     -Metabolic acidosis from SUDHA on CKD     -Chronic CHF with reduced EF     -Neurogenic bladder, patient self catheterizes every 4 hours at home on Delgado catheter here    Plan/     - Holding on further IVF's  - Plans not to resume entresto  - Can resume torsemide 40 mg a day (home dose of 80 mg a day) on discharge   - Okay to resume metoprolol XL at lower dose of 50 mg a day  - Trend labs, bp's, & urine output    Okay for discharge today  Repeat labs in one week, f/u with us arranged  Case d/w Catarino Harmon    ____________________________________  Omar Mejia MD  [facility]  [email or web address]  Office: [phone]

## 2021-09-09 NOTE — CARE COORDINATION
INTERDISCIPLINARY PLAN OF CARE CONFERENCE    Date/Time: 9/9/2021 9:45 AM  Completed by: Chris Carbajal RN, Case Management      Patient Name:  Vandana Rowley  YOB: 1965  Admitting Diagnosis: Anasarca [R60.1]  Hyponatremia [E87.1]  Acute UTI [N39.0]  SUDHA (acute kidney injury) ([facility] [address] [N17.9]  CHF (congestive heart failure), NYHA class I, acute on chronic, combined ([facility] [address] [I50.43]  Acute sepsis ([facility] [address] [A41.9]     Admit Date/Time:  9/5/2021 12:17 PM    Chart reviewed. Interdisciplinary team contacted or reviewed plan related to patient progress and discharge plans. Disciplines included Case Management, Nursing, and Dietitian. Current Status:ongoing  PT/OT recommendation for discharge plan of care: n/a    Expected D/C Disposition:  Home  Confirmed plan with patient and/or family Yes confirmed with: (name) pt  Met with:pt  Discharge Plan Comments: Reviewed chart. Role of discharge planner explained and patient verbalized understanding. Role of discharge planner explained and patient verbalized understanding. Pt lives in a trilevel home with daughter and mother nad plans to return.    Pt is a paraplegic.     Pt will need ambulance transport home and requests [facility] (and refuses [facility]).     Pt is [facility] with [facility] for SN only on MWF---will add additional services upon d/c. CM left a VM for [place] with [facility] to ask if she could add HHA for pt because of not having a HHA.      Consult placed for Dr. Zan Sanchez for [address], as pt sees Dr. Zan Sanchez.     Pt is active with [facility] and his CM is Joe Angela, as CM LM for her at [phone].  Pt currently has no HHA seeing him d/t no HHA available.        Pt was Eliquis prior to admission.            Home O2 in place on admit: No  Pt informed of need to bring portable home O2 tank on day of discharge for nursing to connect prior to leaving:  Not Indicated  Verbalized agreement/Understanding:  Not Indicated      DISCHARGE ORDER  Date/Time 2021 12:04 PM  Completed by: Tiffanie Kearney RN, Case Management    Patient Name: Silvia John      : 1965  Admitting Diagnosis: Anasarca [R60.1]  Hyponatremia [E87.1]  Acute UTI [N39.0]  SUDHA (acute kidney injury) ([facility] [address] [N17.9]  CHF (congestive heart failure), NYHA class I, acute on chronic, combined ([facility] [address] [I50.43]  Acute sepsis ([facility] [address] [A41.9]      Admit order Date and Status 2021  (verify MD's last order for status of admission)      Noted discharge order. If applicable PT/OT recommendation at Discharge: n/a  DME recommendation by PT/OT:n/a  Confirmed discharge plan  (pt): Yes  with whom_________pt______  If pt confirmed DC plan does family need to be contacted by CM No if yes who_____pt states that he will call his family_  Discharge Plan: Reviewed chart. Role of discharge planner explained and patient verbalized understanding. Discharge order is noted. Pt is being d/c'd to home today. Pt needs ambulance transport home and wants [facility]. CRUZITO called John with [facility] and he states he will pick pt up at [phone] today. John asked that it not be any later and he be ready on time. Matti Delgado is aware. Due to pt not having HHA through [employer], CRUZITO added HHA to the SN for [facility]. +HC orders, +eCOC. CRUZITO spoke with Emily earlier and he states he will add HA to pt's regimen. CRUZITO called Emily and he knows pt was d/cing today and Emily will pull info from [facility] Cruzito called [place] with [employer] ([phone]) and informed her pt was discharging to home and that [facility] was adding HA to SN. She verbalized understanding. Pt's O2 sats are 92% on RA. No further discharge needs needed or noted. 1240: Pt is ready for [facility] transport when CM checked. .     Reviewed chart. Role of discharge planner explained and patient verbalized understanding. Discharge order is noted.     Has Home O2 in place on admit:  No  Informed of need to bring portable home O2 janneth on day of discharge for nursing to connect prior to leaving:   No  Verbalized agreement/Understanding:   Not Indicated    Discharge timeout done with Abrahan Fraire RN. All discharge needs and concerns addressed. Addendum [phone] Garcia RN informed CM that [facility] had not picked up pt yet. CM called John with [facility] and informed him that pt was ready by 1240pm for the 1245-1300pm, and pt has not been picked up yet. Marie Ring states he was calling his crew. Addendum [phone]; Marie Ring states that the crew is here outside, but having difficulty with the lift equipment and he is taking care of getting that fixed.  Pedro Marroquin

## 2021-09-09 NOTE — PLAN OF CARE
Problem: Infection:  Goal: Will remain free from infection  Description: Will remain free from infection  9/8/2021 2236 by Franck Santos RN  Outcome: Ongoing  9/8/2021 1700 by Marylene Second, RN  Note: Patient afebrile     Problem: Skin Integrity:  Goal: Skin integrity will stabilize  Description: Skin integrity will stabilize  Outcome: Ongoing
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
## 2021-09-09 NOTE — FLOWSHEET NOTE
09/08/21 2054   Vitals   Temp 97.8 °F (36.6 °C)   Temp Source Oral   Pulse 92   Heart Rate Source Monitor   Resp 15   BP (!) 140/82   BP Location Left upper arm   BP Upper/Lower Upper   BP Method Automatic   Patient Position High fowlers   Level of Consciousness Alert (0)   MEWS Score 1   Patient Currently in Pain Denies   Oxygen Therapy   SpO2 92 %   O2 Device None (Room air)   Shift assessment completed- see flow sheet. Patient in bed awake,alert and oriented x4. Vitals stable. Evening medications given per order. Patient denies any discomfort at this time, refused repositioned at this time. Call light within reach and bed in lowest position.

## 2021-09-09 NOTE — FLOWSHEET NOTE
09/09/21 0345   Vitals   Temp 98.4 °F (36.9 °C)   Temp Source Oral   Pulse 90   Heart Rate Source Monitor   Resp 16   /79   BP Location Left upper arm   BP Upper/Lower Upper   BP Method Automatic   Patient Position Semi fowlers   Level of Consciousness Alert (0)   MEWS Score 1   Patient Currently in Pain Denies   Height and Weight   Weight (!) 302 lb 4.8 oz (137.1 kg)   Weight Method Actual;Bed scale   BMI (Calculated) 38.9   Oxygen Therapy   SpO2 92 %   O2 Device None (Room air)   Pt. In bed awake. Vitals stable. Pt denies any needs at this time,call light within reach.

## 2021-09-10 NOTE — CARE COORDINATION
[facility] Transitions Initial Follow Up Call    Call within 2 business days of discharge: Yes    Patient: Jennifer Tristan Patient : 1965   MRN: [id number]  Reason for Admission: acute sepsis, bacteruria with pyuria, anasarca, CHF, SUDHA, choledocholithiasis, hyponatremia, nausea, hypothermia, acute UTI, hyperuricemia, massive fluid overload, DM2, prio hx PE (on Eliquis), paraplegia  Discharge Date: 21 RARS: Readmission Risk Score: 42      Last Discharge [address]       Complaint Diagnosis Description Type Department Provider    21 Abdominal Pain Acute sepsis ([facility] [address] . .. ED to Hosp-Admission (Discharged) (ADMITTED) [address] Leida Oliveira MD; Andrzej Davis . .. Was this an external facility discharge? No Discharge Facility:     COVID-19 and Influenza A&B Not Detected on 2021.    1st attempt - unable to reach or leave message - \"The wireless customer you are calling is not available. \" Outreach to [facility]. who reports GREYSON scheduled for today.        Follow Up  Future Appointments   Date Time Provider Alyson Cardona   2021 10:15 AM MD Adrianna Mayers [facility]   2021 10:15 AM MD Jeannette Mendieta [facility]   2021  3:00 PM Zachariah Quintanilla MD P CLER CAR [facility]   10/8/2021 10:15 AM MD Adrianna Mayers [facility]   10/29/2021  3:20 PM Colin Pérez MD [facility] None   2021  2:45 PM Zachariah Quintanilla MD P CLER CAR [facility]       Lavonne Lobato RN

## 2021-09-13 NOTE — CARE COORDINATION
[facility] Transitions Initial Follow Up Call    Call within 2 business days of discharge: Yes    Patient: Annalisa العلي Patient : 1965   MRN: [id number]  Reason for Admission: acute sepsis, bacteruria with pyuria, anasarca, CHF, SUDHA, choledocholithiasis, hyponatremia, nausea, hypothermia, acute UTI, hyperuricemia, massive fluid overload, DM2, prio hx PE (on Eliquis), paraplegia  Discharge Date: 21 RARS: Readmission Risk Score: 42      Last Discharge [address]       Complaint Diagnosis Description Type Department Provider    21 Abdominal Pain Acute sepsis ([facility] [address] . .. ED to Hosp-Admission (Discharged) (ADMITTED) [facility] PCU Reg Fajardo MD; Evette Wang . .. Spoke with: Annalisa العلي (patient)    Facility: [facility]    Non-face-to-face services provided:  Obtained and reviewed discharge summary and/or continuity of care documents  Education of patient/family/caregiver/guardian to support self-management-   Assessment and support for treatment adherence and medication management-1111F completed    Was this an external facility discharge? No Discharge Facility: NA    Challenges to be reviewed by the provider   Additional needs identified to be addressed with provider Yes  medications-states he was to start allopurinol for elevated uric acid levels and he has already contacted Dr Cr Vincent office about this and is awaiting return call       Method of communication with provider : patient contacted MD by phone    Advance Care Planning:   Does patient have an Advance Directive:  not on file. Was this a readmission? No  Patient stated reason for admission: nausea  Patients top risk factors for readmission: functional physical ability, medical condition and utilization of services    Care Transition Nurse (CTN) contacted the patient by telephone to perform post hospital discharge assessment. Provided introduction to self, and explanation of the CTN role.      CTN reviewed discharge instructions, medical action plan and red flags with patient who verbalized understanding. Patient given an opportunity to ask questions and does not have any further questions or concerns at this time. Were discharge instructions available to patient? Yes. Reviewed appropriate site of care based on symptoms and resources available to patient including: PCP, Specialist and Home health. The patient agrees to contact the PCP office for questions related to their healthcare. Medication reconciliation was performed with patient, who verbalizes understanding of administration of home medications. COVID Risk Education    COVID-19 and Influenza A&B Not Detected on 9/5/2021. Educated patient about risk for severe COVID-19 due to risk factors according to CDC guidelines. Discussed COVID vaccination status Yes. Education provided on COVID-19 vaccination as appropriate. Discussed exposure protocols and quarantine with CDC Guidelines. Patient was given an opportunity to verbalize any questions and concerns and agrees to contact CTN or health care provider for questions related to their healthcare. Was patient discharged with a pulse oximeter? No     Reports doing well. GREYSON completed on Friday by home care and expects again today. Has surgery tomorrow to remove gall bladder. He stopped Eliquis x 3 days prior to surgery. States he was to have allopurinol at discharge because of elevated Uric Acid levels. He has already contacted Dr Kinsey Torres office regarding this. Has appt with Kinsey Torres on 9/23 as well. he will be tested for COVID via rapid test prior to surgery tomorrow. He feels well today other than some abd discomfort which is baseline since needing gall bladder surgery. He denies needs today. CTN provided contact information for future needs. Plan for follow-up call in 7-14 days based on severity of symptoms and risk factors.       Care Transitions 24 Hour Call    Schedule Follow Up Appointment with PCP: Completed  Do you have any ongoing symptoms?: No  Do you have a copy of your discharge instructions?: Yes  Do you have all of your prescriptions and are they filled?: Yes  Have you been contacted by a [address] [place] [address] Avenue?: No  Have you scheduled your follow up appointment?: Yes  How are you going to get to your appointment?: Other  Were you discharged with any Home Care or Post Acute Services: Yes  Post Acute Services: Home Health, Outpatient/Community Services, Transportation Services, Other (Comment: [facility], wound care center (Dr Katrin Erazo), Waiver program [facility] (when staff allows))  Patient DME: Wheelchair, Hospital bed, Shower chair, Other  Other Patient DME: prateek lift  Patient Home Equipment: CPAP  Do you have support at home?: Partner/Spouse/SO, Child  Do you feel like you have everything you need to keep you well at home?: Yes  Are you an active caregiver in your home?: Yes  Care Transitions Interventions  No Identified Needs         Follow Up  Future Appointments   Date Time Provider Alyson Cardona   9/17/2021 10:15 AM MD Ever Lemus [facility]   9/23/2021 10:15 AM Mulugeta Vallejo MD CLERM P[facility]IOD[facility]   9/30/2021  3:00 PM Doc Calabrese MD [facility] CLER CAR [facility]   10/8/2021 10:15 AM MD Ever Lemus [facility]   10/29/2021  3:20 PM Isauro Larios MD [facility] None   11/18/2021  2:45 PM Doc Calabrese MD [facility] CLER CAR [facility]       Ariel Huerta RN

## 2021-09-14 NOTE — BRIEF OP NOTE
Brief Postoperative Note      Patient: Darnell Wheat  YOB: 1965  MRN: [id number]    Date of Procedure: 9/14/2021    Pre-Op Diagnosis: CHOLEDOCHOLITHIASIS    Post-Op Diagnosis: Same       Procedure(s):  EXPLORATORY LAPAROSCOPY    Surgeon(s):  Pallavi Sosa MD    Assistant:  Surgical Assistant: Hang Fall    Anesthesia: General    Estimated Blood Loss (mL): Minimal    Complications: None    Specimens:   * No specimens in log *    Implants:  * No implants in log *      Drains:   Closed/Suction Drain Right Abdomen Bulb 8 Danish (Active)       Colostomy LUQ Transverse (Active)   Stomal Appliance 1 piece 09/06/21 1836   Stoma  Assessment Protrudes;Pink 09/09/21 0951   Peristomal Assessment Red 09/08/21 2054   Treatment Bag change 09/06/21 1836   Stool Appearance Formed 09/07/21 2230   Stool Color Sumit;Brown 09/07/21 2230   Stool Amount Medium 09/07/21 2230   Output (mL) 0 ml 09/09/21 0951       Colostomy LUQ (Active)       Urethral Catheter Straight-tip 16 fr (Active)       [REMOVED] Urethral Catheter Temperature probe (Removed)   Catheter Indications Need for fluid volume management of the critically ill patient in a critical care setting 09/08/21 2054   Site Assessment Pink 09/09/21 1228   Urine Color Yellow 09/09/21 1228   Urine Appearance Clear 09/09/21 1228   Output (mL) 925 mL 09/09/21 1228       Findings: Profound inflammation RUQ. Aborted cholecystectomy.     Electronically signed by Mitul Rivera MD on 9/14/2021 at 12:02 PM

## 2021-09-14 NOTE — PROGRESS NOTES
Open areas on buttocks & spine,  Dressings under BL breast, scratches on left arm, scabs on  Left leg, 2 dressings on left leg .

## 2021-09-14 NOTE — H&P
[facility] GENERAL SURGERY      The H&P was reviewed, the patient was examined, and no change has occurred in the patient's condition since the H&P was completed. The indications for the procedure were reviewed, and any questions were answered. I updated the progress note from 9/5/2021 from Dr. Jean Claude Jensen which is the H&P.     Vitals:    09/14/21 0816   BP: 132/77   Pulse: 83   Resp: 16   Temp: 97.1 °F (36.2 °C)   SpO2: 95%
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
## 2021-09-14 NOTE — ANESTHESIA POSTPROCEDURE EVALUATION
Department of Anesthesiology  Postprocedure Note    Patient: Jessica Posadas  MRN: [id number]  YOB: 1965  Date of evaluation: 9/14/2021  Time:  3:28 PM     Procedure Summary     Date: 09/14/21 Room / Location: [place] [address] / [facility]    Anesthesia Start: 8217 Anesthesia Stop: 3705    Procedure: EXPLORATORY LAPAROSCOPY (N/A Abdomen) Diagnosis: (CHOLEDOCHOLITHIASIS)    Surgeons: Naomie Marmolejo MD Responsible Provider: Vladimir Garcia MD    Anesthesia Type: general, regional ASA Status: 3          Anesthesia Type: general, regional    Cleve Phase I: Cleve Score: 3    Cleve Phase II: Cleve Score: 10    Last vitals: Reviewed and per EMR flowsheets. Anesthesia Post Evaluation    Patient location during evaluation: PACU  Patient participation: complete - patient participated  Level of consciousness: awake and alert  Airway patency: patent  Nausea & Vomiting: no nausea and no vomiting  Complications: no  Cardiovascular status: blood pressure returned to baseline  Respiratory status: acceptable  Hydration status: euvolemic  Comments: VSS on transfer to phase 2 recovery. No anesthetic complications.

## 2021-09-15 NOTE — CARE COORDINATION
[facility] Transitions Follow Up Call    9/15/2021    Patient: Faisal Valdez  Patient : 1965   MRN: [id number]  Reason for Admission: acute sepsis, bacteruria with pyuria, anasarca, CHF, SUDHA, choledocholithiasis, hyponatremia, nausea, hypothermia, acute UTI, hyperuricemia, massive fluid overload, DM2, prio hx PE (on Eliquis), paraplegia  Discharge Date: 21 RARS: Readmission Risk Score: 43         Spoke with: Faisal Valdez (patient)    Today is POD1 s/p exploratory laparoscopy. Was unable to have gall bladder removed as planned because he would need a more complicated procedure that has greater risk than benefit at this time. To monitor for fever, jaundice. Slight headache today, tolerating PO. Denies needs. Will f/up with Chantale in 2 weeks - appt TBD. CTN following. Care Transitions Subsequent and Final Call    Schedule Follow Up Appointment with PCP: Completed  Subsequent and Final Calls  Do you have any ongoing symptoms?: No  Have your medications changed?: No  Do you have any questions related to your medications?: No  Do you currently have any active services?: Yes  Are you currently active with any services?: Home Health, Outpatient/Community Services, Transportation Services, Other  Do you have any needs or concerns that I can assist you with?: No  Identified Barriers: Impairment  Care Transitions Interventions  Other Interventions:            Follow Up  Future Appointments   Date Time Provider Alyson Cardona   2021 10:15 AM MD Ivan Solorio [facility]   2021 10:15 AM Belkys Link MD [facility]   2021  3:00 PM Liliana Haney MD [facility]   10/8/2021 10:15 AM MD Ivan Solorio [facility]   10/29/2021  3:20 PM Duncan Villegas MD [facility]   2021  2:45 PM Liliana Haney MD [facility]       Tan Whitt RN

## 2021-09-16 NOTE — ED PROVIDER NOTES
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 persistent diffuse bladder wall   thickening which may just be due to poor distention.   Recommend clinical   follow-up      Questionable moderate 3rd spacing of fluid or cellulitis in the subcutaneous   soft tissues throughout the abdomen and pelvis which is more prominent. Probable gynecomastia which is unchanged. Severe degenerative changes in the spine which is most prominent L5-S1 with   extensive erosive changes and soft tissue density anterior to the disc space   which is unchanged. Recommend surgical follow-up. Moderate bibasilar pleural effusions and associated bibasilar atelectasis and   consolidations which is more prominent. XR CHEST PORTABLE   Final Result   Stable mild cardiomegaly and minimal central pulmonary congestion. Hazy bibasilar atelectasis or infiltrates and a questionable small left   pleural effusion which is more apparent. Right Port-A-Cath unchanged in position           CT ABDOMEN PELVIS WO CONTRAST Additional Contrast? None    Result Date: 9/16/2021  EXAMINATION: CT OF THE ABDOMEN AND PELVIS WITHOUT CONTRAST 9/16/2021 10:06 pm TECHNIQUE: CT of the abdomen and pelvis was performed without the administration of intravenous contrast. Multiplanar reformatted images are provided for review. Dose modulation, iterative reconstruction, and/or weight based adjustment of the mA/kV was utilized to reduce the radiation dose to as low as reasonably achievable. COMPARISON: 09/05/2021 HISTORY: ORDERING SYSTEM PROVIDED HISTORY: Abdominal pain TECHNOLOGIST PROVIDED HISTORY: Reason for exam:->Abdominal pain Additional Contrast?->None Decision Support Exception - unselect if not a suspected or confirmed emergency medical condition->Emergency Medical Condition (MA) Reason for Exam: pt c/o not being able to urineate after recent surgery. Acuity: Acute Type of Exam: Initial FINDINGS: Lower Chest: There are moderate bibasilar pleural effusions layering posteriorly with moderate atelectasis and consolidation posteriorly which is more prominent. Organs: There is moderate metallic streak artifact  secondary to surgical fixation devices along the thoracolumbar spine partially obscuring the upper abdomen.   The liver is mildly enlarged with hypertrophy of the caudate and left lobes which is unchanged. The spleen is borderline enlarged and unchanged. There is mild ascites around the spleen extending inferiorly which is more apparent. The adrenals are normal.  The gallbladder is contracted with no gallstones. The bile ducts and pancreas are normal.  The adrenals are normal.  The kidneys are normal size with cortical thinning and scarring throughout which is unchanged. No hydronephrosis or renal stones are seen. The ureters are normal caliber. GI/Bowel: There is no bowel obstruction. The mesentery is unremarkable. The appendix is not well visualized with no pericecal inflammation. There is mild ascites along the lower abdomen. There is a loop colostomy along the left lower quadrant which is unchanged. Pelvis: The bladder is not well distended and there is diffuse mild bladder wall thickening which is unchanged. The prostate gland is top-normal in size. No adenopathy is seen. There is minimal ascites in the pelvis. Peritoneum/Retroperitoneum: There is moderate calcified plaque throughout the aorta which is normal caliber with no aneurysm and no retroperitoneal mass or adenopathy. Bones/Soft Tissues: There is moderate stranding and edema in the subcutaneous soft tissues along the anterior abdominal wall extending laterally and inferiorly. There is moderate parenchymal density along the retroareolar regions which is unchanged. There are severe degenerative changes along the lower lumbar spine with extensive endplate degenerative changes and erosions along L5-S1 extending anteriorly which is unchanged. There is a large well corticated bony protuberance extending along the left iliac wing anteriorly and inferiorly which is unchanged     Loop colostomy along the left lower quadrant which is unchanged with no bowel obstruction.  Metallic streak artifact partially obscuring the upper abdomen which is limiting the exam. Mild ascites in the abdomen and pelvis which is more prominent. Mild chronic liver changes and mild splenomegaly which is unchanged No hydronephrosis or urinary obstruction. Interval removal of the Delgado catheter with persistent diffuse bladder wall thickening which may just be due to poor distention. Recommend clinical follow-up Questionable moderate 3rd spacing of fluid or cellulitis in the subcutaneous soft tissues throughout the abdomen and pelvis which is more prominent. Probable gynecomastia which is unchanged. Severe degenerative changes in the spine which is most prominent L5-S1 with extensive erosive changes and soft tissue density anterior to the disc space which is unchanged. Recommend surgical follow-up. Moderate bibasilar pleural effusions and associated bibasilar atelectasis and consolidations which is more prominent. XR CHEST PORTABLE    Result Date: 9/16/2021  EXAMINATION: ONE XRAY VIEW OF THE CHEST 9/16/2021 8:19 pm COMPARISON: 09/06/2021 HISTORY: ORDERING SYSTEM PROVIDED HISTORY: sob TECHNOLOGIST PROVIDED HISTORY: Reason for exam:->sob Reason for Exam: body edema FINDINGS: The heart is mildly enlarged but unchanged. The pulmonary vessels are slightly ill-defined centrally. The lungs are hypoinflated with some hazy bibasilar opacities and mild blunting of the left costophrenic sulcus which is more apparent. There is a right Port-A-Cath in place which is in good position. There postop changes along the lower thoracic spine. There is a loop recording device along the left chest inferiorly. Stable mild cardiomegaly and minimal central pulmonary congestion. Hazy bibasilar atelectasis or infiltrates and a questionable small left pleural effusion which is more apparent. Right Port-A-Cath unchanged in position       PROCEDURES   Unless otherwise noted below, none     Procedures    CRITICAL CARE TIME   Critical care provided for this patient of which 15 min were spend on critical care and decision making.  0 min spent on procedures. There was imminent failure of an organ system which required critical intervention to prevent clinically significant progression of life threatening deterioration of the patient's condition to the point of disability or death. CONSULTS:  IP CONSULT TO HOSPITALIST      EMERGENCY DEPARTMENT COURSE and DIFFERENTIAL DIAGNOSIS/MDM:   Vitals:    Vitals:    09/16/21 1931 09/16/21 2255 09/17/21 0014   BP: 101/72 110/75 112/72   Pulse: 73 75 77   Resp: 18 16 16   Temp: 97.6 °F (36.4 °C) 98 °F (36.7 °C) 98.3 °F (36.8 °C)   TempSrc: Oral Oral Oral   SpO2: 93% 95% 90%   Weight: 280 lb (127 kg)     Height: 6' 2\" (1.88 m)         Patient was given thefollowing medications:  Medications   vancomycin 1000 mg IVPB in 250 mL D5W addavial (has no administration in time range)   meropenem (MERREM) 1,000 mg in sodium chloride 0.9 % 100 mL IVPB (mini-bag) (1,000 mg IntraVENous New Bag 9/17/21 0102)   promethazine (PHENERGAN) injection 12.5 mg (12.5 mg IntraVENous Given 9/16/21 2058)   0.9 % sodium chloride bolus (0 mLs IntraVENous Stopped 9/17/21 0013)         ED course  Patient presented to the ER for evaluation of decreased urination and swelling. States has only been able to get a tiny bit of urine out of his catheter since Tuesday. After the Delgado was placed here there was about 10 mL yellow urine in the bag. Acute on chronic kidney injury BUN 76 creatinine 2.2 GFR 31. Previous creatinine was 1.2. given 1L NS here. White count elevated 13.6. Lactic acid elevated 3.0. Procalcitonin 0.27. Sodium low 125. Potassium 5.2 hemolyzed. Glucose 57, repeat is 87. Troponin 0.2, stable. CT abdomen pelvis mild abdominal ascites. Chronic liver changes. No hydronephrosis or obstruction. Bladder wall thickening. Questionable third spacing. Bibasilar pleural effusions. Urinalysis showing urinary tract infection vancomycin and Merrem ordered. He will be admitted.   Spoke with Dr. Bethany Carreon accepts the patient for admission and will place orders. FINAL IMPRESSION      1. SUDHA (acute kidney injury) ([facility] [address]    2. Decreased urine output    3. Bacterial urinary tract infection    4. Anasarca    5. Hyponatremia    6. Lactic acidosis    7. Pleural effusion, bilateral    8. Oliguria          DISPOSITION/PLAN   DISPOSITION Admitted    PATIENT REFERREDTO:  No follow-up provider specified.     DISCHARGE MEDICATIONS:  New Prescriptions    No medications on file       DISCONTINUED MEDICATIONS:  Discontinued Medications    No medications on file              (Please note that portions ofthis note were completed with a voice recognition program.  Efforts were made to edit the dictations but occasionally words are mis-transcribed.)    Usama Evans PA-C (electronically signed)           Paulina Melgar PA-C  09/17/21 0115

## 2021-09-16 NOTE — ED TRIAGE NOTES
Arrived via ems from home, had attempted surgery on 9/14, pt reports generalized body edema and no urine output even with straight cath, pt A/0 x4, GCS 15 able to speak in full sentences and provide own medical hx, placed on monitor, continue to assess

## 2021-09-17 PROBLEM — T68.XXXA HYPOTHERMIA: Status: RESOLVED | Noted: 2021-01-01 | Resolved: 2021-01-01

## 2021-09-17 NOTE — ED PROVIDER NOTES
ED Attending Attestation Note    This patient was seen by the [place] practice provider. I have seen and examined the patient. I agree with the workup, evaluation, management, and diagnosis. The care plan has been discussed. My assessment reveals 50 y.o. M recently status post ex lap for choledocholithiasis 9/14/2021 (Dr. Kaykay Muller), aborted given findings of profound right upper quadrant inflammation, presenting with complaint of oliguria. Symptoms started following the aforementioned surgery. Performs straight cath. Today, was only able to produce 20 mL total. Patient complains of extreme nausea, firmness of the abdomen, and swelling of extremities. My focused exam reveals abdominal distension and tenderness. Lap incision sites with dressings C/D/I. Renal panel with hyponatremia and hypochloremia, creatinine 2.2 from baseline 1.2, BUN 76. Bladder scan with no detectable urine. Although the patient is third spacing fluids, his diuresis has likely caused an SUDHA. Will start with a 1L fluid bolus. Patient has a leukocytosis with left shift and procalcitonin is slightly elevated however less than 0.5. No clear source of infection therefore antibiotics are deferred at this juncture. Troponin and BNP are both elevated however these are chronic findings for the patient. In the absence of chest pain and with a nonischemic EKG, not concerning for ACS. CT abdomen and pelvis shows loop colostomy, unchanged, with no bowel obstruction. Increased mild ascites in the abdomen and pelvis. No hydronephrosis or urinary obstruction. I note severe degenerative changes at L5-S1 with extensive erosive changes and soft tissue density anterior to the disc space, unchanged.   I note radiology's recommendations for surgical follow-up, which is deferred to the inpatient team.        CT ABDOMEN PELVIS WO CONTRAST Additional Contrast? None   Final Result   Loop colostomy along the left lower quadrant which is unchanged with no bowel   obstruction. Metallic streak artifact partially obscuring the upper abdomen which is   limiting the exam.      Mild ascites in the abdomen and pelvis which is more prominent. Mild chronic liver changes and mild splenomegaly which is unchanged      No hydronephrosis or urinary obstruction. Interval removal of the Delgado catheter with persistent diffuse bladder wall   thickening which may just be due to poor distention. Recommend clinical   follow-up      Questionable moderate 3rd spacing of fluid or cellulitis in the subcutaneous   soft tissues throughout the abdomen and pelvis which is more prominent. Probable gynecomastia which is unchanged. Severe degenerative changes in the spine which is most prominent L5-S1 with   extensive erosive changes and soft tissue density anterior to the disc space   which is unchanged. Recommend surgical follow-up. Moderate bibasilar pleural effusions and associated bibasilar atelectasis and   consolidations which is more prominent. XR CHEST PORTABLE   Final Result   Stable mild cardiomegaly and minimal central pulmonary congestion. Hazy bibasilar atelectasis or infiltrates and a questionable small left   pleural effusion which is more apparent. Right Port-A-Cath unchanged in position            EKG interpreted by myself. Rate: normal  Rhythm: NSR  Axis: -37  Intervals: within normal  QRS 84 QTc 435  ST Segments: no acute abnormality  T waves: no acute abnormality  Comparison: Compared to 9/5/21, there is resolution of first-degree AV block, and rhythm is normal sinus rhythm from sinus bradycardia  Impression normal sinus rhythm with left axis deviation, low voltage QRS, inferior and anterolateral infarcts seen on before 9/5/2021         Admit to hospital medicine for further evaluation and treatment.      For further details of the patient's emergency department visit, please see the advanced practice provider's documentation. Jammie Che MD     This report has been produced using speech recognition software and may contain errors related to that system including errors in grammar, punctuation, and spelling, as well as words and phrases that may be inappropriate. If there are any questions or concerns please feel free to contact the dictating provider for clarification.        Jammie Che MD  09/16/21 8710

## 2021-09-17 NOTE — PROGRESS NOTES
RESPIRATORY THERAPY ASSESSMENT    Name:  [facility] Record Number:  [id number]  Age: 50 y.o. Gender: male  : 1965  Today's Date:  2021  Room:  [place] [address]    Assessment     Is the patient being admitted for a COPD or Asthma exacerbation? No   (If yes the patient will be seen every 4 hours for the first 24 hours and then reassessed)    Patient Admission Diagnosis      Allergies  Allergies   Allergen Reactions    Benadryl [Diphenhydramine Hcl] Anaphylaxis     Throat swelling    Keflex [Cephalexin] Anaphylaxis    Statins      muscle aches     Cephalexin Rash     Hives     Morphine Anxiety     Hallucinations     Penicillins Rash     Hives     Sulfa Antibiotics Rash       Minimum Predicted Vital Capacity:               Actual Vital Capacity:                    Pulmonary History:COPD and CHF/Pulmonary Edema  Home Oxygen Therapy:  room air  Home Respiratory Therapy:Albuterol and Vilanterol/Fluticasone Furoate   Current Respiratory Therapy:  Albuterol QID PRN,  Symbicort BID. Respiratory Severity Index(RSI)   Patients with orders for inhalation medications, oxygen, or any therapeutic treatment modality will be placed on Respiratory Protocol. They will be assessed with the first treatment and at least every 72 hours thereafter. The following severity scale will be used to determine frequency of treatment intervention.     Smoking History: Pulmonary Disease or Smoking History, Greater than 15 pack year = 2    Social History  Social History     Tobacco Use    Smoking status: Never Smoker    Smokeless tobacco: Never Used   Vaping Use    Vaping Use: Never used   Substance Use Topics    Alcohol use: No    Drug use: No       Recent Surgical History: None = 0  Past Surgical History  Past Surgical History:   Procedure Laterality Date    ABDOMEN SURGERY      BACK SURGERY      T6-T11 hardware    CARDIAC CATHETERIZATION  10/2017    3 stents placed    CENTRAL VENOUS CATHETER  OTHER SURGICAL HISTORY Right 10/13/2016    EXCISION INFECTED BONE AND TISSUE RIGHT FOOT    OTHER SURGICAL HISTORY Left 02/02/2017    debridement infected tissue left foot    OTHER SURGICAL HISTORY Left 05/25/2017    ULCER DEBRIDEMENT LEFT FOOT     OTHER SURGICAL HISTORY Left 05/10/2018    FIBULAR OSTEOTOMY LEFT LOWER LEG, DEBRIDEMENT OF MULTIPLE    OTHER SURGICAL HISTORY Left 05/15/2018    INCISION AND DRAINAGE WITH RESECTION OF NECROTIC BONE AND TISSUE, DELAYED PRIMARY CLOSURE LEFT/LEG FOOT    OTHER SURGICAL HISTORY Right 07/26/2018    Amputation third and forth ray, fifth toe, debridement of multiple compartments including bone with removal of cuboid and lateral cuneiform, bone biopsy of cuboid and base of third ray (Right )    OTHER SURGICAL HISTORY  07/24/2020    phacoemulsification of cataract with intraocular lens implant right eye    [place] AMPUTATION METATARSAL+TOE,SINGLE Right 07/26/2018    Amputation third and forth ray, fifth toe, debridement of multiple compartments including bone with removal of cuboid and lateral cuneiform, bone biopsy of cuboid and base of third ray performed by Monalisa Vasques DPM at [address] [facility] T/A/L AREA/<100SCM /<1ST 25 SCM Right 75/12/4613    APPLICATION GRAFT FOREFOOT, SURGICAL PREPARATION OF WOUND BED, APPLICATION GRAFT RIGHT HEEL, APPLICATION NEGATIVE PRESSURE DRESSING WITH APPLICATION BELOW KNEE SPLINT performed by Monalisa Vasques DPM at [address] [facility],HEAD,FAC,HAND,FEET <100SQCM Bilateral 07/30/2018    INCISION AND DRAINAGE WITH DELAYED PRIMARY CLOSURE, RIGHT FOOT, SPLIT THICKNESS SKIN GRAFT, SPLIT THICKNESS SKIN GRAFT, LEFT HEEL, APPLICATION OF TOTAL CONTACT CAST, BILATERAL,  APPLICATION OF WOUND VAC DRESSING, BILATERAL HEEL, MULTIPLE FOOT WOUNDS BILATERAL performed by Monalisa Vasques DPM at [address]       rotator cuff, torn bicep    TUNNELED VENOUS PORT PLACEMENT Right 01/17/2019    UPPER GASTROINTESTINAL ENDOSCOPY N/A 02/03/2021    EGD W/ANES. (9:30) PT IMMOBILE performed by Rodolfo Hinojosa MD at [address]  02/03/2021    EGD DILATION SAVORY [facility]    Removed after 3 months       Level of Consciousness: Alert, Oriented, and Cooperative = 0    Level of Activity: Bedridden, unresponsive or quadriplegic = 4    Respiratory Pattern: Dyspnea with exertion;Irregular pattern;or RR less than 6 = 2    Breath Sounds: Diminshed bilaterally and/or crackles = 2    Sputum   ,  ,    Cough: Strong, spontaneous, non-productive = 0    Vital Signs   /75   Pulse 75   Temp 98.3 °F (36.8 °C) (Oral)   Resp 15   Ht 6' 2\" (1.88 m)   Wt 280 lb (127 kg)   SpO2 92%   BMI 35.95 kg/m²   SPO2 (COPD values may differ): Greater than or equal to 92% on room air = 0    Peak Flow (asthma only): not applicable = 0    RSI: 9-58 = TID (three times daily) and Q4hr PRN for dyspnea        Plan       Goals: medication delivery    Patient/caregiver was educated on the proper method of use for Respiratory Care Devices:        Level of patient/caregiver understanding able to:   ? Verbalize understanding   ? Demonstrate understanding       ? Teach back        ? Needs reinforcement       ? No available caregiver               ? Other:     Response to education:       Is patient being placed on Home Treatment Regimen? No     Does the patient have everything they need prior to discharge? NA     Comments: Chart reviewed and patient assessed. Plan of Care: Albuterol TID,  Symbicort BID,  Albuterol Q4PRN. Electronically signed by Yasemin Reynolds RCP on 9/17/2021 at 3:42 AM    Respiratory Protocol Guidelines     1.  Assessment and treatment by Respiratory Therapy will be initiated for medication and therapeutic interventions upon initiation of aerosolized medication. 2. Physician will be contacted for respiratory rate (RR) greater than 35 breaths per minute. Therapy will be held for heart rate (HR) greater than 140 beats per minute, pending direction from physician. 3. Bronchodilators will be administered via Metered Dose Inhaler (MDI) with spacer when the following criteria are met:  a. Alert and cooperative     b. HR < 140 bpm  c. RR < 30 bpm                d. Can demonstrate a 2-3 second inspiratory hold  4. Bronchodilators will be administered via Hand Held Nebulizer JOCELY[facility]) to patients when ANY of the following criteria are met  a. Incognizant or uncooperative          b. Patients treated with HHN at Home        c. Unable to demonstrate proper use of MDI with spacer     d. RR > 30 bpm   5. Bronchodilators will be delivered via Metered Dose Inhaler (MDI), HHN, Aerogen to intubated patients on mechanical ventilation. 6. Inhalation medication orders will be delivered and/or substituted as outlined below. Aerosolized Medications Ordering and Administration Guidelines:    1. All Medications will be ordered by a physician, and their frequency and/or modality will be adjusted as defined by the patients Respiratory Severity Index (RSI) score. 2. If the patient does not have documented COPD, consider discontinuing anticholinergics when RSI is less than 9.  3. If the bronchospasm worsens (increased RSI), then the bronchodilator frequency can be increased to a maximum of every 4 hours. If greater than every 4 hours is required, the physician will be contacted. 4. If the bronchospasm improves, the frequency of the bronchodilator can be decreased, based on the patient's RSI, but not less than home treatment regimen frequency. 5. Bronchodilator(s) will be discontinued if patient has a RSI less than 9 and has received no scheduled or as needed treatment for 72  Hrs. Patients Ordered on a Mucolytic Agent:    1.  Must always be administered with a bronchodilator. 2. Discontinue if patient experiences worsened bronchospasm, or secretions have lessened to the point that the patient is able to clear them with a cough. Anti-inflammatory and Combination Medications:    1. If the patient lacks prior history of lung disease, is not using inhaled anti-inflammatory medication at home, and lacks wheezing by examination or by history for at least 24 hours, contact physician for possible discontinuation.
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
## 2021-09-17 NOTE — PROGRESS NOTES
Pt with complaints of nausea. MD notified with new orders. Pt states the ordered medication compazine, he does not tolerate and he had a reaction last time refusing at this time. Pt states phenergan works for him. Denies other needs.

## 2021-09-17 NOTE — PLAN OF CARE
Problem: Skin Integrity:  Goal: Will show no infection signs and symptoms  Description: Will show no infection signs and symptoms  9/17/2021 0315 by Beckey Najjar, RN  Outcome: Ongoing  9/17/2021 0315 by Beckey Najjar, RN  Outcome: Ongoing     Problem: Infection:  Goal: Will remain free from infection  Description: Will remain free from infection  Outcome: Ongoing     Problem: Safety:  Goal: Free from accidental physical injury  Description: Free from accidental physical injury  Outcome: Ongoing

## 2021-09-17 NOTE — PROGRESS NOTES
Comprehensive Nutrition Assessment    Type and Reason for Visit:  Initial, Wound    Nutrition Recommendations/Plan:   1. Continue the 4 CCC diet  2. Added 1 Proteinex @ go wit dinner     Nutrition Assessment:    Pt. nutritionally compromised AEB he was admitted to the ER with swelling, abdominal bloating, nausea and decreased urination symptoms for the past 3 days. Recent admission 9/5 for sepsis, UTI, anasarca, choledocholithiasis. He was released from the hospital had outpatient abdominal laparoscopic surgery on 9/14 states they were going to remove his gallbladder but it was adhesed to the liver and decided not to remove at this time; since d/d on the 14th he  has been feeling bad abdominal bloating nausea and unable to urinate. He is paraplegic does not feel from chest down . At risk for further nutrition compromise r/t he has not been eating well for since the 14th and he has increased needs r/t to chronic wounds that are pressure in nature from hardware in his back. Will add 1 [id number] at dinner . Malnutrition Assessment:  Malnutrition Status:   At risk for malnutrition (Comment)    Context:  Acute Illness     Findings of the 6 clinical characteristics of malnutrition:  Energy Intake:  Mild decrease in energy intake (Comment)  Weight Loss:  No significant weight loss     Body Fat Loss:  Unable to assess     Muscle Mass Loss:  Unable to assess    Fluid Accumulation:  Unable to assess     Strength:  Not Performed    Estimated Daily Nutrient Needs:  Energy (kcal):  9217-2931 based ~ 8-11 kcal/kg cbw; Weight Used for Energy Requirements:  Current     Protein (g):  172 based ~ 2g/kg / ibw; Weight Used for Protein Requirements:  Ideal        Fluid (ml/day):  5933-4596; Method Used for Fluid Requirements:  1 ml/kcal      Nutrition Related Findings:  pt lying in bed just arrived to his room from overflow; coughing; reports reduced intake for last 3 days d/t nausea and surgery;  wounds are chronic ; parapeligic; Wounds:  Multiple, Stage II, Pressure Injury, Surgical Incision       Current Nutrition Therapies:    ADULT DIET; Regular; 4 carb choices (60 gm/meal); 1500 ml    Anthropometric Measures:  · Height: 6' 2\" (188 cm)  · Current Body Weight: 13 lb 6 oz (6.067 kg)   · Admission Body Weight:  (not obtained)    · Usual Body Weight: 280 lb (127 kg)     · Ideal Body Weight: 190 lbs; % Ideal Body Weight 7 %   · BMI: 1.7  · BMI Categories: Obese Class 3 (BMI 40.0 or greater)       Nutrition Diagnosis:   · Inadequate oral intake related to catabolic illness, impaired respiratory function as evidenced by wounds, nausea, intake 0-25%      Nutrition Interventions:   Food and/or Nutrient Delivery:  Continue Current Diet, Start Oral Nutrition Supplement  Nutrition Education/Counseling:  No recommendation at this time   Coordination of Nutrition Care:  Continue to monitor while inpatient    Goals:  pt will increase his intake of nutrition by consuming > 50% of meals and supps       Nutrition Monitoring and Evaluation:   Behavioral-Environmental Outcomes:  None Identified   Food/Nutrient Intake Outcomes:  Food and Nutrient Intake, Supplement Intake  Physical Signs/Symptoms Outcomes:  Weight, Nutrition Focused Physical Findings, GI Status     Discharge Planning:     Too soon to determine     Electronically signed by Pricilla Vázquez RD, LD on 9/17/21 at 4:02 PM EDT    Contact: [phone]

## 2021-09-17 NOTE — ED NOTES
Nabila served Dr Sherwin Perez, and returned call and spoke with Ki Sarmiento, LIONEL  09/17/21 8599

## 2021-09-17 NOTE — CARE COORDINATION
Readmission Assessment  Number of Days since last admission?: 1-7 days (Pt was at [facility] from 09/05/2021-09/09/2021)  Previous Disposition: Home with Family (Pt resumed his Portia Lock through [facility])  Who is being Interviewed: Patient  What was the patient's/caregiver's perception as to why they think they needed to return back to the hospital?: Other (Comment) (Pt stated he was supposed to have surgery Tuesday but they couldn't do it.  He stated that he went home and couldn't eat or drink so he got dehydrated)  Did you visit your Primary Care Physician after you left the hospital, before you returned this time?: No  Why weren't you able to visit your PCP?: Did not have an appointment (Pt stated he call his PCP and they told him to wait until after his surgery for an appointment)  Did you see a specialist, such as Cardiac, Pulmonary, Orthopedic Physician, etc. after you left the hospital?: Yes (Pt stated he saw Dr. Alberto Bender and Dr. Pool Chen)  Who advised the patient to return to the hospital?: Self-referral  Does the patient report anything that got in the way of taking their medications?: No  In our efforts to provide the best possible care to you and others like you, can you think of anything that we could have done to help you after you left the hospital the first time, so that you might not have needed to return so soon?: Other (Comment) (Pt stated nothing additional when writer inquired)
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
## 2021-09-17 NOTE — PROGRESS NOTES
4 Eyes Skin Assessment     The patient is being assess for   Admission    I agree that 2 RN's have performed a thorough Head to Toe Skin Assessment on the patient. ALL assessment sites listed below have been assessed. Areas assessed for pressure by both nurses:   [x]   Head, Face, and Ears   [x]   Shoulders, Back, and Chest, Abdomen  [x]   Arms, Elbows, and Hands   [x]   Coccyx, Sacrum, and Ischium  [x]   Legs, Feet, and Heels                All Mepilex Borders were peeled back and area peeked at by both nurses:  Yes  Please list where Mepilex Borders are located:  two mepilex to RLE one large in mddle of back, one large on to right buttock             **SHARE this note so that the co-signing nurse is able to place an eSignature**    Co-signer eSignature: {Esignature:[id number]}    Does the Patient have Skin Breakdown related to pressure?   Yes [facility]OUND C[facility] was Initiated documentation include the [place]-wound, Wound Assessment, Measurements, Dressing Treatment, Drainage, and Color\",            Ismael Prevention initiated:  Yes   Wound Care Orders initiated:  Yes      [id number]  nurse consulted for Pressure Injury (Stage 3,4, Unstageable, DTI, NWPT, Complex wounds)and New or Established Ostomies:  Yes      Primary Nurse eSignature: Electronically signed by Lindalee Merlin, RN on 9/17/21 at 5:15 AM EDT
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
## 2021-09-17 NOTE — CONSULTS
per 24 hour   Intake --   Output 400 ml   Net -400 ml     Culture Date      Source                       Results      Ht Readings from Last 1 Encounters:   09/16/21 6' 2\" (1.88 m)        Wt Readings from Last 1 Encounters:   09/16/21 280 lb (127 kg)       Body mass index is 35.95 kg/m². Estimated Creatinine Clearance: 53 mL/min (A) (based on SCr of 2.3 mg/dL (H)). Goal Trough Level: 13-18 mcg/mL    Assessment/Plan:  Will initiate Vancomycin with a one time loading dose of 2000 mg x1 (given as 1g in ED and 1 g upon arrival to the floor), followed by pulse-dosing of subsequent doses due to patient's paraplegic state. Daily vancomycin levels have been ordered. Will re-dose based on results of those levels. Next level due 9/18 with AM labs. Thank you for the consult. Will continue to follow.

## 2021-09-17 NOTE — TELEPHONE ENCOUNTER
Writer contacted Dr. Ezequiel Trejo to inform of 30 day readmission risk. Dr. Ezequiel Trejo informed writer there is no decision on disposition at this time.

## 2021-09-17 NOTE — CARE COORDINATION
Case Management Assessment  Initial Evaluation      Patient Name: Low Reese  YOB: 1965  Diagnosis: Lactic acidosis [E87.2]  Oliguria [R34]  Anasarca [R60.1]  Hyponatremia [E87.1]  Pleural effusion, bilateral [J90]  SUDHA (acute kidney injury) ([facility] [address] [N17.9]  Bacterial urinary tract infection [N39.0, A49.9]  Decreased urine output [R34]  Date / Time: 9/16/2021  7:22 PM    Admission status/Date: 09/17/2021 Inpatient   Chart Reviewed: Yes      Patient Interviewed: Yes   Family Interviewed:  No      Hospitalization in the last 30 days:  Yes from 09/05/2021-09/09/2021. Pt discharged home on 09/09/2021    Health Care Decision Maker :   Primary Decision Maker: Scarlett Honeycutt - Child - [phone]    Primary Decision Maker: Bay Stringers - Spouse - [phone]    (CM - must 1st enter selection under Navigator - emergency contact- [place] Relationship and pick relationship)   Who do you trust or have selected to make healthcare decisions for you    Met with: pt   Interview conducted  (bedside/phone): bedside    Current PCP: Dr. Zaria Hull and [address] required for SNF : N          3 night stay required - N-WAIVED    ADLS  Support Systems/Care Needs: Friends/Neighbors, Family Members  Transportation: EMS transportation  -prefers [facility]. Pt does not want [facility] or [facility]  Meal Preparation: family    Housing  Living Arrangements: pt lives at home.  His mother and his daughter is there that assist him  Steps: Ramp  Intent for return to present living arrangements: Yes  Identified Issues: frequent readmissions     Home Care Information  Active with [address] : Yes - [address] [facility]:(Services)  Type of Home Care Services: Nursing Services (three times a week)  Passport/Waiver : Yes -   :   Satinder Hammer stated we can speak with her if she calls                   Phone Number:    Passport/Waiver Services: He has hours for [facility] but they haven't been able to find staff          Durable Medical Equiptment   DME Provider:   Equipment:   Walker___Cane___RTS___ BSC___Shower Chair___Hospital Bed_x__W/C____Other__Hoyer lift and power wheelchiar______  02 at ____Liter(s)---wears(frequency)_______ [facility] ___ CPAP__x (doesn't use)_ BiPap___   N/A____      Home O2 Use :  No    If No for home O2---if presently on O2 during hospitalization:  No  if yes CM to follow for potential DC O2 need  Informed of need for care provider to bring portable home O2 tank on day of discharge for nursing to connect prior to leaving:   Not Indicated  Verbalized agreement/Understanding:   Not Indicated    Community Service Affiliation  Dialysis:  No    · Agency:  · Location:  · Dialysis Schedule:  · Phone:   · Fax: Other Community Services: [facility] L[facility]; he stated he had an appointment today    DISCHARGE PLAN: Explained Case Management role/services. Chart review completed. Met with pt at bedside. Pt stated he plans on returning home. He stated his family assist him with his ADL's. He stated that he wants to use [facility] and if they can't transport him, he doesn't want to use [facility] or [employer]. He denied needs or questions for CM. Spoke with Emily with [facility] who is aware of pt's admission and stated he will follow for pt's discharge. CM will follow. Please notify CM if needs or questions arise.

## 2021-09-17 NOTE — PROGRESS NOTES
Admission questions complete at this time. Shift assessment completed. Pt is alert and oriented. Vital signs are WNL. Respirations are even & easy. Wounds all checked dressing to back and knee changed. Chetna care provided. No complaints voiced. Pt denies needs at this time. SR up x 2 and bed in low position. Call light is within reach. Will monitor.

## 2021-09-17 NOTE — PLAN OF CARE
Nutrition Problem #1: Inadequate oral intake  Intervention: Food and/or Nutrient Delivery: Continue Current Diet, Start Oral Nutrition Supplement  Nutritional Goals: pt will increase his intake of nutrition by consuming > 50% of meals and supps

## 2021-09-17 NOTE — PROGRESS NOTES
[facility] Patient transferred to speciality bed at this time.

## 2021-09-17 NOTE — FLOWSHEET NOTE
09/17/21 1617   Vital Signs   Temp 97.9 °F (36.6 °C)   Temp Source Oral   Pulse 84   Heart Rate Source Monitor   Resp 18   /81   BP Location Left upper arm   Patient Position Semi fowlers   Level of Consciousness Alert (0)   MEWS Score 1   Patient Currently in Pain Denies   Oxygen Therapy   SpO2 95 %   Pulse Oximeter Device Mode Intermittent   Pulse Oximeter Device Location Finger   O2 Device Nasal cannula   O2 Flow Rate (L/min) 2 L/min   Patient transferred from [facility]. NO new orders. Wounds were addressed this afternoon by Adriana Ngo RN.
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
## 2021-09-18 NOTE — PLAN OF CARE
Problem: Skin Integrity:  Goal: Will show no infection signs and symptoms  Description: Will show no infection signs and symptoms  Outcome: Ongoing  Goal: Absence of new skin breakdown  Description: Absence of new skin breakdown  Outcome: Ongoing     Problem: Infection:  Goal: Will remain free from infection  Description: Will remain free from infection  Outcome: Ongoing     Problem: Safety:  Goal: Free from accidental physical injury  Description: Free from accidental physical injury  Outcome: Ongoing  Goal: Free from intentional harm  Description: Free from intentional harm  Outcome: Ongoing     Problem: Daily Care:  Goal: Daily care needs are met  Description: Daily care needs are met  Outcome: Ongoing     Problem: Pain:  Goal: Patient's pain/discomfort is manageable  Description: Patient's pain/discomfort is manageable  Outcome: Ongoing  Goal: Pain level will decrease  Description: Pain level will decrease  Outcome: Ongoing  Goal: Control of acute pain  Description: Control of acute pain  Outcome: Ongoing  Goal: Control of chronic pain  Description: Control of chronic pain  Outcome: Ongoing     Problem: Skin Integrity:  Goal: Skin integrity will stabilize  Description: Skin integrity will stabilize  Outcome: Ongoing     Problem: Discharge Planning:  Goal: Patients continuum of care needs are met  Description: Patients continuum of care needs are met  Outcome: Ongoing     Problem: Nutrition  Goal: Optimal nutrition therapy  9/17/2021 2339 by Phyllis Medina RN  Outcome: Ongoing  9/17/2021 1609 by Rohan Celaya RD, LD  Outcome: Ongoing

## 2021-09-18 NOTE — PROGRESS NOTES
Care Plan, Education, Fall Risk, Ismael Scale, and Lift Assessment complete. Patient is resting and reports no needs at this time.

## 2021-09-18 NOTE — PROGRESS NOTES
Handoff report given to Elba Jim, [facility]. Care transferred.      Electronically signed by Norbert Barahona RN on 9/18/2021 at 7:43 AM

## 2021-09-18 NOTE — PROGRESS NOTES
PM assessment completed. Scheduled medications given per MAR. VSS 2 liter, A/O x4 denies any needs at this time. Call light in reach, will monitor, bed alarm on.

## 2021-09-18 NOTE — PLAN OF CARE
Problem: Skin Integrity:  Goal: Will show no infection signs and symptoms  Description: Will show no infection signs and symptoms  9/18/2021 1119 by Colletta Conners, RN  Outcome: Ongoing  9/17/2021 2339 by Jerry Valdez RN  Outcome: Ongoing  Goal: Absence of new skin breakdown  Description: Absence of new skin breakdown  9/18/2021 1119 by Colletta Conners, RN  Outcome: Ongoing  9/17/2021 2339 by Jerry Valdez RN  Outcome: Ongoing     Problem: Skin Integrity:  Goal: Skin integrity will stabilize  Description: Skin integrity will stabilize  9/18/2021 1119 by Colletta Conners, RN  Outcome: Ongoing  9/17/2021 2339 by Jerry Valdez RN  Outcome: Ongoing

## 2021-09-18 NOTE — PROGRESS NOTES
Shift assessment complete. See flow sheet. Scheduled meds given. See MAR. Patients head-toe complete, VS are logged. No further needs  noted at this time. Call light and bedside table are within reach. The bed is locked and is in the lowest position.
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
## 2021-09-18 NOTE — FLOWSHEET NOTE
09/18/21 1520   Vital Signs   Temp 96.9 °F (36.1 °C)   Temp Source Oral   Pulse 78   Heart Rate Source Monitor   Resp 20   /74   BP Location Right upper arm   Patient Position Semi fowlers   Level of Consciousness Alert (0)   MEWS Score 1   Patient Currently in Pain Denies   Oxygen Therapy   SpO2 94 %   Pulse Oximeter Device Mode Intermittent   Pulse Oximeter Device Location Finger   O2 Device Nasal cannula   O2 Flow Rate (L/min) 2 L/min   Patient resting. Family at bedside. Denies any needs. RN will continue to monitor.
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
## 2021-09-19 NOTE — PROGRESS NOTES
Bedside report given to [facility]. Pt. Denies needs at this time, call light within reach.

## 2021-09-19 NOTE — PROGRESS NOTES
Physician Progress Note      PATIENT:               August Espinoza  CSN #:                  [id number]  :                       1965  ADMIT DATE:       2021 12:17 PM  Saira Joyner Sun'aq DATE:        2021 2:26 PM  RESPONDING  PROVIDER #:        Rachael French MD          QUERY TEXT:    Patient admitted with Sepsis with unclear source. The ID consult notes states   the following on :  Mild elevation in procalcitonin level, but no other   concerning signs or symptoms of sepsis (fever, chills, leukocytosis, elevated   lactate, or even hyperglycemia, which has usually been his first sign of a   developing wound or urinary tract infection in the past). If possible, please   document in progress notes and discharge summary if you are evaluating and /or   treating any of the following: The medical record reflects the following:  Risk Factors:  hx of MRSA infection, diversion ileostomy, Hx of severe back   wounds, DM  Clinical Indicators: hypothermia, procal-0.26, SUDHA  Treatment: ID consult, blood cx x2 - neg, Urine cx: GN lindsay >100,000, IV Vanc   and Merrem    Thank you for your assistance,  Hudson Verdugo RN,BSN,CCDS,CRCR  Options provided:  -- Sepsis confirmed as evidenced by, please include clinical indicators. -- Sepsis ruled out after study  -- Other - I will add my own diagnosis  -- Disagree - Not applicable / Not valid  -- Disagree - Clinically unable to determine / Unknown  -- Refer to Clinical Documentation Reviewer    PROVIDER RESPONSE TEXT:    The Sepsis was ruled out after study.     Query created by: Mina Waggoner on 2021 8:14 AM      Electronically signed by:  Rachael French MD 2021 7:37 PM

## 2021-09-19 NOTE — PLAN OF CARE
Problem: Skin Integrity:  Goal: Will show no infection signs and symptoms  Description: Will show no infection signs and symptoms  9/18/2021 2333 by Marylee Lea, RN  Outcome: Ongoing  9/18/2021 1119 by Rosa M Sheth RN  Outcome: Ongoing  Goal: Absence of new skin breakdown  Description: Absence of new skin breakdown  9/18/2021 2333 by Marylee Lea, RN  Outcome: Ongoing  9/18/2021 1119 by Rosa M Sheth RN  Outcome: Ongoing     Problem: Infection:  Goal: Will remain free from infection  Description: Will remain free from infection  9/18/2021 2333 by Marylee Lea, RN  Outcome: Ongoing  9/18/2021 1119 by Rosa M Sheth RN  Outcome: Ongoing     Problem: Safety:  Goal: Free from accidental physical injury  Description: Free from accidental physical injury  9/18/2021 2333 by Marylee Lea, RN  Outcome: Ongoing  9/18/2021 1119 by Rosa M Sheth RN  Outcome: Ongoing  Goal: Free from intentional harm  Description: Free from intentional harm  9/18/2021 2333 by Marylee Lea, RN  Outcome: Ongoing  9/18/2021 1119 by Rosa M Sheth RN  Outcome: Ongoing     Problem: Daily Care:  Goal: Daily care needs are met  Description: Daily care needs are met  9/18/2021 2333 by Marylee Lea, RN  Outcome: Ongoing  9/18/2021 1119 by Rosa M Sheth RN  Outcome: Ongoing     Problem: Pain:  Goal: Patient's pain/discomfort is manageable  Description: Patient's pain/discomfort is manageable  9/18/2021 2333 by Marylee Lea, RN  Outcome: Ongoing  9/18/2021 1119 by Rosa M Sheth RN  Outcome: Ongoing  Goal: Pain level will decrease  Description: Pain level will decrease  9/18/2021 2333 by Marylee Lea, RN  Outcome: Ongoing  9/18/2021 1119 by Rosa M Sheth RN  Outcome: Ongoing  Goal: Control of acute pain  Description: Control of acute pain  9/18/2021 2333 by Marylee Lea, RN  Outcome: Ongoing  9/18/2021 1119 by Rosa M Sheth RN  Outcome: Ongoing  Goal: Control of chronic pain  Description: Control of chronic pain  9/18/2021 2333 by Iris Bess RN  Outcome: Ongoing  9/18/2021 1119 by Karey Marrero RN  Outcome: Ongoing     Problem: Skin Integrity:  Goal: Skin integrity will stabilize  Description: Skin integrity will stabilize  9/18/2021 2333 by Iris Bess RN  Outcome: Ongoing  9/18/2021 1119 by Karey Marrero RN  Outcome: Ongoing     Problem: Discharge Planning:  Goal: Patients continuum of care needs are met  Description: Patients continuum of care needs are met  9/18/2021 2333 by Iris Bess RN  Outcome: Ongoing  9/18/2021 1119 by Karey Marrero RN  Outcome: Ongoing     Problem: Nutrition  Goal: Optimal nutrition therapy  9/18/2021 2333 by Iris Bess RN  Outcome: Ongoing  9/18/2021 1119 by Karey Marrero RN  Outcome: Ongoing     Problem: Falls - Risk of:  Goal: Will remain free from falls  Description: Will remain free from falls  9/18/2021 2333 by Iris Bess RN  Outcome: Ongoing  9/18/2021 1119 by Karey Marrero RN  Outcome: Ongoing  Goal: Absence of physical injury  Description: Absence of physical injury  9/18/2021 2333 by Iris Bess RN  Outcome: Ongoing  9/18/2021 1119 by Karey Marrero RN  Outcome: Ongoing

## 2021-09-19 NOTE — PROGRESS NOTES
PM assessment completed, see flowsheet. Patient was switched to a new bed due to problems with his pump on the bed before.

## 2021-09-19 NOTE — PROGRESS NOTES
Spoke with MD regarding patient wanting to use personal implanted glucometer device. MD gave the ok for patient use. Nursing communication order placed.

## 2021-09-19 NOTE — PROGRESS NOTES
Per patient he gets his dressing changes done on MWF. He does not want this RN to change them today.
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
## 2021-09-20 NOTE — PROGRESS NOTES
Handoff report given to Fuad Dumont RN. Care transferred.      Electronically signed by Pascual Gilman RN on 9/20/2021 at 7:50 AM
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
## 2021-09-20 NOTE — PROGRESS NOTES
PM assessment completed. Scheduled medications given per MAR. VSS 2 liter NC, A/O x4 denies any needs at this time. Call light in reach, will monitor, bed alarm on.

## 2021-09-20 NOTE — PROGRESS NOTES
Wound care treatments completed & dressings changed - see flowsheet. Pt. Refused to have the right BKA dressings changed. Pt. States, \"my stump only gets changed on Fridays\". This nurse noted that the scotum is endematous and weeping fluids and discussed this with pt. Fungal powders applied to abdomen skin folds. Patient denies any needs at this time, call light within reach.

## 2021-09-20 NOTE — OP NOTE
[address]                 [address] [place] [address]                                OPERATIVE REPORT    PATIENT NAME: Charley Wills                   :        1965  MED REC NO:   [id number]                          ROOM:  ACCOUNT NO:   [de-identified]                           ADMIT DATE: 2021  PROVIDER:     Chris Null MD    DATE OF PROCEDURE:  2021    PREOPERATIVE DIAGNOSIS:  History of choledocholithiasis. POSTOPERATIVE DIAGNOSIS:  History of choledocholithiasis. OPERATION PERFORMED:  Diagnostic laparoscopy. SURGEON:  Chris Null MD    ANESTHESIA:  General.    COMPLICATIONS:  None. ESTIMATED BLOOD LOSS:  Less than 50 mL. INDICATIONS FOR OPERATION:  A 53-year-old male, paraplegic, who was  hospitalized in July with septic shock related to choledocholithiasis  and biliary infection. The patient had a drain placed in his  gallbladder. This was inadvertently pulled out during the course of his  recovery and he has done well. Currently, he is having some mild  symptoms at home. It was recommended that he undergo operative  intervention and removal of his gallbladder to prevent another episode. The patient understood the risks and benefits. He recently had  hospitalization for volume overload and was evaluated by the medical  service as well as Nephrology and Cardiology. He was felt to be  optimized for surgery. DESCRIPTION OF OPERATION:  The patient was brought to the operating  room. General anesthesia was induced. He was prepped and draped in  usual surgical sterile fashion. A vertical supraumbilical incision was  made. Veress needle was inserted. Pneumoperitoneum was established. Disposable 5-mm trocar was passed through the incision. The laparoscope  was inserted. Under direct vision, 11-mm port was placed in the  epigastrium and one 5-mm port in the right upper quadrant.   We were  unable to even see the gallbladder. The liver was enlarged. Did not  have visual abnormalities other than its size. It was firm and not very  mobile. There was inflammatory change of the pericolonic tissues in  omentum in the right upper quadrant. I began to try to take some of  these down from the undersurface of the liver. It was very friable. There was some oozing. After about 20 minutes, I still had not seen any  sign of the gallbladder itself. I was at a point where I waited to make  an open right subcostal incision to proceed with cholecystectomy. Given  the intraoperative findings, I made a decision to abort the procedure  and to follow the patient clinically. I felt that given his tenuous  volume status and multiple medical problems that an open incision and  operation would put him at risk for complication. I felt like the  overall risks to him outweigh the benefits at this point. I therefore  removed the trocars and deflated the abdomen. The fascia at the 11-mm  port site was reapproximated with 0 Vicryl suture. Local anesthetic was  infiltrated. 4-0 Vicryl was used to reapproximate the skin at all the  incisions. Benzoin and Steri-Strip dressings were placed. DISPOSITION:  The patient tolerated the procedure without any acute  complication. I saw him in the recovery room and explained the  operative findings as well as the rationale for aborting the procedure. I told him that we would follow him clinically. He states _____ has  another episode or has more severe symptoms. I told him at that point  if I felt like the risk benefit pendulum shifted towards considering  right upper quadrant reoperation and proceeding with cholecystectomy  even that meant an open procedure that I would likely have the  hepatobiliary surgeons embark on this given the intraoperative findings  today. The patient understood this. All of his questions were  answered.   The decision to abort was based on what was thought to be in  his best overall interest and safety. Radha Whitfield MD    D: 09/19/2021 17:54:41       T: 09/19/2021 17:58:18     [id number]  Job#: [id number]     Doc#: [id number]    CC:   Danny Hinds MD
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
## 2021-09-20 NOTE — PROGRESS NOTES
Bedside report given to [facility]. Pt. Denies needs at this time, call light within reach.

## 2021-09-21 NOTE — FLOWSHEET NOTE
09/21/21 1515   Vital Signs   Temp 98 °F (36.7 °C)   Temp Source Oral   Pulse 84   Heart Rate Source Monitor   Resp 19   /75   BP Location Right upper arm   Patient Position Semi fowlers   Level of Consciousness Alert (0)   MEWS Score 1   Patient Currently in Pain Denies   Pain Assessment   Pain Assessment 0-10   Pain Level 1   Prater-Baker Pain Rating 0   Patient's Stated Pain Goal No pain   Oxygen Therapy   SpO2 95 %   O2 Device Nasal cannula   O2 Flow Rate (L/min) 2 L/min     Afternoon vitals completed. Bed sheets changed. Back dressing change completed. Pt denies any other care needs at this time. Pt stable.     Adore Schaefer RN
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
## 2021-09-21 NOTE — PROGRESS NOTES
Report given to Aurea Braswell RN at patient bedside. Pt is stable, call light in reach, with no needs at this time. Care transferred at this time.    Ramona Carr RN

## 2021-09-21 NOTE — PROGRESS NOTES
Assessment completed, see flowsheets. Night meds given. Blood sugar 252 per dexcom, 2 units ss humalog given, 25 units lantus, per mar. Pt with no reports of pain at this time. Trazodone provided per pt request. Bed/chair alarm on, bed in lowest position with call light in reach.     Myles Coffman RN

## 2021-09-21 NOTE — FLOWSHEET NOTE
09/21/21 0310   Vital Signs   Temp 96.8 °F (36 °C)   Temp Source Oral   Pulse 72   Heart Rate Source Monitor   Resp 18   /76   BP Location Right upper arm   Patient Position Semi fowlers   Oxygen Therapy   SpO2 94 %   O2 Device Nasal cannula   O2 Flow Rate (L/min) 2 L/min   Height and Weight   Weight 275 lb 9.2 oz (125 kg)   Weight Method Bed scale  (pump removed to weigh)   BMI (Calculated) 35.5   Vitals and weight as shown above. Pt is resting with no needs at this time.    Eleanor Bethea RN
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
## 2021-09-21 NOTE — FLOWSHEET NOTE
09/21/21 1045   Vital Signs   Temp 97 °F (36.1 °C)   Temp Source Oral   Pulse 82   Heart Rate Source Monitor   Resp 19   /78   BP Location Right upper arm   Patient Position Semi fowlers   Level of Consciousness Alert (0)   MEWS Score 1   Patient Currently in Pain Denies   Oxygen Therapy   SpO2 95 %   O2 Device Nasal cannula   O2 Flow Rate (L/min) 2 L/min     Pt assessment complete; see flow sheets. Vitals completed. Meds given per MAR. Pt currently resting in bed with the call light within reach. Pt denies any other care needs at this time. Pt stable at this time.     Marcos Coello RN

## 2021-09-22 NOTE — CARE COORDINATION
INTERDISCIPLINARY PLAN OF CARE CONFERENCE    Date/Time: 9/22/2021 3:37 PM  Completed by: Edward Strong RN, Case Management      Patient Name:  Rohan Wilhelm  YOB: 1965  Admitting Diagnosis: Lactic acidosis [E87.2]  Oliguria [R34]  Anasarca [R60.1]  Hyponatremia [E87.1]  Pleural effusion, bilateral [J90]  SUDHA (acute kidney injury) ([facility] [address] [N17.9]  Bacterial urinary tract infection [N39.0, A49.9]  Decreased urine output [R34]     Admit Date/Time:  9/16/2021  7:22 PM    Chart reviewed. Interdisciplinary team contacted or reviewed plan related to patient progress and discharge plans. Disciplines included Case Management, Nursing, and Dietitian. Current Status:INPT  PT/OT recommendation for discharge plan of care: TBD    Expected D/C Disposition:  Home     Discharge Plan Comments: reviewed chart. Plan cont for pt to return home at discharge with current services. CM following.     Home O2 in place on admit: No  Pt informed of need to bring portable home O2 tank on day of discharge for nursing to connect prior to leaving:  Not Indicated  Verbalized agreement/Understanding:  Not Indicated

## 2021-09-22 NOTE — FLOWSHEET NOTE
9.22.21/Suport present for code blue. Patient was stabilized by our hard working staff.     09/22/21 6433   Encounter Summary   Services provided to: Patient   Referral/Consult From: Rounding   Support System Spouse; Family members   Continue Visiting   (9.22.21/Responded to Code Blue)   Complexity of Encounter High   Length of Encounter 45 minutes
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
## 2021-09-22 NOTE — FLOWSHEET NOTE
Treatment time: 1 hours and 4 minutes  Net UF: 867ml    Pre weight: 124.5kg  Post weight: 124.4kg  EDW: n/a    Access used: right neck vascath  Access function: poor    Medications or blood products given: albumin 25g    Regular outpatient schedule: SUDHA    Summary of response to treatment: pt tolerated tx ok, tx ended early, system clotted, right neck vascath function is poor. Treatment stopped per Dr Nathan Dalal. 09/22/21 1638 09/22/21 1800   Treatment   Time On 1638  --    Time Off  --  1800   Vital Signs   /72 127/68   Temp 98.3 °F (36.8 °C) 98.2 °F (36.8 °C)   Pulse 83 78   Resp 17 16   SpO2 97 % 100 %   Weight 274 lb 7.6 oz (124.5 kg) 274 lb 4 oz (124.4 kg)   Post-Hemodialysis Assessment   Rinseback Volume (ml)  --  300 ml   Total Liters Processed (l/min)  --  0 l/min   Duration of Treatment (minutes)  --  64 minutes   Heparin amount administered during treatment (units)  --  0 units   Hemodialysis Intake (ml)  --  500 ml   Hemodialysis Output (ml)  --  1367 ml   NET Removed (ml)  --  867 ml       Copy of dialysis treatment record placed in chart, to be scanned into EMR.     Electronically signed by Britt Beckett RN on 9/22/2021 at 6:36 PM

## 2021-09-22 NOTE — PROGRESS NOTES
Handoff report given to Rajwinder Diaz RN. Care transferred.      Electronically signed by Katalina Jackson RN on 9/22/2021 at 7:34 AM
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
## 2021-09-22 NOTE — PROGRESS NOTES
RN presented to [facility]. Documentation occurred using the navigator. 1 mg of epi was given total. 1332: Pulse was regained, patient is now moving extremities. Appears alert. Ember Blakely responded and was running code. 1337: EKG obtained. Patient will be transferred to ICU at this time.
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
## 2021-09-22 NOTE — PROGRESS NOTES
Pt arrived for image guided temporary dialysis catheter insertion right . Procedure explained including the risk and benefits of the procedure. All questions answered. Pt verbalizes understanding of the procedure and states no more questions. Consent confirmed. Vital signs stable. Labs, allergies, medications, and code status reviewed. No contraindications noted.      Vital Signs  Vitals:    09/22/21 1309   BP: 135/71   Pulse: 76   Resp: 15   Temp:    SpO2: 97%     Pre Cleve Score  1 - Able to move 2 extremities voluntarily on command  2 - BP+/- 20mmHg of normal  2 - Fully awake  1 - Needs oxygen to maintain oxygen saturation >90%  1 - Dyspnic, shallow, or limited breathing    Allergies  Benadryl [diphenhydramine hcl], Keflex [cephalexin], Statins, Cephalexin, Morphine, Penicillins, and Sulfa antibiotics (allergies)    Labs  Lab Results   Component Value Date    INR 1.26 (H) 07/21/2021    PROTIME 14.4 (H) 07/21/2021     Lab Results   Component Value Date    CREATININE 2.0 (H) 09/22/2021    BUN 96 (HH) 09/22/2021     (L) 09/22/2021    GFR >60 05/21/2013    K 4.1 09/22/2021    CL 87 (L) 09/22/2021    CO2 26 09/22/2021     Lab Results   Component Value Date    WBC 10.0 09/21/2021    HGB 13.0 (L) 09/21/2021    HCT 40.0 (L) 09/21/2021    MCV 84.9 09/21/2021     (L) 09/21/2021

## 2021-09-22 NOTE — PROGRESS NOTES
PM assessment completed. Scheduled medications given per MAR. VSS 2 liter, A/O x4 denies any needs at this time. Call light in reach, will monitor, bed alarm.

## 2021-09-22 NOTE — PROGRESS NOTES
Patient coded in IR, please see previous notes from other staff members. Patient report given to [facility] RN in ICU room 5. Patient blood sugar checked on his device and was in the 140's. [facility] stated she will call to update family on change in condition. Patient was alert and on non rebreather. Dr. Av Manuel at bedside assessing patient.

## 2021-09-22 NOTE — PLAN OF CARE
Problem: Skin Integrity:  Goal: Will show no infection signs and symptoms  Description: Will show no infection signs and symptoms  Outcome: Ongoing  Goal: Absence of new skin breakdown  Description: Absence of new skin breakdown  Outcome: Ongoing     Problem: Infection:  Goal: Will remain free from infection  Description: Will remain free from infection  Outcome: Ongoing     Problem: Safety:  Goal: Free from accidental physical injury  Description: Free from accidental physical injury  Outcome: Ongoing  Goal: Free from intentional harm  Description: Free from intentional harm  Outcome: Ongoing     Problem: Daily Care:  Goal: Daily care needs are met  Description: Daily care needs are met  Outcome: Ongoing     Problem: Pain:  Goal: Patient's pain/discomfort is manageable  Description: Patient's pain/discomfort is manageable  Outcome: Ongoing  Goal: Pain level will decrease  Description: Pain level will decrease  Outcome: Ongoing  Goal: Control of acute pain  Description: Control of acute pain  Outcome: Ongoing  Goal: Control of chronic pain  Description: Control of chronic pain  Outcome: Ongoing     Problem: Skin Integrity:  Goal: Skin integrity will stabilize  Description: Skin integrity will stabilize  Outcome: Ongoing     Problem: Discharge Planning:  Goal: Patients continuum of care needs are met  Description: Patients continuum of care needs are met  Outcome: Ongoing     Problem: Nutrition  Goal: Optimal nutrition therapy  Outcome: Ongoing     Problem: Falls - Risk of:  Goal: Will remain free from falls  Description: Will remain free from falls  Outcome: Ongoing  Goal: Absence of physical injury  Description: Absence of physical injury  Outcome: Ongoing

## 2021-09-22 NOTE — PROGRESS NOTES
Shift assessment complete see doc flow sheet. Respirations easy and even. Patient resting in bed and denies any needs at this time.

## 2021-09-22 NOTE — PROGRESS NOTES
Image guided temporary dialysis catheter completed. Dr. Trini Antunez placed a 12.5 [place] 16 cm Luli Elite acute triple lumen dialysis catheter LOT [id number] EXP 04/01/2022 in the right IJ. Line aspirates and flushes with ease. Dialysis catheter secured with sutures. Surgical site dressing clean, dry, and intact. Each dialysis access lumen heparin locked with 1.3 ml of IV heparin each. IV access pigtail NS locked. Pt tolerated procedure without any signs or symptoms of distress. Vital signs stable. Report called to Thedore Homans on PCU. Pt transported to ICU in stable condition via bed by transport, clinical. Line ok to use per Laurne.       Post Cleve Score  1 - Able to move 2 extremities voluntarily on command  2 - BP+/- 20mmHg of normal  1 - Needs oxygen to maintain oxygen saturation >90%  1 - Dyspnic, shallow, or limited breathing  1 - Arousable on calling    Total  IV Sedation  2 mg Versed  100 mcg Fentanyl
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
## 2021-09-22 NOTE — SIGNIFICANT EVENT
Significant Event    I responded to a CODE BLUE in Interventional Radiology. Patient was undergoing an IR procedure in which he received sedation including fentanyl and versed. He reportedly became hypoxic, bradycardic, and unresponsive. On my initial assessment, the patient is cyanotic. There is no palpable right radial or femoral pulse. Manual CPR was initiated. Patient was administered 2 mg naloxone. Breaths initially delivered via BVM. I placed a #5 iGel. Patient rec'd 1 mg epinephrine IV. On next rhythm check, patient has palpable pulses. Sinus tachycardia on monitor. SpO2 98%. Patient's mental status improving. He is now gagging on the iGel. The iGel was removed and the patient is maintaining his airway. Patient was reportedly previously admitted to PCU. He will be transferred to ICU. Care is transitioned to the ICU team at this time.      Procedures  - iGel #5 placement  - CPR    Impression  Respiratory arrest    Denny Del Cid MD   9/22/2021 @ 1:39 PM

## 2021-09-23 NOTE — PROGRESS NOTES
Patient admitted to room 301 from ICU. Patient oriented to room, call light, bed rails, phone, lights and bathroom. Patient instructed about the schedule of the day including: vital sign frequency, lab draws, possible tests, frequency of MD and staff rounds, daily weights, I &O's and prescribed diet. bed alarm in place, patient aware of placement and reason. Telemetry box in place, patient aware of placement and reason. Bed locked, in lowest position, side rails up 2/4, call light within reach. Recliner Assessment  Patient is not able to demonstrated the ability to move from a reclining position to an upright position within the recliner. however patient is alert, oriented and able to provide informed consent    4 Eyes Skin Assessment     The patient is being assess for   Transfer to New Unit    I agree that 2 RN's have performed a thorough Head to Toe Skin Assessment on the patient. ALL assessment sites listed below have been assessed. Areas assessed for pressure by both nurses:   [x]   Head, Face, and Ears   [x]   Shoulders, Back, and Chest, Abdomen  [x]   Arms, Elbows, and Hands   [x]   Coccyx, Sacrum, and Ischium  [x]   Legs, Feet, and Heels         Skin Assessed Under all Medical Devices by both nurses:  shivam              All Mepilex Borders were peeled back and area peeked at by both nurses:  Yes  Please list where Mepilex Borders are located:  two mepilex to RLE one large in mddle of back, one large on to right buttock                       **SHARE this note so that the co-signing nurse is able to place an eSignature**    Co-signer eSignature: Electronically signed by Mustapha Merino RN on 9/23/21 at 5:12 AM EDT       Does the Patient have Skin Breakdown related to pressure?   Yes LDA WOUND CARE was Initiated documentation include the Chetna-wound, Wound Assessment, Measurements, Dressing Treatment, Drainage, and Color\",              Ismael Prevention initiated:  yes  Wound Care Orders initiated: yes      WOC nurse consulted for Pressure Injury (Stage 3,4, Unstageable, DTI, NWPT, Complex wounds)and New or Established Ostomies:  yes    Primary Nurse eSignature: Electronically signed by Rosana Zamora RN on 9/23/21 at 5:06 AM EDT
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
## 2021-09-23 NOTE — PROGRESS NOTES
Dialysis nurse unable to do dialysis on the pt. Dialysis stopped because of issues with the vascath.

## 2021-09-23 NOTE — PLAN OF CARE
Nutrition Problem #1: Inadequate oral intake  Intervention: Food and/or Nutrient Delivery: Continue Current Diet, Start Oral Nutrition Supplement  Nutritional Goals: patient will accept and consume 75% or greater of meals on ADULT DIET;  Regular; 4 carb choices per meal diet order x 3 meals per day + adhere to 1500 ml FR per day + he will accept and consume 50% or greater of Ensure high-protein (vanilla) with meals

## 2021-09-23 NOTE — PROGRESS NOTES
Patients BS 59 per patients glucose monitor, Patient A/O, asymptomatic. OJ and snack provided. Will recheck.   Rupert Gallegos RN

## 2021-09-23 NOTE — PROGRESS NOTES
Treatment time: 3 hrs    Net UF: 3000    Pre weight: 125.3 KG  Post weight: 122.3 kg  EDW: TBD    Access used: R IJ  Access function: Good at  w/ heparin bolus    Medications or blood products given: N/A    Regular outpatient schedule: TBD    Summary of response to treatment: Patient tolerated treatment well. Access ran well at ordered  with 3000 unit Heparin bolus per MD. Report given to primary RN. Patient returned to room. VSS    Copy of dialysis treatment record placed in chart, to be scanned into EMR.

## 2021-09-23 NOTE — PROGRESS NOTES
Pt returned from dialysis, family at bedside. Warmed up lunch for pt. VSS. Call light in reach. Perfect serve sent to Dr. Av Manuel per wife request, she is requesting to speak with provider regarding plan of care to get pt back home.
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
## 2021-09-23 NOTE — FLOWSHEET NOTE
09/23/21 0830   Vital Signs   Temp 97.7 °F (36.5 °C)   Temp Source Oral   Pulse 70   Heart Rate Source Monitor   Resp 18   /63   BP Location Right upper arm   Patient Position Semi fowlers   Level of Consciousness Alert (0)   MEWS Score 1   Oxygen Therapy   SpO2 95 %   O2 Device Nasal cannula   O2 Flow Rate (L/min) 2 L/min   Pt resting in bed, denies needs. AM assessment complete. Pt has multiple wounds throughout. Colostomy, langley. Pt states pain he has pain to abd that is not new. Call light in reach.

## 2021-09-23 NOTE — FLOWSHEET NOTE
09/22/21 2048   Vital Signs   Temp 97.6 °F (36.4 °C)   Temp Source Oral   Heart Rate Source Monitor   Resp 14   BP (!) 95/57   BP Location Right upper arm   Patient Position Semi fowlers   Level of Consciousness Alert (0)   Patient Currently in Pain Denies   Oxygen Therapy   SpO2 96 %   O2 Device Nasal cannula   O2 Flow Rate (L/min) 2 L/min     Shift assessment completed see, flow sheet. Patient in bed alert and oriented X4. Patients bed linen changed, Patient cleaned up. Dressing changed on sacrum. Pillow support provided. No other needs at this time. Call light in reach.

## 2021-09-23 NOTE — FLOWSHEET NOTE
09/23/21 0117   Vitals   Temp 97.8 °F (36.6 °C)   Temp Source Axillary   Pulse 67   Heart Rate Source Monitor   Resp 18   /65   BP Location Right upper arm   BP Upper/Lower Upper   BP Method Automatic   Patient Position Semi fowlers   Level of Consciousness Alert (0)   MEWS Score 1   Patient Currently in Pain Denies   Cardiac Rhythm Sinus rhythm   Oxygen Therapy   SpO2 95 %   Pulse Oximeter Device Mode Continuous   O2 Flow Rate (L/min) 2 L/min   Pt. In bed awake. Vitals stable. Warm blanket provided, patient denies any further needs,call light within reach and bed alarm on.

## 2021-09-23 NOTE — FLOWSHEET NOTE
09/23/21 0436   Colostomy LUQ Transverse   No Placement Date or Time found. Pre-existing: Yes  Location: LUQ  Colostomy Type: Transverse   Stomal Appliance 1 piece   Stoma  Assessment Pink;Red   Peristomal Assessment Red   Treatment Bag change   Stool Appearance Soft   Stool Color Brown;Black   Stool Amount Medium     Colostomy bag changed. Stoma pink/red. Patient cleaned with bath wipes, provided clean linens and gown. Patient tolerated well. No other needs at this time.   Ayaz Burnett RN

## 2021-09-23 NOTE — PROGRESS NOTES
Pt admitted into ICU rm 5 from IR, from a post code. Pt was given Narcan in IR & is awake @ this time. CM-SR, VSS, pt is afebrile, and will follow commands. Pt is w/out evidence of distress @ this time.

## 2021-09-23 NOTE — PROGRESS NOTES
09/23/21 1800   RT Protocol   History Pulmonary Disease 0   Respiratory pattern 4   Breath sounds 2   Cough 3   Indications for Bronchodilator Therapy On home bronchodilators   Bronchodilator Assessment Score 9
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
## 2021-09-23 NOTE — PROGRESS NOTES
Comprehensive Nutrition Assessment    Type and Reason for Visit:  Reassess    Nutrition Recommendations/Plan:   1. Continue ADULT DIET; Regular; 4 carb choices per meal diet order + 1500 ml FR per day. 2. Added vanilla Ensure high-protein with meals. 3. Monitor appetite, meal intake, and acceptance/intake of ONS. 4. Monitor nutrition-related labs, colostomy function + output, and weight trends. Nutrition Assessment:  patient has slightly improved from a nutritional standpoint AEB his po intake is slowly improving and his medical status has stabilized, however, he remains at risk for further compromise d/t code blue called while patient was having IR-guided dialysis catheter placed on 9/22/21, altered nutrition-related labs, multiple wounds, and decreased po intake PTA; will continue ADULT DIET; Regular; 4 carb choices per meal; 1500 ml FR per day diet order and proteinex P2Go once daily was d/c'd, per patient request + will trial Ensure high-protein (vanilla) with meals    Malnutrition Assessment:  Malnutrition Status: At risk for malnutrition  Context:  Acute Illness     Findings of the 6 clinical characteristics of malnutrition:  Energy Intake:  Mild decrease in energy intake (PTA and x a few days of admission)  Weight Loss:  No significant weight loss     Body Fat Loss:  Unable to assess (patient is in dialysis)     Muscle Mass Loss:  Unable to assess (patient is in dialysis)    Fluid Accumulation:  No significant fluid accumulation     Strength:  Not Performed    Estimated Daily Nutrient Needs:  Energy (kcal):  1875 - 0795 kcals based on 15-18 kcals/kg/CBW; Weight Used for Energy Requirements:  Current     Protein (g):  129 - 156 g protein based on 1.5-1.8 g/kg/IBW;  Weight Used for Protein Requirements:  Ideal        Fluid (ml/day):  1875 - 2232 ml; Method Used for Fluid Requirements:  1 ml/kcal      Nutrition Related Findings:  patient is A & O; abdomen is tender, taut, and bowel sounds are active; + colostomy with output; a code blue was called for cardiac arrest event during IR-guided catheter placement procedure on 9/22/21; patient did not require intubation; Na, K, Cl, and h/h are low; BUN/Cr are low; GFR = 40; patient has lasix in D5, protonix, Senokot, miconazole, colace, iron, 30 units Lantus BID, and low-dose SSI ordered at this time      Wounds:  Multiple, Pressure Injury, Stage II, Full Thickness, Open Wounds, Surgical Incision (full thickness abscess on L chest wall; full thickness traumatic wound to L knee; R knee - cluster of stage 2 pressure wounds; non-healing surgical area with 4 screws exposed on L medial back)       Current Nutrition Therapies:    ADULT DIET; Regular; 4 carb choices (60 gm/meal); 1500 ml  Adult Oral Nutrition Supplement; Low Calorie/High Protein Oral Supplement    Anthropometric Measures:  · Height: 6' 2\" (188 cm)  · Current Body Weight: 274 lb 7.6 oz (124.5 kg) (obtained on 9/23/21)   · Admission Body Weight: 269 lb (122 kg) (obtained on 9/18/21; bed was zeroed out prior to obtaining weight)    · Usual Body Weight: 269 lb (122 kg) (obtained on 9/18/21; bed was zeroed out before obtaining weight)     · Ideal Body Weight: 190 lbs; % Ideal Body Weight 144.5 %   · BMI: 35.2  · BMI Categories: Obese Class 2 (BMI 35.0 -39.9)       Nutrition Diagnosis:   · Inadequate oral intake related to impaired respiratory function, inadequate protein-energy intake, increase demand for energy/nutrients, pain as evidenced by intake 26-50%, intake 51-75%, poor intake prior to admission, wounds, lab values, dialysis      Nutrition Interventions:   Food and/or Nutrient Delivery:  Continue Current Diet, Start Oral Nutrition Supplement  Nutrition Education/Counseling:  No recommendation at this time   Coordination of Nutrition Care:  Continue to monitor while inpatient    Goals:  patient will accept and consume 75% or greater of meals on ADULT DIET;  Regular; 4 carb choices per meal diet order x 3 meals per day + adhere to 1500 ml FR per day + he will accept and consume 50% or greater of Ensure high-protein (vanilla) with meals       Nutrition Monitoring and Evaluation:   Behavioral-Environmental Outcomes:  Knowledge or Skill, Readiness for Change, Beliefs and Attitutes   Food/Nutrient Intake Outcomes:  Food and Nutrient Intake, Supplement Intake  Physical Signs/Symptoms Outcomes:  Biochemical Data, GI Status, Fluid Status or Edema, Hemodynamic Status, Nutrition Focused Physical Findings, Skin, Weight     Discharge Planning:     Too soon to determine     Electronically signed by Balbina Michael RD, LD on 9/23/21 at 11:37 AM EDT    Contact: [phone]

## 2021-09-24 NOTE — PLAN OF CARE
Problem: Skin Integrity:  Goal: Will show no infection signs and symptoms  Description: Will show no infection signs and symptoms  Outcome: Ongoing  Goal: Absence of new skin breakdown  Description: Absence of new skin breakdown  Outcome: Ongoing     Problem: Infection:  Goal: Will remain free from infection  Description: Will remain free from infection  Outcome: Ongoing     Problem: Safety:  Goal: Free from accidental physical injury  Description: Free from accidental physical injury  Outcome: Ongoing  Goal: Free from intentional harm  Description: Free from intentional harm  Outcome: Ongoing     Problem: Daily Care:  Goal: Daily care needs are met  Description: Daily care needs are met  Outcome: Ongoing

## 2021-09-24 NOTE — CONSULTS
Luc Aj Wound Ostomy Continence Nurse  Follow-up Progress Note       NAME:  [address] RECORD NUMBER:  [id number]  AGE:  50 y.o. GENDER:  male  :  1965  TODAY'S DATE:  2021    Subjective: Pt iona returned from HD, complains of lethargy. Family at bedside. Pt seen for follow up and dressing change to right LE wound. 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 He is followed in the [facility] every other Friday. Current ostomy appliance changed recently, is intact. Pt coded since last seen and sent to ICU, on ventilator after fentanyl administration. He had a large amount of fluid retention all over. Patient Goal of Care:  [x] Wound Healing  [] Odor Control  [] Palliative Care  [] Pain Control   [] Other:     Objective:    BP (!) 102/57   Pulse 96   Temp 98 °F (36.7 °C) (Oral)   Resp 18   Ht 6' 2\" (1.88 m)   Wt 249 lb 5.4 oz (113.1 kg)   SpO2 95%   BMI 32.01 kg/m²   Ismael Risk Score: Ismael Scale Score: 12  Assessment:   Measurements:  Wound 21 #67, Left Chest Wall Cluster (inframmatory),Abcess, Full Thickness, Onsret 21 (Active)   Wound Etiology Other 21   Dressing Status Clean;Dry; Intact 21 1303   Wound Cleansed Not Cleansed; Other (Comment) 21 1600   Dressing/Treatment Other (comment) 21   Dressing Change Due 21 1303   Wound Length (cm) 0.1 cm 21 1044   Wound Width (cm) 0.5 cm 21 1044   Wound Depth (cm) 0.2 cm 21 1044   Wound Surface Area (cm^2) 0.05 cm^2 21 1044   Change in Wound Size % (l*w) -25 21 1044   Wound Volume (cm^3) 0.01 cm^3 21 1044   Wound Healing % 0 21 1044   Wound Assessment Pink/red 21   Drainage Amount None 21   Odor None 21   Chetna-wound Assessment Intact 21   Number of days: 168       Wound 21 #72, Right Medial Knee Cluster, Pressure Injury, Stage 2, Onset 6/22/21 (Active)   Wound Etiology Pressure Stage  2 09/24/21 1303   Dressing Status New dressing applied 09/24/21 1303   Wound Cleansed Vashe 09/20/21 2116   Dressing/Treatment Other (comment) 09/23/21 1953   Wound Length (cm) 5 cm 08/27/21 1044   Wound Width (cm) 11 cm 08/27/21 1044   Wound Depth (cm) 0.1 cm 08/27/21 1044   Wound Surface Area (cm^2) 55 cm^2 08/27/21 1044   Change in Wound Size % (l*w) -00501.19 08/27/21 1044   Wound Volume (cm^3) 5.5 cm^3 08/27/21 1044   Wound Healing % -59876 08/27/21 1044   Wound Assessment Other (Comment) 09/24/21 1303   Number of days: 91       Wound 07/30/21 #76, Left Knee, Trauma, Full Thickness, Onset 7/26/21 (Active)   Wound Etiology Traumatic 09/20/21 2116   Dressing Status New dressing applied 09/24/21 1303   Wound Cleansed Other (Comment); Not Cleansed 09/23/21 1953   Dressing/Treatment Foam 09/24/21 1303   Dressing Change Due 09/24/21 09/24/21 1303   Wound Length (cm) 0.5 cm 08/27/21 1044   Wound Width (cm) 0.2 cm 08/27/21 1044   Wound Depth (cm) 0.1 cm 08/27/21 1044   Wound Surface Area (cm^2) 0.1 cm^2 08/27/21 1044   Change in Wound Size % (l*w) 76.19 08/27/21 1044   Wound Volume (cm^3) 0.01 cm^3 08/27/21 1044   Wound Healing % 76 08/27/21 1044   Wound Assessment Other (Comment) 09/20/21 2116   Drainage Amount Scant 09/20/21 2116   Drainage Description Serous 09/20/21 2116   Odor None 09/20/21 2116   Number of days: 56       Wound 09/05/21 Sacrum (Active)   Wound Image   09/17/21 0421   Dressing Status Clean;Dry; Intact 09/24/21 1303   Wound Cleansed Not Cleansed; Other (Comment) 09/23/21 1953   Dressing/Treatment Foam 09/24/21 1303   Dressing Change Due 09/24/21 09/24/21 1303   Wound Assessment Pink/red 09/24/21 1303   Drainage Amount None 09/24/21 1303   Odor None 09/24/21 1303   Chetna-wound Assessment Edematous; Excoriated; Other (Comment) 09/20/21 1529   Number of days: 18       Wound 09/17/21 Back Left;Medial non healing surgical area with four screws exposed (Active)   Wound Image   09/17/21 0421   Dressing Status Clean;Dry; Intact 09/24/21 1303   Wound Cleansed Not Cleansed 09/24/21 1303   Dressing/Treatment Foam 09/24/21 1303   Dressing Change Due 09/24/21 09/24/21 1303   Number of days: 7     Dusky skin to proximal knee, red base, scattered dry stable scabs. Partial thickness skin loss noted. Right knee had dusky appearance last Friday, worse duskiness today. Response to treatment:  Well tolerated by patient. Pain Assessment:  Severity:  0 / 10  Quality of pain: N/A  Wound Pain Timing/Severity: none  Premedicated: N/A  Plan: Continue current plan, Extra Padding applied over right knee. RLE:  Wounds cleansed with NSS, patted dry. Xeroform gauze applied and foam pad placed over knee x 2. Then right stump wrapped from end to above thigh using ccast padding and two 4 inch ace wraps. Plan of Care: Wound 09/05/21 Sacrum-Dressing/Treatment: Foam  Wound 07/30/21 #76, Left Knee, Trauma, Full Thickness, Onset 7/26/21-Dressing/Treatment: Foam  Wound 09/17/21 Back Left;Medial non healing surgical area with four screws exposed-Dressing/Treatment: Foam  Wound 06/25/21 #72, Right Medial Knee Cluster, Pressure Injury, Stage 2, Onset 6/22/21-Dressing/Treatment:  (polysporin, mepitel, cast padding x2, ace, stockinette)  Wound 04/09/21 #67, Left Chest Wall Cluster (inframmatory),Abcess, Full Thickness, Onsret 4/7/21-Dressing/Treatment: Other (comment) (kiran to alin, opticellAG, mepilex border )    Specialty Bed Required : Yes pt already on  [x] Low Air Loss    [] Pressure Redistribution  [] Fluid Immersion  [x] Bariatric   [] Total Pressure Relief  [] Other:     Current Diet: ADULT DIET; Regular; 4 carb choices (60 gm/meal); 1500 ml  Adult Oral Nutrition Supplement;  Low Calorie/High Protein Oral Supplement  Dietician consult:  Yes    Discharge Plan:  Placement for patient upon discharge: home with support   Patient appropriate for Outpatient [address]: Yes    Referrals:  []   [x] [facility]  [] Supplies  [] Other    Patient/Caregiver Teaching:  Level of patient/caregiver understanding able to:   [] Indicates understanding       [] Needs reinforcement  [] Unsuccessful      [x] Verbal Understanding  [] Demonstrated understanding       [] No evidence of learning  [] Refused teaching         [] N/A       Electronically signed by Rocky Herrera RN, CWOCN on 9/24/2021 at 1:48 PM

## 2021-09-24 NOTE — PROGRESS NOTES
Pt is lying in bed with their eyes closed. Respirations are easy and even. Call light within reach bed in lowest position with the wheels locked. Will continue to monitor.  Lidya Weaver RN
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
## 2021-09-24 NOTE — FLOWSHEET NOTE
Pt's family, wife and mother, present in room. Pt is undergoing dialysis. Mother lives in [place] (dght brought her down to be here), wife and pt live in this area. Pt has been a  in the [facility]. Pt and wife met in youth camp in [facility]. Wife reports that  seemed to be doing better, after past treatment, but needed to resume dialysis again. Family welcomed prayer and shared some life story. 09/24/21 1121   Encounter Summary   Services provided to: Family  (Pt's wife and mother in room, pt away for dialysis)   Referral/Consult From: Denny Chambers [address] of [address] [facility]   (9/24 - Supported family of pt, life review, prayer)   Complexity of Encounter Moderate   Length of Encounter 30 minutes   Spiritual Assessment Completed Yes   Routine   Type Initial   Assessment Calm; Approachable; Hopeful   Intervention Explored feelings, thoughts, concerns;Nurtured hope;Prayer;Discussed relationship with God   Outcome Expressed gratitude;Engaged in conversation; Shared life review;Encouraged; Hopeful;Receptive

## 2021-09-24 NOTE — FLOWSHEET NOTE
Treatment time: 4 hrs    Net UF: 4.5 liters    Pre weight: 118.5kg 1pillow 1top sheet 1white blanket  Post weight: 113.1kg 1pillow 1top sheet  EDW: TBD    Access used: [facility]  Access function: Poorly    Medications or blood products given: Heparin bolus and dwells    Regular outpatient schedule: TBD    Summary of response to treatment:      09/24/21 1209   Vital Signs   BP (!) 124/47   Temp 98.2 °F (36.8 °C)   Pulse 92   Resp 16   Weight 249 lb 5.4 oz (113.1 kg)   Weight Method Bed scale   Percent Weight Change -4.56   Pain Assessment   Pain Assessment 0-10   Pain Level 0   Post-Hemodialysis Assessment   Post-Treatment Procedures Blood returned;Catheter capped, clamped and heparinized x 2 ports   Machine Disinfection Process Exterior Machine Disinfection;Acid/Vinegar Clean;Machine Absence of Bleach Machine   Rinseback Volume (ml) 400 ml   Dialyzer Clearance Moderately streaked   Duration of Treatment (minutes) 240 minutes   Hemodialysis Intake (ml) 600 ml   Hemodialysis Output (ml) 5125 ml   NET Removed (ml) 4525 ml   Tolerated Treatment Fair   Patient Response to Treatment Lowest sbp 92. Raphael Santizo functioned poorly. Able to complete tx w/Qb max @ 150-200. No c/o's voiced. Pt alert. Bilateral Breath Sounds Diminished   RLE Edema +2   LLE Edema +2   Copy of dialysis treatment record placed in chart, to be scanned into EMR. Pt returned to floor in stable condition. No c/o's voiced. Alert. Report given.
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
## 2021-09-24 NOTE — PROGRESS NOTES
Patient returned from dialysis. Family in room waiting. Writer switched O2. Virgil Spencer here to do wound care.

## 2021-09-24 NOTE — FLOWSHEET NOTE
09/24/21 1338   Vital Signs   Temp 98 °F (36.7 °C)   Temp Source Oral   Pulse 96   Heart Rate Source Monitor   Resp 18   BP (!) 102/57   BP Location Left upper arm   Patient Position Semi fowlers   Level of Consciousness Alert (0)   MEWS Score 1   Patient Currently in Pain Denies   Oxygen Therapy   SpO2 95 %   O2 Device Nasal cannula   O2 Flow Rate (L/min) 3 L/min       PRN tylenol given for a headache patient rating 3/10; see MAR

## 2021-09-24 NOTE — FLOWSHEET NOTE
09/23/21 1950   Vital Signs   Temp 97.5 °F (36.4 °C)   Temp Source Oral   Pulse 102   Heart Rate Source Monitor   Resp 18   BP (!) 105/57   BP Location Left upper arm   Level of Consciousness Alert (0)   MEWS Score 2   Oxygen Therapy   SpO2 93 %   O2 Device Nasal cannula   O2 Flow Rate (L/min) 3 L/min   Pt assessment complete. Pt lying in bed quietly. LUng sounds diminished. Pt on 02 3 liters via nasal canula. Lasix gtt infusing at 4ml/hr. Delgado cath in place draining clear yellow urine. Nightly medications given. Denies needs at this time. Call light within reach.

## 2021-09-24 NOTE — CARE COORDINATION
INTERDISCIPLINARY PLAN OF CARE CONFERENCE    Date/Time: 9/24/2021 9:04 AM  Completed by: Jeovanny Nava RN, Case Management      Patient Name:  Narinder Benavidez  YOB: 1965  Admitting Diagnosis: Lactic acidosis [E87.2]  Oliguria [R34]  Anasarca [R60.1]  Hyponatremia [E87.1]  Pleural effusion, bilateral [J90]  SUDHA (acute kidney injury) ([address] [N17.9]  Bacterial urinary tract infection [N39.0, A49.9]  Decreased urine output [R34]     Admit Date/Time:  9/16/2021  7:22 PM    Chart reviewed. Interdisciplinary team contacted or reviewed plan related to patient progress and discharge plans. Disciplines included Case Management, Nursing, and Dietitian. Current Status:ongoing  PT/OT recommendation for discharge plan of care: n/a    Expected D/C Disposition:  Home  Confirmed plan with patient and/or family Yes confirmed with: (name) pt  Met with:pt  Discharge Plan Comments: Reviewed chart. Role of discharge planner explained and patient verbalized understanding. Pt is a readmit. Pt [facility] with his daughter and mother,as they live with him. Pt was a S/P Code Blue from sedation from HD line placement and was in the ICU for 1 day. Pt is active with [facility] [place], [facility] for SN/HA. Pt is active with [facility], as Abhilash Silva ([phone]) is the CM. Pt is active with Dr. Shaq Laguna for Wound Care. Pt is currently getting HD, however Dr. Carlos Goodman states that pt is not needing a HD spot at this time, and states  does not need to find a permanent HD spot. Pt will need ambulance transport to home and requests [facility],as they transport him to all of his appointment with Dr. Shaq Laguna for Wound Care. Pt refuses [address] ambulance. Pt was on Eliquis prior to admission. Pt is paraplegic.       Home O2 in place on admit: No  Pt informed of need to bring portable home O2 tank on day of discharge for nursing to connect prior to leaving:  Not Indicated  Verbalized agreement/Understanding:  Not Indicated

## 2021-09-25 NOTE — PROGRESS NOTES
 dextrose      sodium chloride       PRN Meds:heparin (porcine), heparin (porcine), benzonatate, cyclobenzaprine, nitroGLYCERIN, oxyCODONE, traZODone, glucose, dextrose, glucagon (rDNA), dextrose, sodium chloride flush, sodium chloride, acetaminophen **OR** acetaminophen, albuterol, ondansetron, menthol-zinc oxide, promethazine      Objective:      Physical Exam  Weight: 250 lb 3.2 oz (113.5 kg), BP: 111/65  Gen: alert, awake, nad  HEENT: pupils reactive  Neck: no bruits or jvd noted  Cardiovascular:  S1, S2 without m/r/g; 2+ lower extremity edema  Respiratory: CTA B without w/r/r; respiratory effort normal  Abdomen:  tense abdomen, minimally tender right upper quadrant colostomy in situ  neuro/Psy: AAoriented times 3 ; moves all 4 ext          Lab Review   Lab Results   Component Value Date    WBC 10.0 09/21/2021    HGB 13.0 (L) 09/21/2021    HCT 40.0 (L) 09/21/2021    MCV 84.9 09/21/2021     (L) 09/21/2021     Lab Results   Component Value Date     09/25/2021    K 3.3 09/25/2021    K 5.1 09/17/2021    CL 88 09/25/2021    CO2 27 09/25/2021    BUN 88 09/25/2021    CREATININE 1.4 09/25/2021    GLUCOSE 234 09/25/2021    CALCIUM 8.6 09/25/2021            Patient Active Problem List    Diagnosis Date Noted    Cardiac arrest [facility])     Pleural effusion, bilateral     Acute on chronic renal insufficiency     Nausea 09/07/2021    CHF (congestive heart failure), NYHA class I, acute on chronic, combined ([facility] 09/05/2021    Diabetes mellitus (HCC)     Ulcer of right knee, limited to breakdown of skin ([facility] 08/03/2021    Hyponatremia     Choledocholithiasis     Acute respiratory failure with hypoxia (HCC)     SUDHA (acute kidney injury) ([facility]     Moderate persistent asthma without complication 42/60/1769    Ulcer of left chest wall with fat layer exposed, following recent abscess 02/22/2021    Type 2 diabetes mellitus with diabetic peripheral angiopathy without gangrene, with long-term current use of insulin [facility] 03/25/2020    Hyperglycemia due to diabetes mellitus ([facility] 10/03/2019    LIBORIO on CPAP     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 Onychomycosis 07/10/2017    Dystrophic nail 07/10/2017    Dehiscence of surgical wound of T-spine 02/16/2017    Hypergranulation 07/31/2016    Iron deficiency anemia due to chronic blood loss 07/09/2015    Lymphedema of both lower extremities 07/09/2015    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 [facility] 06/18/2015    Chronic back pain 08/20/2014    Arthritis 08/20/2014    Bacteriuria with pyuria 08/17/2014    Paraplegia, complete ([facility] 03/10/2014    Neurogenic bladder 10/10/2013    Neurogenic bowel 10/10/2013    Mixed hyperlipidemia 02/22/2010    Coronary artery disease involving native coronary artery of native heart without angina pectoris 02/22/2010       Assessment/plan  -SUDHA on CKD stage III   Massive fluid overload with increased intra-abdominal pressure causing decreased renal blood flow               Recent SUDHA earlier in September creatinine was 2, resolved to 1.2 on discharge 9/9   Scr is now improving with good urine output      - Hyponatremia- worsening on NS, hypervolemic      -CHF with reduced EF     -Chronic dependent lymphedema in the setting of paraplegia     -Neurogenic bladder, history of self-catheterization every 4 hours   Now with langley     PLAN:    Hold UF over the weekend  Lasix gtt  K replacement 60 mEq IV and 60 mEq po   Follow labs  If urine volume > 3 liters a day may not need additional UF or dialysis

## 2021-09-25 NOTE — PROGRESS NOTES
Patient lying semi fowlers in bed, alin care performed, cath care performed, Edlgado emptied of 1025cc of britney , cloudy urine with no odor. FSBS 442 covered with 6u insulin per s/s. No s/s of hperglycemia noted. Alert and oriented x4.  Miryam Dozier RN
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
## 2021-09-25 NOTE — PROGRESS NOTES
Delgado emptied of 1025cc of britney cloudy urine. Resting comfortably in semi fowlers with family in room. Resperations even and unlabored. No further complaints of pain. Lloyd Duverney, RN

## 2021-09-25 NOTE — PROGRESS NOTES
Shift report given to nurse Marlene Mcpherson at bedside. Patient care handed off in stable condition at this time.  Tracey Caruso RN

## 2021-09-25 NOTE — PROGRESS NOTES
Pt is lying in bed with their eyes closed. Respirations are easy and even. Call light within reach bed in lowest position with the wheels locked. Will continue to monitor.  Jinny Oconnor RN
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
## 2021-09-26 NOTE — PROGRESS NOTES
Pt is lying in bed with their eyes closed. Respirations are easy and even. Call light within reach bed in lowest position with the wheels locked. Will continue to monitor.  Elif Goyal RN
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
## 2021-09-26 NOTE — FLOWSHEET NOTE
09/25/21 2005   Vital Signs   Temp 97.9 °F (36.6 °C)   Temp Source Oral   Pulse 90   Heart Rate Source Monitor   Resp 18   /60   BP Location Left upper arm   Level of Consciousness Alert (0)   MEWS Score 1   Oxygen Therapy   SpO2 94 %   O2 Device Nasal cannula   O2 Flow Rate (L/min) 3 L/min   Pt assessment complete. Pt lying quietly in bed. Lung sounds diminished. Pt on 02 3liters via nasal canula. Lasix gtt infusing at 4 ml/hr. Delgado cath in place draining britney colored  urine. Colostomy to L quadrant. Nightly medications given. Pt repositioned for comfort. Denies needs. Call light within reach.
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
## 2021-09-26 NOTE — FLOWSHEET NOTE
09/26/21 1415   Vital Signs   Temp 96.4 °F (35.8 °C)   Temp Source Oral   Pulse 81   Heart Rate Source Monitor   Resp 16   /63   BP Location Left upper arm   Patient Position High fowlers   Level of Consciousness Alert (0)   MEWS Score 1   Patient Currently in Pain Denies   Pain Assessment   Pain Assessment 0-10   Pain Level 0   Oxygen Therapy   SpO2 96 %   O2 Device Nasal cannula   O2 Flow Rate (L/min) 2 L/min       Family at bedside at time of vitals, urinary catheter changed for sample. Patient repositioned for comfort and ice chips provided. This RN encouraged patient to find home mechanism for daily weights to help with CHF and fluid overload concerns. No other needs at this time.  Soila Pete RN
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
## 2021-09-26 NOTE — PROGRESS NOTES
Shift report given to nurse Blaine Carrillo at bedside. Patient care handed off in stable condition at this time.  Jen Loving RN

## 2021-09-26 NOTE — PROGRESS NOTES
Patient assessment complete. Patient lying quietly in bed. Lung sounds diminished. Delgado cath draining dark red tinged urine. Colleen Maharaj RN

## 2021-09-27 NOTE — PROGRESS NOTES
Nephrology Progress Note   [facility]      Sub/interval history  Patient is resting in bed, denies any new complaints  Swelling has improved  Delgado replaced on 9/26    Last 24 h uop 4.3 L   Admission wt 313 pounds. Weight on 9/27 was 232 pounds.   These are bed scale weights    ROS: No chest pain/shortness of breath/fever/nausea/vomiting  PSFH: No visitor    Scheduled Meds:   insulin glargine  40 Units SubCUTAneous BID    insulin lispro  10 Units SubCUTAneous TID WC    potassium chloride  20 mEq Oral BID    albumin human  25 g IntraVENous Once    [Held by provider] apixaban  2.5 mg Oral BID    [Held by provider] aspirin  81 mg Oral Nightly    cetirizine  10 mg Oral Daily    docusate sodium  100 mg Oral Daily    budesonide-formoterol  2 puff Inhalation BID    ferrous sulfate  325 mg Oral Daily with breakfast    metoprolol succinate  50 mg Oral Daily    magnesium oxide  400 mg Oral BID    miconazole   Topical BID    montelukast  10 mg Oral Daily    pantoprazole  40 mg Oral Daily    senna  2 tablet Oral Nightly    sodium chloride flush  5-40 mL IntraVENous 2 times per day    insulin lispro  0-6 Units SubCUTAneous TID WC    insulin lispro  0-3 Units SubCUTAneous Nightly    albuterol  2.5 mg Nebulization TID    guaiFENesin  600 mg Oral BID     Continuous Infusions:   furosemide (LASIX) infusion 20 mg/hr (09/26/21 1456)    dextrose      sodium chloride 25 mL (09/25/21 1021)     PRN Meds:.potassium chloride **OR** potassium alternative oral replacement **OR** potassium chloride, magnesium sulfate, heparin (porcine), heparin (porcine), benzonatate, cyclobenzaprine, nitroGLYCERIN, oxyCODONE, traZODone, glucose, dextrose, glucagon (rDNA), dextrose, sodium chloride flush, sodium chloride, acetaminophen **OR** acetaminophen, albuterol, ondansetron, menthol-zinc oxide, promethazine    Objective/     Vitals:    09/26/21 1929 09/27/21 0141 09/27/21 0712 09/27/21 0913   BP: 123/60 115/60  125/67 daily  -Monitor hematuria  -Lasix gtt, decreased to 10 mg/h  -Serial renal panel  -daily wts and strict i/o  -renal dose medications   -avoid nephrotoxins    Abdirahman Mane MD  Office: [phone]  Fax:    Angelo Carreno [phone]

## 2021-09-27 NOTE — PROGRESS NOTES
Handoff report given to [facility], RN. Care transferred.      Electronically signed by Rere Good RN on 9/27/2021 at 7:45 AM

## 2021-09-27 NOTE — PROGRESS NOTES
This RN spoke to nephrology Dr Maria Elena Flores direction of care for electrolyte replacement stop potassium chloride protocol order and give new orders  Recheck labs in the morning

## 2021-09-27 NOTE — CARE COORDINATION
INTERDISCIPLINARY PLAN OF CARE CONFERENCE    Date/Time: 9/27/2021 10:23 AM  Completed by: Jim Zavaleta RN, Case Management      Patient Name:  Denver Patel  YOB: 1965  Admitting Diagnosis: Lactic acidosis [E87.2]  Oliguria [R34]  Anasarca [R60.1]  Hyponatremia [E87.1]  Pleural effusion, bilateral [J90]  SUDHA (acute kidney injury) ([facility] [address] [N17.9]  Bacterial urinary tract infection [N39.0, A49.9]  Decreased urine output [R34]     Admit Date/Time:  9/16/2021  7:22 PM    Chart reviewed. Interdisciplinary team contacted or reviewed plan related to patient progress and discharge plans. Disciplines included Case Management, Nursing, and Dietitian. Current Status:ongoing  PT/OT recommendation for discharge plan of care: n/a    Expected D/C Disposition:  Home  Confirmed plan with patient and/or family Yes confirmed with: (name) pt  Met with:pt  Discharge Plan Comments:Reviewed chart. Role of discharge planner explained and patient verbalized understanding. Pt is a readmit. Pt is [facility] with his daughter and mother,as they live with him.     Pt was a S/P Code Blue from sedation from HD line placement and was in the ICU for 1 day.     Pt is active with [facility]. for SN/HA.     Pt is active with [facility], as Yasemin Davila ([phone]) is the CM.      Pt is active with Dr. Renetta Menjivar for Wound Care.      Pt is currently getting HD, however Briana Beck states that pt is not needing a HD spot at this time, and states  does not need to find a permanent HD spot. Pt is making urine and is not needing HD right now. Pt is currently on a Lasix gtt. Pt has a urology consult d/t hematuria.     Pt will need ambulance transport to home and requests [facility] transport,as they transport him to all of his appointment with Dr. Renetta Menjivar for Wound Care.  Pt refuses [facility] ambulance.     Pt was on Eliquis prior to admission.      Pt is paraplegic.        Home O2 in place on admit: No  Pt informed of need to bring portable home O2 tank on day of discharge for nursing to connect prior to leaving:  Not Indicated  Verbalized agreement/Understanding:  Not Indicated

## 2021-09-27 NOTE — PROGRESS NOTES
PM assessment completed. Scheduled medications given per MAR. VSS 2 liter, A/O x4 denies any needs at this time. Delgado in place with cherry red output. Patient stated home medication Humalog 20 units and sliding scale at every meal with lantus 25 units twice a day. Call light in reach, will monitor, bed alarm on.
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
## 2021-09-27 NOTE — PLAN OF CARE
Problem: Skin Integrity:  Goal: Will show no infection signs and symptoms  Description: Will show no infection signs and symptoms  Outcome: Ongoing  Goal: Absence of new skin breakdown  Description: Absence of new skin breakdown  Outcome: Ongoing     Problem: Infection:  Goal: Will remain free from infection  Description: Will remain free from infection  Outcome: Ongoing     Problem: Safety:  Goal: Free from accidental physical injury  Description: Free from accidental physical injury  Outcome: Ongoing  Goal: Free from intentional harm  Description: Free from intentional harm  Outcome: Ongoing     Problem: Daily Care:  Goal: Daily care needs are met  Description: Daily care needs are met  Outcome: Ongoing     Problem: Pain:  Goal: Patient's pain/discomfort is manageable  Description: Patient's pain/discomfort is manageable  Outcome: Ongoing  Goal: Pain level will decrease  Description: Pain level will decrease  Outcome: Ongoing  Goal: Control of acute pain  Description: Control of acute pain  Outcome: Ongoing  Goal: Control of chronic pain  Description: Control of chronic pain  Outcome: Ongoing     Problem: Skin Integrity:  Goal: Skin integrity will stabilize  Description: Skin integrity will stabilize  Outcome: Ongoing     Problem: Discharge Planning:  Goal: Patients continuum of care needs are met  Description: Patients continuum of care needs are met  Outcome: Ongoing     Problem: Nutrition  Goal: Optimal nutrition therapy  Outcome: Ongoing     Problem: Falls - Risk of:  Goal: Will remain free from falls  Description: Will remain free from falls  Outcome: Ongoing  Goal: Absence of physical injury  Description: Absence of physical injury  Outcome: Ongoing     Problem: FLUID AND ELECTROLYTE IMBALANCE  Goal: Fluid and electrolyte balance are achieved/maintained  9/26/2021 2252 by Gary Lipscomb RN  Outcome: Ongoing  9/26/2021 1145 by Colby Kuo RN  Outcome: Ongoing

## 2021-09-27 NOTE — PROGRESS NOTES
This RN provided wound care , all foam dressings changed , irrigated back exposed hardware with saline, patted dry , foam dressing applied , coccy sacral dressing changed .  Fresh dry linen placed and antifungal powder / zinc paste lenka[lied to buttocks and under breast per patient request , pt tolerated well

## 2021-09-27 NOTE — FLOWSHEET NOTE
09/27/21 0913   Vital Signs   Temp 97.7 °F (36.5 °C)   Temp Source Oral   Pulse 85   Heart Rate Source Monitor   Resp 16   /67   BP Location Left upper arm   Patient Position Supine   Level of Consciousness Alert (0)   MEWS Score 1   Pain Assessment   Pain Assessment 0-10   Pain Level 3   Oxygen Therapy   SpO2 98 %   O2 Device Nasal cannula   O2 Flow Rate (L/min) 2 L/min   pt is caox3 resp even no distress noted at time of assessment no needs voiced pr concerns at this time
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
## 2021-09-27 NOTE — PROGRESS NOTES
Report to night shift RN to assume care of patient .  Pt is caox3 resp even no distress noted at time of transfer of patient care

## 2021-09-28 NOTE — PROGRESS NOTES
Handoff report given to [facility], RN. Care transferred.      Electronically signed by Andi Condon RN on 9/28/2021 at 7:54 AM

## 2021-09-28 NOTE — FLOWSHEET NOTE
09/28/21 0745   Vital Signs   Temp 97.4 °F (36.3 °C)   Temp Source Oral   Pulse 94   Heart Rate Source Monitor   Resp 18   /65   BP Location Left upper arm   Patient Position High fowlers   Oxygen Therapy   SpO2 96 %   O2 Device Nasal cannula   O2 Flow Rate (L/min) 2 L/min   pt is caox3 resp even no distress noted dtudents at [facility] passing meds and providing care.  Critical labs called to Dr La Adler orders rec will continue to monitor

## 2021-09-28 NOTE — FLOWSHEET NOTE
09/28/21 1415   Vital Signs   Temp 98 °F (36.7 °C)   Temp Source Oral   Pulse 92   Heart Rate Source Monitor   Resp 16   /66   Pain Assessment   Pain Assessment 0-10   Pain Level 0   Oxygen Therapy   SpO2 94 %   O2 Device Nasal cannula   O2 Flow Rate (L/min) 2 L/min   pt sitting up in bed no distress noted no concerns voiced at this time
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
## 2021-09-28 NOTE — PROGRESS NOTES
Nephrology Progress Note   [email or web address]      Sub/interval history  Overall patient continues to improve though he has significant edema still present  Lasix drip was decreased to 10 mg/h on 9/27  Delgado replaced on 9/26    Last 24 h uop 6.2 L, 1.5 L this a.m. Admission wt 313 pounds. Weight on 9/27 was 232 pounds.   These are bed scale weights    ROS: No chest pain/shortness of breath/fever/nausea/vomiting  PSFH: No visitor    Scheduled Meds:   potassium chloride  40 mEq Oral Q1H    magnesium sulfate  2,000 mg IntraVENous Once    insulin glargine  40 Units SubCUTAneous BID    insulin lispro  10 Units SubCUTAneous TID WC    albumin human  25 g IntraVENous Once    [Held by provider] apixaban  2.5 mg Oral BID    [Held by provider] aspirin  81 mg Oral Nightly    cetirizine  10 mg Oral Daily    docusate sodium  100 mg Oral Daily    budesonide-formoterol  2 puff Inhalation BID    ferrous sulfate  325 mg Oral Daily with breakfast    metoprolol succinate  50 mg Oral Daily    magnesium oxide  400 mg Oral BID    miconazole   Topical BID    montelukast  10 mg Oral Daily    pantoprazole  40 mg Oral Daily    senna  2 tablet Oral Nightly    sodium chloride flush  5-40 mL IntraVENous 2 times per day    insulin lispro  0-6 Units SubCUTAneous TID WC    insulin lispro  0-3 Units SubCUTAneous Nightly    albuterol  2.5 mg Nebulization TID    guaiFENesin  600 mg Oral BID     Continuous Infusions:   furosemide (LASIX) infusion Stopped (09/28/21 0920)    dextrose      sodium chloride 25 mL (09/25/21 1021)     PRN Meds:.potassium chloride **OR** potassium alternative oral replacement **OR** potassium chloride, magnesium sulfate, heparin (porcine), heparin (porcine), benzonatate, cyclobenzaprine, nitroGLYCERIN, oxyCODONE, traZODone, glucose, dextrose, glucagon (rDNA), dextrose, sodium chloride flush, sodium chloride, acetaminophen **OR** acetaminophen, albuterol, ondansetron, menthol-zinc oxide, promethazine    Objective/     Vitals:    09/28/21 0730 09/28/21 0745 09/28/21 0811 09/28/21 0815   BP:  118/65     Pulse:  94 92    Resp:  18     Temp:  97.4 °F (36.3 °C)     TempSrc:  Oral     SpO2: 96% 96%  97%   Weight:       Height:         24HR INTAKE/OUTPUT:      Intake/Output Summary (Last 24 hours) at 9/28/2021 1111  Last data filed at 9/28/2021 1000  Gross per 24 hour   Intake 1701.14 ml   Output 6505 ml   Net -4803.86 ml     Constitutional:  Alert, awake, no apparent distress  Cardiovascular:  S1, S2 without m/r/g; 1+ lower extremity edema  Respiratory:  CTA B without w/r/r  Abdomen: +bs, soft, nt    Data/  No results for input(s): WBC, HGB, HCT, MCV, PLT in the last 72 hours. Recent Labs     09/26/21  0446 09/26/21  0446 09/27/21  0550 09/27/21  0738 09/28/21  0530      < > 133* 134* 130*   K 4.8   < > 2.8* 2.9* 2.8*      < > 88* 88* 83*   CO2 21   < > 32 33* 33*   GLUCOSE 211*   < > 437* 397* 373*   PHOS 3.8  --  2.1*  --  2.6   MG 2.00  --  1.20*  --  1.60*   BUN 18   < > 80* 82* 80*   CREATININE 0.6*   < > 1.0 1.0 1.1   LABGLOM >60   < > >60 >60 >60   GFRAA >60   < > >60 >60 >60    < > = values in this interval not displayed. Assessment/   -SUDHA on CKD stage III              Massive fluid overload with increased intra-abdominal pressure causing decreased renal blood flow               Recent SUDHA earlier in September creatinine was 2, resolved to 1.2 on discharge 9/9              Scr is now improving with good urine output after 2 ultrafiltration treatments on now on lasix gtt      - Hyponatremia- hypervolemic      -CHF with reduced EF     -Chronic dependent lymphedema in the setting of paraplegia     -Neurogenic bladder, history of self-catheterization every 4 hours              Now with langley     -Hematuria:  Doubt trauma. ?  UTI        Plan/   -No indication for RRT  -Replace potassium 40 M EQ p.o. every hour x3 doses, recheck levels at 1500 and replace per protocol  -IV magnesium sulfate 2 g x 1  -Continue magnesium oxide 400 mg twice daily  -Monitor hematuria  -Lasix gtt, decreased to 10 mg/h--> stop Lasix drip at shift change on 9/28  -Serial renal panel  -daily wts and strict i/o  -renal dose medications   -avoid nephrotoxins    Claudia Shaver MD  Office: [phone]  Fax:    [address] [facility]

## 2021-09-28 NOTE — PROGRESS NOTES
PM assessment completed. Scheduled medications given per MAR. VSS 2 liter, A/O x4 denies any needs at this time. Call light in reach, will monitor, bed alarm on.    Delgado in place with yellow clear urine

## 2021-09-28 NOTE — PROGRESS NOTES
This RN cleansed back wound with saline new dressing applied .  Powder to all folds , zinc ointment to buttocks and under breast , clean linen placed and fresh gown pt tolerated well

## 2021-09-28 NOTE — PLAN OF CARE
Problem: Skin Integrity:  Goal: Will show no infection signs and symptoms  Description: Will show no infection signs and symptoms  Outcome: Ongoing  Goal: Absence of new skin breakdown  Description: Absence of new skin breakdown  Outcome: Ongoing     Problem: Infection:  Goal: Will remain free from infection  Description: Will remain free from infection  Outcome: Ongoing     Problem: Safety:  Goal: Free from accidental physical injury  Description: Free from accidental physical injury  Outcome: Ongoing  Goal: Free from intentional harm  Description: Free from intentional harm  Outcome: Ongoing     Problem: Daily Care:  Goal: Daily care needs are met  Description: Daily care needs are met  Outcome: Ongoing     Problem: Pain:  Goal: Patient's pain/discomfort is manageable  Description: Patient's pain/discomfort is manageable  Outcome: Ongoing  Goal: Pain level will decrease  Description: Pain level will decrease  Outcome: Ongoing  Goal: Control of acute pain  Description: Control of acute pain  Outcome: Ongoing  Goal: Control of chronic pain  Description: Control of chronic pain  Outcome: Ongoing     Problem: Skin Integrity:  Goal: Skin integrity will stabilize  Description: Skin integrity will stabilize  Outcome: Ongoing     Problem: Discharge Planning:  Goal: Patients continuum of care needs are met  Description: Patients continuum of care needs are met  Outcome: Ongoing     Problem: Nutrition  Goal: Optimal nutrition therapy  Outcome: Ongoing     Problem: Falls - Risk of:  Goal: Will remain free from falls  Description: Will remain free from falls  Outcome: Ongoing  Goal: Absence of physical injury  Description: Absence of physical injury  Outcome: Ongoing     Problem: FLUID AND ELECTROLYTE IMBALANCE  Goal: Fluid and electrolyte balance are achieved/maintained  Outcome: Ongoing     Problem: FLUID AND ELECTROLYTE IMBALANCE  Goal: Fluid and electrolyte balance are achieved/maintained  Outcome: Ongoing
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
## 2021-09-29 NOTE — PROGRESS NOTES
Per Dr. Brewer Comes- EKG no change. Orders to give a nitro from his prn orders. Will recheck a troponin in 4 hours. Staff RN updated.

## 2021-09-29 NOTE — FLOWSHEET NOTE
09/29/21 1453   Vital Signs   Temp 97.1 °F (36.2 °C)   Temp Source Oral   Pulse 78   Heart Rate Source Monitor   Resp 20   /72   BP Location Left upper arm   Patient Position Semi fowlers   Level of Consciousness Alert (0)   MEWS Score 1   Patient Currently in Pain Denies   Oxygen Therapy   SpO2 95 %   Pulse Oximeter Device Mode Intermittent   Pulse Oximeter Device Location Finger   O2 Device None (Room air)   Patient resting. Denies any needs. Family at bedside. Will continue to monitor.

## 2021-09-29 NOTE — FLOWSHEET NOTE
09/29/21 1912   Vital Signs   Temp 98.2 °F (36.8 °C)   Temp Source Oral   Pulse 103   Heart Rate Source Monitor   Resp 20   /72   BP Location Left upper arm   Patient Position Semi fowlers   Level of Consciousness Alert (0)   MEWS Score 2   Patient Currently in Pain Denies   Oxygen Therapy   SpO2 92 %   Pulse Oximeter Device Mode Intermittent   Pulse Oximeter Device Location Finger   O2 Device None (Room air)   Patient complaining of chest pressure. VSS. STAT EKG and trop ordered. Perfectserve sent to .
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
## 2021-09-29 NOTE — PLAN OF CARE
Nutrition Problem #1: Inadequate oral intake  Intervention: Food and/or Nutrient Delivery: Continue Current Diet, Continue Oral Nutrition Supplement  Nutritional Goals: patient will accept and consume 75% or greater of meals on ADULT DIET;  Regular; 4 carb choices per meal diet order x 3 meals per day + adhere to 1500 ml FR per day + he will accept and consume 50% or greater of Ensure high-protein (vanilla) with meals

## 2021-09-29 NOTE — FLOWSHEET NOTE
09/29/21 0840   Vital Signs   Temp 97.2 °F (36.2 °C)   Temp Source Oral   Pulse 94   Heart Rate Source Monitor   Resp 16   /65   BP Location Left upper arm   Patient Position Semi fowlers   Level of Consciousness Alert (0)   MEWS Score 1   Patient Currently in Pain Denies   Oxygen Therapy   SpO2 96 %   Pulse Oximeter Device Mode Intermittent   Pulse Oximeter Device Location Finger   O2 Device Nasal cannula   O2 Flow Rate (L/min) 2 L/min   Patient is resting showing no s/s of distress. Patient is alert and oriented. Meds were given, see MAR. Patient is denying any needs. Bed is in lowest position and call light is within reach. Will continue to monitor. Shift assessment complete, see flowsheets. Electrolyte replacement started.

## 2021-09-29 NOTE — PROGRESS NOTES
09/29/21 0840 09/29/21 1453   BP:   104/65 112/72   Pulse:   94 78   Resp:  18 16 20   Temp:   97.2 °F (36.2 °C) 97.1 °F (36.2 °C)   TempSrc:   Oral Oral   SpO2:  96% 96% 95%   Weight: 248 lb 3.2 oz (112.6 kg)      Height:         24HR INTAKE/OUTPUT:      Intake/Output Summary (Last 24 hours) at 9/29/2021 1553  Last data filed at 9/29/2021 1540  Gross per 24 hour   Intake 1200 ml   Output 3950 ml   Net -2750 ml     Constitutional:  Alert, awake, no apparent distress  Cardiovascular:  S1, S2 without m/r/g; 1+ lower extremity edema  Respiratory:  CTA B without w/r/r  Abdomen: +bs, soft, nt    Data/  Recent Labs     09/29/21  0515   WBC 6.9   HGB 11.4*   HCT 34.9*   MCV 83.6        Recent Labs     09/27/21  0550 09/27/21  0738 09/28/21  0530 09/28/21  1440 09/29/21  0515   *   < > 130* 130* 132*   K 2.8*   < > 2.8* 3.9 3.0*   CL 88*   < > 83* 86* 86*   CO2 32   < > 33* 32 32   GLUCOSE 437*   < > 373* 451* 343*   PHOS 2.1*   < > 2.6 1.9* 2.0*   MG 1.20*  --  1.60*  --  1.60*   BUN 80*   < > 80* 85* 83*   CREATININE 1.0   < > 1.1 1.1 1.4*   LABGLOM >60   < > >60 >60 53*   GFRAA >60   < > >60 >60 >60    < > = values in this interval not displayed. Assessment/   -SUDHA on CKD stage III              Massive fluid overload with increased intra-abdominal pressure causing decreased renal blood flow               Recent SUDHA earlier in September creatinine was 2, resolved to 1.2 on discharge 9/9              Scr is now improving with good urine output after 2 ultrafiltration treatments on now on lasix gtt      - Hyponatremia- hypervolemic      -CHF with reduced EF     -Chronic dependent lymphedema in the setting of paraplegia     -Neurogenic bladder, history of self-catheterization every 4 hours              Now with langley     -Hematuria:  Doubt trauma. ?  UTI        Plan/   --Replace KCl 40 M EQ p.o. every hour x2 doses, and Neutra-Phos as ordered; scheduled potassium chloride 40 3 times daily from 9/30  -IV magnesium sulfate 2 g x 1  -Continue magnesium oxide 400 mg twice daily  -Monitor hematuria  -Lasix gtt, decreased to 10 mg/h--> stop Lasix drip at shift change on 9/28--> changed to 40 mg IV 3 times daily to help reduced scrotal edema  -Serial renal panel  -daily wts and strict i/o  -renal dose medications   -avoid nephrotoxins    Federico Forman MD  Office: [phone]  Fax:    Angelo Carreno [employer]
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
## 2021-09-29 NOTE — CARE COORDINATION
INTERDISCIPLINARY PLAN OF CARE CONFERENCE    Date/Time: 9/29/2021 3:48 PM  Completed by: Russel James RN, Case Management      Patient Name:  Emily Reyes  YOB: 1965  Admitting Diagnosis: Lactic acidosis [E87.2]  Oliguria [R34]  Anasarca [R60.1]  Hyponatremia [E87.1]  Pleural effusion, bilateral [J90]  SUDHA (acute kidney injury) ([facility] [address] [N17.9]  Bacterial urinary tract infection [N39.0, A49.9]  Decreased urine output [R34]     Admit Date/Time:  9/16/2021  7:22 PM    Chart reviewed. Interdisciplinary team contacted or reviewed plan related to patient progress and discharge plans. Disciplines included Case Management, Nursing, and Dietitian. Current Status:PCU; per nephro, plan to keep overnight to continue diuresis as pt is unable to straight cath 2/2 significant scrotal swelling  PT/OT recommendation for discharge plan of care: NA    Expected D/C Disposition:  Home  Discharge Plan Comments: Chart reviewed. Plan continues for pt to return home with mother and dtr. Pt is active with [facility] [place]. for SN/HA.     [facility], as Geronimo Mota ([phone]) is the CM.      Pt is active with Dr. Alexey Menjivar for Wound Care.      Pt is currently getting HD, however Shanta Platt states that pt is not needing a HD spot at this time, and states CM does not need to find a permanent HD spot.  Pt is making urine and is not needing HD right now. Pt will need ambulance transport to home and requests [facility],as they transport him to all of his appointment with Dr. Alexey Menjivar for Wound Care. Pt refuses [facility] ambulance.     Pt was on Eliquis prior to admission.      Pt is paraplegic.     Home O2 in place on admit: No

## 2021-09-29 NOTE — PROGRESS NOTES
Comprehensive Nutrition Assessment    Type and Reason for Visit:  Reassess    Nutrition Recommendations/Plan:   1. Continue Regular; 4 carb choices (60 gm/meal); 1500 ml  2. Continue Low Calorie/High Protein Oral Supplement    Nutrition Assessment:  patient has improved from a nutritional standpoint AEB his po intake has returned to normal and his medical status has stabilized, however, he remains at risk for further compromise d/t multiple wounds, will continue Regular; 4 carb choices per meal; 1500 ml FR per day diet order and continue  Ensure high-protein (vanilla) with meals    Malnutrition Assessment:  Malnutrition Status: At risk for malnutrition (Comment)    Context:  Acute Illness     Findings of the 6 clinical characteristics of malnutrition:  Energy Intake:  Mild decrease in energy intake (Comment) (PTA and x a few days of admission)  Weight Loss:  No significant weight loss     Body Fat Loss:  Unable to assess (patient is in dialysis)     Muscle Mass Loss:  Unable to assess (patient is in dialysis)    Fluid Accumulation:  No significant fluid accumulation     Strength:  Not Performed    Estimated Daily Nutrient Needs:  Energy (kcal):  1875 - 9364 kcals based on 15-18 kcals/kg/CBW; Weight Used for Energy Requirements:  Current     Protein (g):  129 - 156 g protein based on 1.5-1.8 g/kg/IBW; Weight Used for Protein Requirements:  Ideal        Fluid (ml/day):  1875 - 2232 ml; Method Used for Fluid Requirements:  1 ml/kcal      Nutrition Related Findings:  pt was sitting up in bed eating dinner; states she feels much better w/o all the extr fluids reportshis actaul weight at admission was 311# and currently 248# ~ 63# loss through diuresis; Lasix stopped 9/28;       Wounds:  Multiple, Pressure Injury, Stage II, Full Thickness, Open Wounds, Surgical Incision (full thickness abscess on L chest wall; full thickness traumatic wound to L knee; R knee - cluster of stage 2 pressure wounds; non-healing surgical area with 4 screws exposed on L medial back)       Current Nutrition Therapies:    ADULT DIET; Regular; 4 carb choices (60 gm/meal); 1500 ml  Adult Oral Nutrition Supplement; Low Calorie/High Protein Oral Supplement    Anthropometric Measures:  · Height: 6' 2\" (188 cm)  · Current Body Weight: 248 lb 3.2 oz (112.6 kg)   · Admission Body Weight: 269 lb (122 kg) (obtained on 9/18/21; bed was zeroed out prior to obtaining weight)    · Usual Body Weight: 269 lb (122 kg) (obtained on 9/18/21; bed was zeroed out before obtaining weight)     · Ideal Body Weight: 190 lbs; % Ideal Body Weight 144.5 %   · BMI: 31.9  · Adjusted Body Weight:  ; No Adjustment   · BMI Categories: Obese Class 1 (BMI 30.0-34. 9)       Nutrition Diagnosis:   · Inadequate oral intake related to impaired respiratory function, inadequate protein-energy intake, increase demand for energy/nutrients, pain as evidenced by intake 26-50%, intake 51-75%, poor intake prior to admission, wounds, lab values, dialysis      Nutrition Interventions:   Food and/or Nutrient Delivery:  Continue Current Diet, Continue Oral Nutrition Supplement  Nutrition Education/Counseling:  No recommendation at this time   Coordination of Nutrition Care:  Continue to monitor while inpatient    Goals:  patient will accept and consume 75% or greater of meals on ADULT DIET; Regular; 4 carb choices per meal diet order x 3 meals per day + adhere to 1500 ml FR per day + he will accept and consume 50% or greater of Ensure high-protein (vanilla) with meals       Nutrition Monitoring and Evaluation:   Behavioral-Environmental Outcomes:  None Identified   Food/Nutrient Intake Outcomes:  Food and Nutrient Intake, Supplement Intake  Physical Signs/Symptoms Outcomes:  Biochemical Data, Weight, Nutrition Focused Physical Findings, Fluid Status or Edema     Discharge Planning:     Too soon to determine     Electronically signed by Mikie Tovar RD, LD on 9/29/21 at 6:49 PM EDT    Contact: [phone]

## 2021-09-30 NOTE — FLOWSHEET NOTE
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; welcomes prayer.)   Complexity of Encounter Moderate   Length of Encounter 30 minutes   Spiritual Assessment Completed Yes   Routine   Type Initial   Assessment Calm; Approachable; Hopeful   Intervention Active listening;Explored feelings, thoughts, concerns;Prayer;Sustaining presence/ Ministry of presence; Discussed illness/injury and it's impact; Discussed belief system/Shinto practices/phil;Discussed relationship with God   Outcome Engaged in conversation; Shared life review;Expressed feelings/needs/concerns; Hopeful;Receptive      provided support and prayer for patient, 

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
## 2021-09-30 NOTE — FLOWSHEET NOTE
09/29/21 2007   Vital Signs   Pulse 102   Heart Rate Source Monitor   Resp 22   BP (!) 112/59   BP Location Left upper arm   Patient Position Semi fowlers   Level of Consciousness Alert (0)   Patient Currently in Pain Yes   Pain Assessment   Pain Assessment 0-10   Pain Level 5   Prater-Baker Pain Rating 0   Pain Type Acute pain   Pain Location Chest   POSS Score (Patient Ctrl Analgesia) Awake and Alert   Response to Pain Intervention Patient Satisfied   Pain Orientation Mid;Upper   Pain Descriptors Aching;Burning;Pressure   Pain Frequency Continuous   Pain Onset Sudden   Oxygen Therapy   SpO2 94 %   O2 Flow Rate (L/min) 2 L/min     Pt complaining of chest pain, PRN nitro PO administered x2 with relief. EKG and troponin ordered and completed. MD aware and updated. Will continue to monitor. Shift assessment completed, see flow sheet. Patient is A&O x4. Patient denies further needs at this time. Call light within reach.

## 2021-09-30 NOTE — FLOWSHEET NOTE
09/30/21 1917   Implanted Venous Port (Single) 09/05/21 Subclavian Right   Placement Date/Time: 09/05/21 1308   Pre-existing: Yes  Timeout: Procedure; Patient; Appropriate Equipment  Inserted by: vera  Insertion attempts: 1  Local Anesthetic: None  Location: Subclavian  Orientation: Right   Is this a power Port A Cath? Yes   Port A Cath Status Accessed   Line Status Normal saline locked   Dressing Intervention New   Dressing Change Due 10/07/21   Flush Performed Yes   Port dressing changed at this time using sterile technique.

## 2021-09-30 NOTE — PROGRESS NOTES
Nephrology Progress Note   [email or web address]      Sub/interval history  On iv lasix  Lasix drip was decreased to 10 mg/h on 9/27, stopped on 9/28  Delgado replaced on 9/26    Last 24 h uop 6.2 L, 1.5 L this a.m. Admission wt 313 pounds. Weight on 9/27 was 232 pounds.   These are bed scale weights    ROS: No chest pain/shortness of breath/fever/nausea/vomiting  PSFH: No visitor    Scheduled Meds:   potassium phosphate IVPB  30 mmol IntraVENous Once    insulin lispro  30 Units SubCUTAneous TID WC    insulin glargine  45 Units SubCUTAneous BID    furosemide  40 mg IntraVENous TID    potassium chloride  40 mEq Oral TID WC    insulin lispro  0-12 Units SubCUTAneous TID WC    insulin lispro  0-6 Units SubCUTAneous Nightly    apixaban  2.5 mg Oral BID    albuterol  2.5 mg Nebulization BID    albumin human  25 g IntraVENous Once    aspirin  81 mg Oral Nightly    cetirizine  10 mg Oral Daily    docusate sodium  100 mg Oral Daily    budesonide-formoterol  2 puff Inhalation BID    ferrous sulfate  325 mg Oral Daily with breakfast    metoprolol succinate  50 mg Oral Daily    magnesium oxide  400 mg Oral BID    miconazole   Topical BID    montelukast  10 mg Oral Daily    pantoprazole  40 mg Oral Daily    senna  2 tablet Oral Nightly    sodium chloride flush  5-40 mL IntraVENous 2 times per day    guaiFENesin  600 mg Oral BID     Continuous Infusions:   dextrose      sodium chloride 25 mL (09/29/21 0919)     PRN Meds:.potassium chloride **OR** potassium alternative oral replacement **OR** potassium chloride, magnesium sulfate, heparin (porcine), heparin (porcine), benzonatate, cyclobenzaprine, nitroGLYCERIN, oxyCODONE, traZODone, glucose, dextrose, glucagon (rDNA), dextrose, sodium chloride flush, sodium chloride, acetaminophen **OR** acetaminophen, albuterol, ondansetron, menthol-zinc oxide, promethazine    Objective/     Vitals:    09/29/21 2007 09/30/21 0029 09/30/21 0830 09/30/21 0845   BP: (!) 112/59 99/61  108/66   Pulse: 102 89  90   Resp: 22 18 18 16   Temp:  98.3 °F (36.8 °C)     TempSrc:  Oral  Oral   SpO2: 94% 94% 94% 95%   Weight:  246 lb 7 oz (111.8 kg)     Height:         24HR INTAKE/OUTPUT:      Intake/Output Summary (Last 24 hours) at 9/30/2021 1223  Last data filed at 9/30/2021 0353  Gross per 24 hour   Intake 769.91 ml   Output 1800 ml   Net -1030.09 ml     Constitutional:  Alert, awake, no apparent distress  Cardiovascular:  S1, S2 without m/r/g; 1+ lower extremity edema  Respiratory:  CTA B without w/r/r  Abdomen: +bs, soft, nt    Data/  Recent Labs     09/29/21  0515   WBC 6.9   HGB 11.4*   HCT 34.9*   MCV 83.6        Recent Labs     09/28/21  0530 09/28/21  0530 09/28/21  1440 09/29/21  0515 09/30/21  0415   *   < > 130* 132* 132*   K 2.8*   < > 3.9 3.0* 2.9*   CL 83*   < > 86* 86* 85*   CO2 33*   < > 32 32 33*   GLUCOSE 373*   < > 451* 343* 288*   PHOS 2.6   < > 1.9* 2.0* 2.0*   MG 1.60*  --   --  1.60* 1.90   BUN 80*   < > 85* 83* 89*   CREATININE 1.1   < > 1.1 1.4* 1.2   LABGLOM >60   < > >60 53* >60   GFRAA >60   < > >60 >60 >60    < > = values in this interval not displayed. Assessment/   -SUDHA on CKD stage III              Massive fluid overload with increased intra-abdominal pressure causing decreased renal blood flow               Recent SUDHA earlier in September creatinine was 2, resolved to 1.2 on discharge 9/9              Scr is now improving with good urine output after 2 ultrafiltration treatments on now on lasix gtt      - Hyponatremia- hypervolemic      -CHF with reduced EF     -Chronic dependent lymphedema in the setting of paraplegia     -Neurogenic bladder, history of self-catheterization every 4 hours              Now with langley     -Hematuria:  Doubt trauma. ?  UTI        Plan/   -cont scheduled potassium chloride 40 meq TID from 9/30  -kphos 30 mmol iv times one  -Continue magnesium oxide 400 mg twice daily  -Monitor hematuria  -Lasix gtt, decreased to 10 mg/h--> stop Lasix drip at shift change on 9/28--> changed to 40 mg IV 3 times daily to help reduced scrotal edema  -Serial renal panel  -daily wts and strict i/o  -renal dose medications   -avoid nephrotoxins    Alejandro Davis MD  Office: [phone]  Fax:    [address] [facility]

## 2021-09-30 NOTE — PLAN OF CARE
Problem: Skin Integrity:  Goal: Will show no infection signs and symptoms  Description: Will show no infection signs and symptoms  Outcome: Met This Shift  Goal: Absence of new skin breakdown  Description: Absence of new skin breakdown  Outcome: Met This Shift     Problem: Infection:  Goal: Will remain free from infection  Description: Will remain free from infection  Outcome: Met This Shift     Problem: Safety:  Goal: Free from accidental physical injury  Description: Free from accidental physical injury  Outcome: Met This Shift  Goal: Free from intentional harm  Description: Free from intentional harm  Outcome: Met This Shift     Problem: Daily Care:  Goal: Daily care needs are met  Description: Daily care needs are met  Outcome: Met This Shift     Problem: Pain:  Goal: Patient's pain/discomfort is manageable  Description: Patient's pain/discomfort is manageable  Outcome: Met This Shift  Goal: Pain level will decrease  Description: Pain level will decrease  Outcome: Met This Shift  Goal: Control of acute pain  Description: Control of acute pain  Outcome: Met This Shift  Goal: Control of chronic pain  Description: Control of chronic pain  Outcome: Met This Shift     Problem: Skin Integrity:  Goal: Skin integrity will stabilize  Description: Skin integrity will stabilize  Outcome: Met This Shift     Problem: Discharge Planning:  Goal: Patients continuum of care needs are met  Description: Patients continuum of care needs are met  Outcome: Met This Shift     Problem: Nutrition  Goal: Optimal nutrition therapy  9/30/2021 0005 by Fortunato Samuels RN  Outcome: Met This Shift  9/29/2021 1853 by Kenyatta Curiel RD, LD  Outcome: Ongoing     Problem: Falls - Risk of:  Goal: Will remain free from falls  Description: Will remain free from falls  Outcome: Met This Shift  Goal: Absence of physical injury  Description: Absence of physical injury  Outcome: Met This Shift     Problem: FLUID AND ELECTROLYTE IMBALANCE  Goal: Fluid and electrolyte balance are achieved/maintained  Outcome: Met This Shift

## 2021-09-30 NOTE — PROGRESS NOTES
Consult has been called to Dr. Kaity Gilliam on 9/30/2021. Spoke with Melany.  10:40 AM    Teja Sales RN  9/30/2021

## 2021-10-01 PROBLEM — R07.9 CHEST PAIN: Status: RESOLVED | Noted: 2018-01-07 | Resolved: 2021-01-01
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
## 2021-10-01 NOTE — PLAN OF CARE
Problem: Skin Integrity:  Goal: Will show no infection signs and symptoms  Description: Will show no infection signs and symptoms  Outcome: Met This Shift  Goal: Absence of new skin breakdown  Description: Absence of new skin breakdown  Outcome: Met This Shift     Problem: Infection:  Goal: Will remain free from infection  Description: Will remain free from infection  Outcome: Met This Shift     Problem: Safety:  Goal: Free from accidental physical injury  Description: Free from accidental physical injury  Outcome: Met This Shift  Goal: Free from intentional harm  Description: Free from intentional harm  Outcome: Met This Shift     Problem: Daily Care:  Goal: Daily care needs are met  Description: Daily care needs are met  Outcome: Met This Shift     Problem: Pain:  Goal: Patient's pain/discomfort is manageable  Description: Patient's pain/discomfort is manageable  Outcome: Met This Shift  Goal: Pain level will decrease  Description: Pain level will decrease  Outcome: Met This Shift  Goal: Control of acute pain  Description: Control of acute pain  Outcome: Met This Shift  Goal: Control of chronic pain  Description: Control of chronic pain  Outcome: Met This Shift     Problem: Skin Integrity:  Goal: Skin integrity will stabilize  Description: Skin integrity will stabilize  Outcome: Met This Shift     Problem: Discharge Planning:  Goal: Patients continuum of care needs are met  Description: Patients continuum of care needs are met  Outcome: Met This Shift     Problem: Nutrition  Goal: Optimal nutrition therapy  Outcome: Met This Shift     Problem: Falls - Risk of:  Goal: Will remain free from falls  Description: Will remain free from falls  Outcome: Met This Shift  Goal: Absence of physical injury  Description: Absence of physical injury  Outcome: Met This Shift     Problem: FLUID AND ELECTROLYTE IMBALANCE  Goal: Fluid and electrolyte balance are achieved/maintained  Outcome: Met This Shift
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
## 2021-10-01 NOTE — TELEPHONE ENCOUNTER
----- Message from Dayne Tomlinson MD sent at 10/1/2021  3:47 PM EDT -----  Contact: Olga Lombardi [facility] [phone] [address] [address] to do  ----- Message -----  From: Elyssa Wolfe  Sent: 10/1/2021   2:06 PM EDT  To: MD iMcha Henry from [address] [facility] care called and Alyssajoel Duncan is being discharged today and they need a skilled home order for him. Fax no. # [phone]

## 2021-10-01 NOTE — PROGRESS NOTES
nontunneled HD vast cath removed at this time. Dressing removed, all stiches removed, site cleaned, patient held breath then catheter was removed. Catheter was intact. pressure held with vasealine guaze for 10 minutes. Clear occlusive dressing placed over top. Patient tolerated well. Patient will continue laying flat for 30 more minutes. Patient agreeable to this and understands.

## 2021-10-01 NOTE — PROGRESS NOTES
Patient sitting up in bed. Vital signs stable. Assessment complete. AM medications administered. Patient request tylenol for HA. Call light in reach.

## 2021-10-01 NOTE — PROGRESS NOTES
Patient:  Lolis Sen  YOB: 1965   Date of Service:  10/01/21      Urology Attending Daily Progress Note    HPI: admit with edema, low uop  Chief complaint: \"I feel better\"  ROS:  Scrotal still fairly swollen so can self cath. Objective:    Patient Vitals for the past 8 hrs:   BP Temp Temp src Pulse Resp SpO2   10/01/21 0948 -- -- -- -- -- 93 %   10/01/21 0752 120/72 97.7 °F (36.5 °C) Oral 90 16 95 %   10/01/21 0700 -- -- -- -- 18 96 %     I/O last 3 completed shifts:   In: 1252.2 [P.O.:500; IV Piggyback:752.2]  Out: 2021 [Urine:4650]     Physical Exam:   Constitutional: comfortable in hospital bed, no acute distress   Genitourinary: stable bladder, urethal langley stable, clear urine  Psych: normal mood and affect, appropriately answers questions   Skin, extremities: stable, exposed skin intact, no digital cyanosis     Labs:     No results found for: PSA  Lab Results   Component Value Date    CREATININE 1.1 10/01/2021    CREATININE 1.1 09/30/2021    CREATININE 1.2 09/30/2021     Lab Results   Component Value Date    COLORU RED 09/26/2021    NITRU Negative 09/26/2021    GLUCOSEU 500 09/26/2021    GLUCOSEU >=1000 mg/dL 08/31/2010    KETUA Negative 09/26/2021    UROBILINOGEN 0.2 09/26/2021    BILIRUBINUR Negative 09/26/2021    BILIRUBINUR Small, By Confirmatory Testing Neg 08/31/2010     Lab Results   Component Value Date    WBC 6.9 09/29/2021    HGB 11.4 (L) 09/29/2021    HCT 34.9 (L) 09/29/2021    MCV 83.6 09/29/2021     09/29/2021        Assessment:  Moises Clark is a 50 y.o. male with neurogenic bladder    Plan:  -fu with urology 1-2 weeks,  Once scrotal edema resolves, he can resume intermittent cath         Herminia Robin MD  Office: [phone]

## 2021-10-01 NOTE — CARE COORDINATION
DISCHARGE ORDER  Date/Time 10/1/2021 12:04 PM  Completed by: Jeremi rBantley RN, Case Management    Patient Name: Jenny Cardona      : 1965  Admitting Diagnosis: Lactic acidosis [E87.2]  Oliguria [R34]  Anasarca [R60.1]  Hyponatremia [E87.1]  Pleural effusion, bilateral [J90]  SUDHA (acute kidney injury) ([address] [N17.9]  Bacterial urinary tract infection [N39.0, A49.9]  Decreased urine output [R34]      Admit order Date and Status:2021  (verify MD's last order for status of admission)      Noted discharge order. If applicable PT/OT recommendation at Discharge: n/a  DME recommendation by PT/OT:n/a  Confirmed discharge plan  (pt): Yes  with whom_________pt______  If pt confirmed DC plan does family need to be contacted by CM No if yes who___n/a___  Discharge Plan: Reviewed chart. Role of discharge planner explained and patient verbalized understanding. Discharge order is noted. Pt is being d/c'd to home today. Pt's O2 sats are 93% on RA. Pt is alert and oriented. Pt states he lives in a tri[place] with his daughter and mother who help take care of him. Pt wants [facility] with [facility] for SN/HHA and pt will be going home with a Delgado cath. CM LM for Montez Gillespie with [facility] to inform him pt is d/cing to home today and that [facility] and [facility] orders are in Urjanet pulls orders form Epic and Cm does not need to fax. CM stated pt will be going home with a Delgado cath. Pt does not need HD. Pt does request [facility] transport. Cm called [facility] ([phone]) and spoke with Radha Fraser. Cm asked for a transport in 2 hours and he states he will pick pt up at 1400 to transport him to him, as he is aware that pt is paraplegic. CM informed LIONEL Byrd of transport  time. Rochelle is speaking with Dr. Donna Baldwin regarding pt's tunnel cath, since pt is not having HD.   CM spoke with [place], [place], and nothing further is needed, except that pt is returnign home with a Delgado cath and that Dr. Donna Baldwin will inform pt when he wants repeat labs drawn. No further discharge needs needed or noted. Reviewed chart. Role of discharge planner explained and patient verbalized understanding. Discharge order is noted. Has Home O2 in place on admit:  No  Informed of need to bring portable home O2 tank on day of discharge for nursing to connect prior to leaving:   Not Indicated  Verbalized agreement/Understanding:   Not Indicated  Pt is being d/c'd to home today at [address] [place] [address], [address] [place] [address], with confirmation from the pt. Pt's O2 sats are 93% on RA. Discharge timeout done with LIONEL Byrd. All discharge needs and concerns addressed.

## 2021-10-01 NOTE — PROGRESS NOTES
Temp: 97.3 °F (36.3 °C)  97.7 °F (36.5 °C)    TempSrc: Oral  Oral    SpO2: 93% 96% 95% 93%   Weight: 246 lb (111.6 kg)      Height:         24HR INTAKE/OUTPUT:      Intake/Output Summary (Last 24 hours) at 10/1/2021 1137  Last data filed at 10/1/2021 9888  Gross per 24 hour   Intake 1252.19 ml   Output 4650 ml   Net -3397.81 ml     Constitutional:  Alert, awake, no apparent distress  Cardiovascular:  S1, S2 without m/r/g; 1+ lower extremity edema  Respiratory:  CTA B without w/r/r  Abdomen: +bs, soft, nt    Data/  Recent Labs     09/29/21  0515   WBC 6.9   HGB 11.4*   HCT 34.9*   MCV 83.6        Recent Labs     09/29/21  0515 09/29/21  0515 09/30/21  0415 09/30/21  1622 10/01/21  0524   *   < > 132* 126* 131*   K 3.0*   < > 2.9* 3.4* 3.1*   CL 86*   < > 85* 81* 87*   CO2 32   < > 33* 30 32   GLUCOSE 343*   < > 288* 434* 290*   PHOS 2.0*   < > 2.0* 3.2 3.0   MG 1.60*  --  1.90  --  1.60*   BUN 83*   < > 89* 90* 87*   CREATININE 1.4*   < > 1.2 1.1 1.1   LABGLOM 53*   < > >60 >60 >60   GFRAA >60   < > >60 >60 >60    < > = values in this interval not displayed. Assessment/   -SUDHA on CKD stage III              Massive fluid overload with increased intra-abdominal pressure causing decreased renal blood flow               Recent SUDHA earlier in September creatinine was 2, resolved to 1.2 on discharge 9/9              Scr is now improving with good urine output after 2 ultrafiltration treatments on now on lasix gtt      - Hyponatremia- hypervolemic      -CHF with reduced EF     -Chronic dependent lymphedema in the setting of paraplegia     -Neurogenic bladder, history of self-catheterization every 4 hours              Now with langley     -Hematuria:  Doubt trauma. ?  UTI        Plan/   -torsemide 80 mg daily   -kcl 40 meq BID   -mag oxide 1200 mg BID  -Serial renal panel  -daily wts and strict i/o  -renal dose medications   -avoid nephrotoxins    Ok to dc from renal std pt; labs on Monday ; keep follow up appt with me in 10/2021    Claudia Shaver MD  Office: [phone]  Fax:    [phone]  [facility] [place]. [facility]

## 2021-10-01 NOTE — CONSULTS
[facility] Wound Ostomy Continence Nurse  Follow-up Progress Note       NAME:  Jose [address] [employer] [address] RECORD NUMBER:  [id number]  AGE:  50 y.o. GENDER:  male  :  1965  TODAY'S DATE:  10/1/2021    Subjective: Pt states he is being discharged home today and will follow up with Dr Jose L Amaya on Friday. His [facility] RN will see him also. Wound Identification:  Wound Type: non-healing/non-surgical  Contributing Factors: a result of an immbolizer causing friction        Patient Goal of Care:  [x] Wound Healing  [] Odor Control  [] Palliative Care  [] Pain Control   [] Other:     Objective:    /72   Pulse 90   Temp 97.7 °F (36.5 °C) (Oral)   Resp 16   Ht 6' 2\" (1.88 m)   Wt 246 lb (111.6 kg)   SpO2 93%   BMI 31.58 kg/m²   Ismael Risk Score: Ismael Scale Score: 12  Assessment:   Measurements:  Wound 21 #67, Left Chest Wall Cluster (inframmatory),Abcess, Full Thickness, Onsret 21 (Active)   Wound Etiology Other 21 0830   Dressing Status Clean;Dry; Intact 10/01/21 0752   Wound Cleansed Not Cleansed; Other (Comment) 21 0830   Dressing/Treatment Other (comment) 21 0830   Dressing Change Due 21 2152   Wound Length (cm) 0.1 cm 21 1044   Wound Width (cm) 0.5 cm 21 1044   Wound Depth (cm) 0.2 cm 21 1044   Wound Surface Area (cm^2) 0.05 cm^2 21 1044   Change in Wound Size % (l*w) -25 21 1044   Wound Volume (cm^3) 0.01 cm^3 21 1044   Wound Healing % 0 21 1044   Wound Assessment Pink/red 21 0830   Drainage Amount None 21 0830   Odor None 21 0830   Chetna-wound Assessment Intact 21 0830   Number of days: 175       Wound 21 #72, Right Medial Knee Cluster, Pressure Injury, Stage 2, Onset 21 (Active)   Wound Etiology Pressure Stage  2 21 0830   Dressing Status Clean;Dry; Intact 10/01/21 0752   Wound Cleansed Vashe 21 0830   Dressing/Treatment Other (comment) 21 1953   Wound Length (cm) 5 cm 08/27/21 1044   Wound Width (cm) 11 cm 08/27/21 1044   Wound Depth (cm) 0.1 cm 08/27/21 1044   Wound Surface Area (cm^2) 55 cm^2 08/27/21 1044   Change in Wound Size % (l*w) -04010.19 08/27/21 1044   Wound Volume (cm^3) 5.5 cm^3 08/27/21 1044   Wound Healing % -62287 08/27/21 1044   Wound Assessment Other (Comment) 09/29/21 0830   Number of days: 98       Wound 07/30/21 #76, Left Knee, Trauma, Full Thickness, Onset 7/26/21 (Active)   Wound Etiology Traumatic 09/29/21 0830   Dressing Status Clean;Dry; Intact 10/01/21 0752   Wound Cleansed Other (Comment); Not Cleansed 09/29/21 0830   Dressing/Treatment Foam 09/30/21 2030   Dressing Change Due 09/24/21 09/25/21 2152   Wound Length (cm) 0.5 cm 08/27/21 1044   Wound Width (cm) 0.2 cm 08/27/21 1044   Wound Depth (cm) 0.1 cm 08/27/21 1044   Wound Surface Area (cm^2) 0.1 cm^2 08/27/21 1044   Change in Wound Size % (l*w) 76.19 08/27/21 1044   Wound Volume (cm^3) 0.01 cm^3 08/27/21 1044   Wound Healing % 76 08/27/21 1044   Wound Assessment Other (Comment) 09/29/21 0830   Drainage Amount Scant 09/29/21 0830   Drainage Description Serous 09/29/21 0830   Odor None 09/29/21 0830   Number of days: 63       Wound 09/05/21 Sacrum (Active)   Wound Image   09/17/21 0421   Dressing Status Clean;Dry; Intact 10/01/21 0752   Wound Cleansed Soap and water 09/29/21 0830   Dressing/Treatment Foam;Calmoseptine/zinc oxide with menthol 09/30/21 2030   Dressing Change Due 09/30/21 09/30/21 1015   Wound Assessment Pink/red 09/29/21 0830   Drainage Amount None 09/29/21 0830   Odor None 09/29/21 0830   Chetna-wound Assessment Edematous; Excoriated; Other (Comment) 09/29/21 0830   Number of days: 25       Wound 09/17/21 Back Left;Medial non healing surgical area with four screws exposed (Active)   Wound Image   09/17/21 0421   Dressing Status Clean;Dry; Intact 10/01/21 0752   Wound Cleansed Cleansed with saline 09/29/21 0830   Dressing/Treatment Foam 09/30/21 2030   Dressing Change Due 09/30/21 09/30/21 1015   Number of days: 14   Right knee:  Dusky skin to proximal knee, red base, scattered dry stable scabs. Partial thickness skin loss noted. Slightly improved from last week. Response to treatment:  Well tolerated by patient. Pain Assessment:  Severity:  0 / 10  Quality of pain: N/A  Wound Pain Timing/Severity: none  Premedicated: N/A  Plan: Continue current treatment. Plan of Care: Wound 09/05/21 Sacrum-Dressing/Treatment: Foam, Calmoseptine/zinc oxide with menthol  Wound 07/30/21 #76, Left Knee, Trauma, Full Thickness, Onset 7/26/21-Dressing/Treatment: Foam  Wound 09/17/21 Back Left;Medial non healing surgical area with four screws exposed-Dressing/Treatment: Foam  Wound 06/25/21 #72, Right Medial Knee Cluster, Pressure Injury, Stage 2, Onset 6/22/21-Dressing/Treatment:  (polysporin, mepitel, cast padding x2, ace, stockinette)  Wound 04/09/21 #67, Left Chest Wall Cluster (inframmatory),Abcess, Full Thickness, Onsret 4/7/21-Dressing/Treatment: Other (comment) (kiran to alin, opticellAG, mepilex border )    Specialty Bed Required : Yes   [x] Low Air Loss   [] Pressure Redistribution  [] Fluid Immersion  [x] Bariatric  [] Total Pressure Relief  [] Other:     Current Diet: ADULT DIET; Regular; 4 carb choices (60 gm/meal); 1500 ml  Adult Oral Nutrition Supplement;  Low Calorie/High Protein Oral Supplement  Dietician consult:  Yes    Discharge Plan:  Placement for patient upon discharge: home with support   Patient appropriate for Outpatient [address]: Yes    Referrals:  []   [x] [address] [place]  [x] Supplies  [] Other    Patient/Caregiver Teaching:  Level of patient/caregiver understanding able to:   [] Indicates understanding       [] Needs reinforcement  [] Unsuccessful      [x] Verbal Understanding  [] Demonstrated understanding       [] No evidence of learning  [] Refused teaching         [] N/A       Electronically signed by Sarah Mohr RN, CWOCN on 10/1/2021 at 1:32 PM

## 2021-10-04 NOTE — CARE COORDINATION
[facility] Transitions Initial Follow Up Call    Call within 2 business days of discharge: Yes    Patient: Sherry Gamble Patient : 1965   MRN: [id number]  Reason for Admission: SUDHA on CKD3, [address], anasarca (diuresed 41.5L), hypoxia, hypokalemia, hypophosphatemia, hypomagnesemia, gross hematuria (home with Delgado), chest pain, acute on chronic systolic CHF, Ischemic CM, CAD s/p PCI, s/p brief bradycardic cardiac arrest while getting HD catheter , prior hx PE and Paroxysmal A Fib, leukocytosis, elevated PCT, DM2, hypoglycemia, hyponatremia acute on chronic, paraplegia, recent cholecystitis/choledolithiasis  Discharge Date: 10/1/21 RARS: Readmission Risk Score: 64      Last Discharge [facility]       Complaint Diagnosis Description Type Department Provider    21 Other SUDHA (acute kidney injury) ([facility] [address] . .. ED to Hosp-Admission (Discharged) (ADMITTED) [address] [facility] Shekhar Smith MD; Jammie Che. .. Was this an external facility discharge? No Discharge Facility: NA    COVID-19 Not Detected on 2021.     1st attempt - unable to reach or leave message. Per [facility] [place] he is scheduled for [facility] today.      Follow Up  Future Appointments   Date Time Provider Department Center   10/8/2021 10:15 AM Mirna Mohs, MD [facility]   10/15/2021 10:15 AM Mirna Mohs, MD [facility]   10/22/2021 10:15 AM Mirna Mohs, MD [facility]   10/29/2021 10:15 AM Mirna Mohs, MD [facility]   10/29/2021  3:20 PM Baldemar Lua MD [facility] None   2021 10:15 AM Mirna Mohs, MD [facility]   2021 10:15 AM Mirna Mohs, MD [facility]   2021  2:45 PM Ryan Tabares MD [facility] CAR [facility]   2021 10:15 AM Mirna Mohs, MD [facility]   12/3/2021 10:15 AM Mirna Mohs, MD [facility]   12/10/2021 10:15 AM Mirna Mohs, MD Ever[facility] Cor[facility]   2021 10:15 AM Teresa Singer MD [facility]   12/31/2021 10:15 AM MD [facility]       Eleazar De La Torre RN

## 2021-10-05 NOTE — CARE COORDINATION
[facility] Transitions Initial Follow Up Call    Call within 2 business days of discharge: Yes    Patient: Mike Beck Patient : 1965   MRN: [id number]  Reason for Admission: SUDHA on CKD3, [address], anasarca (diuresed 41.5L), hypoxia, hypokalemia, hypophosphatemia, hypomagnesemia, gross hematuria (home with Delgado), chest pain, acute on chronic systolic CHF, Ischemic CM, CAD s/p PCI, s/p brief bradycardic cardiac arrest while getting HD catheter , prior hx PE and Paroxysmal A Fib, leukocytosis, elevated PCT, DM2, hypoglycemia, hyponatremia acute on chronic, paraplegia, recent cholecystitis/choledolithiasis  Discharge Date: 10/1/21 RARS: Readmission Risk Score: 64      Last Discharge [facility]       Complaint Diagnosis Description Type Department Provider    21 Other SUDHA (acute kidney injury) ([facility] [address] . .. ED to Hosp-Admission (Discharged) (ADMITTED) [facility] PCU Brock Kent MD; Annabelle Fabry. .. Spoke with: Mike Beck (patient)    Facility: [facility]    Non-face-to-face services provided:  Obtained and reviewed discharge summary and/or continuity of care documents  Education of patient/family/caregiver/guardian to support self-management-   Assessment and support for treatment adherence and medication management-1111F completed    Was this an external facility discharge? No Discharge Facility: NA    Challenges to be reviewed by the provider   Additional needs identified to be addressed with provider No       Method of communication with provider : none    Advance Care Planning:   Does patient have an Advance Directive:  decision maker updated. Was this a readmission? Yes  Patient stated reason for admission: edema  Patients top risk factors for readmission: functional physical ability, medical condition-(see above), polypharmacy and utilization of services    Care Transition Nurse (CTN) contacted the patient by telephone to perform post hospital discharge assessment. Home Care or Post Acute Services: Yes  Post Acute Services: Home Health, Outpatient/Community Services, Transportation Services, Other (Comment: ambulance transportation, [facility], wound care center (Dr Kelli Landin), Waiver program A (when staff allows))  Patient DME: Wheelchair, Hospital bed, Shower chair, Other  Other Patient DME: prateek lift  Patient Home Equipment: CPAP  Do you have support at home?: Partner/Spouse/SO, Child  Do you feel like you have everything you need to keep you well at home?: Yes  Are you an active caregiver in your home?: Yes  Care Transitions Interventions  No Identified Needs         Follow Up  Future Appointments   Date Time Provider Alyson Cardona   10/8/2021 10:15 Robert Srinivasan MD [facility]   10/15/2021 10:15 AM MD Cl[facility]   10/22/2021 10:15 AM Cathy Lorenz MD [facility]   10/29/2021 10:15 AM Cathy Lorenz MD [facility]   10/29/2021  3:20 PM Amey Lesch, MD [facility] None   11/5/2021 10:15 AM Cathy Lorenz MD [facility]   11/12/2021 10:15 AM MD [facility]   11/18/2021  2:45 PM Khloe Staples MD [facility]   11/19/2021 10:15 AM MD [facility]   12/3/2021 10:15 AM MD [facility]   12/10/2021 10:15 AM MD [facility]   12/17/2021 10:15 AM MD [facility]   12/31/2021 10:15 [facility]       Jovani Julien RN
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
## 2021-10-13 NOTE — CARE COORDINATION
[facility] [facility] Transitions Follow Up Call    10/13/2021    Patient: Sherry Gamble  Patient : 1965   MRN: [id number]  Reason for Admission: SUDHA on CKD3, [address], anasarca (diuresed 41.5L), hypoxia, hypokalemia, hypophosphatemia, hypomagnesemia, gross hematuria (home with Delgado), chest pain, acute on chronic systolic CHF, Ischemic CM, CAD s/p PCI, s/p brief bradycardic cardiac arrest while getting HD catheter , prior hx PE and Paroxysmal A Fib, leukocytosis, elevated PCT, DM2, hypoglycemia, hyponatremia acute on chronic, paraplegia, recent cholecystitis/choledolithiasis  Discharge Date: 10/12/21 RARS: Readmission Risk Score: 56       Unable to reach at this time \"wireless customer you are calling is not available\".      Follow Up  Future Appointments   Date Time Provider Alyson Cardona   10/15/2021 10:15 AM Mirna Mohs, MD [facility]   10/22/2021 10:15 AM Mirna Mohs, MD [facility]   10/29/2021 10:15 AM Mirna Mohs, MD [facility]   10/29/2021  3:20 PM Baldemar Lua MD [facility]   2021 10:15 AM Mirna Mohs, MD [facility]   2021 10:15 AM Mirna Mohs, MD [facility]   2021  2:45 PM Ryan Tabares MD [facility]   2021 10:15 AM Mirna Mohs, MD [facility]   12/3/2021 10:15 AM Mirna Mohs, MD [facility]   12/10/2021 10:15 AM Mirna Mohs, MD [facility]   2021 10:15 AM Mirna Mohs, MD [facility]       Shila Marquez RN

## 2021-10-15 NOTE — PROGRESS NOTES
[facility] Physician Orders and Discharge [facility]  [address] [facility] [address], [address] [facility], [facility]  Telephone: [phone]      Fax: [phone]        Your home care [address] [facility] wound-care supplies will be provided by: [facility] care     NAME: Shiraz Mendez Jr   DATE of BIRTH:  1965  PRIMARY DIAGNOSIS FOR WOUND CARE CENTER:  Pressure Ulcer Stage 2     Wound cleansing:   Do not scrub or use excessive force. Wash hands with soap and water before and after dressing changes. Prior to applying a clean dressing, cleanse wound with normal saline, wound cleanser, or mild soap and water.  Ask your physician or nurse before getting the wound(s) wet in the shower.                Wound care for home:     Left Great Toe Wounds:  Betadine and tubular gauze, tape to foot and leave in place for the week      Left Knee Wound:  Apply Polysporin, Benzoin and mepilex border   Change in 1 week (apply antibiotic ointment and bandaid at home)     Right Knee:  Betadine everything  Versatil (need a few pieces to cover)  cast padding x 2  Ace Very lightly applied then cotton stockinette  Leave on for 1 week     Left Chest wall Wound:  Calmoseptine to alin wound  Small strip of Collagen with Silver gently tucked in to the wound  Cover with an absorptive dressing  Change 3 x week      Chronic Open Back Wound:   Spray Hypochlorous Acid into wound let sit, do not rinse  Calmoseptine to alin wound  Silver Alginate for drainage as needed (we are applying today)  Cover with a Mepilex Border   Change 3 x week     Scrotum/Buttocks/Posterior Thigh:   Calmoseptine to protect skin        Please note, all wounds (unless stated otherwise here) were mechanically debrided at the time of cleansing here in the wound-care center today, so a small amount of pain, drainage or bleeding from that process might be expected, and is normal.      All products for home use, including multiple products for a single wound if applicable, are medically necessary in order to achieve the best chance at timely wound healing. See provider documentation for details if needed.     Substituted dressings applied in the [address] [place] [address] [address] today, if applicable:           New orders for this week (labs, imaging, medications, etc.):           Additional instructions for specific diagnoses:     Continue to use the HIBICLENS (or the generic version) after your bath on the areas that are more troublesome such as your knee, chest and even around the back wound, make sure you let it dry, do not rinse        F/U Appointment is with Dr. Otis Fuller in 1 week   on                                   at                       .     Your nurse  is Heidi FLOWERS.      If we applied slip-resistant hospital socks today, be sure to remove them at least once a day to inspect your toes or feet, even if you're not changing the wraps or dressings underneath.  If you see anything concerning (redness, excess moisture, etc), please call and let us know right away.     Should you experience any significant changes in your wound(s) (including redness, increased warmth, increased pain, increased drainage, odor, or fever) or have questions about your wound care, please contact the [address] [place] [address] at [phone] Monday-Thursday from 8:00 am - 4:30 pm, or Friday from 8:00 am - 2:30 pm.  If you need help with your wound outside these hours and cannot wait until we are again available, contact your home-care company (if applicable), your PCP, or go to the nearest emergency room

## 2021-10-18 NOTE — PLAN OF CARE
New toe wounds noted to Left great toe today and pateitn will have dressign applied today to stay in place for the week. No changes overall in wound care regime but iwll have collagen to remianing chest wound. Home care reimains in place for intermittent dressing changes in the home. Patient is to follow up in wound clinic in 1 week. Discharge instructions reviewed with patient, all questions answered, copy given to patient. Dressings were applied to all wounds per M.D. Instructions at this visit.

## 2021-10-19 PROBLEM — R11.0 NAUSEA: Status: RESOLVED | Noted: 2021-01-01 | Resolved: 2021-01-01

## 2021-10-19 PROBLEM — L97.821 ULCER OF LEFT KNEE, LIMITED TO BREAKDOWN OF SKIN (HCC): Status: ACTIVE | Noted: 2021-01-01
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
## 2021-10-21 NOTE — CARE COORDINATION
[facility] Transitions Follow Up Call    10/21/2021    Patient: Trish Rubinstein  Patient : 1965   MRN: [id number]  Reason for Admission: SUDHA on CKD3, [address], anasarca (diuresed 41.5L), hypoxia, hypokalemia, hypophosphatemia, hypomagnesemia, gross hematuria (home with Delgado), chest pain, acute on chronic systolic CHF, Ischemic CM, CAD s/p PCI, s/p brief bradycardic cardiac arrest while getting HD catheter , prior hx PE and Paroxysmal A Fib, leukocytosis, elevated PCT, DM2, hypoglycemia, hyponatremia acute on chronic, paraplegia, recent cholecystitis/choledolithiasis  Discharge Date: 10/12/21 RARS: Readmission Risk Score: 64         Spoke with: Trish Rubinstein (patient)    Reports things are going great. Home care active. Sees Dr Sunil Vieira tomorrow. Denies needs/concerns. Encouraged him to contact CTN, provider and/or home care for any concerns/changes. He voices understanding. Care Transitions Subsequent and Final Call    Schedule Follow Up Appointment with PCP: Completed  Subsequent and Final Calls  Do you have any ongoing symptoms?: No  Have your medications changed?: No  Do you have any questions related to your medications?: No  Do you currently have any active services?: Yes  Are you currently active with any services?: Home Health, Outpatient/Community Services, Transportation Services, Other  Do you have any needs or concerns that I can assist you with?: No  Identified Barriers: Impairment  Care Transitions Interventions  No Identified Needs  Other Interventions:            Follow Up  Future Appointments   Date Time Provider Alyson Cardona   10/22/2021 10:15 AM MD Peter Tapia [facility]   10/29/2021 10:15 AM MD Peter Tapia [facility]   10/29/2021  3:20 PM MD [facility] None   2021 10:15 AM MD Peter Tapia [facility]   2021 10:15 AM MD Edgar Tapia [facility]   2021  2:45 PM Sarita Azevedo Yaneth Sumner MD [facility]   11/19/2021 10:15 AM MD Praaksh Ni [facility]   12/3/2021 10:15 AM MD Prakash Ni [facility]   12/10/2021 10:15 AM MD Prakash Ni [facility]   12/17/2021 10:15 AM Ac Joya MD [facility]       Faustino Kidd RN

## 2021-10-22 NOTE — PLAN OF CARE
Some improvement noted in overall wound size and appearance. Debrisoft per MD to bilateral knee wounds. Follow up in wound clinic as ordered. Home care remains in place for intermittent wound care in the home. Discharge instructions reviewed with patient, all questions answered, copy given to patient. Dressings were applied to all wounds per M.D. Instructions at this visit.

## 2021-10-22 NOTE — PROGRESS NOTES
[address] [facility] Physician Orders and Discharge [address], [address] [facility], [facility]  Telephone: [phone]      Fax: [phone]        Your home care [facility] wound-care supplies will be provided by: [facility] care     NAME: Tracey Dhaliwal Jr   DATE of BIRTH:  1965  PRIMARY DIAGNOSIS FOR WOUND CARE CENTER:  Pressure Ulcer Stage 2     Wound cleansing:   Do not scrub or use excessive force. Wash hands with soap and water before and after dressing changes. Prior to applying a clean dressing, cleanse wound with normal saline, wound cleanser, or mild soap and water. Ask your physician or nurse before getting the wound(s) wet in the shower.                Wound care for home:     Left Great Toe Wounds:  Betadine and tubular gauze, tape to foot and leave in place for the week        Left Knee Wound:  Apply Polysporin, Benzoin and mepilex border   Change in 1 week (apply antibiotic ointment and bandaid at home)     Right Knee:  Betadine everything  Versatil (need a few pieces to cover)  cast padding x 2  Ace Very lightly applied then cotton stockinette  Leave on for 1 week     Chronic Open Back Wound:   Spray Hypochlorous Acid into wound let sit, do not rinse  Calmoseptine to alin wound  Silver Alginate for drainage as needed (we are applying today)  Cover with a Mepilex Border   Change 3 x week     Scrotum/Buttocks/Posterior Thigh:   Calmoseptine to protect skin        Please note, all wounds (unless stated otherwise here) were mechanically debrided at the time of cleansing here in the wound-care center today, so a small amount of pain, drainage or bleeding from that process might be expected, and is normal.      All products for home use, including multiple products for a single wound if applicable, are medically necessary in order to achieve the best chance at timely wound healing.  See provider documentation for details if needed.     Substituted dressings applied in the [address] [place] [address],[address] today, if applicable:           New orders for this week (labs, imaging, medications, etc.):           Additional instructions for specific diagnoses:     Continue to use the HIBICLENS (or the generic version) after your bath on the areas that are more troublesome such as your knee, chest and even around the back wound, make sure you let it dry, do not rinse        F/U Appointment is with Dr. Shaq Laguna in 1 week   on                                   at                       .     Your nurse  is Heidi FLOWERS.      If we applied slip-resistant hospital socks today, be sure to remove them at least once a day to inspect your toes or feet, even if you're not changing the wraps or dressings underneath.  If you see anything concerning (redness, excess moisture, etc), please call and let us know right away.     Should you experience any significant changes in your wound(s) (including redness, increased warmth, increased pain, increased drainage, odor, or fever) or have questions about your wound care, please contact the [facility] at [phone] Monday-Thursday from 8:00 am - 4:30 pm, or Friday from 8:00 am - 2:30 pm.  If you need help with your wound outside these hours and cannot wait until we are again available, contact your home-care company (if applicable), your PCP, or go to the nearest emergency room

## 2021-10-27 NOTE — CARE COORDINATION
[facility] Transitions Follow Up Call    10/27/2021    Patient: Clyde Goins  Patient : 1965   MRN: [id number]  Reason for Admission: SUDHA on CKD3, [address], anasarca (diuresed 41.5L), hypoxia, hypokalemia, hypophosphatemia, hypomagnesemia, gross hematuria (home with Delgado), chest pain, acute on chronic systolic CHF, Ischemic CM, CAD s/p PCI, s/p brief bradycardic cardiac arrest while getting HD catheter , prior hx PE and Paroxysmal A Fib, leukocytosis, elevated PCT, DM2, hypoglycemia, hyponatremia acute on chronic, paraplegia, recent cholecystitis/choledolithiasis  Discharge Date: 10/12/21 RARS: Readmission Risk Score: 64         Spoke with: Clyde Goins (patient)    Still active with [facility]. They were there today. Follows with Dr Cristy Helms weekly on . Sees PCP virtually tomorrow because they didn't have opening near his scheduled time at [address] [place] [address],[address]. Feels well and without concerns today. Denies needs. CTN outreach completed. Will notify ACM. Care Transitions Subsequent and Final Call    Schedule Follow Up Appointment with PCP: Completed  Subsequent and Final Calls  Do you have any ongoing symptoms?: No  Have your medications changed?: No  Do you have any questions related to your medications?: No  Do you currently have any active services?: Yes  Are you currently active with any services?: Home Health, Outpatient/Community Services, Transportation Services, Other  Do you have any needs or concerns that I can assist you with?: No  Identified Barriers: Impairment  Care Transitions Interventions  Other Interventions:            Follow Up  Future Appointments   Date Time Provider Alyson Cardona   10/29/2021 10:15 AM MD Jun Salvador [facility]   10/29/2021  3:20 PM Pavan Cleveland MD [place] [facility] None   2021 10:15 AM MD Jun [facility]   2021 10:15 AM MD Jun [facility]   2021  2:45 PM Abdifatah Anderson MD [facility] REBEKAH PINZON [facility]   11/19/2021 10:15 AM MD Isidra Ahmadi [facility]   12/3/2021 10:15 AM MD Isidra Ahmadi [facility]   12/10/2021 10:15 AM MD Isidra Ahmadi [facility]   12/17/2021 10:15 AM MD Isidra Ahmadi [facility]       Marsha Doss RN

## 2021-10-29 PROBLEM — I48.0 PAROXYSMAL ATRIAL FIBRILLATION (HCC): Status: ACTIVE | Noted: 2021-01-01

## 2021-10-29 PROBLEM — Z89.511 S/P BKA (BELOW KNEE AMPUTATION) UNILATERAL, RIGHT (HCC): Status: ACTIVE | Noted: 2021-01-01

## 2021-10-29 NOTE — PLAN OF CARE
Debrisoft to bilateral knees per MD.  Ne area of debrided surgical site form previous lap surgery. Wound care regime as noted on AVS, home care remains in place for intermittent wound care at home. Discharge instructions reviewed with patient, all questions answered, copy given to patient. Dressings were applied to all wounds per M.D. Instructions at this visit.

## 2021-10-29 NOTE — PROGRESS NOTES
[facility] Physician Orders and Discharge 800 [facility]  [address], [address], [facility]  Telephone: [phone]      Fax: [phone]        Your home care [facility] wound-care supplies will be provided by: [facility] care     NAME: Kary Zhu Jr   DATE of BIRTH:  1965  PRIMARY DIAGNOSIS FOR WOUND CARE CENTER:  Pressure Ulcer Stage 2     Wound cleansing:   Do not scrub or use excessive force. Wash hands with soap and water before and after dressing changes. Prior to applying a clean dressing, cleanse wound with normal saline, wound cleanser, or mild soap and water. Ask your physician or nurse before getting the wound(s) wet in the shower.                Wound care for home:     Abdominal Wound:  Polysporin and dry dressing if drainage is minimal  Silver Alginate and dry dressing if drainage is increased  Change 3 x week    Left Great Toe Wound: Take the coban off today in wound clinic leave Surgicel in place then apply Betadine and tubular gauze, tape to foot and leave in place for the week      Left Knee Wound:  Apply Polysporin, Benzoin and mepilex border   Change in 1 week (apply antibiotic ointment and bandaid at home)     Right Knee:  Betadine everything  Versatil (need a few pieces to cover) then plain foam  cast padding x 2  Ace Very lightly applied then cotton stockinette  Leave on for 1 week     Chronic Open Back Wound:   Dr. Jose Lopez used Silver Nitrate on your wound today.   The wound will be black or silver in appearance  for the next several days, this is normal.   Triamcinolone to nitrated area  Spray Hypochlorous Acid into wound let sit, do not rinse  Calmoseptine to alin wound  Silver Alginate for drainage as needed make sure to use 1 big piece over back wounds not individual pieces (we are applying today) and if home care uses drawtex make sure it is 1 piece as well  Cover with a Mepilex Border making sure to cover the skin tear   Change 3 x week     Scrotum/Buttocks/Posterior Thigh:   Calmoseptine to protect skin        Please note, all wounds (unless stated otherwise here) were mechanically debrided at the time of cleansing here in the wound-care center today, so a small amount of pain, drainage or bleeding from that process might be expected, and is normal.      All products for home use, including multiple products for a single wound if applicable, are medically necessary in order to achieve the best chance at timely wound healing. See provider documentation for details if needed.     Substituted dressings applied in the [facility] today, if applicable:           New orders for this week (labs, imaging, medications, etc.):           Additional instructions for specific diagnoses:     Continue to use the HIBICLENS (or the generic version) after your bath on the areas that are more troublesome such as your knee, chest and even around the back wound, make sure you let it dry, do not rinse        F/U Appointment is with Dr. Monica Rios in 1 week   on                                   at                       .     Your nurse  is Heidi FLOWERS.      If we applied slip-resistant hospital socks today, be sure to remove them at least once a day to inspect your toes or feet, even if you're not changing the wraps or dressings underneath.  If you see anything concerning (redness, excess moisture, etc), please call and let us know right away.     Should you experience any significant changes in your wound(s) (including redness, increased warmth, increased pain, increased drainage, odor, or fever) or have questions about your wound care, please contact the [employer] at [phone] Monday-Thursday from 8:00 am - 4:30 pm, or Friday from 8:00 am - 2:30 pm.  If you need help with your wound outside these hours and cannot wait until we are again available, contact your home-care company (if applicable), your PCP,
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
## 2021-10-31 PROBLEM — L98.492 ULCER OF CHEST WALL WITH FAT LAYER EXPOSED (HCC): Status: RESOLVED | Noted: 2021-02-22 | Resolved: 2021-01-01
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
## 2021-11-02 NOTE — PATIENT INSTRUCTIONS
Patient Education        Learning About Low Blood Sugar (Hypoglycemia) in Diabetes  What is low blood sugar (hypoglycemia)? Hypoglycemia means that your blood sugar is low and your body (especially your brain) is not getting enough fuel. If you have diabetes, your blood sugar can go too low if you take too much of some diabetes medicines. It can also go too low if you miss a meal. And it can happen if you exercise too hard without eating enough food. Some medicines used to treat other health problems can cause low blood sugar too. What are the symptoms? Symptoms of low blood sugar can start quickly. It may take just 10 to 15 minutes. If you have had diabetes for many years, you may not realize that your blood sugar is low until it drops very low. · If your blood sugar level drops below 70 (mild low blood sugar), you may feel tired, anxious, dizzy, weak, shaky, or sweaty. You may have a fast heartbeat or blurry vision. · If your blood sugar level continues to drop, your behavior may change. You may feel more irritable. You may find it hard to concentrate or talk. And you may feel unsteady when you stand or walk. You may become too weak or confused to eat something with sugar to raise your blood sugar level. · If your blood sugar level drops very low, you may pass out (lose consciousness). Or you may have a seizure or stroke. If you have symptoms of severe low blood sugar, you need to get medical care right away. If you had a low blood sugar level during the night, you may wake up tired or with a headache. Or you may sweat so much during the night that your pajamas or sheets are damp when you wake up. How is low blood sugar treated? You can treat low blood sugar by eating or drinking something that has 15 grams of carbohydrate. These should be quick-sugar foods.  Check your blood sugar level again 15 minutes after having a quick-sugar food to make sure your level is getting back to your target range.  Children usually need less than 15 grams of carbohydrate. Check with your doctor or diabetes educator for the amount that is right for your child. Here are examples of quick-sugar foods that have 15 grams of carbohydrate:  · 3 to 4 glucose tablets  · 1 tablespoon (3 teaspoons) of table sugar  · 1 tablespoon (3 teaspoons) honey  · ½ cup to ¾ cup (4 to 6 ounces) of fruit juice or regular (not diet) soda  · Hard candy (such as 6 Life Savers)  If you have problems with severe low blood sugar, someone else may have to give you glucagon. This is a hormone that raises blood sugar levels quickly. How can you prevent low blood sugar? You can take steps to prevent low blood sugar. · Follow your treatment plan. Take your insulin or other diabetes medicine exactly as your doctor prescribed it. Talk with your doctor if you're having low blood sugar often. Your medicine may need to be adjusted if it's causing your low blood sugar. · Check your blood sugar levels often. This helps you find early changes before an emergency happens. · Keep a quick-sugar food with you in case your blood sugar level drops low. · Eat small meals more often so that you don't get too hungry between meals. Don't skip meals. · Balance extra exercise with eating more. Check your blood sugar and learn how it changes after exercise. If your blood sugar stays at a normal level, you may not need to eat after you exercise. · Limit how much alcohol you drink. Alcohol can make low blood sugar go even lower. Don't drink alcohol if you have problems recognizing the early signs of low blood sugar. · Keep a diary of your symptoms. This helps you learn when changes in your body may signal low blood sugar. And keep track of how often you have low blood sugar, including when you last ate and what you ate. This will help you learn what causes your blood sugar to drop. · Learn about diabetes and low blood sugar.  Support groups or a diabetes education center can help you understand how medicines, diet, and exercise affect your blood sugar levels. Since low blood sugar levels can quickly become an emergency, be sure to wear medical alert jewelry, such as a medical alert bracelet. This is to let people know you have diabetes so they can get help for you. You can buy this at most drugstores. And make sure your family, friends, and coworkers know the symptoms of low blood sugar. Teach them what to do to get your sugar level up. Follow-up care is a key part of your treatment and safety. Be sure to make and go to all appointments, and call your doctor if you are having problems. It's also a good idea to know your test results and keep a list of the medicines you take. Where can you learn more? Go to [email or web address].[email or web address] org and sign in to your [employer] account. Enter Z256 in the InSphero box to learn more about \"Learning About Low Blood Sugar (Hypoglycemia) in Diabetes. \"     If you do not have an account, please click on the \"Sign Up Now\" link. Current as of: August 31, 2020               Content Version: 13.0  © 7646-0040 Healthwise, Incorporated. Care instructions adapted under license by [facility] ([facility]). If you have questions about a medical condition or this instruction, always ask your healthcare professional. [address] any warranty or liability for your use of this information.
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
## 2021-11-05 NOTE — PLAN OF CARE
[address] [facility] Physician Orders and Discharge [address], [address] [facility], [facility]  Telephone: [phone]      Fax: [phone]        Your home care [address] [facility] wound-care supplies will be provided by: [facility] care     NAME: Maryan Hobbs Jr   DATE of BIRTH:  1965  PRIMARY DIAGNOSIS FOR WOUND CARE CENTER:  Pressure Ulcer Stage 2     Wound cleansing:   Do not scrub or use excessive force. Wash hands with soap and water before and after dressing changes. Prior to applying a clean dressing, cleanse wound with normal saline, wound cleanser, or mild soap and water.  Ask your physician or nurse before getting the wound(s) wet in the shower.                Wound care for home:     Abdominal Wound:  Collagen - please give Pt the remainder of collagen today in [address] [place] [address],[address]   Dry dressing   Change 3 x week     Left Great Toe Wound:  Betadine and tubular gauze, tape to foot and leave in place for the week      Left Knee Wound:  Aquaphor       Left Lateral Leg:  Apply Collagen, Benzoin and mepilex border   Leave in place this week     Right Knee:  Versatil (need a few pieces to cover) then plain foam  cast padding x 2  Ace Very lightly applied then cotton stockinette  Leave on for 1 week     Chronic Open Back Wound:   Dr. El Cramer used Silver Nitrate on your wound today.  The wound will be black or silver in appearance  for the next several days, this is normal.   Triamcinolone to nitrated area  Spray Hypochlorous Acid into wound let sit, do not rinse  Calmoseptine to alin wound  Silver Alginate for drainage as needed make sure to use 1 big piece over back wounds not individual pieces (we are applying today) and if home care uses drawtex make sure it is 1 piece as well  Cover with a Mepilex Border making sure to cover the skin tear   Change 3 x week     Scrotum/Buttocks/Posterior Thigh:   Calmoseptine to protect skin     Please note, all wounds (unless stated otherwise here) were mechanically debrided at the time of cleansing here in the wound-care center today, so a small amount of pain, drainage or bleeding from that process might be expected, and is normal.      All products for home use, including multiple products for a single wound if applicable, are medically necessary in order to achieve the best chance at timely wound healing. See provider documentation for details if needed.     Substituted dressings applied in the [address] [place] [address] today, if applicable:           New orders for this week (labs, imaging, medications, etc.):           Additional instructions for specific diagnoses:     Continue to use the HIBICLENS (or the generic version) after your bath on the areas that are more troublesome such as your knee, chest and even around the back wound, make sure you let it dry, do not rinse        F/U Appointment is with Dr. Otis Fuller in 1 week   on                                   at                       .     Your nurse  is Heidi FLOWERS.      If we applied slip-resistant hospital socks today, be sure to remove them at least once a day to inspect your toes or feet, even if you're not changing the wraps or dressings underneath.  If you see anything concerning (redness, excess moisture, etc), please call and let us know right away.     Should you experience any significant changes in your wound(s) (including redness, increased warmth, increased pain, increased drainage, odor, or fever) or have questions about your wound care, please contact the [facility] at [phone] Monday-Thursday from 8:00 am - 4:30 pm, or Friday from 8:00 am - 2:30 pm.  If you need help with your wound outside these hours and cannot wait until we are again available, contact your home-care company (if applicable), your PCP, or go to the nearest emergency room

## 2021-11-05 NOTE — PLAN OF CARE
Wound debridement of abdominal wound today per Dr. Jessica Escobar, Pt tolerated well. Signs of infection noted to back wound, no antibiotics at this time. S/S of infection reviewed with Pt per Dr. Jessica Escobar. POC reviewed. Follow up in [address] [place] [address] in 1 week as ordered. Pt. Aware to call sooner with any problems or questions/concerns.  MD orders/D/C instructions reviewed with patient, all questions answered; copy of instructions given to patient

## 2021-11-12 NOTE — TELEPHONE ENCOUNTER
Within this Telehealth Consent, the terms you and yours refer to the person using the Telehealth Service (Service), or in the case of a use of the Service by or on behalf of a minor, you and yours refer to and include (i) the parent or legal guardian who provides consent to the use of the Service by such minor or uses the Service on behalf of such minor, and (ii) the minor for whom consent is being provided or on whose behalf the Service is being utilized. When using Service, you will be consulting with your health care providers via the use of Telehealth.   Telehealth involves the delivery of healthcare services using electronic communications, information technology or other means between a healthcare provider and a patient who are not in the same physical location. Telehealth may be used for diagnosis, treatment, follow-up and/or patient education, and may include, but is not limited to, one or more of the following:    Electronic transmission of medical records, photo images, personal health information or other data between a patient and a healthcare provider    Interactions between a patient and healthcare provider via audio, video and/or data communications    Use of output data from medical devices, sound and video files    Anticipated Benefits   The use of Telehealth by your Provider(s) through the Service may have the following possible benefits:    Making it easier and more efficient for you to access medical care and treatment for the conditions treated by such Provider(s) utilizing the Service    Allowing you to obtain medical care and treatment by Provider(s) at times that are convenient for you    Enabling you to interact with Provider(s) without the necessity of an in-office appointment     Possible Risks   While the use of Telehealth can provide potential benefits for you, there are also potential risks associated with the use of Telehealth.  These risks include, but may not be limited to the following:    Your Provider(s) may not able to provide medical treatment for your particular condition and you may be required to seek alternative healthcare or emergency care services.  The electronic systems or other security protocols or safeguards used in the Service could fail, causing a breach of privacy of your medical or other information.  Given regulatory requirements in certain jurisdictions, your Provider(s) diagnosis and/or treatment options, especially pertaining to certain prescriptions, may be limited. Acceptance   1. You understand that Services will be provided via Telehealth. This process involves the use of HIPAA compliant and secure, real-time audio-visual interfacing with a qualified and appropriately trained provider located at [facility]. 2. You understand that, under no circumstances, will this session be recorded. 3. You understand that the Provider(s) at [facility] and other clinical participants will be party to the information obtained during the Telehealth session in accordance with best medical practices. 4. You understand that the information obtained during the Telehealth session will be used to help determine the most appropriate treatment options. 5. You understand that You have the right to revoke this consent at any point in time. 6. You understand that Telehealth is voluntary, and that continued treatment is not dependent upon consent. 7. You understand that, in the event of non-consent to Telehealth services and/or technical difficulties, you will obtain services as typically provided in the absence of Telehealth technology. 8. You understand that this consent will be kept in Your medical record. 9. No potential benefits from the use of Telehealth or specific results can be guaranteed. Your condition may not be cured or improved and, in some cases, may get worse.    10. There are limitations in the terms described in the Terms of Service and this Telehealth Consent. The patient was read the following statement and has consented to the visit as of 11/12/21. The patient has been scheduled for their first telehealth visit on 11/12/21 with .
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
## 2021-11-12 NOTE — PLAN OF CARE
Some minor changes in wound care regime to right knee. MD also took a wound culture form back wound today but no changes in meds or wound care at this time. Follow up in wound clinic in 1 week. Discharge instructions reviewed with patient, all questions answered, copy given to patient. Dressings were applied to all wounds per M.D. Instructions at this visit.

## 2021-11-14 PROBLEM — E11.9 DIABETES MELLITUS (HCC): Status: ACTIVE | Noted: 2018-01-07

## 2021-11-17 PROBLEM — L97.821 ULCER OF LEFT KNEE, LIMITED TO BREAKDOWN OF SKIN (HCC): Status: RESOLVED | Noted: 2021-01-01 | Resolved: 2021-01-01
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
## 2021-11-18 NOTE — PATIENT INSTRUCTIONS
Plan:  1. Medications reviewed  2. Recommend rechecking labs and restart Eliquis at 5mg twice a day  3. CBC, TSH, CMP, and lipids  4. Call [phone] to schedule echo and stress test  5.  Follow up in 3 months

## 2021-11-18 NOTE — PROGRESS NOTES
[facility] Progress Note    Sherry Gamble     : 1965    DATE OF VISIT:  2021    Subjective:     Sherry Gamble is a 50 y.o. male who has a dehisced surgical ulcer located on the back (midline). Significant symptoms or pertinent wound history since last visit: feeling ok overall, no fever or chills, eating well, no unexpected hyperglycemia, no SOB, no N/V/D. Additional ulcer(s) noted? yes. Everything from last week still, except the left knee is healed. His current medication list consists of Alirocumab, Fluticasone furoate-vilanterol, Insulin Pen Needle, Insulin Syringe-Needle U-100, One Flow Spirometer, Thera-Gesic, albuterol, albuterol sulfate HFA, apixaban, aspirin, blood glucose test strips, cetirizine, cyclobenzaprine, docusate sodium, ferrous sulfate, insulin glargine, insulin lispro, magnesium oxide, metFORMIN, metoprolol succinate, montelukast, nitroGLYCERIN, nystatin, pantoprazole, polyethylene glycol, potassium chloride, senna, torsemide, and traZODone.     Allergies: Benadryl [diphenhydramine hcl], Keflex [cephalexin], Statins, Cephalexin, Morphine, Penicillins, and Sulfa antibiotics    Objective:     Vitals:    21 1010   BP: 102/69   Pulse: 87   Resp: 18   Temp: 97 °F (36.1 °C)   TempSrc: Oral   Weight: 227 lb (103 kg)   Height: 6' 2\" (1.88 m)     Constitutional:  well-developed, well-nourished, NAD, conversant, looks well   Psychiatric:  oriented to person, place and time; mood and affect appropriate for the situation   Cardiovascular:  heart regular, no gallop, no murmur; moderate lower extremity lymphedema; left foot a bit cool, slow cap refill, Dopplerable pulses  GI:  abdomen soft, less abd wall edema, less distended, normal bowel sounds, no palpable masses or organomegaly  Musculoskeletal:  no clubbing, cyanosis or petechiae; extremities with no gross effusions, joint misalignment or acute arthritis    Periwound skin: pink and indurated at the T-spine, with one small fluctuant area, minor friction changes at ischium, less flaking and fragile hyperkeratosis at the knees, but more ecchymosis on the right side again (I think from the Ace wrap being rather snug), some ecchymotic changes and cool edema at the great toe. Left lower leg looks a bit more purple and hyperkeratotic too. Wounds: T-spine with stable exposure of 4 fixation screws, a bit more hypergranulation tissue again, with a bit more purulent and bloody drainage than his recent baseline; ischium a stage 2 pressure ulcer cluster with some signs of friction, no necrosis, smaller overall, almost healed; left knee cluster healed; right knee cluster with some slowly improving areas of granulation, a bit of loose fibrin and loose epidermal necrosis; Left great toe just about healed; one midline laparoscopic site dehisced, small, circular, clean, but a bit dry; left lower leg did take a step backwards this week, however, with some darker pressure necrosis, and a bit of exposed tendon, no signs of infection - I think that is likely getting pressure when he's in bed, even as careful as he's been. Photos also saved in the EMR.     Today's wound measurements, per RN documentation:  Wound 10/29/21 #79, abdomen, non healing surgical incision, full thickness, onset 10/29/21-Wound Length (cm): 0.7 cm  Wound 10/15/21 #77, Left Great Toe, DTI, Onset 9/21/21-Wound Length (cm): 0.8 cm  Wound 10/15/21 #78, Coccyx, Stage 2 pressure, Partial Thickness, Onset 10/12/21-Wound Length (cm): 0.5 cm  Wound 07/30/21 #76, Left Knee, Trauma, Full Thickness, Onset 7/26/21-Wound Length (cm):  (MD to measure )  Wound 06/25/21 #72, Right Medial Knee Cluster, Pressure Injury, Stage 2, Onset 6/22/21-Wound Length (cm): 6 cm  Wound 11/05/21 #80, Left posterior leg cluster, pressure stage 2, onset 11/02/2021-Wound Length (cm): 3 cm  Wound 11/05/21 # 81, mid-back, Surgical, full thickness, onset June 2015-Wound Length (cm): 8.3 cm    Wound 10/29/21 #79, abdomen, non healing surgical incision, full thickness, onset 10/29/21-Wound Width (cm): 0.8 cm  Wound 10/15/21 #77, Left Great Toe, DTI, Onset 9/21/21-Wound Width (cm): 0.6 cm  Wound 10/15/21 #78, Coccyx, Stage 2 pressure, Partial Thickness, Onset 10/12/21-Wound Width (cm): 0.5 cm  Wound 06/25/21 #72, Right Medial Knee Cluster, Pressure Injury, Stage 2, Onset 6/22/21-Wound Width (cm): 5 cm  Wound 11/05/21 #80, Left posterior leg cluster, pressure stage 2, onset 11/02/2021-Wound Width (cm): 1.2 cm  Wound 11/05/21 # 81, mid-back, Surgical, full thickness, onset June 2015-Wound Width (cm): 5 cm    Wound 10/29/21 #79, abdomen, non healing surgical incision, full thickness, onset 10/29/21-Wound Depth (cm): 0.2 cm  Wound 10/15/21 #77, Left Great Toe, DTI, Onset 9/21/21-Wound Depth (cm): 0.1 cm  Wound 10/15/21 #78, Coccyx, Stage 2 pressure, Partial Thickness, Onset 10/12/21-Wound Depth (cm): 0.1 cm  Wound 06/25/21 #72, Right Medial Knee Cluster, Pressure Injury, Stage 2, Onset 6/22/21-Wound Depth (cm): 0.1 cm  Wound 11/05/21 #80, Left posterior leg cluster, pressure stage 2, onset 11/02/2021-Wound Depth (cm): 0.2 cm  Wound 11/05/21 # 81, mid-back, Surgical, full thickness, onset June 2015-Wound Depth (cm): 0.3 cm    Assessment:     Patient Active Problem List   Diagnosis Code    Mixed hyperlipidemia E78.2    Coronary artery disease involving native coronary artery of native heart without angina pectoris I25.10    Paraplegia, complete (HCC) G82.21    Chronic back pain M54.9, G89.29    Arthritis M19.90    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    LIBORIO on CPAP G47.33, Z99.89    Uncontrolled type 2 diabetes mellitus with hyperglycemia (HCC) E11.65    Type 2 diabetes mellitus with diabetic peripheral angiopathy without gangrene, with long-term current use of insulin ([facility]) E11.51, Z79.4    Moderate persistent asthma without complication L41.96    Choledocholithiasis K80.50    Hyponatremia E87.1    Ulcer of right knee, limited to breakdown of skin ([facility] [address] L97.811    CHF (congestive heart failure), NYHA class I, acute on chronic, combined ([facility] [address] I50.43    Acute on chronic renal insufficiency N28.9, N18.9    S/P BKA (below knee amputation) unilateral, right ([facility]) Z89.511    Paroxysmal atrial fibrillation ([facility]) I48.0       Assessment of today's active condition(s): DM, paraplegia, right BKA, lymphedema, vascular disease, multiple nonhealing wounds; things are a bit up and down this week; left knee healed, right knee I think a bit irritated from lymphedema and the Ace wrap, abdomen a bit dry, back maybe draining more than usual, but he's not showing other signs of infection; left toe is fine, left leg is definitely worse -- will need debridement, but not before we get him better offloaded again. Factors contributing to occurrence and/or persistence of the chronic ulcer include lymphedema, diabetes, chronic pressure, decreased mobility, shear force and decreased tissue oxygenation. Medical necessity of today's visit is shown by the above documentation. Sharp debridement is indicated today, based upon the exam findings in the wound(s) above. Procedure note:     Consent obtained. Time out performed per [facility] policy. Anesthetic  Anesthetic: 4% Lidocaine Cream     To encourage better epithelial cell coverage, I did use AgNO3 to chemically cauterize hypergranulation tissue on the back (midline) and right knee ulcer(s). This was tolerated well, with no significant pain or skin injury.      Discharge plan: Treatment in the wound care center today, per RN documentation: Wound 10/29/21 #79, abdomen, non healing surgical incision, full thickness, onset 10/29/21-Dressing/Treatment: Other (comment) (PSO,mepilex)  Wound 10/15/21 #77, Left Great Toe, DTI, Onset 9/21/21-Dressing/Treatment: Other (comment) (betadine,tubular gauze)  Wound 10/15/21 #78, Coccyx, Stage 2 pressure, Partial Thickness, Onset 10/12/21-Dressing/Treatment: Other (comment) (calmoseptine)  Wound 07/30/21 #76, Left Knee, Trauma, Full Thickness, Onset 7/26/21-Dressing/Treatment: Other (comment) (aquaphor)  Wound 06/25/21 #72, Right Medial Knee Cluster, Pressure Injury, Stage 2, Onset 6/22/21-Dressing/Treatment: Other (comment) (vashe,versatil,cast pad x2,kerlix)  Wound 11/05/21 #80, Left posterior leg cluster, pressure stage 2, onset 11/02/2021-Dressing/Treatment: Other (comment) (vashe,triad,opticell ag,mepilex)  Wound 11/05/21 # 81, mid-back, Surgical, full thickness, onset June 2015-Dressing/Treatment: Other (comment) (triamcinolone,vashe,kiran-alin,opticell ag,mepilex). Leave dressings on the right knee and left toe for the week. Left knee can just get a bit of Aquaphor PRN. Keep up with good work on protein intake and glucose control. I took a new Cx from the back wound today, just in case he shows more significant signs of infection in the coming weeks (celluliits, fevers, hyperglycemia), so we'll have some sense of what is in that tissue now, to guide Abx therapy if needed. For the left foot and distal leg, be sure to wear the HeelMedix boot at all times -- anticipate some debridement there next week. Home treatment: good handwashing before and after any dressing changes. Cleanse wound with saline or soap & water before dressing change. May use Vaseline (petrolatum), Aquaphor, Aveeno, CeraVe, Cetaphil, Eucerin, Lubriderm, etc for dry skin.      Dressing type for home: basically as above for the back, left lower leg, abdomen and ischium, every day or two, as needed. Written discharge instructions given to patient. Follow up in 1 week.     Electronically signed by Alexus Beauchamp MD on 11/18/2021 at 2:14 PM.

## 2021-11-19 NOTE — PROGRESS NOTES
[facility] Physician Orders and Discharge [address], [address] [facility], [facility]  Telephone: [phone]      Fax: [phone]        Your home care [address] [facility] wound-care supplies will be provided by: [facility] [place]     NAME: Tracey Dhaliwal Jr   DATE of BIRTH:  1965  PRIMARY DIAGNOSIS FOR WOUND CARE CENTER:  Pressure Ulcer Stage 2     Wound cleansing:   Do not scrub or use excessive force. Wash hands with soap and water before and after dressing changes. Prior to applying a clean dressing, cleanse wound with normal saline, wound cleanser, or mild soap and water.  Ask your physician or nurse before getting the wound(s) wet in the shower.                Wound care for home:     Abdominal Wound:  Antibiotic ointment   Dry dressing   Change 3 x week     Left Great Toe Wound:  Betadine and tubular gauze, tape to foot and leave in place for the week then at home lenka;ly antibiitc ointment and a bandaid the 2nd week     Left Knee Wound:  Aquaphor at home as needed      Left Lateral/Posterior Leg:  Vashe,Triad, Opticel Ag, Mepilex border   Leave in place for a week then change     Make sure you are wearing your offloading boots when in bed      Right Knee:  Vashe  Versatil (need a few pieces to cover) then plain foam  cast padding x 2  Kerlix  Leave on for 1 week we are sending supplies to change next week at home     Chronic Open Back Wound:   Spray Hypochlorous Acid into wound let sit, do not rinse  Calmoseptine to alin wound  Silver Alginate for drainage as needed make sure to use 1 big piece over back wounds not individual pieces (we are applying today) and if home care uses drawtex make sure it is 1 piece as well  Cover with a Mepilex Border making sure to cover the skin tear   Change 3 x week     Scrotum/Buttocks/Posterior Thigh:   Calmoseptine to protect skin     Please note, all wounds (unless stated otherwise here) were mechanically debrided at the time of cleansing here in the wound-care center today, so a small amount of pain, drainage or bleeding from that process might be expected, and is normal.      All products for home use, including multiple products for a single wound if applicable, are medically necessary in order to achieve the best chance at timely wound healing. See provider documentation for details if needed.     Substituted dressings applied in the [facility] today, if applicable:           New orders for this week (labs, imaging, medications, etc.):           Additional instructions for specific diagnoses:     Continue to use the HIBICLENS (or the generic version) after your bath on the areas that are more troublesome such as your knee, chest and even around the back wound, make sure you let it dry, do not rinse        F/U Appointment is with Dr. Erasmo Hankins in 2 weeks   on                                   at                       .     Your nurse  is Heidi FLOWERS.      If we applied slip-resistant hospital socks today, be sure to remove them at least once a day to inspect your toes or feet, even if you're not changing the wraps or dressings underneath.  If you see anything concerning (redness, excess moisture, etc), please call and let us know right away.     Should you experience any significant changes in your wound(s) (including redness, increased warmth, increased pain, increased drainage, odor, or fever) or have questions about your wound care, please contact the [facility] at [phone] Monday-Thursday from 8:00 am - 4:30 pm, or Friday from 8:00 am - 2:30 pm.  If you need help with your wound outside these hours and cannot wait until we are again available, contact your home-care company (if applicable), your PCP, or go to the nearest emergency room

## 2021-11-22 NOTE — PLAN OF CARE
Improvement noted in wounds this visit. No debridement indicated per MD.  Some minor changes noted in primary wound dressings. Patient is to follow up in wound clinic in 2 weeks. Patient continues to have home care services for intermittent wound care in the home. Discharge instructions reviewed with patient, all questions answered, copy given to patient. Dressings were applied to all wounds per M.D. Instructions at this visit.

## 2021-11-22 NOTE — CARE COORDINATION
Ambulatory Care Coordination Note  11/22/2021   Risk Score: 15  Charlson 10 Year Mortality Risk Score: 100%     ACC: Tay Jean-Baptiste RN    Summary Note: Follow up call completed with patient. He is doing well. He had completed all his post discharge visits with providers. Continues to also be active with wound care. Home health twice a week and his mom is staying with him currently to help. He continues to have not home HHA from [facility]. Last spoke to his CM last Thursday. He worries about how to manage if his mom goes home     Reports his blood sugars have been controlled. Rarely still needing meal time Humalog  Lab Results   Component Value Date    LABA1C 6.4 09/17/2021    LABA1C 6.3 09/16/2021    LABA1C 7.6 07/06/2021     Lab Results   Component Value Date    .0 09/17/2021    .1 09/16/2021    .4 07/06/2021     Reviewed s/s of chf exacerbations. Patient is aware of how to assess for symptom changes. He is unable to weigh at home. Uses leg measurements and abdominal measurement. Reinforced low sodium diet and fluid restrictions with holiday meal. Stated the meal will be at his house so it will be more controlled. Plan      Follow up call in 2 weeks  Continue to follow for caregiver support needs  CHF and diabetes support ongoing. Care Coordination Interventions    Program Enrollment: Complex Care  Referral from Primary Care Provider: No  Suggested Interventions and Community Resources  Home Care Waiver: Completed (Comment: active with Waiver program )  Home Health Services: Completed (Comment: [facility] for skilled services)  Medi Set or Pill Pack: Declined (Comment: manages his meds )  Transportation Support: Declined  Zone Management Tools: Completed (Comment: chf and diabetes zone tools )         Goals Addressed    None         Prior to Admission medications    Medication Sig Start Date End Date Taking?  Authorizing Provider   apixaban (ELIQUIS) 5 MG TABS tablet Take 1 tablet by mouth 2 times daily TAKE ONE TABLET BY MOUTH TWICE A DAY 11/18/21   Melo Parson MD   metoprolol succinate (TOPROL XL) 50 MG extended release tablet Take 1 tablet by mouth 2 times daily 11/18/21 2/16/22  Melo Parson MD   torsemide (DEMADEX) 20 MG tablet TAKE FOUR TABLETS BY MOUTH DAILY 11/15/21   Raul Correia MD   torsemide (DEMADEX) 20 MG tablet Take 4 tablets by mouth daily 11/13/21   Juany Gomez MD   Fluticasone furoate-vilanterol (BREO ELLIPTA) 200-25 MCG/INH AEPB inhaler Inhale 1 puff into the lungs daily 11/12/21   Colleen Bauer MD   albuterol sulfate HFA (VENTOLIN HFA) 108 (90 Base) MCG/ACT inhaler Inhale 2 puffs into the lungs 4 times daily as needed for Wheezing 11/12/21   Colleen Bauer MD   albuterol (PROVENTIL) (5 MG/ML) 0.5% nebulizer solution Take 0.5 mLs by nebulization 4 times daily as needed for Wheezing 11/12/21 11/12/22  Colleen Bauer MD   potassium chloride (KLOR-CON M) 20 MEQ extended release tablet Take 1 tablet by mouth daily 10/29/21   Raul Correia MD   pantoprazole (PROTONIX) 40 MG tablet Take 1 tablet by mouth daily 10/29/21   Raul Correia MD   traZODone (DESYREL) 50 MG tablet TAKE ONE TABLET BY MOUTH ONCE NIGHTLY 10/29/21   Raul Correia MD   metFORMIN (GLUCOPHAGE) 1000 MG tablet Take 1 tablet by mouth 2 times daily (with meals) 10/29/21   Raul Correia MD   montelukast (SINGULAIR) 10 MG tablet TAKE ONE TABLET BY MOUTH DAILY 10/25/21   Colleen Bauer MD   insulin glargine (LANTUS) 100 UNIT/ML injection vial Inject 60 Units into the skin 2 times daily 10/1/21 12/30/21  Ayaz Bundy PA-C   insulin lispro (HUMALOG) 100 UNIT/ML injection vial Inject 20 Units into the skin 3 times daily (before meals) INJECT 20 UNITS UNDER THE SKIN THREE TIMES A DAY BEFORE MEALS + SLIDING SCALE 10/1/21   Aminata Bates PA-C   magnesium oxide (MAG-OX) 400 (241.3 Mg) MG TABS tablet Take 3 tablets by mouth 2 times daily 10/1/21   Aminata Bates PA-C Menthol-Methyl Salicylate (THERA-GESIC) 0.5-15 % CREA Apply topically as needed     Historical Provider, MD   Respiratory Therapy Supplies (ONE FLOW SPIROMETER) TRAE To be used PRN. 6/10/21   Arely Eisenberg MD   Insulin Syringe-Needle U-100 ([employer] INSULIN SYRINGE) 31G X 5/16\" 1 ML MISC 1 each by Does not apply route daily 4/13/21   NONI Esteves   polyethylene glycol [facility]) 17 GM/SCOOP powder Take 1 scoop daily. Patient taking differently: as needed Take 1 scoop daily. 9/4/20   Lenora Campos MD   senna (SENNA-LAX) 8.6 MG tablet TAKE TWO TABLETS BY MOUTH DAILY 8/28/20   Lenora Campos MD   docusate sodium (STOOL SOFTENER) 100 MG capsule TAKE TWO CAPSULES BY MOUTH DAILY 8/28/20   Lenora Campos MD   Alirocumab (PRALUENT) 150 MG/ML SOAJ Inject 150 mg into the skin every 14 days  7/21/20   Historical Provider, MD   ferrous sulfate (IRON 325) 325 (65 Fe) MG tablet TAKE ONE TABLET BY MOUTH DAILY WITH BREAKFAST 5/15/20   Lenora Campos MD   nitroGLYCERIN (NITROSTAT) 0.4 MG SL tablet up to max of 3 total doses. If no relief after 1 dose, call 911. 5/15/20   Lenora Campos MD   Insulin Syringe-Needle U-100 ([employer] INSULIN SYRINGE) 31G X 5/16\" 0.5 ML MISC Use 4 times daily. DX: E11.9 1/30/20   Lenora Campos MD   Insulin Pen Needle ([employer] PEN NEEDLES 31G) 31G X 8 MM MISC Use with Humalog. DX:E11.9 12/17/19   Lenora Campos MD   cetirizine (ZYRTEC) 10 MG tablet TAKE ONE TABLET BY MOUTH DAILY 12/11/19   Lenora Campos MD   nystatin (MYCOSTATIN) 249764 UNIT/GM powder Mix with your zinc oxide paste, apply to groin rash 3 times daily as needed. 10/10/19   Nhung Lynn MD   blood glucose test strips (ONETOUCH VERIO) strip Use to test five times daily.   DX:E11.9 10/1/19   Lenora Campos MD   aspirin 81 MG tablet Take 81 mg by mouth nightly  10/16/17   Historical Provider, MD   cyclobenzaprine (FLEXERIL) 10 MG tablet Take 1 tablet by mouth 2 times daily as needed for Muscle spasms 1/19/17   Gildardo Pinon MD       Future Appointments   Date Time Provider Alyson Oroscoi   12/3/2021 10:15 AM MD Yolanda Smith [facility]   12/17/2021 10:15 AM MD Yolanda Smith [facility]   2/24/2022  1:15 PM Chula Lozada MD [facility]   3/1/2022 11:30 AM [facility]     ,   Diabetes Assessment    Medic Alert ID: Yes  Meal Planning: Plate Method, Avoidance of concentrated sweets, Carb counting   How often do you test your blood sugar?: Meals   Do you have barriers with adherence to non-pharmacologic self-management interventions? (Nutrition/Exercise/Self-Monitoring): No   Have you ever had to go to the ED for symptoms of low blood sugar?: No       No patient-reported symptoms   Do you have hyperglycemia symptoms?: No   Do you have hypoglycemia symptoms?: No   Last Blood Sugar Value: 100   Blood Sugar Monitoring Regimen: Before Meals, Morning Fasting   Blood Sugar Trends: No Change       and   Congestive Heart Failure Assessment    Are you currently restricting fluids?: Other (Comment: 1500 cc fluid restriction )  Do you understand a low sodium diet?: Yes  Do you understand how to read food labels?: Yes  How many restaurant meals do you eat per week?: 1-2  Do you salt your food before tasting it?: No         Symptoms:  None: Yes      Symptom course: stable  Patient-reported weight (lb):  (Comment: unable to weigh at home.  quadraplegic )  Weight trend: stable  Salt intake watch compared to last visit: stable

## 2021-11-23 NOTE — TELEPHONE ENCOUNTER
Patient taking 80 mg daily. Per Dr Jada Jean-Baptiste patient informed to contact his nephrologist to see if he can cut it down.
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
## 2021-11-29 NOTE — TELEPHONE ENCOUNTER
I called and spoke with pt, called in meds, and advised to call back in a few days if he is no better, pt voiced understanding, he will call back.

## 2021-11-29 NOTE — TELEPHONE ENCOUNTER
Rx Levaquin 500 mg PO daily x 7 days. If still symptomatic in next 2-3 days then call us back to get referral to hepatobiliary service at [facility] to consider re-attempt at cholecystectomy.

## 2021-12-03 NOTE — PROGRESS NOTES
[facility] Physician Orders and Discharge [facility]  [address], [address], [facility]  Telephone: [phone]      Fax: [phone]        Your home care [facility] wound-care supplies will be provided by: [facility] care     NAME: Dariel Ragland Jr   DATE of BIRTH:  1965  PRIMARY DIAGNOSIS FOR WOUND CARE CENTER:  Pressure Ulcer Stage 2     Wound cleansing:   Do not scrub or use excessive force. Wash hands with soap and water before and after dressing changes. Prior to applying a clean dressing, cleanse wound with normal saline, wound cleanser, or mild soap and water.  Ask your physician or nurse before getting the wound(s) wet in the shower.                Wound care for home:       Left Great Toe Wound:  Antibiotic ointment and tubular gauze, tape to foot and leave in place for the week then at home apply antibiitc ointment and a bandaid the 2nd week     Left Knee Wound:  Aquaphor at home as needed     Left Shin:  Polysporin and mepliex border leave in place for a week then can remove     Left Lateral/Posterior Leg:  Vashe,Collagen with or without silver, Mepilex border   Leave in place for a week then change next Friday     Make sure you are wearing your offloading boots when in bed      Right Knee:  Versatil (need a few pieces to cover)   Plain foam  cast padding x 2  Kerlix  Leave in place for a week then change next Friday     Chronic Open Back Wound:   Spray Hypochlorous Acid into wound let sit, do not rinse  Calmoseptine to alin wound  Silver Alginate for drainage as needed make sure to use 1 big piece over back wounds not individual pieces (we are applying today) and if home care uses drawtex make sure it is 1 piece as well  Cover with a Mepilex Border making sure to cover the skin tear   Change 3 x week     Scrotum/Buttocks/Posterior Thigh:   Calmoseptine to protect skin     Please note, all wounds (unless stated otherwise here) were mechanically debrided at the time of cleansing here in the wound-care center today, so a small amount of pain, drainage or bleeding from that process might be expected, and is normal.      All products for home use, including multiple products for a single wound if applicable, are medically necessary in order to achieve the best chance at timely wound healing. See provider documentation for details if needed.     Substituted dressings applied in the [address] [place] [address],[address] today, if applicable:           New orders for this week (labs, imaging, medications, etc.):           Additional instructions for specific diagnoses:     Continue to use the HIBICLENS (or the generic version) after your bath on the areas that are more troublesome such as your knee, chest and even around the back wound, make sure you let it dry, do not rinse        F/U Appointment is with Dr. Jose Lopez in 2 weeks   on                                   at                       .     Your nurse  is Heidi FLOWERS.      If we applied slip-resistant hospital socks today, be sure to remove them at least once a day to inspect your toes or feet, even if you're not changing the wraps or dressings underneath.  If you see anything concerning (redness, excess moisture, etc), please call and let us know right away.     Should you experience any significant changes in your wound(s) (including redness, increased warmth, increased pain, increased drainage, odor, or fever) or have questions about your wound care, please contact the [facility] at [phone] Monday-Thursday from 8:00 am - 4:30 pm, or Friday from 8:00 am - 2:30 pm.  If you need help with your wound outside these hours and cannot wait until we are again available, contact your home-care company (if applicable), your PCP, or go to the nearest emergency room

## 2021-12-06 NOTE — PLAN OF CARE
Continued improvement noted in overall wound presentation. Patient will continue with same wound care regime and follow up in wound clinic in 2 weeks. Discharge instructions reviewed with patient, all questions answered, copy given to patient. Dressings were applied to all wounds per M.D. Instructions at this visit.

## 2021-12-08 PROBLEM — L89.894 PRESSURE ULCER OF LEFT LEG, STAGE 4 (HCC): Status: ACTIVE | Noted: 2021-01-01

## 2021-12-08 PROBLEM — L89.312 PRESSURE ULCER OF ISCHIUM, RIGHT, STAGE II (HCC): Status: RESOLVED | Noted: 2019-03-05 | Resolved: 2021-01-01
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
## 2021-12-17 NOTE — PLAN OF CARE
Improvement noted in wounds with two presenting as healed. No changes in overall wound care patient is to follow up in wound clinic in 3 weeks as ordered. Home care remains in place for intermittent wound care in the home. Discharge instructions reviewed with patient, all questions answered, copy given to patient. Dressings were applied to all wounds per M.D. Instructions at this visit.

## 2021-12-17 NOTE — PROGRESS NOTES
[facility] Physician Orders and Discharge [facility]  [address], [address] [facility]  Telephone: [phone]      Fax: [phone]        Your home care [facility] wound-care supplies will be provided by: [facility] care     NAME: Marguerite Fields Jr   DATE of BIRTH:  1965  PRIMARY DIAGNOSIS FOR WOUND CARE CENTER:  Pressure Ulcer Stage 2     Wound cleansing:   Do not scrub or use excessive force. Wash hands with soap and water before and after dressing changes. Prior to applying a clean dressing, cleanse wound with normal saline, wound cleanser, or mild soap and water. Ask your physician or nurse before getting the wound(s) wet in the shower.                Wound care for home:        Left Great Toe Wound:  Antibiotic ointment and tubular gauze, tape to foot and leave in place for the week then at home apply antibiitc ointment and a bandaid the 2nd week     Left Knee Wound:  Aquaphor at home as needed      Left Shin:  Polysporin and mepliex border leave in place for a week then can remove     Left Lateral/Posterior Leg:  Vashe,Collagen with or without silver, Mepilex border   Leave in place for a week then change next Friday     Make sure you are wearing your offloading boots when in bed      Right Knee:  Versatil (need a few pieces to cover)   Plain foam  cast padding x 2  Kerlix  Leave in place until the Monday after [place] then home care can change     Chronic Open Back Wound:   Dr. Renetta Menjivar used Silver Nitrate on your wound today.   The wound will be black or silver in appearance  for the next several days, this is normal.   Spray Hypochlorous Acid into wound let sit, do not rinse  Calmoseptine to alin wound  Silver Alginate for drainage as needed make sure to use 1 big piece over back wounds not individual pieces (we are applying today) and if home care uses drawtex make sure it is 1 piece as well  Cover with a Mepilex Border making sure to cover the skin tear   Change 3 x week     Scrotum/Buttocks/Posterior Thigh:   Calmoseptine to protect skin     Please note, all wounds (unless stated otherwise here) were mechanically debrided at the time of cleansing here in the wound-care center today, so a small amount of pain, drainage or bleeding from that process might be expected, and is normal.      All products for home use, including multiple products for a single wound if applicable, are medically necessary in order to achieve the best chance at timely wound healing. See provider documentation for details if needed.     Substituted dressings applied in the [facility] today, if applicable:           New orders for this week (labs, imaging, medications, etc.):           Additional instructions for specific diagnoses:     Continue to use the HIBICLENS (or the generic version) after your bath on the areas that are more troublesome such as your knee, chest and even around the back wound, make sure you let it dry, do not rinse        F/U Appointment is with Dr. Stephie White in 3 weeks Corby VILLAR                       .     Your nurse  is Heidi FLOWERS.      If we applied slip-resistant hospital socks today, be sure to remove them at least once a day to inspect your toes or feet, even if you're not changing the wraps or dressings underneath.  If you see anything concerning (redness, excess moisture, etc), please call and let us know right away.     Should you experience any significant changes in your wound(s) (including redness, increased warmth, increased pain, increased drainage, odor, or fever) or have questions about your wound care, please contact the [address] [place] [address] at [phone] Monday-Thursday from 8:00 am - 4:30 pm, or Friday from 8:00 am - 2:30 pm.  If you need help with your wound outside these hours and cannot wait until we are again available, contact your home-care company (if

## 2021-12-20 NOTE — CARE COORDINATION
Ambulatory Care Coordination Note  12/20/2021  CM Risk Score: 15  Charlson 10 Year Mortality Risk Score: 100%     ACC: Tay Jean-Baptiste RN    Summary Note: ACM spoke with patient for Gabriel collier in call. He reports that he was seen at wound care and labs were ordered. He has some intermittent shortness of breath, denies chest pain. He does not have in hand or facial edema. We discussed s/s of chf exacerbation. He is able to speak to how he manages his diet, fluid restrictions, and symptoms. Message to PCP related to recent elevated BNP. He continues to not have a  Home health aid for 58 hours of care that he is eligible through waiver program. This is still unfilled to due to employment shortage. Holiday \"Gabriel collier in Call\"  Discussed CHF initiative ,Food swaps, zone tool, med compliance  chf education     Plan    follow up call later this week         Care Coordination Interventions    Program Enrollment: Complex Care  Referral from Primary Care Provider: No  Suggested Interventions and Community Resources  Home Care Waiver: Completed (Comment: active with Waiver program )  Home Health Services: Completed (Comment: [facility] for [facility] services)  Medi Set or Pill Pack: Declined (Comment: manages his meds )  Transportation Support: Declined  Zone Management Tools: Completed (Comment: chf and diabetes zone tools )         Goals Addressed                 This Visit's Progress       Care Coordination     Nutrition Plan   No change     I will continue to stay on my diabetic diet and limit my fluid intake as recommended. Barriers: lack of support and overwhelmed by complexity of regimen  Plan for overcoming my barriers: N/A  Confidence: 7/10  Anticipated Goal Completion Date: 12/21    Patient reports he is following his diet guidelines and is staying within range for his 1200 cc fluid limit daily. Some baseline edema due to his immobility.  Follows low sodium diet 11/2/21 trb        Self Monitoring   On track (KLOR-CON M) 20 MEQ extended release tablet Take 1 tablet by mouth daily 10/29/21   Armaan Martínez MD   pantoprazole (PROTONIX) 40 MG tablet Take 1 tablet by mouth daily 10/29/21   Armaan Martínez MD   traZODone (DESYREL) 50 MG tablet TAKE ONE TABLET BY MOUTH ONCE NIGHTLY 10/29/21   Armaan Martínez MD   metFORMIN (GLUCOPHAGE) 1000 MG tablet Take 1 tablet by mouth 2 times daily (with meals) 10/29/21   Armaan Martínez MD   montelukast (SINGULAIR) 10 MG tablet TAKE ONE TABLET BY MOUTH DAILY 10/25/21   Veronique Yancey MD   insulin glargine (LANTUS) 100 UNIT/ML injection vial Inject 60 Units into the skin 2 times daily 10/1/21 12/30/21  Pierre Bundy PA-C   insulin lispro (HUMALOG) 100 UNIT/ML injection vial Inject 20 Units into the skin 3 times daily (before meals) INJECT 20 UNITS UNDER THE SKIN THREE TIMES A DAY BEFORE MEALS + SLIDING SCALE 10/1/21   Fide Pino PA-C   magnesium oxide (MAG-OX) 400 (241.3 Mg) MG TABS tablet Take 3 tablets by mouth 2 times daily 10/1/21   Pierre Bundy PA-C   Menthol-Methyl Salicylate (THERA-GESIC) 0.5-15 % CREA Apply topically as needed     Historical Provider, MD   Respiratory Therapy Supplies ([facility]) TRAE To be used PRN. 6/10/21   Veronique Yancey MD   Insulin Syringe-Needle U-100 ([employer] INSULIN SYRINGE) 31G X 5/16\" 1 ML MISC 1 each by Does not apply route daily 4/13/21   NONI Herrera   polyethylene glycol [facility]) 17 GM/SCOOP powder Take 1 scoop daily. Patient taking differently: as needed Take 1 scoop daily.  9/4/20   Armaan Martínez MD   senna (SENNA-LAX) 8.6 MG tablet TAKE TWO TABLETS BY MOUTH DAILY 8/28/20   Armaan Martínez MD   docusate sodium (STOOL SOFTENER) 100 MG capsule TAKE TWO CAPSULES BY MOUTH DAILY 8/28/20   Armaan Martínez MD   Alirocumab (PRALUENT) 150 MG/ML SOAJ Inject 150 mg into the skin every 14 days  7/21/20   Historical Provider, MD   ferrous sulfate (IRON 325) 325 (65 Fe) MG tablet TAKE ONE TABLET BY MOUTH DAILY WITH BREAKFAST 5/15/20   Anika Jones MD   Insulin Syringe-Needle U-100 ([employer] INSULIN SYRINGE) 31G X 5/16\" 0.5 ML MISC Use 4 times daily. DX: E11.9 1/30/20   Anika Jones MD   Insulin Pen Needle ([employer] PEN NEEDLES 31G) 31G X 8 MM MISC Use with Humalog. DX:E11.9 12/17/19   Anika Jones MD   cetirizine (ZYRTEC) 10 MG tablet TAKE ONE TABLET BY MOUTH DAILY 12/11/19   Anika Jones MD   nystatin (MYCOSTATIN) 953201 UNIT/GM powder Mix with your zinc oxide paste, apply to groin rash 3 times daily as needed. 10/10/19   Milla Monzon MD   blood glucose test strips (ONETOUCH VERIO) strip Use to test five times daily. DX:E11.9 10/1/19   Anika Jones MD   aspirin 81 MG tablet Take 81 mg by mouth nightly  10/16/17   Historical Provider, MD   cyclobenzaprine (FLEXERIL) 10 MG tablet Take 1 tablet by mouth 2 times daily as needed for Muscle spasms 1/19/17   Crow Luciano MD       Future Appointments   Date Time Provider Alyson Cardona   12/29/2021  1:30 PM [facility] ECHO ROOM 01 [facility]   1/7/2022 10:15 AM MD Raj Rizvi   1/21/2022  1:00 PM Lucian Smith MD [facility] CLER CAR [facility]   3/1/2022 11:30 AM Bonnie Paredes MD [facility]     ,   Diabetes Assessment    Medic Alert ID: Yes  Meal Planning: Plate Method, Avoidance of concentrated sweets, Carb counting   How often do you test your blood sugar?: Meals   Do you have barriers with adherence to non-pharmacologic self-management interventions?  (Nutrition/Exercise/Self-Monitoring): No   Have you ever had to go to the ED for symptoms of low blood sugar?: No           and   Congestive Heart Failure Assessment    Are you currently restricting fluids?: Other (Comment: 1500 cc fluid restriction )  Do you understand a low sodium diet?: Yes  Do you understand how to read food labels?: Yes  How many restaurant meals do you eat per week?: 1-2  Do you salt your food before tasting it?: No         Symptoms:

## 2021-12-26 PROBLEM — L02.212 ABSCESS OF BACK: Status: ACTIVE | Noted: 2021-01-01
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
## 2021-12-28 NOTE — TELEPHONE ENCOUNTER
If bed bound then getting weights will be difficult and likely inaccurate. If gets worsening swelling then take extra torsemide table for couple of days PRN (currently takes FOUR 20mg tablets daily). Make sure to watch salt and drink < 64 ounces total fluids daily. Thanks.

## 2021-12-28 NOTE — TELEPHONE ENCOUNTER
Pt called and sts that on 12/25/21 he started to get some edema in hands and legs and SOB. Pt sts that @ last time he was weighed he was 240lbs. Asked pt if there was anyway to weigh himself but pt is bed bound.  Please advise, thank you  Pt is currently taking Torsemide 20mg 4 tablets QD

## 2021-12-29 PROBLEM — I50.23 ACUTE ON CHRONIC SYSTOLIC CHF (CONGESTIVE HEART FAILURE) (HCC): Status: ACTIVE | Noted: 2021-01-01

## 2021-12-29 PROBLEM — I50.33 ACUTE ON CHRONIC DIASTOLIC CHF (CONGESTIVE HEART FAILURE) (HCC): Status: ACTIVE | Noted: 2021-01-01

## 2021-12-29 NOTE — PROGRESS NOTES
Admit to PCU on tele  [facility]    Janice Robin [facility]  12/29/2021

## 2021-12-29 NOTE — ED PROVIDER NOTES
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 (HUMALOG) injection vial 0-6 Units (has no administration in time range)   furosemide (LASIX) injection 40 mg (40 mg IntraVENous Given 12/29/21 1323)   acetaminophen (TYLENOL) tablet 1,000 mg (1,000 mg Oral Given 12/29/21 1744)     ED Course as of 12/29/21 1914   Wed Dec 29, 2021   1407 Troponin(!): 0.22 [TM]      ED Course User Index  [TM] Talita Rey, APRN - CNP        The patient has a chronically elevated troponin. His BNP is elevated. The patient is also mildly hypoxic on room air. His oxygen saturation was 89%. Patient displays no other acute complaints will be mated to the hospital service. Discussed case with hospitalist and they will evaluate the patient for admission. FINAL IMPRESSION      1. Acute on chronic congestive heart failure, unspecified heart failure type [facility])          DISPOSITION/PLAN   DISPOSITION Admitted 12/29/2021 04:36:09 PM      PATIENT REFERREDTO:  No follow-up provider specified.     DISCHARGE MEDICATIONS:  New Prescriptions    No medications on file       DISCONTINUED MEDICATIONS:  Discontinued Medications    No medications on file              (Please note that portions ofthis note were completed with a voice recognition program.  Efforts were made to edit the dictations but occasionally words are mis-transcribed.)    DARIAN Ruiz CNP (electronically signed)            DARIAN Ruiz CNP  12/29/21 1914

## 2021-12-29 NOTE — ED NOTES
1535- Perfect serve was sent to [address]  12/29/21 1536    1546- [facility], NP was in the ED , spoke with Merritt Kerns  12/29/21 2444

## 2021-12-29 NOTE — TELEPHONE ENCOUNTER
Called pt to relay message and pt sts that he is currently in [facility] ER.  MARISABEL, GEOFFREYI
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
## 2021-12-29 NOTE — ED PROVIDER NOTES
I independently examined and evaluated Jamel Ayers. In brief, this 56-year-old male is presenting with cough, shortness of breath, increasing edema for the last 5 days. He has been taking extra dose of torsemide daily during this timeframe without improvement. He has been having hypoxia. .    Focused exam revealed   General: Alert, no acute distress, patient resting comfortably   Skin: warm, intact, no pallor noted   Head: Normocephalic, atraumatic   Eye: Normal conjunctiva   Cardiac: Normal peripheral perfusion  Respiratory: No acute distress on 2 L nasal cannula  Musculoskeletal: No deformity, full ROM. Neurological: alert and oriented, normal sensory and motor observed. Psychiatric: Cooperative    ED course: Cardiac work-up obtained. BNP is significantly elevated. He also has severe peripheral edema. Treat with IV Lasix. Plan for admission for further diuresis. ECG    The Ekg interpreted by me shows sinus rhythm with sinus arrhythmia and a rate of 93 bpm.  Left axis deviation. No acute injury pattern. , QRS 92, QTc 457    All diagnostic, treatment, and disposition decisions were made by myself in conjunction with the advanced practice provider. For all further details of the patient's emergency department visit, please see the advanced practice provider's documentation. Comment: Please note this report has been produced using speech recognition software and may contain errors related to that system including errors in grammar, punctuation, and spelling, as well as words and phrases that may be inappropriate. If there are any questions or concerns please feel free to contact the dictating provider for clarification.        John Nelson, DO  12/29/21 0546

## 2021-12-30 NOTE — PROGRESS NOTES
Patient admitted to room 315 from ED. Patient oriented to room, call light, bed rails, phone, lights and bathroom. Patient instructed about the schedule of the day including: vital sign frequency, lab draws, possible tests, frequency of MD and staff rounds, daily weights, I &O's and prescribed diet. bed alarm in place, patient aware of placement and reason. Telemetry box in place, patient aware of placement and reason. Bed locked, in lowest position, side rails up 2/4, call light within reach. Recliner Assessment  Patient is not able to demonstrate the ability to move from a reclining position to an upright position within the recliner due to paraplegic. 4 Eyes Skin Assessment     The patient is being assess for   Admission    I agree that 2 RN's have performed a thorough Head to Toe Skin Assessment on the patient. ALL assessment sites listed below have been assessed. Areas assessed for pressure by both nurses:   [x]   Head, Face, and Ears   [x]   Shoulders, Back, and Chest, Abdomen  [x]   Arms, Elbows, and Hands   [x]   Coccyx, Sacrum, and Ischium  [x]   Legs, Feet, and Heels   See wound pictures     Skin Assessed Under all Medical Devices by both nurses:  langley and colostemy              All Mepilex Borders were peeled back and area peeked at by both nurses:  Yes  Please list where Mepilex Borders are located:  Back, coccyx, right knee, left shin, back of left sin             **SHARE this note so that the co-signing nurse is able to place an eSignature**    Co-signer eSignature: {Esignature:[id number]}    Does the Patient have Skin Breakdown related to pressure?   Yes [facility] WOUND CARE was Initiated documentation include the Chetna-wound, Wound Assessment, Measurements, Dressing Treatment, Drainage, and Color\",     (Insert Photo here                            )         Ismael Prevention initiated:  Yes   Wound Care Orders initiated:  Yes      [address]  nurse consulted for Pressure Injury (Stage 3,4, Unstageable, DTI, NWPT, Complex wounds)and New or Established Ostomies:  Yes      Primary Nurse eSignature: Electronically signed by Cami Bermudez RN on 12/30/21 at 3:00 AM EST

## 2021-12-30 NOTE — CONSULTS
Spoke with patient. All dressings are clean dry and intact and per pt, no due to be changed. Pt goes to the [facility] and followed By Dr Bipin Carroll regularly. Pt knows own wound care well. Wounds are chronic and unchanged for the most part from photos. Discussed dressings with pt, will place orders for dressing change sas done in clinic. Dolphin mattress in room and pt being transferred to new surface.

## 2021-12-30 NOTE — PROGRESS NOTES
Comprehensive Nutrition Assessment    Type and Reason for Visit:  Consult,Initial (consult for CHF nutrition education; patient has pressure wounds)    Nutrition Recommendations/Plan:   1. Continue ADULT DIET; Regular; 4 carb choices per meal; Low-Na diet order. 2. Added Ensure high-protein with meals since patient has multiple pressure wounds. 3. Monitor appetite, meal intake, and acceptance/intake of ONS. 4. Monitor nutrition-related labs, ostomy output/function, and weight trends. Nutrition Assessment:  patient is adequately nourished AEB adequate po intake at home PTA, however, patient is at risk for nutritional compromise d/t multiple pressure wounds (chronic) are present and altered nutrition-related labs + patient is paraplegic (cannot feel anything from the chest down) and requires 24/7 care at home; will continue ADULT DIET; Regular; 4 carb choices per meal; Low-Na diet order and will add Ensure high-protein with meals    Malnutrition Assessment:  Malnutrition Status: At risk for malnutrition     Context:  Chronic Illness     Findings of the 6 clinical characteristics of malnutrition:  Energy Intake:  No significant decrease in energy intake  Weight Loss:  No significant weight loss     Body Fat Loss:  No significant body fat loss     Muscle Mass Loss:  No significant muscle mass loss    Fluid Accumulation:  7 - Severe Generalized (generalized + 3 pitting edema noted)   Strength:  Not Performed    Estimated Daily Nutrient Needs:  Energy (kcal):  1905 - 2286 kcals based on 15-18 kcals/kg/CBW; Weight Used for Energy Requirements:  Current     Protein (g):  172 - 189 g protein based on 2.0-2.2 g/kg/IBW;  Weight Used for Protein Requirements:  Ideal        Fluid (ml/day):  1905 - 2286 ml; Method Used for Fluid Requirements:  1 ml/kcal      Nutrition Related Findings:  patient has received CHF nutrition education in the past; he follows a low-Na diet at home + diabetic restrictions; he also adheres to his daily FR at home; patient is paraplegic (no feeling from chest down) and is unable to weigh himself accurately; recently, patient's mother and daughter have been providing 24/7 care for patient since his home health aide has been on a hold and agency is trying to find a replacement; patient is A & O x 4; abdomen is round, non-tender, soft, and bowel sounds are active; + colostomy; + 3 generalized pitting edema noted; Na, Cl, and h/h are low; BUN is elevated; blood glucose trends are elevated; patient has eliquis, colace, iron, lasix, 40 units lantus BID, med-dose SSI, mag-ox, protonix, and trazadone ordered at this time      Wounds:  Multiple,Pressure Injury,Full Thickness,Stage II,Stage IV (stage 4 pressure wound on LLE; full thickness on mid-back (surgical); full thickness L knee (trauma); stage 2 pressure wound R knee)       Current Nutrition Therapies:    ADULT DIET; Regular; 4 carb choices (60 gm/meal); Low Sodium (2 gm)  ADULT ORAL NUTRITION SUPPLEMENT; Breakfast, Lunch, Dinner;  Low Calorie/High Protein Oral Supplement    Anthropometric Measures:  · Height: 6' 2\" (188 cm)  · Current Body Weight: 280 lb 9.6 oz (127.3 kg) (obtained on 12/30/21)   · Admission Body Weight: 275 lb 11.2 oz (125.1 kg) (obtained on 12/29/21; actual weight)    · Usual Body Weight: 272 lb 8 oz (123.6 kg) (obtained on 1/26/21; actual weight)     · Ideal Body Weight: 190 lbs; % Ideal Body Weight 147.7 %   · BMI: 36  · Adjusted Body Weight: 301.6; Paraplegia   · Adjusted BMI: 38.7    · BMI Categories: Obese Class 2 (BMI 35.0 -39.9)       Nutrition Diagnosis:   · Altered nutrition-related lab values related to cardiac dysfunction,endocrine dysfuntion,impaired respiratory function,altered GI structure as evidenced by wounds,BMI,lab values,localized or generalized fluid accumulation      Nutrition Interventions:   Food and/or Nutrient Delivery:  Continue Current Diet,Start Oral Nutrition Supplement  Nutrition Education/Counseling:  Education not indicated   Coordination of Nutrition Care:  Continue to monitor while inpatient    Goals:  patient will consume 75% or greater of meals on ADULT DIET;  Regular; 4 carb choices per meal; Low-Na diet order x 3 meals per day + he will consume 75% of greater of Ensure high-protein with meals       Nutrition Monitoring and Evaluation:   Behavioral-Environmental Outcomes:  None Identified   Food/Nutrient Intake Outcomes:  Food and Nutrient Intake,Supplement Intake  Physical Signs/Symptoms Outcomes:  Biochemical Data,GI Status,Fluid Status or Edema,Hemodynamic Status,Weight,Skin,Nutrition Focused Physical Findings,Meal Time Behavior     Discharge Planning:    Continue current diet,Continue Oral Nutrition Supplement     Electronically signed by Dameon Pickett RD, LD on 12/30/21 at 3:43 PM EST    Contact: [phone]

## 2021-12-30 NOTE — PLAN OF CARE
Nutrition Problem #1: Altered nutrition-related lab values  Intervention: Food and/or Nutrient Delivery: Continue Current Diet,Start Oral Nutrition Supplement  Nutritional Goals: patient will consume 75% or greater of meals on ADULT DIET;  Regular; 4 carb choices per meal; Low-Na diet order x 3 meals per day + he will consume 75% of greater of Ensure high-protein with meals

## 2021-12-30 NOTE — PROGRESS NOTES
Patient with complaints of shortness of breath. 40mg IV lasix given. Patient abdomen and legs with pitting edema. Shallow breathing. O2 saturation on 2L nasal cannula is 95%. Placed on 3.5 liters for comfort. Will continue to monitor. Vital signs stable.

## 2021-12-30 NOTE — ACP (ADVANCE CARE PLANNING)
Advance Care Planning     General Advance Care Planning (ACP) Conversation    Date of Conversation: 12/29/2021  Conducted with: Patient with [address]: Next of Kin by law (only applies in absence of above) (name) Enmanuel Wray (daughter)    Healthcare Decision Maker:    Primary Decision Maker: Jonnie Oralia Child - [phone]    Primary Decision Maker: Cathy Bal - Spouse - [phone]  Click here to complete [address] including selection of the Healthcare Decision Maker Relationship (ie \"Primary\"). Today we documented Decision Maker(s) consistent with Legal Next of Kin hierarchy.     Content/Action Overview:    Reviewed DNR/DNI and patient elects Full Code (Attempt Resuscitation)      Length of Voluntary ACP Conversation in minutes:  <16 minutes (Non-Billable)    Finesse Seals RN

## 2021-12-30 NOTE — CARE COORDINATION
Case Management Assessment  Initial Evaluation      Patient Name: Meghan Mercer  YOB: 1965  Diagnosis: Acute on chronic diastolic CHF (congestive heart failure) ([address] [I50.33]  Acute on chronic congestive heart failure, unspecified heart failure type ([facility] [I50.9]  Date / Time: 12/29/2021 11:35 AM    Admission status/Date:  12/29/2021  Chart Reviewed: Yes      Patient Interviewed: Rachel Brown:  YES- daughterReanna    Hospitalization in the last 30 days:  No      Health Care Decision Maker :   Primary Decision MakeElif Dickens - Child - [phone]    Primary Decision Maker: Maxineira Armani - Spouse - [phone]    (CM - must 1st enter selection under Navigator - emergency contact- [address] Relationship and pick relationship)   Who do you trust or have selected to make healthcare decisions for you    Current PCP:  [address] [facility]  Precert required for SNF : NA     3 night stay required - WAIVED    ADLS  Support Systems/Care Needs:    Transportation: EMS transportation (Prefers [facility])    Meal Preparation: family    Housing  Living Arrangements: tri-level home with mother and daughter  Steps: ramped entry  Intent for return to present living arrangements: Yes  Identified Issues:     [address] with [address] : Yes - [place] [facility] (SN 2 x week)      Passport/Waiver : WAIVER   :  Stephane Denton                    Phone Number:  [phone]  Passport/Waiver Services: NA          Durable Medical Equiptment   DME Provider:   Equipment:   Walker___Cane___RTS___ BSC___Shower Chair___Hospital Bed_X__W/C_power chair___Other_hoyer lift_______  02 at ____Liter(s)---wears(frequency)_______ HHN ___ CPAP___ BiPap___   N/A____      Home O2 Use :  No        Community Service Affiliation  Dialysis:  No    · Agency:  · Location:  · Dialysis Schedule:  · Phone:   · Fax:     Other Community Services: [facility] (every other week) - Dr. Michael Capps: Explained Case Management role/services. Chart reviewed. Met with pt's daughter, Lissy Scales (Provides 24/7 care) and explained the role of the CM. Passport HHA is oh hold and has been for months. They have been unable to find a replacement. Pt's mother and daughter have been providing all of his care since. +Active at [facility] (every other week) and [facility] 2-3 SN visits per week. Notification call to [facility] and request to add HHA. Notification call to Lucia Dos Santos (WAIVER CM). +CM following.

## 2021-12-30 NOTE — PROGRESS NOTES
RT Inhaler-Nebulizer Bronchodilator Protocol Note    There is a bronchodilator order in the chart from a provider indicating to follow the RT Bronchodilator Protocol and there is an Initiate RT Inhaler-Nebulizer Bronchodilator Protocol order as well (see protocol at bottom of note). CXR Findings:  XR CHEST PORTABLE    Result Date: 12/29/2021  Nonspecific bibasilar opacities favored to represent atelectasis. The findings from the last RT Protocol Assessment were as follows:   History Pulmonary Disease: (P) Chronic pulmonary disease  Respiratory Pattern: (P) Regular pattern and RR 12-20 bpm  Breath Sounds: (P) Slightly diminished and/or crackles  Cough: (P) Strong, spontaneous, non-productive  Indication for Bronchodilator Therapy: (P) Decreased or absent breath sounds  Bronchodilator Assessment Score: (P) 4    Aerosolized bronchodilator medication orders have been revised according to the RT Inhaler-Nebulizer Bronchodilator Protocol below. Respiratory Therapist to perform RT Therapy Protocol Assessment initially then follow the protocol. Repeat RT Therapy Protocol Assessment PRN for score 0-3 or on second treatment, BID, and PRN for scores above 3. No Indications - adjust the frequency to every 6 hours PRN wheezing or bronchospasm, if no treatments needed after 48 hours then discontinue using Per Protocol order mode. If indication present, adjust the RT bronchodilator orders based on the Bronchodilator Assessment Score as indicated below. Use Inhaler orders unless patient has one or more of the following: on home nebulizer, not able to hold breath for 10 seconds, is not alert and oriented, cannot activate and use MDI correctly, or respiratory rate 25 breaths per minute or more, then use the equivalent nebulizer order(s) with same Frequency and PRN reasons based on the score. If a patient is on this medication at home then do not decrease Frequency below that used at home.     0-3 - enter or revise RT bronchodilator order(s) to equivalent RT Bronchodilator order with Frequency of every 4 hours PRN for wheezing or increased work of breathing using Per Protocol order mode. 4-6 - enter or revise RT Bronchodilator order(s) to two equivalent RT bronchodilator orders with one order with BID Frequency and one order with Frequency of every 4 hours PRN wheezing or increased work of breathing using Per Protocol order mode. 7-10 - enter or revise RT Bronchodilator order(s) to two equivalent RT bronchodilator orders with one order with TID Frequency and one order with Frequency of every 4 hours PRN wheezing or increased work of breathing using Per Protocol order mode. 11-13 - enter or revise RT Bronchodilator order(s) to one equivalent RT bronchodilator order with QID Frequency and an Albuterol order with Frequency of every 4 hours PRN wheezing or increased work of breathing using Per Protocol order mode. Greater than 13 - enter or revise RT Bronchodilator order(s) to one equivalent RT bronchodilator order with every 4 hours Frequency and an Albuterol order with Frequency of every 2 hours PRN wheezing or increased work of breathing using Per Protocol order mode. RT to enter RT Home Evaluation for COPD & MDI Assessment order using Per Protocol order mode.     Electronically signed by Sapna Jones RCP on 12/30/2021 at 10:14 AM

## 2021-12-30 NOTE — PROGRESS NOTES
12/30/21 0000   RT Protocol   History Pulmonary Disease 2   Respiratory pattern 0   Breath sounds 2   Cough 0   Indications for Bronchodilator Therapy Decreased or absent breath sounds   Bronchodilator Assessment Score 4   RT Inhaler-Nebulizer Bronchodilator Protocol Note    There is a bronchodilator order in the chart from a provider indicating to follow the RT Bronchodilator Protocol and there is an Initiate RT Inhaler-Nebulizer Bronchodilator Protocol order as well (see protocol at bottom of note). CXR Findings:  XR CHEST PORTABLE    Result Date: 12/29/2021  Nonspecific bibasilar opacities favored to represent atelectasis. The findings from the last RT Protocol Assessment were as follows:   History Pulmonary Disease: Chronic pulmonary disease  Respiratory Pattern: Regular pattern and RR 12-20 bpm  Breath Sounds: Slightly diminished and/or crackles  Cough: Strong, spontaneous, non-productive  Indication for Bronchodilator Therapy: Decreased or absent breath sounds  Bronchodilator Assessment Score: 4    Aerosolized bronchodilator medication orders have been revised according to the RT Inhaler-Nebulizer Bronchodilator Protocol below. Respiratory Therapist to perform RT Therapy Protocol Assessment initially then follow the protocol. Repeat RT Therapy Protocol Assessment PRN for score 0-3 or on second treatment, BID, and PRN for scores above 3. No Indications - adjust the frequency to every 6 hours PRN wheezing or bronchospasm, if no treatments needed after 48 hours then discontinue using Per Protocol order mode. If indication present, adjust the RT bronchodilator orders based on the Bronchodilator Assessment Score as indicated below.   Use Inhaler orders unless patient has one or more of the following: on home nebulizer, not able to hold breath for 10 seconds, is not alert and oriented, cannot activate and use MDI correctly, or respiratory rate 25 breaths per minute or more, then use the equivalent nebulizer order(s) with same Frequency and PRN reasons based on the score. If a patient is on this medication at home then do not decrease Frequency below that used at home. 0-3 - enter or revise RT bronchodilator order(s) to equivalent RT Bronchodilator order with Frequency of every 4 hours PRN for wheezing or increased work of breathing using Per Protocol order mode. 4-6 - enter or revise RT Bronchodilator order(s) to two equivalent RT bronchodilator orders with one order with BID Frequency and one order with Frequency of every 4 hours PRN wheezing or increased work of breathing using Per Protocol order mode. 7-10 - enter or revise RT Bronchodilator order(s) to two equivalent RT bronchodilator orders with one order with TID Frequency and one order with Frequency of every 4 hours PRN wheezing or increased work of breathing using Per Protocol order mode. 11-13 - enter or revise RT Bronchodilator order(s) to one equivalent RT bronchodilator order with QID Frequency and an Albuterol order with Frequency of every 4 hours PRN wheezing or increased work of breathing using Per Protocol order mode. Greater than 13 - enter or revise RT Bronchodilator order(s) to one equivalent RT bronchodilator order with every 4 hours Frequency and an Albuterol order with Frequency of every 2 hours PRN wheezing or increased work of breathing using Per Protocol order mode.      Electronically signed by Trinity Parks RCP on 12/30/2021 at 12:10 AM

## 2021-12-30 NOTE — PROGRESS NOTES
Patient takes 5 max ox tabs every morning and 4 tabs at night. Patient takes 60 units lantus BID. Will address this with MD during morning rounds to get this updated in orders.

## 2021-12-30 NOTE — FLOWSHEET NOTE
12/30/21 0221   Vital Signs   Temp 96.6 °F (35.9 °C)   Temp Source Oral   Pulse 92   Heart Rate Source Monitor   Resp 20   /79   BP Location Left upper arm   Level of Consciousness Alert (0)   MEWS Score 1     Pt awake in bed. VS as shown above. Pt denies any further needs at this time. Call light in reach and bed in lowest position.
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
## 2021-12-31 NOTE — PROGRESS NOTES
RT Inhaler-Nebulizer Bronchodilator Protocol Note    There is a bronchodilator order in the chart from a provider indicating to follow the RT Bronchodilator Protocol and there is an Initiate RT Inhaler-Nebulizer Bronchodilator Protocol order as well (see protocol at bottom of note). CXR Findings:  XR CHEST PORTABLE    Result Date: 12/29/2021  Nonspecific bibasilar opacities favored to represent atelectasis. The findings from the last RT Protocol Assessment were as follows:   History Pulmonary Disease: Chronic pulmonary disease  Respiratory Pattern: Regular pattern and RR 12-20 bpm  Breath Sounds: Slightly diminished and/or crackles  Cough: Strong, spontaneous, non-productive  Indication for Bronchodilator Therapy: Decreased or absent breath sounds  Bronchodilator Assessment Score: 4    Aerosolized bronchodilator medication orders have been revised according to the RT Inhaler-Nebulizer Bronchodilator Protocol below. Respiratory Therapist to perform RT Therapy Protocol Assessment initially then follow the protocol. Repeat RT Therapy Protocol Assessment PRN for score 0-3 or on second treatment, BID, and PRN for scores above 3. No Indications - adjust the frequency to every 6 hours PRN wheezing or bronchospasm, if no treatments needed after 48 hours then discontinue using Per Protocol order mode. If indication present, adjust the RT bronchodilator orders based on the Bronchodilator Assessment Score as indicated below. Use Inhaler orders unless patient has one or more of the following: on home nebulizer, not able to hold breath for 10 seconds, is not alert and oriented, cannot activate and use MDI correctly, or respiratory rate 25 breaths per minute or more, then use the equivalent nebulizer order(s) with same Frequency and PRN reasons based on the score. If a patient is on this medication at home then do not decrease Frequency below that used at home.     0-3 - enter or revise RT bronchodilator order(s) to equivalent RT Bronchodilator order with Frequency of every 4 hours PRN for wheezing or increased work of breathing using Per Protocol order mode. 4-6 - enter or revise RT Bronchodilator order(s) to two equivalent RT bronchodilator orders with one order with BID Frequency and one order with Frequency of every 4 hours PRN wheezing or increased work of breathing using Per Protocol order mode. 7-10 - enter or revise RT Bronchodilator order(s) to two equivalent RT bronchodilator orders with one order with TID Frequency and one order with Frequency of every 4 hours PRN wheezing or increased work of breathing using Per Protocol order mode. 11-13 - enter or revise RT Bronchodilator order(s) to one equivalent RT bronchodilator order with QID Frequency and an Albuterol order with Frequency of every 4 hours PRN wheezing or increased work of breathing using Per Protocol order mode. Greater than 13 - enter or revise RT Bronchodilator order(s) to one equivalent RT bronchodilator order with every 4 hours Frequency and an Albuterol order with Frequency of every 2 hours PRN wheezing or increased work of breathing using Per Protocol order mode.        Electronically signed by Gaudencio Hussein RCP on 12/30/2021 at 9:20 PM

## 2021-12-31 NOTE — PLAN OF CARE
Problem: Falls - Risk of:  Goal: Will remain free from falls  Description: Will remain free from falls  Outcome: Ongoing  Goal: Absence of physical injury  Description: Absence of physical injury  Outcome: Ongoing     Problem: Skin Integrity:  Goal: Will show no infection signs and symptoms  Description: Will show no infection signs and symptoms  Outcome: Ongoing  Goal: Absence of new skin breakdown  Description: Absence of new skin breakdown  Outcome: Ongoing     Problem: Nutrition  Goal: Optimal nutrition therapy  12/31/2021 0023 by Juanita Sosa RN  Outcome: Ongoing  12/30/2021 1542 by Sera Levy RD, BRUNA  Outcome: Met This Shift  Goal: Understanding of nutritional guidelines  12/31/2021 0023 by Juanita Sosa RN  Outcome: Ongoing  12/30/2021 1542 by Sera Levy RD, BRUNA  Outcome: Met This Shift

## 2021-12-31 NOTE — PROGRESS NOTES
RT Inhaler-Nebulizer Bronchodilator Protocol Note    There is a bronchodilator order in the chart from a provider indicating to follow the RT Bronchodilator Protocol and there is an Initiate RT Inhaler-Nebulizer Bronchodilator Protocol order as well (see protocol at bottom of note). CXR Findings:  XR CHEST PORTABLE    Result Date: 12/29/2021  Nonspecific bibasilar opacities favored to represent atelectasis. The findings from the last RT Protocol Assessment were as follows:   History Pulmonary Disease: Chronic pulmonary disease  Respiratory Pattern: Regular pattern and RR 12-20 bpm  Breath Sounds: Slightly diminished and/or crackles  Cough: Strong, spontaneous, non-productive  Indication for Bronchodilator Therapy: Decreased or absent breath sounds  Bronchodilator Assessment Score: 4    Aerosolized bronchodilator medication orders have been revised according to the RT Inhaler-Nebulizer Bronchodilator Protocol below. Respiratory Therapist to perform RT Therapy Protocol Assessment initially then follow the protocol. Repeat RT Therapy Protocol Assessment PRN for score 0-3 or on second treatment, BID, and PRN for scores above 3. No Indications - adjust the frequency to every 6 hours PRN wheezing or bronchospasm, if no treatments needed after 48 hours then discontinue using Per Protocol order mode. If indication present, adjust the RT bronchodilator orders based on the Bronchodilator Assessment Score as indicated below. Use Inhaler orders unless patient has one or more of the following: on home nebulizer, not able to hold breath for 10 seconds, is not alert and oriented, cannot activate and use MDI correctly, or respiratory rate 25 breaths per minute or more, then use the equivalent nebulizer order(s) with same Frequency and PRN reasons based on the score. If a patient is on this medication at home then do not decrease Frequency below that used at home.     0-3 - enter or revise RT bronchodilator order(s) to equivalent RT Bronchodilator order with Frequency of every 4 hours PRN for wheezing or increased work of breathing using Per Protocol order mode. 4-6 - enter or revise RT Bronchodilator order(s) to two equivalent RT bronchodilator orders with one order with BID Frequency and one order with Frequency of every 4 hours PRN wheezing or increased work of breathing using Per Protocol order mode. 7-10 - enter or revise RT Bronchodilator order(s) to two equivalent RT bronchodilator orders with one order with TID Frequency and one order with Frequency of every 4 hours PRN wheezing or increased work of breathing using Per Protocol order mode. 11-13 - enter or revise RT Bronchodilator order(s) to one equivalent RT bronchodilator order with QID Frequency and an Albuterol order with Frequency of every 4 hours PRN wheezing or increased work of breathing using Per Protocol order mode. Greater than 13 - enter or revise RT Bronchodilator order(s) to one equivalent RT bronchodilator order with every 4 hours Frequency and an Albuterol order with Frequency of every 2 hours PRN wheezing or increased work of breathing using Per Protocol order mode. RT to enter RT Home Evaluation for COPD & MDI Assessment order using Per Protocol order mode.     Electronically signed by Hermiina Mejias RCP on 12/31/2021 at 8:08 AM

## 2021-12-31 NOTE — FLOWSHEET NOTE
12/31/21 0945   Vital Signs   Temp 97.3 °F (36.3 °C)   Temp Source Oral   Pulse 73   Heart Rate Source Monitor   Resp 18   /79   BP Location Left upper arm   Patient Position Semi fowlers   Level of Consciousness Alert (0)   MEWS Score 1   Patient Currently in Pain Denies   Pain Assessment   Pain Assessment 0-10   Pain Level 0   Oxygen Therapy   SpO2 92 %   O2 Device None (Room air)   O2 Flow Rate (L/min) 0 L/min     Pt assessment complete; see flow sheets. Vitals completed. Meds given per MAR. Pt currently resting in bed with the call light within reach. Pt denies any other care needs at this time. Pt stable at this time.     Roxy Garcia RN

## 2021-12-31 NOTE — PROGRESS NOTES
Delgado was removed at this time. Prior to discharge. Patient educated on discharge instructions as well as new medications use, dosage, administration and possible side effects. Patient verified knowledge. IV removed without difficulty and dry dressing in place. Telemetry monitor removed and returned to [facility]. Pt left facility in stable condition to Home with all of their personal belongings. Patient left facility in stable condition.
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
## 2021-12-31 NOTE — PROGRESS NOTES
Pt awake in  Bed. Assessment completed and medications given. VS stable. Pt denies any further needs at this time. Call light in reach and bed in lowest position.

## 2021-12-31 NOTE — FLOWSHEET NOTE
12/31/21 0159   Vital Signs   Temp 96.7 °F (35.9 °C)   Temp Source Oral   Pulse 69   Heart Rate Source Monitor   Resp 18   /65   BP Location Left upper arm   Level of Consciousness Alert (0)   MEWS Score 1     Pt awake in bed. VS as shown above. Pt denies any further needs at this time. Call light in reach and bed in lowest position.

## 2021-12-31 NOTE — CARE COORDINATION
DISCHARGE ORDER  Date/Time 2021 11:53 AM  Completed by: Karri Morgan RN, Case Management    Patient Name: Terri Greene      : 1965  Admitting Diagnosis: Acute on chronic diastolic CHF (congestive heart failure) ([facility]) [I50.33]  Acute on chronic congestive heart failure, unspecified heart failure type ([address] [I50.9]      Admit order Date and Status:2021  (verify MD's last order for status of admission)      Noted discharge order. If applicable PT/OT recommendation at Discharge: NA  DME recommendation by PT/OT:NA  Confirmed discharge plan: Yes  with whom__pt_____________  If pt confirmed DC plan does family need to be contacted by CM No if yes who______  Discharge Plan: Chart reviewed. Pt is returning home with mother and dtr, his caregivers with [facility] with [facility] for SN and will add HHA. Pt aware of  of 1300 with [facility] his preferred transport company. TC to [facility] and spoke with Colette Junior and made her aware pt will discharge home today, all needed documentation placed in chart and [place] to pull through Epic. No further dc needs voiced or identified. Reviewed chart. Role of discharge planner explained and patient verbalized understanding. Discharge order is noted. Has Home O2 in place on admit:  No    Pt is being d/c'd to home today. Pt's O2 sats are 92% on RA. Discharge timeout done with Rober Bhatia. All discharge needs and concerns addressed.

## 2022-01-01 ENCOUNTER — APPOINTMENT (OUTPATIENT)
Dept: GENERAL RADIOLOGY | Age: 57
DRG: 004 | End: 2022-01-01
Payer: MEDICARE

## 2022-01-01 ENCOUNTER — TELEPHONE (OUTPATIENT)
Dept: PULMONOLOGY | Age: 57
End: 2022-01-01

## 2022-01-01 ENCOUNTER — HOSPITAL ENCOUNTER (OUTPATIENT)
Dept: NON INVASIVE DIAGNOSTICS | Age: 57
Discharge: HOME | End: 2022-04-11
Payer: MEDICARE

## 2022-01-01 ENCOUNTER — APPOINTMENT (OUTPATIENT)
Dept: ULTRASOUND IMAGING | Age: 57
DRG: 004 | End: 2022-01-01
Payer: MEDICARE

## 2022-01-01 ENCOUNTER — APPOINTMENT (OUTPATIENT)
Dept: VASCULAR LAB | Age: 57
DRG: 004 | End: 2022-01-01
Payer: MEDICARE

## 2022-01-01 ENCOUNTER — CARE COORDINATION (OUTPATIENT)
Dept: CASE MANAGEMENT | Age: 57
End: 2022-01-01

## 2022-01-01 ENCOUNTER — HOSPITAL ENCOUNTER (INPATIENT)
Age: 57
LOS: 16 days | Discharge: NURSING, REHAB OR LONG-TERM CARE | DRG: 291 | End: 2022-02-15
Attending: STUDENT IN AN ORGANIZED HEALTH CARE EDUCATION/TRAINING PROGRAM | Admitting: INTERNAL MEDICINE
Payer: MEDICARE

## 2022-01-01 ENCOUNTER — TELEPHONE (OUTPATIENT)
Dept: OTHER | Facility: CLINIC | Age: 57
End: 2022-01-01

## 2022-01-01 ENCOUNTER — CARE COORDINATION (OUTPATIENT)
Dept: CARE COORDINATION | Age: 57
End: 2022-01-01

## 2022-01-01 ENCOUNTER — HOSPITAL ENCOUNTER (OUTPATIENT)
Dept: WOUND CARE | Age: 57
Discharge: HOME | End: 2022-01-21
Payer: MEDICARE

## 2022-01-01 ENCOUNTER — TELEPHONE (OUTPATIENT)
Dept: CARDIOLOGY CLINIC | Age: 57
End: 2022-01-01

## 2022-01-01 ENCOUNTER — APPOINTMENT (OUTPATIENT)
Dept: CT IMAGING | Age: 57
DRG: 004 | End: 2022-01-01
Payer: MEDICARE

## 2022-01-01 ENCOUNTER — OFFICE VISIT (OUTPATIENT)
Dept: CARDIOLOGY CLINIC | Age: 57
End: 2022-01-01
Payer: MEDICARE

## 2022-01-01 ENCOUNTER — APPOINTMENT (OUTPATIENT)
Dept: INTERVENTIONAL RADIOLOGY/VASCULAR | Age: 57
DRG: 291 | End: 2022-01-01
Payer: MEDICARE

## 2022-01-01 ENCOUNTER — HOSPITAL ENCOUNTER (INPATIENT)
Age: 57
LOS: 31 days | DRG: 004 | End: 2022-05-22
Attending: EMERGENCY MEDICINE | Admitting: INTERNAL MEDICINE
Payer: MEDICARE

## 2022-01-01 ENCOUNTER — HOSPITAL ENCOUNTER (OUTPATIENT)
Dept: WOUND CARE | Age: 57
Discharge: HOME | End: 2022-03-17
Payer: MEDICARE

## 2022-01-01 ENCOUNTER — VIRTUAL VISIT (OUTPATIENT)
Dept: INTERNAL MEDICINE CLINIC | Age: 57
End: 2022-01-01

## 2022-01-01 ENCOUNTER — NURSE ONLY (OUTPATIENT)
Dept: CARDIOLOGY CLINIC | Age: 57
End: 2022-01-01

## 2022-01-01 ENCOUNTER — HOSPITAL ENCOUNTER (OUTPATIENT)
Dept: WOUND CARE | Age: 57
Discharge: HOME | End: 2022-02-18
Payer: MEDICARE

## 2022-01-01 ENCOUNTER — HOSPITAL ENCOUNTER (OUTPATIENT)
Dept: WOUND CARE | Age: 57
Discharge: HOME | DRG: 004 | End: 2022-04-20
Payer: MEDICARE

## 2022-01-01 ENCOUNTER — HOSPITAL ENCOUNTER (OUTPATIENT)
Dept: WOUND CARE | Age: 57
Discharge: HOME | End: 2022-04-01
Payer: MEDICARE

## 2022-01-01 ENCOUNTER — TELEMEDICINE (OUTPATIENT)
Dept: PULMONOLOGY | Age: 57
End: 2022-01-01
Payer: MEDICARE

## 2022-01-01 ENCOUNTER — ANESTHESIA (OUTPATIENT)
Dept: OPERATING ROOM | Age: 57
DRG: 004 | End: 2022-01-01
Payer: MEDICARE

## 2022-01-01 ENCOUNTER — APPOINTMENT (OUTPATIENT)
Dept: INTERVENTIONAL RADIOLOGY/VASCULAR | Age: 57
DRG: 004 | End: 2022-01-01
Payer: MEDICARE

## 2022-01-01 ENCOUNTER — TELEMEDICINE (OUTPATIENT)
Dept: INTERNAL MEDICINE CLINIC | Age: 57
End: 2022-01-01

## 2022-01-01 ENCOUNTER — HOSPITAL ENCOUNTER (OUTPATIENT)
Dept: WOUND CARE | Age: 57
Discharge: HOME | End: 2022-04-15

## 2022-01-01 ENCOUNTER — APPOINTMENT (OUTPATIENT)
Dept: GENERAL RADIOLOGY | Age: 57
DRG: 291 | End: 2022-01-01
Payer: MEDICARE

## 2022-01-01 ENCOUNTER — ANESTHESIA EVENT (OUTPATIENT)
Dept: OPERATING ROOM | Age: 57
DRG: 004 | End: 2022-01-01
Payer: MEDICARE

## 2022-01-01 ENCOUNTER — HOSPITAL ENCOUNTER (OUTPATIENT)
Dept: NON INVASIVE DIAGNOSTICS | Age: 57
Discharge: HOME | End: 2022-01-10
Payer: MEDICARE

## 2022-01-01 ENCOUNTER — HOSPITAL ENCOUNTER (OUTPATIENT)
Dept: WOUND CARE | Age: 57
Discharge: HOME | End: 2022-03-04
Payer: MEDICARE

## 2022-01-01 ENCOUNTER — TELEPHONE (OUTPATIENT)
Dept: INTERNAL MEDICINE CLINIC | Age: 57
End: 2022-01-01

## 2022-01-01 ENCOUNTER — HOSPITAL ENCOUNTER (OUTPATIENT)
Dept: WOUND CARE | Age: 57
Discharge: HOME | End: 2022-01-07
Payer: MEDICARE

## 2022-01-01 ENCOUNTER — HOSPITAL ENCOUNTER (OUTPATIENT)
Dept: WOUND CARE | Age: 57
Discharge: HOME | DRG: 291 | End: 2022-02-04
Payer: MEDICARE

## 2022-01-01 VITALS
HEIGHT: 74 IN | BODY MASS INDEX: 35.94 KG/M2 | HEART RATE: 78 BPM | OXYGEN SATURATION: 97 % | TEMPERATURE: 98.3 F | DIASTOLIC BLOOD PRESSURE: 78 MMHG | SYSTOLIC BLOOD PRESSURE: 96 MMHG | WEIGHT: 280 LBS

## 2022-01-01 VITALS
HEIGHT: 74 IN | DIASTOLIC BLOOD PRESSURE: 84 MMHG | OXYGEN SATURATION: 99 % | BODY MASS INDEX: 26.32 KG/M2 | SYSTOLIC BLOOD PRESSURE: 132 MMHG | RESPIRATION RATE: 18 BRPM | HEART RATE: 85 BPM | TEMPERATURE: 97.6 F

## 2022-01-01 VITALS
SYSTOLIC BLOOD PRESSURE: 124 MMHG | WEIGHT: 205 LBS | HEART RATE: 89 BPM | RESPIRATION RATE: 18 BRPM | OXYGEN SATURATION: 100 % | HEIGHT: 74 IN | DIASTOLIC BLOOD PRESSURE: 83 MMHG | BODY MASS INDEX: 26.31 KG/M2 | TEMPERATURE: 97 F

## 2022-01-01 VITALS
BODY MASS INDEX: 26.31 KG/M2 | RESPIRATION RATE: 18 BRPM | HEIGHT: 74 IN | SYSTOLIC BLOOD PRESSURE: 121 MMHG | TEMPERATURE: 97.7 F | HEART RATE: 95 BPM | OXYGEN SATURATION: 97 % | WEIGHT: 205 LBS | DIASTOLIC BLOOD PRESSURE: 67 MMHG

## 2022-01-01 VITALS
SYSTOLIC BLOOD PRESSURE: 89 MMHG | BODY MASS INDEX: 32.08 KG/M2 | HEART RATE: 92 BPM | OXYGEN SATURATION: 99 % | TEMPERATURE: 97.6 F | RESPIRATION RATE: 22 BRPM | HEIGHT: 74 IN | DIASTOLIC BLOOD PRESSURE: 61 MMHG | WEIGHT: 250 LBS

## 2022-01-01 VITALS
TEMPERATURE: 97.5 F | HEART RATE: 90 BPM | HEIGHT: 74 IN | WEIGHT: 205 LBS | BODY MASS INDEX: 26.31 KG/M2 | DIASTOLIC BLOOD PRESSURE: 76 MMHG | RESPIRATION RATE: 18 BRPM | SYSTOLIC BLOOD PRESSURE: 147 MMHG | OXYGEN SATURATION: 99 %

## 2022-01-01 VITALS
WEIGHT: 250 LBS | DIASTOLIC BLOOD PRESSURE: 74 MMHG | HEART RATE: 96 BPM | SYSTOLIC BLOOD PRESSURE: 118 MMHG | HEIGHT: 74 IN | BODY MASS INDEX: 32.08 KG/M2 | OXYGEN SATURATION: 96 %

## 2022-01-01 VITALS
RESPIRATION RATE: 18 BRPM | HEART RATE: 80 BPM | DIASTOLIC BLOOD PRESSURE: 75 MMHG | HEIGHT: 74 IN | SYSTOLIC BLOOD PRESSURE: 119 MMHG | TEMPERATURE: 97 F | BODY MASS INDEX: 37.25 KG/M2

## 2022-01-01 VITALS
BODY MASS INDEX: 28.75 KG/M2 | DIASTOLIC BLOOD PRESSURE: 74 MMHG | OXYGEN SATURATION: 100 % | SYSTOLIC BLOOD PRESSURE: 143 MMHG | TEMPERATURE: 98.3 F | WEIGHT: 224 LBS | RESPIRATION RATE: 17 BRPM | HEART RATE: 123 BPM | HEIGHT: 74 IN

## 2022-01-01 VITALS
HEIGHT: 74 IN | DIASTOLIC BLOOD PRESSURE: 75 MMHG | BODY MASS INDEX: 37.25 KG/M2 | RESPIRATION RATE: 20 BRPM | HEART RATE: 88 BPM | TEMPERATURE: 97.6 F | SYSTOLIC BLOOD PRESSURE: 127 MMHG

## 2022-01-01 VITALS
HEIGHT: 74 IN | TEMPERATURE: 97.9 F | BODY MASS INDEX: 35.94 KG/M2 | DIASTOLIC BLOOD PRESSURE: 68 MMHG | WEIGHT: 280 LBS | OXYGEN SATURATION: 95 % | SYSTOLIC BLOOD PRESSURE: 100 MMHG | HEART RATE: 71 BPM

## 2022-01-01 VITALS
HEIGHT: 74 IN | BODY MASS INDEX: 29.48 KG/M2 | OXYGEN SATURATION: 96 % | DIASTOLIC BLOOD PRESSURE: 70 MMHG | SYSTOLIC BLOOD PRESSURE: 115 MMHG | TEMPERATURE: 97.2 F | RESPIRATION RATE: 18 BRPM | HEART RATE: 81 BPM | WEIGHT: 229.72 LBS

## 2022-01-01 DIAGNOSIS — E87.5 HYPERKALEMIA: ICD-10-CM

## 2022-01-01 DIAGNOSIS — E11.51 TYPE 2 DIABETES MELLITUS WITH DIABETIC PERIPHERAL ANGIOPATHY WITHOUT GANGRENE, WITH LONG-TERM CURRENT USE OF INSULIN (HCC): Primary | ICD-10-CM

## 2022-01-01 DIAGNOSIS — Z79.4 TYPE 2 DIABETES MELLITUS WITH DIABETIC PERIPHERAL ANGIOPATHY WITHOUT GANGRENE, WITH LONG-TERM CURRENT USE OF INSULIN (HCC): Primary | ICD-10-CM

## 2022-01-01 DIAGNOSIS — N30.01 ACUTE CYSTITIS WITH HEMATURIA: ICD-10-CM

## 2022-01-01 DIAGNOSIS — L92.9 HYPERGRANULATION: ICD-10-CM

## 2022-01-01 DIAGNOSIS — R60.1 ANASARCA: ICD-10-CM

## 2022-01-01 DIAGNOSIS — J98.11 PULMONARY ATELECTASIS: ICD-10-CM

## 2022-01-01 DIAGNOSIS — L89.894 PRESSURE ULCER OF LEFT LEG, STAGE 4 (HCC): ICD-10-CM

## 2022-01-01 DIAGNOSIS — N04.9 ANASARCA ASSOCIATED WITH DISORDER OF KIDNEY: ICD-10-CM

## 2022-01-01 DIAGNOSIS — N18.31 CHRONIC KIDNEY DISEASE, STAGE 3A (HCC): ICD-10-CM

## 2022-01-01 DIAGNOSIS — M62.838 MUSCLE SPASM: ICD-10-CM

## 2022-01-01 DIAGNOSIS — Z93.3 COLOSTOMY IN PLACE (HCC): ICD-10-CM

## 2022-01-01 DIAGNOSIS — N17.9 AKI (ACUTE KIDNEY INJURY) (HCC): ICD-10-CM

## 2022-01-01 DIAGNOSIS — T81.31XA POSTOPERATIVE WOUND DEHISCENCE, INITIAL ENCOUNTER: ICD-10-CM

## 2022-01-01 DIAGNOSIS — Z79.4 TYPE 2 DIABETES MELLITUS WITH OTHER SKIN ULCER, WITH LONG-TERM CURRENT USE OF INSULIN (HCC): ICD-10-CM

## 2022-01-01 DIAGNOSIS — R06.02 SOB (SHORTNESS OF BREATH): ICD-10-CM

## 2022-01-01 DIAGNOSIS — E11.622 TYPE 2 DIABETES MELLITUS WITH OTHER SKIN ULCER, WITH LONG-TERM CURRENT USE OF INSULIN (HCC): ICD-10-CM

## 2022-01-01 DIAGNOSIS — I25.10 CORONARY ARTERY DISEASE INVOLVING NATIVE CORONARY ARTERY OF NATIVE HEART WITHOUT ANGINA PECTORIS: ICD-10-CM

## 2022-01-01 DIAGNOSIS — I25.5 ISCHEMIC CARDIOMYOPATHY: Chronic | ICD-10-CM

## 2022-01-01 DIAGNOSIS — R65.21 SEPTIC SHOCK (HCC): Primary | ICD-10-CM

## 2022-01-01 DIAGNOSIS — I25.5 ISCHEMIC CARDIOMYOPATHY: ICD-10-CM

## 2022-01-01 DIAGNOSIS — T81.31XA POSTOPERATIVE WOUND DEHISCENCE, INITIAL ENCOUNTER: Primary | ICD-10-CM

## 2022-01-01 DIAGNOSIS — K21.9 GASTROESOPHAGEAL REFLUX DISEASE WITHOUT ESOPHAGITIS: ICD-10-CM

## 2022-01-01 DIAGNOSIS — I25.5 ISCHEMIC CARDIOMYOPATHY: Primary | Chronic | ICD-10-CM

## 2022-01-01 DIAGNOSIS — R78.81 ENTEROCOCCAL BACTEREMIA: ICD-10-CM

## 2022-01-01 DIAGNOSIS — I48.0 PAROXYSMAL ATRIAL FIBRILLATION (HCC): ICD-10-CM

## 2022-01-01 DIAGNOSIS — J44.9 CHRONIC OBSTRUCTIVE PULMONARY DISEASE, UNSPECIFIED COPD TYPE (HCC): Primary | ICD-10-CM

## 2022-01-01 DIAGNOSIS — E66.01 SEVERE OBESITY (BMI 35.0-35.9 WITH COMORBIDITY) (HCC): ICD-10-CM

## 2022-01-01 DIAGNOSIS — B95.2 ENTEROCOCCAL BACTEREMIA: ICD-10-CM

## 2022-01-01 DIAGNOSIS — Z99.2 ESRD (END STAGE RENAL DISEASE) ON DIALYSIS (HCC): ICD-10-CM

## 2022-01-01 DIAGNOSIS — R60.1 GENERALIZED EDEMA: ICD-10-CM

## 2022-01-01 DIAGNOSIS — Z89.511 S/P BKA (BELOW KNEE AMPUTATION) UNILATERAL, RIGHT (HCC): ICD-10-CM

## 2022-01-01 DIAGNOSIS — E16.2 HYPOGLYCEMIA: ICD-10-CM

## 2022-01-01 DIAGNOSIS — Z79.899 MEDICATION MANAGEMENT: ICD-10-CM

## 2022-01-01 DIAGNOSIS — L89.894 PRESSURE ULCER OF LEFT LEG, STAGE 4 (HCC): Primary | ICD-10-CM

## 2022-01-01 DIAGNOSIS — T81.31XA DEHISCENCE OF SURGICAL WOUND, INITIAL ENCOUNTER: ICD-10-CM

## 2022-01-01 DIAGNOSIS — I50.23 ACUTE ON CHRONIC SYSTOLIC CHF (CONGESTIVE HEART FAILURE) (HCC): Primary | ICD-10-CM

## 2022-01-01 DIAGNOSIS — I50.43 ACUTE ON CHRONIC COMBINED SYSTOLIC AND DIASTOLIC CHF (CONGESTIVE HEART FAILURE) (HCC): ICD-10-CM

## 2022-01-01 DIAGNOSIS — T81.31XD POSTOPERATIVE WOUND DEHISCENCE, SUBSEQUENT ENCOUNTER: Primary | ICD-10-CM

## 2022-01-01 DIAGNOSIS — E11.51 TYPE 2 DIABETES MELLITUS WITH DIABETIC PERIPHERAL ANGIOPATHY WITHOUT GANGRENE, WITH LONG-TERM CURRENT USE OF INSULIN (HCC): ICD-10-CM

## 2022-01-01 DIAGNOSIS — A41.9 SEPTIC SHOCK (HCC): Primary | ICD-10-CM

## 2022-01-01 DIAGNOSIS — N18.6 ESRD (END STAGE RENAL DISEASE) ON DIALYSIS (HCC): ICD-10-CM

## 2022-01-01 DIAGNOSIS — R93.89 ABNORMAL CXR: Primary | ICD-10-CM

## 2022-01-01 DIAGNOSIS — Z79.4 TYPE 2 DIABETES MELLITUS WITH DIABETIC PERIPHERAL ANGIOPATHY WITHOUT GANGRENE, WITH LONG-TERM CURRENT USE OF INSULIN (HCC): ICD-10-CM

## 2022-01-01 DIAGNOSIS — E78.2 MIXED HYPERLIPIDEMIA: Primary | Chronic | ICD-10-CM

## 2022-01-01 DIAGNOSIS — R34 DECREASED URINE OUTPUT: ICD-10-CM

## 2022-01-01 DIAGNOSIS — G82.21 PARAPLEGIA, COMPLETE (HCC): ICD-10-CM

## 2022-01-01 DIAGNOSIS — I50.22 SYSTOLIC CHF, CHRONIC (HCC): ICD-10-CM

## 2022-01-01 DIAGNOSIS — Z99.2 ESRD ON DIALYSIS (HCC): ICD-10-CM

## 2022-01-01 DIAGNOSIS — N18.9 ACUTE ON CHRONIC RENAL INSUFFICIENCY: Primary | ICD-10-CM

## 2022-01-01 DIAGNOSIS — N18.6 ESRD ON DIALYSIS (HCC): ICD-10-CM

## 2022-01-01 DIAGNOSIS — J18.9 PNEUMONIA DUE TO INFECTIOUS ORGANISM, UNSPECIFIED LATERALITY, UNSPECIFIED PART OF LUNG: ICD-10-CM

## 2022-01-01 DIAGNOSIS — I50.43 ACUTE ON CHRONIC COMBINED SYSTOLIC AND DIASTOLIC CHF (CONGESTIVE HEART FAILURE) (HCC): Primary | ICD-10-CM

## 2022-01-01 DIAGNOSIS — R05.3 CHRONIC COUGH: ICD-10-CM

## 2022-01-01 DIAGNOSIS — N28.9 ACUTE ON CHRONIC RENAL INSUFFICIENCY: Primary | ICD-10-CM

## 2022-01-01 LAB
A/G RATIO: 0.9 (ref 1.1–2.2)
A/G RATIO: 1 (ref 1.1–2.2)
A/G RATIO: 1 (ref 1.1–2.2)
ALBUMIN SERPL-MCNC: 2 G/DL (ref 3.4–5)
ALBUMIN SERPL-MCNC: 2 G/DL (ref 3.4–5)
ALBUMIN SERPL-MCNC: 2.1 G/DL (ref 3.4–5)
ALBUMIN SERPL-MCNC: 2.2 G/DL (ref 3.4–5)
ALBUMIN SERPL-MCNC: 2.3 G/DL (ref 3.4–5)
ALBUMIN SERPL-MCNC: 2.4 G/DL (ref 3.4–5)
ALBUMIN SERPL-MCNC: 2.5 G/DL (ref 3.4–5)
ALBUMIN SERPL-MCNC: 2.6 G/DL (ref 3.4–5)
ALBUMIN SERPL-MCNC: 2.7 G/DL (ref 3.4–5)
ALBUMIN SERPL-MCNC: 2.8 G/DL (ref 3.4–5)
ALBUMIN SERPL-MCNC: 2.9 G/DL (ref 3.4–5)
ALBUMIN SERPL-MCNC: 3 G/DL (ref 3.4–5)
ALBUMIN SERPL-MCNC: 3.1 G/DL (ref 3.4–5)
ALBUMIN SERPL-MCNC: 3.2 G/DL (ref 3.4–5)
ALBUMIN SERPL-MCNC: 3.3 G/DL (ref 3.4–5)
ALBUMIN SERPL-MCNC: 3.4 G/DL (ref 3.4–5)
ALBUMIN SERPL-MCNC: 3.5 G/DL (ref 3.4–5)
ALBUMIN SERPL-MCNC: 3.6 G/DL (ref 3.4–5)
ALBUMIN SERPL-MCNC: 3.7 G/DL (ref 3.4–5)
ALBUMIN SERPL-MCNC: 3.9 G/DL (ref 3.4–5)
ALBUMIN SERPL-MCNC: 3.9 G/DL (ref 3.4–5)
ALBUMIN SERPL-MCNC: 4.1 G/DL (ref 3.4–5)
ALP BLD-CCNC: 114 U/L (ref 40–129)
ALP BLD-CCNC: 124 U/L (ref 40–129)
ALP BLD-CCNC: 214 U/L (ref 40–129)
ALP BLD-CCNC: 249 U/L (ref 40–129)
ALT SERPL-CCNC: 11 U/L (ref 10–40)
ALT SERPL-CCNC: 12 U/L (ref 10–40)
ALT SERPL-CCNC: 6 U/L (ref 10–40)
ALT SERPL-CCNC: 6 U/L (ref 10–40)
ANION GAP SERPL CALCULATED.3IONS-SCNC: 10 MMOL/L (ref 3–16)
ANION GAP SERPL CALCULATED.3IONS-SCNC: 11 MMOL/L (ref 3–16)
ANION GAP SERPL CALCULATED.3IONS-SCNC: 12 MMOL/L (ref 3–16)
ANION GAP SERPL CALCULATED.3IONS-SCNC: 13 MMOL/L (ref 3–16)
ANION GAP SERPL CALCULATED.3IONS-SCNC: 14 MMOL/L (ref 3–16)
ANION GAP SERPL CALCULATED.3IONS-SCNC: 15 MMOL/L (ref 3–16)
ANION GAP SERPL CALCULATED.3IONS-SCNC: 16 MMOL/L (ref 3–16)
ANION GAP SERPL CALCULATED.3IONS-SCNC: 18 MMOL/L (ref 3–16)
ANION GAP SERPL CALCULATED.3IONS-SCNC: 20 MMOL/L (ref 3–16)
ANION GAP SERPL CALCULATED.3IONS-SCNC: 20 MMOL/L (ref 3–16)
ANION GAP SERPL CALCULATED.3IONS-SCNC: 5 MMOL/L (ref 3–16)
ANION GAP SERPL CALCULATED.3IONS-SCNC: 6 MMOL/L (ref 3–16)
ANION GAP SERPL CALCULATED.3IONS-SCNC: 7 MMOL/L (ref 3–16)
ANION GAP SERPL CALCULATED.3IONS-SCNC: 8 MMOL/L (ref 3–16)
ANION GAP SERPL CALCULATED.3IONS-SCNC: 9 MMOL/L (ref 3–16)
ANISOCYTOSIS: ABNORMAL
ANTI-XA UNFRAC HEPARIN: 0.04 IU/ML (ref 0.3–0.7)
ANTI-XA UNFRAC HEPARIN: 0.06 IU/ML (ref 0.3–0.7)
ANTI-XA UNFRAC HEPARIN: 0.13 IU/ML (ref 0.3–0.7)
ANTI-XA UNFRAC HEPARIN: 0.14 IU/ML (ref 0.3–0.7)
ANTI-XA UNFRAC HEPARIN: 0.17 IU/ML (ref 0.3–0.7)
ANTI-XA UNFRAC HEPARIN: 0.26 IU/ML (ref 0.3–0.7)
ANTI-XA UNFRAC HEPARIN: 0.33 IU/ML (ref 0.3–0.7)
ANTI-XA UNFRAC HEPARIN: 0.34 IU/ML (ref 0.3–0.7)
ANTI-XA UNFRAC HEPARIN: <0.04 IU/ML (ref 0.3–0.7)
APPEARANCE FLUID: CLEAR
APTT: 100.2 SEC (ref 26.2–38.6)
APTT: 100.5 SEC (ref 26.2–38.6)
APTT: 101.1 SEC (ref 26.2–38.6)
APTT: 106 SEC (ref 26.2–38.6)
APTT: 110 SEC (ref 26.2–38.6)
APTT: 124.9 SEC (ref 26.2–38.6)
APTT: 136 SEC (ref 26.2–38.6)
APTT: 41.8 SEC (ref 26.2–38.6)
APTT: 47.2 SEC (ref 26.2–38.6)
APTT: 52.3 SEC (ref 26.2–38.6)
APTT: 61.5 SEC (ref 26.2–38.6)
APTT: 63.8 SEC (ref 26.2–38.6)
APTT: 67.9 SEC (ref 26.2–38.6)
APTT: 79.2 SEC (ref 26.2–38.6)
APTT: 82.9 SEC (ref 26.2–38.6)
APTT: 83.3 SEC (ref 26.2–38.6)
APTT: 83.8 SEC (ref 26.2–38.6)
APTT: 90.7 SEC (ref 26.2–38.6)
APTT: 91.3 SEC (ref 26.2–38.6)
APTT: 93.1 SEC (ref 26.2–38.6)
APTT: 94.4 SEC (ref 26.2–38.6)
APTT: 95.9 SEC (ref 26.2–38.6)
APTT: 98.4 SEC (ref 26.2–38.6)
APTT: 98.6 SEC (ref 26.2–38.6)
APTT: >248 SEC (ref 26.2–38.6)
AST SERPL-CCNC: 12 U/L (ref 15–37)
AST SERPL-CCNC: 14 U/L (ref 15–37)
AST SERPL-CCNC: 19 U/L (ref 15–37)
AST SERPL-CCNC: 23 U/L (ref 15–37)
ATYPICAL LYMPHOCYTE RELATIVE PERCENT: 1 % (ref 0–6)
BACTERIA: ABNORMAL /HPF
BACTERIA: ABNORMAL /HPF
BANDED NEUTROPHILS RELATIVE PERCENT: 1 % (ref 0–7)
BANDED NEUTROPHILS RELATIVE PERCENT: 1 % (ref 0–7)
BANDED NEUTROPHILS RELATIVE PERCENT: 2 % (ref 0–7)
BANDED NEUTROPHILS RELATIVE PERCENT: 4 % (ref 0–7)
BANDED NEUTROPHILS RELATIVE PERCENT: 8 % (ref 0–7)
BASE EXCESS ARTERIAL: -0.5 MMOL/L (ref -3–3)
BASE EXCESS ARTERIAL: -0.5 MMOL/L (ref -3–3)
BASE EXCESS ARTERIAL: -0.7 MMOL/L (ref -3–3)
BASE EXCESS ARTERIAL: -0.8 MMOL/L (ref -3–3)
BASE EXCESS ARTERIAL: -1.2 MMOL/L (ref -3–3)
BASE EXCESS ARTERIAL: -1.2 MMOL/L (ref -3–3)
BASE EXCESS ARTERIAL: -1.3 MMOL/L (ref -3–3)
BASE EXCESS ARTERIAL: -1.3 MMOL/L (ref -3–3)
BASE EXCESS ARTERIAL: -1.5 MMOL/L (ref -3–3)
BASE EXCESS ARTERIAL: -1.9 MMOL/L (ref -3–3)
BASE EXCESS ARTERIAL: -2 MMOL/L (ref -3–3)
BASE EXCESS ARTERIAL: -2 MMOL/L (ref -3–3)
BASE EXCESS ARTERIAL: -2.6 MMOL/L (ref -3–3)
BASE EXCESS ARTERIAL: -3.1 MMOL/L (ref -3–3)
BASE EXCESS ARTERIAL: -3.1 MMOL/L (ref -3–3)
BASE EXCESS ARTERIAL: -3.2 MMOL/L (ref -3–3)
BASE EXCESS ARTERIAL: -3.4 MMOL/L (ref -3–3)
BASE EXCESS ARTERIAL: -3.4 MMOL/L (ref -3–3)
BASE EXCESS ARTERIAL: -4.3 MMOL/L (ref -3–3)
BASE EXCESS ARTERIAL: -4.5 MMOL/L (ref -3–3)
BASE EXCESS ARTERIAL: -4.6 MMOL/L (ref -3–3)
BASE EXCESS ARTERIAL: -5.6 MMOL/L (ref -3–3)
BASE EXCESS ARTERIAL: -6.2 MMOL/L (ref -3–3)
BASE EXCESS ARTERIAL: -7.8 MMOL/L (ref -3–3)
BASE EXCESS ARTERIAL: -9.7 MMOL/L (ref -3–3)
BASE EXCESS ARTERIAL: 0.7 MMOL/L (ref -3–3)
BASE EXCESS ARTERIAL: 0.8 MMOL/L (ref -3–3)
BASE EXCESS ARTERIAL: 1.4 MMOL/L (ref -3–3)
BASE EXCESS VENOUS: -2.4 MMOL/L (ref -3–3)
BASE EXCESS VENOUS: -2.6 MMOL/L (ref -3–3)
BASE EXCESS VENOUS: 0.6 MMOL/L (ref -3–3)
BASOPHILIC STIPPLING: ABNORMAL
BASOPHILIC STIPPLING: ABNORMAL
BASOPHILS ABSOLUTE: 0 K/UL (ref 0–0.2)
BASOPHILS ABSOLUTE: 0.1 K/UL (ref 0–0.2)
BASOPHILS RELATIVE PERCENT: 0 %
BASOPHILS RELATIVE PERCENT: 0.1 %
BASOPHILS RELATIVE PERCENT: 0.2 %
BASOPHILS RELATIVE PERCENT: 0.4 %
BASOPHILS RELATIVE PERCENT: 0.4 %
BASOPHILS RELATIVE PERCENT: 0.5 %
BASOPHILS RELATIVE PERCENT: 0.5 %
BASOPHILS RELATIVE PERCENT: 0.6 %
BASOPHILS RELATIVE PERCENT: 0.7 %
BASOPHILS RELATIVE PERCENT: 0.8 %
BASOPHILS RELATIVE PERCENT: 0.9 %
BASOPHILS RELATIVE PERCENT: 0.9 %
BASOPHILS RELATIVE PERCENT: 1 %
BASOPHILS RELATIVE PERCENT: 1.3 %
BASOPHILS RELATIVE PERCENT: 1.3 %
BASOPHILS RELATIVE PERCENT: 1.4 %
BILIRUB SERPL-MCNC: 0.7 MG/DL (ref 0–1)
BILIRUB SERPL-MCNC: 0.7 MG/DL (ref 0–1)
BILIRUB SERPL-MCNC: 0.8 MG/DL (ref 0–1)
BILIRUB SERPL-MCNC: 0.9 MG/DL (ref 0–1)
BILIRUBIN DIRECT: 0.7 MG/DL (ref 0–0.3)
BILIRUBIN URINE: ABNORMAL
BILIRUBIN URINE: NEGATIVE
BILIRUBIN, INDIRECT: 0.2 MG/DL (ref 0–1)
BLOOD CULTURE, ROUTINE: ABNORMAL
BLOOD CULTURE, ROUTINE: NORMAL
BLOOD CULTURE, ROUTINE: NORMAL
BLOOD, URINE: ABNORMAL
BLOOD, URINE: ABNORMAL
BODY FLUID CULTURE, STERILE: NORMAL
BUN BLDV-MCNC: 10 MG/DL (ref 7–20)
BUN BLDV-MCNC: 100 MG/DL (ref 7–20)
BUN BLDV-MCNC: 101 MG/DL (ref 7–20)
BUN BLDV-MCNC: 11 MG/DL (ref 7–20)
BUN BLDV-MCNC: 12 MG/DL (ref 7–20)
BUN BLDV-MCNC: 13 MG/DL (ref 7–20)
BUN BLDV-MCNC: 14 MG/DL (ref 7–20)
BUN BLDV-MCNC: 15 MG/DL (ref 7–20)
BUN BLDV-MCNC: 15 MG/DL (ref 7–20)
BUN BLDV-MCNC: 16 MG/DL (ref 7–20)
BUN BLDV-MCNC: 17 MG/DL (ref 7–20)
BUN BLDV-MCNC: 18 MG/DL (ref 7–20)
BUN BLDV-MCNC: 19 MG/DL (ref 7–20)
BUN BLDV-MCNC: 20 MG/DL (ref 7–20)
BUN BLDV-MCNC: 21 MG/DL (ref 7–20)
BUN BLDV-MCNC: 22 MG/DL (ref 7–20)
BUN BLDV-MCNC: 22 MG/DL (ref 7–20)
BUN BLDV-MCNC: 23 MG/DL (ref 7–20)
BUN BLDV-MCNC: 24 MG/DL (ref 7–20)
BUN BLDV-MCNC: 25 MG/DL (ref 7–20)
BUN BLDV-MCNC: 26 MG/DL (ref 7–20)
BUN BLDV-MCNC: 28 MG/DL (ref 7–20)
BUN BLDV-MCNC: 28 MG/DL (ref 7–20)
BUN BLDV-MCNC: 30 MG/DL (ref 7–20)
BUN BLDV-MCNC: 30 MG/DL (ref 7–20)
BUN BLDV-MCNC: 31 MG/DL (ref 7–20)
BUN BLDV-MCNC: 32 MG/DL (ref 7–20)
BUN BLDV-MCNC: 33 MG/DL (ref 7–20)
BUN BLDV-MCNC: 38 MG/DL (ref 7–20)
BUN BLDV-MCNC: 45 MG/DL (ref 7–20)
BUN BLDV-MCNC: 61 MG/DL (ref 7–20)
BUN BLDV-MCNC: 66 MG/DL (ref 7–20)
BUN BLDV-MCNC: 67 MG/DL (ref 7–20)
BUN BLDV-MCNC: 7 MG/DL (ref 7–20)
BUN BLDV-MCNC: 78 MG/DL (ref 7–20)
BUN BLDV-MCNC: 79 MG/DL (ref 7–20)
BUN BLDV-MCNC: 8 MG/DL (ref 7–20)
BUN BLDV-MCNC: 80 MG/DL (ref 7–20)
BUN BLDV-MCNC: 81 MG/DL (ref 7–20)
BUN BLDV-MCNC: 81 MG/DL (ref 7–20)
BUN BLDV-MCNC: 82 MG/DL (ref 7–20)
BUN BLDV-MCNC: 84 MG/DL (ref 7–20)
BUN BLDV-MCNC: 87 MG/DL (ref 7–20)
BUN BLDV-MCNC: 88 MG/DL (ref 7–20)
BUN BLDV-MCNC: 88 MG/DL (ref 7–20)
BUN BLDV-MCNC: 9 MG/DL (ref 7–20)
BUN BLDV-MCNC: 90 MG/DL (ref 7–20)
BUN BLDV-MCNC: 95 MG/DL (ref 7–20)
CALCIUM IONIZED: 1.03 MMOL/L (ref 1.12–1.32)
CALCIUM IONIZED: 1.04 MMOL/L (ref 1.12–1.32)
CALCIUM IONIZED: 1.04 MMOL/L (ref 1.12–1.32)
CALCIUM IONIZED: 1.05 MMOL/L (ref 1.12–1.32)
CALCIUM IONIZED: 1.05 MMOL/L (ref 1.12–1.32)
CALCIUM IONIZED: 1.06 MMOL/L (ref 1.12–1.32)
CALCIUM IONIZED: 1.06 MMOL/L (ref 1.12–1.32)
CALCIUM IONIZED: 1.07 MMOL/L (ref 1.12–1.32)
CALCIUM IONIZED: 1.08 MMOL/L (ref 1.12–1.32)
CALCIUM IONIZED: 1.09 MMOL/L (ref 1.12–1.32)
CALCIUM IONIZED: 1.1 MMOL/L (ref 1.12–1.32)
CALCIUM IONIZED: 1.11 MMOL/L (ref 1.12–1.32)
CALCIUM IONIZED: 1.12 MMOL/L (ref 1.12–1.32)
CALCIUM IONIZED: 1.13 MMOL/L (ref 1.12–1.32)
CALCIUM IONIZED: 1.14 MMOL/L (ref 1.12–1.32)
CALCIUM IONIZED: 1.15 MMOL/L (ref 1.12–1.32)
CALCIUM IONIZED: 1.16 MMOL/L (ref 1.12–1.32)
CALCIUM IONIZED: 1.17 MMOL/L (ref 1.12–1.32)
CALCIUM IONIZED: 1.18 MMOL/L (ref 1.12–1.32)
CALCIUM IONIZED: 1.19 MMOL/L (ref 1.12–1.32)
CALCIUM SERPL-MCNC: 7.5 MG/DL (ref 8.3–10.6)
CALCIUM SERPL-MCNC: 7.6 MG/DL (ref 8.3–10.6)
CALCIUM SERPL-MCNC: 7.7 MG/DL (ref 8.3–10.6)
CALCIUM SERPL-MCNC: 7.7 MG/DL (ref 8.3–10.6)
CALCIUM SERPL-MCNC: 7.8 MG/DL (ref 8.3–10.6)
CALCIUM SERPL-MCNC: 7.8 MG/DL (ref 8.3–10.6)
CALCIUM SERPL-MCNC: 7.9 MG/DL (ref 8.3–10.6)
CALCIUM SERPL-MCNC: 8 MG/DL (ref 8.3–10.6)
CALCIUM SERPL-MCNC: 8.1 MG/DL (ref 8.3–10.6)
CALCIUM SERPL-MCNC: 8.2 MG/DL (ref 8.3–10.6)
CALCIUM SERPL-MCNC: 8.3 MG/DL (ref 8.3–10.6)
CALCIUM SERPL-MCNC: 8.4 MG/DL (ref 8.3–10.6)
CALCIUM SERPL-MCNC: 8.5 MG/DL (ref 8.3–10.6)
CALCIUM SERPL-MCNC: 8.6 MG/DL (ref 8.3–10.6)
CALCIUM SERPL-MCNC: 8.7 MG/DL (ref 8.3–10.6)
CALCIUM SERPL-MCNC: 8.8 MG/DL (ref 8.3–10.6)
CALCIUM SERPL-MCNC: 8.9 MG/DL (ref 8.3–10.6)
CALCIUM SERPL-MCNC: 9.1 MG/DL (ref 8.3–10.6)
CALCIUM SERPL-MCNC: 9.3 MG/DL (ref 8.3–10.6)
CALCIUM SERPL-MCNC: 9.5 MG/DL (ref 8.3–10.6)
CARBOXYHEMOGLOBIN ARTERIAL: 0.7 % (ref 0–1.5)
CARBOXYHEMOGLOBIN ARTERIAL: 0.8 % (ref 0–1.5)
CARBOXYHEMOGLOBIN ARTERIAL: 0.9 % (ref 0–1.5)
CARBOXYHEMOGLOBIN ARTERIAL: 1 % (ref 0–1.5)
CARBOXYHEMOGLOBIN ARTERIAL: 1.1 % (ref 0–1.5)
CARBOXYHEMOGLOBIN ARTERIAL: 1.1 % (ref 0–1.5)
CARBOXYHEMOGLOBIN ARTERIAL: 1.2 % (ref 0–1.5)
CARBOXYHEMOGLOBIN ARTERIAL: 1.2 % (ref 0–1.5)
CARBOXYHEMOGLOBIN ARTERIAL: 1.3 % (ref 0–1.5)
CARBOXYHEMOGLOBIN ARTERIAL: 1.4 % (ref 0–1.5)
CARBOXYHEMOGLOBIN ARTERIAL: 1.5 % (ref 0–1.5)
CARBOXYHEMOGLOBIN ARTERIAL: 1.5 % (ref 0–1.5)
CARBOXYHEMOGLOBIN ARTERIAL: 1.6 % (ref 0–1.5)
CARBOXYHEMOGLOBIN ARTERIAL: 1.7 % (ref 0–1.5)
CARBOXYHEMOGLOBIN ARTERIAL: 1.7 % (ref 0–1.5)
CARBOXYHEMOGLOBIN: 2.1 % (ref 0–1.5)
CARBOXYHEMOGLOBIN: 2.2 % (ref 0–1.5)
CARBOXYHEMOGLOBIN: 2.4 % (ref 0–1.5)
CELL COUNT FLUID TYPE: NORMAL
CHLORIDE BLD-SCNC: 100 MMOL/L (ref 99–110)
CHLORIDE BLD-SCNC: 100 MMOL/L (ref 99–110)
CHLORIDE BLD-SCNC: 86 MMOL/L (ref 99–110)
CHLORIDE BLD-SCNC: 88 MMOL/L (ref 99–110)
CHLORIDE BLD-SCNC: 89 MMOL/L (ref 99–110)
CHLORIDE BLD-SCNC: 91 MMOL/L (ref 99–110)
CHLORIDE BLD-SCNC: 92 MMOL/L (ref 99–110)
CHLORIDE BLD-SCNC: 93 MMOL/L (ref 99–110)
CHLORIDE BLD-SCNC: 94 MMOL/L (ref 99–110)
CHLORIDE BLD-SCNC: 95 MMOL/L (ref 99–110)
CHLORIDE BLD-SCNC: 96 MMOL/L (ref 99–110)
CHLORIDE BLD-SCNC: 97 MMOL/L (ref 99–110)
CHLORIDE BLD-SCNC: 98 MMOL/L (ref 99–110)
CHLORIDE BLD-SCNC: 99 MMOL/L (ref 99–110)
CHOLESTEROL, TOTAL: 80 MG/DL (ref 0–199)
CLARITY: ABNORMAL
CLARITY: ABNORMAL
CLOT EVALUATION: NORMAL
CO2: 16 MMOL/L (ref 21–32)
CO2: 17 MMOL/L (ref 21–32)
CO2: 17 MMOL/L (ref 21–32)
CO2: 18 MMOL/L (ref 21–32)
CO2: 19 MMOL/L (ref 21–32)
CO2: 19 MMOL/L (ref 21–32)
CO2: 20 MMOL/L (ref 21–32)
CO2: 21 MMOL/L (ref 21–32)
CO2: 22 MMOL/L (ref 21–32)
CO2: 23 MMOL/L (ref 21–32)
CO2: 24 MMOL/L (ref 21–32)
CO2: 25 MMOL/L (ref 21–32)
CO2: 26 MMOL/L (ref 21–32)
CO2: 27 MMOL/L (ref 21–32)
CO2: 28 MMOL/L (ref 21–32)
CO2: 29 MMOL/L (ref 21–32)
CO2: 29 MMOL/L (ref 21–32)
CO2: 30 MMOL/L (ref 21–32)
CO2: 31 MMOL/L (ref 21–32)
CO2: 32 MMOL/L (ref 21–32)
CO2: 32 MMOL/L (ref 21–32)
CO2: 33 MMOL/L (ref 21–32)
CO2: 34 MMOL/L (ref 21–32)
COLOR FLUID: YELLOW
COLOR: ABNORMAL
COLOR: YELLOW
CORTISOL - AM: 17.9 UG/DL (ref 4.3–22.4)
CORTISOL TOTAL: 19.9 UG/DL
CREAT SERPL-MCNC: 0.6 MG/DL (ref 0.9–1.3)
CREAT SERPL-MCNC: 0.7 MG/DL (ref 0.9–1.3)
CREAT SERPL-MCNC: 0.8 MG/DL (ref 0.9–1.3)
CREAT SERPL-MCNC: 0.9 MG/DL (ref 0.9–1.3)
CREAT SERPL-MCNC: 1 MG/DL (ref 0.9–1.3)
CREAT SERPL-MCNC: 1.1 MG/DL (ref 0.9–1.3)
CREAT SERPL-MCNC: 1.2 MG/DL (ref 0.9–1.3)
CREAT SERPL-MCNC: 1.3 MG/DL (ref 0.9–1.3)
CREAT SERPL-MCNC: 1.4 MG/DL (ref 0.9–1.3)
CREAT SERPL-MCNC: 1.8 MG/DL (ref 0.9–1.3)
CREAT SERPL-MCNC: 2 MG/DL (ref 0.9–1.3)
CREAT SERPL-MCNC: 2.1 MG/DL (ref 0.9–1.3)
CREAT SERPL-MCNC: <0.5 MG/DL (ref 0.9–1.3)
CULTURE, BLOOD 2: ABNORMAL
CULTURE, BLOOD 2: ABNORMAL
CULTURE, BLOOD 2: NORMAL
CULTURE, BLOOD 2: NORMAL
CULTURE, RESPIRATORY: ABNORMAL
EKG ATRIAL RATE: 101 BPM
EKG ATRIAL RATE: 105 BPM
EKG ATRIAL RATE: 109 BPM
EKG ATRIAL RATE: 74 BPM
EKG ATRIAL RATE: 96 BPM
EKG ATRIAL RATE: 98 BPM
EKG DIAGNOSIS: NORMAL
EKG P AXIS: 39 DEGREES
EKG P AXIS: 41 DEGREES
EKG P AXIS: 56 DEGREES
EKG P AXIS: 62 DEGREES
EKG P-R INTERVAL: 176 MS
EKG P-R INTERVAL: 198 MS
EKG P-R INTERVAL: 200 MS
EKG P-R INTERVAL: 200 MS
EKG P-R INTERVAL: 208 MS
EKG Q-T INTERVAL: 308 MS
EKG Q-T INTERVAL: 316 MS
EKG Q-T INTERVAL: 354 MS
EKG Q-T INTERVAL: 356 MS
EKG Q-T INTERVAL: 386 MS
EKG Q-T INTERVAL: 410 MS
EKG QRS DURATION: 104 MS
EKG QRS DURATION: 152 MS
EKG QRS DURATION: 84 MS
EKG QRS DURATION: 84 MS
EKG QRS DURATION: 90 MS
EKG QRS DURATION: 96 MS
EKG QTC CALCULATION (BAZETT): 399 MS
EKG QTC CALCULATION (BAZETT): 425 MS
EKG QTC CALCULATION (BAZETT): 451 MS
EKG QTC CALCULATION (BAZETT): 455 MS
EKG QTC CALCULATION (BAZETT): 456 MS
EKG QTC CALCULATION (BAZETT): 510 MS
EKG R AXIS: -16 DEGREES
EKG R AXIS: -23 DEGREES
EKG R AXIS: -24 DEGREES
EKG R AXIS: -34 DEGREES
EKG R AXIS: -4 DEGREES
EKG R AXIS: 237 DEGREES
EKG T AXIS: 106 DEGREES
EKG T AXIS: 262 DEGREES
EKG T AXIS: 46 DEGREES
EKG T AXIS: 53 DEGREES
EKG T AXIS: 79 DEGREES
EKG T AXIS: 92 DEGREES
EKG VENTRICULAR RATE: 101 BPM
EKG VENTRICULAR RATE: 105 BPM
EKG VENTRICULAR RATE: 109 BPM
EKG VENTRICULAR RATE: 74 BPM
EKG VENTRICULAR RATE: 98 BPM
EKG VENTRICULAR RATE: 99 BPM
EOSINOPHILS ABSOLUTE: 0 K/UL (ref 0–0.6)
EOSINOPHILS ABSOLUTE: 0.1 K/UL (ref 0–0.6)
EOSINOPHILS ABSOLUTE: 0.2 K/UL (ref 0–0.6)
EOSINOPHILS ABSOLUTE: 0.4 K/UL (ref 0–0.6)
EOSINOPHILS RELATIVE PERCENT: 0 %
EOSINOPHILS RELATIVE PERCENT: 0.1 %
EOSINOPHILS RELATIVE PERCENT: 0.1 %
EOSINOPHILS RELATIVE PERCENT: 0.3 %
EOSINOPHILS RELATIVE PERCENT: 0.6 %
EOSINOPHILS RELATIVE PERCENT: 0.6 %
EOSINOPHILS RELATIVE PERCENT: 0.7 %
EOSINOPHILS RELATIVE PERCENT: 1 %
EOSINOPHILS RELATIVE PERCENT: 1.2 %
EOSINOPHILS RELATIVE PERCENT: 1.4 %
EOSINOPHILS RELATIVE PERCENT: 1.6 %
EOSINOPHILS RELATIVE PERCENT: 1.7 %
EOSINOPHILS RELATIVE PERCENT: 1.7 %
EOSINOPHILS RELATIVE PERCENT: 1.8 %
EOSINOPHILS RELATIVE PERCENT: 1.8 %
EOSINOPHILS RELATIVE PERCENT: 1.9 %
EOSINOPHILS RELATIVE PERCENT: 2.1 %
EOSINOPHILS RELATIVE PERCENT: 2.2 %
EOSINOPHILS RELATIVE PERCENT: 2.4 %
EOSINOPHILS RELATIVE PERCENT: 2.4 %
EOSINOPHILS RELATIVE PERCENT: 2.6 %
EOSINOPHILS RELATIVE PERCENT: 3 %
EOSINOPHILS RELATIVE PERCENT: 3.1 %
EPITHELIAL CELLS, UA: ABNORMAL /HPF (ref 0–5)
ESTIMATED AVERAGE GLUCOSE: 125.5 MG/DL
ESTIMATED AVERAGE GLUCOSE: 134.1 MG/DL
GFR AFRICAN AMERICAN: 40
GFR AFRICAN AMERICAN: 42
GFR AFRICAN AMERICAN: 48
GFR AFRICAN AMERICAN: >60
GFR NON-AFRICAN AMERICAN: 33
GFR NON-AFRICAN AMERICAN: 35
GFR NON-AFRICAN AMERICAN: 39
GFR NON-AFRICAN AMERICAN: 53
GFR NON-AFRICAN AMERICAN: 57
GFR NON-AFRICAN AMERICAN: >60
GLUCOSE BLD-MCNC: 100 MG/DL (ref 70–99)
GLUCOSE BLD-MCNC: 101 MG/DL (ref 70–99)
GLUCOSE BLD-MCNC: 102 MG/DL (ref 70–99)
GLUCOSE BLD-MCNC: 102 MG/DL (ref 70–99)
GLUCOSE BLD-MCNC: 104 MG/DL (ref 70–99)
GLUCOSE BLD-MCNC: 105 MG/DL (ref 70–99)
GLUCOSE BLD-MCNC: 105 MG/DL (ref 70–99)
GLUCOSE BLD-MCNC: 106 MG/DL (ref 70–99)
GLUCOSE BLD-MCNC: 106 MG/DL (ref 70–99)
GLUCOSE BLD-MCNC: 107 MG/DL (ref 70–99)
GLUCOSE BLD-MCNC: 108 MG/DL (ref 70–99)
GLUCOSE BLD-MCNC: 109 MG/DL (ref 70–99)
GLUCOSE BLD-MCNC: 111 MG/DL (ref 70–99)
GLUCOSE BLD-MCNC: 112 MG/DL (ref 70–99)
GLUCOSE BLD-MCNC: 112 MG/DL (ref 70–99)
GLUCOSE BLD-MCNC: 114 MG/DL (ref 70–99)
GLUCOSE BLD-MCNC: 115 MG/DL (ref 70–99)
GLUCOSE BLD-MCNC: 116 MG/DL (ref 70–99)
GLUCOSE BLD-MCNC: 117 MG/DL (ref 70–99)
GLUCOSE BLD-MCNC: 117 MG/DL (ref 70–99)
GLUCOSE BLD-MCNC: 118 MG/DL (ref 70–99)
GLUCOSE BLD-MCNC: 118 MG/DL (ref 70–99)
GLUCOSE BLD-MCNC: 119 MG/DL (ref 70–99)
GLUCOSE BLD-MCNC: 120 MG/DL (ref 70–99)
GLUCOSE BLD-MCNC: 121 MG/DL (ref 70–99)
GLUCOSE BLD-MCNC: 122 MG/DL (ref 70–99)
GLUCOSE BLD-MCNC: 123 MG/DL (ref 70–99)
GLUCOSE BLD-MCNC: 124 MG/DL (ref 70–99)
GLUCOSE BLD-MCNC: 125 MG/DL (ref 70–99)
GLUCOSE BLD-MCNC: 126 MG/DL (ref 70–99)
GLUCOSE BLD-MCNC: 127 MG/DL (ref 70–99)
GLUCOSE BLD-MCNC: 129 MG/DL (ref 70–99)
GLUCOSE BLD-MCNC: 129 MG/DL (ref 70–99)
GLUCOSE BLD-MCNC: 130 MG/DL (ref 70–99)
GLUCOSE BLD-MCNC: 131 MG/DL (ref 70–99)
GLUCOSE BLD-MCNC: 132 MG/DL (ref 70–99)
GLUCOSE BLD-MCNC: 133 MG/DL (ref 70–99)
GLUCOSE BLD-MCNC: 134 MG/DL (ref 70–99)
GLUCOSE BLD-MCNC: 135 MG/DL (ref 70–99)
GLUCOSE BLD-MCNC: 136 MG/DL (ref 70–99)
GLUCOSE BLD-MCNC: 137 MG/DL (ref 70–99)
GLUCOSE BLD-MCNC: 137 MG/DL (ref 70–99)
GLUCOSE BLD-MCNC: 138 MG/DL (ref 70–99)
GLUCOSE BLD-MCNC: 139 MG/DL (ref 70–99)
GLUCOSE BLD-MCNC: 139 MG/DL (ref 70–99)
GLUCOSE BLD-MCNC: 141 MG/DL (ref 70–99)
GLUCOSE BLD-MCNC: 142 MG/DL (ref 70–99)
GLUCOSE BLD-MCNC: 143 MG/DL (ref 70–99)
GLUCOSE BLD-MCNC: 144 MG/DL (ref 70–99)
GLUCOSE BLD-MCNC: 145 MG/DL (ref 70–99)
GLUCOSE BLD-MCNC: 146 MG/DL (ref 70–99)
GLUCOSE BLD-MCNC: 148 MG/DL (ref 70–99)
GLUCOSE BLD-MCNC: 149 MG/DL (ref 70–99)
GLUCOSE BLD-MCNC: 150 MG/DL (ref 70–99)
GLUCOSE BLD-MCNC: 151 MG/DL (ref 70–99)
GLUCOSE BLD-MCNC: 153 MG/DL (ref 70–99)
GLUCOSE BLD-MCNC: 153 MG/DL (ref 70–99)
GLUCOSE BLD-MCNC: 154 MG/DL (ref 70–99)
GLUCOSE BLD-MCNC: 155 MG/DL (ref 70–99)
GLUCOSE BLD-MCNC: 156 MG/DL (ref 70–99)
GLUCOSE BLD-MCNC: 157 MG/DL (ref 70–99)
GLUCOSE BLD-MCNC: 158 MG/DL (ref 70–99)
GLUCOSE BLD-MCNC: 159 MG/DL (ref 70–99)
GLUCOSE BLD-MCNC: 159 MG/DL (ref 70–99)
GLUCOSE BLD-MCNC: 160 MG/DL (ref 70–99)
GLUCOSE BLD-MCNC: 161 MG/DL (ref 70–99)
GLUCOSE BLD-MCNC: 162 MG/DL (ref 70–99)
GLUCOSE BLD-MCNC: 163 MG/DL (ref 70–99)
GLUCOSE BLD-MCNC: 163 MG/DL (ref 70–99)
GLUCOSE BLD-MCNC: 164 MG/DL (ref 70–99)
GLUCOSE BLD-MCNC: 165 MG/DL (ref 70–99)
GLUCOSE BLD-MCNC: 166 MG/DL (ref 70–99)
GLUCOSE BLD-MCNC: 168 MG/DL (ref 70–99)
GLUCOSE BLD-MCNC: 168 MG/DL (ref 70–99)
GLUCOSE BLD-MCNC: 170 MG/DL (ref 70–99)
GLUCOSE BLD-MCNC: 170 MG/DL (ref 70–99)
GLUCOSE BLD-MCNC: 171 MG/DL (ref 70–99)
GLUCOSE BLD-MCNC: 172 MG/DL (ref 70–99)
GLUCOSE BLD-MCNC: 172 MG/DL (ref 70–99)
GLUCOSE BLD-MCNC: 173 MG/DL (ref 70–99)
GLUCOSE BLD-MCNC: 174 MG/DL (ref 70–99)
GLUCOSE BLD-MCNC: 174 MG/DL (ref 70–99)
GLUCOSE BLD-MCNC: 175 MG/DL (ref 70–99)
GLUCOSE BLD-MCNC: 175 MG/DL (ref 70–99)
GLUCOSE BLD-MCNC: 176 MG/DL (ref 70–99)
GLUCOSE BLD-MCNC: 177 MG/DL (ref 70–99)
GLUCOSE BLD-MCNC: 177 MG/DL (ref 70–99)
GLUCOSE BLD-MCNC: 178 MG/DL (ref 70–99)
GLUCOSE BLD-MCNC: 179 MG/DL (ref 70–99)
GLUCOSE BLD-MCNC: 180 MG/DL (ref 70–99)
GLUCOSE BLD-MCNC: 180 MG/DL (ref 70–99)
GLUCOSE BLD-MCNC: 181 MG/DL (ref 70–99)
GLUCOSE BLD-MCNC: 181 MG/DL (ref 70–99)
GLUCOSE BLD-MCNC: 183 MG/DL (ref 70–99)
GLUCOSE BLD-MCNC: 185 MG/DL (ref 70–99)
GLUCOSE BLD-MCNC: 186 MG/DL (ref 70–99)
GLUCOSE BLD-MCNC: 188 MG/DL (ref 70–99)
GLUCOSE BLD-MCNC: 188 MG/DL (ref 70–99)
GLUCOSE BLD-MCNC: 189 MG/DL (ref 70–99)
GLUCOSE BLD-MCNC: 190 MG/DL (ref 70–99)
GLUCOSE BLD-MCNC: 190 MG/DL (ref 70–99)
GLUCOSE BLD-MCNC: 191 MG/DL (ref 70–99)
GLUCOSE BLD-MCNC: 192 MG/DL (ref 70–99)
GLUCOSE BLD-MCNC: 193 MG/DL (ref 70–99)
GLUCOSE BLD-MCNC: 194 MG/DL (ref 70–99)
GLUCOSE BLD-MCNC: 195 MG/DL (ref 70–99)
GLUCOSE BLD-MCNC: 196 MG/DL (ref 70–99)
GLUCOSE BLD-MCNC: 196 MG/DL (ref 70–99)
GLUCOSE BLD-MCNC: 197 MG/DL (ref 70–99)
GLUCOSE BLD-MCNC: 198 MG/DL (ref 70–99)
GLUCOSE BLD-MCNC: 199 MG/DL (ref 70–99)
GLUCOSE BLD-MCNC: 200 MG/DL (ref 70–99)
GLUCOSE BLD-MCNC: 201 MG/DL (ref 70–99)
GLUCOSE BLD-MCNC: 202 MG/DL (ref 70–99)
GLUCOSE BLD-MCNC: 203 MG/DL (ref 70–99)
GLUCOSE BLD-MCNC: 203 MG/DL (ref 70–99)
GLUCOSE BLD-MCNC: 204 MG/DL (ref 70–99)
GLUCOSE BLD-MCNC: 204 MG/DL (ref 70–99)
GLUCOSE BLD-MCNC: 205 MG/DL (ref 70–99)
GLUCOSE BLD-MCNC: 206 MG/DL (ref 70–99)
GLUCOSE BLD-MCNC: 206 MG/DL (ref 70–99)
GLUCOSE BLD-MCNC: 208 MG/DL (ref 70–99)
GLUCOSE BLD-MCNC: 209 MG/DL (ref 70–99)
GLUCOSE BLD-MCNC: 211 MG/DL (ref 70–99)
GLUCOSE BLD-MCNC: 211 MG/DL (ref 70–99)
GLUCOSE BLD-MCNC: 213 MG/DL (ref 70–99)
GLUCOSE BLD-MCNC: 213 MG/DL (ref 70–99)
GLUCOSE BLD-MCNC: 215 MG/DL (ref 70–99)
GLUCOSE BLD-MCNC: 217 MG/DL (ref 70–99)
GLUCOSE BLD-MCNC: 219 MG/DL (ref 70–99)
GLUCOSE BLD-MCNC: 220 MG/DL (ref 70–99)
GLUCOSE BLD-MCNC: 222 MG/DL (ref 70–99)
GLUCOSE BLD-MCNC: 223 MG/DL (ref 70–99)
GLUCOSE BLD-MCNC: 224 MG/DL (ref 70–99)
GLUCOSE BLD-MCNC: 225 MG/DL (ref 70–99)
GLUCOSE BLD-MCNC: 225 MG/DL (ref 70–99)
GLUCOSE BLD-MCNC: 234 MG/DL (ref 70–99)
GLUCOSE BLD-MCNC: 234 MG/DL (ref 70–99)
GLUCOSE BLD-MCNC: 235 MG/DL (ref 70–99)
GLUCOSE BLD-MCNC: 235 MG/DL (ref 70–99)
GLUCOSE BLD-MCNC: 236 MG/DL (ref 70–99)
GLUCOSE BLD-MCNC: 237 MG/DL (ref 70–99)
GLUCOSE BLD-MCNC: 238 MG/DL (ref 70–99)
GLUCOSE BLD-MCNC: 239 MG/DL (ref 70–99)
GLUCOSE BLD-MCNC: 243 MG/DL (ref 70–99)
GLUCOSE BLD-MCNC: 244 MG/DL (ref 70–99)
GLUCOSE BLD-MCNC: 249 MG/DL (ref 70–99)
GLUCOSE BLD-MCNC: 251 MG/DL (ref 70–99)
GLUCOSE BLD-MCNC: 252 MG/DL (ref 70–99)
GLUCOSE BLD-MCNC: 257 MG/DL (ref 70–99)
GLUCOSE BLD-MCNC: 260 MG/DL (ref 70–99)
GLUCOSE BLD-MCNC: 262 MG/DL (ref 70–99)
GLUCOSE BLD-MCNC: 263 MG/DL (ref 70–99)
GLUCOSE BLD-MCNC: 266 MG/DL (ref 70–99)
GLUCOSE BLD-MCNC: 267 MG/DL (ref 70–99)
GLUCOSE BLD-MCNC: 268 MG/DL (ref 70–99)
GLUCOSE BLD-MCNC: 271 MG/DL (ref 70–99)
GLUCOSE BLD-MCNC: 276 MG/DL (ref 70–99)
GLUCOSE BLD-MCNC: 277 MG/DL (ref 70–99)
GLUCOSE BLD-MCNC: 279 MG/DL (ref 70–99)
GLUCOSE BLD-MCNC: 280 MG/DL (ref 70–99)
GLUCOSE BLD-MCNC: 284 MG/DL (ref 70–99)
GLUCOSE BLD-MCNC: 286 MG/DL (ref 70–99)
GLUCOSE BLD-MCNC: 287 MG/DL (ref 70–99)
GLUCOSE BLD-MCNC: 288 MG/DL (ref 70–99)
GLUCOSE BLD-MCNC: 288 MG/DL (ref 70–99)
GLUCOSE BLD-MCNC: 302 MG/DL (ref 70–99)
GLUCOSE BLD-MCNC: 306 MG/DL (ref 70–99)
GLUCOSE BLD-MCNC: 308 MG/DL (ref 70–99)
GLUCOSE BLD-MCNC: 309 MG/DL (ref 70–99)
GLUCOSE BLD-MCNC: 310 MG/DL (ref 70–99)
GLUCOSE BLD-MCNC: 314 MG/DL (ref 70–99)
GLUCOSE BLD-MCNC: 316 MG/DL (ref 70–99)
GLUCOSE BLD-MCNC: 318 MG/DL (ref 70–99)
GLUCOSE BLD-MCNC: 320 MG/DL (ref 70–99)
GLUCOSE BLD-MCNC: 326 MG/DL (ref 70–99)
GLUCOSE BLD-MCNC: 328 MG/DL (ref 70–99)
GLUCOSE BLD-MCNC: 330 MG/DL (ref 70–99)
GLUCOSE BLD-MCNC: 337 MG/DL (ref 70–99)
GLUCOSE BLD-MCNC: 341 MG/DL (ref 70–99)
GLUCOSE BLD-MCNC: 362 MG/DL (ref 70–99)
GLUCOSE BLD-MCNC: 364 MG/DL (ref 70–99)
GLUCOSE BLD-MCNC: 382 MG/DL (ref 70–99)
GLUCOSE BLD-MCNC: 443 MG/DL (ref 70–99)
GLUCOSE BLD-MCNC: 449 MG/DL (ref 70–99)
GLUCOSE BLD-MCNC: 46 MG/DL (ref 70–99)
GLUCOSE BLD-MCNC: 474 MG/DL (ref 70–99)
GLUCOSE BLD-MCNC: 486 MG/DL (ref 70–99)
GLUCOSE BLD-MCNC: 49 MG/DL (ref 70–99)
GLUCOSE BLD-MCNC: 56 MG/DL (ref 70–99)
GLUCOSE BLD-MCNC: 57 MG/DL (ref 70–99)
GLUCOSE BLD-MCNC: 58 MG/DL (ref 70–99)
GLUCOSE BLD-MCNC: 60 MG/DL (ref 70–99)
GLUCOSE BLD-MCNC: 60 MG/DL (ref 70–99)
GLUCOSE BLD-MCNC: 62 MG/DL (ref 70–99)
GLUCOSE BLD-MCNC: 63 MG/DL (ref 70–99)
GLUCOSE BLD-MCNC: 65 MG/DL (ref 70–99)
GLUCOSE BLD-MCNC: 65 MG/DL (ref 70–99)
GLUCOSE BLD-MCNC: 66 MG/DL (ref 70–99)
GLUCOSE BLD-MCNC: 66 MG/DL (ref 70–99)
GLUCOSE BLD-MCNC: 68 MG/DL (ref 70–99)
GLUCOSE BLD-MCNC: 68 MG/DL (ref 70–99)
GLUCOSE BLD-MCNC: 70 MG/DL (ref 70–99)
GLUCOSE BLD-MCNC: 71 MG/DL (ref 70–99)
GLUCOSE BLD-MCNC: 72 MG/DL (ref 70–99)
GLUCOSE BLD-MCNC: 72 MG/DL (ref 70–99)
GLUCOSE BLD-MCNC: 74 MG/DL (ref 70–99)
GLUCOSE BLD-MCNC: 75 MG/DL (ref 70–99)
GLUCOSE BLD-MCNC: 76 MG/DL (ref 70–99)
GLUCOSE BLD-MCNC: 78 MG/DL (ref 70–99)
GLUCOSE BLD-MCNC: 79 MG/DL (ref 70–99)
GLUCOSE BLD-MCNC: 80 MG/DL (ref 70–99)
GLUCOSE BLD-MCNC: 80 MG/DL (ref 70–99)
GLUCOSE BLD-MCNC: 81 MG/DL (ref 70–99)
GLUCOSE BLD-MCNC: 82 MG/DL (ref 70–99)
GLUCOSE BLD-MCNC: 82 MG/DL (ref 70–99)
GLUCOSE BLD-MCNC: 83 MG/DL (ref 70–99)
GLUCOSE BLD-MCNC: 84 MG/DL (ref 70–99)
GLUCOSE BLD-MCNC: 85 MG/DL (ref 70–99)
GLUCOSE BLD-MCNC: 85 MG/DL (ref 70–99)
GLUCOSE BLD-MCNC: 86 MG/DL (ref 70–99)
GLUCOSE BLD-MCNC: 87 MG/DL (ref 70–99)
GLUCOSE BLD-MCNC: 88 MG/DL (ref 70–99)
GLUCOSE BLD-MCNC: 88 MG/DL (ref 70–99)
GLUCOSE BLD-MCNC: 89 MG/DL (ref 70–99)
GLUCOSE BLD-MCNC: 90 MG/DL (ref 70–99)
GLUCOSE BLD-MCNC: 91 MG/DL (ref 70–99)
GLUCOSE BLD-MCNC: 93 MG/DL (ref 70–99)
GLUCOSE BLD-MCNC: 94 MG/DL (ref 70–99)
GLUCOSE BLD-MCNC: 96 MG/DL (ref 70–99)
GLUCOSE BLD-MCNC: 96 MG/DL (ref 70–99)
GLUCOSE BLD-MCNC: 97 MG/DL (ref 70–99)
GLUCOSE BLD-MCNC: 97 MG/DL (ref 70–99)
GLUCOSE BLD-MCNC: 99 MG/DL (ref 70–99)
GLUCOSE BLD-MCNC: >600 MG/DL (ref 70–99)
GLUCOSE URINE: NEGATIVE MG/DL
GLUCOSE URINE: NEGATIVE MG/DL
GRAM STAIN RESULT: ABNORMAL
GRAM STAIN RESULT: NORMAL
HBA1C MFR BLD: 6 %
HBA1C MFR BLD: 6.3 %
HBV SURFACE AB TITR SER: <3.5 MIU/ML
HCO3 ARTERIAL: 16.1 MMOL/L (ref 21–29)
HCO3 ARTERIAL: 17.8 MMOL/L (ref 21–29)
HCO3 ARTERIAL: 18.1 MMOL/L (ref 21–29)
HCO3 ARTERIAL: 18.9 MMOL/L (ref 21–29)
HCO3 ARTERIAL: 19.7 MMOL/L (ref 21–29)
HCO3 ARTERIAL: 19.9 MMOL/L (ref 21–29)
HCO3 ARTERIAL: 20.1 MMOL/L (ref 21–29)
HCO3 ARTERIAL: 21 MMOL/L (ref 21–29)
HCO3 ARTERIAL: 21.3 MMOL/L (ref 21–29)
HCO3 ARTERIAL: 21.3 MMOL/L (ref 21–29)
HCO3 ARTERIAL: 21.4 MMOL/L (ref 21–29)
HCO3 ARTERIAL: 21.7 MMOL/L (ref 21–29)
HCO3 ARTERIAL: 21.8 MMOL/L (ref 21–29)
HCO3 ARTERIAL: 22 MMOL/L (ref 21–29)
HCO3 ARTERIAL: 22.7 MMOL/L (ref 21–29)
HCO3 ARTERIAL: 22.9 MMOL/L (ref 21–29)
HCO3 ARTERIAL: 23.1 MMOL/L (ref 21–29)
HCO3 ARTERIAL: 23.1 MMOL/L (ref 21–29)
HCO3 ARTERIAL: 23.3 MMOL/L (ref 21–29)
HCO3 ARTERIAL: 23.6 MMOL/L (ref 21–29)
HCO3 ARTERIAL: 23.7 MMOL/L (ref 21–29)
HCO3 ARTERIAL: 23.8 MMOL/L (ref 21–29)
HCO3 ARTERIAL: 23.8 MMOL/L (ref 21–29)
HCO3 ARTERIAL: 24 MMOL/L (ref 21–29)
HCO3 ARTERIAL: 24.1 MMOL/L (ref 21–29)
HCO3 ARTERIAL: 24.5 MMOL/L (ref 21–29)
HCO3 ARTERIAL: 24.6 MMOL/L (ref 21–29)
HCO3 ARTERIAL: 24.7 MMOL/L (ref 21–29)
HCO3 VENOUS: 23.5 MMOL/L (ref 23–29)
HCO3 VENOUS: 25.6 MMOL/L (ref 23–29)
HCO3 VENOUS: 26.4 MMOL/L (ref 23–29)
HCT VFR BLD CALC: 26.7 % (ref 40.5–52.5)
HCT VFR BLD CALC: 27.8 % (ref 40.5–52.5)
HCT VFR BLD CALC: 27.9 % (ref 40.5–52.5)
HCT VFR BLD CALC: 28.3 % (ref 40.5–52.5)
HCT VFR BLD CALC: 28.4 % (ref 40.5–52.5)
HCT VFR BLD CALC: 28.7 % (ref 40.5–52.5)
HCT VFR BLD CALC: 28.8 % (ref 40.5–52.5)
HCT VFR BLD CALC: 29 % (ref 40.5–52.5)
HCT VFR BLD CALC: 29.1 % (ref 40.5–52.5)
HCT VFR BLD CALC: 29.2 % (ref 40.5–52.5)
HCT VFR BLD CALC: 29.4 % (ref 40.5–52.5)
HCT VFR BLD CALC: 29.6 % (ref 40.5–52.5)
HCT VFR BLD CALC: 29.8 % (ref 40.5–52.5)
HCT VFR BLD CALC: 30.5 % (ref 40.5–52.5)
HCT VFR BLD CALC: 30.8 % (ref 40.5–52.5)
HCT VFR BLD CALC: 30.8 % (ref 40.5–52.5)
HCT VFR BLD CALC: 30.9 % (ref 40.5–52.5)
HCT VFR BLD CALC: 31.4 % (ref 40.5–52.5)
HCT VFR BLD CALC: 31.9 % (ref 40.5–52.5)
HCT VFR BLD CALC: 32.1 % (ref 40.5–52.5)
HCT VFR BLD CALC: 32.2 % (ref 40.5–52.5)
HCT VFR BLD CALC: 32.2 % (ref 40.5–52.5)
HCT VFR BLD CALC: 32.5 % (ref 40.5–52.5)
HCT VFR BLD CALC: 32.6 % (ref 40.5–52.5)
HCT VFR BLD CALC: 32.7 % (ref 40.5–52.5)
HCT VFR BLD CALC: 32.7 % (ref 40.5–52.5)
HCT VFR BLD CALC: 32.8 % (ref 40.5–52.5)
HCT VFR BLD CALC: 32.8 % (ref 40.5–52.5)
HCT VFR BLD CALC: 33.1 % (ref 40.5–52.5)
HCT VFR BLD CALC: 33.2 % (ref 40.5–52.5)
HCT VFR BLD CALC: 33.2 % (ref 40.5–52.5)
HCT VFR BLD CALC: 33.3 % (ref 40.5–52.5)
HCT VFR BLD CALC: 33.5 % (ref 40.5–52.5)
HCT VFR BLD CALC: 33.7 % (ref 40.5–52.5)
HCT VFR BLD CALC: 33.8 % (ref 40.5–52.5)
HCT VFR BLD CALC: 33.9 % (ref 40.5–52.5)
HCT VFR BLD CALC: 34.5 % (ref 40.5–52.5)
HCT VFR BLD CALC: 34.6 % (ref 40.5–52.5)
HCT VFR BLD CALC: 34.6 % (ref 40.5–52.5)
HCT VFR BLD CALC: 34.8 % (ref 40.5–52.5)
HCT VFR BLD CALC: 35.1 % (ref 40.5–52.5)
HCT VFR BLD CALC: 35.6 % (ref 40.5–52.5)
HCT VFR BLD CALC: 36 % (ref 40.5–52.5)
HCT VFR BLD CALC: 36.4 % (ref 40.5–52.5)
HCT VFR BLD CALC: 36.6 % (ref 40.5–52.5)
HCT VFR BLD CALC: 37.2 % (ref 40.5–52.5)
HCT VFR BLD CALC: 38.1 % (ref 40.5–52.5)
HCT VFR BLD CALC: 38.2 % (ref 40.5–52.5)
HCT VFR BLD CALC: 40.1 % (ref 40.5–52.5)
HDLC SERPL-MCNC: 30 MG/DL (ref 40–60)
HEMOGLOBIN, ART, EXTENDED: 10 G/DL (ref 13.5–17.5)
HEMOGLOBIN, ART, EXTENDED: 10.2 G/DL (ref 13.5–17.5)
HEMOGLOBIN, ART, EXTENDED: 10.3 G/DL (ref 13.5–17.5)
HEMOGLOBIN, ART, EXTENDED: 10.5 G/DL (ref 13.5–17.5)
HEMOGLOBIN, ART, EXTENDED: 10.7 G/DL (ref 13.5–17.5)
HEMOGLOBIN, ART, EXTENDED: 10.7 G/DL (ref 13.5–17.5)
HEMOGLOBIN, ART, EXTENDED: 10.8 G/DL (ref 13.5–17.5)
HEMOGLOBIN, ART, EXTENDED: 10.9 G/DL (ref 13.5–17.5)
HEMOGLOBIN, ART, EXTENDED: 10.9 G/DL (ref 13.5–17.5)
HEMOGLOBIN, ART, EXTENDED: 11.1 G/DL (ref 13.5–17.5)
HEMOGLOBIN, ART, EXTENDED: 11.3 G/DL (ref 13.5–17.5)
HEMOGLOBIN, ART, EXTENDED: 11.5 G/DL (ref 13.5–17.5)
HEMOGLOBIN, ART, EXTENDED: 11.6 G/DL (ref 13.5–17.5)
HEMOGLOBIN, ART, EXTENDED: 11.6 G/DL (ref 13.5–17.5)
HEMOGLOBIN, ART, EXTENDED: 11.8 G/DL (ref 13.5–17.5)
HEMOGLOBIN, ART, EXTENDED: 11.9 G/DL (ref 13.5–17.5)
HEMOGLOBIN, ART, EXTENDED: 11.9 G/DL (ref 13.5–17.5)
HEMOGLOBIN, ART, EXTENDED: 12.1 G/DL (ref 13.5–17.5)
HEMOGLOBIN, ART, EXTENDED: 12.1 G/DL (ref 13.5–17.5)
HEMOGLOBIN, ART, EXTENDED: 12.3 G/DL (ref 13.5–17.5)
HEMOGLOBIN, ART, EXTENDED: 12.5 G/DL (ref 13.5–17.5)
HEMOGLOBIN, ART, EXTENDED: 12.9 G/DL (ref 13.5–17.5)
HEMOGLOBIN, ART, EXTENDED: 9.6 G/DL (ref 13.5–17.5)
HEMOGLOBIN: 10 G/DL (ref 13.5–17.5)
HEMOGLOBIN: 10.2 G/DL (ref 13.5–17.5)
HEMOGLOBIN: 10.2 G/DL (ref 13.5–17.5)
HEMOGLOBIN: 10.3 G/DL (ref 13.5–17.5)
HEMOGLOBIN: 10.4 G/DL (ref 13.5–17.5)
HEMOGLOBIN: 10.5 G/DL (ref 13.5–17.5)
HEMOGLOBIN: 10.6 G/DL (ref 13.5–17.5)
HEMOGLOBIN: 10.6 G/DL (ref 13.5–17.5)
HEMOGLOBIN: 10.7 G/DL (ref 13.5–17.5)
HEMOGLOBIN: 10.8 G/DL (ref 13.5–17.5)
HEMOGLOBIN: 10.8 G/DL (ref 13.5–17.5)
HEMOGLOBIN: 10.9 G/DL (ref 13.5–17.5)
HEMOGLOBIN: 11 G/DL (ref 13.5–17.5)
HEMOGLOBIN: 11 G/DL (ref 13.5–17.5)
HEMOGLOBIN: 11.2 G/DL (ref 13.5–17.5)
HEMOGLOBIN: 11.5 G/DL (ref 13.5–17.5)
HEMOGLOBIN: 11.5 G/DL (ref 13.5–17.5)
HEMOGLOBIN: 11.6 G/DL (ref 13.5–17.5)
HEMOGLOBIN: 11.8 G/DL (ref 13.5–17.5)
HEMOGLOBIN: 12 G/DL (ref 13.5–17.5)
HEMOGLOBIN: 12.2 G/DL (ref 13.5–17.5)
HEMOGLOBIN: 12.5 G/DL (ref 13.5–17.5)
HEMOGLOBIN: 8.5 G/DL (ref 13.5–17.5)
HEMOGLOBIN: 8.6 G/DL (ref 13.5–17.5)
HEMOGLOBIN: 8.9 G/DL (ref 13.5–17.5)
HEMOGLOBIN: 9 G/DL (ref 13.5–17.5)
HEMOGLOBIN: 9 G/DL (ref 13.5–17.5)
HEMOGLOBIN: 9.1 G/DL (ref 13.5–17.5)
HEMOGLOBIN: 9.1 G/DL (ref 13.5–17.5)
HEMOGLOBIN: 9.2 G/DL (ref 13.5–17.5)
HEMOGLOBIN: 9.5 G/DL (ref 13.5–17.5)
HEMOGLOBIN: 9.6 G/DL (ref 13.5–17.5)
HEMOGLOBIN: 9.7 G/DL (ref 13.5–17.5)
HEMOGLOBIN: 9.8 G/DL (ref 13.5–17.5)
HEPARIN INDUCED PLATELET ANTIBODY: POSITIVE
HEPARIN UNFRACTIONATED: NEGATIVE
HEPATITIS B CORE TOTAL ANTIBODY: NEGATIVE
HEPATITIS B SURFACE ANTIGEN INTERPRETATION: NORMAL
HYPOCHROMIA: ABNORMAL
HYPOCHROMIA: ABNORMAL
INFLUENZA A: NOT DETECTED
INFLUENZA A: NOT DETECTED
INFLUENZA B: NOT DETECTED
INFLUENZA B: NOT DETECTED
INR BLD: 1.25 (ref 0.88–1.12)
INR BLD: 1.48 (ref 0.88–1.12)
INR BLD: 1.89 (ref 0.88–1.12)
INR BLD: 1.93 (ref 0.88–1.12)
INR BLD: 2.51 (ref 0.88–1.12)
INR BLD: 3.36 (ref 0.88–1.12)
KETONES, URINE: ABNORMAL MG/DL
KETONES, URINE: NEGATIVE MG/DL
LACTIC ACID, SEPSIS: 1.9 MMOL/L (ref 0.4–1.9)
LACTIC ACID: 2.4 MMOL/L (ref 0.4–2)
LDL CHOLESTEROL CALCULATED: 19 MG/DL
LEUKOCYTE ESTERASE, URINE: ABNORMAL
LEUKOCYTE ESTERASE, URINE: ABNORMAL
LYMPHOCYTES ABSOLUTE: 0.2 K/UL (ref 1–5.1)
LYMPHOCYTES ABSOLUTE: 0.3 K/UL (ref 1–5.1)
LYMPHOCYTES ABSOLUTE: 0.4 K/UL (ref 1–5.1)
LYMPHOCYTES ABSOLUTE: 0.5 K/UL (ref 1–5.1)
LYMPHOCYTES ABSOLUTE: 0.6 K/UL (ref 1–5.1)
LYMPHOCYTES ABSOLUTE: 0.7 K/UL (ref 1–5.1)
LYMPHOCYTES ABSOLUTE: 0.8 K/UL (ref 1–5.1)
LYMPHOCYTES ABSOLUTE: 0.8 K/UL (ref 1–5.1)
LYMPHOCYTES ABSOLUTE: 0.9 K/UL (ref 1–5.1)
LYMPHOCYTES ABSOLUTE: 0.9 K/UL (ref 1–5.1)
LYMPHOCYTES ABSOLUTE: 1.3 K/UL (ref 1–5.1)
LYMPHOCYTES ABSOLUTE: 2 K/UL (ref 1–5.1)
LYMPHOCYTES RELATIVE PERCENT: 1 %
LYMPHOCYTES RELATIVE PERCENT: 1.2 %
LYMPHOCYTES RELATIVE PERCENT: 1.2 %
LYMPHOCYTES RELATIVE PERCENT: 11.4 %
LYMPHOCYTES RELATIVE PERCENT: 2 %
LYMPHOCYTES RELATIVE PERCENT: 2.3 %
LYMPHOCYTES RELATIVE PERCENT: 3 %
LYMPHOCYTES RELATIVE PERCENT: 3.8 %
LYMPHOCYTES RELATIVE PERCENT: 3.8 %
LYMPHOCYTES RELATIVE PERCENT: 4 %
LYMPHOCYTES RELATIVE PERCENT: 4.1 %
LYMPHOCYTES RELATIVE PERCENT: 4.5 %
LYMPHOCYTES RELATIVE PERCENT: 4.8 %
LYMPHOCYTES RELATIVE PERCENT: 5 %
LYMPHOCYTES RELATIVE PERCENT: 5.4 %
LYMPHOCYTES RELATIVE PERCENT: 5.5 %
LYMPHOCYTES RELATIVE PERCENT: 5.8 %
LYMPHOCYTES RELATIVE PERCENT: 5.9 %
LYMPHOCYTES RELATIVE PERCENT: 6 %
LYMPHOCYTES RELATIVE PERCENT: 6 %
LYMPHOCYTES RELATIVE PERCENT: 6.5 %
LYMPHOCYTES RELATIVE PERCENT: 6.5 %
LYMPHOCYTES RELATIVE PERCENT: 6.6 %
LYMPHOCYTES RELATIVE PERCENT: 6.6 %
LYMPHOCYTES RELATIVE PERCENT: 6.8 %
LYMPHOCYTES RELATIVE PERCENT: 6.9 %
LYMPHOCYTES RELATIVE PERCENT: 6.9 %
LYMPHOCYTES RELATIVE PERCENT: 7 %
LYMPHOCYTES RELATIVE PERCENT: 7.1 %
LYMPHOCYTES RELATIVE PERCENT: 7.3 %
LYMPHOCYTES RELATIVE PERCENT: 8.4 %
LYMPHOCYTES RELATIVE PERCENT: 9.6 %
LYMPHOCYTES RELATIVE PERCENT: 9.9 %
LYMPHOCYTES, BODY FLUID: 2 %
Lab: NORMAL
Lab: NORMAL
MACROPHAGE FLUID: 31 %
MAGNESIUM: 1.9 MG/DL (ref 1.8–2.4)
MAGNESIUM: 2 MG/DL (ref 1.8–2.4)
MAGNESIUM: 2.1 MG/DL (ref 1.8–2.4)
MAGNESIUM: 2.2 MG/DL (ref 1.8–2.4)
MAGNESIUM: 2.3 MG/DL (ref 1.8–2.4)
MAGNESIUM: 2.4 MG/DL (ref 1.8–2.4)
MAGNESIUM: 2.5 MG/DL (ref 1.8–2.4)
MAGNESIUM: 2.7 MG/DL (ref 1.8–2.4)
MCH RBC QN AUTO: 23.9 PG (ref 26–34)
MCH RBC QN AUTO: 24 PG (ref 26–34)
MCH RBC QN AUTO: 24.1 PG (ref 26–34)
MCH RBC QN AUTO: 24.1 PG (ref 26–34)
MCH RBC QN AUTO: 24.2 PG (ref 26–34)
MCH RBC QN AUTO: 24.2 PG (ref 26–34)
MCH RBC QN AUTO: 24.3 PG (ref 26–34)
MCH RBC QN AUTO: 24.4 PG (ref 26–34)
MCH RBC QN AUTO: 24.5 PG (ref 26–34)
MCH RBC QN AUTO: 24.5 PG (ref 26–34)
MCH RBC QN AUTO: 24.6 PG (ref 26–34)
MCH RBC QN AUTO: 26.2 PG (ref 26–34)
MCH RBC QN AUTO: 26.2 PG (ref 26–34)
MCH RBC QN AUTO: 26.3 PG (ref 26–34)
MCH RBC QN AUTO: 26.4 PG (ref 26–34)
MCH RBC QN AUTO: 26.5 PG (ref 26–34)
MCH RBC QN AUTO: 26.6 PG (ref 26–34)
MCH RBC QN AUTO: 26.7 PG (ref 26–34)
MCH RBC QN AUTO: 26.8 PG (ref 26–34)
MCH RBC QN AUTO: 26.8 PG (ref 26–34)
MCH RBC QN AUTO: 26.9 PG (ref 26–34)
MCH RBC QN AUTO: 27 PG (ref 26–34)
MCH RBC QN AUTO: 27 PG (ref 26–34)
MCH RBC QN AUTO: 27.1 PG (ref 26–34)
MCH RBC QN AUTO: 27.4 PG (ref 26–34)
MCH RBC QN AUTO: 27.5 PG (ref 26–34)
MCH RBC QN AUTO: 27.5 PG (ref 26–34)
MCH RBC QN AUTO: 27.6 PG (ref 26–34)
MCH RBC QN AUTO: 27.6 PG (ref 26–34)
MCH RBC QN AUTO: 27.7 PG (ref 26–34)
MCHC RBC AUTO-ENTMCNC: 29 G/DL (ref 31–36)
MCHC RBC AUTO-ENTMCNC: 30.1 G/DL (ref 31–36)
MCHC RBC AUTO-ENTMCNC: 30.9 G/DL (ref 31–36)
MCHC RBC AUTO-ENTMCNC: 30.9 G/DL (ref 31–36)
MCHC RBC AUTO-ENTMCNC: 31 G/DL (ref 31–36)
MCHC RBC AUTO-ENTMCNC: 31.1 G/DL (ref 31–36)
MCHC RBC AUTO-ENTMCNC: 31.1 G/DL (ref 31–36)
MCHC RBC AUTO-ENTMCNC: 31.2 G/DL (ref 31–36)
MCHC RBC AUTO-ENTMCNC: 31.3 G/DL (ref 31–36)
MCHC RBC AUTO-ENTMCNC: 31.3 G/DL (ref 31–36)
MCHC RBC AUTO-ENTMCNC: 31.4 G/DL (ref 31–36)
MCHC RBC AUTO-ENTMCNC: 31.5 G/DL (ref 31–36)
MCHC RBC AUTO-ENTMCNC: 31.6 G/DL (ref 31–36)
MCHC RBC AUTO-ENTMCNC: 31.7 G/DL (ref 31–36)
MCHC RBC AUTO-ENTMCNC: 31.8 G/DL (ref 31–36)
MCHC RBC AUTO-ENTMCNC: 31.9 G/DL (ref 31–36)
MCHC RBC AUTO-ENTMCNC: 32 G/DL (ref 31–36)
MCHC RBC AUTO-ENTMCNC: 32.1 G/DL (ref 31–36)
MCHC RBC AUTO-ENTMCNC: 32.2 G/DL (ref 31–36)
MCHC RBC AUTO-ENTMCNC: 32.2 G/DL (ref 31–36)
MCHC RBC AUTO-ENTMCNC: 32.4 G/DL (ref 31–36)
MCV RBC AUTO: 75.9 FL (ref 80–100)
MCV RBC AUTO: 76 FL (ref 80–100)
MCV RBC AUTO: 76.1 FL (ref 80–100)
MCV RBC AUTO: 76.1 FL (ref 80–100)
MCV RBC AUTO: 76.2 FL (ref 80–100)
MCV RBC AUTO: 76.3 FL (ref 80–100)
MCV RBC AUTO: 76.4 FL (ref 80–100)
MCV RBC AUTO: 76.4 FL (ref 80–100)
MCV RBC AUTO: 76.5 FL (ref 80–100)
MCV RBC AUTO: 76.6 FL (ref 80–100)
MCV RBC AUTO: 76.8 FL (ref 80–100)
MCV RBC AUTO: 77 FL (ref 80–100)
MCV RBC AUTO: 77.1 FL (ref 80–100)
MCV RBC AUTO: 83.1 FL (ref 80–100)
MCV RBC AUTO: 83.3 FL (ref 80–100)
MCV RBC AUTO: 83.6 FL (ref 80–100)
MCV RBC AUTO: 84 FL (ref 80–100)
MCV RBC AUTO: 84.1 FL (ref 80–100)
MCV RBC AUTO: 84.2 FL (ref 80–100)
MCV RBC AUTO: 84.4 FL (ref 80–100)
MCV RBC AUTO: 84.5 FL (ref 80–100)
MCV RBC AUTO: 84.6 FL (ref 80–100)
MCV RBC AUTO: 84.9 FL (ref 80–100)
MCV RBC AUTO: 84.9 FL (ref 80–100)
MCV RBC AUTO: 85 FL (ref 80–100)
MCV RBC AUTO: 85.4 FL (ref 80–100)
MCV RBC AUTO: 85.5 FL (ref 80–100)
MCV RBC AUTO: 85.6 FL (ref 80–100)
MCV RBC AUTO: 85.8 FL (ref 80–100)
MCV RBC AUTO: 85.9 FL (ref 80–100)
MCV RBC AUTO: 86.3 FL (ref 80–100)
MCV RBC AUTO: 86.4 FL (ref 80–100)
MCV RBC AUTO: 86.4 FL (ref 80–100)
MCV RBC AUTO: 86.5 FL (ref 80–100)
MCV RBC AUTO: 87.1 FL (ref 80–100)
MCV RBC AUTO: 88.8 FL (ref 80–100)
MCV RBC AUTO: 89 FL (ref 80–100)
MCV RBC AUTO: 92.7 FL (ref 80–100)
MESOTHELIAL FLUID: 12 %
METAMYELOCYTES RELATIVE PERCENT: 1 %
METHEMOGLOBIN ARTERIAL: 0.3 %
METHEMOGLOBIN VENOUS: 0.3 %
MICROSCOPIC EXAMINATION: YES
MICROSCOPIC EXAMINATION: YES
MONOCYTE, FLUID: 2 %
MONOCYTES ABSOLUTE: 0.3 K/UL (ref 0–1.3)
MONOCYTES ABSOLUTE: 0.4 K/UL (ref 0–1.3)
MONOCYTES ABSOLUTE: 0.4 K/UL (ref 0–1.3)
MONOCYTES ABSOLUTE: 0.5 K/UL (ref 0–1.3)
MONOCYTES ABSOLUTE: 0.6 K/UL (ref 0–1.3)
MONOCYTES ABSOLUTE: 0.7 K/UL (ref 0–1.3)
MONOCYTES ABSOLUTE: 0.8 K/UL (ref 0–1.3)
MONOCYTES ABSOLUTE: 0.9 K/UL (ref 0–1.3)
MONOCYTES ABSOLUTE: 1.1 K/UL (ref 0–1.3)
MONOCYTES ABSOLUTE: 1.2 K/UL (ref 0–1.3)
MONOCYTES ABSOLUTE: 1.3 K/UL (ref 0–1.3)
MONOCYTES ABSOLUTE: 1.3 K/UL (ref 0–1.3)
MONOCYTES ABSOLUTE: 1.4 K/UL (ref 0–1.3)
MONOCYTES RELATIVE PERCENT: 10.1 %
MONOCYTES RELATIVE PERCENT: 10.3 %
MONOCYTES RELATIVE PERCENT: 10.4 %
MONOCYTES RELATIVE PERCENT: 10.8 %
MONOCYTES RELATIVE PERCENT: 11.5 %
MONOCYTES RELATIVE PERCENT: 12.2 %
MONOCYTES RELATIVE PERCENT: 14.4 %
MONOCYTES RELATIVE PERCENT: 16.9 %
MONOCYTES RELATIVE PERCENT: 17.8 %
MONOCYTES RELATIVE PERCENT: 2 %
MONOCYTES RELATIVE PERCENT: 2.8 %
MONOCYTES RELATIVE PERCENT: 21 %
MONOCYTES RELATIVE PERCENT: 3 %
MONOCYTES RELATIVE PERCENT: 3.4 %
MONOCYTES RELATIVE PERCENT: 3.5 %
MONOCYTES RELATIVE PERCENT: 4 %
MONOCYTES RELATIVE PERCENT: 4.4 %
MONOCYTES RELATIVE PERCENT: 4.4 %
MONOCYTES RELATIVE PERCENT: 4.6 %
MONOCYTES RELATIVE PERCENT: 4.8 %
MONOCYTES RELATIVE PERCENT: 5.3 %
MONOCYTES RELATIVE PERCENT: 5.4 %
MONOCYTES RELATIVE PERCENT: 7.3 %
MONOCYTES RELATIVE PERCENT: 7.4 %
MONOCYTES RELATIVE PERCENT: 7.9 %
MONOCYTES RELATIVE PERCENT: 8.2 %
MONOCYTES RELATIVE PERCENT: 8.8 %
MONOCYTES RELATIVE PERCENT: 8.9 %
MONOCYTES RELATIVE PERCENT: 9.4 %
MONOCYTES RELATIVE PERCENT: 9.7 %
MONOCYTES RELATIVE PERCENT: 9.7 %
MONOCYTES RELATIVE PERCENT: 9.9 %
MYELOCYTE PERCENT: 1 %
NEUTROPHIL, FLUID: 53 %
NEUTROPHILS ABSOLUTE: 11.5 K/UL (ref 1.7–7.7)
NEUTROPHILS ABSOLUTE: 12.1 K/UL (ref 1.7–7.7)
NEUTROPHILS ABSOLUTE: 16.3 K/UL (ref 1.7–7.7)
NEUTROPHILS ABSOLUTE: 16.7 K/UL (ref 1.7–7.7)
NEUTROPHILS ABSOLUTE: 18.1 K/UL (ref 1.7–7.7)
NEUTROPHILS ABSOLUTE: 18.6 K/UL (ref 1.7–7.7)
NEUTROPHILS ABSOLUTE: 22.3 K/UL (ref 1.7–7.7)
NEUTROPHILS ABSOLUTE: 22.3 K/UL (ref 1.7–7.7)
NEUTROPHILS ABSOLUTE: 24.6 K/UL (ref 1.7–7.7)
NEUTROPHILS ABSOLUTE: 27.4 K/UL (ref 1.7–7.7)
NEUTROPHILS ABSOLUTE: 29.8 K/UL (ref 1.7–7.7)
NEUTROPHILS ABSOLUTE: 3.6 K/UL (ref 1.7–7.7)
NEUTROPHILS ABSOLUTE: 30 K/UL (ref 1.7–7.7)
NEUTROPHILS ABSOLUTE: 4.6 K/UL (ref 1.7–7.7)
NEUTROPHILS ABSOLUTE: 4.6 K/UL (ref 1.7–7.7)
NEUTROPHILS ABSOLUTE: 5 K/UL (ref 1.7–7.7)
NEUTROPHILS ABSOLUTE: 5.2 K/UL (ref 1.7–7.7)
NEUTROPHILS ABSOLUTE: 5.4 K/UL (ref 1.7–7.7)
NEUTROPHILS ABSOLUTE: 5.5 K/UL (ref 1.7–7.7)
NEUTROPHILS ABSOLUTE: 5.6 K/UL (ref 1.7–7.7)
NEUTROPHILS ABSOLUTE: 5.7 K/UL (ref 1.7–7.7)
NEUTROPHILS ABSOLUTE: 5.8 K/UL (ref 1.7–7.7)
NEUTROPHILS ABSOLUTE: 5.8 K/UL (ref 1.7–7.7)
NEUTROPHILS ABSOLUTE: 6 K/UL (ref 1.7–7.7)
NEUTROPHILS ABSOLUTE: 6.3 K/UL (ref 1.7–7.7)
NEUTROPHILS ABSOLUTE: 6.5 K/UL (ref 1.7–7.7)
NEUTROPHILS ABSOLUTE: 6.6 K/UL (ref 1.7–7.7)
NEUTROPHILS ABSOLUTE: 6.6 K/UL (ref 1.7–7.7)
NEUTROPHILS ABSOLUTE: 7 K/UL (ref 1.7–7.7)
NEUTROPHILS ABSOLUTE: 7.1 K/UL (ref 1.7–7.7)
NEUTROPHILS ABSOLUTE: 9 K/UL (ref 1.7–7.7)
NEUTROPHILS ABSOLUTE: 9.3 K/UL (ref 1.7–7.7)
NEUTROPHILS ABSOLUTE: 9.6 K/UL (ref 1.7–7.7)
NEUTROPHILS RELATIVE PERCENT: 63.6 %
NEUTROPHILS RELATIVE PERCENT: 72.7 %
NEUTROPHILS RELATIVE PERCENT: 74.9 %
NEUTROPHILS RELATIVE PERCENT: 76.9 %
NEUTROPHILS RELATIVE PERCENT: 78.9 %
NEUTROPHILS RELATIVE PERCENT: 79.5 %
NEUTROPHILS RELATIVE PERCENT: 79.9 %
NEUTROPHILS RELATIVE PERCENT: 80.2 %
NEUTROPHILS RELATIVE PERCENT: 80.2 %
NEUTROPHILS RELATIVE PERCENT: 80.4 %
NEUTROPHILS RELATIVE PERCENT: 80.4 %
NEUTROPHILS RELATIVE PERCENT: 80.6 %
NEUTROPHILS RELATIVE PERCENT: 80.6 %
NEUTROPHILS RELATIVE PERCENT: 80.7 %
NEUTROPHILS RELATIVE PERCENT: 81.1 %
NEUTROPHILS RELATIVE PERCENT: 81.2 %
NEUTROPHILS RELATIVE PERCENT: 81.9 %
NEUTROPHILS RELATIVE PERCENT: 82.1 %
NEUTROPHILS RELATIVE PERCENT: 84.9 %
NEUTROPHILS RELATIVE PERCENT: 85 %
NEUTROPHILS RELATIVE PERCENT: 86 %
NEUTROPHILS RELATIVE PERCENT: 86.3 %
NEUTROPHILS RELATIVE PERCENT: 88.4 %
NEUTROPHILS RELATIVE PERCENT: 88.5 %
NEUTROPHILS RELATIVE PERCENT: 89 %
NEUTROPHILS RELATIVE PERCENT: 89.2 %
NEUTROPHILS RELATIVE PERCENT: 91 %
NEUTROPHILS RELATIVE PERCENT: 91 %
NEUTROPHILS RELATIVE PERCENT: 91.7 %
NEUTROPHILS RELATIVE PERCENT: 92 %
NEUTROPHILS RELATIVE PERCENT: 92.9 %
NEUTROPHILS RELATIVE PERCENT: 93.2 %
NEUTROPHILS RELATIVE PERCENT: 94 %
NEUTROPHILS RELATIVE PERCENT: 94.2 %
NEUTROPHILS RELATIVE PERCENT: 95.7 %
NITRITE, URINE: NEGATIVE
NITRITE, URINE: NEGATIVE
NUCLEATED CELLS FLUID: 401 /CUMM
NUMBER OF CELLS COUNTED FLUID: 100
O2 CONTENT, VEN: 9 VOL %
O2 SAT, ARTERIAL: 91.5 %
O2 SAT, ARTERIAL: 92.3 %
O2 SAT, ARTERIAL: 92.9 %
O2 SAT, ARTERIAL: 94 %
O2 SAT, ARTERIAL: 94.1 %
O2 SAT, ARTERIAL: 94.1 %
O2 SAT, ARTERIAL: 95.1 %
O2 SAT, ARTERIAL: 95.3 %
O2 SAT, ARTERIAL: 95.4 %
O2 SAT, ARTERIAL: 96.5 %
O2 SAT, ARTERIAL: 96.5 %
O2 SAT, ARTERIAL: 97 %
O2 SAT, ARTERIAL: 97 %
O2 SAT, ARTERIAL: 97.1 %
O2 SAT, ARTERIAL: 97.1 %
O2 SAT, ARTERIAL: 97.3 %
O2 SAT, ARTERIAL: 97.8 %
O2 SAT, ARTERIAL: 97.8 %
O2 SAT, ARTERIAL: 97.9 %
O2 SAT, ARTERIAL: 98.1 %
O2 SAT, ARTERIAL: 98.1 %
O2 SAT, ARTERIAL: 98.7 %
O2 SAT, ARTERIAL: 98.7 %
O2 SAT, ARTERIAL: 99.2 %
O2 SAT, ARTERIAL: 99.3 %
O2 SAT, ARTERIAL: 99.4 %
O2 SAT, ARTERIAL: 99.4 %
O2 SAT, ARTERIAL: 99.5 %
O2 SAT, VEN: 50 %
O2 SAT, VEN: 69 %
O2 SAT, VEN: 82 %
O2 THERAPY: ABNORMAL
ORGANISM: ABNORMAL
PCO2 ARTERIAL: 29.7 MMHG (ref 35–45)
PCO2 ARTERIAL: 29.7 MMHG (ref 35–45)
PCO2 ARTERIAL: 30.3 MMHG (ref 35–45)
PCO2 ARTERIAL: 30.4 MMHG (ref 35–45)
PCO2 ARTERIAL: 31.8 MMHG (ref 35–45)
PCO2 ARTERIAL: 31.9 MMHG (ref 35–45)
PCO2 ARTERIAL: 32.3 MMHG (ref 35–45)
PCO2 ARTERIAL: 32.8 MMHG (ref 35–45)
PCO2 ARTERIAL: 33.4 MMHG (ref 35–45)
PCO2 ARTERIAL: 33.5 MMHG (ref 35–45)
PCO2 ARTERIAL: 33.6 MMHG (ref 35–45)
PCO2 ARTERIAL: 33.6 MMHG (ref 35–45)
PCO2 ARTERIAL: 34 MMHG (ref 35–45)
PCO2 ARTERIAL: 34.6 MMHG (ref 35–45)
PCO2 ARTERIAL: 35 MMHG (ref 35–45)
PCO2 ARTERIAL: 36.9 MMHG (ref 35–45)
PCO2 ARTERIAL: 37 MMHG (ref 35–45)
PCO2 ARTERIAL: 37.1 MMHG (ref 35–45)
PCO2 ARTERIAL: 37.1 MMHG (ref 35–45)
PCO2 ARTERIAL: 39.3 MMHG (ref 35–45)
PCO2 ARTERIAL: 41 MMHG (ref 35–45)
PCO2 ARTERIAL: 41.8 MMHG (ref 35–45)
PCO2 ARTERIAL: 41.9 MMHG (ref 35–45)
PCO2 ARTERIAL: 42.1 MMHG (ref 35–45)
PCO2 ARTERIAL: 43.4 MMHG (ref 35–45)
PCO2 ARTERIAL: 44.9 MMHG (ref 35–45)
PCO2 ARTERIAL: 53.6 MMHG (ref 35–45)
PCO2 ARTERIAL: 61.8 MMHG (ref 35–45)
PCO2, VEN: 42.8 MMHG (ref 40–50)
PCO2, VEN: 45.1 MMHG (ref 40–50)
PCO2, VEN: 68.6 MMHG (ref 40–50)
PDW BLD-RTO: 18.1 % (ref 12.4–15.4)
PDW BLD-RTO: 18.6 % (ref 12.4–15.4)
PDW BLD-RTO: 18.7 % (ref 12.4–15.4)
PDW BLD-RTO: 18.7 % (ref 12.4–15.4)
PDW BLD-RTO: 18.8 % (ref 12.4–15.4)
PDW BLD-RTO: 18.8 % (ref 12.4–15.4)
PDW BLD-RTO: 18.9 % (ref 12.4–15.4)
PDW BLD-RTO: 18.9 % (ref 12.4–15.4)
PDW BLD-RTO: 19.1 % (ref 12.4–15.4)
PDW BLD-RTO: 19.2 % (ref 12.4–15.4)
PDW BLD-RTO: 19.3 % (ref 12.4–15.4)
PDW BLD-RTO: 19.3 % (ref 12.4–15.4)
PDW BLD-RTO: 19.4 % (ref 12.4–15.4)
PDW BLD-RTO: 19.5 % (ref 12.4–15.4)
PDW BLD-RTO: 19.6 % (ref 12.4–15.4)
PDW BLD-RTO: 19.7 % (ref 12.4–15.4)
PDW BLD-RTO: 19.8 % (ref 12.4–15.4)
PDW BLD-RTO: 19.9 % (ref 12.4–15.4)
PDW BLD-RTO: 20.1 % (ref 12.4–15.4)
PDW BLD-RTO: 20.1 % (ref 12.4–15.4)
PDW BLD-RTO: 20.2 % (ref 12.4–15.4)
PDW BLD-RTO: 20.3 % (ref 12.4–15.4)
PDW BLD-RTO: 20.3 % (ref 12.4–15.4)
PDW BLD-RTO: 20.4 % (ref 12.4–15.4)
PDW BLD-RTO: 20.4 % (ref 12.4–15.4)
PDW BLD-RTO: 20.5 % (ref 12.4–15.4)
PDW BLD-RTO: 20.8 % (ref 12.4–15.4)
PDW BLD-RTO: 21.2 % (ref 12.4–15.4)
PDW BLD-RTO: 21.3 % (ref 12.4–15.4)
PDW BLD-RTO: 21.6 % (ref 12.4–15.4)
PDW BLD-RTO: 21.8 % (ref 12.4–15.4)
PDW BLD-RTO: 22 % (ref 12.4–15.4)
PERFORMED ON: ABNORMAL
PERFORMED ON: NORMAL
PH ARTERIAL: 7.19 (ref 7.35–7.45)
PH ARTERIAL: 7.27 (ref 7.35–7.45)
PH ARTERIAL: 7.28 (ref 7.35–7.45)
PH ARTERIAL: 7.3 (ref 7.35–7.45)
PH ARTERIAL: 7.34 (ref 7.35–7.45)
PH ARTERIAL: 7.35 (ref 7.35–7.45)
PH ARTERIAL: 7.36 (ref 7.35–7.45)
PH ARTERIAL: 7.36 (ref 7.35–7.45)
PH ARTERIAL: 7.37 (ref 7.35–7.45)
PH ARTERIAL: 7.37 (ref 7.35–7.45)
PH ARTERIAL: 7.38 (ref 7.35–7.45)
PH ARTERIAL: 7.39 (ref 7.35–7.45)
PH ARTERIAL: 7.41 (ref 7.35–7.45)
PH ARTERIAL: 7.41 (ref 7.35–7.45)
PH ARTERIAL: 7.42 (ref 7.35–7.45)
PH ARTERIAL: 7.43 (ref 7.35–7.45)
PH ARTERIAL: 7.44 (ref 7.35–7.45)
PH ARTERIAL: 7.45 (ref 7.35–7.45)
PH ARTERIAL: 7.45 (ref 7.35–7.45)
PH ARTERIAL: 7.48 (ref 7.35–7.45)
PH ARTERIAL: 7.49 (ref 7.35–7.45)
PH UA: 5 (ref 5–8)
PH UA: 7 (ref 5–8)
PH VENOUS: 7.12 (ref 7.35–7.45)
PH VENOUS: 7.17 (ref 7.35–7.45)
PH VENOUS: 7.18 (ref 7.35–7.45)
PH VENOUS: 7.2 (ref 7.35–7.45)
PH VENOUS: 7.21 (ref 7.35–7.45)
PH VENOUS: 7.23 (ref 7.35–7.45)
PH VENOUS: 7.24 (ref 7.35–7.45)
PH VENOUS: 7.25 (ref 7.35–7.45)
PH VENOUS: 7.26 (ref 7.35–7.45)
PH VENOUS: 7.27 (ref 7.35–7.45)
PH VENOUS: 7.27 (ref 7.35–7.45)
PH VENOUS: 7.28 (ref 7.35–7.45)
PH VENOUS: 7.29 (ref 7.35–7.45)
PH VENOUS: 7.3 (ref 7.35–7.45)
PH VENOUS: 7.31 (ref 7.35–7.45)
PH VENOUS: 7.32 (ref 7.35–7.45)
PH VENOUS: 7.33 (ref 7.35–7.45)
PH VENOUS: 7.34 (ref 7.35–7.45)
PH VENOUS: 7.35 (ref 7.35–7.45)
PH VENOUS: 7.36 (ref 7.35–7.45)
PH VENOUS: 7.37 (ref 7.35–7.45)
PH VENOUS: 7.38 (ref 7.35–7.45)
PH VENOUS: 7.39 (ref 7.35–7.45)
PH VENOUS: 7.4 (ref 7.35–7.45)
PH VENOUS: 7.41 (ref 7.35–7.45)
PH VENOUS: 7.42 (ref 7.35–7.45)
PH VENOUS: 7.43 (ref 7.35–7.45)
PH VENOUS: 7.44 (ref 7.35–7.45)
PH VENOUS: 7.45 (ref 7.35–7.45)
PH VENOUS: 7.45 (ref 7.35–7.45)
PHOSPHORUS: 1.1 MG/DL (ref 2.5–4.9)
PHOSPHORUS: 1.7 MG/DL (ref 2.5–4.9)
PHOSPHORUS: 1.7 MG/DL (ref 2.5–4.9)
PHOSPHORUS: 1.8 MG/DL (ref 2.5–4.9)
PHOSPHORUS: 1.9 MG/DL (ref 2.5–4.9)
PHOSPHORUS: 2 MG/DL (ref 2.5–4.9)
PHOSPHORUS: 2.1 MG/DL (ref 2.5–4.9)
PHOSPHORUS: 2.2 MG/DL (ref 2.5–4.9)
PHOSPHORUS: 2.3 MG/DL (ref 2.5–4.9)
PHOSPHORUS: 2.4 MG/DL (ref 2.5–4.9)
PHOSPHORUS: 2.5 MG/DL (ref 2.5–4.9)
PHOSPHORUS: 2.6 MG/DL (ref 2.5–4.9)
PHOSPHORUS: 2.7 MG/DL (ref 2.5–4.9)
PHOSPHORUS: 2.8 MG/DL (ref 2.5–4.9)
PHOSPHORUS: 2.9 MG/DL (ref 2.5–4.9)
PHOSPHORUS: 3 MG/DL (ref 2.5–4.9)
PHOSPHORUS: 3 MG/DL (ref 2.5–4.9)
PHOSPHORUS: 3.1 MG/DL (ref 2.5–4.9)
PHOSPHORUS: 3.2 MG/DL (ref 2.5–4.9)
PHOSPHORUS: 3.3 MG/DL (ref 2.5–4.9)
PHOSPHORUS: 3.5 MG/DL (ref 2.5–4.9)
PHOSPHORUS: 3.6 MG/DL (ref 2.5–4.9)
PHOSPHORUS: 3.6 MG/DL (ref 2.5–4.9)
PHOSPHORUS: 3.7 MG/DL (ref 2.5–4.9)
PHOSPHORUS: 3.8 MG/DL (ref 2.5–4.9)
PHOSPHORUS: 3.9 MG/DL (ref 2.5–4.9)
PHOSPHORUS: 4 MG/DL (ref 2.5–4.9)
PHOSPHORUS: 4 MG/DL (ref 2.5–4.9)
PHOSPHORUS: 4.1 MG/DL (ref 2.5–4.9)
PHOSPHORUS: 4.2 MG/DL (ref 2.5–4.9)
PHOSPHORUS: 4.2 MG/DL (ref 2.5–4.9)
PHOSPHORUS: 4.3 MG/DL (ref 2.5–4.9)
PHOSPHORUS: 4.5 MG/DL (ref 2.5–4.9)
PLATELET # BLD: 106 K/UL (ref 135–450)
PLATELET # BLD: 110 K/UL (ref 135–450)
PLATELET # BLD: 116 K/UL (ref 135–450)
PLATELET # BLD: 118 K/UL (ref 135–450)
PLATELET # BLD: 123 K/UL (ref 135–450)
PLATELET # BLD: 131 K/UL (ref 135–450)
PLATELET # BLD: 132 K/UL (ref 135–450)
PLATELET # BLD: 137 K/UL (ref 135–450)
PLATELET # BLD: 145 K/UL (ref 135–450)
PLATELET # BLD: 147 K/UL (ref 135–450)
PLATELET # BLD: 152 K/UL (ref 135–450)
PLATELET # BLD: 152 K/UL (ref 135–450)
PLATELET # BLD: 153 K/UL (ref 135–450)
PLATELET # BLD: 155 K/UL (ref 135–450)
PLATELET # BLD: 155 K/UL (ref 135–450)
PLATELET # BLD: 157 K/UL (ref 135–450)
PLATELET # BLD: 157 K/UL (ref 135–450)
PLATELET # BLD: 159 K/UL (ref 135–450)
PLATELET # BLD: 163 K/UL (ref 135–450)
PLATELET # BLD: 166 K/UL (ref 135–450)
PLATELET # BLD: 169 K/UL (ref 135–450)
PLATELET # BLD: 172 K/UL (ref 135–450)
PLATELET # BLD: 172 K/UL (ref 135–450)
PLATELET # BLD: 185 K/UL (ref 135–450)
PLATELET # BLD: 187 K/UL (ref 135–450)
PLATELET # BLD: 187 K/UL (ref 135–450)
PLATELET # BLD: 188 K/UL (ref 135–450)
PLATELET # BLD: 192 K/UL (ref 135–450)
PLATELET # BLD: 195 K/UL (ref 135–450)
PLATELET # BLD: 195 K/UL (ref 135–450)
PLATELET # BLD: 196 K/UL (ref 135–450)
PLATELET # BLD: 197 K/UL (ref 135–450)
PLATELET # BLD: 198 K/UL (ref 135–450)
PLATELET # BLD: 201 K/UL (ref 135–450)
PLATELET # BLD: 202 K/UL (ref 135–450)
PLATELET # BLD: 203 K/UL (ref 135–450)
PLATELET # BLD: 204 K/UL (ref 135–450)
PLATELET # BLD: 205 K/UL (ref 135–450)
PLATELET # BLD: 218 K/UL (ref 135–450)
PLATELET # BLD: 72 K/UL (ref 135–450)
PLATELET # BLD: 83 K/UL (ref 135–450)
PLATELET # BLD: 85 K/UL (ref 135–450)
PLATELET # BLD: 86 K/UL (ref 135–450)
PLATELET # BLD: 89 K/UL (ref 135–450)
PLATELET # BLD: 94 K/UL (ref 135–450)
PLATELET # BLD: 94 K/UL (ref 135–450)
PLATELET # BLD: 99 K/UL (ref 135–450)
PLATELET # BLD: ABNORMAL K/UL (ref 135–450)
PLATELET # BLD: ABNORMAL K/UL (ref 135–450)
PLATELET SLIDE REVIEW: ABNORMAL
PLATELET SLIDE REVIEW: ADEQUATE
PMV BLD AUTO: 10.1 FL (ref 5–10.5)
PMV BLD AUTO: 7.6 FL (ref 5–10.5)
PMV BLD AUTO: 7.7 FL (ref 5–10.5)
PMV BLD AUTO: 7.8 FL (ref 5–10.5)
PMV BLD AUTO: 7.9 FL (ref 5–10.5)
PMV BLD AUTO: 8 FL (ref 5–10.5)
PMV BLD AUTO: 8.1 FL (ref 5–10.5)
PMV BLD AUTO: 8.2 FL (ref 5–10.5)
PMV BLD AUTO: 8.2 FL (ref 5–10.5)
PMV BLD AUTO: 8.3 FL (ref 5–10.5)
PMV BLD AUTO: 8.4 FL (ref 5–10.5)
PMV BLD AUTO: 8.4 FL (ref 5–10.5)
PMV BLD AUTO: 8.5 FL (ref 5–10.5)
PMV BLD AUTO: 8.6 FL (ref 5–10.5)
PMV BLD AUTO: 8.7 FL (ref 5–10.5)
PMV BLD AUTO: 8.8 FL (ref 5–10.5)
PMV BLD AUTO: 8.8 FL (ref 5–10.5)
PMV BLD AUTO: 8.9 FL (ref 5–10.5)
PMV BLD AUTO: 9 FL (ref 5–10.5)
PMV BLD AUTO: 9 FL (ref 5–10.5)
PMV BLD AUTO: 9.1 FL (ref 5–10.5)
PMV BLD AUTO: 9.2 FL (ref 5–10.5)
PMV BLD AUTO: 9.4 FL (ref 5–10.5)
PMV BLD AUTO: 9.5 FL (ref 5–10.5)
PMV BLD AUTO: ABNORMAL FL (ref 5–10.5)
PMV BLD AUTO: ABNORMAL FL (ref 5–10.5)
PO2 ARTERIAL: 104.7 MMHG (ref 75–108)
PO2 ARTERIAL: 105.9 MMHG (ref 75–108)
PO2 ARTERIAL: 113.9 MMHG (ref 75–108)
PO2 ARTERIAL: 117.2 MMHG (ref 75–108)
PO2 ARTERIAL: 121.8 MMHG (ref 75–108)
PO2 ARTERIAL: 121.9 MMHG (ref 75–108)
PO2 ARTERIAL: 123.6 MMHG (ref 75–108)
PO2 ARTERIAL: 167.5 MMHG (ref 75–108)
PO2 ARTERIAL: 193.6 MMHG (ref 75–108)
PO2 ARTERIAL: 199.9 MMHG (ref 75–108)
PO2 ARTERIAL: 207 MMHG (ref 75–108)
PO2 ARTERIAL: 224 MMHG (ref 75–108)
PO2 ARTERIAL: 58 MMHG (ref 75–108)
PO2 ARTERIAL: 62.2 MMHG (ref 75–108)
PO2 ARTERIAL: 66.1 MMHG (ref 75–108)
PO2 ARTERIAL: 66.6 MMHG (ref 75–108)
PO2 ARTERIAL: 72 MMHG (ref 75–108)
PO2 ARTERIAL: 72.4 MMHG (ref 75–108)
PO2 ARTERIAL: 76.6 MMHG (ref 75–108)
PO2 ARTERIAL: 81.1 MMHG (ref 75–108)
PO2 ARTERIAL: 82.9 MMHG (ref 75–108)
PO2 ARTERIAL: 85.6 MMHG (ref 75–108)
PO2 ARTERIAL: 87.1 MMHG (ref 75–108)
PO2 ARTERIAL: 88.2 MMHG (ref 75–108)
PO2 ARTERIAL: 88.8 MMHG (ref 75–108)
PO2 ARTERIAL: 93.2 MMHG (ref 75–108)
PO2 ARTERIAL: 99.1 MMHG (ref 75–108)
PO2 ARTERIAL: 99.7 MMHG (ref 75–108)
PO2, VEN: 28.6 MMHG (ref 25–40)
PO2, VEN: 44.5 MMHG (ref 25–40)
PO2, VEN: 46.5 MMHG (ref 25–40)
POIKILOCYTES: ABNORMAL
POLYCHROMASIA: ABNORMAL
POTASSIUM REFLEX MAGNESIUM: 3.4 MMOL/L (ref 3.5–5.1)
POTASSIUM REFLEX MAGNESIUM: 3.5 MMOL/L (ref 3.5–5.1)
POTASSIUM REFLEX MAGNESIUM: 3.6 MMOL/L (ref 3.5–5.1)
POTASSIUM REFLEX MAGNESIUM: 3.7 MMOL/L (ref 3.5–5.1)
POTASSIUM REFLEX MAGNESIUM: 3.8 MMOL/L (ref 3.5–5.1)
POTASSIUM REFLEX MAGNESIUM: 3.9 MMOL/L (ref 3.5–5.1)
POTASSIUM REFLEX MAGNESIUM: 4 MMOL/L (ref 3.5–5.1)
POTASSIUM REFLEX MAGNESIUM: 4.2 MMOL/L (ref 3.5–5.1)
POTASSIUM REFLEX MAGNESIUM: 4.2 MMOL/L (ref 3.5–5.1)
POTASSIUM REFLEX MAGNESIUM: 4.3 MMOL/L (ref 3.5–5.1)
POTASSIUM REFLEX MAGNESIUM: 4.3 MMOL/L (ref 3.5–5.1)
POTASSIUM REFLEX MAGNESIUM: 4.6 MMOL/L (ref 3.5–5.1)
POTASSIUM REFLEX MAGNESIUM: 5 MMOL/L (ref 3.5–5.1)
POTASSIUM REFLEX MAGNESIUM: 5 MMOL/L (ref 3.5–5.1)
POTASSIUM REFLEX MAGNESIUM: 5.6 MMOL/L (ref 3.5–5.1)
POTASSIUM SERPL-SCNC: 2.8 MMOL/L (ref 3.5–5.1)
POTASSIUM SERPL-SCNC: 2.8 MMOL/L (ref 3.5–5.1)
POTASSIUM SERPL-SCNC: 2.9 MMOL/L (ref 3.5–5.1)
POTASSIUM SERPL-SCNC: 3.1 MMOL/L (ref 3.5–5.1)
POTASSIUM SERPL-SCNC: 3.2 MMOL/L (ref 3.5–5.1)
POTASSIUM SERPL-SCNC: 3.4 MMOL/L (ref 3.5–5.1)
POTASSIUM SERPL-SCNC: 3.6 MMOL/L (ref 3.5–5.1)
POTASSIUM SERPL-SCNC: 3.7 MMOL/L (ref 3.5–5.1)
POTASSIUM SERPL-SCNC: 3.8 MMOL/L (ref 3.5–5.1)
POTASSIUM SERPL-SCNC: 3.9 MMOL/L (ref 3.5–5.1)
POTASSIUM SERPL-SCNC: 4 MMOL/L (ref 3.5–5.1)
POTASSIUM SERPL-SCNC: 4.1 MMOL/L (ref 3.5–5.1)
POTASSIUM SERPL-SCNC: 4.2 MMOL/L (ref 3.5–5.1)
POTASSIUM SERPL-SCNC: 4.3 MMOL/L (ref 3.5–5.1)
POTASSIUM SERPL-SCNC: 4.4 MMOL/L (ref 3.5–5.1)
POTASSIUM SERPL-SCNC: 4.5 MMOL/L (ref 3.5–5.1)
POTASSIUM SERPL-SCNC: 4.5 MMOL/L (ref 3.5–5.1)
POTASSIUM SERPL-SCNC: 4.6 MMOL/L (ref 3.5–5.1)
POTASSIUM SERPL-SCNC: 4.7 MMOL/L (ref 3.5–5.1)
POTASSIUM SERPL-SCNC: 4.7 MMOL/L (ref 3.5–5.1)
POTASSIUM SERPL-SCNC: 4.8 MMOL/L (ref 3.5–5.1)
POTASSIUM SERPL-SCNC: 4.9 MMOL/L (ref 3.5–5.1)
POTASSIUM SERPL-SCNC: 4.9 MMOL/L (ref 3.5–5.1)
POTASSIUM SERPL-SCNC: 5.3 MMOL/L (ref 3.5–5.1)
PRO-BNP: ABNORMAL PG/ML (ref 0–124)
PRO-BNP: ABNORMAL PG/ML (ref 0–124)
PROTEIN UA: 100 MG/DL
PROTEIN UA: ABNORMAL MG/DL
PROTHROMBIN TIME: 14.3 SEC (ref 9.9–12.7)
PROTHROMBIN TIME: 17 SEC (ref 9.9–12.7)
PROTHROMBIN TIME: 21.9 SEC (ref 9.9–12.7)
PROTHROMBIN TIME: 22.4 SEC (ref 9.9–12.7)
PROTHROMBIN TIME: 29.5 SEC (ref 9.9–12.7)
PROTHROMBIN TIME: 39.3 SEC (ref 9.9–12.7)
RBC # BLD: 3.08 M/UL (ref 4.2–5.9)
RBC # BLD: 3.13 M/UL (ref 4.2–5.9)
RBC # BLD: 3.14 M/UL (ref 4.2–5.9)
RBC # BLD: 3.18 M/UL (ref 4.2–5.9)
RBC # BLD: 3.26 M/UL (ref 4.2–5.9)
RBC # BLD: 3.3 M/UL (ref 4.2–5.9)
RBC # BLD: 3.31 M/UL (ref 4.2–5.9)
RBC # BLD: 3.35 M/UL (ref 4.2–5.9)
RBC # BLD: 3.36 M/UL (ref 4.2–5.9)
RBC # BLD: 3.4 M/UL (ref 4.2–5.9)
RBC # BLD: 3.46 M/UL (ref 4.2–5.9)
RBC # BLD: 3.48 M/UL (ref 4.2–5.9)
RBC # BLD: 3.52 M/UL (ref 4.2–5.9)
RBC # BLD: 3.57 M/UL (ref 4.2–5.9)
RBC # BLD: 3.6 M/UL (ref 4.2–5.9)
RBC # BLD: 3.6 M/UL (ref 4.2–5.9)
RBC # BLD: 3.62 M/UL (ref 4.2–5.9)
RBC # BLD: 3.63 M/UL (ref 4.2–5.9)
RBC # BLD: 3.76 M/UL (ref 4.2–5.9)
RBC # BLD: 3.76 M/UL (ref 4.2–5.9)
RBC # BLD: 3.86 M/UL (ref 4.2–5.9)
RBC # BLD: 3.86 M/UL (ref 4.2–5.9)
RBC # BLD: 3.93 M/UL (ref 4.2–5.9)
RBC # BLD: 3.96 M/UL (ref 4.2–5.9)
RBC # BLD: 4.02 M/UL (ref 4.2–5.9)
RBC # BLD: 4.06 M/UL (ref 4.2–5.9)
RBC # BLD: 4.12 M/UL (ref 4.2–5.9)
RBC # BLD: 4.17 M/UL (ref 4.2–5.9)
RBC # BLD: 4.18 M/UL (ref 4.2–5.9)
RBC # BLD: 4.18 M/UL (ref 4.2–5.9)
RBC # BLD: 4.19 M/UL (ref 4.2–5.9)
RBC # BLD: 4.2 M/UL (ref 4.2–5.9)
RBC # BLD: 4.23 M/UL (ref 4.2–5.9)
RBC # BLD: 4.28 M/UL (ref 4.2–5.9)
RBC # BLD: 4.3 M/UL (ref 4.2–5.9)
RBC # BLD: 4.31 M/UL (ref 4.2–5.9)
RBC # BLD: 4.32 M/UL (ref 4.2–5.9)
RBC # BLD: 4.35 M/UL (ref 4.2–5.9)
RBC # BLD: 4.36 M/UL (ref 4.2–5.9)
RBC # BLD: 4.39 M/UL (ref 4.2–5.9)
RBC # BLD: 4.41 M/UL (ref 4.2–5.9)
RBC # BLD: 4.42 M/UL (ref 4.2–5.9)
RBC # BLD: 4.54 M/UL (ref 4.2–5.9)
RBC # BLD: 4.69 M/UL (ref 4.2–5.9)
RBC # BLD: 4.73 M/UL (ref 4.2–5.9)
RBC # BLD: 4.78 M/UL (ref 4.2–5.9)
RBC # BLD: 4.87 M/UL (ref 4.2–5.9)
RBC # BLD: 4.99 M/UL (ref 4.2–5.9)
RBC # BLD: 5 M/UL (ref 4.2–5.9)
RBC FLUID: 900 /CUMM
RBC UA: ABNORMAL /HPF (ref 0–4)
RBC UA: ABNORMAL /HPF (ref 0–4)
RENAL EPITHELIAL, UA: ABNORMAL /HPF (ref 0–1)
REPORT: NORMAL
SARS-COV-2 RNA, RT PCR: NOT DETECTED
SARS-COV-2 RNA, RT PCR: NOT DETECTED
SLIDE REVIEW: ABNORMAL
SODIUM BLD-SCNC: 126 MMOL/L (ref 136–145)
SODIUM BLD-SCNC: 127 MMOL/L (ref 136–145)
SODIUM BLD-SCNC: 127 MMOL/L (ref 136–145)
SODIUM BLD-SCNC: 128 MMOL/L (ref 136–145)
SODIUM BLD-SCNC: 128 MMOL/L (ref 136–145)
SODIUM BLD-SCNC: 129 MMOL/L (ref 136–145)
SODIUM BLD-SCNC: 130 MMOL/L (ref 136–145)
SODIUM BLD-SCNC: 131 MMOL/L (ref 136–145)
SODIUM BLD-SCNC: 132 MMOL/L (ref 136–145)
SODIUM BLD-SCNC: 133 MMOL/L (ref 136–145)
SODIUM BLD-SCNC: 134 MMOL/L (ref 136–145)
SODIUM BLD-SCNC: 135 MMOL/L (ref 136–145)
SODIUM BLD-SCNC: 136 MMOL/L (ref 136–145)
SODIUM BLD-SCNC: 137 MMOL/L (ref 136–145)
SODIUM BLD-SCNC: 137 MMOL/L (ref 136–145)
SODIUM BLD-SCNC: 139 MMOL/L (ref 136–145)
SPECIFIC GRAVITY UA: 1.01 (ref 1–1.03)
SPECIFIC GRAVITY UA: >=1.03 (ref 1–1.03)
SRA, UNFRACTIONATED HEPARIN INTERPRETATION: NORMAL
SRA, UNFRACTIONATED HEPARIN, HIGH DOSE: 0 %
SRA, UNFRACTIONATED HEPARIN, LOW DOSE: 0 %
T4 FREE: 1.6 NG/DL (ref 0.9–1.8)
TCO2 ARTERIAL: 17.2 MMOL/L
TCO2 ARTERIAL: 19 MMOL/L
TCO2 ARTERIAL: 19 MMOL/L
TCO2 ARTERIAL: 19.8 MMOL/L
TCO2 ARTERIAL: 20.7 MMOL/L
TCO2 ARTERIAL: 20.8 MMOL/L
TCO2 ARTERIAL: 21 MMOL/L
TCO2 ARTERIAL: 22.2 MMOL/L
TCO2 ARTERIAL: 22.4 MMOL/L
TCO2 ARTERIAL: 22.4 MMOL/L
TCO2 ARTERIAL: 22.5 MMOL/L
TCO2 ARTERIAL: 22.7 MMOL/L
TCO2 ARTERIAL: 22.8 MMOL/L
TCO2 ARTERIAL: 23 MMOL/L
TCO2 ARTERIAL: 23.8 MMOL/L
TCO2 ARTERIAL: 23.9 MMOL/L
TCO2 ARTERIAL: 24.2 MMOL/L
TCO2 ARTERIAL: 24.6 MMOL/L
TCO2 ARTERIAL: 24.6 MMOL/L
TCO2 ARTERIAL: 24.7 MMOL/L
TCO2 ARTERIAL: 25 MMOL/L
TCO2 ARTERIAL: 25 MMOL/L
TCO2 ARTERIAL: 25.1 MMOL/L
TCO2 ARTERIAL: 25.3 MMOL/L
TCO2 ARTERIAL: 25.7 MMOL/L
TCO2 ARTERIAL: 25.7 MMOL/L
TCO2 ARTERIAL: 25.8 MMOL/L
TCO2 ARTERIAL: 25.9 MMOL/L
TCO2 CALC VENOUS: 25 MMOL/L
TCO2 CALC VENOUS: 27 MMOL/L
TCO2 CALC VENOUS: 29 MMOL/L
THIS TEST SENT TO: NORMAL
THIS TEST SENT TO: NORMAL
TOTAL PROTEIN: 6.2 G/DL (ref 6.4–8.2)
TOTAL PROTEIN: 7.2 G/DL (ref 6.4–8.2)
TOTAL PROTEIN: 7.3 G/DL (ref 6.4–8.2)
TOTAL PROTEIN: 8 G/DL (ref 6.4–8.2)
TRIGL SERPL-MCNC: 154 MG/DL (ref 0–150)
TROPONIN: 0.03 NG/ML
TROPONIN: 0.05 NG/ML
TROPONIN: 0.21 NG/ML
TROPONIN: 0.21 NG/ML
TROPONIN: 0.22 NG/ML
TROPONIN: 0.24 NG/ML
TROPONIN: 0.27 NG/ML
TSH REFLEX FT4: 5.98 UIU/ML (ref 0.27–4.2)
TSH SERPL DL<=0.05 MIU/L-ACNC: 6.43 UIU/ML (ref 0.27–4.2)
URINE CULTURE, ROUTINE: ABNORMAL
URINE CULTURE, ROUTINE: NORMAL
URINE REFLEX TO CULTURE: YES
URINE TYPE: ABNORMAL
URINE TYPE: ABNORMAL
UROBILINOGEN, URINE: 0.2 E.U./DL
UROBILINOGEN, URINE: 0.2 E.U./DL
VACUOLATED NEUTROPHILS: PRESENT
VANCOMYCIN RANDOM: 12.1 UG/ML
VANCOMYCIN RANDOM: 14.4 UG/ML
VANCOMYCIN RANDOM: 14.7 UG/ML
VANCOMYCIN RANDOM: 15 UG/ML
VANCOMYCIN RANDOM: 15 UG/ML
VANCOMYCIN RANDOM: 15.7 UG/ML
VANCOMYCIN RANDOM: 16.2 UG/ML
VANCOMYCIN RANDOM: 16.8 UG/ML
VANCOMYCIN RANDOM: 17 UG/ML
VANCOMYCIN RANDOM: 17.1 UG/ML
VANCOMYCIN RANDOM: 17.4 UG/ML
VANCOMYCIN RANDOM: 18.8 UG/ML
VANCOMYCIN RANDOM: 19.1 UG/ML
VANCOMYCIN RANDOM: 19.9 UG/ML
VANCOMYCIN RANDOM: 8.5 UG/ML
VLDLC SERPL CALC-MCNC: 31 MG/DL
WBC # BLD: 10.1 K/UL (ref 4–11)
WBC # BLD: 10.1 K/UL (ref 4–11)
WBC # BLD: 10.2 K/UL (ref 4–11)
WBC # BLD: 10.5 K/UL (ref 4–11)
WBC # BLD: 10.8 K/UL (ref 4–11)
WBC # BLD: 10.8 K/UL (ref 4–11)
WBC # BLD: 12 K/UL (ref 4–11)
WBC # BLD: 12.1 K/UL (ref 4–11)
WBC # BLD: 13 K/UL (ref 4–11)
WBC # BLD: 13 K/UL (ref 4–11)
WBC # BLD: 13.3 K/UL (ref 4–11)
WBC # BLD: 14.8 K/UL (ref 4–11)
WBC # BLD: 15.2 K/UL (ref 4–11)
WBC # BLD: 17.9 K/UL (ref 4–11)
WBC # BLD: 18.6 K/UL (ref 4–11)
WBC # BLD: 18.9 K/UL (ref 4–11)
WBC # BLD: 19.3 K/UL (ref 4–11)
WBC # BLD: 19.4 K/UL (ref 4–11)
WBC # BLD: 19.7 K/UL (ref 4–11)
WBC # BLD: 20.5 K/UL (ref 4–11)
WBC # BLD: 23.7 K/UL (ref 4–11)
WBC # BLD: 24 K/UL (ref 4–11)
WBC # BLD: 26.2 K/UL (ref 4–11)
WBC # BLD: 28.6 K/UL (ref 4–11)
WBC # BLD: 31.2 K/UL (ref 4–11)
WBC # BLD: 33.1 K/UL (ref 4–11)
WBC # BLD: 35.7 K/UL (ref 4–11)
WBC # BLD: 5.6 K/UL (ref 4–11)
WBC # BLD: 5.7 K/UL (ref 4–11)
WBC # BLD: 5.8 K/UL (ref 4–11)
WBC # BLD: 6 K/UL (ref 4–11)
WBC # BLD: 6.5 K/UL (ref 4–11)
WBC # BLD: 6.6 K/UL (ref 4–11)
WBC # BLD: 6.7 K/UL (ref 4–11)
WBC # BLD: 6.8 K/UL (ref 4–11)
WBC # BLD: 6.9 K/UL (ref 4–11)
WBC # BLD: 6.9 K/UL (ref 4–11)
WBC # BLD: 7 K/UL (ref 4–11)
WBC # BLD: 7.1 K/UL (ref 4–11)
WBC # BLD: 7.1 K/UL (ref 4–11)
WBC # BLD: 7.2 K/UL (ref 4–11)
WBC # BLD: 7.5 K/UL (ref 4–11)
WBC # BLD: 7.7 K/UL (ref 4–11)
WBC # BLD: 7.7 K/UL (ref 4–11)
WBC # BLD: 7.8 K/UL (ref 4–11)
WBC # BLD: 7.9 K/UL (ref 4–11)
WBC # BLD: 8.1 K/UL (ref 4–11)
WBC # BLD: 8.2 K/UL (ref 4–11)
WBC # BLD: 8.8 K/UL (ref 4–11)
WBC UA: ABNORMAL /HPF (ref 0–5)
WBC UA: ABNORMAL /HPF (ref 0–5)

## 2022-01-01 PROCEDURE — 84484 ASSAY OF TROPONIN QUANT: CPT

## 2022-01-01 PROCEDURE — 2700000000 HC OXYGEN THERAPY PER DAY

## 2022-01-01 PROCEDURE — 2500000003 HC RX 250 WO HCPCS: Performed by: PHYSICIAN ASSISTANT

## 2022-01-01 PROCEDURE — 99291 CRITICAL CARE FIRST HOUR: CPT | Performed by: INTERNAL MEDICINE

## 2022-01-01 PROCEDURE — 82330 ASSAY OF CALCIUM: CPT

## 2022-01-01 PROCEDURE — 6360000002 HC RX W HCPCS: Performed by: INTERNAL MEDICINE

## 2022-01-01 PROCEDURE — 93010 ELECTROCARDIOGRAM REPORT: CPT | Performed by: INTERNAL MEDICINE

## 2022-01-01 PROCEDURE — 83735 ASSAY OF MAGNESIUM: CPT

## 2022-01-01 PROCEDURE — 99213 OFFICE O/P EST LOW 20 MIN: CPT | Performed by: INTERNAL MEDICINE

## 2022-01-01 PROCEDURE — 36592 COLLECT BLOOD FROM PICC: CPT

## 2022-01-01 PROCEDURE — 6370000000 HC RX 637 (ALT 250 FOR IP): Performed by: INTERNAL MEDICINE

## 2022-01-01 PROCEDURE — 94003 VENT MGMT INPAT SUBQ DAY: CPT

## 2022-01-01 PROCEDURE — 36591 DRAW BLOOD OFF VENOUS DEVICE: CPT

## 2022-01-01 PROCEDURE — 2000000000 HC ICU R&B

## 2022-01-01 PROCEDURE — 82803 BLOOD GASES ANY COMBINATION: CPT

## 2022-01-01 PROCEDURE — 94660 CPAP INITIATION&MGMT: CPT

## 2022-01-01 PROCEDURE — 87186 SC STD MICRODIL/AGAR DIL: CPT

## 2022-01-01 PROCEDURE — 87205 SMEAR GRAM STAIN: CPT

## 2022-01-01 PROCEDURE — 94640 AIRWAY INHALATION TREATMENT: CPT

## 2022-01-01 PROCEDURE — 80069 RENAL FUNCTION PANEL: CPT

## 2022-01-01 PROCEDURE — 85025 COMPLETE CBC W/AUTO DIFF WBC: CPT

## 2022-01-01 PROCEDURE — 94761 N-INVAS EAR/PLS OXIMETRY MLT: CPT

## 2022-01-01 PROCEDURE — 87636 SARSCOV2 & INF A&B AMP PRB: CPT

## 2022-01-01 PROCEDURE — 99233 SBSQ HOSP IP/OBS HIGH 50: CPT | Performed by: INTERNAL MEDICINE

## 2022-01-01 PROCEDURE — C9113 INJ PANTOPRAZOLE SODIUM, VIA: HCPCS | Performed by: INTERNAL MEDICINE

## 2022-01-01 PROCEDURE — 97166 OT EVAL MOD COMPLEX 45 MIN: CPT

## 2022-01-01 PROCEDURE — 17250 CHEM CAUT OF GRANLTJ TISSUE: CPT

## 2022-01-01 PROCEDURE — 2580000003 HC RX 258: Performed by: INTERNAL MEDICINE

## 2022-01-01 PROCEDURE — 2500000003 HC RX 250 WO HCPCS: Performed by: INTERNAL MEDICINE

## 2022-01-01 PROCEDURE — 82040 ASSAY OF SERUM ALBUMIN: CPT

## 2022-01-01 PROCEDURE — 84100 ASSAY OF PHOSPHORUS: CPT

## 2022-01-01 PROCEDURE — 83880 ASSAY OF NATRIURETIC PEPTIDE: CPT

## 2022-01-01 PROCEDURE — 36600 WITHDRAWAL OF ARTERIAL BLOOD: CPT

## 2022-01-01 PROCEDURE — 93000 ELECTROCARDIOGRAM COMPLETE: CPT | Performed by: INTERNAL MEDICINE

## 2022-01-01 PROCEDURE — 90945 DIALYSIS ONE EVALUATION: CPT

## 2022-01-01 PROCEDURE — 96367 TX/PROPH/DG ADDL SEQ IV INF: CPT

## 2022-01-01 PROCEDURE — 94669 MECHANICAL CHEST WALL OSCILL: CPT

## 2022-01-01 PROCEDURE — 93308 TTE F-UP OR LMTD: CPT

## 2022-01-01 PROCEDURE — 80202 ASSAY OF VANCOMYCIN: CPT

## 2022-01-01 PROCEDURE — 1036F TOBACCO NON-USER: CPT | Performed by: INTERNAL MEDICINE

## 2022-01-01 PROCEDURE — 71260 CT THORAX DX C+: CPT

## 2022-01-01 PROCEDURE — 99285 EMERGENCY DEPT VISIT HI MDM: CPT

## 2022-01-01 PROCEDURE — 5A1955Z RESPIRATORY VENTILATION, GREATER THAN 96 CONSECUTIVE HOURS: ICD-10-PCS | Performed by: INTERNAL MEDICINE

## 2022-01-01 PROCEDURE — 85027 COMPLETE CBC AUTOMATED: CPT

## 2022-01-01 PROCEDURE — 85520 HEPARIN ASSAY: CPT

## 2022-01-01 PROCEDURE — 81001 URINALYSIS AUTO W/SCOPE: CPT

## 2022-01-01 PROCEDURE — 87015 SPECIMEN INFECT AGNT CONCNTJ: CPT

## 2022-01-01 PROCEDURE — 87070 CULTURE OTHR SPECIMN AEROBIC: CPT

## 2022-01-01 PROCEDURE — 82533 TOTAL CORTISOL: CPT

## 2022-01-01 PROCEDURE — 36415 COLL VENOUS BLD VENIPUNCTURE: CPT

## 2022-01-01 PROCEDURE — 87340 HEPATITIS B SURFACE AG IA: CPT

## 2022-01-01 PROCEDURE — 31624 DX BRONCHOSCOPE/LAVAGE: CPT | Performed by: INTERNAL MEDICINE

## 2022-01-01 PROCEDURE — 2060000000 HC ICU INTERMEDIATE R&B

## 2022-01-01 PROCEDURE — 97110 THERAPEUTIC EXERCISES: CPT

## 2022-01-01 PROCEDURE — 11046 DBRDMT MUSC&/FSCA EA ADDL: CPT

## 2022-01-01 PROCEDURE — 71045 X-RAY EXAM CHEST 1 VIEW: CPT

## 2022-01-01 PROCEDURE — 87088 URINE BACTERIA CULTURE: CPT

## 2022-01-01 PROCEDURE — 99238 HOSP IP/OBS DSCHRG MGMT 30/<: CPT | Performed by: INTERNAL MEDICINE

## 2022-01-01 PROCEDURE — 2580000003 HC RX 258: Performed by: PHYSICIAN ASSISTANT

## 2022-01-01 PROCEDURE — 80048 BASIC METABOLIC PNL TOTAL CA: CPT

## 2022-01-01 PROCEDURE — 93005 ELECTROCARDIOGRAM TRACING: CPT | Performed by: INTERNAL MEDICINE

## 2022-01-01 PROCEDURE — 87086 URINE CULTURE/COLONY COUNT: CPT

## 2022-01-01 PROCEDURE — 0B110F4 BYPASS TRACHEA TO CUTANEOUS WITH TRACHEOSTOMY DEVICE, OPEN APPROACH: ICD-10-PCS | Performed by: STUDENT IN AN ORGANIZED HEALTH CARE EDUCATION/TRAINING PROGRAM

## 2022-01-01 PROCEDURE — 86704 HEP B CORE ANTIBODY TOTAL: CPT

## 2022-01-01 PROCEDURE — G8417 CALC BMI ABV UP PARAM F/U: HCPCS | Performed by: INTERNAL MEDICINE

## 2022-01-01 PROCEDURE — 36558 INSERT TUNNELED CV CATH: CPT

## 2022-01-01 PROCEDURE — 3017F COLORECTAL CA SCREEN DOC REV: CPT | Performed by: INTERNAL MEDICINE

## 2022-01-01 PROCEDURE — 6370000000 HC RX 637 (ALT 250 FOR IP): Performed by: PHYSICIAN ASSISTANT

## 2022-01-01 PROCEDURE — 83036 HEMOGLOBIN GLYCOSYLATED A1C: CPT

## 2022-01-01 PROCEDURE — 6360000004 HC RX CONTRAST MEDICATION: Performed by: INTERNAL MEDICINE

## 2022-01-01 PROCEDURE — 87077 CULTURE AEROBIC IDENTIFY: CPT

## 2022-01-01 PROCEDURE — 6360000002 HC RX W HCPCS: Performed by: PHYSICIAN ASSISTANT

## 2022-01-01 PROCEDURE — 87040 BLOOD CULTURE FOR BACTERIA: CPT

## 2022-01-01 PROCEDURE — 6360000002 HC RX W HCPCS

## 2022-01-01 PROCEDURE — 85610 PROTHROMBIN TIME: CPT

## 2022-01-01 PROCEDURE — 84132 ASSAY OF SERUM POTASSIUM: CPT

## 2022-01-01 PROCEDURE — 99231 SBSQ HOSP IP/OBS SF/LOW 25: CPT | Performed by: STUDENT IN AN ORGANIZED HEALTH CARE EDUCATION/TRAINING PROGRAM

## 2022-01-01 PROCEDURE — C1750 CATH, HEMODIALYSIS,LONG-TERM: HCPCS

## 2022-01-01 PROCEDURE — 85730 THROMBOPLASTIN TIME PARTIAL: CPT

## 2022-01-01 PROCEDURE — 92526 ORAL FUNCTION THERAPY: CPT

## 2022-01-01 PROCEDURE — G8484 FLU IMMUNIZE NO ADMIN: HCPCS | Performed by: INTERNAL MEDICINE

## 2022-01-01 PROCEDURE — 2580000003 HC RX 258

## 2022-01-01 PROCEDURE — 99232 SBSQ HOSP IP/OBS MODERATE 35: CPT | Performed by: INTERNAL MEDICINE

## 2022-01-01 PROCEDURE — 80061 LIPID PANEL: CPT

## 2022-01-01 PROCEDURE — 1111F DSCHRG MED/CURRENT MED MERGE: CPT | Performed by: INTERNAL MEDICINE

## 2022-01-01 PROCEDURE — 05HM33Z INSERTION OF INFUSION DEVICE INTO RIGHT INTERNAL JUGULAR VEIN, PERCUTANEOUS APPROACH: ICD-10-PCS | Performed by: RADIOLOGY

## 2022-01-01 PROCEDURE — 3609013300 HC EGD TUBE PLACEMENT: Performed by: INTERNAL MEDICINE

## 2022-01-01 PROCEDURE — 31500 INSERT EMERGENCY AIRWAY: CPT

## 2022-01-01 PROCEDURE — 99214 OFFICE O/P EST MOD 30 MIN: CPT | Performed by: INTERNAL MEDICINE

## 2022-01-01 PROCEDURE — 97164 PT RE-EVAL EST PLAN CARE: CPT

## 2022-01-01 PROCEDURE — 99233 SBSQ HOSP IP/OBS HIGH 50: CPT | Performed by: NURSE PRACTITIONER

## 2022-01-01 PROCEDURE — 80053 COMPREHEN METABOLIC PANEL: CPT

## 2022-01-01 PROCEDURE — 86022 PLATELET ANTIBODIES: CPT

## 2022-01-01 PROCEDURE — 92612 ENDOSCOPY SWALLOW (FEES) VID: CPT

## 2022-01-01 PROCEDURE — 0BH18EZ INSERTION OF ENDOTRACHEAL AIRWAY INTO TRACHEA, VIA NATURAL OR ARTIFICIAL OPENING ENDOSCOPIC: ICD-10-PCS | Performed by: INTERNAL MEDICINE

## 2022-01-01 PROCEDURE — 96368 THER/DIAG CONCURRENT INF: CPT

## 2022-01-01 PROCEDURE — 84443 ASSAY THYROID STIM HORMONE: CPT

## 2022-01-01 PROCEDURE — G8427 DOCREV CUR MEDS BY ELIG CLIN: HCPCS | Performed by: INTERNAL MEDICINE

## 2022-01-01 PROCEDURE — 31500 INSERT EMERGENCY AIRWAY: CPT | Performed by: INTERNAL MEDICINE

## 2022-01-01 PROCEDURE — 82947 ASSAY GLUCOSE BLOOD QUANT: CPT

## 2022-01-01 PROCEDURE — 5A1D70Z PERFORMANCE OF URINARY FILTRATION, INTERMITTENT, LESS THAN 6 HOURS PER DAY: ICD-10-PCS | Performed by: INTERNAL MEDICINE

## 2022-01-01 PROCEDURE — 17250 CHEM CAUT OF GRANLTJ TISSUE: CPT | Performed by: INTERNAL MEDICINE

## 2022-01-01 PROCEDURE — C1894 INTRO/SHEATH, NON-LASER: HCPCS

## 2022-01-01 PROCEDURE — 0B9D8ZX DRAINAGE OF RIGHT MIDDLE LUNG LOBE, VIA NATURAL OR ARTIFICIAL OPENING ENDOSCOPIC, DIAGNOSTIC: ICD-10-PCS | Performed by: INTERNAL MEDICINE

## 2022-01-01 PROCEDURE — 87150 DNA/RNA AMPLIFIED PROBE: CPT

## 2022-01-01 PROCEDURE — 97530 THERAPEUTIC ACTIVITIES: CPT

## 2022-01-01 PROCEDURE — 6360000004 HC RX CONTRAST MEDICATION: Performed by: PHYSICIAN ASSISTANT

## 2022-01-01 PROCEDURE — 90935 HEMODIALYSIS ONE EVALUATION: CPT

## 2022-01-01 PROCEDURE — 99223 1ST HOSP IP/OBS HIGH 75: CPT | Performed by: INTERNAL MEDICINE

## 2022-01-01 PROCEDURE — P9047 ALBUMIN (HUMAN), 25%, 50ML: HCPCS | Performed by: INTERNAL MEDICINE

## 2022-01-01 PROCEDURE — 31645 BRNCHSC W/THER ASPIR 1ST: CPT | Performed by: INTERNAL MEDICINE

## 2022-01-01 PROCEDURE — 92610 EVALUATE SWALLOWING FUNCTION: CPT

## 2022-01-01 PROCEDURE — 49083 ABD PARACENTESIS W/IMAGING: CPT

## 2022-01-01 PROCEDURE — 89051 BODY FLUID CELL COUNT: CPT

## 2022-01-01 PROCEDURE — 11043 DBRDMT MUSC&/FSCA 1ST 20/<: CPT | Performed by: INTERNAL MEDICINE

## 2022-01-01 PROCEDURE — 93005 ELECTROCARDIOGRAM TRACING: CPT | Performed by: PHYSICIAN ASSISTANT

## 2022-01-01 PROCEDURE — 87106 FUNGI IDENTIFICATION YEAST: CPT

## 2022-01-01 PROCEDURE — 6370000000 HC RX 637 (ALT 250 FOR IP)

## 2022-01-01 PROCEDURE — 99152 MOD SED SAME PHYS/QHP 5/>YRS: CPT | Performed by: INTERNAL MEDICINE

## 2022-01-01 PROCEDURE — 83605 ASSAY OF LACTIC ACID: CPT

## 2022-01-01 PROCEDURE — 99212 OFFICE O/P EST SF 10 MIN: CPT | Performed by: INTERNAL MEDICINE

## 2022-01-01 PROCEDURE — 80076 HEPATIC FUNCTION PANEL: CPT

## 2022-01-01 PROCEDURE — 87102 FUNGUS ISOLATION CULTURE: CPT

## 2022-01-01 PROCEDURE — 31622 DX BRONCHOSCOPE/WASH: CPT

## 2022-01-01 PROCEDURE — 88112 CYTOPATH CELL ENHANCE TECH: CPT

## 2022-01-01 PROCEDURE — 0JH63XZ INSERTION OF TUNNELED VASCULAR ACCESS DEVICE INTO CHEST SUBCUTANEOUS TISSUE AND FASCIA, PERCUTANEOUS APPROACH: ICD-10-PCS | Performed by: RADIOLOGY

## 2022-01-01 PROCEDURE — 96365 THER/PROPH/DIAG IV INF INIT: CPT

## 2022-01-01 PROCEDURE — 77001 FLUOROGUIDE FOR VEIN DEVICE: CPT

## 2022-01-01 PROCEDURE — 31720 CLEARANCE OF AIRWAYS: CPT

## 2022-01-01 PROCEDURE — 5A1D90Z PERFORMANCE OF URINARY FILTRATION, CONTINUOUS, GREATER THAN 18 HOURS PER DAY: ICD-10-PCS | Performed by: INTERNAL MEDICINE

## 2022-01-01 PROCEDURE — 3600000002 HC SURGERY LEVEL 2 BASE: Performed by: STUDENT IN AN ORGANIZED HEALTH CARE EDUCATION/TRAINING PROGRAM

## 2022-01-01 PROCEDURE — 99232 SBSQ HOSP IP/OBS MODERATE 35: CPT | Performed by: OTOLARYNGOLOGY

## 2022-01-01 PROCEDURE — 6360000002 HC RX W HCPCS: Performed by: RADIOLOGY

## 2022-01-01 PROCEDURE — 0BC68ZZ EXTIRPATION OF MATTER FROM RIGHT LOWER LOBE BRONCHUS, VIA NATURAL OR ARTIFICIAL OPENING ENDOSCOPIC: ICD-10-PCS | Performed by: INTERNAL MEDICINE

## 2022-01-01 PROCEDURE — C1751 CATH, INF, PER/CENT/MIDLINE: HCPCS

## 2022-01-01 PROCEDURE — 92950 HEART/LUNG RESUSCITATION CPR: CPT

## 2022-01-01 PROCEDURE — 02H633Z INSERTION OF INFUSION DEVICE INTO RIGHT ATRIUM, PERCUTANEOUS APPROACH: ICD-10-PCS | Performed by: RADIOLOGY

## 2022-01-01 PROCEDURE — 3700000001 HC ADD 15 MINUTES (ANESTHESIA): Performed by: STUDENT IN AN ORGANIZED HEALTH CARE EDUCATION/TRAINING PROGRAM

## 2022-01-01 PROCEDURE — 2500000003 HC RX 250 WO HCPCS

## 2022-01-01 PROCEDURE — 02HV33Z INSERTION OF INFUSION DEVICE INTO SUPERIOR VENA CAVA, PERCUTANEOUS APPROACH: ICD-10-PCS | Performed by: INTERNAL MEDICINE

## 2022-01-01 PROCEDURE — 87206 SMEAR FLUORESCENT/ACID STAI: CPT

## 2022-01-01 PROCEDURE — 3700000000 HC ANESTHESIA ATTENDED CARE: Performed by: STUDENT IN AN ORGANIZED HEALTH CARE EDUCATION/TRAINING PROGRAM

## 2022-01-01 PROCEDURE — 0BCB8ZZ EXTIRPATION OF MATTER FROM LEFT LOWER LOBE BRONCHUS, VIA NATURAL OR ARTIFICIAL OPENING ENDOSCOPIC: ICD-10-PCS | Performed by: INTERNAL MEDICINE

## 2022-01-01 PROCEDURE — 2580000003 HC RX 258: Performed by: STUDENT IN AN ORGANIZED HEALTH CARE EDUCATION/TRAINING PROGRAM

## 2022-01-01 PROCEDURE — 99214 OFFICE O/P EST MOD 30 MIN: CPT

## 2022-01-01 PROCEDURE — 2500000003 HC RX 250 WO HCPCS: Performed by: STUDENT IN AN ORGANIZED HEALTH CARE EDUCATION/TRAINING PROGRAM

## 2022-01-01 PROCEDURE — 36556 INSERT NON-TUNNEL CV CATH: CPT

## 2022-01-01 PROCEDURE — 94002 VENT MGMT INPAT INIT DAY: CPT

## 2022-01-01 PROCEDURE — 97597 DBRDMT OPN WND 1ST 20 CM/<: CPT

## 2022-01-01 PROCEDURE — 97162 PT EVAL MOD COMPLEX 30 MIN: CPT

## 2022-01-01 PROCEDURE — 93306 TTE W/DOPPLER COMPLETE: CPT

## 2022-01-01 PROCEDURE — 93970 EXTREMITY STUDY: CPT

## 2022-01-01 PROCEDURE — 88305 TISSUE EXAM BY PATHOLOGIST: CPT

## 2022-01-01 PROCEDURE — 2709999900 HC NON-CHARGEABLE SUPPLY: Performed by: INTERNAL MEDICINE

## 2022-01-01 PROCEDURE — 76937 US GUIDE VASCULAR ACCESS: CPT

## 2022-01-01 PROCEDURE — 84439 ASSAY OF FREE THYROXINE: CPT

## 2022-01-01 PROCEDURE — 74177 CT ABD & PELVIS W/CONTRAST: CPT

## 2022-01-01 PROCEDURE — 31600 PLANNED TRACHEOSTOMY: CPT | Performed by: STUDENT IN AN ORGANIZED HEALTH CARE EDUCATION/TRAINING PROGRAM

## 2022-01-01 PROCEDURE — 0DH63UZ INSERTION OF FEEDING DEVICE INTO STOMACH, PERCUTANEOUS APPROACH: ICD-10-PCS | Performed by: INTERNAL MEDICINE

## 2022-01-01 PROCEDURE — 2709999900 HC NON-CHARGEABLE SUPPLY: Performed by: STUDENT IN AN ORGANIZED HEALTH CARE EDUCATION/TRAINING PROGRAM

## 2022-01-01 PROCEDURE — 3600000012 HC SURGERY LEVEL 2 ADDTL 15MIN: Performed by: STUDENT IN AN ORGANIZED HEALTH CARE EDUCATION/TRAINING PROGRAM

## 2022-01-01 PROCEDURE — 86706 HEP B SURFACE ANTIBODY: CPT

## 2022-01-01 PROCEDURE — 0BC58ZZ EXTIRPATION OF MATTER FROM RIGHT MIDDLE LOBE BRONCHUS, VIA NATURAL OR ARTIFICIAL OPENING ENDOSCOPIC: ICD-10-PCS | Performed by: INTERNAL MEDICINE

## 2022-01-01 PROCEDURE — 87116 MYCOBACTERIA CULTURE: CPT

## 2022-01-01 RX ORDER — MIDAZOLAM HYDROCHLORIDE 1 MG/ML
2 INJECTION INTRAMUSCULAR; INTRAVENOUS
Status: DISCONTINUED | OUTPATIENT
Start: 2022-01-01 | End: 2022-01-01 | Stop reason: HOSPADM

## 2022-01-01 RX ORDER — MAGNESIUM OXIDE 400 MG/1
TABLET ORAL
Qty: 270 TABLET | Refills: 0 | Status: SHIPPED | OUTPATIENT
Start: 2022-01-01 | End: 2022-01-01

## 2022-01-01 RX ORDER — DEXTROSE MONOHYDRATE 50 MG/ML
100 INJECTION, SOLUTION INTRAVENOUS PRN
Status: DISCONTINUED | OUTPATIENT
Start: 2022-01-01 | End: 2022-01-01

## 2022-01-01 RX ORDER — LIDOCAINE 40 MG/G
CREAM TOPICAL ONCE
Status: CANCELLED | OUTPATIENT
Start: 2022-01-01 | End: 2022-01-01

## 2022-01-01 RX ORDER — INSULIN GLARGINE 100 [IU]/ML
15 INJECTION, SOLUTION SUBCUTANEOUS NIGHTLY
Status: DISCONTINUED | OUTPATIENT
Start: 2022-01-01 | End: 2022-01-01 | Stop reason: HOSPADM

## 2022-01-01 RX ORDER — HEPARIN SODIUM 1000 [USP'U]/ML
4000 INJECTION, SOLUTION INTRAVENOUS; SUBCUTANEOUS ONCE
Status: COMPLETED | OUTPATIENT
Start: 2022-01-01 | End: 2022-01-01

## 2022-01-01 RX ORDER — DEXTROSE MONOHYDRATE 25 G/50ML
12.5 INJECTION, SOLUTION INTRAVENOUS PRN
Status: DISCONTINUED | OUTPATIENT
Start: 2022-01-01 | End: 2022-01-01

## 2022-01-01 RX ORDER — FUROSEMIDE 10 MG/ML
80 INJECTION INTRAMUSCULAR; INTRAVENOUS ONCE
Status: COMPLETED | OUTPATIENT
Start: 2022-01-01 | End: 2022-01-01

## 2022-01-01 RX ORDER — POTASSIUM CHLORIDE 29.8 MG/ML
20 INJECTION INTRAVENOUS PRN
Status: DISCONTINUED | OUTPATIENT
Start: 2022-01-01 | End: 2022-01-01 | Stop reason: HOSPADM

## 2022-01-01 RX ORDER — INSULIN GLARGINE 100 [IU]/ML
55 INJECTION, SOLUTION SUBCUTANEOUS EVERY MORNING
Status: DISCONTINUED | OUTPATIENT
Start: 2022-01-01 | End: 2022-01-01

## 2022-01-01 RX ORDER — MONTELUKAST SODIUM 10 MG/1
10 TABLET ORAL DAILY
Status: DISCONTINUED | OUTPATIENT
Start: 2022-01-01 | End: 2022-01-01 | Stop reason: HOSPADM

## 2022-01-01 RX ORDER — SENNA AND DOCUSATE SODIUM 50; 8.6 MG/1; MG/1
2 TABLET, FILM COATED ORAL 2 TIMES DAILY
Status: DISCONTINUED | OUTPATIENT
Start: 2022-01-01 | End: 2022-01-01 | Stop reason: HOSPADM

## 2022-01-01 RX ORDER — CETIRIZINE HYDROCHLORIDE 10 MG/1
10 TABLET ORAL DAILY
Status: DISCONTINUED | OUTPATIENT
Start: 2022-01-01 | End: 2022-01-01 | Stop reason: HOSPADM

## 2022-01-01 RX ORDER — HEPARIN SODIUM 1000 [USP'U]/ML
2000 INJECTION, SOLUTION INTRAVENOUS; SUBCUTANEOUS ONCE
Status: COMPLETED | OUTPATIENT
Start: 2022-01-01 | End: 2022-01-01

## 2022-01-01 RX ORDER — TORSEMIDE 100 MG/1
100 TABLET ORAL DAILY
Status: DISCONTINUED | OUTPATIENT
Start: 2022-01-01 | End: 2022-01-01 | Stop reason: HOSPADM

## 2022-01-01 RX ORDER — ERGOCALCIFEROL 1.25 MG/1
50000 CAPSULE ORAL WEEKLY
COMMUNITY

## 2022-01-01 RX ORDER — DEXTROSE AND SODIUM CHLORIDE 5; .45 G/100ML; G/100ML
INJECTION, SOLUTION INTRAVENOUS CONTINUOUS
Status: DISCONTINUED | OUTPATIENT
Start: 2022-01-01 | End: 2022-01-01

## 2022-01-01 RX ORDER — INSULIN GLARGINE 100 [IU]/ML
30 INJECTION, SOLUTION SUBCUTANEOUS EVERY MORNING
Status: DISCONTINUED | OUTPATIENT
Start: 2022-01-01 | End: 2022-01-01

## 2022-01-01 RX ORDER — IPRATROPIUM BROMIDE AND ALBUTEROL SULFATE 2.5; .5 MG/3ML; MG/3ML
1 SOLUTION RESPIRATORY (INHALATION) EVERY 4 HOURS PRN
Status: DISCONTINUED | OUTPATIENT
Start: 2022-01-01 | End: 2022-01-01 | Stop reason: HOSPADM

## 2022-01-01 RX ORDER — LIDOCAINE HYDROCHLORIDE 40 MG/ML
SOLUTION TOPICAL ONCE
Status: CANCELLED | OUTPATIENT
Start: 2022-01-01 | End: 2022-01-01

## 2022-01-01 RX ORDER — INSULIN LISPRO 100 [IU]/ML
0-18 INJECTION, SOLUTION INTRAVENOUS; SUBCUTANEOUS EVERY 4 HOURS
Status: DISCONTINUED | OUTPATIENT
Start: 2022-01-01 | End: 2022-01-01

## 2022-01-01 RX ORDER — IPRATROPIUM BROMIDE AND ALBUTEROL SULFATE 2.5; .5 MG/3ML; MG/3ML
1 SOLUTION RESPIRATORY (INHALATION) EVERY 4 HOURS
Status: DISCONTINUED | OUTPATIENT
Start: 2022-01-01 | End: 2022-01-01 | Stop reason: HOSPADM

## 2022-01-01 RX ORDER — LIDOCAINE HYDROCHLORIDE 40 MG/ML
SOLUTION TOPICAL
Status: COMPLETED
Start: 2022-01-01 | End: 2022-01-01

## 2022-01-01 RX ORDER — METOPROLOL SUCCINATE 25 MG/1
25 TABLET, EXTENDED RELEASE ORAL DAILY
Qty: 90 TABLET | Refills: 3 | Status: SHIPPED | OUTPATIENT
Start: 2022-01-01 | End: 2022-01-01

## 2022-01-01 RX ORDER — HEPARIN SODIUM 1000 [USP'U]/ML
4000 INJECTION, SOLUTION INTRAVENOUS; SUBCUTANEOUS PRN
Status: DISCONTINUED | OUTPATIENT
Start: 2022-01-01 | End: 2022-01-01

## 2022-01-01 RX ORDER — ALBUMIN (HUMAN) 12.5 G/50ML
25 SOLUTION INTRAVENOUS EVERY 8 HOURS
Status: COMPLETED | OUTPATIENT
Start: 2022-01-01 | End: 2022-01-01

## 2022-01-01 RX ORDER — DEXTROSE MONOHYDRATE 50 MG/ML
100 INJECTION, SOLUTION INTRAVENOUS PRN
Status: DISCONTINUED | OUTPATIENT
Start: 2022-01-01 | End: 2022-01-01 | Stop reason: HOSPADM

## 2022-01-01 RX ORDER — LIDOCAINE 40 MG/G
CREAM TOPICAL ONCE
Status: DISCONTINUED | OUTPATIENT
Start: 2022-01-01 | End: 2022-01-01 | Stop reason: HOSPADM

## 2022-01-01 RX ORDER — TRAZODONE HYDROCHLORIDE 50 MG/1
50 TABLET ORAL NIGHTLY PRN
Status: DISCONTINUED | OUTPATIENT
Start: 2022-01-01 | End: 2022-01-01 | Stop reason: HOSPADM

## 2022-01-01 RX ORDER — PANTOPRAZOLE SODIUM 40 MG/10ML
40 INJECTION, POWDER, LYOPHILIZED, FOR SOLUTION INTRAVENOUS DAILY
Status: DISCONTINUED | OUTPATIENT
Start: 2022-01-01 | End: 2022-01-01 | Stop reason: HOSPADM

## 2022-01-01 RX ORDER — OXYCODONE AND ACETAMINOPHEN 7.5; 325 MG/1; MG/1
1 TABLET ORAL EVERY 6 HOURS PRN
Status: DISCONTINUED | OUTPATIENT
Start: 2022-01-01 | End: 2022-01-01

## 2022-01-01 RX ORDER — INSULIN LISPRO 100 [IU]/ML
0-12 INJECTION, SOLUTION INTRAVENOUS; SUBCUTANEOUS EVERY 4 HOURS
Status: DISCONTINUED | OUTPATIENT
Start: 2022-01-01 | End: 2022-01-01

## 2022-01-01 RX ORDER — SODIUM CHLORIDE 9 MG/ML
25 INJECTION, SOLUTION INTRAVENOUS PRN
Status: DISCONTINUED | OUTPATIENT
Start: 2022-01-01 | End: 2022-01-01 | Stop reason: HOSPADM

## 2022-01-01 RX ORDER — SODIUM CHLORIDE 9 MG/ML
INJECTION, SOLUTION INTRAVENOUS CONTINUOUS PRN
Status: COMPLETED | OUTPATIENT
Start: 2022-01-01 | End: 2022-01-01

## 2022-01-01 RX ORDER — MONTELUKAST SODIUM 10 MG/1
TABLET ORAL
Qty: 60 TABLET | Refills: 5 | Status: SHIPPED | OUTPATIENT
Start: 2022-01-01

## 2022-01-01 RX ORDER — FENTANYL CITRATE 50 UG/ML
INJECTION, SOLUTION INTRAMUSCULAR; INTRAVENOUS
Status: COMPLETED | OUTPATIENT
Start: 2022-01-01 | End: 2022-01-01

## 2022-01-01 RX ORDER — SPIRONOLACTONE 25 MG/1
50 TABLET ORAL DAILY
Status: DISCONTINUED | OUTPATIENT
Start: 2022-01-01 | End: 2022-01-01

## 2022-01-01 RX ORDER — INSULIN LISPRO 100 [IU]/ML
7 INJECTION, SOLUTION INTRAVENOUS; SUBCUTANEOUS
Status: DISCONTINUED | OUTPATIENT
Start: 2022-01-01 | End: 2022-01-01

## 2022-01-01 RX ORDER — PROPOFOL 10 MG/ML
5-50 INJECTION, EMULSION INTRAVENOUS CONTINUOUS
Status: DISCONTINUED | OUTPATIENT
Start: 2022-01-01 | End: 2022-01-01

## 2022-01-01 RX ORDER — METOPROLOL SUCCINATE 25 MG/1
25 TABLET, EXTENDED RELEASE ORAL DAILY
Qty: 30 TABLET | Refills: 2 | Status: SHIPPED | OUTPATIENT
Start: 2022-01-01 | End: 2022-01-01 | Stop reason: SDUPTHER

## 2022-01-01 RX ORDER — OXYCODONE HYDROCHLORIDE AND ACETAMINOPHEN 5; 325 MG/1; MG/1
1 TABLET ORAL EVERY 6 HOURS PRN
Status: DISCONTINUED | OUTPATIENT
Start: 2022-01-01 | End: 2022-01-01

## 2022-01-01 RX ORDER — ACETAMINOPHEN 325 MG/1
650 TABLET ORAL EVERY 6 HOURS PRN
Status: DISCONTINUED | OUTPATIENT
Start: 2022-01-01 | End: 2022-01-01 | Stop reason: HOSPADM

## 2022-01-01 RX ORDER — EPINEPHRINE 0.1 MG/ML
SYRINGE (ML) INJECTION
Status: COMPLETED | OUTPATIENT
Start: 2022-01-01 | End: 2022-01-01

## 2022-01-01 RX ORDER — METOPROLOL SUCCINATE 25 MG/1
25 TABLET, EXTENDED RELEASE ORAL 2 TIMES DAILY
Status: DISCONTINUED | OUTPATIENT
Start: 2022-01-01 | End: 2022-01-01

## 2022-01-01 RX ORDER — FUROSEMIDE 40 MG/1
80 TABLET ORAL ONCE
Status: DISCONTINUED | OUTPATIENT
Start: 2022-01-01 | End: 2022-01-01

## 2022-01-01 RX ORDER — LANOLIN ALCOHOL/MO/W.PET/CERES
800 CREAM (GRAM) TOPICAL 2 TIMES DAILY
Status: DISCONTINUED | OUTPATIENT
Start: 2022-01-01 | End: 2022-01-01

## 2022-01-01 RX ORDER — ASPIRIN 81 MG/1
81 TABLET, CHEWABLE ORAL NIGHTLY
Status: DISCONTINUED | OUTPATIENT
Start: 2022-01-01 | End: 2022-01-01 | Stop reason: HOSPADM

## 2022-01-01 RX ORDER — ONDANSETRON 2 MG/ML
INJECTION INTRAMUSCULAR; INTRAVENOUS PRN
Status: DISCONTINUED | OUTPATIENT
Start: 2022-01-01 | End: 2022-01-01 | Stop reason: SDUPTHER

## 2022-01-01 RX ORDER — NICOTINE POLACRILEX 4 MG
15 LOZENGE BUCCAL PRN
Status: DISCONTINUED | OUTPATIENT
Start: 2022-01-01 | End: 2022-01-01 | Stop reason: HOSPADM

## 2022-01-01 RX ORDER — DEXTROSE MONOHYDRATE 25 G/50ML
12.5 INJECTION, SOLUTION INTRAVENOUS PRN
Status: DISCONTINUED | OUTPATIENT
Start: 2022-01-01 | End: 2022-01-01 | Stop reason: CLARIF

## 2022-01-01 RX ORDER — SODIUM CHLORIDE 0.9 % (FLUSH) 0.9 %
5-40 SYRINGE (ML) INJECTION PRN
Status: DISCONTINUED | OUTPATIENT
Start: 2022-01-01 | End: 2022-01-01 | Stop reason: HOSPADM

## 2022-01-01 RX ORDER — INSULIN GLARGINE 100 [IU]/ML
40 INJECTION, SOLUTION SUBCUTANEOUS EVERY MORNING
Status: DISCONTINUED | OUTPATIENT
Start: 2022-01-01 | End: 2022-01-01

## 2022-01-01 RX ORDER — MAGNESIUM SULFATE 1 G/100ML
1000 INJECTION INTRAVENOUS PRN
Status: DISCONTINUED | OUTPATIENT
Start: 2022-01-01 | End: 2022-01-01 | Stop reason: HOSPADM

## 2022-01-01 RX ORDER — HYDRALAZINE HYDROCHLORIDE 10 MG/1
10 TABLET, FILM COATED ORAL 2 TIMES DAILY
Qty: 90 TABLET | Refills: 1 | Status: ON HOLD
Start: 2022-01-01 | End: 2022-01-01 | Stop reason: HOSPADM

## 2022-01-01 RX ORDER — METOPROLOL SUCCINATE 25 MG/1
25 TABLET, EXTENDED RELEASE ORAL
Status: DISCONTINUED | OUTPATIENT
Start: 2022-01-01 | End: 2022-01-01 | Stop reason: HOSPADM

## 2022-01-01 RX ORDER — INSULIN GLARGINE 100 [IU]/ML
45 INJECTION, SOLUTION SUBCUTANEOUS EVERY MORNING
Status: DISCONTINUED | OUTPATIENT
Start: 2022-01-01 | End: 2022-01-01

## 2022-01-01 RX ORDER — MIDODRINE HYDROCHLORIDE 10 MG/1
10 TABLET ORAL
Status: DISCONTINUED | OUTPATIENT
Start: 2022-01-01 | End: 2022-01-01

## 2022-01-01 RX ORDER — CHLORHEXIDINE GLUCONATE 0.12 MG/ML
15 RINSE ORAL 2 TIMES DAILY
Status: DISCONTINUED | OUTPATIENT
Start: 2022-01-01 | End: 2022-01-01

## 2022-01-01 RX ORDER — INSULIN GLARGINE 100 [IU]/ML
30 INJECTION, SOLUTION SUBCUTANEOUS NIGHTLY
Status: DISCONTINUED | OUTPATIENT
Start: 2022-01-01 | End: 2022-01-01

## 2022-01-01 RX ORDER — LIDOCAINE HYDROCHLORIDE 40 MG/ML
SOLUTION TOPICAL ONCE
Status: DISCONTINUED | OUTPATIENT
Start: 2022-01-01 | End: 2022-01-01 | Stop reason: HOSPADM

## 2022-01-01 RX ORDER — ONDANSETRON 4 MG/1
4 TABLET, ORALLY DISINTEGRATING ORAL EVERY 8 HOURS PRN
Status: DISCONTINUED | OUTPATIENT
Start: 2022-01-01 | End: 2022-01-01 | Stop reason: HOSPADM

## 2022-01-01 RX ORDER — DOPAMINE HYDROCHLORIDE 160 MG/100ML
1-20 INJECTION, SOLUTION INTRAVENOUS CONTINUOUS
Status: DISCONTINUED | OUTPATIENT
Start: 2022-01-01 | End: 2022-01-01

## 2022-01-01 RX ORDER — INSULIN GLARGINE 100 [IU]/ML
25 INJECTION, SOLUTION SUBCUTANEOUS NIGHTLY
Status: DISCONTINUED | OUTPATIENT
Start: 2022-01-01 | End: 2022-01-01

## 2022-01-01 RX ORDER — ALBUTEROL SULFATE 2.5 MG/3ML
2.5 SOLUTION RESPIRATORY (INHALATION) EVERY 4 HOURS PRN
Status: DISCONTINUED | OUTPATIENT
Start: 2022-01-01 | End: 2022-01-01 | Stop reason: HOSPADM

## 2022-01-01 RX ORDER — LANOLIN ALCOHOL/MO/W.PET/CERES
400 CREAM (GRAM) TOPICAL DAILY
Status: DISCONTINUED | OUTPATIENT
Start: 2022-01-01 | End: 2022-01-01 | Stop reason: CLARIF

## 2022-01-01 RX ORDER — CYCLOBENZAPRINE HCL 10 MG
10 TABLET ORAL 2 TIMES DAILY PRN
Qty: 180 TABLET | Refills: 0 | OUTPATIENT
Start: 2022-01-01

## 2022-01-01 RX ORDER — INSULIN GLARGINE 100 [IU]/ML
25 INJECTION, SOLUTION SUBCUTANEOUS 2 TIMES DAILY
Status: DISCONTINUED | OUTPATIENT
Start: 2022-01-01 | End: 2022-01-01

## 2022-01-01 RX ORDER — METOLAZONE 2.5 MG/1
10 TABLET ORAL ONCE
Status: COMPLETED | OUTPATIENT
Start: 2022-01-01 | End: 2022-01-01

## 2022-01-01 RX ORDER — FENTANYL CITRATE 50 UG/ML
50 INJECTION, SOLUTION INTRAMUSCULAR; INTRAVENOUS
Status: DISCONTINUED | OUTPATIENT
Start: 2022-01-01 | End: 2022-01-01

## 2022-01-01 RX ORDER — ROCURONIUM BROMIDE 10 MG/ML
INJECTION, SOLUTION INTRAVENOUS PRN
Status: DISCONTINUED | OUTPATIENT
Start: 2022-01-01 | End: 2022-01-01 | Stop reason: SDUPTHER

## 2022-01-01 RX ORDER — FUROSEMIDE 10 MG/ML
100 INJECTION INTRAMUSCULAR; INTRAVENOUS ONCE
Status: COMPLETED | OUTPATIENT
Start: 2022-01-01 | End: 2022-01-01

## 2022-01-01 RX ORDER — ALBUMIN (HUMAN) 12.5 G/50ML
50 SOLUTION INTRAVENOUS ONCE
Status: COMPLETED | OUTPATIENT
Start: 2022-01-01 | End: 2022-01-01

## 2022-01-01 RX ORDER — FUROSEMIDE 10 MG/ML
40 INJECTION INTRAMUSCULAR; INTRAVENOUS ONCE
Status: DISCONTINUED | OUTPATIENT
Start: 2022-01-01 | End: 2022-01-01

## 2022-01-01 RX ORDER — SENNA PLUS 8.6 MG/1
2 TABLET ORAL NIGHTLY
Status: DISCONTINUED | OUTPATIENT
Start: 2022-01-01 | End: 2022-01-01 | Stop reason: HOSPADM

## 2022-01-01 RX ORDER — PROMETHAZINE HYDROCHLORIDE 25 MG/1
TABLET ORAL
Qty: 60 TABLET | Refills: 0 | Status: SHIPPED | OUTPATIENT
Start: 2022-01-01

## 2022-01-01 RX ORDER — POTASSIUM CHLORIDE 20 MEQ/1
40 TABLET, EXTENDED RELEASE ORAL ONCE
Status: COMPLETED | OUTPATIENT
Start: 2022-01-01 | End: 2022-01-01

## 2022-01-01 RX ORDER — TOBRAMYCIN INHALATION SOLUTION 300 MG/5ML
300 INHALANT RESPIRATORY (INHALATION) EVERY 12 HOURS
Status: DISCONTINUED | OUTPATIENT
Start: 2022-01-01 | End: 2022-01-01

## 2022-01-01 RX ORDER — CARBOXYMETHYLCELLULOSE SODIUM 10 MG/ML
1 GEL OPHTHALMIC EVERY 4 HOURS
Status: DISCONTINUED | OUTPATIENT
Start: 2022-01-01 | End: 2022-01-01

## 2022-01-01 RX ORDER — CETIRIZINE HYDROCHLORIDE 10 MG/1
10 TABLET ORAL DAILY
COMMUNITY

## 2022-01-01 RX ORDER — METOLAZONE 5 MG/1
5 TABLET ORAL DAILY
Qty: 30 TABLET | Refills: 3 | Status: SHIPPED | OUTPATIENT
Start: 2022-01-01 | End: 2022-01-01

## 2022-01-01 RX ORDER — INSULIN GLARGINE 100 [IU]/ML
10 INJECTION, SOLUTION SUBCUTANEOUS ONCE
Status: COMPLETED | OUTPATIENT
Start: 2022-01-01 | End: 2022-01-01

## 2022-01-01 RX ORDER — TRAZODONE HYDROCHLORIDE 50 MG/1
50 TABLET ORAL NIGHTLY
Status: DISCONTINUED | OUTPATIENT
Start: 2022-01-01 | End: 2022-01-01

## 2022-01-01 RX ORDER — DEXTROSE MONOHYDRATE 50 MG/ML
100 INJECTION, SOLUTION INTRAVENOUS PRN
Status: DISCONTINUED | OUTPATIENT
Start: 2022-01-01 | End: 2022-01-01 | Stop reason: SDUPTHER

## 2022-01-01 RX ORDER — SODIUM CHLORIDE 9 MG/ML
INJECTION, SOLUTION INTRAVENOUS PRN
Status: DISCONTINUED | OUTPATIENT
Start: 2022-01-01 | End: 2022-01-01 | Stop reason: HOSPADM

## 2022-01-01 RX ORDER — OXYCODONE AND ACETAMINOPHEN 10; 325 MG/1; MG/1
1 TABLET ORAL EVERY 6 HOURS PRN
Status: DISCONTINUED | OUTPATIENT
Start: 2022-01-01 | End: 2022-01-01 | Stop reason: HOSPADM

## 2022-01-01 RX ORDER — MINERAL OIL AND WHITE PETROLATUM 150; 830 MG/G; MG/G
OINTMENT OPHTHALMIC EVERY 4 HOURS
Status: DISCONTINUED | OUTPATIENT
Start: 2022-01-01 | End: 2022-01-01

## 2022-01-01 RX ORDER — ALBUTEROL SULFATE 90 UG/1
2 AEROSOL, METERED RESPIRATORY (INHALATION) EVERY 4 HOURS PRN
Status: DISCONTINUED | OUTPATIENT
Start: 2022-01-01 | End: 2022-01-01

## 2022-01-01 RX ORDER — LIDOCAINE 50 MG/G
OINTMENT TOPICAL ONCE
Status: CANCELLED | OUTPATIENT
Start: 2022-01-01 | End: 2022-01-01

## 2022-01-01 RX ORDER — HYDRALAZINE HYDROCHLORIDE 10 MG/1
10 TABLET, FILM COATED ORAL 2 TIMES DAILY
Status: DISCONTINUED | OUTPATIENT
Start: 2022-01-01 | End: 2022-01-01

## 2022-01-01 RX ORDER — ONDANSETRON 2 MG/ML
4 INJECTION INTRAMUSCULAR; INTRAVENOUS EVERY 6 HOURS PRN
Status: DISCONTINUED | OUTPATIENT
Start: 2022-01-01 | End: 2022-01-01 | Stop reason: HOSPADM

## 2022-01-01 RX ORDER — NICOTINE POLACRILEX 4 MG
15 LOZENGE BUCCAL PRN
Status: DISCONTINUED | OUTPATIENT
Start: 2022-01-01 | End: 2022-01-01 | Stop reason: SDUPTHER

## 2022-01-01 RX ORDER — SODIUM CHLORIDE, SODIUM LACTATE, POTASSIUM CHLORIDE, CALCIUM CHLORIDE 600; 310; 30; 20 MG/100ML; MG/100ML; MG/100ML; MG/100ML
INJECTION, SOLUTION INTRAVENOUS CONTINUOUS PRN
Status: DISCONTINUED | OUTPATIENT
Start: 2022-01-01 | End: 2022-01-01 | Stop reason: SDUPTHER

## 2022-01-01 RX ORDER — 0.9 % SODIUM CHLORIDE 0.9 %
1000 INTRAVENOUS SOLUTION INTRAVENOUS ONCE
Status: COMPLETED | OUTPATIENT
Start: 2022-01-01 | End: 2022-01-01

## 2022-01-01 RX ORDER — DEXTROSE MONOHYDRATE 25 G/50ML
25 INJECTION, SOLUTION INTRAVENOUS ONCE
Status: DISCONTINUED | OUTPATIENT
Start: 2022-01-01 | End: 2022-01-01 | Stop reason: CLARIF

## 2022-01-01 RX ORDER — FENTANYL CITRATE 50 UG/ML
INJECTION, SOLUTION INTRAMUSCULAR; INTRAVENOUS PRN
Status: DISCONTINUED | OUTPATIENT
Start: 2022-01-01 | End: 2022-01-01 | Stop reason: ALTCHOICE

## 2022-01-01 RX ORDER — DOBUTAMINE HYDROCHLORIDE 200 MG/100ML
2.5-1 INJECTION INTRAVENOUS CONTINUOUS
Status: DISCONTINUED | OUTPATIENT
Start: 2022-01-01 | End: 2022-01-01 | Stop reason: SDUPTHER

## 2022-01-01 RX ORDER — LIDOCAINE 50 MG/G
OINTMENT TOPICAL ONCE
Status: DISCONTINUED | OUTPATIENT
Start: 2022-01-01 | End: 2022-01-01 | Stop reason: HOSPADM

## 2022-01-01 RX ORDER — HEPARIN SODIUM 1000 [USP'U]/ML
2000 INJECTION, SOLUTION INTRAVENOUS; SUBCUTANEOUS PRN
Status: DISCONTINUED | OUTPATIENT
Start: 2022-01-01 | End: 2022-01-01

## 2022-01-01 RX ORDER — LIDOCAINE HYDROCHLORIDE 40 MG/ML
SOLUTION TOPICAL
Status: DISPENSED
Start: 2022-01-01 | End: 2022-01-01

## 2022-01-01 RX ORDER — CIPROFLOXACIN 500 MG/1
500 TABLET, FILM COATED ORAL EVERY 12 HOURS SCHEDULED
Status: DISCONTINUED | OUTPATIENT
Start: 2022-01-01 | End: 2022-01-01 | Stop reason: HOSPADM

## 2022-01-01 RX ORDER — PANTOPRAZOLE SODIUM 40 MG/1
40 TABLET, DELAYED RELEASE ORAL DAILY
Status: DISCONTINUED | OUTPATIENT
Start: 2022-01-01 | End: 2022-01-01

## 2022-01-01 RX ORDER — CLINDAMYCIN PHOSPHATE 900 MG/50ML
900 INJECTION INTRAVENOUS EVERY 8 HOURS
Status: DISCONTINUED | OUTPATIENT
Start: 2022-01-01 | End: 2022-01-01 | Stop reason: SDUPTHER

## 2022-01-01 RX ORDER — SODIUM CHLORIDE 0.9 % (FLUSH) 0.9 %
5-40 SYRINGE (ML) INJECTION EVERY 12 HOURS SCHEDULED
Status: DISCONTINUED | OUTPATIENT
Start: 2022-01-01 | End: 2022-01-01 | Stop reason: HOSPADM

## 2022-01-01 RX ORDER — 0.9 % SODIUM CHLORIDE 0.9 %
2000 INTRAVENOUS SOLUTION INTRAVENOUS ONCE
Status: COMPLETED | OUTPATIENT
Start: 2022-01-01 | End: 2022-01-01

## 2022-01-01 RX ORDER — HEPARIN SODIUM 10000 [USP'U]/100ML
2950 INJECTION, SOLUTION INTRAVENOUS CONTINUOUS
Status: DISCONTINUED | OUTPATIENT
Start: 2022-01-01 | End: 2022-01-01 | Stop reason: CLARIF

## 2022-01-01 RX ORDER — TRAZODONE HYDROCHLORIDE 50 MG/1
TABLET ORAL
Qty: 30 TABLET | Refills: 0 | Status: SHIPPED | OUTPATIENT
Start: 2022-01-01

## 2022-01-01 RX ORDER — FERROUS SULFATE 325(65) MG
325 TABLET ORAL
Status: DISCONTINUED | OUTPATIENT
Start: 2022-01-01 | End: 2022-01-01 | Stop reason: HOSPADM

## 2022-01-01 RX ORDER — MIDAZOLAM HYDROCHLORIDE 5 MG/ML
INJECTION INTRAMUSCULAR; INTRAVENOUS PRN
Status: DISCONTINUED | OUTPATIENT
Start: 2022-01-01 | End: 2022-01-01 | Stop reason: ALTCHOICE

## 2022-01-01 RX ORDER — MIDAZOLAM HYDROCHLORIDE 5 MG/ML
INJECTION INTRAMUSCULAR; INTRAVENOUS
Status: COMPLETED | OUTPATIENT
Start: 2022-01-01 | End: 2022-01-01

## 2022-01-01 RX ORDER — LIDOCAINE HYDROCHLORIDE AND EPINEPHRINE 10; 10 MG/ML; UG/ML
INJECTION, SOLUTION INFILTRATION; PERINEURAL PRN
Status: DISCONTINUED | OUTPATIENT
Start: 2022-01-01 | End: 2022-01-01 | Stop reason: ALTCHOICE

## 2022-01-01 RX ORDER — DEXTROSE AND SODIUM CHLORIDE 5; .9 G/100ML; G/100ML
INJECTION, SOLUTION INTRAVENOUS CONTINUOUS
Status: DISCONTINUED | OUTPATIENT
Start: 2022-01-01 | End: 2022-01-01

## 2022-01-01 RX ORDER — INSULIN GLARGINE 100 [IU]/ML
20 INJECTION, SOLUTION SUBCUTANEOUS 2 TIMES DAILY
Status: DISCONTINUED | OUTPATIENT
Start: 2022-01-01 | End: 2022-01-01

## 2022-01-01 RX ORDER — TOBRAMYCIN INHALATION SOLUTION 300 MG/5ML
300 INHALANT RESPIRATORY (INHALATION) 2 TIMES DAILY
Status: DISCONTINUED | OUTPATIENT
Start: 2022-01-01 | End: 2022-01-01

## 2022-01-01 RX ORDER — ACETAMINOPHEN 650 MG/1
650 SUPPOSITORY RECTAL EVERY 6 HOURS PRN
Status: DISCONTINUED | OUTPATIENT
Start: 2022-01-01 | End: 2022-01-01 | Stop reason: HOSPADM

## 2022-01-01 RX ORDER — PANTOPRAZOLE SODIUM 40 MG/1
TABLET, DELAYED RELEASE ORAL
Qty: 30 TABLET | Refills: 0 | Status: SHIPPED | OUTPATIENT
Start: 2022-01-01

## 2022-01-01 RX ORDER — DIGOXIN 0.25 MG/ML
125 INJECTION INTRAMUSCULAR; INTRAVENOUS ONCE
Status: COMPLETED | OUTPATIENT
Start: 2022-01-01 | End: 2022-01-01

## 2022-01-01 RX ORDER — BUDESONIDE AND FORMOTEROL FUMARATE DIHYDRATE 160; 4.5 UG/1; UG/1
2 AEROSOL RESPIRATORY (INHALATION) 2 TIMES DAILY
Status: DISCONTINUED | OUTPATIENT
Start: 2022-01-01 | End: 2022-01-01 | Stop reason: HOSPADM

## 2022-01-01 RX ORDER — INSULIN LISPRO 100 [IU]/ML
0-18 INJECTION, SOLUTION INTRAVENOUS; SUBCUTANEOUS EVERY 4 HOURS
Status: DISCONTINUED | OUTPATIENT
Start: 2022-01-01 | End: 2022-01-01 | Stop reason: HOSPADM

## 2022-01-01 RX ORDER — 0.9 % SODIUM CHLORIDE 0.9 %
500 INTRAVENOUS SOLUTION INTRAVENOUS ONCE
Status: COMPLETED | OUTPATIENT
Start: 2022-01-01 | End: 2022-01-01

## 2022-01-01 RX ORDER — SODIUM CHLORIDE, SODIUM LACTATE, POTASSIUM CHLORIDE, CALCIUM CHLORIDE 600; 310; 30; 20 MG/100ML; MG/100ML; MG/100ML; MG/100ML
INJECTION, SOLUTION INTRAVENOUS CONTINUOUS
Status: DISCONTINUED | OUTPATIENT
Start: 2022-01-01 | End: 2022-01-01 | Stop reason: HOSPADM

## 2022-01-01 RX ORDER — ARGATROBAN 1 MG/ML
0.25 INJECTION, SOLUTION INTRAVENOUS CONTINUOUS
Status: DISCONTINUED | OUTPATIENT
Start: 2022-01-01 | End: 2022-01-01

## 2022-01-01 RX ORDER — INSULIN GLARGINE 100 [IU]/ML
33 INJECTION, SOLUTION SUBCUTANEOUS 2 TIMES DAILY
Status: DISCONTINUED | OUTPATIENT
Start: 2022-01-01 | End: 2022-01-01 | Stop reason: HOSPADM

## 2022-01-01 RX ORDER — ARGATROBAN 1 MG/ML
0.5 INJECTION, SOLUTION INTRAVENOUS CONTINUOUS
Status: DISCONTINUED | OUTPATIENT
Start: 2022-01-01 | End: 2022-01-01

## 2022-01-01 RX ORDER — ARGATROBAN 1 MG/ML
0.5 INJECTION INTRAVENOUS CONTINUOUS
Status: DISCONTINUED | OUTPATIENT
Start: 2022-01-01 | End: 2022-01-01

## 2022-01-01 RX ORDER — HEPARIN SODIUM 10000 [USP'U]/100ML
2950 INJECTION, SOLUTION INTRAVENOUS CONTINUOUS
Status: ACTIVE | OUTPATIENT
Start: 2022-01-01 | End: 2022-01-01

## 2022-01-01 RX ORDER — PANTOPRAZOLE SODIUM 40 MG/1
40 TABLET, DELAYED RELEASE ORAL
Status: DISCONTINUED | OUTPATIENT
Start: 2022-01-01 | End: 2022-01-01 | Stop reason: HOSPADM

## 2022-01-01 RX ORDER — INSULIN GLARGINE 100 [IU]/ML
15 INJECTION, SOLUTION SUBCUTANEOUS NIGHTLY
Status: DISCONTINUED | OUTPATIENT
Start: 2022-01-01 | End: 2022-01-01

## 2022-01-01 RX ORDER — 0.9 % SODIUM CHLORIDE 0.9 %
1000 INTRAVENOUS SOLUTION INTRAVENOUS ONCE
Status: DISCONTINUED | OUTPATIENT
Start: 2022-01-01 | End: 2022-01-01 | Stop reason: HOSPADM

## 2022-01-01 RX ORDER — ALBUTEROL SULFATE 2.5 MG/3ML
2.5 SOLUTION RESPIRATORY (INHALATION) 2 TIMES DAILY
Status: DISCONTINUED | OUTPATIENT
Start: 2022-01-01 | End: 2022-01-01

## 2022-01-01 RX ORDER — LORAZEPAM 2 MG/ML
0.5 INJECTION INTRAMUSCULAR EVERY 4 HOURS PRN
Status: DISCONTINUED | OUTPATIENT
Start: 2022-01-01 | End: 2022-01-01 | Stop reason: HOSPADM

## 2022-01-01 RX ORDER — DOCUSATE SODIUM 100 MG/1
100 CAPSULE, LIQUID FILLED ORAL 2 TIMES DAILY
Status: DISCONTINUED | OUTPATIENT
Start: 2022-01-01 | End: 2022-01-01

## 2022-01-01 RX ORDER — HEPARIN SODIUM 10000 [USP'U]/100ML
1050 INJECTION, SOLUTION INTRAVENOUS CONTINUOUS
Status: DISPENSED | OUTPATIENT
Start: 2022-01-01 | End: 2022-01-01

## 2022-01-01 RX ORDER — 0.9 % SODIUM CHLORIDE 0.9 %
1000 INTRAVENOUS SOLUTION INTRAVENOUS ONCE
Status: DISCONTINUED | OUTPATIENT
Start: 2022-01-01 | End: 2022-01-01

## 2022-01-01 RX ORDER — 0.9 % SODIUM CHLORIDE 0.9 %
20 INTRAVENOUS SOLUTION INTRAVENOUS ONCE
Status: DISCONTINUED | OUTPATIENT
Start: 2022-01-01 | End: 2022-01-01

## 2022-01-01 RX ORDER — IPRATROPIUM BROMIDE AND ALBUTEROL SULFATE 2.5; .5 MG/3ML; MG/3ML
1 SOLUTION RESPIRATORY (INHALATION) EVERY 4 HOURS
Status: DISCONTINUED | OUTPATIENT
Start: 2022-01-01 | End: 2022-01-01

## 2022-01-01 RX ORDER — DOCUSATE SODIUM 100 MG/1
100 CAPSULE, LIQUID FILLED ORAL 2 TIMES DAILY
Status: DISCONTINUED | OUTPATIENT
Start: 2022-01-01 | End: 2022-01-01 | Stop reason: HOSPADM

## 2022-01-01 RX ORDER — ARGATROBAN 1 MG/ML
0.12 INJECTION, SOLUTION INTRAVENOUS CONTINUOUS
Status: DISCONTINUED | OUTPATIENT
Start: 2022-01-01 | End: 2022-01-01

## 2022-01-01 RX ORDER — INSULIN GLARGINE 100 [IU]/ML
35 INJECTION, SOLUTION SUBCUTANEOUS NIGHTLY
Status: DISCONTINUED | OUTPATIENT
Start: 2022-01-01 | End: 2022-01-01

## 2022-01-01 RX ORDER — CARBOXYMETHYLCELLULOSE SODIUM 10 MG/ML
1 GEL OPHTHALMIC EVERY 4 HOURS PRN
Status: DISCONTINUED | OUTPATIENT
Start: 2022-01-01 | End: 2022-01-01

## 2022-01-01 RX ORDER — INSULIN GLARGINE 100 [IU]/ML
INJECTION, SOLUTION SUBCUTANEOUS
Qty: 50 ML | Refills: 0 | Status: SHIPPED | OUTPATIENT
Start: 2022-01-01

## 2022-01-01 RX ORDER — POLYETHYLENE GLYCOL 3350 17 G/17G
17 POWDER, FOR SOLUTION ORAL DAILY PRN
Status: DISCONTINUED | OUTPATIENT
Start: 2022-01-01 | End: 2022-01-01 | Stop reason: HOSPADM

## 2022-01-01 RX ORDER — OXYCODONE HYDROCHLORIDE AND ACETAMINOPHEN 5; 325 MG/1; MG/1
1 TABLET ORAL EVERY 4 HOURS PRN
Status: DISCONTINUED | OUTPATIENT
Start: 2022-01-01 | End: 2022-01-01

## 2022-01-01 RX ORDER — DOBUTAMINE HYDROCHLORIDE 200 MG/100ML
5 INJECTION INTRAVENOUS CONTINUOUS
Status: DISCONTINUED | OUTPATIENT
Start: 2022-01-01 | End: 2022-01-01 | Stop reason: SDUPTHER

## 2022-01-01 RX ORDER — TORSEMIDE 100 MG/1
TABLET ORAL
Qty: 30 TABLET | Refills: 1 | Status: SHIPPED | OUTPATIENT
Start: 2022-01-01 | End: 2022-01-01 | Stop reason: SDUPTHER

## 2022-01-01 RX ORDER — POLYETHYLENE GLYCOL 3350 17 G/17G
17 POWDER, FOR SOLUTION ORAL DAILY
Status: DISCONTINUED | OUTPATIENT
Start: 2022-01-01 | End: 2022-01-01 | Stop reason: HOSPADM

## 2022-01-01 RX ORDER — SPIRONOLACTONE 25 MG/1
25 TABLET ORAL DAILY
Status: DISCONTINUED | OUTPATIENT
Start: 2022-01-01 | End: 2022-01-01

## 2022-01-01 RX ORDER — SENNA PLUS 8.6 MG/1
1 TABLET ORAL NIGHTLY
Status: DISCONTINUED | OUTPATIENT
Start: 2022-01-01 | End: 2022-01-01 | Stop reason: DRUGHIGH

## 2022-01-01 RX ORDER — CYCLOBENZAPRINE HCL 10 MG
10 TABLET ORAL 2 TIMES DAILY PRN
Status: DISCONTINUED | OUTPATIENT
Start: 2022-01-01 | End: 2022-01-01 | Stop reason: HOSPADM

## 2022-01-01 RX ORDER — LORAZEPAM 2 MG/ML
1 INJECTION INTRAMUSCULAR
Status: DISCONTINUED | OUTPATIENT
Start: 2022-01-01 | End: 2022-01-01 | Stop reason: HOSPADM

## 2022-01-01 RX ORDER — OXYCODONE AND ACETAMINOPHEN 7.5; 325 MG/1; MG/1
1 TABLET ORAL EVERY 4 HOURS PRN
Status: DISCONTINUED | OUTPATIENT
Start: 2022-01-01 | End: 2022-01-01

## 2022-01-01 RX ORDER — INSULIN GLARGINE 100 [IU]/ML
40 INJECTION, SOLUTION SUBCUTANEOUS 2 TIMES DAILY
Status: DISCONTINUED | OUTPATIENT
Start: 2022-01-01 | End: 2022-01-01

## 2022-01-01 RX ORDER — NICOTINE POLACRILEX 4 MG
15 LOZENGE BUCCAL PRN
Status: DISCONTINUED | OUTPATIENT
Start: 2022-01-01 | End: 2022-01-01 | Stop reason: CLARIF

## 2022-01-01 RX ORDER — CLINDAMYCIN PHOSPHATE 900 MG/50ML
900 INJECTION INTRAVENOUS EVERY 8 HOURS
Status: DISCONTINUED | OUTPATIENT
Start: 2022-01-01 | End: 2022-01-01

## 2022-01-01 RX ORDER — MINERAL OIL AND WHITE PETROLATUM 150; 830 MG/G; MG/G
OINTMENT OPHTHALMIC EVERY 4 HOURS PRN
Status: DISCONTINUED | OUTPATIENT
Start: 2022-01-01 | End: 2022-01-01

## 2022-01-01 RX ORDER — TORSEMIDE 100 MG/1
TABLET ORAL
Qty: 90 TABLET | Refills: 3 | Status: SHIPPED | OUTPATIENT
Start: 2022-01-01

## 2022-01-01 RX ORDER — INSULIN GLARGINE 100 [IU]/ML
60 INJECTION, SOLUTION SUBCUTANEOUS 2 TIMES DAILY
Status: DISCONTINUED | OUTPATIENT
Start: 2022-01-01 | End: 2022-01-01

## 2022-01-01 RX ORDER — POLYETHYLENE GLYCOL 3350 17 G/17G
17 POWDER, FOR SOLUTION ORAL PRN
Status: DISCONTINUED | OUTPATIENT
Start: 2022-01-01 | End: 2022-01-01 | Stop reason: SDUPTHER

## 2022-01-01 RX ORDER — SODIUM CHLORIDE FOR INHALATION 3 %
4 VIAL, NEBULIZER (ML) INHALATION EVERY 12 HOURS
Status: DISCONTINUED | OUTPATIENT
Start: 2022-01-01 | End: 2022-01-01 | Stop reason: HOSPADM

## 2022-01-01 RX ORDER — HEPARIN SODIUM 10000 [USP'U]/100ML
2950 INJECTION, SOLUTION INTRAVENOUS CONTINUOUS
Status: DISCONTINUED | OUTPATIENT
Start: 2022-01-01 | End: 2022-01-01

## 2022-01-01 RX ORDER — FUROSEMIDE 10 MG/ML
80 INJECTION INTRAMUSCULAR; INTRAVENOUS DAILY
Status: DISCONTINUED | OUTPATIENT
Start: 2022-01-01 | End: 2022-01-01

## 2022-01-01 RX ORDER — HEPARIN SODIUM 10000 [USP'U]/100ML
12 INJECTION, SOLUTION INTRAVENOUS CONTINUOUS
Status: DISCONTINUED | OUTPATIENT
Start: 2022-01-01 | End: 2022-01-01 | Stop reason: DRUGHIGH

## 2022-01-01 RX ORDER — 0.9 % SODIUM CHLORIDE 0.9 %
500 INTRAVENOUS SOLUTION INTRAVENOUS ONCE
Status: DISCONTINUED | OUTPATIENT
Start: 2022-01-01 | End: 2022-01-01 | Stop reason: HOSPADM

## 2022-01-01 RX ORDER — IPRATROPIUM BROMIDE AND ALBUTEROL SULFATE 2.5; .5 MG/3ML; MG/3ML
1 SOLUTION RESPIRATORY (INHALATION)
Status: DISCONTINUED | OUTPATIENT
Start: 2022-01-01 | End: 2022-01-01

## 2022-01-01 RX ADMIN — SODIUM CHLORIDE 30 MG/ML INHALATION SOLUTION 4 ML: 30 SOLUTION INHALANT at 07:53

## 2022-01-01 RX ADMIN — PANTOPRAZOLE SODIUM 40 MG: 40 INJECTION, POWDER, FOR SOLUTION INTRAVENOUS at 08:05

## 2022-01-01 RX ADMIN — CIPROFLOXACIN HYDROCHLORIDE 500 MG: 500 TABLET, FILM COATED ORAL at 08:16

## 2022-01-01 RX ADMIN — FENTANYL CITRATE 50 MCG: 50 INJECTION INTRAMUSCULAR; INTRAVENOUS at 00:00

## 2022-01-01 RX ADMIN — CLINDAMYCIN PHOSPHATE 900 MG: 900 INJECTION, SOLUTION INTRAVENOUS at 02:18

## 2022-01-01 RX ADMIN — Medication 150 MG: at 03:25

## 2022-01-01 RX ADMIN — MEROPENEM 1000 MG: 1 INJECTION, POWDER, FOR SOLUTION INTRAVENOUS at 08:20

## 2022-01-01 RX ADMIN — INSULIN GLARGINE 33 UNITS: 100 INJECTION, SOLUTION SUBCUTANEOUS at 21:15

## 2022-01-01 RX ADMIN — OXYCODONE HYDROCHLORIDE AND ACETAMINOPHEN 1 TABLET: 5; 325 TABLET ORAL at 20:55

## 2022-01-01 RX ADMIN — CHLORHEXIDINE GLUCONATE 0.12% ORAL RINSE 15 ML: 1.2 LIQUID ORAL at 08:39

## 2022-01-01 RX ADMIN — INSULIN GLARGINE 33 UNITS: 100 INJECTION, SOLUTION SUBCUTANEOUS at 20:46

## 2022-01-01 RX ADMIN — SODIUM CHLORIDE, PRESERVATIVE FREE 10 ML: 5 INJECTION INTRAVENOUS at 08:39

## 2022-01-01 RX ADMIN — DEXTROSE MONOHYDRATE 125 ML: 100 INJECTION, SOLUTION INTRAVENOUS at 06:56

## 2022-01-01 RX ADMIN — OXYCODONE HYDROCHLORIDE AND ACETAMINOPHEN 1 TABLET: 5; 325 TABLET ORAL at 15:11

## 2022-01-01 RX ADMIN — Medication 2 PUFF: at 07:43

## 2022-01-01 RX ADMIN — IPRATROPIUM BROMIDE AND ALBUTEROL SULFATE 1 AMPULE: 2.5; .5 SOLUTION RESPIRATORY (INHALATION) at 19:22

## 2022-01-01 RX ADMIN — CETIRIZINE HYDROCHLORIDE 10 MG: 10 TABLET ORAL at 08:32

## 2022-01-01 RX ADMIN — IPRATROPIUM BROMIDE AND ALBUTEROL SULFATE 1 AMPULE: .5; 2.5 SOLUTION RESPIRATORY (INHALATION) at 14:47

## 2022-01-01 RX ADMIN — IPRATROPIUM BROMIDE AND ALBUTEROL SULFATE 1 AMPULE: .5; 2.5 SOLUTION RESPIRATORY (INHALATION) at 07:26

## 2022-01-01 RX ADMIN — WHITE PETROLATUM 57.7 %-MINERAL OIL 31.9 % EYE OINTMENT: at 08:15

## 2022-01-01 RX ADMIN — Medication: at 03:10

## 2022-01-01 RX ADMIN — DOBUTAMINE 5 MCG/KG/MIN: 12.5 INJECTION, SOLUTION, CONCENTRATE INTRAVENOUS at 08:13

## 2022-01-01 RX ADMIN — IPRATROPIUM BROMIDE AND ALBUTEROL SULFATE 1 AMPULE: .5; 2.5 SOLUTION RESPIRATORY (INHALATION) at 23:46

## 2022-01-01 RX ADMIN — CALCIUM GLUCONATE 1000 MG: 98 INJECTION, SOLUTION INTRAVENOUS at 00:15

## 2022-01-01 RX ADMIN — DOCUSATE SODIUM 100 MG: 100 CAPSULE, LIQUID FILLED ORAL at 20:20

## 2022-01-01 RX ADMIN — MUPIROCIN: 20 OINTMENT TOPICAL at 08:22

## 2022-01-01 RX ADMIN — INSULIN LISPRO 2 UNITS: 100 INJECTION, SOLUTION INTRAVENOUS; SUBCUTANEOUS at 00:06

## 2022-01-01 RX ADMIN — VANCOMYCIN HYDROCHLORIDE 1000 MG: 10 INJECTION, POWDER, LYOPHILIZED, FOR SOLUTION INTRAVENOUS at 05:55

## 2022-01-01 RX ADMIN — ALBUTEROL SULFATE 2.5 MG: 2.5 SOLUTION RESPIRATORY (INHALATION) at 22:37

## 2022-01-01 RX ADMIN — Medication: at 07:10

## 2022-01-01 RX ADMIN — IPRATROPIUM BROMIDE AND ALBUTEROL SULFATE 1 AMPULE: 2.5; .5 SOLUTION RESPIRATORY (INHALATION) at 23:42

## 2022-01-01 RX ADMIN — INSULIN GLARGINE 25 UNITS: 100 INJECTION, SOLUTION SUBCUTANEOUS at 08:38

## 2022-01-01 RX ADMIN — POTASSIUM CHLORIDE 20 MEQ: 29.8 INJECTION, SOLUTION INTRAVENOUS at 22:20

## 2022-01-01 RX ADMIN — FENTANYL CITRATE 50 MCG: 50 INJECTION INTRAMUSCULAR; INTRAVENOUS at 10:13

## 2022-01-01 RX ADMIN — CALCIUM GLUCONATE 1000 MG: 98 INJECTION, SOLUTION INTRAVENOUS at 10:06

## 2022-01-01 RX ADMIN — SODIUM CHLORIDE, POTASSIUM CHLORIDE, SODIUM LACTATE AND CALCIUM CHLORIDE: 600; 310; 30; 20 INJECTION, SOLUTION INTRAVENOUS at 09:14

## 2022-01-01 RX ADMIN — Medication 150 MG: at 03:18

## 2022-01-01 RX ADMIN — CEFTAZIDIME 1000 MG: 1 INJECTION, POWDER, FOR SOLUTION INTRAMUSCULAR; INTRAVENOUS at 14:00

## 2022-01-01 RX ADMIN — CALCIUM GLUCONATE 1000 MG: 98 INJECTION, SOLUTION INTRAVENOUS at 17:35

## 2022-01-01 RX ADMIN — ACETAMINOPHEN 650 MG: 325 TABLET ORAL at 20:57

## 2022-01-01 RX ADMIN — FUROSEMIDE 10 MG/HR: 10 INJECTION, SOLUTION INTRAVENOUS at 05:00

## 2022-01-01 RX ADMIN — INSULIN LISPRO 3 UNITS: 100 INJECTION, SOLUTION INTRAVENOUS; SUBCUTANEOUS at 23:44

## 2022-01-01 RX ADMIN — HYDRALAZINE HYDROCHLORIDE 10 MG: 10 TABLET, FILM COATED ORAL at 20:27

## 2022-01-01 RX ADMIN — KETAMINE HYDROCHLORIDE 0.1 MG/KG/HR: 50 INJECTION, SOLUTION INTRAMUSCULAR; INTRAVENOUS at 09:36

## 2022-01-01 RX ADMIN — STANDARDIZED SENNA CONCENTRATE AND DOCUSATE SODIUM 2 TABLET: 8.6; 5 TABLET ORAL at 21:18

## 2022-01-01 RX ADMIN — CETIRIZINE HYDROCHLORIDE 10 MG: 10 TABLET ORAL at 22:28

## 2022-01-01 RX ADMIN — MEROPENEM 1000 MG: 1 INJECTION, POWDER, FOR SOLUTION INTRAVENOUS at 04:36

## 2022-01-01 RX ADMIN — VASOPRESSIN 0.04 UNITS/MIN: 20 INJECTION PARENTERAL at 05:12

## 2022-01-01 RX ADMIN — PANTOPRAZOLE SODIUM 40 MG: 40 TABLET, DELAYED RELEASE ORAL at 05:00

## 2022-01-01 RX ADMIN — IPRATROPIUM BROMIDE AND ALBUTEROL SULFATE 1 AMPULE: .5; 2.5 SOLUTION RESPIRATORY (INHALATION) at 10:46

## 2022-01-01 RX ADMIN — CALCIUM GLUCONATE 1000 MG: 98 INJECTION, SOLUTION INTRAVENOUS at 10:10

## 2022-01-01 RX ADMIN — MIDODRINE HYDROCHLORIDE 10 MG: 10 TABLET ORAL at 07:29

## 2022-01-01 RX ADMIN — CALCIUM GLUCONATE 1000 MG: 98 INJECTION, SOLUTION INTRAVENOUS at 05:44

## 2022-01-01 RX ADMIN — CALCIUM GLUCONATE 1000 MG: 98 INJECTION, SOLUTION INTRAVENOUS at 05:42

## 2022-01-01 RX ADMIN — MAGNESIUM GLUCONATE 500 MG ORAL TABLET 800 MG: 500 TABLET ORAL at 08:39

## 2022-01-01 RX ADMIN — ANORECTAL OINTMENT: 15.7; .44; 24; 20.6 OINTMENT TOPICAL at 07:47

## 2022-01-01 RX ADMIN — INSULIN LISPRO 3 UNITS: 100 INJECTION, SOLUTION INTRAVENOUS; SUBCUTANEOUS at 00:19

## 2022-01-01 RX ADMIN — CIPROFLOXACIN HYDROCHLORIDE 500 MG: 500 TABLET, FILM COATED ORAL at 15:36

## 2022-01-01 RX ADMIN — WHITE PETROLATUM 57.7 %-MINERAL OIL 31.9 % EYE OINTMENT: at 20:19

## 2022-01-01 RX ADMIN — APIXABAN 5 MG: 5 TABLET, FILM COATED ORAL at 21:14

## 2022-01-01 RX ADMIN — INSULIN GLARGINE 60 UNITS: 100 INJECTION, SOLUTION SUBCUTANEOUS at 08:42

## 2022-01-01 RX ADMIN — VASOPRESSIN 0.04 UNITS/MIN: 20 INJECTION PARENTERAL at 13:12

## 2022-01-01 RX ADMIN — Medication: at 18:14

## 2022-01-01 RX ADMIN — HYDROCORTISONE SODIUM SUCCINATE 100 MG: 100 INJECTION, POWDER, FOR SOLUTION INTRAMUSCULAR; INTRAVENOUS at 03:07

## 2022-01-01 RX ADMIN — DOCUSATE SODIUM 100 MG: 100 CAPSULE, LIQUID FILLED ORAL at 08:16

## 2022-01-01 RX ADMIN — CIPROFLOXACIN HYDROCHLORIDE 500 MG: 500 TABLET, FILM COATED ORAL at 08:24

## 2022-01-01 RX ADMIN — IPRATROPIUM BROMIDE AND ALBUTEROL SULFATE 1 AMPULE: 2.5; .5 SOLUTION RESPIRATORY (INHALATION) at 18:53

## 2022-01-01 RX ADMIN — MIDODRINE HYDROCHLORIDE 15 MG: 5 TABLET ORAL at 10:11

## 2022-01-01 RX ADMIN — CETIRIZINE HYDROCHLORIDE 10 MG: 10 TABLET ORAL at 09:44

## 2022-01-01 RX ADMIN — MIDODRINE HYDROCHLORIDE 10 MG: 10 TABLET ORAL at 12:00

## 2022-01-01 RX ADMIN — IPRATROPIUM BROMIDE AND ALBUTEROL SULFATE 1 AMPULE: 2.5; .5 SOLUTION RESPIRATORY (INHALATION) at 16:11

## 2022-01-01 RX ADMIN — MIDODRINE HYDROCHLORIDE 15 MG: 5 TABLET ORAL at 12:27

## 2022-01-01 RX ADMIN — INSULIN GLARGINE 25 UNITS: 100 INJECTION, SOLUTION SUBCUTANEOUS at 20:56

## 2022-01-01 RX ADMIN — VASOPRESSIN 0.04 UNITS/MIN: 20 INJECTION PARENTERAL at 19:15

## 2022-01-01 RX ADMIN — CARBOXYMETHYLCELLULOSE SODIUM 1 DROP: 10 GEL OPHTHALMIC at 21:01

## 2022-01-01 RX ADMIN — Medication: at 23:58

## 2022-01-01 RX ADMIN — ANORECTAL OINTMENT: 15.7; .44; 24; 20.6 OINTMENT TOPICAL at 08:49

## 2022-01-01 RX ADMIN — IPRATROPIUM BROMIDE AND ALBUTEROL SULFATE 1 AMPULE: 2.5; .5 SOLUTION RESPIRATORY (INHALATION) at 11:44

## 2022-01-01 RX ADMIN — IPRATROPIUM BROMIDE AND ALBUTEROL SULFATE 1 AMPULE: 2.5; .5 SOLUTION RESPIRATORY (INHALATION) at 11:54

## 2022-01-01 RX ADMIN — SODIUM CHLORIDE 30 MG/ML INHALATION SOLUTION 4 ML: 30 SOLUTION INHALANT at 07:25

## 2022-01-01 RX ADMIN — APIXABAN 5 MG: 5 TABLET, FILM COATED ORAL at 08:34

## 2022-01-01 RX ADMIN — INSULIN LISPRO 3 UNITS: 100 INJECTION, SOLUTION INTRAVENOUS; SUBCUTANEOUS at 04:19

## 2022-01-01 RX ADMIN — IPRATROPIUM BROMIDE AND ALBUTEROL SULFATE 1 AMPULE: 2.5; .5 SOLUTION RESPIRATORY (INHALATION) at 11:12

## 2022-01-01 RX ADMIN — OXYCODONE HYDROCHLORIDE AND ACETAMINOPHEN 1 TABLET: 5; 325 TABLET ORAL at 16:12

## 2022-01-01 RX ADMIN — Medication: at 19:23

## 2022-01-01 RX ADMIN — CHLORHEXIDINE GLUCONATE 0.12% ORAL RINSE 15 ML: 1.2 LIQUID ORAL at 19:59

## 2022-01-01 RX ADMIN — Medication: at 09:09

## 2022-01-01 RX ADMIN — ALBUMIN (HUMAN) 25 G: 0.25 INJECTION, SOLUTION INTRAVENOUS at 11:05

## 2022-01-01 RX ADMIN — WHITE PETROLATUM 57.7 %-MINERAL OIL 31.9 % EYE OINTMENT: at 10:18

## 2022-01-01 RX ADMIN — Medication: at 13:12

## 2022-01-01 RX ADMIN — ANORECTAL OINTMENT: 15.7; .44; 24; 20.6 OINTMENT TOPICAL at 11:00

## 2022-01-01 RX ADMIN — CALCIUM GLUCONATE 1000 MG: 98 INJECTION, SOLUTION INTRAVENOUS at 03:46

## 2022-01-01 RX ADMIN — CLINDAMYCIN IN 5 PERCENT DEXTROSE 900 MG: 18 INJECTION, SOLUTION INTRAVENOUS at 10:07

## 2022-01-01 RX ADMIN — KETAMINE HYDROCHLORIDE 0.1 MG/KG/HR: 50 INJECTION, SOLUTION INTRAMUSCULAR; INTRAVENOUS at 08:00

## 2022-01-01 RX ADMIN — SODIUM CHLORIDE, PRESERVATIVE FREE 10 ML: 5 INJECTION INTRAVENOUS at 20:18

## 2022-01-01 RX ADMIN — TOBRAMYCIN 300 MG: 300 SOLUTION RESPIRATORY (INHALATION) at 18:53

## 2022-01-01 RX ADMIN — SENNOSIDES 17.2 MG: 8.6 TABLET, COATED ORAL at 21:25

## 2022-01-01 RX ADMIN — MONTELUKAST SODIUM 10 MG: 10 TABLET, COATED ORAL at 08:16

## 2022-01-01 RX ADMIN — METOLAZONE 10 MG: 2.5 TABLET ORAL at 21:12

## 2022-01-01 RX ADMIN — Medication: at 06:38

## 2022-01-01 RX ADMIN — ASPIRIN 81 MG 81 MG: 81 TABLET ORAL at 20:16

## 2022-01-01 RX ADMIN — WHITE PETROLATUM 57.7 %-MINERAL OIL 31.9 % EYE OINTMENT: at 23:30

## 2022-01-01 RX ADMIN — DOCUSATE SODIUM 100 MG: 100 CAPSULE, LIQUID FILLED ORAL at 08:56

## 2022-01-01 RX ADMIN — CIPROFLOXACIN HYDROCHLORIDE 500 MG: 500 TABLET, FILM COATED ORAL at 21:17

## 2022-01-01 RX ADMIN — HYDROMORPHONE HYDROCHLORIDE 0.5 MG: 1 INJECTION, SOLUTION INTRAMUSCULAR; INTRAVENOUS; SUBCUTANEOUS at 11:03

## 2022-01-01 RX ADMIN — APIXABAN 5 MG: 5 TABLET, FILM COATED ORAL at 20:15

## 2022-01-01 RX ADMIN — IPRATROPIUM BROMIDE AND ALBUTEROL SULFATE 1 AMPULE: 2.5; .5 SOLUTION RESPIRATORY (INHALATION) at 07:51

## 2022-01-01 RX ADMIN — Medication: at 04:24

## 2022-01-01 RX ADMIN — Medication: at 20:49

## 2022-01-01 RX ADMIN — WATER: 1 INJECTION INTRAMUSCULAR; INTRAVENOUS; SUBCUTANEOUS at 05:40

## 2022-01-01 RX ADMIN — HYDROCORTISONE SODIUM SUCCINATE 100 MG: 100 INJECTION, POWDER, FOR SOLUTION INTRAMUSCULAR; INTRAVENOUS at 20:24

## 2022-01-01 RX ADMIN — INSULIN LISPRO 1 UNITS: 100 INJECTION, SOLUTION INTRAVENOUS; SUBCUTANEOUS at 21:58

## 2022-01-01 RX ADMIN — VANCOMYCIN HYDROCHLORIDE 1000 MG: 1 INJECTION, POWDER, LYOPHILIZED, FOR SOLUTION INTRAVENOUS at 06:02

## 2022-01-01 RX ADMIN — WHITE PETROLATUM 57.7 %-MINERAL OIL 31.9 % EYE OINTMENT: at 06:23

## 2022-01-01 RX ADMIN — INSULIN LISPRO 8 UNITS: 100 INJECTION, SOLUTION INTRAVENOUS; SUBCUTANEOUS at 16:54

## 2022-01-01 RX ADMIN — NOREPINEPHRINE BITARTRATE 10 MCG/MIN: 1 INJECTION INTRAVENOUS at 06:31

## 2022-01-01 RX ADMIN — TORSEMIDE 100 MG: 100 TABLET ORAL at 15:32

## 2022-01-01 RX ADMIN — IPRATROPIUM BROMIDE AND ALBUTEROL SULFATE 1 AMPULE: .5; 2.5 SOLUTION RESPIRATORY (INHALATION) at 07:56

## 2022-01-01 RX ADMIN — CARBOXYMETHYLCELLULOSE SODIUM 1 DROP: 10 GEL OPHTHALMIC at 17:10

## 2022-01-01 RX ADMIN — IPRATROPIUM BROMIDE AND ALBUTEROL SULFATE 1 AMPULE: 2.5; .5 SOLUTION RESPIRATORY (INHALATION) at 19:57

## 2022-01-01 RX ADMIN — MUPIROCIN: 20 OINTMENT TOPICAL at 20:19

## 2022-01-01 RX ADMIN — CEFTAZIDIME 1000 MG: 1 INJECTION, POWDER, FOR SOLUTION INTRAMUSCULAR; INTRAVENOUS at 14:09

## 2022-01-01 RX ADMIN — PANTOPRAZOLE SODIUM 40 MG: 40 INJECTION, POWDER, FOR SOLUTION INTRAVENOUS at 08:13

## 2022-01-01 RX ADMIN — DOBUTAMINE HYDROCHLORIDE 5 MCG/KG/MIN: 200 INJECTION INTRAVENOUS at 00:17

## 2022-01-01 RX ADMIN — STANDARDIZED SENNA CONCENTRATE AND DOCUSATE SODIUM 2 TABLET: 8.6; 5 TABLET ORAL at 07:33

## 2022-01-01 RX ADMIN — TRAZODONE HYDROCHLORIDE 50 MG: 50 TABLET ORAL at 20:45

## 2022-01-01 RX ADMIN — INSULIN GLARGINE 25 UNITS: 100 INJECTION, SOLUTION SUBCUTANEOUS at 21:26

## 2022-01-01 RX ADMIN — MAGNESIUM GLUCONATE 500 MG ORAL TABLET 800 MG: 500 TABLET ORAL at 09:28

## 2022-01-01 RX ADMIN — ASPIRIN 81 MG 81 MG: 81 TABLET ORAL at 22:10

## 2022-01-01 RX ADMIN — CALCIUM GLUCONATE 1000 MG: 98 INJECTION, SOLUTION INTRAVENOUS at 12:13

## 2022-01-01 RX ADMIN — CEFTRIAXONE SODIUM 1000 MG: 1 INJECTION, POWDER, FOR SOLUTION INTRAMUSCULAR; INTRAVENOUS at 10:15

## 2022-01-01 RX ADMIN — Medication: at 14:27

## 2022-01-01 RX ADMIN — HYDRALAZINE HYDROCHLORIDE 10 MG: 10 TABLET, FILM COATED ORAL at 21:24

## 2022-01-01 RX ADMIN — IPRATROPIUM BROMIDE AND ALBUTEROL SULFATE 1 AMPULE: .5; 2.5 SOLUTION RESPIRATORY (INHALATION) at 23:10

## 2022-01-01 RX ADMIN — MIDODRINE HYDROCHLORIDE 10 MG: 10 TABLET ORAL at 11:42

## 2022-01-01 RX ADMIN — Medication: at 21:00

## 2022-01-01 RX ADMIN — DEXTROSE MONOHYDRATE: 50 INJECTION, SOLUTION INTRAVENOUS at 02:46

## 2022-01-01 RX ADMIN — Medication 2950 UNITS/HR: at 16:05

## 2022-01-01 RX ADMIN — CEFTAZIDIME 1000 MG: 1 INJECTION, POWDER, FOR SOLUTION INTRAMUSCULAR; INTRAVENOUS at 08:34

## 2022-01-01 RX ADMIN — CARBOXYMETHYLCELLULOSE SODIUM 1 DROP: 10 GEL OPHTHALMIC at 08:47

## 2022-01-01 RX ADMIN — DOCUSATE SODIUM 100 MG: 100 CAPSULE, LIQUID FILLED ORAL at 20:27

## 2022-01-01 RX ADMIN — CEFTAZIDIME 1000 MG: 1 INJECTION, POWDER, FOR SOLUTION INTRAMUSCULAR; INTRAVENOUS at 16:27

## 2022-01-01 RX ADMIN — INSULIN GLARGINE 15 UNITS: 100 INJECTION, SOLUTION SUBCUTANEOUS at 21:11

## 2022-01-01 RX ADMIN — CEFTAZIDIME 2000 MG: 1 INJECTION, POWDER, FOR SOLUTION INTRAMUSCULAR; INTRAVENOUS at 17:33

## 2022-01-01 RX ADMIN — Medication: at 21:16

## 2022-01-01 RX ADMIN — CALCIUM GLUCONATE 1000 MG: 98 INJECTION, SOLUTION INTRAVENOUS at 10:22

## 2022-01-01 RX ADMIN — Medication 2 PUFF: at 19:59

## 2022-01-01 RX ADMIN — Medication: at 18:18

## 2022-01-01 RX ADMIN — NOREPINEPHRINE BITARTRATE 50 MCG/MIN: 1 INJECTION INTRAVENOUS at 12:28

## 2022-01-01 RX ADMIN — STANDARDIZED SENNA CONCENTRATE AND DOCUSATE SODIUM 2 TABLET: 8.6; 5 TABLET ORAL at 08:54

## 2022-01-01 RX ADMIN — FUROSEMIDE 80 MG: 10 INJECTION, SOLUTION INTRAMUSCULAR; INTRAVENOUS at 08:56

## 2022-01-01 RX ADMIN — Medication: at 16:58

## 2022-01-01 RX ADMIN — VANCOMYCIN HYDROCHLORIDE 1000 MG: 10 INJECTION, POWDER, LYOPHILIZED, FOR SOLUTION INTRAVENOUS at 08:11

## 2022-01-01 RX ADMIN — FENTANYL CITRATE 50 MCG: 50 INJECTION INTRAMUSCULAR; INTRAVENOUS at 11:33

## 2022-01-01 RX ADMIN — SODIUM CHLORIDE, PRESERVATIVE FREE 10 ML: 5 INJECTION INTRAVENOUS at 08:23

## 2022-01-01 RX ADMIN — FUROSEMIDE 20 MG/HR: 10 INJECTION, SOLUTION INTRAVENOUS at 17:06

## 2022-01-01 RX ADMIN — CARBOXYMETHYLCELLULOSE SODIUM 1 DROP: 10 GEL OPHTHALMIC at 02:55

## 2022-01-01 RX ADMIN — CHLORHEXIDINE GLUCONATE 0.12% ORAL RINSE 15 ML: 1.2 LIQUID ORAL at 08:47

## 2022-01-01 RX ADMIN — TRAZODONE HYDROCHLORIDE 50 MG: 50 TABLET ORAL at 20:00

## 2022-01-01 RX ADMIN — HYDROCORTISONE SODIUM SUCCINATE 100 MG: 100 INJECTION, POWDER, FOR SOLUTION INTRAMUSCULAR; INTRAVENOUS at 10:52

## 2022-01-01 RX ADMIN — Medication: at 08:08

## 2022-01-01 RX ADMIN — MAGNESIUM GLUCONATE 500 MG ORAL TABLET 400 MG: 500 TABLET ORAL at 22:30

## 2022-01-01 RX ADMIN — IPRATROPIUM BROMIDE AND ALBUTEROL SULFATE 1 AMPULE: .5; 2.5 SOLUTION RESPIRATORY (INHALATION) at 07:38

## 2022-01-01 RX ADMIN — HYDRALAZINE HYDROCHLORIDE 10 MG: 10 TABLET, FILM COATED ORAL at 22:10

## 2022-01-01 RX ADMIN — Medication 800 MG: at 12:06

## 2022-01-01 RX ADMIN — CEFTAZIDIME 1000 MG: 1 INJECTION, POWDER, FOR SOLUTION INTRAMUSCULAR; INTRAVENOUS at 05:28

## 2022-01-01 RX ADMIN — KETAMINE HYDROCHLORIDE 0.8 MG/KG/HR: 50 INJECTION, SOLUTION INTRAMUSCULAR; INTRAVENOUS at 23:52

## 2022-01-01 RX ADMIN — IPRATROPIUM BROMIDE AND ALBUTEROL SULFATE 1 AMPULE: 2.5; .5 SOLUTION RESPIRATORY (INHALATION) at 11:57

## 2022-01-01 RX ADMIN — ANORECTAL OINTMENT: 15.7; .44; 24; 20.6 OINTMENT TOPICAL at 12:07

## 2022-01-01 RX ADMIN — MIDODRINE HYDROCHLORIDE 10 MG: 10 TABLET ORAL at 13:57

## 2022-01-01 RX ADMIN — POLYETHYLENE GLYCOL (3350) 17 G: 17 POWDER, FOR SOLUTION ORAL at 08:24

## 2022-01-01 RX ADMIN — INSULIN LISPRO 4 UNITS: 100 INJECTION, SOLUTION INTRAVENOUS; SUBCUTANEOUS at 16:12

## 2022-01-01 RX ADMIN — IPRATROPIUM BROMIDE AND ALBUTEROL SULFATE 1 AMPULE: 2.5; .5 SOLUTION RESPIRATORY (INHALATION) at 23:30

## 2022-01-01 RX ADMIN — DEXTROSE MONOHYDRATE 100 ML/HR: 50 INJECTION, SOLUTION INTRAVENOUS at 05:18

## 2022-01-01 RX ADMIN — ANORECTAL OINTMENT: 15.7; .44; 24; 20.6 OINTMENT TOPICAL at 08:10

## 2022-01-01 RX ADMIN — TIGECYCLINE 50 MG: 50 INJECTION, POWDER, LYOPHILIZED, FOR SOLUTION INTRAVENOUS at 21:33

## 2022-01-01 RX ADMIN — TIGECYCLINE 50 MG: 50 INJECTION, POWDER, LYOPHILIZED, FOR SOLUTION INTRAVENOUS at 21:53

## 2022-01-01 RX ADMIN — ALTEPLASE 1 MG: 2.2 INJECTION, POWDER, LYOPHILIZED, FOR SOLUTION INTRAVENOUS at 05:54

## 2022-01-01 RX ADMIN — HYDRALAZINE HYDROCHLORIDE 10 MG: 10 TABLET, FILM COATED ORAL at 20:13

## 2022-01-01 RX ADMIN — Medication: at 19:00

## 2022-01-01 RX ADMIN — ACETAMINOPHEN 650 MG: 325 TABLET ORAL at 18:15

## 2022-01-01 RX ADMIN — NOREPINEPHRINE BITARTRATE 4 MCG/MIN: 1 INJECTION INTRAVENOUS at 04:07

## 2022-01-01 RX ADMIN — APIXABAN 2.5 MG: 5 TABLET, FILM COATED ORAL at 20:50

## 2022-01-01 RX ADMIN — OXYCODONE HYDROCHLORIDE AND ACETAMINOPHEN 1 TABLET: 5; 325 TABLET ORAL at 01:00

## 2022-01-01 RX ADMIN — OXYCODONE HYDROCHLORIDE AND ACETAMINOPHEN 1 TABLET: 7.5; 325 TABLET ORAL at 05:01

## 2022-01-01 RX ADMIN — Medication: at 17:55

## 2022-01-01 RX ADMIN — Medication 150 MG: at 19:37

## 2022-01-01 RX ADMIN — CHLORHEXIDINE GLUCONATE 0.12% ORAL RINSE 15 ML: 1.2 LIQUID ORAL at 07:29

## 2022-01-01 RX ADMIN — TOBRAMYCIN 300 MG: 300 SOLUTION RESPIRATORY (INHALATION) at 07:32

## 2022-01-01 RX ADMIN — IPRATROPIUM BROMIDE AND ALBUTEROL SULFATE 1 AMPULE: .5; 2.5 SOLUTION RESPIRATORY (INHALATION) at 03:17

## 2022-01-01 RX ADMIN — Medication: at 06:08

## 2022-01-01 RX ADMIN — IPRATROPIUM BROMIDE AND ALBUTEROL SULFATE 1 AMPULE: 2.5; .5 SOLUTION RESPIRATORY (INHALATION) at 07:55

## 2022-01-01 RX ADMIN — DOBUTAMINE HYDROCHLORIDE 5 MCG/KG/MIN: 200 INJECTION INTRAVENOUS at 16:12

## 2022-01-01 RX ADMIN — POLYETHYLENE GLYCOL (3350) 17 G: 17 POWDER, FOR SOLUTION ORAL at 08:39

## 2022-01-01 RX ADMIN — ANORECTAL OINTMENT: 15.7; .44; 24; 20.6 OINTMENT TOPICAL at 07:30

## 2022-01-01 RX ADMIN — Medication: at 11:31

## 2022-01-01 RX ADMIN — ASPIRIN 81 MG: 81 TABLET, CHEWABLE ORAL at 21:12

## 2022-01-01 RX ADMIN — NOREPINEPHRINE BITARTRATE 3.5 MCG/MIN: 1 INJECTION INTRAVENOUS at 07:55

## 2022-01-01 RX ADMIN — FUROSEMIDE 20 MG/HR: 10 INJECTION, SOLUTION INTRAVENOUS at 14:51

## 2022-01-01 RX ADMIN — POTASSIUM CHLORIDE 20 MEQ: 29.8 INJECTION INTRAVENOUS at 08:40

## 2022-01-01 RX ADMIN — SODIUM PHOSPHATE, MONOBASIC, MONOHYDRATE 6 MMOL: 276; 142 INJECTION, SOLUTION INTRAVENOUS at 10:27

## 2022-01-01 RX ADMIN — Medication: at 22:51

## 2022-01-01 RX ADMIN — IPRATROPIUM BROMIDE AND ALBUTEROL SULFATE 1 AMPULE: 2.5; .5 SOLUTION RESPIRATORY (INHALATION) at 11:40

## 2022-01-01 RX ADMIN — CARBOXYMETHYLCELLULOSE SODIUM 1 DROP: 10 GEL OPHTHALMIC at 14:50

## 2022-01-01 RX ADMIN — HYDROCORTISONE SODIUM SUCCINATE 100 MG: 100 INJECTION, POWDER, FOR SOLUTION INTRAMUSCULAR; INTRAVENOUS at 18:24

## 2022-01-01 RX ADMIN — CLINDAMYCIN IN 5 PERCENT DEXTROSE 900 MG: 18 INJECTION, SOLUTION INTRAVENOUS at 09:38

## 2022-01-01 RX ADMIN — TIGECYCLINE 50 MG: 50 INJECTION, POWDER, LYOPHILIZED, FOR SOLUTION INTRAVENOUS at 23:11

## 2022-01-01 RX ADMIN — VASOPRESSIN 0.03 UNITS/MIN: 20 INJECTION PARENTERAL at 00:57

## 2022-01-01 RX ADMIN — Medication: at 21:37

## 2022-01-01 RX ADMIN — Medication: at 23:06

## 2022-01-01 RX ADMIN — Medication: at 18:23

## 2022-01-01 RX ADMIN — TRAZODONE HYDROCHLORIDE 50 MG: 50 TABLET ORAL at 20:13

## 2022-01-01 RX ADMIN — ANORECTAL OINTMENT: 15.7; .44; 24; 20.6 OINTMENT TOPICAL at 08:06

## 2022-01-01 RX ADMIN — CHLORHEXIDINE GLUCONATE 0.12% ORAL RINSE 15 ML: 1.2 LIQUID ORAL at 08:08

## 2022-01-01 RX ADMIN — INSULIN LISPRO 4 UNITS: 100 INJECTION, SOLUTION INTRAVENOUS; SUBCUTANEOUS at 17:20

## 2022-01-01 RX ADMIN — IPRATROPIUM BROMIDE AND ALBUTEROL SULFATE 1 AMPULE: .5; 2.5 SOLUTION RESPIRATORY (INHALATION) at 23:20

## 2022-01-01 RX ADMIN — Medication: at 08:15

## 2022-01-01 RX ADMIN — POTASSIUM CHLORIDE 20 MEQ: 29.8 INJECTION INTRAVENOUS at 06:48

## 2022-01-01 RX ADMIN — WHITE PETROLATUM 57.7 %-MINERAL OIL 31.9 % EYE OINTMENT: at 03:05

## 2022-01-01 RX ADMIN — CHLORHEXIDINE GLUCONATE 0.12% ORAL RINSE 15 ML: 1.2 LIQUID ORAL at 07:56

## 2022-01-01 RX ADMIN — MIDAZOLAM HYDROCHLORIDE 0.5 MG: 5 INJECTION INTRAMUSCULAR; INTRAVENOUS at 15:44

## 2022-01-01 RX ADMIN — SENNOSIDES 17.2 MG: 8.6 TABLET, COATED ORAL at 20:13

## 2022-01-01 RX ADMIN — IPRATROPIUM BROMIDE AND ALBUTEROL SULFATE 1 AMPULE: .5; 2.5 SOLUTION RESPIRATORY (INHALATION) at 08:05

## 2022-01-01 RX ADMIN — INSULIN LISPRO 8 UNITS: 100 INJECTION, SOLUTION INTRAVENOUS; SUBCUTANEOUS at 08:46

## 2022-01-01 RX ADMIN — WHITE PETROLATUM 57.7 %-MINERAL OIL 31.9 % EYE OINTMENT: at 10:28

## 2022-01-01 RX ADMIN — DOCUSATE SODIUM 100 MG: 100 CAPSULE, LIQUID FILLED ORAL at 21:01

## 2022-01-01 RX ADMIN — Medication: at 14:08

## 2022-01-01 RX ADMIN — HYDROCORTISONE SODIUM SUCCINATE 100 MG: 100 INJECTION, POWDER, FOR SOLUTION INTRAMUSCULAR; INTRAVENOUS at 18:00

## 2022-01-01 RX ADMIN — IPRATROPIUM BROMIDE AND ALBUTEROL SULFATE 1 AMPULE: .5; 2.5 SOLUTION RESPIRATORY (INHALATION) at 11:06

## 2022-01-01 RX ADMIN — OXYCODONE HYDROCHLORIDE AND ACETAMINOPHEN 1 TABLET: 5; 325 TABLET ORAL at 10:03

## 2022-01-01 RX ADMIN — Medication 1050 UNITS/HR: at 17:08

## 2022-01-01 RX ADMIN — IPRATROPIUM BROMIDE AND ALBUTEROL SULFATE 1 AMPULE: 2.5; .5 SOLUTION RESPIRATORY (INHALATION) at 02:57

## 2022-01-01 RX ADMIN — IPRATROPIUM BROMIDE AND ALBUTEROL SULFATE 1 AMPULE: .5; 2.5 SOLUTION RESPIRATORY (INHALATION) at 11:48

## 2022-01-01 RX ADMIN — SODIUM ZIRCONIUM CYCLOSILICATE 10 G: 10 POWDER, FOR SUSPENSION ORAL at 15:53

## 2022-01-01 RX ADMIN — Medication 2 PUFF: at 08:06

## 2022-01-01 RX ADMIN — CALCIUM GLUCONATE 1000 MG: 98 INJECTION, SOLUTION INTRAVENOUS at 21:53

## 2022-01-01 RX ADMIN — INSULIN LISPRO 3 UNITS: 100 INJECTION, SOLUTION INTRAVENOUS; SUBCUTANEOUS at 04:38

## 2022-01-01 RX ADMIN — WHITE PETROLATUM 57.7 %-MINERAL OIL 31.9 % EYE OINTMENT: at 05:17

## 2022-01-01 RX ADMIN — INSULIN LISPRO 5 UNITS: 100 INJECTION, SOLUTION INTRAVENOUS; SUBCUTANEOUS at 21:48

## 2022-01-01 RX ADMIN — HYDRALAZINE HYDROCHLORIDE 10 MG: 10 TABLET, FILM COATED ORAL at 12:14

## 2022-01-01 RX ADMIN — HEPARIN SODIUM 1050 UNITS/HR: 10000 INJECTION, SOLUTION INTRAVENOUS; SUBCUTANEOUS at 10:41

## 2022-01-01 RX ADMIN — APIXABAN 5 MG: 5 TABLET, FILM COATED ORAL at 20:00

## 2022-01-01 RX ADMIN — CALCIUM GLUCONATE 1000 MG: 98 INJECTION, SOLUTION INTRAVENOUS at 03:11

## 2022-01-01 RX ADMIN — Medication 2 PUFF: at 19:33

## 2022-01-01 RX ADMIN — IMMUNE GLOBULIN (HUMAN) 30 G: 10 INJECTION INTRAVENOUS; SUBCUTANEOUS at 23:46

## 2022-01-01 RX ADMIN — SODIUM CHLORIDE, PRESERVATIVE FREE 10 ML: 5 INJECTION INTRAVENOUS at 20:57

## 2022-01-01 RX ADMIN — METOPROLOL SUCCINATE 25 MG: 25 TABLET, FILM COATED, EXTENDED RELEASE ORAL at 21:10

## 2022-01-01 RX ADMIN — CARBOXYMETHYLCELLULOSE SODIUM 1 DROP: 10 GEL OPHTHALMIC at 10:28

## 2022-01-01 RX ADMIN — EPOETIN ALFA-EPBX 3000 UNITS: 3000 INJECTION, SOLUTION INTRAVENOUS; SUBCUTANEOUS at 09:00

## 2022-01-01 RX ADMIN — KETAMINE HYDROCHLORIDE 0.7 MG/KG/HR: 50 INJECTION, SOLUTION INTRAMUSCULAR; INTRAVENOUS at 02:43

## 2022-01-01 RX ADMIN — INSULIN LISPRO 2 UNITS: 100 INJECTION, SOLUTION INTRAVENOUS; SUBCUTANEOUS at 09:07

## 2022-01-01 RX ADMIN — INSULIN GLARGINE 33 UNITS: 100 INJECTION, SOLUTION SUBCUTANEOUS at 08:52

## 2022-01-01 RX ADMIN — SODIUM PHOSPHATE, MONOBASIC, MONOHYDRATE 6 MMOL: 276; 142 INJECTION, SOLUTION INTRAVENOUS at 18:12

## 2022-01-01 RX ADMIN — STANDARDIZED SENNA CONCENTRATE AND DOCUSATE SODIUM 2 TABLET: 8.6; 5 TABLET ORAL at 08:26

## 2022-01-01 RX ADMIN — ANORECTAL OINTMENT: 15.7; .44; 24; 20.6 OINTMENT TOPICAL at 08:09

## 2022-01-01 RX ADMIN — Medication: at 02:46

## 2022-01-01 RX ADMIN — CEFTAZIDIME 1000 MG: 1 INJECTION, POWDER, FOR SOLUTION INTRAMUSCULAR; INTRAVENOUS at 14:25

## 2022-01-01 RX ADMIN — VASOPRESSIN 0.03 UNITS/MIN: 20 INJECTION PARENTERAL at 05:11

## 2022-01-01 RX ADMIN — ANORECTAL OINTMENT: 15.7; .44; 24; 20.6 OINTMENT TOPICAL at 20:14

## 2022-01-01 RX ADMIN — ANORECTAL OINTMENT: 15.7; .44; 24; 20.6 OINTMENT TOPICAL at 20:26

## 2022-01-01 RX ADMIN — MUPIROCIN: 20 OINTMENT TOPICAL at 07:27

## 2022-01-01 RX ADMIN — Medication: at 03:07

## 2022-01-01 RX ADMIN — INSULIN LISPRO 6 UNITS: 100 INJECTION, SOLUTION INTRAVENOUS; SUBCUTANEOUS at 08:21

## 2022-01-01 RX ADMIN — INSULIN LISPRO 2 UNITS: 100 INJECTION, SOLUTION INTRAVENOUS; SUBCUTANEOUS at 04:02

## 2022-01-01 RX ADMIN — Medication: at 01:55

## 2022-01-01 RX ADMIN — PERFLUTREN 1.3 ML: 6.52 INJECTION, SUSPENSION INTRAVENOUS at 14:13

## 2022-01-01 RX ADMIN — SODIUM CHLORIDE, PRESERVATIVE FREE 10 ML: 5 INJECTION INTRAVENOUS at 20:20

## 2022-01-01 RX ADMIN — INSULIN LISPRO 2 UNITS: 100 INJECTION, SOLUTION INTRAVENOUS; SUBCUTANEOUS at 09:44

## 2022-01-01 RX ADMIN — CHLORHEXIDINE GLUCONATE 0.12% ORAL RINSE 15 ML: 1.2 LIQUID ORAL at 08:55

## 2022-01-01 RX ADMIN — KETAMINE HYDROCHLORIDE 0.8 MG/KG/HR: 50 INJECTION, SOLUTION INTRAMUSCULAR; INTRAVENOUS at 11:08

## 2022-01-01 RX ADMIN — KETAMINE HYDROCHLORIDE 0.6 MG/KG/HR: 50 INJECTION, SOLUTION INTRAMUSCULAR; INTRAVENOUS at 12:10

## 2022-01-01 RX ADMIN — TRAZODONE HYDROCHLORIDE 50 MG: 50 TABLET ORAL at 21:01

## 2022-01-01 RX ADMIN — INSULIN LISPRO 6 UNITS: 100 INJECTION, SOLUTION INTRAVENOUS; SUBCUTANEOUS at 08:15

## 2022-01-01 RX ADMIN — IPRATROPIUM BROMIDE AND ALBUTEROL SULFATE 1 AMPULE: 2.5; .5 SOLUTION RESPIRATORY (INHALATION) at 11:47

## 2022-01-01 RX ADMIN — INSULIN GLARGINE 30 UNITS: 100 INJECTION, SOLUTION SUBCUTANEOUS at 10:03

## 2022-01-01 RX ADMIN — SODIUM CHLORIDE, PRESERVATIVE FREE 10 ML: 5 INJECTION INTRAVENOUS at 08:22

## 2022-01-01 RX ADMIN — LIDOCAINE HYDROCHLORIDE: 40 SOLUTION TOPICAL at 11:04

## 2022-01-01 RX ADMIN — APIXABAN 2.5 MG: 5 TABLET, FILM COATED ORAL at 20:01

## 2022-01-01 RX ADMIN — WHITE PETROLATUM 57.7 %-MINERAL OIL 31.9 % EYE OINTMENT: at 00:04

## 2022-01-01 RX ADMIN — TOBRAMYCIN 300 MG: 300 SOLUTION RESPIRATORY (INHALATION) at 08:22

## 2022-01-01 RX ADMIN — ACETAMINOPHEN 650 MG: 325 TABLET ORAL at 20:14

## 2022-01-01 RX ADMIN — INSULIN LISPRO 3 UNITS: 100 INJECTION, SOLUTION INTRAVENOUS; SUBCUTANEOUS at 00:00

## 2022-01-01 RX ADMIN — MIDODRINE HYDROCHLORIDE 10 MG: 10 TABLET ORAL at 07:52

## 2022-01-01 RX ADMIN — SODIUM PHOSPHATE, MONOBASIC, MONOHYDRATE 12 MMOL: 276; 142 INJECTION, SOLUTION INTRAVENOUS at 11:57

## 2022-01-01 RX ADMIN — INSULIN LISPRO 3 UNITS: 100 INJECTION, SOLUTION INTRAVENOUS; SUBCUTANEOUS at 20:16

## 2022-01-01 RX ADMIN — DOBUTAMINE 5 MCG/KG/MIN: 12.5 INJECTION, SOLUTION, CONCENTRATE INTRAVENOUS at 11:17

## 2022-01-01 RX ADMIN — APIXABAN 2.5 MG: 5 TABLET, FILM COATED ORAL at 08:39

## 2022-01-01 RX ADMIN — CIPROFLOXACIN HYDROCHLORIDE 500 MG: 500 TABLET, FILM COATED ORAL at 08:28

## 2022-01-01 RX ADMIN — PANTOPRAZOLE SODIUM 40 MG: 40 INJECTION, POWDER, FOR SOLUTION INTRAVENOUS at 09:11

## 2022-01-01 RX ADMIN — CEFTAZIDIME 1000 MG: 1 INJECTION, POWDER, FOR SOLUTION INTRAMUSCULAR; INTRAVENOUS at 06:32

## 2022-01-01 RX ADMIN — IPRATROPIUM BROMIDE AND ALBUTEROL SULFATE 1 AMPULE: .5; 2.5 SOLUTION RESPIRATORY (INHALATION) at 16:03

## 2022-01-01 RX ADMIN — PANTOPRAZOLE SODIUM 40 MG: 40 INJECTION, POWDER, FOR SOLUTION INTRAVENOUS at 14:43

## 2022-01-01 RX ADMIN — CALCIUM GLUCONATE 2000 MG: 98 INJECTION, SOLUTION INTRAVENOUS at 12:02

## 2022-01-01 RX ADMIN — MIDAZOLAM HYDROCHLORIDE 2 MG: 2 INJECTION, SOLUTION INTRAMUSCULAR; INTRAVENOUS at 15:14

## 2022-01-01 RX ADMIN — Medication: at 02:15

## 2022-01-01 RX ADMIN — INSULIN GLARGINE 45 UNITS: 100 INJECTION, SOLUTION SUBCUTANEOUS at 08:40

## 2022-01-01 RX ADMIN — IPRATROPIUM BROMIDE AND ALBUTEROL SULFATE 1 AMPULE: 2.5; .5 SOLUTION RESPIRATORY (INHALATION) at 16:04

## 2022-01-01 RX ADMIN — INSULIN GLARGINE 25 UNITS: 100 INJECTION, SOLUTION SUBCUTANEOUS at 09:20

## 2022-01-01 RX ADMIN — MUPIROCIN: 20 OINTMENT TOPICAL at 20:00

## 2022-01-01 RX ADMIN — SODIUM CHLORIDE, PRESERVATIVE FREE 10 ML: 5 INJECTION INTRAVENOUS at 20:47

## 2022-01-01 RX ADMIN — INSULIN GLARGINE 33 UNITS: 100 INJECTION, SOLUTION SUBCUTANEOUS at 12:07

## 2022-01-01 RX ADMIN — PANTOPRAZOLE SODIUM 40 MG: 40 INJECTION, POWDER, FOR SOLUTION INTRAVENOUS at 10:11

## 2022-01-01 RX ADMIN — OXYCODONE HYDROCHLORIDE AND ACETAMINOPHEN 1 TABLET: 7.5; 325 TABLET ORAL at 06:30

## 2022-01-01 RX ADMIN — SODIUM CHLORIDE, PRESERVATIVE FREE 10 ML: 5 INJECTION INTRAVENOUS at 20:02

## 2022-01-01 RX ADMIN — ROCURONIUM BROMIDE 50 MG: 10 INJECTION, SOLUTION INTRAVENOUS at 09:25

## 2022-01-01 RX ADMIN — STANDARDIZED SENNA CONCENTRATE AND DOCUSATE SODIUM 2 TABLET: 8.6; 5 TABLET ORAL at 20:35

## 2022-01-01 RX ADMIN — APIXABAN 5 MG: 5 TABLET, FILM COATED ORAL at 21:24

## 2022-01-01 RX ADMIN — CIPROFLOXACIN HYDROCHLORIDE 500 MG: 500 TABLET, FILM COATED ORAL at 20:27

## 2022-01-01 RX ADMIN — INSULIN GLARGINE 30 UNITS: 100 INJECTION, SOLUTION SUBCUTANEOUS at 21:07

## 2022-01-01 RX ADMIN — CETIRIZINE HYDROCHLORIDE 10 MG: 10 TABLET ORAL at 09:09

## 2022-01-01 RX ADMIN — INSULIN LISPRO 9 UNITS: 100 INJECTION, SOLUTION INTRAVENOUS; SUBCUTANEOUS at 00:23

## 2022-01-01 RX ADMIN — SODIUM CHLORIDE: 9 INJECTION, SOLUTION INTRAVENOUS at 05:21

## 2022-01-01 RX ADMIN — CALCIUM GLUCONATE 1000 MG: 98 INJECTION, SOLUTION INTRAVENOUS at 16:10

## 2022-01-01 RX ADMIN — INSULIN LISPRO 2 UNITS: 100 INJECTION, SOLUTION INTRAVENOUS; SUBCUTANEOUS at 17:31

## 2022-01-01 RX ADMIN — STANDARDIZED SENNA CONCENTRATE AND DOCUSATE SODIUM 2 TABLET: 8.6; 5 TABLET ORAL at 07:58

## 2022-01-01 RX ADMIN — INSULIN LISPRO 3 UNITS: 100 INJECTION, SOLUTION INTRAVENOUS; SUBCUTANEOUS at 04:30

## 2022-01-01 RX ADMIN — STANDARDIZED SENNA CONCENTRATE AND DOCUSATE SODIUM 2 TABLET: 8.6; 5 TABLET ORAL at 08:25

## 2022-01-01 RX ADMIN — SENNOSIDES 17.2 MG: 8.6 TABLET, COATED ORAL at 20:57

## 2022-01-01 RX ADMIN — CHLORHEXIDINE GLUCONATE 0.12% ORAL RINSE 15 ML: 1.2 LIQUID ORAL at 19:54

## 2022-01-01 RX ADMIN — MIDODRINE HYDROCHLORIDE 10 MG: 10 TABLET ORAL at 12:45

## 2022-01-01 RX ADMIN — WHITE PETROLATUM 57.7 %-MINERAL OIL 31.9 % EYE OINTMENT: at 19:47

## 2022-01-01 RX ADMIN — ASPIRIN 81 MG 81 MG: 81 TABLET ORAL at 20:01

## 2022-01-01 RX ADMIN — ANORECTAL OINTMENT: 15.7; .44; 24; 20.6 OINTMENT TOPICAL at 09:05

## 2022-01-01 RX ADMIN — ALBUMIN (HUMAN) 25 G: 0.25 INJECTION, SOLUTION INTRAVENOUS at 18:19

## 2022-01-01 RX ADMIN — CARBOXYMETHYLCELLULOSE SODIUM 1 DROP: 10 GEL OPHTHALMIC at 09:12

## 2022-01-01 RX ADMIN — CIPROFLOXACIN HYDROCHLORIDE 500 MG: 500 TABLET, FILM COATED ORAL at 08:32

## 2022-01-01 RX ADMIN — MEROPENEM 1000 MG: 1 INJECTION, POWDER, FOR SOLUTION INTRAVENOUS at 03:43

## 2022-01-01 RX ADMIN — MIDAZOLAM HYDROCHLORIDE 2 MG: 2 INJECTION, SOLUTION INTRAMUSCULAR; INTRAVENOUS at 01:24

## 2022-01-01 RX ADMIN — Medication 150 MG: at 03:26

## 2022-01-01 RX ADMIN — Medication: at 19:15

## 2022-01-01 RX ADMIN — Medication: at 10:16

## 2022-01-01 RX ADMIN — IPRATROPIUM BROMIDE AND ALBUTEROL SULFATE 1 AMPULE: 2.5; .5 SOLUTION RESPIRATORY (INHALATION) at 16:05

## 2022-01-01 RX ADMIN — SODIUM PHOSPHATE, MONOBASIC, MONOHYDRATE 6 MMOL: 276; 142 INJECTION, SOLUTION INTRAVENOUS at 07:27

## 2022-01-01 RX ADMIN — MAGNESIUM GLUCONATE 500 MG ORAL TABLET 800 MG: 500 TABLET ORAL at 20:14

## 2022-01-01 RX ADMIN — TRAZODONE HYDROCHLORIDE 50 MG: 50 TABLET ORAL at 20:27

## 2022-01-01 RX ADMIN — INSULIN LISPRO 6 UNITS: 100 INJECTION, SOLUTION INTRAVENOUS; SUBCUTANEOUS at 17:38

## 2022-01-01 RX ADMIN — TOBRAMYCIN 300 MG: 300 SOLUTION RESPIRATORY (INHALATION) at 08:06

## 2022-01-01 RX ADMIN — POLYETHYLENE GLYCOL (3350) 17 G: 17 POWDER, FOR SOLUTION ORAL at 09:53

## 2022-01-01 RX ADMIN — Medication: at 11:03

## 2022-01-01 RX ADMIN — Medication 2 PUFF: at 19:31

## 2022-01-01 RX ADMIN — CIPROFLOXACIN HYDROCHLORIDE 500 MG: 500 TABLET, FILM COATED ORAL at 08:43

## 2022-01-01 RX ADMIN — VANCOMYCIN HYDROCHLORIDE 1250 MG: 10 INJECTION, POWDER, LYOPHILIZED, FOR SOLUTION INTRAVENOUS at 08:25

## 2022-01-01 RX ADMIN — SODIUM CHLORIDE, PRESERVATIVE FREE 10 ML: 5 INJECTION INTRAVENOUS at 20:58

## 2022-01-01 RX ADMIN — ACETAMINOPHEN 650 MG: 325 TABLET ORAL at 04:53

## 2022-01-01 RX ADMIN — SODIUM PHOSPHATE, MONOBASIC, MONOHYDRATE 6 MMOL: 276; 142 INJECTION, SOLUTION INTRAVENOUS at 18:02

## 2022-01-01 RX ADMIN — MIDAZOLAM HYDROCHLORIDE 2 MG: 2 INJECTION, SOLUTION INTRAMUSCULAR; INTRAVENOUS at 17:23

## 2022-01-01 RX ADMIN — HYDROCORTISONE SODIUM SUCCINATE 100 MG: 100 INJECTION, POWDER, FOR SOLUTION INTRAMUSCULAR; INTRAVENOUS at 11:04

## 2022-01-01 RX ADMIN — IPRATROPIUM BROMIDE AND ALBUTEROL SULFATE 1 AMPULE: 2.5; .5 SOLUTION RESPIRATORY (INHALATION) at 16:37

## 2022-01-01 RX ADMIN — INSULIN GLARGINE 33 UNITS: 100 INJECTION, SOLUTION SUBCUTANEOUS at 20:17

## 2022-01-01 RX ADMIN — STANDARDIZED SENNA CONCENTRATE AND DOCUSATE SODIUM 2 TABLET: 8.6; 5 TABLET ORAL at 20:15

## 2022-01-01 RX ADMIN — MIDODRINE HYDROCHLORIDE 15 MG: 5 TABLET ORAL at 17:47

## 2022-01-01 RX ADMIN — PHYTONADIONE 10 MG: 10 INJECTION, EMULSION INTRAMUSCULAR; INTRAVENOUS; SUBCUTANEOUS at 11:05

## 2022-01-01 RX ADMIN — Medication: at 22:20

## 2022-01-01 RX ADMIN — POLYETHYLENE GLYCOL (3350) 17 G: 17 POWDER, FOR SOLUTION ORAL at 08:56

## 2022-01-01 RX ADMIN — MEROPENEM 1000 MG: 1 INJECTION, POWDER, FOR SOLUTION INTRAVENOUS at 16:22

## 2022-01-01 RX ADMIN — Medication 150 MG: at 18:56

## 2022-01-01 RX ADMIN — Medication: at 00:59

## 2022-01-01 RX ADMIN — CEFTRIAXONE SODIUM 1000 MG: 1 INJECTION, POWDER, FOR SOLUTION INTRAMUSCULAR; INTRAVENOUS at 07:44

## 2022-01-01 RX ADMIN — SODIUM PHOSPHATE, MONOBASIC, MONOHYDRATE 6 MMOL: 276; 142 INJECTION, SOLUTION INTRAVENOUS at 18:40

## 2022-01-01 RX ADMIN — CARBOXYMETHYLCELLULOSE SODIUM 1 DROP: 10 GEL OPHTHALMIC at 12:17

## 2022-01-01 RX ADMIN — INSULIN LISPRO 3 UNITS: 100 INJECTION, SOLUTION INTRAVENOUS; SUBCUTANEOUS at 11:58

## 2022-01-01 RX ADMIN — Medication: at 13:03

## 2022-01-01 RX ADMIN — INSULIN LISPRO 6 UNITS: 100 INJECTION, SOLUTION INTRAVENOUS; SUBCUTANEOUS at 21:14

## 2022-01-01 RX ADMIN — INSULIN LISPRO 6 UNITS: 100 INJECTION, SOLUTION INTRAVENOUS; SUBCUTANEOUS at 00:04

## 2022-01-01 RX ADMIN — COLLAGENASE SANTYL: 250 OINTMENT TOPICAL at 12:06

## 2022-01-01 RX ADMIN — ACETAMINOPHEN 650 MG: 325 TABLET ORAL at 22:54

## 2022-01-01 RX ADMIN — IPRATROPIUM BROMIDE AND ALBUTEROL SULFATE 1 AMPULE: .5; 2.5 SOLUTION RESPIRATORY (INHALATION) at 11:10

## 2022-01-01 RX ADMIN — ANORECTAL OINTMENT: 15.7; .44; 24; 20.6 OINTMENT TOPICAL at 10:24

## 2022-01-01 RX ADMIN — IPRATROPIUM BROMIDE AND ALBUTEROL SULFATE 1 AMPULE: .5; 2.5 SOLUTION RESPIRATORY (INHALATION) at 03:18

## 2022-01-01 RX ADMIN — COLLAGENASE SANTYL: 250 OINTMENT TOPICAL at 08:15

## 2022-01-01 RX ADMIN — Medication: at 13:24

## 2022-01-01 RX ADMIN — STANDARDIZED SENNA CONCENTRATE AND DOCUSATE SODIUM 2 TABLET: 8.6; 5 TABLET ORAL at 21:29

## 2022-01-01 RX ADMIN — MUPIROCIN: 20 OINTMENT TOPICAL at 07:55

## 2022-01-01 RX ADMIN — INSULIN GLARGINE 33 UNITS: 100 INJECTION, SOLUTION SUBCUTANEOUS at 09:07

## 2022-01-01 RX ADMIN — INSULIN LISPRO 2 UNITS: 100 INJECTION, SOLUTION INTRAVENOUS; SUBCUTANEOUS at 21:08

## 2022-01-01 RX ADMIN — ASPIRIN 81 MG 81 MG: 81 TABLET ORAL at 20:23

## 2022-01-01 RX ADMIN — Medication 150 MG: at 19:13

## 2022-01-01 RX ADMIN — FENTANYL CITRATE 50 MCG: 50 INJECTION INTRAMUSCULAR; INTRAVENOUS at 07:00

## 2022-01-01 RX ADMIN — VASOPRESSIN 0.04 UNITS/MIN: 20 INJECTION PARENTERAL at 00:03

## 2022-01-01 RX ADMIN — MIDAZOLAM HYDROCHLORIDE 2 MG: 2 INJECTION, SOLUTION INTRAMUSCULAR; INTRAVENOUS at 06:06

## 2022-01-01 RX ADMIN — Medication: at 23:20

## 2022-01-01 RX ADMIN — CEFTAZIDIME 1000 MG: 1 INJECTION, POWDER, FOR SOLUTION INTRAMUSCULAR; INTRAVENOUS at 04:27

## 2022-01-01 RX ADMIN — MIDAZOLAM HYDROCHLORIDE 2 MG: 2 INJECTION, SOLUTION INTRAMUSCULAR; INTRAVENOUS at 15:06

## 2022-01-01 RX ADMIN — Medication 150 MG: at 10:48

## 2022-01-01 RX ADMIN — SODIUM CHLORIDE, PRESERVATIVE FREE 10 ML: 5 INJECTION INTRAVENOUS at 20:06

## 2022-01-01 RX ADMIN — SODIUM PHOSPHATE, MONOBASIC, MONOHYDRATE 6 MMOL: 276; 142 INJECTION, SOLUTION INTRAVENOUS at 16:30

## 2022-01-01 RX ADMIN — CEFTAZIDIME 1000 MG: 1 INJECTION, POWDER, FOR SOLUTION INTRAMUSCULAR; INTRAVENOUS at 14:04

## 2022-01-01 RX ADMIN — Medication: at 07:13

## 2022-01-01 RX ADMIN — MIDODRINE HYDROCHLORIDE 10 MG: 10 TABLET ORAL at 08:08

## 2022-01-01 RX ADMIN — ANORECTAL OINTMENT: 15.7; .44; 24; 20.6 OINTMENT TOPICAL at 20:19

## 2022-01-01 RX ADMIN — IPRATROPIUM BROMIDE AND ALBUTEROL SULFATE 1 AMPULE: 2.5; .5 SOLUTION RESPIRATORY (INHALATION) at 22:38

## 2022-01-01 RX ADMIN — IPRATROPIUM BROMIDE AND ALBUTEROL SULFATE 1 AMPULE: 2.5; .5 SOLUTION RESPIRATORY (INHALATION) at 03:07

## 2022-01-01 RX ADMIN — SENNOSIDES 17.2 MG: 8.6 TABLET, COATED ORAL at 21:16

## 2022-01-01 RX ADMIN — SODIUM PHOSPHATE, MONOBASIC, MONOHYDRATE 30 MMOL: 276; 142 INJECTION, SOLUTION INTRAVENOUS at 18:20

## 2022-01-01 RX ADMIN — MIDAZOLAM HYDROCHLORIDE 2 MG: 2 INJECTION, SOLUTION INTRAMUSCULAR; INTRAVENOUS at 01:43

## 2022-01-01 RX ADMIN — FERROUS SULFATE TAB 325 MG (65 MG ELEMENTAL FE) 325 MG: 325 (65 FE) TAB at 08:56

## 2022-01-01 RX ADMIN — SPIRONOLACTONE 50 MG: 25 TABLET ORAL at 09:09

## 2022-01-01 RX ADMIN — Medication: at 07:47

## 2022-01-01 RX ADMIN — APIXABAN 5 MG: 5 TABLET, FILM COATED ORAL at 10:12

## 2022-01-01 RX ADMIN — STANDARDIZED SENNA CONCENTRATE AND DOCUSATE SODIUM 2 TABLET: 8.6; 5 TABLET ORAL at 07:56

## 2022-01-01 RX ADMIN — ANORECTAL OINTMENT: 15.7; .44; 24; 20.6 OINTMENT TOPICAL at 21:24

## 2022-01-01 RX ADMIN — SODIUM CHLORIDE 25 ML: 9 INJECTION, SOLUTION INTRAVENOUS at 13:49

## 2022-01-01 RX ADMIN — FENTANYL CITRATE 50 MCG: 50 INJECTION, SOLUTION INTRAMUSCULAR; INTRAVENOUS at 07:12

## 2022-01-01 RX ADMIN — FERROUS SULFATE TAB 325 MG (65 MG ELEMENTAL FE) 325 MG: 325 (65 FE) TAB at 08:15

## 2022-01-01 RX ADMIN — ASPIRIN 81 MG 81 MG: 81 TABLET ORAL at 20:58

## 2022-01-01 RX ADMIN — INSULIN LISPRO 2 UNITS: 100 INJECTION, SOLUTION INTRAVENOUS; SUBCUTANEOUS at 18:14

## 2022-01-01 RX ADMIN — STANDARDIZED SENNA CONCENTRATE AND DOCUSATE SODIUM 2 TABLET: 8.6; 5 TABLET ORAL at 20:23

## 2022-01-01 RX ADMIN — APIXABAN 2.5 MG: 5 TABLET, FILM COATED ORAL at 21:01

## 2022-01-01 RX ADMIN — HYDRALAZINE HYDROCHLORIDE 10 MG: 10 TABLET, FILM COATED ORAL at 21:11

## 2022-01-01 RX ADMIN — INSULIN LISPRO 3 UNITS: 100 INJECTION, SOLUTION INTRAVENOUS; SUBCUTANEOUS at 13:10

## 2022-01-01 RX ADMIN — APIXABAN 5 MG: 5 TABLET, FILM COATED ORAL at 09:09

## 2022-01-01 RX ADMIN — DOCUSATE SODIUM 100 MG: 100 CAPSULE, LIQUID FILLED ORAL at 22:10

## 2022-01-01 RX ADMIN — SODIUM CHLORIDE: 9 INJECTION, SOLUTION INTRAVENOUS at 02:00

## 2022-01-01 RX ADMIN — APIXABAN 5 MG: 5 TABLET, FILM COATED ORAL at 08:54

## 2022-01-01 RX ADMIN — Medication: at 01:18

## 2022-01-01 RX ADMIN — Medication: at 08:00

## 2022-01-01 RX ADMIN — CALCIUM GLUCONATE 1000 MG: 98 INJECTION, SOLUTION INTRAVENOUS at 00:42

## 2022-01-01 RX ADMIN — WHITE PETROLATUM 57.7 %-MINERAL OIL 31.9 % EYE OINTMENT: at 07:55

## 2022-01-01 RX ADMIN — VASOPRESSIN 0.03 UNITS/MIN: 20 INJECTION INTRAVENOUS at 19:14

## 2022-01-01 RX ADMIN — OXYCODONE HYDROCHLORIDE AND ACETAMINOPHEN 1 TABLET: 7.5; 325 TABLET ORAL at 10:05

## 2022-01-01 RX ADMIN — VASOPRESSIN 0.03 UNITS/MIN: 20 INJECTION PARENTERAL at 19:20

## 2022-01-01 RX ADMIN — CEFTAZIDIME 1000 MG: 1 INJECTION, POWDER, FOR SOLUTION INTRAMUSCULAR; INTRAVENOUS at 11:25

## 2022-01-01 RX ADMIN — POTASSIUM CHLORIDE 20 MEQ: 29.8 INJECTION INTRAVENOUS at 08:32

## 2022-01-01 RX ADMIN — APIXABAN 5 MG: 5 TABLET, FILM COATED ORAL at 09:55

## 2022-01-01 RX ADMIN — ANORECTAL OINTMENT: 15.7; .44; 24; 20.6 OINTMENT TOPICAL at 08:43

## 2022-01-01 RX ADMIN — CLINDAMYCIN PHOSPHATE 900 MG: 900 INJECTION, SOLUTION INTRAVENOUS at 03:05

## 2022-01-01 RX ADMIN — INSULIN LISPRO 6 UNITS: 100 INJECTION, SOLUTION INTRAVENOUS; SUBCUTANEOUS at 19:45

## 2022-01-01 RX ADMIN — Medication: at 17:23

## 2022-01-01 RX ADMIN — DOCUSATE SODIUM 100 MG: 100 CAPSULE, LIQUID FILLED ORAL at 20:58

## 2022-01-01 RX ADMIN — SODIUM PHOSPHATE, MONOBASIC, MONOHYDRATE 12 MMOL: 276; 142 INJECTION, SOLUTION INTRAVENOUS at 06:37

## 2022-01-01 RX ADMIN — ASPIRIN 81 MG 81 MG: 81 TABLET ORAL at 20:14

## 2022-01-01 RX ADMIN — SODIUM PHOSPHATE, MONOBASIC, MONOHYDRATE 6 MMOL: 276; 142 INJECTION, SOLUTION INTRAVENOUS at 06:43

## 2022-01-01 RX ADMIN — MAGNESIUM GLUCONATE 500 MG ORAL TABLET 800 MG: 500 TABLET ORAL at 08:32

## 2022-01-01 RX ADMIN — INSULIN GLARGINE 25 UNITS: 100 INJECTION, SOLUTION SUBCUTANEOUS at 21:28

## 2022-01-01 RX ADMIN — CIPROFLOXACIN HYDROCHLORIDE 500 MG: 500 TABLET, FILM COATED ORAL at 08:47

## 2022-01-01 RX ADMIN — PANTOPRAZOLE SODIUM 40 MG: 40 TABLET, DELAYED RELEASE ORAL at 05:40

## 2022-01-01 RX ADMIN — SODIUM PHOSPHATE, MONOBASIC, MONOHYDRATE 6 MMOL: 276; 142 INJECTION, SOLUTION INTRAVENOUS at 20:28

## 2022-01-01 RX ADMIN — Medication: at 13:15

## 2022-01-01 RX ADMIN — Medication: at 01:17

## 2022-01-01 RX ADMIN — SENNOSIDES 17.2 MG: 8.6 TABLET, COATED ORAL at 20:14

## 2022-01-01 RX ADMIN — SODIUM PHOSPHATE, MONOBASIC, MONOHYDRATE 6 MMOL: 276; 142 INJECTION, SOLUTION INTRAVENOUS at 18:07

## 2022-01-01 RX ADMIN — Medication: at 01:00

## 2022-01-01 RX ADMIN — HYDRALAZINE HYDROCHLORIDE 10 MG: 10 TABLET, FILM COATED ORAL at 09:09

## 2022-01-01 RX ADMIN — SODIUM CHLORIDE, PRESERVATIVE FREE 10 ML: 5 INJECTION INTRAVENOUS at 09:55

## 2022-01-01 RX ADMIN — IPRATROPIUM BROMIDE AND ALBUTEROL SULFATE 1 AMPULE: 2.5; .5 SOLUTION RESPIRATORY (INHALATION) at 07:42

## 2022-01-01 RX ADMIN — KETAMINE HYDROCHLORIDE 0.4 MG/KG/HR: 50 INJECTION, SOLUTION INTRAMUSCULAR; INTRAVENOUS at 16:13

## 2022-01-01 RX ADMIN — PANTOPRAZOLE SODIUM 40 MG: 40 TABLET, DELAYED RELEASE ORAL at 05:11

## 2022-01-01 RX ADMIN — IPRATROPIUM BROMIDE AND ALBUTEROL SULFATE 1 AMPULE: .5; 2.5 SOLUTION RESPIRATORY (INHALATION) at 07:09

## 2022-01-01 RX ADMIN — Medication: at 08:22

## 2022-01-01 RX ADMIN — APIXABAN 5 MG: 5 TABLET, FILM COATED ORAL at 08:13

## 2022-01-01 RX ADMIN — HYDROCORTISONE SODIUM SUCCINATE 100 MG: 100 INJECTION, POWDER, FOR SOLUTION INTRAMUSCULAR; INTRAVENOUS at 02:33

## 2022-01-01 RX ADMIN — POTASSIUM CHLORIDE 20 MEQ: 29.8 INJECTION INTRAVENOUS at 10:27

## 2022-01-01 RX ADMIN — CARBOXYMETHYLCELLULOSE SODIUM 1 DROP: 10 GEL OPHTHALMIC at 20:50

## 2022-01-01 RX ADMIN — SODIUM CHLORIDE, POTASSIUM CHLORIDE, SODIUM LACTATE AND CALCIUM CHLORIDE: 600; 310; 30; 20 INJECTION, SOLUTION INTRAVENOUS at 08:12

## 2022-01-01 RX ADMIN — CETIRIZINE HYDROCHLORIDE 10 MG: 10 TABLET ORAL at 08:33

## 2022-01-01 RX ADMIN — MIDODRINE HYDROCHLORIDE 15 MG: 5 TABLET ORAL at 08:08

## 2022-01-01 RX ADMIN — PANTOPRAZOLE SODIUM 40 MG: 40 INJECTION, POWDER, FOR SOLUTION INTRAVENOUS at 08:26

## 2022-01-01 RX ADMIN — IPRATROPIUM BROMIDE AND ALBUTEROL SULFATE 1 AMPULE: .5; 2.5 SOLUTION RESPIRATORY (INHALATION) at 12:23

## 2022-01-01 RX ADMIN — IPRATROPIUM BROMIDE AND ALBUTEROL SULFATE 1 AMPULE: .5; 2.5 SOLUTION RESPIRATORY (INHALATION) at 18:37

## 2022-01-01 RX ADMIN — ALBUMIN (HUMAN) 25 G: 0.25 INJECTION, SOLUTION INTRAVENOUS at 11:24

## 2022-01-01 RX ADMIN — SENNOSIDES 17.2 MG: 8.6 TABLET, COATED ORAL at 21:07

## 2022-01-01 RX ADMIN — EPOETIN ALFA-EPBX 3000 UNITS: 3000 INJECTION, SOLUTION INTRAVENOUS; SUBCUTANEOUS at 12:30

## 2022-01-01 RX ADMIN — INSULIN LISPRO 4 UNITS: 100 INJECTION, SOLUTION INTRAVENOUS; SUBCUTANEOUS at 09:54

## 2022-01-01 RX ADMIN — SODIUM PHOSPHATE, MONOBASIC, MONOHYDRATE 6 MMOL: 276; 142 INJECTION, SOLUTION INTRAVENOUS at 13:21

## 2022-01-01 RX ADMIN — MIDODRINE HYDROCHLORIDE 15 MG: 5 TABLET ORAL at 17:10

## 2022-01-01 RX ADMIN — WHITE PETROLATUM 57.7 %-MINERAL OIL 31.9 % EYE OINTMENT: at 07:27

## 2022-01-01 RX ADMIN — EPOETIN ALFA-EPBX 3000 UNITS: 3000 INJECTION, SOLUTION INTRAVENOUS; SUBCUTANEOUS at 07:58

## 2022-01-01 RX ADMIN — KETAMINE HYDROCHLORIDE 0.6 MG/KG/HR: 50 INJECTION, SOLUTION INTRAMUSCULAR; INTRAVENOUS at 03:56

## 2022-01-01 RX ADMIN — MIDAZOLAM HYDROCHLORIDE 2 MG: 2 INJECTION, SOLUTION INTRAMUSCULAR; INTRAVENOUS at 16:14

## 2022-01-01 RX ADMIN — VASOPRESSIN 0.03 UNITS/MIN: 20 INJECTION PARENTERAL at 08:14

## 2022-01-01 RX ADMIN — APIXABAN 2.5 MG: 5 TABLET, FILM COATED ORAL at 19:57

## 2022-01-01 RX ADMIN — CARBOXYMETHYLCELLULOSE SODIUM 1 DROP: 10 GEL OPHTHALMIC at 17:19

## 2022-01-01 RX ADMIN — INSULIN LISPRO 8 UNITS: 100 INJECTION, SOLUTION INTRAVENOUS; SUBCUTANEOUS at 17:28

## 2022-01-01 RX ADMIN — MIDAZOLAM HYDROCHLORIDE 2 MG: 2 INJECTION, SOLUTION INTRAMUSCULAR; INTRAVENOUS at 01:19

## 2022-01-01 RX ADMIN — HYDRALAZINE HYDROCHLORIDE 10 MG: 10 TABLET, FILM COATED ORAL at 08:54

## 2022-01-01 RX ADMIN — APIXABAN 5 MG: 5 TABLET, FILM COATED ORAL at 20:27

## 2022-01-01 RX ADMIN — Medication: at 00:17

## 2022-01-01 RX ADMIN — CEFTAZIDIME 1000 MG: 1 INJECTION, POWDER, FOR SOLUTION INTRAMUSCULAR; INTRAVENOUS at 22:15

## 2022-01-01 RX ADMIN — ASPIRIN 81 MG 81 MG: 81 TABLET ORAL at 20:00

## 2022-01-01 RX ADMIN — Medication: at 21:09

## 2022-01-01 RX ADMIN — INSULIN GLARGINE 15 UNITS: 100 INJECTION, SOLUTION SUBCUTANEOUS at 00:20

## 2022-01-01 RX ADMIN — DEXTROSE MONOHYDRATE 100 ML/HR: 50 INJECTION, SOLUTION INTRAVENOUS at 08:12

## 2022-01-01 RX ADMIN — MAGNESIUM GLUCONATE 500 MG ORAL TABLET 800 MG: 500 TABLET ORAL at 08:53

## 2022-01-01 RX ADMIN — Medication 2 PUFF: at 07:01

## 2022-01-01 RX ADMIN — METOPROLOL SUCCINATE 25 MG: 25 TABLET, FILM COATED, EXTENDED RELEASE ORAL at 20:13

## 2022-01-01 RX ADMIN — HYDROCORTISONE SODIUM SUCCINATE 100 MG: 100 INJECTION, POWDER, FOR SOLUTION INTRAMUSCULAR; INTRAVENOUS at 11:23

## 2022-01-01 RX ADMIN — CIPROFLOXACIN HYDROCHLORIDE 500 MG: 500 TABLET, FILM COATED ORAL at 20:45

## 2022-01-01 RX ADMIN — STANDARDIZED SENNA CONCENTRATE AND DOCUSATE SODIUM 2 TABLET: 8.6; 5 TABLET ORAL at 20:06

## 2022-01-01 RX ADMIN — INSULIN GLARGINE 15 UNITS: 100 INJECTION, SOLUTION SUBCUTANEOUS at 20:59

## 2022-01-01 RX ADMIN — SODIUM CHLORIDE 500 ML: 9 INJECTION, SOLUTION INTRAVENOUS at 19:04

## 2022-01-01 RX ADMIN — CALCIUM GLUCONATE 1000 MG: 98 INJECTION, SOLUTION INTRAVENOUS at 05:51

## 2022-01-01 RX ADMIN — KETAMINE HYDROCHLORIDE 0.5 MG/KG/HR: 50 INJECTION, SOLUTION INTRAMUSCULAR; INTRAVENOUS at 19:06

## 2022-01-01 RX ADMIN — PANTOPRAZOLE SODIUM 40 MG: 40 TABLET, DELAYED RELEASE ORAL at 05:08

## 2022-01-01 RX ADMIN — SODIUM CHLORIDE, PRESERVATIVE FREE 10 ML: 5 INJECTION INTRAVENOUS at 12:08

## 2022-01-01 RX ADMIN — SODIUM BICARBONATE 50 MEQ: 84 INJECTION, SOLUTION INTRAVENOUS at 05:09

## 2022-01-01 RX ADMIN — IPRATROPIUM BROMIDE AND ALBUTEROL SULFATE 1 AMPULE: 2.5; .5 SOLUTION RESPIRATORY (INHALATION) at 11:50

## 2022-01-01 RX ADMIN — INSULIN LISPRO 3 UNITS: 100 INJECTION, SOLUTION INTRAVENOUS; SUBCUTANEOUS at 03:55

## 2022-01-01 RX ADMIN — OXYCODONE HYDROCHLORIDE AND ACETAMINOPHEN 1 TABLET: 7.5; 325 TABLET ORAL at 17:30

## 2022-01-01 RX ADMIN — IPRATROPIUM BROMIDE AND ALBUTEROL SULFATE 1 AMPULE: .5; 2.5 SOLUTION RESPIRATORY (INHALATION) at 20:56

## 2022-01-01 RX ADMIN — CHLORHEXIDINE GLUCONATE 0.12% ORAL RINSE 15 ML: 1.2 LIQUID ORAL at 08:00

## 2022-01-01 RX ADMIN — CARBOXYMETHYLCELLULOSE SODIUM 1 DROP: 10 GEL OPHTHALMIC at 09:05

## 2022-01-01 RX ADMIN — ASPIRIN 81 MG: 81 TABLET, CHEWABLE ORAL at 21:16

## 2022-01-01 RX ADMIN — MAGNESIUM GLUCONATE 500 MG ORAL TABLET 800 MG: 500 TABLET ORAL at 08:34

## 2022-01-01 RX ADMIN — MONTELUKAST SODIUM 10 MG: 10 TABLET, COATED ORAL at 15:31

## 2022-01-01 RX ADMIN — SODIUM CHLORIDE 30 MG/ML INHALATION SOLUTION 4 ML: 30 SOLUTION INHALANT at 18:50

## 2022-01-01 RX ADMIN — IPRATROPIUM BROMIDE AND ALBUTEROL SULFATE 1 AMPULE: 2.5; .5 SOLUTION RESPIRATORY (INHALATION) at 02:51

## 2022-01-01 RX ADMIN — IPRATROPIUM BROMIDE AND ALBUTEROL SULFATE 1 AMPULE: .5; 2.5 SOLUTION RESPIRATORY (INHALATION) at 23:22

## 2022-01-01 RX ADMIN — ASPIRIN 81 MG: 81 TABLET, CHEWABLE ORAL at 20:13

## 2022-01-01 RX ADMIN — ASPIRIN 81 MG 81 MG: 81 TABLET ORAL at 19:59

## 2022-01-01 RX ADMIN — HYDROCORTISONE SODIUM SUCCINATE 100 MG: 100 INJECTION, POWDER, FOR SOLUTION INTRAMUSCULAR; INTRAVENOUS at 10:59

## 2022-01-01 RX ADMIN — ALBUTEROL SULFATE 2.5 MG: 2.5 SOLUTION RESPIRATORY (INHALATION) at 03:56

## 2022-01-01 RX ADMIN — WHITE PETROLATUM 57.7 %-MINERAL OIL 31.9 % EYE OINTMENT: at 19:14

## 2022-01-01 RX ADMIN — TOBRAMYCIN 300 MG: 300 SOLUTION RESPIRATORY (INHALATION) at 19:08

## 2022-01-01 RX ADMIN — VANCOMYCIN HYDROCHLORIDE 1000 MG: 1 INJECTION, POWDER, LYOPHILIZED, FOR SOLUTION INTRAVENOUS at 06:18

## 2022-01-01 RX ADMIN — CEFTAZIDIME 1000 MG: 1 INJECTION, POWDER, FOR SOLUTION INTRAMUSCULAR; INTRAVENOUS at 06:41

## 2022-01-01 RX ADMIN — CHLORHEXIDINE GLUCONATE 0.12% ORAL RINSE 15 ML: 1.2 LIQUID ORAL at 07:26

## 2022-01-01 RX ADMIN — MIDAZOLAM HYDROCHLORIDE 2 MG: 2 INJECTION, SOLUTION INTRAMUSCULAR; INTRAVENOUS at 16:17

## 2022-01-01 RX ADMIN — INSULIN LISPRO 4 UNITS: 100 INJECTION, SOLUTION INTRAVENOUS; SUBCUTANEOUS at 20:46

## 2022-01-01 RX ADMIN — STANDARDIZED SENNA CONCENTRATE AND DOCUSATE SODIUM 2 TABLET: 8.6; 5 TABLET ORAL at 07:29

## 2022-01-01 RX ADMIN — ACETAMINOPHEN 650 MG: 325 TABLET ORAL at 09:12

## 2022-01-01 RX ADMIN — METOPROLOL SUCCINATE 25 MG: 25 TABLET, FILM COATED, EXTENDED RELEASE ORAL at 08:53

## 2022-01-01 RX ADMIN — Medication 800 MG: at 21:07

## 2022-01-01 RX ADMIN — SODIUM CHLORIDE, PRESERVATIVE FREE 10 ML: 5 INJECTION INTRAVENOUS at 08:49

## 2022-01-01 RX ADMIN — Medication 2 PUFF: at 07:37

## 2022-01-01 RX ADMIN — HYDRALAZINE HYDROCHLORIDE 10 MG: 10 TABLET, FILM COATED ORAL at 20:14

## 2022-01-01 RX ADMIN — ACETAMINOPHEN 650 MG: 325 TABLET ORAL at 20:45

## 2022-01-01 RX ADMIN — CLINDAMYCIN IN 5 PERCENT DEXTROSE 900 MG: 18 INJECTION, SOLUTION INTRAVENOUS at 04:13

## 2022-01-01 RX ADMIN — ARGATROBAN 0.5 MCG/KG/MIN: 50 INJECTION INTRAVENOUS at 15:08

## 2022-01-01 RX ADMIN — SODIUM CHLORIDE, PRESERVATIVE FREE 10 ML: 5 INJECTION INTRAVENOUS at 08:55

## 2022-01-01 RX ADMIN — SODIUM PHOSPHATE, MONOBASIC, MONOHYDRATE 18 MMOL: 276; 142 INJECTION, SOLUTION INTRAVENOUS at 11:30

## 2022-01-01 RX ADMIN — MIDODRINE HYDROCHLORIDE 10 MG: 10 TABLET ORAL at 07:58

## 2022-01-01 RX ADMIN — ACETAMINOPHEN 650 MG: 325 TABLET ORAL at 21:16

## 2022-01-01 RX ADMIN — ASPIRIN 81 MG: 81 TABLET, CHEWABLE ORAL at 21:55

## 2022-01-01 RX ADMIN — FENTANYL CITRATE 50 MCG: 50 INJECTION INTRAMUSCULAR; INTRAVENOUS at 12:44

## 2022-01-01 RX ADMIN — TRAZODONE HYDROCHLORIDE 50 MG: 50 TABLET ORAL at 20:58

## 2022-01-01 RX ADMIN — KETAMINE HYDROCHLORIDE 0.6 MG/KG/HR: 50 INJECTION, SOLUTION INTRAMUSCULAR; INTRAVENOUS at 20:44

## 2022-01-01 RX ADMIN — Medication: at 18:07

## 2022-01-01 RX ADMIN — SODIUM CHLORIDE, PRESERVATIVE FREE 10 ML: 5 INJECTION INTRAVENOUS at 09:29

## 2022-01-01 RX ADMIN — CHLORHEXIDINE GLUCONATE 0.12% ORAL RINSE 15 ML: 1.2 LIQUID ORAL at 08:22

## 2022-01-01 RX ADMIN — SODIUM CHLORIDE, PRESERVATIVE FREE 10 ML: 5 INJECTION INTRAVENOUS at 08:09

## 2022-01-01 RX ADMIN — INSULIN LISPRO 4 UNITS: 100 INJECTION, SOLUTION INTRAVENOUS; SUBCUTANEOUS at 17:01

## 2022-01-01 RX ADMIN — VASOPRESSIN 0.03 UNITS/MIN: 20 INJECTION PARENTERAL at 18:59

## 2022-01-01 RX ADMIN — OXYCODONE HYDROCHLORIDE AND ACETAMINOPHEN 1 TABLET: 5; 325 TABLET ORAL at 12:59

## 2022-01-01 RX ADMIN — CALCIUM GLUCONATE 1000 MG: 98 INJECTION, SOLUTION INTRAVENOUS at 21:48

## 2022-01-01 RX ADMIN — Medication: at 23:35

## 2022-01-01 RX ADMIN — CARBOXYMETHYLCELLULOSE SODIUM 1 DROP: 10 GEL OPHTHALMIC at 08:15

## 2022-01-01 RX ADMIN — EPINEPHRINE 3 MCG/MIN: 1 INJECTION INTRAMUSCULAR; INTRAVENOUS; SUBCUTANEOUS at 07:01

## 2022-01-01 RX ADMIN — SODIUM CHLORIDE, PRESERVATIVE FREE 10 ML: 5 INJECTION INTRAVENOUS at 07:26

## 2022-01-01 RX ADMIN — HYDROCORTISONE SODIUM SUCCINATE 100 MG: 100 INJECTION, POWDER, FOR SOLUTION INTRAMUSCULAR; INTRAVENOUS at 19:14

## 2022-01-01 RX ADMIN — TOBRAMYCIN 300 MG: 300 SOLUTION RESPIRATORY (INHALATION) at 20:03

## 2022-01-01 RX ADMIN — IPRATROPIUM BROMIDE AND ALBUTEROL SULFATE 1 AMPULE: 2.5; .5 SOLUTION RESPIRATORY (INHALATION) at 15:03

## 2022-01-01 RX ADMIN — ACETAMINOPHEN 650 MG: 325 TABLET ORAL at 15:44

## 2022-01-01 RX ADMIN — FENTANYL CITRATE 50 MCG: 50 INJECTION INTRAMUSCULAR; INTRAVENOUS at 19:20

## 2022-01-01 RX ADMIN — Medication: at 23:08

## 2022-01-01 RX ADMIN — WHITE PETROLATUM 57.7 %-MINERAL OIL 31.9 % EYE OINTMENT: at 10:50

## 2022-01-01 RX ADMIN — OXYCODONE HYDROCHLORIDE AND ACETAMINOPHEN 1 TABLET: 7.5; 325 TABLET ORAL at 06:00

## 2022-01-01 RX ADMIN — INSULIN LISPRO 8 UNITS: 100 INJECTION, SOLUTION INTRAVENOUS; SUBCUTANEOUS at 11:32

## 2022-01-01 RX ADMIN — FENTANYL CITRATE 50 MCG: 50 INJECTION INTRAMUSCULAR; INTRAVENOUS at 18:20

## 2022-01-01 RX ADMIN — CALCIUM GLUCONATE 1000 MG: 98 INJECTION, SOLUTION INTRAVENOUS at 00:17

## 2022-01-01 RX ADMIN — IPRATROPIUM BROMIDE AND ALBUTEROL SULFATE 1 AMPULE: .5; 2.5 SOLUTION RESPIRATORY (INHALATION) at 22:59

## 2022-01-01 RX ADMIN — IPRATROPIUM BROMIDE AND ALBUTEROL SULFATE 1 AMPULE: 2.5; .5 SOLUTION RESPIRATORY (INHALATION) at 07:15

## 2022-01-01 RX ADMIN — FENTANYL CITRATE 50 MCG: 50 INJECTION INTRAMUSCULAR; INTRAVENOUS at 03:38

## 2022-01-01 RX ADMIN — Medication 150 MG: at 03:50

## 2022-01-01 RX ADMIN — INSULIN GLARGINE 25 UNITS: 100 INJECTION, SOLUTION SUBCUTANEOUS at 20:47

## 2022-01-01 RX ADMIN — FUROSEMIDE 20 MG/HR: 10 INJECTION, SOLUTION INTRAMUSCULAR; INTRAVENOUS at 17:10

## 2022-01-01 RX ADMIN — MUPIROCIN: 20 OINTMENT TOPICAL at 20:51

## 2022-01-01 RX ADMIN — ONDANSETRON HYDROCHLORIDE 4 MG: 2 INJECTION, SOLUTION INTRAMUSCULAR; INTRAVENOUS at 20:05

## 2022-01-01 RX ADMIN — SENNOSIDES 8.6 MG: 8.6 TABLET, COATED ORAL at 20:58

## 2022-01-01 RX ADMIN — WHITE PETROLATUM 57.7 %-MINERAL OIL 31.9 % EYE OINTMENT: at 23:13

## 2022-01-01 RX ADMIN — SODIUM CHLORIDE 30 MG/ML INHALATION SOLUTION 4 ML: 30 SOLUTION INHALANT at 23:26

## 2022-01-01 RX ADMIN — INSULIN LISPRO 4 UNITS: 100 INJECTION, SOLUTION INTRAVENOUS; SUBCUTANEOUS at 20:18

## 2022-01-01 RX ADMIN — IPRATROPIUM BROMIDE AND ALBUTEROL SULFATE 1 AMPULE: .5; 2.5 SOLUTION RESPIRATORY (INHALATION) at 12:26

## 2022-01-01 RX ADMIN — INSULIN GLARGINE 40 UNITS: 100 INJECTION, SOLUTION SUBCUTANEOUS at 10:18

## 2022-01-01 RX ADMIN — CARBOXYMETHYLCELLULOSE SODIUM 1 DROP: 10 GEL OPHTHALMIC at 13:06

## 2022-01-01 RX ADMIN — IPRATROPIUM BROMIDE AND ALBUTEROL SULFATE 1 AMPULE: 2.5; .5 SOLUTION RESPIRATORY (INHALATION) at 03:10

## 2022-01-01 RX ADMIN — MIDAZOLAM HYDROCHLORIDE 2 MG: 2 INJECTION, SOLUTION INTRAMUSCULAR; INTRAVENOUS at 04:22

## 2022-01-01 RX ADMIN — CALCIUM GLUCONATE 1000 MG: 98 INJECTION, SOLUTION INTRAVENOUS at 22:57

## 2022-01-01 RX ADMIN — SODIUM CHLORIDE, PRESERVATIVE FREE 10 ML: 5 INJECTION INTRAVENOUS at 20:07

## 2022-01-01 RX ADMIN — INSULIN GLARGINE 25 UNITS: 100 INJECTION, SOLUTION SUBCUTANEOUS at 20:27

## 2022-01-01 RX ADMIN — MAGNESIUM GLUCONATE 500 MG ORAL TABLET 800 MG: 500 TABLET ORAL at 09:54

## 2022-01-01 RX ADMIN — HYDROCORTISONE SODIUM SUCCINATE 100 MG: 100 INJECTION, POWDER, FOR SOLUTION INTRAMUSCULAR; INTRAVENOUS at 10:50

## 2022-01-01 RX ADMIN — Medication 1500 MG: at 14:54

## 2022-01-01 RX ADMIN — CHLORHEXIDINE GLUCONATE 0.12% ORAL RINSE 15 ML: 1.2 LIQUID ORAL at 22:10

## 2022-01-01 RX ADMIN — NOREPINEPHRINE BITARTRATE 5 MCG/MIN: 1 INJECTION INTRAVENOUS at 10:59

## 2022-01-01 RX ADMIN — DOBUTAMINE HYDROCHLORIDE 5 MCG/KG/MIN: 200 INJECTION INTRAVENOUS at 08:11

## 2022-01-01 RX ADMIN — FERROUS SULFATE TAB 325 MG (65 MG ELEMENTAL FE) 325 MG: 325 (65 FE) TAB at 10:18

## 2022-01-01 RX ADMIN — DEXTROSE AND SODIUM CHLORIDE: 5; 900 INJECTION, SOLUTION INTRAVENOUS at 13:16

## 2022-01-01 RX ADMIN — INSULIN GLARGINE 33 UNITS: 100 INJECTION, SOLUTION SUBCUTANEOUS at 08:25

## 2022-01-01 RX ADMIN — INSULIN LISPRO 6 UNITS: 100 INJECTION, SOLUTION INTRAVENOUS; SUBCUTANEOUS at 20:30

## 2022-01-01 RX ADMIN — INSULIN LISPRO 2 UNITS: 100 INJECTION, SOLUTION INTRAVENOUS; SUBCUTANEOUS at 08:12

## 2022-01-01 RX ADMIN — OXYCODONE HYDROCHLORIDE AND ACETAMINOPHEN 1 TABLET: 5; 325 TABLET ORAL at 01:53

## 2022-01-01 RX ADMIN — SODIUM CHLORIDE, PRESERVATIVE FREE 10 ML: 5 INJECTION INTRAVENOUS at 07:30

## 2022-01-01 RX ADMIN — POTASSIUM CHLORIDE 20 MEQ: 29.8 INJECTION INTRAVENOUS at 06:33

## 2022-01-01 RX ADMIN — MEROPENEM 1000 MG: 1 INJECTION, POWDER, FOR SOLUTION INTRAVENOUS at 20:38

## 2022-01-01 RX ADMIN — Medication: at 04:34

## 2022-01-01 RX ADMIN — IPRATROPIUM BROMIDE AND ALBUTEROL SULFATE 1 AMPULE: .5; 2.5 SOLUTION RESPIRATORY (INHALATION) at 16:08

## 2022-01-01 RX ADMIN — IPRATROPIUM BROMIDE AND ALBUTEROL SULFATE 1 AMPULE: .5; 2.5 SOLUTION RESPIRATORY (INHALATION) at 07:24

## 2022-01-01 RX ADMIN — SODIUM CHLORIDE, PRESERVATIVE FREE 10 ML: 5 INJECTION INTRAVENOUS at 07:56

## 2022-01-01 RX ADMIN — MIDAZOLAM HYDROCHLORIDE 2 MG: 2 INJECTION, SOLUTION INTRAMUSCULAR; INTRAVENOUS at 08:41

## 2022-01-01 RX ADMIN — TRAZODONE HYDROCHLORIDE 50 MG: 50 TABLET ORAL at 20:57

## 2022-01-01 RX ADMIN — PANTOPRAZOLE SODIUM 40 MG: 40 INJECTION, POWDER, FOR SOLUTION INTRAVENOUS at 08:22

## 2022-01-01 RX ADMIN — HYDROCORTISONE SODIUM SUCCINATE 100 MG: 100 INJECTION, POWDER, FOR SOLUTION INTRAMUSCULAR; INTRAVENOUS at 18:06

## 2022-01-01 RX ADMIN — IPRATROPIUM BROMIDE AND ALBUTEROL SULFATE 1 AMPULE: 2.5; .5 SOLUTION RESPIRATORY (INHALATION) at 11:35

## 2022-01-01 RX ADMIN — Medication 2570 UNITS/HR: at 10:21

## 2022-01-01 RX ADMIN — CALCIUM GLUCONATE 1000 MG: 98 INJECTION, SOLUTION INTRAVENOUS at 17:06

## 2022-01-01 RX ADMIN — IPRATROPIUM BROMIDE AND ALBUTEROL SULFATE 1 AMPULE: .5; 2.5 SOLUTION RESPIRATORY (INHALATION) at 11:22

## 2022-01-01 RX ADMIN — MIDAZOLAM HYDROCHLORIDE 2 MG: 2 INJECTION, SOLUTION INTRAMUSCULAR; INTRAVENOUS at 04:05

## 2022-01-01 RX ADMIN — EPINEPHRINE 1 MG: 0.1 INJECTION, SOLUTION ENDOTRACHEAL; INTRACARDIAC; INTRAVENOUS at 05:07

## 2022-01-01 RX ADMIN — Medication: at 10:09

## 2022-01-01 RX ADMIN — ANORECTAL OINTMENT: 15.7; .44; 24; 20.6 OINTMENT TOPICAL at 16:13

## 2022-01-01 RX ADMIN — SODIUM CHLORIDE, PRESERVATIVE FREE 10 ML: 5 INJECTION INTRAVENOUS at 08:28

## 2022-01-01 RX ADMIN — CARBOXYMETHYLCELLULOSE SODIUM 1 DROP: 10 GEL OPHTHALMIC at 22:10

## 2022-01-01 RX ADMIN — MEROPENEM 1000 MG: 1 INJECTION, POWDER, FOR SOLUTION INTRAVENOUS at 19:25

## 2022-01-01 RX ADMIN — IPRATROPIUM BROMIDE AND ALBUTEROL SULFATE 1 AMPULE: .5; 2.5 SOLUTION RESPIRATORY (INHALATION) at 23:08

## 2022-01-01 RX ADMIN — CARBOXYMETHYLCELLULOSE SODIUM 1 DROP: 10 GEL OPHTHALMIC at 02:47

## 2022-01-01 RX ADMIN — APIXABAN 2.5 MG: 5 TABLET, FILM COATED ORAL at 07:56

## 2022-01-01 RX ADMIN — KETAMINE HYDROCHLORIDE 0.8 MG/KG/HR: 50 INJECTION, SOLUTION INTRAMUSCULAR; INTRAVENOUS at 04:15

## 2022-01-01 RX ADMIN — POTASSIUM CHLORIDE 40 MEQ: 1500 TABLET, EXTENDED RELEASE ORAL at 17:38

## 2022-01-01 RX ADMIN — MIDODRINE HYDROCHLORIDE 10 MG: 10 TABLET ORAL at 12:17

## 2022-01-01 RX ADMIN — WHITE PETROLATUM 57.7 %-MINERAL OIL 31.9 % EYE OINTMENT: at 20:29

## 2022-01-01 RX ADMIN — INSULIN GLARGINE 33 UNITS: 100 INJECTION, SOLUTION SUBCUTANEOUS at 21:59

## 2022-01-01 RX ADMIN — METOPROLOL SUCCINATE 25 MG: 25 TABLET, FILM COATED, EXTENDED RELEASE ORAL at 20:14

## 2022-01-01 RX ADMIN — CARBOXYMETHYLCELLULOSE SODIUM 1 DROP: 10 GEL OPHTHALMIC at 23:30

## 2022-01-01 RX ADMIN — Medication 2 PUFF: at 08:32

## 2022-01-01 RX ADMIN — VASOPRESSIN 0.04 UNITS/MIN: 20 INJECTION PARENTERAL at 11:04

## 2022-01-01 RX ADMIN — SENNOSIDES 17.2 MG: 8.6 TABLET, COATED ORAL at 20:45

## 2022-01-01 RX ADMIN — VANCOMYCIN HYDROCHLORIDE 750 MG: 10 INJECTION, POWDER, LYOPHILIZED, FOR SOLUTION INTRAVENOUS at 06:07

## 2022-01-01 RX ADMIN — APIXABAN 2.5 MG: 5 TABLET, FILM COATED ORAL at 20:55

## 2022-01-01 RX ADMIN — IPRATROPIUM BROMIDE AND ALBUTEROL SULFATE 1 AMPULE: .5; 2.5 SOLUTION RESPIRATORY (INHALATION) at 15:05

## 2022-01-01 RX ADMIN — CALCIUM GLUCONATE 1000 MG: 98 INJECTION, SOLUTION INTRAVENOUS at 04:39

## 2022-01-01 RX ADMIN — KETAMINE HYDROCHLORIDE 0.4 MG/KG/HR: 50 INJECTION, SOLUTION INTRAMUSCULAR; INTRAVENOUS at 05:05

## 2022-01-01 RX ADMIN — HYDROCORTISONE SODIUM SUCCINATE 100 MG: 100 INJECTION, POWDER, FOR SOLUTION INTRAMUSCULAR; INTRAVENOUS at 02:55

## 2022-01-01 RX ADMIN — DOBUTAMINE 5 MCG/KG/MIN: 12.5 INJECTION, SOLUTION, CONCENTRATE INTRAVENOUS at 23:24

## 2022-01-01 RX ADMIN — KETAMINE HYDROCHLORIDE 0.2 MG/KG/HR: 50 INJECTION, SOLUTION INTRAMUSCULAR; INTRAVENOUS at 18:21

## 2022-01-01 RX ADMIN — ANORECTAL OINTMENT: 15.7; .44; 24; 20.6 OINTMENT TOPICAL at 09:00

## 2022-01-01 RX ADMIN — CLINDAMYCIN PHOSPHATE 900 MG: 900 INJECTION, SOLUTION INTRAVENOUS at 10:34

## 2022-01-01 RX ADMIN — Medication 2 PUFF: at 20:17

## 2022-01-01 RX ADMIN — CETIRIZINE HYDROCHLORIDE 10 MG: 10 TABLET ORAL at 08:16

## 2022-01-01 RX ADMIN — STANDARDIZED SENNA CONCENTRATE AND DOCUSATE SODIUM 2 TABLET: 8.6; 5 TABLET ORAL at 08:05

## 2022-01-01 RX ADMIN — IPRATROPIUM BROMIDE AND ALBUTEROL SULFATE 1 AMPULE: 2.5; .5 SOLUTION RESPIRATORY (INHALATION) at 03:01

## 2022-01-01 RX ADMIN — Medication 400 MG: at 22:30

## 2022-01-01 RX ADMIN — SODIUM PHOSPHATE, MONOBASIC, MONOHYDRATE 6 MMOL: 276; 142 INJECTION, SOLUTION INTRAVENOUS at 17:33

## 2022-01-01 RX ADMIN — IOPAMIDOL 75 ML: 755 INJECTION, SOLUTION INTRAVENOUS at 19:28

## 2022-01-01 RX ADMIN — CEFTAZIDIME 1000 MG: 1 INJECTION, POWDER, FOR SOLUTION INTRAMUSCULAR; INTRAVENOUS at 16:28

## 2022-01-01 RX ADMIN — TOBRAMYCIN 300 MG: 300 SOLUTION RESPIRATORY (INHALATION) at 18:37

## 2022-01-01 RX ADMIN — DEXTROSE MONOHYDRATE 125 ML: 100 INJECTION, SOLUTION INTRAVENOUS at 03:33

## 2022-01-01 RX ADMIN — SENNOSIDES 8.6 MG: 8.6 TABLET, COATED ORAL at 20:50

## 2022-01-01 RX ADMIN — VANCOMYCIN HYDROCHLORIDE 1000 MG: 10 INJECTION, POWDER, LYOPHILIZED, FOR SOLUTION INTRAVENOUS at 09:36

## 2022-01-01 RX ADMIN — PANTOPRAZOLE SODIUM 40 MG: 40 INJECTION, POWDER, FOR SOLUTION INTRAVENOUS at 07:58

## 2022-01-01 RX ADMIN — FENTANYL CITRATE 50 MCG: 50 INJECTION INTRAMUSCULAR; INTRAVENOUS at 14:30

## 2022-01-01 RX ADMIN — Medication 800 MG: at 08:56

## 2022-01-01 RX ADMIN — MIDODRINE HYDROCHLORIDE 10 MG: 10 TABLET ORAL at 18:20

## 2022-01-01 RX ADMIN — Medication: at 09:10

## 2022-01-01 RX ADMIN — COLLAGENASE SANTYL: 250 OINTMENT TOPICAL at 11:42

## 2022-01-01 RX ADMIN — IPRATROPIUM BROMIDE AND ALBUTEROL SULFATE 1 AMPULE: 2.5; .5 SOLUTION RESPIRATORY (INHALATION) at 07:48

## 2022-01-01 RX ADMIN — INSULIN GLARGINE 55 UNITS: 100 INJECTION, SOLUTION SUBCUTANEOUS at 08:50

## 2022-01-01 RX ADMIN — INSULIN LISPRO 9 UNITS: 100 INJECTION, SOLUTION INTRAVENOUS; SUBCUTANEOUS at 21:00

## 2022-01-01 RX ADMIN — TRAZODONE HYDROCHLORIDE 50 MG: 50 TABLET ORAL at 21:25

## 2022-01-01 RX ADMIN — SODIUM CHLORIDE 900 ML: 9 INJECTION, SOLUTION INTRAVENOUS at 03:45

## 2022-01-01 RX ADMIN — DEXTROSE MONOHYDRATE: 50 INJECTION, SOLUTION INTRAVENOUS at 18:47

## 2022-01-01 RX ADMIN — APIXABAN 2.5 MG: 5 TABLET, FILM COATED ORAL at 07:29

## 2022-01-01 RX ADMIN — CALCIUM GLUCONATE 1000 MG: 98 INJECTION, SOLUTION INTRAVENOUS at 15:30

## 2022-01-01 RX ADMIN — FUROSEMIDE 10 MG/HR: 10 INJECTION, SOLUTION INTRAVENOUS at 17:49

## 2022-01-01 RX ADMIN — INSULIN GLARGINE 33 UNITS: 100 INJECTION, SOLUTION SUBCUTANEOUS at 08:21

## 2022-01-01 RX ADMIN — Medication 2 PUFF: at 07:47

## 2022-01-01 RX ADMIN — Medication: at 14:16

## 2022-01-01 RX ADMIN — ANORECTAL OINTMENT: 15.7; .44; 24; 20.6 OINTMENT TOPICAL at 08:37

## 2022-01-01 RX ADMIN — APIXABAN 5 MG: 5 TABLET, FILM COATED ORAL at 09:45

## 2022-01-01 RX ADMIN — INSULIN LISPRO 8 UNITS: 100 INJECTION, SOLUTION INTRAVENOUS; SUBCUTANEOUS at 11:13

## 2022-01-01 RX ADMIN — INSULIN LISPRO 3 UNITS: 100 INJECTION, SOLUTION INTRAVENOUS; SUBCUTANEOUS at 22:11

## 2022-01-01 RX ADMIN — INSULIN LISPRO 4 UNITS: 100 INJECTION, SOLUTION INTRAVENOUS; SUBCUTANEOUS at 17:06

## 2022-01-01 RX ADMIN — Medication 150 MG: at 19:20

## 2022-01-01 RX ADMIN — CARBOXYMETHYLCELLULOSE SODIUM 1 DROP: 10 GEL OPHTHALMIC at 05:51

## 2022-01-01 RX ADMIN — NOREPINEPHRINE BITARTRATE 15 MCG/MIN: 1 INJECTION INTRAVENOUS at 21:26

## 2022-01-01 RX ADMIN — SODIUM CHLORIDE 30 MG/ML INHALATION SOLUTION 4 ML: 30 SOLUTION INHALANT at 07:09

## 2022-01-01 RX ADMIN — IPRATROPIUM BROMIDE AND ALBUTEROL SULFATE 1 AMPULE: .5; 2.5 SOLUTION RESPIRATORY (INHALATION) at 08:22

## 2022-01-01 RX ADMIN — HEPARIN SODIUM 4000 UNITS: 1000 INJECTION INTRAVENOUS; SUBCUTANEOUS at 20:55

## 2022-01-01 RX ADMIN — Medication 2 PUFF: at 07:56

## 2022-01-01 RX ADMIN — IPRATROPIUM BROMIDE AND ALBUTEROL SULFATE 1 AMPULE: .5; 2.5 SOLUTION RESPIRATORY (INHALATION) at 15:19

## 2022-01-01 RX ADMIN — SODIUM CHLORIDE 100 ML: 9 INJECTION, SOLUTION INTRAVENOUS at 21:45

## 2022-01-01 RX ADMIN — CALCIUM GLUCONATE 1000 MG: 98 INJECTION, SOLUTION INTRAVENOUS at 17:07

## 2022-01-01 RX ADMIN — ASPIRIN 81 MG 81 MG: 81 TABLET ORAL at 21:14

## 2022-01-01 RX ADMIN — METOPROLOL SUCCINATE 25 MG: 25 TABLET, FILM COATED, EXTENDED RELEASE ORAL at 11:17

## 2022-01-01 RX ADMIN — CLINDAMYCIN PHOSPHATE 900 MG: 900 INJECTION, SOLUTION INTRAVENOUS at 11:16

## 2022-01-01 RX ADMIN — IPRATROPIUM BROMIDE AND ALBUTEROL SULFATE 1 AMPULE: 2.5; .5 SOLUTION RESPIRATORY (INHALATION) at 19:01

## 2022-01-01 RX ADMIN — INSULIN GLARGINE 33 UNITS: 100 INJECTION, SOLUTION SUBCUTANEOUS at 21:48

## 2022-01-01 RX ADMIN — CALCIUM GLUCONATE 1000 MG: 98 INJECTION, SOLUTION INTRAVENOUS at 22:37

## 2022-01-01 RX ADMIN — VASOPRESSIN 0.03 UNITS/MIN: 20 INJECTION PARENTERAL at 01:20

## 2022-01-01 RX ADMIN — MIDAZOLAM HYDROCHLORIDE 2 MG: 2 INJECTION, SOLUTION INTRAMUSCULAR; INTRAVENOUS at 09:38

## 2022-01-01 RX ADMIN — EPINEPHRINE 1 MG: 0.1 INJECTION, SOLUTION ENDOTRACHEAL; INTRACARDIAC; INTRAVENOUS at 05:05

## 2022-01-01 RX ADMIN — MIDODRINE HYDROCHLORIDE 10 MG: 10 TABLET ORAL at 16:59

## 2022-01-01 RX ADMIN — VASOPRESSIN 0.03 UNITS/MIN: 20 INJECTION PARENTERAL at 07:49

## 2022-01-01 RX ADMIN — MUPIROCIN: 20 OINTMENT TOPICAL at 22:11

## 2022-01-01 RX ADMIN — Medication: at 20:21

## 2022-01-01 RX ADMIN — INSULIN LISPRO 3 UNITS: 100 INJECTION, SOLUTION INTRAVENOUS; SUBCUTANEOUS at 03:45

## 2022-01-01 RX ADMIN — SODIUM CHLORIDE, PRESERVATIVE FREE 10 ML: 5 INJECTION INTRAVENOUS at 20:12

## 2022-01-01 RX ADMIN — CIPROFLOXACIN HYDROCHLORIDE 500 MG: 500 TABLET, FILM COATED ORAL at 12:07

## 2022-01-01 RX ADMIN — CEFTAZIDIME 1000 MG: 1 INJECTION, POWDER, FOR SOLUTION INTRAMUSCULAR; INTRAVENOUS at 14:58

## 2022-01-01 RX ADMIN — POTASSIUM BICARBONATE 40 MEQ: 782 TABLET, EFFERVESCENT ORAL at 17:32

## 2022-01-01 RX ADMIN — NOREPINEPHRINE BITARTRATE 40 MCG/MIN: 1 INJECTION INTRAVENOUS at 05:02

## 2022-01-01 RX ADMIN — ANORECTAL OINTMENT: 15.7; .44; 24; 20.6 OINTMENT TOPICAL at 07:17

## 2022-01-01 RX ADMIN — Medication: at 18:06

## 2022-01-01 RX ADMIN — Medication 2190 UNITS/HR: at 00:30

## 2022-01-01 RX ADMIN — CARBOXYMETHYLCELLULOSE SODIUM 1 DROP: 10 GEL OPHTHALMIC at 05:46

## 2022-01-01 RX ADMIN — VASOPRESSIN 0.04 UNITS/MIN: 20 INJECTION PARENTERAL at 23:12

## 2022-01-01 RX ADMIN — INSULIN LISPRO 6 UNITS: 100 INJECTION, SOLUTION INTRAVENOUS; SUBCUTANEOUS at 21:12

## 2022-01-01 RX ADMIN — CHLORHEXIDINE GLUCONATE 0.12% ORAL RINSE 15 ML: 1.2 LIQUID ORAL at 20:01

## 2022-01-01 RX ADMIN — Medication: at 00:07

## 2022-01-01 RX ADMIN — VASOPRESSIN 0.03 UNITS/MIN: 20 INJECTION PARENTERAL at 11:57

## 2022-01-01 RX ADMIN — DIGOXIN 125 MCG: 0.25 INJECTION INTRAMUSCULAR; INTRAVENOUS at 12:30

## 2022-01-01 RX ADMIN — APIXABAN 2.5 MG: 5 TABLET, FILM COATED ORAL at 08:22

## 2022-01-01 RX ADMIN — TRAZODONE HYDROCHLORIDE 50 MG: 50 TABLET ORAL at 22:14

## 2022-01-01 RX ADMIN — METOPROLOL SUCCINATE 25 MG: 25 TABLET, FILM COATED, EXTENDED RELEASE ORAL at 21:17

## 2022-01-01 RX ADMIN — IPRATROPIUM BROMIDE AND ALBUTEROL SULFATE 1 AMPULE: .5; 2.5 SOLUTION RESPIRATORY (INHALATION) at 22:40

## 2022-01-01 RX ADMIN — CALCIUM GLUCONATE 1000 MG: 98 INJECTION, SOLUTION INTRAVENOUS at 17:10

## 2022-01-01 RX ADMIN — FERROUS SULFATE TAB 325 MG (65 MG ELEMENTAL FE) 325 MG: 325 (65 FE) TAB at 08:47

## 2022-01-01 RX ADMIN — Medication: at 13:19

## 2022-01-01 RX ADMIN — SODIUM CHLORIDE, PRESERVATIVE FREE 10 ML: 5 INJECTION INTRAVENOUS at 07:49

## 2022-01-01 RX ADMIN — INSULIN GLARGINE 15 UNITS: 100 INJECTION, SOLUTION SUBCUTANEOUS at 10:02

## 2022-01-01 RX ADMIN — SODIUM BICARBONATE 50 MEQ: 84 INJECTION, SOLUTION INTRAVENOUS at 05:07

## 2022-01-01 RX ADMIN — WHITE PETROLATUM 57.7 %-MINERAL OIL 31.9 % EYE OINTMENT: at 11:04

## 2022-01-01 RX ADMIN — HYDRALAZINE HYDROCHLORIDE 10 MG: 10 TABLET, FILM COATED ORAL at 08:56

## 2022-01-01 RX ADMIN — COLLAGENASE SANTYL: 250 OINTMENT TOPICAL at 08:07

## 2022-01-01 RX ADMIN — ACETAMINOPHEN 650 MG: 325 TABLET ORAL at 20:13

## 2022-01-01 RX ADMIN — VASOPRESSIN 0.04 UNITS/MIN: 20 INJECTION PARENTERAL at 12:48

## 2022-01-01 RX ADMIN — CETIRIZINE HYDROCHLORIDE 10 MG: 10 TABLET ORAL at 09:54

## 2022-01-01 RX ADMIN — INSULIN LISPRO 3 UNITS: 100 INJECTION, SOLUTION INTRAVENOUS; SUBCUTANEOUS at 00:03

## 2022-01-01 RX ADMIN — Medication: at 06:39

## 2022-01-01 RX ADMIN — HYDRALAZINE HYDROCHLORIDE 10 MG: 10 TABLET, FILM COATED ORAL at 08:32

## 2022-01-01 RX ADMIN — ACETAMINOPHEN 650 MG: 325 TABLET ORAL at 15:57

## 2022-01-01 RX ADMIN — Medication 2 PUFF: at 08:33

## 2022-01-01 RX ADMIN — CALCIUM GLUCONATE 1000 MG: 98 INJECTION, SOLUTION INTRAVENOUS at 17:17

## 2022-01-01 RX ADMIN — CARBOXYMETHYLCELLULOSE SODIUM 1 DROP: 10 GEL OPHTHALMIC at 10:18

## 2022-01-01 RX ADMIN — HYDROCORTISONE SODIUM SUCCINATE 100 MG: 100 INJECTION, POWDER, FOR SOLUTION INTRAMUSCULAR; INTRAVENOUS at 05:40

## 2022-01-01 RX ADMIN — APIXABAN 5 MG: 5 TABLET, FILM COATED ORAL at 20:13

## 2022-01-01 RX ADMIN — CLINDAMYCIN IN 5 PERCENT DEXTROSE 900 MG: 18 INJECTION, SOLUTION INTRAVENOUS at 18:10

## 2022-01-01 RX ADMIN — MIDODRINE HYDROCHLORIDE 10 MG: 10 TABLET ORAL at 08:54

## 2022-01-01 RX ADMIN — FENTANYL CITRATE 50 MCG: 50 INJECTION INTRAMUSCULAR; INTRAVENOUS at 02:00

## 2022-01-01 RX ADMIN — POTASSIUM CHLORIDE 20 MEQ: 29.8 INJECTION INTRAVENOUS at 05:58

## 2022-01-01 RX ADMIN — PANTOPRAZOLE SODIUM 40 MG: 40 INJECTION, POWDER, FOR SOLUTION INTRAVENOUS at 09:29

## 2022-01-01 RX ADMIN — DOPAMINE HYDROCHLORIDE IN DEXTROSE 2.5 MCG/KG/MIN: 1.6 INJECTION, SOLUTION INTRAVENOUS at 08:17

## 2022-01-01 RX ADMIN — IPRATROPIUM BROMIDE AND ALBUTEROL SULFATE 1 AMPULE: 2.5; .5 SOLUTION RESPIRATORY (INHALATION) at 19:27

## 2022-01-01 RX ADMIN — Medication 15 G: at 04:35

## 2022-01-01 RX ADMIN — ANORECTAL OINTMENT: 15.7; .44; 24; 20.6 OINTMENT TOPICAL at 20:44

## 2022-01-01 RX ADMIN — SODIUM PHOSPHATE, MONOBASIC, MONOHYDRATE 6 MMOL: 276; 142 INJECTION, SOLUTION INTRAVENOUS at 11:52

## 2022-01-01 RX ADMIN — INSULIN LISPRO 4 UNITS: 100 INJECTION, SOLUTION INTRAVENOUS; SUBCUTANEOUS at 11:37

## 2022-01-01 RX ADMIN — SODIUM CHLORIDE, PRESERVATIVE FREE 10 ML: 5 INJECTION INTRAVENOUS at 08:25

## 2022-01-01 RX ADMIN — ACETAMINOPHEN 650 MG: 325 TABLET ORAL at 16:21

## 2022-01-01 RX ADMIN — CALCIUM GLUCONATE 1000 MG: 98 INJECTION, SOLUTION INTRAVENOUS at 23:15

## 2022-01-01 RX ADMIN — INSULIN GLARGINE 33 UNITS: 100 INJECTION, SOLUTION SUBCUTANEOUS at 20:59

## 2022-01-01 RX ADMIN — MAGNESIUM GLUCONATE 500 MG ORAL TABLET 800 MG: 500 TABLET ORAL at 20:44

## 2022-01-01 RX ADMIN — FERROUS SULFATE TAB 325 MG (65 MG ELEMENTAL FE) 325 MG: 325 (65 FE) TAB at 08:24

## 2022-01-01 RX ADMIN — Medication: at 17:43

## 2022-01-01 RX ADMIN — TRAZODONE HYDROCHLORIDE 50 MG: 50 TABLET ORAL at 21:31

## 2022-01-01 RX ADMIN — FERROUS SULFATE TAB 325 MG (65 MG ELEMENTAL FE) 325 MG: 325 (65 FE) TAB at 08:43

## 2022-01-01 RX ADMIN — CEFTRIAXONE SODIUM 1000 MG: 1 INJECTION, POWDER, FOR SOLUTION INTRAMUSCULAR; INTRAVENOUS at 09:06

## 2022-01-01 RX ADMIN — HYDRALAZINE HYDROCHLORIDE 10 MG: 10 TABLET, FILM COATED ORAL at 21:17

## 2022-01-01 RX ADMIN — OXYCODONE HYDROCHLORIDE AND ACETAMINOPHEN 1 TABLET: 5; 325 TABLET ORAL at 22:13

## 2022-01-01 RX ADMIN — SODIUM CHLORIDE 30 MG/ML INHALATION SOLUTION 4 ML: 30 SOLUTION INHALANT at 18:56

## 2022-01-01 RX ADMIN — OXYCODONE HYDROCHLORIDE AND ACETAMINOPHEN 1 TABLET: 5; 325 TABLET ORAL at 18:30

## 2022-01-01 RX ADMIN — CEFTAZIDIME 1000 MG: 1 INJECTION, POWDER, FOR SOLUTION INTRAMUSCULAR; INTRAVENOUS at 06:48

## 2022-01-01 RX ADMIN — WHITE PETROLATUM 57.7 %-MINERAL OIL 31.9 % EYE OINTMENT: at 21:00

## 2022-01-01 RX ADMIN — APIXABAN 2.5 MG: 5 TABLET, FILM COATED ORAL at 21:11

## 2022-01-01 RX ADMIN — CALCIUM GLUCONATE 1000 MG: 98 INJECTION, SOLUTION INTRAVENOUS at 10:41

## 2022-01-01 RX ADMIN — MONTELUKAST SODIUM 10 MG: 10 TABLET, COATED ORAL at 12:06

## 2022-01-01 RX ADMIN — MIDODRINE HYDROCHLORIDE 10 MG: 10 TABLET ORAL at 17:00

## 2022-01-01 RX ADMIN — CEFTAZIDIME 1000 MG: 1 INJECTION, POWDER, FOR SOLUTION INTRAMUSCULAR; INTRAVENOUS at 21:30

## 2022-01-01 RX ADMIN — IMMUNE GLOBULIN (HUMAN) 50 G: 10 INJECTION INTRAVENOUS; SUBCUTANEOUS at 20:40

## 2022-01-01 RX ADMIN — FERROUS SULFATE TAB 325 MG (65 MG ELEMENTAL FE) 325 MG: 325 (65 FE) TAB at 09:53

## 2022-01-01 RX ADMIN — INSULIN LISPRO 2 UNITS: 100 INJECTION, SOLUTION INTRAVENOUS; SUBCUTANEOUS at 09:10

## 2022-01-01 RX ADMIN — CARBOXYMETHYLCELLULOSE SODIUM 1 DROP: 10 GEL OPHTHALMIC at 08:23

## 2022-01-01 RX ADMIN — ASPIRIN 81 MG 81 MG: 81 TABLET ORAL at 19:58

## 2022-01-01 RX ADMIN — ANORECTAL OINTMENT: 15.7; .44; 24; 20.6 OINTMENT TOPICAL at 08:40

## 2022-01-01 RX ADMIN — COLLAGENASE SANTYL: 250 OINTMENT TOPICAL at 08:27

## 2022-01-01 RX ADMIN — APIXABAN 2.5 MG: 5 TABLET, FILM COATED ORAL at 20:22

## 2022-01-01 RX ADMIN — HYDROCORTISONE SODIUM SUCCINATE 100 MG: 100 INJECTION, POWDER, FOR SOLUTION INTRAMUSCULAR; INTRAVENOUS at 20:00

## 2022-01-01 RX ADMIN — MONTELUKAST SODIUM 10 MG: 10 TABLET, COATED ORAL at 08:56

## 2022-01-01 RX ADMIN — Medication 2950 UNITS/HR: at 07:16

## 2022-01-01 RX ADMIN — MIDAZOLAM HYDROCHLORIDE 2 MG: 2 INJECTION, SOLUTION INTRAMUSCULAR; INTRAVENOUS at 08:08

## 2022-01-01 RX ADMIN — PANTOPRAZOLE SODIUM 40 MG: 40 TABLET, DELAYED RELEASE ORAL at 06:31

## 2022-01-01 RX ADMIN — Medication: at 04:05

## 2022-01-01 RX ADMIN — CLINDAMYCIN IN 5 PERCENT DEXTROSE 900 MG: 18 INJECTION, SOLUTION INTRAVENOUS at 17:16

## 2022-01-01 RX ADMIN — IPRATROPIUM BROMIDE AND ALBUTEROL SULFATE 1 AMPULE: 2.5; .5 SOLUTION RESPIRATORY (INHALATION) at 03:13

## 2022-01-01 RX ADMIN — ASPIRIN 81 MG: 81 TABLET, CHEWABLE ORAL at 20:27

## 2022-01-01 RX ADMIN — HEPARIN SODIUM 2000 UNITS: 1000 INJECTION INTRAVENOUS; SUBCUTANEOUS at 19:20

## 2022-01-01 RX ADMIN — ASPIRIN 81 MG 81 MG: 81 TABLET ORAL at 20:55

## 2022-01-01 RX ADMIN — VANCOMYCIN HYDROCHLORIDE 500 MG: 500 INJECTION, POWDER, LYOPHILIZED, FOR SOLUTION INTRAVENOUS at 07:10

## 2022-01-01 RX ADMIN — SODIUM CHLORIDE, PRESERVATIVE FREE 10 ML: 5 INJECTION INTRAVENOUS at 08:05

## 2022-01-01 RX ADMIN — NOREPINEPHRINE BITARTRATE 5 MCG/MIN: 1 INJECTION INTRAVENOUS at 02:24

## 2022-01-01 RX ADMIN — HEPARIN SODIUM 2000 UNITS: 1000 INJECTION INTRAVENOUS; SUBCUTANEOUS at 11:30

## 2022-01-01 RX ADMIN — INSULIN LISPRO 6 UNITS: 100 INJECTION, SOLUTION INTRAVENOUS; SUBCUTANEOUS at 00:10

## 2022-01-01 RX ADMIN — STANDARDIZED SENNA CONCENTRATE AND DOCUSATE SODIUM 2 TABLET: 8.6; 5 TABLET ORAL at 12:30

## 2022-01-01 RX ADMIN — SODIUM PHOSPHATE, MONOBASIC, MONOHYDRATE 12 MMOL: 276; 142 INJECTION, SOLUTION INTRAVENOUS at 06:42

## 2022-01-01 RX ADMIN — Medication 150 MG: at 03:03

## 2022-01-01 RX ADMIN — IPRATROPIUM BROMIDE AND ALBUTEROL SULFATE 1 AMPULE: 2.5; .5 SOLUTION RESPIRATORY (INHALATION) at 07:25

## 2022-01-01 RX ADMIN — CEFTAZIDIME 1000 MG: 1 INJECTION, POWDER, FOR SOLUTION INTRAMUSCULAR; INTRAVENOUS at 06:16

## 2022-01-01 RX ADMIN — CALCIUM GLUCONATE 1000 MG: 98 INJECTION, SOLUTION INTRAVENOUS at 23:28

## 2022-01-01 RX ADMIN — VASOPRESSIN 0.03 UNITS/MIN: 20 INJECTION PARENTERAL at 12:09

## 2022-01-01 RX ADMIN — DOCUSATE SODIUM 100 MG: 100 CAPSULE, LIQUID FILLED ORAL at 21:55

## 2022-01-01 RX ADMIN — VANCOMYCIN HYDROCHLORIDE 1000 MG: 1 INJECTION, POWDER, LYOPHILIZED, FOR SOLUTION INTRAVENOUS at 09:00

## 2022-01-01 RX ADMIN — CHLORHEXIDINE GLUCONATE 0.12% ORAL RINSE 15 ML: 1.2 LIQUID ORAL at 09:11

## 2022-01-01 RX ADMIN — KETAMINE HYDROCHLORIDE 0.7 MG/KG/HR: 50 INJECTION, SOLUTION INTRAMUSCULAR; INTRAVENOUS at 21:37

## 2022-01-01 RX ADMIN — Medication: at 15:23

## 2022-01-01 RX ADMIN — METOPROLOL SUCCINATE 25 MG: 25 TABLET, FILM COATED, EXTENDED RELEASE ORAL at 14:22

## 2022-01-01 RX ADMIN — FENTANYL CITRATE 50 MCG: 50 INJECTION INTRAMUSCULAR; INTRAVENOUS at 17:32

## 2022-01-01 RX ADMIN — OXYCODONE HYDROCHLORIDE AND ACETAMINOPHEN 1 TABLET: 5; 325 TABLET ORAL at 14:00

## 2022-01-01 RX ADMIN — CEFTAZIDIME 1000 MG: 1 INJECTION, POWDER, FOR SOLUTION INTRAMUSCULAR; INTRAVENOUS at 22:00

## 2022-01-01 RX ADMIN — INSULIN GLARGINE 45 UNITS: 100 INJECTION, SOLUTION SUBCUTANEOUS at 07:40

## 2022-01-01 RX ADMIN — VASOPRESSIN 0.04 UNITS/MIN: 20 INJECTION PARENTERAL at 11:43

## 2022-01-01 RX ADMIN — STANDARDIZED SENNA CONCENTRATE AND DOCUSATE SODIUM 2 TABLET: 8.6; 5 TABLET ORAL at 20:16

## 2022-01-01 RX ADMIN — Medication: at 17:18

## 2022-01-01 RX ADMIN — HYDROCORTISONE SODIUM SUCCINATE 100 MG: 100 INJECTION, POWDER, FOR SOLUTION INTRAMUSCULAR; INTRAVENOUS at 03:00

## 2022-01-01 RX ADMIN — KETAMINE HYDROCHLORIDE 0.8 MG/KG/HR: 50 INJECTION, SOLUTION INTRAMUSCULAR; INTRAVENOUS at 06:00

## 2022-01-01 RX ADMIN — TRAZODONE HYDROCHLORIDE 50 MG: 50 TABLET ORAL at 22:10

## 2022-01-01 RX ADMIN — Medication: at 11:54

## 2022-01-01 RX ADMIN — MIDAZOLAM HYDROCHLORIDE 2 MG: 2 INJECTION, SOLUTION INTRAMUSCULAR; INTRAVENOUS at 14:03

## 2022-01-01 RX ADMIN — SODIUM CHLORIDE, PRESERVATIVE FREE 10 ML: 5 INJECTION INTRAVENOUS at 22:11

## 2022-01-01 RX ADMIN — SENNOSIDES 17.2 MG: 8.6 TABLET, COATED ORAL at 22:10

## 2022-01-01 RX ADMIN — SODIUM PHOSPHATE, MONOBASIC, MONOHYDRATE 6 MMOL: 276; 142 INJECTION, SOLUTION INTRAVENOUS at 13:02

## 2022-01-01 RX ADMIN — STANDARDIZED SENNA CONCENTRATE AND DOCUSATE SODIUM 2 TABLET: 8.6; 5 TABLET ORAL at 20:53

## 2022-01-01 RX ADMIN — POTASSIUM CHLORIDE 20 MEQ: 29.8 INJECTION INTRAVENOUS at 10:58

## 2022-01-01 RX ADMIN — DOCUSATE SODIUM 100 MG: 100 CAPSULE, LIQUID FILLED ORAL at 20:45

## 2022-01-01 RX ADMIN — MUPIROCIN: 20 OINTMENT TOPICAL at 09:11

## 2022-01-01 RX ADMIN — SODIUM CHLORIDE 1000 ML: 9 INJECTION, SOLUTION INTRAVENOUS at 15:54

## 2022-01-01 RX ADMIN — MONTELUKAST SODIUM 10 MG: 10 TABLET, COATED ORAL at 09:09

## 2022-01-01 RX ADMIN — IPRATROPIUM BROMIDE AND ALBUTEROL SULFATE 1 AMPULE: 2.5; .5 SOLUTION RESPIRATORY (INHALATION) at 12:01

## 2022-01-01 RX ADMIN — VASOPRESSIN 0.04 UNITS/MIN: 20 INJECTION PARENTERAL at 14:04

## 2022-01-01 RX ADMIN — SODIUM PHOSPHATE, MONOBASIC, MONOHYDRATE 6 MMOL: 276; 142 INJECTION, SOLUTION INTRAVENOUS at 17:17

## 2022-01-01 RX ADMIN — TRAZODONE HYDROCHLORIDE 50 MG: 50 TABLET ORAL at 21:17

## 2022-01-01 RX ADMIN — SODIUM PHOSPHATE, MONOBASIC, MONOHYDRATE 6 MMOL: 276; 142 INJECTION, SOLUTION INTRAVENOUS at 17:30

## 2022-01-01 RX ADMIN — IPRATROPIUM BROMIDE AND ALBUTEROL SULFATE 1 AMPULE: 2.5; .5 SOLUTION RESPIRATORY (INHALATION) at 07:31

## 2022-01-01 RX ADMIN — INSULIN LISPRO 4 UNITS: 100 INJECTION, SOLUTION INTRAVENOUS; SUBCUTANEOUS at 16:34

## 2022-01-01 RX ADMIN — ANORECTAL OINTMENT: 15.7; .44; 24; 20.6 OINTMENT TOPICAL at 09:23

## 2022-01-01 RX ADMIN — FUROSEMIDE 100 MG: 10 INJECTION, SOLUTION INTRAVENOUS at 17:07

## 2022-01-01 RX ADMIN — IPRATROPIUM BROMIDE AND ALBUTEROL SULFATE 1 AMPULE: .5; 2.5 SOLUTION RESPIRATORY (INHALATION) at 19:09

## 2022-01-01 RX ADMIN — Medication 2 PUFF: at 20:50

## 2022-01-01 RX ADMIN — CARBOXYMETHYLCELLULOSE SODIUM 1 DROP: 10 GEL OPHTHALMIC at 04:53

## 2022-01-01 RX ADMIN — APIXABAN 2.5 MG: 5 TABLET, FILM COATED ORAL at 20:58

## 2022-01-01 RX ADMIN — EPINEPHRINE 3 MCG/MIN: 1 INJECTION, SOLUTION, CONCENTRATE INTRAVENOUS at 12:25

## 2022-01-01 RX ADMIN — IPRATROPIUM BROMIDE AND ALBUTEROL SULFATE 1 AMPULE: 2.5; .5 SOLUTION RESPIRATORY (INHALATION) at 07:33

## 2022-01-01 RX ADMIN — IPRATROPIUM BROMIDE AND ALBUTEROL SULFATE 1 AMPULE: .5; 2.5 SOLUTION RESPIRATORY (INHALATION) at 15:07

## 2022-01-01 RX ADMIN — PERFLUTREN 2.2 MG: 6.52 INJECTION, SUSPENSION INTRAVENOUS at 12:53

## 2022-01-01 RX ADMIN — FERROUS SULFATE TAB 325 MG (65 MG ELEMENTAL FE) 325 MG: 325 (65 FE) TAB at 08:53

## 2022-01-01 RX ADMIN — Medication: at 06:07

## 2022-01-01 RX ADMIN — WHITE PETROLATUM 57.7 %-MINERAL OIL 31.9 % EYE OINTMENT: at 04:33

## 2022-01-01 RX ADMIN — STANDARDIZED SENNA CONCENTRATE AND DOCUSATE SODIUM 2 TABLET: 8.6; 5 TABLET ORAL at 21:11

## 2022-01-01 RX ADMIN — Medication 150 MG: at 18:55

## 2022-01-01 RX ADMIN — MIDODRINE HYDROCHLORIDE 10 MG: 10 TABLET ORAL at 16:46

## 2022-01-01 RX ADMIN — SODIUM CHLORIDE, PRESERVATIVE FREE 10 ML: 5 INJECTION INTRAVENOUS at 07:47

## 2022-01-01 RX ADMIN — IPRATROPIUM BROMIDE AND ALBUTEROL SULFATE 1 AMPULE: .5; 2.5 SOLUTION RESPIRATORY (INHALATION) at 19:04

## 2022-01-01 RX ADMIN — Medication: at 15:24

## 2022-01-01 RX ADMIN — Medication: at 19:01

## 2022-01-01 RX ADMIN — INSULIN LISPRO 9 UNITS: 100 INJECTION, SOLUTION INTRAVENOUS; SUBCUTANEOUS at 16:13

## 2022-01-01 RX ADMIN — Medication 2 PUFF: at 19:54

## 2022-01-01 RX ADMIN — Medication: at 16:00

## 2022-01-01 RX ADMIN — CARBOXYMETHYLCELLULOSE SODIUM 1 DROP: 10 GEL OPHTHALMIC at 20:19

## 2022-01-01 RX ADMIN — CEFTRIAXONE SODIUM 1000 MG: 1 INJECTION, POWDER, FOR SOLUTION INTRAMUSCULAR; INTRAVENOUS at 09:15

## 2022-01-01 RX ADMIN — IPRATROPIUM BROMIDE AND ALBUTEROL SULFATE 1 AMPULE: .5; 2.5 SOLUTION RESPIRATORY (INHALATION) at 15:25

## 2022-01-01 RX ADMIN — Medication: at 20:59

## 2022-01-01 RX ADMIN — Medication: at 14:45

## 2022-01-01 RX ADMIN — SODIUM CHLORIDE 250 ML: 9 INJECTION, SOLUTION INTRAVENOUS at 23:10

## 2022-01-01 RX ADMIN — CALCIUM GLUCONATE 1000 MG: 98 INJECTION, SOLUTION INTRAVENOUS at 05:03

## 2022-01-01 RX ADMIN — ACETAMINOPHEN 650 MG: 325 TABLET ORAL at 21:25

## 2022-01-01 RX ADMIN — MONTELUKAST SODIUM 10 MG: 10 TABLET, COATED ORAL at 09:54

## 2022-01-01 RX ADMIN — SODIUM PHOSPHATE, MONOBASIC, MONOHYDRATE 6 MMOL: 276; 142 INJECTION, SOLUTION INTRAVENOUS at 16:26

## 2022-01-01 RX ADMIN — SODIUM CHLORIDE 25 ML: 9 INJECTION, SOLUTION INTRAVENOUS at 08:38

## 2022-01-01 RX ADMIN — INSULIN GLARGINE 25 UNITS: 100 INJECTION, SOLUTION SUBCUTANEOUS at 09:57

## 2022-01-01 RX ADMIN — ANORECTAL OINTMENT: 15.7; .44; 24; 20.6 OINTMENT TOPICAL at 07:27

## 2022-01-01 RX ADMIN — SODIUM PHOSPHATE, MONOBASIC, MONOHYDRATE 6 MMOL: 276; 142 INJECTION, SOLUTION INTRAVENOUS at 11:27

## 2022-01-01 RX ADMIN — CARBOXYMETHYLCELLULOSE SODIUM 1 DROP: 10 GEL OPHTHALMIC at 20:58

## 2022-01-01 RX ADMIN — IPRATROPIUM BROMIDE AND ALBUTEROL SULFATE 1 AMPULE: 2.5; .5 SOLUTION RESPIRATORY (INHALATION) at 18:36

## 2022-01-01 RX ADMIN — NOREPINEPHRINE BITARTRATE 4 MCG/MIN: 1 INJECTION INTRAVENOUS at 19:48

## 2022-01-01 RX ADMIN — APIXABAN 2.5 MG: 5 TABLET, FILM COATED ORAL at 20:00

## 2022-01-01 RX ADMIN — DOCUSATE SODIUM 100 MG: 100 CAPSULE, LIQUID FILLED ORAL at 09:09

## 2022-01-01 RX ADMIN — INSULIN GLARGINE 15 UNITS: 100 INJECTION, SOLUTION SUBCUTANEOUS at 20:33

## 2022-01-01 RX ADMIN — DOCUSATE SODIUM 100 MG: 100 CAPSULE, LIQUID FILLED ORAL at 08:32

## 2022-01-01 RX ADMIN — IPRATROPIUM BROMIDE AND ALBUTEROL SULFATE 1 AMPULE: .5; 2.5 SOLUTION RESPIRATORY (INHALATION) at 02:43

## 2022-01-01 RX ADMIN — Medication: at 15:55

## 2022-01-01 RX ADMIN — SODIUM PHOSPHATE, MONOBASIC, MONOHYDRATE 6 MMOL: 276; 142 INJECTION, SOLUTION INTRAVENOUS at 05:42

## 2022-01-01 RX ADMIN — CETIRIZINE HYDROCHLORIDE 10 MG: 10 TABLET ORAL at 15:31

## 2022-01-01 RX ADMIN — OXYCODONE HYDROCHLORIDE AND ACETAMINOPHEN 1 TABLET: 5; 325 TABLET ORAL at 09:28

## 2022-01-01 RX ADMIN — SODIUM PHOSPHATE, MONOBASIC, MONOHYDRATE 6 MMOL: 276; 142 INJECTION, SOLUTION INTRAVENOUS at 10:17

## 2022-01-01 RX ADMIN — SODIUM CHLORIDE, PRESERVATIVE FREE 10 ML: 5 INJECTION INTRAVENOUS at 20:59

## 2022-01-01 RX ADMIN — INSULIN GLARGINE 25 UNITS: 100 INJECTION, SOLUTION SUBCUTANEOUS at 09:54

## 2022-01-01 RX ADMIN — IPRATROPIUM BROMIDE AND ALBUTEROL SULFATE 1 AMPULE: 2.5; .5 SOLUTION RESPIRATORY (INHALATION) at 23:18

## 2022-01-01 RX ADMIN — MIDODRINE HYDROCHLORIDE 15 MG: 5 TABLET ORAL at 07:33

## 2022-01-01 RX ADMIN — CALCIUM GLUCONATE 1000 MG: 98 INJECTION, SOLUTION INTRAVENOUS at 16:04

## 2022-01-01 RX ADMIN — INSULIN LISPRO 3 UNITS: 100 INJECTION, SOLUTION INTRAVENOUS; SUBCUTANEOUS at 16:43

## 2022-01-01 RX ADMIN — INSULIN LISPRO 12 UNITS: 100 INJECTION, SOLUTION INTRAVENOUS; SUBCUTANEOUS at 04:52

## 2022-01-01 RX ADMIN — SODIUM PHOSPHATE, MONOBASIC, MONOHYDRATE 6 MMOL: 276; 142 INJECTION, SOLUTION INTRAVENOUS at 18:28

## 2022-01-01 RX ADMIN — DEXTROSE MONOHYDRATE 125 ML: 100 INJECTION, SOLUTION INTRAVENOUS at 05:40

## 2022-01-01 RX ADMIN — NOREPINEPHRINE BITARTRATE 13 MCG/MIN: 1 INJECTION INTRAVENOUS at 00:09

## 2022-01-01 RX ADMIN — Medication: at 11:32

## 2022-01-01 RX ADMIN — SODIUM CHLORIDE, PRESERVATIVE FREE 10 ML: 5 INJECTION INTRAVENOUS at 08:14

## 2022-01-01 RX ADMIN — CLINDAMYCIN IN 5 PERCENT DEXTROSE 900 MG: 18 INJECTION, SOLUTION INTRAVENOUS at 20:22

## 2022-01-01 RX ADMIN — HEPARIN SODIUM 2000 UNITS: 1000 INJECTION INTRAVENOUS; SUBCUTANEOUS at 10:21

## 2022-01-01 RX ADMIN — CARBOXYMETHYLCELLULOSE SODIUM 1 DROP: 10 GEL OPHTHALMIC at 22:05

## 2022-01-01 RX ADMIN — ASPIRIN 81 MG: 81 TABLET, CHEWABLE ORAL at 21:15

## 2022-01-01 RX ADMIN — MONTELUKAST SODIUM 10 MG: 10 TABLET, COATED ORAL at 08:24

## 2022-01-01 RX ADMIN — SODIUM CHLORIDE, PRESERVATIVE FREE 10 ML: 5 INJECTION INTRAVENOUS at 20:24

## 2022-01-01 RX ADMIN — APIXABAN 5 MG: 5 TABLET, FILM COATED ORAL at 08:39

## 2022-01-01 RX ADMIN — MIDODRINE HYDROCHLORIDE 10 MG: 10 TABLET ORAL at 07:56

## 2022-01-01 RX ADMIN — SENNOSIDES 8.6 MG: 8.6 TABLET, COATED ORAL at 21:09

## 2022-01-01 RX ADMIN — INSULIN LISPRO 3 UNITS: 100 INJECTION, SOLUTION INTRAVENOUS; SUBCUTANEOUS at 08:11

## 2022-01-01 RX ADMIN — PANTOPRAZOLE SODIUM 40 MG: 40 INJECTION, POWDER, FOR SOLUTION INTRAVENOUS at 07:29

## 2022-01-01 RX ADMIN — Medication 800 MG: at 20:57

## 2022-01-01 RX ADMIN — INSULIN LISPRO 3 UNITS: 100 INJECTION, SOLUTION INTRAVENOUS; SUBCUTANEOUS at 08:22

## 2022-01-01 RX ADMIN — OXYCODONE HYDROCHLORIDE AND ACETAMINOPHEN 1 TABLET: 7.5; 325 TABLET ORAL at 12:17

## 2022-01-01 RX ADMIN — Medication: at 18:48

## 2022-01-01 RX ADMIN — APIXABAN 5 MG: 5 TABLET, FILM COATED ORAL at 08:47

## 2022-01-01 RX ADMIN — FUROSEMIDE 20 MG/HR: 10 INJECTION, SOLUTION INTRAVENOUS at 03:17

## 2022-01-01 RX ADMIN — SENNOSIDES 8.6 MG: 8.6 TABLET, COATED ORAL at 22:10

## 2022-01-01 RX ADMIN — Medication: at 12:56

## 2022-01-01 RX ADMIN — Medication 150 MG: at 19:51

## 2022-01-01 RX ADMIN — CEFTAZIDIME 1000 MG: 1 INJECTION, POWDER, FOR SOLUTION INTRAMUSCULAR; INTRAVENOUS at 23:00

## 2022-01-01 RX ADMIN — IPRATROPIUM BROMIDE AND ALBUTEROL SULFATE 1 AMPULE: .5; 2.5 SOLUTION RESPIRATORY (INHALATION) at 22:58

## 2022-01-01 RX ADMIN — CEFTAZIDIME 1000 MG: 1 INJECTION, POWDER, FOR SOLUTION INTRAMUSCULAR; INTRAVENOUS at 07:04

## 2022-01-01 RX ADMIN — INSULIN LISPRO 2 UNITS: 100 INJECTION, SOLUTION INTRAVENOUS; SUBCUTANEOUS at 19:35

## 2022-01-01 RX ADMIN — Medication 2 PUFF: at 08:14

## 2022-01-01 RX ADMIN — IPRATROPIUM BROMIDE AND ALBUTEROL SULFATE 1 AMPULE: 2.5; .5 SOLUTION RESPIRATORY (INHALATION) at 08:29

## 2022-01-01 RX ADMIN — FENTANYL CITRATE 50 MCG: 50 INJECTION INTRAMUSCULAR; INTRAVENOUS at 03:45

## 2022-01-01 RX ADMIN — TIGECYCLINE 50 MG: 50 INJECTION, POWDER, LYOPHILIZED, FOR SOLUTION INTRAVENOUS at 10:55

## 2022-01-01 RX ADMIN — SODIUM PHOSPHATE, MONOBASIC, MONOHYDRATE 6 MMOL: 276; 142 INJECTION, SOLUTION INTRAVENOUS at 05:45

## 2022-01-01 RX ADMIN — Medication 2 PUFF: at 19:49

## 2022-01-01 RX ADMIN — DOCUSATE SODIUM 100 MG: 100 CAPSULE, LIQUID FILLED ORAL at 20:57

## 2022-01-01 RX ADMIN — MAGNESIUM GLUCONATE 500 MG ORAL TABLET 800 MG: 500 TABLET ORAL at 09:45

## 2022-01-01 RX ADMIN — COLLAGENASE SANTYL: 250 OINTMENT TOPICAL at 12:31

## 2022-01-01 RX ADMIN — SODIUM PHOSPHATE, MONOBASIC, MONOHYDRATE 6 MMOL: 276; 142 INJECTION, SOLUTION INTRAVENOUS at 00:19

## 2022-01-01 RX ADMIN — HYDROCORTISONE SODIUM SUCCINATE 100 MG: 100 INJECTION, POWDER, FOR SOLUTION INTRAMUSCULAR; INTRAVENOUS at 11:00

## 2022-01-01 RX ADMIN — CEFTAZIDIME 1000 MG: 1 INJECTION, POWDER, FOR SOLUTION INTRAMUSCULAR; INTRAVENOUS at 13:15

## 2022-01-01 RX ADMIN — DEXTROSE MONOHYDRATE 250 ML: 10 INJECTION, SOLUTION INTRAVENOUS at 15:52

## 2022-01-01 RX ADMIN — TRAZODONE HYDROCHLORIDE 50 MG: 50 TABLET ORAL at 23:29

## 2022-01-01 RX ADMIN — TOBRAMYCIN 300 MG: 300 SOLUTION RESPIRATORY (INHALATION) at 08:04

## 2022-01-01 RX ADMIN — SPIRONOLACTONE 50 MG: 25 TABLET ORAL at 09:45

## 2022-01-01 RX ADMIN — CEFTRIAXONE SODIUM 1000 MG: 1 INJECTION, POWDER, FOR SOLUTION INTRAMUSCULAR; INTRAVENOUS at 08:53

## 2022-01-01 RX ADMIN — INSULIN LISPRO 6 UNITS: 100 INJECTION, SOLUTION INTRAVENOUS; SUBCUTANEOUS at 08:27

## 2022-01-01 RX ADMIN — TIGECYCLINE 50 MG: 50 INJECTION, POWDER, LYOPHILIZED, FOR SOLUTION INTRAVENOUS at 12:14

## 2022-01-01 RX ADMIN — MAGNESIUM GLUCONATE 500 MG ORAL TABLET 800 MG: 500 TABLET ORAL at 21:10

## 2022-01-01 RX ADMIN — POTASSIUM CHLORIDE 20 MEQ: 29.8 INJECTION INTRAVENOUS at 11:21

## 2022-01-01 RX ADMIN — IPRATROPIUM BROMIDE AND ALBUTEROL SULFATE 1 AMPULE: 2.5; .5 SOLUTION RESPIRATORY (INHALATION) at 02:55

## 2022-01-01 RX ADMIN — IPRATROPIUM BROMIDE AND ALBUTEROL SULFATE 1 AMPULE: 2.5; .5 SOLUTION RESPIRATORY (INHALATION) at 23:09

## 2022-01-01 RX ADMIN — ASPIRIN 81 MG 81 MG: 81 TABLET ORAL at 20:20

## 2022-01-01 RX ADMIN — ONDANSETRON HYDROCHLORIDE 4 MG: 2 INJECTION, SOLUTION INTRAMUSCULAR; INTRAVENOUS at 09:25

## 2022-01-01 RX ADMIN — MIDODRINE HYDROCHLORIDE 15 MG: 5 TABLET ORAL at 08:13

## 2022-01-01 RX ADMIN — ASPIRIN 81 MG 81 MG: 81 TABLET ORAL at 21:29

## 2022-01-01 RX ADMIN — CALCIUM GLUCONATE 1000 MG: 98 INJECTION, SOLUTION INTRAVENOUS at 07:08

## 2022-01-01 RX ADMIN — NOREPINEPHRINE BITARTRATE 25 MCG/MIN: 1 INJECTION INTRAVENOUS at 07:00

## 2022-01-01 RX ADMIN — DEXTROSE AND SODIUM CHLORIDE: 5; 450 INJECTION, SOLUTION INTRAVENOUS at 16:16

## 2022-01-01 RX ADMIN — Medication: at 12:16

## 2022-01-01 RX ADMIN — TRAZODONE HYDROCHLORIDE 50 MG: 50 TABLET ORAL at 21:07

## 2022-01-01 RX ADMIN — IPRATROPIUM BROMIDE AND ALBUTEROL SULFATE 1 AMPULE: 2.5; .5 SOLUTION RESPIRATORY (INHALATION) at 15:24

## 2022-01-01 RX ADMIN — PANTOPRAZOLE SODIUM 40 MG: 40 INJECTION, POWDER, FOR SOLUTION INTRAVENOUS at 07:47

## 2022-01-01 RX ADMIN — MONTELUKAST SODIUM 10 MG: 10 TABLET, COATED ORAL at 08:34

## 2022-01-01 RX ADMIN — Medication: at 05:57

## 2022-01-01 RX ADMIN — INSULIN LISPRO 6 UNITS: 100 INJECTION, SOLUTION INTRAVENOUS; SUBCUTANEOUS at 12:17

## 2022-01-01 RX ADMIN — Medication: at 09:16

## 2022-01-01 RX ADMIN — SODIUM CHLORIDE, PRESERVATIVE FREE 10 ML: 5 INJECTION INTRAVENOUS at 07:33

## 2022-01-01 RX ADMIN — WHITE PETROLATUM 57.7 %-MINERAL OIL 31.9 % EYE OINTMENT: at 14:45

## 2022-01-01 RX ADMIN — Medication: at 04:14

## 2022-01-01 RX ADMIN — MIDODRINE HYDROCHLORIDE 10 MG: 10 TABLET ORAL at 11:14

## 2022-01-01 RX ADMIN — TRAZODONE HYDROCHLORIDE 50 MG: 50 TABLET ORAL at 21:55

## 2022-01-01 RX ADMIN — SODIUM CHLORIDE, PRESERVATIVE FREE 10 ML: 5 INJECTION INTRAVENOUS at 22:20

## 2022-01-01 RX ADMIN — INSULIN LISPRO 2 UNITS: 100 INJECTION, SOLUTION INTRAVENOUS; SUBCUTANEOUS at 21:09

## 2022-01-01 RX ADMIN — IPRATROPIUM BROMIDE AND ALBUTEROL SULFATE 1 AMPULE: .5; 2.5 SOLUTION RESPIRATORY (INHALATION) at 22:42

## 2022-01-01 RX ADMIN — Medication: at 23:04

## 2022-01-01 RX ADMIN — CHLORHEXIDINE GLUCONATE 0.12% ORAL RINSE 15 ML: 1.2 LIQUID ORAL at 20:59

## 2022-01-01 RX ADMIN — SODIUM PHOSPHATE, MONOBASIC, MONOHYDRATE 6 MMOL: 276; 142 INJECTION, SOLUTION INTRAVENOUS at 22:48

## 2022-01-01 RX ADMIN — INSULIN GLARGINE 33 UNITS: 100 INJECTION, SOLUTION SUBCUTANEOUS at 22:13

## 2022-01-01 RX ADMIN — PANTOPRAZOLE SODIUM 40 MG: 40 INJECTION, POWDER, FOR SOLUTION INTRAVENOUS at 07:56

## 2022-01-01 RX ADMIN — FENTANYL CITRATE 25 MCG: 50 INJECTION INTRAMUSCULAR; INTRAVENOUS at 15:44

## 2022-01-01 RX ADMIN — IPRATROPIUM BROMIDE AND ALBUTEROL SULFATE 1 AMPULE: 2.5; .5 SOLUTION RESPIRATORY (INHALATION) at 23:58

## 2022-01-01 RX ADMIN — OXYCODONE AND ACETAMINOPHEN 1 TABLET: 10; 325 TABLET ORAL at 20:00

## 2022-01-01 RX ADMIN — INSULIN LISPRO 2 UNITS: 100 INJECTION, SOLUTION INTRAVENOUS; SUBCUTANEOUS at 04:39

## 2022-01-01 RX ADMIN — IPRATROPIUM BROMIDE AND ALBUTEROL SULFATE 1 AMPULE: .5; 2.5 SOLUTION RESPIRATORY (INHALATION) at 18:50

## 2022-01-01 RX ADMIN — EPOETIN ALFA-EPBX 3000 UNITS: 3000 INJECTION, SOLUTION INTRAVENOUS; SUBCUTANEOUS at 09:28

## 2022-01-01 RX ADMIN — CIPROFLOXACIN HYDROCHLORIDE 500 MG: 500 TABLET, FILM COATED ORAL at 21:25

## 2022-01-01 RX ADMIN — DOCUSATE SODIUM 100 MG: 100 CAPSULE, LIQUID FILLED ORAL at 09:45

## 2022-01-01 RX ADMIN — INSULIN GLARGINE 35 UNITS: 100 INJECTION, SOLUTION SUBCUTANEOUS at 20:18

## 2022-01-01 RX ADMIN — CALCIUM GLUCONATE 1000 MG: 98 INJECTION, SOLUTION INTRAVENOUS at 04:42

## 2022-01-01 RX ADMIN — MIDODRINE HYDROCHLORIDE 10 MG: 10 TABLET ORAL at 11:47

## 2022-01-01 RX ADMIN — KETAMINE HYDROCHLORIDE 0.7 MG/KG/HR: 50 INJECTION, SOLUTION INTRAMUSCULAR; INTRAVENOUS at 09:10

## 2022-01-01 RX ADMIN — POTASSIUM CHLORIDE 20 MEQ: 29.8 INJECTION, SOLUTION INTRAVENOUS at 21:21

## 2022-01-01 RX ADMIN — IMMUNE GLOBULIN INFUSION (HUMAN) 20 G: 100 INJECTION, SOLUTION INTRAVENOUS; SUBCUTANEOUS at 20:53

## 2022-01-01 RX ADMIN — POTASSIUM CHLORIDE 20 MEQ: 29.8 INJECTION INTRAVENOUS at 09:33

## 2022-01-01 RX ADMIN — PANTOPRAZOLE SODIUM 40 MG: 40 INJECTION, POWDER, FOR SOLUTION INTRAVENOUS at 08:10

## 2022-01-01 RX ADMIN — IPRATROPIUM BROMIDE AND ALBUTEROL SULFATE 1 AMPULE: .5; 2.5 SOLUTION RESPIRATORY (INHALATION) at 07:39

## 2022-01-01 RX ADMIN — SPIRONOLACTONE 50 MG: 25 TABLET ORAL at 09:54

## 2022-01-01 RX ADMIN — HEPARIN SODIUM 2000 UNITS: 1000 INJECTION INTRAVENOUS; SUBCUTANEOUS at 02:54

## 2022-01-01 RX ADMIN — CETIRIZINE HYDROCHLORIDE 10 MG: 10 TABLET ORAL at 08:42

## 2022-01-01 RX ADMIN — Medication 150 MG: at 11:48

## 2022-01-01 RX ADMIN — CALCIUM GLUCONATE 1000 MG: 98 INJECTION, SOLUTION INTRAVENOUS at 11:08

## 2022-01-01 RX ADMIN — ANORECTAL OINTMENT: 15.7; .44; 24; 20.6 OINTMENT TOPICAL at 08:26

## 2022-01-01 RX ADMIN — NOREPINEPHRINE BITARTRATE 60 MCG/MIN: 1 INJECTION INTRAVENOUS at 08:14

## 2022-01-01 RX ADMIN — SODIUM PHOSPHATE, MONOBASIC, MONOHYDRATE 6 MMOL: 276; 142 INJECTION, SOLUTION INTRAVENOUS at 11:54

## 2022-01-01 RX ADMIN — FENTANYL CITRATE 50 MCG: 50 INJECTION INTRAMUSCULAR; INTRAVENOUS at 12:13

## 2022-01-01 RX ADMIN — INSULIN GLARGINE 15 UNITS: 100 INJECTION, SOLUTION SUBCUTANEOUS at 20:23

## 2022-01-01 RX ADMIN — MAGNESIUM GLUCONATE 500 MG ORAL TABLET 800 MG: 500 TABLET ORAL at 08:43

## 2022-01-01 RX ADMIN — KETAMINE HYDROCHLORIDE 0.1 MG/KG/HR: 50 INJECTION, SOLUTION INTRAMUSCULAR; INTRAVENOUS at 20:24

## 2022-01-01 RX ADMIN — ASPIRIN 81 MG: 81 TABLET, CHEWABLE ORAL at 21:25

## 2022-01-01 RX ADMIN — NOREPINEPHRINE BITARTRATE 6 MCG/MIN: 1 INJECTION INTRAVENOUS at 10:52

## 2022-01-01 RX ADMIN — DOCUSATE SODIUM 100 MG: 100 CAPSULE, LIQUID FILLED ORAL at 09:54

## 2022-01-01 RX ADMIN — VASOPRESSIN 0.03 UNITS/MIN: 20 INJECTION PARENTERAL at 20:48

## 2022-01-01 RX ADMIN — SODIUM CHLORIDE, PRESERVATIVE FREE 10 ML: 5 INJECTION INTRAVENOUS at 08:34

## 2022-01-01 RX ADMIN — SODIUM PHOSPHATE, MONOBASIC, MONOHYDRATE 12 MMOL: 276; 142 INJECTION, SOLUTION INTRAVENOUS at 18:19

## 2022-01-01 RX ADMIN — NOREPINEPHRINE BITARTRATE 50 MCG/MIN: 1 INJECTION INTRAVENOUS at 17:16

## 2022-01-01 RX ADMIN — IPRATROPIUM BROMIDE AND ALBUTEROL SULFATE 1 AMPULE: .5; 2.5 SOLUTION RESPIRATORY (INHALATION) at 16:10

## 2022-01-01 RX ADMIN — NOREPINEPHRINE BITARTRATE 30 MCG/MIN: 1 INJECTION INTRAVENOUS at 20:46

## 2022-01-01 RX ADMIN — EPINEPHRINE 3 MCG/MIN: 1 INJECTION INTRAMUSCULAR; INTRAVENOUS; SUBCUTANEOUS at 05:30

## 2022-01-01 RX ADMIN — CETIRIZINE HYDROCHLORIDE 10 MG: 10 TABLET ORAL at 08:46

## 2022-01-01 RX ADMIN — IPRATROPIUM BROMIDE AND ALBUTEROL SULFATE 1 AMPULE: .5; 2.5 SOLUTION RESPIRATORY (INHALATION) at 07:31

## 2022-01-01 RX ADMIN — Medication: at 04:20

## 2022-01-01 RX ADMIN — IPRATROPIUM BROMIDE AND ALBUTEROL SULFATE 1 AMPULE: 2.5; .5 SOLUTION RESPIRATORY (INHALATION) at 19:32

## 2022-01-01 RX ADMIN — CIPROFLOXACIN HYDROCHLORIDE 500 MG: 500 TABLET, FILM COATED ORAL at 09:54

## 2022-01-01 RX ADMIN — STANDARDIZED SENNA CONCENTRATE AND DOCUSATE SODIUM 2 TABLET: 8.6; 5 TABLET ORAL at 21:14

## 2022-01-01 RX ADMIN — NOREPINEPHRINE BITARTRATE 16 MCG/MIN: 1 INJECTION INTRAVENOUS at 00:17

## 2022-01-01 RX ADMIN — METOPROLOL SUCCINATE 25 MG: 25 TABLET, FILM COATED, EXTENDED RELEASE ORAL at 12:06

## 2022-01-01 RX ADMIN — CALCIUM GLUCONATE 1000 MG: 98 INJECTION, SOLUTION INTRAVENOUS at 22:55

## 2022-01-01 RX ADMIN — MONTELUKAST SODIUM 10 MG: 10 TABLET, COATED ORAL at 08:53

## 2022-01-01 RX ADMIN — ASPIRIN 81 MG: 81 TABLET, CHEWABLE ORAL at 20:44

## 2022-01-01 RX ADMIN — Medication 150 MG: at 19:12

## 2022-01-01 RX ADMIN — SODIUM CHLORIDE, PRESERVATIVE FREE 10 ML: 5 INJECTION INTRAVENOUS at 20:22

## 2022-01-01 RX ADMIN — IPRATROPIUM BROMIDE AND ALBUTEROL SULFATE 1 AMPULE: 2.5; .5 SOLUTION RESPIRATORY (INHALATION) at 23:05

## 2022-01-01 RX ADMIN — CALCIUM GLUCONATE 1000 MG: 98 INJECTION, SOLUTION INTRAVENOUS at 05:21

## 2022-01-01 RX ADMIN — SODIUM CHLORIDE 1000 ML: 9 INJECTION, SOLUTION INTRAVENOUS at 10:37

## 2022-01-01 RX ADMIN — Medication 2 PUFF: at 08:17

## 2022-01-01 RX ADMIN — WHITE PETROLATUM 57.7 %-MINERAL OIL 31.9 % EYE OINTMENT: at 07:58

## 2022-01-01 RX ADMIN — CALCIUM GLUCONATE 1000 MG: 98 INJECTION, SOLUTION INTRAVENOUS at 05:57

## 2022-01-01 RX ADMIN — MEROPENEM 1000 MG: 1 INJECTION, POWDER, FOR SOLUTION INTRAVENOUS at 05:53

## 2022-01-01 RX ADMIN — IPRATROPIUM BROMIDE AND ALBUTEROL SULFATE 1 AMPULE: .5; 2.5 SOLUTION RESPIRATORY (INHALATION) at 07:01

## 2022-01-01 RX ADMIN — STANDARDIZED SENNA CONCENTRATE AND DOCUSATE SODIUM 2 TABLET: 8.6; 5 TABLET ORAL at 08:32

## 2022-01-01 RX ADMIN — ALTEPLASE 2 MG: 2.2 INJECTION, POWDER, LYOPHILIZED, FOR SOLUTION INTRAVENOUS at 06:20

## 2022-01-01 RX ADMIN — POLYETHYLENE GLYCOL (3350) 17 G: 17 POWDER, FOR SOLUTION ORAL at 12:14

## 2022-01-01 RX ADMIN — SODIUM CHLORIDE, PRESERVATIVE FREE 10 ML: 5 INJECTION INTRAVENOUS at 08:50

## 2022-01-01 RX ADMIN — Medication: at 08:24

## 2022-01-01 RX ADMIN — SODIUM CHLORIDE, PRESERVATIVE FREE 10 ML: 5 INJECTION INTRAVENOUS at 07:54

## 2022-01-01 RX ADMIN — ACETAMINOPHEN 650 MG: 325 TABLET ORAL at 14:31

## 2022-01-01 RX ADMIN — Medication: at 07:48

## 2022-01-01 RX ADMIN — CALCIUM GLUCONATE 1000 MG: 98 INJECTION, SOLUTION INTRAVENOUS at 15:26

## 2022-01-01 RX ADMIN — POTASSIUM CHLORIDE 20 MEQ: 29.8 INJECTION INTRAVENOUS at 09:29

## 2022-01-01 RX ADMIN — METOPROLOL SUCCINATE 25 MG: 25 TABLET, FILM COATED, EXTENDED RELEASE ORAL at 22:10

## 2022-01-01 RX ADMIN — DOBUTAMINE 5 MCG/KG/MIN: 12.5 INJECTION, SOLUTION, CONCENTRATE INTRAVENOUS at 02:54

## 2022-01-01 RX ADMIN — INSULIN LISPRO 3 UNITS: 100 INJECTION, SOLUTION INTRAVENOUS; SUBCUTANEOUS at 21:28

## 2022-01-01 RX ADMIN — OXYCODONE AND ACETAMINOPHEN 1 TABLET: 10; 325 TABLET ORAL at 02:00

## 2022-01-01 RX ADMIN — Medication: at 16:24

## 2022-01-01 RX ADMIN — IPRATROPIUM BROMIDE AND ALBUTEROL SULFATE 1 AMPULE: .5; 2.5 SOLUTION RESPIRATORY (INHALATION) at 19:12

## 2022-01-01 RX ADMIN — SODIUM PHOSPHATE, MONOBASIC, MONOHYDRATE 12 MMOL: 276; 142 INJECTION, SOLUTION INTRAVENOUS at 16:02

## 2022-01-01 RX ADMIN — METOPROLOL SUCCINATE 25 MG: 25 TABLET, FILM COATED, EXTENDED RELEASE ORAL at 21:25

## 2022-01-01 RX ADMIN — HYDRALAZINE HYDROCHLORIDE 10 MG: 10 TABLET, FILM COATED ORAL at 09:54

## 2022-01-01 RX ADMIN — INSULIN LISPRO 1 UNITS: 100 INJECTION, SOLUTION INTRAVENOUS; SUBCUTANEOUS at 20:59

## 2022-01-01 RX ADMIN — OXYCODONE HYDROCHLORIDE AND ACETAMINOPHEN 1 TABLET: 7.5; 325 TABLET ORAL at 01:00

## 2022-01-01 RX ADMIN — CALCIUM GLUCONATE 1000 MG: 98 INJECTION, SOLUTION INTRAVENOUS at 16:24

## 2022-01-01 RX ADMIN — FENTANYL CITRATE 50 MCG: 50 INJECTION INTRAMUSCULAR; INTRAVENOUS at 20:53

## 2022-01-01 RX ADMIN — FERROUS SULFATE TAB 325 MG (65 MG ELEMENTAL FE) 325 MG: 325 (65 FE) TAB at 08:32

## 2022-01-01 RX ADMIN — INSULIN LISPRO 4 UNITS: 100 INJECTION, SOLUTION INTRAVENOUS; SUBCUTANEOUS at 12:54

## 2022-01-01 RX ADMIN — Medication 2 PUFF: at 19:53

## 2022-01-01 RX ADMIN — TORSEMIDE 100 MG: 100 TABLET ORAL at 08:24

## 2022-01-01 RX ADMIN — INSULIN LISPRO 4 UNITS: 100 INJECTION, SOLUTION INTRAVENOUS; SUBCUTANEOUS at 11:55

## 2022-01-01 RX ADMIN — STANDARDIZED SENNA CONCENTRATE AND DOCUSATE SODIUM 2 TABLET: 8.6; 5 TABLET ORAL at 07:52

## 2022-01-01 RX ADMIN — MEROPENEM 1000 MG: 1 INJECTION, POWDER, FOR SOLUTION INTRAVENOUS at 01:58

## 2022-01-01 RX ADMIN — INSULIN GLARGINE 25 UNITS: 100 INJECTION, SOLUTION SUBCUTANEOUS at 20:17

## 2022-01-01 RX ADMIN — Medication: at 21:03

## 2022-01-01 RX ADMIN — KETAMINE HYDROCHLORIDE 0.6 MG/KG/HR: 50 INJECTION, SOLUTION INTRAMUSCULAR; INTRAVENOUS at 05:16

## 2022-01-01 RX ADMIN — LORAZEPAM 1 MG: 2 INJECTION INTRAMUSCULAR; INTRAVENOUS at 14:19

## 2022-01-01 RX ADMIN — Medication 150 MG: at 02:43

## 2022-01-01 RX ADMIN — TIGECYCLINE 50 MG: 50 INJECTION, POWDER, LYOPHILIZED, FOR SOLUTION INTRAVENOUS at 21:37

## 2022-01-01 RX ADMIN — METOPROLOL SUCCINATE 25 MG: 25 TABLET, FILM COATED, EXTENDED RELEASE ORAL at 21:24

## 2022-01-01 RX ADMIN — OXYCODONE AND ACETAMINOPHEN 1 TABLET: 10; 325 TABLET ORAL at 01:00

## 2022-01-01 RX ADMIN — CIPROFLOXACIN HYDROCHLORIDE 500 MG: 500 TABLET, FILM COATED ORAL at 22:10

## 2022-01-01 RX ADMIN — CHLORHEXIDINE GLUCONATE 0.12% ORAL RINSE 15 ML: 1.2 LIQUID ORAL at 20:22

## 2022-01-01 RX ADMIN — WHITE PETROLATUM 57.7 %-MINERAL OIL 31.9 % EYE OINTMENT: at 02:52

## 2022-01-01 RX ADMIN — ALBUMIN (HUMAN) 50 G: 0.25 INJECTION, SOLUTION INTRAVENOUS at 08:18

## 2022-01-01 RX ADMIN — IPRATROPIUM BROMIDE AND ALBUTEROL SULFATE 1 AMPULE: 2.5; .5 SOLUTION RESPIRATORY (INHALATION) at 15:44

## 2022-01-01 RX ADMIN — OXYCODONE HYDROCHLORIDE AND ACETAMINOPHEN 1 TABLET: 7.5; 325 TABLET ORAL at 18:22

## 2022-01-01 RX ADMIN — DOBUTAMINE HYDROCHLORIDE 5 MCG/KG/MIN: 200 INJECTION INTRAVENOUS at 02:00

## 2022-01-01 RX ADMIN — SODIUM PHOSPHATE, MONOBASIC, MONOHYDRATE 6 MMOL: 276; 142 INJECTION, SOLUTION INTRAVENOUS at 09:57

## 2022-01-01 RX ADMIN — NOREPINEPHRINE BITARTRATE 8 MCG/MIN: 1 INJECTION INTRAVENOUS at 07:27

## 2022-01-01 RX ADMIN — OXYCODONE AND ACETAMINOPHEN 1 TABLET: 10; 325 TABLET ORAL at 00:12

## 2022-01-01 RX ADMIN — Medication: at 18:11

## 2022-01-01 RX ADMIN — ACETAMINOPHEN 650 MG: 325 TABLET ORAL at 03:41

## 2022-01-01 RX ADMIN — CARBOXYMETHYLCELLULOSE SODIUM 1 DROP: 10 GEL OPHTHALMIC at 16:44

## 2022-01-01 RX ADMIN — POLYETHYLENE GLYCOL (3350) 17 G: 17 POWDER, FOR SOLUTION ORAL at 09:55

## 2022-01-01 RX ADMIN — ANORECTAL OINTMENT: 15.7; .44; 24; 20.6 OINTMENT TOPICAL at 07:56

## 2022-01-01 RX ADMIN — SODIUM CHLORIDE 2000 ML: 9 INJECTION, SOLUTION INTRAVENOUS at 17:00

## 2022-01-01 RX ADMIN — CALCIUM GLUCONATE 1000 MG: 98 INJECTION, SOLUTION INTRAVENOUS at 23:11

## 2022-01-01 RX ADMIN — VANCOMYCIN HYDROCHLORIDE 1500 MG: 10 INJECTION, POWDER, LYOPHILIZED, FOR SOLUTION INTRAVENOUS at 16:37

## 2022-01-01 RX ADMIN — INSULIN LISPRO 4 UNITS: 100 INJECTION, SOLUTION INTRAVENOUS; SUBCUTANEOUS at 09:57

## 2022-01-01 RX ADMIN — ANORECTAL OINTMENT: 15.7; .44; 24; 20.6 OINTMENT TOPICAL at 08:14

## 2022-01-01 RX ADMIN — SODIUM PHOSPHATE, MONOBASIC, MONOHYDRATE 12 MMOL: 276; 142 INJECTION, SOLUTION INTRAVENOUS at 10:36

## 2022-01-01 RX ADMIN — CIPROFLOXACIN HYDROCHLORIDE 500 MG: 500 TABLET, FILM COATED ORAL at 21:55

## 2022-01-01 RX ADMIN — CALCIUM GLUCONATE 1000 MG: 98 INJECTION, SOLUTION INTRAVENOUS at 09:32

## 2022-01-01 RX ADMIN — KETAMINE HYDROCHLORIDE 0.8 MG/KG/HR: 50 INJECTION, SOLUTION INTRAMUSCULAR; INTRAVENOUS at 15:58

## 2022-01-01 RX ADMIN — MAGNESIUM GLUCONATE 500 MG ORAL TABLET 400 MG: 500 TABLET ORAL at 21:13

## 2022-01-01 RX ADMIN — METOPROLOL SUCCINATE 25 MG: 25 TABLET, FILM COATED, EXTENDED RELEASE ORAL at 20:45

## 2022-01-01 RX ADMIN — MONTELUKAST SODIUM 10 MG: 10 TABLET, COATED ORAL at 08:39

## 2022-01-01 RX ADMIN — MIDODRINE HYDROCHLORIDE 15 MG: 5 TABLET ORAL at 11:58

## 2022-01-01 RX ADMIN — APIXABAN 2.5 MG: 5 TABLET, FILM COATED ORAL at 07:44

## 2022-01-01 RX ADMIN — CEFTAZIDIME 1000 MG: 1 INJECTION, POWDER, FOR SOLUTION INTRAMUSCULAR; INTRAVENOUS at 21:03

## 2022-01-01 RX ADMIN — APIXABAN 2.5 MG: 5 TABLET, FILM COATED ORAL at 20:06

## 2022-01-01 RX ADMIN — FERROUS SULFATE TAB 325 MG (65 MG ELEMENTAL FE) 325 MG: 325 (65 FE) TAB at 12:07

## 2022-01-01 RX ADMIN — MONTELUKAST SODIUM 10 MG: 10 TABLET, COATED ORAL at 08:32

## 2022-01-01 RX ADMIN — MIDODRINE HYDROCHLORIDE 15 MG: 5 TABLET ORAL at 08:10

## 2022-01-01 RX ADMIN — Medication: at 04:15

## 2022-01-01 RX ADMIN — INSULIN LISPRO 3 UNITS: 100 INJECTION, SOLUTION INTRAVENOUS; SUBCUTANEOUS at 11:15

## 2022-01-01 RX ADMIN — INSULIN LISPRO 4 UNITS: 100 INJECTION, SOLUTION INTRAVENOUS; SUBCUTANEOUS at 21:25

## 2022-01-01 RX ADMIN — SODIUM PHOSPHATE, MONOBASIC, MONOHYDRATE 12 MMOL: 276; 142 INJECTION, SOLUTION INTRAVENOUS at 15:25

## 2022-01-01 RX ADMIN — CLINDAMYCIN PHOSPHATE 900 MG: 900 INJECTION, SOLUTION INTRAVENOUS at 18:12

## 2022-01-01 RX ADMIN — SODIUM PHOSPHATE, MONOBASIC, MONOHYDRATE 6 MMOL: 276; 142 INJECTION, SOLUTION INTRAVENOUS at 13:06

## 2022-01-01 RX ADMIN — CLINDAMYCIN PHOSPHATE 900 MG: 900 INJECTION, SOLUTION INTRAVENOUS at 18:59

## 2022-01-01 RX ADMIN — IPRATROPIUM BROMIDE AND ALBUTEROL SULFATE 1 AMPULE: .5; 2.5 SOLUTION RESPIRATORY (INHALATION) at 03:04

## 2022-01-01 RX ADMIN — FENTANYL CITRATE 50 MCG: 50 INJECTION INTRAMUSCULAR; INTRAVENOUS at 04:00

## 2022-01-01 RX ADMIN — INSULIN GLARGINE 25 UNITS: 100 INJECTION, SOLUTION SUBCUTANEOUS at 21:39

## 2022-01-01 RX ADMIN — Medication 150 MG: at 18:33

## 2022-01-01 RX ADMIN — INSULIN LISPRO 3 UNITS: 100 INJECTION, SOLUTION INTRAVENOUS; SUBCUTANEOUS at 04:58

## 2022-01-01 RX ADMIN — FENTANYL CITRATE 50 MCG: 50 INJECTION, SOLUTION INTRAMUSCULAR; INTRAVENOUS at 00:53

## 2022-01-01 RX ADMIN — Medication 2 PUFF: at 08:36

## 2022-01-01 RX ADMIN — IPRATROPIUM BROMIDE AND ALBUTEROL SULFATE 1 AMPULE: .5; 2.5 SOLUTION RESPIRATORY (INHALATION) at 11:47

## 2022-01-01 RX ADMIN — Medication 2 PUFF: at 08:25

## 2022-01-01 RX ADMIN — OXYCODONE HYDROCHLORIDE AND ACETAMINOPHEN 1 TABLET: 5; 325 TABLET ORAL at 05:00

## 2022-01-01 RX ADMIN — CEFTAZIDIME 1000 MG: 1 INJECTION, POWDER, FOR SOLUTION INTRAMUSCULAR; INTRAVENOUS at 21:12

## 2022-01-01 RX ADMIN — MAGNESIUM GLUCONATE 500 MG ORAL TABLET 800 MG: 500 TABLET ORAL at 22:24

## 2022-01-01 RX ADMIN — SODIUM PHOSPHATE, MONOBASIC, MONOHYDRATE 6 MMOL: 276; 142 INJECTION, SOLUTION INTRAVENOUS at 05:21

## 2022-01-01 RX ADMIN — IPRATROPIUM BROMIDE AND ALBUTEROL SULFATE 1 AMPULE: 2.5; .5 SOLUTION RESPIRATORY (INHALATION) at 11:59

## 2022-01-01 RX ADMIN — METOPROLOL SUCCINATE 25 MG: 25 TABLET, EXTENDED RELEASE ORAL at 17:28

## 2022-01-01 RX ADMIN — HYDROCORTISONE SODIUM SUCCINATE 100 MG: 100 INJECTION, POWDER, FOR SOLUTION INTRAMUSCULAR; INTRAVENOUS at 18:16

## 2022-01-01 RX ADMIN — ANORECTAL OINTMENT: 15.7; .44; 24; 20.6 OINTMENT TOPICAL at 12:32

## 2022-01-01 RX ADMIN — CHLORHEXIDINE GLUCONATE 0.12% ORAL RINSE 15 ML: 1.2 LIQUID ORAL at 19:48

## 2022-01-01 RX ADMIN — HYDRALAZINE HYDROCHLORIDE 10 MG: 10 TABLET, FILM COATED ORAL at 21:25

## 2022-01-01 RX ADMIN — Medication 150 MG: at 02:47

## 2022-01-01 RX ADMIN — MEROPENEM 1000 MG: 1 INJECTION, POWDER, FOR SOLUTION INTRAVENOUS at 18:17

## 2022-01-01 RX ADMIN — VANCOMYCIN HYDROCHLORIDE 1000 MG: 10 INJECTION, POWDER, LYOPHILIZED, FOR SOLUTION INTRAVENOUS at 14:44

## 2022-01-01 RX ADMIN — COLLAGENASE SANTYL: 250 OINTMENT TOPICAL at 11:24

## 2022-01-01 RX ADMIN — IPRATROPIUM BROMIDE AND ALBUTEROL SULFATE 1 AMPULE: .5; 2.5 SOLUTION RESPIRATORY (INHALATION) at 18:52

## 2022-01-01 RX ADMIN — FENTANYL CITRATE 50 MCG: 50 INJECTION INTRAMUSCULAR; INTRAVENOUS at 00:30

## 2022-01-01 RX ADMIN — IPRATROPIUM BROMIDE AND ALBUTEROL SULFATE 1 AMPULE: 2.5; .5 SOLUTION RESPIRATORY (INHALATION) at 19:07

## 2022-01-01 RX ADMIN — KETAMINE HYDROCHLORIDE 0.7 MG/KG/HR: 50 INJECTION, SOLUTION INTRAMUSCULAR; INTRAVENOUS at 04:38

## 2022-01-01 RX ADMIN — TIGECYCLINE 50 MG: 50 INJECTION, POWDER, LYOPHILIZED, FOR SOLUTION INTRAVENOUS at 22:45

## 2022-01-01 RX ADMIN — CALCIUM GLUCONATE 1000 MG: 98 INJECTION, SOLUTION INTRAVENOUS at 17:32

## 2022-01-01 RX ADMIN — CALCIUM GLUCONATE 1000 MG: 98 INJECTION, SOLUTION INTRAVENOUS at 23:02

## 2022-01-01 RX ADMIN — ANORECTAL OINTMENT: 15.7; .44; 24; 20.6 OINTMENT TOPICAL at 09:55

## 2022-01-01 RX ADMIN — ASPIRIN 81 MG 81 MG: 81 TABLET ORAL at 21:18

## 2022-01-01 RX ADMIN — CALCIUM GLUCONATE 1000 MG: 98 INJECTION, SOLUTION INTRAVENOUS at 22:47

## 2022-01-01 RX ADMIN — CARBOXYMETHYLCELLULOSE SODIUM 1 DROP: 10 GEL OPHTHALMIC at 12:54

## 2022-01-01 RX ADMIN — STANDARDIZED SENNA CONCENTRATE AND DOCUSATE SODIUM 2 TABLET: 8.6; 5 TABLET ORAL at 11:14

## 2022-01-01 RX ADMIN — HEPARIN SODIUM 1050 UNITS/HR: 10000 INJECTION, SOLUTION INTRAVENOUS; SUBCUTANEOUS at 10:11

## 2022-01-01 RX ADMIN — CETIRIZINE HYDROCHLORIDE 10 MG: 10 TABLET ORAL at 08:39

## 2022-01-01 RX ADMIN — FENTANYL CITRATE 50 MCG: 50 INJECTION INTRAMUSCULAR; INTRAVENOUS at 01:37

## 2022-01-01 RX ADMIN — PANTOPRAZOLE SODIUM 40 MG: 40 INJECTION, POWDER, FOR SOLUTION INTRAVENOUS at 08:08

## 2022-01-01 RX ADMIN — MONTELUKAST SODIUM 10 MG: 10 TABLET, COATED ORAL at 08:43

## 2022-01-01 RX ADMIN — ANORECTAL OINTMENT: 15.7; .44; 24; 20.6 OINTMENT TOPICAL at 08:07

## 2022-01-01 RX ADMIN — SODIUM PHOSPHATE, MONOBASIC, MONOHYDRATE 12 MMOL: 276; 142 INJECTION, SOLUTION INTRAVENOUS at 07:08

## 2022-01-01 RX ADMIN — ANORECTAL OINTMENT: 15.7; .44; 24; 20.6 OINTMENT TOPICAL at 12:05

## 2022-01-01 RX ADMIN — IPRATROPIUM BROMIDE AND ALBUTEROL SULFATE 1 AMPULE: 2.5; .5 SOLUTION RESPIRATORY (INHALATION) at 15:32

## 2022-01-01 RX ADMIN — MIDAZOLAM HYDROCHLORIDE 2 MG: 2 INJECTION, SOLUTION INTRAMUSCULAR; INTRAVENOUS at 11:00

## 2022-01-01 RX ADMIN — KETAMINE HYDROCHLORIDE 0.65 MG/KG/HR: 50 INJECTION, SOLUTION INTRAMUSCULAR; INTRAVENOUS at 14:01

## 2022-01-01 RX ADMIN — PROPOFOL 10 MCG/KG/MIN: 10 INJECTION, EMULSION INTRAVENOUS at 11:09

## 2022-01-01 RX ADMIN — FERROUS SULFATE TAB 325 MG (65 MG ELEMENTAL FE) 325 MG: 325 (65 FE) TAB at 08:34

## 2022-01-01 RX ADMIN — APIXABAN 2.5 MG: 5 TABLET, FILM COATED ORAL at 08:47

## 2022-01-01 RX ADMIN — IPRATROPIUM BROMIDE AND ALBUTEROL SULFATE 1 AMPULE: 2.5; .5 SOLUTION RESPIRATORY (INHALATION) at 03:16

## 2022-01-01 RX ADMIN — ASPIRIN 81 MG 81 MG: 81 TABLET ORAL at 21:01

## 2022-01-01 RX ADMIN — IPRATROPIUM BROMIDE AND ALBUTEROL SULFATE 1 AMPULE: 2.5; .5 SOLUTION RESPIRATORY (INHALATION) at 03:17

## 2022-01-01 RX ADMIN — CIPROFLOXACIN HYDROCHLORIDE 500 MG: 500 TABLET, FILM COATED ORAL at 21:07

## 2022-01-01 RX ADMIN — OXYCODONE HYDROCHLORIDE AND ACETAMINOPHEN 1 TABLET: 7.5; 325 TABLET ORAL at 04:34

## 2022-01-01 RX ADMIN — HEPARIN SODIUM 4000 UNITS: 1000 INJECTION INTRAVENOUS; SUBCUTANEOUS at 10:00

## 2022-01-01 RX ADMIN — CLINDAMYCIN PHOSPHATE 900 MG: 900 INJECTION, SOLUTION INTRAVENOUS at 17:45

## 2022-01-01 RX ADMIN — CALCIUM GLUCONATE 1000 MG: 98 INJECTION, SOLUTION INTRAVENOUS at 03:09

## 2022-01-01 RX ADMIN — ASPIRIN 81 MG: 81 TABLET, CHEWABLE ORAL at 20:14

## 2022-01-01 RX ADMIN — INSULIN LISPRO 6 UNITS: 100 INJECTION, SOLUTION INTRAVENOUS; SUBCUTANEOUS at 17:29

## 2022-01-01 RX ADMIN — OXYCODONE AND ACETAMINOPHEN 1 TABLET: 10; 325 TABLET ORAL at 08:09

## 2022-01-01 RX ADMIN — IPRATROPIUM BROMIDE AND ALBUTEROL SULFATE 1 AMPULE: .5; 2.5 SOLUTION RESPIRATORY (INHALATION) at 07:10

## 2022-01-01 RX ADMIN — COLLAGENASE SANTYL: 250 OINTMENT TOPICAL at 07:34

## 2022-01-01 RX ADMIN — INSULIN LISPRO 3 UNITS: 100 INJECTION, SOLUTION INTRAVENOUS; SUBCUTANEOUS at 16:49

## 2022-01-01 RX ADMIN — HYDRALAZINE HYDROCHLORIDE 10 MG: 10 TABLET, FILM COATED ORAL at 21:07

## 2022-01-01 RX ADMIN — MIDAZOLAM HYDROCHLORIDE 4 MG: 2 INJECTION, SOLUTION INTRAMUSCULAR; INTRAVENOUS at 07:50

## 2022-01-01 RX ADMIN — POTASSIUM CHLORIDE 20 MEQ: 29.8 INJECTION INTRAVENOUS at 11:36

## 2022-01-01 RX ADMIN — SODIUM PHOSPHATE, MONOBASIC, MONOHYDRATE 6 MMOL: 276; 142 INJECTION, SOLUTION INTRAVENOUS at 16:59

## 2022-01-01 RX ADMIN — TOBRAMYCIN 300 MG: 300 SOLUTION RESPIRATORY (INHALATION) at 22:43

## 2022-01-01 RX ADMIN — MIDODRINE HYDROCHLORIDE 10 MG: 10 TABLET ORAL at 16:12

## 2022-01-01 RX ADMIN — CHLORHEXIDINE GLUCONATE 0.12% ORAL RINSE 15 ML: 1.2 LIQUID ORAL at 20:20

## 2022-01-01 RX ADMIN — OXYCODONE AND ACETAMINOPHEN 1 TABLET: 10; 325 TABLET ORAL at 02:54

## 2022-01-01 RX ADMIN — DOCUSATE SODIUM 100 MG: 100 CAPSULE, LIQUID FILLED ORAL at 15:31

## 2022-01-01 RX ADMIN — CEFTRIAXONE SODIUM 1000 MG: 1 INJECTION, POWDER, FOR SOLUTION INTRAMUSCULAR; INTRAVENOUS at 08:43

## 2022-01-01 RX ADMIN — MIDODRINE HYDROCHLORIDE 10 MG: 10 TABLET ORAL at 16:57

## 2022-01-01 RX ADMIN — IPRATROPIUM BROMIDE AND ALBUTEROL SULFATE 1 AMPULE: .5; 2.5 SOLUTION RESPIRATORY (INHALATION) at 15:16

## 2022-01-01 RX ADMIN — NOREPINEPHRINE BITARTRATE 60 MCG/MIN: 1 INJECTION INTRAVENOUS at 13:02

## 2022-01-01 RX ADMIN — SENNOSIDES 8.6 MG: 8.6 TABLET, COATED ORAL at 20:00

## 2022-01-01 RX ADMIN — TIGECYCLINE 50 MG: 50 INJECTION, POWDER, LYOPHILIZED, FOR SOLUTION INTRAVENOUS at 23:08

## 2022-01-01 RX ADMIN — INSULIN GLARGINE 33 UNITS: 100 INJECTION, SOLUTION SUBCUTANEOUS at 21:08

## 2022-01-01 RX ADMIN — SODIUM PHOSPHATE, MONOBASIC, MONOHYDRATE 6 MMOL: 276; 142 INJECTION, SOLUTION INTRAVENOUS at 11:25

## 2022-01-01 RX ADMIN — INSULIN LISPRO 3 UNITS: 100 INJECTION, SOLUTION INTRAVENOUS; SUBCUTANEOUS at 12:45

## 2022-01-01 RX ADMIN — ANORECTAL OINTMENT: 15.7; .44; 24; 20.6 OINTMENT TOPICAL at 21:14

## 2022-01-01 RX ADMIN — TORSEMIDE 100 MG: 100 TABLET ORAL at 08:15

## 2022-01-01 RX ADMIN — INSULIN LISPRO 4 UNITS: 100 INJECTION, SOLUTION INTRAVENOUS; SUBCUTANEOUS at 12:29

## 2022-01-01 RX ADMIN — IPRATROPIUM BROMIDE AND ALBUTEROL SULFATE 1 AMPULE: .5; 2.5 SOLUTION RESPIRATORY (INHALATION) at 18:57

## 2022-01-01 RX ADMIN — Medication 150 MG: at 12:21

## 2022-01-01 RX ADMIN — INSULIN LISPRO 3 UNITS: 100 INJECTION, SOLUTION INTRAVENOUS; SUBCUTANEOUS at 17:09

## 2022-01-01 RX ADMIN — NOREPINEPHRINE BITARTRATE 13 MCG/MIN: 1 INJECTION INTRAVENOUS at 09:20

## 2022-01-01 RX ADMIN — TOBRAMYCIN 300 MG: 300 SOLUTION RESPIRATORY (INHALATION) at 18:54

## 2022-01-01 RX ADMIN — IPRATROPIUM BROMIDE AND ALBUTEROL SULFATE 1 AMPULE: .5; 2.5 SOLUTION RESPIRATORY (INHALATION) at 23:55

## 2022-01-01 RX ADMIN — SODIUM CHLORIDE, PRESERVATIVE FREE 10 ML: 5 INJECTION INTRAVENOUS at 09:45

## 2022-01-01 RX ADMIN — TOBRAMYCIN 300 MG: 300 SOLUTION RESPIRATORY (INHALATION) at 21:00

## 2022-01-01 RX ADMIN — SODIUM CHLORIDE, PRESERVATIVE FREE 10 ML: 5 INJECTION INTRAVENOUS at 10:00

## 2022-01-01 RX ADMIN — INSULIN LISPRO 6 UNITS: 100 INJECTION, SOLUTION INTRAVENOUS; SUBCUTANEOUS at 11:51

## 2022-01-01 RX ADMIN — IPRATROPIUM BROMIDE AND ALBUTEROL SULFATE 1 AMPULE: .5; 2.5 SOLUTION RESPIRATORY (INHALATION) at 11:38

## 2022-01-01 RX ADMIN — DEXTROSE AND SODIUM CHLORIDE: 5; 900 INJECTION, SOLUTION INTRAVENOUS at 17:06

## 2022-01-01 RX ADMIN — INSULIN GLARGINE 25 UNITS: 100 INJECTION, SOLUTION SUBCUTANEOUS at 08:34

## 2022-01-01 RX ADMIN — IPRATROPIUM BROMIDE AND ALBUTEROL SULFATE 1 AMPULE: 2.5; .5 SOLUTION RESPIRATORY (INHALATION) at 11:36

## 2022-01-01 RX ADMIN — Medication: at 14:33

## 2022-01-01 RX ADMIN — CARBOXYMETHYLCELLULOSE SODIUM 1 DROP: 10 GEL OPHTHALMIC at 05:30

## 2022-01-01 RX ADMIN — Medication 2760 UNITS/HR: at 21:30

## 2022-01-01 RX ADMIN — PANTOPRAZOLE SODIUM 40 MG: 40 INJECTION, POWDER, FOR SOLUTION INTRAVENOUS at 07:54

## 2022-01-01 RX ADMIN — CIPROFLOXACIN HYDROCHLORIDE 500 MG: 500 TABLET, FILM COATED ORAL at 09:09

## 2022-01-01 RX ADMIN — MINERAL OIL AND WHITE PETROLATUM: 150; 830 OINTMENT OPHTHALMIC at 07:56

## 2022-01-01 RX ADMIN — INSULIN GLARGINE 25 UNITS: 100 INJECTION, SOLUTION SUBCUTANEOUS at 22:10

## 2022-01-01 RX ADMIN — ASPIRIN 81 MG 81 MG: 81 TABLET ORAL at 20:15

## 2022-01-01 RX ADMIN — INSULIN LISPRO 2 UNITS: 100 INJECTION, SOLUTION INTRAVENOUS; SUBCUTANEOUS at 11:44

## 2022-01-01 RX ADMIN — CHLORHEXIDINE GLUCONATE 0.12% ORAL RINSE 15 ML: 1.2 LIQUID ORAL at 20:56

## 2022-01-01 RX ADMIN — IPRATROPIUM BROMIDE AND ALBUTEROL SULFATE 1 AMPULE: .5; 2.5 SOLUTION RESPIRATORY (INHALATION) at 07:53

## 2022-01-01 RX ADMIN — FUROSEMIDE 20 MG/HR: 10 INJECTION, SOLUTION INTRAVENOUS at 15:17

## 2022-01-01 RX ADMIN — CETIRIZINE HYDROCHLORIDE 10 MG: 10 TABLET ORAL at 12:07

## 2022-01-01 RX ADMIN — SODIUM CHLORIDE, PRESERVATIVE FREE 10 ML: 5 INJECTION INTRAVENOUS at 08:35

## 2022-01-01 RX ADMIN — IPRATROPIUM BROMIDE AND ALBUTEROL SULFATE 1 AMPULE: 2.5; .5 SOLUTION RESPIRATORY (INHALATION) at 03:28

## 2022-01-01 RX ADMIN — MIDODRINE HYDROCHLORIDE 10 MG: 10 TABLET ORAL at 08:47

## 2022-01-01 RX ADMIN — ASPIRIN 81 MG 81 MG: 81 TABLET ORAL at 20:06

## 2022-01-01 RX ADMIN — IPRATROPIUM BROMIDE AND ALBUTEROL SULFATE 1 AMPULE: 2.5; .5 SOLUTION RESPIRATORY (INHALATION) at 08:03

## 2022-01-01 RX ADMIN — TRAZODONE HYDROCHLORIDE 50 MG: 50 TABLET ORAL at 21:11

## 2022-01-01 RX ADMIN — Medication 1050 UNITS/HR: at 11:42

## 2022-01-01 RX ADMIN — SODIUM CHLORIDE: 9 INJECTION, SOLUTION INTRAVENOUS at 20:30

## 2022-01-01 RX ADMIN — FERROUS SULFATE TAB 325 MG (65 MG ELEMENTAL FE) 325 MG: 325 (65 FE) TAB at 08:29

## 2022-01-01 RX ADMIN — CHLORHEXIDINE GLUCONATE 0.12% ORAL RINSE 15 ML: 1.2 LIQUID ORAL at 21:52

## 2022-01-01 RX ADMIN — SODIUM CHLORIDE 30 MG/ML INHALATION SOLUTION 4 ML: 30 SOLUTION INHALANT at 07:56

## 2022-01-01 RX ADMIN — MIDAZOLAM HYDROCHLORIDE 2 MG: 2 INJECTION, SOLUTION INTRAMUSCULAR; INTRAVENOUS at 11:55

## 2022-01-01 RX ADMIN — Medication 2 PUFF: at 08:23

## 2022-01-01 RX ADMIN — POTASSIUM CHLORIDE 20 MEQ: 29.8 INJECTION INTRAVENOUS at 07:09

## 2022-01-01 RX ADMIN — ANORECTAL OINTMENT: 15.7; .44; 24; 20.6 OINTMENT TOPICAL at 07:28

## 2022-01-01 RX ADMIN — ASPIRIN 81 MG 81 MG: 81 TABLET ORAL at 20:51

## 2022-01-01 RX ADMIN — CEFTAZIDIME 1000 MG: 1 INJECTION, POWDER, FOR SOLUTION INTRAMUSCULAR; INTRAVENOUS at 14:10

## 2022-01-01 RX ADMIN — FENTANYL CITRATE 50 MCG: 50 INJECTION INTRAMUSCULAR; INTRAVENOUS at 11:58

## 2022-01-01 RX ADMIN — CALCIUM GLUCONATE 1000 MG: 98 INJECTION, SOLUTION INTRAVENOUS at 23:21

## 2022-01-01 RX ADMIN — IPRATROPIUM BROMIDE AND ALBUTEROL SULFATE 1 AMPULE: .5; 2.5 SOLUTION RESPIRATORY (INHALATION) at 18:53

## 2022-01-01 RX ADMIN — CALCIUM GLUCONATE 1000 MG: 98 INJECTION, SOLUTION INTRAVENOUS at 23:05

## 2022-01-01 RX ADMIN — WHITE PETROLATUM 57.7 %-MINERAL OIL 31.9 % EYE OINTMENT: at 14:57

## 2022-01-01 RX ADMIN — FENTANYL CITRATE 50 MCG: 50 INJECTION INTRAMUSCULAR; INTRAVENOUS at 18:21

## 2022-01-01 RX ADMIN — PANTOPRAZOLE SODIUM 40 MG: 40 TABLET, DELAYED RELEASE ORAL at 05:09

## 2022-01-01 RX ADMIN — TRAZODONE HYDROCHLORIDE 50 MG: 50 TABLET ORAL at 20:20

## 2022-01-01 RX ADMIN — INSULIN LISPRO 6 UNITS: 100 INJECTION, SOLUTION INTRAVENOUS; SUBCUTANEOUS at 05:07

## 2022-01-01 RX ADMIN — ANORECTAL OINTMENT: 15.7; .44; 24; 20.6 OINTMENT TOPICAL at 07:45

## 2022-01-01 RX ADMIN — INSULIN LISPRO 3 UNITS: 100 INJECTION, SOLUTION INTRAVENOUS; SUBCUTANEOUS at 21:40

## 2022-01-01 RX ADMIN — OXYCODONE HYDROCHLORIDE AND ACETAMINOPHEN 1 TABLET: 7.5; 325 TABLET ORAL at 22:53

## 2022-01-01 RX ADMIN — FUROSEMIDE 10 MG/HR: 10 INJECTION, SOLUTION INTRAVENOUS at 13:10

## 2022-01-01 RX ADMIN — CIPROFLOXACIN HYDROCHLORIDE 500 MG: 500 TABLET, FILM COATED ORAL at 20:57

## 2022-01-01 RX ADMIN — WHITE PETROLATUM 57.7 %-MINERAL OIL 31.9 % EYE OINTMENT: at 14:47

## 2022-01-01 RX ADMIN — CARBOXYMETHYLCELLULOSE SODIUM 1 DROP: 10 GEL OPHTHALMIC at 01:30

## 2022-01-01 RX ADMIN — MIDAZOLAM HYDROCHLORIDE 2 MG: 2 INJECTION, SOLUTION INTRAMUSCULAR; INTRAVENOUS at 04:32

## 2022-01-01 RX ADMIN — ALBUMIN (HUMAN) 25 G: 0.25 INJECTION, SOLUTION INTRAVENOUS at 17:40

## 2022-01-01 RX ADMIN — APIXABAN 5 MG: 5 TABLET, FILM COATED ORAL at 20:06

## 2022-01-01 RX ADMIN — VASOPRESSIN 0.03 UNITS/MIN: 20 INJECTION PARENTERAL at 01:32

## 2022-01-01 RX ADMIN — OXYCODONE HYDROCHLORIDE AND ACETAMINOPHEN 1 TABLET: 5; 325 TABLET ORAL at 08:05

## 2022-01-01 RX ADMIN — APIXABAN 5 MG: 5 TABLET, FILM COATED ORAL at 08:15

## 2022-01-01 RX ADMIN — CEFTRIAXONE SODIUM 1000 MG: 1 INJECTION, POWDER, FOR SOLUTION INTRAMUSCULAR; INTRAVENOUS at 08:39

## 2022-01-01 RX ADMIN — SENNOSIDES 8.6 MG: 8.6 TABLET, COATED ORAL at 20:20

## 2022-01-01 RX ADMIN — CIPROFLOXACIN HYDROCHLORIDE 500 MG: 500 TABLET, FILM COATED ORAL at 09:45

## 2022-01-01 RX ADMIN — CEFTAZIDIME 1000 MG: 1 INJECTION, POWDER, FOR SOLUTION INTRAMUSCULAR; INTRAVENOUS at 21:05

## 2022-01-01 RX ADMIN — INSULIN LISPRO 12 UNITS: 100 INJECTION, SOLUTION INTRAVENOUS; SUBCUTANEOUS at 08:40

## 2022-01-01 RX ADMIN — ACETAMINOPHEN 650 MG: 325 TABLET ORAL at 22:20

## 2022-01-01 RX ADMIN — MONTELUKAST SODIUM 10 MG: 10 TABLET, COATED ORAL at 08:47

## 2022-01-01 RX ADMIN — INSULIN LISPRO 4 UNITS: 100 INJECTION, SOLUTION INTRAVENOUS; SUBCUTANEOUS at 13:10

## 2022-01-01 RX ADMIN — Medication 150 MG: at 12:02

## 2022-01-01 RX ADMIN — VASOPRESSIN 0.04 UNITS/MIN: 20 INJECTION PARENTERAL at 16:12

## 2022-01-01 RX ADMIN — SODIUM CHLORIDE, PRESERVATIVE FREE 10 ML: 5 INJECTION INTRAVENOUS at 22:01

## 2022-01-01 RX ADMIN — PANTOPRAZOLE SODIUM 40 MG: 40 TABLET, DELAYED RELEASE ORAL at 05:12

## 2022-01-01 RX ADMIN — INSULIN LISPRO 6 UNITS: 100 INJECTION, SOLUTION INTRAVENOUS; SUBCUTANEOUS at 12:18

## 2022-01-01 RX ADMIN — INSULIN LISPRO 4 UNITS: 100 INJECTION, SOLUTION INTRAVENOUS; SUBCUTANEOUS at 17:23

## 2022-01-01 RX ADMIN — OXYCODONE HYDROCHLORIDE AND ACETAMINOPHEN 1 TABLET: 5; 325 TABLET ORAL at 02:00

## 2022-01-01 RX ADMIN — INSULIN LISPRO 3 UNITS: 100 INJECTION, SOLUTION INTRAVENOUS; SUBCUTANEOUS at 00:26

## 2022-01-01 RX ADMIN — IPRATROPIUM BROMIDE AND ALBUTEROL SULFATE 1 AMPULE: 2.5; .5 SOLUTION RESPIRATORY (INHALATION) at 02:49

## 2022-01-01 RX ADMIN — INSULIN LISPRO 2 UNITS: 100 INJECTION, SOLUTION INTRAVENOUS; SUBCUTANEOUS at 16:44

## 2022-01-01 RX ADMIN — COLLAGENASE SANTYL: 250 OINTMENT TOPICAL at 08:26

## 2022-01-01 RX ADMIN — METOPROLOL SUCCINATE 25 MG: 25 TABLET, FILM COATED, EXTENDED RELEASE ORAL at 09:44

## 2022-01-01 RX ADMIN — STANDARDIZED SENNA CONCENTRATE AND DOCUSATE SODIUM 2 TABLET: 8.6; 5 TABLET ORAL at 08:13

## 2022-01-01 RX ADMIN — Medication: at 01:27

## 2022-01-01 RX ADMIN — Medication 2 PUFF: at 19:25

## 2022-01-01 RX ADMIN — EPOETIN ALFA-EPBX 3000 UNITS: 3000 INJECTION, SOLUTION INTRAVENOUS; SUBCUTANEOUS at 12:27

## 2022-01-01 RX ADMIN — SODIUM CHLORIDE 30 MG/ML INHALATION SOLUTION 4 ML: 30 SOLUTION INHALANT at 18:52

## 2022-01-01 RX ADMIN — APIXABAN 2.5 MG: 5 TABLET, FILM COATED ORAL at 07:53

## 2022-01-01 RX ADMIN — INSULIN LISPRO 3 UNITS: 100 INJECTION, SOLUTION INTRAVENOUS; SUBCUTANEOUS at 16:26

## 2022-01-01 RX ADMIN — INSULIN LISPRO 4 UNITS: 100 INJECTION, SOLUTION INTRAVENOUS; SUBCUTANEOUS at 11:46

## 2022-01-01 RX ADMIN — MIDODRINE HYDROCHLORIDE 10 MG: 10 TABLET ORAL at 12:02

## 2022-01-01 RX ADMIN — APIXABAN 2.5 MG: 5 TABLET, FILM COATED ORAL at 20:19

## 2022-01-01 RX ADMIN — INSULIN LISPRO 3 UNITS: 100 INJECTION, SOLUTION INTRAVENOUS; SUBCUTANEOUS at 17:00

## 2022-01-01 RX ADMIN — INSULIN LISPRO 2 UNITS: 100 INJECTION, SOLUTION INTRAVENOUS; SUBCUTANEOUS at 22:00

## 2022-01-01 RX ADMIN — DOCUSATE SODIUM 100 MG: 100 CAPSULE, LIQUID FILLED ORAL at 21:17

## 2022-01-01 RX ADMIN — DOBUTAMINE 5 MCG/KG/MIN: 12.5 INJECTION, SOLUTION, CONCENTRATE INTRAVENOUS at 07:31

## 2022-01-01 RX ADMIN — IPRATROPIUM BROMIDE AND ALBUTEROL SULFATE 1 AMPULE: .5; 2.5 SOLUTION RESPIRATORY (INHALATION) at 18:32

## 2022-01-01 RX ADMIN — TIGECYCLINE 50 MG: 50 INJECTION, POWDER, LYOPHILIZED, FOR SOLUTION INTRAVENOUS at 11:55

## 2022-01-01 RX ADMIN — MIDODRINE HYDROCHLORIDE 10 MG: 10 TABLET ORAL at 16:58

## 2022-01-01 RX ADMIN — INSULIN LISPRO 2 UNITS: 100 INJECTION, SOLUTION INTRAVENOUS; SUBCUTANEOUS at 08:40

## 2022-01-01 RX ADMIN — CEFTAZIDIME 1000 MG: 1 INJECTION, POWDER, FOR SOLUTION INTRAMUSCULAR; INTRAVENOUS at 06:06

## 2022-01-01 RX ADMIN — INSULIN GLARGINE 30 UNITS: 100 INJECTION, SOLUTION SUBCUTANEOUS at 20:25

## 2022-01-01 RX ADMIN — CALCIUM GLUCONATE 1000 MG: 98 INJECTION, SOLUTION INTRAVENOUS at 00:14

## 2022-01-01 RX ADMIN — IPRATROPIUM BROMIDE AND ALBUTEROL SULFATE 1 AMPULE: 2.5; .5 SOLUTION RESPIRATORY (INHALATION) at 00:02

## 2022-01-01 RX ADMIN — METOPROLOL SUCCINATE 25 MG: 25 TABLET, FILM COATED, EXTENDED RELEASE ORAL at 08:28

## 2022-01-01 RX ADMIN — HYDROMORPHONE HYDROCHLORIDE 0.5 MG: 1 INJECTION, SOLUTION INTRAMUSCULAR; INTRAVENOUS; SUBCUTANEOUS at 21:00

## 2022-01-01 RX ADMIN — SENNOSIDES 8.6 MG: 8.6 TABLET, COATED ORAL at 19:58

## 2022-01-01 RX ADMIN — FENTANYL CITRATE 50 MCG: 50 INJECTION INTRAMUSCULAR; INTRAVENOUS at 09:02

## 2022-01-01 RX ADMIN — FENTANYL CITRATE 50 MCG: 50 INJECTION INTRAMUSCULAR; INTRAVENOUS at 02:30

## 2022-01-01 RX ADMIN — CARBOXYMETHYLCELLULOSE SODIUM 1 DROP: 10 GEL OPHTHALMIC at 13:03

## 2022-01-01 RX ADMIN — METOPROLOL SUCCINATE 25 MG: 25 TABLET, FILM COATED, EXTENDED RELEASE ORAL at 21:12

## 2022-01-01 RX ADMIN — DOBUTAMINE 2.5 MCG/KG/MIN: 12.5 INJECTION, SOLUTION, CONCENTRATE INTRAVENOUS at 20:33

## 2022-01-01 RX ADMIN — MONTELUKAST SODIUM 10 MG: 10 TABLET, COATED ORAL at 08:28

## 2022-01-01 RX ADMIN — SODIUM PHOSPHATE, MONOBASIC, MONOHYDRATE 6 MMOL: 276; 142 INJECTION, SOLUTION INTRAVENOUS at 12:46

## 2022-01-01 RX ADMIN — VASOPRESSIN 0.04 UNITS/MIN: 20 INJECTION PARENTERAL at 05:05

## 2022-01-01 RX ADMIN — IPRATROPIUM BROMIDE AND ALBUTEROL SULFATE 1 AMPULE: .5; 2.5 SOLUTION RESPIRATORY (INHALATION) at 19:06

## 2022-01-01 RX ADMIN — Medication: at 13:18

## 2022-01-01 RX ADMIN — CALCIUM GLUCONATE 1000 MG: 98 INJECTION, SOLUTION INTRAVENOUS at 09:26

## 2022-01-01 RX ADMIN — OXYCODONE HYDROCHLORIDE AND ACETAMINOPHEN 1 TABLET: 7.5; 325 TABLET ORAL at 02:00

## 2022-01-01 RX ADMIN — SODIUM PHOSPHATE, MONOBASIC, MONOHYDRATE 6 MMOL: 276; 142 INJECTION, SOLUTION INTRAVENOUS at 00:40

## 2022-01-01 RX ADMIN — CALCIUM GLUCONATE 1000 MG: 98 INJECTION, SOLUTION INTRAVENOUS at 16:21

## 2022-01-01 RX ADMIN — DEXTROSE MONOHYDRATE 125 ML: 100 INJECTION, SOLUTION INTRAVENOUS at 04:39

## 2022-01-01 RX ADMIN — OXYCODONE HYDROCHLORIDE AND ACETAMINOPHEN 1 TABLET: 7.5; 325 TABLET ORAL at 16:57

## 2022-01-01 RX ADMIN — POTASSIUM BICARBONATE 40 MEQ: 782 TABLET, EFFERVESCENT ORAL at 19:03

## 2022-01-01 RX ADMIN — DOCUSATE SODIUM 100 MG: 100 CAPSULE, LIQUID FILLED ORAL at 21:24

## 2022-01-01 RX ADMIN — PANTOPRAZOLE SODIUM 40 MG: 40 INJECTION, POWDER, FOR SOLUTION INTRAVENOUS at 08:25

## 2022-01-01 RX ADMIN — STANDARDIZED SENNA CONCENTRATE AND DOCUSATE SODIUM 2 TABLET: 8.6; 5 TABLET ORAL at 08:08

## 2022-01-01 RX ADMIN — MAGNESIUM GLUCONATE 500 MG ORAL TABLET 800 MG: 500 TABLET ORAL at 21:25

## 2022-01-01 RX ADMIN — Medication: at 18:05

## 2022-01-01 RX ADMIN — TOBRAMYCIN 300 MG: 300 SOLUTION RESPIRATORY (INHALATION) at 07:40

## 2022-01-01 RX ADMIN — VASOPRESSIN 0.03 UNITS/MIN: 20 INJECTION PARENTERAL at 10:50

## 2022-01-01 RX ADMIN — IPRATROPIUM BROMIDE AND ALBUTEROL SULFATE 1 AMPULE: .5; 2.5 SOLUTION RESPIRATORY (INHALATION) at 11:20

## 2022-01-01 RX ADMIN — INSULIN GLARGINE 33 UNITS: 100 INJECTION, SOLUTION SUBCUTANEOUS at 11:21

## 2022-01-01 RX ADMIN — INSULIN LISPRO 9 UNITS: 100 INJECTION, SOLUTION INTRAVENOUS; SUBCUTANEOUS at 12:02

## 2022-01-01 RX ADMIN — INSULIN LISPRO 4 UNITS: 100 INJECTION, SOLUTION INTRAVENOUS; SUBCUTANEOUS at 16:20

## 2022-01-01 RX ADMIN — FENTANYL CITRATE 50 MCG: 50 INJECTION INTRAMUSCULAR; INTRAVENOUS at 21:00

## 2022-01-01 RX ADMIN — TIGECYCLINE 50 MG: 50 INJECTION, POWDER, LYOPHILIZED, FOR SOLUTION INTRAVENOUS at 12:11

## 2022-01-01 RX ADMIN — STANDARDIZED SENNA CONCENTRATE AND DOCUSATE SODIUM 2 TABLET: 8.6; 5 TABLET ORAL at 19:58

## 2022-01-01 RX ADMIN — NOREPINEPHRINE BITARTRATE 17 MCG/MIN: 1 INJECTION INTRAVENOUS at 11:09

## 2022-01-01 RX ADMIN — Medication: at 17:17

## 2022-01-01 RX ADMIN — CEFTAZIDIME 1000 MG: 1 INJECTION, POWDER, FOR SOLUTION INTRAMUSCULAR; INTRAVENOUS at 00:16

## 2022-01-01 RX ADMIN — SODIUM PHOSPHATE, MONOBASIC, MONOHYDRATE 6 MMOL: 276; 142 INJECTION, SOLUTION INTRAVENOUS at 18:05

## 2022-01-01 RX ADMIN — SODIUM PHOSPHATE, MONOBASIC, MONOHYDRATE 6 MMOL: 276; 142 INJECTION, SOLUTION INTRAVENOUS at 00:23

## 2022-01-01 RX ADMIN — OXYCODONE HYDROCHLORIDE AND ACETAMINOPHEN 1 TABLET: 5; 325 TABLET ORAL at 05:04

## 2022-01-01 RX ADMIN — SODIUM PHOSPHATE, MONOBASIC, MONOHYDRATE 6 MMOL: 276; 142 INJECTION, SOLUTION INTRAVENOUS at 01:25

## 2022-01-01 RX ADMIN — FENTANYL CITRATE 50 MCG: 50 INJECTION INTRAMUSCULAR; INTRAVENOUS at 05:00

## 2022-01-01 RX ADMIN — NOREPINEPHRINE BITARTRATE 5 MCG/MIN: 1 INJECTION INTRAVENOUS at 02:20

## 2022-01-01 RX ADMIN — HYDROCORTISONE SODIUM SUCCINATE 100 MG: 100 INJECTION, POWDER, FOR SOLUTION INTRAMUSCULAR; INTRAVENOUS at 03:20

## 2022-01-01 RX ADMIN — FUROSEMIDE 80 MG: 10 INJECTION, SOLUTION INTRAMUSCULAR; INTRAVENOUS at 15:40

## 2022-01-01 RX ADMIN — CHLORHEXIDINE GLUCONATE 0.12% ORAL RINSE 15 ML: 1.2 LIQUID ORAL at 07:54

## 2022-01-01 RX ADMIN — EPINEPHRINE 1 MG: 0.1 INJECTION, SOLUTION ENDOTRACHEAL; INTRACARDIAC; INTRAVENOUS at 05:15

## 2022-01-01 RX ADMIN — Medication: at 22:53

## 2022-01-01 RX ADMIN — ASPIRIN 81 MG: 81 TABLET, CHEWABLE ORAL at 20:15

## 2022-01-01 RX ADMIN — NOREPINEPHRINE BITARTRATE 5 MCG/MIN: 1 INJECTION INTRAVENOUS at 12:06

## 2022-01-01 RX ADMIN — INSULIN LISPRO 3 UNITS: 100 INJECTION, SOLUTION INTRAVENOUS; SUBCUTANEOUS at 01:32

## 2022-01-01 RX ADMIN — FENTANYL CITRATE 50 MCG: 50 INJECTION INTRAMUSCULAR; INTRAVENOUS at 03:41

## 2022-01-01 RX ADMIN — HYDROCORTISONE SODIUM SUCCINATE 100 MG: 100 INJECTION, POWDER, FOR SOLUTION INTRAMUSCULAR; INTRAVENOUS at 04:08

## 2022-01-01 RX ADMIN — TOBRAMYCIN 300 MG: 300 SOLUTION RESPIRATORY (INHALATION) at 19:07

## 2022-01-01 RX ADMIN — CALCIUM GLUCONATE 1000 MG: 98 INJECTION, SOLUTION INTRAVENOUS at 10:25

## 2022-01-01 RX ADMIN — MAGNESIUM GLUCONATE 500 MG ORAL TABLET 800 MG: 500 TABLET ORAL at 20:13

## 2022-01-01 RX ADMIN — ANORECTAL OINTMENT: 15.7; .44; 24; 20.6 OINTMENT TOPICAL at 11:23

## 2022-01-01 RX ADMIN — Medication 150 MG: at 11:23

## 2022-01-01 RX ADMIN — Medication: at 13:14

## 2022-01-01 RX ADMIN — IPRATROPIUM BROMIDE AND ALBUTEROL SULFATE 1 AMPULE: .5; 2.5 SOLUTION RESPIRATORY (INHALATION) at 15:09

## 2022-01-01 RX ADMIN — WHITE PETROLATUM 57.7 %-MINERAL OIL 31.9 % EYE OINTMENT: at 05:50

## 2022-01-01 RX ADMIN — ONDANSETRON HYDROCHLORIDE 4 MG: 2 INJECTION, SOLUTION INTRAMUSCULAR; INTRAVENOUS at 04:46

## 2022-01-01 RX ADMIN — DOCUSATE SODIUM 100 MG: 100 CAPSULE, LIQUID FILLED ORAL at 12:07

## 2022-01-01 RX ADMIN — FENTANYL CITRATE 50 MCG: 50 INJECTION INTRAMUSCULAR; INTRAVENOUS at 06:00

## 2022-01-01 RX ADMIN — CALCIUM GLUCONATE 1000 MG: 98 INJECTION, SOLUTION INTRAVENOUS at 05:41

## 2022-01-01 RX ADMIN — DOCUSATE SODIUM 100 MG: 100 CAPSULE, LIQUID FILLED ORAL at 20:50

## 2022-01-01 RX ADMIN — IPRATROPIUM BROMIDE AND ALBUTEROL SULFATE 1 AMPULE: .5; 2.5 SOLUTION RESPIRATORY (INHALATION) at 19:14

## 2022-01-01 RX ADMIN — INSULIN GLARGINE 45 UNITS: 100 INJECTION, SOLUTION SUBCUTANEOUS at 08:41

## 2022-01-01 RX ADMIN — CLINDAMYCIN PHOSPHATE 900 MG: 900 INJECTION, SOLUTION INTRAVENOUS at 02:49

## 2022-01-01 RX ADMIN — PANTOPRAZOLE SODIUM 40 MG: 40 TABLET, DELAYED RELEASE ORAL at 06:02

## 2022-01-01 RX ADMIN — Medication: at 18:59

## 2022-01-01 RX ADMIN — CALCIUM GLUCONATE 1000 MG: 98 INJECTION, SOLUTION INTRAVENOUS at 05:09

## 2022-01-01 RX ADMIN — IPRATROPIUM BROMIDE AND ALBUTEROL SULFATE 1 AMPULE: .5; 2.5 SOLUTION RESPIRATORY (INHALATION) at 19:27

## 2022-01-01 RX ADMIN — HYDROCORTISONE SODIUM SUCCINATE 100 MG: 100 INJECTION, POWDER, FOR SOLUTION INTRAMUSCULAR; INTRAVENOUS at 10:28

## 2022-01-01 RX ADMIN — DOCUSATE SODIUM 100 MG: 100 CAPSULE, LIQUID FILLED ORAL at 20:14

## 2022-01-01 RX ADMIN — WHITE PETROLATUM 57.7 %-MINERAL OIL 31.9 % EYE OINTMENT: at 23:10

## 2022-01-01 RX ADMIN — FERROUS SULFATE TAB 325 MG (65 MG ELEMENTAL FE) 325 MG: 325 (65 FE) TAB at 09:09

## 2022-01-01 RX ADMIN — SODIUM PHOSPHATE, MONOBASIC, MONOHYDRATE 6 MMOL: 276; 142 INJECTION, SOLUTION INTRAVENOUS at 12:03

## 2022-01-01 RX ADMIN — VANCOMYCIN HYDROCHLORIDE 750 MG: 10 INJECTION, POWDER, LYOPHILIZED, FOR SOLUTION INTRAVENOUS at 04:17

## 2022-01-01 RX ADMIN — MIDODRINE HYDROCHLORIDE 10 MG: 10 TABLET ORAL at 12:54

## 2022-01-01 RX ADMIN — IPRATROPIUM BROMIDE AND ALBUTEROL SULFATE 1 AMPULE: 2.5; .5 SOLUTION RESPIRATORY (INHALATION) at 10:52

## 2022-01-01 RX ADMIN — WHITE PETROLATUM 57.7 %-MINERAL OIL 31.9 % EYE OINTMENT: at 23:29

## 2022-01-01 RX ADMIN — IPRATROPIUM BROMIDE AND ALBUTEROL SULFATE 1 AMPULE: .5; 2.5 SOLUTION RESPIRATORY (INHALATION) at 23:03

## 2022-01-01 RX ADMIN — WHITE PETROLATUM 57.7 %-MINERAL OIL 31.9 % EYE OINTMENT: at 19:59

## 2022-01-01 RX ADMIN — IPRATROPIUM BROMIDE AND ALBUTEROL SULFATE 1 AMPULE: 2.5; .5 SOLUTION RESPIRATORY (INHALATION) at 19:20

## 2022-01-01 RX ADMIN — EPOETIN ALFA-EPBX 3000 UNITS: 3000 INJECTION, SOLUTION INTRAVENOUS; SUBCUTANEOUS at 13:15

## 2022-01-01 RX ADMIN — PANTOPRAZOLE SODIUM 40 MG: 40 INJECTION, POWDER, FOR SOLUTION INTRAVENOUS at 07:26

## 2022-01-01 RX ADMIN — VASOPRESSIN 0.03 UNITS/MIN: 20 INJECTION INTRAVENOUS at 02:27

## 2022-01-01 RX ADMIN — CARBOXYMETHYLCELLULOSE SODIUM 1 DROP: 10 GEL OPHTHALMIC at 19:48

## 2022-01-01 RX ADMIN — SODIUM PHOSPHATE, MONOBASIC, MONOHYDRATE 12 MMOL: 276; 142 INJECTION, SOLUTION INTRAVENOUS at 15:30

## 2022-01-01 RX ADMIN — CLINDAMYCIN IN 5 PERCENT DEXTROSE 900 MG: 18 INJECTION, SOLUTION INTRAVENOUS at 09:52

## 2022-01-01 RX ADMIN — Medication 2 PUFF: at 20:28

## 2022-01-01 RX ADMIN — SODIUM PHOSPHATE, MONOBASIC, MONOHYDRATE 6 MMOL: 276; 142 INJECTION, SOLUTION INTRAVENOUS at 21:52

## 2022-01-01 RX ADMIN — PANTOPRAZOLE SODIUM 40 MG: 40 INJECTION, POWDER, FOR SOLUTION INTRAVENOUS at 08:55

## 2022-01-01 RX ADMIN — NOREPINEPHRINE BITARTRATE 5 MCG/MIN: 1 INJECTION INTRAVENOUS at 01:34

## 2022-01-01 RX ADMIN — INSULIN GLARGINE 45 UNITS: 100 INJECTION, SOLUTION SUBCUTANEOUS at 21:15

## 2022-01-01 RX ADMIN — WHITE PETROLATUM 57.7 %-MINERAL OIL 31.9 % EYE OINTMENT: at 15:23

## 2022-01-01 RX ADMIN — Medication: at 14:06

## 2022-01-01 RX ADMIN — SODIUM CHLORIDE, PRESERVATIVE FREE 10 ML: 5 INJECTION INTRAVENOUS at 21:33

## 2022-01-01 RX ADMIN — STANDARDIZED SENNA CONCENTRATE AND DOCUSATE SODIUM 2 TABLET: 8.6; 5 TABLET ORAL at 20:22

## 2022-01-01 RX ADMIN — METOPROLOL SUCCINATE 25 MG: 25 TABLET, FILM COATED, EXTENDED RELEASE ORAL at 09:09

## 2022-01-01 RX ADMIN — CALCIUM GLUCONATE 1000 MG: 98 INJECTION, SOLUTION INTRAVENOUS at 09:56

## 2022-01-01 RX ADMIN — ANORECTAL OINTMENT: 15.7; .44; 24; 20.6 OINTMENT TOPICAL at 07:33

## 2022-01-01 RX ADMIN — CEFTAZIDIME 1000 MG: 1 INJECTION, POWDER, FOR SOLUTION INTRAMUSCULAR; INTRAVENOUS at 00:23

## 2022-01-01 RX ADMIN — SODIUM PHOSPHATE, MONOBASIC, MONOHYDRATE 6 MMOL: 276; 142 INJECTION, SOLUTION INTRAVENOUS at 17:13

## 2022-01-01 RX ADMIN — SODIUM BICARBONATE 50 MEQ: 84 INJECTION INTRAVENOUS at 15:39

## 2022-01-01 RX ADMIN — INSULIN LISPRO 6 UNITS: 100 INJECTION, SOLUTION INTRAVENOUS; SUBCUTANEOUS at 08:00

## 2022-01-01 RX ADMIN — NOREPINEPHRINE BITARTRATE 4 MCG/MIN: 1 INJECTION INTRAVENOUS at 05:19

## 2022-01-01 RX ADMIN — IPRATROPIUM BROMIDE AND ALBUTEROL SULFATE 1 AMPULE: 2.5; .5 SOLUTION RESPIRATORY (INHALATION) at 19:24

## 2022-01-01 RX ADMIN — NOREPINEPHRINE BITARTRATE 15 MCG/MIN: 1 INJECTION INTRAVENOUS at 15:56

## 2022-01-01 RX ADMIN — METOPROLOL SUCCINATE 25 MG: 25 TABLET, FILM COATED, EXTENDED RELEASE ORAL at 08:39

## 2022-01-01 RX ADMIN — IPRATROPIUM BROMIDE AND ALBUTEROL SULFATE 1 AMPULE: .5; 2.5 SOLUTION RESPIRATORY (INHALATION) at 02:47

## 2022-01-01 RX ADMIN — Medication: at 06:20

## 2022-01-01 RX ADMIN — SODIUM PHOSPHATE, MONOBASIC, MONOHYDRATE 6 MMOL: 276; 142 INJECTION, SOLUTION INTRAVENOUS at 00:13

## 2022-01-01 RX ADMIN — ANORECTAL OINTMENT: 15.7; .44; 24; 20.6 OINTMENT TOPICAL at 08:00

## 2022-01-01 RX ADMIN — Medication 800 MG: at 20:45

## 2022-01-01 RX ADMIN — CEFTAZIDIME 1000 MG: 1 INJECTION, POWDER, FOR SOLUTION INTRAMUSCULAR; INTRAVENOUS at 09:33

## 2022-01-01 RX ADMIN — PANTOPRAZOLE SODIUM 40 MG: 40 INJECTION, POWDER, FOR SOLUTION INTRAVENOUS at 07:52

## 2022-01-01 RX ADMIN — CARBOXYMETHYLCELLULOSE SODIUM 1 DROP: 10 GEL OPHTHALMIC at 01:37

## 2022-01-01 RX ADMIN — PANTOPRAZOLE SODIUM 40 MG: 40 TABLET, DELAYED RELEASE ORAL at 08:24

## 2022-01-01 RX ADMIN — METOPROLOL SUCCINATE 25 MG: 25 TABLET, FILM COATED, EXTENDED RELEASE ORAL at 08:32

## 2022-01-01 RX ADMIN — APIXABAN 5 MG: 5 TABLET, FILM COATED ORAL at 21:11

## 2022-01-01 RX ADMIN — Medication 2 PUFF: at 19:11

## 2022-01-01 RX ADMIN — DOCUSATE SODIUM 100 MG: 100 CAPSULE, LIQUID FILLED ORAL at 08:42

## 2022-01-01 RX ADMIN — APIXABAN 5 MG: 5 TABLET, FILM COATED ORAL at 08:43

## 2022-01-01 RX ADMIN — METOPROLOL SUCCINATE 25 MG: 25 TABLET, FILM COATED, EXTENDED RELEASE ORAL at 21:07

## 2022-01-01 RX ADMIN — INSULIN LISPRO 3 UNITS: 100 INJECTION, SOLUTION INTRAVENOUS; SUBCUTANEOUS at 04:17

## 2022-01-01 RX ADMIN — SODIUM CHLORIDE, PRESERVATIVE FREE 10 ML: 5 INJECTION INTRAVENOUS at 21:07

## 2022-01-01 RX ADMIN — IPRATROPIUM BROMIDE AND ALBUTEROL SULFATE 1 AMPULE: .5; 2.5 SOLUTION RESPIRATORY (INHALATION) at 15:31

## 2022-01-01 RX ADMIN — VASOPRESSIN 0.04 UNITS/MIN: 20 INJECTION PARENTERAL at 20:28

## 2022-01-01 RX ADMIN — ANORECTAL OINTMENT: 15.7; .44; 24; 20.6 OINTMENT TOPICAL at 21:08

## 2022-01-01 RX ADMIN — APIXABAN 2.5 MG: 5 TABLET, FILM COATED ORAL at 08:23

## 2022-01-01 RX ADMIN — TRAZODONE HYDROCHLORIDE 50 MG: 50 TABLET ORAL at 20:15

## 2022-01-01 RX ADMIN — INSULIN LISPRO 3 UNITS: 100 INJECTION, SOLUTION INTRAVENOUS; SUBCUTANEOUS at 17:07

## 2022-01-01 RX ADMIN — MONTELUKAST SODIUM 10 MG: 10 TABLET, COATED ORAL at 09:45

## 2022-01-01 RX ADMIN — ANORECTAL OINTMENT: 15.7; .44; 24; 20.6 OINTMENT TOPICAL at 08:30

## 2022-01-01 RX ADMIN — CETIRIZINE HYDROCHLORIDE 10 MG: 10 TABLET ORAL at 08:28

## 2022-01-01 RX ADMIN — APIXABAN 2.5 MG: 5 TABLET, FILM COATED ORAL at 08:54

## 2022-01-01 RX ADMIN — DEXTROSE MONOHYDRATE 125 ML: 100 INJECTION, SOLUTION INTRAVENOUS at 11:30

## 2022-01-01 RX ADMIN — SENNOSIDES 17.2 MG: 8.6 TABLET, COATED ORAL at 20:27

## 2022-01-01 RX ADMIN — IPRATROPIUM BROMIDE AND ALBUTEROL SULFATE 1 AMPULE: .5; 2.5 SOLUTION RESPIRATORY (INHALATION) at 12:08

## 2022-01-01 RX ADMIN — STANDARDIZED SENNA CONCENTRATE AND DOCUSATE SODIUM 2 TABLET: 8.6; 5 TABLET ORAL at 20:01

## 2022-01-01 RX ADMIN — APIXABAN 5 MG: 5 TABLET, FILM COATED ORAL at 08:32

## 2022-01-01 RX ADMIN — WHITE PETROLATUM 57.7 %-MINERAL OIL 31.9 % EYE OINTMENT: at 15:02

## 2022-01-01 RX ADMIN — PANTOPRAZOLE SODIUM 40 MG: 40 INJECTION, POWDER, FOR SOLUTION INTRAVENOUS at 07:49

## 2022-01-01 RX ADMIN — OXYCODONE HYDROCHLORIDE AND ACETAMINOPHEN 1 TABLET: 7.5; 325 TABLET ORAL at 22:00

## 2022-01-01 RX ADMIN — OXYCODONE HYDROCHLORIDE AND ACETAMINOPHEN 1 TABLET: 7.5; 325 TABLET ORAL at 16:11

## 2022-01-01 RX ADMIN — OXYCODONE AND ACETAMINOPHEN 1 TABLET: 10; 325 TABLET ORAL at 16:18

## 2022-01-01 RX ADMIN — NOREPINEPHRINE BITARTRATE 16 MCG/MIN: 1 INJECTION INTRAVENOUS at 16:10

## 2022-01-01 RX ADMIN — VASOPRESSIN 0.03 UNITS/MIN: 20 INJECTION PARENTERAL at 13:54

## 2022-01-01 RX ADMIN — NOREPINEPHRINE BITARTRATE 50 MCG/MIN: 1 INJECTION INTRAVENOUS at 02:28

## 2022-01-01 RX ADMIN — HEPARIN SODIUM 4000 UNITS: 1000 INJECTION INTRAVENOUS; SUBCUTANEOUS at 03:40

## 2022-01-01 RX ADMIN — DOCUSATE SODIUM 100 MG: 100 CAPSULE, LIQUID FILLED ORAL at 20:15

## 2022-01-01 RX ADMIN — SODIUM PHOSPHATE, MONOBASIC, MONOHYDRATE 6 MMOL: 276; 142 INJECTION, SOLUTION INTRAVENOUS at 09:59

## 2022-01-01 RX ADMIN — PANTOPRAZOLE SODIUM 40 MG: 40 INJECTION, POWDER, FOR SOLUTION INTRAVENOUS at 07:44

## 2022-01-01 RX ADMIN — FENTANYL CITRATE 50 MCG: 50 INJECTION, SOLUTION INTRAMUSCULAR; INTRAVENOUS at 11:48

## 2022-01-01 RX ADMIN — ASPIRIN 81 MG 81 MG: 81 TABLET ORAL at 20:22

## 2022-01-01 RX ADMIN — POLYETHYLENE GLYCOL (3350) 17 G: 17 POWDER, FOR SOLUTION ORAL at 08:16

## 2022-01-01 RX ADMIN — COLLAGENASE SANTYL: 250 OINTMENT TOPICAL at 10:58

## 2022-01-01 RX ADMIN — CARBOXYMETHYLCELLULOSE SODIUM 1 DROP: 10 GEL OPHTHALMIC at 16:57

## 2022-01-01 RX ADMIN — VANCOMYCIN HYDROCHLORIDE 1000 MG: 1 INJECTION, POWDER, LYOPHILIZED, FOR SOLUTION INTRAVENOUS at 20:23

## 2022-01-01 RX ADMIN — IPRATROPIUM BROMIDE AND ALBUTEROL SULFATE 1 AMPULE: 2.5; .5 SOLUTION RESPIRATORY (INHALATION) at 23:39

## 2022-01-01 RX ADMIN — PANTOPRAZOLE SODIUM 40 MG: 40 INJECTION, POWDER, FOR SOLUTION INTRAVENOUS at 07:33

## 2022-01-01 RX ADMIN — SODIUM PHOSPHATE, MONOBASIC, MONOHYDRATE 6 MMOL: 276; 142 INJECTION, SOLUTION INTRAVENOUS at 10:29

## 2022-01-01 RX ADMIN — ANORECTAL OINTMENT: 15.7; .44; 24; 20.6 OINTMENT TOPICAL at 08:55

## 2022-01-01 RX ADMIN — PANTOPRAZOLE SODIUM 40 MG: 40 INJECTION, POWDER, FOR SOLUTION INTRAVENOUS at 08:39

## 2022-01-01 RX ADMIN — INSULIN LISPRO 3 UNITS: 100 INJECTION, SOLUTION INTRAVENOUS; SUBCUTANEOUS at 20:30

## 2022-01-01 RX ADMIN — SODIUM CHLORIDE, PRESERVATIVE FREE 10 ML: 5 INJECTION INTRAVENOUS at 22:52

## 2022-01-01 RX ADMIN — NOREPINEPHRINE BITARTRATE 12 MCG/MIN: 1 INJECTION INTRAVENOUS at 04:19

## 2022-01-01 RX ADMIN — INSULIN LISPRO 3 UNITS: 100 INJECTION, SOLUTION INTRAVENOUS; SUBCUTANEOUS at 21:13

## 2022-01-01 RX ADMIN — METOPROLOL SUCCINATE 25 MG: 25 TABLET, EXTENDED RELEASE ORAL at 16:42

## 2022-01-01 RX ADMIN — CALCIUM GLUCONATE 1000 MG: 98 INJECTION, SOLUTION INTRAVENOUS at 04:14

## 2022-01-01 RX ADMIN — APIXABAN 5 MG: 5 TABLET, FILM COATED ORAL at 21:16

## 2022-01-01 RX ADMIN — INSULIN LISPRO 4 UNITS: 100 INJECTION, SOLUTION INTRAVENOUS; SUBCUTANEOUS at 22:14

## 2022-01-01 RX ADMIN — INSULIN LISPRO 6 UNITS: 100 INJECTION, SOLUTION INTRAVENOUS; SUBCUTANEOUS at 16:57

## 2022-01-01 RX ADMIN — FUROSEMIDE 20 MG/HR: 10 INJECTION, SOLUTION INTRAMUSCULAR; INTRAVENOUS at 11:30

## 2022-01-01 RX ADMIN — FUROSEMIDE 80 MG: 10 INJECTION, SOLUTION INTRAMUSCULAR; INTRAVENOUS at 17:21

## 2022-01-01 RX ADMIN — DOCUSATE SODIUM 100 MG: 100 CAPSULE, LIQUID FILLED ORAL at 08:34

## 2022-01-01 RX ADMIN — DOCUSATE SODIUM 100 MG: 100 CAPSULE, LIQUID FILLED ORAL at 08:24

## 2022-01-01 RX ADMIN — OXYCODONE AND ACETAMINOPHEN 1 TABLET: 10; 325 TABLET ORAL at 15:14

## 2022-01-01 RX ADMIN — SODIUM CHLORIDE, PRESERVATIVE FREE 10 ML: 5 INJECTION INTRAVENOUS at 22:46

## 2022-01-01 RX ADMIN — VASOPRESSIN 0.04 UNITS/MIN: 20 INJECTION PARENTERAL at 20:47

## 2022-01-01 RX ADMIN — POLYETHYLENE GLYCOL (3350) 17 G: 17 POWDER, FOR SOLUTION ORAL at 08:47

## 2022-01-01 RX ADMIN — SPIRONOLACTONE 25 MG: 25 TABLET ORAL at 16:20

## 2022-01-01 RX ADMIN — PANTOPRAZOLE SODIUM 40 MG: 40 INJECTION, POWDER, FOR SOLUTION INTRAVENOUS at 08:34

## 2022-01-01 RX ADMIN — SODIUM PHOSPHATE, MONOBASIC, MONOHYDRATE 6 MMOL: 276; 142 INJECTION, SOLUTION INTRAVENOUS at 23:30

## 2022-01-01 RX ADMIN — ASPIRIN 81 MG 81 MG: 81 TABLET ORAL at 21:11

## 2022-01-01 RX ADMIN — KETAMINE HYDROCHLORIDE 0.8 MG/KG/HR: 50 INJECTION, SOLUTION INTRAMUSCULAR; INTRAVENOUS at 22:32

## 2022-01-01 RX ADMIN — MIDODRINE HYDROCHLORIDE 15 MG: 5 TABLET ORAL at 11:49

## 2022-01-01 RX ADMIN — DOCUSATE SODIUM 100 MG: 100 CAPSULE, LIQUID FILLED ORAL at 20:13

## 2022-01-01 RX ADMIN — ANORECTAL OINTMENT: 15.7; .44; 24; 20.6 OINTMENT TOPICAL at 09:20

## 2022-01-01 RX ADMIN — SODIUM BICARBONATE 50 MEQ: 84 INJECTION, SOLUTION INTRAVENOUS at 05:15

## 2022-01-01 RX ADMIN — WATER 20 ML: 1 INJECTION INTRAMUSCULAR; INTRAVENOUS; SUBCUTANEOUS at 06:20

## 2022-01-01 RX ADMIN — TOBRAMYCIN 300 MG: 300 SOLUTION RESPIRATORY (INHALATION) at 07:56

## 2022-01-01 RX ADMIN — FENTANYL CITRATE 50 MCG: 50 INJECTION INTRAMUSCULAR; INTRAVENOUS at 19:15

## 2022-01-01 RX ADMIN — IPRATROPIUM BROMIDE AND ALBUTEROL SULFATE 1 AMPULE: .5; 2.5 SOLUTION RESPIRATORY (INHALATION) at 22:52

## 2022-01-01 RX ADMIN — INSULIN GLARGINE 25 UNITS: 100 INJECTION, SOLUTION SUBCUTANEOUS at 20:36

## 2022-01-01 RX ADMIN — INSULIN GLARGINE 15 UNITS: 100 INJECTION, SOLUTION SUBCUTANEOUS at 21:13

## 2022-01-01 RX ADMIN — IPRATROPIUM BROMIDE AND ALBUTEROL SULFATE 1 AMPULE: 2.5; .5 SOLUTION RESPIRATORY (INHALATION) at 15:49

## 2022-01-01 RX ADMIN — HEPARIN SODIUM AND DEXTROSE 12 UNITS/KG/HR: 10000; 5 INJECTION INTRAVENOUS at 10:16

## 2022-01-01 RX ADMIN — IPRATROPIUM BROMIDE AND ALBUTEROL SULFATE 1 AMPULE: 2.5; .5 SOLUTION RESPIRATORY (INHALATION) at 07:16

## 2022-01-01 RX ADMIN — INSULIN LISPRO 3 UNITS: 100 INJECTION, SOLUTION INTRAVENOUS; SUBCUTANEOUS at 08:29

## 2022-01-01 RX ADMIN — IPRATROPIUM BROMIDE AND ALBUTEROL SULFATE 1 AMPULE: 2.5; .5 SOLUTION RESPIRATORY (INHALATION) at 16:24

## 2022-01-01 RX ADMIN — CALCIUM GLUCONATE 1000 MG: 98 INJECTION, SOLUTION INTRAVENOUS at 13:54

## 2022-01-01 RX ADMIN — NOREPINEPHRINE BITARTRATE 6 MCG/MIN: 1 INJECTION INTRAVENOUS at 04:19

## 2022-01-01 RX ADMIN — APIXABAN 2.5 MG: 5 TABLET, FILM COATED ORAL at 08:08

## 2022-01-01 RX ADMIN — INSULIN GLARGINE 35 UNITS: 100 INJECTION, SOLUTION SUBCUTANEOUS at 21:31

## 2022-01-01 RX ADMIN — Medication 2 PUFF: at 17:44

## 2022-01-01 RX ADMIN — CIPROFLOXACIN HYDROCHLORIDE 500 MG: 500 TABLET, FILM COATED ORAL at 20:15

## 2022-01-01 RX ADMIN — CALCIUM GLUCONATE 1000 MG: 98 INJECTION, SOLUTION INTRAVENOUS at 01:07

## 2022-01-01 RX ADMIN — Medication: at 21:52

## 2022-01-01 RX ADMIN — IPRATROPIUM BROMIDE AND ALBUTEROL SULFATE 1 AMPULE: 2.5; .5 SOLUTION RESPIRATORY (INHALATION) at 15:19

## 2022-01-01 RX ADMIN — MEROPENEM 1000 MG: 1 INJECTION, POWDER, FOR SOLUTION INTRAVENOUS at 06:26

## 2022-01-01 RX ADMIN — SODIUM PHOSPHATE, MONOBASIC, MONOHYDRATE 6 MMOL: 276; 142 INJECTION, SOLUTION INTRAVENOUS at 18:58

## 2022-01-01 RX ADMIN — SODIUM CHLORIDE 25 ML: 9 INJECTION, SOLUTION INTRAVENOUS at 11:35

## 2022-01-01 RX ADMIN — APIXABAN 5 MG: 5 TABLET, FILM COATED ORAL at 21:25

## 2022-01-01 RX ADMIN — CALCIUM GLUCONATE 1000 MG: 98 INJECTION, SOLUTION INTRAVENOUS at 18:03

## 2022-01-01 RX ADMIN — Medication 2 PUFF: at 20:10

## 2022-01-01 RX ADMIN — MAGNESIUM GLUCONATE 500 MG ORAL TABLET 800 MG: 500 TABLET ORAL at 20:27

## 2022-01-01 RX ADMIN — Medication: at 08:21

## 2022-01-01 RX ADMIN — VASOPRESSIN 0.04 UNITS/MIN: 20 INJECTION PARENTERAL at 07:26

## 2022-01-01 RX ADMIN — SODIUM CHLORIDE, PRESERVATIVE FREE 10 ML: 5 INJECTION INTRAVENOUS at 09:00

## 2022-01-01 RX ADMIN — FENTANYL CITRATE 50 MCG: 50 INJECTION INTRAMUSCULAR; INTRAVENOUS at 22:18

## 2022-01-01 RX ADMIN — MUPIROCIN: 20 OINTMENT TOPICAL at 21:12

## 2022-01-01 RX ADMIN — FENTANYL CITRATE 50 MCG: 50 INJECTION INTRAMUSCULAR; INTRAVENOUS at 21:51

## 2022-01-01 RX ADMIN — VASOPRESSIN 0.04 UNITS/MIN: 20 INJECTION PARENTERAL at 05:37

## 2022-01-01 RX ADMIN — SODIUM CHLORIDE, PRESERVATIVE FREE 10 ML: 5 INJECTION INTRAVENOUS at 08:57

## 2022-01-01 RX ADMIN — DOBUTAMINE HYDROCHLORIDE 5 MCG/KG/MIN: 200 INJECTION INTRAVENOUS at 09:28

## 2022-01-01 RX ADMIN — KETAMINE HYDROCHLORIDE 0.8 MG/KG/HR: 50 INJECTION, SOLUTION INTRAMUSCULAR; INTRAVENOUS at 17:03

## 2022-01-01 RX ADMIN — SODIUM CHLORIDE, PRESERVATIVE FREE 10 ML: 5 INJECTION INTRAVENOUS at 07:45

## 2022-01-01 RX ADMIN — HEPARIN SODIUM 1050 UNITS/HR: 10000 INJECTION, SOLUTION INTRAVENOUS; SUBCUTANEOUS at 17:11

## 2022-01-01 RX ADMIN — SODIUM CHLORIDE, PRESERVATIVE FREE 10 ML: 5 INJECTION INTRAVENOUS at 08:43

## 2022-01-01 RX ADMIN — Medication: at 18:08

## 2022-01-01 RX ADMIN — FENTANYL CITRATE 50 MCG: 50 INJECTION INTRAMUSCULAR; INTRAVENOUS at 15:12

## 2022-01-01 RX ADMIN — KETAMINE HYDROCHLORIDE 0.7 MG/KG/HR: 50 INJECTION, SOLUTION INTRAMUSCULAR; INTRAVENOUS at 11:39

## 2022-01-01 RX ADMIN — NOREPINEPHRINE BITARTRATE 4 MCG/MIN: 1 INJECTION INTRAVENOUS at 09:24

## 2022-01-01 RX ADMIN — INSULIN LISPRO 2 UNITS: 100 INJECTION, SOLUTION INTRAVENOUS; SUBCUTANEOUS at 00:26

## 2022-01-01 RX ADMIN — NOREPINEPHRINE BITARTRATE 50 MCG/MIN: 1 INJECTION INTRAVENOUS at 22:26

## 2022-01-01 RX ADMIN — POTASSIUM CHLORIDE 20 MEQ: 29.8 INJECTION INTRAVENOUS at 13:49

## 2022-01-01 RX ADMIN — NOREPINEPHRINE BITARTRATE 6 MCG/MIN: 1 INJECTION INTRAVENOUS at 05:04

## 2022-01-01 RX ADMIN — ANORECTAL OINTMENT: 15.7; .44; 24; 20.6 OINTMENT TOPICAL at 08:54

## 2022-01-01 RX ADMIN — DOBUTAMINE 5 MCG/KG/MIN: 12.5 INJECTION, SOLUTION, CONCENTRATE INTRAVENOUS at 16:01

## 2022-01-01 RX ADMIN — SODIUM PHOSPHATE, MONOBASIC, MONOHYDRATE 12 MMOL: 276; 142 INJECTION, SOLUTION INTRAVENOUS at 15:23

## 2022-01-01 RX ADMIN — CIPROFLOXACIN HYDROCHLORIDE 500 MG: 500 TABLET, FILM COATED ORAL at 20:13

## 2022-01-01 RX ADMIN — APIXABAN 2.5 MG: 5 TABLET, FILM COATED ORAL at 07:27

## 2022-01-01 RX ADMIN — SODIUM CHLORIDE, PRESERVATIVE FREE 10 ML: 5 INJECTION INTRAVENOUS at 08:08

## 2022-01-01 RX ADMIN — INSULIN LISPRO 6 UNITS: 100 INJECTION, SOLUTION INTRAVENOUS; SUBCUTANEOUS at 00:18

## 2022-01-01 RX ADMIN — INSULIN LISPRO 4 UNITS: 100 INJECTION, SOLUTION INTRAVENOUS; SUBCUTANEOUS at 09:06

## 2022-01-01 RX ADMIN — MAGNESIUM GLUCONATE 500 MG ORAL TABLET 800 MG: 500 TABLET ORAL at 22:10

## 2022-01-01 RX ADMIN — APIXABAN 5 MG: 5 TABLET, FILM COATED ORAL at 08:10

## 2022-01-01 RX ADMIN — DOCUSATE SODIUM 100 MG: 100 CAPSULE, LIQUID FILLED ORAL at 08:39

## 2022-01-01 RX ADMIN — PANTOPRAZOLE SODIUM 40 MG: 40 INJECTION, POWDER, FOR SOLUTION INTRAVENOUS at 08:47

## 2022-01-01 RX ADMIN — TIGECYCLINE 50 MG: 50 INJECTION, POWDER, LYOPHILIZED, FOR SOLUTION INTRAVENOUS at 11:48

## 2022-01-01 RX ADMIN — PANTOPRAZOLE SODIUM 40 MG: 40 INJECTION, POWDER, FOR SOLUTION INTRAVENOUS at 08:32

## 2022-01-01 RX ADMIN — SODIUM CHLORIDE, PRESERVATIVE FREE 10 ML: 5 INJECTION INTRAVENOUS at 08:10

## 2022-01-01 RX ADMIN — SENNOSIDES 17.2 MG: 8.6 TABLET, COATED ORAL at 21:10

## 2022-01-01 RX ADMIN — APIXABAN 5 MG: 5 TABLET, FILM COATED ORAL at 20:45

## 2022-01-01 RX ADMIN — SODIUM CHLORIDE, PRESERVATIVE FREE 10 ML: 5 INJECTION INTRAVENOUS at 10:13

## 2022-01-01 RX ADMIN — VASOPRESSIN 0.03 UNITS/MIN: 20 INJECTION PARENTERAL at 01:03

## 2022-01-01 RX ADMIN — IPRATROPIUM BROMIDE AND ALBUTEROL SULFATE 1 AMPULE: 2.5; .5 SOLUTION RESPIRATORY (INHALATION) at 23:20

## 2022-01-01 RX ADMIN — MIDODRINE HYDROCHLORIDE 15 MG: 5 TABLET ORAL at 17:08

## 2022-01-01 RX ADMIN — SODIUM CHLORIDE, PRESERVATIVE FREE 10 ML: 5 INJECTION INTRAVENOUS at 21:10

## 2022-01-01 RX ADMIN — CARBOXYMETHYLCELLULOSE SODIUM 1 DROP: 10 GEL OPHTHALMIC at 02:00

## 2022-01-01 RX ADMIN — MIDODRINE HYDROCHLORIDE 15 MG: 5 TABLET ORAL at 16:18

## 2022-01-01 RX ADMIN — FENTANYL CITRATE 50 MCG: 50 INJECTION INTRAMUSCULAR; INTRAVENOUS at 18:50

## 2022-01-01 RX ADMIN — HYDRALAZINE HYDROCHLORIDE 10 MG: 10 TABLET, FILM COATED ORAL at 08:43

## 2022-01-01 RX ADMIN — PANTOPRAZOLE SODIUM 40 MG: 40 TABLET, DELAYED RELEASE ORAL at 05:03

## 2022-01-01 RX ADMIN — ARGATROBAN 0.25 MCG/KG/MIN: 50 INJECTION INTRAVENOUS at 03:30

## 2022-01-01 RX ADMIN — IPRATROPIUM BROMIDE AND ALBUTEROL SULFATE 1 AMPULE: 2.5; .5 SOLUTION RESPIRATORY (INHALATION) at 22:57

## 2022-01-01 RX ADMIN — ANORECTAL OINTMENT: 15.7; .44; 24; 20.6 OINTMENT TOPICAL at 09:45

## 2022-01-01 RX ADMIN — CLINDAMYCIN PHOSPHATE 900 MG: 900 INJECTION, SOLUTION INTRAVENOUS at 10:36

## 2022-01-01 RX ADMIN — EPOETIN ALFA-EPBX 3000 UNITS: 3000 INJECTION, SOLUTION INTRAVENOUS; SUBCUTANEOUS at 08:08

## 2022-01-01 RX ADMIN — ALBUMIN (HUMAN) 25 G: 0.25 INJECTION, SOLUTION INTRAVENOUS at 02:59

## 2022-01-01 RX ADMIN — METOPROLOL SUCCINATE 25 MG: 25 TABLET, FILM COATED, EXTENDED RELEASE ORAL at 08:42

## 2022-01-01 RX ADMIN — INSULIN LISPRO 6 UNITS: 100 INJECTION, SOLUTION INTRAVENOUS; SUBCUTANEOUS at 00:05

## 2022-01-01 RX ADMIN — CALCIUM GLUCONATE 1000 MG: 98 INJECTION, SOLUTION INTRAVENOUS at 17:46

## 2022-01-01 RX ADMIN — POTASSIUM BICARBONATE 20 MEQ: 391 TABLET, EFFERVESCENT ORAL at 11:47

## 2022-01-01 RX ADMIN — DEXTROSE MONOHYDRATE 125 ML: 100 INJECTION, SOLUTION INTRAVENOUS at 04:37

## 2022-01-01 RX ADMIN — ASPIRIN 81 MG 81 MG: 81 TABLET ORAL at 20:49

## 2022-01-01 RX ADMIN — SODIUM CHLORIDE, PRESERVATIVE FREE 10 ML: 5 INJECTION INTRAVENOUS at 20:23

## 2022-01-01 RX ADMIN — MIDODRINE HYDROCHLORIDE 15 MG: 5 TABLET ORAL at 12:30

## 2022-01-01 RX ADMIN — SODIUM CHLORIDE, PRESERVATIVE FREE 10 ML: 5 INJECTION INTRAVENOUS at 09:10

## 2022-01-01 RX ADMIN — DOCUSATE SODIUM 100 MG: 100 CAPSULE, LIQUID FILLED ORAL at 21:25

## 2022-01-01 RX ADMIN — STANDARDIZED SENNA CONCENTRATE AND DOCUSATE SODIUM 2 TABLET: 8.6; 5 TABLET ORAL at 20:49

## 2022-01-01 RX ADMIN — TIGECYCLINE 50 MG: 50 INJECTION, POWDER, LYOPHILIZED, FOR SOLUTION INTRAVENOUS at 11:52

## 2022-01-01 RX ADMIN — SODIUM CHLORIDE, PRESERVATIVE FREE 10 ML: 5 INJECTION INTRAVENOUS at 09:12

## 2022-01-01 RX ADMIN — APIXABAN 5 MG: 5 TABLET, FILM COATED ORAL at 21:55

## 2022-01-01 RX ADMIN — HEPARIN SODIUM 1050 UNITS/HR: 10000 INJECTION, SOLUTION INTRAVENOUS; SUBCUTANEOUS at 11:06

## 2022-01-01 RX ADMIN — ARGATROBAN 0.12 MCG/KG/MIN: 50 INJECTION INTRAVENOUS at 15:06

## 2022-01-01 RX ADMIN — IPRATROPIUM BROMIDE AND ALBUTEROL SULFATE 1 AMPULE: .5; 2.5 SOLUTION RESPIRATORY (INHALATION) at 23:26

## 2022-01-01 RX ADMIN — IPRATROPIUM BROMIDE AND ALBUTEROL SULFATE 1 AMPULE: .5; 2.5 SOLUTION RESPIRATORY (INHALATION) at 16:48

## 2022-01-01 RX ADMIN — INSULIN GLARGINE 33 UNITS: 100 INJECTION, SOLUTION SUBCUTANEOUS at 08:34

## 2022-01-01 RX ADMIN — FERROUS SULFATE TAB 325 MG (65 MG ELEMENTAL FE) 325 MG: 325 (65 FE) TAB at 15:32

## 2022-01-01 RX ADMIN — CLINDAMYCIN IN 5 PERCENT DEXTROSE 900 MG: 18 INJECTION, SOLUTION INTRAVENOUS at 02:56

## 2022-01-01 RX ADMIN — Medication: at 04:22

## 2022-01-01 RX ADMIN — CIPROFLOXACIN HYDROCHLORIDE 500 MG: 500 TABLET, FILM COATED ORAL at 08:34

## 2022-01-01 RX ADMIN — WHITE PETROLATUM 57.7 %-MINERAL OIL 31.9 % EYE OINTMENT: at 09:12

## 2022-01-01 RX ADMIN — CETIRIZINE HYDROCHLORIDE 10 MG: 10 TABLET ORAL at 08:53

## 2022-01-01 RX ADMIN — WHITE PETROLATUM 57.7 %-MINERAL OIL 31.9 % EYE OINTMENT: at 01:44

## 2022-01-01 RX ADMIN — Medication: at 12:55

## 2022-01-01 RX ADMIN — CEFTAZIDIME 1000 MG: 1 INJECTION, POWDER, FOR SOLUTION INTRAMUSCULAR; INTRAVENOUS at 21:04

## 2022-01-01 RX ADMIN — IPRATROPIUM BROMIDE AND ALBUTEROL SULFATE 1 AMPULE: 2.5; .5 SOLUTION RESPIRATORY (INHALATION) at 19:00

## 2022-01-01 RX ADMIN — MIDODRINE HYDROCHLORIDE 10 MG: 10 TABLET ORAL at 11:49

## 2022-01-01 RX ADMIN — FENTANYL CITRATE 25 MCG: 50 INJECTION INTRAMUSCULAR; INTRAVENOUS at 15:52

## 2022-01-01 RX ADMIN — MIDODRINE HYDROCHLORIDE 10 MG: 10 TABLET ORAL at 17:04

## 2022-01-01 RX ADMIN — APIXABAN 2.5 MG: 5 TABLET, FILM COATED ORAL at 07:52

## 2022-01-01 RX ADMIN — OXYCODONE HYDROCHLORIDE AND ACETAMINOPHEN 1 TABLET: 5; 325 TABLET ORAL at 03:13

## 2022-01-01 RX ADMIN — SODIUM PHOSPHATE, MONOBASIC, MONOHYDRATE 12 MMOL: 276; 142 INJECTION, SOLUTION INTRAVENOUS at 11:04

## 2022-01-01 RX ADMIN — WHITE PETROLATUM 57.7 %-MINERAL OIL 31.9 % EYE OINTMENT: at 00:11

## 2022-01-01 RX ADMIN — TRAZODONE HYDROCHLORIDE 50 MG: 50 TABLET ORAL at 22:28

## 2022-01-01 RX ADMIN — IPRATROPIUM BROMIDE AND ALBUTEROL SULFATE 1 AMPULE: 2.5; .5 SOLUTION RESPIRATORY (INHALATION) at 22:45

## 2022-01-01 RX ADMIN — NOREPINEPHRINE BITARTRATE 35 MCG/MIN: 1 INJECTION INTRAVENOUS at 12:11

## 2022-01-01 RX ADMIN — Medication: at 16:26

## 2022-01-01 RX ADMIN — SPIRONOLACTONE 50 MG: 25 TABLET ORAL at 08:42

## 2022-01-01 RX ADMIN — INSULIN GLARGINE 15 UNITS: 100 INJECTION, SOLUTION SUBCUTANEOUS at 21:14

## 2022-01-01 RX ADMIN — ASPIRIN 81 MG 81 MG: 81 TABLET ORAL at 20:35

## 2022-01-01 RX ADMIN — IPRATROPIUM BROMIDE AND ALBUTEROL SULFATE 1 AMPULE: 2.5; .5 SOLUTION RESPIRATORY (INHALATION) at 11:20

## 2022-01-01 RX ADMIN — SODIUM CHLORIDE, PRESERVATIVE FREE 10 ML: 5 INJECTION INTRAVENOUS at 19:59

## 2022-01-01 RX ADMIN — MEROPENEM 1000 MG: 1 INJECTION, POWDER, FOR SOLUTION INTRAVENOUS at 13:23

## 2022-01-01 RX ADMIN — KETAMINE HYDROCHLORIDE 0.7 MG/KG/HR: 50 INJECTION, SOLUTION INTRAMUSCULAR; INTRAVENOUS at 18:43

## 2022-01-01 RX ADMIN — CIPROFLOXACIN HYDROCHLORIDE 500 MG: 500 TABLET, FILM COATED ORAL at 08:55

## 2022-01-01 RX ADMIN — ALBUMIN (HUMAN) 25 G: 0.25 INJECTION, SOLUTION INTRAVENOUS at 03:00

## 2022-01-01 RX ADMIN — METOPROLOL SUCCINATE 25 MG: 25 TABLET, FILM COATED, EXTENDED RELEASE ORAL at 09:54

## 2022-01-01 RX ADMIN — WHITE PETROLATUM 57.7 %-MINERAL OIL 31.9 % EYE OINTMENT: at 18:19

## 2022-01-01 RX ADMIN — HYDRALAZINE HYDROCHLORIDE 10 MG: 10 TABLET, FILM COATED ORAL at 08:33

## 2022-01-01 RX ADMIN — OXYCODONE AND ACETAMINOPHEN 1 TABLET: 10; 325 TABLET ORAL at 14:03

## 2022-01-01 RX ADMIN — VANCOMYCIN HYDROCHLORIDE 1250 MG: 10 INJECTION, POWDER, LYOPHILIZED, FOR SOLUTION INTRAVENOUS at 09:33

## 2022-01-01 RX ADMIN — OXYCODONE HYDROCHLORIDE AND ACETAMINOPHEN 1 TABLET: 5; 325 TABLET ORAL at 21:30

## 2022-01-01 RX ADMIN — CIPROFLOXACIN HYDROCHLORIDE 500 MG: 500 TABLET, FILM COATED ORAL at 20:14

## 2022-01-01 RX ADMIN — MONTELUKAST SODIUM 10 MG: 10 TABLET, COATED ORAL at 22:28

## 2022-01-01 RX ADMIN — MIDODRINE HYDROCHLORIDE 10 MG: 10 TABLET ORAL at 08:22

## 2022-01-01 RX ADMIN — NOREPINEPHRINE BITARTRATE 10 MCG/MIN: 1 INJECTION INTRAVENOUS at 06:29

## 2022-01-01 RX ADMIN — MIDODRINE HYDROCHLORIDE 10 MG: 10 TABLET ORAL at 08:26

## 2022-01-01 RX ADMIN — MIDODRINE HYDROCHLORIDE 10 MG: 10 TABLET ORAL at 08:32

## 2022-01-01 RX ADMIN — Medication: at 06:40

## 2022-01-01 RX ADMIN — CARBOXYMETHYLCELLULOSE SODIUM 1 DROP: 10 GEL OPHTHALMIC at 16:10

## 2022-01-01 RX ADMIN — TIGECYCLINE 100 MG: 50 INJECTION, POWDER, LYOPHILIZED, FOR SOLUTION INTRAVENOUS at 23:13

## 2022-01-01 RX ADMIN — INSULIN LISPRO 6 UNITS: 100 INJECTION, SOLUTION INTRAVENOUS; SUBCUTANEOUS at 11:32

## 2022-01-01 RX ADMIN — CALCIUM GLUCONATE 1000 MG: 98 INJECTION, SOLUTION INTRAVENOUS at 09:57

## 2022-01-01 RX ADMIN — ACETAMINOPHEN 650 MG: 325 TABLET ORAL at 20:27

## 2022-01-01 RX ADMIN — CARBOXYMETHYLCELLULOSE SODIUM 1 DROP: 10 GEL OPHTHALMIC at 06:18

## 2022-01-01 RX ADMIN — INSULIN LISPRO 12 UNITS: 100 INJECTION, SOLUTION INTRAVENOUS; SUBCUTANEOUS at 11:24

## 2022-01-01 RX ADMIN — Medication 1430 UNITS/HR: at 10:02

## 2022-01-01 RX ADMIN — FENTANYL CITRATE 50 MCG: 50 INJECTION, SOLUTION INTRAMUSCULAR; INTRAVENOUS at 16:17

## 2022-01-01 RX ADMIN — SODIUM PHOSPHATE, MONOBASIC, MONOHYDRATE 6 MMOL: 276; 142 INJECTION, SOLUTION INTRAVENOUS at 06:15

## 2022-01-01 RX ADMIN — HYDRALAZINE HYDROCHLORIDE 10 MG: 10 TABLET, FILM COATED ORAL at 12:29

## 2022-01-01 RX ADMIN — HYDROMORPHONE HYDROCHLORIDE 0.5 MG: 1 INJECTION, SOLUTION INTRAMUSCULAR; INTRAVENOUS; SUBCUTANEOUS at 15:20

## 2022-01-01 RX ADMIN — IPRATROPIUM BROMIDE AND ALBUTEROL SULFATE 1 AMPULE: 2.5; .5 SOLUTION RESPIRATORY (INHALATION) at 08:07

## 2022-01-01 RX ADMIN — HEPARIN SODIUM 2000 UNITS: 1000 INJECTION INTRAVENOUS; SUBCUTANEOUS at 19:12

## 2022-01-01 RX ADMIN — METOPROLOL SUCCINATE 25 MG: 25 TABLET, FILM COATED, EXTENDED RELEASE ORAL at 20:27

## 2022-01-01 RX ADMIN — OXYCODONE AND ACETAMINOPHEN 1 TABLET: 10; 325 TABLET ORAL at 08:10

## 2022-01-01 RX ADMIN — DOCUSATE SODIUM 100 MG: 100 CAPSULE, LIQUID FILLED ORAL at 21:10

## 2022-01-01 RX ADMIN — DOCUSATE SODIUM 100 MG: 100 CAPSULE, LIQUID FILLED ORAL at 08:28

## 2022-01-01 RX ADMIN — Medication: at 03:58

## 2022-01-01 RX ADMIN — MIDODRINE HYDROCHLORIDE 15 MG: 5 TABLET ORAL at 13:06

## 2022-01-01 RX ADMIN — Medication: at 11:50

## 2022-01-01 RX ADMIN — CHLORHEXIDINE GLUCONATE 0.12% ORAL RINSE 15 ML: 1.2 LIQUID ORAL at 20:06

## 2022-01-01 RX ADMIN — SENNOSIDES 17.2 MG: 8.6 TABLET, COATED ORAL at 21:55

## 2022-01-01 RX ADMIN — KETAMINE HYDROCHLORIDE 0.2 MG/KG/HR: 50 INJECTION, SOLUTION INTRAMUSCULAR; INTRAVENOUS at 07:42

## 2022-01-01 RX ADMIN — INSULIN LISPRO 4 UNITS: 100 INJECTION, SOLUTION INTRAVENOUS; SUBCUTANEOUS at 20:20

## 2022-01-01 RX ADMIN — INSULIN GLARGINE 33 UNITS: 100 INJECTION, SOLUTION SUBCUTANEOUS at 08:47

## 2022-01-01 RX ADMIN — CALCIUM GLUCONATE 1000 MG: 98 INJECTION, SOLUTION INTRAVENOUS at 17:05

## 2022-01-01 RX ADMIN — ANORECTAL OINTMENT: 15.7; .44; 24; 20.6 OINTMENT TOPICAL at 09:13

## 2022-01-01 RX ADMIN — SODIUM CHLORIDE 30 MG/ML INHALATION SOLUTION 4 ML: 30 SOLUTION INHALANT at 18:33

## 2022-01-01 RX ADMIN — CALCIUM GLUCONATE 1000 MG: 98 INJECTION, SOLUTION INTRAVENOUS at 04:04

## 2022-01-01 RX ADMIN — TOBRAMYCIN 300 MG: 300 SOLUTION RESPIRATORY (INHALATION) at 07:01

## 2022-01-01 RX ADMIN — ANORECTAL OINTMENT: 15.7; .44; 24; 20.6 OINTMENT TOPICAL at 22:11

## 2022-01-01 RX ADMIN — Medication 150 MG: at 11:10

## 2022-01-01 RX ADMIN — IPRATROPIUM BROMIDE AND ALBUTEROL SULFATE 1 AMPULE: 2.5; .5 SOLUTION RESPIRATORY (INHALATION) at 23:06

## 2022-01-01 RX ADMIN — NOREPINEPHRINE BITARTRATE 6 MCG/MIN: 1 INJECTION INTRAVENOUS at 02:21

## 2022-01-01 RX ADMIN — STANDARDIZED SENNA CONCENTRATE AND DOCUSATE SODIUM 2 TABLET: 8.6; 5 TABLET ORAL at 20:00

## 2022-01-01 RX ADMIN — MIDODRINE HYDROCHLORIDE 15 MG: 5 TABLET ORAL at 15:11

## 2022-01-01 RX ADMIN — FENTANYL CITRATE 50 MCG: 50 INJECTION, SOLUTION INTRAMUSCULAR; INTRAVENOUS at 11:37

## 2022-01-01 RX ADMIN — HEPARIN SODIUM 2000 UNITS: 1000 INJECTION INTRAVENOUS; SUBCUTANEOUS at 02:00

## 2022-01-01 RX ADMIN — ASPIRIN 81 MG 81 MG: 81 TABLET ORAL at 20:53

## 2022-01-01 RX ADMIN — SODIUM CHLORIDE 30 MG/ML INHALATION SOLUTION 4 ML: 30 SOLUTION INHALANT at 19:13

## 2022-01-01 RX ADMIN — STANDARDIZED SENNA CONCENTRATE AND DOCUSATE SODIUM 2 TABLET: 8.6; 5 TABLET ORAL at 20:55

## 2022-01-01 RX ADMIN — EPINEPHRINE 2 MCG/MIN: 1 INJECTION, SOLUTION, CONCENTRATE INTRAVENOUS at 09:25

## 2022-01-01 RX ADMIN — HYDRALAZINE HYDROCHLORIDE 10 MG: 10 TABLET, FILM COATED ORAL at 20:45

## 2022-01-01 RX ADMIN — POTASSIUM CHLORIDE 20 MEQ: 29.8 INJECTION INTRAVENOUS at 08:43

## 2022-01-01 RX ADMIN — IPRATROPIUM BROMIDE AND ALBUTEROL SULFATE 1 AMPULE: .5; 2.5 SOLUTION RESPIRATORY (INHALATION) at 18:56

## 2022-01-01 RX ADMIN — IPRATROPIUM BROMIDE AND ALBUTEROL SULFATE 1 AMPULE: .5; 2.5 SOLUTION RESPIRATORY (INHALATION) at 22:49

## 2022-01-01 RX ADMIN — FENTANYL CITRATE 50 MCG: 50 INJECTION, SOLUTION INTRAMUSCULAR; INTRAVENOUS at 06:56

## 2022-01-01 RX ADMIN — ASPIRIN 81 MG 81 MG: 81 TABLET ORAL at 20:07

## 2022-01-01 RX ADMIN — Medication: at 05:45

## 2022-01-01 RX ADMIN — ALBUTEROL SULFATE 2.5 MG: 2.5 SOLUTION RESPIRATORY (INHALATION) at 08:17

## 2022-01-01 RX ADMIN — DOBUTAMINE 5 MCG/KG/MIN: 12.5 INJECTION, SOLUTION, CONCENTRATE INTRAVENOUS at 18:05

## 2022-01-01 RX ADMIN — INSULIN GLARGINE 30 UNITS: 100 INJECTION, SOLUTION SUBCUTANEOUS at 09:35

## 2022-01-01 RX ADMIN — INSULIN GLARGINE 45 UNITS: 100 INJECTION, SOLUTION SUBCUTANEOUS at 21:07

## 2022-01-01 RX ADMIN — IOPAMIDOL 85 ML: 755 INJECTION, SOLUTION INTRAVENOUS at 14:54

## 2022-01-01 RX ADMIN — FUROSEMIDE 20 MG/HR: 10 INJECTION, SOLUTION INTRAMUSCULAR; INTRAVENOUS at 05:39

## 2022-01-01 RX ADMIN — CALCIUM GLUCONATE 1000 MG: 98 INJECTION, SOLUTION INTRAVENOUS at 07:57

## 2022-01-01 RX ADMIN — PANTOPRAZOLE SODIUM 40 MG: 40 INJECTION, POWDER, FOR SOLUTION INTRAVENOUS at 08:21

## 2022-01-01 RX ADMIN — DOCUSATE SODIUM 100 MG: 100 CAPSULE, LIQUID FILLED ORAL at 08:53

## 2022-01-01 RX ADMIN — SODIUM CHLORIDE, PRESERVATIVE FREE 10 ML: 5 INJECTION INTRAVENOUS at 07:27

## 2022-01-01 RX ADMIN — FERROUS SULFATE TAB 325 MG (65 MG ELEMENTAL FE) 325 MG: 325 (65 FE) TAB at 08:39

## 2022-01-01 RX ADMIN — MIDAZOLAM HYDROCHLORIDE 2 MG: 2 INJECTION, SOLUTION INTRAMUSCULAR; INTRAVENOUS at 17:47

## 2022-01-01 RX ADMIN — SODIUM CHLORIDE, PRESERVATIVE FREE 10 ML: 5 INJECTION INTRAVENOUS at 20:51

## 2022-01-01 RX ADMIN — HYDRALAZINE HYDROCHLORIDE 10 MG: 10 TABLET, FILM COATED ORAL at 09:45

## 2022-01-01 RX ADMIN — CEFTAZIDIME 1000 MG: 1 INJECTION, POWDER, FOR SOLUTION INTRAMUSCULAR; INTRAVENOUS at 14:15

## 2022-01-01 RX ADMIN — VANCOMYCIN HYDROCHLORIDE 1500 MG: 500 INJECTION, POWDER, LYOPHILIZED, FOR SOLUTION INTRAVENOUS at 13:08

## 2022-01-01 RX ADMIN — IPRATROPIUM BROMIDE AND ALBUTEROL SULFATE 1 AMPULE: .5; 2.5 SOLUTION RESPIRATORY (INHALATION) at 18:51

## 2022-01-01 RX ADMIN — SODIUM CHLORIDE, PRESERVATIVE FREE 10 ML: 5 INJECTION INTRAVENOUS at 07:59

## 2022-01-01 RX ADMIN — HYDROMORPHONE HYDROCHLORIDE 0.5 MG: 1 INJECTION, SOLUTION INTRAMUSCULAR; INTRAVENOUS; SUBCUTANEOUS at 04:30

## 2022-01-01 RX ADMIN — MIDODRINE HYDROCHLORIDE 10 MG: 10 TABLET ORAL at 07:44

## 2022-01-01 RX ADMIN — MIDODRINE HYDROCHLORIDE 10 MG: 10 TABLET ORAL at 08:05

## 2022-01-01 RX ADMIN — HYDROCORTISONE SODIUM SUCCINATE 100 MG: 100 INJECTION, POWDER, FOR SOLUTION INTRAMUSCULAR; INTRAVENOUS at 08:13

## 2022-01-01 RX ADMIN — IPRATROPIUM BROMIDE AND ALBUTEROL SULFATE 1 AMPULE: .5; 2.5 SOLUTION RESPIRATORY (INHALATION) at 12:21

## 2022-01-01 RX ADMIN — MIDAZOLAM HYDROCHLORIDE 2 MG: 2 INJECTION, SOLUTION INTRAMUSCULAR; INTRAVENOUS at 14:19

## 2022-01-01 RX ADMIN — INSULIN LISPRO 6 UNITS: 100 INJECTION, SOLUTION INTRAVENOUS; SUBCUTANEOUS at 21:36

## 2022-01-01 RX ADMIN — CETIRIZINE HYDROCHLORIDE 10 MG: 10 TABLET ORAL at 08:55

## 2022-01-01 RX ADMIN — OXYCODONE HYDROCHLORIDE AND ACETAMINOPHEN 1 TABLET: 7.5; 325 TABLET ORAL at 21:53

## 2022-01-01 RX ADMIN — CHLORHEXIDINE GLUCONATE 0.12% ORAL RINSE 15 ML: 1.2 LIQUID ORAL at 20:15

## 2022-01-01 RX ADMIN — Medication: at 11:02

## 2022-01-01 RX ADMIN — ACETAMINOPHEN 650 MG: 325 TABLET ORAL at 08:09

## 2022-01-01 RX ADMIN — NOREPINEPHRINE BITARTRATE 4 MCG/MIN: 1 INJECTION INTRAVENOUS at 18:17

## 2022-01-01 RX ADMIN — IPRATROPIUM BROMIDE AND ALBUTEROL SULFATE 1 AMPULE: 2.5; .5 SOLUTION RESPIRATORY (INHALATION) at 08:01

## 2022-01-01 RX ADMIN — FENTANYL CITRATE 50 MCG: 50 INJECTION INTRAMUSCULAR; INTRAVENOUS at 10:52

## 2022-01-01 RX ADMIN — WHITE PETROLATUM 57.7 %-MINERAL OIL 31.9 % EYE OINTMENT: at 10:54

## 2022-01-01 RX ADMIN — APIXABAN 5 MG: 5 TABLET, FILM COATED ORAL at 08:28

## 2022-01-01 RX ADMIN — OXYCODONE HYDROCHLORIDE AND ACETAMINOPHEN 1 TABLET: 5; 325 TABLET ORAL at 08:45

## 2022-01-01 RX ADMIN — APIXABAN 2.5 MG: 5 TABLET, FILM COATED ORAL at 08:21

## 2022-01-01 RX ADMIN — SODIUM CHLORIDE, PRESERVATIVE FREE 10 ML: 5 INJECTION INTRAVENOUS at 20:14

## 2022-01-01 RX ADMIN — MIDODRINE HYDROCHLORIDE 15 MG: 5 TABLET ORAL at 08:25

## 2022-01-01 RX ADMIN — CARBOXYMETHYLCELLULOSE SODIUM 1 DROP: 10 GEL OPHTHALMIC at 01:07

## 2022-01-01 RX ADMIN — DEXTROSE MONOHYDRATE 250 ML: 10 INJECTION, SOLUTION INTRAVENOUS at 18:50

## 2022-01-01 RX ADMIN — CHLORHEXIDINE GLUCONATE 0.12% ORAL RINSE 15 ML: 1.2 LIQUID ORAL at 20:50

## 2022-01-01 RX ADMIN — ANORECTAL OINTMENT: 15.7; .44; 24; 20.6 OINTMENT TOPICAL at 08:33

## 2022-01-01 RX ADMIN — MIDAZOLAM HYDROCHLORIDE 2 MG: 2 INJECTION, SOLUTION INTRAMUSCULAR; INTRAVENOUS at 00:23

## 2022-01-01 RX ADMIN — SODIUM PHOSPHATE, MONOBASIC, MONOHYDRATE 6 MMOL: 276; 142 INJECTION, SOLUTION INTRAVENOUS at 06:20

## 2022-01-01 RX ADMIN — VASOPRESSIN 0.04 UNITS/MIN: 20 INJECTION PARENTERAL at 08:16

## 2022-01-01 RX ADMIN — DEXTROSE MONOHYDRATE 125 ML: 100 INJECTION, SOLUTION INTRAVENOUS at 20:29

## 2022-01-01 RX ADMIN — TRAZODONE HYDROCHLORIDE 50 MG: 50 TABLET ORAL at 20:51

## 2022-01-01 RX ADMIN — IPRATROPIUM BROMIDE AND ALBUTEROL SULFATE 1 AMPULE: 2.5; .5 SOLUTION RESPIRATORY (INHALATION) at 03:20

## 2022-01-01 RX ADMIN — CETIRIZINE HYDROCHLORIDE 10 MG: 10 TABLET ORAL at 08:24

## 2022-01-01 RX ADMIN — VASOPRESSIN 0.04 UNITS/MIN: 20 INJECTION PARENTERAL at 20:42

## 2022-01-01 RX ADMIN — EPINEPHRINE 1 MG: 0.1 INJECTION, SOLUTION ENDOTRACHEAL; INTRACARDIAC; INTRAVENOUS at 05:09

## 2022-01-01 RX ADMIN — MIDODRINE HYDROCHLORIDE 10 MG: 10 TABLET ORAL at 11:33

## 2022-01-01 RX ADMIN — INSULIN LISPRO 4 UNITS: 100 INJECTION, SOLUTION INTRAVENOUS; SUBCUTANEOUS at 08:34

## 2022-01-01 RX ADMIN — IPRATROPIUM BROMIDE AND ALBUTEROL SULFATE 1 AMPULE: 2.5; .5 SOLUTION RESPIRATORY (INHALATION) at 16:02

## 2022-01-01 RX ADMIN — VASOPRESSIN 0.04 UNITS/MIN: 20 INJECTION PARENTERAL at 03:22

## 2022-01-01 RX ADMIN — TIGECYCLINE 50 MG: 50 INJECTION, POWDER, LYOPHILIZED, FOR SOLUTION INTRAVENOUS at 11:40

## 2022-01-01 RX ADMIN — CLINDAMYCIN IN 5 PERCENT DEXTROSE 900 MG: 18 INJECTION, SOLUTION INTRAVENOUS at 01:43

## 2022-01-01 RX ADMIN — TIGECYCLINE 50 MG: 50 INJECTION, POWDER, LYOPHILIZED, FOR SOLUTION INTRAVENOUS at 21:45

## 2022-01-01 RX ADMIN — METOPROLOL SUCCINATE 25 MG: 25 TABLET, EXTENDED RELEASE ORAL at 17:01

## 2022-01-01 RX ADMIN — VASOPRESSIN 0.03 UNITS/MIN: 20 INJECTION PARENTERAL at 11:58

## 2022-01-01 RX ADMIN — Medication: at 21:51

## 2022-01-01 RX ADMIN — STANDARDIZED SENNA CONCENTRATE AND DOCUSATE SODIUM 2 TABLET: 8.6; 5 TABLET ORAL at 08:10

## 2022-01-01 RX ADMIN — FUROSEMIDE 80 MG: 10 INJECTION, SOLUTION INTRAMUSCULAR; INTRAVENOUS at 13:08

## 2022-01-01 RX ADMIN — Medication 150 MG: at 11:11

## 2022-01-01 RX ADMIN — MIDODRINE HYDROCHLORIDE 10 MG: 10 TABLET ORAL at 16:29

## 2022-01-01 RX ADMIN — SENNOSIDES 17.2 MG: 8.6 TABLET, COATED ORAL at 21:24

## 2022-01-01 RX ADMIN — IPRATROPIUM BROMIDE AND ALBUTEROL SULFATE 1 AMPULE: 2.5; .5 SOLUTION RESPIRATORY (INHALATION) at 23:47

## 2022-01-01 RX ADMIN — TOBRAMYCIN 300 MG: 300 SOLUTION RESPIRATORY (INHALATION) at 07:54

## 2022-01-01 RX ADMIN — POTASSIUM CHLORIDE 20 MEQ: 29.8 INJECTION INTRAVENOUS at 10:41

## 2022-01-01 RX ADMIN — SODIUM CHLORIDE, PRESERVATIVE FREE 10 ML: 5 INJECTION INTRAVENOUS at 21:56

## 2022-01-01 RX ADMIN — CALCIUM GLUCONATE 1000 MG: 98 INJECTION, SOLUTION INTRAVENOUS at 22:19

## 2022-01-01 RX ADMIN — SPIRONOLACTONE 25 MG: 25 TABLET ORAL at 08:34

## 2022-01-01 RX ADMIN — INSULIN GLARGINE 10 UNITS: 100 INJECTION, SOLUTION SUBCUTANEOUS at 12:02

## 2022-01-01 RX ADMIN — OXYCODONE HYDROCHLORIDE AND ACETAMINOPHEN 1 TABLET: 5; 325 TABLET ORAL at 20:23

## 2022-01-01 RX ADMIN — VASOPRESSIN 0.03 UNITS/MIN: 20 INJECTION PARENTERAL at 21:27

## 2022-01-01 RX ADMIN — INSULIN LISPRO 6 UNITS: 100 INJECTION, SOLUTION INTRAVENOUS; SUBCUTANEOUS at 17:28

## 2022-01-01 RX ADMIN — DOCUSATE SODIUM 100 MG: 100 CAPSULE, LIQUID FILLED ORAL at 21:07

## 2022-01-01 RX ADMIN — NOREPINEPHRINE BITARTRATE 11 MCG/MIN: 1 INJECTION INTRAVENOUS at 08:26

## 2022-01-01 RX ADMIN — APIXABAN 2.5 MG: 5 TABLET, FILM COATED ORAL at 20:49

## 2022-01-01 RX ADMIN — APIXABAN 2.5 MG: 5 TABLET, FILM COATED ORAL at 20:15

## 2022-01-01 RX ADMIN — MAGNESIUM GLUCONATE 500 MG ORAL TABLET 800 MG: 500 TABLET ORAL at 21:17

## 2022-01-01 RX ADMIN — CHLORHEXIDINE GLUCONATE 0.12% ORAL RINSE 15 ML: 1.2 LIQUID ORAL at 07:44

## 2022-01-01 RX ADMIN — PANTOPRAZOLE SODIUM 40 MG: 40 TABLET, DELAYED RELEASE ORAL at 05:04

## 2022-01-01 RX ADMIN — MEROPENEM 1000 MG: 1 INJECTION, POWDER, FOR SOLUTION INTRAVENOUS at 20:50

## 2022-01-01 RX ADMIN — SODIUM PHOSPHATE, MONOBASIC, MONOHYDRATE 12 MMOL: 276; 142 INJECTION, SOLUTION INTRAVENOUS at 03:39

## 2022-01-01 RX ADMIN — IPRATROPIUM BROMIDE AND ALBUTEROL SULFATE 1 AMPULE: 2.5; .5 SOLUTION RESPIRATORY (INHALATION) at 18:55

## 2022-01-01 RX ADMIN — MIDODRINE HYDROCHLORIDE 10 MG: 10 TABLET ORAL at 17:10

## 2022-01-01 RX ADMIN — IPRATROPIUM BROMIDE AND ALBUTEROL SULFATE 1 AMPULE: .5; 2.5 SOLUTION RESPIRATORY (INHALATION) at 22:32

## 2022-01-01 RX ADMIN — INSULIN LISPRO 2 UNITS: 100 INJECTION, SOLUTION INTRAVENOUS; SUBCUTANEOUS at 16:48

## 2022-01-01 RX ADMIN — Medication: at 04:16

## 2022-01-01 RX ADMIN — Medication 150 MG: at 12:09

## 2022-01-01 RX ADMIN — STANDARDIZED SENNA CONCENTRATE AND DOCUSATE SODIUM 2 TABLET: 8.6; 5 TABLET ORAL at 07:44

## 2022-01-01 ASSESSMENT — PAIN SCALES - WONG BAKER
WONGBAKER_NUMERICALRESPONSE: 0
WONGBAKER_NUMERICALRESPONSE: 6
WONGBAKER_NUMERICALRESPONSE: 0
WONGBAKER_NUMERICALRESPONSE: 2
WONGBAKER_NUMERICALRESPONSE: 0
WONGBAKER_NUMERICALRESPONSE: 4
WONGBAKER_NUMERICALRESPONSE: 0
WONGBAKER_NUMERICALRESPONSE: 2
WONGBAKER_NUMERICALRESPONSE: 0
WONGBAKER_NUMERICALRESPONSE: 8
WONGBAKER_NUMERICALRESPONSE: 0
WONGBAKER_NUMERICALRESPONSE: 6
WONGBAKER_NUMERICALRESPONSE: 0
WONGBAKER_NUMERICALRESPONSE: 4
WONGBAKER_NUMERICALRESPONSE: 0
WONGBAKER_NUMERICALRESPONSE: 4
WONGBAKER_NUMERICALRESPONSE: 0
WONGBAKER_NUMERICALRESPONSE: 0

## 2022-01-01 ASSESSMENT — PULMONARY FUNCTION TESTS
PIF_VALUE: 19
PIF_VALUE: 21
PIF_VALUE: 25
PIF_VALUE: 25
PIF_VALUE: 18
PIF_VALUE: 31
PIF_VALUE: 30
PIF_VALUE: 27
PIF_VALUE: 27
PIF_VALUE: 30
PIF_VALUE: 29
PIF_VALUE: 31
PIF_VALUE: 18
PIF_VALUE: 30
PIF_VALUE: 30
PIF_VALUE: 28
PIF_VALUE: 26
PIF_VALUE: 20
PIF_VALUE: 38
PIF_VALUE: 37
PIF_VALUE: 37
PIF_VALUE: 31
PIF_VALUE: 31
PIF_VALUE: 25
PIF_VALUE: 33
PIF_VALUE: 32
PIF_VALUE: 35
PIF_VALUE: 26
PIF_VALUE: 30
PIF_VALUE: 33
PIF_VALUE: 18
PIF_VALUE: 28
PIF_VALUE: 33
PIF_VALUE: 25
PIF_VALUE: 24
PIF_VALUE: 30
PIF_VALUE: 29
PIF_VALUE: 33
PIF_VALUE: 21
PIF_VALUE: 34
PIF_VALUE: 27
PIF_VALUE: 25
PIF_VALUE: 27
PIF_VALUE: 27
PIF_VALUE: 26
PIF_VALUE: 27
PIF_VALUE: 25
PIF_VALUE: 30
PIF_VALUE: 17
PIF_VALUE: 18
PIF_VALUE: 31
PIF_VALUE: 37
PIF_VALUE: 25
PIF_VALUE: 18
PIF_VALUE: 18
PIF_VALUE: 26
PIF_VALUE: 31
PIF_VALUE: 27
PIF_VALUE: 26
PIF_VALUE: 30
PIF_VALUE: 25
PIF_VALUE: 24
PIF_VALUE: 31
PIF_VALUE: 27
PIF_VALUE: 25
PIF_VALUE: 31
PIF_VALUE: 35
PIF_VALUE: 18
PIF_VALUE: 29
PIF_VALUE: 30
PIF_VALUE: 28
PIF_VALUE: 35
PIF_VALUE: 30
PIF_VALUE: 32
PIF_VALUE: 32
PIF_VALUE: 25
PIF_VALUE: 29
PIF_VALUE: 28
PIF_VALUE: 25
PIF_VALUE: 20
PIF_VALUE: 29
PIF_VALUE: 31
PIF_VALUE: 36
PIF_VALUE: 29
PIF_VALUE: 40
PIF_VALUE: 11
PIF_VALUE: 26
PIF_VALUE: 28
PIF_VALUE: 29
PIF_VALUE: 26
PIF_VALUE: 24
PIF_VALUE: 29
PIF_VALUE: 30
PIF_VALUE: 16
PIF_VALUE: 22
PIF_VALUE: 32
PIF_VALUE: 32
PIF_VALUE: 20
PIF_VALUE: 28
PIF_VALUE: 30
PIF_VALUE: 31
PIF_VALUE: 33
PIF_VALUE: 18
PIF_VALUE: 29
PIF_VALUE: 41
PIF_VALUE: 32
PIF_VALUE: 31
PIF_VALUE: 35
PIF_VALUE: 30
PIF_VALUE: 31
PIF_VALUE: 34
PIF_VALUE: 25
PIF_VALUE: 28
PIF_VALUE: 27
PIF_VALUE: 31
PIF_VALUE: 19
PIF_VALUE: 29
PIF_VALUE: 26
PIF_VALUE: 24
PIF_VALUE: 30
PIF_VALUE: 25
PIF_VALUE: 23
PIF_VALUE: 31
PIF_VALUE: 29
PIF_VALUE: 34
PIF_VALUE: 18
PIF_VALUE: 33
PIF_VALUE: 25
PIF_VALUE: 26
PIF_VALUE: 30
PIF_VALUE: 33
PIF_VALUE: 25
PIF_VALUE: 27
PIF_VALUE: 39
PIF_VALUE: 25
PIF_VALUE: 29
PIF_VALUE: 24
PIF_VALUE: 40
PIF_VALUE: 30
PIF_VALUE: 28
PIF_VALUE: 24
PIF_VALUE: 35
PIF_VALUE: 25
PIF_VALUE: 26
PIF_VALUE: 15
PIF_VALUE: 34
PIF_VALUE: 28
PIF_VALUE: 31
PIF_VALUE: 24
PIF_VALUE: 33
PIF_VALUE: 32
PIF_VALUE: 28
PIF_VALUE: 29
PIF_VALUE: 29
PIF_VALUE: 28
PIF_VALUE: 30
PIF_VALUE: 39
PIF_VALUE: 33
PIF_VALUE: 18
PIF_VALUE: 26
PIF_VALUE: 30
PIF_VALUE: 29
PIF_VALUE: 31
PIF_VALUE: 33
PIF_VALUE: 34
PIF_VALUE: 30
PIF_VALUE: 29
PIF_VALUE: 29
PIF_VALUE: 27
PIF_VALUE: 20
PIF_VALUE: 30
PIF_VALUE: 27
PIF_VALUE: 34
PIF_VALUE: 28
PIF_VALUE: 33
PIF_VALUE: 24
PIF_VALUE: 27
PIF_VALUE: 29
PIF_VALUE: 29
PIF_VALUE: 27
PIF_VALUE: 27
PIF_VALUE: 21
PIF_VALUE: 24
PIF_VALUE: 25
PIF_VALUE: 26
PIF_VALUE: 32
PIF_VALUE: 29
PIF_VALUE: 11
PIF_VALUE: 26
PIF_VALUE: 33
PIF_VALUE: 29
PIF_VALUE: 20
PIF_VALUE: 28
PIF_VALUE: 19
PIF_VALUE: 33
PIF_VALUE: 18
PIF_VALUE: 29
PIF_VALUE: 31
PIF_VALUE: 32
PIF_VALUE: 30
PIF_VALUE: 29
PIF_VALUE: 23
PIF_VALUE: 25
PIF_VALUE: 28
PIF_VALUE: 31
PIF_VALUE: 27
PIF_VALUE: 28
PIF_VALUE: 11
PIF_VALUE: 25
PIF_VALUE: 25
PIF_VALUE: 28
PIF_VALUE: 24
PIF_VALUE: 29
PIF_VALUE: 26
PIF_VALUE: 31
PIF_VALUE: 38
PIF_VALUE: 35
PIF_VALUE: 25
PIF_VALUE: 33
PIF_VALUE: 30
PIF_VALUE: 33
PIF_VALUE: 32
PIF_VALUE: 31
PIF_VALUE: 26
PIF_VALUE: 28
PIF_VALUE: 26
PIF_VALUE: 29
PIF_VALUE: 27
PIF_VALUE: 27
PIF_VALUE: 26
PIF_VALUE: 25
PIF_VALUE: 39
PIF_VALUE: 30
PIF_VALUE: 26
PIF_VALUE: 30
PIF_VALUE: 18
PIF_VALUE: 32
PIF_VALUE: 29
PIF_VALUE: 23
PIF_VALUE: 29
PIF_VALUE: 32
PIF_VALUE: 31
PIF_VALUE: 27
PIF_VALUE: 29
PIF_VALUE: 31
PIF_VALUE: 26
PIF_VALUE: 29
PIF_VALUE: 29
PIF_VALUE: 25
PIF_VALUE: 32
PIF_VALUE: 29
PIF_VALUE: 28
PIF_VALUE: 32
PIF_VALUE: 28
PIF_VALUE: 17
PIF_VALUE: 29
PIF_VALUE: 25
PIF_VALUE: 31
PIF_VALUE: 29
PIF_VALUE: 33
PIF_VALUE: 28
PIF_VALUE: 31
PIF_VALUE: 32
PIF_VALUE: 26
PIF_VALUE: 29
PIF_VALUE: 26
PIF_VALUE: 28
PIF_VALUE: 19
PIF_VALUE: 30
PIF_VALUE: 35
PIF_VALUE: 27
PIF_VALUE: 32
PIF_VALUE: 25
PIF_VALUE: 26
PIF_VALUE: 26
PIF_VALUE: 27
PIF_VALUE: 39
PIF_VALUE: 32
PIF_VALUE: 14
PIF_VALUE: 30
PIF_VALUE: 24
PIF_VALUE: 26
PIF_VALUE: 28
PIF_VALUE: 29
PIF_VALUE: 32
PIF_VALUE: 18
PIF_VALUE: 31
PIF_VALUE: 31
PIF_VALUE: 30
PIF_VALUE: 31
PIF_VALUE: 29
PIF_VALUE: 28
PIF_VALUE: 16
PIF_VALUE: 26
PIF_VALUE: 27
PIF_VALUE: 34
PIF_VALUE: 31
PIF_VALUE: 30
PIF_VALUE: 29
PIF_VALUE: 28
PIF_VALUE: 37
PIF_VALUE: 31
PIF_VALUE: 31
PIF_VALUE: 26
PIF_VALUE: 26
PIF_VALUE: 30
PIF_VALUE: 35
PIF_VALUE: 27
PIF_VALUE: 25
PIF_VALUE: 27
PIF_VALUE: 16
PIF_VALUE: 32
PIF_VALUE: 33
PIF_VALUE: 23
PIF_VALUE: 27
PIF_VALUE: 30
PIF_VALUE: 20
PIF_VALUE: 21
PIF_VALUE: 25
PIF_VALUE: 30
PIF_VALUE: 24
PIF_VALUE: 25
PIF_VALUE: 32
PIF_VALUE: 32
PIF_VALUE: 14
PIF_VALUE: 18
PIF_VALUE: 26
PIF_VALUE: 32
PIF_VALUE: 27
PIF_VALUE: 29
PIF_VALUE: 38
PIF_VALUE: 29
PIF_VALUE: 31
PIF_VALUE: 28
PIF_VALUE: 26
PIF_VALUE: 36
PIF_VALUE: 32
PIF_VALUE: 31
PIF_VALUE: 26
PIF_VALUE: 26
PIF_VALUE: 24
PIF_VALUE: 23
PIF_VALUE: 32
PIF_VALUE: 20
PIF_VALUE: 25
PIF_VALUE: 47
PIF_VALUE: 31
PIF_VALUE: 26
PIF_VALUE: 22
PIF_VALUE: 28
PIF_VALUE: 18
PIF_VALUE: 38
PIF_VALUE: 36
PIF_VALUE: 29
PIF_VALUE: 26
PIF_VALUE: 20
PIF_VALUE: 31
PIF_VALUE: 25
PIF_VALUE: 29
PIF_VALUE: 33
PIF_VALUE: 28
PIF_VALUE: 29
PIF_VALUE: 28
PIF_VALUE: 20
PIF_VALUE: 38
PIF_VALUE: 30
PIF_VALUE: 25
PIF_VALUE: 20
PIF_VALUE: 33
PIF_VALUE: 35
PIF_VALUE: 23
PIF_VALUE: 26
PIF_VALUE: 22
PIF_VALUE: 15
PIF_VALUE: 33
PIF_VALUE: 32
PIF_VALUE: 27
PIF_VALUE: 19
PIF_VALUE: 26
PIF_VALUE: 32
PIF_VALUE: 23
PIF_VALUE: 14
PIF_VALUE: 27
PIF_VALUE: 30
PIF_VALUE: 26
PIF_VALUE: 21
PIF_VALUE: 30
PIF_VALUE: 32
PIF_VALUE: 25
PIF_VALUE: 32
PIF_VALUE: 31
PIF_VALUE: 38
PIF_VALUE: 34
PIF_VALUE: 21
PIF_VALUE: 31
PIF_VALUE: 35
PIF_VALUE: 31
PIF_VALUE: 38
PIF_VALUE: 28
PIF_VALUE: 24
PIF_VALUE: 29
PIF_VALUE: 27
PIF_VALUE: 30
PIF_VALUE: 32
PIF_VALUE: 27
PIF_VALUE: 35
PIF_VALUE: 24
PIF_VALUE: 38
PIF_VALUE: 33
PIF_VALUE: 31
PIF_VALUE: 24
PIF_VALUE: 23
PIF_VALUE: 29
PIF_VALUE: 29
PIF_VALUE: 26
PIF_VALUE: 30
PIF_VALUE: 35
PIF_VALUE: 33
PIF_VALUE: 28
PIF_VALUE: 29
PIF_VALUE: 32
PIF_VALUE: 33
PIF_VALUE: 32
PIF_VALUE: 30
PIF_VALUE: 29
PIF_VALUE: 27
PIF_VALUE: 34
PIF_VALUE: 24
PIF_VALUE: 27
PIF_VALUE: 16
PIF_VALUE: 30
PIF_VALUE: 29
PIF_VALUE: 29
PIF_VALUE: 30
PIF_VALUE: 31
PIF_VALUE: 25
PIF_VALUE: 36
PIF_VALUE: 14
PIF_VALUE: 31
PIF_VALUE: 25
PIF_VALUE: 32
PIF_VALUE: 28
PIF_VALUE: 23
PIF_VALUE: 28
PIF_VALUE: 31
PIF_VALUE: 27
PIF_VALUE: 36
PIF_VALUE: 18
PIF_VALUE: 33
PIF_VALUE: 29
PIF_VALUE: 18
PIF_VALUE: 26
PIF_VALUE: 29
PIF_VALUE: 32
PIF_VALUE: 26
PIF_VALUE: 21
PIF_VALUE: 30
PIF_VALUE: 31
PIF_VALUE: 34
PIF_VALUE: 20
PIF_VALUE: 32
PIF_VALUE: 30
PIF_VALUE: 31
PIF_VALUE: 27
PIF_VALUE: 30
PIF_VALUE: 18
PIF_VALUE: 27
PIF_VALUE: 35
PIF_VALUE: 31
PIF_VALUE: 31
PIF_VALUE: 14
PIF_VALUE: 18
PIF_VALUE: 33
PIF_VALUE: 26
PIF_VALUE: 34
PIF_VALUE: 27
PIF_VALUE: 39
PIF_VALUE: 30
PIF_VALUE: 28
PIF_VALUE: 28
PIF_VALUE: 30
PIF_VALUE: 33
PIF_VALUE: 29
PIF_VALUE: 11
PIF_VALUE: 31
PIF_VALUE: 33
PIF_VALUE: 28
PIF_VALUE: 23
PIF_VALUE: 29
PIF_VALUE: 37
PIF_VALUE: 18
PIF_VALUE: 26
PIF_VALUE: 26
PIF_VALUE: 31
PIF_VALUE: 30
PIF_VALUE: 37
PIF_VALUE: 27
PIF_VALUE: 25
PIF_VALUE: 33
PIF_VALUE: 30
PIF_VALUE: 24
PIF_VALUE: 31
PIF_VALUE: 32
PIF_VALUE: 29
PIF_VALUE: 34
PIF_VALUE: 20
PIF_VALUE: 32
PIF_VALUE: 37
PIF_VALUE: 28
PIF_VALUE: 29
PIF_VALUE: 32
PIF_VALUE: 33
PIF_VALUE: 31
PIF_VALUE: 36
PIF_VALUE: 20
PIF_VALUE: 25
PIF_VALUE: 29
PIF_VALUE: 33
PIF_VALUE: 27
PIF_VALUE: 30
PIF_VALUE: 31
PIF_VALUE: 30
PIF_VALUE: 33
PIF_VALUE: 25
PIF_VALUE: 31
PIF_VALUE: 26
PIF_VALUE: 31
PIF_VALUE: 24
PIF_VALUE: 28
PIF_VALUE: 20
PIF_VALUE: 35
PIF_VALUE: 14
PIF_VALUE: 26
PIF_VALUE: 30
PIF_VALUE: 32
PIF_VALUE: 20
PIF_VALUE: 29
PIF_VALUE: 24
PIF_VALUE: 31
PIF_VALUE: 37
PIF_VALUE: 29
PIF_VALUE: 35
PIF_VALUE: 31
PIF_VALUE: 31
PIF_VALUE: 28
PIF_VALUE: 28
PIF_VALUE: 26
PIF_VALUE: 29
PIF_VALUE: 29
PIF_VALUE: 37
PIF_VALUE: 31
PIF_VALUE: 29
PIF_VALUE: 34
PIF_VALUE: 31
PIF_VALUE: 36
PIF_VALUE: 30
PIF_VALUE: 28
PIF_VALUE: 20
PIF_VALUE: 29
PIF_VALUE: 26
PIF_VALUE: 26
PIF_VALUE: 34
PIF_VALUE: 34
PIF_VALUE: 20
PIF_VALUE: 27
PIF_VALUE: 30
PIF_VALUE: 27
PIF_VALUE: 26
PIF_VALUE: 29
PIF_VALUE: 35
PIF_VALUE: 32
PIF_VALUE: 33
PIF_VALUE: 35
PIF_VALUE: 35
PIF_VALUE: 30
PIF_VALUE: 28
PIF_VALUE: 28
PIF_VALUE: 34
PIF_VALUE: 22
PIF_VALUE: 29
PIF_VALUE: 35
PIF_VALUE: 25
PIF_VALUE: 19
PIF_VALUE: 36
PIF_VALUE: 30
PIF_VALUE: 18
PIF_VALUE: 27
PIF_VALUE: 30
PIF_VALUE: 35
PIF_VALUE: 27
PIF_VALUE: 32
PIF_VALUE: 29
PIF_VALUE: 26
PIF_VALUE: 29
PIF_VALUE: 28
PIF_VALUE: 29
PIF_VALUE: 29
PIF_VALUE: 31
PIF_VALUE: 18
PIF_VALUE: 25
PIF_VALUE: 29
PIF_VALUE: 31
PIF_VALUE: 29
PIF_VALUE: 33
PIF_VALUE: 25
PIF_VALUE: 29
PIF_VALUE: 31
PIF_VALUE: 32
PIF_VALUE: 32
PIF_VALUE: 42
PIF_VALUE: 26
PIF_VALUE: 30
PIF_VALUE: 29
PIF_VALUE: 25
PIF_VALUE: 30
PIF_VALUE: 35
PIF_VALUE: 27
PIF_VALUE: 27
PIF_VALUE: 30
PIF_VALUE: 25
PIF_VALUE: 29
PIF_VALUE: 23
PIF_VALUE: 20
PIF_VALUE: 31

## 2022-01-01 ASSESSMENT — PAIN SCALES - GENERAL
PAINLEVEL_OUTOF10: 4
PAINLEVEL_OUTOF10: 3
PAINLEVEL_OUTOF10: 3
PAINLEVEL_OUTOF10: 0
PAINLEVEL_OUTOF10: 10
PAINLEVEL_OUTOF10: 6
PAINLEVEL_OUTOF10: 0
PAINLEVEL_OUTOF10: 6
PAINLEVEL_OUTOF10: 0
PAINLEVEL_OUTOF10: 6
PAINLEVEL_OUTOF10: 0
PAINLEVEL_OUTOF10: 0
PAINLEVEL_OUTOF10: 8
PAINLEVEL_OUTOF10: 0
PAINLEVEL_OUTOF10: 3
PAINLEVEL_OUTOF10: 6
PAINLEVEL_OUTOF10: 0
PAINLEVEL_OUTOF10: 6
PAINLEVEL_OUTOF10: 6
PAINLEVEL_OUTOF10: 0
PAINLEVEL_OUTOF10: 0
PAINLEVEL_OUTOF10: 4
PAINLEVEL_OUTOF10: 0
PAINLEVEL_OUTOF10: 0
PAINLEVEL_OUTOF10: 10
PAINLEVEL_OUTOF10: 6
PAINLEVEL_OUTOF10: 4
PAINLEVEL_OUTOF10: 0
PAINLEVEL_OUTOF10: 0
PAINLEVEL_OUTOF10: 5
PAINLEVEL_OUTOF10: 0
PAINLEVEL_OUTOF10: 0
PAINLEVEL_OUTOF10: 5
PAINLEVEL_OUTOF10: 0
PAINLEVEL_OUTOF10: 0
PAINLEVEL_OUTOF10: 7
PAINLEVEL_OUTOF10: 4
PAINLEVEL_OUTOF10: 7
PAINLEVEL_OUTOF10: 6
PAINLEVEL_OUTOF10: 0
PAINLEVEL_OUTOF10: 4
PAINLEVEL_OUTOF10: 0
PAINLEVEL_OUTOF10: 6
PAINLEVEL_OUTOF10: 8
PAINLEVEL_OUTOF10: 0
PAINLEVEL_OUTOF10: 6
PAINLEVEL_OUTOF10: 7
PAINLEVEL_OUTOF10: 0
PAINLEVEL_OUTOF10: 0
PAINLEVEL_OUTOF10: 6
PAINLEVEL_OUTOF10: 6
PAINLEVEL_OUTOF10: 0
PAINLEVEL_OUTOF10: 7
PAINLEVEL_OUTOF10: 0
PAINLEVEL_OUTOF10: 0
PAINLEVEL_OUTOF10: 6
PAINLEVEL_OUTOF10: 5
PAINLEVEL_OUTOF10: 0
PAINLEVEL_OUTOF10: 6
PAINLEVEL_OUTOF10: 0
PAINLEVEL_OUTOF10: 0
PAINLEVEL_OUTOF10: 10
PAINLEVEL_OUTOF10: 0
PAINLEVEL_OUTOF10: 6
PAINLEVEL_OUTOF10: 6
PAINLEVEL_OUTOF10: 5
PAINLEVEL_OUTOF10: 0
PAINLEVEL_OUTOF10: 0
PAINLEVEL_OUTOF10: 5
PAINLEVEL_OUTOF10: 8
PAINLEVEL_OUTOF10: 0
PAINLEVEL_OUTOF10: 3
PAINLEVEL_OUTOF10: 1
PAINLEVEL_OUTOF10: 0
PAINLEVEL_OUTOF10: 0
PAINLEVEL_OUTOF10: 1
PAINLEVEL_OUTOF10: 8
PAINLEVEL_OUTOF10: 0
PAINLEVEL_OUTOF10: 0
PAINLEVEL_OUTOF10: 4
PAINLEVEL_OUTOF10: 6
PAINLEVEL_OUTOF10: 0
PAINLEVEL_OUTOF10: 5
PAINLEVEL_OUTOF10: 0
PAINLEVEL_OUTOF10: 0
PAINLEVEL_OUTOF10: 6
PAINLEVEL_OUTOF10: 0
PAINLEVEL_OUTOF10: 0
PAINLEVEL_OUTOF10: 7
PAINLEVEL_OUTOF10: 6
PAINLEVEL_OUTOF10: 8
PAINLEVEL_OUTOF10: 4
PAINLEVEL_OUTOF10: 7
PAINLEVEL_OUTOF10: 6
PAINLEVEL_OUTOF10: 0
PAINLEVEL_OUTOF10: 5
PAINLEVEL_OUTOF10: 0
PAINLEVEL_OUTOF10: 4
PAINLEVEL_OUTOF10: 0
PAINLEVEL_OUTOF10: 4
PAINLEVEL_OUTOF10: 0
PAINLEVEL_OUTOF10: 10
PAINLEVEL_OUTOF10: 4
PAINLEVEL_OUTOF10: 0
PAINLEVEL_OUTOF10: 6
PAINLEVEL_OUTOF10: 0
PAINLEVEL_OUTOF10: 8
PAINLEVEL_OUTOF10: 0
PAINLEVEL_OUTOF10: 0
PAINLEVEL_OUTOF10: 6
PAINLEVEL_OUTOF10: 0
PAINLEVEL_OUTOF10: 6
PAINLEVEL_OUTOF10: 0
PAINLEVEL_OUTOF10: 0
PAINLEVEL_OUTOF10: 10
PAINLEVEL_OUTOF10: 5
PAINLEVEL_OUTOF10: 0
PAINLEVEL_OUTOF10: 0
PAINLEVEL_OUTOF10: 1
PAINLEVEL_OUTOF10: 0
PAINLEVEL_OUTOF10: 0
PAINLEVEL_OUTOF10: 6
PAINLEVEL_OUTOF10: 0
PAINLEVEL_OUTOF10: 5
PAINLEVEL_OUTOF10: 0
PAINLEVEL_OUTOF10: 0
PAINLEVEL_OUTOF10: 8
PAINLEVEL_OUTOF10: 0
PAINLEVEL_OUTOF10: 6
PAINLEVEL_OUTOF10: 0
PAINLEVEL_OUTOF10: 7
PAINLEVEL_OUTOF10: 6
PAINLEVEL_OUTOF10: 0
PAINLEVEL_OUTOF10: 6
PAINLEVEL_OUTOF10: 10
PAINLEVEL_OUTOF10: 0
PAINLEVEL_OUTOF10: 4
PAINLEVEL_OUTOF10: 4
PAINLEVEL_OUTOF10: 3
PAINLEVEL_OUTOF10: 3
PAINLEVEL_OUTOF10: 0
PAINLEVEL_OUTOF10: 6
PAINLEVEL_OUTOF10: 0
PAINLEVEL_OUTOF10: 6
PAINLEVEL_OUTOF10: 0
PAINLEVEL_OUTOF10: 0
PAINLEVEL_OUTOF10: 6
PAINLEVEL_OUTOF10: 0
PAINLEVEL_OUTOF10: 6
PAINLEVEL_OUTOF10: 10
PAINLEVEL_OUTOF10: 6
PAINLEVEL_OUTOF10: 6
PAINLEVEL_OUTOF10: 0
PAINLEVEL_OUTOF10: 3
PAINLEVEL_OUTOF10: 0
PAINLEVEL_OUTOF10: 7
PAINLEVEL_OUTOF10: 1
PAINLEVEL_OUTOF10: 0
PAINLEVEL_OUTOF10: 6
PAINLEVEL_OUTOF10: 0
PAINLEVEL_OUTOF10: 7
PAINLEVEL_OUTOF10: 5
PAINLEVEL_OUTOF10: 0
PAINLEVEL_OUTOF10: 6
PAINLEVEL_OUTOF10: 6
PAINLEVEL_OUTOF10: 0
PAINLEVEL_OUTOF10: 6
PAINLEVEL_OUTOF10: 0
PAINLEVEL_OUTOF10: 3
PAINLEVEL_OUTOF10: 4
PAINLEVEL_OUTOF10: 6
PAINLEVEL_OUTOF10: 0
PAINLEVEL_OUTOF10: 6
PAINLEVEL_OUTOF10: 4
PAINLEVEL_OUTOF10: 0
PAINLEVEL_OUTOF10: 0
PAINLEVEL_OUTOF10: 5
PAINLEVEL_OUTOF10: 0
PAINLEVEL_OUTOF10: 0
PAINLEVEL_OUTOF10: 4
PAINLEVEL_OUTOF10: 0
PAINLEVEL_OUTOF10: 6
PAINLEVEL_OUTOF10: 0
PAINLEVEL_OUTOF10: 0
PAINLEVEL_OUTOF10: 6
PAINLEVEL_OUTOF10: 0
PAINLEVEL_OUTOF10: 8
PAINLEVEL_OUTOF10: 0
PAINLEVEL_OUTOF10: 6
PAINLEVEL_OUTOF10: 0
PAINLEVEL_OUTOF10: 8
PAINLEVEL_OUTOF10: 3
PAINLEVEL_OUTOF10: 0
PAINLEVEL_OUTOF10: 6
PAINLEVEL_OUTOF10: 8
PAINLEVEL_OUTOF10: 0
PAINLEVEL_OUTOF10: 0
PAINLEVEL_OUTOF10: 1
PAINLEVEL_OUTOF10: 0
PAINLEVEL_OUTOF10: 0
PAINLEVEL_OUTOF10: 10
PAINLEVEL_OUTOF10: 6

## 2022-01-01 ASSESSMENT — PAIN DESCRIPTION - LOCATION
LOCATION: BACK
LOCATION: HEAD
LOCATION: BACK
LOCATION: HEAD
LOCATION: BACK
LOCATION: BACK
LOCATION: HEAD
LOCATION: BACK
LOCATION: NECK
LOCATION: BACK;GENERALIZED
LOCATION: BACK
LOCATION: SHOULDER
LOCATION: BACK
LOCATION: BACK;NECK
LOCATION: BACK
LOCATION: BACK
LOCATION: NECK
LOCATION: BACK
LOCATION: NECK
LOCATION: HEAD
LOCATION: BACK
LOCATION: HEAD
LOCATION: SHOULDER
LOCATION: BACK
LOCATION: NECK
LOCATION: BACK
LOCATION: BACK

## 2022-01-01 ASSESSMENT — PAIN DESCRIPTION - DESCRIPTORS
DESCRIPTORS: ACHING;DISCOMFORT
DESCRIPTORS: DISCOMFORT
DESCRIPTORS: ACHING
DESCRIPTORS: HEADACHE
DESCRIPTORS: ACHING
DESCRIPTORS: ACHING;SORE
DESCRIPTORS: ACHING
DESCRIPTORS: ACHING;BURNING
DESCRIPTORS: DISCOMFORT
DESCRIPTORS: ACHING
DESCRIPTORS: DISCOMFORT
DESCRIPTORS: HEADACHE
DESCRIPTORS: ACHING
DESCRIPTORS: ACHING
DESCRIPTORS: PATIENT UNABLE TO DESCRIBE
DESCRIPTORS: ACHING
DESCRIPTORS: ACHING
DESCRIPTORS: ACHING;DISCOMFORT
DESCRIPTORS: ACHING
DESCRIPTORS: ACHING
DESCRIPTORS: DISCOMFORT
DESCRIPTORS: ACHING
DESCRIPTORS: ACHING;BURNING
DESCRIPTORS: DISCOMFORT
DESCRIPTORS: ACHING
DESCRIPTORS: HEADACHE
DESCRIPTORS: ACHING

## 2022-01-01 ASSESSMENT — PAIN DESCRIPTION - ORIENTATION
ORIENTATION: MID
ORIENTATION: MID
ORIENTATION: RIGHT;LEFT;MID
ORIENTATION: MID
ORIENTATION: MID
ORIENTATION: UPPER
ORIENTATION: MID
ORIENTATION: MID;UPPER
ORIENTATION: MID
ORIENTATION: MID
ORIENTATION: RIGHT
ORIENTATION: MID
ORIENTATION: MID
ORIENTATION: LEFT
ORIENTATION: MID
ORIENTATION: MID
ORIENTATION: RIGHT;LEFT;MID
ORIENTATION: MID

## 2022-01-01 ASSESSMENT — PAIN DESCRIPTION - PROGRESSION
CLINICAL_PROGRESSION: GRADUALLY WORSENING
CLINICAL_PROGRESSION: NOT CHANGED
CLINICAL_PROGRESSION: GRADUALLY WORSENING
CLINICAL_PROGRESSION: NOT CHANGED
CLINICAL_PROGRESSION: NOT CHANGED

## 2022-01-01 ASSESSMENT — PAIN DESCRIPTION - PAIN TYPE
TYPE: ACUTE PAIN
TYPE: CHRONIC PAIN
TYPE: ACUTE PAIN
TYPE: ACUTE PAIN
TYPE: CHRONIC PAIN
TYPE: CHRONIC PAIN
TYPE: ACUTE PAIN
TYPE: ACUTE PAIN;CHRONIC PAIN
TYPE: CHRONIC PAIN
TYPE: ACUTE PAIN
TYPE: ACUTE PAIN

## 2022-01-01 ASSESSMENT — PAIN DESCRIPTION - FREQUENCY
FREQUENCY: CONTINUOUS

## 2022-01-01 ASSESSMENT — PAIN DESCRIPTION - ONSET
ONSET: ON-GOING
ONSET: PROGRESSIVE
ONSET: AWAKENED FROM SLEEP
ONSET: ON-GOING
ONSET: GRADUAL
ONSET: ON-GOING

## 2022-01-01 ASSESSMENT — PAIN - FUNCTIONAL ASSESSMENT
PAIN_FUNCTIONAL_ASSESSMENT: PREVENTS OR INTERFERES SOME ACTIVE ACTIVITIES AND ADLS
PAIN_FUNCTIONAL_ASSESSMENT: ACTIVITIES ARE NOT PREVENTED
PAIN_FUNCTIONAL_ASSESSMENT: PREVENTS OR INTERFERES SOME ACTIVE ACTIVITIES AND ADLS
PAIN_FUNCTIONAL_ASSESSMENT: ACTIVITIES ARE NOT PREVENTED
PAIN_FUNCTIONAL_ASSESSMENT: PREVENTS OR INTERFERES SOME ACTIVE ACTIVITIES AND ADLS
PAIN_FUNCTIONAL_ASSESSMENT: PREVENTS OR INTERFERES WITH MANY ACTIVE NOT PASSIVE ACTIVITIES
PAIN_FUNCTIONAL_ASSESSMENT: ACTIVITIES ARE NOT PREVENTED

## 2022-01-01 ASSESSMENT — ENCOUNTER SYMPTOMS
GASTROINTESTINAL NEGATIVE: 1
RESPIRATORY NEGATIVE: 1

## 2022-01-03 NOTE — CARE COORDINATION
[facility] Transitions Initial Follow Up Call    Call within 2 business days of discharge: Yes    Patient: Jose Mcclellan Patient : 1965   MRN: [id number]  Reason for Admission: acute on chronic systolic CHF, ischemic CM, CAD s/p PCI, anasarca, CKD3, hypoxia, neurogenic bladder, prior hx PE (AC with Eliquis), Paroxysmal A Fib, COPD, DM2, paraplegia, GERD, recent cholecystitis/coledocho, hx stroke, chronic wounds (Dr Valencia Nuno). Discharge Date: 21 RARS: Readmission Risk Score: 25.2 ( )      Last Discharge [facility]       Complaint Diagnosis Description Type Department Provider    21 Shortness of Breath Acute on chronic congestive heart failure, unspecified heart failure type [facility]) ED to Hosp-Admission (Discharged) (Isela Parsons) Richard Dai MD; An. .. Spoke with: Jose Mcclellan (patient)    Facility: [facility]    Non-face-to-face services provided:  Obtained and reviewed discharge summary and/or continuity of care documents  Education of patient/family/caregiver/guardian to support self-management-order for neb sent to [facility] per [facility]  Assessment and support for treatment adherence and medication management-1111F completed    Was this an external facility discharge? No Discharge Facility: NA    Challenges to be reviewed by the provider   Additional needs identified to be addressed with provider No       Method of communication with provider : none    Advance Care Planning:   Does patient have an Advance Directive:  not on file. Was this a readmission? No  Patient stated reason for admission: SOB  Patients top risk factors for readmission:  functional physical ability  medical condition-see above  polypharmacy  utilization of services    Care Transition Nurse (CTN) contacted the patient by telephone to perform post hospital discharge assessment. Provided introduction to self, and explanation of the CTN role.      CTN reviewed discharge instructions, medical action plan and red flags with patient who verbalized understanding. Patient given an opportunity to ask questions and does not have any further questions or concerns at this time. Were discharge instructions available to patient? Yes. Reviewed appropriate site of care based on symptoms and resources available to patient including: PCP, Specialist and Home health. The patient agrees to contact the PCP office for questions related to their healthcare. Medication reconciliation was performed with patient, who verbalizes understanding of administration of home medications. COVID Risk Education    COVID-19 and Influenza A&B Not Detected on 12/29/2021. Patient was given an opportunity to verbalize any questions and concerns and agrees to contact CTN or health care provider for questions related to their healthcare. Reports feeling well. Delgado removed prior to discharge and straight cathing at home as prior to admission. Denies any concerns at this time - n/v/d, SOB, cough, edema. taking medications as noted. \[facility]\"" Alfonzo sent nebulizer order to [facility] and they [employer]. CTN to check with [employer] and ask pul to send order to them. He is in agreement with plan. [facility] [place]. resumed services this morning. Denies needs. CTN provided contact information for future needs. Outreach to [employer]. Confirmed they could fill a neb kit order - need order and demographics with insurance. Fax number ([phone]). Outreach to [place] office and asked them to send order to [employer]. Goldie Ace will have Dr [place] sign new order and fax to [employer]. Plan for follow-up call in 5-7 days based on severity of symptoms and risk factors.       Care Transitions 24 Hour Call    Schedule Follow Up Appointment with PCP: Completed  Do you have any ongoing symptoms?: No  Do you have a copy of your discharge instructions?: Yes  Do you have all of your prescriptions and are they filled?: Yes  Have you been contacted by a [address]?: No  Have you scheduled your follow up appointment?: Yes  How are you going to get to your appointment?: Other  Were you discharged with any Home Care or Post Acute Services: Yes  Post Acute Services: Home Health, Outpatient/Community Services, Transportation Services, Other (Comment: ambulance transportation, [facility], wound care center (Dr Omayra Story), Waiver program HHA (when staff allows))  Patient DME: Wheelchair, Hospital bed, Shower chair, Other  Other Patient DME: prateek lift  Patient Home Equipment: CPAP (Comment: pulse ox)  Do you have support at home?: Child  Do you feel like you have everything you need to keep you well at home?: Yes  Are you an active caregiver in your home?: Yes  Care Transitions Interventions  No Identified Needs         Follow Up  Future Appointments   Date Time Provider Alyson Cardona   1/7/2022 10:15 AM MD Yesenia Bello [facility]   1/21/2022  1:00 PM Oracio Holbrook MD P CLER CAR [facility]   2/21/2022  3:10 PM MD [facility] None   3/1/2022 11:30 AM MD OJ Smith [facility]       Unknown Rater, RN

## 2022-01-04 NOTE — TELEPHONE ENCOUNTER
----- Message from Ba Kapadia MD sent at 1/4/2022  8:06 AM EST -----  Contact: [phone] (Y)  Week of 17th  ----- Message -----  From: Kellen Perez MA  Sent: 1/3/2022  12:14 PM EST  To: Ba Kapadia MD    Pt needs a hospital follow up in 30 days was discharged on 12-29-21

## 2022-01-05 NOTE — PROGRESS NOTES
Physician Progress Note      PATIENT:               John Lazar  CSN #:                  [id number]  :                       1965  ADMIT DATE:       2021 11:35 AM  DISCH DATE:        2021 12:50 PM  RESPONDING  PROVIDER #:        Guilherme Leos MD          QUERY TEXT:    Patient admitted with HHD. Noted to also have pressure ulcers. If possible,   please document in progress notes and discharge summary the location, present   on admission status and stage of the pressure ulcer:[[Stage 4 Pressure Ulcer   of LLE and stage 2 pressure ulcer Rt knee not present on admission[de-identified] This   patient has a Stage 4 Pressure Ulcer of LLE and stage 2 pressure ulcer Rt knee   which was not present on admission.]      The medical record reflects the following:  Risk Factors: noted Chronic wounds  Clinical Indicators: progress note  - Chronic wounds and Skin: Warm and   dry. See wound Doc Flowsheets, wounds. Can you take the wounds or do I need to   query  Treatment: wound care and Infectious disease consult, dressing changes    Thank You Cal Renee RN, [email or web address]    Stage 1:  Non-blanchable erythema of intact skin  Stage 2:  Abrasion, Blister, Partial-thickness skin loss, with exposed dermis  Stage 3:  Full-thickness skin loss with damage or necrosis of subcutaneous   tissue  Stage 4:  Full-thickness skin & soft tissue loss through to underlying muscle,   tendon or bone  Unstageable: Obscured full-thickness skin & tissue loss  Options provided:  -- Stage 4 Pressure Ulcer of LLE and stage 2 pressure ulcer Rt knee present on   admission  -- Other - I will add my own diagnosis  -- Disagree - Not applicable / Not valid  -- Disagree - Clinically unable to determine / Unknown  -- Refer to Clinical Documentation Reviewer    PROVIDER RESPONSE TEXT:    This patient has a Stage 4 Pressure Ulcer of LLE and stage 2 pressure ulcer Rt   knee which was present on admission.     Query created by: Belkis Cleveland on 1/5/2022 3:03 PM      Electronically signed by:  Krissy Ruiz MD 1/5/2022 6:47 PM

## 2022-01-06 NOTE — CARE COORDINATION
[facility] Transitions Follow Up Call    2022    Patient: Luis Daniel Damon  Patient : 1965   MRN: [id number]  Reason for Admission: acute on chronic systolic CHF, ischemic CM, CAD s/p PCI, anasarca, CKD3, hypoxia, neurogenic bladder, prior hx PE (AC with Eliquis), Paroxysmal A Fib, COPD, DM2, paraplegia, GERD, recent cholecystitis/coledocho, hx stroke, chronic wounds (Dr Delfina Galeas). Discharge Date: 21 RARS: Readmission Risk Score: 25.2 ( )         Spoke with: Luis Daniel Damon (patient)    Got new nebulizer. Rescheduled echo for Monday. Sees Sinner tomorrow. UO good. Edema improved. Breathing issues with repositioning which is baseline. Denies needs at this time. Care Transitions Subsequent and Final Call    Schedule Follow Up Appointment with PCP: Completed  Subsequent and Final Calls  Do you have any ongoing symptoms?: No  Have your medications changed?: No  Do you have any questions related to your medications?: No  Do you currently have any active services?: Yes  Are you currently active with any services?: Home Health, Outpatient/Community Services, Transportation Services, Other  Do you have any needs or concerns that I can assist you with?: No  Identified Barriers: Impairment  Care Transitions Interventions  Other Interventions:            Follow Up  Future Appointments   Date Time Provider Alyson Cardona   2022 10:15 AM Nish Fernández MD Sonja Bachelor HOD   1/10/2022 12:30 PM [facility] ECHO ROOM [address] [place] [address]   2022  9:40 AM Shaun Ovalle MD [facility] None   2022  1:00 PM Michael Whitehead MD P CLER CAR [facility]   2022  3:10 PM MD Vinod [facility] None   3/1/2022 11:30 AM MD Edwin Summers NICHOLAS Black RN

## 2022-01-07 NOTE — PROGRESS NOTES
[facility] Physician Orders and Discharge [address], [facility], [facility]  Telephone: [phone]      Fax: [phone]        Your home care [facility] wound-care supplies will be provided by: [facility] care     NAME: Mike Correia Jr   DATE of BIRTH:  1965  PRIMARY DIAGNOSIS FOR WOUND CARE CENTER:  Pressure Ulcer Stage 2     Wound cleansing:   Do not scrub or use excessive force. Wash hands with soap and water before and after dressing changes. Prior to applying a clean dressing, cleanse wound with normal saline, wound cleanser, or mild soap and water.  Ask your physician or nurse before getting the wound(s) wet in the shower.                Wound care for home:    Left Lower Leg: today in wound clinic apply polysporin and bandaids to any superficial areas     Left Knee Wound:  Aquaphor at home as needed      Left Lateral/Posterior Leg:  Vashe,Collagen with or without silver, Mepilex border   Leave in place for a week then change     Make sure you are wearing your offloading boots when in bed      Right Knee:  Vashe  Versatil (need a few pieces to cover)   Plain foam  cast padding x 2  Kerlix  Leave in place for 1 week then change     Chronic Open Back Wound:   Dr. Giovanny Degroot used Silver Nitrate on your wound today.  The wound will be black or silver in appearance  for the next several days, this is normal.   Spray Hypochlorous Acid into wound let sit, do not rinse  Calmoseptine to alin wound  Silver Alginate for drainage as needed make sure to use 1 big piece over back wounds not individual pieces (we are applying today) and if home care uses drawtex make sure it is 1 piece as well  Cover with a Mepilex Border making sure to cover the skin tear   Change 3 x week     Scrotum/Buttocks/Posterior Thigh:   Calmoseptine to protect skin     Please note, all wounds (unless stated otherwise here) were mechanically debrided at the time of cleansing here in the wound-care center today, so a small amount of pain, drainage or bleeding from that process might be expected, and is normal.      All products for home use, including multiple products for a single wound if applicable, are medically necessary in order to achieve the best chance at timely wound healing. See provider documentation for details if needed.     Substituted dressings applied in the [facility] today, if applicable:           New orders for this week (labs, imaging, medications, etc.):             Additional instructions for specific diagnoses:     Continue to use the HIBICLENS (or the generic version) after your bath on the areas that are more troublesome such as your knee, chest and even around the back wound, make sure you let it dry, do not rinse        F/U Appointment is with Dr. Daniela Maldonado in 2 weeks   on                                   at                       .     Your nurse  is Heidi FLOWERS.      If we applied slip-resistant hospital socks today, be sure to remove them at least once a day to inspect your toes or feet, even if you're not changing the wraps or dressings underneath.  If you see anything concerning (redness, excess moisture, etc), please call and let us know right away.     Should you experience any significant changes in your wound(s) (including redness, increased warmth, increased pain, increased drainage, odor, or fever) or have questions about your wound care, please contact the [address] [place] [address] at [phone] Monday-Thursday from 8:00 am - 4:30 pm, or Friday from 8:00 am - 2:30 pm.  If you need help with your wound outside these hours and cannot wait until we are again available, contact your home-care company (if applicable), your PCP, or go to the nearest emergency room
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
## 2022-01-12 NOTE — TELEPHONE ENCOUNTER
Per MARISABEL, please call patient and move new patient [address] [facility],[address] appt up to Jan 27th @ 1130AM. OK per MRAEN

## 2022-01-13 NOTE — CARE COORDINATION
Care Transitions Outreach Attempt    Call within 2 business days of discharge: Yes   Attempted to reach patient for transitions of care follow up. Unable to reach patient. Unable to lvm. Ishmael Gallegos LPN, [facility]  PH: [phone]      Patient: Roberto Verduzco Patient : 1965 MRN: [id number]    Last Discharge [facility]       Complaint Diagnosis Description Type Department Provider    21 Shortness of Breath Acute on chronic congestive heart failure, unspecified heart failure type [facility]) ED to Hosp-Admission (Discharged) (Mike Marquez) Sahara Fuentes MD; An. .. Was this an external facility discharge?  No Discharge Facility:     Noted following upcoming appointments from discharge chart review:   [facility] follow up appointment(s):   Future Appointments   Date Time Provider Alyson Cardona   2022  9:40 AM Charla Morales MD [facility] None   2022 10:15 AM MD Vincent Clark [facility]   2022  1:00 PM Liz Soto MD [facility]   2022 11:30 AM MD Rafi Briones [facility]   2022  3:10 PM Bertrand Luna MD [facility] None   3/1/2022 11:30 AM [facility] up [facility](s):

## 2022-01-13 NOTE — TELEPHONE ENCOUNTER
Spoke to patient and notified of [address] message. He confirmed his has an appt with [facility] this month to discuss ICD.

## 2022-01-13 NOTE — TELEPHONE ENCOUNTER
----- Message from Kahlil Todd MD sent at 1/13/2022  3:11 PM EST -----  Please schedule follow up with EP for ICD consideration.

## 2022-01-16 PROBLEM — L02.212 ABSCESS OF BACK: Status: RESOLVED | Noted: 2021-01-01 | Resolved: 2022-01-01

## 2022-01-16 PROBLEM — I50.23 ACUTE ON CHRONIC SYSTOLIC CHF (CONGESTIVE HEART FAILURE) (HCC): Status: RESOLVED | Noted: 2021-01-01 | Resolved: 2022-01-01
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
## 2022-01-18 NOTE — CARE COORDINATION
Follow Up Call    Challenges to be reviewed by the provider   Additional needs identified to be addressed with provider: No           Encounter was not routed to provider for escalation. Method of communication with provider:  none. Contacted the patient by telephone to follow up after hospital visit. Status: significantly improved  Interventions to address identified needs: [facility] follow up appointment(s):   Future Appointments   Date Time Provider Alyson Dulce   1/20/2022  9:40 AM Sonam Pandey MD [facility] None   1/21/2022 10:15 AM MD Jil Lobato [facility]   1/21/2022  1:00 PM Florencio Garcia MD [facility]   1/27/2022 11:30 AM MD An Maldonado [facility]   2/21/2022  3:10 PM Fermín Gandara MD [facility] None   3/1/2022 11:30 AM Kayode Finch MD [facility]     [facility] follow up appointment(s): Dr Janet Mae 1/25/2022   Follow up appointment completed? Yes. Provided contact information for future needs. No further follow-up call indicated based on severity of symptoms and risk factors. Feels well and without complaint today. The home care nurse visits 3x weekly on weeks he does not go to wound care center MWF and MW when he does. He has a lot of upcoming appointments and knows dates/times/locations. He denies needs at this time. Handoff to [facility] at this time.      Laron Reyes RN  Care Transition Nurse  [phone] mobile
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
## 2022-01-20 PROBLEM — Z79.4 TYPE 2 DIABETES MELLITUS WITH DIABETIC PERIPHERAL ANGIOPATHY WITHOUT GANGRENE, WITH LONG-TERM CURRENT USE OF INSULIN (HCC): Status: RESOLVED | Noted: 2020-03-25 | Resolved: 2022-01-01

## 2022-01-20 PROBLEM — E11.51 TYPE 2 DIABETES MELLITUS WITH DIABETIC PERIPHERAL ANGIOPATHY WITHOUT GANGRENE, WITH LONG-TERM CURRENT USE OF INSULIN (HCC): Status: RESOLVED | Noted: 2020-03-25 | Resolved: 2022-01-01

## 2022-01-20 PROBLEM — I50.33 ACUTE ON CHRONIC DIASTOLIC CHF (CONGESTIVE HEART FAILURE) (HCC): Status: RESOLVED | Noted: 2021-01-01 | Resolved: 2022-01-01

## 2022-01-20 PROBLEM — E11.65 UNCONTROLLED TYPE 2 DIABETES MELLITUS WITH HYPERGLYCEMIA (HCC): Status: RESOLVED | Noted: 2019-10-03 | Resolved: 2022-01-01
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
## 2022-01-21 NOTE — PROGRESS NOTES
(unless stated otherwise here) were mechanically debrided at the time of cleansing here in the wound-care center today, so a small amount of pain, drainage or bleeding from that process might be expected, and is normal.      All products for home use, including multiple products for a single wound if applicable, are medically necessary in order to achieve the best chance at timely wound healing. See provider documentation for details if needed.     Substituted dressings applied in the [facility] today, if applicable:           New orders for this week (labs, imaging, medications, etc.):              Additional instructions for specific diagnoses:     Continue to use the HIBICLENS (or the generic version) after your bath on the areas that are more troublesome such as your knee, chest and even around the back wound, make sure you let it dry, do not rinse        F/U Appointment is with Dr. Colette Carnes in 2 weeks   on                                   at                       .     Your nurse  is Heidi FLOWERS.      If we applied slip-resistant hospital socks today, be sure to remove them at least once a day to inspect your toes or feet, even if you're not changing the wraps or dressings underneath.  If you see anything concerning (redness, excess moisture, etc), please call and let us know right away.     Should you experience any significant changes in your wound(s) (including redness, increased warmth, increased pain, increased drainage, odor, or fever) or have questions about your wound care, please contact the [address] [place] [address] at [phone] Monday-Thursday from 8:00 am - 4:30 pm, or Friday from 8:00 am - 2:30 pm.  If you need help with your wound outside these hours and cannot wait until we are again available, contact your home-care company (if applicable), your PCP, or go to the nearest emergency room

## 2022-01-21 NOTE — CARE COORDINATION
Chart reviewed. Patient completed care transition. Plan for follow up call on 1/28/22 as advised from CTN handoff.

## 2022-01-21 NOTE — PATIENT INSTRUCTIONS
Plan:  1. You could come into the hospital and try to help pull the extra fluid off or we could add metalazone 5mg on Monday, Wednesday, and Fridays which works in conjunction with torsemide  2. Current medications reviewed. Refills given as warranted. 3. I will call Dr. Burtis Galeazzi and discuss your fluid status   -Beginning today take your first dose of Metolazone 5mg. Take it in the morning with your Torsemide after today.   -follow up as scheduled on Tuesday with Dr. Burtis Galeazzi  4. Repeat blood work to check your kidneys on Wednesday.   -take metolazone in the AM on Wednesday before your blood draw. 5. Make sure you keep your Appointment with Dr. Ever العلي 1/27/22  6. Discussed risks and benefits of getting defibrillator to help pace the heart and to shock the heart.       Follow up with me the beginning of March 2022

## 2022-01-21 NOTE — PLAN OF CARE
No changes in wound care regime except to Right Stump as noted on AVS.  Patient is notably edematous in abdomen and RLE especially. He is to see his cardiologist today after his [facility] visit. Discharge instructions reviewed with patient, all questions answered, copy given to patient. Dressings were applied to all wounds per M.D. Instructions at this visit.
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
## 2022-01-27 NOTE — PROGRESS NOTES
Device interrogation by company representative. See interrogation for further details. Patient to see Dr. Violette Tran today    Last interrogation per [facility] 4/1/2020. Pt has a medtronic  ILR implanted 9/30/2016. Unable to interrogate device -EOS.

## 2022-01-27 NOTE — PATIENT INSTRUCTIONS
RECOMMENDATIONS:  1. Discussed risks and benefits of ICD placement to prevent harmful cardiac rhythms due to weak heart function. 2. Stress test prior to ICD placement. We will call you with results. 3. Start hydralazine 10 mg twice daily, call us to let us know how you feel with this medication. 4. Limited echo in 4 months to re assess heart function. 5. Follow up in 4 months after echo is completed. Your provider has ordered testing for further evaluation. An order/prescription has been included in your paper work.  To schedule outpatient testing, contact Central Scheduling by calling [address] [place] [address] ([phone]).
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
## 2022-01-30 PROBLEM — E87.70 FLUID OVERLOAD: Status: ACTIVE | Noted: 2022-01-01
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
## 2022-01-30 NOTE — ED PROVIDER NOTES
I independently examined and evaluated Goyo Virgen. I personally saw the patient and performed a substantive portion of the visit including all aspects of the medical decision making    In brief, Goyo Virgen is a 49 y.o. male with a past medical history of coronary artery disease, paraplegia, CHF, COPD, diabetes, stroke who presents to the ED complaining of fluid retention. Patient states he feels that he has been retaining fluids for approximately 10 days. Unknown amount of weight gain. He comes in today due to recommendation of cardiology that if he did not feel improvement after recent medication changes he should present to the ED. Patient is currently on torsemide 100, despite this she reports decreased urine output over the past few days. Isosorbide and hydralazine was also added to his medication regimen recently. Despite all this he reports worsening shortness of breath and fluid retention. He states he feels that he is short of breath due to his abdomen being enlarged. He denies chest pain, cough, fever. No palliative or provocative factors. REVIEW OF SYSTEMS  All systems reviewed, pertinent positives per HPI otherwise noted to be negative. Focused exam revealed   PHYSICAL EXAM  /76   Pulse 77   Temp 97 °F (36.1 °C)   Resp 22   SpO2 96%    GENERAL APPEARANCE: Awake and alert. Cooperative. HENT: Normocephalic. Atraumatic. Mucous membranes are moist.  Obese  NECK: Supple. Full range of motion of the neck without stiffness or pain. EYES: PERRL. EOM's grossly intact. HEART/CHEST: RRR. No murmurs. Chest wall is not tender to palpation. LUNGS: Respirations unlabored. CTAB. Good air exchange. Speaking comfortably in full sentences. ABDOMEN: No tenderness. Soft. distended. No masses. No organomegaly. No guarding or rebound. Chaperone present for scrotal exam.  Scrotum shows significant swelling, patient reports this is baseline. He denies any pain.   MUSCULOSKELETAL: No rx sent to [facility]   extremity edema. Compartments soft. No deformity. No tenderness in the extremities. All extremities neurovascularly intact. SKIN: Warm and dry. No acute rashes. NEUROLOGICAL: Alert and oriented. No gross facial drooping. Chronic paraplegia. PSYCHIATRIC: Normal mood and affect. ED course / MDM:   Overall chronically ill appearing patient, presenting for fluid retention and shortness of breath despite maximized outpatient management. Physical exam remarkable for evidence of fluid overload. I personally saw the patient and performed a substantive portion of the visit including all aspects of the medical decision making. Differential diagnosis includes but is not limited to: CHF exacerbation, Pneumonia, pneumothorax, pleural effusion, ACS, CHF exacerbation, COPD exacerbation, asthma, pulmonary embolism, arrhythmia, anemia, Covid    Labs, EKG, and imaging obtained. Workup showed:    XR CHEST PORTABLE   Final Result   Increasing right basilar pleural and parenchymal disease consistent with   atelectasis or pneumonia and a small pleural effusion      Improved left basilar subsegmental atelectasis             The Ekg interpreted by me shows  normal sinus rhythm with a rate of 74  Axis is   Left axis deviation  QTc is  prolonged 455  Intervals and Durations are unremarkable. ST Segments: no acute change  No significant change from prior EKG dated 1/27/22       ED Course as of 02/02/22 1518   Sun Jan 30, 2022   1505 Blood gas shows no acidemia or hypercarbia. Low suspicion for COPD exacerbation. [ER]   1505 Hyponatremia 133, hypochloremia 94. No other electrolyte abnormalities. [ER]   1505 Creatinine is increased from previous, may be related to fluid overload but may also be related to diuretic use. [ER]   1506 Liver function testing unremarkable. [ER]   1506 Mild anemia to 12. No leukocytosis or thrombocytopenia. [ER]   1506 Significant BNP elevation, increased compared to 1 month ago.   Patient has evidence of fluid overload on exam.  He has failed outpatient CHF exacerbation treatment. [ER]   1506 Troponin is elevated to 0.21. Patient denies chest pain. He reports progressive shortness of breath, suspect related to elevated BNP and SUDHA. Patient is chronically elevated to this amount. [ER]   1507 Urinalysis shows evidence of trace blood and leukocyte esterase. Patient has a Delgado in place. He does have evidence of numerous white blood cells. Patient self caths. We will send urine for culture, patient denies fever and does not have leukocytosis. Will hold off on antibiotics at this time [ER]   1507 CXR: IMPRESSION:  Increasing right basilar pleural and parenchymal disease consistent with atelectasis or pneumonia and a small pleural effusion     Improved left basilar subsegmental atelectasis  --  Patient denies cough or fever, he does report shortness of breath but states it feels that his abdomen is so enlarged that that is causing his shortness of breath. At this time lower suspicion for pneumonia. [ER]      ED Course User Index  [CS] NON[facility] Rose  [ER] Bhavesh Lee MD     Based on results of work-up, I am concerned for CHF exacerbation despite maximized therapy on an outpatient basis. Nephrology consulted and providing recommendations. At this time, do feel the patient requires admission for further work-up and management. Discussed the patient with hospital team.    CLINICAL IMPRESSION  1. Acute on chronic combined systolic and diastolic CHF (congestive heart failure) ([address]    2. Anasarca associated with disorder of kidney    3. SUDHA (acute kidney injury) ([address]    4. Decreased urine output        Blood pressure 131/76, pulse 77, temperature 97 °F (36.1 °C), resp. rate 22, SpO2 96 %. DISPOSITION  Holley Cogan was admitted in stable condition. All diagnostic, treatment, and disposition decisions were made by myself in conjunction with the advanced practice provider.     For all further details of the patient's emergency department visit, please see the advanced practice provider's documentation. Comment: Please note this report has been produced using speech recognition software and may contain errors related to that system including errors in grammar, punctuation, and spelling, as well as words and phrases that may be inappropriate. If there are any questions or concerns please feel free to contact the dictating provider for clarification.         Ursula Nicolas MD  02/02/22 8001

## 2022-01-30 NOTE — TELEPHONE ENCOUNTER
Writer contacted [facility] to inform of 30 day readmission risk. [facility] Montano informed writer of potential readmission.

## 2022-01-30 NOTE — ED NOTES
PerfectServe sent to Dr. Tomasa Guzman at [facility]  01/30/22 1602    [facility] completed with orders being placed at [facility]  01/30/22 1622

## 2022-01-30 NOTE — ED NOTES
Consult to Nephrology at [address]  01/30/22 1437    Consult completed with Dr. Nam Lackey in the ED at [address]  01/30/22 4704

## 2022-01-31 NOTE — ED NOTES
Report given to [facility]; pt left ER in stable condition.  BP (!) 146/82   Pulse 83   Temp 97 °F (36.1 °C)   Resp 23   SpO2 97%        Cruz Miller RN  01/31/22 0011
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
## 2022-01-31 NOTE — ED NOTES
Elise sent to Dr. Brad Olivera for RN [facility] for Dr. Brad Olivera to call regarding patients home meds at 2127     Bonnie Pérez  01/30/22 2129    Dr. Brad Olivera called back at 2130     Bonnie Pérez  01/30/22 2132

## 2022-01-31 NOTE — PROGRESS NOTES
Patient admitted to room 328 from [address] Patient oriented to room, call light, bed rails, phone, lights and bathroom. Patient instructed about the schedule of the day including: vital sign frequency, lab draws, possible tests, frequency of MD and staff rounds, daily weights, I &O's and prescribed diet. Bed alarm in place, patient aware of placement and reason. Telemetry box in place, patient aware of placement and reason. Bed locked, in lowest position, side rails up 2/4, call light within reach. Recliner Assessment  Patient is not able to demonstrated the ability to move from a reclining position to an upright position within the recliner. however patient is alert, oriented and able to provide informed consent    4 Eyes Skin Assessment     The patient is being assess for   Admission    I agree that 2 RN's have performed a thorough Head to Toe Skin Assessment on the patient. ALL assessment sites listed below have been assessed. Refused initial assessment of leg, feet, and heel by staff RNs on admit. Pt would like wound care to do initial assessment and change dressing. Areas assessed for pressure by both nurses:   [x]   Head, Face, and Ears   [x]   Shoulders, Back, and Chest, Abdomen  [x]   Arms, Elbows, and Hands   [x]   Coccyx, Sacrum, and Ischium  []   Legs, Feet, and Heels        Skin Assessed Under all Medical Devices by both nurses:  O2 device tubing, Delgado, Ostomy              All Mepilex Borders were peeled back and area peeked at by both nurses:  Yes  Please list where Mepilex Borders are located:  Mid back and coccyx. Pt refused initial assessment of bilateral lower extremities by both RNs at this time. He would prefer wound care change his dressings to his lower extremities. See pictures attached below for wounds assessed on admit.               **SHARE this note so that the co-signing nurse is able to place an eSignature**    Co-signer eSignature: Electronically signed by Dee Connolly Berlin Higginbotham on 1/31/22 at 3:48 AM EST    Does the Patient have Skin Breakdown related to pressure?   Yes LDA WOUND CARE was Initiated documentation include the Chetna-wound, Wound Assessment, Measurements, Dressing Treatment, Drainage, and Color\",                    Ismael Prevention initiated:  Yes   Wound Care Orders initiated:  Yes      [id number]  nurse consulted for Pressure Injury (Stage 3,4, Unstageable, DTI, NWPT, Complex wounds)and New or Established Ostomies:  Yes      Primary Nurse eSignature: Electronically signed by Pieter Genao RN on 1/31/22 at 2:45 AM EST

## 2022-01-31 NOTE — ED NOTES
Dr. Barbara Estrada, with nephrology, is aware of patient here and condition      Shelly Marcano  01/30/22 1950

## 2022-01-31 NOTE — PROGRESS NOTES
Patient placed on speciality mattress. PRN acetaminophen administered at this time for complaints of a headache.
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
## 2022-01-31 NOTE — ED NOTES
Attempted to call report. Nurse unable to take report at this time.       [facility]edaKind[facility]  01/31/22 0497

## 2022-01-31 NOTE — PROGRESS NOTES
[facility] Progress Note           Subjective/   49y.o. year old male who we are seeing in consultation for A/CKD-3. HPI:  Renal function stable with diuresis, urine output of 1.4 liters over last 24 hours. Remains short of breath, on 2 L NC.    ROS:  Intake adequate, +weak. Objective/   GEN:  Chronically ill, /81   Pulse 87   Temp 98.2 °F (36.8 °C) (Oral)   Resp 20   Ht 6' 2\" (1.88 m)   Wt 274 lb 3.2 oz (124.4 kg)   SpO2 97%   BMI 35.21 kg/m²   HEENT: non-icteric, no JVD  CV: S1, S2 without m/r/g; ++ LE edema  RESP: CTA B without w/r/r; breathing wnl  ABD: +bs, soft, nt, no hsm  SKIN: warm, no rashes    Data/  Recent Labs     01/30/22  1310 01/31/22  0233   WBC 6.9 7.7   HGB 12.0* 11.5*   HCT 38.1* 36.4*   MCV 76.2* 76.2*    195     Recent Labs     01/30/22  1310 01/31/22  0233   * 135*   K 4.3 3.7   CL 94* 95*   CO2 26 27   GLUCOSE 118* 153*   PHOS  --  3.8   MG  --  2.30   BUN 78* 80*   CREATININE 1.4* 1.4*   LABGLOM 53* 53*   GFRAA >60 >60       Assessment/     Acute Kidney Injury:  KDIGO stag 1  - Etiology:  Decompensated heart failure with tense abdominal wall edema and increased intra-abdominal pressure causing decreased renal blood flow and responsiveness to diuretics     Chronic Kidney Disease:  Stage 3  - Multiple episodes of SUDHA, previously needing HD as outpatient  - Last Scr was 1 but BUN 61     Hyponatremia- Hypervolemic   - Responding to diuresis     -CHF with reduced EF              Decompensated but respiratory status is ok     -Chronic dependent lymphedema in the setting of paraplegia     -Neurogenic bladder, history of self-catheterization every 4 hours              Now with langley    Plan/     Trial of lasix gtt at 20 mg/hr with prn metolazone  Does not appear to need IV albumin  Trend labs, bp's, & urine output    ____________________________________  Antoine Terry MD  [facility]  [email or web address]  Office: [phone]

## 2022-01-31 NOTE — PLAN OF CARE
Problem: Falls - Risk of:  Goal: Will remain free from falls  Description: Will remain free from falls  Outcome: Ongoing  Note: Call light within reach, bed alarm intact, non-skid socks in place.    Goal: Absence of physical injury  Description: Absence of physical injury  Outcome: Ongoing     Problem: Skin Integrity:  Goal: Will show no infection signs and symptoms  Description: Will show no infection signs and symptoms  Outcome: Ongoing  Goal: Absence of new skin breakdown  Description: Absence of new skin breakdown  Outcome: Ongoing

## 2022-01-31 NOTE — FLOWSHEET NOTE
01/31/22 0838   Vital Signs   Temp 98.2 °F (36.8 °C)   Temp Source Oral   Pulse 87   Heart Rate Source Monitor   Resp 20   /81   BP Location Right upper arm   Patient Position Semi fowlers   Level of Consciousness Alert (0)   MEWS Score 1   Patient Currently in Pain Denies   Pain Assessment   Prater-Baker Pain Rating 0   Oxygen Therapy   SpO2 97 %   Pulse Oximeter Device Mode Intermittent   Pulse Oximeter Device Location Finger   O2 Device Nasal cannula   O2 Flow Rate (L/min) 2 L/min   Patient is resting showing no s/s of distress. Patient is alert and oriented. Meds were given, see MAR. Patient is denying any needs. Bed is in lowest position and call light is within reach. Will continue to monitor. Shift assessment complete, see flowsheets.

## 2022-01-31 NOTE — PROGRESS NOTES
RT Inhaler-Nebulizer Bronchodilator Protocol Note    There is a bronchodilator order in the chart from a provider indicating to follow the RT Bronchodilator Protocol and there is an Initiate RT Inhaler-Nebulizer Bronchodilator Protocol order as well (see protocol at bottom of note). CXR Findings:  XR CHEST PORTABLE    Result Date: 1/30/2022  Increasing right basilar pleural and parenchymal disease consistent with atelectasis or pneumonia and a small pleural effusion Improved left basilar subsegmental atelectasis       The findings from the last RT Protocol Assessment were as follows:   History Pulmonary Disease: Chronic pulmonary disease  Respiratory Pattern: Dyspnea on exertion or RR 21-25 bpm  Breath Sounds: Clear breath sounds  Cough: Strong, spontaneous, non-productive  Indication for Bronchodilator Therapy: Decreased or absent breath sounds  Bronchodilator Assessment Score: 4    Aerosolized bronchodilator medication orders have been revised according to the RT Inhaler-Nebulizer Bronchodilator Protocol below. Respiratory Therapist to perform RT Therapy Protocol Assessment initially then follow the protocol. Repeat RT Therapy Protocol Assessment PRN for score 0-3 or on second treatment, BID, and PRN for scores above 3. No Indications - adjust the frequency to every 6 hours PRN wheezing or bronchospasm, if no treatments needed after 48 hours then discontinue using Per Protocol order mode. If indication present, adjust the RT bronchodilator orders based on the Bronchodilator Assessment Score as indicated below. Use Inhaler orders unless patient has one or more of the following: on home nebulizer, not able to hold breath for 10 seconds, is not alert and oriented, cannot activate and use MDI correctly, or respiratory rate 25 breaths per minute or more, then use the equivalent nebulizer order(s) with same Frequency and PRN reasons based on the score.   If a patient is on this medication at home then do

## 2022-01-31 NOTE — FLOWSHEET NOTE
01/31/22 1455   Vital Signs   Temp 98.1 °F (36.7 °C)   Temp Source Oral   Pulse 76   Heart Rate Source Monitor   Resp 18   /79   BP Location Right upper arm   Patient Position Semi fowlers   Level of Consciousness Alert (0)   MEWS Score 1   Patient Currently in Pain Denies   Oxygen Therapy   SpO2 98 %   Pulse Oximeter Device Mode Intermittent   Pulse Oximeter Device Location Finger   O2 Device Nasal cannula   O2 Flow Rate (L/min) 2 L/min   Patient resting. Family at bedside. Denies any needs Will continue to monitor.

## 2022-02-01 NOTE — FLOWSHEET NOTE
02/01/22 0851   Vital Signs   Temp 97.7 °F (36.5 °C)   Temp Source Oral   Pulse 87   Heart Rate Source Monitor   Resp 20   /79   BP Location Left upper arm   Patient Position Semi fowlers   Level of Consciousness Alert (0)   MEWS Score 1   Patient Currently in Pain Denies   Pain Assessment   Prater-Baker Pain Rating 0   Oxygen Therapy   SpO2 97 %   Pulse Oximeter Device Mode Intermittent   Pulse Oximeter Device Location Finger   O2 Device Nasal cannula   O2 Flow Rate (L/min) 1 L/min   Patient is resting showing no s/s of distress. Patient is alert and oriented. Meds were given, see MAR. Patient is denying any needs. Bed is in lowest position and call light is within reach. Will continue to monitor. Shift assessment complete, see flowsheets. Potassium being replaced at this time.

## 2022-02-01 NOTE — PLAN OF CARE
Nutrition Problem #1: Increased nutrient needs  Intervention: Food and/or Nutrient Delivery: Continue Current Diet,Start Oral Nutrition Supplement  Nutritional Goals: patient will consume 75% or greater of meals on ADULT DIET;  Regular; 4 carb choices per meal x 3 meals per day + accept and consume 75% or greater of Ensure HP with meals without additional lab/fluid disturbances; patient will adhere to 1500 ml daily FR;

## 2022-02-01 NOTE — FLOWSHEET NOTE
02/01/22 1230   Vital Signs   Temp 97.9 °F (36.6 °C)   Temp Source Oral   Pulse 85   Heart Rate Source Monitor   Resp 18   /79   BP Location Left upper arm   Patient Position Semi fowlers   Level of Consciousness Alert (0)   MEWS Score 1   Patient Currently in Pain Denies   Oxygen Therapy   SpO2 96 %   O2 Device Nasal cannula   O2 Flow Rate (L/min) 1 L/min   Patient resting. Potassium lab re-checked at this time.

## 2022-02-01 NOTE — FLOWSHEET NOTE
02/01/22 1649   Wound 11/05/21 # 81, mid-back, Surgical, full thickness, onset June 2015   Date First Assessed/Time First Assessed: 11/05/21 1133   Primary Wound Type: Surgical Type  Wound Description (Comments): # 81, mid-back, Surgical, full thickness, onset June 2015   Dressing Status Clean;Dry; Intact   Wound Cleansed Cleansed with saline   Dressing/Treatment Collagen with Ag; Foam   Wound Assessment Exposed hardware;Pink/red;Bleeding   Drainage Amount Large   Drainage Description Sanguinous   Odor Malodorous/putrid   Chetna-wound Assessment Blanchable erythema   Wound 01/31/22 Coccyx Large open area present to coccyx and buttocks area. Non-blanchable redness. Date First Assessed/Time First Assessed: 01/31/22 0030   Present on Hospital Admission: Yes  Primary Wound Type: Pressure Injury  Location: Coccyx  Wound Description (Comments): Large open area present to coccyx and buttocks area. Non-blanchable redn. .. Dressing Status New dressing applied   Wound Cleansed Cleansed with saline   Dressing/Treatment Foam   Wound Assessment Bleeding;Eschar moist;Pink/red;Slough; Non-blanchable erythema   Drainage Amount Scant   Drainage Description Serosanguinous   Odor Malodorous/putrid   Chetna-wound Assessment Non-blanchable erythema   wound care completed at this time.

## 2022-02-01 NOTE — PROGRESS NOTES
Admit: 2022    Name:  Fidelina Tapia  Room:  /4047-23  MRN:    [id number]     Daily Progress Note for 2022   Admitted with acute on chronic systolic congestive heart failure    Interval History:     Hypoglycemic this morning  Potassium is low.     Scheduled Meds:   insulin glargine  40 Units SubCUTAneous BID    hydrALAZINE  10 mg Oral BID    menthol-zinc oxide   Topical BID    apixaban  5 mg Oral BID    aspirin  81 mg Oral Nightly    cetirizine  10 mg Oral Daily    docusate sodium  100 mg Oral BID    ferrous sulfate  325 mg Oral Daily with breakfast    budesonide-formoterol  2 puff Inhalation BID    metoprolol succinate  25 mg Oral BID    montelukast  10 mg Oral Daily    pantoprazole  40 mg Oral QAM AC    polyethylene glycol  17 g Oral Daily    senna  2 tablet Oral Nightly    traZODone  50 mg Oral Nightly    insulin lispro  0-12 Units SubCUTAneous TID WC    insulin lispro  0-6 Units SubCUTAneous Nightly    sodium chloride flush  5-40 mL IntraVENous 2 times per day    magnesium oxide  800 mg Oral BID       Continuous Infusions:   dextrose 100 mL/hr (22 0812)    furosemide (LASIX) infusion 20 mg/hr (22 1517)    sodium chloride         PRN Meds:  potassium chloride, magnesium sulfate, albuterol, glucose, glucagon (rDNA), dextrose, dextrose bolus (hypoglycemia) **OR** dextrose bolus (hypoglycemia), albuterol, cyclobenzaprine, sodium chloride flush, sodium chloride, ondansetron **OR** ondansetron, acetaminophen **OR** acetaminophen                  Objective:     Temp  Av °F (36.7 °C)  Min: 97.7 °F (36.5 °C)  Max: 98.4 °F (36.9 °C)  Pulse  Av.5  Min: 73  Max: 87  BP  Min: 124/69  Max: 130/78  SpO2  Av.2 %  Min: 95 %  Max: 98 %  Patient Vitals for the past 4 hrs:   BP Temp Temp src Pulse Resp SpO2 Height   22 1053 -- -- -- -- -- -- 6' 2\" (1.88 m)   22 0851 125/79 97.7 °F (36.5 °C) Oral 87 20 97 % --   22 0815 -- -- -- -- 18 96 % -- Intake/Output Summary (Last 24 hours) at 2/1/2022 1129  Last data filed at 2/1/2022 0549  Gross per 24 hour   Intake 818.08 ml   Output 1400 ml   Net -581.92 ml       Physical Exam:  Gen: No distress. Alert. Eyes: PERRL. No sclera icterus. No conjunctival injection. ENT: No discharge. Pharynx clear. Neck: Trachea midline. Normal thyroid. Resp: No accessory muscle use. No crackles. No wheezes. No rhonchi. No dullness on percussion. CV: Regular rate. Regular rhythm. No murmur or rub. 3 + edema. Extending to the lower abdominal wall  GI: Non-tender. Non-distended. No masses. No organomegaly. Normal bowel sounds. No hernia. Left lower quadrant ostomy bag  Skin: Warm and dry. No nodule on exposed extremities. No rash on exposed extremities. Lymph: No cervical LAD. No supraclavicular LAD. M/S: Right BKA  Neuro: Paraplegia   Psych: Oriented x 3. No anxiety or agitation. Lab Data:  CBC:   Recent Labs     01/30/22  1310 01/31/22  0233 02/01/22  0445   WBC 6.9 7.7 8.1   RBC 5.00 4.78 4.73   HGB 12.0* 11.5* 11.6*   HCT 38.1* 36.4* 36.0*   MCV 76.2* 76.2* 76.1*   RDW 20.2* 19.4* 19.7*    195 see below     BMP:   Recent Labs     01/30/22  1310 01/31/22  0233 02/01/22  0445   * 135* 137   K 4.3 3.7 2.8*   CL 94* 95* 94*   CO2 26 27 29   PHOS  --  3.8 3.5   BUN 78* 80* 79*   CREATININE 1.4* 1.4* 1.3     BNP: No results for input(s): BNP in the last 72 hours. PT/INR: No results for input(s): PROTIME, INR in the last 72 hours. APTT:No results for input(s): APTT in the last 72 hours.   CARDIAC ENZYMES:   Recent Labs     01/30/22  1714 01/30/22  2013 01/31/22  0233   TROPONINI 0.21* 0.22* 0.24*     FASTING LIPID PANEL:  Lab Results   Component Value Date    CHOL 80 01/07/2022    HDL 30 01/07/2022    HDL 38 10/04/2011    TRIG 154 01/07/2022     LIVER PROFILE:   Recent Labs     01/30/22  1310   AST 12*   ALT 6*   BILITOT 0.7   ALKPHOS 124         XR CHEST PORTABLE   Final Result   Increasing right basilar pleural and parenchymal disease consistent with   atelectasis or pneumonia and a small pleural effusion      Improved left basilar subsegmental atelectasis               Assessment & Plan:     Patient Active Problem List    Diagnosis Date Noted    Fluid overload 01/30/2022    Stage 3a chronic kidney disease ([address]     Pressure ulcer of left leg, stage 4 ([address] 12/08/2021    S/P BKA (below knee amputation) unilateral, right ([address] 10/29/2021    Paroxysmal atrial fibrillation ([address] 10/29/2021    Acute on chronic renal insufficiency     Acute on chronic combined systolic and diastolic CHF (congestive heart failure) ([address] 09/05/2021    Ulcer of right knee, limited to breakdown of skin ([address] 08/03/2021    Hyponatremia     Choledocholithiasis     SUDHA (acute kidney injury) ([address]     Moderate persistent asthma without complication 28/16/3541    LIBORIO on CPAP     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 Onychomycosis 07/10/2017    Dystrophic nail 07/10/2017    Dehiscence of surgical wound of T-spine 02/16/2017    Hypergranulation 07/31/2016    Iron deficiency anemia due to chronic blood loss 07/09/2015    Lymphedema of both lower extremities 07/09/2015    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 ([address] 06/18/2015    Chronic back pain 08/20/2014    Arthritis 08/20/2014    Paraplegia, complete ([address] 03/10/2014    Colostomy in place [facility]) 03/10/2014    Neurogenic bladder 10/10/2013    Neurogenic bowel 10/10/2013    Mixed hyperlipidemia 02/22/2010    Coronary artery disease involving native coronary artery of native heart without angina pectoris 02/22/2010       #Acute on chronic systolic CHF  #Ischemic cardiomyopathy   #Anasarca  - EF 25-30% on most recent limited echo 1/10/22  - prior to admission he was taking Demadex 100 mg daily and metolazone 5 mg daily  - seen by nephro- now on Lasix gtt.   Monitor weights, UOP, and BMP  - cont toprol XL   - no ACEi/ARB or aldactone with renal issues  - hydralazine recently added by EP- continued  Hold lasix drip temporarily until his potassium levels get better.     #Hypoxia  - suspect 2/2 fluid overload  - currently on 2L - wean as able   - diurese as above, start IS, wean O2      #CKD3a  - f/b Dr. Usama Jade  - Baseline Crt 1.0  - Crt 1.4 on admit--> 1.3   - nephro c/s  - monitor BMP daily   - not meeting SUDHA criteria     #Hyponatremia  - improving with diuresis      #Neurogenic bladder  - practices ISC at home, now with langley cath in place for fluid management   - UA sent on admit with WBC, urine culture [email or web address]   Start oral cipro    #CAD  - with history of PCI  - he denies chest pain now   - elevated troponin noted- chronic on review of past labs  - cont ASA, statin, toprol      #History of PE  - cont Eliquis 5 mg BID      #COPD  - no AE  - cont inhalers      #DM2  - cont Lantus and use SSI  Decrease dose of Lantus given hypoglycemia this morning     #Right BKA     #Chronic wounds  - right knee, LLE, sacrum. F/b [facility]. Wound RN c/s     #Gitelman syndrome   - home magnesium continued      #Chronic anemia  - Hb stable   - cont PO iron     DVT Prophylaxis: Eliquis   Diet: ADULT DIET;  Regular; 4 carb choices (60 gm/meal); 1500 ml  Code Status: Full Code      Marce Regan MD

## 2022-02-01 NOTE — PLAN OF CARE
Problem: Falls - Risk of:  Goal: Will remain free from falls  Description: Will remain free from falls  1/31/2022 2314 by Jean-Pierre Emanuel RN  Outcome: Ongoing  Note: Call light within reach, bed alarm intact, non-skid socks in place. 1/31/2022 1911 by Jessica Ware RN  Outcome: Ongoing  Goal: Absence of physical injury  Description: Absence of physical injury  1/31/2022 2314 by eJan-Pierre Emanuel RN  Outcome: Ongoing  1/31/2022 1911 by Jessica Ware RN  Outcome: Ongoing     Problem: Skin Integrity:  Goal: Will show no infection signs and symptoms  Description: Will show no infection signs and symptoms  1/31/2022 2314 by Jean-Pierre Emanuel RN  Outcome: Ongoing  1/31/2022 1911 by Jessica Ware RN  Outcome: Ongoing  Goal: Absence of new skin breakdown  Description: Absence of new skin breakdown  1/31/2022 2314 by Jean-Pierre Emanuel RN  Outcome: Ongoing  Note: Turn and reposition.    1/31/2022 1911 by Jessica Ware RN  Outcome: Ongoing

## 2022-02-01 NOTE — PROGRESS NOTES
[facility] Progress Note           Subjective/   49y.o. year old male who we are seeing in consultation for A/CKD-3. HPI:  Renal function stable with diuresis, urine output of 2.1 liters over last 24 hours. Remains short of breath, oxygen being weaned, down to 1 L NC.    ROS:  Intake adequate, +weak.     Objective/   GEN:  Chronically ill, /79   Pulse 87   Temp 97.7 °F (36.5 °C) (Oral)   Resp 20   Ht 6' 2\" (1.88 m)   Wt 223 lb 1.7 oz (101.2 kg)   SpO2 97%   BMI 28.65 kg/m²   HEENT: non-icteric, no JVD  CV: S1, S2 without m/r/g; ++ LE edema  RESP: CTA B without w/r/r; breathing wnl  ABD: +bs, soft, nt, no hsm  SKIN: warm, no rashes    Data/  Recent Labs     01/30/22  1310 01/31/22  0233 02/01/22  0445   WBC 6.9 7.7 8.1   HGB 12.0* 11.5* 11.6*   HCT 38.1* 36.4* 36.0*   MCV 76.2* 76.2* 76.1*    195 see below     Recent Labs     01/30/22  1310 01/31/22  0233 02/01/22  0445   * 135* 137   K 4.3 3.7 2.8*   CL 94* 95* 94*   CO2 26 27 29   GLUCOSE 118* 153* 65*   PHOS  --  3.8 3.5   MG  --  2.30  --    BUN 78* 80* 79*   CREATININE 1.4* 1.4* 1.3   LABGLOM 53* 53* 57*   GFRAA >60 >60 >60       Assessment/     Acute Kidney Injury:  KDIGO stag 1  - Etiology:  Decompensated heart failure with tense abdominal wall edema and increased intra-abdominal pressure causing decreased renal blood flow and responsiveness to diuretics     Chronic Kidney Disease:  Stage 3  - Multiple episodes of SUDHA, previously needing HD as outpatient  - Last Scr was 1 but BUN 61     Hyponatremia- Hypervolemic   - Responding to diuresis     -CHF with reduced EF              Decompensated but respiratory status is ok     -Chronic dependent lymphedema in the setting of paraplegia     -Neurogenic bladder, history of self-catheterization every 4 hours              Now with langley    Plan/     Trial of lasix gtt at 20 mg/hr with prn metolazone - hold with low K levels until K better today  Does not appear to need IV albumin  Trend labs, bp's, & urine output    ____________________________________  Malena Strong MD  [facility]  [email or web address]  Office: [phone]

## 2022-02-01 NOTE — PROGRESS NOTES
RT Inhaler-Nebulizer Bronchodilator Protocol Note    There is a bronchodilator order in the chart from a provider indicating to follow the RT Bronchodilator Protocol and there is an Initiate RT Inhaler-Nebulizer Bronchodilator Protocol order as well (see protocol at bottom of note). CXR Findings:  XR CHEST PORTABLE    Result Date: 1/30/2022  Increasing right basilar pleural and parenchymal disease consistent with atelectasis or pneumonia and a small pleural effusion Improved left basilar subsegmental atelectasis       The findings from the last RT Protocol Assessment were as follows:   History Pulmonary Disease: Chronic pulmonary disease  Respiratory Pattern: Regular pattern and RR 12-20 bpm  Breath Sounds: Clear breath sounds  Cough: Strong, spontaneous, non-productive  Indication for Bronchodilator Therapy: None  Bronchodilator Assessment Score: 2    Aerosolized bronchodilator medication orders have been revised according to the RT Inhaler-Nebulizer Bronchodilator Protocol below. Respiratory Therapist to perform RT Therapy Protocol Assessment initially then follow the protocol. Repeat RT Therapy Protocol Assessment PRN for score 0-3 or on second treatment, BID, and PRN for scores above 3. No Indications - adjust the frequency to every 6 hours PRN wheezing or bronchospasm, if no treatments needed after 48 hours then discontinue using Per Protocol order mode. If indication present, adjust the RT bronchodilator orders based on the Bronchodilator Assessment Score as indicated below. Use Inhaler orders unless patient has one or more of the following: on home nebulizer, not able to hold breath for 10 seconds, is not alert and oriented, cannot activate and use MDI correctly, or respiratory rate 25 breaths per minute or more, then use the equivalent nebulizer order(s) with same Frequency and PRN reasons based on the score.   If a patient is on this medication at home then do not decrease Frequency below that used at home. 0-3 - enter or revise RT bronchodilator order(s) to equivalent RT Bronchodilator order with Frequency of every 4 hours PRN for wheezing or increased work of breathing using Per Protocol order mode. 4-6 - enter or revise RT Bronchodilator order(s) to two equivalent RT bronchodilator orders with one order with BID Frequency and one order with Frequency of every 4 hours PRN wheezing or increased work of breathing using Per Protocol order mode. 7-10 - enter or revise RT Bronchodilator order(s) to two equivalent RT bronchodilator orders with one order with TID Frequency and one order with Frequency of every 4 hours PRN wheezing or increased work of breathing using Per Protocol order mode. 11-13 - enter or revise RT Bronchodilator order(s) to one equivalent RT bronchodilator order with QID Frequency and an Albuterol order with Frequency of every 4 hours PRN wheezing or increased work of breathing using Per Protocol order mode. Greater than 13 - enter or revise RT Bronchodilator order(s) to one equivalent RT bronchodilator order with every 4 hours Frequency and an Albuterol order with Frequency of every 2 hours PRN wheezing or increased work of breathing using Per Protocol order mode.          Electronically signed by Nevaeh Bangura RCP on 1/31/2022 at 9:52 PM

## 2022-02-01 NOTE — CARE COORDINATION
Assessment attempted and NA in Pt room. VM left for Pt's spouse Rachell. Will need to f/u for assessment at a later time.

## 2022-02-01 NOTE — PROGRESS NOTES
While writer was on break, pt called out stating he felt as if his sugar was low. Other staff member covering checked sugar, and results within the 40's on assessment. Given snacks and a drink in hopes of raising sugar, but remains in the 60's on reassessment. Given a tube of glucose syrup, and was in the 60's, once again, 20 minutes after. Dextrose 5% gtt initiated after verification of IV compatibility with pharmacist on duty. Will reassess. No other complaints at this present time.

## 2022-02-01 NOTE — PROGRESS NOTES
Comprehensive Nutrition Assessment    Type and Reason for Visit:  Initial,Positive Nutrition Screen (+MST = 2, +wounds)    Nutrition Recommendations/Plan:   1. Continue ADULT DIET; Regular; 4 carb choices per meal with 1500 ml daily FR  2. Added Ensure HP with breakfast + dinner meals  3. Monitor appetite, meal intake + ONS intake. 4. Monitor weight trends, bowel function, wound healing, and for any additional lab/fluid disturbances. Nutrition Assessment:  Patient is nutritionally compromised AEB recent hospital admission @ [facility] 12/29-12/31 and patient was admitted with fluid overload, acute on chronic CHF + SUDHA, and pt is at risk for further compromise d/t increased nutrient needs r/t multiple (chronic) pressure wounds present and altered nutrition-related labs + patient is paraplegic (noted per previous RD assessment, cannot feel anything from the chest down and requires 24/7 care at home); will continue ADULT DIET; Regular; 4 carb choices per meal with 1500 ml daily FR and add Ensure HP with meals    Malnutrition Assessment:  Malnutrition Status: At risk for malnutrition (Comment)    Context:  Chronic Illness     Findings of the 6 clinical characteristics of malnutrition:  Energy Intake:  No significant decrease in energy intake  Weight Loss:  Unable to assess (d/t pt admitted with fluid overload + possible differences in bed scale weight/mattresses)     Body Fat Loss:  Unable to assess (difficult to assess d/t fluid accumulation)     Muscle Mass Loss:  Unable to assess (difficult to assess d/t disuse atrophy r/t paraplegia + fluid accumulation)    Fluid Accumulation:  7 - Severe  (Generalized +3 pitting; BUE +1; BLE +3-4; perineal, sacral +4)   Strength:  Not Performed    Estimated Daily Nutrient Needs:  Energy (kcal):  6465-2990 kcals based on 20-23 kcals/kg/CBW; Weight Used for Energy Requirements:  Current     Protein (g):  121-138 g protein based on 1.4-1.6 g/kg/IBW (using 86.4 kg);  Weight Used for Protein Requirements:  Ideal        Fluid (ml/day):  1500 ml daily FR; Method Used for Fluid Requirements:  Other (Comment) (daily FR)      Nutrition Related Findings:  met with pt at bedside; pt is Alert; pt reports he consumes 3 meals per day at home PTA + is consuming 100% of his meals during admission; denies any loss of appetite PTA; no difficulty chew or swallowing; hx of paraplegia + no feeling from the chest down; pt reports he consumes 2 Ensure HP at home PTA + agreeable to Ensure HP during admission; R BKA; Abd rounded, taut, distended, soft, tenderness, BS active; LLQ colostomy with black/green output today; pt with generalized +3 pitting edema, BUE +1, BLE +3-4, perineal+ scaral +4 edema; skin is warm, swollen, red, + cristina; LLE flaccid; presence of multiple chronic wounds; currently on 1 LNC; Cl, K+, BG and H/H are low; BUN is elevated; GFR = 57; Wounds:  Multiple,Pressure Injury,Stage IV,Full Thickness,Stage II (LLE stage 4 PI, mid back surgical full thickness, R knee cluster PI stage 2, large open area coccyx/buttocks- nonblanchable redness)       Current Nutrition Therapies:    ADULT DIET; Regular; 4 carb choices (60 gm/meal); 1500 ml  ADULT ORAL NUTRITION SUPPLEMENT; Breakfast, Dinner; Low Calorie/High Protein Oral Supplement    Anthropometric Measures:  · Height: 6' 2\" (188 cm)  · Current Body Weight: 223 lb 1.7 oz (101.2 kg) (obtaind 2/1/22; actual weight)   · Admission Body Weight: 274 lb 3.2 oz (124.4 kg) (obtained 1/31; actual weight, bed scale (pt admitted with fluid overload + possible differences in bed scales/mattresses))    · Usual Body Weight: 274 lb 3.2 oz (124.4 kg) (obtained 1/31; actual weight, bed scale (pt admitted with fluid overload + possible differences in bed scales/mattresses))     · Ideal Body Weight: 190 lbs; % Ideal Body Weight 117.4 %   · BMI: 28.6  · Adjusted Body Weight: 253;  Amputation,Paraplegia   · Adjusted BMI: 32.4    · BMI Categories: Overweight (BMI 25.0-29. 9)       Nutrition Diagnosis:   · Increased nutrient needs related to increase demand for energy/nutrients,renal dysfunction,cardiac dysfunction as evidenced by wounds,other (comment),localized or generalized fluid accumulation,lab values (hx of paraplegia, SUDHA, acute on chronic CHF)    Nutrition Interventions:   Food and/or Nutrient Delivery:  Continue Current Diet,Start Oral Nutrition Supplement  Nutrition Education/Counseling:  No recommendation at this time   Coordination of Nutrition Care:  Continue to monitor while inpatient    Goals:  patient will consume 75% or greater of meals on ADULT DIET;  Regular; 4 carb choices per meal x 3 meals per day + accept and consume 75% or greater of Ensure HP with meals without additional lab/fluid disturbances; patient will adhere to 1500 ml daily FR;       Nutrition Monitoring and Evaluation:   Behavioral-Environmental Outcomes:  None Identified   Food/Nutrient Intake Outcomes:  Food and Nutrient Intake,Supplement Intake,IVF Intake  Physical Signs/Symptoms Outcomes:  Biochemical Data,GI Status,Fluid Status or Edema,Hemodynamic Status,Nutrition Focused Physical Findings,Skin,Weight     Discharge Planning:    Continue current diet,Continue Oral Nutrition Supplement     Electronically signed by Linda Merino RD, LD on 2/1/22 at 3:21 PM EST    Contact: [phone]

## 2022-02-02 NOTE — PROGRESS NOTES
Admit: 2022    Name:  Anastasiya Enriquez  Room:  [facility]/1595-29  MRN:    [id number]     Daily Progress Note for 2022   Admitted with acute on chronic systolic congestive heart failure    Interval History:     Potassium is low today. Lasix drip had to be stopped until potassium was corrected.     Scheduled Meds:   insulin glargine  25 Units SubCUTAneous BID    ciprofloxacin  500 mg Oral 2 times per day    hydrALAZINE  10 mg Oral BID    menthol-zinc oxide   Topical BID    apixaban  5 mg Oral BID    aspirin  81 mg Oral Nightly    cetirizine  10 mg Oral Daily    docusate sodium  100 mg Oral BID    ferrous sulfate  325 mg Oral Daily with breakfast    budesonide-formoterol  2 puff Inhalation BID    metoprolol succinate  25 mg Oral BID    montelukast  10 mg Oral Daily    pantoprazole  40 mg Oral QAM AC    polyethylene glycol  17 g Oral Daily    senna  2 tablet Oral Nightly    traZODone  50 mg Oral Nightly    insulin lispro  0-12 Units SubCUTAneous TID WC    insulin lispro  0-6 Units SubCUTAneous Nightly    sodium chloride flush  5-40 mL IntraVENous 2 times per day    magnesium oxide  800 mg Oral BID       Continuous Infusions:   dextrose 100 mL/hr (22 0812)    furosemide (LASIX) infusion 20 mg/hr (22 1451)    sodium chloride 25 mL (22 1349)       PRN Meds:  potassium chloride, magnesium sulfate, albuterol, glucose, glucagon (rDNA), dextrose, dextrose bolus (hypoglycemia) **OR** dextrose bolus (hypoglycemia), albuterol, cyclobenzaprine, sodium chloride flush, sodium chloride, ondansetron **OR** ondansetron, acetaminophen **OR** acetaminophen                  Objective:     Temp  Av °F (36.7 °C)  Min: 97.8 °F (36.6 °C)  Max: 98.4 °F (36.9 °C)  Pulse  Av.5  Min: 70  Max: 85  BP  Min: 121/70  Max: 143/78  SpO2  Av.2 %  Min: 92 %  Max: 98 %  Patient Vitals for the past 4 hrs:   BP Temp Temp src Pulse Resp SpO2   22 1417 123/70 97.8 °F (36.6 °C) Oral 78 20 96 % Intake/Output Summary (Last 24 hours) at 2/2/2022 1535  Last data filed at 2/2/2022 1407  Gross per 24 hour   Intake 2451.73 ml   Output 1600 ml   Net 851.73 ml       Physical Exam:  Gen: No distress. Alert. Eyes: PERRL. No sclera icterus. No conjunctival injection. ENT: No discharge. Pharynx clear. Neck: Trachea midline. Normal thyroid. Resp: No accessory muscle use. No crackles. No wheezes. No rhonchi. No dullness on percussion. CV: Regular rate. Regular rhythm. No murmur or rub. 3 + edema. Extending to the lower abdominal wall  GI: Non-tender. Non-distended. No masses. No organomegaly. Normal bowel sounds. No hernia. Left lower quadrant ostomy bag  Skin: Warm and dry. No nodule on exposed extremities. No rash on exposed extremities. Lymph: No cervical LAD. No supraclavicular LAD. M/S: Right BKA  Neuro: Paraplegia   Psych: Oriented x 3. No anxiety or agitation. Lab Data:  CBC:   Recent Labs     01/31/22  0233 02/01/22  0445 02/02/22  0500   WBC 7.7 8.1 7.2   RBC 4.78 4.73 4.87   HGB 11.5* 11.6* 11.8*   HCT 36.4* 36.0* 37.2*   MCV 76.2* 76.1* 76.4*   RDW 19.4* 19.7* 19.8*    see below 187     BMP:   Recent Labs     01/31/22  0233 01/31/22  0233 02/01/22  0445 02/01/22  1315 02/01/22  1620 02/02/22  0500 02/02/22  1355   *  --  137  --   --  137  --    K 3.7   < > 2.8*   < > 3.6 2.9* 4.2   CL 95*  --  94*  --   --  95*  --    CO2 27  --  29  --   --  30  --    PHOS 3.8  --  3.5  --   --  3.9  --    BUN 80*  --  79*  --   --  79*  --    CREATININE 1.4*  --  1.3  --   --  1.4*  --     < > = values in this interval not displayed. BNP: No results for input(s): BNP in the last 72 hours. PT/INR: No results for input(s): PROTIME, INR in the last 72 hours. APTT:No results for input(s): APTT in the last 72 hours.   CARDIAC ENZYMES:   Recent Labs     01/30/22  1714 01/30/22 2013 01/31/22  0233   TROPONINI 0.21* 0.22* 0.24*     FASTING LIPID PANEL:  Lab Results   Component Value Date CHOL 80 01/07/2022    HDL 30 01/07/2022    HDL 38 10/04/2011    TRIG 154 01/07/2022     LIVER PROFILE:   No results for input(s): AST, ALT, ALB, BILIDIR, BILITOT, ALKPHOS in the last 72 hours.       XR CHEST PORTABLE   Final Result   Increasing right basilar pleural and parenchymal disease consistent with   atelectasis or pneumonia and a small pleural effusion      Improved left basilar subsegmental atelectasis               Assessment & Plan:     Patient Active Problem List    Diagnosis Date Noted    Fluid overload 01/30/2022    Stage 3a chronic kidney disease ([address]     Pressure ulcer of left leg, stage 4 ([address] 12/08/2021    S/P BKA (below knee amputation) unilateral, right ([address] 10/29/2021    Paroxysmal atrial fibrillation ([address] 10/29/2021    Acute on chronic renal insufficiency     Acute on chronic combined systolic and diastolic CHF (congestive heart failure) ([address] 09/05/2021    Ulcer of right knee, limited to breakdown of skin ([address] 08/03/2021    Hyponatremia     Choledocholithiasis     SUDHA (acute kidney injury) ([address]     Moderate persistent asthma without complication 02/68/5454    LIBORIO on CPAP     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 Onychomycosis 07/10/2017    Dystrophic nail 07/10/2017    Dehiscence of surgical wound of T-spine 02/16/2017    Hypergranulation 07/31/2016    Iron deficiency anemia due to chronic blood loss 07/09/2015    Lymphedema of both lower extremities 07/09/2015    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 ([address] 06/18/2015    Chronic back pain 08/20/2014    Arthritis 08/20/2014    Paraplegia, complete ([address] 03/10/2014    Colostomy in place [facility]) 03/10/2014    Neurogenic bladder 10/10/2013    Neurogenic bowel 10/10/2013    Mixed hyperlipidemia 02/22/2010    Coronary artery disease involving native coronary artery of native heart without angina pectoris 02/22/2010       #Acute on chronic systolic CHF  #Ischemic cardiomyopathy   #Anasarca  - EF 25-30% on most recent limited echo 1/10/22  - prior to admission he was taking Demadex 100 mg daily and metolazone 5 mg daily  - seen by nephro- now on Lasix gtt. Monitor weights, UOP, and BMP  - cont toprol XL   - no ACEi/ARB or aldactone with renal issues  - hydralazine recently added by EP- continued  Hold lasix drip temporarily until his potassium levels get better. Add Aldactone 25 mg daily     #Hypoxia  - suspect 2/2 fluid overload  - currently on 2L - wean as able   - diurese as above, start IS, wean O2      #CKD3a  - f/b Dr. Rosaura Epstein  - Baseline Crt 1.0  - Crt 1.4 on admit--> 1.3 --1.4  - nephro c/s  - monitor BMP daily   - not meeting SUDHA criteria     #Hyponatremia  - improving with diuresis      #Neurogenic bladder  - practices ISC at home, now with langley cath in place for fluid management   - UA sent on admit with WBC, urine culture e.coli   Continue oral cipro    #CAD  - with history of PCI  - he denies chest pain now   - elevated troponin noted- chronic on review of past labs  - cont ASA, statin, toprol      #History of PE  - cont Eliquis 5 mg BID      #COPD  - no AE  - cont inhalers      #DM2  - cont Lantus and use SSI  Decrease dose of Lantus given hypoglycemia this morning     #Right BKA     #Chronic wounds  - right knee, LLE, sacrum. F/b [facility]. Wound RN c/s     #Gitelman syndrome   - home magnesium continued      #Chronic anemia  - Hb stable   - cont PO iron     DVT Prophylaxis: Eliquis   Diet: ADULT DIET;  Regular; 4 carb choices (60 gm/meal); 1500 ml  Code Status: Full Code      Alma Nieto MD

## 2022-02-02 NOTE — PROGRESS NOTES
Pt has been pleasant this evening and resting in bed. Lasix gtt infusing at ordered rate. Provided a snack at bedtime. No other needs.

## 2022-02-02 NOTE — ACP (ADVANCE CARE PLANNING)
Advance Care Planning   Healthcare Decision Maker:    Primary Decision Maker: Bong Abraham Child - [phone]    Primary Decision Maker: Charisma Martinez - Spouse - [phone]    Click here to complete Healthcare Decision Makers including selection of the Healthcare Decision Maker Relationship (ie \"Primary\").          \plain

## 2022-02-02 NOTE — CARE COORDINATION
Case Management Assessment  Initial Evaluation      Patient Name: Frankie Rajput  YOB: 1965  Diagnosis: SUDHA (acute kidney injury) ([facility] [N17.9]  Fluid overload [E87.70]  Acute on chronic combined systolic and diastolic CHF (congestive heart failure) ([facility] [I50.43]  Anasarca associated with disorder of kidney [N04.9]  Decreased urine output [R34]  Date / Time: 1/30/2022 12:16 PM    Admission status/Date: INPT 1/30/22  Chart Reviewed: Yes      Patient Interviewed: Yes   Family Interviewed:  No      Hospitalization in the last 30 days:  No      Health Care Decision Maker :   Primary Decision MakeHeath Wise - Child - [phone]    Primary Decision Maker: Emily Barber - Spouse - [phone]    (CM - must 1st enter selection under Navigator - emergency contact- [place] Relationship and pick relationship)   Who do you trust or have selected to make healthcare decisions for you      Met with: pt via telephone  Interview conducted  (bedside/phone):    Current PCP: [address] required for SNF : Y, N          3 night stay required - Y, N    ADLS  Support Systems/Care Needs:    Transportation: EMS transportation    Meal Preparation: family    Housing  Living Arrangements: home with mother and dtr  Steps: ramp  Intent for return to present living arrangements: Yes  Identified Issues: -    [address] with [address] : Yes - [facility] Agency:(Services) SN     Passport/Waiver : Yes - hha on hold due to not avail staff at this time  : Carrie Berg                   Phone Number:    Passport/Waiver Services: hha on hold.            Durable Medical Equiptment   DME Provider: -  Equipment: -  Walker___Cane___RTS___ BSC___Shower Chair___Hospital Bed_X__W/C____Other_hoyer, power chair_______  02 at ____Liter(s)---wears(frequency)_______ [facility] ___ CPAP___ BiPap___   N/A____      Home O2 Use :  No    If No for home O2---if presently on O2 during hospitalization:  Yes  if yes CM to follow for potential DC O2 need  Informed of need for care provider to bring portable home O2 tank on day of discharge for nursing to connect prior to leaving:   Not Indicated  Verbalized agreement/Understanding:   Not Indicated    Community Service Affiliation  Dialysis:  No    · Agency:  · Location:  · Dialysis Schedule:  · Phone:   · Fax: Other Community Services: (ex:PT/OT,Mental Health,Wound Clinic, Cardio/Pul [address])    DISCHARGE PLAN: Explained Case Management role/services. CM reviewed chart and spoke with pt. Pt reports to live at home with his mother and dtr who provide 24/7 care for him. Pt has been active with [facility] for a hha, however, this has been on hold due to no available staff to provide care at this time. Pt cont to be active with [facility] for SN and with [facility] every other Friday. Pt states that he plans to return home at discharge with current services. Will follow.

## 2022-02-02 NOTE — FLOWSHEET NOTE
02/02/22 1817   Wound 11/05/21 # 81, mid-back, Surgical, full thickness, onset June 2015   Date First Assessed/Time First Assessed: 11/05/21 1133   Primary Wound Type: Surgical Type  Wound Description (Comments): # 81, mid-back, Surgical, full thickness, onset June 2015   Dressing Status New dressing applied   Wound Cleansed Cleansed with saline   Dressing/Treatment Collagen with Ag; Foam   Wound 01/31/22 Coccyx Large open area present to coccyx and buttocks area. Non-blanchable redness. Date First Assessed/Time First Assessed: 01/31/22 0030   Present on Hospital Admission: Yes  Primary Wound Type: Pressure Injury  Location: Coccyx  Wound Description (Comments): Large open area present to coccyx and buttocks area. Non-blanchable redn. .. Dressing Status New dressing applied   Wound Cleansed Cleansed with saline   Dressing/Treatment Foam   Wound Assessment Dry;Erythema   Drainage Amount Scant   Drainage Description Serosanguinous   Odor Malodorous/putrid   [place]-wound Assessment Non-blanchable erythema   Wound care completed at this time. Patient's bed was changed at this time.

## 2022-02-02 NOTE — PROGRESS NOTES
Potassium came back at 2.94 this morning. Lasix gtt paused, and PRN potassium replacement initiated.

## 2022-02-02 NOTE — FLOWSHEET NOTE
02/02/22 0750   Vital Signs   Temp 98.4 °F (36.9 °C)   Temp Source Oral   Pulse 85   Heart Rate Source Monitor   Resp 18   /83   BP Location Right upper arm   Patient Position Semi fowlers   Level of Consciousness Alert (0)   MEWS Score 1   Patient Currently in Pain Denies   Oxygen Therapy   SpO2 97 %   Pulse Oximeter Device Mode Intermittent   Pulse Oximeter Device Location Finger   O2 Device Nasal cannula   O2 Flow Rate (L/min) 2 L/min   Patient is resting showing no s/s of distress. Patient is alert and oriented. Meds were given, see MAR. Patient is denying any needs. Bed is in lowest position and call light is within reach. Will continue to monitor. Shift assessment complete, see flowsheets. Lasix gtt is off for potassium replacement.

## 2022-02-02 NOTE — PROGRESS NOTES
[facility] Progress Note           Subjective/   49y.o. year old male who we are seeing in consultation for A/CKD-3. HPI:  Renal function stable with diuresis, urine output of 2.7 liters over last 24 hours. Remains short of breath, oxygen being weaned, down to 1-2 L NC.    ROS:  Intake adequate, +weak. Objective/   GEN:  Chronically ill, /83   Pulse 85   Temp 98 °F (36.7 °C) (Oral)   Resp 18   Ht 6' 2\" (1.88 m)   Wt 224 lb 1 oz (101.6 kg)   SpO2 98%   BMI 28.77 kg/m²   HEENT: non-icteric, no JVD  CV: S1, S2 without m/r/g; ++ LE edema  RESP: CTA B without w/r/r; breathing wnl  ABD: +bs, soft, nt, no hsm  SKIN: warm, no rashes    Data/  Recent Labs     01/31/22 0233 02/01/22 0445 02/02/22  0500   WBC 7.7 8.1 7.2   HGB 11.5* 11.6* 11.8*   HCT 36.4* 36.0* 37.2*   MCV 76.2* 76.1* 76.4*    see below 187     Recent Labs     01/31/22 0233 01/31/22 0233 02/01/22 0445 02/01/22  0445 02/01/22  1315 02/01/22  1620 02/02/22  0500   *  --  137  --   --   --  137   K 3.7   < > 2.8*   < > 3.4* 3.6 2.9*   CL 95*  --  94*  --   --   --  95*   CO2 27  --  29  --   --   --  30   GLUCOSE 153*  --  65*  --   --   --  117*   PHOS 3.8  --  3.5  --   --   --  3.9   MG 2.30  --   --   --   --   --  2.20   BUN 80*  --  79*  --   --   --  79*   CREATININE 1.4*  --  1.3  --   --   --  1.4*   LABGLOM 53*  --  57*  --   --   --  53*   GFRAA >60  --  >60  --   --   --  >60    < > = values in this interval not displayed.        Assessment/     Acute Kidney Injury:  KDIGO stag 1  - Etiology:  Decompensated heart failure with tense abdominal wall edema and increased intra-abdominal pressure causing decreased renal blood flow and responsiveness to diuretics     Chronic Kidney Disease:  Stage 3  - Multiple episodes of SUDHA, previously needing HD as outpatient  - Last Scr was 1 but BUN 61     Hyponatremia- Hypervolemic   - Responding to diuresis     -CHF with reduced EF Decompensated but respiratory status is ok     -Chronic dependent lymphedema in the setting of paraplegia     -Neurogenic bladder, history of self-catheterization every 4 hours              Now with langley    Plan/     - Trial of lasix gtt at 20 mg/hr with prn metolazone - holding with low K levels until K 3.6 or higher  - Trend labs, bp's, & urine output    ____________________________________  Zachariah Mckeon MD  [facility]  [email or web address]  Office: [phone]

## 2022-02-02 NOTE — FLOWSHEET NOTE
02/02/22 1417   Vital Signs   Temp 97.8 °F (36.6 °C)   Temp Source Oral   Pulse 78   Heart Rate Source Monitor   Resp 20   /70   BP Location Left upper arm   Patient Position Semi fowlers   Level of Consciousness Alert (0)   MEWS Score 1   Patient Currently in Pain Denies   Oxygen Therapy   SpO2 96 %   Pulse Oximeter Device Mode Intermittent   Pulse Oximeter Device Location Finger   O2 Device Nasal cannula   O2 Flow Rate (L/min) 2 L/min   Patient resting. Family at bedside. K 4.2.  Lasix gtt will be restarted per Dr. Malia Pinon

## 2022-02-02 NOTE — PROGRESS NOTES
Physician Progress Note      PATIENT:               Andreas Lopez  CSN #:                  [id number]  :                       1965  ADMIT DATE:       2022 12:16 PM  [address] DATE:  RESPONDING  PROVIDER #:        Shamar Nelson MD          QUERY TEXT:    Patient admitted with HHD. Per wound care, noted to also have Right Knee   Pressure Injury Stage 2 and Left lower leg pressure stage 4. If possible,   please document in progress notes and discharge summary the location, present   on admission status and stage of the pressure ulcer: The medical record reflects the following:  Risk Factors: noted Chronic wounds - right knee, LLE, sacrum  Clinical Indicators:  wound care - Right Knee Cluster, Pressure Injury,   Stage 2, Left lower leg, pressure, stage 4, Coccyx Large open area present to   coccyx and buttocks area. Non-blanchable redness  Treatment:  Fluid Immersion bed, dressing changes with collagen and mepilex   border, wound care consult    Thank You Ada Barrientos RN, [email or web address]    Stage 1:  Non-blanchable erythema of intact skin  Stage 2:  Abrasion, Blister, Partial-thickness skin loss, with exposed dermis  Stage 3:  Full-thickness skin loss with damage or necrosis of subcutaneous   tissue  Stage 4:  Full-thickness skin & soft tissue loss through to underlying muscle,   tendon or bone  Unstageable: Obscured full-thickness skin & tissue loss  Options provided:  -- Right Knee Pressure Injury Stage 2 and Left lower leg pressure stage 4   present on admission  -- Other - I will add my own diagnosis  -- Disagree - Not applicable / Not valid  -- Disagree - Clinically unable to determine / Unknown  -- Refer to Clinical Documentation Reviewer    PROVIDER RESPONSE TEXT:    This patient has a Right Knee Pressure Injury Stage 2 and Left lower leg   pressure stage 4 which was present on admission.     Query created by: Balbir Mayers on 2022 7:35 AM      Electronically signed by: Sadi Vasquez MD 2/2/2022 10:19 AM

## 2022-02-03 NOTE — PROGRESS NOTES
Shift assessment complete, morning medications given, patient resting with no complaints of pain, langley emptied, repositioned, will continue to monitor.  Paco Hutton RN

## 2022-02-03 NOTE — PROGRESS NOTES
Shift assessment complete, scheduled meds given call light and bedside table in reach. Will continue to monitor.

## 2022-02-03 NOTE — PROGRESS NOTES
[facility] Progress Note           Subjective/   49y.o. year old male who we are seeing in consultation for A/CKD-3. HPI:  Renal function stable with diuresis, urine output of 2 liters over last 24 hours. Remains short of breath, oxygen being weaned, down to 1-2 L NC, & edematous. ROS:  Intake adequate, +weak. Objective/   GEN:  Chronically ill, BP 94/65   Pulse 77   Temp 95.8 °F (35.4 °C) (Axillary)   Resp 18   Ht 6' 2\" (1.88 m)   Wt 228 lb (103.4 kg)   SpO2 95%   BMI 29.27 kg/m²   HEENT: non-icteric, no JVD  CV: S1, S2 without m/r/g; ++ LE edema  RESP: CTA B without w/r/r; breathing wnl  ABD: +bs, soft, nt, no hsm  SKIN: warm, no rashes    Data/  Recent Labs     02/01/22  0445 02/02/22  0500 02/03/22  0450   WBC 8.1 7.2 7.1   HGB 11.6* 11.8* 10.8*   HCT 36.0* 37.2* 34.6*   MCV 76.1* 76.4* 76.2*   PLT see below 187 202     Recent Labs     02/01/22  0445 02/01/22  1315 02/02/22  0500 02/02/22  1355 02/03/22  0450     --  137  --  136   K 2.8*   < > 2.9* 4.2 2.8*   CL 94*  --  95*  --  93*   CO2 29  --  30  --  31   GLUCOSE 65*  --  117*  --  165*   PHOS 3.5  --  3.9  --  3.5   MG  --   --  2.20  --   --    BUN 79*  --  79*  --  81*   CREATININE 1.3  --  1.4*  --  1.3   LABGLOM 57*  --  53*  --  57*   GFRAA >60  --  >60  --  >60    < > = values in this interval not displayed.        Assessment/     Acute Kidney Injury:  KDIGO stag 1  - Etiology:  Decompensated heart failure with tense abdominal wall edema and increased intra-abdominal pressure causing decreased renal blood flow and responsiveness to diuretics     Chronic Kidney Disease:  Stage 3  - Multiple episodes of SUDHA, previously needing HD as outpatient  - Last Scr was 1 but BUN 61     Hyponatremia- Hypervolemic   - Responding to diuresis     -CHF with reduced EF              Decompensated but respiratory status is ok     -Chronic dependent lymphedema in the setting of paraplegia     -Neurogenic bladder, history of self-catheterization every 4 hours              SGE with shivam    -Hypokalemia - prn replacement per protocol    Plan/     - Monitor off of lasix drip today - transition to oral loop diuretic in AM  - Agree with addition of aldactone with low K issues, titrated today  - Trend labs, bp's, & urine output    ____________________________________  Sherice Johnson MD  [facility]  [email or web address]  Office: [phone]
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
## 2022-02-03 NOTE — CONSULTS
[facility] Wound Ostomy Continence Nurse  Follow-up Progress Note       NAME:  [address] RECORD NUMBER:  [id number]  AGE:  49 y.o. GENDER:  male  :  1965  TODAY'S DATE:  2/3/2022    Subjective: Pt alert, oriented and pleasant. Wound Identification:  Wound Type: pressure and friction  Contributing Factors: edema, venous stasis, lymphedema, chronic pressure and decreased mobility        Patient Goal of Care:  [] Wound Healing  [] Odor Control  [] Palliative Care  [] Pain Control   [] Other:     Objective:    /79   Pulse 86   Temp 97.2 °F (36.2 °C) (Oral)   Resp 18   Ht 6' 2\" (1.88 m)   Wt 228 lb (103.4 kg)   SpO2 96%   BMI 29.27 kg/m²   Ismael Risk Score: Ismael Scale Score: 10  Assessment:   Measurements:  Wound 21 #72, Right Knee Cluster, Pressure Injury, Stage 2, Onset 21 (Active)   Wound Image   22 1019   Wound Etiology Pressure Stage  2 22 0931   Dressing Status Clean;Dry; Intact 2231   Wound Cleansed Vashe 22 0931   Dressing/Treatment Other (comment) 22 1255   Dressing Change Due 22 0931   Wound Length (cm) 6 cm 22 1019   Wound Width (cm) 10 cm 22 1019   Wound Depth (cm) 0.1 cm 22 1019   Wound Surface Area (cm^2) 60 cm^2 22 1019   Change in Wound Size % (l*w) -30226.11 22 1019   Wound Volume (cm^3) 6 cm^3 22 1019   Wound Healing % -45604 22 1019   Distance Tunneling (cm) 0 cm 2231   Undermining Maxium Distance (cm) 0 22 0931   Wound Assessment Other (Comment) 2231   Drainage Amount Small 22   Drainage Description Serosanguinous 22   Odor None 22   Chetna-wound Assessment Intact 02/03/22 0931   Number of days: 223       Wound 21 #80, Left lower leg, pressure, stage 4, onset 2021 (Active)   Wound Image   22 1122   Wound Etiology Pressure Stage  4 22 0931   Dressing Status Clean;Dry; Intact 02/03/22 0931   Wound Cleansed Vashe 02/03/22 0931   Offloading for Diabetic Foot Ulcers Offloading boot 02/03/22 0931   Dressing Change Due 01/01/22 02/03/22 0931   Wound Length (cm) 1.5 cm 01/21/22 1019   Wound Width (cm) 0.6 cm 01/21/22 1019   Wound Depth (cm) 0.1 cm 01/21/22 1019   Wound Surface Area (cm^2) 0.9 cm^2 01/21/22 1019   Change in Wound Size % (l*w) -650 01/21/22 1019   Wound Volume (cm^3) 0.09 cm^3 01/21/22 1019   Wound Healing % -650 01/21/22 1019   Distance Tunneling (cm) 0 cm 02/03/22 0931   Undermining Maxium Distance (cm) 0 02/03/22 0931   Wound Assessment Other (Comment) 02/03/22 0931   Drainage Amount Scant 02/03/22 0931   Drainage Description Serosanguinous 02/03/22 0931   Odor None 02/03/22 0931   Chetna-wound Assessment Intact 02/03/22 0931   Number of days: 90       Wound 11/05/21 # 81, mid-back, Surgical, full thickness, onset June 2015 (Active)   Wound Image   01/07/22 1122   Wound Etiology Surgical 02/03/22 0931   Dressing Status New dressing applied 02/03/22 0931   Wound Cleansed Cleansed with saline 02/03/22 0931   Dressing/Treatment Collagen with Ag; Foam 02/03/22 0931   Dressing Change Due 01/01/22 02/03/22 0931   Wound Length (cm) 9.2 cm 01/21/22 1019   Wound Width (cm) 6.5 cm 01/21/22 1019   Wound Depth (cm) 0.2 cm 01/21/22 1019   Wound Surface Area (cm^2) 59.8 cm^2 01/21/22 1019   Change in Wound Size % (l*w) -44.86 01/21/22 1019   Wound Volume (cm^3) 11.96 cm^3 01/21/22 1019   Wound Healing % 52 01/21/22 1019   Distance Tunneling (cm) 0 cm 02/03/22 0931   Undermining Maxium Distance (cm) 0 02/03/22 0931   Wound Assessment Exposed hardware;Pink/red;Bleeding 02/03/22 0931   Drainage Amount Large 02/03/22 0931   Drainage Description Sanguinous 02/03/22 0931   Odor Malodorous/putrid 02/03/22 0931   Chetna-wound Assessment Blanchable erythema 02/03/22 0931   Number of days: 90       Wound 01/31/22 Coccyx Large open area present to coccyx and buttocks area. Non-blanchable redness. (Active)   Dressing Status Clean;Dry; Intact 02/03/22 0931   Wound Cleansed Cleansed with saline 02/03/22 0931   Dressing/Treatment Foam 02/03/22 0931   Wound Assessment Dry;Erythema 02/03/22 0931   Drainage Amount Scant 02/03/22 0931   Drainage Description Serosanguinous 02/03/22 0931   Odor Malodorous/putrid 02/03/22 0931   Chetna-wound Assessment Non-blanchable erythema 02/03/22 0931   Number of days: 3   right knee cluster:  Scattered dry black scbas, partial thickness and full thickness skin loss. chronic    2nd toe, great toe, left foot:  Black dry stable scabs. Left latyeral LE:  Dry black scab, surrounding tissue with purple linear discoloration along leg. unchaged from in the past.  No soi        Response to treatment:  Well tolerated by patient. Pain Assessment:  Severity:  0 / 10  Quality of pain: N/A  Wound Pain Timing/Severity: none  Premedicated: N/A  Plan: BLE to be changed once weekly by [facility] [place]  RLE:  Old dressings removed. Compression stockings both soiled with bloody drainage. Hand washed in sink and hung in BR to dry. Can be reapplied after dry. Discussed with staff rn. LLE:  Collagen appled with foam.  Dermafit stocking applied. Betadine applied to black areas on toes  RLE:  Right knee cleansed with NS, patted dry. Xerofrm applied with foam pad. Hen cast padding and kerlix roll gauze over stump up past knee, then pt's own compression stocking applied. Plan of Care: Wound 11/05/21 #80, Left lower leg, pressure, stage 4, onset 11/02/2021-Dressing/Treatment:  (collagen,mepilex border)  Wound 11/05/21 # 81, mid-back, Surgical, full thickness, onset June 2015-Dressing/Treatment: Collagen with Ag,Foam  Wound 06/25/21 #72, Right Knee Cluster, Pressure Injury, Stage 2, Onset 6/22/21-Dressing/Treatment:  (xeroform,foam,cast paddingx2,kerlix,stockingette)  Wound 01/31/22 Coccyx Large open area present to coccyx and buttocks area.   Non-blanchable redness.-Dressing/Treatment: Foam    Specialty Bed Required : Yes   [] Low Air Loss   [] Pressure Redistribution  [x] Fluid Immersion  [] Bariatric  [] Total Pressure Relief  [] Other:     Current Diet: ADULT DIET; Regular; 4 carb choices (60 gm/meal); 1500 ml  ADULT ORAL NUTRITION SUPPLEMENT; Breakfast, Dinner;  Low Calorie/High Protein Oral Supplement  Dietician consult:  Yes    Discharge Plan:  Placement for patient upon discharge: home with support   Patient appropriate for Outpatient [address]: Yes followed by Dr Rebecca Evans    Referrals:  []   [x] [address] [place] [address]  [x] Supplies  [] Other    Patient/Caregiver Teaching:  Level of patient/caregiver understanding able to:   [] Indicates understanding       [] Needs reinforcement  [] Unsuccessful      [x] Verbal Understanding  [] Demonstrated understanding       [] No evidence of learning  [] Refused teaching         [] N/A       Electronically signed by Alexandro Mcfarland RN, CWOCN on 2/3/2022 at 2:26 PM

## 2022-02-03 NOTE — FLOWSHEET NOTE
02/03/22 0136   Vital Signs   Temp 98.4 °F (36.9 °C)   Temp Source Axillary   Pulse 77   Heart Rate Source Monitor   Resp 18   /69   BP Location Left upper arm   Patient Position Semi fowlers   Level of Consciousness Alert (0)   MEWS Score 1   Patient Currently in Pain Denies   Oxygen Therapy   SpO2 94 %   O2 Device Nasal cannula   Skin Assessment Clean, dry, & intact   Height and Weight   Weight 228 lb (103.4 kg)   Weight Method Bed scale   BMI (Calculated) 29.3   vss. Pt resting quietly in bed. Call light and bedside table in reach. Pt denies further needs at this time. Will continue to monitor.

## 2022-02-03 NOTE — PROGRESS NOTES
Admit: 2022    Name:  Alexandre Tom  Room:  /2777-07  MRN:    [id number]     Daily Progress Note for 2/3/2022   Admitted with acute on chronic systolic congestive heart failure    Interval History:     Potassium is low today. Lasix drip had to be stopped until potassium was corrected. Aldactone added     Scheduled Meds:   [START ON 2022] spironolactone  50 mg Oral Daily    insulin glargine  25 Units SubCUTAneous BID    ciprofloxacin  500 mg Oral 2 times per day    hydrALAZINE  10 mg Oral BID    menthol-zinc oxide   Topical BID    apixaban  5 mg Oral BID    aspirin  81 mg Oral Nightly    cetirizine  10 mg Oral Daily    docusate sodium  100 mg Oral BID    ferrous sulfate  325 mg Oral Daily with breakfast    budesonide-formoterol  2 puff Inhalation BID    metoprolol succinate  25 mg Oral BID    montelukast  10 mg Oral Daily    pantoprazole  40 mg Oral QAM AC    polyethylene glycol  17 g Oral Daily    senna  2 tablet Oral Nightly    traZODone  50 mg Oral Nightly    insulin lispro  0-12 Units SubCUTAneous TID WC    insulin lispro  0-6 Units SubCUTAneous Nightly    sodium chloride flush  5-40 mL IntraVENous 2 times per day    magnesium oxide  800 mg Oral BID       Continuous Infusions:   dextrose Stopped (22 1134)    sodium chloride 100 mL/hr at 22 1244       PRN Meds:  potassium chloride, magnesium sulfate, albuterol, glucose, glucagon (rDNA), dextrose, dextrose bolus (hypoglycemia) **OR** dextrose bolus (hypoglycemia), albuterol, cyclobenzaprine, sodium chloride flush, sodium chloride, ondansetron **OR** ondansetron, acetaminophen **OR** acetaminophen                  Objective:     Temp  Av.4 °F (36.3 °C)  Min: 95.8 °F (35.4 °C)  Max: 98.4 °F (36.9 °C)  Pulse  Av  Min: 77  Max: 86  BP  Min: 94/65  Max: 120/69  SpO2  Av.8 %  Min: 93 %  Max: 96 %  No data found.       Intake/Output Summary (Last 24 hours) at 2/3/2022 1421  Last data filed at 2/3/2022 1303  Gross per 24 hour   Intake 994.89 ml   Output 3105 ml   Net -2110.11 ml       Physical Exam:  Gen: No distress. Alert. Eyes: PERRL. No sclera icterus. No conjunctival injection. ENT: No discharge. Pharynx clear. Neck: Trachea midline. Normal thyroid. Resp: No accessory muscle use. No crackles. No wheezes. No rhonchi. No dullness on percussion. CV: Regular rate. Regular rhythm. No murmur or rub. 3 + edema. Extending to the lower abdominal wall  GI: Non-tender. Non-distended. No masses. No organomegaly. Normal bowel sounds. No hernia. Left lower quadrant ostomy bag  Skin: Warm and dry. No nodule on exposed extremities. No rash on exposed extremities. Lymph: No cervical LAD. No supraclavicular LAD. M/S: Right BKA  Neuro: Paraplegia   Psych: Oriented x 3. No anxiety or agitation. Lab Data:  CBC:   Recent Labs     02/01/22 0445 02/02/22  0500 02/03/22  0450   WBC 8.1 7.2 7.1   RBC 4.73 4.87 4.54   HGB 11.6* 11.8* 10.8*   HCT 36.0* 37.2* 34.6*   MCV 76.1* 76.4* 76.2*   RDW 19.7* 19.8* 19.6*   PLT see below 187 202     BMP:   Recent Labs     02/01/22  0445 02/01/22  1315 02/02/22  0500 02/02/22  1355 02/03/22  0450     --  137  --  136   K 2.8*   < > 2.9* 4.2 2.8*   CL 94*  --  95*  --  93*   CO2 29  --  30  --  31   PHOS 3.5  --  3.9  --  3.5   BUN 79*  --  79*  --  81*   CREATININE 1.3  --  1.4*  --  1.3    < > = values in this interval not displayed. BNP: No results for input(s): BNP in the last 72 hours. PT/INR: No results for input(s): PROTIME, INR in the last 72 hours. APTT:No results for input(s): APTT in the last 72 hours. CARDIAC ENZYMES:   No results for input(s): CKMB, CKMBINDEX, TROPONINI in the last 72 hours.     Invalid input(s): CKTOTAL;3  FASTING LIPID PANEL:  Lab Results   Component Value Date    CHOL 80 01/07/2022    HDL 30 01/07/2022    HDL 38 10/04/2011    TRIG 154 01/07/2022     LIVER PROFILE:   No results for input(s): AST, ALT, ALB, BILIDIR, BILITOT, ALKPHOS in the last 72 hours.       XR CHEST PORTABLE   Final Result   Increasing right basilar pleural and parenchymal disease consistent with   atelectasis or pneumonia and a small pleural effusion      Improved left basilar subsegmental atelectasis               Assessment & Plan:     Patient Active Problem List    Diagnosis Date Noted    Fluid overload 01/30/2022    Stage 3a chronic kidney disease ([address]     Pressure ulcer of left leg, stage 4 ([address] 12/08/2021    S/P BKA (below knee amputation) unilateral, right ([address] 10/29/2021    Paroxysmal atrial fibrillation ([address] 10/29/2021    Acute on chronic renal insufficiency     Acute on chronic combined systolic and diastolic CHF (congestive heart failure) ([address] 09/05/2021    Ulcer of right knee, limited to breakdown of skin ([address] 08/03/2021    Hyponatremia     Choledocholithiasis     SUDHA (acute kidney injury) ([address]     Moderate persistent asthma without complication 16/63/3554    LIBORIO on CPAP     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 Onychomycosis 07/10/2017    Dystrophic nail 07/10/2017    Dehiscence of surgical wound of T-spine 02/16/2017    Hypergranulation 07/31/2016    Iron deficiency anemia due to chronic blood loss 07/09/2015    Lymphedema of both lower extremities 07/09/2015    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 ([address] 06/18/2015    Chronic back pain 08/20/2014    Arthritis 08/20/2014    Paraplegia, complete ([address] 03/10/2014    Colostomy in place [facility]) 03/10/2014    Neurogenic bladder 10/10/2013    Neurogenic bowel 10/10/2013    Mixed hyperlipidemia 02/22/2010    Coronary artery disease involving native coronary artery of native heart without angina pectoris 02/22/2010       #Acute on chronic systolic CHF  #Ischemic cardiomyopathy   #Anasarca  - EF 25-30% on most recent limited echo 1/10/22  - prior to admission he was taking Demadex 100 mg daily and metolazone 5 mg daily  - seen by nephro- now on Lasix gtt. Monitor weights, UOP, and BMP  - cont toprol XL   - no ACEi/ARB or aldactone with renal issues  - hydralazine recently added by EP- continued  Hold lasix drip temporarily until his potassium levels get better. lasix drip restarted   Add Aldactone 25 mg daily--> increase to 50 mg daily      #Hypoxia  - suspect 2/2 fluid overload  - currently on 2L - wean as able   - diurese as above, start IS, wean O2      #CKD3a  - f/b Dr. Digna Ridley  - Baseline Crt 1.0  - Crt 1.4 on admit--> 1.3 --1.4-- 1.3   - nephro c/s  - monitor BMP daily   - not meeting SUDHA criteria     #Hyponatremia  - improving with diuresis      #Neurogenic bladder  - practices ISC at home, now with langley cath in place for fluid management   - UA sent on admit with WBC, urine culture e.coli   Continue oral cipro    #CAD  - with history of PCI  - he denies chest pain now   - elevated troponin noted- chronic on review of past labs  - cont ASA, statin, toprol      #History of PE  - cont Eliquis 5 mg BID      #COPD  - no AE  - cont inhalers      #DM2  - cont Lantus and use SSI  Decrease dose of Lantus given hypoglycemia this morning     #Right BKA     #Chronic wounds  - right knee, LLE, sacrum. F/b WCC. Wound RN c/s     #Gitelman syndrome   - home magnesium continued      #Chronic anemia  - Hb stable   - cont PO iron     DVT Prophylaxis: Eliquis   Diet: ADULT DIET;  Regular; 4 carb choices (60 gm/meal); 1500 ml  Code Status: Full Code      Marylen Reamer, MD

## 2022-02-03 NOTE — PROGRESS NOTES
Perfect serve sent to Dr. Carlito Hernández regarding panic labs for k of 2.8 and BUN of 81. Awaiting new orders. Will continue to monitor.

## 2022-02-04 NOTE — CARE COORDINATION
INTERDISCIPLINARY PLAN OF CARE CONFERENCE    Date/Time: 2/4/2022 2:44 PM  Completed by: Gerard Johnson RN, Case Management      Patient Name:  Lien Graves  YOB: 1965  Admitting Diagnosis: SUDHA (acute kidney injury) ([facility] [N17.9]  Fluid overload [E87.70]  Acute on chronic combined systolic and diastolic CHF (congestive heart failure) ([facility] [I50.43]  Anasarca associated with disorder of kidney [N04.9]  Decreased urine output [R34]     Admit Date/Time:  1/30/2022 12:16 PM    Chart reviewed. Interdisciplinary team contacted or reviewed plan related to patient progress and discharge plans. Disciplines included Case Management, Nursing, and Dietitian. Current Status:ongoing  PT/OT recommendation for discharge plan of care: n/a    Expected D/C Disposition:  Home  Confirmed plan with patient and/or family Yes confirmed with: (name) pt  Met with:pt  Discharge Plan Comments: Reviewed chart. Role of discharge planner explained and patient verbalized understanding. Pt is from [facility] home with his mother and daughter and plans to return. Family provides care for pt. Pt is paraplegic. Pt is active with [facility] [place]. for SN. Pt is active with Waiver Program, but unable to find Aides to help pt. Pt is active with Dr. Jasmeet Juares with [place].     [facility] to [facility] him home, as they transport him to his [address],       Home O2 in place on admit: No  Pt informed of need to bring portable home O2 tank on day of discharge for nursing to connect prior to leaving:  Not Indicated  Verbalized agreement/Understanding:  Not Indicated

## 2022-02-04 NOTE — PROGRESS NOTES
[facility] Progress Note           Subjective/   49y.o. year old male who we are seeing in consultation for A/CKD-3. HPI:  Renal function stable with diuresis, urine output of 2.5 liters over last 24 hours. Remains short of breath, oxygen being weaned, down to 1-2 L NC, & edematous. ROS:  Intake adequate, +weak. Objective/   GEN:  Chronically ill, /65   Pulse 80   Temp 97.2 °F (36.2 °C) (Oral)   Resp 18   Ht 6' 2\" (1.88 m)   Wt 231 lb 1.6 oz (104.8 kg)   SpO2 96%   BMI 29.67 kg/m²   HEENT: non-icteric, no JVD  CV: S1, S2 without m/r/g; ++ LE edema  RESP: CTA B without w/r/r; breathing wnl  ABD: +bs, soft, nt, no hsm  SKIN: warm, no rashes    Data/  Recent Labs     02/02/22  0500 02/03/22  0450 02/04/22  0515   WBC 7.2 7.1 6.9   HGB 11.8* 10.8* 10.7*   HCT 37.2* 34.6* 33.3*   MCV 76.4* 76.2* 75.9*    202 188     Recent Labs     02/02/22  0500 02/02/22  1355 02/03/22  0450 02/03/22  1755 02/04/22  0515     --  136 135* 134*   K 2.9*   < > 2.8* 3.7 3.2*   CL 95*  --  93* 93* 92*   CO2 30  --  31 31 32   GLUCOSE 117*  --  165* 277* 181*   PHOS 3.9  --  3.5  --  3.2   MG 2.20  --   --   --   --    BUN 79*  --  81* 82* 84*   CREATININE 1.4*  --  1.3 1.3 1.2   LABGLOM 53*  --  57* 57* >60   GFRAA >60  --  >60 >60 >60    < > = values in this interval not displayed.        Assessment/     Acute Kidney Injury:  KDIGO stag 1  - Etiology:  Decompensated heart failure with tense abdominal wall edema and increased intra-abdominal pressure causing decreased renal blood flow and responsiveness to diuretics     Chronic Kidney Disease:  Stage 3  - Multiple episodes of SUDHA, previously needing HD as outpatient  - Last Scr was 1 but BUN 61     Hyponatremia- Hypervolemic   - Responding to diuresis     -CHF with reduced EF              Decompensated but respiratory status is ok     -Chronic dependent lymphedema in the setting of paraplegia     -Neurogenic bladder, history of self-catheterization every 4 hours              [facility] with langley    -Hypokalemia - prn replacement per protocol    Plan/     - Restart lasix drip today per patient request - transition to oral loop diuretic shortly  - Agree with addition of aldactone with low K issues, titrated today  - Trend labs, bp's, & urine output    ____________________________________  Antoine Terry MD  [facility]  [email or web address]  Office: [phone]

## 2022-02-04 NOTE — FLOWSHEET NOTE
02/04/22 0904   Vital Signs   Temp 97.2 °F (36.2 °C)   Temp Source Oral   Pulse 80   Heart Rate Source Monitor   Resp 18   /65   BP Location Left upper arm   Patient Position Semi fowlers   Level of Consciousness Alert (0)   MEWS Score 1   Oxygen Therapy   SpO2 96 %   Pulse Oximeter Device Mode Intermittent   Pulse Oximeter Device Location Finger   O2 Device Nasal cannula   Skin Assessment Clean, dry, & intact   O2 Flow Rate (L/min) 2 L/min     Shift assessment complete - see flow sheet, K 3.2 = IV K 40mEQ administered, A&O x4, complaints of feeling like he is SOB sats are ok discussed with MD - see orders, fall precautions in place, pt. Denies needs, call light within reach.

## 2022-02-04 NOTE — FLOWSHEET NOTE
Shift assessment complete. See flowsheet for full assessment. Pt denies any needs, call light within reach.        02/03/22 2005   Vital Signs   Temp 97.7 °F (36.5 °C)   Temp Source Oral   Pulse 87   Heart Rate Source Monitor   Resp 18   /77   BP Location Right lower arm   Patient Position Semi fowlers   Level of Consciousness Alert (0)   MEWS Score 1   Patient Currently in Pain Yes   BP Upper/Lower Lower   Pain Assessment   Pain Assessment 0-10   Pain Level 6   Pain Location Head   Pain Type Acute pain   Pain Descriptors Aching   Pain Frequency Continuous   Oxygen Therapy   SpO2 95 %   O2 Device Nasal cannula   O2 Flow Rate (L/min) 2 L/min

## 2022-02-04 NOTE — PROGRESS NOTES
RT Inhaler-Nebulizer Bronchodilator Protocol Note    There is a bronchodilator order in the chart from a provider indicating to follow the RT Bronchodilator Protocol and there is an Initiate RT Inhaler-Nebulizer Bronchodilator Protocol order as well (see protocol at bottom of note). CXR Findings:  No results found. The findings from the last RT Protocol Assessment were as follows:   History Pulmonary Disease: Chronic pulmonary disease  Respiratory Pattern: Regular pattern and RR 12-20 bpm  Breath Sounds: Clear breath sounds  Cough: Strong, spontaneous, non-productive  Indication for Bronchodilator Therapy: On home bronchodilators  Bronchodilator Assessment Score: 2    Aerosolized bronchodilator medication orders have been revised according to the RT Inhaler-Nebulizer Bronchodilator Protocol below. Respiratory Therapist to perform RT Therapy Protocol Assessment initially then follow the protocol. Repeat RT Therapy Protocol Assessment PRN for score 0-3 or on second treatment, BID, and PRN for scores above 3. No Indications - adjust the frequency to every 6 hours PRN wheezing or bronchospasm, if no treatments needed after 48 hours then discontinue using Per Protocol order mode. If indication present, adjust the RT bronchodilator orders based on the Bronchodilator Assessment Score as indicated below. Use Inhaler orders unless patient has one or more of the following: on home nebulizer, not able to hold breath for 10 seconds, is not alert and oriented, cannot activate and use MDI correctly, or respiratory rate 25 breaths per minute or more, then use the equivalent nebulizer order(s) with same Frequency and PRN reasons based on the score. If a patient is on this medication at home then do not decrease Frequency below that used at home.     0-3 - enter or revise RT bronchodilator order(s) to equivalent RT Bronchodilator order with Frequency of every 4 hours PRN for wheezing or increased work of breathing using Per Protocol order mode. 4-6 - enter or revise RT Bronchodilator order(s) to two equivalent RT bronchodilator orders with one order with BID Frequency and one order with Frequency of every 4 hours PRN wheezing or increased work of breathing using Per Protocol order mode. 7-10 - enter or revise RT Bronchodilator order(s) to two equivalent RT bronchodilator orders with one order with TID Frequency and one order with Frequency of every 4 hours PRN wheezing or increased work of breathing using Per Protocol order mode. 11-13 - enter or revise RT Bronchodilator order(s) to one equivalent RT bronchodilator order with QID Frequency and an Albuterol order with Frequency of every 4 hours PRN wheezing or increased work of breathing using Per Protocol order mode. Greater than 13 - enter or revise RT Bronchodilator order(s) to one equivalent RT bronchodilator order with every 4 hours Frequency and an Albuterol order with Frequency of every 2 hours PRN wheezing or increased work of breathing using Per Protocol order mode.          Electronically signed by Evy Sher RCP on 2/4/2022 at 10:40 AM

## 2022-02-04 NOTE — FLOWSHEET NOTE
Pt appears to be resting comfortably. VSS. Per pt, no new needs at this time.         02/04/22 0511   Vital Signs   Temp 98.4 °F (36.9 °C)   Temp Source Oral   Pulse 76   Heart Rate Source Monitor   Resp 18   /73   BP Location Left upper arm   Patient Position Semi fowlers   Level of Consciousness Alert (0)   MEWS Score 1   Patient Currently in Pain Denies   Pain Assessment   Pain Assessment 0-10   Pain Level 0   Oxygen Therapy   SpO2 96 %   O2 Flow Rate (L/min) 2 L/min

## 2022-02-04 NOTE — PROGRESS NOTES
Shift report given to nurse Sky Montano at bedside. Patient care handed off in stable condition at this time.  Nancy Austin RN

## 2022-02-04 NOTE — PROGRESS NOTES
Comprehensive Nutrition Assessment    Type and Reason for Visit:  Reassess    Nutrition Recommendations/Plan:   1. Continue the Reg 4CCCdiet with 1500 ml fr  2. Continue the Ensure HP TID     Nutrition Assessment:  Pt improving from a nutritional standpoint AEB he is accepting and consuming > 75% of meals and supps and weight is going down with IV Lasix . Remains at risk for further nutritional compromise r/t chronic wounds r/t pressure d/t his paralysis . Will continue diet and supps     1500 ml daily FR and add Ensure HP with meals    Malnutrition Assessment:  Malnutrition Status: At risk for malnutrition (Comment)    Context:  Chronic Illness     Findings of the 6 clinical characteristics of malnutrition:  Energy Intake:  No significant decrease in energy intake  Weight Loss:  Unable to assess (d/t pt admitted with fluid overload + possible differences in bed scale weight/mattresses)     Body Fat Loss:  Unable to assess (difficult to assess d/t fluid accumulation)     Muscle Mass Loss:  Unable to assess (difficult to assess d/t disuse atrophy r/t paraplegia + fluid accumulation)    Fluid Accumulation:  7 - Severe  (Generalized +3 pitting; BUE +1; BLE +3-4; perineal, sacral +4)   Strength:  Not Performed    Estimated Daily Nutrient Needs:  Energy (kcal):  3907-1990 kcals based on 20-23 kcals/kg/CBW; Weight Used for Energy Requirements:  Current     Protein (g):  121-138 g protein based on 1.4-1.6 g/kg/IBW (using 86.4 kg); Weight Used for Protein Requirements:  Ideal        Fluid (ml/day):  1500 ml daily FR; Method Used for Fluid Requirements:  Other (Comment) (daily FR)      Nutrition Related Findings:  pt consumes adequate calories and protein to meet need; his pressure ulcer is chronic r/t his paralysis;  2+ edema; 100mls from colostomy; Na & K are low;  Iv Lasix leading to weight loss      Wounds:  Multiple,Pressure Injury,Stage IV,Full Thickness,Stage II (LLE stage 4 PI, mid back surgical full thickness, R knee cluster PI stage 2, large open area coccyx/buttocks- nonblanchable redness)       Current Nutrition Therapies:    ADULT DIET; Regular; 4 carb choices (60 gm/meal); 1500 ml  ADULT ORAL NUTRITION SUPPLEMENT; Breakfast, Dinner; Low Calorie/High Protein Oral Supplement    Anthropometric Measures:  · Height: 6' 2\" (188 cm)  · Current Body Weight: 231 lb (104.8 kg)   · Admission Body Weight: 274 lb 3.2 oz (124.4 kg) (obtained 1/31; actual weight, bed scale (pt admitted with fluid overload + possible differences in bed scales/mattresses))    · Usual Body Weight: 274 lb 3.2 oz (124.4 kg) (obtained 1/31; actual weight, bed scale (pt admitted with fluid overload + possible differences in bed scales/mattresses))     · Ideal Body Weight: 190 lbs; % Ideal Body Weight 117.4 %   · BMI: 29.6  · Adjusted Body Weight: 253; Amputation,Paraplegia   · Adjusted BMI: 32.4    · BMI Categories: Overweight (BMI 25.0-29. 9)       Nutrition Diagnosis:   · Increased nutrient needs related to increase demand for energy/nutrients,renal dysfunction,cardiac dysfunction as evidenced by wounds,other (comment),localized or generalized fluid accumulation,lab values (hx of paraplegia, SUDHA, acute on chronic CHF)      Nutrition Interventions:   Food and/or Nutrient Delivery:  Continue Current Diet,Continue Oral Nutrition Supplement  Nutrition Education/Counseling:  No recommendation at this time   Coordination of Nutrition Care:  Continue to monitor while inpatient    Goals:  patient will consume 75% or greater of meals on ADULT DIET;  Regular; 4 carb choices per meal x 3 meals per day + accept and consume 75% or greater of Ensure HP with meals without additional lab/fluid disturbances; patient will adhere to 1500 ml daily FR;       Nutrition Monitoring and Evaluation:   Behavioral-Environmental Outcomes:  None Identified   Food/Nutrient Intake Outcomes:  Food and Nutrient Intake,Supplement Intake  Physical Signs/Symptoms Outcomes: Biochemical Data,GI Status,Fluid Status or Edema,Weight,Nutrition Focused Physical Findings     Discharge Planning:     Too soon to determine     Electronically signed by Arin Weathers RD, LD on 2/4/22 at 6:18 PM EST    Contact: [phone]

## 2022-02-05 NOTE — FLOWSHEET NOTE
Pt appears to be resting comfortably. VSS. Per pt, no new needs at this time.         02/05/22 0255   Vital Signs   Temp 97.1 °F (36.2 °C)   Temp Source Oral   Pulse 79   Heart Rate Source Monitor   Resp 18   /78   BP Location Left upper arm   Patient Position Semi fowlers   Oxygen Therapy   SpO2 96 %   O2 Device Nasal cannula   O2 Flow Rate (L/min) 2 L/min   Height and Weight   Weight 228 lb 6.4 oz (103.6 kg)   Weight Method Bed scale   BMI (Calculated) 29.4

## 2022-02-05 NOTE — FLOWSHEET NOTE
02/05/22 0944   Vital Signs   Temp 97.5 °F (36.4 °C)   Temp Source Oral   Pulse 83   Heart Rate Source Monitor   Resp 18   /74   BP Location Left upper arm   Patient Position Semi fowlers   Level of Consciousness Alert (0)   MEWS Score 1   Patient Currently in Pain Denies   Oxygen Therapy   SpO2 95 %   Pulse Oximeter Device Mode Intermittent   Pulse Oximeter Device Location Finger   O2 Device Nasal cannula   O2 Flow Rate (L/min) 2 L/min   Patient is resting showing no s/s of distress. Patient is alert and oriented. Meds were given, see MAR. Patient is denying any needs. Bed is in lowest position and call light is within reach. Will continue to monitor. Shift assessment complete, see flowsheets.

## 2022-02-05 NOTE — FLOWSHEET NOTE
02/05/22 1856   Wound 11/05/21 # 81, mid-back, Surgical, full thickness, onset June 2015   Date First Assessed/Time First Assessed: 11/05/21 1133   Primary Wound Type: Surgical Type  Wound Description (Comments): # 81, mid-back, Surgical, full thickness, onset June 2015   Dressing Status New dressing applied   Wound Cleansed Cleansed with saline   Dressing/Treatment Foam   Wound Assessment Exposed hardware;Pink/red;Bleeding   Drainage Amount Large   Drainage Description Sanguinous   Odor Malodorous/putrid   [place]-wound Assessment Blanchable erythema   Wound 01/31/22 Coccyx Large open area present to coccyx and buttocks area. Non-blanchable redness. Date First Assessed/Time First Assessed: 01/31/22 0030   Present on Hospital Admission: Yes  Primary Wound Type: Pressure Injury  Location: Coccyx  Wound Description (Comments): Large open area present to coccyx and buttocks area. Non-blanchable redn. .. Dressing Status New dressing applied   Wound Cleansed Cleansed with saline   Dressing/Treatment Foam   Wound Assessment Dry;Erythema   Drainage Amount Scant   Drainage Description Serosanguinous   Odor Malodorous/putrid   [place]het[place]-wound Assessment Non-blanchable erythema   Wound care completed at this time with entire bed change.

## 2022-02-05 NOTE — FLOWSHEET NOTE
02/05/22 1427   Vital Signs   Temp 96.8 °F (36 °C)   Temp Source Oral   Pulse 85   Heart Rate Source Monitor   Resp 18   /80   BP Location Left upper arm   Patient Position Semi fowlers   Level of Consciousness Alert (0)   MEWS Score 1   Patient Currently in Pain Denies   Oxygen Therapy   SpO2 97 %   Pulse Oximeter Device Mode Intermittent   Pulse Oximeter Device Location Finger   O2 Device Nasal cannula   O2 Flow Rate (L/min) 2 L/min   Patient resting. Denies any other needs. Will continue to monitor.

## 2022-02-05 NOTE — FLOWSHEET NOTE
Shift assessment complete. See flowsheet for full assessment. Pt denies any needs, call light within reach.        02/04/22 1917   Vital Signs   Temp 97.8 °F (36.6 °C)   Temp Source Oral   Pulse 90   Heart Rate Source Monitor   Resp 18   /79   BP Location Left upper arm   Patient Position Semi fowlers   Oxygen Therapy   SpO2 95 %   O2 Device Nasal cannula   O2 Flow Rate (L/min) 2 L/min

## 2022-02-05 NOTE — PROGRESS NOTES
[facility]side [facility] Pt currently resting in bed with the call light within reach. Pt denies any other care needs at this time. Pt stable at this time.

## 2022-02-06 NOTE — FLOWSHEET NOTE
02/06/22 1621   Pain Assessment   Pain Assessment 0-10   Pain Level 3   Patient's Stated Pain Goal No pain   Pain Location Head   Clinical Progression Gradually worsening   Pain Type Acute pain   Pain Descriptors Aching   Pain Frequency Continuous   Pain Onset Progressive   Functional Pain Assessment Activities are not prevented   Non-Pharmaceutical Pain Intervention(s) Declines   PRN acetaminophen administered for a headache. RN sent perfectserve to MD in regards to BG trending in the 200-300s.

## 2022-02-06 NOTE — FLOWSHEET NOTE
02/06/22 1334   Vital Signs   Temp 97.8 °F (36.6 °C)   Temp Source Oral   Pulse 92   Heart Rate Source Monitor   Resp 20   /76   BP Location Left upper arm   Patient Position Semi fowlers   Level of Consciousness Alert (0)   MEWS Score 1   Patient Currently in Pain Denies   Oxygen Therapy   SpO2 92 %   Pulse Oximeter Device Mode Intermittent   Pulse Oximeter Device Location Finger   O2 Device Nasal cannula   O2 Flow Rate (L/min) 1 L/min   Patient resting. Patient was repositioned. Patient denies any other needs. Will continue to monitor.

## 2022-02-06 NOTE — FLOWSHEET NOTE
Pt appears to be resting comfortably. VSS. Per pt, no new needs at this time.         02/06/22 0404   Vital Signs   Temp 97.5 °F (36.4 °C)   Temp Source Oral   Pulse 84   Heart Rate Source Monitor   Resp 18   /81   BP Location Left upper arm   Patient Position Semi fowlers   BP Upper/Lower Upper   Oxygen Therapy   SpO2 96 %   O2 Device Nasal cannula   O2 Flow Rate (L/min) 2 L/min   Height and Weight   Weight 232 lb 4 oz (105.3 kg)   Weight Method Bed scale   BMI (Calculated) 29.9

## 2022-02-06 NOTE — FLOWSHEET NOTE
Shift assessment complete. See flowsheet for full assessment. Pt denies any needs, call light within reach.        02/05/22 1941   Vital Signs   Temp 97.9 °F (36.6 °C)   Temp Source Oral   Pulse 90   Heart Rate Source Monitor   Resp 18   /82   BP Location Left upper arm   Patient Position Semi fowlers   Level of Consciousness Alert (0)   MEWS Score 1   Patient Currently in Pain Yes   Pain Assessment   Pain Assessment 0-10   Pain Level 5   Oxygen Therapy   SpO2 93 %   O2 Device Nasal cannula   O2 Flow Rate (L/min) 2 L/min

## 2022-02-06 NOTE — PROGRESS NOTES
[facility] Progress Note           Subjective/   49y.o. year old male who we are seeing in consultation for A/CKD-3. HPI:  Renal function stable with diuresis, urine output of 1.7 liters over last 24 hours. Remains short of breath, oxygen being weaned, down to 1-2 L NC. Remains edematous. ROS:  Intake adequate, +weak. Objective/   GEN:  Chronically ill, /80   Pulse 85   Temp 96.8 °F (36 °C) (Oral)   Resp 18   Ht 6' 2\" (1.88 m)   Wt 228 lb 6.4 oz (103.6 kg)   SpO2 97%   BMI 29.32 kg/m²   HEENT: non-icteric, no JVD  CV: S1, S2 without m/r/g; ++ LE edema  RESP: CTA B without w/r/r; breathing wnl  ABD: +bs, soft, nt, no hsm  SKIN: warm, no rashes    Data/  Recent Labs     02/03/22  0450 02/04/22  0515 02/05/22  0500   WBC 7.1 6.9 7.0   HGB 10.8* 10.7* 10.6*   HCT 34.6* 33.3* 33.2*   MCV 76.2* 75.9* 76.4*    188 163     Recent Labs     02/03/22  0450 02/03/22  0450 02/03/22  1755 02/04/22  0515 02/05/22  0500      < > 135* 134* 134*   K 2.8*   < > 3.7 3.2* 3.6   CL 93*   < > 93* 92* 93*   CO2 31   < > 31 32 31   GLUCOSE 165*   < > 277* 181* 238*   PHOS 3.5  --   --  3.2 3.5   MG  --   --   --   --  1.90   BUN 81*   < > 82* 84* 88*   CREATININE 1.3   < > 1.3 1.2 1.3   LABGLOM 57*   < > 57* >60 57*   GFRAA >60   < > >60 >60 >60    < > = values in this interval not displayed.        Assessment/     Acute Kidney Injury:  KDIGO stag 1  - Etiology:  Decompensated heart failure with tense abdominal wall edema and increased intra-abdominal pressure causing decreased renal blood flow and responsiveness to diuretics     Chronic Kidney Disease:  Stage 3  - Multiple episodes of SUDHA, previously needing HD as outpatient  - Last Scr was 1 but BUN 61     Hyponatremia- Hypervolemic   - Responding to diuresis     -CHF with reduced EF              Decompensated but respiratory status is ok     -Chronic dependent lymphedema in the setting of paraplegia     -Neurogenic bladder, history of self-catheterization every 4 hours              Now with langley    -Hypokalemia - prn replacement per protocol    Plan/     - Restarted lasix drip per patient request - transition to oral loop diuretic shortly  - Agree with addition of aldactone with low K issues, titrated   - Trend labs, bp's, & urine output    ____________________________________  Malena Strong MD  [facility]  [email or web address]  Office: [phone]

## 2022-02-06 NOTE — FLOWSHEET NOTE
02/06/22 0953   Vital Signs   Temp 98.2 °F (36.8 °C)   Temp Source Oral   Pulse 87   Heart Rate Source Monitor   Resp 20   /80   BP Location Left upper arm   Patient Position Semi fowlers   Level of Consciousness Alert (0)   MEWS Score 1   Patient Currently in Pain Denies   Oxygen Therapy   SpO2 97 %   Pulse Oximeter Device Mode Intermittent   Pulse Oximeter Device Location Finger   O2 Device Nasal cannula   O2 Flow Rate (L/min) 2 L/min   Patient is resting showing no s/s of distress. Patient is alert and oriented. Meds were given, see MAR. Patient is denying any needs. Bed is in lowest position and call light is within reach. Will continue to monitor. Shift assessment complete, see flowsheets.

## 2022-02-07 NOTE — FLOWSHEET NOTE
02/07/22 0954   Implanted Venous Port (Single) 09/16/21 Subclavian Right   Placement Date/Time: 09/16/21 0800   Pre-existing: Yes  Location: Subclavian  Orientation: Right   Port A Cath Status Accessed   Line Status Infusing   Dressing Type Gauze; Anti-microbial patch   Dressing Status Clean;Dry; Intact   Dressing Intervention New   Dressing Change Due 02/15/22   Port dressing change was completed at this time using sterile technique.

## 2022-02-07 NOTE — PROGRESS NOTES
Hospitalist Progress Note      PCP: Shaun Ovalle MD    Date of Admission: 1/30/2022    Subjective: diuresing well with lasix gtt    Medications:  Reviewed    Infusion Medications    furosemide (LASIX) infusion 10 mg/hr (02/06/22 1749)    dextrose Stopped (02/03/22 1134)    sodium chloride 100 mL/hr at 02/03/22 1244     Scheduled Medications    insulin glargine  33 Units SubCUTAneous BID    insulin lispro  7 Units SubCUTAneous TID WC    spironolactone  50 mg Oral Daily    ciprofloxacin  500 mg Oral 2 times per day    hydrALAZINE  10 mg Oral BID    menthol-zinc oxide   Topical BID    apixaban  5 mg Oral BID    aspirin  81 mg Oral Nightly    cetirizine  10 mg Oral Daily    docusate sodium  100 mg Oral BID    ferrous sulfate  325 mg Oral Daily with breakfast    budesonide-formoterol  2 puff Inhalation BID    metoprolol succinate  25 mg Oral BID    montelukast  10 mg Oral Daily    pantoprazole  40 mg Oral QAM AC    polyethylene glycol  17 g Oral Daily    senna  2 tablet Oral Nightly    traZODone  50 mg Oral Nightly    insulin lispro  0-12 Units SubCUTAneous TID WC    insulin lispro  0-6 Units SubCUTAneous Nightly    sodium chloride flush  5-40 mL IntraVENous 2 times per day    magnesium oxide  800 mg Oral BID     PRN Meds: potassium chloride, magnesium sulfate, albuterol, glucose, glucagon (rDNA), dextrose, dextrose bolus (hypoglycemia) **OR** dextrose bolus (hypoglycemia), albuterol, cyclobenzaprine, sodium chloride flush, sodium chloride, ondansetron **OR** ondansetron, acetaminophen **OR** acetaminophen      Intake/Output Summary (Last 24 hours) at 2/6/2022 1937  Last data filed at 2/6/2022 1700  Gross per 24 hour   Intake 222 ml   Output 2900 ml   Net -2678 ml       Physical Exam Performed:    /75   Pulse 83   Temp 96.9 °F (36.1 °C) (Oral)   Resp 20   Ht 6' 2\" (1.88 m)   Wt 232 lb 4 oz (105.3 kg)   SpO2 96%   BMI 29.82 kg/m²       Gen: No distress. Alert. Eyes: PERRL. No sclera icterus. No conjunctival injection. ENT: No discharge. Pharynx clear. Neck: Trachea midline. Normal thyroid. Resp: No accessory muscle use. No crackles. No wheezes. No rhonchi. No dullness on percussion. CV: Regular rate. Regular rhythm. No murmur or rub. 3 + edema.  Extending to the lower abdominal wall  GI: Non-tender. Non-distended. No masses. No organomegaly. Normal bowel sounds. No hernia.  Left lower quadrant ostomy bag  Skin: Warm and dry. No nodule on exposed extremities. No rash on exposed extremities. Lymph: No cervical LAD. No supraclavicular LAD. M/S: Right BKA  Neuro: Paraplegia   Psych: Oriented x 3. No anxiety or agitation. Labs:   Recent Labs     02/04/22 0515 02/05/22  0500 02/06/22 0515   WBC 6.9 7.0 6.7   HGB 10.7* 10.6* 10.5*   HCT 33.3* 33.2* 32.8*    163 147     Recent Labs     02/04/22  0515 02/05/22  0500 02/06/22 0515   * 134* 139   K 3.2* 3.6 3.7   CL 92* 93* 93*   CO2 32 31 32   BUN 84* 88* 90*   CREATININE 1.2 1.3 1.4*   CALCIUM 8.8 9.1 9.1   PHOS 3.2 3.5 3.6     No results for input(s): AST, ALT, BILIDIR, BILITOT, ALKPHOS in the last 72 hours. No results for input(s): INR in the last 72 hours. No results for input(s): Evonnie Montane in the last 72 hours.     Urinalysis:      Lab Results   Component Value Date    NITRU Negative 01/30/2022    WBCUA  01/30/2022    BACTERIA 4+ 01/30/2022    RBCUA 3-4 01/30/2022    BLOODU TRACE-INTACT 01/30/2022    SPECGRAV 1.010 01/30/2022    GLUCOSEU Negative 01/30/2022    GLUCOSEU >=1000 mg/dL 08/31/2010       Radiology:  XR CHEST PORTABLE   Final Result   Increasing right basilar pleural and parenchymal disease consistent with   atelectasis or pneumonia and a small pleural effusion      Improved left basilar subsegmental atelectasis                 Assessment/Plan:    Active Hospital Problems    Diagnosis     Fluid overload [E87.70]     Acute on chronic combined systolic and diastolic CHF (congestive heart failure) ([facility]) [I50.43]     SUDHA (acute kidney injury) ([facility] [address] [N17.9]            #Acute on chronic systolic CHF  #Ischemic cardiomyopathy   #Anasarca  - EF 25-30% on most recent limited echo 1/10/22  - prior to admission he was taking Demadex 100 mg daily and metolazone 5 mg daily  - seen by nephro- now on Lasix gtt.  Monitor weights, UOP, and BMP  - cont toprol XL   - no ACEi/ARB or aldactone with renal issues  - hydralazine recently added by EP- continued  Hold lasix drip temporarily until his potassium levels get better. lasix drip restarted   Add Aldactone 25 mg daily--> increase to 50 mg daily   Net -12L     #Hypoxia  - suspect 2/2 fluid overload  - currently on 2L - wean as able   - diurese as above, start IS, wean O2      #CKD3a  - f/b Dr. Janet Mae  - Baseline Crt 1.0  - Crt 1.4 on admit--> 1.3 --1.4-- 1.3   - nephro c/s  - monitor BMP daily   - not meeting SUDHA criteria     #Hyponatremia  - improving with diuresis      #Neurogenic bladder  - practices ISC at home, now with langley cath in place for fluid management   - UA sent on admit with WBC, urine culture e.coli   Continue oral cipro    #CAD  - with history of PCI  - he denies chest pain now   - elevated troponin noted- chronic on review of past labs  - cont ASA, statin, toprol      #History of PE  - cont Eliquis 5 mg BID      #COPD  - no AE  - cont inhalers      #DM2  - cont Lantus and use SSI  - adjust lantus     #Right BKA     #Chronic wounds  - right knee, LLE, sacrum.  F/b [facility].  Wound RN c/s     #Gitelman syndrome   - home magnesium continued      #Chronic anemia  - Hb stable   - cont PO iron       DVT Prophylaxis: Eliquis  Diet: ADULT DIET; Regular; 4 carb choices (60 gm/meal); 1500 ml  ADULT ORAL NUTRITION SUPPLEMENT; Breakfast, Dinner;  Low Calorie/High Protein Oral Supplement  Code Status: Full Code    PT/OT Eval Status: ordered    Fina Shaver MD

## 2022-02-07 NOTE — FLOWSHEET NOTE
Shift assessment complete. See flowsheet for full assessment. Pt denies any needs, call light within reach.        02/06/22 1929   Vital Signs   Temp 96.9 °F (36.1 °C)   Temp Source Oral   Pulse 83   Heart Rate Source Monitor   Resp 20   /75   BP Location Left upper arm   Patient Position Semi fowlers   Level of Consciousness Alert (0)   MEWS Score 1   Patient Currently in Pain Denies   Oxygen Therapy   SpO2 96 %

## 2022-02-07 NOTE — FLOWSHEET NOTE
02/07/22 0842   Vital Signs   Temp 96.9 °F (36.1 °C)   Temp Source Oral   Pulse 70   Heart Rate Source Monitor   Resp 20   /78   BP Location Left upper arm   Patient Position High fowlers   Level of Consciousness Alert (0)   MEWS Score 1   Patient Currently in Pain Denies   Oxygen Therapy   SpO2 94 %   Pulse Oximeter Device Mode Intermittent   Pulse Oximeter Device Location Finger   O2 Device Nasal cannula   O2 Flow Rate (L/min) 2 L/min   Patient is resting showing no s/s of distress. Patient is alert and oriented. Meds were given, see MAR. Patient is denying any needs. Bed is in lowest position and call light is within reach. Will continue to monitor. Shift assessment complete, see flowsheets.

## 2022-02-07 NOTE — PROGRESS NOTES
Dr June Paget notified of [id number] [facility] at 80. Per Dr June Paget, given total of 10U [place], recheck FSBS in 4 hours, if above 300 give another 6U.

## 2022-02-07 NOTE — PROGRESS NOTES
Hospitalist Progress Note      PCP: Shaun Ovalle MD    Date of Admission: 1/30/2022      49 y.o. male with paraplegia, coronary artery disease, ischemic cardiomyopathy with ejection fraction 25%, hypertension, COPD, history of PE, status post right BKA, chronic left lower extremity and sacral wounds followed by wound care center, CKD, Gitelman syndrome who presented to [facility] ED with complaint of fluid overload. Subjective: diuresing well with lasix gtt.    Sound asleep this am.             Medications:  Reviewed    Infusion Medications    furosemide (LASIX) infusion 10 mg/hr (02/06/22 1749)    dextrose Stopped (02/03/22 1134)    sodium chloride 100 mL/hr at 02/03/22 1244     Scheduled Medications    insulin glargine  33 Units SubCUTAneous BID    insulin lispro  7 Units SubCUTAneous TID WC    spironolactone  50 mg Oral Daily    ciprofloxacin  500 mg Oral 2 times per day    hydrALAZINE  10 mg Oral BID    menthol-zinc oxide   Topical BID    apixaban  5 mg Oral BID    aspirin  81 mg Oral Nightly    cetirizine  10 mg Oral Daily    docusate sodium  100 mg Oral BID    ferrous sulfate  325 mg Oral Daily with breakfast    budesonide-formoterol  2 puff Inhalation BID    metoprolol succinate  25 mg Oral BID    montelukast  10 mg Oral Daily    pantoprazole  40 mg Oral QAM AC    polyethylene glycol  17 g Oral Daily    senna  2 tablet Oral Nightly    traZODone  50 mg Oral Nightly    insulin lispro  0-12 Units SubCUTAneous TID WC    insulin lispro  0-6 Units SubCUTAneous Nightly    sodium chloride flush  5-40 mL IntraVENous 2 times per day    magnesium oxide  800 mg Oral BID     PRN Meds: potassium chloride, magnesium sulfate, albuterol, glucose, glucagon (rDNA), dextrose, dextrose bolus (hypoglycemia) **OR** dextrose bolus (hypoglycemia), albuterol, cyclobenzaprine, sodium chloride flush, sodium chloride, ondansetron **OR** ondansetron, acetaminophen **OR** acetaminophen      Intake/Output Summary (Last 24 hours) at 2/7/2022 1423  Last data filed at 2/7/2022 1229  Gross per 24 hour   Intake 642 ml   Output 3100 ml   Net -2458 ml       Physical Exam Performed:    /78   Pulse 82   Temp 96.6 °F (35.9 °C) (Oral)   Resp 18   Ht 6' 2\" (1.88 m)   Wt 233 lb 1.6 oz (105.7 kg)   SpO2 95%   BMI 29.93 kg/m²       Gen: No distress. Alert. Eyes: PERRL. No sclera icterus. No conjunctival injection. ENT: No discharge. Pharynx clear. Neck: Trachea midline. Normal thyroid. Resp: No accessory muscle use. No crackles. No wheezes. No rhonchi. No dullness on percussion. CV: Regular rate. Regular rhythm. No murmur or rub. 3 + edema.  Extending to the lower abdominal wall  GI: Non-tender. Non-distended. No masses. No organomegaly. Normal bowel sounds. No hernia.  Left lower quadrant ostomy bag  Skin: Warm and dry. No nodule on exposed extremities. No rash on exposed extremities. Lymph: No cervical LAD. No supraclavicular LAD. M/S: Right BKA  Neuro: Paraplegia   Psych: Oriented x 3. No anxiety or agitation. Labs:   Recent Labs     02/05/22  0500 02/06/22  0515 02/07/22  0610   WBC 7.0 6.7 6.5   HGB 10.6* 10.5* 10.4*   HCT 33.2* 32.8* 32.8*    147 196     Recent Labs     02/05/22  0500 02/06/22  0515 02/07/22  0505   * 139 132*   K 3.6 3.7 3.7   CL 93* 93* 88*   CO2 31 32 33*   BUN 88* 90* 95*   CREATININE 1.3 1.4* 1.4*   CALCIUM 9.1 9.1 9.5   PHOS 3.5 3.6 3.7     No results for input(s): AST, ALT, BILIDIR, BILITOT, ALKPHOS in the last 72 hours. No results for input(s): INR in the last 72 hours. No results for input(s): Lesly Gala in the last 72 hours.     Urinalysis:      Lab Results   Component Value Date    NITRU Negative 01/30/2022    WBCUA  01/30/2022    BACTERIA 4+ 01/30/2022    RBCUA 3-4 01/30/2022    BLOODU TRACE-INTACT 01/30/2022    SPECGRAV 1.010 01/30/2022    GLUCOSEU Negative 01/30/2022    GLUCOSEU >=1000 mg/dL 08/31/2010       Radiology:  XR CHEST PORTABLE Final Result   Increasing right basilar pleural and parenchymal disease consistent with   atelectasis or pneumonia and a small pleural effusion      Improved left basilar subsegmental atelectasis                 Assessment/Plan:    Active Hospital Problems    Diagnosis     Fluid overload [E87.70]     Acute on chronic combined systolic and diastolic CHF (congestive heart failure) ([facility]) [I50.43]     SUDHA (acute kidney injury) ([facility] [address] [N17.9]            #Acute on chronic systolic CHF  #Ischemic cardiomyopathy   #Anasarca  - EF 25-30% on most recent limited echo 1/10/22  - prior to admission he was taking Demadex 100 mg daily and metolazone 5 mg daily  - seen by nephro- now on Lasix gtt.  Monitor weights, UOP, and BMP  - cont toprol XL   - no ACEi/ARB or aldactone with renal issues  - hydralazine recently added by EP- continued  Hold lasix drip temporarily until his potassium levels get better. lasix drip restarted   Add Aldactone 25 mg daily--> increase to 50 mg daily   Net -12L     #Hypoxia  - suspect 2/2 fluid overload  - currently on 2L - wean as able   - diurese as above, start IS, wean O2      #CKD3a  - f/b Dr. Justin Hua  - Baseline Crt 1.0  - Crt 1.4 on admit--> 1.3 --1.4-- 1.3   - nephro c/s  - monitor BMP daily   - not meeting SUDHA criteria     #Hyponatremia  - improving with diuresis      #Neurogenic bladder  - practices ISC at home, now with langley cath in place for fluid management   - UA sent on admit with WBC, urine culture e.coli   Continue oral cipro    #CAD  - with history of PCI  - he denies chest pain now   - elevated troponin noted- chronic on review of past labs  - cont ASA, statin, toprol      #History of PE  - cont Eliquis 5 mg BID      #COPD  - no AE  - cont inhalers      #DM2  - cont Lantus and use SSI  - adjust lantus     #Right BKA     #Chronic wounds  - right knee, LLE, sacrum.  F/b [facility].  Wound RN c/s     #Gitelman syndrome   - home magnesium continued      #Chronic anemia  - Hb stable   - cont

## 2022-02-07 NOTE — PROGRESS NOTES
Nephrology Progress Note   [facility]. [facility]      This patient is a 49year old male whom we are following for SUDHA on CKD. Subjective: The patient was seen and examined. UO=2.7L the past 24H. BP stable. Family History: No family at bedside  ROS: No nausea or vomiting      Vitals:  /78   Pulse 70   Temp 96.9 °F (36.1 °C) (Oral)   Resp 20   Ht 6' 2\" (1.88 m)   Wt 233 lb 1.6 oz (105.7 kg)   SpO2 94%   BMI 29.93 kg/m²   I/O last 3 completed shifts: In: 222 [P.O.:222]  Out: 4000 [OXOHL:4681; Stool:250]  I/O this shift:  In: 240 [P.O.:240]  Out: 1000 [Urine:1000]    Physical Exam:  Physical Exam  Vitals reviewed. Constitutional:       General: He is not in acute distress. HENT:      Head: Normocephalic and atraumatic. Pulmonary:      Effort: Pulmonary effort is normal. No respiratory distress. Abdominal:      Comments: Abdominal wall edema   Musculoskeletal:      Right lower leg: Edema present. Left lower leg: Edema present. Neurological:      Mental Status: He is alert.            Medications:   insulin glargine  33 Units SubCUTAneous BID    insulin lispro  7 Units SubCUTAneous TID WC    spironolactone  50 mg Oral Daily    ciprofloxacin  500 mg Oral 2 times per day    hydrALAZINE  10 mg Oral BID    menthol-zinc oxide   Topical BID    apixaban  5 mg Oral BID    aspirin  81 mg Oral Nightly    cetirizine  10 mg Oral Daily    docusate sodium  100 mg Oral BID    ferrous sulfate  325 mg Oral Daily with breakfast    budesonide-formoterol  2 puff Inhalation BID    metoprolol succinate  25 mg Oral BID    montelukast  10 mg Oral Daily    pantoprazole  40 mg Oral QAM AC    polyethylene glycol  17 g Oral Daily    senna  2 tablet Oral Nightly    traZODone  50 mg Oral Nightly    insulin lispro  0-12 Units SubCUTAneous TID WC    insulin lispro  0-6 Units SubCUTAneous Nightly    sodium chloride flush  5-40 mL IntraVENous 2 times per day    magnesium oxide  800 mg Oral BID Labs:  Recent Labs     02/05/22  0500 02/06/22  0515 02/07/22  0610   WBC 7.0 6.7 6.5   HGB 10.6* 10.5* 10.4*   HCT 33.2* 32.8* 32.8*   MCV 76.4* 76.0* 76.5*    147 196     Recent Labs     02/05/22  0500 02/06/22  0515 02/07/22  0505   * 139 132*   K 3.6 3.7 3.7   CL 93* 93* 88*   CO2 31 32 33*   GLUCOSE 238* 260* 364*   PHOS 3.5 3.6 3.7   MG 1.90  --   --    BUN 88* 90* 95*   CREATININE 1.3 1.4* 1.4*   LABGLOM 57* 53* 53*   GFRAA >60 >60 >60           Assessment/      Acute Kidney Injury:    - Etiology:  Decompensated heart failure with tense abdominal wall edema and increased intra-abdominal pressure causing decreased renal blood flow and responsiveness to diuretics     Chronic Kidney Disease:  Stage 3  - Multiple episodes of SUDHA, previously needing HD as outpatient  - Last Scr was 1 but BUN 61  - Follows with Dr. Donna Escobar in office     Hyponatremia- Hypervolemic   - Responding to diuresis     -CHF with reduced EF              Decompensated but respiratory status is ok     -Chronic dependent lymphedema in the setting of paraplegia     -Neurogenic bladder, history of self-catheterization every 4 hours              ZSA with langley     -Hypokalemia - prn replacement     Plan/      - Continue lasix drip and Spironolactone. - Monitor electrolytes and renal function.   - Fluid restriction and low sodium diet. Please do not hesitate to contact me at [phone] if with questions. Thank you! Sandy Walsh MD  [facility] [place]  2/7/2022

## 2022-02-07 NOTE — CARE COORDINATION
INTERDISCIPLINARY PLAN OF CARE CONFERENCE    Date/Time: 2/7/2022 4:44 PM  Completed by: Rod Gamble RN, Case Management      Patient Name:  Jake Rehman  YOB: 1965  Admitting Diagnosis: SUDHA (acute kidney injury) ([facility] [address] [N17.9]  Fluid overload [E87.70]  Acute on chronic combined systolic and diastolic CHF (congestive heart failure) ([facility] [address] [I50.43]  Anasarca associated with disorder of kidney [N04.9]  Decreased urine output [R34]     Admit Date/Time:  1/30/2022 12:16 PM    Chart reviewed. Interdisciplinary team contacted or reviewed plan related to patient progress and discharge plans. Disciplines included Case Management, Nursing, and Dietitian. Current Status:ongoing  PT/OT recommendation for discharge plan of care: n/a    Expected D/C Disposition:  Home  Confirmed plan with patient and/or family Yes confirmed with: (name) pt  Met with:pt  Discharge Plan Comments: Reviewed chart. Role of discharge planner explained and patient verbalized understanding. Pt is from home with family and plans to return. Pt states that he wants [facility] . Pt lives at home with his mom and daughter and plans to return. Pt states that he would prefer [facility] transport to home. Pt is paraplegic. Pt is active with [facility] [address] and [facility] always transport him. Pt is active with the Waiver program, however it is on hold d/t not having any HHA at this time.        Home O2 in place on admit: No  Pt informed of need to bring portable home O2 tank on day of discharge for nursing to connect prior to leaving:  Not Indicated  Verbalized agreement/Understanding:  Not Indicated

## 2022-02-07 NOTE — FLOWSHEET NOTE
02/07/22 1408   Vital Signs   Temp 96.6 °F (35.9 °C)   Temp Source Oral   Pulse 82   Heart Rate Source Monitor   Resp 18   /78   BP Location Left upper arm   Patient Position Semi fowlers   Level of Consciousness Alert (0)   MEWS Score 1   Patient Currently in Pain Denies   Oxygen Therapy   SpO2 95 %   Pulse Oximeter Device Mode Intermittent   Pulse Oximeter Device Location Finger   O2 Device Nasal cannula   O2 Flow Rate (L/min) 2 L/min   Patient resting. Denies any needs. Wound care completed at this time.  See flow sheets/

## 2022-02-08 NOTE — FLOWSHEET NOTE
02/08/22 1813   Wound 11/05/21 # 81, mid-back, Surgical, full thickness, onset June 2015   Date First Assessed/Time First Assessed: 11/05/21 1133   Primary Wound Type: Surgical Type  Wound Description (Comments): # 81, mid-back, Surgical, full thickness, onset June 2015   Wound Etiology Surgical   Dressing Status Clean;Dry; Intact   Wound Cleansed Cleansed with saline   Dressing/Treatment Foam   Dressing Change Due 02/09/22   Wound Assessment Exposed hardware;Pink/red;Bleeding   Drainage Amount Large   Drainage Description Sanguinous   Odor Malodorous/putrid   [place]het[place]-wound Assessment Blanchable erythema   Wound 01/31/22 Coccyx Large open area present to coccyx and buttocks area. Non-blanchable redness. Date First Assessed/Time First Assessed: 01/31/22 0030   Present on Hospital Admission: Yes  Primary Wound Type: Pressure Injury  Location: Coccyx  Wound Description (Comments): Large open area present to coccyx and buttocks area. Non-blanchable redn. .. Dressing Status Clean;Dry; Intact   Wound Cleansed Cleansed with saline   Dressing/Treatment Foam   Wound Assessment Dry;Erythema   Drainage Amount Small   Odor Malodorous/putrid   [place]het[place]-wound Assessment Non-blanchable erythema     Dressing change to mid back and sacrum completed at this time. Bath also completed.  Pt tolerated well    Burl Spurling, RN

## 2022-02-08 NOTE — PROGRESS NOTES
Hospitalist Progress Note      PCP: Mary Martines MD    Date of Admission: 1/30/2022      49 y.o. male with paraplegia, coronary artery disease, ischemic cardiomyopathy with ejection fraction 25%, hypertension, COPD, history of PE, status post right BKA, chronic left lower extremity and sacral wounds followed by wound care center, CKD, Gitelman syndrome who presented to [facility] ED with complaint of fluid overload. Subjective:       Ongoing marked third spacing of fluids. Diuresis slowed and BUN up to 100 today. Nephrology opened discussion of HD/Ultrafiltration - pt in agreement. Eliquis and ASA on hold as of today for line placement.            Medications:  Reviewed    Infusion Medications    dextrose Stopped (02/03/22 1134)    sodium chloride 100 mL/hr at 02/03/22 1244     Scheduled Medications    insulin glargine  33 Units SubCUTAneous BID    insulin lispro  7 Units SubCUTAneous TID WC    ciprofloxacin  500 mg Oral 2 times per day    hydrALAZINE  10 mg Oral BID    menthol-zinc oxide   Topical BID    [Held by provider] apixaban  5 mg Oral BID    [Held by provider] aspirin  81 mg Oral Nightly    cetirizine  10 mg Oral Daily    docusate sodium  100 mg Oral BID    ferrous sulfate  325 mg Oral Daily with breakfast    budesonide-formoterol  2 puff Inhalation BID    metoprolol succinate  25 mg Oral BID    montelukast  10 mg Oral Daily    pantoprazole  40 mg Oral QAM AC    polyethylene glycol  17 g Oral Daily    senna  2 tablet Oral Nightly    traZODone  50 mg Oral Nightly    insulin lispro  0-12 Units SubCUTAneous TID WC    insulin lispro  0-6 Units SubCUTAneous Nightly    sodium chloride flush  5-40 mL IntraVENous 2 times per day    magnesium oxide  800 mg Oral BID     PRN Meds: potassium chloride, magnesium sulfate, albuterol, glucose, glucagon (rDNA), dextrose, dextrose bolus (hypoglycemia) **OR** dextrose bolus (hypoglycemia), albuterol, cyclobenzaprine, sodium chloride flush, sodium chloride, ondansetron **OR** ondansetron, acetaminophen **OR** acetaminophen      Intake/Output Summary (Last 24 hours) at 2/8/2022 1340  Last data filed at 2/8/2022 1318  Gross per 24 hour   Intake 600 ml   Output 2350 ml   Net -1750 ml       Physical Exam Performed:    /80   Pulse 88   Temp 97.5 °F (36.4 °C) (Oral)   Resp 18   Ht 6' 2\" (1.88 m)   Wt 232 lb (105.2 kg)   SpO2 94%   BMI 29.79 kg/m²       Gen: No distress. Alert. Eyes: PERRL. No sclera icterus. No conjunctival injection. ENT: No discharge. Pharynx clear. Neck: Trachea midline. Normal thyroid. Resp: No accessory muscle use. No crackles. No wheezes. No rhonchi. No dullness on percussion. CV: Regular rate. Regular rhythm. No murmur or rub. 3 + edema.  Extending to the lower abdominal wall  GI: Non-tender. Non-distended. No masses. No organomegaly. Normal bowel sounds. No hernia.  Left lower quadrant ostomy bag  Skin: Warm and dry. No nodule on exposed extremities. No rash on exposed extremities. Lymph: No cervical LAD. No supraclavicular LAD. M/S: Right BKA  Neuro: Paraplegia   Psych: Oriented x 3. No anxiety or agitation. Labs:   Recent Labs     02/06/22  0515 02/07/22  0610 02/08/22  0505   WBC 6.7 6.5 7.8   HGB 10.5* 10.4* 10.5*   HCT 32.8* 32.8* 32.6*    196 198     Recent Labs     02/06/22  0515 02/07/22  0505 02/08/22  0505    132* 130*   K 3.7 3.7 3.8   CL 93* 88* 86*   CO2 32 33* 31   BUN 90* 95* 100*   CREATININE 1.4* 1.4* 1.3   CALCIUM 9.1 9.5 8.9   PHOS 3.6 3.7 3.5     No results for input(s): AST, ALT, BILIDIR, BILITOT, ALKPHOS in the last 72 hours. No results for input(s): INR in the last 72 hours. No results for input(s): Myrla Greulich in the last 72 hours.     Urinalysis:      Lab Results   Component Value Date    NITRU Negative 01/30/2022    WBCUA  01/30/2022    BACTERIA 4+ 01/30/2022    RBCUA 3-4 01/30/2022    BLOODU TRACE-INTACT 01/30/2022    SPECGRAV 1.010 01/30/2022 GLUCOSEU Negative 01/30/2022    GLUCOSEU >=1000 mg/dL 08/31/2010       Radiology:  XR CHEST PORTABLE   Final Result   Increasing right basilar pleural and parenchymal disease consistent with   atelectasis or pneumonia and a small pleural effusion      Improved left basilar subsegmental atelectasis         IR TUNNELED CVC PLACE WO SQ PORT/PUMP > 5 YEARS    (Results Pending)           Assessment/Plan:    Active Hospital Problems    Diagnosis     Fluid overload [E87.70]     Acute on chronic combined systolic and diastolic CHF (congestive heart failure) ([facility]) [I50.43]     SUDHA (acute kidney injury) ([facility] [address] [N17.9]            #Acute on chronic systolic CHF  #Ischemic cardiomyopathy   #Anasarca  - EF 25-30% on most recent limited echo 1/10/22  - prior to admission he was taking Demadex 100 mg daily and metolazone 5 mg daily  - seen by nephro- now on Lasix gtt.  Monitor weights, UOP, and BMP  - cont toprol XL   - no ACEi/ARB or aldactone with renal issues   - lasix gtt stopped 2/8 due to BUN jumping to 100.    - nephrology discussed dialysis with pt - still grossly fluid overloaded and third spacing   - eliquis and asa held starting 2/8 for line placement.        #Hypoxia  - suspect 2/2 fluid overload  - currently on 2L - wean as able   - diurese as above, start IS, wean O2        #CKD3a  - f/b Dr. Gayla Medrano  - Baseline Crt 1.0  - Crt 1.4 on admit--> 1.3 --1.4-- 1.3   - nephro c/s  - monitor BMP daily   - not meeting SUDHA criteria       #Neurogenic bladder  - practices ISC at home, now with langley cath in place for fluid management   - UA sent on admit with WBC, urine culture e.coli   Continue oral cipro      #CAD  - with history of PCI  - he denies chest pain now   - elevated troponin noted- chronic on review of past labs  - cont ASA, statin, toprol        #History of PE  - cont Eliquis 5 mg BID - see above       #COPD  - no AE  - cont inhalers        #DM2  - cont Lantus and use SSI  - adjust lantus       #Right BKA       #Chronic wounds  - right knee, LLE, sacrum.  F/b [facility].  Wound RN c/s       #Gitelman syndrome   - home magnesium continued        #Chronic anemia  - Hgb stable   - cont PO iron         DVT Prophylaxis: Eliquis  Diet: ADULT DIET; Regular; 4 carb choices (60 gm/meal); 1500 ml  ADULT ORAL NUTRITION SUPPLEMENT; Breakfast, Dinner;  Low Calorie/High Protein Oral Supplement  Diet NPO  Code Status: Full Code      PT/OT Eval Status: ordered      Noelle Petrironal care      Cyrus Ruiz MD

## 2022-02-08 NOTE — PROGRESS NOTES
Nephrology Progress Note   [email or web address]      This patient is a 49year old male whom we are following for SUDHA on CKD. Subjective: The patient was seen and examined. Feels tired and sleepy     UO=2 L the past 24H. BP stable. -14l net but still overloaded; ; lasix gtt stopped this am by Dr Leida Hua    Family History: No family at bedside  ROS: No nausea or vomiting      Vitals:  /64   Pulse 85   Temp 97.7 °F (36.5 °C) (Oral)   Resp 19   Ht 6' 2\" (1.88 m)   Wt 232 lb (105.2 kg)   SpO2 95%   BMI 29.79 kg/m²   I/O last 3 completed shifts:   In: 56 [P.O.:882]  Out: 3150 [Urine:2900; Stool:250]  I/O this shift:  In: 360 [P.O.:360]  Out: 1025 [Urine:1025]    Physical Exam:  Gen: alert, awake  Cardiovascular:  S1, S2 without m/r/g 3+ lower extremity edema; abd wall edema and in meeta   Respiratory: decreased   Abdomen:  soft, nt, nd,   Neuro/Psy: AAoriented times 3 ; moves all 4 ext    Medications:   furosemide  80 mg Oral Once    insulin glargine  33 Units SubCUTAneous BID    insulin lispro  7 Units SubCUTAneous TID WC    ciprofloxacin  500 mg Oral 2 times per day    hydrALAZINE  10 mg Oral BID    menthol-zinc oxide   Topical BID    [Held by provider] apixaban  5 mg Oral BID    [Held by provider] aspirin  81 mg Oral Nightly    cetirizine  10 mg Oral Daily    docusate sodium  100 mg Oral BID    ferrous sulfate  325 mg Oral Daily with breakfast    budesonide-formoterol  2 puff Inhalation BID    metoprolol succinate  25 mg Oral BID    montelukast  10 mg Oral Daily    pantoprazole  40 mg Oral QAM AC    polyethylene glycol  17 g Oral Daily    senna  2 tablet Oral Nightly    traZODone  50 mg Oral Nightly    insulin lispro  0-12 Units SubCUTAneous TID WC    insulin lispro  0-6 Units SubCUTAneous Nightly    sodium chloride flush  5-40 mL IntraVENous 2 times per day    magnesium oxide  800 mg Oral BID         Labs:  Recent Labs     02/06/22  0515 02/07/22  0610 02/08/22  0505   WBC 6.7 6.5 7.8   HGB 10.5* 10.4* 10.5*   HCT 32.8* 32.8* 32.6*   MCV 76.0* 76.5* 75.9*    196 198     Recent Labs     02/06/22  0515 02/07/22  0505 02/08/22  0505    132* 130*   K 3.7 3.7 3.8   CL 93* 88* 86*   CO2 32 33* 31   GLUCOSE 260* 364* 235*   PHOS 3.6 3.7 3.5   BUN 90* 95* 100*   CREATININE 1.4* 1.4* 1.3   LABGLOM 53* 53* 57*   GFRAA >60 >60 >60           Assessment/      Acute Kidney Injury:    - Etiology:  Decompensated heart failure with tense abdominal wall edema and increased intra-abdominal pressure causing decreased renal blood flow and responsiveness to diuretics     Chronic Kidney Disease:  Stage 3  - Multiple episodes of SUDHA, previously needing HD as outpatient  - Last Scr was 1 but BUN 61  - Follows with Dr. Sharon Szymanski in office     Hyponatremia- Hypervolemic   - Responding to diuresis     -CHF with reduced EF              Decompensated but respiratory status is ok     -Chronic dependent lymphedema in the setting of paraplegia     -Neurogenic bladder, history of self-catheterization every 4 hours              TIN with langley     -Hypokalemia - prn replacement     Plan/   - will plan to start HD from 2/9 after HD catheter, IR consulted for [facility]  -lasix 80 mg iv times one  -OP HDU arrangement, d/w Barbara from   -lasix drip and Spironolactone was stopped on 2/8 by IM  - Monitor electrolytes and renal function.   - Fluid restriction and low sodium diet. Please do not hesitate to contact me at [phone] if with questions. Thank you! Swapna Mckenna MD  [facility]  2/8/2022

## 2022-02-08 NOTE — FLOWSHEET NOTE
02/08/22 1315   Vital Signs   Temp 97.5 °F (36.4 °C)   Temp Source Oral   Pulse 88   Heart Rate Source Monitor   Resp 18   /80   BP Location Left upper arm   Patient Position Semi fowlers   Level of Consciousness Alert (0)   MEWS Score 1   Patient Currently in Pain Denies   Pain Assessment   Pain Assessment 0-10   Pain Level 0   Patient's Stated Pain Goal No pain   Oxygen Therapy   SpO2 94 %   O2 Device Nasal cannula   O2 Flow Rate (L/min) 2 L/min     Afternoon vitals completed. Meds given per MAR. Pt stable at this time. Family at bedside.     Dolly Early RN

## 2022-02-08 NOTE — CARE COORDINATION
INTERDISCIPLINARY PLAN OF CARE CONFERENCE    Date/Time: 2/8/2022 10:32 AM  Completed by: Gely Gusman RN, Case Management      Patient Name:  Luis Daniel Damon  YOB: 1965  Admitting Diagnosis: SUDHA (acute kidney injury) ([facility] [address] [N17.9]  Fluid overload [E87.70]  Acute on chronic combined systolic and diastolic CHF (congestive heart failure) ([facility] [address] [I50.43]  Anasarca associated with disorder of kidney [N04.9]  Decreased urine output [R34]     Admit Date/Time:  1/30/2022 12:16 PM    Chart reviewed. Interdisciplinary team contacted or reviewed plan related to patient progress and discharge plans. Disciplines included Case Management, Nursing, and Dietitian. Current Status:ongoing  PT/OT recommendation for discharge plan of care: n/a    Expected D/C Disposition:  Home  Confirmed plan with patient and/or family Yes confirmed with: (name) pt  Met with:pt  Discharge Plan Comments: Reviewed chart. Role of discharge planner explained and patient verbalized understanding. Pt is from home ([place]) with mother and daughter and plans to return. Pt is a paraplegic. Family provides care. Pt sees [facility] and uses [facility] to [facility] from. Pt would like [facility] to transport him home. Pt is active with [facility] for HC. Pt is active with [employer], but there are no aides available, therefore [employer] is on hold. Cruzito spoke with Dr. Jaylin Bejarano and he would like pt to have a permanent outpt HD slot and to have CM start looking for a HD slot. Pt lives in [place]. CM called and left a  for Kenroy Mckeon with [facility] to look into a HD slot for pt. Pt will need a bed d/t being paraplegic. Pt needs a vascath placed and it will be tomorrow, as pt had Eliquis this AM.     Pt states that he has been to the [facility] in the past and has [facility] [address] [facility]. Addendum 2/8/2022 1045: CRUZITO spoke with Kenroy  with [facility] and she will let CM know.     Addendum 2/8/2022 1510: CM spoke with Charu Camejo with [facility] and she will call [facility] location back. The [facility] asked Charu Camejo if she \"was sure that the MD wanted pt to have HD\". CM explained that the Dr. Dash Han asked this CM to set up HD spot for pt outpt for him to have upon d/c. Charu Camejo states she will call [facility] HD back. Charu Camejo with [facility] will let CM know if [facility] accept and if not, where a time slot is available.            Home O2 in place on admit: No  Pt informed of need to bring portable home O2 tank on day of discharge for nursing to connect prior to leaving:  Not Indicated  Verbalized agreement/Understanding:  Not Indicated

## 2022-02-08 NOTE — FLOWSHEET NOTE
02/08/22 0815   Vital Signs   Temp 97.7 °F (36.5 °C)   Temp Source Oral   Pulse 85   Heart Rate Source Monitor   Resp 19   /64   BP Location Left upper arm   Patient Position Semi fowlers   Level of Consciousness Alert (0)   MEWS Score 1   Patient Currently in Pain Denies   BP Upper/Lower Upper   Pain Assessment   Pain Assessment 0-10   Pain Level 0   Patient's Stated Pain Goal No pain   Oxygen Therapy   SpO2 95 %   O2 Device Nasal cannula   O2 Flow Rate (L/min) 2 L/min       Pt assessment complete; see flow sheets. Vitals completed. Meds given per MAR. Pt laying in bed; eating breakfast. Pt denies any other care needs at this time. Pt stable at this time.     Flex Strong RN

## 2022-02-08 NOTE — FLOWSHEET NOTE
Pt appears to be resting comfortably. VSS. Per pt, no new needs at this time.           02/08/22 0135   Vital Signs   Temp 98.9 °F (37.2 °C)   Temp Source Oral   Pulse 79   Heart Rate Source Monitor   Resp 18   /69   BP Location Left upper arm   Patient Position High fowlers   BP Upper/Lower Upper   Oxygen Therapy   SpO2 95 %   O2 Device Nasal cannula   O2 Flow Rate (L/min) 2 L/min

## 2022-02-08 NOTE — FLOWSHEET NOTE
Shift assessment complete. See flowsheet for full assessment. Pt denies any needs, call light within reach.      02/07/22 1937   Vital Signs   Temp 97.2 °F (36.2 °C)   Temp Source Oral   Pulse 95   Heart Rate Source Monitor   Resp 18   /75   BP Location Left upper arm   Patient Position Semi fowlers   Level of Consciousness Alert (0)   MEWS Score 1   Patient Currently in Pain Denies   Oxygen Therapy   SpO2 95 %   O2 Device Nasal cannula   O2 Flow Rate (L/min) 2 L/min

## 2022-02-09 NOTE — PROGRESS NOTES
Image guided temporary dialysis catheter completed. Dr. Gilles Jones placed a 12.5 Iranian 20 cm Sel[employer]or[employer] Elite acute triple lumen dialysis catheter LOT [id number] EXP 09/01/2022 in the right IJ. Line aspirates and flushes with ease. Dialysis catheter secured with sutures. Surgical site dressing clean, dry, and intact. Each dialysis access lumen heparin locked with 1.4 ml of IV heparin each. IV access pigtail NS locked. Pt tolerated procedure without any signs or symptoms of distress. Vital signs stable. Report called to [facility] RN on PCU. Pt transported back to PCU in stable condition via bed by transport. Line ok to use per Gilles Jones.     Vital Signs  Vitals:    02/09/22 1355   BP: 127/73   Pulse: 90   Resp: 22   Temp: 98 °F (36.7 °C)   SpO2: 95%

## 2022-02-09 NOTE — FLOWSHEET NOTE
02/09/22 1623   Wound 11/05/21 # 81, mid-back, Surgical, full thickness, onset June 2015   Date First Assessed/Time First Assessed: 11/05/21 1133   Primary Wound Type: Surgical Type  Wound Description (Comments): # 81, mid-back, Surgical, full thickness, onset June 2015   Dressing Status Clean;Dry; Intact   Wound Cleansed Cleansed with saline   Dressing/Treatment Foam   Wound Assessment Exposed hardware;Pink/red;Bleeding   Drainage Amount Large   Drainage Description Sanguinous   Odor Malodorous/putrid   Chetna-wound Assessment Blanchable erythema   Wound 01/31/22 Coccyx Large open area present to coccyx and buttocks area. Non-blanchable redness. Date First Assessed/Time First Assessed: 01/31/22 0030   Present on Hospital Admission: Yes  Primary Wound Type: Pressure Injury  Location: Coccyx  Wound Description (Comments): Large open area present to coccyx and buttocks area. Non-blanchable redn. .. Dressing Status New dressing applied   Wound Cleansed Cleansed with saline   Dressing/Treatment Foam   Dressing Change Due 02/09/22   Wound Assessment Dry;Erythema   Drainage Amount Small   Drainage Description Thin   Odor Malodorous/putrid   Wound care completed at this time.

## 2022-02-09 NOTE — PROGRESS NOTES
Bedside report and transfer of care given to [facility] [place], RN. Pt currently resting in bed with the call light within reach. Pt denies any other care needs at this time. Pt stable at this time.   Pacheco Martel RN

## 2022-02-09 NOTE — PROGRESS NOTES
ondansetron, acetaminophen **OR** acetaminophen      Intake/Output Summary (Last 24 hours) at 2/9/2022 1241  Last data filed at 2/9/2022 7372  Gross per 24 hour   Intake 480 ml   Output 2475 ml   Net -1995 ml       Physical Exam Performed:    /67   Pulse 86   Temp 97.8 °F (36.6 °C) (Oral)   Resp 20   Ht 6' 2\" (1.88 m)   Wt 234 lb 8 oz (106.4 kg)   SpO2 96%   BMI 30.11 kg/m²       Gen: No distress. Alert. Eyes: PERRL. No sclera icterus. No conjunctival injection. ENT: No discharge. Pharynx clear. Neck: Trachea midline. Normal thyroid. Resp: No accessory muscle use. No crackles. No wheezes. No rhonchi. No dullness on percussion. CV: Regular rate. Regular rhythm. No murmur or rub. 3 + edema.  Extending to the lower abdominal wall  GI: Non-tender. Non-distended. No masses. No organomegaly. Normal bowel sounds. No hernia.  Left lower quadrant ostomy bag  Skin: Warm and dry. No nodule on exposed extremities. No rash on exposed extremities. Lymph: No cervical LAD. No supraclavicular LAD. M/S: Right BKA  Neuro: Paraplegia   Psych: Oriented x 3. No anxiety or agitation. Labs:   Recent Labs     02/07/22  0610 02/08/22  0505 02/09/22  0728   WBC 6.5 7.8 7.1   HGB 10.4* 10.5* 10.8*   HCT 32.8* 32.6* 33.8*    198 197     Recent Labs     02/07/22  0505 02/08/22  0505 02/09/22  0728   * 130* 134*   K 3.7 3.8 3.1*   CL 88* 86* 89*   CO2 33* 31 34*   BUN 95* 100* 101*   CREATININE 1.4* 1.3 1.2   CALCIUM 9.5 8.9 9.3   PHOS 3.7 3.5 4.0     No results for input(s): AST, ALT, BILIDIR, BILITOT, ALKPHOS in the last 72 hours. Recent Labs     02/09/22  0728   INR 1.93*     No results for input(s): Juanito Sinclair in the last 72 hours.     Urinalysis:      Lab Results   Component Value Date    NITRU Negative 01/30/2022    WBCUA  01/30/2022    BACTERIA 4+ 01/30/2022    RBCUA 3-4 01/30/2022    BLOODU TRACE-INTACT 01/30/2022    SPECGRAV 1.010 01/30/2022    GLUCOSEU Negative 01/30/2022 GLUCOSEU >=1000 mg/dL 08/31/2010       Radiology:  XR CHEST PORTABLE   Final Result   Increasing right basilar pleural and parenchymal disease consistent with   atelectasis or pneumonia and a small pleural effusion      Improved left basilar subsegmental atelectasis         IR TUNNELED CVC PLACE WO SQ PORT/PUMP > 5 YEARS    (Results Pending)           Assessment/Plan:    Active Hospital Problems    Diagnosis     Fluid overload [E87.70]     Acute on chronic combined systolic and diastolic CHF (congestive heart failure) ([facility]) [I50.43]     SUDHA (acute kidney injury) ([facility] [address] [N17.9]            #Acute on chronic systolic CHF  #Ischemic cardiomyopathy   #Anasarca  - EF 25-30% on most recent limited echo 1/10/22  - prior to admission he was taking Demadex 100 mg daily and metolazone 5 mg daily  - seen by nephro- now on Lasix gtt.  Monitor weights, UOP, and BMP  - cont toprol XL   - no ACEi/ARB or aldactone with renal issues   - lasix gtt stopped 2/8 due to BUN jumping to 100.    - nephrology discussed dialysis with pt - still grossly fluid overloaded and third spacing   - eliquis and asa held starting 2/8 for line placement.        #Hypoxia  - suspect 2/2 fluid overload  - currently on 2L - wean as able   - diurese as above, start IS, wean O2        #CKD3a  - f/b Dr. Janet Mae  - Baseline Crt 1.0  - Crt 1.4 on admit--> 1.3 --1.4-- 1.3   - nephro c/s  - monitor BMP daily   - not meeting SUDHA criteria       #Neurogenic bladder  - practices ISC at home, now with langley cath in place for fluid management   - UA sent on admit with WBC, urine culture [email or web address].coli   Continue oral cipro      #CAD  - with history of PCI  - he denies chest pain now   - elevated troponin noted- chronic on review of past labs  - cont ASA, statin, toprol        #History of PE  - cont Eliquis 5 mg BID - see above       #COPD  - no AE  - cont inhalers        #DM2  - cont Lantus and use SSI  - adjust lantus       #Right BKA       #Chronic wounds  - right knee, LLE, sacrum.  F/b [facility].  Wound RN c/s       #Gitelman syndrome   - home magnesium continued        #Chronic anemia  - Hgb stable   - cont PO iron         DVT Prophylaxis: Eliquis  Diet: Diet NPO  Code Status: Full Code      PT/OT Eval Status: ordered      Dispo - cont care. May have OP dialysis spot by Tuesday Feb 15th. Will need tunneled line before discharge.        Barney Madrigal MD

## 2022-02-09 NOTE — CARE COORDINATION
INTERDISCIPLINARY PLAN OF CARE CONFERENCE    Date/Time: 2/9/2022 10:24 AM  Completed by: Chiqui Storey RN, Case Management      Patient Name:  Franchesca James  YOB: 1965  Admitting Diagnosis: SUDHA (acute kidney injury) ([facility] [address] [N17.9]  Fluid overload [E87.70]  Acute on chronic combined systolic and diastolic CHF (congestive heart failure) ([facility] [address] [I50.43]  Anasarca associated with disorder of kidney [N04.9]  Decreased urine output [R34]     Admit Date/Time:  1/30/2022 12:16 PM    Chart reviewed. Interdisciplinary team contacted or reviewed plan related to patient progress and discharge plans. Disciplines included Case Management, Nursing, and Dietitian. Current Status:ongoing  PT/OT recommendation for discharge plan of care: n/a    Expected D/C Disposition:  Home  Confirmed plan with patient and/or family Yes confirmed with: (name) pt  Met with:pt  Discharge Plan Comments: Reviewed chart. Role of discharge planner explained and patient verbalized understanding. Pt is from [facility] home with mother and daughter and plans to return. Pt will need ambulance transport and prefers [facility]. Pt's family is involved in his care. [facility] is active with pt. Pt has Passport, but it is on hold, as they are not able to find A for pt. Pt is a paraplegic. Pt goes to [place]. Pt is to have vas cath placed today. Pt is needing HD placement outpt. This was started for looking yesterday,a s Dr. Geovanna Fletcher informed CM yesterday. CM left a VM for Darius Sexton this AM to inquire if [facility] able to [facility] pt,a s he has been there before and pt states that he can sit in a chair, as he did before when he needed HD. Awaiting to hear back from Darius Sexton with [facility]. Addendum 1130: Darius Sexton with [facility] called and stated that Kaela with [facility] states she can accept pt for HD, but cannot accept him until 2/15/2022, Tuesday.   Per [place] stated that she cannot give us a time slot or the days until Dr. Yamilex Newton gives Kal Arteaga the orders and the hep B panel (that is pending ) is complete. CM spoke with Dr. Yamilex Newton and informed him of this with verbalized understanding and he stated that he could not give orders until pt has HD first. Dr. Yamilex Newton was given Kal Arteaga number for [facility] ([phone]) to call her with orders. Pt is aware of having a spot at [facility] with verbalized understanding, but that we do not know that days or time.      Home O2 in place on admit: No  Pt informed of need to bring portable home O2 tank on day of discharge for nursing to connect prior to leaving:  Not Indicated  Verbalized agreement/Understanding:  Not Indicated

## 2022-02-09 NOTE — FLOWSHEET NOTE
02/09/22 1355   Vital Signs   Temp 98 °F (36.7 °C)   Temp Source Oral   Pulse 90   Heart Rate Source Monitor   Resp 22   /73   BP Location Left upper arm   Patient Position Semi fowlers   Level of Consciousness Alert (0)   MEWS Score 2   Patient Currently in Pain Denies   Oxygen Therapy   SpO2 95 %   Pulse Oximeter Device Mode Intermittent   Pulse Oximeter Device Location Finger   O2 Device Nasal cannula   O2 Flow Rate (L/min) 2 L/min   Patient resting. Denies any needs. Will continue to monitor.

## 2022-02-09 NOTE — PROGRESS NOTES
Shift assessment complete, see flowsheets. Medications given, see MAR. Pt A+Ox4. Aware of HD access being placed tomorrow. Denies questions. Pt very educated on plan of care. Emptied ostomy by self. Requested PRN tylenol. Denies needs or discomforts at this time. Call light in easy reach, bedside table in easy reach, and bed in lowest position.   Chong Donald RN

## 2022-02-09 NOTE — PROGRESS NOTES
Nephrology Progress Note   [email or web address]      This patient is a 49year old male whom we are following for SUDHA on CKD. Subjective:  HD on 2/9 after vasc cath      UO=2.1 L the past 24H. BP stable.  -17 l net but still overloaded; BUN > 100; lasix gtt stopped this am by Dr Goins January on 2/8    Family History: No family at bedside  ROS: No nausea or vomiting      Vitals:  /73   Pulse 90   Temp 98 °F (36.7 °C) (Oral)   Resp 22   Ht 6' 2\" (1.88 m)   Wt 234 lb 8 oz (106.4 kg)   SpO2 95%   BMI 30.11 kg/m²   I/O last 3 completed shifts: In: 840 [P.O.:840]  Out: 3200 [Urine:3200]  I/O this shift:   In: 0   Out: 1350 [Urine:1350]    Physical Exam:  Gen: alert, awake  Cardiovascular:  S1, S2 without m/r/g 3+ lower extremity edema; abd wall edema and in meeta   Respiratory: decreased   Abdomen:  soft, nt, nd,   Neuro/Psy: AAoriented times 3 ; moves all 4 ext    Medications:   magnesium oxide  800 mg Oral BID    insulin glargine  33 Units SubCUTAneous BID    insulin lispro  7 Units SubCUTAneous TID WC    ciprofloxacin  500 mg Oral 2 times per day    hydrALAZINE  10 mg Oral BID    menthol-zinc oxide   Topical BID    [Held by provider] apixaban  5 mg Oral BID    [Held by provider] aspirin  81 mg Oral Nightly    cetirizine  10 mg Oral Daily    docusate sodium  100 mg Oral BID    ferrous sulfate  325 mg Oral Daily with breakfast    budesonide-formoterol  2 puff Inhalation BID    metoprolol succinate  25 mg Oral BID    montelukast  10 mg Oral Daily    pantoprazole  40 mg Oral QAM AC    polyethylene glycol  17 g Oral Daily    senna  2 tablet Oral Nightly    traZODone  50 mg Oral Nightly    insulin lispro  0-12 Units SubCUTAneous TID WC    insulin lispro  0-6 Units SubCUTAneous Nightly    sodium chloride flush  5-40 mL IntraVENous 2 times per day         Labs:  Recent Labs     02/07/22  0610 02/08/22  0505 02/09/22  0728   WBC 6.5 7.8 7.1   HGB 10.4* 10.5* 10.8*   HCT 32.8* 32.6* 33.8*   MCV 76.5* 75.9* 76.3*    198 197     Recent Labs     02/07/22  0505 02/08/22  0505 02/09/22  0728   * 130* 134*   K 3.7 3.8 3.1*   CL 88* 86* 89*   CO2 33* 31 34*   GLUCOSE 364* 235* 163*   PHOS 3.7 3.5 4.0   BUN 95* 100* 101*   CREATININE 1.4* 1.3 1.2   LABGLOM 53* 57* >60   GFRAA >60 >60 >60           Assessment/      Acute Kidney Injury:    - Etiology:  Decompensated heart failure with tense abdominal wall edema and increased intra-abdominal pressure causing decreased renal blood flow and responsiveness to diuretics     Chronic Kidney Disease:  Stage 3  - Multiple episodes of SUDHA, previously needing HD as outpatient  - Last Scr was 1 but BUN 61  - Follows with Dr. Brian Centeno in office     Hyponatremia- Hypervolemic   - Responding to diuresis     -CHF with reduced EF              Decompensated but respiratory status is ok     -Chronic dependent lymphedema in the setting of paraplegia     -Neurogenic bladder, history of self-catheterization every 4 hours              NHO with langley     -Hypokalemia - prn replacement     Plan/   -HD from 2/9 after HD catheter, IR consulted for [facility] vas cath placed  -lasix 80 mg iv times one  -OP HDU arrangement, d/w Barbara from -has been accepted at [facility]  -lasix drip and Spironolactone was stopped on 2/8 by IM  - Monitor electrolytes and renal function.   - Fluid restriction and low sodium diet. Please do not hesitate to contact me at [phone] if with questions. Thank you! Lisa Templeton MD  [facility] [employer]  2/9/2022

## 2022-02-10 NOTE — PROGRESS NOTES
RT Inhaler-Nebulizer Bronchodilator Protocol Note    There is a bronchodilator order in the chart from a provider indicating to follow the RT Bronchodilator Protocol and there is an Initiate RT Inhaler-Nebulizer Bronchodilator Protocol order as well (see protocol at bottom of note). CXR Findings:  No results found. The findings from the last RT Protocol Assessment were as follows:   History Pulmonary Disease: Chronic pulmonary disease  Respiratory Pattern: Regular pattern and RR 12-20 bpm  Breath Sounds: Clear breath sounds  Cough: Strong, spontaneous, non-productive  Indication for Bronchodilator Therapy: None  Bronchodilator Assessment Score: 2    Aerosolized bronchodilator medication orders have been revised according to the RT Inhaler-Nebulizer Bronchodilator Protocol below. Respiratory Therapist to perform RT Therapy Protocol Assessment initially then follow the protocol. Repeat RT Therapy Protocol Assessment PRN for score 0-3 or on second treatment, BID, and PRN for scores above 3. No Indications - adjust the frequency to every 6 hours PRN wheezing or bronchospasm, if no treatments needed after 48 hours then discontinue using Per Protocol order mode. If indication present, adjust the RT bronchodilator orders based on the Bronchodilator Assessment Score as indicated below. Use Inhaler orders unless patient has one or more of the following: on home nebulizer, not able to hold breath for 10 seconds, is not alert and oriented, cannot activate and use MDI correctly, or respiratory rate 25 breaths per minute or more, then use the equivalent nebulizer order(s) with same Frequency and PRN reasons based on the score. If a patient is on this medication at home then do not decrease Frequency below that used at home.     0-3 - enter or revise RT bronchodilator order(s) to equivalent RT Bronchodilator order with Frequency of every 4 hours PRN for wheezing or increased work of breathing using Per Protocol order mode. 4-6 - enter or revise RT Bronchodilator order(s) to two equivalent RT bronchodilator orders with one order with BID Frequency and one order with Frequency of every 4 hours PRN wheezing or increased work of breathing using Per Protocol order mode. 7-10 - enter or revise RT Bronchodilator order(s) to two equivalent RT bronchodilator orders with one order with TID Frequency and one order with Frequency of every 4 hours PRN wheezing or increased work of breathing using Per Protocol order mode. 11-13 - enter or revise RT Bronchodilator order(s) to one equivalent RT bronchodilator order with QID Frequency and an Albuterol order with Frequency of every 4 hours PRN wheezing or increased work of breathing using Per Protocol order mode. Greater than 13 - enter or revise RT Bronchodilator order(s) to one equivalent RT bronchodilator order with every 4 hours Frequency and an Albuterol order with Frequency of every 2 hours PRN wheezing or increased work of breathing using Per Protocol order mode.      Electronically signed by Josiah James RCP on 2/9/2022 at 8:51 PM

## 2022-02-10 NOTE — PROGRESS NOTES
Hospitalist Progress Note      PCP: Edward Marquez MD    Date of Admission: 1/30/2022      49 y.o. male with paraplegia, coronary artery disease, ischemic cardiomyopathy with ejection fraction 25%, hypertension, COPD, history of PE, status post right BKA, chronic left lower extremity and sacral wounds followed by wound care center, CKD, Gitelman syndrome who presented to [facility] ED with complaint of fluid overload. Subjective:       Ongoing marked third spacing of fluids - improving slowly. Tep vas cath placed 2/9 and UF started. Eliquis and ASA on hold as of 2/8 for line placement.            Medications:  Reviewed    Infusion Medications    dextrose Stopped (02/03/22 1134)    sodium chloride 25 mL (02/09/22 0838)     Scheduled Medications    magnesium oxide  800 mg Oral BID    insulin glargine  33 Units SubCUTAneous BID    insulin lispro  7 Units SubCUTAneous TID WC    ciprofloxacin  500 mg Oral 2 times per day    hydrALAZINE  10 mg Oral BID    menthol-zinc oxide   Topical BID    [Held by provider] apixaban  5 mg Oral BID    [Held by provider] aspirin  81 mg Oral Nightly    cetirizine  10 mg Oral Daily    docusate sodium  100 mg Oral BID    ferrous sulfate  325 mg Oral Daily with breakfast    budesonide-formoterol  2 puff Inhalation BID    metoprolol succinate  25 mg Oral BID    montelukast  10 mg Oral Daily    pantoprazole  40 mg Oral QAM AC    polyethylene glycol  17 g Oral Daily    senna  2 tablet Oral Nightly    traZODone  50 mg Oral Nightly    insulin lispro  0-12 Units SubCUTAneous TID WC    insulin lispro  0-6 Units SubCUTAneous Nightly    sodium chloride flush  5-40 mL IntraVENous 2 times per day     PRN Meds: potassium chloride, magnesium sulfate, albuterol, glucose, glucagon (rDNA), dextrose, dextrose bolus (hypoglycemia) **OR** dextrose bolus (hypoglycemia), albuterol, cyclobenzaprine, sodium chloride flush, sodium chloride, ondansetron **OR** ondansetron, acetaminophen **OR** acetaminophen      Intake/Output Summary (Last 24 hours) at 2/10/2022 1221  Last data filed at 2/10/2022 1126  Gross per 24 hour   Intake 1440 ml   Output 8000 ml   Net -6560 ml       Physical Exam Performed:    /71   Pulse 93   Temp 97.7 °F (36.5 °C) (Oral)   Resp 16   Ht 6' 2\" (1.88 m)   Wt 216 lb 14.9 oz (98.4 kg)   SpO2 99%   BMI 27.85 kg/m²       Gen: No distress. Alert. Eyes: PERRL. No sclera icterus. No conjunctival injection. ENT: No discharge. Pharynx clear. Neck: Trachea midline. Normal thyroid. Resp: No accessory muscle use. No crackles. No wheezes. No rhonchi. No dullness on percussion. CV: Regular rate. Regular rhythm. No murmur or rub. 3 + edema.  Extending to the lower abdominal wall  GI: Non-tender. Non-distended. No masses. No organomegaly. Normal bowel sounds. No hernia.  Left lower quadrant ostomy bag  Skin: Warm and dry. No nodule on exposed extremities. No rash on exposed extremities. Lymph: No cervical LAD. No supraclavicular LAD. M/S: Right BKA  Neuro: Paraplegia   Psych: Oriented x 3. No anxiety or agitation. Labs:   Recent Labs     02/08/22  0505 02/09/22  0728 02/10/22  0549   WBC 7.8 7.1 7.5   HGB 10.5* 10.8* 10.6*   HCT 32.6* 33.8* 33.5*    197 155     Recent Labs     02/08/22  0505 02/09/22  0728 02/10/22  0549   * 134* 133*   K 3.8 3.1* 4.2   CL 86* 89* 91*   CO2 31 34* 29   * 101* 87*   CREATININE 1.3 1.2 1.3   CALCIUM 8.9 9.3 9.3   PHOS 3.5 4.0 4.5     No results for input(s): AST, ALT, BILIDIR, BILITOT, ALKPHOS in the last 72 hours. Recent Labs     02/09/22  0728   INR 1.93*     No results for input(s): Julius Tobi in the last 72 hours.     Urinalysis:      Lab Results   Component Value Date    NITRU Negative 01/30/2022    WBCUA  01/30/2022    BACTERIA 4+ 01/30/2022    RBCUA 3-4 01/30/2022    BLOODU TRACE-INTACT 01/30/2022    SPECGRAV 1.010 01/30/2022    GLUCOSEU Negative 01/30/2022    GLUCOSEU >=1000 mg/dL 08/31/2010       Radiology:  IR NONTUNNELED VASCULAR CATHETER > 5 YEARS   Final Result   Successful ultrasound guided non-tunneled HD catheter placement. Catheter is   okay for use. XR CHEST PORTABLE   Final Result   Increasing right basilar pleural and parenchymal disease consistent with   atelectasis or pneumonia and a small pleural effusion      Improved left basilar subsegmental atelectasis                 Assessment/Plan:    Active Hospital Problems    Diagnosis     Fluid overload [E87.70]     Acute on chronic combined systolic and diastolic CHF (congestive heart failure) ([facility]) [I50.43]     SUDHA (acute kidney injury) ([facility] [address] [N17.9]            #Acute on chronic systolic CHF  #Ischemic cardiomyopathy   #Anasarca  - EF 25-30% on most recent limited echo 1/10/22  - prior to admission he was taking Demadex 100 mg daily and metolazone 5 mg daily  - seen by nephro- now on Lasix gtt.  Monitor weights, UOP, and BMP  - cont toprol XL   - no ACEi/ARB or aldactone with renal issues   - lasix gtt stopped 2/8 due to BUN jumping to >100.    - nephrology discussed dialysis with pt - still grossly fluid overloaded and third spacing   - eliquis and asa held starting 2/8 for line placement.     - temp vas cath placed 2/9 and UF started.        #Hypoxia  - suspect 2/2 fluid overload  - currently on 2L - wean as able   - diurese as above, start IS, wean O2        #CKD3a  - f/b Dr. [facility]  - Baseline Crt 1.0  - Crt 1.4 on admit--> 1.3 --1.4-- 1.3   - nephro c/s  - monitor BMP daily   - not meeting SUDHA criteria       #Neurogenic bladder  - practices ISC at home, now with langley cath in place for fluid management   - UA sent on admit with WBC, urine culture e.coli   Continue oral cipro      #CAD  - with history of PCI  - he denies chest pain now   - elevated troponin noted- chronic on review of past labs  - cont ASA, statin, toprol        #History of PE  - cont Eliquis 5 mg BID - see above       #COPD  - no AE  - cont inhalers        #DM2  - cont Lantus and use SSI  - adjust lantus       #Right BKA       #Chronic wounds  - right knee, LLE, sacrum.  F/b [facility].  Wound RN c/s       #Gitelman syndrome   - home magnesium continued        #Chronic anemia  - Hgb stable   - cont PO iron         DVT Prophylaxis: Eliquis  Diet: ADULT DIET; Regular; 4 carb choices (60 gm/meal); 1500 ml  ADULT ORAL NUTRITION SUPPLEMENT; Breakfast, Lunch, Dinner; Low Calorie/High Protein Oral Supplement  Code Status: Full Code      PT/OT Eval Status: ordered      Dispo - cont care. May have OP dialysis spot by Tuesday Feb 15th. Will need tunneled line before discharge. AC in hold as of 2/8.          Jones Mcardle, MD

## 2022-02-10 NOTE — PROGRESS NOTES
Pt awake in bed. Assessment completed and medications given. VS stable. A&O x4. Pt stated pain as a 8 out of 10. PRN tylenol given at this time. Pt denies any further needs at this time. Call light in reach and bed in lowest position.

## 2022-02-10 NOTE — FLOWSHEET NOTE
02/10/22 0240   Vital Signs   Temp 98.1 °F (36.7 °C)   Temp Source Oral   Pulse 84   Heart Rate Source Monitor   Resp 16   /72   BP Location Right upper arm   Level of Consciousness Alert (0)   MEWS Score 1     Pt awake in bed. VS as shown above. Pt denies any further needs at this time. Call light in reach and bed in lowest position.

## 2022-02-10 NOTE — FLOWSHEET NOTE
02/10/22 1513   Vital Signs   Temp 98.2 °F (36.8 °C)   Temp Source Oral   Pulse 80   Heart Rate Source Monitor   Resp 18   /68   BP Location Left upper arm   Patient Position Semi fowlers   Level of Consciousness Alert (0)   MEWS Score 1   Patient Currently in Pain Denies   Oxygen Therapy   SpO2 96 %   Pulse Oximeter Device Mode Intermittent   Pulse Oximeter Device Location Finger   O2 Device Nasal cannula   O2 Flow Rate (L/min) 2 L/min   Patient resting. Denies any needs. Will continue to monitor.

## 2022-02-10 NOTE — PROGRESS NOTES
Pt refuses to turn. Pt states with all the fluid he has retained laying on his side makes it hard to breath.  Call light in reach and bed in lowest positon

## 2022-02-10 NOTE — CONSULTS
[facility] Wound Ostomy Continence Nurse  Follow-up Progress Note       NAME:  [address] RECORD NUMBER:  [id number]  AGE:  49 y.o. GENDER:  male  :  1965  TODAY'S DATE:  2/10/2022    Subjective: Pt just returned from HD, alert and oriented. Wound Identification:  Wound Type: pressure and non-healing surgical  Contributing Factors: edema, chronic pressure, decreased mobility and shear force        Patient Goal of Care:  [x] Wound Healing  [] Odor Control  [] Palliative Care  [] Pain Control   [] Other:     Objective:    /69   Pulse 84   Temp 97.7 °F (36.5 °C) (Oral)   Resp 16   Ht 6' 2\" (1.88 m)   Wt 216 lb 14.9 oz (98.4 kg)   SpO2 97%   BMI 27.85 kg/m²   Ismael Risk Score: Ismael Scale Score: 11  Assessment:   Measurements:  Wound 21 #72, Right Knee Cluster, Pressure Injury, Stage 2, Onset 21 (Active)   Wound Image   22 1019   Wound Etiology Pressure Stage  2 02/10/22 0741   Dressing Status Clean;Dry; Intact 02/10/22 0741   Wound Cleansed Vashe 02/10/22 0741   Dressing/Treatment Other (comment) 22 1255   Dressing Change Due 02/09/22 02/10/22 0741   Wound Length (cm) 6 cm 22 1019   Wound Width (cm) 10 cm 22 1019   Wound Depth (cm) 0.1 cm 22 1019   Wound Surface Area (cm^2) 60 cm^2 22 1019   Change in Wound Size % (l*w) -49251.11 22 1019   Wound Volume (cm^3) 6 cm^3 22 1019   Wound Healing % -57955 22 1019   Distance Tunneling (cm) 0 cm 02/10/22 0741   Undermining Maxium Distance (cm) 0 02/10/22 07   Wound Assessment Other (Comment) 02/10/22 0741   Drainage Amount Small 02/10/22 0741   Drainage Description Serosanguinous 02/10/22 0741   Odor None 02/10/22 0741   Chetna-wound Assessment Intact 02/10/22 0741   Number of days: 230       Wound 21 #80, Left lower leg, pressure, stage 4, onset 2021 (Active)   Wound Image   22 1122   Wound Etiology Pressure Stage  4 02/10/22 3660   Dressing Status Clean;Dry; Intact 02/10/22 0741   Wound Cleansed Vashe 02/10/22 0741   Dressing/Treatment Foam 02/10/22 0741   Offloading for Diabetic Foot Ulcers Offloading boot 02/10/22 0741   Dressing Change Due 02/09/22 02/08/22 2200   Wound Length (cm) 1.5 cm 01/21/22 1019   Wound Width (cm) 0.6 cm 01/21/22 1019   Wound Depth (cm) 0.1 cm 01/21/22 1019   Wound Surface Area (cm^2) 0.9 cm^2 01/21/22 1019   Change in Wound Size % (l*w) -650 01/21/22 1019   Wound Volume (cm^3) 0.09 cm^3 01/21/22 1019   Wound Healing % -650 01/21/22 1019   Distance Tunneling (cm) 0 cm 02/10/22 0741   Undermining Maxium Distance (cm) 0 02/10/22 0741   Wound Assessment Other (Comment) 02/10/22 0741   Drainage Amount Scant 02/10/22 0741   Drainage Description Serosanguinous 02/10/22 0741   Odor None 02/10/22 0741   Chetna-wound Assessment Intact 02/10/22 0741   Number of days: 97       Wound 11/05/21 # 81, mid-back, Surgical, full thickness, onset June 2015 (Active)   Wound Image   01/07/22 1122   Wound Etiology Surgical 02/10/22 0741   Dressing Status Clean;Dry; Intact 02/10/22 0741   Wound Cleansed Cleansed with saline 02/10/22 0741   Dressing/Treatment Foam 02/10/22 0741   Dressing Change Due 02/09/22 02/10/22 0741   Wound Length (cm) 9.2 cm 01/21/22 1019   Wound Width (cm) 6.5 cm 01/21/22 1019   Wound Depth (cm) 0.2 cm 01/21/22 1019   Wound Surface Area (cm^2) 59.8 cm^2 01/21/22 1019   Change in Wound Size % (l*w) -44.86 01/21/22 1019   Wound Volume (cm^3) 11.96 cm^3 01/21/22 1019   Wound Healing % 52 01/21/22 1019   Distance Tunneling (cm) 0 cm 02/10/22 0741   Undermining Maxium Distance (cm) 0 02/10/22 0741   Wound Assessment Exposed hardware;Pink/red;Bleeding 02/10/22 0741   Drainage Amount Large 02/10/22 0741   Drainage Description Sanguinous 02/10/22 0741   Odor Malodorous/putrid 02/10/22 0741   Chetna-wound Assessment Blanchable erythema 02/10/22 0741   Number of days: 97       Wound 01/31/22 Coccyx Large open area present to coccyx and buttocks area. Non-blanchable redness. (Active)   Dressing Status Clean;Dry; Intact 02/10/22 0741   Wound Cleansed Cleansed with saline 02/10/22 0741   Dressing/Treatment Foam 02/10/22 0741   Dressing Change Due 02/09/22 02/09/22 1623   Wound Assessment Dry;Erythema 02/10/22 0741   Drainage Amount Small 02/10/22 0741   Drainage Description Thin 02/10/22 0741   Odor Malodorous/putrid 02/10/22 0741   Chetna-wound Assessment Non-blanchable erythema 02/10/22 0741   Number of days: 10   right knee cluster:  Scattered dry black scbas, partial thickness and full thickness skin loss. chronic. Improved    Left lateral LE:  Distal3. 5x1x0.1cm, 90% black eschar, 10% open pink tissue. Proximal area now open, new. 3.5x2x0.1cm, 80% pink, 20% nonblanchable purple. Surrounding skin dusky purple. Along old incision line    Sacrum:  Small open bleeding areas secondary to shearing. Back wound:  chronic exposed hardware, unchanged, hypergranular, bleeding tissue around most proximal screw. No odor or purulence noted. Large amount of serosanguinous drainage        Response to treatment:  Well tolerated by patient. Pain Assessment:  Severity:  0 / 10  Quality of pain: N/A  Wound Pain Timing/Severity: none  Premedicated: N/A  Plan: Contimnue current plan. Alginate ag and foam to LLE. Plan of Care: Wound 11/05/21 #80, Left lower leg, pressure, stage 4, onset 11/02/2021-Dressing/Treatment: Foam  Wound 11/05/21 # 81, mid-back, Surgical, full thickness, onset June 2015-Dressing/Treatment: Foam  Wound 06/25/21 #72, Right Knee Cluster, Pressure Injury, Stage 2, Onset 6/22/21-Dressing/Treatment:  (Kerlix)  Wound 01/31/22 Coccyx Large open area present to coccyx and buttocks area. Non-blanchable redness.-Dressing/Treatment: Foam    Specialty Bed Required : Yes   [] Low Air Loss   [] Pressure Redistribution  [x] Fluid Immersion  [] Bariatric  [] Total Pressure Relief  [] Other:     Current Diet: ADULT DIET;  Regular; 4 carb choices (60 gm/meal); 1500 ml  ADULT ORAL NUTRITION SUPPLEMENT; Breakfast, Lunch, Dinner;  Low Calorie/High Protein Oral Supplement  Dietician consult:  Yes    Discharge Plan:  Placement for patient upon discharge: home with support   Patient appropriate for Outpatient [address]: Yes    Referrals:  []   [x] [address] [facility]  [x] Supplies  [] Other    Patient/Caregiver Teaching:  Level of patient/caregiver understanding able to:   [] Indicates understanding       [] Needs reinforcement  [] Unsuccessful      [x] Verbal Understanding  [] Demonstrated understanding       [] No evidence of learning  [] Refused teaching         [] N/A       Electronically signed by Kaylin Hickman RN, CWOCN on 2/10/2022 at 2:02 PM

## 2022-02-10 NOTE — FLOWSHEET NOTE
Treatment time: 3 hours  Net UF: 3000 ml     Pre weight: 101.4 kg   Post weight: 98.4 kg  EDW: TBD kg     [facility]  [facility] function: Good with  ml/min     Medications or blood products given: Heparin for catheter dwells     Regular outpatient schedule: SUDHA     Summary of response to treatment:      02/10/22 0825 02/10/22 1126   Vital Signs   /63 (!) 121/45   Temp 98.3 °F (36.8 °C) 98 °F (36.7 °C)   Pulse 73 94   Resp 18 18   Weight 223 lb 8.7 oz (101.4 kg) 216 lb 14.9 oz (98.4 kg)   Percent Weight Change 0 -2.96   Pain Assessment   Pain Assessment 0-10 0-10   Pain Level 0 0   Post-Hemodialysis Assessment   Post-Treatment Procedures  --  Blood returned;Catheter capped, clamped and heparinized x 2 ports   Machine Disinfection Process  --  Exterior Machine Disinfection   Rinseback Volume (ml)  --  400 ml   Total Liters Processed (l/min)  --  42 l/min   Dialyzer Clearance  --  Moderately streaked   Duration of Treatment (minutes)  --  181 minutes   Heparin amount administered during treatment (units)  --  0 units   Hemodialysis Intake (ml)  --  400 ml   Hemodialysis Output (ml)  --  3400 ml   NET Removed (ml)  --  3000 ml   Tolerated Treatment  --  Good   Copy of dialysis treatment record placed in chart, to be scanned into EMR.  Report called to Kyle Baker RN.

## 2022-02-10 NOTE — PROGRESS NOTES
PRN tylenol administered for 8/10 headache pain. Patient states his head has been bothering him since he got dialyzed this morning.

## 2022-02-10 NOTE — FLOWSHEET NOTE
02/10/22 0659   Vital Signs   Temp 98.5 °F (36.9 °C)   Temp Source Oral   Pulse 89   Heart Rate Source Monitor   Resp 16   /78   BP Location Right upper arm   Patient Position Semi fowlers   Level of Consciousness Alert (0)   MEWS Score 1   Patient Currently in Pain Denies   BP Upper/Lower Upper   Oxygen Therapy   SpO2 97 %   O2 Device Nasal cannula   O2 Flow Rate (L/min) 2 L/min   Patient is resting showing no s/s of distress. Patient is alert and oriented. Meds were HELD for dialysis, see MAR. Patient is denying any needs. Bed is in lowest position and call light is within reach. Will continue to monitor. Shift assessment complete, see flowsheets.

## 2022-02-10 NOTE — PROGRESS NOTES
Nephrology Progress Note   [facility]      This patient is a 49year old male whom we are following for SUDHA on CKD. Subjective:  Feels fine    HD on 2/10 w 3l UF, post wt 98.4 kg   HD on 2/9 w 3l UF, post wt 104.2 kg     Family History: No family at bedside  ROS: No nausea or vomiting      Vitals:  /68   Pulse 80   Temp 98.2 °F (36.8 °C) (Oral)   Resp 18   Ht 6' 2\" (1.88 m)   Wt 216 lb 14.9 oz (98.4 kg)   SpO2 96%   BMI 27.85 kg/m²   I/O last 3 completed shifts: In: 3073 [P.O.:810; I.V.:10]  Out: 5950 [Urine:2550]  I/O this shift:   In: 960 [P.O.:560]  Out: 3575 [Urine:175]    Physical Exam:  Gen: alert, awake  Cardiovascular:  S1, S2 without m/r/g 3+ lower extremity edema; abd wall edema and in meeta   Respiratory: decreased   Abdomen:  soft, nt, nd,   Neuro/Psy: AAoriented times 3 ; moves all 4 ext    Medications:   magnesium oxide  800 mg Oral BID    insulin glargine  33 Units SubCUTAneous BID    insulin lispro  7 Units SubCUTAneous TID WC    ciprofloxacin  500 mg Oral 2 times per day    hydrALAZINE  10 mg Oral BID    menthol-zinc oxide   Topical BID    [Held by provider] apixaban  5 mg Oral BID    [Held by provider] aspirin  81 mg Oral Nightly    cetirizine  10 mg Oral Daily    docusate sodium  100 mg Oral BID    ferrous sulfate  325 mg Oral Daily with breakfast    budesonide-formoterol  2 puff Inhalation BID    metoprolol succinate  25 mg Oral BID    montelukast  10 mg Oral Daily    pantoprazole  40 mg Oral QAM AC    polyethylene glycol  17 g Oral Daily    senna  2 tablet Oral Nightly    traZODone  50 mg Oral Nightly    insulin lispro  0-12 Units SubCUTAneous TID WC    insulin lispro  0-6 Units SubCUTAneous Nightly    sodium chloride flush  5-40 mL IntraVENous 2 times per day         Labs:  Recent Labs     02/08/22  0505 02/09/22  0728 02/10/22  0549   WBC 7.8 7.1 7.5   HGB 10.5* 10.8* 10.6*   HCT 32.6* 33.8* 33.5*   MCV 75.9* 76.3* 75.9*    197 155     Recent Labs     02/08/22  0505 02/09/22  0728 02/10/22  0549   * 134* 133*   K 3.8 3.1* 4.2   CL 86* 89* 91*   CO2 31 34* 29   GLUCOSE 235* 163* 141*   PHOS 3.5 4.0 4.5   * 101* 87*   CREATININE 1.3 1.2 1.3   LABGLOM 57* >60 57*   GFRAA >60 >60 >60           Assessment/      Acute Kidney Injury:    - Etiology:  Decompensated heart failure with tense abdominal wall edema and increased intra-abdominal pressure causing decreased renal blood flow and responsiveness to diuretics   HD started from 2/9 d/t ongoing volume overload and worsening azotemia      Chronic Kidney Disease:  Stage 3  - Multiple episodes of SUDHA, previously needing HD as outpatient  - Last Scr was 1 but BUN 61  - Follows with Dr. Brian Centeno in office     Hyponatremia- Hypervolemic   - Responding to diuresis     -CHF with reduced EF              Decompensated but respiratory status is ok     -Chronic dependent lymphedema in the setting of paraplegia     -Neurogenic bladder, history of self-catheterization every 4 hours              SSI with langley     -Hypokalemia - prn replacement     Plan/   -HD next on 2/12 as ordered  - IR consulted for TDC, temp vasc cath placed  -cont iv lasix 80 mg daily while in hospital   -OP HDU arrangement, d/w Barbara from -has been accepted at [facility]. Would put him on twice a week HD mainly for volume control  -lasix drip and Spironolactone was stopped on 2/8 by IM  - Monitor electrolytes and renal function.   - Fluid restriction and low sodium diet. Please do not hesitate to contact me at [phone] if with questions. Thank you! Lisa Templeton MD  [facility] [employer]  2/10/2022

## 2022-02-10 NOTE — PLAN OF CARE
Nutrition Problem #1: Increased nutrient needs  Intervention: Food and/or Nutrient Delivery: Continue Current Diet,Continue Oral Nutrition Supplement  Nutritional Goals: patient will consume 75% or greater of meals on ADULT DIET;  Regular; 4 carb choices per meal x 3 meals per day + accept and consume 75% or greater of Ensure HP with meals without additional lab/fluid disturbances; patient will adhere to 1500 ml daily FR;

## 2022-02-10 NOTE — FLOWSHEET NOTE
Treatment time: 2 hours  Net UF: 3000 ml     Pre weight: 107.2 kg   Post weight: 104.2 kg  EDW: TBD kg     [facility]  [facility] function: Good with  ml/min     Medications or blood products given: Heparin for catheter dwells     Regular outpatient schedule: SUDHA     Summary of response to treatment:      02/09/22 1705 02/09/22 1902   Vital Signs   /74 136/75   Temp 96.4 °F (35.8 °C) 96.8 °F (36 °C)   Pulse 84 97   Resp 18 18   Weight 236 lb 5.3 oz (107.2 kg) 229 lb 11.5 oz (104.2 kg)   Weight Method Actual;Bed scale  --    Percent Weight Change 0.78 -2.8   Pain Assessment   Pain Assessment  --  0-10   Pain Level  --  0   Post-Hemodialysis Assessment   Post-Treatment Procedures  --  Blood returned;Catheter capped, clamped and heparinized x 2 ports   Machine Disinfection Process  --  Acid/Vinegar Clean;Heat Disinfect; Exterior Machine Disinfection   Rinseback Volume (ml)  --  400 ml   Total Liters Processed (l/min)  --  23.3 l/min   Dialyzer Clearance  --  Moderately streaked   Duration of Treatment (minutes)  --  120 minutes   Heparin amount administered during treatment (units)  --  0 units   Hemodialysis Intake (ml)  --  400 ml   Hemodialysis Output (ml)  --  3400 ml   NET Removed (ml)  --  3000 ml   Tolerated Treatment  --  Fair   Copy of dialysis treatment record placed in chart, to be scanned into EMR.  Report called to LIONEL Prieto.

## 2022-02-10 NOTE — CARE COORDINATION
This RN CM received call from [facility] admissions stating that the Pt has an OP HD set up. Yolischeikh Early:  Phone: [phone]  Fax: [phone]  Tues. -Thurs. -Sat.  11am chair time  Start date: 02/15

## 2022-02-10 NOTE — PROGRESS NOTES
Comprehensive Nutrition Assessment    Type and Reason for Visit:  Reassess    Nutrition Recommendations/Plan:   1. Contiue the 4 CCC diet with 1500 FR   2. Placed order back in for Enusre HP TID    Nutrition Assessment:    Pt improving from a nutritional standpoint AEB he has had significant amounts of fluid removed form UOP and HD cath > 5000 mls in the last 24 hours in whic his weight has reduced, he had reduced meal intakes but accepting the ensure HP well . Remains at risk for further nutritional compromise r/t his wounds . Will continue current diet and supps         Malnutrition Assessment:  Malnutrition Status: At risk for malnutrition (Comment)    Context:  Chronic Illness     Findings of the 6 clinical characteristics of malnutrition:  Energy Intake:  No significant decrease in energy intake  Weight Loss:  Unable to assess (d/t pt admitted with fluid overload + possible differences in bed scale weight/mattresses)     Body Fat Loss:  Unable to assess (difficult to assess d/t fluid accumulation)     Muscle Mass Loss:  Unable to assess (difficult to assess d/t disuse atrophy r/t paraplegia + fluid accumulation)    Fluid Accumulation:  7 - Severe  (Generalized +3 pitting; BUE +1; BLE +3-4; perineal, sacral +4)   Strength:  Not Performed    Estimated Daily Nutrient Needs:  Energy (kcal):  9551-9335 kcals based on 20-23 kcals/kg/CBW; Weight Used for Energy Requirements:  Current     Protein (g):  121-138 g protein based on 1.4-1.6 g/kg/IBW (using 86.4 kg);  Weight Used for Protein Requirements:  Ideal        Fluid (ml/day):  1500 ml daily FR; Method Used for Fluid Requirements:  Other (Comment) (daily FR)      Nutrition Related Findings:  had HD cath placed and HD output 3400 mls today; UO=2.7L the past 24H; intakes were reduced past 24 hrs ensure has been offered with good acceptance ; weight loss expected      Wounds:  Multiple,Pressure Injury,Stage IV,Full Thickness,Stage II (LLE stage 4 PI, mid back surgical full thickness, R knee cluster PI stage 2, large open area coccyx/buttocks- nonblanchable redness)       Current Nutrition Therapies:    ADULT DIET; Regular; 4 carb choices (60 gm/meal); 1500 ml  ADULT ORAL NUTRITION SUPPLEMENT; Breakfast, Lunch, Dinner; Low Calorie/High Protein Oral Supplement    Anthropometric Measures:  · Height: 6' 2\" (188 cm)  · Current Body Weight: 229 lb 1 oz (103.9 kg)   · Admission Body Weight: 223 lb (101.2 kg)    · Usual Body Weight: 274 lb 3.2 oz (124.4 kg) (obtained 1/31; actual weight, bed scale (pt admitted with fluid overload + possible differences in bed scales/mattresses))     · Ideal Body Weight: 190 lbs; % Ideal Body Weight 117.4 %   · BMI: 29.4  · Adjusted Body Weight: 253; Amputation,Paraplegia   · Adjusted BMI: 32.4    · BMI Categories: Overweight (BMI 25.0-29. 9)       Nutrition Diagnosis:   · Increased nutrient needs related to increase demand for energy/nutrients,renal dysfunction,cardiac dysfunction as evidenced by wounds,other (comment),localized or generalized fluid accumulation,lab values (hx of paraplegia, SUDHA, acute on chronic CHF)      Nutrition Interventions:   Food and/or Nutrient Delivery:  Continue Current Diet,Continue Oral Nutrition Supplement  Nutrition Education/Counseling:  No recommendation at this time   Coordination of Nutrition Care:  Continue to monitor while inpatient    Goals:  patient will consume 75% or greater of meals on ADULT DIET;  Regular; 4 carb choices per meal x 3 meals per day + accept and consume 75% or greater of Ensure HP with meals without additional lab/fluid disturbances; patient will adhere to 1500 ml daily FR;       Nutrition Monitoring and Evaluation:   Behavioral-Environmental Outcomes:  None Identified   Food/Nutrient Intake Outcomes:  Food and Nutrient Intake,Supplement Intake  Physical Signs/Symptoms Outcomes:  Biochemical Data,Weight,Nutrition Focused Physical Findings,Fluid Status or Edema,Hemodynamic Status Discharge Planning:     Too soon to determine     Electronically signed by Jade Méndez RD, LD on 2/9/22 at 8:49 PM EST    Contact: [phone]

## 2022-02-10 NOTE — FLOWSHEET NOTE
02/10/22 1156   Vital Signs   Temp 97.7 °F (36.5 °C)   Temp Source Oral   Pulse 93   Heart Rate Source Monitor   Resp 16   /71   BP Location Right upper arm   Patient Position Semi fowlers   Level of Consciousness Alert (0)   MEWS Score 1   Patient Currently in Pain Denies   Oxygen Therapy   SpO2 99 %   Pulse Oximeter Device Mode Intermittent   Pulse Oximeter Device Location Finger   O2 Device Nasal cannula   O2 Flow Rate (L/min) 2 L/min   Patient returned from dialysis.  Meds given

## 2022-02-11 NOTE — PROGRESS NOTES
Pt arrived for image guided tunneled dialysis catheter insertion right. Procedure explained including the risk and benefits of the procedure. All questions answered. Pt verbalizes understanding of the procedure and states no more questions. Consent confirmed. Vital signs stable. Labs, allergies, medications, and code status reviewed. No contraindications noted.      Vital Signs  Vitals:    02/11/22 1415   BP: 103/61   Pulse: 85   Resp: 18   Temp: 97.1 °F (36.2 °C)   SpO2: 98%     Pre Cleve Score  1 - Able to move 2 extremities voluntarily on command  2 - BP+/- 20mmHg of normal  2 - Fully awake  1 - Needs oxygen to maintain oxygen saturation >90%  2 - Able to breathe deeply and cough freely    Allergies  Benadryl [diphenhydramine hcl], Keflex [cephalexin], Statins, Cephalexin, Morphine, Penicillins, and Sulfa antibiotics (allergies)    Labs  Lab Results   Component Value Date    INR 1.93 (H) 02/09/2022    PROTIME 22.4 (H) 02/09/2022     Lab Results   Component Value Date    CREATININE 1.3 02/11/2022    BUN 66 (H) 02/11/2022     (L) 02/11/2022    GFR >60 05/21/2013    K 4.3 02/11/2022    CL 88 (L) 02/11/2022    CO2 27 02/11/2022     Lab Results   Component Value Date    WBC 6.6 02/11/2022    HGB 10.3 (L) 02/11/2022    HCT 32.7 (L) 02/11/2022    MCV 77.1 (L) 02/11/2022    PLT 94 (L) 02/11/2022

## 2022-02-11 NOTE — PROGRESS NOTES
Hospitalist Progress Note      PCP: Joseph Seth MD    Date of Admission: 1/30/2022      49 y.o. male with paraplegia, coronary artery disease, ischemic cardiomyopathy with ejection fraction 25%, hypertension, COPD, history of PE, status post right BKA, chronic left lower extremity and sacral wounds followed by wound care center, CKD, Gitelman syndrome who presented to [facility] ED with complaint of fluid overload. Subjective:       Ongoing marked third spacing of fluids - improving slowly. Temp vas cath placed 2/9 Right IJ and UF started. Eliquis and ASA on hold as of 2/8 for tunneled line placement.            Medications:  Reviewed    Infusion Medications    dextrose Stopped (02/03/22 1134)    sodium chloride 25 mL (02/09/22 0838)     Scheduled Medications    magnesium oxide  800 mg Oral BID    insulin glargine  33 Units SubCUTAneous BID    insulin lispro  7 Units SubCUTAneous TID WC    ciprofloxacin  500 mg Oral 2 times per day    hydrALAZINE  10 mg Oral BID    menthol-zinc oxide   Topical BID    [Held by provider] apixaban  5 mg Oral BID    [Held by provider] aspirin  81 mg Oral Nightly    cetirizine  10 mg Oral Daily    docusate sodium  100 mg Oral BID    ferrous sulfate  325 mg Oral Daily with breakfast    budesonide-formoterol  2 puff Inhalation BID    metoprolol succinate  25 mg Oral BID    montelukast  10 mg Oral Daily    pantoprazole  40 mg Oral QAM AC    polyethylene glycol  17 g Oral Daily    senna  2 tablet Oral Nightly    traZODone  50 mg Oral Nightly    insulin lispro  0-12 Units SubCUTAneous TID WC    insulin lispro  0-6 Units SubCUTAneous Nightly    sodium chloride flush  5-40 mL IntraVENous 2 times per day     PRN Meds: potassium chloride, magnesium sulfate, albuterol, glucose, glucagon (rDNA), dextrose, dextrose bolus (hypoglycemia) **OR** dextrose bolus (hypoglycemia), albuterol, cyclobenzaprine, sodium chloride flush, sodium chloride, ondansetron GLUCOSEU >=1000 mg/dL 08/31/2010       Radiology:  IR NONTUNNELED VASCULAR CATHETER > 5 YEARS   Final Result   Successful ultrasound guided non-tunneled HD catheter placement. Catheter is   okay for use. XR CHEST PORTABLE   Final Result   Increasing right basilar pleural and parenchymal disease consistent with   atelectasis or pneumonia and a small pleural effusion      Improved left basilar subsegmental atelectasis         IR TUNNELED CVC PLACE WO SQ PORT/PUMP > 5 YEARS    (Results Pending)           Assessment/Plan:    Active Hospital Problems    Diagnosis     Fluid overload [E87.70]     Acute on chronic combined systolic and diastolic CHF (congestive heart failure) ([facility]) [I50.43]     SUDHA (acute kidney injury) ([facility] [address] [N17.9]            #Acute on chronic systolic CHF  #Ischemic cardiomyopathy   #Anasarca  - EF 25-30% on most recent limited echo 1/10/22  - prior to admission he was taking Demadex 100 mg daily and metolazone 5 mg daily  - seen by nephro- now on Lasix gtt.  Monitor weights, UOP, and BMP  - cont toprol XL   - no ACEi/ARB or aldactone with renal issues   - lasix gtt stopped 2/8 due to BUN jumping to >100.    - nephrology discussed dialysis with pt - still grossly fluid overloaded and third spacing   - eliquis and asa held starting 2/8 for line placement.     - temp vas cath placed 2/9 and UF started.        #Hypoxia  - suspect 2/2 fluid overload  - currently on 2L - wean as able   - diurese as above, start IS, wean O2        #CKD3a  - f/b Dr. Chari Floresoter  - Baseline Crt 1.0  - Crt 1.4 on admit--> 1.3 --1.4-- 1.3   - nephro c/s  - monitor BMP daily   - not meeting SUDHA criteria       #Neurogenic bladder  - practices ISC at home, now with langley cath in place for fluid management   - UA sent on admit with WBC, urine culture e.coli   Continue oral cipro      #CAD  - with history of PCI  - he denies chest pain now   - elevated troponin noted- chronic on review of past labs  - cont ASA, statin, toprol

## 2022-02-11 NOTE — PROGRESS NOTES
02/10/22 1925   Treatment   Treatment Type MDI   $Treatment Type $Inhaled Therapy/Meds   Medications Albuterol   Pre-Tx Pulse 76   Pre-Tx Resps 18   Breath Sounds Pre-Tx MAXIMUS Diminished   Breath Sounds Pre-Tx LLL Diminished   Breath Sounds Pre-Tx RUL Diminished   Breath Sounds Pre-Tx RML Diminished   Breath Sounds Pre-Tx RLL Diminished   Breath Sounds Post-Tx MAXIMUS Diminished   Breath Sounds Post-Tx LLL Diminished   Breath Sounds Post-Tx RUL Diminished   Breath Sounds Post-Tx RML Diminished   Breath Sounds Post-Tx RLL Diminished   Position Semi-Mejía's   Tx Tolerance Well   Is patient on O2?  Y   Oxygen Therapy/Pulse Ox   O2 Therapy Oxygen   O2 Device Nasal cannula   O2 Flow Rate (L/min) 3 L/min   Resp 18   SpO2 97 %

## 2022-02-11 NOTE — PROGRESS NOTES
Nephrology Progress Note   [facility]      This patient is a 49year old male whom we are following for SUDHA on CKD. Subjective: The patient was seen and examined. Sleepy but arousable. Had HD yesterday with 3L of UF. Family History: No family at bedside  ROS: No nausea or vomiting      Vitals:  BP 95/60   Pulse 73   Temp 96.5 °F (35.8 °C) (Axillary)   Resp 18   Ht 6' 2\" (1.88 m)   Wt 230 lb 6.1 oz (104.5 kg)   SpO2 98%   BMI 29.58 kg/m²   I/O last 3 completed shifts: In: 4257 [P.O.:1430; I.V.:10]  Out: 7725 [Urine:925]  I/O this shift:  In: 180 [P.O.:180]  Out: -     Physical Exam:  Physical Exam  Vitals reviewed. Constitutional:       General: He is not in acute distress. HENT:      Head: Normocephalic and atraumatic. Eyes:      General: No scleral icterus. Conjunctiva/sclera: Conjunctivae normal.   Cardiovascular:      Rate and Rhythm: Normal rate. Pulmonary:      Effort: Pulmonary effort is normal. No respiratory distress. Abdominal:      Comments: (+) abdominal wall edema, non-tender   Musculoskeletal:      Right lower leg: Edema present. Left lower leg: Edema present.        Access: RIJ vas cath      Medications:   furosemide  80 mg IntraVENous Daily    magnesium oxide  800 mg Oral BID    insulin glargine  33 Units SubCUTAneous BID    insulin lispro  7 Units SubCUTAneous TID WC    ciprofloxacin  500 mg Oral 2 times per day    hydrALAZINE  10 mg Oral BID    menthol-zinc oxide   Topical BID    [Held by provider] apixaban  5 mg Oral BID    [Held by provider] aspirin  81 mg Oral Nightly    cetirizine  10 mg Oral Daily    docusate sodium  100 mg Oral BID    ferrous sulfate  325 mg Oral Daily with breakfast    budesonide-formoterol  2 puff Inhalation BID    metoprolol succinate  25 mg Oral BID    montelukast  10 mg Oral Daily    pantoprazole  40 mg Oral QAM AC    polyethylene glycol  17 g Oral Daily    senna  2 tablet Oral Nightly    traZODone  50 mg Oral Nightly    insulin lispro  0-12 Units SubCUTAneous TID WC    insulin lispro  0-6 Units SubCUTAneous Nightly    sodium chloride flush  5-40 mL IntraVENous 2 times per day         Labs:  Recent Labs     02/09/22  0728 02/10/22  0549 02/11/22  0555   WBC 7.1 7.5 6.6   HGB 10.8* 10.6* 10.3*   HCT 33.8* 33.5* 32.7*   MCV 76.3* 75.9* 77.1*    155 94*     Recent Labs     02/09/22  0728 02/10/22  0549 02/11/22  0517   * 133* 126*   K 3.1* 4.2 4.3   CL 89* 91* 88*   CO2 34* 29 27   GLUCOSE 163* 141* 244*   PHOS 4.0 4.5 4.2   * 87* 66*   CREATININE 1.2 1.3 1.3   LABGLOM >60 57* 57*   GFRAA >60 >60 >60          Assessment/      Acute Kidney Injury:    - Etiology:  Decompensated heart failure with tense abdominal wall edema and increased intra-abdominal pressure causing decreased renal blood flow and responsiveness to diuretics             HD started from 2/9 d/t ongoing volume overload and worsening azotemia                Chronic Kidney Disease:  Stage 3  - Multiple episodes of SUDHA, previously needing HD as outpatient  - Last Scr was 1 but BUN 61  - Follows with Dr. Guillermina Rodrigues in office     Hyponatremia- Hypervolemic      -CHF with reduced EF              Decompensated but respiratory status is ok     -Chronic dependent lymphedema in the setting of paraplegia     -Neurogenic bladder, history of self-catheterization every 4 hours              RDI with langley     -Hypokalemia - prn replacement     Plan/   -HD next tomorrow as ordered  -IR for [facility] placement. -D/C iv lasix   -OP HDU arrangement, has been accepted at [facility]. On twice a week HD mainly for volume control (Tuesday-Saturday). First treatment there scheduled on 2/15.   -Fluid restriction and low sodium diet. If able to place [facility] today, ok to be discharged tomorrow after dialysis from renal standpoint when ok with others. Please do not hesitate to contact me at [phone] if with questions. Thank you!     Janell moran, MD  [facility]  [address] [place] [address]. [facility]  2/11/2022

## 2022-02-11 NOTE — PROGRESS NOTES
Image guided tunneled dialysis catheter completed. Dr. Lennox Feliciano placed a 14.5 Welsh 27 cm Bard [employer]path LOT [id number] 04/30/2023 in the right chest tunneling and accessing the right IJ. Line aspirates and flushes with ease. Dialysis catheter secured with sutures. Surgical site dressing clean, dry, and intact. Each dialysis access lumen heparin locked with 2.0 ml of IV heparin each. Pt tolerated procedure without any signs or symptoms of distress. Vital signs stable. Report called to [facility] on PCU. Pt transported back to PCU in stable condition via bed by transport. Line ok to use per Lennox Feliciano.     Vital Signs  Vitals:    02/11/22 1549   BP: (!) 147/72   Pulse: 79   Resp: 15   Temp:    SpO2: 97%        Post Cleve Score  1 - Able to move 2 extremities voluntarily on command  2 - BP+/- 20mmHg of normal  1 - Needs oxygen to maintain oxygen saturation >90%  2 - Able to breathe deeply and cough freely  2 - Fully awake    Total  IV Sedation  0.5 mg Versed  50 mcg Fentanyl

## 2022-02-11 NOTE — FLOWSHEET NOTE
Shift assessment complete. See flowsheet for full assessment.  Pt denies any needs, call light within reach.       02/10/22 1938   Vital Signs   Temp 98.4 °F (36.9 °C)   Temp Source Oral   Pulse 79   Heart Rate Source Monitor   Resp 18   /62   BP Location Left upper arm   Patient Position Semi fowlers   BP Upper/Lower Upper   Oxygen Therapy   SpO2 98 %   O2 Device Nasal cannula   O2 Flow Rate (L/min) 3 L/min

## 2022-02-11 NOTE — PROGRESS NOTES
Shift assessment complete, morning medications given, will reassess BP for metoprolol administration around 11:00,patient resting with  complaints of pain 6 out of 10 in upper right neck,, will continue to monitor.  Aldo Mccarty RN

## 2022-02-11 NOTE — CARE COORDINATION
INTERDISCIPLINARY PLAN OF CARE CONFERENCE    Date/Time: 2/11/2022 3:46 PM  Completed by:  JENNIFER Covarrubias. Case Management      Patient Name:  Kenneth Rdz  YOB: 1965  Admitting Diagnosis: SUDHA (acute kidney injury) ([facility] [address] [N17.9]  Fluid overload [E87.70]  Acute on chronic combined systolic and diastolic CHF (congestive heart failure) ([facility] [address] [I50.43]  Anasarca associated with disorder of kidney [N04.9]  Decreased urine output [R34]     Admit Date/Time:  1/30/2022 12:16 PM    Chart reviewed. Interdisciplinary team contacted or reviewed plan related to patient progress and discharge plans. Disciplines included Case Management, Nursing, and Dietitian. Current Status:Ongoing. PT/OT recommendation for discharge plan of care: n/a    Expected D/C Disposition:  Home    Discharge Plan Comments: Chart review completed. Pt is off the floor per chart review. Per note from RN CM on 2/10/22, pt has an outpatient HD slot at [facility] Tuesday, Thursday and Saturday. Pt has home care through [facility]. CM will follow. Please notify CM if needs or concerns arise .     Home O2 in place on admit: No

## 2022-02-11 NOTE — FLOWSHEET NOTE
Pt appears to be resting comfortably. VSS. Per pt, no new needs at this time.         02/11/22 0136   Vital Signs   Temp 98.3 °F (36.8 °C)   Temp Source Oral   Pulse 63   Heart Rate Source Monitor   Resp 18   /74   BP Location Left lower arm   Patient Position Semi fowlers   BP Upper/Lower Lower   Oxygen Therapy   SpO2 96 %   O2 Device Nasal cannula   O2 Flow Rate (L/min) 3 L/min   Height and Weight   Weight 230 lb 6.1 oz (104.5 kg)   Weight Method Bed scale   BMI (Calculated) 29.6

## 2022-02-12 NOTE — PLAN OF CARE
Problem: Falls - Risk of:  Goal: Will remain free from falls  Description: Will remain free from falls  Outcome: Ongoing  Goal: Absence of physical injury  Description: Absence of physical injury  Outcome: Ongoing     Problem: Skin Integrity:  Goal: Will show no infection signs and symptoms  Description: Will show no infection signs and symptoms  Outcome: Ongoing  Goal: Absence of new skin breakdown  Description: Absence of new skin breakdown  Outcome: Ongoing  Goal: Status of oral mucous membranes will improve  Description: Status of oral mucous membranes will improve  Outcome: Ongoing  Goal: Skin integrity will be maintained  Description: Skin integrity will be maintained  Outcome: Ongoing     Problem: Nutrition  Goal: Optimal nutrition therapy  Outcome: Ongoing  Goal: Understanding of nutritional guidelines  Outcome: Ongoing     Problem: Pain:  Goal: Pain level will decrease  Description: Pain level will decrease  Outcome: Ongoing  Goal: Control of acute pain  Description: Control of acute pain  Outcome: Ongoing  Goal: Control of chronic pain  Description: Control of chronic pain  Outcome: Ongoing     Problem: OXYGENATION/RESPIRATORY FUNCTION  Goal: Patient will maintain patent airway  Outcome: Ongoing  Goal: Patient will achieve/maintain normal respiratory rate/effort  Description: Respiratory rate and effort will be within normal limits for the patient  Outcome: Ongoing     Problem: HEMODYNAMIC STATUS  Goal: Patient has stable vital signs and fluid balance  Outcome: Ongoing     Problem: FLUID AND ELECTROLYTE IMBALANCE  Goal: Fluid and electrolyte balance are achieved/maintained  Outcome: Ongoing     Problem: ACTIVITY INTOLERANCE/IMPAIRED MOBILITY  Goal: Mobility/activity is maintained at optimum level for patient  Outcome: Ongoing     Problem:  Activity:  Goal: Fatigue will decrease  Description: Fatigue will decrease  Outcome: Ongoing  Goal: Risk for activity intolerance will decrease  Description: Risk for activity intolerance will decrease  Outcome: Ongoing     Problem: Coping:  Goal: Ability to cope will improve  Description: Ability to cope will improve  Outcome: Ongoing     Problem: Fluid Volume:  Goal: Will show no signs or symptoms of fluid imbalance  Description: Will show no signs or symptoms of fluid imbalance  Outcome: Ongoing     Problem: Health Behavior:  Goal: Ability to manage health-related needs will improve  Description: Ability to manage health-related needs will improve  Outcome: Ongoing  Goal: Identification of resources available to assist in meeting health care needs will improve  Description: Identification of resources available to assist in meeting health care needs will improve  Outcome: Ongoing     Problem: Nutritional:  Goal: Ability to identify appropriate dietary choices will improve  Description: Ability to identify appropriate dietary choices will improve  Outcome: Ongoing     Problem: Physical Regulation:  Goal: Ability to maintain clinical measurements within normal limits will improve  Description: Ability to maintain clinical measurements within normal limits will improve  Outcome: Ongoing  Goal: Complications related to the disease process, condition or treatment will be avoided or minimized  Description: Complications related to the disease process, condition or treatment will be avoided or minimized  Outcome: Ongoing     Problem: Sensory:  Goal: General experience of comfort will improve  Description: General experience of comfort will improve  Outcome: Ongoing

## 2022-02-12 NOTE — FLOWSHEET NOTE
02/12/22 0914   Vitals   Temp 97.8 °F (36.6 °C)   Temp Source Oral   Pulse 83   Heart Rate Source Monitor   Resp 20   /69   BP Location Left upper arm   BP Upper/Lower Upper   BP Method Automatic   Level of Consciousness Alert (0)   MEWS Score 1   Cardiac Rhythm Sinus rhythm   Oxygen Therapy   SpO2 97 %   O2 Device Nasal cannula   O2 Flow Rate (L/min) 3 L/min   Shift assessment completed-see flow sheet. Patient in bed awake,alert and oriented x4. Vitals stable. Morning medications held at this time, plan for dialysis. Patient denies any needs at this time,call light within reach.

## 2022-02-12 NOTE — CARE COORDINATION
Tentative plan is for dc home tomorrow. TC to [facility] and she confirms that pt has an HD slot TTS arrival is 1100 for 1115 chair time. Koby Graham confirms that this is to start on this Tuesday 2/15.

## 2022-02-12 NOTE — PROGRESS NOTES
Resting quietly with eyes closed & respirations WNL on 3L NC. Call in easy reach. Continue to monitor closely.   Vanessa Horn RN

## 2022-02-12 NOTE — PROGRESS NOTES
Hospitalist Progress Note      PCP: Florina Verduzco MD    Date of Admission: 1/30/2022      49 y.o. male with paraplegia, coronary artery disease, ischemic cardiomyopathy with ejection fraction 25%, hypertension, COPD, history of PE, status post right BKA, chronic left lower extremity and sacral wounds followed by wound care center, CKD, Gitelman syndrome who presented to [facility] ED with complaint of fluid overload. Subjective:       Ongoing marked third spacing of fluids - improving slowly. Temp vas cath placed 2/9 Right IJ and UF started. Eliquis and ASA on hold as of 2/8 for tunneled line placement. Temp line removed and tunneled line placed 2/11.            Medications:  Reviewed    Infusion Medications    dextrose Stopped (02/03/22 1134)    sodium chloride 25 mL (02/09/22 4021)     Scheduled Medications    metoprolol succinate  25 mg Oral Dinner    magnesium oxide  800 mg Oral BID    insulin glargine  33 Units SubCUTAneous BID    insulin lispro  7 Units SubCUTAneous TID WC    ciprofloxacin  500 mg Oral 2 times per day    menthol-zinc oxide   Topical BID    [Held by provider] apixaban  5 mg Oral BID    [Held by provider] aspirin  81 mg Oral Nightly    cetirizine  10 mg Oral Daily    docusate sodium  100 mg Oral BID    ferrous sulfate  325 mg Oral Daily with breakfast    budesonide-formoterol  2 puff Inhalation BID    montelukast  10 mg Oral Daily    pantoprazole  40 mg Oral QAM AC    polyethylene glycol  17 g Oral Daily    senna  2 tablet Oral Nightly    traZODone  50 mg Oral Nightly    insulin lispro  0-12 Units SubCUTAneous TID WC    insulin lispro  0-6 Units SubCUTAneous Nightly    sodium chloride flush  5-40 mL IntraVENous 2 times per day     PRN Meds: potassium chloride, magnesium sulfate, albuterol, glucose, glucagon (rDNA), dextrose, dextrose bolus (hypoglycemia) **OR** dextrose bolus (hypoglycemia), albuterol, cyclobenzaprine, sodium chloride flush, sodium chloride, ondansetron **OR** ondansetron, acetaminophen **OR** acetaminophen      Intake/Output Summary (Last 24 hours) at 2/12/2022 1057  Last data filed at 2/12/2022 0509  Gross per 24 hour   Intake 220 ml   Output 500 ml   Net -280 ml       Physical Exam Performed:    /69   Pulse 83   Temp 97.8 °F (36.6 °C) (Oral)   Resp 20   Ht 6' 2\" (1.88 m)   Wt 212 lb 1.3 oz (96.2 kg)   SpO2 97%   BMI 27.23 kg/m²       Gen: No distress. Alert. Eyes: PERRL. No sclera icterus. No conjunctival injection. ENT: No discharge. Pharynx clear. Neck: Trachea midline. Normal thyroid. Resp: No accessory muscle use. No crackles. No wheezes. No rhonchi. No dullness on percussion. CV: Regular rate. Regular rhythm. No murmur or rub. 3 + edema.  Extending to the lower abdominal wall  GI: Non-tender. Non-distended. No masses. No organomegaly. Normal bowel sounds. No hernia.  Left lower quadrant ostomy bag  Skin: Warm and dry. No nodule on exposed extremities. No rash on exposed extremities. Lymph: No cervical LAD. No supraclavicular LAD. M/S: Right BKA  Neuro: Paraplegia   Psych: Oriented x 3. No anxiety or agitation. Labs:   Recent Labs     02/10/22  0549 02/11/22  0555 02/12/22  0510   WBC 7.5 6.6 7.7   HGB 10.6* 10.3* 10.7*   HCT 33.5* 32.7* 33.2*    94* 132*     Recent Labs     02/10/22  0549 02/11/22  0517 02/12/22  0510   * 126* 126*   K 4.2 4.3 4.8   CL 91* 88* 88*   CO2 29 27 27   BUN 87* 66* 79*   CREATININE 1.3 1.3 1.8*   CALCIUM 9.3 8.7 9.3   PHOS 4.5 4.2 4.5     No results for input(s): AST, ALT, BILIDIR, BILITOT, ALKPHOS in the last 72 hours. No results for input(s): INR in the last 72 hours. No results for input(s): Lesly Gala in the last 72 hours.     Urinalysis:      Lab Results   Component Value Date    NITRU Negative 01/30/2022    WBCUA  01/30/2022    BACTERIA 4+ 01/30/2022    RBCUA 3-4 01/30/2022    BLOODU TRACE-INTACT 01/30/2022    SPECGRAV 1.010 01/30/2022 GLUCOSEU Negative 01/30/2022    GLUCOSEU >=1000 mg/dL 08/31/2010       Radiology:  IR TUNNELED CVC PLACE WO SQ PORT/PUMP > 5 YEARS   Preliminary Result   Status post removal of temporary dialysis catheter followed by   fluoroscopically guided placement of right internal jugular tunneled dialysis   catheter as described above. IR NONTUNNELED VASCULAR CATHETER > 5 YEARS   Final Result   Successful ultrasound guided non-tunneled HD catheter placement. Catheter is   okay for use. XR CHEST PORTABLE   Final Result   Increasing right basilar pleural and parenchymal disease consistent with   atelectasis or pneumonia and a small pleural effusion      Improved left basilar subsegmental atelectasis                 Assessment/Plan:    Active Hospital Problems    Diagnosis     Fluid overload [E87.70]     Acute on chronic combined systolic and diastolic CHF (congestive heart failure) ([facility]) [I50.43]     SUDHA (acute kidney injury) ([facility] [address] [N17.9]            #Acute on chronic systolic CHF  #Ischemic cardiomyopathy   #Anasarca  - EF 25-30% on most recent limited echo 1/10/22  - prior to admission he was taking Demadex 100 mg daily and metolazone 5 mg daily  - seen by nephro- now on Lasix gtt.  Monitor weights, UOP, and BMP  - cont toprol XL   - no ACEi/ARB or aldactone with renal issues   - lasix gtt stopped 2/8 due to BUN jumping to >100.    - nephrology discussed dialysis with pt - still grossly fluid overloaded and third spacing   - eliquis and asa held starting 2/8 for line placement.     - temp vas cath placed 2/9 and UF started.        #Hypoxia  - suspect 2/2 fluid overload  - currently on 2L - wean as able   - diurese as above, start IS, wean O2        #CKD3a  - f/b Dr. Sharon Szymanski  - Baseline Crt 1.0  - Crt 1.4 on admit--> 1.3 --1.4-- 1.3   - nephro c/s  - monitor BMP daily   - not meeting SUDHA criteria       #Neurogenic bladder  - practices ISC at home, now with langley cath in place for fluid management   - UA sent on admit with WBC, urine culture e.coli   Continue oral cipro      #CAD  - with history of PCI  - he denies chest pain now   - elevated troponin noted- chronic on review of past labs  - cont ASA, statin, toprol        #History of PE  - cont Eliquis 5 mg BID - see above       #COPD  - no AE  - cont inhalers        #DM2  - cont Lantus and use SSI  - adjust lantus       #Right BKA       #Chronic wounds  - right knee, LLE, sacrum.  F/b [facility].  Wound RN c/s       #Gitelman syndrome   - home magnesium continued        #Chronic anemia  - Hgb stable   - cont PO iron         DVT Prophylaxis: Eliquis  Diet: ADULT DIET; Regular; 4 carb choices (60 gm/meal); Low Potassium (Less than 3000 mg/day); Low Phosphorus (Less than 1000 mg)  ADULT ORAL NUTRITION SUPPLEMENT; Breakfast, Lunch, Dinner; Low Calorie/High Protein Oral Supplement  Code Status: Full Code      PT/OT Eval Status: ordered      Dispo - cont care. OP dialysis spot Tuesday Feb 15th. Will need tunneled line before discharge. AC in hold as of 2/8. Temp line removed and Tunneled line placed 2/11. Plan to resume [facility] tomorrow and should be stable for discharge if dialysis arrangement are in order.          Aleksandar Yost MD

## 2022-02-12 NOTE — PROGRESS NOTES
Shift report given to nurse Rajani Demarco at bedside. Patient care handed off in stable condition at this time.   Fredi Ayala RN

## 2022-02-12 NOTE — FLOWSHEET NOTE
02/12/22 1130 02/12/22 1440   Treatment   Time On 1139  --    Time Off  --  1439   Vital Signs   BP (!) 105/55 (!) 115/57   Temp 97.6 °F (36.4 °C) 97.8 °F (36.6 °C)   Pulse 80 90   Resp 18 18   Weight 212 lb 1.3 oz (96.2 kg) 204 lb 9.4 oz (92.8 kg)   Post-Hemodialysis Assessment   NET Removed (ml)  --  3000 ml     Treatment time: 3 hours   Net UF: 3000ml    Pre weight: 96.2kg  Post weight: 92.8kg  EDW: N/A    Access used: central line  Access function: good    Medications or blood products given: N/A    Regular outpatient schedule: TTS    Summary of response to treatment: pt tolerated HD well, vital signs stable, was able to remove 3000ml. Copy of dialysis treatment record placed in chart, to be scanned into EMR.     Electronically signed by Lizette Cordoba RN on 2/12/2022 at 3:09 PM

## 2022-02-12 NOTE — PROGRESS NOTES
Nephrology Progress Note   [facility]      This patient is a 49year old male whom we are following for SUDHA on CKD. Subjective: The patient was seen and examined; he feels well today with no CP, SOB, nausea or vomiting. ROS: No fever or chills. Social: No family at bedside. Vitals:  BP (!) 105/55   Pulse 80   Temp 97.6 °F (36.4 °C)   Resp 18   Ht 6' 2\" (1.88 m)   Wt 212 lb 1.3 oz (96.2 kg)   SpO2 97%   BMI 27.23 kg/m²   I/O last 3 completed shifts: In: 640 [P.O.:640]  Out: 500 [Urine:250; Stool:250]  No intake/output data recorded. Physical Exam:  Physical Exam  Vitals reviewed. Constitutional:       General: He is not in acute distress. HENT:      Head: Normocephalic and atraumatic. Eyes:      General: No scleral icterus. Conjunctiva/sclera: Conjunctivae normal.   Cardiovascular:      Rate and Rhythm: Normal rate. Pulmonary:      Effort: Pulmonary effort is normal. No respiratory distress. Abdominal:      Comments: (+) abdominal wall edema, non-tender   Musculoskeletal:      Right lower leg: Edema present. Left lower leg: Edema present.        Access: R IJ TDC      Medications:   metoprolol succinate  25 mg Oral Dinner    magnesium oxide  800 mg Oral BID    insulin glargine  33 Units SubCUTAneous BID    insulin lispro  7 Units SubCUTAneous TID WC    ciprofloxacin  500 mg Oral 2 times per day    menthol-zinc oxide   Topical BID    [Held by provider] apixaban  5 mg Oral BID    [Held by provider] aspirin  81 mg Oral Nightly    cetirizine  10 mg Oral Daily    docusate sodium  100 mg Oral BID    ferrous sulfate  325 mg Oral Daily with breakfast    budesonide-formoterol  2 puff Inhalation BID    montelukast  10 mg Oral Daily    pantoprazole  40 mg Oral QAM AC    polyethylene glycol  17 g Oral Daily    senna  2 tablet Oral Nightly    traZODone  50 mg Oral Nightly    insulin lispro  0-12 Units SubCUTAneous TID WC    insulin lispro  0-6 Units SubCUTAneous

## 2022-02-12 NOTE — FLOWSHEET NOTE
02/12/22 1529   Vitals   Temp 98.5 °F (36.9 °C)   Temp Source Oral   Pulse 94   Heart Rate Source Monitor   Resp 18   /78   BP Location Left upper arm   BP Upper/Lower Upper   BP Method Manual   Patient Position Semi fowlers   Level of Consciousness Alert (0)   MEWS Score 1   Patient Currently in Pain No   Cardiac Rhythm Sinus rhythm   Oxygen Therapy   SpO2 96 %   O2 Flow Rate (L/min) 3 L/min   Patient back from Dialysis. Vitals stable. Medications given per order. Patient denies any needs at this time,call light within reach.

## 2022-02-13 NOTE — PROGRESS NOTES
Resting quietly with eyes closed & respirations WNL on 3L NC. Call in easy reach. Continue to monitor closely.   Brendon Thomas RN

## 2022-02-13 NOTE — PROGRESS NOTES
Patient was going to be discharged today and transportation was unable to be set up. Will be discharged home tomorrow. No other issues.

## 2022-02-13 NOTE — PROGRESS NOTES
Nursing handoff to [place], [facility] / [facility].   Brendon Thomas RN

## 2022-02-13 NOTE — PROGRESS NOTES
Nephrology Progress Note   [facility]. [facility]      This patient is a 49year old male whom we are following for SUDHA on CKD. Subjective: The patient was seen and examined; he feels well today with no CP, SOB, nausea or vomiting. ROS: No fever or chills. Social: No family at bedside. Vitals:  /72   Pulse 83   Temp 98.7 °F (37.1 °C) (Oral)   Resp 18   Ht 6' 2\" (1.88 m)   Wt 220 lb 11.2 oz (100.1 kg)   SpO2 99%   BMI 28.34 kg/m²   I/O last 3 completed shifts: In: 440 [P.O.:440]  Out: 4325 [Urine:475; Stool:450]  I/O this shift:  In: 240 [P.O.:240]  Out: -     Physical Exam:  Physical Exam  Vitals reviewed. Constitutional:       General: He is not in acute distress. HENT:      Head: Normocephalic and atraumatic. Eyes:      General: No scleral icterus. Conjunctiva/sclera: Conjunctivae normal.   Cardiovascular:      Rate and Rhythm: Normal rate. Pulmonary:      Effort: Pulmonary effort is normal. No respiratory distress. Abdominal:      Comments: (+) abdominal wall edema, non-tender   Musculoskeletal:      Right lower leg: Edema present. Left lower leg: Edema present.        Access: R IJ TDC      Medications:   metoprolol succinate  25 mg Oral Dinner    torsemide  100 mg Oral Daily    insulin glargine  33 Units SubCUTAneous BID    insulin lispro  7 Units SubCUTAneous TID WC    ciprofloxacin  500 mg Oral 2 times per day    menthol-zinc oxide   Topical BID    apixaban  5 mg Oral BID    aspirin  81 mg Oral Nightly    cetirizine  10 mg Oral Daily    docusate sodium  100 mg Oral BID    ferrous sulfate  325 mg Oral Daily with breakfast    budesonide-formoterol  2 puff Inhalation BID    montelukast  10 mg Oral Daily    pantoprazole  40 mg Oral QAM AC    polyethylene glycol  17 g Oral Daily    senna  2 tablet Oral Nightly    traZODone  50 mg Oral Nightly    insulin lispro  0-12 Units SubCUTAneous TID WC    insulin lispro  0-6 Units SubCUTAneous Nightly    sodium chloride flush  5-40 mL IntraVENous 2 times per day         Labs:  Recent Labs     02/11/22  0555 02/12/22  0510 02/13/22  0500   WBC 6.6 7.7 8.8   HGB 10.3* 10.7* 10.2*   HCT 32.7* 33.2* 32.1*   MCV 77.1* 76.1* 76.8*   PLT 94* 132* 94*     Recent Labs     02/11/22  0517 02/12/22  0510 02/13/22  0500   * 126* 129*   K 4.3 4.8 4.9   CL 88* 88* 92*   CO2 27 27 25   GLUCOSE 244* 145* 154*   PHOS 4.2 4.5 4.2   BUN 66* 79* 67*   CREATININE 1.3 1.8* 2.0*   LABGLOM 57* 39* 35*   GFRAA >60 48* 42*          Assessment/      Acute Kidney Injury:    - Etiology:  Decompensated heart failure with tense abdominal wall edema and increased intra-abdominal pressure causing decreased renal blood flow and responsiveness to diuretics             HD started from 2/9 d/t ongoing volume overload and worsening azotemia                Chronic Kidney Disease:  Stage 3  - Multiple episodes of SDUHA, previously needing HD as outpatient  - Last Scr was 1 but BUN 61  - Follows with Dr. Ar Gonzalez in office     Hyponatremia- Hypervolemic      -CHF with reduced EF              Decompensated but respiratory status is ok     -Chronic dependent lymphedema in the setting of paraplegia     -Neurogenic bladder, history of self-catheterization every 4 hours              GHR with langley     -Hypokalemia - prn replacement     Plan/     -HD again on Tuesday with UF as ordered  -Continue Torsemide 100 mg daily  -OP HDU arrangement, has been accepted at [facility]. On twice a week HD mainly for volume control (Tuesday-Saturday). First treatment there scheduled on 2/15.   -Fluid restriction and low sodium diet.

## 2022-02-13 NOTE — PLAN OF CARE
Problem: Falls - Risk of:  Goal: Will remain free from falls  Description: Will remain free from falls  2/12/2022 2306 by Trey Maldonado RN  Outcome: Ongoing  Goal: Absence of physical injury  Description: Absence of physical injury  Outcome: Ongoing     Problem: Skin Integrity:  Goal: Will show no infection signs and symptoms  Description: Will show no infection signs and symptoms  2/12/2022 2306 by Trey Maldonado RN  Outcome: Ongoing  Goal: Absence of new skin breakdown  Description: Absence of new skin breakdown  Outcome: Ongoing  Goal: Status of oral mucous membranes will improve  Description: Status of oral mucous membranes will improve  Outcome: Ongoing  Goal: Skin integrity will be maintained  Description: Skin integrity will be maintained  Outcome: Ongoing     Problem: Nutrition  Goal: Optimal nutrition therapy  Outcome: Ongoing  Goal: Understanding of nutritional guidelines  Outcome: Ongoing     Problem: Pain:  Goal: Pain level will decrease  Description: Pain level will decrease  Outcome: Ongoing  Goal: Control of acute pain  Description: Control of acute pain  2/12/2022 2306 by Trey Maldonado RN  Outcome: Ongoing  Goal: Control of chronic pain  Description: Control of chronic pain  2/12/2022 2306 by Trey Maldonado RN  Outcome: Ongoing     Problem: OXYGENATION/RESPIRATORY FUNCTION  Goal: Patient will maintain patent airway  Outcome: Ongoing  Goal: Patient will achieve/maintain normal respiratory rate/effort  Description: Respiratory rate and effort will be within normal limits for the patient  Outcome: Ongoing     Problem: HEMODYNAMIC STATUS  Goal: Patient has stable vital signs and fluid balance  Outcome: Ongoing     Problem: FLUID AND ELECTROLYTE IMBALANCE  Goal: Fluid and electrolyte balance are achieved/maintained  2/12/2022 2306 by Trey Maldonado RN  Outcome: Ongoing     Problem: ACTIVITY INTOLERANCE/IMPAIRED MOBILITY  Goal: Mobility/activity is maintained at optimum level for patient  Outcome: Ongoing     Problem:  Activity:  Goal: Fatigue will decrease  Description: Fatigue will decrease  Outcome: Ongoing  Goal: Risk for activity intolerance will decrease  Description: Risk for activity intolerance will decrease  Outcome: Ongoing     Problem: Coping:  Goal: Ability to cope will improve  Description: Ability to cope will improve  Outcome: Ongoing     Problem: Fluid Volume:  Goal: Will show no signs or symptoms of fluid imbalance  Description: Will show no signs or symptoms of fluid imbalance  Outcome: Ongoing     Problem: Health Behavior:  Goal: Ability to manage health-related needs will improve  Description: Ability to manage health-related needs will improve  Outcome: Ongoing  Goal: Identification of resources available to assist in meeting health care needs will improve  Description: Identification of resources available to assist in meeting health care needs will improve  Outcome: Ongoing     Problem: Nutritional:  Goal: Ability to identify appropriate dietary choices will improve  Description: Ability to identify appropriate dietary choices will improve  Outcome: Ongoing     Problem: Physical Regulation:  Goal: Ability to maintain clinical measurements within normal limits will improve  Description: Ability to maintain clinical measurements within normal limits will improve  Outcome: Ongoing  Goal: Complications related to the disease process, condition or treatment will be avoided or minimized  Description: Complications related to the disease process, condition or treatment will be avoided or minimized  Outcome: Ongoing     Problem: Sensory:  Goal: General experience of comfort will improve  Description: General experience of comfort will improve  Outcome: Ongoing

## 2022-02-13 NOTE — DISCHARGE SUMMARY
Hospital Medicine Discharge Summary    Patient ID: Kingsley Grider      Patient's PCP: Dolores Che MD    Admit Date: 1/30/2022     Discharge Date:  2/13/2022    Admitting Provider: Barney Madrigal MD     Discharge Provider: Barney Madrigal MD     Discharge Diagnoses: Active Hospital Problems    Diagnosis     Fluid overload [E87.70]     Acute on chronic combined systolic and diastolic CHF (congestive heart failure) ([facility]) [I50.43]     SUDHA (acute kidney injury) ([facility] [address] [N17.9]        The patient was seen and examined on day of discharge and this discharge summary is in conjunction with any daily progress note from day of discharge. Hospital Course: 49 y. o. male with paraplegia, coronary artery disease, ischemic cardiomyopathy with ejection fraction 25%, hypertension, COPD, history of PE, status post right BKA, chronic left lower extremity and sacral wounds followed by wound care center, CKD, Gitelman syndrome who presented to [facility] ED with complaint of fluid overload. #Acute on chronic systolic CHF  #Ischemic cardiomyopathy   #Anasarca  - EF 25-30% on most recent limited echo 1/10/22  - prior to admission he was taking Demadex 100 mg daily and metolazone 5 mg daily  - seen by nephro- now on Lasix gtt.  Monitor weights, UOP, and BMP  - cont toprol XL   - no ACEi/ARB or aldactone with renal issues              - lasix gtt stopped 2/8 due to BUN jumping to >100.               - nephrology discussed dialysis with pt - still grossly fluid overloaded and third spacing              - eliquis and asa held starting 2/8 for line placement. - temp vas cath placed 2/9 and UF started.     - tunneled line placed Friday pm    - AC resumed Sunday am        #Hypoxia  - suspect 2/2 fluid overload  - currently on 2L - wean as able   - diurese as above, start IS, wean O2         #CKD3a  - f/b Dr. Dash Han  - Baseline Crt 1.0  - Crt 1.4 on admit--> 1.3 --1.4-- 1.3   - nephro c/s  - monitor BMP daily         #Neurogenic bladder  - practices ISC at home, now with langley cath in place for fluid management   - UA sent on admit with WBC, urine culture e.coli   finished oral cipro       #CAD  - with history of PCI  - he denies chest pain now   - elevated troponin noted- chronic on review of past labs  - cont ASA, statin, toprol         #History of PE  - cont Eliquis 5 mg BID - see above        #COPD  - no AE  - cont inhalers         #DM2  - cont Lantus and use SSI  - adjust lantus        #Right BKA        #Chronic wounds  - right knee, LLE, sacrum.  F/b [facility].  Wound RN c/s        #Gitelman syndrome   - home magnesium continued         #Chronic anemia  - Hgb stable   - cont PO iron                 Physical Exam Performed:     /72   Pulse 83   Temp 98.7 °F (37.1 °C) (Oral)   Resp 18   Ht 6' 2\" (1.88 m)   Wt 220 lb 11.2 oz (100.1 kg)   SpO2 99%   BMI 28.34 kg/m²       Gen: No distress. Alert. Eyes: PERRL. No sclera icterus. No conjunctival injection. ENT: No discharge. Pharynx clear. Neck: Trachea midline. Normal thyroid. Resp: No accessory muscle use. No crackles. No wheezes. No rhonchi. No dullness on percussion. CV: Regular rate. Regular rhythm. No murmur or rub. 3 + edema.  Extending to the lower abdominal wall  GI: Non-tender. Non-distended. No masses. No organomegaly. Normal bowel sounds. No hernia.  Left lower quadrant ostomy bag  Skin: Warm and dry. No nodule on exposed extremities. No rash on exposed extremities. Lymph: No cervical LAD. No supraclavicular LAD. M/S: Right BKA  Neuro: Paraplegia   Psych: Oriented x 3. No anxiety or agitation.         Labs:  For convenience and continuity at follow-up the following most recent labs are provided:      CBC:    Lab Results   Component Value Date    WBC 8.8 02/13/2022    HGB 10.2 02/13/2022    HCT 32.1 02/13/2022    PLT 94 02/13/2022       Renal:    Lab Results   Component Value Date     02/13/2022    K 4.9 02/13/2022    K 4.3 01/30/2022    CL 92 02/13/2022    CO2 25 02/13/2022    BUN 67 02/13/2022    CREATININE 2.0 02/13/2022    CALCIUM 8.9 02/13/2022    PHOS 4.2 02/13/2022         Significant Diagnostic Studies    Radiology:   IR TUNNELED CVC PLACE WO SQ PORT/PUMP > 5 YEARS   Preliminary Result   Status post removal of temporary dialysis catheter followed by   fluoroscopically guided placement of right internal jugular tunneled dialysis   catheter as described above. IR NONTUNNELED VASCULAR CATHETER > 5 YEARS   Final Result   Successful ultrasound guided non-tunneled HD catheter placement. Catheter is   okay for use.          XR CHEST PORTABLE   Final Result   Increasing right basilar pleural and parenchymal disease consistent with   atelectasis or pneumonia and a small pleural effusion      Improved left basilar subsegmental atelectasis                Consults:     IP CONSULT TO NEPHROLOGY  IP CONSULT TO HOSPITALIST  IP CONSULT TO NEPHROLOGY    Disposition:  home     Condition at Discharge: Stable    Discharge Instructions/Follow-up:  HD for UF first session Tuesday Feb 15th    Code Status:  Full Code     Activity: activity as tolerated    Diet: renal diet      Discharge Medications:     Current Discharge Medication List           Details   metoprolol succinate (TOPROL XL) 25 MG extended release tablet Take 1 tablet by mouth daily  Qty: 30 tablet, Refills: 2              Details   metOLazone (ZAROXOLYN) 5 MG tablet Take 1 tablet by mouth daily  Qty: 30 tablet, Refills: 3    Associated Diagnoses: Generalized edema      MAGNESIUM-OXIDE 400 (241.3 Mg) MG TABS tablet TAKE THREE TABLETS BY MOUTH TWICE A DAY  Qty: 270 tablet, Refills: 0      torsemide (DEMADEX) 100 MG tablet Take 1 tablet by mouth daily  Qty: 30 tablet, Refills: 1      Respiratory Therapy Supplies (NEBULIZER/TUBING/MOUTHPIECE) KIT 1 kit by Does not apply route daily as needed (SOB)  Qty: 1 kit, Refills: 11    Associated Diagnoses: Chronic obstructive pulmonary disease, unspecified COPD type ([facility])      triamcinolone (KENALOG) 0.1 % cream Apply 2 times daily to the rash on your arms. Qty: 45 g, Refills: 1      nitroGLYCERIN (NITROSTAT) 0.4 MG SL tablet DISSOLVE 1 TAB UNDER TONGUE FOR CHEST PAIN - IF PAIN REMAINS AFTER 5 MIN, CALL 911 AND REPEAT DOSE.  MAX 3 TABS IN 15 MINUTES  Qty: 25 tablet, Refills: 3      apixaban (ELIQUIS) 5 MG TABS tablet Take 1 tablet by mouth 2 times daily TAKE ONE TABLET BY MOUTH TWICE A DAY  Qty: 60 tablet, Refills: 11      Fluticasone furoate-vilanterol (BREO ELLIPTA) 200-25 MCG/INH AEPB inhaler Inhale 1 puff into the lungs daily  Qty: 60 each, Refills: 5    Associated Diagnoses: Chronic cough; Pulmonary atelectasis      albuterol sulfate HFA (VENTOLIN HFA) 108 (90 Base) MCG/ACT inhaler Inhale 2 puffs into the lungs 4 times daily as needed for Wheezing  Qty: 18 g, Refills: 5    Associated Diagnoses: Chronic cough; Pulmonary atelectasis      albuterol (PROVENTIL) (5 MG/ML) 0.5% nebulizer solution Take 0.5 mLs by nebulization 4 times daily as needed for Wheezing  Qty: 360 each, Refills: 3    Associated Diagnoses: Chronic cough; Pulmonary atelectasis      pantoprazole (PROTONIX) 40 MG tablet Take 1 tablet by mouth daily  Qty: 90 tablet, Refills: 1    Associated Diagnoses: Gastroesophageal reflux disease without esophagitis      traZODone (DESYREL) 50 MG tablet TAKE ONE TABLET BY MOUTH ONCE NIGHTLY  Qty: 90 tablet, Refills: 1      montelukast (SINGULAIR) 10 MG tablet TAKE ONE TABLET BY MOUTH DAILY  Qty: 30 tablet, Refills: 5    Associated Diagnoses: Chronic cough; Pulmonary atelectasis      insulin glargine (LANTUS) 100 UNIT/ML injection vial Inject 60 Units into the skin 2 times daily  Qty: 5 pen, Refills: 1    Associated Diagnoses: Type 2 diabetes mellitus with other skin ulcer, with long-term current use of insulin ([facility])      insulin lispro (HUMALOG) 100 UNIT/ML injection vial Inject 20 Units into the skin 3 times daily (before meals) INJECT 20 UNITS UNDER THE SKIN THREE TIMES A DAY BEFORE MEALS + SLIDING SCALE  Qty: 10 mL, Refills: 0    Comments: DOSE INCREASE      Menthol-Methyl Salicylate (THERA-GESIC) 0.5-15 % CREA Apply topically as needed       Respiratory Therapy Supplies (ONE FLOW SPIROMETER) TRAE To be used PRN. Qty: 1 Device, Refills: 0    Comments: Fax to [address] Diagnoses: Chronic cough; Pulmonary atelectasis      Insulin Syringe-Needle U-100 ([employer] INSULIN SYRINGE) 31G X 5/16\" 1 ML MISC 1 each by Does not apply route daily  Qty: 100 each, Refills: 11    Associated Diagnoses: Type 2 diabetes mellitus with diabetic peripheral angiopathy without gangrene, with long-term current use of insulin ([facility])      polyethylene glycol (MIRALAX) 17 GM/SCOOP powder Take 1 scoop daily. Qty: 510 g, Refills: 0      senna (SENNA-LAX) 8.6 MG tablet TAKE TWO TABLETS BY MOUTH DAILY  Qty: 180 tablet, Refills: 0    Associated Diagnoses: Muscle spasm      docusate sodium (STOOL SOFTENER) 100 MG capsule TAKE TWO CAPSULES BY MOUTH DAILY  Qty: 180 capsule, Refills: 0      Alirocumab (PRALUENT) 150 MG/ML SOAJ Inject 150 mg into the skin every 30 days       ferrous sulfate (IRON 325) 325 (65 Fe) MG tablet TAKE ONE TABLET BY MOUTH DAILY WITH BREAKFAST  Qty: 90 tablet, Refills: 1    Associated Diagnoses: Anemia of chronic disorder      Insulin Syringe-Needle U-100 ([employer] INSULIN SYRINGE) 31G X 5/16\" 0.5 ML MISC Use 4 times daily. DX: E11.9  Qty: 100 each, Refills: 11      Insulin Pen Needle ([employer] PEN NEEDLES 31G) 31G X 8 MM MISC Use with Humalog. DX:E11.9  Qty: 100 each, Refills: 3      cetirizine (ZYRTEC) 10 MG tablet TAKE ONE TABLET BY MOUTH DAILY  Qty: 30 tablet, Refills: 2      nystatin (MYCOSTATIN) 002570 UNIT/GM powder Mix with your zinc oxide paste, apply to groin rash 3 times daily as needed.   Qty: 30 g, Refills: 2      aspirin 81 MG tablet Take 81 mg by mouth nightly       cyclobenzaprine (FLEXERIL) 10 MG tablet Take 1 tablet by mouth 2 times daily as needed for Muscle spasms  Qty: 180 tablet, Refills: 1    Associated Diagnoses: Muscle spasm             Time Spent on discharge is more than 30 minutes in the examination, evaluation, counseling and review of medications and discharge plan. Signed:    Coral Mckee MD   2/13/2022      Thank you Angelica Medina MD for the opportunity to be involved in this patient's care. If you have any questions or concerns please feel free to contact me at [phone].
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
## 2022-02-14 NOTE — CARE COORDINATION
hospital d/c and pt needs a [employer] PA through insurance and needs marked urgent\". Mariam Becerra states that CM will need to call Claude Silvas again to see if the plan has changed,. CM called Claude Silvas again ([phone]) and spoke with Diony Chapa with the Provider Line in Member Services and CM explained what was going on and stated that CM must speak with someone else and she transferred CM. Nava transferred CM to [address] [place] [address] in Member Services who states she is \"trying to see what she can due to get this moving along as fast as possible\". Juana states that she cannot see a reason why transport cannot be set up and she is still working on this. She placed CM back on hold to look further into this. Juana came back on the line to inquire if needs to go via stretcher. Juana states that she is sending to "[employer] [place], and will have them reach out to me. Juana took CM asked for Tamika's number        Pt is currently on 3L of O2 at 95%. Pt is not on home O2. CM asked about DME companies and pt did not have a preference. Pt was agreeable to [facility]. CM called Rubi Sales with [employer] and he will let CM know when he is completed. [facility] RN is aware and will do the walk test for pt for O2. Per walk test from [facility] \"Patient 88% at rest on RA. Patient  94%  on 2 L at rest.     Patient n/a  on RA ambulating --- pt unable to ambulate due to being paraplegic     Patient n/a  at n/a  L ambulating. --- pt unable to ambulate due to being paraplegic. \"    Aby Nick with [facility] and informed her that pt is only going on Tues and Thurs at 11am.   Again, CM confirmed with Dr. Burtis Galeazzi that he was alright with the days being on Tuesday and Thursday, and confirmed this as this would be better transportation days for EMS  and he confirmed that these days would be alright. CM also reconfirmed with pt that he was able to sit in a chair for HD and he confirmed that he could verses needing a stretcher/bed for HD.  He states that he will not have a problem with sitting in a chair for HD. Addendum [phone]: Cruzito called the CM supervisor, Yovani Felicia, ([phone]) regarding transportation. CM received her VM and left a VM for pt and explained the urgency to have this transport AND the HD set up, as we have tried to do this all weekend. Awaiting call back from Yovani Duarte. Addendum : left another VM for Yovani Duarte with [address] [place] [address] ([phone]). CRUZITO has been on the phone with Stephanie for over 2 hours total this AM for  Just transportation. THis has been a barrier for pt to get home. CRUZITO spoke with Dr. Warren White (Counts include 234 beds at the [facility]) and he would like pt to go to HD now only 2 days a week (Tues and Thurs). CM has left this on the VM for [employer] for Yovani Duarte. Addendum 2/14/2022 1608: CRUZITO called [employer] number again for transport ([phone]) and spoke with Malissabrando Castellanos. CRUZITO explained the situation to Malissa Castellanos and that I needed to have pt to be discharged to home today and then to set up for HD for Tues or Thurs, starting  2/15/2022 at [address] states that she can try to set up pt for d/c to home first.  Malissa Castellanos states that he has taken 9 trips and that he is over his trip limit for 1/1-1/31/22 and the plan only allows 60 miles a month. Per Malissa Castellanos she states he has only gone through 10 miles. CRUZITO explained that he has been in the hospital for 15 days, so nothing has been used in Feb, 2022. Cruzito stated that it should have reset for the month of February. Malissa Castellanos states that he is not able to save this d/t being over his trip limit and [employer] has to approve further trips, despite it should have been reset. CM informed pt of the above, as well as Amber FLOWERS. Until CM can get transport set for HD, then cannot d/c pt. Pt's O2 sats are 94% on 2L. No further discharge needs needed or noted.     Addendum 2/14/2022 1334: CM called Yovani Duarte, [address] [facility] with [facility] and left another  for her to call CM. Addendum 2/14/2022 1405: Chelsey Villa called CM back from [facility]. She states she is working on this and hopes to have info soon. Now 3 1/2 hours spent for trying to set up transportation without success. Addendum 1500: Cm spoke with Chelsey Villa with [facility], as she called CM back. She states that they state that [facility] [employer] are working on it and she hopes to have an answer for my plus the  time for tonight and for HD in the next 15 minutes\". Addendum [phone]: CM called Chelsey Villa with [facility] and she states that she was informed that they are still working on it. Jaqui Patel states that she will contact CM by 1700 today or sooner when she is informed of the the  time. Latha Story with [address] [place] [address] is aware that we are awaiting transport. He states pt can have one POC and one e-tank delivered to his room. When CM went to [employer], only e-tanks available. Latha Story to bring a POC tomorrow at 0900. Pt is to call [facility] as he is leaving via squad to home so that they can meet him at the house. Pt verbalized understanding. Addendum 2/14/2022 1636:   Per Jaqui Patel with [facility] and she is still trying to get pt out of here to home tonight. Pt is still needing home O2 set up. Addendum 2/14/2022 1700 Jaqui Patel called and stated it was confirmed that Kay will pick pt up at 10am tomorrow. ([facility] was full and could not transport pt). Jaqui Patel states that this is a standing order for  on Tues and Thurs for [address] [facility] [address] for 11am chair time, except for tomorrow being a  form th  CM informed Dr. Goodson Gravely of this with verbalized understanding and approval for pt to /c tomorrow at 10am and then go to [facility] at [address] chair time and states he has already given Kaela with [facility] orders for pt.      CM informed Lyla Rodriguez with [facility] that pt will be at the [facility] time slot of 11am tomorrow (2/15). Charu Camejo states that she will let [facility] know now. CM spoke with Sunrise (charge RN) with [employer] HD here at the hospital to let her know that pt will d/c from hospital at [place] [address] tomorrow (2/15) and will be going to [facility] for 11am chair time. Kristina verbalized understanding     Rheta Factor to bring a POC tomorrow (2/15) at 0900.    2/14/2022 1730: Lisa Ingram with [facility] called CM and gave trip numbers for both transports and CM placed these on the d/c instructions for pt to reference if needed. [facility]  pt at 10am on 2/15/22 and then transport pt to [facility] for the 11am chair time and then home. Trip #: [id number]. Then for 2/17/2022, Per Lisa Ingram with [facility]   [facility]  pt at 10am and transport pt to [facility] for HD at [address] chair time and then transport pt back home after HD is over. Trip #: [id number]. Per Lisa Ingram, they will have this as a reoccurring transport for patient, but may change to [facility] if available, but right now it is with [address] [facility]. CM also placed [facility] [employer]'s phone number in the d/c instructions should pt need to reference it. Lisa Ingram with [facility] also states that pt has unlimited transport through [employer]. CM called Kaur Owusu with [facility]. and left a VM for him to let him know that pt will d.c back to home tomorrow at 10am after he has HD at 11am. CM informed [facility] that pt is a new start to [facility] for home O2 at 2L and a new start to outpt HD at [facility] for [address] CM informed [facility] with [facility] that pt will d/c on 2/15/2022 and that [facility], [facility]. orders for SN/HHA are in and the KISHAN/AVS has been completed. Kaur Owusu will pull this info from Epic and Cm does not need to fax it. Reviewed chart. Role of discharge planner explained and patient verbalized understanding. Discharge order is noted.     Has Home O2 in place on admit:  New home O2  Informed of need to bring portable home O2 tank on day of discharge for nursing to connect prior to leaving:   New home O2  Verbalized agreement/Understanding:   New home O2  Pt is being d/c'd to home today. Discharge timeout done with [facility]. All discharge needs and concerns addressed. Pt is not being discharged today as Ronnie Segal set up transport too late (after 1900pm) and there was concern of pt getting his home O2 delivered properly, therefore pt will discharged at 10am on 2/15/2022 for a safer discharge. Dr. Jason Krueger, Dr. Nik Servin RN, and Ida RN (charge RN) are aware.

## 2022-02-14 NOTE — PROGRESS NOTES
Patient 88% at rest on RA. Patient  94%  on 2 L at rest.    Patient n/a  on RA ambulating --- pt unable to ambulate due to being paraplegic     Patient n/a  at n/a  L ambulating. --- pt unable to ambulate due to being paraplegic.

## 2022-02-14 NOTE — PROGRESS NOTES
Nephrology Progress Note   [facility]. [facility]      This patient is a 49year old male whom we are following for SUDHA on CKD. Subjective: The patient was seen and examined; he feels well today with no CP, SOB, nausea or vomiting. ROS: No fever or chills. Social: No family at bedside. Vitals:  /67   Pulse 81   Temp 96.8 °F (36 °C) (Axillary)   Resp 20   Ht 6' 2\" (1.88 m)   Wt 219 lb 9.3 oz (99.6 kg)   SpO2 96%   BMI 28.19 kg/m²   I/O last 3 completed shifts: In: 910 [P.O.:910]  Out: 1225 [Urine:1025; Stool:200]  I/O this shift:  In: 240 [P.O.:240]  Out: -     Physical Exam:  Physical Exam  Vitals reviewed. Constitutional:       General: He is not in acute distress. HENT:      Head: Normocephalic and atraumatic. Eyes:      General: No scleral icterus. Conjunctiva/sclera: Conjunctivae normal.   Cardiovascular:      Rate and Rhythm: Normal rate. Pulmonary:      Effort: Pulmonary effort is normal. No respiratory distress. Abdominal:      Comments: (+) abdominal wall edema, non-tender   Musculoskeletal:      Right lower leg: Edema present. Left lower leg: Edema present.        Access: R IJ TDC      Medications:   metoprolol succinate  25 mg Oral Dinner    torsemide  100 mg Oral Daily    insulin glargine  33 Units SubCUTAneous BID    insulin lispro  7 Units SubCUTAneous TID WC    ciprofloxacin  500 mg Oral 2 times per day    menthol-zinc oxide   Topical BID    apixaban  5 mg Oral BID    aspirin  81 mg Oral Nightly    cetirizine  10 mg Oral Daily    docusate sodium  100 mg Oral BID    ferrous sulfate  325 mg Oral Daily with breakfast    budesonide-formoterol  2 puff Inhalation BID    montelukast  10 mg Oral Daily    pantoprazole  40 mg Oral QAM AC    polyethylene glycol  17 g Oral Daily    senna  2 tablet Oral Nightly    insulin lispro  0-12 Units SubCUTAneous TID WC    insulin lispro  0-6 Units SubCUTAneous Nightly    sodium chloride flush  5-40 mL IntraVENous 2 times per day     Labs:  Recent Labs     02/12/22  0510 02/13/22  0500 02/14/22  0630   WBC 7.7 8.8 5.6   HGB 10.7* 10.2* 10.3*   HCT 33.2* 32.1* 32.2*   MCV 76.1* 76.8* 77.0*   * 94* 123*     Recent Labs     02/12/22  0510 02/13/22  0500 02/14/22  0630   * 129* 129*   K 4.8 4.9 4.9   CL 88* 92* 91*   CO2 27 25 25   GLUCOSE 145* 154* 121*   PHOS 4.5 4.2 4.5   BUN 79* 67* 81*   CREATININE 1.8* 2.0* 1.8*   LABGLOM 39* 35* 39*   GFRAA 48* 42* 48*          Assessment/      Acute Kidney Injury:    - Etiology:  Decompensated heart failure with tense abdominal wall edema and increased intra-abdominal pressure causing decreased renal blood flow and responsiveness to diuretics             HD started from 2/9 d/t ongoing volume overload and worsening azotemia                Chronic Kidney Disease:  Stage 3  - Multiple episodes of SUDHA, previously needing HD as outpatient  - Last Scr was 1 but BUN 61  - Follows with Dr. Hanane Garcias in office     Hyponatremia- Hypervolemic      -CHF with reduced EF              Decompensated but respiratory status is ok     -Chronic dependent lymphedema in the setting of paraplegia     -Neurogenic bladder, history of self-catheterization every 4 hours              CVX with lnagley     -Hypokalemia - prn replacement     Plan/     -HD again on Tuesday with UF as ordered  - iv lasix 80 mg times one  -Continue Torsemide 100 mg daily  -OP HDU arrangement, has been accepted at [facility]. On twice a week HD mainly for volume control (Tuesday-Saturday). First treatment there scheduled on 2/15.   -Fluid restriction and low sodium diet.
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
## 2022-02-14 NOTE — PLAN OF CARE
Problem: Falls - Risk of:  Goal: Will remain free from falls  Description: Will remain free from falls  Outcome: Ongoing     Problem: Nutrition  Goal: Optimal nutrition therapy  Outcome: Ongoing     Problem: Pain:  Goal: Pain level will decrease  Description: Pain level will decrease  Outcome: Ongoing

## 2022-02-14 NOTE — PROGRESS NOTES
Shift assessment complete, see flowsheets. Medications given, see MAR. Pt A+Ox4. Colostomy emptied. Port flushed, patent. HD access site CDI. Denies needs or discomforts at this time. Call light in easy reach, bedside table in easy reach, and bed in lowest position.   Winnie Gonzalez RN

## 2022-02-15 NOTE — CARE COORDINATION
DISCHARGE ORDER  Date/Time 2/15/2022 9:16 AM  Completed by: Shayna Talbot RN, Case Management    Patient Name: Richi Riley      : 1965  Admitting Diagnosis: SUDHA (acute kidney injury) ([facility] [N17.9]  Fluid overload [E87.70]  Acute on chronic combined systolic and diastolic CHF (congestive heart failure) ([facility] [I50.43]  Anasarca associated with disorder of kidney [N04.9]  Decreased urine output [R34]      Admit order Date and Status:22  (verify MD's last order for status of admission)      Noted discharge order. If applicable PT/OT recommendation at Discharge: n/a  DME recommendation by PT/OT:n/a  Confirmed discharge plan  (pt): Yes  with whom____________pt___  If pt confirmed DC plan does family need to be contacted by CM No if yes who______n/a  Discharge Plan: Reviewed chart. Role of discharge planner explained and patient verbalized understanding. Pt is alert and oriented. Discharge order is noted. Pt is being d/c'd to home today. Pt is being picked up via [address] at [place] [id number] (through [facility] # [id number]) and will be taken to [facility] for HD at [address] [facility] [address] chair time for today, as pt has HD on Tue and Thurs, as Dr. Chari Rosenthal approved. Pt's O2 sats are 96% on 2L. [facility] will  [facility] [employer] has brought a POC and the etank in pt's room. [facility] with [facility] [employer] is explaining to pt how to use the POC and when to call [employer]. Pt has Richard's number on the d/c instructions,as well as the tank and POC. Pt will take the POC and tank with him to the HD and then to home. Aercindie   Pt will [facility] with [place] [facility], +HC order and +eCOC. Cassandra Conte with [facility]. is aware and will obtain the info from Epic. CRUZITO spoke with Dr. Chari Rosenthal and he is aware pt is d./cing to day at 10am and going to HD at [facility] and approves HD on Tues and Thurs. Pt continues to states he can handle HD in a chair and does not need HD in a bed. No further discharge needs needed or noted. Reviewed chart.   Role of discharge planner explained and patient verbalized understanding. Discharge order is noted. Has Home O2 in place on admit:  New start of care for home O2. Informed of need to bring portable home O2 tank on day of discharge for nursing to connect prior to leaving:    New start of care for home O2. Verbalized agreement/Understanding:   Yes  Pt is being d/c'd to HD and then home today via [address] at 10am. Pt's O2 sats are 96% on 2L. Discharge timeout done with Karina Barreto RN. All discharge needs and concerns addressed.

## 2022-02-15 NOTE — PROGRESS NOTES
Patient educated on discharge instructions as well as new medications use, dosage, administration and possible side effects. Patient verified knowledge. IV removed without difficulty and dry dressing in place. Telemetry monitor removed and returned to [facility]. Pt left facility in stable condition to Home with all of their personal belongings.
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
## 2022-02-15 NOTE — PROGRESS NOTES
Shift assessment complete, scheduled meds given. Call light and bedside table in reach.  Will continue to monitor

## 2022-02-15 NOTE — FLOWSHEET NOTE
02/15/22 0837   Vital Signs   Temp 97.2 °F (36.2 °C)   Temp Source Oral   Pulse 81   Heart Rate Source Monitor   Resp 18   /70   BP Location Left upper arm   Patient Position Semi fowlers   Level of Consciousness Alert (0)   MEWS Score 1   Patient Currently in Pain Denies   Oxygen Therapy   SpO2 96 %   Pulse Oximeter Device Mode Continuous   Pulse Oximeter Device Location Right;Finger   O2 Device Nasal cannula   Skin Assessment Clean, dry, & intact   O2 Flow Rate (L/min) 2 L/min     AM assessment complete. Pt a&o x4. C/o chronic pain. Delgado and port in place. Both to be removed prior to d/c. Generalized edema noted. Continues on 2L via nc, denies any sob or cough. Call light and bedside table within reach. Will continue to monitor.

## 2022-02-15 NOTE — PROGRESS NOTES
Nephrology Progress Note   [facility]      This patient is a 49year old male whom we are following for SUDHA on CKD. Subjective:  He feels well  Still with edema    ROS: No fever or chills. Social: No family at bedside. Vitals:  /70   Pulse 81   Temp 97.2 °F (36.2 °C) (Oral)   Resp 18   Ht 6' 2\" (1.88 m)   Wt 229 lb 11.5 oz (104.2 kg)   SpO2 96%   BMI 29.49 kg/m²   I/O last 3 completed shifts: In: 240 [P.O.:240]  Out: 1950 [Urine:1950]  No intake/output data recorded. Physical Exam:  Physical Exam  Vitals reviewed. Constitutional:       General: He is not in acute distress. HENT:      Head: Normocephalic and atraumatic. Eyes:      General: No scleral icterus. Conjunctiva/sclera: Conjunctivae normal.   Cardiovascular:      Rate and Rhythm: Normal rate. Pulmonary:      Effort: Pulmonary effort is normal. No respiratory distress. Abdominal:      Comments: (+) abdominal wall edema, non-tender   Musculoskeletal:      Right lower leg: Edema present. Left lower leg: Edema present.        Access: R IJ TDC      Medications:   metoprolol succinate  25 mg Oral Dinner    torsemide  100 mg Oral Daily    insulin glargine  33 Units SubCUTAneous BID    insulin lispro  7 Units SubCUTAneous TID WC    ciprofloxacin  500 mg Oral 2 times per day    menthol-zinc oxide   Topical BID    apixaban  5 mg Oral BID    aspirin  81 mg Oral Nightly    cetirizine  10 mg Oral Daily    docusate sodium  100 mg Oral BID    ferrous sulfate  325 mg Oral Daily with breakfast    budesonide-formoterol  2 puff Inhalation BID    montelukast  10 mg Oral Daily    pantoprazole  40 mg Oral QAM AC    polyethylene glycol  17 g Oral Daily    senna  2 tablet Oral Nightly    insulin lispro  0-12 Units SubCUTAneous TID WC    insulin lispro  0-6 Units SubCUTAneous Nightly    sodium chloride flush  5-40 mL IntraVENous 2 times per day     Labs:  Recent Labs     02/13/22  0500 02/14/22  0630   WBC 8.8 5.6 HGB 10.2* 10.3*   HCT 32.1* 32.2*   MCV 76.8* 77.0*   PLT 94* 123*     Recent Labs     02/13/22  0500 02/14/22  0630 02/15/22  0502   * 129* 126*   K 4.9 4.9 3.9   CL 92* 91* 88*   CO2 25 25 24   GLUCOSE 154* 121* 150*   PHOS 4.2 4.5 4.1   BUN 67* 81* 88*   CREATININE 2.0* 1.8* 1.8*   LABGLOM 35* 39* 39*   GFRAA 42* 48* 48*          Assessment/      Acute Kidney Injury:    - Etiology:  Decompensated heart failure with tense abdominal wall edema and increased intra-abdominal pressure causing decreased renal blood flow and responsiveness to diuretics             HD started from 2/9 d/t ongoing volume overload and worsening azotemia                Chronic Kidney Disease:  Stage 3  - Multiple episodes of SUDHA, previously needing HD as outpatient  - Last Scr was 1 but BUN 61  - Follows with Dr. Gayla Medrano in office     Hyponatremia- Hypervolemic      -CHF with reduced EF              Decompensated but respiratory status is ok     -Chronic dependent lymphedema in the setting of paraplegia     -Neurogenic bladder, history of self-catheterization every 4 hours              IXH with langley     -Hypokalemia - prn replacement     Plan/     -Continue Torsemide 100 mg daily  -OP HDU arrangement, has been accepted at [facility]. On twice a week HD mainly for volume control (Tuesday-Thursday).  First treatment there scheduled on 2/15   -Fluid restriction and low sodium diet.  -Lanlgey catheter can be discontinued as OP when pt's swelling gets down and can start straight cath
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
## 2022-02-18 NOTE — PLAN OF CARE
Patient to continue with same wound care regime. No new concerns noted in wounds this visit. Home care remains in place for intermittent wound care. Patient Discharge instructions reviewed with patient, all questions answered, copy given to patient. Dressings were applied to all wounds per M.D. Instructions at this visit.

## 2022-02-18 NOTE — CARE COORDINATION
Received update from Radha BOOTHE at [address]. She has Mr Nathalie Lowe scheduled for his first three trips to HD starting tomorrow and will continue to work on a standing order. She also notified patient of the update. CTN will follow up next week. Per chart review he completed [facility] visit today and has scheduled vv with PCP on Monday.      Anthony Mcginnis RN  Care Transition Nurse  [phone] mobile    Future Appointments   Date Time Provider Alyson Cardona   2/21/2022  3:10 PM MD Ben Be[facility] None   2/24/2022  1:15 PM Edson Brownlee MD [facility] CLER CAR [facility]   [facility]2022 [facility]30 [facility]   3/4/2022 10:15 AM MD Dulce Perez [facility]   3/18/2022 10:15 AM MD Joelle Perez [facility]   4/1/2022 10:15 AM MD Dulce Perez [facility]

## 2022-02-18 NOTE — PROGRESS NOTES
[facility] Physician Orders and Discharge [address], [address] [facility], [facility]  Telephone: [phone]      Fax: [phone]        Your home care [facility] wound-care supplies will be provided by: [facility] care     NAME: Dayton Samuels Jr   DATE of BIRTH:  1965  PRIMARY DIAGNOSIS FOR WOUND CARE CENTER:  Pressure Ulcer      Wound cleansing:   Do not scrub or use excessive force. Wash hands with soap and water before and after dressing changes. Prior to applying a clean dressing, cleanse wound with normal saline, wound cleanser, or mild soap and water.  Ask your physician or nurse before getting the wound(s) wet in the shower.                Wound care for home:        Interdry to skin folds As needed       Left Distal Leg:  Vashe,Collagen with or without silver, Mepilex border   Leave in place for a week then change    Left Proximal Leg:  Triad to wound (if drainage is minimal then add some antibiotic ointment to triad)  Cover with a dry dressing  Change 3 x week     Make sure you are wearing your offloading boots when in bed      Right Knee:  Betadine  Xeroform   Plain foam  cast padding x 2  Kerlix, then a big piece of stockinet to keep dressing in place  Leave in place for a week then change     Chronic Open Back Wound:   Dr. José Miguel Silver used Silver Nitrate on your wound today.  The wound will be black or silver in appearance  for the next several days, this is normal.   Today in Wound Clinic only also apply Triamcinolone to the 2 nitrated areas  Spray Hypochlorous Acid into wound let sit, do not rinse  Calmoseptine to alin wound  Silver Alginate for drainage as needed make sure to use 1 big piece over back wounds not individual pieces (we are applying today) and if home care uses drawtex make sure it is 1 piece as well  Cover with a Mepilex Border making sure to cover the skin tear   Change 3 x week     Scrotum/Buttocks/Posterior Thigh:   Calmoseptine to protect skin     Please note, all wounds (unless stated otherwise here) were mechanically debrided at the time of cleansing here in the wound-care center today, so a small amount of pain, drainage or bleeding from that process might be expected, and is normal.      All products for home use, including multiple products for a single wound if applicable, are medically necessary in order to achieve the best chance at timely wound healing. See provider documentation for details if needed.     Substituted dressings applied in the [facility] today, if applicable:           New orders for this week (labs, imaging, medications, etc.):              Additional instructions for specific diagnoses:     Continue to use the HIBICLENS (or the generic version) after your bath on the areas that are more troublesome such as your knee, chest and even around the back wound, make sure you let it dry, do not rinse        F/U Appointment is with Dr. Daniela Maldonado in 2 weeks   on                                   at                       .     Your nurse  is Heidi FLOWERS.      If we applied slip-resistant hospital socks today, be sure to remove them at least once a day to inspect your toes or feet, even if you're not changing the wraps or dressings underneath.  If you see anything concerning (redness, excess moisture, etc), please call and let us know right away.     Should you experience any significant changes in your wound(s) (including redness, increased warmth, increased pain, increased drainage, odor, or fever) or have questions about your wound care, please contact the [employer] at [phone] Monday-Thursday from 8:00 am - 4:30 pm, or Friday from 8:00 am - 2:30 pm.  If you need help with your wound outside these hours and cannot wait until we are again available, contact your home-care company (if applicable), your PCP, or go to the nearest emergency room

## 2022-02-20 PROBLEM — E87.70 FLUID OVERLOAD: Status: RESOLVED | Noted: 2022-01-01 | Resolved: 2022-01-01

## 2022-02-20 NOTE — PROGRESS NOTES
[facility] Progress Note    Jovanna Quiroz     : 1965    DATE OF VISIT:  2022    Subjective:     Jovanna Quiroz is a 49 y.o. male who has a dehisced surgical ulcer located on the back (midline). Significant symptoms or pertinent wound history since last visit: he's not been here for a few weeks due to a recent hospital admission for CHF / CKD, massive fluid overload. Now has a tunneled HD catheter, getting HD BIW currently, also has a Delgado catheter. Wound generally did ok during that time, though there's one new pressure ulcer on the left lower leg (proximal to where the original one had been). Additional ulcer(s) noted? yes. Two LLE pressure ulcers, and some small patches of friction-related denudation at the right ischium. Right knee looks healed, but very fragile. His current medication list consists of Alirocumab, Fluticasone furoate-vilanterol, Insulin Pen Needle, Insulin Syringe-Needle U-100, Nebulizer/Tubing/Mouthpiece, One Flow Spirometer, Thera-Gesic, albuterol, albuterol sulfate HFA, apixaban, aspirin, cetirizine, cyclobenzaprine, docusate sodium, ferrous sulfate, insulin glargine, insulin lispro, magnesium oxide, metOLazone, metoprolol succinate, montelukast, nitroGLYCERIN, nystatin, pantoprazole, polyethylene glycol, senna, torsemide, traZODone, and triamcinolone.     Allergies: Benadryl [diphenhydramine hcl], Keflex [cephalexin], Statins, Cephalexin, Morphine, Penicillins, and Sulfa antibiotics    Objective:     Vitals:    22 1036 22 1127   BP:  (!) 147/76   Pulse: 90    Resp: 18    Temp: 97.5 °F (36.4 °C)    TempSrc: Oral    SpO2: 99%    Weight: 205 lb (93 kg)    Height: 6' 2\" (1.88 m)      Constitutional:  well-developed, well-nourished, NAD, conversant, not as fatigued  Psychiatric:  oriented to person, place and time; mood and affect appropriate for the situation   Cardiovascular:  heart regular, no gallop, no murmur; moderate lower extremity lymphedema; left foot a bit cool, slow cap refill, Dopplerable pulses; less abdominal wall edema than recently, with a bit of stasis erythema at the left flank; tunneled HD catheter site benign. Musculoskeletal:  no clubbing, cyanosis or petechiae; extremities with no gross effusions, joint misalignment or acute arthritis     Periwound skin: usual pink to red and slightly moist at back; indurated, pink to slightly red and friction changes at ischium; pink and indurated at left leg; also some hyperkeratosis at right knee.     Wounds: back with 5 exposed screws, two wounds with more hypergranulation tissue, bleeding, inflammation and maybe a bit of cloudy drainage; ischium just a few little patches of partial-thickness pressure and friction ulceration; right knee very delicately healed; left distal leg pressure ulcer small, red, hypergranular, clean; left proximal pressure ulcer with some eschar, bleeding, no signs of infection.     Today's Wound Measurements, per RN documentation:  [ELOAJRT] Wound 06/25/21 #72, Right Knee Cluster, Pressure Injury, Stage 2, Onset 6/22/21-Wound Length (cm): 0 cm  Wound 11/05/21 #82, Left Posterior Lower Leg Proximal, pressure Unstageable,, onset 11/02/2021-Wound Length (cm): 1 cm  Wound 02/18/22 #80, Left Posterior Lower Leg Distal, pressure, stage 2, onset 2/15/22-Wound Length (cm): 2 cm  Wound 11/05/21 # 81, mid-back, Surgical, full thickness, onset June 2015-Wound Length (cm): 9.5 cm    [REMOVED] Wound 06/25/21 #72, Right Knee Cluster, Pressure Injury, Stage 2, Onset 6/22/21-Wound Width (cm): 0 cm  Wound 11/05/21 #82, Left Posterior Lower Leg Proximal, pressure Unstageable,, onset 11/02/2021-Wound Width (cm): 0.5 cm  Wound 02/18/22 #80, Left Posterior Lower Leg Distal, pressure, stage 2, onset 2/15/22-Wound Width (cm): 1 cm  Wound 11/05/21 # 81, mid-back, Surgical, full thickness, onset June 2015-Wound Width (cm): 5 cm    [REMOVED] Wound 06/25/21 #72, Right Knee Cluster, Pressure Injury, Stage 2, Onset 6/22/21-Wound Depth (cm): 0 cm  Wound 11/05/21 #82, Left Posterior Lower Leg Proximal, pressure Unstageable,, onset 11/02/2021-Wound Depth (cm): 0.1 cm  Wound 02/18/22 #80, Left Posterior Lower Leg Distal, pressure, stage 2, onset 2/15/22-Wound Depth (cm): 0.1 cm  Wound 11/05/21 # 81, mid-back, Surgical, full thickness, onset June 2015-Wound Depth (cm): 0.6 cm    Assessment:     Patient Active Problem List   Diagnosis Code    Mixed hyperlipidemia E78.2    Coronary artery disease involving native coronary artery of native heart without angina pectoris I25.10    Paraplegia, complete ([facility]) G82.21    Colostomy in place ([facility] [address] Z93.3    Chronic back pain M54.9, G89.29    Arthritis M19.90    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    LIBORIO on CPAP G47.33, Z99.89    Moderate persistent asthma without complication D51.31    Choledocholithiasis K80.50    Hyponatremia E87.1    Ulcer of right knee, limited to breakdown of skin ([facility]) L97.811    Acute on chronic combined systolic and diastolic CHF (congestive heart failure) ([facility]) I50.43    Acute on chronic renal insufficiency N28.9, N18.9    S/P BKA (below knee amputation) unilateral, right ([facility]) Z89.511    Paroxysmal atrial fibrillation ([facility]) I48.0    Pressure ulcer of left leg, stage 4 ([facility]) L89.894    Stage 3a chronic kidney disease ([facility]) N18.31    Anasarca associated with disorder of kidney N04.9       Assessment of today's active condition(s): DM, paraplegia, lymphedema, also fluid overload from renal and cardiac disease, prior right BKA; chronically open back wound related to hardware infection and chronic osteo; right knee lymphedema (initially device-related pressure) ulcer delicately healed; ischium should do better now that he's in his own bed; new left leg ulcer needs some debridement soon, when not so inflamed and friable and easily bleeding - distal one doing well. Factors contributing to occurrence and/or persistence of the chronic ulcer include lymphedema, diabetes, chronic pressure, decreased mobility, shear force, obesity and anticoagulation therapy. Medical necessity of today's visit is shown by the above documentation. Sharp debridement is not indicated today, based upon the exam findings in the wound(s) above. Procedure note:     Consent obtained. Time out performed per [facility]. Anesthetic  Anesthetic: 4% Lidocaine Cream     To encourage better epithelial cell coverage, I did use AgNO3 to chemically cauterize hypergranulation tissue on the back (midline) and left , distal lower leg ulcer(s). This was tolerated well, with no significant pain or skin injury. Discharge plan:     Treatment in the wound care center today, per RN documentation: [REMOVED] Wound 06/25/21 #72, Right Knee Cluster, Pressure Injury, Stage 2, Onset 6/22/21-Dressing/Treatment:  (xeroform,foam,cast padding kerlix, stockingette)  Wound 01/31/22 Coccyx Large open area present to coccyx and buttocks area. Non-blanchable redness.-Dressing/Treatment:  (kiran, mepilexx border)  Wound 11/05/21 #82, Left Posterior Lower Leg Proximal, pressure Unstageable,, onset 11/02/2021-Dressing/Treatment:  (triad, mepilex border)  Wound 02/18/22 #80, Left Posterior Lower Leg Distal, pressure, stage 2, onset 2/15/22-Dressing/Treatment:  (collagen, mepilex border)  Wound 11/05/21 # 81, mid-back, Surgical, full thickness, onset June 2015-Dressing/Treatment:  (triamcinalone, AGAlg, drawtex, Mepilex border).     Continue to keep working on glucose control, protein intake, offloading, LLE compression therapy. A bit of triamcinolone to the two more friable hypergranular areas on the T-spine wound today. Home treatment: good handwashing before and after any dressing changes. Cleanse wound with saline or soap & water before dressing change. May use Vaseline (petrolatum), Aquaphor, Aveeno, CeraVe, Cetaphil, Eucerin, Lubriderm, etc for dry skin. Dressing type for home: basically as above, weekly for the right knee and left distal lower leg, TIW or PRN for the back, ischium and proximal LLE. Written discharge instructions given to patient. Follow up in 2 weeks.     Electronically signed by Sol Putnam MD on 2/20/2022 at 10:11 AM.

## 2022-02-21 PROBLEM — Z99.2 ESRD (END STAGE RENAL DISEASE) ON DIALYSIS (HCC): Status: ACTIVE | Noted: 2022-01-01

## 2022-02-21 PROBLEM — N18.6 ESRD (END STAGE RENAL DISEASE) ON DIALYSIS (HCC): Status: ACTIVE | Noted: 2022-01-01
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
## 2022-02-25 NOTE — PROGRESS NOTES
Reassessment completed (see Flowsheet). All ICU lines remain intact, ICU monitoring continued-   Infusing:  Levo, Vaso, Epi (See MAR). Pt is unresponsive. Trial completed on Travel Vent- goal to take patient to CT for drain placement. Family At bedside and informed of  Risk vs Benefit. Lung sounds Diminished. Dr Robby Villalpando at bedside with radiologist for U/S. Continuing to monitor. 6:20 PM    Pt arrived back to ICU from CT. Pt placed on Artic sun- goal to get patient normothermic. Current Temp- 107 bladder. CRRT to start. Goal to keep patient even. 6:58 PM    CRRT running, lines reversed. Filter 209. Family at bedside and updated. [place]
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
## 2022-03-04 NOTE — PLAN OF CARE
No changes in primary wound care regime. Patient continues to have home health in place for intermittent wound care in the home. Discharge instructions reviewed with patient, all questions answered, copy given to patient. Dressings were applied to all wounds per M.D. Instructions at this visit.

## 2022-03-04 NOTE — PROGRESS NOTES
[address] [facility] Physician Orders and Discharge [address]  [address], [address]  [place], [facility]  Telephone: [phone]      Fax: [phone]        Your home care company:   [facility]     Your wound-care supplies will be provided by:  Home care     NAME:  Bret Welsh of BIRTH:  1965  PRIMARY DIAGNOSIS FOR WOUND CARE CENTER:  Pressure Ulcer      Wound cleansing:   Do not scrub or use excessive force. Wash hands with soap and water before and after dressing changes. Prior to applying a clean dressing, cleanse wound with normal saline, wound cleanser, or mild soap and water. Ask your physician or nurse before getting the wound(s) wet in the shower. Wound care for home:        Interdry to skin folds As needed     Left Great Toe:  Antibiotic ointment   Dry dressing  Change daily    Left Distal Leg:  Vashe,Collagen with or without silver, Mepilex border   Leave in place for a week then change     Left Proximal Leg:  Triad to wound (if drainage is minimal then add some antibiotic ointment to triad)  Cover with a dry dressing  Change 3 x week     Make sure you are wearing your offloading boots when in bed      Right Knee:  Betadine  Xeroform   Plain foam  cast padding x 2  Kerlix, then a big piece of stockinet to keep dressing in place  Leave in place for a week then change     Chronic Open Back Wound:   Dr. Mago Miller used Silver Nitrate on your wound today.   The wound will be black or silver in appearance  for the next several days, this is normal.   Today in Wound Clinic only also apply Triamcinolone to the 2 nitrated areas  Spray Hypochlorous Acid into wound let sit, do not rinse  Calmoseptine to alin wound  Silver Alginate for drainage as needed make sure to use 1 big piece over back wounds not individual pieces (we are applying today) and if home care uses drawtex make sure it is 1 piece as well  Cover with a Mepilex Border making sure to cover the skin tear   Change 3 x week     Scrotum/Buttocks/Posterior Thigh:   Calmoseptine to protect skin     Please note, all wounds (unless stated otherwise here) were mechanically debrided at the time of cleansing here in the wound-care center today, so a small amount of pain, drainage or bleeding from that process might be expected, and is normal.      All products for home use, including multiple products for a single wound if applicable, are medically necessary in order to achieve the best chance at timely wound healing. See provider documentation for details if needed. Substituted dressings applied in the [address] [place] [address] today, if applicable:           New orders for this week (labs, imaging, medications, etc.):              Additional instructions for specific diagnoses:     Continue to use the HIBICLENS (or the generic version) after your bath on the areas that are more troublesome such as your knee, chest and even around the back wound, make sure you let it dry, do not rinse        F/U Appointment is with Dr. Rafi Paredes in 2 weeks   on                                   at                       .     Your nurse  is Heidi FLOWERS. If we applied slip-resistant hospital socks today, be sure to remove them at least once a day to inspect your toes or feet, even if you're not changing the wraps or dressings underneath. If you see anything concerning (redness, excess moisture, etc), please call and let us know right away.      Should you experience any significant changes in your wound(s) (including redness, increased warmth, increased pain, increased drainage, odor, or fever) or have questions about your wound care, please contact the [address] [place] [address] at [phone] Monday-Thursday from 8:00 am - 4:30 pm, or Friday from 8:00 am - 2:30 pm.  If you need help with your wound outside these hours and cannot wait until we are again available, contact your home-care company (if applicable), your PCP, or go to the nearest emergency room

## 2022-03-09 NOTE — CARE COORDINATION
Ambulatory Care Coordination Note  3/9/2022  CM Risk Score: 16  Charlson 10 Year Mortality Risk Score: 100%     ACC: Solitario Epstein RN    Summary Note: [facility] spoke with patient for outreach. Patient is currently still going to dialysis. He feels his fluid volumes have been doing well. He is on a 1500 ml fluid restriction, but states he only usually uses about 1000 to 1200 ml. He is on 2000 mg low sodium diet at home. Patient continues to not have any additional home supports at this time. [facility] has not been able to provide home support due staff shortage. His mother is still currently assisting him. He stated that he checks in with he [facility] CM weekly for updates. Reviewed CHF zone tools and stressed warning signs of exacerbation. Discussed with patient expected education to be received in the mail regarding including zone tool reminder sheet, Spring cleaning in the medicine cabinet, and healthy diet reminders. Encouraged daily weights, low sodium diet, adhering to fluid recommended fluid restrictions, and reporting changes in symptoms promptly. Reminder on how to reach after hours support and contact information for [facility] again provided. Plan      Patient is currently active with [facility]          Care Coordination Interventions    Program Enrollment: Complex Care  Referral from Primary Care Provider: No  Suggested Interventions and Community Resources  Home Care Waiver: Completed (Comment: active with Waiver program )  Home Health Services: Completed (Comment: [facility] [place], [facility]. for skilled services)  Medi Set or Pill Pack: Declined (Comment: manages his meds )  Transportation Support: Declined  Zone Management Tools: Completed (Comment: chf and diabetes zone tools )         Goals Addressed    None         Prior to Admission medications    Medication Sig Start Date End Date Taking?  Authorizing Provider   magnesium oxide (MAG-OX) 400 MG tablet TAKE THREE TABLETS BY MOUTH TWICE A DAY 2/21/22   Pamela Walls MD metoprolol succinate (TOPROL XL) 25 MG extended release tablet Take 1 tablet by mouth daily 2/13/22 3/15/22  Mindy Ross MD   metOLazone (ZAROXOLYN) 5 MG tablet Take 1 tablet by mouth daily 1/21/22   Oracio Holbrook MD   MAGNESIUM-OXIDE 400 (241.3 Mg) MG TABS tablet TAKE THREE TABLETS BY MOUTH TWICE A DAY  Patient taking differently: 1,200 mg 2 times daily  1/4/22   Jose Harrington PA-C   torsemide (DEMADEX) 100 MG tablet Take 1 tablet by mouth daily 12/31/21   Yefri Jeffery MD   Respiratory Therapy Supplies (NEBULIZER/TUBING/MOUTHPIECE) KIT 1 kit by Does not apply route daily as needed (SOB) 12/28/21   Michael Moraes MD   triamcinolone (KENALOG) 0.1 % cream Apply 2 times daily to the rash on your arms. 12/22/21   Tyson Mccartney MD   nitroGLYCERIN (NITROSTAT) 0.4 MG SL tablet DISSOLVE 1 TAB UNDER TONGUE FOR CHEST PAIN - IF PAIN REMAINS AFTER 5 MIN, CALL 911 AND REPEAT DOSE.  MAX 3 TABS IN 15 MINUTES 11/23/21   Tristan Garcia MD   apixaban (ELIQUIS) 5 MG TABS tablet Take 1 tablet by mouth 2 times daily TAKE ONE TABLET BY MOUTH TWICE A DAY 11/18/21   Oracio Holbrook MD   Fluticasone furoate-vilanterol (BREO ELLIPTA) 200-25 MCG/INH AEPB inhaler Inhale 1 puff into the lungs daily 11/12/21   Michael Moraes MD   albuterol sulfate HFA (VENTOLIN HFA) 108 (90 Base) MCG/ACT inhaler Inhale 2 puffs into the lungs 4 times daily as needed for Wheezing 11/12/21   Michael Moraes MD   albuterol (PROVENTIL) (5 MG/ML) 0.5% nebulizer solution Take 0.5 mLs by nebulization 4 times daily as needed for Wheezing 11/12/21 11/12/22  Michael Moraes MD   pantoprazole (PROTONIX) 40 MG tablet Take 1 tablet by mouth daily 10/29/21   Tristan Garcia MD   traZODone (DESYREL) 50 MG tablet TAKE ONE TABLET BY MOUTH ONCE NIGHTLY 10/29/21   Tristan Garcia MD   montelukast (SINGULAIR) 10 MG tablet TAKE ONE TABLET BY MOUTH DAILY 10/25/21   Michael Moraes MD   insulin glargine (LANTUS) 100 UNIT/ML injection vial Inject 60 Units into the skin 2 times daily 10/1/21 3/4/22  Baldomero Bundy PA-C   insulin lispro (HUMALOG) 100 UNIT/ML injection vial Inject 20 Units into the skin 3 times daily (before meals) INJECT 20 UNITS UNDER THE SKIN THREE TIMES A DAY BEFORE MEALS + SLIDING SCALE 10/1/21   Baldomero Bundy PA-C   Menthol-Methyl Salicylate (THERA-GESIC) 0.5-15 % CREA Apply topically as needed     Historical Provider, MD   Respiratory Therapy Supplies ([facility]) TRAE To be used PRN. 6/10/21   Juancarlos Bradshaw MD   Insulin Syringe-Needle U-100 ([employer] INSULIN SYRINGE) 31G X 5/16\" 1 ML MISC 1 each by Does not apply route daily 4/13/21   NONI Alford   polyethylene glycol [facility]) 17 GM/SCOOP powder Take 1 scoop daily. Patient taking differently: as needed Take 1 scoop daily. 9/4/20   Sherrill Parks MD   senna (SENNA-LAX) 8.6 MG tablet TAKE TWO TABLETS BY MOUTH DAILY 8/28/20   Sherrill Parks MD   docusate sodium (STOOL SOFTENER) 100 MG capsule TAKE TWO CAPSULES BY MOUTH DAILY 8/28/20   Sherrill Parks MD   Alirocumab (PRALUENT) 150 MG/ML SOAJ Inject 150 mg into the skin every 30 days  7/21/20   Historical Provider, MD   ferrous sulfate (IRON 325) 325 (65 Fe) MG tablet TAKE ONE TABLET BY MOUTH DAILY WITH BREAKFAST 5/15/20   Sherrill Parks MD   Insulin Syringe-Needle U-100 ([employer] INSULIN SYRINGE) 31G X 5/16\" 0.5 ML MISC Use 4 times daily. DX: E11.9 1/30/20   Sherrill Parks MD   Insulin Pen Needle ([employer] PEN NEEDLES 31G) 31G X 8 MM MISC Use with Humalog.   DX:E11.9 12/17/19   Sherrill Parks MD   aspirin 81 MG tablet Take 81 mg by mouth nightly  10/16/17   Historical Provider, MD       Future Appointments   Date Time Provider Alyson Cardona   3/10/2022  1:30 PM Juancarlos Bradshaw MD [facility] PULM [facility]   3/17/2022 12:30 PM MD [facility]   3/28/2022  2:45 PM Gato Alba MD [facility] CLER CAR [facility]   4/1/2022 10:15 AM MD [facility]   5/5/2022 11:30 AM NANCY Gorman., MD Merline Rudd [facility]      and   Congestive Heart Failure Assessment    Are you currently restricting fluids?: Other (Comment: 1500 cc fluid restriction )  Do you understand a low sodium diet?: Yes  Do you understand how to read food labels?: Yes  How many restaurant meals do you eat per week?: 1-2  Do you salt your food before tasting it?: No         Symptoms:
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
## 2022-03-17 NOTE — PLAN OF CARE
[address] [facility] Physician Orders and Discharge [address], [address] [facility], [facility]  Telephone: [phone]      Fax: [phone]        Your home care [facility] wound-care supplies will be provided by: [facility] care     NAME: Maria Luisa Bass Jr   DATE of BIRTH:  1965  PRIMARY DIAGNOSIS FOR WOUND CARE CENTER:  Pressure Ulcer      Wound cleansing:   Do not scrub or use excessive force. Wash hands with soap and water before and after dressing changes. Prior to applying a clean dressing, cleanse wound with normal saline, wound cleanser, or mild soap and water. Ask your physician or nurse before getting the wound(s) wet in the shower.                Wound care for home:        Interdry to skin folds As needed     Left Great Toe:  Antibiotic ointment   Dry dressing  Change daily     Left Proximal and Distal Leg:   used Silver Nitrate on your wound today.   The wound will be black or silver in appearance  for the next several days, this is normal.   Vashe,Collagen with or without silver, Mepilex border   Leave in place for a week then change  Make sure you are wearing your offloading boots when in bed      Right Knee: (please send home with extra set of dressings)  Betadine  Xeroform   Plain foam  cast padding x 2  Kerlix, then a big piece of stockinet to keep dressing in place  Leave in place for a week then change     Chronic Open Back Wound:   Dr. Alf Henderson used Silver Nitrate on your wound today.  The wound will be black or silver in appearance  for the next several days, this is normal.   Today in Wound Clinic only also apply Triamcinolone to the 2 nitrated areas  Spray Hypochlorous Acid into wound let sit, do not rinse  Calmoseptine to alin wound  Silver Alginate for drainage as needed make sure to use 1 big piece over back wounds not individual pieces (we are applying today) and if home care uses drawtex make sure it is 1 piece as well  Cover with a Mepilex Border  Change 3 x week     Scrotum/Buttocks/Posterior Thigh:   Calmoseptine to protect skin     Please note, all wounds (unless stated otherwise here) were mechanically debrided at the time of cleansing here in the wound-care center today, so a small amount of pain, drainage or bleeding from that process might be expected, and is normal.      All products for home use, including multiple products for a single wound if applicable, are medically necessary in order to achieve the best chance at timely wound healing. See provider documentation for details if needed.     Substituted dressings applied in the [facility] today, if applicable:           New orders for this week (labs, imaging, medications, etc.):              Additional instructions for specific diagnoses:     Continue to use the HIBICLENS (or the generic version) after your bath on the areas that are more troublesome such as your knee, chest and even around the back wound, make sure you let it dry, do not rinse        F/U Appointment is with Dr. Daniela Maldonado in 2 weeks   on                                   at                       .     Your nurse  is Heidi FOLWERS.      If we applied slip-resistant hospital socks today, be sure to remove them at least once a day to inspect your toes or feet, even if you're not changing the wraps or dressings underneath.  If you see anything concerning (redness, excess moisture, etc), please call and let us know right away.     Should you experience any significant changes in your wound(s) (including redness, increased warmth, increased pain, increased drainage, odor, or fever) or have questions about your wound care, please contact the [address] [place] [address] at [phone] Monday-Thursday from 8:00 am - 4:30 pm, or Friday from 8:00 am - 2:30 pm.  If you need help with your wound outside these hours and cannot wait until we are again available, contact your home-care company (if applicable), your PCP, or go to the nearest emergency room

## 2022-03-17 NOTE — PROGRESS NOTES
No changes to current plan of care. No debridement today, silver nitrate per Dr. Glendy Reis. Follow up in [address] [place] [address] in 2 weeks as ordered. Pt. Aware to call sooner with any problems or questions/concerns. MD orders/D/C instructions reviewed with patient, all questions answered; copy of instructions given to patient.

## 2022-03-22 NOTE — CARE COORDINATION
ACM attempted to contact patient for f/u after completing CT. Patient UTR. ACM left VM containing contact information.
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
## 2022-04-01 NOTE — PROGRESS NOTES
[facility] Physician Orders and Discharge [facility]  [address], [address], [facility]  Telephone: [phone]      Fax: [phone]        Your home care [facility] wound-care supplies will be provided by: [facility] care     NAME: Doris Parks Jr   DATE of BIRTH:  1965  PRIMARY DIAGNOSIS FOR WOUND CARE CENTER:  Pressure Ulcer      Wound cleansing:   Do not scrub or use excessive force. Wash hands with soap and water before and after dressing changes. Prior to applying a clean dressing, cleanse wound with normal saline, wound cleanser, or mild soap and water.  Ask your physician or nurse before getting the wound(s) wet in the shower.                Wound care for home:        Interdry to skin folds As needed       Left Shin and Lateral Lower Leg:   used Silver Nitrate on your wound today.  The wound will be black or silver in appearance  for the next several days, this is normal.   Vashe,Collagen with or without silver, Mepilex border   Leave in place for a week then change  Make sure you are wearing your offloading boots when in bed      Right Knee: (please send home with extra set of dressings)  Betadine (generou amount to everything)  1 layer of cast pad then Plain foam then remaining cast padding x 2  Kerlix, then a big piece of stockinet to keep dressing in place  Leave in place for a week then change (be very careful when removing the first layer of cast padding)     Chronic Open Back Wound:   Dr. Nash Tinoco used Silver Nitrate on your wound today.  The wound will be black or silver in appearance  for the next several days, this is normal.   Today in Wound Clinic only also apply Triamcinolone to the 2 nitrated areas  Spray Hypochlorous Acid into wound let sit, do not rinse  Calmoseptine to alin wound  Silver Alginate for drainage as needed make sure to use 1 big piece over back wounds not individual pieces (we are applying today) and if home care uses drawtex make sure it is 1 piece as well  Cover with a Mepilex Border  Change 3 x week     Scrotum/Buttocks/Posterior Thigh:   Calmoseptine to protect skin     Please note, all wounds (unless stated otherwise here) were mechanically debrided at the time of cleansing here in the wound-care center today, so a small amount of pain, drainage or bleeding from that process might be expected, and is normal.      All products for home use, including multiple products for a single wound if applicable, are medically necessary in order to achieve the best chance at timely wound healing. See provider documentation for details if needed.     Substituted dressings applied in the [facility] today, if applicable:           New orders for this week (labs, imaging, medications, etc.):              Additional instructions for specific diagnoses:     Continue to use the HIBICLENS (or the generic version) after your bath on the areas that are more troublesome such as your knee, chest and even around the back wound, make sure you let it dry, do not rinse        F/U Appointment is with Dr. Jorge Lopez in 2 weeks   on                                   at                       .     Your nurse  is Heidi FLOWERS.      If we applied slip-resistant hospital socks today, be sure to remove them at least once a day to inspect your toes or feet, even if you're not changing the wraps or dressings underneath.  If you see anything concerning (redness, excess moisture, etc), please call and let us know right away.     Should you experience any significant changes in your wound(s) (including redness, increased warmth, increased pain, increased drainage, odor, or fever) or have questions about your wound care, please contact the [address] [place] [address] at [phone] Monday-Thursday from 8:00 am - 4:30 pm, or Friday from 8:00 am - 2:30 pm.  If you need help with your wound outside these hours and cannot wait until we are again available, contact your home-care company (if applicable), your PCP, or go to the nearest emergency room

## 2022-04-01 NOTE — PLAN OF CARE
Right knee is noted to be greatly improved and will continue to have protective dressing in place. [facility] for intermittent dressing changes in the home. Follow up in wound clinic as ordered. Discharge instructions reviewed with patient, all questions answered, copy given to patient. Dressings were applied to all wounds per M.D. Instructions at this visit.
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
## 2022-04-16 PROBLEM — E87.70 HYPERVOLEMIA: Status: RESOLVED | Noted: 2022-01-01 | Resolved: 2022-01-01
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
## 2022-04-18 NOTE — LETTER
[address] [place]  [address] [place] [address] Drive [address]  Phone: [phone]  Fax: [phone]    Rossana Hess MD    April 19, 2022     Jenaro Acosta MD  [address] [place] [id number]    Patient: Zenaida Morgan   MR Number: [id number]   YOB: 1965   Date of Visit: 4/18/2022       Dear Jenaro Acosta:    Thank you for referring Zenaida Morgan to me for evaluation/treatment. Below are the relevant portions of my assessment and plan of care. If you have questions, please do not hesitate to call me. I look forward to following Hira Degroot along with you.     Sincerely,      Rossana Hess MD

## 2022-04-18 NOTE — PATIENT INSTRUCTIONS
Plan:  1. Decrease your Eliquis to 2.5 mg   -cut the pills you have at home in half   -we do this when you are on dialysis  2. Now that you are on dialysis I would like for you to try taking Entresto 24-26 twice Again   -on dialysis days take the medication after your finish dialysis and just as soon as you get home and then take it again at bed time   -hopefully this will help your heart strength and if it doesn't then you can discuss the defibrillator with Dr. Kednra Berry. 3. Current medications reviewed. Refills given as warranted. 4. Follow up with Dr. Kendra Berry as scheduled in June.   5. Follow up with Dr. Destiney Zhu about the color and smell of your urine and about how much fluid intake you can have each day    Follow up with me in mid to late July

## 2022-04-19 NOTE — TELEPHONE ENCOUNTER
Office has made multiple attempts to remind pt to get CXR, but pt still has not completed. Please be aware.

## 2022-04-20 NOTE — PLAN OF CARE
[facility]  [address], [address]  [place], [facility]  Telephone: [phone]      Fax: [phone]        Your home care company:   [facility]     Your wound-care supplies will be provided by:  Home care     NAME:  Leeann Mike of BIRTH:  1965  PRIMARY DIAGNOSIS FOR WOUND CARE CENTER:  Pressure Ulcer      Wound cleansing:   Do not scrub or use excessive force. Wash hands with soap and water before and after dressing changes. Prior to applying a clean dressing, cleanse wound with normal saline, wound cleanser, or mild soap and water. Ask your physician or nurse before getting the wound(s) wet in the shower. Wound care for home:        Interdry to skin folds As needed      Left Shin and Posterior Lower Leg:  Vashe, Puracol Ag, Cover with larger Mepilex border   Leave in place for a week    Make sure you are wearing your offloading boots when in bed      Right Knee:   No Dressing at this time. Chronic Open Back Wound: Today in [address] [place] [address] only also apply Triamcinolone to the hypergranulated areas  Spray Hypochlorous Acid into wound let sit, do not rinse  Calmoseptine to alin wound & denuded areas  Silver Alginate for drainage as needed make sure to use 1 big piece over back wounds not individual pieces (we are applying today) and if home care uses drawtex make sure it is 1 piece as well  Cover with a Mepilex Border  Change 3 x week     Scrotum/Buttocks/Posterior Thigh:   Calmoseptine to protect skin     Please note, all wounds (unless stated otherwise here) were mechanically debrided at the time of cleansing here in the wound-care center today, so a small amount of pain, drainage or bleeding from that process might be expected, and is normal.      All products for home use, including multiple products for a single wound if applicable, are medically necessary in order to achieve the best chance at timely wound healing. See provider documentation for details if needed. Substituted dressings applied in the [facility] today, if applicable:     N/a       New orders for this week (labs, imaging, medications, etc.):     Harriett Seal with [facility] took measurements & working on back brace to protect back from ongoing soft tissue injury when transferring. Additional instructions for specific diagnoses:     Continue to use the HIBICLENS (or the generic version) after your bath on the areas that are more troublesome such as your knee, chest and even around the back wound, make sure you let it dry, do not rinse        F/U Appointment is with Dr. Sargario Ledesma in 2 weeks   on                                   at                       .     Your nurse  is Heidi FLOWERS. If we applied slip-resistant hospital socks today, be sure to remove them at least once a day to inspect your toes or feet, even if you're not changing the wraps or dressings underneath. If you see anything concerning (redness, excess moisture, etc), please call and let us know right away.      Should you experience any significant changes in your wound(s) (including redness, increased warmth, increased pain, increased drainage, odor, or fever) or have questions about your wound care, please contact the [facility] at [phone] Monday-Thursday from 8:00 am - 4:30 pm, or Friday from 8:00 am - 2:30 pm.  If you need help with your wound outside these hours and cannot wait until we are again available, contact your home-care company (if applicable), your PCP, or go to the nearest emergency room

## 2022-04-20 NOTE — CARE COORDINATION
[facility] 45 Transitions Follow Up Call    2022    Patient: Josefina Jerez  Patient : 1965   MRN: [id number]  Reason for Admission:   Discharge Date: 2/15/22 RARS: Readmission Risk Score: 27.5 ( )     Left HIPPA compliant message regarding the nature of the call and a request for a return call with my contact information    Pt did keep wound clinic appt    Pt called back and after 2 IDs I spoke with him. He stated his wound care was going well. He states his leg wounds are mostly healed and getting better. He is wearing a sleeve for his edema. His back is still open and they gave him a brace today to help with healing. He has HD 3 x/ week and had blood pressure issues at last HD so he feels like he has extra fluid. Reviewed low sodium and fluid  Restriction and carb counting with him. He declined a dietician as he states one available to him. His most recent blood work came back good including BUN, CR and H/H. He does use one Ensure a day. Educated to ask MD about Nepro Carbsteady. Agreeable to calls. VICK WilsonN, [phone]  [facility] Transition Nurse         Care Transitions Subsequent and Final Call    Subsequent and Final Calls  Care Transitions Interventions  Other Interventions:            Follow Up  Future Appointments   Date Time Provider Department Center   2022 10:30 AM Kamran Pollock MD [facility]   2022 10:30 AM Beba العلي MD [facility]   2022 10:30 AM Kamran Pollock MD [facility]   2022 10:30 AM Kamran Pollock MD [facility]   6/10/2022 10:30 AM Kamran Pollock MD [facility]   2022  9:00 AM Spencer Serrato MD [facility]   2022 10:30 AM Kamran Pollock MD [facility]   2022 10:30 AM Kamran Pollock MD [facility]   2022 12:30 PM Alli Jenkins MD [facility]   2022 10:30 AM Kamran Pollock MD [facility]   8/5/2022 10:30 AM MD Ivan Solorio [facility]   8/19/2022 10:30 AM MD Ivan Solorio [facility]   9/2/2022 10:30 AM MD Ivan Solorio [facility]   9/15/2022 11:30 AM MD OJ [facility]   9/16/2022 10:30 AM MD Ivan Solorio [facility]   9/30/2022 10:30 AM MD Ivan Solorio [facility]       Nash Art, VICKN, RN   [facility] Nurse  [phone]

## 2022-04-20 NOTE — PLAN OF CARE
Right knee remains healed, will attempt to not place dressing this week. Pt. Instructed to be extra careful to not reopen wound. Left leg wounds stable, no debridement, will place dressings to remain in place for the week per MD orders. Pt. Wearing velcro compression garment for edema control. Back wounds stable, no debridement or Ag Nitrate today. Will cont. With current wound care regime with dressings on back. Harriett Seal with [facility] here to take measurements & assess for back brace to protect from ongoing soft tissue injury when transferring from bed to stretcher when going outside the home for dialysis & MD appts. F/u in [facility] in 2 weeks as ordered, pt. Aware to call sooner with any changes or questions/concerns. Discharge instructions reviewed with patient, all questions answered, copy given to patient. Dressings were applied to all wounds per M.D. Instructions at this visit.

## 2022-04-21 PROBLEM — A41.9 SEPSIS (HCC): Status: ACTIVE | Noted: 2022-01-01

## 2022-04-21 NOTE — ED PROVIDER NOTES
I independently performed a history and physical on Torrie Riedel. All diagnostic, treatment, and disposition decisions were made by myself in conjunction with the advanced practice provider/resident. For further details of [facility] Lozano [place] emergency department encounter, please see the MELVI/resident's documentation. I personally saw the patient and performed a substantive portion of the visit including all aspects of the medical decision making. Briefly, this is a 30-year-old male with history of quadriplegia end-stage renal disease on dialysis coming here for evaluation of hypotension during dialysis. Only underwent about 50 minutes of dialysis today, stopped early because of hypotension. He reports fever to 99 °F today. He also reports abdominal distention. Patient was noted to be hypoxic on arrival and he is increased on baseline O2 requirements. He reports some mild shortness of breath. ED evaluation include laboratory evaluation, cardiac panel, CT chest and abdomen pelvis to eval for pulmonary embolism, infectious etiology of his symptoms. He will likely require admission because of oxygen requirement. We will obtain blood cultures. EKG  The Ekg interpreted by myself in the emergency department in the absence of a cardiologist.  sinus tachycardia, qzzi=481 with a rate of 109  Axis is   Normal  QTc is  within an acceptable range  Intervals and Durations are unremarkable. No specific ST-T wave changes appreciated. Changes in leads I, aVL, T wave inversion lead aVL, unchanged when compared to prior EKG dated January 30, 2022  No evidence of acute ischemia. I personally saw the patient and independently provided 15 minutes of non-concurrent critical care out of the total shared critical care time provided.      Comment: Please note this report has been produced using speech recognition software and may contain errors related to that system including errors in grammar, punctuation, and spelling, as well as words and phrases that may be inappropriate. If there are any questions or concerns please feel free to contact the dictating provider for clarification.      Fran Singleton MD  04/21/22 6049

## 2022-04-21 NOTE — PROGRESS NOTES
Pharmacy to dose Vancomycin per ED.   Indication: Sepsis of unknown origin  BUN/CR  38/1.8 WBC 13.3  Wt = 113 kg,   Vanc 1500mg times one  Spike Clifton Pharm D 4/21/20224:02 PM  .

## 2022-04-21 NOTE — ED NOTES
1411 12 Lead EKG completed results given to Dr. Patrecia Cowden for review      Pura Field  04/21/22 1411

## 2022-04-21 NOTE — ED NOTES
Delgado exchanged for urine culture.      [address] [facility], LIONEL  04/21/22 8062

## 2022-04-21 NOTE — ED NOTES
1800- called pulmonary for a consult. Elizabeth Pacheco  04/21/22 1826    1835- Dr. Jay Ko called back and spoke to [facility].       Elizabeth Pacheco  04/21/22 1846

## 2022-04-21 NOTE — ED NOTES
Patient with hypotension, SBP 70's, Estefania CHENEY notified new order for IVF bolus given, no pressors at this time. Estefania notified patient has port access if pressors are required. IVP Lasix held r/t hypotension. IV insulin held for FSBS 58, D 10 infusion given. Patient awake and alert, able to drink Lokelma.       Jorge Beckham, LIONEL  04/21/22 5219

## 2022-04-21 NOTE — ED NOTES
Estefania CHENEY notified of BP 81/60 MAP 65 after 1 L IVF.      [address] [facility], LIONEL  04/21/22 7964

## 2022-04-21 NOTE — ED NOTES
Lemuel Cellar wife [phone]    Benja Caballero son [phone]     Mandy Bustamante, LIONEL  04/21/22 8863

## 2022-04-21 NOTE — PROGRESS NOTES
Patient's GFR is 39. [place], [place] and Dr. Lynnette Johnson aware. Patient is to get dialysis today.

## 2022-04-21 NOTE — ED NOTES
Neha CHENEY at bedside made aware of low BP and increased need for Levophed, notified about FSBS 62 and current interventions.      Saroj Beckham RN  04/21/22 3010

## 2022-04-21 NOTE — ED NOTES
Dr. Dhaliwal Manual at bedside VO to increase Levophed to 30 mcg/min     Doug Beckham RN  04/21/22 9792

## 2022-04-21 NOTE — ED NOTES
Akiko Finley a second time for a consult. Pearl Farris  04/21/22 1853    1854- Dr. Ashish Finley called back and spoke with [facility].       Pearl Farris  04/21/22 1856

## 2022-04-21 NOTE — ED NOTES
1658- Perfect served the Hospitalist for a consult. Viveve  04/21/22 1700    1753- second time Perfect serving the Hospitalist for a consult. Viveve  04/21/22 1756    1800- Orders where obtained.       Viveve  04/21/22 1815

## 2022-04-21 NOTE — ED PROVIDER NOTES
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 IV Merrem. He did receive 1 L of fluids as well. We will hold on septic fluids as patient is end-stage renal disease at this time. Patient will be admitted to ICU. Hospitalist was consulted. Patient admitted. In addition I did consult pulmonology and critical care. I Spoke to Dr. Brittny Tena with pulmonology as well as patient will be going to the ICU for septic shock. Levophed was started at bedside here in the ED. In addition to the levophed, Dr. Brittny Tena recommended another 500 bolus of fluids as well as 100 IV of hydrocortisone and vasopressin. We will continue to monitor patient's blood pressure. Upon reevaluation his blood pressure does appear to be responding well prior to admission to ICU. Patient also became hypoxic on his 4 L and was placed on a nonrebreather here in the ED prior to admission to ICU.   Patient admitted to ICU at this time.    FINAL IMPRESSION      1. Septic shock ([facility] [address]    2. Pneumonia due to infectious organism, unspecified laterality, unspecified part of lung    3. Acute cystitis with hematuria    4. Hyperkalemia    5. Hypoglycemia    6. ESRD on dialysis [facility])          DISPOSITION/PLAN   DISPOSITION        PATIENT REFERRED TO:  No follow-up provider specified.     DISCHARGE MEDICATIONS:  New Prescriptions    No medications on file       DISCONTINUED MEDICATIONS:  Discontinued Medications    No medications on file              (Please note that portions of this note were completed with a voice recognition program.  Efforts were made to edit the dictations but occasionally words are mis-transcribed.)    Jasmin Johnson PA-C (electronically signed)            Jasmin Johnson PA-C  04/21/22 [address], [place]ANC[place]  04/21/22 1912

## 2022-04-22 PROBLEM — B95.2 ENTEROCOCCAL BACTEREMIA: Status: ACTIVE | Noted: 2022-01-01

## 2022-04-22 PROBLEM — R78.81 ENTEROCOCCAL BACTEREMIA: Status: ACTIVE | Noted: 2022-01-01

## 2022-04-22 PROBLEM — R65.21 SEPTIC SHOCK (HCC): Status: ACTIVE | Noted: 2022-01-01

## 2022-04-22 PROBLEM — L97.811 ULCER OF RIGHT KNEE, LIMITED TO BREAKDOWN OF SKIN (HCC): Status: RESOLVED | Noted: 2021-01-01 | Resolved: 2022-01-01

## 2022-04-22 NOTE — ACP (ADVANCE CARE PLANNING)
Advance Care Planning     General Advance Care Planning (ACP) Conversation    Date of Conversation: 4/21/2022  Conducted with: Patient with Decision Making Capacity and pt's wife Rachell at bedside. RN stated pt was alert and oriented at the time of the conversation. Healthcare Decision Maker:    Primary Decision Maker: Koby Mejias Child - [phone]    Primary Decision Maker: Erinn Hardy - Spouse - [phone]  Click here to complete Healthcare Decision Makers including selection of the Healthcare Decision Maker Relationship (ie \"Primary\"). Today we documented desired Decision Maker(s), who is (are) NOT Legal Next of Kin. ACP documents are required for decision maker authority. Pt's spouse is next of kin. RN will consult spiritual care when appropriate as his son is also listed as primary decision maker. Content/Action Overview:    Reviewed DNR/DNI and patient elects Full Code (Attempt Resuscitation). Pt states he would want CPR and a Vent if medically necessary.     RN present during conversation     Length of Voluntary ACP Conversation in minutes:  <16 minutes (Non-Billable)    CONRAD Barrios

## 2022-04-22 NOTE — PROCEDURES
ENDOTRACHEAL INTUBATION    INDICATION: Life threatening respiratory failure    TIME OUT: taken    SEDATION: etomidate and versed    PROCEDURE: Using video laryngoscopy, the vocal cords were well visualized and an endotracheal tube was place directly through the cords. Good breath sounds auscultated bilaterally without sounds over abdomen. Good return of CO2 on the monitor. CXR shows good positioning.

## 2022-04-22 NOTE — PROGRESS NOTES
Pharmacy Note  Vancomycin Consult    Zenaida Morgan is a 49 y.o. male started on Vancomycin for Sepsis;, aspiration pna consult received from Dr. Caridad Arteaga manage therapy. Also receiving the following antibiotics: merrem. Allergies:  Benadryl [diphenhydramine hcl], Keflex [cephalexin], Statins, Cephalexin, Morphine, Penicillins, and Sulfa antibiotics     Tmax: 103    Recent Labs     04/21/22  1354 04/22/22  0450   CREATININE 1.8* 2.1*       Recent Labs     04/21/22  1354 04/22/22  0450   WBC 13.3* 19.4*       Estimated Creatinine Clearance: 54 mL/min (A) (based on SCr of 2.1 mg/dL (H)). Intake/Output Summary (Last 24 hours) at 4/22/2022 1132  Last data filed at 4/22/2022 1030  Gross per 24 hour   Intake 2536.74 ml   Output 300 ml   Net 2236.74 ml       Wt Readings from Last 1 Encounters:   04/21/22 250 lb (113.4 kg)         Body mass index is 32.1 kg/m². Culture Date      Source                       Results  Blood: Enterococcus and Strep    Loading dose (critically ill or in ICU, require dialysis or renal replacement therapy): Vancomycin 25 mg/kg IVPB x 1 (maximum 3000 mg). Maintenance dose: 15 mg/kg (maximum: 2000 mg/dose and 4500 mg/day) starting at the next dosing interval determined by renal function  Pulse dose: fluctuating renal function, SUDHA, ESRD   Goal Vancomycin trough: 10-15 mcg/mL or 15-20 mcg/mL   Goal Vancomycin AUC: 400-600     Assessment/Plan:  Will initiate Vancomycin with a one time loading dose of 1500mg x1 given 4/21, Pt started on CRRT, Vanc level this am, 15, will enter one time dose of 1gm. Levels ordered daily, will pulse dose while on CRRT    Thank you for the consult.   Estephania Lyle Pharm D 9/27/125237:39 AM  .

## 2022-04-22 NOTE — PROGRESS NOTES
Comprehensive Nutrition Assessment    Type and Reason for Visit:  Initial,Consult (TF)    Nutrition Recommendations/Plan:   1. Continue NPO status until patient is medically cleared to receive nutrition therapy. 2. TF recommendations - ADULT TUBE FEEDING; Orogastric; Renal formula - Nepro with a goal rate of 35 ml/hr x 20 hours. Start with 20 ml/hr and increase by 15 ml every 4 hours, as tolerated by patient, until goal rate can be achieved and maintained. Water flushes, 30 ml every 4 hours or per MD guidance. Please order + administer one proteinex P2Go TWICE daily. 3. Monitor vent status, sedation type/amount (if applicable), and plan of care. 4. Monitor whether TF is started + TF rate, intake, and tolerance + water flushes + administration of one proteinex P2Go TWICE daily. 5. Please obtain an actual, current weight for this patient or document weight method for admission weight/CBW since weight method wasn't documented. 6. Monitor nutrition-related labs, colostomy output, and weight trends. Malnutrition Assessment:  Malnutrition Status:   At risk for malnutrition (Comment) (04/22/22 1413)    Context:  Acute Illness     Findings of the 6 clinical characteristics of malnutrition:  Energy Intake:  No significant decrease in energy intake  Weight Loss:  No significant weight loss     Body Fat Loss:  No significant body fat loss Orbital   Muscle Mass Loss:  No significant muscle mass loss Temples (temporalis)  Fluid Accumulation:  Moderate to Severe Extremities   Strength:  Not Performed    Nutrition Assessment:    .patient is nutritionally compromised AEB he was admitted from HD d/t hypotension and was runing a fever; dx with sepsis; he is at risk for further compromise d/t intubation status (intubated this am), fevers continue,  noted, multiple wounds, and NPO status; will continue NPO status and provide EN recommendations    Nutrition Related Findings:    pt currenlty asleep in pressure relieving bed ; intubated this am; fever 102-103 F; Na low K+ elevated; CRRT in place Wound Type: Stage IV,Pressure Injury,Stage III,Unstageable,Deep Tissue Injury       Current Nutrition Intake & Therapies:    Average Meal Intake: NPO  Average Supplements Intake: NPO  ADULT DIET; Regular; 4 carb choices (60 gm/meal); Low Potassium (Less than 3000 mg/day)    Anthropometric Measures:  Height: 6' 2\" (188 cm)  Ideal Body Weight (IBW): 190 lbs (86 kg)    Admission Body Weight: 250 lb (113.4 kg)  Current Body Weight: 250 lb (113.4 kg), 131.6 % IBW. Weight Source: Not Specified  Current BMI (kg/m2): 32.1  Usual Body Weight: 250 lb (113.4 kg)  % Weight Change (Calculated): 0   BMI Categories: Obese Class 1 (BMI 30.0-34. 9)    Estimated Daily Nutrient Needs:  Energy Requirements Based On: Kcal/kg  Weight Used for Energy Requirements: Current  Energy (kcal/day): 0443-1190 based ~ 11-14 kcal/kg cbw  Weight Used for Protein Requirements: Ideal  Protein (g/day): 172 based ~2 gr /kg ibw  Method Used for Fluid Requirements: 1 ml/kcal  Fluid (ml/day): 9804-3026    Nutrition Diagnosis:   · Inadequate oral intake related to catabolic illness,inadequate protein-energy intake,renal dysfunction as evidenced by NPO or clear liquid status due to medical condition,wounds,lab values      Nutrition Interventions:   Food and/or Nutrient Delivery: Continue NPO,Start Tube Feeding  Nutrition Education/Counseling: No recommendation at this time  Coordination of Nutrition Care: Continue to monitor while inpatient  Plan of Care discussed with: RN    Goals:     Goals: Meet at least 75% of estimated needs,Tolerate nutrition support at goal rate       Nutrition Monitoring and Evaluation:   Behavioral-Environmental Outcomes: None Identified  Food/Nutrient Intake Outcomes: Enteral Nutrition Intake/Tolerance  Physical Signs/Symptoms Outcomes: Biochemical Data,Hemodynamic Status,Fluid Status or Edema,Nutrition Focused Physical Findings,Weight    Discharge Planning: Too soon to determine     Winfield Gosselin, [address] [place] [address], LD  Contact: [phone]

## 2022-04-22 NOTE — PROGRESS NOTES
Reassessment completed (see Flowsheet). All ICU lines remain intact, ICU monitoring continued-   Infusing:  Levo, Vaso, Epi, Dobutamine, Ketamine (see MAR). CRRT to keep patient Even. Gave patient 2L bolus per Dr Jluis Nino (not included in CRRT documentation per Dr Jluis Nino). Noted purple/ Blistering to RLE- wound Care RN at bedside and informed. pt remains on Vent PEEP 10, 65%. Lung sounds Diminished  Continuing to monitor. 5:00 PM    Dr Bazzi April updated, informed of Purple blistering to RLE. I/D consulted and currently at bedside- orders to follow.

## 2022-04-22 NOTE — CARE COORDINATION
Case Management Assessment  Initial Evaluation      Patient Name: Zenaida Morgan  YOB: 1965  Diagnosis: Hyperkalemia [E87.5]  Hypoglycemia [E16.2]  ESRD on dialysis ([facility] [address] [N18.6, Z99.2]  Acute cystitis with hematuria [N30.01]  Septic shock ([facility] [address] [A41.9, R65.21]  Sepsis ([facility] [address] [A41.9]  Pneumonia due to infectious organism, unspecified laterality, unspecified part of lung [J18.9]  Date / Time: 4/21/2022  1:21 PM    Admission status/Date: 04/22/2022 Inpatient   Chart Reviewed: Yes      Patient Interviewed: Yes   Family Interviewed:  Yes - pt's spouse Amarjit Olivera, son Susanne Rubin and daughter [employer] at bedside. Son Susanne Rubin stepped out at beginning of conversation and daughter came to room    Hospitalization in the last 30 days:  No    Health Care Decision Maker :   Primary Decision MakerSilveriosariah Leger Child - [phone]    Primary Decision Maker: Cathy Carlson - Spouse - [phone]    (CM - must 1st enter selection under Navigator - emergency contact- [place] Relationship and pick relationship)   Who do you trust or have selected to make healthcare decisions for you    Met with: pt and his family at bedside    Current PCP: Jenaro Acosta MD    Financial  Medicare and [address] [place] [address] required for SNF :N          3 night stay required -  N    ADLS  Support Systems/Care Needs:    Transportation: EMS transportation  He uses [facility] Preparation: family     Housing  Living Arrangements: Pt lives at home with his mother and daughter. He has someone with him 24/7  Steps: Ramp  Intent for return to present living arrangements: Yes  Identified Issues: [id number] [address] [facility] with Home Health Care : Yes - [facility] Agency:(Services)     Passport/Waiver : Yes -   : Dinora Kraft per past CM; pt's wife stated hospital can talk with her if needed. Pt's wife and daughter wasn't sure of her name or number.                 Phone Number:  Unknown Passport/Waiver Services: [facility] but it is on hold due to staffing        Durable Medical Equiptment   DME Provider: Arianna confirmed pt has orders for 2 liters continuous   Equipment:   Walker___Cane___RTS___ BSC___Shower Chair___Hospital Bed__x_W/C_x___Other___hoyer lift_____  02 at ____Liter(s)---wears(frequency)_______ [facility] ___ CPAP__x (doesn't use)_ BiPap___   N/A____    Home O2 Use :  Yes    If No for home O2---if presently on O2 during hospitalization:  Yes  if yes CM to follow for potential DC O2 need  Informed of need for care provider to bring portable home O2 tank on day of discharge for nursing to connect prior to leaving:   Yes  Verbalized agreement/Understanding:   Yes    Community Service Affiliation  Dialysis:  Yes -     · [facility]   · Location: [place]   · Dialysis Schedule: Tuesday, Thursday, Saturday   · Phone:  [phone]  · Fax: Other Community Services: Wound Care     DISCHARGE PLAN: Explained Case Management role/services. Chart review completed. Completed Interdisciplinary rounds with ICU staff. At the time of the assessment with RN present, pt was alert and oriented. Pt states he plans on returning home with his family. He would like to use [facility] first if possible and if not, then first opening. They denied needs for CM at this time. CM will continue to follow and assist. Please notify CM if needs or concerns arise. Rufus Schirmer MSW, BETHELW-S    Addendum at 9:50am: Offered spiritual care to meet with pt's family and they declined at this time.

## 2022-04-22 NOTE — PROGRESS NOTES
Shift assessment was completed (see flow sheet). Pt is A/O, complains of Generalized pain. Temp Noted to be greater than 102. PRN tylenol to follow (see MAR). Respirations are even/ Shallow, unlabored, with Diminished sounds. On Vapotherm, settings 25L 60%, increasing O2 need- RT called. Scheduled medications to follow- Keeping patient on Ice chips and sips, d/t changing condition. Infusing:  Vaso, Levo. Started on Dopamine per verbal direction of Nocturnist (see MAR). Call light within reach. Bed in lowest position. Bed alarm on, Family at bedside and updated. Will continue to monitor. Patient is not able to demonstrated the ability to move from a reclining position to an upright position within the recliner.  however patient is alert, oriented and able to provide informed consent

## 2022-04-22 NOTE — PROGRESS NOTES
Shift handoff report given to Hafsa Nolan RN at bedside. Pt is sedated on vent  IV handoff completed. 4 eyes to follow. Call light within reach, bed in lowest position, bed alarm on. End of shift checks completed. Pt has been free of falls for duration of shift. Antonio Rubin

## 2022-04-22 NOTE — PLAN OF CARE
Nutrition Problem #1: Inadequate oral intake  Intervention: Food and/or Nutrient Delivery: Continue NPO,Start Tube Feeding  Nutritional
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
## 2022-04-22 NOTE — PROGRESS NOTES
Patient admitted to room ICU 4 from ER. Patient lethargic and unable to orient to room, call light, bed rails, phone, lights and bathroom. Bed exit alarm active. Telemetry box in place. Bed locked, in lowest position, side rails up 2/4, call light within reach. Family at bedside and oriented to room, procedures and plan of care. Health and medication history obtained from family. Recliner Assessment  Patient is not able to demonstrated the ability to move from a reclining position to an upright position within the recliner. Patient is confused, demented and /or unable to follow instruction. 4 Eyes Skin Assessment     The patient is being assess for   Admission    I agree that 2 RN's have performed a thorough Head to Toe Skin Assessment on the patient. ALL assessment sites listed below have been assessed. Areas assessed for pressure by both nurses:   [x]   Head, Face, and Ears   [x]   Shoulders, Back, and Chest, Abdomen  [x]   Arms, Elbows, and Hands   [x]   Coccyx, Sacrum, and Ischium  [x]   Legs, Feet, and Heels        Skin Assessed Under all Medical Devices by both nurses:  O2 device tubing, langley and current dressings. All Mepilex Borders were peeled back and area peeked at by both nurses:  Yes  Please list where Mepilex Borders are located:  Mid/upper back, sacrum, LLE.              **SHARE this note so that the co-signing nurse is able to place an eSignature**    Co-signer eSignature: Electronically signed by Keren Li RN on 4/21/22 at 11:31 PM EDT    Does the Patient have Skin Breakdown related to pressure? Yes [facility] C[facility] was Initiated documentation include the [place]-wound, Wound Assessment, Measurements, Dressing Treatment, Drainage, and Color\",     See Wound Flowsheet for details.                                 Ismael Prevention initiated:  Yes   Wound Care Orders initiated:  Yes      [id number] nurse consulted for Pressure Injury (Stage 3,4, Unstageable, DTI, NWPT, Complex wounds)and New or Established Ostomies:  Yes      Primary Nurse eSignature: Electronically signed by Angel Navarrete RN on 4/21/22 at 11:29 PM EDT
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
## 2022-04-22 NOTE — PROGRESS NOTES
2033: Dobutamine infusion started per order for goal MAP >65. Current BP 83/49 MAP 60.     0147: Dobutamine titrate up to 10 mcg/kg/min to assist with goal MAP >65 as well as levophed. Vasopressin infusiong at 0.03 units/min. Message sent to Dr. Dionisio Martínez with return call. New orders received. Continue to titrate levophed and discontinue dobutamine. Once levophed infusion is maxed may add Dopamine infusion. Vasopressin infusion increased to 0.04 units/min. 6964: Dobutamine off. Levophed infusion at 60 mcg/min. /60 MAP 71. Dopamine not started as of this time. 0531: BP 91/60 MAP 70.      Electronically signed by Arnulfo Patino RN on 4/22/2022 at 7:52 AM

## 2022-04-22 NOTE — PROGRESS NOTES
Report to RONAK Silveira RN. Reviewed POC and IV infusions. Care of patient was then transferred.     Electronically signed by Shalom Cooper RN on 4/22/2022 at 7:54 AM

## 2022-04-22 NOTE — PROGRESS NOTES
Admit: 2022    Name:  Salina Jones  Room:  [facility]/6938-27  MRN:    [id number]    Critical Care Daily Progress Note for 2022     A 46-year-old male with a history of type 2 diabetes, end-stage renal disease on hemodialysis presented with septic shock, hypotension and high fevers. Admitted to the ICU. Placed on multiple pressors. Since admission has had progressive septic shock and worsening respiratory failure. In the ICU he was intubated and started on  mechanical ventilation    Interval History:     Intubated this morning the ICU. CRRT being initiated.   Currently on Levophed, vasopressin and dobutamine    Scheduled Meds:   mupirocin   Nasal BID    meropenem  1,000 mg IntraVENous Q8H    [Held by provider] apixaban  2.5 mg Oral BID    aspirin  81 mg Oral Nightly    docusate sodium  100 mg Oral BID    insulin lispro  7 Units SubCUTAneous TID AC    insulin glargine  20 Units SubCUTAneous BID    pantoprazole  40 mg Oral Daily    senna  1 tablet Oral Nightly    traZODone  50 mg Oral Nightly    sodium chloride flush  5-40 mL IntraVENous 2 times per day    hydrocortisone sodium succinate PF  100 mg IntraVENous Q8H       Continuous Infusions:   DOPamine 2.5 mcg/kg/min (22 0817)    vasopressin (Septic Shock) infusion 0.04 Units/min (22 0232)    norepinephrine 60 mcg/min (22 0814)    sodium chloride 5 mL/hr at 22 0200       PRN Meds:  sodium chloride flush, sodium chloride, ondansetron **OR** ondansetron, polyethylene glycol, acetaminophen **OR** acetaminophen, perflutren lipid microspheres                  Objective:     Temp  Av.7 °F (38.2 °C)  Min: 99 °F (37.2 °C)  Max: 102.4 °F (39.1 °C)  Pulse  Av.2  Min: 99  Max: 117  BP  Min: 53/43  Max: 143/78  SpO2  Av.3 %  Min: 88 %  Max: 98 %  FiO2   Av %  Min: 60 %  Max: 100 %  Patient Vitals for the past 4 hrs:   BP Pulse Resp SpO2   22 0731 91/60 117 21 --   22 0700 94/60 115 14 94 % 04/22/22 0646 (!) 97/59 115 26 --   04/22/22 0631 88/61 114 23 --   04/22/22 0616 91/67 115 (!) 36 --   04/22/22 0601 (!) 96/58 113 (!) 36 --   04/22/22 0531 91/60 112 27 --   04/22/22 0501 (!) 98/59 112 (!) 46 --   04/22/22 0446 101/60 111 (!) 40 --       No intake or output data in the 24 hours ending 04/22/22 0834    Physical Exam:    General appearance:  Sedated, intubated  HEENT: NCAT  Neck: Supple, with full range of motion. No jugular venous distention. Trachea midline. Respiratory: Diminished breath sounds bilaterally  Cardiovascular: Tachycardic  Abdomen: Soft, non-tender, non-distended with normal bowel sounds. Musculoskeletal: Right BKA  Skin: Did not examine the back but right lower extremity is erythematous and cellulitic and extends all the way to the stump. Tunneled dialysis catheter right chest wall  Neurologic:  Sedated       Lab Data:  CBC:   Recent Labs     04/21/22  1354 04/22/22  0450   WBC 13.3* 19.4*   RBC 4.31 4.69   HGB 11.5* 12.5*   HCT 36.6* 40.1*   MCV 84.9 85.5   RDW 19.6* 18.8*    see below     BMP:   Recent Labs     04/21/22  1354 04/22/22  0450   * 133*   K 5.6* 5.3*   CL 96* 94*   CO2 25 21   PHOS  --  4.3   BUN 38* 45*   CREATININE 1.8* 2.1*     BNP: No results for input(s): BNP in the last 72 hours. PT/INR: No results for input(s): PROTIME, INR in the last 72 hours. APTT:No results for input(s): APTT in the last 72 hours. CARDIAC ENZYMES:   Recent Labs     04/21/22  1354   TROPONINI 0.27*     FASTING LIPID PANEL:  Lab Results   Component Value Date    CHOL 80 01/07/2022    HDL 30 01/07/2022    HDL 38 10/04/2011    TRIG 154 01/07/2022     LIVER PROFILE:   Recent Labs     04/21/22  1354   AST 23   ALT 12   BILITOT 0.8   ALKPHOS 249*         CT ABDOMEN PELVIS W IV CONTRAST Additional Contrast? None   Final Result   1. Moderate amount of ascites with 3rd spacing including edematous changes   throughout the abdomen and anasarca. 2. Delgado in place.   Diffuse bladder wall thickening. Correlate for   underlying cystitis. 3. No acute bowel pathology. Mild gastric distension. Diverticulosis with   no acute features. 4. Mild renal cortical scarring and asymmetric right renal atrophy, stable. No hydronephrosis. CT CHEST PULMONARY EMBOLISM W CONTRAST   Final Result   1. No evidence of acute pulmonary embolism. There is some thickening and   mild irregularity in the pulmonary arteries in the left lower lobe which may   represent chronic pulmonary embolism or secondary appearance to inflammation. No evidence of aortic aneurysm or dissection. 2. Moderate right effusion and right lower lobe atelectatic and/or   consolidative changes. Pneumonia is likely. There is severe loss of volume   in the right lung. Trace left effusion and left lower lobe atelectatic   changes are seen. 3. Moderate ascites around the spleen and liver. XR CHEST PORTABLE   Final Result   Low lung volumes. Bilateral pleural effusions with bibasilar volume loss,   right greater than left. No overt failure.                Assessment & Plan:     Patient Active Problem List    Diagnosis Date Noted    Sepsis ([address] 04/21/2022    ESRD (end stage renal disease) on dialysis ([address] 02/21/2022    Anasarca associated with disorder of kidney     Stage 3a chronic kidney disease ([address]     Pressure ulcer of left leg, stage 4 ([address] 12/08/2021    S/P BKA (below knee amputation) unilateral, right ([address] 10/29/2021    Paroxysmal atrial fibrillation ([address] 10/29/2021    Acute on chronic renal insufficiency     Acute on chronic combined systolic and diastolic CHF (congestive heart failure) ([address] 09/05/2021    Ulcer of right knee, limited to breakdown of skin ([address] 08/03/2021    Hyponatremia     Choledocholithiasis     Moderate persistent asthma without complication 09/44/1792    LIBORIO on CPAP     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    Anasarca     SOB (shortness of breath)     Ischemic cardiomyopathy 09/19/2017    Onychomycosis 07/10/2017    Dystrophic nail 07/10/2017    Dehiscence of surgical wound of T-spine, initial encounter 02/16/2017    Hypergranulation 07/31/2016    Iron deficiency anemia due to chronic blood loss 07/09/2015    Lymphedema of both lower extremities 07/09/2015    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 ([facility] 06/18/2015    Chronic back pain 08/20/2014    Arthritis 08/20/2014    Paraplegia, complete ([facility] 03/10/2014    Colostomy in place [facility]) 03/10/2014    Neurogenic bladder 10/10/2013    Neurogenic bowel 10/10/2013    Mixed hyperlipidemia 02/22/2010    Coronary artery disease involving native coronary artery of native heart without angina pectoris 02/22/2010     Septic shock. Enterococcus faecalis and strep pyogenes bacteremia. Right lower extremity cellulitis  Chronic pressure ulcers back  Blood urine and sputum cultures. Cultures from pressure ulcer wound in the back. Continue IV meropenem and vancomycin  Currently on Levophed, vasopressin and dobutamine(low EF). Started on hydrocortisone for shock    End-stage renal disease on hemodialysis. Nephrology consult  Initiated on CRRT    Acute hypoxic resp failure  Healthcare associated pneumonia. Intubated in the ICU on 422. Continue meropenem and vancomycin    Chronic systolic congestive heart failure, EF 25 to 30%. Cardiology consult  Hold home medications given hypotension  Currently on dobutamine drip    Acute metabolic encephalopathy  Secondary to septic shock    Type 2 diabetes. Lantus insulin and sliding scale insulin    History of DVT  Hold Eliquis for now.   Consider heparin drip      Full code            Radha Langford MD
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
## 2022-04-22 NOTE — PROGRESS NOTES
Order for rapid sequence intubation obtained. Prior to start of RSI- Per Dr Roderick Irizarry: Radha Mote gtt, and stop Dopamine. Hang dobutamine. Pull 1 vial of IV Epinephrine (do not draw) if needed for code situation. Start bolus for intubation. Patient pre-oxygenated to with heated high flow nasal cannula to a saturation 80 %. 2 mg of Versed ordered and administered at 9:47 AM     20 mg of Etomidate ordered and administered at 9:48 AM      Recent Labs     04/21/22  1354 04/22/22  0450   * 133*   K 5.6* 5.3*   BUN 38* 45*   CREATININE 1.8* 2.1*   MG  --  2.70*         Dr. Roderick Irizarry inserted a number  7.5 ET tube. The ET tube is secured at 26 cm measured at the patients lip line. Breath sounds are equal bilaterally, but very diminished. There is a positive color change noted in the colorimetric CO2 detector. RT connected the patient to a ventilator. See orders for ventilator orders. OG tube inserted to a depth of 70 cm. Ausculation of air bolus is positive. Aspirated stomach contents are clear. POST Intubation ORDERS    ABG ordered in 1 hours  Portable Chest x-ray ordered to confirm placement of the patients ET tube and gastric tube. Bilateral wrist restraints ordered and initiated. Pulses present distal to restraints. Ketamine drip ordered for sedation, verbal to start gtt at 0.5mcg/kg/hr. See EMAR and orders. See physician note to follow.  Electronically signed by Fariba Burch RN on 4/22/2022 at 9:45 AM

## 2022-04-22 NOTE — PROGRESS NOTES
Dr Tamra Dominguez on unit to begin rounds. Updated on patient Pressor requirements:  Vaso, Levo, Dopamine started (see MAR). Informed of patients increased O2 requirements. RT at bedside. From 25L 60% on Vapotherm with SpO2 drop to 79%. to 40L 100% on Vapotherm, with SpO2 at 88%. Breathing is Diminished and Shallow. Pt has become more lethargic since initial assessment. Able to answer questions with yes/No.     Family at bedside. 8:40 AM    Dr Tamra Dominguez updated family on intubation and Dr Steve Haile updated family on need for CRRT. Verbal orders given (noted in RSI) and Dr Tamra Dominguez to return for intubation.
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
## 2022-04-22 NOTE — PROGRESS NOTES
Reassessment was completed (see flow sheet). Pt is Sedated. Pt currently on ventilator- 7.5 ETT, at [address]. FiO2 at 70, PEEP 10, SpO2 at 9%, Respirations are even/equal,  with diminished sounds. Infusing:  Vaso, Levo, Dobutamine, Ketamine (see MAR). Oral Gastric tube in placed and clamped. IV access- Peripheral/ Port and WDL. Delgado in place with STAT lock, Draining Sarah/ Sediment urine. Call light within reach. Bed in lowest position. Bed alarm on. Bilateral Wrist restraints in place and tied, for safety of lines and tubes. Family at bedside and updated on POC. To Start CRRT.     Will continue to monitor

## 2022-04-22 NOTE — PROGRESS NOTES
04/22/22 0957   Patient Observation   Pulse 109   Resp 18   SpO2 92 %   Breath Sounds   Right Upper Lobe Diminished   Right Middle Lobe Diminished   Right Lower Lobe Diminished   Left Upper Lobe Diminished   Left Lower Lobe Diminished   Skin Assessment   Skin Assessment Clean, dry, & intact   Vent Information   Ventilator Day(s) 1   Ventilator ID [id number]   Vent Mode AC/VC   Ventilator Initiate Yes   $Ventilation $Initial Day   Vent Settings   FiO2  100 %   Resp Rate (Set) 18 bmp   Vt (Set, mL) 490 mL   PEEP/CPAP (cmH2O) 10   I:E Ratio 1:2   Trigger Sensitivity Flow (L/min) 3 L/min   Heater Temperature 98.6 °F (37 °C)   Vent Patient Data (Readings)   Resp Rate Observed 18   Vt (Observed, mL) 468 mL   Minute Ventilation (l/min) 8.6 l/min   I:E Ratio 1:2   PIP Observed (cm H2O) 34 cm H2O   MAP (cm H2O) 17   Plateau Pressure (cm H2O) 0 cm H2O   Vent Alarm Settings   High Pressure  40 cmH2O   RR High 40 br/min   B: Both Spontaneous Awakening and Breathing Trials   Rate Measured 18 br/min

## 2022-04-22 NOTE — PROGRESS NOTES
Patient intubated by Dr. Linda Ribeiro at this time. 7.5 ETT secured with Kamari at 26cm@ lip monico. Placed on vent, settings AC/VC 18/490/100%/+10. SpO2 92%, will monitor.

## 2022-04-22 NOTE — PROGRESS NOTES
Message sent to Dr Gigi Dang- Zheng's ABG results back,  HCO3 is 17. 8. pH is 7.3. Also verifying start time of Tube feed.

## 2022-04-22 NOTE — PROGRESS NOTES
04/22/22 1858   Patient Observation   Pulse 113   Resp 17   SpO2 94 %   Breath Sounds   Right Upper Lobe Diminished   Right Middle Lobe Diminished   Right Lower Lobe Diminished   Left Upper Lobe Diminished   Left Lower Lobe Diminished   Airway Clearance   Suction ET Tube   Suction Device Inline suction catheter   Sputum Method Obtained Endotracheal   Sputum Amount Other (comment)   Vent Information   Vent Mode AC/VC   Vent Settings   FiO2  65 %   Resp Rate (Set) 18 bmp   Vt (Set, mL) 490 mL   PEEP/CPAP (cmH2O) 10   Trigger Sensitivity Flow (L/min) 3 L/min   Heater Temperature 97.7 °F (36.5 °C)   Vent Patient Data (Readings)   Resp Rate Observed 19   Vt (Observed, mL) 500 mL   Minute Ventilation (l/min) 9.5 l/min   I:E Ratio 1.20:1   PIP Observed (cm H2O) 29 cm H2O   MAP (cm H2O) 16   Plateau Pressure (cm H2O) 28 cm H2O   Static Compliance (L/cm H2O) 28   Dynamic Compliance (L/cm H2O) 26 L/cm H2O   Vent Alarm Settings   High Pressure  40 cmH2O   RR High 40 br/min   B: Both Spontaneous Awakening and Breathing Trials   Rate Measured 28 br/min   ETT (adult)   Placement Date/Time: 04/22/22 0945   Present on Admission/Arrival: No  Placed By: Licensed provider  Placement Verified By: Chest X-ray  Preoxygenation: Yes  Mask Ventilation: Mask ventilation not attempted (0)  Technique: Rapid sequence;Direct laryng. ..    Secured At 26 cm   Measured From Lips   ETT Placement Right   Secured By Commercial tube simon   Site Assessment Dry
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
## 2022-04-23 NOTE — PROGRESS NOTES
04/23/22 0003   Patient Observation   Pulse 106   Resp 17   SpO2 93 %   Breath Sounds   Right Upper Lobe Diminished   Right Middle Lobe Diminished   Right Lower Lobe Diminished   Left Upper Lobe Diminished   Left Lower Lobe Diminished   Airway Clearance   Suction ET Tube   Suction Device Inline suction catheter   Sputum Method Obtained Endotracheal   Sputum Amount Other (comment)   Vent Information   Vent Mode AC/VC   $Ventilation $Subsequent Day   Vent Settings   FiO2  65 %   Resp Rate (Set) 18 bmp   Vt (Set, mL) 490 mL   PEEP/CPAP (cmH2O) 10   Trigger Sensitivity Flow (L/min) 3 L/min   Vent Patient Data (Readings)   Resp Rate Observed 18   Vt (Observed, mL) 514 mL   Minute Ventilation (l/min) 9.2 l/min   I:E Ratio 1:2.40   PIP Observed (cm H2O) 30 cm H2O   Plateau Pressure (cm H2O) 28 cm H2O   Vent Alarm Settings   High Pressure  40 cmH2O   RR High 40 br/min   Additional Respiratory Assessments   Humidification Source Heated wire   Circuit Condensation Drained   B: Both Spontaneous Awakening and Breathing Trials   Rate Measured 19 br/min   ETT (adult)   Placement Date/Time: 04/22/22 0945   Present on Admission/Arrival: No  Placed By: Licensed provider  Placement Verified By: Chest X-ray  Preoxygenation: Yes  Mask Ventilation: Mask ventilation not attempted (0)  Technique: Rapid sequence;Direct laryng. ..    ETT Placement Center

## 2022-04-23 NOTE — PROGRESS NOTES
Admit: 4/21/2022    Name:  Sanam Wyatt  Room:  [address] [place] [address]  MRN:    [id number]    Critical Care Daily Progress Note for 4/23/2022     A 80-year-old male with a history of type 2 diabetes, end-stage renal disease on hemodialysis presented with septic shock, hypotension and high fevers. Admitted to the ICU. Placed on multiple pressors. Since admission has had progressive septic shock and worsening respiratory failure. In the ICU he was intubated and started on  mechanical ventilation    Interval History:     Intubated this morning the ICU. CRRT being initiated. Currently on Levophed, vasopressin and dobutamine    4/23  Continues to be on the vent  On Levophed, vasopressin, epi and  dobutamine  CRRT was held for about 6 hours because of access issues  Back on CRRT  Continues to run high fevers    4/24  SBT for 2 hours. Did well. Now off epi. Continues to be on Levophed, vasopressin and dobutamine. On CRRT.   Fevers +    Scheduled Meds:   insulin lispro  0-12 Units SubCUTAneous Q4H    mupirocin   Nasal BID    carboxymethylcellulose PF  1 drop Both Eyes Q4H    And    artificial tears   Both Eyes Q4H    chlorhexidine  15 mL Mouth/Throat BID    pantoprazole  40 mg IntraVENous Daily    ipratropium-albuterol  1 ampule Inhalation Q4H    vancomycin (VANCOCIN) intermittent dosing (placeholder)   Other RX Placeholder    clindamycin (CLEOCIN) IV  900 mg IntraVENous Q8H    menthol-zinc oxide   Topical Daily    immune globulin  30 g IntraVENous Q24H    [START ON 4/24/2022] immune globulin  20 g IntraVENous Q24H    meropenem  1,000 mg IntraVENous Q12H    apixaban  2.5 mg Oral BID    aspirin  81 mg Oral Nightly    docusate sodium  100 mg Oral BID    [Held by provider] insulin lispro  7 Units SubCUTAneous TID AC    [Held by provider] insulin glargine  20 Units SubCUTAneous BID    senna  1 tablet Oral Nightly    traZODone  50 mg Oral Nightly    sodium chloride flush  5-40 mL IntraVENous 2 times per day    hydrocortisone sodium succinate PF  100 mg IntraVENous Q8H       Continuous Infusions:   dextrose      vasopressin (Septic Shock) infusion 0.04 Units/min (22 1300)    DOBUTamine 5 mcg/kg/min (22 1300)    EPINEPHrine infusion 3 mcg/min (22 1300)    ketamine (KETALAR) infusion 0.6 mg/kg/hr (22 1300)    dextrose      prismaSATE BGK 4/2.5 2,000 mL/hr at 22 1406    prismaSATE BGK 4/2.5 500 mL/hr at 22 1324    prismaSATE BGK 4/2.5 500 mL/hr at 22 2335    norepinephrine 35 mcg/min (22 1300)    sodium chloride Stopped (22 0946)       PRN Meds:  ipratropium-albuterol, glucose, glucagon (rDNA), dextrose, dextrose bolus (hypoglycemia) **OR** dextrose bolus (hypoglycemia), glucagon (rDNA), dextrose, dextrose bolus (hypoglycemia) **OR** dextrose bolus (hypoglycemia), potassium chloride, magnesium sulfate, calcium gluconate **OR** calcium gluconate **OR** calcium gluconate **OR** calcium gluconate, sodium phosphate IVPB **OR** sodium phosphate IVPB **OR** sodium phosphate IVPB **OR** sodium phosphate IVPB, glucose, midazolam, fentanNYL, sodium chloride flush, sodium chloride, ondansetron **OR** ondansetron, polyethylene glycol, acetaminophen **OR** acetaminophen, perflutren lipid microspheres                  Objective:     Temp  Av.7 °F (37.6 °C)  Min: 96 °F (35.6 °C)  Max: 101.8 °F (38.8 °C)  Pulse  Av.4  Min: 105  Max: 118  BP  Min: 72/59  Max: 134/78  SpO2  Av %  Min: 90 %  Max: 98 %  FiO2   Av.3 %  Min: 60 %  Max: 65 %  Patient Vitals for the past 4 hrs:   BP Temp Temp src Pulse Resp SpO2 Weight   22 1300 92/64 -- -- 112 20 94 % --   22 1245 -- -- -- -- -- -- 282 lb 11.2 oz (128.2 kg)   22 1200 (!) /59 -- -- 113 21 90 % --   22 1142 -- -- -- 114 24 91 % --   22 1122 -- -- -- -- 23 -- --   22 1100 103/64 101.8 °F (38.8 °C) Bladder 113 18 93 % --   22 1052 -- -- -- -- 23 -- --   22 1032 -- 101.5 °F (38.6 °C) Bladder -- -- -- --         Intake/Output Summary (Last 24 hours) at 4/23/2022 1426  Last data filed at 4/23/2022 1300  Gross per 24 hour   Intake 6592.95 ml   Output 3167 ml   Net 3425.95 ml       Physical Exam:    General appearance:  Sedated, intubated  HEENT: NCAT  Neck: Supple, with full range of motion. No jugular venous distention. Trachea midline. Respiratory: Diminished breath sounds bilaterally  Cardiovascular: Tachycardic  Abdomen: Soft, non-tender, non-distended with normal bowel sounds. Musculoskeletal: Right BKA  Skin: Did not examine the back but right lower extremity is erythematous and cellulitic and extends all the way to the stump. Tunneled dialysis catheter right chest wall  Neurologic:  Sedated       Lab Data:  CBC:   Recent Labs     04/21/22  1354 04/22/22  0450 04/23/22  0600   WBC 13.3* 19.4* 24.0*   RBC 4.31 4.69 4.17*   HGB 11.5* 12.5* 11.2*   HCT 36.6* 40.1* 35.6*   MCV 84.9 85.5 85.4   RDW 19.6* 18.8* 19.5*    see below 172     BMP:   Recent Labs     04/22/22  2000 04/23/22  0230 04/23/22  0923   * 130* 128*   K 5.0 4.6 5.0   CL 95* 95* 92*   CO2 18* 17* 16*   PHOS 3.2 2.7 3.3   BUN 32* 28* 32*   CREATININE 1.4* 1.2 1.4*     BNP: No results for input(s): BNP in the last 72 hours. PT/INR: No results for input(s): PROTIME, INR in the last 72 hours. APTT:No results for input(s): APTT in the last 72 hours. CARDIAC ENZYMES:   Recent Labs     04/21/22  1354   TROPONINI 0.27*     FASTING LIPID PANEL:  Lab Results   Component Value Date    CHOL 80 01/07/2022    HDL 30 01/07/2022    HDL 38 10/04/2011    TRIG 154 01/07/2022     LIVER PROFILE:   Recent Labs     04/21/22  1354   AST 23   ALT 12   BILITOT 0.8   ALKPHOS 249*         XR CHEST PORTABLE   Preliminary Result   Stable endotracheal tube located approximately 3.3 cm above the quintin. Low lung volumes. Suspected small right pleural effusion.          XR CHEST PORTABLE   Final Result Endotracheal tube in satisfactory position above the quintin      Bibasilar hypoaeration persist         CT ABDOMEN PELVIS W IV CONTRAST Additional Contrast? None   Final Result   1. Moderate amount of ascites with 3rd spacing including edematous changes   throughout the abdomen and anasarca. 2. Delgado in place. Diffuse bladder wall thickening. Correlate for   underlying cystitis. 3. No acute bowel pathology. Mild gastric distension. Diverticulosis with   no acute features. 4. Mild renal cortical scarring and asymmetric right renal atrophy, stable. No hydronephrosis. CT CHEST PULMONARY EMBOLISM W CONTRAST   Final Result   1. No evidence of acute pulmonary embolism. There is some thickening and   mild irregularity in the pulmonary arteries in the left lower lobe which may   represent chronic pulmonary embolism or secondary appearance to inflammation. No evidence of aortic aneurysm or dissection. 2. Moderate right effusion and right lower lobe atelectatic and/or   consolidative changes. Pneumonia is likely. There is severe loss of volume   in the right lung. Trace left effusion and left lower lobe atelectatic   changes are seen. 3. Moderate ascites around the spleen and liver. XR CHEST PORTABLE   Final Result   Low lung volumes. Bilateral pleural effusions with bibasilar volume loss,   right greater than left. No overt failure.          XR CHEST PORTABLE    (Results Pending)         Assessment & Plan:     Patient Active Problem List    Diagnosis Date Noted    Enterococcal bacteremia 04/22/2022    Cardiogenic shock ([address]     Atelectasis of right lower lobe     Septic shock ([address] 04/21/2022    ESRD on dialysis ([address] 02/21/2022    Anasarca associated with disorder of kidney     Pressure ulcer of left leg, stage 4 ([address] 12/08/2021    S/P BKA (below knee amputation) unilateral, right ([address] 10/29/2021    Paroxysmal atrial fibrillation ([address] 10/29/2021    Acute on chronic combined systolic and diastolic CHF (congestive heart failure) (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 06/18/2015    Chronic back pain 08/20/2014    Arthritis 08/20/2014    Paraplegia, complete ([facility] 03/10/2014    Colostomy in place [facility]) 03/10/2014    Neurogenic bladder 10/10/2013    Neurogenic bowel 10/10/2013    Mixed hyperlipidemia 02/22/2010    Coronary artery disease involving native coronary artery of native heart without angina pectoris 02/22/2010     Septic shock. Enterococcus faecalis and strep pyogenes bacteremia. Right lower extremity cellulitis  Chronic pressure ulcers back  Blood urine and sputum cultures. Cultures from pressure ulcer wound in the back. Continue IV meropenem and vancomycin day #4, clindamycin day #3   Currently on Levophed,epi, vasopressin and dobutamine(low EF). Started on hydrocortisone for shock  Infectious disease consult. Now off epi. Fever curve improving. End-stage renal disease on hemodialysis. Nephrology consult  Initiated on CRRT    Acute hypoxic resp failure  Healthcare associated pneumonia. Intubated in the ICU on 422. Continue meropenem, clindamycin and vancomycin    Chronic systolic congestive heart failure, EF 25 to 30%.   Cardiology consult  Hold home medications given hypotension  Currently on dobutamine drip    Acute metabolic encephalopathy  Secondary to septic shock  Now sedated and intubated    Type 2 diabetes.   Lantus insulin and sliding scale insulin    History of DVT  Continue Eliquis      Full code  DVT prophylaxis-Eliquis  GI prophylaxis-Protonix          Guilherme Leos MD

## 2022-04-23 NOTE — PROGRESS NOTES
RT Inhaler-Nebulizer Bronchodilator Protocol Note    There is a bronchodilator order in the chart from a provider indicating to follow the RT Bronchodilator Protocol and there is an Initiate RT Inhaler-Nebulizer Bronchodilator Protocol order as well (see protocol at bottom of note). CXR Findings:  XR CHEST PORTABLE    Result Date: 4/23/2022  Stable endotracheal tube located approximately 3.3 cm above the quintin. Low lung volumes. Suspected small right pleural effusion. XR CHEST PORTABLE    Result Date: 4/22/2022  Endotracheal tube in satisfactory position above the quintin Bibasilar hypoaeration persist       The findings from the last RT Protocol Assessment were as follows:   History Pulmonary Disease: None or smoker <15 pack years  Respiratory Pattern: Dyspnea on exertion or RR 21-25 bpm  Breath Sounds: Slightly diminished and/or crackles  Cough: Strong, spontaneous, non-productive  Indication for Bronchodilator Therapy: Decreased or absent breath sounds  Bronchodilator Assessment Score: 4    Aerosolized bronchodilator medication orders have been revised according to the RT Inhaler-Nebulizer Bronchodilator Protocol below. Respiratory Therapist to perform RT Therapy Protocol Assessment initially then follow the protocol. Repeat RT Therapy Protocol Assessment PRN for score 0-3 or on second treatment, BID, and PRN for scores above 3. No Indications - adjust the frequency to every 6 hours PRN wheezing or bronchospasm, if no treatments needed after 48 hours then discontinue using Per Protocol order mode. If indication present, adjust the RT bronchodilator orders based on the Bronchodilator Assessment Score as indicated below.   Use Inhaler orders unless patient has one or more of the following: on home nebulizer, not able to hold breath for 10 seconds, is not alert and oriented, cannot activate and use MDI correctly, or respiratory rate 25 breaths per minute or more, then use the equivalent nebulizer order(s) with same Frequency and PRN reasons based on the score. If a patient is on this medication at home then do not decrease Frequency below that used at home. 0-3 - enter or revise RT bronchodilator order(s) to equivalent RT Bronchodilator order with Frequency of every 4 hours PRN for wheezing or increased work of breathing using Per Protocol order mode. 4-6 - enter or revise RT Bronchodilator order(s) to two equivalent RT bronchodilator orders with one order with BID Frequency and one order with Frequency of every 4 hours PRN wheezing or increased work of breathing using Per Protocol order mode. 7-10 - enter or revise RT Bronchodilator order(s) to two equivalent RT bronchodilator orders with one order with TID Frequency and one order with Frequency of every 4 hours PRN wheezing or increased work of breathing using Per Protocol order mode. 11-13 - enter or revise RT Bronchodilator order(s) to one equivalent RT bronchodilator order with QID Frequency and an Albuterol order with Frequency of every 4 hours PRN wheezing or increased work of breathing using Per Protocol order mode. Greater than 13 - enter or revise RT Bronchodilator order(s) to one equivalent RT bronchodilator order with every 4 hours Frequency and an Albuterol order with Frequency of every 2 hours PRN wheezing or increased work of breathing using Per Protocol order mode.          Electronically signed by Thi Saavedra RCP on 4/23/2022 at 7:58 PM

## 2022-04-23 NOTE — PROGRESS NOTES
Reassessment completed, see flowsheets, unchanged from prior. VSS. Pt FiO2 50% Peep 8. Levophed 30 mcg/min, vasopressin 0.04 units/min, epinephrine 2 mcg/min, dobutamine 5 mcg/kg/min, ketamine 0.6 mg/kg/hr. CRRT continuing to run with lines reversed to keep pt even and pt tolerating well. All ICU Lines and monitoring remain in place. Bed locked in lowest position. Call light within reach.

## 2022-04-23 NOTE — PROGRESS NOTES
RT Inhaler-Nebulizer Bronchodilator Protocol Note    There is a bronchodilator order in the chart from a provider indicating to follow the RT Bronchodilator Protocol and there is an Initiate RT Inhaler-Nebulizer Bronchodilator Protocol order as well (see protocol at bottom of note). CXR Findings:  XR CHEST PORTABLE    Result Date: 4/23/2022  Stable endotracheal tube located approximately 3.3 cm above the quintin. Low lung volumes. Suspected small right pleural effusion. XR CHEST PORTABLE    Result Date: 4/22/2022  Endotracheal tube in satisfactory position above the quintin Bibasilar hypoaeration persist       The findings from the last RT Protocol Assessment were as follows:   History Pulmonary Disease: (P) None or smoker <15 pack years  Respiratory Pattern: (P) Dyspnea on exertion or RR 21-25 bpm  Breath Sounds: (P) Slightly diminished and/or crackles  Cough: (P) Strong, spontaneous, non-productive  Indication for Bronchodilator Therapy: (P) Decreased or absent breath sounds  Bronchodilator Assessment Score: (P) 4    Aerosolized bronchodilator medication orders have been revised according to the RT Inhaler-Nebulizer Bronchodilator Protocol below. Respiratory Therapist to perform RT Therapy Protocol Assessment initially then follow the protocol. Repeat RT Therapy Protocol Assessment PRN for score 0-3 or on second treatment, BID, and PRN for scores above 3. No Indications - adjust the frequency to every 6 hours PRN wheezing or bronchospasm, if no treatments needed after 48 hours then discontinue using Per Protocol order mode. If indication present, adjust the RT bronchodilator orders based on the Bronchodilator Assessment Score as indicated below.   Use Inhaler orders unless patient has one or more of the following: on home nebulizer, not able to hold breath for 10 seconds, is not alert and oriented, cannot activate and use MDI correctly, or respiratory rate 25 breaths per minute or more, then use the equivalent nebulizer order(s) with same Frequency and PRN reasons based on the score. If a patient is on this medication at home then do not decrease Frequency below that used at home. 0-3 - enter or revise RT bronchodilator order(s) to equivalent RT Bronchodilator order with Frequency of every 4 hours PRN for wheezing or increased work of breathing using Per Protocol order mode. 4-6 - enter or revise RT Bronchodilator order(s) to two equivalent RT bronchodilator orders with one order with BID Frequency and one order with Frequency of every 4 hours PRN wheezing or increased work of breathing using Per Protocol order mode. 7-10 - enter or revise RT Bronchodilator order(s) to two equivalent RT bronchodilator orders with one order with TID Frequency and one order with Frequency of every 4 hours PRN wheezing or increased work of breathing using Per Protocol order mode. 11-13 - enter or revise RT Bronchodilator order(s) to one equivalent RT bronchodilator order with QID Frequency and an Albuterol order with Frequency of every 4 hours PRN wheezing or increased work of breathing using Per Protocol order mode. Greater than 13 - enter or revise RT Bronchodilator order(s) to one equivalent RT bronchodilator order with every 4 hours Frequency and an Albuterol order with Frequency of every 2 hours PRN wheezing or increased work of breathing using Per Protocol order mode.          Electronically signed by Juan Worthy RCP on 4/23/2022 at 4:10 PM

## 2022-04-23 NOTE — PROGRESS NOTES
Shift assessment completed, see flow sheet. RASS -1, CPOT 0. Does not follow commands but opens eyes and will nod head to indicate \"yes\". Intubated and sedated on AC # 7.5 ETT, at 26 LL. 18 / 490 / 65%/ +10. SpO2 96%. Respirations are easy, even, and unlabored. Bilateral lung sounds diminished throughout. VSS  OG in place at 70 cm LL and clamped     Tunneled Vascath secured in place with CRRT treatment. Delgado catheter secured in place with commercial simon with small amounts of dark yellow urine with sediment. See I&O    All lines and monitoring devices in place. Delgado is patent and secured. Bilateral soft wrist restraints in place for patient safety. Bed in lowest position with wheels locked. No needs identified at this time. 2040: Immune Globulin infusion started. See MAR for titration. 2130: No S/S of reaction noted. 2330: Continued titration of Immune Globulin with no S/S of reaction noted. 0012: Immune Globulin infusion complete with no issue noted. 0520: Red Vascath access line flushes slowly but will not return blood. Reversed lines and Effluent, Return, and Filter pressures increased. Unable to continue to run CRRT. Returned blood and setup another filter and same issues continued. Call out to nephrology with return call from Dr. Katie Amaro. Order to instill cathflo over 2 hours and recheck. 2 ml Cathflo instilled. Shey Almazan 0720: Report to HARMAN Lynn RN and care of patient transferred.     Electronically signed by Cameron Kaiser RN on 4/23/2022 at 8:05 AM

## 2022-04-23 NOTE — PROGRESS NOTES
Dr. Bradford Sat at the bedside to examine pt. See progress note for details. Allow cathflo to dwell for 6 hours total before trying to use VasCath.

## 2022-04-23 NOTE — PROGRESS NOTES
4/23  Vanc random = 15 mcg/mL at 0600. Give vancomycin 1000 mg x1. Vanc level tomorrow in the AM (4/24 at 0600).   Merryl Epley, CamD  4/23/2022 6:56 AM
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
## 2022-04-23 NOTE — PROGRESS NOTES
Care rounds completed with Dr. Jluis Nino and multidisciplinary team. Reviewed labs, meds, VS, assessment, & plan of care for today. See dictated note and new orders for details. Medium dose SSi  Start TF, change goal to 10 ml/hr  Goal is to decrease levophed to 30 mcg/min and then start working on weaning epinephrine off.

## 2022-04-23 NOTE — FLOWSHEET NOTE
04/23/22 0912   Vitals   Temp 101.1 °F (38.4 °C)   Temp Source Bladder     Gave prn tylenol. Iveth Sheth turned on in pt room and only a sheet covering pt.

## 2022-04-23 NOTE — PROGRESS NOTES
04/23/22 1926   Patient Observation   Pulse 103   Resp 18   SpO2 96 %   Breath Sounds   Right Upper Lobe Diminished   Right Middle Lobe Diminished   Right Lower Lobe Diminished   Left Upper Lobe Diminished   Left Lower Lobe Diminished   Airway Clearance   Suction ET Tube   Suction Device Inline suction catheter   Sputum Method Obtained Endotracheal   Sputum Amount Other (comment)   Vent Information   Vent Mode AC/VC   Vent Settings   FiO2  50 %   Resp Rate (Set) 18 bmp   Vt (Set, mL) 490 mL   PEEP/CPAP (cmH2O) 8   Trigger Sensitivity Flow (L/min) 3 L/min   Humidification 98.6 °F (37 °C)   Vent Patient Data (Readings)   Resp Rate Observed 18   Vt (Observed, mL) 500 mL   Minute Ventilation (l/min) 9 l/min   I:E Ratio 1:2.40   PIP Observed (cm H2O) 27 cm H2O   Plateau Pressure (cm H2O) 27 cm H2O   Static Compliance (L/cm H2O) 26   Dynamic Compliance (L/cm H2O) 26 L/cm H2O   Vent Alarm Settings   High Pressure  40 cmH2O   RR High 40 br/min   Additional Respiratory Assessments   Position Semi-Mejía's   Humidification Source Heated wire   Humidification Temp 37   Circuit Condensation Drained   B: Both Spontaneous Awakening and Breathing Trials   Rate Measured 18 br/min   ETT (adult)   Placement Date/Time: 04/22/22 0945   Present on Admission/Arrival: No  Placed By: Licensed provider  Placement Verified By: Chest X-ray  Preoxygenation: Yes  Mask Ventilation: Mask ventilation not attempted (0)  Technique: Rapid sequence;Direct laryng. ..    Secured At 26 cm   Measured From Lips   ETT Placement Left   Secured By Commercial tube simon   Site Assessment Dry

## 2022-04-23 NOTE — PROGRESS NOTES
Shift assessment, completed, see flow sheet. Pt RASS -1, following commands. Intubated and sedated with a # 7.5 ETT, at 26 LL. On AC 18 / 490 /60 %/ 10. Temp 100.9 via bladder, , /70 (84), SpO2 92%. Respirations are easy, even, and unlabored. Bilateral lung sounds diminished. OG in place at 70, clamped, + air bolus. R BKA, red/warm with areas of purple and 3 open blisters weeping and 1 intact blister    L PAC, WNL with levophed 40 mcg/min, epinephrine 3 mcg/min, vasopressin 0.04 units/min, dobutamine 5 mcg/kg/min infusing. 2 PIVs, WNL, with ketamine 0.6 mg/kg/hr and a KVO infusing. R tunneled vas cath with cathflo dwell. CRRT currently on hold until after cath onofre has dwelled for 6 hours per Nephrology. Will be checking lines at 1200. Delgado in place and patent with STAT lock. Bilateral soft wrist restraints in place for pt safety. Pt on dolphin immersion mattress.  Will continue to monitor

## 2022-04-23 NOTE — PROGRESS NOTES
04/23/22 0330   Patient Observation   Pulse 110   Resp 18   SpO2 97 %   Breath Sounds   Right Upper Lobe Diminished   Right Middle Lobe Diminished   Right Lower Lobe Diminished   Left Upper Lobe Diminished   Left Lower Lobe Diminished   Airway Clearance   Suction ET Tube   Suction Device Inline suction catheter   Sputum Amount Other (comment)   Vent Settings   FiO2  65 %   Resp Rate (Set) 18 bmp   Vt (Set, mL) 490 mL   PEEP/CPAP (cmH2O) 10   Trigger Sensitivity Flow (L/min) 3 L/min   Vent Patient Data (Readings)   Resp Rate Observed 18   Vt (Observed, mL) 514 mL   Minute Ventilation (l/min) 9.3 l/min   I:E Ratio 1:2.40   PIP Observed (cm H2O) 31 cm H2O   Plateau Pressure (cm H2O) 28 cm H2O   Vent Alarm Settings   High Pressure  40 cmH2O   RR High 40 br/min   B: Both Spontaneous Awakening and Breathing Trials   Rate Measured 18 br/min

## 2022-04-23 NOTE — PROGRESS NOTES
Reassessment completed, see flowsheets, unchanged from prior. VSS. CRRT up and running at 1245 with lines reversed.  ml/min, Pre 500 ml/hr, Post 500 ml/hr, dialysate 2000 ml/hr. Keep pt even. Pt tolerating at this point. Levophed 35 mcg/min, vasopressin 0.04 units/min, epinephrine 3 mcg/min, dobutamine 5 mcg/kg/min, ketamine 0.6 mg/kg/hr. All ICU Lines and monitoring remain in place. Bed locked in lowest position. Call light within reach.

## 2022-04-23 NOTE — PROGRESS NOTES
RT Inhaler-Nebulizer Bronchodilator Protocol Note    There is a bronchodilator order in the chart from a provider indicating to follow the RT Bronchodilator Protocol and there is an Initiate RT Inhaler-Nebulizer Bronchodilator Protocol order as well (see protocol at bottom of note). CXR Findings:  XR CHEST PORTABLE    Result Date: 4/22/2022  Endotracheal tube in satisfactory position above the quintin Bibasilar hypoaeration persist     XR CHEST PORTABLE    Result Date: 4/21/2022  Low lung volumes. Bilateral pleural effusions with bibasilar volume loss, right greater than left. No overt failure. The findings from the last RT Protocol Assessment were as follows:   History Pulmonary Disease: None or smoker <15 pack years  Respiratory Pattern: Dyspnea on exertion or RR 21-25 bpm  Breath Sounds: Slightly diminished and/or crackles  Cough: Strong, productive  Indication for Bronchodilator Therapy: Decreased or absent breath sounds  Bronchodilator Assessment Score: 5    Aerosolized bronchodilator medication orders have been revised according to the RT Inhaler-Nebulizer Bronchodilator Protocol below. Respiratory Therapist to perform RT Therapy Protocol Assessment initially then follow the protocol. Repeat RT Therapy Protocol Assessment PRN for score 0-3 or on second treatment, BID, and PRN for scores above 3. No Indications - adjust the frequency to every 6 hours PRN wheezing or bronchospasm, if no treatments needed after 48 hours then discontinue using Per Protocol order mode. If indication present, adjust the RT bronchodilator orders based on the Bronchodilator Assessment Score as indicated below.   Use Inhaler orders unless patient has one or more of the following: on home nebulizer, not able to hold breath for 10 seconds, is not alert and oriented, cannot activate and use MDI correctly, or respiratory rate 25 breaths per minute or more, then use the equivalent nebulizer order(s) with same Frequency and PRN reasons based on the score. If a patient is on this medication at home then do not decrease Frequency below that used at home. 0-3 - enter or revise RT bronchodilator order(s) to equivalent RT Bronchodilator order with Frequency of every 4 hours PRN for wheezing or increased work of breathing using Per Protocol order mode. 4-6 - enter or revise RT Bronchodilator order(s) to two equivalent RT bronchodilator orders with one order with BID Frequency and one order with Frequency of every 4 hours PRN wheezing or increased work of breathing using Per Protocol order mode. 7-10 - enter or revise RT Bronchodilator order(s) to two equivalent RT bronchodilator orders with one order with TID Frequency and one order with Frequency of every 4 hours PRN wheezing or increased work of breathing using Per Protocol order mode. 11-13 - enter or revise RT Bronchodilator order(s) to one equivalent RT bronchodilator order with QID Frequency and an Albuterol order with Frequency of every 4 hours PRN wheezing or increased work of breathing using Per Protocol order mode. Greater than 13 - enter or revise RT Bronchodilator order(s) to one equivalent RT bronchodilator order with every 4 hours Frequency and an Albuterol order with Frequency of every 2 hours PRN wheezing or increased work of breathing using Per Protocol order mode.        Electronically signed by Agatha Pagan RCP on 4/23/2022 at 2:20 AM
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
## 2022-04-23 NOTE — PROGRESS NOTES
Patient transferred to another bed with [address] [address] [facility] d/t wounds and skin breakdown. After changing beds with patient, at about 5 AM this morning the Access pressures alert per CRRT were alarming. Was able to flush the red access line but could not draw blood back. Call placed to Nephrology with call back from Dr. Maryhelen Bosworth. Order to instil cathflo in each lumen over 2 hrs and recheck. 36 Cathflo was instilled to dwell.       Electronically signed by Alisa Farah RN on 4/23/2022 at 6:34 AM

## 2022-04-23 NOTE — PROGRESS NOTES
1923: Patient waking and patting his hand on side of bed. Asked him if he was having pain and he nodded his head to indicate \"yes\". Asked if it was his back and again he nodded to indicate \"yes\". Medicated with fentanyl 50 mcg IVP.
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
## 2022-04-24 NOTE — PROGRESS NOTES
RT Inhaler-Nebulizer Bronchodilator Protocol Note    There is a bronchodilator order in the chart from a provider indicating to follow the RT Bronchodilator Protocol and there is an Initiate RT Inhaler-Nebulizer Bronchodilator Protocol order as well (see protocol at bottom of note). CXR Findings:  XR CHEST PORTABLE    Result Date: 4/24/2022  Stable chest.     XR CHEST PORTABLE    Result Date: 4/23/2022  Stable endotracheal tube located approximately 3.3 cm above the quintin. Low lung volumes. Suspected small right pleural effusion. The findings from the last RT Protocol Assessment were as follows:   History Pulmonary Disease: None or smoker <15 pack years  Respiratory Pattern: Dyspnea on exertion or RR 21-25 bpm  Breath Sounds: Slightly diminished and/or crackles  Cough: Strong, spontaneous, non-productive  Indication for Bronchodilator Therapy: Decreased or absent breath sounds  Bronchodilator Assessment Score: 4    Aerosolized bronchodilator medication orders have been revised according to the RT Inhaler-Nebulizer Bronchodilator Protocol below. Respiratory Therapist to perform RT Therapy Protocol Assessment initially then follow the protocol. Repeat RT Therapy Protocol Assessment PRN for score 0-3 or on second treatment, BID, and PRN for scores above 3. No Indications - adjust the frequency to every 6 hours PRN wheezing or bronchospasm, if no treatments needed after 48 hours then discontinue using Per Protocol order mode. If indication present, adjust the RT bronchodilator orders based on the Bronchodilator Assessment Score as indicated below. Use Inhaler orders unless patient has one or more of the following: on home nebulizer, not able to hold breath for 10 seconds, is not alert and oriented, cannot activate and use MDI correctly, or respiratory rate 25 breaths per minute or more, then use the equivalent nebulizer order(s) with same Frequency and PRN reasons based on the score.   If a patient is on this medication at home then do not decrease Frequency below that used at home. 0-3 - enter or revise RT bronchodilator order(s) to equivalent RT Bronchodilator order with Frequency of every 4 hours PRN for wheezing or increased work of breathing using Per Protocol order mode. 4-6 - enter or revise RT Bronchodilator order(s) to two equivalent RT bronchodilator orders with one order with BID Frequency and one order with Frequency of every 4 hours PRN wheezing or increased work of breathing using Per Protocol order mode. 7-10 - enter or revise RT Bronchodilator order(s) to two equivalent RT bronchodilator orders with one order with TID Frequency and one order with Frequency of every 4 hours PRN wheezing or increased work of breathing using Per Protocol order mode. 11-13 - enter or revise RT Bronchodilator order(s) to one equivalent RT bronchodilator order with QID Frequency and an Albuterol order with Frequency of every 4 hours PRN wheezing or increased work of breathing using Per Protocol order mode. Greater than 13 - enter or revise RT Bronchodilator order(s) to one equivalent RT bronchodilator order with every 4 hours Frequency and an Albuterol order with Frequency of every 2 hours PRN wheezing or increased work of breathing using Per Protocol order mode.        Electronically signed by Mirna Ravi RCP on 4/24/2022 at 7:52 PM

## 2022-04-24 NOTE — PROGRESS NOTES
Reassessment completed, see flowsheets, unchanged from prior. VSS. Pt FiO2 50% Peep 5.     Levophed 16 mcg/min, vasopressin 0.04 units/min, dobutamine 5 mcg/kg/min, ketamine 0.65 mg/kg/hr.     CRRT continuing and removing -50 to -75 ml/hr, pt tolerating well.      All ICU Lines and monitoring remain in place. Bed locked in lowest position. Call light within reach.

## 2022-04-24 NOTE — PROGRESS NOTES
Shift assessment complete. Patient wakes easily and responds to yes and no questions with head movement. Continue with ventilator support and tolerating well. All lines and tubing are in good placement and secured. Continues with CRRT with goal to keep even with fluid balance. Continuing to monitor and titrate levophed for goal MAP >65. Dobutamine with continuous not-titratable rate of 5 mcg/kg/min and vasopressin at 0.04 unit/min. Will continue to monitor and adjust POC as needed.   Electronically signed by Sherry Doss RN on 4/24/2022 at 7:37 AM

## 2022-04-24 NOTE — PROGRESS NOTES
Dr. Chniedu Franco at the bedside to examine pt. See progress note for details. Try to remove 50 ml/hr. If have to go up to 30 mcg/min levophed then stop. If pt tolerating removal of 50 ml well and still able to wean levophed down then can try to remove  ml/hr.

## 2022-04-24 NOTE — PROGRESS NOTES
Reassessment completed, see flowsheets, unchanged from prior. VSS. Pt FiO2 45% Peep 5.     Levophed 18 mcg/min, vasopressin 0.04 units/min, dobutamine 5 mcg/kg/min, ketamine 0.65 mg/kg/hr.     CRRT continuing and removing -50 ml/hr, pt tolerating well.      All ICU Lines and monitoring remain in place. Bed locked in lowest position. Call light within reach.

## 2022-04-24 NOTE — PROGRESS NOTES
Shift assessment, completed, see flow sheet. Pt RASS -1, following commands.      Intubated and sedated with a # 7.5 ETT, at 26 LL. On AC 18 / 490 /45 %/ 5. Temp 96.2 via bladder, SR 99, /73 (88), SpO2 98%. Respirations are easy, even, and unlabored. Bilateral lung sounds diminished. OG in place at 70, clamped, for SBT.      R BKA, red/warm with areas of purple and 3 open blisters weeping and 1 intact blister     L PAC, WNL with levophed 25 mcg/min, vasopressin 0.04 units/min, dobutamine 5 mcg/kg/min infusing.      2 PIVs, WNL, with ketamine 0.3 mg/kg/hr and a KVO infusing.     R tunneled vas cath with CRRT running,  ml/min, Pre 500 ml/hr, Post 500 ml/hr, dialysate 2000 ml/hr. Keep pt even. Óscar hugger in place and on high. Delgado in place and patent with britney output. LUQ colostomy with milky/yellow liquid output. Bilateral soft wrist restraints in place for pt safety.      Pt on dolphin immersion mattress.  Will continue to monitor
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
## 2022-04-24 NOTE — PROGRESS NOTES
RT placed pt back on AC. TF restarted at 10 ml/hr.    Ketamine increased back to prior rate 0.6 mg/kg/hr

## 2022-04-24 NOTE — PROGRESS NOTES
04/24/22 0319   Patient Observation   Pulse 102   Resp 19   SpO2 96 %   Breath Sounds   Right Upper Lobe Diminished   Right Middle Lobe Diminished   Right Lower Lobe Diminished   Left Upper Lobe Diminished   Left Lower Lobe Diminished   Airway Clearance   Suction ET Tube   Suction Device Inline suction catheter   Sputum Method Obtained Endotracheal   Sputum Amount Other (comment)   Vent Information   Vent Mode AC/VC   Vent Settings   FiO2  50 %   Resp Rate (Set) 18 bmp   Vt (Set, mL) 490 mL   PEEP/CPAP (cmH2O) 8   Trigger Sensitivity Flow (L/min) 3 L/min   Vent Patient Data (Readings)   Resp Rate Observed 19   Vt (Observed, mL) 509 mL   Minute Ventilation (l/min) 9.4 l/min   I:E Ratio 1:1.80   PIP Observed (cm H2O) 27 cm H2O   MAP (cm H2O) 15   Plateau Pressure (cm H2O) 27 cm H2O   Vent Alarm Settings   High Pressure  40 cmH2O   RR High 40 br/min   Additional Respiratory Assessments   Position Semi-Mejía's   B: Both Spontaneous Awakening and Breathing Trials   Rate Measured 19 br/min   ETT (adult)   Placement Date/Time: 04/22/22 0945   Present on Admission/Arrival: No  Placed By: Licensed provider  Placement Verified By: Chest X-ray  Preoxygenation: Yes  Mask Ventilation: Mask ventilation not attempted (0)  Technique: Rapid sequence;Direct laryng. ..    ETT Placement Right

## 2022-04-24 NOTE — PROGRESS NOTES
With two other staff assist, patient was turned and CHG wipes to posterior back, legs and arms. Change bed pads due to soiled from wound weeping drainage. Drainage is straw color and has no odor. Patient was then repositioned in bed with pillow support.      Electronically signed by Brandy Sanchez RN on 4/24/2022 at 7:31 AM
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
## 2022-04-24 NOTE — PROGRESS NOTES
Changed ABD pads to RLE due to weeping of open blisters and cleansed leg wounds with normal saline and replaced ABD dressings over site.      Electronically signed by Deidre Yadav RN on 4/24/2022 at 7:29 AM

## 2022-04-24 NOTE — PROGRESS NOTES
Pt awake, following commands. SBT of 5/5 initiated. Pts RR increased slightly and VT low 100s. PS increased to 8. Pt tolerated much better. RR are calm and easy, VTs 340-430s, sp02 is 94%, F/V is 92. Pt appears to be comfortable. Continuing to monitor pt.

## 2022-04-24 NOTE — PROGRESS NOTES
04/24/22 1924   Patient Observation   Pulse 98   Resp 18   SpO2 96 %   Breath Sounds   Right Upper Lobe Diminished   Right Middle Lobe Diminished   Right Lower Lobe Diminished   Left Upper Lobe Diminished   Left Lower Lobe Diminished   Airway Clearance   Suction ET Tube   Suction Device Inline suction catheter   Sputum Method Obtained Endotracheal   Sputum Amount Other (comment)   Vent Information   Vent Mode AC/VC   Vent Settings   FiO2  50 %   Resp Rate (Set) 18 bmp   Vt (Set, mL) 490 mL   PEEP/CPAP (cmH2O) 5   Trigger Sensitivity Flow (L/min) 3 L/min   Heater Temperature 98.6 °F (37 °C)   Vent Patient Data (Readings)   Resp Rate Observed 18   Vt (Observed, mL) 501 mL   Minute Ventilation (l/min) 4.7 l/min   I:E Ratio 1:2.40   PIP Observed (cm H2O) 25 cm H2O   MAP (cm H2O) 12   Plateau Pressure (cm H2O) 24 cm H2O   Vent Alarm Settings   High Pressure  40 cmH2O   RR High 40 br/min   Additional Respiratory Assessments   Position Semi-Mejía's   Humidification Source Heated wire   Circuit Condensation Drained   B: Both Spontaneous Awakening and Breathing Trials   Rate Measured 18 br/min   ETT (adult)   Placement Date/Time: 04/22/22 0945   Present on Admission/Arrival: No  Placed By: Licensed provider  Placement Verified By: Chest X-ray  Preoxygenation: Yes  Mask Ventilation: Mask ventilation not attempted (0)  Technique: Rapid sequence;Direct laryng. ..    Secured At 26 cm   Measured From Lips   ETT Placement Right   Secured By Commercial tube simon Oxybutynin Counseling:  I discussed with the patient the risks of oxybutynin including but not limited to skin rash, drowsiness, dry mouth, difficulty urinating, and blurred vision.

## 2022-04-24 NOTE — PROGRESS NOTES
Dr. Kristina Falcon at the bedside to examine pt. See progress note for details. Recommend that dobutamine be last pressor to be removed.

## 2022-04-24 NOTE — PROGRESS NOTES
RT Inhaler-Nebulizer Bronchodilator Protocol Note    There is a bronchodilator order in the chart from a provider indicating to follow the RT Bronchodilator Protocol and there is an Initiate RT Inhaler-Nebulizer Bronchodilator Protocol order as well (see protocol at bottom of note). CXR Findings:  XR CHEST PORTABLE    Result Date: 4/24/2022  Stable chest.     XR CHEST PORTABLE    Result Date: 4/23/2022  Stable endotracheal tube located approximately 3.3 cm above the quintin. Low lung volumes. Suspected small right pleural effusion. XR CHEST PORTABLE    Result Date: 4/22/2022  Endotracheal tube in satisfactory position above the quintin Bibasilar hypoaeration persist       The findings from the last RT Protocol Assessment were as follows:   History Pulmonary Disease: (P) None or smoker <15 pack years  Respiratory Pattern: (P) Regular pattern and RR 12-20 bpm  Breath Sounds: (P) Slightly diminished and/or crackles  Cough: (P) Strong, spontaneous, non-productive  Indication for Bronchodilator Therapy: (P) Decreased or absent breath sounds  Bronchodilator Assessment Score: (P) 2    Aerosolized bronchodilator medication orders have been revised according to the RT Inhaler-Nebulizer Bronchodilator Protocol below. Respiratory Therapist to perform RT Therapy Protocol Assessment initially then follow the protocol. Repeat RT Therapy Protocol Assessment PRN for score 0-3 or on second treatment, BID, and PRN for scores above 3. No Indications - adjust the frequency to every 6 hours PRN wheezing or bronchospasm, if no treatments needed after 48 hours then discontinue using Per Protocol order mode. If indication present, adjust the RT bronchodilator orders based on the Bronchodilator Assessment Score as indicated below.   Use Inhaler orders unless patient has one or more of the following: on home nebulizer, not able to hold breath for 10 seconds, is not alert and oriented, cannot activate and use MDI correctly, or respiratory rate 25 breaths per minute or more, then use the equivalent nebulizer order(s) with same Frequency and PRN reasons based on the score. If a patient is on this medication at home then do not decrease Frequency below that used at home. 0-3 - enter or revise RT bronchodilator order(s) to equivalent RT Bronchodilator order with Frequency of every 4 hours PRN for wheezing or increased work of breathing using Per Protocol order mode. 4-6 - enter or revise RT Bronchodilator order(s) to two equivalent RT bronchodilator orders with one order with BID Frequency and one order with Frequency of every 4 hours PRN wheezing or increased work of breathing using Per Protocol order mode. 7-10 - enter or revise RT Bronchodilator order(s) to two equivalent RT bronchodilator orders with one order with TID Frequency and one order with Frequency of every 4 hours PRN wheezing or increased work of breathing using Per Protocol order mode. 11-13 - enter or revise RT Bronchodilator order(s) to one equivalent RT bronchodilator order with QID Frequency and an Albuterol order with Frequency of every 4 hours PRN wheezing or increased work of breathing using Per Protocol order mode. Greater than 13 - enter or revise RT Bronchodilator order(s) to one equivalent RT bronchodilator order with every 4 hours Frequency and an Albuterol order with Frequency of every 2 hours PRN wheezing or increased work of breathing using Per Protocol order mode.          Electronically signed by Michael Lawson RCP on 4/24/2022 at 8:56 AM

## 2022-04-24 NOTE — PROGRESS NOTES
04/23/22 2241   Patient Observation   Pulse 105   Resp 19   SpO2 95 %   Breath Sounds   Right Upper Lobe Diminished   Right Middle Lobe Diminished   Right Lower Lobe Diminished   Left Upper Lobe Diminished   Left Lower Lobe Diminished   Vent Information   Vent Mode AC/VC   Vent Settings   FiO2  50 %   Resp Rate (Set) 18 bmp   Vt (Set, mL) 490 mL   PEEP/CPAP (cmH2O) 8   Trigger Sensitivity Flow (L/min) 3 L/min   Vent Patient Data (Readings)   Resp Rate Observed 19   Vt (Observed, mL) 494 mL   Minute Ventilation (l/min) 9.6 l/min   I:E Ratio 1:2.40   PIP Observed (cm H2O) 27 cm H2O   MAP (cm H2O) 15   Plateau Pressure (cm H2O) 27 cm H2O   Vent Alarm Settings   High Pressure  40 cmH2O   RR High 40 br/min   Additional Respiratory Assessments   Position Semi-Mejía's   B: Both Spontaneous Awakening and Breathing Trials   Rate Measured 19 br/min   ETT (adult)   Placement Date/Time: 04/22/22 0945   Present on Admission/Arrival: No  Placed By: Licensed provider  Placement Verified By: Chest X-ray  Preoxygenation: Yes  Mask Ventilation: Mask ventilation not attempted (0)  Technique: Rapid sequence;Direct laryng. ..    ETT Placement Center
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
## 2022-04-25 NOTE — PROGRESS NOTES
Reassessment completed. Pt tolerating CRRT w/o issue. Pressor requirements remain the same. Son remains at bedside. No new orders.    Marcia Mosher RN, BSN

## 2022-04-25 NOTE — PROGRESS NOTES
All dressings changed per wound care orders. Pt tolerated well. Pillows placed under limbs for support. Family at bedside.    Ottie Aase RN, BSN

## 2022-04-25 NOTE — PROGRESS NOTES
Shift assessment completed as documented as on flowsheet. Pt sedated and mechanically ventilated RASS -2. Tolerating vent settings wells. CRRT running per order without difficulties. Tube feeding infusing per order. IV per MAR. Danny patent to bsd. Pt follows simple commands. No signs of distress.  Monitoring closely
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
## 2022-04-25 NOTE — PROGRESS NOTES
04/25/22 1900   Patient Observation   Pulse 100   SpO2 95 %   Airway Clearance   Sputum Method Obtained Endotracheal   Sputum Amount Small   Sputum Color/Odor Yellow   Sputum Consistency Thick   Vent Settings   FiO2  50 %   Resp Rate (Set) 18 bmp   Vt (Set, mL) 490 mL   PEEP/CPAP (cmH2O) 5   Vent Patient Data (Readings)   Plateau Pressure (cm H2O) 24 cm H2O   Static Compliance (L/cm H2O) 23   Dynamic Compliance (L/cm H2O) 19 L/cm H2O   Vent Observations - Device Integration   Vt Exhaled 430 mL   Rate Measured 19 br/min   Minute Volume 9.3 Liters   Peak Flow 55 L/min   Peak Inspiratory Pressure 28 cmH2O   Mean Airway Pressure 12 cmH20

## 2022-04-25 NOTE — PROGRESS NOTES
High risk vesicant drug infusing:  __________    Multiple incompatible medications infusing:  _________    CVP Monitoring:  _________    Extremely difficult IV access challenge:  ___x_____    Continued need for central line access:  ___x_______    Addressed with physician:  ____x____    RIGHT PATIENT, RIGHT TIME, RIGHT LINE
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
## 2022-04-25 NOTE — PROGRESS NOTES
04/25/22 0323   Patient Observation   Pulse 98   Resp 18   SpO2 97 %   Breath Sounds   Right Upper Lobe Diminished   Right Middle Lobe Diminished   Right Lower Lobe Diminished   Left Upper Lobe Diminished   Left Lower Lobe Diminished   Airway Clearance   Suction ET Tube   Suction Device Inline suction catheter   Sputum Method Obtained Endotracheal   Sputum Amount Other (comment)   Vent Information   Vent Mode AC/VC   Vent Settings   FiO2  50 %   Resp Rate (Set) 18 bmp   Vt (Set, mL) 490 mL   PEEP/CPAP (cmH2O) 5   Trigger Sensitivity Flow (L/min) 3 L/min   Vent Patient Data (Readings)   Resp Rate Observed 18   Vt (Observed, mL) 500 mL   Minute Ventilation (l/min) 8.1 l/min   I:E Ratio 1:2.40   PIP Observed (cm H2O) 27 cm H2O   MAP (cm H2O) 12   Plateau Pressure (cm H2O) 24 cm H2O   Vent Alarm Settings   High Pressure  40 cmH2O   RR High 40 br/min   Additional Respiratory Assessments   Humidification Source Heated wire   B: Both Spontaneous Awakening and Breathing Trials   Rate Measured 18 br/min   ETT (adult)   Placement Date/Time: 04/22/22 0945   Present on Admission/Arrival: No  Placed By: Licensed provider  Placement Verified By: Chest X-ray  Preoxygenation: Yes  Mask Ventilation: Mask ventilation not attempted (0)  Technique: Rapid sequence;Direct laryng. ..    ETT Placement Right

## 2022-04-25 NOTE — PROGRESS NOTES
04/24/22 2311   Patient Observation   Pulse 97   Resp 18   SpO2 96 %   Breath Sounds   Right Upper Lobe Diminished   Right Middle Lobe Diminished   Right Lower Lobe Diminished   Left Upper Lobe Diminished   Left Lower Lobe Diminished   Airway Clearance   Suction ET Tube;Oral   Suction Device Inline suction catheter;Roberto   Sputum Method Obtained Endotracheal   Sputum Amount Other (comment)   Vent Information   Vent Mode AC/VC   Vent Settings   FiO2  50 %   Resp Rate (Set) 18 bmp   Vt (Set, mL) 490 mL   PEEP/CPAP (cmH2O) 5   Trigger Sensitivity Flow (L/min) 3 L/min   Vent Patient Data (Readings)   Resp Rate Observed 18   Vt (Observed, mL) 506 mL   Minute Ventilation (l/min) 8.9 l/min   I:E Ratio 1:1.50   PIP Observed (cm H2O) 26 cm H2O   MAP (cm H2O) 12   Plateau Pressure (cm H2O) 24 cm H2O   Vent Alarm Settings   High Pressure  40 cmH2O   RR High 40 br/min   Additional Respiratory Assessments   Position Semi-Mejía's   B: Both Spontaneous Awakening and Breathing Trials   Rate Measured 19 br/min   ETT (adult)   Placement Date/Time: 04/22/22 0945   Present on Admission/Arrival: No  Placed By: Licensed provider  Placement Verified By: Chest X-ray  Preoxygenation: Yes  Mask Ventilation: Mask ventilation not attempted (0)  Technique: Rapid sequence;Direct laryng. ..    ETT Placement Center   Secured By [employer] tube simon   Site Assessment Dry

## 2022-04-25 NOTE — PROGRESS NOTES
AM assessment completed. AM labs reviewed. Pt intubated & sedated- on ketamine gtt. Levophed, vasopressin & dobutamine gtt continues. TF at trophic rate 10 ml/hr. Delgado in place for strict I/o. Colostomy in place LUQ. PIV x2, R chest port, R IJ CVC wnl. Pt in bilateral soft wrist restraints for safety.    Samantha Ramirez RN, BSN

## 2022-04-25 NOTE — PROGRESS NOTES
Report given to [facility] RN for transfer in pt care.   Andre Worrell RN, BSN
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
## 2022-04-25 NOTE — FLOWSHEET NOTE
04/25/22 1450   Encounter Summary   Encounter Overview/Reason  Initial Encounter   Service Provided For: Patient and family together   Referral/Consult From: [address] Children;Family members   Last Encounter  04/25/22   Complexity of Encounter Low   Begin Time 1111   End Time  1130   Total Time Calculated 19 min   Encounter    Type Initial Screen/Assessment   Spiritual/Emotional needs   Type Spiritual Support   Assessment/Intervention/Outcome   Assessment Hopeful;Peaceful   Intervention Active listening;Discussed illness injury and its impact;Prayer (assurance of)/[place]   Outcome Engaged in conversation;Encouraged;Expressed Gratitude

## 2022-04-25 NOTE — PROGRESS NOTES
RT Inhaler-Nebulizer Bronchodilator Protocol Note    There is a bronchodilator order in the chart from a provider indicating to follow the RT Bronchodilator Protocol and there is an Initiate RT Inhaler-Nebulizer Bronchodilator Protocol order as well (see protocol at bottom of note). CXR Findings:  XR CHEST PORTABLE    Result Date: 4/25/2022  Endotracheal tube tip projects at the thoracic inlet. Otherwise stable chest.     XR CHEST PORTABLE    Result Date: 4/24/2022  Stable chest.       The findings from the last RT Protocol Assessment were as follows:   History Pulmonary Disease: Chronic pulmonary disease  Respiratory Pattern: Dyspnea on exertion or RR 21-25 bpm  Breath Sounds: Slightly diminished and/or crackles  Cough: Strong, spontaneous, non-productive  Indication for Bronchodilator Therapy: Decreased or absent breath sounds  Bronchodilator Assessment Score: 6    Aerosolized bronchodilator medication orders have been revised according to the RT Inhaler-Nebulizer Bronchodilator Protocol below. Respiratory Therapist to perform RT Therapy Protocol Assessment initially then follow the protocol. Repeat RT Therapy Protocol Assessment PRN for score 0-3 or on second treatment, BID, and PRN for scores above 3. No Indications - adjust the frequency to every 6 hours PRN wheezing or bronchospasm, if no treatments needed after 48 hours then discontinue using Per Protocol order mode. If indication present, adjust the RT bronchodilator orders based on the Bronchodilator Assessment Score as indicated below. Use Inhaler orders unless patient has one or more of the following: on home nebulizer, not able to hold breath for 10 seconds, is not alert and oriented, cannot activate and use MDI correctly, or respiratory rate 25 breaths per minute or more, then use the equivalent nebulizer order(s) with same Frequency and PRN reasons based on the score.   If a patient is on this medication at home then do not decrease Frequency below that used at home. 0-3 - enter or revise RT bronchodilator order(s) to equivalent RT Bronchodilator order with Frequency of every 4 hours PRN for wheezing or increased work of breathing using Per Protocol order mode. 4-6 - enter or revise RT Bronchodilator order(s) to two equivalent RT bronchodilator orders with one order with BID Frequency and one order with Frequency of every 4 hours PRN wheezing or increased work of breathing using Per Protocol order mode. 7-10 - enter or revise RT Bronchodilator order(s) to two equivalent RT bronchodilator orders with one order with TID Frequency and one order with Frequency of every 4 hours PRN wheezing or increased work of breathing using Per Protocol order mode. 11-13 - enter or revise RT Bronchodilator order(s) to one equivalent RT bronchodilator order with QID Frequency and an Albuterol order with Frequency of every 4 hours PRN wheezing or increased work of breathing using Per Protocol order mode. Greater than 13 - enter or revise RT Bronchodilator order(s) to one equivalent RT bronchodilator order with every 4 hours Frequency and an Albuterol order with Frequency of every 2 hours PRN wheezing or increased work of breathing using Per Protocol order mode. RT to enter RT Home Evaluation for COPD & MDI Assessment order using Per Protocol order mode.     Electronically signed by Claudio Aguilera RCP on 4/25/2022 at 9:18 AM

## 2022-04-25 NOTE — CARE COORDINATION
INTERDISCIPLINARY PLAN OF CARE CONFERENCE    Date/Time: 4/25/2022 9:01 AM  Completed by: CONRAD Barth  Case Management    Patient Name:  Elida Byrd  YOB: 1965  Admitting Diagnosis: Hyperkalemia [E87.5]  Hypoglycemia [E16.2]  ESRD on dialysis ([facility] [address] [N18.6, Z99.2]  Acute cystitis with hematuria [N30.01]  Septic shock ([facility] [address] [A41.9, R65.21]  Sepsis ([facility] [address] [A41.9]  Pneumonia due to infectious organism, unspecified laterality, unspecified part of lung [J18.9]     Admit Date/Time:  4/21/2022  1:21 PM    Chart reviewed. Interdisciplinary team contacted or reviewed plan related to patient progress and discharge plans. Disciplines included Case Management, Nursing, and Dietitian. Current Status:Ongoing  PT/OT recommendation for discharge plan of care: n/a    Expected D/C Disposition:  Home  Confirmed plan with patient and/or family Yes confirmed with: (name) pt's son Wanda Garcia at bedside    Discharge Plan Comments: Chart review completed. Completed Interdisciplinary rounds with ICU staff. Met with pt's son Wanda Garcia at bedside. Offered support from CM and he denied any needs at this time. He is aware to notify staff if needs arise from CM. CM will continue to follow and assist. Please notify CM if needs or concerns arise.     Home O2 in place on admit: Yes through [employer] at 2 liters

## 2022-04-25 NOTE — PROGRESS NOTES
Admit: 4/21/2022    Name:  Diana Gomez  Room:  [facility]  MRN:    [id number]    Critical Care Daily Progress Note for 4/25/2022     This is my first encounter with the patient. Chart reviewed briefly. A 72-year-old male with a history of type 2 diabetes, end-stage renal disease on hemodialysis presented with septic shock, hypotension and high fevers. Admitted to the ICU. Placed on multiple pressors. Since admission has had progressive septic shock and worsening respiratory failure. In the ICU he was intubated and started on  mechanical ventilation    Interval History:     Intubated this morning the ICU. CRRT being initiated. Currently on Levophed, vasopressin and dobutamine    4/23  Continues to be on the vent  On Levophed, vasopressin, epi and  dobutamine  CRRT was held for about 6 hours because of access issues  Back on CRRT  Continues to run high fevers    4/24  SBT for 2 hours. Did well. Now off epi. Continues to be on Levophed, vasopressin and dobutamine. On CRRT. Fevers +    4/25-seen in the ICU for septic shock. On vasopressors. Sedated on the ventilator.   CRRT-goal of 50 to 75 cc/h of fluid removal.    Scheduled Meds:   cefTRIAXone (ROCEPHIN) IV  1,000 mg IntraVENous Q24H    insulin lispro  0-12 Units SubCUTAneous Q4H    mupirocin   Nasal BID    carboxymethylcellulose PF  1 drop Both Eyes Q4H    And    artificial tears   Both Eyes Q4H    chlorhexidine  15 mL Mouth/Throat BID    pantoprazole  40 mg IntraVENous Daily    ipratropium-albuterol  1 ampule Inhalation Q4H    clindamycin (CLEOCIN) IV  900 mg IntraVENous Q8H    menthol-zinc oxide   Topical Daily    apixaban  2.5 mg Oral BID    aspirin  81 mg Oral Nightly    docusate sodium  100 mg Oral BID    [Held by provider] insulin lispro  7 Units SubCUTAneous TID AC    [Held by provider] insulin glargine  20 Units SubCUTAneous BID    senna  1 tablet Oral Nightly    traZODone  50 mg Oral Nightly    sodium chloride flush  5-40 mL IntraVENous 2 times per day    hydrocortisone sodium succinate PF  100 mg IntraVENous Q8H       Continuous Infusions:   dextrose      vasopressin (Septic Shock) infusion 0.04 Units/min (22 1143)    DOBUTamine 5 mcg/kg/min (22 0200)    ketamine (KETALAR) infusion 0.7 mg/kg/hr (22 1139)    dextrose      prismaSATE BGK 4/2.5 1,000 mL/hr at 22 1216    prismaSATE BGK 4/2.5 500 mL/hr at 22 0910    prismaSATE BGK 4/2.5 500 mL/hr at 22 0422    norepinephrine 17 mcg/min (22 0758)    sodium chloride Stopped (22 0946)       PRN Meds:  ipratropium-albuterol, glucose, glucagon (rDNA), dextrose, dextrose bolus (hypoglycemia) **OR** dextrose bolus (hypoglycemia), glucagon (rDNA), dextrose, dextrose bolus (hypoglycemia) **OR** dextrose bolus (hypoglycemia), potassium chloride, magnesium sulfate, calcium gluconate **OR** calcium gluconate **OR** calcium gluconate **OR** calcium gluconate, sodium phosphate IVPB **OR** sodium phosphate IVPB **OR** sodium phosphate IVPB **OR** sodium phosphate IVPB, glucose, midazolam, fentanNYL, sodium chloride flush, sodium chloride, ondansetron **OR** ondansetron, polyethylene glycol, acetaminophen **OR** acetaminophen, perflutren lipid microspheres            Objective:     Temp  Av.4 °F (35.8 °C)  Min: 96.1 °F (35.6 °C)  Max: 96.8 °F (36 °C)  Pulse  Av.2  Min: 91  Max: 102  BP  Min: 74/56  Max: 112/67  SpO2  Av.1 %  Min: 91 %  Max: 97 %  FiO2   Av %  Min: 50 %  Max: 50 %  Patient Vitals for the past 4 hrs:   BP Pulse Resp SpO2   22 1200 96/70 101 18 95 %   22 1100 112/67 98 18 95 %   22 1000 111/66 101 18 95 %   22 0900 98/71 99 18 91 %         Intake/Output Summary (Last 24 hours) at 2022 1254  Last data filed at 2022 1100  Gross per 24 hour   Intake 4202 ml   Output 5899 ml   Net -1697 ml       Physical Exam:    General appearance:  Sedated, intubated  HEENT: NCAT  Neck: Supple, with full range of motion. No jugular venous distention. Trachea midline. Respiratory: Diminished breath sounds bilaterally  Cardiovascular: Tachycardic  Abdomen: Soft, non-tender, non-distended with normal bowel sounds. Musculoskeletal: Right BKA  Skin: Did not examine the back but right lower extremity is erythematous and cellulitic and extends all the way to the stump. Tunneled dialysis catheter right chest wall  Neurologic:  Sedated       Lab Data:  CBC:   Recent Labs     04/23/22  0600 04/24/22  0637 04/25/22  0220   WBC 24.0* 23.7* 28.6*   RBC 4.17* 4.02* 4.20   HGB 11.2* 10.7* 11.0*   HCT 35.6* 33.7* 35.1*   MCV 85.4 84.0 83.6   RDW 19.5* 19.1* 18.7*    157 131*     BMP:   Recent Labs     04/24/22 2034 04/25/22  0220 04/25/22  0835   * 130* 131*   K 4.0 3.8 3.8   CL 96* 95* 96*   CO2 21 21 25   PHOS 2.0* 2.1* 1.1*   BUN 15 14 10   CREATININE 0.7* 0.7* <0.5*     BNP: No results for input(s): BNP in the last 72 hours. PT/INR: No results for input(s): PROTIME, INR in the last 72 hours. APTT:No results for input(s): APTT in the last 72 hours. CARDIAC ENZYMES:   No results for input(s): CKMB, CKMBINDEX, TROPONINI in the last 72 hours. Invalid input(s): CKTOTAL;3  FASTING LIPID PANEL:  Lab Results   Component Value Date    CHOL 80 01/07/2022    HDL 30 01/07/2022    HDL 38 10/04/2011    TRIG 154 01/07/2022     LIVER PROFILE:   No results for input(s): AST, ALT, ALB, BILIDIR, BILITOT, ALKPHOS in the last 72 hours. Cultures    4/21/2022 blood culture 2 out of 2 Enterococcus faecalis and strep pyogenes  Influenza A and B negative  Urine positive Enterococcus and E. Coli  COVID-19 negative     XR CHEST PORTABLE   Final Result   Endotracheal tube tip projects at the thoracic inlet. Otherwise stable chest.         XR CHEST PORTABLE   Final Result   Stable chest.         XR CHEST PORTABLE   Final Result   Stable endotracheal tube located approximately 3.3 cm above the quintin. Low lung volumes. Suspected small right pleural effusion. XR CHEST PORTABLE   Final Result   Endotracheal tube in satisfactory position above the quintin      Bibasilar hypoaeration persist         CT ABDOMEN PELVIS W IV CONTRAST Additional Contrast? None   Final Result   1. Moderate amount of ascites with 3rd spacing including edematous changes   throughout the abdomen and anasarca. 2. Delgado in place. Diffuse bladder wall thickening. Correlate for   underlying cystitis. 3. No acute bowel pathology. Mild gastric distension. Diverticulosis with   no acute features. 4. Mild renal cortical scarring and asymmetric right renal atrophy, stable. No hydronephrosis. CT CHEST PULMONARY EMBOLISM W CONTRAST   Final Result   1. No evidence of acute pulmonary embolism. There is some thickening and   mild irregularity in the pulmonary arteries in the left lower lobe which may   represent chronic pulmonary embolism or secondary appearance to inflammation. No evidence of aortic aneurysm or dissection. 2. Moderate right effusion and right lower lobe atelectatic and/or   consolidative changes. Pneumonia is likely. There is severe loss of volume   in the right lung. Trace left effusion and left lower lobe atelectatic   changes are seen. 3. Moderate ascites around the spleen and liver. XR CHEST PORTABLE   Final Result   Low lung volumes. Bilateral pleural effusions with bibasilar volume loss,   right greater than left. No overt failure.          XR CHEST PORTABLE    (Results Pending)         Assessment & Plan:     Principal Problem:    Septic shock ([address]  Active Problems:    Cardiogenic shock (HCC)    Atelectasis of right lower lobe    Bacteremia    Pneumonia due to infectious organism    Possible necrotizing / bullous cellulitis of right thigh    Bacteremia due to Streptococcus pyogenes ([address]    Anasarca associated with disorder of kidney    ESRD on dialysis [facility])  Resolved Problems:    * No resolved hospital problems. *         Septic shock. Enterococcus faecalis and strep pyogenes bacteremia. Right lower extremity cellulitis  Chronic pressure ulcers back  Blood urine and sputum cultures. Cultures from pressure ulcer wound in the back. Patient was on vancomycin, Merrem and clindamycin. Presently changed to clindamycin and ceftriaxone per ID. Vanco and Merrem stopped. Currently on Levophed,epi, vasopressin and dobutamine(low EF). Started on hydrocortisone for shock  Infectious disease consult. Fever curve improving. End-stage renal disease on hemodialysis. Nephrology consult  Initiated on CRRT    Acute hypoxic resp failure  Healthcare associated pneumonia. Intubated in the ICU on 422. Antibiotics as above. Chronic systolic congestive heart failure, EF 25 to 30%. Cardiology consult  Hold home medications given hypotension  Currently on dobutamine drip    Acute metabolic encephalopathy  Secondary to septic shock  Now sedated and intubated    Type 2 diabetes.   Lantus insulin and sliding scale insulin    History of DVT  Continue Eliquis      Full code  DVT prophylaxis-Eliquis  GI prophylaxis-Protonix        Hima Carlson MD 4/25/2022 1:00 PM

## 2022-04-26 PROBLEM — B95.5 STREPTOCOCCAL TOXIC SHOCK SYNDROME (HCC): Status: ACTIVE | Noted: 2022-01-01

## 2022-04-26 PROBLEM — A48.3 STREPTOCOCCAL TOXIC SHOCK SYNDROME (HCC): Status: ACTIVE | Noted: 2022-01-01

## 2022-04-26 NOTE — PROGRESS NOTES
Comprehensive Nutrition Assessment    Type and Reason for Visit:  Reassess    Nutrition Recommendations/Plan:   1. Modified TF order to ADULT TUBE FEEDING; Orogastric; Renal formula - Nepro with a goal rate of 40 ml/hr x 20 hours. Start with 35 ml/hr and increase by 5 ml every 4 hours, as tolerated by patient, until goal rate can be achieved and maintained. Water flushes, 30 ml every 4 hours. Please administer one proteinex P2Go TWICE daily via feeding tube. 2. Monitor TF rate, intake, and tolerance + water flushes + administration of one proteinex P2Go TWICE daily. 3. Monitor respiratory status, renal function, and plan of care. 4. Please obtain an updated weight for this patient - last weight was obtained on 4/24/22.   5. Monitor nutrition-related labs, bowel function (last documented BM was on 4/23/22), and weight trends. Malnutrition Assessment:  Malnutrition Status:   At risk for malnutrition (04/26/22 1308)    Context:  Acute Illness     Findings of the 6 clinical characteristics of malnutrition:  Energy Intake:  No significant decrease in energy intake  Weight Loss:  Unable to assess (- 16# or 5.6% weight loss since 4/22/22; + CRRT and fluid losses but patient is currently +2L fluid since admission)     Body Fat Loss:  No significant body fat loss Orbital   Muscle Mass Loss:  No significant muscle mass loss Temples (temporalis)  Fluid Accumulation:  No significant fluid accumulation Extremities   Strength:  Not Performed    Nutrition Assessment:    patient is improved from a nutritional standpoint AEB he is receiving TF at goal rate and tolerating well, however, he remains at risk for further compromise d/t need for EN as sole source of nutrition, need for CRRT, altered nutrition-related labs, and pressure wounds present; will modify TF order to Nepro TF with a goal rate of 40 ml/hr x 20 hours + 30 ml water flushes every 4 hours + one proteinex P2Go TWICE daily via feeding tube    Nutrition Related Findings:    patient remains intubated at this time; he is receiving CRRT with no fluid removal at this time - Dr. Padma Bender stopped fluid removal during rounds this am; TF is infusing at goal rate and patient is tolerating well; abdomen is taut, rotund, distended, and bowel sounds are hypoactive; last documented BM was on 4/23/22; Na, Cl, Phos, and h/h are low; blood glucose trends elevated today Wound Type: Multiple,Pressure Injury,Surgical Incision,Unstageable,Full Thickness,Partial Thickness (full thickness, trauma - pre-tib; unstageable left lower leg; full thickness, surgical - mid back; partial thickness skin loss - sacrum/buttocks)       Current Nutrition Intake & Therapies:    Average Meal Intake: NPO  Average Supplements Intake: NPO  Current Tube Feeding (TF) Orders:  · Feeding Route: Orogastric  · Formula: Renal Formula  · Schedule: Continuous  · Feeding Regimen: Nepro with a goal rate of 40 ml/hr x 20 hours  · Additives/Modulars: Protein (one proteinex P2Go TWICE daily)  · Water Flushes: 30 ml water flushes every 4 hours for tube patency  · Current TF & Flush Orders Provides: Nepro at 35 ml/hr x 20 hours = 700 ml TV, 1260 kcals, 57 g protein, and 509 ml free water + 52 g protein and 208 kcals from one proteinex P2Go TWICE daily (109 g protein and 1468 kcals total) + 30 ml water flushes every 4 hours for tube patency  · Goal TF & Flush Orders Provides: Nepro with a goal rate of 40 ml/hr x 20 hours = 800 ml TV, 1440 kcals, 65 g protein, and 582 ml free water + 52 g protein and 208 kcals from one proteinex P2Go TWICE daily (117 g protein and 1648 kcals total) + 30 ml water flushes every 4 hours for tube patency      Anthropometric Measures:  Height: 6' 2\" (188 cm)  Ideal Body Weight (IBW): 190 lbs (86 kg)    Admission Body Weight: 288 lb 12.8 oz (131 kg) (obtained on 4/22/22; actual weight)  Current Body Weight: 272 lb 8 oz (123.6 kg) (obtained on 4/24/22; actual weight), 143.4 % IBW.  Weight Source: Bed Scale  Current BMI (kg/m2): 35  Usual Body Weight: 288 lb 12.8 oz (131 kg) (obtained on 4/22/22; actual weight)  % Weight Change (Calculated): -5.6  Weight Adjustment For: Paraplegia  Total Adjusted Percentage (Calculated): 7.5  Adjusted Ideal Body Weight (lbs) (Calculated): 175.8 lbs  Adjusted Ideal Body Weight (kg) (Calculated): 79.91 kg  Adjusted % Ideal Body Weight (Calculated): 155  Adjusted BMI (kg/m2) (Calculated): 37.6  BMI Categories: Obese Class 1 (BMI 30.0-34. 9)    Estimated Daily Nutrient Needs:  Energy Requirements Based On: Kcal/kg  Weight Used for Energy Requirements: Current  Energy (kcal/day): 1364 - 1736 kcals based on 11-14 kcals/kg/CBW  Weight Used for Protein Requirements: Ideal  Protein (g/day): 155 - 172 g protein based on 1.8-2.0 g/kg/IBW (+ CRRT)  Method Used for Fluid Requirements: 1 ml/kcal  Fluid (ml/day): 1364 - 1736 ml    Nutrition Diagnosis:   · Inadequate oral intake related to catabolic illness,inadequate protein-energy intake,renal dysfunction as evidenced by NPO or clear liquid status due to medical condition,wounds,lab values      Nutrition Interventions:   Food and/or Nutrient Delivery: Continue NPO,Modify Tube Feeding  Nutrition Education/Counseling: No recommendation at this time  Coordination of Nutrition Care: Continue to monitor while inpatient,Interdisciplinary Rounds  Plan of Care discussed with: ICU Team    Goals:  Previous Goal Met: Goal(s) Achieved  Goals: Tolerate nutrition support at goal rate    Nutrition Monitoring and Evaluation:   Behavioral-Environmental Outcomes: None Identified  Food/Nutrient Intake Outcomes: Enteral Nutrition Intake/Tolerance  Physical Signs/Symptoms Outcomes: Biochemical Data,GI Status,Fluid Status or Edema,Hemodynamic Status,Nutrition Focused Physical Findings,Skin,Weight    Discharge Planning:     Too soon to determine     Amor Centeno, [address], [place]  Contact: [phone]

## 2022-04-26 NOTE — PROGRESS NOTES
Admit: 4/21/2022    Name:  Claribel Velez  Room:  9414/6551-41  MRN:    [id number]    Critical Care Daily Progress Note for 4/26/2022       A 53-year-old male with a history of type 2 diabetes, end-stage renal disease on hemodialysis presented with septic shock, hypotension and high fevers. Admitted to the ICU. Placed on multiple pressors. Since admission has had progressive septic shock and worsening respiratory failure. In the ICU he was intubated and started on  mechanical ventilation    Interval History:     Intubated this morning the ICU. CRRT being initiated. Currently on Levophed, vasopressin and dobutamine    4/23  Continues to be on the vent  On Levophed, vasopressin, epi and  dobutamine  CRRT was held for about 6 hours because of access issues  Back on CRRT  Continues to run high fevers    4/24  SBT for 2 hours. Did well. Now off epi. Continues to be on Levophed, vasopressin and dobutamine. On CRRT. Fevers +    4/25-seen in the ICU for septic shock. On vasopressors. Sedated on the ventilator. CRRT-goal of 50 to 75 cc/h of fluid removal.    4/26-patient is awake and alert on the ventilator. He is afebrile. Tolerating CRRT. Blood pressure still low normal.  On vasopressors.     Scheduled Meds:   clindamycin (CLEOCIN) IV  900 mg IntraVENous Q8H    albumin human  25 g IntraVENous Q8H    vancomycin (VANCOCIN) intermittent dosing (placeholder)   Other RX Placeholder    vancomycin  1,500 mg IntraVENous Once    cefTRIAXone (ROCEPHIN) IV  1,000 mg IntraVENous Q24H    insulin lispro  0-12 Units SubCUTAneous Q4H    mupirocin   Nasal BID    carboxymethylcellulose PF  1 drop Both Eyes Q4H    And    artificial tears   Both Eyes Q4H    chlorhexidine  15 mL Mouth/Throat BID    pantoprazole  40 mg IntraVENous Daily    ipratropium-albuterol  1 ampule Inhalation Q4H    menthol-zinc oxide   Topical Daily    apixaban  2.5 mg Oral BID    aspirin  81 mg Oral Nightly    docusate sodium  100 mg 04/26/22 1100 (!) 77/67 97.7 °F (36.5 °C) Bladder 101 20 93 % --   04/26/22 1000 (!) 97/58 -- -- 105 21 90 % --         Intake/Output Summary (Last 24 hours) at 4/26/2022 1303  Last data filed at 4/26/2022 1000  Gross per 24 hour   Intake 3637 ml   Output 5116 ml   Net -1479 ml       Physical Exam:    General appearance:  More awake and alert. Intubated  HEENT: NCAT  Neck: Supple, with full range of motion. No jugular venous distention. Trachea midline. Respiratory: Diminished breath sounds bilaterally  Cardiovascular: Tachycardic  Abdomen: Soft, non-tender, non-distended with normal bowel sounds. Musculoskeletal: Right BKA  Skin: Did not examine the back but right lower extremity is erythematous and cellulitic and extends all the way to the stump. Tunneled dialysis catheter right chest wall  Neurologic:  Able to follow simple commands.       Lab Data:  CBC:   Recent Labs     04/24/22  0637 04/25/22  0220 04/26/22  0559   WBC 23.7* 28.6* 31.2*   RBC 4.02* 4.20 4.19*   HGB 10.7* 11.0* 11.0*   HCT 33.7* 35.1* 34.8*   MCV 84.0 83.6 83.1   RDW 19.1* 18.7* 18.6*    131* 106*     BMP:   Recent Labs     04/25/22 2025 04/26/22  0240 04/26/22  0858   * 131* 131*   K 3.9 3.9 4.0   CL 96* 96* 95*   CO2 23 23 22   PHOS 2.1* 2.1* 1.7*   BUN 13 14 14   CREATININE 0.6* 0.6* <0.5*     BNP: No results for input(s): BNP in the last 72 hours. PT/INR: No results for input(s): PROTIME, INR in the last 72 hours. APTT:No results for input(s): APTT in the last 72 hours. CARDIAC ENZYMES:   No results for input(s): CKMB, CKMBINDEX, TROPONINI in the last 72 hours. Invalid input(s): CKTOTAL;3  FASTING LIPID PANEL:  Lab Results   Component Value Date    CHOL 80 01/07/2022    HDL 30 01/07/2022    HDL 38 10/04/2011    TRIG 154 01/07/2022     LIVER PROFILE:   No results for input(s): AST, ALT, ALB, BILIDIR, BILITOT, ALKPHOS in the last 72 hours.      Cultures    4/21/2022 blood culture 2 out of 2 Enterococcus faecalis and strep pyogenes  Influenza A and B negative  Urine positive Enterococcus and E. Coli  COVID-19 negative     XR CHEST PORTABLE   Final Result   Suboptimal examination due to patient rotation. The chest appears stable. XR CHEST PORTABLE   Final Result   Endotracheal tube tip projects at the thoracic inlet. Otherwise stable chest.         XR CHEST PORTABLE   Final Result   Stable chest.         XR CHEST PORTABLE   Final Result   Stable endotracheal tube located approximately 3.3 cm above the quintin. Low lung volumes. Suspected small right pleural effusion. XR CHEST PORTABLE   Final Result   Endotracheal tube in satisfactory position above the quintin      Bibasilar hypoaeration persist         CT ABDOMEN PELVIS W IV CONTRAST Additional Contrast? None   Final Result   1. Moderate amount of ascites with 3rd spacing including edematous changes   throughout the abdomen and anasarca. 2. Delgado in place. Diffuse bladder wall thickening. Correlate for   underlying cystitis. 3. No acute bowel pathology. Mild gastric distension. Diverticulosis with   no acute features. 4. Mild renal cortical scarring and asymmetric right renal atrophy, stable. No hydronephrosis. CT CHEST PULMONARY EMBOLISM W CONTRAST   Final Result   1. No evidence of acute pulmonary embolism. There is some thickening and   mild irregularity in the pulmonary arteries in the left lower lobe which may   represent chronic pulmonary embolism or secondary appearance to inflammation. No evidence of aortic aneurysm or dissection. 2. Moderate right effusion and right lower lobe atelectatic and/or   consolidative changes. Pneumonia is likely. There is severe loss of volume   in the right lung. Trace left effusion and left lower lobe atelectatic   changes are seen. 3. Moderate ascites around the spleen and liver. XR CHEST PORTABLE   Final Result   Low lung volumes.   Bilateral pleural effusions with bibasilar

## 2022-04-26 NOTE — PROGRESS NOTES
Dr. Andrea Quinones at bedside    -adding vancomycin  -500 cc NS bolus  -keeping pt even  -titrate off dobutamine gtt    Antoine Smith RN, BSN

## 2022-04-26 NOTE — PROGRESS NOTES
CHG bath provided and all wounds cleansed and dressed per order. BP dropped again after pressers paused for blood draw. Recovered.  Electronically signed by Ricardo Wilcox RN on 4/26/2022 at 2:51 AM

## 2022-04-26 NOTE — PROGRESS NOTES
High risk vesicant drug infusing:  __________    Multiple incompatible medications infusing:  _________    CVP Monitoring:  _________    Extremely difficult IV access challenge:  ____x____    Continued need for central line access:  ____x______    Addressed with physician:  ___x_____    RIGHT PATIENT, RIGHT TIME, RIGHT LINE        Labs drawn from CRRT & sent. Will order replacements as warranted.
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
## 2022-04-26 NOTE — PROGRESS NOTES
Blood pressure 109/86, pulse 98, temperature 97.9 °F (36.6 °C), temperature source Bladder, resp. rate 18, height 6' 2\" (1.88 m), weight 272 lb 8 oz (123.6 kg), SpO2 98 %. Patient currently intubated with ETT at 32. Vent settings are 18/490/+5/45%. Ketamine infusing at .7mg/kg/hr  LEVOPHED infusing at a rate of 12 mcg/min. VASOPRESSIN infusing at 0.04 units/min  DOBUTAMINE infusing at 5mcg/kg/min        Pt continues to tolerate fluid removal of -75 on CRRT. Electrolytes replaced per protocol. Next lab draw is at 0200. Reassessment completed. No changes noted in physical assessment. Repositioned.  Electronically signed by Rolan Perez RN on 4/26/2022 at 12:32 AM
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
## 2022-04-26 NOTE — PROGRESS NOTES
04/26/22 1923   Patient Observation   Pulse 101   Resp 21   SpO2 97 %   Airway Clearance   Sputum Method Obtained Endotracheal   Sputum Amount Scant   Sputum Color/Odor Yellow   Sputum Consistency Thick   Vent Settings   FiO2  45 %   Resp Rate (Set) 18 bmp   Vt (Set, mL) 490 mL   PEEP/CPAP (cmH2O) 5   Vent Patient Data (Readings)   Plateau Pressure (cm H2O) 26 cm H2O   Static Compliance (L/cm H2O) 23   Dynamic Compliance (L/cm H2O) 17 L/cm H2O   Vent Observations - Device Integration   Vt Exhaled 484 mL   Rate Measured 20 br/min   Minute Volume 9.73 Liters   Peak Flow 55 L/min   Peak Inspiratory Pressure 33 cmH2O   Mean Airway Pressure 14 cmH20
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
## 2022-04-26 NOTE — PROGRESS NOTES
AM assessment completed. AM labs reviewed. Pt intubated & sedated- on ketamine gtt. Levophed, vasopressin & dobutamine gtt continues. TF at rate of 35 ml/hr. Delgado in place for strict I/o. Colostomy in place LUQ. PIV x2, R chest port, R IJ vas cath wnl. Pt in bilateral soft wrist restraints for safety. Óscar Hugger in place. CRRT running w/o issue- removing 75 cc/hr. No new orders at present time.   Antoine Smith RN, BSN

## 2022-04-26 NOTE — PROGRESS NOTES
RT Inhaler-Nebulizer Bronchodilator Protocol Note    There is a bronchodilator order in the chart from a provider indicating to follow the RT Bronchodilator Protocol and there is an Initiate RT Inhaler-Nebulizer Bronchodilator Protocol order as well (see protocol at bottom of note). CXR Findings:  XR CHEST PORTABLE    Result Date: 4/25/2022  Endotracheal tube tip projects at the thoracic inlet. Otherwise stable chest.     XR CHEST PORTABLE    Result Date: 4/24/2022  Stable chest.       The findings from the last RT Protocol Assessment were as follows:   History Pulmonary Disease: Chronic pulmonary disease  Respiratory Pattern: (P) Mild dyspnea at rest, irregular pattern, or RR 21-25 bpm  Breath Sounds: (P) Slightly diminished and/or crackles  Cough: (P) Weak, productive  Indication for Bronchodilator Therapy: (P) Decreased or absent breath sounds  Bronchodilator Assessment Score: (P) 10    Aerosolized bronchodilator medication orders have been revised according to the RT Inhaler-Nebulizer Bronchodilator Protocol below. Respiratory Therapist to perform RT Therapy Protocol Assessment initially then follow the protocol. Repeat RT Therapy Protocol Assessment PRN for score 0-3 or on second treatment, BID, and PRN for scores above 3. No Indications - adjust the frequency to every 6 hours PRN wheezing or bronchospasm, if no treatments needed after 48 hours then discontinue using Per Protocol order mode. If indication present, adjust the RT bronchodilator orders based on the Bronchodilator Assessment Score as indicated below. Use Inhaler orders unless patient has one or more of the following: on home nebulizer, not able to hold breath for 10 seconds, is not alert and oriented, cannot activate and use MDI correctly, or respiratory rate 25 breaths per minute or more, then use the equivalent nebulizer order(s) with same Frequency and PRN reasons based on the score.   If a patient is on this medication at home then do not decrease Frequency below that used at home. 0-3 - enter or revise RT bronchodilator order(s) to equivalent RT Bronchodilator order with Frequency of every 4 hours PRN for wheezing or increased work of breathing using Per Protocol order mode. 4-6 - enter or revise RT Bronchodilator order(s) to two equivalent RT bronchodilator orders with one order with BID Frequency and one order with Frequency of every 4 hours PRN wheezing or increased work of breathing using Per Protocol order mode. 7-10 - enter or revise RT Bronchodilator order(s) to two equivalent RT bronchodilator orders with one order with TID Frequency and one order with Frequency of every 4 hours PRN wheezing or increased work of breathing using Per Protocol order mode. 11-13 - enter or revise RT Bronchodilator order(s) to one equivalent RT bronchodilator order with QID Frequency and an Albuterol order with Frequency of every 4 hours PRN wheezing or increased work of breathing using Per Protocol order mode. Greater than 13 - enter or revise RT Bronchodilator order(s) to one equivalent RT bronchodilator order with every 4 hours Frequency and an Albuterol order with Frequency of every 2 hours PRN wheezing or increased work of breathing using Per Protocol order mode.          Electronically signed by Gabby Weiss RCP on 4/25/2022 at 9:11 PM

## 2022-04-26 NOTE — PLAN OF CARE
Nutrition Problem #1: Inadequate oral intake  Intervention: Food and/or Nutrient Delivery: Continue NPO,Modify Tube Feeding  Nutritional: Tolerate nutrition support at goal rate
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
## 2022-04-26 NOTE — PROGRESS NOTES
Blood pressure 92/69, pulse 100, temperature 97.3 °F (36.3 °C), temperature source Bladder, resp. rate 18, height 6' 2\" (1.88 m), weight 272 lb 8 oz (123.6 kg), SpO2 93 %. Patient currently intubated with ETT at 32. Vent settings are 18/490/+5/45%. Ketamine infusing at .7mg/kg/hr. LEVOPHED infusing at a rate of 14 mcg/min. VASOPRESSIN infusing at 0.04 units/min  DOBUTAMINE infusing at 5mcg/kg/min    Pt continues to tolerate fluid removal of -75 on CRRT. Pt will need additional 1 gr of Ca gluconate for ica 1.10 and 6mmol of Na phos for phos of 2.1. No other replacements needed at this time. Awaiting drips from pharmacy. Reassessment completed. No changes noted in physical assessment.  Electronically signed by Aden Rios RN on 4/26/2022 at 4:15 AM

## 2022-04-27 NOTE — PROGRESS NOTES
Admit: 4/21/2022    Name:  Michelle Butterfield  Room:  5089/8185-24  MRN:    [id number]    Critical Care Daily Progress Note for 4/27/2022       A 77-year-old male with a history of type 2 diabetes, end-stage renal disease on hemodialysis presented with septic shock, hypotension and high fevers. Admitted to the ICU. Placed on multiple pressors. Since admission has had progressive septic shock and worsening respiratory failure. In the ICU he was intubated and started on  mechanical ventilation    Interval History:     Intubated this morning the ICU. CRRT being initiated. Currently on Levophed, vasopressin and dobutamine    4/23  Continues to be on the vent  On Levophed, vasopressin, epi and  dobutamine  CRRT was held for about 6 hours because of access issues  Back on CRRT  Continues to run high fevers    4/24  SBT for 2 hours. Did well. Now off epi. Continues to be on Levophed, vasopressin and dobutamine. On CRRT. Fevers +    4/25-seen in the ICU for septic shock. On vasopressors. Sedated on the ventilator. CRRT-goal of 50 to 75 cc/h of fluid removal.    4/26-patient is awake and alert on the ventilator. He is afebrile. Tolerating CRRT. Blood pressure still low normal.  On vasopressors. 4/27- did not tolerate trying to decrease Dobutamine. Dropped his BP. Still on levophed and vasopressin. Some decrease in pressor requirements.      Scheduled Meds:   vancomycin  1,250 mg IntraVENous Once    clindamycin (CLEOCIN) IV  900 mg IntraVENous Q8H    albumin human  25 g IntraVENous Q8H    vancomycin (VANCOCIN) intermittent dosing (placeholder)   Other RX Placeholder    cefTRIAXone (ROCEPHIN) IV  1,000 mg IntraVENous Q24H    insulin lispro  0-12 Units SubCUTAneous Q4H    carboxymethylcellulose PF  1 drop Both Eyes Q4H    And    artificial tears   Both Eyes Q4H    chlorhexidine  15 mL Mouth/Throat BID    pantoprazole  40 mg IntraVENous Daily    ipratropium-albuterol  1 ampule Inhalation Q4H    menthol-zinc oxide   Topical Daily    apixaban  2.5 mg Oral BID    aspirin  81 mg Oral Nightly    docusate sodium  100 mg Oral BID    [Held by provider] insulin lispro  7 Units SubCUTAneous TID AC    [Held by provider] insulin glargine  20 Units SubCUTAneous BID    senna  1 tablet Oral Nightly    traZODone  50 mg Oral Nightly    sodium chloride flush  5-40 mL IntraVENous 2 times per day    hydrocortisone sodium succinate PF  100 mg IntraVENous Q8H       Continuous Infusions:   DOBUTamine (DOBUTREX) 2 mg/mL infusion 5 mcg/kg/min (22 0800)    dextrose      vasopressin (Septic Shock) infusion 0.04 Units/min (22 08)    ketamine (KETALAR) infusion 0.801 mg/kg/hr (22)    dextrose      prismaSATE BGK 4/2.5 500 mL/hr at 22 0155    prismaSATE BGK 4/2.5 500 mL/hr at 22 0155    prismaSATE BGK 4/2.5 500 mL/hr at 22 0155    norepinephrine 11 mcg/min (22 08)    sodium chloride Stopped (22 0946)       PRN Meds:  ipratropium-albuterol, glucose, glucagon (rDNA), dextrose, dextrose bolus (hypoglycemia) **OR** dextrose bolus (hypoglycemia), glucagon (rDNA), dextrose, dextrose bolus (hypoglycemia) **OR** dextrose bolus (hypoglycemia), potassium chloride, magnesium sulfate, calcium gluconate **OR** calcium gluconate **OR** calcium gluconate **OR** calcium gluconate, sodium phosphate IVPB **OR** sodium phosphate IVPB **OR** sodium phosphate IVPB **OR** sodium phosphate IVPB, glucose, midazolam, fentanNYL, sodium chloride flush, sodium chloride, ondansetron **OR** ondansetron, polyethylene glycol, acetaminophen **OR** acetaminophen, perflutren lipid microspheres            Objective:     Temp  Av.9 °F (36.6 °C)  Min: 96.4 °F (35.8 °C)  Max: 98.8 °F (37.1 °C)  Pulse  Av  Min: 95  Max: 105  BP  Min: 76/66  Max: 124/76  SpO2  Av.1 %  Min: 90 %  Max: 98 %  FiO2   Av.7 %  Min: 45 %  Max: 50 %  Patient Vitals for the past 4 hrs:   BP Temp Temp src Pulse Resp SpO2   04/27/22 0800 112/67 -- -- 99 22 93 %   04/27/22 0756 -- -- -- 99 20 98 %   04/27/22 0700 109/69 96.4 °F (35.8 °C) Bladder 96 20 95 %   04/27/22 0600 104/69 -- -- 95 21 --         Intake/Output Summary (Last 24 hours) at 4/27/2022 0903  Last data filed at 4/27/2022 0800  Gross per 24 hour   Intake 4810 ml   Output 4770 ml   Net 40 ml       Physical Exam:    General appearance:  More awake and alert. Intubated  HEENT: NCAT  Neck: Supple, with full range of motion. No jugular venous distention. Trachea midline. Respiratory: Diminished breath sounds bilaterally  Cardiovascular: Tachycardic  Abdomen: Soft, non-tender, non-distended with normal bowel sounds. Musculoskeletal: Right BKA  Skin: Did not examine the back but right lower extremity is erythematous and cellulitic and extends all the way to the stump. Tunneled dialysis catheter right chest wall  Neurologic:  Able to follow simple commands.       Lab Data:  CBC:   Recent Labs     04/25/22 0220 04/26/22  0559 04/27/22 0318   WBC 28.6* 31.2* 19.7*   RBC 4.20 4.19* 4.12*   HGB 11.0* 11.0* 10.9*   HCT 35.1* 34.8* 34.6*   MCV 83.6 83.1 84.1   RDW 18.7* 18.6* 18.7*   * 106* 99*     BMP:   Recent Labs     04/26/22 2027 04/27/22 0318 04/27/22  0808   * 131* 132*   K 3.7 3.5 3.7   CL 94* 95* 96*   CO2 21 23 24   PHOS 2.6 2.0* 2.2*   BUN 17 18 18   CREATININE 0.6* 0.7* 0.6*     BNP: No results for input(s): BNP in the last 72 hours. PT/INR: No results for input(s): PROTIME, INR in the last 72 hours. APTT:No results for input(s): APTT in the last 72 hours. CARDIAC ENZYMES:   No results for input(s): CKMB, CKMBINDEX, TROPONINI in the last 72 hours. Invalid input(s): CKTOTAL;3  FASTING LIPID PANEL:  Lab Results   Component Value Date    CHOL 80 01/07/2022    HDL 30 01/07/2022    HDL 38 10/04/2011    TRIG 154 01/07/2022     LIVER PROFILE:   No results for input(s): AST, ALT, ALB, BILIDIR, BILITOT, ALKPHOS in the last 72 hours. Cultures    4/21/2022 blood culture 2 out of 2 Enterococcus faecalis and strep pyogenes  Influenza A and B negative  Urine positive Enterococcus and E. Coli  COVID-19 negative     XR CHEST PORTABLE   Final Result   Endotracheal tube tip projects at the mid intrathoracic trachea. Otherwise   stable chest.         XR CHEST PORTABLE   Final Result   Suboptimal examination due to patient rotation. The chest appears stable. XR CHEST PORTABLE   Final Result   Endotracheal tube tip projects at the thoracic inlet. Otherwise stable chest.         XR CHEST PORTABLE   Final Result   Stable chest.         XR CHEST PORTABLE   Final Result   Stable endotracheal tube located approximately 3.3 cm above the quintin. Low lung volumes. Suspected small right pleural effusion. XR CHEST PORTABLE   Final Result   Endotracheal tube in satisfactory position above the quintin      Bibasilar hypoaeration persist         CT ABDOMEN PELVIS W IV CONTRAST Additional Contrast? None   Final Result   1. Moderate amount of ascites with 3rd spacing including edematous changes   throughout the abdomen and anasarca. 2. Delgado in place. Diffuse bladder wall thickening. Correlate for   underlying cystitis. 3. No acute bowel pathology. Mild gastric distension. Diverticulosis with   no acute features. 4. Mild renal cortical scarring and asymmetric right renal atrophy, stable. No hydronephrosis. CT CHEST PULMONARY EMBOLISM W CONTRAST   Final Result   1. No evidence of acute pulmonary embolism. There is some thickening and   mild irregularity in the pulmonary arteries in the left lower lobe which may   represent chronic pulmonary embolism or secondary appearance to inflammation. No evidence of aortic aneurysm or dissection. 2. Moderate right effusion and right lower lobe atelectatic and/or   consolidative changes. Pneumonia is likely. There is severe loss of volume   in the right lung.   Trace left effusion and left lower lobe atelectatic   changes are seen. 3. Moderate ascites around the spleen and liver. XR CHEST PORTABLE   Final Result   Low lung volumes. Bilateral pleural effusions with bibasilar volume loss,   right greater than left. No overt failure. XR CHEST PORTABLE    (Results Pending)         Assessment & Plan:     Principal Problem:    Septic shock ([address]  Active Problems:    Cardiogenic shock (HCC)    Atelectasis of right lower lobe    Bacteremia    Acute cystitis with hematuria    Streptococcal toxic shock syndrome (HCC)    Pneumonia due to infectious organism    Bullous cellulitis of right thigh    Hyperkalemia    Bacteremia due to Streptococcus pyogenes (HCC)    Anasarca associated with disorder of kidney    ESRD on dialysis [facility])  Resolved Problems:    * No resolved hospital problems. *         Septic shock. Enterococcus faecalis and strep pyogenes bacteremia. Right lower extremity cellulitis  Chronic pressure ulcers back  Blood urine and sputum cultures. Cultures from pressure ulcer wound in the back. Patient was on vancomycin, Merrem and clindamycin. Presently changed to clindamycin and ceftriaxone per ID. Also on Vanc. Off Merrem. ID managing this. Currently on Levophed,epi, vasopressin and dobutamine(low EF). Did not tolerate decreasing Dobutamine. Started on hydrocortisone for shock  Infectious disease consult. Fever curve improving. End-stage renal disease on hemodialysis. Nephrology consult  Initiated on CRRT    Acute hypoxic resp failure  Healthcare associated pneumonia. Intubated in the ICU on 4/22. Antibiotics as above. Chronic systolic congestive heart failure, EF 25 to 30%. Cardiology consult  Hold home medications given hypotension  Currently on dobutamine drip    Acute metabolic encephalopathy resolved. Secondary to septic shock  Able to follow simple commands    Type 2 diabetes.   Lantus insulin and sliding scale insulin    History of DVT  Continue Eliquis      Full code  DVT prophylaxis-Eliquis  GI prophylaxis-Protonix        Megan Primrose, MD 4/27/2022 9:03 AM

## 2022-04-27 NOTE — PROGRESS NOTES
Reassessment completed as documented on flowsheet, no change at this time. CRRT continues per order. All IV tubing changed on this date.

## 2022-04-27 NOTE — PROGRESS NOTES
RT Inhaler-Nebulizer Bronchodilator Protocol Note    There is a bronchodilator order in the chart from a provider indicating to follow the RT Bronchodilator Protocol and there is an Initiate RT Inhaler-Nebulizer Bronchodilator Protocol order as well (see protocol at bottom of note). CXR Findings:  XR CHEST PORTABLE    Result Date: 4/26/2022  Suboptimal examination due to patient rotation. The chest appears stable. XR CHEST PORTABLE    Result Date: 4/25/2022  Endotracheal tube tip projects at the thoracic inlet. Otherwise stable chest.       The findings from the last RT Protocol Assessment were as follows:   History Pulmonary Disease: (P) Chronic pulmonary disease  Respiratory Pattern: (P) Mild dyspnea at rest, irregular pattern, or RR 21-25 bpm  Breath Sounds: (P) Slightly diminished and/or crackles  Cough: (P) Weak, productive  Indication for Bronchodilator Therapy: (P) Decreased or absent breath sounds  Bronchodilator Assessment Score: (P) 10    Aerosolized bronchodilator medication orders have been revised according to the RT Inhaler-Nebulizer Bronchodilator Protocol below. Respiratory Therapist to perform RT Therapy Protocol Assessment initially then follow the protocol. Repeat RT Therapy Protocol Assessment PRN for score 0-3 or on second treatment, BID, and PRN for scores above 3. No Indications - adjust the frequency to every 6 hours PRN wheezing or bronchospasm, if no treatments needed after 48 hours then discontinue using Per Protocol order mode. If indication present, adjust the RT bronchodilator orders based on the Bronchodilator Assessment Score as indicated below.   Use Inhaler orders unless patient has one or more of the following: on home nebulizer, not able to hold breath for 10 seconds, is not alert and oriented, cannot activate and use MDI correctly, or respiratory rate 25 breaths per minute or more, then use the equivalent nebulizer order(s) with same Frequency and PRN reasons based on the score. If a patient is on this medication at home then do not decrease Frequency below that used at home. 0-3 - enter or revise RT bronchodilator order(s) to equivalent RT Bronchodilator order with Frequency of every 4 hours PRN for wheezing or increased work of breathing using Per Protocol order mode. 4-6 - enter or revise RT Bronchodilator order(s) to two equivalent RT bronchodilator orders with one order with BID Frequency and one order with Frequency of every 4 hours PRN wheezing or increased work of breathing using Per Protocol order mode. 7-10 - enter or revise RT Bronchodilator order(s) to two equivalent RT bronchodilator orders with one order with TID Frequency and one order with Frequency of every 4 hours PRN wheezing or increased work of breathing using Per Protocol order mode. 11-13 - enter or revise RT Bronchodilator order(s) to one equivalent RT bronchodilator order with QID Frequency and an Albuterol order with Frequency of every 4 hours PRN wheezing or increased work of breathing using Per Protocol order mode. Greater than 13 - enter or revise RT Bronchodilator order(s) to one equivalent RT bronchodilator order with every 4 hours Frequency and an Albuterol order with Frequency of every 2 hours PRN wheezing or increased work of breathing using Per Protocol order mode.        Electronically signed by Justin Krueger RCP on 4/26/2022 at 8:33 PM

## 2022-04-27 NOTE — CONSULTS
[facility] Wound Ostomy Continence Nurse  Follow-up Progress Note       NAME:  [address] RECORD NUMBER:  [id number]  AGE:  49 y.o. GENDER:  male  :  1965  TODAY'S DATE:  2022    Subjective:   Wound Identification:  Wound Type: non-healing surgical and unclear etiology to right stump[  Contributing Factors: edema and paraplegia, right bka, septic, pressors        Patient Goal of Care:  [x] Wound Healing  [] Odor Control  [] Palliative Care  [x] Pain Control   [] Other:     Objective:    /77   Pulse 96   Temp 98 °F (36.7 °C) (Bladder)   Resp 19   Ht 6' 2\" (1.88 m)   Wt 273 lb 4.8 oz (124 kg)   SpO2 94%   BMI 35.09 kg/m²   Ismael Risk Score: Ismael Scale Score: 11  Assessment:   Measurements:  Wound 21 #82, Left Posterior Lower Leg Proximal, pressure Unstageable,, onset 2021 (Active)   Wound Image    22 194   Wound Etiology Pressure Unstageable 22 1406   Dressing Status Dry; Intact 22 1200   Wound Cleansed Wound cleanser 22 1200   Dressing/Treatment Foam 22 1200   Dressing Change Due 22 1200   Wound Length (cm) 1.5 cm 22 1406   Wound Width (cm) 0.5 cm 22 1406   Wound Depth (cm) 0.1 cm 22 1406   Wound Surface Area (cm^2) 0.75 cm^2 22 1406   Change in Wound Size % (l*w) -525 22 1406   Wound Volume (cm^3) 0.075 cm^3 22 1406   Wound Healing % -525 22 1406   Distance Tunneling (cm) 0 cm 22 1406   Undermining Maxium Distance (cm) 0 22 1406   Wound Assessment Pink/red 22 1200   Drainage Amount Scant 22 1200   Drainage Description Serous 22 1200   Odor None 22 1200   Chetna-wound Assessment Ecchymosis 22 1406   Number of days: 173       Wound 21 # 81, mid-back, Surgical, full thickness, onset 2015 (Active)   Wound Image   22 1945   Wound Etiology Surgical 22 1406   Dressing Status Intact 22 1200   Wound Cleansed Wound cleanser 04/24/22 1515   Dressing/Treatment Other (comment); Foam 04/27/22 1200   Dressing Change Due 04/27/22 04/27/22 1200   Wound Length (cm) 9.7 cm 04/20/22 1406   Wound Width (cm) 10 cm 04/20/22 1406   Wound Depth (cm) 0.3 cm 04/20/22 1406   Wound Surface Area (cm^2) 97 cm^2 04/20/22 1406   Change in Wound Size % (l*w) -134.98 04/20/22 1406   Wound Volume (cm^3) 29.1 cm^3 04/20/22 1406   Wound Healing % -17 04/20/22 1406   Wound Assessment Exposed hardware;Pale granulation tissue;Slough 04/24/22 1515   Drainage Amount Moderate 04/24/22 1515   Drainage Description Sanguinous 04/24/22 1515   Odor Mild 04/20/22 1406   Chetna-wound Assessment Denuded;Fragile 04/20/22 1406   Number of days: 173       Wound 04/01/22 #85, Left Pretib, Trauma, Full Thickness, Onset 3/2022 (Active)   Wound Image   04/21/22 1945   Wound Etiology Traumatic 04/20/22 1406   Dressing Status Dry; Intact 04/27/22 1200   Wound Cleansed Wound cleanser 04/23/22 0430   Dressing/Treatment Foam 04/27/22 1200   Dressing Change Due 04/29/22 04/27/22 1200   Wound Length (cm) 1 cm 04/20/22 1406   Wound Width (cm) 1 cm 04/20/22 1406   Wound Depth (cm) 0.2 cm 04/20/22 1406   Wound Surface Area (cm^2) 1 cm^2 04/20/22 1406   Change in Wound Size % (l*w) -150 04/20/22 1406   Wound Volume (cm^3) 0.2 cm^3 04/20/22 1406   Wound Healing % -400 04/20/22 1406   Wound Assessment Pink/red 04/27/22 1200   Drainage Amount Small 04/27/22 1200   Drainage Description Serosanguinous 04/27/22 1200   Odor None 04/27/22 1200   Chetna-wound Assessment Hyperpigmented 04/20/22 1406   Margins Attached edges 04/20/22 1406   Wound Thickness Description not for Pressure Injury Full thickness 04/20/22 1406   Number of days: 26     Back wound:  4 areas of exposed hardware, periwound skin is macerated with scattered areas of skin stripping and chronically red blanchable skin    Right medial thigh:  Ruptured blood filled bullas, scattered purple dusky areas in base but mostly red and moist.  Edematous and taut, but improved. Response to treatment:  Well tolerated by patient. Pain Assessment:  Severity:  0 / 10  Quality of pain: N/A  Wound Pain Timing/Severity: none  Premedicated: Yes  Plan:  Right stump:  Cleanse with NS, pat dry. Apply Xeroform gauze to open areas and blistrs, cover with ABD pads, minimize tape use on skin  Continue current treatment to back and LLE wounds.      Plan of Care: Wound 04/01/22 #85, Left Pretib, Trauma, Full Thickness, Onset 3/2022-Dressing/Treatment: Foam  Wound 11/05/21 #82, Left Posterior Lower Leg Proximal, pressure Unstageable,, onset 11/02/2021-Dressing/Treatment: Foam (Opticel Ag, Mepilex )  Wound 11/05/21 # 81, mid-back, Surgical, full thickness, onset June 2015-Dressing/Treatment: Other (comment),Foam (Opticel AG)    Specialty Bed Required : Yes   [x] Low Air Loss   [] Pressure Redistribution  [x] Fluid Immersion  [] Bariatric  [] Total Pressure Relief  [] Other:     Current Diet: Diet NPO  ADULT TUBE FEEDING; Orogastric; Renal Formula; Continuous; 35; Yes; 5; Q 4 hours; 40; 30; Q 4 hours; Protein; one proteinex P2Go TWICE daily via feeding tube  Dietician consult:  Yes    Discharge Plan:  Placement for patient upon discharge: TBD  Patient appropriate for Outpatient [address]: Yes    Referrals:  [x]   [] [address] [facility]  [] Supplies  [] Other    Patient/Caregiver Teaching:  Level of patient/caregiver understanding able to:   [] Indicates understanding       [] Needs reinforcement  [] Unsuccessful      [] Verbal Understanding  [] Demonstrated understanding       [] No evidence of learning  [] Refused teaching         [x] N/A       Electronically signed by Garry Carnes RN, Juanita Austin on 4/27/2022 at 4:23 PM

## 2022-04-27 NOTE — PROGRESS NOTES
Reassessment completed, see flowsheets, unchanged from prior. VSS. Levophed 9 mcg/min, vasopressin 0.04 units/min, dobutamine 5 mcg/kg/min, ketamine 0.8 mg/kg/hr. CRRT running well, keeping pt even, pt tolerating and slowing weaning levophed down. All ICU Lines and monitoring remain in place. Óscar hugger in place on low. Bed locked in lowest position. Call light within reach.

## 2022-04-27 NOTE — PROGRESS NOTES
RT Inhaler-Nebulizer Bronchodilator Protocol Note    There is a bronchodilator order in the chart from a provider indicating to follow the RT Bronchodilator Protocol and there is an Initiate RT Inhaler-Nebulizer Bronchodilator Protocol order as well (see protocol at bottom of note). CXR Findings:  XR CHEST PORTABLE    Result Date: 4/27/2022  Endotracheal tube tip projects at the mid intrathoracic trachea. Otherwise stable chest.     XR CHEST PORTABLE    Result Date: 4/26/2022  Suboptimal examination due to patient rotation. The chest appears stable. The findings from the last RT Protocol Assessment were as follows:   History Pulmonary Disease: (P) Chronic pulmonary disease  Respiratory Pattern: (P) Mild dyspnea at rest, irregular pattern, or RR 21-25 bpm  Breath Sounds: (P) Inspiratory and expiratory or bilateral wheezing and/or rhonchi  Cough: (P) Weak, productive  Indication for Bronchodilator Therapy: (P) Decreased or absent breath sounds  Bronchodilator Assessment Score: (P) 14    Aerosolized bronchodilator medication orders have been revised according to the RT Inhaler-Nebulizer Bronchodilator Protocol below. Respiratory Therapist to perform RT Therapy Protocol Assessment initially then follow the protocol. Repeat RT Therapy Protocol Assessment PRN for score 0-3 or on second treatment, BID, and PRN for scores above 3. No Indications - adjust the frequency to every 6 hours PRN wheezing or bronchospasm, if no treatments needed after 48 hours then discontinue using Per Protocol order mode. If indication present, adjust the RT bronchodilator orders based on the Bronchodilator Assessment Score as indicated below.   Use Inhaler orders unless patient has one or more of the following: on home nebulizer, not able to hold breath for 10 seconds, is not alert and oriented, cannot activate and use MDI correctly, or respiratory rate 25 breaths per minute or more, then use the equivalent nebulizer order(s) with same Frequency and PRN reasons based on the score. If a patient is on this medication at home then do not decrease Frequency below that used at home. 0-3 - enter or revise RT bronchodilator order(s) to equivalent RT Bronchodilator order with Frequency of every 4 hours PRN for wheezing or increased work of breathing using Per Protocol order mode. 4-6 - enter or revise RT Bronchodilator order(s) to two equivalent RT bronchodilator orders with one order with BID Frequency and one order with Frequency of every 4 hours PRN wheezing or increased work of breathing using Per Protocol order mode. 7-10 - enter or revise RT Bronchodilator order(s) to two equivalent RT bronchodilator orders with one order with TID Frequency and one order with Frequency of every 4 hours PRN wheezing or increased work of breathing using Per Protocol order mode. 11-13 - enter or revise RT Bronchodilator order(s) to one equivalent RT bronchodilator order with QID Frequency and an Albuterol order with Frequency of every 4 hours PRN wheezing or increased work of breathing using Per Protocol order mode. Greater than 13 - enter or revise RT Bronchodilator order(s) to one equivalent RT bronchodilator order with every 4 hours Frequency and an Albuterol order with Frequency of every 2 hours PRN wheezing or increased work of breathing using Per Protocol order mode.          Electronically signed by Janis Colon RCP on 4/27/2022 at 7:46 PM

## 2022-04-27 NOTE — PROGRESS NOTES
04/27/22 1920   Patient Observation   Pulse 93   SpO2 96 %   Airway Clearance   Sputum Method Obtained Endotracheal   Sputum Amount Small   Sputum Color/Odor Yellow   Sputum Consistency Thick   Vent Information   Vent Mode AC/VC   Vent Settings   FiO2  50 %   Resp Rate (Set) 18 bmp   Vt (Set, mL) 490 mL   PEEP/CPAP (cmH2O) 5   Vent Observations - Device Integration   Vt Exhaled 490 mL   Rate Measured 20 br/min   Minute Volume 9.92 Liters   Peak Flow 55 L/min   Peak Inspiratory Pressure 31 cmH2O   Mean Airway Pressure 14 cmH20

## 2022-04-27 NOTE — CARE COORDINATION
INTERDISCIPLINARY PLAN OF CARE CONFERENCE    Date/Time: 4/27/2022 8:59 AM  Completed by: CONRAD Reed  Case Management      Patient Name:  Norah Hughes  YOB: 1965  Admitting Diagnosis: Hyperkalemia [E87.5]  Hypoglycemia [E16.2]  ESRD on dialysis ([facility] [N18.6, Z99.2]  Acute cystitis with hematuria [N30.01]  Septic shock ([facility] [address] [A41.9, R65.21]  Sepsis ([facility] [address] [A41.9]  Pneumonia due to infectious organism, unspecified laterality, unspecified part of lung [J18.9]     Admit Date/Time:  4/21/2022  1:21 PM    Chart reviewed. Interdisciplinary team contacted or reviewed plan related to patient progress and discharge plans. Disciplines included Case Management, Nursing, and Dietitian. Current Status: ongoing. IV ABX. CRRT. PT/OT recommendation for discharge plan of care: n/a    Expected D/C Disposition:  Home    Discharge Plan Comments: Chart review completed. Completed Interdisciplinary rounds with ICU staff. Pt remains in the ICU and is on a Vent. Pt is from home with family and plans on returning home. CM will continue to follow and assist. Please notify CM if needs or concerns arise.     [facility] in [facility] on admit[id number]

## 2022-04-27 NOTE — PROGRESS NOTES
Shift assessment completed. Pt sedated and mechanically ventilated. Ketamine drip per MAR, pt follows simple commands, RASS -2. CRRT continues without concerns. IV per MAR. Abd round and distended. Colostomy yellow drainage. Tube feeding at goal. Omkar wrist restraints. No concerns noted at this time.

## 2022-04-27 NOTE — PROGRESS NOTES
Dr. Liz Alvarado at the bedside to examine pt. See progress note for details. Continue to keep pt even.

## 2022-04-27 NOTE — PROGRESS NOTES
Shift assessment, completed, see flow sheet. Pt RASS -2, following commands.      Intubated and sedated with a # 7.5 ETT, at 26 LL. On AC 18 / 490 /50 %/ 8.  Temp 96.6 via bladder, SR 96, BP 113/73 (83), SpO2 95%. Respirations are easy, even, and unlabored. Bilateral lung sounds rhonchi/diminished. OG in place at 70, with TF 40 ml/hr (goal).      R BKA, red/warm with areas of purple and open blisters that are sloughing.     R PAC, WNL with levophed 11 mcg/min, vasopressin 0.04 units/min, dobutamine 5 mcg/kg/min infusing.      3 PIVs, WNL, with ketamine 0.8 mg/kg/hr and KVO infusing.     R tunneled vas cath with CRRT running,  ml/min, Pre 500 ml/hr, Post 500 ml/hr, dialysate 500 ml/hr. Keep pt even.     Óscar hugger in place and on high. Delgado in place and patent with britney output. LUQ colostomy with green/yellow liquid output.  Bilateral soft wrist restraints in place for pt safety.      Pt on dolphin immersion mattress. Will continue to monitor
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
## 2022-04-27 NOTE — PROGRESS NOTES
Filter changed due to 72 hours on current filter.  Machine failed test, new machine to continue  CRRT
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
## 2022-04-27 NOTE — PROGRESS NOTES
Reassessment completed, see flowsheets, unchanged from prior. VSS.      Levophed 10 mcg/min, vasopressin 0.04 units/min, dobutamine 5 mcg/kg/min, ketamine 0.8 mg/kg/hr.      CRRT running well, keeping pt even, pt tolerating.      All ICU Lines and monitoring remain in place. Óscar hugger in place on low. Bed locked in lowest position. Call light within reach.

## 2022-04-27 NOTE — PROGRESS NOTES
4/27  Vanc random = 14.4 mcg/mL at 0523. Give vancomycin 1250 mg x1. Continue with Vanc levels daily.   Marianne Bales, PharmD  4/27/2022 7:13 AM
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
## 2022-04-27 NOTE — PROGRESS NOTES
Care rounds completed with Dr. Warren Ansari and multidisciplinary team. Reviewed labs, meds, VS, assessment, & plan of care for today. See dictated note and new orders for details.        Increase to high dose SSI  Lantus 40 units in AM

## 2022-04-28 NOTE — PROGRESS NOTES
Vancomycin Day # 6  Current dose = Pulse dosing per daily vancomycin levels. BUN/SRCR 23/0.6     WBC   10.1            Tmax 98  Vancomycin random level this am = 17 mcg/ml  Will give vancomycin 1000 x1 dose @ 0600 today and recheck random level tomorrow am.  Pharmacy will continue to obtain daily random vancomycin levels and redose as appropriate.

## 2022-04-28 NOTE — PROGRESS NOTES
Care rounds completed with Dr. Anna Smith and multidisciplinary team. Reviewed labs, meds, VS, assessment, & plan of care for today. See dictated note and new orders for details. Increase lantus to 55 units AM and 20 units HS.    DC solucortef

## 2022-04-28 NOTE — PROGRESS NOTES
Shift assessment, completed, see flow sheet. Pt RASS -1, following commands.      Intubated and sedated with a # 7.5 ETT, at 26 LL.   On AC 18 / 490 /45 %/ 5.  Temp 98.6 via bladder, SR 100, BP 116/92 (97), SpO2 93%. Respirations are easy, even, and unlabored. Bilateral lung sounds rhonchi/diminished. OG in place at 70, with TF 40 ml/hr (goal).      R BKA, red/warm with areas of purple and open blisters that are sloughing. Covered with xeroform and ABD pads     R PAC, WNL with levophed 7 mcg/min, vasopressin 0.04 units/min, dobutamine 5 mcg/kg/min infusing.      3 PIVs, WNL, with ketamine 0.4 mg/kg/hr, sodium phos replacement, and KVO infusing.     R tunneled vas cath with CRRT running,  ml/min, Pre 500 ml/hr, Post 500 ml/hr, dialysate 500 ml/hr. Keep pt even.     Óscar hugger in place and on high. Delgado in place and patent with britney output.  LUQ colostomy with green/yellow liquid output. Bilateral soft wrist restraints in place for pt safety.      Pt on dolphin immersion mattress. Will continue to monitor

## 2022-04-28 NOTE — PROGRESS NOTES
04/28/22 1932   Patient Observation   Pulse 92   Resp 22   SpO2 97 %   Breath Sounds   Right Upper Lobe Rhonchi   Right Middle Lobe Rhonchi   Right Lower Lobe Rhonchi   Left Upper Lobe Rhonchi   Left Lower Lobe Rhonchi   Airway Clearance   Suction ET Tube;Oral   Suction Device Inline suction catheter;Yankauer   Sputum Method Obtained Cough on request   Sputum Amount Moderate   Sputum Color/Odor White;Yellow   Sputum Consistency Thick   Skin Assessment   Skin Assessment Clean, dry, & intact   Vent Information   Vent Mode AC/VC   Vent Settings   FiO2  45 %   Resp Rate (Set) 18 bmp   Vt (Set, mL) 490 mL   PEEP/CPAP (cmH2O) 5   I:E Ratio 1:2.3   Trigger Sensitivity Flow (L/min) 3 L/min   Humidification 98.6 °F (37 °C)   Heater Temperature 99.5 °F (37.5 °C)   Vent Patient Data (Readings)   Resp Rate Observed 19   Vt (Observed, mL) 486 mL   Minute Ventilation (l/min) 9 l/min   I:E Ratio 1:2.10   PIP Observed (cm H2O) 32 cm H2O   Plateau Pressure (cm H2O) 29 cm H2O   Vent Alarm Settings   High Pressure  40 cmH2O   Ve Min 4   Vt Low  300 mL   RR High 40 br/min   Apnea (Secs) 20 secs   Ambu Bag With Mask At Bedside Yes   Vent Observations - Device Integration   Vt Exhaled 498 mL   Rate Measured 21 br/min   Minute Volume 10.5 Liters   Peak Flow 55 L/min   Pressure Support 0 cmH20   Peak Inspiratory Pressure 30 cmH2O   Sensitivity 3   High Peep/I Pressure 0   I Time/ I Time % 0 s   Low Minute Volume Alarm 4 L/min   Mean Airway Pressure 14 cmH20   Additional Respiratory Assessments   Position Semi-Mejía's   Humidification Source Heated wire   Humidification Temp 37   Circuit Condensation Drained   Subglottic Suction Done Yes   ETT (adult)   Placement Date/Time: 04/22/22 0945   Present on Admission/Arrival: No  Placed By: Licensed provider  Placement Verified By: Chest X-ray  Preoxygenation: Yes  Mask Ventilation: Mask ventilation not attempted (0)  Technique: Rapid sequence;Direct laryng. ..    Secured At 26 cm   Measured From Lips   ETT Placement Right   Secured By Commercial tube simon   Site Assessment Dry   Ventilator Associated Pneumonia Bundle   Elevation of Head of Bed to 30-45 Degrees  Yes   Oral Care Performed Mouth suctioned   Oral Suctioning Yes   ETT/Trach Suctioning Yes

## 2022-04-28 NOTE — PROGRESS NOTES
Dr. Jai Sinclair at the bedside to examine pt. See progress note for details. Remove 50 ml/hr. If levophed requirement up to 10 mcg/min then keep pt even.    Add midodrine TID

## 2022-04-28 NOTE — PROGRESS NOTES
Bedside report to HARMAN Lynn RN. Reviewed IV infusions and POC. All lines and tubing remain secured in place and patent. Patient care transferred in stable condition.     Electronically signed by Shalom Cooper RN on 4/28/2022 at 7:25 AM

## 2022-04-28 NOTE — PROGRESS NOTES
Reassessment completed, see flowsheets, unchanged from prior. VSS.      Levophed 5 mcg/min, vasopressin 0.04 units/min, dobutamine 5 mcg/kg/min, ketamine 0.4 mg/kg/hr.      CRRT running well, removing 50 ml/hr, pt tolerating and slowing weaning levophed down.      All ICU Lines and monitoring remain in place. Óscar hugger in place on low. Bed locked in lowest position. Call light within reach.
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
## 2022-04-28 NOTE — PROGRESS NOTES
Shift assessment completed, see flow sheet. RASS -2, CPOT 0. Following commands. Wakes to voice stimuli but easily goes back to sleep. Ketamine titrated infusion for sedation with goal RASS -1 - 0    Intubated and sedated on AC # 7.5 ETT, at 26 LL. 18 / 490/  50%/ +5. SpO2 97%. Respirations are easy, even, and unlabored. Bilateral lung sounds diminished throughout fields. VSS  OG in place at 70 cm LL, with TF at 40 mL/hr and water flushes 30 ml Q4h with 15 ml residual.      Port access/ VC/PIV, WNL with all lines secured and patent. All lines and monitoring devices in place. Delgado is patent and secured with scant amount of output noted. Bilateral soft wrist restraints in place for patient safety. CRRT continues with patient tolerating well. Goal to keep even for fluid balance. Bed in lowest position with wheels locked. No needs expressed at this time. Will continue to monitor.   Electronically signed by Emerita Lewis RN on 4/27/2022 at 10:35 PM

## 2022-04-28 NOTE — PROGRESS NOTES
Reassessment completed, see flowsheets, unchanged from prior. VSS.       Vasopressin 0.04 units/min, dobutamine 5 mcg/kg/min, ketamine 0.4 mg/kg/hr. Levophed stopped at this time     CRRT running well, removing 50 ml/hr, pt tolerating.     All ICU Lines and monitoring remain in place. Óscar hugger off. Bed locked in lowest position.  Call light within reach.

## 2022-04-28 NOTE — CONSULTS
Pt seen for pouch change. Old appliance removed using adhesive remover spray. Old pouch in place since Friday 4/22 with good seal on back of old wafer. Skin cleansed with water and patted dry. Stoma pink, moist and viable, peristomal skin intact. Lashonda seal placed all around stoma, pt repouched using 2 1/4\" wafer and drainable pouch with good seal.  Will follow for wound/ostomy care.
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
## 2022-04-28 NOTE — PROGRESS NOTES
0307: /79 MAP 91. Decreased levophed to 8 mcg/min    0553: /80 MAP 95. Decreased levophed to 7 mcg/min.   Electronically signed by Bal Matson RN on 4/28/2022 at 6:39 AM
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
## 2022-04-28 NOTE — PROGRESS NOTES
Admit: 4/21/2022    Name:  Jacques Briggs  Room:  [facility]/4151-09  MRN:    [id number]    Critical Care Daily Progress Note for 4/28/2022       A 46-year-old male with a history of type 2 diabetes, end-stage renal disease on hemodialysis presented with septic shock, hypotension and high fevers. Admitted to the ICU. Placed on multiple pressors. Since admission has had progressive septic shock and worsening respiratory failure. In the ICU he was intubated and started on  mechanical ventilation    Interval History:     Intubated this morning the ICU. CRRT being initiated. Currently on Levophed, vasopressin and dobutamine    4/23  Continues to be on the vent  On Levophed, vasopressin, epi and  dobutamine  CRRT was held for about 6 hours because of access issues  Back on CRRT  Continues to run high fevers    4/24  SBT for 2 hours. Did well. Now off epi. Continues to be on Levophed, vasopressin and dobutamine. On CRRT. Fevers +    4/25-seen in the ICU for septic shock. On vasopressors. Sedated on the ventilator. CRRT-goal of 50 to 75 cc/h of fluid removal.    4/26-patient is awake and alert on the ventilator. He is afebrile. Tolerating CRRT. Blood pressure still low normal.  On vasopressors. 4/28- did not tolerate trying to decrease Dobutamine. Dropped his BP. Still on levophed and vasopressin.    Remains on CRRT, sedated with ketamine, he is awake and nodding head for answers      Scheduled Meds:   insulin glargine  40 Units SubCUTAneous QAM    insulin lispro  0-18 Units SubCUTAneous Q4H    clindamycin (CLEOCIN) IV  900 mg IntraVENous Q8H    vancomycin (VANCOCIN) intermittent dosing (placeholder)   Other RX Placeholder    cefTRIAXone (ROCEPHIN) IV  1,000 mg IntraVENous Q24H    carboxymethylcellulose PF  1 drop Both Eyes Q4H    And    artificial tears   Both Eyes Q4H    chlorhexidine  15 mL Mouth/Throat BID    pantoprazole  40 mg IntraVENous Daily    ipratropium-albuterol  1 ampule Inhalation 04/28/22 0500 125/83 99 20 --   04/28/22 0400 129/78 96 20 95 %         Intake/Output Summary (Last 24 hours) at 4/28/2022 0726  Last data filed at 4/28/2022 0700  Gross per 24 hour   Intake 5259 ml   Output 4862 ml   Net 397 ml       Physical Exam:    General appearance:  middle aged male on mild sedation More awake and alert. Intubated  HEENT: NAD, oral ETT and OG noted   Neck: Supple, . No jugular venous distention. Trachea midline. Respiratory: Diminished breath sounds bilaterally  Cardiovascular: S 1 s2 heard, Tachycardic  Right chest HD catheter and Port noted  Abdomen: Soft, non-tender, non-distended with normal bowel sounds. Colostomy noted in left LQ  Musculoskeletal: Right BKA with stump healthy. Superficial skin ulcers with no active infection noted  Left LE edema with superficial ulceration noted   Skin: see Epic wound pictures, chronic exposed hardware in lower thoracic and lumbar region   Neurologic:  Able to follow simple commands.       Lab Data:  CBC:   Recent Labs     04/26/22  0559 04/27/22  0318 04/28/22  0315   WBC 31.2* 19.7* 10.1   RBC 4.19* 4.12* 3.93*   HGB 11.0* 10.9* 10.5*   HCT 34.8* 34.6* 32.7*   MCV 83.1 84.1 83.3   RDW 18.6* 18.7* 18.8*   * 99* 83*     BMP:   Recent Labs     04/27/22  1407 04/27/22 2010 04/28/22  0315   * 131* 131*   K 3.6 3.6 3.6   CL 95* 95* 95*   CO2 23 23 24   PHOS 1.9* 2.5 1.9*   BUN 19 20 23*   CREATININE 0.6* 0.6* 0.6*     BNP: No results for input(s): BNP in the last 72 hours. PT/INR: No results for input(s): PROTIME, INR in the last 72 hours. APTT:No results for input(s): APTT in the last 72 hours. CARDIAC ENZYMES:   No results for input(s): CKMB, CKMBINDEX, TROPONINI in the last 72 hours.     Invalid input(s): CKTOTAL;3  FASTING LIPID PANEL:  Lab Results   Component Value Date    CHOL 80 01/07/2022    HDL 30 01/07/2022    HDL 38 10/04/2011    TRIG 154 01/07/2022     LIVER PROFILE:   No results for input(s): AST, ALT, ALB, BILIDIR, BILITOT, ALKPHOS in the last 72 hours. Cultures    4/21/2022 blood culture 2 out of 2 Enterococcus faecalis and strep pyogenes  Influenza A and B negative  Urine positive Enterococcus and E. Coli  COVID-19 negative     XR CHEST PORTABLE   Final Result   Stable chest.         XR CHEST PORTABLE   Final Result   Endotracheal tube tip projects at the mid intrathoracic trachea. Otherwise   stable chest.         XR CHEST PORTABLE   Final Result   Suboptimal examination due to patient rotation. The chest appears stable. XR CHEST PORTABLE   Final Result   Endotracheal tube tip projects at the thoracic inlet. Otherwise stable chest.         XR CHEST PORTABLE   Final Result   Stable chest.         XR CHEST PORTABLE   Final Result   Stable endotracheal tube located approximately 3.3 cm above the quintin. Low lung volumes. Suspected small right pleural effusion. XR CHEST PORTABLE   Final Result   Endotracheal tube in satisfactory position above the quintin      Bibasilar hypoaeration persist         CT ABDOMEN PELVIS W IV CONTRAST Additional Contrast? None   Final Result   1. Moderate amount of ascites with 3rd spacing including edematous changes   throughout the abdomen and anasarca. 2. Delgado in place. Diffuse bladder wall thickening. Correlate for   underlying cystitis. 3. No acute bowel pathology. Mild gastric distension. Diverticulosis with   no acute features. 4. Mild renal cortical scarring and asymmetric right renal atrophy, stable. No hydronephrosis. CT CHEST PULMONARY EMBOLISM W CONTRAST   Final Result   1. No evidence of acute pulmonary embolism. There is some thickening and   mild irregularity in the pulmonary arteries in the left lower lobe which may   represent chronic pulmonary embolism or secondary appearance to inflammation. No evidence of aortic aneurysm or dissection.    2. Moderate right effusion and right lower lobe atelectatic and/or   consolidative changes. Pneumonia is likely. There is severe loss of volume   in the right lung. Trace left effusion and left lower lobe atelectatic   changes are seen. 3. Moderate ascites around the spleen and liver. XR CHEST PORTABLE   Final Result   Low lung volumes. Bilateral pleural effusions with bibasilar volume loss,   right greater than left. No overt failure. XR CHEST PORTABLE    (Results Pending)         Assessment & Plan:     Principal Problem:    Septic shock ([address]  Active Problems:    Cardiogenic shock (HCC)    Atelectasis of right lower lobe    Bacteremia    Acute cystitis with hematuria    Streptococcal toxic shock syndrome (HCC)    Pneumonia due to infectious organism    Bullous cellulitis of right thigh    Hyperkalemia    Bacteremia due to Streptococcus pyogenes (HCC)    Anasarca associated with disorder of kidney    ESRD on dialysis [facility])  Resolved Problems:    * No resolved hospital problems. *         Septic shock. Enterococcus faecalis and strep pyogenes bacteremia. Right lower extremity cellulitis  Chronic pressure ulcerson the back with exposed hardware     Source could be HD line vs exposed hardware  Patient was on vancomycin, Merrem and clindamycin. Presently changed to clindamycin and ceftriaxone per ID. Also on Vanc. Off Merrem. ID managing this. Improving wbc and sepsis  Repeat cx remain neg    Severe hypotensive shock  on Levophed, vasopressin and dobutamine(low EF). Did not tolerate decreasing Dobutamine. on hydrocortisone for shock  Critical care managing    End-stage renal disease on hemodialysis. Nephrology consulted  Initiated on CRRT   Can start fluid removal     Acute hypoxic resp failure  Healthcare associated pneumonia. Intubated in the ICU on 4/22. Antibiotics as above. Chronic systolic congestive heart failure, EF 25 to 30%.   Cardiology consulted  Hold home medications given hypotension  Currently on dobutamine and multiple pressors    Acute metabolic encephalopathy resolved. Secondary to septic shock  Improving   Able to follow simple commands    Type 2 diabetes.   Lantus insulin and sliding scale insulin    History of DVT  Continue Eliquis      Full code  On TF   DVT prophylaxis-Eliquis  GI prophylaxis-Protonix    Very poor prognosis       Enoc Rojas MD 4/28/2022 7:26 AM

## 2022-04-28 NOTE — PROGRESS NOTES
RT Inhaler-Nebulizer Bronchodilator Protocol Note    There is a bronchodilator order in the chart from a provider indicating to follow the RT Bronchodilator Protocol and there is an Initiate RT Inhaler-Nebulizer Bronchodilator Protocol order as well (see protocol at bottom of note). CXR Findings:  XR CHEST PORTABLE    Result Date: 4/28/2022  Stable chest.     XR CHEST PORTABLE    Result Date: 4/27/2022  Endotracheal tube tip projects at the mid intrathoracic trachea. Otherwise stable chest.       The findings from the last RT Protocol Assessment were as follows:   History Pulmonary Disease: Chronic pulmonary disease  Respiratory Pattern: Mild dyspnea at rest, irregular pattern, or RR 21-25 bpm  Breath Sounds: Inspiratory and expiratory or bilateral wheezing and/or rhonchi  Cough: Weak, productive  Indication for Bronchodilator Therapy: Decreased or absent breath sounds  Bronchodilator Assessment Score: 14    Aerosolized bronchodilator medication orders have been revised according to the RT Inhaler-Nebulizer Bronchodilator Protocol below. Respiratory Therapist to perform RT Therapy Protocol Assessment initially then follow the protocol. Repeat RT Therapy Protocol Assessment PRN for score 0-3 or on second treatment, BID, and PRN for scores above 3. No Indications - adjust the frequency to every 6 hours PRN wheezing or bronchospasm, if no treatments needed after 48 hours then discontinue using Per Protocol order mode. If indication present, adjust the RT bronchodilator orders based on the Bronchodilator Assessment Score as indicated below. Use Inhaler orders unless patient has one or more of the following: on home nebulizer, not able to hold breath for 10 seconds, is not alert and oriented, cannot activate and use MDI correctly, or respiratory rate 25 breaths per minute or more, then use the equivalent nebulizer order(s) with same Frequency and PRN reasons based on the score.   If a patient is on this medication at home then do not decrease Frequency below that used at home. 0-3 - enter or revise RT bronchodilator order(s) to equivalent RT Bronchodilator order with Frequency of every 4 hours PRN for wheezing or increased work of breathing using Per Protocol order mode. 4-6 - enter or revise RT Bronchodilator order(s) to two equivalent RT bronchodilator orders with one order with BID Frequency and one order with Frequency of every 4 hours PRN wheezing or increased work of breathing using Per Protocol order mode. 7-10 - enter or revise RT Bronchodilator order(s) to two equivalent RT bronchodilator orders with one order with TID Frequency and one order with Frequency of every 4 hours PRN wheezing or increased work of breathing using Per Protocol order mode. 11-13 - enter or revise RT Bronchodilator order(s) to one equivalent RT bronchodilator order with QID Frequency and an Albuterol order with Frequency of every 4 hours PRN wheezing or increased work of breathing using Per Protocol order mode. Greater than 13 - enter or revise RT Bronchodilator order(s) to one equivalent RT bronchodilator order with every 4 hours Frequency and an Albuterol order with Frequency of every 2 hours PRN wheezing or increased work of breathing using Per Protocol order mode.          Electronically signed by Major Saha RCP on 4/28/2022 at 7:38 PM

## 2022-04-29 NOTE — PROGRESS NOTES
EOS report given to Reddy Mejia RN. Changes overnight:  · Vaso weaned off around midnight. Dobutamine and ketamine unchanged. · BP tolerating fluid removal much better. · TF residual started out high (300 ml), but slowly began trending down throughout the night. · Colostomy output more thick/brown now, rather than watery and green/yellow. No other changes noted overnight. Pt stable at this time, care transferred.

## 2022-04-29 NOTE — PROGRESS NOTES
Vancomycin Day # 7  Current dose = Pulse dosing per daily vancomycin levels. BUN/SRCR 26/0.6     WBC   10.1            Tmax 98.1  Vancomycin random level this am = 15.7 mcg/ml  Will give vancomycin 1000 x1 dose today and recheck random level tomorrow am.  Pharmacy will continue to obtain daily random vancomycin levels and redose as appropriate.

## 2022-04-29 NOTE — PROGRESS NOTES
04/29/22 0328   Patient Observation   Pulse 97   Resp 21   SpO2 92 %   Breath Sounds   Right Upper Lobe Rhonchi   Right Middle Lobe Rhonchi   Right Lower Lobe Diminished   Left Upper Lobe Rhonchi   Left Lower Lobe Diminished   Airway Clearance   Suction Oral;ET Tube   Suction Device Inline suction catheter   Vent Information   $Ventilation $Subsequent Day   Vent Settings   FiO2  45 %   Resp Rate (Set) 18 bmp   Vt (Set, mL) 490 mL   PEEP/CPAP (cmH2O) 5   I:E Ratio 1:1.7   Trigger Sensitivity Flow (L/min) 3 L/min   Humidification 98.6 °F (37 °C)   Heater Temperature 98.4 °F (36.9 °C)   Vent Patient Data (Readings)   Resp Rate Observed 23   Vt (Observed, mL) 496 mL   Minute Ventilation (l/min) 11.4 l/min   I:E Ratio 1:2.20   PIP Observed (cm H2O) 28 cm H2O   Plateau Pressure (cm H2O) 29 cm H2O   Vent Alarm Settings   High Pressure  40 cmH2O   RR High 40 br/min   Vent Observations - Device Integration   Vt Exhaled 490 mL   Rate Measured 20 br/min   Minute Volume 10.2 Liters   Peak Flow 55 L/min   Pressure Support 0 cmH20   Peak Inspiratory Pressure 26 cmH2O   Sensitivity 3   High Peep/I Pressure 0   I Time/ I Time % 0 s   Low Minute Volume Alarm 4 L/min   Mean Airway Pressure 13 cmH20   Additional Respiratory Assessments   Position Semi-Mejía's   Humidification Source Heated wire   Humidification Temp 37   Circuit Condensation Drained   ETT (adult)   Placement Date/Time: 04/22/22 0945   Present on Admission/Arrival: No  Placed By: Licensed provider  Placement Verified By: Chest X-ray  Preoxygenation: Yes  Mask Ventilation: Mask ventilation not attempted (0)  Technique: Rapid sequence;Direct laryng. ..    Secured At 26 cm   Measured From Lips   ETT Placement Left   Secured By Commercial tube simon   Site Assessment Dry

## 2022-04-29 NOTE — FLOWSHEET NOTE
04/29/22 8322   Encounter Summary   Encounter Overview/Reason  Spiritual/Emotional Needs   Service Provided For: Family   Referral/Consult From: [address] Parent; Children   Complexity of Encounter Low   Begin Time 0925   End Time  0940   Total Time Calculated 15 min   Spiritual/Emotional needs   Type Spiritual Support   Assessment/Intervention/Outcome   Assessment Coping   Intervention Active listening;Sustaining Presence/Ministry of presence;Prayer (assurance of)/Bernard   Outcome Expressed Gratitude      rounding. Pt intubated/sedated, yet mother and daughter at bedside. Brief conversation and prayer with family for pt and for them.
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
## 2022-04-29 NOTE — PROGRESS NOTES
Admit: 4/21/2022    Name:  Breana Gage  Room:  362/6587-20  MRN:    [id number]    Critical Care Daily Progress Note for 4/29/2022       A 61-year-old male with a history of type 2 diabetes, end-stage renal disease on hemodialysis presented with septic shock, hypotension and high fevers. Admitted to the ICU. Placed on multiple pressors. Since admission has had progressive septic shock and worsening respiratory failure. In the ICU he was intubated and started on  mechanical ventilation    Interval History:     Intubated this morning the ICU. CRRT being initiated. Currently on Levophed, vasopressin and dobutamine    4/23  Continues to be on the vent  On Levophed, vasopressin, epi and  dobutamine  CRRT was held for about 6 hours because of access issues  Back on CRRT  Continues to run high fevers    4/24  SBT for 2 hours. Did well. Now off epi. Continues to be on Levophed, vasopressin and dobutamine. On CRRT. Fevers +    4/25-seen in the ICU for septic shock. On vasopressors. Sedated on the ventilator. CRRT-goal of 50 to 75 cc/h of fluid removal.    4/26-patient is awake and alert on the ventilator. He is afebrile. Tolerating CRRT. Blood pressure still low normal.  On vasopressors. 4/28- did not tolerate trying to decrease Dobutamine. Dropped his BP. Still on levophed and vasopressin. Remains on CRRT, sedated with ketamine, he is awake and nodding head for answers    4/29- he is off vasopressors. Still on Dobutamine. Ongoing CRRT. Trying to keep negative fluid balance of 50 cc/hr.        Scheduled Meds:   sennosides-docusate sodium  2 tablet Oral BID    insulin glargine  35 Units SubCUTAneous Nightly    midodrine  10 mg Oral TID WC    insulin glargine  55 Units SubCUTAneous QAM    insulin lispro  0-18 Units SubCUTAneous Q4H    vancomycin (VANCOCIN) intermittent dosing (placeholder)   Other RX Placeholder    cefTRIAXone (ROCEPHIN) IV  1,000 mg IntraVENous Q24H    carboxymethylcellulose PF  1 drop Both Eyes Q4H    And    artificial tears   Both Eyes Q4H    chlorhexidine  15 mL Mouth/Throat BID    pantoprazole  40 mg IntraVENous Daily    ipratropium-albuterol  1 ampule Inhalation Q4H    menthol-zinc oxide   Topical Daily    apixaban  2.5 mg Oral BID    aspirin  81 mg Oral Nightly    [Held by provider] insulin lispro  7 Units SubCUTAneous TID AC    senna  1 tablet Oral Nightly    sodium chloride flush  5-40 mL IntraVENous 2 times per day       Continuous Infusions:   DOBUTamine (DOBUTREX) 2 mg/mL infusion 5 mcg/kg/min (22 0813)    dextrose      vasopressin (Septic Shock) infusion Stopped (22 0008)    ketamine (KETALAR) infusion 0.2 mg/kg/hr (2235)    dextrose      prismaSATE BGK 4/2.5 500 mL/hr at 22 0424    prismaSATE BGK 4/2.5 500 mL/hr at 22 0420    prismaSATE BGK 4/2.5 500 mL/hr at 22 0424    norepinephrine Stopped (22 1606)    sodium chloride Stopped (22 0946)       PRN Meds:  traZODone, ipratropium-albuterol, glucose, glucagon (rDNA), dextrose, dextrose bolus (hypoglycemia) **OR** dextrose bolus (hypoglycemia), glucagon (rDNA), dextrose, dextrose bolus (hypoglycemia) **OR** dextrose bolus (hypoglycemia), potassium chloride, magnesium sulfate, calcium gluconate **OR** calcium gluconate **OR** calcium gluconate **OR** calcium gluconate, sodium phosphate IVPB **OR** sodium phosphate IVPB **OR** sodium phosphate IVPB **OR** sodium phosphate IVPB, glucose, midazolam, fentanNYL, sodium chloride flush, sodium chloride, ondansetron **OR** ondansetron, polyethylene glycol, acetaminophen **OR** acetaminophen, perflutren lipid microspheres            Objective:     Temp  Av °F (36.7 °C)  Min: 96.8 °F (36 °C)  Max: 99.6 °F (37.6 °C)  Pulse  Av.4  Min: 85  Max: 108  BP  Min: 74/60  Max: 116/100  SpO2  Av.2 %  Min: 91 %  Max: 100 %  FiO2   Av %  Min: 45 %  Max: 45 %  Patient Vitals for the past 4 hrs:   BP Temp Temp src Pulse Resp SpO2   04/29/22 1000 (!) 96/55 -- -- 98 30 96 %   04/29/22 0900 (!) 94/59 97.9 °F (36.6 °C) Bladder 101 (!) 31 96 %   04/29/22 0857 -- -- -- 97 23 97 %   04/29/22 0800 84/67 -- -- 94 20 98 %   04/29/22 0700 (!) 87/57 97.3 °F (36.3 °C) Bladder 91 16 97 %         Intake/Output Summary (Last 24 hours) at 4/29/2022 1019  Last data filed at 4/29/2022 0900  Gross per 24 hour   Intake 3584 ml   Output 4717 ml   Net -1133 ml       Physical Exam:    General appearance:  middle aged male on mild sedation More awake and alert. Intubated  HEENT: NAD, oral ETT and OG noted   Neck: Supple, . No jugular venous distention. Trachea midline. Respiratory: Diminished breath sounds bilaterally  Cardiovascular: S 1 s2 heard, Tachycardic  Right chest HD catheter and Port noted  Abdomen: Soft, non-tender, non-distended with normal bowel sounds. Colostomy noted in left LQ  Musculoskeletal: Right BKA with stump healthy. Superficial skin ulcers with no active infection noted  Left LE edema with superficial ulceration noted   Skin: see Epic wound pictures, chronic exposed hardware in lower thoracic and lumbar region   Neurologic:  Able to follow simple commands.       Lab Data:  CBC:   Recent Labs     04/27/22 0318 04/28/22  0315 04/29/22  0604   WBC 19.7* 10.1 10.1   RBC 4.12* 3.93* 3.60*   HGB 10.9* 10.5* 9.7*   HCT 34.6* 32.7* 30.5*   MCV 84.1 83.3 84.5   RDW 18.7* 18.8* 18.9*   PLT 99* 83* 85*     BMP:   Recent Labs     04/28/22 2032 04/29/22 0211 04/29/22  0825   * 130* 131*   K 3.4* 3.9 3.7   CL 95* 95* 94*   CO2 23 25 24   PHOS 2.2* 1.9* 2.1*   BUN 26* 26* 25*   CREATININE 0.7* 0.6* 0.6*     BNP: No results for input(s): BNP in the last 72 hours. PT/INR: No results for input(s): PROTIME, INR in the last 72 hours. APTT:No results for input(s): APTT in the last 72 hours. CARDIAC ENZYMES:   No results for input(s): CKMB, CKMBINDEX, TROPONINI in the last 72 hours.     Invalid input(s): CKTOTAL;3  FASTING LIPID PANEL:  Lab Results   Component Value Date    CHOL 80 01/07/2022    HDL 30 01/07/2022    HDL 38 10/04/2011    TRIG 154 01/07/2022     LIVER PROFILE:   No results for input(s): AST, ALT, ALB, BILIDIR, BILITOT, ALKPHOS in the last 72 hours. Cultures    4/21/2022 blood culture 2 out of 2 Enterococcus faecalis and strep pyogenes  Influenza A and B negative  Urine positive Enterococcus and E. Coli  COVID-19 negative     XR CHEST PORTABLE   Final Result   Low volume study with bilateral atelectasis or airspace disease, similar to   prior, with persistent small pleural effusions. XR CHEST PORTABLE   Final Result   Stable chest.         XR CHEST PORTABLE   Final Result   Endotracheal tube tip projects at the mid intrathoracic trachea. Otherwise   stable chest.         XR CHEST PORTABLE   Final Result   Suboptimal examination due to patient rotation. The chest appears stable. XR CHEST PORTABLE   Final Result   Endotracheal tube tip projects at the thoracic inlet. Otherwise stable chest.         XR CHEST PORTABLE   Final Result   Stable chest.         XR CHEST PORTABLE   Final Result   Stable endotracheal tube located approximately 3.3 cm above the quintin. Low lung volumes. Suspected small right pleural effusion. XR CHEST PORTABLE   Final Result   Endotracheal tube in satisfactory position above the quintin      Bibasilar hypoaeration persist         CT ABDOMEN PELVIS W IV CONTRAST Additional Contrast? None   Final Result   1. Moderate amount of ascites with 3rd spacing including edematous changes   throughout the abdomen and anasarca. 2. Delgado in place. Diffuse bladder wall thickening. Correlate for   underlying cystitis. 3. No acute bowel pathology. Mild gastric distension. Diverticulosis with   no acute features. 4. Mild renal cortical scarring and asymmetric right renal atrophy, stable. No hydronephrosis.          CT CHEST PULMONARY EMBOLISM W CONTRAST   Final Result   1. No evidence of acute pulmonary embolism. There is some thickening and   mild irregularity in the pulmonary arteries in the left lower lobe which may   represent chronic pulmonary embolism or secondary appearance to inflammation. No evidence of aortic aneurysm or dissection. 2. Moderate right effusion and right lower lobe atelectatic and/or   consolidative changes. Pneumonia is likely. There is severe loss of volume   in the right lung. Trace left effusion and left lower lobe atelectatic   changes are seen. 3. Moderate ascites around the spleen and liver. XR CHEST PORTABLE   Final Result   Low lung volumes. Bilateral pleural effusions with bibasilar volume loss,   right greater than left. No overt failure. Assessment & Plan:     Principal Problem:    Septic shock ([address]  Active Problems:    Cardiogenic shock (HCC)    Atelectasis of right lower lobe    Bacteremia    Acute cystitis with hematuria    Streptococcal toxic shock syndrome (HCC)    Pneumonia due to infectious organism    Bullous cellulitis of right thigh    Hyperkalemia    Bacteremia due to Streptococcus pyogenes (HCC)    Anasarca associated with disorder of kidney    ESRD on dialysis [facility])  Resolved Problems:    * No resolved hospital problems. *         Septic shock. Enterococcus faecalis and strep pyogenes bacteremia. Right lower extremity cellulitis  Chronic pressure ulcerson the back with exposed hardware     Source could be HD line vs exposed hardware  Patient was on vancomycin, Merrem and clindamycin. Presently changed to clindamycin and ceftriaxone per ID. Also on Vanc. Off Merrem. ID managing this. ID will stop Cleocin today. Improving wbc and sepsis  Repeat cx remain neg    Severe hypotensive shock  off Levophed, vasopressin but still on dobutamine(low EF). Did not tolerate decreasing Dobutamine.     on hydrocortisone for shock  Critical care managing    End-stage renal disease on hemodialysis. Nephrology consulted  Initiated on CRRT   - off pressors. Trying to keep negative fluid balance. Acute hypoxic resp failure  Healthcare associated pneumonia. Intubated in the ICU on 4/22. Antibiotics as above. Chronic systolic congestive heart failure, EF 25 to 30%. Cardiology consulted  Hold home medications given hypotension  Currently on dobutamine and multiple pressors    Acute metabolic encephalopathy resolved. Secondary to septic shock  Improving   Able to follow simple commands    Type 2 diabetes.   Lantus insulin and sliding scale insulin    History of DVT  Continue Eliquis      Full code  On TF   DVT prophylaxis-Eliquis  GI prophylaxis-Protonix    Very poor prognosis       Chica Arnett MD 4/29/2022 10:19 AM

## 2022-04-29 NOTE — PROGRESS NOTES
RT Nebulizer Bronchodilator Protocol Note    There is a bronchodilator order in the chart from a provider indicating to follow the RT Bronchodilator Protocol and there is an Initiate RT Bronchodilator Protocol order as well (see protocol at bottom of note). CXR Findings:  XR CHEST PORTABLE    Result Date: 4/29/2022  Low volume study with bilateral atelectasis or airspace disease, similar to prior, with persistent small pleural effusions. XR CHEST PORTABLE    Result Date: 4/28/2022  Stable chest.       The findings from the last RT Protocol Assessment were as follows:  Smoking: (P) Chronic pulmonary disease  Respiratory Pattern: (P) Dyspnea on exertion or RR 21-25 bpm  Breath Sounds: (P) Slightly diminished and/or crackles  Cough: (P) Unable to generate effective cough  Indication for Bronchodilator Therapy: (P) Decreased or absent breath sounds  Bronchodilator Assessment Score: (P) 10    Aerosolized bronchodilator medication orders have been revised according to the RT Nebulizer Bronchodilator Protocol below. Respiratory Therapist to perform RT Therapy Protocol Assessment initially then follow the protocol. Repeat RT Therapy Protocol Assessment PRN for score 0-3 or on second treatment, BID, and PRN for scores above 3. No Indications - adjust the frequency to every 6 hours PRN wheezing or bronchospasm, if no treatments needed after 48 hours then discontinue using Per Protocol order mode. If indication present, adjust the RT bronchodilator orders based on the Bronchodilator Assessment Score as indicated below. If a patient is on this medication at home then do not decrease Frequency below that used at home. 0-3 - enter or revise RT bronchodilator order(s) to equivalent RT Bronchodilator order with Frequency of every 4 hours PRN for wheezing or increased work of breathing using Per Protocol order mode.        4-6 - enter or revise RT Bronchodilator order(s) to two equivalent RT bronchodilator orders with one order with BID Frequency and one order with Frequency of every 4 hours PRN wheezing or increased work of breathing using Per Protocol order mode. 7-10 - enter or revise RT Bronchodilator order(s) to two equivalent RT bronchodilator orders with one order with TID Frequency and one order with Frequency of every 4 hours PRN wheezing or increased work of breathing using Per Protocol order mode. 11-13 - enter or revise RT Bronchodilator order(s) to one equivalent RT bronchodilator order with QID Frequency and an Albuterol order with Frequency of every 4 hours PRN wheezing or increased work of breathing using Per Protocol order mode. Greater than 13 - enter or revise RT Bronchodilator order(s) to one equivalent RT bronchodilator order with every 4 hours Frequency and an Albuterol order with Frequency of every 2 hours PRN wheezing or increased work of breathing using Per Protocol order mode. RT to enter RT Home Evaluation for COPD & MDI Assessment order using Per Protocol order mode.     Electronically signed by Henry Oliveira RCP on 4/29/2022 at 12:44 PM
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
## 2022-04-29 NOTE — PROGRESS NOTES
RT Inhaler-Nebulizer Bronchodilator Protocol Note    There is a bronchodilator order in the chart from a provider indicating to follow the RT Bronchodilator Protocol and there is an Initiate RT Inhaler-Nebulizer Bronchodilator Protocol order as well (see protocol at bottom of note). CXR Findings:  XR CHEST PORTABLE    Result Date: 4/29/2022  Low volume study with bilateral atelectasis or airspace disease, similar to prior, with persistent small pleural effusions. XR CHEST PORTABLE    Result Date: 4/28/2022  Stable chest.       The findings from the last RT Protocol Assessment were as follows:   History Pulmonary Disease: Chronic pulmonary disease  Respiratory Pattern: Dyspnea on exertion or RR 21-25 bpm  Breath Sounds: Slightly diminished and/or crackles  Cough: Weak, productive  Indication for Bronchodilator Therapy: Decreased or absent breath sounds  Bronchodilator Assessment Score: 8    Aerosolized bronchodilator medication orders have been revised according to the RT Inhaler-Nebulizer Bronchodilator Protocol below. Respiratory Therapist to perform RT Therapy Protocol Assessment initially then follow the protocol. Repeat RT Therapy Protocol Assessment PRN for score 0-3 or on second treatment, BID, and PRN for scores above 3. No Indications - adjust the frequency to every 6 hours PRN wheezing or bronchospasm, if no treatments needed after 48 hours then discontinue using Per Protocol order mode. If indication present, adjust the RT bronchodilator orders based on the Bronchodilator Assessment Score as indicated below. Use Inhaler orders unless patient has one or more of the following: on home nebulizer, not able to hold breath for 10 seconds, is not alert and oriented, cannot activate and use MDI correctly, or respiratory rate 25 breaths per minute or more, then use the equivalent nebulizer order(s) with same Frequency and PRN reasons based on the score.   If a patient is on this medication at home then do not decrease Frequency below that used at home. 0-3 - enter or revise RT bronchodilator order(s) to equivalent RT Bronchodilator order with Frequency of every 4 hours PRN for wheezing or increased work of breathing using Per Protocol order mode. 4-6 - enter or revise RT Bronchodilator order(s) to two equivalent RT bronchodilator orders with one order with BID Frequency and one order with Frequency of every 4 hours PRN wheezing or increased work of breathing using Per Protocol order mode. 7-10 - enter or revise RT Bronchodilator order(s) to two equivalent RT bronchodilator orders with one order with TID Frequency and one order with Frequency of every 4 hours PRN wheezing or increased work of breathing using Per Protocol order mode. 11-13 - enter or revise RT Bronchodilator order(s) to one equivalent RT bronchodilator order with QID Frequency and an Albuterol order with Frequency of every 4 hours PRN wheezing or increased work of breathing using Per Protocol order mode. Greater than 13 - enter or revise RT Bronchodilator order(s) to one equivalent RT bronchodilator order with every 4 hours Frequency and an Albuterol order with Frequency of every 2 hours PRN wheezing or increased work of breathing using Per Protocol order mode.          Electronically signed by Mia Charles RCP on 4/29/2022 at 7:06 PM

## 2022-04-29 NOTE — PROGRESS NOTES
Pt placed on SBT 12/5, FiO2 45%           04/29/22 0857   Patient Observation   Pulse 97   Resp 23   SpO2 97 %   Vent Settings   FiO2  45 %   Resp Rate (Set) 0 bmp   Vt (Set, mL) 0 mL   PEEP/CPAP (cmH2O) 5   Vent Patient Data (Readings)   I:E Ratio 1:1.40   Plateau Pressure (cm H2O) 27 cm H2O   Vent Alarm Settings   High Pressure  40 cmH2O   RR High 40 br/min   Vent Observations - Device Integration   Vt Exhaled 238 mL   Rate Measured 35 br/min   Minute Volume 8.75 Liters   Peak Flow 0 L/min   Pressure Support 5 cmH20   Peak Inspiratory Pressure 11 cmH2O   Sensitivity 3   High Peep/I Pressure 0   I Time/ I Time % 0 s   Low Minute Volume Alarm 4 L/min   Mean Airway Pressure 7.8 cmH20

## 2022-04-29 NOTE — PROGRESS NOTES
90 ml IV Ketamine solution wasted with Angelita FLOWERS .     Witnessed Ketamine waste Electronically signed by Libby Pena RN on 4/29/2022 at 6:26 PM

## 2022-04-29 NOTE — PROGRESS NOTES
04/29/22 1901   Patient Observation   Pulse 88   Resp 20   SpO2 97 %   Airway Clearance   Suction ET Tube;Oral   Suction Device Roberto; Inline suction catheter   Sputum Method Obtained Endotracheal   Sputum Amount Small   Sputum Color/Odor Yellow; White   Sputum Consistency Thick   Skin Assessment   Skin Assessment Redness (see comment/note)  (lower lip towel placed to support tube)   Skin Protection for O2 Device Yes   Location Cheek   Interventions   (towel placed to support tube)   Vent Information   Vent Mode AC/VC   Vent Settings   FiO2  40 %   Resp Rate (Set) 18 bmp   Vt (Set, mL) 490 mL   PEEP/CPAP (cmH2O) 5   I:E Ratio 1:1.4   Trigger Sensitivity Flow (L/min) 3 L/min   Humidification 98.6 °F (37 °C)   Heater Temperature 98.4 °F (36.9 °C)   Vent Patient Data (Readings)   Resp Rate Observed 23   Vt (Observed, mL) 481 mL   Minute Ventilation (l/min) 10.9 l/min   I:E Ratio 1:2.40   PIP Observed (cm H2O) 28 cm H2O   Plateau Pressure (cm H2O) 30 cm H2O   Vent Alarm Settings   High Pressure  40 cmH2O   Ve Max 20   Ve Min 4   Vt Low  300 mL   RR High 40 br/min   Apnea (Secs) 20 secs   Ambu Bag With Mask At Bedside Yes   Vent Observations - Device Integration   Vt Exhaled 520 mL   Rate Measured 19 br/min   Minute Volume 9.34 Liters   Peak Flow 55 L/min   Pressure Support 0 cmH20   Peak Inspiratory Pressure 30 cmH2O   Sensitivity 3   High Peep/I Pressure 0   I Time/ I Time % 0 s   Low Minute Volume Alarm 4 L/min   Mean Airway Pressure 14 cmH20   Additional Respiratory Assessments   Position Semi-Mejía's   Humidification Source Heated wire   Humidification Temp 37   Circuit Condensation Drained   Subglottic Suction Done Yes   ETT (adult)   Placement Date/Time: 04/22/22 0945   Present on Admission/Arrival: No  Placed By: Licensed provider  Placement Verified By: Chest X-ray  Preoxygenation: Yes  Mask Ventilation: Mask ventilation not attempted (0)  Technique: Rapid sequence;Direct laryng. ..    Secured At 26 cm Measured From Lips   ETT Placement Right   Secured By Commercial tube simon   Site Assessment Dry
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
## 2022-04-29 NOTE — PROGRESS NOTES
FiO2 decraesed to 40%         04/29/22 1140   Patient Observation   Pulse 93   Resp 20   SpO2 99 %   Breath Sounds   Right Upper Lobe Clear   Right Middle Lobe Clear   Right Lower Lobe Diminished   Left Upper Lobe Clear   Left Lower Lobe Diminished   Vent Settings   FiO2  40 %   Resp Rate (Set) 18 bmp   Vt (Set, mL) 490 mL   PEEP/CPAP (cmH2O) 5   Heater Temperature 98.6 °F (37 °C)   Vent Patient Data (Readings)   I:E Ratio 1:2.10   Plateau Pressure (cm H2O) 27 cm H2O   Vent Alarm Settings   High Pressure  40 cmH2O   RR High 40 br/min   Apnea (Secs) 20 secs   Ambu Bag With Mask At Bedside Yes   Vent Observations - Device Integration   Vt Exhaled 521 mL   Rate Measured 20 br/min   Minute Volume 10.1 Liters   Peak Flow 55 L/min   Pressure Support 0 cmH20   Peak Inspiratory Pressure 27 cmH2O   Sensitivity 3   High Peep/I Pressure 0   I Time/ I Time % 0 s   Low Minute Volume Alarm 4 L/min   Mean Airway Pressure 12 cmH20   Additional Respiratory Assessments   Position Semi-Mejía's   Humidification Source Heated wire   Humidification Temp 37   Subglottic Suction Done Yes   ETT (adult)   Placement Date/Time: 04/22/22 0945   Present on Admission/Arrival: No  Placed By: Licensed provider  Placement Verified By: Chest X-ray  Preoxygenation: Yes  Mask Ventilation: Mask ventilation not attempted (0)  Technique: Rapid sequence;Direct laryng. ..    Secured At 26 cm   Measured From Lips   ETT Placement Right   Secured By Commercial tube simon   Site Assessment Dry   Ventilator Associated Pneumonia Bundle   Oral Care Completed Yes   Oral Care Performed Mouth suctioned   Oral Suctioning Yes   ETT/Trach Suctioning No
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
## 2022-04-29 NOTE — PROGRESS NOTES
Dr Dominguez Rowley reached, will restart IV Levophed,continue to titrate IV Dobutamine off. F/U with Dr. Dominguez Rowley if Levophed IV dose reaches 7 mcg/min to review with Cardiology.

## 2022-04-29 NOTE — PROGRESS NOTES
Family here at bedside to visit. POC, heart function reviewed with daughter and mother. Questions reviewed. Son on phone during conversation. Alert, responds normally to family. SBT continuing.

## 2022-04-29 NOTE — PROGRESS NOTES
Shift assessment completed, see flow sheet. Medications administered per MAR. SpO2 100% on 45% and +5. Bilateral LS dimnished with rhonchi noted. Small amount of thick, creamy secretions suctioned via ETT. RASS -2, opens eyes and nods appropriately to verbal stimulus. Follows commands in head and BUE. Ketamine infusing at 0.4 mg/kg/hr. Vasopressin infusing at 0.04 units/min. Dobutamine infusing at 5 mcg/kg/min. PIV x3, VC, and port in place and functioning appropriately, dressings C/D/I.    CRRT running with a goal to remove 50 ml/hr per nephrology. Pt tolerating well, levophed weaned off on previous shift. VC functioning well w/o access alarms so far this shift. Tube feed infusing at goal rate and pt tolerating. Residuals on higher side but < 500, TF continues. Delgado catheter in place draining dark, britney urine with sediment. Output very minimal d/t hx CKD/HD and currently on CRRT. Colostomy in place draining clear, green/yellow, watery output. Pt's mother updated. All lines and monitoring devices remain in place. Call light within reach. Bed locked and in lowest position with alarm on. Will continue to monitor.

## 2022-04-29 NOTE — PROGRESS NOTES
SBT started, TF off. .  Alert, follows commands, RASS 0. Dr Arslan Prather rounding at bedside, will trial for 2 hours and then return to [facility] vent mode. Status, meds, labs reviewed. Senekot ordered BID, colace discontinued. Lantus increased to 35 units PM dose. Will titrate  IV Dobutamine drip as able. BPs marginal currently MAP 60-65. Levophed remains off. ETT/OG secured, langley patent with scant amount tea colored urine in tubing. CRRT per orders with PFR goal 50 ml/hourly. ALL IV lines intact, dsgs WNL. ALL ICU monitoring leads in place. See flowsheet /eMAR data.

## 2022-04-29 NOTE — CARE COORDINATION
INTERDISCIPLINARY PLAN OF CARE CONFERENCE    Date/Time: 4/29/2022 8:54 AM  Completed by: CONRAD Hilton   Case Management    Patient Name:  Jessica Posadas  YOB: 1965  Admitting Diagnosis: Hyperkalemia [E87.5]  Hypoglycemia [E16.2]  ESRD on dialysis ([facility] [address] [N18.6, Z99.2]  Acute cystitis with hematuria [N30.01]  Septic shock ([facility] [address] [A41.9, R65.21]  Sepsis ([facility] [address] [A41.9]  Pneumonia due to infectious organism, unspecified laterality, unspecified part of lung [J18.9]     Admit Date/Time:  4/21/2022  1:21 PM    Chart reviewed. Interdisciplinary team contacted or reviewed plan related to patient progress and discharge plans. Disciplines included Case Management, Nursing, and Dietitian. Current Status:Ongoing   PT/OT recommendation for discharge plan of care: n/a    Expected D/C Disposition:  Home    Discharge Plan Comments: Chart review completed. Completed Interdisciplinary rounds with ICU staff. Pt remains in the ICU on a Vent. Pt is planning on returning home on discharge with his family    CM will continue to follow and assist. Please notify CM if needs or concerns arise.     Home O2 in place on admit: Yes

## 2022-04-29 NOTE — PROGRESS NOTES
SAT started, IV Ketamine to 0.2 mcg/kg/min. Alert, RASS 0. Follows commands. Nods understanding to instructions for SAT/SBT, denies pain.

## 2022-04-29 NOTE — PROGRESS NOTES
04/28/22 2342   Patient Observation   Pulse 90   Resp 20   SpO2 100 %   Breath Sounds   Right Upper Lobe Rhonchi   Right Middle Lobe Rhonchi   Right Lower Lobe Diminished   Left Upper Lobe Rhonchi   Left Lower Lobe Diminished   Airway Clearance   Suction Oral;ET Tube   Suction Device Roberto; Inline suction catheter   Sputum Method Obtained Endotracheal   Sputum Amount Small   Sputum Color/Odor White;Yellow   Sputum Consistency Thick   Vent Settings   FiO2  45 %   Resp Rate (Set) 18 bmp   Vt (Set, mL) 490 mL   PEEP/CPAP (cmH2O) 5   I:E Ratio 1:1.7   Trigger Sensitivity Flow (L/min) 3 L/min   Humidification 98.6 °F (37 °C)   Heater Temperature 98.6 °F (37 °C)   Vent Patient Data (Readings)   Resp Rate Observed 21   Vt (Observed, mL) 511 mL   Minute Ventilation (l/min) 10.4 l/min   I:E Ratio 1:2.40   PIP Observed (cm H2O) 31 cm H2O   Plateau Pressure (cm H2O) 29 cm H2O   Vent Alarm Settings   High Pressure  40 cmH2O   Ve Max 20   Ve Min 4   Vt Low  300 mL   RR High 40 br/min   Apnea (Secs) 20 secs   Ambu Bag With Mask At Bedside Yes   Vent Observations - Device Integration   Vt Exhaled 490 mL   Rate Measured 19 br/min   Minute Volume 9.33 Liters   Peak Flow 55 L/min   Pressure Support 0 cmH20   Peak Inspiratory Pressure 33 cmH2O   Sensitivity 3   High Peep/I Pressure 0   I Time/ I Time % 0 s   Low Minute Volume Alarm 4 L/min   Mean Airway Pressure 15 cmH20   Additional Respiratory Assessments   Position Semi-Mejía's   Humidification Source Heated wire   Humidification Temp 37   Circuit Condensation Drained   Subglottic Suction Done Yes   ETT (adult)   Placement Date/Time: 04/22/22 0945   Present on Admission/Arrival: No  Placed By: Licensed provider  Placement Verified By: Chest X-ray  Preoxygenation: Yes  Mask Ventilation: Mask ventilation not attempted (0)  Technique: Rapid sequence;Direct laryng. ..    Secured At 26 cm   Measured From Lips   ETT [address] [facility] By [employer] tube simon   Site Assessment Dry

## 2022-04-29 NOTE — PROGRESS NOTES
Bedside handoff report to [facility] for transfer of care. Family at bedside, RT in for Resp tx. All ICU monitoring lines in place.

## 2022-04-30 NOTE — PROGRESS NOTES
Assessment unchanged. Pt continues on CRRT. No new orders at present time. Family remains at bedside.   Andre Worrell RN, BSN

## 2022-04-30 NOTE — PROGRESS NOTES
AM assessment completed. AM labs reviewed. Pt on CRRT removing 50 cc/hr. Delgado in place for strict I/o, colostomy wnl. PIVs WNL. R vas cath WNL. R chest port WNL. Pt awake & follows commands. TF at goal 40 cc/hr. Levophed gtt at 5 mcg. No new orders at present time.

## 2022-04-30 NOTE — PROGRESS NOTES
High risk vesicant drug infusing:  ___x_______    Multiple incompatible medications infusing:  _________    CVP Monitoring:  _________    Extremely difficult IV access challenge:  ___x_____    Continued need for central line access:  ____x______    Addressed with physician:  x________    RIGHT PATIENT, RIGHT TIME, RIGHT LINE

## 2022-04-30 NOTE — PROGRESS NOTES
04/30/22 1853   Patient Observation   Pulse 82   Resp 19   SpO2 98 %   Breath Sounds   Right Upper Lobe Rhonchi   Right Middle Lobe Rhonchi   Right Lower Lobe Diminished   Left Upper Lobe Rhonchi   Left Lower Lobe Diminished   Airway Clearance   Suction Oral;ET Tube   Suction Device Inline suction catheter;Roberto   Sputum Method Obtained Endotracheal   Sputum Amount Moderate   Sputum Color/Odor Yellow; White   Sputum Consistency Thick   Vent Information   Vent Mode AC/VC   Vent Settings   FiO2  30 %   Resp Rate (Set) 18 bmp   Vt (Set, mL) 490 mL   PEEP/CPAP (cmH2O) 5   I:E Ratio 1:2.3   Trigger Sensitivity Flow (L/min) 3 L/min   Humidification 98.6 °F (37 °C)   Heater Temperature 98.6 °F (37 °C)   Vent Patient Data (Readings)   Resp Rate Observed 20   Vt (Observed, mL) 515 mL   Minute Ventilation (l/min) 9.8 l/min   I:E Ratio 1:2.40   PIP Observed (cm H2O) 30 cm H2O   Plateau Pressure (cm H2O) 32 cm H2O   Vent Alarm Settings   High Pressure  40 cmH2O   Ve Max 20   Ve Min 4   Vt Low  300 mL   RR High 40 br/min   Apnea (Secs) 20 secs   Ambu Bag With Mask At Bedside Yes   Vent Observations - Device Integration   Vt Exhaled 482 mL   Rate Measured 21 br/min   Minute Volume 10.6 Liters   Peak Flow 55 L/min   Pressure Support 0 cmH20   Peak Inspiratory Pressure 35 cmH2O   Sensitivity 3   High Peep/I Pressure 0   I Time/ I Time % 0 s   Low Minute Volume Alarm 4 L/min   Mean Airway Pressure 16 cmH20   Additional Respiratory Assessments   Position Semi-Mejía's   Humidification Source Heated wire   Humidification Temp 37   Circuit Condensation Drained   ETT (adult)   Placement Date/Time: 04/22/22 0945   Present on Admission/Arrival: No  Placed By: Licensed provider  Placement Verified By: Chest X-ray  Preoxygenation: Yes  Mask Ventilation: Mask ventilation not attempted (0)  Technique: Rapid sequence;Direct laryng. ..    Secured At 26 cm   Measured From Lips   ETT [address] [facility] By [employer] tube simon   Site Assessment Dry
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
## 2022-04-30 NOTE — PROGRESS NOTES
EOS report given to LIONEL Mosley. Changes overnight:  · CRRT filter and machine changed around 2200. CRRT machine continued removing fluid even with fluid removal set at zero and was alarming for \"flow error\" consistently with no actual flow problem present. CRRT running w/o issue with new machine. · Pt's BP reading markedly high around 0100. New cuff used, new BP cord used, and different arm used all with similar results. Levophed was at 5 mcg/min at this time and was turned off completely and ketamine was reduced to 0.1 mg/kg/hr. PRN fentanyl administered and next BP read 59/42 (49). Levo then restarted at 6, ketamine put back at 0.2, and pt suctioned. BP stable on next reading, 126/77 (92). Occurred again around 0400. · Calcium gluconate replaced (1 g x2)  · No ISS coverage needed all night  · CHG bath, wound care, and linen change completed    Ending the shift with levophed at 5 mcg/min and ketamine at 0.1 mg/kg/hr. Vancomycin and calcium gluconate infusing also. No other changes noted overnight. Pt stable at this time, care transferred.

## 2022-04-30 NOTE — PROGRESS NOTES
RT Inhaler-Nebulizer Bronchodilator Protocol Note    There is a bronchodilator order in the chart from a provider indicating to follow the RT Bronchodilator Protocol and there is an Initiate RT Inhaler-Nebulizer Bronchodilator Protocol order as well (see protocol at bottom of note). CXR Findings:  XR CHEST PORTABLE    Result Date: 4/30/2022  Relatively stable appearance of the chest with bibasilar airspace opacities. XR CHEST PORTABLE    Result Date: 4/29/2022  Low volume study with bilateral atelectasis or airspace disease, similar to prior, with persistent small pleural effusions. The findings from the last RT Protocol Assessment were as follows:   History Pulmonary Disease: Chronic pulmonary disease  Respiratory Pattern: Mild dyspnea at rest, irregular pattern, or RR 21-25 bpm  Breath Sounds: Inspiratory and expiratory or bilateral wheezing and/or rhonchi  Cough: Weak, productive  Indication for Bronchodilator Therapy: Decreased or absent breath sounds  Bronchodilator Assessment Score: 14    Aerosolized bronchodilator medication orders have been revised according to the RT Inhaler-Nebulizer Bronchodilator Protocol below. Respiratory Therapist to perform RT Therapy Protocol Assessment initially then follow the protocol. Repeat RT Therapy Protocol Assessment PRN for score 0-3 or on second treatment, BID, and PRN for scores above 3. No Indications - adjust the frequency to every 6 hours PRN wheezing or bronchospasm, if no treatments needed after 48 hours then discontinue using Per Protocol order mode. If indication present, adjust the RT bronchodilator orders based on the Bronchodilator Assessment Score as indicated below.   Use Inhaler orders unless patient has one or more of the following: on home nebulizer, not able to hold breath for 10 seconds, is not alert and oriented, cannot activate and use MDI correctly, or respiratory rate 25 breaths per minute or more, then use the equivalent nebulizer order(s) with same Frequency and PRN reasons based on the score. If a patient is on this medication at home then do not decrease Frequency below that used at home. 0-3 - enter or revise RT bronchodilator order(s) to equivalent RT Bronchodilator order with Frequency of every 4 hours PRN for wheezing or increased work of breathing using Per Protocol order mode. 4-6 - enter or revise RT Bronchodilator order(s) to two equivalent RT bronchodilator orders with one order with BID Frequency and one order with Frequency of every 4 hours PRN wheezing or increased work of breathing using Per Protocol order mode. 7-10 - enter or revise RT Bronchodilator order(s) to two equivalent RT bronchodilator orders with one order with TID Frequency and one order with Frequency of every 4 hours PRN wheezing or increased work of breathing using Per Protocol order mode. 11-13 - enter or revise RT Bronchodilator order(s) to one equivalent RT bronchodilator order with QID Frequency and an Albuterol order with Frequency of every 4 hours PRN wheezing or increased work of breathing using Per Protocol order mode. Greater than 13 - enter or revise RT Bronchodilator order(s) to one equivalent RT bronchodilator order with every 4 hours Frequency and an Albuterol order with Frequency of every 2 hours PRN wheezing or increased work of breathing using Per Protocol order mode.          Electronically signed by Karen Rodriguez RCP on 4/30/2022 at 7:07 PM

## 2022-04-30 NOTE — PROGRESS NOTES
04/29/22 2320   Patient Observation   Pulse 89   Resp 21   SpO2 93 %   Breath Sounds   Right Upper Lobe Rhonchi   Right Middle Lobe Rhonchi   Right Lower Lobe Diminished   Left Upper Lobe Rhonchi   Left Lower Lobe Diminished   Airway Clearance   Suction ET Tube;Oral   Suction Device Inline suction catheter;Roberto   Sputum Method Obtained Endotracheal   Sputum Amount Small   Sputum Color/Odor White;Yellow   Sputum Consistency Thick   Vent Information   Vent Mode AC/VC   Vent Settings   FiO2  40 %   Resp Rate (Set) 18 bmp   Vt (Set, mL) 490 mL   PEEP/CPAP (cmH2O) 5   I:E Ratio 1:1.6   Trigger Sensitivity Flow (L/min) 3 L/min   Humidification 98.6 °F (37 °C)   Heater Temperature 96.4 °F (35.8 °C)   Vent Patient Data (Readings)   Resp Rate Observed 24   Vt (Observed, mL) 472 mL   Minute Ventilation (l/min) 11.6 l/min   I:E Ratio 1:1.90   PIP Observed (cm H2O) 27 cm H2O   Plateau Pressure (cm H2O) 30 cm H2O   Vent Alarm Settings   High Pressure  40 cmH2O   Ve Max 20   Ve Min 4   Vt Low  300 mL   RR High 40 br/min   Apnea (Secs) 20 secs   Ambu Bag With Mask At Bedside Yes   Vent Observations - Device Integration   Vt Exhaled 483 mL   Rate Measured 20 br/min   Minute Volume 9.93 Liters   Peak Flow 55 L/min   Pressure Support 0 cmH20   Peak Inspiratory Pressure 32 cmH2O   Sensitivity 3   High Peep/I Pressure 0   I Time/ I Time % 0 s   Low Minute Volume Alarm 4 L/min   Mean Airway Pressure 13 cmH20   Additional Respiratory Assessments   Position Semi-Mejía's   Humidification Source Heated wire   Humidification Temp 37   Circuit Condensation Drained   ETT (adult)   Placement Date/Time: 04/22/22 0945   Present on Admission/Arrival: No  Placed By: Licensed provider  Placement Verified By: Chest X-ray  Preoxygenation: Yes  Mask Ventilation: Mask ventilation not attempted (0)  Technique: Rapid sequence;Direct laryng. ..    Secured At 25 cm   Measured From Lips   ETT [address] [facility],[address] By [employer] tube simon   Site Assessment Dry

## 2022-04-30 NOTE — PROGRESS NOTES
RT Inhaler-Nebulizer Bronchodilator Protocol Note    There is a bronchodilator order in the chart from a provider indicating to follow the RT Bronchodilator Protocol and there is an Initiate RT Inhaler-Nebulizer Bronchodilator Protocol order as well (see protocol at bottom of note). CXR Findings:  XR CHEST PORTABLE    Result Date: 4/30/2022  Relatively stable appearance of the chest with bibasilar airspace opacities. XR CHEST PORTABLE    Result Date: 4/29/2022  Low volume study with bilateral atelectasis or airspace disease, similar to prior, with persistent small pleural effusions. The findings from the last RT Protocol Assessment were as follows:   History Pulmonary Disease: (P) Chronic pulmonary disease  Respiratory Pattern: (P) Dyspnea on exertion or RR 21-25 bpm  Breath Sounds: (P) Slightly diminished and/or crackles  Cough: (P) Weak, productive  Indication for Bronchodilator Therapy: (P) Decreased or absent breath sounds  Bronchodilator Assessment Score: (P) 8    Aerosolized bronchodilator medication orders have been revised according to the RT Inhaler-Nebulizer Bronchodilator Protocol below. Respiratory Therapist to perform RT Therapy Protocol Assessment initially then follow the protocol. Repeat RT Therapy Protocol Assessment PRN for score 0-3 or on second treatment, BID, and PRN for scores above 3. No Indications - adjust the frequency to every 6 hours PRN wheezing or bronchospasm, if no treatments needed after 48 hours then discontinue using Per Protocol order mode. If indication present, adjust the RT bronchodilator orders based on the Bronchodilator Assessment Score as indicated below.   Use Inhaler orders unless patient has one or more of the following: on home nebulizer, not able to hold breath for 10 seconds, is not alert and oriented, cannot activate and use MDI correctly, or respiratory rate 25 breaths per minute or more, then use the equivalent nebulizer order(s) with same Frequency and PRN reasons based on the score. If a patient is on this medication at home then do not decrease Frequency below that used at home. 0-3 - enter or revise RT bronchodilator order(s) to equivalent RT Bronchodilator order with Frequency of every 4 hours PRN for wheezing or increased work of breathing using Per Protocol order mode. 4-6 - enter or revise RT Bronchodilator order(s) to two equivalent RT bronchodilator orders with one order with BID Frequency and one order with Frequency of every 4 hours PRN wheezing or increased work of breathing using Per Protocol order mode. 7-10 - enter or revise RT Bronchodilator order(s) to two equivalent RT bronchodilator orders with one order with TID Frequency and one order with Frequency of every 4 hours PRN wheezing or increased work of breathing using Per Protocol order mode. 11-13 - enter or revise RT Bronchodilator order(s) to one equivalent RT bronchodilator order with QID Frequency and an Albuterol order with Frequency of every 4 hours PRN wheezing or increased work of breathing using Per Protocol order mode. Greater than 13 - enter or revise RT Bronchodilator order(s) to one equivalent RT bronchodilator order with every 4 hours Frequency and an Albuterol order with Frequency of every 2 hours PRN wheezing or increased work of breathing using Per Protocol order mode. RT to enter RT Home Evaluation for COPD & MDI Assessment order using Per Protocol order mode.     Electronically signed by Dinh Brown RCP on 4/30/2022 at 5:12 PM

## 2022-04-30 NOTE — PROGRESS NOTES
Vancomycin Day # 8  Current dose = Pulse dosing per daily vancomycin levels. BUN/SRCR 26/0.6     WBC   10.1            Tmax 98.1  Vancomycin random level this am = 16.8 mcg/ml  Will give vancomycin 1000 x1 dose today and recheck random level tomorrow am.  Pharmacy will continue to obtain daily random vancomycin levels and redose as appropriate.

## 2022-04-30 NOTE — PROGRESS NOTES
04/30/22 0255   Patient Observation   Pulse 76   Resp 19   SpO2 100 %   Breath Sounds   Right Upper Lobe Rhonchi   Right Middle Lobe Rhonchi   Right Lower Lobe Diminished   Left Upper Lobe Rhonchi   Left Lower Lobe Diminished   Airway Clearance   Suction ET Tube;Oral   Suction Device Roberto; Inline suction catheter   Sputum Method Obtained Endotracheal   Sputum Amount Small   Sputum Color/Odor White;Yellow   Sputum Consistency Thick   Vent Information   Vent Mode AC/VC   Vent Settings   FiO2  30 %   Resp Rate (Set) 18 bmp   Vt (Set, mL) 490 mL   PEEP/CPAP (cmH2O) 5   I:E Ratio 1:2.1   Trigger Sensitivity Flow (L/min) 3 L/min   Humidification 98.6 °F (37 °C)   Heater Temperature 98.6 °F (37 °C)   Vent Patient Data (Readings)   Resp Rate Observed 19   Vt (Observed, mL) 489 mL   Minute Ventilation (l/min) 9.7 l/min   I:E Ratio 1:2.20   PIP Observed (cm H2O) 29 cm H2O   Plateau Pressure (cm H2O) 32 cm H2O   Vent Alarm Settings   High Pressure  40 cmH2O   Ve Max 20   Ve Min 4   Vt Low  300 mL   RR High 40 br/min   Apnea (Secs) 20 secs   Ambu Bag With Mask At Bedside Yes   Vent Observations - Device Integration   Vt Exhaled 489 mL   Rate Measured 19 br/min   Minute Volume 9.22 Liters   Peak Flow 55 L/min   Pressure Support 0 cmH20   Peak Inspiratory Pressure 31 cmH2O   Sensitivity 3   High Peep/I Pressure 0   I Time/ I Time % 0 s   Low Minute Volume Alarm 4 L/min   Mean Airway Pressure 13 cmH20   Additional Respiratory Assessments   Position Semi-Mejía's   Humidification Source Heated wire   Humidification Temp 37   ETT (adult)   Placement Date/Time: 04/22/22 0945   Present on Admission/Arrival: No  Placed By: Licensed provider  Placement Verified By: Chest X-ray  Preoxygenation: Yes  Mask Ventilation: Mask ventilation not attempted (0)  Technique: Rapid sequence;Direct laryng. ..    Secured At 25 cm   Measured From Lips   ETT Placement Left   Secured By Commercial tube simon   Site Assessment Dry

## 2022-04-30 NOTE — PLAN OF CARE
Problem: Discharge Planning  Goal: Discharge to home or other facility with appropriate resources  Outcome: Progressing     Problem: Chronic Conditions and Co-morbidities  Goal: Patient's chronic conditions and co-morbidity symptoms are monitored and maintained or improved  Outcome: Progressing     Problem: Safety - Medical Restraint  Goal: Remains free of injury from restraints (Restraint for Interference with Medical Device)  Description: INTERVENTIONS:  1. Determine that other, less restrictive measures have been tried or would not be effective before applying the restraint  2. Evaluate the patient's condition at the time of restraint application  3. Inform patient/family regarding the reason for restraint  4. Q2H: Monitor safety, psychosocial status, comfort, nutrition and hydration  Outcome: Progressing     Problem: Nutrition Deficit:  Goal: Optimize nutritional status  Outcome: Progressing     Problem: Pain  Goal: Verbalizes/displays adequate comfort level or baseline comfort level  Outcome: Progressing     Problem: Skin/Tissue Integrity  Goal: Absence of new skin breakdown  Description: 1. Monitor for areas of redness and/or skin breakdown  2. Assess vascular access sites hourly  3. Every 4-6 hours minimum:  Change oxygen saturation probe site  4. Every 4-6 hours:  If on nasal continuous positive airway pressure, respiratory therapy assess nares and determine need for appliance change or resting period.   Outcome: Progressing     Problem: Safety - Adult  Goal: Free from fall injury  Outcome: Progressing     Problem: ABCDS Injury Assessment  Goal: Absence of physical injury  Outcome: Progressing

## 2022-04-30 NOTE — PROGRESS NOTES
Shift assessment completed, see flow sheet. Medications administered per MAR. SpO2 100% on 40% and +5. Bilateral LS diminished with rhonchi noted. Moderate amount of thick, creamy secretions suctioned via ETT. RASS -1, opens eyes to voice and follows commands in BUE. Ketamine infusing at 0.2 mg/kg/hr. Levophed infusing at 5 mcg/min. CRRT running with a goal to remove 50 ml/hr. Pt tolerating well, minimal pressor use needed. PIV x3, RIJ tunneled VC, and R CW port in place and functioning appropriately, dressings C/D/I. TF infusing at goal rate and pt tolerating well. Gastric residual 35 ml. Delgado catheter in place draining cloudy, tea colored urine with minimal output. LUQ colostomy in place with watery, brown stool noted. All lines and monitoring devices remain in place. Call light within reach. Bed locked and in lowest position with alarm on. Will continue to monitor.

## 2022-04-30 NOTE — PROGRESS NOTES
HISTORY:  Dusty Gallagher is a pleasant 73 year old male who comes to clinic for 4 day follow-up status post L2-3 laminectomy. He reports the day of surgery feeling good. He walks quite a bit was able to walk upright. That night he describes pain in his knees and calves and has been struggling since that time. The doesn't have sherif weakness but reports pain at night that wakes him up. He is able to go to sleep quickly but awakens with pain in his legs. When he gets up in the morning he has pain again in his knees in his legs but he gets better as the day goes along. He continues to find it painful to get up from a chair to a stand. He doesn't have pain right in his back or in the area of his left low back that was bothering him prior to surgery.    PAST MEDICAL HISTORY:  Past Medical History:   Diagnosis Date   • Cataract    • Colon polyps     3/20/2008; 6 polyps    1/14/2014; 4 polyps   • Coronary artery disease involving native coronary artery of native heart without angina pectoris 7/27/2017    CABG 8/23/2017: Coronary artery bypass grafting x4 grafts with the left internal thoracic artery placed to left anterior descending coronary artery.  Saphenous vein graft placed sequentially side-to-side to the first diagonal branch of the left anterior descending coronary artery and end-to-side to the second obtuse marginal branch of the circumflex coronary artery.  Saphenous vein graft placed to   • Diverticulosis    • Dry eye syndrome    • ED (erectile dysfunction)     suboptimal response to oral medications like viagra   • ED (erectile dysfunction)    • Essential (primary) hypertension    • Gastroesophageal reflux disease    • Gastroesophageal reflux disease without esophagitis 7/4/2017   • Gout    • Hypertension 8/25/2017   • Lumbar disc disease    • Obesity    • S/P CABG x 4 8/23/2017   • S/P RIGHT carotid endarterectomy 8/23/2017   • Spinal stenosis    • Tinnitus of left ear 8/8/2019       PAST SURGICAL HISTORY:  Past  o2 sat 88% Estrada, RT called to end SBT.   Verónica Cosme RN, BSN Surgical History:   Procedure Laterality Date   • Arthroscopy knee medial or lat      right   • Cardiac surgery      CABG X4   • Carpal tunnel release      bilateral   • Colonoscopy  2014   • Colonoscopy diagnostic  2017   • Excise carotid body+carotid artery     • Exploration, carotid artery Right    • Knee surgery Right     right meniscus   • Vasectomy         FAMILY HISTORY:  Family History   Problem Relation Age of Onset   • Arthritis Mother    • Hypertension Mother    • Arthritis Father    • Hypertension Father    • Cancer, Prostate Father 70   • Blood Disorder Father         blood infection   • Cancer, Breast Sister    • Hypertension Sister    • Diabetes Brother    • Hypertension Brother    • Heart Brother         cabg x5    • Cancer, Breast Sister        SOCIAL HISTORY:  Social History     Tobacco Use   • Smoking status: Former Smoker     Packs/day: 0.00     Last attempt to quit: 7/3/1977     Years since quittin.1   • Smokeless tobacco: Never Used   Substance Use Topics   • Alcohol use: Yes     Alcohol/week: 8.0 standard drinks     Types: 8 Standard drinks or equivalent per week     Frequency: 4 or more times a week     Drinks per session: 3 or 4     Binge frequency: Monthly     Comment: social       MEDICATIONS:  Current Outpatient Medications   Medication Sig   • acetaminophen (TYLENOL) 500 MG tablet Take 1,000 mg by mouth every 6 hours as needed for Pain.   • HYDROcodone-acetaminophen (NORCO) 5-325 MG per tablet Take 1-2 tablets by mouth every 4 to 6 hours as needed for pain.   • Biotin 25101 MCG Tab Take 10,000 mcg by mouth daily.   • rosuvastatin (CRESTOR) 5 MG tablet Take 5 mg by mouth daily.   • pantoprazole (PROTONIX) 20 MG tablet Take 20 mg by mouth as needed.   • aspirin 81 MG tablet Take 81 mg by mouth daily.   • fish oil-omega-3 fatty acids 1000 MG CAPS 1,000 mg 3 times daily. Take 2 in the morning and 1 in the evening     No current facility-administered medications for this  visit.        ALLERGIES:  ALLERGIES:  No Known Allergies    SMOKING HISTORY:  Social History     Tobacco Use   Smoking Status Former Smoker   • Packs/day: 0.00   • Last attempt to quit: 7/3/1977   • Years since quittin.1   Smokeless Tobacco Never Used       EXAM:  Height 6' (1.829 m), weight 113.7 kg.  His incision is benign. He has appropriate strength all motor groups bilateral lower extremities. When he stands he leans forward he seems to have a hard time with that transition.      IMPRESSION:  Patient just 4 days out from surgery. He doesn't have weakness or numbness. I'm not certain what to make of his positional pain. He could have a hematoma that formed early after surgical intervention. As it is not associated with weakness or numbness I think it is okay to give it time and see if it will resolve. I will see him back in 1 week.

## 2022-05-01 NOTE — PROCEDURES
PROCEDURE:  BRONCHOSCOPY WITH BRONCHOALVEOLAR LAVAGE    Pre-procedure evaluation/H&P: Critically ill patient with respiratory failure, requires fiberoptic bronchoscopy for direct airway evaluation and to enable acquisition of quality pulmonary fluid sample for analysis.   Please see my ICU note from today for additional details    Current Facility-Administered Medications   Medication Dose Route Frequency Provider Last Rate Last Admin    insulin glargine (LANTUS) injection vial 30 Units  30 Units SubCUTAneous Nightly Jonatan Hutchinson MD        Rogers Shruti ON 5/2/2022] insulin glargine (LANTUS) injection vial 30 Units  30 Units SubCUTAneous QAM Jonatan Hutchinson MD        lidocaine (XYLOCAINE) 4 % external solution             meropenem (MERREM) 1,000 mg in sodium chloride 0.9 % 100 mL IVPB (mini-bag)  1,000 mg IntraVENous Q8H Verito Tamez PA-C 200 mL/hr at 05/01/22 1323 1,000 mg at 05/01/22 1323    sennosides-docusate sodium (SENOKOT-S) 8.6-50 MG tablet 2 tablet  2 tablet Oral BID Jonatan Hutchinson MD   2 tablet at 05/01/22 0729    carboxymethylcellulose PF (THERATEARS) 1 % ophthalmic gel 1 drop  1 drop Both Eyes Q4H PRN Jonatan Hutchinson MD        And    lubrifresh P.M. (artificial tears) ophthalmic ointment   Both Eyes Q4H PRN Jonatan Hutchinson MD   Given at 04/30/22 0756    traZODone (DESYREL) tablet 50 mg  50 mg Oral Nightly PRN Manjinder Lombardi MD        midodrine (PROAMATINE) tablet 10 mg  10 mg Oral TID WC Neena Evans MD   10 mg at 05/01/22 1254    insulin lispro (HUMALOG) injection vial 0-18 Units  0-18 Units SubCUTAneous Q4H Jonatan Hutchinson MD   3 Units at 04/30/22 2344    vancomycin (VANCOCIN) intermittent dosing (placeholder)   Other RX Placeholder Jonatan Hutchinson MD        DOBUTamine  mg in dextrose 5 % 250 mL infusion  5 mcg/kg/min (Order-Specific) IntraVENous Continuous Jonatan Hutchinson MD   Stopped at 04/29/22 1356    ipratropium-albuterol (DUONEB) nebulizer solution 1 ampule  1 ampule Inhalation Q4H PRMARINA Oliveira MD        glucose (GLUTOSE) 40 % oral gel 15 g  15 g Oral PRN Alejandra Leung MD        glucagon (rDNA) injection 1 mg  1 mg IntraMUSCular PRN Alejandra Leung MD        dextrose 5 % solution  100 mL/hr IntraVENous PRN Alejandra Leung MD        dextrose bolus (hypoglycemia) 10% 125 mL  125 mL IntraVENous PRN Leida Oliveira MD        Or    dextrose bolus (hypoglycemia) 10% 250 mL  250 mL IntraVENous PRN Leida Oliveira MD        vasopressin 20 Units in dextrose 5 % 100 mL infusion  0.04 Units/min IntraVENous Continuous Noel Capps MD   Stopped at 04/29/22 0008    chlorhexidine (PERIDEX) 0.12 % solution 15 mL  15 mL Mouth/Throat BID Alejandra Leung MD   15 mL at 05/01/22 0729    ketamine (KETALAR) 500 mg in sodium chloride 0.9 % 250 mL infusion  0.1 mg/kg/hr IntraVENous Continuous Alejandra Leung MD 11.3 mL/hr at 05/01/22 0742 0.2 mg/kg/hr at 05/01/22 0742    pantoprazole (PROTONIX) injection 40 mg  40 mg IntraVENous Daily Alejandra Leung MD   40 mg at 05/01/22 0729    glucagon (rDNA) injection 1 mg  1 mg IntraMUSCular PRN Alejandra Leung MD        dextrose 5 % solution  100 mL/hr IntraVENous PRN Alejandra Leung MD        dextrose bolus (hypoglycemia) 10% 125 mL  125 mL IntraVENous PRN Alejandra Leung MD        Or    dextrose bolus (hypoglycemia) 10% 250 mL  250 mL IntraVENous PRMARINA Leung MD        prismaSATE BGK 4/2.5 dialysis solution   Dialysis Continuous Siena Ramon  mL/hr at 05/01/22 0420 New Bag at 05/01/22 400 [address] 4/2.5 dialysis solution   Dialysis Continuous Babak Elizabeth  mL/hr at 05/01/22 0420 New Bag at 05/01/22 400 [address] 4/2.5 dialysis solution   Dialysis Continuous Babak Elizabeth  mL/hr at 05/01/22 0420 New Bag at 05/01/22 0420    potassium chloride 20 mEq/50 mL IVPB (Central Line)  20 mEq IntraVENous PRMARINA Elizabeth MD   Stopped at 04/28/22 2327    magnesium sulfate 1000 mg in dextrose 5% 100 mL IVPB  1,000 mg IntraVENous PRN Pablo Farris MD        calcium gluconate 1,000 mg in dextrose 5 % 100 mL IVPB  1,000 mg IntraVENous PRN Pablo Farris MD   Stopped at 05/01/22 1058    Or    calcium gluconate 2,000 mg in dextrose 5 % 100 mL IVPB  2,000 mg IntraVENous PRN Pablo Farris MD        Or    calcium gluconate 3,000 mg in dextrose 5 % 100 mL IVPB  3,000 mg IntraVENous PRN Pablo Farris MD        Or    calcium gluconate 4,000 mg in dextrose 5 % 100 mL IVPB  4,000 mg IntraVENous PRN Pablo Farris MD        sodium phosphate 6 mmol in sodium chloride 0.9 % 250 mL IVPB  6 mmol IntraVENous PRN Pablo Farris MD   Stopped at 05/01/22 1227    Or    sodium phosphate 12 mmol in dextrose 5 % 250 mL IVPB  12 mmol IntraVENous PRN Pablo Farris MD   Stopped at 04/29/22 2220    Or    sodium phosphate 18 mmol in dextrose 5 % 500 mL IVPB  18 mmol IntraVENous PRN Pablo Farris MD   Stopped at 04/25/22 1701    Or    sodium phosphate 24 mmol in dextrose 5 % 500 mL IVPB  24 mmol IntraVENous PRN Pablo Farris MD        ipratropium-albuterol (DUONEB) nebulizer solution 1 ampule  1 ampule Inhalation Q4H Dee Royal MD   1 ampule at 05/01/22 1147    glucose chewable tablet 16 g  4 tablet Oral PRN Guillaume Anthony MD        midazolam (VERSED) injection 2 mg  2 mg IntraVENous Q1H PRN Guillaume Anthony MD   2 mg at 05/01/22 1155    fentaNYL (SUBLIMAZE) injection 50 mcg  50 mcg IntraVENous Q1H PRN Guillaume Anthony MD   50 mcg at 05/01/22 1158    menthol-zinc oxide (CALMOSEPTINE) 0.44-20.6 % ointment   Topical Daily Jsameet Olguin MD   Given at 05/01/22 0730    norepinephrine (LEVOPHED) 16 mg in dextrose 5 % 250 mL infusion  1-100 mcg/min IntraVENous Continuous Estefaniaronal Thorpe PA-C 4.7 mL/hr at 05/01/22 1324 5 mcg/min at 05/01/22 1324    apixaban (ELIQUIS) tablet 2.5 mg  2.5 mg Oral BID Dee Royal MD   2.5 mg at 05/01/22 7153    aspirin chewable tablet 81 mg  81 mg Oral Nightly Geo Novak MD   81 mg at 04/30/22 2111    sodium chloride flush 0.9 % injection 5-40 mL  5-40 mL IntraVENous 2 times per day Geo Novak MD   10 mL at 05/01/22 0730    sodium chloride flush 0.9 % injection 5-40 mL  5-40 mL IntraVENous PRN Geo Novak MD        0.9 % sodium chloride infusion   IntraVENous PRN Geo Novak MD 5 mL/hr at 05/01/22 1242 Rate Verify at 05/01/22 1242    ondansetron (ZOFRAN-ODT) disintegrating tablet 4 mg  4 mg Oral Q8H PRN Geo Novak MD        Or    ondansetron (ZOFRAN) injection 4 mg  4 mg IntraVENous Q6H PRN Geo Novak MD        polyethylene glycol (GLYCOLAX) packet 17 g  17 g Oral Daily PRN Geo Novak MD        acetaminophen (TYLENOL) tablet 650 mg  650 mg Oral Q6H PRN Geo Novak MD   650 mg at 04/23/22 0151    Or    acetaminophen (TYLENOL) suppository 650 mg  650 mg Rectal Q6H PRN Geo Novak MD        perflutren lipid microspheres (DEFINITY) injection 1.65 mg  1.5 mL IntraVENous ONCE PRN Geo Novak MD         Allergies   Allergen Reactions    Benadryl [Diphenhydramine Hcl] Anaphylaxis     Throat swelling    Keflex [Cephalexin] Anaphylaxis     Tolerates cefdinir and ceftriaxone.  Statins      muscle aches     Morphine Anxiety     Hallucinations     Penicillins Rash     Hives     Sulfa Antibiotics Rash       EXAM:    HENT: ETT in place, Mallampati cannot be determined  Cardiovascular: Normal rate, regular rhythm, normal heart sounds. Pulmonary/Chest: No wheezes. + rhonchi. No rales. Abdominal: Soft. Bowel sounds are normal. No distension. ASA CLASS  IV. Severe Systemic Disease which is a threat to life    Sedation plan: Continue ICU sedation, as needed versed/fentanyl boluses    Post Procedure Plan: ongoing ICU care    The risks and benefits as well as alternatives to the procedure have been discussed with the family.   The next of kin understands and agrees to proceed. DESCRIPTION OF PROCEDURE & FINDINGS:  A time out was taken. IV sedation with fentanyl and versed and ketamine bolus in addition to the ketamine being used for ventilator sedation. Total time for sedation was 11 minutes, with continuous 1:1 monitoring throughout. The scope was passed with ease via the endotracheal tube. A complete airway inspection was performed. No endobronchial lesions were identified. There were copious thin purulent secretions throughout the bilateral airways. There were also areas with small mucus plugging in the RML and RLL airwaysTherapeutic aspiration was performed in the RML and RLL. Washings were obtained throughout the airways. A bronchoalveolar lavage was obtained from the RML with good return. The patient tolerated the procedure well. Recovery per ICU protocol.     FOLLOW UP:  Cultures

## 2022-05-01 NOTE — PROGRESS NOTES
breaths per minute or more, then use the equivalent nebulizer order(s) with same Frequency and PRN reasons based on the score. If a patient is on this medication at home then do not decrease Frequency below that used at home. 0-3 - enter or revise RT bronchodilator order(s) to equivalent RT Bronchodilator order with Frequency of every 4 hours PRN for wheezing or increased work of breathing using Per Protocol order mode. 4-6 - enter or revise RT Bronchodilator order(s) to two equivalent RT bronchodilator orders with one order with BID Frequency and one order with Frequency of every 4 hours PRN wheezing or increased work of breathing using Per Protocol order mode. 7-10 - enter or revise RT Bronchodilator order(s) to two equivalent RT bronchodilator orders with one order with TID Frequency and one order with Frequency of every 4 hours PRN wheezing or increased work of breathing using Per Protocol order mode. 11-13 - enter or revise RT Bronchodilator order(s) to one equivalent RT bronchodilator order with QID Frequency and an Albuterol order with Frequency of every 4 hours PRN wheezing or increased work of breathing using Per Protocol order mode. Greater than 13 - enter or revise RT Bronchodilator order(s) to one equivalent RT bronchodilator order with every 4 hours Frequency and an Albuterol order with Frequency of every 2 hours PRN wheezing or increased work of breathing using Per Protocol order mode.          Electronically signed by Geovanna Ortiz RCP on 5/1/2022 at 8:38 AM

## 2022-05-01 NOTE — PROGRESS NOTES
High risk vesicant drug infusing:  ____x______    Multiple incompatible medications infusing:  _________    CVP Monitoring:  _________    Extremely difficult IV access challenge:  ___x_____    Continued need for central line access:  ___x_______    Addressed with physician:  ___x_____    RIGHT PATIENT, RIGHT TIME, RIGHT LINE

## 2022-05-01 NOTE — PROGRESS NOTES
EOS bedside report given to LIONEL Mosley. Changes overnight:  · Levophed increased to 5 mcg/min (MAP was borderline initially) but much more stable now, back down to 4 mcg/min. · Ketamine increased to 0.2 mg/kg/hr for pt comfort. · CHG bath, wound care, langley and oral care completed. PRN fentanyl administered afterwards at pt's request.  · Blood warmer turned off around 0400 as pt's temp has steadily climbed overnight despite CRRT running & fan facing pt. · WBC jumped from 18.6 and 26.2. Tracheal aspirate sent yesterday. No other changes noted. Pt stable at this time, care transferred.
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
## 2022-05-01 NOTE — PROGRESS NOTES
Temp 95.8- bladder. Blood warmer resumed on CRRT. Pt refuses warm blanket & request fan remains on. Family remains at bedside.   Amberly Arriaga RN, BSN

## 2022-05-01 NOTE — PROGRESS NOTES
Vancomycin Day # 9  Current dose = Pulse dosing per daily vancomycin levels. BUN/SRCR 26/0.6     WBC               Tmax 98.1  Vancomycin random level this am = 18.8 mcg/ml  Will give vancomycin 750 x1 dose today and recheck random level tomorrow am.  Pharmacy will continue to obtain daily random vancomycin levels and redose as appropriate.

## 2022-05-01 NOTE — PROGRESS NOTES
Shift assessment completed, see flow sheet. Medications administered per MAR.     SpO2 95% on 30% and +5. Bilateral LS dimnished with rhonchi noted. Thick, creamy secretions suctioned via ETT.     RASS -1, opens eyes and nods appropriately to verbal stimulus. Follows commands in BUE.     Ketamine infusing at 0.1 mg/kg/hr. Levophed infusing at 4 mcg/min.     PIV x3, VC, and port in place and functioning appropriately, dressings C/D/I.     CRRT running with a goal to remove 50 ml/hr per nephrology and pt tolerating. VC functioning well w/o access issues.     Tube feed infusing at goal rate and pt tolerating. Delgado catheter in place draining cloudy, tea colored urine. UO very minimal d/t hx CKD/HD and currently on CRRT. Colostomy in place draining brown, watery stool.     Pt's mother updated on pt condition and POC.     All lines and monitoring devices remain in place. Call light within reach. Bed locked and in lowest position with alarm on. Will continue to monitor.

## 2022-05-01 NOTE — PROGRESS NOTES
RT Inhaler-Nebulizer Bronchodilator Protocol Note    There is a bronchodilator order in the chart from a provider indicating to follow the RT Bronchodilator Protocol and there is an Initiate RT Inhaler-Nebulizer Bronchodilator Protocol order as well (see protocol at bottom of note). CXR Findings:  XR CHEST PORTABLE    Result Date: 5/1/2022  Low lung volumes with patchy airspace disease which may represent multifocal atelectasis. Overall stable appearing chest.  Possible trace effusions. XR CHEST PORTABLE    Result Date: 4/30/2022  Relatively stable appearance of the chest with bibasilar airspace opacities. The findings from the last RT Protocol Assessment were as follows:   History Pulmonary Disease: Chronic pulmonary disease  Respiratory Pattern: Dyspnea on exertion or RR 21-25 bpm  Breath Sounds: Inspiratory and expiratory or bilateral wheezing and/or rhonchi  Cough: Weak, productive  Indication for Bronchodilator Therapy: Decreased or absent breath sounds  Bronchodilator Assessment Score: 12    Aerosolized bronchodilator medication orders have been revised according to the RT Inhaler-Nebulizer Bronchodilator Protocol below. Respiratory Therapist to perform RT Therapy Protocol Assessment initially then follow the protocol. Repeat RT Therapy Protocol Assessment PRN for score 0-3 or on second treatment, BID, and PRN for scores above 3. No Indications - adjust the frequency to every 6 hours PRN wheezing or bronchospasm, if no treatments needed after 48 hours then discontinue using Per Protocol order mode. If indication present, adjust the RT bronchodilator orders based on the Bronchodilator Assessment Score as indicated below.   Use Inhaler orders unless patient has one or more of the following: on home nebulizer, not able to hold breath for 10 seconds, is not alert and oriented, cannot activate and use MDI correctly, or respiratory rate 25 breaths per minute or more, then use the equivalent nebulizer order(s) with same Frequency and PRN reasons based on the score. If a patient is on this medication at home then do not decrease Frequency below that used at home. 0-3 - enter or revise RT bronchodilator order(s) to equivalent RT Bronchodilator order with Frequency of every 4 hours PRN for wheezing or increased work of breathing using Per Protocol order mode. 4-6 - enter or revise RT Bronchodilator order(s) to two equivalent RT bronchodilator orders with one order with BID Frequency and one order with Frequency of every 4 hours PRN wheezing or increased work of breathing using Per Protocol order mode. 7-10 - enter or revise RT Bronchodilator order(s) to two equivalent RT bronchodilator orders with one order with TID Frequency and one order with Frequency of every 4 hours PRN wheezing or increased work of breathing using Per Protocol order mode. 11-13 - enter or revise RT Bronchodilator order(s) to one equivalent RT bronchodilator order with QID Frequency and an Albuterol order with Frequency of every 4 hours PRN wheezing or increased work of breathing using Per Protocol order mode. Greater than 13 - enter or revise RT Bronchodilator order(s) to one equivalent RT bronchodilator order with every 4 hours Frequency and an Albuterol order with Frequency of every 2 hours PRN wheezing or increased work of breathing using Per Protocol order mode.          Electronically signed by Tripp Alan RCP on 5/1/2022 at 7:56 PM

## 2022-05-01 NOTE — PROGRESS NOTES
05/01/22 0257   Patient Observation   Pulse 90   Resp 23   SpO2 91 %   Breath Sounds   Right Upper Lobe Rhonchi   Right Middle Lobe Rhonchi   Right Lower Lobe Diminished   Left Upper Lobe Rhonchi   Left Lower Lobe Diminished   Airway Clearance   Suction ET Tube   Suction Device Inline suction catheter   Sputum Method Obtained Endotracheal   Sputum Amount Moderate   Sputum Color/Odor White;Yellow   Sputum Consistency Thick   Vent Settings   FiO2  30 %   Resp Rate (Set) 18 bmp   Vt (Set, mL) 490 mL   PEEP/CPAP (cmH2O) 5   I:E Ratio 1:2.1   Trigger Sensitivity Flow (L/min) 3 L/min   Heater Temperature 98.1 °F (36.7 °C)   Vent Patient Data (Readings)   Resp Rate Observed 31   Vt (Observed, mL) 493 mL   Minute Ventilation (l/min) 14.9 l/min   I:E Ratio 1:2.1   PIP Observed (cm H2O) 30 cm H2O   MAP (cm H2O) 17   Plateau Pressure (cm H2O) 31 cm H2O   Vent Alarm Settings   High Pressure  40 cmH2O   Ve Max 20   Ve Min 4   Vt Low  300 mL   RR High 40 br/min   Apnea (Secs) 20 secs   Vent Observations - Device Integration   Vt Exhaled 513 mL   Rate Measured 27 br/min   Minute Volume 12.8 Liters   Peak Flow 55 L/min   Pressure Support 0 cmH20   Peak Inspiratory Pressure 35 cmH2O   Sensitivity 3   High Peep/I Pressure 0   I Time/ I Time % 0 s   Low Minute Volume Alarm 4 L/min   Mean Airway Pressure 16 cmH20   ETT (adult)   Placement Date/Time: 04/22/22 0945   Present on Admission/Arrival: No  Placed By: Licensed provider  Placement Verified By: Chest X-ray  Preoxygenation: Yes  Mask Ventilation: Mask ventilation not attempted (0)  Technique: Rapid sequence;Direct laryng. ..    Secured At 25 cm   Measured From Lips   ETT Placement Center   Secured By Commercial tube simon

## 2022-05-01 NOTE — PROGRESS NOTES
Bronchoscopy completed. 100 mg fentanyl, 5 mg versed given. MD to bolus ketamine. SBP 60's- 500 cc NS bolus given, levophed adjusted & fluid removal turned to zero on CRRT. Will continue to titrate down levo, complete NS bolus & then begin removing fluid again per Dr. Smith Hernandez.

## 2022-05-01 NOTE — PROGRESS NOTES
05/01/22 1928   Patient Observation   Pulse 88   Resp 25   SpO2 100 %   Breath Sounds   Right Upper Lobe Rhonchi   Right Middle Lobe Rhonchi   Right Lower Lobe Diminished   Left Upper Lobe Rhonchi   Left Lower Lobe Diminished   Airway Clearance   Suction ET Tube   Suction Device Inline suction catheter   Sputum Method Obtained Endotracheal   Sputum Color/Odor Yellow; Other (comment)  (red)   Sputum Consistency Thick   Vent Information   Vent Mode AC/VC   Vent Settings   FiO2  100 %   Resp Rate (Set) 18 bmp   Vt (Set, mL) 490 mL   PEEP/CPAP (cmH2O) 5   Trigger Sensitivity Flow (L/min) 3 L/min   Vent Patient Data (Readings)   Resp Rate Observed 28   Vt (Observed, mL) 461 mL   Minute Ventilation (l/min) 12 l/min   I:E Ratio 1:2.90   PIP Observed (cm H2O) 30 cm H2O   MAP (cm H2O) 14   Plateau Pressure (cm H2O) 29 cm H2O   Static Compliance (L/cm H2O) 19   Dynamic Compliance (L/cm H2O) 18 L/cm H2O   Vent Alarm Settings   High Pressure  40 cmH2O   Ve Max 20   Ve Min 4   Vt Low  300 mL   RR High 40 br/min   Apnea (Secs) 20 secs   Ambu Bag With Mask At Bedside Yes   Vent Observations - Device Integration   Vt Exhaled 365 mL   Rate Measured 21 br/min   Minute Volume 10.3 Liters   Peak Flow 50 L/min   Pressure Support 0 cmH20   Peak Inspiratory Pressure 33 cmH2O   Sensitivity 3   High Peep/I Pressure 0   I Time/ I Time % 0 s   Low Minute Volume Alarm 4 L/min   Mean Airway Pressure 16 cmH20   Additional Respiratory Assessments   Position Semi-Mejía's   Humidification Source Heated wire   Humidification Temp 37   Circuit Condensation Drained   ETT (adult)   Placement Date/Time: 04/22/22 0945   Present on Admission/Arrival: No  Placed By: Licensed provider  Placement Verified By: Chest X-ray  Preoxygenation: Yes  Mask Ventilation: Mask ventilation not attempted (0)  Technique: Rapid sequence;Direct laryng. ..    Secured At 26 cm   Measured From Lips   ETT Placement Right   Secured By Commercial tube simon   Site Assessment Dry

## 2022-05-01 NOTE — PROGRESS NOTES
04/30/22 2347   Patient Observation   Pulse 87   Resp 23   SpO2 94 %   Breath Sounds   Right Upper Lobe Rhonchi   Right Middle Lobe Rhonchi   Right Lower Lobe Diminished   Left Upper Lobe Rhonchi   Left Lower Lobe Diminished   Airway Clearance   Suction ET Tube;Oral   Suction Device Inline suction catheter;Roberto   Sputum Method Obtained Endotracheal   Sputum Amount Moderate   Sputum Color/Odor White;Yellow   Sputum Consistency Thick   Vent Information   Vent Mode AC/VC   Vent Settings   FiO2  30 %   Resp Rate (Set) 18 bmp   Vt (Set, mL) 490 mL   PEEP/CPAP (cmH2O) 5   I:E Ratio 1:2.3   Trigger Sensitivity Flow (L/min) 3 L/min   Humidification 98.6 °F (37 °C)   Heater Temperature 98.6 °F (37 °C)   Vent Patient Data (Readings)   Resp Rate Observed 23   Vt (Observed, mL) 590 mL   Minute Ventilation (l/min) 11.1 l/min   I:E Ratio 1:2.00   PIP Observed (cm H2O) 22 cm H2O   Plateau Pressure (cm H2O) 31 cm H2O   Vent Alarm Settings   High Pressure  40 cmH2O   Ve Max 20   Ve Min 4   Vt Low  300 mL   RR High 40 br/min   Apnea (Secs) 20 secs   Ambu Bag With Mask At Bedside Yes   Vent Observations - Device Integration   Vt Exhaled 498 mL   Rate Measured 24 br/min   Minute Volume 11.4 Liters   Peak Flow 55 L/min   Pressure Support 0 cmH20   Peak Inspiratory Pressure 29 cmH2O   Sensitivity 3   High Peep/I Pressure 0   I Time/ I Time % 0 s   Low Minute Volume Alarm 4 L/min   Mean Airway Pressure 15 cmH20   Additional Respiratory Assessments   Position Semi-Mejía's   Humidification Source Heated wire   Humidification Temp 37   Circuit Condensation Drained   ETT (adult)   Placement Date/Time: 04/22/22 0945   Present on Admission/Arrival: No  Placed By: Licensed provider  Placement Verified By: Chest X-ray  Preoxygenation: Yes  Mask Ventilation: Mask ventilation not attempted (0)  Technique: Rapid sequence;Direct laryng. ..    Secured At 26 cm   Measured From Lips   ETT Placement Left   Secured By Commercial tube simon   Site Assessment Dry

## 2022-05-01 NOTE — PROGRESS NOTES
Hospitalist Progress Note      PCP: Anuj Oropeza MD    Date of Admission: 4/21/2022     Subjective: still on pressors, on crrt with fluid removal    Medications:  Reviewed    Infusion Medications    DOBUTamine (DOBUTREX) 2 mg/mL infusion Stopped (04/29/22 1356)    dextrose      vasopressin (Septic Shock) infusion Stopped (04/29/22 0008)    ketamine (KETALAR) infusion 0.1 mg/kg/hr (04/30/22 2148)    dextrose      prismaSATE BGK 4/2.5 500 mL/hr at 04/30/22 1814    prismaSATE BGK 4/2.5 500 mL/hr at 04/30/22 1814    prismaSATE BGK 4/2.5 500 mL/hr at 04/30/22 1814    norepinephrine 5 mcg/min (04/30/22 2148)    sodium chloride Stopped (04/30/22 2030)     Scheduled Medications    insulin glargine  45 Units SubCUTAneous QAM    sennosides-docusate sodium  2 tablet Oral BID    insulin glargine  35 Units SubCUTAneous Nightly    midodrine  10 mg Oral TID WC    insulin lispro  0-18 Units SubCUTAneous Q4H    vancomycin (VANCOCIN) intermittent dosing (placeholder)   Other RX Placeholder    cefTRIAXone (ROCEPHIN) IV  1,000 mg IntraVENous Q24H    chlorhexidine  15 mL Mouth/Throat BID    pantoprazole  40 mg IntraVENous Daily    ipratropium-albuterol  1 ampule Inhalation Q4H    menthol-zinc oxide   Topical Daily    apixaban  2.5 mg Oral BID    aspirin  81 mg Oral Nightly    [Held by provider] insulin lispro  7 Units SubCUTAneous TID AC    sodium chloride flush  5-40 mL IntraVENous 2 times per day     PRN Meds: carboxymethylcellulose PF **AND** artificial tears, traZODone, ipratropium-albuterol, glucose, glucagon (rDNA), dextrose, dextrose bolus (hypoglycemia) **OR** dextrose bolus (hypoglycemia), glucagon (rDNA), dextrose, dextrose bolus (hypoglycemia) **OR** dextrose bolus (hypoglycemia), potassium chloride, magnesium sulfate, calcium gluconate **OR** calcium gluconate **OR** calcium gluconate **OR** calcium gluconate, sodium phosphate IVPB **OR** sodium phosphate IVPB **OR** sodium phosphate IVPB **OR** sodium phosphate IVPB, glucose, midazolam, fentanNYL, sodium chloride flush, sodium chloride, ondansetron **OR** ondansetron, polyethylene glycol, acetaminophen **OR** acetaminophen, perflutren lipid microspheres      Intake/Output Summary (Last 24 hours) at 4/30/2022 2337  Last data filed at 4/30/2022 2300  Gross per 24 hour   Intake 2302 ml   Output 4263 ml   Net -1961 ml       Physical Exam Performed:    /66   Pulse 85   Temp 97.5 °F (36.4 °C) (Bladder)   Resp 22   Ht 6' 2\" (1.88 m)   Wt 265 lb 8 oz (120.4 kg)   SpO2 90%   BMI 34.09 kg/m²         General appearance:  intubated  HEENT: NAD, oral ETT and OG noted   Neck: Supple, . No jugular venous distention. Trachea midline. Respiratory: Diminished breath sounds bilaterally  Cardiovascular: S 1 s2 heard, Tachycardic  Right chest HD catheter and Port noted  Abdomen: Soft, non-tender, non-distended with normal bowel sounds. Colostomy noted in left LQ  Musculoskeletal: Right BKA with stump healthy. Superficial skin ulcers with no active infection noted  Left LE edema with superficial ulceration noted   Skin: see Epic wound pictures, chronic exposed hardware in lower thoracic and lumbar region   Neurologic: intubated     Labs:   Recent Labs     04/28/22  0315 04/29/22  0604 04/30/22  0354   WBC 10.1 10.1 18.6*   HGB 10.5* 9.7* 10.3*   HCT 32.7* 30.5* 32.5*   PLT 83* 85* 155     Recent Labs     04/30/22  0841 04/30/22  1517 04/30/22  2042   * 131* 132*   K 4.1 3.9 3.9   CL 93* 95* 96*   CO2 25 25 25   BUN 23* 23* 24*   CREATININE <0.5* <0.5* 0.6*   CALCIUM 8.8 8.5 8.3   PHOS 2.2* 2.1* 2.6     No results for input(s): AST, ALT, BILIDIR, BILITOT, ALKPHOS in the last 72 hours. No results for input(s): INR in the last 72 hours. No results for input(s): Les Saranya in the last 72 hours.     Urinalysis:      Lab Results   Component Value Date    NITRU Negative 04/21/2022    WBCUA 21-50 04/21/2022    BACTERIA 4+ 04/21/2022    RBCUA  04/21/2022    BLOODU LARGE 04/21/2022    SPECGRAV >=1.030 04/21/2022    GLUCOSEU Negative 04/21/2022    GLUCOSEU >=1000 mg/dL 08/31/2010       Radiology:  XR CHEST PORTABLE   Final Result   Relatively stable appearance of the chest with bibasilar airspace opacities. XR CHEST PORTABLE   Final Result   Low volume study with bilateral atelectasis or airspace disease, similar to   prior, with persistent small pleural effusions. XR CHEST PORTABLE   Final Result   Stable chest.         XR CHEST PORTABLE   Final Result   Endotracheal tube tip projects at the mid intrathoracic trachea. Otherwise   stable chest.         XR CHEST PORTABLE   Final Result   Suboptimal examination due to patient rotation. The chest appears stable. XR CHEST PORTABLE   Final Result   Endotracheal tube tip projects at the thoracic inlet. Otherwise stable chest.         XR CHEST PORTABLE   Final Result   Stable chest.         XR CHEST PORTABLE   Final Result   Stable endotracheal tube located approximately 3.3 cm above the quintin. Low lung volumes. Suspected small right pleural effusion. XR CHEST PORTABLE   Final Result   Endotracheal tube in satisfactory position above the quintin      Bibasilar hypoaeration persist         CT ABDOMEN PELVIS W IV CONTRAST Additional Contrast? None   Final Result   1. Moderate amount of ascites with 3rd spacing including edematous changes   throughout the abdomen and anasarca. 2. Delgado in place. Diffuse bladder wall thickening. Correlate for   underlying cystitis. 3. No acute bowel pathology. Mild gastric distension. Diverticulosis with   no acute features. 4. Mild renal cortical scarring and asymmetric right renal atrophy, stable. No hydronephrosis. CT CHEST PULMONARY EMBOLISM W CONTRAST   Final Result   1. No evidence of acute pulmonary embolism.   There is some thickening and   mild irregularity in the pulmonary arteries in the left lower lobe which may   represent chronic pulmonary embolism or secondary appearance to inflammation. No evidence of aortic aneurysm or dissection. 2. Moderate right effusion and right lower lobe atelectatic and/or   consolidative changes. Pneumonia is likely. There is severe loss of volume   in the right lung. Trace left effusion and left lower lobe atelectatic   changes are seen. 3. Moderate ascites around the spleen and liver. XR CHEST PORTABLE   Final Result   Low lung volumes. Bilateral pleural effusions with bibasilar volume loss,   right greater than left. No overt failure. XR CHEST PORTABLE    (Results Pending)           Assessment/Plan:    Active Hospital Problems    Diagnosis     Streptococcal toxic shock syndrome (HCC) [A48.3, B95.5]      Priority: Medium    Pneumonia due to infectious organism [J18.9]      Priority: Medium    Acute cystitis with hematuria [N30.01]      Priority: Medium    Bacteremia [R78.81]      Priority: Medium    Cardiogenic shock ([address] [R57.0]      Priority: Medium    Atelectasis of right lower lobe [J98.11]      Priority: Medium    Septic shock ([address] [A41.9, R65.21]      Priority: Medium    ESRD on dialysis ([address] [N18.6, Z99.2]     Anasarca associated with disorder of kidney [N04.9]     Bacteremia due to Streptococcus pyogenes ([address] [A40.0]     Hyperkalemia [E87.5]     Bullous cellulitis of right thigh [T10.667]             Septic shock. Enterococcus faecalis and strep pyogenes bacteremia. Right lower extremity cellulitis  Chronic pressure ulcerson the back with exposed hardware      Source could be HD line vs exposed hardware  Patient was on vancomycin, Merrem and clindamycin. Presently changed to clindamycin and ceftriaxone per ID. Also on Vanc. Off Merrem. ID managing this. ID will stop Cleocin today. Improving wbc and sepsis  Repeat cx remain neg     Severe hypotensive shock  off Levophed, vasopressin but still on dobutamine(low EF).  Did not tolerate decreasing Dobutamine. on hydrocortisone for shock  Critical care managing     End-stage renal disease on hemodialysis. Nephrology consulted  Initiated on CRRT   - off pressors. Trying to keep negative fluid balance.     Acute hypoxic resp failure  Healthcare associated pneumonia. Intubated in the ICU on 4/22. Antibiotics as above.     Chronic systolic congestive heart failure, EF 25 to 30%. Cardiology consulted  Hold home medications given hypotension  Currently on dobutamine and multiple pressors     Acute metabolic encephalopathy resolved. Secondary to septic shock  Improving   Able to follow simple commands     Type 2 diabetes.   Lantus insulin and sliding scale insulin     History of DVT  Continue Eliquis    Diet: Diet NPO  ADULT TUBE FEEDING; Orogastric; Renal Formula; Continuous; 35; Yes; 5; Q 4 hours; 40; 30; Q 4 hours; Protein; one proteinex P2Go TWICE daily via feeding tube  Code Status: Full Code    PT/OT Eval Status: ordered    Paula Lei MD

## 2022-05-02 PROBLEM — A48.3 STREPTOCOCCAL TOXIC SHOCK SYNDROME (HCC): Status: RESOLVED | Noted: 2022-01-01 | Resolved: 2022-01-01

## 2022-05-02 PROBLEM — B95.5 STREPTOCOCCAL TOXIC SHOCK SYNDROME (HCC): Status: RESOLVED | Noted: 2022-01-01 | Resolved: 2022-01-01
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
## 2022-05-02 NOTE — PROGRESS NOTES
05/02/22 1908   Patient Observation   Pulse 88   Resp 22   SpO2 96 %   Breath Sounds   Right Upper Lobe Rhonchi   Right Middle Lobe Rhonchi   Right Lower Lobe Diminished   Left Upper Lobe Rhonchi   Left Lower Lobe Diminished   Airway Clearance   Suction ET Tube   Suction Device Inline suction catheter   Sputum Amount Small   Sputum Color/Odor Yellow   Sputum Consistency Thick   Skin Assessment   Skin Assessment Clean, dry, & intact   Vent Settings   FiO2  45 %   Resp Rate (Set) 18 bmp   Vt (Set, mL) 490 mL   PEEP/CPAP (cmH2O) 5   Trigger Sensitivity Flow (L/min) 3 L/min   Heater Temperature 98.1 °F (36.7 °C)   Vent Patient Data (Readings)   Resp Rate Observed 19   Vt (Observed, mL) 491 mL   Minute Ventilation (l/min) 9.5 l/min   I:E Ratio 1:1.10   PIP Observed (cm H2O) 29 cm H2O   Plateau Pressure (cm H2O) 25 cm H2O   Static Compliance (L/cm H2O) 25   Dynamic Compliance (L/cm H2O) 20 L/cm H2O   Vent Alarm Settings   High Pressure  40 cmH2O   RR High 40 br/min   Vent Observations - Device Integration   Vt Exhaled 472 mL   Rate Measured 21 br/min   Minute Volume 10.5 Liters   Peak Flow 50 L/min   Pressure Support 0 cmH20   Peak Inspiratory Pressure 29 cmH2O   Sensitivity 3   High Peep/I Pressure 0   I Time/ I Time % 0 s   Low Minute Volume Alarm 4 L/min   Mean Airway Pressure 14 cmH20   Additional Respiratory Assessments   Position Semi-Mejía's   Humidification Source Heated wire   Humidification Temp 37   Circuit Condensation Drained   Subglottic Suction Done Yes   ETT (adult)   Placement Date/Time: 04/22/22 0945   Present on Admission/Arrival: No  Placed By: Licensed provider  Placement Verified By: Chest X-ray  Preoxygenation: Yes  Mask Ventilation: Mask ventilation not attempted (0)  Technique: Rapid sequence;Direct laryng. ..    Secured At 26 cm   Measured From Lips   ETT Placement Right   Secured By Commercial tube simon   Site Assessment Dry

## 2022-05-02 NOTE — PROGRESS NOTES
Pts VTs lowering and RR increasing. 02 also decreasing into the 80s. Pt placed back on AC support and fi02 increased to 50% at this time. Continuing to monitor pt.

## 2022-05-02 NOTE — PROGRESS NOTES
Reassessment completed, see flowsheets, unchanged from prior. VSS.       Levophed 4 mcg/min infusing.     CRRT running well, removing 100 ml/hr, pt tolerating well so far.     All ICU Lines and monitoring remain in place. Bed locked in lowest position.  Call light within reach.

## 2022-05-02 NOTE — PROGRESS NOTES
Comprehensive Nutrition Assessment    Type and Reason for Visit:  Reassess    Nutrition Recommendations/Plan:   1. Continue TF order - ADULT TUBE FEEDING; Orogastric; Renal formula - Nepro with a goal rate of 40 ml/hr x 20 hours + 30 ml water flushes every 4 hours + one proteinex P2Go TWICE daily via feeding tube. 2. Monitor TF rate, intake, and tolerance + water flushes + administration of one proteinex P2Go TWICE daily. 3. Monitor respiratory status, renal status + CRRT, and plan of care. 4. Monitor nutrition-related labs, ostomy output/function, and weight trends. Malnutrition Assessment:  Malnutrition Status:   At risk for malnutrition (05/02/22 1217)    Context:  Acute Illness     Findings of the 6 clinical characteristics of malnutrition:  Energy Intake:  No significant decrease in energy intake  Weight Loss:  Greater than 2% over 1 week (- 27# or 9.5% weight loss since 4/22/22)     Body Fat Loss:  No significant body fat loss Orbital   Muscle Mass Loss:  No significant muscle mass loss Temples (temporalis)  Fluid Accumulation:  Moderate to Severe Extremities (BLE + 2 pitting edema)   Strength:  Not Performed    Nutrition Assessment:    patient remains unchanged from a nutritional standpoint since last RD assessment; patient remains at risk for further compromise d/t need for EN as sole source of nutrition, altered nutrition-related labs, renal dysfunction + CRRT, and pressure wounds; will continue Nepro at goal rate of 40 ml/hr x 20 hours + 30 ml water flushes every 4 hours + one proteinex P2Go TWICE daily via feeding tube    Nutrition Related Findings:    patient remains intubated; propofol is not being used for sedation; TF is infusing at goal rate without issue; + ostomy output noted; + CRRT continues with -50 ml/hr fluid removal; abdomen is distended, non-tender, and bowel sounds are hypoactive/absent; BLE + 2 pitting edema noted; h/h, Na, Cl, and Ca are low; BUN is elevated; patient has 30 units Lantus ordered nightly and in am + he is on high-dose SSI; patient has protonix and Senokot-S ordered for bowel regimen at this time Wound Type: Multiple,Pressure Injury,Surgical Incision,Unstageable,Full Thickness,Partial Thickness (full thickness, trauma - pre-tib; unstageable left lower leg; full thickness, surgical - mid back; partial thickness skin loss - sacrum/buttocks)       Current Nutrition Intake & Therapies:    Average Meal Intake: NPO  Average Supplements Intake: NPO  Current Tube Feeding (TF) Orders:  · Feeding Route: Orogastric  · Formula: Renal Formula  · Schedule: Continuous  · Feeding Regimen: Nepro with a goal rate of 40 ml/hr x 20 hours  · Additives/Modulars: Protein (one proteinex P2Go TWICE daily)  · Water Flushes: 30 ml water flushes every 4 hours for tube patency  · Current TF & Flush Orders Provides: TF is at goal rate and patient is tolerating well  · Goal TF & Flush Orders Provides: Nepro with a goal rate of 40 ml/hr x 20 hours = 800 ml TV, 1440 kcals, 65 g protein, and 582 ml free water + 52 g protein and 208 kcals from one proteinex P2Go TWICE daily (117 g protein and 1648 kcals total) + 30 ml water flushes every 4 hours for tube patency      Anthropometric Measures:  Height: 6' 2\" (188 cm)  Ideal Body Weight (IBW): 190 lbs (86 kg)    Admission Body Weight: 288 lb 12.8 oz (131 kg) (obtained on 4/22/22; actual weight)  Current Body Weight: 261 lb 8 oz (118.6 kg) (obtained on 5/2/22; actual weight), 137.6 % IBW.  Weight Source: Bed Scale  Current BMI (kg/m2): 33.6  Usual Body Weight: 288 lb 12.8 oz (131 kg) (obtained on 4/22/22; actual weight)  % Weight Change (Calculated): -9.5  Weight Adjustment For: Paraplegia,Amputation (R BKA)  Total Adjusted Percentage (Calculated): 13.4  Adjusted Ideal Body Weight (lbs) (Calculated): 164.5 lbs  Adjusted Ideal Body Weight (kg) (Calculated): 74.77 kg  Adjusted % Ideal Body Weight (Calculated): 159  Adjusted BMI (kg/m2) (Calculated): 38.1  BMI Categories: Obese Class 2 (BMI 35.0 -39.9)    Estimated Daily Nutrient Needs:  Energy Requirements Based On: Kcal/kg  Weight Used for Energy Requirements: Current  Energy (kcal/day): 1309 - 1666 kcals based on 11-14 kcals/kg/CBW  Weight Used for Protein Requirements: Ideal  Protein (g/day): 155 - 172 g protein based on 1.8-2.0 g/kg/IBW (+ CRRT)  Method Used for Fluid Requirements: 1 ml/kcal  Fluid (ml/day): 1309 - 1666 ml    Nutrition Diagnosis:   · Inadequate oral intake related to inadequate protein-energy intake,impaired respiratory function,renal dysfunction,increase demand for energy/nutrients as evidenced by NPO or clear liquid status due to medical condition,intubation,nutrition support - enteral nutrition,wounds,lab values,dialysis      Nutrition Interventions:   Food and/or Nutrient Delivery: Continue NPO,Continue Current Tube Feeding  Nutrition Education/Counseling: No recommendation at this time  Coordination of Nutrition Care: Continue to monitor while inpatient,Interdisciplinary Rounds  Plan of Care discussed with: ICU Team    Goals:  Previous Goal Met:  (ongoing)  Goals: Tolerate nutrition support at goal rate       Nutrition Monitoring and Evaluation:   Behavioral-Environmental Outcomes: None Identified  Food/Nutrient Intake Outcomes: Enteral Nutrition Intake/Tolerance  Physical Signs/Symptoms Outcomes: Biochemical Data,Fluid Status or Edema,Hemodynamic Status,Nutrition Focused Physical Findings,Weight,Skin    Discharge Planning:     Too soon to determine     Isak Ingram, [address] [place] [address],   Contact: [phone]

## 2022-05-02 NOTE — PROGRESS NOTES
Reassessment completed, see flowsheets, unchanged from prior. VSS.       Levophed 4 mcg/min infusing.     CRRT running well, removing 75 ml/hr, pt tolerating well so far.     All ICU Lines and monitoring remain in place. Bed locked in lowest position.  Call light within reach.

## 2022-05-02 NOTE — PROGRESS NOTES
Care rounds completed with Dr. Ezekiel Rubin and multidisciplinary team. Reviewed labs, meds, VS, assessment, & plan of care for today. See dictated note and new orders for details.

## 2022-05-02 NOTE — PROGRESS NOTES
Shift assessment completed, see flow sheet. Medications administered per MAR.     SpO2 100% on 100% and +5. (FiO2 not turned down on previous shift after bronch). Bilateral LS dimnished with rhonchi. Thick, creamy secretions suctioned via ETT.     RASS -1, opens eyes and nods appropriately to verbal stimulus. Follows commands in BUE.     Ketamine turned off d/t frequent fluctuating BPs and pt being calm on lowest dose. Will monitor closely for agitation and give PRNs when needed. Levophed turned off d/t BP reading extremely high from 5966-7537. Then restarted when next BP reading was 60/40 (45). Now infusing at 6 mcg/min.     PIV x3, VC, and port in place and functioning appropriately, dressings C/D/I.     CRRT running with a goal to remove 50 ml/hr per nephrology. Ok to keep even when needed to prevent further pressor requirements per Dr. Aldo Lao. VC functioning well w/o access issues.     Tube feed infusing at goal rate and pt tolerating. Delgado catheter in place draining cloudy, tea colored urine. UO very minimal d/t hx CKD/HD and currently on CRRT. Colostomy in place draining brown, watery stool. BS 65, D10 given and BS 78 on recheck. SS and lantus held. Will check again around midnight.     All lines and monitoring devices remain in place. Call light within reach. Bed locked and in lowest position with alarm on. Will continue to monitor.

## 2022-05-02 NOTE — CARE COORDINATION
INTERDISCIPLINARY PLAN OF CARE CONFERENCE    Date/Time: 5/2/2022 9:10 AM  Completed by: CONRAD Pelletier  Case Management    Patient Name:  Jennifer Tristan  YOB: 1965  Admitting Diagnosis: Hyperkalemia [E87.5]  Hypoglycemia [E16.2]  ESRD on dialysis ([facility] [N18.6, Z99.2]  Acute cystitis with hematuria [N30.01]  Septic shock ([facility] [A41.9, R65.21]  Sepsis ([facility] [A41.9]  Pneumonia due to infectious organism, unspecified laterality, unspecified part of lung [J18.9]     Admit Date/Time:  4/21/2022  1:21 PM    Chart reviewed. Interdisciplinary team contacted or reviewed plan related to patient progress and discharge plans. Disciplines included Case Management, Nursing, and Dietitian. Current Status:Ongoing. On SBT this AM. IV ABX. CRRT  PT/OT recommendation for discharge plan of care: TBD    Expected D/C Disposition:  Home    Discharge Plan Comments: Chart review completed. Completed Interdisciplinary rounds with ICU staff. Pt is from home with family and the goal is for him to return home. Spoke with Roopa at [facility] who confirmed pt is active with them for RN only for wound care and pt is on a hospital hold. No questions asked at this time or issues expressed of resuming services. CM will continue to follow and assist with developing discharge plans pending medical progress. Please notify CM if needs or concerns arise. Home O2 in place on admit: yes per [employer] at 2 liters continuous.

## 2022-05-02 NOTE — PROGRESS NOTES
EOS report given to Jerry Busch RN. Changes overnight:  · PRN percocet given around 2200, pt nodding head yes when asked if he was in pain. Percocet utilized rather than fentanyl at pt's request, nodded yes when asked if the percocet was working better than the fentanyl. Given again around 0500 after bath and wound care. · Pt's mother called around [phone], updated on pt status and POC. · Levophed had to be turned off then restarted again d/t fluctuations in BP multiple times throughout the night. Currently at 4 mcg/min. · WBC increased again, from 26 to 33. Tracheal aspirate from 4/30 positive for Acinetobacter baumannii, day shift RN notified in report. · iCa+ replaced =, 1 g x2. No other changes noted. Pt stable at this time, care transferred.

## 2022-05-02 NOTE — PROGRESS NOTES
Dr. Mimi Gotti at the bedside to examine pt. See progress note for details.      MIGDALIA merrem, add ceftazidime and tobramycin nebulizer

## 2022-05-02 NOTE — PROGRESS NOTES
Shift assessment, completed, see flow sheet. Pt RASS -1, following commands.      Intubated and sedated with a # 7.5 ETT, at 26 LL.   On AC 19 / 490 /50 %/ 5.  Temp 97.2 via bladder, SR 82, BP 106/87 (94), SpO2 100%. Respirations are easy, even, and unlabored. Bilateral lung sounds rhonchi/diminished. OG in place at 64, with TF 40 ml/hr (goal).       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 Covered with xeroform and ABD pads     R PAC, WNL with levophed 4 mcg/min infusing.      3 PIVs, WNL, KVO infusing.     R tunneled vas cath with CRRT running,  ml/min, Pre 500 ml/hr, Post 500 ml/hr, dialysate 500 ml/hr. Removing 50 ml/hr. Blood warmer in place on return line and turned on.     Delgado in place and patent with britney output.  LUQ colostomy with brown watery output.      Pt on dolphin immersion mattress. Will continue to monitor

## 2022-05-02 NOTE — PROGRESS NOTES
Pharmacy Vancomycin Consult     Vancomycin Day: 10  Current Dosing: Pulse  Current indication: bloodstream infection    Recent Labs     05/01/22  0333 05/01/22  0908 05/01/22 2042 05/02/22  0258   BUN 24*   < > 20 21*   CREATININE 0.6*   < > <0.5* <0.5*   WBC 26.2*  --   --  33.1*    < > = values in this interval not displayed. Estimated Creatinine Clearance: 232 mL/min (based on SCr of 0.5 mg/dL). Trough: 17.1    Assessment/Plan:  Will dose with 1000 mg x1 and recheck a level with AM labs tomorrow.      Alexandria Gusman, CamD, [facility], 5/2/2022 4:27 AM

## 2022-05-02 NOTE — PROGRESS NOTES
Hospitalist Progress Note      PCP: North Samuels MD    Date of Admission: 4/21/2022    Subjective: had bronch this am, became more hypotensive, on CRRT.  Worsening WBC with fever    Medications:  Reviewed    Infusion Medications    DOBUTamine (DOBUTREX) 2 mg/mL infusion Stopped (04/29/22 1356)    dextrose      vasopressin (Septic Shock) infusion Stopped (04/29/22 0008)    ketamine (KETALAR) infusion Stopped (05/01/22 1926)    dextrose      prismaSATE BGK 4/2.5 500 mL/hr at 05/02/22 0017    prismaSATE BGK 4/2.5 500 mL/hr at 05/02/22 0017    prismaSATE BGK 4/2.5 500 mL/hr at 05/02/22 0017    norepinephrine 6 mcg/min (05/01/22 2230)    sodium chloride Stopped (05/01/22 2038)     Scheduled Medications    insulin glargine  30 Units SubCUTAneous Nightly    insulin glargine  30 Units SubCUTAneous QAM    meropenem  1,000 mg IntraVENous Q8H    sennosides-docusate sodium  2 tablet Oral BID    midodrine  10 mg Oral TID WC    insulin lispro  0-18 Units SubCUTAneous Q4H    vancomycin (VANCOCIN) intermittent dosing (placeholder)   Other RX Placeholder    chlorhexidine  15 mL Mouth/Throat BID    pantoprazole  40 mg IntraVENous Daily    ipratropium-albuterol  1 ampule Inhalation Q4H    menthol-zinc oxide   Topical Daily    apixaban  2.5 mg Oral BID    aspirin  81 mg Oral Nightly    sodium chloride flush  5-40 mL IntraVENous 2 times per day     PRN Meds: oxyCODONE-acetaminophen, carboxymethylcellulose PF **AND** artificial tears, traZODone, ipratropium-albuterol, glucose, glucagon (rDNA), dextrose, dextrose bolus (hypoglycemia) **OR** dextrose bolus (hypoglycemia), glucagon (rDNA), dextrose, dextrose bolus (hypoglycemia) **OR** dextrose bolus (hypoglycemia), potassium chloride, magnesium sulfate, calcium gluconate **OR** calcium gluconate **OR** calcium gluconate **OR** calcium gluconate, sodium phosphate IVPB **OR** sodium phosphate IVPB **OR** sodium phosphate IVPB **OR** sodium phosphate IVPB, glucose, midazolam, fentanNYL, sodium chloride flush, sodium chloride, ondansetron **OR** ondansetron, polyethylene glycol, acetaminophen **OR** acetaminophen, perflutren lipid microspheres      Intake/Output Summary (Last 24 hours) at 5/2/2022 0053  Last data filed at 5/1/2022 2230  Gross per 24 hour   Intake 3364.32 ml   Output 2743 ml   Net 621.32 ml       Physical Exam Performed:    BP 88/64   Pulse 88   Temp 96.2 °F (35.7 °C) (Bladder)   Resp 20   Ht 6' 2\" (1.88 m)   Wt 264 lb 9.6 oz (120 kg)   SpO2 100%   BMI 33.97 kg/m²       General appearance:  intubated  HEENT: NAD, oral ETT and OG noted   Neck: Supple, . No jugular venous distention. Trachea midline. Respiratory: Diminished breath sounds bilaterally  Cardiovascular: S 1 s2 heard, Tachycardic  Right chest HD catheter and Port noted  Abdomen: Soft, non-tender, non-distended with normal bowel sounds. Colostomy noted in left LQ  Musculoskeletal: Right BKA with stump healthy. Superficial skin ulcers with no active infection noted  Left LE edema with superficial ulceration noted   Skin: see Epic wound pictures, chronic exposed hardware in lower thoracic and lumbar region   Neurologic: intubated    Labs:   Recent Labs     04/29/22  0604 04/30/22  0354 05/01/22  0333   WBC 10.1 18.6* 26.2*   HGB 9.7* 10.3* 10.7*   HCT 30.5* 32.5* 34.5*   PLT 85* 155 159     Recent Labs     05/01/22  0908 05/01/22  1607 05/01/22  2042   * 131* 131*   K 4.0 3.9 3.8   CL 96* 97* 97*   CO2 25 26 26   BUN 21* 19 20   CREATININE <0.5* <0.5* <0.5*   CALCIUM 8.6 8.2* 7.9*   PHOS 2.1* 2.3* 3.3     No results for input(s): AST, ALT, BILIDIR, BILITOT, ALKPHOS in the last 72 hours. No results for input(s): INR in the last 72 hours. No results for input(s): [place] in the last 72 hours.     Urinalysis:      Lab Results   Component Value Date    NITRU Negative 04/21/2022    WBCUA 21-50 04/21/2022    BACTERIA 4+ 04/21/2022    RBCUA  04/21/2022    BLOODU LARGE 04/21/2022    SPECGRAV >=1.030 04/21/2022    GLUCOSEU Negative 04/21/2022    GLUCOSEU >=1000 mg/dL 08/31/2010       Radiology:  XR CHEST PORTABLE   Final Result   Low lung volumes with patchy airspace disease which may represent multifocal   atelectasis. Overall stable appearing chest.  Possible trace effusions. XR CHEST PORTABLE   Final Result   Relatively stable appearance of the chest with bibasilar airspace opacities. XR CHEST PORTABLE   Final Result   Low volume study with bilateral atelectasis or airspace disease, similar to   prior, with persistent small pleural effusions. XR CHEST PORTABLE   Final Result   Stable chest.         XR CHEST PORTABLE   Final Result   Endotracheal tube tip projects at the mid intrathoracic trachea. Otherwise   stable chest.         XR CHEST PORTABLE   Final Result   Suboptimal examination due to patient rotation. The chest appears stable. XR CHEST PORTABLE   Final Result   Endotracheal tube tip projects at the thoracic inlet. Otherwise stable chest.         XR CHEST PORTABLE   Final Result   Stable chest.         XR CHEST PORTABLE   Final Result   Stable endotracheal tube located approximately 3.3 cm above the quintin. Low lung volumes. Suspected small right pleural effusion. XR CHEST PORTABLE   Final Result   Endotracheal tube in satisfactory position above the quintin      Bibasilar hypoaeration persist         CT ABDOMEN PELVIS W IV CONTRAST Additional Contrast? None   Final Result   1. Moderate amount of ascites with 3rd spacing including edematous changes   throughout the abdomen and anasarca. 2. Delgado in place. Diffuse bladder wall thickening. Correlate for   underlying cystitis. 3. No acute bowel pathology. Mild gastric distension. Diverticulosis with   no acute features. 4. Mild renal cortical scarring and asymmetric right renal atrophy, stable. No hydronephrosis.          CT CHEST PULMONARY EMBOLISM W CONTRAST Final Result   1. No evidence of acute pulmonary embolism. There is some thickening and   mild irregularity in the pulmonary arteries in the left lower lobe which may   represent chronic pulmonary embolism or secondary appearance to inflammation. No evidence of aortic aneurysm or dissection. 2. Moderate right effusion and right lower lobe atelectatic and/or   consolidative changes. Pneumonia is likely. There is severe loss of volume   in the right lung. Trace left effusion and left lower lobe atelectatic   changes are seen. 3. Moderate ascites around the spleen and liver. XR CHEST PORTABLE   Final Result   Low lung volumes. Bilateral pleural effusions with bibasilar volume loss,   right greater than left. No overt failure. XR CHEST PORTABLE    (Results Pending)   XR CHEST PORTABLE    (Results Pending)           Assessment/Plan:    Active Hospital Problems    Diagnosis     Mucus plugging of bronchi [T17.500A]      Priority: Medium    Hospital-acquired pneumonia [J18.9, Y95]      Priority: Medium    Streptococcal toxic shock syndrome (HCC) [A48.3, B95.5]      Priority: Medium    Pneumonia due to infectious organism [J18.9]      Priority: Medium    Acute cystitis with hematuria [N30.01]      Priority: Medium    Bacteremia [R78.81]      Priority: Medium    Cardiogenic shock ([address] [R57.0]      Priority: Medium    Atelectasis of right lower lobe [J98.11]      Priority: Medium    Septic shock ([address] [A41.9, R65.21]      Priority: Medium    ESRD on dialysis ([address] [N18.6, Z99.2]     Anasarca associated with disorder of kidney [N04.9]     Bacteremia due to Streptococcus pyogenes ([address] [A40.0]     Hyperkalemia [E87.5]     Bullous cellulitis of right thigh [W59.832]               Septic shock. Enterococcus faecalis and strep pyogenes bacteremia.   Right lower extremity cellulitis  Chronic pressure ulcerson the back with exposed hardware      Source could be HD line vs exposed hardware  Patient was on vancomycin, Merrem and clindamycin.  Presently changed to clindamycin and ceftriaxone per ID. Also on Vanc. Off Merrem. ID managing this. ID will stopped Cleocin   Repeat cx remain neg  Severe hypotensive shock  off Levophed, vasopressin but still on dobutamine(low EF). Did not tolerate decreasing Dobutamine.    on hydrocortisone for shock-->PO pred  Critical care managing  5/1 - back on levophed, s/p bronch today 2/2 increasing WBC and fever     End-stage renal disease on hemodialysis. Nephrology consulted  Initiated on CRRT   - back on pressors. Trying to keep negative fluid balance.     Acute hypoxic resp failure  Healthcare associated pneumonia. Intubated in the ICU on 4/22. Antibiotics as above. S/p bronch 5/1/22 given worsening leucocytosis/fever     Chronic systolic congestive heart failure, EF 25 to 30%. Cardiology consulted  Hold home medications given hypotension  Currently on dobutamine and multiple pressors     Acute metabolic encephalopathy resolved. Secondary to septic shock  Improving   Able to follow simple commands     Type 2 diabetes.   Lantus insulin and sliding scale insulin     History of DVT  Continue Eliquis      Diet: Diet NPO  ADULT TUBE FEEDING; Orogastric; Renal Formula; Continuous; 35; Yes; 5; Q 4 hours; 40; 30; Q 4 hours; Protein; one proteinex P2Go TWICE daily via feeding tube  Code Status: Full Code    PT/OT Eval Status: not ordered    Kimberly Fleming MD
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
## 2022-05-02 NOTE — PROGRESS NOTES
Dr. Alejandra Blancas at the bedside to examine pt. See progress note for details. Try removing 75 to 100 ml/hr.  If going up on levophed then go back to removing 50 ml/hr
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
## 2022-05-02 NOTE — PROGRESS NOTES
Hospitalist Progress Note      PCP: Isauro Larios MD    Date of Admission: 4/21/2022    Subjective:     49 y.o. male with hx of ischemic CM, paraplegia, IDDM, ESRD on HD presents with bacteremia and decubitus ulcers, septic shock     Improving sepsis from last week, off sammi, vaso, dobutamine, now remains on levophed, weaning pressors   No fevers    Ongoing CRRT with fluid removal   No fevers    had bronch on the weekend    Pt seen sedated on vent support resting , family at bedside     Medications:  Reviewed    Infusion Medications    DOBUTamine (DOBUTREX) 2 mg/mL infusion Stopped (04/29/22 1356)    vasopressin (Septic Shock) infusion Stopped (04/29/22 0008)    ketamine (KETALAR) infusion Stopped (05/01/22 1926)    dextrose      prismaSATE BGK 4/2.5 500 mL/hr at 05/02/22 0017    prismaSATE BGK 4/2.5 500 mL/hr at 05/02/22 0017    prismaSATE BGK 4/2.5 500 mL/hr at 05/02/22 0017    norepinephrine 3 mcg/min (05/02/22 0700)    sodium chloride Stopped (05/01/22 2038)     Scheduled Medications    insulin glargine  30 Units SubCUTAneous Nightly    insulin glargine  30 Units SubCUTAneous QAM    meropenem  1,000 mg IntraVENous Q8H    sennosides-docusate sodium  2 tablet Oral BID    midodrine  10 mg Oral TID WC    insulin lispro  0-18 Units SubCUTAneous Q4H    vancomycin (VANCOCIN) intermittent dosing (placeholder)   Other RX Placeholder    chlorhexidine  15 mL Mouth/Throat BID    pantoprazole  40 mg IntraVENous Daily    ipratropium-albuterol  1 ampule Inhalation Q4H    menthol-zinc oxide   Topical Daily    apixaban  2.5 mg Oral BID    aspirin  81 mg Oral Nightly    sodium chloride flush  5-40 mL IntraVENous 2 times per day     PRN Meds: oxyCODONE-acetaminophen, carboxymethylcellulose PF **AND** artificial tears, traZODone, ipratropium-albuterol, dextrose bolus (hypoglycemia) **OR** dextrose bolus (hypoglycemia), glucagon (rDNA), dextrose, potassium chloride, magnesium sulfate, calcium gluconate **OR** calcium gluconate **OR** calcium gluconate **OR** calcium gluconate, sodium phosphate IVPB **OR** sodium phosphate IVPB **OR** sodium phosphate IVPB **OR** sodium phosphate IVPB, glucose, midazolam, fentanNYL, sodium chloride flush, sodium chloride, ondansetron **OR** ondansetron, polyethylene glycol, acetaminophen **OR** acetaminophen, perflutren lipid microspheres      Intake/Output Summary (Last 24 hours) at 5/2/2022 0803  Last data filed at 5/2/2022 0700  Gross per 24 hour   Intake 3164.32 ml   Output 3341 ml   Net -176.68 ml       Physical Exam Performed:    BP (!) 87/57   Pulse 81   Temp 97.5 °F (36.4 °C) (Bladder)   Resp 19   Ht 6' 2\" (1.88 m)   Wt 261 lb 8 oz (118.6 kg)   SpO2 100%   BMI 33.57 kg/m²       General appearance:  intubated sedated   HEENT: NAD, oral ETT and OG noted   Neck: Supple, . No jugular venous distention. Trachea midline. Respiratory: Diminished breath sounds bilaterally  Cardiovascular: S 1 s2 heard, Tachycardic  Right chest HD catheter and Port noted  Abdomen: Soft, non-tender, non-distended with normal bowel sounds. Colostomy noted in left LQ  Musculoskeletal: Right BKA with stump healthy. Superficial skin ulcers with no active infection noted  Left LE edema with superficial ulceration noted   Skin: see Epic wound pictures, chronic exposed hardware in lower thoracic and lumbar region   Neurologic: intubated and sedated     Labs:   Recent Labs     04/30/22  0354 05/01/22  0333 05/02/22  0258   WBC 18.6* 26.2* 33.1*   HGB 10.3* 10.7* 10.3*   HCT 32.5* 34.5* 33.1*    159 152     Recent Labs     05/01/22  1607 05/01/22  2042 05/02/22  0258   * 131* 131*   K 3.9 3.8 4.1   CL 97* 97* 97*   CO2 26 26 26   BUN 19 20 21*   CREATININE <0.5* <0.5* <0.5*   CALCIUM 8.2* 7.9* 8.2*   PHOS 2.3* 3.3 2.9     No results for input(s): AST, ALT, BILIDIR, BILITOT, ALKPHOS in the last 72 hours. No results for input(s): INR in the last 72 hours.   No results for input(s): Noah Bose in the last 72 hours. Urinalysis:      Lab Results   Component Value Date    NITRU Negative 04/21/2022    WBCUA 21-50 04/21/2022    BACTERIA 4+ 04/21/2022    RBCUA  04/21/2022    BLOODU LARGE 04/21/2022    SPECGRAV >=1.030 04/21/2022    GLUCOSEU Negative 04/21/2022    GLUCOSEU >=1000 mg/dL 08/31/2010       Radiology:  XR CHEST PORTABLE   Final Result   Low lung volumes with patchy airspace disease which may represent multifocal   atelectasis. Overall stable appearing chest.  Possible trace effusions. XR CHEST PORTABLE   Final Result   Relatively stable appearance of the chest with bibasilar airspace opacities. XR CHEST PORTABLE   Final Result   Low volume study with bilateral atelectasis or airspace disease, similar to   prior, with persistent small pleural effusions. XR CHEST PORTABLE   Final Result   Stable chest.         XR CHEST PORTABLE   Final Result   Endotracheal tube tip projects at the mid intrathoracic trachea. Otherwise   stable chest.         XR CHEST PORTABLE   Final Result   Suboptimal examination due to patient rotation. The chest appears stable. XR CHEST PORTABLE   Final Result   Endotracheal tube tip projects at the thoracic inlet. Otherwise stable chest.         XR CHEST PORTABLE   Final Result   Stable chest.         XR CHEST PORTABLE   Final Result   Stable endotracheal tube located approximately 3.3 cm above the quintin. Low lung volumes. Suspected small right pleural effusion. XR CHEST PORTABLE   Final Result   Endotracheal tube in satisfactory position above the quintin      Bibasilar hypoaeration persist         CT ABDOMEN PELVIS W IV CONTRAST Additional Contrast? None   Final Result   1. Moderate amount of ascites with 3rd spacing including edematous changes   throughout the abdomen and anasarca. 2. Delgado in place. Diffuse bladder wall thickening. Correlate for   underlying cystitis.    3. No acute bowel pathology. Mild gastric distension. Diverticulosis with   no acute features. 4. Mild renal cortical scarring and asymmetric right renal atrophy, stable. No hydronephrosis. CT CHEST PULMONARY EMBOLISM W CONTRAST   Final Result   1. No evidence of acute pulmonary embolism. There is some thickening and   mild irregularity in the pulmonary arteries in the left lower lobe which may   represent chronic pulmonary embolism or secondary appearance to inflammation. No evidence of aortic aneurysm or dissection. 2. Moderate right effusion and right lower lobe atelectatic and/or   consolidative changes. Pneumonia is likely. There is severe loss of volume   in the right lung. Trace left effusion and left lower lobe atelectatic   changes are seen. 3. Moderate ascites around the spleen and liver. XR CHEST PORTABLE   Final Result   Low lung volumes. Bilateral pleural effusions with bibasilar volume loss,   right greater than left. No overt failure. XR CHEST PORTABLE    (Results Pending)   XR CHEST PORTABLE    (Results Pending)       Blood - 4/21 - Group A , strep pyogenes and Enterococcus Faecalis   Repeat blood - NG 4/23    Urine - Ecoli , Enterococcus Faecalis     Sputum- Acinetobacter baumannii    Assessment/Plan:    Septic shock. Enterococcus faecalis and strep pyogenes bacteremia. Right lower extremity cellulitis  Chronic pressure ulcers on the back with exposed hardware      Source could be HD line vs exposed hardware  Patient was on vancomycin, Merrem and clindamycin.  changed to  fortaz , steffany nebs for acinetobacter,  Also on Vanc. ID managing this. Severe hypotensive shock  needing sammi, vaso, dobutamine and levophed    on hydrocortisone for shock-->PO pred  Critical care managing  Improving hypotension, now only on levo  5/1 - back on levophed, s/p bronch   increasing WBC and fever- f.w cx      End-stage renal disease on hemodialysis.   Nephrology consulted  Initiated on CRRT   Fluid removal as tolerated     Acute hypoxic resp failure  Healthcare associated pneumonia. Intubated in the ICU on 4/22. Antibiotics as above. S/p bronch 5/1/22 given worsening leucocytosis/fever  cx with acinetobacter      Chronic systolic congestive heart failure, EF 25 to 30%. Cardiology consulted  Hold home medications given hypotension  Off dobutamine   Poor overall prognosis      Acute metabolic encephalopathy resolved. Secondary to septic shock  Improving   Able to follow simple commands     Type 2 diabetes.   Lantus insulin and sliding scale insulin     History of DVT  Continue Eliquis 2.5 mg bid       Diet: Diet NPO  ADULT TUBE FEEDING; Orogastric; Renal Formula; Continuous; 35; Yes; 5; Q 4 hours; 40; 30; Q 4 hours; Protein; one proteinex P2Go TWICE daily via feeding tube  Code Status: Full Code    Dispo - cont care  updated family     Malissa Banks MD

## 2022-05-02 NOTE — PROGRESS NOTES
SBT of 5/5 attempted. Pt not taking big enough VTs. Ps increased to 8. Pt tolerating much better, VTs in the 300s. Continuing to monitor pt.

## 2022-05-02 NOTE — PLAN OF CARE
Nutrition Problem #1: Inadequate oral intake  Intervention: Food and/or Nutrient Delivery: Continue NPO,Continue Current Tube Feeding  Nutritional: Tolerate nutrition support at goal rate (goal is ongoing)

## 2022-05-02 NOTE — PROGRESS NOTES
CARDIOLOGY PROGRESS NOTE      Patient Name: David Goode  Date of admission: 4/21/2022  1:21 PM  Admission Dx: Hyperkalemia [E87.5]  Hypoglycemia [E16.2]  ESRD on dialysis ([facility] [N18.6, Z99.2]  Acute cystitis with hematuria [N30.01]  Septic shock ([facility] [address] [A41.9, R65.21]  Sepsis ([facility] [address] [A41.9]  Pneumonia due to infectious organism, unspecified laterality, unspecified part of lung [J18.9]  Reason for Consult:  Dyspnea, CHF  Requesting Physician: Lorelei Abreu MD  Primary Care physician: Low Aj MD    Subjective:     David Goode is a 49 y.o. patient with a prior medical history notable for ischemic cardiomyopathy with severe LV dysfunction with last known EF 25-30%, coronary artery disease status post multiple prior PCI to left main/LAD/circumflex, allergy to statin, diabetes mellitus, hypertension, hyperlipidemia, ESRD on hemodialysis starting February 2022, quadriplegia due to [address], history of pulmonary emboli, CVA, chronic anticoagulation, presenting originally 4/21/2022 with shortness of breath. Patient was initially evaluated by Dr. PENELOPE CURRIE of our service who found him to be hypotensive, bacteremic, with fevers. Diagnosed with septic shock and acute respiratory failure ultimately requiring mechanical ventilation. EKG on admission showed sinus tachycardia with prior inferior and anterolateral infarct. CT imaging was negative for pulmonary embolus, moderate right pleural effusion noted, right lower lobe consolidation, moderate ascites. Troponin elevated. proBNP elevated. Patient was initiated on dobutamine in addition to pressors for cardiac output support. Guideline directed medical therapies were held. No repeat cardiac testing warranted. SBT ongoing - difficulty today tolerating with hypoxia noted. Noted on no sedation at this time. Appears comfortable. Bronch with BAL yesterday +acinetobacter.  Worsening leukocytosis, moderate secretions, low dose levophed with soft BP's. Tolerating CRRT with 50cc/hr since Thursday. Home Medications:  Were reviewed and are listed in nursing record and/or below  Prior to Admission medications    Medication Sig Start Date End Date Taking?  Authorizing Provider   vitamin D (ERGOCALCIFEROL) 1.25 MG (35432 UT) CAPS capsule Take 50,000 Units by mouth once a week    Historical Provider, MD   cetirizine (ZYRTEC) 10 MG tablet Take 10 mg by mouth daily    Historical Provider, MD   apixaban (ELIQUIS) 2.5 MG TABS tablet Take 1 tablet by mouth 2 times daily TAKE ONE TABLET BY MOUTH TWICE A DAY 4/18/22   Shirley Zhu MD   sacubitril-valsartan (ENTRESTO) 24-26 MG per tablet Take 1 tablet by mouth 2 times daily 4/18/22   Shirley Zhu MD   metoprolol succinate (TOPROL XL) 25 MG extended release tablet Take 1 tablet by mouth daily 4/18/22 5/18/22  Shirley Zhu MD   torsemide (DEMADEX) 100 MG tablet TAKE ONE TABLET BY MOUTH DAILY except on dialysis days 4/18/22   Shirley Zhu MD   montelukast (SINGULAIR) 10 MG tablet TAKE ONE TABLET BY MOUTH DAILY 4/16/22   Paul Diego MD   pantoprazole (PROTONIX) 40 MG tablet TAKE ONE TABLET BY MOUTH DAILY 4/8/22   Carlo Altamirano MD   traZODone (DESYREL) 50 MG tablet TAKE ONE TABLET BY MOUTH ONCE NIGHTLY 4/8/22   Carlo Altamirano MD   LANTUS 100 UNIT/ML injection vial INJECT 55 UNITS UNDER THE SKIN TWO TIMES A DAY  Patient taking differently: 60 Units  3/24/22   Carlo Altamirano MD   promethazine (PHENERGAN) 25 MG tablet TAKE ONE TABLET BY MOUTH EVERY 6 HOURS AS NEEDED FOR NAUSEA 3/16/22   Carlo Altamirano MD   MAGNESIUM-OXIDE 400 (241.3 Mg) MG TABS tablet TAKE THREE TABLETS BY MOUTH TWICE A DAY  Patient taking differently: 1,200 mg 2 times daily  1/4/22   Padma Mendiola PA-C   Respiratory Therapy Supplies (NEBULIZER/TUBING/MOUTHPIECE) KIT 1 kit by Does not apply route daily as needed (SOB) 12/28/21   Paul Diego MD   triamcinolone (KENALOG) 0.1 % cream Apply 2 times daily to the rash on your arms. 12/22/21   Mynor Smith MD   nitroGLYCERIN (NITROSTAT) 0.4 MG SL tablet DISSOLVE 1 TAB UNDER TONGUE FOR CHEST PAIN - IF PAIN REMAINS AFTER 5 MIN, CALL 911 AND REPEAT DOSE. MAX 3 TABS IN 15 MINUTES 11/23/21   Khloe Pacheco MD   albuterol sulfate HFA (VENTOLIN HFA) 108 (90 Base) MCG/ACT inhaler Inhale 2 puffs into the lungs 4 times daily as needed for Wheezing 11/12/21   Deep Zazueta MD   albuterol (PROVENTIL) (5 MG/ML) 0.5% nebulizer solution Take 0.5 mLs by nebulization 4 times daily as needed for Wheezing 11/12/21 11/12/22  Deep Zazueta MD   insulin lispro (HUMALOG) 100 UNIT/ML injection vial Inject 20 Units into the skin 3 times daily (before meals) INJECT 20 UNITS UNDER THE SKIN THREE TIMES A DAY BEFORE MEALS + SLIDING SCALE 10/1/21   Selene Bundy PA-C   Menthol-Methyl Salicylate ([address] [facility]) 0.5-15 % CREA Apply topically as needed     Historical Provider, MD   Respiratory Therapy Supplies ([facility]) TRAE To be used PRN. 6/10/21   Deep Zazueta MD   polyethylene glycol (MIRALAX) 17 GM/SCOOP powder Take 1 scoop daily. Patient taking differently: as needed Take 1 scoop daily. 9/4/20   Khloe Pacheco MD   senna (SENNA-LAX) 8.6 MG tablet TAKE TWO TABLETS BY MOUTH DAILY 8/28/20   Khloe Pacheco MD   docusate sodium (STOOL SOFTENER) 100 MG capsule TAKE TWO CAPSULES BY MOUTH DAILY  Patient taking differently: 50 mg TAKE TWO CAPSULES BY MOUTH DAILY 8/28/20   Khloe Pacheco MD   Alirocumab (PRALUENT) 150 MG/ML SOAJ Inject 150 mg into the skin every 30 days  7/21/20   Historical Provider, MD   Insulin Syringe-Needle U-100 ([employer] INSULIN SYRINGE) 31G X 5/16\" 0.5 ML MISC Use 4 times daily. DX: E11.9 1/30/20   Khloe Pacheco MD   Insulin Pen Needle ([employer] PEN NEEDLES 31G) 31G X 8 MM MISC Use with Humalog.   DX:E11.9 12/17/19   Khloe Pacheco MD   aspirin 81 MG tablet Take 81 mg by mouth nightly  10/16/17   Historical Provider, MD        CURRENT Medications:  cefTAZidime (FORTAZ) 1,000 mg in dextrose 5 % 50 mL IVPB, 3 times per day  tobramycin (PF) (RACHANA) nebulizer solution 300 mg, BID  insulin glargine (LANTUS) injection vial 30 Units, Nightly  insulin glargine (LANTUS) injection vial 30 Units, QAM  oxyCODONE-acetaminophen (PERCOCET) 5-325 MG per tablet 1 tablet, Q4H PRN  sennosides-docusate sodium (SENOKOT-S) 8.6-50 MG tablet 2 tablet, BID  carboxymethylcellulose PF (THERATEARS) 1 % ophthalmic gel 1 drop, Q4H PRN   And  lubrifresh P.M. (artificial tears) ophthalmic ointment, Q4H PRN  traZODone (DESYREL) tablet 50 mg, Nightly PRN  midodrine (PROAMATINE) tablet 10 mg, TID WC  insulin lispro (HUMALOG) injection vial 0-18 Units, Q4H  vancomycin (VANCOCIN) intermittent dosing (placeholder), RX Placeholder  DOBUTamine  mg in dextrose 5 % 250 mL infusion, Continuous  ipratropium-albuterol (DUONEB) nebulizer solution 1 ampule, Q4H PRN  dextrose bolus (hypoglycemia) 10% 125 mL, PRN   Or  dextrose bolus (hypoglycemia) 10% 250 mL, PRN  vasopressin 20 Units in dextrose 5 % 100 mL infusion, Continuous  chlorhexidine (PERIDEX) 0.12 % solution 15 mL, BID  ketamine (KETALAR) 500 mg in sodium chloride 0.9 % 250 mL infusion, Continuous  pantoprazole (PROTONIX) injection 40 mg, Daily  glucagon (rDNA) injection 1 mg, PRN  dextrose 5 % solution, PRN  prismaSATE BGK 4/2.5 dialysis solution, Continuous  prismaSATE BGK 4/2.5 dialysis solution, Continuous  prismaSATE BGK 4/2.5 dialysis solution, Continuous  potassium chloride 20 mEq/50 mL IVPB (Central Line), PRN  magnesium sulfate 1000 mg in dextrose 5% 100 mL IVPB, PRN  calcium gluconate 1,000 mg in dextrose 5 % 100 mL IVPB, PRN   Or  calcium gluconate 2,000 mg in dextrose 5 % 100 mL IVPB, PRN   Or  calcium gluconate 3,000 mg in dextrose 5 % 100 mL IVPB, PRN   Or  calcium gluconate 4,000 mg in dextrose 5 % 100 mL IVPB, PRN  sodium phosphate 6 mmol in sodium chloride 0.9 % 250 mL IVPB, PRN   Or  sodium phosphate 12 mmol in dextrose 5 % 250 mL IVPB, PRN   Or  sodium phosphate 18 mmol in dextrose 5 % 500 mL IVPB, PRN   Or  sodium phosphate 24 mmol in dextrose 5 % 500 mL IVPB, PRN  ipratropium-albuterol (DUONEB) nebulizer solution 1 ampule, Q4H  glucose chewable tablet 16 g, PRN  midazolam (VERSED) injection 2 mg, Q1H PRN  fentaNYL (SUBLIMAZE) injection 50 mcg, Q1H PRN  menthol-zinc oxide (CALMOSEPTINE) 0.44-20.6 % ointment, Daily  norepinephrine (LEVOPHED) 16 mg in dextrose 5 % 250 mL infusion, Continuous  apixaban (ELIQUIS) tablet 2.5 mg, BID  aspirin chewable tablet 81 mg, Nightly  sodium chloride flush 0.9 % injection 5-40 mL, 2 times per day  sodium chloride flush 0.9 % injection 5-40 mL, PRN  0.9 % sodium chloride infusion, PRN  ondansetron (ZOFRAN-ODT) disintegrating tablet 4 mg, Q8H PRN   Or  ondansetron (ZOFRAN) injection 4 mg, Q6H PRN  polyethylene glycol (GLYCOLAX) packet 17 g, Daily PRN  acetaminophen (TYLENOL) tablet 650 mg, Q6H PRN   Or  acetaminophen (TYLENOL) suppository 650 mg, Q6H PRN  perflutren lipid microspheres (DEFINITY) injection 1.65 mg, ONCE PRN        Allergies:  Benadryl [diphenhydramine hcl], Keflex [cephalexin], Statins, Morphine, Penicillins, and Sulfa antibiotics     Review of Systems:   A 14 point review of symptoms completed. Pertinent positives identified in the HPI, all other review of symptoms negative. Objective:     Vitals:    05/02/22 0800 05/02/22 0900 05/02/22 0928 05/02/22 0958   BP: (!) 85/55 106/70     Pulse: 84 88     Resp: 20 22 20 23   Temp: 97.2 °F (36.2 °C)      TempSrc: Bladder      SpO2:  93%     Weight:       Height:          Weight: 261 lb 8 oz (118.6 kg)       PHYSICAL EXAM:    General:  On vent. Nods head yes/no to questions, appears comfortable. Head:  Normocephalic, atraumatic   Eyes:  Conjunctiva/corneas clear, anicteric sclerae    Nose: Nares normal, no drainage or sinus tenderness   Throat: No abnormalities of the lips, oral mucosa or tongue.     Neck: Trachea midline. Neck supple with no lymphadenopathy, thyroid not enlarged, symmetric, no tenderness/mass/nodules    Lungs:   Coarse BS bilat anteriorly, on 50% FiO2   Chest Wall:  No deformity or tenderness to palpation   Heart:  Regular rate and rhythm with PVCs, normal S1, normal S2, no murmur, no rub, no S3/S4, PMI non-palp. Abdomen:   Obese, Soft, non-tender, with normoactive bowel sounds. No masses, no hepatosplenomegaly   Extremities: Mild pitting edema LLE. Cool LLE with dopplerable pulses. Stump WWP. Various areas of breakdown/dressed. Vascular: 2+ radial, non-palpable dorsalis pedis and posterior tibial pulses LLE. Brisk carotid upstrokes without carotid bruit. Skin: Skin color, texture, turgor are normal with no rashes or ulceration. Pysch: Cannot full assess   Neurologic: Cannot full assess. Following commands with minimal sedation.         Labs:   CBC:   Lab Results   Component Value Date    WBC 33.1 05/02/2022    RBC 3.86 05/02/2022    HGB 10.3 05/02/2022    HCT 33.1 05/02/2022    MCV 85.8 05/02/2022    RDW 19.6 05/02/2022     05/02/2022     CMP:  Lab Results   Component Value Date     05/02/2022    K 4.1 05/02/2022    K 3.8 04/29/2022    CL 97 05/02/2022    CO2 26 05/02/2022    BUN 21 05/02/2022    CREATININE <0.5 05/02/2022    GFRAA >60 05/02/2022    GFRAA >60 05/21/2013    AGRATIO 1.0 04/21/2022    LABGLOM >60 05/02/2022    GLUCOSE 118 05/02/2022    PROT 8.0 04/21/2022    PROT 8.0 10/04/2011    CALCIUM 8.2 05/02/2022    BILITOT 0.8 04/21/2022    ALKPHOS 249 04/21/2022    AST 23 04/21/2022    ALT 12 04/21/2022     PT/INR:  No results found for: PTINR  HgBA1c:  Lab Results   Component Value Date    LABA1C 6.3 04/23/2022     Lab Results   Component Value Date    CKTOTAL 28 (L) 09/06/2021    CKMB <0.2 09/01/2010    TROPONINI 0.27 (H) 04/21/2022         Interval Testing/Data:     Telemetry personally reviewed: NSR 80's    TTE 4/11/22  Conclusions      Summary   Definity contrast administered. Left ventricle is dilated with normal wall thickness. Left ventricular systolic function is severely reduced with ejection   fraction estimated at 25-30 %. There is akinesis of the apex. There is septal flattening present. Severe anteroseptal and inferoseptal wall hypokinesis. Other segments are   minimally hypokinetic. TTE 1/10/22   Conclusions      Summary   This is a limited study for ischemic cardiomyopathy. Definity contrast administered. LV systolic function is severely reduced with EF estimated at 25-30%. Severe global hypokinesis with akinesis of distal apex. The aortic root is dilated 4.5cm.   3-8-2021 30-35% globally moderately reduced with regional variation. TTE 3/8/21  Conclusions      Summary   Technically difficult examination. The left ventricular systolic function is moderately reduced with an   ejection fraction of 30-35 %. Definity contrast administered with no evidence of left ventricular mass or   thrombus noted. Moderate global hypokinesis with regional variation. Left ventricular diastolic filling pressure are indeterminate. The right ventricle is normal in size with decreased function. Trace mitral and pulmonic regurgitation. Last echo on 5/16/2019 showed EF 30-35%. Ascending aorta is mildly dilated at 4.0cm. Gissel Cheng 3/7/2018 suggestive of infarct in basal anterior septum, apex, and inferior wall. no ischemia noted, EF 46%.         Impression and Plan      Ischemic cardiomyopathy with severe LV dysfunction  Coronary artery disease status post multiple prior PCI with inferior and anterolateral infarction per prior Lexiscan  Acute hypoxic respiratory failure  Mixed cardiogenic/septic shock  Polymicrobial bacteremia (Strep, enterococcus faecalis) - suspected due to decubitus ulcer, not typical for HD line infection  Probable ventilator associated pneumonia    Worsening leukocytosis  End-stage renal disease on hemodialysis as outpatient, CRRT this admission  Decubitus ulcer  Obstructive sleep apnea  Paraplegia  History of venous thromboembolism   Obesity      Patient Active Problem List   Diagnosis    Mixed hyperlipidemia    Coronary artery disease involving native coronary artery of native heart without angina pectoris    [facility], complete ([facility])    Colostomy in place [facility])    Chronic back pain    Arthritis    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 ([facility] [address]    Iron deficiency anemia due to chronic blood loss    Lymphedema of both lower extremities    Neurogenic bladder    Neurogenic bowel    Hypergranulation    Dehiscence of surgical wound of T-spine, initial encounter    Onychomycosis    Dystrophic nail    Ischemic cardiomyopathy    Gitelman syndrome    LIBORIO on CPAP    Bullous cellulitis of right thigh    Hyperkalemia    Moderate persistent asthma without complication    Choledocholithiasis    Bacteremia due to Streptococcus pyogenes ([facility])    Hyponatremia    Acute on chronic combined systolic and diastolic CHF (congestive heart failure) ([facility])    S/P BKA (below knee amputation) unilateral, right ([facility])    Paroxysmal atrial fibrillation ([facility])    Pressure ulcer of left leg, stage 4 ([facility])    Anasarca associated with disorder of kidney    ESRD on dialysis ([facility] [address]    Septic shock ([facility])    Cardiogenic shock ([address]    Atelectasis of right lower lobe    Bacteremia    Acute cystitis with hematuria    Streptococcal toxic shock syndrome ([address]    Pneumonia due to infectious organism    Mucus plugging of bronchi    Hospital-acquired pneumonia       PLAN:  -continue on eliquis, aspirin  -weaned from inotrope therapy. Continues on midodrine for BP support  -Levophed for septic shock to maintain MAP of 65, is now off vasopressin/epinephrine/dobutamine  -holding GDMT including Entresto, beta-blocker; look to restart the Metoprolol XL low dose once weaned from pressors with stable BP/HR.  Re-evaluate entresto as OP if unable to restart low dose this admission upon further recovery.    -volume management per renal service, presently CRRT 50cc/hr.   -change in abx today per crit care for acinetobacter by resp cx. Cardiology service will sign off. Please call for additional concerns should they arise. We will arrange follow up with Dr. Yessi Johnson. I will address the patient's cardiac risk factors and adjusted pharmacologic treatment as needed. In addition, I have reinforced the need for patient directed risk factor modification. All questions and concerns were addressed to the patient/family. Alternatives to my treatment were discussed. Thank you for allowing us to participate in the care of Charlene Walker. Please call me with any questions [phone].     Bernadine Mancilla MD, [facility]  Cardiovascular Disease  [facility]  [phone] [facility]  [phone] [address] [place] [address]  5/2/2022 9:04 AM

## 2022-05-02 NOTE — PROGRESS NOTES
RT Inhaler-Nebulizer Bronchodilator Protocol Note    There is a bronchodilator order in the chart from a provider indicating to follow the RT Bronchodilator Protocol and there is an Initiate RT Inhaler-Nebulizer Bronchodilator Protocol order as well (see protocol at bottom of note). CXR Findings:  XR CHEST PORTABLE    Result Date: 5/2/2022  No significant change compared to chest x-ray from 05/01/2022. XR CHEST PORTABLE    Result Date: 5/1/2022  Low lung volumes with patchy airspace disease which may represent multifocal atelectasis. Overall stable appearing chest.  Possible trace effusions. The findings from the last RT Protocol Assessment were as follows:   History Pulmonary Disease: (P) Chronic pulmonary disease  Respiratory Pattern: (P) Dyspnea on exertion or RR 21-25 bpm  Breath Sounds: (P) Slightly diminished and/or crackles  Cough: (P) Weak, productive  Indication for Bronchodilator Therapy: (P) Decreased or absent breath sounds  Bronchodilator Assessment Score: (P) 8    Aerosolized bronchodilator medication orders have been revised according to the RT Inhaler-Nebulizer Bronchodilator Protocol below. Respiratory Therapist to perform RT Therapy Protocol Assessment initially then follow the protocol. Repeat RT Therapy Protocol Assessment PRN for score 0-3 or on second treatment, BID, and PRN for scores above 3. No Indications - adjust the frequency to every 6 hours PRN wheezing or bronchospasm, if no treatments needed after 48 hours then discontinue using Per Protocol order mode. If indication present, adjust the RT bronchodilator orders based on the Bronchodilator Assessment Score as indicated below.   Use Inhaler orders unless patient has one or more of the following: on home nebulizer, not able to hold breath for 10 seconds, is not alert and oriented, cannot activate and use MDI correctly, or respiratory rate 25 breaths per minute or more, then use the equivalent nebulizer order(s) with same Frequency and PRN reasons based on the score. If a patient is on this medication at home then do not decrease Frequency below that used at home. 0-3 - enter or revise RT bronchodilator order(s) to equivalent RT Bronchodilator order with Frequency of every 4 hours PRN for wheezing or increased work of breathing using Per Protocol order mode. 4-6 - enter or revise RT Bronchodilator order(s) to two equivalent RT bronchodilator orders with one order with BID Frequency and one order with Frequency of every 4 hours PRN wheezing or increased work of breathing using Per Protocol order mode. 7-10 - enter or revise RT Bronchodilator order(s) to two equivalent RT bronchodilator orders with one order with TID Frequency and one order with Frequency of every 4 hours PRN wheezing or increased work of breathing using Per Protocol order mode. 11-13 - enter or revise RT Bronchodilator order(s) to one equivalent RT bronchodilator order with QID Frequency and an Albuterol order with Frequency of every 4 hours PRN wheezing or increased work of breathing using Per Protocol order mode. Greater than 13 - enter or revise RT Bronchodilator order(s) to one equivalent RT bronchodilator order with every 4 hours Frequency and an Albuterol order with Frequency of every 2 hours PRN wheezing or increased work of breathing using Per Protocol order mode.        Electronically signed by Juana Flores RCP on 5/2/2022 at 3:22 PM

## 2022-05-02 NOTE — PROGRESS NOTES
This note also relates to the following rows which could not be included:  Vt Exhaled - Cannot attach notes to unvalidated device data  Rate Measured - Cannot attach notes to unvalidated device data  Minute Volume - Cannot attach notes to unvalidated device data  Peak Flow - Cannot attach notes to unvalidated device data  Pressure Support - Cannot attach notes to unvalidated device data  Peak Inspiratory Pressure - Cannot attach notes to unvalidated device data  Sensitivity - Cannot attach notes to unvalidated device data  High Peep/I Pressure - Cannot attach notes to unvalidated device data  I Time/ I Time % - Cannot attach notes to unvalidated device data  Low Minute Volume Alarm - Cannot attach notes to unvalidated device data  Mean Airway Pressure - Cannot attach notes to unvalidated device data       05/02/22 0308   Patient Observation   Pulse 85   Resp 20   SpO2 100 %   Breath Sounds   Right Upper Lobe Rhonchi   Right Middle Lobe Rhonchi   Right Lower Lobe Diminished   Left Upper Lobe Rhonchi   Left Lower Lobe Diminished   Airway Clearance   Suction ET Tube   Suction Device Inline suction catheter   Sputum Method Obtained Endotracheal   Sputum Amount Moderate   Sputum Color/Odor Yellow; Other (comment)  (red)   Sputum Consistency Thick   Vent Information   Vent Mode AC/VC   $Ventilation $Subsequent Day   Vent Settings   FiO2  70 %  (decreased to 50)   Resp Rate (Set) 18 bmp   Vt (Set, mL) 490 mL   PEEP/CPAP (cmH2O) 5   Trigger Sensitivity Flow (L/min) 3 L/min   Vent Patient Data (Readings)   Resp Rate Observed 20   Vt (Observed, mL) 485 mL   Minute Ventilation (l/min) 10.3 l/min   I:E Ratio 1:1.5   PIP Observed (cm H2O) 29 cm H2O   MAP (cm H2O) 14   Plateau Pressure (cm H2O) 28 cm H2O   Vent Alarm Settings   High Pressure  40 cmH2O   Ve Max 20   Ve Min 4   Vt Low  300 mL   RR High 40 br/min   Apnea (Secs) 20 secs   Ambu Bag With Mask At Bedside Yes   Additional Respiratory Assessments   Position Semi-Mejía's Humidification Source Heated wire   Humidification Temp 37   Circuit Condensation Drained   ETT (adult)   Placement Date/Time: 04/22/22 0945   Present on Admission/Arrival: No  Placed By: Licensed provider  Placement Verified By: Chest X-ray  Preoxygenation: Yes  Mask Ventilation: Mask ventilation not attempted (0)  Technique: Rapid sequence;Direct laryng. ..    Secured At 26 cm   Measured From Lips   ETT Placement Center   Secured By Commercial tube simon   Site Assessment Dry

## 2022-05-02 NOTE — PROGRESS NOTES
05/01/22 2330   Patient Observation   Pulse 85   Resp 20   SpO2 100 %   Breath Sounds   Right Upper Lobe Rhonchi   Right Middle Lobe Rhonchi   Right Lower Lobe Diminished   Left Upper Lobe Rhonchi   Left Lower Lobe Diminished   Airway Clearance   Suction ET Tube   Suction Device Inline suction catheter   Sputum Method Obtained Endotracheal   Sputum Amount Moderate   Sputum Color/Odor Yellow; Other (comment)  (red)   Sputum Consistency Thick   Vent Settings   FiO2  70 %   Resp Rate (Set) 18 bmp   Vt (Set, mL) 490 mL   PEEP/CPAP (cmH2O) 5   Trigger Sensitivity Flow (L/min) 3 L/min   Vent Patient Data (Readings)   Resp Rate Observed 22   Vt (Observed, mL) 503 mL   Minute Ventilation (l/min) 10 l/min   I:E Ratio 1:1.30   PIP Observed (cm H2O) 28 cm H2O   MAP (cm H2O) 14   Plateau Pressure (cm H2O) 29 cm H2O   Vent Alarm Settings   High Pressure  40 cmH2O   Ve Max 20   Ve Min 4   Vt Low  300 mL   RR High 40 br/min   Apnea (Secs) 20 secs   Ambu Bag With Mask At Bedside Yes   Vent Observations - Device Integration   Vt Exhaled 475 mL   Rate Measured 25 br/min   Minute Volume 9.69 Liters   Peak Flow 50 L/min   Pressure Support 0 cmH20   Peak Inspiratory Pressure 37 cmH2O   Sensitivity 3   High Peep/I Pressure 0   I Time/ I Time % 0 s   Low Minute Volume Alarm 4 L/min   Mean Airway Pressure 16 cmH20   Additional Respiratory Assessments   Position Semi-Mejía's   Humidification Source Heated wire   Humidification Temp 37   Circuit Condensation Drained   ETT (adult)   Placement Date/Time: 04/22/22 0945   Present on Admission/Arrival: No  Placed By: Licensed provider  Placement Verified By: Chest X-ray  Preoxygenation: Yes  Mask Ventilation: Mask ventilation not attempted (0)  Technique: Rapid sequence;Direct laryng. ..    Secured At 26 cm   Measured From Lips   ETT Placement Center  (moved to center)   Secured By Commercial tube simon   Site Assessment Dry

## 2022-05-03 NOTE — PROGRESS NOTES
Pharmacy Vancomycin Consult     Vancomycin Day: 11  Current Dosing: Pulse  Current indication: bloodstream infection    Recent Labs     05/02/22  0258 05/02/22  0909 05/02/22  2136 05/03/22  0317   BUN 21*   < > 23* 26*   CREATININE <0.5*   < > 0.6* 0.6*   WBC 33.1*  --   --  17.9*    < > = values in this interval not displayed. Estimated Creatinine Clearance: 191 mL/min (A) (based on SCr of 0.6 mg/dL (L)). Trough: 19.9    Assessment/Plan:  Will dose 500 mg x1 and recheck a level with AM labs tomorrow.      Dwight Ravi, CamD, [facility], 5/3/2022 6:39 AM

## 2022-05-03 NOTE — PROGRESS NOTES
Hospitalist Progress Note      PCP: Roland Ojeda MD    Date of Admission: 4/21/2022    Subjective:     49 y.o. male with hx of ischemic CM, paraplegia, IDDM, ESRD on HD presents with bacteremia and decubitus ulcers, septic shock     Improving sepsis from last week, off sammi, vaso, dobutamine, now remains on levophed, weaning pressors   No fevers  Improving wbc    Ongoing CRRT with fluid removal   No fevers      Pt seen sedated on vent support resting , family at bedside     Medications:  Reviewed    Infusion Medications    vasopressin (Septic Shock) infusion Stopped (04/29/22 0008)    dextrose      prismaSATE BGK 4/2.5 500 mL/hr at 05/03/22 0710    prismaSATE BGK 4/2.5 500 mL/hr at 05/03/22 0710    prismaSATE BGK 4/2.5 500 mL/hr at 05/03/22 0710    norepinephrine 5 mcg/min (05/03/22 0700)    sodium chloride Stopped (05/02/22 1630)     Scheduled Medications    cefTAZidime (FORTAZ) IV  1,000 mg IntraVENous 3 times per day    tobramycin (PF)  300 mg Nebulization BID    insulin glargine  30 Units SubCUTAneous Nightly    insulin glargine  30 Units SubCUTAneous QAM    sennosides-docusate sodium  2 tablet Oral BID    midodrine  10 mg Oral TID WC    insulin lispro  0-18 Units SubCUTAneous Q4H    vancomycin (VANCOCIN) intermittent dosing (placeholder)   Other RX Placeholder    chlorhexidine  15 mL Mouth/Throat BID    pantoprazole  40 mg IntraVENous Daily    ipratropium-albuterol  1 ampule Inhalation Q4H    menthol-zinc oxide   Topical Daily    apixaban  2.5 mg Oral BID    aspirin  81 mg Oral Nightly    sodium chloride flush  5-40 mL IntraVENous 2 times per day     PRN Meds: oxyCODONE-acetaminophen, carboxymethylcellulose PF **AND** artificial tears, traZODone, ipratropium-albuterol, dextrose bolus (hypoglycemia) **OR** dextrose bolus (hypoglycemia), glucagon (rDNA), dextrose, potassium chloride, magnesium sulfate, calcium gluconate **OR** calcium gluconate **OR** calcium gluconate **OR** calcium gluconate, sodium phosphate IVPB **OR** sodium phosphate IVPB **OR** sodium phosphate IVPB **OR** sodium phosphate IVPB, glucose, midazolam, fentanNYL, sodium chloride flush, sodium chloride, ondansetron **OR** ondansetron, polyethylene glycol, acetaminophen **OR** acetaminophen, perflutren lipid microspheres      Intake/Output Summary (Last 24 hours) at 5/3/2022 0749  Last data filed at 5/3/2022 0700  Gross per 24 hour   Intake 2427 ml   Output 4797 ml   Net -2370 ml       Physical Exam Performed:    BP (!) 96/53   Pulse 74   Temp 98.5 °F (36.9 °C) (Bladder)   Resp 18   Ht 6' 2\" (1.88 m)   Wt 256 lb 3.2 oz (116.2 kg)   SpO2 95%   BMI 32.89 kg/m²       General appearance:  intubated sedated   HEENT: NAD, oral ETT and OG noted   Neck: Supple, . No jugular venous distention. Trachea midline. Respiratory: Diminished breath sounds bilaterally  Cardiovascular: S 1 s2 heard, Tachycardic  Right chest HD catheter and Port noted  Abdomen: Soft, non-tender, non-distended with normal bowel sounds. Colostomy noted in left LQ  Musculoskeletal: Right BKA with stump healthy. Superficial skin ulcers with no active infection noted  Left LE edema with superficial ulceration noted   Skin: see Epic wound pictures, chronic exposed hardware in lower thoracic and lumbar region   Neurologic: intubated and sedated     Labs:   Recent Labs     05/01/22  0333 05/02/22  0258 05/03/22  0317   WBC 26.2* 33.1* 17.9*   HGB 10.7* 10.3* 10.5*   HCT 34.5* 33.1* 33.9*    152 185     Recent Labs     05/02/22  1505 05/02/22  2136 05/03/22  0317   * 132* 132*   K 3.8 4.0 3.9   CL 98* 97* 96*   CO2 25 25 26   BUN 24* 23* 26*   CREATININE 0.6* 0.6* 0.6*   CALCIUM 8.6 8.3 8.4   PHOS 2.3* 2.4* 2.7     No results for input(s): AST, ALT, BILIDIR, BILITOT, ALKPHOS in the last 72 hours. No results for input(s): INR in the last 72 hours. No results for input(s): Bryanna Breeze in the last 72 hours.     Urinalysis:      Lab Results   Component Value Date    NITRU Negative 04/21/2022    WBCUA 21-50 04/21/2022    BACTERIA 4+ 04/21/2022    RBCUA  04/21/2022    BLOODU LARGE 04/21/2022    SPECGRAV >=1.030 04/21/2022    GLUCOSEU Negative 04/21/2022    GLUCOSEU >=1000 mg/dL 08/31/2010       Radiology:  XR CHEST PORTABLE   Final Result   Slight improved aeration of the right lung. Bilateral pleuroparenchymal   disease remains         XR CHEST PORTABLE   Final Result   No significant change compared to chest x-ray from 05/01/2022. XR CHEST PORTABLE   Final Result   Low lung volumes with patchy airspace disease which may represent multifocal   atelectasis. Overall stable appearing chest.  Possible trace effusions. XR CHEST PORTABLE   Final Result   Relatively stable appearance of the chest with bibasilar airspace opacities. XR CHEST PORTABLE   Final Result   Low volume study with bilateral atelectasis or airspace disease, similar to   prior, with persistent small pleural effusions. XR CHEST PORTABLE   Final Result   Stable chest.         XR CHEST PORTABLE   Final Result   Endotracheal tube tip projects at the mid intrathoracic trachea. Otherwise   stable chest.         XR CHEST PORTABLE   Final Result   Suboptimal examination due to patient rotation. The chest appears stable. XR CHEST PORTABLE   Final Result   Endotracheal tube tip projects at the thoracic inlet. Otherwise stable chest.         XR CHEST PORTABLE   Final Result   Stable chest.         XR CHEST PORTABLE   Final Result   Stable endotracheal tube located approximately 3.3 cm above the quintin. Low lung volumes. Suspected small right pleural effusion. XR CHEST PORTABLE   Final Result   Endotracheal tube in satisfactory position above the quintin      Bibasilar hypoaeration persist         CT ABDOMEN PELVIS W IV CONTRAST Additional Contrast? None   Final Result   1.  Moderate amount of ascites with 3rd spacing including edematous changes   throughout the abdomen and anasarca. 2. Delgado in place. Diffuse bladder wall thickening. Correlate for   underlying cystitis. 3. No acute bowel pathology. Mild gastric distension. Diverticulosis with   no acute features. 4. Mild renal cortical scarring and asymmetric right renal atrophy, stable. No hydronephrosis. CT CHEST PULMONARY EMBOLISM W CONTRAST   Final Result   1. No evidence of acute pulmonary embolism. There is some thickening and   mild irregularity in the pulmonary arteries in the left lower lobe which may   represent chronic pulmonary embolism or secondary appearance to inflammation. No evidence of aortic aneurysm or dissection. 2. Moderate right effusion and right lower lobe atelectatic and/or   consolidative changes. Pneumonia is likely. There is severe loss of volume   in the right lung. Trace left effusion and left lower lobe atelectatic   changes are seen. 3. Moderate ascites around the spleen and liver. XR CHEST PORTABLE   Final Result   Low lung volumes. Bilateral pleural effusions with bibasilar volume loss,   right greater than left. No overt failure. XR CHEST PORTABLE    (Results Pending)       Blood - 4/21 - Group A , strep pyogenes and Enterococcus Faecalis   Repeat blood - NG 4/23    Urine - Ecoli , Enterococcus Faecalis     Sputum- Acinetobacter baumannii    Assessment/Plan:    Septic shock. Enterococcus faecalis and strep pyogenes bacteremia. Right lower extremity cellulitis  Chronic pressure ulcers on the back with exposed hardware      Source could be HD line vs exposed hardware  Patient was on vancomycin, Merrem and clindamycin.  changed to  fortaz , steffany nebs for acinetobacter,  Also on Vanc. ID managing this.    Severe hypotensive shock  needing sammi, vaso, dobutamine and levophed    on hydrocortisone for shock-->weaned off   Critical care managing  Improving hypotension, now only on levo  Wbc better     End-stage renal disease on hemodialysis. Nephrology consulted  Initiated on CRRT   Fluid removal as tolerated     Acute hypoxic resp failure  Healthcare associated pneumonia. Intubated in the ICU on 4/22. Antibiotics as above. S/p bronch 5/1/22 given worsening leucocytosis/fever  cx with acinetobacter      Chronic systolic congestive heart failure, EF 25 to 30%. Ischemic CM/CAD s/p previous stents   Cardiology consulted  Hold home medications given hypotension  Off dobutamine   Considering to resume BB ,   Resume ASA , statins  Poor overall prognosis      Acute metabolic encephalopathy resolved. Secondary to septic shock  Improving   Able to follow simple commands     Type 2 diabetes.   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 Jeff Blake     Paraplegia  Neurogenic bladder   - bed bound   - supportive care  - intermittent self catherizations , now has langley       Diet: Diet NPO  ADULT TUBE FEEDING; Orogastric; Renal Formula; Continuous; 35; Yes; 5; Q 4 hours; 40; 30; Q 4 hours; Protein; one proteinex P2Go TWICE daily via feeding tube  Code Status: Full Code    Dispo - cont care  updated family     Pari Norton MD

## 2022-05-03 NOTE — PROGRESS NOTES
05/02/22 2341   Patient Observation   Pulse 80   Resp 20   SpO2 95 %   Breath Sounds   Right Upper Lobe Rhonchi   Right Middle Lobe Rhonchi   Right Lower Lobe Diminished   Left Upper Lobe Rhonchi   Left Lower Lobe Diminished   Airway Clearance   Suction ET Tube   Suction Device Inline suction catheter   Sputum Amount Small   Sputum Color/Odor Yellow   Sputum Consistency Thick   Vent Information   Vent Mode AC/VC   Vent Settings   FiO2  45 %   Resp Rate (Set) 18 bmp   Vt (Set, mL) 490 mL   PEEP/CPAP (cmH2O) 5   Trigger Sensitivity Flow (L/min) 3 L/min   Vent Patient Data (Readings)   Resp Rate Observed 20   Vt (Observed, mL) 482 mL   Minute Ventilation (l/min) 8.8 l/min   I:E Ratio 1:1.80   PIP Observed (cm H2O) 29 cm H2O   Plateau Pressure (cm H2O) 25 cm H2O   Vent Alarm Settings   High Pressure  40 cmH2O   RR High 40 br/min   Vent Observations - Device Integration   Vt Exhaled 491 mL   Rate Measured 20 br/min   Minute Volume 9.68 Liters   Peak Flow 50 L/min   Pressure Support 0 cmH20   Peak Inspiratory Pressure 30 cmH2O   Sensitivity 3   High Peep/I Pressure 0   I Time/ I Time % 0 s   Low Minute Volume Alarm 4 L/min   Mean Airway Pressure 14 cmH20   Additional Respiratory Assessments   Position Semi-Mejía's   ETT (adult)   Placement Date/Time: 04/22/22 0945   Present on Admission/Arrival: No  Placed By: Licensed provider  Placement Verified By: Chest X-ray  Preoxygenation: Yes  Mask Ventilation: Mask ventilation not attempted (0)  Technique: Rapid sequence;Direct laryng. ..    ETT Placement Center
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
## 2022-05-03 NOTE — PROGRESS NOTES
Report given to [facility] for transfer in pt care. Pt remains intubated & on CRRT- removing 100 cc/hr pt tolerating well. Levophed gtt turned to 4 mcg. Pts son remains at bedside.    Gelacio Little RN, BSN

## 2022-05-03 NOTE — PROGRESS NOTES
Monitor: The patient's diabetes is improving with treatment.  Evaluation: No diagnostic tests required today.  Assessment/Treatment:  Continue current treatment/monitoring regimen.  Continue current treatment regimen.  Condition will be reassessed in 6 months   RT Inhaler-Nebulizer Bronchodilator Protocol Note    There is a bronchodilator order in the chart from a provider indicating to follow the RT Bronchodilator Protocol and there is an Initiate RT Inhaler-Nebulizer Bronchodilator Protocol order as well (see protocol at bottom of note). CXR Findings:  XR CHEST PORTABLE    Result Date: 5/2/2022  No significant change compared to chest x-ray from 05/01/2022. XR CHEST PORTABLE    Result Date: 5/1/2022  Low lung volumes with patchy airspace disease which may represent multifocal atelectasis. Overall stable appearing chest.  Possible trace effusions. The findings from the last RT Protocol Assessment were as follows:   History Pulmonary Disease: Chronic pulmonary disease  Respiratory Pattern: Dyspnea on exertion or RR 21-25 bpm  Breath Sounds: Slightly diminished and/or crackles  Cough: Weak, productive  Indication for Bronchodilator Therapy: Decreased or absent breath sounds  Bronchodilator Assessment Score: 8    Aerosolized bronchodilator medication orders have been revised according to the RT Inhaler-Nebulizer Bronchodilator Protocol below. Respiratory Therapist to perform RT Therapy Protocol Assessment initially then follow the protocol. Repeat RT Therapy Protocol Assessment PRN for score 0-3 or on second treatment, BID, and PRN for scores above 3. No Indications - adjust the frequency to every 6 hours PRN wheezing or bronchospasm, if no treatments needed after 48 hours then discontinue using Per Protocol order mode. If indication present, adjust the RT bronchodilator orders based on the Bronchodilator Assessment Score as indicated below.   Use Inhaler orders unless patient has one or more of the following: on home nebulizer, not able to hold breath for 10 seconds, is not alert and oriented, cannot activate and use MDI correctly, or respiratory rate 25 breaths per minute or more, then use the equivalent nebulizer order(s) with same Frequency and PRN reasons based on the score. If a patient is on this medication at home then do not decrease Frequency below that used at home. 0-3 - enter or revise RT bronchodilator order(s) to equivalent RT Bronchodilator order with Frequency of every 4 hours PRN for wheezing or increased work of breathing using Per Protocol order mode. 4-6 - enter or revise RT Bronchodilator order(s) to two equivalent RT bronchodilator orders with one order with BID Frequency and one order with Frequency of every 4 hours PRN wheezing or increased work of breathing using Per Protocol order mode. 7-10 - enter or revise RT Bronchodilator order(s) to two equivalent RT bronchodilator orders with one order with TID Frequency and one order with Frequency of every 4 hours PRN wheezing or increased work of breathing using Per Protocol order mode. 11-13 - enter or revise RT Bronchodilator order(s) to one equivalent RT bronchodilator order with QID Frequency and an Albuterol order with Frequency of every 4 hours PRN wheezing or increased work of breathing using Per Protocol order mode. Greater than 13 - enter or revise RT Bronchodilator order(s) to one equivalent RT bronchodilator order with every 4 hours Frequency and an Albuterol order with Frequency of every 2 hours PRN wheezing or increased work of breathing using Per Protocol order mode.          Electronically signed by Celestino Jackson RCP on 5/2/2022 at 8:01 PM

## 2022-05-03 NOTE — PROGRESS NOTES
AM assessment completed. AM labs reviewed. Pt on CRRT removing 100 cc/hr. Delgado in place for strict I/o, colostomy wnl. PIVs x2 WNL. R vas cath WNL. R chest port WNL. Pt awake & follows commands. TF at goal 40 cc/hr. Levophed gtt at [address]. No new orders at present time.

## 2022-05-03 NOTE — PROGRESS NOTES
ABG's drawn from Right Radial x1 attempt. Site prepped using proper technique. Modified Lenard's test performed and positive. Pressure held on site for 5 minutes after procedure. Pt tolerated well. Specimen sent to lab.

## 2022-05-03 NOTE — PROGRESS NOTES
High risk vesicant drug infusing:  ___x_______    Multiple incompatible medications infusing:  _________    CVP Monitoring:  _________    Extremely difficult IV access challenge:  __x______    Continued need for central line access:  ___x_______    Addressed with physician:  ___x_____    RIGHT PATIENT, RIGHT TIME, RIGHT LINE
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
## 2022-05-03 NOTE — PROGRESS NOTES
This note also relates to the following rows which could not be included:  Resp - Cannot attach notes to unvalidated device data  SpO2 - Cannot attach notes to unvalidated device data  FiO2  - Cannot attach notes to unvalidated device data       05/03/22 0313   Patient Observation   Pulse 85   Breath Sounds   Right Upper Lobe Rhonchi   Right Middle Lobe Rhonchi   Right Lower Lobe Diminished   Left Upper Lobe Rhonchi   Left Lower Lobe Diminished   Airway Clearance   Suction ET Tube   Suction Device Inline suction catheter   Vent Information   Vent Mode AC/VC   Vent Settings   Resp Rate (Set) 18 bmp   Vt (Set, mL) 490 mL   PEEP/CPAP (cmH2O) 5   Trigger Sensitivity Flow (L/min) 3 L/min   Vent Patient Data (Readings)   Resp Rate Observed 23   Vt (Observed, mL) 465 mL   Minute Ventilation (l/min) 12.1 l/min   I:E Ratio 1.40:1   PIP Observed (cm H2O) 29 cm H2O   Plateau Pressure (cm H2O) 25 cm H2O   Vent Alarm Settings   High Pressure  40 cmH2O   RR High 40 br/min   Vent Observations - Device Integration   Vt Exhaled 407 mL   Rate Measured 25 br/min   Minute Volume 12 Liters   Peak Flow 50 L/min   Pressure Support 0 cmH20   Peak Inspiratory Pressure 35 cmH2O   Sensitivity 3   High Peep/I Pressure 0   I Time/ I Time % 0 s   Low Minute Volume Alarm 4 L/min   Mean Airway Pressure 16 cmH20   Additional Respiratory Assessments   Position Semi-Mejía's   Humidification Source Heated wire   ETT (adult)   Placement Date/Time: 04/22/22 0945   Present on Admission/Arrival: No  Placed By: Licensed provider  Placement Verified By: Chest X-ray  Preoxygenation: Yes  Mask Ventilation: Mask ventilation not attempted (0)  Technique: Rapid sequence;Direct laryng. ..    ETT Placement Left

## 2022-05-03 NOTE — PROGRESS NOTES
Shift assessment completed, see flow sheet. Medications administered per MAR.     SpO2 96% on 45% and +5. Bilateral LS dimnished with rhonchi. Thick, creamy secretions suctioned via ETT. SpO2 occasionally drops d/t secretions per report.     RASS -1, opens eyes and nods appropriately to verbal stimulus. Follows commands in BUE.     Levophed infusing at 3 mcg/min to keep MAP > 65. PIV x3, VC, and port in place and functioning appropriately, dressings C/D/I.     CRRT running with a goal to remove  ml/hr per nephrology. OK to reduce fluid removal when needed to prevent further pressor requirements per Dr. David Hawk. VC functioning well w/o any access issues.     Tube feed infusing at goal rate and pt tolerating. Delgado catheter in place draining cloudy, tea colored urine. UO very minimal d/t hx CKD/HD and currently on CRRT. Colostomy in place draining brown, watery stool.     All lines and monitoring devices remain in place. Call light within reach. Bed locked and in lowest position with alarm on. Will continue to monitor.

## 2022-05-03 NOTE — PROGRESS NOTES
Pt placed on SBT 5/5, FiO2 45%         05/03/22 1008   NICU Vent Information   Vt (Set, mL) 0 mL   Vt Exhaled 477 mL   Resp Rate (Set) 0 bmp   Rate Measured 24 br/min   Minute Volume 11.9 Liters   Peak Flow 0 L/min   Pressure Support 8 cmH20   FiO2  45 %   Peak Inspiratory Pressure 30 cmH2O   I:E Ratio 1:1.20   Sensitivity 3   High Peep/I Pressure 0   PEEP/CPAP (cmH2O) 5   I Time/ I Time % 0 s   Mean Airway Pressure 16 cmH20   Additional Respiratory Assessments   Pulse 79   Resp 23   SpO2 96 %   Vent Alarm Settings   High Pressure  40 cmH2O   RR High 40 br/min

## 2022-05-03 NOTE — PROGRESS NOTES
Momo w/ Dr. Renetta Mcgregor in regards to ABG results- will plan on extubating patient tomorrow     Mik Medina RN, BSN

## 2022-05-03 NOTE — PROGRESS NOTES
EOS report given to LIONEL Mosley. Changes overnight:  · CRRT filter change completed around [id number]. Sterile technique used, 189 ml of blood returned to pt. CRRT back up and running w/o issue. · Pt had a couple episodes of desaturations to the mid 80s. ETT suctioned and 100% given and pt recovers quickly. Vent remains on 45% and +5.  · Pt having more frequent episodes of pain. PRN percocet and fentanyl administered multiple times throughout the night, fentanyl being used when pt c/o pain before percocet is available to administer. · 6 mmol of NaPhos given for a Phos of 2.4.  · WBC decreased from 33.1 to 17.9 this AM with abx changes yesterday. · CHG bath and wound care completed. No other changes noted. Pt stable at this time, care transferred.

## 2022-05-03 NOTE — PROGRESS NOTES
FiO2 decraesed to 40%           05/03/22 1505   NICU Vent Information   Vt (Set, mL) 490 mL   Vt Exhaled 488 mL   Resp Rate (Set) 18 bmp   Rate Measured 18 br/min   Minute Volume 8.83 Liters   Peak Flow 50 L/min   Pressure Support 0 cmH20   FiO2  40 %   Peak Inspiratory Pressure 26 cmH2O   I:E Ratio 1:2.10   Sensitivity 3   High Peep/I Pressure 0   PEEP/CPAP (cmH2O) 5   I Time/ I Time % 0 s   Mean Airway Pressure 12 cmH20   Cough/Sputum   Sputum How Obtained None   Breath Sounds   Right Upper Lobe Clear;Diminished   Right Middle Lobe Diminished   Right Lower Lobe Diminished   Left Upper Lobe Clear;Diminished   Left Lower Lobe Diminished   Additional Respiratory Assessments   Pulse 73   Resp 18   SpO2 96 %   Position Semi-Mejía's   Humidification Source Heated wire   Humidification Temp 37   Subglottic Suction Done Yes   Vent Alarm Settings   High Pressure  40 cmH2O   RR High 40 br/min   ETT (adult)   Placement Date/Time: 04/22/22 0945   Present on Admission/Arrival: No  Placed By: Licensed provider  Placement Verified By: Chest X-ray  Preoxygenation: Yes  Mask Ventilation: Mask ventilation not attempted (0)  Technique: Rapid sequence;Direct laryng. ..    Secured At 26 cm   Measured From Lips   ETT Placement Right   Secured By Commercial tube simon   Site Assessment Dry

## 2022-05-04 NOTE — PROGRESS NOTES
FiO2 decraesed to 45%  Peep remains at [address]         05/04/22 1602   NICU Vent Information   Vt (Set, mL) 490 mL   Vt Exhaled 492 mL   Resp Rate (Set) 18 bmp   Rate Measured 18 br/min   Minute Volume 8.85 Liters   Peak Flow 50 L/min   Pressure Support 0 cmH20   FiO2  45 %   Peak Inspiratory Pressure 30 cmH2O   I:E Ratio 1:2.10   Sensitivity 3   High Peep/I Pressure 0   PEEP/CPAP (cmH2O) 8   I Time/ I Time % 0 s   Mean Airway Pressure 15 cmH20   Cough/Sputum   Sputum How Obtained Endotracheal;Suctioned   Cough Non-productive   Breath Sounds   Right Upper Lobe Clear   Right Middle Lobe Diminished   Right Lower Lobe Diminished   Left Upper Lobe Clear   Left Lower Lobe Diminished   Additional Respiratory Assessments   Pulse 65   Resp 18   SpO2 97 %   Position Semi-Mejía's   Humidification Source Heated wire   Humidification Temp 37   Subglottic Suction Done Yes   Vent Alarm Settings   High Pressure  45 cmH2O   Low Minute Volume Alarm 4 L/min   RR High 40 br/min   Apnea (Secs) 20 secs   Ambu Bag With Mask At Bedside Yes   NICU Ventilator Associated Pneumonia Bundle   Elevation of Head of Bed Yes   Oral Care Completed Yes   Oral Care Performed Mouth suctioned   ETT (adult)   Placement Date/Time: 04/22/22 0945   Present on Admission/Arrival: No  Placed By: Licensed provider  Placement Verified By: Chest X-ray  Preoxygenation: Yes  Mask Ventilation: Mask ventilation not attempted (0)  Technique: Rapid sequence;Direct laryng. ..    Secured At 26 cm   Measured From Lips   ETT Placement Left   Secured By Commercial tube simon   Site Assessment Dry
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
## 2022-05-04 NOTE — PROGRESS NOTES
High risk vesicant drug infusing:  ____x______    Multiple incompatible medications infusing:  _________    CVP Monitoring:  _________    Extremely difficult IV access challenge:  ____x____    Continued need for central line access:  ____x______    Addressed with physician:  ____x____    RIGHT PATIENT, RIGHT TIME, RIGHT LINE

## 2022-05-04 NOTE — PROGRESS NOTES
Shift assessment completed, see flow sheet. Medications administered per MAR.     SpO2 96% on 40% and +5. Bilateral LS dimnished with rhonchi. Thick, creamy secretions suctioned via ETT. Secretions slightly improved.     RASS -1, opens eyes and nods appropriately to verbal stimulus. Follows commands in BUE. Passive ROM performed in extremities. Pt requesting pain medicine, percocet not yet available. IV fentanyl administered per orders.     Levophed infusing at 4 mcg/min to keep MAP > 65. BP holding well with low dose.     PIV, VC, and port in place and functioning appropriately, dressings C/D/I.     CRRT running with a goal to remove  ml/hr per nephrology. OK to reduce fluid removal when needed to prevent further pressor requirements. VC functioning well w/o any access issues.     Tube feed infusing at goal rate and pt tolerating. Delgado catheter in place draining cloudy, tea colored urine. UO very minimal d/t hx CKD/HD and currently on CRRT. Colostomy in place draining brown, watery stool.     All lines and monitoring devices remain in place. Call light within reach. Bed locked and in lowest position with alarm on. Will continue to monitor.

## 2022-05-04 NOTE — PROGRESS NOTES
Hospitalist Progress Note      PCP: Mariela Byrd MD    Date of Admission: 4/21/2022    Subjective:     49 y.o. male with hx of ischemic CM, paraplegia, IDDM, ESRD on HD presents with bacteremia and decubitus ulcers, septic shock     Improving sepsis from last week, off sammi, vaso, dobutamine, now remains on levophed,      Ongoing CRRT with fluid removal   No fevers  Hypoglycemic event     Pt seen sedated on vent support resting , arousable. Failed weaning trials.  Worsening levo requirement  family at bedside     Medications:  Reviewed    Infusion Medications    vasopressin (Septic Shock) infusion Stopped (04/29/22 0008)    dextrose      prismaSATE BGK 4/2.5 500 mL/hr at 05/04/22 0307    prismaSATE BGK 4/2.5 500 mL/hr at 05/04/22 0307    prismaSATE BGK 4/2.5 500 mL/hr at 05/04/22 0307    norepinephrine 10 mcg/min (05/04/22 0731)    sodium chloride Stopped (05/02/22 1630)     Scheduled Medications    cefTAZidime (FORTAZ) IV  1,000 mg IntraVENous 3 times per day    tobramycin (PF)  300 mg Nebulization BID    insulin glargine  30 Units SubCUTAneous Nightly    insulin glargine  30 Units SubCUTAneous QAM    sennosides-docusate sodium  2 tablet Oral BID    midodrine  10 mg Oral TID WC    insulin lispro  0-18 Units SubCUTAneous Q4H    vancomycin (VANCOCIN) intermittent dosing (placeholder)   Other RX Placeholder    chlorhexidine  15 mL Mouth/Throat BID    pantoprazole  40 mg IntraVENous Daily    ipratropium-albuterol  1 ampule Inhalation Q4H    menthol-zinc oxide   Topical Daily    apixaban  2.5 mg Oral BID    aspirin  81 mg Oral Nightly    sodium chloride flush  5-40 mL IntraVENous 2 times per day     PRN Meds: oxyCODONE-acetaminophen, carboxymethylcellulose PF **AND** artificial tears, traZODone, ipratropium-albuterol, dextrose bolus (hypoglycemia) **OR** dextrose bolus (hypoglycemia), glucagon (rDNA), dextrose, potassium chloride, magnesium sulfate, calcium gluconate **OR** calcium gluconate **OR** calcium gluconate **OR** calcium gluconate, sodium phosphate IVPB **OR** sodium phosphate IVPB **OR** sodium phosphate IVPB **OR** sodium phosphate IVPB, glucose, midazolam, fentanNYL, sodium chloride flush, sodium chloride, ondansetron **OR** ondansetron, polyethylene glycol, acetaminophen **OR** acetaminophen, perflutren lipid microspheres      Intake/Output Summary (Last 24 hours) at 5/4/2022 0747  Last data filed at 5/4/2022 0700  Gross per 24 hour   Intake 2632 ml   Output 4558 ml   Net -1926 ml       Physical Exam Performed:    /69   Pulse 73   Temp 96.7 °F (35.9 °C) (Axillary)   Resp 18   Ht 6' 2\" (1.88 m)   Wt 250 lb 9.6 oz (113.7 kg)   SpO2 97%   BMI 32.18 kg/m²       General appearance:  intubated sedated arousable   HEENT: NAD, oral ETT and OG noted   Neck: Supple, . No jugular venous distention. Trachea midline. Respiratory: Diminished breath sounds bilaterally  Cardiovascular: S 1 s2 heard, Tachycardic  Right chest HD catheter and Port noted  Abdomen: Soft, non-tender, non-distended with normal bowel sounds. Colostomy noted in left LQ  Musculoskeletal: Right BKA with stump healthy. Superficial skin ulcers with no active infection noted  Left LE edema with superficial ulceration noted   Skin: see Epic wound pictures, chronic exposed hardware in lower thoracic and lumbar region   Neurologic: intubated and sedated . , arousable     Labs:   Recent Labs     05/02/22  0258 05/03/22  0317 05/04/22  0248   WBC 33.1* 17.9* 13.0*   HGB 10.3* 10.5* 10.2*   HCT 33.1* 33.9* 31.9*    185 201     Recent Labs     05/03/22  1607 05/03/22  2100 05/04/22  0248   * 133* 134*   K 4.4 4.1 4.1   CL 96* 97* 98*   CO2 25 24 27   BUN 22* 24* 23*   CREATININE <0.5* 0.6* 0.6*   CALCIUM 8.8 8.6 8.6   PHOS 2.3* 3.1 2.6     No results for input(s): AST, ALT, BILIDIR, BILITOT, ALKPHOS in the last 72 hours. No results for input(s): INR in the last 72 hours.   No results for input(s): CKTOTAL, TROPONINI in the last 72 hours. Urinalysis:      Lab Results   Component Value Date    NITRU Negative 04/21/2022    WBCUA 21-50 04/21/2022    BACTERIA 4+ 04/21/2022    RBCUA  04/21/2022    BLOODU LARGE 04/21/2022    SPECGRAV >=1.030 04/21/2022    GLUCOSEU Negative 04/21/2022    GLUCOSEU >=1000 mg/dL 08/31/2010       Radiology:  XR CHEST PORTABLE   Final Result   Supportive tubing is in normal position. Stable left basilar opacity, atelectasis versus airspace disease. XR CHEST PORTABLE   Final Result   Slight improved aeration of the right lung. Bilateral pleuroparenchymal   disease remains         XR CHEST PORTABLE   Final Result   No significant change compared to chest x-ray from 05/01/2022. XR CHEST PORTABLE   Final Result   Low lung volumes with patchy airspace disease which may represent multifocal   atelectasis. Overall stable appearing chest.  Possible trace effusions. XR CHEST PORTABLE   Final Result   Relatively stable appearance of the chest with bibasilar airspace opacities. XR CHEST PORTABLE   Final Result   Low volume study with bilateral atelectasis or airspace disease, similar to   prior, with persistent small pleural effusions. XR CHEST PORTABLE   Final Result   Stable chest.         XR CHEST PORTABLE   Final Result   Endotracheal tube tip projects at the mid intrathoracic trachea. Otherwise   stable chest.         XR CHEST PORTABLE   Final Result   Suboptimal examination due to patient rotation. The chest appears stable. XR CHEST PORTABLE   Final Result   Endotracheal tube tip projects at the thoracic inlet. Otherwise stable chest.         XR CHEST PORTABLE   Final Result   Stable chest.         XR CHEST PORTABLE   Final Result   Stable endotracheal tube located approximately 3.3 cm above the quintin. Low lung volumes. Suspected small right pleural effusion.          XR CHEST PORTABLE   Final Result   Endotracheal tube in satisfactory position above the quintin      Bibasilar hypoaeration persist         CT ABDOMEN PELVIS W IV CONTRAST Additional Contrast? None   Final Result   1. Moderate amount of ascites with 3rd spacing including edematous changes   throughout the abdomen and anasarca. 2. Delgado in place. Diffuse bladder wall thickening. Correlate for   underlying cystitis. 3. No acute bowel pathology. Mild gastric distension. Diverticulosis with   no acute features. 4. Mild renal cortical scarring and asymmetric right renal atrophy, stable. No hydronephrosis. CT CHEST PULMONARY EMBOLISM W CONTRAST   Final Result   1. No evidence of acute pulmonary embolism. There is some thickening and   mild irregularity in the pulmonary arteries in the left lower lobe which may   represent chronic pulmonary embolism or secondary appearance to inflammation. No evidence of aortic aneurysm or dissection. 2. Moderate right effusion and right lower lobe atelectatic and/or   consolidative changes. Pneumonia is likely. There is severe loss of volume   in the right lung. Trace left effusion and left lower lobe atelectatic   changes are seen. 3. Moderate ascites around the spleen and liver. XR CHEST PORTABLE   Final Result   Low lung volumes. Bilateral pleural effusions with bibasilar volume loss,   right greater than left. No overt failure. XR CHEST PORTABLE    (Results Pending)       Blood - 4/21 - Group A , strep pyogenes and Enterococcus Faecalis   Repeat blood - NG 4/23    Urine - Ecoli , Enterococcus Faecalis     Sputum- Acinetobacter baumannii    Assessment/Plan:    Septic shock. Enterococcus faecalis and strep pyogenes bacteremia. Right lower extremity cellulitis  Chronic pressure ulcers on the back with exposed hardware      Source could be HD line vs exposed hardware  Patient was on vancomycin, Merrem and clindamycin.  changed to  fortaz , steffany nebs for acinetobacter,  Also on Vanc.  ID managing this.   Severe hypotensive shock  needing sammi, vaso, dobutamine and levophed    on hydrocortisone for shock-->weaned off   Critical care managing  Improving hypotension, now only on levo but slightly increased requirements   Wbc better     End-stage renal disease on hemodialysis. Nephrology consulted  Initiated on CRRT   Fluid removal as tolerated , CRRT running neg      Acute hypoxic resp failure  Healthcare associated pneumonia. Intubated in the ICU on 4/22. Antibiotics as above. S/p bronch 5/1/22 given worsening leucocytosis/fever  cx with acinetobacter      Chronic systolic congestive heart failure, EF 25 to 30%. Ischemic CM/CAD s/p previous stents   Cardiology consulted  Hold home medications given hypotension  Off dobutamine   Considering to resume BB ,   Resume ASA , statins  Poor overall prognosis      Acute metabolic encephalopathy resolved. Secondary to septic shock  Improving   Able to follow simple commands     Type 2 diabetes.   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 Joss Danielle     Paraplegia  Neurogenic bladder   - bed bound   - supportive care  - intermittent self catherizations , now has langley       Diet: Diet NPO  ADULT TUBE FEEDING; Orogastric; Renal Formula; Continuous; 35; Yes; 5; Q 4 hours; 40; 30; Q 4 hours; Protein; one proteinex P2Go TWICE daily via feeding tube  Code Status: Full Code    Dispo - cont care  updated family     Colin Pérez MD

## 2022-05-04 NOTE — CARE COORDINATION
INTERDISCIPLINARY PLAN OF CARE CONFERENCE    Date/Time: 5/4/2022 9:10 AM  Completed by: CONRAD Barth  Case Management      Patient Name:  Elida Byrd  YOB: 1965  Admitting Diagnosis: Hyperkalemia [E87.5]  Hypoglycemia [E16.2]  ESRD on dialysis ([facility] [address] [N18.6, Z99.2]  Acute cystitis with hematuria [N30.01]  Septic shock ([facility] [address] [A41.9, R65.21]  Sepsis ([facility] [address] [A41.9]  Pneumonia due to infectious organism, unspecified laterality, unspecified part of lung [J18.9]     Admit Date/Time:  4/21/2022  1:21 PM    Chart reviewed. Interdisciplinary team contacted or reviewed plan related to patient progress and discharge plans. Disciplines included Case Management, Nursing, and Dietitian. Current Status:Ongoing  PT/OT recommendation for discharge plan of care: n/a    Expected D/C Disposition:  Home    Discharge Plan Comments: Chart review completed. Completed Interdisciplinary rounds with ICU staff. Pt remains in the ICU on a Vent; awake on the vent. Dr. Jane Celis talked about possible Trach with pt's daughter and mother at bedside. Pt likely will need LTACH. RN will discuss possible trach with pt's wife and notify writer when she does as writer will call to discuss LTACH options. CM will continue to follow and assist. Please notify CM if needs or concerns arise.     Home O2 in place on admit: Yes    Addendum at 1:07pm: Spoke with RN who states family is discussing the [facility]ol[facility] Stager and to follow up with family in a few days about LTACH/post discharge plans

## 2022-05-04 NOTE — CONSULTS
Pt seen for ostomy appliance change. Old appliance removed, in place 4/28 and intact with good seal on back of old wafer. Skin cleansed and patted dry. Stoma pink moist and budded with intact peristomal skin.  germaine ring placed around stoma and then pt repouched using 2 piece 2 1/4\" flat wafer and drainable pouch with good seal.  Family at bedside. Pt awake and nods head to yes no questions. Remains on Vent.
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
## 2022-05-04 NOTE — PROGRESS NOTES
EOS bedside report given to LIONEL Mosley. Changes overnight:  · Pt's mother, Jose Holder, updated. · CHG bath, linen change, and wound care completed. · Continues to need frequent PRN pain medication, both fentanyl and percocet. · Levophed ranged from 3 to 8 mcg/min, currently at 5 mcg/min. · WBC continues to trend down, from 17.9 to 13.0 this AM.  · BS 49 on 0300 labs, 125 ml of D10% administered per orders. BS on recheck 63. D10% administered again, third BS was 87. Then, BS 60 at 0700, D10% administered again around shift change, day shift to recheck BS. · No electrolyte replacements needed this shift. · Pt had a 18 beat run of v-tach around 0530. Strip printed and placed in chart. Pt asymptomatic and calm during ectopy, but had just complained of back pain prior. · Pt had no episodes of desaturations until around 0600. Now, SpO2 going up when suctioned and given 100%, but goes back down the the high 70s rather quickly. FiO2 increased to 50% by day shift RN. No other changes noted. Pt stable at this time, care transferred.

## 2022-05-04 NOTE — PROGRESS NOTES
Peep incraesed to [facility]         05/04/22 0733   NICU Vent Information   Vt (Set, mL) 490 mL   Vt Exhaled 492 mL   Resp Rate (Set) 18 bmp   Rate Measured 19 br/min   Minute Volume 9.1 Liters   Peak Flow 50 L/min   Pressure Support 0 cmH20   FiO2  60 %   Peak Inspiratory Pressure 34 cmH2O   I:E Ratio 1:2.10   Sensitivity 3   High Peep/I Pressure 0   PEEP/CPAP (cmH2O) 8   I Time/ I Time % 0 s   Mean Airway Pressure 16 cmH20   Plateau Pressure 29 [id number]   Static Compliance 46 mL/cmH2O   Breath Sounds   Right Upper Lobe Rhonchi   Right Middle Lobe Clear;Diminished   Right Lower Lobe Clear;Diminished   Left Upper Lobe Diminished   Left Lower Lobe Diminished   Additional Respiratory Assessments   Pulse 73   Resp 18   SpO2 97 %   Position Reverse Trendelenburg   Humidification Source Heated wire   Humidification Temp 37   Cuff Pressure (cm H2O) 27 cm H2O   Vent Alarm Settings   High Pressure  45 cmH2O   Low Minute Volume Alarm 4 L/min   RR High 40 br/min   ETT (adult)   Placement Date/Time: 04/22/22 0945   Present on Admission/Arrival: No  Placed By: Licensed provider  Placement Verified By: Chest X-ray  Preoxygenation: Yes  Mask Ventilation: Mask ventilation not attempted (0)  Technique: Rapid sequence;Direct laryng. ..    Secured At 26 cm   Measured From Lips   ETT Placement Center

## 2022-05-04 NOTE — PROGRESS NOTES
RT Nebulizer Bronchodilator Protocol Note    There is a bronchodilator order in the chart from a provider indicating to follow the RT Bronchodilator Protocol and there is an Initiate RT Bronchodilator Protocol order as well (see protocol at bottom of note). CXR Findings:  XR CHEST PORTABLE    Result Date: 5/4/2022  Supportive tubing is in normal position. Stable left basilar opacity, atelectasis versus airspace disease. XR CHEST PORTABLE    Result Date: 5/3/2022  Slight improved aeration of the right lung. Bilateral pleuroparenchymal disease remains       The findings from the last RT Protocol Assessment were as follows:  Smoking: (P) Chronic pulmonary disease  Respiratory Pattern: (P) Dyspnea on exertion or RR 21-25 bpm  Breath Sounds: (P) Slightly diminished and/or crackles  Cough: (P) Weak, productive  Indication for Bronchodilator Therapy: (P) Decreased or absent breath sounds  Bronchodilator Assessment Score: (P) 8    Aerosolized bronchodilator medication orders have been revised according to the RT Nebulizer Bronchodilator Protocol below. Respiratory Therapist to perform RT Therapy Protocol Assessment initially then follow the protocol. Repeat RT Therapy Protocol Assessment PRN for score 0-3 or on second treatment, BID, and PRN for scores above 3. No Indications - adjust the frequency to every 6 hours PRN wheezing or bronchospasm, if no treatments needed after 48 hours then discontinue using Per Protocol order mode. If indication present, adjust the RT bronchodilator orders based on the Bronchodilator Assessment Score as indicated below. If a patient is on this medication at home then do not decrease Frequency below that used at home. 0-3 - enter or revise RT bronchodilator order(s) to equivalent RT Bronchodilator order with Frequency of every 4 hours PRN for wheezing or increased work of breathing using Per Protocol order mode.        4-6 - enter or revise RT Bronchodilator order(s) to two equivalent RT bronchodilator orders with one order with BID Frequency and one order with Frequency of every 4 hours PRN wheezing or increased work of breathing using Per Protocol order mode. 7-10 - enter or revise RT Bronchodilator order(s) to two equivalent RT bronchodilator orders with one order with TID Frequency and one order with Frequency of every 4 hours PRN wheezing or increased work of breathing using Per Protocol order mode. 11-13 - enter or revise RT Bronchodilator order(s) to one equivalent RT bronchodilator order with QID Frequency and an Albuterol order with Frequency of every 4 hours PRN wheezing or increased work of breathing using Per Protocol order mode. Greater than 13 - enter or revise RT Bronchodilator order(s) to one equivalent RT bronchodilator order with every 4 hours Frequency and an Albuterol order with Frequency of every 2 hours PRN wheezing or increased work of breathing using Per Protocol order mode. RT to enter RT Home Evaluation for COPD & MDI Assessment order using Per Protocol order mode.     Electronically signed by Michael Mercedes RCP on 5/4/2022 at 12:43 PM

## 2022-05-04 NOTE — PROGRESS NOTES
This note also relates to the following rows which could not be included:  Resp - Cannot attach notes to unvalidated device data  SpO2 - Cannot attach notes to unvalidated device data  FiO2  - Cannot attach notes to unvalidated device data       05/04/22 0317   Patient Observation   Pulse 69   Breath Sounds   Right Upper Lobe Rhonchi   Right Middle Lobe Diminished   Right Lower Lobe Diminished   Left Upper Lobe Rhonchi   Left Lower Lobe Diminished   Airway Clearance   Suction ET Tube   Suction Device Inline suction catheter   Sputum Method Obtained Endotracheal   Sputum Color/Odor White   Sputum Consistency Thick   Vent Information   Vent Mode AC/VC   Vent Settings   Resp Rate (Set) 18 bmp   Vt (Set, mL) 490 mL   PEEP/CPAP (cmH2O) 5   Trigger Sensitivity Flow (L/min) 3 L/min   Vent Patient Data (Readings)   Vt Exhaled 517 mL   Rate Measured 21 br/min   Minute Volume 10.2 Liters   Peak Flow 50 L/min   Pressure Support 0 cmH20   I:E Ratio 1:2.10   Sensitivity 3   Peak Inspiratory Pressure 27 cmH2O   Mean Airway Pressure 13 cmH20   High Peep/I Pressure 0   I Time/ I Time % 0 s   Plateau Pressure (cm H2O) 24 cm H2O   Vent Alarm Settings   High Pressure  45 cmH2O   Low Minute Volume Alarm 4 L/min   RR High 40 br/min   ETT (adult)   Placement Date/Time: 04/22/22 0945   Present on Admission/Arrival: No  Placed By: Licensed provider  Placement Verified By: Chest X-ray  Preoxygenation: Yes  Mask Ventilation: Mask ventilation not attempted (0)  Technique: Rapid sequence;Direct laryng. ..    ETT Placement Center

## 2022-05-04 NOTE — PROGRESS NOTES
05/03/22 2320   Patient Observation   Pulse 74   Resp 21   SpO2 96 %   Breath Sounds   Right Upper Lobe Rhonchi   Right Middle Lobe Rhonchi   Right Lower Lobe Diminished   Left Upper Lobe Rhonchi   Left Lower Lobe Diminished   Airway Clearance   Suction ET Tube   Suction Device Inline suction catheter   Sputum Amount Moderate   Sputum Color/Odor White;Clear   Sputum Consistency Thick   Vent Information   Vent Mode AC/VC   Vent Settings   FiO2  40 %   Resp Rate (Set) 18 bmp   Vt (Set, mL) 490 mL   PEEP/CPAP (cmH2O) 5   Trigger Sensitivity Flow (L/min) 3 L/min   Vent Patient Data (Readings)   Vt Exhaled 556 mL   Rate Measured 21 br/min   Minute Volume 10.6 Liters   Peak Flow 50 L/min   Pressure Support 0 cmH20   I:E Ratio 1:2.10   Sensitivity 3   Peak Inspiratory Pressure 27 cmH2O   Mean Airway Pressure 13 cmH20   High Peep/I Pressure 0   I Time/ I Time % 0 s   Plateau Pressure (cm H2O) 24 cm H2O   Vent Alarm Settings   High Pressure  45 cmH2O   Low Minute Volume Alarm 4 L/min   RR High 40 br/min   ETT (adult)   Placement Date/Time: 04/22/22 0945   Present on Admission/Arrival: No  Placed By: Licensed provider  Placement Verified By: Chest X-ray  Preoxygenation: Yes  Mask Ventilation: Mask ventilation not attempted (0)  Technique: Rapid sequence;Direct laryng. ..    ETT Placement Left

## 2022-05-04 NOTE — PROGRESS NOTES
Dr. Sultana Gates at bedside     -keep pt even, once able to decrease levophed resume fluid removal at -50 cc hourly    Claudia Valencia RN, BSN

## 2022-05-04 NOTE — PROGRESS NOTES
05/04/22 1901   Patient Observation   Pulse 71   SpO2 97 %   Airway Clearance   Sputum Method Obtained Endotracheal   Sputum Amount Small   Sputum Color/Odor White   Sputum Consistency Thick   Vent Information   Vent Mode AC/VC   Vent Settings   FiO2  45 %   Resp Rate (Set) 18 bmp   Vt (Set, mL) 490 mL   PEEP/CPAP (cmH2O) 8   Vent Patient Data (Readings)   Vt Exhaled 486 mL   Rate Measured 18 br/min   Minute Volume 8.75 Liters   Peak Flow 50 L/min   I:E Ratio 1:2.10   Peak Inspiratory Pressure 30 cmH2O   Mean Airway Pressure 15 cmH20

## 2022-05-04 NOTE — PROGRESS NOTES
05/03/22 2000   Patient Observation   Pulse 72   Resp 17   SpO2 98 %   Breath Sounds   Right Upper Lobe Rhonchi   Right Middle Lobe Diminished   Right Lower Lobe Diminished   Left Upper Lobe Rhonchi   Left Lower Lobe Diminished   Airway Clearance   Suction ET Tube   Subglottic Suction Done Yes   Suction Device Inline suction catheter;Roberto   Sputum Method Obtained Endotracheal   Sputum Amount Small   Sputum Color/Odor Clear; White   Sputum Consistency Thick   Vent Information   Vent Mode AC/VC   Vent Settings   FiO2  40 %   Resp Rate (Set) 18 bmp   Vt (Set, mL) 490 mL   PEEP/CPAP (cmH2O) 5   Trigger Sensitivity Flow (L/min) 3 L/min   Vent Patient Data (Readings)   Vt Exhaled 316 mL   Rate Measured 23 br/min   Minute Volume 8.38 Liters   Peak Flow 50 L/min   Pressure Support 0 cmH20   I:E Ratio 1.20:1   Sensitivity 3   Peak Inspiratory Pressure 28 cmH2O   Mean Airway Pressure 14 cmH20   High Peep/I Pressure 0   I Time/ I Time % 0 s   Plateau Pressure (cm H2O) 24 cm H2O   Static Compliance (L/cm H2O) 17   Dynamic Compliance (L/cm H2O) 35 L/cm H2O   Vent Alarm Settings   High Pressure  40 cmH2O   Low Minute Volume Alarm 4 L/min   RR High 40 br/min   ETT (adult)   Placement Date/Time: 04/22/22 0945   Present on Admission/Arrival: No  Placed By: Licensed provider  Placement Verified By: Chest X-ray  Preoxygenation: Yes  Mask Ventilation: Mask ventilation not attempted (0)  Technique: Rapid sequence;Direct laryng. ..    Secured At 26 cm   Measured From Lips   ETT Placement Center   Secured By Commercial tube simon   Site Assessment Dry

## 2022-05-04 NOTE — PROGRESS NOTES
Dr. Manuel Sevilla at bedside    -discussed trach/peg placement  -decrease lantus  -resume CRRT per nephrology guidelines     Kimber Granados RN, BSN

## 2022-05-04 NOTE — PROGRESS NOTES
RT Inhaler-Nebulizer Bronchodilator Protocol Note    There is a bronchodilator order in the chart from a provider indicating to follow the RT Bronchodilator Protocol and there is an Initiate RT Inhaler-Nebulizer Bronchodilator Protocol order as well (see protocol at bottom of note). CXR Findings:  XR CHEST PORTABLE    Result Date: 5/3/2022  Slight improved aeration of the right lung. Bilateral pleuroparenchymal disease remains     XR CHEST PORTABLE    Result Date: 5/2/2022  No significant change compared to chest x-ray from 05/01/2022. The findings from the last RT Protocol Assessment were as follows:   History Pulmonary Disease: Chronic pulmonary disease  Respiratory Pattern: Dyspnea on exertion or RR 21-25 bpm  Breath Sounds: Slightly diminished and/or crackles  Cough: Weak, productive  Indication for Bronchodilator Therapy: Decreased or absent breath sounds  Bronchodilator Assessment Score: 8    Aerosolized bronchodilator medication orders have been revised according to the RT Inhaler-Nebulizer Bronchodilator Protocol below. Respiratory Therapist to perform RT Therapy Protocol Assessment initially then follow the protocol. Repeat RT Therapy Protocol Assessment PRN for score 0-3 or on second treatment, BID, and PRN for scores above 3. No Indications - adjust the frequency to every 6 hours PRN wheezing or bronchospasm, if no treatments needed after 48 hours then discontinue using Per Protocol order mode. If indication present, adjust the RT bronchodilator orders based on the Bronchodilator Assessment Score as indicated below. Use Inhaler orders unless patient has one or more of the following: on home nebulizer, not able to hold breath for 10 seconds, is not alert and oriented, cannot activate and use MDI correctly, or respiratory rate 25 breaths per minute or more, then use the equivalent nebulizer order(s) with same Frequency and PRN reasons based on the score.   If a patient is on this medication at home then do not decrease Frequency below that used at home. 0-3 - enter or revise RT bronchodilator order(s) to equivalent RT Bronchodilator order with Frequency of every 4 hours PRN for wheezing or increased work of breathing using Per Protocol order mode. 4-6 - enter or revise RT Bronchodilator order(s) to two equivalent RT bronchodilator orders with one order with BID Frequency and one order with Frequency of every 4 hours PRN wheezing or increased work of breathing using Per Protocol order mode. 7-10 - enter or revise RT Bronchodilator order(s) to two equivalent RT bronchodilator orders with one order with TID Frequency and one order with Frequency of every 4 hours PRN wheezing or increased work of breathing using Per Protocol order mode. 11-13 - enter or revise RT Bronchodilator order(s) to one equivalent RT bronchodilator order with QID Frequency and an Albuterol order with Frequency of every 4 hours PRN wheezing or increased work of breathing using Per Protocol order mode. Greater than 13 - enter or revise RT Bronchodilator order(s) to one equivalent RT bronchodilator order with every 4 hours Frequency and an Albuterol order with Frequency of every 2 hours PRN wheezing or increased work of breathing using Per Protocol order mode.        Electronically signed by Tra Dong RCP on 5/4/2022 at 12:21 AM

## 2022-05-04 NOTE — PROGRESS NOTES
Pharmacy Vancomycin Consult     Vancomycin Day: 12  Current Dosing: Pulse  Current indication: bloodstream infection    Recent Labs     05/03/22  0317 05/03/22  0837 05/03/22  2100 05/04/22  0248   BUN 26*   < > 24* 23*   CREATININE 0.6*   < > 0.6* 0.6*   WBC 17.9*  --   --  13.0*    < > = values in this interval not displayed. Estimated Creatinine Clearance: 189 mL/min (A) (based on SCr of 0.6 mg/dL (L)). Trough: 17.47    Assessment/Plan:  Will dose 750 mg x1 and will recheck a level with AM labs tomorrow.      Cam AraujoD, [facility], 5/4/2022 4:00 AM

## 2022-05-05 NOTE — PROGRESS NOTES
Bedside report given to [facility] EMERGENCY [facility]. POC transferred.  Fred Rocha

## 2022-05-05 NOTE — PROGRESS NOTES
Shift assessment, completed, see flow sheet. Pt alert and oriented, nodding appropriately, following commands.      Intubated and sedated with a # 7.5 ETT, at 26 LL.   On AC 18 / 490 /45 %/ 5.  Temp 98.5 via bladder, SR 88, BP 102/59 (70), SpO2 92%. Respirations are easy, even, and unlabored. Bilateral lung sounds rhonchi/diminished. OG in place at 64, with TF 40 ml/hr (goal).       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 Covered with xeroform and ABD pads     R PAC, WNL with levophed 5 mcg/min infusing.      1 PIVs, WNL, KVO infusing.     R tunneled vas cath with CRRT running,  ml/min, Pre 500 ml/hr, Post 500 ml/hr, dialysate 500 ml/hr. Removing 50 ml/hr. Blood warmer in place on return line and turned on.     Delgado in place and patent with britney output.  LUQ colostomy with watery output.      Pt on dolphin immersion mattress.

## 2022-05-05 NOTE — PROGRESS NOTES
Reassessment completed, see flowsheets, unchanged from prior. VSS. Continues on pressure support.      Levophed 3 mcg/min infusing.     CRRT running well, removing 50 ml/hr, pt continues to tolerate.     All ICU Lines and monitoring remain in place. Bed locked in lowest position.  Call light within reach.

## 2022-05-05 NOTE — PROGRESS NOTES
RT Inhaler-Nebulizer Bronchodilator Protocol Note    There is a bronchodilator order in the chart from a provider indicating to follow the RT Bronchodilator Protocol and there is an Initiate RT Inhaler-Nebulizer Bronchodilator Protocol order as well (see protocol at bottom of note). CXR Findings:  XR CHEST PORTABLE    Result Date: 5/5/2022  Low lung volumes with bibasilar atelectasis. Otherwise no acute process. XR CHEST PORTABLE    Result Date: 5/4/2022  Supportive tubing is in normal position. Stable left basilar opacity, atelectasis versus airspace disease. The findings from the last RT Protocol Assessment were as follows:   History Pulmonary Disease: (P) Chronic pulmonary disease  Respiratory Pattern: (P) Dyspnea on exertion or RR 21-25 bpm  Breath Sounds: (P) Inspiratory and expiratory or bilateral wheezing and/or rhonchi  Cough: (P) Weak, productive  Indication for Bronchodilator Therapy: (P) Decreased or absent breath sounds  Bronchodilator Assessment Score: (P) 12    Aerosolized bronchodilator medication orders have been revised according to the RT Inhaler-Nebulizer Bronchodilator Protocol below. Respiratory Therapist to perform RT Therapy Protocol Assessment initially then follow the protocol. Repeat RT Therapy Protocol Assessment PRN for score 0-3 or on second treatment, BID, and PRN for scores above 3. No Indications - adjust the frequency to every 6 hours PRN wheezing or bronchospasm, if no treatments needed after 48 hours then discontinue using Per Protocol order mode. If indication present, adjust the RT bronchodilator orders based on the Bronchodilator Assessment Score as indicated below.   Use Inhaler orders unless patient has one or more of the following: on home nebulizer, not able to hold breath for 10 seconds, is not alert and oriented, cannot activate and use MDI correctly, or respiratory rate 25 breaths per minute or more, then use the equivalent nebulizer order(s) with same Frequency and PRN reasons based on the score. If a patient is on this medication at home then do not decrease Frequency below that used at home. 0-3 - enter or revise RT bronchodilator order(s) to equivalent RT Bronchodilator order with Frequency of every 4 hours PRN for wheezing or increased work of breathing using Per Protocol order mode. 4-6 - enter or revise RT Bronchodilator order(s) to two equivalent RT bronchodilator orders with one order with BID Frequency and one order with Frequency of every 4 hours PRN wheezing or increased work of breathing using Per Protocol order mode. 7-10 - enter or revise RT Bronchodilator order(s) to two equivalent RT bronchodilator orders with one order with TID Frequency and one order with Frequency of every 4 hours PRN wheezing or increased work of breathing using Per Protocol order mode. 11-13 - enter or revise RT Bronchodilator order(s) to one equivalent RT bronchodilator order with QID Frequency and an Albuterol order with Frequency of every 4 hours PRN wheezing or increased work of breathing using Per Protocol order mode. Greater than 13 - enter or revise RT Bronchodilator order(s) to one equivalent RT bronchodilator order with every 4 hours Frequency and an Albuterol order with Frequency of every 2 hours PRN wheezing or increased work of breathing using Per Protocol order mode. RT to enter RT Home Evaluation for COPD & MDI Assessment order using Per Protocol order mode.     Electronically signed by Smith Rose RCP on 5/5/2022 at 5:00 PM

## 2022-05-05 NOTE — PROGRESS NOTES
05/05/22 1837   Patient Observation   Pulse 81   Resp 18   SpO2 94 %   Observations ETT SIZE 7.5, SECURED AT 25 LIP LINE. CENTER. AMBU BAG AT HEAD OF BED. WATER GOOD.     Breath Sounds   Right Upper Lobe Rhonchi   Right Middle Lobe Diminished   Right Lower Lobe Diminished   Left Upper Lobe Rhonchi   Left Lower Lobe Diminished   Airway Clearance   Suction ET Tube   Suction Device Suction catheter   Sputum Method Obtained Endotracheal   Sputum Amount Small   Sputum Color/Odor   (creamy)   Sputum Consistency Thick   Vent Information   Vent Mode AC/VC   Vent Settings   FiO2  45 %   Resp Rate (Set) 18 bmp   Vt (Set, mL) 490 mL   PEEP/CPAP (cmH2O) 8   Vent Patient Data (Readings)   Vt Exhaled 495 mL   Rate Measured 18 br/min   Minute Volume 8.85 Liters   Peak Flow 50 L/min   Pressure Support 0 cmH20   I:E Ratio 1:2.10   Sensitivity 3   Peak Inspiratory Pressure 30 cmH2O   Mean Airway Pressure 15 cmH20   High Peep/I Pressure 0   I Time/ I Time % 0 s   Plateau Pressure (cm H2O) 27 cm H2O   Static Compliance (L/cm H2O) 50   Dynamic Compliance (L/cm H2O) 23 L/cm H2O   Vent Alarm Settings   High Pressure  45 cmH2O   Low Minute Volume Alarm 4 L/min   RR High 40 br/min   Additional Respiratory Assessments   Position Semi-Mejía's   Humidification Source Heated wire   Humidification Temp 37   Circuit Condensation Drained

## 2022-05-05 NOTE — PROGRESS NOTES
Blood pressure 117/66, pulse 84, temperature 98.6 °F (37 °C), temperature source Bladder, resp. rate 19, height 6' 2\" (1.88 m), weight 250 lb 9.6 oz (113.7 kg), SpO2 92 %. Patient currently intubated with ETT at 22. Vent settings are 18/490/+8/45%. LEVOPHED infusing at a rate of 5 mcg/min. CRRT running without difficulty at this time through his R IJ tunneled line. Blood flow = 200 ml/min  Post filer, dialysate, pre filter = 500ml/hr   -50 ordered hourly removal     No replacements needed from 0300 lab draw. , no SS insulin coverage given. Reassessment completed. Pt given complete CHG bath and dressings changed to R leg, buttocks, and spine. RLE dressings to be changed tomorrow as they are every other day. Dressing changed to tunneled vas cath. Sheets changed and repositioned in bed. No further changes noted in assessment. Pt resting w eyes closed at this time.  Electronically signed by José Miguel Reyes RN on 5/5/2022 at 4:38 AM

## 2022-05-05 NOTE — PROGRESS NOTES
Dr. Yenni Jerry at the bedside to examine pt. See progress note for details.      Last dose of vancomycin today
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
## 2022-05-05 NOTE — PLAN OF CARE
Problem: Discharge Planning  Goal: Discharge to home or other facility with appropriate resources  Outcome: Progressing     Problem: Chronic Conditions and Co-morbidities  Goal: Patient's chronic conditions and co-morbidity symptoms are monitored and maintained or improved  Outcome: Progressing     Problem: Safety - Medical Restraint  Goal: Remains free of injury from restraints (Restraint for Interference with Medical Device)  Description: INTERVENTIONS:  1. Determine that other, less restrictive measures have been tried or would not be effective before applying the restraint  2. Evaluate the patient's condition at the time of restraint application  3. Inform patient/family regarding the reason for restraint  4. Q2H: Monitor safety, psychosocial status, comfort, nutrition and hydration  Outcome: Progressing     Problem: Nutrition Deficit:  Goal: Optimize nutritional status  Outcome: Progressing     Problem: Pain  Goal: Verbalizes/displays adequate comfort level or baseline comfort level  Outcome: Progressing     Problem: Skin/Tissue Integrity  Goal: Absence of new skin breakdown  Description: 1. Monitor for areas of redness and/or skin breakdown  2. Assess vascular access sites hourly  3. Every 4-6 hours minimum:  Change oxygen saturation probe site  4. Every 4-6 hours:  If on nasal continuous positive airway pressure, respiratory therapy assess nares and determine need for appliance change or resting period.   Outcome: Progressing     Problem: Safety - Adult  Goal: Free from fall injury  Outcome: Progressing  Flowsheets (Taken 5/5/2022 1945)  Free From Fall Injury: Instruct family/caregiver on patient safety     Problem: ABCDS Injury Assessment  Goal: Absence of physical injury  Outcome: Progressing

## 2022-05-05 NOTE — PROGRESS NOTES
5/5  Vanc random = 16.2 mcg/mL at 0605. Give vancomycin 1000 mg x1.   Recheck vanc random tomorrow in the AM.  Bong Brantley, PharmD  5/5/2022 7:28 AM

## 2022-05-05 NOTE — PROGRESS NOTES
Pt BP dropped from 108/62 to 52/45 over a 15 minute span. Levo increased from 4 to 6 and fluid removal turned to 0 on CRRT. Pt remains alert and following commands despite low BP. Recovered up to 100/89 in 10 minutes. Will continue to monitor.  Electronically signed by Destinee Lofton RN on 5/4/2022 at 9:57 PM

## 2022-05-05 NOTE — PROGRESS NOTES
Care rounds completed with Dr. Lalo Locke and multidisciplinary team. Reviewed labs, meds, VS, assessment, & plan of care for today. See dictated note and new orders for details. ABG 2 hours. Leave on pressure support for as long as pt tolerates, plan to extubate after couple days of prolonged SBTs.

## 2022-05-05 NOTE — PROGRESS NOTES
Reassessment completed, see flowsheets, unchanged from prior. VSS. Pt resting on AC. C/o Pain in left arm/shoulder. Gave PRN percocet per order.      Levophed 2 mcg/min infusing.     CRRT running well, removing 50 ml/hr, pt continues to tolerate.     All ICU Lines and monitoring remain in place. Bed locked in lowest position.  Call light within reach.
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
## 2022-05-05 NOTE — PROGRESS NOTES
Blood pressure 123/61, pulse 75, temperature 98.3 °F (36.8 °C), temperature source Bladder, resp. rate 18, height 6' 2\" (1.88 m), weight 250 lb 9.6 oz (113.7 kg), SpO2 96 %. Patient currently intubated with ETT at 22. Vent settings are 18/490/+8/45%. LEVOPHED infusing at a rate of 5 mcg/min. CRRT running without difficulty at this time through his R IJ tunneled line. Blood flow = 200 ml/min  Post filer, dialysate, pre filter = 500ml/hr   -50 ordered hourly removal     Labs due at 2100- will assess for replacement need. BS 87, SS held along with lantus at this time due to previous episodes of hypoglycemia. Shift assessment completed. Pt responds to verbal stimulation and follows commands. Resting comfortably in bed at this time. Remains vent compliant. Rhonchi in the upper lobes, otherwise just diminished in the lower lobes. Little to no secretions. SR w PVC. BP stable on levo. Abd slightly distended but nontender. OG w TF at goal- 150cc of residuals noted. LLQ colostomy w no OP at this time. Delgado patent, drops of urine in the tube, none to measure. Dressings to wounds clean and dry. Repositioned. No further needs noted at this time.  Electronically signed by Romario Ramirez RN on 5/4/2022 at 8:37 PM

## 2022-05-05 NOTE — PROGRESS NOTES
Hospitalist Progress Note      PCP: Khloe Pacheco MD    Date of Admission: 4/21/2022    Subjective:     49 y.o. male with hx of ischemic CM, paraplegia, IDDM, ESRD on HD presents with bacteremia and decubitus ulcers, septic shock     Improving sepsis from last week, off sammi, vaso, dobutamine, now remains on levophed,      Ongoing CRRT with fluid removal   No fevers    Pt seen sedated on vent support resting , arousable. ongoing weaning trials.  Remains on   5mcg  levo   family at bedside     Medications:  Reviewed    Infusion Medications    vasopressin (Septic Shock) infusion Stopped (04/29/22 0008)    dextrose      prismaSATE BGK 4/2.5 500 mL/hr at 05/04/22 2306    prismaSATE BGK 4/2.5 500 mL/hr at 05/04/22 2304    prismaSATE BGK 4/2.5 500 mL/hr at 05/04/22 2308    norepinephrine 5 mcg/min (05/05/22 0103)    sodium chloride Stopped (05/02/22 1630)     Scheduled Medications    vancomycin  1,000 mg IntraVENous Once    insulin glargine  25 Units SubCUTAneous Nightly    cefTAZidime (FORTAZ) IV  1,000 mg IntraVENous 3 times per day    tobramycin (PF)  300 mg Nebulization BID    sennosides-docusate sodium  2 tablet Oral BID    midodrine  10 mg Oral TID WC    insulin lispro  0-18 Units SubCUTAneous Q4H    vancomycin (VANCOCIN) intermittent dosing (placeholder)   Other RX Placeholder    chlorhexidine  15 mL Mouth/Throat BID    pantoprazole  40 mg IntraVENous Daily    ipratropium-albuterol  1 ampule Inhalation Q4H    menthol-zinc oxide   Topical Daily    apixaban  2.5 mg Oral BID    aspirin  81 mg Oral Nightly    sodium chloride flush  5-40 mL IntraVENous 2 times per day     PRN Meds: oxyCODONE-acetaminophen, carboxymethylcellulose PF **AND** artificial tears, traZODone, ipratropium-albuterol, dextrose bolus (hypoglycemia) **OR** dextrose bolus (hypoglycemia), glucagon (rDNA), dextrose, potassium chloride, magnesium sulfate, calcium gluconate **OR** calcium gluconate **OR** calcium gluconate **OR** calcium gluconate, sodium phosphate IVPB **OR** sodium phosphate IVPB **OR** sodium phosphate IVPB **OR** sodium phosphate IVPB, glucose, midazolam, fentanNYL, sodium chloride flush, sodium chloride, ondansetron **OR** ondansetron, polyethylene glycol, acetaminophen **OR** acetaminophen, perflutren lipid microspheres      Intake/Output Summary (Last 24 hours) at 5/5/2022 0804  Last data filed at 5/5/2022 0700  Gross per 24 hour   Intake 2061.24 ml   Output 2838 ml   Net -776.76 ml       Physical Exam Performed:    BP (!) 95/58   Pulse 85   Temp 98.5 °F (36.9 °C) (Bladder)   Resp 18   Ht 6' 2\" (1.88 m)   Wt 250 lb 9.6 oz (113.7 kg)   SpO2 93%   BMI 32.18 kg/m²       General appearance:  intubated sedated arousable alert oriented  HEENT: NAD, oral ETT and OG noted   Neck: Supple, . No jugular venous distention. Trachea midline. Respiratory: Diminished breath sounds bilaterally  Cardiovascular: S 1 s2 heard, Tachycardic  Right chest HD catheter and Port noted  Abdomen: Soft, non-tender, +distended with normal bowel sounds. Colostomy noted in left LQ  Musculoskeletal: Right BKA with stump healthy. Superficial skin ulcers on right thigh with no active infection noted  Left LE edema with superficial ulceration noted   Skin: see Epic wound pictures, chronic exposed hardware in lower thoracic and lumbar region   Neurologic: intubated and sedated . , arousable awake, alert oriented    Labs:   Recent Labs     05/03/22  0317 05/04/22  0248 05/05/22  0605   WBC 17.9* 13.0* 10.8   HGB 10.5* 10.2* 9.5*   HCT 33.9* 31.9* 29.4*    201 145     Recent Labs     05/04/22  1534 05/04/22  2107 05/05/22  0255   * 131* 132*   K 4.3 4.2 4.2   CL 96* 96* 96*   CO2 26 26 25   BUN 19 20 20   CREATININE <0.5* <0.5* <0.5*   CALCIUM 8.5 8.6 8.6   PHOS 2.1* 2.6 2.5     No results for input(s): AST, ALT, BILIDIR, BILITOT, ALKPHOS in the last 72 hours. No results for input(s): INR in the last 72 hours.   No results for input(s): Ban Beat in the last 72 hours. Urinalysis:      Lab Results   Component Value Date    NITRU Negative 04/21/2022    WBCUA 21-50 04/21/2022    BACTERIA 4+ 04/21/2022    RBCUA  04/21/2022    BLOODU LARGE 04/21/2022    SPECGRAV >=1.030 04/21/2022    GLUCOSEU Negative 04/21/2022    GLUCOSEU >=1000 mg/dL 08/31/2010       Radiology:  XR CHEST PORTABLE   Final Result   Low lung volumes with bibasilar atelectasis. Otherwise no acute process. XR CHEST PORTABLE   Final Result   Supportive tubing is in normal position. Stable left basilar opacity, atelectasis versus airspace disease. XR CHEST PORTABLE   Final Result   Slight improved aeration of the right lung. Bilateral pleuroparenchymal   disease remains         XR CHEST PORTABLE   Final Result   No significant change compared to chest x-ray from 05/01/2022. XR CHEST PORTABLE   Final Result   Low lung volumes with patchy airspace disease which may represent multifocal   atelectasis. Overall stable appearing chest.  Possible trace effusions. XR CHEST PORTABLE   Final Result   Relatively stable appearance of the chest with bibasilar airspace opacities. XR CHEST PORTABLE   Final Result   Low volume study with bilateral atelectasis or airspace disease, similar to   prior, with persistent small pleural effusions. XR CHEST PORTABLE   Final Result   Stable chest.         XR CHEST PORTABLE   Final Result   Endotracheal tube tip projects at the mid intrathoracic trachea. Otherwise   stable chest.         XR CHEST PORTABLE   Final Result   Suboptimal examination due to patient rotation. The chest appears stable. XR CHEST PORTABLE   Final Result   Endotracheal tube tip projects at the thoracic inlet.   Otherwise stable chest.         XR CHEST PORTABLE   Final Result   Stable chest.         XR CHEST PORTABLE   Final Result   Stable endotracheal tube located approximately 3.3 cm above the quintin. Low lung volumes. Suspected small right pleural effusion. XR CHEST PORTABLE   Final Result   Endotracheal tube in satisfactory position above the quintin      Bibasilar hypoaeration persist         CT ABDOMEN PELVIS W IV CONTRAST Additional Contrast? None   Final Result   1. Moderate amount of ascites with 3rd spacing including edematous changes   throughout the abdomen and anasarca. 2. Delgado in place. Diffuse bladder wall thickening. Correlate for   underlying cystitis. 3. No acute bowel pathology. Mild gastric distension. Diverticulosis with   no acute features. 4. Mild renal cortical scarring and asymmetric right renal atrophy, stable. No hydronephrosis. CT CHEST PULMONARY EMBOLISM W CONTRAST   Final Result   1. No evidence of acute pulmonary embolism. There is some thickening and   mild irregularity in the pulmonary arteries in the left lower lobe which may   represent chronic pulmonary embolism or secondary appearance to inflammation. No evidence of aortic aneurysm or dissection. 2. Moderate right effusion and right lower lobe atelectatic and/or   consolidative changes. Pneumonia is likely. There is severe loss of volume   in the right lung. Trace left effusion and left lower lobe atelectatic   changes are seen. 3. Moderate ascites around the spleen and liver. XR CHEST PORTABLE   Final Result   Low lung volumes. Bilateral pleural effusions with bibasilar volume loss,   right greater than left. No overt failure. XR CHEST PORTABLE    (Results Pending)       Blood - 4/21 - Group A , strep pyogenes and Enterococcus Faecalis   Repeat blood - NG 4/23    Urine - Ecoli , Enterococcus Faecalis     Sputum- Acinetobacter baumannii    Assessment/Plan:    Septic shock. Enterococcus faecalis and strep pyogenes bacteremia.   Right lower extremity cellulitis  Chronic pressure ulcers on the back with exposed hardware      Source could be HD line vs exposed hardware  Patient was on vancomycin, Merrem and clindamycin.  changed to  fortaz , steffany nebs for acinetobacter,  Also on Vanc. ID managing this. Severe hypotensive shock  needing sammi, vaso, dobutamine and levophed    on hydrocortisone for shock-->weaned off   Critical care managing  Improving hypotension, improved wbc, now only on levo     End-stage renal disease on hemodialysis. Nephrology consulted  Initiated on CRRT   Fluid removal as tolerated , CRRT running neg      Acute hypoxic resp failure  Healthcare associated pneumonia. Intubated in the ICU on 4/22. Antibiotics as above. S/p bronch 5/1/22 given worsening leucocytosis/fever  cx with acinetobacter   Ongoing weaning trials     Chronic systolic congestive heart failure, EF 25 to 30%. Ischemic CM/CAD s/p previous stents   Cardiology consulted  Hold home medications given hypotension  Off dobutamine   Considering to resume BB ,   Resume ASA , statins  Poor overall prognosis      Acute metabolic encephalopathy resolved. Secondary to septic shock  Improving   Able to follow simple commands     Type 2 diabetes.   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 Carter Smith     Paraplegia  Neurogenic bladder   - bed bound   - supportive care  - intermittent self catherizations , now has lagnley       Diet: Diet NPO  ADULT TUBE FEEDING; Orogastric; Renal Formula; Continuous; 35; Yes; 5; Q 4 hours; 40; 30; Q 4 hours; Protein; one proteinex P2Go TWICE daily via feeding tube  Code Status: Full Code    Dispo - cont care  updated family     Chloe Alfredo MD
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
## 2022-05-05 NOTE — PROGRESS NOTES
05/05/22 1837   Patient Observation   Pulse 81   Resp 18   SpO2 94 %   Breath Sounds   Right Upper Lobe Rhonchi   Right Middle Lobe Diminished   Right Lower Lobe Diminished   Left Upper Lobe Rhonchi   Left Lower Lobe Diminished   Airway Clearance   Suction ET Tube   Suction Device Suction catheter   Sputum Method Obtained Endotracheal   Sputum Amount Small   Sputum Color/Odor   (creamy)   Sputum Consistency Thick   Vent Information   Vent Mode AC/VC   Vent Settings   FiO2  45 %   Resp Rate (Set) 18 bmp   Vt (Set, mL) 490 mL   PEEP/CPAP (cmH2O) 8   Vent Patient Data (Readings)   Vt Exhaled 495 mL   Rate Measured 18 br/min   Minute Volume 8.85 Liters   Peak Flow 50 L/min   Pressure Support 0 cmH20   I:E Ratio 1:2.10   Sensitivity 3   Peak Inspiratory Pressure 30 cmH2O   Mean Airway Pressure 15 cmH20   High Peep/I Pressure 0   I Time/ I Time % 0 s   Plateau Pressure (cm H2O) 27 cm H2O   Static Compliance (L/cm H2O) 50   Dynamic Compliance (L/cm H2O) 23 L/cm H2O   Vent Alarm Settings   High Pressure  45 cmH2O   Low Minute Volume Alarm 4 L/min   RR High 40 br/min   Additional Respiratory Assessments   Position Semi-Mejía's   Humidification Source Heated wire   Humidification Temp 37   Circuit Condensation Drained

## 2022-05-05 NOTE — PROGRESS NOTES
Blood pressure 106/61, pulse 75, temperature 98.3 °F (36.8 °C), temperature source Bladder, resp. rate 18, height 6' 2\" (1.88 m), weight 250 lb 9.6 oz (113.7 kg), SpO2 93 %. Patient currently intubated with ETT at 22. Vent settings are 18/490/+8/45%. LEVOPHED infusing at a rate of 6 mcg/min. CRRT running without difficulty at this time through his R IJ tunneled line. Blood flow = 200 ml/min  Post filer, dialysate, pre filter = 500ml/hr   -50 ordered hourly removal      iCa 1.12- 1gr calcium gluconate replaced. Next labs due at 0300. BS 96. SS held at this time. Reassessment completed. No physical changes noted in assessment. Pt resting comfortably with eyes closed at this time.  Electronically signed by Ruby Ge RN on 5/5/2022 at 12:14 AM

## 2022-05-05 NOTE — PROGRESS NOTES
RT Inhaler-Nebulizer Bronchodilator Protocol Note    There is a bronchodilator order in the chart from a provider indicating to follow the RT Bronchodilator Protocol and there is an Initiate RT Inhaler-Nebulizer Bronchodilator Protocol order as well (see protocol at bottom of note). CXR Findings:  XR CHEST PORTABLE    Result Date: 5/4/2022  Supportive tubing is in normal position. Stable left basilar opacity, atelectasis versus airspace disease. XR CHEST PORTABLE    Result Date: 5/3/2022  Slight improved aeration of the right lung. Bilateral pleuroparenchymal disease remains       The findings from the last RT Protocol Assessment were as follows:   History Pulmonary Disease: (P) Chronic pulmonary disease  Respiratory Pattern: (P) Dyspnea on exertion or RR 21-25 bpm  Breath Sounds: (P) Inspiratory and expiratory or bilateral wheezing and/or rhonchi  Cough: (P) Weak, productive  Indication for Bronchodilator Therapy: (P) Decreased or absent breath sounds  Bronchodilator Assessment Score: (P) 12    Aerosolized bronchodilator medication orders have been revised according to the RT Inhaler-Nebulizer Bronchodilator Protocol below. Respiratory Therapist to perform RT Therapy Protocol Assessment initially then follow the protocol. Repeat RT Therapy Protocol Assessment PRN for score 0-3 or on second treatment, BID, and PRN for scores above 3. No Indications - adjust the frequency to every 6 hours PRN wheezing or bronchospasm, if no treatments needed after 48 hours then discontinue using Per Protocol order mode. If indication present, adjust the RT bronchodilator orders based on the Bronchodilator Assessment Score as indicated below.   Use Inhaler orders unless patient has one or more of the following: on home nebulizer, not able to hold breath for 10 seconds, is not alert and oriented, cannot activate and use MDI correctly, or respiratory rate 25 breaths per minute or more, then use the equivalent nebulizer order(s) with same Frequency and PRN reasons based on the score. If a patient is on this medication at home then do not decrease Frequency below that used at home. 0-3 - enter or revise RT bronchodilator order(s) to equivalent RT Bronchodilator order with Frequency of every 4 hours PRN for wheezing or increased work of breathing using Per Protocol order mode. 4-6 - enter or revise RT Bronchodilator order(s) to two equivalent RT bronchodilator orders with one order with BID Frequency and one order with Frequency of every 4 hours PRN wheezing or increased work of breathing using Per Protocol order mode. 7-10 - enter or revise RT Bronchodilator order(s) to two equivalent RT bronchodilator orders with one order with TID Frequency and one order with Frequency of every 4 hours PRN wheezing or increased work of breathing using Per Protocol order mode. 11-13 - enter or revise RT Bronchodilator order(s) to one equivalent RT bronchodilator order with QID Frequency and an Albuterol order with Frequency of every 4 hours PRN wheezing or increased work of breathing using Per Protocol order mode. Greater than 13 - enter or revise RT Bronchodilator order(s) to one equivalent RT bronchodilator order with every 4 hours Frequency and an Albuterol order with Frequency of every 2 hours PRN wheezing or increased work of breathing using Per Protocol order mode.          Electronically signed by Anali Yang RCP on 5/4/2022 at 8:27 PM

## 2022-05-06 NOTE — PROGRESS NOTES
05/06/22 0314   Patient Observation   Pulse 73   Resp 19   SpO2 98 %   Observations ETT SIZE 7.5, SECURED AT 25 LIP LINE.  LEFT. AMBU BAG AT HEAD OF BED. WATER GOOD. Breath Sounds   Right Upper Lobe Rhonchi   Right Middle Lobe Diminished   Right Lower Lobe Diminished   Left Upper Lobe Rhonchi   Left Lower Lobe Diminished   Airway Clearance   Suction ET Tube   Suction Device Inline suction catheter   Sputum Amount Small   Sputum Consistency Thick   Vent Information   Vent Mode AC/VC   Vent Settings   FiO2  45 %   Resp Rate (Set) 18 bmp   Vt (Set, mL) 490 mL   PEEP/CPAP (cmH2O) 8   Vent Patient Data (Readings)   Vt Exhaled 489 mL   Rate Measured 18 br/min   Minute Volume 8.89 Liters   Peak Flow 50 L/min   Pressure Support 0 cmH20   I:E Ratio 1:2.10   Sensitivity 3   Peak Inspiratory Pressure 25 cmH2O   Mean Airway Pressure 14 cmH20   High Peep/I Pressure 0   I Time/ I Time % 0 s   Plateau Pressure (cm H2O) 23 cm H2O   Vent Alarm Settings   High Pressure  45 cmH2O   Low Minute Volume Alarm 4 L/min   RR High 40 br/min   Ambu Bag With Mask At Bedside Yes   Additional Respiratory Assessments   Humidification Source Heated wire   Humidification Temp 36.8   Circuit Condensation Drained   ETT (adult)   Placement Date/Time: 04/22/22 0945   Present on Admission/Arrival: No  Placed By: Licensed provider  Placement Verified By: Chest X-ray  Preoxygenation: Yes  Mask Ventilation: Mask ventilation not attempted (0)  Technique: Rapid sequence;Direct laryng. ..    Secured At 25 cm   Measured From Lips   ETT Placement Left   Secured By Commercial tube simon   Site Assessment Dry

## 2022-05-06 NOTE — PROGRESS NOTES
Reassessment completed (see Flowsheet). All ICU lines remain intact, ICU monitoring continued-   Infusing:  Levo, KVO (See MAR)  pt remains on 8L via NC. Spoke to patient in regards to Bipap. Lung sounds Diminished with Rhonchi. pt's blood pressures WDL with LEvo. CRRT with goal of removal -50. Continuing to monitor.

## 2022-05-06 NOTE — CARE COORDINATION
INTERDISCIPLINARY PLAN OF CARE CONFERENCE    Date/Time: 5/6/2022 9:02 AM  Completed by: Nolberto GONCALVES, CONRAD  Case Management    Patient Name:  Richmond Vazquez  YOB: 1965  Admitting Diagnosis: Hyperkalemia [E87.5]  Hypoglycemia [E16.2]  ESRD on dialysis ([facility] [address] [N18.6, Z99.2]  Acute cystitis with hematuria [N30.01]  Septic shock ([facility] [address] [A41.9, R65.21]  Sepsis ([facility] [address] [A41.9]  Pneumonia due to infectious organism, unspecified laterality, unspecified part of lung [J18.9]     Admit Date/Time:  4/21/2022  1:21 PM    Chart reviewed. Interdisciplinary team contacted or reviewed plan related to patient progress and discharge plans. Disciplines included Case Management, Nursing, and Dietitian. Current Status:Ongoing  PT/OT recommendation for discharge plan of care: TBD    Expected D/C Disposition:  Home     Discharge Plan Comments: Chart review completed. Completed Interdisciplinary rounds with ICU staff. MD and RN states pt/family doesn't want a Trach. MD states they are going to plan to extubate pt. Pt is from home with family and his goal is to return. Attempted meeting with pt's wife at bedside but she was on the phone. CM will continue to follow and assist with developing discharge plans pending medical progress. Please notify CM if needs or concerns arise.     Home O2 in place on admit: Yes

## 2022-05-06 NOTE — PROGRESS NOTES
Reassessment completed (see Flowsheet). All ICU lines remain intact, ICU monitoring continued-   Infusing:  Levo. (see MAR). CRRT continues at removal of -50. Pt currently on Bipa 16/8 60%. Lung sounds Diminished. pt's MAP being kept >65 (LEvophed). Continuing to monitor.

## 2022-05-06 NOTE — PROGRESS NOTES
Inpatient Occupational Therapy  Evaluation and Treatment    Unit: ICU  Date:  5/6/2022  Patient Name:    Yamileth Lin  Admitting diagnosis:  Hyperkalemia [E87.5]  Hypoglycemia [E16.2]  ESRD on dialysis [facility]) [N18.6, Z99.2]  Acute cystitis with hematuria [N30.01]  Septic shock ([facility] [address] [A41.9, R65.21]  Sepsis ([facility] [address] [A41.9]  Pneumonia due to infectious organism, unspecified laterality, unspecified part of lung [J18.9]  Admit Date:  4/21/2022  Precautions/Restrictions/WB Status/ Lines/ Wounds/ Oxygen: fall risk, IV, bed/chair alarm, langley catheter, telemetry, continuous pulse ox, ICU monitoring, isolation precautions (Contact), code status (Full) and CRRT    Treatment Time:  [id number]  Treatment Number: 1     Billable Treatment Time: 24 minutes   Total Treatment Time:   34   minutes    Patient Goals for Therapy:  None stated      Discharge Recommendations: SNF  DME needs for discharge: defer to facility       Therapy recommendations for staff:  Assist of 2 for all bed mobility     History of Present Illness: ED note, 4/21/2022, Lay Cruzider:   \" 49 y.o. male with a past medical history of CAD, history of DVT, chronic kidney disease on dialysis Tuesday Thursday Saturday, COPD, hyperlipidemia, history of blood clots, sleep apnea, history of TIA, history of CVA, quadriplegic, CHF brought in today by EMS from dialysis for concern for hypotension. Patient states he did not complete dialysis today given his hypotension. Onset of symptoms occurred just prior to arrival to the ED. Duration of symptoms have been persistent since onset. Context includes concern for hypotension. He denies chest pain or shortness of breath. Denies any documented fevers chills nausea vomiting diarrhea. Denies abdominal pain. No aggravating symptoms. No alleviating symptoms. Otherwise denies any other complaints. Nothing seems to make symptoms better or worse.  \"     Intubated 4/22/2022 - extubated 5/6/2022   RN cleared bed level ax     Home Health S4 Level Recommendation:  NA    AM-PAC Score: AM-PAC Inpatient Daily Activity Raw Score: 10  Pt scored a 10/24 on the AM-PAC ADL Inpatient form. Current research shows that an AM-PAC score of 17 or less is typically not associated with a discharge to the patient's home setting. Information obtained from OT/PT evaluation on 7/21/2021 and edited as needed. Preadmission Environment    Pt. Lives with family (dtr [facility])  Home environment:    trilevel home  Steps to enter main floor:       ramp entry to main level where patient stays  Bathroom: does bed baths, uses colostomy and straight caths self independently  Equipment owned: [employer], hospital bed with specialized ATILIO mattress, custom w/c, ROHO cushion     Preadmission Status:  Pt. Able to drive: No  Pt Fully independent with ADLs: No  Pt. Required assistance from family for: Bathing, Cleaning, Cooking, Dressing and Laundry     Pt reports he does not receive any aide services at this time. Pt. dependent for transfers with two people using Honey Lift  History of falls No    Pain:  Yes  Rating:  moderate  Location:   Generalized, extubated this date   Pain Medicine Status:  No request made      Cognition:    A&O Person, Place and Situation   Able to follow 1 step commands, consistently. Subjective:  Pt supine in bed upon therapist arrival. Pt agreeable to work with therapy this date. Upper Extremity ROM/strength:   Grossly impaired   Limited shoulder flexion. LUE with greater ROM as compared to RUE    Fair elbow flexion, LUE with greater ROM as compared to RUE. Demonstrated composite flexion, however, unable to  writers fingers.      Unable to achieve full ROM to any BUE joints     Upper Extremity Sensation:    WFL    Upper Extremity Proprioception:  NT    Coordination and Tone:  Impaired   Unable to touch mouth with either UE     Balance:  Functional Sitting Balance:  NT   Comments:  Bed in semi fowlers   Functional Standing Balance:NT   Comments:      Bed mobility:    Supine to sit:   Not Tested  Sit to supine:   Not Tested  Rolling:    total assistance (100%) and 2 people  Scooting in sitting:  Not Tested  Scooting to head of bed:   total assistance (100%) and 2 people   Bridging:   No    Transfers:    Sit to stand:  Not Tested  Stand to sit:  Not Tested  Bed to chair:   Not Tested  Standard toilet: Not Tested  Bed to [facility]:  Not Tested    Activities of Daily Living:   UB Dressing:   moderate assistance (50%)  LB Dressing:    total assistance (100%)  UB Bathing:  Not Tested  LB Bathing:  Not Tested  Feeding:  Not Tested  Grooming:   Not Tested  Toileting:  Not Tested    Activity Tolerance:   Pt completed therapy session with No adverse symptoms noted w/activity    Positioning Needs: In bed, call light and needs in reach. Alarm Set    Exercise / Activities Initiated:   Gildardo Grew  (loop band, as pt is unable to ) theraband provided. Educated on HEP, pulling band apart in various planes. Pt demonstrated understanding. Blue sponge provided to work on  strength. Patient/Family Education:   Role of OT  Recommendations for DC  HEP    Assessment of Deficits: Pt seen for Occupational therapy evaluation in acute care setting. Pt demonstrated decreased Activity tolerance, ADLs, IADLs, Balance , Bed mobility, Strength and Transfers. Pt functioning below baseline and will likely benefit from skilled occupational therapy services to maximize safety and independence. Goal(s): To be met in 3 Visits:  1. Tolerate HOB in chair position for 5 minutes, with SpO2 remaining above 90%. 2. Pt to tolerate prateek transfer. To be met in 5 Visits:  1. Maintain trunk at midline with bed in chair position for 7 minutes. 2. Pt to independently complete HEP. 3. Pt to complete feeding with minimal assistance. Rehabilitation Potential:  Good for goals listed above.     Strengths for achieving goals include: Family Support and Pt cooperative  Barriers to achieving goals include:  Complexity of condition     Plan: To be seen 3-5 x/wk while in acute care setting for strengthening, bed mobility, transfer training, family/patient education and ADL/IADL retraining.       JUDE Huang, OTR/L   [id number]           If patient discharges from this facility prior to next visit, this note will serve as the Discharge Summary

## 2022-05-06 NOTE — PROGRESS NOTES
Care rounds completed with Dr. Lizbeth Giraldo and multidisciplinary team. Reviewed labs, meds, VS, assessment, & plan of care for today. [id number] Galina Jacques for PT/OT for BUE. Plan to extubated to \"see how patient does\"- family at bedside agrees. See dictated note and new orders for details.      RT at bedside. TF stopped.

## 2022-05-06 NOTE — PROGRESS NOTES
Set  at 2300. Initiated blood return and set change at this time.  Electronically signed by Romario Ramirez RN on 2022 at 11:05 PM
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
## 2022-05-06 NOTE — PROGRESS NOTES
05/06/22 1845   NIV Type   Equipment Type V60   Mode Bilevel   Mask Type   (UNDER THE NOSE  B. )   Settings/Measurements   IPAP 16 cmH20   CPAP/EPAP 8 cmH2O   Rate Ordered 14   Resp 24   FiO2  50 %   Vt Exhaled 570 mL   Mask Leak (lpm) 6 lpm   Comfort Level Good   Using Accessory Muscles No   SpO2 98   Patient's Home Machine No   Alarm Settings   Alarms On Y   Oxygen Therapy/Pulse Ox   SpO2 100 %

## 2022-05-06 NOTE — PROGRESS NOTES
RT Inhaler-Nebulizer Bronchodilator Protocol Note    There is a bronchodilator order in the chart from a provider indicating to follow the RT Bronchodilator Protocol and there is an Initiate RT Inhaler-Nebulizer Bronchodilator Protocol order as well (see protocol at bottom of note). CXR Findings:  XR CHEST PORTABLE    Result Date: 5/5/2022  Low lung volumes with bibasilar atelectasis. Otherwise no acute process. XR CHEST PORTABLE    Result Date: 5/4/2022  Supportive tubing is in normal position. Stable left basilar opacity, atelectasis versus airspace disease. The findings from the last RT Protocol Assessment were as follows:   History Pulmonary Disease: Chronic pulmonary disease  Respiratory Pattern: Dyspnea on exertion or RR 21-25 bpm  Breath Sounds: Inspiratory and expiratory or bilateral wheezing and/or rhonchi  Cough: Weak, productive  Indication for Bronchodilator Therapy: Decreased or absent breath sounds  Bronchodilator Assessment Score: 12    Aerosolized bronchodilator medication orders have been revised according to the RT Inhaler-Nebulizer Bronchodilator Protocol below. Respiratory Therapist to perform RT Therapy Protocol Assessment initially then follow the protocol. Repeat RT Therapy Protocol Assessment PRN for score 0-3 or on second treatment, BID, and PRN for scores above 3. No Indications - adjust the frequency to every 6 hours PRN wheezing or bronchospasm, if no treatments needed after 48 hours then discontinue using Per Protocol order mode. If indication present, adjust the RT bronchodilator orders based on the Bronchodilator Assessment Score as indicated below.   Use Inhaler orders unless patient has one or more of the following: on home nebulizer, not able to hold breath for 10 seconds, is not alert and oriented, cannot activate and use MDI correctly, or respiratory rate 25 breaths per minute or more, then use the equivalent nebulizer order(s) with same Frequency and PRN reasons based on the score. If a patient is on this medication at home then do not decrease Frequency below that used at home. 0-3 - enter or revise RT bronchodilator order(s) to equivalent RT Bronchodilator order with Frequency of every 4 hours PRN for wheezing or increased work of breathing using Per Protocol order mode. 4-6 - enter or revise RT Bronchodilator order(s) to two equivalent RT bronchodilator orders with one order with BID Frequency and one order with Frequency of every 4 hours PRN wheezing or increased work of breathing using Per Protocol order mode. 7-10 - enter or revise RT Bronchodilator order(s) to two equivalent RT bronchodilator orders with one order with TID Frequency and one order with Frequency of every 4 hours PRN wheezing or increased work of breathing using Per Protocol order mode. 11-13 - enter or revise RT Bronchodilator order(s) to one equivalent RT bronchodilator order with QID Frequency and an Albuterol order with Frequency of every 4 hours PRN wheezing or increased work of breathing using Per Protocol order mode. Greater than 13 - enter or revise RT Bronchodilator order(s) to one equivalent RT bronchodilator order with every 4 hours Frequency and an Albuterol order with Frequency of every 2 hours PRN wheezing or increased work of breathing using Per Protocol order mode. PATIENT WILL BENEFIT FROM TREATMENTS.      Electronically signed by Gerldine Gosselin, RCP on 5/5/2022 at 8:02 PM

## 2022-05-06 NOTE — PROGRESS NOTES
Speech Language Pathology  Attempt Note    Order received for swallow evaluation. Chart reviewed and pt extubated this AM. Discussed pt w/ RN who reports pt's O2 saturation dropped after giving pt few ice chips. RN and SLP agree that pt would benefit from more time following extubation prior to further PO trials. Will re-attempt when pt medically appropriate. Prema Cooper M.A.  [id number] [facility]  Speech Language Pathologist

## 2022-05-06 NOTE — PROGRESS NOTES
Comprehensive Nutrition Assessment    Type and Reason for Visit:  Reassess    Nutrition Recommendations/Plan:   1. Continue NPO status since patient was extubated this am.   2. Monitor whether SLP is able to evaluate patient, for possible diet advancement, and nutrition plan of care. 3. Please obtain an updated weight for this patient - last weight was obtained on 5/4/22. 4. Monitor nutrition-related labs, ostomy output/function, and weight trends. Malnutrition Assessment:  Malnutrition Status:   At risk for malnutrition (05/06/22 1307)    Context:  Acute Illness     Findings of the 6 clinical characteristics of malnutrition:  Energy Intake:  No significant decrease in energy intake  Weight Loss:  Greater than 5% over 1 month (- 38# or 13.2% weight loss since 4/22/22)     Body Fat Loss:  No significant body fat loss Orbital   Muscle Mass Loss:  No significant muscle mass loss Temples (temporalis)  Fluid Accumulation:  No significant fluid accumulation Extremities (BLE + 2 pitting edema)   Strength:  Not Performed    Nutrition Assessment:    patient remained unchanged from a nutritional standpoint until he was extubated this am and OG was removed + patient will remain in NPO status d/t extubation this am and difficulty with ice chips; he remains at risk for further compromise d/t NPO status, respiratory dysfunction, altered nutrition-related labs, and renal dysfunction + CRRT; will continue NPO status and monitor nutrition plan of care    Nutrition Related Findings:    patient was extubated this am to \"see how patient does\" after extubation; patient and his family do not want patient to have a trach placed; + ostomy output; TF was infusing at goal rate prior to extubation today; + CRRT continues with - 50 ml/hr at this time; + PT/OT started Wound Type: Multiple,Pressure Injury,Surgical Incision,Unstageable,Full Thickness,Partial Thickness (full thickness, trauma - pre-tib; unstageable left lower leg; full thickness, surgical - mid back; partial thickness skin loss - sacrum/buttocks)       Current Nutrition Intake & Therapies:    Average Meal Intake: NPO  Average Supplements Intake: NPO  Diet NPO  ADULT TUBE FEEDING; Orogastric; Renal Formula; Continuous; 35; Yes; 5; Q 4 hours; 40; 30; Q 4 hours; Protein; one proteinex P2Go TWICE daily via feeding tube    Anthropometric Measures:  Height: 6' 2\" (188 cm)  Ideal Body Weight (IBW): 190 lbs (86 kg)    Admission Body Weight: 288 lb 12.8 oz (131 kg) (obtained on 4/22/22; actual weight)  Current Body Weight: 250 lb 9.6 oz (113.7 kg) (obtained on 5/4/22; actual weight), 131.9 % IBW. Weight Source: Bed Scale  Current BMI (kg/m2): 32.2  Usual Body Weight: 288 lb 12.8 oz (131 kg) (obtained on 4/22/22; actual weight)  % Weight Change (Calculated): -13.2  Weight Adjustment For: Paraplegia,Amputation  Total Adjusted Percentage (Calculated): 13.4  Adjusted Ideal Body Weight (lbs) (Calculated): 164.5 lbs  Adjusted Ideal Body Weight (kg) (Calculated): 74.77 kg  Adjusted % Ideal Body Weight (Calculated): 152.3  Adjusted BMI (kg/m2) (Calculated): 36.5  BMI Categories: Obese Class 1 (BMI 30.0-34. 9)    Estimated Daily Nutrient Needs:  Energy Requirements Based On: Kcal/kg  Weight Used for Energy Requirements: Current  Energy (kcal/day): 1254 - 1596 kcals based on 11-14 kcals/kg/CBW  Weight Used for Protein Requirements: Ideal  Protein (g/day): 155 - 172 g protein based on 1.8-2.0 g/kg/IBW (+ CRRT)  Method Used for Fluid Requirements: 1 ml/kcal  Fluid (ml/day): 1254 - 1596 ml    Nutrition Diagnosis:   · Inadequate oral intake related to inadequate protein-energy intake,impaired respiratory function,renal dysfunction,increase demand for energy/nutrients as evidenced by NPO or clear liquid status due to medical condition,wounds,weight loss,dialysis,lab values      Nutrition Interventions:   Food and/or Nutrient Delivery: Continue NPO  Nutrition Education/Counseling: No recommendation at this time  Coordination of Nutrition Care: Continue to monitor while inpatient,Interdisciplinary Rounds  Plan of Care discussed with: ICU Team    Goals:  Previous Goal Met: Goal(s) Achieved  Goals: Initiate PO diet       Nutrition Monitoring and Evaluation:   Behavioral-Environmental Outcomes: None Identified  Food/Nutrient Intake Outcomes: Diet Advancement/Tolerance  Physical Signs/Symptoms Outcomes: Biochemical Data,Chewing or Swallowing,Fluid Status or Edema,Hemodynamic Status,Nutrition Focused Physical Findings,Skin,Weight    Discharge Planning:     Too soon to determine     Tha Crow, [address] [place] [address],   Contact: [phone]

## 2022-05-06 NOTE — PROGRESS NOTES
Extubated Pt. To 8 L HF NC. Spo2 99%. Pt. Tolerating extubation well. Ventilator and Bipap In room on stand by.

## 2022-05-06 NOTE — PROGRESS NOTES
Blood pressure 126/67, pulse 80, temperature 98.3 °F (36.8 °C), temperature source Bladder, resp. rate 19, height 6' 2\" (1.88 m), weight 250 lb 9.6 oz (113.7 kg), SpO2 97 %. Patient currently intubated with ETT at 22. Vent settings are 18/490/+8/45%. LEVOPHED infusing at a rate of 4 mcg/min. CRRT running without difficulty at this time through his R IJ tunneled line. Blood flow = 200 ml/min  Post filer, dialysate, pre filter = 500ml/hr   -50 ordered hourly removal    1gr calcium gluconate infusing for ica of 1.10. Reassessment completed. No physical changes noted. Dressing changed and pt repositioned.  Electronically signed by Vivien Eid RN on 5/6/2022 at 4:28 AM

## 2022-05-06 NOTE — PROGRESS NOTES
Blood pressure (!) 85/51, pulse 91, temperature 99 °F (37.2 °C), temperature source Bladder, resp. rate 22, height 6' 2\" (1.88 m), weight 250 lb 9.6 oz (113.7 kg), SpO2 96 %. Patient currently intubated with ETT at 22. Vent settings are 18/490/+8/45%. LEVOPHED infusing at a rate of 4 mcg/min. Filter change successful. Restarted at 2340. CRRT running without difficulty at this time through his R IJ tunneled line. Blood flow = 200 ml/min  Post filer, dialysate, pre filter = 500ml/hr   -50 ordered hourly removal    1 gr calcium gluconate completed for ica of 1.10  6mmol of Na phos infusing for phos of 2.3    Reassessment completed. No physical changes noted. Repositioned. No further needs at this time.  Electronically signed by Annie Christina RN on 5/6/2022 at 12:13 AM
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
## 2022-05-06 NOTE — PROGRESS NOTES
Blood pressure 102/72, pulse 84, temperature 98.2 °F (36.8 °C), temperature source Bladder, resp. rate 18, height 6' 2\" (1.88 m), weight 250 lb 9.6 oz (113.7 kg), SpO2 96 %. Patient currently intubated with ETT at 22. Vent settings are 18/490/+8/45%. LEVOPHED infusing at a rate of 5 mcg/min. CRRT running without difficulty at this time through his R IJ tunneled line. Blood flow = 200 ml/min  Post filer, dialysate, pre filter = 500ml/hr   -50 ordered hourly removal     Labs due to be collected at 2100 to check for replacement needs. BS 96, SS and lantus held at this time. Pt with hx of hypoglycemia. Shift assessment completed. Pt awake and following commands. Eyes closed and resting when not stimulated. Remains vent compliant. Rhonchi heard in the upper lobes and diminished in the lower lobes. Increased secretions today than previous shifts. SR w PVCs on monitor. BP labile w levo infusing. Does tend to drop when sleeping. LLQ colostomy intact, little liquid drainage noted. Delgado patent, britney OP. Dressings clean, dry and intact. To be changed 5/6. Repositioned. Pt denies pain at this time. No further needs noted. Update provided to reji Mckeon at bedside.  Electronically signed by Jenniffer Morrow RN on 5/5/2022 at 8:33 PM

## 2022-05-06 NOTE — PROGRESS NOTES
Shift handoff report given to Lexx Jordan RN at bedside. Pt is awake, on Bipap  IV handoff completed. 4 eyes to follow. Call light within reach, bed in lowest position, bed alarm on. End of shift checks completed. Pt has been free of falls for duration of shift. .       [id number]  Incorrect documentation of code-blue, Orders d/c.

## 2022-05-06 NOTE — PROGRESS NOTES
Page to Dr Ritchie, Pt has weak cough, and SpO2 down to 88% on 9L. Asking for medinebs or Percussion vest to help patient clear secretions. Awaiting a return call. 12:59 PM  Per Dr Sandy Eid- order for Metaneb TID. And Cough assist TID. Call placed to RT- Chest Physiotherapy not available at this time, d/t limit of machine. RT informed patient would like to be on Bipap with under-the-nose mask.
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
## 2022-05-07 NOTE — PROGRESS NOTES
445 FSBS- 57 125 of D10 W started. 545 FSBS  66  125 D10 W started. Bath completed Delgado care and wound care with dressings changed. Pt C/O pain in his R arm. ROM exercises done then arm wrapped in a warm blnket for comfort.

## 2022-05-07 NOTE — PROGRESS NOTES
05/07/22 1905   NIV Type   Equipment Type V60   Mode Bilevel   Mask Type Other (Comment)  (UNDER THE NOSE MASK. )   Settings/Measurements   IPAP 16 cmH20   CPAP/EPAP 8 cmH2O   Rate Ordered 14   Resp 22   FiO2  40 %   Vt Exhaled 513 mL   Mask Leak (lpm) 21 lpm   Comfort Level Good   Using Accessory Muscles No   SpO2 96   Patient's Home Machine No   Alarm Settings   Alarms On Y

## 2022-05-07 NOTE — PROGRESS NOTES
Reassessment completed (see Flowsheet). All ICU lines remain intact, ICU monitoring continued-   Infusing:  Levo (see MAR). pt NT suctioned, reports to improvement in chest pain, and over all discomfort. D10 125mL finished (see MAR). Pt reports improvement in Dizziness. Lung sounds Diminished/ Rhonchi. pt's blood pressures  WDL on Levo. Continuing to monitor.
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
## 2022-05-07 NOTE — PROGRESS NOTES
Able to start titrating Levophed down. Pt qarouse wikth ease. ROM to upper extremities done X 2 tonight.

## 2022-05-07 NOTE — PROGRESS NOTES
Reassessment completed (see Flowsheet). All ICU lines remain intact, ICU monitoring continued-   Infusing:  Levophed. (See MAR). BP WDL. pt remains on Airvo, to be placed on Bipap once RT rounds. Lung sounds Rhonchi/ diminished. Family at bedside and updated on POC- addressed wishes. Continuing to monitor.

## 2022-05-07 NOTE — PROGRESS NOTES
05/06/22 1714   NIV Type   Equipment Type v60   Mode Bilevel   Mask Type   (under the nose)   Mask Size   (b)   Settings/Measurements   IPAP 16 cmH20   CPAP/EPAP 8 cmH2O   Rate Ordered 14   Resp 20   FiO2  50 %   Vt Exhaled 492 mL   Minute Volume 7.5 Liters   Mask Leak (lpm) 36 lpm   Comfort Level Good   Using Accessory Muscles No   SpO2 99   Patient's Home Machine No   Patient Observation   Observations spo2 99% on 50% bipap

## 2022-05-07 NOTE — PROGRESS NOTES
SpO2 down to 87% after trial of ice chips with SLP. SX via R nare for large amounts of tenacious secretions.  SpO2 99% after NTS

## 2022-05-07 NOTE — PLAN OF CARE
Clinical swallow Evaluation completed. ST will follow   Marie Malloy MS CCC-SLP  Speech Language Pathologist

## 2022-05-07 NOTE — PROGRESS NOTES
4 Eyes Skin Assessment     The patient is being assess for   Shift Handoff    I agree that 2 RN's have performed a thorough Head to Toe Skin Assessment on the patient. ALL assessment sites listed below have been assessed. Areas assessed for pressure by both nurses:   [x]   Head, Face, and Ears   [x]   Shoulders, Back, and Chest, Abdomen  [x]   Arms, Elbows, and Hands   [x]   Coccyx, Sacrum, and Ischium  [x]   Legs, Feet, and Heels      Wounds as previousl;y described in wounds assessments   Skin Assessed Under all Medical Devices by both nurses:  BiPap  All Mepilex Borders were peeled back and area peeked at by both nurses:  Yes  Please list where Mepilex Borders are located:  Back Mepilex chaqnged             **SHARE this note so that the co-signing nurse is able to place an eSignature**    Co-signer eSignature: Electronically signed by Mandeep Mccauley RN on 5/7/22 at 6:59 AM EDT    Does the Patient have Skin Breakdown related to pressure?   Yes LDA WOUND CARE was Initiated documentation include the Chetna-wound, Wound Assessment, Measurements, Dressing Treatment, Drainage, and Color\",            Ismael Prevention initiated:  Yes   Wound Care Orders initiated:  Yes      [id number]  nurse consulted for Pressure Injury (Stage 3,4, Unstageable, DTI, NWPT, Complex wounds)and New or Established Ostomies:  Yes      Primary Nurse eSignature: Electronically signed by Norbert Winkler RN on 5/7/22 at 6:57 AM EDT

## 2022-05-07 NOTE — PROGRESS NOTES
Page to dr Ritchie. Pt on Airvo 100% 25L SpO2 80s%. Complains of Chest pain- pressure. Levo @ 3mcg. Order place for Stat EKG. RT to bedside to NT suction. Pt also complains of Dizziness- BG 79. Pt given 125mL of IV D10 (see MAR).

## 2022-05-07 NOTE — PROGRESS NOTES
Hospitalist Progress Note      PCP: Manjinder Lombardi MD    Date of Admission: 4/21/2022    Subjective:     49 y.o. male with hx of ischemic CM, paraplegia, IDDM, ESRD on HD presents with bacteremia and decubitus ulcers, septic shock     Improving sepsis from last week, off sammi, vaso, dobutamine, now remains on levophed,      Ongoing CRRT with fluid removal   No fevers    Pt seen off vent this am, feels fine and reports weak cough  - having secretions that is requiring NT suctioning.  He is on AirVO       Medications:  Reviewed    Infusion Medications    dextrose 5 % and 0.45 % NaCl 50 mL/hr at 05/07/22 1616    vasopressin (Septic Shock) infusion Stopped (04/29/22 0008)    dextrose      prismaSATE BGK 4/2.5 500 mL/hr at 05/07/22 1150    prismaSATE BGK 4/2.5 500 mL/hr at 05/07/22 1154    prismaSATE BGK 4/2.5 500 mL/hr at 05/07/22 1154    norepinephrine 4 mcg/min (05/07/22 1616)    sodium chloride Stopped (05/02/22 1630)     Scheduled Medications    epoetin bebo-epbx  3,000 Units SubCUTAneous Once per day on Mon Wed Fri    ipratropium-albuterol  1 ampule Inhalation Q4H While awake    insulin glargine  25 Units SubCUTAneous Nightly    cefTAZidime (FORTAZ) IV  1,000 mg IntraVENous 3 times per day    tobramycin (PF)  300 mg Nebulization BID    sennosides-docusate sodium  2 tablet Oral BID    midodrine  10 mg Oral TID WC    insulin lispro  0-18 Units SubCUTAneous Q4H    pantoprazole  40 mg IntraVENous Daily    menthol-zinc oxide   Topical Daily    apixaban  2.5 mg Oral BID    aspirin  81 mg Oral Nightly    sodium chloride flush  5-40 mL IntraVENous 2 times per day     PRN Meds: oxyCODONE-acetaminophen, traZODone, ipratropium-albuterol, dextrose bolus **OR** dextrose bolus, glucagon (rDNA), dextrose, potassium chloride, magnesium sulfate, calcium gluconate **OR** calcium gluconate **OR** calcium gluconate **OR** calcium gluconate, sodium phosphate IVPB **OR** sodium phosphate IVPB **OR** sodium phosphate IVPB **OR** sodium phosphate IVPB, glucose, midazolam, fentanNYL, sodium chloride flush, sodium chloride, ondansetron **OR** ondansetron, polyethylene glycol, acetaminophen **OR** acetaminophen, perflutren lipid microspheres      Intake/Output Summary (Last 24 hours) at 5/7/2022 1624  Last data filed at 5/7/2022 1616  Gross per 24 hour   Intake 1176 ml   Output 2131 ml   Net -955 ml       Physical Exam Performed:    BP 84/62   Pulse 101   Temp 97.8 °F (36.6 °C)   Resp 25   Ht 6' 2\" (1.88 m)   Wt 250 lb 9.6 oz (113.7 kg)   SpO2 99%   BMI 32.18 kg/m²       General appearance:  off vent, fully awake, alert and oriented   HEENT: NAD,  Neck: Supple, . No jugular venous distention. Trachea midline. Respiratory: Diminished breath sounds bilaterally  Cardiovascular: S 1 s2 heard,  Right chest HD catheter and Port noted  Abdomen: Soft, non-tender, +distended with normal bowel sounds. Colostomy noted in left LQ  Musculoskeletal: Right BKA with stump healthy. Superficial skin ulcers on right thigh with no active infection noted  Left LE edema with superficial ulceration noted   Skin: see Epic wound pictures, chronic exposed hardware in lower thoracic and lumbar region   Neurologic: paraplegic , atrophy of forearm and hand muscles   Fully awake  alert oriented    Labs:   Recent Labs     05/05/22  0605 05/06/22  0607 05/07/22  0425   WBC 10.8 7.9 6.0   HGB 9.5* 9.5* 9.0*   HCT 29.4* 29.8* 27.9*    192 205     Recent Labs     05/06/22  2240 05/07/22  0425 05/07/22  0935   * 133* 131*   K 4.3 4.3 4.3   CL 97* 99 97*   CO2 25 25 26   BUN 19 18 16   CREATININE <0.5* <0.5* <0.5*   CALCIUM 8.2* 8.4 8.4   PHOS 2.8 2.6 2.1*     No results for input(s): AST, ALT, BILIDIR, BILITOT, ALKPHOS in the last 72 hours. No results for input(s): INR in the last 72 hours. No results for input(s): Towana Ball in the last 72 hours.     Urinalysis:      Lab Results   Component Value Date    NITRU Negative 04/21/2022    WBCUA 21-50 04/21/2022    BACTERIA 4+ 04/21/2022    RBCUA  04/21/2022    BLOODU LARGE 04/21/2022    SPECGRAV >=1.030 04/21/2022    GLUCOSEU Negative 04/21/2022    GLUCOSEU >=1000 mg/dL 08/31/2010       Radiology:  XR CHEST PORTABLE   Final Result   Stable supportive devices. Increasing vascular congestion/mild pulmonary   edema. XR CHEST PORTABLE   Final Result   Low lung volumes with bibasilar atelectasis. Otherwise no acute process. XR CHEST PORTABLE   Final Result   Supportive tubing is in normal position. Stable left basilar opacity, atelectasis versus airspace disease. XR CHEST PORTABLE   Final Result   Slight improved aeration of the right lung. Bilateral pleuroparenchymal   disease remains         XR CHEST PORTABLE   Final Result   No significant change compared to chest x-ray from 05/01/2022. XR CHEST PORTABLE   Final Result   Low lung volumes with patchy airspace disease which may represent multifocal   atelectasis. Overall stable appearing chest.  Possible trace effusions. XR CHEST PORTABLE   Final Result   Relatively stable appearance of the chest with bibasilar airspace opacities. XR CHEST PORTABLE   Final Result   Low volume study with bilateral atelectasis or airspace disease, similar to   prior, with persistent small pleural effusions. XR CHEST PORTABLE   Final Result   Stable chest.         XR CHEST PORTABLE   Final Result   Endotracheal tube tip projects at the mid intrathoracic trachea. Otherwise   stable chest.         XR CHEST PORTABLE   Final Result   Suboptimal examination due to patient rotation. The chest appears stable. XR CHEST PORTABLE   Final Result   Endotracheal tube tip projects at the thoracic inlet.   Otherwise stable chest.         XR CHEST PORTABLE   Final Result   Stable chest.         XR CHEST PORTABLE   Final Result   Stable endotracheal tube located approximately 3.3 cm above the quintin. Low lung volumes. Suspected small right pleural effusion. XR CHEST PORTABLE   Final Result   Endotracheal tube in satisfactory position above the quintin      Bibasilar hypoaeration persist         CT ABDOMEN PELVIS W IV CONTRAST Additional Contrast? None   Final Result   1. Moderate amount of ascites with 3rd spacing including edematous changes   throughout the abdomen and anasarca. 2. Delgado in place. Diffuse bladder wall thickening. Correlate for   underlying cystitis. 3. No acute bowel pathology. Mild gastric distension. Diverticulosis with   no acute features. 4. Mild renal cortical scarring and asymmetric right renal atrophy, stable. No hydronephrosis. CT CHEST PULMONARY EMBOLISM W CONTRAST   Final Result   1. No evidence of acute pulmonary embolism. There is some thickening and   mild irregularity in the pulmonary arteries in the left lower lobe which may   represent chronic pulmonary embolism or secondary appearance to inflammation. No evidence of aortic aneurysm or dissection. 2. Moderate right effusion and right lower lobe atelectatic and/or   consolidative changes. Pneumonia is likely. There is severe loss of volume   in the right lung. Trace left effusion and left lower lobe atelectatic   changes are seen. 3. Moderate ascites around the spleen and liver. XR CHEST PORTABLE   Final Result   Low lung volumes. Bilateral pleural effusions with bibasilar volume loss,   right greater than left. No overt failure. XR CHEST PORTABLE    (Results Pending)       Blood - 4/21 - Group A , strep pyogenes and Enterococcus Faecalis   Repeat blood - NG 4/23    Urine - Ecoli , Enterococcus Faecalis     Sputum- Acinetobacter baumannii    Assessment/Plan:    Septic shock. Enterococcus faecalis and strep pyogenes bacteremia.   Right lower extremity cellulitis  Chronic pressure ulcers on the back with exposed hardware      Source could be HD line vs exposed hardware  Patient was on vancomycin, Merrem and clindamycin.  changed to  fortaz , steffany nebs for acinetobacter,  Also on Vanc. ID managing this. Severe hypotensive shock  needing sammi, vaso, dobutamine and levophed    on hydrocortisone for shock-->weaned off   Critical care managing  Improving hypotension, improved wbc, now only on levo     End-stage renal disease on hemodialysis. Nephrology consulted  Initiated on CRRT   Fluid removal as tolerated , CRRT running neg      Acute hypoxic resp failure  Healthcare associated pneumonia. Intubated in the ICU on 4/22. Antibiotics as above. S/p bronch 5/1/22 given worsening leucocytosis/fever  cx with acinetobacter   Weaned off vent now  IV ceftaz and steffany nebs   - he is agreeable with re intubation and tracheostomy if necessary       Chronic systolic congestive heart failure, EF 25 to 30%. Ischemic CM/CAD s/p previous stents   Cardiology consulted  Hold home medications given hypotension  Off dobutamine   Considering to resume BB ,   Resume ASA , statins       Acute metabolic encephalopathy resolved. Secondary to septic shock  resolved     Type 2 diabetes.   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 Shahla Sen     Paraplegia  Neurogenic bladder   - bed bound   - supportive care  - intermittent self catherizations , now has langley       Diet: Diet NPO  ADULT TUBE FEEDING; Orogastric; Renal Formula; Continuous; 35; Yes; 5; Q 4 hours; 40; 30; Q 4 hours; Protein; one proteinex P2Go TWICE daily via feeding tube  Code Status: Full Code    Dispo - cont care  updated family     Elenita Camara MD 5/7/2022 4:27 PM

## 2022-05-07 NOTE — PROGRESS NOTES
Care rounds completed with Dr. Miroslava Schneider and multidisciplinary team. Reviewed labs, meds, VS, assessment, & plan of care for today. informed of Low BG, Wore Bipap all night. And continued Levophed.  See dictated note and new orders for details.

## 2022-05-07 NOTE — PROGRESS NOTES
RT Inhaler-Nebulizer Bronchodilator Protocol Note    There is a bronchodilator order in the chart from a provider indicating to follow the RT Bronchodilator Protocol and there is an Initiate RT Inhaler-Nebulizer Bronchodilator Protocol order as well (see protocol at bottom of note). CXR Findings:  XR CHEST PORTABLE    Result Date: 5/6/2022  Stable supportive devices. Increasing vascular congestion/mild pulmonary edema. XR CHEST PORTABLE    Result Date: 5/5/2022  Low lung volumes with bibasilar atelectasis. Otherwise no acute process. The findings from the last RT Protocol Assessment were as follows:   History Pulmonary Disease: Chronic pulmonary disease  Respiratory Pattern: Dyspnea on exertion or RR 21-25 bpm  Breath Sounds: Inspiratory and expiratory or bilateral wheezing and/or rhonchi  Cough: Weak, productive  Indication for Bronchodilator Therapy: Decreased or absent breath sounds  Bronchodilator Assessment Score: 12    Aerosolized bronchodilator medication orders have been revised according to the RT Inhaler-Nebulizer Bronchodilator Protocol below. Respiratory Therapist to perform RT Therapy Protocol Assessment initially then follow the protocol. Repeat RT Therapy Protocol Assessment PRN for score 0-3 or on second treatment, BID, and PRN for scores above 3. No Indications - adjust the frequency to every 6 hours PRN wheezing or bronchospasm, if no treatments needed after 48 hours then discontinue using Per Protocol order mode. If indication present, adjust the RT bronchodilator orders based on the Bronchodilator Assessment Score as indicated below.   Use Inhaler orders unless patient has one or more of the following: on home nebulizer, not able to hold breath for 10 seconds, is not alert and oriented, cannot activate and use MDI correctly, or respiratory rate 25 breaths per minute or more, then use the equivalent nebulizer order(s) with same Frequency and PRN reasons based on the score.  If a patient is on this medication at home then do not decrease Frequency below that used at home. 0-3 - enter or revise RT bronchodilator order(s) to equivalent RT Bronchodilator order with Frequency of every 4 hours PRN for wheezing or increased work of breathing using Per Protocol order mode. 4-6 - enter or revise RT Bronchodilator order(s) to two equivalent RT bronchodilator orders with one order with BID Frequency and one order with Frequency of every 4 hours PRN wheezing or increased work of breathing using Per Protocol order mode. 7-10 - enter or revise RT Bronchodilator order(s) to two equivalent RT bronchodilator orders with one order with TID Frequency and one order with Frequency of every 4 hours PRN wheezing or increased work of breathing using Per Protocol order mode. 11-13 - enter or revise RT Bronchodilator order(s) to one equivalent RT bronchodilator order with QID Frequency and an Albuterol order with Frequency of every 4 hours PRN wheezing or increased work of breathing using Per Protocol order mode. Greater than 13 - enter or revise RT Bronchodilator order(s) to one equivalent RT bronchodilator order with every 4 hours Frequency and an Albuterol order with Frequency of every 2 hours PRN wheezing or increased work of breathing using Per Protocol order mode. Patient will benefit from treatments.      Electronically signed by Adriana Bonner RCP on 5/6/2022 at 9:45 PM

## 2022-05-08 NOTE — PROGRESS NOTES
05/08/22 1906   NIV Type   Mode Bilevel   Settings/Measurements   IPAP 16 cmH20   CPAP/EPAP 8 cmH2O   Rate Ordered 14   Resp 22   FiO2  35 %   Vt Exhaled 742 mL   Comfort Level Good   SpO2 100

## 2022-05-08 NOTE — PROGRESS NOTES
05/08/22 0235   NIV Type   $NIV $Daily Charge   Equipment Type V60   Mode Bilevel   Mask Type Other (Comment)  (UNDER THE NOSE)   Settings/Measurements   IPAP 16 cmH20   CPAP/EPAP 8 cmH2O   Rate Ordered 14   Resp 22   FiO2  40 %   Vt Exhaled 597 mL   Mask Leak (lpm) 5 lpm   Comfort Level Good   Using Accessory Muscles No   SpO2 100   Patient's Home Machine No   Alarm Settings   Alarms On Y

## 2022-05-08 NOTE — PROGRESS NOTES
Dressing changed to back and right thigh, patient bathed, langley care complete, linens changed, and repositioned. Did well with CRRT, attempted to wean levophed but BP MAP dropped returned to rate of 4mcg/min. SR on monitor with frequent PVC's, lung sounds diminished/rhonchi, small sputum brought up per patient. Denies needs, call light in reach.       PH from [facility] 7.278
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
## 2022-05-08 NOTE — PROGRESS NOTES
Care rounds completed with Dr. Jorden Roberson and multidisciplinary team. Reviewed labs, meds, VS, assessment, & plan of care for today. Informed of SLP failure, and not having Eliquis since intbuation- order for Heparin gtt. Order for Chest physiotherapy- previous day. ABG obtained.  See dictated note and new orders for details.      Family at bedside and updated on POC>

## 2022-05-08 NOTE — PROGRESS NOTES
Pharmacy to Manage Heparin Infusion per [facility]    Dx:H/O dvt  Pt wt = _94 adjusted__ (will use adjusted wt if actual body weight > 120% ideal body weight). Baseline anti-Xa and/or aPTT =    Oral factor Xa-inhibitors may alter and elevate anti-Xa levels used for unfractionated heparin monitoring. As a result, anti-Xa monitoring is not accurate while Xa-inhibitor activity is detectable. Utilize aPTT monitoring when patient received an oral factor Xa-inhibitor (apixaban, betrixaban, edoxaban or rivaroxaban) within 72 hours prior to admission (please document last administration time). The goal is to allow a washout of oral factor Xa-inhibitors by using aPTT for 72 hours, then change to ant-Xa levels for UFH. Low Dose Heparin Infusion  Heparin 60 units/kg IVP bolus followed by Heparin infusion at 12 units/kg/hr (recommended initial max dose 1000 units./hr). Recheck aPTT in 6 hours. Goal aPTT = 60-90 seconds.     Eli Alexander, [facility]

## 2022-05-08 NOTE — PROGRESS NOTES
Reassessment completed (see Flowsheet). All ICU lines remain intact, ICU monitoring continued-   Infusing:  D5 NS (see MAR). pt remains on CRRT goal continues to be to remove 50ml qh.    Lung sounds diminished with Rhonchi. Redness noted to L Lateral Heel- foot elevated on pillow. Difficult to assess. Continuing to monitor. 6:29 PM  IR nurse at bedside for midline placement.

## 2022-05-08 NOTE — PROGRESS NOTES
Shift handoff report given to Vinay Gasca RN at bedside. Pt isAwake and alert  IV handoff completed. Call light within reach, bed in lowest position, bed alarm on. End of shift checks completed. Pt has been free of falls for duration of shift. Krystal Aguilar

## 2022-05-08 NOTE — FLOWSHEET NOTE
Assessment complete, lung sounds improved, RT completed chest PT.  ROM exercises done to arms and left leg. Tolerating CRRT, BP doing well, decreased levophed rate from 4mcg/min to 3 mcg/min, will continue to monitor. Bed pad changed, patient turned and repositioned.

## 2022-05-08 NOTE — PROGRESS NOTES
RT Inhaler-Nebulizer Bronchodilator Protocol Note    There is a bronchodilator order in the chart from a provider indicating to follow the RT Bronchodilator Protocol and there is an Initiate RT Inhaler-Nebulizer Bronchodilator Protocol order as well (see protocol at bottom of note). CXR Findings:  XR CHEST PORTABLE    Result Date: 5/6/2022  Stable supportive devices. Increasing vascular congestion/mild pulmonary edema. The findings from the last RT Protocol Assessment were as follows:   History Pulmonary Disease: Chronic pulmonary disease  Respiratory Pattern: Dyspnea on exertion or RR 21-25 bpm  Breath Sounds: Inspiratory and expiratory or bilateral wheezing and/or rhonchi  Cough: Weak, productive  Indication for Bronchodilator Therapy: Decreased or absent breath sounds  Bronchodilator Assessment Score: 12    Aerosolized bronchodilator medication orders have been revised according to the RT Inhaler-Nebulizer Bronchodilator Protocol below. Respiratory Therapist to perform RT Therapy Protocol Assessment initially then follow the protocol. Repeat RT Therapy Protocol Assessment PRN for score 0-3 or on second treatment, BID, and PRN for scores above 3. No Indications - adjust the frequency to every 6 hours PRN wheezing or bronchospasm, if no treatments needed after 48 hours then discontinue using Per Protocol order mode. If indication present, adjust the RT bronchodilator orders based on the Bronchodilator Assessment Score as indicated below. Use Inhaler orders unless patient has one or more of the following: on home nebulizer, not able to hold breath for 10 seconds, is not alert and oriented, cannot activate and use MDI correctly, or respiratory rate 25 breaths per minute or more, then use the equivalent nebulizer order(s) with same Frequency and PRN reasons based on the score. If a patient is on this medication at home then do not decrease Frequency below that used at home.     0-3 - enter or revise RT bronchodilator order(s) to equivalent RT Bronchodilator order with Frequency of every 4 hours PRN for wheezing or increased work of breathing using Per Protocol order mode. 4-6 - enter or revise RT Bronchodilator order(s) to two equivalent RT bronchodilator orders with one order with BID Frequency and one order with Frequency of every 4 hours PRN wheezing or increased work of breathing using Per Protocol order mode. 7-10 - enter or revise RT Bronchodilator order(s) to two equivalent RT bronchodilator orders with one order with TID Frequency and one order with Frequency of every 4 hours PRN wheezing or increased work of breathing using Per Protocol order mode. 11-13 - enter or revise RT Bronchodilator order(s) to one equivalent RT bronchodilator order with QID Frequency and an Albuterol order with Frequency of every 4 hours PRN wheezing or increased work of breathing using Per Protocol order mode. Greater than 13 - enter or revise RT Bronchodilator order(s) to one equivalent RT bronchodilator order with every 4 hours Frequency and an Albuterol order with Frequency of every 2 hours PRN wheezing or increased work of breathing using Per Protocol order mode. Patient will benefit from treatments.      Electronically signed by Dayanna Portillo RCP on 5/7/2022 at 9:24 PM

## 2022-05-08 NOTE — PROGRESS NOTES
Reassessment completed (see Flowsheet). All ICU lines remain intact, ICU monitoring continued-   Infusing:  Levo, D5 1/2NS, Heparin (see MAR). pt remains on CRRT goal -50. Lung sounds Rhonchi/ Diminished. RT at bedside-NT suctioning. Continuing to monitor.

## 2022-05-08 NOTE — PROGRESS NOTES
05/07/22 2305   NIV Type   Equipment Type V60   Mode Bilevel   Mask Type Other (Comment)  (UNDER THE NOSE. )   Settings/Measurements   IPAP 16 cmH20   CPAP/EPAP 8 cmH2O   Rate Ordered 14   Resp 23   FiO2  40 %   Vt Exhaled 543 mL   Mask Leak (lpm) 18 lpm   Comfort Level Good   Using Accessory Muscles No   SpO2 95   Patient's Home Machine No   Alarm Settings   Alarms On Y   Oxygen Therapy/Pulse Ox   SpO2 100 %

## 2022-05-08 NOTE — PROGRESS NOTES
Upon arrival to place midline assessed chart for issues related to midline placement, check for consent, and did time out with RN Angeli Escalera. Pt. Tolerated midline placement well, no difficulty accessing basilic vein, threaded well, with free flowing blood return.  Reported off to [facility]

## 2022-05-08 NOTE — FLOWSHEET NOTE
05/08/22 1600   Vitals   BP (!) 160/139     Levophed Paused. After moving BP cuff, and multiple rechecks. Levophed Paused.

## 2022-05-08 NOTE — PROGRESS NOTES
RT Nebulizer Bronchodilator Protocol Note    There is a bronchodilator order in the chart from a provider indicating to follow the RT Bronchodilator Protocol and there is an Initiate RT Bronchodilator Protocol order as well (see protocol at bottom of note). CXR Findings:  No results found. The findings from the last RT Protocol Assessment were as follows:  Smoking: (P) Chronic pulmonary disease  Respiratory Pattern: (P) Dyspnea on exertion or RR 21-25 bpm  Breath Sounds: (P) Slightly diminished and/or crackles  Cough: (P) Weak, productive  Indication for Bronchodilator Therapy: (P) On home bronchodilators  Bronchodilator Assessment Score: (P) 8    Aerosolized bronchodilator medication orders have been revised according to the RT Nebulizer Bronchodilator Protocol below. Respiratory Therapist to perform RT Therapy Protocol Assessment initially then follow the protocol. Repeat RT Therapy Protocol Assessment PRN for score 0-3 or on second treatment, BID, and PRN for scores above 3. No Indications - adjust the frequency to every 6 hours PRN wheezing or bronchospasm, if no treatments needed after 48 hours then discontinue using Per Protocol order mode. If indication present, adjust the RT bronchodilator orders based on the Bronchodilator Assessment Score as indicated below. If a patient is on this medication at home then do not decrease Frequency below that used at home. 0-3 - enter or revise RT bronchodilator order(s) to equivalent RT Bronchodilator order with Frequency of every 4 hours PRN for wheezing or increased work of breathing using Per Protocol order mode. 4-6 - enter or revise RT Bronchodilator order(s) to two equivalent RT bronchodilator orders with one order with BID Frequency and one order with Frequency of every 4 hours PRN wheezing or increased work of breathing using Per Protocol order mode.          7-10 - enter or revise RT Bronchodilator order(s) to two equivalent RT bronchodilator orders with one order with TID Frequency and one order with Frequency of every 4 hours PRN wheezing or increased work of breathing using Per Protocol order mode. 11-13 - enter or revise RT Bronchodilator order(s) to one equivalent RT bronchodilator order with QID Frequency and an Albuterol order with Frequency of every 4 hours PRN wheezing or increased work of breathing using Per Protocol order mode. Greater than 13 - enter or revise RT Bronchodilator order(s) to one equivalent RT bronchodilator order with every 4 hours Frequency and an Albuterol order with Frequency of every 2 hours PRN wheezing or increased work of breathing using Per Protocol order mode. RT to enter RT Home Evaluation for COPD & MDI Assessment order using Per Protocol order mode.     Electronically signed by Felisa Kaplan RCP on 5/8/2022 at 10:05 AM

## 2022-05-08 NOTE — PROGRESS NOTES
RT Inhaler-Nebulizer Bronchodilator Protocol Note    There is a bronchodilator order in the chart from a provider indicating to follow the RT Bronchodilator Protocol and there is an Initiate RT Inhaler-Nebulizer Bronchodilator Protocol order as well (see protocol at bottom of note). CXR Findings:  No results found. The findings from the last RT Protocol Assessment were as follows:   History Pulmonary Disease: (P) Chronic pulmonary disease  Respiratory Pattern: (P) Dyspnea on exertion or RR 21-25 bpm  Breath Sounds: (P) Inspiratory and expiratory or bilateral wheezing and/or rhonchi  Cough: (P) Weak, productive  Indication for Bronchodilator Therapy: (P) On home bronchodilators  Bronchodilator Assessment Score: (P) 12    Aerosolized bronchodilator medication orders have been revised according to the RT Inhaler-Nebulizer Bronchodilator Protocol below. Respiratory Therapist to perform RT Therapy Protocol Assessment initially then follow the protocol. Repeat RT Therapy Protocol Assessment PRN for score 0-3 or on second treatment, BID, and PRN for scores above 3. No Indications - adjust the frequency to every 6 hours PRN wheezing or bronchospasm, if no treatments needed after 48 hours then discontinue using Per Protocol order mode. If indication present, adjust the RT bronchodilator orders based on the Bronchodilator Assessment Score as indicated below. Use Inhaler orders unless patient has one or more of the following: on home nebulizer, not able to hold breath for 10 seconds, is not alert and oriented, cannot activate and use MDI correctly, or respiratory rate 25 breaths per minute or more, then use the equivalent nebulizer order(s) with same Frequency and PRN reasons based on the score. If a patient is on this medication at home then do not decrease Frequency below that used at home.     0-3 - enter or revise RT bronchodilator order(s) to equivalent RT Bronchodilator order with Frequency of every 4 hours PRN for wheezing or increased work of breathing using Per Protocol order mode. 4-6 - enter or revise RT Bronchodilator order(s) to two equivalent RT bronchodilator orders with one order with BID Frequency and one order with Frequency of every 4 hours PRN wheezing or increased work of breathing using Per Protocol order mode. 7-10 - enter or revise RT Bronchodilator order(s) to two equivalent RT bronchodilator orders with one order with TID Frequency and one order with Frequency of every 4 hours PRN wheezing or increased work of breathing using Per Protocol order mode. 11-13 - enter or revise RT Bronchodilator order(s) to one equivalent RT bronchodilator order with QID Frequency and an Albuterol order with Frequency of every 4 hours PRN wheezing or increased work of breathing using Per Protocol order mode. Greater than 13 - enter or revise RT Bronchodilator order(s) to one equivalent RT bronchodilator order with every 4 hours Frequency and an Albuterol order with Frequency of every 2 hours PRN wheezing or increased work of breathing using Per Protocol order mode.          Electronically signed by Olinda Carrasco RCP on 5/8/2022 at 7:10 PM

## 2022-05-08 NOTE — PROGRESS NOTES
Pharmacy - RE:  Low-dose Heparin drip  Current rate = 13.3 mL/hr  (1330 units/hr)  aPTT drawn 5/8 @ 15:15 = 124.9 sec. Goal aPTT = 60 - 90 sec. Per protocol, Nursing notified to turn off infusion x 1 hour. Will restart heparin infusion at 17:00 at a rate of 10.5 mL/hr (1050 units/hr) and obtain another aPTT on 5/8 @ 23:00.   MATTHEW Leonardo [facility]

## 2022-05-08 NOTE — PROGRESS NOTES
Patient assessment complete, patient on BiPAP FiO2 40%, Bk 99%, EPAP 8cmH2O. Respirations easy and even, lung sounds rhonchi throughout, diminished bases. Bowel sounds hypoactive, watery stool noted in ostomy pouch, NPO for failed swallow eval, PO medications on hold, blood sugar 90, on D5% 0.45% NS @ 50ml/hr. Also on Levophed at 4mcg/min, normally takes midodrine PO. Urine tea colored cloudy per langley catheter. CRRT running, tolerating well. Current settings:  Blood flow 200, dialysate 500, pre replacement fluid 500, post replacement fluid 500. Refused turn at this time, labs due soon, call light in reach, will continue to monitor.

## 2022-05-08 NOTE — PROGRESS NOTES
Inpatient Physical Therapy Evaluation and Treatment    Unit: ICU  Date:  5/8/2022  Patient Name:    Clark Villarreal  Admitting diagnosis:  Hyperkalemia [E87.5]  Hypoglycemia [E16.2]  ESRD on dialysis [facility]) [N18.6, Z99.2]  Acute cystitis with hematuria [N30.01]  Septic shock ([facility] [address] [A41.9, R65.21]  Sepsis ([facility] [address] [A41.9]  Pneumonia due to infectious organism, unspecified laterality, unspecified part of lung [J18.9]  Admit Date:  4/21/2022  Precautions/Restrictions/WB Status/ Lines/ Wounds/ Oxygen: Fall risk, Bed/chair alarm, Lines -IV, Supplemental O2 (FiO2 40%, 35L/min of O2), Delgado catheter and R IJ double lumen catheter, Colostomy LUQ, Telemetry, Continuous pulse oximetry and Isolation Precautions: Contact, on CRRT, L BKA  paraplegia (S/P T 7 burst fracture 2013)    Treatment Time:  1530 - 1600  Treatment Number:  1   Timed Code Treatment Minutes: 20 minutes  Total Treatment Minutes:  30  minutes    Patient Goals for Therapy: \" Go home \"          Discharge Recommendations: SNF  DME needs for discharge: defer to facility       Therapy recommendation for EMS Transport: requires transport by cot due to need of lift equipment for functional transfers    Therapy recommendations for staff:   Assist of 2 for bed mobility    History of Present Illness: ED note, 4/21/2022, Luis Daniel :   \" 49 y. o. male with a past medical history of CAD, history of DVT, chronic kidney disease on dialysis Tuesday Thursday Saturday, COPD, hyperlipidemia, history of blood clots, sleep apnea, history of TIA, history of CVA, quadriplegic, CHF brought in today by EMS from dialysis for concern for hypotension.  Patient states he did not complete dialysis today given his hypotension. Onset of symptoms occurred just prior to arrival to the ED.  Duration of symptoms have been persistent since onset.  Context includes concern for hypotension.  He denies chest pain or shortness of breath.  Denies any documented fevers chills nausea vomiting diarrhea.  Denies abdominal pain.  No aggravating symptoms.  No alleviating symptoms.  Otherwise denies any other complaints.  Nothing seems to make symptoms better or worse. \"      Intubated 4/22/2022 - extubated 5/6/2022   RN cleared bed level ax     Home Health S4 Level Recommendation:  Level 3 Safety  AM-PAC Mobility Score    AM-PAC Inpatient Mobility Raw Score : 7       Preadmission Environment    Pt. [place] family (dtr Isidoro Cockayne and patient's mother)  Home environment:    trilevel home  Steps to enter main floor:       ramp entry to main level where patient stays  Bathroom: does bed baths, uses colostomy and straight caths self independently  Equipment owned: Honey lift, hospital bed with specialized ATILIO mattress, custom w/c, ROHO cushion     Preadmission Status:  Pt. Able to drive: No  Pt Fully independent with ADLs: No  Pt. Required assistance from family for: Bathing, Cleaning, Cooking, Dressing and Laundry    Pt reports he does not receive any aide services at this time. Pt. dependent for transfers with two people using [employer] Lift  History of falls No    Pain   No    Cognition    A&O Person and Place   Able to follow 1 step commands    Subjective  Patient lying supine in bed with family at bedside. Pt agreeable to this PT eval & tx. Upper Extremity ROM/Strength  Please see OT evaluation. Lower Extremity ROM / Strength   AROM WFL: No  ROM limitations: limited hip knee flexion bilaterally with L plantarflexed foot with contracture. Limited knee flexion with contracture. Strength Assessment (measured on a 0-5 scale): and Formal strength testing deferred paraplegia and absent LE muscle strength. Lower Extremity Sensation    Impaired    Lower Extremity Proprioception:   Impaired    Coordination and Tone  Diminished in UE    Balance  Sitting:  Not tested; Not Tested  Comments: unsafe to attempt (cleared by RN for ROM only)    Standing: Not tested;  Not Tested  Comments: N/A due to paraplegia    Bed Mobility   Supine to Sit:    Not Tested  Sit to Supine:   Not Tested  Rolling:   Not Tested  Scooting in sitting: Not Tested  Scooting in supine:  Not Tested   Comment: Patient was cleared by RN for ROM only    Transfer Training     Sit to stand:   N/A  Stand to sit:   N/A  Bed to Chair:   Not Tested with use of N/A    Gait pt is non-ambulatory at baseline; pt ambulated 0 ft. Stair Training deferred, pt does not have stairs in home environment    Activity Tolerance   Pt completed therapy session with No adverse symptoms noted w/activity  SpO2: 100%  HR: 97 bpm    Positioning Needs   Pt in bed, continuous ICU monitoring, positioned in proper neutral alignment and pressure relief provided. Call light provided and all needs within reach    Exercises Initiated  all completed bilaterally unless indicated  PROM provided in the form of hip flexion and abduction, LLE hip knee flexion, ankle pump on the LLE x 10 reps    Other  None. Patient/Family Education   Pt educated on role of inpatient PT, POC, importance of continued activity, DC recommendations, safety awareness, transfer techniques and calling for assist with mobility. Assessment  Pt seen for Physical Therapy evaluation in acute care setting. Pt demonstrated decreased Activity tolerance, Balance, ROM, Safety and Strength as well as decreased independence with Bed Mobility  and Transfers. Recommending SNF upon discharge as patient functioning well below baseline, demonstrates good rehab potential and unable to return home due to limited or no family support, burden of care beyond caregiver ability, home environment not conducive to patient recovery and limited safety awareness. Goals : To be met in 3 visits:  1). Independent with LE Ex x 10 reps    To be met in 6 visits:  1). Patient will be to initiate rolling on either side of the bed with Max A x 2 persons  2).  Patient will be able to cross midline to improve Poor + trunk control to participate in ADLs. Rehabilitation Potential: Fair  Strengths for achieving goals include:   Pt cooperative   Barriers to achieving goals include:    Complexity of condition and Weakness    Plan    To be seen for 2-3x/week while in acute care setting for therapeutic exercises, bed mobility, transfers, progressive gait training, balance training, and family/patient education. Signature: Phyllis Salazar MS PT, # [id number]    If patient discharges from this facility prior to next visit, this note will serve as the Discharge Summary.

## 2022-05-08 NOTE — PROGRESS NOTES
Hospitalist Progress Note      PCP: Chanell Cooper MD    Date of Admission: 4/21/2022    Subjective:     49 y.o. male with hx of ischemic CM, paraplegia, IDDM, ESRD on HD presents with bacteremia and decubitus ulcers, septic shock     Improving sepsis from last week, off sammi, vaso, dobutamine, now remains on levophed,      Ongoing CRRT with fluid removal   No fevers    Pt seen off vent this am, feels fine and reports weak cough  - having secretions that is requiring NT suctioning. He is on AirVO      5/8-he failed swallowing eval.  He used BiPAP last night. Mentation is normal.  On Airvo with FiO2 45%.     Medications:  Reviewed    Infusion Medications    heparin (PORCINE) Infusion 12 Units/kg/hr (05/08/22 1016)    dextrose 5 % and 0.45 % NaCl 50 mL/hr at 05/08/22 1008    vasopressin (Septic Shock) infusion Stopped (04/29/22 0008)    dextrose      prismaSATE BGK 4/2.5 500 mL/hr at 05/08/22 0748    prismaSATE BGK 4/2.5 500 mL/hr at 05/08/22 0748    prismaSATE BGK 4/2.5 500 mL/hr at 05/08/22 0747    norepinephrine 3 mcg/min (05/08/22 1008)    sodium chloride Stopped (05/02/22 1630)     Scheduled Medications    epoetin bebo-epbx  3,000 Units SubCUTAneous Once per day on Mon Wed Fri    ipratropium-albuterol  1 ampule Inhalation Q4H While awake    insulin glargine  25 Units SubCUTAneous Nightly    cefTAZidime (FORTAZ) IV  1,000 mg IntraVENous 3 times per day    tobramycin (PF)  300 mg Nebulization BID    sennosides-docusate sodium  2 tablet Oral BID    midodrine  10 mg Oral TID WC    insulin lispro  0-18 Units SubCUTAneous Q4H    pantoprazole  40 mg IntraVENous Daily    menthol-zinc oxide   Topical Daily    aspirin  81 mg Oral Nightly    sodium chloride flush  5-40 mL IntraVENous 2 times per day     PRN Meds: heparin (porcine), heparin (porcine), oxyCODONE-acetaminophen, traZODone, ipratropium-albuterol, dextrose bolus **OR** dextrose bolus, glucagon (rDNA), dextrose, potassium chloride, magnesium sulfate, calcium gluconate **OR** calcium gluconate **OR** calcium gluconate **OR** calcium gluconate, sodium phosphate IVPB **OR** sodium phosphate IVPB **OR** sodium phosphate IVPB **OR** sodium phosphate IVPB, glucose, midazolam, fentanNYL, sodium chloride flush, sodium chloride, ondansetron **OR** ondansetron, polyethylene glycol, acetaminophen **OR** acetaminophen, perflutren lipid microspheres      Intake/Output Summary (Last 24 hours) at 5/8/2022 1047  Last data filed at 5/8/2022 1008  Gross per 24 hour   Intake 1897 ml   Output 3247 ml   Net -1350 ml       Physical Exam Performed:    /73   Pulse 100   Temp 97.4 °F (36.3 °C) (Bladder)   Resp 26   Ht 6' 2\" (1.88 m)   Wt 243 lb 11.2 oz (110.5 kg)   SpO2 96%   BMI 31.29 kg/m²       General appearance:  off vent, fully awake, alert and oriented   HEENT: NAD,  Neck: Supple, . No jugular venous distention. Trachea midline. Respiratory: Diminished breath sounds bilaterally  Cardiovascular: S 1 s2 heard,  Right chest HD catheter and Port noted  Abdomen: Soft, non-tender, +distended with normal bowel sounds. Colostomy noted in left LQ  Musculoskeletal: Right BKA with stump healthy. Superficial skin ulcers on right thigh with no active infection noted  Left LE edema with superficial ulceration noted   Skin: see Epic wound pictures, chronic exposed hardware in lower thoracic and lumbar region   Neurologic: paraplegic , atrophy of forearm and hand muscles   Fully awake  alert oriented    Labs:   Recent Labs     05/06/22  0607 05/07/22  0425 05/08/22  0315   WBC 7.9 6.0 8.2   HGB 9.5* 9.0* 9.5*   HCT 29.8* 27.9* 29.6*    205 218     Recent Labs     05/07/22  2115 05/08/22  0315 05/08/22  0915   * 131* 130*   K 4.2 4.3 4.5   CL 97* 96* 97*   CO2 25 26 23   BUN 14 13 12   CREATININE <0.5* <0.5* <0.5*   CALCIUM 8.2* 8.4 8.2*   PHOS 2.8 2.8 2.2*     No results for input(s): AST, ALT, BILIDIR, BILITOT, ALKPHOS in the last 72 hours.   Recent Labs     05/08/22  0915   INR 1.25*     No results for input(s): Ramin Gutierrez in the last 72 hours. Urinalysis:      Lab Results   Component Value Date    NITRU Negative 04/21/2022    WBCUA 21-50 04/21/2022    BACTERIA 4+ 04/21/2022    RBCUA  04/21/2022    BLOODU LARGE 04/21/2022    SPECGRAV >=1.030 04/21/2022    GLUCOSEU Negative 04/21/2022    GLUCOSEU >=1000 mg/dL 08/31/2010       Radiology:  XR CHEST PORTABLE   Final Result   Stable supportive devices. Increasing vascular congestion/mild pulmonary   edema. XR CHEST PORTABLE   Final Result   Low lung volumes with bibasilar atelectasis. Otherwise no acute process. XR CHEST PORTABLE   Final Result   Supportive tubing is in normal position. Stable left basilar opacity, atelectasis versus airspace disease. XR CHEST PORTABLE   Final Result   Slight improved aeration of the right lung. Bilateral pleuroparenchymal   disease remains         XR CHEST PORTABLE   Final Result   No significant change compared to chest x-ray from 05/01/2022. XR CHEST PORTABLE   Final Result   Low lung volumes with patchy airspace disease which may represent multifocal   atelectasis. Overall stable appearing chest.  Possible trace effusions. XR CHEST PORTABLE   Final Result   Relatively stable appearance of the chest with bibasilar airspace opacities. XR CHEST PORTABLE   Final Result   Low volume study with bilateral atelectasis or airspace disease, similar to   prior, with persistent small pleural effusions. XR CHEST PORTABLE   Final Result   Stable chest.         XR CHEST PORTABLE   Final Result   Endotracheal tube tip projects at the mid intrathoracic trachea. Otherwise   stable chest.         XR CHEST PORTABLE   Final Result   Suboptimal examination due to patient rotation. The chest appears stable. XR CHEST PORTABLE   Final Result   Endotracheal tube tip projects at the thoracic inlet.   Otherwise stable chest.         XR CHEST PORTABLE   Final Result   Stable chest.         XR CHEST PORTABLE   Final Result   Stable endotracheal tube located approximately 3.3 cm above the quintin. Low lung volumes. Suspected small right pleural effusion. XR CHEST PORTABLE   Final Result   Endotracheal tube in satisfactory position above the quintin      Bibasilar hypoaeration persist         CT ABDOMEN PELVIS W IV CONTRAST Additional Contrast? None   Final Result   1. Moderate amount of ascites with 3rd spacing including edematous changes   throughout the abdomen and anasarca. 2. Delgado in place. Diffuse bladder wall thickening. Correlate for   underlying cystitis. 3. No acute bowel pathology. Mild gastric distension. Diverticulosis with   no acute features. 4. Mild renal cortical scarring and asymmetric right renal atrophy, stable. No hydronephrosis. CT CHEST PULMONARY EMBOLISM W CONTRAST   Final Result   1. No evidence of acute pulmonary embolism. There is some thickening and   mild irregularity in the pulmonary arteries in the left lower lobe which may   represent chronic pulmonary embolism or secondary appearance to inflammation. No evidence of aortic aneurysm or dissection. 2. Moderate right effusion and right lower lobe atelectatic and/or   consolidative changes. Pneumonia is likely. There is severe loss of volume   in the right lung. Trace left effusion and left lower lobe atelectatic   changes are seen. 3. Moderate ascites around the spleen and liver. XR CHEST PORTABLE   Final Result   Low lung volumes. Bilateral pleural effusions with bibasilar volume loss,   right greater than left. No overt failure.          XR CHEST PORTABLE    (Results Pending)       Blood - 4/21 - Group A , strep pyogenes and Enterococcus Faecalis   Repeat blood - NG 4/23    Urine - Ecoli , Enterococcus Faecalis     Sputum- Acinetobacter baumannii    Assessment/Plan:    Septic shock.  Enterococcus faecalis and strep pyogenes bacteremia. Right lower extremity cellulitis  Chronic pressure ulcers on the back with exposed hardware      Source could be HD line vs exposed hardware  Patient was on vancomycin, Merrem and clindamycin.  changed to  fortaz , steffany nebs for acinetobacter,  Also on Vanc. ID managing this. Severe hypotensive shock  needing sammi, vaso, dobutamine and levophed    on hydrocortisone for shock-->weaned off   Critical care managing  Improving hypotension, improved wbc, now only on levo     End-stage renal disease on hemodialysis. Nephrology consulted  Initiated on CRRT   Fluid removal as tolerated , CRRT running neg      Acute hypoxic resp failure-COVID extubated. On Airvo. Healthcare associated pneumonia. Intubated in the ICU on 4/22. Antibiotics as above. S/p bronch 5/1/22 given worsening leucocytosis/fever  cx with acinetobacter   Weaned off vent now  IV ceftaz and steffany nebs   - he is agreeable with re intubation and tracheostomy if necessary       Chronic systolic congestive heart failure, EF 25 to 30%. Ischemic CM/CAD s/p previous stents   Cardiology consulted  Hold home medications given hypotension  Off dobutamine   Considering to resume BB ,   Resume ASA , statins       Acute metabolic encephalopathy resolved. Secondary to septic shock  resolved     Type 2 diabetes.   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 Meredith Patel     Paraplegia  Neurogenic bladder   - bed bound   - supportive care  - intermittent self catherizations , now has langley       Diet: Diet NPO  ADULT TUBE FEEDING; Orogastric; Renal Formula; Continuous; 35; Yes; 5; Q 4 hours; 40; 30; Q 4 hours; Protein; one proteinex P2Go TWICE daily via feeding tube  Code Status: Full Code    Dispo - cont care  updated family     Millie Madera MD 5/8/2022 10:47 AM
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
## 2022-05-09 NOTE — PROGRESS NOTES
05/09/22 0313   NIV Type   Mode Bilevel   Settings/Measurements   IPAP 16 cmH20   CPAP/EPAP 8 cmH2O   Rate Ordered 14   Resp 22   FiO2  35 %   Vt Exhaled 641 mL   Comfort Level Good   SpO2 100

## 2022-05-09 NOTE — PROGRESS NOTES
AM assessment completed. AM labs reviewed. VSS. Pt on levophed gtt at 5 mcg- titrating down. Pt NPO pending SLP. Midline PICC WNL. Vas Cath WNL. Delgado in place for strict I/o. CRRT running -50 cc fluid removal hourly. RT at bedside- pt currently on BiPap will switch to AirVo. No new orders at present time.    Gelacio Little RN, BSN

## 2022-05-09 NOTE — PROGRESS NOTES
Hospitalist Progress Note      PCP: Khloe Pacheco MD    Date of Admission: 4/21/2022    Subjective:     49 y.o. male with hx of ischemic CM, paraplegia, IDDM, ESRD on HD presents with bacteremia and decubitus ulcers, septic shock     Improving sepsis from last week, off sammi, vaso, dobutamine, now remains on levophed,      Ongoing CRRT with fluid removal   No fevers    Pt seen off vent this am, feels fine and reports weak cough  - having secretions that is requiring NT suctioning. He is on AirVO      5/8-he failed swallowing eval.  He used BiPAP last night. Mentation is normal.  On Airvo with FiO2 45%. 5/9  Failed swallow eval over the weekend  Has roule clearing secretions  AirVo currently  and bipap all night.   On CRRT  On Levo    Medications:  Reviewed    Infusion Medications    dextrose 5 % and 0.9 % NaCl 50 mL/hr at 05/08/22 1800    heparin (PORCINE) Infusion 1,050 Units/hr (05/08/22 1800)    vasopressin (Septic Shock) infusion Stopped (04/29/22 0008)    dextrose      prismaSATE BGK 4/2.5 500 mL/hr at 05/09/22 0434    prismaSATE BGK 4/2.5 500 mL/hr at 05/09/22 0434    prismaSATE BGK 4/2.5 500 mL/hr at 05/09/22 0434    norepinephrine 5 mcg/min (05/08/22 2100)    sodium chloride Stopped (05/02/22 1630)     Scheduled Medications    epoetin bebo-epbx  3,000 Units SubCUTAneous Once per day on Mon Wed Fri    ipratropium-albuterol  1 ampule Inhalation Q4H While awake    insulin glargine  25 Units SubCUTAneous Nightly    cefTAZidime (FORTAZ) IV  1,000 mg IntraVENous 3 times per day    tobramycin (PF)  300 mg Nebulization BID    sennosides-docusate sodium  2 tablet Oral BID    midodrine  10 mg Oral TID WC    insulin lispro  0-18 Units SubCUTAneous Q4H    pantoprazole  40 mg IntraVENous Daily    menthol-zinc oxide   Topical Daily    aspirin  81 mg Oral Nightly    sodium chloride flush  5-40 mL IntraVENous 2 times per day     PRN Meds: heparin (porcine), heparin (porcine), oxyCODONE-acetaminophen, traZODone, ipratropium-albuterol, dextrose bolus **OR** dextrose bolus, glucagon (rDNA), dextrose, potassium chloride, magnesium sulfate, calcium gluconate **OR** calcium gluconate **OR** calcium gluconate **OR** calcium gluconate, sodium phosphate IVPB **OR** sodium phosphate IVPB **OR** sodium phosphate IVPB **OR** sodium phosphate IVPB, glucose, midazolam, fentanNYL, sodium chloride flush, sodium chloride, ondansetron **OR** ondansetron, polyethylene glycol, acetaminophen **OR** acetaminophen, perflutren lipid microspheres      Intake/Output Summary (Last 24 hours) at 5/9/2022 0730  Last data filed at 5/9/2022 0700  Gross per 24 hour   Intake 1641.01 ml   Output 2690 ml   Net -1048.99 ml       Physical Exam Performed:    /78   Pulse 96   Temp 97 °F (36.1 °C) (Bladder)   Resp 20   Ht 6' 2\" (1.88 m)   Wt 243 lb (110.2 kg)   SpO2 100%   BMI 31.20 kg/m²       General appearance:  off vent, fully awake, alert and oriented   HEENT: NAD,  Neck: Supple, . No jugular venous distention. Trachea midline. Respiratory: Diminished breath sounds bilaterally  Cardiovascular: S 1 s2 heard,  Right chest HD catheter and Port noted  Abdomen: Soft, non-tender, +distended with normal bowel sounds. Colostomy noted in left LQ  Musculoskeletal: Right BKA with stump healthy.  Superficial skin ulcers on right thigh with no active infection noted  Left LE edema with superficial ulceration noted   Skin: see Epic wound pictures, chronic exposed hardware in lower thoracic and lumbar region   Neurologic: paraplegic , atrophy of forearm and hand muscles   Fully awake  alert oriented    Labs:   Recent Labs     05/07/22  0425 05/08/22  0315   WBC 6.0 8.2   HGB 9.0* 9.5*   HCT 27.9* 29.6*    218     Recent Labs     05/08/22  1515 05/08/22 2026 05/09/22  0255   * 133* 133*   K 4.4 4.3 4.3   CL 97* 98* 98*   CO2 23 26 25   BUN 11 11 10   CREATININE <0.5* 0.7* 0.6*   CALCIUM 8.9 8.4 8.5   PHOS 2.5 2.7 2.7     No results for input(s): AST, ALT, BILIDIR, BILITOT, ALKPHOS in the last 72 hours. Recent Labs     05/08/22  0915   INR 1.25*     No results for input(s): Noah Seed in the last 72 hours. Urinalysis:      Lab Results   Component Value Date    NITRU Negative 04/21/2022    WBCUA 21-50 04/21/2022    BACTERIA 4+ 04/21/2022    RBCUA  04/21/2022    BLOODU LARGE 04/21/2022    SPECGRAV >=1.030 04/21/2022    GLUCOSEU Negative 04/21/2022    GLUCOSEU >=1000 mg/dL 08/31/2010       Radiology:  IR MIDLINE CATH   Final Result      XR CHEST PORTABLE   Final Result   Stable supportive devices. Increasing vascular congestion/mild pulmonary   edema. XR CHEST PORTABLE   Final Result   Low lung volumes with bibasilar atelectasis. Otherwise no acute process. XR CHEST PORTABLE   Final Result   Supportive tubing is in normal position. Stable left basilar opacity, atelectasis versus airspace disease. XR CHEST PORTABLE   Final Result   Slight improved aeration of the right lung. Bilateral pleuroparenchymal   disease remains         XR CHEST PORTABLE   Final Result   No significant change compared to chest x-ray from 05/01/2022. XR CHEST PORTABLE   Final Result   Low lung volumes with patchy airspace disease which may represent multifocal   atelectasis. Overall stable appearing chest.  Possible trace effusions. XR CHEST PORTABLE   Final Result   Relatively stable appearance of the chest with bibasilar airspace opacities. XR CHEST PORTABLE   Final Result   Low volume study with bilateral atelectasis or airspace disease, similar to   prior, with persistent small pleural effusions. XR CHEST PORTABLE   Final Result   Stable chest.         XR CHEST PORTABLE   Final Result   Endotracheal tube tip projects at the mid intrathoracic trachea. Otherwise   stable chest.         XR CHEST PORTABLE   Final Result   Suboptimal examination due to patient rotation. pyogenes and Enterococcus Faecalis   Repeat blood - NG 4/23    Urine - Ecoli , Enterococcus Faecalis     Sputum- Acinetobacter baumannii    Assessment/Plan:    Septic shock. Enterococcus faecalis and strep pyogenes bacteremia. Right lower extremity cellulitis  Chronic pressure ulcers on the back with exposed hardware      Source could be HD line vs exposed hardware  Patient was on vancomycin, Merrem and clindamycin.  changed to  fortaz , steffany nebs for acinetobacter. Completed 14 days of IV Vanco. ID managing this. Severe hypotensive shock  needing sammi, vaso, dobutamine and levophed    on hydrocortisone for shock-->weaned off   Critical care managing  Improving hypotension, improved wbc, now only on levo     End-stage renal disease on hemodialysis. Nephrology consulted  Initiated on CRRT   Fluid removal as tolerated , CRRT running neg      Acute hypoxic resp failure-COVID extubated. On Airvo. Healthcare associated pneumonia. Intubated in the ICU on 4/22. Antibiotics as above. S/p bronch 5/1/22 given worsening leucocytosis/fever  cx with acinetobacter   Weaned off vent now  IV ceftaz and steffany nebs   - he is agreeable with re intubation and tracheostomy if necessary. Currently on air Vo. Used BiPAP overnight.       Chronic systolic congestive heart failure, EF 25 to 30%. Ischemic CM/CAD s/p previous stents   Cardiology consulted  Hold home medications given hypotension  Off dobutamine   Considering to resume BB ,   Resume ASA , statins       Acute metabolic encephalopathy resolved. Secondary to septic shock  resolved     Type 2 diabetes.   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 Madai Worrell     Paraplegia  Neurogenic bladder   - bed bound   - supportive care  - intermittent self catherizations , now has langley       Diet: Diet NPO  ADULT TUBE FEEDING; Orogastric; Renal Formula; Continuous; 35; Yes; 5; Q 4 hours; 40; 30; Q 4 hours; Protein; one proteinex P2Go TWICE daily via feeding tube  Code Status: Full Code    Dispo - cont care  updated family     Ulises Cadet MD 5/9/2022 7:30 AM

## 2022-05-09 NOTE — PROGRESS NOTES
5/9  aPTT = 61.5 sec at 0530. Continue heparin gtt at 1050 units/hr. Check aPTT daily starting 5/10.   Edi Ring PharmD  5/9/2022 7:43 AM
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
## 2022-05-09 NOTE — PROGRESS NOTES
CRRT filter changed due to 72 hour time frame completed. Restarted, pt tolerated, no concerns noted.

## 2022-05-09 NOTE — PROGRESS NOTES
High risk vesicant drug infusing:  _____x_____    Multiple incompatible medications infusing:  _________    CVP Monitoring:  _________    Extremely difficult IV access challenge:  ___x_____    Continued need for central line access:  ____x______    Addressed with physician:  ____x____    RIGHT PATIENT, RIGHT TIME, RIGHT LINE

## 2022-05-09 NOTE — PROGRESS NOTES
5/9  aPTT = 67.9 sec at 2328. Continue heparin gtt at 1050 units/hr. Recheck aPTT in 6 hours.   Jessee Kern, PharmD  5/9/2022 12:41 AM
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
## 2022-05-09 NOTE — PROGRESS NOTES
Shift assessment completed as documented on flowsheet. Pt awake and alert, currently wearing bipap. VSS CRRT running without concerns. Levophed titrated for effect. Delgado patent to bsd. Stoma pink moist, with liquid stool. Lungs diminished with rhonchi. Follows commands. Monitoring closely.

## 2022-05-09 NOTE — PROGRESS NOTES
RT Inhaler-Nebulizer Bronchodilator Protocol Note    There is a bronchodilator order in the chart from a provider indicating to follow the RT Bronchodilator Protocol and there is an Initiate RT Inhaler-Nebulizer Bronchodilator Protocol order as well (see protocol at bottom of note). CXR Findings:  No results found. The findings from the last RT Protocol Assessment were as follows:   History Pulmonary Disease: Chronic pulmonary disease  Respiratory Pattern: Dyspnea on exertion or RR 21-25 bpm  Breath Sounds: Slightly diminished and/or crackles  Cough: Weak, productive  Indication for Bronchodilator Therapy: On home bronchodilators  Bronchodilator Assessment Score: 8    Aerosolized bronchodilator medication orders have been revised according to the RT Inhaler-Nebulizer Bronchodilator Protocol below. Respiratory Therapist to perform RT Therapy Protocol Assessment initially then follow the protocol. Repeat RT Therapy Protocol Assessment PRN for score 0-3 or on second treatment, BID, and PRN for scores above 3. No Indications - adjust the frequency to every 6 hours PRN wheezing or bronchospasm, if no treatments needed after 48 hours then discontinue using Per Protocol order mode. If indication present, adjust the RT bronchodilator orders based on the Bronchodilator Assessment Score as indicated below. Use Inhaler orders unless patient has one or more of the following: on home nebulizer, not able to hold breath for 10 seconds, is not alert and oriented, cannot activate and use MDI correctly, or respiratory rate 25 breaths per minute or more, then use the equivalent nebulizer order(s) with same Frequency and PRN reasons based on the score. If a patient is on this medication at home then do not decrease Frequency below that used at home.     0-3 - enter or revise RT bronchodilator order(s) to equivalent RT Bronchodilator order with Frequency of every 4 hours PRN for wheezing or increased work of breathing using Per Protocol order mode. 4-6 - enter or revise RT Bronchodilator order(s) to two equivalent RT bronchodilator orders with one order with BID Frequency and one order with Frequency of every 4 hours PRN wheezing or increased work of breathing using Per Protocol order mode. 7-10 - enter or revise RT Bronchodilator order(s) to two equivalent RT bronchodilator orders with one order with TID Frequency and one order with Frequency of every 4 hours PRN wheezing or increased work of breathing using Per Protocol order mode. 11-13 - enter or revise RT Bronchodilator order(s) to one equivalent RT bronchodilator order with QID Frequency and an Albuterol order with Frequency of every 4 hours PRN wheezing or increased work of breathing using Per Protocol order mode. Greater than 13 - enter or revise RT Bronchodilator order(s) to one equivalent RT bronchodilator order with every 4 hours Frequency and an Albuterol order with Frequency of every 2 hours PRN wheezing or increased work of breathing using Per Protocol order mode. RT to enter RT Home Evaluation for COPD & MDI Assessment order using Per Protocol order mode.     Electronically signed by Verner Nian, RCP on 5/9/2022 at 8:22 AM

## 2022-05-09 NOTE — PROGRESS NOTES
Speech Language Pathology  Facility/Department: [facility] ICU  Dysphagia Daily Treatment Note    Cannot safely recommend PO at this time. Recommend instrumental assessment with FEES (pt unable to be transported to radiology for [facility]). This is available tomorrow 5/10/22. Please contact SLP if this is not appropriate tomorrow from a respiratory standpoint.           [phone] or desk phone [phone]  Rec single ice chips PRN when pt is alert and upright, to allow for pharyngeal moisture and combat disuse atrophy     NAME: Vandana Rowley  : 1965  MRN: [id number]    Patient Diagnosis(es):   Patient Active Problem List    Diagnosis Date Noted    Atelectasis of right lung     Mucus plugging of bronchi     Bacteremia 2022    Cardiogenic shock ([address]     Pneumonia due to Acinetobacter species ([address]     Septic shock ([address] 2022    ESRD on dialysis ([address] 2022    Anasarca associated with disorder of kidney     Pressure ulcer of left leg, stage 4 ([address] 2021    S/P BKA (below knee amputation) unilateral, right ([address] 10/29/2021    Paroxysmal atrial fibrillation ([address] 10/29/2021    Acute on chronic combined systolic and diastolic CHF (congestive heart failure) ([address] 2021    Hyponatremia     Bacteremia due to Streptococcus pyogenes ([address]     Choledocholithiasis     Moderate persistent asthma without complication     Hyperkalemia 2021    Bullous cellulitis of right thigh 10/29/2019    LIBORIO on CPAP     Gitelman syndrome 2018    Ischemic cardiomyopathy 2017    Onychomycosis 07/10/2017    Dystrophic nail 07/10/2017    Dehiscence of surgical wound of T-spine, initial encounter 2017    Iron deficiency anemia due to chronic blood loss 2015    Lymphedema of both lower extremities 2015    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) 03/10/2014    Neurogenic bladder 10/10/2013    Neurogenic bowel 10/10/2013    Mixed hyperlipidemia 02/22/2010    Coronary artery disease involving native coronary artery of native heart without angina pectoris 02/22/2010     Allergies: Allergies   Allergen Reactions    Benadryl [Diphenhydramine Hcl] Anaphylaxis     Throat swelling    Keflex [Cephalexin] Anaphylaxis     Tolerates cefdinir and ceftriaxone.  Statins      muscle aches     Morphine Anxiety     Hallucinations     Penicillins Rash     Hives     Sulfa Antibiotics Rash     Subjective:  Alert, recognizes SLP from recent assessment. Describes having \"a full voice\" prior to prolonged intubation. Pain: \"very little\" sore throat    Current Diet: Diet NPO    Diet Tolerance:  NPO    P.O. Trials: Thin   X Ice chips X2 Tsp water X1. Reactive-appearing severely weak cough, upper airway congestion post swallow   Nectar / Mildly Thick       Honey / Moderately Thick       Pudding / Extremely Thick       Puree       Solid         Dysphagia Treatment and Impressions:  * pt intubated 4/22, extubated 5/6. This is day 3 without nutritional support other than IV fluid. Wore BiPap overnight, now on 30L, 60% Fi02 with Sp02 100%, RR mid 20's. CRRT in use. He is fully alert. * Chronic paraplegia and weak cough    * Pt agreeable to ice chip trials and a single bolus of water. Adequate oral control and propulsion. Swallow is palpable with 2-3 elicited per ice chip. Post-swallow audible chest congestion and severely weak cough with water. No change in aphonia post swallow. * Further PO trials deferred with concern for aspiration risk related to laryngeal impairment. Cannot safely recommend PO at this time. Recommend instrumental assessment with FEES (pt unable to be transported to radiology for [facility]). This is available tomorrow 5/10/22.      Dysphagia Goals:    Goals:  Short Term Goals:  Timeframe for Short Term Goals: (5 days 5/12/22 date)  Goal 1: The patient will tolerate repeat BSE when able for ongoing assessment 5/9: addressed, not progressing    Goal Added 2: Pt will participate in instrumental assessment     Long Term Goals:   Timeframe for Long Term Goals: (7 days 5/14/22 date)  Goal 1: The patient will tolerate least restrictive diet with no clinical s/s of aspiration or worsening respiratory/pulmonary status    Recommendations:  Solid Consistency: NPO  Liquid Consistency: NPO, single ice chips sparingly for oropharyngeal comfort  Medication: non-oral    Patient/Family/Caregiver Education: Rationale for FEES, rationale for continued NPO     Plan:    Continued Dysphagia treatment with goals per plan of care. Complete FEES 5/10    Discharge Recommendations: pending reassessment  If pt discharges from hospital prior to Speech/Swallowing discharge, this note serves as tx and discharge summary. Total Treatment Time / Charges     Time in Time out Total Time / units   Cognitive Tx         Speech Tx      Dysphagia Tx 1015 1034 19 min/ 1 unit     Signature: Marika Malloy MS, CCC-SLP  Speech Language Pathologist       Chase Gonzales SLP

## 2022-05-09 NOTE — PROGRESS NOTES
Pt remains on CRRT -50 cc/hr. FEES ordered for tomorrow per recommendation of SLP Marie. Pts sister & mother remain at bedside. Pt on airvo- no needs expressed. Assessment unchanged.    Emily Evans RN, BSN

## 2022-05-09 NOTE — CARE COORDINATION
INTERDISCIPLINARY PLAN OF CARE CONFERENCE    Date/Time: 5/9/2022 3:20 PM  Completed by: Roman Morales RN, Case Management      Patient Name:  Venus Chavez  YOB: 1965  Admitting Diagnosis: Hyperkalemia [E87.5]  Hypoglycemia [E16.2]  ESRD on dialysis ([address] [N18.6, Z99.2]  Acute cystitis with hematuria [N30.01]  Septic shock ([address] [A41.9, R65.21]  Sepsis ([address] [A41.9]  Pneumonia due to infectious organism, unspecified laterality, unspecified part of lung [J18.9]     Admit Date/Time:  4/21/2022  1:21 PM    Chart reviewed. Interdisciplinary team contacted or reviewed plan related to patient progress and discharge plans. Disciplines included Case Management, Nursing, and Dietitian. Current Status:INPT  PT/OT recommendation for discharge plan of care: TBD    Expected D/C Disposition:  Home     Discharge Plan Comments: CM reviewed chart. Pt cont in ICU on levophed, CRRT and Airvo. Pt and family cont to plan for pt to return home at discharge. Pt is active with [facility] and [facility] in [place] for HD. CM following. Home O2 in place on admit: Yes  Pt informed of need to bring portable home O2 tank on day of discharge for nursing to connect prior to leaving:  Yes  Verbalized agreement/Understanding:   Yes eyeglasses
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
## 2022-05-09 NOTE — PROGRESS NOTES
05/08/22 5409   NIV Type   Mode Bilevel   Settings/Measurements   IPAP 16 cmH20   CPAP/EPAP 8 cmH2O   Rate Ordered 14   Resp 25   FiO2  35 %   Vt Exhaled 553 mL   Comfort Level Good   SpO2 100
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
## 2022-05-10 NOTE — PROGRESS NOTES
05/09/22 2346   NIV Type   NIV Started/Stopped On   Equipment Type V60   Mode Bilevel   Mask Type   (bonnet)   Bonnet size   (c)   Settings/Measurements   PIP Observed 17 cm H20   IPAP 16 cmH20   CPAP/EPAP 8 cmH2O   Rate Ordered 14   Resp 18   Insp Rise Time (%) 2 %   FiO2  40 %   I Time/ I Time % 0.9 s   Vt Exhaled 451 mL   Minute Volume 16.4 Liters   Mask Leak (lpm) 0 lpm   Comfort Level Good   Using Accessory Muscles No   SpO2 100   Patient's Home Machine No   Alarm Settings   Alarms On Y   Low Pressure 8 cmH2O   High Pressure  40 cmH2O   Delay Alarm 20 sec(s)   RR Low  14   RR High 40 br/min   Oxygen Therapy/Pulse Ox   O2 Therapy Oxygen   O2 Device PAP (positive airway pressure)   SpO2 100 %

## 2022-05-10 NOTE — PROGRESS NOTES
05/10/22 1914   NIV Type   NIV Started/Stopped On   Equipment Type v60   Mode Bilevel   Mask Type Other (Comment)   Settings/Measurements   IPAP 16 cmH20   CPAP/EPAP 8 cmH2O   Rate Ordered 14   Resp 29   FiO2  50 %   I Time/ I Time % 0.9 s   Vt Exhaled 456 mL   Minute Volume 17 Liters   Mask Leak (lpm) 2 lpm   Comfort Level Good   Using Accessory Muscles No   SpO2 95   Alarm Settings   Alarms On Y   Low Pressure 8 cmH2O   High Pressure  40 cmH2O   Delay Alarm 20 sec(s)   RR Low  14   RR High 40 br/min   Oxygen Therapy/Pulse Ox   O2 Therapy Oxygen   O2 Device PAP (positive airway pressure)   SpO2 97 %

## 2022-05-10 NOTE — PROGRESS NOTES
Comprehensive Nutrition Assessment    Type and Reason for Visit:  Reassess    Nutrition Recommendations/Plan:   1. Continue ADULT DIET; Dysphagia - Pureed diet order - consistency changes, per SLP. 2. Added vanilla Ensure high-protein with meals. 3. Monitor appetite, meal intake, ONS intake, and SLP progress notes. 4. Monitor nutrition-related labs, ostomy output/function, and weight trends. Malnutrition Assessment:  Malnutrition Status: At risk for malnutrition (05/10/22 1507)    Context:  Acute Illness     Findings of the 6 clinical characteristics of malnutrition:  Energy Intake:  Mild decrease in energy intake (no po intake and TF was stopped x 1-2 days)  Weight Loss:  Greater than 5% over 1 month (- 45# or 15.9% weight loss since 4/22/22)     Body Fat Loss:  No significant body fat loss Orbital   Muscle Mass Loss:  Unable to assess Temples (temporalis)  Fluid Accumulation:  No significant fluid accumulation Extremities (BLE + 2 pitting edema)   Strength:  Not Performed    Nutrition Assessment:    patient remains unchanged from a nutritional standpoint since last RD assessment except that patient's diet has been advanced since FEES procedure was completed; patient remains at risk for further compromise d/t no po intake documented on current diet order, altered nutrition-related labs, ongoing respiratory dysfunction with need for bipap overnight and most of yesterday, and renal dysfunction with needs for CRRT; will continue ADULT DIET; Dysphagia - Pureed diet order and add Ensure high-protein with meals (vanilla)    Nutrition Related Findings:    patient is A & O x 4; patient used bipap most of yesterday and overnight last night; FEES was completed and patient's diet was advanced to ADULT DIET;  Dysphagia - Pureed; no po intake data documented in flow sheets yet; abdomen is round, distended, and bowel sounds are absent/hypoactive; + ostomy output; patient continues with CRRT and - 50 ml/hr fluid removal Wound Type: Multiple,Pressure Injury,Surgical Incision,Unstageable,Full Thickness,Partial Thickness (full thickness, trauma - pre-tib; unstageable left lower leg; full thickness, surgical - mid back; partial thickness skin loss - sacrum/buttocks)       Current Nutrition Intake & Therapies:    Average Meal Intake: Unable to assess (diet advanced but no po intake documented thus far)  Average Supplements Intake: None Ordered  ADULT DIET; Dysphagia - Pureed  ADULT ORAL NUTRITION SUPPLEMENT; Breakfast, Lunch, Dinner; Low Calorie/High Protein Oral Supplement    Anthropometric Measures:  Height: 6' 2\" (188 cm)  Ideal Body Weight (IBW): 190 lbs (86 kg)    Admission Body Weight: 288 lb 12.8 oz (131 kg) (obtained on 4/22/22; actual weight)  Current Body Weight: 243 lb (110.2 kg) (obtained on 5/9/22; actual weight), 127.9 % IBW. Weight Source: Bed Scale  Current BMI (kg/m2): 31.2  Usual Body Weight: 288 lb 12.8 oz (131 kg) (obtained on 4/22/22; actual weight)  % Weight Change (Calculated): -15.9  Weight Adjustment For: Paraplegia,Amputation  Total Adjusted Percentage (Calculated): 13.4  Adjusted Ideal Body Weight (lbs) (Calculated): 164.5 lbs  Adjusted Ideal Body Weight (kg) (Calculated): 74.77 kg  Adjusted % Ideal Body Weight (Calculated): 147.7  Adjusted BMI (kg/m2) (Calculated): 35.4  BMI Categories: Obese Class 1 (BMI 30.0-34. 9)    Estimated Daily Nutrient Needs:  Energy Requirements Based On: Kcal/kg  Weight Used for Energy Requirements: Current  Energy (kcal/day): 1254 - 1596 kcals based on 11-14 kcals/kg/CBW  Weight Used for Protein Requirements: Adjusted (adjusted IBW)  Protein (g/day): 135 - 150 kcals based on 1.8-2.0 g/kg/adjusted IBW (+ CRRT)  Method Used for Fluid Requirements: 1 ml/kcal  Fluid (ml/day): 1254 - 1596 ml    Nutrition Diagnosis:   · Inadequate oral intake related to inadequate protein-energy intake,impaired respiratory function,renal dysfunction,increase demand for energy/nutrients as

## 2022-05-10 NOTE — PROGRESS NOTES
05/09/22 2101   NIV Type   NIV Started/Stopped On   Equipment Type v60   Mode Bilevel   Mask Type Full face mask   Settings/Measurements   IPAP 16 cmH20   CPAP/EPAP 8 cmH2O   Rate Ordered 14   Resp 25   FiO2  40 %   I Time/ I Time % 0.9 s   Vt Exhaled 459 mL   Minute Volume 17.3 Liters   Mask Leak (lpm) 26 lpm   Comfort Level Good   Using Accessory Muscles No   SpO2 96   Alarm Settings   Alarms On Y   Low Pressure 5 cmH2O   High Pressure  40 cmH2O   Delay Alarm 20 sec(s)   RR Low  14   RR High 40 br/min

## 2022-05-10 NOTE — PROGRESS NOTES
05/10/22 0321   NIV Type   NIV Started/Stopped On   Equipment Type V60   Mode Bilevel   Settings/Measurements   IPAP 16 cmH20   CPAP/EPAP 8 cmH2O   Rate Ordered 14   Resp 17   Insp Rise Time (%) 2 %   FiO2  40 %   I Time/ I Time % 0.9 s   Vt Exhaled 441 mL   Minute Volume 14.8 Liters   Mask Leak (lpm) 1 lpm   Comfort Level Good   Using Accessory Muscles No   SpO2 100   Patient's Home Machine No   Oxygen Therapy/Pulse Ox   O2 Therapy Oxygen   O2 Device PAP (positive airway pressure)   SpO2 100 %

## 2022-05-10 NOTE — PROGRESS NOTES
Shift assessment completed as documented on flowsheet. VSS. Pt resting with eyes closed, opens eyes to verbal stimuli. Levophed infusing, titrate for effect. IV infusing per MAR. Abd round soft and nontender. Colostomy patent. Delgado patent to bsd. No concerns at this time. Call light within reach.

## 2022-05-10 NOTE — PROGRESS NOTES
Spoke w/ Dr. Jennifer Diaz- increase BiPap settings to 20/8 & insert nasal trumpet- abg in 2 hours.  Called RT Barbara.

## 2022-05-10 NOTE — PROGRESS NOTES
5/10  aPTT = 63.8 sec at 0554. Continue heparin gtt at 1050 units/hr. Continue aPTT daily.   Stacia Dahl PharmD  5/10/2022 7:43 AM

## 2022-05-10 NOTE — PROGRESS NOTES
Speech Language Pathology  FEES Brief    Name: Fabrizio Cortes  : 1965  Medical Diagnosis: Hyperkalemia [E87.5]  Hypoglycemia [E16.2]  ESRD on dialysis ([facility] [address] [N18.6, Z99.2]  Acute cystitis with hematuria [N30.01]  [facility] [A41.9, R65.21]  Sepsis ([facility] [A41.9]  Pneumonia due to infectious organism, unspecified laterality, unspecified part of lung [J18.9]      FEES completed at this time. Preliminary recommendations include puree solids and thin liquids. Straws ok. Meds crushed in puree as able. Formal report and final rec's to follow review of imaging study. Call SLP with any questions or concerns.  Thank you,    Ghassan Lutz M.A., [address] [facility]  Speech-Language Pathologist  Phone: [phone]

## 2022-05-10 NOTE — PROGRESS NOTES
Reassessment completed as documented on flowsheet. VSS no changes at this time. CRRT continues removing 50 ml/hr.  Pt tolerated well

## 2022-05-10 NOTE — PROGRESS NOTES
High risk vesicant drug infusing:  __________    Multiple incompatible medications infusing:  _________    CVP Monitoring:  _________    Extremely difficult IV access challenge:  ____x____    Continued need for central line access:  ____x______    Addressed with physician:  _x_______    RIGHT PATIENT, RIGHT TIME, RIGHT LINE

## 2022-05-10 NOTE — PROGRESS NOTES
Hospitalist Progress Note      PCP: Sarika Canales MD    Date of Admission: 4/21/2022    Subjective:     49 y.o. male with hx of ischemic CM, paraplegia, IDDM, ESRD on HD presents with bacteremia and decubitus ulcers, septic shock     Improving sepsis from last week, off sammi, vaso, dobutamine, now remains on levophed,      Ongoing CRRT with fluid removal   No fevers    Pt seen off vent this am, feels fine and reports weak cough  - having secretions that is requiring NT suctioning. He is on AirVO      5/8-he failed swallowing eval.  He used BiPAP last night. Mentation is normal.  On Airvo with FiO2 45%. 5/9  Failed swallow eval over the weekend  Has trouble clearing secretions  AirVo currently  and bipap all night. On CRRT  On Levo    5/10  On BiPAP for most of yesterday and all night. Currently on Airvo.   CRRT running negative  On Levophed    Medications:  Reviewed    Infusion Medications    dextrose 5 % and 0.9 % NaCl 50 mL/hr at 05/09/22 1706    heparin (PORCINE) Infusion 1,050 Units/hr (05/10/22 1106)    dextrose      prismaSATE BGK 4/2.5 500 mL/hr at 05/10/22 1016    prismaSATE BGK 4/2.5 500 mL/hr at 05/10/22 1016    prismaSATE BGK 4/2.5 500 mL/hr at 05/10/22 1016    norepinephrine 6 mcg/min (05/10/22 1202)    sodium chloride Stopped (05/02/22 1630)     Scheduled Medications    epoetin bebo-epbx  3,000 Units SubCUTAneous Once per day on Mon Wed Fri    ipratropium-albuterol  1 ampule Inhalation Q4H While awake    insulin glargine  25 Units SubCUTAneous Nightly    sennosides-docusate sodium  2 tablet Oral BID    midodrine  10 mg Oral TID WC    insulin lispro  0-18 Units SubCUTAneous Q4H    pantoprazole  40 mg IntraVENous Daily    menthol-zinc oxide   Topical Daily    aspirin  81 mg Oral Nightly    sodium chloride flush  5-40 mL IntraVENous 2 times per day     PRN Meds: HYDROmorphone, heparin (porcine), heparin (porcine), oxyCODONE-acetaminophen, traZODone, ipratropium-albuterol, dextrose bolus **OR** dextrose bolus, glucagon (rDNA), dextrose, potassium chloride, magnesium sulfate, calcium gluconate **OR** calcium gluconate **OR** calcium gluconate **OR** calcium gluconate, sodium phosphate IVPB **OR** sodium phosphate IVPB **OR** sodium phosphate IVPB **OR** sodium phosphate IVPB, glucose, midazolam, fentanNYL, sodium chloride flush, sodium chloride, ondansetron **OR** ondansetron, polyethylene glycol, acetaminophen **OR** acetaminophen, perflutren lipid microspheres      Intake/Output Summary (Last 24 hours) at 5/10/2022 1225  Last data filed at 5/10/2022 1200  Gross per 24 hour   Intake 1972 ml   Output 3264 ml   Net -1292 ml       Physical Exam Performed:    BP (!) 101/58   Pulse 102   Temp 97.4 °F (36.3 °C) (Bladder)   Resp (!) 31   Ht 6' 2\" (1.88 m)   Wt 243 lb (110.2 kg)   SpO2 96%   BMI 31.20 kg/m²       General appearance:  off vent, fully awake, alert and oriented   HEENT: NAD,  Neck: Supple, . No jugular venous distention. Trachea midline. Respiratory: Diminished breath sounds bilaterally  Cardiovascular: S 1 s2 heard,  Right chest HD catheter and Port noted  Abdomen: Soft, non-tender, +distended with normal bowel sounds. Colostomy noted in left LQ  Musculoskeletal: Right BKA with stump healthy.  Superficial skin ulcers on right thigh with no active infection noted  Left LE edema with superficial ulceration noted   Skin: see Epic wound pictures, chronic exposed hardware in lower thoracic and lumbar region   Neurologic: paraplegic , atrophy of forearm and hand muscles   Fully awake  alert oriented    Labs:   Recent Labs     05/08/22  0315 05/09/22  1500 05/10/22  0901   WBC 8.2 6.8 5.7   HGB 9.5* 9.8* 10.3*   HCT 29.6* 30.9* 32.2*    203 152     Recent Labs     05/09/22  2110 05/10/22  0250 05/10/22  0901   * 133* 133*   K 4.7 4.2 4.5   CL 98* 97* 99   CO2 25 26 24   BUN 8 8 7   CREATININE 0.6* 0.6* <0.5*   CALCIUM 8.4 8.4 8.7   PHOS 3.0 2.8 2.3*     No results for input(s): AST, ALT, BILIDIR, BILITOT, ALKPHOS in the last 72 hours. Recent Labs     05/08/22  0915   INR 1.25*     No results for input(s): Olegario Altes in the last 72 hours. Urinalysis:      Lab Results   Component Value Date    NITRU Negative 04/21/2022    WBCUA 21-50 04/21/2022    BACTERIA 4+ 04/21/2022    RBCUA  04/21/2022    BLOODU LARGE 04/21/2022    SPECGRAV >=1.030 04/21/2022    GLUCOSEU Negative 04/21/2022    GLUCOSEU >=1000 mg/dL 08/31/2010       Radiology:  XR CHEST PORTABLE   Final Result   Hypoinflated lungs with residual basilar opacities likely representing   atelectasis. The technique and hypoinflation will exaggerate the pulmonary vasculature. IR MIDLINE CATH   Final Result      XR CHEST PORTABLE   Final Result   Stable supportive devices. Increasing vascular congestion/mild pulmonary   edema. XR CHEST PORTABLE   Final Result   Low lung volumes with bibasilar atelectasis. Otherwise no acute process. XR CHEST PORTABLE   Final Result   Supportive tubing is in normal position. Stable left basilar opacity, atelectasis versus airspace disease. XR CHEST PORTABLE   Final Result   Slight improved aeration of the right lung. Bilateral pleuroparenchymal   disease remains         XR CHEST PORTABLE   Final Result   No significant change compared to chest x-ray from 05/01/2022. XR CHEST PORTABLE   Final Result   Low lung volumes with patchy airspace disease which may represent multifocal   atelectasis. Overall stable appearing chest.  Possible trace effusions. XR CHEST PORTABLE   Final Result   Relatively stable appearance of the chest with bibasilar airspace opacities. XR CHEST PORTABLE   Final Result   Low volume study with bilateral atelectasis or airspace disease, similar to   prior, with persistent small pleural effusions.          XR CHEST PORTABLE   Final Result   Stable chest.         XR CHEST PORTABLE   Final Result Endotracheal tube tip projects at the mid intrathoracic trachea. Otherwise   stable chest.         XR CHEST PORTABLE   Final Result   Suboptimal examination due to patient rotation. The chest appears stable. XR CHEST PORTABLE   Final Result   Endotracheal tube tip projects at the thoracic inlet. Otherwise stable chest.         XR CHEST PORTABLE   Final Result   Stable chest.         XR CHEST PORTABLE   Final Result   Stable endotracheal tube located approximately 3.3 cm above the quintin. Low lung volumes. Suspected small right pleural effusion. XR CHEST PORTABLE   Final Result   Endotracheal tube in satisfactory position above the quintin      Bibasilar hypoaeration persist         CT ABDOMEN PELVIS W IV CONTRAST Additional Contrast? None   Final Result   1. Moderate amount of ascites with 3rd spacing including edematous changes   throughout the abdomen and anasarca. 2. Delgado in place. Diffuse bladder wall thickening. Correlate for   underlying cystitis. 3. No acute bowel pathology. Mild gastric distension. Diverticulosis with   no acute features. 4. Mild renal cortical scarring and asymmetric right renal atrophy, stable. No hydronephrosis. CT CHEST PULMONARY EMBOLISM W CONTRAST   Final Result   1. No evidence of acute pulmonary embolism. There is some thickening and   mild irregularity in the pulmonary arteries in the left lower lobe which may   represent chronic pulmonary embolism or secondary appearance to inflammation. No evidence of aortic aneurysm or dissection. 2. Moderate right effusion and right lower lobe atelectatic and/or   consolidative changes. Pneumonia is likely. There is severe loss of volume   in the right lung. Trace left effusion and left lower lobe atelectatic   changes are seen. 3. Moderate ascites around the spleen and liver. XR CHEST PORTABLE   Final Result   Low lung volumes.   Bilateral pleural effusions with bibasilar volume loss,   right greater than left. No overt failure. XR CHEST PORTABLE    (Results Pending)       Blood - 4/21 - Group A , strep pyogenes and Enterococcus Faecalis   Repeat blood - NG 4/23    Urine - Ecoli , Enterococcus Faecalis     Sputum- Acinetobacter baumannii    Assessment/Plan:    Septic shock. Enterococcus faecalis and strep pyogenes bacteremia. Right lower extremity cellulitis  Chronic pressure ulcers on the back with exposed hardware      Source could be HD line vs exposed hardware  Patient was on vancomycin, Merrem and clindamycin.  changed to  fortaz , steffany nebs for acinetobacter. Completed 14 days of IV Vanco. ID managing this. Severe hypotensive shock  needing sammi, vaso, dobutamine and levophed    on hydrocortisone for shock-->weaned off   Critical care managing  Improving hypotension, improved wbc, now only on levo     End-stage renal disease on hemodialysis. Nephrology consulted  Initiated on CRRT   Fluid removal as tolerated , CRRT running neg      Acute hypoxic resp failure-COVID extubated. On Airvo. Healthcare associated pneumonia. Intubated in the ICU on 4/22. Antibiotics as above. S/p bronch 5/1/22 given worsening leucocytosis/fever  cx with acinetobacter   Weaned off vent now  IV ceftaz and steffany nebs   - he is agreeable with re intubation and tracheostomy if necessary. Currently on air Vo. Used BiPAP most of yesterday and all last night.       Chronic systolic congestive heart failure, EF 25 to 30%. Ischemic CM/CAD s/p previous stents   Cardiology consulted  Hold home medications given hypotension  Off dobutamine   Considering to resume BB ,   Resume ASA , statins     Acute metabolic encephalopathy resolved. Secondary to septic shock  resolved     Type 2 diabetes.   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 Mills Current     Paraplegia  Neurogenic bladder
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
## 2022-05-10 NOTE — PROGRESS NOTES
AM assessment completed. AM labs reviewed. VSS. Pt on levophed gtt at 6 mcg- titrating down. Pt NPO pending SLP. Midline PICC WNL. Vas Cath WNL. Delgado in place for strict I/o. CRRT running -50 cc fluid removal hourly. RT at bedside- pt currently on BiPap will switch to AirVo. No new orders at present time.    Emily Evans RN, BSN

## 2022-05-10 NOTE — PROGRESS NOTES
RT Inhaler-Nebulizer Bronchodilator Protocol Note    There is a bronchodilator order in the chart from a provider indicating to follow the RT Bronchodilator Protocol and there is an Initiate RT Inhaler-Nebulizer Bronchodilator Protocol order as well (see protocol at bottom of note). CXR Findings:  No results found. The findings from the last RT Protocol Assessment were as follows:   History Pulmonary Disease: Chronic pulmonary disease  Respiratory Pattern: Dyspnea on exertion or RR 21-25 bpm  Breath Sounds: Slightly diminished and/or crackles  Cough: Weak, productive  Indication for Bronchodilator Therapy: On home bronchodilators  Bronchodilator Assessment Score: 8    Aerosolized bronchodilator medication orders have been revised according to the RT Inhaler-Nebulizer Bronchodilator Protocol below. Respiratory Therapist to perform RT Therapy Protocol Assessment initially then follow the protocol. Repeat RT Therapy Protocol Assessment PRN for score 0-3 or on second treatment, BID, and PRN for scores above 3. No Indications - adjust the frequency to every 6 hours PRN wheezing or bronchospasm, if no treatments needed after 48 hours then discontinue using Per Protocol order mode. If indication present, adjust the RT bronchodilator orders based on the Bronchodilator Assessment Score as indicated below. Use Inhaler orders unless patient has one or more of the following: on home nebulizer, not able to hold breath for 10 seconds, is not alert and oriented, cannot activate and use MDI correctly, or respiratory rate 25 breaths per minute or more, then use the equivalent nebulizer order(s) with same Frequency and PRN reasons based on the score. If a patient is on this medication at home then do not decrease Frequency below that used at home.     0-3 - enter or revise RT bronchodilator order(s) to equivalent RT Bronchodilator order with Frequency of every 4 hours PRN for wheezing or increased work of

## 2022-05-10 NOTE — PROGRESS NOTES
RT Inhaler-Nebulizer Bronchodilator Protocol Note    There is a bronchodilator order in the chart from a provider indicating to follow the RT Bronchodilator Protocol and there is an Initiate RT Inhaler-Nebulizer Bronchodilator Protocol order as well (see protocol at bottom of note). CXR Findings:  XR CHEST PORTABLE    Result Date: 5/10/2022  Hypoinflated lungs with residual basilar opacities likely representing atelectasis. The technique and hypoinflation will exaggerate the pulmonary vasculature. The findings from the last RT Protocol Assessment were as follows:   History Pulmonary Disease: Chronic pulmonary disease  Respiratory Pattern: Dyspnea on exertion or RR 21-25 bpm  Breath Sounds: Slightly diminished and/or crackles  Cough: Weak, productive  Indication for Bronchodilator Therapy: On home bronchodilators  Bronchodilator Assessment Score: 8    Aerosolized bronchodilator medication orders have been revised according to the RT Inhaler-Nebulizer Bronchodilator Protocol below. Respiratory Therapist to perform RT Therapy Protocol Assessment initially then follow the protocol. Repeat RT Therapy Protocol Assessment PRN for score 0-3 or on second treatment, BID, and PRN for scores above 3. No Indications - adjust the frequency to every 6 hours PRN wheezing or bronchospasm, if no treatments needed after 48 hours then discontinue using Per Protocol order mode. If indication present, adjust the RT bronchodilator orders based on the Bronchodilator Assessment Score as indicated below. Use Inhaler orders unless patient has one or more of the following: on home nebulizer, not able to hold breath for 10 seconds, is not alert and oriented, cannot activate and use MDI correctly, or respiratory rate 25 breaths per minute or more, then use the equivalent nebulizer order(s) with same Frequency and PRN reasons based on the score.   If a patient is on this medication at home then do not decrease Frequency below that used at home. 0-3 - enter or revise RT bronchodilator order(s) to equivalent RT Bronchodilator order with Frequency of every 4 hours PRN for wheezing or increased work of breathing using Per Protocol order mode. 4-6 - enter or revise RT Bronchodilator order(s) to two equivalent RT bronchodilator orders with one order with BID Frequency and one order with Frequency of every 4 hours PRN wheezing or increased work of breathing using Per Protocol order mode. 7-10 - enter or revise RT Bronchodilator order(s) to two equivalent RT bronchodilator orders with one order with TID Frequency and one order with Frequency of every 4 hours PRN wheezing or increased work of breathing using Per Protocol order mode. 11-13 - enter or revise RT Bronchodilator order(s) to one equivalent RT bronchodilator order with QID Frequency and an Albuterol order with Frequency of every 4 hours PRN wheezing or increased work of breathing using Per Protocol order mode. Greater than 13 - enter or revise RT Bronchodilator order(s) to one equivalent RT bronchodilator order with every 4 hours Frequency and an Albuterol order with Frequency of every 2 hours PRN wheezing or increased work of breathing using Per Protocol order mode. RT to enter RT Home Evaluation for COPD & MDI Assessment order using Per Protocol order mode.     Electronically signed by Rodolfo Latif RCP on 5/10/2022 at 9:05 AM

## 2022-05-10 NOTE — PROGRESS NOTES
RT Inhaler-Nebulizer Bronchodilator Protocol Note    There is a bronchodilator order in the chart from a provider indicating to follow the RT Bronchodilator Protocol and there is an Initiate RT Inhaler-Nebulizer Bronchodilator Protocol order as well (see protocol at bottom of note). CXR Findings:  XR CHEST PORTABLE    Result Date: 5/10/2022  Hypoinflated lungs with residual basilar opacities likely representing atelectasis. The technique and hypoinflation will exaggerate the pulmonary vasculature. The findings from the last RT Protocol Assessment were as follows:   History Pulmonary Disease: Chronic pulmonary disease  Respiratory Pattern: Dyspnea on exertion or RR 21-25 bpm  Breath Sounds: Slightly diminished and/or crackles  Cough: Weak, productive  Indication for Bronchodilator Therapy: On home bronchodilators  Bronchodilator Assessment Score: 8    Aerosolized bronchodilator medication orders have been revised according to the RT Inhaler-Nebulizer Bronchodilator Protocol below. Respiratory Therapist to perform RT Therapy Protocol Assessment initially then follow the protocol. Repeat RT Therapy Protocol Assessment PRN for score 0-3 or on second treatment, BID, and PRN for scores above 3. No Indications - adjust the frequency to every 6 hours PRN wheezing or bronchospasm, if no treatments needed after 48 hours then discontinue using Per Protocol order mode. If indication present, adjust the RT bronchodilator orders based on the Bronchodilator Assessment Score as indicated below. Use Inhaler orders unless patient has one or more of the following: on home nebulizer, not able to hold breath for 10 seconds, is not alert and oriented, cannot activate and use MDI correctly, or respiratory rate 25 breaths per minute or more, then use the equivalent nebulizer order(s) with same Frequency and PRN reasons based on the score.   If a patient is on this medication at home then do not decrease Frequency below that used at home. 0-3 - enter or revise RT bronchodilator order(s) to equivalent RT Bronchodilator order with Frequency of every 4 hours PRN for wheezing or increased work of breathing using Per Protocol order mode. 4-6 - enter or revise RT Bronchodilator order(s) to two equivalent RT bronchodilator orders with one order with BID Frequency and one order with Frequency of every 4 hours PRN wheezing or increased work of breathing using Per Protocol order mode. 7-10 - enter or revise RT Bronchodilator order(s) to two equivalent RT bronchodilator orders with one order with TID Frequency and one order with Frequency of every 4 hours PRN wheezing or increased work of breathing using Per Protocol order mode. 11-13 - enter or revise RT Bronchodilator order(s) to one equivalent RT bronchodilator order with QID Frequency and an Albuterol order with Frequency of every 4 hours PRN wheezing or increased work of breathing using Per Protocol order mode. Greater than 13 - enter or revise RT Bronchodilator order(s) to one equivalent RT bronchodilator order with every 4 hours Frequency and an Albuterol order with Frequency of every 2 hours PRN wheezing or increased work of breathing using Per Protocol order mode.          Electronically signed by Adriana Cruz RCP on 5/10/2022 at 7:16 PM

## 2022-05-10 NOTE — PROGRESS NOTES
[facility]  SPEECH [facility]OLOGY  Fiberoptic Endoscopic Evaluation of Swallowing  (FEES)  RECOMMENDATIONS:   Solids: IDDSI 4 puree solids  Liquids: IDDSI 0 Thin Liquids  ENT consult needed. Pt is aphonic with bilateral VF edema and significant anterior glottal gap. NAME: Khloe Melvin  YOB: 1965  GENDER: male  MRN: [id number]  ACCOUNT #: [de-identified]  REFERRING PHYSICIAN: Dr. Herman Sender  DIAGNOSIS: Dysphagia, aphonic   Onset Date: Admitted to 04/21/2022  PAIN: The patient does not complain of pain     EXAM DATE: Today (05/10/2022)  EXAM LOCATION:  Pt's room in [facility] ICU    CASE HISTORY: \"50 y. o. male who presented to the hospital from his dialysis unit where he had been getting dialysis with hypotension.  He received about 50 minutes showed dialysis when he became hypotensive.  Hypotension remained persistent and significant and he was brought to the emergency department to get evaluated.  In the ER he was septic and hypotensive.  He had to be started on vasopressors and given a dose of hydrocortisone. Krystin Benites was immediately admitted to the ICU where I saw him.  In ICU he has spiked a fever of 102.4 Nesh[place] Issa is currently on 3 pressors.  In emergency department work-up was concerning for healthcare associated pneumonia. Krystin Benites was also noted to have a pretty significant decubitus wound that appeared infected.  His right lower extremity was also erythematous and appeared cellulitic.  He he will continue to remain in the ICU for medical treatment. \"    Patient Complaints:  Odynophagia: [x]? Yes []? No  Globus Sensation: []? Yes [x]? No  SOB with PO intake: [x]? Yes []? No  Increased WOB with PO intake: []? Yes [x]? No  Reflux Sx's: []? Yes [x]? No  Weight loss: []? Yes [x]? No  Coughing/Choking with PO intake: []? Yes [x]? No  Reduced Appetite: []? Yes [x]?  No     Additional Reported Symptoms/Complaints: Known to service from admission July of 2021 with respiratory failure and sepsis, with recs for diet textures modifications initially (IDDSI 5 and 3), clinically advanced to regular textures and thin liquids prior to discharge.     Predisposing dysphagia risk factors: COPD, Other chronic respiratory illness and Hx of recurrent intubation  Clinical signs of possible chronic dysphagia: hx of dysphagia  Precipitating dysphagia risk factors: reduced physical mobility, increased O2 demands and recent intubation. FLEXIBLE ENDOSCOPIC EXAM: Scope passed through right nare with no complications. No bleeding. TESTING POSITION: Pt seated upright in bed at 90 degrees    ORAL MOTOR FUNCTION:  WFL    BASE OF TONGUE RANGE OF MOTION:  WFL      LATERAL PHARYNGEAL WALL RANGE OF MOTION: Grossly WFL,some secretions noted along posterior pharyngeal wall at baseline    VELOPHARYNGEAL FUNCTION: WFL    TRUE VOCAL FOLDS:  increased thickness of left TVF, bilateral posterior protrusions with erythema. Significant anterior glottal gap      FALSE VOCAL FOLDS: Possible overcompensation of left FVF vs positional?     ARYTENOID CARTILAGE: mild edema bilaterally, increased on left side vs right    ARYEPIGLOTTIC FOLDS: Grossly WFL bilaterally    INTERARYTENOID SPACE: Grossly WFL    BASELINE SECRETIONS: Baseline secretions along posterior pharyngeal wall. Significant secretions noted atop the UES and in bilateral pyriform sinus. Pt unable to clear with cued cough. EPIGLOTTIC RIM RESTING POSITION: Epiglottis rests off base of tongue     VOCAL PITCH: Reduced- aphonic    VOCAL INTENSITY: Reduced- aphonic    VOCAL QUALITY: Aphonic, ongoing since extubation    RESPIRATORY SUPPORT FOR PHONATION: Reduced, easily fatigued    SUSTAINED PHONATION: ADAM- due to aphonia    COUGH ABILITY (VOLITIONAL / SPONTANEOUS): Perceptually weak (significant), wet, congested, non-productive.     SALIVA SWALLOW ABILITY (VOLITIONAL / SPONTANEOUS): WFL    THROAT CLEAR ON CUE: DNT    THROAT DISCOMFORT: Pt did not complain of discomfort    SPEECH INTELLIGIBILITY: ~60% secondary to aphonia vs dysarthria     NASAL EMISSION: None noted      PO TRIALS    THIN LIQUID (ICE CHIPS/TSP/CUP/STRAW): with ice chips, questionable episode of penetration given slight gleaming on bilateralTVF's, however, gleaming was also noted at baseline, therefore cannot r/o secretions. No evidence of tracheal aspiration. Use of ice chips aided in clearance of pharyngeal secretions. Assessed with tsp and straw sips of thin liquids. No premature spillage, swallow timing appears timely. No penetration or aspiration with thin liquids via tsp and cup sip. Did note occasional, intermittent spontaneous opening of pharyngoesophageal segment throughout. PUREE FOOD TSP: no premature spillage to pharynx, good pharyngeal clearance and pharyngeal stripping wave, swallow appears timely, no penetration or aspiration. Did not assess further solids as pt became progressively fatigued as trials progressed.      BACKFLOW OF SECRETIONS: No bolus colored backflow noted at UES    COMPENSATORY STRATEGIES / POSTURAL CHANGES TRIALED:   - upright posture   - slow rate of intake  - small bites and sips  - total assist feed        PENETRATION-ASPIRATION SCALE (PAS)  [] 8 Material enters the airway, passes below the vocal folds, and no effort is made to eject  [] 7 Material enters the airway, passes below the vocal folds, and is not ejected from the trachea despite effort  [] 6 Material enters the airway, passes below the vocal folds, and is ejected into the larynx or out of the airway  [] 5 Material enters the airway, contacts the vocal folds, and is not ejected into the airway  [x] 4 Material enters the airway, contacts the vocal folds, and is ejected from the airway  [] 3 Material enters the airway, remains above the vocal folds, and is not ejected from airway  [] 2 Material enters the airway, remains above the vocal folds, and is ejected from airway  [] 1 Material does not enter the airway      IMPRESSION:  Questionable episode of penetration with ice chips, however, gleaming to b/l TVF's also noted at baseline, therefore cannot r/o secretions. Ice chips appeared to enhance in clearance of pharyngeal secretions. No penetration or aspiration noted with thin liquids via tsp or straw or puree solids. Fatigue appears to be an issue for this patient with progressive fatigue noted as trials progressed. Not surprising considering prolonged intubation, current use of CRRT and probable disuse atrophy of laryngopharyngeal structures. Pt is aphonic with notable edema to bilateral TVF's and arytenoids. There is significant anterior glottal gap noted with phonation. These are likely secondary to prolonged intubation. Recommended ENT consult. This was discussed in person with Dr. Lucie Santamaria during FEES/rounds. Dysphagia Treatment Goals  Short Term Goals (5 days- 05/15/2022)  Goal 1: The patient will tolerate recommended diet with no clinical s/s of aspiration 5/5  Goal 2: The patient will tolerate therapeutic diet upgrade trials with no clinical s/s of aspiration 5/5  Goal 3: The patient/caregiver will demonstrate understanding of compensatory swallow strategies, for improved swallow safety  Long Term Goals (7 days (05/17/2022)  Goal 1: The patient will tolerate least restrictive diet with no clinical s/s of aspiration or worsening respiratory/pulmonary status    ENDOSCOPE REMOVAL: Endoscope was removed without incident, no adverse reactions. PRE TEST HR: 101  PRE TEST O2: 97% on 10 LPM NC  POST TEST HR: 102  POST TEST O2: 95% on 10 LPM NC    RECOMMENDATIONS:   Solids: IDDSI 4 puree solids  Liquids: IDDSI 0 Thin Liquids  ENT consult needed. Pt is aphonic with bilateral VF edema and significant anterior glottal gap.     RECOMMENDED COMPENSATORY STRATEGIES / POSTURAL CHANGES:  - upright posture   - slow rate of intake  - small bites and sips  - total assist feed    EDUCATION:  Reviewed results and recommendations of this evaluation, signs, symptoms, and risk of aspiration, as well as diet recommendations and strategies.   Reviewed and discussed goals and plan of care with patient, family (mother and daughter), RN, and MD    TREATMENT TIME:  0466-7645, 37 mins  FEES eval 1 unit  Dysphagia treat 1 unit    ELECTRONIC SIGNATURE:  Solitario Dillon M.A., Ally[facility] #[id number]  Speech-Language Pathologist  Phone: [phone], [phone]

## 2022-05-10 NOTE — PROGRESS NOTES
Holding PO dose of midodrine d/t pt not being able to come off BiPap at this time.    Maria Antonia Darby RN, BSN

## 2022-05-11 NOTE — PROGRESS NOTES
FiO2 increased to 100%  IPAP increased to 24  EPAP 6    SpO2 94%         05/11/22 0730   NIV Type   Skin Assessment Clean, dry, & intact   Skin Protection for O2 Device No   Equipment Type v60   Mode Bilevel   Mask Size   (C)   Settings/Measurements   IPAP 24 cmH20   CPAP/EPAP 6 cmH2O   Rate Ordered 20   Resp 24   Insp Rise Time (%) 2 %   FiO2  100 %   I Time/ I Time % 1 s   Vt Exhaled 440 mL   Mask Leak (lpm) 6 lpm   Comfort Level Fair   Using Accessory Muscles Yes   SpO2 93   Breath Sounds   Right Upper Lobe Rhonchi;Diminished   Right Middle Lobe Diminished   Right Lower Lobe Diminished   Left Upper Lobe Diminished;Rhonchi   Left Lower Lobe Diminished   Alarm Settings   Alarms On Y   Low Pressure 8 cmH2O   High Pressure  4 cmH2O   Apnea (secs) 20 secs   RR Low  24   RR High 40 br/min   Oxygen Therapy/Pulse Ox   O2 Device PAP (positive airway pressure)  (bipap)   SpO2 90 %

## 2022-05-11 NOTE — PROGRESS NOTES
EOS report given to [facility], RN. Changes overnight:  · Levophed was down to 6 mcg/min, but pt became extremely hypotensive after PRN dilaudid administration. Levophed currently up to 12 mcg/min, fluid removal set at zero on CRRT. · Remained on BiPAP throughout the night since around 1100 yesterday. VBG added to AM labs; pCO2  68.6, pH 7.203. · CHG bath and wound care completed around 0400. Pt refused repositioning overnight, wanting to rest without interruption. Importance of frequent repositioning and skin care provided. No other changes noted. Pt stable at this time, care transferred.
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
## 2022-05-11 NOTE — PROGRESS NOTES
Per Dr Rock Bustillos, Stop CRRT d/t increase in amount of PVC. CRRT stopped and blood returned without complications, bronch performed. Propofol started, restraints in place.

## 2022-05-11 NOTE — PROGRESS NOTES
Speech Language Pathology    SLP attempted dysphagia f/u -- pt currently on BiPAP. ST to continue to follow and re-attempt f/u at a later time. 1245- Pt now intubated. ST to sign-off at this time. Please re-refer ST once pt is able and appropriate.      Juan Alberto Kirkland M.S. [id number] [facility]  Speech-language pathologist  [id number]  Phone: [phone]

## 2022-05-11 NOTE — PROGRESS NOTES
Physical Therapy    Patient was unavailable for PT session since he with another provider. Patient will be followed up later as schedule permits. No charge.   Jaden Martinez, MS PT, # [id number]

## 2022-05-11 NOTE — PROGRESS NOTES
Pt intubated with 8.0 ett secured at 24cm  Bilateral BS  Positive color change         05/11/22 1213   NICU Vent Information   Vt (Set, mL) 490 mL   Vt Exhaled 472 mL   Resp Rate (Set) 20 bmp   Rate Measured 20 br/min   Minute Volume 9.41 Liters   Peak Flow 60 L/min   Pressure Support 0 cmH20   FiO2  100 %   Peak Inspiratory Pressure 34 cmH2O   I:E Ratio 1:2.30   Sensitivity 3   High Peep/I Pressure 0   PEEP/CPAP (cmH2O) 8   I Time/ I Time % 0 s   Mean Airway Pressure 15 cmH20   Cough/Sputum   Sputum How Obtained Endotracheal;Tracheal   Breath Sounds   Right Upper Lobe Rhonchi;Diminished   Right Middle Lobe Diminished   Right Lower Lobe Diminished   Left Upper Lobe Diminished;Rhonchi   Left Lower Lobe Diminished   Additional Respiratory Assessments   Pulse 89   Resp 20   SpO2 99 %   Position Semi-Mejía's   Humidification Source Heated wire   Humidification Temp 37   Vent Alarm Settings   High Pressure  45 cmH2O   Low Minute Volume Alarm 2.29 L/min   RR High 0 br/min

## 2022-05-11 NOTE — PROGRESS NOTES
Order for rapid sequence intubation obtained. Patient pre-oxygenated to with BiPAP to a saturation 95 %. 100 mg of Ketamine ordered and administered at 11:17 AM hours. And flush 11:17 AM.      100 mg of Rocuronium ordered and administered at 11:18 AM hours and flush 11:18 AM.        Recent Labs     05/10/22  2123 05/11/22  0248 05/11/22  0824   * 132* 134*   K 4.1 4.1 4.6   BUN 7 7 7   CREATININE 0.6* 0.6* 0.7*   MG 2.30 2.40 2.40         Dr. Mandy Asher inserted a number  8 ET tube. The ET tube is secured at 24 cm measured at the patients lip line. Breath sounds are equal bilaterally. There is a positive color change noted in the colorimetric CO2 detector. RT connected the patient to a ventilator. See orders for ventilator orders. OG tube inserted to a depth of 60 cm. Ausculation of air bolus is positive. Aspirated stomach contents are green. POST Intubation ORDERS    ABG ordered in 2 hours  Portable Chest x-ray ordered to confirm placement of the patients ET tube and gastric tube. Bilateral wrist restraints ordered and initiated. Pulses present distal to restraints. Propofol drip ordered for sedation. See EMAR and orders. See physician note to follow.  Electronically signed by Rip Perez RN on 5/11/2022 at 11:03 AM

## 2022-05-11 NOTE — PROGRESS NOTES
CRRT filter was changed. Pt tolerated well. Nephrology rounding and changed to keep even, order updated. Dilaudid was discontinued by Dr Samantha Adams this morning. Pt family in room and updated. Pt more alert at this time and will get repeat ABG.
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
## 2022-05-11 NOTE — PROGRESS NOTES
GI consult called to office, Dr. Marianela Pena has seen patient previously, 5/11/22 @ [facility]

## 2022-05-11 NOTE — PROGRESS NOTES
05/11/22 1928   Patient Observation   Resp 20   SpO2 93 %   Breath Sounds   Right Upper Lobe Rhonchi   Right Middle Lobe Rhonchi   Right Lower Lobe Diminished   Left Upper Lobe Rhonchi   Left Lower Lobe Diminished   Airway Clearance   Suction ET Tube   Subglottic Suction Done Yes   Suction Device Inline suction catheter   Sputum Method Obtained Endotracheal   Sputum Amount Large   Sputum Color/Odor Bloody; White   Sputum Consistency Thick   Vent Settings   FiO2  70 %   Resp Rate (Set) 20 bmp   Vt (Set, mL) 490 mL   PEEP/CPAP (cmH2O) 8   Trigger Sensitivity Flow (L/min) 3 L/min   Vent Patient Data (Readings)   Vt Exhaled 472 mL   Rate Measured 20 br/min   Minute Volume 9.54 Liters   Peak Flow 60 L/min   Pressure Support 0 cmH20   I:E Ratio 1:2.30   Sensitivity 3   Peak Inspiratory Pressure 35 cmH2O   Mean Airway Pressure 16 cmH20   High Peep/I Pressure 0   I Time/ I Time % 0 s   Plateau Pressure (cm H2O) 24 cm H2O   Vent Alarm Settings   High Pressure  45 cmH2O   Low Minute Volume Alarm 2.29 L/min   Ve Max 20   Ve Min 4   Vt Low  300 mL   RR High 0 br/min   Apnea (Secs) 20 secs   Ambu Bag With Mask At Bedside Yes   Additional Respiratory Assessments   Position Semi-Mejía's   Humidification Source Heated wire   Humidification Temp 37   Circuit Condensation Drained   ETT (adult)   Placement Date/Time: 05/11/22 1130   Preoxygenation: Yes  Airway Type: Cuffed  Airway Tube Size: 8 mm  Blade Size: 4  Location: Oral  Insertion attempts: 1  Secured At: 24 cm  Measured From: Lips   Secured At 24 cm   Measured From Lips   ETT Placement Right   Secured By Commercial tube simon   Site Assessment Dry

## 2022-05-11 NOTE — CARE COORDINATION
INTERDISCIPLINARY PLAN OF CARE CONFERENCE    Date/Time: 5/11/2022 10:41 AM  Completed by: Twila Flores RN, Case Management      Patient Name:  Silvia John  YOB: 1965  Admitting Diagnosis: Hyperkalemia [E87.5]  Hypoglycemia [E16.2]  ESRD on dialysis ([facility] [address] [N18.6, Z99.2]  Acute cystitis with hematuria [N30.01]  Septic shock ([facility] [address] [A41.9, R65.21]  Sepsis ([facility] [address] [A41.9]  Pneumonia due to infectious organism, unspecified laterality, unspecified part of lung [J18.9]     Admit Date/Time:  4/21/2022  1:21 PM    Chart reviewed. Interdisciplinary team contacted or reviewed plan related to patient progress and discharge plans. Disciplines included Case Management, Nursing, and Dietitian. Current Status:INPT  PT/OT recommendation for discharge plan of care: TBD    Expected D/C Disposition:  TBD     Discharge Plan Comments: Pt cont in ICU on bipap, levophed and CRRT. CM following for discharge needs.     Home O2 in place on admit: Yes

## 2022-05-11 NOTE — PROGRESS NOTES
Spoke with Nephrology MD, will redraw labs at 1400 and update Nephro when results are back regarding restarting CRRT vs. Dialysis.

## 2022-05-11 NOTE — PROGRESS NOTES
05/10/22 3193   NIV Type   NIV Started/Stopped On   Equipment Type v60   Mode Bilevel   Mask Type Other (Comment)   Settings/Measurements   IPAP 16 cmH20   CPAP/EPAP 8 cmH2O   Rate Ordered 14   Resp 28   FiO2  35 %   I Time/ I Time % 0.9 s   Vt Exhaled 406 mL   Minute Volume 11.4 Liters   Mask Leak (lpm) 1 lpm   Comfort Level Good   Using Accessory Muscles No   SpO2 95   Oxygen Therapy/Pulse Ox   O2 Therapy Oxygen   O2 Device PAP (positive airway pressure)   SpO2 92 %

## 2022-05-11 NOTE — PROGRESS NOTES
Lab still working on getting blood cultures. Antibiotics to be held until second set drawn per Dr Ramon Harrington.

## 2022-05-11 NOTE — PROGRESS NOTES
Pt son and daughter in room and plan of care reviewed. All questions answered. State that they had discussed with pt previously regarding a tracheostomy. No discussion was had regarding a feeding tube today with this RN by Dr Riaz Velarde, will follow up.

## 2022-05-11 NOTE — PROGRESS NOTES
Shift assessment completed, see flow sheet. Medications administered per MAR. SpO2 97% on BiPAP; 50% and 20/8 (weaned down to 35% now). Bilateral LS diminished with slight rhonchi. A&O x4.   Pt c/o pain, PRN dilaudid administered per orders. Heparin gtt infusing at 1050 units/hr for [facility], aPTT therapeutic. Levophed gtt infusing for a MAP > 65, BP 99/67 (78) on 7 mcg/min (weaned down to 5 mcg/min now). CRRT running w/o issue with a goal to remove 25 ml/hr. Pt tolerating with help from low-dose pressors. ML, VC, and port in place and functioning appropriately, dressings C/D/I. Pureed food diet ordered, no drink or snack offered at this time d/t pt being on continuous BiPAP right now d/t elevated CO2. Delgado catheter in place draining cloudy, brown urine with very minimal output d/t CRRT and hx CKD. Colostomy in place draining brown, paste-like stool. Pt's mother, Brett De La Garza, updated at this time. All lines and monitoring devices remain in place. Call light within reach, bed locked and in lowest position. Will continue to monitor.

## 2022-05-11 NOTE — CONSULTS
Attempted to see pt for wound/ostomy care. Currently being intubated. Will follow up tomorrow. Discussed with Dr Nash Tinoco.
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
## 2022-05-11 NOTE — PROCEDURES
PROCEDURE:  BRONCHOSCOPY WITH BRONCHOALVEOLAR LAVAGE    PRE-PROCEDURE EVALUATION:  Nursing History and Physical reviewed and agreed upon   See my note from today  Allergies and medications have been reviewed    HENT: ETT in place. Intubated not able to assess Mallampati  Cardiovascular: Normal rate, regular rhythm, normal heart sounds. Pulmonary/Chest: No wheezes. Bilateral rhonchi. No rales. Abdominal: Soft. Bowel sounds are normal. No distension. ASA CLASS    IV. Severe Systemic Disease which is a threat to life. Sedation plan:   Continue ICU sedation    Post Procedure Plan   Continue ICU care     The risks and benefits as well as alternatives to the procedure have been discussed with the patient's family. The patient's next of kin understands and agrees to proceed. DESCRIPTION OF PROCEDURE: A time out was taken. ICU sedation continued. The scope was passed with ease via the endotracheal tube. A complete airway inspection was performed. No endobronchial lesions were identified. There were large thin diffuse bilateral purulent secretions with lower lobe mucous plugs. Saline injection followed by therapeutic aspiration was performed. Washings were obtained throughout the airways. A Bronchoalveolar lavage was obtained from the right middle lobe with good return. The patient tolerated the procedure well. Estimated blood loss was none. Recovery will be per endoscopy protocol.     FOLLOW UP:  Cultures

## 2022-05-11 NOTE — PROGRESS NOTES
5/11  aPTT = 82.9 sec at 0248. Continue heparin 1050 units/hr. Continue aPTT daily.   Merryl Epley, PharmD  5/11/2022 7:18 AM

## 2022-05-11 NOTE — PROGRESS NOTES
Hospitalist Progress Note      PCP: Clay Chowdary MD    Date of Admission: 4/21/2022    Subjective:     49 y.o. male with hx of ischemic CM, paraplegia, IDDM, ESRD on HD presents with bacteremia and decubitus ulcers, septic shock     Improving sepsis from last week, off sammi, vaso, dobutamine, now remains on levophed,      Ongoing CRRT with fluid removal   No fevers    Pt seen off vent this am, feels fine and reports weak cough  - having secretions that is requiring NT suctioning. He is on AirVO      5/8-he failed swallowing eval.  He used BiPAP last night. Mentation is normal.  On Airvo with FiO2 45%. 5/9  Failed swallow eval over the weekend  Has trouble clearing secretions  AirVo currently  and bipap all night. On CRRT  On Levo    5/10  On BiPAP for most of yesterday and all night. Currently on Airvo. CRRT running negative  On Levophed    5/11  BiPAP since yesterday with 80 was an FiO2. Unable to come off BiPAP. Worsening blood gas  Agreed for intubation.   Levophed at 20 mcg/min  CRRT being temporarily held        Medications:  Reviewed    Infusion Medications    dextrose 5 % and 0.9 % NaCl Stopped (05/10/22 1851)    heparin (PORCINE) Infusion 1,050 Units/hr (05/11/22 0500)    dextrose      prismaSATE BGK 4/2.5 500 mL/hr at 05/11/22 0639    prismaSATE BGK 4/2.5 500 mL/hr at 05/11/22 0639    prismaSATE BGK 4/2.5 500 mL/hr at 05/11/22 0638    norepinephrine 20 mcg/min (05/11/22 0703)    sodium chloride Stopped (05/10/22 2311)     Scheduled Medications    epoetin bebo-epbx  3,000 Units SubCUTAneous Once per day on Mon Wed Fri    ipratropium-albuterol  1 ampule Inhalation Q4H While awake    insulin glargine  25 Units SubCUTAneous Nightly    sennosides-docusate sodium  2 tablet Oral BID    midodrine  10 mg Oral TID WC    insulin lispro  0-18 Units SubCUTAneous Q4H    pantoprazole  40 mg IntraVENous Daily    menthol-zinc oxide   Topical Daily    aspirin  81 mg Oral Nightly    sodium chloride flush  5-40 mL IntraVENous 2 times per day     PRN Meds: HYDROmorphone, heparin (porcine), heparin (porcine), oxyCODONE-acetaminophen, traZODone, ipratropium-albuterol, dextrose bolus **OR** dextrose bolus, glucagon (rDNA), dextrose, potassium chloride, magnesium sulfate, calcium gluconate **OR** calcium gluconate **OR** calcium gluconate **OR** calcium gluconate, sodium phosphate IVPB **OR** sodium phosphate IVPB **OR** sodium phosphate IVPB **OR** sodium phosphate IVPB, glucose, midazolam, fentanNYL, sodium chloride flush, sodium chloride, ondansetron **OR** ondansetron, polyethylene glycol, acetaminophen **OR** acetaminophen, perflutren lipid microspheres      Intake/Output Summary (Last 24 hours) at 5/11/2022 0745  Last data filed at 5/11/2022 0700  Gross per 24 hour   Intake 1570 ml   Output 2588 ml   Net -1018 ml       Physical Exam Performed:    BP (!) 75/49   Pulse 99   Temp 96.1 °F (35.6 °C) (Bladder)   Resp (!) 34   Ht 6' 2\" (1.88 m)   Wt 238 lb 3.2 oz (108 kg)   SpO2 94%   BMI 30.58 kg/m²       General appearance:  off vent, fully awake, alert and oriented   HEENT: NAD,  Neck: Supple, . No jugular venous distention. Trachea midline. Respiratory: Diminished breath sounds bilaterally  Cardiovascular: S 1 s2 heard,  Right chest HD catheter and Port noted  Abdomen: Soft, non-tender, +distended with normal bowel sounds. Colostomy noted in left LQ  Musculoskeletal: Right BKA with stump healthy.  Superficial skin ulcers on right thigh with no active infection noted  Left LE edema with superficial ulceration noted   Skin: see Epic wound pictures, chronic exposed hardware in lower thoracic and lumbar region   Neurologic: paraplegic , atrophy of forearm and hand muscles   Fully awake  alert oriented    Labs:   Recent Labs     05/09/22  1500 05/10/22  0901 05/11/22  0248   WBC 6.8 5.7 10.2   HGB 9.8* 10.3* 10.0*   HCT 30.9* 32.2* 31.4*    152 187     Recent Labs     05/10/22  1500 bilateral atelectasis or airspace disease, similar to   prior, with persistent small pleural effusions. XR CHEST PORTABLE   Final Result   Stable chest.         XR CHEST PORTABLE   Final Result   Endotracheal tube tip projects at the mid intrathoracic trachea. Otherwise   stable chest.         XR CHEST PORTABLE   Final Result   Suboptimal examination due to patient rotation. The chest appears stable. XR CHEST PORTABLE   Final Result   Endotracheal tube tip projects at the thoracic inlet. Otherwise stable chest.         XR CHEST PORTABLE   Final Result   Stable chest.         XR CHEST PORTABLE   Final Result   Stable endotracheal tube located approximately 3.3 cm above the quintin. Low lung volumes. Suspected small right pleural effusion. XR CHEST PORTABLE   Final Result   Endotracheal tube in satisfactory position above the quintin      Bibasilar hypoaeration persist         CT ABDOMEN PELVIS W IV CONTRAST Additional Contrast? None   Final Result   1. Moderate amount of ascites with 3rd spacing including edematous changes   throughout the abdomen and anasarca. 2. Delgado in place. Diffuse bladder wall thickening. Correlate for   underlying cystitis. 3. No acute bowel pathology. Mild gastric distension. Diverticulosis with   no acute features. 4. Mild renal cortical scarring and asymmetric right renal atrophy, stable. No hydronephrosis. CT CHEST PULMONARY EMBOLISM W CONTRAST   Final Result   1. No evidence of acute pulmonary embolism. There is some thickening and   mild irregularity in the pulmonary arteries in the left lower lobe which may   represent chronic pulmonary embolism or secondary appearance to inflammation. No evidence of aortic aneurysm or dissection. 2. Moderate right effusion and right lower lobe atelectatic and/or   consolidative changes. Pneumonia is likely. There is severe loss of volume   in the right lung.   Trace left effusion and left lower lobe atelectatic   changes are seen. 3. Moderate ascites around the spleen and liver. XR CHEST PORTABLE   Final Result   Low lung volumes. Bilateral pleural effusions with bibasilar volume loss,   right greater than left. No overt failure. XR CHEST PORTABLE    (Results Pending)       Blood - 4/21 - Group A , strep pyogenes and Enterococcus Faecalis   Repeat blood - NG 4/23    Urine - Ecoli , Enterococcus Faecalis     Sputum- Acinetobacter baumannii    Assessment/Plan:    Septic shock. Enterococcus faecalis and strep pyogenes bacteremia. Right lower extremity cellulitis  Chronic pressure ulcers on the back with exposed hardware      Source could be HD line vs exposed hardware  Patient was on vancomycin, Merrem and clindamycin.  changed to  fortaz , steffany nebs for acinetobacter. Completed 14 days of IV Vanco. ID managing this. Severe hypotensive shock  needing sammi, vaso, dobutamine and levophed    on hydrocortisone for shock-->weaned off   Critical care managing  Improving hypotension, improved wbc, now only on levo --> levo dose had to be increased    End-stage renal disease on hemodialysis. Nephrology consulted  Initiated on CRRT   Fluid removal as tolerated , CRRT running neg   CRRT temporarily being held.     Acute hypoxic resp failure-COVID extubated. On Airvo. Healthcare associated pneumonia. Intubated in the ICU on 4/22. Antibiotics as above. S/p bronch 5/1/22 given worsening leucocytosis/fever  cx with acinetobacter   Weaned off vent now  IV ceftaz and steffany nebs  And IV vanc  - he is agreeable with re intubation and tracheostomy if necessary. Currently on air Vo. Used BiPAP most of yesterday and all last night. Had to be reintubated 5/11 given worsening blood gas even on BiPAP.       Chronic systolic congestive heart failure, EF 25 to 30%.   Ischemic CM/CAD s/p previous stents   Cardiology consulted  Hold home medications given hypotension  Off dobutamine   Considering to resume BB ,   Resume ASA , statins     Acute metabolic encephalopathy resolved. Secondary to septic shock  resolved     Type 2 diabetes. 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 Sagrario Ledesma     Paraplegia  Neurogenic bladder   - bed bound   - supportive care  - intermittent self catherizations , now has langley       Diet: ADULT DIET; Dysphagia - Pureed  ADULT ORAL NUTRITION SUPPLEMENT; Breakfast, Lunch, Dinner;  Low Calorie/High Protein Oral Supplement  Code Status: Full Code    Dispo - cont care  updated family     Wolf Wyatt MD 5/11/2022 7:45 AM
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
## 2022-05-11 NOTE — PROGRESS NOTES
05/11/22 0310   NIV Type   $NIV $Daily Charge   Skin Protection for O2 Device Yes   NIV Started/Stopped On   Equipment Type v60   Mode Bilevel   Settings/Measurements   IPAP 16 cmH20   CPAP/EPAP 8 cmH2O   Rate Ordered 20   Resp 27   FiO2  35 %   I Time/ I Time % 0.9 s   Vt Exhaled 486 mL   Minute Volume 13.4 Liters   Mask Leak (lpm) 1 lpm   Comfort Level Good   Using Accessory Muscles No   SpO2 91   Oxygen Therapy/Pulse Ox   $Oxygen $Daily Charge   SpO2 91 %   $Pulse Oximeter $Spot check (multiple/continuous)

## 2022-05-11 NOTE — PROGRESS NOTES
[address] [facility] - ENT  PROGRESS  NOTE    Date of Service: 5/11/2022    Spoke with Sebastian Mendoza his nurse and discussed patient is reintubated needing tracheostomy at this point in time. We will plan for tracheostomy tube placement on Monday morning May 16 at 9 AM.  Please make the patient n.p.o. and hold anticoagulation at midnight on Sunday.     [employer], DO

## 2022-05-11 NOTE — PROCEDURES
ENDOTRACHEAL INTUBATION    INDICATION: Life threatening respiratory failure    TIME OUT: taken    SEDATION: Ketamine and rocuronium    PROCEDURE: Using direct laryngoscopy, the vocal cords were well visualized and an 8 mm endotracheal tube was place directly through the cords. Good breath sounds auscultated bilaterally without sounds over abdomen. Good return of CO2 on the monitor. CXR is pending.

## 2022-05-12 NOTE — PROGRESS NOTES
RT Inhaler-Nebulizer Bronchodilator Protocol Note    There is a bronchodilator order in the chart from a provider indicating to follow the RT Bronchodilator Protocol and there is an Initiate RT Inhaler-Nebulizer Bronchodilator Protocol order as well (see protocol at bottom of note). CXR Findings:  XR CHEST PORTABLE    Result Date: 5/11/2022  1. Interval placement of endotracheal and orogastric tubes, in proper positions. 2. Other support apparatus is stable and unchanged. 3. Stable small bilateral pleural effusions. 4. Progressive multifocal airspace opacities within the bilateral mid and lower lung zones, likely a combination of progressive pulmonary edema and multifocal pneumonia. The findings from the last RT Protocol Assessment were as follows:   History Pulmonary Disease: (P) Chronic pulmonary disease  Respiratory Pattern: (P) Mild dyspnea at rest, irregular pattern, or RR 21-25 bpm  Breath Sounds: (P) Inspiratory and expiratory or bilateral wheezing and/or rhonchi  Cough: (P) Weak, productive  Indication for Bronchodilator Therapy: (P) On home bronchodilators  Bronchodilator Assessment Score: (P) 14    Aerosolized bronchodilator medication orders have been revised according to the RT Inhaler-Nebulizer Bronchodilator Protocol below. Respiratory Therapist to perform RT Therapy Protocol Assessment initially then follow the protocol. Repeat RT Therapy Protocol Assessment PRN for score 0-3 or on second treatment, BID, and PRN for scores above 3. No Indications - adjust the frequency to every 6 hours PRN wheezing or bronchospasm, if no treatments needed after 48 hours then discontinue using Per Protocol order mode. If indication present, adjust the RT bronchodilator orders based on the Bronchodilator Assessment Score as indicated below.   Use Inhaler orders unless patient has one or more of the following: on home nebulizer, not able to hold breath for 10 seconds, is not alert and oriented, cannot activate and use MDI correctly, or respiratory rate 25 breaths per minute or more, then use the equivalent nebulizer order(s) with same Frequency and PRN reasons based on the score. If a patient is on this medication at home then do not decrease Frequency below that used at home. 0-3 - enter or revise RT bronchodilator order(s) to equivalent RT Bronchodilator order with Frequency of every 4 hours PRN for wheezing or increased work of breathing using Per Protocol order mode. 4-6 - enter or revise RT Bronchodilator order(s) to two equivalent RT bronchodilator orders with one order with BID Frequency and one order with Frequency of every 4 hours PRN wheezing or increased work of breathing using Per Protocol order mode. 7-10 - enter or revise RT Bronchodilator order(s) to two equivalent RT bronchodilator orders with one order with TID Frequency and one order with Frequency of every 4 hours PRN wheezing or increased work of breathing using Per Protocol order mode. 11-13 - enter or revise RT Bronchodilator order(s) to one equivalent RT bronchodilator order with QID Frequency and an Albuterol order with Frequency of every 4 hours PRN wheezing or increased work of breathing using Per Protocol order mode. Greater than 13 - enter or revise RT Bronchodilator order(s) to one equivalent RT bronchodilator order with every 4 hours Frequency and an Albuterol order with Frequency of every 2 hours PRN wheezing or increased work of breathing using Per Protocol order mode. RT to enter RT Home Evaluation for COPD & MDI Assessment order using Per Protocol order mode.     Electronically signed by Eren Cole RCP on 5/12/2022 at 8:13 AM
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
## 2022-05-12 NOTE — PROGRESS NOTES
05/11/22 2246   Patient Observation   Pulse 94   Resp 20   SpO2 94 %   Breath Sounds   Right Upper Lobe Rhonchi   Right Middle Lobe Diminished   Right Lower Lobe Diminished   Left Upper Lobe Rhonchi   Left Lower Lobe Diminished   Airway Clearance   Suction ET Tube   Suction Device Inline suction catheter   Sputum Method Obtained Endotracheal   Sputum Amount Large   Sputum Color/Odor Bloody; Other (comment)  (creamy)   Sputum Consistency Thick   Vent Information   Vent Mode AC/VC   Vent Settings   FiO2  70 %   Resp Rate (Set) 20 bmp   Vt (Set, mL) 490 mL   PEEP/CPAP (cmH2O) 8   Trigger Sensitivity Flow (L/min) 3 L/min   Vent Patient Data (Readings)   Vt Exhaled 483 mL   Rate Measured 20 br/min   Minute Volume 9.32 Liters   Peak Flow 60 L/min   Pressure Support 0 cmH20   I:E Ratio 1:2.30   Sensitivity 3   Peak Inspiratory Pressure 30 cmH2O   Mean Airway Pressure 15 cmH20   High Peep/I Pressure 0   I Time/ I Time % 0 s   Plateau Pressure (cm H2O) 32 cm H2O   Vent Alarm Settings   High Pressure  45 cmH2O   Low Minute Volume Alarm 2.29 L/min   Ve Max 20   Ve Min 4   Vt Low  300 mL   RR High 0 br/min   Apnea (Secs) 20 secs   Ambu Bag With Mask At Bedside Yes   Additional Respiratory Assessments   Position Semi-Mejía's   Humidification Source Heated wire   Humidification Temp 37   Circuit Condensation Drained   ETT (adult)   Placement Date/Time: 05/11/22 1130   Preoxygenation: Yes  Airway Type: Cuffed  Airway Tube Size: 8 mm  Blade Size: 4  Location: Oral  Insertion attempts: 1  Secured At: 24 cm  Measured From: Lips   Secured At 26 cm   Measured From Lips   ETT Placement Center   Secured By Commercial tube simon   Site Assessment Dry

## 2022-05-12 NOTE — PROGRESS NOTES
05/12/22 1908   Patient Observation   Pulse 113   Resp 23   SpO2 97 %   Breath Sounds   Right Upper Lobe Rhonchi   Right Middle Lobe Diminished   Right Lower Lobe Diminished   Left Upper Lobe Rhonchi   Left Lower Lobe Diminished   Airway Clearance   Suction ET Tube   Suction Device Inline suction catheter   Sputum Color/Odor Other (comment)   Vent Information   Vent Mode AC/VC   Vent Settings   FiO2  50 %   Resp Rate (Set) 20 bmp   Vt (Set, mL) 490 mL   PEEP/CPAP (cmH2O) 8   Trigger Sensitivity Flow (L/min) 3 L/min   Vent Patient Data (Readings)   Vt Exhaled 528 mL   Rate Measured 22 br/min   Minute Volume 11.4 Liters   Peak Flow 60 L/min   Pressure Support 0 cmH20   I:E Ratio 1:2.30   Sensitivity 3   Peak Inspiratory Pressure 31 cmH2O   Mean Airway Pressure 16 cmH20   High Peep/I Pressure 0   I Time/ I Time % 0 s   Plateau Pressure (cm H2O) 25 cm H2O   Static Compliance (L/cm H2O) 31   Dynamic Compliance (L/cm H2O) 23 L/cm H2O   Vent Alarm Settings   High Pressure  45 cmH2O   Low Minute Volume Alarm 3 L/min   RR High 40 br/min   Additional Respiratory Assessments   Position Semi-Mejía's   Humidification Source Heated wire   Humidification Temp 37   Circuit Condensation Drained   Cuff Pressure (cm H2O) 28 cm H2O   ETT (adult)   Placement Date/Time: 05/11/22 1130   Preoxygenation: Yes  Airway Type: Cuffed  Airway Tube Size: 8 mm  Blade Size: 4  Location: Oral  Insertion attempts: 1  Secured At: 24 cm  Measured From: Lips   Secured At 26 cm   Measured From Lips   ETT Placement Left   Secured By Commercial tube simon   Site Assessment Dry

## 2022-05-12 NOTE — PROGRESS NOTES
Heparin restarted, PEG/trach placement to be on Monday  Will restart at previous rate of 1050 units/hr, Can follow with Anti-Xa now, given last dose of Eliquis on 5/6.   Anti-Xa ordered for 1800  Goal 0.3-0.7  Aiden Villavicencio Pharm D 5/12/202211:30 AM  .
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
## 2022-05-12 NOTE — PROGRESS NOTES
Pt care assumed from [facility] @ 1700  SpO2 93% on AC 70% and +8. Bilateral LS diminished and very rhonchorous. RASS -1 to -2 on 10 mcg/kg/min of Propofol. Levophed infusing at 17 mcg/min to keep MAP > 65. OG @ 60 and clamped. Delgado draining cloudy tea-colored urine, very low UO. Colostomy draining minimal amount of paste-like loose stool. Pt's daughter, mother, and wife () updated on pt changes. POC discussed in length, family agreeable and satisfied with care. Changes overnight  - Repeat abd/pelvis CT completed per Dr. Carolina Villegas orders. Pt NPO since midnight and heparin gtt turned off at 0600. - Levophed down to 2 mcg/min at one point, then had to be increased d/t frequent fluctuations in BP. Levophed increased to 7 mcg/min around shift change. - Propofol turned off and wrist restraints removed, pt calm w/o sedation. Using PRN fentanyl for pain frequently. - Kept even on CRRT per Dr. Shin Ulloa. Filter changed @ 2130 d/t error in recirculation during CT.    - CHG bath, wound care, and linen change completed.     Electronically signed by Romelia Underwood RN on 5/12/2022 at 8:09 AM
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
## 2022-05-12 NOTE — CONSULTS
[facility] Wound Ostomy Continence Nurse  Follow-up Progress Note       NAME:  Jose [address] RECORD NUMBER:  [id number]  AGE:  49 y.o. GENDER:  male  :  1965  TODAY'S DATE:  2022    Subjective: Pt is awake and alert, on vent, family at bedside. Wound Identification:  Wound Type: diabetic, pressure, non-healing surgical and non-healing/non-surgical  Contributing Factors: edema, diabetes, chronic pressure, decreased mobility, obesity and paraplegia        Patient Goal of Care:  [x] Wound Healing  [] Odor Control  [] Palliative Care  [] Pain Control   [] Other:     Pt seen for follow up for wound care. Pt re-intubated yesterday, now on CRRT. He is awake and nods head to yes/no questions. Family at bedside. Wound care discussed with Dr Katrin Erazo. Objective:    /84   Pulse 100   Temp 96.2 °F (35.7 °C) (Bladder)   Resp 20   Ht 6' 2\" (1.88 m)   Wt 238 lb 4.8 oz (108.1 kg)   SpO2 95%   BMI 30.60 kg/m²   Ismael Risk Score: Ismael Scale Score: 12  Assessment:   Measurements:  Wound 21 #82, Left Posterior Lower Leg Proximal, pressure Unstageable,, onset 2021 (Active)   Wound Image    22 1945   Wound Etiology Pressure Unstageable 22 0400   Dressing Status Clean;Dry; Intact 22 0400   Wound Cleansed Wound cleanser 22 0300   Dressing/Treatment Alginate with Ag; Foam 22 0400   Dressing Change Due 22 0400   Wound Length (cm) 1.5 cm 22 1406   Wound Width (cm) 0.5 cm 22 1406   Wound Depth (cm) 0.1 cm 22 1406   Wound Surface Area (cm^2) 0.75 cm^2 22 1406   Change in Wound Size % (l*w) -525 22 1406   Wound Volume (cm^3) 0.075 cm^3 22 1406   Wound Healing % -525 22 1406   Distance Tunneling (cm) 0 cm 22 1406   Undermining Maxium Distance (cm) 0 22 1406   Wound Assessment Pink/red 22 0400   Drainage Amount Scant 22 0400   Drainage Description Serosanguinous 05/12/22 0400   Odor None 05/12/22 0400   Chetna-wound Assessment Blanchable erythema 05/12/22 0400   Number of days: 187       Wound 11/05/21 # 81, mid-back, Surgical, full thickness, onset June 2015 (Active)   Wound Image   04/21/22 1945   Wound Etiology Surgical 05/12/22 0400   Dressing Status Clean;Dry; Intact 05/12/22 0400   Wound Cleansed Wound cleanser 05/12/22 0300   Dressing/Treatment Alginate with Ag; Foam 05/12/22 0400   Dressing Change Due 05/13/22 05/12/22 0400   Wound Length (cm) 9.7 cm 04/20/22 1406   Wound Width (cm) 10 cm 04/20/22 1406   Wound Depth (cm) 0.3 cm 04/20/22 1406   Wound Surface Area (cm^2) 97 cm^2 04/20/22 1406   Change in Wound Size % (l*w) -134.98 04/20/22 1406   Wound Volume (cm^3) 29.1 cm^3 04/20/22 1406   Wound Healing % -17 04/20/22 1406   Wound Assessment Exposed hardware;Pale granulation tissue;Slough 05/12/22 0400   Drainage Amount Small 05/12/22 0400   Drainage Description Serosanguinous 05/12/22 0400   Odor Mild 05/12/22 0400   Chetna-wound Assessment Non-blanchable erythema 05/12/22 0400   Number of days: 187       Wound 04/01/22 #85, Left Pretib, Trauma, Full Thickness, Onset 3/2022 (Active)   Wound Image   04/21/22 1945   Wound Etiology Traumatic 05/12/22 0400   Dressing Status Clean;Dry; Intact 05/12/22 0400   Wound Cleansed Wound cleanser 05/12/22 0300   Dressing/Treatment Alginate with Ag; Foam 05/12/22 0400   Dressing Change Due 05/13/22 05/12/22 0400   Wound Length (cm) 1 cm 04/20/22 1406   Wound Width (cm) 1 cm 04/20/22 1406   Wound Depth (cm) 0.2 cm 04/20/22 1406   Wound Surface Area (cm^2) 1 cm^2 04/20/22 1406   Change in Wound Size % (l*w) -150 04/20/22 1406   Wound Volume (cm^3) 0.2 cm^3 04/20/22 1406   Wound Healing % -400 04/20/22 1406   Wound Assessment Pink/red 05/12/22 0400   Drainage Amount Small 05/12/22 0400   Drainage Description Serous 05/12/22 0400   Odor None 05/12/22 0400   Chetna-wound Assessment Hyperpigmented 05/12/22 0400   Margins Attached edges 04/20/22 1406   Wound Thickness Description not for Pressure Injury Full thickness 04/20/22 1406   Number of days: 40     Back wound:  Exposed hardware,   scattered areas of partial and full thickness skin loss, less maceration, less drainage. Continue daily dressing changes with Alginate ag and foam.  Improved    RLE, medial thigh:  Scattered areas of black eschar, largest measuring 7x3.6cm, irregular edges. Surrounding skin partial and few full thickness areas of skin loss. Few black stable scabs to right knee. Less edema in stump. Right knee, skin:  Few areas of dry brown and black stable scabs. Left lateral LE:  Distal 2.4x1.5x0.2cm, 90% pink and moist 10% fibrinous tissue. Proximal:  4.5x2x0.5cm, 85% yellow slough and white fibrinous tissue, 15% scattered pink and moist tissue, edges are regular. Surrounding skin with maturation tissue, dry, flaky skin and few small areas of partial thickness skin loss and dry stable scabs proximally. Worsening, discussed with Dr Vivi Shaw. Pt's sacrum and buttocks remain intact, left heel intact. Response to treatment:  Well tolerated by patient. Pain Assessment:  Severity:  0 / 10  Quality of pain: N/A  Wound Pain Timing/Severity: none  Premedicated: Yes  Plan:  Discussed with Dr Vivi Shaw. LLE continue current treatment using alginate ag and foam Q3days. Utilize Z-onofre pillow (instead of hospital pillow)  For offloading and cup out pillow where wound is. Continue to float heel. Right medial thigh:  Apply Triad Paste to all skin surrounding black eschar. Then apply madeline thick Santyl ointment, cover with NS damp gauze, then dry gauze or ABD pads, change daily. Conintue current dressing chnages to back wound ans calmoseptine to sacrum and buttocks.         Plan of Care: Wound 04/01/22 #85, Left Pretib, Trauma, Full Thickness, Onset 3/2022-Dressing/Treatment: Alginate with Ag,Foam  Wound 11/05/21 #82, Left Posterior Lower Leg Proximal, pressure Unstageable,, onset 11/02/2021-Dressing/Treatment: Alginate with Ag,Foam  Wound 11/05/21 # 81, mid-back, Surgical, full thickness, onset June 2015-Dressing/Treatment: Alginate with Ag,Foam    Specialty Bed Required : Yes   [] Low Air Loss   [] Pressure Redistribution  [x] Fluid Immersion  [] Bariatric  [] Total Pressure Relief  [] Other:     Current Diet: Diet NPO Exceptions are: Ice Chips, Sips of Water with Meds  Dietician consult:  Yes    Discharge Plan:  Placement for patient upon discharge: TBD  Patient appropriate for Outpatient [address]: Yes    Referrals:  [x]   [] [address] [place] [facility]  [] Supplies  [] Other    Patient/Caregiver Teaching:  Level of patient/caregiver understanding able to:   [x] Indicates understanding       [] Needs reinforcement  [] Unsuccessful      [] Verbal Understanding  [] Demonstrated understanding       [] No evidence of learning  [] Refused teaching         [] N/A       Electronically signed by Anitra Housekeeper, RN, Charmayne Roys on 5/12/2022 at 10:27 AM

## 2022-05-12 NOTE — FLOWSHEET NOTE
05/12/22 1724   Encounter Summary   Encounter Overview/Reason  Initial Encounter   Service Provided For: Family; Patient and family together   Referral/Consult From: Nurse   Support System Family members   Last Encounter  05/12/22   Begin Time 1700   End Time  1730   Total Time Calculated 30 min   Spiritual/Emotional needs   Type Spiritual Support;Spiritual Distress   Grief, Loss, and Adjustments   Type Anticipatory Grief   Assessment/Intervention/Outcome   Assessment Calm;Coping; Hopeful;Powerlessness   Intervention Active listening;Discussed relationship with God;Nurtured Hope;Prayer (assurance of)/[place];Read/Provided Scripture;Sustaining Presence/Ministry of presence   Outcome Comfort;Encouraged;Engaged in conversation;Expressed Gratitude

## 2022-05-12 NOTE — PROGRESS NOTES
Occupational/Physical Therapy  Patient has been reintubated and is on CRRT, will need new orders when appropriate to resume therapy sevices.   You Head, OTR/L [id number]  Christel Bashir, MS PT, # [id number]

## 2022-05-12 NOTE — PROGRESS NOTES
AM assessment completed. AM labs reviewed. Pt intubated & on CRRT. CRRT running w/o incident- keeping pt even. Heparin gtt off for possible PEG placement. Port WNL. MIdline WNL. Delgado in place for strict I/o. Pt on levophed- increased to 9 mcg d/t hypotension. Nightshift RN completed daily wound care & bath w/ linen change. No new orders at present time.   Brad Shannon RN, BSN

## 2022-05-12 NOTE — PROGRESS NOTES
Hospitalist Progress Note      PCP: Joby Bartholomew MD    Date of Admission: 4/21/2022    Subjective:     49 y.o. male with hx of ischemic CM, paraplegia, IDDM, ESRD on HD presents with bacteremia and decubitus ulcers, septic shock     Improving sepsis from last week, off sammi, vaso, dobutamine, now remains on levophed,      Ongoing CRRT with fluid removal   No fevers    Pt seen off vent this am, feels fine and reports weak cough  - having secretions that is requiring NT suctioning. He is on AirVO      5/8-he failed swallowing eval.  He used BiPAP last night. Mentation is normal.  On Airvo with FiO2 45%. 5/9  Failed swallow eval over the weekend  Has trouble clearing secretions  AirVo currently  and bipap all night. On CRRT  On Levo    5/10  On BiPAP for most of yesterday and all night. Currently on Airvo. CRRT running negative  On Levophed    5/11  BiPAP since yesterday with 80 was an FiO2. Unable to come off BiPAP. Worsening blood gas  Agreed for intubation.   Levophed at 20 mcg/min  CRRT being temporarily held    5/12  Continues to be on the vent 60% FiO2 8 of PEEP  Levophed at 9 mcg/min  CRRT restarted yesterday afternoon keeping even          Medications:  Reviewed    Infusion Medications    heparin (PORCINE) Infusion 1,050 Units/hr (05/12/22 1300)    vasopressin (Septic Shock) infusion Stopped (05/11/22 1327)    propofol Stopped (05/11/22 1919)    dextrose      prismaSATE BGK 4/2.5 500 mL/hr at 05/12/22 1103    prismaSATE BGK 4/2.5 500 mL/hr at 05/12/22 1103    prismaSATE BGK 4/2.5 500 mL/hr at 05/12/22 1102    norepinephrine 11 mcg/min (05/12/22 1305)    sodium chloride Stopped (05/10/22 2311)     Scheduled Medications    vancomycin  1,000 mg IntraVENous Once    collagenase   Topical Daily    sodium chloride  1,000 mL IntraVENous Once    tobramycin (PF)  300 mg Nebulization Q12H    vancomycin (VANCOCIN) intermittent dosing (placeholder)   Other RX Placeholder    cefTAZidime (FORTAZ) IV  1,000 mg IntraVENous 3 times per day    tigecycline (TYGACIL) IVPB  50 mg IntraVENous Q12H    epoetin bebo-epbx  3,000 Units SubCUTAneous Once per day on Mon Wed Fri    ipratropium-albuterol  1 ampule Inhalation Q4H While awake    insulin glargine  25 Units SubCUTAneous Nightly    sennosides-docusate sodium  2 tablet Oral BID    midodrine  10 mg Oral TID WC    insulin lispro  0-18 Units SubCUTAneous Q4H    pantoprazole  40 mg IntraVENous Daily    menthol-zinc oxide   Topical Daily    aspirin  81 mg Oral Nightly    sodium chloride flush  5-40 mL IntraVENous 2 times per day     PRN Meds: oxyCODONE-acetaminophen, heparin (porcine), heparin (porcine), traZODone, ipratropium-albuterol, dextrose bolus **OR** dextrose bolus, glucagon (rDNA), dextrose, potassium chloride, magnesium sulfate, calcium gluconate **OR** calcium gluconate **OR** calcium gluconate **OR** calcium gluconate, sodium phosphate IVPB **OR** sodium phosphate IVPB **OR** sodium phosphate IVPB **OR** sodium phosphate IVPB, glucose, midazolam, fentanNYL, sodium chloride flush, sodium chloride, ondansetron **OR** ondansetron, polyethylene glycol, acetaminophen **OR** acetaminophen, perflutren lipid microspheres      Intake/Output Summary (Last 24 hours) at 5/12/2022 1403  Last data filed at 5/12/2022 1300  Gross per 24 hour   Intake 3271.06 ml   Output 1495 ml   Net 1776.06 ml       Physical Exam Performed:    BP (!) 70/57   Pulse 96   Temp 97.2 °F (36.2 °C) (Bladder)   Resp 20   Ht 6' 2\" (1.88 m)   Wt 238 lb 4.8 oz (108.1 kg)   SpO2 98%   BMI 30.60 kg/m²       General appearance: Awake, intubated  HEENT: NAD,  Neck: Supple, . No jugular venous distention. Trachea midline. Respiratory: Diminished breath sounds bilaterally  Cardiovascular: S 1 s2 heard,  Right chest HD catheter and Port noted  Abdomen: Soft, non-tender, +distended with normal bowel sounds.   Colostomy noted in left LQ  Musculoskeletal: Right BKA with stump healthy. Superficial skin ulcers on right thigh with no active infection noted  Left LE edema with superficial ulceration noted   Skin: see Epic wound pictures, chronic exposed hardware in lower thoracic and lumbar region   Neurologic: Awake, intubated    paraplegic , atrophy of forearm and hand muscles   Fully awake  alert oriented    Labs:   Recent Labs     05/10/22  0901 05/11/22  0248 05/12/22  0320   WBC 5.7 10.2 19.3*   HGB 10.3* 10.0* 9.5*   HCT 32.2* 31.4* 30.8*    187 166     Recent Labs     05/11/22  2210 05/12/22  0320 05/12/22  0911   * 132* 131*   K 4.0 4.0 4.2   CL 98* 98* 96*   CO2 20* 22 22   BUN 10 11 10   CREATININE 0.8* 0.8* 0.6*   CALCIUM 7.7* 8.1* 8.3   PHOS 3.6 2.8 2.2*     No results for input(s): AST, ALT, BILIDIR, BILITOT, ALKPHOS in the last 72 hours. No results for input(s): INR in the last 72 hours. No results for input(s): Fernanda Lowers in the last 72 hours. Urinalysis:      Lab Results   Component Value Date    NITRU Negative 04/21/2022    WBCUA 21-50 04/21/2022    BACTERIA 4+ 04/21/2022    RBCUA  04/21/2022    BLOODU LARGE 04/21/2022    SPECGRAV >=1.030 04/21/2022    GLUCOSEU Negative 04/21/2022    GLUCOSEU >=1000 mg/dL 08/31/2010       Radiology:  XR CHEST PORTABLE   Final Result   1. Unchanged position of support devices. 2. Vascular congestion, basilar opacities and small pleural effusions are   again demonstrated and without significant change.          CT ABDOMEN PELVIS W IV CONTRAST Additional Contrast? None   Final Result   Increasing opacifications of the lung bases with small to moderate pleural   effusions right greater than left and adjacent opacifications which appear   greatest in the left lower lobe of worsening airspace disease and/or   atelectasis from prior comparison 04/21/2022      Similar appearance of small to moderate volume abdominopelvic ascites without   loculated or heterogeneous fluid collection otherwise evident greatest in the   perihepatic and perisplenic regions         XR CHEST PORTABLE   Final Result   1. Interval placement of endotracheal and orogastric tubes, in proper   positions. 2. Other support apparatus is stable and unchanged. 3. Stable small bilateral pleural effusions. 4. Progressive multifocal airspace opacities within the bilateral mid and   lower lung zones, likely a combination of progressive pulmonary edema and   multifocal pneumonia. XR CHEST PORTABLE   Final Result   Hypoinflated lungs with residual basilar opacities likely representing   atelectasis. The technique and hypoinflation will exaggerate the pulmonary vasculature. IR MIDLINE CATH   Final Result      XR CHEST PORTABLE   Final Result   Stable supportive devices. Increasing vascular congestion/mild pulmonary   edema. XR CHEST PORTABLE   Final Result   Low lung volumes with bibasilar atelectasis. Otherwise no acute process. XR CHEST PORTABLE   Final Result   Supportive tubing is in normal position. Stable left basilar opacity, atelectasis versus airspace disease. XR CHEST PORTABLE   Final Result   Slight improved aeration of the right lung. Bilateral pleuroparenchymal   disease remains         XR CHEST PORTABLE   Final Result   No significant change compared to chest x-ray from 05/01/2022. XR CHEST PORTABLE   Final Result   Low lung volumes with patchy airspace disease which may represent multifocal   atelectasis. Overall stable appearing chest.  Possible trace effusions. XR CHEST PORTABLE   Final Result   Relatively stable appearance of the chest with bibasilar airspace opacities. XR CHEST PORTABLE   Final Result   Low volume study with bilateral atelectasis or airspace disease, similar to   prior, with persistent small pleural effusions.          XR CHEST PORTABLE   Final Result   Stable chest.         XR CHEST PORTABLE   Final Result   Endotracheal tube tip projects at the mid intrathoracic trachea. Otherwise   stable chest.         XR CHEST PORTABLE   Final Result   Suboptimal examination due to patient rotation. The chest appears stable. XR CHEST PORTABLE   Final Result   Endotracheal tube tip projects at the thoracic inlet. Otherwise stable chest.         XR CHEST PORTABLE   Final Result   Stable chest.         XR CHEST PORTABLE   Final Result   Stable endotracheal tube located approximately 3.3 cm above the quintin. Low lung volumes. Suspected small right pleural effusion. XR CHEST PORTABLE   Final Result   Endotracheal tube in satisfactory position above the quintin      Bibasilar hypoaeration persist         CT ABDOMEN PELVIS W IV CONTRAST Additional Contrast? None   Final Result   1. Moderate amount of ascites with 3rd spacing including edematous changes   throughout the abdomen and anasarca. 2. Delgado in place. Diffuse bladder wall thickening. Correlate for   underlying cystitis. 3. No acute bowel pathology. Mild gastric distension. Diverticulosis with   no acute features. 4. Mild renal cortical scarring and asymmetric right renal atrophy, stable. No hydronephrosis. CT CHEST PULMONARY EMBOLISM W CONTRAST   Final Result   1. No evidence of acute pulmonary embolism. There is some thickening and   mild irregularity in the pulmonary arteries in the left lower lobe which may   represent chronic pulmonary embolism or secondary appearance to inflammation. No evidence of aortic aneurysm or dissection. 2. Moderate right effusion and right lower lobe atelectatic and/or   consolidative changes. Pneumonia is likely. There is severe loss of volume   in the right lung. Trace left effusion and left lower lobe atelectatic   changes are seen. 3. Moderate ascites around the spleen and liver. XR CHEST PORTABLE   Final Result   Low lung volumes. Bilateral pleural effusions with bibasilar volume loss,   right greater than left.   No overt failure. XR CHEST PORTABLE    (Results Pending)       Blood - 4/21 - Group A , strep pyogenes and Enterococcus Faecalis   Repeat blood - NG 4/23    Urine - Ecoli , Enterococcus Faecalis     Sputum- Acinetobacter baumannii    Assessment/Plan:    Septic shock. Enterococcus faecalis and strep pyogenes bacteremia. Right lower extremity cellulitis  Chronic pressure ulcers on the back with exposed hardware      Source could be HD line vs exposed hardware  Patient was on vancomycin, Merrem and clindamycin.  changed to  fortaz , steffany nebs for acinetobacter. Completed 14 days of IV Vanco. ID managing this. Received 1 dose of Tygacil 5/11  Severe hypotensive shock  needing sammi, vaso, dobutamine and levophed    on hydrocortisone for shock-->weaned off   Critical care managing  Improving hypotension, improved wbc, now only on levo --> levo dose had to be increased    End-stage renal disease on hemodialysis. Nephrology consulted  Initiated on CRRT   Fluid removal as tolerated , CRRT running neg   CRRT temporarily being held. CRRT restarted 5/11     Acute hypoxic resp failure-COVID extubated. On Airvo. Healthcare associated pneumonia. Intubated in the ICU on 4/22. Antibiotics as above. S/p bronch 5/1/22 given worsening leucocytosis/fever  cx with acinetobacter   Weaned off vent now  IV ceftaz and steffany nebs  And IV vanc  - he is agreeable with re intubation and tracheostomy if necessary. Currently on air Vo. Used BiPAP most of yesterday and all last night. Had to be reintubated 5/11 given worsening blood gas even on BiPAP. Acinetobacter in respiratory culture     Chronic systolic congestive heart failure, EF 25 to 30%. Ischemic CM/CAD s/p previous stents   Cardiology consulted  Hold home medications given hypotension  Off dobutamine   Considering to resume BB ,   Resume ASA , statins     Acute metabolic encephalopathy resolved. Secondary to septic shock  resolved     Type 2 diabetes.   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 Vivi Shaw     Paraplegia  Neurogenic bladder   - bed bound   - supportive care  - intermittent self catherizations , now has langley       Diet: Diet NPO Exceptions are: Ice Chips, Sips of Water with Meds  ADULT TUBE FEEDING; Orogastric; Renal Formula; Continuous; 35; No; 30; Q 4 hours; Protein; one proteinex P2Go TWICE daily via feeding tube  Code Status: Full Code    Dispo - cont care  updated family     Chris Kessler MD 5/12/2022 2:03 PM

## 2022-05-12 NOTE — PROGRESS NOTES
05/12/22 0259   Patient Observation   Pulse 89   Resp 20   SpO2 93 %   Breath Sounds   Right Upper Lobe Rhonchi   Right Middle Lobe Diminished   Right Lower Lobe Diminished   Left Upper Lobe Rhonchi   Left Lower Lobe Diminished   Airway Clearance   Suction ET Tube   Suction Device Inline suction catheter   Sputum Method Obtained Endotracheal   Sputum Amount Moderate   Sputum Color/Odor Other (comment)  (creamy,red)   Sputum Consistency Thick   Vent Information   Vent Mode AC/VC   $Ventilation $Subsequent Day   Vent Settings   FiO2  70 %   Resp Rate (Set) 20 bmp   Vt (Set, mL) 490 mL   PEEP/CPAP (cmH2O) 8   Trigger Sensitivity Flow (L/min) 3 L/min   Vent Patient Data (Readings)   Vt Exhaled 463 mL   Rate Measured 20 br/min   Minute Volume 8.64 Liters   Peak Flow 60 L/min   Pressure Support 0 cmH20   I:E Ratio 1:2.30   Sensitivity 3   Peak Inspiratory Pressure 35 cmH2O   Mean Airway Pressure 17 cmH20   High Peep/I Pressure 0   I Time/ I Time % 0 s   Plateau Pressure (cm H2O) 32 cm H2O   Vent Alarm Settings   High Pressure  45 cmH2O   Low Minute Volume Alarm 2.29 L/min   Ve Max 20   Ve Min 4   Vt Low  300 mL   RR High 0 br/min   Apnea (Secs) 20 secs   Ambu Bag With Mask At Bedside Yes   Additional Respiratory Assessments   Position Semi-Mejía's   Humidification Source Heated wire   Humidification Temp 37   Circuit Condensation Drained   ETT (adult)   Placement Date/Time: 05/11/22 1130   Preoxygenation: Yes  Airway Type: Cuffed  Airway Tube Size: 8 mm  Blade Size: 4  Location: Oral  Insertion attempts: 1  Secured At: 24 cm  Measured From: Lips   Secured At 26 cm   Measured From Lips   ETT Placement Center   Secured By Commercial tube simon   Site Assessment Dry

## 2022-05-12 NOTE — PROGRESS NOTES
Comprehensive Nutrition Assessment    Type and Reason for Visit:  Reassess    Nutrition Recommendations/Plan:   1. Re-start TF - ADULT TUBE FEEDING; Orogastric; Renal formula - Nepro with a goal rate of 35 ml/hr x 20 hours + water flushes of 30 ml every 4 hours + administer one proteinex P2Go TWICE daily via feeding tube. 2. 2. Monitor TF rate, intake, and tolerance + water flushes + administration of one proteinex P2Go TWICE daily. 3. 3. Monitor vent status, PEG/trach procedure status, and plan of care. 4. Monitor nutrition-related labs, ostomy output/function, and weight trends. Malnutrition Assessment:  Malnutrition Status:   At risk for malnutrition (05/12/22 1252)    Context:  Acute Illness     Findings of the 6 clinical characteristics of malnutrition:  Energy Intake:  Mild decrease in energy intake (no po intake and TF was stopped x 1-2 days)  Weight Loss:  Greater than 5% over 1 month (- 50# or 17.5% weight loss since 4/22/22)     Body Fat Loss:  No significant body fat loss Orbital   Muscle Mass Loss:  Unable to assess Temples (temporalis)  Fluid Accumulation:  Mild Extremities (BLE + 1 pitting edema)   Strength:  Not Performed    Nutrition Assessment:    patient has declined from a nutritional standpoint AEB patient was extubated but re-intubated on 5/11/22 + patient will tentatively have PEG and trach placed on 5/16/22 + TF was stopped on 5/11/22 for possible PEG placement today; patient remains at risk for further compromise d/t re-intubation on 5/11/22, need for trach/PEG placement early next week, need for EN as sole source of nutrition, and renal dysfunction + CRRT; will re-start TF today since patient is not having PEG placed today    Nutrition Related Findings:    patient is currently intubated (he was re-intubated on 5/11/22); propofol is off at this time; patient will likely have trach/PEG placement early next week (Monday-tentative); patient was agreeable to trach prior to being re-intubated; TF is okay to be re-started today; + CRRT continues with even fluid balance; per notes, patient will need a therapeutic paracentesis prior to PEG placement Wound Type: Multiple,Pressure Injury,Surgical Incision,Unstageable,Full Thickness,Partial Thickness (full thickness, trauma - pre-tib; unstageable left lower leg; full thickness, surgical - mid back; partial thickness skin loss - sacrum/buttocks)       Current Nutrition Intake & Therapies:    Average Meal Intake: NPO  Average Supplements Intake: NPO  Current Tube Feeding (TF) Orders:  · Feeding Route: Orogastric  · Formula: Renal Formula  · Schedule: Continuous  · Feeding Regimen: Nepro with a goal rate of 35 ml/hr x 20 hours  · Additives/Modulars: Protein (one proteinex P2Go TWICE daily via feeding tube)  · Water Flushes: 30 ml every 4 hours for tube patency  · Current TF & Flush Orders Provides: TF to be re-started via OG today since PEG placement will not be today  · Goal TF & Flush Orders Provides: Nepro with a goal rate of 35 ml/hr x 20 hours = 700 ml TV, 1260 kcals, 57 g protein, and 509 ml free water + 52 g protein and 208 kcals from one proteinex P2Go TWICE daily (109 g protein and 1468 kcals total) + 30 ml water flushes every 4 hours for tube patency      Anthropometric Measures:  Height: 6' 2\" (188 cm)  Ideal Body Weight (IBW): 190 lbs (86 kg)    Admission Body Weight: 288 lb 12.8 oz (131 kg) (obtained on 4/22/22; actual weight)  Current Body Weight: 238 lb 4.8 oz (108.1 kg) (obtained on 5/12/22; actual weight), 125.4 % IBW.  Weight Source: Bed Scale  Current BMI (kg/m2): 30.6  Usual Body Weight: 288 lb 12.8 oz (131 kg) (obtained on 4/22/22; actual weight)  % Weight Change (Calculated): -17.5  Weight Adjustment For: Paraplegia,Amputation  Total Adjusted Percentage (Calculated): 13.4  Adjusted Ideal Body Weight (lbs) (Calculated): 164.5 lbs  Adjusted Ideal Body Weight (kg) (Calculated): 74.77 kg  Adjusted % Ideal Body Weight (Calculated): 144.9  Adjusted BMI (kg/m2) (Calculated): 34.7  BMI Categories: Obese Class 1 (BMI 30.0-34. 9)    Estimated Daily Nutrient Needs:  Energy Requirements Based On: Kcal/kg  Weight Used for Energy Requirements: Current  Energy (kcal/day): 1155 - 1470 kcals based on 11-14 kcals/kg/CBW  Weight Used for Protein Requirements: Adjusted (adjusted IBW)  Protein (g/day): 135 - 150 kcals based on 1.8-2.0 g/kg/adjusted IBW (+ CRRT)  Method Used for Fluid Requirements: 1 ml/kcal  Fluid (ml/day): 1155 - 1470 ml    Nutrition Diagnosis:   · Inadequate oral intake related to inadequate protein-energy intake,impaired respiratory function,renal dysfunction,increase demand for energy/nutrients as evidenced by NPO or clear liquid status due to medical condition,intubation,nutrition support - enteral nutrition,wounds,weight loss,lab values,dialysis      Nutrition Interventions:   Food and/or Nutrient Delivery: Continue NPO,Start Tube Feeding  Nutrition Education/Counseling: No recommendation at this time  Coordination of Nutrition Care: Continue to monitor while inpatient,Interdisciplinary Rounds  Plan of Care discussed with: ICU Team    Goals:  Previous Goal Met: Progress towards Goal(s) Declining  Goals: Initiate nutrition support       Nutrition Monitoring and Evaluation:   Behavioral-Environmental Outcomes: None Identified  Food/Nutrient Intake Outcomes: Enteral Nutrition Intake/Tolerance  Physical Signs/Symptoms Outcomes: Biochemical Data,Fluid Status or Edema,Hemodynamic Status,Nutrition Focused Physical Findings,Skin,Weight    Discharge Planning:    Enteral Nutrition     Harjeet Valentine, [address] [place] [address],   Contact: [phone]

## 2022-05-13 NOTE — PROGRESS NOTES
RT Inhaler-Nebulizer Bronchodilator Protocol Note    There is a bronchodilator order in the chart from a provider indicating to follow the RT Bronchodilator Protocol and there is an Initiate RT Inhaler-Nebulizer Bronchodilator Protocol order as well (see protocol at bottom of note). CXR Findings:  XR CHEST PORTABLE    Result Date: 5/13/2022  Improving left lower lobe consolidation     XR CHEST PORTABLE    Result Date: 5/12/2022  1. Unchanged position of support devices. 2. Vascular congestion, basilar opacities and small pleural effusions are again demonstrated and without significant change. The findings from the last RT Protocol Assessment were as follows:   History Pulmonary Disease: Chronic pulmonary disease  Respiratory Pattern: Mild dyspnea at rest, irregular pattern, or RR 21-25 bpm  Breath Sounds: Inspiratory and expiratory or bilateral wheezing and/or rhonchi  Cough: Weak, productive  Indication for Bronchodilator Therapy: On home bronchodilators  Bronchodilator Assessment Score: 14    Aerosolized bronchodilator medication orders have been revised according to the RT Inhaler-Nebulizer Bronchodilator Protocol below. Respiratory Therapist to perform RT Therapy Protocol Assessment initially then follow the protocol. Repeat RT Therapy Protocol Assessment PRN for score 0-3 or on second treatment, BID, and PRN for scores above 3. No Indications - adjust the frequency to every 6 hours PRN wheezing or bronchospasm, if no treatments needed after 48 hours then discontinue using Per Protocol order mode. If indication present, adjust the RT bronchodilator orders based on the Bronchodilator Assessment Score as indicated below.   Use Inhaler orders unless patient has one or more of the following: on home nebulizer, not able to hold breath for 10 seconds, is not alert and oriented, cannot activate and use MDI correctly, or respiratory rate 25 breaths per minute or more, then use the equivalent nebulizer order(s) with same Frequency and PRN reasons based on the score. If a patient is on this medication at home then do not decrease Frequency below that used at home. 0-3 - enter or revise RT bronchodilator order(s) to equivalent RT Bronchodilator order with Frequency of every 4 hours PRN for wheezing or increased work of breathing using Per Protocol order mode. 4-6 - enter or revise RT Bronchodilator order(s) to two equivalent RT bronchodilator orders with one order with BID Frequency and one order with Frequency of every 4 hours PRN wheezing or increased work of breathing using Per Protocol order mode. 7-10 - enter or revise RT Bronchodilator order(s) to two equivalent RT bronchodilator orders with one order with TID Frequency and one order with Frequency of every 4 hours PRN wheezing or increased work of breathing using Per Protocol order mode. 11-13 - enter or revise RT Bronchodilator order(s) to one equivalent RT bronchodilator order with QID Frequency and an Albuterol order with Frequency of every 4 hours PRN wheezing or increased work of breathing using Per Protocol order mode. Greater than 13 - enter or revise RT Bronchodilator order(s) to one equivalent RT bronchodilator order with every 4 hours Frequency and an Albuterol order with Frequency of every 2 hours PRN wheezing or increased work of breathing using Per Protocol order mode.        Electronically signed by Alphonso Singh RCP on 5/13/2022 at 7:25 PM

## 2022-05-13 NOTE — PROGRESS NOTES
Low Dose Heparin Drip  Current rate: 1810 units/hr  [email or web address] 0.14  Goal Anti-Xa= 0.3-0.7  Per protocol, heparin 2000 unit bolus and increase drip to 2000 units/hr  Next Anti-Xa @ [address] 5/13/202211:00 AM  .

## 2022-05-13 NOTE — PROGRESS NOTES
Hospitalist Progress Note      PCP: Destiny Agee MD    Date of Admission: 4/21/2022    Subjective:     49 y.o. male with hx of ischemic CM, paraplegia, IDDM, ESRD on HD presents with bacteremia and decubitus ulcers, septic shock     Improving sepsis from last week, off sammi, vaso, dobutamine, now remains on levophed,      Ongoing CRRT with fluid removal   No fevers    Pt seen off vent this am, feels fine and reports weak cough  - having secretions that is requiring NT suctioning. He is on AirVO      5/8-he failed swallowing eval.  He used BiPAP last night. Mentation is normal.  On Airvo with FiO2 45%. 5/9  Failed swallow eval over the weekend  Has trouble clearing secretions  AirVo currently  and bipap all night. On CRRT  On Levo    5/10  On BiPAP for most of yesterday and all night. Currently on Airvo. CRRT running negative  On Levophed    5/11  BiPAP since yesterday with 80 was an FiO2. Unable to come off BiPAP. Worsening blood gas  Agreed for intubation.   Levophed at 20 mcg/min  CRRT being temporarily held    5/12  Continues to be on the vent 60% FiO2 8 of PEEP  Levophed at 9 mcg/min  CRRT restarted yesterday afternoon keeping even    5/13  Continues to require levophed  On CRRT       Medications:  Reviewed    Infusion Medications    heparin (PORCINE) Infusion 2,000 Units/hr (05/13/22 1132)    vasopressin (Septic Shock) infusion Stopped (05/11/22 1327)    propofol Stopped (05/11/22 1919)    dextrose      prismaSATE BGK 4/2.5 500 mL/hr at 05/12/22 2116    prismaSATE BGK 4/2.5 500 mL/hr at 05/12/22 2116    prismaSATE BGK 4/2.5 500 mL/hr at 05/12/22 2116    norepinephrine 10 mcg/min (05/13/22 1033)    sodium chloride Stopped (05/10/22 2311)     Scheduled Medications    insulin glargine  15 Units SubCUTAneous Nightly    collagenase   Topical Daily    sodium chloride  1,000 mL IntraVENous Once    tobramycin (PF)  300 mg Nebulization Q12H    cefTAZidime (FORTAZ) IV  1,000 mg IntraVENous 3 times per day    tigecycline (TYGACIL) IVPB  50 mg IntraVENous Q12H    epoetin bebo-epbx  3,000 Units SubCUTAneous Once per day on Mon Wed Fri    ipratropium-albuterol  1 ampule Inhalation Q4H While awake    sennosides-docusate sodium  2 tablet Oral BID    midodrine  10 mg Oral TID WC    insulin lispro  0-18 Units SubCUTAneous Q4H    pantoprazole  40 mg IntraVENous Daily    menthol-zinc oxide   Topical Daily    aspirin  81 mg Oral Nightly    sodium chloride flush  5-40 mL IntraVENous 2 times per day     PRN Meds: oxyCODONE-acetaminophen, heparin (porcine), heparin (porcine), traZODone, ipratropium-albuterol, dextrose bolus **OR** dextrose bolus, glucagon (rDNA), dextrose, potassium chloride, magnesium sulfate, calcium gluconate **OR** calcium gluconate **OR** calcium gluconate **OR** calcium gluconate, sodium phosphate IVPB **OR** sodium phosphate IVPB **OR** sodium phosphate IVPB **OR** sodium phosphate IVPB, glucose, midazolam, sodium chloride flush, sodium chloride, ondansetron **OR** ondansetron, polyethylene glycol, acetaminophen **OR** acetaminophen, perflutren lipid microspheres      Intake/Output Summary (Last 24 hours) at 5/13/2022 1343  Last data filed at 5/13/2022 1300  Gross per 24 hour   Intake 3105 ml   Output 3264 ml   Net -159 ml       Physical Exam Performed:    BP 97/72   Pulse 98   Temp 98.1 °F (36.7 °C) (Bladder)   Resp 16   Ht 6' 2\" (1.88 m)   Wt 233 lb (105.7 kg)   SpO2 95%   BMI 29.92 kg/m²       General appearance: Awake, intubated  HEENT: NAD,  Neck: Supple, . No jugular venous distention. Trachea midline. Respiratory: Diminished breath sounds bilaterally  Cardiovascular: S 1 s2 heard,  Right chest HD catheter and Port noted  Abdomen: Soft, non-tender, +distended with normal bowel sounds. Colostomy noted in left LQ  Musculoskeletal: Right BKA with stump healthy.  Superficial skin ulcers on right thigh with no active infection noted  Left LE edema with superficial ulceration noted   Skin: see Epic wound pictures, chronic exposed hardware in lower thoracic and lumbar region   Neurologic: Awake, intubated    paraplegic , atrophy of forearm and hand muscles   Fully awake  alert oriented    Labs:   Recent Labs     05/11/22  0248 05/12/22  0320 05/13/22  0253   WBC 10.2 19.3* 18.9*   HGB 10.0* 9.5* 9.6*   HCT 31.4* 30.8* 30.8*    166 153     Recent Labs     05/12/22 2122 05/13/22  0253 05/13/22  0914   * 130* 132*   K 3.8 3.7 4.1   CL 96* 95* 97*   CO2 23 22 23   BUN 13 16 16   CREATININE 0.7* 0.7* <0.5*   CALCIUM 8.4 8.6 8.8   PHOS 2.6 1.8* 1.9*     No results for input(s): AST, ALT, BILIDIR, BILITOT, ALKPHOS in the last 72 hours. No results for input(s): INR in the last 72 hours. No results for input(s): Connee Matar in the last 72 hours. Urinalysis:      Lab Results   Component Value Date    NITRU Negative 04/21/2022    WBCUA 21-50 04/21/2022    BACTERIA 4+ 04/21/2022    RBCUA  04/21/2022    BLOODU LARGE 04/21/2022    SPECGRAV >=1.030 04/21/2022    GLUCOSEU Negative 04/21/2022    GLUCOSEU >=1000 mg/dL 08/31/2010       Radiology:  XR CHEST PORTABLE   Final Result   Improving left lower lobe consolidation         XR CHEST PORTABLE   Final Result   1. Unchanged position of support devices. 2. Vascular congestion, basilar opacities and small pleural effusions are   again demonstrated and without significant change.          CT ABDOMEN PELVIS W IV CONTRAST Additional Contrast? None   Final Result   Increasing opacifications of the lung bases with small to moderate pleural   effusions right greater than left and adjacent opacifications which appear   greatest in the left lower lobe of worsening airspace disease and/or   atelectasis from prior comparison 04/21/2022      Similar appearance of small to moderate volume abdominopelvic ascites without   loculated or heterogeneous fluid collection otherwise evident greatest in the   perihepatic trachea. Otherwise   stable chest.         XR CHEST PORTABLE   Final Result   Suboptimal examination due to patient rotation. The chest appears stable. XR CHEST PORTABLE   Final Result   Endotracheal tube tip projects at the thoracic inlet. Otherwise stable chest.         XR CHEST PORTABLE   Final Result   Stable chest.         XR CHEST PORTABLE   Final Result   Stable endotracheal tube located approximately 3.3 cm above the quintin. Low lung volumes. Suspected small right pleural effusion. XR CHEST PORTABLE   Final Result   Endotracheal tube in satisfactory position above the quintin      Bibasilar hypoaeration persist         CT ABDOMEN PELVIS W IV CONTRAST Additional Contrast? None   Final Result   1. Moderate amount of ascites with 3rd spacing including edematous changes   throughout the abdomen and anasarca. 2. Delgado in place. Diffuse bladder wall thickening. Correlate for   underlying cystitis. 3. No acute bowel pathology. Mild gastric distension. Diverticulosis with   no acute features. 4. Mild renal cortical scarring and asymmetric right renal atrophy, stable. No hydronephrosis. CT CHEST PULMONARY EMBOLISM W CONTRAST   Final Result   1. No evidence of acute pulmonary embolism. There is some thickening and   mild irregularity in the pulmonary arteries in the left lower lobe which may   represent chronic pulmonary embolism or secondary appearance to inflammation. No evidence of aortic aneurysm or dissection. 2. Moderate right effusion and right lower lobe atelectatic and/or   consolidative changes. Pneumonia is likely. There is severe loss of volume   in the right lung. Trace left effusion and left lower lobe atelectatic   changes are seen. 3. Moderate ascites around the spleen and liver. XR CHEST PORTABLE   Final Result   Low lung volumes. Bilateral pleural effusions with bibasilar volume loss,   right greater than left. No overt failure. XR CHEST PORTABLE    (Results Pending)       Blood - 4/21 - Group A , strep pyogenes and Enterococcus Faecalis   Repeat blood - NG 4/23    Urine - Ecoli , Enterococcus Faecalis     Sputum- Acinetobacter baumannii    Assessment/Plan:    Septic shock. Enterococcus faecalis and strep pyogenes bacteremia. Right lower extremity cellulitis  Chronic pressure ulcers on the back with exposed hardware      Source could be HD line vs exposed hardware  Patient was on vancomycin, Merrem and clindamycin.  changed to  fortaz , tygacil and steffany nebs for acinetobacter. Completed 14 days of IV Vanco. ID managing this. Received 1 dose of Tygacil 5/11  Severe hypotensive shock  needing sammi, vaso, dobutamine and levophed    on hydrocortisone for shock-->weaned off   Critical care managing  Improving hypotension, improved wbc, now only on levo --> levo dose had to be increased    End-stage renal disease on hemodialysis. Nephrology consulted  Initiated on CRRT   Fluid removal as tolerated , CRRT running neg   CRRT temporarily being held. CRRT restarted 5/11     Acute hypoxic resp failure-COVID extubated. On Airvo. Healthcare associated pneumonia. Intubated in the ICU on 4/22. Antibiotics as above. S/p bronch 5/1/22 given worsening leucocytosis/fever  cx with acinetobacter   Weaned off vent now  IV ceftaz,tygacil  and steffany nebs   - he is agreeable with re intubation and tracheostomy if necessary. Currently on air Vo. Used BiPAP most of yesterday and all last night. Had to be reintubated 5/11 given worsening blood gas even on BiPAP. Possible trach and PEG early next week     Chronic systolic congestive heart failure, EF 25 to 30%. Ischemic CM/CAD s/p previous stents   Cardiology consulted  Hold home medications given hypotension  Off dobutamine   Considering to resume BB ,   Resume ASA , statins     Acute metabolic encephalopathy resolved. Secondary to septic shock  resolved     Type 2 diabetes.   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 Bipin Carroll     Paraplegia  Neurogenic bladder   - bed bound   - supportive care  - intermittent self catherizations , now has langley       Diet: Diet NPO Exceptions are: Ice Chips, Sips of Water with Meds  ADULT TUBE FEEDING; Orogastric; Renal Formula; Continuous; 35; No; 30; Q 4 hours; Protein; one proteinex P2Go TWICE daily via feeding tube  Code Status: Full Code  DVT prophylaxis-Heparin drip  Dispo - cont care  updated family     Christiano Watt MD 5/13/2022 1:43 PM

## 2022-05-13 NOTE — PROGRESS NOTES
RT Inhaler-Nebulizer Bronchodilator Protocol Note    There is a bronchodilator order in the chart from a provider indicating to follow the RT Bronchodilator Protocol and there is an Initiate RT Inhaler-Nebulizer Bronchodilator Protocol order as well (see protocol at bottom of note). CXR Findings:  XR CHEST PORTABLE    Result Date: 5/12/2022  1. Unchanged position of support devices. 2. Vascular congestion, basilar opacities and small pleural effusions are again demonstrated and without significant change. XR CHEST PORTABLE    Result Date: 5/11/2022  1. Interval placement of endotracheal and orogastric tubes, in proper positions. 2. Other support apparatus is stable and unchanged. 3. Stable small bilateral pleural effusions. 4. Progressive multifocal airspace opacities within the bilateral mid and lower lung zones, likely a combination of progressive pulmonary edema and multifocal pneumonia. The findings from the last RT Protocol Assessment were as follows:   History Pulmonary Disease: Chronic pulmonary disease  Respiratory Pattern: Mild dyspnea at rest, irregular pattern, or RR 21-25 bpm  Breath Sounds: Inspiratory and expiratory or bilateral wheezing and/or rhonchi  Cough: Weak, productive  Indication for Bronchodilator Therapy: On home bronchodilators  Bronchodilator Assessment Score: 14    Aerosolized bronchodilator medication orders have been revised according to the RT Inhaler-Nebulizer Bronchodilator Protocol below. Respiratory Therapist to perform RT Therapy Protocol Assessment initially then follow the protocol. Repeat RT Therapy Protocol Assessment PRN for score 0-3 or on second treatment, BID, and PRN for scores above 3. No Indications - adjust the frequency to every 6 hours PRN wheezing or bronchospasm, if no treatments needed after 48 hours then discontinue using Per Protocol order mode.      If indication present, adjust the RT bronchodilator orders based on the Bronchodilator Assessment Score as indicated below. Use Inhaler orders unless patient has one or more of the following: on home nebulizer, not able to hold breath for 10 seconds, is not alert and oriented, cannot activate and use MDI correctly, or respiratory rate 25 breaths per minute or more, then use the equivalent nebulizer order(s) with same Frequency and PRN reasons based on the score. If a patient is on this medication at home then do not decrease Frequency below that used at home. 0-3 - enter or revise RT bronchodilator order(s) to equivalent RT Bronchodilator order with Frequency of every 4 hours PRN for wheezing or increased work of breathing using Per Protocol order mode. 4-6 - enter or revise RT Bronchodilator order(s) to two equivalent RT bronchodilator orders with one order with BID Frequency and one order with Frequency of every 4 hours PRN wheezing or increased work of breathing using Per Protocol order mode. 7-10 - enter or revise RT Bronchodilator order(s) to two equivalent RT bronchodilator orders with one order with TID Frequency and one order with Frequency of every 4 hours PRN wheezing or increased work of breathing using Per Protocol order mode. 11-13 - enter or revise RT Bronchodilator order(s) to one equivalent RT bronchodilator order with QID Frequency and an Albuterol order with Frequency of every 4 hours PRN wheezing or increased work of breathing using Per Protocol order mode. Greater than 13 - enter or revise RT Bronchodilator order(s) to one equivalent RT bronchodilator order with every 4 hours Frequency and an Albuterol order with Frequency of every 2 hours PRN wheezing or increased work of breathing using Per Protocol order mode.          Electronically signed by Rosa Jade RCP on 5/12/2022 at 9:10 PM
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
## 2022-05-13 NOTE — CARE COORDINATION
INTERDISCIPLINARY PLAN OF CARE CONFERENCE    Date/Time: 5/13/2022 9:07 AM  Completed by: Rome Silveira RN, Case Management      Patient Name:  Samir Hunter  YOB: 1965  Admitting Diagnosis: Hyperkalemia [E87.5]  Hypoglycemia [E16.2]  ESRD on dialysis ([facility] [address] [N18.6, Z99.2]  Acute cystitis with hematuria [N30.01]  Septic shock ([facility] [address] [A41.9, R65.21]  Sepsis ([facility] [address] [A41.9]  Pneumonia due to infectious organism, unspecified laterality, unspecified part of lung [J18.9]     Admit Date/Time:  4/21/2022  1:21 PM    Chart reviewed. Interdisciplinary team contacted or reviewed plan related to patient progress and discharge plans. Disciplines included Case Management, Nursing, and Dietitian. Current Status:INPT, ICU, Vent  PT/OT recommendation for discharge plan of care: SNF    Expected D/C Disposition:  TBD- pt/family prior plan was to return home with c. Discharge Plan Comments: CM reviewed chart. Cont in ICU on vent and CRRT. Family at bedside. PT/OT recommends SNF. Pt/family prior plan is to return home at discharge. CM will cont to follow for discharge needs.      Home O2 in place on admit: Yes

## 2022-05-13 NOTE — PROGRESS NOTES
Low dose Heparin:  Anti-Xa at 1730 is 0.13IU/mL. Desired range is 0.3-0.7IU/mL. Increase the drip rate to 21.9mL/hr and give a 2,000 unit IV bolus dose. Repeat the Anti-Xa at 0100 5/14/22.   MATTHEW Hartley HARPER [facility] PharmD 5/13/2022 6:38 PM

## 2022-05-13 NOTE — PROGRESS NOTES
Pharmacy - Heparin  Anti-Xa level drawn at 0253 today = 0.06 IU/ml (Goal anti-Xa 0.3-0.7 IU/mL). Current heparin drip rate = 1430 units/hr. Per protocol, give heparin 4000 units IV bolus x 1 and increase heparin drip rate to 1810 units/hr. Draw Anti-Xa level 6 hours after bolus dose given and drip rate increased. Will adjust to goal anti-Xa 0.3-0.7 IU/mL. Pharmacy will continue to monitor and adjust as necessary.

## 2022-05-13 NOTE — PROGRESS NOTES
Pharmacy - Heparin  Anti-Xa level drawn at 1824 today = 0.04 IU/ml (Goal anti-Xa 0.3-0.7 IU/mL). Current heparin drip rate = 1050 units/hr. Per protocol, give heparin 4000 units IV bolus x 1 and increase heparin drip rate to 1430 units/hr. Draw Anti-Xa level 6 hours after bolus dose given and drip rate increased. Will adjust to goal anti-Xa 0.3-0.7 IU/mL. Pharmacy will continue to monitor and adjust as necessary.

## 2022-05-13 NOTE — PROGRESS NOTES
Dr. Yadiel Arias at bedside      -d/c PRN fentanyl  -decrease percocet  -send Gelacio Garcia RN, BSN
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
## 2022-05-13 NOTE — PROGRESS NOTES
05/12/22 2312   Patient Observation   Pulse 98   Resp 17   SpO2 94 %   Breath Sounds   Right Upper Lobe Rhonchi   Right Middle Lobe Diminished   Right Lower Lobe Diminished   Left Upper Lobe Rhonchi   Left Lower Lobe Diminished   Airway Clearance   Suction ET Tube   Sputum Color/Odor Other (comment)  (none)   Vent Information   Vent Mode AC/VC   Vent Settings   FiO2  50 %   Resp Rate (Set) 20 bmp   Vt (Set, mL) 490 mL   PEEP/CPAP (cmH2O) 8   Vent Patient Data (Readings)   Vt Exhaled 509 mL   Rate Measured 20 br/min   Minute Volume 10.2 Liters   Peak Flow 60 L/min   Pressure Support 0 cmH20   I:E Ratio 1:2.30   Sensitivity 3   Peak Inspiratory Pressure 29 cmH2O   Mean Airway Pressure 14 cmH20   High Peep/I Pressure 0   I Time/ I Time % 0 s   Plateau Pressure (cm H2O) 25 cm H2O   Vent Alarm Settings   High Pressure  45 cmH2O   Low Minute Volume Alarm 3 L/min   RR High 40 br/min   ETT (adult)   Placement Date/Time: 05/11/22 1130   Preoxygenation: Yes  Airway Type: Cuffed  Airway Tube Size: 8 mm  Blade Size: 4  Location: Oral  Insertion attempts: 1  Secured At: 24 cm  Measured From: Lips   ETT Placement Center
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
## 2022-05-13 NOTE — PROGRESS NOTES
05/13/22 0257   Patient Observation   Pulse 100   Resp 20   SpO2 95 %   Breath Sounds   Right Upper Lobe Rhonchi   Right Middle Lobe Diminished   Right Lower Lobe Diminished   Left Upper Lobe Rhonchi   Left Lower Lobe Diminished   Vent Information   Vent Mode AC/VC   Vent Settings   FiO2  50 %   Resp Rate (Set) 20 bmp   Vt (Set, mL) 490 mL   PEEP/CPAP (cmH2O) 8   Vent Patient Data (Readings)   Vt Exhaled 513 mL   Rate Measured 20 br/min   Minute Volume 10.3 Liters   Peak Flow 60 L/min   Pressure Support 0 cmH20   I:E Ratio 1:2.30   Sensitivity 3   Peak Inspiratory Pressure 29 cmH2O   Mean Airway Pressure 15 cmH20   High Peep/I Pressure 0   I Time/ I Time % 0 s   Plateau Pressure (cm H2O) 25 cm H2O   Vent Alarm Settings   High Pressure  45 cmH2O   Low Minute Volume Alarm 3 L/min   RR High 40 br/min

## 2022-05-13 NOTE — PROGRESS NOTES
CRRT alarming TMP, and filter pressure climbing. CRRT stopped, blood returned  (105ml) CRRT machine began alarming \"filter clotted\" & RN stopped returning remainder of blood. Report given to [facility] for transfer in pt care.   Corie Grimes RN, BSN

## 2022-05-13 NOTE — PROGRESS NOTES
Pt repositioned in bed x2 assist. Pt c/o pain- PRN percocet given. Pt resistant to frequent repositioning- educated on importance of.   Andre Worrell RN, BSN

## 2022-05-13 NOTE — PROGRESS NOTES
RT Inhaler-Nebulizer Bronchodilator Protocol Note    There is a bronchodilator order in the chart from a provider indicating to follow the RT Bronchodilator Protocol and there is an Initiate RT Inhaler-Nebulizer Bronchodilator Protocol order as well (see protocol at bottom of note). CXR Findings:  XR CHEST PORTABLE    Result Date: 5/13/2022  Improving left lower lobe consolidation     XR CHEST PORTABLE    Result Date: 5/12/2022  1. Unchanged position of support devices. 2. Vascular congestion, basilar opacities and small pleural effusions are again demonstrated and without significant change. XR CHEST PORTABLE    Result Date: 5/11/2022  1. Interval placement of endotracheal and orogastric tubes, in proper positions. 2. Other support apparatus is stable and unchanged. 3. Stable small bilateral pleural effusions. 4. Progressive multifocal airspace opacities within the bilateral mid and lower lung zones, likely a combination of progressive pulmonary edema and multifocal pneumonia. The findings from the last RT Protocol Assessment were as follows:   History Pulmonary Disease: (P) Chronic pulmonary disease  Respiratory Pattern: (P) Mild dyspnea at rest, irregular pattern, or RR 21-25 bpm  Breath Sounds: (P) Inspiratory and expiratory or bilateral wheezing and/or rhonchi  Cough: (P) Weak, productive  Indication for Bronchodilator Therapy: (P) On home bronchodilators  Bronchodilator Assessment Score: (P) 14    Aerosolized bronchodilator medication orders have been revised according to the RT Inhaler-Nebulizer Bronchodilator Protocol below. Respiratory Therapist to perform RT Therapy Protocol Assessment initially then follow the protocol. Repeat RT Therapy Protocol Assessment PRN for score 0-3 or on second treatment, BID, and PRN for scores above 3.     No Indications - adjust the frequency to every 6 hours PRN wheezing or bronchospasm, if no treatments needed after 48 hours then discontinue using Per Protocol order mode. If indication present, adjust the RT bronchodilator orders based on the Bronchodilator Assessment Score as indicated below. Use Inhaler orders unless patient has one or more of the following: on home nebulizer, not able to hold breath for 10 seconds, is not alert and oriented, cannot activate and use MDI correctly, or respiratory rate 25 breaths per minute or more, then use the equivalent nebulizer order(s) with same Frequency and PRN reasons based on the score. If a patient is on this medication at home then do not decrease Frequency below that used at home. 0-3 - enter or revise RT bronchodilator order(s) to equivalent RT Bronchodilator order with Frequency of every 4 hours PRN for wheezing or increased work of breathing using Per Protocol order mode. 4-6 - enter or revise RT Bronchodilator order(s) to two equivalent RT bronchodilator orders with one order with BID Frequency and one order with Frequency of every 4 hours PRN wheezing or increased work of breathing using Per Protocol order mode. 7-10 - enter or revise RT Bronchodilator order(s) to two equivalent RT bronchodilator orders with one order with TID Frequency and one order with Frequency of every 4 hours PRN wheezing or increased work of breathing using Per Protocol order mode. 11-13 - enter or revise RT Bronchodilator order(s) to one equivalent RT bronchodilator order with QID Frequency and an Albuterol order with Frequency of every 4 hours PRN wheezing or increased work of breathing using Per Protocol order mode. Greater than 13 - enter or revise RT Bronchodilator order(s) to one equivalent RT bronchodilator order with every 4 hours Frequency and an Albuterol order with Frequency of every 2 hours PRN wheezing or increased work of breathing using Per Protocol order mode. RT to enter RT Home Evaluation for COPD & MDI Assessment order using Per Protocol order mode.     Electronically signed by Anoop Crisostomo, LINDENP on 5/13/2022 at 11:22 AM

## 2022-05-13 NOTE — PROGRESS NOTES
Shift assessment completed, see flowsheet. SpO2 95% on AC/VC 50% and +8. Bilateral LS diminished with rhonchi. RASS -1, no continuous sedation but PRN pain meds in use. Levophed gtt infusing at 12 mcg/min to keep MAP > 65. Heparin gtt infusing at 10.5 ml/hr for [facility]. OG @ 60, TF restarted on previous shift. Delgado draining cloudy tea-colored urine, very low UO. Colostomy draining minimal amount of paste-like stool. Pt's mother, Guilherme Maria, updated. Changes overnight:  · BP continues to fluctuate erratically, making it hard to to simply keep even on CRRT at times. Frequent PRN pain medication dropping BP, only using IV fentanyl when percocet is not yet available. Pt c/o pain almost every time RN is in room. · Was not able to stop running positive on CRRT until 0200 d/t hypotension. · Anti-Xa low x2, 4000 unit bolus given twice. Heparin gtt now at 18.1 ml/hr. · Levophed as high as 30 mcg/min overnight. Currently at 13 mcg/min. Vaso gtt ordered from pharmacy, in room but never started. · 1 gram CaGluc x2 and 12 mmol of NaPhos administered per PRN orders. · WBC down from 19.3 to 18.9 this AM. Temp maintaining in the 98s while on CRRT without carlos hugger (blood warmer in use though). · HCO3 trending down x2 days, down to 19.7 this AM. pCO2 slightly low at 33.5, ABG WNL otherwise. · TF now at goal rate and pt tolerating. No other changes noted. EOS bedside report given to LIONEL Mosley. Pt stable at this time, care transferred.

## 2022-05-14 NOTE — PROGRESS NOTES
Dr Alva Gotti informed of 6 beat run VTach. Orders place for TSH, troponin, Echo, and cardiology consult. Dr Scott Campos also rounding and adds 500ml bolus.
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
## 2022-05-14 NOTE — PROGRESS NOTES
Hospitalist Progress Note      PCP: Mariela Byrd MD    Date of Admission: 4/21/2022    Subjective:     49 y.o. male with hx of ischemic CM, paraplegia, IDDM, ESRD on HD presents with bacteremia and decubitus ulcers, septic shock     Improving sepsis from last week, off sammi, vaso, dobutamine, now remains on levophed,      Ongoing CRRT with fluid removal   No fevers    5/11  Patient was on Airvo and BiPAP-> worsening hypoxemia-> intubated. Continued on Levophed   Continued on CRRT       5/14  Continues to require pressors  On CRRT   Remains on mechanical vent. FiO2 45%. Awake and alert.    Plan is for tracheostomy and PEG tube placement on Monday      Medications:  Reviewed    Infusion Medications    heparin (PORCINE) Infusion 2,570 Units/hr (05/14/22 1209)    vasopressin (Septic Shock) infusion 0.03 Units/min (05/14/22 1209)    propofol Stopped (05/11/22 1919)    dextrose      prismaSATE BGK 4/2.5 500 mL/hr at 05/14/22 0608    prismaSATE BGK 4/2.5 500 mL/hr at 05/14/22 0608    prismaSATE BGK 4/2.5 500 mL/hr at 05/14/22 0607    norepinephrine 13 mcg/min (05/14/22 1209)    sodium chloride Stopped (05/10/22 2311)     Scheduled Medications    sodium chloride  500 mL IntraVENous Once    insulin glargine  15 Units SubCUTAneous Nightly    colistimethate (COLY-MYCIN) nebulization  150 mg Nebulization Q8H    collagenase   Topical Daily    sodium chloride  1,000 mL IntraVENous Once    tigecycline (TYGACIL) IVPB  50 mg IntraVENous Q12H    epoetin bebo-epbx  3,000 Units SubCUTAneous Once per day on Mon Wed Fri    ipratropium-albuterol  1 ampule Inhalation Q4H While awake    sennosides-docusate sodium  2 tablet Oral BID    midodrine  10 mg Oral TID WC    insulin lispro  0-18 Units SubCUTAneous Q4H    pantoprazole  40 mg IntraVENous Daily    menthol-zinc oxide   Topical Daily    aspirin  81 mg Oral Nightly    sodium chloride flush  5-40 mL IntraVENous 2 times per day     PRN Meds: oxyCODONE-acetaminophen, heparin (porcine), heparin (porcine), traZODone, ipratropium-albuterol, dextrose bolus **OR** dextrose bolus, glucagon (rDNA), dextrose, potassium chloride, magnesium sulfate, calcium gluconate **OR** calcium gluconate **OR** calcium gluconate **OR** calcium gluconate, sodium phosphate IVPB **OR** sodium phosphate IVPB **OR** sodium phosphate IVPB **OR** sodium phosphate IVPB, glucose, midazolam, sodium chloride flush, sodium chloride, ondansetron **OR** ondansetron, polyethylene glycol, acetaminophen **OR** acetaminophen      Intake/Output Summary (Last 24 hours) at 5/14/2022 1648  Last data filed at 5/14/2022 1600  Gross per 24 hour   Intake 3423 ml   Output 3513 ml   Net -90 ml       Physical Exam Performed:    BP (!) 101/54   Pulse 94   Temp 98.8 °F (37.1 °C) (Bladder)   Resp 9   Ht 6' 2\" (1.88 m)   Wt 239 lb 12.8 oz (108.8 kg)   SpO2 95%   BMI 30.79 kg/m²       General appearance: Awake, intubated, on mechanical ventilation. Responding. O2 sats stable at FiO2 45%  Ongoing CRRT  HEENT: NAD,  Neck: Supple, . No jugular venous distention. Trachea midline. Respiratory: Diminished breath sounds bilaterally, bilateral crackles. Cardiovascular: S 1 s2 heard,  Right chest HD catheter and Port noted  Abdomen: Soft, non-tender, +distended with normal bowel sounds. Colostomy noted in left LQ  Musculoskeletal: Right BKA with stump healthy.  Superficial skin ulcers on right thigh with no active infection noted  Left LE edema with superficial ulceration noted   Skin: see Epic wound pictures, chronic exposed hardware in lower thoracic and lumbar region   Neurologic: Awake, intubated  paraplegic , atrophy of forearm and hand muscles   Fully awake  alert oriented    Labs:   Recent Labs     05/12/22  0320 05/13/22  0253 05/14/22  0346   WBC 19.3* 18.9* 13.0*   HGB 9.5* 9.6* 9.2*   HCT 30.8* 30.8* 28.8*    153 110*     Recent Labs     05/14/22  0346 05/14/22  0902 05/14/22  1556   NA 129* 130* 131*   K 3.7 3.8 3.9   CL 95* 95* 97*   CO2 26 24 26   BUN 19 20 19   CREATININE 0.7* 0.7* 0.7*   CALCIUM 8.5 8.5 8.5   PHOS 2.5 2.2* 2.4*     No results for input(s): AST, ALT, BILIDIR, BILITOT, ALKPHOS in the last 72 hours. No results for input(s): INR in the last 72 hours. Recent Labs     05/14/22  1015   TROPONINI 0.05*       Urinalysis:      Lab Results   Component Value Date    NITRU Negative 04/21/2022    WBCUA 21-50 04/21/2022    BACTERIA 4+ 04/21/2022    RBCUA  04/21/2022    BLOODU LARGE 04/21/2022    SPECGRAV >=1.030 04/21/2022    GLUCOSEU Negative 04/21/2022    GLUCOSEU >=1000 mg/dL 08/31/2010       Radiology:  XR CHEST PORTABLE   Final Result   Persistent pleural-parenchymal disease at the lung bases, similar to prior. XR CHEST PORTABLE   Final Result   Improving left lower lobe consolidation         XR CHEST PORTABLE   Final Result   1. Unchanged position of support devices. 2. Vascular congestion, basilar opacities and small pleural effusions are   again demonstrated and without significant change. CT ABDOMEN PELVIS W IV CONTRAST Additional Contrast? None   Final Result   Increasing opacifications of the lung bases with small to moderate pleural   effusions right greater than left and adjacent opacifications which appear   greatest in the left lower lobe of worsening airspace disease and/or   atelectasis from prior comparison 04/21/2022      Similar appearance of small to moderate volume abdominopelvic ascites without   loculated or heterogeneous fluid collection otherwise evident greatest in the   perihepatic and perisplenic regions         XR CHEST PORTABLE   Final Result   1. Interval placement of endotracheal and orogastric tubes, in proper   positions. 2. Other support apparatus is stable and unchanged. 3. Stable small bilateral pleural effusions.    4. Progressive multifocal airspace opacities within the bilateral mid and   lower lung zones, likely a combination of progressive pulmonary edema and   multifocal pneumonia. XR CHEST PORTABLE   Final Result   Hypoinflated lungs with residual basilar opacities likely representing   atelectasis. The technique and hypoinflation will exaggerate the pulmonary vasculature. IR MIDLINE CATH   Final Result      XR CHEST PORTABLE   Final Result   Stable supportive devices. Increasing vascular congestion/mild pulmonary   edema. XR CHEST PORTABLE   Final Result   Low lung volumes with bibasilar atelectasis. Otherwise no acute process. XR CHEST PORTABLE   Final Result   Supportive tubing is in normal position. Stable left basilar opacity, atelectasis versus airspace disease. XR CHEST PORTABLE   Final Result   Slight improved aeration of the right lung. Bilateral pleuroparenchymal   disease remains         XR CHEST PORTABLE   Final Result   No significant change compared to chest x-ray from 05/01/2022. XR CHEST PORTABLE   Final Result   Low lung volumes with patchy airspace disease which may represent multifocal   atelectasis. Overall stable appearing chest.  Possible trace effusions. XR CHEST PORTABLE   Final Result   Relatively stable appearance of the chest with bibasilar airspace opacities. XR CHEST PORTABLE   Final Result   Low volume study with bilateral atelectasis or airspace disease, similar to   prior, with persistent small pleural effusions. XR CHEST PORTABLE   Final Result   Stable chest.         XR CHEST PORTABLE   Final Result   Endotracheal tube tip projects at the mid intrathoracic trachea. Otherwise   stable chest.         XR CHEST PORTABLE   Final Result   Suboptimal examination due to patient rotation. The chest appears stable. XR CHEST PORTABLE   Final Result   Endotracheal tube tip projects at the thoracic inlet.   Otherwise stable chest.         XR CHEST PORTABLE   Final Result   Stable chest.         XR CHEST PORTABLE   Final Result   Stable endotracheal tube located approximately 3.3 cm above the quintin. Low lung volumes. Suspected small right pleural effusion. XR CHEST PORTABLE   Final Result   Endotracheal tube in satisfactory position above the quintin      Bibasilar hypoaeration persist         CT ABDOMEN PELVIS W IV CONTRAST Additional Contrast? None   Final Result   1. Moderate amount of ascites with 3rd spacing including edematous changes   throughout the abdomen and anasarca. 2. Delgado in place. Diffuse bladder wall thickening. Correlate for   underlying cystitis. 3. No acute bowel pathology. Mild gastric distension. Diverticulosis with   no acute features. 4. Mild renal cortical scarring and asymmetric right renal atrophy, stable. No hydronephrosis. CT CHEST PULMONARY EMBOLISM W CONTRAST   Final Result   1. No evidence of acute pulmonary embolism. There is some thickening and   mild irregularity in the pulmonary arteries in the left lower lobe which may   represent chronic pulmonary embolism or secondary appearance to inflammation. No evidence of aortic aneurysm or dissection. 2. Moderate right effusion and right lower lobe atelectatic and/or   consolidative changes. Pneumonia is likely. There is severe loss of volume   in the right lung. Trace left effusion and left lower lobe atelectatic   changes are seen. 3. Moderate ascites around the spleen and liver. XR CHEST PORTABLE   Final Result   Low lung volumes. Bilateral pleural effusions with bibasilar volume loss,   right greater than left. No overt failure. XR CHEST PORTABLE    (Results Pending)     Cultures  Blood - 4/21 - Group A , strep pyogenes and Enterococcus Faecalis   Repeat blood - NG 4/23  Urine - Ecoli , Enterococcus Faecalis   Sputum- Acinetobacter baumannii        Assessment/Plan:    Septic shock. Enterococcus faecalis and strep pyogenes bacteremia.   Right lower extremity cellulitis  Chronic pressure ulcers on the back with exposed hardware      Source could be HD line vs exposed hardware  Patient was on vancomycin, Merrem and clindamycin.  changed to  fortaz , tygacil and steffany nebs for acinetobacter. Completed 14 days of IV Vanco. ID managing this. Received 1 dose of Tygacil 5/11  Severe hypotensive shock  needing sammi, vaso, dobutamine and levophed    on hydrocortisone for shock-->weaned off   Critical care managing  Improving hypotension, improved wbc, now only on levo --> levo dose had to be increased    End-stage renal disease on hemodialysis. Nephrology consulted  Ongoing CRRT-fluid removal as tolerated     Acute hypoxic resp failure  On mechanical ventilation. 4/22-5/6; reintubated on 5/11/2022  Healthcare associated pneumonia.  -Intubated in the ICU on 4/22-> extubated 5/6. On Airvo and BiPAP  -Antibiotics as above. -S/p bronch 5/1/22 given worsening leucocytosis/fever  -cx with acinetobacter IV ceftaz,tygacil  and steffany nebs  -> Reintubated on 5/11/22 for worsening hypoxic hypercapnic respiratory failure  Plan is for tracheostomy and PEG tube placement on Monday       Chronic systolic congestive heart failure, EF 25 to 30%. Ischemic CM/CAD s/p previous stents   Cardiology consulted  Hold home medications given hypotension  Off dobutamine   Considering to resume BB ,   Resume ASA , statins     Acute metabolic encephalopathy resolved. Secondary to septic shock  resolved     Type 2 diabetes.   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 Diamond Arrberna    Paraplegia  Neurogenic bladder    bed bound ,supportive care  - intermittent self catherizations , now has langley       Diet: Diet NPO Exceptions are: Ice Chips, Sips of Water with Meds  ADULT TUBE FEEDING; Orogastric; Renal Formula; Continuous; 35; No; 30; Q 4 hours; Protein; one proteinex P2Go TWICE daily via feeding tube  Code Status: Full Code  DVT prophylaxis-Heparin drip  Dispo - cont care  updated family     Timothy Lea MD 5/14/2022 4:48 PM

## 2022-05-14 NOTE — PROGRESS NOTES
RT Inhaler-Nebulizer Bronchodilator Protocol Note    There is a bronchodilator order in the chart from a provider indicating to follow the RT Bronchodilator Protocol and there is an Initiate RT Inhaler-Nebulizer Bronchodilator Protocol order as well (see protocol at bottom of note). CXR Findings:  XR CHEST PORTABLE    Result Date: 5/13/2022  Improving left lower lobe consolidation       The findings from the last RT Protocol Assessment were as follows:   History Pulmonary Disease: (P) Chronic pulmonary disease  Respiratory Pattern: (P) Regular pattern and RR 12-20 bpm  Breath Sounds: (P) Slightly diminished and/or crackles  Cough: (P) Strong, productive  Indication for Bronchodilator Therapy: (P) On home bronchodilators  Bronchodilator Assessment Score: (P) 5    Aerosolized bronchodilator medication orders have been revised according to the RT Inhaler-Nebulizer Bronchodilator Protocol below. Respiratory Therapist to perform RT Therapy Protocol Assessment initially then follow the protocol. Repeat RT Therapy Protocol Assessment PRN for score 0-3 or on second treatment, BID, and PRN for scores above 3. No Indications - adjust the frequency to every 6 hours PRN wheezing or bronchospasm, if no treatments needed after 48 hours then discontinue using Per Protocol order mode. If indication present, adjust the RT bronchodilator orders based on the Bronchodilator Assessment Score as indicated below. Use Inhaler orders unless patient has one or more of the following: on home nebulizer, not able to hold breath for 10 seconds, is not alert and oriented, cannot activate and use MDI correctly, or respiratory rate 25 breaths per minute or more, then use the equivalent nebulizer order(s) with same Frequency and PRN reasons based on the score. If a patient is on this medication at home then do not decrease Frequency below that used at home.     0-3 - enter or revise RT bronchodilator order(s) to equivalent RT Bronchodilator order with Frequency of every 4 hours PRN for wheezing or increased work of breathing using Per Protocol order mode. 4-6 - enter or revise RT Bronchodilator order(s) to two equivalent RT bronchodilator orders with one order with BID Frequency and one order with Frequency of every 4 hours PRN wheezing or increased work of breathing using Per Protocol order mode. 7-10 - enter or revise RT Bronchodilator order(s) to two equivalent RT bronchodilator orders with one order with TID Frequency and one order with Frequency of every 4 hours PRN wheezing or increased work of breathing using Per Protocol order mode. 11-13 - enter or revise RT Bronchodilator order(s) to one equivalent RT bronchodilator order with QID Frequency and an Albuterol order with Frequency of every 4 hours PRN wheezing or increased work of breathing using Per Protocol order mode. Greater than 13 - enter or revise RT Bronchodilator order(s) to one equivalent RT bronchodilator order with every 4 hours Frequency and an Albuterol order with Frequency of every 2 hours PRN wheezing or increased work of breathing using Per Protocol order mode.          Electronically signed by Domingo Penny RCP on 5/14/2022 at 7:24 AM

## 2022-05-14 NOTE — PROGRESS NOTES
Low Dose Heparin  Anti-Xa (18:24) = 0.17. Give Heparin bolus of 2000 units and increase Heparin infusion to 2760 units/hr. Next Anti-XA will be scheduled for six hours after rate adjustment.   Lauren Raza R.Ph.5/14/20227:11 PM
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
## 2022-05-14 NOTE — PROGRESS NOTES
EOS bedside report given to [facility], RN. Levophed as high as 20 mcg/min overnight. Vasopressin started, BP more stable with addition of vaso gtt. CRRT filter change completed at 2130 using sterile technique. Filter clotted on previous shift. Ran smoothly this shift. PRN percocet administered x1. Pt much more lethargic. Still refusing most repositioning. CHG bath and wound care completed. Replaced iCa and Phos. WBC trending down. ABG WNL other than slightly low pO2 of 72. Levophed 13 mcg/min. Vaso 0.03 units/min. Heparin 2380 units/hr. No other changes noted overnight. Patient stable at this time, care transferred.

## 2022-05-14 NOTE — PROGRESS NOTES
Low dose heparin:  Anti-Xa at 0900 is 0. 17Iu/mL. Desired range is 0.3-0.7. Give a 2,000 unit IV Bolus dose and increase the infusion to 25. 7mL/hr. Check an Anti-Xa at 1800.   MATTHEW Mercado [facility] PharmD 5/14/2022 9:30 AM

## 2022-05-14 NOTE — PROGRESS NOTES
05/13/22 2325   NICU Vent Information   Vent Type 980   Vent Mode AC/VC   Vt (Set, mL) 490 mL   Vt Exhaled 498 mL   Resp Rate (Set) 16 bmp   Rate Measured 20 br/min   Minute Volume 10.3 Liters   Peak Flow 60 L/min   Pressure Support 0 cmH20   FiO2  40 %   Peak Inspiratory Pressure 29 cmH2O   I:E Ratio 1:3.10   Sensitivity 3   High Peep/I Pressure 0   PEEP/CPAP (cmH2O) 5   I Time/ I Time % 0 s   Mean Airway Pressure 12 cmH20   Cough/Sputum   Sputum How Obtained Suctioned;Endotracheal   Sputum Amount None   Additional Respiratory Assessments   Pulse 100   Resp 23   SpO2 94 %   Vent Alarm Settings   High Pressure  45 cmH2O   Low Minute Volume Alarm 3 L/min   RR High 40 br/min   ETT (adult)   Placement Date/Time: 05/11/22 1130   Preoxygenation: Yes  Airway Type: Cuffed  Airway Tube Size: 8 mm  Blade Size: 4  Location: Oral  Insertion attempts: 1  Secured At: 24 cm  Measured From: Lips   ETT Placement Center

## 2022-05-14 NOTE — PROGRESS NOTES
Low dose Heparin:  Anti-Xa at 2358 is 0.17 IU/mL. Desired range is 0.3-0.7IU/mL. Increase the drip rate to 23.8 mL/hr and give a 2,000 unit IV bolus dose. Repeat the Anti-Xa at 0800 5/14/22.

## 2022-05-14 NOTE — PROGRESS NOTES
05/14/22 1921   NICU Vent Information   Vent Type 980   Vent Mode AC/VC   Vt (Set, mL) 490 mL   Vt Exhaled 507 mL   Resp Rate (Set) 16 bmp   Rate Measured 16 br/min   Minute Volume 8.12 Liters   Peak Flow 60 L/min   Pressure Support 0 cmH20   FiO2  45 %   Peak Inspiratory Pressure 30 cmH2O   I:E Ratio 1:3.20   Sensitivity 3   High Peep/I Pressure 0   PEEP/CPAP (cmH2O) 5   I Time/ I Time % 0 s   Mean Airway Pressure 11 cmH20   Plateau Pressure 22 BIV37   Static Compliance 30 mL/cmH2O   Dynamic Compliance 20 mL/cmH2O   Cough/Sputum   Sputum How Obtained Endotracheal;Suctioned   Sputum Amount Moderate   Sputum Color Creamy   Tenacity Thick   Breath Sounds   Right Upper Lobe Diminished   Right Middle Lobe Diminished   Right Lower Lobe Diminished   Left Upper Lobe Diminished   Left Lower Lobe Diminished   Additional Respiratory Assessments   Pulse 92   Resp 16   SpO2 96 %   Position Semi-Mejía's   Vent Alarm Settings   High Pressure  45 cmH2O   Low Minute Volume Alarm 3 L/min   RR High 40 br/min   ETT (adult)   Placement Date/Time: 05/11/22 1130   Preoxygenation: Yes  Airway Type: Cuffed  Airway Tube Size: 8 mm  Blade Size: 4  Location: Oral  Insertion attempts: 1  Secured At: 24 cm  Measured From: Lips   Secured At 26 cm   Measured From Lips   ETT Placement Center   Secured By Commercial tube simon   Site Assessment Dry

## 2022-05-14 NOTE — PROGRESS NOTES
Pt alert this morning, asking for pain medication. Pt indicated pain is in his back in the upper back area.
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
## 2022-05-14 NOTE — PROGRESS NOTES
RT Inhaler-Nebulizer Bronchodilator Protocol Note    There is a bronchodilator order in the chart from a provider indicating to follow the RT Bronchodilator Protocol and there is an Initiate RT Inhaler-Nebulizer Bronchodilator Protocol order as well (see protocol at bottom of note). CXR Findings:  XR CHEST PORTABLE    Result Date: 5/14/2022  Persistent pleural-parenchymal disease at the lung bases, similar to prior. XR CHEST PORTABLE    Result Date: 5/13/2022  Improving left lower lobe consolidation       The findings from the last RT Protocol Assessment were as follows:   History Pulmonary Disease: Chronic pulmonary disease  Respiratory Pattern: Regular pattern and RR 12-20 bpm  Breath Sounds: Slightly diminished and/or crackles  Cough: Strong, productive  Indication for Bronchodilator Therapy: On home bronchodilators  Bronchodilator Assessment Score: 5    Aerosolized bronchodilator medication orders have been revised according to the RT Inhaler-Nebulizer Bronchodilator Protocol below. Respiratory Therapist to perform RT Therapy Protocol Assessment initially then follow the protocol. Repeat RT Therapy Protocol Assessment PRN for score 0-3 or on second treatment, BID, and PRN for scores above 3. No Indications - adjust the frequency to every 6 hours PRN wheezing or bronchospasm, if no treatments needed after 48 hours then discontinue using Per Protocol order mode. If indication present, adjust the RT bronchodilator orders based on the Bronchodilator Assessment Score as indicated below. Use Inhaler orders unless patient has one or more of the following: on home nebulizer, not able to hold breath for 10 seconds, is not alert and oriented, cannot activate and use MDI correctly, or respiratory rate 25 breaths per minute or more, then use the equivalent nebulizer order(s) with same Frequency and PRN reasons based on the score.   If a patient is on this medication at home then do not decrease Frequency below that used at home. 0-3 - enter or revise RT bronchodilator order(s) to equivalent RT Bronchodilator order with Frequency of every 4 hours PRN for wheezing or increased work of breathing using Per Protocol order mode. 4-6 - enter or revise RT Bronchodilator order(s) to two equivalent RT bronchodilator orders with one order with BID Frequency and one order with Frequency of every 4 hours PRN wheezing or increased work of breathing using Per Protocol order mode. 7-10 - enter or revise RT Bronchodilator order(s) to two equivalent RT bronchodilator orders with one order with TID Frequency and one order with Frequency of every 4 hours PRN wheezing or increased work of breathing using Per Protocol order mode. 11-13 - enter or revise RT Bronchodilator order(s) to one equivalent RT bronchodilator order with QID Frequency and an Albuterol order with Frequency of every 4 hours PRN wheezing or increased work of breathing using Per Protocol order mode. Greater than 13 - enter or revise RT Bronchodilator order(s) to one equivalent RT bronchodilator order with every 4 hours Frequency and an Albuterol order with Frequency of every 2 hours PRN wheezing or increased work of breathing using Per Protocol order mode.          Electronically signed by Ai Mcdonald RCP on 5/14/2022 at 7:31 PM
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
## 2022-05-15 NOTE — PROGRESS NOTES
Patient continually peak pressuring ventilator, requiring frequent suctioning by this RN and RT, and increase in o2 requirements, PRN Versed given

## 2022-05-15 NOTE — PROGRESS NOTES
"Medical Nutrition Therapy (MNT) General Bariatric Post harjeet #5 no charge, biometric no charge  9/2/20  Beginning Time: 12:10 PM     End Time: 1:00 PM  MNT Provider Order:Post bariatric    Others Present: Alone  Procedure Completed: Sleeve on 8/13/19    Nutrition Assessment  Food/Nutrition - Related History- Pt states she feels so much better since surgery, hard to describe, \"well  being\" maybe, but has lots of back problems  Previous MNT/Date: 6/18/20, 3/5/20, 11/7/19, 8/29/19, 8/8/19 and 4 previous IBT appt  Pertinent Medications:   Current Outpatient Medications   Medication Sig Dispense Refill   • atenoloL (TENORMIN) 25 mg tablet TAKE 1 TABLET BY MOUTH  DAILY 90 tablet 3   • losartan (COZAAR) 50 mg tablet TAKE 1 TABLET BY MOUTH  DAILY 90 tablet 3   • furosemide (LASIX) 20 mg tablet TAKE 1 TABLET BY MOUTH  DAILY 90 tablet 3   • potassium chloride 10 mEq CR tablet TAKE 1 TABLET BY MOUTH  DAILY 90 tablet 3   • omeprazole (PriLOSEC) 20 mg capsule Take 1 capsule (20 mg total) by mouth daily 90 capsule 3   • levothyroxine (SYNTHROID, LEVOTHROID) 100 mcg tablet TAKE 1 TABLET BY MOUTH  DAILY 90 tablet 3   • atorvastatin (LIPITOR) 20 mg tablet TAKE 1 TABLET BY MOUTH  DAILY 90 tablet 0   • NON FORMULARY SWISH AND SPIT 5 ML PO Q 4 H PRN FOR PAINFUL SORES OF THE MOUTH.     • Antacid Regular Strength 200-200-20 mg/5 mL suspension      • ferrous sulfate 325 mg (65 mg iron) tablet Take 1 tablet (325 mg total) by mouth every other day 15 tablet 11   • gabapentin (NEURONTIN) 300 mg capsule Take 300 mg by mouth Indications: Taking 900mg TID       • calcium carbonate-vitamin D3 600 mg(1,500mg) -400 unit per tablet Take 1 tablet by mouth daily     • nitroglycerin (NITROSTAT) 0.4 mg SL tablet Place 1 tablet (0.4 mg total) under the tongue every 5 (five) minutes as needed for chest pain 25 tablet 1   • vit A-vit C-vit E-zinc-copper (EYE VITAMIN AND MINERALS) 7,160-113-100 unit-mg-unit tablet Take 1 tab/cap by mouth 2 (two) times a " Reassessment complete. Patient continues intubated, wakes to new voice. Physical assessment unchanged from previous. Patient refuses repositioning at this time. Left soft wrist restraint remains in place. day.     • PREMARIN 0.9 mg tablet TAKE 1 TABLET BY MOUTH  EVERY DAY (Patient taking differently: TAKE 1 TABLET BY MOUTH EVERY WEEK) 90 tablet 1     Current Facility-Administered Medications   Medication Dose Route Frequency Provider Last Rate Last Dose   • denosumab (PROLIA) 60 mg/mL syringe 60 mg  60 mg subcutaneous q6 months Haley Olvera DNP   60 mg at 09/02/20 1201     Supplements/Herbals: Prenatal, Ca with Vit D, Vit D and Vit 12  Alcohol Use:   Social History     Substance and Sexual Activity   Alcohol Use No      Catholic / Cultural / Ethnic Food Practices:  None  Physical Activity: Pt is walking [id number] steps per day using walker, walking around [place] visit; back is so sore-she hopes to get back to get an injection       Food Recall: Breakfast: cereal, milk or sweet roll with coffee; noon: yakelin's burger or taco anika's taco; evening meal: greek yogurt, applesauce; snacks: goldfish or apple sliced; beverage: Decaf Green iced tea, water, 2 cups decaf coffee    Client History  Medical History:  KTAY, Pulmonary HTN, GERD, Obesity, Vit D def, Hypothyroidism, back pain    Occupation: back to work over the past week  Socioeconomic Concerns:  No  Shops/Cooks for Self: No    Anthropometrics  Ht Readings from Last 1 Encounters:   05/30/20 1.524 m (5')   Wt. 166.6# on body comp scale with 2# clothing wt subtracted on 9/2/20, Wt. was:170.8# on body comp scale with 2# clothing wt subtracted, wt was: 176.2# on body comp scale with 2.6# clothing wt subtracted on 3/5/20, wt was: 179.8# with 2.6# clothing wt subtracted on body comp scale on 11/7/19, Wt. 188.4# on 8/29 with 1# clothing wt subtracted on the [place] Body Comp scale down from 198.8# on 8/8 on the body comp scale  Wt Readings from Last 1 Encounters:   07/29/20 78.5 kg (173 lb)    BMI: 33.6  BMI Readings from Last 1 Encounters:   07/29/20 33.79 kg/m²      BP Readings from Last 1 Encounters:   07/29/20 124/60              Pre-op Post 3wk Post 6mo Post 9 mo  "Post 12 mo   Waist Measurement:  8/8/19  41.75\"  8/29/19  41\" 11/7/19  40\"   6/18/20  38.75\" 9/2/20  37.4\"    Hip Measurement:  53\"  51.75\"  50.5\"  47.75\" 47.5\"    Neck Measurement:  16\"  15.75\"  15.5\"  14.5\" 14.5\"     Patient reports usual body weight (UBW) of: Patient reports usual body weight (UBW) of: no usual  Patient has experienced previous weight loss history: Weight Watchers, Nutri System, Atkins  Had lap band in 4083-6249 and was removed by Dr. Martinez in Fall in 2018; highest wt was 211#, lowest wt 165#; has gained at least 15# since Nov.   Body Composition: Refer to attached body comp results  Weight History:Wt was: 202.6# on 7/18, wt was: 211.4# on 7/5,  Wt was: 203.6# on 6/6, wt was 202.2# for 5/10,  Wt was: 203# on 5/2 with initial IBT appt          Biochemical Data, Medical Tests, and Procedures  Pertinent Labs:   Sodium   Date Value Ref Range Status   08/10/2020 140 135 - 145 mmol/L Final     Potassium   Date Value Ref Range Status   08/10/2020 4.0 3.5 - 5.1 mmol/L Final     Chloride   Date Value Ref Range Status   08/10/2020 109 (H) 98 - 107 mmol/L Final     CO2   Date Value Ref Range Status   08/10/2020 26 21 - 32 mmol/L Final     BUN   Date Value Ref Range Status   08/10/2020 20 7 - 25 mg/dL Final     Creatinine   Date Value Ref Range Status   08/10/2020 0.93 0.60 - 1.10 mg/dL Final     Glucose   Date Value Ref Range Status   08/10/2020 97 70 - 105 mg/dL Final     Calcium   Date Value Ref Range Status   08/10/2020 9.0 8.6 - 10.3 mg/dL Final     Cholesterol   Date Value Ref Range Status   08/10/2020 142 0 - 199 mg/dL Final     Triglycerides   Date Value Ref Range Status   08/10/2020 138 <=149 mg/dL Final     HDL   Date Value Ref Range Status   08/10/2020 41 >=40 mg/dL Final       Social / Diet History  Current Diet Stage: 12 month post surgery  Allergies/Intolerances: ALLERGIES: STRAWBERRIES CAUSES HIVES,TONGUE SWELLING; dislikes eggs and yogurt    Allergies   Allergen Reactions   • Strawberry " Angioedema and Hives     hives, tongue swells  Swelling of tongue   • Adhesive      welts         Nutrition Prescription  12 month Post op Bariatric Sleeve surgery Diet: Smaller portion, High protein, reduced fat, reduced sugar diet with micronutrient supplementation        Nutrition Diagnosis   Problem #1: Altered GI function  Related to: recent bariatric surgery  As evidenced by: inability to consume a regular diet- need for increased protein, soft moist foods and small portions and increased nutrition supplementation of micronutrients     Problem #2: Obesity Class 1  Related to: hx of energy consumption exceeding energy expenditure  As evidenced by: BMI of 32.54    Pain/GI Concerns/Bowel Problems:  Pain: 0 when sitting; 5 when standing and walking  GI Concerns: has heartburn; has been taking Omeprazole over the counter, extremely tired  Bowel Problems: no problems    Intervention/Plan:    Bariatric Diet Discussion: Body comp analysis shows a loss of 4.2# over the past 3 months. She has lost about 3.4# of body fat and 0.8# of muscle mass and has maintained her bone mass. Pt is pleased with wt loss but knows she needs to improve her diet.States she has less back pain but appetite is decreased d/t  being placed in nursing home in [place]. She is sad that she doesn't get to see him very often and lonely in her apt. Discussed quick and easy meal ideas. Discussed the 12month post op diet. Emphasized protein sources and increasing physical activity. Reviewed vitamins/minerals needed for life. Handouts provided: 12 month after post surgery.  Goals  Post Op Phase  Attends all post-op visits through 18 months?    Date:  8/29/19 Date: 11/7/19  Date:  3/5/20   3wks xx 3mos xx 6mos xx   Date:  6/18/20 Date:  9/2/20 Date:     9mos xx 12mos xx 18mos              Reports 60-80g protein per day? Yes  Reports 64 oz zero calorie fluid per day? Yes  Reports taking supplements? Yes  Reports routinely spending 150mins per week  in physical   activity process? Yes     Evaluation  Receptivity to education: good    Patient states understanding of the information presented.    Follow-up Plan  F/U scheduled per Bariatric Coordinator at 18 months  Time Spent with Patient: 50 minutes

## 2022-05-15 NOTE — PROGRESS NOTES
RT Inhaler-Nebulizer Bronchodilator Protocol Note    There is a bronchodilator order in the chart from a provider indicating to follow the RT Bronchodilator Protocol and there is an Initiate RT Inhaler-Nebulizer Bronchodilator Protocol order as well (see protocol at bottom of note). CXR Findings:  XR CHEST PORTABLE    Result Date: 5/15/2022  Persisting left lower lobe airspace disease with volume loss or collapse. Mucous plugging considered. Improving aeration right lower lobe. XR CHEST PORTABLE    Result Date: 5/14/2022  Persistent pleural-parenchymal disease at the lung bases, similar to prior. The findings from the last RT Protocol Assessment were as follows:   History Pulmonary Disease: (P) Chronic pulmonary disease  Respiratory Pattern: (P) Regular pattern and RR 12-20 bpm  Breath Sounds: (P) Slightly diminished and/or crackles  Cough: (P) Weak, productive  Indication for Bronchodilator Therapy: (P) On home bronchodilators  Bronchodilator Assessment Score: (P) 6    Aerosolized bronchodilator medication orders have been revised according to the RT Inhaler-Nebulizer Bronchodilator Protocol below. Respiratory Therapist to perform RT Therapy Protocol Assessment initially then follow the protocol. Repeat RT Therapy Protocol Assessment PRN for score 0-3 or on second treatment, BID, and PRN for scores above 3. No Indications - adjust the frequency to every 6 hours PRN wheezing or bronchospasm, if no treatments needed after 48 hours then discontinue using Per Protocol order mode. If indication present, adjust the RT bronchodilator orders based on the Bronchodilator Assessment Score as indicated below.   Use Inhaler orders unless patient has one or more of the following: on home nebulizer, not able to hold breath for 10 seconds, is not alert and oriented, cannot activate and use MDI correctly, or respiratory rate 25 breaths per minute or more, then use the equivalent nebulizer order(s) with same Frequency and PRN reasons based on the score. If a patient is on this medication at home then do not decrease Frequency below that used at home. 0-3 - enter or revise RT bronchodilator order(s) to equivalent RT Bronchodilator order with Frequency of every 4 hours PRN for wheezing or increased work of breathing using Per Protocol order mode. 4-6 - enter or revise RT Bronchodilator order(s) to two equivalent RT bronchodilator orders with one order with BID Frequency and one order with Frequency of every 4 hours PRN wheezing or increased work of breathing using Per Protocol order mode. 7-10 - enter or revise RT Bronchodilator order(s) to two equivalent RT bronchodilator orders with one order with TID Frequency and one order with Frequency of every 4 hours PRN wheezing or increased work of breathing using Per Protocol order mode. 11-13 - enter or revise RT Bronchodilator order(s) to one equivalent RT bronchodilator order with QID Frequency and an Albuterol order with Frequency of every 4 hours PRN wheezing or increased work of breathing using Per Protocol order mode. Greater than 13 - enter or revise RT Bronchodilator order(s) to one equivalent RT bronchodilator order with every 4 hours Frequency and an Albuterol order with Frequency of every 2 hours PRN wheezing or increased work of breathing using Per Protocol order mode.          Electronically signed by James Galvez RCP on 5/15/2022 at 9:10 AM

## 2022-05-15 NOTE — PROGRESS NOTES
Morning assessment complete. Patient seen intubated, patient wakes to new voice and follows commands. Patient intubated with a # 8.0 ETT at the 26 LL. Ventilator settings currently [facility] 16/490/55/8. Patient with no s/s of distress noted at this time. Physical assessment as charted in flow sheets. Scheduled medications given per orders. LEVOPHED infusing at a rate of 11 mcg/min. VASOPRESSIN infusing at 0.03 units/min. Heparin infusing at 29.5 mL/hr. Central line dressing is clean, dry and intact with no signs of drainage. All tubing is current and dated. IPA caps applied to all access hubs. Delgado intact and secure, britney urine draining. Stoma pink and moist, ostomy bag in place. Patient not wanting to be repositioned at this time.

## 2022-05-15 NOTE — PROGRESS NOTES
Hospitalist Progress Note      PCP: Lesley Minor MD    Date of Admission: 4/21/2022    Subjective:     49 y.o. male with hx of ischemic CM, paraplegia, IDDM, ESRD on HD presents with bacteremia and decubitus ulcers, septic shock     Improving sepsis from last week, off sammi, vaso, dobutamine, now remains on levophed,      Ongoing CRRT with fluid removal   No fevers    5/11  Patient was on Airvo and BiPAP-> worsening hypoxemia-> intubated. Continued on Levophed   Continued on CRRT     5/14  Continues to require pressors  On CRRT   Remains on mechanical vent. FiO2 45%. Awake and alert. Plan is for tracheostomy and PEG tube placement on Monday      5/15  Worsening hypoxemia today with FiO2 up to 60% and PEEP up to 8, s/p bronchoscopy with aspiration of mucus plugs . Currently FiO2 is at 50% PEEP is at 8 .       Medications:  Reviewed    Infusion Medications    heparin (PORCINE) Infusion 2,950 Units/hr (05/15/22 1200)    vasopressin (Septic Shock) infusion 0.03 Units/min (05/15/22 1200)    propofol Stopped (05/11/22 1919)    dextrose      prismaSATE BGK 4/2.5 500 mL/hr at 05/15/22 0416    prismaSATE BGK 4/2.5 500 mL/hr at 05/15/22 0415    prismaSATE BGK 4/2.5 500 mL/hr at 05/15/22 0415    norepinephrine 6 mcg/min (05/15/22 1252)    sodium chloride Stopped (05/10/22 2311)     Scheduled Medications    sodium chloride  500 mL IntraVENous Once    insulin glargine  15 Units SubCUTAneous Nightly    colistimethate (COLY-MYCIN) nebulization  150 mg Nebulization Q8H    collagenase   Topical Daily    sodium chloride  1,000 mL IntraVENous Once    tigecycline (TYGACIL) IVPB  50 mg IntraVENous Q12H    epoetin bebo-epbx  3,000 Units SubCUTAneous Once per day on Mon Wed Fri    ipratropium-albuterol  1 ampule Inhalation Q4H While awake    sennosides-docusate sodium  2 tablet Oral BID    midodrine  10 mg Oral TID WC    insulin lispro  0-18 Units SubCUTAneous Q4H    pantoprazole  40 mg IntraVENous Daily WBC 18.9* 13.0* 10.5   HGB 9.6* 9.2* 9.1*   HCT 30.8* 28.8* 28.4*    110* 89*     Recent Labs     05/14/22  2121 05/15/22  0320 05/15/22  0909   * 131* 130*   K 3.9 4.2 3.6   CL 97* 97* 96*   CO2 25 25 25   BUN 21* 21* 19   CREATININE 0.7* 0.7* 0.7*   CALCIUM 8.8 8.4 8.4   PHOS 2.6 2.1* 2.3*     No results for input(s): AST, ALT, BILIDIR, BILITOT, ALKPHOS in the last 72 hours. No results for input(s): INR in the last 72 hours. Recent Labs     05/14/22  1015   TROPONINI 0.05*       Urinalysis:      Lab Results   Component Value Date    NITRU Negative 04/21/2022    WBCUA 21-50 04/21/2022    BACTERIA 4+ 04/21/2022    RBCUA  04/21/2022    BLOODU LARGE 04/21/2022    SPECGRAV >=1.030 04/21/2022    GLUCOSEU Negative 04/21/2022    GLUCOSEU >=1000 mg/dL 08/31/2010       Radiology:  XR CHEST PORTABLE   Final Result   Persisting left lower lobe airspace disease with volume loss or collapse. Mucous plugging considered. Improving aeration right lower lobe. XR CHEST PORTABLE   Final Result   Persistent pleural-parenchymal disease at the lung bases, similar to prior. XR CHEST PORTABLE   Final Result   Improving left lower lobe consolidation         XR CHEST PORTABLE   Final Result   1. Unchanged position of support devices. 2. Vascular congestion, basilar opacities and small pleural effusions are   again demonstrated and without significant change.          CT ABDOMEN PELVIS W IV CONTRAST Additional Contrast? None   Final Result   Increasing opacifications of the lung bases with small to moderate pleural   effusions right greater than left and adjacent opacifications which appear   greatest in the left lower lobe of worsening airspace disease and/or   atelectasis from prior comparison 04/21/2022      Similar appearance of small to moderate volume abdominopelvic ascites without   loculated or heterogeneous fluid collection otherwise evident greatest in the   perihepatic and trachea. Otherwise   stable chest.         XR CHEST PORTABLE   Final Result   Suboptimal examination due to patient rotation. The chest appears stable. XR CHEST PORTABLE   Final Result   Endotracheal tube tip projects at the thoracic inlet. Otherwise stable chest.         XR CHEST PORTABLE   Final Result   Stable chest.         XR CHEST PORTABLE   Final Result   Stable endotracheal tube located approximately 3.3 cm above the quintin. Low lung volumes. Suspected small right pleural effusion. XR CHEST PORTABLE   Final Result   Endotracheal tube in satisfactory position above the quintin      Bibasilar hypoaeration persist         CT ABDOMEN PELVIS W IV CONTRAST Additional Contrast? None   Final Result   1. Moderate amount of ascites with 3rd spacing including edematous changes   throughout the abdomen and anasarca. 2. Delgado in place. Diffuse bladder wall thickening. Correlate for   underlying cystitis. 3. No acute bowel pathology. Mild gastric distension. Diverticulosis with   no acute features. 4. Mild renal cortical scarring and asymmetric right renal atrophy, stable. No hydronephrosis. CT CHEST PULMONARY EMBOLISM W CONTRAST   Final Result   1. No evidence of acute pulmonary embolism. There is some thickening and   mild irregularity in the pulmonary arteries in the left lower lobe which may   represent chronic pulmonary embolism or secondary appearance to inflammation. No evidence of aortic aneurysm or dissection. 2. Moderate right effusion and right lower lobe atelectatic and/or   consolidative changes. Pneumonia is likely. There is severe loss of volume   in the right lung. Trace left effusion and left lower lobe atelectatic   changes are seen. 3. Moderate ascites around the spleen and liver. XR CHEST PORTABLE   Final Result   Low lung volumes. Bilateral pleural effusions with bibasilar volume loss,   right greater than left. No overt failure. XR CHEST PORTABLE    (Results Pending)   US GUIDED PARACENTESIS    (Results Pending)     Cultures  Blood - 4/21 - Group A , strep pyogenes and Enterococcus Faecalis   Repeat blood - NG 4/23  Urine - Ecoli , Enterococcus Faecalis   Sputum- Acinetobacter baumannii        Assessment/Plan:    Septic shock. Enterococcus faecalis and strep pyogenes bacteremia. Right lower extremity cellulitis  Chronic pressure ulcers on the back with exposed hardware   Source could be HD line vs exposed hardware  Patient was on vancomycin, Merrem and clindamycin.  changed to  fortaz , tygacil and steffany nebs for acinetobacter. Completed 14 days of IV Vanco. ID managing this. Received 1 dose of Tygacil 5/11  Severe hypotensive shock  needing sammi, vaso, dobutamine and levophed    on hydrocortisone for shock-->weaned off   Critical care managing  Improving hypotension, improved wbc  -Currently on Levophed and vasopressin. End-stage renal disease on hemodialysis. Nephrology consulted  Ongoing CRRT-fluid removal as tolerated     Acute hypoxic resp failure  On mechanical ventilation. 4/22-5/6; reintubated on 5/11/2022  Healthcare associated pneumonia.  -Intubated in the ICU on 4/22-> extubated 5/6. On Airvo and BiPAP  -Antibiotics as above. -S/p bronch 5/1/22 given worsening leucocytosis/fever  -cx with acinetobacter IV ceftaz,tygacil  and stfefany nebs  -> Reintubated on 5/11/22 for worsening hypoxic hypercapnic respiratory failure  -> S/P bronch 5/15   Plan is for tracheostomy and PEG tube placement on Monday       Chronic systolic congestive heart failure, EF 25 to 30%. Ischemic CM/CAD s/p previous stents   Cardiology consulted  Hold home medications given hypotension  Off dobutamine   Considering to resume BB ,   Resume ASA , statins     Acute metabolic encephalopathy resolved. Secondary to septic shock  resolved     Type 2 diabetes.   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 Bipin Carroll    Paraplegia  Neurogenic bladder    bed bound ,supportive care  - intermittent self catherizations , now has langley       Diet: Diet NPO Exceptions are: Ice Chips, Sips of Water with Meds  ADULT TUBE FEEDING; Orogastric; Renal Formula; Continuous; 35; No; 30; Q 4 hours; Protein; one proteinex P2Go TWICE daily via feeding tube  Code Status: Full Code  DVT prophylaxis-Heparin drip  Dispo - cont care  updated family     Jina Watt MD 5/15/2022 12:59 PM

## 2022-05-15 NOTE — PROGRESS NOTES
Assisted M.D. with bronchoscopy. Pt placed on PCV 15 and 100% Fio2 during procedure. Pt. essie well and without complication. Returned to previous settings after procedure.

## 2022-05-15 NOTE — PROGRESS NOTES
05/14/22 2310   NICU Vent Information   Vent Type 980   Vent Mode AC/VC   Vt (Set, mL) 490 mL   Vt Exhaled 564 mL   Resp Rate (Set) 16 bmp   Rate Measured 17 br/min   Minute Volume 8.8 Liters   Peak Flow 60 L/min   Pressure Support 0 cmH20   FiO2  45 %   Peak Inspiratory Pressure 32 cmH2O   I:E Ratio 1:3.20   Sensitivity 3   High Peep/I Pressure 0   PEEP/CPAP (cmH2O) 5   I Time/ I Time % 0 s   Mean Airway Pressure 12 cmH20   Cough/Sputum   Sputum How Obtained Suctioned;Endotracheal   Sputum Amount None   Breath Sounds   Right Upper Lobe Rhonchi   Right Middle Lobe Diminished   Right Lower Lobe Diminished   Left Upper Lobe Rhonchi   Left Lower Lobe Diminished   Additional Respiratory Assessments   Pulse 93   Resp 17   SpO2 96 %   Vent Alarm Settings   High Pressure  45 cmH2O   Low Minute Volume Alarm 3 L/min   RR High 40 br/min   ETT (adult)   Placement Date/Time: 05/11/22 1130   Preoxygenation: Yes  Airway Type: Cuffed  Airway Tube Size: 8 mm  Blade Size: 4  Location: Oral  Insertion attempts: 1  Secured At: 24 cm  Measured From: Lips   ETT Placement Right

## 2022-05-15 NOTE — PROGRESS NOTES
Care rounds complete with Dr. Riaz Velarde. Discussed patient's increased o2 requirements and peak pressuring ventilator, PRN fentanyl added, try to use PRN's for sedation - will contact Dr. Riaz Velarde if not adequate. Dr. Jaiden Alberto at bedside, continue to keep patient even, or +50/hr if increase in pressor requirements.

## 2022-05-15 NOTE — PROGRESS NOTES
05/15/22 1919   NICU Vent Information   Vent Type 980   Vent Mode AC/VC   Vt (Set, mL) 490 mL   Vt Exhaled 481 mL   Resp Rate (Set) 16 bmp   Rate Measured 17 br/min   Minute Volume 8.69 Liters   Peak Flow 60 L/min   Pressure Support 0 cmH20   FiO2  40 %   Peak Inspiratory Pressure 25 cmH2O   I:E Ratio 1:2.60   Sensitivity 3   High Peep/I Pressure 0   PEEP/CPAP (cmH2O) 5   I Time/ I Time % 0 s   Mean Airway Pressure 11 cmH20   Plateau Pressure 20 XCH18   Static Compliance 32 mL/cmH2O   Dynamic Compliance 24 mL/cmH2O   Breath Sounds   Right Upper Lobe Diminished   Right Middle Lobe Diminished   Right Lower Lobe Diminished   Left Upper Lobe Diminished   Left Lower Lobe Diminished   Additional Respiratory Assessments   Pulse 90   Resp 17   SpO2 96 %   Position Semi-Mejía's   Vent Alarm Settings   High Pressure  50 cmH2O   Low Minute Volume Alarm 3 L/min   RR High 40 br/min   ETT (adult)   Placement Date/Time: 05/11/22 1130   Preoxygenation: Yes  Airway Type: Cuffed  Airway Tube Size: 8 mm  Blade Size: 4  Location: Oral  Insertion attempts: 1  Secured At: 24 cm  Measured From: Lips   Secured At 26 cm   Measured From Lips   ETT Placement Center   Secured By Commercial tube simon   Site Assessment Dry
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
## 2022-05-15 NOTE — PROCEDURES
PROCEDURE: Therapeutic bronchoscopy    PRE-PROCEDURE EVALUATION:  Nursing History and Physical reviewed and agreed upon     Allergies and medications have been reviewed    HENT: ETT in place. Patient is intubated not able to assess Mallampati  Cardiovascular: Normal rate, regular rhythm, normal heart sounds. Pulmonary/Chest: No wheezes. Bilateral rhonchi. No rales. Abdominal: Soft. Bowel sounds are normal. No distension. ASA CLASS    IV. Severe Systemic Disease which is a threat to life. Sedation plan:   Continue ICU sedation    Post Procedure Plan   Continue ICU care     The risks and benefits as well as alternatives to the procedure have been discussed with the patient's family. The patient's next of kin understands and agrees to proceed. DESCRIPTION OF PROCEDURE: A time out was taken. ICU sedation continued. The scope was passed with ease via the endotracheal tube. There was some narrowing at the site of ET tube simon and pressure was released. A complete airway inspection was performed. No evidence of endobronchial lesion. Large thin purulent secretions throughout the bilateral lower lobe airways, worse in lower lobes with mucous plugs. Saline injection followed by therapeutic aspiration was performed. Washings were obtained throughout the airways. The patient tolerated the procedure well. Estimated blood loss was none. Recovery will be per endoscopy protocol.     FOLLOW UP: Continue ICU care

## 2022-05-15 NOTE — PROGRESS NOTES
EOS bedside report given to Jena Mijares RN. · CRRT alarming access pressure pod error and continued failing self test even after RN intervention. Filter change completed at 2200 and running smoothly afterwards. · Levophed titrated throughout the night. Started the shift on 13 mcg/min, currently at 11 mcg/min. Vaso continues at 0.03 units/min,  · Heparin gtt increased again, at 2950 units/hr. · PRN percocet administered for pain, and PRN versed utilized when pt c/o pain but percocet not yet available to help pt go back to sleep. BP tolerating versed much better than fentanyl. PRN trazodone also administered before bedtime to aid in pt's comfort while being on ventilator w/o sedation. · CHG bath and wound care completed. No other pertinent changes noted overnight. Pt stable at this time, care transferred.

## 2022-05-15 NOTE — PROGRESS NOTES
Low Dose Heparin  Anti-Xa (15:35) = 0.33. Therapeutic. Continue Heparin infusion at 2950 units/hr. This is the second consecutive therapeutic Anti-Xa. Anti-Xa now scheduled daily.   JORGE CorreaPh.5/15/39280:28 PM

## 2022-05-15 NOTE — PROGRESS NOTES
Reassessment complete. Patient seen resting in bed with eyes closed. Lung sounds improved from previous assessment, rhonchi improved. Remaining physical assessment unchanged at this time. Patient repositioned for comfort. Soft wrist restraints to L wrist remains in place for patient safety. Patient's family at bedside. LEVOPHED infusing at a rate of 12 mcg/min. VASOPRESSIN infusing at 0.03 units/min  Heparin infusing at 29.5 mL/hr.

## 2022-05-15 NOTE — PROGRESS NOTES
Patient continually reaching up with left hand to mouth, resting hand on ETT, soft wrist restraint applied to left wrist for patient safety and for the safety of ETT. Patient's daughter at bedside.

## 2022-05-15 NOTE — PROGRESS NOTES
Patient with blood pressures > 200, as given in report patient occasionally will have high blood pressure but with subsequently drop back down low. Levophed cut in half and vaso left on.

## 2022-05-15 NOTE — PROGRESS NOTES
Low Dose Heparin  Anti-Xa (0211) = 0.26. Give Heparin bolus of 2000 units and increase Heparin infusion to 2950 units/hr.   Next Anti-XA in 6 hours

## 2022-05-16 NOTE — PROGRESS NOTES
Patient resting in bed, intubated, no sedation. Left wrist restraint in place to remind patient not to pull at ETT tube, patient able to move arm if needed, a/o, nods yes/no appropriately and mouths words well. Tube feeding infusing through OG tube. Patient has langley catheter with scant output, ostomy intact with scant stool noted. On CRRT with goal to keep I/O even. On levophed and vasopressin for pressure support. Heparin drip to infuse until midnight tonight then on hold for 0900 tracheostomy placement, PEG tube placement, and paracentesis. Patient and family aware of procedures and times, consent to be signed in am.  Will continue to monitor.
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
## 2022-05-16 NOTE — PROGRESS NOTES
Dialysis, access lines switched due to high access pressure, lines flushed easily, no resistance. RT draws ABG, denies needs.

## 2022-05-16 NOTE — PROGRESS NOTES
RT Nebulizer Bronchodilator Protocol Note    There is a bronchodilator order in the chart from a provider indicating to follow the RT Bronchodilator Protocol and there is an Initiate RT Bronchodilator Protocol order as well (see protocol at bottom of note). CXR Findings:  XR CHEST PORTABLE    Result Date: 5/16/2022  Endotracheal tube remains approximately 3 cm above the quintin. Poor inspiration with basilar airspace disease and possible effusion primarily on the left. XR CHEST PORTABLE    Result Date: 5/15/2022  Persisting left lower lobe airspace disease with volume loss or collapse. Mucous plugging considered. Improving aeration right lower lobe. The findings from the last RT Protocol Assessment were as follows:  Smoking: (P) Chronic pulmonary disease  Respiratory Pattern: (P) Regular pattern and RR 12-20 bpm  Breath Sounds: (P) Slightly diminished and/or crackles  Cough: (P) Unable to generate effective cough  Indication for Bronchodilator Therapy: On home bronchodilators  Bronchodilator Assessment Score: (P) 8    Aerosolized bronchodilator medication orders have been revised according to the RT Nebulizer Bronchodilator Protocol below. Respiratory Therapist to perform RT Therapy Protocol Assessment initially then follow the protocol. Repeat RT Therapy Protocol Assessment PRN for score 0-3 or on second treatment, BID, and PRN for scores above 3. No Indications - adjust the frequency to every 6 hours PRN wheezing or bronchospasm, if no treatments needed after 48 hours then discontinue using Per Protocol order mode. If indication present, adjust the RT bronchodilator orders based on the Bronchodilator Assessment Score as indicated below. If a patient is on this medication at home then do not decrease Frequency below that used at home.     0-3 - enter or revise RT bronchodilator order(s) to equivalent RT Bronchodilator order with Frequency of every 4 hours PRN for wheezing or increased work of breathing using Per Protocol order mode. 4-6 - enter or revise RT Bronchodilator order(s) to two equivalent RT bronchodilator orders with one order with BID Frequency and one order with Frequency of every 4 hours PRN wheezing or increased work of breathing using Per Protocol order mode. 7-10 - enter or revise RT Bronchodilator order(s) to two equivalent RT bronchodilator orders with one order with TID Frequency and one order with Frequency of every 4 hours PRN wheezing or increased work of breathing using Per Protocol order mode. 11-13 - enter or revise RT Bronchodilator order(s) to one equivalent RT bronchodilator order with QID Frequency and an Albuterol order with Frequency of every 4 hours PRN wheezing or increased work of breathing using Per Protocol order mode. Greater than 13 - enter or revise RT Bronchodilator order(s) to one equivalent RT bronchodilator order with every 4 hours Frequency and an Albuterol order with Frequency of every 2 hours PRN wheezing or increased work of breathing using Per Protocol order mode. RT to enter RT Home Evaluation for COPD & MDI Assessment order using Per Protocol order mode.     Electronically signed by Ritchie Quezada RCP on 5/16/2022 at 2:34 PM

## 2022-05-16 NOTE — PROGRESS NOTES
[facility] called again, message left for technician to return call. Mattress not inflating properly, awaiting return call.

## 2022-05-16 NOTE — PROGRESS NOTES
Patient has 10 beats of V-Tach, self terminating, resting in bed, no s/s distress, awaiting lab results.

## 2022-05-16 NOTE — PROGRESS NOTES
05/15/22 2249   NICU Vent Information   Vent Type 980   Vent Mode AC/VC   Vt (Set, mL) 490 mL   Vt Exhaled 518 mL   Resp Rate (Set) 16 bmp   Rate Measured 20 br/min   Minute Volume 10.4 Liters   Peak Flow 60 L/min   Pressure Support 0 cmH20   FiO2  40 %   Peak Inspiratory Pressure 26 cmH2O   I:E Ratio 1:2.60   Sensitivity 3   High Peep/I Pressure 0   PEEP/CPAP (cmH2O) 5   I Time/ I Time % 0 s   Mean Airway Pressure 12 cmH20   Cough/Sputum   Sputum How Obtained Suctioned;Endotracheal   Sputum Amount Small   Sputum Color Frothy;Red   Tenacity Thick   Breath Sounds   Right Upper Lobe Diminished   Right Middle Lobe Diminished   Right Lower Lobe Diminished   Left Upper Lobe Diminished   Left Lower Lobe Diminished   Additional Respiratory Assessments   Pulse 95   Resp 16   SpO2 95 %   Position Semi-Mejía's   Vent Alarm Settings   High Pressure  50 cmH2O   Low Minute Volume Alarm 3 L/min   RR High 40 br/min   ETT (adult)   Placement Date/Time: 05/11/22 1130   Preoxygenation: Yes  Airway Type: Cuffed  Airway Tube Size: 8 mm  Blade Size: 4  Location: Oral  Insertion attempts: 1  Secured At: 24 cm  Measured From: Lips   ETT Placement Left

## 2022-05-16 NOTE — OP NOTE
Operative Note      Patient: Charlene Walker  YOB: 1965  MRN: [id number]    Date of Procedure: 5/16/2022    Pre-Op Diagnosis: RESPIRATORY FAILURE    Post-Op Diagnosis: Same       Procedure(s):  TRACHEOTOMY    Surgeon(s):  Marcia Sagastume DO    Assistant:   Surgical Assistant: Boo Aguilar    Anesthesia: General    Estimated Blood Loss (mL): Minimal    Complications: None    Specimens:   * No specimens in log *    Implants:  * No implants in log *      Drains:   NG/OG/NJ/NE Tube Orogastric (Active)   Surrounding Skin Clean, dry & intact 05/16/22 0800   Securement device Tape 05/16/22 0800   Status Continuous feeding 05/15/22 2000   Placement Verified X-Ray (repeat); External Catheter Length;Gastric Contents 05/16/22 0800   NG/OG/NJ/NE External Measurement (cm) 60 cm 05/16/22 0800   Drainage Appearance Tan 05/16/22 0800   Tube Feeding Renal Formula 05/15/22 2000   Tube feeding/verify rate (mL/hr) 34 mL/hr 05/15/22 1000   Tube Feeding Supplement (Product) Protein Modular 05/15/22 2000   Tube Feeding Intake (mL) 0 ml 05/16/22 0600   Tube Feeding Supplement Amount (mL) 0 05/15/22 0000   Free Water/Flush (mL) 30 mL 05/16/22 0200   Output (mL) 0 ml 05/16/22 0615   Action Taken Feed set changed; Tubing changed 05/16/22 0800   Residual Volume (ml) 65 ml 05/15/22 0000       Colostomy LUQ (Active)   Stomal Appliance 1 piece; Intact 05/16/22 0800   Stoma  Assessment Pink;Moist 05/16/22 0800   Peristomal Assessment Clean, dry & intact 05/16/22 0800   Treatment Site care 05/15/22 0400   Stool Appearance Watery 05/16/22 0800   Stool Color Brown 05/16/22 0800   Stool Amount Small 05/16/22 0800   Output (mL) 150 ml 05/16/22 0615       Urinary Catheter Delgado-Temperature (Active)   Catheter Indications Need for fluid volume management of the critically ill patient in a critical care setting;Assist in healing of open sacral or perineal wounds (Stage III, IV, or unstageable documented by a provider or enterostomal therapy) and/or full thickness perineal and lower extremity burns in incontinent patients;Prolonged immobilization (e.g. unstable thoracic or lumbar spine, multiple traumatic injuries such as pelvic fractures) 05/16/22 0800   Site Assessment Pink;Retracted; Swelling 05/16/22 0800   Urine Color Tea 05/16/22 0800   Urine Appearance Cloudy 05/16/22 0800   Collection Container Standard 05/16/22 0800   Securement Method Securing device (Describe) 05/16/22 0800   Catheter Care Completed Yes 05/16/22 0600   Catheter Best Practices  Catheter secured to thigh; Tamper seal intact; Bag below bladder;Bag not on floor; Lack of dependent loop in tubing;Drainage bag less than half full 05/16/22 0800   Status Draining 05/16/22 0800   Output (mL) 0 mL 05/16/22 0800       [REMOVED] NG/OG/NJ/NE Tube Orogastric Center mouth (Removed)   Surrounding Skin Clean, dry & intact 05/06/22 0800   Securement device Tape 05/06/22 0806   Status Clamped 05/06/22 0920   Placement Verified External Catheter Length;Gastric Contents 05/06/22 0806   NG/OG/NJ/NE External Measurement (cm) 64 cm 05/06/22 0806   Drainage Appearance Brown 05/06/22 0806   Tube Feeding Renal Formula 05/06/22 0806   Tube feeding/verify rate (mL/hr) 40 mL/hr 05/06/22 0806   Tube Feeding Supplement (Product) Protein Modular 05/06/22 0806   Tube Feeding Intake (mL) 24 ml 05/06/22 0900   Tube Feeding Supplement Amount (mL) 75 05/06/22 0806   Free Water/Flush (mL) 25 mL 05/06/22 0900   Output (mL) 35 ml 05/04/22 1200   Action Taken Placement verified (comment) 05/06/22 0800   Residual Volume (ml) 60 ml 05/06/22 0806       [REMOVED] Urinary Catheter (Removed)   Catheter Indications Prolonged immobilization (e.g. unstable thoracic or lumbar spine, multiple traumatic injuries such as pelvic fractures) 04/22/22 0416       Findings: 1) trachea deep in the neck requiring a Shiley 6 Cuffed tracheostomy    Detailed Description of Procedure:   After verification of informed consent, the patient was brought to the operating room and placed in the supine position. General anesthesia was induced. The cricoid and sternal notch were marked out. A horizontal incision below the cricoid was marked with a marking pen. This was injected with 1% lidocaine with 1:100,000 epinephrine. The patient was prepped and draped in a sterile fashion. A proper timeout was performed. A 15 blade was used to make a horizontal neck incision through the skin to the subcutaneous fat. Bovie electrocautery on coagulation 22 was used to dissect down through the subcutaneous fat and through the platysma. Once through the platysma, vertical oriented dissection plane was made down to the strap muscles. The midline raphe was identified and split using bovie electrocautery. The thyroid was encountered. The thyroid was split midline using bovie electrocautery. Blunt dissection with Kittner's was used to clean off the trachea. The trachea was identified and palpated. The 2nd and 3rd tracheal rings were identified. A 6-0 Proximal XLT Shiley cuffed tracheostomy tube was brought onto the field and tested. An 11-blade was used to make a horizontal incision between the 2nd and 3rd tracheal rings, entering the trachea. At this time, the anesthesia team pulled back on the ET tube so it was above the level of dissection. A cricoid hook was used to elevate the trachea. Tracheal spreaders were introduced into the incision for dilation. The 6-0 Proximal XLT Shiley tracheostomy tube was then inserted using the obturator. The obturator was removed and the inner cannula was inserted. The tracheostomy was hooked up to the ventilation circuit. This was found to be in good position. The balloon was inflated. The tracheostomy was sutured in place with Vicryl sutures in 4 quadrants. A neck strap was attached to the tracheostomy tube. Care of the patient was turned back over to the anesthesia team. This concluded my portion of the procedure.  There were no complications with the procedure. The patient tolerated the procedure well.     Electronically signed by Candice Galicia DO on 5/16/2022 at 10:54 AM

## 2022-05-16 NOTE — PROGRESS NOTES
05/16/22 1857   Patient Observation   Pulse 85   Resp 16   SpO2 97 %   Breath Sounds   Right Upper Lobe Diminished   Right Middle Lobe Diminished   Right Lower Lobe Diminished   Left Upper Lobe Diminished   Left Lower Lobe Diminished   Airway Clearance   Suction Trach   Suction Device Inline suction catheter   Sputum Method Obtained Tracheal   Sputum Amount Scant   Sputum Color/Odor White   Sputum Consistency Thick   Vent Information   Vent Mode AC/VC   Vent Settings   FiO2  50 %   Resp Rate (Set) 16 bmp   Vt (Set, mL) 490 mL   PEEP/CPAP (cmH2O) 5   Trigger Sensitivity Flow (L/min) 3 L/min   Vent Patient Data (Readings)   Vt Exhaled 509 mL   Rate Measured 16 br/min   Minute Volume 8.2 Liters   Peak Flow 60 L/min   I:E Ratio 1:3.2   Peak Inspiratory Pressure 28 cmH2O   Mean Airway Pressure 11 cmH20   Plateau Pressure (cm H2O) 26 cm H2O   Static Compliance (L/cm H2O) 24   Dynamic Compliance (L/cm H2O) 22 L/cm H2O   Vent Alarm Settings   High Pressure  50 cmH2O   Low Minute Volume Alarm 3 L/min   Ve Max 20   Ve Min 3   Vt Low  300 mL   Vt High  1000 mL   RR High 40 br/min   Apnea (Secs) 20 secs   Ambu Bag With Mask At Bedside Yes   Additional Respiratory Assessments   Position Semi-Mejía's   Humidification Source Heated wire   Humidification Temp 37   Circuit Condensation Drained   [REMOVED] Surgical Airway (Trach) 05/16/22 Shiley Cuffed   Removal Date/Time: 05/16/22 1104  Placement Date/Time: 05/16/22 0946   Present on Admission/Arrival: No  Placed By: In surgery  Surgical Airway Type: Tracheostomy  Brand: Dao  Style: (c) Cuffed  Size (mm): 6   Status Secured   Site Assessment Clean;Dry   Ties Assessment Clean;Dry; Intact
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
## 2022-05-16 NOTE — PROGRESS NOTES
05/16/22 0326   NICU Vent Information   Vent Type 980   Vent Mode AC/VC   Vt (Set, mL) 490 mL   Vt Exhaled 515 mL   Resp Rate (Set) 16 bmp   Rate Measured 16 br/min   Minute Volume 8.29 Liters   Peak Flow 60 L/min   Pressure Support 0 cmH20   FiO2  40 %   Peak Inspiratory Pressure 28 cmH2O   I:E Ratio 1:3.20   Sensitivity 3   High Peep/I Pressure 0   PEEP/CPAP (cmH2O) 5   I Time/ I Time % 0 s   Mean Airway Pressure 11 cmH20   Cough/Sputum   Sputum How Obtained Suctioned;Endotracheal   Sputum Amount None   Breath Sounds   Right Upper Lobe Diminished   Right Middle Lobe Diminished   Right Lower Lobe Diminished   Left Upper Lobe Diminished   Left Lower Lobe Diminished   Additional Respiratory Assessments   Pulse 87   Resp 16   SpO2 98 %   Position Semi-Mejía's   Vent Alarm Settings   High Pressure  50 cmH2O   Low Minute Volume Alarm 3 L/min   RR High 40 br/min

## 2022-05-16 NOTE — PROGRESS NOTES
AM assessment completed. AM labs reviewed. Pt intubated & on CRRT. CRRT running w/o incident- keeping pt even. Heparin gtt off for tracheostomy placement. Port WNL. Midline WNL. Delgado in place for strict I/o. Pt on levophed & vaso for hypotension. Nightshift RN completed daily wound care & bath w/ linen change. No new orders at present time.   Gelacio Little RN, BSN

## 2022-05-16 NOTE — PROGRESS NOTES
No return call from [facility], clinical and charge nurse aware, new order placed for new bed stat.

## 2022-05-16 NOTE — BRIEF OP NOTE
Brief Postoperative Note      Patient: David Goode  YOB: 1965  MRN: [id number]    Date of Procedure: 5/16/2022    Pre-Op Diagnosis: DYSPHAGIA    Post-Op Diagnosis: normal EGD s/p 20F PEG tube placement. external bolster at 3cm monico       Procedure(s):  EGD/PEG (INTUBATED)    Surgeon(s):  Bethel Garcia MD    Assistant:  * No surgical staff found *    Anesthesia: None    Estimated Blood Loss (mL): Minimal    Complications: None    Specimens:   * No specimens in log *    Implants:  * No implants in log *      Drains:   NG/OG/NJ/NE Tube Orogastric (Active)   Surrounding Skin Clean, dry & intact 05/16/22 1200   Securement device Tape 05/16/22 1200   Status Continuous feeding 05/15/22 2000   Placement Verified X-Ray (repeat); External Catheter Length;Gastric Contents 05/16/22 1200   NG/OG/NJ/NE External Measurement (cm) 60 cm 05/16/22 1200   Drainage Appearance Tan 05/16/22 1200   Tube Feeding Renal Formula 05/15/22 2000   Tube feeding/verify rate (mL/hr) 34 mL/hr 05/15/22 1000   Tube Feeding Supplement (Product) Protein Modular 05/15/22 2000   Tube Feeding Intake (mL) 0 ml 05/16/22 0600   Tube Feeding Supplement Amount (mL) 0 05/15/22 0000   Free Water/Flush (mL) 30 mL 05/16/22 0200   Output (mL) 0 ml 05/16/22 0615   Action Taken Feed set changed; Tubing changed 05/16/22 1200   Residual Volume (ml) 65 ml 05/15/22 0000       Gastrostomy/Enterostomy/Jejunostomy Tube Percutaneous Endoscopic Gastrostomy (PEG) LUQ 1 20 fr (Active)       Colostomy LUQ (Active)   Stomal Appliance 1 piece; Intact 05/16/22 1200   Stoma  Assessment Pink;Moist 05/16/22 1200   Peristomal Assessment Clean, dry & intact 05/16/22 1200   Treatment Site care 05/15/22 0400   Stool Appearance Watery 05/16/22 1200   Stool Color Brown 05/16/22 1200   Stool Amount Small 05/16/22 1200   Output (mL) 0 ml 05/16/22 1400       Urinary Catheter Delgado-Temperature (Active)   Catheter Indications Need for fluid volume management of the critically ill patient in a critical care setting;Assist in healing of open sacral or perineal wounds (Stage III, IV, or unstageable documented by a provider or enterostomal therapy) and/or full thickness perineal and lower extremity burns in incontinent patients;Prolonged immobilization (e.g. unstable thoracic or lumbar spine, multiple traumatic injuries such as pelvic fractures) 05/16/22 1200   Site Assessment Pink;Retracted; Swelling 05/16/22 1200   Urine Color Tea 05/16/22 1200   Urine Appearance Cloudy 05/16/22 1200   Collection Container Standard 05/16/22 1200   Securement Method Securing device (Describe) 05/16/22 1200   Catheter Care Completed Yes 05/16/22 0600   Catheter Best Practices  Catheter secured to thigh; Tamper seal intact; Bag below bladder;Bag not on floor; Lack of dependent loop in tubing;Drainage bag less than half full 05/16/22 1200   Status Draining 05/16/22 1200   Output (mL) 0 mL 05/16/22 0800       [REMOVED] NG/OG/NJ/NE Tube Orogastric Center mouth (Removed)   Surrounding Skin Clean, dry & intact 05/06/22 0800   Securement device Tape 05/06/22 0806   Status Clamped 05/06/22 0920   Placement Verified External Catheter Length;Gastric Contents 05/06/22 0806   NG/OG/NJ/NE External Measurement (cm) 64 cm 05/06/22 0806   Drainage Appearance Brown 05/06/22 0806   Tube Feeding Renal Formula 05/06/22 0806   Tube feeding/verify rate (mL/hr) 40 mL/hr 05/06/22 0806   Tube Feeding Supplement (Product) Protein Modular 05/06/22 0806   Tube Feeding Intake (mL) 24 ml 05/06/22 0900   Tube Feeding Supplement Amount (mL) 75 05/06/22 0806   Free Water/Flush (mL) 25 mL 05/06/22 0900   Output (mL) 35 ml 05/04/22 1200   Action Taken Placement verified (comment) 05/06/22 0800   Residual Volume (ml) 60 ml 05/06/22 0806       [REMOVED] Urinary Catheter (Removed)   Catheter Indications Prolonged immobilization (e.g. unstable thoracic or lumbar spine, multiple traumatic injuries such as pelvic fractures) 04/22/22 0636 Findings: normal EGD s/p 20F PEG tube placement. external bolster at 3cm monico    Recommendation  1. Continue supportive care  2. Meds and water bolus in 4h  3. Start TFs tomorrow AM per dietitian recs  4. May resume heparin in 4h  5. Keep PEG site clean and dry  6.  Flush PEG with 60mL free water every 6h and after use    Electronically signed by Pili June MD on 5/16/2022 at 4:16 PM
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
## 2022-05-16 NOTE — PROGRESS NOTES
Patient mattress still not working properly, Agility called x3, no return call, have repositioned pillows. Now placed in different regular hospital bed, pillows placed. Turned to left side, dressing changed to back wound, large purple area noted, will place new consult to wound care for changes in back wound. Patient bathed, repositioned, new mipilex placed to left ankle. Delgado and Oral care complete. Suctioned per ET no secretions, lung sounds diminished. Heparin has been discontinued for am surgery, NPO, tube feeding removed.
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
## 2022-05-16 NOTE — ANESTHESIA POSTPROCEDURE EVALUATION
Department of Anesthesiology  Postprocedure Note    Patient: Rachna Tristan  MRN: [id number]  YOB: 1965  Date of evaluation: 5/16/2022  Time:  11:47 AM     Procedure Summary     Date: 05/16/22 Room / Location: [place] [address] / [facility]    Anesthesia Start: 3691 Anesthesia Stop: 9104    Procedure: TRACHEOTOMY (N/A Neck) Diagnosis: (RESPIRATORY FAILURE)    Surgeons: Angélica Nash DO Responsible Provider: Nigel Erwin MD    Anesthesia Type: general ASA Status: 4          Anesthesia Type: No value filed. Cleve Phase I:      Cleve Phase II:      Last vitals: Reviewed and per EMR flowsheets.      Vitals:    05/16/22 0730 05/16/22 0800 05/16/22 0900 05/16/22 1100   BP: 117/68 92/72 98/61 123/64   Pulse: 83 95 90 84   Resp: 16 21 16 16   Temp:       TempSrc:       SpO2: 97% 96% 96% 96%   Weight:       Height:         Anesthesia Post Evaluation    Patient location during evaluation: ICU  Patient participation: complete - patient cannot participate  Level of consciousness: awake  Airway patency: patent  Nausea & Vomiting: no nausea  Complications: no  Cardiovascular status: hemodynamically stable (PRESSORS AS PREOP)  Respiratory status: acceptable  Hydration status: euvolemic

## 2022-05-16 NOTE — PROGRESS NOTES
High risk vesicant drug infusing:  ___x_______    Multiple incompatible medications infusing:  ____x_____    CVP Monitoring:  _________    Extremely difficult IV access challenge:  ____x____    Continued need for central line access:  ____x______    Addressed with physician:  _____x___    RIGHT PATIENT, RIGHT TIME, RIGHT LINE

## 2022-05-16 NOTE — PROGRESS NOTES
RT Inhaler-Nebulizer Bronchodilator Protocol Note    There is a bronchodilator order in the chart from a provider indicating to follow the RT Bronchodilator Protocol and there is an Initiate RT Inhaler-Nebulizer Bronchodilator Protocol order as well (see protocol at bottom of note). CXR Findings:  XR CHEST PORTABLE    Result Date: 5/16/2022  Endotracheal tube remains approximately 3 cm above the quintin. Poor inspiration with basilar airspace disease and possible effusion primarily on the left. XR CHEST PORTABLE    Result Date: 5/15/2022  Persisting left lower lobe airspace disease with volume loss or collapse. Mucous plugging considered. Improving aeration right lower lobe. The findings from the last RT Protocol Assessment were as follows:   History Pulmonary Disease: Chronic pulmonary disease  Respiratory Pattern: Regular pattern and RR 12-20 bpm  Breath Sounds: Slightly diminished and/or crackles  Cough: Weak, non-productive  Indication for Bronchodilator Therapy: On home bronchodilators  Bronchodilator Assessment Score: 7    Aerosolized bronchodilator medication orders have been revised according to the RT Inhaler-Nebulizer Bronchodilator Protocol below. Respiratory Therapist to perform RT Therapy Protocol Assessment initially then follow the protocol. Repeat RT Therapy Protocol Assessment PRN for score 0-3 or on second treatment, BID, and PRN for scores above 3. No Indications - adjust the frequency to every 6 hours PRN wheezing or bronchospasm, if no treatments needed after 48 hours then discontinue using Per Protocol order mode. If indication present, adjust the RT bronchodilator orders based on the Bronchodilator Assessment Score as indicated below.   Use Inhaler orders unless patient has one or more of the following: on home nebulizer, not able to hold breath for 10 seconds, is not alert and oriented, cannot activate and use MDI correctly, or respiratory rate 25 breaths per minute or more, then use the equivalent nebulizer order(s) with same Frequency and PRN reasons based on the score. If a patient is on this medication at home then do not decrease Frequency below that used at home. 0-3 - enter or revise RT bronchodilator order(s) to equivalent RT Bronchodilator order with Frequency of every 4 hours PRN for wheezing or increased work of breathing using Per Protocol order mode. 4-6 - enter or revise RT Bronchodilator order(s) to two equivalent RT bronchodilator orders with one order with BID Frequency and one order with Frequency of every 4 hours PRN wheezing or increased work of breathing using Per Protocol order mode. 7-10 - enter or revise RT Bronchodilator order(s) to two equivalent RT bronchodilator orders with one order with TID Frequency and one order with Frequency of every 4 hours PRN wheezing or increased work of breathing using Per Protocol order mode. 11-13 - enter or revise RT Bronchodilator order(s) to one equivalent RT bronchodilator order with QID Frequency and an Albuterol order with Frequency of every 4 hours PRN wheezing or increased work of breathing using Per Protocol order mode. Greater than 13 - enter or revise RT Bronchodilator order(s) to one equivalent RT bronchodilator order with every 4 hours Frequency and an Albuterol order with Frequency of every 2 hours PRN wheezing or increased work of breathing using Per Protocol order mode.        Electronically signed by Tripp Alan RCP on 5/16/2022 at 7:20 PM

## 2022-05-16 NOTE — ANESTHESIA PRE PROCEDURE
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 VASOP 0.03U/MIN  VENT 35%, 16, 5, 490            Anesthesia Plan      general     ASA 4       Induction: intravenous. MIPS: Postoperative opioids intended and Prophylactic antiemetics administered. Anesthetic plan and risks discussed with patient, child/children and mother. Plan discussed with CRNA.                 Vitals:    05/16/22 0700 05/16/22 0715 05/16/22 0730 05/16/22 0800   BP: (!) 98/57 (!) 115/54 117/68 92/72   Pulse: 89 87 83 95   Resp: 18 16 16 21   Temp: 97.9 °F (36.6 °C)      TempSrc: Bladder      SpO2: 97% 97% 97% 96%   Weight:       Height:         Linn Bender MD   5/16/2022

## 2022-05-16 NOTE — PROGRESS NOTES
Comprehensive Nutrition Assessment    Type and Reason for Visit:  Reassess    Nutrition Recommendations/Plan:   1. Continue NPO status until patient is medically cleared to receive nutrition therapy via newly placed PEG tube. 2. TF recommendations - ADULT TUBE FEEDING; PEG tube; Renal formula - Nepro with a goal rate of 35 ml/hr x 20 hours. Start with 20 ml/hr and increase by 15 ml every 4 hours, as tolerated by patient, until goal rate can be achieved and maintained. Water flushes, 30 ml every 4 hours for tube patency. Please administer one proteinex P2Go TWICE daily via feeding tube. 3. Monitor respiratory status, when TF regimen can be started via PEG tube, and plan of care. 4. Monitor nutrition-related labs, ostomy output/function, and weight trends.       Malnutrition Assessment:  Malnutrition Status:  Mild malnutrition (05/16/22 1209)    Context:  Acute Illness     Findings of the 6 clinical characteristics of malnutrition:  Energy Intake:  Mild decrease in energy intake (TF stopped for trach placement + paracentesis and PEG tube placement today)  Weight Loss:  Greater than 5% over 1 month (- 55# or 19.3% weight loss since 4/22/22)     Body Fat Loss:  Unable to assess (patient having procedures today) Orbital   Muscle Mass Loss:  Unable to assess (patient having procedures today) Temples (temporalis)  Fluid Accumulation:  No significant fluid accumulation Extremities (BLE + 1 pitting edema)   Strength:  Not Performed    Nutrition Assessment:    patient has improved from a nutritional standpoint AEB he was receiving TF at goal rate prior to TF being held for trach placement this am + patient is having paracentesis completed and PEG tube placed this afternoon; he remains at risk for further compromise d/t need for EN as sole source of nutrition at this time, renal dysfunction + CRRT prior to trach procedure this am, altered nutrition-related labs, and significant weight loss during this admission; will continue NPO status until patient is medically cleared to receive EN via newly placed PEG tube    Nutrition Related Findings:    patient had trach placed this am; patient is A & O when awake; he will have paracentesis and PEG tube placement this afternoon; patient was tolerating TF PTA; last documented BM was on 5/15/22 - patient has an ostomy; CRRT was stopped this am prior to patient be transported to have trach placed; + bronch on 5/15/22; patient has levo and vaso in D5; patient has 15 units Lantus nightly and high-dose SSI ordered for blood glucose trends at this time Wound Type: Multiple,Pressure Injury,Surgical Incision,Unstageable,Full Thickness,Partial Thickness (full thickness, trauma - pre-tib; unstageable left lower leg; full thickness, surgical - mid back; partial thickness skin loss - sacrum/buttocks)       Current Nutrition Intake & Therapies:    Average Meal Intake: NPO  Average Supplements Intake: NPO  Current Tube Feeding (TF) Orders:  · Feeding Route: PEG  · Formula: Renal Formula  · Schedule: Continuous  · Feeding Regimen: Nepro with a goal rate of 35 ml/hr x 20 hours  · Additives/Modulars: Protein (one proteinex P2Go TWICE daily via feeding tube)  · Water Flushes: 30 ml every 4 hours for tube patency  · Current TF & Flush Orders Provides: TF on hold for trach placement + paracentesis + PEG tube placement today  · Goal TF & Flush Orders Provides: Nepro with a goal rate of 35 ml/hr x 20 hours = 700 ml TV, 1260 kcals, 57 g protein, and 509 ml free water + 52 g protein and 208 kcals from one proteinex P2Go TWICE daily (109 g protein and 1468 kcals total) + 30 ml water flushes every 4 hours for tube patency      Anthropometric Measures:  Height: 6' 2\" (188 cm)  Ideal Body Weight (IBW): 190 lbs (86 kg)    Admission Body Weight: 288 lb 12.8 oz (131 kg) (obtained on 4/22/22; actual weight)  Current Body Weight: 233 lb (105.7 kg) (obtained on 5/16/22; actual weight), 122.6 % IBW.  Weight Source: Bed Scale  Current BMI (kg/m2): 29.9  Usual Body Weight: 288 lb 12.8 oz (131 kg) (obtained on 4/22/22; actual weight)  % Weight Change (Calculated): -19.3  Weight Adjustment For: Paraplegia,Amputation  Total Adjusted Percentage (Calculated): 13.4  Adjusted Ideal Body Weight (lbs) (Calculated): 164.5 lbs  Adjusted Ideal Body Weight (kg) (Calculated): 74.77 kg  Adjusted % Ideal Body Weight (Calculated): 141.6  Adjusted BMI (kg/m2) (Calculated): 33.9  BMI Categories: Obese Class 1 (BMI 30.0-34. 9)    Estimated Daily Nutrient Needs:  Energy Requirements Based On: Kcal/kg  Weight Used for Energy Requirements: Current  Energy (kcal/day): 1155 - 1470 kcals based on 11-14 kcals/kg/CBW  Weight Used for Protein Requirements: Adjusted (adjusted IBW)  Protein (g/day): 135 - 150 kcals based on 1.8-2.0 g/kg/adjusted IBW (+ CRRT)  Method Used for Fluid Requirements: 1 ml/kcal  Fluid (ml/day): 1155 - 1470 ml    Nutrition Diagnosis:   · Inadequate oral intake related to inadequate protein-energy intake,impaired respiratory function,renal dysfunction,increase demand for energy/nutrients as evidenced by NPO or clear liquid status due to medical condition,intubation,nutrition support - enteral nutrition,wounds,weight loss,lab values,dialysis      Nutrition Interventions:   Food and/or Nutrient Delivery: Continue NPO,Start Tube Feeding  Nutrition Education/Counseling: No recommendation at this time  Coordination of Nutrition Care: Continue to monitor while inpatient,Interdisciplinary Rounds,Coordination of Care  Plan of Care discussed with: ICU Team    Goals:  Previous Goal Met: Goal(s) Achieved  Goals:  Tolerate nutrition support at goal rate       Nutrition Monitoring and Evaluation:   Behavioral-Environmental Outcomes: None Identified  Food/Nutrient Intake Outcomes: Diet Advancement/Tolerance,Enteral Nutrition Intake/Tolerance  Physical Signs/Symptoms Outcomes: Biochemical Data,GI Status,Fluid Status or Edema,Hemodynamic Status,Nutrition Focused Physical Findings,Skin,Weight    Discharge Planning:    Enteral Nutrition     Renae Bain, [address] [place] [address],   Contact: [phone]
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
## 2022-05-16 NOTE — CARE COORDINATION
INTERDISCIPLINARY PLAN OF CARE CONFERENCE    Date/Time: 5/16/2022 9:26 AM  Completed by: CONRAD Jeong  Case Management    Patient Name:  Carlene Dakins  YOB: 1965  Admitting Diagnosis: Hyperkalemia [E87.5]  Hypoglycemia [E16.2]  ESRD on dialysis ([address] [N18.6, Z99.2]  Acute cystitis with hematuria [N30.01]  Septic shock ([address] [A41.9, R65.21]  Sepsis ([address] [A41.9]  Pneumonia due to infectious organism, unspecified laterality, unspecified part of lung [J18.9]     Admit Date/Time:  4/21/2022  1:21 PM    Chart reviewed. Interdisciplinary team contacted or reviewed plan related to patient progress and discharge plans. Disciplines included Case Management, Nursing, and Dietitian. Current Status:ongoing. Getting trach and peg today   PT/OT recommendation for discharge plan of care: will need new orders when appropriate. Expected D/C Disposition:  [facility])  Confirmed plan with patient and/or family Yes confirmed with: (name) pt's wife Rachell via phone call. Pt's son Lakesha Shaikh and mother in ICU waiting room    Discharge Plan Comments: Chart review completed. Completed Interdisciplinary rounds with ICU staff. Called and spoke with pt's wife Rachell. Discussed LTACH with her and she requested writer discuss this with Lakesha Vincentheydi as well. She denied needs for CM at this time    Met with pt's son Lakesha Shaikh and mother in the ICU waiting area. Lakesha Shaikh states he just spoke with Rachell. Provided Lakeshabradford Shaikh with [facility] [address] [address] [address] list to review and he states pt was at [facility] in the past. He is aware pt must be medically stable from the MD's to transfer to [facility] [address]. Encouraged them to discuss LTACH and call writer with determination. CM contact number given to Lakesha Shaikh. CM will continue to follow and assist. Please notify CM if needs or concerns arise.     Home O2 in place on admit: Yes

## 2022-05-16 NOTE — PROGRESS NOTES
Hospitalist Progress Note      PCP: Carlo lAtamirano MD    Date of Admission: 4/21/2022    Subjective:     49 y.o. male with hx of ischemic CM, paraplegia, IDDM, ESRD on HD presents with bacteremia and decubitus ulcers, septic shock     Improving sepsis from last week, off sammi, vaso, dobutamine, now remains on levophed,      Ongoing CRRT with fluid removal   No fevers    5/11  Patient was on Airvo and BiPAP-> worsening hypoxemia-> intubated. Continued on Levophed   Continued on CRRT     5/14  Continues to require pressors  On CRRT   Remains on mechanical vent. FiO2 45%. Awake and alert. Plan is for tracheostomy and PEG tube placement on Monday      5/15  Worsening hypoxemia today with FiO2 up to 60% and PEEP up to 8, s/p bronchoscopy with aspiration of mucus plugs . Currently FiO2 is at 50% PEEP is at 8 .    5/16-plans for tracheostomy and PEG tube today. Awake and alert on the vent. Air mattress is apparently not working. Pressure injury to the back.       Medications:  Reviewed    Infusion Medications    vasopressin (Septic Shock) infusion 0.03 Units/min (05/16/22 0922)    dextrose      prismaSATE BGK 4/2.5 500 mL/hr at 05/15/22 2358    prismaSATE BGK 4/2.5 500 mL/hr at 05/15/22 2358    prismaSATE BGK 4/2.5 500 mL/hr at 05/15/22 2358    norepinephrine 8 mcg/min (05/16/22 1215)    sodium chloride Stopped (05/10/22 2311)     Scheduled Medications    midodrine  15 mg Oral TID WC    [START ON 5/17/2022] apixaban  2.5 mg Oral BID    sodium chloride  500 mL IntraVENous Once    insulin glargine  15 Units SubCUTAneous Nightly    colistimethate (COLY-MYCIN) nebulization  150 mg Nebulization Q8H    collagenase   Topical Daily    sodium chloride  1,000 mL IntraVENous Once    tigecycline (TYGACIL) IVPB  50 mg IntraVENous Q12H    epoetin bebo-epbx  3,000 Units SubCUTAneous Once per day on Mon Wed Fri    ipratropium-albuterol  1 ampule Inhalation Q4H While awake    sennosides-docusate sodium  2 tablet Oral BID    insulin lispro  0-18 Units SubCUTAneous Q4H    pantoprazole  40 mg IntraVENous Daily    menthol-zinc oxide   Topical Daily    aspirin  81 mg Oral Nightly    sodium chloride flush  5-40 mL IntraVENous 2 times per day     PRN Meds: fentanNYL, sodium phosphate IVPB **OR** sodium phosphate IVPB **OR** sodium phosphate IVPB **OR** sodium phosphate IVPB, oxyCODONE-acetaminophen, traZODone, ipratropium-albuterol, dextrose bolus **OR** dextrose bolus, glucagon (rDNA), dextrose, potassium chloride, magnesium sulfate, calcium gluconate **OR** calcium gluconate **OR** calcium gluconate **OR** calcium gluconate, glucose, midazolam, sodium chloride flush, sodium chloride, ondansetron **OR** ondansetron, polyethylene glycol, acetaminophen **OR** acetaminophen      Intake/Output Summary (Last 24 hours) at 5/16/2022 1316  Last data filed at 5/16/2022 1030  Gross per 24 hour   Intake 2354 ml   Output 2262 ml   Net 92 ml       Physical Exam Performed:    BP (!) 103/45   Pulse 88   Temp 97.9 °F (36.6 °C) (Bladder)   Resp 16   Ht 6' 2\" (1.88 m)   Wt 233 lb (105.7 kg) Comment: new bed  SpO2 97%   BMI 29.92 kg/m²       General appearance: intubated, on mechanical ventilation. Responding. Chronically ill-appearing  O2 sats stable at FiO2 45%  Ongoing CRRT  HEENT: NAD, pupils reactive to light  Neck: Supple, . No jugular venous distention. Trachea midline. Respiratory: Diminished breath sounds bilaterally,  Clear, no rhonchi today  Cardiovascular: S 1 s2 heard,  Right chest HD catheter and Port noted  Abdomen: Soft, non-tender, +distended with normal bowel sounds. Colostomy noted in left LQ  Musculoskeletal: Right BKA with stump healthy.  Superficial skin ulcers on right thigh with no active infection noted  Left LE edema with superficial ulceration noted   Skin: see Epic wound pictures, chronic exposed hardware in lower thoracic and lumbar region   Neurologic: Awake, intubated  paraplegic , atrophy of forearm and hand muscles   Fully awake  alert oriented    Labs:   Recent Labs     05/14/22  0346 05/15/22  0320 05/16/22  0400   WBC 13.0* 10.5 10.8   HGB 9.2* 9.1* 9.0*   HCT 28.8* 28.4* 29.0*   * 89* 72*     Recent Labs     05/15/22  2132 05/16/22  0400 05/16/22  1115   * 127* 129*   K 3.9 3.8 3.9   CL 94* 93* 96*   CO2 23 24 25   BUN 18 19 17   CREATININE 0.6* 0.6* <0.5*   CALCIUM 8.3 8.6 7.8*   PHOS 2.5 2.1* 2.6     No results for input(s): AST, ALT, BILIDIR, BILITOT, ALKPHOS in the last 72 hours. Recent Labs     05/16/22  0742   INR 1.48*     Recent Labs     05/14/22  1015   TROPONINI 0.05*       Urinalysis:      Lab Results   Component Value Date    NITRU Negative 04/21/2022    WBCUA 21-50 04/21/2022    BACTERIA 4+ 04/21/2022    RBCUA  04/21/2022    BLOODU LARGE 04/21/2022    SPECGRAV >=1.030 04/21/2022    GLUCOSEU Negative 04/21/2022    GLUCOSEU >=1000 mg/dL 08/31/2010       Radiology:  XR CHEST PORTABLE   Final Result   Endotracheal tube remains approximately 3 cm above the quintin. Poor inspiration with basilar airspace disease and possible effusion   primarily on the left. XR CHEST PORTABLE   Final Result   Persisting left lower lobe airspace disease with volume loss or collapse. Mucous plugging considered. Improving aeration right lower lobe. XR CHEST PORTABLE   Final Result   Persistent pleural-parenchymal disease at the lung bases, similar to prior. XR CHEST PORTABLE   Final Result   Improving left lower lobe consolidation         XR CHEST PORTABLE   Final Result   1. Unchanged position of support devices. 2. Vascular congestion, basilar opacities and small pleural effusions are   again demonstrated and without significant change.          CT ABDOMEN PELVIS W IV CONTRAST Additional Contrast? None   Final Result   Increasing opacifications of the lung bases with small to moderate pleural   effusions right greater than left and adjacent opacifications which appear   greatest in the left lower lobe of worsening airspace disease and/or   atelectasis from prior comparison 04/21/2022      Similar appearance of small to moderate volume abdominopelvic ascites without   loculated or heterogeneous fluid collection otherwise evident greatest in the   perihepatic and perisplenic regions         XR CHEST PORTABLE   Final Result   1. Interval placement of endotracheal and orogastric tubes, in proper   positions. 2. Other support apparatus is stable and unchanged. 3. Stable small bilateral pleural effusions. 4. Progressive multifocal airspace opacities within the bilateral mid and   lower lung zones, likely a combination of progressive pulmonary edema and   multifocal pneumonia. XR CHEST PORTABLE   Final Result   Hypoinflated lungs with residual basilar opacities likely representing   atelectasis. The technique and hypoinflation will exaggerate the pulmonary vasculature. IR MIDLINE CATH   Final Result      XR CHEST PORTABLE   Final Result   Stable supportive devices. Increasing vascular congestion/mild pulmonary   edema. XR CHEST PORTABLE   Final Result   Low lung volumes with bibasilar atelectasis. Otherwise no acute process. XR CHEST PORTABLE   Final Result   Supportive tubing is in normal position. Stable left basilar opacity, atelectasis versus airspace disease. XR CHEST PORTABLE   Final Result   Slight improved aeration of the right lung. Bilateral pleuroparenchymal   disease remains         XR CHEST PORTABLE   Final Result   No significant change compared to chest x-ray from 05/01/2022. XR CHEST PORTABLE   Final Result   Low lung volumes with patchy airspace disease which may represent multifocal   atelectasis. Overall stable appearing chest.  Possible trace effusions. XR CHEST PORTABLE   Final Result   Relatively stable appearance of the chest with bibasilar airspace opacities. XR CHEST PORTABLE   Final Result   Low volume study with bilateral atelectasis or airspace disease, similar to   prior, with persistent small pleural effusions. XR CHEST PORTABLE   Final Result   Stable chest.         XR CHEST PORTABLE   Final Result   Endotracheal tube tip projects at the mid intrathoracic trachea. Otherwise   stable chest.         XR CHEST PORTABLE   Final Result   Suboptimal examination due to patient rotation. The chest appears stable. XR CHEST PORTABLE   Final Result   Endotracheal tube tip projects at the thoracic inlet. Otherwise stable chest.         XR CHEST PORTABLE   Final Result   Stable chest.         XR CHEST PORTABLE   Final Result   Stable endotracheal tube located approximately 3.3 cm above the quintin. Low lung volumes. Suspected small right pleural effusion. XR CHEST PORTABLE   Final Result   Endotracheal tube in satisfactory position above the quintin      Bibasilar hypoaeration persist         CT ABDOMEN PELVIS W IV CONTRAST Additional Contrast? None   Final Result   1. Moderate amount of ascites with 3rd spacing including edematous changes   throughout the abdomen and anasarca. 2. Delgado in place. Diffuse bladder wall thickening. Correlate for   underlying cystitis. 3. No acute bowel pathology. Mild gastric distension. Diverticulosis with   no acute features. 4. Mild renal cortical scarring and asymmetric right renal atrophy, stable. No hydronephrosis. CT CHEST PULMONARY EMBOLISM W CONTRAST   Final Result   1. No evidence of acute pulmonary embolism. There is some thickening and   mild irregularity in the pulmonary arteries in the left lower lobe which may   represent chronic pulmonary embolism or secondary appearance to inflammation. No evidence of aortic aneurysm or dissection. 2. Moderate right effusion and right lower lobe atelectatic and/or   consolidative changes. Pneumonia is likely.   There is severe loss of volume   in the right lung. Trace left effusion and left lower lobe atelectatic   changes are seen. 3. Moderate ascites around the spleen and liver. XR CHEST PORTABLE   Final Result   Low lung volumes. Bilateral pleural effusions with bibasilar volume loss,   right greater than left. No overt failure. US GUIDED PARACENTESIS    (Results Pending)   XR CHEST PORTABLE    (Results Pending)     Cultures  Blood - 4/21 - Group A , strep pyogenes and Enterococcus Faecalis   Repeat blood - NG 4/23  Urine - Ecoli , Enterococcus Faecalis   Sputum- Acinetobacter baumannii        Assessment/Plan:    Septic shock. Enterococcus faecalis and strep pyogenes bacteremia. Right lower extremity cellulitis  Chronic pressure ulcers on the back with exposed hardware   Source could be HD line vs exposed hardware  Patient was on vancomycin, Merrem and clindamycin.  changed to  fortaz , tygacil and steffany nebs for acinetobacter. Completed 14 days of IV Vanco. ID managing this. Received 1 dose of Tygacil 5/11  Severe hypotensive shock  needing sammi, vaso, dobutamine and levophed    on hydrocortisone for shock-->weaned off   Critical care managing  Improving hypotension, improved wbc  -Currently on Levophed and vasopressin. End-stage renal disease on hemodialysis. Nephrology consulted  Ongoing CRRT-fluid removal as tolerated     Acute hypoxic resp failure  On mechanical ventilation. 4/22-5/6; reintubated on 5/11/2022  Healthcare associated pneumonia. Respiratory culture shows Acinetobacter vomiting.  -Intubated in the ICU on 4/22-> extubated 5/6. On Airvo and BiPAP  -Antibiotics as above.   -S/p bronch 5/1/22 given worsening leucocytosis/fever  -cx with acinetobacter IV ceftaz,tygacil  and steffany nebs  -> Reintubated on 5/11/22 for worsening hypoxic hypercapnic respiratory failure  -> S/P bronch 5/15   Plan is for tracheostomy and PEG tube placement today.       Chronic systolic congestive heart failure, EF 25 to 30%. Ischemic CM/CAD s/p previous stents   Cardiology consulted  Hold home medications given hypotension  Off dobutamine   Considering to resume BB ,   Resume ASA , statins     Acute metabolic encephalopathy resolved. Secondary to septic shock  resolved     Type 2 diabetes. 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 Nash Tinoco    Paraplegia  Neurogenic bladder    bed bound ,supportive care  - intermittent self catherizations , now has langley     Plans for paracentesis today before PEG tube placement.       Diet: Diet NPO  Code Status: Full Code  DVT prophylaxis-Heparin drip  Dispo - cont care  updated family     Jose Herrmann MD 5/16/2022 1:16 PM

## 2022-05-16 NOTE — PROGRESS NOTES
Report given to Melany FLOWERS. Dr. J Luis Regalado updated on events through the night including changes in wound to back and 10 beat run of VTach. Dressing changed to Right thigh.

## 2022-05-16 NOTE — CONSULTS
[facility] Wound Ostomy Continence Nurse  Follow-up Progress Note       NAME:  Jose [address] RECORD NUMBER:  [id number]  AGE:  49 y.o. GENDER:  male  :  1965  TODAY'S DATE:  2022    Subjective: Pt is s/o trach placment today. He is awake, alert and oriented. Family at bedside. Wound Identification:  Wound Type: pressure  Contributing Factors: chronic pressure and decreased mobility        Patient Goal of Care:  [x] Wound Healing  [] Odor Control  [] Palliative Care  [] Pain Control   [] Other:    Pt seen for follow up for DTI on back and sacrum. All other wounds seen on . Photos in epic. Objective:    BP (!) 102/56   Pulse 81   Temp 97.9 °F (36.6 °C) (Bladder)   Resp 16   Ht 6' 2\" (1.88 m)   Wt 233 lb (105.7 kg) Comment: new bed  SpO2 98%   BMI 29.92 kg/m²   Ismael Risk Score: Ismael Scale Score: 13  Assessment:   Measurements:  Wound 21 #82, Left Posterior Lower Leg Proximal, pressure Unstageable,, onset 2021 (Active)   Wound Image    22 1945   Wound Etiology Pressure Unstageable 22 1200   Dressing Status Clean;Dry; Intact 22 1200   Wound Cleansed Wound cleanser 05/15/22 1606   Dressing/Treatment Alginate with Ag; Foam 22 1200   Dressing Change Due 22 1200   Wound Length (cm) 1.5 cm 22 1406   Wound Width (cm) 0.5 cm 22 1406   Wound Depth (cm) 0.1 cm 22 1406   Wound Surface Area (cm^2) 0.75 cm^2 22 1406   Change in Wound Size % (l*w) -525 22 1406   Wound Volume (cm^3) 0.075 cm^3 22 1406   Wound Healing % -525 22 1406   Distance Tunneling (cm) 0 cm 22 1406   Undermining Maxium Distance (cm) 0 22 1406   Wound Assessment Pink/red 22 1200   Drainage Amount Scant 22 1200   Drainage Description Serosanguinous 22 1200   Odor None 22 1200   Chetna-wound Assessment Blanchable erythema 22 1200   Number of days: 192       Wound 11/05/21 # 81, mid-back, Surgical, full thickness, onset June 2015 (Active)   Wound Image   05/16/22 0230   Wound Etiology Surgical 05/16/22 1200   Dressing Status Clean;Dry; Intact 05/16/22 1200   Wound Cleansed Wound cleanser 05/15/22 1606   Dressing/Treatment Alginate with Ag; Foam 05/16/22 1200   Dressing Change Due 05/16/22 05/16/22 1200   Wound Length (cm) 9.7 cm 04/20/22 1406   Wound Width (cm) 10 cm 04/20/22 1406   Wound Depth (cm) 0.3 cm 04/20/22 1406   Wound Surface Area (cm^2) 97 cm^2 04/20/22 1406   Change in Wound Size % (l*w) -134.98 04/20/22 1406   Wound Volume (cm^3) 29.1 cm^3 04/20/22 1406   Wound Healing % -17 04/20/22 1406   Wound Assessment Exposed hardware;Pale granulation tissue;Slough 05/16/22 1200   Drainage Amount Small 05/16/22 1200   Drainage Description Serosanguinous 05/16/22 1200   Odor Mild 05/16/22 1200   Chetna-wound Assessment Non-blanchable erythema 05/16/22 1200   Number of days: 192       Wound 04/01/22 #85, Left Pretib, Trauma, Full Thickness, Onset 3/2022 (Active)   Wound Image   04/21/22 1945   Wound Etiology Traumatic 05/16/22 1200   Dressing Status Clean;Dry; Intact 05/16/22 1200   Wound Cleansed Wound cleanser 05/15/22 1606   Dressing/Treatment Alginate with Ag; Foam 05/16/22 1200   Dressing Change Due 05/18/22 05/16/22 1200   Wound Length (cm) 1 cm 04/20/22 1406   Wound Width (cm) 1 cm 04/20/22 1406   Wound Depth (cm) 0.2 cm 04/20/22 1406   Wound Surface Area (cm^2) 1 cm^2 04/20/22 1406   Change in Wound Size % (l*w) -150 04/20/22 1406   Wound Volume (cm^3) 0.2 cm^3 04/20/22 1406   Wound Healing % -400 04/20/22 1406   Wound Assessment Pink/red 05/16/22 1200   Drainage Amount Small 05/16/22 1200   Drainage Description Serous 05/16/22 1200   Odor None 05/16/22 1200   Chetna-wound Assessment Hyperpigmented 05/16/22 1200   Margins Attached edges 04/20/22 1406   Wound Thickness Description not for Pressure Injury Full thickness 04/20/22 1406   Number of days: 45   Back: [id number], 100% dark and pale purple nonblanchable, moist  Tissue, deep tissue injury. Sacrum:  9x9x0.1cm, 85% purple, nonblanchable tissue, 15% pink moist, open tissue. Deep tissue injury. Left groin and abdominal folds:  Intertrigo present, utilize Interdry ag and cleansing with foam cleanser and patting dry. Response to treatment:  Well tolerated by patient. Pain Assessment:  Severity:  0 / 10  Quality of pain: N/A  Wound Pain Timing/Severity: none  Premedicated: N/A  Plan:  Replacement Fluid immersion surface in route  Back and sacrum deep tissue injuries:  Apply Foam dressings, change every other day and prn. Continue to turn and reposition patient even on specialty surface.      Plan of Care: Wound 04/01/22 #85, Left Pretib, Trauma, Full Thickness, Onset 3/2022-Dressing/Treatment: Alginate with Ag,Foam  Wound 11/05/21 #82, Left Posterior Lower Leg Proximal, pressure Unstageable,, onset 11/02/2021-Dressing/Treatment: Alginate with Ag,Foam  Wound 11/05/21 # 81, mid-back, Surgical, full thickness, onset June 2015-Dressing/Treatment: Alginate with Ag,Foam    Specialty Bed Required : Yes   [] Low Air Loss   [] Pressure Redistribution  [] Fluid Immersion  [] Bariatric  [] Total Pressure Relief  [] Other:     Current Diet: Diet NPO  Dietician consult:  Yes    Discharge Plan:  Placement for patient upon discharge: skilled nursing   Patient appropriate for Outpatient [address]: Yes    Referrals:  []   [] [address] [place] [address]  [] Supplies  [] Other    Patient/Caregiver Teaching:  Level of patient/caregiver understanding able to:   [] Indicates understanding       [] Needs reinforcement  [] Unsuccessful      [] Verbal Understanding  [] Demonstrated understanding       [] No evidence of learning  [] Refused teaching         [] N/A       Electronically signed by Aleksandra Lino RN, [employer] on 5/16/2022 at 6:09 PM

## 2022-05-17 NOTE — PROGRESS NOTES
05/16/22 2241   Patient Observation   Pulse 88   Resp 16   SpO2 96 %   Breath Sounds   Right Upper Lobe Diminished   Right Middle Lobe Diminished   Right Lower Lobe Diminished   Left Upper Lobe Diminished   Left Lower Lobe Diminished   Airway Clearance   Suction ET Tube   Suction Device Inline suction catheter   Sputum Method Obtained Tracheal   Sputum Amount Scant   Sputum Color/Odor White   Sputum Consistency Thick   Vent Information   Vent Mode AC/VC   Vent Settings   FiO2  40 %   Resp Rate (Set) 16 bmp   Vt (Set, mL) 490 mL   PEEP/CPAP (cmH2O) 5   Trigger Sensitivity Flow (L/min) 3 L/min   Vent Patient Data (Readings)   Vt Exhaled 510 mL   Rate Measured 16 br/min   Minute Volume 8.4 Liters   Peak Flow 60 L/min   I:E Ratio 1:2.5   Sensitivity 3   Peak Inspiratory Pressure 38 cmH2O   Mean Airway Pressure 13 cmH20   Plateau Pressure (cm H2O) 25 cm H2O   Vent Alarm Settings   High Pressure  50 cmH2O   Ve Max 20   Ve Min 3   Vt Low  300 mL   Vt High  1000 mL   RR High 40 br/min   RR Low  24   Apnea (Secs) 20 secs   Ambu Bag With Mask At Bedside Yes   Additional Respiratory Assessments   Position Semi-Mejía's   Humidification Source Heated wire   Humidification Temp 37   Circuit Condensation Drained   Surgical Airway (Trach) 05/16/22 Shiley Cuffed   Placement Date/Time: 05/16/22 0946   Present on Admission/Arrival: No  Placed By: In surgery  Surgical Airway Type: Tracheostomy  Brand: Dao  Style: (c) Cuffed  Size (mm): 6   Status Secured   Site Assessment Clean;Dry   Ties Assessment Clean;Dry; Intact

## 2022-05-17 NOTE — PROGRESS NOTES
Argatroban Protocol:  APTT at 1630 is therapeutic at 83.8Sec. No changes. Continue infusion at 3.2mL/hr and recheck at 2030. Desired aPTT range is 60-90 seconds.   MATTHEW Ramirez HARPER [facility] PharmD 5/17/2022 5:36 PM

## 2022-05-17 NOTE — PROGRESS NOTES
Patient assessment complete, lung sounds diminished, new tracheostomy, site WNL, Ventilator now FiO2 40% with PEEP of 5. Scant secretions, oral care complete. Patient A/O, pain medication requested and given. Night medications given through new PEG tube, flushed well. CRRT goal to keep even, filter changed today, tunneled vas cath site CDI. Patient on no sedation, has vasopressin and levophed for blood pressure support. No heparin drip. Tube feeding to start tomorrow. Blood sugar 123, lantus held. Patient placed on new bed with Dolphin mattress today, bed flat again at back, fault indicator flashing, attempted to problem solve. Possible that only the pump was replaced and same mattress used as yesterday. Patient changed to regular ICU bed, Agility called for new pump and mattress.
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
## 2022-05-17 NOTE — PROGRESS NOTES
Hospitalist Progress Note      PCP: Mabel Felty, MD    Date of Admission: 4/21/2022    Subjective:     49 y.o. male with hx of ischemic CM, paraplegia, IDDM, ESRD on HD presents with bacteremia and decubitus ulcers, septic shock     Improving sepsis from last week, off sammi, vaso, dobutamine, now remains on levophed,      Ongoing CRRT with fluid removal   No fevers    5/11  Patient was on Airvo and BiPAP-> worsening hypoxemia-> intubated. Continued on Levophed   Continued on CRRT     5/14  Continues to require pressors  On CRRT   Remains on mechanical vent. FiO2 45%. Awake and alert. Plan is for tracheostomy and PEG tube placement on Monday      5/15  Worsening hypoxemia today with FiO2 up to 60% and PEEP up to 8, s/p bronchoscopy with aspiration of mucus plugs . Currently FiO2 is at 50% PEEP is at 8 .    5/16-plans for tracheostomy and PEG tube today. Awake and alert on the vent. Air mattress is apparently not working. Pressure injury to the back. 5/17- had tracheostomy and PEG placed on 5/17/2022. Had a DTI of the back. Still on pressors and has been difficult to wean off. On CRRT, Heparin was stopped for procedure.       Medications:  Reviewed    Infusion Medications    vasopressin (Septic Shock) infusion 0.03 Units/min (05/17/22 0057)    dextrose      prismaSATE BGK 4/2.5 500 mL/hr at 05/16/22 2059    prismaSATE BGK 4/2.5 500 mL/hr at 05/16/22 2059    prismaSATE BGK 4/2.5 500 mL/hr at 05/16/22 2059    norepinephrine 10 mcg/min (05/17/22 0631)    sodium chloride Stopped (05/10/22 8131)     Scheduled Medications    midodrine  15 mg Oral TID WC    sodium chloride (Inhalant)  4 mL Nebulization Q12H    insulin glargine  15 Units SubCUTAneous Nightly    sodium chloride  500 mL IntraVENous Once    colistimethate (COLY-MYCIN) nebulization  150 mg Nebulization Q8H    collagenase   Topical Daily    sodium chloride  1,000 mL IntraVENous Once    tigecycline (TYGACIL) IVPB  50 mg IntraVENous Q12H    epoetin bebo-epbx  3,000 Units SubCUTAneous Once per day on Mon Wed Fri    ipratropium-albuterol  1 ampule Inhalation Q4H While awake    sennosides-docusate sodium  2 tablet Oral BID    insulin lispro  0-18 Units SubCUTAneous Q4H    pantoprazole  40 mg IntraVENous Daily    menthol-zinc oxide   Topical Daily    aspirin  81 mg Oral Nightly    sodium chloride flush  5-40 mL IntraVENous 2 times per day     PRN Meds: fentanNYL, sodium phosphate IVPB **OR** sodium phosphate IVPB **OR** sodium phosphate IVPB **OR** sodium phosphate IVPB, oxyCODONE-acetaminophen, traZODone, ipratropium-albuterol, dextrose bolus **OR** dextrose bolus, glucagon (rDNA), dextrose, potassium chloride, magnesium sulfate, calcium gluconate **OR** calcium gluconate **OR** calcium gluconate **OR** calcium gluconate, glucose, midazolam, sodium chloride flush, sodium chloride, ondansetron **OR** ondansetron, polyethylene glycol, acetaminophen **OR** acetaminophen      Intake/Output Summary (Last 24 hours) at 5/17/2022 1059  Last data filed at 5/17/2022 0600  Gross per 24 hour   Intake 2319.9 ml   Output 1627 ml   Net 692.9 ml       Physical Exam Performed:    BP 87/61   Pulse 82   Temp 99.5 °F (37.5 °C) (Bladder)   Resp 16   Ht 6' 2\" (1.88 m)   Wt 233 lb (105.7 kg) Comment: new bed  SpO2 96%   BMI 29.92 kg/m²       General appearance: intubated, on mechanical ventilation. Responding. Chronically ill-appearing  O2 sats stable at FiO2 45%  Ongoing CRRT  HEENT: NAD, pupils reactive to light  Neck: Supple, . No jugular venous distention. Tracheostomy present. Respiratory: Diminished breath sounds bilaterally,  Clear, no rhonchi today  Cardiovascular: S 1 s2 heard,  Right chest HD catheter and Port noted  Abdomen: Soft, non-tender, +distended with normal bowel sounds. Colostomy noted in left LQ/ PEG present  Musculoskeletal: Right BKA with stump healthy.  Superficial skin ulcers on right thigh with no active infection noted  Left LE edema with superficial ulceration noted   Skin: see Epic wound pictures, chronic exposed hardware in lower thoracic and lumbar region             Neurologic: Awake, intubated  paraplegic , atrophy of forearm and hand muscles   Fully awake  alert oriented    Labs:   Recent Labs     05/15/22  0320 05/16/22  0400 05/17/22  0415   WBC 10.5 10.8 12.1*   HGB 9.1* 9.0* 9.1*   HCT 28.4* 29.0* 29.2*   PLT 89* 72* 86*     Recent Labs     05/16/22  1740 05/17/22  0100 05/17/22  0800   * 131* 129*   K 4.0 3.9 4.1   CL 97* 97* 95*   CO2 26 24 24   BUN 16 17 18   CREATININE <0.5* 0.7* 0.7*   CALCIUM 8.4 8.1* 8.3   PHOS 2.1* 2.6 2.5     No results for input(s): AST, ALT, BILIDIR, BILITOT, ALKPHOS in the last 72 hours. Recent Labs     05/16/22  0742   INR 1.48*     No results for input(s): Jadene Moh in the last 72 hours. Urinalysis:      Lab Results   Component Value Date    NITRU Negative 04/21/2022    WBCUA 21-50 04/21/2022    BACTERIA 4+ 04/21/2022    RBCUA  04/21/2022    BLOODU LARGE 04/21/2022    SPECGRAV >=1.030 04/21/2022    GLUCOSEU Negative 04/21/2022    GLUCOSEU >=1000 mg/dL 08/31/2010       Radiology:  XR CHEST PORTABLE   Final Result   Bilateral pleuroparenchymal disease, decreased on the left, but increased on   the right         [address]   Final Result   Successful paracentesis. XR CHEST PORTABLE   Final Result   Endotracheal tube remains approximately 3 cm above the quintin. Poor inspiration with basilar airspace disease and possible effusion   primarily on the left. XR CHEST PORTABLE   Final Result   Persisting left lower lobe airspace disease with volume loss or collapse. Mucous plugging considered. Improving aeration right lower lobe. XR CHEST PORTABLE   Final Result   Persistent pleural-parenchymal disease at the lung bases, similar to prior.          XR CHEST PORTABLE   Final Result   Improving left lower lobe consolidation XR CHEST PORTABLE   Final Result   1. Unchanged position of support devices. 2. Vascular congestion, basilar opacities and small pleural effusions are   again demonstrated and without significant change. CT ABDOMEN PELVIS W IV CONTRAST Additional Contrast? None   Final Result   Increasing opacifications of the lung bases with small to moderate pleural   effusions right greater than left and adjacent opacifications which appear   greatest in the left lower lobe of worsening airspace disease and/or   atelectasis from prior comparison 04/21/2022      Similar appearance of small to moderate volume abdominopelvic ascites without   loculated or heterogeneous fluid collection otherwise evident greatest in the   perihepatic and perisplenic regions         XR CHEST PORTABLE   Final Result   1. Interval placement of endotracheal and orogastric tubes, in proper   positions. 2. Other support apparatus is stable and unchanged. 3. Stable small bilateral pleural effusions. 4. Progressive multifocal airspace opacities within the bilateral mid and   lower lung zones, likely a combination of progressive pulmonary edema and   multifocal pneumonia. XR CHEST PORTABLE   Final Result   Hypoinflated lungs with residual basilar opacities likely representing   atelectasis. The technique and hypoinflation will exaggerate the pulmonary vasculature. IR MIDLINE CATH   Final Result      XR CHEST PORTABLE   Final Result   Stable supportive devices. Increasing vascular congestion/mild pulmonary   edema. XR CHEST PORTABLE   Final Result   Low lung volumes with bibasilar atelectasis. Otherwise no acute process. XR CHEST PORTABLE   Final Result   Supportive tubing is in normal position. Stable left basilar opacity, atelectasis versus airspace disease. XR CHEST PORTABLE   Final Result   Slight improved aeration of the right lung.   Bilateral pleuroparenchymal   disease remains         XR CHEST PORTABLE   Final Result   No significant change compared to chest x-ray from 05/01/2022. XR CHEST PORTABLE   Final Result   Low lung volumes with patchy airspace disease which may represent multifocal   atelectasis. Overall stable appearing chest.  Possible trace effusions. XR CHEST PORTABLE   Final Result   Relatively stable appearance of the chest with bibasilar airspace opacities. XR CHEST PORTABLE   Final Result   Low volume study with bilateral atelectasis or airspace disease, similar to   prior, with persistent small pleural effusions. XR CHEST PORTABLE   Final Result   Stable chest.         XR CHEST PORTABLE   Final Result   Endotracheal tube tip projects at the mid intrathoracic trachea. Otherwise   stable chest.         XR CHEST PORTABLE   Final Result   Suboptimal examination due to patient rotation. The chest appears stable. XR CHEST PORTABLE   Final Result   Endotracheal tube tip projects at the thoracic inlet. Otherwise stable chest.         XR CHEST PORTABLE   Final Result   Stable chest.         XR CHEST PORTABLE   Final Result   Stable endotracheal tube located approximately 3.3 cm above the quintin. Low lung volumes. Suspected small right pleural effusion. XR CHEST PORTABLE   Final Result   Endotracheal tube in satisfactory position above the quintin      Bibasilar hypoaeration persist         CT ABDOMEN PELVIS W IV CONTRAST Additional Contrast? None   Final Result   1. Moderate amount of ascites with 3rd spacing including edematous changes   throughout the abdomen and anasarca. 2. Delgado in place. Diffuse bladder wall thickening. Correlate for   underlying cystitis. 3. No acute bowel pathology. Mild gastric distension. Diverticulosis with   no acute features. 4. Mild renal cortical scarring and asymmetric right renal atrophy, stable. No hydronephrosis.          CT CHEST PULMONARY EMBOLISM W CONTRAST Final Result   1. No evidence of acute pulmonary embolism. There is some thickening and   mild irregularity in the pulmonary arteries in the left lower lobe which may   represent chronic pulmonary embolism or secondary appearance to inflammation. No evidence of aortic aneurysm or dissection. 2. Moderate right effusion and right lower lobe atelectatic and/or   consolidative changes. Pneumonia is likely. There is severe loss of volume   in the right lung. Trace left effusion and left lower lobe atelectatic   changes are seen. 3. Moderate ascites around the spleen and liver. XR CHEST PORTABLE   Final Result   Low lung volumes. Bilateral pleural effusions with bibasilar volume loss,   right greater than left. No overt failure. XR CHEST PORTABLE    (Results Pending)     Cultures  Blood - 4/21 - Group A , strep pyogenes and Enterococcus Faecalis   Repeat blood - NG 4/23  Urine - Ecoli , Enterococcus Faecalis   Sputum- Acinetobacter baumannii        Assessment/Plan:    Septic shock. Enterococcus faecalis and strep pyogenes bacteremia. Right lower extremity cellulitis  Chronic pressure ulcers on the back with exposed hardware   Source could be HD line vs exposed hardware  Patient was on vancomycin, Merrem and clindamycin.  changed to  fortaz , tygacil and steffany nebs for acinetobacter. Completed 14 days of IV Vanco. ID managing this. Received 1 dose of Tygacil 5/11  Severe hypotensive shock  needing sammi, vaso, dobutamine and levophed    on hydrocortisone for shock-->weaned off   Critical care managing  Improving hypotension, improved wbc  -Currently on Levophed and vasopressin. Has been difficult to wean off. End-stage renal disease on hemodialysis. Nephrology consulted  Ongoing CRRT-fluid removal as tolerated     Acute hypoxic resp failure  On mechanical ventilation. 4/22-5/6; reintubated on 5/11/2022  Healthcare associated pneumonia.   Respiratory culture shows Acinetobacter vomiting.  -Intubated in the ICU on 4/22-> extubated 5/6. On Airvo and BiPAP  -Antibiotics as above. -S/p bronch 5/1/22 given worsening leucocytosis/fever  -cx with acinetobacter IV ceftaz,tygacil  and steffany nebs  -> Reintubated on 5/11/22 for worsening hypoxic hypercapnic respiratory failure  -> S/P bronch 5/15   PEG and tracheostomy placed on 5/16/2022.       Chronic systolic congestive heart failure, EF 25 to 30%. Ischemic CM/CAD s/p previous stents   Cardiology consulted  Hold home medications given hypotension  Off dobutamine   Considering to resume BB ,   Resume ASA , statins     Acute metabolic encephalopathy resolved. Secondary to septic shock  resolved     Type 2 diabetes. Lantus insulin and sliding scale insulin     History of DVT  Heparin drip    Chronic wounds to low back, thoracic spine, ischium  DTI noted. -[facility] by Dr. Joss Daneille    Paraplegia  Neurogenic bladder    bed bound ,supportive care  - intermittent self catherizations , now has langley     Resume heparin. Consider Eliquis once we use the PEG.       Diet: Diet NPO  ADULT TUBE FEEDING; PEG; Renal Formula; Continuous; 20; Yes; 15; Q 4 hours; 35; 30; Q 4 hours; Protein; one proteinex P2Go TWICE daily via feeding tube  Code Status: Full Code  DVT prophylaxis-Heparin drip  Dispo - cont care  updated family     Mylene Bethea MD 5/17/2022 10:59 AM

## 2022-05-17 NOTE — PROGRESS NOTES
05/17/22 1909   Patient Observation   Pulse 108   Resp 17   SpO2 97 %   Vent Information   Vent Mode AC/VC   Vent Settings   FiO2  35 %   Resp Rate (Set) 16 bmp   Vt (Set, mL) 490 mL   PEEP/CPAP (cmH2O) 5   Vent Patient Data (Readings)   Vt Exhaled 1029 mL   Rate Measured 31 br/min   Minute Volume 9.37 Liters   Peak Flow 50 L/min   Pressure Support 0 cmH20   I:E Ratio 1:2.50   Sensitivity 2   Peak Inspiratory Pressure 47 cmH2O   Mean Airway Pressure 17 cmH20   High Peep/I Pressure 0   I Time/ I Time % 0 s   Plateau Pressure (cm H2O) 26 cm H2O   Vent Alarm Settings   High Pressure  50 cmH2O   Low Minute Volume Alarm 3 L/min   RR High 40 br/min

## 2022-05-17 NOTE — PROGRESS NOTES
Pt repositioned x2 assist. Pt c/o pain & is irritated with repositioning- RN educated on importance of repositioning & prevention of further skin breakdown.    Claudia Valencia RN, BSN
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
## 2022-05-17 NOTE — PROGRESS NOTES
Dolphin mattress not inflating on left mid upper area. Checked air onofre lines and reset pump without change. Patient was transferred to an ICU low air-loss bed. Call placed to [address] [place] [address] to notify of defect in mattress. They are going to send someone out to replace.      Electronically signed by Sandy Deutsch RN on 5/16/2022 at 9:04 PM

## 2022-05-17 NOTE — PROGRESS NOTES
Patient transferred to new bed with new Dolphin mattress and pump. Back dressing replaced, increase in redness now above exposed hardware site, dressing to right thigh also changed. Updated Dr. Carlito Maldonado, reorders heparin drip. Pharmacy called. Pain medication given for back pain. CXR present for xray. Cathflo dwelling. Report given to Melany FLOWERS.
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
## 2022-05-17 NOTE — PROGRESS NOTES
05/17/22 1859   Patient Observation   Pulse 97   Resp 20   SpO2 95 %   Observations #6 xlt prox. ambu bag at head of bed.  water good.     Breath Sounds   Right Upper Lobe Diminished   Right Middle Lobe Diminished   Right Lower Lobe Diminished   Left Upper Lobe Diminished   Left Lower Lobe Diminished   Airway Clearance   Suction Trach   Suction Device Inline suction catheter   Sputum Method Obtained Tracheal   Sputum Amount Scant   Sputum Color/Odor White   Sputum Consistency Thick   Vent Information   Vent Mode PS/CPAP   Vent Settings   FiO2  35 %   Resp Rate (Set) 0 bmp   Vt (Set, mL) 0 mL   PEEP/CPAP (cmH2O) 5   Vent Patient Data (Readings)   Vt Exhaled 341 mL   Rate Measured 22 br/min   Minute Volume 7.33 Liters   Peak Flow 0 L/min   Pressure Support 15 cmH20   I:E Ratio 1:2.20   Sensitivity 2   Peak Inspiratory Pressure 20 cmH2O   Mean Airway Pressure 9.3 cmH20   High Peep/I Pressure 0   I Time/ I Time % 0 s   Plateau Pressure (cm H2O) 26 cm H2O   Vent Alarm Settings   High Pressure  50 cmH2O   Low Minute Volume Alarm 3 L/min   RR High 40 br/min

## 2022-05-17 NOTE — PROGRESS NOTES
RT Nebulizer Bronchodilator Protocol Note    There is a bronchodilator order in the chart from a provider indicating to follow the RT Bronchodilator Protocol and there is an Initiate RT Bronchodilator Protocol order as well (see protocol at bottom of note). CXR Findings:  XR CHEST PORTABLE    Result Date: 5/17/2022  Bilateral pleuroparenchymal disease, decreased on the left, but increased on the right     XR CHEST PORTABLE    Result Date: 5/16/2022  Endotracheal tube remains approximately 3 cm above the quintin. Poor inspiration with basilar airspace disease and possible effusion primarily on the left. The findings from the last RT Protocol Assessment were as follows:  Smoking: (P) Chronic pulmonary disease  Respiratory Pattern: (P) Regular pattern and RR 12-20 bpm  Breath Sounds: (P) Slightly diminished and/or crackles  Cough: (P) Weak, non-productive  Indication for Bronchodilator Therapy: (P) On home bronchodilators  Bronchodilator Assessment Score: (P) 7    Aerosolized bronchodilator medication orders have been revised according to the RT Nebulizer Bronchodilator Protocol below. Respiratory Therapist to perform RT Therapy Protocol Assessment initially then follow the protocol. Repeat RT Therapy Protocol Assessment PRN for score 0-3 or on second treatment, BID, and PRN for scores above 3. No Indications - adjust the frequency to every 6 hours PRN wheezing or bronchospasm, if no treatments needed after 48 hours then discontinue using Per Protocol order mode. If indication present, adjust the RT bronchodilator orders based on the Bronchodilator Assessment Score as indicated below. If a patient is on this medication at home then do not decrease Frequency below that used at home. 0-3 - enter or revise RT bronchodilator order(s) to equivalent RT Bronchodilator order with Frequency of every 4 hours PRN for wheezing or increased work of breathing using Per Protocol order mode.        4-6 - enter or revise RT Bronchodilator order(s) to two equivalent RT bronchodilator orders with one order with BID Frequency and one order with Frequency of every 4 hours PRN wheezing or increased work of breathing using Per Protocol order mode. 7-10 - enter or revise RT Bronchodilator order(s) to two equivalent RT bronchodilator orders with one order with TID Frequency and one order with Frequency of every 4 hours PRN wheezing or increased work of breathing using Per Protocol order mode. 11-13 - enter or revise RT Bronchodilator order(s) to one equivalent RT bronchodilator order with QID Frequency and an Albuterol order with Frequency of every 4 hours PRN wheezing or increased work of breathing using Per Protocol order mode. Greater than 13 - enter or revise RT Bronchodilator order(s) to one equivalent RT bronchodilator order with every 4 hours Frequency and an Albuterol order with Frequency of every 2 hours PRN wheezing or increased work of breathing using Per Protocol order mode. RT to enter RT Home Evaluation for COPD & MDI Assessment order using Per Protocol order mode.     Electronically signed by Lala Jade RCP on 5/17/2022 at 4:57 PM
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
## 2022-05-17 NOTE — PROGRESS NOTES
Pt CRRT stopped- cath flow dwelling. Dr. Gunjan Coronado here on unit- will discuss resuming therapy.      Concepcion Perkins RN, BSN
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
## 2022-05-17 NOTE — CARE COORDINATION
INTERDISCIPLINARY PLAN OF CARE CONFERENCE    Date/Time: 5/17/2022 11:09 AM  Completed by: CONRAD Negro   Case Management    Patient Name:  Richmond Vazquez  YOB: 1965  Admitting Diagnosis: Hyperkalemia [E87.5]  Hypoglycemia [E16.2]  ESRD on dialysis ([facility] [address] [N18.6, Z99.2]  Acute cystitis with hematuria [N30.01]  Septic shock ([facility] [address] [A41.9, R65.21]  Sepsis ([facility] [address] [A41.9]  Pneumonia due to infectious organism, unspecified laterality, unspecified part of lung [J18.9]     Admit Date/Time:  4/21/2022  1:21 PM    Chart reviewed. Interdisciplinary team contacted or reviewed plan related to patient progress and discharge plans. Disciplines included Case Management, Nursing, and Dietitian. Current Status: Ongoing   PT/OT recommendation for discharge plan of care: n/a    Expected D/C Disposition:  LTACH vs TBD    Discharge Plan Comments: Chart review completed.  Message left for pt's wife Rachell requesting a call back to follow up on LTACH discussion    Home O2 in place on admit: Yes

## 2022-05-17 NOTE — PROGRESS NOTES
Patient access line extremely negative occlusion, sites flushed, no blood return on blue port, flushed easily, clot noted in tip of lines and in filter. Blood not returned to patient.   Dr. Kyle Escobar notified, cathflo ordered to dwell for 6 hours, Dr. Kyle Escobar states he will be in at 11am.

## 2022-05-17 NOTE — PROGRESS NOTES
Pharmacy: [facility]  HIT positive, awaiting CHARLI  Plt 86 , wt 106kg  Ptt @ 1330= 47.2 sec  Will begin infusion at 0.5mcg/kg/min (3.2 ml/hr), get Ptt in 2 hours. Goal aPtt= 60-90 secs    Pharmacy will continue to monitor. Dale Cormier Pharm D 5/17/20222:27 PM  .     .

## 2022-05-17 NOTE — FLOWSHEET NOTE
05/17/22 0332   Vital Signs   Pulse 83   Resp (!) 0   BP (!) 74/63   MAP (Calculated) 66.67   Patient repositioned, requests pain medication for back pain, BP low just after given medication per PEG tube, calcium gluconate infusing, attempted to remove amount of electrolyte replacement volume per CRRT, decreased rate due to low BP also increased levophed to 15, will continue to monitor, oral care complete.

## 2022-05-17 NOTE — PROGRESS NOTES
Pt placed on PS trial of 15/5, FiO2 35%  Will monitor        05/17/22 1127   NICU Vent Information   Vt (Set, mL) 0 mL   Vt Exhaled 347 mL   Resp Rate (Set) 0 bmp   Rate Measured 24 br/min   Minute Volume 8.08 Liters   Peak Flow 0 L/min   Pressure Support 15 cmH20   FiO2  30 %   Peak Inspiratory Pressure 20 cmH2O   I:E Ratio 1:3.60   Sensitivity 2   High Peep/I Pressure 0   PEEP/CPAP (cmH2O) 5   I Time/ I Time % 0 s   Mean Airway Pressure 9.5 cmH20   Additional Respiratory Assessments   Pulse 112   Resp 21   SpO2 98 %   Vent Alarm Settings   High Pressure  50 cmH2O   Low Minute Volume Alarm 3 L/min   RR High 40 br/min

## 2022-05-17 NOTE — PROGRESS NOTES
Patient doing well, SaO2 96%, FiO2 40% with PEEP of 5. Lung sounds diminished, scant secretions when suctioned, oral care complete. Patient turned and repositioned, requests pain medication, fentanyl given. No new drainage noted on PEG tube dressing or to trach area. Labs drawn from port without difficulty. Will continue to monitor.

## 2022-05-17 NOTE — PROGRESS NOTES
05/17/22 1909   Patient Observation   Pulse 108   Resp 17   SpO2 97 %   Breath Sounds   Right Upper Lobe Diminished   Right Middle Lobe Diminished   Right Lower Lobe Diminished   Left Upper Lobe Diminished   Left Lower Lobe Diminished   Vent Information   Vent Mode AC/VC   Vent Settings   FiO2  35 %   Resp Rate (Set) 16 bmp   Vt (Set, mL) 490 mL   PEEP/CPAP (cmH2O) 5   Vent Patient Data (Readings)   Vt Exhaled 1029 mL   Rate Measured 31 br/min   Minute Volume 9.37 Liters   Peak Flow 50 L/min   Pressure Support 0 cmH20   I:E Ratio 1:2.50   Sensitivity 2   Peak Inspiratory Pressure 47 cmH2O   Mean Airway Pressure 17 cmH20   High Peep/I Pressure 0   I Time/ I Time % 0 s   Plateau Pressure (cm H2O) 26 cm H2O   Static Compliance (L/cm H2O) 23   Dynamic Compliance (L/cm H2O) 57 L/cm H2O   Airway Resistance 25   Vent Alarm Settings   High Pressure  50 cmH2O   Low Minute Volume Alarm 3 L/min   RR High 40 br/min

## 2022-05-17 NOTE — PROGRESS NOTES
05/17/22 0350   Patient Observation   Pulse 79   Resp 13   SpO2 100 %   Breath Sounds   Right Upper Lobe Diminished   Right Middle Lobe Diminished   Right Lower Lobe Diminished   Left Upper Lobe Diminished   Left Lower Lobe Diminished   Airway Clearance   Suction ET Tube   Subglottic Suction Done Yes   Suction Device Inline suction catheter   Sputum Method Obtained Tracheal   Sputum Amount Scant   Sputum Color/Odor White   Sputum Consistency Thick   Vent Information   Vent Mode AC/VC   Vent Settings   FiO2  40 %   Resp Rate (Set) 16 bmp   Vt (Set, mL) 490 mL   PEEP/CPAP (cmH2O) 5   Trigger Sensitivity Flow (L/min) 3 L/min   Vent Patient Data (Readings)   Vt Exhaled 512 mL   Rate Measured 16 br/min   Minute Volume 8.1 Liters   Peak Flow 60 L/min   Pressure Support 0 cmH20   I:E Ratio 1:2.5   Sensitivity 3   Peak Inspiratory Pressure 30 cmH2O   Mean Airway Pressure 12 cmH20   Plateau Pressure (cm H2O) 24 cm H2O   Vent Alarm Settings   Low Minute Volume Alarm 3 L/min   Ve Max 20   Vt Low  300 mL   Vt High  1000 mL   RR High 40 br/min   Apnea (Secs) 20 secs   Ambu Bag With Mask At Bedside Yes   Additional Respiratory Assessments   Position Semi-Mejía's   Humidification Source Heated wire   Humidification Temp 37   Circuit Condensation Drained   Surgical Airway (Trach) 05/16/22 Shiley Cuffed   Placement Date/Time: 05/16/22 0946   Present on Admission/Arrival: No  Placed By: In surgery  Surgical Airway Type: Tracheostomy  Brand: Dao  Style: (c) Cuffed  Size (mm): 6   Status Secured   Site Assessment Clean;Dry   Ties Assessment Clean;Dry; Intact

## 2022-05-17 NOTE — PROGRESS NOTES
High risk vesicant drug infusing:  ____x______    Multiple incompatible medications infusing:  _________    CVP Monitoring:  _________    Extremely difficult IV access challenge:  ___x_____    Continued need for central line access:  _____x_____    Addressed with physician:  _x_______    RIGHT PATIENT, RIGHT TIME, RIGHT LINE
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
## 2022-05-18 NOTE — PROGRESS NOTES
Argatroban drip continues to be off (since 5/18 @ 0205). APTT @ 0830 = 98.6 sec. (did not result until 1019)     Next aPTT 5/18 @ 1030. Will restart drip once aPTT falls below 90.1 sec.      Patel Coello PharmD, [facility], 5/18/2022 10:30 AM

## 2022-05-18 NOTE — PROGRESS NOTES
Morning assessment complete. Patient seen awake in bed. Patient is A/O x 4. Patient with trach in place. Shellia Pain is # 6 Shiley XLT, RT has been at bedside and patient is on SBT. Patient with c/o pain 6/10, PRN medications given. Physical assessment as charted in flow sheets. Scheduled medications given per orders. LEVOPHED infusing at a rate of 6 mcg/min. VASOPRESSIN infusing at 0.03 units/min    Central line dressing is clean, dry and intact with no signs of drainage. All tubing is current and dated. IPA caps applied to all access hubs. Delgado intact and secure, no urine draining. Stoma pink and moist, ostomy pouch in place with liquid brown stool. Patient with no further needs voiced at this time. Patient Repositioned for comfort.

## 2022-05-18 NOTE — PROGRESS NOTES
05/18/22 0243   NICU Vent Information   Vent Type 980   Vent Mode AC/VC   Vt (Set, mL) 490 mL   Vt Exhaled 506 mL   Resp Rate (Set) 16 bmp   Rate Measured 16 br/min   Minute Volume 8.11 Liters   Peak Flow 50 L/min   Pressure Support 0 cmH20   FiO2  35 %   Peak Inspiratory Pressure 25 cmH2O   I:E Ratio 1:2.50   Sensitivity 2   High Peep/I Pressure 0   PEEP/CPAP (cmH2O) 5   I Time/ I Time % 0 s   Mean Airway Pressure 11 cmH20   Plateau Pressure 21 OAU19   Cough/Sputum   Sputum How Obtained Tracheal;Suctioned   Cough Non-productive   Sputum Amount None   Breath Sounds   Right Upper Lobe Diminished   Right Middle Lobe Diminished   Right Lower Lobe Diminished   Left Upper Lobe Diminished   Left Lower Lobe Diminished   Additional Respiratory Assessments   Pulse 84   Resp 16   SpO2 98 %   Position Semi-Mejía's   Vent Alarm Settings   High Pressure  50 cmH2O   Low Minute Volume Alarm 3 L/min   Low Exhaled Vt  200 mL   RR High 40 br/min   Apnea (Secs) 20 secs   Ambu Bag With Mask At Bedside Yes   Patient Observation   Observations #6 xlt prox. ambu bag at head of bed.  water good.     Surgical Airway (Trach) 05/16/22 N[place]om[place]y Cuffed   Placement Date/Time: 05/16/22 0946   Present on Admission/Arrival: No  Placed By: In surgery  Surgical Airway Type: Tracheostomy  Brand: Dao  Style: (c) Cuffed  Size (mm): 6   Status Secured

## 2022-05-18 NOTE — PROGRESS NOTES
Argatroban Infusion  Ptt @ 1030= 101 secs  Will continue to hold until Ptt is below 90 secs  Next Ptt at [address] 5/18/202211:18 AM  .

## 2022-05-18 NOTE — PROGRESS NOTES
Patient continues to refuse to be turned, this RN requests patient be turned for skin integrity, patient states he understands but that he does not want to be turned.

## 2022-05-18 NOTE — PROGRESS NOTES
Discussed CRRT with Dr. Kristin Lemon, if patient is able to get new Vascath today, restart CRRT at previously ordered settings. If unable to get new vascath, withdraw cathflo from vascath and start CRRT on current vascath.

## 2022-05-18 NOTE — PROGRESS NOTES
Reassessment complete. Patient continues awake and on SBT. Physical assessment unchanged. Patient refused repositioning at this time. CRRT started through tunneled vascath, cathflo withdrawn and line giving great blood return. CRRT running at previous orders. Flow parameters are: , DIALYSATE 500, POST 500. Fluid removal goal is KEEP EVEN per nephrology. Patient tolerating well at this time. LEVOPHED infusing at a rate of 6 mcg/min.    VASOPRESSIN infusing at 0.03 units/min

## 2022-05-18 NOTE — PROGRESS NOTES
Patient with frequent access pressure alarms, access and return lines flush and give blood without difficulty. Line reversed with improved access numbers.

## 2022-05-18 NOTE — PROGRESS NOTES
05/18/22 1834   NICU Vent Information   Vent Type 980   Vent Mode AC/VC   Vt (Set, mL) 490 mL   Vt Exhaled 519 mL   Resp Rate (Set) 16 bmp   Rate Measured 16 br/min   Minute Volume 8.42 Liters   Peak Flow 50 L/min   Pressure Support 0 cmH20   FiO2  35 %   Peak Inspiratory Pressure 24 cmH2O   I:E Ratio 1:2.50   Sensitivity 2   High Peep/I Pressure 0   PEEP/CPAP (cmH2O) 5   I Time/ I Time % 0 s   Mean Airway Pressure 12 cmH20   Plateau Pressure 28 BXQ18   Static Compliance 23 mL/cmH2O   Dynamic Compliance 27 mL/cmH2O   Cough/Sputum   Sputum How Obtained Tracheal;Suctioned   Cough Non-productive   Sputum Amount None   Breath Sounds   Right Upper Lobe Diminished   Right Middle Lobe Diminished   Right Lower Lobe Diminished   Left Upper Lobe Diminished   Left Lower Lobe Diminished   Additional Respiratory Assessments   Pulse 94   Resp 17   SpO2 97 %   Position Semi-Mejía's   Humidification Source Heated wire   Humidification Temp 36.8   Circuit Condensation Drained   Vent Alarm Settings   High Pressure  50 cmH2O   Low Minute Volume Alarm 3 L/min   Low Exhaled Vt  200 mL   RR High 40 br/min   Apnea (Secs) 20 secs   Ambu Bag With Mask At Bedside Yes   Patient Observation   Observations #6 XLT PROX. AMBU BAG AT HEAD OF BED. WATER GOOD.     Surgical Airway (Trach) 05/16/22 Shiley Cuffed   Placement Date/Time: 05/16/22 0946   Present on Admission/Arrival: No  Placed By: In surgery  Surgical Airway Type: Tracheostomy  Brand: Dao  Style: (c) Cuffed  Size (mm): 6   Status Secured

## 2022-05-18 NOTE — PROGRESS NOTES
Argatroban Infusion  Ptt @ 1800= 94.4secs  Will continue to hold until Ptt is below 90 secs  Next Ptt at 2200

## 2022-05-18 NOTE — PROGRESS NOTES
Discussed PT/INR and PTT with pharmacist and intensivist, new order for Vitamin K, rechecking PTT for restarting Argatroban, and will reassess tomorrow for appropriateness of vascath exchange.

## 2022-05-18 NOTE — PROGRESS NOTES
Care rounds complete with care team. Discussed patient's increased pain and PRN Fentanyl causing drops in blood pressure - PO pain medication increased and Fentanyl d/c'd. New MAP goal per intensivist for MAP 60 to petr to decrease pressor requirements.

## 2022-05-18 NOTE — CARE COORDINATION
INTERDISCIPLINARY PLAN OF CARE CONFERENCE    Date/Time: 5/18/2022 9:10 AM  Completed by: Maria Tobisa Case Management      Patient Name:  Crystal Jean-Baptiste  YOB: 1965  Admitting Diagnosis: Hyperkalemia [E87.5]  Hypoglycemia [E16.2]  ESRD on dialysis ([facility] [N18.6, Z99.2]  Acute cystitis with hematuria [N30.01]  Septic shock ([facility] [A41.9, R65.21]  Sepsis ([facility] [A41.9]  Pneumonia due to infectious organism, unspecified laterality, unspecified part of lung [J18.9]     Admit Date/Time:  4/21/2022  1:21 PM    Chart reviewed. Interdisciplinary team contacted or reviewed plan related to patient progress and discharge plans. Disciplines included Case Management, Nursing, and Dietitian. Current Status:ongoing   PT/OT recommendation for discharge plan of care: n/a    Expected D/C Disposition:  [address] [facility]) vs TBD    Discharge Plan Comments: Chart review completed. Completed Interdisciplinary rounds with ICU staff. Writer left a message for pt's wife yesterday and no return call received. Pt's wife or son not present at bedside. Spoke with RN who states to wait to follow up on LTACH at this time. CM will continue to follow and assist. Please notify CM if needs or concerns arise.     Home O2 in place on admit: Yes

## 2022-05-18 NOTE — PROGRESS NOTES
Lab called to say their machine is broken and they are substantially delayed in reading labs as most are being sent to another facility. Also called to say that the lab drawn at [place] [address] was ruined and needed to be redrawn. Redrawn at [phone]. Panic called to RN @ 0446 - level of 136. Drip has been off since 0205. Recheck of aPTT set for 5/18 @ 0600. Per RN, patient seems to be doing well with no apparent adverse affects from high aPTT. Pharmacy will continue to monitor.      Akua Torres, CamD, [facility], 5/18/2022 4:53 AM
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
## 2022-05-18 NOTE — PROGRESS NOTES
Argatroban Infusion  Ptt @ 1430= 100.2 secs  Will continue to hold until Ptt is below 90 secs  Next Ptt at [address] 5/18/20223:50 PM  .      .

## 2022-05-18 NOTE — PROGRESS NOTES
Argatroban drip continues to be off (since 5/18 @ 0205). APTT @ 0548 = 110 sec. Next aPTT 5/18 @ 0800. Will restart drip once aPTT falls below 90.1 sec.      Zi Fuentes PharmD, [facility], 5/18/2022 7:30 AM

## 2022-05-18 NOTE — PROGRESS NOTES
RT Inhaler-Nebulizer Bronchodilator Protocol Note    There is a bronchodilator order in the chart from a provider indicating to follow the RT Bronchodilator Protocol and there is an Initiate RT Inhaler-Nebulizer Bronchodilator Protocol order as well (see protocol at bottom of note). CXR Findings:  XR CHEST PORTABLE    Result Date: 5/17/2022  Bilateral pleuroparenchymal disease, decreased on the left, but increased on the right     XR CHEST PORTABLE    Result Date: 5/16/2022  Endotracheal tube remains approximately 3 cm above the quintin. Poor inspiration with basilar airspace disease and possible effusion primarily on the left. The findings from the last RT Protocol Assessment were as follows:   History Pulmonary Disease: Chronic pulmonary disease  Respiratory Pattern: Regular pattern and RR 12-20 bpm  Breath Sounds: Slightly diminished and/or crackles  Cough: Weak, productive  Indication for Bronchodilator Therapy: On home bronchodilators  Bronchodilator Assessment Score: 6    Aerosolized bronchodilator medication orders have been revised according to the RT Inhaler-Nebulizer Bronchodilator Protocol below. Respiratory Therapist to perform RT Therapy Protocol Assessment initially then follow the protocol. Repeat RT Therapy Protocol Assessment PRN for score 0-3 or on second treatment, BID, and PRN for scores above 3. No Indications - adjust the frequency to every 6 hours PRN wheezing or bronchospasm, if no treatments needed after 48 hours then discontinue using Per Protocol order mode. If indication present, adjust the RT bronchodilator orders based on the Bronchodilator Assessment Score as indicated below.   Use Inhaler orders unless patient has one or more of the following: on home nebulizer, not able to hold breath for 10 seconds, is not alert and oriented, cannot activate and use MDI correctly, or respiratory rate 25 breaths per minute or more, then use the equivalent nebulizer order(s) with same Frequency and PRN reasons based on the score. If a patient is on this medication at home then do not decrease Frequency below that used at home. 0-3 - enter or revise RT bronchodilator order(s) to equivalent RT Bronchodilator order with Frequency of every 4 hours PRN for wheezing or increased work of breathing using Per Protocol order mode. 4-6 - enter or revise RT Bronchodilator order(s) to two equivalent RT bronchodilator orders with one order with BID Frequency and one order with Frequency of every 4 hours PRN wheezing or increased work of breathing using Per Protocol order mode. 7-10 - enter or revise RT Bronchodilator order(s) to two equivalent RT bronchodilator orders with one order with TID Frequency and one order with Frequency of every 4 hours PRN wheezing or increased work of breathing using Per Protocol order mode. 11-13 - enter or revise RT Bronchodilator order(s) to one equivalent RT bronchodilator order with QID Frequency and an Albuterol order with Frequency of every 4 hours PRN wheezing or increased work of breathing using Per Protocol order mode. Greater than 13 - enter or revise RT Bronchodilator order(s) to one equivalent RT bronchodilator order with every 4 hours Frequency and an Albuterol order with Frequency of every 2 hours PRN wheezing or increased work of breathing using Per Protocol order mode. Patient will benefit from treatments.      Electronically signed by Donnald Kussmaul, RCP on 5/17/2022 at 8:51 PM

## 2022-05-18 NOTE — PROGRESS NOTES
EOS report given to Leandro Mcardle, RN. -Levophed as high as 6 mcg/min overnight and as low as 4. Currently infusing at 6 mcg/min d/t PRN fentanyl being administered after bath and wound care.    -Pain seems to be managed much better. PRN percocet administered at 0200 after repositioning. Pt had requested pain medicine prior (around 2200) but by the time this RN went back into the room, pt was sleeping calmly so PRN was not administered. Explained to pt the importance of limiting pain medication when pain is able to be tolerated without, d/t the effects it has on his BP. Pt agreeable and OK with missing pain med dose if he has fallen asleep after requesting it. -CHG bath and wound care completed. D/t worsening wounds despite daily wound care, pt on a very strict q2h turning schedule even though pt previously had been refusing repositioning. Worsening skin condition and importance of repositioning explained to pt. Pt agreeable and OK with repositioning q2h.    -aPTT resulted as 106.0 around 2200. Argatroban gtt turned off. Recheck on aPTT delayed d/t malfunctioning lab equipment. Next aPTT that was able to be ran was 136.0 around 0400 and 110 around 0600. Per pharmacies protocol, will check aPTT q2h until < 90.1. No other changes noted overnight. Pt stable at this time, care transferred.

## 2022-05-18 NOTE — PROGRESS NOTES
05/18/22 0243   NICU Vent Information   Vent Type 980   Vent Mode AC/VC   Vt (Set, mL) 490 mL   Vt Exhaled 506 mL   Resp Rate (Set) 16 bmp   Rate Measured 16 br/min   Minute Volume 8.11 Liters   Peak Flow 50 L/min   Pressure Support 0 cmH20   FiO2  35 %   Peak Inspiratory Pressure 25 cmH2O   I:E Ratio 1:2.50   Sensitivity 2   High Peep/I Pressure 0   PEEP/CPAP (cmH2O) 5   I Time/ I Time % 0 s   Mean Airway Pressure 11 cmH20   Plateau Pressure 21 [id number]   Cough/Sputum   Sputum How Obtained Tracheal;Suctioned   Cough Non-productive   Sputum Amount None   Breath Sounds   Right Upper Lobe Diminished   Right Middle Lobe Diminished   Right Lower Lobe Diminished   Left Upper Lobe Diminished   Left Lower Lobe Diminished   Additional Respiratory Assessments   Pulse 84   Resp 16   SpO2 98 %   Position Semi-Mejía's   Humidification Source Heated wire   Humidification Temp 36.9   Vent Alarm Settings   High Pressure  50 cmH2O   Low Minute Volume Alarm 3 L/min   Low Exhaled Vt  200 mL   RR High 40 br/min   Apnea (Secs) 20 secs   Ambu Bag With Mask At Bedside Yes   Patient Observation   Observations #6 xlt prox. ambu bag at head of bed.  water good.     Surgical Airway (Trach) 05/16/22 [place] Cuffed   Placement Date/Time: 05/16/22 0946   Present on Admission/Arrival: No  Placed By: In surgery  Surgical Airway Type: Tracheostomy  Brand: Dao  Style: (c) Cuffed  Size (mm): 6   Status Secured

## 2022-05-18 NOTE — PROGRESS NOTES
05/17/22 2253   NICU Vent Information   Vent Type 980   Vent Mode AC/VC   Vt (Set, mL) 490 mL   Vt Exhaled 506 mL   Resp Rate (Set) 16 bmp   Rate Measured 16 br/min   Minute Volume 8.1 Liters   Peak Flow 50 L/min   Pressure Support 0 cmH20   FiO2  35 %   Peak Inspiratory Pressure 31 cmH2O   I:E Ratio 1:2.50   Sensitivity 2   High Peep/I Pressure 0   PEEP/CPAP (cmH2O) 5   I Time/ I Time % 0 s   Mean Airway Pressure 12 cmH20   Plateau Pressure 23 [id number]   Cough/Sputum   Sputum How Obtained Tracheal;Suctioned   Cough Non-productive   Sputum Amount None   Breath Sounds   Right Upper Lobe Diminished   Right Middle Lobe Diminished   Right Lower Lobe Diminished   Left Upper Lobe Diminished   Left Lower Lobe Diminished   Additional Respiratory Assessments   Pulse 84   Resp 16   SpO2 98 %   Position Semi-Mejía's   Humidification Source Heated wire   Humidification Temp 36.9   Circuit Condensation Drained   Vent Alarm Settings   High Pressure  50 cmH2O   Low Minute Volume Alarm 3 L/min   Low Exhaled Vt  200 mL   RR High 40 br/min   Apnea (Secs) 20 secs   Patient Observation   Observations #6 XLT PROX. AMBU BAG AT HEAD OF BED. WATER GOOD.     Surgical Airway (Trach) 05/16/22 [place] Cuffed   Placement Date/Time: 05/16/22 0946   Present on Admission/Arrival: No  Placed By: In surgery  Surgical Airway Type: Tracheostomy  Brand: Dao  Style: (c) Cuffed  Size (mm): 6   Status Secured

## 2022-05-18 NOTE — PROGRESS NOTES
Hospitalist Progress Note      PCP: Clay Chowdary MD    Date of Admission: 4/21/2022    Subjective:     49 y.o. male with hx of ischemic CM, paraplegia, IDDM, ESRD on HD presents with bacteremia and decubitus ulcers, septic shock     Improving sepsis from last week, off sammi, vaso, dobutamine, now remains on levophed,      Ongoing CRRT with fluid removal   No fevers    5/11  Patient was on Airvo and BiPAP-> worsening hypoxemia-> intubated. Continued on Levophed   Continued on CRRT     5/14  Continues to require pressors  On CRRT   Remains on mechanical vent. FiO2 45%. Awake and alert. Plan is for tracheostomy and PEG tube placement on Monday      5/15  Worsening hypoxemia today with FiO2 up to 60% and PEEP up to 8, s/p bronchoscopy with aspiration of mucus plugs . Currently FiO2 is at 50% PEEP is at 8 .    5/16-plans for tracheostomy and PEG tube today. Awake and alert on the vent. Air mattress is apparently not working. Pressure injury to the back. 5/17- had tracheostomy and PEG placed on 5/17/2022. Had a DTI of the back. Still on pressors and has been difficult to wean off. On CRRT, Heparin was stopped for procedure. 5/18-still on Levophed and vasopressin. Blood pressure dropped slightly after he received fentanyl. Undergoing CRRT. Heparin stopped. Started on argatroban.       Medications:  Reviewed    Infusion Medications    norepinephrine 5 mcg/min (05/18/22 1232)    argatroban Stopped (05/18/22 0205)    vasopressin (Septic Shock) infusion 0.03 Units/min (05/18/22 1209)    dextrose      prismaSATE BGK 4/2.5 500 mL/hr at 05/16/22 2059    prismaSATE BGK 4/2.5 500 mL/hr at 05/16/22 2059    prismaSATE BGK 4/2.5 500 mL/hr at 05/16/22 2059    sodium chloride Stopped (05/18/22 1105)     Scheduled Medications    ipratropium-albuterol  1 ampule Inhalation Q4H    midodrine  15 mg Oral TID WC    sodium chloride (Inhalant)  4 mL Nebulization Q12H    insulin glargine  15 Units SubCUTAneous Nightly    sodium chloride  500 mL IntraVENous Once    colistimethate (COLY-MYCIN) nebulization  150 mg Nebulization Q8H    collagenase   Topical Daily    sodium chloride  1,000 mL IntraVENous Once    tigecycline (TYGACIL) IVPB  50 mg IntraVENous Q12H    epoetin bebo-epbx  3,000 Units SubCUTAneous Once per day on Mon Wed Fri    sennosides-docusate sodium  2 tablet Oral BID    insulin lispro  0-18 Units SubCUTAneous Q4H    pantoprazole  40 mg IntraVENous Daily    menthol-zinc oxide   Topical Daily    aspirin  81 mg Oral Nightly    sodium chloride flush  5-40 mL IntraVENous 2 times per day     PRN Meds: oxyCODONE-acetaminophen, fentanNYL, sodium phosphate IVPB **OR** sodium phosphate IVPB **OR** sodium phosphate IVPB **OR** sodium phosphate IVPB, traZODone, ipratropium-albuterol, dextrose bolus **OR** dextrose bolus, glucagon (rDNA), dextrose, potassium chloride, magnesium sulfate, calcium gluconate **OR** calcium gluconate **OR** calcium gluconate **OR** calcium gluconate, glucose, midazolam, sodium chloride flush, sodium chloride, ondansetron **OR** ondansetron, polyethylene glycol, acetaminophen **OR** acetaminophen      Intake/Output Summary (Last 24 hours) at 5/18/2022 1238  Last data filed at 5/18/2022 1142  Gross per 24 hour   Intake 2581. 56 ml   Output 93 ml   Net 2488. 56 ml       Physical Exam Performed:    BP 87/72   Pulse 99   Temp 98.9 °F (37.2 °C) (Bladder)   Resp 22   Ht 6' 2\" (1.88 m)   Wt 234 lb 8 oz (106.4 kg)   SpO2 93%   BMI 30.11 kg/m²       General appearance: intubated, on mechanical ventilation. Responding. Chronically ill-appearing  O2 sats stable at FiO2 45%  Ongoing CRRT  HEENT: NAD, pupils reactive to light  Neck: Supple,  No jugular venous distention. Tracheostomy present.    Respiratory: Diminished breath sounds bilaterally,  Clear, no rhonchi today  Cardiovascular: S 1 S2 heard,  Right chest HD catheter and Port noted  Abdomen: Soft, non-tender, +distended with normal bowel sounds. Colostomy noted in left LQ/ PEG present  Musculoskeletal: Right BKA with stump healthy. Superficial skin ulcers on right thigh with no active infection noted  Left LE edema with superficial ulceration noted   Skin: see Epic wound pictures, chronic exposed hardware in lower thoracic and lumbar region             Neurologic: Awake, intubated  paraplegic , atrophy of forearm and hand muscles   Fully awake  alert oriented    Labs:   Recent Labs     05/16/22  0400 05/17/22  0415 05/18/22  0355   WBC 10.8 12.1* 12.0*   HGB 9.0* 9.1* 8.5*   HCT 29.0* 29.2* 26.7*   PLT 72* 86* 116*     Recent Labs     05/17/22  0100 05/17/22  0800 05/18/22  0355   * 129* 129*   K 3.9 4.1 4.3   CL 97* 95* 96*   CO2 24 24 23   BUN 17 18 30*   CREATININE 0.7* 0.7* 1.2   CALCIUM 8.1* 8.3 8.0*   PHOS 2.6 2.5 3.2     Recent Labs     05/18/22  0355   AST 19   ALT 11   BILIDIR 0.7*   BILITOT 0.9   ALKPHOS 214*     Recent Labs     05/16/22  0742 05/18/22  0830   INR 1.48* 3.36*     No results for input(s): CKTOTAL, TROPONINI in the last 72 hours. Urinalysis:      Lab Results   Component Value Date    NITRU Negative 04/21/2022    WBCUA 21-50 04/21/2022    BACTERIA 4+ 04/21/2022    RBCUA  04/21/2022    BLOODU LARGE 04/21/2022    SPECGRAV >=1.030 04/21/2022    GLUCOSEU Negative 04/21/2022    GLUCOSEU >=1000 mg/dL 08/31/2010       Radiology:  XR CHEST PORTABLE   Final Result   Stable appearance of the chest with bilateral pleural effusions and   associated airspace change in the lower lung zones. XR CHEST PORTABLE   Final Result   Bilateral pleuroparenchymal disease, decreased on the left, but increased on   the right         US GUIDED PARACENTESIS   Final Result   Successful paracentesis. XR CHEST PORTABLE   Final Result   Endotracheal tube remains approximately 3 cm above the quintin. Poor inspiration with basilar airspace disease and possible effusion   primarily on the left.          XR CHEST PORTABLE   Final Result   Persisting left lower lobe airspace disease with volume loss or collapse. Mucous plugging considered. Improving aeration right lower lobe. XR CHEST PORTABLE   Final Result   Persistent pleural-parenchymal disease at the lung bases, similar to prior. XR CHEST PORTABLE   Final Result   Improving left lower lobe consolidation         XR CHEST PORTABLE   Final Result   1. Unchanged position of support devices. 2. Vascular congestion, basilar opacities and small pleural effusions are   again demonstrated and without significant change. CT ABDOMEN PELVIS W IV CONTRAST Additional Contrast? None   Final Result   Increasing opacifications of the lung bases with small to moderate pleural   effusions right greater than left and adjacent opacifications which appear   greatest in the left lower lobe of worsening airspace disease and/or   atelectasis from prior comparison 04/21/2022      Similar appearance of small to moderate volume abdominopelvic ascites without   loculated or heterogeneous fluid collection otherwise evident greatest in the   perihepatic and perisplenic regions         XR CHEST PORTABLE   Final Result   1. Interval placement of endotracheal and orogastric tubes, in proper   positions. 2. Other support apparatus is stable and unchanged. 3. Stable small bilateral pleural effusions. 4. Progressive multifocal airspace opacities within the bilateral mid and   lower lung zones, likely a combination of progressive pulmonary edema and   multifocal pneumonia. XR CHEST PORTABLE   Final Result   Hypoinflated lungs with residual basilar opacities likely representing   atelectasis. The technique and hypoinflation will exaggerate the pulmonary vasculature. IR MIDLINE CATH   Final Result      XR CHEST PORTABLE   Final Result   Stable supportive devices. Increasing vascular congestion/mild pulmonary   edema.          XR CHEST PORTABLE   Final Result   Low lung volumes with bibasilar atelectasis. Otherwise no acute process. XR CHEST PORTABLE   Final Result   Supportive tubing is in normal position. Stable left basilar opacity, atelectasis versus airspace disease. XR CHEST PORTABLE   Final Result   Slight improved aeration of the right lung. Bilateral pleuroparenchymal   disease remains         XR CHEST PORTABLE   Final Result   No significant change compared to chest x-ray from 05/01/2022. XR CHEST PORTABLE   Final Result   Low lung volumes with patchy airspace disease which may represent multifocal   atelectasis. Overall stable appearing chest.  Possible trace effusions. XR CHEST PORTABLE   Final Result   Relatively stable appearance of the chest with bibasilar airspace opacities. XR CHEST PORTABLE   Final Result   Low volume study with bilateral atelectasis or airspace disease, similar to   prior, with persistent small pleural effusions. XR CHEST PORTABLE   Final Result   Stable chest.         XR CHEST PORTABLE   Final Result   Endotracheal tube tip projects at the mid intrathoracic trachea. Otherwise   stable chest.         XR CHEST PORTABLE   Final Result   Suboptimal examination due to patient rotation. The chest appears stable. XR CHEST PORTABLE   Final Result   Endotracheal tube tip projects at the thoracic inlet. Otherwise stable chest.         XR CHEST PORTABLE   Final Result   Stable chest.         XR CHEST PORTABLE   Final Result   Stable endotracheal tube located approximately 3.3 cm above the quintin. Low lung volumes. Suspected small right pleural effusion. XR CHEST PORTABLE   Final Result   Endotracheal tube in satisfactory position above the quintin      Bibasilar hypoaeration persist         CT ABDOMEN PELVIS W IV CONTRAST Additional Contrast? None   Final Result   1.  Moderate amount of ascites with 3rd spacing including edematous changes throughout the abdomen and anasarca. 2. Delgado in place. Diffuse bladder wall thickening. Correlate for   underlying cystitis. 3. No acute bowel pathology. Mild gastric distension. Diverticulosis with   no acute features. 4. Mild renal cortical scarring and asymmetric right renal atrophy, stable. No hydronephrosis. CT CHEST PULMONARY EMBOLISM W CONTRAST   Final Result   1. No evidence of acute pulmonary embolism. There is some thickening and   mild irregularity in the pulmonary arteries in the left lower lobe which may   represent chronic pulmonary embolism or secondary appearance to inflammation. No evidence of aortic aneurysm or dissection. 2. Moderate right effusion and right lower lobe atelectatic and/or   consolidative changes. Pneumonia is likely. There is severe loss of volume   in the right lung. Trace left effusion and left lower lobe atelectatic   changes are seen. 3. Moderate ascites around the spleen and liver. XR CHEST PORTABLE   Final Result   Low lung volumes. Bilateral pleural effusions with bibasilar volume loss,   right greater than left. No overt failure. IR REMOVE TUNNELED CVAD WO SQ PORT/PUMP    (Results Pending)   IR NONTUNNELED VASCULAR CATHETER > 5 YEARS    (Results Pending)   US DUP LOWER EXTREMITIES BILATERAL VENOUS    (Results Pending)     Cultures  Blood - 4/21 - Group A , strep pyogenes and Enterococcus Faecalis   Repeat blood - NG 4/23  Urine - Ecoli , Enterococcus Faecalis   Sputum- Acinetobacter baumannii        Assessment/Plan:    Septic shock. Enterococcus faecalis and strep pyogenes bacteremia. Right lower extremity cellulitis  Chronic pressure ulcers on the back with exposed hardware   Source could be HD line vs exposed hardware  Patient was on vancomycin, Merrem and clindamycin.  changed to  fortaz , tygacil and setffany nebs for acinetobacter. Completed 14 days of IV Vanco. ID managing this.   Received 1 dose of Tygacil 5/11  Severe hypotensive shock  needing sammi, vaso, dobutamine and levophed    on hydrocortisone for shock-->weaned off   Critical care managing  Improving hypotension, improved wbc  -Currently on Levophed and vasopressin. Has been difficult to wean off.   -Try to wean as tolerated    End-stage renal disease on hemodialysis. Nephrology consulted  Ongoing CRRT-fluid removal as tolerated  -Hyponatremia. Hypertonic saline ordered by intensivist.     Acute hypoxic resp failure  On mechanical ventilation. 4/22-5/6; reintubated on 5/11/2022  Healthcare associated pneumonia. Respiratory culture shows Acinetobacter vomiting.  -Possible thoracentesis in a.m.  -Intubated in the ICU on 4/22-> extubated 5/6. On Airvo and BiPAP  -Antibiotics as above. -S/p bronch 5/1/22 given worsening leucocytosis/fever  -cx with acinetobacter IV ceftaz,tygacil  and steffany nebs  -> Reintubated on 5/11/22 for worsening hypoxic hypercapnic respiratory failure  -> S/P bronch 5/15   PEG and tracheostomy placed on 5/16/2022.       Chronic systolic congestive heart failure, EF 25 to 30%. Ischemic CM/CAD s/p previous stents   Cardiology consulted  Hold home medications given hypotension  Off dobutamine   Resume ASA , statins     Acute metabolic encephalopathy resolved. Secondary to septic shock  resolved     Type 2 diabetes. Lantus insulin and sliding scale insulin     History of DVT  -Stop heparin. Start argatroban. HIT pending. Chronic wounds to low back, thoracic spine, ischium  DTI noted. -[address] [place] [address],[address] by Dr. Axel Bass    Paraplegia  Neurogenic bladder    bed bound ,supportive care  - intermittent self catherizations , now has langley     -HIT pending. Started on argatroban. Paracentesis done and 3 L of fluid was removed. No subacute bacterial peritonitis.       Diet: Diet NPO  ADULT TUBE FEEDING; PEG; Renal Formula; Continuous; 20; Yes; 15; Q 4 hours; 35; 30; Q 4 hours; Protein; one proteinex P2Go TWICE daily via feeding tube  Code Status: Full Code  DVT prophylaxis-Heparin drip  Dispo - cont care  updated family     Cyndi Habermann, MD 5/18/2022 12:38 PM

## 2022-05-18 NOTE — PROGRESS NOTES
APTT reading from 2155 on 5/17 missed by pharmacy. Reading was 106. RN told to turn off infusion immediately when pharmacy noticed (5/18 @ 0205). RN let pharmacy know that he had sent an FYI of the aPTT in a med message when asking for another med (so he did inform pharmacy), however, pharmacist missed it. Drip turned off as soon as pharmacy noticed the high reading (5/18 @ 0205) and another aPTT was drawn. Drip will remain off and aPTT's will be drawn every 2 hours until it falls below 90.1 (per protocol). Nocturnist notified of situation @ [phone]. Pharmacy will continue to monitor.      Anjelica Decker, PharmD, [facility], 5/18/2022 2:29 AM

## 2022-05-18 NOTE — PROGRESS NOTES
RT Nebulizer Bronchodilator Protocol Note    There is a bronchodilator order in the chart from a provider indicating to follow the RT Bronchodilator Protocol and there is an Initiate RT Bronchodilator Protocol order as well (see protocol at bottom of note). CXR Findings:  XR CHEST PORTABLE    Result Date: 5/17/2022  Bilateral pleuroparenchymal disease, decreased on the left, but increased on the right       The findings from the last RT Protocol Assessment were as follows:  Smoking: Chronic pulmonary disease  Respiratory Pattern: Regular pattern and RR 12-20 bpm  Breath Sounds: Slightly diminished and/or crackles  Cough: Weak, productive  Indication for Bronchodilator Therapy: On home bronchodilators  Bronchodilator Assessment Score: 6    Aerosolized bronchodilator medication orders have been revised according to the RT Nebulizer Bronchodilator Protocol below. Respiratory Therapist to perform RT Therapy Protocol Assessment initially then follow the protocol. Repeat RT Therapy Protocol Assessment PRN for score 0-3 or on second treatment, BID, and PRN for scores above 3. No Indications - adjust the frequency to every 6 hours PRN wheezing or bronchospasm, if no treatments needed after 48 hours then discontinue using Per Protocol order mode. If indication present, adjust the RT bronchodilator orders based on the Bronchodilator Assessment Score as indicated below. If a patient is on this medication at home then do not decrease Frequency below that used at home. 0-3 - enter or revise RT bronchodilator order(s) to equivalent RT Bronchodilator order with Frequency of every 4 hours PRN for wheezing or increased work of breathing using Per Protocol order mode.        4-6 - enter or revise RT Bronchodilator order(s) to two equivalent RT bronchodilator orders with one order with BID Frequency and one order with Frequency of every 4 hours PRN wheezing or increased work of breathing using Per Protocol order mode.         7-10 - enter or revise RT Bronchodilator order(s) to two equivalent RT bronchodilator orders with one order with TID Frequency and one order with Frequency of every 4 hours PRN wheezing or increased work of breathing using Per Protocol order mode. 11-13 - enter or revise RT Bronchodilator order(s) to one equivalent RT bronchodilator order with QID Frequency and an Albuterol order with Frequency of every 4 hours PRN wheezing or increased work of breathing using Per Protocol order mode. Greater than 13 - enter or revise RT Bronchodilator order(s) to one equivalent RT bronchodilator order with every 4 hours Frequency and an Albuterol order with Frequency of every 2 hours PRN wheezing or increased work of breathing using Per Protocol order mode. RT to enter RT Home Evaluation for COPD & MDI Assessment order using Per Protocol order mode.     Electronically signed by Abbie Felipe RCP on 5/18/2022 at 7:27 AM
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
## 2022-05-18 NOTE — PROGRESS NOTES
Shift assessment completed, see flow sheet. Medications administered per MAR. SpO2 98% on AC/VC 35% and +5. Bilateral LS diminished with minimal secretions noted. RASS -1, follows commands in BUE and nods/gestures approriately. Frequent PRN pain medicine causing some lethargy. Levophed infusing at 6 mcg/min. Vasopressin infusing at 0.03 units/min. Argatroban infusing at 0.5 mcg/kg/min    Midline and port in place and functioning appropriately, dressings C/D/I.    PEG in place with TF infusing, increased to goal rate and pt tolerating well. Delgado catheter draining cloudy, tea colored urine. Colostomy in place draining loose brown stool. .     Pt's mother, Kush Barnes, called unit and updated on pt status and POC. All lines and monitoring devices remain in place. Call light within reach. Bed locked and in lowest position with alarm on. Will continue to monitor.

## 2022-05-18 NOTE — PROGRESS NOTES
Discussed Vascath placement with Dr. Renteria as INR 3.36, IR says INR would have to be < 2 and patient would need at least 4 units of platelets and then reassess INR to even potentially replace vascath today. Dr. Renteria decided to let INR come down naturally as patient has been off Argatuban and reassess for appropriateness of Vascath replacement tomorrow.

## 2022-05-19 NOTE — PROGRESS NOTES
RT Inhaler-Nebulizer Bronchodilator Protocol Note    There is a bronchodilator order in the chart from a provider indicating to follow the RT Bronchodilator Protocol and there is an Initiate RT Inhaler-Nebulizer Bronchodilator Protocol order as well (see protocol at bottom of note). CXR Findings:  XR CHEST PORTABLE    Result Date: 5/18/2022  Stable appearance of the chest with bilateral pleural effusions and associated airspace change in the lower lung zones. The findings from the last RT Protocol Assessment were as follows:   History Pulmonary Disease: Chronic pulmonary disease  Respiratory Pattern: Regular pattern and RR 12-20 bpm  Breath Sounds: Slightly diminished and/or crackles  Cough: Weak, non-productive  Indication for Bronchodilator Therapy: On home bronchodilators  Bronchodilator Assessment Score: 7    Aerosolized bronchodilator medication orders have been revised according to the RT Inhaler-Nebulizer Bronchodilator Protocol below. Respiratory Therapist to perform RT Therapy Protocol Assessment initially then follow the protocol. Repeat RT Therapy Protocol Assessment PRN for score 0-3 or on second treatment, BID, and PRN for scores above 3. No Indications - adjust the frequency to every 6 hours PRN wheezing or bronchospasm, if no treatments needed after 48 hours then discontinue using Per Protocol order mode. If indication present, adjust the RT bronchodilator orders based on the Bronchodilator Assessment Score as indicated below. Use Inhaler orders unless patient has one or more of the following: on home nebulizer, not able to hold breath for 10 seconds, is not alert and oriented, cannot activate and use MDI correctly, or respiratory rate 25 breaths per minute or more, then use the equivalent nebulizer order(s) with same Frequency and PRN reasons based on the score. If a patient is on this medication at home then do not decrease Frequency below that used at home.     0-3 - enter or revise RT bronchodilator order(s) to equivalent RT Bronchodilator order with Frequency of every 4 hours PRN for wheezing or increased work of breathing using Per Protocol order mode. 4-6 - enter or revise RT Bronchodilator order(s) to two equivalent RT bronchodilator orders with one order with BID Frequency and one order with Frequency of every 4 hours PRN wheezing or increased work of breathing using Per Protocol order mode. 7-10 - enter or revise RT Bronchodilator order(s) to two equivalent RT bronchodilator orders with one order with TID Frequency and one order with Frequency of every 4 hours PRN wheezing or increased work of breathing using Per Protocol order mode. 11-13 - enter or revise RT Bronchodilator order(s) to one equivalent RT bronchodilator order with QID Frequency and an Albuterol order with Frequency of every 4 hours PRN wheezing or increased work of breathing using Per Protocol order mode. Greater than 13 - enter or revise RT Bronchodilator order(s) to one equivalent RT bronchodilator order with every 4 hours Frequency and an Albuterol order with Frequency of every 2 hours PRN wheezing or increased work of breathing using Per Protocol order mode.          Electronically signed by Radha Crane RCP on 5/19/2022 at 7:21 PM
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
## 2022-05-19 NOTE — CARE COORDINATION
INTERDISCIPLINARY PLAN OF CARE CONFERENCE    Date/Time: 5/19/2022 1:27 PM  Completed by: CONRAD Delgado  Case Management    Patient Name:  Annalisa العلي  YOB: 1965  Admitting Diagnosis: Hyperkalemia [E87.5]  Hypoglycemia [E16.2]  ESRD on dialysis ([facility] [address] [N18.6, Z99.2]  Acute cystitis with hematuria [N30.01]  Septic shock ([facility] [address] [A41.9, R65.21]  Sepsis ([facility] [address] [A41.9]  Pneumonia due to infectious organism, unspecified laterality, unspecified part of lung [J18.9]     Admit Date/Time:  4/21/2022  1:21 PM    Chart reviewed. Interdisciplinary team contacted or reviewed plan related to patient progress and discharge plans. Disciplines included Case Management, Nursing, and Dietitian. Current Status:Ongoing   PT/OT recommendation for discharge plan of care: n/a    Expected D/C Disposition:  LTACH vs TBD    Discharge Plan Comments: Chart review completed.  Message left for pt's wife Rachell to follow up on the [facility], [facility] list.     Home O2 in place on admit: Yes

## 2022-05-19 NOTE — PROGRESS NOTES
05/19/22 1856   NICU Vent Information   Vent Type 980   Vent Mode PS   Vt (Set, mL) 0 mL   Vt Exhaled 379 mL   Resp Rate (Set) 0 bmp   Rate Measured 19 br/min   Minute Volume 8.13 Liters   Peak Flow 0 L/min   Pressure Support 12 cmH20   FiO2  30 %   Peak Inspiratory Pressure 18 cmH2O   I:E Ratio 1:3.70   Sensitivity 2   High Peep/I Pressure 0   PEEP/CPAP (cmH2O) 5   I Time/ I Time % 0 s   Mean Airway Pressure 9.1 cmH20   Cough/Sputum   Sputum How Obtained Suctioned;Endotracheal   Sputum Amount None   Breath Sounds   Right Upper Lobe Diminished   Right Middle Lobe Diminished   Right Lower Lobe Diminished   Left Upper Lobe Diminished   Left Lower Lobe Diminished   Additional Respiratory Assessments   Pulse 101   Resp 18   SpO2 94 %   Position Semi-Mejía's   Vent Alarm Settings   High Pressure  50 cmH2O   Low Minute Volume Alarm 3 L/min   RR High 30 br/min   Patient Observation   Observations Dao #6 XLT Proximal   Surgical Airway (Trach) 05/16/22 Dao Cuffed   Placement Date/Time: 05/16/22 0946   Present on Admission/Arrival: No  Placed By: In surgery  Surgical Airway Type: Tracheostomy  Brand: Dao  Style: (c) Cuffed  Size (mm): 6   Status Secured   Site Assessment Dry;Clean   Spare Trach at Bedside Yes   Spare Trach Tube One Size Smaller at Bedside Yes   Ambu Bag With Mask at Bedside Yes
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
## 2022-05-19 NOTE — PROGRESS NOTES
Morning assessment complete. Patient seen awake in bed. Patient with trach in place, on ventilator. Larry Zeba is a # 6 Shiley XLT, C/D/I and secured. RT at bedside to switch to SBT. Patient with c/o pain 8/10, PRN medication provided. Patient with no s/s of distress noted at this time. Physical assessment as charted in flow sheets. Scheduled medications given per orders. LEVOPHED infusing at a rate of 5 mcg/min. VASOPRESSIN infusing at 0.03 units/min. ARGATROBAN off per pharmacist's note r/t PTT >100. Central line dressing is clean, dry and intact with no signs of drainage. All tubing is current and dated. IPA caps applied to all access hubs. Delgado intact and secure, no urine draining. Stoma pink and moist, ostomy pouch     PEG intact and secure, tube feed running at goal rate of 35 mL/hr with 30 q4h water flushes. Patient repositioned for comfort. Patient voices no further needs at this time.

## 2022-05-19 NOTE — PROGRESS NOTES
05/19/22 0248   NICU Vent Information   Vent Type 980   Vent Mode AC/VC   Vt (Set, mL) 490 mL   Vt Exhaled 506 mL   Resp Rate (Set) 16 bmp   Rate Measured 16 br/min   Minute Volume 8.11 Liters   Peak Flow 50 L/min   Pressure Support 0 cmH20   FiO2  35 %   Peak Inspiratory Pressure 25 cmH2O   I:E Ratio 1:2.50   Sensitivity 2   High Peep/I Pressure 0   PEEP/CPAP (cmH2O) 5   I Time/ I Time % 0 s   Mean Airway Pressure 12 cmH20   Plateau Pressure 21 [id number]   Cough/Sputum   Sputum How Obtained Tracheal;Suctioned   Cough Non-productive   Sputum Amount None   Breath Sounds   Right Upper Lobe Diminished   Right Middle Lobe Diminished   Right Lower Lobe Diminished   Left Upper Lobe Diminished   Left Lower Lobe Diminished   Additional Respiratory Assessments   Pulse 90   Resp 16   SpO2 96 %   Position Semi-Mejía's   Humidification Source Heated wire   Humidification Temp 36.4   Circuit Condensation Drained   Vent Alarm Settings   High Pressure  50 cmH2O   Low Minute Volume Alarm 3 L/min   Low Exhaled Vt  200 mL   RR High 40 br/min   Apnea (Secs) 20 secs   Ambu Bag With Mask At Bedside Yes   Patient Observation   Observations #6 XLT PROX. AMBU BAG AT HEAD OF BED. WATER GOOD.     Surgical Airway (Trach) 05/16/22 Shiley Cuffed   Placement Date/Time: 05/16/22 0946   Present on Admission/Arrival: No  Placed By: In surgery  Surgical Airway Type: Tracheostomy  Brand: Dao  Style: (c) Cuffed  Size (mm): 6   Status Secured

## 2022-05-19 NOTE — PROGRESS NOTES
Argatroban:  Ptt=90.7 secs  Will restart Argatroban at 3pm at 0.125mcg/kg/min (0.7ml/hr)  Recheck aPtt at 1800  Goal 60-90 secs  Paddy Bashir Pharm D 5/19/20222:40 PM  .

## 2022-05-19 NOTE — PROGRESS NOTES
Ana      [facility] Record Number:  [id number]  Primary Care Physician:  Malissa Banks MD  Date of Consultation: 5/19/2022    Chief Complaint: none    Subjective: Patient seen and examined. Awake on vent. Stable ventilator settings. Minimal drainage from stoma    PHYSICAL EXAM  BP (!) 78/52   Pulse 99   Temp 96.5 °F (35.8 °C) (Bladder)   Resp 16   Ht 6' 2\" (1.88 m)   Wt 227 lb 14.4 oz (103.4 kg)   SpO2 97%   BMI 29.26 kg/m²     GENERAL: No Acute Distress, on ventilator  EYES: EOMI, Anti-icteric  NOSE: No epistaxis, nasal mucosa within normal limits, no purulent drainage  EARS: Normal external canal appearance, EAC patent bilaterally  FACE: 1/6 House-Brackmann Scale, symmetric, sensation equal bilaterally  ORAL CAVITY: No masses or lesions palpated, uvula is midline  NECK: #6 proximal xlt in place with sutures and trach ties. No creipitance. Scant SS drainage from stoma  CHEST: breathing with vent  SKIN: No rashes, normal appearing skin, no evidence of skin lesions/tumors      ASSESSMENT/PLAN  Ezio Cardozo is a very pleasant 49 y.o. male POD # 3 s/p tracheostomy  -doing well  -continue routine trach care  -please contact service with any further questions or concerns    Rafa Riley, DO  Otolaryngology    Medical Decision Making:   The following items were considered in medical decision making:  Independent review of images  Review / order clinical lab tests  Review / order radiology tests  Decision to obtain old records

## 2022-05-19 NOTE — PROGRESS NOTES
5/19  aPTT = 93.1 sec at 2225. Still continuing to hold argatroban gtt. Recheck aPTT in 4 hours at 0200.   Waleska Henry, PharmHARPER  5/19/2022 12:26 AM

## 2022-05-19 NOTE — PROGRESS NOTES
This note also relates to the following rows which could not be included:  SpO2 - Cannot attach notes to unvalidated device data       05/18/22 2234   NICU Vent Information   Vent Type 980   Vent Mode AC/VC   Vt (Set, mL) 490 mL   Vt Exhaled 514 mL   Resp Rate (Set) 16 bmp   Rate Measured 16 br/min   Minute Volume 8.24 Liters   Peak Flow 50 L/min   Pressure Support 0 cmH20   FiO2  35 %   Peak Inspiratory Pressure 35 cmH2O   I:E Ratio 1:2.50   Sensitivity 2   High Peep/I Pressure 0   PEEP/CPAP (cmH2O) 5   I Time/ I Time % 0 s   Mean Airway Pressure 13 cmH20   Plateau Pressure 28 [id number]   Cough/Sputum   Sputum How Obtained Tracheal;Suctioned   Cough Productive   Sputum Amount Small   Sputum Color Creamy   Tenacity Thick   Breath Sounds   Right Upper Lobe Diminished   Right Middle Lobe Diminished   Right Lower Lobe Diminished   Left Upper Lobe Diminished   Left Lower Lobe Diminished   Additional Respiratory Assessments   Pulse 106   Resp 16   Position Semi-Mejía's   Humidification Source Heated wire   Humidification Temp 36.7   Circuit Condensation Drained   Vent Alarm Settings   High Pressure  50 cmH2O   Low Minute Volume Alarm 3 L/min   Low Exhaled Vt  200 mL   RR High 40 br/min   Apnea (Secs) 20 secs   Ambu Bag With Mask At Bedside Yes   Patient Observation   Observations #6 XLT PROX. AMBU BAG AT HEAD OF BED. WATER GOOD.     Surgical Airway (Trach) 05/16/22 Shiley Cuffed   Placement Date/Time: 05/16/22 0946   Present on Admission/Arrival: No  Placed By: In surgery  Surgical Airway Type: Tracheostomy  Brand: Dao  Style: (c) Cuffed  Size (mm): 6   Status Secured        05/18/22 2234   [facility] Vent Information   Vent Type 980   Vent Mode AC/VC   Vt (Set, mL) 490 mL   Vt Exhaled 514 mL   Resp Rate (Set) 16 bmp   Rate Measured 16 br/min   Minute Volume 8.24 Liters   Peak Flow 50 L/min   Pressure Support 0 cmH20   FiO2  35 %   Peak Inspiratory Pressure 35 cmH2O   I:E Ratio 1:2.50   Sensitivity 2   High Peep/I Pressure 0 PEEP/CPAP (cmH2O) 5   I Time/ I Time % 0 s   Mean Airway Pressure 13 cmH20   Plateau Pressure 28 [id number]   Cough/Sputum   Sputum How Obtained Tracheal;Suctioned   Cough Productive   Sputum Amount Small   Sputum Color Creamy   Tenacity Thick   Breath Sounds   Right Upper Lobe Diminished   Right Middle Lobe Diminished   Right Lower Lobe Diminished   Left Upper Lobe Diminished   Left Lower Lobe Diminished   Additional Respiratory Assessments   Pulse 106   Resp 16   Position Semi-Mejía's   Humidification Source Heated wire   Humidification Temp 36.7   Circuit Condensation Drained   Vent Alarm Settings   High Pressure  50 cmH2O   Low Minute Volume Alarm 3 L/min   Low Exhaled Vt  200 mL   RR High 40 br/min   Apnea (Secs) 20 secs   Ambu Bag With Mask At Bedside Yes   Patient Observation   Observations #6 XLT PROX. AMBU BAG AT HEAD OF BED. WATER GOOD.     Surgical Airway (Trach) 05/16/22 Dao Cuffed   Placement Date/Time: 05/16/22 0946   Present on Admission/Arrival: No  Placed By: In surgery  Surgical Airway Type: Tracheostomy  Brand: Dao  Style: (c) Cuffed  Size (mm): 6   Status Secured

## 2022-05-19 NOTE — PROGRESS NOTES
5/19  aPTT = 100.5 sec at 0715.  hold argatroban gtt. Recheck aPTT in 4 hours at 1115  Will wait to restart again until aptt is <90 sec.

## 2022-05-19 NOTE — PROGRESS NOTES
Reassessment complete. Patient continues awake in bed, on SBT. Physical assessment unchanged from previous. CRRT continues to run without difficulty. Patient refusing repositioning at this time. Patient's family at bedside.

## 2022-05-19 NOTE — PROGRESS NOTES
RT Nebulizer Bronchodilator Protocol Note    There is a bronchodilator order in the chart from a provider indicating to follow the RT Bronchodilator Protocol and there is an Initiate RT Bronchodilator Protocol order as well (see protocol at bottom of note). CXR Findings:  XR CHEST PORTABLE    Result Date: 5/18/2022  Stable appearance of the chest with bilateral pleural effusions and associated airspace change in the lower lung zones. The findings from the last RT Protocol Assessment were as follows:  Smoking: (P) Chronic pulmonary disease  Respiratory Pattern: (P) Regular pattern and RR 12-20 bpm  Breath Sounds: (P) Slightly diminished and/or crackles  Cough: (P) Weak, non-productive  Indication for Bronchodilator Therapy: (P) On home bronchodilators  Bronchodilator Assessment Score: (P) 7    Aerosolized bronchodilator medication orders have been revised according to the RT Nebulizer Bronchodilator Protocol below. Respiratory Therapist to perform RT Therapy Protocol Assessment initially then follow the protocol. Repeat RT Therapy Protocol Assessment PRN for score 0-3 or on second treatment, BID, and PRN for scores above 3. No Indications - adjust the frequency to every 6 hours PRN wheezing or bronchospasm, if no treatments needed after 48 hours then discontinue using Per Protocol order mode. If indication present, adjust the RT bronchodilator orders based on the Bronchodilator Assessment Score as indicated below. If a patient is on this medication at home then do not decrease Frequency below that used at home. 0-3 - enter or revise RT bronchodilator order(s) to equivalent RT Bronchodilator order with Frequency of every 4 hours PRN for wheezing or increased work of breathing using Per Protocol order mode.        4-6 - enter or revise RT Bronchodilator order(s) to two equivalent RT bronchodilator orders with one order with BID Frequency and one order with Frequency of every 4 hours PRN wheezing or increased work of breathing using Per Protocol order mode. 7-10 - enter or revise RT Bronchodilator order(s) to two equivalent RT bronchodilator orders with one order with TID Frequency and one order with Frequency of every 4 hours PRN wheezing or increased work of breathing using Per Protocol order mode. 11-13 - enter or revise RT Bronchodilator order(s) to one equivalent RT bronchodilator order with QID Frequency and an Albuterol order with Frequency of every 4 hours PRN wheezing or increased work of breathing using Per Protocol order mode. Greater than 13 - enter or revise RT Bronchodilator order(s) to one equivalent RT bronchodilator order with every 4 hours Frequency and an Albuterol order with Frequency of every 2 hours PRN wheezing or increased work of breathing using Per Protocol order mode. RT to enter RT Home Evaluation for COPD & MDI Assessment order using Per Protocol order mode.     Electronically signed by Michael Mercedes RCP on 5/19/2022 at 4:49 PM

## 2022-05-19 NOTE — PROGRESS NOTES
Argatroban:  APTT = 95.9 secs (5/19/22 1516). Goal 60-90 seconds  Hold Argatroban. Will recheck APTT at 2100 tonight. Will restart when APTT is below 90 seconds. Goal APTT 60-90 seconds.

## 2022-05-19 NOTE — PROGRESS NOTES
RT Inhaler-Nebulizer Bronchodilator Protocol Note    There is a bronchodilator order in the chart from a provider indicating to follow the RT Bronchodilator Protocol and there is an Initiate RT Inhaler-Nebulizer Bronchodilator Protocol order as well (see protocol at bottom of note). CXR Findings:  XR CHEST PORTABLE    Result Date: 5/18/2022  Stable appearance of the chest with bilateral pleural effusions and associated airspace change in the lower lung zones. XR CHEST PORTABLE    Result Date: 5/17/2022  Bilateral pleuroparenchymal disease, decreased on the left, but increased on the right       The findings from the last RT Protocol Assessment were as follows:   History Pulmonary Disease: Chronic pulmonary disease  Respiratory Pattern: Regular pattern and RR 12-20 bpm  Breath Sounds: Slightly diminished and/or crackles  Cough: Weak, productive  Indication for Bronchodilator Therapy: On home bronchodilators  Bronchodilator Assessment Score: 6    Aerosolized bronchodilator medication orders have been revised according to the RT Inhaler-Nebulizer Bronchodilator Protocol below. Respiratory Therapist to perform RT Therapy Protocol Assessment initially then follow the protocol. Repeat RT Therapy Protocol Assessment PRN for score 0-3 or on second treatment, BID, and PRN for scores above 3. No Indications - adjust the frequency to every 6 hours PRN wheezing or bronchospasm, if no treatments needed after 48 hours then discontinue using Per Protocol order mode. If indication present, adjust the RT bronchodilator orders based on the Bronchodilator Assessment Score as indicated below.   Use Inhaler orders unless patient has one or more of the following: on home nebulizer, not able to hold breath for 10 seconds, is not alert and oriented, cannot activate and use MDI correctly, or respiratory rate 25 breaths per minute or more, then use the equivalent nebulizer order(s) with same Frequency and PRN reasons based on the score. If a patient is on this medication at home then do not decrease Frequency below that used at home. 0-3 - enter or revise RT bronchodilator order(s) to equivalent RT Bronchodilator order with Frequency of every 4 hours PRN for wheezing or increased work of breathing using Per Protocol order mode. 4-6 - enter or revise RT Bronchodilator order(s) to two equivalent RT bronchodilator orders with one order with BID Frequency and one order with Frequency of every 4 hours PRN wheezing or increased work of breathing using Per Protocol order mode. 7-10 - enter or revise RT Bronchodilator order(s) to two equivalent RT bronchodilator orders with one order with TID Frequency and one order with Frequency of every 4 hours PRN wheezing or increased work of breathing using Per Protocol order mode. 11-13 - enter or revise RT Bronchodilator order(s) to one equivalent RT bronchodilator order with QID Frequency and an Albuterol order with Frequency of every 4 hours PRN wheezing or increased work of breathing using Per Protocol order mode. Greater than 13 - enter or revise RT Bronchodilator order(s) to one equivalent RT bronchodilator order with every 4 hours Frequency and an Albuterol order with Frequency of every 2 hours PRN wheezing or increased work of breathing using Per Protocol order mode. PATIENT WILL BENEFIT FROM TREATMENTS.      Electronically signed by Rona Fu RCP on 5/18/2022 at 9:06 PM

## 2022-05-19 NOTE — PROGRESS NOTES
Reassessment complete. Patient resting in bed. Physical assessment unchanged. Dressing to RLE changed, needed removed for doppler study. Patient refuses repositioning at this time. Patient with c/o pain, patient educated no pain medication available at this time.

## 2022-05-19 NOTE — PROGRESS NOTES
Hospitalist ICU Progress Note      PCP: Ana Paula Yañez MD    Date of Admission: 4/21/2022    Subjective:     49 y.o. male with hx of ischemic CM, paraplegia, IDDM, ESRD on HD presents with bacteremia and decubitus ulcers, septic shock   Improving sepsis - off sammi, vaso, dobutamine->  now remains on levophed,   Ongoing CRRT with fluid removal   No fevers    5/11  Patient was on Airvo and BiPAP-> worsening hypoxemia-> intubated. Continued on Levophed   Continued on CRRT     5/14  Continues to require pressors  On CRRT   Remains on mechanical vent. FiO2 45%. Awake and alert. Plan is for tracheostomy and PEG tube placement on Monday    5/15  Worsening hypoxemia today with FiO2 up to 60% and PEEP up to 8  - s/p bronchoscopy with aspiration of mucus plugs . Currently FiO2 is at 50% PEEP is at 8 .    5/16-s/p tracheostomy and PEG tube placement. 5/19- patient awake on mechanical ventilation. Tracheostomy in place. Continued CRRT. Has HIT, history argatroban drip. -still on Levophed and vasopressin.        Medications:  Reviewed    Infusion Medications    argatroban Stopped (05/19/22 0759)    norepinephrine 5 mcg/min (05/19/22 1100)    vasopressin (Septic Shock) infusion 0.03 Units/min (05/19/22 1100)    dextrose      prismaSATE BGK 4/2.5 500 mL/hr at 05/19/22 0808    prismaSATE BGK 4/2.5 500 mL/hr at 05/19/22 0808    prismaSATE BGK 4/2.5 500 mL/hr at 05/19/22 4723    sodium chloride 5 mL/hr at 05/19/22 1100     Scheduled Medications    ipratropium-albuterol  1 ampule Inhalation Q4H    midodrine  15 mg Oral TID WC    sodium chloride (Inhalant)  4 mL Nebulization Q12H    insulin glargine  15 Units SubCUTAneous Nightly    sodium chloride  500 mL IntraVENous Once    colistimethate (COLY-MYCIN) nebulization  150 mg Nebulization Q8H    collagenase   Topical Daily    sodium chloride  1,000 mL IntraVENous Once    epoetin bebo-epbx  3,000 Units SubCUTAneous Once per day on Mon Wed Fri    sennosides-docusate sodium  2 tablet Oral BID    insulin lispro  0-18 Units SubCUTAneous Q4H    pantoprazole  40 mg IntraVENous Daily    menthol-zinc oxide   Topical Daily    aspirin  81 mg Oral Nightly    sodium chloride flush  5-40 mL IntraVENous 2 times per day     PRN Meds: oxyCODONE-acetaminophen, sodium phosphate IVPB **OR** sodium phosphate IVPB **OR** sodium phosphate IVPB **OR** sodium phosphate IVPB, traZODone, ipratropium-albuterol, dextrose bolus **OR** dextrose bolus, glucagon (rDNA), dextrose, potassium chloride, magnesium sulfate, calcium gluconate **OR** calcium gluconate **OR** calcium gluconate **OR** calcium gluconate, glucose, midazolam, sodium chloride flush, sodium chloride, ondansetron **OR** ondansetron, polyethylene glycol, acetaminophen **OR** acetaminophen      Intake/Output Summary (Last 24 hours) at 5/19/2022 1141  Last data filed at 5/19/2022 1100  Gross per 24 hour   Intake 1975.75 ml   Output 1707 ml   Net 268.75 ml       Physical Exam Performed:    BP (!) 83/49   Pulse 96   Temp 96.5 °F (35.8 °C) (Bladder)   Resp 14   Ht 6' 2\" (1.88 m)   Wt 227 lb 14.4 oz (103.4 kg)   SpO2 96%   BMI 29.26 kg/m²       General appearance: intubated, on mechanical ventilation. He is awake and alert and responding appropriately  ; Chronically ill-appearing  O2 sats stable at FiO2 45%  Tracheostomy in place  Ongoing CRRT  HEENT: NAD, pupils reactive to light  Neck: Supple,  No jugular venous distention. Tracheostomy present. Respiratory: Diminished breath sounds bilaterally,  Clear, no rhonchi today  Cardiovascular: S 1 S2 heard,  Right chest HD catheter and Port noted  Abdomen: Soft, non-tender, +distended with normal bowel sounds. Colostomy noted in left LQ/ PEG present  Musculoskeletal: Right BKA with stump healthy.  Superficial skin ulcers on right thigh with no active infection noted  Left LE edema with superficial ulceration noted   Skin: see Epic wound pictures, chronic exposed hardware in lower thoracic and lumbar region             Neurologic: Awake, intubated  paraplegic , atrophy of forearm and hand muscles   Fully awake  alert oriented    Labs:   Recent Labs     05/17/22  0415 05/18/22  0355 05/19/22  0519   WBC 12.1* 12.0* 14.8*   HGB 9.1* 8.5* 8.9*   HCT 29.2* 26.7* 28.7*   PLT 86* 116* 118*     Recent Labs     05/18/22  1806 05/19/22  0014 05/19/22  0634   * 129* 130*   K 3.9 4.0 4.0   CL 97* 96* 96*   CO2 25 24 24   BUN 33* 32* 30*   CREATININE 1.2 1.0 1.0   CALCIUM 8.0* 8.3 8.5   PHOS 2.7 2.6 2.6     Recent Labs     05/18/22  0355   AST 19   ALT 11   BILIDIR 0.7*   BILITOT 0.9   ALKPHOS 214*     Recent Labs     05/18/22  0830 05/19/22  0717   INR 3.36* 2.51*     No results for input(s): Jez Edwin in the last 72 hours. Urinalysis:      Lab Results   Component Value Date    NITRU Negative 04/21/2022    WBCUA 21-50 04/21/2022    BACTERIA 4+ 04/21/2022    RBCUA  04/21/2022    BLOODU LARGE 04/21/2022    SPECGRAV >=1.030 04/21/2022    GLUCOSEU Negative 04/21/2022    GLUCOSEU >=1000 mg/dL 08/31/2010     Cultures  Blood - 4/21 - Group A , strep pyogenes and Enterococcus Faecalis   Repeat blood - NG 4/23  Urine - Ecoli , Enterococcus Faecalis   Sputum- Acinetobacter baumannii      Radiology:  XR CHEST PORTABLE   Final Result   Stable appearance of the chest with bilateral pleural effusions and   associated airspace change in the lower lung zones. XR CHEST PORTABLE   Final Result   Bilateral pleuroparenchymal disease, decreased on the left, but increased on   the right         US GUIDED PARACENTESIS   Final Result   Successful paracentesis. XR CHEST PORTABLE   Final Result   Endotracheal tube remains approximately 3 cm above the quintin. Poor inspiration with basilar airspace disease and possible effusion   primarily on the left. XR CHEST PORTABLE   Final Result   Persisting left lower lobe airspace disease with volume loss or collapse. Mucous plugging considered. Improving aeration right lower lobe. XR CHEST PORTABLE   Final Result   Persistent pleural-parenchymal disease at the lung bases, similar to prior. XR CHEST PORTABLE   Final Result   Improving left lower lobe consolidation         XR CHEST PORTABLE   Final Result   1. Unchanged position of support devices. 2. Vascular congestion, basilar opacities and small pleural effusions are   again demonstrated and without significant change. CT ABDOMEN PELVIS W IV CONTRAST Additional Contrast? None   Final Result   Increasing opacifications of the lung bases with small to moderate pleural   effusions right greater than left and adjacent opacifications which appear   greatest in the left lower lobe of worsening airspace disease and/or   atelectasis from prior comparison 04/21/2022      Similar appearance of small to moderate volume abdominopelvic ascites without   loculated or heterogeneous fluid collection otherwise evident greatest in the   perihepatic and perisplenic regions         XR CHEST PORTABLE   Final Result   1. Interval placement of endotracheal and orogastric tubes, in proper   positions. 2. Other support apparatus is stable and unchanged. 3. Stable small bilateral pleural effusions. 4. Progressive multifocal airspace opacities within the bilateral mid and   lower lung zones, likely a combination of progressive pulmonary edema and   multifocal pneumonia. XR CHEST PORTABLE   Final Result   Hypoinflated lungs with residual basilar opacities likely representing   atelectasis. The technique and hypoinflation will exaggerate the pulmonary vasculature. IR MIDLINE CATH   Final Result      XR CHEST PORTABLE   Final Result   Stable supportive devices. Increasing vascular congestion/mild pulmonary   edema. XR CHEST PORTABLE   Final Result   Low lung volumes with bibasilar atelectasis. Otherwise no acute process.          XR CHEST PORTABLE   Final Result   Supportive tubing is in normal position. Stable left basilar opacity, atelectasis versus airspace disease. XR CHEST PORTABLE   Final Result   Slight improved aeration of the right lung. Bilateral pleuroparenchymal   disease remains         XR CHEST PORTABLE   Final Result   No significant change compared to chest x-ray from 05/01/2022. XR CHEST PORTABLE   Final Result   Low lung volumes with patchy airspace disease which may represent multifocal   atelectasis. Overall stable appearing chest.  Possible trace effusions. XR CHEST PORTABLE   Final Result   Relatively stable appearance of the chest with bibasilar airspace opacities. XR CHEST PORTABLE   Final Result   Low volume study with bilateral atelectasis or airspace disease, similar to   prior, with persistent small pleural effusions. XR CHEST PORTABLE   Final Result   Stable chest.         XR CHEST PORTABLE   Final Result   Endotracheal tube tip projects at the mid intrathoracic trachea. Otherwise   stable chest.         XR CHEST PORTABLE   Final Result   Suboptimal examination due to patient rotation. The chest appears stable. XR CHEST PORTABLE   Final Result   Endotracheal tube tip projects at the thoracic inlet. Otherwise stable chest.         XR CHEST PORTABLE   Final Result   Stable chest.         XR CHEST PORTABLE   Final Result   Stable endotracheal tube located approximately 3.3 cm above the quintin. Low lung volumes. Suspected small right pleural effusion. XR CHEST PORTABLE   Final Result   Endotracheal tube in satisfactory position above the quintin      Bibasilar hypoaeration persist         CT ABDOMEN PELVIS W IV CONTRAST Additional Contrast? None   Final Result   1. Moderate amount of ascites with 3rd spacing including edematous changes   throughout the abdomen and anasarca. 2. Delgado in place. Diffuse bladder wall thickening.   Correlate for underlying cystitis. 3. No acute bowel pathology. Mild gastric distension. Diverticulosis with   no acute features. 4. Mild renal cortical scarring and asymmetric right renal atrophy, stable. No hydronephrosis. CT CHEST PULMONARY EMBOLISM W CONTRAST   Final Result   1. No evidence of acute pulmonary embolism. There is some thickening and   mild irregularity in the pulmonary arteries in the left lower lobe which may   represent chronic pulmonary embolism or secondary appearance to inflammation. No evidence of aortic aneurysm or dissection. 2. Moderate right effusion and right lower lobe atelectatic and/or   consolidative changes. Pneumonia is likely. There is severe loss of volume   in the right lung. Trace left effusion and left lower lobe atelectatic   changes are seen. 3. Moderate ascites around the spleen and liver. XR CHEST PORTABLE   Final Result   Low lung volumes. Bilateral pleural effusions with bibasilar volume loss,   right greater than left. No overt failure. IR REMOVE TUNNELED CVAD WO SQ PORT/PUMP    (Results Pending)   IR NONTUNNELED VASCULAR CATHETER > 5 YEARS    (Results Pending)   VL Extremity Venous Bilateral    (Results Pending)             Assessment/Plan:    Septic shock. Enterococcus faecalis and strep pyogenes bacteremia. Right lower extremity cellulitis  Chronic pressure ulcers on the back with exposed hardware   Source could be HD line vs exposed hardware  Patient was on vancomycin, Merrem and clindamycin.  changed to  fortaz , tygacil and steffany nebs for acinetobacter. Completed 14 days of IV Vanco. ID managing this. Currently with sputum cultures positive for Acinetobacter . Antibiotics switched , on inhaled Coly-Mycin and IV Tygacil .   Severe hypotensive shock  needing sammi, vaso, dobutamine and levophed    on hydrocortisone for shock-->weaned off   Critical care managing  Improving hypotension, improved wbc  -Currently on Levophed and vasopressin. Has been difficult to wean off.   -Try to wean as tolerated    End-stage renal disease on hemodialysis. Nephrology consulted  Ongoing CRRT-fluid removal as tolerated  -Hyponatremia. Hypertonic saline ordered by intensivist.  Plan is to replace Vas-Cath today     Acute hypoxic resp failure  On mechanical ventilation. 4/22-5/6; reintubated on 5/11/2022  Healthcare associated pneumonia. Respiratory culture shows Acinetobacter vomiting.  -Possible thoracentesis in a.m.  -Intubated in the ICU on 4/22-> extubated 5/6. On Airvo and BiPAP  -Antibiotics as above. Patient followed by pulmonologist and ID  -S/p bronch 5/1/22 given worsening leucocytosis/fever  -cx with acinetobacter - was on IV ceftaz,tygacil  and steffany nebs; currently continued on IV Tygacil, and on inhaled Coly-Mycin  -> Reintubated on 5/11/22 for worsening hypoxic hypercapnic respiratory failure  -> S/P bronch 5/15    --> S/p tracheostomy and PEG placement  on 5/16/2022.       Chronic systolic congestive heart failure, EF 25 to 30%. Ischemic CM/CAD s/p previous stents   -Cardiology consulted  -Hold home medications given hypotension  -Off dobutamine   -Resume ASA , statins     Acute metabolic encephalopathy resolved. -Secondary to septic shock  resolved     Type 2 diabetes.  -Lantus insulin and sliding scale insulin     History of DVT  Heparin-induced thrombocytopenia  -Stop heparin. 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 -[facility] by Dr. Liseth Zaragoza    Paraplegia  Neurogenic bladder    bed bound ,supportive care  - intermittent self catherizations , now has langley     Ascites  - paracentesis done and 3 L of fluid was removed. No subacute bacterial peritonitis.       Diet: Diet NPO  ADULT TUBE FEEDING; PEG; Renal Formula; Continuous; 20; Yes; 15; Q 4 hours; 35; 30; Q 4 hours; Protein; one proteinex P2Go TWICE daily via feeding tube  Code Status: Full Code  Dispo - cont care    Discussed with ICU LIONEL Sherman MD 5/19/2022 11:41 AM
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
## 2022-05-19 NOTE — PROGRESS NOTES
APtt= 98.4 secs  Continue to hold, Ptt at 1400  Restart when Ptt below 90 secs  Pito Lazar Pharm D 5/19/202211:50 AM  .

## 2022-05-19 NOTE — PROGRESS NOTES
FiO2 decraesed to 30%  Pt remains on PS 12/5 05/19/22 1224   NICU Vent Information   Vt (Set, mL) 0 mL   Vt Exhaled 483 mL   Resp Rate (Set) 0 bmp   Rate Measured 15 br/min   Minute Volume 7.14 Liters   Peak Flow 0 L/min   Pressure Support 12 cmH20   FiO2  30 %   Peak Inspiratory Pressure 18 cmH2O   I:E Ratio 1:3.10   Sensitivity 2   High Peep/I Pressure 0   PEEP/CPAP (cmH2O) 5   I Time/ I Time % 0 s   Mean Airway Pressure 8.9 cmH20   Cough/Sputum   Sputum How Obtained Tracheal;Suctioned   Cough Non-productive   Breath Sounds   Right Upper Lobe Diminished;Clear   Right Middle Lobe Diminished   Right Lower Lobe Diminished   Left Upper Lobe Clear;Diminished   Left Lower Lobe Diminished   Additional Respiratory Assessments   Pulse 92   Resp 13   SpO2 97 %   Position Semi-Mejía's   Humidification Source Heated wire   Humidification Temp 37   Vent Alarm Settings   High Pressure  50 cmH2O   Low Minute Volume Alarm 3 L/min   RR High 40 br/min   Surgical Airway (Trach) 05/16/22 Dao Cuffed   Placement Date/Time: 05/16/22 0946   Present on Admission/Arrival: No  Placed By: In surgery  Surgical Airway Type: Tracheostomy  Brand: Dao  Style: (c) Cuffed  Size (mm): 6   Status Secured   Site Assessment Dry;Clean

## 2022-05-19 NOTE — PROGRESS NOTES
Pt placed on PS 12/5, FiO2 35%         05/19/22 0830   NICU Vent Information   Vt (Set, mL) 0 mL   Vt Exhaled 390 mL   Resp Rate (Set) 0 bmp   Rate Measured 24 br/min   Minute Volume 8.94 Liters   Peak Flow 0 L/min   Pressure Support 12 cmH20   FiO2  35 %   Peak Inspiratory Pressure 19 cmH2O   I:E Ratio 1:2.40   Sensitivity 2   High Peep/I Pressure 0   PEEP/CPAP (cmH2O) 5   I Time/ I Time % 0 s   Mean Airway Pressure 10 cmH20   Cough/Sputum   Sputum How Obtained Tracheal;Suctioned   Cough Non-productive   Breath Sounds   Right Upper Lobe Clear;Diminished   Right Middle Lobe Diminished   Right Lower Lobe Diminished   Left Upper Lobe Clear;Diminished   Left Lower Lobe Diminished   Additional Respiratory Assessments   Pulse 102   Resp 28   Position Semi-Mejía's   Humidification Source Heated wire   Humidification Temp 37   Cuff Pressure (cm H2O) 26 cm H2O   Vent Alarm Settings   High Pressure  50 cmH2O   Low Minute Volume Alarm 3 L/min   RR High 40 br/min   Surgical Airway (Trach) 05/16/22 [place] Cuffed   Placement Date/Time: 05/16/22 0946   Present on Admission/Arrival: No  Placed By: In surgery  Surgical Airway Type: Tracheostomy  Brand: Dao  Style: (c) Cuffed  Size (mm): 6   Status Secured   Site Assessment Dry;Clean

## 2022-05-19 NOTE — PROGRESS NOTES
EOS report given to Nikos Bustos RN. -Levo as high as 8 overnight, now down to 5 mcg/min; following new pressor parameters of MAP > 60.    -aPTT @ 2200 = 93.1, AC remained off. aPTT @ 0200 = 79.2, argatroban restarted at 0.25 mcg/kg/min per pharmacy. Recheck aPTT at 0730 - results pending. INR added on in prep for VC replacement.    -iCa+ 1.12 at 0000, 1 g CaGluc administered. No replacements needed on 0600 labs. -PRN percocet administered at 0100 around CHG bath and wound care. PRN versed requested and administered around 0430 to help pt sleep. No other changes noted overnight. Pt stable at this time, care transferred.     Electronically signed by Eloy Caceres RN on 5/19/2022 at 7:22 AM
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
## 2022-05-20 NOTE — PROGRESS NOTES
RT Inhaler-Nebulizer Bronchodilator Protocol Note    There is a bronchodilator order in the chart from a provider indicating to follow the RT Bronchodilator Protocol and there is an Initiate RT Inhaler-Nebulizer Bronchodilator Protocol order as well (see protocol at bottom of note). CXR Findings:  No results found. The findings from the last RT Protocol Assessment were as follows:   History Pulmonary Disease: (P) Chronic pulmonary disease  Respiratory Pattern: (P) Regular pattern and RR 12-20 bpm  Breath Sounds: (P) Slightly diminished and/or crackles  Cough: (P) Weak, non-productive  Indication for Bronchodilator Therapy: (P) On home bronchodilators  Bronchodilator Assessment Score: (P) 7    Aerosolized bronchodilator medication orders have been revised according to the RT Inhaler-Nebulizer Bronchodilator Protocol below. Respiratory Therapist to perform RT Therapy Protocol Assessment initially then follow the protocol. Repeat RT Therapy Protocol Assessment PRN for score 0-3 or on second treatment, BID, and PRN for scores above 3. No Indications - adjust the frequency to every 6 hours PRN wheezing or bronchospasm, if no treatments needed after 48 hours then discontinue using Per Protocol order mode. If indication present, adjust the RT bronchodilator orders based on the Bronchodilator Assessment Score as indicated below. Use Inhaler orders unless patient has one or more of the following: on home nebulizer, not able to hold breath for 10 seconds, is not alert and oriented, cannot activate and use MDI correctly, or respiratory rate 25 breaths per minute or more, then use the equivalent nebulizer order(s) with same Frequency and PRN reasons based on the score. If a patient is on this medication at home then do not decrease Frequency below that used at home.     0-3 - enter or revise RT bronchodilator order(s) to equivalent RT Bronchodilator order with Frequency of every 4 hours PRN for wheezing or increased work of breathing using Per Protocol order mode. 4-6 - enter or revise RT Bronchodilator order(s) to two equivalent RT bronchodilator orders with one order with BID Frequency and one order with Frequency of every 4 hours PRN wheezing or increased work of breathing using Per Protocol order mode. 7-10 - enter or revise RT Bronchodilator order(s) to two equivalent RT bronchodilator orders with one order with TID Frequency and one order with Frequency of every 4 hours PRN wheezing or increased work of breathing using Per Protocol order mode. 11-13 - enter or revise RT Bronchodilator order(s) to one equivalent RT bronchodilator order with QID Frequency and an Albuterol order with Frequency of every 4 hours PRN wheezing or increased work of breathing using Per Protocol order mode. Greater than 13 - enter or revise RT Bronchodilator order(s) to one equivalent RT bronchodilator order with every 4 hours Frequency and an Albuterol order with Frequency of every 2 hours PRN wheezing or increased work of breathing using Per Protocol order mode.      Home tx reg    Electronically signed by Francisca Albert RCP on 5/20/2022 at 7:57 AM
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
## 2022-05-20 NOTE — PROGRESS NOTES
Shift assessment completed as documented Pt tolerating trach and mechanical ventilation without concerns. Pressor support per [facility]. Resting with eyes closed. Delgado patent to bsd. Call light within reach. Tolerating tube feeding. Monitoring closely.

## 2022-05-20 NOTE — PROGRESS NOTES
Critical Care Medicine ICU Progress Note    CC: Septic shock, respiratory failure    Events of Last 24 hours:   received versed 4 mg   S/p Trach   Doing good with PSV 12/5 ,at night AC  Still on Levophed 7 mcg/min   Vasopressin 0.03 unit/minute  argatroban   CRRTon hold    PEEP5   FiO2 40 %  HIT score 4 and HIT +   restart elquis 5 mg bid'  Feeding at goal ,had BM           Invasive Lines:   Vas cath changed and tunneled removed    ,trilysis catheter   Sub clavian port     MV: 4/22- 5/6/2022. Reintubated 5/11 for inability to clear secretions and hypercapnia  Vent Mode: AC/VC Resp Rate (Set): 16 bmp/Vt (Set, mL): 490 mL/ /FiO2 : 30 %  Recent Labs     05/18/22  0355 05/19/22  0519   PHART 7.433 7.377   [id number] 33.4* 37.1   PO2ART 87.1 113.9*       IV:   norepinephrine 7 mcg/min (05/20/22 0705)    vasopressin (Septic Shock) infusion 0.03 Units/min (05/20/22 0705)    dextrose      prismaSATE BGK 4/2.5 500 mL/hr at 05/20/22 0414    prismaSATE BGK 4/2.5 500 mL/hr at 05/20/22 0414    prismaSATE BGK 4/2.5 500 mL/hr at 05/20/22 0414    sodium chloride 5 mL/hr at 05/19/22 1900       Intake/Output Summary (Last 24 hours) at 5/20/2022 0906  Last data filed at 5/20/2022 0705  Gross per 24 hour   Intake 1765 ml   Output 1645 ml   Net 120 ml     BP (!) 94/57   Pulse 97   Temp 97.6 °F (36.4 °C) (Bladder)   Resp 16   Ht 6' 2\" (1.88 m)   Wt 228 lb 8 oz (103.6 kg)   SpO2 95%   BMI 29.34 kg/m²   16/490/8  General: ill appearing. Intubated sedated. Eyes: PERRL. No sclera icterus. No conjunctival injection. ENT: No discharge. Pharynx clear. ET tube in place  Neck: Trachea midline. Normal thyroid. Resp: No accessory muscle use. Few crackles. No wheezing. Few rhonchi. CV: Regular rate. Regular rhythm. No mumur or rub. No edema. GI: Non-tender. Non-distended. No masses. Skin: Warm and dry. No nodule on exposed extremities. No rash on exposed extremities.   Lymph: No cervical LAD. No supraclavicular LAD. M/S: No cyanosis. No joint deformity. No clubbing. Neuro: Intubated. Alert and awake. Following commands.  Profound weakness with very poor ineffective cough   Psych: Noncommunicative unable to obtain      Scheduled Meds:   ipratropium-albuterol  1 ampule Inhalation Q4H    midodrine  15 mg Oral TID WC    sodium chloride (Inhalant)  4 mL Nebulization Q12H    insulin glargine  15 Units SubCUTAneous Nightly    sodium chloride  500 mL IntraVENous Once    colistimethate (COLY-MYCIN) nebulization  150 mg Nebulization Q8H    collagenase   Topical Daily    sodium chloride  1,000 mL IntraVENous Once    epoetin bebo-epbx  3,000 Units SubCUTAneous Once per day on Mon Wed Fri    sennosides-docusate sodium  2 tablet Oral BID    insulin lispro  0-18 Units SubCUTAneous Q4H    pantoprazole  40 mg IntraVENous Daily    menthol-zinc oxide   Topical Daily    aspirin  81 mg Oral Nightly    sodium chloride flush  5-40 mL IntraVENous 2 times per day     PRN Meds:  oxyCODONE-acetaminophen, sodium phosphate IVPB **OR** sodium phosphate IVPB **OR** sodium phosphate IVPB **OR** sodium phosphate IVPB, traZODone, ipratropium-albuterol, dextrose bolus **OR** dextrose bolus, glucagon (rDNA), dextrose, potassium chloride, magnesium sulfate, calcium gluconate **OR** calcium gluconate **OR** calcium gluconate **OR** calcium gluconate, glucose, midazolam, sodium chloride flush, sodium chloride, ondansetron **OR** ondansetron, polyethylene glycol, acetaminophen **OR** acetaminophen    Results:  CBC:   Recent Labs     05/18/22  0355 05/19/22  0519 05/20/22  0220   WBC 12.0* 14.8* 15.2*   HGB 8.5* 8.9* 8.5*   HCT 26.7* 28.7* 28.3*   MCV 86.5 87.1 88.8   * 118* 169     BMP:   Recent Labs     05/19/22  1809 05/19/22  2327 05/20/22  0434   * 133* 131*   K 3.8 4.1 3.7   CL 98* 99 98*   CO2 27 26 26   PHOS 2.2* 2.6 2.0*   BUN 24* 23* 22*   CREATININE 0.9 0.8* 0.8*     LIVER PROFILE:   Recent Labs 05/18/22  0355   AST 19   ALT 11   BILIDIR 0.7*   BILITOT 0.9   ALKPHOS 214*        Cultures:      4/21/2022 blood 202 Enterococcus faecalis (sensitive to ampicillin, gentamicin, vancomycin) and Streptococcus pyogenes  4/21/2022 SARS-CoV-2 and influenza are negative  4/21/2022 urine Enterococcus and E. Coli  4/30 trach aspirate: acinetobacter  5/1/22 Bronch bal: Acinetobacter  5/12 BAL Acinetobacter baumannii     CTPA 4/21/2022  Impression   1. No evidence of acute pulmonary embolism. Kennth [place]chilla is some thickening and   mild irregularity in the pulmonary arteries in the left lower lobe which may   represent chronic pulmonary embolism or secondary appearance to inflammation. No evidence of aortic aneurysm or dissection. 2. Moderate right effusion and right lower lobe atelectatic and/or   consolidative changes.  Pneumonia is likely. Kennth [place]ch[place] is severe loss of volume   in the right lung.  Trace left effusion and left lower lobe atelectatic   changes are seen. 3. Moderate ascites around the spleen and liver. ACT 4/21/22  Impression   1. Moderate amount of ascites with 3rd spacing including edematous changes   throughout the abdomen and anasarca. 2. Delgado in place.  Diffuse bladder wall thickening.  Correlate for   underlying cystitis. 3. No acute bowel pathology.  Mild gastric distension.  Diverticulosis with   no acute features. 4. Mild renal cortical scarring and asymmetric right renal atrophy, stable. No hydronephrosis.      Chest x-ray 5/15 imaging reviewed by me and showed  Satisfactory ETT position- 26 cm   Bibasilar ASD with RLL collapse           ASSESSMENT:  · Acute on chronic hypoxemic respiratory failure  · VAP/HCAP  · RLL collapse, mucus plug, pulmonary congestion with poor ineffective cough- today with high PAP  · Septic shock  · Bacteremia: Enterococcus faecalis and Streptococcus  · Dysphagia- FEES yesterday with secretion above VC  · Ischemic cardiomyopathy EF 25 to 30%  · PVCs with NSVT · Right lower extremity cellulitis, H/O R BKA  · Chronic T-spine osteomyelitis is managed by Dr. Liseth Zaragoza  · Chronic decubitus ulcers  · End-stage kidney disease on HD  · LIBORIO  · H/O DVT on home Eliquis  · Paraplegia 2/2 [facility] 2013    PLAN:  hS  change HD cathter   Argatroban TO BE CHANGED TO ELQUIS   Right side effusion ,will try drain at some point when more stable   Start argatroban   CXR SHOWING STABLE EFFUSION ,WOULD LIKE TO DRAIN ,HOWEVERE WILL SEE IF hd WILL IMPROVE IT SINCE WE STARTED ELQUIS,IF NOT THEN MAY HAVE TO HOLD IT LATER AND DO IT IF EFFUSION GOT WORSE     Acinetobacter baumannii pneumonia as per ID ,  Had trach as  he fail extubation multiple times ,some secrtions   Weaning pressors ,CRRT on hold as well and on midodrine   ID following   Multiple bronch ,had trach today   PSV now    On elquis ,HIT + was argatroban   Pulmonary toielt 3 % saline  metanep  On tube feeding ,have BM   Had EGD ,  Discuss with family in details   Negative 12 L   Wean from MV   Goal to waen pressors and I am ok with MAP 60 as long as he is fully awake   Discussed with family in details      Care reviewed with nursing staff, medical and surgical specialty care, primary care and the patient's family as available. Chart review/lab review/X-ray viewing/documentation and had long Conversation with patient/family re: prognosis, care options and any end of life issues:      Critical care time spent reviewing labs/films, examining patient, collaborating with other physicians more than 35  Minutes  excluding procedures . Adolfo Tristan M.D.   5/20/2022  9:06 AM

## 2022-05-20 NOTE — CARE COORDINATION
INTERDISCIPLINARY PLAN OF CARE CONFERENCE    Date/Time: 5/20/2022 9:04 AM  Completed by: Evan GONCALVES, CONRAD   Case Management    Patient Name:  Meghan Mercer  YOB: 1965  Admitting Diagnosis: Hyperkalemia [E87.5]  Hypoglycemia [E16.2]  ESRD on dialysis ([facility] [address] [N18.6, Z99.2]  Acute cystitis with hematuria [N30.01]  Septic shock ([facility] [address] [A41.9, R65.21]  Sepsis ([facility] [address] [A41.9]  Pneumonia due to infectious organism, unspecified laterality, unspecified part of lung [J18.9]     Admit Date/Time:  4/21/2022  1:21 PM    Chart reviewed. Interdisciplinary team contacted or reviewed plan related to patient progress and discharge plans. Disciplines included Case Management, Nursing, and Dietitian. Current Status:Ongoing   PT/OT recommendation for discharge plan of care: n/a    Expected D/C Disposition:  [address] [facility]) vs TBD    Discharge Plan Comments: Chart review completed. Completed Interdisciplinary rounds with ICU staff. Pt remains in the ICU. Received notification to call pt's son Albania Goodwin between 1-230pm; will call Albania Goodwin during this time to follow up. CM will continue to follow and assist. Please notify CM if needs or concerns arise. Home O2 in place on admit: Yes    Addendum at 1:55pm: Called and spoke with pt's son Albania Goodwin. Albania Goodwin states that they are still looking at the [facility] list but they had some questions regarding waiting list, etc at [facility]. Explained that if there is a waiting list, pt would stay at the hospital until bed opening unless we decided another facility. Albania Goodwin states understanding and that he will discuss with pt/family and follow up with writer on Monday.  Number given

## 2022-05-20 NOTE — CONSULTS
Pt seen for ostomy appliance change. Old appliance removed, in place since 5/12 and intact with good seal on back of old wafer. Old appliance removed. Skin cleansed and patted dry. Stoma pink moist and slightly budded with intact peristomal skin. Lashonda ring placed around stoma and then pt repouched using 2 piece 2 1/4\" flat wafer and drainable pouch with good seal.  Family at bedside. Pt awake and nods head to yes no questions. Discussed turing pt to assess back wounds today. Pt having EKG at this time, BP low.   Staff RN assessing

## 2022-05-20 NOTE — PROGRESS NOTES
5/20  aPTT = 83.3 sec at 0220. Argatroban will continue to be held for procedure. Unclear when argatroban gtt will be resumed but will likely need a new baselines aPTT.   Francois Dorado, PharmD  5/20/2022 2:47 AM

## 2022-05-20 NOTE — PROGRESS NOTES
05/20/22 1915   Patient Observation   Pulse 103   Resp 18   SpO2 93 %   Breath Sounds   Right Upper Lobe Diminished   Right Middle Lobe Diminished   Right Lower Lobe Diminished   Left Upper Lobe Diminished   Left Lower Lobe Diminished   Airway Clearance   Suction Trach   Suction Device Inline suction catheter   Sputum Method Obtained Tracheal   Sputum Amount Scant   Sputum Color/Odor White   Sputum Consistency Thick   Vent Information   Vent Mode AC/VC   Vent Settings   FiO2  30 %   Resp Rate (Set) 16 bmp   Vt (Set, mL) 490 mL   PEEP/CPAP (cmH2O) 5   Trigger Sensitivity Flow (L/min) 3 L/min   Vent Patient Data (Readings)   Vt Exhaled 463 mL   Rate Measured 18 br/min   Minute Volume 7.69 Liters   Peak Flow 0 L/min   Pressure Support 0 cmH20   I:E Ratio 1:4.90   Sensitivity 2   Peak Inspiratory Pressure 24 cmH2O   Mean Airway Pressure 8.69 cmH20   High Peep/I Pressure 0   I Time/ I Time % 0 s   Plateau Pressure (cm H2O) 22 cm H2O   Static Compliance (L/cm H2O) 27   Dynamic Compliance (L/cm H2O) 24 L/cm H2O   Vent Alarm Settings   High Pressure  50 cmH2O   Low Minute Volume Alarm 3 L/min   Ve Max 20   Ve Min 3   Low Exhaled Vt  200 mL   Vt Low  300 mL   Vt High  1000 mL   RR High 30 br/min   Apnea (Secs) 20 secs   Ambu Bag With Mask At Bedside Yes   Additional Respiratory Assessments   Position Semi-Mejía's   Humidification Source Heated wire   Humidification Temp 37   Circuit Condensation Drained   Surgical Airway (Trach) 05/16/22 Dao Cuffed   Placement Date/Time: 05/16/22 0946   Present on Admission/Arrival: No  Placed By: In surgery  Surgical Airway Type: Tracheostomy  Brand: Dao  Style: (c) Cuffed  Size (mm): 6   Status Secured   Site Assessment Clean;Dry   Ties Assessment Clean;Dry; Intact

## 2022-05-20 NOTE — PROGRESS NOTES
Hospitalist ICU Progress Note      PCP: Temitope العلي MD    Date of Admission: 4/21/2022    Subjective:     49 y.o. male with hx of ischemic CM, paraplegia, IDDM, ESRD on HD presents with bacteremia and decubitus ulcers, septic shock . off sammi, dobutamine->  remains on levophed, vasopressin  Ongoing CRRT with fluid removal     5/11  Patient was on Airvo and BiPAP-> worsening hypoxemia-> intubated.  -> Continues to require pressors  -> On CRRT     5/15 s/p bronchoscopy with aspiration of mucus plugs . 5/16-s/p tracheostomy and PEG tube placement. 5/20   Patient remains on mechanical ventilation, tracheostomy in place. FiO2 30% and PEEP of 5. He is awake and responding. Continued on CRRT, hemodialysis cath was exchanged. Remains on pressors Levophed and vasopressin. Tele-> tachyarrhythmia now. He has ischemic cardiomyopathy, nonsustained V. tach has been documented earlier in the admission and was seen by cardiology. Has HIT, on argatroban drip-> plan to switch to Eliquis today.          Medications:  Reviewed    Infusion Medications    norepinephrine 6 mcg/min (05/20/22 1052)    vasopressin (Septic Shock) infusion 0.03 Units/min (05/20/22 1050)    dextrose      prismaSATE BGK 4/2.5 500 mL/hr at 05/20/22 0414    prismaSATE BGK 4/2.5 500 mL/hr at 05/20/22 0414    prismaSATE BGK 4/2.5 500 mL/hr at 05/20/22 0414    sodium chloride 5 mL/hr at 05/19/22 1900     Scheduled Medications    apixaban  5 mg Oral BID    ipratropium-albuterol  1 ampule Inhalation Q4H    midodrine  15 mg Oral TID WC    sodium chloride (Inhalant)  4 mL Nebulization Q12H    insulin glargine  15 Units SubCUTAneous Nightly    sodium chloride  500 mL IntraVENous Once    colistimethate (COLY-MYCIN) nebulization  150 mg Nebulization Q8H    collagenase   Topical Daily    sodium chloride  1,000 mL IntraVENous Once    epoetin bebo-epbx  3,000 Units SubCUTAneous Once per day on Mon Wed Fri    sennosides-docusate Neurologic: Awake, intubated  paraplegic , atrophy of forearm and hand muscles   Fully awake  alert oriented    Labs:   Recent Labs     05/18/22  0355 05/19/22  0519 05/20/22  0220   WBC 12.0* 14.8* 15.2*   HGB 8.5* 8.9* 8.5*   HCT 26.7* 28.7* 28.3*   * 118* 169     Recent Labs     05/19/22  1809 05/19/22  2327 05/20/22  0434   * 133* 131*   K 3.8 4.1 3.7   CL 98* 99 98*   CO2 27 26 26   BUN 24* 23* 22*   CREATININE 0.9 0.8* 0.8*   CALCIUM 8.5 8.5 8.4   PHOS 2.2* 2.6 2.0*     Recent Labs     05/18/22  0355   AST 19   ALT 11   BILIDIR 0.7*   BILITOT 0.9   ALKPHOS 214*     Recent Labs     05/18/22  0830 05/19/22  0717 05/20/22  0220   INR 3.36* 2.51* 1.89*     No results for input(s): Rupert Devonte in the last 72 hours. Urinalysis:      Lab Results   Component Value Date    NITRU Negative 04/21/2022    WBCUA 21-50 04/21/2022    BACTERIA 4+ 04/21/2022    RBCUA  04/21/2022    BLOODU LARGE 04/21/2022    SPECGRAV >=1.030 04/21/2022    GLUCOSEU Negative 04/21/2022    GLUCOSEU >=1000 mg/dL 08/31/2010     Cultures  Blood - 4/21 - Group A , strep pyogenes and Enterococcus Faecalis   Repeat blood - NG 4/23  Urine - Ecoli , Enterococcus Faecalis   Sputum- Acinetobacter baumannii      Radiology:  XR CHEST PORTABLE   Preliminary Result   Right internal jugular catheter with tip in the region of the inferior SVC,   although obscured by the spinal hardware. No pneumothorax. Otherwise no significant change. VL Extremity Venous Bilateral   Final Result      XR CHEST PORTABLE   Final Result   Stable appearance of the chest with bilateral pleural effusions and   associated airspace change in the lower lung zones. XR CHEST PORTABLE   Final Result   Bilateral pleuroparenchymal disease, decreased on the left, but increased on   the right         US GUIDED PARACENTESIS   Final Result   Successful paracentesis.          XR CHEST PORTABLE   Final Result   Endotracheal tube remains approximately 3 cm above the quintin. Poor inspiration with basilar airspace disease and possible effusion   primarily on the left. XR CHEST PORTABLE   Final Result   Persisting left lower lobe airspace disease with volume loss or collapse. Mucous plugging considered. Improving aeration right lower lobe. XR CHEST PORTABLE   Final Result   Persistent pleural-parenchymal disease at the lung bases, similar to prior. XR CHEST PORTABLE   Final Result   Improving left lower lobe consolidation         XR CHEST PORTABLE   Final Result   1. Unchanged position of support devices. 2. Vascular congestion, basilar opacities and small pleural effusions are   again demonstrated and without significant change. CT ABDOMEN PELVIS W IV CONTRAST Additional Contrast? None   Final Result   Increasing opacifications of the lung bases with small to moderate pleural   effusions right greater than left and adjacent opacifications which appear   greatest in the left lower lobe of worsening airspace disease and/or   atelectasis from prior comparison 04/21/2022      Similar appearance of small to moderate volume abdominopelvic ascites without   loculated or heterogeneous fluid collection otherwise evident greatest in the   perihepatic and perisplenic regions         XR CHEST PORTABLE   Final Result   1. Interval placement of endotracheal and orogastric tubes, in proper   positions. 2. Other support apparatus is stable and unchanged. 3. Stable small bilateral pleural effusions. 4. Progressive multifocal airspace opacities within the bilateral mid and   lower lung zones, likely a combination of progressive pulmonary edema and   multifocal pneumonia. XR CHEST PORTABLE   Final Result   Hypoinflated lungs with residual basilar opacities likely representing   atelectasis. The technique and hypoinflation will exaggerate the pulmonary vasculature.          IR MIDLINE CATH   Final Result XR CHEST PORTABLE   Final Result   Stable supportive devices. Increasing vascular congestion/mild pulmonary   edema. XR CHEST PORTABLE   Final Result   Low lung volumes with bibasilar atelectasis. Otherwise no acute process. XR CHEST PORTABLE   Final Result   Supportive tubing is in normal position. Stable left basilar opacity, atelectasis versus airspace disease. XR CHEST PORTABLE   Final Result   Slight improved aeration of the right lung. Bilateral pleuroparenchymal   disease remains         XR CHEST PORTABLE   Final Result   No significant change compared to chest x-ray from 05/01/2022. XR CHEST PORTABLE   Final Result   Low lung volumes with patchy airspace disease which may represent multifocal   atelectasis. Overall stable appearing chest.  Possible trace effusions. XR CHEST PORTABLE   Final Result   Relatively stable appearance of the chest with bibasilar airspace opacities. XR CHEST PORTABLE   Final Result   Low volume study with bilateral atelectasis or airspace disease, similar to   prior, with persistent small pleural effusions. XR CHEST PORTABLE   Final Result   Stable chest.         XR CHEST PORTABLE   Final Result   Endotracheal tube tip projects at the mid intrathoracic trachea. Otherwise   stable chest.         XR CHEST PORTABLE   Final Result   Suboptimal examination due to patient rotation. The chest appears stable. XR CHEST PORTABLE   Final Result   Endotracheal tube tip projects at the thoracic inlet. Otherwise stable chest.         XR CHEST PORTABLE   Final Result   Stable chest.         XR CHEST PORTABLE   Final Result   Stable endotracheal tube located approximately 3.3 cm above the quintin. Low lung volumes. Suspected small right pleural effusion.          XR CHEST PORTABLE   Final Result   Endotracheal tube in satisfactory position above the quintin      Bibasilar hypoaeration persist         CT ABDOMEN PELVIS W IV CONTRAST Additional Contrast? None   Final Result   1. Moderate amount of ascites with 3rd spacing including edematous changes   throughout the abdomen and anasarca. 2. Delgado in place. Diffuse bladder wall thickening. Correlate for   underlying cystitis. 3. No acute bowel pathology. Mild gastric distension. Diverticulosis with   no acute features. 4. Mild renal cortical scarring and asymmetric right renal atrophy, stable. No hydronephrosis. CT CHEST PULMONARY EMBOLISM W CONTRAST   Final Result   1. No evidence of acute pulmonary embolism. There is some thickening and   mild irregularity in the pulmonary arteries in the left lower lobe which may   represent chronic pulmonary embolism or secondary appearance to inflammation. No evidence of aortic aneurysm or dissection. 2. Moderate right effusion and right lower lobe atelectatic and/or   consolidative changes. Pneumonia is likely. There is severe loss of volume   in the right lung. Trace left effusion and left lower lobe atelectatic   changes are seen. 3. Moderate ascites around the spleen and liver. XR CHEST PORTABLE   Final Result   Low lung volumes. Bilateral pleural effusions with bibasilar volume loss,   right greater than left. No overt failure. Assessment/Plan:    Septic shock. Enterococcus faecalis and strep pyogenes bacteremia. Right lower extremity cellulitis  Chronic pressure ulcers on the back with exposed hardware   Source could be HD line vs exposed hardware  Patient was on vancomycin, Merrem and clindamycin.  changed to  fortaz , tygacil and steffany nebs for acinetobacter. Completed 14 days of IV Vanco. ID managing this. Currently with sputum cultures positive for Acinetobacter . Antibiotics switched to  inhaled Coly-Mycin and IV Tygacil . ID following and currently continued on inhaled Coly-Mycin.    Severe hypotensive shock  needing sammi, vaso, dobutamine and levophed    on hydrocortisone for shock-->weaned off   Critical care managing  -Persistent hypotension , remains on pressors-  Levophed and vasopressin. Has been difficult to wean off. Try to wean as tolerated    End-stage renal disease on hemodialysis. Nephrology consulted  Ongoing CRRT- fluid removal as tolerated  -Electrolytes being monitored and adjusted with CRRT  -Nephrology following, Vas-Cath was exchanged. Acute hypoxic resp failure  On mechanical ventilation. 4/22-5/6; reintubated on 5/11/2022  Healthcare associated pneumonia. Respiratory culture shows Acinetobacter baumannii  -Intubated in the ICU on 4/22-> extubated 5/6. On Airvo and BiPAP-> reintubated on 5/11/2022.   -Antibiotics as above. Patient followed by pulmonologist and ID  -S/p bronch 5/1/22 given worsening leucocytosis/fever  -cx with acinetobacter - was on IV ceftaz,tygacil and steffany nebs; then switched to IV Tygacil, and on inhaled Coly-Mycin. Currently on inh Coly-Mycin  -> Reintubated on 5/11/22 for worsening hypoxic hypercapnic respiratory failure  -> S/P bronch 5/15    --> S/p tracheostomy and PEG placement  on 5/16/2022.       Chronic systolic congestive heart failure, EF 25 to 30%. Ischemic cardiomyopathy  CAD s/p previous stents   Tachyarrhythmia/ NSVT  -Was seen by cardiology earlier in the admission , not following now . -  home medications on hold given hypotension  -Off dobutamine   -Resume ASA , statins  -Recurrent tachycardia today, monitor and replace electrolytes. Check EKG. Will give IV digoxin x1 dose     Acute metabolic encephalopathy resolved. -Secondary to septic shock  resolved     Type 2 diabetes.  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   -[facility] by Dr. Dasha Chun    Paraplegia  Neurogenic bladder   -bed bound ,supportive care  - intermittent self catherizations at home. now has langley-> plan DC Langley. Continue intermittent straight cath    Ascites  -paracentesis done and 3 L of fluid was removed. No subacute bacterial peritonitis.       Diet: Diet NPO  ADULT TUBE FEEDING; PEG; Renal Formula; Continuous; 35; Yes; 10; Q 4 hours; 45; 30; Q 4 hours; Protein; one proteinex P2Go TWICE daily via feeding tube  Code Status: Full Code  Dispo - cont care    Discussed with ICU RN      Jina Watt MD 5/20/2022 11:50 AM

## 2022-05-20 NOTE — PROGRESS NOTES
05/19/22 2301   NICU Vent Information   Vent Type 980   Vent Mode AC/VC   Vt (Set, mL) 490 mL   Vt Exhaled 519 mL   Resp Rate (Set) 16 bmp   Rate Measured 17 br/min   Minute Volume 8.39 Liters   Peak Flow 50 L/min   Pressure Support 0 cmH20   FiO2  30 %   Peak Inspiratory Pressure 23 cmH2O   I:E Ratio 1:2.50   Sensitivity 2   High Peep/I Pressure 0   PEEP/CPAP (cmH2O) 5   I Time/ I Time % 0 s   Mean Airway Pressure 11 cmH20   Cough/Sputum   Sputum How Obtained Suctioned;Tracheal   Sputum Amount None   Breath Sounds   Right Upper Lobe Diminished   Right Middle Lobe Diminished   Right Lower Lobe Diminished   Left Upper Lobe Diminished   Left Lower Lobe Diminished   Additional Respiratory Assessments   Pulse 99   Resp 14   SpO2 93 %   Vent Alarm Settings   High Pressure  50 cmH2O   Low Minute Volume Alarm 3 L/min   RR High 30 br/min

## 2022-05-20 NOTE — PROGRESS NOTES
05/20/22 0320   NICU Vent Information   Vent Type 980   Vent Mode AC/VC   Vt (Set, mL) 490 mL   Vt Exhaled 503 mL   Resp Rate (Set) 16 bmp   Rate Measured 16 br/min   Minute Volume 8.08 Liters   Peak Flow 50 L/min   Pressure Support 0 cmH20   FiO2  30 %   Peak Inspiratory Pressure 25 cmH2O   I:E Ratio 1:2.50   Sensitivity 2   High Peep/I Pressure 0   PEEP/CPAP (cmH2O) 5   I Time/ I Time % 0 s   Mean Airway Pressure 12 cmH20   Cough/Sputum   Sputum How Obtained Suctioned;Tracheal   Sputum Amount None   Breath Sounds   Right Upper Lobe Diminished   Right Middle Lobe Diminished   Right Lower Lobe Diminished   Left Upper Lobe Diminished   Left Lower Lobe Diminished   Additional Respiratory Assessments   Pulse 93   Resp 16   SpO2 97 %   Position Semi-Mejía's   Vent Alarm Settings   High Pressure  50 cmH2O   Low Minute Volume Alarm 3 L/min   RR High 30 br/min

## 2022-05-20 NOTE — PROGRESS NOTES
CRRT restarted after vas cath was switched, CXR confirmed placement, and new  CRRT machine was obtained due to alarm alert that could not be corrected.

## 2022-05-20 NOTE — PROGRESS NOTES
Argatroban:  APTT = 91.3 secs (5/19/22 2128). Goal 60-90 seconds  Continue to hold Argatroban. Will recheck APTT at 0200 on 5/20/22. Argatroban is to be stopped at 0200 on 5/20/22 for vascath per previous orders.

## 2022-05-20 NOTE — PROGRESS NOTES
Comprehensive Nutrition Assessment    Type and Reason for Visit:  Reassess    Nutrition Recommendations/Plan:   1. Modified TF order to ADULT TUBE FEEDING; PEG tube; Renal formula - Nepro with a goal rate of 45 ml/hr x 20 hours. Start with 35 ml/hr and increase by 10 ml every 4 hours, as tolerated by patient, until goal rate can be achieved and maintained. Water flushes, 30 ml every 4 hours for tube patency. Please administer one proteinex P2Go TWICE daily via feeding tube. 2. Monitor TF rate, intake, and tolerance + water flushes + administration of one proteinex P2Go TWICE daily via feeding tube. 3. Monitor respiratory status, CRRT status, and plan of care. 4. Monitor nutrition-related labs, ostomy output/function, and weight trends. Malnutrition Assessment:  Malnutrition Status:   At risk for malnutrition (05/20/22 1100)    Context:  Acute Illness     Findings of the 6 clinical characteristics of malnutrition:  Energy Intake:  No significant decrease in energy intake  Weight Loss:  Greater than 5% over 1 month (- 60# or 20.9% weight loss since 4/22/22)     Body Fat Loss:  Unable to assess (patient was receiving nursing care) Orbital   Muscle Mass Loss:   (patient was receiving nursing care) Temples (temporalis)  Fluid Accumulation:  No significant fluid accumulation Extremities (BLE + 1 pitting edema)   Strength:  Not Performed    Nutrition Assessment:    patient remains unchanged from a nutritional standpoint since last RD assessment; patient remains at risk for further compromise d/t need for EN as sole source of nutrition, altered nutrition-related labs, and nutrition losses via CRRT; will modify TF order to Nepro with goal rate of 45 ml/hr x 20 hours + 30 ml water flushes every 4 hours + one proteinex P2Go TWICE daily via feeding tube    Nutrition Related Findings:    patient is A & O x 4; he is back on CRRT at this time; last documented BM was on 5/19/22; TF is infusing at goal rate and patient is tolerating well; patient has 15 units lantus nightly and high-dose SSI ordered at this time for BS control Wound Type: Multiple,Pressure Injury,Surgical Incision,Unstageable,Full Thickness,Partial Thickness (full thickness, trauma - pre-tib; unstageable left lower leg; full thickness, surgical - mid back; partial thickness skin loss - sacrum/buttocks)       Current Nutrition Intake & Therapies:    Average Meal Intake: NPO  Average Supplements Intake: NPO  Current Tube Feeding (TF) Orders:  · Feeding Route: PEG  · Formula: Renal Formula  · Schedule: Continuous  · Feeding Regimen: Nepro with a goal rate of 45 ml/hr x 20 hours  · Additives/Modulars: Protein (one proteinex P2Go TWICE daily via feeding tube)  · Water Flushes: 30 ml water flushes every 4 hours for tube patency  · Current TF & Flush Orders Provides: Nepro at 35 ml/hr x 20 hours = 700 ml TV, 1260 kcals, 57 g protein, and 509 ml free water + 52 g protein and 208 kcals from one proteinex P2Go TWICE daily (109 g protein and 1468 kcals total) + 30 ml water flushes every 4 hours for tube patency  · Goal TF & Flush Orders Provides: Nepro with a goal rate of 45 ml/hr x 20 hours = 900 ml TV, 1620 kcals, 73 g protein, and 654 ml free water + 52 g protein and 208 kcals from one proteinex P2Go TWICE daily (125 g protein and 1828 kcals total) + 30 ml water flushes every 4 hours for tube patency      Anthropometric Measures:  Height: 6' 2\" (188 cm)  Ideal Body Weight (IBW): 190 lbs (86 kg)    Admission Body Weight: 288 lb 12.8 oz (131 kg) (obtained on 4/22/22; actual weight)  Current Body Weight: 228 lb 8 oz (103.6 kg) (obtained on 5/20/22; actual weight), 120.3 % IBW.  Weight Source: Bed Scale  Current BMI (kg/m2): 29.3  Usual Body Weight: 288 lb 12.8 oz (131 kg) (obtained on 4/22/22; actual weight)  % Weight Change (Calculated): -20.9  Weight Adjustment For: Paraplegia,Amputation  Total Adjusted Percentage (Calculated): 13.4  Adjusted Ideal Body Weight (lbs) (Calculated): 164.5 lbs  Adjusted Ideal Body Weight (kg) (Calculated): 74.77 kg  Adjusted % Ideal Body Weight (Calculated): 138.9  Adjusted BMI (kg/m2) (Calculated): 33.2  BMI Categories: Obese Class 1 (BMI 30.0-34. 9)    Estimated Daily Nutrient Needs:  Energy Requirements Based On: Kcal/kg  Weight Used for Energy Requirements: Current  Energy (kcal/day): 1560 - 1872 kcals based on 15-18 kcals/kg/CBW  Weight Used for Protein Requirements: Adjusted (adjusted IBW)  Protein (g/day): 135 - 150 kcals based on 1.8-2.0 g/kg/adjusted IBW (+ CRRT)  Method Used for Fluid Requirements: 1 ml/kcal  Fluid (ml/day): 1560 - 1872 ml    Nutrition Diagnosis:   · Inadequate oral intake related to inadequate protein-energy intake,impaired respiratory function,renal dysfunction,increase demand for energy/nutrients as evidenced by NPO or clear liquid status due to medical condition,intubation,nutrition support - enteral nutrition,wounds,weight loss,lab values,dialysis      Nutrition Interventions:   Food and/or Nutrient Delivery: Continue NPO,Modify Tube Feeding  Nutrition Education/Counseling: No recommendation at this time  Coordination of Nutrition Care: Continue to monitor while inpatient,Interdisciplinary Rounds,Coordination of Care  Plan of Care discussed with: ICU Team    Goals:  Previous Goal Met: Goal(s) Achieved  Goals:  Tolerate nutrition support at goal rate,prior to discharge       Nutrition Monitoring and Evaluation:   Behavioral-Environmental Outcomes: None Identified  Food/Nutrient Intake Outcomes: Enteral Nutrition Intake/Tolerance,IVF Intake  Physical Signs/Symptoms Outcomes: Biochemical Data,Fluid Status or Edema,Hemodynamic Status,Nutrition Focused Physical Findings,Skin,Weight    Discharge Planning:    Enteral Nutrition     Miryam Mejia, [address],   Contact: [phone]

## 2022-05-20 NOTE — PROCEDURES
Central Venous Catheter Insertion Procedure Note     Procedure: Insertion of Central Venous Catheter     Indications: Need for hemodialysis vascular access     Anesthesia: Local skin infiltration with lidocaine     Procedure Details     Under sterile conditions the skin above the right IJ vein was prepped and covered with a sterile drape. Local anesthesia was applied to the skin and subcutaneous tissues surrounding the right TDC. We then used blunt dissection to free the cuff of the [facility] then the venotomy was exposed using a scalpel and blunt dissection and the [facility] was removed completely intact. A J-wire was placed into the right IJ . Afterwards, a 20 cm temporary HD catheter was inserted into the vein. The catheter had good flow when tested using a 10 ml syringe. The catheter was sutured into place. Findings: There were no changes to vital signs. Patient tolerated procedure well. EBL < 10 mL      Recommendations:   CXR ordered to verify placement.

## 2022-05-20 NOTE — PROGRESS NOTES
Pt rhythm change, Dr Noreen Baird at bedside EKG ordered and completed.  Once EKG was completed, Dr Noreen Baird ordered Dig at this time, med given

## 2022-05-21 NOTE — PROGRESS NOTES
Critical Care Medicine ICU Progress Note    CC: Septic shock, respiratory failure    Events of Last 24 hours:     S/p Trach   Doing good with PSV 12/5 ,at night AC  Still on Levophed 7 mcg/min   Pff Vasopressin 0.03 unit/minute  CRRTon hold    PEEP5   FiO2 30  %  HIT score 4 and HIT +   restart elquis 5 mg bid'  Feeding at goal ,had BM   Old skin lesion/sores ,no acute issues,wound care           Invasive Lines:   Vas cath changed and tunneled removed    ,trilysis catheter   Sub clavian port     MV: 4/22- 5/6/2022. Reintubated 5/11 for inability to clear secretions and hypercapnia  Vent Mode: (S) AC/VC Resp Rate (Set): 16 bmp/Vt (Set, mL): 490 mL/ /FiO2 : 30 %  Recent Labs     05/19/22  0519   PHART 7.377   IGG7TPW 37.1   PO2ART 113.9*       IV:   norepinephrine 7 mcg/min (05/21/22 0440)    vasopressin (Septic Shock) infusion Stopped (05/21/22 0115)    dextrose      prismaSATE BGK 4/2.5 500 mL/hr at 05/21/22 0310    prismaSATE BGK 4/2.5 500 mL/hr at 05/21/22 0310    prismaSATE BGK 4/2.5 500 mL/hr at 05/21/22 0310    sodium chloride 5 mL/hr at 05/19/22 1900       Intake/Output Summary (Last 24 hours) at 5/21/2022 0836  Last data filed at 5/21/2022 0700  Gross per 24 hour   Intake 1867 ml   Output 1697 ml   Net 170 ml     /87   Pulse 96   Temp 97.5 °F (36.4 °C) (Oral)   Resp 22   Ht 6' 2\" (1.88 m)   Wt 224 lb (101.6 kg)   SpO2 96%   BMI 28.76 kg/m²   16/490/8  General: ill appearing. Intubated sedated. Eyes: PERRL. No sclera icterus. No conjunctival injection. ENT: No discharge. Pharynx clear. ET tube in place  Neck: Trachea midline. Normal thyroid. Resp: No accessory muscle use. Few crackles. No wheezing. Few rhonchi. CV: Regular rate. Regular rhythm. No mumur or rub. No edema. GI: Non-tender. Non-distended. No masses. Skin: Warm and dry. No nodule on exposed extremities. No rash on exposed extremities. Lymph: No cervical LAD.  No supraclavicular LAD.   M/S: No cyanosis. No joint deformity. No clubbing. Neuro: Intubated. Alert and awake. Following commands.  Profound weakness with very poor ineffective cough   Psych: Noncommunicative unable to obtain      Scheduled Meds:   apixaban  5 mg Oral BID    ipratropium-albuterol  1 ampule Inhalation Q4H    midodrine  15 mg Oral TID WC    sodium chloride (Inhalant)  4 mL Nebulization Q12H    insulin glargine  15 Units SubCUTAneous Nightly    sodium chloride  500 mL IntraVENous Once    colistimethate (COLY-MYCIN) nebulization  150 mg Nebulization Q8H    collagenase   Topical Daily    sodium chloride  1,000 mL IntraVENous Once    epoetin bebo-epbx  3,000 Units SubCUTAneous Once per day on Mon Wed Fri    sennosides-docusate sodium  2 tablet Oral BID    insulin lispro  0-18 Units SubCUTAneous Q4H    pantoprazole  40 mg IntraVENous Daily    menthol-zinc oxide   Topical Daily    aspirin  81 mg Oral Nightly    sodium chloride flush  5-40 mL IntraVENous 2 times per day     PRN Meds:  oxyCODONE-acetaminophen, sodium phosphate IVPB **OR** sodium phosphate IVPB **OR** sodium phosphate IVPB **OR** sodium phosphate IVPB, traZODone, ipratropium-albuterol, dextrose bolus **OR** dextrose bolus, glucagon (rDNA), dextrose, potassium chloride, magnesium sulfate, calcium gluconate **OR** calcium gluconate **OR** calcium gluconate **OR** calcium gluconate, glucose, midazolam, sodium chloride flush, sodium chloride, ondansetron **OR** ondansetron, polyethylene glycol, acetaminophen **OR** acetaminophen    Results:  CBC:   Recent Labs     05/19/22  0519 05/20/22  0220 05/21/22  0540   WBC 14.8* 15.2* 20.5*   HGB 8.9* 8.5* 8.6*   HCT 28.7* 28.3* 27.8*   MCV 87.1 88.8 89.0   * 169 157     BMP:   Recent Labs     05/20/22  1820 05/20/22  2301 05/21/22  0530   * 129* 127*   K 3.7 3.9 3.9   CL 98* 97* 95*   CO2 24 25 25   PHOS 2.8 2.2* 2.4*   BUN 21* 19 18   CREATININE 0.7* 0.7* 0.6*     LIVER PROFILE:   No results for input(s): AST, ALT, LIPASE, BILIDIR, BILITOT, ALKPHOS in the last 72 hours. Invalid input(s): AMYLASE,  ALB     Cultures:      4/21/2022 blood 202 Enterococcus faecalis (sensitive to ampicillin, gentamicin, vancomycin) and Streptococcus pyogenes  4/21/2022 SARS-CoV-2 and influenza are negative  4/21/2022 urine Enterococcus and E. Coli  4/30 trach aspirate: acinetobacter  5/1/22 Bronch bal: Acinetobacter  5/12 BAL Acinetobacter baumannii     CTPA 4/21/2022  Impression   1. No evidence of acute pulmonary embolism. [place] is some thickening and   mild irregularity in the pulmonary arteries in the left lower lobe which may   represent chronic pulmonary embolism or secondary appearance to inflammation. No evidence of aortic aneurysm or dissection. 2. Moderate right effusion and right lower lobe atelectatic and/or   consolidative changes.  Pneumonia is likely. [place] is severe loss of volume   in the right lung.  Trace left effusion and left lower lobe atelectatic   changes are seen. 3. Moderate ascites around the spleen and liver. ACT 4/21/22  Impression   1. Moderate amount of ascites with 3rd spacing including edematous changes   throughout the abdomen and anasarca. 2. Delgado in place.  Diffuse bladder wall thickening.  Correlate for   underlying cystitis. 3. No acute bowel pathology.  Mild gastric distension.  Diverticulosis with   no acute features. 4. Mild renal cortical scarring and asymmetric right renal atrophy, stable. No hydronephrosis.      Chest x-ray 5/15 imaging reviewed by me and showed  Satisfactory ETT position- 26 cm   Bibasilar ASD with RLL collapse           ASSESSMENT:  · Acute on chronic hypoxemic respiratory failure  · VAP/HCAP  · RLL collapse, mucus plug, pulmonary congestion with poor ineffective cough- today with high PAP  · Septic shock  · Bacteremia: Enterococcus faecalis and Streptococcus  · Dysphagia- FEES yesterday with secretion above VC  · Ischemic cardiomyopathy EF 25 to 30%  · PVCs with NSVT   · Right lower extremity cellulitis, H/O R BKA  · Chronic T-spine osteomyelitis is managed by Dr. Tiajuana Angelucci  · Chronic decubitus ulcers  · End-stage kidney disease on HD  · LIBORIO  · H/O DVT on home Eliquis  · Paraplegia 2/2 [facility] 2013    PLAN:  Watch wbc ,no signs of infection ,no fever and on abx ,ID following ,watch carefully   New  HD cathter   On  ELQUIS   Right side effusion ,will try drain at some point when more stable   Start argatroban   Need CXR ,R side effusion   Acinetobacter baumannii pneumonia as per ID ,  Had trach as  he fail extubation multiple times ,some secrtions   Weaning pressors ,CRRT on hold as well and on midodrine   ID following   Multiple bronch ,will see CXR   PSV now    On elquis ,HIT + anjali   Pulmonary toielt 3 % saline  metanep  On tube feeding ,have BM   Had EGD ,was ok  Discuss with family in details   Negative 8 L   Wean from MV   Goal to waen pressors and I am ok with MAP 60 as long as he is fully awake   Discussed with family in details      Care reviewed with nursing staff, medical and surgical specialty care, primary care and the patient's family as available. Chart review/lab review/X-ray viewing/documentation and had long Conversation with patient/family re: prognosis, care options and any end of life issues:      Critical care time spent reviewing labs/films, examining patient, collaborating with other physicians more than 35  Minutes  excluding procedures . Nighat Ha M.D.   5/21/2022  8:36 AM

## 2022-05-21 NOTE — PROGRESS NOTES
Hospitalist ICU Progress Note      PCP: Marilyn Rodriguez MD    Date of Admission: 4/21/2022    Subjective:     49 y.o. male with hx of ischemic CM, paraplegia, IDDM, ESRD on HD presents with bacteremia and decubitus ulcers, septic shock . off sammi, dobutamine->  remains on levophed, vasopressin  Ongoing CRRT with fluid removal     5/11  Patient was on Airvo and BiPAP-> worsening hypoxemia-> intubated.  -> Continues to require pressors  -> On CRRT     5/15 s/p bronchoscopy with aspiration of mucus plugs . 5/16-s/p tracheostomy and PEG tube placement. 5/21  -Patient on assist control at night, tolerating pressure support ventilation during the day. FiO2 30% PEEP of 5. He is more awake and responsive, well-oriented  Continued on CRRT. Remains on pressors Levophed .       Medications:  Reviewed    Infusion Medications    norepinephrine 5 mcg/min (05/21/22 1411)    vasopressin (Septic Shock) infusion Stopped (05/21/22 0115)    dextrose      prismaSATE BGK 4/2.5 500 mL/hr at 05/21/22 1315    prismaSATE BGK 4/2.5 500 mL/hr at 05/21/22 1315    prismaSATE BGK 4/2.5 500 mL/hr at 05/21/22 1315    sodium chloride 5 mL/hr at 05/19/22 1900     Scheduled Medications    apixaban  5 mg Oral BID    ipratropium-albuterol  1 ampule Inhalation Q4H    midodrine  15 mg Oral TID WC    sodium chloride (Inhalant)  4 mL Nebulization Q12H    insulin glargine  15 Units SubCUTAneous Nightly    sodium chloride  500 mL IntraVENous Once    colistimethate (COLY-MYCIN) nebulization  150 mg Nebulization Q8H    collagenase   Topical Daily    sodium chloride  1,000 mL IntraVENous Once    epoetin bebo-epbx  3,000 Units SubCUTAneous Once per day on Mon Wed Fri    sennosides-docusate sodium  2 tablet Oral BID    insulin lispro  0-18 Units SubCUTAneous Q4H    pantoprazole  40 mg IntraVENous Daily    menthol-zinc oxide   Topical Daily    aspirin  81 mg Oral Nightly    sodium chloride flush  5-40 mL IntraVENous 2 times per day     PRN Meds: oxyCODONE-acetaminophen, sodium phosphate IVPB **OR** sodium phosphate IVPB **OR** sodium phosphate IVPB **OR** sodium phosphate IVPB, traZODone, ipratropium-albuterol, dextrose bolus **OR** dextrose bolus, glucagon (rDNA), dextrose, potassium chloride, magnesium sulfate, calcium gluconate **OR** calcium gluconate **OR** calcium gluconate **OR** calcium gluconate, glucose, midazolam, sodium chloride flush, sodium chloride, ondansetron **OR** ondansetron, polyethylene glycol, acetaminophen **OR** acetaminophen      Intake/Output Summary (Last 24 hours) at 5/21/2022 1451  Last data filed at 5/21/2022 1400  Gross per 24 hour   Intake 2020 ml   Output 2026 ml   Net -6 ml       Physical Exam Performed:    /80   Pulse 93   Temp 97.7 °F (36.5 °C) (Oral)   Resp 18   Ht 6' 2\" (1.88 m)   Wt 224 lb (101.6 kg)   SpO2 96%   BMI 28.76 kg/m²       General appearance:   on mechanical ventilation. Awake and responding. Trach to vent    Chronically ill-appearing  O2 sats stable at FiO2 30%  Tracheostomy in place  Ongoing CRRT  HEENT: NAD, pupils reactive to light  Neck: Supple,  No jugular venous distention. Tracheostomy present. Respiratory: Diminished breath sounds, no rhonchi   Cardiovascular: Regular rate and rhythm  Right chest HD catheter and Port noted  Abdomen: Soft, non-tender, non distended with normal bowel sounds. Colostomy noted in left LQ/ PEG present  Musculoskeletal: Right BKA with stump healthy.  Superficial skin ulcers on right thigh with no active infection noted  Left LE edema with superficial ulceration noted   Skin: see Epic wound pictures, chronic exposed hardware in lower thoracic and lumbar region             Neurologic: Awake, responsive,  paraplegic , atrophy of forearm and hand muscles   Fully awake  alert oriented    Labs:   Recent Labs     05/19/22  0519 05/20/22  0220 05/21/22  0540   WBC 14.8* 15.2* 20.5*   HGB 8.9* 8.5* 8.6*   HCT 28.7* 28.3* 27.8*   * 169 157     Recent Labs     05/20/22  2301 05/21/22  0530 05/21/22  1126   * 127* 129*   K 3.9 3.9 4.0   CL 97* 95* 96*   CO2 25 25 25   BUN 19 18 16   CREATININE 0.7* 0.6* 0.6*   CALCIUM 7.9* 8.3 8.1*   PHOS 2.2* 2.4* 2.5     No results for input(s): AST, ALT, BILIDIR, BILITOT, ALKPHOS in the last 72 hours. Recent Labs     05/19/22  0717 05/20/22  0220   INR 2.51* 1.89*     No results for input(s): CKTOTAL, TROPONINI in the last 72 hours. Urinalysis:      Lab Results   Component Value Date    NITRU Negative 04/21/2022    WBCUA 21-50 04/21/2022    BACTERIA 4+ 04/21/2022    RBCUA  04/21/2022    BLOODU LARGE 04/21/2022    SPECGRAV >=1.030 04/21/2022    GLUCOSEU Negative 04/21/2022    GLUCOSEU >=1000 mg/dL 08/31/2010     Cultures  Blood - 4/21 - Group A , strep pyogenes and Enterococcus Faecalis   Repeat blood - NG 4/23  Urine - Ecoli , Enterococcus Faecalis   Sputum- Acinetobacter baumannii      Radiology:  XR CHEST PORTABLE   Final Result   No significant change         XR CHEST PORTABLE   Preliminary Result   Right internal jugular catheter with tip in the region of the inferior SVC,   although obscured by the spinal hardware. No pneumothorax. Otherwise no significant change. VL Extremity Venous Bilateral   Final Result      XR CHEST PORTABLE   Final Result   Stable appearance of the chest with bilateral pleural effusions and   associated airspace change in the lower lung zones. XR CHEST PORTABLE   Final Result   Bilateral pleuroparenchymal disease, decreased on the left, but increased on   the right         US GUIDED PARACENTESIS   Final Result   Successful paracentesis. XR CHEST PORTABLE   Final Result   Endotracheal tube remains approximately 3 cm above the quintin. Poor inspiration with basilar airspace disease and possible effusion   primarily on the left.          XR CHEST PORTABLE   Final Result   Persisting left lower lobe airspace disease with volume loss or collapse. Mucous plugging considered. Improving aeration right lower lobe. XR CHEST PORTABLE   Final Result   Persistent pleural-parenchymal disease at the lung bases, similar to prior. XR CHEST PORTABLE   Final Result   Improving left lower lobe consolidation         XR CHEST PORTABLE   Final Result   1. Unchanged position of support devices. 2. Vascular congestion, basilar opacities and small pleural effusions are   again demonstrated and without significant change. CT ABDOMEN PELVIS W IV CONTRAST Additional Contrast? None   Final Result   Increasing opacifications of the lung bases with small to moderate pleural   effusions right greater than left and adjacent opacifications which appear   greatest in the left lower lobe of worsening airspace disease and/or   atelectasis from prior comparison 04/21/2022      Similar appearance of small to moderate volume abdominopelvic ascites without   loculated or heterogeneous fluid collection otherwise evident greatest in the   perihepatic and perisplenic regions         XR CHEST PORTABLE   Final Result   1. Interval placement of endotracheal and orogastric tubes, in proper   positions. 2. Other support apparatus is stable and unchanged. 3. Stable small bilateral pleural effusions. 4. Progressive multifocal airspace opacities within the bilateral mid and   lower lung zones, likely a combination of progressive pulmonary edema and   multifocal pneumonia. XR CHEST PORTABLE   Final Result   Hypoinflated lungs with residual basilar opacities likely representing   atelectasis. The technique and hypoinflation will exaggerate the pulmonary vasculature. IR MIDLINE CATH   Final Result      XR CHEST PORTABLE   Final Result   Stable supportive devices. Increasing vascular congestion/mild pulmonary   edema. XR CHEST PORTABLE   Final Result   Low lung volumes with bibasilar atelectasis. Otherwise no acute process. XR CHEST PORTABLE   Final Result   Supportive tubing is in normal position. Stable left basilar opacity, atelectasis versus airspace disease. XR CHEST PORTABLE   Final Result   Slight improved aeration of the right lung. Bilateral pleuroparenchymal   disease remains         XR CHEST PORTABLE   Final Result   No significant change compared to chest x-ray from 05/01/2022. XR CHEST PORTABLE   Final Result   Low lung volumes with patchy airspace disease which may represent multifocal   atelectasis. Overall stable appearing chest.  Possible trace effusions. XR CHEST PORTABLE   Final Result   Relatively stable appearance of the chest with bibasilar airspace opacities. XR CHEST PORTABLE   Final Result   Low volume study with bilateral atelectasis or airspace disease, similar to   prior, with persistent small pleural effusions. XR CHEST PORTABLE   Final Result   Stable chest.         XR CHEST PORTABLE   Final Result   Endotracheal tube tip projects at the mid intrathoracic trachea. Otherwise   stable chest.         XR CHEST PORTABLE   Final Result   Suboptimal examination due to patient rotation. The chest appears stable. XR CHEST PORTABLE   Final Result   Endotracheal tube tip projects at the thoracic inlet. Otherwise stable chest.         XR CHEST PORTABLE   Final Result   Stable chest.         XR CHEST PORTABLE   Final Result   Stable endotracheal tube located approximately 3.3 cm above the quintin. Low lung volumes. Suspected small right pleural effusion. XR CHEST PORTABLE   Final Result   Endotracheal tube in satisfactory position above the quintin      Bibasilar hypoaeration persist         CT ABDOMEN PELVIS W IV CONTRAST Additional Contrast? None   Final Result   1. Moderate amount of ascites with 3rd spacing including edematous changes   throughout the abdomen and anasarca. 2. Delgado in place. Diffuse bladder wall thickening.   Correlate for   underlying cystitis. 3. No acute bowel pathology. Mild gastric distension. Diverticulosis with   no acute features. 4. Mild renal cortical scarring and asymmetric right renal atrophy, stable. No hydronephrosis. CT CHEST PULMONARY EMBOLISM W CONTRAST   Final Result   1. No evidence of acute pulmonary embolism. There is some thickening and   mild irregularity in the pulmonary arteries in the left lower lobe which may   represent chronic pulmonary embolism or secondary appearance to inflammation. No evidence of aortic aneurysm or dissection. 2. Moderate right effusion and right lower lobe atelectatic and/or   consolidative changes. Pneumonia is likely. There is severe loss of volume   in the right lung. Trace left effusion and left lower lobe atelectatic   changes are seen. 3. Moderate ascites around the spleen and liver. XR CHEST PORTABLE   Final Result   Low lung volumes. Bilateral pleural effusions with bibasilar volume loss,   right greater than left. No overt failure. Assessment/Plan:    Septic shock. Enterococcus faecalis and strep pyogenes bacteremia. Right lower extremity cellulitis  Chronic pressure ulcers on the back with exposed hardware   Source could be HD line vs exposed hardware  Patient was on vancomycin, Merrem and clindamycin.  changed to  fortaz , tygacil and steffany nebs for acinetobacter. Completed 14 days of IV Vanco. ID managing this. Currently with sputum cultures positive for Acinetobacter . Antibiotics switched to  inhaled Coly-Mycin and IV Tygacil . ID following and currently continued on inhaled Coly-Mycin. Severe hypotensive shock  needing sammi, vaso, dobutamine and levophed    on hydrocortisone for shock-->weaned off   Critical care managing  -Persistent hypotension , remains on pressors-  Levophed; off  vasopressin. Has been difficult to wean offof pressors . Try to wean as tolerated    End-stage renal disease on hemodialysis. Nephrology consulted  Ongoing CRRT- fluid removal as tolerated  -Electrolytes being monitored and adjusted with CRRT  -Nephrology following, Vas-Cath was exchanged. Acute hypoxic resp failure  On mechanical ventilation. 4/22-5/6; reintubated on 5/11/2022  Healthcare associated pneumonia. Respiratory culture shows Acinetobacter baumannii  -Intubated in the ICU on 4/22-> extubated 5/6. On Airvo and BiPAP-> reintubated on 5/11/2022.   -Antibiotics as above. Patient followed by pulmonologist and ID  -S/p bronch 5/1/22 given worsening leucocytosis/fever  -cx with acinetobacter - was on IV ceftaz,tygacil and steffany nebs; then switched to IV Tygacil, and on inhaled Coly-Mycin. Currently on inh Coly-Mycin  -> Reintubated on 5/11/22 for worsening hypoxic hypercapnic respiratory failure  -> S/P bronch 5/15    --> S/p tracheostomy and PEG placement  on 5/16/2022. Continue vent weaning; currently on assist control at night and pressure support during the day.       Chronic systolic congestive heart failure, EF 25 to 30%. Ischemic cardiomyopathy  CAD s/p previous stents   Tachyarrhythmia/ NSVT  -Was seen by cardiology earlier in the admission , not following now . -  home medications on hold given hypotension  -Off dobutamine   -Resume ASA , statins  -Recurrent tachycardia  On 5/20-> prn dose of  IV digoxin given. -Monitor and replace electrolytes     Acute metabolic encephalopathy resolved. -Secondary to septic shock  resolved     Type 2 diabetes.  -Lantus insulin and sliding scale insulin     History of DVT  Heparin-induced thrombocytopenia  -Stopped heparin and started on argatroban gtt->  switched to Eliquis now     Chronic wounds to low back, thoracic spine, ischium  DTI noted.   -[address] [facility] by Dr. Carter Smith    Paraplegia  Neurogenic bladder   -bed bound ,supportive care  - intermittent self catherizations at home.   - had langley-> dced   Continue intermittent straight cath    Ascites  -paracentesis done and 3 L of fluid was removed. No subacute bacterial peritonitis.          Diet: Diet NPO  ADULT TUBE FEEDING; PEG; Renal Formula; Continuous; 35; Yes; 10; Q 4 hours; 45; 30; Q 4 hours; Protein; one proteinex P2Go TWICE daily via feeding tube  Code Status: Full Code  Dispo - cont care    Discussed with ICU RN      Yesenia Melendez MD 5/21/2022 2:51 PM

## 2022-05-21 NOTE — PROGRESS NOTES
Reassessment completed, no changes noted at this time. Family at bedside. CRRT running without concerns. Weaning levophed down as pt tolerates. Call light in reach Monitoring closely.

## 2022-05-21 NOTE — PROGRESS NOTES
Blood pressure 94/66, pulse 107, temperature 97.7 °F (36.5 °C), temperature source Oral, resp. rate 20, height 6' 2\" (1.88 m), weight 228 lb 8 oz (103.6 kg), SpO2 95 %. Patient continues with Trach to Vent with O2 per order. Vent settings are 16/490/+30%/5. Patient is tolerating vent well at this time. Patient is clearing throat and spitting thick clear sputum. Lung sounds diminished. Abdomen soft non tender, BS active x 4 quads. G-Tube patient with Tube feed, Nepro running at goal rate of 35 ml/H and 30 ml H2O flushes every four hours. Generalized edema noted to BUE, and RBLE. Patient is complaining of pain in his back with request of PRN PCT. Medication given will continue to monitor for effectiveness. CRRT running okay at this time. Flow parameters are: , DIALYSATE 500, POST 500. Fluid removal goal is EVEN per nephrology. Patient tolerating well at this time. LEVOPHED infusing at a rate of 7 mcg/min. VASOPRESSIN infusing at 0.03 units/min    Patient is Alert, watching TV. Call light in reach.    Electronically signed by Dean Isaac RN on 5/20/2022 at 8:32 PM

## 2022-05-21 NOTE — PROGRESS NOTES
05/21/22 0301   NICU Vent Information   $Ventilation $Subsequent Day   Vent Type 980   Vent Mode AC/VC   Vt (Set, mL) 490 mL   Vt Exhaled 427 mL   Resp Rate (Set) 16 bmp   Rate Measured 17 br/min   Minute Volume 7.07 Liters   Peak Flow 50 L/min   Pressure Support 0 cmH20   FiO2  30 %   Peak Inspiratory Pressure 18 cmH2O   I:E Ratio 1:2.50   Sensitivity 2   High Peep/I Pressure 0   PEEP/CPAP (cmH2O) 5   I Time/ I Time % 0 s   Mean Airway Pressure 10 cmH20   Plateau Pressure 22 [id number]   Breath Sounds   Right Upper Lobe Diminished   Right Middle Lobe Diminished   Right Lower Lobe Diminished   Left Upper Lobe Diminished   Left Lower Lobe Diminished   Additional Respiratory Assessments   Pulse 100   Resp 24   SpO2 97 %   Position Semi-Mejía's   Humidification Source Heated wire   Humidification Temp 37   Circuit Condensation Drained   Vent Alarm Settings   High Pressure  50 cmH2O   Low Minute Volume Alarm 3 L/min   Ve Max 20   Ve Min 3   Vt Low  300 mL   Vt High  1000 mL   RR High 30 br/min   Apnea (Secs) 20 secs   Ambu Bag With Mask At Bedside Yes   Surgical Airway (Trach) 05/16/22 Shiley Cuffed   Placement Date/Time: 05/16/22 0946   Present on Admission/Arrival: No  Placed By: In surgery  Surgical Airway Type: Tracheostomy  Brand: Dao  Style: (c) Cuffed  Size (mm): 6   Status Secured   Site Assessment Clean;Dry   Ties Assessment Clean;Dry; Intact

## 2022-05-21 NOTE — PROGRESS NOTES
AM assessment completed per flowsheet. Pt mechanically ventilated via trach. Tolerating vent setting. NSR on monitor with occ PVC. Abd round soft and nontender, tube feeding at goal. Generalized edema noted. Pt repositioned. VSS. Monitoring closely.

## 2022-05-21 NOTE — PROGRESS NOTES
RT Inhaler-Nebulizer Bronchodilator Protocol Note    There is a bronchodilator order in the chart from a provider indicating to follow the RT Bronchodilator Protocol and there is an Initiate RT Inhaler-Nebulizer Bronchodilator Protocol order as well (see protocol at bottom of note). CXR Findings:  XR CHEST PORTABLE    Result Date: 5/21/2022  No significant change     XR CHEST PORTABLE    Result Date: 5/20/2022  Right internal jugular catheter with tip in the region of the inferior SVC, although obscured by the spinal hardware. No pneumothorax. Otherwise no significant change. The findings from the last RT Protocol Assessment were as follows:   History Pulmonary Disease: Chronic pulmonary disease  Respiratory Pattern: Regular pattern and RR 12-20 bpm  Breath Sounds: Slightly diminished and/or crackles  Cough: Weak, productive  Indication for Bronchodilator Therapy: Decreased or absent breath sounds  Bronchodilator Assessment Score: 6    Aerosolized bronchodilator medication orders have been revised according to the RT Inhaler-Nebulizer Bronchodilator Protocol below. Respiratory Therapist to perform RT Therapy Protocol Assessment initially then follow the protocol. Repeat RT Therapy Protocol Assessment PRN for score 0-3 or on second treatment, BID, and PRN for scores above 3. No Indications - adjust the frequency to every 6 hours PRN wheezing or bronchospasm, if no treatments needed after 48 hours then discontinue using Per Protocol order mode. If indication present, adjust the RT bronchodilator orders based on the Bronchodilator Assessment Score as indicated below.   Use Inhaler orders unless patient has one or more of the following: on home nebulizer, not able to hold breath for 10 seconds, is not alert and oriented, cannot activate and use MDI correctly, or respiratory rate 25 breaths per minute or more, then use the equivalent nebulizer order(s) with same Frequency and PRN reasons based on the score. If a patient is on this medication at home then do not decrease Frequency below that used at home. 0-3 - enter or revise RT bronchodilator order(s) to equivalent RT Bronchodilator order with Frequency of every 4 hours PRN for wheezing or increased work of breathing using Per Protocol order mode. 4-6 - enter or revise RT Bronchodilator order(s) to two equivalent RT bronchodilator orders with one order with BID Frequency and one order with Frequency of every 4 hours PRN wheezing or increased work of breathing using Per Protocol order mode. 7-10 - enter or revise RT Bronchodilator order(s) to two equivalent RT bronchodilator orders with one order with TID Frequency and one order with Frequency of every 4 hours PRN wheezing or increased work of breathing using Per Protocol order mode. 11-13 - enter or revise RT Bronchodilator order(s) to one equivalent RT bronchodilator order with QID Frequency and an Albuterol order with Frequency of every 4 hours PRN wheezing or increased work of breathing using Per Protocol order mode. Greater than 13 - enter or revise RT Bronchodilator order(s) to one equivalent RT bronchodilator order with every 4 hours Frequency and an Albuterol order with Frequency of every 2 hours PRN wheezing or increased work of breathing using Per Protocol order mode.        Electronically signed by Dean Cota RCP on 5/21/2022 at 7:26 PM

## 2022-05-21 NOTE — PROGRESS NOTES
Inpatient Physical Therapy Re- Evaluation and Treatment    Unit: ICU  Date:  5/21/2022  Patient Name:    Torrie Riedel  Admitting diagnosis:  Hyperkalemia [E87.5]  Hypoglycemia [E16.2]  ESRD on dialysis [facility]) [N18.6, Z99.2]  Acute cystitis with hematuria [N30.01]  Septic shock ([facility] [address] [A41.9, R65.21]  Sepsis ([facility] [address] [A41.9]  Pneumonia due to infectious organism, unspecified laterality, unspecified part of lung [J18.9]  Admit Date:  4/21/2022  Precautions/Restrictions/WB Status/ Lines/ Wounds/ Oxygen: fall risk, IV, bed/chair alarm, langley catheter, telemetry, continuous pulse ox, ICU monitoring, intubated, isolation precautions (Contact), code status (Full) and CRRT, Paraplegic,Colostomy, R BKA    Treatment Time:  16:25-16:45  Treatment Number:  1   Timed Code Treatment Minutes: 10 minutes  Total Treatment Minutes:  20  minutes    Patient Goals for Therapy:  Non stated         Discharge Recommendations: Plans are for [facility], [facility]   DME needs for discharge: defer to facility       Therapy recommendation for EMS Transport: requires transport by cot due to inability to transfer without lift equiptment    Therapy recommendations for staff:   Ax2 for bed mobility  PROM BLE    History of Present Illness: ED note, 4/21/2022, [place] President:   \" 49 y. o. male with a past medical history of CAD, history of DVT, chronic kidney disease on dialysis Tuesday Thursday Saturday, COPD, hyperlipidemia, history of blood clots, sleep apnea, history of TIA, history of CVA, quadriplegic, CHF brought in today by EMS from dialysis for concern for hypotension.  Patient states he did not complete dialysis today given his hypotension.  Onset of symptoms occurred just prior to arrival to the ED.  Duration of symptoms have been persistent since onset.  Context includes concern for hypotension.  He denies chest pain or shortness of breath.  Denies any documented fevers chills nausea vomiting diarrhea.  Denies abdominal pain.  No aggravating symptoms.  No alleviating symptoms.  Otherwise denies any other complaints.  Nothing seems to make symptoms better or worse. \"  Intubated 4/22/2022 - extubated 5/6/2022  Reintubated 5/11 for inability to clear secretions and hypercapnia  Interval hx per Dr. Latasha Awan 5/20:  \"50 y.o. male with hx of ischemic CM, paraplegia, IDDM, ESRD on HD presents with bacteremia and decubitus ulcers, septic shock . off sammi, dobutamine->  remains on levophed, vasopressin  Ongoing CRRT with fluid removal      5/11  Patient was on Airvo and BiPAP-> worsening hypoxemia-> intubated.  -> Continues to require pressors  -> On CRRT      5/15 s/p bronchoscopy with aspiration of mucus plugs .     5/16-s/p tracheostomy and PEG tube placement.      5/20   Patient remains on mechanical ventilation, tracheostomy in place. FiO2 30% and PEEP of 5. He is awake and responding. Continued on CRRT, hemodialysis cath was exchanged. Remains on pressors Levophed and vasopressin. Tele-> tachyarrhythmia now. He has ischemic cardiomyopathy, nonsustained V. tach has been documented earlier in the admission and was seen by cardiology. Has HIT, on argatroban drip-> plan to switch to Eliquis today. \"    Patient was re-referred to PT on this date(5/21/22)  Home Health S4 Level Recommendation:  NA  AM-PAC Mobility Score    AM-PAC Inpatient Mobility Raw Score : 7  AM-PAC Inpatient Mobility without Stair Climbing Raw Score : 5    Information obtained from OT evaluation dated 5/6/22. Preadmission Environment    Pt. Larry Hartman family ([facility])  Home environment:    tri-level home  Steps to enter main floor:       ramp entry to main level where patient stays  Bathroom: does bed baths, uses colostomy and straight caths self independently  Equipment owned: Honey lift, hospital bed with specialized ATILIO mattress, custom w/c, SANDEE cushion     Preadmission Status:  Pt. Able to drive: No  Pt Fully independent with ADLs: No  Pt.  Required assistance from family for: Bathing, Cleaning, Cooking, Dressing and Laundry     Pt reports he does not receive any aide services at this time. Pt. dependent for transfers with two people using Honey Lift  History of falls No       Pain   Yes  Location: back  Rating: not rated,just received pain medication, became progressively sleepy throughout session      Cognition    A&O orientation not directly assessed. Alert upon entrance and able to answer questions. Sleeping at the end of session    Subjective  Patient lying supine in bed with family at bedside. Pt agreeable to this PT eval & tx. Upper Extremity ROM/Strength  Please see OT evaluation. Lower Extremity ROM / Strength   AROM WFL: No, patient has no active LE ROM due to paraplegia   Patient LLE in external rotation with DF. Patient demonstrated almost zero passive range in hip abd/add, hip and knee flexion. Able to DF L foot to neutral.    R BKA: also very limited ~ 5 degrees of hip flexion and abduction    Lower Extremity Sensation    Appears absent BLE's; patient sleepy throughout session      Coordination and Tone  Difficult to distinguish if limited ROM in BLE is due to tone , contracture or both      Bed Mobility   Supine to Sit:    N/A  Sit to Supine:   N/A  Rolling: Total A and 2 persons  Scooting in sitting: N/A  Scooting in supine: Total A      Activity Tolerance   Pt completed therapy session with No adverse symptoms noted w/activity    Positioning Needs   Patient remained in bed,sleeping    Exercises Initiated  all completed bilaterally unless indicated  PROM BLE's through all available range    Other  RN made aware of LE ROM in BLE's    Patient/Family Education   Pt educated on role of inpatient PT, POC, purpose of visit  Assessment  Pt seen for Physical Therapy evaluation in acute care setting. Pt demonstrated decreased Activity tolerance, ROM and Strength as well as decreased independence with Bed Mobility  and Transfers.    Patient demonstrated significantly
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
## 2022-05-21 NOTE — PROGRESS NOTES
Blood pressure (!) 111/96, pulse 92, temperature 97.5 °F (36.4 °C), temperature source Oral, resp. rate 17, height 6' 2\" (1.88 m), weight 224 lb (101.6 kg), SpO2 93 %. CRRT running okay at this time. Flow parameters are: , DIALYSATE 500, POST 500. Fluid removal goal is EVEN per nephrology. Patient tolerating well at this time. LEVOPHED infusing at a rate of 3 mcg/min. Patient continues on Trach to Vent with O2 per order. Vent settings are Spontanous/30%/5/. Patient is Alert and Oriented x 4. Patient is complaining of pain in his back. PRN pain medication already given and not available at this time. Repositioned patient for comfort. No coughing noted at this time. Scant amount of secretions noted from trach at this time. Lung sounds diminished. Abdomen soft, non-tender. BS + x 4 quads. Tube feed continues at goal rate of 35 ml/H with 30 ml/H H2O flushes every 4 hours. No residual noted at this time. Edema continues to LLE. Pillows being used to elevate extremities. Blood sugar monitored and treated per order. No sliding scale needed at this time. Will recheck in four hours. Patient resting, eyes closed, respirations witnessed as e/e. No signs of distress. Call light and bedside table in easy reach.    Electronically signed by Elsie Olivera RN on 5/21/2022 at 8:06 PM

## 2022-05-21 NOTE — PROGRESS NOTES
Blood pressure (!) 157/124, pulse 99, temperature 97.7 °F (36.5 °C), temperature source Oral, resp. rate 19, height 6' 2\" (1.88 m), weight 228 lb 8 oz (103.6 kg), SpO2 96 %. LEVOPHED titrated down and now off. Will continue to monitor BP. BP now 130/34. VASOPRESSIN infusing at 0.03 units/min    Patient complaining of pain. To early for PRN percocet at this time. Patient request PRN trazodone. Given at this time.     Electronically signed by Fred Lutz RN on 5/20/2022 at 11:28 PM

## 2022-05-21 NOTE — PROGRESS NOTES
05/20/22 2323   NICU Vent Information   Vent Type 980   Vent Mode AC/VC   Vt (Set, mL) 490 mL   Vt Exhaled 509 mL   Resp Rate (Set) 16 bmp   Rate Measured 19 br/min   Minute Volume 9.37 Liters   Peak Flow 50 L/min   Pressure Support 0 cmH20   FiO2  30 %   Peak Inspiratory Pressure 27 cmH2O   I:E Ratio 1:1.70   Sensitivity 2   High Peep/I Pressure 0   PEEP/CPAP (cmH2O) 5   I Time/ I Time % 0 s   Mean Airway Pressure 13 cmH20   Plateau Pressure 22 [id number]   Cough/Sputum   Sputum How Obtained Suctioned;Tracheal   Cough Non-productive   Sputum Amount None   Breath Sounds   Right Upper Lobe Diminished   Right Middle Lobe Diminished   Right Lower Lobe Diminished   Left Upper Lobe Diminished   Left Lower Lobe Diminished   Additional Respiratory Assessments   Pulse 103   Resp 19   Position Semi-Mejía's   Humidification Source Heated wire   Humidification Temp 37   Circuit Condensation Drained   Vent Alarm Settings   High Pressure  50 cmH2O   Low Minute Volume Alarm 3 L/min   Ve Max 20   Ve Min 3   Low Exhaled Vt  200 mL   Vt Low  300 mL   Vt High  1000 mL   RR High 30 br/min   Apnea (Secs) 20 secs   Ambu Bag With Mask At Bedside Yes   Surgical Airway (Trach) 05/16/22 Shiley Cuffed   Placement Date/Time: 05/16/22 0946   Present on Admission/Arrival: No  Placed By: In surgery  Surgical Airway Type: Tracheostomy  Brand: Dao  Style: (c) Cuffed  Size (mm): 6   Status Secured   Site Assessment Clean;Dry   Ties Assessment Clean;Dry; Intact

## 2022-05-21 NOTE — PROGRESS NOTES
RT Inhaler-Nebulizer Bronchodilator Protocol Note    There is a bronchodilator order in the chart from a provider indicating to follow the RT Bronchodilator Protocol and there is an Initiate RT Inhaler-Nebulizer Bronchodilator Protocol order as well (see protocol at bottom of note). CXR Findings:  XR CHEST PORTABLE    Result Date: 5/20/2022  Right internal jugular catheter with tip in the region of the inferior SVC, although obscured by the spinal hardware. No pneumothorax. Otherwise no significant change. The findings from the last RT Protocol Assessment were as follows:   History Pulmonary Disease: Chronic pulmonary disease  Respiratory Pattern: Regular pattern and RR 12-20 bpm  Breath Sounds: Slightly diminished and/or crackles  Cough: Weak, non-productive  Indication for Bronchodilator Therapy: On home bronchodilators  Bronchodilator Assessment Score: 7    Aerosolized bronchodilator medication orders have been revised according to the RT Inhaler-Nebulizer Bronchodilator Protocol below. Respiratory Therapist to perform RT Therapy Protocol Assessment initially then follow the protocol. Repeat RT Therapy Protocol Assessment PRN for score 0-3 or on second treatment, BID, and PRN for scores above 3. No Indications - adjust the frequency to every 6 hours PRN wheezing or bronchospasm, if no treatments needed after 48 hours then discontinue using Per Protocol order mode. If indication present, adjust the RT bronchodilator orders based on the Bronchodilator Assessment Score as indicated below. Use Inhaler orders unless patient has one or more of the following: on home nebulizer, not able to hold breath for 10 seconds, is not alert and oriented, cannot activate and use MDI correctly, or respiratory rate 25 breaths per minute or more, then use the equivalent nebulizer order(s) with same Frequency and PRN reasons based on the score.   If a patient is on this medication at home then do not decrease Frequency below that used at home. 0-3 - enter or revise RT bronchodilator order(s) to equivalent RT Bronchodilator order with Frequency of every 4 hours PRN for wheezing or increased work of breathing using Per Protocol order mode. 4-6 - enter or revise RT Bronchodilator order(s) to two equivalent RT bronchodilator orders with one order with BID Frequency and one order with Frequency of every 4 hours PRN wheezing or increased work of breathing using Per Protocol order mode. 7-10 - enter or revise RT Bronchodilator order(s) to two equivalent RT bronchodilator orders with one order with TID Frequency and one order with Frequency of every 4 hours PRN wheezing or increased work of breathing using Per Protocol order mode. 11-13 - enter or revise RT Bronchodilator order(s) to one equivalent RT bronchodilator order with QID Frequency and an Albuterol order with Frequency of every 4 hours PRN wheezing or increased work of breathing using Per Protocol order mode. Greater than 13 - enter or revise RT Bronchodilator order(s) to one equivalent RT bronchodilator order with every 4 hours Frequency and an Albuterol order with Frequency of every 2 hours PRN wheezing or increased work of breathing using Per Protocol order mode.      Electronically signed by Manuel Abad RCP on 5/20/2022 at 8:08 PM

## 2022-05-21 NOTE — PROGRESS NOTES
05/21/22 1912   NICU Vent Information   Vent Type 980   Vent Mode PS   Vt (Set, mL) 0 mL   Vt Exhaled 399 mL   Resp Rate (Set) 0 bmp   Rate Measured 15 br/min   Minute Volume 6.48 Liters   Peak Flow 0 L/min   Pressure Support 12 cmH20   FiO2  30 %   Peak Inspiratory Pressure 17 cmH2O   I:E Ratio 1:3.80   Sensitivity 2   High Peep/I Pressure 0   PEEP/CPAP (cmH2O) 5   I Time/ I Time % 0 s   Mean Airway Pressure 8 cmH20   Cough/Sputum   Sputum How Obtained Tracheal;Suctioned   Cough Non-productive   Breath Sounds   Right Upper Lobe Diminished   Right Middle Lobe Diminished   Right Lower Lobe Diminished   Left Upper Lobe Diminished   Left Lower Lobe Diminished   Additional Respiratory Assessments   Pulse 96   Resp 15   SpO2 95 %   Position Mejía's   Humidification Source Heated wire   Humidification Temp 37   Circuit Condensation Drained   Vent Alarm Settings   High Pressure  50 cmH2O   Low Minute Volume Alarm 3 L/min   Low Exhaled Vt  200 mL   RR High 30 br/min   Apnea (Secs) 20 secs   Ambu Bag With Mask At Bedside Yes

## 2022-05-21 NOTE — PROGRESS NOTES
Inpatient Occupational Therapy  Evaluation and Treatment    Unit: ICU  Date:  5/21/2022  Patient Name:    Zachariah Winston  Admitting diagnosis:  Hyperkalemia [E87.5]  Hypoglycemia [E16.2]  ESRD on dialysis [facility]) [N18.6, Z99.2]  Acute cystitis with hematuria [N30.01]  Septic shock ([facility] [address] [A41.9, R65.21]  Sepsis ([facility] [address] [A41.9]  Pneumonia due to infectious organism, unspecified laterality, unspecified part of lung [J18.9]  Admit Date:  4/21/2022  Precautions/Restrictions/WB Status/ Lines/ Wounds/ Oxygen: fall risk, IV, bed/chair alarm, langley catheter, telemetry, continuous pulse ox, ICU monitoring, intubated, isolation precautions (Contact), code status (Full) and CRRT, Paraplegic    Treatment Time:  8212-5106  Treatment Number: 2    Billable Treatment Time: 23 minutes   Total Treatment Time: 33  minutes    Patient Goals for Therapy:  None stated      Discharge Recommendations: SNF/LTACH  DME needs for discharge: defer to facility       Therapy recommendations for staff:  Assist of 2 for all bed mobility     History of Present Illness: ED note, 4/21/2022, Zahida Diehl:   \" 49 y.o. male with a past medical history of CAD, history of DVT, chronic kidney disease on dialysis Tuesday Thursday Saturday, COPD, hyperlipidemia, history of blood clots, sleep apnea, history of TIA, history of CVA, quadriplegic, CHF brought in today by EMS from dialysis for concern for hypotension. Patient states he did not complete dialysis today given his hypotension. Onset of symptoms occurred just prior to arrival to the ED. Duration of symptoms have been persistent since onset. Context includes concern for hypotension. He denies chest pain or shortness of breath. Denies any documented fevers chills nausea vomiting diarrhea. Denies abdominal pain. No aggravating symptoms. No alleviating symptoms. Otherwise denies any other complaints. Nothing seems to make symptoms better or worse. \"  Intubated 4/22/2022 - extubated 5/6/2022  Reintubated 5/11 for inability to clear secretions and hypercapnia    Home Health S4 Level Recommendation:  NA    AM-PAC Score: 6  Pt scored a 6/24 on the AM-PAC ADL Inpatient form. Current research shows that an AM-PAC score of 17 or less is typically not associated with a discharge to the patient's home setting. Information obtained from OT evaluation dated 5/6/22. Preadmission Environment    Pt. Lives with family (dtr Donovan Rollins)  Home environment:    tri-level home  Steps to enter main floor:       ramp entry to main level where patient stays  Bathroom: does bed baths, uses colostomy and straight caths self independently  Equipment owned: [employer], hospital bed with specialized ATILIO mattress, custom w/c, ROHO cushion     Preadmission Status:  Pt. Able to drive: No  Pt Fully independent with ADLs: No  Pt. Required assistance from family for: Bathing, Cleaning, Cooking, Dressing and Laundry     Pt reports he does not receive any aide services at this time. Pt. dependent for transfers with two people using Honey Lift  History of falls No    Pain:  Yes  Rating:  moderate  Location:   Shoulders during ROM   Pain Medicine Status:  No request made      Cognition:    A&O person-unable to assess otherwise  Able to follow 1 step commands, consistently. Subjective:  Pt supine in bed upon therapist arrival. Pt agreeable to work with therapy this date.      Upper Extremity ROM/strength: moderate to max verbal cues needed at times to complete  Finger flexion/ext: x10  Supination/pronation: x10 (limited to 0 degrees supination in R hand, 15 degrees in L hand)  Finger to thumb opposition: x10 with mod verbal cues and demo  Bicep curls: x10, R-30 degrees ext, 95 flexion   L-10 degrees ext, 130 flex  Shoulder flex: 10x  L 45 degrees flexion AAROM, 5 degrees AROM    R 30 degrees flexion AAROM, 0 degrees AROM  Horizontal abd/add: x5 AAROM, R shoulder abd to 30 degrees PROM, L abd to 45 degrees   3/5 B hands  Unable to achieve full ROM to any BUE joints     Upper Extremity Sensation:    WFL    Upper Extremity Proprioception:  NT    Coordination and Tone:  Impaired   Unable to touch mouth with either UE     Balance:  Functional Sitting Balance:  NT   Comments:  Bed in semi fowlers   Functional Standing Balance:NT   Comments:      Bed mobility:    Supine to sit:   Not Tested  Sit to supine:   Not Tested  Rolling:    Not tested  Scooting in sitting:  Not Tested  Scooting to head of bed:   Not tested   Bridging:   Not tested    Transfers:    Sit to stand:  Not Tested  Stand to sit:  Not Tested  Bed to chair:   Not Tested  Standard toilet: Not Tested  Bed to [facility]:  Not Tested    Activities of Daily Living:   UB Dressing:   Not tested  LB Dressing:    Not tested  UB Bathing:  Not Tested  LB Bathing:  Not Tested  Feeding:  Not Tested  Grooming:   Not Tested  Toileting:  Not Tested    Activity Tolerance:   Pt completed therapy session with No adverse symptoms noted w/activity    Positioning Needs: In bed, call light and needs in reach. Alarm Set    Exercise / Activities Initiated:   Educated on importance of moving each joint in various planes to improve movement. Pt demonstrated understanding. Patient/Family Education:   Role of OT  Recommendations for DC  HEP    Assessment of Deficits: Pt seen for Occupational therapy evaluation in acute care setting. Pt demonstrated decreased Activity tolerance, ADLs, IADLs, Balance , Bed mobility, Strength and Transfers. Pt functioning below baseline and will likely benefit from skilled occupational therapy services to maximize safety and independence. Goal(s): To be met in 3 Visits:  1. Tolerate HOB in chair position for 5 minutes, with SpO2 remaining above 90%. 2. Improve AROM in R elbow by 5 degrees to facilitate self feeding. 3. Increase AROM by 5 degrees in L shoulder to facilitate brushing hair. To be met in 5 Visits:  1.  Maintain trunk at midline with bed in chair position for 7 minutes. 2. Pt to independently complete HEP. 3. Pt to complete feeding with mod assistance. 4.   Pt to tolerate prateek transfer. Rehabilitation Potential:  Good for goals listed above. Strengths for achieving goals include: Family Support and Pt cooperative  Barriers to achieving goals include:  Complexity of condition     Plan: To be seen 3-5 x/wk while in acute care setting for strengthening, bed mobility, transfer training, family/patient education and ADL/IADL retraining.       Ana Lowe MS, OTR/L  #[id number]            If patient discharges from this facility prior to next visit, this note will serve as the Discharge Summary

## 2022-05-22 NOTE — PROGRESS NOTES
This RT present for code blue, bag/trach ventilation until pulse present, place patient back on vent A/C/ 490/16/100%/+5.   Yoli Madrid RRT

## 2022-05-22 NOTE — PROGRESS NOTES
Family at bedside. Talking with patient. Patients face turned pale, all color lost, BP dropped to 48/39, Levo upped to 50 mcg/min. Epinephrine at 3 mcg/min. Patient responded to medication. No other medication given at this time. P-116 B. P-122/88 O2-100%.     Electronically signed by Madi Bojorquez RN on 5/22/2022 at 7:35 AM

## 2022-05-22 NOTE — PLAN OF CARE
CODE BLUE was called for PEA  -Patient was unresponsive and was pulseless  -After 5 rounds of CPR, 4 epi and 3 amps of bicarb pt regained his pulse and is responsive   - Levo and epi gtt started, bolus NS running   - Family notified

## 2022-05-22 NOTE — PROGRESS NOTES
05/21/22 2327   NICU Vent Information   Vent Type 980   Vent Mode AC/VC   Vt (Set, mL) 490 mL   Vt Exhaled 504 mL   Resp Rate (Set) 16 bmp   Rate Measured 16 br/min   Minute Volume 4.52 Liters   Peak Flow 0 L/min   Pressure Support 0 cmH20   FiO2  30 %   Peak Inspiratory Pressure 17 cmH2O   I:E Ratio 1:5.60   Sensitivity 2   High Peep/I Pressure 0   PEEP/CPAP (cmH2O) 5   I Time/ I Time % 0 s   Humidification 98.6 °F (37 °C)   Mean Airway Pressure 7.9 cmH20   Plateau Pressure 27 BYS22   Cough/Sputum   Sputum How Obtained Suctioned;Tracheal   Cough Non-productive   Breath Sounds   Right Upper Lobe Diminished   Right Middle Lobe Diminished   Right Lower Lobe Diminished   Left Upper Lobe Diminished   Left Lower Lobe Diminished   Additional Respiratory Assessments   Pulse 96   Resp 16   Position Mejía's   Humidification Source Heated wire   Humidification Temp 37   Circuit Condensation Drained   Vent Alarm Settings   High Pressure  50 cmH2O   Low Minute Volume Alarm 3 L/min   Low Exhaled Vt  200 mL   RR High 30 br/min   Apnea (Secs) 20 secs   Ambu Bag With Mask At Bedside Yes
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
## 2022-05-22 NOTE — PROGRESS NOTES
V/S T-97.9 P-96 R-17 B. P-131/70 O2-100%    LEVOPHED infusing at a rate of 2 mcg/min. CRRT running okay at this time. Flow parameters are: , DIALYSATE 500, POST 500. Fluid removal goal is EVEN per nephrology. Patient tolerating Well at this time.

## 2022-05-22 NOTE — PROGRESS NOTES
Hospitalist ICU Progress Note      PCP: Peggy Omalley MD    Date of Admission: 4/21/2022    Subjective:     49 y.o. male with hx of ischemic CM, paraplegia, IDDM, ESRD on HD presents with bacteremia and decubitus ulcers, septic shock . off rose marie, dobutamine->  remains on levophed, vasopressin  Ongoing CRRT with fluid removal     5/11  Patient was on Airvo and BiPAP-> worsening hypoxemia-> intubated.  -> Continues to require pressors  -> On CRRT     5/15 s/p bronchoscopy with aspiration of mucus plugs . 5/16-s/p tracheostomy and PEG tube placement. 5/21  -Patient on assist control at night, tolerating pressure support ventilation during the day. FiO2 30% PEEP of 5. He is more awake and responsive, well-oriented  Continued on CRRT. Remains on pressors Levophed . 5/22     coded twice this morning. Currently on Levophed and epinephrine. CODE STATUS changed DNR CCA   pt has decided on W/D of care. Large number of family members members visiting. Once visitation completed plan is to stop pressors.   Patient and family wish discontinuation of vent also      Medications:  Reviewed    Infusion Medications    EPINEPHrine infusion 1 mcg/min (05/22/22 0901)    phenylephrine (ROSE MARIE-SYNEPHRINE) 50mg/250mL infusion      lactated ringers 999 mL/hr at 05/22/22 0812    norepinephrine 50 mcg/min (05/22/22 1228)    vasopressin (Septic Shock) infusion Stopped (05/21/22 0115)    dextrose      prismaSATE BGK 4/2.5 500 mL/hr at 05/21/22 2320    prismaSATE BGK 4/2.5 500 mL/hr at 05/21/22 2320    prismaSATE BGK 4/2.5 500 mL/hr at 05/21/22 2320    sodium chloride 5 mL/hr at 05/19/22 1900     Scheduled Medications    [START ON 5/23/2022] epoetin bebo-epbx  10,000 Units SubCUTAneous Once per day on Mon Wed Fri    hydrocortisone sodium succinate PF  100 mg IntraVENous Q8H    apixaban  5 mg Oral BID    ipratropium-albuterol  1 ampule Inhalation Q4H    midodrine  15 mg Oral TID WC    sodium chloride (Inhalant)  4 mL Nebulization Q12H    insulin glargine  15 Units SubCUTAneous Nightly    sodium chloride  500 mL IntraVENous Once    colistimethate (COLY-MYCIN) nebulization  150 mg Nebulization Q8H    collagenase   Topical Daily    sodium chloride  1,000 mL IntraVENous Once    sennosides-docusate sodium  2 tablet Oral BID    insulin lispro  0-18 Units SubCUTAneous Q4H    pantoprazole  40 mg IntraVENous Daily    menthol-zinc oxide   Topical Daily    aspirin  81 mg Oral Nightly    sodium chloride flush  5-40 mL IntraVENous 2 times per day     PRN Meds: LORazepam, HYDROmorphone, LORazepam, oxyCODONE-acetaminophen, sodium phosphate IVPB **OR** sodium phosphate IVPB **OR** sodium phosphate IVPB **OR** sodium phosphate IVPB, traZODone, ipratropium-albuterol, dextrose bolus **OR** dextrose bolus, glucagon (rDNA), dextrose, potassium chloride, magnesium sulfate, calcium gluconate **OR** calcium gluconate **OR** calcium gluconate **OR** calcium gluconate, glucose, midazolam, sodium chloride flush, sodium chloride, ondansetron **OR** ondansetron, polyethylene glycol, acetaminophen **OR** acetaminophen      Intake/Output Summary (Last 24 hours) at 5/22/2022 1417  Last data filed at 5/22/2022 0400  Gross per 24 hour   Intake 1178 ml   Output 1265 ml   Net -87 ml       Physical Exam Performed:    BP (!) 143/74   Pulse 123   Temp 98.3 °F (36.8 °C) (Temporal)   Resp 17   Ht 6' 2\" (1.88 m)   Wt 224 lb (101.6 kg)   SpO2 100%   BMI 28.76 kg/m²       General appearance:   on mechanical ventilation. Awake and responding. Trach to vent    Chronically ill-appearing  O2 sats stable at FiO2 30%  Tracheostomy in place  Ongoing CRRT  HEENT: NAD, pupils reactive to light  Neck: Supple,  No jugular venous distention. Tracheostomy present.    Respiratory: Diminished breath sounds, no rhonchi   Cardiovascular: Regular rate and rhythm  Right chest HD catheter and Port noted  Abdomen: Soft, non-tender, non distended with normal bowel sounds. Colostomy noted in left LQ/ PEG present  Musculoskeletal: Right BKA with stump healthy. Superficial skin ulcers on right thigh with no active infection noted  Left LE edema with superficial ulceration noted   Skin: see Epic wound pictures, chronic exposed hardware in lower thoracic and lumbar region             Neurologic: Awake, responsive,  paraplegic , atrophy of forearm and hand muscles   Fully awake  alert oriented    Labs:   Recent Labs     05/20/22  0220 05/21/22  0540 05/22/22  0545   WBC 15.2* 20.5* 35.7*   HGB 8.5* 8.6* 8.5*   HCT 28.3* 27.8* 29.1*    157 204     Recent Labs     05/21/22  1629 05/21/22  2312 05/22/22  0545   * 131* 136   K 3.7 3.9 4.3   CL 97* 97* 95*   CO2 27 26 25   BUN 15 14 14   CREATININE <0.5* <0.5* 0.6*   CALCIUM 8.4 8.1* 7.9*   PHOS 2.1* 2.2* 4.0     No results for input(s): AST, ALT, BILIDIR, BILITOT, ALKPHOS in the last 72 hours. Recent Labs     05/20/22  0220   INR 1.89*     Recent Labs     05/22/22  0545   TROPONINI 0.03*       Urinalysis:      Lab Results   Component Value Date    NITRU Negative 04/21/2022    WBCUA 21-50 04/21/2022    BACTERIA 4+ 04/21/2022    RBCUA  04/21/2022    BLOODU LARGE 04/21/2022    SPECGRAV >=1.030 04/21/2022    GLUCOSEU Negative 04/21/2022    GLUCOSEU >=1000 mg/dL 08/31/2010     Cultures  Blood - 4/21 - Group A , strep pyogenes and Enterococcus Faecalis   Repeat blood - NG 4/23  Urine - Ecoli , Enterococcus Faecalis   Sputum- Acinetobacter baumannii      Radiology:  XR CHEST PORTABLE   Final Result   No significant change         XR CHEST PORTABLE   Preliminary Result   Right internal jugular catheter with tip in the region of the inferior SVC,   although obscured by the spinal hardware. No pneumothorax. Otherwise no significant change.          VL Extremity Venous Bilateral   Final Result      XR CHEST PORTABLE   Final Result   Stable appearance of the chest with bilateral pleural effusions and   associated airspace change in the lower lung zones. XR CHEST PORTABLE   Final Result   Bilateral pleuroparenchymal disease, decreased on the left, but increased on   the right         US GUIDED PARACENTESIS   Final Result   Successful paracentesis. XR CHEST PORTABLE   Final Result   Endotracheal tube remains approximately 3 cm above the quintin. Poor inspiration with basilar airspace disease and possible effusion   primarily on the left. XR CHEST PORTABLE   Final Result   Persisting left lower lobe airspace disease with volume loss or collapse. Mucous plugging considered. Improving aeration right lower lobe. XR CHEST PORTABLE   Final Result   Persistent pleural-parenchymal disease at the lung bases, similar to prior. XR CHEST PORTABLE   Final Result   Improving left lower lobe consolidation         XR CHEST PORTABLE   Final Result   1. Unchanged position of support devices. 2. Vascular congestion, basilar opacities and small pleural effusions are   again demonstrated and without significant change. CT ABDOMEN PELVIS W IV CONTRAST Additional Contrast? None   Final Result   Increasing opacifications of the lung bases with small to moderate pleural   effusions right greater than left and adjacent opacifications which appear   greatest in the left lower lobe of worsening airspace disease and/or   atelectasis from prior comparison 04/21/2022      Similar appearance of small to moderate volume abdominopelvic ascites without   loculated or heterogeneous fluid collection otherwise evident greatest in the   perihepatic and perisplenic regions         XR CHEST PORTABLE   Final Result   1. Interval placement of endotracheal and orogastric tubes, in proper   positions. 2. Other support apparatus is stable and unchanged. 3. Stable small bilateral pleural effusions.    4. Progressive multifocal airspace opacities within the bilateral mid and   lower lung zones, likely a combination of progressive pulmonary edema and   multifocal pneumonia. XR CHEST PORTABLE   Final Result   Hypoinflated lungs with residual basilar opacities likely representing   atelectasis. The technique and hypoinflation will exaggerate the pulmonary vasculature. IR MIDLINE CATH   Final Result      XR CHEST PORTABLE   Final Result   Stable supportive devices. Increasing vascular congestion/mild pulmonary   edema. XR CHEST PORTABLE   Final Result   Low lung volumes with bibasilar atelectasis. Otherwise no acute process. XR CHEST PORTABLE   Final Result   Supportive tubing is in normal position. Stable left basilar opacity, atelectasis versus airspace disease. XR CHEST PORTABLE   Final Result   Slight improved aeration of the right lung. Bilateral pleuroparenchymal   disease remains         XR CHEST PORTABLE   Final Result   No significant change compared to chest x-ray from 05/01/2022. XR CHEST PORTABLE   Final Result   Low lung volumes with patchy airspace disease which may represent multifocal   atelectasis. Overall stable appearing chest.  Possible trace effusions. XR CHEST PORTABLE   Final Result   Relatively stable appearance of the chest with bibasilar airspace opacities. XR CHEST PORTABLE   Final Result   Low volume study with bilateral atelectasis or airspace disease, similar to   prior, with persistent small pleural effusions. XR CHEST PORTABLE   Final Result   Stable chest.         XR CHEST PORTABLE   Final Result   Endotracheal tube tip projects at the mid intrathoracic trachea. Otherwise   stable chest.         XR CHEST PORTABLE   Final Result   Suboptimal examination due to patient rotation. The chest appears stable. XR CHEST PORTABLE   Final Result   Endotracheal tube tip projects at the thoracic inlet.   Otherwise stable chest.         XR CHEST PORTABLE   Final Result   Stable chest.         XR CHEST PORTABLE   Final Result   Stable endotracheal tube located approximately 3.3 cm above the quintin. Low lung volumes. Suspected small right pleural effusion. XR CHEST PORTABLE   Final Result   Endotracheal tube in satisfactory position above the quintin      Bibasilar hypoaeration persist         CT ABDOMEN PELVIS W IV CONTRAST Additional Contrast? None   Final Result   1. Moderate amount of ascites with 3rd spacing including edematous changes   throughout the abdomen and anasarca. 2. Delgado in place. Diffuse bladder wall thickening. Correlate for   underlying cystitis. 3. No acute bowel pathology. Mild gastric distension. Diverticulosis with   no acute features. 4. Mild renal cortical scarring and asymmetric right renal atrophy, stable. No hydronephrosis. CT CHEST PULMONARY EMBOLISM W CONTRAST   Final Result   1. No evidence of acute pulmonary embolism. There is some thickening and   mild irregularity in the pulmonary arteries in the left lower lobe which may   represent chronic pulmonary embolism or secondary appearance to inflammation. No evidence of aortic aneurysm or dissection. 2. Moderate right effusion and right lower lobe atelectatic and/or   consolidative changes. Pneumonia is likely. There is severe loss of volume   in the right lung. Trace left effusion and left lower lobe atelectatic   changes are seen. 3. Moderate ascites around the spleen and liver. XR CHEST PORTABLE   Final Result   Low lung volumes. Bilateral pleural effusions with bibasilar volume loss,   right greater than left. No overt failure. Assessment/Plan:    Septic shock. Enterococcus faecalis and strep pyogenes bacteremia.   Right lower extremity cellulitis  Chronic pressure ulcers on the back with exposed hardware   Source could be HD line vs exposed hardware  Patient was on vancomycin, Merrem and clindamycin.  changed to  fortaz , tygacil and steffany nebs for acinetobacter. Completed 14 days of IV Vanco. ID managing this. Currently with sputum cultures positive for Acinetobacter . Antibiotics switched to  inhaled Coly-Mycin and IV Tygacil . ID following and currently continued on inhaled Coly-Mycin. Severe hypotensive shock  needing sammi, vaso, dobutamine and levophed    on hydrocortisone for shock-->weaned off   Critical care managing  -Persistent hypotension , remains on pressors-  Levophed; off  vasopressin. Has been difficult to wean offof pressors . Try to wean as tolerated    End-stage renal disease on hemodialysis. Nephrology consulted  Ongoing CRRT- fluid removal as tolerated  -Electrolytes being monitored and adjusted with CRRT  -Nephrology following, Vas-Cath was exchanged. Acute hypoxic resp failure  On mechanical ventilation. 4/22-5/6; reintubated on 5/11/2022  Healthcare associated pneumonia. Respiratory culture shows Acinetobacter baumannii  -Intubated in the ICU on 4/22-> extubated 5/6. On Airvo and BiPAP-> reintubated on 5/11/2022.   -Antibiotics as above. Patient followed by pulmonologist and ID  -S/p bronch 5/1/22 given worsening leucocytosis/fever  -cx with acinetobacter - was on IV ceftaz,tygacil and steffany nebs; then switched to IV Tygacil, and on inhaled Coly-Mycin. Currently on inh Coly-Mycin  -> Reintubated on 5/11/22 for worsening hypoxic hypercapnic respiratory failure  -> S/P bronch 5/15    --> S/p tracheostomy and PEG placement  on 5/16/2022. Continue vent weaning; currently on assist control at night and pressure support during the day.       Chronic systolic congestive heart failure, EF 25 to 30%. Ischemic cardiomyopathy  CAD s/p previous stents   Tachyarrhythmia/ NSVT  -Was seen by cardiology earlier in the admission , not following now . -  home medications on hold given hypotension  -Off dobutamine   -Resume ASA , statins  -Recurrent tachycardia  On 5/20-> prn dose of  IV digoxin given.   -Monitor and replace electrolytes     Acute metabolic encephalopathy resolved. -Secondary to septic shock  resolved     Type 2 diabetes.  -Lantus insulin and sliding scale insulin     History of DVT  Heparin-induced thrombocytopenia  -Stopped heparin and started on argatroban gtt->  switched to Eliquis now     Chronic wounds to low back, thoracic spine, ischium  DTI noted. -[facility] by Dr. [place]    Paraplegia  Neurogenic bladder   -bed bound ,supportive care  - intermittent self catherizations at home.   - had langley-> dced   Continue intermittent straight cath    Ascites  -paracentesis done and 3 L of fluid was removed. No subacute bacterial peritonitis. Diet: Diet NPO  ADULT TUBE FEEDING; PEG; Renal Formula; Continuous; 35; Yes; 10; Q 4 hours; 45; 30; Q 4 hours; Protein; one proteinex P2Go TWICE daily via feeding tube  Code Status: DNR-CCA         DNR CCA   Patient coded twice today.   Decision to withdraw care     stop pressors   Disconnect vent   prn versed/ ativan  For comfort   Patient allergic to morphine      Georgia Mendoza MD 5/22/2022 2:17 PM

## 2022-05-22 NOTE — CARE COORDINATION
DISCHARGE ORDER  Date/Time 2022 3:31 PM  Completed by: Luly Lyons RN, Case Management    Patient Name: Jamel Ayers      : 1965  Admitting Diagnosis: Hyperkalemia [E87.5]  Hypoglycemia [E16.2]  ESRD on dialysis ([facility] [address] [N18.6, Z99.2]  Acute cystitis with hematuria [N30.01]  Septic shock ([facility] [address] [A41.9, R65.21]  Sepsis ([facility] [address] [A41.9]  Pneumonia due to infectious organism, unspecified laterality, unspecified part of lung [J18.9]      Admit order Date and Status:2022  (verify MD's last order for status of admission)      Noted discharge order. Discharge Plan: Reviewed chart. Per ARON Pardo RN note, Mechanical ventilation was d/c'd at 1428 and pt passed away with family at bedside at [phone].  called [facility] ([phone]) and left a VM for charge RN (pressed option #5) to inform her that pt had passed away. CRUZITO informed Latasha Banks RN of this. No further needs needed from CRUZITO.

## 2022-05-22 NOTE — PROGRESS NOTES
0800 - Shift assessment completed as documented on flowsheet. Family at bedside, pt awake and alert. CRRt on hold at this time. Epi 3mcg and levophed at 50 mcg. Orders from Dr Serge Mckay being completed. 0930 - Pt and family told writer that pt did not want to receive chest compressions if his heart would stop. Updated Dr Serge Mckay at this time. 1000 - pt stable but very critical at this time. CRRT remains on hold as pt and family discuss comfort care and withdrawaling care. Waiting on sister from [address] [place] [address] Ave and other family members     0 - Dr Ava Velez updated in am rounds. 1200 - no change at this time. Family remains at bedside.

## 2022-05-22 NOTE — PROGRESS NOTES
05/22/22 0317   NICU Vent Information   Vent Type 980   Vent Mode AC/VC   Vt (Set, mL) 490 mL   Vt Exhaled 471 mL   Resp Rate (Set) 16 bmp   Rate Measured 19 br/min   Minute Volume 9.39 Liters   Peak Flow 50 L/min   Pressure Support 0 cmH20   FiO2  30 %   Peak Inspiratory Pressure 27 cmH2O   I:E Ratio 1:1.70   Sensitivity 2   High Peep/I Pressure 0   PEEP/CPAP (cmH2O) 5   I Time/ I Time % 0 s   Mean Airway Pressure 13 cmH20   Plateau Pressure 26 INL62   Breath Sounds   Right Upper Lobe Diminished   Right Middle Lobe Diminished   Right Lower Lobe Diminished   Left Upper Lobe Diminished   Left Lower Lobe Diminished   Additional Respiratory Assessments   Pulse 82   Resp 17   SpO2 96 %   Position Mejía's   Humidification Source Heated wire   Humidification Temp 37   Circuit Condensation Drained   Vent Alarm Settings   High Pressure  50 cmH2O   Low Minute Volume Alarm 3 L/min   RR High 30 br/min
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
## 2022-05-22 NOTE — PROGRESS NOTES
Family approached writer about not wanting [facility]. Called [facility], was informed that pt was not a suitable donor and the body has been released. Family remains at bedside at this time.

## 2022-05-22 NOTE — PROGRESS NOTES
Critical Care Medicine ICU Progress Note    CC: Septic shock, respiratory failure    Events of Last 24 hours:   Had cardiac arrest <PEA  S/p Trach   Family about to make decision comfort care   Doing good with PSV 12/5 ,at night AC  On high dose levo 40 and epi 1 mcg   CRRTon hold    Vent 490/16/5/100   HIT score 4 and HIT +   restart elquis 5 mg bid'  Feeding at goal ,had BM   Old skin lesion/sores ,no acute issues,wound care           Invasive Lines:   Vas cath changed and tunneled removed    ,trilysis catheter   Sub clavian port     MV: 4/22- 5/6/2022. Reintubated 5/11 for inability to clear secretions and hypercapnia  Vent Mode: (S) AC/VC Resp Rate (Set): 16 bmp/Vt (Set, mL): 490 mL/ /FiO2 : 100 %  No results for input(s): PHART, QEM8TBA, PO2ART in the last 72 hours. IV:   EPINEPHrine infusion 1 mcg/min (05/22/22 0901)    phenylephrine (ROSE MARIE-SYNEPHRINE) 50mg/250mL infusion      lactated ringers 999 mL/hr at 05/22/22 0812    norepinephrine 40 mcg/min (05/22/22 0943)    vasopressin (Septic Shock) infusion Stopped (05/21/22 0115)    dextrose      prismaSATE BGK 4/2.5 500 mL/hr at 05/21/22 2320    prismaSATE BGK 4/2.5 500 mL/hr at 05/21/22 2320    prismaSATE BGK 4/2.5 500 mL/hr at 05/21/22 2320    sodium chloride 5 mL/hr at 05/19/22 1900       Intake/Output Summary (Last 24 hours) at 5/22/2022 1110  Last data filed at 5/22/2022 0400  Gross per 24 hour   Intake 1350 ml   Output 1279 ml   Net 71 ml     BP (!) 174/133   Pulse 118   Temp 95.8 °F (35.4 °C) (Temporal)   Resp 19   Ht 6' 2\" (1.88 m)   Wt 224 lb (101.6 kg)   SpO2 98%   BMI 28.76 kg/m²   16/490/8  General: ill appearing. Trach sedated. Eyes: PERRL. No sclera icterus. No conjunctival injection. ENT: No discharge. Pharynx clear. ET tube in place  Neck: Trachea midline. Normal thyroid. Resp: No accessory muscle use. Few crackles. No wheezing. Few rhonchi. CV: Regular rate. Regular rhythm.  No mumur or rub. No edema. GI: Non-tender. Non-distended. No masses. Skin: Warm and dry. No nodule on exposed extremities. No rash on exposed extremities. Lymph: No cervical LAD. No supraclavicular LAD. M/S: No cyanosis. No joint deformity. No clubbing. Neuro: Intubated. Alert and awake. Following commands.  Profound weakness with very poor ineffective cough   Psych: Noncommunicative unable to obtain      Scheduled Meds:   [START ON 5/23/2022] epoetin bebo-epbx  10,000 Units SubCUTAneous Once per day on Mon Wed Fri    hydrocortisone sodium succinate PF  100 mg IntraVENous Q8H    apixaban  5 mg Oral BID    ipratropium-albuterol  1 ampule Inhalation Q4H    midodrine  15 mg Oral TID WC    sodium chloride (Inhalant)  4 mL Nebulization Q12H    insulin glargine  15 Units SubCUTAneous Nightly    sodium chloride  500 mL IntraVENous Once    colistimethate (COLY-MYCIN) nebulization  150 mg Nebulization Q8H    collagenase   Topical Daily    sodium chloride  1,000 mL IntraVENous Once    sennosides-docusate sodium  2 tablet Oral BID    insulin lispro  0-18 Units SubCUTAneous Q4H    pantoprazole  40 mg IntraVENous Daily    menthol-zinc oxide   Topical Daily    aspirin  81 mg Oral Nightly    sodium chloride flush  5-40 mL IntraVENous 2 times per day     PRN Meds:  oxyCODONE-acetaminophen, sodium phosphate IVPB **OR** sodium phosphate IVPB **OR** sodium phosphate IVPB **OR** sodium phosphate IVPB, traZODone, ipratropium-albuterol, dextrose bolus **OR** dextrose bolus, glucagon (rDNA), dextrose, potassium chloride, magnesium sulfate, calcium gluconate **OR** calcium gluconate **OR** calcium gluconate **OR** calcium gluconate, glucose, midazolam, sodium chloride flush, sodium chloride, ondansetron **OR** ondansetron, polyethylene glycol, acetaminophen **OR** acetaminophen    Results:  CBC:   Recent Labs     05/20/22  0220 05/21/22  0540 05/22/22  0545   WBC 15.2* 20.5* 35.7*   HGB 8.5* 8.6* 8.5*   HCT 28.3* 27.8* 29.1*   MCV 88.8 89.0 92.7    157 204     BMP:   Recent Labs     05/21/22  1629 05/21/22  2312 05/22/22  0545   * 131* 136   K 3.7 3.9 4.3   CL 97* 97* 95*   CO2 27 26 25   PHOS 2.1* 2.2* 4.0   BUN 15 14 14   CREATININE <0.5* <0.5* 0.6*     LIVER PROFILE:   No results for input(s): AST, ALT, LIPASE, BILIDIR, BILITOT, ALKPHOS in the last 72 hours. Invalid input(s): AMYLASE,  ALB     Cultures:      4/21/2022 blood 202 Enterococcus faecalis (sensitive to ampicillin, gentamicin, vancomycin) and Streptococcus pyogenes  4/21/2022 SARS-CoV-2 and influenza are negative  4/21/2022 urine Enterococcus and E. Coli  4/30 trach aspirate: acinetobacter  5/1/22 Bronch bal: Acinetobacter  5/12 BAL Acinetobacter baumannii     CTPA 4/21/2022  Impression   1. No evidence of acute pulmonary embolism. [place] An is some thickening and   mild irregularity in the pulmonary arteries in the left lower lobe which may   represent chronic pulmonary embolism or secondary appearance to inflammation. No evidence of aortic aneurysm or dissection. 2. Moderate right effusion and right lower lobe atelectatic and/or   consolidative changes.  Pneumonia is likely. [place] An is severe loss of volume   in the right lung.  Trace left effusion and left lower lobe atelectatic   changes are seen. 3. Moderate ascites around the spleen and liver. ACT 4/21/22  Impression   1. Moderate amount of ascites with 3rd spacing including edematous changes   throughout the abdomen and anasarca. 2. Delgado in place.  Diffuse bladder wall thickening.  Correlate for   underlying cystitis. 3. No acute bowel pathology.  Mild gastric distension.  Diverticulosis with   no acute features. 4. Mild renal cortical scarring and asymmetric right renal atrophy, stable. No hydronephrosis.      Chest x-ray 5/15 imaging reviewed by me and showed  Satisfactory ETT position- 26 cm   Bibasilar ASD with RLL collapse           ASSESSMENT:  S/p Multiple code blue 2 lats night   In shock ,need epi and Levop  Awake   Family around,want comfort measures no escalation of therapy   · Acute on chronic hypoxemic respiratory failure  · VAP/HCAP  · RLL collapse, mucus plug, pulmonary congestion with poor ineffective cough- today with high PAP  · Septic shock  · Bacteremia: Enterococcus faecalis and Streptococcus  · Dysphagia- FEES yesterday with secretion above VC  · Ischemic cardiomyopathy EF 25 to 30%  · PVCs with NSVT   · Right lower extremity cellulitis, H/O R BKA  · Chronic T-spine osteomyelitis is managed by Dr. Israel Herr  · Chronic decubitus ulcers  · End-stage kidney disease on HD  · LIBORIO  · H/O DVT on home Eliquis  · Paraplegia 2/2 [facility] 2013    PLAN:  Once sister arrive family and patient would like proceed with comfort measures,they do not want any further test or escalation of therapy  Comfort measures as per primary   palliative care consult   palliative and comfort measures as per primary     Care reviewed with nursing staff, medical and surgical specialty care, primary care and the patient's family as available. Chart review/lab review/X-ray viewing/documentation and had long Conversation with patient/family re: prognosis, care options and any end of life issues:      Critical care time spent reviewing labs/films, examining patient, collaborating with other physicians more than 35  Minutes  excluding procedures . Yokasta Freitas M.D.   5/22/2022  11:10 AM

## 2022-05-22 NOTE — PROGRESS NOTES
P- 89 R-17 B. P-92/64 O2-100%    Patient bathed at this time. Patient talking with staff and says he feels tired. Writer asked patient if therapy wore him out today, he grinned and shook his head yes. Told patient I would do his treatment to his back and then would let him rest.   Patient layed back, two staff members turned patient, did treatment, turned him back over and raised his head. Patient turned pale in his face, pulse dropped to 30 on monitor. Patient went unresponsive Pulse lost and Code called.

## 2022-05-23 ENCOUNTER — CARE COORDINATION (OUTPATIENT)
Dept: CARE COORDINATION | Age: 57
End: 2022-05-23

## 2022-05-23 NOTE — DISCHARGE SUMMARY
Death  Summary     Patient ID:  Jennifer Tristan  [id number]  49 y.o.  1965    Admit date: 2022    Discharge date and time: 2022    Patient  at 58    Admitting Physician: Leida Oliveira MD     Discharge Physician: Kush Irizarry MD    Admission Diagnoses: Hyperkalemia [E87.5]  Hypoglycemia [E16.2]  ESRD on dialysis [facility]) [N18.6, Z99.2]  Acute cystitis with hematuria [N30.01]  Septic shock ([address] [A41.9, R65.21]  Sepsis ([address] [A41.9]  Pneumonia due to infectious organism, unspecified laterality, unspecified part of lung [J18.9]    Discharge Diagnoses: Principal Problem:    Septic shock ([address]  Active Problems:    Cardiogenic shock ([address]    Pneumonia due to Acinetobacter species ([address]    Bacteremia    Mucus plugging of bronchi    Atelectasis of right lung    Acute on chronic respiratory failure with hypoxemia (HCC)    VAP (ventilator-associated pneumonia) (HCC)    ESRD (end stage renal disease) (HCC)    Pleural effusion    Pulmonary congestion    Dysphagia    Mild protein-calorie malnutrition (HCC)    Bullous cellulitis of right thigh    Hyperkalemia    Bacteremia due to Streptococcus pyogenes (HCC)    Acute on chronic combined systolic and diastolic CHF (congestive heart failure) ([address]    Anasarca associated with disorder of kidney    ESRD on dialysis [facility])  Resolved Problems:    Acute cystitis with hematuria    Streptococcal toxic shock syndrome ([address]    Pneumonia due to infectious organism    Hospital-acquired pneumonia      Admission Condition: Critical    Discharged Condition: Patient     Indication for Admission:   Per HPI  49 y.o. male who presented to the hospital from his dialysis unit where he had been getting dialysis with hypotension. He received about 50 minutes showed dialysis when he became hypotensive. Hypotension remained persistent and significant and he was brought to the emergency department to get evaluated. In the ER he was septic and hypotensive.   He had to be started on vasopressors and given a dose of hydrocortisone. He was immediately admitted to the ICU where I saw him. In ICU he has spiked a fever of 102.4 Fahrenheit. He is currently on 3 pressors. In emergency department work-up was concerning for healthcare associated pneumonia. He was also noted to have a pretty significant decubitus wound that appeared infected. His right lower extremity was also erythematous and appeared cellulitic. He he will continue to remain in the ICU for medical treatment. Hospital Course:     49 y.o. male with hx of ischemic CM, paraplegia, IDDM, ESRD on HD presented with bacteremia and decubitus ulcers, septic shock . Continue to require pressors, off sammi, dobutamine; but remained on levophed, vasopressin. Seen by intensivist  Seen by nephrologist and initiated CRRT ; ongoing CRRT with fluid removal .  Failed extubation and reintubated on 5/11 for worsening hypoxemia . 5/15 s/p bronchoscopy with aspiration of mucus plugs . 5/16-s/p tracheostomy and PEG tube placement. Patient was continued on mechanical ventilation, continued on CRRT . Remained  on pressors . 5/22  Coded twice this morning. S/P CPR   Currently on Levophed and epinephrine. CODE STATUS changed DNR CCA  Patient  has decided on W/D of care. He is still awake and responding  Large number of family members visiting. Decision made to withdraw care  Patient and family wished to start pressors and disconnect mechanical vent. Care withdrawn at 1419; all pressors discontinued. Disconnected from mechanical vent at 1428. Ativan and Versed ordered for comfort. Patient passed away at 1451         Diagnoses:    Septic shock. Enterococcus faecalis and strep pyogenes bacteremia.   Right lower extremity cellulitis  Chronic pressure ulcers on the back with exposed hardware   Source could be HD line vs exposed hardware  Patient was on vancomycin, Merrem and clindamycin.  changed to  fortaz , tygacil and steffany nebs for acinetobacter. Completed 14 days of IV Vanco. ID managing this. Currently with sputum cultures positive for Acinetobacter . Antibiotics switched to  inhaled Coly-Mycin and IV Tygacil . ID following and currently continued on inhaled Coly-Mycin. Severe hypotensive shock  needing sammi, vaso, dobutamine and levophed    on hydrocortisone for shock-->weaned off   Critical care managing  -Persistent hypotension , remains on pressors-  Levophed; off  vasopressin. Has been difficult to wean offof pressors . Try to wean as tolerated    - 5/22 CODE BLUE     End-stage renal disease on hemodialysis. Nephrology consulted  Ongoing CRRT- fluid removal as tolerated  -Electrolytes being monitored and adjusted with CRRT  -Nephrology following, Vas-Cath was exchanged. CRRT stopped after CODE BLUE     Acute hypoxic resp failure  On mechanical ventilation. 4/22-5/6; reintubated on 5/11/2022  Healthcare associated pneumonia. Respiratory culture shows Acinetobacter baumannii  -Intubated in the ICU on 4/22-> extubated 5/6. On Airvo and BiPAP-> reintubated on 5/11/2022.   -Antibiotics as above. Patient followed by pulmonologist and ID  -S/p bronch 5/1/22 given worsening leucocytosis/fever  -cx with acinetobacter - was on IV ceftaz,tygacil and steffany nebs; then switched to IV Tygacil, and on inhaled Coly-Mycin. Currently on inh Coly-Mycin  -> Reintubated on 5/11/22 for worsening hypoxic hypercapnic respiratory failure  -> S/P bronch 5/15    --> S/p tracheostomy and PEG placement  on 5/16/2022. Continue vent weaning; on assist control at night and pressure support during the day. -Withdrawal of care, mechanical vent disconnected per patient and family wishes     Chronic systolic congestive heart failure, EF 25 to 30%. Ischemic cardiomyopathy  CAD s/p previous stents   Tachyarrhythmia/ NSVT  -Was seen by cardiology earlier in the admission , not following now .   -  home medications on hold given hypotension  -Off dobutamine   -Resume ASA , statins  -Recurrent tachycardia  On 5/20-> prn dose of  IV digoxin given. -Monitored and replace electrolytes     Acute metabolic encephalopathy resolved. -Secondary to septic shock  resolved     Type 2 diabetes.  -Lantus insulin and sliding scale insulin     History of DVT  Heparin-induced thrombocytopenia  -Stopped heparin and started on argatroban gtt->  switched to Eliquis now      Chronic wounds to low back, thoracic spine, ischium  DTI noted. -[facility] by Dr. Mimi Gotti     Paraplegia  Neurogenic bladder   -bed bound ,supportive care  - intermittent self catherizations at home.   - had langley-> dced   Continue intermittent straight cath     Ascites  -paracentesis done and 3 L of fluid was removed. No subacute bacterial peritonitis.     Diet: Diet NPO  ADULT TUBE FEEDING; PEG; Renal Formula; Continuous; 35; Yes; 10; Q 4 hours; 45; 30; Q 4 hours; Protein; one proteinex P2Go TWICE daily via feeding tube  Code Status: DNR-CCA           Consults: pulmonary/intensive care, ID and nephrology and cardiology     Significant Diagnostic Studies:   Data:  CBC:   Recent Labs     05/20/22 0220 05/21/22  0540 05/22/22  0545   WBC 15.2* 20.5* 35.7*   RBC 3.18* 3.13* 3.14*   HGB 8.5* 8.6* 8.5*   HCT 28.3* 27.8* 29.1*   MCV 88.8 89.0 92.7   RDW 21.2* 21.8* 22.0*    157 204     BMP:   Recent Labs     05/21/22  1629 05/21/22  2312 05/22/22  0545   * 131* 136   K 3.7 3.9 4.3   CL 97* 97* 95*   CO2 27 26 25   PHOS 2.1* 2.2* 4.0   BUN 15 14 14   CREATININE <0.5* <0.5* 0.6*     BNP: No results for input(s): BNP in the last 72 hours.   PT/INR:   Recent Labs     05/20/22 0220   PROTIME 21.9*   INR 1.89*     APTT:   Recent Labs     05/19/22  2128 05/20/22 0220   APTT 91.3* 83.3*     CARDIAC ENZYMES:   Recent Labs     05/22/22  0545   TROPONINI 0.03*     FASTING LIPID PANEL:  Lab Results   Component Value Date    CHOL 80 01/07/2022    HDL 30 (L) 01/07/2022    TRIG 154 (H) 01/07/2022     LIVER PROFILE: No results for input(s): AST, ALT, ALB, BILIDIR, BILITOT, ALKPHOS in the last 72 hours.       Treatments: as above      Disposition: Patient       Signed:  Sharyn Arnett MD  2022

## 2022-05-23 NOTE — PROGRESS NOTES
Death within 24hrs/7days of removal of 2 point soft wrist restraints only. No reporting required to CMS.

## 2022-05-25 LAB
BLOOD CULTURE, ROUTINE: NORMAL
CULTURE, BLOOD 2: NORMAL

## 2022-06-12 LAB
FUNGUS (MYCOLOGY) CULTURE: ABNORMAL
FUNGUS STAIN: ABNORMAL
ORGANISM: ABNORMAL

## 2022-06-13 LAB
FUNGUS (MYCOLOGY) CULTURE: NORMAL
FUNGUS STAIN: NORMAL

## 2022-06-23 NOTE — PROGRESS NOTES
Physical Therapy Visit    Referred by: Mega Mclain MD; Medical Diagnosis (from order):    Diagnosis Information      Diagnosis    719.41 (ICD-9-CM) - M25.512 (ICD-10-CM) - Left shoulder pain, unspecified chronicity    840.4 (ICD-9-CM) - M75.102 (ICD-10-CM) - Tear of left rotator cuff, unspecified tear extent, unspecified whether traumatic              Visit: 32    Visit Type: Daily Treatment Note  Patient alert and oriented X3.    SUBJECTIVE                                                                                                               Patient verbally consented to allow observer present during session.  Patient states that he is doing okay today, but didn't get much sleep last night so he is very tired. Left shoulder still feels tight today, and patient admits he did not do his home exercises this morning due to fatigue.   Pain / Symptoms:  Pain rating (out of 10): Current: 0 ; Best: 0; Worst: 0    OBJECTIVE                                                                                                                        TREATMENT                                                                                                                  Therapeutic Exercise:  UBE 5 minutes to warm up  L 4.0  Therapeutic Exercise with Blood Flow Restriction:   Sunray Personal Tourniquet System utilized    Patient has been made aware of potential contraindications and possible risks associated with use of modality and has given verbal consent to proceed with treatment. Patient was educated on expectations and symptoms following treatment with use of blood flow restriction.     18 inch Matilda cuff utilized with Matilda protection sleeve; applied at proximal left upper extremity    Limb Occlusion Pressure 134 mmHg (measured in supine)    Personal Tourniquet Pressure 67 mmHg (50% of LOP)    Blood Pressure: 145/51 mmHg     Repetitions performed: 30/15/15/15 - 30 seconds rest break between each set and at least 60  [address] [facility] Physician Orders and Discharge [address], [address] [facility], [facility]  Telephone: [phone]      Fax: [phone]        Your home care [facility] wound-care supplies will be provided by: [facility] care     NAME: Jose Alfredo Wheat Jr   DATE of BIRTH:  1965  PRIMARY DIAGNOSIS FOR WOUND CARE CENTER:  Pressure Ulcer Stage 2     Wound cleansing:   Do not scrub or use excessive force. Wash hands with soap and water before and after dressing changes. Prior to applying a clean dressing, cleanse wound with normal saline, wound cleanser, or mild soap and water.  Ask your physician or nurse before getting the wound(s) wet in the shower.                Wound care for home:     Abdominal Wound:  Antibiotic ointment   Dry dressing   Change 3 x week     Left Great Toe Wound:  Betadine and tubular gauze, tape to foot and leave in place for the week      Left Knee Wound:  Aquaphor at home as needed      Left Lateral/Posterior Leg:  Vashe,Triad, Opticel Ag, Mepilex border   Leave in place this week    Make sure you are wearing your offloading boots when in bed      Right Knee:  Vashe  Versatil (need a few pieces to cover) then plain foam  cast padding x 2  Kerlix  Leave on for 1 week     Chronic Open Back Wound:   Dr. Aicha Sibley used Silver Nitrate on your wound today.  The wound will be black or silver in appearance  for the next several days, this is normal.   Triamcinolone to nitrated area  Spray Hypochlorous Acid into wound let sit, do not rinse  Calmoseptine to alin wound  Silver Alginate for drainage as needed make sure to use 1 big piece over back wounds not individual pieces (we are applying today) and if home care uses drawtex make sure it is 1 piece as well  Cover with a Mepilex Border making sure to cover the skin tear   Change 3 x week     Scrotum/Buttocks/Posterior Thigh:   Calmoseptine to protect skin     Please note, all wounds (unless stated otherwise here) were mechanically debrided at the time of cleansing here in the wound-care center today, so a small amount of pain, drainage or bleeding from that process might be expected, and is normal.      All products for home use, including multiple products for a single wound if applicable, are medically necessary in order to achieve the best chance at timely wound healing. See provider documentation for details if needed.     Substituted dressings applied in the [facility] today, if applicable:           New orders for this week (labs, imaging, medications, etc.):      Culture obtained form back wound today-no changes in medications or orders at this time     Additional instructions for specific diagnoses:     Continue to use the HIBICLENS (or the generic version) after your bath on the areas that are more troublesome such as your knee, chest and even around the back wound, make sure you let it dry, do not rinse        F/U Appointment is with Dr. Ana Champion in 1 week   on                                   at                       .     Your nurse  is Heidi FLOWERS.      If we applied slip-resistant hospital socks today, be sure to remove them at least once a day to inspect your toes or feet, even if you're not changing the wraps or dressings underneath.  If you see anything concerning (redness, excess moisture, etc), please call and let us know right away.     Should you experience any significant changes in your wound(s) (including redness, increased warmth, increased pain, increased drainage, odor, or fever) or have questions about your wound care, please contact the [address] [place] [address] at [phone] Monday-Thursday from 8:00 am - 4:30 pm, or Friday from 8:00 am - 2:30 pm.  If you need help with your wound outside these hours and cannot wait until we are again available, contact your home-care company (if applicable), your PCP, or go to the nearest emergency seconds deflation between each exercise.    Exercises performed:    Biceps curls, 1st set of 30 #2, remaining sets #1- Patient had difficult time maintaining jacoby     Results: improved strength, no adverse reaction to treatment      Education and review of home exercise program including repetitions, sets, frequency.   Adequate time spent on answering patient's questions and concerns regarding home exercise program    Therapeutic Exercise with Blood Flow Restriction:   [place] Personal Tourniquet System utilized    Patient has been made aware of potential contraindications and possible risks associated with use of modality and has given verbal consent to proceed with treatment. Patient was educated on expectations and symptoms following treatment with use of blood flow restriction.         Skilled input: verbal instruction/cues, as detailed above and tactile instruction/cues    Writer verbally educated and received verbal consent for hand placement, positioning of patient, and techniques to be performed today from patient for therapist position for techniques and hand placement and palpation for techniques as described above and how they are pertinent to the patient's plan of care.    Home Exercise Program/Education Materials: Access Code: [email or web address]  URL: [email or web address]  Date: 06/14/2022  Prepared by: Reena Denton    Exercises  · Shoulder Internal Rotation with Resistance - 2 x daily - 7 x weekly - 1-2 sets - 10 reps - 5 hold  · Shoulder External Rotation with Anchored Resistance - 2 x daily - 7 x weekly - 1-2 sets - 10 reps - 5 hold  · Prone Shoulder Horizontal Abduction - 2 x daily - 7 x weekly - 1-2 sets - 10 reps - 5 hold  · Standing Shoulder Internal Rotation Stretch with Towel - 2 x daily - 7 x weekly - 1-2 sets - 30 hold  · Standing Shoulder Scaption with Dumbbell - 2 x daily - 7 x weekly - 1-2 sets - 10 reps - 5 hold  · Standing Full Range Shoulder Flexion with Dumbbells - 2  room x daily - 7 x weekly - 1-2 sets - 10 reps - 5 hold  · Supine Elbow Extension Stretch with Weight - 2 x daily - 7 x weekly - 1-2 sets - 10 reps - 30 hold  · Standing Shoulder Single Arm PNF D2 Flexion with Anchored Resistance - 1 x daily - 7 x weekly - 3 sets - 10 reps         ASSESSMENT                                                                                                             Patient continues to make steady progress with left shoulder strength but is still lacking endurance with repetitions of dynamic movements . Blood flow restriction during resisted bicep curls was utilized to address his lack of tolerance to repeatitive motion at the shoulder.  Home exercise program was reviewed and all of his questions regarding determining progressions and frequency were addressed. Patient is appropriate for continuing skilled physical therapy to increase strength and endurance in left shoulder musculature.   Pain/symptoms after session (out of 10): 0  Patient Education:   Results of above outlined education: Verbalizes understanding and Demonstrates understanding    Care approved by and performed under the direction of on-site therapist. Student’s note read and approved.  Michael Soares, PT          PLAN                                                                                                                           Suggestions for next session as indicated: Progress per plan of care,  progress resistance to strengthen in conjunction to continuing stretching of restrictive surrounding musculature.progress towards higher level endurance exercise with arm overhead , joint mobilization for facilitation of internal rotation behind back, progress over head shoulder press, Blood flow restriction (use 24 inch cuff)          Therapy procedure time and total treatment time can be found documented on the Time Entry flowsheet

## 2022-06-28 LAB
AFB CULTURE (MYCOBACTERIA): NORMAL
AFB CULTURE (MYCOBACTERIA): NORMAL
AFB SMEAR: NORMAL
AFB SMEAR: NORMAL

## 2022-07-09 NOTE — PLAN OF CARE
Wounds overall stable, debridement of left & right ischial wounds & thoracic wound per MD. Right ischial wound alin-wound skin with irritation noted, will hold NPWT this week & C to change dressing per MD orders. Otherwise cont. With current wound care regime as ordered. Pt. Has been remaining off-loaded well & reinforced to cont. , to assist with wound healing. New left heel off-loading boot with wedge provided today to help with left leg turning outward laterally & hopefully help with pressure noted on left lower leg. Cont. With football dressing for off-loading as well on left foot. Pt. To cont. Antibiotics as ordered, until completed on 6/9/19, then [facility] to [facility] on Monday 6/10/19. F/u in [facility] in 1 week as ordered. Discharge instructions reviewed with patient, all questions answered, copy given to patient. Dressings were applied to all wounds per M.D. Instructions at this visit.
Physically safe environment: no spills, clutter or unnecessary equipment/Stretcher in lowest position, wheels locked, appropriate side rails in place/Provide visual cue, wrist band, yellow gown, etc.

## 2022-07-23 NOTE — OP NOTE
OPERATIVE REPORT   Patient: Diane Rios  YOB: 1965  MRN: [id number]  Date of Procedure: 7/30/2018    Pre-Op Diagnosis: OSTEOMYELITIS RIGHT AND LEFT FOOT/ANKLE, STAGE 3 PRESSURE ULCER LEFT HEEL CHRONIC ULCER HEEL, MID FOOT WITH FAT LAYER EXPOSED RIGHT  Post-Op Diagnosis: Same  Procedure(s):  INCISION AND DRAINAGE WITH DELAYED PRIMARY CLOSURE and, SPLIT THICKNESS SKIN GRAFT TO RIGHT HEEL, SPLIT THICKNESS SKIN GRAFT TO LEFT HEEL, APPLICATION OF TOTAL CONTACT CAST, BILATERAL,  APPLICATION OF WOUND VAC DRESSING, BILATERAL HEEL, MULTIPLE FOOT WOUNDS BILATERAL  Anesthesia: Anesthesia type not filed in the log. Surgeon(s):  Arie Morales DPM  Staff:Scrub Person First: Criss Colvin   Assistant(s):Elizabeth Simon Hodgkin PGY2  Injectables: none   Hemostasis: anatomic dissection  Materials:  3-0 nylon  3-0 vicryl   Split thickness skin graft   Staples   Wound vac application      Estimated Blood Loss: 470   Complications: None  Specimens: Was Not Obtained    INDICATIONS FOR PROCEDURE:   49 yo male s/p I&D with amputation of third and fourth ray and 4th and 5th toe with removal of cuboid and lateral cuneiform on 7/26/2018 returns back to OR for further debridement and delayed primary closure and split thickness skin graft application. All potential risks, benefits, and complications were discussed with the patient prior to the scheduling of surgery. The patient wished to proceed with surgery, and informed written consent was obtained. DETAILS OF PROCEDURE: The patient was brought from the pre-operative area and placed on the operating table in the supine position. Following IV sedation, a local anesthetic block was then injected proximal to the incision site. The bilateral  lower extremity was then scrubbed, prepped, and draped in the usual sterile fashion. A time-out was performed. The patient, procedure, and operative site were confirmed.      Attention was directed towards the open wound on the lateral aspect of the right foot where it extends from proximal 3rd digit down to the posterior heel. The wound was noted to have some necrosis with a granular base. A #15 blade was used for further debridement and removal of necrotic skin edges to healthy bleeding edges. Sharp and blunt debridement was made until no further necrotic or fibrotic tissue was noted. After inspection of the surgical wound there was no other signs of the infection tracking. Next, pulse lavage was performed using 3000 mL sterile saline with gentamycin. No additionally purulence was noticed following irrigation. The skin was then closed using 3-0 nylon. However, due to the lack of healthy tissue and skin noted on the right heel, it was planned to apply a split thickness skin graft. Next, using a skin marker a rectangular area measuring approximately 2 inch x 4 inch was marked on the posterior lateral left upper thigh. Mineral oil was placed on the donor site to provide a smooth gliding surface for the dermatome. A [employer] Dermatome with a 2 inch wide cutting plate was set to 9.5809 ZDSB thickness and a partial thickness skin graft was taken from the posterior lateral upper thigh. The donor site was then dressed using tegaderm. Next, the partial thickness skin graft was meshed at a ratio of 1:3 with the assistance of a manual meshing device. The skin graft was then placed in a sterile saline gauze. Attention was then directed towards the wound on the heels. The left posterior heel was debrided using #15 blade until granular base was noted. The wound was noted to be approximately 3.5 cm x 3 cm. Next the meshed partial thickness was skin graft was split in half where one portion was applied to the right posterior heel and the other to left posterior heel. The graft was then intact down with staples. At this time a piece of non-adherent dressing was placed over the wound sites.  Next, a [facility]  Wound Vac was placed over the patient's FEVER
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
## 2022-11-09 NOTE — PROGRESS NOTES
Scheduled medications given per MAR. VSS 2 liter, A/O x4, checked blood sugar device and was 109, denies any needs at this time. Call light in reach, will monitor, bed alarm on. Protopic Pregnancy And Lactation Text: This medication is Pregnancy Category C. It is unknown if this medication is excreted in breast milk when applied topically.

## 2023-01-01 NOTE — PROGRESS NOTES
CRRT stopped. Total volume of blood returned. Patient tolerated well. Vascath heparin locked per order. Normal patterns of skin texture/Normal patterns of skin integrity/Normal patterns of skin pigmentation/Normal patterns of skin color/Normal patterns of skin vascularity/Normal patterns of skin perfusion/No rashes/No eruptions

## 2023-02-21 NOTE — PLAN OF CARE
Please see the attached refill request.   Problem: Falls - Risk of:  Goal: Will remain free from falls  Description: Will remain free from falls  2/12/2022 1139 by Keny Olson RN  Outcome: Ongoing  2/11/2022 2245 by Anny Hurst RN  Outcome: Ongoing  Goal: Absence of physical injury  Description: Absence of physical injury  2/11/2022 2245 by Anny Hurst RN  Outcome: Ongoing     Problem: Skin Integrity:  Goal: Will show no infection signs and symptoms  Description: Will show no infection signs and symptoms  2/12/2022 1139 by Keny Olson RN  Outcome: Ongoing  2/11/2022 2245 by Anny Hurst RN  Outcome: Ongoing  Goal: Absence of new skin breakdown  Description: Absence of new skin breakdown  2/11/2022 2245 by Anny Hurst RN  Outcome: Ongoing  Goal: Status of oral mucous membranes will improve  Description: Status of oral mucous membranes will improve  2/11/2022 2245 by Anny Hurst RN  Outcome: Ongoing  Goal: Skin integrity will be maintained  Description: Skin integrity will be maintained  2/11/2022 2245 by Anny Hurst RN  Outcome: Ongoing     Problem: Nutrition  Goal: Optimal nutrition therapy  2/11/2022 2245 by Anny Hurst RN  Outcome: Ongoing  Goal: Understanding of nutritional guidelines  2/11/2022 2245 by Anny Hurst RN  Outcome: Ongoing     Problem: Pain:  Goal: Pain level will decrease  Description: Pain level will decrease  2/11/2022 2245 by Anny Hurst RN  Outcome: Ongoing  Goal: Control of acute pain  Description: Control of acute pain  2/12/2022 1139 by Keny Olson RN  Outcome: Ongoing  2/11/2022 2245 by Anny Hurst RN  Outcome: Ongoing  Goal: Control of chronic pain  Description: Control of chronic pain  2/12/2022 1139 by Keny Olson RN  Outcome: Ongoing  2/11/2022 2245 by Anny Hurst RN  Outcome: Ongoing     Problem: OXYGENATION/RESPIRATORY FUNCTION  Goal: Patient will maintain patent airway  2/11/2022 2245 by Anny Hurst RN  Outcome: Ongoing  Goal: Patient will achieve/maintain normal respiratory rate/effort  Description: Respiratory rate and effort will be within normal limits for the patient  2/11/2022 2245 by Bonifacio Cunningham RN  Outcome: Ongoing     Problem: HEMODYNAMIC STATUS  Goal: Patient has stable vital signs and fluid balance  2/11/2022 2245 by Bonifacio Cunningham RN  Outcome: Ongoing     Problem: FLUID AND ELECTROLYTE IMBALANCE  Goal: Fluid and electrolyte balance are achieved/maintained  2/12/2022 1139 by General Isaias RN  Outcome: Ongoing  2/11/2022 2245 by Bonifacio Cunningham RN  Outcome: Ongoing     Problem: ACTIVITY INTOLERANCE/IMPAIRED MOBILITY  Goal: Mobility/activity is maintained at optimum level for patient  2/11/2022 2245 by Bonifacio Cunningham RN  Outcome: Ongoing     Problem:  Activity:  Goal: Fatigue will decrease  Description: Fatigue will decrease  2/11/2022 2245 by Bonifacio Cunningham RN  Outcome: Ongoing  Goal: Risk for activity intolerance will decrease  Description: Risk for activity intolerance will decrease  2/11/2022 2245 by Bonifacio Cunningham RN  Outcome: Ongoing     Problem: Coping:  Goal: Ability to cope will improve  Description: Ability to cope will improve  2/11/2022 2245 by Bonifacio Cunningham RN  Outcome: Ongoing     Problem: Fluid Volume:  Goal: Will show no signs or symptoms of fluid imbalance  Description: Will show no signs or symptoms of fluid imbalance  2/11/2022 2245 by Bonifacio Cunningham RN  Outcome: Ongoing     Problem: Health Behavior:  Goal: Ability to manage health-related needs will improve  Description: Ability to manage health-related needs will improve  2/11/2022 2245 by Bonifacio Cunningham RN  Outcome: Ongoing  Goal: Identification of resources available to assist in meeting health care needs will improve  Description: Identification of resources available to assist in meeting health care needs will improve  2/11/2022 2245 by Bonifacio Cunningham RN  Outcome: Ongoing     Problem: Nutritional:  Goal: Ability to identify appropriate dietary choices will improve  Description: Ability to identify appropriate dietary choices will improve  2/11/2022 2245 by Brendon Thomas RN  Outcome: Ongoing     Problem: Physical Regulation:  Goal: Ability to maintain clinical measurements within normal limits will improve  Description: Ability to maintain clinical measurements within normal limits will improve  2/11/2022 2245 by Brendon Thomas RN  Outcome: Ongoing  Goal: Complications related to the disease process, condition or treatment will be avoided or minimized  Description: Complications related to the disease process, condition or treatment will be avoided or minimized  2/11/2022 2245 by Brendon Thomas RN  Outcome: Ongoing     Problem: Sensory:  Goal: General experience of comfort will improve  Description: General experience of comfort will improve  2/11/2022 2245 by Brendon Thomas RN  Outcome: Ongoing

## 2023-08-17 NOTE — TELEPHONE ENCOUNTER
Prescription approved per [facility] Refill Protocol.    Audra Nicole, BSN RN  [facility]     Would it be ok to do a rapid covid screen when he gets here day of surgery, due to he has to arrange for ambulance for transportation?

## 2023-08-21 NOTE — PROGRESS NOTES
4 Eyes Skin Assessment     The patient is being assess for   Transfer to New Unit    I agree that 2 RN's have performed a thorough Head to Toe Skin Assessment on the patient. ALL assessment sites listed below have been assessed. Areas assessed for pressure by both nurses:   [x]   Head, Face, and Ears   [x]   Shoulders, Back, and Chest, Abdomen  [x]   Arms, Elbows, and Hands   [x]   Coccyx, Sacrum, and Ischium  [x]   Legs, Feet, and Heels        Skin Assessed Under all Medical Devices by both nurses:  JESUSITA langley drain              All Mepilex Borders were peeled back and area peeked at by both nurses:  Yes  Please list where Mepilex Borders are located:  left heel, bilat chest, sacrum, back and right leg             Wounds as noted in wound care flow sheet- Right knee/leg - left and right buttock, posterior scrotum,     Co-signer eSignature: Electronically signed by Aristeo Seals RN on 7/16/21 at 3:00 PM EDT    Does the Patient have Skin Breakdown related to pressure?   Yes LDA WOUND CARE was Initiated documentation include the Chetna-wound, Wound Assessment, Measurements, Dressing Treatment, Drainage, and Color\",     (See wound not for photos)         Ismael Prevention initiated:  Yes   Wound Care Orders initiated:  Yes      WOC nurse consulted for Pressure Injury (Stage 3,4, Unstageable, DTI, NWPT, Complex wounds)and New or Established Ostomies:  Yes      Primary Nurse eSignature: Electronically signed by Casa Almeida RN on 7/16/21 at 2:39 PM EDT Bcc Pigmented Histology Text: Discrete nests/nodules of malignant basaloid tumor is present within the dermis and/or attached to the epidermis. The tumor demonstrates palisading and is retracted away from a surrounding mucoid stroma. Some areas of tumor show deposition of melanin.

## 2023-11-19 NOTE — PROGRESS NOTES
CRRT nearing end of 72 hr life and filter changed using strict sterile technique. Blood returned & filter changed per policy using strict sterile technique. Treatment stopped at 1802 and resumed at 1915.  See CRRT flowsheet Emerson Vyas RN Problem: PAIN - ADULT  Goal: Verbalizes/displays adequate comfort level or baseline comfort level  Description: Interventions:  - Encourage patient to monitor pain and request assistance  - Assess pain using appropriate pain scale  - Administer analgesics based on type and severity of pain and evaluate response  - Implement non-pharmacological measures as appropriate and evaluate response  - Consider cultural and social influences on pain and pain management  - Notify physician/advanced practitioner if interventions unsuccessful or patient reports new pain  Outcome: Progressing     Problem: INFECTION - ADULT  Goal: Absence or prevention of progression during hospitalization  Description: INTERVENTIONS:  - Assess and monitor for signs and symptoms of infection  - Monitor lab/diagnostic results  - Monitor all insertion sites, i.e. indwelling lines, tubes, and drains  - Monitor endotracheal if appropriate and nasal secretions for changes in amount and color  - [place] appropriate cooling/warming therapies per order  - Administer medications as ordered  - Instruct and encourage patient and family to use good hand hygiene technique  - Identify and instruct in appropriate isolation precautions for identified infection/condition  Outcome: Progressing  Goal: Absence of fever/infection during neutropenic period  Description: INTERVENTIONS:  - Monitor WBC    Outcome: Progressing     Problem: SAFETY ADULT  Goal: Patient will remain free of falls  Description: INTERVENTIONS:  - Educate patient/family on patient safety including physical limitations  - Instruct patient to call for assistance with activity   - Consult OT/PT to assist with strengthening/mobility   - Keep Call bell within reach  - Keep bed low and locked with side rails adjusted as appropriate  - Keep care items and personal belongings within reach  - Initiate and maintain comfort rounds  - Make Fall Risk Sign visible to staff  - Offer Toileting every 2 Hours, in advance of need  - Initiate/Maintain bed alarm  - Obtain necessary fall risk management equipment:   - Apply yellow socks and bracelet for high fall risk patients  - Consider moving patient to room near nurses station  Outcome: Progressing  Goal: Maintain or return to baseline ADL function  Description: INTERVENTIONS:  -  Assess patient's ability to carry out ADLs; assess patient's baseline for ADL function and identify physical deficits which impact ability to perform ADLs (bathing, care of mouth/teeth, toileting, grooming, dressing, etc.)  - Assess/evaluate cause of self-care deficits   - Assess range of motion  - Assess patient's mobility; develop plan if impaired  - Assess patient's need for assistive devices and provide as appropriate  - Encourage maximum independence but intervene and supervise when necessary  - Involve family in performance of ADLs  - Assess for home care needs following discharge   - Consider OT consult to assist with ADL evaluation and planning for discharge  - Provide patient education as appropriate  Outcome: Progressing  Goal: Maintains/Returns to pre admission functional level  Description: INTERVENTIONS:  - Perform AM-PAC 6 Click Basic Mobility/ Daily Activity assessment daily.  - Set and communicate daily mobility goal to care team and patient/family/caregiver. - Collaborate with rehabilitation services on mobility goals if consulted  - Perform Range of Motion 8 times a day. - Reposition patient every 2 hours.   - Dangle patient 3 times a day  - Stand patient 3 times a day  - Ambulate patient 3 times a day  - Out of bed to chair 3 times a day   - Out of bed for meals 3 times a day  - Out of bed for toileting  - Record patient progress and toleration of activity level   Outcome: Progressing     Problem: DISCHARGE PLANNING  Goal: Discharge to home or other facility with appropriate resources  Description: INTERVENTIONS:  - Identify barriers to discharge w/patient and caregiver  - Arrange for needed discharge resources and transportation as appropriate  - Identify discharge learning needs (meds, wound care, etc.)  - Arrange for interpretive services to assist at discharge as needed  - Refer to Case Management Department for coordinating discharge planning if the patient needs post-hospital services based on physician/advanced practitioner order or complex needs related to functional status, cognitive ability, or social support system  Outcome: Progressing     Problem: Knowledge Deficit  Goal: Patient/family/caregiver demonstrates understanding of disease process, treatment plan, medications, and discharge instructions  Description: Complete learning assessment and assess knowledge base.   Interventions:  - Provide teaching at level of understanding  - Provide teaching via preferred learning methods  Outcome: Progressing     Problem: HEMATOLOGIC - ADULT  Goal: Maintains hematologic stability  Description: INTERVENTIONS  - Assess for signs and symptoms of bleeding or hemorrhage  - Monitor labs  - Administer supportive blood products/factors as ordered and appropriate  Outcome: Progressing     Problem: Prexisting or High Potential for Compromised Skin Integrity  Goal: Skin integrity is maintained or improved  Description: INTERVENTIONS:  - Identify patients at risk for skin breakdown  - Assess and monitor skin integrity  - Assess and monitor nutrition and hydration status  - Monitor labs   - Assess for incontinence   - Turn and reposition patient  - Assist with mobility/ambulation  - Relieve pressure over bony prominences  - Avoid friction and shearing  - Provide appropriate hygiene as needed including keeping skin clean and dry  - Evaluate need for skin moisturizer/barrier cream  - Collaborate with interdisciplinary team   - Patient/family teaching  - Consider wound care consult   Outcome: Progressing     Problem: GASTROINTESTINAL - ADULT  Goal: Maintains adequate nutritional intake  Description: INTERVENTIONS:  - Monitor percentage of each meal consumed  - Identify factors contributing to decreased intake, treat as appropriate  - Assist with meals as needed  - Monitor I&O, weight, and lab values if indicated  - Obtain nutrition services referral as needed  Outcome: Progressing     Problem: METABOLIC, FLUID AND ELECTROLYTES - ADULT  Goal: Glucose maintained within target range  Description: INTERVENTIONS:  - Monitor Blood Glucose as ordered  - Assess for signs and symptoms of hyperglycemia and hypoglycemia  - Administer ordered medications to maintain glucose within target range  - Assess nutritional intake and initiate nutrition service referral as needed  Outcome: Progressing     Problem: SKIN/TISSUE INTEGRITY - ADULT  Goal: Incision(s), wounds(s) or drain site(s) healing without S/S of infection  Description: INTERVENTIONS  - Assess and document dressing, incision, wound bed, drain sites and surrounding tissue  - Provide patient and family education  - Perform skin care/dressing changes   Outcome: Progressing     Problem: MUSCULOSKELETAL - ADULT  Goal: Maintain or return mobility to safest level of function  Description: INTERVENTIONS:  - Assess patient's ability to carry out ADLs; assess patient's baseline for ADL function and identify physical deficits which impact ability to perform ADLs (bathing, care of mouth/teeth, toileting, grooming, dressing, etc.)  - Assess/evaluate cause of self-care deficits   - Assess range of motion  - Assess patient's mobility  - Assess patient's need for assistive devices and provide as appropriate  - Encourage maximum independence but intervene and supervise when necessary  - Involve family in performance of ADLs  - Assess for home care needs following discharge   - Consider OT consult to assist with ADL evaluation and planning for discharge  - Provide patient education as appropriate  Outcome: Progressing

## 2024-02-29 NOTE — PROGRESS NOTES
Hospitalist Progress Note      PCP: Pari Norton MD    Date of Admission: 4/21/2022    Subjective:     49 y.o. male with hx of ischemic CM, paraplegia, IDDM, ESRD on HD presents with bacteremia and decubitus ulcers, septic shock     Improving sepsis from last week, off sammi, vaso, dobutamine, now remains on levophed,      Ongoing CRRT with fluid removal   No fevers    Pt seen off vent this am, feels fine and reports weak cough       Medications:  Reviewed    Infusion Medications    vasopressin (Septic Shock) infusion Stopped (04/29/22 0008)    dextrose      prismaSATE BGK 4/2.5 500 mL/hr at 05/06/22 0557    prismaSATE BGK 4/2.5 500 mL/hr at 05/06/22 0557    prismaSATE BGK 4/2.5 500 mL/hr at 05/06/22 0557    norepinephrine 4 mcg/min (05/06/22 0407)    sodium chloride Stopped (05/02/22 1630)     Scheduled Medications    insulin glargine  25 Units SubCUTAneous Nightly    cefTAZidime (FORTAZ) IV  1,000 mg IntraVENous 3 times per day    tobramycin (PF)  300 mg Nebulization BID    sennosides-docusate sodium  2 tablet Oral BID    midodrine  10 mg Oral TID WC    insulin lispro  0-18 Units SubCUTAneous Q4H    chlorhexidine  15 mL Mouth/Throat BID    pantoprazole  40 mg IntraVENous Daily    ipratropium-albuterol  1 ampule Inhalation Q4H    menthol-zinc oxide   Topical Daily    apixaban  2.5 mg Oral BID    aspirin  81 mg Oral Nightly    sodium chloride flush  5-40 mL IntraVENous 2 times per day     PRN Meds: oxyCODONE-acetaminophen, carboxymethylcellulose PF **AND** artificial tears, traZODone, ipratropium-albuterol, dextrose bolus **OR** dextrose bolus, glucagon (rDNA), dextrose, potassium chloride, magnesium sulfate, calcium gluconate **OR** calcium gluconate **OR** calcium gluconate **OR** calcium gluconate, sodium phosphate IVPB **OR** sodium phosphate IVPB **OR** sodium phosphate IVPB **OR** sodium phosphate IVPB, glucose, midazolam, fentanNYL, sodium chloride flush, sodium chloride, ondansetron **OR** ondansetron, polyethylene glycol, acetaminophen **OR** acetaminophen, perflutren lipid microspheres      Intake/Output Summary (Last 24 hours) at 5/6/2022 0809  Last data filed at 5/6/2022 0700  Gross per 24 hour   Intake 2420 ml   Output 3635 ml   Net -1215 ml       Physical Exam Performed:    /61   Pulse 76   Temp 98.3 °F (36.8 °C) (Bladder)   Resp 21   Ht 6' 2\" (1.88 m)   Wt 250 lb 9.6 oz (113.7 kg)   SpO2 97%   BMI 32.18 kg/m²       General appearance:  off vent, fully awake, alert and oriented   HEENT: NAD,  Neck: Supple, . No jugular venous distention. Trachea midline. Respiratory: Diminished breath sounds bilaterally  Cardiovascular: S 1 s2 heard,  Right chest HD catheter and Port noted  Abdomen: Soft, non-tender, +distended with normal bowel sounds. Colostomy noted in left LQ  Musculoskeletal: Right BKA with stump healthy. Superficial skin ulcers on right thigh with no active infection noted  Left LE edema with superficial ulceration noted   Skin: see Epic wound pictures, chronic exposed hardware in lower thoracic and lumbar region   Neurologic: paraplegic , atrophy of forearm and hand muscles   Fully awake  alert oriented    Labs:   Recent Labs     05/04/22  0248 05/05/22  0605 05/06/22  0607   WBC 13.0* 10.8 7.9   HGB 10.2* 9.5* 9.5*   HCT 31.9* 29.4* 29.8*    145 192     Recent Labs     05/05/22  1503 05/05/22  2104 05/06/22  0309   * 131* 131*   K 4.0 4.1 3.8   CL 98* 98* 97*   CO2 26 26 25   BUN 19 21* 21*   CREATININE <0.5* <0.5* <0.5*   CALCIUM 8.4 8.4 8.5   PHOS 2.8 2.3* 2.5     No results for input(s): AST, ALT, BILIDIR, BILITOT, ALKPHOS in the last 72 hours. No results for input(s): INR in the last 72 hours. No results for input(s): Joyce Etelvina in the last 72 hours.     Urinalysis:      Lab Results   Component Value Date    NITRU Negative 04/21/2022    WBCUA 21-50 04/21/2022    BACTERIA 4+ 04/21/2022    RBCUA  04/21/2022    BLOODU LARGE 04/21/2022    SPECGRAV >=1.030 04/21/2022    GLUCOSEU Negative 04/21/2022    GLUCOSEU >=1000 mg/dL 08/31/2010       Radiology:  XR CHEST PORTABLE   Final Result   Stable supportive devices. Increasing vascular congestion/mild pulmonary   edema. XR CHEST PORTABLE   Final Result   Low lung volumes with bibasilar atelectasis. Otherwise no acute process. XR CHEST PORTABLE   Final Result   Supportive tubing is in normal position. Stable left basilar opacity, atelectasis versus airspace disease. XR CHEST PORTABLE   Final Result   Slight improved aeration of the right lung. Bilateral pleuroparenchymal   disease remains         XR CHEST PORTABLE   Final Result   No significant change compared to chest x-ray from 05/01/2022. XR CHEST PORTABLE   Final Result   Low lung volumes with patchy airspace disease which may represent multifocal   atelectasis. Overall stable appearing chest.  Possible trace effusions. XR CHEST PORTABLE   Final Result   Relatively stable appearance of the chest with bibasilar airspace opacities. XR CHEST PORTABLE   Final Result   Low volume study with bilateral atelectasis or airspace disease, similar to   prior, with persistent small pleural effusions. XR CHEST PORTABLE   Final Result   Stable chest.         XR CHEST PORTABLE   Final Result   Endotracheal tube tip projects at the mid intrathoracic trachea. Otherwise   stable chest.         XR CHEST PORTABLE   Final Result   Suboptimal examination due to patient rotation. The chest appears stable. XR CHEST PORTABLE   Final Result   Endotracheal tube tip projects at the thoracic inlet. Otherwise stable chest.         XR CHEST PORTABLE   Final Result   Stable chest.         XR CHEST PORTABLE   Final Result   Stable endotracheal tube located approximately 3.3 cm above the quintin. Low lung volumes. Suspected small right pleural effusion.          XR CHEST PORTABLE   Final acinetobacter,  Also on Vanc. ID managing this. Severe hypotensive shock  needing sammi, vaso, dobutamine and levophed    on hydrocortisone for shock-->weaned off   Critical care managing  Improving hypotension, improved wbc, now only on levo     End-stage renal disease on hemodialysis. Nephrology consulted  Initiated on CRRT   Fluid removal as tolerated , CRRT running neg      Acute hypoxic resp failure  Healthcare associated pneumonia. Intubated in the ICU on 4/22. Antibiotics as above. S/p bronch 5/1/22 given worsening leucocytosis/fever  cx with acinetobacter   Weaned off vent now  IV ceftaz and steffany nebs      Chronic systolic congestive heart failure, EF 25 to 30%. Ischemic CM/CAD s/p previous stents   Cardiology consulted  Hold home medications given hypotension  Off dobutamine   Considering to resume BB ,   Resume ASA , statins       Acute metabolic encephalopathy resolved. Secondary to septic shock  resolved     Type 2 diabetes.   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 Kary Zacarias     Paraplegia  Neurogenic bladder   - bed bound   - supportive care  - intermittent self catherizations , now has langley       Diet: Diet NPO  ADULT TUBE FEEDING; Orogastric; Renal Formula; Continuous; 35; Yes; 5; Q 4 hours; 40; 30; Q 4 hours; Protein; one proteinex P2Go TWICE daily via feeding tube  Code Status: Full Code    Dispo - cont care  updated family     Marilyn Rodriguez MD 01-Jan-2016

## 2024-03-27 NOTE — TELEPHONE ENCOUNTER
----- Message from Brown Dinero MD sent at 10/15/2020  8:06 AM EDT -----  Nothing to do  ----- Message -----  From: Jacque Stallings  Sent: 10/14/2020   1:14 PM EDT  To: Brown Dinero MD    Pt states urine culture was taken care of by a dr covering for you. States he was on cipro for 7 days and finished it Saturday but he is still having sediment in his urine and high glucose. Please advise.
Pt informed.
Render In Strict Bullet Format?: No
Detail Level: Zone
Modify Regimen: Recommended Redensity shampoo and conditioner.
Continue Regimen: Propecia QD(year supply).\\n\\nAlopecia solution.

## 2025-01-06 NOTE — CONSULTS
Patient ID: Matheus Carvajal is a 21 y.o. male who presents for Ear Fullness (Left ear. Started yesterday, then unclogged itself. Felt clogged again upon waking up this morning.).  Today he is un-accompanied .     Concerned about several days of clogged ears with decreased hearing.  Denies nasal drainage, congestion, and cough.  Denies fevers, vomiting, diarrhea, rash, otalgia.        Current Outpatient Medications:     albuterol 90 mcg/actuation inhaler, Inhale 2 puffs every 6 hours if needed for wheezing. With spacer every 4-6 hours, prn cough/wheeze, Disp: 18 g, Rfl: 11    carbamide peroxide (Debrox) 6.5 % otic solution, Administer 3-5 drops into each ear 2 times a day for 4 days., Disp: 15 mL, Rfl: 0    cetirizine (ZyrTEC) 10 mg tablet, TAKE 1 TABLET BY MOUTH EVERYDAY AT BEDTIME, Disp: 90 tablet, Rfl: 1    dicyclomine (Bentyl) 20 mg tablet, TAKE 1 TABLET (20 MG) BY MOUTH 3 TIMES A DAY., Disp: 90 tablet, Rfl: 3    DULoxetine (Cymbalta) 60 mg DR capsule, Take 1 capsule (60 mg) by mouth once daily. Do not crush or chew., Disp: 90 capsule, Rfl: 1    fluticasone (Flonase) 50 mcg/actuation nasal spray, SHAKE LIQUID AND INSTILL 4 SPRAYS IN EACH NOSTRIL EVERY MORNING, Disp: 96 mL, Rfl: 1    Past Medical History:   Diagnosis Date    Abdominal pain     Anxiety     Auditory hallucinations 2017    Auditory hallucinations    COVID-19 2021    COVID-19    Depression     Diarrhea     Other conditions influencing health status 2019    Birth of     Other problems related to lifestyle 2021    Deliberate self-cutting    Otitis media, unspecified, unspecified ear 2020    Recurrent otitis media    Personal history of other medical treatment 2019    History of being hospitalized    Personal history of other mental and behavioral disorders 2022    History of depression    Reactive airway disease ([facility])     Visual hallucinations 2017    Visual hallucinations       Past  Pharmacy Note  Vancomycin Consult    Zenaida Morgan is a 50 y.o. male started on Vancomycin for sepsis; consult received from Dr. Matias Marks to manage therapy. Also receiving the following antibiotics: Meropenem. Patient Active Problem List   Diagnosis    Mixed hyperlipidemia    Coronary artery disease involving native coronary artery of native heart without angina pectoris    Paraplegia, complete ([facility])    Chronic back pain    Arthritis    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 ([facility])    Iron deficiency anemia due to chronic blood loss    Lymphedema of both lower extremities    Neurogenic bladder    Neurogenic bowel    Hypergranulation    Dehiscence of surgical wound of T-spine    Onychomycosis    Dystrophic nail    Ischemic cardiomyopathy    Gitelman syndrome    Acute on chronic systolic congestive heart failure ([facility])    LIBORIO on CPAP    Hyperglycemia due to diabetes mellitus ([facility]    Type 2 diabetes mellitus with diabetic peripheral angiopathy without gangrene, with long-term current use of insulin ([facility])    Ulcer of left chest wall with fat layer exposed, following recent abscess    Moderate persistent asthma without complication    SUDHA (acute kidney injury) ([address]    Choledocholithiasis    Ulcer of right knee, limited to breakdown of skin ([address]    Diabetes mellitus ([address]    CHF (congestive heart failure), NYHA class I, acute on chronic, combined ([facility]     Allergies:  Benadryl [diphenhydramine hcl], Keflex [cephalexin], Statins, Cephalexin, Morphine, Penicillins, and Sulfa antibiotics     Temp max: 95.5 degrees F    Recent Labs     09/05/21  1304   BUN 96*   CREATININE 2.0*   WBC 8.7     No intake or output data in the 24 hours ending 09/05/21 2008  Culture Date      Source                       Results      Ht Readings from Last 1 Encounters:   09/05/21 6' 2\" (1.88 m)        Wt Readings from Last 1 Encounters:   09/05/21 250 lb (113.4 kg)       Body mass index is 32.1 kg/m².     Estimated Creatinine Clearance: 57 Surgical History:   Procedure Laterality Date    COLONOSCOPY W/ BIOPSIES  10/10/2023    ESOPHAGOGASTRODUODENOSCOPY  10/10/2023    OTHER SURGICAL HISTORY  11/15/2019    Surgery       Family History   Problem Relation Name Age of Onset    No Known Problems Mother         Social History     Tobacco Use    Smoking status: Never     Passive exposure: Never    Smokeless tobacco: Never   Vaping Use    Vaping status: Every Day       Objective   /62   Pulse 85   Temp 36.8 °C (98.3 °F) (Skin)   Resp 20   Wt 59.3 kg (130 lb 12.8 oz)   SpO2 98%   BMI 19.09 kg/m²   BSA: 1.7 meters squared        BMI: Body mass index is 19.09 kg/m².   Growth percentiles: Height:  No height on file for this encounter.   Weight:  14 %ile (Z= -1.07) based on CDC (Boys, 2-20 Years) weight-for-age data using data from 1/6/2025.  BMI:  7 %ile (Z= -1.48) based on CDC (Boys, 2-20 Years) BMI-for-age data using weight from 1/6/2025 and height from 8/15/2024.    PHYSICAL EXAM  General   -- Appearance - Not in acute distress, Not Irritable, Not Lethargic / Slow.    -- Build & Nutrition - Well developed and Well nourished.    -- Hydration - Well hydrated.    Integumentary    -- No visible rashes.      Head  - - normalocephalic    HEENT  -- TM's obsucrred by copious cerumen bilaterally.    Right was able to be disimpacted with normal TM:  no effusion,  no injection.      Ophthamologic:    --  eye are nonicteric    Neck  --  range of motion is full    Infectious Disease  -- No Meningeal Signs    Vascular  --  Skin well profused    Respiratory  -- No respiratory Distress.    Neurologic  --  CN II-XII grossly intact.      Psychiatric  --  mental status is undisturbed      Assessment/Plan   Problem List Items Addressed This Visit    None  Visit Diagnoses       Hearing difficulty of both ears    -  Primary    Bilateral impacted cerumen        Relevant Medications    carbamide peroxide (Debrox) 6.5 % otic solution          Procedure indication:    mL/min (A) (based on SCr of 2 mg/dL (H)). Goal Trough Level: 15-20 mcg/mL    Assessment/Plan:  Will initiate Vancomycin with a one time loading dose of 1500 mg x1, followed pulse dosing. Timing of trough level will be determined based on culture results, renal function, and clinical response. Thank you for the consult. Will continue to follow.   DIEGO [email or web address].Jose.9/5/20218:09 PM Impacted Cerumen Bilaterally  Procedure to be performed:  Bilateral cerumen removal    Discussed risk / benefits of procedure.  Verbal consent obtained.  Cerumen was removed manually from bilateral external auditory canals with cerumen spoon using standard technique.  Procedure tolerated without complications.        aMni Kc MD

## (undated) DEVICE — GOWN,SIRUS,POLYRNF,SETINSLV,L,20/CS: Brand: MEDLINE

## (undated) DEVICE — SOLUTION IV IRRIG 500ML 0.9% SODIUM CHL 2F7123

## (undated) DEVICE — STANDARD HYPODERMIC NEEDLE,POLYPROPYLENE HUB: Brand: MONOJECT

## (undated) DEVICE — ENDO CARRY-ON PROCEDURE KIT INCLUDES SUCTION TUBING, LUBRICANT, GAUZE, BIOHAZARD STICKER, TRANSPORT PAD AND INTERCEPT BEDSIDE KIT.: Brand: ENDO CARRY-ON PROCEDURE KIT

## (undated) DEVICE — GLOVE ORANGE PI 8   MSG9080

## (undated) DEVICE — KIT,ANTI FOG,W/SPONGE & FLUID,SOFT PACK: Brand: MEDLINE

## (undated) DEVICE — 3M™ TEGADERM™ TRANSPARENT FILM DRESSING FRAME STYLE, 1626W, 4 IN X 4-3/4 IN (10 CM X 12 CM), 50/CT 4CT/CASE: Brand: 3M™ TEGADERM™

## (undated) DEVICE — SUTURE VCRL SZ 4-0 L18IN ABSRB UD L19MM PS-2 3/8 CIR PRIM J496H

## (undated) DEVICE — SOLUTION IV 1000ML 0.9% SOD CHL

## (undated) DEVICE — GAUZE SPONGES,8 PLY: Brand: CURITY

## (undated) DEVICE — NEEDLE HYPO 25GA L1.5IN BLU POLYPR HUB S STL REG BVL STR

## (undated) DEVICE — BLADE ES L2.75IN ELASTOMERIC COAT DURABLE BEND UPTO 90DEG

## (undated) DEVICE — TISSUE RETRIEVAL SYSTEM: Brand: INZII RETRIEVAL SYSTEM

## (undated) DEVICE — CANNULATING SPHINCTEROTOME: Brand: TRUETOME JAG 44

## (undated) DEVICE — CORD ES L10FT MPLR LAP

## (undated) DEVICE — YANKAUER,BULB TIP,W/O VENT,RIGID,STERILE: Brand: MEDLINE

## (undated) DEVICE — CO2 INSUFFLATION NEEDLE: Brand: CORE DYNAMICS

## (undated) DEVICE — PREP SOL PVP IODINE 4%  4 OZ/BTL

## (undated) DEVICE — TUBE TRACH AD SZ 6 L77MM INNR CANN ID65MM OUTER CANN

## (undated) DEVICE — TUBING INSUF ISO CONN DISP

## (undated) DEVICE — HOLDER SCALP PLAS G STD

## (undated) DEVICE — SYRINGE MED 10ML TRNSLUC BRL PLUNG BLK MRK POLYPR CTRL

## (undated) DEVICE — 3M™ TEGADERM™ TRANSPARENT FILM DRESSING FRAME STYLE, 1627, 4 IN X 10 IN (10 CM X 25 CM), 20/CT 4CT/CASE: Brand: 3M™ TEGADERM™

## (undated) DEVICE — DEVICE SEAL L23CM NANO COAT MARYLAND JAW OPN DIV LIGASURE

## (undated) DEVICE — SURGICAL PROCEDURE PACK MAJ CDS

## (undated) DEVICE — GOWN,AURORA,NONREINF,RAGLAN,XXL,STERILE: Brand: MEDLINE

## (undated) DEVICE — COVER LT HNDL BLU PLAS

## (undated) DEVICE — 3M™ COBAN™ NL STERILE NON-LATEX SELF-ADHERENT WRAP, 2084S, 4 IN X 5 YD (10 CM X 4,5 M), 18 ROLLS/CASE: Brand: 3M™ COBAN™

## (undated) DEVICE — GAUZE,SPONGE,4"X4",8PLY,STRL,LF,10/TRAY: Brand: MEDLINE

## (undated) DEVICE — DRAPE 70X60IN SPLIT IMPERV ADHES STRIP

## (undated) DEVICE — IODOFORM PACKING STRIP: Brand: CURITY

## (undated) DEVICE — TROCAR ENDOSCP L100MM DIA11MM STBL SL BLDELSS DISP ENDOPATH

## (undated) DEVICE — COVER,MAYO STAND,XL,STERILE: Brand: MEDLINE

## (undated) DEVICE — SPONGE LAP W18XL18IN WHT COT 4 PLY FLD STRUNG RADPQ DISP ST

## (undated) DEVICE — CODMAN® BIPOLAR CORD DISPOSABLE: Brand: CODMAN®

## (undated) DEVICE — STAPLER SKIN H3.9MM WIRE DIA0.58MM CRWN 6.9MM 35 STPL ROT

## (undated) DEVICE — DRAPE,ABDOMINAL,MAJOR,STERILE: Brand: MEDLINE

## (undated) DEVICE — SPLINT KNEE L20IN FOR 32IN THGH UNIV FOAM 3 PC DSGN TRIMMED

## (undated) DEVICE — Device

## (undated) DEVICE — CONMED SCOPE SAVER BITE BLOCK, 20X27 MM: Brand: SCOPE SAVER

## (undated) DEVICE — 3M™ STERI-STRIP™ REINFORCED ADHESIVE SKIN CLOSURES, R1540, 1/8 IN X 3 IN (3 MM X 75 MM), 5 STRIPS/ENVELOPE: Brand: 3M™ STERI-STRIP™

## (undated) DEVICE — STOCKINETTE,IMPERVIOUS,12X48,STERILE: Brand: MEDLINE

## (undated) DEVICE — NEEDLE INSUF L120MM DIA2MM DISP FOR PNEUMOPERI ENDOPATH

## (undated) DEVICE — DRAPE C ARM UNIV W41XL74IN CLR PLAS XR VELC CLSR POLY STRP

## (undated) DEVICE — SUTURE VCRL SZ 3-0 L18IN ABSRB UD L26MM SH 1/2 CIR J864D

## (undated) DEVICE — GLOVE,SURG,SENSICARE SLT,LF,PF,7.5: Brand: MEDLINE

## (undated) DEVICE — WAX SURG 2.5GM HEMSTAT BNE BEESWAX PARAFFIN ISO PALMITATE

## (undated) DEVICE — DUAL CUT SAGITTAL BLADE

## (undated) DEVICE — 3M™ STERI-STRIP™ COMPOUND BENZOIN TINCTURE 40 BAGS/CARTON 4 CARTONS/CASE C1544: Brand: 3M™ STERI-STRIP™

## (undated) DEVICE — ELECTRODE PT RET AD L9FT HI MOIST COND ADH HYDRGEL CORDED

## (undated) DEVICE — TROCAR ENDOSCP L100MM DIA5MM BLDELSS STBL SL OBT RADLUC

## (undated) DEVICE — GLOVE ORANGE PI 7 1/2   MSG9075

## (undated) DEVICE — SUTURE SZ 0 27IN 5/8 CIR UR-6  TAPER PT VIOLET ABSRB VICRYL J603H

## (undated) DEVICE — SUTURE CHROMIC GUT SZ 3-0 L27IN ABSRB BRN L26MM SH 1/2 CIR G122H

## (undated) DEVICE — ELECTRODE ECG MONITR FOAM TEAR DROP ADLT RED

## (undated) DEVICE — TURNOVER KIT RM INF CTRL TECH

## (undated) DEVICE — 6 ML SYRINGE LUER-LOCK TIP: Brand: MONOJECT

## (undated) DEVICE — SUTURE PERMAHAND SZ 2-0 L30IN NONABSORBABLE BLK SILK W/O A305H

## (undated) DEVICE — SUTURE PERMAHAND SZ 2-0 L18IN NONABSORBABLE BLK L26MM SH C012D

## (undated) DEVICE — TUBING, SUCTION, 3/16" X 10', STRAIGHT: Brand: MEDLINE

## (undated) DEVICE — PACK ORTH LO EXT VI

## (undated) DEVICE — INTENDED FOR TISSUE SEPARATION, AND OTHER PROCEDURES THAT REQUIRE A SHARP SURGICAL BLADE TO PUNCTURE OR CUT.: Brand: BARD-PARKER ® STAINLESS STEEL BLADES

## (undated) DEVICE — SYRINGE MED 10ML LUERLOCK TIP W/O SFTY DISP

## (undated) DEVICE — GOWN SIRUS NONREIN LG W/TWL: Brand: MEDLINE INDUSTRIES, INC.

## (undated) DEVICE — MAJOR SET UP PK

## (undated) DEVICE — TUBE TRACH AD L95MM OD11MM ID6MM CUF PROX EXT LEN HI VOL LO

## (undated) DEVICE — HANDPIECE SUCTION TUBING INTERPULSE 10FT

## (undated) DEVICE — CIRCUIT ANES L72IN 3L BACT AND VIR FLTR EL CONN SGL LIMB

## (undated) DEVICE — 1.5 TO 1 DERMACARRIER: Brand: MESHGRAFTTM  II TISSUE EXPANSION SYSTEM

## (undated) DEVICE — TRAY PREP DRY W/ PREM GLV 2 APPL 6 SPNG 2 UNDPD 1 OVERWRAP

## (undated) DEVICE — VERSAJET II HYDROSURGERY SYSTEM HANDSET, 45DEG 8MM EXACT: Brand: VERSAJET

## (undated) DEVICE — BANDAGE ROLL,100% COTTON, 8 PLY, LARGE: Brand: KERLIX

## (undated) DEVICE — MICRO SAGITTAL BLADE (9.4 X 0.4 X 26.2MM)

## (undated) DEVICE — APPLIER CLP M L L11.4IN DIA10MM ENDOSCP ROT MULT FOR LIG

## (undated) DEVICE — SURGICAL PROCEDURE PACK EYE CLERMONT

## (undated) DEVICE — SOLUTION IV IRRIG WATER 1000ML POUR BRL 2F7114

## (undated) DEVICE — RETRIEVAL BALLOON CATHETER: Brand: EXTRACTOR™ PRO XL

## (undated) DEVICE — GLOVE ORANGE PI 8 1/2   MSG9085

## (undated) DEVICE — PODIATRY PK

## (undated) DEVICE — NO USE 18 MONTHS GOWN XL IMPERV REINF ST ECLIPSE DISP

## (undated) DEVICE — GUIDEWIRE WITH SPRING TIP - SINGLE USE: Brand: SAFEGUIDE®

## (undated) DEVICE — APPLICATOR PREP 26ML 0.7% IOD POVACRYLEX 74% ISO ALC ST

## (undated) DEVICE — 3 TO 1 DERMACARRIER: Brand: MESHGRAFTTM II TISSUE EXPANSION SYSTEM

## (undated) DEVICE — 3M™ TEGADERM™ TRANSPARENT FILM DRESSING FRAME STYLE, 1624W, 2-3/8 IN X 2-3/4 IN (6 CM X 7 CM), 100/CT 4CT/CASE: Brand: 3M™ TEGADERM™

## (undated) DEVICE — GLOVE SURG SZ 85 L12IN FNGR THK13MIL BRN LTX SYN POLYMER W

## (undated) DEVICE — BANDAGE GZ W45INXL4 1 10YD FLUF RL 6 PLY DERMACEA

## (undated) DEVICE — SWAB CULTURET AMIES DBL

## (undated) DEVICE — CANNULA NSL AD TBNG L7FT PVC STR NONFLARED PRNG O2 DEL W STD

## (undated) DEVICE — SURGICAL SET UP - SURE SET: Brand: MEDLINE INDUSTRIES, INC.

## (undated) DEVICE — SUTURE VCRL SZ 4-0 L18IN ABSRB UD L13MM PC-1 3/8 CIR PRIM J835G

## (undated) DEVICE — VELCLOSE LATEX FREE VELCRO ELASTIC BANDAGE 4INX5YD: Brand: VELCLOSE

## (undated) DEVICE — CANNULA NSL O2 AD 7 FT TBNG SFT TCH STD CONN N FLARED PRNG

## (undated) DEVICE — TROCAR ENDOSCP L100MM DIA5MM BLDELSS STBL SL THRD OPT VW

## (undated) DEVICE — CANNULA NSL 13FT TUBE AD ETCO2 DIV SAMP M

## (undated) DEVICE — BANDAGE ELASTIC 4 IN

## (undated) DEVICE — 1010 S-DRAPE TOWEL DRAPE 10/BX: Brand: STERI-DRAPE™

## (undated) DEVICE — COVER,TABLE,HEAVY DUTY,77"X90",STRL: Brand: MEDLINE

## (undated) DEVICE — PACK,UNIVERSAL,SPLIT,II,AURORA: Brand: MEDLINE

## (undated) DEVICE — GOWN,SIRUS,POLYRNF,SETINSLV,XL,20/CS: Brand: MEDLINE

## (undated) DEVICE — PUMP SUC IRR TBNG L10FT W/ HNDPC ASSEMB STRYKEFLOW 2

## (undated) DEVICE — DRESSING NEG PRSS M BLK POLYUR FOAM WND HYDROPHOBIC VAC

## (undated) DEVICE — SUTURE VCRL + SZ 0 L27IN ABSRB UD L36MM CT-1 1/2 CIR VCPP41D

## (undated) DEVICE — CANISTER NEG PRSS 500ML WND THER W/ TBNG NO PRSS RANG W/

## (undated) DEVICE — BANDAGE ELASTIC 6 IN

## (undated) DEVICE — GOWN SIRUS NONREIN XL W/TWL: Brand: MEDLINE INDUSTRIES, INC.

## (undated) DEVICE — COAXIAL HIGH FLOW TIP WITH SOFT SHIELD

## (undated) DEVICE — SPONGE,DISSECTOR,K,XRAY,9/16"X1/4",STRL: Brand: MEDLINE

## (undated) DEVICE — PAD,ABDOMINAL,8"X10",ST,LF: Brand: MEDLINE

## (undated) DEVICE — ABDOMINAL PAD: Brand: DERMACEA

## (undated) DEVICE — PACK,BASIC: Brand: MEDLINE

## (undated) DEVICE — Z CONVERTED USE 2273232 BANDAGE COMPR W6INXL11YD E KNIT DBL SELF CLSR EZE-BAND

## (undated) DEVICE — GOWN,SIRUS,FABRNF,XL,20/CS: Brand: MEDLINE